# Patient Record
Sex: MALE | Race: WHITE | NOT HISPANIC OR LATINO | Employment: UNEMPLOYED | URBAN - METROPOLITAN AREA
[De-identification: names, ages, dates, MRNs, and addresses within clinical notes are randomized per-mention and may not be internally consistent; named-entity substitution may affect disease eponyms.]

---

## 2021-05-28 ENCOUNTER — RECORDS - HEALTHEAST (OUTPATIENT)
Dept: ADMINISTRATIVE | Facility: CLINIC | Age: 26
End: 2021-05-28

## 2021-05-30 ENCOUNTER — RECORDS - HEALTHEAST (OUTPATIENT)
Dept: ADMINISTRATIVE | Facility: CLINIC | Age: 26
End: 2021-05-30

## 2021-06-03 ENCOUNTER — RECORDS - HEALTHEAST (OUTPATIENT)
Dept: ADMINISTRATIVE | Facility: CLINIC | Age: 26
End: 2021-06-03

## 2022-04-06 ENCOUNTER — APPOINTMENT (OUTPATIENT)
Dept: ULTRASOUND IMAGING | Facility: CLINIC | Age: 27
DRG: 871 | End: 2022-04-06
Attending: STUDENT IN AN ORGANIZED HEALTH CARE EDUCATION/TRAINING PROGRAM
Payer: COMMERCIAL

## 2022-04-06 ENCOUNTER — HOSPITAL ENCOUNTER (INPATIENT)
Facility: CLINIC | Age: 27
LOS: 6 days | Discharge: HOME | DRG: 871 | End: 2022-04-12
Attending: STUDENT IN AN ORGANIZED HEALTH CARE EDUCATION/TRAINING PROGRAM | Admitting: INTERNAL MEDICINE
Payer: COMMERCIAL

## 2022-04-06 DIAGNOSIS — F10.930 ALCOHOL WITHDRAWAL SYNDROME WITHOUT COMPLICATION (H): ICD-10-CM

## 2022-04-06 DIAGNOSIS — J18.9 PNEUMONIA DUE TO INFECTIOUS ORGANISM, UNSPECIFIED LATERALITY, UNSPECIFIED PART OF LUNG: ICD-10-CM

## 2022-04-06 DIAGNOSIS — K06.8 BLEEDING GUMS: ICD-10-CM

## 2022-04-06 DIAGNOSIS — E11.69 DIABETES MELLITUS TYPE 2 IN OBESE: Chronic | ICD-10-CM

## 2022-04-06 DIAGNOSIS — A41.9 SEPSIS, DUE TO UNSPECIFIED ORGANISM, UNSPECIFIED WHETHER ACUTE ORGAN DYSFUNCTION PRESENT (H): ICD-10-CM

## 2022-04-06 DIAGNOSIS — F41.9 ANXIETY: Primary | ICD-10-CM

## 2022-04-06 DIAGNOSIS — D64.9 ANEMIA, UNSPECIFIED TYPE: ICD-10-CM

## 2022-04-06 DIAGNOSIS — F10.921 ALCOHOL INTOXICATION WITH DELIRIUM (H): Chronic | ICD-10-CM

## 2022-04-06 DIAGNOSIS — D68.9 COAGULOPATHY (H): ICD-10-CM

## 2022-04-06 DIAGNOSIS — F10.930 ALCOHOL WITHDRAWAL, UNCOMPLICATED (H): Chronic | ICD-10-CM

## 2022-04-06 DIAGNOSIS — D69.6 THROMBOCYTOPENIA (H): ICD-10-CM

## 2022-04-06 DIAGNOSIS — E66.9 DIABETES MELLITUS TYPE 2 IN OBESE: Chronic | ICD-10-CM

## 2022-04-06 DIAGNOSIS — K70.31 ALCOHOLIC CIRRHOSIS OF LIVER WITH ASCITES (H): ICD-10-CM

## 2022-04-06 LAB
ALBUMIN SERPL-MCNC: 3.7 G/DL (ref 3.5–5)
ALP SERPL-CCNC: 158 U/L (ref 45–120)
ALT SERPL W P-5'-P-CCNC: 131 U/L (ref 0–45)
ANION GAP SERPL CALCULATED.3IONS-SCNC: 21 MMOL/L (ref 5–18)
APTT PPP: 49 SECONDS (ref 22–38)
AST SERPL W P-5'-P-CCNC: 237 U/L (ref 0–40)
BILIRUB DIRECT SERPL-MCNC: 4.1 MG/DL
BILIRUB SERPL-MCNC: 11.7 MG/DL (ref 0–1)
BUN SERPL-MCNC: 5 MG/DL (ref 8–22)
CALCIUM SERPL-MCNC: 9.8 MG/DL (ref 8.5–10.5)
CHLORIDE BLD-SCNC: 86 MMOL/L (ref 98–107)
CO2 SERPL-SCNC: 23 MMOL/L (ref 22–31)
CREAT SERPL-MCNC: 0.67 MG/DL (ref 0.7–1.3)
ETHANOL SERPL-MCNC: 225 MG/DL
GFR SERPL CREATININE-BSD FRML MDRD: >90 ML/MIN/1.73M2
GLUCOSE BLD-MCNC: 374 MG/DL (ref 70–125)
INR PPP: 1.72 (ref 0.85–1.15)
LACTATE SERPL-SCNC: 4.3 MMOL/L (ref 0.7–2)
LIPASE SERPL-CCNC: 45 U/L (ref 0–52)
MAGNESIUM SERPL-MCNC: 1.8 MG/DL (ref 1.8–2.6)
POTASSIUM BLD-SCNC: 4 MMOL/L (ref 3.5–5)
PROT SERPL-MCNC: 10.3 G/DL (ref 6–8)
SARS-COV-2 RNA RESP QL NAA+PROBE: NEGATIVE
SODIUM SERPL-SCNC: 130 MMOL/L (ref 136–145)

## 2022-04-06 PROCEDURE — 96367 TX/PROPH/DG ADDL SEQ IV INF: CPT

## 2022-04-06 PROCEDURE — 250N000011 HC RX IP 250 OP 636: Performed by: STUDENT IN AN ORGANIZED HEALTH CARE EDUCATION/TRAINING PROGRAM

## 2022-04-06 PROCEDURE — 82248 BILIRUBIN DIRECT: CPT | Performed by: STUDENT IN AN ORGANIZED HEALTH CARE EDUCATION/TRAINING PROGRAM

## 2022-04-06 PROCEDURE — 87040 BLOOD CULTURE FOR BACTERIA: CPT | Performed by: STUDENT IN AN ORGANIZED HEALTH CARE EDUCATION/TRAINING PROGRAM

## 2022-04-06 PROCEDURE — 76705 ECHO EXAM OF ABDOMEN: CPT

## 2022-04-06 PROCEDURE — 83880 ASSAY OF NATRIURETIC PEPTIDE: CPT | Performed by: INTERNAL MEDICINE

## 2022-04-06 PROCEDURE — 96365 THER/PROPH/DIAG IV INF INIT: CPT

## 2022-04-06 PROCEDURE — 258N000003 HC RX IP 258 OP 636: Performed by: STUDENT IN AN ORGANIZED HEALTH CARE EDUCATION/TRAINING PROGRAM

## 2022-04-06 PROCEDURE — 99285 EMERGENCY DEPT VISIT HI MDM: CPT | Mod: 25

## 2022-04-06 PROCEDURE — 82077 ASSAY SPEC XCP UR&BREATH IA: CPT | Performed by: STUDENT IN AN ORGANIZED HEALTH CARE EDUCATION/TRAINING PROGRAM

## 2022-04-06 PROCEDURE — 96376 TX/PRO/DX INJ SAME DRUG ADON: CPT

## 2022-04-06 PROCEDURE — C9803 HOPD COVID-19 SPEC COLLECT: HCPCS

## 2022-04-06 PROCEDURE — 36415 COLL VENOUS BLD VENIPUNCTURE: CPT | Performed by: STUDENT IN AN ORGANIZED HEALTH CARE EDUCATION/TRAINING PROGRAM

## 2022-04-06 PROCEDURE — HZ2ZZZZ DETOXIFICATION SERVICES FOR SUBSTANCE ABUSE TREATMENT: ICD-10-PCS | Performed by: INTERNAL MEDICINE

## 2022-04-06 PROCEDURE — 85610 PROTHROMBIN TIME: CPT | Performed by: STUDENT IN AN ORGANIZED HEALTH CARE EDUCATION/TRAINING PROGRAM

## 2022-04-06 PROCEDURE — 85730 THROMBOPLASTIN TIME PARTIAL: CPT | Performed by: STUDENT IN AN ORGANIZED HEALTH CARE EDUCATION/TRAINING PROGRAM

## 2022-04-06 PROCEDURE — 83605 ASSAY OF LACTIC ACID: CPT | Performed by: STUDENT IN AN ORGANIZED HEALTH CARE EDUCATION/TRAINING PROGRAM

## 2022-04-06 PROCEDURE — 96375 TX/PRO/DX INJ NEW DRUG ADDON: CPT

## 2022-04-06 PROCEDURE — 99223 1ST HOSP IP/OBS HIGH 75: CPT | Mod: AI | Performed by: INTERNAL MEDICINE

## 2022-04-06 PROCEDURE — 87635 SARS-COV-2 COVID-19 AMP PRB: CPT | Performed by: STUDENT IN AN ORGANIZED HEALTH CARE EDUCATION/TRAINING PROGRAM

## 2022-04-06 PROCEDURE — 83690 ASSAY OF LIPASE: CPT | Performed by: STUDENT IN AN ORGANIZED HEALTH CARE EDUCATION/TRAINING PROGRAM

## 2022-04-06 PROCEDURE — 96361 HYDRATE IV INFUSION ADD-ON: CPT

## 2022-04-06 PROCEDURE — 85027 COMPLETE CBC AUTOMATED: CPT | Performed by: STUDENT IN AN ORGANIZED HEALTH CARE EDUCATION/TRAINING PROGRAM

## 2022-04-06 PROCEDURE — 210N000002 HC R&B HEART CARE

## 2022-04-06 PROCEDURE — 83735 ASSAY OF MAGNESIUM: CPT | Performed by: STUDENT IN AN ORGANIZED HEALTH CARE EDUCATION/TRAINING PROGRAM

## 2022-04-06 PROCEDURE — 84100 ASSAY OF PHOSPHORUS: CPT | Performed by: INTERNAL MEDICINE

## 2022-04-06 PROCEDURE — 80053 COMPREHEN METABOLIC PANEL: CPT | Performed by: STUDENT IN AN ORGANIZED HEALTH CARE EDUCATION/TRAINING PROGRAM

## 2022-04-06 RX ORDER — DIAZEPAM 10 MG/2ML
5 INJECTION, SOLUTION INTRAMUSCULAR; INTRAVENOUS ONCE
Status: COMPLETED | OUTPATIENT
Start: 2022-04-06 | End: 2022-04-06

## 2022-04-06 RX ORDER — CEFEPIME HYDROCHLORIDE 1 G/1
1 INJECTION, POWDER, FOR SOLUTION INTRAMUSCULAR; INTRAVENOUS ONCE
Status: COMPLETED | OUTPATIENT
Start: 2022-04-06 | End: 2022-04-07

## 2022-04-06 RX ORDER — DIAZEPAM 10 MG/2ML
5 INJECTION, SOLUTION INTRAMUSCULAR; INTRAVENOUS ONCE
Status: COMPLETED | OUTPATIENT
Start: 2022-04-06 | End: 2022-04-07

## 2022-04-06 RX ADMIN — PHYTONADIONE 2 MG: 10 INJECTION, EMULSION INTRAMUSCULAR; INTRAVENOUS; SUBCUTANEOUS at 23:16

## 2022-04-06 RX ADMIN — DIAZEPAM 5 MG: 5 INJECTION, SOLUTION INTRAMUSCULAR; INTRAVENOUS at 23:13

## 2022-04-06 RX ADMIN — SODIUM CHLORIDE 1000 ML: 9 INJECTION, SOLUTION INTRAVENOUS at 23:38

## 2022-04-07 ENCOUNTER — APPOINTMENT (OUTPATIENT)
Dept: CARDIOLOGY | Facility: CLINIC | Age: 27
DRG: 871 | End: 2022-04-07
Attending: INTERNAL MEDICINE
Payer: COMMERCIAL

## 2022-04-07 ENCOUNTER — APPOINTMENT (OUTPATIENT)
Dept: CT IMAGING | Facility: CLINIC | Age: 27
DRG: 871 | End: 2022-04-07
Attending: STUDENT IN AN ORGANIZED HEALTH CARE EDUCATION/TRAINING PROGRAM
Payer: COMMERCIAL

## 2022-04-07 ENCOUNTER — APPOINTMENT (OUTPATIENT)
Dept: ULTRASOUND IMAGING | Facility: CLINIC | Age: 27
DRG: 871 | End: 2022-04-07
Attending: INTERNAL MEDICINE
Payer: COMMERCIAL

## 2022-04-07 ENCOUNTER — APPOINTMENT (OUTPATIENT)
Dept: RADIOLOGY | Facility: CLINIC | Age: 27
DRG: 871 | End: 2022-04-07
Attending: INTERNAL MEDICINE
Payer: COMMERCIAL

## 2022-04-07 LAB
ABO/RH(D): NORMAL
ALBUMIN SERPL-MCNC: 3.1 G/DL (ref 3.5–5)
ALP SERPL-CCNC: 128 U/L (ref 45–120)
ALT SERPL W P-5'-P-CCNC: 110 U/L (ref 0–45)
ANION GAP SERPL CALCULATED.3IONS-SCNC: 14 MMOL/L (ref 5–18)
ANTIBODY SCREEN: NEGATIVE
AST SERPL W P-5'-P-CCNC: 199 U/L (ref 0–40)
BASE EXCESS BLDV CALC-SCNC: 3.8 MMOL/L
BASOPHILS # BLD MANUAL: 0.1 10E3/UL (ref 0–0.2)
BASOPHILS # BLD MANUAL: 0.3 10E3/UL (ref 0–0.2)
BASOPHILS NFR BLD MANUAL: 1 %
BASOPHILS NFR BLD MANUAL: 3 %
BILIRUB SERPL-MCNC: 10.3 MG/DL (ref 0–1)
BNP SERPL-MCNC: 138 PG/ML (ref 0–35)
BNP SERPL-MCNC: 205 PG/ML (ref 0–35)
BUN SERPL-MCNC: 6 MG/DL (ref 8–22)
CALCIUM SERPL-MCNC: 8.9 MG/DL (ref 8.5–10.5)
CHLORIDE BLD-SCNC: 93 MMOL/L (ref 98–107)
CO2 SERPL-SCNC: 25 MMOL/L (ref 22–31)
CREAT SERPL-MCNC: 0.62 MG/DL (ref 0.7–1.3)
EOSINOPHIL # BLD MANUAL: 0 10E3/UL (ref 0–0.7)
EOSINOPHIL # BLD MANUAL: 0.2 10E3/UL (ref 0–0.7)
EOSINOPHIL NFR BLD MANUAL: 0 %
EOSINOPHIL NFR BLD MANUAL: 2 %
ERYTHROCYTE [DISTWIDTH] IN BLOOD BY AUTOMATED COUNT: 16.2 % (ref 10–15)
ERYTHROCYTE [DISTWIDTH] IN BLOOD BY AUTOMATED COUNT: 16.4 % (ref 10–15)
FERRITIN SERPL-MCNC: 423 NG/ML (ref 27–300)
FOLATE SERPL-MCNC: >20 NG/ML
GFR SERPL CREATININE-BSD FRML MDRD: >90 ML/MIN/1.73M2
GLUCOSE BLD-MCNC: 279 MG/DL (ref 70–125)
GLUCOSE BLDC GLUCOMTR-MCNC: 253 MG/DL (ref 70–99)
GLUCOSE BLDC GLUCOMTR-MCNC: 269 MG/DL (ref 70–99)
GLUCOSE BLDC GLUCOMTR-MCNC: 297 MG/DL (ref 70–99)
GLUCOSE BLDC GLUCOMTR-MCNC: 311 MG/DL (ref 70–99)
GLUCOSE BLDC GLUCOMTR-MCNC: 346 MG/DL (ref 70–99)
HAV IGM SERPL QL IA: NEGATIVE
HBA1C MFR BLD: 7.4 %
HBV CORE IGM SERPL QL IA: NEGATIVE
HBV SURFACE AG SERPL QL IA: NONREACTIVE
HCO3 BLDV-SCNC: 27 MMOL/L (ref 24–30)
HCT VFR BLD AUTO: 21.9 % (ref 40–53)
HCT VFR BLD AUTO: 27 % (ref 40–53)
HCV AB SERPL QL IA: NEGATIVE
HGB BLD-MCNC: 7.1 G/DL (ref 13.3–17.7)
HGB BLD-MCNC: 7.4 G/DL (ref 13.3–17.7)
HGB BLD-MCNC: 7.6 G/DL (ref 13.3–17.7)
HGB BLD-MCNC: 8.6 G/DL (ref 13.3–17.7)
INR PPP: 1.83 (ref 0.85–1.15)
INR PPP: 1.86 (ref 0.85–1.15)
IRON SATN MFR SERPL: 46 % (ref 20–50)
IRON SERPL-MCNC: 85 UG/DL (ref 42–175)
LACTATE SERPL-SCNC: 2.1 MMOL/L (ref 0.7–2)
LACTATE SERPL-SCNC: 3.3 MMOL/L (ref 0.7–2)
LVEF ECHO: NORMAL
LYMPHOCYTES # BLD MANUAL: 1.2 10E3/UL (ref 0.8–5.3)
LYMPHOCYTES # BLD MANUAL: 1.3 10E3/UL (ref 0.8–5.3)
LYMPHOCYTES NFR BLD MANUAL: 12 %
LYMPHOCYTES NFR BLD MANUAL: 16 %
MAGNESIUM SERPL-MCNC: 1.7 MG/DL (ref 1.8–2.6)
MCH RBC QN AUTO: 33.7 PG (ref 26.5–33)
MCH RBC QN AUTO: 33.8 PG (ref 26.5–33)
MCHC RBC AUTO-ENTMCNC: 31.9 G/DL (ref 31.5–36.5)
MCHC RBC AUTO-ENTMCNC: 32.4 G/DL (ref 31.5–36.5)
MCV RBC AUTO: 104 FL (ref 78–100)
MCV RBC AUTO: 106 FL (ref 78–100)
MONOCYTES # BLD MANUAL: 0.3 10E3/UL (ref 0–1.3)
MONOCYTES # BLD MANUAL: 0.4 10E3/UL (ref 0–1.3)
MONOCYTES NFR BLD MANUAL: 4 %
MONOCYTES NFR BLD MANUAL: 4 %
NEUTROPHILS # BLD MANUAL: 6.3 10E3/UL (ref 1.6–8.3)
NEUTROPHILS # BLD MANUAL: 7.9 10E3/UL (ref 1.6–8.3)
NEUTROPHILS NFR BLD MANUAL: 77 %
NEUTROPHILS NFR BLD MANUAL: 81 %
OXYHGB MFR BLDV: 89.4 % (ref 70–75)
PCO2 BLDV: 46 MM HG (ref 35–50)
PH BLDV: 7.4 [PH] (ref 7.35–7.45)
PHOSPHATE SERPL-MCNC: 3.7 MG/DL (ref 2.5–4.5)
PHOSPHATE SERPL-MCNC: 3.9 MG/DL (ref 2.5–4.5)
PLAT MORPH BLD: ABNORMAL
PLAT MORPH BLD: NORMAL
PLATELET # BLD AUTO: 111 10E3/UL (ref 150–450)
PLATELET # BLD AUTO: 97 10E3/UL (ref 150–450)
PO2 BLDV: 69 MM HG (ref 25–47)
POTASSIUM BLD-SCNC: 4.1 MMOL/L (ref 3.5–5)
PROCALCITONIN SERPL-MCNC: 0.41 NG/ML (ref 0–0.49)
PROT SERPL-MCNC: 8.5 G/DL (ref 6–8)
RBC # BLD AUTO: 2.1 10E6/UL (ref 4.4–5.9)
RBC # BLD AUTO: 2.55 10E6/UL (ref 4.4–5.9)
RBC MORPH BLD: ABNORMAL
RBC MORPH BLD: NORMAL
SAO2 % BLDV: 93.2 % (ref 70–75)
SODIUM SERPL-SCNC: 132 MMOL/L (ref 136–145)
SPECIMEN EXPIRATION DATE: NORMAL
TIBC SERPL-MCNC: 185 UG/DL (ref 313–563)
TRANSFERRIN SERPL-MCNC: 148 MG/DL (ref 212–360)
TROPONIN I SERPL-MCNC: 0.02 NG/ML (ref 0–0.29)
VIT B12 SERPL-MCNC: >2000 PG/ML (ref 213–816)
WBC # BLD AUTO: 8.2 10E3/UL (ref 4–11)
WBC # BLD AUTO: 9.7 10E3/UL (ref 4–11)

## 2022-04-07 PROCEDURE — 84100 ASSAY OF PHOSPHORUS: CPT | Performed by: INTERNAL MEDICINE

## 2022-04-07 PROCEDURE — 83605 ASSAY OF LACTIC ACID: CPT | Performed by: INTERNAL MEDICINE

## 2022-04-07 PROCEDURE — 210N000002 HC R&B HEART CARE

## 2022-04-07 PROCEDURE — 80074 ACUTE HEPATITIS PANEL: CPT | Performed by: INTERNAL MEDICINE

## 2022-04-07 PROCEDURE — 99233 SBSQ HOSP IP/OBS HIGH 50: CPT | Performed by: INTERNAL MEDICINE

## 2022-04-07 PROCEDURE — 93306 TTE W/DOPPLER COMPLETE: CPT

## 2022-04-07 PROCEDURE — 84145 PROCALCITONIN (PCT): CPT | Performed by: INTERNAL MEDICINE

## 2022-04-07 PROCEDURE — 99223 1ST HOSP IP/OBS HIGH 75: CPT | Performed by: INTERNAL MEDICINE

## 2022-04-07 PROCEDURE — 83880 ASSAY OF NATRIURETIC PEPTIDE: CPT | Performed by: INTERNAL MEDICINE

## 2022-04-07 PROCEDURE — 36415 COLL VENOUS BLD VENIPUNCTURE: CPT | Performed by: INTERNAL MEDICINE

## 2022-04-07 PROCEDURE — 84484 ASSAY OF TROPONIN QUANT: CPT | Performed by: INTERNAL MEDICINE

## 2022-04-07 PROCEDURE — 250N000011 HC RX IP 250 OP 636: Performed by: INTERNAL MEDICINE

## 2022-04-07 PROCEDURE — 82728 ASSAY OF FERRITIN: CPT | Performed by: INTERNAL MEDICINE

## 2022-04-07 PROCEDURE — 99223 1ST HOSP IP/OBS HIGH 75: CPT | Mod: GT | Performed by: INTERNAL MEDICINE

## 2022-04-07 PROCEDURE — 71045 X-RAY EXAM CHEST 1 VIEW: CPT

## 2022-04-07 PROCEDURE — 93306 TTE W/DOPPLER COMPLETE: CPT | Mod: 26 | Performed by: INTERNAL MEDICINE

## 2022-04-07 PROCEDURE — 85018 HEMOGLOBIN: CPT | Performed by: INTERNAL MEDICINE

## 2022-04-07 PROCEDURE — 250N000011 HC RX IP 250 OP 636: Performed by: STUDENT IN AN ORGANIZED HEALTH CARE EDUCATION/TRAINING PROGRAM

## 2022-04-07 PROCEDURE — 36415 COLL VENOUS BLD VENIPUNCTURE: CPT | Performed by: STUDENT IN AN ORGANIZED HEALTH CARE EDUCATION/TRAINING PROGRAM

## 2022-04-07 PROCEDURE — 250N000013 HC RX MED GY IP 250 OP 250 PS 637: Performed by: INTERNAL MEDICINE

## 2022-04-07 PROCEDURE — 83735 ASSAY OF MAGNESIUM: CPT | Performed by: INTERNAL MEDICINE

## 2022-04-07 PROCEDURE — 74177 CT ABD & PELVIS W/CONTRAST: CPT

## 2022-04-07 PROCEDURE — C9113 INJ PANTOPRAZOLE SODIUM, VIA: HCPCS | Performed by: INTERNAL MEDICINE

## 2022-04-07 PROCEDURE — 82607 VITAMIN B-12: CPT | Performed by: INTERNAL MEDICINE

## 2022-04-07 PROCEDURE — 250N000013 HC RX MED GY IP 250 OP 250 PS 637: Performed by: PHYSICIAN ASSISTANT

## 2022-04-07 PROCEDURE — 250N000009 HC RX 250: Performed by: INTERNAL MEDICINE

## 2022-04-07 PROCEDURE — 85610 PROTHROMBIN TIME: CPT | Performed by: STUDENT IN AN ORGANIZED HEALTH CARE EDUCATION/TRAINING PROGRAM

## 2022-04-07 PROCEDURE — 86850 RBC ANTIBODY SCREEN: CPT | Performed by: STUDENT IN AN ORGANIZED HEALTH CARE EDUCATION/TRAINING PROGRAM

## 2022-04-07 PROCEDURE — 83605 ASSAY OF LACTIC ACID: CPT | Performed by: STUDENT IN AN ORGANIZED HEALTH CARE EDUCATION/TRAINING PROGRAM

## 2022-04-07 PROCEDURE — 258N000003 HC RX IP 258 OP 636: Performed by: STUDENT IN AN ORGANIZED HEALTH CARE EDUCATION/TRAINING PROGRAM

## 2022-04-07 PROCEDURE — 250N000012 HC RX MED GY IP 250 OP 636 PS 637: Performed by: INTERNAL MEDICINE

## 2022-04-07 PROCEDURE — 83036 HEMOGLOBIN GLYCOSYLATED A1C: CPT | Performed by: INTERNAL MEDICINE

## 2022-04-07 PROCEDURE — 258N000003 HC RX IP 258 OP 636: Performed by: INTERNAL MEDICINE

## 2022-04-07 PROCEDURE — 83540 ASSAY OF IRON: CPT | Performed by: INTERNAL MEDICINE

## 2022-04-07 PROCEDURE — 82805 BLOOD GASES W/O2 SATURATION: CPT | Performed by: INTERNAL MEDICINE

## 2022-04-07 PROCEDURE — 93970 EXTREMITY STUDY: CPT

## 2022-04-07 PROCEDURE — 80053 COMPREHEN METABOLIC PANEL: CPT | Performed by: INTERNAL MEDICINE

## 2022-04-07 PROCEDURE — 272N000004 HC RX 272: Performed by: EMERGENCY MEDICINE

## 2022-04-07 PROCEDURE — 82746 ASSAY OF FOLIC ACID SERUM: CPT | Performed by: INTERNAL MEDICINE

## 2022-04-07 PROCEDURE — 85027 COMPLETE CBC AUTOMATED: CPT | Performed by: INTERNAL MEDICINE

## 2022-04-07 RX ORDER — LORAZEPAM 2 MG/ML
1-2 INJECTION INTRAMUSCULAR EVERY 30 MIN PRN
Status: DISCONTINUED | OUTPATIENT
Start: 2022-04-07 | End: 2022-04-08

## 2022-04-07 RX ORDER — GABAPENTIN 100 MG/1
100 CAPSULE ORAL EVERY 8 HOURS
Status: DISCONTINUED | OUTPATIENT
Start: 2022-04-09 | End: 2022-04-08

## 2022-04-07 RX ORDER — MEROPENEM 1 G/1
1 INJECTION, POWDER, FOR SOLUTION INTRAVENOUS EVERY 8 HOURS
Status: DISCONTINUED | OUTPATIENT
Start: 2022-04-07 | End: 2022-04-07

## 2022-04-07 RX ORDER — HYDROXYZINE HYDROCHLORIDE 25 MG/1
25 TABLET, FILM COATED ORAL EVERY 8 HOURS PRN
Status: ON HOLD | COMMUNITY
End: 2022-06-20

## 2022-04-07 RX ORDER — GABAPENTIN 100 MG/1
100 CAPSULE ORAL ONCE
Status: COMPLETED | OUTPATIENT
Start: 2022-04-07 | End: 2022-04-07

## 2022-04-07 RX ORDER — LORAZEPAM 2 MG/ML
1 INJECTION INTRAMUSCULAR EVERY 6 HOURS PRN
Status: COMPLETED | OUTPATIENT
Start: 2022-04-07 | End: 2022-04-07

## 2022-04-07 RX ORDER — FLUMAZENIL 0.1 MG/ML
0.2 INJECTION, SOLUTION INTRAVENOUS
Status: DISCONTINUED | OUTPATIENT
Start: 2022-04-07 | End: 2022-04-12 | Stop reason: HOSPADM

## 2022-04-07 RX ORDER — HALOPERIDOL 5 MG/ML
2.5-5 INJECTION INTRAMUSCULAR EVERY 6 HOURS PRN
Status: DISCONTINUED | OUTPATIENT
Start: 2022-04-07 | End: 2022-04-12 | Stop reason: HOSPADM

## 2022-04-07 RX ORDER — HYDROMORPHONE HCL IN WATER/PF 6 MG/30 ML
0.2 PATIENT CONTROLLED ANALGESIA SYRINGE INTRAVENOUS EVERY 4 HOURS PRN
Status: DISCONTINUED | OUTPATIENT
Start: 2022-04-07 | End: 2022-04-08

## 2022-04-07 RX ORDER — ALBUTEROL SULFATE 0.83 MG/ML
2.5 SOLUTION RESPIRATORY (INHALATION) EVERY 6 HOURS PRN
Status: DISCONTINUED | OUTPATIENT
Start: 2022-04-07 | End: 2022-04-12 | Stop reason: HOSPADM

## 2022-04-07 RX ORDER — LORAZEPAM 1 MG/1
1-2 TABLET ORAL EVERY 30 MIN PRN
Status: DISCONTINUED | OUTPATIENT
Start: 2022-04-07 | End: 2022-04-08

## 2022-04-07 RX ORDER — OLANZAPINE 5 MG/1
5-10 TABLET, ORALLY DISINTEGRATING ORAL EVERY 6 HOURS PRN
Status: DISCONTINUED | OUTPATIENT
Start: 2022-04-07 | End: 2022-04-12 | Stop reason: HOSPADM

## 2022-04-07 RX ORDER — LACTULOSE 10 G/15ML
10 SOLUTION ORAL 2 TIMES DAILY
Status: DISCONTINUED | OUTPATIENT
Start: 2022-04-07 | End: 2022-04-08

## 2022-04-07 RX ORDER — CEFTAZIDIME 2 G/1
2 INJECTION, POWDER, FOR SOLUTION INTRAVENOUS EVERY 8 HOURS
Status: DISCONTINUED | OUTPATIENT
Start: 2022-04-07 | End: 2022-04-11

## 2022-04-07 RX ORDER — HYDROXYZINE HYDROCHLORIDE 25 MG/1
25 TABLET, FILM COATED ORAL EVERY 8 HOURS PRN
Status: DISCONTINUED | OUTPATIENT
Start: 2022-04-07 | End: 2022-04-12 | Stop reason: HOSPADM

## 2022-04-07 RX ORDER — NICOTINE POLACRILEX 4 MG
15-30 LOZENGE BUCCAL
Status: DISCONTINUED | OUTPATIENT
Start: 2022-04-07 | End: 2022-04-08

## 2022-04-07 RX ORDER — DEXTROSE MONOHYDRATE 25 G/50ML
25-50 INJECTION, SOLUTION INTRAVENOUS
Status: DISCONTINUED | OUTPATIENT
Start: 2022-04-07 | End: 2022-04-12 | Stop reason: HOSPADM

## 2022-04-07 RX ORDER — FUROSEMIDE 10 MG/ML
20 INJECTION INTRAMUSCULAR; INTRAVENOUS EVERY 8 HOURS
Status: DISCONTINUED | OUTPATIENT
Start: 2022-04-07 | End: 2022-04-10

## 2022-04-07 RX ORDER — LIDOCAINE 40 MG/G
CREAM TOPICAL
Status: DISCONTINUED | OUTPATIENT
Start: 2022-04-07 | End: 2022-04-12 | Stop reason: HOSPADM

## 2022-04-07 RX ORDER — DEXTROSE MONOHYDRATE 25 G/50ML
25-50 INJECTION, SOLUTION INTRAVENOUS
Status: DISCONTINUED | OUTPATIENT
Start: 2022-04-07 | End: 2022-04-08

## 2022-04-07 RX ORDER — FOLIC ACID 1 MG/1
1 TABLET ORAL DAILY
Status: DISCONTINUED | OUTPATIENT
Start: 2022-04-07 | End: 2022-04-12 | Stop reason: HOSPADM

## 2022-04-07 RX ORDER — IOPAMIDOL 755 MG/ML
100 INJECTION, SOLUTION INTRAVASCULAR ONCE
Status: COMPLETED | OUTPATIENT
Start: 2022-04-07 | End: 2022-04-07

## 2022-04-07 RX ORDER — FUROSEMIDE 10 MG/ML
10 INJECTION INTRAMUSCULAR; INTRAVENOUS ONCE
Status: COMPLETED | OUTPATIENT
Start: 2022-04-07 | End: 2022-04-07

## 2022-04-07 RX ORDER — MULTIVITAMIN,THERAPEUTIC
1 TABLET ORAL DAILY
Status: DISCONTINUED | OUTPATIENT
Start: 2022-04-07 | End: 2022-04-12 | Stop reason: HOSPADM

## 2022-04-07 RX ORDER — LORAZEPAM 2 MG/ML
1 INJECTION INTRAMUSCULAR ONCE
Status: COMPLETED | OUTPATIENT
Start: 2022-04-07 | End: 2022-04-07

## 2022-04-07 RX ORDER — GABAPENTIN 300 MG/1
300 CAPSULE ORAL EVERY 8 HOURS
Status: DISCONTINUED | OUTPATIENT
Start: 2022-04-07 | End: 2022-04-08

## 2022-04-07 RX ORDER — CEFEPIME HYDROCHLORIDE 1 G/1
1 INJECTION, POWDER, FOR SOLUTION INTRAMUSCULAR; INTRAVENOUS EVERY 8 HOURS
Status: DISCONTINUED | OUTPATIENT
Start: 2022-04-07 | End: 2022-04-07

## 2022-04-07 RX ORDER — GABAPENTIN 300 MG/1
300 CAPSULE ORAL EVERY 8 HOURS
Status: DISCONTINUED | OUTPATIENT
Start: 2022-04-07 | End: 2022-04-07

## 2022-04-07 RX ORDER — NICOTINE POLACRILEX 4 MG
15-30 LOZENGE BUCCAL
Status: DISCONTINUED | OUTPATIENT
Start: 2022-04-07 | End: 2022-04-12 | Stop reason: HOSPADM

## 2022-04-07 RX ORDER — FLUOXETINE 10 MG/1
10 CAPSULE ORAL AT BEDTIME
Status: ON HOLD | COMMUNITY
End: 2022-04-11

## 2022-04-07 RX ORDER — FLUOXETINE 10 MG/1
10 CAPSULE ORAL AT BEDTIME
Status: DISCONTINUED | OUTPATIENT
Start: 2022-04-07 | End: 2022-04-11

## 2022-04-07 RX ORDER — CLONIDINE HYDROCHLORIDE 0.1 MG/1
0.1 TABLET ORAL EVERY 8 HOURS
Status: DISCONTINUED | OUTPATIENT
Start: 2022-04-07 | End: 2022-04-08

## 2022-04-07 RX ORDER — CLONIDINE HYDROCHLORIDE 0.1 MG/1
0.1 TABLET ORAL EVERY 8 HOURS PRN
Status: DISCONTINUED | OUTPATIENT
Start: 2022-04-07 | End: 2022-04-07

## 2022-04-07 RX ADMIN — Medication 5 MG: at 12:24

## 2022-04-07 RX ADMIN — LORAZEPAM 1 MG: 2 INJECTION INTRAMUSCULAR; INTRAVENOUS at 04:55

## 2022-04-07 RX ADMIN — CEFTAZIDIME 2 G: 2 INJECTION, POWDER, FOR SOLUTION INTRAVENOUS at 15:34

## 2022-04-07 RX ADMIN — LORAZEPAM 2 MG: 1 TABLET ORAL at 22:41

## 2022-04-07 RX ADMIN — FOLIC ACID 1 MG: 1 TABLET ORAL at 08:25

## 2022-04-07 RX ADMIN — Medication 100 MG: at 01:57

## 2022-04-07 RX ADMIN — GABAPENTIN 300 MG: 300 CAPSULE ORAL at 06:50

## 2022-04-07 RX ADMIN — SODIUM CHLORIDE 1000 ML: 9 INJECTION, SOLUTION INTRAVENOUS at 00:25

## 2022-04-07 RX ADMIN — LORAZEPAM 1 MG: 2 INJECTION INTRAMUSCULAR; INTRAVENOUS at 01:58

## 2022-04-07 RX ADMIN — LORAZEPAM 1 MG: 1 TABLET ORAL at 13:52

## 2022-04-07 RX ADMIN — GABAPENTIN 300 MG: 300 CAPSULE ORAL at 22:41

## 2022-04-07 RX ADMIN — FOLIC ACID 1 MG: 1 TABLET ORAL at 01:57

## 2022-04-07 RX ADMIN — LACTULOSE 10 G: 10 SOLUTION ORAL at 19:13

## 2022-04-07 RX ADMIN — FLUOXETINE 10 MG: 10 CAPSULE ORAL at 22:41

## 2022-04-07 RX ADMIN — IOPAMIDOL 80 ML: 755 INJECTION, SOLUTION INTRAVENOUS at 00:23

## 2022-04-07 RX ADMIN — INSULIN GLARGINE 10 UNITS: 100 INJECTION, SOLUTION SUBCUTANEOUS at 09:20

## 2022-04-07 RX ADMIN — LORAZEPAM 2 MG: 1 TABLET ORAL at 19:12

## 2022-04-07 RX ADMIN — FUROSEMIDE 20 MG: 10 INJECTION, SOLUTION INTRAVENOUS at 15:06

## 2022-04-07 RX ADMIN — FUROSEMIDE 10 MG: 10 INJECTION, SOLUTION INTRAVENOUS at 06:47

## 2022-04-07 RX ADMIN — GABAPENTIN 300 MG: 300 CAPSULE ORAL at 15:06

## 2022-04-07 RX ADMIN — LACTULOSE 10 G: 10 SOLUTION ORAL at 11:02

## 2022-04-07 RX ADMIN — THERA TABS 1 TABLET: TAB at 08:25

## 2022-04-07 RX ADMIN — CLONIDINE HYDROCHLORIDE 0.1 MG: 0.1 TABLET ORAL at 06:50

## 2022-04-07 RX ADMIN — CLONIDINE HYDROCHLORIDE 0.1 MG: 0.1 TABLET ORAL at 15:32

## 2022-04-07 RX ADMIN — CEFEPIME HYDROCHLORIDE 1 G: 1 INJECTION, POWDER, FOR SOLUTION INTRAMUSCULAR; INTRAVENOUS at 11:01

## 2022-04-07 RX ADMIN — CLONIDINE HYDROCHLORIDE 0.1 MG: 0.1 TABLET ORAL at 22:42

## 2022-04-07 RX ADMIN — THERA TABS 1 TABLET: TAB at 01:58

## 2022-04-07 RX ADMIN — Medication: at 01:46

## 2022-04-07 RX ADMIN — ALBUTEROL SULFATE 2.5 MG: 2.5 SOLUTION RESPIRATORY (INHALATION) at 04:56

## 2022-04-07 RX ADMIN — PANTOPRAZOLE SODIUM 40 MG: 40 INJECTION, POWDER, FOR SOLUTION INTRAVENOUS at 08:25

## 2022-04-07 RX ADMIN — Medication 100 MG: at 08:25

## 2022-04-07 RX ADMIN — CEFEPIME HYDROCHLORIDE 1 G: 1 INJECTION, POWDER, FOR SOLUTION INTRAMUSCULAR; INTRAVENOUS at 00:42

## 2022-04-07 RX ADMIN — MAGNESIUM SULFATE HEPTAHYDRATE 1 G: 500 INJECTION, SOLUTION INTRAMUSCULAR; INTRAVENOUS at 09:20

## 2022-04-07 RX ADMIN — DIAZEPAM 5 MG: 5 INJECTION, SOLUTION INTRAMUSCULAR; INTRAVENOUS at 00:58

## 2022-04-07 RX ADMIN — GABAPENTIN 100 MG: 100 CAPSULE ORAL at 01:57

## 2022-04-07 RX ADMIN — LORAZEPAM 1 MG: 1 TABLET ORAL at 12:47

## 2022-04-07 ASSESSMENT — ACTIVITIES OF DAILY LIVING (ADL)
ADLS_ACUITY_SCORE: 6
CONCENTRATING,_REMEMBERING_OR_MAKING_DECISIONS_DIFFICULTY: NO
ADLS_ACUITY_SCORE: 11
FALL_HISTORY_WITHIN_LAST_SIX_MONTHS: NO
DIFFICULTY_COMMUNICATING: NO
ADLS_ACUITY_SCORE: 9
ADLS_ACUITY_SCORE: 6
ADLS_ACUITY_SCORE: 9
DRESSING/BATHING_DIFFICULTY: NO
ADLS_ACUITY_SCORE: 11
ADLS_ACUITY_SCORE: 11
ADLS_ACUITY_SCORE: 9
ADLS_ACUITY_SCORE: 9
HEARING_DIFFICULTY_OR_DEAF: NO
ADLS_ACUITY_SCORE: 9
ADLS_ACUITY_SCORE: 9
ADLS_ACUITY_SCORE: 11
ADLS_ACUITY_SCORE: 9
WALKING_OR_CLIMBING_STAIRS_DIFFICULTY: NO
ADLS_ACUITY_SCORE: 9
CHANGE_IN_FUNCTIONAL_STATUS_SINCE_ONSET_OF_CURRENT_ILLNESS/INJURY: YES
DIFFICULTY_EATING/SWALLOWING: NO
ADLS_ACUITY_SCORE: 9
WEAR_GLASSES_OR_BLIND: YES
ADLS_ACUITY_SCORE: 6
ADLS_ACUITY_SCORE: 6
ADLS_ACUITY_SCORE: 9
DOING_ERRANDS_INDEPENDENTLY_DIFFICULTY: NO
TOILETING_ISSUES: NO
ADLS_ACUITY_SCORE: 9
ADLS_ACUITY_SCORE: 9
ADLS_ACUITY_SCORE: 6

## 2022-04-07 NOTE — ED NOTES
Ipad set up in room for patient and mother to speak with addiction medicine provider. Patient wakes to voice and light stimulation.

## 2022-04-07 NOTE — CONSULTS
GI CONSULT NOTE      Name: Dillon Salter    Medical Record #: [id number]    YOB: 1995    Date: 4/7/2022    CC: We were consulted by Tami Posada MD to see Dillon Salter in regards to dropping hemoglobin and concern for possible GI bleed.    HPI: This is a 26 year old male with a history of Asperger's, T2DM, HTN, depression and alcoholism.  He resides with his mother, who brought him in and helps with much of the history.  Dillon had a small scar in his abdomen and had some blood oozing from the site; this has now stopped.  He had been into his primary provider recently, was jaundiced and discussion was had about the need to stop drinking.  He drinks on a daily basis and has been doing so for years.  He had treatment in [place] in 2018.  Hemoglobin 8.6 on admission; received total of 2 L fluid boluses, hemoglobin now 7.1, , platelets 97, WBC 8.2.  He has not had any red or black stool and has not had any emesis.  Urea nitrogen normal at 6.    Abdominal ultrasound showed cirrhotic appearing liver, distended gallbladder with wall thickening, sludge, no bile duct dilation.  CT showed cirrhosis with splenomegaly, small volume ascites, multiple varices in the upper abdomen.  LFTs notable for , , alk phos 158, total bilirubin 11.7.  INR 1.72    Mother reports that patient is often up at night and napping during the day.  The other day she asked him if it was 2 AM or 2 PM.    Past medical history  As above    Family history  Alcoholism in several relatives.  Unaware of any cirrhosis in those relatives.    Social history  Social History     Tobacco Use     Smoking status: Not on file     Smokeless tobacco: Not on file   Substance Use Topics     Alcohol use: Not on file     Drug use: Not on file       Medications:   Current Outpatient Medications   Medication Sig Dispense Refill     FLUoxetine (PROZAC) 10 MG capsule Take 10 mg by mouth At Bedtime       hydrOXYzine (ATARAX) 25 MG  "tablet Take 25 mg by mouth every 8 hours as needed for anxiety           Allergies:    Allergies   Allergen Reactions     Latex Swelling        REVIEW OF SYSTEMS (ROS): Review of systems is as per HPI.  Remainder of complete review of systems is negative.      PHYSICAL EXAMINATION:      BP (!) 146/73 (BP Location: Right arm, Patient Position: Semi-Medrano's, Cuff Size: Child)   Pulse 106   Temp 98.9  F (37.2  C) (Oral)   Resp 22   Ht 1.676 m (5' 6\")   Wt 67.1 kg (148 lb)   SpO2 94%   BMI 23.89 kg/m    GEN: Well developed, well nourished 26 year old male in no acute distress.  HEENT: sclera anicteric, moist mucous membranes, neck soft and supple.  LYMPH: No cervical lymphadenopathy  PULM: lungs clear to auscultation bilaterally.  CARDIO: Regular rate and rhythm  GI: Non-distended.  Bowel sounds positive.  Soft.  Non-tender to palpation.  No guarding.  EXT: warm, no lower extremity edema  NEURO: Alert and oriented.  Speech fluid.    PSYCH: Mental status appropriate, mood and affect normal.      LABS and IMAGING  Recent Labs   Lab 04/07/22 0624 04/06/22 2307   WBC 8.2 9.7   RBC 2.10* 2.55*   HGB 7.1* 8.6*   HCT 21.9* 27.0*   * 106*   MCH 33.8* 33.7*   MCHC 32.4 31.9   RDW 16.4* 16.2*   PLT 97* 111*      Recent Labs   Lab 04/07/22 0624 04/06/22  2307   * 130*   CO2 25 23   BUN 6* 5*     Recent Labs   Lab 04/07/22 0624 04/06/22  2307   ALKPHOS 128* 158*   * 237*   * 131*     Lab Results   Component Value Date    INR 1.83 (H) 04/07/2022    INR 1.72 (H) 04/06/2022       US Lower Extremity Venous Duplex Bilateral    Result Date: 4/7/2022  EXAM: ULTRASOUND VENOUS BILATERAL LOWER EXTREMITIES WITH DOPPLER LOCATION: [facility] DATE/TIME: 4/7/2022 5:07 AM INDICATION: Tachycardia, leg swelling and shortness of breath. COMPARISON: None. TECHNIQUE: Venous Duplex ultrasound of bilateral lower extremities with and without compression, augmentation and duplex. Color flow " and spectral Doppler with waveform analysis performed. FINDINGS: Exam includes the common femoral, femoral, popliteal veins as well as segmentally visualized deep calf veins and greater saphenous vein. RIGHT: Normal compressibility of the common femoral, femoral, popliteal, posterior tibial, peroneal and great saphenous veins. Unremarkable Doppler waveform evaluation of the common femoral, femoral and popliteal veins. LEFT: Normal compressibility of the common femoral, femoral, popliteal, posterior tibial, peroneal and great saphenous veins. Unremarkable Doppler waveform evaluation of the common femoral, femoral and popliteal veins.     IMPRESSION: No evidence of thrombus in the major veins of bilateral lower extremities.         Abdomen US, limited (RUQ only)    Result Date: 4/7/2022  EXAM: US ABDOMEN LIMITED LOCATION: [facility] DATE/TIME: 4/6/2022 11:50 PM INDICATION: Elevated bili, looks like ascites, cirrhosis. COMPARISON: None. TECHNIQUE: Limited abdominal ultrasound. FINDINGS: GALLBLADDER: Gallbladder is distended with wall thickening and tumefactive sludge. BILE DUCTS: No biliary dilatation. The common duct measures 6 mm. LIVER: Hepatomegaly with coarsened hepatic echotexture. RIGHT KIDNEY: No hydronephrosis. PANCREAS: The visualized portions are normal. No significant ascites.     IMPRESSION: 1.  Hepatomegaly with coarsening of hepatic echotexture compatible with cirrhosis. 2.  Gallbladder distention with wall thickening. Wall thickening can be seen with hepatic parenchymal disease. Tumefactive sludge. No bile duct dilatation.     XR Chest Port 1 View    Result Date: 4/7/2022  EXAM: XR CHEST PORT 1 VIEW LOCATION: [facility] DATE/TIME: 4/7/2022 1:19 AM INDICATION: evaluate lung nodules, abnormal CT COMPARISON: 04/27/2013. CT abdomen pelvis from 04/07/2022.     IMPRESSION: Heart size within normal limits. Bilateral central and basilar airspace infiltrates  with mild basilar interstitial prominence. Findings likely reflect a degree of interstitial edema, likely with superimposed pneumonia or pneumonitis. Recommend follow-up chest radiographs to ensure resolution. No pneumothorax.    CT Abdomen Pelvis w Contrast    Result Date: 4/7/2022  EXAM: CT ABDOMEN PELVIS W CONTRAST LOCATION: [facility] DATE/TIME: 4/7/2022 12:37 AM INDICATION: Abdominal distension. COMPARISON: None. TECHNIQUE: CT scan of the abdomen and pelvis was performed following injection of IV contrast. Multiplanar reformats were obtained. Dose reduction techniques were used. CONTRAST: [id number] 80 ml. FINDINGS: LOWER CHEST: Multifocal rounded solid and groundglass nodular infiltrates with superimposed interstitial infiltrates in the visualized lungs. HEPATOBILIARY: Hepatomegaly. Cirrhotic appearance to the liver with trace perihepatic ascites. Gallbladder is distended with wall thickening and sludge. Upper abdominal varices compatible with portal hypertension. PANCREAS: Normal. SPLEEN: Splenomegaly. Splenorenal shunt compatible with portal hypertension. ADRENAL GLANDS: Normal. KIDNEYS/BLADDER: Normal. BOWEL: Normal. LYMPH NODES: Normal. VASCULATURE: Unremarkable. PELVIC ORGANS: Calcification vas deferens compatible with diabetes. Anasarca. MUSCULOSKELETAL: Normal.     IMPRESSION: 1.  Multifocal regions of solid, and groundglass nodularity in the visualized mid and lower lungs with superimposed background of mild interstitial infiltrates. 2.  Cirrhosis with splenomegaly and small volume ascites. Multiple varices compatible with portal hypertension. 3.  Gallbladder is distended with wall thickening which can be seen with parenchymal disease. Sludge within the gallbladder lumen. 4.  Anasarca. 5.  Calcification vas deferens compatible with diabetes.    ASSESSMENT  1.  Anemia.  No evidence of overt GI bleeding.  Suspect this is likely bone marrow suppression from alcoholism.  Drop in  hemoglobin after admit likely related to hemodilution after receiving total of 2 L fluid bolus.  Patient will need EGD, as CT scan does show varices in upper abdomen.  If he remains without evidence of upper GI bleed, anticipate this will be done as an outpatient after acute issues stabilize more.  Macrocytosis - likely related to alcoholism.  Check B12 and folic acid.  2.  Cirrhosis, alcoholic.  Newly diagnosed.  MELD-Na 25.4 on admission.  A.  Encephalopathy -has had change in sleep-wake cycle and confusion prior to admission.  Begin lactulose.  B.  Coagulopathy -INR 1.72 and admit  C.  Ascites-small volume per imaging.  Exam not concerning for spontaneous bacterial peritonitis  D.  Thrombocytopenia  E.  Hyperbilirubinemia  Will need outpatient follow-up with [place] [facility] clinic.  We will check additional serologies to assess for other underlying causes of liver disease.  3.  Alcoholic hepatitis.  , .  4.  Elevated lactic acid -improving.  4.  Asperger's.  Resides with his mother.  5.  Depression - triggers his alcohol use.  6.  Alcoholism.  EtOH level 225 on admission.  Monitoring for withdrawal.    Patient Active Problem List   Diagnosis     Bleeding gums     Alcoholic cirrhosis of liver with ascites (H)     Anemia, unspecified type     PLAN  1.  Watch LFTs, INR, MELD.  2.  Monitor hemoglobin, watch stools; remain alert for any evidence of active GI bleeding.  If so, then EGD.  If no evidence of active GI bleeding while admitted, then likely outpatient EGD for further assessment of varices and possible intervention.  3.  Check additional liver serologies for other underlying causes of liver disease.  4.  Complete alcohol cessation.  Discussed with patient and his mother.  5.  Begin lactulose.  Titrate to 3 loose stools per day.  Discussed with patient and his mother.  6.  Check folic acid and vitamin B-12.  7.  High-protein, low sodium diet.  8.  Outpatient follow-up in GI pathology  clinic.    A total of 45 minutes was spent on patient's care today.  This included chart review, discussion with patient and his mother, formulating assessment and plan, entering orders, discussion with staff, discussion with Dr. Barbosa.  Thank you for asking to consult on this patient.    Jovani Sanderson PA-C   4/7/2022 9:37 AM  [facility]  [phone]       _______________________________________________________________  [facility] Attending  Patient is seen anddiscussed with the PA/CNP, see note above.  Hx of alcoholism, came in for detox.  Last chem dep 2018.  T. Bili 10, INR 1.8, Cr 0.6, Na 132. MELD 25.  Exam: Abdomen soft, with hepatomegaly.   Labs and imaging reviewed.  Assessment/Plan:  ETOH cirrhosis with ETOH hepatitis - moderate.  MELD 25.  First admission for liver disease.  Needs ETOH abstinence and f/u in [facility] liver clinic.       Anderson Barbosa MD  [facility]  Office: [phone]  Cell:[phone]

## 2022-04-07 NOTE — CONSULTS
DIABETES CARE  Situation:  Consulted by Provider for Diabetes Education  26 year old male with type 2  Diabetes. Patient was admitted for alcohol abuse with ascites and liver cirrhosis.  .     Background:  Related Co-morbidities include: HTN, dyslipidemia,   PCP: Gary Rodrigues MD  Social: Lives with mom and dad and grandfather at home    Diabetes History:   History since 2012 or so of Type 2 with orals and insulin.       Meds for BG Management PTA:  None, had stopped taking        Current Inpatient Meds for BG Management:  Lantus 10 units once daily in am  Novolog medium correction scale 1 drops 50 mg/dl      Labs:  Hemoglobin A1C: 7.4              Hgb: 7.1    SCr: 0.62    GFR: >90   Blood Glucose POC:   Results for EYAL ROONEY (MRN [id number]) as of 4/7/2022 14:16   Ref. Range 4/6/2022 23:07 4/7/2022 01:38 4/7/2022 06:24 4/7/2022 07:49 4/7/2022 12:32   Glucose Latest Ref Range: 70 - 125 mg/dL 374 (H)  279 (H)     GLUCOSE BY METER POCT Latest Ref Range: 70 - 99 mg/dL  297 (H)  253 (H) 269 (H)       Diet Order:  *60 gram CHO with 2 gm NA diet  Intake: Not indicated   Weight: 67.1kg    BMI: 23.89    DM EDUCATION/COUNSELING:  Barriers to Learning and/or DM Self-Management: Medication but pretty alert and mom helped with confirming converstation  Previous DM Education: Yes  Current Education and/or visit with Patient and/or caregiver  Educated/reviewed diabetes basics: pathophysiology, hyperglycemia, long term complications, treatment.  Explained normal/goals of blood sugar control,A1C, when to call provider.    Specific medications were explained, including how they each acted on blood sugar, their timing and differences in dosing.   Discussed that for now, insulin is probably the best choice to spare kidneys and liver.  However he will not need as much as he was taking before the weight loss.      After he goes through treatment he can follow with provider to discuss seeing outpatient educator for  "possibly start of GLP1.  May be in addition to Lantus or replace it depending on his readings.    Discussed that insulin for now is the best plan as he goes through his program.    He would like to do better with management of diabetes and overall health.  Gave him new Accuchek meter as his meter is old and not sure if it works. Needs new supplies for it.   Recommendations:  1. Lantus and figure out dose during hospitalization if possible.   2. Meal insulin if needed at a set dose would probably be best.  Only take meal insulin if eating or after eating since appetite may not be reliable for awhile.  3. Take readings 2-4 times a day depending on discharge plan.      Refer to \"Guidelines for Insulin Initiation and Care in Hospitalized Adults\"  link in Diabetes Management Order set for dosing guidelines    Hospital BG goals for blood glucose levels are < 180 for improved health outcomes.    Discharge Needs  Lantus solostar or Levemir Flex pens ( insurance coverage) pack of 5 pens  Pen needles 32 gauge x 4mm   Novolog flex pens if meal insulin is needed, pack of 5 pens  1. Accuchek Guide strips, 2 boxes of 50  2. SoftClix lancets, 1 box  insulin requiring  Diagnosis code: E11.65  To test BG twice daily if no meal insulin and test before all meals in meal insulin.     .     Thank you,   Rosalva Mccarty RN, [facility]  Pager: [phone]             "

## 2022-04-07 NOTE — CONSULTS
Addiction Medicine Inpatient Consultation      Dillon Salter,  1995, MRN [id number]    [facility]  Bleeding gums [K06.8]    PCP: Gary Rodrigues, [phone]   Code status:  Full Code       Extended Emergency Contact Information  Primary Emergency Contact: Leticia Salter  Address: [address] [place] [id number] [facility]  Home Phone: [phone]  Relation: Mother  Secondary Emergency Contact: Wes Salter   [facility]  Home Phone: [phone]  Relation: Father       Date of Admission: 22  Date of Consult: 2022    Tele-Visit Details    Type of service:  Video Visit  Time Service Began (time 1st connected with pt): 10 05  Time Service Ended (time completely finished with pt): 1032  Originating Location (pt. Location): Patient room [facility]  Distant Location (provider location): [facility] Offices  Reason for Televisit: COVID 19  Mode of Communication:  Video Conference via [employer]  Physician has received verbal consent for a video visit from the patient? Yes      Reason for Consultation: Alcohol problems       ASSESSMENT and RECOMMENDATIONS:   1.  Liver cirrhosis, likely secondary to alcohol use -jaundice first noted about 2 months ago but patient has continued to drink.  T of liver shows heterogeneous nodular pattern in the liver with portal hypertension and splenomegaly.  NA-Meld score 26.  Unclear whether there may be some reversibility to disease but given his relatively young age and recent worsening function he may to recover some liver function with alcohol abstinence.  Has coagulopathy and hemoglobin has fallen since yesterday which may reflect fluid shifts for bleeding.    2.  Alcohol use disorder, severe -patient reports related to quit and will to CD treatment, but wants to do outpatien t due to being uncomfortable away from home and family.    Given his autism spectrum disorder, this would be reasonable.   IOP would be  consideration.   Will need Rule 25 assessment as soon as possible, so he could start a program at the time he discharges.    He is also willing to start pharmacotherapy for alcohol use disorder and I recommended acamprosate, but would wait to start until his medical status is more stable.         Recommendations:  1.  Agree with lorazepam for symptomatic alcohol withdrawal.     2.  Patient already somewhat groggy, so would not give any higher doses of gabapentin.    3.  Given concern for bleeding and need to assess BP, would not give scheduled Clonidine.  I changed order to give prn only for elevated BP.   4.  I discontinued hydromorphone - patient is not having any pain and want to avoid other addictive substances.     5.  Carefull with IV fluids as patient likely has tendency to 3rd space fluid due to portal hypertension.  Follow for worsening abdominal distension.    6.   Schedule Rule 25 assessment   7.  Will start acamprosate once medical problems have stabilized.       Lyssa Lutz MD  Addiction Medicine Service  [facility]   Page me (click here for Rosalva Lutz)                    Admission Status:  Voluntary    Code Status:  Full Code    HPI:    Dillon Salter is a 26 year old old male with hx of autism, DM2, alcohol abuse and cirrhosis of liver with ascites.   He came to the [facility] ER on 4/6/22 due to a small wound on abdomen that would not stop bleeding.   He has been admitted but is boarding in the ED.       The patient reports known liver cirrhosis In ED, alcohol level was 225.   Hemoglobin was 8.6 and INR was 1.72.  Total Bili was 11.7,  and .  Total bilirubin was 8.6 on 3/11/2022 and 1.9 on 2/2/2021.    On presentation, he was tachycardic and hypertensive.   He has received 10 mg diazepam IV and 1 mg lorazepam IV twice for withdrawal and most recent CIWA was 1.  He was started on antibiotics for possible sepsis.   Has interstitial infiltrates and groundglass nodularity in  mid and low lung fields.        ETOH:  Type and method of use: mostly beer but occasional hard liquor  First use:  Age 19  Last use: am 4/6/22  Amount/frequency:  10-15 beers per day  Previous pharmacotherapy: No  Hx of complicated withdrawal: No    Denies the use of other addictive substances or misuse of prescription medications    Tobacco: Occasional cigar    Treatment Hx:    Inpatient treatment in 2018, with only a few months of sobriety    Past Medical History:    Autism spectrum disorder  Type 2 diabetes  History of obesity      Psychiatric History:  Depression    Family History:   Substance Use History: Markedly positive family history for alcohol    Social History:  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  Lives with mother, father and grandfather in home in [facility] Meds:    FLUoxetine (PROZAC) 10 MG capsule Take 10 mg by mouth At Bedtime 4/5/2022     hydrOXYzine (ATARAX) 25 MG tablet Take 25 mg by mouth every 8 hours as needed for anxiety  Past Month at Unknown time     Mother reports that patient is inconsistent in taking his medications.    Allergies:  Allergies   Allergen Reactions     Latex Swelling       [place] Prescription Monitoring Program:  No concerning prescriptions for controlled substances in [place] within the last year.      Review of Systems:    Constitutional               100 pound weight loss in the last year due to poor appetite  Vision and Hearing:     Within normal limits   Respiratory:              No cough or shortness or breath   Cardiovascular   No chest pain.   Gastrointestinal    No nausea, vomiting, diarrhea, or melena  Urologic   Denies dysuria or change in frequency.  Neurologic   No hx of seizure or focal weakness.   Having tremors  Psychiatric   Endorses symptoms of depression but denies suicidal ideation, plan or intent.  Denies hallucinations  Rheumatologic   No arthralgias or joint swelling  Hematologic   easy bruising x  "past year  Dermatalogic   Mother has noticed jaundice for about 2 months.  Currently some diaphoresis          Physical Exam:  BP (!) 146/73 (BP Location: Right arm, Patient Position: Semi-Medrano's, Cuff Size: Child)   Pulse 106   Temp 98.9  F (37.2  C) (Oral)   Resp 22   Ht 1.676 m (5' 6\")   Wt 67.1 kg (148 lb)   SpO2 94%   BMI 23.89 kg/m      Latest CIWA was 1    Physical Exam by hospitalist reviewed.   Significant findings: Basilar crackles.  Abdomen soft and nontender.  Bilateral lower extremity edema    General appearance   Appears ill  Dermatologic             jaundiced   HEENT   Scleral icterus.     Neurologic   Oriented to person, place, time and situation   2+ tremor   Psychiatric Mental Status Examination     Cooperative     Mood: Depressed                Affect:   Blunted                Thought content:  No SI, HI or hallucinations reported or observed                Thought processes:  Linear,                 Speech:   Monotone                Motor:  Normal                Insight/judgement:  fair/ fair    Pertinent Labs, Radiology, and EK22 labs  WNC 8.2, Hgb 7.1, Plat 97K  Na 132, K 4.1, Cr 0.62  T. Bili 10.3, , , INR 1.83    Viral hepatitis panel negative    Abdomenal US   22  IMPRESSION:  1. Hepatomegaly with coarsening of hepatic echotexture compatible with cirrhosis.    2. Gallbladder distention with wall thickening. Wall thickening can be seen with hepatic parenchymal disease. Tumefactive sludge. No bile duct dilatation.    Abdomenal CT 22  IMPRESSION:   1. Multifocal regions of solid, and groundglass nodularity in the visualized mid and lower lungs with superimposed background of mild interstitial infiltrates.    2. Cirrhosis with splenomegaly and small volume ascites. Multiple varices compatible with portal hypertension.    3. Gallbladder is distended with wall thickening which can be seen with parenchymal disease. Sludge within the gallbladder lumen.    4. " Anasarca.    5. Calcification vas deferens compatible with diabetes.

## 2022-04-07 NOTE — DISCHARGE INSTRUCTIONS
DIABETES REMINDERS:  1) Check your blood sugar 2x times per day.  If on meal insulin before all meals. Goals in #2  Always bring your blood sugar log and meter to your diabetes-related appointments.  2) Your blood sugar goals:  80-130mg/dL before eating  and  mg/dL 2 hours after eating (or per your doctor).  3) Always be prepared to treat a low blood sugar should it happen. Keep a sugar-containing beverage or food nearby.  4)Follow insulin regimen on discharge orders, if ordered,  until able to see provider where doses may be adjusted based on blood sugar patterns.  5) When to call your clinic:     Blood sugar over 400 mg/dL.     If you have 2 to 3 low blood sugars (under 70mg/dL) in a row,     Low reading the same time of day several days in a row,    Blood sugars elevated and you can not get them down with your usual diabetes regimen,    You are ill and can't keep blood sugars controlled.   6) When to call 911:  If your blood glucose does not get better with treatment, or if you/someone else is unable to give you treatment.  7) Talk to your primary care doctor about an appointment with a Certified Diabetes Educator to assist you with your BG management

## 2022-04-07 NOTE — PLAN OF CARE
Goal Outcome Evaluation:    Pt is oriented x 4.  Sedated at times but did have an alert period this afternoon.  Tolerating PO intake.  No swallowing issues noted.  IV saline locked.  Mom at the bedside, involved and supportive.  Call light and all belongings within reach.  On CIWA, medicated x 2 with results noted.  Scheduled Gabapentin and Clonidine given.  BP stable.  HR tachy and 02 per NC at 4 L.  SOB with exertion.  Urine output close to 1000cc this shift.  Orange/oxana in color.  IV lasix given per orders.  ID following and IV ABX administered.  Afebrile.  Dressing to abdomen CDI.  Gums slightly bloody.  Poor oral hygiene.  Hgb recheck 7.6.  No active bleeding noted.  No bowel movements this shift.  Lactulose given.  Pt to be transferred up to .  Report given.

## 2022-04-07 NOTE — ED TRIAGE NOTES
"Patient presents to ED for evaluation of alcohol abuse. Reports drinking \"a lot\" of drinks everyday x years. Reports usually only drinks beers. Patient has been having lower extremity swelling for the past few days, gums bleeding a couple of months and jaundice for a few months. Patient has a wound on his abdomen that wont stop bleeding x today. Last drink today pta   "

## 2022-04-07 NOTE — CONSULTS
[facility]    Infectious Disease Consultation     Date of Admission:  4/6/2022  Date of Consult (When I saw the patient): 04/07/22    Assessment & Plan   Dillon Salter is a 26 year old male who was admitted on 4/6/2022.     Impression:  1. Alcohol liver disease, intoxication,   2. Alcoholism. ALCOHOL level 225 mg/dl  3. Type 2 diabetes  4. Hypertension  5. Cirrhosis: Jaundice, portal hypertension, anasarca, splenomegaly, coagulopathy, thrombocytopenia  6. Bleeding from abdominal wall, pressure dressing in place  7. Gall bladder distention, thickening  8. ID consulted due to concern for infection/sepsis in patient with alcohol intoxication.  Patient has abdominal wall bleeding, lactic acid elevation, oral bleeding, cirrhosis, alcohol intoxication.     Recommendation:    1. Follow blood cultures  2. Empiric ceftazidime  3. De-escalate/discontinue antibiotics in 72 hours depending on culture results  4. GI following  5. Patient seen in ED.  Discussed with patient and patient's nurse  6. Updated patient's mother at bedside  7. Needs chemical dependency treatment  8. Thank you for consulting infectious disease.    Roxy Estrella MD  [facility]  [phone]      Reason for Consult   Reason for consult: I was asked to evaluate this patient for possible infection    Primary Care Physician   Gary Rodrigues    Chief Complaint     Wound check, bleeding from abdominal wall, alcohol intoxication  History is obtained from the patient and medical records    History of Present Illness   Dillon Salter is a 26 year old male who presents with bleeding from abdominal wall, he thought he popped a pimple however Bleeding and would not stop.  Patient has been drinking large quantities of alcohol, he felt he nicked something on his abdomen and sustained a wound.  As the bleeding would not stop he presented to the ED.  Patient had alcohol on 4/6/2022 prior to arrival.  Patient  has had jaundice for months, and currently tremulous.  Denies fever cough shortness of breath chest pain.  Denies abdominal pain  No known sick contacts  Patient is willing to go for treatment for alcohol abuse    Past Medical History   I have reviewed this patient's medical history and updated it with pertinent information if needed.     Patient Active Problem List   Diagnosis     Bleeding gums     Alcoholic cirrhosis of liver with ascites (H)     Anemia, unspecified type     Past Medical History      Alcohol abuse, in remission 04/16/2018   Encounter for long-term (current) use of insulin 04/19/2013   Morbid obesity 12/17/2012   Diabetes mellitus type 2 in obese 02/07/2012   Disorder of forearm joint        Autism (C)       Diabetes mellitus ([facility]) 2/2012     Ear infection       Tonsillitis       Alcohol abuse, in remission              Past Surgical History   I have reviewed this patient's surgical history and updated it with pertinent information if needed.    Prior to Admission Medications   Prior to Admission Medications   Prescriptions Last Dose Informant Patient Reported? Taking?   FLUoxetine (PROZAC) 10 MG capsule 4/5/2022  Yes Yes   Sig: Take 10 mg by mouth At Bedtime   hydrOXYzine (ATARAX) 25 MG tablet Past Month at Unknown time  Yes Yes   Sig: Take 25 mg by mouth every 8 hours as needed for anxiety       Facility-Administered Medications: None     Allergies   Allergies   Allergen Reactions     Latex Swelling       Immunization History     There is no immunization history on file for this patient.    Social History   I have reviewed this patient's social history and updated it with pertinent information if needed. Dillon MANISH Salter     Currently between jobs  Lives with parents  Drinks daily, per patient large quantity and wanting treatment    Family History   I have reviewed this patient's family history and updated it with pertinent information if needed.   No history of infections or any other chronic  conditions mentioned by patient or patient's mother in the family  Family History       Hypertension Birth Mother        Alcohol/Drug Abuse Maternal Grandfather        Depression Maternal Grandmother        Hypertension Maternal Grandmother               Review of Systems   The 10 point Review of Systems is negative other than noted in the HPI or here.     Physical Exam   Temp: 98.9  F (37.2  C) Temp src: Oral BP: (!) 146/73 Pulse: 106   Resp: 22 SpO2: 94 % O2 Device: Nasal cannula Oxygen Delivery: 4 LPM  Vital Signs with Ranges  Temp:  [97.7  F (36.5  C)-98.9  F (37.2  C)] 98.9  F (37.2  C)  Pulse:  [103-122] 106  Resp:  [20-22] 22  BP: (118-178)/() 146/73  SpO2:  [91 %-100 %] 94 %  148 lbs 0 oz  Body mass index is 23.89 kg/m .    GENERAL APPEARANCE: Appears ill, jaundiced  EYES: Icteric  HENT: Oral cavity, bleeding/dried blood on gums  NECK: Supple  RESP: distant  CV: S1 S2 tachycardia  LYMPHATICS: edema  ABDOMEN: Firm, ascites, dressing on lateral abdominal wall  MS: tremors  SKIN: pressure dressing on left lateral abdominal wall  Neuro: Tremors      Data   Lab Results   Component Value Date    WBC 8.2 04/07/2022    WBC 9.7 04/06/2022    HGB 7.6 (L) 04/07/2022    HGB 7.1 (L) 04/07/2022    HGB 8.6 (L) 04/06/2022    HCT 21.9 (L) 04/07/2022    HCT 27.0 (L) 04/06/2022    PLT 97 (L) 04/07/2022     (L) 04/06/2022     (L) 04/07/2022     (L) 04/06/2022    POTASSIUM 4.1 04/07/2022    POTASSIUM 4.0 04/06/2022    CHLORIDE 93 (L) 04/07/2022    CHLORIDE 86 (L) 04/06/2022    CO2 25 04/07/2022    CO2 23 04/06/2022    BUN 6 (L) 04/07/2022    BUN 5 (L) 04/06/2022    CR 0.62 (L) 04/07/2022    CR 0.67 (L) 04/06/2022     (H) 04/07/2022     (H) 04/07/2022     (H) 04/07/2022     (H) 04/07/2022     (H) 04/06/2022     (H) 04/07/2022     (H) 04/06/2022     (H) 04/07/2022     (H) 04/06/2022    ALKPHOS 128 (H) 04/07/2022    ALKPHOS 158 (H) 04/06/2022     BILITOTAL 10.3 (H) 2022    BILITOTAL 11.7 (H) 2022    INR 1.86 (H) 2022    INR 1.83 (H) 2022    INR 1.72 (H) 2022     MICRO:  Blood cultures in process    RADIOLOGY:    Echocardiogram Complete    Result Date: 2022  [id number]^[place] [address]  Name: EYAL ROONEY MRN: [id number] : 1995 Study Date: 2022 08:27 AM Age: 26 yrs Gender: Male Patient Location: [facility] Reason For Study: SOB Ordering Physician: LAUREL ENRIQUEZ Performed By: DANII  BSA: 1.8 m2 Height: 66 in Weight: 148 lb HR: 110 BP: 136/65 mmHg ______________________________________________________________________________ Procedure Complete Portable Echo Adult. ______________________________________________________________________________ Interpretation Summary  Left ventricular size, wall motion and function are normal. The ejection fraction is 60-65%. Normal right ventricle size and systolic function. No hemodynamically significant valvular abnormalities on 2D or color flow imaging. ______________________________________________________________________________ Left Ventricle Left ventricular size, wall motion and function are normal. The ejection fraction is 60-65%. There is normal left ventricular wall thickness. Left ventricular diastolic function is normal. No regional wall motion abnormalities noted.  Right Ventricle Normal right ventricle size and systolic function.  Atria Normal left atrial size. Right atrial size is normal. There is no color Doppler evidence of an atrial shunt.  Mitral Valve Mitral valve leaflets appear normal. There is no evidence of mitral stenosis or clinically significant mitral regurgitation. There is trace mitral regurgitation.  Tricuspid Valve Tricuspid valve leaflets appear normal. Right ventricle systolic pressure estimate normal. There is trace tricuspid regurgitation.  Aortic Valve Aortic  valve leaflets appear normal. There is no evidence of aortic stenosis or clinically significant aortic regurgitation.  Pulmonic Valve The pulmonic valve is not well seen, but is grossly normal. This degree of valvular regurgitation is within normal limits.  Vessels The aorta root is normal. Normal size ascending aorta. IVC diameter >2.1 cm collapsing <50% with sniff suggests a high RA pressure estimated at 15 mmHg or greater.  Pericardium There is no pericardial effusion.  ______________________________________________________________________________ MMode/2D Measurements & Calculations IVSd: 0.95 cm LVIDd: 4.8 cm LVIDs: 2.7 cm LVPWd: 1.1 cm FS: 44.7 %  LV mass(C)d: 176.4 grams LV mass(C)dI: 100.2 grams/m2 Ao root diam: 2.1 cm asc Aorta Diam: 2.3 cm LVOT diam: 1.8 cm LVOT area: 2.5 cm2 LA Volume (BP): 65.4 ml RWT: 0.45  Doppler Measurements & Calculations MV E max luca: 178.0 cm/sec MV A max luca: 145.9 cm/sec MV E/A: 1.2 MV dec slope: 1103 cm/sec2 MV dec time: 0.16 sec Ao V2 max: 207.2 cm/sec Ao max P.0 mmHg Ao V2 mean: 145.5 cm/sec Ao mean P.8 mmHg Ao V2 VTI: 35.0 cm THOR(I,D): 2.4 cm2 THOR(V,D): 2.2 cm2 LV V1 max P.8 mmHg LV V1 max: 185.5 cm/sec LV V1 VTI: 33.7 cm SV(LVOT): 82.7 ml SI(LVOT): 47.0 ml/m2 PA V2 max: 167.0 cm/sec PA max P.1 mmHg PA acc time: 0.17 sec AV Luca Ratio (DI): 0.90 THOR Index (cm2/m2): 1.3  E/E' av.0 Lateral E/e': 11.6 Medial E/e': 14.3  ______________________________________________________________________________ Report approved by: Sofya Saucedo 2022 11:35 AM       US Lower Extremity Venous Duplex Bilateral    Result Date: 2022  EXAM: ULTRASOUND VENOUS BILATERAL LOWER EXTREMITIES WITH DOPPLER LOCATION: [facility] DATE/TIME: 2022 5:07 AM INDICATION: Tachycardia, leg swelling and shortness of breath. COMPARISON: None. TECHNIQUE: Venous Duplex ultrasound of bilateral lower extremities with and without compression, augmentation  and duplex. Color flow and spectral Doppler with waveform analysis performed. FINDINGS: Exam includes the common femoral, femoral, popliteal veins as well as segmentally visualized deep calf veins and greater saphenous vein. RIGHT: Normal compressibility of the common femoral, femoral, popliteal, posterior tibial, peroneal and great saphenous veins. Unremarkable Doppler waveform evaluation of the common femoral, femoral and popliteal veins. LEFT: Normal compressibility of the common femoral, femoral, popliteal, posterior tibial, peroneal and great saphenous veins. Unremarkable Doppler waveform evaluation of the common femoral, femoral and popliteal veins.     IMPRESSION: No evidence of thrombus in the major veins of bilateral lower extremities.         Abdomen US, limited (RUQ only)    Result Date: 4/7/2022  EXAM: US ABDOMEN LIMITED LOCATION: [facility] DATE/TIME: 4/6/2022 11:50 PM INDICATION: Elevated bili, looks like ascites, cirrhosis. COMPARISON: None. TECHNIQUE: Limited abdominal ultrasound. FINDINGS: GALLBLADDER: Gallbladder is distended with wall thickening and tumefactive sludge. BILE DUCTS: No biliary dilatation. The common duct measures 6 mm. LIVER: Hepatomegaly with coarsened hepatic echotexture. RIGHT KIDNEY: No hydronephrosis. PANCREAS: The visualized portions are normal. No significant ascites.     IMPRESSION: 1.  Hepatomegaly with coarsening of hepatic echotexture compatible with cirrhosis. 2.  Gallbladder distention with wall thickening. Wall thickening can be seen with hepatic parenchymal disease. Tumefactive sludge. No bile duct dilatation.     XR Chest Port 1 View    Result Date: 4/7/2022  EXAM: XR CHEST PORT 1 VIEW LOCATION: [facility] DATE/TIME: 4/7/2022 1:19 AM INDICATION: evaluate lung nodules, abnormal CT COMPARISON: 04/27/2013. CT abdomen pelvis from 04/07/2022.     IMPRESSION: Heart size within normal limits. Bilateral central and basilar  airspace infiltrates with mild basilar interstitial prominence. Findings likely reflect a degree of interstitial edema, likely with superimposed pneumonia or pneumonitis. Recommend follow-up chest radiographs to ensure resolution. No pneumothorax.    CT Abdomen Pelvis w Contrast    Result Date: 4/7/2022  EXAM: CT ABDOMEN PELVIS W CONTRAST LOCATION: [facility] DATE/TIME: 4/7/2022 12:37 AM INDICATION: Abdominal distension. COMPARISON: None. TECHNIQUE: CT scan of the abdomen and pelvis was performed following injection of IV contrast. Multiplanar reformats were obtained. Dose reduction techniques were used. CONTRAST: [id number] 80 ml. FINDINGS: LOWER CHEST: Multifocal rounded solid and groundglass nodular infiltrates with superimposed interstitial infiltrates in the visualized lungs. HEPATOBILIARY: Hepatomegaly. Cirrhotic appearance to the liver with trace perihepatic ascites. Gallbladder is distended with wall thickening and sludge. Upper abdominal varices compatible with portal hypertension. PANCREAS: Normal. SPLEEN: Splenomegaly. Splenorenal shunt compatible with portal hypertension. ADRENAL GLANDS: Normal. KIDNEYS/BLADDER: Normal. BOWEL: Normal. LYMPH NODES: Normal. VASCULATURE: Unremarkable. PELVIC ORGANS: Calcification vas deferens compatible with diabetes. Anasarca. MUSCULOSKELETAL: Normal.     IMPRESSION: 1.  Multifocal regions of solid, and groundglass nodularity in the visualized mid and lower lungs with superimposed background of mild interstitial infiltrates. 2.  Cirrhosis with splenomegaly and small volume ascites. Multiple varices compatible with portal hypertension. 3.  Gallbladder is distended with wall thickening which can be seen with parenchymal disease. Sludge within the gallbladder lumen. 4.  Anasarca. 5.  Calcification vas deferens compatible with diabetes.

## 2022-04-07 NOTE — ED PROVIDER NOTES
"eMERGENCY dEPARTMENT PROGRESS NOTE      Patient was signed out to me by Dr. Cui at 12:06 AM.  Plan to follow up on CT Abdomen Pelvis and Abdominal US.  1:19 AM I rechecked and updated on results. I also applied lidocaine with epinephrine to oozing wound on left-side of abdomen.    In brief, Dillon Salter is a 26 year old male with a history of DM2, alcoholic cirrhosis of liver with ascites, and alcohol abuse in remission, who presents for evaluation of wound check and alcohol problem. The patient reports that he has been talking to his primary care provider for \"a while\" about coming to the ED for detoxification for his alcohol dependence. He notes that he has been drinking a large quantity of alcohol (only beer) for \"too long.\" Approximately 4 hours ago, the patient \"knicked something\" onto his abdomen, sustaining a wound. The wound has not stopped bleeding, prompting him to be present in the ED. The last time the patient had alcohol was today prior to arrival. Per nurse, the patient has had jaundice for months. He has also had lower leg swelling for the past 3-4 days. The patient reportedly has an inflamed liver.     I interviewed and examined the patient.  He had a tiny spot that was oozing on his left mid abdominal wall.  Dr. Cui and injected it with a solution of lidocaine with epinephrine.  It was still oozing when I reexamined him.  I had the nurse put a Surgicel on it and dressed it with foam tape and gauze to get some pressure on it as well.    Pertinent lab and radiology results reviewed.    FINAL IMPRESSION    1. Alcoholic cirrhosis of liver with ascites (H)    2. Anemia, unspecified type    3. Bleeding gums    4. Sepsis, due to unspecified organism, unspecified whether acute organ dysfunction present (H)    5. Coagulopathy (H)           Jarred Reyes MD  04/07/22 [id number]    "

## 2022-04-07 NOTE — H&P
[facility] MEDICINE ADMISSION HISTORY AND PHYSICAL       Assessment & Plan      1. Alcohol abuse with the following complications     - Low Hgb (8+ from 10 recently) and platelet with coagulopathy - could be BM suppression from toxic effects of chronic ETOH use - he was given Vitamin K in ED. Denies black or red stools. If increase drop in Hgb, eval for GI bleed. Will send stool OB. Will give protonix. If Hgb 7, consider PRBC transfusion.  Consider GI consult if Hgb further dropping - r/o GIB    - Ascites and liver cirrhosis - small amount of ascites in CT scan, and abdomen is not peritoneal on exams. Low suspicion for SBP. CT scan showed -  Cirrhosis with splenomegaly and small volume ascites. Multiple varices compatible with portal hypertension  - Hyponatremia from 3rd spacing and from poor oral intake -- caution with fluid loading  - Weight loss unclear cause   - lactic acidosis could be from liver clearance issues, did not appear septic. Abdomen is benign.   - abnormal AST/ALT with suprisingly normal albumin level, no prior transfusion     2. Has ETOH level of 225, and did not seem to have strong symptoms/signs for ETOH withdrawal. No tremors, did not appear grossly anxious, no vomiting. He never had ETOH withdrawals in the past  - monitor symptoms  for now, he was given valium in ED   - if need CIWA order set, may need to adjust dosing some of meds given his small body frame and abnormal LFTs  - committed to stop drinking   - Check for viral hepatitis   - for now one dose of ativan and neurontin -- of note, he has baseline anxiety that unrelated to alcohol.   - FA,Vitamins and Thiamine    2. Elevated lactic acid - could be liver clearance issues, cant r/o infection - intra-abdominal source. However, abdomen is benign. No fever and WBC is normal. Not a whole lot of ascites as noted on CT.   - continue antibiotic for now, and stop if no evidence of infection   - repeat labs     3.  Poorly controlled T2DM, with recent a1C of 10  - sliding scale insulin/FS  - he stopped taking DM meds     4. Has HTN and dyslipidemia  -  May resume BP mes     5. Has autism/aspergers - seems high functioning    6. COVID negative and have no cough or breathing issues but CT scan showed ---  Multifocal regions of solid, and groundglass nodularity in the visualized mid and lower lungs with superimposed background of mild interstitial infiltrates.    - unclear etiology - malignancy or infectious like fungal infection. No recent trips, maybe had been in their cabin, he said, a pack of cigarette would last 5 months   - will get XR chest for now  - procalcitonin check   - ID consult    7. Blood tinge ascites, leaking from left abdominal wall - he was rubbing his belly and noticed blood on th left side of abdomen - ED was informed      455A -- Slept for at least an hour, but woke up feeling SOB, rales on exams, Chest XR showed - edema. Could be fluid overload from earlier fluid boluses. BNP mildly elevated. No wheezing. He was also complaining of feeling anxious again. . Consider ECHO - eval for Alcohol related cardmyopathy. PRN lasix 10 mgs x1    CIWA protocol --- Ativan ordered per order set. For Gabapentin, will start at 300 mgs q8. May modify dosing depends on his symptoms and severity. CIWA score now 11-12. He seemed responsive to earlier dose of ativan and 100 mgs of neurontn    650A - Hgb dropping, HOLD lovenox, NPO for now, GI consult        Med reconciliation -- Done   VTE prophylaxis: SCDs/walking - if staying more than 24 hrs, consider lovenox and if no evidence of bleeding   IV fluids: Per order set   Diet:  NPO for now -   Code Status: Full   COVID test result:  negative   COVID vaccination: completed   Barriers to discharge: admitting clinical condition  Discharge Disposition and goals:  Unable to determine at this point, pending clinical progress and response to treatment. Patient may need transfer to  "SNF or ACR if unsafe to go home and needed treatment inappropriate at home setting OR may need home health care evaluation if care can be delivered at home settings. Consider referral to care manager/    PPE - I was wearing PPE when I met the patient - N95 mask, Surgical mask, Isolation gown, Gloves, Safety glasses.      Care plan was created based on available information provided, including patient's condition at the time of encounter.   This plan was discussed with patient and/or family members using layman's terms and have agreed to proceed.   At the end of night shift (9PM - 730A), this case will be presented to the AM Hospitalist.    It is recommended to revise care plan and review history if there is change in condition and/or new clinical information is not available during my encounter.     All or some of home medication/s were not resumed on admission due to safety reasons or contraindications. Dosing and frequency may also have been modified. Please resume/review them during your visit.     70 minutes of total visit duration and greater than 50% was spent in direct evaluation of patient and coordination of care including discussion of diagnostic test results and recommended treatment. .      Freeman Posada MD, MPH, FACP, [facility]  Internal Medicine - Hospitalist        Chief Complaint      HISTORY     - Met him in the ED and his mother. He said, he has aspergers and mother takes care of him. They live together.     - He came in because he wanted detox. \"I drink pretty much everyday\". Started drinking since he was early 20s. No proibited drugs. Had treatment in [place] in 2018 for 24 months. No other treatment. No prior ETOH withdrawal.     - He also came in because he was rubbing his belly today and found blood oozing from left side of the abdomen, No trauma  - Mother was concern of bleeding \"gums\" but lips looked like had an old crusted blood.    - He also was concerned with leg swelling. No " pain.     - He reported he has depression/anxiety and has been prescribed fluoxetine and he has been taking them for 2 weeks now.     - He has been seeing a PCP from [facility] for issues related to ETOH use and abuse, T2DM, HTN, depression and anxiety.   - It was reported struggling through the pandemic to control above medical issues.   - Reports of drinking a case of beer a week. Losing a lot of weight. Recent clinic labs Hgb of 10.5, a1C of 10, and also has abnormal LFTs    - In the ED, lactic acid was 4+. His ETOH was 225. His Na was 130, and has elevated AST/ALT. His Mag was 1.8. His CBC with hgb of 8+, low platelets.    - He was tachycardic at 115. He was given IVF and antibiotics. COVID negative.     IMPRESSION:   1.  Multifocal regions of solid, and groundglass nodularity in the visualized mid and lower lungs with superimposed background of mild interstitial infiltrates.     2.  Cirrhosis with splenomegaly and small volume ascites. Multiple varices compatible with portal hypertension.     3.  Gallbladder is distended with wall thickening which can be seen with parenchymal disease. Sludge within the gallbladder lumen.     - ROS --- No headache. No dizziness. No weakness. No CP or SOB. No palpitations. No abdominal pain. No nausea or vomiting. No urinary symptoms. . Rest of 12 point ROS was reviewed and negative.       Past Medical History     Alcohol abuse, in remission 04/16/2018   Encounter for long-term (current) use of insulin 04/19/2013   Morbid obesity 12/17/2012   Diabetes mellitus type 2 in obese 02/07/2012   Disorder of forearm joint      Autism ([facility])       Diabetes mellitus ([facility]) 2/2012     Ear infection       Tonsillitis       Alcohol abuse, in remission         Surgical History           Family History      Hypertension Birth Mother        Alcohol/Drug Abuse Maternal Grandfather        Depression Maternal Grandmother        Hypertension Maternal Grandmother               Social History      .  Social  "History     Socioeconomic History     Marital status: Single     Spouse name: Not on file     Number of children: Not on file     Years of education: Not on file     Highest education level: Not on file   Occupational History     Not on file   Tobacco Use     Smoking status: Not on file     Smokeless tobacco: Not on file   Substance and Sexual Activity     Alcohol use: Not on file     Drug use: Not on file     Sexual activity: Not on file   Other Topics Concern     Not on file   Social History Narrative     Not on file     Social Determinants of Health     Financial Resource Strain: Not on file   Food Insecurity: Not on file   Transportation Needs: Not on file   Physical Activity: Not on file   Stress: Not on file   Social Connections: Not on file   Intimate Partner Violence: Not on file   Housing Stability: Not on file          Allergies        Allergies   Allergen Reactions     Latex Swelling         Prior to Admission Medications      No current facility-administered medications on file prior to encounter.  No current outpatient medications on file prior to encounter.          Review of Systems     A 12 point comprehensive review of systems was negative except as noted above in HPI.    PHYSICAL EXAMINATION       Vitals      Vitals: BP (!) 178/101   Pulse 118   Temp 97.7  F (36.5  C) (Oral)   Resp 20   Ht 1.676 m (5' 6\")   Wt 67.1 kg (148 lb)   SpO2 97%   BMI 23.89 kg/m    BMI= Body mass index is 23.89 kg/m .      Examination     General Appearance:  Alert, cooperative, no distress (+) jaundice   Head:    Normocephalic, without obvious abnormality, atraumatic  EENT:  PERRL, conjunctiva/corneas clear, EOM's intact.   Neck:   Supple, symmetrical, trachea midline, no adenopathy; no NVE  Back:  Symmetric, no curvature, no CVA tenderness  Chest/Lungs: Clear to auscultation bilaterally, respirations unlabored, No tenderness or deformity. No abdominal breathing or use of accessory muscles.   Heart:    Regular rate " and rhythm, S1 and S2 normal, no murmur, rub   or gallop  Abdomen: Soft, non-tender, bowel sounds active all four quadrants, not peritoneal on palpation. Not distended  Extremities:  Bilateral leg swelling   Skin:  Skin color, texture, turgor normal, no rashes or lesion. Has blood tinged ascites leaking from left abdominal wall.    Neurologic:  Awake and alert,  No lateralizing or localizing signs           Pertinent Lab     Results for orders placed or performed during the hospital encounter of 04/06/22   Basic metabolic panel   Result Value Ref Range    Sodium 130 (L) 136 - 145 mmol/L    Potassium 4.0 3.5 - 5.0 mmol/L    Chloride 86 (L) 98 - 107 mmol/L    Carbon Dioxide (CO2) 23 22 - 31 mmol/L    Anion Gap 21 (H) 5 - 18 mmol/L    Urea Nitrogen 5 (L) 8 - 22 mg/dL    Creatinine 0.67 (L) 0.70 - 1.30 mg/dL    Calcium 9.8 8.5 - 10.5 mg/dL    Glucose 374 (H) 70 - 125 mg/dL    GFR Estimate >90 >60 mL/min/1.73m2   Hepatic function panel   Result Value Ref Range    Bilirubin Total 11.7 (H) 0.0 - 1.0 mg/dL    Bilirubin Direct 4.1 (H) <=0.5 mg/dL    Protein Total 10.3 (H) 6.0 - 8.0 g/dL    Albumin 3.7 3.5 - 5.0 g/dL    Alkaline Phosphatase 158 (H) 45 - 120 U/L     (H) 0 - 40 U/L     (H) 0 - 45 U/L   Result Value Ref Range    Lipase 45 0 - 52 U/L   Result Value Ref Range    aPTT 49 (H) 22 - 38 Seconds   Result Value Ref Range    INR 1.72 (H) 0.85 - 1.15   Lactic acid whole blood   Result Value Ref Range    Lactic Acid 4.3 (HH) 0.7 - 2.0 mmol/L   Alcohol level blood   Result Value Ref Range    Alcohol, Blood 225 (H) None detected mg/dL   CBC with platelets and differential   Result Value Ref Range    WBC Count      RBC Count 2.55 (L) 4.40 - 5.90 10e6/uL    Hemoglobin 8.6 (L) 13.3 - 17.7 g/dL    Hematocrit 27.0 (L) 40.0 - 53.0 %     (H) 78 - 100 fL    MCH 33.7 (H) 26.5 - 33.0 pg    MCHC 31.9 31.5 - 36.5 g/dL    RDW 16.2 (H) 10.0 - 15.0 %    Platelet Count 111 (L) 150 - 450 10e3/uL   Result Value Ref Range     Magnesium 1.8 1.8 - 2.6 mg/dL           Pertinent Radiology

## 2022-04-07 NOTE — PHARMACY-ADMISSION MEDICATION HISTORY
Pharmacy Note - Admission Medication History    Pertinent Provider Information: patient has not taken insulin for ~ 1 year per patient's mother      ______________________________________________________________________    Prior To Admission (PTA) med list completed and updated in EMR.       PTA Med List   Medication Sig Last Dose     FLUoxetine (PROZAC) 10 MG capsule Take 10 mg by mouth At Bedtime 4/5/2022     hydrOXYzine (ATARAX) 25 MG tablet Take 25 mg by mouth every 8 hours as needed for anxiety  Past Month at Unknown time       Information source(s): Family member  Method of interview communication: in-person    Summary of Changes to PTA Med List  New: fluoxetine, atarax  Discontinued: none  Changed: none    Patient was asked about OTC/herbal products specifically.  PTA med list reflects this.    In the past week, patient estimated taking medication this percent of the time:  greater than 90%.    Allergies were reviewed, assessed, and updated with the patient.      Patient does not use any multi-dose medications prior to admission.    The information provided in this note is only as accurate as the sources available at the time of the update(s).    Thank you for the opportunity to participate in the care of this patient.    Mile Sanderson [facility]  4/7/2022 7:27 AM

## 2022-04-07 NOTE — ED PROVIDER NOTES
EMERGENCY DEPARTMENT ENCOUNTER       ED Course & Medical Decision Making     10:31 PM I met with the patient, obtained history, performed an initial exam, and discussed options and plan for diagnostics and treatment here in the ED. PPE worn: gloves, N95 mask  11:58 PM Discussed with Dr Posada (hospitalist) regarding admission for alcohol withdrawal, possible SBP, new anemia.  Still awaiting ultrasound and CT scan, will discuss these with Dr. Reyes the oncoming physician in the ED as the still need to be performed and followed up    Final Impression  26 year old male brought in by mother for evaluation of spontaneous bleeding on his left lower abdomen.  Patient states he just noticed some spontaneous bleeding on his left lower abdomen earlier this evening, prompting his visit today.  On evaluation patient does appear fairly jaundiced, does admit to a fairly significant alcohol use history.  Patient reports a history of autism, has been drinking heavily for years.  Patient does appear to have somewhat limited intellect, mother at bedside also appears inappropriately blindsided by his apparent cirrhosis and alcohol withdrawal.  Did inject a small amount of lidocaine with epinephrine near the spontaneous bleeding site to help slow the bleeding from epinephrine effect.  Hemoglobin returned at 8.6, platelets low at 111.  INR 1.72, given IV vitamin K 2 mg.  Total bilirubin 11.7, AST 200s, ALT 100s.  Lactic acid elevated 4.3, suspect this is related to dehydration and alcohol withdrawal, no fever, no peritoneal tenderness to be suggestive of SBP, though will cover with IV cefepime and obtain blood cultures.  Will admit for alcohol withdrawal and new onset cirrhosis.    Prior to making a final disposition on this patient the results of patient's tests and other diagnostic studies were discussed with the patient. All questions were answered. Patient expressed understanding of the plan and was amenable to  it.    Medications   ceFEPIme (MAXIPIME) 1g vial to attach to  ml bag for ADULTS or NS 50 ml bag for PEDS (has no administration in time range)   diazepam (VALIUM) injection 5 mg (has no administration in time range)   iopamidol (ISOVUE-370) solution 100 mL (has no administration in time range)   diazepam (VALIUM) injection 5 mg (5 mg Intravenous Given 4/6/22 2313)   phytonadione 2 mg in sodium chloride 0.9 % 50 mL intermittent infusion (0 mg Intravenous Stopped 4/6/22 2356)   0.9% sodium chloride BOLUS (1,000 mLs Intravenous New Bag 4/6/22 2338)       Final Impression     1. Alcoholic cirrhosis of liver with ascites (H)    2. Anemia, unspecified type    3. Bleeding gums    4. Sepsis, due to unspecified organism, unspecified whether acute organ dysfunction present (H)    5. Coagulopathy (H)    6. Alcohol withdrawal syndrome without complication (H)        Critical Care     Performed by: Dillon Cui MD  Authorized by: Dillon Cui MD                   Total critical care time: 30 minutes  Critical care was necessary to treat or prevent imminent or life-threatening deterioration of the following conditions:  Alcohol withdrawal requiring multiple doses of benzodiazepines, sepsis, possible SBP, intrinsic coagulopathy from alcohol abuse requiring vitamin K  Critical care was time spent personally by me on the following activities: development of treatment plan with patient or surrogate, discussions with consultants, examination of patient, evaluation of patient's response to treatment, obtaining history from patient or surrogate, ordering and performing treatments and interventions, ordering and review of laboratory studies, ordering and review of radiographic studies, re-evaluation of patient's condition and monitoring for potential decompensation.  Critical care time was exclusive of separately billable procedures and treating other patients.    Chief Complaint     Chief Complaint   Patient  "presents with     Wound Check     Alcohol Problem     No notes on file    HPI     Dillon Salter is a 26 year old male who presents for evaluation of wound check and alcohol problem.    Per nurse, the patient has had jaundice for months. He has also had lower leg swelling for the past 3-4 days. The patient reportedly has an inflamed liver.     The patient reports that he has been talking to his primary care provider for \"a while\" about coming to the ED for detoxification for his alcohol dependence. He notes that he has been drinking a large quantity of alcohol (only beer) for \"too long.\" Approximately 4 hours ago, the patient \"knicked something\" onto his abdomen, sustaining a wound. The wound has not stopped bleeding, prompting him to be present in the ED.     Per mother, his alcohol dependence has been going on for years. He does report a familial history of alcohol addiction. The last time the patient had alcohol was today prior to arrival. No abdominal pain, vomiting, or dark stools. The mother does add that the patient has been dealing with constipation for the past couple of months, but this has since resolved.     I, Justus Hackett am serving as a scribe to document services personally performed by Dr. Dillon Cui MD, based on my observation and the provider's statements to me. I, Dr. Dillon Cui MD attest that Justus Hackett is acting in a scribe capacity, has observed my performance of the services and has documented them in accordance with my direction.    Past Medical History     No past medical history on file.  No past surgical history on file.  No family history on file.   Social History     Tobacco Use     Smoking status: Not on file     Smokeless tobacco: Not on file   Substance Use Topics     Alcohol use: Not on file     Drug use: Not on file     Allergies   Allergen Reactions     Latex Swelling       Relevant past medical, surgical, family and social history as documented above, has been " "reviewed and discussed with patient. No changes or additions, unless otherwise noted in the HPI.    Current Medications     No current outpatient medications on file.      Review of Systems     Constitutional: Denies fever, chills  Eyes: Denies visual changes or discharge  HENT: Denies sore throat, ear pain  Respiratory: Denies cough or shortness of breath    Cardiovascular: Denies chest pain, palpitations or leg swelling  GI: Denies abdominal pain, nausea, vomiting  Musculoskeletal: Endorses lower leg swelling. Denies any new back pain   Skin: Endorses jaundice and abdominal wound. Denies rashes   Neurologic: Denies current headache, new weakness, focal weakness, or sensory changes    Psychiatric: Endorses alcohol addiction. Denies depression, suicidal ideation or homicidal ideation    Remainder of systems reviewed, unless noted in HPI all others negative.    Physical Exam     BP (!) 178/101   Pulse 118   Temp 97.7  F (36.5  C) (Oral)   Resp 20   Ht 1.676 m (5' 6\")   Wt 67.1 kg (148 lb)   SpO2 97%   BMI 23.89 kg/m    Constitutional: Awake, alert, in no acute distress  Head: Normocephalic, atraumatic.  ENT: Mucous membranes moist.   Eyes: PERRL, EOMI, Conjunctiva normal  Respiratory: Respirations even, unlabored. Lungs clear to ascultation bilaterally, in no acute respiratory distress.  Cardiovascular: Sinus tachycardia. +2 radial pulses, equal bilaterally.  +1 pitting edema of bilateral lower extremity  GI: Abdomen soft, distended with ascites. No guarding or rebound. Bowel sounds intact on all 4 quadrants.   : No CVA tenderness.   Musculoskeletal: Moves all 4 extremities equally, strength symmetrical on bilateral uppers and lowers.  Integument: Fairly jaundiced throughout.  Spontaneous punctate bleeding from left lower abdomen  Neurologic: Alert & oriented x 3, though does appear to have some cognitive limitations. Normal speech. Grossly normal motor and sensory function. No focal deficits noted.  Mildly " tremulous, signs of ongoing alcohol withdrawal  Psychiatric: Moderately anxious appearing    Labs & Imaging     Results for orders placed or performed during the hospital encounter of 04/06/22   Basic metabolic panel   Result Value Ref Range    Sodium 130 (L) 136 - 145 mmol/L    Potassium 4.0 3.5 - 5.0 mmol/L    Chloride 86 (L) 98 - 107 mmol/L    Carbon Dioxide (CO2) 23 22 - 31 mmol/L    Anion Gap 21 (H) 5 - 18 mmol/L    Urea Nitrogen 5 (L) 8 - 22 mg/dL    Creatinine 0.67 (L) 0.70 - 1.30 mg/dL    Calcium 9.8 8.5 - 10.5 mg/dL    Glucose 374 (H) 70 - 125 mg/dL    GFR Estimate >90 >60 mL/min/1.73m2   Hepatic function panel   Result Value Ref Range    Bilirubin Total 11.7 (H) 0.0 - 1.0 mg/dL    Bilirubin Direct 4.1 (H) <=0.5 mg/dL    Protein Total 10.3 (H) 6.0 - 8.0 g/dL    Albumin 3.7 3.5 - 5.0 g/dL    Alkaline Phosphatase 158 (H) 45 - 120 U/L     (H) 0 - 40 U/L     (H) 0 - 45 U/L   Result Value Ref Range    Lipase 45 0 - 52 U/L   Result Value Ref Range    aPTT 49 (H) 22 - 38 Seconds   Result Value Ref Range    INR 1.72 (H) 0.85 - 1.15   Asymptomatic COVID-19 Virus (Coronavirus) by PCR Nasopharyngeal    Specimen: Nasopharyngeal; Swab   Result Value Ref Range    SARS CoV2 PCR Negative Negative   Lactic acid whole blood   Result Value Ref Range    Lactic Acid 4.3 (HH) 0.7 - 2.0 mmol/L   Alcohol level blood   Result Value Ref Range    Alcohol, Blood 225 (H) None detected mg/dL   CBC with platelets and differential   Result Value Ref Range    WBC Count 9.7 4.0 - 11.0 10e3/uL    RBC Count 2.55 (L) 4.40 - 5.90 10e6/uL    Hemoglobin 8.6 (L) 13.3 - 17.7 g/dL    Hematocrit 27.0 (L) 40.0 - 53.0 %     (H) 78 - 100 fL    MCH 33.7 (H) 26.5 - 33.0 pg    MCHC 31.9 31.5 - 36.5 g/dL    RDW 16.2 (H) 10.0 - 15.0 %    Platelet Count 111 (L) 150 - 450 10e3/uL   Result Value Ref Range    Magnesium 1.8 1.8 - 2.6 mg/dL   Manual Differential   Result Value Ref Range    % Neutrophils 81 %    % Lymphocytes 12 %    %  Monocytes 4 %    % Eosinophils 0 %    % Basophils 3 %    Absolute Neutrophils 7.9 1.6 - 8.3 10e3/uL    Absolute Lymphocytes 1.2 0.8 - 5.3 10e3/uL    Absolute Monocytes 0.4 0.0 - 1.3 10e3/uL    Absolute Eosinophils 0.0 0.0 - 0.7 10e3/uL    Absolute Basophils 0.3 (H) 0.0 - 0.2 10e3/uL    RBC Morphology Confirmed RBC Indices     Platelet Assessment  Automated Count Confirmed. Platelet morphology is normal.     Automated Count Confirmed. Platelet morphology is normal.        Dillon Cui MD  04/07/22 0016

## 2022-04-07 NOTE — PROGRESS NOTES
[facility] Medicine PROGRESS NOTE      Identification/Summary:      Dillon Salter is a 26 year old male with a past medical history of ASD/Aspargar, DM2, anxiety, alcohol abuse x 8.5 years  who was admitted on 4/6/2022 for bleeding from abdominal wall, alcohol detoxification. He is severely ill with alcoholic hepatitis, coagulopathy, elevated lactic acid, anasarca etc.    Started on lorazepam per CIWA, pressure dressing on abdominal wall bleeding site, IV abx empirically, need CT chest 4/8 for eval of opacities.    Echo pending, GI consult, addiction medicine consult, infectious disease consult pending     Assessment & Plan      Alcohol Use Disorder- Severe  ( 8.5 years of heavy use)   Alcohol Liver disease ( hepatitis) with portal hypertensive ascites, varices,anasarca ,Splenomegaly,  coagulopathy, hyponatremia, thrombocytopenia   - alcohol cessation counseling, addiction medicine consult, will need rehab post discharge   - MVI, thiamine  - lorazepam per CIWA  - gabapentin   - GI consult      Steroids?  EGD?     Anemia and thrombocytopenia  - likely from chronic alcohol use   - acute drop in Hb may be related to abdominal wall bleeding, no s/s of GI bleed   - b12/folate/iron studies   - transfuse for Hb < 7   - Hb q 6   - repreat CBC in AM     Lactic Acidosis   - likely liver clearanace issue  - improved   - empiric abx for another 24 hours and if no fever or other s/s of infection then stop abx     Uncontrolled DM2  Significant hyperglycemia, has been off his medication for at least 1 year, was previously on insulin, has lost significant weight  Lantus 10 units every morning  Sliding scale every 4 hours while n.p.o. then changed to 4 times daily AC at bedtime    Basilar lung opacities  Unclear etiology, will need dedicated CT of the chest  Given hyperkalemia will diurese today, order CT scan of the chest for tomorrow morning  Infectious disease consult is pending    Blood leakage from the abdominal  wall  Scratch the abdominal wall, may have ruptured the dilated vessel, bleeding stopped with a pressure dressing, continue pressure dressing    History of hypertension and dyslipidemia  Currently not on any medications, continue same      Clinically Significant Risk Factors Present on Admission         # Hyponatremia: Na = 132 mmol/L (Ref range: 136 - 145 mmol/L) on admission, will monitor as appropriate     # Hypoalbuminemia: Albumin = 3.1 g/dL (Ref range: 3.5 - 5.0 g/dL) on admission, will monitor as appropriate   # Coagulation Defect: INR = 1.83 (Ref range: 0.85 - 1.15) and/or PTT = 49 Seconds (Ref range: 22 - 38 Seconds) on admission, will monitor for bleeding  # Thrombocytopenia: Plts = 97 10e3/uL (Ref range: 150 - 450 10e3/uL) on admission, will monitor for bleeding          Diet: NPO for Medical/Clinical Reasons Except for: Meds, Ice Chips  DVT Prophylaxis: elevated INR and increased risk of bleeding so no chemical prophylaxis   Negron Catheter: Not present  Central Lines: None    Code Status: Full Code    Discharge Planning   Anticipated Discharge in :3+ days   Expected Discharge Destination: Home   Milestones/Criteria For Discharge:- resolution of alcohol withdrawal and stable hb, liver disease   Disposition Plan   Expected Discharge: 04/11/2022   Anticipated discharge location:  Awaiting care coordination huddle       The patient's care was discussed with the Bedside Nurse, Patient and Patient's Family.      Interval History/Subjective:     Had breathing issue earlier and thinks it was related to anxiety. Denies any chest pain or sob now  No fever or chills   No n/v/d  No black stools   No abdominal pain  Scratched abdominal wall with subsequent bleeding   Alcohol  Beer, rum and wine for 8.5 years   Never had withdrawals as never had break   Saw PCP recently and was advised detox     Physical Exam/Objective:     Vitals I/O   Vital signs:  Temp: (P) 98.9  F (37.2  C) Temp src: (P) Oral BP: 128/60 Pulse: (P)  "106   Resp: 20 SpO2: 94 % O2 Device: (P) Nasal cannula Oxygen Delivery: 4 LPM Height: 167.6 cm (5' 6\") Weight: 67.1 kg (148 lb)  Estimated body mass index is 23.89 kg/m  as calculated from the following:    Height as of this encounter: 1.676 m (5' 6\").    Weight as of this encounter: 67.1 kg (148 lb).       No intake/output data recorded.     Vitals:    04/06/22 2237   Weight: 67.1 kg (148 lb)      Weight change:    Body mass index is 23.89 kg/m .    General: Awake alert and comfortable, + icterus   Lungs: basal crackles   Heart: S1, S2 without murmurs  Abdomen: Soft nontender, bowel sounds positive, pressure dressing over site of bleeding, intact with no saturation  CNS: Nonfocal  Extremities: + b/l LE edema       Medications:     Medications       ceFEPIme (MAXIPIME) IV  1 g Intravenous Q8H     cloNIDine  0.1 mg Oral Q8H     [Held by provider] enoxaparin ANTICOAGULANT  40 mg Subcutaneous Q24H     FLUoxetine  10 mg Oral At Bedtime     folic acid  1 mg Oral Daily     [START ON 4/9/2022] gabapentin  100 mg Oral Q8H     gabapentin  300 mg Oral Q8H     insulin aspart  1-6 Units Subcutaneous Q4H     insulin glargine  10 Units Subcutaneous QAM AC     magnesium sulfate  1 g Intravenous Once     multivitamin, therapeutic  1 tablet Oral Daily     pantoprazole (PROTONIX) IV  40 mg Intravenous Daily with breakfast     sodium chloride (PF)  3 mL Intracatheter Q8H     thiamine  100 mg Oral Daily       Data Reviewed   I personally reviewed all new medications, labs, imaging/diagnostics reports over the past 24 hours.     Pertinent findings includesee below .     Labs    Recent Labs   Lab 04/07/22  0749 04/07/22  0624 04/07/22  0138 04/06/22  2307   WBC  --  8.2  --  9.7   HGB  --  7.1*  --  8.6*   MCV  --  104*  --  106*   PLT  --  97*  --  111*   INR  --  1.83*  --  1.72*   NA  --  132*  --  130*   POTASSIUM  --  4.1  --  4.0   CHLORIDE  --  93*  --  86*   CO2  --  25  --  23   BUN  --  6*  --  5*   CR  --  0.62*  --  0.67* "   ANIONGAP  --  14  --  21*   SARTHAK  --  8.9  --  9.8   * 279* 297* 374*   ALBUMIN  --  3.1*  --  3.7   PROTTOTAL  --  8.5*  --  10.3*   BILITOTAL  --  10.3*  --  11.7*   ALKPHOS  --  128*  --  158*   ALT  --  110*  --  131*   AST  --  199*  --  237*   LIPASE  --   --   --  45       Imaging   Recent Results (from the past 24 hour(s))   Abdomen US, limited (RUQ only)    Narrative    EXAM: US ABDOMEN LIMITED  LOCATION: [facility]  DATE/TIME: 4/6/2022 11:50 PM    INDICATION: Elevated bili, looks like ascites, cirrhosis.  COMPARISON: None.  TECHNIQUE: Limited abdominal ultrasound.    FINDINGS:    GALLBLADDER: Gallbladder is distended with wall thickening and tumefactive sludge.    BILE DUCTS: No biliary dilatation. The common duct measures 6 mm.    LIVER: Hepatomegaly with coarsened hepatic echotexture.    RIGHT KIDNEY: No hydronephrosis.    PANCREAS: The visualized portions are normal.    No significant ascites.      Impression    IMPRESSION:  1.  Hepatomegaly with coarsening of hepatic echotexture compatible with cirrhosis.    2.  Gallbladder distention with wall thickening. Wall thickening can be seen with hepatic parenchymal disease. Tumefactive sludge. No bile duct dilatation.       CT Abdomen Pelvis w Contrast    Narrative    EXAM: CT ABDOMEN PELVIS W CONTRAST  LOCATION: [facility]  DATE/TIME: 4/7/2022 12:37 AM    INDICATION: Abdominal distension.  COMPARISON: None.  TECHNIQUE: CT scan of the abdomen and pelvis was performed following injection of IV contrast. Multiplanar reformats were obtained. Dose reduction techniques were used.  CONTRAST: [id number] 80 ml.    FINDINGS:   LOWER CHEST: Multifocal rounded solid and groundglass nodular infiltrates with superimposed interstitial infiltrates in the visualized lungs.    HEPATOBILIARY: Hepatomegaly. Cirrhotic appearance to the liver with trace perihepatic ascites. Gallbladder is distended with wall thickening  and sludge. Upper abdominal varices compatible with portal hypertension.    PANCREAS: Normal.    SPLEEN: Splenomegaly. Splenorenal shunt compatible with portal hypertension.    ADRENAL GLANDS: Normal.    KIDNEYS/BLADDER: Normal.    BOWEL: Normal.    LYMPH NODES: Normal.    VASCULATURE: Unremarkable.    PELVIC ORGANS: Calcification vas deferens compatible with diabetes. Anasarca.    MUSCULOSKELETAL: Normal.      Impression    IMPRESSION:   1.  Multifocal regions of solid, and groundglass nodularity in the visualized mid and lower lungs with superimposed background of mild interstitial infiltrates.    2.  Cirrhosis with splenomegaly and small volume ascites. Multiple varices compatible with portal hypertension.    3.  Gallbladder is distended with wall thickening which can be seen with parenchymal disease. Sludge within the gallbladder lumen.    4.  Anasarca.    5.  Calcification vas deferens compatible with diabetes.   XR Chest Port 1 View    Narrative    EXAM: XR CHEST PORT 1 VIEW  LOCATION: [facility]  DATE/TIME: 4/7/2022 1:19 AM    INDICATION: evaluate lung nodules, abnormal CT  COMPARISON: 04/27/2013. CT abdomen pelvis from 04/07/2022.      Impression    IMPRESSION: Heart size within normal limits. Bilateral central and basilar airspace infiltrates with mild basilar interstitial prominence. Findings likely reflect a degree of interstitial edema, likely with superimposed pneumonia or pneumonitis.   Recommend follow-up chest radiographs to ensure resolution. No pneumothorax.   US Lower Extremity Venous Duplex Bilateral    Narrative    EXAM: ULTRASOUND VENOUS BILATERAL LOWER EXTREMITIES WITH DOPPLER  LOCATION: [facility]  DATE/TIME: 4/7/2022 5:07 AM    INDICATION: Tachycardia, leg swelling and shortness of breath.  COMPARISON: None.    TECHNIQUE: Venous Duplex ultrasound of bilateral lower extremities with and without compression, augmentation and duplex. Color  flow and spectral Doppler with waveform analysis performed.    FINDINGS: Exam includes the common femoral, femoral, popliteal veins as well as segmentally visualized deep calf veins and greater saphenous vein.    RIGHT: Normal compressibility of the common femoral, femoral, popliteal, posterior tibial, peroneal and great saphenous veins. Unremarkable Doppler waveform evaluation of the common femoral, femoral and popliteal veins.  LEFT: Normal compressibility of the common femoral, femoral, popliteal, posterior tibial, peroneal and great saphenous veins. Unremarkable Doppler waveform evaluation of the common femoral, femoral and popliteal veins.      Impression    IMPRESSION: No evidence of thrombus in the major veins of bilateral lower extremities.                    EKG:      Nidhi Willett MD  Internal Medicine / Hospital medicine   [facility]  Securely message with the Vocera Web Console (learn more here)  Text page via [employer] ([email or web address])

## 2022-04-08 ENCOUNTER — APPOINTMENT (OUTPATIENT)
Dept: CT IMAGING | Facility: CLINIC | Age: 27
DRG: 871 | End: 2022-04-08
Attending: INTERNAL MEDICINE
Payer: COMMERCIAL

## 2022-04-08 PROBLEM — D63.8 ANEMIA OF CHRONIC ILLNESS: Status: ACTIVE | Noted: 2022-04-08

## 2022-04-08 PROBLEM — F84.5 ASPERGER'S DISORDER: Status: ACTIVE | Noted: 2022-04-08

## 2022-04-08 PROBLEM — S31.109A OPEN ABDOMINAL WALL WOUND: Status: ACTIVE | Noted: 2022-04-08

## 2022-04-08 PROBLEM — F84.5 ASPERGER'S DISORDER: Chronic | Status: ACTIVE | Noted: 2022-04-08

## 2022-04-08 PROBLEM — D62 ANEMIA DUE TO BLOOD LOSS, ACUTE: Status: ACTIVE | Noted: 2022-04-06

## 2022-04-08 PROBLEM — F10.930 ALCOHOL WITHDRAWAL, UNCOMPLICATED (H): Chronic | Status: ACTIVE | Noted: 2022-04-08

## 2022-04-08 PROBLEM — F10.929 ALCOHOL INTOXICATION (H): Chronic | Status: ACTIVE | Noted: 2022-04-08

## 2022-04-08 PROBLEM — F10.930 ALCOHOL WITHDRAWAL, UNCOMPLICATED (H): Status: ACTIVE | Noted: 2022-04-08

## 2022-04-08 PROBLEM — F10.11 ALCOHOL ABUSE, IN REMISSION: Status: ACTIVE | Noted: 2018-04-16

## 2022-04-08 PROBLEM — F10.929 ALCOHOL INTOXICATION (H): Status: ACTIVE | Noted: 2022-04-08

## 2022-04-08 PROBLEM — S31.109A OPEN ABDOMINAL WALL WOUND: Chronic | Status: ACTIVE | Noted: 2022-04-08

## 2022-04-08 LAB
ALBUMIN SERPL-MCNC: 3 G/DL (ref 3.5–5)
ALP SERPL-CCNC: 116 U/L (ref 45–120)
ALT SERPL W P-5'-P-CCNC: 96 U/L (ref 0–45)
ANION GAP SERPL CALCULATED.3IONS-SCNC: 13 MMOL/L (ref 5–18)
AST SERPL W P-5'-P-CCNC: 164 U/L (ref 0–40)
BILIRUB SERPL-MCNC: 12.5 MG/DL (ref 0–1)
BUN SERPL-MCNC: 11 MG/DL (ref 8–22)
CALCIUM SERPL-MCNC: 9.3 MG/DL (ref 8.5–10.5)
CHLORIDE BLD-SCNC: 90 MMOL/L (ref 98–107)
CO2 SERPL-SCNC: 28 MMOL/L (ref 22–31)
CREAT SERPL-MCNC: 0.71 MG/DL (ref 0.7–1.3)
ERYTHROCYTE [DISTWIDTH] IN BLOOD BY AUTOMATED COUNT: 16.4 % (ref 10–15)
GFR SERPL CREATININE-BSD FRML MDRD: >90 ML/MIN/1.73M2
GLUCOSE BLD-MCNC: 335 MG/DL (ref 70–125)
GLUCOSE BLDC GLUCOMTR-MCNC: 292 MG/DL (ref 70–99)
GLUCOSE BLDC GLUCOMTR-MCNC: 301 MG/DL (ref 70–99)
GLUCOSE BLDC GLUCOMTR-MCNC: 344 MG/DL (ref 70–99)
GLUCOSE BLDC GLUCOMTR-MCNC: 357 MG/DL (ref 70–99)
GLUCOSE BLDC GLUCOMTR-MCNC: 369 MG/DL (ref 70–99)
HCT VFR BLD AUTO: 22.5 % (ref 40–53)
HGB BLD-MCNC: 7.3 G/DL (ref 13.3–17.7)
HGB BLD-MCNC: 7.5 G/DL (ref 13.3–17.7)
HGB BLD-MCNC: 7.9 G/DL (ref 13.3–17.7)
INR PPP: 1.85 (ref 0.85–1.15)
LACTATE SERPL-SCNC: 0.8 MMOL/L (ref 0.7–2)
MAGNESIUM SERPL-MCNC: 1.8 MG/DL (ref 1.8–2.6)
MCH RBC QN AUTO: 33.8 PG (ref 26.5–33)
MCHC RBC AUTO-ENTMCNC: 32.4 G/DL (ref 31.5–36.5)
MCV RBC AUTO: 104 FL (ref 78–100)
PHOSPHATE SERPL-MCNC: 3.7 MG/DL (ref 2.5–4.5)
PLATELET # BLD AUTO: 91 10E3/UL (ref 150–450)
POTASSIUM BLD-SCNC: 3.7 MMOL/L (ref 3.5–5)
PROT SERPL-MCNC: 8.3 G/DL (ref 6–8)
RBC # BLD AUTO: 2.16 10E6/UL (ref 4.4–5.9)
SODIUM SERPL-SCNC: 131 MMOL/L (ref 136–145)
WBC # BLD AUTO: 8.1 10E3/UL (ref 4–11)

## 2022-04-08 PROCEDURE — 86038 ANTINUCLEAR ANTIBODIES: CPT | Performed by: PHYSICIAN ASSISTANT

## 2022-04-08 PROCEDURE — C9113 INJ PANTOPRAZOLE SODIUM, VIA: HCPCS | Performed by: INTERNAL MEDICINE

## 2022-04-08 PROCEDURE — 86015 ACTIN ANTIBODY EACH: CPT | Performed by: PHYSICIAN ASSISTANT

## 2022-04-08 PROCEDURE — 85018 HEMOGLOBIN: CPT | Performed by: INTERNAL MEDICINE

## 2022-04-08 PROCEDURE — 250N000011 HC RX IP 250 OP 636: Performed by: INTERNAL MEDICINE

## 2022-04-08 PROCEDURE — 85027 COMPLETE CBC AUTOMATED: CPT | Performed by: INTERNAL MEDICINE

## 2022-04-08 PROCEDURE — 83735 ASSAY OF MAGNESIUM: CPT | Performed by: INTERNAL MEDICINE

## 2022-04-08 PROCEDURE — 99232 SBSQ HOSP IP/OBS MODERATE 35: CPT | Mod: GT | Performed by: INTERNAL MEDICINE

## 2022-04-08 PROCEDURE — 86256 FLUORESCENT ANTIBODY TITER: CPT | Performed by: PHYSICIAN ASSISTANT

## 2022-04-08 PROCEDURE — 250N000013 HC RX MED GY IP 250 OP 250 PS 637: Performed by: INTERNAL MEDICINE

## 2022-04-08 PROCEDURE — 99233 SBSQ HOSP IP/OBS HIGH 50: CPT | Performed by: INTERNAL MEDICINE

## 2022-04-08 PROCEDURE — 36415 COLL VENOUS BLD VENIPUNCTURE: CPT | Performed by: INTERNAL MEDICINE

## 2022-04-08 PROCEDURE — 80053 COMPREHEN METABOLIC PANEL: CPT | Performed by: INTERNAL MEDICINE

## 2022-04-08 PROCEDURE — 84100 ASSAY OF PHOSPHORUS: CPT | Performed by: INTERNAL MEDICINE

## 2022-04-08 PROCEDURE — 99232 SBSQ HOSP IP/OBS MODERATE 35: CPT | Performed by: INTERNAL MEDICINE

## 2022-04-08 PROCEDURE — 120N000004 HC R&B MS OVERFLOW

## 2022-04-08 PROCEDURE — 85610 PROTHROMBIN TIME: CPT | Performed by: PHYSICIAN ASSISTANT

## 2022-04-08 PROCEDURE — 250N000013 HC RX MED GY IP 250 OP 250 PS 637: Performed by: PHYSICIAN ASSISTANT

## 2022-04-08 PROCEDURE — 83605 ASSAY OF LACTIC ACID: CPT | Performed by: INTERNAL MEDICINE

## 2022-04-08 PROCEDURE — 36415 COLL VENOUS BLD VENIPUNCTURE: CPT | Performed by: PHYSICIAN ASSISTANT

## 2022-04-08 PROCEDURE — 71250 CT THORAX DX C-: CPT

## 2022-04-08 PROCEDURE — 87486 CHLMYD PNEUM DNA AMP PROBE: CPT | Performed by: INTERNAL MEDICINE

## 2022-04-08 PROCEDURE — 82390 ASSAY OF CERULOPLASMIN: CPT | Performed by: PHYSICIAN ASSISTANT

## 2022-04-08 RX ORDER — DEXTROSE MONOHYDRATE 25 G/50ML
25-50 INJECTION, SOLUTION INTRAVENOUS
Status: DISCONTINUED | OUTPATIENT
Start: 2022-04-08 | End: 2022-04-08

## 2022-04-08 RX ORDER — LORAZEPAM 1 MG/1
1 TABLET ORAL EVERY 30 MIN PRN
Status: DISCONTINUED | OUTPATIENT
Start: 2022-04-08 | End: 2022-04-12 | Stop reason: HOSPADM

## 2022-04-08 RX ORDER — CLONIDINE HYDROCHLORIDE 0.1 MG/1
0.1 TABLET ORAL EVERY 8 HOURS PRN
Status: DISCONTINUED | OUTPATIENT
Start: 2022-04-08 | End: 2022-04-11

## 2022-04-08 RX ORDER — LACTULOSE 10 G/15ML
10 SOLUTION ORAL 3 TIMES DAILY
Status: DISCONTINUED | OUTPATIENT
Start: 2022-04-08 | End: 2022-04-09

## 2022-04-08 RX ORDER — NICOTINE POLACRILEX 4 MG
15-30 LOZENGE BUCCAL
Status: DISCONTINUED | OUTPATIENT
Start: 2022-04-08 | End: 2022-04-08

## 2022-04-08 RX ORDER — DOXYCYCLINE 100 MG/10ML
100 INJECTION, POWDER, LYOPHILIZED, FOR SOLUTION INTRAVENOUS EVERY 12 HOURS
Status: DISCONTINUED | OUTPATIENT
Start: 2022-04-08 | End: 2022-04-11

## 2022-04-08 RX ORDER — GABAPENTIN 100 MG/1
100 CAPSULE ORAL 3 TIMES DAILY
Status: DISCONTINUED | OUTPATIENT
Start: 2022-04-08 | End: 2022-04-12 | Stop reason: HOSPADM

## 2022-04-08 RX ORDER — LORAZEPAM 2 MG/ML
1 INJECTION INTRAMUSCULAR EVERY 30 MIN PRN
Status: DISCONTINUED | OUTPATIENT
Start: 2022-04-08 | End: 2022-04-12 | Stop reason: HOSPADM

## 2022-04-08 RX ADMIN — FUROSEMIDE 20 MG: 10 INJECTION, SOLUTION INTRAVENOUS at 23:15

## 2022-04-08 RX ADMIN — LORAZEPAM 1 MG: 1 TABLET ORAL at 03:12

## 2022-04-08 RX ADMIN — CEFTAZIDIME 2 G: 2 INJECTION, POWDER, FOR SOLUTION INTRAVENOUS at 15:20

## 2022-04-08 RX ADMIN — DOXYCYCLINE 100 MG: 100 INJECTION, POWDER, LYOPHILIZED, FOR SOLUTION INTRAVENOUS at 16:50

## 2022-04-08 RX ADMIN — GABAPENTIN 100 MG: 100 CAPSULE ORAL at 21:43

## 2022-04-08 RX ADMIN — LORAZEPAM 1 MG: 1 TABLET ORAL at 17:08

## 2022-04-08 RX ADMIN — LORAZEPAM 1 MG: 1 TABLET ORAL at 18:20

## 2022-04-08 RX ADMIN — PANTOPRAZOLE SODIUM 40 MG: 40 INJECTION, POWDER, FOR SOLUTION INTRAVENOUS at 09:02

## 2022-04-08 RX ADMIN — INSULIN GLARGINE 10 UNITS: 100 INJECTION, SOLUTION SUBCUTANEOUS at 09:12

## 2022-04-08 RX ADMIN — LORAZEPAM 1 MG: 1 TABLET ORAL at 00:24

## 2022-04-08 RX ADMIN — LACTULOSE 10 G: 10 SOLUTION ORAL at 09:02

## 2022-04-08 RX ADMIN — GABAPENTIN 300 MG: 300 CAPSULE ORAL at 06:05

## 2022-04-08 RX ADMIN — FOLIC ACID 1 MG: 1 TABLET ORAL at 09:02

## 2022-04-08 RX ADMIN — THERA TABS 1 TABLET: TAB at 09:02

## 2022-04-08 RX ADMIN — HYDROXYZINE HYDROCHLORIDE 25 MG: 25 TABLET ORAL at 22:05

## 2022-04-08 RX ADMIN — LORAZEPAM 1 MG: 1 TABLET ORAL at 06:39

## 2022-04-08 RX ADMIN — Medication 100 MG: at 09:02

## 2022-04-08 RX ADMIN — CEFTAZIDIME 2 G: 2 INJECTION, POWDER, FOR SOLUTION INTRAVENOUS at 23:22

## 2022-04-08 RX ADMIN — FUROSEMIDE 20 MG: 10 INJECTION, SOLUTION INTRAVENOUS at 15:20

## 2022-04-08 RX ADMIN — FLUOXETINE 10 MG: 10 CAPSULE ORAL at 21:43

## 2022-04-08 RX ADMIN — CEFTAZIDIME 2 G: 2 INJECTION, POWDER, FOR SOLUTION INTRAVENOUS at 06:06

## 2022-04-08 RX ADMIN — FUROSEMIDE 20 MG: 10 INJECTION, SOLUTION INTRAVENOUS at 06:06

## 2022-04-08 RX ADMIN — LACTULOSE 10 G: 10 SOLUTION ORAL at 21:46

## 2022-04-08 RX ADMIN — CLONIDINE HYDROCHLORIDE 0.1 MG: 0.1 TABLET ORAL at 06:05

## 2022-04-08 RX ADMIN — Medication 5 MG: at 09:03

## 2022-04-08 RX ADMIN — LACTULOSE 10 G: 10 SOLUTION ORAL at 15:20

## 2022-04-08 ASSESSMENT — ACTIVITIES OF DAILY LIVING (ADL)
ADLS_ACUITY_SCORE: 10
ADLS_ACUITY_SCORE: 6
ADLS_ACUITY_SCORE: 10
ADLS_ACUITY_SCORE: 6
ADLS_ACUITY_SCORE: 6
ADLS_ACUITY_SCORE: 10
ADLS_ACUITY_SCORE: 6
ADLS_ACUITY_SCORE: 10
ADLS_ACUITY_SCORE: 6
ADLS_ACUITY_SCORE: 6
ADLS_ACUITY_SCORE: 10
DEPENDENT_IADLS:: INDEPENDENT
ADLS_ACUITY_SCORE: 10
ADLS_ACUITY_SCORE: 6
ADLS_ACUITY_SCORE: 10
ADLS_ACUITY_SCORE: 6
ADLS_ACUITY_SCORE: 10

## 2022-04-08 NOTE — PROGRESS NOTES
"GI Progress Note  Dillon Rooney  -02     Subjective:   Tired and fatigued.  Needs oxygen to keep sats up.  Had one BM since admission.  On lactulose BID.  Had ativan per [facility] protocol.     Objective:   /73 (BP Location: Left arm, Cuff Size: Adult Small)   Pulse 104   Temp 97.8  F (36.6  C) (Axillary)   Resp 20   Ht 1.676 m (5' 6\")   Wt 71 kg (156 lb 9.6 oz)   SpO2 97%   BMI 25.28 kg/m    Body mass index is 25.28 kg/m .   Gen: No acute distress  Cardio: RRR  GI: Distended, BS positive, soft, non-tender. No guarding.  Ext: bilateral LE pitting edema.  Neuro: A & O. Minimal asterixis.  Nods off easily during visit today.     Laboratory  Recent Labs   Lab 22  1758 22  1233 22   WBC 8.1  --   --  8.2 9.7   RBC 2.16*  --   --  2.10* 2.55*   HGB 7.3* 7.4* 7.6* 7.1* 8.6*   HCT 22.5*  --   --  21.9* 27.0*   *  --   --  104* 106*   MCH 33.8*  --   --  33.8* 33.7*   MCHC 32.4  --   --  32.4 31.9   RDW 16.4*  --   --  16.4* 16.2*   PLT 91*  --   --  97* 111*      Recent Labs   Lab 22   * 132* 130*   CO2 28 25 23   BUN 11 6* 5*     Recent Labs   Lab 22   ALKPHOS 116 128* 158*   * 199* 237*   ALT 96* 110* 131*     Lab Results   Component Value Date    INR 1.85 (H) 2022    INR 1.86 (H) 2022    INR 1.83 (H) 2022       Echocardiogram Complete    Result Date: 2022  [id number]^MINA^[place] [address]  Name: DILLON ROONEY MRN: [id number] : 1995 Study Date: 2022 08:27 AM Age: 26 yrs Gender: Male Patient Location: [facility] Reason For Study: SOB Ordering Physician: LAUREL ENRIQUEZ Performed By: DANII  BSA: 1.8 m2 Height: 66 in Weight: 148 lb HR: 110 BP: 136/65 mmHg ______________________________________________________________________________ Procedure " Complete Portable Echo Adult. ______________________________________________________________________________ Interpretation Summary  Left ventricular size, wall motion and function are normal. The ejection fraction is 60-65%. Normal right ventricle size and systolic function. No hemodynamically significant valvular abnormalities on 2D or color flow imaging. ______________________________________________________________________________ Left Ventricle Left ventricular size, wall motion and function are normal. The ejection fraction is 60-65%. There is normal left ventricular wall thickness. Left ventricular diastolic function is normal. No regional wall motion abnormalities noted.  Right Ventricle Normal right ventricle size and systolic function.  Atria Normal left atrial size. Right atrial size is normal. There is no color Doppler evidence of an atrial shunt.  Mitral Valve Mitral valve leaflets appear normal. There is no evidence of mitral stenosis or clinically significant mitral regurgitation. There is trace mitral regurgitation.  Tricuspid Valve Tricuspid valve leaflets appear normal. Right ventricle systolic pressure estimate normal. There is trace tricuspid regurgitation.  Aortic Valve Aortic valve leaflets appear normal. There is no evidence of aortic stenosis or clinically significant aortic regurgitation.  Pulmonic Valve The pulmonic valve is not well seen, but is grossly normal. This degree of valvular regurgitation is within normal limits.  Vessels The aorta root is normal. Normal size ascending aorta. IVC diameter >2.1 cm collapsing <50% with sniff suggests a high RA pressure estimated at 15 mmHg or greater.  Pericardium There is no pericardial effusion.  ______________________________________________________________________________ MMode/2D Measurements & Calculations IVSd: 0.95 cm LVIDd: 4.8 cm LVIDs: 2.7 cm LVPWd: 1.1 cm FS: 44.7 %  LV mass(C)d: 176.4 grams LV mass(C)dI: 100.2 grams/m2 Ao root diam:  2.1 cm asc Aorta Diam: 2.3 cm LVOT diam: 1.8 cm LVOT area: 2.5 cm2 LA Volume (BP): 65.4 ml RWT: 0.45  Doppler Measurements & Calculations MV E max luca: 178.0 cm/sec MV A max luca: 145.9 cm/sec MV E/A: 1.2 MV dec slope: 1103 cm/sec2 MV dec time: 0.16 sec Ao V2 max: 207.2 cm/sec Ao max P.0 mmHg Ao V2 mean: 145.5 cm/sec Ao mean P.8 mmHg Ao V2 VTI: 35.0 cm THOR(I,D): 2.4 cm2 THOR(V,D): 2.2 cm2 LV V1 max P.8 mmHg LV V1 max: 185.5 cm/sec LV V1 VTI: 33.7 cm SV(LVOT): 82.7 ml SI(LVOT): 47.0 ml/m2 PA V2 max: 167.0 cm/sec PA max P.1 mmHg PA acc time: 0.17 sec AV Luca Ratio (DI): 0.90 THOR Index (cm2/m2): 1.3  E/E' av.0 Lateral E/e': 11.6 Medial E/e': 14.3  ______________________________________________________________________________ Report approved by: Sofya Saucedo 2022 11:35 AM       US Lower Extremity Venous Duplex Bilateral    Result Date: 2022  EXAM: ULTRASOUND VENOUS BILATERAL LOWER EXTREMITIES WITH DOPPLER LOCATION: [facility] DATE/TIME: 2022 5:07 AM INDICATION: Tachycardia, leg swelling and shortness of breath. COMPARISON: None. TECHNIQUE: Venous Duplex ultrasound of bilateral lower extremities with and without compression, augmentation and duplex. Color flow and spectral Doppler with waveform analysis performed. FINDINGS: Exam includes the common femoral, femoral, popliteal veins as well as segmentally visualized deep calf veins and greater saphenous vein. RIGHT: Normal compressibility of the common femoral, femoral, popliteal, posterior tibial, peroneal and great saphenous veins. Unremarkable Doppler waveform evaluation of the common femoral, femoral and popliteal veins. LEFT: Normal compressibility of the common femoral, femoral, popliteal, posterior tibial, peroneal and great saphenous veins. Unremarkable Doppler waveform evaluation of the common femoral, femoral and popliteal veins.     IMPRESSION: No evidence of thrombus in the major veins of  bilateral lower extremities.         Abdomen US, limited (RUQ only)    Result Date: 4/7/2022  EXAM: US ABDOMEN LIMITED LOCATION: [facility] DATE/TIME: 4/6/2022 11:50 PM INDICATION: Elevated bili, looks like ascites, cirrhosis. COMPARISON: None. TECHNIQUE: Limited abdominal ultrasound. FINDINGS: GALLBLADDER: Gallbladder is distended with wall thickening and tumefactive sludge. BILE DUCTS: No biliary dilatation. The common duct measures 6 mm. LIVER: Hepatomegaly with coarsened hepatic echotexture. RIGHT KIDNEY: No hydronephrosis. PANCREAS: The visualized portions are normal. No significant ascites.     IMPRESSION: 1.  Hepatomegaly with coarsening of hepatic echotexture compatible with cirrhosis. 2.  Gallbladder distention with wall thickening. Wall thickening can be seen with hepatic parenchymal disease. Tumefactive sludge. No bile duct dilatation.     XR Chest Port 1 View    Result Date: 4/7/2022  EXAM: XR CHEST PORT 1 VIEW LOCATION: [facility] DATE/TIME: 4/7/2022 1:19 AM INDICATION: evaluate lung nodules, abnormal CT COMPARISON: 04/27/2013. CT abdomen pelvis from 04/07/2022.     IMPRESSION: Heart size within normal limits. Bilateral central and basilar airspace infiltrates with mild basilar interstitial prominence. Findings likely reflect a degree of interstitial edema, likely with superimposed pneumonia or pneumonitis. Recommend follow-up chest radiographs to ensure resolution. No pneumothorax.    CT Abdomen Pelvis w Contrast    Result Date: 4/7/2022  EXAM: CT ABDOMEN PELVIS W CONTRAST LOCATION: [facility] DATE/TIME: 4/7/2022 12:37 AM INDICATION: Abdominal distension. COMPARISON: None. TECHNIQUE: CT scan of the abdomen and pelvis was performed following injection of IV contrast. Multiplanar reformats were obtained. Dose reduction techniques were used. CONTRAST: [id number] 80 ml. FINDINGS: LOWER CHEST: Multifocal rounded solid and  groundglass nodular infiltrates with superimposed interstitial infiltrates in the visualized lungs. HEPATOBILIARY: Hepatomegaly. Cirrhotic appearance to the liver with trace perihepatic ascites. Gallbladder is distended with wall thickening and sludge. Upper abdominal varices compatible with portal hypertension. PANCREAS: Normal. SPLEEN: Splenomegaly. Splenorenal shunt compatible with portal hypertension. ADRENAL GLANDS: Normal. KIDNEYS/BLADDER: Normal. BOWEL: Normal. LYMPH NODES: Normal. VASCULATURE: Unremarkable. PELVIC ORGANS: Calcification vas deferens compatible with diabetes. Anasarca. MUSCULOSKELETAL: Normal.     IMPRESSION: 1.  Multifocal regions of solid, and groundglass nodularity in the visualized mid and lower lungs with superimposed background of mild interstitial infiltrates. 2.  Cirrhosis with splenomegaly and small volume ascites. Multiple varices compatible with portal hypertension. 3.  Gallbladder is distended with wall thickening which can be seen with parenchymal disease. Sludge within the gallbladder lumen. 4.  Anasarca. 5.  Calcification vas deferens compatible with diabetes.     Assessment:   1.  Cirrhosis, alcoholic.  Newly diagnosed.  MELD-Na 25 on admission.  A.  Encephalopathy -has had change in sleep-wake cycle and confusion prior to admission.  Started lactulose yestrday - 10g BID.  B.  Coagulopathy -INR 1.72 and admit; 1.86 today.  C.  Ascites-small volume per imaging.  Looks to be accumulating more now. Exam not concerning for spontaneous bacterial peritonitis.  D.  Thrombocytopenia  E.  Hyperbilirubinemia  Will need outpatient follow-up with [facility].  Check additional serologies to assess for other underlying causes of liver disease -- Hep B and C neg.  Serum iron normal; ferritin mildly up but likely due to acute phase reactant and EtOH.  Ceruloplasmin, DIXIE, F-Actin still pending.   2.  Anemia.  No evidence of overt GI bleeding.  Suspect this is likely bone marrow  suppression from alcoholism.  Drop in hemoglobin after admit likely related to hemodilution after receiving total of 2 L fluid bolus. Hgb stable since.  Patient will need EGD, as CT scan does show varices in upper abdomen.  If he remains without evidence of upper GI bleed, anticipate this will be done as an outpatient after acute issues stabilize and he has been given time to recover.  Macrocytosis - likely related to alcoholism.  B12 and folic acid checked -- not depleted.  3.  Alcoholic hepatitis.  ,  on admit.  Enzymes trending down. T bili lagging behind -- typical course with EtOH hepatitis.   4.  Hypoxemia - on O2 to keep sats up.  Bilateral lung opacities. On antibiotics.  Noted plan for chest CT per primary service.  4.  Elevated lactic acid -improving.  4.  Asperger's.  Resides with his mother.  5.  Depression - triggers his alcohol use.  6.  Alcoholism.  EtOH level 225 on admission.  Monitoring for withdrawal.    Patient Active Problem List   Diagnosis     Bleeding gums     Alcoholic cirrhosis of liver with ascites (H)     Anemia due to blood loss, acute     Diabetes mellitus type 2 in obese (H)     Alcohol intoxication (H)     Open abdominal wall wound     Autism     Benign essential hypertension     Anemia of chronic illness      Plan:   1.  Watch LFTs, INR, MELD.  2.  Monitor hemoglobin, watch stools; remain alert for any evidence of active GI bleeding.  If so, then EGD.  If no evidence of active GI bleeding while admitted, then likely outpatient EGD for further assessment of varices and possible intervention.  3.  Additional liver serologies pending - as above.   4.  Alcohol cessation.  Discussed with patient and his mother.  5.  Increase lactulose to TID dosing.  Titrate to 3 loose stools per day.  Discussed with patient and his mother.  6.  High-protein, low sodium diet.  7.  Outpatient follow-up in GI pathology clinic. [facility] will contact pt to arrange.  8.  Chest CT per Primary  service.    Thank you.  Jovani Sanderson PA-C  [facility]  [phone]

## 2022-04-08 NOTE — PROGRESS NOTES
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  Presents with end-stage liver failure hepatic cirrhosis; abdominal wound which is minor; alcohol withdrawal which is currently #1 problem  After things stabilize he will be discharged home in the care of his parents we had a good talk today  Eventually if he remains sober he will need to see hepatology at the [facility] GI consultation appreciated here is being treated for hepatic encephalopathy and withdrawal on CIWA protocol    Plan continue same orders with CIWA protocol;  treatment; broad-spectrum IV antibiotic for now; CT scan today to check on infiltrates    Consultant help greatly appreciated from gastroenterology, infectious disease, addiction medicine          Social History     Social History Narrative    26-year-old history of autism/Asperger's on the spectrum graduated high school at [employer] worked at  and then another  place until the Covid epidemic in 2020 lives with parents (Leticia and Wes) socially isolated drinks alcohol beer daily        End-stage cirrhosis noted on admission to  April 2022         Assessment/Plan/Narrative    Active Hospital Problems    Alcohol withdrawal, uncomplicated (H)      Alcohol intoxication (H)      Open abdominal wall wound      Asperger's disorder      Benign essential hypertension      Anemia of chronic illness      Bleeding gums      Alcoholic cirrhosis of liver with ascites (H)      Anemia due to blood loss, acute      Diabetes mellitus type 2 in obese (H)          Code Status full code    Discharge Planning Home with parents    Subjective:  He is sedated he is being treated on the CIWA protocol    Objective:  Less than 10 mL of urine   Vital signs in last 24 hours  Temp:  [97.8  F (36.6  C)-100.5  F (38.1  C)] 98.7  F (37.1  " C)  Pulse:  [101-112] 101  Resp:  [20-26] 22  BP: (126-148)/() 126/69  SpO2:  [75 %-98 %] 95 %  Weight:       Intake/Output last 3 shifts  I/O last 3 completed shifts:  In: 600 [P.O.:600]  Out: 1300 [Urine:1300]  Intake/Output this shift:  I/O this shift:  In: -   Out: 600 [Urine:600]      Physical Exam  General: Deeply jaundiced sedated Mom is close at bedside in a recliner chair  VS-see above  HEENT:  Lungs:  Cor:  ABD:  Ext :  Neuro: He is able to answer some questions he voices appreciation of his care here (\"I guess I'm some done drinking\")    Pertinent Labs   Lab Results   Component Value Date    WBC 8.1 04/08/2022    HGB 7.3 (L) 04/08/2022    HCT 22.5 (L) 04/08/2022     (H) 04/08/2022    PLT 91 (L) 04/08/2022     Lab Results   Component Value Date    BUN 11 04/08/2022     (L) 04/08/2022    CO2 28 04/08/2022         Sergio Chávez MD.             "

## 2022-04-08 NOTE — PROGRESS NOTES
[facility]    Infectious Disease Progress Note    Date of Service (when I saw the patient): 04/08/2022     Assessment & Plan   Dillon Salter is a 26 year old male who was admitted on 4/6/2022.   1. Alcoholic liver disease, intoxication  2. Type 2 diabetes, hypertension  3. Cirrhosis jaundice portal hypertension anasarca splenomegaly coagulopathy thrombocytopenia  4. Bleeding from abdominal wound, pressure dressing in place  5. Gallbladder distention thickening  6. On alcohol withdrawal protocol  7. Pulmonary infiltrates   1.  Multifocal groundglass consolidative nodular opacities suggestive of multifocal infection. Other considerations could include septic emboli in the appropriate clinical context given the nodular and somewhat peripheral distribution. 2.  Manifestations of third spacing including pulmonary edema, bilateral effusions, and anasarca. 3.  Partially visualized cirrhosis and portal hypertension.        Recommendations    1. Follow blood cultures  2. Empiric ceftazidime and doxycycline  3. Consider Pulmonary input- primary team to coordinate  4. Resp viral panel, sputum culture  5. De-escalate/focus antibiotics in 48 hours depending on culture results and clinical response  6. GI following  7. Discussed with patient's parents at bedside    Roxy Estrella MD  [facility]  [phone]        Interval History     Alcohol withdrawal protocol, somnolent  Wakes up during exam  Patient's parents at bedside had questions, answered    Physical Exam   Temp: 99.4  F (37.4  C) Temp src: Oral BP: 133/73 Pulse: 101   Resp: 20 SpO2: 93 % O2 Device: Nasal cannula Oxygen Delivery: 4 LPM  Vitals:    04/06/22 2237 04/07/22 1624   Weight: 67.1 kg (148 lb) 71 kg (156 lb 9.6 oz)     Vital Signs with Ranges  Temp:  [97.8  F (36.6  C)-100.5  F (38.1  C)] 99.4  F (37.4  C)  Pulse:  [101-107] 101  Resp:  [20-22] 20  BP: (126-144)/() 133/73  SpO2:  [75 %-98 %] 93  %    Constitutional: Somnolent  Lungs: Supplemental oxygen, distant  Cardiovascular: S1 S2  Abdomen: firm, ascites  Skin: dressing on Left lateral abdominal wall  Neuro: somnolent    Medications       cefTAZidime  2 g Intravenous Q8H     FLUoxetine  10 mg Oral At Bedtime     folic acid  1 mg Oral Daily     furosemide  20 mg Intravenous Q8H     [START ON 4/9/2022] gabapentin  100 mg Oral Q8H     gabapentin  300 mg Oral Q8H     insulin aspart  1-7 Units Subcutaneous TID AC     insulin aspart  1-5 Units Subcutaneous At Bedtime     [START ON 4/9/2022] insulin glargine  14 Units Subcutaneous QAM AC     lactulose  10 g Oral TID     multivitamin, therapeutic  1 tablet Oral Daily     pantoprazole (PROTONIX) IV  40 mg Intravenous Daily with breakfast     phytonadione  5 mg Oral Daily     sodium chloride (PF)  3 mL Intracatheter Q8H     thiamine  100 mg Oral Daily       Data   All microbiology laboratory data reviewed.  Recent Labs   Lab Test 04/08/22  1139 04/08/22  0254 04/07/22  1758 04/07/22  1233 04/07/22  0624 04/06/22  2307   WBC  --  8.1  --   --  8.2 9.7   HGB 7.5* 7.3* 7.4*   < > 7.1* 8.6*   HCT  --  22.5*  --   --  21.9* 27.0*   MCV  --  104*  --   --  104* 106*   PLT  --  91*  --   --  97* 111*    < > = values in this interval not displayed.     Recent Labs   Lab Test 04/08/22  0254 04/07/22  0624 04/06/22  2307   CR 0.71 0.62* 0.67*     RADIOLOGY:  04/06/2022 2334 04/08/2022 0713 Blood Culture Peripheral Blood [[id number]]    Peripheral Blood    Preliminary result Component Value   No component results              04/06/2022 2334 04/08/2022 0932 Blood Culture Peripheral Blood [[id number]]   Peripheral Blood    Preliminary result Component Value   Culture No growth after 1 day P              04/06/2022 2259 04/06/2022 2354 Asymptomatic COVID-19 Virus (Coronavirus) by PCR Nasopharyngeal [[id number]]    Swab from Nasopharyngeal    Final result Component Value   SARS CoV2 PCR Negative   NEGATIVE: SARS-CoV-2  (COVID-19) RNA not detected, presumed negative.               Echocardiogram Complete    Result Date: 2022  [id number]^[email or web address]^[place] [address]  Name: EYAL ROONEY MRN: [id number] : 1995 Study Date: 2022 08:27 AM Age: 26 yrs Gender: Male Patient [facility] Reason For Study: SOB Ordering Physician: LAUREL ENRIQUEZ Performed By: DANII  BSA: 1.8 m2 Height: 66 in Weight: 148 lb HR: 110 BP: 136/65 mmHg ______________________________________________________________________________ Procedure Complete Portable Echo Adult. ______________________________________________________________________________ Interpretation Summary  Left ventricular size, wall motion and function are normal. The ejection fraction is 60-65%. Normal right ventricle size and systolic function. No hemodynamically significant valvular abnormalities on 2D or color flow imaging. ______________________________________________________________________________ Left Ventricle Left ventricular size, wall motion and function are normal. The ejection fraction is 60-65%. There is normal left ventricular wall thickness. Left ventricular diastolic function is normal. No regional wall motion abnormalities noted.  Right Ventricle Normal right ventricle size and systolic function.  Atria Normal left atrial size. Right atrial size is normal. There is no color Doppler evidence of an atrial shunt.  Mitral Valve Mitral valve leaflets appear normal. There is no evidence of mitral stenosis or clinically significant mitral regurgitation. There is trace mitral regurgitation.  Tricuspid Valve Tricuspid valve leaflets appear normal. Right ventricle systolic pressure estimate normal. There is trace tricuspid regurgitation.  Aortic Valve Aortic valve leaflets appear normal. There is no evidence of aortic stenosis or clinically significant aortic regurgitation.  Pulmonic Valve The  pulmonic valve is not well seen, but is grossly normal. This degree of valvular regurgitation is within normal limits.  Vessels The aorta root is normal. Normal size ascending aorta. IVC diameter >2.1 cm collapsing <50% with sniff suggests a high RA pressure estimated at 15 mmHg or greater.  Pericardium There is no pericardial effusion.  ______________________________________________________________________________ MMode/2D Measurements & Calculations IVSd: 0.95 cm LVIDd: 4.8 cm LVIDs: 2.7 cm LVPWd: 1.1 cm FS: 44.7 %  LV mass(C)d: 176.4 grams LV mass(C)dI: 100.2 grams/m2 Ao root diam: 2.1 cm asc Aorta Diam: 2.3 cm LVOT diam: 1.8 cm LVOT area: 2.5 cm2 LA Volume (BP): 65.4 ml RWT: 0.45  Doppler Measurements & Calculations MV E max luca: 178.0 cm/sec MV A max luca: 145.9 cm/sec MV E/A: 1.2 MV dec slope: 1103 cm/sec2 MV dec time: 0.16 sec Ao V2 max: 207.2 cm/sec Ao max P.0 mmHg Ao V2 mean: 145.5 cm/sec Ao mean P.8 mmHg Ao V2 VTI: 35.0 cm THOR(I,D): 2.4 cm2 THOR(V,D): 2.2 cm2 LV V1 max P.8 mmHg LV V1 max: 185.5 cm/sec LV V1 VTI: 33.7 cm SV(LVOT): 82.7 ml SI(LVOT): 47.0 ml/m2 PA V2 max: 167.0 cm/sec PA max P.1 mmHg PA acc time: 0.17 sec AV Luca Ratio (DI): 0.90 THOR Index (cm2/m2): 1.3  E/E' av.0 Lateral E/e': 11.6 Medial E/e': 14.3  ______________________________________________________________________________ Report approved by: Sofya Saucedo 2022 11:35 AM       US Lower Extremity Venous Duplex Bilateral    Result Date: 2022  EXAM: ULTRASOUND VENOUS BILATERAL LOWER EXTREMITIES WITH DOPPLER LOCATION: [facility] DATE/TIME: 2022 5:07 AM INDICATION: Tachycardia, leg swelling and shortness of breath. COMPARISON: None. TECHNIQUE: Venous Duplex ultrasound of bilateral lower extremities with and without compression, augmentation and duplex. Color flow and spectral Doppler with waveform analysis performed. FINDINGS: Exam includes the common femoral, femoral,  popliteal veins as well as segmentally visualized deep calf veins and greater saphenous vein. RIGHT: Normal compressibility of the common femoral, femoral, popliteal, posterior tibial, peroneal and great saphenous veins. Unremarkable Doppler waveform evaluation of the common femoral, femoral and popliteal veins. LEFT: Normal compressibility of the common femoral, femoral, popliteal, posterior tibial, peroneal and great saphenous veins. Unremarkable Doppler waveform evaluation of the common femoral, femoral and popliteal veins.     IMPRESSION: No evidence of thrombus in the major veins of bilateral lower extremities.         Abdomen US, limited (RUQ only)    Result Date: 4/7/2022  EXAM: US ABDOMEN LIMITED LOCATION: [facility] DATE/TIME: 4/6/2022 11:50 PM INDICATION: Elevated bili, looks like ascites, cirrhosis. COMPARISON: None. TECHNIQUE: Limited abdominal ultrasound. FINDINGS: GALLBLADDER: Gallbladder is distended with wall thickening and tumefactive sludge. BILE DUCTS: No biliary dilatation. The common duct measures 6 mm. LIVER: Hepatomegaly with coarsened hepatic echotexture. RIGHT KIDNEY: No hydronephrosis. PANCREAS: The visualized portions are normal. No significant ascites.     IMPRESSION: 1.  Hepatomegaly with coarsening of hepatic echotexture compatible with cirrhosis. 2.  Gallbladder distention with wall thickening. Wall thickening can be seen with hepatic parenchymal disease. Tumefactive sludge. No bile duct dilatation.     XR Chest Port 1 View    Result Date: 4/7/2022  EXAM: XR CHEST PORT 1 VIEW LOCATION: [facility] DATE/TIME: 4/7/2022 1:19 AM INDICATION: evaluate lung nodules, abnormal CT COMPARISON: 04/27/2013. CT abdomen pelvis from 04/07/2022.     IMPRESSION: Heart size within normal limits. Bilateral central and basilar airspace infiltrates with mild basilar interstitial prominence. Findings likely reflect a degree of interstitial edema, likely with  superimposed pneumonia or pneumonitis. Recommend follow-up chest radiographs to ensure resolution. No pneumothorax.    CT Abdomen Pelvis w Contrast    Result Date: 4/7/2022  EXAM: CT ABDOMEN PELVIS W CONTRAST LOCATION: [facility] DATE/TIME: 4/7/2022 12:37 AM INDICATION: Abdominal distension. COMPARISON: None. TECHNIQUE: CT scan of the abdomen and pelvis was performed following injection of IV contrast. Multiplanar reformats were obtained. Dose reduction techniques were used. CONTRAST: [id number] 80 ml. FINDINGS: LOWER CHEST: Multifocal rounded solid and groundglass nodular infiltrates with superimposed interstitial infiltrates in the visualized lungs. HEPATOBILIARY: Hepatomegaly. Cirrhotic appearance to the liver with trace perihepatic ascites. Gallbladder is distended with wall thickening and sludge. Upper abdominal varices compatible with portal hypertension. PANCREAS: Normal. SPLEEN: Splenomegaly. Splenorenal shunt compatible with portal hypertension. ADRENAL GLANDS: Normal. KIDNEYS/BLADDER: Normal. BOWEL: Normal. LYMPH NODES: Normal. VASCULATURE: Unremarkable. PELVIC ORGANS: Calcification vas deferens compatible with diabetes. Anasarca. MUSCULOSKELETAL: Normal.     IMPRESSION: 1.  Multifocal regions of solid, and groundglass nodularity in the visualized mid and lower lungs with superimposed background of mild interstitial infiltrates. 2.  Cirrhosis with splenomegaly and small volume ascites. Multiple varices compatible with portal hypertension. 3.  Gallbladder is distended with wall thickening which can be seen with parenchymal disease. Sludge within the gallbladder lumen. 4.  Anasarca. 5.  Calcification vas deferens compatible with diabetes.

## 2022-04-08 NOTE — PROGRESS NOTES
"CLINICAL NUTRITION SERVICES - ASSESSMENT NOTE     Nutrition Prescription    RECOMMENDATIONS FOR MDs/PROVIDERS TO ORDER:  None at this time    Malnutrition Status:    % Weight Loss:  Weight loss does not meet criteria for malnutrition   % Intake: Unable to accurately assess  Subcutaneous Fat Loss:  Unable to accurately assess  Muscle Loss:  Unable to accurately assess  Fluid Retention:  Moderate     Malnutrition Diagnosis: Unable to determine due to unable to patient unavailable on multiple attempts today    Recommendations already ordered by Registered Dietitian (RD):  Trial Glucerna 2x/day with meals    Future/Additional Recommendations:  Would benefit from diet education on liver health, diabetes at follow-up     REASON FOR ASSESSMENT  Dillon Salter is a/an 26 year old male assessed by the dietitian for Admission Nutrition Risk Screen for positive wt loss and poor PO intake.    Patient admitted for bleeding and alcohol detox with history of autism spectrum, anxiety, alcoholic cirrhosis of liver, T2DM, anemia, and HTN.    NUTRITION HISTORY  Patient was sleeping or with other providers on multiple visit attempts. Per chart review, patient lives with parents who are currently at bedside. Considering outpatient treatment for alcohol cessation.      CURRENT NUTRITION ORDERS  Diet: 2 g Sodium and Moderate Consistent Carbohydrate  Intake/Tolerance: 25%    LABS  Labs reviewed: Na 131, BUN 11, Cr 1.71, glucose 335, 346, 311, 269    MEDICATIONS  Medications reviewed: folic acid, furosemide, Novolog, lantus, MVI, thiamine    ANTHROPOMETRICS  Height: 167.6 cm (5' 6\")  Most Recent Weight: 71 kg (156 lb 9.6 oz)    IBW: 65 kg  BMI: Overweight BMI 25-29.9  Weight History:   Wt Readings from Last 15 Encounters:   04/07/22 71 kg (156 lb 9.6 oz)   03/21/22 66.2 kg (146 lb)   03/11/22 66.2 kg (146 lb)  02/02/21 86 kg (189 lb 9.6 oz)  Weight change: -33 lb (17%) in past year with limited data in chart    Dosing Weight: 65 " kg    ASSESSED NUTRITION NEEDS  Estimated Energy Needs: 7491-7981 kcals/day (28 - 30 kcals/kg )  Justification: Maintenance  Estimated Protein Needs: 78-91 grams protein/day (1.2 - 1.4 grams of pro/kg)  Justification: Increased needs due to cirrhosis  Estimated Fluid Needs: 5589-6462 mL/day (1 mL/kcal)   Justification: Maintenance and Per provider pending fluid status    PHYSICAL FINDINGS  See malnutrition section below.  Per flowsheet:  Edema: +1 generalized, +2-3 legs and feet  Skin: no breakdown noted  GI: last BM 4/6    MALNUTRITION:  % Weight Loss:  Weight loss does not meet criteria for malnutrition   % Intake: Unable to accurately assess  Subcutaneous Fat Loss:  Unable to accurately assess  Muscle Loss:  Unable to accurately assess  Fluid Retention:  Moderate     Malnutrition Diagnosis: Unable to determine due to unable to patient unavailable on multiple attempts today    NUTRITION DIAGNOSIS  Inadequate protein-energy intake related to reduced appetite and substitution of food with alcohol as evidenced by pt reports of reduced PO, wt loss of 17% in past 1 year, and reports of excessive alcohol intake.    INTERVENTIONS  Implementation  Nutrition Education: Unable to complete due to patient unavailable. Would benefit from education on nutrition for liver health and diabetes.  Medical food supplement therapy: trial Glucerna BID with meals  Continue daily MVI  Encourage good PO intake    Goals  Patient to consume % of nutritionally adequate meals three times per day, or the equivalent with supplements/snacks.     Monitoring/Evaluation  Progress toward goals will be monitored and evaluated per protocol.

## 2022-04-08 NOTE — PLAN OF CARE
Goal: Optimal Comfort and Wellbeing  Outcome: Ongoing, Progressing    Problem: Physiologic Impairment (Excessive Substance Use)  Goal: Improved Physiologic Symptoms (Excessive Substance Use)  Outcome: Ongoing, Progressing       Pt cooperative and denies any pain. Pt has flat affect but verbalizes needs. Pt still scoring CIWA. Scored x3 overnight. Pt resting will continue to monitor.

## 2022-04-08 NOTE — CONSULTS
Care Management Initial Consult    General Information  Assessment completed with: Patient,    Type of CM/SW Visit: Initial Assessment    Primary Care Provider verified and updated as needed: Yes   Readmission within the last 30 days: no previous admission in last 30 days      Reason for Consult: discharge planning  Advance Care Planning:            Communication Assessment  Patient's communication style: spoken language (English or Bilingual)    Hearing Difficulty or Deaf: no   Wear Glasses or Blind: yes    Cognitive  Cognitive/Neuro/Behavioral: .WDL except  Level of Consciousness: sedated  Arousal Level: arouses to voice, arouses to touch/gentle shaking  Orientation: oriented x 4  Mood/Behavior: flat affect  Best Language: 0 - No aphasia  Speech: slow    Living Environment:   People in home: parent(s)     Current living Arrangements: house      Able to return to prior arrangements: yes       Family/Social Support:  Care provided by: self  Provides care for: no one  Marital Status: Single  Parent(s)          Description of Support System: Supportive, Involved         Current Resources:   Patient receiving home care services: No     Community Resources: None  Equipment currently used at home: none  Supplies currently used at home: None    Employment/Financial:  Employment Status: employment seeking        Financial Concerns:     Referral to Financial Worker: No       Lifestyle & Psychosocial Needs:  Social Determinants of Health     Tobacco Use: Medium Risk     Smoking Tobacco Use: Former Smoker     Smokeless Tobacco Use: Unknown   Alcohol Use: Not on file   Financial Resource Strain: Not on file   Food Insecurity: Not on file   Transportation Needs: Not on file   Physical Activity: Not on file   Stress: Not on file   Social Connections: Not on file   Intimate Partner Violence: Not on file   Depression: Not on file   Housing Stability: Not on file       Functional Status:  Prior to admission patient needed  "assistance:   Dependent ADLs:: Independent  Dependent IADLs:: Independent       Mental Health Status:  Mental Health Status: Current Concern  Mental Health Management: Medication    Chemical Dependency Status:  Chemical Dependency Status: Current Concern  Chemical Dependency Management:  (setting up Rule 25)          Values/Beliefs:  Spiritual, Cultural Beliefs, Moravian Practices, Values that affect care: no  Description of Beliefs that Will Affect Care: Pt very anxious, wants help keeping calm            Additional Information:  SW introduced self and CM services to Pt.  Pt reports living in a private residence with both of his parents.  At baseline, Pt is independent at baseline with ALDs and IADLs.  Pt reports he is \"in between jobs\".  Mother transports Pt as needed.  Plan is for Pt to discharge home with family support upon discharge.  Addiction medicine met with Pt to address alcohol use and recommend Rule 25.  MOLLY discussed with Pt which Pt is agreeable to.  MOLLY called and left a voice mail for [email or web address] ([phone]) requesting call back to scheduled.   Family to transport at discharge.     ALEXANDER Hawkins        "

## 2022-04-08 NOTE — PLAN OF CARE
Problem: Plan of Care - These are the overarching goals to be used throughout the patient stay.    Goal: Absence of Hospital-Acquired Illness or Injury  Outcome: Ongoing, Progressing    Goal: Optimal Comfort and Wellbeing  Outcome: Ongoing, Progressing    Problem: Behavior Regulation Impairment (Excessive Substance Use)  Goal: Improved Behavioral Control (Excessive Substance Use)  Outcome: Ongoing, Progressing      Pt up to floor this afternoon with mom at bedside. VSS, remains on 2L nasal cannula. Pt A & O x 4, calling appropriately. Ativan given per CIWA protocol x 2. Bed alarm on and frequent rounding for pt safety; visitor exception obtained for mom to stay overnight. Per pt, last time pt drank was yesterday (4/6) around 10pm. HgB Q6, was 7.4 this evening. Consent for blood transfusion already obtained if needed. Will continue to monitor closely.

## 2022-04-08 NOTE — PROGRESS NOTES
Parents present at bedside. Family is Sikhism and acknowledge that prayer is a source of support. Pt was sleeping and did not wake. Parents have friends and family nearby. Father relays that they have not reached out much to them because they are waiting until they have more information to share.     provided pastoral care through conversation, validation of their stress and love in caring for their son, encouragement to pray and assurance that 'tho God sometimes seems invisible, he is present always. We closed with prayer.    Spiritual Care is available as needed or requested.    KARMA Leger.

## 2022-04-08 NOTE — PLAN OF CARE
"Goal Outcome Evaluation: Progressing, ongoing.   Problem: Plan of Care - These are the overarching goals to be used throughout the patient stay.    Goal: Plan of Care Review/Shift Note  Description: The Plan of Care Review/Shift note should be completed every shift.  The Outcome Evaluation is a brief statement about your assessment that the patient is improving, declining, or no change.  This information will be displayed automatically on your shift note.  Outcome: Ongoing, Progressing  Goal: Patient-Specific Goal (Individualized)  Description: You can add care plan individualizations to a care plan. Examples of Individualization might be:  \"Parent requests to be called daily at 9am for status\", \"I have a hard time hearing out of my right ear\", or \"Do not touch me to wake me up as it startles me\".  Outcome: Ongoing, Progressing  Goal: Absence of Hospital-Acquired Illness or Injury  Outcome: Ongoing, Progressing  Intervention: Identify and Manage Fall Risk  Recent Flowsheet Documentation  Taken 4/8/2022 1653 by Leni Dejesus RN  Safety Promotion/Fall Prevention:   bed alarm on   safety round/check completed   room door open  Taken 4/8/2022 1218 by Leni Dejesus, RN  Safety Promotion/Fall Prevention:   activity supervised   assistive device/personal items within reach   clutter free environment maintained   fall prevention program maintained   increased rounding and observation   increase visualization of patient   nonskid shoes/slippers when out of bed   patient and family education   room door open   room near nurse's station   room organization consistent   treat underlying cause   treat reversible contributory factors  Intervention: Prevent Skin Injury  Recent Flowsheet Documentation  Taken 4/8/2022 1653 by Leni Dejesus RN  Body Position: position changed independently  Taken 4/8/2022 1218 by Leni Dejesus RN  Body Position: position changed independently  Intervention: Prevent and Manage " VTE (Venous Thromboembolism) Risk  Recent Flowsheet Documentation  Taken 4/8/2022 1653 by Leni Dejesus, RN  Activity Management: up in chair  Taken 4/8/2022 1500 by Leni Dejesus RN  Activity Management: activity adjusted per tolerance  Taken 4/8/2022 1218 by Leni Dejesus RN  Activity Management: activity adjusted per tolerance  Goal: Optimal Comfort and Wellbeing  Outcome: Ongoing, Progressing  Intervention: Monitor Pain and Promote Comfort  Recent Flowsheet Documentation  Taken 4/8/2022 1251 by Leni Dejesus RN  Pain Management Interventions: emotional support  Goal: Readiness for Transition of Care  Outcome: Ongoing, Progressing         Pt has been intermittently drowsy throughout shift. Ativan given x2 for CIWA. CIWA recheck around 1920. Went down for a chest CT this shift. Has been up in the chair for a few hours this shift. Had small bowel movement and is voiding adequately. Pt has been sinus tach/sinus rhythm throughout this shift. MD aware of elevated BS.

## 2022-04-09 ENCOUNTER — APPOINTMENT (OUTPATIENT)
Dept: PHYSICAL THERAPY | Facility: CLINIC | Age: 27
DRG: 871 | End: 2022-04-09
Attending: INTERNAL MEDICINE
Payer: COMMERCIAL

## 2022-04-09 ENCOUNTER — APPOINTMENT (OUTPATIENT)
Dept: OCCUPATIONAL THERAPY | Facility: CLINIC | Age: 27
DRG: 871 | End: 2022-04-09
Attending: INTERNAL MEDICINE
Payer: COMMERCIAL

## 2022-04-09 LAB
ALBUMIN SERPL-MCNC: 3 G/DL (ref 3.5–5)
ALP SERPL-CCNC: 131 U/L (ref 45–120)
ALT SERPL W P-5'-P-CCNC: 89 U/L (ref 0–45)
ANION GAP SERPL CALCULATED.3IONS-SCNC: 12 MMOL/L (ref 5–18)
AST SERPL W P-5'-P-CCNC: 139 U/L (ref 0–40)
BILIRUB SERPL-MCNC: 13.9 MG/DL (ref 0–1)
BUN SERPL-MCNC: 12 MG/DL (ref 8–22)
C PNEUM DNA SPEC QL NAA+PROBE: NOT DETECTED
CALCIUM SERPL-MCNC: 9.3 MG/DL (ref 8.5–10.5)
CHLORIDE BLD-SCNC: 91 MMOL/L (ref 98–107)
CO2 SERPL-SCNC: 29 MMOL/L (ref 22–31)
CREAT SERPL-MCNC: 0.66 MG/DL (ref 0.7–1.3)
ERYTHROCYTE [DISTWIDTH] IN BLOOD BY AUTOMATED COUNT: 16.4 % (ref 10–15)
FLUAV H1 2009 PAND RNA SPEC QL NAA+PROBE: NOT DETECTED
FLUAV H1 RNA SPEC QL NAA+PROBE: NOT DETECTED
FLUAV H3 RNA SPEC QL NAA+PROBE: NOT DETECTED
FLUAV RNA SPEC QL NAA+PROBE: NOT DETECTED
FLUBV RNA SPEC QL NAA+PROBE: NOT DETECTED
GFR SERPL CREATININE-BSD FRML MDRD: >90 ML/MIN/1.73M2
GLUCOSE BLD-MCNC: 315 MG/DL (ref 70–125)
GLUCOSE BLDC GLUCOMTR-MCNC: 272 MG/DL (ref 70–99)
GLUCOSE BLDC GLUCOMTR-MCNC: 344 MG/DL (ref 70–99)
GLUCOSE BLDC GLUCOMTR-MCNC: 382 MG/DL (ref 70–99)
GLUCOSE BLDC GLUCOMTR-MCNC: 418 MG/DL (ref 70–99)
HADV DNA SPEC QL NAA+PROBE: NOT DETECTED
HCOV PNL SPEC NAA+PROBE: NOT DETECTED
HCT VFR BLD AUTO: 25 % (ref 40–53)
HGB BLD-MCNC: 8.1 G/DL (ref 13.3–17.7)
HMPV RNA SPEC QL NAA+PROBE: NOT DETECTED
HOLD SPECIMEN: NORMAL
HPIV1 RNA SPEC QL NAA+PROBE: NOT DETECTED
HPIV2 RNA SPEC QL NAA+PROBE: NOT DETECTED
HPIV3 RNA SPEC QL NAA+PROBE: NOT DETECTED
HPIV4 RNA SPEC QL NAA+PROBE: NOT DETECTED
INR PPP: 1.82 (ref 0.85–1.15)
M PNEUMO DNA SPEC QL NAA+PROBE: NOT DETECTED
MAGNESIUM SERPL-MCNC: 1.6 MG/DL (ref 1.8–2.6)
MCH RBC QN AUTO: 33.6 PG (ref 26.5–33)
MCHC RBC AUTO-ENTMCNC: 32.4 G/DL (ref 31.5–36.5)
MCV RBC AUTO: 104 FL (ref 78–100)
PHOSPHATE SERPL-MCNC: 3.1 MG/DL (ref 2.5–4.5)
PLATELET # BLD AUTO: 105 10E3/UL (ref 150–450)
POTASSIUM BLD-SCNC: 3.5 MMOL/L (ref 3.5–5)
PROT SERPL-MCNC: 9 G/DL (ref 6–8)
RBC # BLD AUTO: 2.41 10E6/UL (ref 4.4–5.9)
RSV RNA SPEC QL NAA+PROBE: NOT DETECTED
RSV RNA SPEC QL NAA+PROBE: NOT DETECTED
RV+EV RNA SPEC QL NAA+PROBE: NOT DETECTED
SODIUM SERPL-SCNC: 132 MMOL/L (ref 136–145)
WBC # BLD AUTO: 8.8 10E3/UL (ref 4–11)

## 2022-04-09 PROCEDURE — 250N000011 HC RX IP 250 OP 636: Performed by: INTERNAL MEDICINE

## 2022-04-09 PROCEDURE — 85610 PROTHROMBIN TIME: CPT | Performed by: PHYSICIAN ASSISTANT

## 2022-04-09 PROCEDURE — 97166 OT EVAL MOD COMPLEX 45 MIN: CPT | Mod: GO

## 2022-04-09 PROCEDURE — 250N000013 HC RX MED GY IP 250 OP 250 PS 637: Performed by: INTERNAL MEDICINE

## 2022-04-09 PROCEDURE — 86698 HISTOPLASMA ANTIBODY: CPT | Performed by: INTERNAL MEDICINE

## 2022-04-09 PROCEDURE — 97162 PT EVAL MOD COMPLEX 30 MIN: CPT | Mod: GP

## 2022-04-09 PROCEDURE — 83735 ASSAY OF MAGNESIUM: CPT | Performed by: INTERNAL MEDICINE

## 2022-04-09 PROCEDURE — 87449 NOS EACH ORGANISM AG IA: CPT | Performed by: INTERNAL MEDICINE

## 2022-04-09 PROCEDURE — 999N000157 HC STATISTIC RCP TIME EA 10 MIN

## 2022-04-09 PROCEDURE — 250N000013 HC RX MED GY IP 250 OP 250 PS 637: Performed by: NURSE PRACTITIONER

## 2022-04-09 PROCEDURE — 85027 COMPLETE CBC AUTOMATED: CPT | Performed by: INTERNAL MEDICINE

## 2022-04-09 PROCEDURE — 99232 SBSQ HOSP IP/OBS MODERATE 35: CPT | Performed by: STUDENT IN AN ORGANIZED HEALTH CARE EDUCATION/TRAINING PROGRAM

## 2022-04-09 PROCEDURE — 250N000009 HC RX 250: Performed by: INTERNAL MEDICINE

## 2022-04-09 PROCEDURE — 99233 SBSQ HOSP IP/OBS HIGH 50: CPT | Performed by: INTERNAL MEDICINE

## 2022-04-09 PROCEDURE — 87385 HISTOPLASMA CAPSUL AG IA: CPT | Performed by: INTERNAL MEDICINE

## 2022-04-09 PROCEDURE — 94799 UNLISTED PULMONARY SVC/PX: CPT

## 2022-04-09 PROCEDURE — 86612 BLASTOMYCES ANTIBODY: CPT | Performed by: INTERNAL MEDICINE

## 2022-04-09 PROCEDURE — 36415 COLL VENOUS BLD VENIPUNCTURE: CPT | Performed by: INTERNAL MEDICINE

## 2022-04-09 PROCEDURE — 87040 BLOOD CULTURE FOR BACTERIA: CPT | Performed by: INTERNAL MEDICINE

## 2022-04-09 PROCEDURE — 97535 SELF CARE MNGMENT TRAINING: CPT | Mod: GO

## 2022-04-09 PROCEDURE — 97116 GAIT TRAINING THERAPY: CPT | Mod: GP

## 2022-04-09 PROCEDURE — 84100 ASSAY OF PHOSPHORUS: CPT | Performed by: INTERNAL MEDICINE

## 2022-04-09 PROCEDURE — 120N000004 HC R&B MS OVERFLOW

## 2022-04-09 PROCEDURE — 80053 COMPREHEN METABOLIC PANEL: CPT | Performed by: PHYSICIAN ASSISTANT

## 2022-04-09 RX ORDER — PANTOPRAZOLE SODIUM 20 MG/1
40 TABLET, DELAYED RELEASE ORAL
Status: DISCONTINUED | OUTPATIENT
Start: 2022-04-09 | End: 2022-04-12 | Stop reason: HOSPADM

## 2022-04-09 RX ORDER — LACTULOSE 10 G/15ML
20 SOLUTION ORAL 3 TIMES DAILY
Status: DISCONTINUED | OUTPATIENT
Start: 2022-04-09 | End: 2022-04-12 | Stop reason: HOSPADM

## 2022-04-09 RX ADMIN — LACTULOSE 20 G: 10 SOLUTION ORAL at 22:22

## 2022-04-09 RX ADMIN — HYDROXYZINE HYDROCHLORIDE 25 MG: 25 TABLET ORAL at 06:19

## 2022-04-09 RX ADMIN — DOXYCYCLINE 100 MG: 100 INJECTION, POWDER, LYOPHILIZED, FOR SOLUTION INTRAVENOUS at 04:15

## 2022-04-09 RX ADMIN — CEFTAZIDIME 2 G: 2 INJECTION, POWDER, FOR SOLUTION INTRAVENOUS at 16:22

## 2022-04-09 RX ADMIN — GABAPENTIN 100 MG: 100 CAPSULE ORAL at 09:41

## 2022-04-09 RX ADMIN — GABAPENTIN 100 MG: 100 CAPSULE ORAL at 14:31

## 2022-04-09 RX ADMIN — GABAPENTIN 100 MG: 100 CAPSULE ORAL at 22:22

## 2022-04-09 RX ADMIN — CEFTAZIDIME 2 G: 2 INJECTION, POWDER, FOR SOLUTION INTRAVENOUS at 09:25

## 2022-04-09 RX ADMIN — FUROSEMIDE 20 MG: 10 INJECTION, SOLUTION INTRAVENOUS at 06:20

## 2022-04-09 RX ADMIN — LORAZEPAM 1 MG: 1 TABLET ORAL at 01:36

## 2022-04-09 RX ADMIN — LACTULOSE 20 G: 10 SOLUTION ORAL at 09:49

## 2022-04-09 RX ADMIN — LACTULOSE 20 G: 10 SOLUTION ORAL at 14:31

## 2022-04-09 RX ADMIN — LORAZEPAM 1 MG: 1 TABLET ORAL at 12:23

## 2022-04-09 RX ADMIN — DOXYCYCLINE 100 MG: 100 INJECTION, POWDER, LYOPHILIZED, FOR SOLUTION INTRAVENOUS at 17:09

## 2022-04-09 RX ADMIN — FOLIC ACID 1 MG: 1 TABLET ORAL at 09:40

## 2022-04-09 RX ADMIN — FUROSEMIDE 20 MG: 10 INJECTION, SOLUTION INTRAVENOUS at 22:48

## 2022-04-09 RX ADMIN — Medication 5 MG: at 09:52

## 2022-04-09 RX ADMIN — THERA TABS 1 TABLET: TAB at 09:41

## 2022-04-09 RX ADMIN — CEFTAZIDIME 2 G: 2 INJECTION, POWDER, FOR SOLUTION INTRAVENOUS at 22:52

## 2022-04-09 RX ADMIN — FUROSEMIDE 20 MG: 10 INJECTION, SOLUTION INTRAVENOUS at 14:31

## 2022-04-09 RX ADMIN — HYDROXYZINE HYDROCHLORIDE 25 MG: 25 TABLET ORAL at 22:22

## 2022-04-09 RX ADMIN — Medication 100 MG: at 09:41

## 2022-04-09 RX ADMIN — FLUOXETINE 10 MG: 10 CAPSULE ORAL at 22:47

## 2022-04-09 ASSESSMENT — ACTIVITIES OF DAILY LIVING (ADL)
ADLS_ACUITY_SCORE: 8
ADLS_ACUITY_SCORE: 8
ADLS_ACUITY_SCORE: 10
ADLS_ACUITY_SCORE: 8
ADLS_ACUITY_SCORE: 8
ADLS_ACUITY_SCORE: 10
ADLS_ACUITY_SCORE: 8
ADLS_ACUITY_SCORE: 10
ADLS_ACUITY_SCORE: 8
ADLS_ACUITY_SCORE: 10
ADLS_ACUITY_SCORE: 8

## 2022-04-09 NOTE — PROGRESS NOTES
[facility] Medicine PROGRESS NOTE      Identification/Summary:   Dillon Salter is a 26 year old male with past medical history of Autism/Asperger, DM2, anxiety, alcohol abuse x 8.5 years  who was admitted on 4/6/2022 for bleeding from abdominal wall, alcohol detoxification. He is severely ill with alcoholic hepatitis, coagulopathy, elevated lactic acid, anasarca etc. Started on lorazepam per CIWA, pressure dressing on abdominal wall bleeding site.  CT abdomen showed groundglass nodular opacity in the lower lobes.  He was seen by GI, addiction medicine, infectious diseases.  CT chest 4/8 revealed nodular opacities,  multifocal pneumonia vs other etiology for nodular opacities, bilateral small effusions.  He is on ceftazidime, Doxy. On 4 lit oxygen. Discharge in few days.     Assessment & Plan       Alcohol Use Disorder- Severe  ( 8.5 years of heavy use)   Alcohol Liver disease ( hepatitis) with portal hypertensive ascites, varices,anasarca ,Splenomegaly,  coagulopathy, hyponatremia, thrombocytopenia   - alcohol cessation counseling, addiction medicine consult, will need rehab post discharge   - MVI, thiamine  - lorazepam per CIWA  - gabapentin   - GI following.  Appreciate input.  On lactulose  S/p vitamin K for coagulopathy  Monitor CBC, LFTs     Basilar nodular lung opacities/multifocal pneumonia  Acute respiratory failure  Bilateral small pleural effusions/anasarca/volume overload   afebrile.  No leukocytosis  Infectious disease consult appreciated  On ceftazidime, doxycycline  Discussed with pulmonary curbside, no pulmonary consults available over the weekend, short staffed, blood cultures x2  If not improving clinically consult pulmonary on Monday  Echo with normal EF 60 to 65%  On IV Lasix for anasarca, effusions  Monitor BMP    Anemia and thrombocytopenia  - likely from chronic alcohol use   - acute drop in Hb may be related to abdominal wall bleeding, no s/s of GI bleed   - b12/folate/iron studies  "  - transfuse for Hb < 7      Lactic Acidosis   - likely liver clearanace issue  - improved      DM2  Hyperglycemia  Most recent A1c 7.4  Blood sugars are high  Significant hyperglycemia, has been off his medication for at least 1 year, was previously on insulin, has lost significant weight  Lantus 10 units every morning, increase to 14 units today  Add mealtime insulin  Sliding scale every 4 hours while n.p.o. then changed to 4 times daily AC at bedtime     Blood leakage from the abdominal wall  Scratch the abdominal wall, may have ruptured the dilated vessel, bleeding stopped with a pressure dressing, continue pressure dressing     History of hypertension and dyslipidemia  Currently not on any medications, continue same    Diet: Combination Diet Regular Diet; Moderate Consistent Carb (60 g CHO per Meal) Diet; 2 gm NA Diet  Snacks/Supplements Adult: Glucerna; With Meals  DVT Prophylaxis: SCD secondary thrombocytopenia, coagulopathy  Code Status: Full Code    Anticipated possible discharge in few days once clinical improvement milestones are met.    Interval History/Subjective:  Denies any shortness of breath, occasional cough, no chest pain.  No fevers chills.  No other nausea vomiting abdominal pain.     Physical Exam/Objective:  Vitals I/O   Vital signs:  Temp: 97.9  F (36.6  C) Temp src: Axillary BP: 127/67 Pulse: 104   Resp: 16 SpO2: 97 % O2 Device: Nasal cannula Oxygen Delivery: 4 LPM Height: 167.6 cm (5' 6\") Weight: 70.1 kg (154 lb 9.6 oz)  Estimated body mass index is 24.95 kg/m  as calculated from the following:    Height as of this encounter: 1.676 m (5' 6\").    Weight as of this encounter: 70.1 kg (154 lb 9.6 oz).       I/O last 3 completed shifts:  In: 1200 [P.O.:1200]  Out: 2200 [Urine:2200]     Body mass index is 24.95 kg/m .    General Appearance:  Alert, cooperative, no distress   Head:  Normocephalic, atraumatic   Eyes:  PERRL    Throat:  mucosa; moist   Neck: No JVD, thyromegaly   Lungs:    " Decreased breath sounds bilaterally, no rhonchi or wheezing respirations unlabored   Chest Wall:  No tenderness or deformity   Heart:  Regular rate and rhythm, S1, S2 normal,no murmur   Abdomen:   Soft, non tender,distended, hepatomegaly present bowel sounds present, no guarding or rigidity   Extremities: No edema, no joint swelling   Skin:  Skin is icteric generalized anasarca   Neurologic: Alert and oriented X 3, Moves all 4 extremities       Medications:   Personally Reviewed.    cefTAZidime  2 g Intravenous Q8H     doxycycline (VIBRAMYCIN) IV  100 mg Intravenous Q12H     FLUoxetine  10 mg Oral At Bedtime     folic acid  1 mg Oral Daily     furosemide  20 mg Intravenous Q8H     gabapentin  100 mg Oral TID     insulin aspart  1-7 Units Subcutaneous TID AC     insulin aspart  1-5 Units Subcutaneous At Bedtime     insulin glargine  14 Units Subcutaneous QAM AC     lactulose  20 g Oral TID     multivitamin, therapeutic  1 tablet Oral Daily     pantoprazole  40 mg Oral QAM AC     sodium chloride (PF)  3 mL Intracatheter Q8H     thiamine  100 mg Oral Daily       Data reviewed today: I personally reviewed all new medications, labs, imaging/diagnostics reports over the past 24 hours. Pertinent findings include    Labs:  Most Recent 3 CBC's:Recent Labs   Lab Test 04/09/22  0525 04/08/22  1746 04/08/22  1139 04/08/22  0254 04/07/22  1233 04/07/22  0624   WBC 8.8  --   --  8.1  --  8.2   HGB 8.1* 7.9* 7.5* 7.3*   < > 7.1*   *  --   --  104*  --  104*   *  --   --  91*  --  97*    < > = values in this interval not displayed.     Most Recent 3 BMP's:Recent Labs   Lab Test 04/09/22  0927 04/09/22  0525 04/08/22  2147 04/08/22  0906 04/08/22  0254 04/07/22  0749 04/07/22  0624   NA  --  132*  --   --  131*  --  132*   POTASSIUM  --  3.5  --   --  3.7  --  4.1   CHLORIDE  --  91*  --   --  90*  --  93*   CO2  --  29  --   --  28  --  25   BUN  --  12  --   --  11  --  6*   CR  --  0.66*  --   --  0.71  --  0.62*    ANIONGAP  --  12  --   --  13  --  14   SARTHAK  --  9.3  --   --  9.3  --  8.9   * 315* 369*   < > 335*   < > 279*    < > = values in this interval not displayed.     Most Recent 2 LFT's:Recent Labs   Lab Test 04/09/22  0525 04/08/22  0254   * 164*   ALT 89* 96*   ALKPHOS 131* 116   BILITOTAL 13.9* 12.5*     Most Recent 3 INR's:Recent Labs   Lab Test 04/09/22  0525 04/08/22  0254 04/07/22  1421   INR 1.82* 1.85* 1.86*     Most Recent 3 BNP's:No lab results found.  Most Recent Hemoglobin A1c:Recent Labs   Lab Test 04/07/22  0624   A1C 7.4*     Most Recent 6 glucoses:Recent Labs   Lab Test 04/09/22  0927 04/09/22  0525 04/08/22  2147 04/08/22  1836 04/08/22  1646 04/08/22  1212   * 315* 369* 357* 344* 301*       Imaging:   Recent Results (from the past 24 hour(s))   CT Chest w/o Contrast    Narrative    EXAM: CT CHEST W/O CONTRAST  LOCATION: [facility]  DATE/TIME: 4/8/2022 2:58 PM    INDICATION: Abnormal xray - interstitial infiltrates, follow-up exam  COMPARISON: Chest radiograph from 04/07/2022, CT chest, abdomen, and pelvis from 04/27/2013, CT abdomen and pelvis from 04/07/2022  TECHNIQUE: CT chest without IV contrast. Multiplanar reformats were obtained. Dose reduction techniques were used.  CONTRAST: None.    FINDINGS:   LUNGS AND PLEURA: Small bilateral effusions and associated atelectasis. Numerous scattered multifocal groundglass and consolidative nodular opacities. There are also areas of interlobular septal thickening. No pneumothorax.    MEDIASTINUM/AXILLAE: Heart size is normal. No lymphadenopathy. No thoracic aortic aneurysm.    CORONARY ARTERY CALCIFICATION: None.    UPPER ABDOMEN: Hepatic steatosis. Hepatomegaly and cirrhosis. Splenomegaly. Numerous upper abdominal varices. Ascites seen in the partially visualized upper abdomen.    MUSCULOSKELETAL: Diffuse anasarca.      Impression    IMPRESSION:   1.  Multifocal groundglass consolidative nodular opacities  suggestive of multifocal infection. Other considerations could include septic emboli in the appropriate clinical context given the nodular and somewhat peripheral distribution.  2.  Manifestations of third spacing including pulmonary edema, bilateral effusions, and anasarca.  3.  Partially visualized cirrhosis and portal hypertension.         Alvina Amaro MD  Hospitalist  [facility]

## 2022-04-09 NOTE — PLAN OF CARE
Goal Outcome Evaluation:    Plan of Care Reviewed With: patient   Patient's mother, Leticia has been here the entire shift. Patient is alert nd oriented, but very anxious. CIWA Score ave been 2 and 9 and patient received Ativan 1 mg IV. Patient ambulated 25 feet with Physical Therapy. Patient is receiving Lactulose and has had some mucus  ype stools. Blood sugars were 272 and 382. Skin is still dry and jaundice in color. Will continue to monitor.

## 2022-04-09 NOTE — PROGRESS NOTES
"   04/09/22 1400   Quick Adds   Type of Visit Initial Occupational Therapy Evaluation   Living Environment   People in Home parent(s)   Current Living Arrangements house   Living Environment Comments Split level home, standard toilet w/vanity nearby, walk in shower   Self-Care   Usual Activity Tolerance good   Current Activity Tolerance fair   Instrumental Activities of Daily Living (IADL)   IADL Comments Pt reports I w/IADLs at baseline, his mom expresses that he has Aspergers and is quite independent    General Information   Onset of Illness/Injury or Date of Surgery 04/06/22   Referring Physician Sondra   Patient/Family Therapy Goal Statement (OT) \"I want to walk\"   Cognitive Status Examination   Follows Commands WFL  (needed cues for walker safety)   Cognitive Status Comments Pt advocating for himself and expressing his needs, at times through his mom but responding appropriately to OT too.    Visual Perception   Visual Impairment/Limitations   (wears glasses all time)   Pain Assessment   Patient Currently in Pain No   Strength Comprehensive (MMT)   Comment, General Manual Muscle Testing (MMT) Assessment Generalized weakness   Coordination   Upper Extremity Coordination No deficits were identified   Transfers   Transfer Comments CGA for bed, chair and toilet transfer   Balance   Balance Comments some unsteadiness w/mobility   Activities of Daily Living   BADL Assessment/Intervention lower body dressing   Lower Body Dressing Assessment/Training   Comment, (Lower Body Dressing) Mod I w/socks   Clinical Impression   Criteria for Skilled Therapeutic Interventions Met (OT) Yes, treatment indicated   OT Diagnosis Decreased ADL independence   OT Problem List-Impairments impacting ADL activity tolerance impaired;balance;strength   Assessment of Occupational Performance 1-3 Performance Deficits   Planned Therapy Interventions (OT) ADL retraining   Clinical Decision Making Complexity (OT) moderate complexity   Risk & " Benefits of therapy have been explained evaluation/treatment results reviewed   OT Discharge Planning   OT Discharge Recommendation (DC Rec) home with assist   OT Rationale for DC Rec Pending progress, pt below baseline w/ADLs and activity tolerance, needing supervision and set up for ADLs. Will further assess cognition   Total Evaluation Time (Minutes)   Total Evaluation Time (Minutes) 15   OT Goals   Therapy Frequency (OT) Daily   OT Predicated Duration/Target Date for Goal Attainment 04/15/22   OT Goals Bed Mobility;Toilet Transfer/Toileting;Cognition   OT: Bed Mobility Modified independent   OT: Toilet Transfer/Toileting Modified independent   OT: Cognitive Patient/caregiver will verbalize understanding of cognitive assessment results/recommendations as needed for safe discharge planning

## 2022-04-09 NOTE — PROGRESS NOTES
[facility]    Infectious Disease Progress Note    Date of Service (when I saw the patient): 04/09/2022     Assessment & Plan   Dillon Salter is a 26 year old male who was admitted on 4/6/2022.   1. Alcoholic liver disease, intoxication  2. Type 2 diabetes, hypertension  3. Cirrhosis jaundice portal hypertension anasarca splenomegaly coagulopathy thrombocytopenia  4. Bleeding from abdominal wound, pressure dressing in place  5. Gallbladder distention thickening  6. On alcohol withdrawal protocol  7. Pulmonary infiltrates   1.  Multifocal groundglass consolidative nodular opacities suggestive of multifocal infection. Other considerations could include septic emboli in the appropriate clinical context given the nodular and somewhat peripheral distribution. 2.  Manifestations of third spacing including pulmonary edema, bilateral effusions, and anasarca. 3.  Partially visualized cirrhosis and portal hypertension.    Possible pneumonia in a patient with alcoholic liver disease on IV ceftazidime and doxycycline.  Blood cultures from 4/6/2022 so far no growth.  Cough is nonproductive.    Recommendations    1. Follow blood cultures  2. Empiric ceftazidime and doxycycline  3. Consider Pulmonary input- primary team to coordinate  4. Resp viral panel, sputum culture  5. De-escalate/focus antibiotics in 48 hours depending on culture results and clinical response  6. GI following  7. Discussed with patient's parents at bedside    Continue current antibiotics.  Sputum cultures if cough becomes productive.      Urmila Tyler MD  [facility]  [phone]        Interval History   New to me today.  Overall feels a little better.  Cough is nonproductive.    Physical Exam   Temp: 99.5  F (37.5  C) Temp src: Oral BP: 131/67 Pulse: 101   Resp: 22 SpO2: 97 % O2 Device: Nasal cannula Oxygen Delivery: 4 LPM  Vitals:    04/06/22 2237 04/07/22 1624 04/09/22 0520   Weight: 67.1  kg (148 lb) 71 kg (156 lb 9.6 oz) 70.1 kg (154 lb 9.6 oz)     Vital Signs with Ranges  Temp:  [97.9  F (36.6  C)-99.5  F (37.5  C)] 99.5  F (37.5  C)  Pulse:  [] 101  Resp:  [16-22] 22  BP: (109-151)/(60-89) 131/67  SpO2:  [93 %-99 %] 97 %    Constitutional: Somnolent  Lungs: Supplemental oxygen, distant  Cardiovascular: S1 S2  Abdomen: firm, ascites  Skin: dressing on Left lateral abdominal wall  Neuro: somnolent    Medications       cefTAZidime  2 g Intravenous Q8H     doxycycline (VIBRAMYCIN) IV  100 mg Intravenous Q12H     FLUoxetine  10 mg Oral At Bedtime     folic acid  1 mg Oral Daily     furosemide  20 mg Intravenous Q8H     gabapentin  100 mg Oral TID     [START ON 4/10/2022] insulin aspart   Subcutaneous Daily with breakfast     insulin aspart   Subcutaneous Daily with lunch     insulin aspart   Subcutaneous Daily with supper     insulin aspart  1-7 Units Subcutaneous TID AC     insulin aspart  1-5 Units Subcutaneous At Bedtime     insulin glargine  14 Units Subcutaneous QAM AC     lactulose  20 g Oral TID     multivitamin, therapeutic  1 tablet Oral Daily     pantoprazole  40 mg Oral QAM AC     sodium chloride (PF)  3 mL Intracatheter Q8H     thiamine  100 mg Oral Daily       Data   All microbiology laboratory data reviewed.  Recent Labs   Lab Test 04/09/22  0525 04/08/22  1746 04/08/22  1139 04/08/22  0254 04/07/22  1233 04/07/22  0624   WBC 8.8  --   --  8.1  --  8.2   HGB 8.1* 7.9* 7.5* 7.3*   < > 7.1*   HCT 25.0*  --   --  22.5*  --  21.9*   *  --   --  104*  --  104*   *  --   --  91*  --  97*    < > = values in this interval not displayed.     Recent Labs   Lab Test 04/09/22  0525 04/08/22  0254 04/07/22  0624   CR 0.66* 0.71 0.62*     RADIOLOGY:  04/06/2022 2334 04/08/2022 0713 Blood Culture Peripheral Blood [[id number]]    Peripheral Blood    Preliminary result Component Value   No component results              04/06/2022 2334 04/08/2022 0932 Blood Culture Peripheral Blood  [[id number]]   Peripheral Blood    Preliminary result Component Value   Culture No growth after 1 day P              2022 2259 2022 2354 Asymptomatic COVID-19 Virus (Coronavirus) by PCR Nasopharyngeal [[id number]]    Swab from Nasopharyngeal    Final result Component Value   SARS CoV2 PCR Negative   NEGATIVE: SARS-CoV-2 (COVID-19) RNA not detected, presumed negative.               Echocardiogram Complete    Result Date: 2022  [id number] [address]  Name: EYAL ROONEY MRN: [id number] : 1995 Study Date: 2022 08:27 AM Age: 26 yrs Gender: Male Patient Location: [facility] Reason For Study: SOB Ordering Physician: LAUREL ENRIQUEZ Performed By: [facility]  BSA: 1.8 m2 Height: 66 in Weight: 148 lb HR: 110 BP: 136/65 mmHg ______________________________________________________________________________ Procedure Complete Portable Echo Adult. ______________________________________________________________________________ Interpretation Summary  Left ventricular size, wall motion and function are normal. The ejection fraction is 60-65%. Normal right ventricle size and systolic function. No hemodynamically significant valvular abnormalities on 2D or color flow imaging. ______________________________________________________________________________ Left Ventricle Left ventricular size, wall motion and function are normal. The ejection fraction is 60-65%. There is normal left ventricular wall thickness. Left ventricular diastolic function is normal. No regional wall motion abnormalities noted.  Right Ventricle Normal right ventricle size and systolic function.  Atria Normal left atrial size. Right atrial size is normal. There is no color Doppler evidence of an atrial shunt.  Mitral Valve Mitral valve leaflets appear normal. There is no evidence of mitral stenosis or clinically significant mitral regurgitation. There  is trace mitral regurgitation.  Tricuspid Valve Tricuspid valve leaflets appear normal. Right ventricle systolic pressure estimate normal. There is trace tricuspid regurgitation.  Aortic Valve Aortic valve leaflets appear normal. There is no evidence of aortic stenosis or clinically significant aortic regurgitation.  Pulmonic Valve The pulmonic valve is not well seen, but is grossly normal. This degree of valvular regurgitation is within normal limits.  Vessels The aorta root is normal. Normal size ascending aorta. IVC diameter >2.1 cm collapsing <50% with sniff suggests a high RA pressure estimated at 15 mmHg or greater.  Pericardium There is no pericardial effusion.  ______________________________________________________________________________ MMode/2D Measurements & Calculations IVSd: 0.95 cm LVIDd: 4.8 cm LVIDs: 2.7 cm LVPWd: 1.1 cm FS: 44.7 %  LV mass(C)d: 176.4 grams LV mass(C)dI: 100.2 grams/m2 Ao root diam: 2.1 cm asc Aorta Diam: 2.3 cm LVOT diam: 1.8 cm LVOT area: 2.5 cm2 LA Volume (BP): 65.4 ml RWT: 0.45  Doppler Measurements & Calculations MV E max luca: 178.0 cm/sec MV A max luca: 145.9 cm/sec MV E/A: 1.2 MV dec slope: 1103 cm/sec2 MV dec time: 0.16 sec Ao V2 max: 207.2 cm/sec Ao max P.0 mmHg Ao V2 mean: 145.5 cm/sec Ao mean P.8 mmHg Ao V2 VTI: 35.0 cm THOR(I,D): 2.4 cm2 THOR(V,D): 2.2 cm2 LV V1 max P.8 mmHg LV V1 max: 185.5 cm/sec LV V1 VTI: 33.7 cm SV(LVOT): 82.7 ml SI(LVOT): 47.0 ml/m2 PA V2 max: 167.0 cm/sec PA max P.1 mmHg PA acc time: 0.17 sec AV Luca Ratio (DI): 0.90 THOR Index (cm2/m2): 1.3  E/E' av.0 Lateral E/e': 11.6 Medial E/e': 14.3  ______________________________________________________________________________ Report approved by: Sofya Saucedo 2022 11:35 AM       US Lower Extremity Venous Duplex Bilateral    Result Date: 2022  EXAM: ULTRASOUND VENOUS BILATERAL LOWER EXTREMITIES WITH DOPPLER LOCATION: [facility] DATE/TIME:  4/7/2022 5:07 AM INDICATION: Tachycardia, leg swelling and shortness of breath. COMPARISON: None. TECHNIQUE: Venous Duplex ultrasound of bilateral lower extremities with and without compression, augmentation and duplex. Color flow and spectral Doppler with waveform analysis performed. FINDINGS: Exam includes the common femoral, femoral, popliteal veins as well as segmentally visualized deep calf veins and greater saphenous vein. RIGHT: Normal compressibility of the common femoral, femoral, popliteal, posterior tibial, peroneal and great saphenous veins. Unremarkable Doppler waveform evaluation of the common femoral, femoral and popliteal veins. LEFT: Normal compressibility of the common femoral, femoral, popliteal, posterior tibial, peroneal and great saphenous veins. Unremarkable Doppler waveform evaluation of the common femoral, femoral and popliteal veins.     IMPRESSION: No evidence of thrombus in the major veins of bilateral lower extremities.         Abdomen US, limited (RUQ only)    Result Date: 4/7/2022  EXAM: US ABDOMEN LIMITED LOCATION: [facility] DATE/TIME: 4/6/2022 11:50 PM INDICATION: Elevated bili, looks like ascites, cirrhosis. COMPARISON: None. TECHNIQUE: Limited abdominal ultrasound. FINDINGS: GALLBLADDER: Gallbladder is distended with wall thickening and tumefactive sludge. BILE DUCTS: No biliary dilatation. The common duct measures 6 mm. LIVER: Hepatomegaly with coarsened hepatic echotexture. RIGHT KIDNEY: No hydronephrosis. PANCREAS: The visualized portions are normal. No significant ascites.     IMPRESSION: 1.  Hepatomegaly with coarsening of hepatic echotexture compatible with cirrhosis. 2.  Gallbladder distention with wall thickening. Wall thickening can be seen with hepatic parenchymal disease. Tumefactive sludge. No bile duct dilatation.     XR Chest Port 1 View    Result Date: 4/7/2022  EXAM: XR CHEST PORT 1 VIEW LOCATION: [facility]  DATE/TIME: 4/7/2022 1:19 AM INDICATION: evaluate lung nodules, abnormal CT COMPARISON: 04/27/2013. CT abdomen pelvis from 04/07/2022.     IMPRESSION: Heart size within normal limits. Bilateral central and basilar airspace infiltrates with mild basilar interstitial prominence. Findings likely reflect a degree of interstitial edema, likely with superimposed pneumonia or pneumonitis. Recommend follow-up chest radiographs to ensure resolution. No pneumothorax.    CT Abdomen Pelvis w Contrast    Result Date: 4/7/2022  EXAM: CT ABDOMEN PELVIS W CONTRAST LOCATION: [facility] DATE/TIME: 4/7/2022 12:37 AM INDICATION: Abdominal distension. COMPARISON: None. TECHNIQUE: CT scan of the abdomen and pelvis was performed following injection of IV contrast. Multiplanar reformats were obtained. Dose reduction techniques were used. CONTRAST: [id number] 80 ml. FINDINGS: LOWER CHEST: Multifocal rounded solid and groundglass nodular infiltrates with superimposed interstitial infiltrates in the visualized lungs. HEPATOBILIARY: Hepatomegaly. Cirrhotic appearance to the liver with trace perihepatic ascites. Gallbladder is distended with wall thickening and sludge. Upper abdominal varices compatible with portal hypertension. PANCREAS: Normal. SPLEEN: Splenomegaly. Splenorenal shunt compatible with portal hypertension. ADRENAL GLANDS: Normal. KIDNEYS/BLADDER: Normal. BOWEL: Normal. LYMPH NODES: Normal. VASCULATURE: Unremarkable. PELVIC ORGANS: Calcification vas deferens compatible with diabetes. Anasarca. MUSCULOSKELETAL: Normal.     IMPRESSION: 1.  Multifocal regions of solid, and groundglass nodularity in the visualized mid and lower lungs with superimposed background of mild interstitial infiltrates. 2.  Cirrhosis with splenomegaly and small volume ascites. Multiple varices compatible with portal hypertension. 3.  Gallbladder is distended with wall thickening which can be seen with parenchymal disease. Sludge  within the gallbladder lumen. 4.  Anasarca. 5.  Calcification vas deferens compatible with diabetes.

## 2022-04-09 NOTE — PLAN OF CARE
Problem: Plan of Care - These are the overarching goals to be used throughout the patient stay.    Goal: Absence of Hospital-Acquired Illness or Injury  Intervention: Prevent Infection  Recent Flowsheet Documentation  Taken 4/9/2022 0422 by Urmila Lock RN  Infection Prevention:    environmental surveillance performed    equipment surfaces disinfected    hand hygiene promoted    personal protective equipment utilized    rest/sleep promoted    single patient room provided    visitors restricted/screened  Taken 4/9/2022 0111 by Urmila Lock RN  Infection Prevention:    environmental surveillance performed    equipment surfaces disinfected    hand hygiene promoted    personal protective equipment utilized    rest/sleep promoted    single patient room provided    visitors restricted/screened  Taken 4/8/2022 2145 by Urmila Lock RN  Infection Prevention:    environmental surveillance performed    equipment surfaces disinfected    hand hygiene promoted    personal protective equipment utilized    rest/sleep promoted    single patient room provided    visitors restricted/screened  Pt A&Ox3-4, disoriented to time intermittently. CIWA scores between 2 and 9 this shift, PRN ativan given x1 this shift, PRN Atarax given x2 for anxiety. Pt reported mild headache this shift, but this was resolved with PRN ativan. Tele NSR-ST in the high 90's to low 100's this shift. IV abx administered per MAR. O2 increased to 4LNC, LSdim. Call light within reach, no other acute events this shift.    Urmila Lock RN  [phone]

## 2022-04-09 NOTE — PROGRESS NOTES
04/09/22 1526   Quick Adds   Type of Visit Initial PT Evaluation   Living Environment   People in Home parent(s)   Current Living Arrangements house   Home Accessibility stairs to enter home;stairs within home   Number of Stairs, Main Entrance 4   Stair Railings, Main Entrance none   Number of Stairs, Within Home, Primary eight   Stair Railings, Within Home, Primary railings safe and in good condition   Transportation Anticipated family or friend will provide   Self-Care   Usual Activity Tolerance good   Current Activity Tolerance moderate   Equipment Currently Used at Home none   Fall history within last six months no   General Information   Onset of Illness/Injury or Date of Surgery 04/06/22   Patient/Family Therapy Goals Statement (PT) none stated   Pertinent History of Current Problem (include personal factors and/or comorbidities that impact the POC) ETOH withdrawal   Existing Precautions/Restrictions no known precautions/restrictions   Cognition   Affect/Mental Status (Cognition) WFL   Posture    Posture Not impaired   Range of Motion (ROM)   Range of Motion ROM is WFL   Strength Comprehensive (MMT)   Comment, General Manual Muscle Testing (MMT) Assessment generalized weakness   Transfers   Transfers sit-stand transfer   Sit-Stand Transfer   Sit-Stand [place] (Transfers) supervision   Assistive Device (Sit-Stand Transfers) walker, front-wheeled   Comment, (Sit-Stand Transfer) slight instability 1x with initail standing   Gait/Stairs (Locomotion)   [place] Level (Gait) supervision   Assistive Device (Gait) walker, front-wheeled   Distance in Feet (Required for LE Total Joints) 150 x3, 200   Pattern (Gait) step-through   Deviations/Abnormal Patterns (Gait) gait speed decreased;jazmyn decreased   Balance   Balance Comments decreased balance due to strength   Coordination   Coordination no deficits were identified   Muscle Tone   Muscle Tone no deficits were identified   Clinical Impression    Preventive Health Recommendations  Female Ages 65 +    Yearly exam:     See your health care provider every year in order to  o Review health changes.   o Discuss preventive care.    o Review your medicines if your doctor has prescribed any.      You no longer need a yearly Pap test unless you've had an abnormal Pap test in the past 10 years. If you have vaginal symptoms, such as bleeding or discharge, be sure to talk with your provider about a Pap test.      Every 1 to 2 years, have a mammogram.  If you are over 69, talk with your health care provider about whether or not you want to continue having screening mammograms.      Every 10 years, have a colonoscopy. Or, have a yearly FIT test (stool test). These exams will check for colon cancer.       Have a cholesterol test every 5 years, or more often if your doctor advises it.       Have a diabetes test (fasting glucose) every three years. If you are at risk for diabetes, you should have this test more often.       At age 65, have a bone density scan (DEXA) to check for osteoporosis (brittle bone disease).    Shots:    Get a flu shot each year.    Get a tetanus shot every 10 years.    Talk to your doctor about your pneumonia vaccines. There are now two you should receive - Pneumovax (PPSV 23) and Prevnar (PCV 13).    Talk to your doctor about the shingles vaccine.    Talk to your doctor about the hepatitis B vaccine.    Nutrition:     Eat at least 5 servings of fruits and vegetables each day.      Eat whole-grain bread, whole-wheat pasta and brown rice instead of white grains and rice.      Talk to your provider about Calcium and Vitamin D.     Lifestyle    Exercise at least 150 minutes a week (30 minutes a day, 5 days a week). This will help you control your weight and prevent disease.      Limit alcohol to one drink per day.      No smoking.       Wear sunscreen to prevent skin cancer.       See your dentist twice a year for an exam and cleaning.      See your    Criteria for Skilled Therapeutic Intervention Yes, treatment indicated   PT Diagnosis (PT) impaired functional mobility    Influenced by the following impairments weakness, pain   Functional limitations due to impairments transfers, gait   Clinical Presentation (PT Evaluation Complexity) Evolving/Changing   Clinical Presentation Rationale Pt. presents as medically diagnosed   Clinical Decision Making (Complexity) low complexity   Planned Therapy Interventions (PT) gait training;stair training;strengthening;transfer training   Anticipated Equipment Needs at Discharge (PT) walker, rolling   Risk & Benefits of therapy have been explained care plan/treatment goals reviewed;patient   PT Discharge Planning   PT Discharge Recommendation (DC Rec) home with assist   PT Rationale for DC Rec Paient mobilizing well and has good family support at home.   Plan of Care Review   Plan of Care Reviewed With patient   Total Evaluation Time   Total Evaluation Time (Minutes) 15   Physical Therapy Goals   PT Frequency Daily   PT Predicated Duration/Target Date for Goal Attainment 04/14/22   PT Goals Transfers;Gait;Stairs;PT Goal 1   PT: Transfers Modified independent;Sit to/from stand   PT: Gait Rolling walker;150 feet;Modified independent   PT: Stairs 8 stairs;Supervision/stand-by assist;Rail on right   PT: Goal 1 I with HEP      eye doctor every 1 to 2 years to screen for conditions such as glaucoma, macular degeneration, cataracts, etc     Medications refilled     Set up allergy and nutrition     See me back in 1 month

## 2022-04-09 NOTE — PROGRESS NOTES
"GASTROENTEROLOGY PROGRESS NOTE     SUBJECTIVE: continues to be drowsy. Mother is in the room. On oxygen 4 L for sats of 97%. Chest imaging consistent with pneumonia and pulmonary edema. ID following and testing for histoplasmosis, blastomyces. He is on Fortaz and doxycycline. Abdominal imaging consistent with a small amount of ascites. He is receiving lasix 20 mg IV every 8 hours.     Liver function testing, CBC, INR stable. Liver serological work up with pending autoimmune markers and copper level.     He is on lactulose 10 g three times daily ( increased yesterday afternoon from 2 times daily)     OBJECTIVE:   /67 (BP Location: Left arm)   Pulse 104   Temp 97.9  F (36.6  C) (Axillary)   Resp 16   Ht 1.676 m (5' 6\")   Wt 70.1 kg (154 lb 9.6 oz)   SpO2 97%   BMI 24.95 kg/m     Temp (24hrs), Av.6  F (37  C), Min:97.9  F (36.6  C), Max:99.4  F (37.4  C)     Patient Vitals for the past 72 hrs:   Weight   22 0520 70.1 kg (154 lb 9.6 oz)   22 1624 71 kg (156 lb 9.6 oz)   22 2237 67.1 kg (148 lb)        Intake/Output Summary (Last 24 hours) at 2022 0928  Last data filed at 2022 2145  Gross per 24 hour   Intake 1200 ml   Output 1600 ml   Net -400 ml      PHYSICAL EXAM   Constitutional: Healthy, in bed, no acute distress  Cardiovascular: Regular rate and rhythm.   Respiratory: Clear to auscultation bilaterally, respirations non labored.   Abdomen: Soft, non-tender, non-distended, normally active bowel sounds. No masses or hepatosplenomegaly appreciated. No guarding or rebound tenderness.    I have reviewed the patient's new clinical lab results:   Recent Labs   Lab Test 22  0525 22  1746 22  1139 22  0254 22  1758 22  1421 22  1233 22  0624   WBC 8.8  --   --  8.1  --   --   --  8.2   HGB 8.1* 7.9* 7.5* 7.3*   < >  --    < > 7.1*   *  --   --  104*  --   --   --  104*   *  --   --  91*  --   --   --  97*   INR 1.82*  --  "  --  1.85*  --  1.86*  --  1.83*    < > = values in this interval not displayed.      Recent Labs   Lab Test 04/09/22  0525 04/08/22  0254 04/07/22  0624   * 131* 132*   POTASSIUM 3.5 3.7 4.1   CHLORIDE 91* 90* 93*   CO2 29 28 25   BUN 12 11 6*   CR 0.66* 0.71 0.62*   ANIONGAP 12 13 14   SARTHAK 9.3 9.3 8.9      Recent Labs   Lab Test 04/09/22  0525 04/08/22  0254 04/07/22  0624 04/06/22  2307   ALBUMIN 3.0* 3.0* 3.1* 3.7   BILITOTAL 13.9* 12.5* 10.3* 11.7*   ALT 89* 96* 110* 131*   * 164* 199* 237*   ALKPHOS 131* 116 128* 158*   LIPASE  --   --   --  45    acute hepatitis panel negative, iron panel normal, ferritin elevated.   CT of chest without conrast on 4/8/2022  IMPRESSION:   1.  Multifocal groundglass consolidative nodular opacities suggestive of multifocal infection. Other considerations could include septic emboli in the appropriate clinical context given the nodular and somewhat peripheral distribution.  2.  Manifestations of third spacing including pulmonary edema, bilateral effusions, and anasarca.  3.  Partially visualized cirrhosis and portal hypertension.       Echocardiogram Complete   Result Date: 4/7/2022  ______________________________________ Procedure Complete Portable Echo Adult. ______________________________________________________________________________ Interpretation Summary  Left ventricular size, wall motion and function are normal. The ejection fraction is 60-65%. Normal right ventricle size and systolic function. No hemodynamically significant valvular abnormalities on 2D or color flow imaging.      US Lower Extremity Venous Duplex Bilateral   Result Date: 4/7/2022  EXAM: ULTRASOUND VENOUS BILATERAL LOWER EXTREMITIES WITH DOPPLER    IMPRESSION: No evidence of thrombus in the major veins of bilateral lower extremities.            Result Date: 4/7/2022  EXAM: US ABDOMEN LIMITED LOCATION: [facility] DATE/TIME: 4/6/2022 11:50 PM   IMPRESSION: 1.   Hepatomegaly with coarsening of hepatic echotexture compatible with cirrhosis. 2.  Gallbladder distention with wall thickening. Wall thickening can be seen with hepatic parenchymal disease. Tumefactive sludge. No bile duct dilatation.      XR Chest Port 1 View     Result Date: 4/7/2022  EXAM: XR CHEST PORT 1 VIEW LOCATION: [facility] DATE/TIME: 4/7/2022 1:19 AM INDICATION: evaluate lung nodules, abnormal CT COMPARISON: 04/27/2013. CT abdomen pelvis from 04/07/2022.      IMPRESSION: Heart size within normal limits. Bilateral central and basilar airspace infiltrates with mild basilar interstitial prominence. Findings likely reflect a degree of interstitial edema, likely with superimposed pneumonia or pneumonitis. Recommend follow-up chest radiographs to ensure resolution. No pneumothorax.     CT Abdomen Pelvis w Contrast   Result Date: 4/7/2022  EXAM: CT ABDOMEN PELVIS W CONTRAST    IMPRESSION: 1.  Multifocal regions of solid, and groundglass nodularity in the visualized mid and lower lungs with superimposed background of mild interstitial infiltrates. 2.  Cirrhosis with splenomegaly and small volume ascites. Multiple varices compatible with portal hypertension. 3.  Gallbladder is distended with wall thickening which can be seen with parenchymal disease. Sludge within the gallbladder lumen. 4.  Anasarca. 5.  Calcification vas deferens compatible with diabetes.     Assessment:   1.  Cirrhosis, alcoholic.  Newly diagnosed.  MELD-Na 25 on admission.  A.  Encephalopathy -has had change in sleep-wake cycle and confusion prior to admission.  started lactulose on admission, mentation has cleared no asterixis.  B.  Coagulopathy -INR 1.72 and admit; 1.86 now  C.  Ascites-small volume per imaging.  Looks to be accumulating more now. Exam not concerning for spontaneous bacterial peritonitis. Abdomen is soft.   D.  Thrombocytopenia  E.  Hyperbilirubinemia  Will need outpatient follow-up with [facility]pat[facility].  Check additional serologies to assess for other underlying causes of liver disease -- Hep B and C neg.  Serum iron normal; ferritin mildly up but likely due to acute phase reactant and EtOH.  Ceruloplasmin, DIXIE, F-Actin still pending.   2.  Anemia.  No evidence of overt GI bleeding.  Suspect this is likely bone marrow suppression from alcoholism.  Drop in hemoglobin after admit likely related to hemodilution after receiving total of 2 L fluid bolus. Hgb stable since.  Patient will need EGD, as CT scan does show varices in upper abdomen.  If he remains without evidence of upper GI bleed, anticipate this will be done as an outpatient after acute issues stabilize and he has been given time to recover.  Macrocytosis - likely related to alcoholism.  B12 and folic acid checked -- not depleted.  3.  Alcoholic hepatitis.  ,  on admit. Numbers remain stable.   4.  Hypoxemia - on O2 to keep sats up.  Bilateral lung opacities. On antibiotics.  Followed by ID with further workup pendin   5.  Asperger's.  Resides with his mother.                                                          Plan:   1.  Watch LFTs, INR, MELD.  2.  Monitor hemoglobin, watch stools; remain alert for any evidence of active GI bleeding.  If so, then EGD.  If no evidence of active GI bleeding while admitted, then likely outpatient EGD for further assessment of varices and possible intervention.  3.  Additional liver serologies pending - as above.   4.  Alcohol cessation.  has been discussed with the patient and his mother   5.  Increase lactulose 20 mg TID now only having one BM per day and drowsy.   6.  High-protein, low sodium diet.  7.  Outpatient follow-up in GI pathology clinic. [facility] will contact pt to arrange.      Approximately 32 minutes of total time was spent providing patient care, including patient evaluation, reviewing documentation/test result, and .  Noa Carpenter CNP  [facility]    Office

## 2022-04-10 ENCOUNTER — APPOINTMENT (OUTPATIENT)
Dept: PHYSICAL THERAPY | Facility: CLINIC | Age: 27
DRG: 871 | End: 2022-04-10
Payer: COMMERCIAL

## 2022-04-10 ENCOUNTER — APPOINTMENT (OUTPATIENT)
Dept: OCCUPATIONAL THERAPY | Facility: CLINIC | Age: 27
DRG: 871 | End: 2022-04-10
Payer: COMMERCIAL

## 2022-04-10 LAB
ALBUMIN SERPL-MCNC: 3.1 G/DL (ref 3.5–5)
ALP SERPL-CCNC: 145 U/L (ref 45–120)
ALT SERPL W P-5'-P-CCNC: 84 U/L (ref 0–45)
ANION GAP SERPL CALCULATED.3IONS-SCNC: 14 MMOL/L (ref 5–18)
AST SERPL W P-5'-P-CCNC: 114 U/L (ref 0–40)
BILIRUB SERPL-MCNC: 15.2 MG/DL (ref 0–1)
BUN SERPL-MCNC: 10 MG/DL (ref 8–22)
CALCIUM SERPL-MCNC: 9.5 MG/DL (ref 8.5–10.5)
CHLORIDE BLD-SCNC: 90 MMOL/L (ref 98–107)
CO2 SERPL-SCNC: 29 MMOL/L (ref 22–31)
CREAT SERPL-MCNC: 0.73 MG/DL (ref 0.7–1.3)
ERYTHROCYTE [DISTWIDTH] IN BLOOD BY AUTOMATED COUNT: 16.1 % (ref 10–15)
GFR SERPL CREATININE-BSD FRML MDRD: >90 ML/MIN/1.73M2
GLUCOSE BLD-MCNC: 326 MG/DL (ref 70–125)
GLUCOSE BLDC GLUCOMTR-MCNC: 300 MG/DL (ref 70–99)
GLUCOSE BLDC GLUCOMTR-MCNC: 323 MG/DL (ref 70–99)
GLUCOSE BLDC GLUCOMTR-MCNC: 331 MG/DL (ref 70–99)
GLUCOSE BLDC GLUCOMTR-MCNC: 359 MG/DL (ref 70–99)
HCT VFR BLD AUTO: 25.6 % (ref 40–53)
HGB BLD-MCNC: 8.3 G/DL (ref 13.3–17.7)
MAGNESIUM SERPL-MCNC: 1.7 MG/DL (ref 1.8–2.6)
MCH RBC QN AUTO: 34.3 PG (ref 26.5–33)
MCHC RBC AUTO-ENTMCNC: 32.4 G/DL (ref 31.5–36.5)
MCV RBC AUTO: 106 FL (ref 78–100)
PHOSPHATE SERPL-MCNC: 2.5 MG/DL (ref 2.5–4.5)
PLATELET # BLD AUTO: 109 10E3/UL (ref 150–450)
POTASSIUM BLD-SCNC: 3.1 MMOL/L (ref 3.5–5)
POTASSIUM BLD-SCNC: 3.7 MMOL/L (ref 3.5–5)
PROT SERPL-MCNC: 9.1 G/DL (ref 6–8)
RBC # BLD AUTO: 2.42 10E6/UL (ref 4.4–5.9)
SMA IGG SER-ACNC: 28 UNITS
SMOOTH MUSCLE IGG TITR SER: ABNORMAL {TITER}
SODIUM SERPL-SCNC: 133 MMOL/L (ref 136–145)
WBC # BLD AUTO: 9.1 10E3/UL (ref 4–11)

## 2022-04-10 PROCEDURE — 250N000013 HC RX MED GY IP 250 OP 250 PS 637: Performed by: INTERNAL MEDICINE

## 2022-04-10 PROCEDURE — 36415 COLL VENOUS BLD VENIPUNCTURE: CPT | Performed by: FAMILY MEDICINE

## 2022-04-10 PROCEDURE — 250N000013 HC RX MED GY IP 250 OP 250 PS 637: Performed by: NURSE PRACTITIONER

## 2022-04-10 PROCEDURE — 97535 SELF CARE MNGMENT TRAINING: CPT | Mod: GO

## 2022-04-10 PROCEDURE — 36415 COLL VENOUS BLD VENIPUNCTURE: CPT | Performed by: INTERNAL MEDICINE

## 2022-04-10 PROCEDURE — 97116 GAIT TRAINING THERAPY: CPT | Mod: GP

## 2022-04-10 PROCEDURE — 250N000011 HC RX IP 250 OP 636: Performed by: INTERNAL MEDICINE

## 2022-04-10 PROCEDURE — 120N000004 HC R&B MS OVERFLOW

## 2022-04-10 PROCEDURE — 99233 SBSQ HOSP IP/OBS HIGH 50: CPT | Performed by: FAMILY MEDICINE

## 2022-04-10 PROCEDURE — 85027 COMPLETE CBC AUTOMATED: CPT | Performed by: INTERNAL MEDICINE

## 2022-04-10 PROCEDURE — 97110 THERAPEUTIC EXERCISES: CPT | Mod: GO

## 2022-04-10 PROCEDURE — 84132 ASSAY OF SERUM POTASSIUM: CPT | Performed by: FAMILY MEDICINE

## 2022-04-10 PROCEDURE — 84100 ASSAY OF PHOSPHORUS: CPT | Performed by: INTERNAL MEDICINE

## 2022-04-10 PROCEDURE — 80053 COMPREHEN METABOLIC PANEL: CPT | Performed by: INTERNAL MEDICINE

## 2022-04-10 PROCEDURE — 83735 ASSAY OF MAGNESIUM: CPT | Performed by: INTERNAL MEDICINE

## 2022-04-10 RX ORDER — SPIRONOLACTONE 25 MG/1
50 TABLET ORAL DAILY
Status: DISCONTINUED | OUTPATIENT
Start: 2022-04-10 | End: 2022-04-11

## 2022-04-10 RX ORDER — FUROSEMIDE 40 MG
40 TABLET ORAL DAILY
Status: DISCONTINUED | OUTPATIENT
Start: 2022-04-10 | End: 2022-04-12 | Stop reason: HOSPADM

## 2022-04-10 RX ORDER — POTASSIUM CHLORIDE 1500 MG/1
40 TABLET, EXTENDED RELEASE ORAL ONCE
Status: COMPLETED | OUTPATIENT
Start: 2022-04-10 | End: 2022-04-10

## 2022-04-10 RX ADMIN — LACTULOSE 20 G: 10 SOLUTION ORAL at 08:22

## 2022-04-10 RX ADMIN — CEFTAZIDIME 2 G: 2 INJECTION, POWDER, FOR SOLUTION INTRAVENOUS at 22:20

## 2022-04-10 RX ADMIN — FLUOXETINE 10 MG: 10 CAPSULE ORAL at 22:09

## 2022-04-10 RX ADMIN — GABAPENTIN 100 MG: 100 CAPSULE ORAL at 08:22

## 2022-04-10 RX ADMIN — SPIRONOLACTONE 50 MG: 25 TABLET, FILM COATED ORAL at 10:35

## 2022-04-10 RX ADMIN — PANTOPRAZOLE SODIUM 40 MG: 20 TABLET, DELAYED RELEASE ORAL at 08:22

## 2022-04-10 RX ADMIN — CEFTAZIDIME 2 G: 2 INJECTION, POWDER, FOR SOLUTION INTRAVENOUS at 14:23

## 2022-04-10 RX ADMIN — CEFTAZIDIME 2 G: 2 INJECTION, POWDER, FOR SOLUTION INTRAVENOUS at 06:58

## 2022-04-10 RX ADMIN — DOXYCYCLINE 100 MG: 100 INJECTION, POWDER, LYOPHILIZED, FOR SOLUTION INTRAVENOUS at 04:27

## 2022-04-10 RX ADMIN — FUROSEMIDE 20 MG: 10 INJECTION, SOLUTION INTRAVENOUS at 07:01

## 2022-04-10 RX ADMIN — HYDROXYZINE HYDROCHLORIDE 25 MG: 25 TABLET ORAL at 05:12

## 2022-04-10 RX ADMIN — LORAZEPAM 1 MG: 1 TABLET ORAL at 16:35

## 2022-04-10 RX ADMIN — THERA TABS 1 TABLET: TAB at 08:22

## 2022-04-10 RX ADMIN — FUROSEMIDE 40 MG: 40 TABLET ORAL at 14:23

## 2022-04-10 RX ADMIN — GABAPENTIN 100 MG: 100 CAPSULE ORAL at 21:18

## 2022-04-10 RX ADMIN — POTASSIUM CHLORIDE 40 MEQ: 20 TABLET, EXTENDED RELEASE ORAL at 06:55

## 2022-04-10 RX ADMIN — Medication 100 MG: at 08:22

## 2022-04-10 RX ADMIN — LACTULOSE 20 G: 10 SOLUTION ORAL at 14:23

## 2022-04-10 RX ADMIN — LACTULOSE 20 G: 10 SOLUTION ORAL at 21:18

## 2022-04-10 RX ADMIN — HYDROXYZINE HYDROCHLORIDE 25 MG: 25 TABLET ORAL at 14:23

## 2022-04-10 RX ADMIN — GABAPENTIN 100 MG: 100 CAPSULE ORAL at 14:23

## 2022-04-10 RX ADMIN — FOLIC ACID 1 MG: 1 TABLET ORAL at 08:22

## 2022-04-10 RX ADMIN — Medication 1 MG: at 00:55

## 2022-04-10 RX ADMIN — DOXYCYCLINE 100 MG: 100 INJECTION, POWDER, LYOPHILIZED, FOR SOLUTION INTRAVENOUS at 16:35

## 2022-04-10 ASSESSMENT — ACTIVITIES OF DAILY LIVING (ADL)
ADLS_ACUITY_SCORE: 10
ADLS_ACUITY_SCORE: 8
ADLS_ACUITY_SCORE: 10
ADLS_ACUITY_SCORE: 8
ADLS_ACUITY_SCORE: 8
ADLS_ACUITY_SCORE: 10
ADLS_ACUITY_SCORE: 10
ADLS_ACUITY_SCORE: 8
ADLS_ACUITY_SCORE: 10
ADLS_ACUITY_SCORE: 8
ADLS_ACUITY_SCORE: 10

## 2022-04-10 NOTE — PLAN OF CARE
Problem: Behavior Regulation Impairment (Excessive Substance Use)  Goal: Improved Behavioral Control (Excessive Substance Use)  Outcome: Ongoing, Progressing     Problem: Physiologic Impairment (Excessive Substance Use)  Goal: Improved Physiologic Symptoms (Excessive Substance Use)  Outcome: Ongoing, Progressing     Problem: Plan of Care - These are the overarching goals to be used throughout the patient stay.    Goal: Absence of Hospital-Acquired Illness or Injury  Intervention: Identify and Manage Fall Risk  Recent Flowsheet Documentation  Taken 4/10/2022 1200 by Luz Elena Davis RN  Safety Promotion/Fall Prevention:   room near nurse's station   lighting adjusted  Taken 4/10/2022 0800 by Luz Elena Davis RN  Safety Promotion/Fall Prevention:   room near nurse's station   lighting adjusted  Intervention: Prevent Infection  Recent Flowsheet Documentation  Taken 4/10/2022 1200 by Luz Elena Davis, RN  Infection Prevention: hand hygiene promoted  Taken 4/10/2022 0800 by Luz Elena Davis, RN  Infection Prevention: hand hygiene promoted   Goal Outcome Evaluation:     CIWA scores 6, no ativan given. Pt given PRN atarax x1. Pt very motivated to make changes in his life. Appropriate affect, pleasant.

## 2022-04-10 NOTE — PROGRESS NOTES
[facility] MEDICINE PROGRESS NOTE      Identification/Summary: Dillon Salter is a 26 year old male with a past medical history of Autism/Asperger, DM2, anxiety, alcohol abuse x 8.5 years  who was admitted on 4/6/2022 for alcohol detoxification.  Found to have alcohol intoxication with alcohol level 225.  Coagulopathy from chronic liver disease.  Noticed abdominal skin wall bleeding from rubbing, hemoglobin was 8.  He is severely ill with alcoholic hepatitis, coagulopathy, elevated lactic acid, anasarca, pneumonia. CT scan revealed nodular opacities suggestive of multifocal pneumonia.  Started on IV ceftazidime and doxycycline.  Getting hypoxic with minimal exertion.  Requiring 2 L of oxygen today. Alcohol withdrawal symptoms are improved.  He has underlying anxiety.  Evaluated by chemical dependency, infectious disease, gastroenterology.      Assessment and Plan:  Alcohol abuse, dependence ( 8.5 years of heavy use)   Alcohol intoxication  Alcohol withdrawal symptoms are improved    Alcoholic hepatitis  Portal hypertension  Anasarca  Splenomegaly  Coagulopathy  Hyponatremia  Thrombocytopenia  Absolute alcohol cessation is advised  Evaluated by chemical dependency  Patient is very positive to quit drinking alcohol  Appreciate GI consult    Bilateral nodular lung opacities/multifocal pneumonia  Acute hypoxic respiratory failure  Sepsis, resolved  Bilateral small pleural effusion secondary to volume overload  Appreciate ID consult  On IV ceftazidime and doxycycline  Pulmonology consult if no improvement in next day  Resume p.o. Lasix 40 mg daily  Lactulose 20 mg 3 times a day    Thrombocytopenia secondary to chronic alcohol use, platelet 109  Anemia, hgb 8.3  Folic acid, thiamine supplement  Total iron 85    DM2  Hemoglobin A1c 7.4  Lantus 14 units daily and insulin sliding scale, 1 unit per 15 g of carbohydrate    Abdominal wall bleeding in the setting of coagulopathy  Pressure bandages in  place     Anxiety disorder  Fluoxetine 10 mg daily, dose will be gradually increased    History of hypertension, not on antihypertensive  History of dyslipidemia, not on lipid-lowering medications  Code full  DVT prophylaxis  No subcu heparin due to thrombocytopenia and coagulopathy  Barrier to discharge  Awaiting for more clinical improvement.  Anticipated discharge in 1 to 2-day    Elizabeth Guaman MD  [facility] Medicine  [facility]  Phone: #[phone]    Interval History/Subjective:  Resting comfortably.  Getting hypoxic with minimal exertion.  Has underlying anxiety.  Minimal alcohol withdrawal symptoms.  Mother is present.  Patient is very positive to quit drinking alcohol.  Rest of the review of system are negative except HPI.  Denies headache, chest pain, palpitation, nausea, vomiting, abdominal pain or urinary symptoms.    Physical Exam/Objective:  Temp:  [97.3  F (36.3  C)-99.5  F (37.5  C)] 98.8  F (37.1  C)  Pulse:  [101-108] 108  Resp:  [16-22] 20  BP: (120-140)/(67-81) 140/81  SpO2:  [92 %-100 %] 100 %  Body mass index is 25.37 kg/m .    GENERAL:  Alert, appears comfortable, in no acute distress, appears stated age, anxious   HEAD:  Normocephalic, without obvious abnormality, atraumatic   EYES:  PERRL, conjunctiva  clear,  EOM's intact, scleral icterus   NOSE: Nares normal,  mucosa normal, no drainage   THROAT: Lips, mucosa,   gums normal, mouth moist   NECK: Supple, symmetrical, trachea midline   BACK:   Symmetric, no curvature, ROM normal   LUNGS:   Decreased bibasilar breath sounds, no rales, rhonchi, or wheezing, symmetric chest rise on inhalation, respirations unlabored   CHEST WALL:  No tenderness or deformity   HEART:  Regular rate and rhythm, S1 and S2 normal, no murmurs   ABDOMEN:   Soft, non-tender, ascites, bowel sounds active all four quadrants, no masses, no organomegaly, no rebound or guarding   EXTREMITIES: Trace BLE edema    SKIN: No rashes   NEURO:  Alert, oriented x3, moves all four extremities freely   PSYCH: Cooperative, behavior is appropriate      Data reviewed today: I personally reviewed all new medications, labs, imaging/diagnostics reports over the past 24 hours. Pertinent findings include:    Imaging:   Chest  CT  1.  Multifocal groundglass consolidative nodular opacities suggestive of multifocal infection. Other considerations could include septic emboli in the appropriate clinical context given the nodular and somewhat peripheral distribution.  2.  Manifestations of third spacing including pulmonary edema, bilateral effusions, and anasarca.  3.  Partially visualized cirrhosis and portal hypertension.    Echocardiogram  Left ventricular size, wall motion and function are normal. The ejection  fraction is 60-65%.  Normal right ventricle size and systolic function.  No hemodynamically significant valvular abnormalities     Labs:  Most Recent 3 CBC's:Recent Labs   Lab Test 04/10/22  0553 04/09/22  0525 04/08/22  1746 04/08/22  1139 04/08/22  0254   WBC 9.1 8.8  --   --  8.1   HGB 8.3* 8.1* 7.9*   < > 7.3*   * 104*  --   --  104*   * 105*  --   --  91*    < > = values in this interval not displayed.     Most Recent 3 BMP's:Recent Labs   Lab Test 04/10/22  0803 04/10/22  0553 04/09/22  2126 04/09/22  0927 04/09/22  0525 04/08/22  0906 04/08/22  0254   NA  --  133*  --   --  132*  --  131*   POTASSIUM  --  3.1*  --   --  3.5  --  3.7   CHLORIDE  --  90*  --   --  91*  --  90*   CO2  --  29  --   --  29  --  28   BUN  --  10  --   --  12  --  11   CR  --  0.73  --   --  0.66*  --  0.71   ANIONGAP  --  14  --   --  12  --  13   SARTHAK  --  9.5  --   --  9.3  --  9.3   * 326* 344*   < > 315*   < > 335*    < > = values in this interval not displayed.       Medications:   Personally Reviewed.  Medications       cefTAZidime  2 g Intravenous Q8H     doxycycline (VIBRAMYCIN) IV  100 mg Intravenous Q12H     FLUoxetine  10 mg Oral At Bedtime      folic acid  1 mg Oral Daily     furosemide  40 mg Oral Daily     gabapentin  100 mg Oral TID     insulin aspart   Subcutaneous Daily with breakfast     insulin aspart   Subcutaneous Daily with lunch     insulin aspart   Subcutaneous Daily with supper     insulin aspart  1-7 Units Subcutaneous TID AC     insulin aspart  1-5 Units Subcutaneous At Bedtime     insulin glargine  14 Units Subcutaneous QAM AC     lactulose  20 g Oral TID     multivitamin, therapeutic  1 tablet Oral Daily     pantoprazole  40 mg Oral QAM AC     sodium chloride (PF)  3 mL Intracatheter Q8H     spironolactone  50 mg Oral Daily     thiamine  100 mg Oral Daily

## 2022-04-10 NOTE — PROGRESS NOTES
"GASTROENTEROLOGY PROGRESS NOTE     SUBJECTIVE: More alert today, lactulose was increased to 20 g three times daily from 10 g three times daily. Reporting one to two   BM per day.  less O2 demands down to 2 liter of oxygen. Has been up walking with physical therapy. Denies abdominal pain. Bilirubin total with gradual increase with alk phos, ast and alt remain unchanged. This can be seen in alcoholic hepatitis. No abdominal pain. No signs of GI bleeding. HGB remains low but stable. He is concerned of current health status and wants confirmation he will get better. ID following for lung infection.   F actin smooth muscle elevated at 28.      Mother in the room but sleeping. I had discussed plan of care with her yesterday and did not have new information to share today so we did not wake her.   OBJECTIVE:   BP (!) 140/81   Pulse 108   Temp 98.8  F (37.1  C) (Oral)   Resp 20   Ht 1.676 m (5' 6\")   Wt 71.3 kg (157 lb 3.2 oz)   SpO2 100%   BMI 25.37 kg/m     Temp (24hrs), Av.6  F (37  C), Min:97.3  F (36.3  C), Max:99.5  F (37.5  C)     Patient Vitals for the past 72 hrs:   Weight   04/10/22 0418 71.3 kg (157 lb 3.2 oz)   22 0520 70.1 kg (154 lb 9.6 oz)   22 1624 71 kg (156 lb 9.6 oz)        Intake/Output Summary (Last 24 hours) at 4/10/2022 0911  Last data filed at 4/10/2022 0757  Gross per 24 hour   Intake 640 ml   Output 2100 ml   Net -1460 ml      PHYSICAL EXAM   Constitutional: ill appearing male, cachetic, with jaundice and icterus.   Cardiovascular: Regular rate and rhythm.   Respiratory: respirations non labored. On oxygen  Abdomen: rounded nontender, hepatomegally.   Lower extremity edema.     I have reviewed the patient's new clinical lab results:   Recent Labs   Lab Test 04/10/22  0553 22  0525 22  1746 22  1139 22  0254 22  1758 22  1421   WBC 9.1 8.8  --   --  8.1  --   --    HGB 8.3* 8.1* 7.9*   < > 7.3*   < >  --    * 104*  --   --  104*  --   " --    * 105*  --   --  91*  --   --    INR  --  1.82*  --   --  1.85*  --  1.86*    < > = values in this interval not displayed.      Recent Labs   Lab Test 04/10/22  0553 04/09/22  0525 04/08/22  0254   * 132* 131*   POTASSIUM 3.1* 3.5 3.7   CHLORIDE 90* 91* 90*   CO2 29 29 28   BUN 10 12 11   CR 0.73 0.66* 0.71   ANIONGAP 14 12 13   SARTHAK 9.5 9.3 9.3      Recent Labs   Lab Test 04/10/22  0553 04/09/22  0525 04/08/22  0254 04/07/22  0624 04/06/22  2307   ALBUMIN 3.1* 3.0* 3.0*   < > 3.7   BILITOTAL 15.2* 13.9* 12.5*   < > 11.7*   ALT 84* 89* 96*   < > 131*   * 139* 164*   < > 237*   ALKPHOS 145* 131* 116   < > 158*   LIPASE  --   --   --   --  45    < > = values in this interval not displayed.     Hep B and C neg.  Serum iron normal; ferritin mildly up but likely due to acute phase reactant and EtOH.  Ceruloplasmin, DIXIE, F-Actin elevated   Assessment:   1.  Cirrhosis, alcoholic.  Newly diagnosed.  MELD-Na 25 on admission. Outpatient follow up in the hepatology clinic.     2.   Encephalopathy -has had change in sleep-wake cycle and confusion prior to admission.  started lactulose on admission and increased yesterday, mentation has cleared no asterixis.    3.  Ascites-small volume per imaging. likely due to portal hypertension. Exam not concerning for spontaneous bacterial peritonitis. Abdomen is soft non tender. Potassium level has now dropped on furosemide 20 mg IV every 8 hours. Will change to lasix to oral 40 mg daily and aldactone 50 mg daily - recheck CMP in AM if able will then increase accordingly     4. Alcoholic hepatitis- ongoing rise in bilirubin. INR also worsening as of yesterday but no checked today. No signs of worsening encephalopathy. Will continue to monitor.   Will need outpatient follow-up with [facility]     5.  Anemia.  No evidence of overt GI bleeding.  Suspect this is likely bone marrow suppression from alcoholism.  Drop in hemoglobin after admit likely related  to hemodilution after receiving total of 2 L fluid bolus. Hgb stable since.  Patient will need EGD, as CT scan does show varices in upper abdomen.  If he remains without evidence of upper GI bleed, anticipate this will be done as an outpatient after acute issues stabilize and he has been given time to recover.  Macrocytosis - likely related to alcoholism.  B12 and folic acid checked -- not depleted.    6.  Hypoxemia - on O2 to keep sats up.  Bilateral lung opacities. On antibiotics.  Followed by ID with further workup pendin     5.  Asperger's.  Resides with his mother.    6. Diabetes poorly controlled    7 malnutrition     Plan:   1.  Watch LFTs, INR, MELD.  2.  Monitor hemoglobin, watch stools; remain alert for any evidence of active GI bleeding.  If so, then EGD.  If no evidence of active GI bleeding while admitted, then likely outpatient EGD for further assessment of varices and possible intervention.  3.  Additional liver serologies pending - as above.   4.  Alcohol cessation.  has been discussed with the patient and his mother - plans for inpatient treatment   5.  continue lactulose 20 mg TID now only having one BM per day  6.  High-protein, low sodium diet.  7.  Outpatient follow-up in GI hepatology clinic.     Discussed with Dr. Sylvain Bob    Approximately 32 minutes of total time was spent providing patient care, including patient evaluation, reviewing documentation/test result, and .  Noa Carpenter [facility]   Office

## 2022-04-10 NOTE — PLAN OF CARE
Problem: Plan of Care - These are the overarching goals to be used throughout the patient stay.    Goal: Optimal Comfort and Wellbeing  Outcome: Ongoing, Progressing     Problem: Physiologic Impairment (Excessive Substance Use)  Goal: Improved Physiologic Symptoms (Excessive Substance Use)  Outcome: Ongoing, Progressing   Goal Outcome Evaluation:    Patient communicated anxiety and head ache. No relief with prn atarax. Gave Prn ativan. Patient stated relief. Vitally stable. Is on room air. Denies shortness of breath. Tolerating ambulation well. States having 3 stools today. IV abx. Possible discharge in 2-3 days.

## 2022-04-10 NOTE — PROGRESS NOTES
Care Management Follow Up    Length of Stay (days): 4    Expected Discharge Date: 04/11/2022     Concerns to be Addressed:       Patient plan of care discussed at interdisciplinary rounds: Yes    Anticipated Discharge Disposition:  Home with family       Additional Information:  CM called [facility] about Rule 25 CM reached voicemail of Noa and requested call back.       ALEXANDER Tineo

## 2022-04-10 NOTE — PLAN OF CARE
Problem: Physiologic Impairment (Excessive Substance Use)  Goal: Improved Physiologic Symptoms (Excessive Substance Use)  Outcome: Ongoing, Progressing     Problem: Social, Occupational or Functional Impairment (Excessive Substance Use)  Goal: Enhanced Social, Occupational or Functional Skills (Excessive Substance Use)  Outcome: Ongoing, Progressing    Pt is A&Ox4, denied pain this shift. CIWA scores 3, 4, and 6 this shift, mostly driven by anxiety, PRN atarax given with relief. IV abx administered per MAR. Pt is SBA to commode. 2LNC, LSC in upper lobes, dim in lower. Potassium (3.1) replaced this morning. Bilirubin critical lab value of 15.2, MD notified. Pt is resting comfortably in bed, slept mostly through the night. Call light within reach, able to make needs known.

## 2022-04-11 ENCOUNTER — APPOINTMENT (OUTPATIENT)
Dept: PHYSICAL THERAPY | Facility: CLINIC | Age: 27
DRG: 871 | End: 2022-04-11
Payer: COMMERCIAL

## 2022-04-11 ENCOUNTER — APPOINTMENT (OUTPATIENT)
Dept: OCCUPATIONAL THERAPY | Facility: CLINIC | Age: 27
DRG: 871 | End: 2022-04-11
Payer: COMMERCIAL

## 2022-04-11 LAB
1,3 BETA GLUCAN SER-MCNC: 47 PG/ML
ALBUMIN SERPL-MCNC: 3.1 G/DL (ref 3.5–5)
ALP SERPL-CCNC: 139 U/L (ref 45–120)
ALT SERPL W P-5'-P-CCNC: 75 U/L (ref 0–45)
ANION GAP SERPL CALCULATED.3IONS-SCNC: 13 MMOL/L (ref 5–18)
AST SERPL W P-5'-P-CCNC: 102 U/L (ref 0–40)
BILIRUB SERPL-MCNC: 14.9 MG/DL (ref 0–1)
BUN SERPL-MCNC: 11 MG/DL (ref 8–22)
CALCIUM SERPL-MCNC: 9.3 MG/DL (ref 8.5–10.5)
CERULOPLASMIN SERPL-MCNC: 21 MG/DL (ref 20–60)
CHLORIDE BLD-SCNC: 88 MMOL/L (ref 98–107)
CO2 SERPL-SCNC: 29 MMOL/L (ref 22–31)
CREAT SERPL-MCNC: 0.78 MG/DL (ref 0.7–1.3)
GFR SERPL CREATININE-BSD FRML MDRD: >90 ML/MIN/1.73M2
GLUCOSE BLD-MCNC: 294 MG/DL (ref 70–125)
GLUCOSE BLDC GLUCOMTR-MCNC: 268 MG/DL (ref 70–99)
GLUCOSE BLDC GLUCOMTR-MCNC: 300 MG/DL (ref 70–99)
GLUCOSE BLDC GLUCOMTR-MCNC: 330 MG/DL (ref 70–99)
GLUCOSE BLDC GLUCOMTR-MCNC: 349 MG/DL (ref 70–99)
GLUCOSE BLDC GLUCOMTR-MCNC: 379 MG/DL (ref 70–99)
INR PPP: 1.97 (ref 0.85–1.15)
MAGNESIUM SERPL-MCNC: 1.5 MG/DL (ref 1.8–2.6)
OBSERVATION IMP: NEGATIVE
PHOSPHATE SERPL-MCNC: 2.6 MG/DL (ref 2.5–4.5)
POTASSIUM BLD-SCNC: 3.8 MMOL/L (ref 3.5–5)
PROT SERPL-MCNC: 9 G/DL (ref 6–8)
SODIUM SERPL-SCNC: 130 MMOL/L (ref 136–145)

## 2022-04-11 PROCEDURE — 85610 PROTHROMBIN TIME: CPT | Performed by: NURSE PRACTITIONER

## 2022-04-11 PROCEDURE — 120N000004 HC R&B MS OVERFLOW

## 2022-04-11 PROCEDURE — 97116 GAIT TRAINING THERAPY: CPT | Mod: GP

## 2022-04-11 PROCEDURE — 250N000013 HC RX MED GY IP 250 OP 250 PS 637: Performed by: INTERNAL MEDICINE

## 2022-04-11 PROCEDURE — 99233 SBSQ HOSP IP/OBS HIGH 50: CPT | Performed by: FAMILY MEDICINE

## 2022-04-11 PROCEDURE — 99233 SBSQ HOSP IP/OBS HIGH 50: CPT | Performed by: INTERNAL MEDICINE

## 2022-04-11 PROCEDURE — 250N000013 HC RX MED GY IP 250 OP 250 PS 637: Performed by: PHYSICIAN ASSISTANT

## 2022-04-11 PROCEDURE — 80053 COMPREHEN METABOLIC PANEL: CPT | Performed by: NURSE PRACTITIONER

## 2022-04-11 PROCEDURE — 83735 ASSAY OF MAGNESIUM: CPT | Performed by: INTERNAL MEDICINE

## 2022-04-11 PROCEDURE — 250N000011 HC RX IP 250 OP 636: Performed by: FAMILY MEDICINE

## 2022-04-11 PROCEDURE — 250N000013 HC RX MED GY IP 250 OP 250 PS 637: Performed by: NURSE PRACTITIONER

## 2022-04-11 PROCEDURE — 250N000013 HC RX MED GY IP 250 OP 250 PS 637: Performed by: FAMILY MEDICINE

## 2022-04-11 PROCEDURE — 250N000011 HC RX IP 250 OP 636: Performed by: INTERNAL MEDICINE

## 2022-04-11 PROCEDURE — 84100 ASSAY OF PHOSPHORUS: CPT | Performed by: INTERNAL MEDICINE

## 2022-04-11 PROCEDURE — 97110 THERAPEUTIC EXERCISES: CPT | Mod: GO

## 2022-04-11 PROCEDURE — 36415 COLL VENOUS BLD VENIPUNCTURE: CPT | Performed by: NURSE PRACTITIONER

## 2022-04-11 PROCEDURE — 250N000009 HC RX 250: Performed by: FAMILY MEDICINE

## 2022-04-11 RX ORDER — LANOLIN ALCOHOL/MO/W.PET/CERES
100 CREAM (GRAM) TOPICAL DAILY
Qty: 30 TABLET | Refills: 0 | Status: SHIPPED | OUTPATIENT
Start: 2022-04-12 | End: 2023-03-28

## 2022-04-11 RX ORDER — METRONIDAZOLE 500 MG/1
500 TABLET ORAL 2 TIMES DAILY
Status: DISCONTINUED | OUTPATIENT
Start: 2022-04-11 | End: 2022-04-12 | Stop reason: HOSPADM

## 2022-04-11 RX ORDER — MAGNESIUM OXIDE 400 MG/1
400 TABLET ORAL 3 TIMES DAILY
Status: DISCONTINUED | OUTPATIENT
Start: 2022-04-11 | End: 2022-04-12 | Stop reason: HOSPADM

## 2022-04-11 RX ORDER — LACTULOSE 10 G/15ML
20 SOLUTION ORAL 3 TIMES DAILY
Qty: 1892 ML | Refills: 1 | Status: ON HOLD | OUTPATIENT
Start: 2022-04-11 | End: 2022-06-07

## 2022-04-11 RX ORDER — MAGNESIUM OXIDE 400 MG/1
400 TABLET ORAL 2 TIMES DAILY
Qty: 60 TABLET | Refills: 0 | Status: SHIPPED | OUTPATIENT
Start: 2022-04-11 | End: 2022-07-07

## 2022-04-11 RX ORDER — FOLIC ACID 1 MG/1
1 TABLET ORAL DAILY
Qty: 30 TABLET | Refills: 0 | Status: SHIPPED | OUTPATIENT
Start: 2022-04-12 | End: 2022-06-27

## 2022-04-11 RX ORDER — SPIRONOLACTONE 100 MG/1
100 TABLET, FILM COATED ORAL DAILY
Qty: 30 TABLET | Refills: 3 | Status: ON HOLD | OUTPATIENT
Start: 2022-04-12 | End: 2022-06-07

## 2022-04-11 RX ORDER — MULTIVITAMIN,THERAPEUTIC
1 TABLET ORAL DAILY
Status: ON HOLD | COMMUNITY
Start: 2022-04-12 | End: 2023-02-13

## 2022-04-11 RX ORDER — SPIRONOLACTONE 25 MG/1
100 TABLET ORAL DAILY
Status: DISCONTINUED | OUTPATIENT
Start: 2022-04-12 | End: 2022-04-12 | Stop reason: HOSPADM

## 2022-04-11 RX ORDER — MAGNESIUM SULFATE 4 G/50ML
4 INJECTION INTRAVENOUS ONCE
Status: COMPLETED | OUTPATIENT
Start: 2022-04-11 | End: 2022-04-11

## 2022-04-11 RX ORDER — FUROSEMIDE 40 MG
40 TABLET ORAL DAILY
Qty: 30 TABLET | Refills: 3 | Status: ON HOLD | OUTPATIENT
Start: 2022-04-12 | End: 2022-06-07

## 2022-04-11 RX ORDER — CALCIUM CARBONATE 500 MG/1
500 TABLET, CHEWABLE ORAL 2 TIMES DAILY PRN
Status: DISCONTINUED | OUTPATIENT
Start: 2022-04-11 | End: 2022-04-12 | Stop reason: HOSPADM

## 2022-04-11 RX ORDER — CEFDINIR 300 MG/1
300 CAPSULE ORAL EVERY 12 HOURS SCHEDULED
Status: DISCONTINUED | OUTPATIENT
Start: 2022-04-11 | End: 2022-04-12 | Stop reason: HOSPADM

## 2022-04-11 RX ORDER — SPIRONOLACTONE 25 MG/1
50 TABLET ORAL ONCE
Status: COMPLETED | OUTPATIENT
Start: 2022-04-11 | End: 2022-04-11

## 2022-04-11 RX ADMIN — LACTULOSE 20 G: 10 SOLUTION ORAL at 08:01

## 2022-04-11 RX ADMIN — Medication 400 MG: at 20:42

## 2022-04-11 RX ADMIN — HYDROXYZINE HYDROCHLORIDE 25 MG: 25 TABLET ORAL at 01:14

## 2022-04-11 RX ADMIN — PANTOPRAZOLE SODIUM 40 MG: 20 TABLET, DELAYED RELEASE ORAL at 08:01

## 2022-04-11 RX ADMIN — LORAZEPAM 1 MG: 1 TABLET ORAL at 04:33

## 2022-04-11 RX ADMIN — Medication 400 MG: at 13:29

## 2022-04-11 RX ADMIN — Medication 100 MG: at 08:01

## 2022-04-11 RX ADMIN — PHYTONADIONE 5 MG: 10 INJECTION, EMULSION INTRAMUSCULAR; INTRAVENOUS; SUBCUTANEOUS at 16:28

## 2022-04-11 RX ADMIN — DOXYCYCLINE 100 MG: 100 INJECTION, POWDER, LYOPHILIZED, FOR SOLUTION INTRAVENOUS at 17:25

## 2022-04-11 RX ADMIN — CEFTAZIDIME 2 G: 2 INJECTION, POWDER, FOR SOLUTION INTRAVENOUS at 08:00

## 2022-04-11 RX ADMIN — FLUOXETINE HYDROCHLORIDE 20 MG: 20 CAPSULE ORAL at 21:04

## 2022-04-11 RX ADMIN — GABAPENTIN 100 MG: 100 CAPSULE ORAL at 08:01

## 2022-04-11 RX ADMIN — FUROSEMIDE 40 MG: 40 TABLET ORAL at 08:01

## 2022-04-11 RX ADMIN — SPIRONOLACTONE 50 MG: 25 TABLET, FILM COATED ORAL at 13:29

## 2022-04-11 RX ADMIN — METRONIDAZOLE 500 MG: 500 TABLET ORAL at 20:51

## 2022-04-11 RX ADMIN — FOLIC ACID 1 MG: 1 TABLET ORAL at 08:01

## 2022-04-11 RX ADMIN — CEFTAZIDIME 2 G: 2 INJECTION, POWDER, FOR SOLUTION INTRAVENOUS at 16:28

## 2022-04-11 RX ADMIN — MAGNESIUM SULFATE HEPTAHYDRATE 4 G: 80 INJECTION, SOLUTION INTRAVENOUS at 10:36

## 2022-04-11 RX ADMIN — SPIRONOLACTONE 50 MG: 25 TABLET, FILM COATED ORAL at 08:01

## 2022-04-11 RX ADMIN — LACTULOSE 20 G: 10 SOLUTION ORAL at 20:41

## 2022-04-11 RX ADMIN — THERA TABS 1 TABLET: TAB at 08:01

## 2022-04-11 RX ADMIN — LACTULOSE 20 G: 10 SOLUTION ORAL at 13:29

## 2022-04-11 RX ADMIN — Medication 1 MG: at 01:14

## 2022-04-11 RX ADMIN — Medication 400 MG: at 08:01

## 2022-04-11 RX ADMIN — GABAPENTIN 100 MG: 100 CAPSULE ORAL at 13:29

## 2022-04-11 RX ADMIN — CEFDINIR 300 MG: 300 CAPSULE ORAL at 20:42

## 2022-04-11 RX ADMIN — DOXYCYCLINE 100 MG: 100 INJECTION, POWDER, LYOPHILIZED, FOR SOLUTION INTRAVENOUS at 04:25

## 2022-04-11 ASSESSMENT — ACTIVITIES OF DAILY LIVING (ADL)
ADLS_ACUITY_SCORE: 8

## 2022-04-11 NOTE — PLAN OF CARE
Problem: Plan of Care - These are the overarching goals to be used throughout the patient stay.    Goal: Optimal Comfort and Wellbeing  Outcome: Ongoing, Progressing     Problem: Decreased Participation and Engagement (Excessive Substance Use)  Goal: Increased Participation and Engagement (Excessive Substance Use)  Outcome: Ongoing, Progressing     Pt A&Ox4, reports some mild pain/discomfort in shoulders, relieved by more comfortable positioning. Pt continues to report anxiety this shift, PRN atarax given with some relief. CIWA scores this shift 4, 5, 8, and 3. PRN ativan given x1 this shift. Pt up independently to toilet, x2 loose BM's this shift. IV abx administered per MAR. Pt is up in chair resting comfortably, call light within reach, able to make needs known.      Urmila Lock RN  [phone]

## 2022-04-11 NOTE — PROGRESS NOTES
[facility] MEDICINE PROGRESS NOTE      Identification/Summary: Dillon Salter is a 26 year old male with a past medical history of Autism/Asperger, DM2, anxiety, alcohol abuse x 8.5 years  who was admitted on 4/6/2022 for alcohol detoxification.  Found to have alcohol intoxication with alcohol level 225.    Was on CIWA protocol for alcohol withdrawal symptoms.  Alcohol withdrawal symptoms are stable.  He has underlying anxiety.  Fluoxetine dose increased to 20 mg daily.  Coagulopathy from chronic liver disease.  Had recent gum bleeding and abdominal skin wall bleeding.  Will get vitamin K.  Has alcoholic hepatitis, coagulopathy, thrombocytopenia, anasarca.  Meld score 28.  Absolute alcohol cessation is advised.  Will get outpatient CD treatment.  Patient is very positive to quit drinking alcohol.  On Lasix 40 mg daily, spironolactone 100 mg daily for volume overload.  Chest CT scan revealed nodular opacities suggestive of multifocal pneumonia.  Started on IV ceftazidime and doxycycline.  Oxygen is able to wean off today.  He has uncontrolled DM2.  Hemoglobin A1c 7.4.  Was not taking antidiabetic medications.  Started on Lantus and insulin sliding scale.  Diabetic education prior to discharge.  Evaluated by chemical dependency, infectious disease, gastroenterology.       Assessment and Plan:  Alcohol abuse, dependence ( 8.5 years of heavy use)   Alcohol intoxication  Alcohol withdrawal symptoms are improved  Absolute alcohol cessation advised  Outpatient CD treatment     Alcoholic hepatitis  Portal hypertension  Anasarca  Splenomegaly  Coagulopathy  Hyponatremia  Thrombocytopenia  Meld score 28  Lasix 40 mg daily  Spironolactone 100 mg daily, dose increased  Low sodium diet  Lactulose 20 g 3 times a day  Follow-up with hepatology as outpatient  EGD as outpatient  Appreciate GI consult     Bilateral nodular lung opacities/multifocal pneumonia  Acute hypoxic respiratory failure,  resolved  Sepsis, resolved  Bilateral small pleural effusion secondary to volume overload  Appreciate ID consult  On IV ceftazidime and doxycycline    Thrombocytopenia secondary to chronic alcohol use, platelet 109  Anemia, hgb 8.3  Folic acid, thiamine supplement  Total iron 85  Recheck labs tomorrow.      DM2  Hemoglobin A1c 7.4  Lantus 20 units daily and insulin sliding scale, 1 unit per 15 g of carbohydrate  Diabetic education    Abdominal wall bleeding in the setting of coagulopathy, no active bleeding  Pressure bandages in place   Recent gum bleeding  Vitamin K 5 mg daily for 2 to 3 days    Anxiety disorder  Fluoxetine dose increased to 20 mg daily     History of hypertension, not on antihypertensive  History of dyslipidemia, not on lipid-lowering medications    Diet: Snacks/Supplements Adult: Glucerna; With Meals  Combination Diet Regular Diet; Moderate Consistent Carb (60 g CHO per Meal) Diet; 2 gm NA Diet  DVT Prophylaxis:  Ambulate every shift. No subcutaneous heparin due to thrombocytopenia and coagulopathy.   Code Status: Full Code    Anticipated possible discharge in tomorrow to home once more clinical improvement.   Disposition Plan   Expected Discharge: 04/12/2022     Anticipated discharge location: home with family      I reviewed and agreed with Kendra Whitney's note and plan. I examined the Patient by myself.    Elizabeth Guaman MD  [facility]     The patient's care was discussed with the Attending Physician, Dr. Guaman, Patient and Patient's Family.    Kendra Whitney PA-S  [facility] Medicine  [facility]  Phone: #[phone]    Interval History/Subjective:  Increased anxiety during hospital stay. Admits to increased anxiety when at the hospital versus home. Feels his symptoms have improved and would like to go home. Had loose bowel movements today. Not requiring oxygen. Denies chest pain, SOB. Denies abdominal pain, nausea, vomiting. Denies headache.      Physical Exam/Objective:  Temp:  [98.5  F (36.9  C)-99.9  F (37.7  C)] 99.9  F (37.7  C)  Pulse:  [] 100  Resp:  [16-20] 16  BP: (114-139)/(62-77) 114/62  SpO2:  [88 %-98 %] 96 %  Body mass index is 25.1 kg/m .    GENERAL:  Alert, appears comfortable, in no acute distress, appears stated age, anxious, anasarca   HEAD:  Normocephalic, without obvious abnormality, atraumatic   EYES:   conjunctiva/corneas clear, scleral icterus   NOSE: Nares normal, no drainage   THROAT: Lips, mucosa, and gums normal, mouth moist   NECK: Supple, symmetrical, trachea midline   BACK:   Symmetric, no curvature, ROM normal   LUNGS:   CTA - lung sounds improved, no rales, rhonchi, or wheezing, symmetric chest rise on inhalation, respirations unlabored   CHEST WALL:  No tenderness or deformity   HEART:  Regular rate and rhythm, S1 and S2 normal, no murmur, rub, or gallop    ABDOMEN:   Soft, non-tender, ascites, bowel sounds active all four quadrants, no masses, no organomegaly, no rebound or guarding   EXTREMITIES: 1+ BLE edema   SKIN: Jaundiced   NEURO: Alert, oriented x3, moves all four extremities freely   PSYCH: Cooperative, behavior is appropriate      Data reviewed today: I personally reviewed all new medications, labs, imaging/diagnostics reports over the past 24 hours. Pertinent findings include:    Imaging:   No results found for this or any previous visit (from the past 24 hour(s)).    Labs:  Most Recent 3 CBC's:Recent Labs   Lab Test 04/10/22  0553 04/09/22  0525 04/08/22  1746 04/08/22  1139 04/08/22  0254   WBC 9.1 8.8  --   --  8.1   HGB 8.3* 8.1* 7.9*   < > 7.3*   * 104*  --   --  104*   * 105*  --   --  91*    < > = values in this interval not displayed.     Most Recent 3 BMP's:Recent Labs   Lab Test 04/11/22  0800 04/11/22  0426 04/10/22  2137 04/10/22  1851 04/10/22  0803 04/10/22  0553 04/09/22  0927 04/09/22  0525   NA  --  130*  --   --   --  133*  --  132*   POTASSIUM  --  3.8  --  3.7  --  3.1*   --  3.5   CHLORIDE  --  88*  --   --   --  90*  --  91*   CO2  --  29  --   --   --  29  --  29   BUN  --  11  --   --   --  10  --  12   CR  --  0.78  --   --   --  0.73  --  0.66*   ANIONGAP  --  13  --   --   --  14  --  12   SARTHAK  --  9.3  --   --   --  9.5  --  9.3   * 294* 300*  --    < > 326*   < > 315*    < > = values in this interval not displayed.       Medications:   Personally Reviewed.  Medications       cefTAZidime  2 g Intravenous Q8H     doxycycline (VIBRAMYCIN) IV  100 mg Intravenous Q12H     FLUoxetine  20 mg Oral At Bedtime     folic acid  1 mg Oral Daily     furosemide  40 mg Oral Daily     gabapentin  100 mg Oral TID     insulin aspart   Subcutaneous Daily with breakfast     insulin aspart   Subcutaneous Daily with lunch     insulin aspart   Subcutaneous Daily with supper     insulin aspart  1-7 Units Subcutaneous TID AC     insulin aspart  1-5 Units Subcutaneous At Bedtime     [START ON 4/12/2022] insulin glargine  18 Units Subcutaneous QAM AC     lactulose  20 g Oral TID     magnesium oxide  400 mg Oral TID     magnesium sulfate  4 g Intravenous Once     multivitamin, therapeutic  1 tablet Oral Daily     pantoprazole  40 mg Oral QAM AC     sodium chloride (PF)  3 mL Intracatheter Q8H     spironolactone  50 mg Oral Daily     thiamine  100 mg Oral Daily

## 2022-04-11 NOTE — PROGRESS NOTES
"[facility] Digestive Health Progress Note       SUBJECTIVE:  Patient states he feels good physically but not emotionally, feeling sad today. He gets worked up due to autism and anxiety and sometimes his breathing gets fast because of it, not because of his pneumonia. He's not coughing much. Having at least 3-4 loose stools per day. Appetite is okay.        OBJECTIVE:  /62   Pulse 100   Temp 99.9  F (37.7  C) (Oral)   Resp 16   Ht 1.676 m (5' 6\")   Wt 70.5 kg (155 lb 8 oz)   SpO2 96%   BMI 25.10 kg/m    Temp (24hrs), Av.2  F (37.3  C), Min:98.5  F (36.9  C), Max:99.9  F (37.7  C)    Patient Vitals for the past 72 hrs:   Weight   22 0443 70.5 kg (155 lb 8 oz)   04/10/22 0418 71.3 kg (157 lb 3.2 oz)   22 0520 70.1 kg (154 lb 9.6 oz)       Intake/Output Summary (Last 24 hours) at 2022 1057  Last data filed at 2022 1000  Gross per 24 hour   Intake 1080 ml   Output --   Net 1080 ml        PHYSICAL EXAM  GEN: NAD, young male chronically ill-appearing lying in bed  HRT: b/l LE edema  RESP: unlabored  ABD: mildly distended, soft, nontender  SKIN: + jaundice, no rash      Additional Data:  I have reviewed the patient's new clinical lab results:     Recent Labs   Lab Test 04/11/22  0426 04/10/22  0553 22  0525 22  1746 22  1139 22  0254   WBC  --  9.1 8.8  --   --  8.1   HGB  --  8.3* 8.1* 7.9*   < > 7.3*   MCV  --  106* 104*  --   --  104*   PLT  --  109* 105*  --   --  91*   INR 1.97*  --  1.82*  --   --  1.85*    < > = values in this interval not displayed.     Recent Labs   Lab Test 04/11/22  0426 04/10/22  1851 04/10/22  0553 22  0525   POTASSIUM 3.8 3.7 3.1* 3.5   CHLORIDE 88*  --  90* 91*   CO2 29  --  29 29   BUN 11  --  10 12   ANIONGAP 13  --  14 12     Recent Labs   Lab Test 22  0426 04/10/22  0553 22  0525 22  0624 22  2307   ALBUMIN 3.1* 3.1* 3.0*   < > 3.7   BILITOTAL 14.9* 15.2* 13.9*   < > 11.7*   ALT 75* 84* 89*   < > 131* "   * 114* 139*   < > 237*   LIPASE  --   --   --   --  45    < > = values in this interval not displayed.     Acute hepatitis panel 4/7/22: negative  Hemoglobin A1c 4/7/22: 7.4%  MELD Na 4/11/22: 28    Imaging results:  CT chest w/o contrast 4/8/22:  IMPRESSION:   1.  Multifocal groundglass consolidative nodular opacities suggestive of multifocal infection. Other considerations could include septic emboli in the appropriate clinical context given the nodular and somewhat peripheral distribution.  2.  Manifestations of third spacing including pulmonary edema, bilateral effusions, and anasarca.  3.  Partially visualized cirrhosis and portal hypertension.    US LE b/l 4/7/22:  IMPRESSION: No evidence of thrombus in the major veins of bilateral lower extremities    CXR 4/7/22:  IMPRESSION: Heart size within normal limits. Bilateral central and basilar airspace infiltrates with mild basilar interstitial prominence. Findings likely reflect a degree of interstitial edema, likely with superimposed pneumonia or pneumonitis.   Recommend follow-up chest radiographs to ensure resolution. No pneumothorax.    CT abdomen/pelvis 4/7/22:  IMPRESSION:   1.  Multifocal regions of solid, and groundglass nodularity in the visualized mid and lower lungs with superimposed background of mild interstitial infiltrates.  2.  Cirrhosis with splenomegaly and small volume ascites. Multiple varices compatible with portal hypertension.  3.  Gallbladder is distended with wall thickening which can be seen with parenchymal disease. Sludge within the gallbladder lumen.  4.  Anasarca.  5.  Calcification vas deferens compatible with diabetes.    RUQ ultrasound 4/6/22:  FINDINGS:  GALLBLADDER: Gallbladder is distended with wall thickening and tumefactive sludge.  BILE DUCTS: No biliary dilatation. The common duct measures 6 mm.  LIVER: Hepatomegaly with coarsened hepatic echotexture.  RIGHT KIDNEY: No hydronephrosis.  PANCREAS: The visualized  portions are normal.  No significant ascites.                                                                   IMPRESSION:  1.  Hepatomegaly with coarsening of hepatic echotexture compatible with cirrhosis.  2.  Gallbladder distention with wall thickening. Wall thickening can be seen with hepatic parenchymal disease. Tumefactive sludge. No bile duct dilatation.       IMPRESSION:  Alcoholic cirrhosis with ascites  Alcoholic hepatitis  Hepatic Encephalopathy  Pneumonia  27 y/o male with PMH Asperger's, alcohol abuse, anxiety/depression admitted 4/6 for new diagnosis of cirrhosis seen on imaging with alcoholic hepatitis and signs of portal hypertension, coagulopathy, and hepatic encephalopathy and pneumonia. MELD Na 25 on admission and is 28 today. Liver tests including hemoglobin trending down today. No steroids for alc hep given pneumonia. He is on low dose diuretics, potassium low initially but WNL now. He is not on low sodium diet so this will be started. LE edema continues, not sufficient abdominal ascites for tap so unlikely he has SBP. He is having adequate bowel movements on increased dose of lactulose without signs of HE. No overt GI bleeding. Hemoglobin is low but has been stable. He is on abx for PNA. On CIWA protocol. He states he is open to outpatient treatment but not inpatient.    PLAN:  - Low sodium, diabetics diet- recommended good amount of protein to hopefully help with muscle wasting  - Continue lactulose 20 g TID  - Continue furosemide 40 mg daily and increase spironolactone to 100 mg daily, monitor electrolytes and creatinine per medicine  - Alcohol treatment program encouraged, complete alcohol cessation needed  - Outpatient Hepatology follow up ([facility] will call pt to arrange) with eventual EGD        (Dr. Jones)  Cece Heaton PA-C  [facility]  4/11/2022 10:57 AM  [phone] (office)  ________________________________________________________________________

## 2022-04-11 NOTE — PROGRESS NOTES
"CLINICAL NUTRITION SERVICES - REASSESSMENT NOTE     Nutrition Prescription    RECOMMENDATIONS FOR MDs/PROVIDERS TO ORDER:  None at this time    Malnutrition Status:    % Weight Loss:  Weight loss does not meet criteria for malnutrition   % Intake:  <75% for >/= 1 months (moderate malnutrition)  Subcutaneous Fat Loss:  Upper arm region severe depletion  Muscle Loss:  Clavicle bone region moderate depletion, Acromion bone region severe depletion and Scapular bone region moderate depletion  Fluid Retention:  Mild     Malnutrition Diagnosis: Severe malnutrition  In Context of:  Chronic illness    Recommendations already ordered by Registered Dietitian (RD):  Continue Glucerna 2x/day    Future/Additional Recommendations:  Would benefit from outpatient RD follow-up     EVALUATION OF THE PROGRESS TOWARD GOALS   Diet: Moderate Consistent Carbohydrate  Nutrition Supplement/Support: Glucerna 2x/day with meals  Intake: %      NEW FINDINGS   Patient reports appetite is improving. Not drinking Glucerna because he did not know what it was. Intake PTA was poor. \"I pretty much drank my dinner.\" Primary focus now is alcohol cessation but interested in making nutrition changes to support liver health. Was independent with cooking but lives with parents who can help with meal prep if needed.  FA    ANTHROPOMETRICS  Admission wt: 71 kg  Most Recent Weight: 70.5 kg      PHYSICAL FINDINGS  Per flowsheet:  Edema: +2 legs and feet  Skin: no breakdown noted  GI: last BM 4/10    LABS  Reviewed: Na 130, Mg 1.5, glucose 349, 294, 300, 331    MEDICATIONS  Reviewed: folic acid, furosemide, Novolog, lantus, lactulose, Mg oxide, MVI, thiamine    MALNUTRITION:  % Weight Loss:  Weight loss does not meet criteria for malnutrition   % Intake:  <75% for >/= 1 months (moderate malnutrition)  Subcutaneous Fat Loss:  Upper arm region severe depletion  Muscle Loss:  Clavicle bone region moderate depletion, Acromion bone region severe depletion and " Scapular bone region moderate depletion  Fluid Retention:  Mild     Malnutrition Diagnosis: Severe malnutrition  In Context of:  Chronic illness      NUTRITION DIAGNOSIS  Inadequate protein-energy intake related to reduced appetite and substitution of food with alcohol as evidenced by pt reports of reduced PO, wt loss of 17% in past 1 year, and reports of excessive alcohol intake.  --Evaluation: discontinue    NEW NUTRITION DIAGNOSIS:  Malnutrition related to Inadequate protein-energy intake related to reduced appetite and substitution of food with alcohol as evidenced by pt reports of reduced PO, wt loss of 17% in past 1 year with fluid accumulation that may be masking more significant wt loss, evidence of muscle and fat wasting, and reports of excessive alcohol intake.    Goals:  Patient to consume % of nutritionally adequate meals three times per day, or the equivalent with supplements/snacks. --Nearly met, ongoing    INTERVENTIONS  Implementation  -Provided Nutrition Education on high-protein, low-sodium diet for liver health. Discussed protein foods to choose that will not increase blood sugar for diabetes. Answered questions on protein supplements and recommended high-protein, low-carb supplement if needed. Provided handout: Cirrhosis Nutrition Therapy with handwritten notes.  -Recommended outpatient RD follow-up for liver health and diabetes management  -Affirmed patient's desire to focus on alcohol cessation and discussed that even small steps towards better nutrition have a large impact.  -Continue Glucerna 2x/day    Monitoring/Evaluation  Progress toward goals will be monitored and evaluated per protocol.

## 2022-04-11 NOTE — CONSULTS
Consult done on 4 7 with follow up phone call with Dillon ricardo 4/11.  Thank you.    Rosalva Mccarty RN, [facility]

## 2022-04-11 NOTE — CONSULTS
Consult done on 4/7. See note.  Did call Dillon and discuss any questions or concerns he may have today.  Discussed diet in some detail to help him with foods he does like to it and the carbohydrate count.   Also discussed that he will be discharged with injection insulin for better diabetes control when discharged. He is ready for that. He was given Accu chek guide meter on 4/7.    Discharge needs remain:   Lantus solostar or Levemir Flex pens ( insurance coverage) pack of 5 pens  Pen needles 32 gauge x 4mm   Novolog flex pens if meal insulin is needed, pack of 5 pens  1. Accuchek Guide strips, 2 boxes of 50  2. SoftClix lancets, 1 box  insulin requiring  Diagnosis code: E11.65  To test BG twice daily if no meal insulin and test before all meals in meal insulin.        Thank you.  Rosalva Mccarty RN, [facility]  VM: [phone]  Pager: [phone]

## 2022-04-11 NOTE — PROGRESS NOTES
Care Management Follow Up    Length of Stay (days): 5    Expected Discharge Date: 04/12/2022     Concerns to be Addressed:       Patient plan of care discussed at interdisciplinary rounds: Yes    Anticipated Discharge Disposition:  Home with family and OP CD trx services      Additional Information:  ERIC spoke with pt/mom in room. Pt signed [place] information for [employer] to complete Rule 25/CD assessment for ongoing CD trx recommendations. Pt/mom aware that  will call on pt cell or room phone within the hour to complete assessment.       ERIC spoke with [employer] about phone/inperson Rule 25. Provided pt insurance information and birthday. Agency will screen insurance and return call with time available for CD assessment / Rule 25.       MILLER Parish

## 2022-04-12 VITALS
HEIGHT: 66 IN | WEIGHT: 158.7 LBS | HEART RATE: 97 BPM | DIASTOLIC BLOOD PRESSURE: 75 MMHG | TEMPERATURE: 99.1 F | BODY MASS INDEX: 25.51 KG/M2 | OXYGEN SATURATION: 98 % | RESPIRATION RATE: 20 BRPM | SYSTOLIC BLOOD PRESSURE: 138 MMHG

## 2022-04-12 LAB
ANA SER QL IF: NEGATIVE
BACTERIA BLD CULT: NO GROWTH
CRYPTOC AG SPEC QL: NEGATIVE
GLUCOSE BLDC GLUCOMTR-MCNC: 257 MG/DL (ref 70–99)
H CAPSUL AG SER IA-ACNC: NOT DETECTED
H CAPSUL AG SER QL IA: NOT DETECTED
PHOSPHATE SERPL-MCNC: 2.5 MG/DL (ref 2.5–4.5)
POTASSIUM BLD-SCNC: 3.8 MMOL/L (ref 3.5–5)

## 2022-04-12 PROCEDURE — 250N000013 HC RX MED GY IP 250 OP 250 PS 637: Performed by: INTERNAL MEDICINE

## 2022-04-12 PROCEDURE — 36415 COLL VENOUS BLD VENIPUNCTURE: CPT | Performed by: FAMILY MEDICINE

## 2022-04-12 PROCEDURE — 84100 ASSAY OF PHOSPHORUS: CPT | Performed by: FAMILY MEDICINE

## 2022-04-12 PROCEDURE — 250N000009 HC RX 250: Performed by: FAMILY MEDICINE

## 2022-04-12 PROCEDURE — 250N000011 HC RX IP 250 OP 636: Performed by: FAMILY MEDICINE

## 2022-04-12 PROCEDURE — 99239 HOSP IP/OBS DSCHRG MGMT >30: CPT | Performed by: FAMILY MEDICINE

## 2022-04-12 PROCEDURE — 250N000013 HC RX MED GY IP 250 OP 250 PS 637: Performed by: FAMILY MEDICINE

## 2022-04-12 PROCEDURE — 250N000013 HC RX MED GY IP 250 OP 250 PS 637: Performed by: NURSE PRACTITIONER

## 2022-04-12 PROCEDURE — 87899 AGENT NOS ASSAY W/OPTIC: CPT | Performed by: INTERNAL MEDICINE

## 2022-04-12 PROCEDURE — 84132 ASSAY OF SERUM POTASSIUM: CPT | Performed by: FAMILY MEDICINE

## 2022-04-12 PROCEDURE — 250N000013 HC RX MED GY IP 250 OP 250 PS 637: Performed by: PHYSICIAN ASSISTANT

## 2022-04-12 RX ORDER — PEN NEEDLE, DIABETIC 32GX 5/32"
NEEDLE, DISPOSABLE MISCELLANEOUS
Qty: 100 EACH | Refills: 3 | Status: SHIPPED | OUTPATIENT
Start: 2022-04-12 | End: 2023-01-21

## 2022-04-12 RX ORDER — GLUCOSAMINE HCL/CHONDROITIN SU 500-400 MG
CAPSULE ORAL
Qty: 100 EACH | Refills: 3 | Status: SHIPPED | OUTPATIENT
Start: 2022-04-12 | End: 2022-04-12

## 2022-04-12 RX ORDER — METRONIDAZOLE 500 MG/1
500 TABLET ORAL 2 TIMES DAILY
Qty: 14 TABLET | Refills: 0 | Status: SHIPPED | OUTPATIENT
Start: 2022-04-12 | End: 2022-04-19

## 2022-04-12 RX ORDER — INSULIN ASPART 100 [IU]/ML
INJECTION, SOLUTION INTRAVENOUS; SUBCUTANEOUS
Qty: 15 ML | Refills: 3 | Status: ON HOLD | OUTPATIENT
Start: 2022-04-12 | End: 2022-08-02

## 2022-04-12 RX ORDER — GABAPENTIN 100 MG/1
100 CAPSULE ORAL AT BEDTIME
Qty: 7 CAPSULE | Refills: 0 | Status: ON HOLD | OUTPATIENT
Start: 2022-04-12 | End: 2022-06-07

## 2022-04-12 RX ORDER — LANCETS
EACH MISCELLANEOUS
Qty: 100 EACH | Refills: 6 | Status: SHIPPED | OUTPATIENT
Start: 2022-04-12 | End: 2023-01-21

## 2022-04-12 RX ORDER — CEFDINIR 300 MG/1
300 CAPSULE ORAL EVERY 12 HOURS
Qty: 14 CAPSULE | Refills: 0 | Status: SHIPPED | OUTPATIENT
Start: 2022-04-12 | End: 2022-04-19

## 2022-04-12 RX ORDER — PANTOPRAZOLE SODIUM 40 MG/1
40 TABLET, DELAYED RELEASE ORAL
Qty: 30 TABLET | Refills: 0 | Status: ON HOLD | OUTPATIENT
Start: 2022-04-13 | End: 2022-06-07

## 2022-04-12 RX ORDER — INSULIN GLARGINE 100 [IU]/ML
25 INJECTION, SOLUTION SUBCUTANEOUS EVERY MORNING
Qty: 15 ML | Refills: 3 | Status: ON HOLD | OUTPATIENT
Start: 2022-04-12 | End: 2022-06-07

## 2022-04-12 RX ORDER — GLUCOSAMINE HCL/CHONDROITIN SU 500-400 MG
CAPSULE ORAL
Qty: 100 EACH | Refills: 3 | Status: SHIPPED | OUTPATIENT
Start: 2022-04-12 | End: 2023-01-21

## 2022-04-12 RX ORDER — PEN NEEDLE, DIABETIC 32GX 5/32"
NEEDLE, DISPOSABLE MISCELLANEOUS
Qty: 100 EACH | Refills: 3 | Status: SHIPPED | OUTPATIENT
Start: 2022-04-12 | End: 2022-04-12

## 2022-04-12 RX ADMIN — LACTULOSE 20 G: 10 SOLUTION ORAL at 08:28

## 2022-04-12 RX ADMIN — Medication 400 MG: at 08:28

## 2022-04-12 RX ADMIN — FUROSEMIDE 40 MG: 40 TABLET ORAL at 08:29

## 2022-04-12 RX ADMIN — SPIRONOLACTONE 100 MG: 25 TABLET, FILM COATED ORAL at 08:28

## 2022-04-12 RX ADMIN — HYDROXYZINE HYDROCHLORIDE 25 MG: 25 TABLET ORAL at 00:02

## 2022-04-12 RX ADMIN — FOLIC ACID 1 MG: 1 TABLET ORAL at 08:28

## 2022-04-12 RX ADMIN — PHYTONADIONE 5 MG: 10 INJECTION, EMULSION INTRAMUSCULAR; INTRAVENOUS; SUBCUTANEOUS at 08:28

## 2022-04-12 RX ADMIN — METRONIDAZOLE 500 MG: 500 TABLET ORAL at 08:28

## 2022-04-12 RX ADMIN — CEFDINIR 300 MG: 300 CAPSULE ORAL at 08:28

## 2022-04-12 RX ADMIN — GABAPENTIN 100 MG: 100 CAPSULE ORAL at 08:28

## 2022-04-12 RX ADMIN — PANTOPRAZOLE SODIUM 40 MG: 20 TABLET, DELAYED RELEASE ORAL at 08:28

## 2022-04-12 RX ADMIN — GABAPENTIN 100 MG: 100 CAPSULE ORAL at 00:02

## 2022-04-12 RX ADMIN — CALCIUM CARBONATE (ANTACID) CHEW TAB 500 MG 500 MG: 500 CHEW TAB at 00:02

## 2022-04-12 RX ADMIN — THERA TABS 1 TABLET: TAB at 08:29

## 2022-04-12 RX ADMIN — Medication 100 MG: at 08:28

## 2022-04-12 ASSESSMENT — ACTIVITIES OF DAILY LIVING (ADL)
ADLS_ACUITY_SCORE: 8

## 2022-04-12 NOTE — PROGRESS NOTES
[facility]    Infectious Disease Progress Note    Date of Service (when I saw the patient): 04/11/2022     Assessment & Plan   Dillon Salter is a 26 year old male who was admitted on 4/6/2022.,  With likley aspiration pneumonia, resulting from waxing waning consciousness, and:    1. Alcoholic liver disease, intoxication  2. Type 2 diabetes, hypertension  3. Cirrhosis jaundice portal hypertension anasarca splenomegaly coagulopathy thrombocytopenia  4. Bleeding from abdominal wound, pressure dressing in place  5. Gallbladder distention thickening    6. Pulmonary infiltrates   1.  Multifocal groundglass consolidative nodular opacities suggestive of multifocal infection. Other considerations could include septic emboli in the appropriate clinical context given the nodular and somewhat peripheral distribution.     NP PCR for resp pathogens negative  DGlucan neg    Recommendations    Pt feels much better, not on oxygen, ambulating  discontinue ceftaz and doxy  Po omnicef and metro x 7 days  Needs followyp CXR in 4 weeks by primary doc  Crypto ag for completion    OK to discharge  tomorrow    Consuelo Wallace M.D.  Visit time 35 min >50% counseling and coordination of care                Interval History   New to me today.  Overall feels a little better.  Cough is nonproductive.    Physical Exam   Temp: 98  F (36.7  C) Temp src: Oral BP: 128/76 Pulse: 97   Resp: 18 SpO2: 91 % O2 Device: None (Room air)    Vitals:    04/09/22 0520 04/10/22 0418 04/11/22 0443   Weight: 70.1 kg (154 lb 9.6 oz) 71.3 kg (157 lb 3.2 oz) 70.5 kg (155 lb 8 oz)     Vital Signs with Ranges  Temp:  [98  F (36.7  C)-99.9  F (37.7  C)] 98  F (36.7  C)  Pulse:  [] 97  Resp:  [16-20] 18  BP: (114-135)/(56-76) 128/76  SpO2:  [91 %-98 %] 91 %  Gen. appearance nontoxic, speaks in full sentences  Eyes no conjunctivitis or icterus  Neck no stiffness   Heart  No edema  Lungs breathing comfortably  Abdomen soft not  tender  Extremities no synovitis, trace edema  Skin  no rash or emboli  Neurologic alert oriented no focal deficits      Medications       cefdinir  300 mg Oral Q12H GARETH     FLUoxetine  20 mg Oral At Bedtime     folic acid  1 mg Oral Daily     furosemide  40 mg Oral Daily     gabapentin  100 mg Oral TID     insulin aspart   Subcutaneous Daily with breakfast     insulin aspart   Subcutaneous Daily with lunch     insulin aspart   Subcutaneous Daily with supper     insulin aspart  1-7 Units Subcutaneous TID AC     insulin aspart  1-5 Units Subcutaneous At Bedtime     [START ON 4/12/2022] insulin glargine  18 Units Subcutaneous QAM AC     lactulose  20 g Oral TID     magnesium oxide  400 mg Oral TID     metroNIDAZOLE  500 mg Oral BID     multivitamin, therapeutic  1 tablet Oral Daily     pantoprazole  40 mg Oral QAM AC     phytonadione  5 mg Oral Daily     sodium chloride (PF)  3 mL Intracatheter Q8H     [START ON 4/12/2022] spironolactone  100 mg Oral Daily     thiamine  100 mg Oral Daily       Data   All microbiology laboratory data reviewed.  Recent Labs   Lab Test 04/10/22  0553 04/09/22  0525 04/08/22  1746 04/08/22  1139 04/08/22  0254   WBC 9.1 8.8  --   --  8.1   HGB 8.3* 8.1* 7.9*   < > 7.3*   HCT 25.6* 25.0*  --   --  22.5*   * 104*  --   --  104*   * 105*  --   --  91*    < > = values in this interval not displayed.     Recent Labs   Lab Test 04/11/22  0426 04/10/22  0553 04/09/22  0525   CR 0.78 0.73 0.66*     RADIOLOGY:  04/06/2022 2334 04/08/2022 0713 Blood Culture Peripheral Blood [[id number]]    Peripheral Blood    Preliminary result Component Value   No component results              04/06/2022 2334 04/08/2022 0932 Blood Culture Peripheral Blood [[id number]]   Peripheral Blood    Preliminary result Component Value   Culture No growth after 1 day P              04/06/2022 2259 04/06/2022 2354 Asymptomatic COVID-19 Virus (Coronavirus) by PCR Nasopharyngeal [[id number]]    Swab from  Nasopharyngeal    Final result Component Value   SARS CoV2 PCR Negative   NEGATIVE: SARS-CoV-2 (COVID-19) RNA not detected, presumed negative.               Echocardiogram Complete    Result Date: 2022  [id number]^MINA^[place] [address]  Name: EYAL ROONEY MRN: [id number] : 1995 Study Date: 2022 08:27 AM Age: 26 yrs Gender: Male Patient Location: [facility] Reason For Study: SOB Ordering Physician: LAUREL ENRIQUEZ Performed By: DANII  BSA: 1.8 m2 Height: 66 in Weight: 148 lb HR: 110 BP: 136/65 mmHg ______________________________________________________________________________ Procedure Complete Portable Echo Adult. ______________________________________________________________________________ Interpretation Summary  Left ventricular size, wall motion and function are normal. The ejection fraction is 60-65%. Normal right ventricle size and systolic function. No hemodynamically significant valvular abnormalities on 2D or color flow imaging. ______________________________________________________________________________ Left Ventricle Left ventricular size, wall motion and function are normal. The ejection fraction is 60-65%. There is normal left ventricular wall thickness. Left ventricular diastolic function is normal. No regional wall motion abnormalities noted.  Right Ventricle Normal right ventricle size and systolic function.  Atria Normal left atrial size. Right atrial size is normal. There is no color Doppler evidence of an atrial shunt.  Mitral Valve Mitral valve leaflets appear normal. There is no evidence of mitral stenosis or clinically significant mitral regurgitation. There is trace mitral regurgitation.  Tricuspid Valve Tricuspid valve leaflets appear normal. Right ventricle systolic pressure estimate normal. There is trace tricuspid regurgitation.  Aortic Valve Aortic valve leaflets appear normal. There is no  evidence of aortic stenosis or clinically significant aortic regurgitation.  Pulmonic Valve The pulmonic valve is not well seen, but is grossly normal. This degree of valvular regurgitation is within normal limits.  Vessels The aorta root is normal. Normal size ascending aorta. IVC diameter >2.1 cm collapsing <50% with sniff suggests a high RA pressure estimated at 15 mmHg or greater.  Pericardium There is no pericardial effusion.  ______________________________________________________________________________ MMode/2D Measurements & Calculations IVSd: 0.95 cm LVIDd: 4.8 cm LVIDs: 2.7 cm LVPWd: 1.1 cm FS: 44.7 %  LV mass(C)d: 176.4 grams LV mass(C)dI: 100.2 grams/m2 Ao root diam: 2.1 cm asc Aorta Diam: 2.3 cm LVOT diam: 1.8 cm LVOT area: 2.5 cm2 LA Volume (BP): 65.4 ml RWT: 0.45  Doppler Measurements & Calculations MV E max luca: 178.0 cm/sec MV A max luca: 145.9 cm/sec MV E/A: 1.2 MV dec slope: 1103 cm/sec2 MV dec time: 0.16 sec Ao V2 max: 207.2 cm/sec Ao max P.0 mmHg Ao V2 mean: 145.5 cm/sec Ao mean P.8 mmHg Ao V2 VTI: 35.0 cm THOR(I,D): 2.4 cm2 THOR(V,D): 2.2 cm2 LV V1 max P.8 mmHg LV V1 max: 185.5 cm/sec LV V1 VTI: 33.7 cm SV(LVOT): 82.7 ml SI(LVOT): 47.0 ml/m2 PA V2 max: 167.0 cm/sec PA max P.1 mmHg PA acc time: 0.17 sec AV Luca Ratio (DI): 0.90 THOR Index (cm2/m2): 1.3  E/E' av.0 Lateral E/e': 11.6 Medial E/e': 14.3  ______________________________________________________________________________ Report approved by: Sofya Saucedo 2022 11:35 AM       US Lower Extremity Venous Duplex Bilateral    Result Date: 2022  EXAM: ULTRASOUND VENOUS BILATERAL LOWER EXTREMITIES WITH DOPPLER LOCATION: [facility] DATE/TIME: 2022 5:07 AM INDICATION: Tachycardia, leg swelling and shortness of breath. COMPARISON: None. TECHNIQUE: Venous Duplex ultrasound of bilateral lower extremities with and without compression, augmentation and duplex. Color flow and spectral  Doppler with waveform analysis performed. FINDINGS: Exam includes the common femoral, femoral, popliteal veins as well as segmentally visualized deep calf veins and greater saphenous vein. RIGHT: Normal compressibility of the common femoral, femoral, popliteal, posterior tibial, peroneal and great saphenous veins. Unremarkable Doppler waveform evaluation of the common femoral, femoral and popliteal veins. LEFT: Normal compressibility of the common femoral, femoral, popliteal, posterior tibial, peroneal and great saphenous veins. Unremarkable Doppler waveform evaluation of the common femoral, femoral and popliteal veins.     IMPRESSION: No evidence of thrombus in the major veins of bilateral lower extremities.         Abdomen US, limited (RUQ only)    Result Date: 4/7/2022  EXAM: US ABDOMEN LIMITED LOCATION: [facility] DATE/TIME: 4/6/2022 11:50 PM INDICATION: Elevated bili, looks like ascites, cirrhosis. COMPARISON: None. TECHNIQUE: Limited abdominal ultrasound. FINDINGS: GALLBLADDER: Gallbladder is distended with wall thickening and tumefactive sludge. BILE DUCTS: No biliary dilatation. The common duct measures 6 mm. LIVER: Hepatomegaly with coarsened hepatic echotexture. RIGHT KIDNEY: No hydronephrosis. PANCREAS: The visualized portions are normal. No significant ascites.     IMPRESSION: 1.  Hepatomegaly with coarsening of hepatic echotexture compatible with cirrhosis. 2.  Gallbladder distention with wall thickening. Wall thickening can be seen with hepatic parenchymal disease. Tumefactive sludge. No bile duct dilatation.     XR Chest Port 1 View    Result Date: 4/7/2022  EXAM: XR CHEST PORT 1 VIEW LOCATION: [facility] DATE/TIME: 4/7/2022 1:19 AM INDICATION: evaluate lung nodules, abnormal CT COMPARISON: 04/27/2013. CT abdomen pelvis from 04/07/2022.     IMPRESSION: Heart size within normal limits. Bilateral central and basilar airspace infiltrates with mild  basilar interstitial prominence. Findings likely reflect a degree of interstitial edema, likely with superimposed pneumonia or pneumonitis. Recommend follow-up chest radiographs to ensure resolution. No pneumothorax.    CT Abdomen Pelvis w Contrast    Result Date: 4/7/2022  EXAM: CT ABDOMEN PELVIS W CONTRAST LOCATION: [facility] DATE/TIME: 4/7/2022 12:37 AM INDICATION: Abdominal distension. COMPARISON: None. TECHNIQUE: CT scan of the abdomen and pelvis was performed following injection of IV contrast. Multiplanar reformats were obtained. Dose reduction techniques were used. CONTRAST: [id number] 80 ml. FINDINGS: LOWER CHEST: Multifocal rounded solid and groundglass nodular infiltrates with superimposed interstitial infiltrates in the visualized lungs. HEPATOBILIARY: Hepatomegaly. Cirrhotic appearance to the liver with trace perihepatic ascites. Gallbladder is distended with wall thickening and sludge. Upper abdominal varices compatible with portal hypertension. PANCREAS: Normal. SPLEEN: Splenomegaly. Splenorenal shunt compatible with portal hypertension. ADRENAL GLANDS: Normal. KIDNEYS/BLADDER: Normal. BOWEL: Normal. LYMPH NODES: Normal. VASCULATURE: Unremarkable. PELVIC ORGANS: Calcification vas deferens compatible with diabetes. Anasarca. MUSCULOSKELETAL: Normal.     IMPRESSION: 1.  Multifocal regions of solid, and groundglass nodularity in the visualized mid and lower lungs with superimposed background of mild interstitial infiltrates. 2.  Cirrhosis with splenomegaly and small volume ascites. Multiple varices compatible with portal hypertension. 3.  Gallbladder is distended with wall thickening which can be seen with parenchymal disease. Sludge within the gallbladder lumen. 4.  Anasarca. 5.  Calcification vas deferens compatible with diabetes.

## 2022-04-12 NOTE — PROGRESS NOTES
[facility] MEDICINE PROGRESS NOTE      Identification/Summary: Dillon Salter is a 26 year old male with a past medical history of Autism/Asperger, DM2, anxiety, alcohol abuse x 8.5 years  who was admitted on 4/6/2022 for alcohol detoxification.  Found to have alcohol intoxication with alcohol level 225.  Was on CIWA protocol for alcohol withdrawal symptoms.  Alcohol withdrawal symptoms are stable.  He has underlying anxiety.  Fluoxetine dose increased to 20 mg daily.  Coagulopathy from chronic liver disease.  Had recent gum bleeding and abdominal skin wall bleeding.  Will get few doses of vitamin K.  Has alcoholic hepatitis, coagulopathy, thrombocytopenia, anasarca.  Meld score 28.  Absolute alcohol cessation is advised.  Will get outpatient CD treatment.  Patient is very positive to quit drinking alcohol.  On Lasix 40 mg daily, spironolactone 100 mg daily for volume overload.  Chest CT scan revealed nodular opacities suggestive of multifocal pneumonia.  Started on IV ceftazidime and doxycycline, and switched to omnicef and metronidazole per ID consult. Oxygen was weaned off and has been stable off oxygen.  He has uncontrolled DM2.  Hemoglobin A1c 7.4.  Was not taking antidiabetic medications.  Started on Lantus and insulin sliding scale.  Diabetic education saw patient and recommended registered Dietician follow up outpatient. Patient will continue daily isulin at home.  Evaluated by chemical dependency, infectious disease, gastroenterology.      Assessment and Plan:  Alcohol abuse, dependence (8.5 years of heavy use)   Alcohol intoxication  Alcohol withdrawal symptoms are improved/resolved   Absolute alcohol cessation advised  Outpatient CD treatment    Alcoholic hepatitis  Portal hypertension  Anasarca  Splenomegaly  Coagulopathy  Hyponatremia  Thrombocytopenia  Meld score 28  Lasix 40 mg daily  Spironolactone 100 mg daily, dose increased  Low sodium diet  Lactulose 20 g 3 times a  day  Follow-up with hepatology as outpatient  EGD as outpatient  Appreciate GI consult     Bilateral nodular lung opacities/multifocal pneumonia  Acute hypoxic respiratory failure, resolved  Sepsis, resolved  Bilateral small pleural effusion secondary to volume overload  Appreciate ID consult  Discontinued IV ceftazidime and doxycycline  Start Omnicef and metronidazole for 7 days      Thrombocytopenia secondary to chronic alcohol use, platelet 109  Anemia, hgb 8.3  Folic acid, thiamine supplement  Total iron 85     DM2  Hemoglobin A1c 7.4  Lantus 20 units daily and insulin sliding scale, 1 unit per 15 g of carbohydrate  Diabetic education  Registered Dietician follow up recommended outpatient  Continue insulin, sliding scale, and carb counting as outpatient. Advised consistency with diabetic management.      Abdominal wall bleeding in the setting of coagulopathy, no active bleeding  Pressure bandages in place   Recent gum bleeding  Vitamin K 5 mg daily for 2 to 3 days     Anxiety disorder  Fluoxetine dose increased to 20 mg daily  Gabapentin 100 mg PO TID - patient would like this ordered on discharge. He will follow up with his PCP for anxiety management.      History of hypertension, not on antihypertensive.  Blood pressure is stable  History of dyslipidemia, not on lipid-lowering medications.  Follow-up with primary MD as outpatient      Diet: Snacks/Supplements Adult: Glucerna; With Meals  Combination Diet Regular Diet; Moderate Consistent Carb (60 g CHO per Meal) Diet; 2 gm NA Diet  Diet  DVT Prophylaxis:  Ambulate every shift  Code Status: Full Code    Anticipated possible discharge today to home with mom.  Disposition Plan   Expected Discharge: 04/12/2022     Anticipated discharge location: home with family    Delays:     *Early Discharge Anticipated         I reviewed and agreed with Kendra Whitney's note and plan. I examined the patient by myself    Elizabeth Henderson MD  [facility]     The patient's care was  discussed with the Attending Physician, Dr. Guaman, Patient and patient's mother.    HERBERT Zuñiga  [facility]  Phone: #[phone]    Interval History/Subjective:  Patient feels better today. States he is motivated to make health improvements and is ready to discharge home. He wants to stop drinking alcohol and improve his diet/diabetes control. States his anxiety will decrease once he is home and out of the hospital. Denies chest pain, or shortness of breath. Denies abdominal pain, nausea, vomiting.     Physical Exam/Objective:  Temp:  [98  F (36.7  C)-99.6  F (37.6  C)] 99.1  F (37.3  C)  Pulse:  [93-98] 97  Resp:  [16-20] 20  BP: (116-138)/(56-76) 138/75  SpO2:  [91 %-99 %] 98 %  Body mass index is 25.61 kg/m .    GENERAL:  Alert, appears comfortable, in no acute distress, appears stated age, anasarca    HEAD:  Normocephalic, without obvious abnormality, atraumatic   EYES:  conjunctiva/corneas clear, slight scleral icterus   NOSE: Nares normal, no drainage   THROAT: Lips, mucosa, and tongue and gums normal   NECK: Supple, symmetrical, trachea midline   BACK:   Symmetric, no curvature, ROM normal   LUNGS:   Clear to auscultation bilaterally, no rales, rhonchi, or wheezing, symmetric chest rise on inhalation, respirations unlabored   CHEST WALL:  No tenderness or deformity   HEART:  Regular rate and rhythm, S1 and S2 normal, no murmur, rub, or gallop    ABDOMEN:   Soft, non-tender, ascites, bowel sounds active all four quadrants, no masses, no organomegaly, no rebound or guarding   EXTREMITIES: 1+ BLE edema   SKIN: Jaundiced    NEURO: Alert, oriented x3, moves all four extremities freely   PSYCH: Cooperative, behavior is appropriate      Data reviewed today: I personally reviewed all new medications, labs, imaging/diagnostics reports over the past 24 hours. Pertinent findings include:    Imaging:   No results found for this or any previous  visit (from the past 24 hour(s)).    Labs:  Most Recent 3 CBC's:Recent Labs   Lab Test 04/10/22  0553 04/09/22  0525 04/08/22  1746 04/08/22  1139 04/08/22  0254   WBC 9.1 8.8  --   --  8.1   HGB 8.3* 8.1* 7.9*   < > 7.3*   * 104*  --   --  104*   * 105*  --   --  91*    < > = values in this interval not displayed.     Most Recent 3 BMP's:Recent Labs   Lab Test 04/12/22  0815 04/12/22  0451 04/11/22 2139 04/11/22 1921 04/11/22  0800 04/11/22  0426 04/10/22  2137 04/10/22  1851 04/10/22  0803 04/10/22  0553 04/09/22  0927 04/09/22  0525   NA  --   --   --   --   --  130*  --   --   --  133*  --  132*   POTASSIUM  --  3.8  --   --   --  3.8  --  3.7  --  3.1*  --  3.5   CHLORIDE  --   --   --   --   --  88*  --   --   --  90*  --  91*   CO2  --   --   --   --   --  29  --   --   --  29  --  29   BUN  --   --   --   --   --  11  --   --   --  10  --  12   CR  --   --   --   --   --  0.78  --   --   --  0.73  --  0.66*   ANIONGAP  --   --   --   --   --  13  --   --   --  14  --  12   SARTHAK  --   --   --   --   --  9.3  --   --   --  9.5  --  9.3   *  --  330* 268*   < > 294*   < >  --    < > 326*   < > 315*    < > = values in this interval not displayed.     Most Recent 6 glucoses:Recent Labs   Lab Test 04/12/22 0815 04/11/22 2139 04/11/22 1921 04/11/22  1718 04/11/22  1306 04/11/22  0800   * 330* 268* 300* 379* 349*       Medications:   Personally Reviewed.  Medications       cefdinir  300 mg Oral Q12H GARETH     FLUoxetine  20 mg Oral At Bedtime     folic acid  1 mg Oral Daily     furosemide  40 mg Oral Daily     gabapentin  100 mg Oral TID     insulin aspart   Subcutaneous Daily with breakfast     insulin aspart   Subcutaneous Daily with lunch     insulin aspart   Subcutaneous Daily with supper     insulin aspart  1-7 Units Subcutaneous TID AC     insulin aspart  1-5 Units Subcutaneous At Bedtime     insulin glargine  25 Units Subcutaneous QAM AC     lactulose  20 g Oral TID     magnesium  oxide  400 mg Oral TID     metroNIDAZOLE  500 mg Oral BID     multivitamin, therapeutic  1 tablet Oral Daily     pantoprazole  40 mg Oral QAM AC     phytonadione  5 mg Oral Daily     sodium chloride (PF)  3 mL Intracatheter Q8H     spironolactone  100 mg Oral Daily     thiamine  100 mg Oral Daily

## 2022-04-12 NOTE — PROGRESS NOTES
"[facility] Dig[facility] Progress Note       SUBJECTIVE:  Patient states he is ready for this journey and is eager to make changes and take care of himself. His LE edema is better than it was this morning. He denies abdominal pain. He is having about 3-4 stools per day. He states he was given the choice to go home today or tomorrow and thinks he is ready to go home today.        OBJECTIVE:  /75 (BP Location: Left arm)   Pulse 97   Temp 99.1  F (37.3  C) (Oral)   Resp 20   Ht 1.676 m (5' 6\")   Wt 72 kg (158 lb 11.2 oz)   SpO2 98%   BMI 25.61 kg/m    Temp (24hrs), Av.2  F (37.3  C), Min:98.5  F (36.9  C), Max:99.9  F (37.7  C)    Patient Vitals for the past 72 hrs:   Weight   22 0629 72 kg (158 lb 11.2 oz)   22 0443 70.5 kg (155 lb 8 oz)   04/10/22 0418 71.3 kg (157 lb 3.2 oz)       Intake/Output Summary (Last 24 hours) at 2022 1057  Last data filed at 2022 1000  Gross per 24 hour   Intake 1080 ml   Output --   Net 1080 ml        PHYSICAL EXAM  GEN: NAD, young male chronically ill-appearing lying in bed  HRT: b/l LE edema  RESP: unlabored  ABD: mildly distended, soft, nontender  SKIN: + jaundice, no rash      Additional Data:  I have reviewed the patient's new clinical lab results:     Recent Labs   Lab Test 22  0426 04/10/22  0553 22  0525 22  1746 22  1139 22  0254   WBC  --  9.1 8.8  --   --  8.1   HGB  --  8.3* 8.1* 7.9*   < > 7.3*   MCV  --  106* 104*  --   --  104*   PLT  --  109* 105*  --   --  91*   INR 1.97*  --  1.82*  --   --  1.85*    < > = values in this interval not displayed.     Recent Labs   Lab Test 22  0451 22  0426 04/10/22  1851 04/10/22  0553 22  0525   POTASSIUM 3.8 3.8 3.7 3.1* 3.5   CHLORIDE  --  88*  --  90* 91*   CO2  --  29  --  29 29   BUN  --  11  --  10 12   ANIONGAP  --  13  --  14 12     Recent Labs   Lab Test 22  0426 04/10/22  0553 22  0525 22  0624 22  2307   ALBUMIN 3.1* 3.1* " 3.0*   < > 3.7   BILITOTAL 14.9* 15.2* 13.9*   < > 11.7*   ALT 75* 84* 89*   < > 131*   * 114* 139*   < > 237*   LIPASE  --   --   --   --  45    < > = values in this interval not displayed.     Acute hepatitis panel 4/7/22: negative  Hemoglobin A1c 4/7/22: 7.4%  MELD Na 4/11/22: 28    Imaging results:  CT chest w/o contrast 4/8/22:  IMPRESSION:   1.  Multifocal groundglass consolidative nodular opacities suggestive of multifocal infection. Other considerations could include septic emboli in the appropriate clinical context given the nodular and somewhat peripheral distribution.  2.  Manifestations of third spacing including pulmonary edema, bilateral effusions, and anasarca.  3.  Partially visualized cirrhosis and portal hypertension.    US LE b/l 4/7/22:  IMPRESSION: No evidence of thrombus in the major veins of bilateral lower extremities    CXR 4/7/22:  IMPRESSION: Heart size within normal limits. Bilateral central and basilar airspace infiltrates with mild basilar interstitial prominence. Findings likely reflect a degree of interstitial edema, likely with superimposed pneumonia or pneumonitis.   Recommend follow-up chest radiographs to ensure resolution. No pneumothorax.    CT abdomen/pelvis 4/7/22:  IMPRESSION:   1.  Multifocal regions of solid, and groundglass nodularity in the visualized mid and lower lungs with superimposed background of mild interstitial infiltrates.  2.  Cirrhosis with splenomegaly and small volume ascites. Multiple varices compatible with portal hypertension.  3.  Gallbladder is distended with wall thickening which can be seen with parenchymal disease. Sludge within the gallbladder lumen.  4.  Anasarca.  5.  Calcification vas deferens compatible with diabetes.    RUQ ultrasound 4/6/22:  FINDINGS:  GALLBLADDER: Gallbladder is distended with wall thickening and tumefactive sludge.  BILE DUCTS: No biliary dilatation. The common duct measures 6 mm.  LIVER: Hepatomegaly with coarsened  hepatic echotexture.  RIGHT KIDNEY: No hydronephrosis.  PANCREAS: The visualized portions are normal.  No significant ascites.                                                                   IMPRESSION:  1.  Hepatomegaly with coarsening of hepatic echotexture compatible with cirrhosis.  2.  Gallbladder distention with wall thickening. Wall thickening can be seen with hepatic parenchymal disease. Tumefactive sludge. No bile duct dilatation.       IMPRESSION:  Alcoholic cirrhosis with ascites  Alcoholic hepatitis  Hepatic Encephalopathy  Pneumonia  27 y/o male with PMH Asperger's, alcohol abuse, anxiety/depression admitted 4/6 for new diagnosis of cirrhosis seen on imaging with alcoholic hepatitis and signs of portal hypertension, coagulopathy, and hepatic encephalopathy and pneumonia. MELD Na 25 on admission and 28 4/11. Liver tests improving. No steroids for alc hep given pneumonia. He is on low dose diuretics, potassium low initially but WNL now. He is tolerating low sodium, diabetic diet. LE edema continues, not sufficient abdominal ascites for tap so unlikely he has SBP. He is having adequate bowel movements on increased dose of lactulose without signs of HE. No overt GI bleeding. Hemoglobin is low but has been stable. He is on abx for PNA. On CIWA protocol. He states he is open to outpatient treatment but not inpatient.    PLAN:  - Low sodium, diabetic diet- recommended good amount of protein to hopefully help with muscle wasting  - Continue lactulose 20 g TID with increased dose as needed for confusion in the future  - Continue furosemide 40 mg daily and spironolactone to 100 mg daily, monitor electrolytes and creatinine per medicine  - Alcohol treatment program encouraged, complete alcohol cessation needed  - Outpatient Hepatology follow up ([facility] will call pt to arrange) with eventual EGD  - Okay to discharge today per GI      (Dr. Jones)  Cece Heaton PA-C  [facility]  4/12/2022 12:49  PM  [phone] (office)  ________________________________________________________________________

## 2022-04-12 NOTE — DISCHARGE SUMMARY
[facility] MEDICINE  DISCHARGE SUMMARY     Primary Care Physician: Gary Rodrigues  Admission Date: 4/6/2022   Discharge Provider: Elizabeth Guaman MD Discharge Date: 4/12/2022   Diet:   Diabetic, 2 g sodium diet   Code Status: Full Code   Activity: DCACTIVITY: Activity as tolerated        Condition at Discharge: Stable     REASON FOR PRESENTATION(See Admission Note for Details)   Alcohol intoxication    PRINCIPAL & ACTIVE DISCHARGE DIAGNOSES   Alcohol abuse, dependence ( 8.5 years of heavy use)   Alcohol intoxication  Alcoholic hepatitis  Portal hypertension  Anasarca  Splenomegaly  Coagulopathy  Hyponatremia  Thrombocytopenia, platelet 109  Bilateral nodular lung opacities/multifocal pneumonia  Acute hypoxic respiratory failure, resolved  Sepsis, resolved  Bilateral small pleural effusion secondary to volume overload  Anemia with hemoglobin 8.3  DM2, hemoglobin A1c 7.4   Abdominal wall bleeding in the setting of coagulopathy, resolved  Gum bleeding, resolved  Severe Protein-Calorie Malnutrition due to chronic illness      PENDING LABS     Unresulted Labs Ordered in the Past 30 Days of this Admission     Date and Time Order Name Status Description    4/12/2022 12:01 AM Cryptococcus antigen In process     4/9/2022 10:04 AM Blood Culture Peripheral Blood Preliminary     4/9/2022 10:04 AM Blood Culture Peripheral Blood Preliminary     4/9/2022 12:01 AM Histoplasma Karen by Immunodiffusion In process     4/9/2022 12:01 AM Histoplasma Capsulatum Antigen In process     4/9/2022 12:01 AM Histoplasma Antigen Quantitative by EIA In process     4/9/2022 12:01 AM Blastomyces antibody ID In process     4/9/2022 12:01 AM Fungal Antibodies In process     4/8/2022 12:01 AM Anti Nuclear Karen IgG by IFA with Reflex In process     4/6/2022 11:21 PM Blood Culture Peripheral Blood Preliminary     4/6/2022 11:21 PM Blood Culture Peripheral Blood Preliminary             PROCEDURES ( this  hospitalization only)      None    RECOMMENDATIONS TO OUTPATIENT PROVIDER FOR F/U VISIT     Follow-up with primary MD with blood glucose reading in 3 to 5-day  Follow-up with endocrinology in 2-week  Continue diabetic education  Outpatient CD treatment,  provided resources  Follow-up with [place] [facility] clinic in 2 weeks  Chest x-ray in 1 month for follow-up of pneumonia      DISPOSITION     Home    SUMMARY OF HOSPITAL COURSE:      Mr.Michael MANISH Salter is a 26 year old male with a past medical history of Autism/Asperger, DM2, anxiety, alcohol abuse x 8.5 years  who was admitted on 4/6/2022 for alcohol detoxification.  He was severely ill with alcoholic hepatitis, coagulopathy, elevated lactic acid, anasarca, pneumonia. CT scan revealed nodular opacities suggestive of multifocal pneumonia.  Likely aspiration pneumonia.  Symptoms are markedly improved with IV ceftazidime and doxycycline.  Discharging home with Cefdinir and metronidazole for 1 week.  Oxygen is able to wean off.  Follow-up chest x-ray in a week.    Found to have alcohol intoxication with alcohol level 225.  Was on CIWA protocol for alcohol withdrawal symptoms.  Alcohol withdrawal symptoms are resolved.  He has underlying anxiety.  Fluoxetine dose increased to 20 mg daily.  Will take gabapentin 100 mg daily for next 1 week. Had recent gum bleeding and abdominal skin wall bleeding secondary to coagulopathy from chronic liver disease. Got 2 doses of  vitamin K.  Develops alcoholic hepatitis, coagulopathy, thrombocytopenia, anasarca.  Meld score 28.  Absolute alcohol cessation is advised. Will get outpatient CD treatment.  Patient is very positive to quit drinking alcohol.  On Lasix 40 mg daily, spironolactone 100 mg daily for volume overload.  Continue 2 g sodium diet.  Started on lactulose 20 g 3 times a day.  Evaluated by gastroenterology.  Will have outpatient EGD.    He has uncontrolled DM 2.  Was not taking Lantus for a while.   Lantus restarted 25 unit daily, NovoLog 5 units 3 times a day with meal and 1 unit per 15 g carbohydrate counting.  Patient was on 70 unit Lantus in the past. Patient and his mother are well educated for insulin administration. Follow-up with primary care physician with blood glucose reading in a week. Will continue diabetic education as outpatient.  Follow-up with endocrinology as outpatient.      Discharge Medications with Med changes:     Current Discharge Medication List      START taking these medications    Details   alcohol swab prep pads Use to swab area of injection/marsha as directed.  Qty: 100 each, Refills: 3    Associated Diagnoses: Diabetes mellitus type 2 in obese (H)      blood glucose (NO BRAND SPECIFIED) test strip Use to test blood sugar 4 times daily or as directed. To accompany: Blood Glucose Monitor Brands: per insurance.  Qty: 100 strip, Refills: 6    Associated Diagnoses: Diabetes mellitus type 2 in obese (H)      blood glucose monitoring (NO BRAND SPECIFIED) meter device kit Use to test blood sugar 4 times daily or as directed. Preferred blood glucose meter OR supplies to accompany: Blood Glucose Monitor Brands: per insurance.  Qty: 1 kit, Refills: 0    Associated Diagnoses: Diabetes mellitus type 2 in obese (H)      cefdinir (OMNICEF) 300 MG capsule Take 1 capsule (300 mg) by mouth in the morning and 1 capsule (300 mg) in the evening. Do all this for 7 days.  Qty: 14 capsule, Refills: 0    Associated Diagnoses: Pneumonia due to infectious organism, unspecified laterality, unspecified part of lung      folic acid (FOLVITE) 1 MG tablet Take 1 tablet (1 mg) by mouth in the morning.  Qty: 30 tablet, Refills: 0    Associated Diagnoses: Alcoholic cirrhosis of liver with ascites (H)      furosemide (LASIX) 40 MG tablet Take 1 tablet (40 mg) by mouth daily  Qty: 30 tablet, Refills: 3    Associated Diagnoses: Alcoholic cirrhosis of liver with ascites (H)      gabapentin (NEURONTIN) 100 MG capsule  Take 1 capsule (100 mg) by mouth At Bedtime  Qty: 7 capsule, Refills: 0    Associated Diagnoses: Alcohol withdrawal, uncomplicated (H)      insulin aspart (NOVOLOG FLEXPEN) 100 UNIT/ML pen 5 units tid with meals and 1 unit per 15 unit of carbohydrate counting  Qty: 15 mL, Refills: 3    Associated Diagnoses: Diabetes mellitus type 2 in obese (H)      insulin glargine (LANTUS SOLOSTAR) 100 UNIT/ML pen Inject 25 Units Subcutaneous every morning  Qty: 15 mL, Refills: 3    Comments: If Lantus is not covered by insurance, may substitute Basaglar or Semglee or other insulin glargine product per insurance preference at same dose and frequency.    Associated Diagnoses: Diabetes mellitus type 2 in obese (H)      insulin pen needle (ULTICARE MICRO) 32G X 4 MM miscellaneous Use pen needles daily or as directed.  Qty: 100 each, Refills: 3    Associated Diagnoses: Diabetes mellitus type 2 in obese (H)      lactulose (CHRONULAC) 10 GM/15ML solution Take 30 mLs (20 g) by mouth 3 times daily  Qty: 1892 mL, Refills: 1    Associated Diagnoses: Alcoholic cirrhosis of liver with ascites (H)      magnesium oxide (MAG-OX) 400 MG tablet Take 1 tablet (400 mg) by mouth in the morning and 1 tablet (400 mg) in the evening.  Qty: 60 tablet, Refills: 0    Associated Diagnoses: Alcoholic cirrhosis of liver with ascites (H)      metroNIDAZOLE (FLAGYL) 500 MG tablet Take 1 tablet (500 mg) by mouth 2 times daily for 7 days  Qty: 14 tablet, Refills: 0    Associated Diagnoses: Pneumonia due to infectious organism, unspecified laterality, unspecified part of lung      multivitamin, therapeutic (THERA-VIT) TABS tablet Take 1 tablet by mouth in the morning.    Associated Diagnoses: Alcoholic cirrhosis of liver with ascites (H)      pantoprazole (PROTONIX) 40 MG EC tablet Take 1 tablet (40 mg) by mouth every morning (before breakfast)  Qty: 30 tablet, Refills: 0    Associated Diagnoses: Alcohol intoxication with delirium (H)      spironolactone  (ALDACTONE) 100 MG tablet Take 1 tablet (100 mg) by mouth daily  Qty: 30 tablet, Refills: 3    Associated Diagnoses: Alcoholic cirrhosis of liver with ascites (H)      thiamine (B-1) 100 MG tablet Take 1 tablet (100 mg) by mouth in the morning.  Qty: 30 tablet, Refills: 0    Associated Diagnoses: Alcoholic cirrhosis of liver with ascites (H)      thin (NO BRAND SPECIFIED) lancets Use with lanceting device. To accompany: Blood Glucose Monitor Brands: per insurance.  Qty: 100 each, Refills: 6    Associated Diagnoses: Diabetes mellitus type 2 in obese (H)         CONTINUE these medications which have CHANGED    Details   FLUoxetine (PROZAC) 20 MG capsule Take 1 capsule (20 mg) by mouth At Bedtime  Qty: 30 capsule, Refills: 0    Associated Diagnoses: Anxiety         CONTINUE these medications which have NOT CHANGED    Details   hydrOXYzine (ATARAX) 25 MG tablet Take 25 mg by mouth every 8 hours as needed for anxiety                  Rationale for medication changes:      Fluoxetine was increased  Lasix, spironolactone, lactulose are started for chronic liver disease and anasarca  Cefdinir and metronidazole for aspiration pneumonia  Lantus and NovoLog insulin for DM2        Consults   Pulmonology  ID    Immunizations given this encounter       There is no immunization history on file for this patient.        Anticoagulation Information      None    SIGNIFICANT IMAGING FINDINGS     Results for orders placed or performed during the hospital encounter of 04/06/22   CT Abdomen Pelvis w Contrast    Impression    IMPRESSION:   1.  Multifocal regions of solid, and groundglass nodularity in the visualized mid and lower lungs with superimposed background of mild interstitial infiltrates.    2.  Cirrhosis with splenomegaly and small volume ascites. Multiple varices compatible with portal hypertension.    3.  Gallbladder is distended with wall thickening which can be seen with parenchymal disease. Sludge within the gallbladder  lumen.    4.  Anasarca.    5.  Calcification vas deferens compatible with diabetes.   Abdomen US, limited (RUQ only)    Impression    IMPRESSION:  1.  Hepatomegaly with coarsening of hepatic echotexture compatible with cirrhosis.    2.  Gallbladder distention with wall thickening. Wall thickening can be seen with hepatic parenchymal disease. Tumefactive sludge. No bile duct dilatation.       XR Chest Port 1 View    Impression    IMPRESSION: Heart size within normal limits. Bilateral central and basilar airspace infiltrates with mild basilar interstitial prominence. Findings likely reflect a degree of interstitial edema, likely with superimposed pneumonia or pneumonitis.   Recommend follow-up chest radiographs to ensure resolution. No pneumothorax.   US Lower Extremity Venous Duplex Bilateral    Impression    IMPRESSION: No evidence of thrombus in the major veins of bilateral lower extremities.              CT Chest w/o Contrast    Impression    IMPRESSION:   1.  Multifocal groundglass consolidative nodular opacities suggestive of multifocal infection. Other considerations could include septic emboli in the appropriate clinical context given the nodular and somewhat peripheral distribution.  2.  Manifestations of third spacing including pulmonary edema, bilateral effusions, and anasarca.  3.  Partially visualized cirrhosis and portal hypertension.     Echocardiogram Complete   Result Value Ref Range    LVEF  60-65%    Left ventricular size, wall motion and function are normal. The ejection  fraction is 60-65%.  Normal right ventricle size and systolic function.  No hemodynamically significant valvular abnormalities on 2D or color flow  imaging.    SIGNIFICANT LABORATORY FINDINGS     INR 1.97  Phosphorus 2.5  Hemoglobin A1c 7.4  , ALT 75, albumin 3.1  Hemoglobin 8.3, platelet 109  Discharge Orders        Adult Endocrinology  Referral      Adult Gastro Ref - Consult Only      Reason for your hospital  stay    Alcohol intoxication found to have hyperglycemia     Follow-up and recommended labs and tests     Follow-up with primary MD with blood glucose reading in 3 to 5-day  Follow-up with endocrinology in 2-week  Continue diabetic education  Outpatient CD treatment,  provided resources  Follow-up with [place] [facility] clinic in 2 weeks  Chest x-ray in 1 month for follow-up of pneumonia     Activity    Your activity upon discharge: activity as tolerated     Diet    Follow this diet upon discharge: 2 g salt diet       Examination   Physical Exam   Temp:  [98  F (36.7  C)-99.6  F (37.6  C)] 99.1  F (37.3  C)  Pulse:  [93-98] 97  Resp:  [16-20] 20  BP: (116-138)/(56-76) 138/75  SpO2:  [91 %-99 %] 98 %  Wt Readings from Last 1 Encounters:   04/12/22 72 kg (158 lb 11.2 oz)     GENERAL:  Alert, appears comfortable, in no acute distress, appears stated age, anxious, anasarca   HEAD:  Normocephalic, without obvious abnormality, atraumatic   EYES:   conjunctiva/corneas clear, scleral icterus   NOSE: Nares normal, no drainage   THROAT: Lips, mucosa, and gums normal, mouth moist   NECK: Supple, symmetrical, trachea midline   BACK:   Symmetric, no curvature, ROM normal   LUNGS:   CTA - lung sounds improved, no rales, rhonchi, or wheezing, symmetric chest rise on inhalation, respirations unlabored   CHEST WALL:  No tenderness or deformity   HEART:  Regular rate and rhythm, S1 and S2 normal, no murmur, rub, or gallop    ABDOMEN:   Soft, non-tender, ascites, bowel sounds active all four quadrants, no masses, no organomegaly, no rebound or guarding   EXTREMITIES: 1+ BLE edema   SKIN: Jaundiced   NEURO: Alert, oriented x3, moves all four extremities freely   PSYCH: Cooperative, behavior is appropriate            Please see EMR for more detailed significant labs, imaging, consultant notes etc.    Elizabeth WOOD MD, personally saw the patient today and spent greater than 30 minutes discharging this  patient.    Elizabeth Guaman MD  [facility]    CC:Gary Rodrigues

## 2022-04-12 NOTE — PLAN OF CARE
Goal Outcome Evaluation:      Problem: Physiologic Impairment (Excessive Substance Use)  Goal: Improved Physiologic Symptoms (Excessive Substance Use)  Outcome: Ongoing, Progressing     Problem: Social, Occupational or Functional Impairment (Excessive Substance Use)    Patient denies pain.  CIWA scores 3, 3, and 5.  No treatment indicated.  Patient ambulating in room independently.  Vitals stable.  Patient continues to be anxious.  Tele: NSR.

## 2022-04-12 NOTE — PROGRESS NOTES
Care Management Discharge Note    Discharge Date: 04/12/2022       Discharge Disposition: Home, Outpatient Chemical Dependency    Discharge Services: None    Discharge DME: None    Discharge Transportation: family or friend will provide    Private pay costs discussed: Not applicable    PAS Confirmation Code:  (NA)  Patient/family educated on Medicare website which has current facility and service quality ratings:  (NA)    Education Provided on the Discharge Plan:  yes  Persons Notified of Discharge Plans: patient and parents  Patient/Family in Agreement with the Plan: yes    Handoff Referral Completed: not applicable at this time     Additional Information:  Pt completed Rule 25 assessment yesterday, 4/11 and is utilizing [facility] outpatient services.  Family to transport.       ALEXANDER Hawkins

## 2022-04-12 NOTE — PROGRESS NOTES
Patient discharge instructions reviewed with patient and mother.  All questions answered.  Given phone number to the unit if any other questions arose. IV removed and patient was discharged to home with his mother.

## 2022-04-12 NOTE — PLAN OF CARE
Occupational Therapy Discharge Summary    Reason for therapy discharge:    Discharged to home.    Progress towards therapy goal(s). See goals on Care Plan in [facility] electronic health record for goal details.  Goals partially met.  Barriers to achieving goals:   discharge from facility.    Therapy recommendation(s):    No further therapy is recommended.

## 2022-04-12 NOTE — PLAN OF CARE
"  Problem: Plan of Care - These are the overarching goals to be used throughout the patient stay.    Goal: Plan of Care Review/Shift Note  Description: The Plan of Care Review/Shift note should be completed every shift.  The Outcome Evaluation is a brief statement about your assessment that the patient is improving, declining, or no change.  This information will be displayed automatically on your shift note.  4/12/2022 1241 by Vicky Romero RN  Outcome: Met  4/12/2022 1240 by Vicky Romero RN  Outcome: Met  Flowsheets (Taken 4/12/2022 1240)  Plan of Care Reviewed With:   patient   mother  Outcome Evaluation: discharging  Overall Patient Progress: improving  Goal: Patient-Specific Goal (Individualized)  Description: You can add care plan individualizations to a care plan. Examples of Individualization might be:  \"Parent requests to be called daily at 9am for status\", \"I have a hard time hearing out of my right ear\", or \"Do not touch me to wake me up as it startles me\".  4/12/2022 1241 by Vicky Romero RN  Outcome: Met  4/12/2022 1240 by Vicky Romero RN  Outcome: Met  Goal: Absence of Hospital-Acquired Illness or Injury  4/12/2022 1241 by Vicky Romero RN  Outcome: Met  4/12/2022 1240 by Vicky Romero RN  Outcome: Met  Intervention: Identify and Manage Fall Risk  Recent Flowsheet Documentation  Taken 4/12/2022 0800 by Vicky Romero RN  Safety Promotion/Fall Prevention:   assistive device/personal items within reach   nonskid shoes/slippers when out of bed  Intervention: Prevent Skin Injury  Recent Flowsheet Documentation  Taken 4/12/2022 0800 by Vicky Romero RN  Body Position: position changed independently  Intervention: Prevent and Manage VTE (Venous Thromboembolism) Risk  Recent Flowsheet Documentation  Taken 4/12/2022 0800 by Vicky Romero RN  Activity Management: activity adjusted per tolerance  Intervention: Prevent Infection  Recent Flowsheet Documentation  Taken 4/12/2022 " 0800 by Vicky Romero RN  Infection Prevention: rest/sleep promoted  Goal: Optimal Comfort and Wellbeing  4/12/2022 1241 by Vicky Romero RN  Outcome: Met  4/12/2022 1240 by Vicky Romero RN  Outcome: Met  Goal: Readiness for Transition of Care  4/12/2022 1241 by Vicky Romero RN  Outcome: Met  4/12/2022 1240 by Vicky Romero RN  Outcome: Met     Problem: Activity and Energy Impairment (Excessive Substance Use)  Goal: Optimized Energy Level (Excessive Substance Use)  4/12/2022 1241 by Vicky Romero RN  Outcome: Met  4/12/2022 1240 by Vicky Romero RN  Outcome: Met     Problem: Behavior Regulation Impairment (Excessive Substance Use)  Goal: Improved Behavioral Control (Excessive Substance Use)  4/12/2022 1241 by Vicky Romero RN  Outcome: Met  4/12/2022 1240 by Vicky Romero RN  Outcome: Met     Problem: Decreased Participation and Engagement (Excessive Substance Use)  Goal: Increased Participation and Engagement (Excessive Substance Use)  4/12/2022 1241 by Vicky Romero RN  Outcome: Met  4/12/2022 1240 by Vicky Romero RN  Outcome: Met     Problem: Physiologic Impairment (Excessive Substance Use)  Goal: Improved Physiologic Symptoms (Excessive Substance Use)  4/12/2022 1241 by Vicky Romero RN  Outcome: Met  4/12/2022 1240 by Vicky Romero RN  Outcome: Met     Problem: Social, Occupational or Functional Impairment (Excessive Substance Use)  Goal: Enhanced Social, Occupational or Functional Skills (Excessive Substance Use)  4/12/2022 1241 by Vicky Romero RN  Outcome: Met  4/12/2022 1240 by Vicky Romero RN  Outcome: Met   Goal Outcome Evaluation:    Plan of Care Reviewed With: patient, mother     Overall Patient Progress: improving    Outcome Evaluation: discharging

## 2022-04-13 ENCOUNTER — PATIENT OUTREACH (OUTPATIENT)
Dept: CARE COORDINATION | Facility: CLINIC | Age: 27
End: 2022-04-13
Payer: COMMERCIAL

## 2022-04-13 DIAGNOSIS — Z71.89 OTHER SPECIFIED COUNSELING: ICD-10-CM

## 2022-04-13 NOTE — PROGRESS NOTES
Clinic Care Coordination Contact  [facility]/Voicemail       Clinical Data: Care Coordinator Outreach  Outreach attempted x 1.  Left message on patient's voicemail with call back information and requested return call.  Plan: Care Coordinator will try to reach patient again in 1-2 business days.    ALEXANDER Pinon  [phone]  [facility]

## 2022-04-14 LAB
ASPERGILLUS AB TITR SER CF: ABNORMAL {TITER}
B DERMAT AB SER-ACNC: 2.4 IV
BACTERIA BLD CULT: NO GROWTH
BACTERIA BLD CULT: NO GROWTH
COCCIDIOIDES AB TITR SER CF: ABNORMAL {TITER}
H CAPSUL MYC AB TITR SER CF: ABNORMAL {TITER}
H CAPSUL YST AB TITR SER CF: ABNORMAL {TITER}
SCANNED LAB RESULT: NORMAL

## 2022-04-14 NOTE — PROGRESS NOTES
Clinic Care Coordination Contact  [facility]/Voicemail       Clinical Data: Care Coordinator Outreach  Outreach attempted x 2.  Left message on patient's voicemail with call back information and requested return call.    Plan: Care Coordinator will do no further outreaches at this time.    ALEXANDER Pinon  [phone]  [facility]

## 2022-04-16 LAB — B DERMAT AB SER QL ID: NORMAL

## 2022-04-19 LAB — SCANNED LAB RESULT: NORMAL

## 2022-04-22 LAB — BACTERIA BLD CULT: NO GROWTH

## 2022-04-25 NOTE — TELEPHONE ENCOUNTER
RECORDS RECEIVED FROM: Internal   Appt Date: 05.05.2022   NOTES STATUS DETAILS   OFFICE NOTE from referring provider Internal 04.06.2022 Elizabeth Guaman MD   OFFICE NOTES from other specialists     DISCHARGE SUMMARY from hospital Internal 04.06.2022 Elizabeth Guaman MD   MEDICATION LIST Internal    LIVER BIOSPY (IF APPLICABLE)      PATHOLOGY REPORTS      IMAGING     ENDOSCOPY (IF AVAILABLE)     COLONOSCOPY (IF AVAILABLE)     ULTRASOUND LIVER Internal 04.06.2022 US ABDOMEN LIMITED   CT OF ABDOMEN Internal 04.07.2022 CT ABDOMEN PELVIS W CONTRAST   MRI OF LIVER     FIBROSCAN, US ELASTOGRAPHY, FIBROSIS SCAN, MR ELASTOGRAPHY     LABS     HEPATIC PANEL (LIVER PANEL) Internal 04.06.2022   BASIC METABOLIC PANEL Internal 04.06.2022   COMPLETE METABOLIC PANEL Internal 04.10.2022   COMPLETE BLOOD COUNT (CBC) Internal 04.10.2022   INTERNATIONAL NORMALIZED RATIO (INR) Internal 04.11.2022   HEPATITIS C ANTIBODY     HEPATITIS C VIRAL LOAD/PCR     HEPATITIS C GENOTYPE     HEPATITIS B SURFACE ANTIGEN     HEPATITIS B SURFACE ANTIBODY     HEPATITIS B DNA QUANT LEVEL     HEPATITIS B CORE ANTIBODY

## 2022-04-26 DIAGNOSIS — R79.89 ELEVATED LFTS: Primary | ICD-10-CM

## 2022-05-05 ENCOUNTER — OFFICE VISIT (OUTPATIENT)
Dept: GASTROENTEROLOGY | Facility: CLINIC | Age: 27
End: 2022-05-05
Attending: FAMILY MEDICINE
Payer: COMMERCIAL

## 2022-05-05 ENCOUNTER — PRE VISIT (OUTPATIENT)
Dept: GASTROENTEROLOGY | Facility: CLINIC | Age: 27
End: 2022-05-05

## 2022-05-05 ENCOUNTER — LAB (OUTPATIENT)
Dept: LAB | Facility: CLINIC | Age: 27
End: 2022-05-05
Payer: COMMERCIAL

## 2022-05-05 ENCOUNTER — LAB (OUTPATIENT)
Dept: LAB | Facility: CLINIC | Age: 27
End: 2022-05-05
Attending: FAMILY MEDICINE
Payer: COMMERCIAL

## 2022-05-05 VITALS
BODY MASS INDEX: 19.37 KG/M2 | TEMPERATURE: 97.8 F | RESPIRATION RATE: 16 BRPM | SYSTOLIC BLOOD PRESSURE: 128 MMHG | WEIGHT: 120 LBS | OXYGEN SATURATION: 98 % | HEART RATE: 110 BPM | DIASTOLIC BLOOD PRESSURE: 80 MMHG

## 2022-05-05 DIAGNOSIS — R79.89 ELEVATED LFTS: ICD-10-CM

## 2022-05-05 DIAGNOSIS — K70.31 ALCOHOLIC CIRRHOSIS OF LIVER WITH ASCITES (H): ICD-10-CM

## 2022-05-05 LAB
AFP SERPL-MCNC: 5.6 UG/L (ref 0–8)
ALBUMIN SERPL-MCNC: 3.1 G/DL (ref 3.4–5)
ALP SERPL-CCNC: 346 U/L (ref 40–150)
ALT SERPL W P-5'-P-CCNC: 113 U/L (ref 0–70)
ANION GAP SERPL CALCULATED.3IONS-SCNC: 9 MMOL/L (ref 3–14)
AST SERPL W P-5'-P-CCNC: 120 U/L (ref 0–45)
BILIRUB DIRECT SERPL-MCNC: 6.9 MG/DL (ref 0–0.2)
BILIRUB SERPL-MCNC: 14.5 MG/DL (ref 0.2–1.3)
BUN SERPL-MCNC: 31 MG/DL (ref 7–30)
CALCIUM SERPL-MCNC: 10.2 MG/DL (ref 8.5–10.1)
CHLORIDE BLD-SCNC: 82 MMOL/L (ref 94–109)
CO2 SERPL-SCNC: 32 MMOL/L (ref 20–32)
CREAT SERPL-MCNC: 0.64 MG/DL (ref 0.66–1.25)
ERYTHROCYTE [DISTWIDTH] IN BLOOD BY AUTOMATED COUNT: 17.2 % (ref 10–15)
GFR SERPL CREATININE-BSD FRML MDRD: >90 ML/MIN/1.73M2
GLUCOSE BLD-MCNC: 545 MG/DL (ref 70–99)
HCT VFR BLD AUTO: 24.6 % (ref 40–53)
HGB BLD-MCNC: 8.4 G/DL (ref 13.3–17.7)
INR PPP: 1.93 (ref 0.85–1.15)
MCH RBC QN AUTO: 36.5 PG (ref 26.5–33)
MCHC RBC AUTO-ENTMCNC: 34.1 G/DL (ref 31.5–36.5)
MCV RBC AUTO: 107 FL (ref 78–100)
PLATELET # BLD AUTO: 78 10E3/UL (ref 150–450)
POTASSIUM BLD-SCNC: 5 MMOL/L (ref 3.4–5.3)
PROT SERPL-MCNC: 10.3 G/DL (ref 6.8–8.8)
RBC # BLD AUTO: 2.3 10E6/UL (ref 4.4–5.9)
SODIUM SERPL-SCNC: 123 MMOL/L (ref 133–144)
WBC # BLD AUTO: 18.2 10E3/UL (ref 4–11)

## 2022-05-05 PROCEDURE — 87350 HEPATITIS BE AG IA: CPT | Mod: 90 | Performed by: PATHOLOGY

## 2022-05-05 PROCEDURE — 85027 COMPLETE CBC AUTOMATED: CPT | Performed by: PATHOLOGY

## 2022-05-05 PROCEDURE — 86803 HEPATITIS C AB TEST: CPT | Mod: 90 | Performed by: PATHOLOGY

## 2022-05-05 PROCEDURE — 80307 DRUG TEST PRSMV CHEM ANLYZR: CPT | Mod: 90 | Performed by: PATHOLOGY

## 2022-05-05 PROCEDURE — 82248 BILIRUBIN DIRECT: CPT | Performed by: PATHOLOGY

## 2022-05-05 PROCEDURE — 86706 HEP B SURFACE ANTIBODY: CPT | Mod: 90 | Performed by: PATHOLOGY

## 2022-05-05 PROCEDURE — 86707 HEPATITIS BE ANTIBODY: CPT | Mod: 90 | Performed by: PATHOLOGY

## 2022-05-05 PROCEDURE — 80053 COMPREHEN METABOLIC PANEL: CPT | Performed by: PATHOLOGY

## 2022-05-05 PROCEDURE — 99000 SPECIMEN HANDLING OFFICE-LAB: CPT | Performed by: PATHOLOGY

## 2022-05-05 PROCEDURE — 99205 OFFICE O/P NEW HI 60 MIN: CPT | Mod: GC | Performed by: INTERNAL MEDICINE

## 2022-05-05 PROCEDURE — 86704 HEP B CORE ANTIBODY TOTAL: CPT | Mod: 90 | Performed by: PATHOLOGY

## 2022-05-05 PROCEDURE — 36415 COLL VENOUS BLD VENIPUNCTURE: CPT | Performed by: PATHOLOGY

## 2022-05-05 PROCEDURE — 85610 PROTHROMBIN TIME: CPT | Performed by: PATHOLOGY

## 2022-05-05 PROCEDURE — 87340 HEPATITIS B SURFACE AG IA: CPT | Mod: 90 | Performed by: PATHOLOGY

## 2022-05-05 PROCEDURE — 82105 ALPHA-FETOPROTEIN SERUM: CPT | Mod: 90 | Performed by: PATHOLOGY

## 2022-05-05 ASSESSMENT — PAIN SCALES - GENERAL: PAINLEVEL: NO PAIN (0)

## 2022-05-05 ASSESSMENT — PATIENT HEALTH QUESTIONNAIRE - PHQ9: SUM OF ALL RESPONSES TO PHQ QUESTIONS 1-9: 14

## 2022-05-05 NOTE — LETTER
5/5/2022         RE: Dillon Salter  [address]  [place] [address]        Dear Colleague,    Thank you for referring your patient, Dillon Salter, to the [facility] [place]. Please see a copy of my visit note below.    Date of Service: 5/5/2022     Referring Provider: Elizabeth Guaman MD    Subjective:            Dillon Salter is a 26 year old male presenting for evaluation of liver disease    - Etiology: ETOH hepatitis  - HCC screening Last Ct abd April 2022    History of Present Illness   Dillon Salter is a 26 year old male with past medical history of autism/Asperger's, diabetes type 2, anxiety, alcohol use disorder who is being consulted.    Patient was recently admitted to the hospital on 4/6/2022 for alcoholic hepatitis, also had aspiration pneumonia during the hospital stay.    Since the hospital discharge he stated once in a while he gets bright red blood per rectum especially upon wiping with some dripping into the toilet bowl.  No melena, no ascites or abdominal pain.  He has been becoming very weak with disturbance of sleep pattern at night.      Patient has diabetes and is on insulin but stopped using it 1-1/2 years back and is now back since the hospital discharge.  His blood glucose control is very poor and he has been feeling thirsty all the time.He has lost weight since the last 1-1/2-year.      He started drinking at the age of 19 which initially was occasional over the weekend then started increasing till 2018 and from 2018 till now it has been a case of beer plus a bottle of wine every day to every other day he goes to the liquor store every other day.  Smokes cigarettes occasionally, no marijuana.  No DUI in the past. Patient stated that he had undergone alcohol-related treatment in 2018 at [place] for 24 days followed by 18 days in [place] for a total of 42 days of sobriety.  That was his longest period of sobriety.    Family history strongly  positive for alcohol addiction.        Past Medical History:  No past medical history on file.    Surgical History:  No past surgical history on file.    Social History:  Social History     Tobacco Use     Smoking status: Former Smoker       Family History:  No family history on file.    Medications:  Current Outpatient Medications   Medication     alcohol swab prep pads     blood glucose (NO BRAND SPECIFIED) test strip     blood glucose monitoring (NO BRAND SPECIFIED) meter device kit     FLUoxetine (PROZAC) 20 MG capsule     folic acid (FOLVITE) 1 MG tablet     furosemide (LASIX) 40 MG tablet     gabapentin (NEURONTIN) 100 MG capsule     hydrOXYzine (ATARAX) 25 MG tablet     insulin aspart (NOVOLOG FLEXPEN) 100 UNIT/ML pen     insulin glargine (LANTUS SOLOSTAR) 100 UNIT/ML pen     insulin pen needle (ULTICARE MICRO) 32G X 4 MM miscellaneous     lactulose (CHRONULAC) 10 GM/15ML solution     magnesium oxide (MAG-OX) 400 MG tablet     multivitamin, therapeutic (THERA-VIT) TABS tablet     pantoprazole (PROTONIX) 40 MG EC tablet     spironolactone (ALDACTONE) 100 MG tablet     thiamine (B-1) 100 MG tablet     thin (NO BRAND SPECIFIED) lancets     No current facility-administered medications for this visit.       Review of Systems  A complete 10 point review of systems was asked and answered in the negative unless specifically commented upon in the HPI    Objective:         Vitals:    05/05/22 1019 05/05/22 1020   BP: 129/81 128/80   Pulse: 110    Resp: 16    Temp: 97.8  F (36.6  C)    SpO2: 98%    Weight: 54.4 kg (120 lb)      Body mass index is 19.37 kg/m .     Physical Exam  Constitutional: anxious  HEENT:  + scleral icterus. Oral mucosa dry with crusted blood  Cardiac:  Regular rate and rhythm.  No overt murmurs  Respiratory: Clear to auscultation bilaterally.  No wheezes or rales  GI:  Abdomen soft, non-distended, non-tender. BS present. No shifting dullness.   Skin:  Skin is warm and dry. + jaundice.   Peripheral  Vascular: No  lower extremity edema.   Psychiatric: very anxious and getting panic  Neuro:  No asterixis, + tremor    Labs and Diagnostic tests:  Lab Results   Component Value Date     04/11/2022    POTASSIUM 3.8 04/12/2022    CHLORIDE 88 04/11/2022    CO2 29 04/11/2022    BUN 11 04/11/2022    CR 0.78 04/11/2022     Lab Results   Component Value Date    BILITOTAL 14.9 04/11/2022    ALT 75 04/11/2022     04/11/2022    ALKPHOS 139 04/11/2022     Lab Results   Component Value Date    ALBUMIN 3.1 04/11/2022    PROTTOTAL 9.0 04/11/2022      Lab Results   Component Value Date    WBC 9.1 04/10/2022    HGB 8.3 04/10/2022     04/10/2022     04/10/2022     Lab Results   Component Value Date    INR 1.97 04/11/2022       MELD-Na score: 28 at 4/11/2022  4:26 AM  MELD score: 24 at 4/11/2022  4:26 AM  Calculated from:  Serum Creatinine: 0.78 mg/dL (Using min of 1 mg/dL) at 4/11/2022  4:26 AM  Serum Sodium: 130 mmol/L at 4/11/2022  4:26 AM  Total Bilirubin: 14.9 mg/dL at 4/11/2022  4:26 AM  INR(ratio): 1.97 at 4/11/2022  4:26 AM  Age: 26 years        Imaging:  CT ABD 4/7/22:  IMPRESSION:   1.  Multifocal regions of solid, and groundglass nodularity in the visualized mid and lower lungs with superimposed background of mild interstitial infiltrates.     2.  Cirrhosis with splenomegaly and small volume ascites. Multiple varices compatible with portal hypertension.     3.  Gallbladder is distended with wall thickening which can be seen with parenchymal disease. Sludge within the gallbladder lumen.     4.  Anasarca.     5.  Calcification vas deferens compatible with diabetes.      Assessment and Plan:  26 year old male with past medical history of autism/Asperger's, diabetes type 2, anxiety, alcohol use disorder who is being consulted.    Alcoholic hepatitis  Patient has alcoholic hepatitis but did not receive steroids in the hospital due to concern for aspiration pneumonia.  He has leukocytosis with  persistently elevated bilirubin.  We are planning to do an ultrasound abdomen with Doppler to rule out portal vein thrombosis, no concern for infection and we will check Peth/urine ETG.  Likely he has alcoholic hepatitis and not full-blown cirrhosis but will be better evaluated in about 6 months of sobriety.    Patient was strongly encouraged to follow-up with chemical dependency and we will involve our addiction counselor for guidance regarding him.    --Checking urine ETG and PETH  --Ultrasound abdomen with Doppler  -- Continue diuretics Lasix and spironolactone at the current dose  --Continue lactulose for goal of 3-4 soft bowel movements  -- Upper endoscopy to rule out esophageal varices upon the next available  -- If continues to have bright red blood per rectum, may need colonoscopy  -- Limit 2 g sodium per day  -- Limit fluids up to 2000 mL/day  -- Complete alcohol abstinence and follow-up with addiction counselor  -- Continue gabapentin for cravings      Nutrition:  As with most patients with chronic liver disease, there is a significant degree of protein malnutrition.  Dicussed need to change dietary habits to improve overall protein balance.  Discussed the importance of eating between 1.2-1.5g/kg/day lean protein like eggs, fish, chicken, nuts, and legumes, in addition to a diet rich in fresh fruits and vegetables.  Continue to follow a sodium restricted (<2g sodium diet) and discussed the need to minimize the intake of carbohydrates and sugars, to avoid obesity.   - Strongly encourage protein supplements 2-3 times daily (Boost, Ensure, [place] Instant Milk, etc.) to meet protein and caloric intake.  - Recommend a bedtime snack with protein and complex carbohydrate to minimize risk of muscle catabolism overnight    Routine Health Care in Patient with Chronic Liver Disease:  - All patients with liver disease should avoid the use of Non-steroidal Anti-Inflammatory (NSAID) medications as they can cause  significant injury to the kidneys in this population    Follow Up:  -- in 3 months    Pt was seen and discussed with Dr. Wood.  Thank you very much for the opportunity to participate in the care of this patient.  If you have any further questions, please don't hesitate to contact our office.    Suha Gerardo M.D.  Advanced & Transplant Hepatology Fellow  [facility]      Attestation signed by Sergio Wood MD at 5/8/2022 10:18 PM:  The patient was seen and examined.  The above assessment and plan was developed jointly with the fellow.      Thank you very much for allowing me to participate in the care of this patient.  If you have any questions regarding my recommendations, please do not hesitate to contact me.         Sergio Wood MD      Professor of Medicine  [employer] [place] [employer]      Executive Medical Director, Solid Organ Transplant Program  [facility]

## 2022-05-05 NOTE — PROGRESS NOTES
Date of Service: 5/5/2022     Referring Provider: Elizabeth Guaman MD    Subjective:            Dillon Salter is a 26 year old male presenting for evaluation of liver disease    - Etiology: ETOH hepatitis  - HCC screening Last Ct abd April 2022    History of Present Illness   Dillon Salter is a 26 year old male with past medical history of autism/Asperger's, diabetes type 2, anxiety, alcohol use disorder who is being consulted.    Patient was recently admitted to the hospital on 4/6/2022 for alcoholic hepatitis, also had aspiration pneumonia during the hospital stay.    Since the hospital discharge he stated once in a while he gets bright red blood per rectum especially upon wiping with some dripping into the toilet bowl.  No melena, no ascites or abdominal pain.  He has been becoming very weak with disturbance of sleep pattern at night.      Patient has diabetes and is on insulin but stopped using it 1-1/2 years back and is now back since the hospital discharge.  His blood glucose control is very poor and he has been feeling thirsty all the time.He has lost weight since the last 1-1/2-year.      He started drinking at the age of 19 which initially was occasional over the weekend then started increasing till 2018 and from 2018 till now it has been a case of beer plus a bottle of wine every day to every other day he goes to the liquor store every other day.  Smokes cigarettes occasionally, no marijuana.  No DUI in the past. Patient stated that he had undergone alcohol-related treatment in 2018 at [place] for 24 days followed by 18 days in [place] for a total of 42 days of sobriety.  That was his longest period of sobriety.    Family history strongly positive for alcohol addiction.        Past Medical History:  No past medical history on file.    Surgical History:  No past surgical history on file.    Social History:  Social History     Tobacco Use     Smoking status: Former Smoker       Family History:  No  family history on file.    Medications:  Current Outpatient Medications   Medication     alcohol swab prep pads     blood glucose (NO BRAND SPECIFIED) test strip     blood glucose monitoring (NO BRAND SPECIFIED) meter device kit     FLUoxetine (PROZAC) 20 MG capsule     folic acid (FOLVITE) 1 MG tablet     furosemide (LASIX) 40 MG tablet     gabapentin (NEURONTIN) 100 MG capsule     hydrOXYzine (ATARAX) 25 MG tablet     insulin aspart (NOVOLOG FLEXPEN) 100 UNIT/ML pen     insulin glargine (LANTUS SOLOSTAR) 100 UNIT/ML pen     insulin pen needle (ULTICARE MICRO) 32G X 4 MM miscellaneous     lactulose (CHRONULAC) 10 GM/15ML solution     magnesium oxide (MAG-OX) 400 MG tablet     multivitamin, therapeutic (THERA-VIT) TABS tablet     pantoprazole (PROTONIX) 40 MG EC tablet     spironolactone (ALDACTONE) 100 MG tablet     thiamine (B-1) 100 MG tablet     thin (NO BRAND SPECIFIED) lancets     No current facility-administered medications for this visit.       Review of Systems  A complete 10 point review of systems was asked and answered in the negative unless specifically commented upon in the HPI    Objective:         Vitals:    05/05/22 1019 05/05/22 1020   BP: 129/81 128/80   Pulse: 110    Resp: 16    Temp: 97.8  F (36.6  C)    SpO2: 98%    Weight: 54.4 kg (120 lb)      Body mass index is 19.37 kg/m .     Physical Exam  Constitutional: anxious  HEENT:  + scleral icterus. Oral mucosa dry with crusted blood  Cardiac:  Regular rate and rhythm.  No overt murmurs  Respiratory: Clear to auscultation bilaterally.  No wheezes or rales  GI:  Abdomen soft, non-distended, non-tender. BS present. No shifting dullness.   Skin:  Skin is warm and dry. + jaundice.   Peripheral Vascular: No  lower extremity edema.   Psychiatric: very anxious and getting panic  Neuro:  No asterixis, + tremor    Labs and Diagnostic tests:  Lab Results   Component Value Date     04/11/2022    POTASSIUM 3.8 04/12/2022    CHLORIDE 88 04/11/2022    CO2  29 04/11/2022    BUN 11 04/11/2022    CR 0.78 04/11/2022     Lab Results   Component Value Date    BILITOTAL 14.9 04/11/2022    ALT 75 04/11/2022     04/11/2022    ALKPHOS 139 04/11/2022     Lab Results   Component Value Date    ALBUMIN 3.1 04/11/2022    PROTTOTAL 9.0 04/11/2022      Lab Results   Component Value Date    WBC 9.1 04/10/2022    HGB 8.3 04/10/2022     04/10/2022     04/10/2022     Lab Results   Component Value Date    INR 1.97 04/11/2022       MELD-Na score: 28 at 4/11/2022  4:26 AM  MELD score: 24 at 4/11/2022  4:26 AM  Calculated from:  Serum Creatinine: 0.78 mg/dL (Using min of 1 mg/dL) at 4/11/2022  4:26 AM  Serum Sodium: 130 mmol/L at 4/11/2022  4:26 AM  Total Bilirubin: 14.9 mg/dL at 4/11/2022  4:26 AM  INR(ratio): 1.97 at 4/11/2022  4:26 AM  Age: 26 years        Imaging:  CT ABD 4/7/22:  IMPRESSION:   1.  Multifocal regions of solid, and groundglass nodularity in the visualized mid and lower lungs with superimposed background of mild interstitial infiltrates.     2.  Cirrhosis with splenomegaly and small volume ascites. Multiple varices compatible with portal hypertension.     3.  Gallbladder is distended with wall thickening which can be seen with parenchymal disease. Sludge within the gallbladder lumen.     4.  Anasarca.     5.  Calcification vas deferens compatible with diabetes.      Assessment and Plan:  26 year old male with past medical history of autism/Asperger's, diabetes type 2, anxiety, alcohol use disorder who is being consulted.    Alcoholic hepatitis  Patient has alcoholic hepatitis but did not receive steroids in the hospital due to concern for aspiration pneumonia.  He has leukocytosis with persistently elevated bilirubin.  We are planning to do an ultrasound abdomen with Doppler to rule out portal vein thrombosis, no concern for infection and we will check Peth/urine ETG.  Likely he has alcoholic hepatitis and not full-blown cirrhosis but will be better  evaluated in about 6 months of sobriety.    Patient was strongly encouraged to follow-up with chemical dependency and we will involve our addiction counselor for guidance regarding him.    --Checking urine ETG and PETH  --Ultrasound abdomen with Doppler  -- Continue diuretics Lasix and spironolactone at the current dose  --Continue lactulose for goal of 3-4 soft bowel movements  -- Upper endoscopy to rule out esophageal varices upon the next available  -- If continues to have bright red blood per rectum, may need colonoscopy  -- Limit 2 g sodium per day  -- Limit fluids up to 2000 mL/day  -- Complete alcohol abstinence and follow-up with addiction counselor  -- Continue gabapentin for cravings      Nutrition:  As with most patients with chronic liver disease, there is a significant degree of protein malnutrition.  Dicussed need to change dietary habits to improve overall protein balance.  Discussed the importance of eating between 1.2-1.5g/kg/day lean protein like eggs, fish, chicken, nuts, and legumes, in addition to a diet rich in fresh fruits and vegetables.  Continue to follow a sodium restricted (<2g sodium diet) and discussed the need to minimize the intake of carbohydrates and sugars, to avoid obesity.   - Strongly encourage protein supplements 2-3 times daily (Boost, Ensure, [place] Instant Milk, etc.) to meet protein and caloric intake.  - Recommend a bedtime snack with protein and complex carbohydrate to minimize risk of muscle catabolism overnight    Routine Health Care in Patient with Chronic Liver Disease:  - All patients with liver disease should avoid the use of Non-steroidal Anti-Inflammatory (NSAID) medications as they can cause significant injury to the kidneys in this population    Follow Up:  -- in 3 months    Pt was seen and discussed with Dr. Wood.  Thank you very much for the opportunity to participate in the care of this patient.  If you have any further questions, please don't hesitate to  contact our office.    Suha Gerardo M.D.  Advanced & Transplant Hepatology Fellow  [facility] [place]

## 2022-05-06 LAB
ETHYL GLUCURONIDE UR QL SCN: NEGATIVE NG/ML
HBV CORE AB SERPL QL IA: NONREACTIVE
HBV E AB SERPL QL IA: NEGATIVE
HBV E AG SERPL QL IA: NEGATIVE
HBV SURFACE AB SERPL IA-ACNC: >1000 M[IU]/ML
HBV SURFACE AG SERPL QL IA: NONREACTIVE
HCV AB SERPL QL IA: NONREACTIVE

## 2022-05-10 ENCOUNTER — TELEPHONE (OUTPATIENT)
Dept: TRANSPLANT | Facility: CLINIC | Age: 27
End: 2022-05-10
Payer: COMMERCIAL

## 2022-05-10 DIAGNOSIS — K70.31 ALCOHOLIC CIRRHOSIS OF LIVER WITH ASCITES (H): Primary | ICD-10-CM

## 2022-05-10 NOTE — TELEPHONE ENCOUNTER
Left VM for mother (Leticia) regarding ROBERT resources for patient at request of MD. (See EMR). Introduced self and my function within LT support/consultation. Left call back number if they are interested in connection to next steps for evaluation and ROBERT recommendations.     Ashkan Singleton, MARC

## 2022-05-16 NOTE — TELEPHONE ENCOUNTER
From Dr. Leventhal message:  Lets get him set up for urine MJ, urine ethyl gluc, and PeTH every 2 months for the next 6 months

## 2022-05-17 ENCOUNTER — HOSPITAL ENCOUNTER (OUTPATIENT)
Facility: CLINIC | Age: 27
End: 2022-05-17
Attending: INTERNAL MEDICINE | Admitting: INTERNAL MEDICINE
Payer: COMMERCIAL

## 2022-05-17 ENCOUNTER — TELEPHONE (OUTPATIENT)
Dept: GASTROENTEROLOGY | Facility: CLINIC | Age: 27
End: 2022-05-17
Payer: COMMERCIAL

## 2022-05-17 DIAGNOSIS — K70.31 ALCOHOLIC CIRRHOSIS OF LIVER WITH ASCITES (H): Primary | ICD-10-CM

## 2022-05-17 DIAGNOSIS — R79.89 ELEVATED LFTS: ICD-10-CM

## 2022-05-17 NOTE — TELEPHONE ENCOUNTER
Lab orders faxed to [facility] [phone].    Tejal DOOLEY LPN  Hepatology Clinic     [facility] Call Center    Phone Message    May a detailed message be left on voicemail: yes     Reason for Call: Other: Please fax the lab draw [facility], [facility] Mom[facility] Walters[phone]. Patient will have labs drawn around 8/1/22 for the 8/8/22 appt with Momo Walters.     Action Taken: Message routed to:  [facility]    Travel Screening: Not Applicable

## 2022-05-17 NOTE — TELEPHONE ENCOUNTER
Screening Questions  BlueKIND OF PREP RedLOCATION [review exclusion criteria] GreenSEDATION TYPE  1. Have you had a positive covid test in the last 90 days? N     2. Do you have a legal guardian or medical Power of ? N Are you able to give consent for your medical care?Y (Sedation review/consideration needed)    3. Are you active on mychart? N    4. What insurance is in the chart? [employer]     3.   Ordering/Referring Provider: [facility]    4. BMI 20 [BMI OVER 40-EXTENDED PREP]  If greater than 40 review exclusion criteria [PAC APPT IF @ [facility]]        5.  Respiratory Screening :  [If yes to any of the following HOSPITAL setting only]     Do you use daily home oxygen? N    Do you have mod to severe Obstructive Sleep Apnea? N  [OKAY @ [facility] SH PH RI]   Do you have Pulmonary Hypertension? N     Do you have UNCONTROLLED asthma? N        6.   Have you had a heart or lung transplant? N      7.   Are you currently on dialysis? N [ If yes, G-PREP & HOSPITAL setting only]     8.   Do you have chronic kidney disease? N [ If yes, G-PREP ]    9.   Have you had a stroke or Transient ischemic attack (TIA - aka  mini stroke ) within 6 months?  N (If yes, please review exclusion criteria)    10.   In the past 6 months, have you had any heart related issues including cardiomyopathy or heart attack? N           If yes, did it require cardiac stenting or other implantable device?       11.   Do you have any implantable devices in your body (pacemaker, defib, LVAD)? N (If yes, please review exclusion criteria)    12.   Do you take nitroglycerin? N           If yes, how often?   (if yes, HOSPITAL setting ONLY)    13.   Are you currently taking any blood thinners? N           [IF YES, INFORM PATIENT TO FOLLOW UP W/ ORDERING PROVIDER FOR BRIDGING INSTRUCTIONS]     14.   Do you have a diagnosis of diabetes? Y- TYPE 2   [ If yes, G-PREP ]    15.   [FEMALES] Are you currently pregnant?     If yes, how many weeks?     16.   Are you  taking any prescription pain medications on a routine schedule?  N  [ If yes, EXTENDED PREP.] [If yes, MAC]    17.   Do you have any chemical dependencies such as alcohol, street drugs, or methadone?  SOBER [If yes, MAC]    18.   Do you have any history of post-traumatic stress syndrome, severe anxiety or history of psychosis?  N  [If yes, MAC]    19.   Do you transfer independently?  Y    20.  On a regular basis do you go 3-5 days between bowel movements?    [ If yes, EXTENDED PREP.]    21.   Preferred LOCAL Pharmacy for Pre Prescription   [id number] [address]  [place] [facility]      Scheduling Details      Caller : Leticia Salter (mother)  (Please ask for phone number if not scheduled by patient)    Type of Procedure Scheduled: EGD  Which Colonoscopy Prep was Sent?:   VIRY CF PATIENTS & GROEN'S PATIENTS NEEDS EXTENDED PREP  Surgeon: CAMPOS  Date of Procedure: 6/9/22  Location: [facility]      Sedation Type: MAC  Conscious Sedation- Needs  for 6 hours after the procedure  MAC/General-Needs  for 24 hours after procedure    Pre-op Required at [facility] [place], [place] and OR for MAC sedation: N  (advise patient they will need a pre-op prior to procedure -)      Informed patient they will need an adult  Y  Cannot take any type of public or medical transportation alone    Pre-Procedure Covid test to be completed at [facility] or Externally: EXTERNALLY    Confirmed Nurse will call to complete assessment Y    Additional comments:

## 2022-05-23 ENCOUNTER — APPOINTMENT (OUTPATIENT)
Dept: RADIOLOGY | Facility: CLINIC | Age: 27
DRG: 432 | End: 2022-05-23
Attending: EMERGENCY MEDICINE
Payer: COMMERCIAL

## 2022-05-23 ENCOUNTER — HOSPITAL ENCOUNTER (INPATIENT)
Facility: CLINIC | Age: 27
LOS: 14 days | Discharge: HOME | DRG: 432 | End: 2022-06-07
Attending: EMERGENCY MEDICINE | Admitting: HOSPITALIST
Payer: COMMERCIAL

## 2022-05-23 DIAGNOSIS — F10.930 ALCOHOL WITHDRAWAL, UNCOMPLICATED (H): Chronic | ICD-10-CM

## 2022-05-23 DIAGNOSIS — E11.69 DIABETES MELLITUS TYPE 2 IN OBESE: Primary | Chronic | ICD-10-CM

## 2022-05-23 DIAGNOSIS — E66.9 DIABETES MELLITUS TYPE 2 IN OBESE: Primary | Chronic | ICD-10-CM

## 2022-05-23 DIAGNOSIS — F10.921 ALCOHOL INTOXICATION WITH DELIRIUM (H): Chronic | ICD-10-CM

## 2022-05-23 DIAGNOSIS — D64.9 ANEMIA, UNSPECIFIED TYPE: ICD-10-CM

## 2022-05-23 DIAGNOSIS — K92.0 HEMATEMESIS, PRESENCE OF NAUSEA NOT SPECIFIED: ICD-10-CM

## 2022-05-23 DIAGNOSIS — K70.30 ALCOHOLIC CIRRHOSIS OF LIVER WITHOUT ASCITES (H): ICD-10-CM

## 2022-05-23 DIAGNOSIS — K70.31 ALCOHOLIC CIRRHOSIS OF LIVER WITH ASCITES (H): ICD-10-CM

## 2022-05-23 DIAGNOSIS — G93.40 ENCEPHALOPATHY: ICD-10-CM

## 2022-05-23 PROBLEM — F41.9 ANXIETY AND DEPRESSION: Status: ACTIVE | Noted: 2022-05-09

## 2022-05-23 PROBLEM — F32.A ANXIETY AND DEPRESSION: Status: ACTIVE | Noted: 2022-05-09

## 2022-05-23 PROBLEM — N48.1 BALANITIS: Status: ACTIVE | Noted: 2022-05-09

## 2022-05-23 PROBLEM — K70.10 ALCOHOLIC HEPATITIS (H): Status: ACTIVE | Noted: 2022-05-09

## 2022-05-23 LAB
ABO/RH(D): NORMAL
ALBUMIN SERPL-MCNC: 2.7 G/DL (ref 3.5–5)
ALBUMIN UR-MCNC: NEGATIVE MG/DL
ALP SERPL-CCNC: 190 U/L (ref 45–120)
ALT SERPL W P-5'-P-CCNC: 63 U/L (ref 0–45)
AMMONIA PLAS-SCNC: 40 UMOL/L (ref 11–35)
ANION GAP SERPL CALCULATED.3IONS-SCNC: 11 MMOL/L (ref 5–18)
ANTIBODY SCREEN: NEGATIVE
APPEARANCE UR: CLEAR
AST SERPL W P-5'-P-CCNC: 133 U/L (ref 0–40)
BACTERIA #/AREA URNS HPF: ABNORMAL /HPF
BILIRUB DIRECT SERPL-MCNC: 3.7 MG/DL
BILIRUB SERPL-MCNC: 14.2 MG/DL (ref 0–1)
BILIRUB UR QL STRIP: NEGATIVE
BLD PROD TYP BPU: NORMAL
BLD PROD TYP BPU: NORMAL
BLOOD COMPONENT TYPE: NORMAL
BLOOD COMPONENT TYPE: NORMAL
BUN SERPL-MCNC: 46 MG/DL (ref 8–22)
CALCIUM SERPL-MCNC: 9.4 MG/DL (ref 8.5–10.5)
CHLORIDE BLD-SCNC: 91 MMOL/L (ref 98–107)
CO2 SERPL-SCNC: 26 MMOL/L (ref 22–31)
CODING SYSTEM: NORMAL
CODING SYSTEM: NORMAL
COLOR UR AUTO: YELLOW
CREAT SERPL-MCNC: 0.78 MG/DL (ref 0.7–1.3)
CROSSMATCH: NORMAL
CROSSMATCH: NORMAL
ERYTHROCYTE [DISTWIDTH] IN BLOOD BY AUTOMATED COUNT: 22.3 % (ref 10–15)
ETHANOL SERPL-MCNC: <10 MG/DL
GFR SERPL CREATININE-BSD FRML MDRD: >90 ML/MIN/1.73M2
GLUCOSE BLD-MCNC: 98 MG/DL (ref 70–125)
GLUCOSE UR STRIP-MCNC: NEGATIVE MG/DL
HCT VFR BLD AUTO: 15.5 % (ref 40–53)
HGB BLD-MCNC: 5.3 G/DL (ref 13.3–17.7)
HGB UR QL STRIP: NEGATIVE
INR PPP: 2.13 (ref 0.85–1.15)
ISSUE DATE AND TIME: NORMAL
ISSUE DATE AND TIME: NORMAL
KETONES UR STRIP-MCNC: NEGATIVE MG/DL
LEUKOCYTE ESTERASE UR QL STRIP: NEGATIVE
MCH RBC QN AUTO: 37.9 PG (ref 26.5–33)
MCHC RBC AUTO-ENTMCNC: 34.2 G/DL (ref 31.5–36.5)
MCV RBC AUTO: 111 FL (ref 78–100)
NITRATE UR QL: POSITIVE
PH UR STRIP: 6 [PH] (ref 5–7)
PLATELET # BLD AUTO: 63 10E3/UL (ref 150–450)
POTASSIUM BLD-SCNC: 5.4 MMOL/L (ref 3.5–5)
PROT SERPL-MCNC: 8.8 G/DL (ref 6–8)
RBC # BLD AUTO: 1.4 10E6/UL (ref 4.4–5.9)
RBC URINE: 1 /HPF
SODIUM SERPL-SCNC: 128 MMOL/L (ref 136–145)
SP GR UR STRIP: 1.01 (ref 1–1.03)
SPECIMEN EXPIRATION DATE: NORMAL
SQUAMOUS EPITHELIAL: 2 /HPF
UNIT ABO/RH: NORMAL
UNIT ABO/RH: NORMAL
UNIT NUMBER: NORMAL
UNIT NUMBER: NORMAL
UNIT STATUS: NORMAL
UNIT STATUS: NORMAL
UNIT TYPE ISBT: 600
UNIT TYPE ISBT: 600
UROBILINOGEN UR STRIP-MCNC: 3 MG/DL
WBC # BLD AUTO: 22.8 10E3/UL (ref 4–11)
WBC URINE: 2 /HPF

## 2022-05-23 PROCEDURE — 80053 COMPREHEN METABOLIC PANEL: CPT | Performed by: EMERGENCY MEDICINE

## 2022-05-23 PROCEDURE — C9113 INJ PANTOPRAZOLE SODIUM, VIA: HCPCS | Performed by: EMERGENCY MEDICINE

## 2022-05-23 PROCEDURE — 86901 BLOOD TYPING SEROLOGIC RH(D): CPT | Performed by: EMERGENCY MEDICINE

## 2022-05-23 PROCEDURE — 93005 ELECTROCARDIOGRAM TRACING: CPT

## 2022-05-23 PROCEDURE — 86923 COMPATIBILITY TEST ELECTRIC: CPT | Performed by: HOSPITALIST

## 2022-05-23 PROCEDURE — 82248 BILIRUBIN DIRECT: CPT | Performed by: EMERGENCY MEDICINE

## 2022-05-23 PROCEDURE — 51702 INSERT TEMP BLADDER CATH: CPT

## 2022-05-23 PROCEDURE — 87088 URINE BACTERIA CULTURE: CPT | Performed by: EMERGENCY MEDICINE

## 2022-05-23 PROCEDURE — 96361 HYDRATE IV INFUSION ADD-ON: CPT

## 2022-05-23 PROCEDURE — 82140 ASSAY OF AMMONIA: CPT | Performed by: EMERGENCY MEDICINE

## 2022-05-23 PROCEDURE — 250N000011 HC RX IP 250 OP 636: Performed by: EMERGENCY MEDICINE

## 2022-05-23 PROCEDURE — 85027 COMPLETE CBC AUTOMATED: CPT | Performed by: EMERGENCY MEDICINE

## 2022-05-23 PROCEDURE — C9803 HOPD COVID-19 SPEC COLLECT: HCPCS

## 2022-05-23 PROCEDURE — 71045 X-RAY EXAM CHEST 1 VIEW: CPT

## 2022-05-23 PROCEDURE — 99285 EMERGENCY DEPT VISIT HI MDM: CPT | Mod: 25

## 2022-05-23 PROCEDURE — 96365 THER/PROPH/DIAG IV INF INIT: CPT

## 2022-05-23 PROCEDURE — 82077 ASSAY SPEC XCP UR&BREATH IA: CPT | Performed by: EMERGENCY MEDICINE

## 2022-05-23 PROCEDURE — 87077 CULTURE AEROBIC IDENTIFY: CPT | Performed by: EMERGENCY MEDICINE

## 2022-05-23 PROCEDURE — 250N000009 HC RX 250: Performed by: EMERGENCY MEDICINE

## 2022-05-23 PROCEDURE — 36415 COLL VENOUS BLD VENIPUNCTURE: CPT | Performed by: EMERGENCY MEDICINE

## 2022-05-23 PROCEDURE — 86850 RBC ANTIBODY SCREEN: CPT | Performed by: EMERGENCY MEDICINE

## 2022-05-23 PROCEDURE — 96375 TX/PRO/DX INJ NEW DRUG ADDON: CPT

## 2022-05-23 PROCEDURE — 85610 PROTHROMBIN TIME: CPT | Performed by: EMERGENCY MEDICINE

## 2022-05-23 PROCEDURE — 87149 DNA/RNA DIRECT PROBE: CPT | Performed by: EMERGENCY MEDICINE

## 2022-05-23 PROCEDURE — 81001 URINALYSIS AUTO W/SCOPE: CPT | Performed by: EMERGENCY MEDICINE

## 2022-05-23 PROCEDURE — 86923 COMPATIBILITY TEST ELECTRIC: CPT | Performed by: EMERGENCY MEDICINE

## 2022-05-23 PROCEDURE — 36430 TRANSFUSION BLD/BLD COMPNT: CPT

## 2022-05-23 PROCEDURE — 258N000003 HC RX IP 258 OP 636: Performed by: EMERGENCY MEDICINE

## 2022-05-23 RX ORDER — SODIUM CHLORIDE 9 MG/ML
INJECTION, SOLUTION INTRAVENOUS ONCE
Status: COMPLETED | OUTPATIENT
Start: 2022-05-23 | End: 2022-05-24

## 2022-05-23 RX ORDER — OCTREOTIDE ACETATE 50 UG/ML
50 INJECTION, SOLUTION INTRAVENOUS; SUBCUTANEOUS ONCE
Status: COMPLETED | OUTPATIENT
Start: 2022-05-23 | End: 2022-05-24

## 2022-05-23 RX ORDER — ONDANSETRON 2 MG/ML
4 INJECTION INTRAMUSCULAR; INTRAVENOUS ONCE
Status: COMPLETED | OUTPATIENT
Start: 2022-05-23 | End: 2022-05-23

## 2022-05-23 RX ORDER — CEFTRIAXONE 2 G/1
2 INJECTION, POWDER, FOR SOLUTION INTRAMUSCULAR; INTRAVENOUS ONCE
Status: COMPLETED | OUTPATIENT
Start: 2022-05-23 | End: 2022-05-24

## 2022-05-23 RX ORDER — LIDOCAINE HYDROCHLORIDE 20 MG/ML
10 JELLY TOPICAL ONCE
Status: COMPLETED | OUTPATIENT
Start: 2022-05-23 | End: 2022-05-23

## 2022-05-23 RX ADMIN — CEFTRIAXONE SODIUM 2 G: 2 INJECTION, POWDER, FOR SOLUTION INTRAMUSCULAR; INTRAVENOUS at 23:52

## 2022-05-23 RX ADMIN — PANTOPRAZOLE SODIUM 40 MG: 40 INJECTION, POWDER, FOR SOLUTION INTRAVENOUS at 23:14

## 2022-05-23 RX ADMIN — LIDOCAINE HYDROCHLORIDE 10 ML: 20 JELLY TOPICAL at 23:00

## 2022-05-23 RX ADMIN — SODIUM CHLORIDE: 9 INJECTION, SOLUTION INTRAVENOUS at 23:14

## 2022-05-23 RX ADMIN — ONDANSETRON 4 MG: 2 INJECTION INTRAMUSCULAR; INTRAVENOUS at 23:14

## 2022-05-23 ASSESSMENT — ENCOUNTER SYMPTOMS
ABDOMINAL PAIN: 0
CONFUSION: 1
APPETITE CHANGE: 1
WEAKNESS: 1
NAUSEA: 0
CONSTIPATION: 1
VOMITING: 1

## 2022-05-24 ENCOUNTER — ANESTHESIA EVENT (OUTPATIENT)
Dept: SURGERY | Facility: CLINIC | Age: 27
DRG: 432 | End: 2022-05-24
Payer: COMMERCIAL

## 2022-05-24 ENCOUNTER — ANESTHESIA (OUTPATIENT)
Dept: SURGERY | Facility: CLINIC | Age: 27
DRG: 432 | End: 2022-05-24
Payer: COMMERCIAL

## 2022-05-24 ENCOUNTER — APPOINTMENT (OUTPATIENT)
Dept: ULTRASOUND IMAGING | Facility: CLINIC | Age: 27
DRG: 432 | End: 2022-05-24
Attending: PHYSICIAN ASSISTANT
Payer: COMMERCIAL

## 2022-05-24 PROBLEM — G93.40 ENCEPHALOPATHY: Status: ACTIVE | Noted: 2022-05-24

## 2022-05-24 PROBLEM — K70.30 ALCOHOLIC CIRRHOSIS OF LIVER WITHOUT ASCITES (H): Status: ACTIVE | Noted: 2022-05-24

## 2022-05-24 PROBLEM — K92.0 HEMATEMESIS, PRESENCE OF NAUSEA NOT SPECIFIED: Status: ACTIVE | Noted: 2022-05-24

## 2022-05-24 PROBLEM — D64.9 ANEMIA, UNSPECIFIED TYPE: Status: ACTIVE | Noted: 2022-05-24

## 2022-05-24 LAB
ALBUMIN SERPL-MCNC: 2.5 G/DL (ref 3.5–5)
ALBUMIN SERPL-MCNC: 2.5 G/DL (ref 3.5–5)
ALP SERPL-CCNC: 160 U/L (ref 45–120)
ALT SERPL W P-5'-P-CCNC: 56 U/L (ref 0–45)
ANION GAP SERPL CALCULATED.3IONS-SCNC: 9 MMOL/L (ref 5–18)
AST SERPL W P-5'-P-CCNC: 125 U/L (ref 0–40)
BILIRUB SERPL-MCNC: 13.2 MG/DL (ref 0–1)
BLD PROD TYP BPU: NORMAL
BLOOD COMPONENT TYPE: NORMAL
BUN SERPL-MCNC: 42 MG/DL (ref 8–22)
CALCIUM SERPL-MCNC: 8.8 MG/DL (ref 8.5–10.5)
CHLORIDE BLD-SCNC: 96 MMOL/L (ref 98–107)
CO2 SERPL-SCNC: 25 MMOL/L (ref 22–31)
CODING SYSTEM: NORMAL
CREAT SERPL-MCNC: 0.72 MG/DL (ref 0.7–1.3)
ENTEROCOCCUS FAECALIS: NOT DETECTED
ENTEROCOCCUS FAECIUM: NOT DETECTED
ERYTHROCYTE [DISTWIDTH] IN BLOOD BY AUTOMATED COUNT: 18.6 % (ref 10–15)
FIBRINOGEN PPP-MCNC: 223 MG/DL (ref 170–490)
GFR SERPL CREATININE-BSD FRML MDRD: >90 ML/MIN/1.73M2
GLUCOSE BLD-MCNC: 42 MG/DL (ref 70–125)
GLUCOSE BLDC GLUCOMTR-MCNC: 113 MG/DL (ref 70–99)
GLUCOSE BLDC GLUCOMTR-MCNC: 119 MG/DL (ref 70–99)
GLUCOSE BLDC GLUCOMTR-MCNC: 129 MG/DL (ref 70–99)
GLUCOSE BLDC GLUCOMTR-MCNC: 194 MG/DL (ref 70–99)
GLUCOSE BLDC GLUCOMTR-MCNC: 251 MG/DL (ref 70–99)
GLUCOSE BLDC GLUCOMTR-MCNC: 274 MG/DL (ref 70–99)
GLUCOSE BLDC GLUCOMTR-MCNC: 87 MG/DL (ref 70–99)
GLUCOSE BLDC GLUCOMTR-MCNC: 98 MG/DL (ref 70–99)
HCT VFR BLD AUTO: 22.2 % (ref 40–53)
HGB BLD-MCNC: 7.7 G/DL (ref 13.3–17.7)
HOLD SPECIMEN: NORMAL
ISSUE DATE AND TIME: NORMAL
LISTERIA SPECIES (DETECTED/NOT DETECTED): NOT DETECTED
MCH RBC QN AUTO: 34.7 PG (ref 26.5–33)
MCHC RBC AUTO-ENTMCNC: 34.7 G/DL (ref 31.5–36.5)
MCV RBC AUTO: 100 FL (ref 78–100)
PLATELET # BLD AUTO: 39 10E3/UL (ref 150–450)
POTASSIUM BLD-SCNC: 4.9 MMOL/L (ref 3.5–5)
PROT SERPL-MCNC: 8.1 G/DL (ref 6–8)
RBC # BLD AUTO: 2.22 10E6/UL (ref 4.4–5.9)
SARS-COV-2 RNA RESP QL NAA+PROBE: NEGATIVE
SODIUM SERPL-SCNC: 130 MMOL/L (ref 136–145)
STAPHYLOCOCCUS AUREUS: DETECTED
STAPHYLOCOCCUS EPIDERMIDIS: NOT DETECTED
STAPHYLOCOCCUS LUGDUNENSIS: NOT DETECTED
STREPTOCOCCUS AGALACTIAE: NOT DETECTED
STREPTOCOCCUS ANGINOSUS GROUP: NOT DETECTED
STREPTOCOCCUS PNEUMONIAE: NOT DETECTED
STREPTOCOCCUS PYOGENES: NOT DETECTED
STREPTOCOCCUS SPECIES: NOT DETECTED
UNIT ABO/RH: NORMAL
UNIT NUMBER: NORMAL
UNIT STATUS: NORMAL
UNIT TYPE ISBT: 9500
UPPER GI ENDOSCOPY: NORMAL
WBC # BLD AUTO: 17.1 10E3/UL (ref 4–11)

## 2022-05-24 PROCEDURE — 250N000011 HC RX IP 250 OP 636: Performed by: STUDENT IN AN ORGANIZED HEALTH CARE EDUCATION/TRAINING PROGRAM

## 2022-05-24 PROCEDURE — 370N000017 HC ANESTHESIA TECHNICAL FEE, PER MIN: Performed by: INTERNAL MEDICINE

## 2022-05-24 PROCEDURE — 06L38CZ OCCLUSION OF ESOPHAGEAL VEIN WITH EXTRALUMINAL DEVICE, VIA NATURAL OR ARTIFICIAL OPENING ENDOSCOPIC: ICD-10-PCS | Performed by: FAMILY MEDICINE

## 2022-05-24 PROCEDURE — 96376 TX/PRO/DX INJ SAME DRUG ADON: CPT

## 2022-05-24 PROCEDURE — 96366 THER/PROPH/DIAG IV INF ADDON: CPT

## 2022-05-24 PROCEDURE — 258N000003 HC RX IP 258 OP 636: Performed by: NURSE ANESTHETIST, CERTIFIED REGISTERED

## 2022-05-24 PROCEDURE — 85384 FIBRINOGEN ACTIVITY: CPT | Performed by: STUDENT IN AN ORGANIZED HEALTH CARE EDUCATION/TRAINING PROGRAM

## 2022-05-24 PROCEDURE — 250N000025 HC SEVOFLURANE, PER MIN: Performed by: INTERNAL MEDICINE

## 2022-05-24 PROCEDURE — 250N000011 HC RX IP 250 OP 636: Performed by: INTERNAL MEDICINE

## 2022-05-24 PROCEDURE — U0005 INFEC AGEN DETEC AMPLI PROBE: HCPCS | Performed by: EMERGENCY MEDICINE

## 2022-05-24 PROCEDURE — 258N000001 HC RX 258: Performed by: ANESTHESIOLOGY

## 2022-05-24 PROCEDURE — 210N000002 HC R&B HEART CARE

## 2022-05-24 PROCEDURE — 272N000001 HC OR GENERAL SUPPLY STERILE: Performed by: INTERNAL MEDICINE

## 2022-05-24 PROCEDURE — 999N000141 HC STATISTIC PRE-PROCEDURE NURSING ASSESSMENT: Performed by: INTERNAL MEDICINE

## 2022-05-24 PROCEDURE — 250N000013 HC RX MED GY IP 250 OP 250 PS 637: Performed by: STUDENT IN AN ORGANIZED HEALTH CARE EDUCATION/TRAINING PROGRAM

## 2022-05-24 PROCEDURE — 85014 HEMATOCRIT: CPT | Performed by: HOSPITALIST

## 2022-05-24 PROCEDURE — 93975 VASCULAR STUDY: CPT

## 2022-05-24 PROCEDURE — 96365 THER/PROPH/DIAG IV INF INIT: CPT | Mod: 59

## 2022-05-24 PROCEDURE — 96367 TX/PROPH/DG ADDL SEQ IV INF: CPT

## 2022-05-24 PROCEDURE — 80053 COMPREHEN METABOLIC PANEL: CPT | Performed by: HOSPITALIST

## 2022-05-24 PROCEDURE — C9113 INJ PANTOPRAZOLE SODIUM, VIA: HCPCS | Performed by: HOSPITALIST

## 2022-05-24 PROCEDURE — 36415 COLL VENOUS BLD VENIPUNCTURE: CPT | Performed by: STUDENT IN AN ORGANIZED HEALTH CARE EDUCATION/TRAINING PROGRAM

## 2022-05-24 PROCEDURE — 250N000009 HC RX 250: Performed by: NURSE ANESTHETIST, CERTIFIED REGISTERED

## 2022-05-24 PROCEDURE — 96374 THER/PROPH/DIAG INJ IV PUSH: CPT | Mod: 59

## 2022-05-24 PROCEDURE — 250N000011 HC RX IP 250 OP 636: Mod: JA | Performed by: EMERGENCY MEDICINE

## 2022-05-24 PROCEDURE — P9035 PLATELET PHERES LEUKOREDUCED: HCPCS | Performed by: STUDENT IN AN ORGANIZED HEALTH CARE EDUCATION/TRAINING PROGRAM

## 2022-05-24 PROCEDURE — 258N000003 HC RX IP 258 OP 636: Performed by: EMERGENCY MEDICINE

## 2022-05-24 PROCEDURE — P9016 RBC LEUKOCYTES REDUCED: HCPCS | Performed by: EMERGENCY MEDICINE

## 2022-05-24 PROCEDURE — 250N000013 HC RX MED GY IP 250 OP 250 PS 637: Performed by: INTERNAL MEDICINE

## 2022-05-24 PROCEDURE — 99223 1ST HOSP IP/OBS HIGH 75: CPT | Performed by: HOSPITALIST

## 2022-05-24 PROCEDURE — 360N000075 HC SURGERY LEVEL 2, PER MIN: Performed by: INTERNAL MEDICINE

## 2022-05-24 PROCEDURE — 250N000011 HC RX IP 250 OP 636: Performed by: HOSPITALIST

## 2022-05-24 PROCEDURE — 258N000003 HC RX IP 258 OP 636: Performed by: STUDENT IN AN ORGANIZED HEALTH CARE EDUCATION/TRAINING PROGRAM

## 2022-05-24 PROCEDURE — 99233 SBSQ HOSP IP/OBS HIGH 50: CPT | Performed by: STUDENT IN AN ORGANIZED HEALTH CARE EDUCATION/TRAINING PROGRAM

## 2022-05-24 PROCEDURE — 36415 COLL VENOUS BLD VENIPUNCTURE: CPT | Performed by: HOSPITALIST

## 2022-05-24 PROCEDURE — 96375 TX/PRO/DX INJ NEW DRUG ADDON: CPT

## 2022-05-24 PROCEDURE — C9113 INJ PANTOPRAZOLE SODIUM, VIA: HCPCS | Performed by: INTERNAL MEDICINE

## 2022-05-24 PROCEDURE — 258N000001 HC RX 258: Performed by: HOSPITALIST

## 2022-05-24 PROCEDURE — 258N000003 HC RX IP 258 OP 636: Performed by: HOSPITALIST

## 2022-05-24 PROCEDURE — 710N000010 HC RECOVERY PHASE 1, LEVEL 2, PER MIN: Performed by: INTERNAL MEDICINE

## 2022-05-24 PROCEDURE — 250N000011 HC RX IP 250 OP 636: Performed by: NURSE ANESTHETIST, CERTIFIED REGISTERED

## 2022-05-24 PROCEDURE — 258N000003 HC RX IP 258 OP 636: Performed by: INTERNAL MEDICINE

## 2022-05-24 RX ORDER — DEXTROSE MONOHYDRATE 25 G/50ML
25 INJECTION, SOLUTION INTRAVENOUS ONCE
Status: COMPLETED | OUTPATIENT
Start: 2022-05-24 | End: 2022-05-24

## 2022-05-24 RX ORDER — PROCHLORPERAZINE MALEATE 10 MG
10 TABLET ORAL EVERY 6 HOURS PRN
Status: DISCONTINUED | OUTPATIENT
Start: 2022-05-24 | End: 2022-06-07 | Stop reason: HOSPADM

## 2022-05-24 RX ORDER — LIDOCAINE 40 MG/G
CREAM TOPICAL
Status: DISCONTINUED | OUTPATIENT
Start: 2022-05-24 | End: 2022-05-24 | Stop reason: HOSPADM

## 2022-05-24 RX ORDER — NALOXONE HYDROCHLORIDE 0.4 MG/ML
0.4 INJECTION, SOLUTION INTRAMUSCULAR; INTRAVENOUS; SUBCUTANEOUS
Status: DISCONTINUED | OUTPATIENT
Start: 2022-05-24 | End: 2022-06-07 | Stop reason: HOSPADM

## 2022-05-24 RX ORDER — ONDANSETRON 4 MG/1
4 TABLET, ORALLY DISINTEGRATING ORAL EVERY 30 MIN PRN
Status: CANCELLED | OUTPATIENT
Start: 2022-05-24

## 2022-05-24 RX ORDER — ONDANSETRON 4 MG/1
4 TABLET, ORALLY DISINTEGRATING ORAL EVERY 6 HOURS PRN
Status: DISCONTINUED | OUTPATIENT
Start: 2022-05-24 | End: 2022-06-07 | Stop reason: HOSPADM

## 2022-05-24 RX ORDER — ONDANSETRON 4 MG/1
4 TABLET, ORALLY DISINTEGRATING ORAL EVERY 6 HOURS PRN
Status: DISCONTINUED | OUTPATIENT
Start: 2022-05-24 | End: 2022-05-24

## 2022-05-24 RX ORDER — ONDANSETRON 2 MG/ML
4 INJECTION INTRAMUSCULAR; INTRAVENOUS EVERY 30 MIN PRN
Status: CANCELLED | OUTPATIENT
Start: 2022-05-24

## 2022-05-24 RX ORDER — LACTULOSE 10 G/15ML
20 SOLUTION ORAL DAILY
Status: DISCONTINUED | OUTPATIENT
Start: 2022-05-25 | End: 2022-05-25

## 2022-05-24 RX ORDER — LIDOCAINE 40 MG/G
CREAM TOPICAL
Status: DISCONTINUED | OUTPATIENT
Start: 2022-05-24 | End: 2022-05-26

## 2022-05-24 RX ORDER — FLUOXETINE 40 MG/1
60 CAPSULE ORAL AT BEDTIME
Status: ON HOLD | COMMUNITY
End: 2022-08-16

## 2022-05-24 RX ORDER — FLUMAZENIL 0.1 MG/ML
0.2 INJECTION, SOLUTION INTRAVENOUS
Status: ACTIVE | OUTPATIENT
Start: 2022-05-24 | End: 2022-05-25

## 2022-05-24 RX ORDER — OXYCODONE HYDROCHLORIDE 5 MG/1
5 TABLET ORAL EVERY 4 HOURS PRN
Status: DISCONTINUED | OUTPATIENT
Start: 2022-05-24 | End: 2022-05-24 | Stop reason: HOSPADM

## 2022-05-24 RX ORDER — FENTANYL CITRATE 50 UG/ML
INJECTION, SOLUTION INTRAMUSCULAR; INTRAVENOUS PRN
Status: DISCONTINUED | OUTPATIENT
Start: 2022-05-24 | End: 2022-05-24

## 2022-05-24 RX ORDER — PROPOFOL 10 MG/ML
INJECTION, EMULSION INTRAVENOUS PRN
Status: DISCONTINUED | OUTPATIENT
Start: 2022-05-24 | End: 2022-05-24

## 2022-05-24 RX ORDER — LIDOCAINE HYDROCHLORIDE 10 MG/ML
INJECTION, SOLUTION INFILTRATION; PERINEURAL PRN
Status: DISCONTINUED | OUTPATIENT
Start: 2022-05-24 | End: 2022-05-24

## 2022-05-24 RX ORDER — DEXTROSE MONOHYDRATE 25 G/50ML
25-50 INJECTION, SOLUTION INTRAVENOUS
Status: DISCONTINUED | OUTPATIENT
Start: 2022-05-24 | End: 2022-06-01

## 2022-05-24 RX ORDER — SODIUM CHLORIDE 9 MG/ML
INJECTION, SOLUTION INTRAVENOUS CONTINUOUS
Status: DISCONTINUED | OUTPATIENT
Start: 2022-05-24 | End: 2022-05-27

## 2022-05-24 RX ORDER — FOLIC ACID 1 MG/1
1 TABLET ORAL DAILY
Status: DISCONTINUED | OUTPATIENT
Start: 2022-05-25 | End: 2022-05-24

## 2022-05-24 RX ORDER — NALOXONE HYDROCHLORIDE 0.4 MG/ML
0.2 INJECTION, SOLUTION INTRAMUSCULAR; INTRAVENOUS; SUBCUTANEOUS
Status: DISCONTINUED | OUTPATIENT
Start: 2022-05-24 | End: 2022-06-07 | Stop reason: HOSPADM

## 2022-05-24 RX ORDER — PROCHLORPERAZINE 25 MG
25 SUPPOSITORY, RECTAL RECTAL EVERY 12 HOURS PRN
Status: DISCONTINUED | OUTPATIENT
Start: 2022-05-24 | End: 2022-06-07 | Stop reason: HOSPADM

## 2022-05-24 RX ORDER — SODIUM CHLORIDE, SODIUM LACTATE, POTASSIUM CHLORIDE, CALCIUM CHLORIDE 600; 310; 30; 20 MG/100ML; MG/100ML; MG/100ML; MG/100ML
INJECTION, SOLUTION INTRAVENOUS CONTINUOUS
Status: CANCELLED | OUTPATIENT
Start: 2022-05-24

## 2022-05-24 RX ORDER — HYDROMORPHONE HCL IN WATER/PF 6 MG/30 ML
0.2 PATIENT CONTROLLED ANALGESIA SYRINGE INTRAVENOUS EVERY 5 MIN PRN
Status: CANCELLED | OUTPATIENT
Start: 2022-05-24

## 2022-05-24 RX ORDER — ONDANSETRON 2 MG/ML
4 INJECTION INTRAMUSCULAR; INTRAVENOUS EVERY 6 HOURS PRN
Status: DISCONTINUED | OUTPATIENT
Start: 2022-05-24 | End: 2022-05-24

## 2022-05-24 RX ORDER — SODIUM CHLORIDE, SODIUM LACTATE, POTASSIUM CHLORIDE, CALCIUM CHLORIDE 600; 310; 30; 20 MG/100ML; MG/100ML; MG/100ML; MG/100ML
INJECTION, SOLUTION INTRAVENOUS CONTINUOUS
Status: DISCONTINUED | OUTPATIENT
Start: 2022-05-24 | End: 2022-05-24 | Stop reason: HOSPADM

## 2022-05-24 RX ORDER — NICOTINE POLACRILEX 4 MG
15-30 LOZENGE BUCCAL
Status: DISCONTINUED | OUTPATIENT
Start: 2022-05-24 | End: 2022-06-01

## 2022-05-24 RX ORDER — HYDROXYZINE HYDROCHLORIDE 25 MG/1
25 TABLET, FILM COATED ORAL EVERY 8 HOURS PRN
Status: DISCONTINUED | OUTPATIENT
Start: 2022-05-24 | End: 2022-06-07 | Stop reason: HOSPADM

## 2022-05-24 RX ORDER — CEFTRIAXONE 1 G/1
1 INJECTION, POWDER, FOR SOLUTION INTRAMUSCULAR; INTRAVENOUS EVERY 24 HOURS
Status: DISCONTINUED | OUTPATIENT
Start: 2022-05-24 | End: 2022-05-26

## 2022-05-24 RX ORDER — ONDANSETRON 2 MG/ML
4 INJECTION INTRAMUSCULAR; INTRAVENOUS EVERY 6 HOURS PRN
Status: DISCONTINUED | OUTPATIENT
Start: 2022-05-24 | End: 2022-06-07 | Stop reason: HOSPADM

## 2022-05-24 RX ORDER — MULTIVITAMIN,THERAPEUTIC
1 TABLET ORAL DAILY
Status: DISCONTINUED | OUTPATIENT
Start: 2022-05-25 | End: 2022-06-07 | Stop reason: HOSPADM

## 2022-05-24 RX ORDER — SODIUM CHLORIDE 9 MG/ML
INJECTION, SOLUTION INTRAVENOUS CONTINUOUS PRN
Status: DISCONTINUED | OUTPATIENT
Start: 2022-05-24 | End: 2022-05-24

## 2022-05-24 RX ORDER — FOLIC ACID 1 MG/1
1 TABLET ORAL DAILY
Status: DISCONTINUED | OUTPATIENT
Start: 2022-05-24 | End: 2022-06-07 | Stop reason: HOSPADM

## 2022-05-24 RX ORDER — FENTANYL CITRATE 50 UG/ML
25 INJECTION, SOLUTION INTRAMUSCULAR; INTRAVENOUS EVERY 5 MIN PRN
Status: CANCELLED | OUTPATIENT
Start: 2022-05-24

## 2022-05-24 RX ORDER — PROCHLORPERAZINE MALEATE 10 MG
10 TABLET ORAL EVERY 6 HOURS PRN
Status: DISCONTINUED | OUTPATIENT
Start: 2022-05-24 | End: 2022-05-24

## 2022-05-24 RX ADMIN — SUCCINYLCHOLINE CHLORIDE 100 MG: 20 INJECTION, SOLUTION INTRAMUSCULAR; INTRAVENOUS; PARENTERAL at 12:01

## 2022-05-24 RX ADMIN — PHENYLEPHRINE HYDROCHLORIDE 200 MG: 10 INJECTION INTRAVENOUS at 12:24

## 2022-05-24 RX ADMIN — SODIUM CHLORIDE: 9 INJECTION, SOLUTION INTRAVENOUS at 22:16

## 2022-05-24 RX ADMIN — PANTOPRAZOLE SODIUM 40 MG: 40 INJECTION, POWDER, FOR SOLUTION INTRAVENOUS at 09:33

## 2022-05-24 RX ADMIN — FOLIC ACID 1 MG: 1 TABLET ORAL at 22:03

## 2022-05-24 RX ADMIN — LIDOCAINE HYDROCHLORIDE 20 MG: 10 INJECTION, SOLUTION INFILTRATION; PERINEURAL at 12:01

## 2022-05-24 RX ADMIN — SODIUM CHLORIDE: 9 INJECTION, SOLUTION INTRAVENOUS at 11:57

## 2022-05-24 RX ADMIN — PANTOPRAZOLE SODIUM 40 MG: 40 INJECTION, POWDER, FOR SOLUTION INTRAVENOUS at 22:04

## 2022-05-24 RX ADMIN — DEXTROSE MONOHYDRATE 25 ML: 25 INJECTION, SOLUTION INTRAVENOUS at 11:20

## 2022-05-24 RX ADMIN — OCTREOTIDE ACETATE 50 MCG/HR: 500 INJECTION, SOLUTION INTRAVENOUS; SUBCUTANEOUS at 03:59

## 2022-05-24 RX ADMIN — SODIUM CHLORIDE: 9 INJECTION, SOLUTION INTRAVENOUS at 12:25

## 2022-05-24 RX ADMIN — OCTREOTIDE ACETATE 50 MCG: 50 INJECTION, SOLUTION INTRAVENOUS; SUBCUTANEOUS at 00:10

## 2022-05-24 RX ADMIN — PROPOFOL 175 MG: 10 INJECTION, EMULSION INTRAVENOUS at 12:01

## 2022-05-24 RX ADMIN — SODIUM CHLORIDE: 9 INJECTION, SOLUTION INTRAVENOUS at 03:58

## 2022-05-24 RX ADMIN — FLUOXETINE HYDROCHLORIDE 40 MG: 20 CAPSULE ORAL at 22:03

## 2022-05-24 RX ADMIN — OCTREOTIDE ACETATE 50 MCG/HR: 500 INJECTION, SOLUTION INTRAVENOUS; SUBCUTANEOUS at 00:17

## 2022-05-24 RX ADMIN — OCTREOTIDE ACETATE 50 MCG/HR: 500 INJECTION, SOLUTION INTRAVENOUS; SUBCUTANEOUS at 04:00

## 2022-05-24 RX ADMIN — Medication 50 MG: at 22:03

## 2022-05-24 RX ADMIN — VANCOMYCIN HYDROCHLORIDE 1250 MG: 5 INJECTION, POWDER, LYOPHILIZED, FOR SOLUTION INTRAVENOUS at 22:16

## 2022-05-24 RX ADMIN — FENTANYL CITRATE 50 MCG: 50 INJECTION, SOLUTION INTRAMUSCULAR; INTRAVENOUS at 12:01

## 2022-05-24 RX ADMIN — PHENYLEPHRINE HYDROCHLORIDE 200 MG: 10 INJECTION INTRAVENOUS at 12:07

## 2022-05-24 RX ADMIN — DEXTROSE MONOHYDRATE 25 ML: 25 INJECTION, SOLUTION INTRAVENOUS at 06:57

## 2022-05-24 ASSESSMENT — ACTIVITIES OF DAILY LIVING (ADL)
ADLS_ACUITY_SCORE: 37
ADLS_ACUITY_SCORE: 37
ADLS_ACUITY_SCORE: 35
ADLS_ACUITY_SCORE: 33
ADLS_ACUITY_SCORE: 37
ADLS_ACUITY_SCORE: 33

## 2022-05-24 NOTE — ANESTHESIA PROCEDURE NOTES
Airway       Patient location during procedure: OR       Procedure Start/Stop Times: 5/24/2022 12:02 PM  Staff -        Anesthesiologist:  Arie Cohn MD       CRNA: Clotilde Guthrie APRN CRNA       Performed By: CRNA  Consent for Airway        Urgency: elective  Indications and Patient Condition       Indications for airway management: jalen-procedural and airway protection       Induction type:intravenous       Mask difficulty assessment: 0 - not attempted    Final Airway Details       Final airway type: endotracheal airway       Successful airway: ETT - single  Endotracheal Airway Details        ETT size (mm): 7.5       Cuffed: yes       Successful intubation technique: direct laryngoscopy       DL Blade Type: Rico 2       Grade View of Cords: 1       Adjucts: stylet and tooth guard       Position: Left       Measured from: gums/teeth       Secured at (cm): 22    Post intubation assessment        Placement verified by: capnometry, equal breath sounds and chest rise        Number of attempts at approach: 1       Number of other approaches attempted: 0       Secured with: silk tape       Ease of procedure: easy       Dentition: Intact and Unchanged    Medication(s) Administered   Medication Administration Time: 5/24/2022 12:02 PM

## 2022-05-24 NOTE — ED NOTES
Patient's mother informed that patient is to take nothing by mouth; she was able to verbalize back no food or drink by mouth at this time.

## 2022-05-24 NOTE — PHARMACY-ADMISSION MEDICATION HISTORY
Pharmacy Note - Admission Medication History    Pertinent Provider Information: Has not been using Novolog insulin at home.       ______________________________________________________________________    Prior To Admission (PTA) med list completed and updated in EMR.       PTA Med List   Medication Sig Last Dose     FLUoxetine (PROZAC) 40 MG capsule Take 40 mg by mouth At Bedtime 5/23/2022 at Unknown time     folic acid (FOLVITE) 1 MG tablet Take 1 tablet (1 mg) by mouth in the morning. 5/23/2022 at Unknown time     furosemide (LASIX) 40 MG tablet Take 1 tablet (40 mg) by mouth daily 5/23/2022 at Unknown time     gabapentin (NEURONTIN) 100 MG capsule Take 1 capsule (100 mg) by mouth At Bedtime (Patient taking differently: Take 100 mg by mouth nightly as needed (Anxiety)) Past Week at Unknown time     hydrOXYzine (ATARAX) 25 MG tablet Take 25 mg by mouth every 8 hours as needed for anxiety  Past Month at Unknown time     insulin glargine (LANTUS SOLOSTAR) 100 UNIT/ML pen Inject 25 Units Subcutaneous every morning (Patient taking differently: Inject 50 Units Subcutaneous At Bedtime) 5/23/2022 at pm     lactulose (CHRONULAC) 10 GM/15ML solution Take 30 mLs (20 g) by mouth 3 times daily 5/23/2022 at Unknown time     magnesium oxide (MAG-OX) 400 MG tablet Take 1 tablet (400 mg) by mouth in the morning and 1 tablet (400 mg) in the evening. 5/23/2022 at Unknown time     multivitamin, therapeutic (THERA-VIT) TABS tablet Take 1 tablet by mouth in the morning. 5/23/2022 at Unknown time     pantoprazole (PROTONIX) 40 MG EC tablet Take 1 tablet (40 mg) by mouth every morning (before breakfast) 5/23/2022 at Unknown time     spironolactone (ALDACTONE) 100 MG tablet Take 1 tablet (100 mg) by mouth daily 5/23/2022 at Unknown time     thiamine (B-1) 100 MG tablet Take 1 tablet (100 mg) by mouth in the morning. 5/23/2022 at Unknown time       Information source(s): Family member and CareEverywhere/SureScripts  Method of interview  communication: phone    Summary of Changes to PTA Med List  New: None  Discontinued: Not taking - Has not been using Novolog insulin at home  Changed: Lantus increased from 25 to 50U, gabapentin changed to prn, Fluoxetine increased from 20 mg.    Patient was asked about OTC/herbal products specifically.  PTA med list reflects this.    In the past week, patient estimated taking medication this percent of the time:  greater than 90%.    Allergies were reviewed, assessed, and updated with the patient.      Patient does not use any multi-dose medications prior to admission.    The information provided in this note is only as accurate as the sources available at the time of the update(s).    Thank you for the opportunity to participate in the care of this patient.    Octavio Drake XOCHITL  5/24/2022 4:15 PM

## 2022-05-24 NOTE — H&P
"Hospital Medicine Service History and Physical  [facility]: [facility]    Dillon Salter is a 26 year old male who  has no past medical history on file. alcohol  Chief Complaint: coffee-ground emesis    Assessment and Plan  Hematemesis  Severe anemia  Thrombocytopenia  Alcoholic cirrhosis  Portal hypertension  Hepatic encephalopathy  Elevated INR  Receiving 2 units transfusion in the emergency  2 episodes hematemesis in the past 24 hours  At this point he is hemodynamically stable  If he deteriorates will place him in the ICU  Gastroenterology consult, for what I received and report plan for EGD today  Recheck hemoglobin after transfusion  NPO, MIVF w NS  Continue PPI and octreotide given history  Encephalopathy appears mild, anticipate resumption of lactulose following EGD  Prn zofran    Leukocytosis  No clinically apparent infection  Started on ceftriaxone in the ED  Exam not consistent with ascending cholangitis and his LFTs are fairly baseline  Exam not consistent with SBP  BCx pending  Ongoing  Abx deferred to GI    Moderate hyponatremia  IVF as above    Hyperkalemia  Am recheck, K free IVF as above    DVTP: held  Code Status: Prior Full  Disposition: Inpatient   Estimated body mass index is 18.72 kg/m  as calculated from the following:    Height as of 4/7/22: 1.676 m (5' 6\").    Weight as of this encounter: 52.6 kg (116 lb).    History of Present Illness  Dillon Salter presents after 2 episodes of coffee-ground emesis earlier today (5/23).  He felt nauseous and went to the sink in the midmorning sounds like he partially induced himself to vomit to improve his nausea.  About 2 hours later he had a second episode also coffee-ground emesis.  Since then he has not had any ongoing nausea, and no further hematemesis.  He chronically has blood crusted lips and issues with bleeding gums.  Provided education to patient in the mother who was present regarding his impaired platelet function and " coagulation.  He states that he has been sober since April 7.  Mother corroborates his state of sobriety.  Denies other drug use.  Patient is displeased with his n.p.o. order.  Of note they also mention he has had decreased stool output taking lactulose at home    ROS + hematemesis, change in bowel habits, jaundice, confusion, bleeding gums  ROS -chest pain, shortness of breath, abdominal pain    All other systems reviewed and are negative  In the ED, patient is afebrile, hemodynamically stable, stable on room air.  Sodium 128, potassium 5.4, significant bilirubin elevation 14.2 with mild transaminitis.  Leukocytosis 20 3K, hemoglobin 5.3, platelets 63.  Chest x-ray is negative    Appears fatigued  Icteric conjunctiva, PERRL  Dried blood covered membranes, clot covered dentition  Trachea midline, no JVD  CTA, Respiratory effort normal on room air  Regular rate and rhythm, no murmur  Abdomen soft, nondistended, nontender  No edema, clubbing of nails minimal  Skin normal temperature, completely jaundiced  Mild asterixis  Fairly normal affect, alert  Vital signs reviewed by me    Wt Readings from Last 4 Encounters:   05/23/22 52.6 kg (116 lb)   05/05/22 54.4 kg (120 lb)   04/12/22 72 kg (158 lb 11.2 oz)        reports that he has quit smoking. He has quit using smokeless tobacco. He reports previous alcohol use. He reports previous drug use. Drug: Marijuana.  family history is not on file. no family history of liver disease   has no past surgical history on file. no prior  Allergies   Allergen Reactions     Latex Swelling       Braulio Negron MD, MPH  [facility]   Phone: #[phone]

## 2022-05-24 NOTE — PROGRESS NOTES
[facility]    Medicine Progress Note - Hospitalist Service    Date of Admission:  5/23/2022    Assessment & Plan        Dillon Salter is a 26-year-old male with history of Autism/Asperger, DM2, anxiety, alcohol abuse x 8.5 years complicated by cirrhosis with portal hypertension and recent hospitalization 04/06-04/12  for aspiration pneumonia, in the setting of alcohol intoxication and alcoholic hepatitis. Admitted to  [facility] on May 23 due to concerns of severe anemia secondary to hematemesis, encephalopathy and general body weakness    Acute on chronic anemia  Macrocytic anemia  -Baseline hemoglobin about 8.2.  Presented with hemoglobin of 5.3, in the setting of upper GI bleeding/hematemesis  -Got 2 units of packed red blood cells in ED. hemoglobin this morning 7.7  -Anemia likely due to chronic liver disease, possibly chronic GI losses  -Add vitamin B12, folate and iron panel to labs  -Daily CBC, will transfuse for hemoglobin less than 7    Upper GI bleeding, secondary to esophageal varices  Bleeding gums, chronic  Alcoholic liver disease, MELD 28  History of alcohol abuse with alcoholic hepatitis  Portal hypertension on imaging  Coagulopathy  Thrombocytopenia  Encephalopathy  Elevated ammonia  -GI consulted, EGD today  -Will advance diet per GI after EGD  -Continue IV PPI twice daily  -Continue IV octreotide infusion  -Continue IV ceftriaxone  -For coagulopathy, obtain fibrinogen activity levels-223 within normal limits  -Will transfuse with platelets, as below 50,000 with bleeding  -We will resume p.o. and rectal lactulose after EGD for encephalopathy, with goal of 3 BMs per day  -When able to take orally, resume home furosemide, spironolactone  -Abdominal ultrasound with duplex, if ascites we will plan for paracentesis to exclude SBP  -Trend CBC LFTs and INR daily    Leukocytosis, unclear source  -On admission WBC count 17,000.  Afebrile.  -On exam, not consistent with  ascending cholangitis or SBP  -Chest x-ray negative  -Urine cultures pending  -Blood cultures pending  -For now continue IV ceftriaxone as above    Moderate hyponatremia, chronic  -Likely in the setting of chronic liver disease   -IVF as above.  Monitor    Hyperkalemia  -Potassium on admission 5.4.  Downtrending to 4.9.  Elevated potassium likely in setting of GI bleeding  -Continue to monitor    Diabetes mellitus.   -On Lantus 50 units at night at home, and aspart 5 units 3 times daily  -Accu-Cheks 3 times daily and nightly  -Sliding scale insulin.  Will resume Lantus once taking a regular diet    History of hypertension and dyslipidemia  -We will monitor here and resume blood pressure meds as able    Autism/Asperger's  Seems well functioning    Hyponatremia: Na = 130 mmol/L (Ref range: 136 - 145 mmol/L) on admission, will monitor as appropriate       Hypoalbuminemia: Albumin = 2.5 g/dL (Ref range: 3.5 - 5.0 g/dL); 2.5 g/dL (Ref range: 3.5 - 5.0 g/dL) on admission, will monitor as appropriate     Coagulation Defect: INR = 2.13 (Ref range: 0.85 - 1.15) and/or PTT = N/A on admission, will monitor for bleeding    Thrombocytopenia: Plts = 39 10e3/uL (Ref range: 150 - 450 10e3/uL) on admission, will monitor for bleeding       Diet: Clear Liquid Diet    DVT Prophylaxis: Pneumatic Compression Devices  Negron Catheter: PRESENT, indication:    Central Lines: None  Cardiac Monitoring: None  Code Status: Full Code      Disposition Plan   Expected Discharge: 3 to 4 days  Anticipated discharge location:  Awaiting care coordination huddle  Delays:  Resolution of encephalopathy, stability of anemia, resolution of potential infection     The patient's care was discussed with the Patient's Family.    Viviana Simpson MD  Hospitalist Service  [facility]  Securely message with the Vocera Web Console (learn more here)  Text page via [employer] Paging/Directory      __________________________________________________________________    Interval History   Patient new to me, care team notes reviewed.  Admitted overnight due to encephalopathy with confusion sleepiness and general lethargy as well as reports of hematemesis.  Patient is very sleepy on my exam but awakens up to respond to questions appropriately.  His mom Leticia is at the bedside and provides most of the history.  Mom endorses a couple of days of increased lethargy and confusion and patient sustained a fall few days ago hurting his knees.  Patient has also been having episodes of retching and dry heaves and had an episode of vomiting yesterday with coffee-ground emesis.  Has not had a recent bowel movement.  Mother states that he has been taking his lactulose.  Mother states patient has not had any recent alcohol use, last drank mid April  Denies cough, shortness of breath, sputum chest pain.   Denies abdominal pain or swelling  Denies dysuria or frequency of micturition  Denies fevers or chills    Data reviewed today: I reviewed all medications, new labs and imaging results over the last 24 hours.     Physical Exam   Vital Signs: Temp: 98  F (36.7  C) Temp src: Oral BP: 130/71 Pulse: 81   Resp: 14 SpO2: 100 % O2 Device: None (Room air) Oxygen Delivery: 10 LPM  Weight: 116 lbs 0 oz  Physical Exam:  General: Very very drowsy, but awakens to answer questions appropriately slowed  HEENT: Dried blood over his lips and some mild blood seen from his upper gums  Skin; jaundiced  CV: RRR, normal S1S2, no murmur, clicks, rubs  Resp: Clear to auscultation bilaterally, no wheezes, rhonchi  Abd: Soft, non-tender, BS+, no masses appreciated  Extremities: Radial and pedal pulses intact and symmetric, no pedal edema  Neuro: Drowsy, has mild asterixis. No lateralizing symptoms or focal neurologic deficits      Data   Recent Labs   Lab 05/24/22  1651 05/24/22  1345 05/24/22  1239 05/24/22  0724 05/24/22  0619 05/23/22  2305  05/23/22 2232   WBC  --   --   --   --  17.1*  --  22.8*   HGB  --   --   --   --  7.7*  --  5.3*   MCV  --   --   --   --  100  --  111*   PLT  --   --   --   --  39*  --  63*   INR  --   --   --   --   --  2.13*  --    NA  --   --   --   --  130*  --  128*   POTASSIUM  --   --   --   --  4.9  --  5.4*   CHLORIDE  --   --   --   --  96*  --  91*   CO2  --   --   --   --  25  --  26   BUN  --   --   --   --  42*  --  46*   CR  --   --   --   --  0.72  --  0.78   ANIONGAP  --   --   --   --  9  --  11   SARTHAK  --   --   --   --  8.8  --  9.4   * 98 113*   < > 42*  --  98   ALBUMIN  --   --   --   --  2.5*  2.5*  --  2.7*   PROTTOTAL  --   --   --   --  8.1*  --  8.8*   BILITOTAL  --   --   --   --  13.2*  --  14.2*   ALKPHOS  --   --   --   --  160*  --  190*   ALT  --   --   --   --  56*  --  63*   AST  --   --   --   --  125*  --  133*    < > = values in this interval not displayed.     Recent Results (from the past 24 hour(s))   XR Chest Port 1 View    Narrative    EXAM: XR CHEST PORT 1 VIEW  LOCATION: [facility]  DATE/TIME: 5/23/2022 11:10 PM    INDICATION: Sepsis.  COMPARISON: CT chest 04/08/2022      Impression    IMPRESSION: Negative chest.   US Abd Hepatic & Portal Vasculature Cmpl    Narrative    [facility]    5/24/2022 10:50 AM    EXAM: US ABD HEPATIC and PORTAL VASCULATURE CMPL     INDICATION: abnormal liver tests, cirrhosis.    COMPARISON: Ultrasound 4/7/2022     TECHNIQUE: Gray-scale and color imaging of the right upper quadrant was performed.    FINDINGS:   PANCREAS: Normal where visualized, without focal mass identified. No pancreatic ductal dilatation is identified.    LIVER: The liver is coarsened in echogenicity with a peripheral nodular contour. Gallbladder sludge is noted biliary ductal dilatation.    OTHER: Small volume free fluid adjacent to the gallbladder.    Velocity (centimeters per second):  Main portal vein: 38  Left portal vein:  20  Right portal vein: 53  Left hepatic vein: 30  Middle hepatic vein: 19  Right hepatic vein: 29  Hepatic artery: 244  Splenic vein at pancreas: 21    Portal vein diameter: 1.2 cm.      Impression    IMPRESSION:   1. Cirrhotic liver morphology.  2. Patent hepatic vessels with appropriately directed flow.  3. Gallbladder sludge without sonographic evidence of cholecystitis.

## 2022-05-24 NOTE — PROGRESS NOTES
"Dillon Salter gives full permission for his mother, Leticia Salter and aunt Onelia Salter to know \"anything about me\" and to pass on information to them.  "

## 2022-05-24 NOTE — ED TRIAGE NOTES
Brought in via EMS; episode of loss of consciousness at home, episode of bladder incontinence. Generalized weakness. EMS and patient's parent state history of cirrhosis.      Triage Assessment     Row Name 05/23/22 9575       Triage Assessment (Adult)    Airway WDL WDL       Respiratory WDL    Respiratory WDL WDL       Skin Circulation/Temperature WDL    Skin Circulation/Temperature WDL X  very jaundiced       Cardiac WDL    Cardiac WDL WDL       Peripheral/Neurovascular WDL    Peripheral Neurovascular WDL WDL       Cognitive/Neuro/Behavioral WDL    Cognitive/Neuro/Behavioral WDL mood/behavior;orientation;speech    Orientation --  confused; unable to give his birth year    Speech slow    Mood/Behavior flat affect;cooperative

## 2022-05-24 NOTE — CONSULTS
"GI CONSULT NOTE      Name: Dillon Salter  Medical Record #: [id number]  YOB: 1995  Date of Admission: 5/23/2022  Date/Time: 5/24/2022/8:54 AM     CHIEF COMPLAINT: Coffee ground emesis and weakness    HISTORY OF PRESENT ILLNESS: We were asked to see Dillon Salter by Dr Negron for evaluation of abnormal liver tests and possible gi bleed.   Dillon Salter is a 26 year old year old male with history of alcohol abuse and findings of cirrhosis with portal hypertension on imaging, anxiety, type 2 diabetes, autism, and recent hospitalization 4/6/22 for aspiration pneumonia, etoh intoxication, and etoh hepatitis. He was discharged from the hospital 4/12/22 and had follow up at the [facility] hepatology clinic with plans for upper endoscopy 6/9/22. The patient has been able to abstain from alcohol (last drank 4/6/22 per patient's mother, Leticia). The patient presented back to [facility] 5/23/22 with encephalopathy, generalized weakness, and acute anemia with Hgb down to 5.3 (Hgb 8.3 4/10/22). Creatinine was not elevated at 0.78 with BUN elevated to 46. Ammonia 40. There were reports of \"coffee ground emesis\" X2 5/23/22 prior to admission. The patient is transfused with 2 units RBCs with hemoglobin up to 7.7 on repeat.  He is afebrile and not tachycardic.  Systolic blood pressures 110-120s.  He was started on Octreotide, IV ppi, and given Ceftriaxone with no further signs of gi bleeding.     The patient is sleepy but responds appropriately.  His mother Leticia is at the bedside to assist with the history of present illness.  Leticia reports her son has been more confused over the last 2 to 3 days.  He has also had generalized weakness and sustained a fall.  He has been eating well. The patient had two episodes of vomiting 5/23/22 with coffee ground emesis.  His last bowel movement was 3 days ago and described as \"pretty solid.  The patient's stool has been dark in color but not black or bloody.  There is no " report of abdominal pain or distention.  No fever or cough.     The patient last drank alcohol 4/6/22. He did participate in a formal chemical dependency treatment program in 2018.   No prior EGD or colonoscopy.   He has been taking a ppi at home. He is also taking Lactulose 2-3X per day along with Furosemide and Aldactone.     The patient was seen by [facility] during hospitalization April 7, 2022.  At that time, meld-na 25.4. He was started on Lactulose for encephalopathy and discharged on Lasix 40mg daily and Spironolactone 100mg daily. DIXIE <1:40, F Actin Ab 28, SMA  IgG 1:20. Ceruoplasmin 21. Abdominal ultrasound 4/6/22 showed hepatomegaly and findings of cirrhosis, but no ascites. CT abdomen and pelvis 4/7/22 showed findings of cirrhosis with splenomegaly and small volume ascites with multiple varices compatible with portal htn.     REVIEW OF SYSTEMS (ROS): Complete review of systems negative other than listed in HPI.    PMH: Type 2 diabetes, etoh use disorder, autism  FAMILY HISTORY:  History reviewed. No pertinent family history.    SOCIAL HISTORY: Lives with mom and dad. Does not drive a motor vehicle.     MEDICATIONS PRIOR TO ADMISSION: (Not in a hospital admission)         ALLERGIES: Latex    PHYSICAL EXAM:    /73   Pulse 58   Temp (!) 96.2  F (35.7  C) (Temporal)   Resp 10   Wt 52.6 kg (116 lb)   SpO2 100%   BMI 18.72 kg/m      GENERAL: Cooperative, sleepy but awakens and responds appropriately,  Mild asterixis.  Dried blood on lips and dentition  NECK: Supple without adenopathy  EYES: scleral icterus  LUNGS: Clear to auscultation bilaterally  HEART: S1S2, no lower extremity edema  ABDOMEN: Non-distended. Positive bowel sounds. Soft, non-tender, no guarding/rebound/mass, no obvious organomegaly  MUSKULOSKELETAL:  Warm and well perfused, moves all extremities well  SKIN:  jaundice  PSYCHIATRIC: Normal affect    LAB DATA:  CMP Results:   Recent Labs   Lab Test 05/24/22  0724  05/24/22  0619 05/23/22 2232 05/05/22  0954   NA  --  130* 128* 123*   POTASSIUM  --  4.9 5.4* 5.0   CHLORIDE  --  96* 91* 82*   CO2  --  25 26 32   ANIONGAP  --  9 11 9   * 42* 98 545*   BUN  --  42* 46* 31*   CR  --  0.72 0.78 0.64*   BILITOTAL  --  13.2* 14.2* 14.5*   ALKPHOS  --  160* 190* 346*   ALT  --  56* 63* 113*   AST  --  125* 133* 120*      CBC  Recent Labs   Lab 05/24/22 0619 05/23/22 2232   WBC 17.1* 22.8*   RBC 2.22* 1.40*   HGB 7.7* 5.3*   HCT 22.2* 15.5*    111*   MCH 34.7* 37.9*   MCHC 34.7 34.2   RDW 18.6* 22.3*   PLT 39* 63*     INR  Recent Labs   Lab 05/23/22 2305   INR 2.13*      Lipase   Date Value Ref Range Status   04/06/2022 45 0 - 52 U/L Final       IMAGING:  EXAM: XR CHEST PORT 1 VIEW  LOCATION: [facility]  DATE/TIME: 5/23/2022 11:10 PM     INDICATION: Sepsis.  COMPARISON: CT chest 04/08/2022                                                                      IMPRESSION: Negative chest.    ASSESSMENT:  Acute on chronic anemia, concern for upper gi bleed in setting of alcohol liver disease  Alcohol abuse history with alcoholic hepatitis.  Cirrhosis and portal htn on imaging (thrombocytopenia and coagulopathy noted)  Leukocytosis- unclear source  Encephalopathy  Type 2 diabetes  Autism/Asperger's    This is a 26 year old year old male with history of alcohol abuse and findings of cirrhosis with portal hypertension on imaging, anxiety, type 2 diabetes, autism, and recent hospitalization 4/6/22 for aspiration pneumonia, etoh intoxication, and etoh hepatitis who presented 5/23/22 with generalized weakness, acute on chronic anemia, abnormal liver tests, and mild encephalopathy. Meld-Na 28.  There were reports of hematemesis/coffee ground emesis X2 5/23/22 raising concern for upper gi bleed. Bleeding from varices in setting of portal htn is certainly possible, but esophagitis/gastritis/ PUD also possible. No further signs of gi bleeding at this time  with appropriate response to 2 units RBCs with Hgb up to 7.7 on repeat. Thrombocytopenia noted-- agree with platelet transfusion. For now, recommend continuation of ppi, Octreotide, and abx prophylaxis with Ceftriaxone. Will arrange for upper endoscopy in the OR later today if timing allows.     Suspect ongoing LFT abnormalities related to etoh hepatitis. Will plan to follow MELD labs. Abdominal ultrasound with duplex. If ascites present, send fluid for analysis.     PLAN:  Continue twice daily ppi, Octreotide, and Ceftriaxone. NPO. Upper endoscopy in OR.  Follow Hgb and transfuse RBCs as needed. Agree with transfusion of platelets.     Continue Lactulose 20g three times daily (once taking po)-- goal 3 BMs per day.  Continue Furosemide, Aldactone, low sodium/high protein diet (once taking po).     Source of leukocytosis not clear-- urine culture pending. Blood culture in process.   Trend LFTs. Abdominal ultrasound with duplex. Paracentesis with fluid analysis if ascites present (no ascites on US 4/2022).   Encourage sobriety. The patient will need additional follow up at [facility] hepatology.    Total time spent on this encounter= 1 hour.   Will discuss with Dr. Hurst who will also visit with the patient.                                                Karlee Chow PA-C  Thank you for the opportunity to participate in the care of this patient.   Please feel free to call me with any questions or concerns.  Phone number [phone].              [facility] Staff Note    The patient was seen and examined and chart reviewed.  I agree with the note as dictated by Karlee Chow PA-C  which outlines the history, physical exam, objective data, and the impression/recommendations.    Patient came to ED for evaluation of syncope.  He has underlying severe alcohol use disorder with cirrhosis and portal hypertension on imaging.  He feels well now.  He has had weakness and confusion. He does have some  encephalopathy.  He has never had endoscopic procedures.  He has underlying autism/Asperger's.  He is usually followed at the [facility].  He was hospitalized in April.  His MELD is 28.    PE poorly kept; alert; jaundiced with loss of muscle mass.  VS noted  Abdomen soft.    Cr normal Bili 13.2  Alb 2.5, Na 130 WBC 17, Hgb 7.7, Platelets 39K    End stage alcoholic cirrhosis with GI bleeding, encephalopathy and poor nutritional status.  We will look for infection, treat encephalopathy, EGD with possible variceal banding and encourage ongoing Chem dep treatment.    I spent 20 minutes reviewing the Epic chart, [facility] records, talking with and examining the patient, synthesizing the data and formulating and impression/recommendations.              Gary Hurst MD  Thank you for the opportunity to participate in the care of this patient.   Please feel free to call me with any questions or concerns.  Phone number [phone].

## 2022-05-24 NOTE — PROCEDURES
See linked EGD report for details of the procedure and recommendations.  Discussed with the patient.    Discussed over the telephone with the patient's mother, Leticia.    Impression:  Large esophageal varix from 30-39 cm.  Blood tinged material in esophagus and proximal stomach.  3 bands placed on the varix in this distribution.    Recommendations:    Continue Octreotide, Ceftriaxone, IV Protonix  Clear liquids only tonight  Lactulose 20 g PO qday beginning tomorrow  Repeat EGD and possible banding in 3-4 weeks.  Our office will call and schedule with the patient.  Reassess treatment    Prognosis is guarded.  Mother will communicate issues with Dr Wood's office.    Gary Hurst MD  [facility]

## 2022-05-24 NOTE — PLAN OF CARE
Patient arrived from PACU following EGD with banding at 1330. Patient awake and alert and oriented x4. Mom present and attentive to patient in the room. Patient reports no pain. Clear liquids tray ordered for patient.     Upon initial skin assessment non-blanchable redness found on the sacrum. Mepilex placed and wound LDA added. Skin and sclera yellow.    Patient with sethi present. Urine dark oxana colored.   Hgb 7.7. Platelets 39. Lips and gums bleeding.     BG check 98.   Leni Arias RN on 5/24/2022 at 2:13 PM        Problem: Adjustment to Illness (Gastrointestinal Bleeding)  Goal: Optimal Coping with Acute Illness  Outcome: Ongoing, Progressing     Problem: Bleeding (Gastrointestinal Bleeding)  Goal: Hemostasis  Outcome: Ongoing, Progressing   Goal Outcome Evaluation:

## 2022-05-24 NOTE — ANESTHESIA POSTPROCEDURE EVALUATION
Patient: Dillon Salter    Procedure: Procedure(s):  ESOPHAGOGASTRODUODENOSCOPY (EGD) with esophageal banding       Anesthesia Type:  General    Note:     Postop Pain Control: Uneventful            Sign Out: Well controlled pain   PONV: No   Neuro/Psych: Uneventful            Sign Out: Acceptable/Baseline neuro status   Airway/Respiratory: Uneventful            Sign Out: Acceptable/Baseline resp. status   CV/Hemodynamics: Uneventful            Sign Out: Acceptable CV status; No obvious hypovolemia; No obvious fluid overload   Other NRE: NONE   DID A NON-ROUTINE EVENT OCCUR? No           Last vitals:  Vitals Value Taken Time   /68 05/24/22 1300   Temp 36.6  C (97.9  F) 05/24/22 1235   Pulse 85 05/24/22 1306   Resp 8 05/24/22 1305   SpO2 100 % 05/24/22 1306   Vitals shown include unvalidated device data.    Electronically Signed By: Arie Cohn MD  May 24, 2022  1:14 PM

## 2022-05-24 NOTE — ANESTHESIA PREPROCEDURE EVALUATION
Anesthesia Pre-Procedure Evaluation    Patient: Dillon Salter   MRN: [id number] : 1995        Procedure : Procedure(s):  ESOPHAGOGASTRODUODENOSCOPY (EGD)          History reviewed. No pertinent past medical history.   History reviewed. No pertinent surgical history.   Allergies   Allergen Reactions     Latex Swelling      Social History     Tobacco Use     Smoking status: Former Smoker     Smokeless tobacco: Former User   Substance Use Topics     Alcohol use: Not Currently      Wt Readings from Last 1 Encounters:   22 52.6 kg (116 lb)        Anesthesia Evaluation   Pt has had prior anesthetic.     No history of anesthetic complications       ROS/MED HX  ENT/Pulmonary:  - neg pulmonary ROS     Neurologic: Comment: Encephalopathy, mild      Cardiovascular:     (+) hypertension-----    METS/Exercise Tolerance:     Hematologic:     (+) anemia (Got 2 units PRBC, geting 1 unit Plt),     Musculoskeletal:       GI/Hepatic:     (+) liver disease (alcoholic cirrhosis, with jaundice & thrombocytopenia, presenting with coffee-ground emesis. No apparent ascites on exam.),     Renal/Genitourinary:  - neg Renal ROS     Endo:     (+) type II DM,     Psychiatric/Substance Use:       Infectious Disease:       Malignancy:       Other:            Physical Exam    Airway        Mallampati: II   TM distance: > 3 FB   Neck ROM: full     Respiratory Devices and Support         Dental     Comment: Fair dentition        Cardiovascular          Rhythm and rate: regular and normal     Pulmonary           breath sounds clear to auscultation           OUTSIDE LABS:  CBC:   Lab Results   Component Value Date    WBC 17.1 (H) 2022    WBC 22.8 (H) 2022    HGB 7.7 (L) 2022    HGB 5.3 (LL) 2022    HCT 22.2 (L) 2022    HCT 15.5 (L) 2022    PLT 39 (LL) 2022    PLT 63 (L) 2022     BMP:   Lab Results   Component Value Date     (L) 2022     (L) 2022    POTASSIUM 4.9  05/24/2022    POTASSIUM 5.4 (H) 05/23/2022    CHLORIDE 96 (L) 05/24/2022    CHLORIDE 91 (L) 05/23/2022    CO2 25 05/24/2022    CO2 26 05/23/2022    BUN 42 (H) 05/24/2022    BUN 46 (H) 05/23/2022    CR 0.72 05/24/2022    CR 0.78 05/23/2022    GLC 87 05/24/2022     (H) 05/24/2022     COAGS:   Lab Results   Component Value Date    PTT 49 (H) 04/06/2022    INR 2.13 (H) 05/23/2022    FIBR 223 05/24/2022     POC: No results found for: BGM, HCG, HCGS  HEPATIC:   Lab Results   Component Value Date    ALBUMIN 2.5 (L) 05/24/2022    ALBUMIN 2.5 (L) 05/24/2022    PROTTOTAL 8.1 (H) 05/24/2022    ALT 56 (H) 05/24/2022     (H) 05/24/2022    ALKPHOS 160 (H) 05/24/2022    BILITOTAL 13.2 (H) 05/24/2022    DESI 40 (H) 05/23/2022     OTHER:   Lab Results   Component Value Date    LACT 0.8 04/08/2022    A1C 7.4 (H) 04/07/2022    SARTHAK 8.8 05/24/2022    PHOS 2.5 04/12/2022    MAG 1.5 (L) 04/11/2022    LIPASE 45 04/06/2022       Anesthesia Plan    ASA Status:  4, emergent    NPO Status:  NPO Appropriate    Anesthesia Type: General.     - Airway: ETT   Induction: RSI.           Consents    Anesthesia Plan(s) and associated risks, benefits, and realistic alternatives discussed. Questions answered and patient/representative(s) expressed understanding.     - Discussed: Risks, Benefits and Alternatives for the PROCEDURE were discussed     - Discussed with:  Patient         Postoperative Care    Pain management: IV analgesics, Oral pain medications.   PONV prophylaxis: Ondansetron (or other 5HT-3), Dexamethasone or Solumedrol     Comments:                Arie Cohn MD

## 2022-05-24 NOTE — CONSULTS
[facility] Pre-Procedure Note    Reason for procedure: Coffee ground emesis and cirrhosis with portal hypertension secondary to alcohol.    History and Physical Reviewed: Reviewed linked H&P--no changes since that exam.    Pre-sedation assessment:    General: alert, appears stated age, and cooperative; blood tinged mucosa  Airway: normal  Heart: regular rate and rhythm  Lungs: clear to auscultation bilaterally  Abdomen:  NT/ND; normal bowel sounds    Sedation Plan based on assessment: Moderate    Mallampati score: Class II (visualization of the soft palate, fauces, and uvula)          ASA Classification: ASA 3 - Patient with moderate systemic disease with functional limitations    Impression: Patient deemed adequate candidate for conscious sedation.    Risks, benefits and alternatives were discussed with the patient and informed consent was obtained.    Plan: esophagogastroduodenoscopy with possible banding              Gary Hurst MD  Thank you for the opportunity to participate in the care of this patient.   Please feel free to call me with any questions or concerns.  Phone number [phone].

## 2022-05-24 NOTE — ANESTHESIA CARE TRANSFER NOTE
Patient: Dillon Salter    Procedure: Procedure(s):  ESOPHAGOGASTRODUODENOSCOPY (EGD) with esophageal banding       Diagnosis: Hematemesis, presence of nausea not specified [K92.0]  Diagnosis Additional Information: No value filed.    Anesthesia Type:   General     Note:    Oropharynx: oropharynx clear of all foreign objects  Level of Consciousness: awake  Oxygen Supplementation: face mask  Level of Supplemental Oxygen (L/min / FiO2): 8  Independent Airway: airway patency satisfactory and stable  Dentition: dentition unchanged  Vital Signs Stable: post-procedure vital signs reviewed and stable  Report to RN Given: handoff report given  Patient transferred to: PACU    Handoff Report: Identifed the Patient, Identified the Reponsible Provider, Reviewed the pertinent medical history, Discussed the surgical course, Reviewed Intra-OP anesthesia mangement and issues during anesthesia, Set expectations for post-procedure period and Allowed opportunity for questions and acknowledgement of understanding      Vitals:  Vitals Value Taken Time   /74 05/24/22 1235   Temp 36.6  C (97.9  F) 05/24/22 1235   Pulse 82 05/24/22 1235   Resp 11 05/24/22 1235   SpO2 100 % 05/24/22 1235       Electronically Signed By: VAMSHI KERR CRNA  May 24, 2022  12:36 PM

## 2022-05-24 NOTE — ED PROVIDER NOTES
EMERGENCY DEPARTMENT ENCOUNTER      NAME: Dillon Salter  AGE: 26 year old male  YOB: 1995  MRN: [id number]  EVALUATION DATE & TIME: 5/23/2022 10:17 PM    PCP: Gary Rodrigues    ED PROVIDER: Andi Walls M.D.      Chief Complaint   Patient presents with     Generalized Weakness     Loss of Consciousness     Jaundice         FINAL IMPRESSION:  1. Hematemesis, presence of nausea not specified    2. Encephalopathy    3. Alcoholic cirrhosis of liver without ascites (H)    4. Anemia, unspecified type          ED COURSE & MEDICAL DECISION MAKING:    Pertinent Labs & Imaging studies reviewed. (See chart for details)  ED Course as of 05/24/22 0211   Mon May 23, 2022   2247 Patient is a 18-year-old gentleman with history of alcoholic cirrhosis, here with confusion, decrease in his normal bowel movements and falls recently.  He had an episode of coffee-ground emesis earlier today.  He is jaundice, confused.  His mother helps provide history.   2248 Hemoglobin came back at 5.3, I ordered 2 units of RBCs.   2256 Ammonia(!): 40     Patient has been given 2 units PRBCs the emergency department.  He has remained stable, no further emesis here, only 1 episode at home and it was coffee-ground earlier today.  Has blood pressure has remained stable, no tachycardia, no fever.  Leukocytosis could be from vomiting however blood cultures were also taken.    Patient's been on octreotide, given Protonix per recommendations from gastroenterology.  They also note that they would come in as needed overnight if he became unstable or had persistent hematemesis.    Confusion, weakness could be anemia, hepatic encephalopathy although ammonia is lower than expected at 40.  SBP is very unlikely, his abdomen is not distended.     Additional ED Course Timestamps:  10:19 PM I met with the patient and his mother (Leticia), obtained history, performed an initial exam, and discussed options and plan for diagnostics and treatment  here in the ED.   10:45 PM Lab updated me on critical lab result of Hgb 5.3. 2 units of pRBCs will be transfused.   11:07 PM I paged for YANNI.   11:21 PM I spoke with YANNI Lee.   11:39 PM I consented the patient for blood transfusion.   12:47 I paged for the hospitalist.  12:51 AM I spoke with Dr. Negron hospitalist, here in the ED and he accepts the patient for admission.     At the conclusion of the encounter I discussed the results of all of the tests and the disposition. The questions were answered. The patient or family acknowledged understanding and was agreeable with the care plan.       60 minutes of critical care time for blood loss anemia requiring 2 units PRBCs, hematemesis in setting of liver failure requiring IV octreotide, Rocephin, consultation to gastroenterology.    MEDICATIONS GIVEN IN THE EMERGENCY:  Medications   octreotide (sandoSTATIN) 1,250 mcg in D5W 250 mL (50 mcg/hr Intravenous New Bag 5/24/22 0017)   pantoprazole (PROTONIX) IV push injection 40 mg (40 mg Intravenous Given 5/23/22 2314)   sodium chloride 0.9% infusion ( Intravenous New Bag 5/23/22 2314)   ondansetron (ZOFRAN) injection 4 mg (4 mg Intravenous Given 5/23/22 2314)   lidocaine (XYLOCAINE) 2 % external gel 10 mL (10 mLs Urethral Given 5/23/22 2300)   cefTRIAXone (ROCEPHIN) 2 g vial to attach to  ml bag for ADULTS or NS 50 ml bag for PEDS (0 g Intravenous Stopped 5/24/22 0009)   octreotide (sandoSTATIN) injection 50 mcg (50 mcg Intravenous Given 5/24/22 0010)         NEW PRESCRIPTIONS STARTED AT TODAY'S ER VISIT  New Prescriptions    No medications on file          =================================================================    HPI    Patient information was obtained from: patient, mother, and EMS    Use of : N/A         Dillon Salter is a 26 year old male with a pertinent history of severe alcohol use disorder (8.5 years of heavy use) with alcoholic cirrhosis and hepatitis, uncontrolled DM II, anemia,  and autism/Asperger's, who presents to this ED via EMS for evaluation of syncope.     Per chart review, patient was recently admitted to [facility] from 4/6-4/12/2022 with alcoholic hepatitis, anasarca, coagulopathy, and thrombocytopenia in the setting of severe alcohol abuse. Alcohol level 225. He was placed on CIWA protocol with gradual improvement. Noted to have gum bleeding and abdominal skin wall bleeding secondary to coagulopathy from chronic liver disease and received two doses of Vitamin K. Meld score 28 and patient was advised complete alcohol cessation with outpatient CD treatment. He was started on Lasix 40 mg daily and Spironolactone 100 mg daily for volume overload and placed on a 2 g Sodium diet. Additionally started on Lactulose 20 g TID. Also noted to have aspiration pneumonia and was treated with IV Ceftazidime and Doxycycline. He was discharged on a one week course of Cefdinir and Metronidazole. He was seen in consult by GI on 5/5 at which time he endorsed BRBPR and increasing weakness. Recommended obtaining abdominal US and upper endoscopy to rule out esophageal varices, but neither have been completed at this time.     Per EMS, patient had a brief syncopal episode at home with loss of bladder control.  en route. No medication given en route.     Per mother, patient has had increasing weakness, confusion, and decreased po intake over the last few days. She notes the patient is requiring assistance with ambulation and fell in the bathroom two days ago. He has also been constipated despite taking Lactulose TID, with his last bowel movement occurring two days ago. Mother notes that the patient has bleeding gums at baseline, however this has become constant over the last three days. Patient had two episodes of vomiting today with coffee ground emesis, which prompted their presentation to the ED via EMS.     Per patient, he currently denies nausea or abdominal pain. He reports he has been  sober since 4/7.              REVIEW OF SYSTEMS   Review of Systems   Constitutional: Positive for appetite change (decreased).   HENT:        Positive for bleeding gums   Gastrointestinal: Positive for constipation and vomiting (coffee ground emesis x2). Negative for abdominal pain and nausea.   Neurological: Positive for weakness (generalized).   Psychiatric/Behavioral: Positive for confusion.   All other systems reviewed and are negative.       PAST MEDICAL HISTORY:  History reviewed. No pertinent past medical history.    PAST SURGICAL HISTORY:  History reviewed. No pertinent surgical history.        CURRENT MEDICATIONS:    Current Facility-Administered Medications   Medication     octreotide (sandoSTATIN) 1,250 mcg in D5W 250 mL     Current Outpatient Medications   Medication     alcohol swab prep pads     blood glucose (NO BRAND SPECIFIED) test strip     blood glucose monitoring (NO BRAND SPECIFIED) meter device kit     FLUoxetine (PROZAC) 20 MG capsule     folic acid (FOLVITE) 1 MG tablet     furosemide (LASIX) 40 MG tablet     gabapentin (NEURONTIN) 100 MG capsule     hydrOXYzine (ATARAX) 25 MG tablet     insulin aspart (NOVOLOG FLEXPEN) 100 UNIT/ML pen     insulin glargine (LANTUS SOLOSTAR) 100 UNIT/ML pen     insulin pen needle (ULTICARE MICRO) 32G X 4 MM miscellaneous     lactulose (CHRONULAC) 10 GM/15ML solution     magnesium oxide (MAG-OX) 400 MG tablet     multivitamin, therapeutic (THERA-VIT) TABS tablet     pantoprazole (PROTONIX) 40 MG EC tablet     spironolactone (ALDACTONE) 100 MG tablet     thiamine (B-1) 100 MG tablet     thin (NO BRAND SPECIFIED) lancets       ALLERGIES:  Allergies   Allergen Reactions     Latex Swelling       FAMILY HISTORY:  History reviewed. No pertinent family history.    SOCIAL HISTORY:   Social History     Socioeconomic History     Marital status: Single   Tobacco Use     Smoking status: Former Smoker     Smokeless tobacco: Former User   Substance and Sexual Activity      Alcohol use: Not Currently     Drug use: Not Currently     Types: Marijuana   Social History Narrative    26-year-old history of autism/Asperger's on the spectrum graduated high school at [employer] worked at [employer] and then another  place until the Covid epidemic in 2020 lives with parents (Leticia and Wes) socially isolated drinks alcohol beer daily        End-stage cirrhosis noted on admission to  April 2022       VITALS:  /58   Pulse 80   Temp 97.4  F (36.3  C)   Resp 20   Wt 52.6 kg (116 lb)   SpO2 100%   BMI 18.72 kg/m      PHYSICAL EXAM    Constitutional: Well developed, well nourished. Jaundiced, comfortable appearing.  HENT: Normocephalic, atraumatic, mucous membranes moist, dried blood on lips and gums, nose normal. Neck- Supple, gross ROM intact.   Eyes: Pupils mid-range, scleral icterus, no discharge.   Respiratory: Clear to auscultation bilaterally, no respiratory distress, no wheezing, speaks full sentences easily. No cough.  Cardiovascular: Tachycardic, regular rhythm, systolic murmur. No pedal edema, 2+ DP pulses.   GI: Soft, no tenderness to deep palpation in all quadrants, no significant abdominal distention, no masses.  Musculoskeletal: Moving all 4 extremities intentionally and without pain. No obvious deformity.  Skin: Warm, dry, no rash, scattered bruising on torso and extremities.  Neurologic: Poor historian, not able to raise his hands and follow commands accurately enough to evaluate for asterixis.   Psychiatric: Affect normal, cooperative.       LAB:  All pertinent labs reviewed and interpreted.  Labs Ordered and Resulted from Time of ED Arrival to Time of ED Departure   CBC WITH PLATELETS - Abnormal       Result Value    WBC Count 22.8 (*)     RBC Count 1.40 (*)     Hemoglobin 5.3 (*)     Hematocrit 15.5 (*)      (*)     MCH 37.9 (*)     MCHC 34.2      RDW 22.3 (*)     Platelet Count 63 (*)    BASIC METABOLIC PANEL - Abnormal    Sodium 128 (*)     Potassium 5.4 (*)      Chloride 91 (*)     Carbon Dioxide (CO2) 26      Anion Gap 11      Urea Nitrogen 46 (*)     Creatinine 0.78      Calcium 9.4      Glucose 98      GFR Estimate >90     HEPATIC FUNCTION PANEL - Abnormal    Bilirubin Total 14.2 (*)     Bilirubin Direct 3.7 (*)     Protein Total 8.8 (*)     Albumin 2.7 (*)     Alkaline Phosphatase 190 (*)      (*)     ALT 63 (*)    AMMONIA - Abnormal    Ammonia 40 (*)    ROUTINE UA WITH MICROSCOPIC REFLEX TO CULTURE - Abnormal    Color Urine Yellow      Appearance Urine Clear      Glucose Urine Negative      Bilirubin Urine Negative      Ketones Urine Negative      Specific Gravity Urine 1.015      Blood Urine Negative      pH Urine 6.0      Protein Albumin Urine Negative      Urobilinogen Urine 3.0 (*)     Nitrite Urine Positive (*)     Leukocyte Esterase Urine Negative      Bacteria Urine Few (*)     RBC Urine 1      WBC Urine 2      Squamous Epithelials Urine 2 (*)    INR - Abnormal    INR 2.13 (*)    ETHYL ALCOHOL LEVEL - Normal    Alcohol, Blood <10     COVID-19 VIRUS (CORONAVIRUS) BY PCR - Normal    SARS CoV2 PCR Negative     TYPE AND SCREEN, ADULT    ABO/RH(D) A NEG      Antibody Screen Negative      SPECIMEN EXPIRATION DATE [id number]     PREPARE RED BLOOD CELLS (UNIT)    CROSSMATCH Compatible      UNIT ABO/RH A Neg      Unit Number [id number]      Unit Status Ready      Blood Component Type Red Blood Cells      Product Code U6967S69      CODING SYSTEM [id number]      UNIT TYPE ISBT 0600     PREPARE RED BLOOD CELLS (UNIT)    CROSSMATCH Compatible      UNIT ABO/RH A Neg      Unit Number [id number]      Unit Status Issued      Blood Component Type Red Blood Cells      Product Code G0490F95      CODING SYSTEM [id number]      UNIT TYPE ISBT 0600      ISSUE DATE AND TIME [id number]     PREPARE RED BLOOD CELLS (UNIT)   BLOOD CULTURE   BLOOD CULTURE   URINE CULTURE   TRANSFUSE RED BLOOD CELLS (UNIT)   ABO/RH TYPE AND SCREEN       RADIOLOGY:  Reviewed all pertinent  imaging. Please see official radiology report.  XR Chest Port 1 View   Final Result   IMPRESSION: Negative chest.          EKG:    All EKG interpretations will be found in ED course above.        I, Luz Elena Bonilla, am serving as a scribe to document services personally performed by Dr. Andi Walls based on my observation and the provider's statements to me. I, Andi Walls MD attest that Luz Elena Bonilla is acting in a scribe capacity, has observed my performance of the services and has documented them in accordance with my direction.    Andi Walls M.D.  Emergency Medicine  [facility] EMERGENCY ROOM  [address] [id number] [phone]  Dept: [phone]      Andi Walls MD  05/24/22 0213

## 2022-05-25 ENCOUNTER — APPOINTMENT (OUTPATIENT)
Dept: RADIOLOGY | Facility: CLINIC | Age: 27
DRG: 432 | End: 2022-05-25
Attending: INTERNAL MEDICINE
Payer: COMMERCIAL

## 2022-05-25 LAB
ALBUMIN SERPL-MCNC: 2.9 G/DL (ref 3.5–5)
ALP SERPL-CCNC: 144 U/L (ref 45–120)
ALT SERPL W P-5'-P-CCNC: 60 U/L (ref 0–45)
ANION GAP SERPL CALCULATED.3IONS-SCNC: 13 MMOL/L (ref 5–18)
AST SERPL W P-5'-P-CCNC: 119 U/L (ref 0–40)
ATRIAL RATE - MUSE: 69 BPM
BILIRUB DIRECT SERPL-MCNC: 4.5 MG/DL
BILIRUB SERPL-MCNC: 18.6 MG/DL (ref 0–1)
BUN SERPL-MCNC: 39 MG/DL (ref 8–22)
CALCIUM SERPL-MCNC: 8.8 MG/DL (ref 8.5–10.5)
CHLORIDE BLD-SCNC: 92 MMOL/L (ref 98–107)
CO2 SERPL-SCNC: 18 MMOL/L (ref 22–31)
CREAT SERPL-MCNC: 0.77 MG/DL (ref 0.7–1.3)
DIASTOLIC BLOOD PRESSURE - MUSE: NORMAL MMHG
ERYTHROCYTE [DISTWIDTH] IN BLOOD BY AUTOMATED COUNT: 21.5 % (ref 10–15)
GFR SERPL CREATININE-BSD FRML MDRD: >90 ML/MIN/1.73M2
GLUCOSE BLD-MCNC: 374 MG/DL (ref 70–125)
GLUCOSE BLDC GLUCOMTR-MCNC: 278 MG/DL (ref 70–99)
GLUCOSE BLDC GLUCOMTR-MCNC: 285 MG/DL (ref 70–99)
GLUCOSE BLDC GLUCOMTR-MCNC: 288 MG/DL (ref 70–99)
GLUCOSE BLDC GLUCOMTR-MCNC: 294 MG/DL (ref 70–99)
GLUCOSE BLDC GLUCOMTR-MCNC: 307 MG/DL (ref 70–99)
GLUCOSE BLDC GLUCOMTR-MCNC: 310 MG/DL (ref 70–99)
GLUCOSE BLDC GLUCOMTR-MCNC: 314 MG/DL (ref 70–99)
GLUCOSE BLDC GLUCOMTR-MCNC: 316 MG/DL (ref 70–99)
GLUCOSE BLDC GLUCOMTR-MCNC: 331 MG/DL (ref 70–99)
GLUCOSE BLDC GLUCOMTR-MCNC: 356 MG/DL (ref 70–99)
HCT VFR BLD AUTO: 21.5 % (ref 40–53)
HGB BLD-MCNC: 7.5 G/DL (ref 13.3–17.7)
INR PPP: 2.32 (ref 0.85–1.15)
INTERPRETATION ECG - MUSE: NORMAL
MAGNESIUM SERPL-MCNC: 2.1 MG/DL (ref 1.8–2.6)
MCH RBC QN AUTO: 34.7 PG (ref 26.5–33)
MCHC RBC AUTO-ENTMCNC: 34.9 G/DL (ref 31.5–36.5)
MCV RBC AUTO: 100 FL (ref 78–100)
P AXIS - MUSE: 54 DEGREES
PHOSPHATE SERPL-MCNC: 4.4 MG/DL (ref 2.5–4.5)
PLATELET # BLD AUTO: 73 10E3/UL (ref 150–450)
POTASSIUM BLD-SCNC: 5.2 MMOL/L (ref 3.5–5)
POTASSIUM BLD-SCNC: 5.5 MMOL/L (ref 3.5–5)
POTASSIUM BLD-SCNC: 6.3 MMOL/L (ref 3.5–5)
POTASSIUM BLD-SCNC: >10 MMOL/L (ref 3.5–5)
PR INTERVAL - MUSE: 206 MS
PROT SERPL-MCNC: 9.2 G/DL (ref 6–8)
QRS DURATION - MUSE: 116 MS
QT - MUSE: 442 MS
QTC - MUSE: 473 MS
R AXIS - MUSE: 23 DEGREES
RBC # BLD AUTO: 2.16 10E6/UL (ref 4.4–5.9)
SODIUM SERPL-SCNC: 123 MMOL/L (ref 136–145)
SODIUM SERPL-SCNC: 124 MMOL/L (ref 136–145)
SODIUM SERPL-SCNC: 124 MMOL/L (ref 136–145)
SYSTOLIC BLOOD PRESSURE - MUSE: NORMAL MMHG
T AXIS - MUSE: 17 DEGREES
VENTRICULAR RATE- MUSE: 69 BPM
WBC # BLD AUTO: 11 10E3/UL (ref 4–11)

## 2022-05-25 PROCEDURE — 250N000013 HC RX MED GY IP 250 OP 250 PS 637: Performed by: INTERNAL MEDICINE

## 2022-05-25 PROCEDURE — 36415 COLL VENOUS BLD VENIPUNCTURE: CPT | Performed by: INTERNAL MEDICINE

## 2022-05-25 PROCEDURE — 258N000003 HC RX IP 258 OP 636: Performed by: INTERNAL MEDICINE

## 2022-05-25 PROCEDURE — 36415 COLL VENOUS BLD VENIPUNCTURE: CPT | Performed by: HOSPITALIST

## 2022-05-25 PROCEDURE — 250N000011 HC RX IP 250 OP 636: Performed by: INTERNAL MEDICINE

## 2022-05-25 PROCEDURE — 250N000013 HC RX MED GY IP 250 OP 250 PS 637: Performed by: STUDENT IN AN ORGANIZED HEALTH CARE EDUCATION/TRAINING PROGRAM

## 2022-05-25 PROCEDURE — C9113 INJ PANTOPRAZOLE SODIUM, VIA: HCPCS | Performed by: INTERNAL MEDICINE

## 2022-05-25 PROCEDURE — 84295 ASSAY OF SERUM SODIUM: CPT | Performed by: HOSPITALIST

## 2022-05-25 PROCEDURE — 83735 ASSAY OF MAGNESIUM: CPT | Performed by: PHYSICIAN ASSISTANT

## 2022-05-25 PROCEDURE — 250N000012 HC RX MED GY IP 250 OP 636 PS 637: Performed by: HOSPITALIST

## 2022-05-25 PROCEDURE — 84132 ASSAY OF SERUM POTASSIUM: CPT | Performed by: HOSPITALIST

## 2022-05-25 PROCEDURE — 82248 BILIRUBIN DIRECT: CPT | Performed by: INTERNAL MEDICINE

## 2022-05-25 PROCEDURE — 84100 ASSAY OF PHOSPHORUS: CPT | Performed by: PHYSICIAN ASSISTANT

## 2022-05-25 PROCEDURE — 73565 X-RAY EXAM OF KNEES: CPT

## 2022-05-25 PROCEDURE — 250N000011 HC RX IP 250 OP 636: Mod: JA | Performed by: INTERNAL MEDICINE

## 2022-05-25 PROCEDURE — 99233 SBSQ HOSP IP/OBS HIGH 50: CPT | Performed by: STUDENT IN AN ORGANIZED HEALTH CARE EDUCATION/TRAINING PROGRAM

## 2022-05-25 PROCEDURE — 93005 ELECTROCARDIOGRAM TRACING: CPT

## 2022-05-25 PROCEDURE — 210N000002 HC R&B HEART CARE

## 2022-05-25 PROCEDURE — 99223 1ST HOSP IP/OBS HIGH 75: CPT | Performed by: INTERNAL MEDICINE

## 2022-05-25 PROCEDURE — 258N000001 HC RX 258: Performed by: HOSPITALIST

## 2022-05-25 PROCEDURE — 85610 PROTHROMBIN TIME: CPT | Performed by: INTERNAL MEDICINE

## 2022-05-25 PROCEDURE — 250N000011 HC RX IP 250 OP 636: Performed by: HOSPITALIST

## 2022-05-25 PROCEDURE — 85027 COMPLETE CBC AUTOMATED: CPT | Performed by: INTERNAL MEDICINE

## 2022-05-25 PROCEDURE — 258N000003 HC RX IP 258 OP 636: Performed by: STUDENT IN AN ORGANIZED HEALTH CARE EDUCATION/TRAINING PROGRAM

## 2022-05-25 PROCEDURE — 36415 COLL VENOUS BLD VENIPUNCTURE: CPT | Performed by: STUDENT IN AN ORGANIZED HEALTH CARE EDUCATION/TRAINING PROGRAM

## 2022-05-25 PROCEDURE — 84132 ASSAY OF SERUM POTASSIUM: CPT | Performed by: STUDENT IN AN ORGANIZED HEALTH CARE EDUCATION/TRAINING PROGRAM

## 2022-05-25 PROCEDURE — 250N000012 HC RX MED GY IP 250 OP 636 PS 637: Performed by: INTERNAL MEDICINE

## 2022-05-25 PROCEDURE — 93010 ELECTROCARDIOGRAM REPORT: CPT | Performed by: INTERNAL MEDICINE

## 2022-05-25 PROCEDURE — 250N000011 HC RX IP 250 OP 636: Performed by: STUDENT IN AN ORGANIZED HEALTH CARE EDUCATION/TRAINING PROGRAM

## 2022-05-25 PROCEDURE — 87040 BLOOD CULTURE FOR BACTERIA: CPT | Performed by: STUDENT IN AN ORGANIZED HEALTH CARE EDUCATION/TRAINING PROGRAM

## 2022-05-25 PROCEDURE — 250N000013 HC RX MED GY IP 250 OP 250 PS 637: Performed by: PHYSICIAN ASSISTANT

## 2022-05-25 PROCEDURE — 87389 HIV-1 AG W/HIV-1&-2 AB AG IA: CPT | Performed by: INTERNAL MEDICINE

## 2022-05-25 PROCEDURE — 258N000003 HC RX IP 258 OP 636: Performed by: HOSPITALIST

## 2022-05-25 RX ORDER — CALCIUM GLUCONATE 94 MG/ML
1 INJECTION, SOLUTION INTRAVENOUS ONCE
Status: DISCONTINUED | OUTPATIENT
Start: 2022-05-25 | End: 2022-05-25

## 2022-05-25 RX ORDER — FUROSEMIDE 10 MG/ML
20 INJECTION INTRAMUSCULAR; INTRAVENOUS ONCE
Status: COMPLETED | OUTPATIENT
Start: 2022-05-25 | End: 2022-05-25

## 2022-05-25 RX ORDER — LIDOCAINE 40 MG/G
CREAM TOPICAL
Status: DISCONTINUED | OUTPATIENT
Start: 2022-05-25 | End: 2022-05-26

## 2022-05-25 RX ORDER — MULTIPLE VITAMINS W/ MINERALS TAB 9MG-400MCG
1 TAB ORAL DAILY
Status: DISCONTINUED | OUTPATIENT
Start: 2022-05-25 | End: 2022-05-26

## 2022-05-25 RX ORDER — LACTULOSE 10 G/15ML
20 SOLUTION ORAL 2 TIMES DAILY
Status: DISCONTINUED | OUTPATIENT
Start: 2022-05-25 | End: 2022-05-28

## 2022-05-25 RX ORDER — DEXTROSE MONOHYDRATE 25 G/50ML
25-50 INJECTION, SOLUTION INTRAVENOUS
Status: DISCONTINUED | OUTPATIENT
Start: 2022-05-25 | End: 2022-06-07 | Stop reason: HOSPADM

## 2022-05-25 RX ORDER — NICOTINE POLACRILEX 4 MG
15-30 LOZENGE BUCCAL
Status: DISCONTINUED | OUTPATIENT
Start: 2022-05-25 | End: 2022-06-07 | Stop reason: HOSPADM

## 2022-05-25 RX ORDER — HEPARIN SODIUM,PORCINE 10 UNIT/ML
2-4 VIAL (ML) INTRAVENOUS
Status: DISCONTINUED | OUTPATIENT
Start: 2022-05-25 | End: 2022-06-07 | Stop reason: HOSPADM

## 2022-05-25 RX ADMIN — LACTULOSE 20 G: 10 SOLUTION ORAL at 20:17

## 2022-05-25 RX ADMIN — OCTREOTIDE ACETATE 50 MCG/HR: 500 INJECTION, SOLUTION INTRAVENOUS; SUBCUTANEOUS at 00:21

## 2022-05-25 RX ADMIN — SODIUM CHLORIDE: 9 INJECTION, SOLUTION INTRAVENOUS at 20:33

## 2022-05-25 RX ADMIN — INSULIN ASPART 3 UNITS: 100 INJECTION, SOLUTION INTRAVENOUS; SUBCUTANEOUS at 12:57

## 2022-05-25 RX ADMIN — FOLIC ACID 1 MG: 1 TABLET ORAL at 07:58

## 2022-05-25 RX ADMIN — THERA TABS 1 TABLET: TAB at 07:58

## 2022-05-25 RX ADMIN — LACTULOSE 20 G: 10 SOLUTION ORAL at 07:58

## 2022-05-25 RX ADMIN — PANTOPRAZOLE SODIUM 40 MG: 40 INJECTION, POWDER, FOR SOLUTION INTRAVENOUS at 20:18

## 2022-05-25 RX ADMIN — INSULIN ASPART 3 UNITS: 100 INJECTION, SOLUTION INTRAVENOUS; SUBCUTANEOUS at 09:05

## 2022-05-25 RX ADMIN — FLUOXETINE HYDROCHLORIDE 40 MG: 20 CAPSULE ORAL at 20:17

## 2022-05-25 RX ADMIN — VANCOMYCIN HYDROCHLORIDE 1250 MG: 5 INJECTION, POWDER, LYOPHILIZED, FOR SOLUTION INTRAVENOUS at 07:58

## 2022-05-25 RX ADMIN — INSULIN ASPART 4 UNITS: 100 INJECTION, SOLUTION INTRAVENOUS; SUBCUTANEOUS at 17:55

## 2022-05-25 RX ADMIN — VANCOMYCIN HYDROCHLORIDE 1250 MG: 5 INJECTION, POWDER, LYOPHILIZED, FOR SOLUTION INTRAVENOUS at 20:18

## 2022-05-25 RX ADMIN — INSULIN GLARGINE 25 UNITS: 100 INJECTION, SOLUTION SUBCUTANEOUS at 07:59

## 2022-05-25 RX ADMIN — CEFTRIAXONE 1 G: 1 INJECTION, POWDER, FOR SOLUTION INTRAMUSCULAR; INTRAVENOUS at 00:08

## 2022-05-25 RX ADMIN — PANTOPRAZOLE SODIUM 40 MG: 40 INJECTION, POWDER, FOR SOLUTION INTRAVENOUS at 07:59

## 2022-05-25 RX ADMIN — Medication 50 MG: at 07:58

## 2022-05-25 RX ADMIN — DEXTROSE MONOHYDRATE 200 ML: 100 INJECTION, SOLUTION INTRAVENOUS at 06:57

## 2022-05-25 RX ADMIN — FUROSEMIDE 20 MG: 10 INJECTION, SOLUTION INTRAMUSCULAR; INTRAVENOUS at 06:58

## 2022-05-25 RX ADMIN — SODIUM CHLORIDE 10 UNITS: 9 INJECTION, SOLUTION INTRAVENOUS at 06:58

## 2022-05-25 ASSESSMENT — ACTIVITIES OF DAILY LIVING (ADL)
TOILETING_ISSUES: NO
DIFFICULTY_EATING/SWALLOWING: NO
ADLS_ACUITY_SCORE: 33
ADLS_ACUITY_SCORE: 22
DIFFICULTY_COMMUNICATING: NO
ADLS_ACUITY_SCORE: 22
ADLS_ACUITY_SCORE: 33
DRESSING/BATHING_DIFFICULTY: NO
DEPENDENT_IADLS:: COOKING;CLEANING;TRANSPORTATION
CONCENTRATING,_REMEMBERING_OR_MAKING_DECISIONS_DIFFICULTY: NO
HEARING_DIFFICULTY_OR_DEAF: NO
WEAR_GLASSES_OR_BLIND: YES
WALKING_OR_CLIMBING_STAIRS_DIFFICULTY: NO
ADLS_ACUITY_SCORE: 33
ADLS_ACUITY_SCORE: 33
CHANGE_IN_FUNCTIONAL_STATUS_SINCE_ONSET_OF_CURRENT_ILLNESS/INJURY: NO
ADLS_ACUITY_SCORE: 33
FALL_HISTORY_WITHIN_LAST_SIX_MONTHS: NO
DOING_ERRANDS_INDEPENDENTLY_DIFFICULTY: NO

## 2022-05-25 NOTE — PROGRESS NOTES
Page regarding elevated potassium  Have ordered hyperkalemia order set  I have addended the dosages of insulin and dextrose given his current hyperglycemia  Expect octreotide with dextrose containing fluid is partially responsible for hyperglycemia  Will give a higher dose of insulin at a lower dose of D10 over 4 hours  Patient can still receive his Lantus when it is due  Giving Lasix as well  Avoiding Kayexalate given GI circumstances currently  We will check an EKG to see if calcium administration is needed  In the circumstance calcium chloride would probably be more efficacious than the poorly metabolized calcium gluconate and a cirrhosis patient  Does not currently have a central line

## 2022-05-25 NOTE — PLAN OF CARE
Goal Outcome Evaluation:    Plan of Care Reviewed With: patient     Overall Patient Progress: improving     Denies pain. NSR with 1st degree AVB. Vitals stable. Family at bedside. Skin and sclera jaundiced. Mepilex to coccyx. Tolerating clear liquid diet. Negron with dark oxana urine.    0600 K 6.3, Total Bilirubin 18.6. Dr. Negron paged and notified. Orders received.

## 2022-05-25 NOTE — PHARMACY-VANCOMYCIN DOSING SERVICE
Pharmacy Vancomycin Initial Note  Date of Service May 24, 2022  Patient's  1995  26 year old, male    Indication: Bacteremia    Current estimated CrCl = Estimated Creatinine Clearance: 113.6 mL/min (based on SCr of 0.72 mg/dL).    Creatinine for last 3 days  2022: 10:32 PM Creatinine 0.78 mg/dL  2022:  6:19 AM Creatinine 0.72 mg/dL    Recent Vancomycin Level(s) for last 3 days  No results found for requested labs within last 72 hours.      Vancomycin IV Administrations (past 72 hours)      No vancomycin orders with administrations in past 72 hours.                Nephrotoxins and other renal medications (From now, onward)    Start     Dose/Rate Route Frequency Ordered Stop    22  vancomycin 1250 mg in 0.9% NaCl 250 mL intermittent infusion 1,250 mg         1,250 mg  over 90 Minutes Intravenous EVERY 12 HOURS 22            Contrast Orders - past 72 hours (72h ago, onward)    None          [employer] Prediction of Planned Initial Vancomycin Regimen  Loading dose: N/A  Regimen: 1250 mg IV every 12 hours.  Start time: 19:27 on 2022  Exposure target: AUC24 (range)400-600 mg/L.hr   AUC24,ss: 576 mg/L.hr  Probability of AUC24 > 400: 84 %  Ctrough,ss: 16.0 mg/L  Probability of Ctrough,ss > 20: 34 %  Probability of nephrotoxicity (Lodise MANUEL ): 11 %          Plan:  1. Start vancomycin  1250 mg IV q12h.   2. Vancomycin monitoring method: AUC  3. Vancomycin therapeutic monitoring goal: 400-600 mg*h/L  4. Pharmacy will check vancomycin levels as appropriate in 1-3 Days.    5. Serum creatinine levels will be ordered daily for the first week of therapy and at least twice weekly for subsequent weeks.      Verito Ramso, RamilaD

## 2022-05-25 NOTE — CONSULTS
Care Management Initial Consult    General Information  Assessment completed with: Patient,    Type of CM/SW Visit: Initial Assessment    Primary Care Provider verified and updated as needed: Yes   Readmission within the last 30 days: no previous admission in last 30 days      Reason for Consult: discharge planning  Advance Care Planning:            Communication Assessment  Patient's communication style: spoken language (English or Bilingual)    Hearing Difficulty or Deaf: no        Cognitive  Cognitive/Neuro/Behavioral: WDL  Level of Consciousness: alert  Arousal Level: opens eyes spontaneously  Orientation: oriented x 4  Mood/Behavior: cooperative     Speech: slow    Living Environment:   People in home: parent(s)     Current living Arrangements: house      Able to return to prior arrangements: yes       Family/Social Support:  Care provided by: self  Provides care for: no one  Marital Status: Single  Parent(s)          Description of Support System: Supportive, Involved         Current Resources:   Patient receiving home care services: No     Community Resources: None  Equipment currently used at home: none  Supplies currently used at home: None    Employment/Financial:  Employment Status: unemployed        Financial Concerns:     Referral to Financial Worker: No       Lifestyle & Psychosocial Needs:  Social Determinants of Health     Tobacco Use: Medium Risk     Smoking Tobacco Use: Former Smoker     Smokeless Tobacco Use: Former User   Alcohol Use: Not on file   Financial Resource Strain: Not on file   Food Insecurity: Not on file   Transportation Needs: Not on file   Physical Activity: Not on file   Stress: Not on file   Social Connections: Not on file   Intimate Partner Violence: Not on file   Depression: At risk     PHQ-2 Score: 6   Housing Stability: Not on file       Functional Status:  Prior to admission patient needed assistance:   Dependent ADLs:: Independent  Dependent IADLs:: Cooking, Cleaning,  Transportation     Values/Beliefs:  Spiritual, Cultural Beliefs, Presybeterian Practices, Values that affect care: no               Additional Information:  SW introduced self and CM services to Pt.  Pt reports living in a private residence with his parents, Wes and Leticia.  Pt is independent at baseline, Pt does not drive and is currently not working.  Pt reports that his mother serves as an advocate for him.  Pt self reported feeling safe both physically and mentally in his living environment and hopes to return home upon discharge.     Per chart review, when Pt was hospitalized at the beginning of April 2022, Pt had out patient treatment set up with [facility].  Pt reports that he is not currently working with anyone in the community and has been sober since April 7th, 2022.     SW informed Pt that CM team will follow care progression to assist as needed with discharge planning.  Pt was appreciative of visit. Family will transport at time of discharge.     ALEXANDER Hawkins

## 2022-05-25 NOTE — PROGRESS NOTES
"  GASTROENTEROLOGY PROGRESS NOTE     SUBJECTIVE   The patient reports feeling well. Tolerating clear liquids. Last BM was a few days ago. Lactulose started this AM.   No signs of gi bleeding. Electrolyte disturbances noted with elevated K, low Na, elevated glucose.      OBJECTIVE     Vitals Blood pressure 132/83, pulse 74, temperature 97.9  F (36.6  C), temperature source Oral, resp. rate 18, height 1.676 m (5' 6\"), weight 57.9 kg (127 lb 11.2 oz), SpO2 100 %.          Physical Exam   General: awake, alert, responds appropriately, +jaundice    Cardiovascular: S1S2, no edema    Chest: lungs are clear     Abdomen: +bs, soft, not tender    Neurologic: grossly intact        LABORATORY    ELECTROLYTE PANEL   Recent Labs   Lab 05/25/22  1002 05/25/22  0904 05/25/22  0832 05/25/22  0819 05/25/22  0731 05/25/22 0439 05/24/22  0724 05/24/22 0619 05/23/22 2232   NA  --   --   --  124*  --  123*  --  130* 128*   POTASSIUM  --   --   --  5.5*  --  6.3*  --  4.9 5.4*   CHLORIDE  --   --   --   --   --  92*  --  96* 91*   CO2  --   --   --   --   --  18*  --  25 26   * 288* 294*  --    < > 374*   < > 42* 98   CR  --   --   --   --   --  0.77  --  0.72 0.78   BUN  --   --   --   --   --  39*  --  42* 46*    < > = values in this interval not displayed.      HEMATOLOGY PANEL   Recent Labs   Lab 05/25/22 0439 05/24/22 0619 05/23/22 2305 05/23/22 2232   HGB 7.5* 7.7*  --  5.3*    100  --  111*   WBC 11.0 17.1*  --  22.8*   PLT 73* 39*  --  63*   INR 2.32*  --  2.13*  --       LIVER AND PANCREAS PANEL   Recent Labs   Lab 05/25/22 0439 05/24/22 0619 05/23/22 2232   * 125* 133*   ALT 60* 56* 63*   ALKPHOS 144* 160* 190*   BILITOTAL 18.6* 13.2* 14.2*       Blood culture 1 of 2 positive for G +Cocci Clusters 5/23/22   Blood culture 5/25/22 pending  Urine culture +50-100K Ecoli  IMAGING STUDIES      EXAM: US ABD HEPATIC and PORTAL VASCULATURE CMPL 5/24/22     INDICATION: abnormal liver tests, " cirrhosis.     COMPARISON: Ultrasound 4/7/2022      TECHNIQUE: Gray-scale and color imaging of the right upper quadrant was performed.     FINDINGS:   PANCREAS: Normal where visualized, without focal mass identified. No pancreatic ductal dilatation is identified.     LIVER: The liver is coarsened in echogenicity with a peripheral nodular contour. Gallbladder sludge is noted biliary ductal dilatation.     OTHER: Small volume free fluid adjacent to the gallbladder.     Velocity (centimeters per second):  Main portal vein: 38  Left portal vein: 20  Right portal vein: 53  Left hepatic vein: 30  Middle hepatic vein: 19  Right hepatic vein: 29  Hepatic artery: 244  Splenic vein at pancreas: 21     Portal vein diameter: 1.2 cm.                                                                      IMPRESSION:   1. Cirrhotic liver morphology.  2. Patent hepatic vessels with appropriately directed flow.  3. Gallbladder sludge without sonographic evidence of cholecystitis.  I have reviewed the current diagnostic and laboratory tests.              EGD 5/23/22  Findings:        Grade II varices were found in the lower third of the esophagus from        30-39 cm. They were 9 mm in largest diameter. There was old blood in the        esophagus. Three bands were successfully placed with complete        eradication, resulting in deflation of varices. The bands were placed at        38 cm, 35 cm and 33 cm along one prominent varix. There was no bleeding        during and at the end of the procedure.        The Z-line was regular and was found 39 cm from the incisors.        Hematin (altered blood/coffee-ground-like material) and old red blood        were found in the gastric body and fundus. No active bleeding was        observed..        The duodenal bulb, first portion of the duodenum and second portion of        the duodenum were normal. There was no blood in the duodenum.   Impression:            - Grade II esophageal varices.  Completely eradicated.                          Banded.                          - Z-line regular, 39 cm from the incisors.                          - Hematin (altered blood/coffee-ground-like material)                          in the gastric body.                          - Normal duodenal bulb, first portion of the duodenum                          and second portion of the duodenum.                          - No specimens collected.   Recommendation:        - [facility]                          - Clear liquid diet today.                          - Continue present medications-Ceftriaxone, Octreotide                          and IV Protonix                          - Consider Lactulose 30 cc PO qday to aim for 3-4                          BM's/day                          - Repeat EGD with General in 3-4 weeks-ordered thru                          [facility]                                                                                       Gary Mary MD   ____________________   GARY MARY,   5/24/2022 12:31:33 PM   IMPRESSION   Upper gi bleed with acute blood loss anemia-- Grade II esophageal varices s/p banding 5/24/22 (blood/coffee-ground material in gastric body). Hgb is stable with no further signs of gi bleeding  Etoh hepatitis w/ cirrhosis complicated by portal htn and encephalopathy-- Total bilirubin is up slightly today. Abd Us with duplex shows cirrhosis, but patent hepatic vessels and no significant ascites. Diuretics currently on hold. MELD-Na 32 (28 5/24/22)  Reports last drank alcohol 4/6/22.     Urinary tract infection with cx + 50-100K E coli. WBCs are down. On Ceftriaxone given gi bleed in setting of cirrhosis.   1 of 2 Blood cultures positive 5/23/22 G+ cocci in clusters, ?contaminent. Blood culture X2 5/25/22 pending  Autism/Aspergers  Type 2 diabetes         PLAN   Continue Octreotide X3-5 days (started 5/23/22).  Continue IV ppi.  Continue  Ceftriaxone X 7days (started 5/23/22).  Ok to advance to full liquids.  Tentatively plan for repeat egd in 3-4 weeks.   Monitor Hgb and transfuse RBCs/platelets as needed.       Monitor renal function closely-- diuretics are currently on hold.  Electrolyte correction per primary team. Will add on phos and Mag.  Follow blood and urine cultures-- defer to primary team (currently on Ceftriaxone and empiric Vanco).   Continue Lactulose-- goal 3-4 BMs per day.   The patient will benefit from physical therapy and consultation with dietician re: low sodium/high protein diet (once diet is advanced).   Encourage ongoing sobriety.   Daily MELD labs.  The patient has f/up at [facility] hepatology arranged for August.         Karlee Chow PA-C  Thank you for the opportunity to participate in the care of this patient.   Please feel free to call me with any questions or concerns.  Phone number [phone].

## 2022-05-25 NOTE — PROGRESS NOTES
[facility]    Medicine Progress Note - Hospitalist Service    Date of Admission:  5/23/2022    Assessment & Plan        Dillon Salter is a 26-year-old male with history of Autism/Asperger, DM2, anxiety, alcohol abuse x 8.5 years complicated by cirrhosis with portal hypertension and recent hospitalization 04/06-04/12  for aspiration pneumonia, in the setting of alcohol intoxication and alcoholic hepatitis. Admitted to  [facility] on May 23 due to concerns of severe anemia secondary to hematemesis, encephalopathy and general body weakness    Acute on chronic anemia  Macrocytic anemia  -Baseline hemoglobin about 8.2.  Presented with hemoglobin of 5.3, in the setting of upper GI bleeding/hematemesis  -Got 2 units of packed red blood cells in ED. hemoglobin this morning 7.7  -Anemia likely due to chronic liver disease, possibly chronic GI losses  -Add vitamin B12, folate and iron panel to labs  -Daily CBC, will transfuse for hemoglobin less than 7    Upper GI bleeding, secondary to esophageal varices  Grade 2 esophageal varices  Bleeding gums, chronic  Alcoholic liver disease, MELD 28  History of alcohol abuse with alcoholic hepatitis  Portal hypertension on imaging  Coagulopathy  Thrombocytopenia  Encephalopathy  Elevated ammonia  -GI consulted, following  -EGD completed May 24 with large esophageal varices, banded  -Advancing diet per GI  -Continue IV PPI twice daily  -Continue IV octreotide infusion  -Continue IV antibiotics as below  -For coagulopathy, checked fibrinogen activity levels-223 within normal limits  -Got platelets transfusion 05/24; will continue to monitor and consider if ongoing bleeding and plts< 50,000   -Resumed p.o. and rectal lactulose, with goal of 3 BMs per day.  If no BM tonight, will do rectal lactulose enema.  -Resumed home furosemide, spironolactone  -Abdominal ultrasound with duplex, cirrhotic liver morphology, no PVT, no ascites, no cholecystitis  -Trend  CBC LFTs and INR daily    Positive blood culture ( 1/2 positive for staph aureus 05/23)  UTI with staph aureus and E. coli  Leukocytosis, improving  -On admission WBC count 17,000.  Afebrile.On exam, not consistent with ascending cholangitis or SBP.   -Chest x-ray negative  -Urine cultures obtained 5/23 now with staph aureus and E. coli, susceptibilities pending  -Blood cultures obtained 5/23, 1/2 with GPC's in clusters, staph aureus, susceptibility pending  -Repeat blood cultures ordered 05/25  -Started IV vancomycin 05/25-  -Continue IV ceftriaxone 05/23-  -Will place ID consult    Moderate hyponatremia, chronic  -Likely in the setting of chronic liver disease   - IVF as above.  Regular diet    Hyperkalemia  -Potassium on admission 5.4. This morning was up to 6.5   -Hyperkalemia protocol, close monitoring  -Insulin and lactulose will likely help tp bring it down  -Keep on telemetry  -Continue to monitor    Hyperglycemia  Diabetes mellitus.   -On Lantus 50 units at night at home, and aspart 5 units 3 times daily  -Accu-Cheks 3 times daily and nightly  -Sliding scale insulin with medium sliding scale  -Increased Lantus to 35 for tomorrow a.m.    History of hypertension and dyslipidemia  -We will monitor here and resume blood pressure meds as able    Autism/Asperger's  Seems well functioning    Hyponatremia: Na = 130 mmol/L (Ref range: 136 - 145 mmol/L) on admission, will monitor as appropriate       Hypoalbuminemia: Albumin = 2.5 g/dL (Ref range: 3.5 - 5.0 g/dL); 2.5 g/dL (Ref range: 3.5 - 5.0 g/dL) on admission, will monitor as appropriate     Coagulation Defect: INR = 2.13 (Ref range: 0.85 - 1.15) and/or PTT = N/A on admission, will monitor for bleeding    Thrombocytopenia: Plts = 79 10e3/uL (Ref range: 150 - 450 10e3/uL) on admission, will monitor for bleeding       Diet: Full Liquid Diet    DVT Prophylaxis: Pneumatic Compression Devices  Negron Catheter: PRESENT, indication:    Central Lines: None  Cardiac  Monitoring: ACTIVE order. Indication: GI bleeding  Code Status: Full Code      Disposition Plan   Expected Discharge: 3 to 4 days  Anticipated discharge location:  Awaiting care coordination huddle  Delays:  Resolution of encephalopathy, stability of anemia, resolution of potential infection     The patient's care was discussed with the Patient's Family.    Viviana Simpson MD  Hospitalist Service  [facility]  Securely message with the Vocera Web Console (learn more here)  Text page via [employer] Paging/Directory     __________________________________________________________________    Interval History   No acute events overnight.  Patient stable after EGD, with no overt bleeding noticed.  Vitals remained stable.  Seen at bedside, with mom present  He appears more alert and conversant today  Mom states that patient has barely had any sleep today but more alert and conversant and less confused.  Still with mild gum bleeding but less than prior  Has not had any bowel movement.  Started lactulose this morning.   Answered multiple questions at bedside.  Plan of care discussed with mom and patient  Patient and mother agreeable to insertion of PICC line.    Data reviewed today: I reviewed all medications, new labs and imaging results over the last 24 hours.     Physical Exam   Vital Signs: Temp: 97.9  F (36.6  C) Temp src: Oral BP: 132/83 Pulse: 74   Resp: 18 SpO2: 100 % O2 Device: None (Room air) Oxygen Delivery: 10 LPM  Weight: 127 lbs 11.2 oz  Physical Exam:  General: Appears more alert today  HEENT: Dried blood over his lips and some mild blood seen from his upper gums  Skin; jaundiced  CV: RRR, normal S1S2, no murmur, clicks, rubs  Resp: Clear to auscultation bilaterally, no wheezes, rhonchi  Abd: Soft, non-tender, BS+, no masses appreciated  Extremities: Radial and pedal pulses intact and symmetric, no pedal edema  Neuro: Drowsy, has mild asterixis. No lateralizing symptoms or focal  neurologic deficits      Data   Recent Labs   Lab 05/25/22  1300 05/25/22  1201 05/25/22  1107 05/25/22  0832 05/25/22  0819 05/25/22  0731 05/25/22  0439 05/24/22  0724 05/24/22  0619 05/23/22  2305 05/23/22  2232   WBC  --   --   --   --   --   --  11.0  --  17.1*  --  22.8*   HGB  --   --   --   --   --   --  7.5*  --  7.7*  --  5.3*   MCV  --   --   --   --   --   --  100  --  100  --  111*   PLT  --   --   --   --   --   --  73*  --  39*  --  63*   INR  --   --   --   --   --   --  2.32*  --   --  2.13*  --    NA  --   --   --   --  124*  --  123*  --  130*  --  128*   POTASSIUM  --   --   --   --  5.5*  --  6.3*  --  4.9  --  5.4*   CHLORIDE  --   --   --   --   --   --  92*  --  96*  --  91*   CO2  --   --   --   --   --   --  18*  --  25  --  26   BUN  --   --   --   --   --   --  39*  --  42*  --  46*   CR  --   --   --   --   --   --  0.77  --  0.72  --  0.78   ANIONGAP  --   --   --   --   --   --  13  --  9  --  11   SARTHAK  --   --   --   --   --   --  8.8  --  8.8  --  9.4   * 331* 356*   < >  --    < > 374*   < > 42*  --  98   ALBUMIN  --   --   --   --   --   --  2.9*  --  2.5*  2.5*  --  2.7*   PROTTOTAL  --   --   --   --   --   --  9.2*  --  8.1*  --  8.8*   BILITOTAL  --   --   --   --   --   --  18.6*  --  13.2*  --  14.2*   ALKPHOS  --   --   --   --   --   --  144*  --  160*  --  190*   ALT  --   --   --   --   --   --  60*  --  56*  --  63*   AST  --   --   --   --   --   --  119*  --  125*  --  133*    < > = values in this interval not displayed.     No results found for this or any previous visit (from the past 24 hour(s)).

## 2022-05-25 NOTE — PROGRESS NOTES
Order for PICC placed. On hold at this time due to pending blood cultures and has a consult in for Infectious disease.

## 2022-05-25 NOTE — PLAN OF CARE
Problem: Adjustment to Illness (Gastrointestinal Bleeding)  Goal: Optimal Coping with Acute Illness  Outcome: Ongoing, Progressing   Pt is A&Ox4. Per patient's mom, pt was up all night and going on 30 hours continuously. Appears very jaundiced and bloody lips and gums. Potassium was 6.3 this morning and post treatment now 5.2. Transitioned to full liquid diet and is tolerating well. Abdomen distended. +BS. BM X1. Continues on lactulose, IVF and octreotide. VSS. Denies pain. Discussed pt's hyperglycemia with Dr. Simpson and updated her that only half of the patient's home dose of lantus was ordered.

## 2022-05-25 NOTE — CONSULTS
Consultation - Infectious Disease  [facility]  Dillon Salter,  1995, MRN [id number]    Admitting Dx: Encephalopathy [G93.40]  Alcoholic cirrhosis of liver without ascites (H) [K70.30]  Anemia, unspecified type [D64.9]  Hematemesis, presence of nausea not specified [K92.0]    PCP: Gary Rodrigues, [phone]       ASSESSMENT   26-year-old man with a history of alcoholic cirrhosis admitted with encephalopathy, jaundice, and upper GI bleeding.    1. Staff aureus bacteremia.  2 of 2 blood cultures on admission growing staph aureus (MSSA on verigene).  Repeat cultures pending.  No obvious source of infection, however immunocompromise due to liver disease.  Possible trigger of encephalopathy and bleeding.  Recent hospitalization, but no indication that the patient had a central line or other invasive procedure.  Mental status is improved since admission.  Denies history of IV drug use.  2. Upper GI bleed.  Patient presenting with coffee-ground emesis.  EGD on  with esophageal varix  3. Liver disease.  Cirrhosis secondary to alcohol use.  Hepatitis B and C work-up negative on previous admission.  Currently sober since last admission.  Elevated INR and low platelets.  Does not have gross ascites.  Encephalopathy on admission, improving.  Takes lactulose at home.  4. Hyponatremia and hyperkalemia  5. UTI.  Urine culture on admission with staph aureus and lower colony counts of E. coli.  No urinary symptoms.  Negron catheter currently in place.    Active Problems:    Encephalopathy    Alcoholic cirrhosis of liver without ascites (H)    Anemia, unspecified type    Hematemesis, presence of nausea not specified       PLAN   -Continue vancomycin, pending susceptibilities from blood and urine  -Continue ceftriaxone  -Daily blood cultures  -TTE  -HIV screen  -Bilateral knee x-rays  -Would not place PICC line until bacteremia clears      Thank you for this consult. Will follow.    Travon Ortiz,  MD [facility]  Direct messaging: "[employer] Paging  On-Call ID provider: [phone], option: [phone]      ===========================================      Chief Complaint   <principal problem not specified>       HPI     We have been requested by Viviana Simpson* to evaluate Dillon Salter for the above.    History obtained by patient and patient's mother    Dillon Salter is a 26 year old man with a history of alcoholism, cirrhosis, autism spectrum disorder who is admitted with encephalopathy, jaundice and coffee-ground emesis.  The patient was hospitalized in April with aspiration pneumonia and alcoholic hepatitis.  He was seen by ID and treated with antibiotics.  He has been living with his parents.  His mother states that he has been taking lactulose regularly and has not been drinking since his last admission.  Overall he has been doing well until about a week ago when his mental status seemed to worsen.  Prior to admission he was more lethargic and weak and vomiting coffee-ground emesis.  Chest x-ray on admission was normal with resolution of his previous pneumonia.  He was not febrile at home or here.  ID was consulted today due to 2 of 2 blood cultures from admission growing Staph aureus.  Urine culture from admission is also growing Staph aureus.  No recent procedures or instrumentation.  He denies IV drug use.  He did have a fall in the bathroom a few days prior to admission where he fell on his knees and has bruises.  He denies any recent dental procedures.              Review of Systems   Ten systems reviewed and negative except for what is noted in the HPI         Medical History  Past Medical History:   Diagnosis Date     Diabetes (H)     Surgical History  He  has a past surgical history that includes Esophagoscopy, gastroscopy, duodenoscopy (EGD), combined (N/A, 5/24/2022).     Social History  Reviewed, and he  reports that he has quit smoking. He has quit using  "smokeless tobacco. He reports previous alcohol use. He reports previous drug use. Drug: Marijuana.  Social History     Social History Narrative    26-year-old history of autism/Asperger's on the spectrum graduated high school at [employer] worked at [employer] and then another  place until the Covid epidemic in 2020 lives with parents (Leticia and Wes) socially isolated drinks alcohol beer daily        End-stage cirrhosis noted on admission to  April 2022     Family History    No recent family infections           Allergies     Allergies   Allergen Reactions     Latex Swelling         Antibiotics   Ceftriaxone 5/23, 5/25-  Vancomycin 5/24-    Previous:  None      Physical Exam     Temp:  [97.5  F (36.4  C)-98.4  F (36.9  C)] 98.3  F (36.8  C)  Pulse:  [70-81] 81  Resp:  [14-20] 18  BP: (121-135)/(60-83) 135/74  SpO2:  [99 %-100 %] 100 %    /74 (BP Location: Left arm)   Pulse 81   Temp 98.3  F (36.8  C) (Oral)   Resp 18   Ht 1.676 m (5' 6\")   Wt 57.9 kg (127 lb 11.2 oz)   SpO2 100%   BMI 20.61 kg/m      GENERAL: Thin man, lying in bed in no acute distress.   HENT:  Head is normocephalic, atraumatic.  Oropharynx is clear, with dried blood  EYES:  Eyes are icteric. No conjunctival injection or stigmata of endocarditis.   NECK:  Supple.  LUNGS:  Clear to auscultation.  CARDIOVASCULAR:  Regular rate and rhythm with no murmurs, gallops or rubs.  ABDOMEN:  Normal bowel sounds, soft, nontender.   EXT: Extremities warm and without edema.  Bilateral bruising on the knees.  Focal tender area on the right over a bruise.  No obvious effusions.  No other joint swelling.  SKIN: Jaundice.  No stigmata of endocarditis.  NEUROLOGIC:  Grossly nonfocal.      Cultures   5/23 urine culture: ,000 colonies E. coli, greater than 100,000 colonies staph aureus  5/23 blood cultures 2 of 2: Staff aureus  5/25 blood cultures x2: Pending      Laboratory results     Recent Labs   Lab 05/25/22  0439 05/24/22  0619 05/23/22 2232 "   WBC 11.0 17.1* 22.8*   HGB 7.5* 7.7* 5.3*   PLT 73* 39* 63*       Recent Labs   Lab 05/25/22  1322 05/25/22  0819 05/25/22  0439 05/24/22  0619 05/23/22  2232   * 124* 123* 130* 128*   CO2  --   --  18* 25 26   BUN  --   --  39* 42* 46*   ALBUMIN  --   --  2.9* 2.5*  2.5* 2.7*   ALKPHOS  --   --  144* 160* 190*   ALT  --   --  60* 56* 63*   AST  --   --  119* 125* 133*       No results for input(s): CRP in the last 168 hours.    Invalid input(s): SEDRATE        Imaging   Radiology results reviewed    XR Chest Port 1 View    Result Date: 5/23/2022  EXAM: XR CHEST PORT 1 VIEW LOCATION: [facility] DATE/TIME: 5/23/2022 11:10 PM INDICATION: Sepsis. COMPARISON: CT chest 04/08/2022     IMPRESSION: Negative chest.    US Abd Hepatic & Portal Vasculature Cmpl    Result Date: 5/24/2022  [facility] 5/24/2022 10:50 AM EXAM: US ABD HEPATIC and PORTAL VASCULATURE CMPL INDICATION: abnormal liver tests, cirrhosis. COMPARISON: Ultrasound 4/7/2022 TECHNIQUE: Gray-scale and color imaging of the right upper quadrant was performed. FINDINGS: PANCREAS: Normal where visualized, without focal mass identified. No pancreatic ductal dilatation is identified. LIVER: The liver is coarsened in echogenicity with a peripheral nodular contour. Gallbladder sludge is noted biliary ductal dilatation. OTHER: Small volume free fluid adjacent to the gallbladder. Velocity (centimeters per second): Main portal vein: 38 Left portal vein: 20 Right portal vein: 53 Left hepatic vein: 30 Middle hepatic vein: 19 Right hepatic vein: 29 Hepatic artery: 244 Splenic vein at pancreas: 21 Portal vein diameter: 1.2 cm.     IMPRESSION: 1. Cirrhotic liver morphology. 2. Patent hepatic vessels with appropriately directed flow. 3. Gallbladder sludge without sonographic evidence of cholecystitis.      Data reviewed today: I reviewed all medications, new labs and imaging results over the last 24 hours. I personally  reviewed the chest x-ray image(s) showing without infiltrate.  The patient's care was discussed with the Patient and Patient's Family.

## 2022-05-25 NOTE — PROGRESS NOTES
"CLINICAL NUTRITION SERVICES - ASSESSMENT NOTE     Nutrition Prescription    RECOMMENDATIONS FOR MDs/PROVIDERS TO ORDER:  rec a low sodium,consistent carbohydrate diet when advanced     Malnutrition Status:    % Weight Loss:  > 20% in 1 year (severe malnutrition)  % Intake:  </= 50% for >/= 1 month (severe malnutrition)  Subcutaneous Fat Loss:  Upper arm region severe depletion, Thoracic region severe depletion and Lumbar region severe depletion  Muscle Loss:  Clavicle bone region severe depletion, Acromion bone region severe depletion and Scapular bone region severe depletion  Fluid Retention:  None noted    Malnutrition Diagnosis: Severe malnutrition  In Context of:  Chronic illness or disease    Recommendations already ordered by Registered Dietitian (RD):  Glucerna chocolate tid   Daily MVI       Future/Additional Recommendations:  None noted      REASON FOR ASSESSMENT  Dillon Salter is a/an 26 year old male assessed by the dietitian for Admission Nutrition Risk Screen for wt loss, reduced po     Pt admitted with anemia, GI bleed, esophageal varices, alcohol abuse, liver disease, autism/aspergers, DM2    NUTRITION HISTORY  NKFA  Pt takes premier protein at home tid, he was on glucerna here prior. He likes chocolate and vanilla   His intake has not been meeting est needs at home    CURRENT NUTRITION ORDERS  Diet: Full Liquid  Intake/Tolerance: 50%    LABS  Labs reviewed, Gluc-356, 331, Na-124, K-5.5    MEDICATIONS  Medications reviewed, folvite, was getting lactulose (now discontinued), thiamine    ANTHROPOMETRICS  Height: 167.6 cm (5' 6\")  Most Recent Weight: 57.9 kg (127 lb 11.2 oz)    IBW: 65 kg  BMI: Normal BMI  Weight History:   Wt Readings from Last 5 Encounters:   05/25/22 57.9 kg (127 lb 11.2 oz)   05/05/22 54.4 kg (120 lb)   04/12/22 72 kg (158 lb 11.2 oz)   2/2/21: 189#     Pt down 62# in the past year (33%)     Dosing Weight: 65 kg    ASSESSED NUTRITION NEEDS  Estimated Energy Needs: 9012-0542 " kcals/day (25 - 30 kcals/kg)  Justification: Maintenance  Estimated Protein Needs: 65-78 grams protein/day (1 - 1.2 grams of pro/kg)  Justification: Maintenance  Estimated Fluid Needs: 6456-1360 mL/day (25 - 30 mL/kg)   Justification: Maintenance    PHYSICAL FINDINGS  See malnutrition section below.  Edema-none noted  GI-distention  Skin-jaundiced, stage 1 pressure sore on sacrum         MALNUTRITION:  % Weight Loss:  > 20% in 1 year (severe malnutrition)  % Intake:  </= 50% for >/= 1 month (severe malnutrition)  Subcutaneous Fat Loss:  Upper arm region severe depletion, Thoracic region severe depletion and Lumbar region severe depletion  Muscle Loss:  Clavicle bone region severe depletion, Acromion bone region severe depletion and Scapular bone region severe depletion  Fluid Retention:  None noted    Malnutrition Diagnosis: Severe malnutrition  In Context of:  Chronic illness or disease    NUTRITION DIAGNOSIS  Malnutrition related to chronic ETOH abuse as evidenced by 33% wt loss in a year, ongoing reduced po <50% of est needs, severe fat and muscle loss      INTERVENTIONS  Implementation  Glucerna chocolate tid   Daily MVI   rec a low sodium,consistent carbohydrate diet when advanced     Goals  Patient to consume % of nutritionally adequate meals three times per day, or the equivalent with supplements/snacks.  Promote appropriate wt gain      Monitoring/Evaluation  Progress toward goals will be monitored and evaluated per protocol.

## 2022-05-26 ENCOUNTER — APPOINTMENT (OUTPATIENT)
Dept: CARDIOLOGY | Facility: CLINIC | Age: 27
DRG: 432 | End: 2022-05-26
Attending: INTERNAL MEDICINE
Payer: COMMERCIAL

## 2022-05-26 LAB
ALBUMIN SERPL-MCNC: 2.8 G/DL (ref 3.5–5)
ALP SERPL-CCNC: 128 U/L (ref 45–120)
ALT SERPL W P-5'-P-CCNC: 55 U/L (ref 0–45)
ANION GAP SERPL CALCULATED.3IONS-SCNC: 9 MMOL/L (ref 5–18)
AST SERPL W P-5'-P-CCNC: 107 U/L (ref 0–40)
BACTERIA BLD CULT: ABNORMAL
BACTERIA BLD CULT: ABNORMAL
BASOPHILS # BLD AUTO: 0 10E3/UL (ref 0–0.2)
BASOPHILS NFR BLD AUTO: 0 %
BILIRUB DIRECT SERPL-MCNC: 5.9 MG/DL
BILIRUB SERPL-MCNC: 21.9 MG/DL (ref 0–1)
BLD PROD TYP BPU: NORMAL
BLOOD COMPONENT TYPE: NORMAL
BUN SERPL-MCNC: 34 MG/DL (ref 8–22)
CALCIUM SERPL-MCNC: 8.8 MG/DL (ref 8.5–10.5)
CHLORIDE BLD-SCNC: 94 MMOL/L (ref 98–107)
CO2 SERPL-SCNC: 24 MMOL/L (ref 22–31)
CODING SYSTEM: NORMAL
CREAT SERPL-MCNC: 0.69 MG/DL (ref 0.7–1.3)
CROSSMATCH: NORMAL
CROSSMATCH: NORMAL
EOSINOPHIL # BLD AUTO: 0 10E3/UL (ref 0–0.7)
EOSINOPHIL NFR BLD AUTO: 0 %
ERYTHROCYTE [DISTWIDTH] IN BLOOD BY AUTOMATED COUNT: 20.1 % (ref 10–15)
ERYTHROCYTE [DISTWIDTH] IN BLOOD BY AUTOMATED COUNT: 20.8 % (ref 10–15)
GFR SERPL CREATININE-BSD FRML MDRD: >90 ML/MIN/1.73M2
GLUCOSE BLD-MCNC: 155 MG/DL (ref 70–125)
GLUCOSE BLDC GLUCOMTR-MCNC: 104 MG/DL (ref 70–99)
GLUCOSE BLDC GLUCOMTR-MCNC: 140 MG/DL (ref 70–99)
GLUCOSE BLDC GLUCOMTR-MCNC: 239 MG/DL (ref 70–99)
GLUCOSE BLDC GLUCOMTR-MCNC: 74 MG/DL (ref 70–99)
HAPTOGLOB SERPL-MCNC: <8 MG/DL (ref 33–171)
HCT VFR BLD AUTO: 16.2 % (ref 40–53)
HCT VFR BLD AUTO: 20.5 % (ref 40–53)
HGB BLD-MCNC: 5.8 G/DL (ref 13.3–17.7)
HGB BLD-MCNC: 7.2 G/DL (ref 13.3–17.7)
HIV 1+2 AB+HIV1 P24 AG SERPL QL IA: NEGATIVE
IMM GRANULOCYTES # BLD: 0.2 10E3/UL
IMM GRANULOCYTES NFR BLD: 1 %
INR PPP: 2.54 (ref 0.85–1.15)
ISSUE DATE AND TIME: NORMAL
LDH SERPL L TO P-CCNC: 823 U/L (ref 125–220)
LVEF ECHO: NORMAL
LYMPHOCYTES # BLD AUTO: 1.3 10E3/UL (ref 0.8–5.3)
LYMPHOCYTES NFR BLD AUTO: 11 %
MCH RBC QN AUTO: 34.3 PG (ref 26.5–33)
MCH RBC QN AUTO: 35.2 PG (ref 26.5–33)
MCHC RBC AUTO-ENTMCNC: 35.1 G/DL (ref 31.5–36.5)
MCHC RBC AUTO-ENTMCNC: 35.8 G/DL (ref 31.5–36.5)
MCV RBC AUTO: 98 FL (ref 78–100)
MCV RBC AUTO: 98 FL (ref 78–100)
MONOCYTES # BLD AUTO: 0.6 10E3/UL (ref 0–1.3)
MONOCYTES NFR BLD AUTO: 5 %
NEUTROPHILS # BLD AUTO: 10 10E3/UL (ref 1.6–8.3)
NEUTROPHILS NFR BLD AUTO: 83 %
NRBC # BLD AUTO: 0 10E3/UL
NRBC BLD AUTO-RTO: 0 /100
PLATELET # BLD AUTO: 34 10E3/UL (ref 150–450)
PLATELET # BLD AUTO: 44 10E3/UL (ref 150–450)
POTASSIUM BLD-SCNC: 5 MMOL/L (ref 3.5–5)
PROT SERPL-MCNC: 8.6 G/DL (ref 6–8)
RBC # BLD AUTO: 1.65 10E6/UL (ref 4.4–5.9)
RBC # BLD AUTO: 2.1 10E6/UL (ref 4.4–5.9)
RETICS # AUTO: 0.3 10E6/UL (ref 0.01–0.11)
RETICS/RBC NFR AUTO: 18.4 % (ref 0.8–2.7)
SODIUM SERPL-SCNC: 127 MMOL/L (ref 136–145)
TOTAL PROTEIN SERUM FOR ELP: 7.2 G/DL (ref 6–8)
UNIT ABO/RH: NORMAL
UNIT NUMBER: NORMAL
UNIT STATUS: NORMAL
UNIT TYPE ISBT: 600
WBC # BLD AUTO: 11.5 10E3/UL (ref 4–11)
WBC # BLD AUTO: 12.1 10E3/UL (ref 4–11)

## 2022-05-26 PROCEDURE — 82248 BILIRUBIN DIRECT: CPT | Performed by: PHYSICIAN ASSISTANT

## 2022-05-26 PROCEDURE — 36569 INSJ PICC 5 YR+ W/O IMAGING: CPT

## 2022-05-26 PROCEDURE — 84166 PROTEIN E-PHORESIS/URINE/CSF: CPT | Performed by: HOSPITALIST

## 2022-05-26 PROCEDURE — 85027 COMPLETE CBC AUTOMATED: CPT | Performed by: PHYSICIAN ASSISTANT

## 2022-05-26 PROCEDURE — C9113 INJ PANTOPRAZOLE SODIUM, VIA: HCPCS | Performed by: INTERNAL MEDICINE

## 2022-05-26 PROCEDURE — 93306 TTE W/DOPPLER COMPLETE: CPT

## 2022-05-26 PROCEDURE — 258N000003 HC RX IP 258 OP 636: Performed by: HOSPITALIST

## 2022-05-26 PROCEDURE — 36415 COLL VENOUS BLD VENIPUNCTURE: CPT | Performed by: INTERNAL MEDICINE

## 2022-05-26 PROCEDURE — P9016 RBC LEUKOCYTES REDUCED: HCPCS | Performed by: HOSPITALIST

## 2022-05-26 PROCEDURE — 250N000011 HC RX IP 250 OP 636: Performed by: INTERNAL MEDICINE

## 2022-05-26 PROCEDURE — 99233 SBSQ HOSP IP/OBS HIGH 50: CPT | Performed by: HOSPITALIST

## 2022-05-26 PROCEDURE — 93306 TTE W/DOPPLER COMPLETE: CPT | Mod: 26 | Performed by: INTERNAL MEDICINE

## 2022-05-26 PROCEDURE — 99232 SBSQ HOSP IP/OBS MODERATE 35: CPT | Performed by: INTERNAL MEDICINE

## 2022-05-26 PROCEDURE — 250N000011 HC RX IP 250 OP 636: Performed by: STUDENT IN AN ORGANIZED HEALTH CARE EDUCATION/TRAINING PROGRAM

## 2022-05-26 PROCEDURE — 83010 ASSAY OF HAPTOGLOBIN QUANT: CPT | Performed by: HOSPITALIST

## 2022-05-26 PROCEDURE — 250N000013 HC RX MED GY IP 250 OP 250 PS 637: Performed by: PHYSICIAN ASSISTANT

## 2022-05-26 PROCEDURE — 83521 IG LIGHT CHAINS FREE EACH: CPT | Performed by: HOSPITALIST

## 2022-05-26 PROCEDURE — 83615 LACTATE (LD) (LDH) ENZYME: CPT | Performed by: HOSPITALIST

## 2022-05-26 PROCEDURE — P9035 PLATELET PHERES LEUKOREDUCED: HCPCS | Performed by: HOSPITALIST

## 2022-05-26 PROCEDURE — 85610 PROTHROMBIN TIME: CPT | Performed by: PHYSICIAN ASSISTANT

## 2022-05-26 PROCEDURE — 250N000011 HC RX IP 250 OP 636: Performed by: HOSPITALIST

## 2022-05-26 PROCEDURE — 84166 PROTEIN E-PHORESIS/URINE/CSF: CPT | Performed by: PATHOLOGY

## 2022-05-26 PROCEDURE — 84165 PROTEIN E-PHORESIS SERUM: CPT | Mod: TC | Performed by: HOSPITALIST

## 2022-05-26 PROCEDURE — 999N000205 HC STATISTICAL VASC ACCESS NURSE TIME, 46-60 MINUTES

## 2022-05-26 PROCEDURE — 87040 BLOOD CULTURE FOR BACTERIA: CPT | Performed by: INTERNAL MEDICINE

## 2022-05-26 PROCEDURE — 210N000002 HC R&B HEART CARE

## 2022-05-26 PROCEDURE — 82310 ASSAY OF CALCIUM: CPT | Performed by: PHYSICIAN ASSISTANT

## 2022-05-26 PROCEDURE — 250N000013 HC RX MED GY IP 250 OP 250 PS 637: Performed by: HOSPITALIST

## 2022-05-26 PROCEDURE — 84155 ASSAY OF PROTEIN SERUM: CPT | Performed by: HOSPITALIST

## 2022-05-26 PROCEDURE — 99222 1ST HOSP IP/OBS MODERATE 55: CPT | Performed by: INTERNAL MEDICINE

## 2022-05-26 PROCEDURE — 258N000003 HC RX IP 258 OP 636: Performed by: INTERNAL MEDICINE

## 2022-05-26 PROCEDURE — 250N000013 HC RX MED GY IP 250 OP 250 PS 637: Performed by: STUDENT IN AN ORGANIZED HEALTH CARE EDUCATION/TRAINING PROGRAM

## 2022-05-26 PROCEDURE — 85045 AUTOMATED RETICULOCYTE COUNT: CPT | Performed by: HOSPITALIST

## 2022-05-26 PROCEDURE — 84165 PROTEIN E-PHORESIS SERUM: CPT | Mod: 26 | Performed by: PATHOLOGY

## 2022-05-26 PROCEDURE — 258N000003 HC RX IP 258 OP 636: Performed by: STUDENT IN AN ORGANIZED HEALTH CARE EDUCATION/TRAINING PROGRAM

## 2022-05-26 PROCEDURE — 85014 HEMATOCRIT: CPT | Performed by: HOSPITALIST

## 2022-05-26 PROCEDURE — 86334 IMMUNOFIX E-PHORESIS SERUM: CPT | Performed by: PATHOLOGY

## 2022-05-26 PROCEDURE — 250N000013 HC RX MED GY IP 250 OP 250 PS 637: Performed by: INTERNAL MEDICINE

## 2022-05-26 PROCEDURE — 86334 IMMUNOFIX E-PHORESIS SERUM: CPT | Performed by: HOSPITALIST

## 2022-05-26 PROCEDURE — 272N000019 HC KIT OPEN ENDED DOUBLE LUMEN

## 2022-05-26 RX ORDER — CEFAZOLIN SODIUM 2 G/100ML
2 INJECTION, SOLUTION INTRAVENOUS EVERY 8 HOURS
Status: DISCONTINUED | OUTPATIENT
Start: 2022-05-26 | End: 2022-06-07 | Stop reason: HOSPADM

## 2022-05-26 RX ORDER — TRAZODONE HYDROCHLORIDE 50 MG/1
50 TABLET, FILM COATED ORAL
Status: DISCONTINUED | OUTPATIENT
Start: 2022-05-26 | End: 2022-06-07 | Stop reason: HOSPADM

## 2022-05-26 RX ORDER — LIDOCAINE 40 MG/G
CREAM TOPICAL
Status: DISCONTINUED | OUTPATIENT
Start: 2022-05-26 | End: 2022-06-07 | Stop reason: HOSPADM

## 2022-05-26 RX ADMIN — PHYTONADIONE 5 MG: 10 INJECTION, EMULSION INTRAMUSCULAR; INTRAVENOUS; SUBCUTANEOUS at 15:47

## 2022-05-26 RX ADMIN — CEFAZOLIN SODIUM 2 G: 2 INJECTION, SOLUTION INTRAVENOUS at 23:53

## 2022-05-26 RX ADMIN — VANCOMYCIN HYDROCHLORIDE 1250 MG: 5 INJECTION, POWDER, LYOPHILIZED, FOR SOLUTION INTRAVENOUS at 09:04

## 2022-05-26 RX ADMIN — OCTREOTIDE ACETATE 50 MCG/HR: 500 INJECTION, SOLUTION INTRAVENOUS; SUBCUTANEOUS at 00:35

## 2022-05-26 RX ADMIN — LACTULOSE 20 G: 10 SOLUTION ORAL at 21:21

## 2022-05-26 RX ADMIN — INSULIN ASPART 2 UNITS: 100 INJECTION, SOLUTION INTRAVENOUS; SUBCUTANEOUS at 17:07

## 2022-05-26 RX ADMIN — CEFTRIAXONE 1 G: 1 INJECTION, POWDER, FOR SOLUTION INTRAMUSCULAR; INTRAVENOUS at 00:35

## 2022-05-26 RX ADMIN — LACTULOSE 20 G: 10 SOLUTION ORAL at 09:03

## 2022-05-26 RX ADMIN — CEFAZOLIN SODIUM 2 G: 2 INJECTION, SOLUTION INTRAVENOUS at 15:38

## 2022-05-26 RX ADMIN — PANTOPRAZOLE SODIUM 40 MG: 40 INJECTION, POWDER, FOR SOLUTION INTRAVENOUS at 19:36

## 2022-05-26 RX ADMIN — THERA TABS 1 TABLET: TAB at 09:03

## 2022-05-26 RX ADMIN — INSULIN ASPART 1 UNITS: 100 INJECTION, SOLUTION INTRAVENOUS; SUBCUTANEOUS at 13:20

## 2022-05-26 RX ADMIN — TRAZODONE HYDROCHLORIDE 50 MG: 50 TABLET ORAL at 23:53

## 2022-05-26 RX ADMIN — Medication 50 MG: at 09:03

## 2022-05-26 RX ADMIN — FOLIC ACID 1 MG: 1 TABLET ORAL at 09:03

## 2022-05-26 RX ADMIN — PANTOPRAZOLE SODIUM 40 MG: 40 INJECTION, POWDER, FOR SOLUTION INTRAVENOUS at 09:02

## 2022-05-26 RX ADMIN — SODIUM CHLORIDE: 9 INJECTION, SOLUTION INTRAVENOUS at 10:56

## 2022-05-26 RX ADMIN — FLUOXETINE HYDROCHLORIDE 40 MG: 20 CAPSULE ORAL at 21:21

## 2022-05-26 ASSESSMENT — ACTIVITIES OF DAILY LIVING (ADL)
ADLS_ACUITY_SCORE: 22
ADLS_ACUITY_SCORE: 23
ADLS_ACUITY_SCORE: 22

## 2022-05-26 NOTE — PROGRESS NOTES
Care Management Follow Up    Length of Stay (days): 2    Expected Discharge Date: 05/29/2022     Concerns to be Addressed:    Medical Progression-monitor labs. ID following-IV antibiotics, daily blood cultures. GI following-IV PPI, Octreotide 3-5 days, monitor hemoglobin.   Patient plan of care discussed at interdisciplinary rounds: Yes    Anticipated Discharge Disposition:  Home     Anticipated Discharge Services:  To be determined  Anticipated Discharge DME:  To be determined     Patient/family educated on Medicare website which has current facility and service quality ratings:  Not at this time  Education Provided on the Discharge Plan:  Yes, per team  Patient/Family in Agreement with the Plan:  Yes    Referrals Placed by CM/SW:  None at this time  Private pay costs discussed: Not applicable    Additional Information:  Chart Reviewed. From home with parents Wes and Leticia; independent at baseline, does not drive. Per previous note patient stated that his mother serves as an advocate for him. Previous note reports patient has been sober since April 7, 2022; had outpatient treatment set up with [facility]. Discharge plan pending progression and recommendations. Family to transport. Care manager to follow.   3:18 PM Referral sent to [facility] for benefits in case needed at discharge.       Amparo Fong, RN

## 2022-05-26 NOTE — PROCEDURES
"MIDLINE Line Insertion Procedure Note    Pt. Name: Dillon Salter  MRN: [id number]          Procedure: Insertion of a  dual Lumen  5 fr  MIDLINE Lot number [id number]    Indications: Access    Contraindications : n/a    Procedure Details   Patient identified with 2 identifiers and \"Time Out\" conducted.  .     MIDLINE insertion bundle followed: hand hygeine performed prior to procedure, site cleansed with cholraprep, hat, mask, sterile gloves,sterile gown worn, patient draped with maximum barrier head to toe drape, sterile field maintained.    The vein was assessed and found to be compressible and of adequate size. 4 ml 1% Lidocaine administered sq to the insertion site. A 5 Fr MIDLINE was inserted into the cephalic vein of the left arm with ultrasound guidance. 1 attempt(s) required to access vein.   Catheter threaded without difficulty. Good blood return noted.    Modified Seldinger Technique used for insertion.    The 8 sharps that are included in the MIDLINE insertion kit were accounted for and disposed of in the sharps container prior to breakdown of the sterile field.    Catheter secured with Statlock, biopatch and Tegaderm dressing applied.    Findings:  Total catheter length  20 cm, with 0 cm exposed. Mid upper arm circumference is 23 cm. Catheter was flushed with 30 cc NS. Patient  tolerated procedure well.    Tip placement in the axilla.    CLABSI prevention brochure left at bedside.    Patient's primary RN notified MIDLINE is ready for use.    Comments:        Orlin Valdez, YAMILKAN, RN  [facility]  "

## 2022-05-26 NOTE — PLAN OF CARE
Pt A&Ox4, VSS. Pt had critical labs this shift, MD aware. Hbg 5.8, pt received 1 of 2 PRBC this shift. Plts 33, 1 of 2 plt transfusion started at end of shift. Pt tolerating infusion well, asymptomatic.   Mother continues to be at bedside.    Problem: Bleeding (Gastrointestinal Bleeding)  Goal: Hemostasis  Outcome: Ongoing, Progressing  Intervention: Manage Gastrointestinal Bleeding  Recent Flowsheet Documentation  Taken 5/26/2022 1618 by Josiah Martinez RN  Bleeding Management:   blood products administered   dressing monitored  Taken 5/26/2022 1216 by Josiah Martinez, CONI  Bleeding Management:   blood products administered   dressing monitored  Taken 5/26/2022 0955 by Josiah Martinez, CONI  Bleeding Management:   blood products administered   dressing monitored   Josiah Martinez RN on 5/26/2022 at 6:54 PM

## 2022-05-26 NOTE — PROGRESS NOTES
Infectious Diseases Progress Note  [facility]    Date of visit: 05/26/2022       ASSESSMENT   26-year-old man with a history of alcoholic cirrhosis admitted with encephalopathy, jaundice, and upper GI bleeding.    1. Staff aureus bacteremia.  2 of 2 blood cultures on admission growing staph aureus (MSSA on verigene).  Repeat cultures negative to date.  No obvious source of infection, however immunocompromise due to liver disease.  Possible trigger of encephalopathy and bleeding.  Recent hospitalization, but no indication that the patient had a central line or other invasive procedure.  Mental status is improved since admission.  Denies history of IV drug use. TTE normal  2. Upper GI bleed.  Patient presenting with coffee-ground emesis.  EGD on 5/24 with esophageal varix  3. Liver disease.  Cirrhosis secondary to alcohol use.  Hepatitis B and C work-up negative on previous admission.  Currently sober since last admission.  Elevated INR and low platelets.  Does not have gross ascites.  Encephalopathy on admission, improving.  Takes lactulose at home.  4. Hyponatremia and hyperkalemia  5. UTI.  Urine culture on admission with staph aureus and lower colony counts of E. coli.  No urinary symptoms.  Negron catheter currently in place.    Active Problems:    Encephalopathy    Alcoholic cirrhosis of liver without ascites (H)    Anemia, unspecified type    Hematemesis, presence of nausea not specified       PLAN   -Discontinue vancomycin  -start cefazolin for MSSA bacteremia. Will also cover E.coli in urine  -discontinue ceftriaxone. Cefazolin is adequate for sbp prophy following UGIB  -continue daily blood cultures to ensure clearance      Travon Ortiz MD  [facility]  Direct messaging: [employer] Paging  On-Call ID provider: [phone], option: 9      ===========================================      SUBJECTIVE / INTERVAL HISTORY:     No events. Feels great. Difficult iv/blood access  "today. Midline placed for access. Tolerating antibiotics. No bleeding      Review of Systems     No fevers, no rashes     Antibiotics   Ceftriaxone 5/23, 5/25-  Vancomycin 5/24-    Previous:  None      Physical Exam     Temp:  [96.7  F (35.9  C)-99.1  F (37.3  C)] 96.7  F (35.9  C)  Pulse:  [78-92] 82  Resp:  [16-20] 18  BP: (128-174)/(60-95) 128/60  SpO2:  [98 %-100 %] 98 %    /60 (BP Location: Right arm)   Pulse 82   Temp (!) 96.7  F (35.9  C) (Axillary)   Resp 18   Ht 1.676 m (5' 6\")   Wt 59 kg (130 lb 1.6 oz)   SpO2 98%   BMI 21.00 kg/m      GENERAL: Thin man, lying in bed in no acute distress.   HENT:  Head is normocephalic, atraumatic.  Oropharynx is clear, with dried blood  EYES:  Eyes are icteric. No conjunctival injection   LUNGS:  Clear to auscultation.  CARDIOVASCULAR:  Regular rate and rhythm with no murmurs, gallops or rubs.  ABDOMEN:  Normal bowel sounds, soft, nontender.   EXT: Extremities warm and without edema.  Bilateral bruising on the knees.  SKIN: Jaundice.  No stigmata of endocarditis.  NEUROLOGIC:  Grossly nonfocal.      Cultures   5/23 urine culture: ,000 colonies E. coli, greater than 100,000 colonies staph aureus  5/23 blood cultures 2 of 2: MSSA  5/25 blood cultures x2: no growth to date  5/26 blood culture: pending      Pertinent Labs:     Recent Labs   Lab 05/26/22  0928 05/25/22  0439 05/24/22  0619   WBC 11.5* 11.0 17.1*   HGB 7.2* 7.5* 7.7*   PLT 44* 73* 39*       Recent Labs   Lab 05/26/22  0928 05/25/22  1322 05/25/22  0819 05/25/22  0439 05/24/22  0619   * 124* 124* 123* 130*   CO2 24  --   --  18* 25   BUN 34*  --   --  39* 42*   ALBUMIN 2.8*  --   --  2.9* 2.5*  2.5*   ALKPHOS 128*  --   --  144* 160*   ALT 55*  --   --  60* 56*   *  --   --  119* 125*       No results for input(s): CRP in the last 168 hours.    Invalid input(s): SEDRATE        Imaging:     Echocardiogram Complete    Result Date: 5/26/2022  [id number] " [id number]^SYLVIE^ARIAN^[place] [address]  Name: EYAL ROONEY MRN: [id number] : 1995 Study Date: 2022 08:16 AM Age: 26 yrs Gender: Male Patient Location: [facility] Reason For Study: Endocarditis Ordering Physician: ARIAN MERCER Performed By: CASTRO  BSA: 1.7 m2 Height: 66 in Weight: 130 lb ______________________________________________________________________________ ______________________________________________________________________________ Interpretation Summary  Left ventricular size, wall motion and function are normal. The ejection fraction is 60-65%. Normal right ventricle size and systolic function. No vegetations seen. No hemodynamically significant valvular abnormalities on 2D or color flow imaging. ______________________________________________________________________________ Left Ventricle Left ventricular size, wall motion and function are normal. The ejection fraction is 60-65%. There is normal left ventricular wall thickness. Left ventricular diastolic function is normal. No regional wall motion abnormalities noted.  Right Ventricle Normal right ventricle size and systolic function.  Atria Normal left atrial size. Right atrial size is normal. There is no color Doppler evidence of an atrial shunt.  Mitral Valve Mitral valve leaflets appear normal. There is no evidence of mitral stenosis or clinically significant mitral regurgitation.  Tricuspid Valve Tricuspid valve leaflets appear normal. There is no evidence of tricuspid stenosis or clinically significant tricuspid regurgitation. Right ventricle systolic pressure estimate normal. There is trace tricuspid regurgitation.  Aortic Valve Aortic valve leaflets appear normal. There is no evidence of aortic stenosis or clinically significant aortic regurgitation.  Pulmonic Valve The pulmonic valve is not well seen, but is grossly normal. This degree of valvular regurgitation is within normal limits.   Vessels The aorta root is normal. Normal size ascending aorta. IVC diameter <2.1 cm collapsing >50% with sniff suggests a normal RA pressure of 3 mmHg.  Pericardium There is no pericardial effusion. ______________________________________________________________________________ MMode/2D Measurements & Calculations IVSd: 0.88 cm  LVIDd: 4.7 cm LVIDs: 3.4 cm LVPWd: 1.3 cm FS: 26.5 % LV mass(C)d: 181.0 grams LV mass(C)dI: 108.7 grams/m2 Ao root diam: 2.6 cm LA dimension: 3.7 cm LA/Ao: 1.4 LVOT diam: 1.8 cm LVOT area: 2.6 cm2 LA Volume Indexed (AL/bp): 27.6 ml/m2 RWT: 0.55  Time Measurements MM HR: 81.0 BPM  Doppler Measurements & Calculations MV E max luca: 127.0 cm/sec MV A max luca: 113.5 cm/sec MV E/A: 1.1 MV max P.2 mmHg MV mean PG: 3.8 mmHg MV V2 VTI: 34.3 cm MVA(VTI): 1.6 cm2 MV dec time: 0.17 sec Ao V2 max: 171.0 cm/sec Ao max P.0 mmHg Ao V2 mean: 120.3 cm/sec Ao mean P.8 mmHg Ao V2 VTI: 37.7 cm THOR(I,D): 1.4 cm2 THOR(V,D): 1.4 cm2 LV V1 max PG: 3.2 mmHg LV V1 max: 89.7 cm/sec LV V1 VTI: 20.9 cm SV(LVOT): 54.5 ml SI(LVOT): 32.7 ml/m2 PA acc time: 0.08 sec TR max luca: 224.0 cm/sec TR max P.1 mmHg AV Luca Ratio (DI): 0.52 THOR Index (cm2/m2): 0.87 E/E' av.7 Lateral E/e': 8.0 Medial E/e': 9.3  ______________________________________________________________________________ Report approved by: Sofya Saucedo 2022 09:55 AM       XR Chest Port 1 View    Result Date: 2022  EXAM: XR CHEST PORT 1 VIEW LOCATION: [facility] DATE/TIME: 2022 11:10 PM INDICATION: Sepsis. COMPARISON: CT chest 2022     IMPRESSION: Negative chest.    XR Knee AP Standing Bilateral    Result Date: 2022  EXAM: XR KNEE AP STANDING BILATERAL LOCATION: [facility] DATE/TIME: 2022 6:03 PM INDICATION: recent trauma, bacteremia COMPARISON: None.     IMPRESSION: RIGHT KNEE: Normal joint spaces and alignment. No fracture. LEFT KNEE: Normal joint spaces  and alignment. No fracture.    US Abd Hepatic & Portal Vasculature Cmpl    Result Date: 5/24/2022  [facility]  5/24/2022 10:50 AM EXAM: US ABD HEPATIC and PORTAL VASCULATURE CMPL INDICATION: abnormal liver tests, cirrhosis. COMPARISON: Ultrasound 4/7/2022 TECHNIQUE: Gray-scale and color imaging of the right upper quadrant was performed. FINDINGS: PANCREAS: Normal where visualized, without focal mass identified. No pancreatic ductal dilatation is identified. LIVER: The liver is coarsened in echogenicity with a peripheral nodular contour. Gallbladder sludge is noted biliary ductal dilatation. OTHER: Small volume free fluid adjacent to the gallbladder. Velocity (centimeters per second): Main portal vein: 38 Left portal vein: 20 Right portal vein: 53 Left hepatic vein: 30 Middle hepatic vein: 19 Right hepatic vein: 29 Hepatic artery: 244 Splenic vein at pancreas: 21 Portal vein diameter: 1.2 cm.     IMPRESSION: 1. Cirrhotic liver morphology. 2. Patent hepatic vessels with appropriately directed flow. 3. Gallbladder sludge without sonographic evidence of cholecystitis.        Data reviewed today: I reviewed all medications, new labs and imaging results over the last 24 hours. I personally reviewed the knee xr image(s) showing no fractures.  The patient's care was discussed with the Bedside Nurse and Patient.

## 2022-05-26 NOTE — PROGRESS NOTES
Hospitalist follow up note:    Called with critical lab results. hgb of 5.8, plts of 34. Will order 2 units of blood, 2 units of platelets. Will order 5mg vitamin K. Reviewed with RN, vitals remain normal. No melena or hematochezia. Will check haptoglobin, LDH. Sent SPEP/UPEP, serum free light chains, immunofixation, peripheral smear given his unusually elevated total protein:albumin ratio. Will ask for hematology review.     Serjio Patel DO   Pager #: [phone]

## 2022-05-26 NOTE — PROGRESS NOTES
[facility] Medicine PROGRESS NOTE      Identification/Summary:   Dillon Salter is a 26-year-old male with history of Autism/Asperger, DM2, anxiety, alcohol abuse x 8.5 years complicated by cirrhosis with portal hypertension and recent hospitalization 04/06-04/12  for aspiration pneumonia, in the setting of alcohol intoxication and alcoholic hepatitis. Admitted to  [facility] on May 23 due to concerns of severe anemia secondary to hematemesis, encephalopathy and general body weakness.    As an outpatient he was prescribed fluoxetine, folic acid, Lasix 40 mg daily, gabapentin 100 at bedtime, hydroxyzine as needed, Lantus 50 units at bedtime, NovoLog 5 units 3 times daily with meals +1 unit per 15 g carbohydrate, lactulose 20 g 3 times daily, Protonix daily, spironolactone 100 daily, thiamine.    Unable to get labs this morning, very difficult vascular access. Vitals with nl HR, elevated BP. Sodium yest afternoon was 124, potassium was 5.2, hgb yest morning was 7.5, plts were 73.     Assessment and Plan:  Acute on chronic anemia  Macrocytic anemia  -Baseline hemoglobin about 8.2.  Presented with hemoglobin of 5.3, in the setting of upper GI bleeding/hematemesis  -Got 2 units of packed red blood cells in ED. hemoglobin pending this morning  -Anemia likely due to chronic liver disease, possibly chronic GI losses  -Add vitamin B12, folate and iron panel to labs  -transfuse for hgb below 7     Upper GI bleeding, secondary to esophageal varices  Bleeding gums, chronic  Alcoholic liver disease, MELD 28  History of alcohol abuse with alcoholic hepatitis  Portal hypertension on imaging  Coagulopathy  Thrombocytopenia  Encephalopathy  Elevated ammonia  Severe malnutrition  -appreciate GI help  -EGD completed May 24 with large esophageal varices, banded  -Advancing diet per GI  -Continue IV PPI twice daily  -Continue IV octreotide infusion  -Continue IV antibiotics as below  -Got platelets transfusion 05/24;  transfuse for bleeding and plts< 50,000   -Resumed p.o. and rectal lactulose, with goal of 3 BMs per day.    -Holding home furosemide, spironolactone  -Abdominal ultrasound with duplex, cirrhotic liver morphology, no PVT, no ascites, no cholecystitis  -Trend CBC LFTs and INR daily  -Possibly start nadolol, appreciate GI thoughts     MSSA bacteremia  UTI with staph aureus and E. coli  -On admission WBC count 17,000.  Afebrile.On exam, not consistent with ascending cholangitis or SBP.   -Chest x-ray negative  -Urine cultures obtained 5/23 now with staph aureus and E. coli, MSSA, pansensitive E. coli  -Blood cultures obtained 5/23 both positive for gram-positive cocci in clusters  -Repeat blood cultures ordered 05/25, 5/20  -Started IV vancomycin 05/25-  -Continue IV ceftriaxone 05/23-  -appreciate infectious disease consult  -Would like to place PICC or midline but not sure if we can given the positive blood cultures 2 days ago     Moderate hyponatremia, acute on chronic, baseline sodium likely around 130  -Likely in the setting of chronic liver disease   -Sodium pending this morning  -Currently getting normal saline at 100/h, will decrease to 50/h  -Clinically not particularly hypervolemic     Hyperkalemia  -Potassium on admission 5.4.   Subsequently up to 6.5   -Hyperkalemia protocol, close monitoring  -Discontinue spironolactone for now  -Keep on telemetry  -Continue to monitor     Hyperglycemia  Diabetes mellitus.   -On Lantus 50 units at night at home, and aspart 5 units 3 times daily  -Accu-Cheks 3 times daily and nightly  -Sliding scale insulin with medium sliding scale, add carb counts as he does at home  -Increased Lantus to 35 this morning     History of hypertension and dyslipidemia  -We will monitor here and resume blood pressure meds as able  -Possibly start nadolol     Autism/Asperger's  -Difficult to assess how severe his disease is in this setting, suspect it is pretty severe      Interval  History/Subjective:  Denies any pain, nausea, vomiting.  Has had some loose stools, no melena or hematochezia.  No vomiting.  Denies chest pain or shortness of breath.  Denies any difficulty swallowing.    Physical Exam/Objective:  Gen: Very malnourished appearing jaundiced young man though in no acute distress  ENT: Remarkable scleral icterus  Pulm: lungs are clear at apices, diminished at bases, breathing comfortably at rest  CV: regular rate and rhythm, trace lower extremity edema  GI: abdomen is soft, no tenderness to palpation  Derm: Remarkably jaundiced  Psych: Flat affect    Medications:     cefTRIAXone  1 g Intravenous Q24H     FLUoxetine  40 mg Oral At Bedtime     folic acid  1 mg Oral Daily     insulin aspart  1-7 Units Subcutaneous TID AC     insulin aspart  1-5 Units Subcutaneous At Bedtime     insulin glargine  35 Units Subcutaneous QAM     lactulose  20 g Oral BID     multivitamin w/minerals  1 tablet Oral Daily     multivitamin, therapeutic  1 tablet Oral Daily     pantoprazole (PROTONIX) IV  40 mg Intravenous BID     sodium chloride (PF)  3 mL Intracatheter Q8H     thiamine  50 mg Oral Daily     vancomycin  1,250 mg Intravenous Q12H       Serjio Patel DO   Hospitalist  [facility]

## 2022-05-26 NOTE — PLAN OF CARE
"Pt A&Ox4, VSS, RA HR NSR 60-80. Denies pain. Pt awake until 2 am. Moderate UOP. Lg loose brown stools x2 lactalose given. Pt doing well cognitively. Re-evaluate need for parent in room at all times. Awaiting labs. Will continue to monitor and carry out plan of care. Noa Hoskins RN                   Problem: Plan of Care - These are the overarching goals to be used throughout the patient stay.    Goal: Plan of Care Review/Shift Note  Description: The Plan of Care Review/Shift note should be completed every shift.  The Outcome Evaluation is a brief statement about your assessment that the patient is improving, declining, or no change.  This information will be displayed automatically on your shift note.  Outcome: Ongoing, Progressing  Goal: Patient-Specific Goal (Individualized)  Description: You can add care plan individualizations to a care plan. Examples of Individualization might be:  \"Parent requests to be called daily at 9am for status\", \"I have a hard time hearing out of my right ear\", or \"Do not touch me to wake me up as it startles me\".  Outcome: Ongoing, Progressing  Goal: Absence of Hospital-Acquired Illness or Injury  Outcome: Ongoing, Progressing  Intervention: Identify and Manage Fall Risk  Recent Flowsheet Documentation  Taken 5/26/2022 0400 by Noa Hoskins RN  Safety Promotion/Fall Prevention: activity supervised  Taken 5/26/2022 0000 by Noa Hoskins RN  Safety Promotion/Fall Prevention: activity supervised  Taken 5/25/2022 2000 by Noa Hoskins RN  Safety Promotion/Fall Prevention: activity supervised  Intervention: Prevent Skin Injury  Recent Flowsheet Documentation  Taken 5/26/2022 0400 by Noa Hoskins RN  Body Position: position changed independently  Taken 5/26/2022 0000 by Noa Hoskins RN  Body Position: position changed independently  Taken 5/25/2022 2000 by Noa Hoskins RN  Body Position: position changed independently  Intervention: Prevent and " Manage VTE (Venous Thromboembolism) Risk  Recent Flowsheet Documentation  Taken 5/26/2022 0400 by Noa Hoskins, RN  Activity Management: activity adjusted per tolerance  Taken 5/26/2022 0000 by Noa Hoskins, RN  Activity Management: activity adjusted per tolerance  Taken 5/25/2022 2000 by Noa Hoskins, RN  Activity Management: activity adjusted per tolerance  Goal: Optimal Comfort and Wellbeing  Outcome: Ongoing, Progressing  Goal: Readiness for Transition of Care  Outcome: Ongoing, Progressing  Intervention: Mutually Develop Transition Plan  Recent Flowsheet Documentation  Taken 5/25/2022 1900 by Noa Hoskins, RN  Equipment Currently Used at Home: none   Goal Outcome Evaluation:

## 2022-05-26 NOTE — PROVIDER NOTIFICATION
Contacted provider regarding increasing level of care of patient to intermediate care status.  Dr Patel agreed to change and patient accomodation code was changed.

## 2022-05-26 NOTE — PROGRESS NOTES
"  GASTROENTEROLOGY PROGRESS NOTE     SUBJECTIVE   The patient is sleepy this AM-- patient's mom (Leticia) reports the patient has not slept for a few days.   No sign of gi bleeding. Was restarted on Lactulose 5/25/22 and is passing brown stool.   No abdominal pain, fever, nausea, or vomiting.      OBJECTIVE     Vitals Blood pressure 128/60, pulse 82, temperature (!) 96.7  F (35.9  C), temperature source Axillary, resp. rate 18, height 1.676 m (5' 6\"), weight 59 kg (130 lb 1.6 oz), SpO2 98 %.          Physical Exam   General: awake, alert, responds appropriately    Cardiovascular: S1S2, no edema    Chest: lungs are clear     Abdomen: +bs, soft, not tender    Neurologic: grossly intact        LABORATORY    ELECTROLYTE PANEL   Recent Labs   Lab 05/26/22  1211 05/26/22  0928 05/26/22  0741 05/25/22  1754 05/25/22  1405 05/25/22  1322 05/25/22  0832 05/25/22  0819 05/25/22  0731 05/25/22  0439 05/24/22  0724 05/24/22  0619   NA  --  127*  --   --   --  124*  --  124*  --  123*  --  130*   POTASSIUM  --  5.0  --   --  5.2* >10.0*  --  5.5*  --  6.3*  --  4.9   CHLORIDE  --  94*  --   --   --   --   --   --   --  92*  --  96*   CO2  --  24  --   --   --   --   --   --   --  18*  --  25   * 155* 104*   < >  --   --    < >  --    < > 374*   < > 42*   CR  --  0.69*  --   --   --   --   --   --   --  0.77  --  0.72   BUN  --  34*  --   --   --   --   --   --   --  39*  --  42*    < > = values in this interval not displayed.      HEMATOLOGY PANEL   Recent Labs   Lab 05/26/22  0928 05/25/22  0439 05/24/22  0619 05/23/22  2305   HGB 7.2* 7.5* 7.7*  --    MCV 98 100 100  --    WBC 11.5* 11.0 17.1*  --    PLT 44* 73* 39*  --    INR 2.54* 2.32*  --  2.13*      LIVER AND PANCREAS PANEL   Recent Labs   Lab 05/26/22  0928 05/25/22  0439 05/24/22  0619   * 119* 125*   ALT 55* 60* 56*   ALKPHOS 128* 144* 160*   BILITOTAL 21.9* 18.6* 13.2*     Blood culture 5/25 and 5/26/22 +Staph aureus  Urine culture with E coli and Staph " aureus    IMAGING STUDIES    EXAM: US ABD HEPATIC and PORTAL VASCULATURE [facility] 5/24/22     INDICATION: abnormal liver tests, cirrhosis.     COMPARISON: Ultrasound 4/7/2022      TECHNIQUE: Gray-scale and color imaging of the right upper quadrant was performed.     FINDINGS:   PANCREAS: Normal where visualized, without focal mass identified. No pancreatic ductal dilatation is identified.     LIVER: The liver is coarsened in echogenicity with a peripheral nodular contour. Gallbladder sludge is noted biliary ductal dilatation.     OTHER: Small volume free fluid adjacent to the gallbladder.     Velocity (centimeters per second):  Main portal vein: 38  Left portal vein: 20  Right portal vein: 53  Left hepatic vein: 30  Middle hepatic vein: 19  Right hepatic vein: 29  Hepatic artery: 244  Splenic vein at pancreas: 21     Portal vein diameter: 1.2 cm.                                                                      IMPRESSION:   1. Cirrhotic liver morphology.  2. Patent hepatic vessels with appropriately directed flow.  3. Gallbladder sludge without sonographic evidence of cholecystitis    I have reviewed the current diagnostic and laboratory tests.            EGD 5/23/22  Findings:        Grade II varices were found in the lower third of the esophagus from        30-39 cm. They were 9 mm in largest diameter. There was old blood in the        esophagus. Three bands were successfully placed with complete        eradication, resulting in deflation of varices. The bands were placed at        38 cm, 35 cm and 33 cm along one prominent varix. There was no bleeding        during and at the end of the procedure.        The Z-line was regular and was found 39 cm from the incisors.        Hematin (altered blood/coffee-ground-like material) and old red blood        were found in the gastric body and fundus. No active bleeding was        observed..        The duodenal bulb, first portion of the duodenum and second portion of         the duodenum were normal. There was no blood in the duodenum.   Impression:            - Grade II esophageal varices. Completely eradicated.                          Banded.                          - Z-line regular, 39 cm from the incisors.                          - Hematin (altered blood/coffee-ground-like material)                          in the gastric body.                          - Normal duodenal bulb, first portion of the duodenum                          and second portion of the duodenum.                          - No specimens collected.   Recommendation:        - [facility]                          - Clear liquid diet today.                          - Continue present medications-Ceftriaxone, Octreotide                          and IV Protonix                          - Consider Lactulose 30 cc PO qday to aim for 3-4                          BM's/day                          - Repeat EGD with General in 3-4 weeks-ordered thru                          [facility]                                                                                       Gary Mary MD   ____________________   GARY MARY,       IMPRESSION   Upper gi bleed with acute blood loss anemia-- Grade II esophageal varices s/p banding 5/24/22 (blood/coffee-ground material in gastric body). Hgb is stable with no further signs of gi bleeding  Etoh hepatitis w/ cirrhosis complicated by portal htn and encephalopathy-- Total bilirubin is up slightly today. Abd Us with duplex shows cirrhosis, but patent hepatic vessels and no significant ascites. Diuretics currently on hold. MELD-Na 32 ( 32 5/25/22, 28 5/24/22)  Reports last drank alcohol 4/6/22.      Staph aureus bacteremia and urine cx + staph aureus and e coli-- on Ceftriaxone and Vanco. Appreciate input from infectious disease.   Autism/Aspergers  Type 2 diabetes         PLAN   Stop Octreotide.  Continue IV ppi.  Continue Ceftriaxone X 7days  (started 5/23/22).  Ok to advance to soft Less than 2g Na diet.  Tentatively plan for repeat egd in 3-4 weeks.   Monitor Hgb and transfuse RBCs/platelets as needed.         Monitor renal function closely-- diuretics are currently on hold.  Electrolyte correction per primary team.   Currently on Ceftriaxone and Vanco-- appreciate input from infectious disease.    Continue Lactulose-- goal 3-4 BMs per day.     The patient will benefit from physical therapy and consultation with dietician re: low sodium/high protein diet (once diet is advanced).   Encourage ongoing sobriety.   Daily MELD labs.  The patient has f/up at [facility] hepatology arranged for August.         Karlee Chow PA-C  Thank you for the opportunity to participate in the care of this patient.   Please feel free to call me with any questions or concerns.  Phone number [phone].

## 2022-05-27 ENCOUNTER — HOME INFUSION (PRE-WILLOW HOME INFUSION) (OUTPATIENT)
Dept: PHARMACY | Facility: CLINIC | Age: 27
End: 2022-05-27
Payer: COMMERCIAL

## 2022-05-27 LAB
ABO/RH(D): NORMAL
ALBUMIN SERPL-MCNC: 2.5 G/DL (ref 3.5–5)
ALP SERPL-CCNC: 100 U/L (ref 45–120)
ALT SERPL W P-5'-P-CCNC: 42 U/L (ref 0–45)
ANION GAP SERPL CALCULATED.3IONS-SCNC: 5 MMOL/L (ref 5–18)
ANTIBODY SCREEN: NEGATIVE
AST SERPL W P-5'-P-CCNC: 98 U/L (ref 0–40)
BACTERIA BLD CULT: ABNORMAL
BACTERIA BLD CULT: ABNORMAL
BACTERIA UR CULT: ABNORMAL
BACTERIA UR CULT: ABNORMAL
BILIRUB DIRECT SERPL-MCNC: 4.7 MG/DL
BILIRUB SERPL-MCNC: 17.4 MG/DL (ref 0–1)
BLD PROD TYP BPU: NORMAL
BLD PROD TYP BPU: NORMAL
BLOOD COMPONENT TYPE: NORMAL
BLOOD COMPONENT TYPE: NORMAL
BUN SERPL-MCNC: 28 MG/DL (ref 8–22)
CALCIUM SERPL-MCNC: 8.6 MG/DL (ref 8.5–10.5)
CHLORIDE BLD-SCNC: 100 MMOL/L (ref 98–107)
CO2 SERPL-SCNC: 30 MMOL/L (ref 22–31)
CODING SYSTEM: NORMAL
CODING SYSTEM: NORMAL
CREAT SERPL-MCNC: 0.66 MG/DL (ref 0.7–1.3)
CROSSMATCH: NORMAL
ERYTHROCYTE [DISTWIDTH] IN BLOOD BY AUTOMATED COUNT: 20.7 % (ref 10–15)
ERYTHROCYTE [DISTWIDTH] IN BLOOD BY AUTOMATED COUNT: 21.1 % (ref 10–15)
ERYTHROCYTE [DISTWIDTH] IN BLOOD BY AUTOMATED COUNT: 21.4 % (ref 10–15)
GFR SERPL CREATININE-BSD FRML MDRD: >90 ML/MIN/1.73M2
GLUCOSE BLD-MCNC: 36 MG/DL (ref 70–125)
GLUCOSE BLDC GLUCOMTR-MCNC: 116 MG/DL (ref 70–99)
GLUCOSE BLDC GLUCOMTR-MCNC: 127 MG/DL (ref 70–99)
GLUCOSE BLDC GLUCOMTR-MCNC: 162 MG/DL (ref 70–99)
GLUCOSE BLDC GLUCOMTR-MCNC: 169 MG/DL (ref 70–99)
GLUCOSE BLDC GLUCOMTR-MCNC: 182 MG/DL (ref 70–99)
GLUCOSE BLDC GLUCOMTR-MCNC: 30 MG/DL (ref 70–99)
GLUCOSE BLDC GLUCOMTR-MCNC: 31 MG/DL (ref 70–99)
GLUCOSE BLDC GLUCOMTR-MCNC: 315 MG/DL (ref 70–99)
GLUCOSE BLDC GLUCOMTR-MCNC: 34 MG/DL (ref 70–99)
GLUCOSE BLDC GLUCOMTR-MCNC: 84 MG/DL (ref 70–99)
HCT VFR BLD AUTO: 19.7 % (ref 40–53)
HCT VFR BLD AUTO: 22.3 % (ref 40–53)
HCT VFR BLD AUTO: 22.8 % (ref 40–53)
HGB BLD-MCNC: 6.9 G/DL (ref 13.3–17.7)
HGB BLD-MCNC: 7.7 G/DL (ref 13.3–17.7)
HGB BLD-MCNC: 7.8 G/DL (ref 13.3–17.7)
INR PPP: 2.14 (ref 0.85–1.15)
ISSUE DATE AND TIME: NORMAL
ISSUE DATE AND TIME: NORMAL
KAPPA LC FREE SER-MCNC: 2.42 MG/DL (ref 0.33–1.94)
KAPPA LC FREE/LAMBDA FREE SER NEPH: 1.33 {RATIO} (ref 0.26–1.65)
LACTATE SERPL-SCNC: 1.7 MMOL/L (ref 0.7–2)
LAMBDA LC FREE SERPL-MCNC: 1.82 MG/DL (ref 0.57–2.63)
MCH RBC QN AUTO: 30.8 PG (ref 26.5–33)
MCH RBC QN AUTO: 31.1 PG (ref 26.5–33)
MCH RBC QN AUTO: 32.5 PG (ref 26.5–33)
MCHC RBC AUTO-ENTMCNC: 34.2 G/DL (ref 31.5–36.5)
MCHC RBC AUTO-ENTMCNC: 34.5 G/DL (ref 31.5–36.5)
MCHC RBC AUTO-ENTMCNC: 35 G/DL (ref 31.5–36.5)
MCV RBC AUTO: 89 FL (ref 78–100)
MCV RBC AUTO: 91 FL (ref 78–100)
MCV RBC AUTO: 93 FL (ref 78–100)
PATH REPORT.COMMENTS IMP SPEC: NORMAL
PATH REPORT.COMMENTS IMP SPEC: NORMAL
PATH REPORT.FINAL DX SPEC: NORMAL
PATH REPORT.RELEVANT HX SPEC: NORMAL
PLATELET # BLD AUTO: 60 10E3/UL (ref 150–450)
PLATELET # BLD AUTO: 66 10E3/UL (ref 150–450)
PLATELET # BLD AUTO: 73 10E3/UL (ref 150–450)
POTASSIUM BLD-SCNC: 4.2 MMOL/L (ref 3.5–5)
PROT SERPL-MCNC: 7 G/DL (ref 6–8)
RBC # BLD AUTO: 2.12 10E6/UL (ref 4.4–5.9)
RBC # BLD AUTO: 2.5 10E6/UL (ref 4.4–5.9)
RBC # BLD AUTO: 2.51 10E6/UL (ref 4.4–5.9)
SODIUM SERPL-SCNC: 135 MMOL/L (ref 136–145)
SPECIMEN EXPIRATION DATE: NORMAL
UNIT ABO/RH: NORMAL
UNIT ABO/RH: NORMAL
UNIT NUMBER: NORMAL
UNIT NUMBER: NORMAL
UNIT STATUS: NORMAL
UNIT STATUS: NORMAL
UNIT TYPE ISBT: 600
UNIT TYPE ISBT: 600
WBC # BLD AUTO: 11.5 10E3/UL (ref 4–11)
WBC # BLD AUTO: 12.1 10E3/UL (ref 4–11)
WBC # BLD AUTO: 12.5 10E3/UL (ref 4–11)

## 2022-05-27 PROCEDURE — 99233 SBSQ HOSP IP/OBS HIGH 50: CPT | Performed by: INTERNAL MEDICINE

## 2022-05-27 PROCEDURE — 86850 RBC ANTIBODY SCREEN: CPT | Performed by: HOSPITALIST

## 2022-05-27 PROCEDURE — 85027 COMPLETE CBC AUTOMATED: CPT | Performed by: HOSPITALIST

## 2022-05-27 PROCEDURE — 85060 BLOOD SMEAR INTERPRETATION: CPT | Performed by: PATHOLOGY

## 2022-05-27 PROCEDURE — 258N000003 HC RX IP 258 OP 636: Performed by: HOSPITALIST

## 2022-05-27 PROCEDURE — 250N000011 HC RX IP 250 OP 636: Performed by: INTERNAL MEDICINE

## 2022-05-27 PROCEDURE — C9113 INJ PANTOPRAZOLE SODIUM, VIA: HCPCS | Performed by: INTERNAL MEDICINE

## 2022-05-27 PROCEDURE — 86923 COMPATIBILITY TEST ELECTRIC: CPT | Performed by: INTERNAL MEDICINE

## 2022-05-27 PROCEDURE — 85610 PROTHROMBIN TIME: CPT | Performed by: PHYSICIAN ASSISTANT

## 2022-05-27 PROCEDURE — P9016 RBC LEUKOCYTES REDUCED: HCPCS | Performed by: HOSPITALIST

## 2022-05-27 PROCEDURE — P9035 PLATELET PHERES LEUKOREDUCED: HCPCS

## 2022-05-27 PROCEDURE — 258N000001 HC RX 258: Performed by: HOSPITALIST

## 2022-05-27 PROCEDURE — 36415 COLL VENOUS BLD VENIPUNCTURE: CPT | Performed by: HOSPITALIST

## 2022-05-27 PROCEDURE — 83605 ASSAY OF LACTIC ACID: CPT | Performed by: HOSPITALIST

## 2022-05-27 PROCEDURE — 250N000013 HC RX MED GY IP 250 OP 250 PS 637: Performed by: PHYSICIAN ASSISTANT

## 2022-05-27 PROCEDURE — 258N000001 HC RX 258: Performed by: INTERNAL MEDICINE

## 2022-05-27 PROCEDURE — 86923 COMPATIBILITY TEST ELECTRIC: CPT | Performed by: HOSPITALIST

## 2022-05-27 PROCEDURE — 250N000013 HC RX MED GY IP 250 OP 250 PS 637: Performed by: INTERNAL MEDICINE

## 2022-05-27 PROCEDURE — 250N000013 HC RX MED GY IP 250 OP 250 PS 637: Performed by: STUDENT IN AN ORGANIZED HEALTH CARE EDUCATION/TRAINING PROGRAM

## 2022-05-27 PROCEDURE — 82248 BILIRUBIN DIRECT: CPT | Performed by: PHYSICIAN ASSISTANT

## 2022-05-27 PROCEDURE — 82310 ASSAY OF CALCIUM: CPT | Performed by: PHYSICIAN ASSISTANT

## 2022-05-27 PROCEDURE — 87040 BLOOD CULTURE FOR BACTERIA: CPT | Performed by: INTERNAL MEDICINE

## 2022-05-27 PROCEDURE — 120N000004 HC R&B MS OVERFLOW

## 2022-05-27 PROCEDURE — 99233 SBSQ HOSP IP/OBS HIGH 50: CPT | Performed by: HOSPITALIST

## 2022-05-27 RX ADMIN — DEXTROSE MONOHYDRATE 50 ML: 25 INJECTION, SOLUTION INTRAVENOUS at 06:47

## 2022-05-27 RX ADMIN — DEXTROSE MONOHYDRATE 50 ML: 25 INJECTION, SOLUTION INTRAVENOUS at 02:06

## 2022-05-27 RX ADMIN — LACTULOSE 20 G: 10 SOLUTION ORAL at 09:01

## 2022-05-27 RX ADMIN — LACTULOSE 20 G: 10 SOLUTION ORAL at 20:23

## 2022-05-27 RX ADMIN — PANTOPRAZOLE SODIUM 40 MG: 40 INJECTION, POWDER, FOR SOLUTION INTRAVENOUS at 20:21

## 2022-05-27 RX ADMIN — CEFAZOLIN SODIUM 2 G: 2 INJECTION, SOLUTION INTRAVENOUS at 08:58

## 2022-05-27 RX ADMIN — SODIUM CHLORIDE: 9 INJECTION, SOLUTION INTRAVENOUS at 07:07

## 2022-05-27 RX ADMIN — FOLIC ACID 1 MG: 1 TABLET ORAL at 09:01

## 2022-05-27 RX ADMIN — INSULIN ASPART 1 UNITS: 100 INJECTION, SOLUTION INTRAVENOUS; SUBCUTANEOUS at 11:53

## 2022-05-27 RX ADMIN — CEFAZOLIN SODIUM 2 G: 2 INJECTION, SOLUTION INTRAVENOUS at 15:51

## 2022-05-27 RX ADMIN — INSULIN ASPART 1 UNITS: 100 INJECTION, SOLUTION INTRAVENOUS; SUBCUTANEOUS at 16:26

## 2022-05-27 RX ADMIN — THERA TABS 1 TABLET: TAB at 09:01

## 2022-05-27 RX ADMIN — DEXTROSE AND SODIUM CHLORIDE: 5; 900 INJECTION, SOLUTION INTRAVENOUS at 08:14

## 2022-05-27 RX ADMIN — DEXTROSE MONOHYDRATE 50 ML: 25 INJECTION, SOLUTION INTRAVENOUS at 03:22

## 2022-05-27 RX ADMIN — Medication 50 MG: at 09:01

## 2022-05-27 RX ADMIN — PANTOPRAZOLE SODIUM 40 MG: 40 INJECTION, POWDER, FOR SOLUTION INTRAVENOUS at 09:01

## 2022-05-27 RX ADMIN — FLUOXETINE HYDROCHLORIDE 40 MG: 20 CAPSULE ORAL at 20:21

## 2022-05-27 ASSESSMENT — ACTIVITIES OF DAILY LIVING (ADL)
ADLS_ACUITY_SCORE: 24
ADLS_ACUITY_SCORE: 25
ADLS_ACUITY_SCORE: 25
ADLS_ACUITY_SCORE: 24
ADLS_ACUITY_SCORE: 25
ADLS_ACUITY_SCORE: 23
ADLS_ACUITY_SCORE: 23
ADLS_ACUITY_SCORE: 24
ADLS_ACUITY_SCORE: 23
ADLS_ACUITY_SCORE: 25
ADLS_ACUITY_SCORE: 23
ADLS_ACUITY_SCORE: 24

## 2022-05-27 NOTE — CONSULTS
[facility] Inpatient Consult Note    Patient: Dillon Salter  MRN: [id number]  Date of Service: 5/26/2022      Reason for Visit    I was consulted by Dr. Patel regarding known liver disease, unusually elevated total prot:albumin ratio, acute on chronic anemia, thrombocytopenia    Assessment/Plan    #.  Acute blood loss anemia on chronic anemia, secondary to variceal bleeding  #.  Cirrhosis of liver with portal hypertension, secondary to alcohol use  #.  Elevated INR secondary to liver disease  #.  Neutrophilia, likely from infection  #.  Mild encephalopathy  #.  Chronic hyponatremia likely from chronic liver disease.  #.  Autism     I reviewed his labs.  He had leukocytosis which is now improved to near normal with absolute neutrophilia.  He has severe thrombocytopenia.  He has elevated INR secondary to underlying liver disease.  Fibrinogen was normal 2 days ago.  His INR today was 2.5.  He was given vitamin K.  No evidence of B12 or folate deficiency and in fact he is on supplement.  LDH was elevated at 823.  His bilirubin is markedly elevated and mainly direct bilirubin.  Haptoglobin is undetectable. He has evidence of hemolysis by lab parameters in the setting of alcoholic liver disease.   He has reticulocytosis suggesting reactive marrow and likely active bleeding.  Risk of bleeding is fairly high given his abnormal coagulopathy, thrombocytopenia.  I will send out morphology review.  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    Steroids is generally not useful in this situation.    Recommended continue with transfusion support.   Advised to carefully watch for evidence of bleeding or blood loss.   Agree with correcting INR as possible.   Agree with platelet transfusion with evidence of bleeding to target platelet above 50 K.   Complete abstinence of alcohol.    Will follow.  ______________________________________________________________________________    History    Dillon Salter is a very pleasant 26 year old male with history of alcoholic cirrhosis, portal hypertension who is currently in the hospital with acute on chronic anemia secondary to upper GI bleed.  He is awake and alert at the time of my encounter and provided history.  He reported that his last drink of alcohol was on 4/6/2022.  He admitted to the hospital 3 days ago with hemoglobin of 5.3 from 8.3 about a month prior.  He had coffee-ground emesis a couple times prior to admission.  No report of bright red blood loss.  He received 2 units of packed RBC transfusion.  He had EGD yesterday and found bleeding esophageal varices which were banded.  His hemoglobin was stabilized to low 7 g/dL this morning however repeat hemoglobin this afternoon was 5.8.  He is receiving unit of blood and going to have a unit of platelets for platelet count of 34.  Since admission, he does not have any emesis.  No bowel movement yet.  He has dried blood on his lips and some gum bleeding.  No epistaxis.    He lives with his parents.  He is mother is the primary caregiver for him.  He used to work in restaurants in various positions.  Currently unemployed.  No family history of blood disease.    Review of systems.  Apart from describing in history, the remainder of comprehensive ROS was negative.      Past History  Past Medical History:   Diagnosis Date     Diabetes (H)      Past Surgical History:   Procedure Laterality Date     ESOPHAGOSCOPY, GASTROSCOPY, DUODENOSCOPY (EGD), COMBINED N/A 5/24/2022    Procedure: ESOPHAGOGASTRODUODENOSCOPY (EGD) with esophageal banding;  Surgeon: Gary Hurst MD;  Location: [facility] Main OR     MIDLINE DOUBLE LUMEN PLACEMENT  5/26/2022          History reviewed. No pertinent family history.  Social History     Socioeconomic History     Marital status: Single     Spouse name: None     Number of children: None     Years of education: None     Highest education level: None   Tobacco Use     Smoking  "status: Former Smoker     Smokeless tobacco: Former User   Vaping Use     Vaping Use: Former   Substance and Sexual Activity     Alcohol use: Not Currently     Drug use: Not Currently     Types: Marijuana   Social History Narrative    26-year-old history of autism/Asperger's on the spectrum graduated high school at [employer] worked at [employer] and then another  place until the Covid epidemic in 2020 lives with parents (Leticia and Wes) socially isolated drinks alcohol beer daily        End-stage cirrhosis noted on admission to  April 2022       Allergies    Allergies   Allergen Reactions     Latex Swelling          Physical Exam    /66   Pulse 86   Temp 98  F (36.7  C) (Oral)   Resp 16   Ht 1.676 m (5' 6\")   Wt 59 kg (130 lb 1.6 oz)   SpO2 100%   BMI 21.00 kg/m        General: alert, awake, not in acute distress  HEENT: Head: Normal, normocephalic, atraumatic.  Eye: Icterus sclera.  Nose: Normal external nose, mucus membranes and septum.  Pharynx: Normal buccal mucosa. Normal pharynx.  Neck / Thyroid: Supple, no masses, nodes, nodules or enlargement.  Lymphatics: No abnormally enlarged lymph nodes.  Chest: Normal chest wall and respirations. Clear to auscultation.  Heart: S1 S2 RRR, no murmur.   Abdomen: abdomen is soft without significant tenderness, masses, organomegaly or guarding  Extremities: normal strength, tone, and muscle mass  Skin: normal. no rash or abnormalities  CNS: non focal.  Slow in response.      Lab Results  Recent Results (from the past 24 hour(s))   Glucose by meter    Collection Time: 05/26/22  7:41 AM   Result Value Ref Range    GLUCOSE BY METER POCT 104 (H) 70 - 99 mg/dL   Echocardiogram Complete    Collection Time: 05/26/22  8:45 AM   Result Value Ref Range    LVEF  60-65%    Hepatic panel    Collection Time: 05/26/22  9:28 AM   Result Value Ref Range    Bilirubin Total 21.9 (HH) 0.0 - 1.0 mg/dL    Bilirubin Direct 5.9 (H) <=0.5 mg/dL    Protein Total 8.6 (H) 6.0 - 8.0 g/dL "    Albumin 2.8 (L) 3.5 - 5.0 g/dL    Alkaline Phosphatase 128 (H) 45 - 120 U/L     (H) 0 - 40 U/L    ALT 55 (H) 0 - 45 U/L   Basic metabolic panel    Collection Time: 05/26/22  9:28 AM   Result Value Ref Range    Sodium 127 (L) 136 - 145 mmol/L    Potassium 5.0 3.5 - 5.0 mmol/L    Chloride 94 (L) 98 - 107 mmol/L    Carbon Dioxide (CO2) 24 22 - 31 mmol/L    Anion Gap 9 5 - 18 mmol/L    Urea Nitrogen 34 (H) 8 - 22 mg/dL    Creatinine 0.69 (L) 0.70 - 1.30 mg/dL    Calcium 8.8 8.5 - 10.5 mg/dL    Glucose 155 (H) 70 - 125 mg/dL    GFR Estimate >90 >60 mL/min/1.73m2   CBC with platelets    Collection Time: 05/26/22  9:28 AM   Result Value Ref Range    WBC Count 11.5 (H) 4.0 - 11.0 10e3/uL    RBC Count 2.10 (L) 4.40 - 5.90 10e6/uL    Hemoglobin 7.2 (L) 13.3 - 17.7 g/dL    Hematocrit 20.5 (L) 40.0 - 53.0 %    MCV 98 78 - 100 fL    MCH 34.3 (H) 26.5 - 33.0 pg    MCHC 35.1 31.5 - 36.5 g/dL    RDW 20.1 (H) 10.0 - 15.0 %    Platelet Count 44 (LL) 150 - 450 10e3/uL   INR    Collection Time: 05/26/22  9:28 AM   Result Value Ref Range    INR 2.54 (H) 0.85 - 1.15   Glucose by meter    Collection Time: 05/26/22 12:11 PM   Result Value Ref Range    GLUCOSE BY METER POCT 140 (H) 70 - 99 mg/dL   Total Protein, Serum for ELP    Collection Time: 05/26/22  2:45 PM   Result Value Ref Range    Total Protein Serum for ELP 7.2 6.0 - 8.0 g/dL   CBC with platelets and differential    Collection Time: 05/26/22  2:45 PM   Result Value Ref Range    WBC Count 12.1 (H) 4.0 - 11.0 10e3/uL    RBC Count 1.65 (L) 4.40 - 5.90 10e6/uL    Hemoglobin 5.8 (LL) 13.3 - 17.7 g/dL    Hematocrit 16.2 (L) 40.0 - 53.0 %    MCV 98 78 - 100 fL    MCH 35.2 (H) 26.5 - 33.0 pg    MCHC 35.8 31.5 - 36.5 g/dL    RDW 20.8 (H) 10.0 - 15.0 %    Platelet Count 34 (LL) 150 - 450 10e3/uL    % Neutrophils 83 %    % Lymphocytes 11 %    % Monocytes 5 %    % Eosinophils 0 %    % Basophils 0 %    % Immature Granulocytes 1 %    NRBCs per 100 WBC 0 <1 /100    Absolute  Neutrophils 10.0 (H) 1.6 - 8.3 10e3/uL    Absolute Lymphocytes 1.3 0.8 - 5.3 10e3/uL    Absolute Monocytes 0.6 0.0 - 1.3 10e3/uL    Absolute Eosinophils 0.0 0.0 - 0.7 10e3/uL    Absolute Basophils 0.0 0.0 - 0.2 10e3/uL    Absolute Immature Granulocytes 0.2 <=0.4 10e3/uL    Absolute NRBCs 0.0 10e3/uL   Reticulocyte count    Collection Time: 05/26/22  2:45 PM   Result Value Ref Range    % Reticulocyte 18.4 (H) 0.8 - 2.7 %    Absolute Reticulocyte 0.304 (H) 0.010 - 0.110 10e6/uL   Prepare red blood cells (unit)    Collection Time: 05/26/22  3:30 PM   Result Value Ref Range    CROSSMATCH Compatible     UNIT ABO/RH A Neg     Unit Number [id number]     Unit Status Issued     Blood Component Type Red Blood Cells     Product Code N8147H59     CODING SYSTEM [id number]     UNIT TYPE ISBT 0600     ISSUE DATE AND TIME [id number]    Prepare red blood cells (unit)    Collection Time: 05/26/22  3:30 PM   Result Value Ref Range    CROSSMATCH Compatible     UNIT ABO/RH A Neg     Unit Number [id number]     Unit Status Issued     Blood Component Type Red Blood Cells     Product Code X5115T82     CODING SYSTEM [id number]     UNIT TYPE ISBT 0600     ISSUE DATE AND TIME [id number]    Prepare pheresed platelets (unit)    Collection Time: 05/26/22  3:30 PM   Result Value Ref Range    UNIT ABO/RH A Neg     Unit Number [id number]     Unit Status Issued     Blood Component Type Platelets     Product Code T7635V05     CODING SYSTEM [id number]     UNIT TYPE ISBT 0600     ISSUE DATE AND TIME [id number]    Prepare pheresed platelets (unit)    Collection Time: 05/26/22  3:30 PM   Result Value Ref Range    UNIT ABO/RH A Neg     Unit Number [id number]     Unit Status Ready     Blood Component Type Platelets     Product Code T7390E15     CODING SYSTEM [id number]     UNIT TYPE ISBT 0600    Haptoglobin    Collection Time: 05/26/22  3:49 PM   Result Value Ref Range    Haptoglobin <8 (L) 33 - 171 mg/dL   Lactate Dehydrogenase    Collection  Time: 22  4:30 PM   Result Value Ref Range    Lactate Dehydrogenase 823 (H) 125 - 220 U/L   Glucose by meter    Collection Time: 22  4:56 PM   Result Value Ref Range    GLUCOSE BY METER POCT 239 (H) 70 - 99 mg/dL   Glucose by meter    Collection Time: 22  9:18 PM   Result Value Ref Range    GLUCOSE BY METER POCT 74 70 - 99 mg/dL        Imaging Results    Echocardiogram Complete    Result Date: 2022  [id number]^SYLVIE^ARIAN^[place] [address]  Name: EYAL ROONEY MRN: [id number] : 1995 Study Date: 2022 08:16 AM Age: 26 yrs Gender: Male Patient Location: [facility] Reason For Study: Endocarditis Ordering Physician: ARIAN MERCER Performed By: CASTRO  BSA: 1.7 m2 Height: 66 in Weight: 130 lb ______________________________________________________________________________ ______________________________________________________________________________ Interpretation Summary  Left ventricular size, wall motion and function are normal. The ejection fraction is 60-65%. Normal right ventricle size and systolic function. No vegetations seen. No hemodynamically significant valvular abnormalities on 2D or color flow imaging. ______________________________________________________________________________ Left Ventricle Left ventricular size, wall motion and function are normal. The ejection fraction is 60-65%. There is normal left ventricular wall thickness. Left ventricular diastolic function is normal. No regional wall motion abnormalities noted.  Right Ventricle Normal right ventricle size and systolic function.  Atria Normal left atrial size. Right atrial size is normal. There is no color Doppler evidence of an atrial shunt.  Mitral Valve Mitral valve leaflets appear normal. There is no evidence of mitral stenosis or clinically significant mitral regurgitation.  Tricuspid Valve Tricuspid valve leaflets appear normal. There is no  evidence of tricuspid stenosis or clinically significant tricuspid regurgitation. Right ventricle systolic pressure estimate normal. There is trace tricuspid regurgitation.  Aortic Valve Aortic valve leaflets appear normal. There is no evidence of aortic stenosis or clinically significant aortic regurgitation.  Pulmonic Valve The pulmonic valve is not well seen, but is grossly normal. This degree of valvular regurgitation is within normal limits.  Vessels The aorta root is normal. Normal size ascending aorta. IVC diameter <2.1 cm collapsing >50% with sniff suggests a normal RA pressure of 3 mmHg.  Pericardium There is no pericardial effusion. ______________________________________________________________________________ MMode/2D Measurements & Calculations IVSd: 0.88 cm  LVIDd: 4.7 cm LVIDs: 3.4 cm LVPWd: 1.3 cm FS: 26.5 % LV mass(C)d: 181.0 grams LV mass(C)dI: 108.7 grams/m2 Ao root diam: 2.6 cm LA dimension: 3.7 cm LA/Ao: 1.4 LVOT diam: 1.8 cm LVOT area: 2.6 cm2 LA Volume Indexed (AL/bp): 27.6 ml/m2 RWT: 0.55  Time Measurements MM HR: 81.0 BPM  Doppler Measurements & Calculations MV E max luca: 127.0 cm/sec MV A max luca: 113.5 cm/sec MV E/A: 1.1 MV max P.2 mmHg MV mean PG: 3.8 mmHg MV V2 VTI: 34.3 cm MVA(VTI): 1.6 cm2 MV dec time: 0.17 sec Ao V2 max: 171.0 cm/sec Ao max P.0 mmHg Ao V2 mean: 120.3 cm/sec Ao mean P.8 mmHg Ao V2 VTI: 37.7 cm THOR(I,D): 1.4 cm2 THOR(V,D): 1.4 cm2 LV V1 max PG: 3.2 mmHg LV V1 max: 89.7 cm/sec LV V1 VTI: 20.9 cm SV(LVOT): 54.5 ml SI(LVOT): 32.7 ml/m2 PA acc time: 0.08 sec TR max luca: 224.0 cm/sec TR max P.1 mmHg AV Luca Ratio (DI): 0.52 THOR Index (cm2/m2): 0.87 E/E' av.7 Lateral E/e': 8.0 Medial E/e': 9.3  ______________________________________________________________________________ Report approved by: Sofya Saucedo 2022 09:55 AM       XR Chest Port 1 View    Result Date: 2022  EXAM: XR CHEST PORT 1 VIEW LOCATION: [facility]  DATE/TIME: 5/23/2022 11:10 PM INDICATION: Sepsis. COMPARISON: CT chest 04/08/2022     IMPRESSION: Negative chest.    XR Knee AP Standing Bilateral    Result Date: 5/25/2022  EXAM: XR KNEE AP STANDING BILATERAL LOCATION: [facility] DATE/TIME: 5/25/2022 6:03 PM INDICATION: recent trauma, bacteremia COMPARISON: None.     IMPRESSION: RIGHT KNEE: Normal joint spaces and alignment. No fracture. LEFT KNEE: Normal joint spaces and alignment. No fracture.    US Abd Hepatic & Portal Vasculature Cmpl    Result Date: 5/24/2022  [facility] 5/24/2022 10:50 AM EXAM: US ABD HEPATIC and PORTAL VASCULATURE CMPL INDICATION: abnormal liver tests, cirrhosis. COMPARISON: Ultrasound 4/7/2022 TECHNIQUE: Gray-scale and color imaging of the right upper quadrant was performed. FINDINGS: PANCREAS: Normal where visualized, without focal mass identified. No pancreatic ductal dilatation is identified. LIVER: The liver is coarsened in echogenicity with a peripheral nodular contour. Gallbladder sludge is noted biliary ductal dilatation. OTHER: Small volume free fluid adjacent to the gallbladder. Velocity (centimeters per second): Main portal vein: 38 Left portal vein: 20 Right portal vein: 53 Left hepatic vein: 30 Middle hepatic vein: 19 Right hepatic vein: 29 Hepatic artery: 244 Splenic vein at pancreas: 21 Portal vein diameter: 1.2 cm.     IMPRESSION: 1. Cirrhotic liver morphology. 2. Patent hepatic vessels with appropriately directed flow. 3. Gallbladder sludge without sonographic evidence of cholecystitis.       Signed by: Tez Riojas MD

## 2022-05-27 NOTE — PROGRESS NOTES
Pt blood sugar is 34. Administering 50ml  Dextrose 50% IV.    Informed Dr Negron, no additional orders.    Blood sugar recheck 15 minutes after Dextrose administration is 127

## 2022-05-27 NOTE — PROGRESS NOTES
[facility]    Referral received  from Amparo Fong CM,  for IV antibiotics.    Benefits verified.  Pt has coverage for IV antibiotics under his  PMAP plan.    Writer will speak with pt to review benefits, home infusion and to offer choice of agency/home infusion provider.    Thank you for the referral.    Kaleigh Ellington RN, BSN  [facility] Liaison  [phone] (Mon through Fri, 8:00 am-5:00 pm)  [phone] (Office)

## 2022-05-27 NOTE — PROGRESS NOTES
[facility] Medicine PROGRESS NOTE      Identification/Summary:   Dillon Salter is a 26-year-old male with history of Autism/Asperger, DM2, anxiety, alcohol abuse x 8.5 years complicated by cirrhosis with portal hypertension and recent hospitalization 04/06-04/12  for aspiration pneumonia, in the setting of alcohol intoxication and alcoholic hepatitis. Admitted to  [facility] on May 23 due to concerns of severe anemia secondary to hematemesis, encephalopathy and general body weakness.    His evaluation here has been quite complicated with acute on chronic anemia due to blood loss and hemolytic anemia, staff aureus bacteremia, staph and E. coli in urine.  Being treated with frequent blood transfusions, platelet transfusions.  Followed by gastroenterology, infectious disease, hematology.    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  Presented with hemoglobin of 5.3, in the setting of upper GI bleeding/hematemesis  -Got 2 units of packed red blood cells in ED. despite this hemoglobin dropped again  -Currently no evidence of GI blood loss, acute drop due to hemolysis  -Transfused another 2 units on the evening of 5/26 as well as 2 units of platelets  -Appreciate hematology consult  -Hemoglobin below 7 again this morning, transfusing 1 more unit of packed red cells  -Platelets stable this morning     Alcoholic liver disease, severe  History of alcohol abuse with alcoholic hepatitis, sober for greater than 1 month  Portal hypertension on imaging  Coagulopathy  Thrombocytopenia  Encephalopathy  Elevated ammonia  Severe malnutrition  -EGD completed May 24 with large esophageal varices, banded  -Advance to soft diet  -Continue IV PPI twice daily  -Continue lactulose  -Got platelets transfusion 05/24; transfuse for bleeding and plts< 50,000   -Resumed  p.o. and rectal lactulose, with goal of 3 BMs per day.    -Holding home furosemide, spironolactone  -Abdominal ultrasound with duplex, cirrhotic liver morphology, no PVT, no ascites, no cholecystitis  -Trend CBC LFTs and INR daily     MSSA bacteremia  UTI with staph aureus and E. coli  -appreciate infectious disease consult, on cefazolin  -Now has midline    Moderate hyponatremia, acute on chronic, baseline sodium likely around 130  -Likely in the setting of chronic liver disease   -Clinically not hypervolemic  -Improved with normal saline infusion, likely hypovolemic hyponatremia  -Not due to excessive free water intake     Hyperkalemia  -Potassium on admission 5.4.   Subsequently up to 6.5   -Hyperkalemia protocol, close monitoring  -Discontinued spironolactone  -Keep on telemetry  -Continue to monitor     Hyperglycemia  Diabetes mellitus.   -At home on Lantus 50 units at night, and aspart 5 units 3 times daily  -Quite hypoglycemic this morning, started fluids with dextrose  -Decrease Lantus to 20 units in the morning  -Continue carb counts and sliding scale with meals  -Discontinue IV fluids with dextrose if he becomes hyperglycemic     History of hypertension and dyslipidemia  -We will monitor here and resume blood pressure meds as indicated  -Possibly start nadolol     Autism/Asperger's  -Very pleasant and interactive, high functioning      Interval History/Subjective:  Denies any pain, nausea, vomiting.  No melena or hematochezia.  No chest pain or shortness of breath.  Has Negron catheter in place.    Physical Exam/Objective:  Gen: Thin, no acute distress but appears ill  ENT: Notable scleral icterus  Pulm: lungs are clear without significant bibasilar crackles, no wheezing, breathing comfortably at rest  CV: regular rate and rhythm, trace lower extremity edema  GI: abdomen is soft, no tenderness to palpation  Derm: Jaundiced, scattered bruises  Psych: Appropriate affect    Medications:     ceFAZolin  2 g  Intravenous Q8H     FLUoxetine  40 mg Oral At Bedtime     folic acid  1 mg Oral Daily     insulin aspart   Subcutaneous Daily with breakfast     insulin aspart   Subcutaneous Daily with lunch     insulin aspart   Subcutaneous Daily with supper     insulin aspart  1-7 Units Subcutaneous TID AC     insulin aspart  1-5 Units Subcutaneous At Bedtime     [START ON 5/28/2022] insulin glargine  20 Units Subcutaneous QAM     lactulose  20 g Oral BID     multivitamin, therapeutic  1 tablet Oral Daily     pantoprazole (PROTONIX) IV  40 mg Intravenous BID     sodium chloride (PF)  10 mL Intracatheter Q8H     sodium chloride (PF)  3 mL Intracatheter Q8H     thiamine  50 mg Oral Daily       Serjio Patel DO   [facility] Hospitalist  [facility]

## 2022-05-27 NOTE — PROGRESS NOTES
Pt has 100% coverage for iv abx through their [employer] PMAP plan.             Please contact Intake with any questions, [phone] or In Basket pool, [facility] ([phone]).

## 2022-05-27 NOTE — PLAN OF CARE
Goal Outcome Evaluation:    Problem: Plan of Care - These are the overarching goals to be used throughout the patient stay.    Goal: Plan of Care Review/Shift Note      Problem: Diabetes Comorbidity  Goal: Blood Glucose Level Within Targeted Range  Outcome: Ongoing, Progressing    Patient denies pain.  Blood sugar at 0200 is 30.  Patient is given 50ml of Dextrose 50%.   Blood sugar 15 minutes after administration is 116.      Pt received 2 units of PRBC and @ units of platelets.  Labs due at 0645.    Tele: NSR with first degree AV block and PVCs.      Patient is incontinent of stool x 1.  Negron in place, draining jaundiced colored urine.       Pt mother remains at bedside

## 2022-05-27 NOTE — PROGRESS NOTES
Infectious Diseases Progress Note  [facility]    Date of visit: 05/27/2022       ASSESSMENT   26-year-old man with a history of alcoholic cirrhosis admitted with encephalopathy, jaundice, and upper GI bleeding.    1. Staff aureus bacteremia.  2 of 2 blood cultures on admission growing staph aureus (MSSA on verigene).  Repeat cultures negative to date.  No obvious source of infection, however immunocompromise due to liver disease.  Possible trigger of encephalopathy and bleeding.  Recent hospitalization, but no indication that the patient had a central line or other invasive procedure.  Mental status is improved since admission.  Denies history of IV drug use. TTE normal  2. Upper GI bleed.  Patient presenting with coffee-ground emesis.  EGD on 5/24 with esophageal varix  3. Liver disease.  Cirrhosis secondary to alcohol use.  Hepatitis B and C work-up negative on previous admission.  Currently sober since last admission.  Elevated INR and low platelets.  Does not have gross ascites.  Encephalopathy on admission, improving.  Takes lactulose at home.  4. Hyponatremia and hyperkalemia, improving  5. Anemia, thrombocytopenia, and coagulopathy. Getting PRBC and platelets. Signs of hemolysis on labs. Hematology consulted.  6. UTI.  Urine culture on admission with staph aureus and lower colony counts of E. coli.  No urinary symptoms.  Negron catheter currently in place. Both treated with cefazolin.    Active Problems:    Encephalopathy    Alcoholic cirrhosis of liver without ascites (H)    Anemia, unspecified type    Hematemesis, presence of nausea not specified       PLAN   -continue cefazolin for MSSA bacteremia. Will also cover E.coli in urine  -discontinue ceftriaxone. Cefazolin is adequate for sbp prophy following UGIB  -continue daily blood cultures to ensure clearance. Midline placed for access on 5/26. Repeat cultures negative since 5/25  -consider SHELL given MSSA bacteremia, however GI recommends waiting at  "least one week following banding of varices. Also high risk due to coagulopathy    ID will chart review over the weekend. Call with questions over the holiday weekend.      Travon Ortiz MD  [facility]  Direct messaging: [employer]  On-Call ID provider: [phone], option: [phone]      ===========================================      SUBJECTIVE / INTERVAL HISTORY:     Mother at bedside. hgb down overnight, needing PRBC and platelets. No signs of bleeding this morning. Also with hypoglycemia, on D5. Feeling fine this morning. Tolerating antibiotics.       Review of Systems     No fevers, no rashes     Antibiotics   Cefazolin 5/26-    Previous:  Ceftriaxone 5/23, 5/25-5/26  Vancomycin 5/24-5/26      Physical Exam     Temp:  [96.7  F (35.9  C)-99.4  F (37.4  C)] 97.6  F (36.4  C)  Pulse:  [] 102  Resp:  [16-20] 18  BP: (108-156)/(53-96) 138/85  SpO2:  [98 %-100 %] 98 %    /85   Pulse 102   Temp 97.6  F (36.4  C) (Oral)   Resp 18   Ht 1.676 m (5' 6\")   Wt 59 kg (130 lb 1.6 oz)   SpO2 98%   BMI 21.00 kg/m      GENERAL: Thin man, lying in bed in no acute distress.   HENT:  Head is normocephalic, atraumatic.  Oropharynx is clear, with dried blood  EYES:  Eyes are icteric. No conjunctival injection   LUNGS:  Clear to auscultation.  CARDIOVASCULAR:  Regular rate and rhythm with no murmurs, gallops or rubs.  ABDOMEN:  Normal bowel sounds, soft, nontender.   EXT: Extremities warm and without edema.  Bilateral bruising on the knees.  SKIN: Jaundice.    NEUROLOGIC:  Grossly nonfocal.      Cultures   5/23 urine culture: ,000 colonies E. coli, greater than 100,000 colonies staph aureus  5/23 blood cultures 2 of 2: MSSA  5/25 blood cultures x2: no growth to date  5/26 blood culture: no growth to date  5/27 blood culture: pending       Pertinent Labs:     Recent Labs   Lab 05/27/22  0638 05/26/22  1445 05/26/22  0928   WBC 12.1* 12.1* 11.5*   HGB 6.9* 5.8* 7.2*   PLT 73* 34* 44* "       Recent Labs   Lab 05/27/22  0638 05/26/22  0928 05/25/22  1322 05/25/22  0819 05/25/22  0439   * 127* 124*   < > 123*   CO2 30 24  --   --  18*   BUN 28* 34*  --   --  39*   ALBUMIN 2.5* 2.8*  --   --  2.9*   ALKPHOS 100 128*  --   --  144*   ALT 42 55*  --   --  60*   AST 98* 107*  --   --  119*    < > = values in this interval not displayed.       No results for input(s): CRP in the last 168 hours.    Invalid input(s): SEDRATE        Imaging:     Results for orders placed or performed during the hospital encounter of 05/23/22   XR Chest Port 1 View    Impression    IMPRESSION: Negative chest.   US Abd Hepatic & Portal Vasculature Cmpl    Impression    IMPRESSION:   1. Cirrhotic liver morphology.  2. Patent hepatic vessels with appropriately directed flow.  3. Gallbladder sludge without sonographic evidence of cholecystitis.   XR Knee AP Standing Bilateral    Impression    IMPRESSION:   RIGHT KNEE: Normal joint spaces and alignment. No fracture.    LEFT KNEE: Normal joint spaces and alignment. No fracture.   Echocardiogram Complete   Result Value Ref Range    LVEF  60-65%          Data reviewed today: I reviewed all medications, new labs and imaging results over the last 24 hours. I personally reviewed no images or EKG's today.  The patient's care was discussed with the Bedside Nurse, Patient and Patient's Family.

## 2022-05-27 NOTE — PLAN OF CARE
Patient with no signs of active bleeding. Had one small liquid bowel movement follow lactulose. Tan in color. 1 unit PRBCs given this AM. Hgb recheck 7.8.     Patient initiated on D5NS at 50 ml/hr due to low blood glucose. All AM insulin held, due to dropping to 30's x2 overnight. Patient allowed to eat clear liquids around 1000.  at 1200. Patient ate 1 jello at this time. Diet can be advanced as tolerated, but patient not eating very much yet.    Leni Arias RN on 5/27/2022 at 2:06 PM        Problem: Bleeding (Gastrointestinal Bleeding)  Goal: Hemostasis  Outcome: Ongoing, Progressing     Problem: Diabetes Comorbidity  Goal: Blood Glucose Level Within Targeted Range  Outcome: Ongoing, Progressing   Goal Outcome Evaluation:

## 2022-05-27 NOTE — PROGRESS NOTES
"  GASTROENTEROLOGY PROGRESS NOTE     SUBJECTIVE   Hypoglycemia with blood sugars into the 30s overnight. No fever. No signs of gi bleeding, but did receive 2units RBCs and platelets overnight (suspect hemolysis).   Passed brown stool overnight. Currently npo- no objection to advancing diet.      OBJECTIVE     Vitals Blood pressure 138/85, pulse 102, temperature 97.6  F (36.4  C), temperature source Oral, resp. rate 18, height 1.676 m (5' 6\"), weight 59 kg (130 lb 1.6 oz), SpO2 98 %.          Physical Exam   General: awake, alert, responds appropriately, +jaundice    Cardiovascular: S1S2, no edema    Chest: lungs are clear     Abdomen: +bs, soft, not tender    Neurologic: grossly intact        LABORATORY    ELECTROLYTE PANEL   Recent Labs   Lab 05/27/22  0808 05/27/22  0704 05/27/22  0644 05/27/22  0638 05/26/22  1211 05/26/22  0928 05/25/22  1754 05/25/22  1405 05/25/22  1322 05/25/22  0731 05/25/22  0439   NA  --   --   --  135*  --  127*  --   --  124*   < > 123*   POTASSIUM  --   --   --  4.2  --  5.0  --  5.2* >10.0*   < > 6.3*   CHLORIDE  --   --   --  100  --  94*  --   --   --   --  92*   CO2  --   --   --  30  --  24  --   --   --   --  18*   GLC 84 127* 31* 36*   < > 155*   < >  --   --    < > 374*   CR  --   --   --  0.66*  --  0.69*  --   --   --   --  0.77   BUN  --   --   --  28*  --  34*  --   --   --   --  39*    < > = values in this interval not displayed.      HEMATOLOGY PANEL   Recent Labs   Lab 05/27/22  0638 05/26/22  1445 05/26/22  0928 05/25/22  0439   HGB 6.9* 5.8* 7.2* 7.5*   MCV 93 98 98 100   WBC 12.1* 12.1* 11.5* 11.0   PLT 73* 34* 44* 73*   INR 2.14*  --  2.54* 2.32*      LIVER AND PANCREAS PANEL   Recent Labs   Lab 05/27/22  0638 05/26/22  0928 05/25/22  0439   AST 98* 107* 119*   ALT 42 55* 60*   ALKPHOS 100 128* 144*   BILITOTAL 17.4* 21.9* 18.6*       Blood culture 5/25 and 5/26/22 +Staph aureus  Urine culture with E coli and Staph aureus    IMAGING STUDIES    EXAM: US ABD HEPATIC and " PORTAL VASCULATURE [facility] 5/24/22     INDICATION: abnormal liver tests, cirrhosis.     COMPARISON: Ultrasound 4/7/2022      TECHNIQUE: Gray-scale and color imaging of the right upper quadrant was performed.     FINDINGS:   PANCREAS: Normal where visualized, without focal mass identified. No pancreatic ductal dilatation is identified.     LIVER: The liver is coarsened in echogenicity with a peripheral nodular contour. Gallbladder sludge is noted biliary ductal dilatation.     OTHER: Small volume free fluid adjacent to the gallbladder.     Velocity (centimeters per second):  Main portal vein: 38  Left portal vein: 20  Right portal vein: 53  Left hepatic vein: 30  Middle hepatic vein: 19  Right hepatic vein: 29  Hepatic artery: 244  Splenic vein at pancreas: 21     Portal vein diameter: 1.2 cm.                                                                      IMPRESSION:   1. Cirrhotic liver morphology.  2. Patent hepatic vessels with appropriately directed flow.  3. Gallbladder sludge without sonographic evidence of cholecystitis       I have reviewed the current diagnostic and laboratory tests.            EGD 5/23/22  Findings:        Grade II varices were found in the lower third of the esophagus from        30-39 cm. They were 9 mm in largest diameter. There was old blood in the        esophagus. Three bands were successfully placed with complete        eradication, resulting in deflation of varices. The bands were placed at        38 cm, 35 cm and 33 cm along one prominent varix. There was no bleeding        during and at the end of the procedure.        The Z-line was regular and was found 39 cm from the incisors.        Hematin (altered blood/coffee-ground-like material) and old red blood        were found in the gastric body and fundus. No active bleeding was        observed..        The duodenal bulb, first portion of the duodenum and second portion of        the duodenum were normal. There was no blood in  the duodenum.   Impression:            - Grade II esophageal varices. Completely eradicated.                          Banded.                          - Z-line regular, 39 cm from the incisors.                          - Hematin (altered blood/coffee-ground-like material)                          in the gastric body.                          - Normal duodenal bulb, first portion of the duodenum                          and second portion of the duodenum.                          - No specimens collected.   Recommendation:        - [facility]                          - Clear liquid diet today.                          - Continue present medications-Ceftriaxone, Octreotide                          and IV Protonix                          - Consider Lactulose 30 cc PO qday to aim for 3-4                          BM's/day                          - Repeat EGD with General in 3-4 weeks-ordered thru                          [facility]                                                                                       Gary Mary MD   ____________________   GARY MARY,   IMPRESSION   Upper gi bleed with acute blood loss anemia-- Grade II esophageal varices s/p banding 5/24/22 (blood/coffee-ground material in gastric body). No further signs of gi bleeding. Suspect thrombocytopenia and drop in Hgb (received 2 units RBCs and platelets 5/26/22) is more related to hemolysis with elevated direct bilirubin, elevated LDH, and low haptoglobin. Continue RBC and platelet transfusion as needed. Appreciate input from oncology.     Etoh hepatitis w/ cirrhosis complicated by portal htn and encephalopathy-- Total bilirubin is down slightly today. Abd Us with duplex shows cirrhosis, but patent hepatic vessels and no significant ascites. Diuretics currently on hold. MELD-Na 28 ( 32 5/25 and 5/26, 28 5/24/22)  Reports last drank alcohol 4/6/22.      Staph aureus bacteremia and urine cx + staph  aureus and e coli-- on Cefazolin per infectious disease. Discussed with infectious disease--SHELL could be considered given staph bacteremia/? Of endocarditis. However, recommend holding off on SHELL at this time given esophageal varices and banding 5/24/22. No absolute contraindication to SHELL, but would be ideal to postpone SHELL at least one week after banding. Thrombocytopenia is also a complicating factor.   Autism/Aspergers  Type 2 diabetes- hypoglycemia, blood sugar control per primary team. Suspect hypoglycemia was related to npo status overnight.          PLAN   No objection to soft low sodium carb-controlled diet.   Continue IV ppi.   Follow Hgb and platelets- transfuse as needed. Appreciate input from heme/onc.     Continue Cefazolin per infectious disease. SHELL might be considered in setting of staph bacteremia, but would recommend holding off on SHELL for at least one week given esophageal varices and banding 5/24/22. (Will further discuss with infectious disease).  Will tentatively plan for repeat egd 3-4 weeks for varices.    Continue Lactulose-- goal 3-4 BMs per day.   Monitor renal function closely-- diuretics are currently on hold.  Electrolyte correction per primary team.     The patient will benefit from physical therapy and consultation with dietician re: low sodium/high protein diet  Encourage ongoing sobriety.   Daily MELD labs.  The patient has f/up at [facility] hepatology arranged for August.         Karlee Chow PA-C  Thank you for the opportunity to participate in the care of this patient.   Please feel free to call me with any questions or concerns.  Phone number [phone].

## 2022-05-28 ENCOUNTER — APPOINTMENT (OUTPATIENT)
Dept: PHYSICAL THERAPY | Facility: CLINIC | Age: 27
DRG: 432 | End: 2022-05-28
Attending: HOSPITALIST
Payer: COMMERCIAL

## 2022-05-28 LAB
ALBUMIN SERPL-MCNC: 2.4 G/DL (ref 3.5–5)
ALP SERPL-CCNC: 99 U/L (ref 45–120)
ALT SERPL W P-5'-P-CCNC: 36 U/L (ref 0–45)
ANION GAP SERPL CALCULATED.3IONS-SCNC: 6 MMOL/L (ref 5–18)
AST SERPL W P-5'-P-CCNC: 91 U/L (ref 0–40)
BILIRUB DIRECT SERPL-MCNC: 4.7 MG/DL
BILIRUB SERPL-MCNC: 17.7 MG/DL (ref 0–1)
BUN SERPL-MCNC: 21 MG/DL (ref 8–22)
CALCIUM SERPL-MCNC: 8.4 MG/DL (ref 8.5–10.5)
CHLORIDE BLD-SCNC: 97 MMOL/L (ref 98–107)
CO2 SERPL-SCNC: 29 MMOL/L (ref 22–31)
CREAT SERPL-MCNC: 0.64 MG/DL (ref 0.7–1.3)
ERYTHROCYTE [DISTWIDTH] IN BLOOD BY AUTOMATED COUNT: 19.7 % (ref 10–15)
ERYTHROCYTE [DISTWIDTH] IN BLOOD BY AUTOMATED COUNT: 20.3 % (ref 10–15)
GFR SERPL CREATININE-BSD FRML MDRD: >90 ML/MIN/1.73M2
GLUCOSE BLD-MCNC: 227 MG/DL (ref 70–125)
GLUCOSE BLDC GLUCOMTR-MCNC: 138 MG/DL (ref 70–99)
GLUCOSE BLDC GLUCOMTR-MCNC: 184 MG/DL (ref 70–99)
GLUCOSE BLDC GLUCOMTR-MCNC: 201 MG/DL (ref 70–99)
GLUCOSE BLDC GLUCOMTR-MCNC: 250 MG/DL (ref 70–99)
HCT VFR BLD AUTO: 20.6 % (ref 40–53)
HCT VFR BLD AUTO: 22.7 % (ref 40–53)
HGB BLD-MCNC: 7.1 G/DL (ref 13.3–17.7)
HGB BLD-MCNC: 7.9 G/DL (ref 13.3–17.7)
INR PPP: 2.23 (ref 0.85–1.15)
MCH RBC QN AUTO: 31.6 PG (ref 26.5–33)
MCH RBC QN AUTO: 31.6 PG (ref 26.5–33)
MCHC RBC AUTO-ENTMCNC: 34.5 G/DL (ref 31.5–36.5)
MCHC RBC AUTO-ENTMCNC: 34.8 G/DL (ref 31.5–36.5)
MCV RBC AUTO: 91 FL (ref 78–100)
MCV RBC AUTO: 92 FL (ref 78–100)
PLATELET # BLD AUTO: 47 10E3/UL (ref 150–450)
PLATELET # BLD AUTO: 47 10E3/UL (ref 150–450)
POTASSIUM BLD-SCNC: 4.4 MMOL/L (ref 3.5–5)
PROT SERPL-MCNC: 6.8 G/DL (ref 6–8)
RBC # BLD AUTO: 2.25 10E6/UL (ref 4.4–5.9)
RBC # BLD AUTO: 2.5 10E6/UL (ref 4.4–5.9)
SODIUM SERPL-SCNC: 132 MMOL/L (ref 136–145)
WBC # BLD AUTO: 11.4 10E3/UL (ref 4–11)
WBC # BLD AUTO: 15.3 10E3/UL (ref 4–11)

## 2022-05-28 PROCEDURE — 250N000013 HC RX MED GY IP 250 OP 250 PS 637: Performed by: INTERNAL MEDICINE

## 2022-05-28 PROCEDURE — 250N000011 HC RX IP 250 OP 636: Performed by: INTERNAL MEDICINE

## 2022-05-28 PROCEDURE — 87040 BLOOD CULTURE FOR BACTERIA: CPT | Performed by: INTERNAL MEDICINE

## 2022-05-28 PROCEDURE — 36415 COLL VENOUS BLD VENIPUNCTURE: CPT | Performed by: INTERNAL MEDICINE

## 2022-05-28 PROCEDURE — 97530 THERAPEUTIC ACTIVITIES: CPT | Mod: GP | Performed by: PHYSICAL THERAPIST

## 2022-05-28 PROCEDURE — C9113 INJ PANTOPRAZOLE SODIUM, VIA: HCPCS | Performed by: INTERNAL MEDICINE

## 2022-05-28 PROCEDURE — 97116 GAIT TRAINING THERAPY: CPT | Mod: GP | Performed by: PHYSICAL THERAPIST

## 2022-05-28 PROCEDURE — 82248 BILIRUBIN DIRECT: CPT | Performed by: PHYSICIAN ASSISTANT

## 2022-05-28 PROCEDURE — 99207 PR NO CHARGE LOS: CPT | Performed by: INTERNAL MEDICINE

## 2022-05-28 PROCEDURE — 250N000013 HC RX MED GY IP 250 OP 250 PS 637: Performed by: HOSPITALIST

## 2022-05-28 PROCEDURE — 250N000013 HC RX MED GY IP 250 OP 250 PS 637: Performed by: STUDENT IN AN ORGANIZED HEALTH CARE EDUCATION/TRAINING PROGRAM

## 2022-05-28 PROCEDURE — 120N000004 HC R&B MS OVERFLOW

## 2022-05-28 PROCEDURE — 85027 COMPLETE CBC AUTOMATED: CPT | Performed by: HOSPITALIST

## 2022-05-28 PROCEDURE — 36415 COLL VENOUS BLD VENIPUNCTURE: CPT | Performed by: HOSPITALIST

## 2022-05-28 PROCEDURE — 85610 PROTHROMBIN TIME: CPT | Performed by: PHYSICIAN ASSISTANT

## 2022-05-28 PROCEDURE — 99233 SBSQ HOSP IP/OBS HIGH 50: CPT | Performed by: HOSPITALIST

## 2022-05-28 PROCEDURE — 97161 PT EVAL LOW COMPLEX 20 MIN: CPT | Mod: GP | Performed by: PHYSICAL THERAPIST

## 2022-05-28 PROCEDURE — 250N000013 HC RX MED GY IP 250 OP 250 PS 637: Performed by: PHYSICIAN ASSISTANT

## 2022-05-28 RX ORDER — LACTULOSE 10 G/15ML
20 SOLUTION ORAL 4 TIMES DAILY
Status: DISCONTINUED | OUTPATIENT
Start: 2022-05-28 | End: 2022-06-07 | Stop reason: HOSPADM

## 2022-05-28 RX ORDER — LACTULOSE 10 G/15ML
20 SOLUTION ORAL 3 TIMES DAILY
Status: DISCONTINUED | OUTPATIENT
Start: 2022-05-28 | End: 2022-05-28

## 2022-05-28 RX ADMIN — PANTOPRAZOLE SODIUM 40 MG: 40 INJECTION, POWDER, FOR SOLUTION INTRAVENOUS at 21:20

## 2022-05-28 RX ADMIN — FOLIC ACID 1 MG: 1 TABLET ORAL at 09:55

## 2022-05-28 RX ADMIN — PANTOPRAZOLE SODIUM 40 MG: 40 INJECTION, POWDER, FOR SOLUTION INTRAVENOUS at 09:55

## 2022-05-28 RX ADMIN — CEFAZOLIN SODIUM 2 G: 2 INJECTION, SOLUTION INTRAVENOUS at 17:56

## 2022-05-28 RX ADMIN — CEFAZOLIN SODIUM 2 G: 2 INJECTION, SOLUTION INTRAVENOUS at 09:54

## 2022-05-28 RX ADMIN — THERA TABS 1 TABLET: TAB at 09:55

## 2022-05-28 RX ADMIN — FLUOXETINE HYDROCHLORIDE 40 MG: 20 CAPSULE ORAL at 21:21

## 2022-05-28 RX ADMIN — Medication 50 MG: at 09:55

## 2022-05-28 RX ADMIN — INSULIN ASPART 3 UNITS: 100 INJECTION, SOLUTION INTRAVENOUS; SUBCUTANEOUS at 14:19

## 2022-05-28 RX ADMIN — LACTULOSE 20 G: 10 SOLUTION ORAL at 14:21

## 2022-05-28 RX ADMIN — LACTULOSE 20 G: 10 SOLUTION ORAL at 09:55

## 2022-05-28 RX ADMIN — LACTULOSE 20 G: 10 SOLUTION ORAL at 17:57

## 2022-05-28 RX ADMIN — CEFAZOLIN SODIUM 2 G: 2 INJECTION, SOLUTION INTRAVENOUS at 00:05

## 2022-05-28 RX ADMIN — INSULIN ASPART 2 UNITS: 100 INJECTION, SOLUTION INTRAVENOUS; SUBCUTANEOUS at 10:11

## 2022-05-28 ASSESSMENT — ACTIVITIES OF DAILY LIVING (ADL)
ADLS_ACUITY_SCORE: 24

## 2022-05-28 NOTE — PROGRESS NOTES
05/28/22 1349   Quick Adds   Type of Visit Initial PT Evaluation   Living Environment   People in Home parent(s)   Current Living Arrangements house   Home Accessibility stairs to enter home;stairs within home   Number of Stairs, Main Entrance 2;10   Stair Railings, Main Entrance none;railing on left side (ascending)   Number of Stairs, Within Home, Primary seven   Stair Railings, Within Home, Primary railing on left side (ascending)   Transportation Anticipated family or friend will provide   Living Environment Comments split level flight up an ddown L rail both; indepdnent mobility at baseline   General Information   Onset of Illness/Injury or Date of Surgery 05/28/22   Referring Physician Saeid Gonzalez MD   Patient/Family Therapy Goals Statement (PT) return to home with parental help   Pertinent History of Current Problem (include personal factors and/or comorbidities that impact the POC) alcohoilc cirrhosis, encephalopathy, anemia   Cognition   Affect/Mental Status (Cognition) WFL   Orientation Status (Cognition) oriented x 4   Posture    Posture Forward head position;Protracted shoulders;Kyphosis;Lordosis   Strength (Manual Muscle Testing)   Strength Comments generalized weakness   Transfers   Transfers sit-stand transfer   Sit-Stand Transfer   Sit-Stand [place] (Transfers) modified independence   Assistive Device (Sit-Stand Transfers) walker, front-wheeled   Comment, (Sit-Stand Transfer) slight instability with initial standing   Gait/Stairs (Locomotion)   [place] Level (Gait) supervision   Assistive Device (Gait) walker, front-wheeled   Distance in Feet (Required for LE Total Joints) 600   Pattern (Gait) step-through   Deviations/Abnormal Patterns (Gait) jazmyn decreased  (slight M-L instability)   Comment, (Gait/Stairs) Did not attempt stairs at evaluation   Balance   Balance Comments fair/good balance   Clinical Impression   Criteria for Skilled Therapeutic Intervention Yes, treatment  indicated   PT Diagnosis (PT) impired functional mobility   Influenced by the following impairments impaired balance, strength   Functional limitations due to impairments impaired gait, stair climbing   Clinical Presentation (PT Evaluation Complexity) Evolving/Changing   Clinical Presentation Rationale presents as diagnosed   Clinical Decision Making (Complexity) moderate complexity   Planned Therapy Interventions (PT) gait training;home exercise program;stair training;transfer training   Anticipated Equipment Needs at Discharge (PT) walker, rolling   Risk & Benefits of therapy have been explained evaluation/treatment results reviewed;care plan/treatment goals reviewed;risks/benefits reviewed;current/potential barriers reviewed;participants voiced agreement with care plan;participants included;patient   PT Discharge Planning   PT Discharge Recommendation (DC Rec) home with assist   PT Rationale for DC Rec Safe discharge to home with family assistance feasible given achievement of therapy goals.   Plan of Care Review   Plan of Care Reviewed With patient;mother   Total Evaluation Time   Total Evaluation Time (Minutes) 20   Physical Therapy Goals   PT Frequency One time eval and treatment only   PT Predicted Duration/Target Date for Goal Attainment 05/28/22   PT Goals Transfers;Gait;Stairs;PT Goal 1   PT: Transfers Independent   PT: Gait Independent;Greater than 200 feet   PT: Stairs 7 stairs;Rail on left;Independent   PT: Goal 1 I with LE HEP

## 2022-05-28 NOTE — PROGRESS NOTES
"[facility] Progress Note       SUBJECTIVE:  Patient is confused during my visit. Per mom he has not had a BM since yesterday morning. Last BM was brown. Patient denies any abdominal pain. Mom would like him to have more to eat as he has only been eating jell-o.        OBJECTIVE:  /67 (BP Location: Right arm)   Pulse 86   Temp 97.6  F (36.4  C) (Oral)   Resp 20   Ht 1.676 m (5' 6\")   Wt 60.2 kg (132 lb 12.8 oz)   SpO2 94%   BMI 21.43 kg/m    Temp (24hrs), Av  F (36.7  C), Min:97.6  F (36.4  C), Max:98.5  F (36.9  C)    Patient Vitals for the past 72 hrs:   Weight   22 0508 60.2 kg (132 lb 12.8 oz)   22 1225 59.6 kg (131 lb 4.8 oz)   22 0438 59 kg (130 lb 1.6 oz)       Intake/Output Summary (Last 24 hours) at 2022 1231  Last data filed at 2022 0500  Gross per 24 hour   Intake 720 ml   Output 1080 ml   Net -360 ml        PHYSICAL EXAM  GEN: NAD, chronically ill male appears stated age lying in bed  HRT: no LE edema  RESP: unlabored  ABD: distended, +BS, semi-soft, nontender  SKIN: + jaundice, no rash  NEURO: Confused for most of my visit      Additional Data:  I have reviewed the patient's new clinical lab results:     Recent Labs   Lab Test 22  0506 22  1927 22  1206 22  0638 22  1445 22  0928   WBC 11.4* 12.5* 11.5* 12.1*   < > 11.5*   HGB 7.1* 7.7* 7.8* 6.9*   < > 7.2*   MCV 92 89 91 93   < > 98   PLT 47* 60* 66* 73*   < > 44*   INR 2.23*  --   --  2.14*  --  2.54*    < > = values in this interval not displayed.     Recent Labs   Lab Test 22  0506 22  0638 22  0928   POTASSIUM 4.4 4.2 5.0   CHLORIDE 97* 100 94*   CO2 29 30 24   BUN 21 28* 34*   ANIONGAP 6 5 9     Recent Labs   Lab Test 22  0506 22  0638 22  0928 22  0619 22  2300 22  0622  2307   ALBUMIN 2.4* 2.5* 2.8*   < >  --    < > 3.7   BILITOTAL 17.7* 17.4* 21.9*   < >  --    < > 11.7*   ALT 36 42 55*   < >  " --    < > 131*   AST 91* 98* 107*   < >  --    < > 237*   PROTEIN  --   --   --   --  Negative  --   --    LIPASE  --   --   --   --   --   --  45    < > = values in this interval not displayed.         Imaging results:  US abd hepatic/portal vasculature 5/24/22:  FINDINGS:   PANCREAS: Normal where visualized, without focal mass identified. No pancreatic ductal dilatation is identified.     LIVER: The liver is coarsened in echogenicity with a peripheral nodular contour. Gallbladder sludge is noted biliary ductal dilatation.     OTHER: Small volume free fluid adjacent to the gallbladder.     Velocity (centimeters per second):  Main portal vein: 38  Left portal vein: 20  Right portal vein: 53  Left hepatic vein: 30  Middle hepatic vein: 19  Right hepatic vein: 29  Hepatic artery: 244  Splenic vein at pancreas: 21     Portal vein diameter: 1.2 cm.                                                                      IMPRESSION:   1. Cirrhotic liver morphology.  2. Patent hepatic vessels with appropriately directed flow.  3. Gallbladder sludge without sonographic evidence of cholecystitis.    Procedure results:  EGD 5/24/22 ([facility]):  Findings:        Grade II varices were found in the lower third of the esophagus from        30-39 cm. They were 9 mm in largest diameter. There was old blood in the        esophagus. Three bands were successfully placed with complete        eradication, resulting in deflation of varices. The bands were placed at        38 cm, 35 cm and 33 cm along one prominent varix. There was no bleeding        during and at the end of the procedure.        The Z-line was regular and was found 39 cm from the incisors.        Hematin (altered blood/coffee-ground-like material) and old red blood        were found in the gastric body and fundus. No active bleeding was        observed..        The duodenal bulb, first portion of the duodenum and second portion of        the duodenum were normal. There was no  blood in the duodenum.     Impression:  - Grade II esophageal varices. Completely eradicated.                          Banded.                          - Z-line regular, 39 cm from the incisors.                          - Hematin (altered blood/coffee-ground-like material)                          in the gastric body.                          - Normal duodenal bulb, first portion of the duodenum                          and second portion of the duodenum.                          - No specimens collected.   Recommendation:        - [facility].                          - Clear liquid diet today.                          - Continue present medications-Ceftriaxone, Octreotide                          and IV Protonix                          - Consider Lactulose 30 cc PO qday to aim for 3-4                          BM's/day                          - Repeat EGD with General in 3-4 weeks-ordered thru                          [facility]      IMPRESSION:  Alcoholic cirrhosis with ascites  Esophageal varices with bleed  Hepatic encephalopathy  27 y/o male with PMH Asperger's, anxiety/depression, and alcoholic cirrhosis diagnosed 4/2022 sober since 4/6/22 admitted 5/24 for hematemesis after presenting with coffee-ground emesis and   Hemoglobin 5.3. EGD 5/24 showed grade II varices and blood/coffee-ground material in the stomach, varices banded. He is now having brown stools and hemoglobin stable. He is confused despite lactulose but has reportedly not had a BM since yesterday morning so this dose will be increased. He is not eating more than jell-o due to food preferences and I am concerned about sarcopenia and frailty given his liver disease, admission, etc so will start mechanical soft diet. I encouraged him to eat. He has staph aureus bacteremia and urine cx + staph aureus and e coli, ID following and managing abx. SHELL being considered but deferred until at least one week after 5/24  variceal banding.     PLAN:  - Mechanical soft diet  - Continued abx per ID  - Hemoglobin monitoring per medicine  - Increase lactulose from 20 g BID to TID  - Outpatient follow up at [facility] in August as scheduled      (Dr. Barbosa)  [facility]  5/28/2022 12:31 PM  [phone] (office)  ________________________________________________________________________

## 2022-05-28 NOTE — PROVIDER NOTIFICATION
Provider Notified: During a routine check pt seems pretty confused. Please advise.     05/28/22 1200   Cognitive   Cognitive/Neuro/Behavioral WDL X;orientation   Level of Consciousness alert   Arousal Level opens eyes spontaneously   Orientation disoriented to;place;time;situation   Follows Commands yes   Speech slow       New Order: Lactulose order modified with a goal of 3-4 BMS per day. Will administer and continue plan of care.

## 2022-05-28 NOTE — PROGRESS NOTES
[facility]  Afebrile  BC remain negative  Can stop ordeing BC  Continue current plan    Consuelo Wallace M.D.

## 2022-05-28 NOTE — PROGRESS NOTES
[facility] Medicine PROGRESS NOTE      Identification/Summary:   Dillon Salter is a 26-year-old male with history of Autism/Asperger, DM2, anxiety, alcohol abuse x 8.5 years complicated by cirrhosis with portal hypertension and recent hospitalization 04/06-04/12  for aspiration pneumonia, in the setting of alcohol intoxication and alcoholic hepatitis. Admitted to [facility] on May 23 due to concerns of severe anemia secondary to hematemesis, encephalopathy and general body weakness.    His evaluation here has been quite complicated with acute on chronic anemia due to blood loss and hemolytic anemia, staph aureus bacteremia, staph and E. coli in urine.  Being treated with frequent blood transfusions, platelet transfusions.  Followed by gastroenterology, infectious disease, and hematology. Ordered lactulose to be more frequent with hold parameters after 3-4 moderate/large BMs, as he has had 0 BMs by early afternoon and per RN report became more encephalopathic and confused, saying nonsensical statements.     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  Presented with hemoglobin of 5.3, in the setting of upper GI bleeding/hematemesis  -Got 2 units of packed red blood cells in ED. despite this hemoglobin dropped again  -Currently no evidence of GI blood loss, acute drop due to hemolysis  -Transfused another 2 units on the evening of 5/26 as well as 2 units of platelets  -Appreciate hematology consult  -Hgb 7.1 today  -T bilirubin is 17.7     Alcoholic liver disease, severe  History of alcohol abuse with alcoholic hepatitis, sober for greater than 1 month  Portal hypertension on imaging  Coagulopathy  Thrombocytopenia  Encephalopathy  Elevated ammonia  Severe malnutrition  -EGD completed May 24 with large esophageal varices, banded  -Advance to soft  diet  -Continue IV PPI twice daily  -Got platelets transfusion 05/24; transfuse for bleeding and plts< 50,000   -Resumed p.o. and rectal lactulose, with goal of 3 BMs per day.    -Holding home furosemide, spironolactone  -Abdominal ultrasound with duplex, cirrhotic liver morphology, no PVT, no ascites, no cholecystitis  -Trend CBC LFTs and INR daily  -Outpatient follow-up with hepatology  -Ordered INCREASED lactulose to be more frequent with hold parameters after 3-4 moderate/large BMs, as he has had 0 BMs by early afternoon and per RN report became more encephalopathic and confused, saying nonsensical statements.      MSSA bacteremia  UTI with staph aureus and E. coli  -appreciate infectious disease consult, on cefazolin  -Now has midline    Moderate hyponatremia, acute on chronic, baseline sodium likely around 130  -Likely in the setting of chronic liver disease   -Clinically not hypervolemic  -Improved with normal saline infusion, likely hypovolemic hyponatremia  -Not due to excessive free water intake     Hyperkalemia  -Potassium on admission 5.4.   Subsequently up to 6.5   -Hyperkalemia protocol, close monitoring  -Discontinued spironolactone  -Keep on telemetry  -Continue to monitor     Hyperglycemia  Diabetes mellitus.   -At home on Lantus 50 units at night, and aspart 5 units 3 times daily  -Quite hypoglycemic this morning, started fluids with dextrose  -Decrease Lantus to 20 units in the morning  -Continue carb counts and sliding scale with meals  -Discontinue IV fluids with dextrose if he becomes hyperglycemic     History of hypertension and dyslipidemia  -We will monitor here and resume blood pressure meds as indicated  -Possibly start nadolol     Autism/Asperger's  -Very pleasant and interactive, high functioning      Interval History/Subjective:  No acute events overnight.    No chest pain, no shortness of breath. No other new complaints.  He is mildly confused; his mother at bedside corroborates this.  Discussed supplementations nutritionally, advancing diet if OK with GI, and changing from Glucerna to Boost. Discussed plan of care with patient. Answered all questions to patient's and his mother's verbalized and stated understanding and satisfaction.    Update: he has had 0 BMs by early afternoon and per RN report, he became more encephalopathic and confused, saying nonsensical statements.       Physical Exam/Objective:  Gen: Thin, no acute distress but appears ill  ENT: Notable scleral icterus b/l  Pulm: lungs are clear without significant bibasilar crackles, no wheezing, breathing comfortably at rest  CV: regular rate and rhythm, trace lower extremity edema  GI: abdomen is soft, no tenderness to palpation, distended, no rigidity   Derm: Jaundiced, scattered bruises  Psych: Appropriate affect  Skin: jaundiced    Medications:     ceFAZolin  2 g Intravenous Q8H     FLUoxetine  40 mg Oral At Bedtime     folic acid  1 mg Oral Daily     insulin aspart   Subcutaneous Daily with breakfast     insulin aspart   Subcutaneous Daily with lunch     insulin aspart   Subcutaneous Daily with supper     insulin aspart  1-7 Units Subcutaneous TID AC     insulin aspart  1-5 Units Subcutaneous At Bedtime     insulin glargine  20 Units Subcutaneous QAM     lactulose  20 g Oral BID     multivitamin, therapeutic  1 tablet Oral Daily     pantoprazole (PROTONIX) IV  40 mg Intravenous BID     sodium chloride (PF)  10 mL Intracatheter Q8H     sodium chloride (PF)  3 mL Intracatheter Q8H     thiamine  50 mg Oral Daily       Saeid Gonzalez MD  Internal Medicine  Hospitalist  [facility]  Phone: #[phone]

## 2022-05-28 NOTE — PROVIDER NOTIFICATION
Three critical labs this morning: Hgb 7.1, platelets 47, and total bilirubin 17.7. Dr. Posada paged; he advised no transfusions at this time.

## 2022-05-28 NOTE — PROGRESS NOTES
"[facility] Hematology and Oncology Inpatient Progress Note    Patient: Dillon Salter  MRN: [id number]  Date of Service: May 27, 2022         Reason for Visit    #.     Assessment and Plan      #.  Acute blood loss anemia on chronic anemia, secondary to variceal bleeding.  #.  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  Moderate to severe thrombocytopenia, secondary to liver disease  #.  Cirrhosis of the liver with portal hypertension, secondary to alcohol use  #.  MSSA bacteremia     He appears to be responding to blood transfusion.  His hemoglobin today is 7.8.  His platelets also responded to platelet transfusion.  No signs of obvious or active bleeding.   Recommended to stay on folic acid, and start vitamin B12 and iron to help anemia.   Discussed about signs and symptoms of bleeding to watch for and stool color is likely not reliable after starting oral iron. He will continue follow-up with hepatology and GI for long-term management of liver disease.   Follow-up with hematology as needed.    ______________________________________________________________________________    History of Present Illness    Mr. Dillon Salter reports that he had loose bowel last night but no bright red blood.  He is on full liquid diet.  No pain or discomfort.  No coughing up blood or vomiting blood.    Review of Systems    Apart from describing in HPI, the remainder of comprehensive ROS was negative.    Physical Exam    BP (!) 140/84 (BP Location: Right arm)   Pulse 78   Temp 98  F (36.7  C) (Oral)   Resp 18   Ht 1.676 m (5' 6\")   Wt 59.6 kg (131 lb 4.8 oz)   SpO2 100%   BMI 21.19 kg/m        General: alert, awake, not in acute distress.  Sitting in a chair.  HEENT: Head: Normal, normocephalic, atraumatic.  Eye: Jaundice  Nose: Normal external nose, mucus membranes and septum.  Pharynx: Normal buccal mucosa. Normal pharynx.  Neck / Thyroid: Supple, no masses, nodes, nodules or " enlargement.  Lymphatics: No abnormally enlarged lymph nodes.  Abdomen: abdomen is soft without significant tenderness, masses, organomegaly or guarding  Extremities: normal strength, tone, and muscle mass  Skin: normal. no rash or abnormalities  CNS: non focal.     Lab Results    Recent Results (from the past 24 hour(s))   Glucose by meter    Collection Time: 05/26/22  9:18 PM   Result Value Ref Range    GLUCOSE BY METER POCT 74 70 - 99 mg/dL   Adult Type and Screen    Collection Time: 05/27/22 12:57 AM   Result Value Ref Range    ABO/RH(D) A NEG     Antibody Screen Negative Negative    SPECIMEN EXPIRATION DATE [id number]    Prepare pheresed platelets (unit)    Collection Time: 05/27/22  1:56 AM   Result Value Ref Range    UNIT ABO/RH A Neg     Unit Number [id number]     Unit Status Issued     Blood Component Type Platelets     Product Code [id number]     CODING SYSTEM [id number]     UNIT TYPE ISBT 0600     ISSUE DATE AND TIME [id number]    Glucose by meter    Collection Time: 05/27/22  2:03 AM   Result Value Ref Range    GLUCOSE BY METER POCT 30 (LL) 70 - 99 mg/dL   Glucose by meter    Collection Time: 05/27/22  2:29 AM   Result Value Ref Range    GLUCOSE BY METER POCT 116 (H) 70 - 99 mg/dL   Glucose by meter    Collection Time: 05/27/22  6:37 AM   Result Value Ref Range    GLUCOSE BY METER POCT 34 (LL) 70 - 99 mg/dL   CBC with platelets    Collection Time: 05/27/22  6:38 AM   Result Value Ref Range    WBC Count 12.1 (H) 4.0 - 11.0 10e3/uL    RBC Count 2.12 (L) 4.40 - 5.90 10e6/uL    Hemoglobin 6.9 (LL) 13.3 - 17.7 g/dL    Hematocrit 19.7 (L) 40.0 - 53.0 %    MCV 93 78 - 100 fL    MCH 32.5 26.5 - 33.0 pg    MCHC 35.0 31.5 - 36.5 g/dL    RDW 21.4 (H) 10.0 - 15.0 %    Platelet Count 73 (L) 150 - 450 10e3/uL   Basic metabolic panel    Collection Time: 05/27/22  6:38 AM   Result Value Ref Range    Sodium 135 (L) 136 - 145 mmol/L    Potassium 4.2 3.5 - 5.0 mmol/L    Chloride 100 98 - 107 mmol/L    Carbon Dioxide  (CO2) 30 22 - 31 mmol/L    Anion Gap 5 5 - 18 mmol/L    Urea Nitrogen 28 (H) 8 - 22 mg/dL    Creatinine 0.66 (L) 0.70 - 1.30 mg/dL    Calcium 8.6 8.5 - 10.5 mg/dL    Glucose 36 (LL) 70 - 125 mg/dL    GFR Estimate >90 >60 mL/min/1.73m2   INR    Collection Time: 05/27/22  6:38 AM   Result Value Ref Range    INR 2.14 (H) 0.85 - 1.15   Hepatic panel    Collection Time: 05/27/22  6:38 AM   Result Value Ref Range    Bilirubin Total 17.4 (HH) 0.0 - 1.0 mg/dL    Bilirubin Direct 4.7 (H) <=0.5 mg/dL    Protein Total 7.0 6.0 - 8.0 g/dL    Albumin 2.5 (L) 3.5 - 5.0 g/dL    Alkaline Phosphatase 100 45 - 120 U/L    AST 98 (H) 0 - 40 U/L    ALT 42 0 - 45 U/L   Glucose by meter    Collection Time: 05/27/22  6:44 AM   Result Value Ref Range    GLUCOSE BY METER POCT 31 (LL) 70 - 99 mg/dL   Glucose by meter    Collection Time: 05/27/22  7:04 AM   Result Value Ref Range    GLUCOSE BY METER POCT 127 (H) 70 - 99 mg/dL   Prepare red blood cells (unit)    Collection Time: 05/27/22  7:15 AM   Result Value Ref Range    CROSSMATCH Compatible     UNIT ABO/RH A Neg     Unit Number [id number]     Unit Status Issued     Blood Component Type Red Blood Cells     Product Code [id number]     CODING SYSTEM [id number]     UNIT TYPE ISBT 0600     ISSUE DATE AND TIME [id number]    Glucose by meter    Collection Time: 05/27/22  8:08 AM   Result Value Ref Range    GLUCOSE BY METER POCT 84 70 - 99 mg/dL   Glucose by meter    Collection Time: 05/27/22 11:34 AM   Result Value Ref Range    GLUCOSE BY METER POCT 169 (H) 70 - 99 mg/dL   CBC with platelets    Collection Time: 05/27/22 12:06 PM   Result Value Ref Range    WBC Count 11.5 (H) 4.0 - 11.0 10e3/uL    RBC Count 2.51 (L) 4.40 - 5.90 10e6/uL    Hemoglobin 7.8 (L) 13.3 - 17.7 g/dL    Hematocrit 22.8 (L) 40.0 - 53.0 %    MCV 91 78 - 100 fL    MCH 31.1 26.5 - 33.0 pg    MCHC 34.2 31.5 - 36.5 g/dL    RDW 20.7 (H) 10.0 - 15.0 %    Platelet Count 66 (L) 150 - 450 10e3/uL   Lactic Acid STAT    Collection  Time: 22 12:06 PM   Result Value Ref Range    Lactic Acid 1.7 0.7 - 2.0 mmol/L   Glucose by meter    Collection Time: 22  4:10 PM   Result Value Ref Range    GLUCOSE BY METER POCT 162 (H) 70 - 99 mg/dL   Glucose by meter    Collection Time: 22  5:23 PM   Result Value Ref Range    GLUCOSE BY METER POCT 182 (H) 70 - 99 mg/dL        Imaging    Echocardiogram Complete    Result Date: 2022  [id number]^SYLVIE^ARIAN^[place] [address]  Name: YEAL ROONEY MRN: [id number] : 1995 Study Date: 2022 08:16 AM Age: 26 yrs Gender: Male Patient Location: [facility] Reason For Study: Endocarditis Ordering Physician: ARIAN MERCER Performed By: CASTRO  BSA: 1.7 m2 Height: 66 in Weight: 130 lb ______________________________________________________________________________ ______________________________________________________________________________ Interpretation Summary  Left ventricular size, wall motion and function are normal. The ejection fraction is 60-65%. Normal right ventricle size and systolic function. No vegetations seen. No hemodynamically significant valvular abnormalities on 2D or color flow imaging. ______________________________________________________________________________ Left Ventricle Left ventricular size, wall motion and function are normal. The ejection fraction is 60-65%. There is normal left ventricular wall thickness. Left ventricular diastolic function is normal. No regional wall motion abnormalities noted.  Right Ventricle Normal right ventricle size and systolic function.  Atria Normal left atrial size. Right atrial size is normal. There is no color Doppler evidence of an atrial shunt.  Mitral Valve Mitral valve leaflets appear normal. There is no evidence of mitral stenosis or clinically significant mitral regurgitation.  Tricuspid Valve Tricuspid valve leaflets appear normal. There is no evidence of  tricuspid stenosis or clinically significant tricuspid regurgitation. Right ventricle systolic pressure estimate normal. There is trace tricuspid regurgitation.  Aortic Valve Aortic valve leaflets appear normal. There is no evidence of aortic stenosis or clinically significant aortic regurgitation.  Pulmonic Valve The pulmonic valve is not well seen, but is grossly normal. This degree of valvular regurgitation is within normal limits.  Vessels The aorta root is normal. Normal size ascending aorta. IVC diameter <2.1 cm collapsing >50% with sniff suggests a normal RA pressure of 3 mmHg.  Pericardium There is no pericardial effusion. ______________________________________________________________________________ MMode/2D Measurements & Calculations IVSd: 0.88 cm  LVIDd: 4.7 cm LVIDs: 3.4 cm LVPWd: 1.3 cm FS: 26.5 % LV mass(C)d: 181.0 grams LV mass(C)dI: 108.7 grams/m2 Ao root diam: 2.6 cm LA dimension: 3.7 cm LA/Ao: 1.4 LVOT diam: 1.8 cm LVOT area: 2.6 cm2 LA Volume Indexed (AL/bp): 27.6 ml/m2 RWT: 0.55  Time Measurements MM HR: 81.0 BPM  Doppler Measurements & Calculations MV E max luca: 127.0 cm/sec MV A max luca: 113.5 cm/sec MV E/A: 1.1 MV max P.2 mmHg MV mean PG: 3.8 mmHg MV V2 VTI: 34.3 cm MVA(VTI): 1.6 cm2 MV dec time: 0.17 sec Ao V2 max: 171.0 cm/sec Ao max P.0 mmHg Ao V2 mean: 120.3 cm/sec Ao mean P.8 mmHg Ao V2 VTI: 37.7 cm THOR(I,D): 1.4 cm2 THOR(V,D): 1.4 cm2 LV V1 max PG: 3.2 mmHg LV V1 max: 89.7 cm/sec LV V1 VTI: 20.9 cm SV(LVOT): 54.5 ml SI(LVOT): 32.7 ml/m2 PA acc time: 0.08 sec TR max luca: 224.0 cm/sec TR max P.1 mmHg AV Luca Ratio (DI): 0.52 THOR Index (cm2/m2): 0.87 E/E' av.7 Lateral E/e': 8.0 Medial E/e': 9.3  ______________________________________________________________________________ Report approved by: Sofya Saucedo 2022 09:55 AM       XR Knee AP Standing Bilateral    Result Date: 2022  EXAM: XR KNEE AP STANDING BILATERAL LOCATION: [facility]  HOSPITAL DATE/TIME: 5/25/2022 6:03 PM INDICATION: recent trauma, bacteremia COMPARISON: None.     IMPRESSION: RIGHT KNEE: Normal joint spaces and alignment. No fracture. LEFT KNEE: Normal joint spaces and alignment. No fracture.       Signed by: Tez Riojas MD

## 2022-05-28 NOTE — PLAN OF CARE
Problem: Plan of Care - These are the overarching goals to be used throughout the patient stay.    Goal: Absence of Hospital-Acquired Illness or Injury  Intervention: Identify and Manage Fall Risk  Recent Flowsheet Documentation  Taken 5/28/2022 1000 by Elizabeth Jensen RN  Safety Promotion/Fall Prevention:   patient and family education   nonskid shoes/slippers when out of bed   mobility aid in reach   lighting adjusted   increase visualization of patient   increased rounding and observation   fall prevention program maintained   clutter free environment maintained   activity supervised     Problem: Diabetes Comorbidity  Goal: Blood Glucose Level Within Targeted Range  Outcome: Ongoing, Progressing   Patient initially alert and oriented, had episode of confusion in the afternoon pt didn't know where he was and what is going on. Redirected as needed. Additional lactulose given per order. Pt now up in chair. Worked with PT. Alert and oriented x3 forgetful. VSS. No s/s of bleeding. Denies light headedness, chest pain or shortness of breath. LS clear diminished. Abdomen round and distended. Had BM x1 this shift. Diet advanced per GI. On IV antibiotics Ancef. Will monitor and continue plan of care.

## 2022-05-28 NOTE — PLAN OF CARE
Occupational Therapy    Pt declines OT services; states he is independent with ADLs at this time. Defer to PT for endurance, strengthening. Please re-consult if new needs arise.    Laurita Eduardo, OTR/L 5/28/2022

## 2022-05-29 ENCOUNTER — APPOINTMENT (OUTPATIENT)
Dept: PHYSICAL THERAPY | Facility: CLINIC | Age: 27
DRG: 432 | End: 2022-05-29
Payer: COMMERCIAL

## 2022-05-29 LAB
BLD PROD TYP BPU: NORMAL
BLD PROD TYP BPU: NORMAL
BLOOD COMPONENT TYPE: NORMAL
BLOOD COMPONENT TYPE: NORMAL
CODING SYSTEM: NORMAL
CODING SYSTEM: NORMAL
CREAT SERPL-MCNC: 0.64 MG/DL (ref 0.7–1.3)
CROSSMATCH: NORMAL
CROSSMATCH: NORMAL
ERYTHROCYTE [DISTWIDTH] IN BLOOD BY AUTOMATED COUNT: 19.3 % (ref 10–15)
ERYTHROCYTE [DISTWIDTH] IN BLOOD BY AUTOMATED COUNT: 19.4 % (ref 10–15)
GFR SERPL CREATININE-BSD FRML MDRD: >90 ML/MIN/1.73M2
GLUCOSE BLDC GLUCOMTR-MCNC: 158 MG/DL (ref 70–99)
GLUCOSE BLDC GLUCOMTR-MCNC: 267 MG/DL (ref 70–99)
GLUCOSE BLDC GLUCOMTR-MCNC: 330 MG/DL (ref 70–99)
GLUCOSE BLDC GLUCOMTR-MCNC: 99 MG/DL (ref 70–99)
HCT VFR BLD AUTO: 18.2 % (ref 40–53)
HCT VFR BLD AUTO: 23.2 % (ref 40–53)
HGB BLD-MCNC: 6.3 G/DL (ref 13.3–17.7)
HGB BLD-MCNC: 6.7 G/DL (ref 13.3–17.7)
HGB BLD-MCNC: 8.1 G/DL (ref 13.3–17.7)
ISSUE DATE AND TIME: NORMAL
ISSUE DATE AND TIME: NORMAL
MCH RBC QN AUTO: 30.7 PG (ref 26.5–33)
MCH RBC QN AUTO: 31.3 PG (ref 26.5–33)
MCHC RBC AUTO-ENTMCNC: 34.6 G/DL (ref 31.5–36.5)
MCHC RBC AUTO-ENTMCNC: 34.9 G/DL (ref 31.5–36.5)
MCV RBC AUTO: 88 FL (ref 78–100)
MCV RBC AUTO: 91 FL (ref 78–100)
PLATELET # BLD AUTO: 39 10E3/UL (ref 150–450)
PLATELET # BLD AUTO: 49 10E3/UL (ref 150–450)
RBC # BLD AUTO: 2.01 10E6/UL (ref 4.4–5.9)
RBC # BLD AUTO: 2.64 10E6/UL (ref 4.4–5.9)
UNIT ABO/RH: NORMAL
UNIT ABO/RH: NORMAL
UNIT NUMBER: NORMAL
UNIT NUMBER: NORMAL
UNIT STATUS: NORMAL
UNIT STATUS: NORMAL
UNIT TYPE ISBT: 600
UNIT TYPE ISBT: 600
WBC # BLD AUTO: 13.1 10E3/UL (ref 4–11)
WBC # BLD AUTO: 14.8 10E3/UL (ref 4–11)

## 2022-05-29 PROCEDURE — P9016 RBC LEUKOCYTES REDUCED: HCPCS | Performed by: INTERNAL MEDICINE

## 2022-05-29 PROCEDURE — 250N000013 HC RX MED GY IP 250 OP 250 PS 637: Performed by: HOSPITALIST

## 2022-05-29 PROCEDURE — 82565 ASSAY OF CREATININE: CPT | Performed by: INTERNAL MEDICINE

## 2022-05-29 PROCEDURE — 250N000013 HC RX MED GY IP 250 OP 250 PS 637: Performed by: STUDENT IN AN ORGANIZED HEALTH CARE EDUCATION/TRAINING PROGRAM

## 2022-05-29 PROCEDURE — 85018 HEMOGLOBIN: CPT | Performed by: HOSPITALIST

## 2022-05-29 PROCEDURE — 97116 GAIT TRAINING THERAPY: CPT | Mod: GP | Performed by: PHYSICAL THERAPIST

## 2022-05-29 PROCEDURE — 250N000013 HC RX MED GY IP 250 OP 250 PS 637: Performed by: INTERNAL MEDICINE

## 2022-05-29 PROCEDURE — P9016 RBC LEUKOCYTES REDUCED: HCPCS | Performed by: HOSPITALIST

## 2022-05-29 PROCEDURE — 36415 COLL VENOUS BLD VENIPUNCTURE: CPT | Performed by: HOSPITALIST

## 2022-05-29 PROCEDURE — 99233 SBSQ HOSP IP/OBS HIGH 50: CPT | Performed by: HOSPITALIST

## 2022-05-29 PROCEDURE — 250N000011 HC RX IP 250 OP 636: Performed by: INTERNAL MEDICINE

## 2022-05-29 PROCEDURE — 85027 COMPLETE CBC AUTOMATED: CPT | Performed by: HOSPITALIST

## 2022-05-29 PROCEDURE — 99207 PR NO CHARGE LOS: CPT | Performed by: INTERNAL MEDICINE

## 2022-05-29 PROCEDURE — 120N000004 HC R&B MS OVERFLOW

## 2022-05-29 PROCEDURE — C9113 INJ PANTOPRAZOLE SODIUM, VIA: HCPCS | Performed by: INTERNAL MEDICINE

## 2022-05-29 RX ADMIN — CEFAZOLIN SODIUM 2 G: 2 INJECTION, SOLUTION INTRAVENOUS at 00:42

## 2022-05-29 RX ADMIN — FLUOXETINE HYDROCHLORIDE 40 MG: 20 CAPSULE ORAL at 21:38

## 2022-05-29 RX ADMIN — INSULIN ASPART 1 UNITS: 100 INJECTION, SOLUTION INTRAVENOUS; SUBCUTANEOUS at 16:23

## 2022-05-29 RX ADMIN — CEFAZOLIN SODIUM 2 G: 2 INJECTION, SOLUTION INTRAVENOUS at 09:04

## 2022-05-29 RX ADMIN — LACTULOSE 20 G: 10 SOLUTION ORAL at 09:04

## 2022-05-29 RX ADMIN — PANTOPRAZOLE SODIUM 40 MG: 40 INJECTION, POWDER, FOR SOLUTION INTRAVENOUS at 09:03

## 2022-05-29 RX ADMIN — THERA TABS 1 TABLET: TAB at 09:02

## 2022-05-29 RX ADMIN — FOLIC ACID 1 MG: 1 TABLET ORAL at 09:02

## 2022-05-29 RX ADMIN — INSULIN ASPART 3 UNITS: 100 INJECTION, SOLUTION INTRAVENOUS; SUBCUTANEOUS at 09:03

## 2022-05-29 RX ADMIN — PANTOPRAZOLE SODIUM 40 MG: 40 INJECTION, POWDER, FOR SOLUTION INTRAVENOUS at 21:38

## 2022-05-29 RX ADMIN — Medication 50 MG: at 09:02

## 2022-05-29 RX ADMIN — CEFAZOLIN SODIUM 2 G: 2 INJECTION, SOLUTION INTRAVENOUS at 16:23

## 2022-05-29 ASSESSMENT — ACTIVITIES OF DAILY LIVING (ADL)
ADLS_ACUITY_SCORE: 24

## 2022-05-29 NOTE — PROGRESS NOTES
[facility] Medicine PROGRESS NOTE      Identification/Summary:   Dillon Salter is a 26-year-old male with history of Autism/Asperger, DM2, anxiety, alcohol abuse x 8.5 years complicated by cirrhosis with portal hypertension and recent hospitalization 04/06-04/12  for aspiration pneumonia, in the setting of alcohol intoxication and alcoholic hepatitis. Admitted to [facility] on May 23 due to concerns of severe anemia secondary to hematemesis, encephalopathy and general body weakness.    His evaluation here has been quite complicated with acute on chronic anemia due to blood loss and hemolytic anemia, staph aureus bacteremia, staph and E. coli in urine.  Being treated with frequent blood transfusions, platelet transfusions as per hematology.  Followed by gastroenterology, infectious disease, and hematology. Titrating lactulose to goal of 4 BMs a day. Hgb 6.3 this AM; 1 U pRBC transfused with repeat Hgb still only 6.7; a second U pRBC ordered.     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  Presented with hemoglobin of 5.3, in the setting of upper GI bleeding/hematemesis  -Got 2 units of packed red blood cells in ED. despite this hemoglobin dropped again  -Currently no evidence of GI blood loss, acute drop due to hemolysis  -Transfused another 2 units on the evening of 5/26 as well as 2 units of platelets  -Appreciate hematology consult  -Transfusing 2nd U pRBC now 5/29 given Hgb of 6.7 after 1st U pRBC. Repeat Hgb ordered.      Alcoholic liver disease, severe  History of alcohol abuse with alcoholic hepatitis, sober for greater than 1 month  Portal hypertension on imaging  Coagulopathy  Thrombocytopenia  Encephalopathy  Elevated ammonia  Severe malnutrition  -EGD completed May 24 with large esophageal varices, banded  -Advance to soft  diet  -Continue IV PPI twice daily  -Got platelets transfusion 05/24; transfuse for bleeding and plts< 50,000   -Holding home furosemide, spironolactone  -Abdominal ultrasound with duplex, cirrhotic liver morphology, no PVT, no ascites, no cholecystitis  -Trend CBC LFTs and INR daily  -Outpatient follow-up with hepatology  -Continued increased lactulose to be more frequent with hold parameters after 4 moderate/large BMs; responding better with improved mental status with this  -Outpatient follow up at [facility] in August as scheduled  -Repeat EGD with possible banding at [facility] in 3-4 weeks     MSSA bacteremia  UTI with staph aureus and E. coli  -appreciate infectious disease consult, on cefazolin  -Now has midline    Moderate hyponatremia, acute on chronic, baseline sodium likely around 130  -Likely in the setting of chronic liver disease   -Follow clinically  -Check Na if clinically indicated    Hyperkalemia  -Potassium on admission 5.4.   Subsequently up to 6.5   -Hyperkalemia protocol, close monitoring  -Discontinued spironolactone  -Keep on telemetry  -Continue to monitor     Hyperglycemia  Diabetes mellitus.   -At home on Lantus 50 units at night, and aspart 5 units 3 times daily  -Quite hypoglycemic this morning, started fluids with dextrose  -Decrease Lantus to 20 units in the morning  -Continue carb counts and sliding scale with meals  -Discontinue IV fluids with dextrose if he becomes hyperglycemic     History of hypertension and dyslipidemia  -We will monitor here and resume blood pressure meds as indicated  -Possibly start nadolol     Autism/Asperger's  -Very pleasant and interactive, high functioning      Saeid Gonzalez MD  Internal Medicine  Hospitalist  [facility]  Phone: #[phone]      Interval History/Subjective:  No acute events overnight.    After lactulose dose increase, with BMs his mental status improved and cleared up. Today is completely different than yesterday; he  is alert, oriented, engages in appropriate conversation and answers questions appropriately. His mother at bedside confirms this marked mental status change improvement. However, Hgb is only 6.3 this morning; 1 U ordered, and repeat Hgb 6.7 He and his mother were informed of a 2nd U pRBC to be ordered and Hgb to be checked. He remains very jaundiced and with scleral icterus. No chest pain, no shortness of breath. No other new complaints.  Discussed plan of care with patient. Answered all questions to patient's verbalized and stated understanding and satisfaction.      Physical Exam/Objective:  Gen: Thin, no acute distress but appears thin, frail, and ill  ENT: Notable prominent scleral icterus b/l  Pulm: lungs are clear without significant bibasilar crackles, no wheezing, breathing comfortably at rest  CV: regular rate and rhythm, trace lower extremity edema  GI: abdomen is soft, no tenderness to palpation, distended, no rigidity   Derm: Jaundiced, scattered bruises  Psych: Appropriate affect  Skin: jaundiced throughout     Medications:     ceFAZolin  2 g Intravenous Q8H     FLUoxetine  40 mg Oral At Bedtime     folic acid  1 mg Oral Daily     insulin aspart   Subcutaneous Daily with breakfast     insulin aspart   Subcutaneous Daily with lunch     insulin aspart   Subcutaneous Daily with supper     insulin aspart  1-7 Units Subcutaneous TID AC     insulin aspart  1-5 Units Subcutaneous At Bedtime     insulin glargine  20 Units Subcutaneous QAM     lactulose  20 g Oral 4x Daily     multivitamin, therapeutic  1 tablet Oral Daily     pantoprazole (PROTONIX) IV  40 mg Intravenous BID     sodium chloride (PF)  10 mL Intracatheter Q8H     sodium chloride (PF)  3 mL Intracatheter Q8H     thiamine  50 mg Oral Daily

## 2022-05-29 NOTE — PROGRESS NOTES
"[facility] Digestive Health Progress Note       SUBJECTIVE:  Patient no longer confused. He had at least 4 BMs yesterday and one so far today. No melena/hematochezia. No abdominal pain. He is grateful for diet advancement yesterday.        OBJECTIVE:  /68 (BP Location: Right arm, Patient Position: Chair)   Pulse 100   Temp 98.6  F (37  C) (Oral)   Resp 16   Ht 1.676 m (5' 6\")   Wt 62.1 kg (137 lb)   SpO2 95%   BMI 22.11 kg/m    Temp (24hrs), Av  F (36.7  C), Min:97.6  F (36.4  C), Max:98.5  F (36.9  C)    Patient Vitals for the past 72 hrs:   Weight   22 0310 62.1 kg (137 lb)   22 0508 60.2 kg (132 lb 12.8 oz)   22 1225 59.6 kg (131 lb 4.8 oz)       Intake/Output Summary (Last 24 hours) at 2022 1231  Last data filed at 2022 0500  Gross per 24 hour   Intake 720 ml   Output 1080 ml   Net -360 ml        PHYSICAL EXAM  GEN: NAD, chronically ill male appears stated age sitting up in chair  HRT: no LE edema  RESP: unlabored  ABD: distended, +BS, soft, nontender  SKIN: + jaundice, no rash  NEURO: Alert and oriented, appropriate mood and affect      Additional Data:  I have reviewed the patient's new clinical lab results:     Recent Labs   Lab Test 22  0438 22  1808 22  0506 22  1206 22  0638 22  1445 22  0928   WBC 13.1* 15.3* 11.4*   < > 12.1*   < > 11.5*   HGB 6.3* 7.9* 7.1*   < > 6.9*   < > 7.2*   MCV 91 91 92   < > 93   < > 98   PLT 39* 47* 47*   < > 73*   < > 44*   INR  --   --  2.23*  --  2.14*  --  2.54*    < > = values in this interval not displayed.     Recent Labs   Lab Test 22  0506 22  0638 22  0928   POTASSIUM 4.4 4.2 5.0   CHLORIDE 97* 100 94*   CO2 29 30 24   BUN 21 28* 34*   ANIONGAP 6 5 9     Recent Labs   Lab Test 22  0506 22  0638 22  0928 22  0619 22  2300 22  0624 22  2307   ALBUMIN 2.4* 2.5* 2.8*   < >  --    < > 3.7   BILITOTAL 17.7* 17.4* 21.9*   < >  --    < > " 11.7*   ALT 36 42 55*   < >  --    < > 131*   AST 91* 98* 107*   < >  --    < > 237*   PROTEIN  --   --   --   --  Negative  --   --    LIPASE  --   --   --   --   --   --  45    < > = values in this interval not displayed.       Imaging results:  US abd hepatic/portal vasculature 5/24/22:  FINDINGS:   PANCREAS: Normal where visualized, without focal mass identified. No pancreatic ductal dilatation is identified.     LIVER: The liver is coarsened in echogenicity with a peripheral nodular contour. Gallbladder sludge is noted biliary ductal dilatation.     OTHER: Small volume free fluid adjacent to the gallbladder.     Velocity (centimeters per second):  Main portal vein: 38  Left portal vein: 20  Right portal vein: 53  Left hepatic vein: 30  Middle hepatic vein: 19  Right hepatic vein: 29  Hepatic artery: 244  Splenic vein at pancreas: 21     Portal vein diameter: 1.2 cm.                                                                      IMPRESSION:   1. Cirrhotic liver morphology.  2. Patent hepatic vessels with appropriately directed flow.  3. Gallbladder sludge without sonographic evidence of cholecystitis.    Procedure results:  EGD 5/24/22 ([facility]):  Findings:        Grade II varices were found in the lower third of the esophagus from        30-39 cm. They were 9 mm in largest diameter. There was old blood in the        esophagus. Three bands were successfully placed with complete        eradication, resulting in deflation of varices. The bands were placed at        38 cm, 35 cm and 33 cm along one prominent varix. There was no bleeding        during and at the end of the procedure.        The Z-line was regular and was found 39 cm from the incisors.        Hematin (altered blood/coffee-ground-like material) and old red blood        were found in the gastric body and fundus. No active bleeding was        observed..        The duodenal bulb, first portion of the duodenum and second portion of        the  duodenum were normal. There was no blood in the duodenum.     Impression:  - Grade II esophageal varices. Completely eradicated.                          Banded.                          - Z-line regular, 39 cm from the incisors.                          - Hematin (altered blood/coffee-ground-like material)                          in the gastric body.                          - Normal duodenal bulb, first portion of the duodenum                          and second portion of the duodenum.                          - No specimens collected.   Recommendation:        - [facility]                          - Clear liquid diet today.                          - Continue present medications-Ceftriaxone, Octreotide                          and IV Protonix                          - Consider Lactulose 30 cc PO qday to aim for 3-4                          BM's/day                          - Repeat EGD with General in 3-4 weeks-ordered thru                          [place]      IMPRESSION:  Alcoholic cirrhosis with ascites  Esophageal varices with bleed  Hepatic encephalopathy  25 y/o male with PMH Asperger's, anxiety/depression, and alcoholic cirrhosis diagnosed 4/2022 sober since 4/6/22 admitted 5/24 for hematemesis after presenting with coffee-ground emesis and   Hemoglobin 5.3. EGD 5/24 showed grade II varices and blood/coffee-ground material in the stomach, varices banded. Hemoglobin dropped this AM but pt having brown stools. He was confused yesterday so lactulose dose increased (he received 20 g three times yesterday). No further confusion and he had at least 4 BMs yesterday. He has staph aureus bacteremia and urine cx + staph aureus and e coli, ID following and managing abx. SHELL being considered but deferred until at least one week after 5/24 variceal banding.     PLAN:  - Low sodium diet  - Continued abx per ID  - Hemoglobin monitoring and transfusion per medicine  - Lactulose 20 gm  QID, can hold last dose if patient has already had 4 BMs   - Outpatient follow up at [facility] in August as scheduled  - Repeat EGD with possible banding at [facility] in 3-4 weeks      (Dr. Barbosa)  Cece Heaton PA-C  [facility] Digestive Health  5/29/2022 9:24 AM  [phone] (office)  ________________________________________________________________________

## 2022-05-29 NOTE — PLAN OF CARE
Problem: Bleeding (Gastrointestinal Bleeding)  Goal: Hemostasis  Outcome: Ongoing, Progressing     Problem: Diabetes Comorbidity  Goal: Blood Glucose Level Within Targeted Range  Outcome: Ongoing, Progressing     Problem: Plan of Care - These are the overarching goals to be used throughout the patient stay.    Goal: Absence of Hospital-Acquired Illness or Injury  Intervention: Identify and Manage Fall Risk  Recent Flowsheet Documentation  Taken 5/28/2022 1945 by Valerie Hare, RN  Safety Promotion/Fall Prevention:   activity supervised   lighting adjusted   nonskid shoes/slippers when out of bed   safety round/check completed   patient and family education  Taken 5/28/2022 1545 by Valerie Hare, RN  Safety Promotion/Fall Prevention:   activity supervised   lighting adjusted   nonskid shoes/slippers when out of bed   safety round/check completed   patient and family education   Goal Outcome Evaluation:

## 2022-05-29 NOTE — PLAN OF CARE
Problem: Diabetes Comorbidity  Goal: Blood Glucose Level Within Targeted Range  Outcome: Ongoing, Progressing     Problem: Bleeding (Gastrointestinal Bleeding)  Goal: Hemostasis  Outcome: Ongoing, Progressing     Resting comfortably overnight. No c/o pain. AOx4, NSR on telemetry. Lung sounds clear on room air with SpO2 = 95%. Blood return noted on both ports from midline.     5AM- Critical labs; Hgb = 6.3 down from 7.9, Platelets = 39 down from 47. Discussed with Dr. Posada. One unit of PRBC initiated.

## 2022-05-29 NOTE — PROGRESS NOTES
[facility] Infectious Disease  Afebrile  BC remain negative  Can stop ordeing BC  Continue current plan    Consuelo Wallace M.D.

## 2022-05-29 NOTE — PLAN OF CARE
Problem: Plan of Care - These are the overarching goals to be used throughout the patient stay.    Goal: Absence of Hospital-Acquired Illness or Injury  Intervention: Identify and Manage Fall Risk  Recent Flowsheet Documentation  Taken 5/29/2022 0750 by Elizabeth Jensen RN  Safety Promotion/Fall Prevention:    patient and family education    nonskid shoes/slippers when out of bed    mobility aid in reach    lighting adjusted    increase visualization of patient    increased rounding and observation    fall prevention program maintained    clutter free environment maintained    activity supervised     Problem: Diabetes Comorbidity  Goal: Blood Glucose Level Within Targeted Range  Outcome: Ongoing, Progressing     Problem: Bleeding (Gastrointestinal Bleeding)  Goal: Hemostasis  Outcome: Ongoing, Progressing   Pt up sitting in chair most of this shift. Ambulated in hallway with therapy. Alert and oriented. Had multiple stool this shift. Diet advanced per provider. 1unit of PRBC started prior to this shift completed. Hgb recheck was 6.7. Provider notified, per Dr. Gonzalez will plan to transfuse. New order to transfuse 1unit of PRBC initiated. No c/o chest pain, shortness of breath or no s/s of bleeding. On IV antibiotics Ancef. Plan discharge tomorrow pending medical progression. Will monitor and continue plan of care.

## 2022-05-30 LAB
BACTERIA BLD CULT: NO GROWTH
BACTERIA BLD CULT: NO GROWTH
BASOPHILS # BLD AUTO: 0.2 10E3/UL (ref 0–0.2)
BASOPHILS NFR BLD AUTO: 1 %
EOSINOPHIL # BLD AUTO: 0.2 10E3/UL (ref 0–0.7)
EOSINOPHIL NFR BLD AUTO: 2 %
ERYTHROCYTE [DISTWIDTH] IN BLOOD BY AUTOMATED COUNT: 20.4 % (ref 10–15)
ERYTHROCYTE [DISTWIDTH] IN BLOOD BY AUTOMATED COUNT: 20.9 % (ref 10–15)
GLUCOSE BLDC GLUCOMTR-MCNC: 185 MG/DL (ref 70–99)
GLUCOSE BLDC GLUCOMTR-MCNC: 234 MG/DL (ref 70–99)
GLUCOSE BLDC GLUCOMTR-MCNC: 262 MG/DL (ref 70–99)
GLUCOSE BLDC GLUCOMTR-MCNC: 263 MG/DL (ref 70–99)
HCT VFR BLD AUTO: 20.2 % (ref 40–53)
HCT VFR BLD AUTO: 22.2 % (ref 40–53)
HGB BLD-MCNC: 7.1 G/DL (ref 13.3–17.7)
HGB BLD-MCNC: 7.8 G/DL (ref 13.3–17.7)
IMM GRANULOCYTES # BLD: 0.4 10E3/UL
IMM GRANULOCYTES NFR BLD: 3 %
LYMPHOCYTES # BLD AUTO: 3 10E3/UL (ref 0.8–5.3)
LYMPHOCYTES NFR BLD AUTO: 21 %
MCH RBC QN AUTO: 30.9 PG (ref 26.5–33)
MCH RBC QN AUTO: 31.1 PG (ref 26.5–33)
MCHC RBC AUTO-ENTMCNC: 35.1 G/DL (ref 31.5–36.5)
MCHC RBC AUTO-ENTMCNC: 35.1 G/DL (ref 31.5–36.5)
MCV RBC AUTO: 88 FL (ref 78–100)
MCV RBC AUTO: 88 FL (ref 78–100)
MONOCYTES # BLD AUTO: 1 10E3/UL (ref 0–1.3)
MONOCYTES NFR BLD AUTO: 7 %
NEUTROPHILS # BLD AUTO: 10 10E3/UL (ref 1.6–8.3)
NEUTROPHILS NFR BLD AUTO: 66 %
NRBC # BLD AUTO: 0 10E3/UL
NRBC BLD AUTO-RTO: 0 /100
PLATELET # BLD AUTO: 52 10E3/UL (ref 150–450)
PLATELET # BLD AUTO: 62 10E3/UL (ref 150–450)
RBC # BLD AUTO: 2.3 10E6/UL (ref 4.4–5.9)
RBC # BLD AUTO: 2.51 10E6/UL (ref 4.4–5.9)
WBC # BLD AUTO: 14 10E3/UL (ref 4–11)
WBC # BLD AUTO: 14.8 10E3/UL (ref 4–11)

## 2022-05-30 PROCEDURE — 36415 COLL VENOUS BLD VENIPUNCTURE: CPT | Performed by: HOSPITALIST

## 2022-05-30 PROCEDURE — C9113 INJ PANTOPRAZOLE SODIUM, VIA: HCPCS | Performed by: INTERNAL MEDICINE

## 2022-05-30 PROCEDURE — 120N000004 HC R&B MS OVERFLOW

## 2022-05-30 PROCEDURE — 85027 COMPLETE CBC AUTOMATED: CPT | Performed by: INTERNAL MEDICINE

## 2022-05-30 PROCEDURE — 250N000011 HC RX IP 250 OP 636: Performed by: INTERNAL MEDICINE

## 2022-05-30 PROCEDURE — 250N000013 HC RX MED GY IP 250 OP 250 PS 637: Performed by: STUDENT IN AN ORGANIZED HEALTH CARE EDUCATION/TRAINING PROGRAM

## 2022-05-30 PROCEDURE — 85027 COMPLETE CBC AUTOMATED: CPT | Performed by: HOSPITALIST

## 2022-05-30 PROCEDURE — 250N000013 HC RX MED GY IP 250 OP 250 PS 637: Performed by: INTERNAL MEDICINE

## 2022-05-30 PROCEDURE — 250N000013 HC RX MED GY IP 250 OP 250 PS 637: Performed by: HOSPITALIST

## 2022-05-30 PROCEDURE — 99233 SBSQ HOSP IP/OBS HIGH 50: CPT | Performed by: INTERNAL MEDICINE

## 2022-05-30 RX ORDER — CEFAZOLIN SODIUM 2 G/100ML
2 INJECTION, SOLUTION INTRAVENOUS EVERY 8 HOURS
Qty: 2400 ML | Refills: 0 | Status: SHIPPED | OUTPATIENT
Start: 2022-05-30 | End: 2022-06-07

## 2022-05-30 RX ORDER — FUROSEMIDE 40 MG
40 TABLET ORAL DAILY
Status: DISCONTINUED | OUTPATIENT
Start: 2022-05-30 | End: 2022-06-07 | Stop reason: HOSPADM

## 2022-05-30 RX ADMIN — FUROSEMIDE 40 MG: 40 TABLET ORAL at 18:52

## 2022-05-30 RX ADMIN — INSULIN ASPART 1 UNITS: 100 INJECTION, SOLUTION INTRAVENOUS; SUBCUTANEOUS at 17:52

## 2022-05-30 RX ADMIN — CEFAZOLIN SODIUM 2 G: 2 INJECTION, SOLUTION INTRAVENOUS at 08:29

## 2022-05-30 RX ADMIN — PANTOPRAZOLE SODIUM 40 MG: 40 INJECTION, POWDER, FOR SOLUTION INTRAVENOUS at 08:30

## 2022-05-30 RX ADMIN — Medication 50 MG: at 08:30

## 2022-05-30 RX ADMIN — FLUOXETINE HYDROCHLORIDE 40 MG: 20 CAPSULE ORAL at 21:21

## 2022-05-30 RX ADMIN — THERA TABS 1 TABLET: TAB at 08:30

## 2022-05-30 RX ADMIN — INSULIN ASPART 3 UNITS: 100 INJECTION, SOLUTION INTRAVENOUS; SUBCUTANEOUS at 13:07

## 2022-05-30 RX ADMIN — FOLIC ACID 1 MG: 1 TABLET ORAL at 08:30

## 2022-05-30 RX ADMIN — INSULIN ASPART 2 UNITS: 100 INJECTION, SOLUTION INTRAVENOUS; SUBCUTANEOUS at 08:32

## 2022-05-30 RX ADMIN — LACTULOSE 20 G: 10 SOLUTION ORAL at 08:29

## 2022-05-30 RX ADMIN — CEFAZOLIN SODIUM 2 G: 2 INJECTION, SOLUTION INTRAVENOUS at 17:51

## 2022-05-30 RX ADMIN — CEFAZOLIN SODIUM 2 G: 2 INJECTION, SOLUTION INTRAVENOUS at 01:09

## 2022-05-30 RX ADMIN — PANTOPRAZOLE SODIUM 40 MG: 40 INJECTION, POWDER, FOR SOLUTION INTRAVENOUS at 21:21

## 2022-05-30 ASSESSMENT — ACTIVITIES OF DAILY LIVING (ADL)
ADLS_ACUITY_SCORE: 24

## 2022-05-30 NOTE — PROGRESS NOTES
CLINICAL NUTRITION SERVICES - REASSESSMENT NOTE/CHART REVIEW     Nutrition Prescription    RECOMMENDATIONS FOR MDs/PROVIDERS TO ORDER:  May need further insulin coverage due to high sugars, may also benefit from a DM education consult     Malnutrition Status:    Severe malnutrition noted     Recommendations already ordered by Registered Dietitian (RD):  Modified diet to consistent carbohydrate/2g Na    Future/Additional Recommendations:  none     EVALUATION OF THE PROGRESS TOWARD GOALS   Diet: 2 g Sodium  Nutrition Supplement/Support  Glucerna chocolate tid   Intake: 50-75%       ANTHROPOMETRICS  Admission wt: 52kg  Most Recent Weight: 64kg        PHYSICAL FINDINGS  Edema-+2 bilateral LE edema  GI-rounded, distended; last BM on 5/29  Skin-jaundiced, stage 1 sacrum wound    LABS  Reviewed, glucose-330, 234    MEDICATIONS  Reviewed, folic acid, novolog, lactulose, MVI, thiamine      NUTRITION DIAGNOSIS  Malnutrition related to chronic ETOH abuse as evidenced by 33% wt loss in a year, ongoing reduced po <50% of est needs, severe fat and muscle loss    --evaluation: continued    Goals:  Patient to consume % of nutritionally adequate meals three times per day, or the equivalent with supplements/snacks.-progressing  Promote appropriate wt gain -met, but also having edema  Glycemic control wnl -new     INTERVENTIONS  Implementation  Modified diet to consistent carbohydrate/2g Na  May benefit from a DM education consult       Monitoring/Evaluation  Progress toward goals will be monitored and evaluated per protocol.

## 2022-05-30 NOTE — PLAN OF CARE
Problem: Bleeding (Gastrointestinal Bleeding)  Goal: Hemostasis  Outcome: Ongoing, Progressing   Vital signs stable. Hbg increased to 8.1 from 6.3 after blood and platelets 49 from 39.  Problem: Diabetes Comorbidity  Goal: Blood Glucose Level Within Targeted Range  Outcome: Ongoing, Progressing     and 330 this shift. Insulin coverage given.

## 2022-05-30 NOTE — PROGRESS NOTES
Infectious Diseases Progress Note  [facility]    Date of visit: 05/30/2022       ASSESSMENT   26-year-old man with a history of alcoholic cirrhosis admitted with encephalopathy, jaundice, and upper GI bleeding.    1. MSSA bacteremia.    Repeat cultures negative to date.  No obvious source of infection, however immunocompromise due to liver disease.  Possible trigger of encephalopathy and bleeding.  Recent hospitalization, but no indication that the patient had a central line or other invasive procedure.  Mental status is improved since admission.  Denies history of IV drug use. TTE normal  2. Upper GI bleed.  Patient presenting with coffee-ground emesis.  EGD on 5/24 with esophageal varix  3. Liver disease.  Cirrhosis secondary to alcohol use.  Hepatitis B and C work-up negative on previous admission.  Currently sober since last admission.  Elevated INR and low platelets.  Does not have gross ascites.  Encephalopathy on admission, improving.  Takes lactulose at home.  4. Hyponatremia and hyperkalemia, improving  5. Anemia, thrombocytopenia, and coagulopathy. Getting PRBC and platelets.  6. UTI.  Urine culture on admission with staph aureus and lower colony counts of E. coli.  No urinary symptoms.  Negron catheter currently in place. Both treated with cefazolin. Staph can seed kidneys.         PLAN   Continue cefazolin until 6/7  Has picc  Await other plans per GI etc  Orders written  Discussed w mother    Will sign off, please call with questions    Consuelo Wallace M.D.      ===========================================      SUBJECTIVE / INTERVAL HISTORY:     Had endoscopy  No pain  No fever or chills        Review of Systems     No fevers, no rashes     Antibiotics   Cefazolin 5/26-    Previous:  Ceftriaxone 5/23, 5/25-5/26  Vancomycin 5/24-5/26      Physical Exam     Temp:  [97.9  F (36.6  C)-99.4  F (37.4  C)] 98.3  F (36.8  C)  Pulse:  [] 99  Resp:  [16-19] 18  BP: (120-145)/(58-95) 145/95  SpO2:   "[99 %-100 %] 100 %    BP (!) 145/95 (BP Location: Right arm)   Pulse 99   Temp 98.3  F (36.8  C) (Oral)   Resp 18   Ht 1.676 m (5' 6\")   Wt 64 kg (141 lb 1.6 oz)   SpO2 100%   BMI 22.77 kg/m      Gen. appearance nontoxic  Eyes no conjunctivitis or icterus  Neck no stiffness or neck vein distention, no LN  Heart  No edema  Lungs breathing comfortably  Abdomen soft not tender  Extremities no synovitis, trace edema  Skin  no rash or emboli  Neurologic alert oriented no focal deficits        Cultures   5/23 urine culture: ,000 colonies E. coli, greater than 100,000 colonies staph aureus  5/23 blood cultures 2 of 2: MSSA  5/25 blood cultures x2: no growth to date  5/26 blood culture: no growth to date  5/27 blood culture: pending       Pertinent Labs:     Recent Labs   Lab 05/30/22  0501 05/29/22  1758 05/29/22  1119 05/29/22  0438   WBC 14.0* 14.8*  --  13.1*   HGB 7.1* 8.1* 6.7* 6.3*   PLT 52* 49*  --  39*       Recent Labs   Lab 05/28/22  0506 05/27/22  0638 05/26/22  0928   * 135* 127*   CO2 29 30 24   BUN 21 28* 34*   ALBUMIN 2.4* 2.5* 2.8*   ALKPHOS 99 100 128*   ALT 36 42 55*   AST 91* 98* 107*       No results for input(s): CRP in the last 168 hours.    Invalid input(s): SEDRATE        Imaging:     Results for orders placed or performed during the hospital encounter of 05/23/22   XR Chest Port 1 View    Impression    IMPRESSION: Negative chest.   US Abd Hepatic & Portal Vasculature Cmpl    Impression    IMPRESSION:   1. Cirrhotic liver morphology.  2. Patent hepatic vessels with appropriately directed flow.  3. Gallbladder sludge without sonographic evidence of cholecystitis.   XR Knee AP Standing Bilateral    Impression    IMPRESSION:   RIGHT KNEE: Normal joint spaces and alignment. No fracture.    LEFT KNEE: Normal joint spaces and alignment. No fracture.   Echocardiogram Complete   Result Value Ref Range    LVEF  60-65%          Data reviewed today: I reviewed all medications, new labs and " imaging results over the last 24 hours. I personally reviewed no images or EKG's today.  The patient's care was discussed with the Bedside Nurse, Patient and Patient's Family.

## 2022-05-30 NOTE — PLAN OF CARE
Problem: Plan of Care - These are the overarching goals to be used throughout the patient stay.    Goal: Optimal Comfort and Wellbeing  Outcome: Ongoing, Progressing   Goal Outcome Evaluation:      Pt. Will rest tonight with minimal rest and sleep interruptions.

## 2022-05-30 NOTE — PROGRESS NOTES
Care Management Follow Up    Length of Stay (days): 6    Expected Discharge Date: 05/31/2022     Concerns to be Addressed:       Patient plan of care discussed at interdisciplinary rounds: Yes    Anticipated Discharge Disposition:       Anticipated Discharge Services:    Anticipated Discharge DME:      Patient/family educated on Medicare website which has current facility and service quality ratings:    Education Provided on the Discharge Plan:    Patient/Family in Agreement with the Plan:      Referrals Placed by CM/SW:    Private pay costs discussed: Not applicable    Additional Information:  1:51 PM  SW met with patient to discuss OP PT recommendation.  Pt in agreement and accepted list of OP PT clinics.  Pt will follow up on his own to schedule.        MILLER Diaz

## 2022-05-30 NOTE — PROGRESS NOTES
[facility] Medicine PROGRESS NOTE      Identification/Summary:   Dillon Salter is a 26-year-old male with history of Autism/Asperger, DM2, anxiety, alcohol abuse x 8.5 years complicated by cirrhosis with portal hypertension and recent hospitalization 04/06-04/12  for aspiration pneumonia, in the setting of alcohol intoxication and alcoholic hepatitis. Admitted to [facility] on May 23 due to concerns of severe anemia secondary to hematemesis, encephalopathy and general body weakness.    His evaluation here has been quite complicated with acute on chronic anemia due to blood loss and hemolytic anemia, staph aureus bacteremia, staph and E. coli in urine.  Being treated with frequent blood transfusions, platelet transfusions as per hematology.  Followed by gastroenterology, infectious disease, and hematology. Titrating lactulose to goal of 4 BMs a day. Hgb 6.3 this AM; 1 U pRBC transfused with repeat Hgb still only 6.7; a second U pRBC ordered.       5/30 :     Hb at 7.1 again today from 8.1  No overt s/s of active bleeding  INR 2.23  Monitor hb    Plan for iv cefazolin till 6/7, has PICC      Not medically ready for discharge at this time.  Needs stable hb  Plan for home with home health ?      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  Presented with hemoglobin of 5.3, in the setting of upper GI bleeding/hematemesis  -Got 2 units of packed red blood cells in ED. despite this hemoglobin dropped again  -Currently no evidence of GI blood loss, acute drop due to hemolysis  -Transfused another 2 units on the evening of 5/26 as well as 2 units of platelets  -Appreciate hematology consult  -Transfusing 2nd U pRBC now 5/29 given Hgb of 6.7 after 1st U pRBC. Repeat Hgb ordered.      Alcoholic liver disease, severe  History of alcohol abuse with  alcoholic hepatitis, sober for greater than 1 month  Portal hypertension on imaging  Coagulopathy  Thrombocytopenia  Encephalopathy  Elevated ammonia  Severe malnutrition  -EGD completed May 24 with large esophageal varices, banded  -Advance to soft diet  -Continue IV PPI twice daily  -Got platelets transfusion 05/24; transfuse for bleeding and plts< 50,000   -Holding home furosemide, spironolactone  -Abdominal ultrasound with duplex, cirrhotic liver morphology, no PVT, no ascites, no cholecystitis  -Trend CBC LFTs and INR daily  -Outpatient follow-up with hepatology  -Continued increased lactulose to be more frequent with hold parameters after 4 moderate/large BMs; responding better with improved mental status with this  -Outpatient follow up at [facility] in August as scheduled  -Repeat EGD with possible banding at [facility] in 3-4 weeks     MSSA bacteremia  UTI with staph aureus and E. coli  -appreciate infectious disease consult, on cefazolin  -Now has midline    Moderate hyponatremia, acute on chronic, baseline sodium likely around 130  -Likely in the setting of chronic liver disease   -Follow clinically  -Check Na if clinically indicated    Hyperkalemia  -Potassium on admission 5.4.   Subsequently up to 6.5   -Hyperkalemia protocol, close monitoring  -Discontinued spironolactone  -Keep on telemetry  -Continue to monitor     Hyperglycemia  Diabetes mellitus.   -At home on Lantus 50 units at night, and aspart 5 units 3 times daily  -Quite hypoglycemic this morning, started fluids with dextrose  -Decrease Lantus to 20 units in the morning  -Continue carb counts and sliding scale with meals  -Discontinue IV fluids with dextrose if he becomes hyperglycemic     History of hypertension and dyslipidemia  -We will monitor here and resume blood pressure meds as indicated  -Possibly start nadolol     Autism/Asperger's  -Very pleasant and interactive, high functioning            Physical Exam/Objective:  Gen: Thin, no acute  distress but appears thin, frail, and ill  ENT: Notable prominent scleral icterus b/l  Pulm: lungs are clear without significant bibasilar crackles, no wheezing, breathing comfortably at rest  CV: regular rate and rhythm, trace lower extremity edema  GI: abdomen is soft, no tenderness to palpation, distended, no rigidity   Derm: Jaundiced, scattered bruises  Psych: Appropriate affect  Skin: jaundiced throughout     Medications:     ceFAZolin  2 g Intravenous Q8H     FLUoxetine  40 mg Oral At Bedtime     folic acid  1 mg Oral Daily     insulin aspart   Subcutaneous Daily with breakfast     insulin aspart   Subcutaneous Daily with lunch     insulin aspart   Subcutaneous Daily with supper     insulin aspart  1-7 Units Subcutaneous TID AC     insulin aspart  1-5 Units Subcutaneous At Bedtime     insulin glargine  20 Units Subcutaneous QAM     lactulose  20 g Oral 4x Daily     multivitamin, therapeutic  1 tablet Oral Daily     pantoprazole (PROTONIX) IV  40 mg Intravenous BID     sodium chloride (PF)  10 mL Intracatheter Q8H     sodium chloride (PF)  3 mL Intracatheter Q8H     thiamine  50 mg Oral Daily

## 2022-05-30 NOTE — PLAN OF CARE
Goal Outcome Evaluation:    Problem: Plan of Care - These are the overarching goals to be used throughout the patient stay.    Goal: Plan of Care Review/Shift Note  Outcome: Ongoing, Progressing     Patient up in chair most of shift. Up ad melissa independently. Encouraged elevation of lower extremities as increased LE edema noted. Patient reported 4 loose stools this shift. Lactulose given x1 but held this afternoon. Blood sugars > 200, covered with Novolog sliding scale and carb count, Lantus this AM.

## 2022-05-30 NOTE — PROGRESS NOTES
"[facility] Progress Note       SUBJECTIVE:  Patient is tired today and resting. He is eating well and otherwise feels fine.        OBJECTIVE:  BP (!) 145/95 (BP Location: Right arm)   Pulse 99   Temp 98.3  F (36.8  C) (Oral)   Resp 18   Ht 1.676 m (5' 6\")   Wt 64 kg (141 lb 1.6 oz)   SpO2 100%   BMI 22.77 kg/m    Temp (24hrs), Av  F (36.7  C), Min:97.6  F (36.4  C), Max:98.5  F (36.9  C)    Patient Vitals for the past 72 hrs:   Weight   22 0342 64 kg (141 lb 1.6 oz)   22 0310 62.1 kg (137 lb)   22 0508 60.2 kg (132 lb 12.8 oz)   22 1225 59.6 kg (131 lb 4.8 oz)       Intake/Output Summary (Last 24 hours) at 2022 1231  Last data filed at 2022 0500  Gross per 24 hour   Intake 720 ml   Output 1080 ml   Net -360 ml        PHYSICAL EXAM  GEN: NAD, chronically ill male appears stated age lying in bed under the covers/warmer  HRT: no LE edema  RESP: unlabored  ABD: distended, soft, nontender  SKIN: + jaundice, no rash  NEURO: Alert and oriented, appropriate mood and affect      Additional Data:  I have reviewed the patient's new clinical lab results:     Recent Labs   Lab Test 22  0501 22  1758 22  1119 22  0438 22  1808 22  0506 22  1206 22  0638 22  1445 22  0928   WBC 14.0* 14.8*  --  13.1*   < > 11.4*   < > 12.1*   < > 11.5*   HGB 7.1* 8.1* 6.7* 6.3*   < > 7.1*   < > 6.9*   < > 7.2*   MCV 88 88  --  91   < > 92   < > 93   < > 98   PLT 52* 49*  --  39*   < > 47*   < > 73*   < > 44*   INR  --   --   --   --   --  2.23*  --  2.14*  --  2.54*    < > = values in this interval not displayed.     Recent Labs   Lab Test 22  0506 22  0638 22  09   POTASSIUM 4.4 4.2 5.0   CHLORIDE 97* 100 94*   CO2 29 30 24   BUN 21 28* 34*   ANIONGAP 6 5 9     Recent Labs   Lab Test 22  0506 22  0638 22  0928 22  0619 22  2300 22  0624 22  2307   ALBUMIN 2.4* 2.5* 2.8*   " < >  --    < > 3.7   BILITOTAL 17.7* 17.4* 21.9*   < >  --    < > 11.7*   ALT 36 42 55*   < >  --    < > 131*   AST 91* 98* 107*   < >  --    < > 237*   PROTEIN  --   --   --   --  Negative  --   --    LIPASE  --   --   --   --   --   --  45    < > = values in this interval not displayed.       Imaging results:  US abd hepatic/portal vasculature 5/24/22:  FINDINGS:   PANCREAS: Normal where visualized, without focal mass identified. No pancreatic ductal dilatation is identified.     LIVER: The liver is coarsened in echogenicity with a peripheral nodular contour. Gallbladder sludge is noted biliary ductal dilatation.     OTHER: Small volume free fluid adjacent to the gallbladder.     Velocity (centimeters per second):  Main portal vein: 38  Left portal vein: 20  Right portal vein: 53  Left hepatic vein: 30  Middle hepatic vein: 19  Right hepatic vein: 29  Hepatic artery: 244  Splenic vein at pancreas: 21     Portal vein diameter: 1.2 cm.                                                                  IMPRESSION:   1. Cirrhotic liver morphology.  2. Patent hepatic vessels with appropriately directed flow.  3. Gallbladder sludge without sonographic evidence of cholecystitis.    Procedure results:  EGD 5/24/22 ([facility]):  Findings:        Grade II varices were found in the lower third of the esophagus from        30-39 cm. They were 9 mm in largest diameter. There was old blood in the        esophagus. Three bands were successfully placed with complete        eradication, resulting in deflation of varices. The bands were placed at        38 cm, 35 cm and 33 cm along one prominent varix. There was no bleeding        during and at the end of the procedure.        The Z-line was regular and was found 39 cm from the incisors.        Hematin (altered blood/coffee-ground-like material) and old red blood        were found in the gastric body and fundus. No active bleeding was        observed.       The duodenal bulb, first  portion of the duodenum and second portion of        the duodenum were normal. There was no blood in the duodenum.     Impression:  - Grade II esophageal varices. Completely eradicated.                          Banded.                          - Z-line regular, 39 cm from the incisors.                          - Hematin (altered blood/coffee-ground-like material)                          in the gastric body.                          - Normal duodenal bulb, first portion of the duodenum                          and second portion of the duodenum.                          - No specimens collected.   Recommendation:        - [facility]                          - Clear liquid diet today.                          - Continue present medications-Ceftriaxone, Octreotide                          and IV Protonix                          - Consider Lactulose 30 cc PO qday to aim for 3-4                          BM's/day                          - Repeat EGD with General in 3-4 weeks-ordered thru                          [facility]      IMPRESSION:  Alcoholic cirrhosis with ascites  Esophageal varices with bleed  Hepatic encephalopathy  27 y/o male with PMH Asperger's, anxiety/depression, and alcoholic cirrhosis diagnosed 4/2022 sober since 4/6/22 admitted 5/24 for hematemesis after presenting with coffee-ground emesis and hemoglobin 5.3. EGD 5/24 showed grade II varices and blood/coffee-ground material in the stomach, varices banded. Hemoglobin has been dropping but pt having brown stools. He was confused 5/28 so lactulose dose increased with improvement and adequate BMs. He has staph aureus bacteremia and urine cx + staph aureus and e coli, ID following and managing abx. SHELL being considered but deferred until at least one week after 5/24 variceal banding.     PLAN:  - Low sodium and high protein diet  - Encouraged ambulation   - Continued abx per ID  - Hemoglobin monitoring and transfusion  per medicine  - Lactulose 20 gm QID, can hold last dose if patient has already had 4 BMs   - Outpatient follow up at [facility] in August as scheduled  - Repeat EGD with possible banding at [facility] in 3-4 weeks      (Dr. Elias)  Cece Heaton PA-C  [facility]  5/30/2022 10:12AM  [phone] (office)  ________________________________________________________________________

## 2022-05-31 ENCOUNTER — APPOINTMENT (OUTPATIENT)
Dept: ULTRASOUND IMAGING | Facility: CLINIC | Age: 27
DRG: 432 | End: 2022-05-31
Attending: INTERNAL MEDICINE
Payer: COMMERCIAL

## 2022-05-31 ENCOUNTER — APPOINTMENT (OUTPATIENT)
Dept: PHYSICAL THERAPY | Facility: CLINIC | Age: 27
DRG: 432 | End: 2022-05-31
Payer: COMMERCIAL

## 2022-05-31 ENCOUNTER — TELEPHONE (OUTPATIENT)
Dept: TRANSPLANT | Facility: CLINIC | Age: 27
End: 2022-05-31
Payer: COMMERCIAL

## 2022-05-31 LAB
ALBUMIN SERPL-MCNC: 2.8 G/DL (ref 3.5–5)
ALP SERPL-CCNC: 281 U/L (ref 45–120)
ALT SERPL W P-5'-P-CCNC: 32 U/L (ref 0–45)
ANION GAP SERPL CALCULATED.3IONS-SCNC: 8 MMOL/L (ref 5–18)
AST SERPL W P-5'-P-CCNC: 97 U/L (ref 0–40)
BACTERIA BLD CULT: NO GROWTH
BILIRUB DIRECT SERPL-MCNC: 3.8 MG/DL
BILIRUB SERPL-MCNC: 13.7 MG/DL (ref 0–1)
BUN SERPL-MCNC: 18 MG/DL (ref 8–22)
CALCIUM SERPL-MCNC: 8.2 MG/DL (ref 8.5–10.5)
CHLORIDE BLD-SCNC: 94 MMOL/L (ref 98–107)
CO2 SERPL-SCNC: 29 MMOL/L (ref 22–31)
CREAT SERPL-MCNC: 0.6 MG/DL (ref 0.7–1.3)
ERYTHROCYTE [DISTWIDTH] IN BLOOD BY AUTOMATED COUNT: 21.2 % (ref 10–15)
ERYTHROCYTE [DISTWIDTH] IN BLOOD BY AUTOMATED COUNT: 21.7 % (ref 10–15)
GFR SERPL CREATININE-BSD FRML MDRD: >90 ML/MIN/1.73M2
GLUCOSE BLD-MCNC: 259 MG/DL (ref 70–125)
GLUCOSE BLDC GLUCOMTR-MCNC: 243 MG/DL (ref 70–99)
GLUCOSE BLDC GLUCOMTR-MCNC: 265 MG/DL (ref 70–99)
GLUCOSE BLDC GLUCOMTR-MCNC: 291 MG/DL (ref 70–99)
GLUCOSE BLDC GLUCOMTR-MCNC: 292 MG/DL (ref 70–99)
GLUCOSE BLDC GLUCOMTR-MCNC: 317 MG/DL (ref 70–99)
HCT VFR BLD AUTO: 21.1 % (ref 40–53)
HCT VFR BLD AUTO: 23.2 % (ref 40–53)
HGB BLD-MCNC: 7.2 G/DL (ref 13.3–17.7)
HGB BLD-MCNC: 8.1 G/DL (ref 13.3–17.7)
HOLD SPECIMEN: NORMAL
INR PPP: 1.77 (ref 0.85–1.15)
LACTATE SERPL-SCNC: 0.8 MMOL/L (ref 0.7–2)
MAGNESIUM SERPL-MCNC: 1.4 MG/DL (ref 1.8–2.6)
MCH RBC QN AUTO: 31.4 PG (ref 26.5–33)
MCH RBC QN AUTO: 31.6 PG (ref 26.5–33)
MCHC RBC AUTO-ENTMCNC: 34.1 G/DL (ref 31.5–36.5)
MCHC RBC AUTO-ENTMCNC: 34.9 G/DL (ref 31.5–36.5)
MCV RBC AUTO: 90 FL (ref 78–100)
MCV RBC AUTO: 93 FL (ref 78–100)
PATH ICD:: NORMAL
PHOSPHATE SERPL-MCNC: 1 MG/DL (ref 2.5–4.5)
PHOSPHATE SERPL-MCNC: 2.2 MG/DL (ref 2.5–4.5)
PLATELET # BLD AUTO: 71 10E3/UL (ref 150–450)
PLATELET # BLD AUTO: 76 10E3/UL (ref 150–450)
POTASSIUM BLD-SCNC: 3.7 MMOL/L (ref 3.5–5)
PROT PATTERN UR ELPH-IMP: NORMAL
PROT SERPL-MCNC: 7.4 G/DL (ref 6–8)
RBC # BLD AUTO: 2.28 10E6/UL (ref 4.4–5.9)
RBC # BLD AUTO: 2.58 10E6/UL (ref 4.4–5.9)
REVIEWING PATHOLOGIST: NORMAL
SODIUM SERPL-SCNC: 131 MMOL/L (ref 136–145)
TOTAL PROTEIN RANDOM URINE MG/DL: <7 MG/DL
WBC # BLD AUTO: 13.8 10E3/UL (ref 4–11)
WBC # BLD AUTO: 15.3 10E3/UL (ref 4–11)

## 2022-05-31 PROCEDURE — 85610 PROTHROMBIN TIME: CPT | Performed by: INTERNAL MEDICINE

## 2022-05-31 PROCEDURE — 76705 ECHO EXAM OF ABDOMEN: CPT

## 2022-05-31 PROCEDURE — 82310 ASSAY OF CALCIUM: CPT | Performed by: INTERNAL MEDICINE

## 2022-05-31 PROCEDURE — 97116 GAIT TRAINING THERAPY: CPT | Mod: GP

## 2022-05-31 PROCEDURE — 36415 COLL VENOUS BLD VENIPUNCTURE: CPT | Performed by: INTERNAL MEDICINE

## 2022-05-31 PROCEDURE — 250N000013 HC RX MED GY IP 250 OP 250 PS 637: Performed by: HOSPITALIST

## 2022-05-31 PROCEDURE — 84100 ASSAY OF PHOSPHORUS: CPT | Performed by: INTERNAL MEDICINE

## 2022-05-31 PROCEDURE — 250N000013 HC RX MED GY IP 250 OP 250 PS 637: Performed by: STUDENT IN AN ORGANIZED HEALTH CARE EDUCATION/TRAINING PROGRAM

## 2022-05-31 PROCEDURE — C9113 INJ PANTOPRAZOLE SODIUM, VIA: HCPCS | Performed by: INTERNAL MEDICINE

## 2022-05-31 PROCEDURE — 250N000011 HC RX IP 250 OP 636: Performed by: INTERNAL MEDICINE

## 2022-05-31 PROCEDURE — 83605 ASSAY OF LACTIC ACID: CPT | Performed by: INTERNAL MEDICINE

## 2022-05-31 PROCEDURE — 97110 THERAPEUTIC EXERCISES: CPT | Mod: GP

## 2022-05-31 PROCEDURE — 83735 ASSAY OF MAGNESIUM: CPT | Performed by: INTERNAL MEDICINE

## 2022-05-31 PROCEDURE — 250N000009 HC RX 250: Performed by: INTERNAL MEDICINE

## 2022-05-31 PROCEDURE — 258N000003 HC RX IP 258 OP 636: Performed by: INTERNAL MEDICINE

## 2022-05-31 PROCEDURE — 250N000013 HC RX MED GY IP 250 OP 250 PS 637: Performed by: INTERNAL MEDICINE

## 2022-05-31 PROCEDURE — 85027 COMPLETE CBC AUTOMATED: CPT | Performed by: HOSPITALIST

## 2022-05-31 PROCEDURE — 120N000001 HC R&B MED SURG/OB

## 2022-05-31 PROCEDURE — 82248 BILIRUBIN DIRECT: CPT | Performed by: INTERNAL MEDICINE

## 2022-05-31 PROCEDURE — 99233 SBSQ HOSP IP/OBS HIGH 50: CPT | Performed by: INTERNAL MEDICINE

## 2022-05-31 PROCEDURE — 85014 HEMATOCRIT: CPT | Performed by: HOSPITALIST

## 2022-05-31 RX ORDER — FUROSEMIDE 10 MG/ML
20 INJECTION INTRAMUSCULAR; INTRAVENOUS EVERY 8 HOURS
Status: DISCONTINUED | OUTPATIENT
Start: 2022-05-31 | End: 2022-06-02

## 2022-05-31 RX ORDER — GABAPENTIN 100 MG/1
200 CAPSULE ORAL EVERY 4 HOURS PRN
Status: DISCONTINUED | OUTPATIENT
Start: 2022-05-31 | End: 2022-06-07 | Stop reason: HOSPADM

## 2022-05-31 RX ORDER — MAGNESIUM SULFATE 4 G/50ML
4 INJECTION INTRAVENOUS ONCE
Status: COMPLETED | OUTPATIENT
Start: 2022-05-31 | End: 2022-05-31

## 2022-05-31 RX ADMIN — INSULIN ASPART 2 UNITS: 100 INJECTION, SOLUTION INTRAVENOUS; SUBCUTANEOUS at 09:21

## 2022-05-31 RX ADMIN — FLUOXETINE HYDROCHLORIDE 40 MG: 20 CAPSULE ORAL at 21:56

## 2022-05-31 RX ADMIN — PANTOPRAZOLE SODIUM 40 MG: 40 INJECTION, POWDER, FOR SOLUTION INTRAVENOUS at 20:18

## 2022-05-31 RX ADMIN — INSULIN ASPART 4 UNITS: 100 INJECTION, SOLUTION INTRAVENOUS; SUBCUTANEOUS at 12:04

## 2022-05-31 RX ADMIN — MAGNESIUM SULFATE HEPTAHYDRATE 4 G: 80 INJECTION, SOLUTION INTRAVENOUS at 11:40

## 2022-05-31 RX ADMIN — FOLIC ACID 1 MG: 1 TABLET ORAL at 09:18

## 2022-05-31 RX ADMIN — Medication 50 MG: at 09:18

## 2022-05-31 RX ADMIN — LACTULOSE 20 G: 10 SOLUTION ORAL at 16:34

## 2022-05-31 RX ADMIN — FUROSEMIDE 20 MG: 10 INJECTION, SOLUTION INTRAMUSCULAR; INTRAVENOUS at 16:34

## 2022-05-31 RX ADMIN — TRAZODONE HYDROCHLORIDE 50 MG: 50 TABLET ORAL at 00:19

## 2022-05-31 RX ADMIN — POTASSIUM & SODIUM PHOSPHATES POWDER PACK 280-160-250 MG 1 PACKET: 280-160-250 PACK at 22:22

## 2022-05-31 RX ADMIN — CEFAZOLIN SODIUM 2 G: 2 INJECTION, SOLUTION INTRAVENOUS at 09:18

## 2022-05-31 RX ADMIN — PANTOPRAZOLE SODIUM 40 MG: 40 INJECTION, POWDER, FOR SOLUTION INTRAVENOUS at 09:18

## 2022-05-31 RX ADMIN — SODIUM PHOSPHATE, MONOBASIC, MONOHYDRATE 15 MMOL: 276; 142 INJECTION, SOLUTION INTRAVENOUS at 14:10

## 2022-05-31 RX ADMIN — LACTULOSE 20 G: 10 SOLUTION ORAL at 09:17

## 2022-05-31 RX ADMIN — CEFAZOLIN SODIUM 2 G: 2 INJECTION, SOLUTION INTRAVENOUS at 00:19

## 2022-05-31 RX ADMIN — SODIUM PHOSPHATE, MONOBASIC, MONOHYDRATE 15 MMOL: 276; 142 INJECTION, SOLUTION INTRAVENOUS at 12:04

## 2022-05-31 RX ADMIN — FUROSEMIDE 20 MG: 10 INJECTION, SOLUTION INTRAMUSCULAR; INTRAVENOUS at 09:18

## 2022-05-31 RX ADMIN — INSULIN ASPART 4 UNITS: 100 INJECTION, SOLUTION INTRAVENOUS; SUBCUTANEOUS at 17:38

## 2022-05-31 RX ADMIN — CEFAZOLIN SODIUM 2 G: 2 INJECTION, SOLUTION INTRAVENOUS at 16:34

## 2022-05-31 RX ADMIN — THERA TABS 1 TABLET: TAB at 09:18

## 2022-05-31 ASSESSMENT — ACTIVITIES OF DAILY LIVING (ADL)
ADLS_ACUITY_SCORE: 24

## 2022-05-31 NOTE — PROGRESS NOTES
GI PROGRESS NOTE  5/31/2022  Dillon Salter  1995  /-90    Subjective:  He feels well without complaints, and he ate breakfast today.  He's having loose brown stools.  He denies having confusion and mother agrees.     Objective:    Patient Vitals for the past 24 hrs:   BP Temp Temp src Pulse Resp SpO2 Weight   05/31/22 0748 130/78 98.6  F (37  C) Oral 102 20 99 % --   05/31/22 0629 -- -- -- -- -- -- 66.2 kg (146 lb)   05/31/22 0450 132/70 98.3  F (36.8  C) Oral 116 20 94 % --   05/31/22 0008 133/68 98.4  F (36.9  C) Oral 105 20 97 % --   05/30/22 2000 133/66 98.1  F (36.7  C) Oral 101 16 100 % --   05/30/22 1630 124/58 97.4  F (36.3  C) Oral 91 16 97 % --   05/30/22 1200 139/67 98.2  F (36.8  C) Oral 103 16 100 % --     Body mass index is 23.57 kg/m .  Gen: NAD  Skin: jaundice  GI: Mildly distended, BS positive, soft, non-tender    Laboratory  Recent Labs   Lab Test 05/31/22  0625 05/30/22  1930 05/30/22  0501 05/28/22  1808 05/28/22  0506 05/27/22  1206 05/27/22  0638   WBC 13.8* 14.8* 14.0*   < > 11.4*   < > 12.1*   HGB 8.1* 7.8* 7.1*   < > 7.1*   < > 6.9*   MCV 90 88 88   < > 92   < > 93   PLT 71* 62* 52*   < > 47*   < > 73*   INR 1.77*  --   --   --  2.23*  --  2.14*    < > = values in this interval not displayed.     Recent Labs   Lab Test 05/31/22  0752 05/31/22  0625 05/31/22  0217 05/29/22  0817 05/29/22  0438 05/28/22  0953 05/28/22  0506 05/27/22  0644 05/27/22  0638   NA  --  131*  --   --   --   --  132*  --  135*   POTASSIUM  --  3.7  --   --   --   --  4.4  --  4.2   CHLORIDE  --  94*  --   --   --   --  97*  --  100   CO2  --  29  --   --   --   --  29  --  30   BUN  --  18  --   --   --   --  21  --  28*   CR  --  0.60*  --   --  0.64*  --  0.64*  --  0.66*   ANIONGAP  --  8  --   --   --   --  6  --  5   SARTHAK  --  8.2*  --   --   --   --  8.4*  --  8.6   * 259* 317*   < >  --    < > 227*   < > 36*    < > = values in this interval not displayed.     Recent Labs   Lab Test  05/31/22  0625 05/28/22  0506 05/27/22  0638 05/24/22  0619 05/23/22  2300 04/07/22  0624 04/06/22  2307   ALBUMIN 2.8* 2.4* 2.5*   < >  --    < > 3.7   BILITOTAL 13.7* 17.7* 17.4*   < >  --    < > 11.7*   ALT 32 36 42   < >  --    < > 131*   AST 97* 91* 98*   < >  --    < > 237*   ALKPHOS 281* 99 100   < >  --    < > 158*   PROTEIN  --   --   --   --  Negative  --   --    LIPASE  --   --   --   --   --   --  45    < > = values in this interval not displayed.       US abd hepatic/portal vasculature 5/24/22:  FINDINGS:   PANCREAS: Normal where visualized, without focal mass identified. No pancreatic ductal dilatation is identified.     LIVER: The liver is coarsened in echogenicity with a peripheral nodular contour. Gallbladder sludge is noted biliary ductal dilatation.     OTHER: Small volume free fluid adjacent to the gallbladder.     Velocity (centimeters per second):  Main portal vein: 38  Left portal vein: 20  Right portal vein: 53  Left hepatic vein: 30  Middle hepatic vein: 19  Right hepatic vein: 29  Hepatic artery: 244  Splenic vein at pancreas: 21     Portal vein diameter: 1.2 cm.                                                                  IMPRESSION:   1. Cirrhotic liver morphology.  2. Patent hepatic vessels with appropriately directed flow.  3. Gallbladder sludge without sonographic evidence of cholecystitis.     Procedure results:  EGD 5/24/22 ([facility]):  Findings:        Grade II varices were found in the lower third of the esophagus from        30-39 cm. They were 9 mm in largest diameter. There was old blood in the        esophagus. Three bands were successfully placed with complete        eradication, resulting in deflation of varices. The bands were placed at        38 cm, 35 cm and 33 cm along one prominent varix. There was no bleeding        during and at the end of the procedure.        The Z-line was regular and was found 39 cm from the incisors.        Hematin (altered  blood/coffee-ground-like material) and old red blood        were found in the gastric body and fundus. No active bleeding was        observed.       The duodenal bulb, first portion of the duodenum and second portion of        the duodenum were normal. There was no blood in the duodenum.     Impression:  - Grade II esophageal varices. Completely eradicated.                          Banded.                          - Z-line regular, 39 cm from the incisors.                          - Hematin (altered blood/coffee-ground-like material)                          in the gastric body.                          - Normal duodenal bulb, first portion of the duodenum                          and second portion of the duodenum.                          - No specimens collected.   Recommendation:        - [facility].                          - Clear liquid diet today.                          - Continue present medications-Ceftriaxone, Octreotide                          and IV Protonix                          - Consider Lactulose 30 cc PO qday to aim for 3-4                          BM's/day                          - Repeat EGD with General in 3-4 weeks-ordered thru                          [facility]     Assessment:  1. Alcoholic cirrhosis with ascites, admitted with upper GI bleed related to varices S/P EGD with banding 5/24.  Hemoglobin has now been stable without further bleeding.  2.  Mild encephalopathy, on lactulose with dose increased 5/28.  Having brown stools without clinical confusion today.  3.  Staph aureus bacteremia and urine cx positive for staph aureus and e coli, on antibiotics.    Patient Active Problem List   Diagnosis     Bleeding gums     Alcoholic cirrhosis of liver with ascites (H)     Anemia due to blood loss, acute     Diabetes mellitus type 2 in obese (H)     Alcohol intoxication (H)     Open abdominal wall wound     Asperger's disorder     Benign essential  hypertension     Anemia of chronic illness     Alcohol withdrawal, uncomplicated (H)     Alcoholic hepatitis     Anxiety and depression     Balanitis     Encephalopathy     Alcoholic cirrhosis of liver without ascites (H)     Anemia, unspecified type     Hematemesis, presence of nausea not specified        Plan:    1. Continue low sodium and high protein diet.  2.  Antibiotics per primary/ID recs.  3.  Continue lactulose- titrate to a goal of 3-4 BMs daily.  4.  Outpatient EGD planned with [facility] on 6/9/22.  5.  Outpatient Hepatology f/u apparently planned with [facility] in August, but mother worries this is a long time.  I'll also order f/u with [facility] sooner if available.  6.  GI following, but he could maybe discharge soon from a GI standpoint.    Deysi Jauregui PA-C  [facility]  [phone]

## 2022-05-31 NOTE — PLAN OF CARE
Problem: Electrolyte Imbalance  Goal: Electrolyte Balance  Outcome: Ongoing, Progressing  Morning Magnesium 1.4 and Phos 1.0 .. Magnesium replaced via protocol with recheck (6/1) morning.  Phosphorus currently being replaced IV with lab redraw TBD.      Problem: Plan of Care - These are the overarching goals to be used throughout the patient stay.    Goal: Plan of Care Review/Shift Note  Description: The Plan of Care Review/Shift note should be completed every shift.  The Outcome Evaluation is a brief statement about your assessment that the patient is improving, declining, or no change.  This information will be displayed automatically on your shift note.  Outcome: Ongoing, Progressing  Reported severe BLE soreness at rest. BLE edematous and CMS intact. Instructed patient to elevate BLE but refused.  Noted to be sleeping upon reassessment.

## 2022-05-31 NOTE — TELEPHONE ENCOUNTER
Mom (Leticia) called me back as result of message I left regarding getting process of ROBERT Assessment and referral going forward for Dillon if he is interested in moving forward with LT screen process. [facility] was [facility] (Dillon is currently admitted) and wanted to eat her lunch, so I said she could call me back at my desk anytime. Based on early April ER visit and high blood alcohol test (see EMR) as well as his past history of ROBERT diagnosis and ROBERT treatment.     MARC [place]

## 2022-05-31 NOTE — PROVIDER NOTIFICATION
Txt paged Dr. Sterling 10:56 AM 05/31/22     Vitaly RN #[id number]  Room 336: M.F.   Morning magnesium 1.4 and phosphorus 1.0 .. Please order replacement protocols if appropriate. Now transferring to [address] [place] [address]. Thanks    Pending response     **ADDENDUM 11:00 AM 05/31/22 - Protocols ordered.

## 2022-05-31 NOTE — PROGRESS NOTES
[facility] Medicine PROGRESS NOTE      Identification/Summary:   Dillon Salter is a 26-year-old male with history of Autism/Asperger, DM2, anxiety, alcohol abuse x 8.5 years complicated by cirrhosis with portal hypertension and recent hospitalization 04/06-04/12  for aspiration pneumonia, in the setting of alcohol intoxication and alcoholic hepatitis. Admitted to [facility] on May 23 due to concerns of severe anemia secondary to hematemesis, encephalopathy and general body weakness.    His evaluation here has been quite complicated with acute on chronic anemia due to blood loss and hemolytic anemia, staph aureus bacteremia, staph and E. coli in urine.  Being treated with frequent blood transfusions, platelet transfusions as per hematology.  Followed by gastroenterology, infectious disease, and hematology. Titrating lactulose to goal of 4 BMs a day. Hgb 6.3 this AM; 1 U pRBC transfused with repeat Hgb still only 6.7; a second U pRBC ordered.       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  Presented with hemoglobin of 5.3, in the setting of upper GI bleeding/hematemesis  -Got 2 units of packed red blood cells in ED. despite this hemoglobin dropped again  -Currently no evidence of GI blood loss, acute drop due to hemolysis  -Transfused another 2 units on the evening of 5/26 as well as 2 units of platelets  -Appreciate hematology consult  -Transfusing 2nd U pRBC now 5/29 given Hgb of 6.7 after 1st U pRBC.  Repeat Hgb ordered.      Alcoholic liver disease, severe  History of alcohol abuse with alcoholic hepatitis, sober for greater than 1 month  Portal hypertension on imaging  Coagulopathy  Thrombocytopenia  Encephalopathy  Elevated ammonia  Severe malnutrition  -EGD completed May 24 with large esophageal varices, banded  -Advance to soft diet  -Continue IV PPI twice daily  -Got platelets transfusion 05/24; transfuse for bleeding and plts< 50,000   -Holding home furosemide, spironolactone  -Abdominal ultrasound with duplex, cirrhotic liver morphology, no PVT, no ascites, no cholecystitis  -Trend CBC LFTs and INR daily  -Outpatient follow-up with hepatology  -Continued increased lactulose to be more frequent with hold parameters after 4 moderate/large BMs; responding better with improved mental status with this  -Outpatient follow up at [facility] in August as scheduled  -Repeat EGD with possible banding at [facility] in 3-4 weeks     MSSA bacteremia  UTI with staph aureus and E. coli  -appreciate infectious disease consult, on cefazolin  -Now has midline    Moderate hyponatremia, acute on chronic, baseline sodium likely around 130  -Likely in the setting of chronic liver disease   -Follow clinically  -Check Na if clinically indicated    Hyperkalemia  -Potassium on admission 5.4.   Subsequently up to 6.5   -Hyperkalemia protocol, close monitoring  -Discontinued spironolactone  -Keep on telemetry  -Continue to monitor     Hyperglycemia  Diabetes mellitus.   -At home on Lantus 50 units at night, and aspart 5 units 3 times daily  -Quite hypoglycemic this morning, started fluids with dextrose  -Decrease Lantus to 20 units in the morning  -Continue carb counts and sliding scale with meals  -Discontinue IV fluids with dextrose if he becomes hyperglycemic     History of hypertension and dyslipidemia  -We will monitor here and resume blood pressure meds as indicated  -Possibly start nadolol     Autism/Asperger's  -Very pleasant and  interactive, high functioning            Physical Exam/Objective:  Gen: Thin, no acute distress but appears thin, frail, and ill  ENT: Notable prominent scleral icterus b/l  Pulm: lungs are clear without significant bibasilar crackles, no wheezing, breathing comfortably at rest  CV: regular rate and rhythm, trace lower extremity edema  GI: abdomen is soft, no tenderness to palpation, distended, no rigidity   Derm: Jaundiced, scattered bruises  Psych: Appropriate affect  Skin: jaundiced throughout     Medications:     ceFAZolin  2 g Intravenous Q8H     FLUoxetine  40 mg Oral At Bedtime     folic acid  1 mg Oral Daily     furosemide  20 mg Intravenous Q8H     [Held by provider] furosemide  40 mg Oral Daily     insulin aspart   Subcutaneous Daily with breakfast     insulin aspart   Subcutaneous Daily with lunch     insulin aspart   Subcutaneous Daily with supper     insulin aspart  1-7 Units Subcutaneous TID AC     insulin aspart  1-5 Units Subcutaneous At Bedtime     insulin glargine  20 Units Subcutaneous QAM     lactulose  20 g Oral 4x Daily     multivitamin, therapeutic  1 tablet Oral Daily     pantoprazole (PROTONIX) IV  40 mg Intravenous BID     sodium chloride (PF)  10 mL Intracatheter Q8H     sodium chloride (PF)  3 mL Intracatheter Q8H     thiamine  50 mg Oral Daily

## 2022-05-31 NOTE — PLAN OF CARE
Problem: Plan of Care - These are the overarching goals to be used throughout the patient stay.    Goal: Plan of Care Review/Shift Note  Description: The Plan of Care Review/Shift note should be completed every shift.  The Outcome Evaluation is a brief statement about your assessment that the patient is improving, declining, or no change.  This information will be displayed automatically on your shift note.  Outcome: Ongoing, Progressing  Goal: Optimal Comfort and Wellbeing  Outcome: Ongoing, Progressing   Goal Outcome Evaluation:    Pt slept well during the night. VSS with pt on RA. Independent in the room. Pt able to make needs known. IV antibiotic given last night.

## 2022-05-31 NOTE — PROGRESS NOTES
Care Management Follow Up    Length of Stay (days): 7    Expected Discharge Date: 1-2 days   Concerns to be Addressed: IV antibiotics, GI following,  IV lasix     Patient plan of care discussed at interdisciplinary rounds: Yes    Anticipated Discharge Disposition: Home, Home Care, Home Infusion (home with mom and [facility] for IV abx and RN until 6/7, OP PT)  Disposition Comments: Anticipate D/C home with Mom and [facility] for IV abx and RN (teaching to be done with Mom) and OP PT. Mom to transport.  Anticipated Discharge Services:  ([facility] for IV abx and RN until 6/7, OP PT)  Anticipated Discharge DME:  (PICC line)    Patient/family educated on Medicare website which has current facility and service quality ratings:  (CM reviewed HC agency for IV abx, given referral for OP PT)  Education Provided on the Discharge Plan: Will discharge home with mom, [facility] for IV abx and RN, OP PT. AVS will be per bedside RN.    Patient/Family in Agreement with the Plan: yes    Referrals Placed by CM/SW: Homecare  Private pay costs discussed: None indicated.    Additional Information:  Chart reviewed. Per final ID plan noted on 5/30- patient will need IV antibiotics until 6/7. CM sent referral to [facility]- he has coverage for home infusion services. CM also spoke to [facility] at [facility] and he could be managed by [facility] with pump on M/W/F scheduled. CM spoke with Mother, she prefers home infusion option and also that teaching can be done with her. She plans to provide the transportation home from hospital.     [facility]ia[facility] is following and teaching can be arranged with mother prior to hospital discharge.   Rachel Motta, RN

## 2022-06-01 ENCOUNTER — TELEPHONE (OUTPATIENT)
Dept: TRANSPLANT | Facility: CLINIC | Age: 27
End: 2022-06-01
Payer: COMMERCIAL

## 2022-06-01 ENCOUNTER — TELEPHONE (OUTPATIENT)
Dept: GASTROENTEROLOGY | Facility: CLINIC | Age: 27
End: 2022-06-01
Payer: COMMERCIAL

## 2022-06-01 ENCOUNTER — TELEPHONE (OUTPATIENT)
Dept: GASTROENTEROLOGY | Facility: CLINIC | Age: 27
End: 2022-06-01

## 2022-06-01 ENCOUNTER — HOSPITAL ENCOUNTER (OUTPATIENT)
Facility: CLINIC | Age: 27
End: 2022-06-01
Attending: INTERNAL MEDICINE | Admitting: INTERNAL MEDICINE
Payer: COMMERCIAL

## 2022-06-01 LAB
ABO/RH(D): NORMAL
ANION GAP SERPL CALCULATED.3IONS-SCNC: 6 MMOL/L (ref 5–18)
ANION GAP SERPL CALCULATED.3IONS-SCNC: 7 MMOL/L (ref 5–18)
ANTIBODY SCREEN: NEGATIVE
BACTERIA BLD CULT: NO GROWTH
BLD PROD TYP BPU: NORMAL
BLOOD COMPONENT TYPE: NORMAL
BUN SERPL-MCNC: 19 MG/DL (ref 8–22)
BUN SERPL-MCNC: 19 MG/DL (ref 8–22)
CALCIUM SERPL-MCNC: 7.6 MG/DL (ref 8.5–10.5)
CALCIUM SERPL-MCNC: 8.2 MG/DL (ref 8.5–10.5)
CHLORIDE BLD-SCNC: 92 MMOL/L (ref 98–107)
CHLORIDE BLD-SCNC: 93 MMOL/L (ref 98–107)
CO2 SERPL-SCNC: 31 MMOL/L (ref 22–31)
CO2 SERPL-SCNC: 33 MMOL/L (ref 22–31)
CODING SYSTEM: NORMAL
CREAT SERPL-MCNC: 0.57 MG/DL (ref 0.7–1.3)
CREAT SERPL-MCNC: 0.58 MG/DL (ref 0.7–1.3)
CROSSMATCH: NORMAL
ERYTHROCYTE [DISTWIDTH] IN BLOOD BY AUTOMATED COUNT: 21.2 % (ref 10–15)
ERYTHROCYTE [DISTWIDTH] IN BLOOD BY AUTOMATED COUNT: 22.1 % (ref 10–15)
GFR SERPL CREATININE-BSD FRML MDRD: >90 ML/MIN/1.73M2
GFR SERPL CREATININE-BSD FRML MDRD: >90 ML/MIN/1.73M2
GLUCOSE BLD-MCNC: 287 MG/DL (ref 70–125)
GLUCOSE BLD-MCNC: 315 MG/DL (ref 70–125)
GLUCOSE BLDC GLUCOMTR-MCNC: 156 MG/DL (ref 70–99)
GLUCOSE BLDC GLUCOMTR-MCNC: 247 MG/DL (ref 70–99)
GLUCOSE BLDC GLUCOMTR-MCNC: 259 MG/DL (ref 70–99)
GLUCOSE BLDC GLUCOMTR-MCNC: 262 MG/DL (ref 70–99)
GLUCOSE BLDC GLUCOMTR-MCNC: 374 MG/DL (ref 70–99)
HCT VFR BLD AUTO: 18.6 % (ref 40–53)
HCT VFR BLD AUTO: 24.3 % (ref 40–53)
HGB BLD-MCNC: 6.4 G/DL (ref 13.3–17.7)
HGB BLD-MCNC: 8.3 G/DL (ref 13.3–17.7)
HGB BLD-MCNC: 8.5 G/DL (ref 13.3–17.7)
ISSUE DATE AND TIME: NORMAL
LACTATE SERPL-SCNC: 0.8 MMOL/L (ref 0.7–2)
MAGNESIUM SERPL-MCNC: 1.6 MG/DL (ref 1.8–2.6)
MAGNESIUM SERPL-MCNC: 1.7 MG/DL (ref 1.8–2.6)
MCH RBC QN AUTO: 30.9 PG (ref 26.5–33)
MCH RBC QN AUTO: 30.9 PG (ref 26.5–33)
MCHC RBC AUTO-ENTMCNC: 34.2 G/DL (ref 31.5–36.5)
MCHC RBC AUTO-ENTMCNC: 34.4 G/DL (ref 31.5–36.5)
MCV RBC AUTO: 90 FL (ref 78–100)
MCV RBC AUTO: 90 FL (ref 78–100)
PLATELET # BLD AUTO: 75 10E3/UL (ref 150–450)
PLATELET # BLD AUTO: 89 10E3/UL (ref 150–450)
POTASSIUM BLD-SCNC: 3.6 MMOL/L (ref 3.5–5)
POTASSIUM BLD-SCNC: 3.9 MMOL/L (ref 3.5–5)
RBC # BLD AUTO: 2.07 10E6/UL (ref 4.4–5.9)
RBC # BLD AUTO: 2.69 10E6/UL (ref 4.4–5.9)
SODIUM SERPL-SCNC: 130 MMOL/L (ref 136–145)
SODIUM SERPL-SCNC: 132 MMOL/L (ref 136–145)
SPECIMEN EXPIRATION DATE: NORMAL
UNIT ABO/RH: NORMAL
UNIT NUMBER: NORMAL
UNIT STATUS: NORMAL
UNIT TYPE ISBT: 600
WBC # BLD AUTO: 14.2 10E3/UL (ref 4–11)
WBC # BLD AUTO: 15.6 10E3/UL (ref 4–11)

## 2022-06-01 PROCEDURE — C9113 INJ PANTOPRAZOLE SODIUM, VIA: HCPCS | Performed by: INTERNAL MEDICINE

## 2022-06-01 PROCEDURE — 83605 ASSAY OF LACTIC ACID: CPT | Performed by: INTERNAL MEDICINE

## 2022-06-01 PROCEDURE — 250N000013 HC RX MED GY IP 250 OP 250 PS 637: Performed by: HOSPITALIST

## 2022-06-01 PROCEDURE — 80048 BASIC METABOLIC PNL TOTAL CA: CPT | Performed by: HOSPITALIST

## 2022-06-01 PROCEDURE — 36415 COLL VENOUS BLD VENIPUNCTURE: CPT | Performed by: HOSPITALIST

## 2022-06-01 PROCEDURE — 83735 ASSAY OF MAGNESIUM: CPT | Performed by: INTERNAL MEDICINE

## 2022-06-01 PROCEDURE — 250N000013 HC RX MED GY IP 250 OP 250 PS 637: Performed by: INTERNAL MEDICINE

## 2022-06-01 PROCEDURE — 120N000001 HC R&B MED SURG/OB

## 2022-06-01 PROCEDURE — 99233 SBSQ HOSP IP/OBS HIGH 50: CPT | Performed by: INTERNAL MEDICINE

## 2022-06-01 PROCEDURE — 250N000011 HC RX IP 250 OP 636: Performed by: INTERNAL MEDICINE

## 2022-06-01 PROCEDURE — 85018 HEMOGLOBIN: CPT | Performed by: INTERNAL MEDICINE

## 2022-06-01 PROCEDURE — 85027 COMPLETE CBC AUTOMATED: CPT | Performed by: HOSPITALIST

## 2022-06-01 PROCEDURE — 36415 COLL VENOUS BLD VENIPUNCTURE: CPT | Performed by: INTERNAL MEDICINE

## 2022-06-01 PROCEDURE — 86923 COMPATIBILITY TEST ELECTRIC: CPT | Performed by: INTERNAL MEDICINE

## 2022-06-01 PROCEDURE — 83735 ASSAY OF MAGNESIUM: CPT | Performed by: HOSPITALIST

## 2022-06-01 PROCEDURE — P9016 RBC LEUKOCYTES REDUCED: HCPCS | Performed by: INTERNAL MEDICINE

## 2022-06-01 PROCEDURE — 86850 RBC ANTIBODY SCREEN: CPT | Performed by: INTERNAL MEDICINE

## 2022-06-01 PROCEDURE — 80048 BASIC METABOLIC PNL TOTAL CA: CPT | Performed by: INTERNAL MEDICINE

## 2022-06-01 PROCEDURE — 250N000013 HC RX MED GY IP 250 OP 250 PS 637: Performed by: STUDENT IN AN ORGANIZED HEALTH CARE EDUCATION/TRAINING PROGRAM

## 2022-06-01 RX ORDER — PANTOPRAZOLE SODIUM 20 MG/1
40 TABLET, DELAYED RELEASE ORAL
Status: DISCONTINUED | OUTPATIENT
Start: 2022-06-01 | End: 2022-06-07 | Stop reason: HOSPADM

## 2022-06-01 RX ADMIN — INSULIN ASPART 1 UNITS: 100 INJECTION, SOLUTION INTRAVENOUS; SUBCUTANEOUS at 17:16

## 2022-06-01 RX ADMIN — CEFAZOLIN SODIUM 2 G: 2 INJECTION, SOLUTION INTRAVENOUS at 00:53

## 2022-06-01 RX ADMIN — CEFAZOLIN SODIUM 2 G: 2 INJECTION, SOLUTION INTRAVENOUS at 15:22

## 2022-06-01 RX ADMIN — POTASSIUM & SODIUM PHOSPHATES POWDER PACK 280-160-250 MG 1 PACKET: 280-160-250 PACK at 13:43

## 2022-06-01 RX ADMIN — THERA TABS 1 TABLET: TAB at 08:05

## 2022-06-01 RX ADMIN — INSULIN ASPART 3 UNITS: 100 INJECTION, SOLUTION INTRAVENOUS; SUBCUTANEOUS at 12:14

## 2022-06-01 RX ADMIN — TRAZODONE HYDROCHLORIDE 50 MG: 50 TABLET ORAL at 00:53

## 2022-06-01 RX ADMIN — PANTOPRAZOLE SODIUM 40 MG: 20 TABLET, DELAYED RELEASE ORAL at 20:59

## 2022-06-01 RX ADMIN — CEFAZOLIN SODIUM 2 G: 2 INJECTION, SOLUTION INTRAVENOUS at 08:04

## 2022-06-01 RX ADMIN — CEFAZOLIN SODIUM 2 G: 2 INJECTION, SOLUTION INTRAVENOUS at 23:59

## 2022-06-01 RX ADMIN — FOLIC ACID 1 MG: 1 TABLET ORAL at 08:05

## 2022-06-01 RX ADMIN — GABAPENTIN 200 MG: 100 CAPSULE ORAL at 00:53

## 2022-06-01 RX ADMIN — PANTOPRAZOLE SODIUM 40 MG: 40 INJECTION, POWDER, FOR SOLUTION INTRAVENOUS at 08:05

## 2022-06-01 RX ADMIN — FUROSEMIDE 20 MG: 10 INJECTION, SOLUTION INTRAMUSCULAR; INTRAVENOUS at 15:22

## 2022-06-01 RX ADMIN — FUROSEMIDE 20 MG: 10 INJECTION, SOLUTION INTRAMUSCULAR; INTRAVENOUS at 08:05

## 2022-06-01 RX ADMIN — POTASSIUM & SODIUM PHOSPHATES POWDER PACK 280-160-250 MG 1 PACKET: 280-160-250 PACK at 08:07

## 2022-06-01 RX ADMIN — INSULIN ASPART 3 UNITS: 100 INJECTION, SOLUTION INTRAVENOUS; SUBCUTANEOUS at 08:04

## 2022-06-01 RX ADMIN — FUROSEMIDE 20 MG: 10 INJECTION, SOLUTION INTRAMUSCULAR; INTRAVENOUS at 00:53

## 2022-06-01 RX ADMIN — Medication 50 MG: at 08:36

## 2022-06-01 RX ADMIN — FLUOXETINE HYDROCHLORIDE 40 MG: 20 CAPSULE ORAL at 20:59

## 2022-06-01 RX ADMIN — FUROSEMIDE 20 MG: 10 INJECTION, SOLUTION INTRAMUSCULAR; INTRAVENOUS at 23:59

## 2022-06-01 RX ADMIN — LACTULOSE 20 G: 10 SOLUTION ORAL at 13:43

## 2022-06-01 RX ADMIN — LACTULOSE 20 G: 10 SOLUTION ORAL at 08:08

## 2022-06-01 ASSESSMENT — ACTIVITIES OF DAILY LIVING (ADL)
ADLS_ACUITY_SCORE: 24

## 2022-06-01 NOTE — PROVIDER NOTIFICATION
Txt paged Dr. Sterling 11:53 AM 06/01/22     Vitaly RN #[id number]  354: M.F.  1 unit PRBCs completed for morning Hgb of 6.4 .. When would you like to recheck? Thanks!     **ADDENDUM 1157 AM 06/01/22 - Discussed in-person; VORB for Hgb recheck @ 7065

## 2022-06-01 NOTE — PROGRESS NOTES
Care Management Follow Up    Length of Stay (days): 8    Expected Discharge Date: 06/02/2022     Concerns to be Addressed:         Patient plan of care discussed at interdisciplinary rounds: Yes    Anticipated Discharge Disposition: Home, Home Care, Home Infusion (home with mom and [facility] for IV abx and RN until 6/7, OP PT)  Disposition Comments: Anticipate D/C home with Mom and [facility] for IV abx and RN (teaching to be done with Mom) and OP PT. Mom to transport.    Anticipated Discharge Services:  ([facility] for IV abx and RN until 6/7, OP PT)    Anticipated Discharge DME:  (PICC line)    Patient/family educated on Medicare website which has current facility and service quality ratings:  (CM   reviewed HC agency for IV abx, given referral for OP PT)    Education Provided on the Discharge Plan:  AVS will be per bedside RN.    Patient/Family in Agreement with the Plan: yes    Referrals Placed by CM/SW: Homecare      Additional Information:  Chart reviewed. CM met with patient. Patient states he lives with his parents. Patient plans to do outpatient IV infusion at time of discharge. [facility] home infusion following and plans to do a teach at the hospital with patient and Mom. CM following.        Eileen Kim, RN

## 2022-06-01 NOTE — PLAN OF CARE
Goal Outcome Evaluation:    Plan of Care Reviewed With: patient, mother     Overall Patient Progress: improving    Alert and oriented x 4, able to make needs known, VSS except tachycardic, Unit draw changed to lab draw, PICC flushable, but minimal blood return, On phosphorus and magnesium protocol, phosphorus 2.2, phosphorus replaced per protocol, magnesium 1.7, to be rechecked on 6/2, no pressure sore noted on sacrum, refused scheduled lactulose, reported 4 BM today, PRN gabapentin given x 1 for feet pain with good relief, PRN trazodone x1 given, resting comfortably, BLE edematous, elevated with pillows, AM Hgb 6.4, Notified provider, received orders, AM nurse will transfuse blood.

## 2022-06-01 NOTE — TELEPHONE ENCOUNTER
Staff messages to scheduling to reschedule procedure.         Blank Noel, RN  P Endoscopy Scheduling Pool; P Endoscopy Nurse Pool  Endo scheduling,     Please see Dr. Jackson's message below             Previous Messages       ----- Message -----   From: Gavino Myers MD   Sent: 6/1/2022  10:43 AM CDT   To: Ludivina Shirley RN, Endoscopy Nurse Pool   Subject: RE: EGD too soon                                 Even 4 weeks is too soon.  On review of the case, he should be scheduled 6 weeks after the previous procedure.       ----- Message -----   From: Ludivina Shirley RN   Sent: 6/1/2022  10:35 AM CDT   To: Gavino Peoples MD, *   Subject: EGD too soon                                     Hello,     This Pt is scheduled with you on 6/9/22 at the [facility] for a repeat EGD. Per last EGD on 5/23/22 while admitted for coffee ground emesis he was advised to repeat EGD in 3-4 weeks. He is about 4 days shy of 3 weeks.     Ok to keep procedure date of 6/9/22 or would you like us to push this procedure out to recommended time frame?     Thank you.     Ludivina Shirley RN   -[facility] Endoscopy

## 2022-06-01 NOTE — PROGRESS NOTES
[facility] Medicine PROGRESS NOTE      Identification/Summary:   Dillon Salter is a 26-year-old male with history of Autism/Asperger, DM2, anxiety, alcohol abuse x 8.5 years complicated by cirrhosis with portal hypertension and recent hospitalization 04/06-04/12  for aspiration pneumonia, in the setting of alcohol intoxication and alcoholic hepatitis. Admitted to [facility] on May 23 due to concerns of severe anemia secondary to hematemesis, encephalopathy and general body weakness.    His evaluation here has been quite complicated with acute on chronic anemia due to blood loss and hemolytic anemia, staph aureus bacteremia, staph and E. coli in urine.  Being treated with frequent blood transfusions, platelet transfusions as per hematology.  Followed by gastroenterology, infectious disease, and hematology. Titrating lactulose to goal of 4 BMs a day. Hgb 6.3 this AM; 1 U pRBC transfused with repeat Hgb still only 6.7; a second U pRBC ordered.       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  Presented with hemoglobin of 5.3, in the setting of upper GI bleeding/hematemesis  -Got 2 units of packed red blood cells in ED. despite this hemoglobin dropped again  -Currently no evidence of GI blood loss, acute drop due to hemolysis  -Transfused another 2 units on the evening of 5/26 as well as 2 units of  platelets  -Appreciate hematology consult  -Transfusing 2nd U pRBC now 5/29 given Hgb of 6.7 after 1st U pRBC. Repeat Hgb ordered.      Alcoholic liver disease, severe  History of alcohol abuse with alcoholic hepatitis, sober for greater than 1 month  Portal hypertension on imaging  Coagulopathy  Thrombocytopenia  Encephalopathy  Elevated ammonia  Severe malnutrition  -EGD completed May 24 with large esophageal varices, banded  -Advance to soft diet  -Continue IV PPI twice daily  -Got platelets transfusion 05/24; transfuse for bleeding and plts< 50,000   -Holding home furosemide, spironolactone  -Abdominal ultrasound with duplex, cirrhotic liver morphology, no PVT, no ascites, no cholecystitis  -Trend CBC LFTs and INR daily  -Outpatient follow-up with hepatology  -Continued increased lactulose to be more frequent with hold parameters after 4 moderate/large BMs; responding better with improved mental status with this  -Outpatient follow up at [facility] in August as scheduled  -Repeat EGD with possible banding at [facility] in 3-4 weeks     MSSA bacteremia  UTI with staph aureus and E. coli  -appreciate infectious disease consult, on cefazolin  -Now has midline    Moderate hyponatremia, acute on chronic, baseline sodium likely around 130  -Likely in the setting of chronic liver disease   -Follow clinically  -Check Na if clinically indicated    Hyperkalemia  -Potassium on admission 5.4.   Subsequently up to 6.5   -Hyperkalemia protocol, close monitoring  -Discontinued spironolactone  -Keep on telemetry  -Continue to monitor     Hyperglycemia  Diabetes mellitus.   -At home on Lantus 50 units at night, and aspart 5 units 3 times daily  -Quite hypoglycemic this morning, started fluids with dextrose  -Decrease Lantus to 20 units in the morning  -Continue carb counts and sliding scale with meals  -Discontinue IV fluids with dextrose if he becomes hyperglycemic     History of hypertension and dyslipidemia  -We will monitor here and  resume blood pressure meds as indicated  -Possibly start nadolol     Autism/Asperger's  -Very pleasant and interactive, high functioning            Physical Exam/Objective:  Gen: Thin, no acute distress but appears thin, frail, and ill  ENT: Notable prominent scleral icterus b/l  Pulm: lungs are clear without significant bibasilar crackles, no wheezing, breathing comfortably at rest  CV: regular rate and rhythm, trace lower extremity edema  GI: abdomen is soft, no tenderness to palpation, distended, no rigidity   Derm: Jaundiced, scattered bruises  Psych: Appropriate affect  Skin: jaundiced throughout     Medications:     ceFAZolin  2 g Intravenous Q8H     FLUoxetine  40 mg Oral At Bedtime     folic acid  1 mg Oral Daily     furosemide  20 mg Intravenous Q8H     [Held by provider] furosemide  40 mg Oral Daily     insulin aspart   Subcutaneous Daily with breakfast     insulin aspart   Subcutaneous Daily with lunch     insulin aspart   Subcutaneous Daily with supper     insulin aspart  1-7 Units Subcutaneous TID AC     insulin aspart  1-5 Units Subcutaneous At Bedtime     insulin glargine  20 Units Subcutaneous QAM     lactulose  20 g Oral 4x Daily     multivitamin, therapeutic  1 tablet Oral Daily     pantoprazole (PROTONIX) IV  40 mg Intravenous BID     sodium chloride (PF)  10 mL Intracatheter Q8H     sodium chloride (PF)  3 mL Intracatheter Q8H     thiamine  50 mg Oral Daily

## 2022-06-01 NOTE — PROGRESS NOTES
GI PROGRESS NOTE  6/1/2022  Dillon Salter  1995  /-13    Subjective:  He feels well without complaints.  He's having brown stools, no vomiting.  He did get another unit of blood today for hgb 6.4.     Objective:    Patient Vitals for the past 24 hrs:   BP Temp Temp src Pulse Resp SpO2 Weight   06/01/22 1050 135/67 98  F (36.7  C) Oral 100 16 99 % --   06/01/22 0950 139/72 98.3  F (36.8  C) Oral 106 16 97 % --   06/01/22 0854 (!) 144/74 97.7  F (36.5  C) Oral 109 14 99 % --   06/01/22 0830 136/77 97.9  F (36.6  C) Oral 106 16 99 % --   06/01/22 0800 130/74 98.2  F (36.8  C) Oral 110 16 97 % --   06/01/22 0420 124/60 98.3  F (36.8  C) Oral 113 18 94 % 67.3 kg (148 lb 6.4 oz)   05/31/22 2329 130/61 97.1  F (36.2  C) Oral 107 20 98 % --   05/31/22 2030 120/57 97  F (36.1  C) Oral 102 20 98 % --   05/31/22 1645 120/62 98.4  F (36.9  C) Oral 109 16 98 % --     Body mass index is 23.95 kg/m .  Gen: NAD  GI: Non-distended, BS positive, soft, non-tender    Laboratory  Recent Labs   Lab Test 06/01/22  0602 05/31/22  1750 05/31/22  0625 05/28/22  1808 05/28/22  0506 05/27/22  1206 05/27/22  0638   WBC 14.2* 15.3* 13.8*   < > 11.4*   < > 12.1*   HGB 6.4* 7.2* 8.1*   < > 7.1*   < > 6.9*   MCV 90 93 90   < > 92   < > 93   PLT 75* 76* 71*   < > 47*   < > 73*   INR  --   --  1.77*  --  2.23*  --  2.14*    < > = values in this interval not displayed.     Recent Labs   Lab Test 06/01/22  0759 06/01/22  0602 06/01/22  0145 05/31/22  0752 05/31/22  0625 05/29/22  0817 05/29/22  0438 05/28/22  0953 05/28/22  0506   NA  --  130*  --   --  131*  --   --   --  132*   POTASSIUM  --  3.6  --   --  3.7  --   --   --  4.4   CHLORIDE  --  93*  --   --  94*  --   --   --  97*   CO2  --  31  --   --  29  --   --   --  29   BUN  --  19  --   --  18  --   --   --  21   CR  --  0.57*  --   --  0.60*  --  0.64*  --  0.64*   ANIONGAP  --  6  --   --  8  --   --   --  6   SARTHAK  --  7.6*  --   --  8.2*  --   --   --  8.4*   * 315*  374*   < > 259*   < >  --    < > 227*    < > = values in this interval not displayed.     Recent Labs   Lab Test 05/31/22  0625 05/28/22  0506 05/27/22  0638 05/24/22  0619 05/23/22  2300 04/07/22  0624 04/06/22  2307   ALBUMIN 2.8* 2.4* 2.5*   < >  --    < > 3.7   BILITOTAL 13.7* 17.7* 17.4*   < >  --    < > 11.7*   ALT 32 36 42   < >  --    < > 131*   AST 97* 91* 98*   < >  --    < > 237*   ALKPHOS 281* 99 100   < >  --    < > 158*   PROTEIN  --   --   --   --  Negative  --   --    LIPASE  --   --   --   --   --   --  45    < > = values in this interval not displayed.       US abd hepatic/portal vasculature 5/24/22:  FINDINGS:   PANCREAS: Normal where visualized, without focal mass identified. No pancreatic ductal dilatation is identified.     LIVER: The liver is coarsened in echogenicity with a peripheral nodular contour. Gallbladder sludge is noted biliary ductal dilatation.     OTHER: Small volume free fluid adjacent to the gallbladder.     Velocity (centimeters per second):  Main portal vein: 38  Left portal vein: 20  Right portal vein: 53  Left hepatic vein: 30  Middle hepatic vein: 19  Right hepatic vein: 29  Hepatic artery: 244  Splenic vein at pancreas: 21     Portal vein diameter: 1.2 cm.                                                                  IMPRESSION:   1. Cirrhotic liver morphology.  2. Patent hepatic vessels with appropriately directed flow.  3. Gallbladder sludge without sonographic evidence of cholecystitis.     Procedure results:  EGD 5/24/22 ([facility]):  Findings:        Grade II varices were found in the lower third of the esophagus from        30-39 cm. They were 9 mm in largest diameter. There was old blood in the        esophagus. Three bands were successfully placed with complete        eradication, resulting in deflation of varices. The bands were placed at        38 cm, 35 cm and 33 cm along one prominent varix. There was no bleeding        during and at the end of the procedure.         The Z-line was regular and was found 39 cm from the incisors.        Hematin (altered blood/coffee-ground-like material) and old red blood        were found in the gastric body and fundus. No active bleeding was        observed.       The duodenal bulb, first portion of the duodenum and second portion of        the duodenum were normal. There was no blood in the duodenum.     Impression:  - Grade II esophageal varices. Completely eradicated.                          Banded.                          - Z-line regular, 39 cm from the incisors.                          - Hematin (altered blood/coffee-ground-like material)                          in the gastric body.                          - Normal duodenal bulb, first portion of the duodenum                          and second portion of the duodenum.                          - No specimens collected.   Recommendation:        - [facility]                          - Clear liquid diet today.                          - Continue present medications-Ceftriaxone, Octreotide                          and IV Protonix                          - Consider Lactulose 30 cc PO qday to aim for 3-4                          BM's/day                          - Repeat EGD with General in 3-4 weeks-ordered thru                          [facility]      Assessment:  1. Alcoholic cirrhosis with ascites, admitted with upper GI bleed related to varices S/P EGD with banding 5/24.    2.  Anemia- There is no overt GI bleeding now but his hgb dropped.  Hematology consulted last week re: low plts, hemolysis etc and they recommend support with transfusions and B12/iron/folate.  He'll need labs followed as an outpatient.  3.  Mild encephalopathy, on lactulose with dose increased 5/28.  Having brown stools without clinical confusion today.  4.  Staph aureus bacteremia and urine cx positive for staph aureus and e coli, on antibiotics.    Patient Active Problem  List   Diagnosis     Bleeding gums     Alcoholic cirrhosis of liver with ascites (H)     Anemia due to blood loss, acute     Diabetes mellitus type 2 in obese (H)     Alcohol intoxication (H)     Open abdominal wall wound     Asperger's disorder     Benign essential hypertension     Anemia of chronic illness     Alcohol withdrawal, uncomplicated (H)     Alcoholic hepatitis     Anxiety and depression     Balanitis     Encephalopathy     Alcoholic cirrhosis of liver without ascites (H)     Anemia, unspecified type     Hematemesis, presence of nausea not specified        Plan:    1. Continue current cares.  2.  He needs f/u EGD and Hepatology f/u.  Both are currently scheduled with the [facility].  EGD was rescheduled for 7/7 and f/u is early August.  3.  Continue antibiotics.  4.  Nothing further now per GI and we'll sign off.    Deysi Jauregui PA-C  [facility]  [phone]

## 2022-06-01 NOTE — TELEPHONE ENCOUNTER
Outbound Call made to: Dillon Salter      Procedure: egd     Date, Location, and Surgeon of Procedure Cancelled:   06/09/2022 - [facility]       Reason for call (please be detailed, any staff messages or encounters to note?):     From: Blank Noel, RN     To: Endoscopy Nurse Pool, *   Subject: 6.9.2022 r/s                                     Endo scheudling,     Please r/s pt.  Pt has a MELD score of 28 which is too high for ASC.     Thank you,   Blank Noel RN   Pre-Assessment Endoscopy           Rescheduled:     NO -- LVMTCB        (TENTIVLY  -- PLUGGED PT IN ON SAME DAY 06/09 @ [facility] w/[facility] @ 945AM)      **PT WILL NEED TO CONFIRM IF LOCATION AND TIME CHANGE WILL WORK FOR HIM THAT DAY.  **PT WILL NEED COVID TEST   **PT WILL NEED PAC EVAL     [facility]

## 2022-06-01 NOTE — TELEPHONE ENCOUNTER
Per chart review the patient is currently admitted due to having coffee ground emesis on 5/23/22.  EGD was performed on 5/23/22:    Impression:  Large esophageal varix from 30-39 cm.  Blood tinged material in esophagus and proximal stomach.  3 bands placed on the varix in this distribution. Repeat EGD and possible banding in 3-4 weeks.       The Pt is scheduled too early, 3 weeks would be 5/13/22. Will send staff message to the provider the Pt is scheduled with to see if this procedure date of 6/9/22 is ok. (Dr. Jackson)    Patient scheduled for EGD on 6/9/22.     Covid test scheduled: None scheduled.     Arrival time: 0845    Facility location: [facility]    Sedation type: CS -- Per scheduling intake call, the Pt is not currently drinking.     Indication for procedure: ETOH hepatitis r/o varices    Anticoagulations? None     Prep instructions sent via: Will hold off until we speak with provider about EGD being early.     Pre visit planning completed.    Ludivina Shirley RN

## 2022-06-01 NOTE — TELEPHONE ENCOUNTER
Caller: Dillon Salter    Procedure: Colon    Date, Location, and Surgeon of Procedure Cancelled: 67/7, [facility], [facility]    Ordering Provider: Sergio Wood MD     Reason for cancel (please be detailed, any staff messages or encounters to note?):   ----- Message -----  From: Ludivina Shirley RN  Sent: 6/1/2022  11:17 AM CDT  To: Ludivina Shirley RN, *  Subject: FW: EGD too soon                                 Hello,     Please see below. The Pt should have his EGD rescheduled for 6 weeks out from 5/23/22. Earliest he can have this done is 7/4/22. Thank you.     Ludivina Shirley RN   [facility] Endoscopy       ----- Message -----  From: Gavino Myers MD  Sent: 6/1/2022  10:43 AM CDT  To: Ludivina Shirley RN, Endoscopy Nurse Pool  Subject: RE: EGD too soon                                 Even 4 weeks is too soon.  On review of the case, he should be scheduled 6 weeks after the previous procedure.      ----- Message -----  From: Ludivina Shirley RN  Sent: 6/1/2022  10:35 AM CDT  To: Gavino Peoples MD, *  Subject: EGD too soon                                     Hello,    This Pt is scheduled with you on 6/9/22 at the [facility] for a repeat EGD. Per last EGD on 5/23/22 while admitted for coffee ground emesis he was advised to repeat EGD in 3-4 weeks. He is about 4 days shy of 3 weeks.     Ok to keep procedure date of 6/9/22 or would you like us to push this procedure out to recommended time frame?    Thank you.     Ludivina Shirley RN   [facility] Endoscopy     Rescheduled: Y     If rescheduled:    Date: 7/21   Location: [facility]    Prep Resent: Yes (changes to prep?)   Covid Test Rescheduled: No, done externally   Note any change or update to original order/sedation: Scheduling Pre-Op at PCP.

## 2022-06-01 NOTE — PLAN OF CARE
Problem: Anemia  Goal: Anemia Symptom Improvement  Outcome: Ongoing, Progressing  Hemoglobin 8.5 (previously 6.4) after 1 unit PRBC transfusion.     Problem: Electrolyte Imbalance  Goal: Electrolyte Balance  Outcome: Ongoing, Progressing  Phosphorus replaced with oral supplement - lab redraw (6/2) morning. Magnesium redraw (6/2) morning

## 2022-06-01 NOTE — TELEPHONE ENCOUNTER
"Talked to mom (Leticia) as she returned my initial call from 3 weeks ago introducing self and asking for call back to discuss \"the plan\" for addressing use of alcohol by Dillon and [facility] policy regarding obtaining an ROBERT Assessment and recommendation if applicable as part of Liver Transplant screening. Mom had questions about how to take into account Dillon's needs specifically regarding Asberger Syndrome. I indicated that if she were to contact [facility] (thought she did not have to use [facility]) for and ROBERT Assessment Appointment, she could make sure his needs were met to take that into account, for both the Assessment and any subsequent recommendations based on his physical, psycological and social reqiuirements.     I acknowledged that as of the date of this note, Dillon is in [facility] (see EMR), so setting the ROBERT Assessment appointment for after his discharge would be OK, but sooner rather than later. I also asked that she let me know when that appointment was set and mention my name to the ROBERT Assessment schedulers/assessors so if necessary they could reach out for my input (if needed) as well. Number given to mom to set up ROBERT Assessment appointment; [phone].     Mom indicated she would ask for schedulers to make note that it would helpful to Dillon if she was allowed to be part of the Assessment appointment. I said I did not believe that would be a problem. Again - confirmed she would contact me if there were any questions or issues getting the appointment set.    Ashkan Singleton, MARC  "

## 2022-06-02 ENCOUNTER — APPOINTMENT (OUTPATIENT)
Dept: PHYSICAL THERAPY | Facility: CLINIC | Age: 27
DRG: 432 | End: 2022-06-02
Payer: COMMERCIAL

## 2022-06-02 LAB
ACANTHOCYTES BLD QL SMEAR: SLIGHT
ALBUMIN PERCENT: 40.3 % (ref 51–67)
ALBUMIN SERPL ELPH-MCNC: 2.9 G/DL (ref 3.2–4.7)
ALBUMIN SERPL-MCNC: 2.7 G/DL (ref 3.5–5)
ALP SERPL-CCNC: 247 U/L (ref 45–120)
ALPHA 1 PERCENT: 2.5 % (ref 2–4)
ALPHA 2 PERCENT: 8.6 % (ref 5–13)
ALPHA1 GLOB SERPL ELPH-MCNC: 0.2 G/DL (ref 0.1–0.3)
ALPHA2 GLOB SERPL ELPH-MCNC: 0.6 G/DL (ref 0.4–0.9)
ALT SERPL W P-5'-P-CCNC: 29 U/L (ref 0–45)
AMMONIA PLAS-SCNC: 41 UMOL/L (ref 11–35)
ANION GAP SERPL CALCULATED.3IONS-SCNC: 6 MMOL/L (ref 5–18)
AST SERPL W P-5'-P-CCNC: 96 U/L (ref 0–40)
B-GLOBULIN SERPL ELPH-MCNC: 1.1 G/DL (ref 0.7–1.2)
BACTERIA BLD CULT: NO GROWTH
BASOPHILS # BLD MANUAL: 0 10E3/UL (ref 0–0.2)
BASOPHILS NFR BLD MANUAL: 0 %
BETA PERCENT: 15.2 % (ref 10–17)
BILIRUB DIRECT SERPL-MCNC: 3.9 MG/DL
BILIRUB SERPL-MCNC: 13.9 MG/DL (ref 0–1)
BUN SERPL-MCNC: 19 MG/DL (ref 8–22)
CALCIUM SERPL-MCNC: 8.2 MG/DL (ref 8.5–10.5)
CHLORIDE BLD-SCNC: 91 MMOL/L (ref 98–107)
CO2 SERPL-SCNC: 34 MMOL/L (ref 22–31)
CREAT SERPL-MCNC: 0.56 MG/DL (ref 0.7–1.3)
EOSINOPHIL # BLD MANUAL: 0.2 10E3/UL (ref 0–0.7)
EOSINOPHIL NFR BLD MANUAL: 1 %
ERYTHROCYTE [DISTWIDTH] IN BLOOD BY AUTOMATED COUNT: 21.9 % (ref 10–15)
GAMMA GLOB SERPL ELPH-MCNC: 2.4 G/DL (ref 0.6–1.4)
GAMMA GLOBULIN PERCENT: 33.4 % (ref 9–20)
GFR SERPL CREATININE-BSD FRML MDRD: >90 ML/MIN/1.73M2
GLUCOSE BLD-MCNC: 307 MG/DL (ref 70–125)
GLUCOSE BLDC GLUCOMTR-MCNC: 124 MG/DL (ref 70–99)
GLUCOSE BLDC GLUCOMTR-MCNC: 224 MG/DL (ref 70–99)
GLUCOSE BLDC GLUCOMTR-MCNC: 229 MG/DL (ref 70–99)
GLUCOSE BLDC GLUCOMTR-MCNC: 369 MG/DL (ref 70–99)
GLUCOSE BLDC GLUCOMTR-MCNC: 74 MG/DL (ref 70–99)
HCT VFR BLD AUTO: 23.4 % (ref 40–53)
HGB BLD-MCNC: 8.1 G/DL (ref 13.3–17.7)
INR PPP: 1.79 (ref 0.85–1.15)
LYMPHOCYTES # BLD MANUAL: 2.9 10E3/UL (ref 0.8–5.3)
LYMPHOCYTES NFR BLD MANUAL: 19 %
MAGNESIUM SERPL-MCNC: 1.7 MG/DL (ref 1.8–2.6)
MCH RBC QN AUTO: 30.6 PG (ref 26.5–33)
MCHC RBC AUTO-ENTMCNC: 34.6 G/DL (ref 31.5–36.5)
MCV RBC AUTO: 88 FL (ref 78–100)
METAMYELOCYTES # BLD MANUAL: 0.3 10E3/UL
METAMYELOCYTES NFR BLD MANUAL: 2 %
MONOCYTES # BLD MANUAL: 1.1 10E3/UL (ref 0–1.3)
MONOCYTES NFR BLD MANUAL: 7 %
MYELOCYTES # BLD MANUAL: 0.2 10E3/UL
MYELOCYTES NFR BLD MANUAL: 1 %
NEUTROPHILS # BLD MANUAL: 10.5 10E3/UL (ref 1.6–8.3)
NEUTROPHILS NFR BLD MANUAL: 70 %
PATH ICD:: ABNORMAL
PATH ICD:: NORMAL
PHOSPHATE SERPL-MCNC: 2.1 MG/DL (ref 2.5–4.5)
PLAT MORPH BLD: ABNORMAL
PLATELET # BLD AUTO: 94 10E3/UL (ref 150–450)
POTASSIUM BLD-SCNC: 3.7 MMOL/L (ref 3.5–5)
PROT PATTERN SERPL ELPH-IMP: ABNORMAL
PROT PATTERN SERPL IFE-IMP: NORMAL
PROT SERPL-MCNC: 7.3 G/DL (ref 6–8)
RBC # BLD AUTO: 2.65 10E6/UL (ref 4.4–5.9)
RBC MORPH BLD: ABNORMAL
REVIEWING PATHOLOGIST: ABNORMAL
REVIEWING PATHOLOGIST: NORMAL
SARS-COV-2 RNA RESP QL NAA+PROBE: NEGATIVE
SODIUM SERPL-SCNC: 131 MMOL/L (ref 136–145)
TOTAL PROTEIN SERUM FOR ELP (SYNCED VALUE): 7.2 G/DL
WBC # BLD AUTO: 15 10E3/UL (ref 4–11)

## 2022-06-02 PROCEDURE — 36415 COLL VENOUS BLD VENIPUNCTURE: CPT | Performed by: HOSPITALIST

## 2022-06-02 PROCEDURE — 97116 GAIT TRAINING THERAPY: CPT | Mod: GP

## 2022-06-02 PROCEDURE — 83735 ASSAY OF MAGNESIUM: CPT | Performed by: INTERNAL MEDICINE

## 2022-06-02 PROCEDURE — 250N000013 HC RX MED GY IP 250 OP 250 PS 637: Performed by: INTERNAL MEDICINE

## 2022-06-02 PROCEDURE — 85027 COMPLETE CBC AUTOMATED: CPT | Performed by: INTERNAL MEDICINE

## 2022-06-02 PROCEDURE — 99233 SBSQ HOSP IP/OBS HIGH 50: CPT | Performed by: INTERNAL MEDICINE

## 2022-06-02 PROCEDURE — U0003 INFECTIOUS AGENT DETECTION BY NUCLEIC ACID (DNA OR RNA); SEVERE ACUTE RESPIRATORY SYNDROME CORONAVIRUS 2 (SARS-COV-2) (CORONAVIRUS DISEASE [COVID-19]), AMPLIFIED PROBE TECHNIQUE, MAKING USE OF HIGH THROUGHPUT TECHNOLOGIES AS DESCRIBED BY CMS-2020-01-R: HCPCS | Performed by: FAMILY MEDICINE

## 2022-06-02 PROCEDURE — 120N000001 HC R&B MED SURG/OB

## 2022-06-02 PROCEDURE — 82310 ASSAY OF CALCIUM: CPT | Performed by: INTERNAL MEDICINE

## 2022-06-02 PROCEDURE — 82248 BILIRUBIN DIRECT: CPT | Performed by: INTERNAL MEDICINE

## 2022-06-02 PROCEDURE — 85610 PROTHROMBIN TIME: CPT | Performed by: HOSPITALIST

## 2022-06-02 PROCEDURE — 82140 ASSAY OF AMMONIA: CPT | Performed by: HOSPITALIST

## 2022-06-02 PROCEDURE — 80069 RENAL FUNCTION PANEL: CPT | Performed by: INTERNAL MEDICINE

## 2022-06-02 PROCEDURE — 250N000011 HC RX IP 250 OP 636: Performed by: INTERNAL MEDICINE

## 2022-06-02 PROCEDURE — 85007 BL SMEAR W/DIFF WBC COUNT: CPT | Performed by: INTERNAL MEDICINE

## 2022-06-02 PROCEDURE — 250N000013 HC RX MED GY IP 250 OP 250 PS 637: Performed by: HOSPITALIST

## 2022-06-02 PROCEDURE — 36415 COLL VENOUS BLD VENIPUNCTURE: CPT | Performed by: INTERNAL MEDICINE

## 2022-06-02 PROCEDURE — 250N000013 HC RX MED GY IP 250 OP 250 PS 637: Performed by: STUDENT IN AN ORGANIZED HEALTH CARE EDUCATION/TRAINING PROGRAM

## 2022-06-02 RX ORDER — FUROSEMIDE 10 MG/ML
40 INJECTION INTRAMUSCULAR; INTRAVENOUS EVERY 8 HOURS
Status: DISCONTINUED | OUTPATIENT
Start: 2022-06-03 | End: 2022-06-05 | Stop reason: DRUGHIGH

## 2022-06-02 RX ORDER — SPIRONOLACTONE 25 MG/1
25 TABLET ORAL
Status: DISCONTINUED | OUTPATIENT
Start: 2022-06-02 | End: 2022-06-07 | Stop reason: HOSPADM

## 2022-06-02 RX ADMIN — SPIRONOLACTONE 25 MG: 25 TABLET, FILM COATED ORAL at 19:10

## 2022-06-02 RX ADMIN — POTASSIUM & SODIUM PHOSPHATES POWDER PACK 280-160-250 MG 1 PACKET: 280-160-250 PACK at 09:12

## 2022-06-02 RX ADMIN — CEFAZOLIN SODIUM 2 G: 2 INJECTION, SOLUTION INTRAVENOUS at 17:14

## 2022-06-02 RX ADMIN — LACTULOSE 20 G: 10 SOLUTION ORAL at 17:12

## 2022-06-02 RX ADMIN — POTASSIUM & SODIUM PHOSPHATES POWDER PACK 280-160-250 MG 1 PACKET: 280-160-250 PACK at 21:13

## 2022-06-02 RX ADMIN — Medication 50 MG: at 09:07

## 2022-06-02 RX ADMIN — PANTOPRAZOLE SODIUM 40 MG: 20 TABLET, DELAYED RELEASE ORAL at 09:07

## 2022-06-02 RX ADMIN — THERA TABS 1 TABLET: TAB at 09:07

## 2022-06-02 RX ADMIN — INSULIN ASPART 2 UNITS: 100 INJECTION, SOLUTION INTRAVENOUS; SUBCUTANEOUS at 09:08

## 2022-06-02 RX ADMIN — FOLIC ACID 1 MG: 1 TABLET ORAL at 09:07

## 2022-06-02 RX ADMIN — LACTULOSE 20 G: 10 SOLUTION ORAL at 13:08

## 2022-06-02 RX ADMIN — FUROSEMIDE 20 MG: 10 INJECTION, SOLUTION INTRAMUSCULAR; INTRAVENOUS at 17:14

## 2022-06-02 RX ADMIN — FUROSEMIDE 40 MG: 10 INJECTION, SOLUTION INTRAMUSCULAR; INTRAVENOUS at 23:54

## 2022-06-02 RX ADMIN — CEFAZOLIN SODIUM 2 G: 2 INJECTION, SOLUTION INTRAVENOUS at 23:54

## 2022-06-02 RX ADMIN — FLUOXETINE HYDROCHLORIDE 40 MG: 20 CAPSULE ORAL at 21:13

## 2022-06-02 RX ADMIN — CEFAZOLIN SODIUM 2 G: 2 INJECTION, SOLUTION INTRAVENOUS at 09:13

## 2022-06-02 RX ADMIN — LACTULOSE 20 G: 10 SOLUTION ORAL at 21:14

## 2022-06-02 RX ADMIN — POTASSIUM & SODIUM PHOSPHATES POWDER PACK 280-160-250 MG 1 PACKET: 280-160-250 PACK at 13:08

## 2022-06-02 RX ADMIN — FUROSEMIDE 20 MG: 10 INJECTION, SOLUTION INTRAMUSCULAR; INTRAVENOUS at 09:07

## 2022-06-02 RX ADMIN — LACTULOSE 20 G: 10 SOLUTION ORAL at 09:06

## 2022-06-02 RX ADMIN — PANTOPRAZOLE SODIUM 40 MG: 20 TABLET, DELAYED RELEASE ORAL at 17:14

## 2022-06-02 ASSESSMENT — ACTIVITIES OF DAILY LIVING (ADL)
ADLS_ACUITY_SCORE: 24

## 2022-06-02 NOTE — PLAN OF CARE
Alert and oriented x4. Able to make needs known via call light. Independent in room. PICC line patent, double lumen. No complaints of pain. VSS. No complaints of chest pain, sob, dizziness, lightheaded.     Tele: Sinus rhythmn to Sinus Tach - HR from low 90s to low 100s. Last blood sugar 364.   Problem: Plan of Care - These are the overarching goals to be used throughout the patient stay.    Goal: Optimal Comfort and Wellbeing  6/2/2022 0752 by Harish Lucas RN  Outcome: Ongoing, Progressing  6/2/2022 0235 by Harish Lucas RN  Outcome: Ongoing, Progressing  Intervention: Monitor Pain and Promote Comfort  Recent Flowsheet Documentation  Taken 6/2/2022 0400 by Harish Lucas RN  Pain Management Interventions: declines  Taken 6/2/2022 0000 by Harish Lucas RN  Pain Management Interventions: declines     Problem: Diabetes Comorbidity  Goal: Blood Glucose Level Within Targeted Range  Outcome: Ongoing, Not Progressing

## 2022-06-02 NOTE — PLAN OF CARE
Physical Therapy Discharge Summary    Reason for therapy discharge:    All goals and outcomes met, no further needs identified.  Patient/family request discontinuation of services. Pt is independent with ambulation and transfers. Pt has no PT concerns at this time. Pt reports to discharge PT at this time. Pt informed that if he feels like he needs more PT to inform nursing to have a new order placed.    Progress towards therapy goal(s). See goals on Care Plan in [facility] electronic health record for goal details.  Goals met    Therapy recommendation(s):    Continued therapy is recommended.  Rationale/Recommendations:  continued PT after discharge with outpatient PT.  Continue home exercise program.

## 2022-06-02 NOTE — PROGRESS NOTES
[facility] MEDICINE PROGRESS NOTE      Identification/Summary: Dillon Salter is a 26 year old male with a past medical history of of Autism/Asperger, DM2, anxiety, alcohol abuse x 8.5 years complicated by cirrhosis with portal hypertension and recent hospitalization 04/06-04/12  for aspiration pneumonia, in the setting of alcohol intoxication and alcoholic hepatitis who was admitted on 5/23/2022 for severe anemia in the setting of hematemesis encephalopathy and weakness. Hospital course is notable for finding of multiple electrolyte abnormalities, staff aureus bacteremia staph and E. coli UTI pancytopenia requiring platelet and blood transfusions.     Assessment and Plan:   1/.  Acute blood loss anemia: Status posttransfusion x3 units.  Bleeding due to upper GI bleed  2/.  Acute upper GI bleed secondary to varices status post EGD and banding on May 24.  Continue PPI twice daily indefinitely  3/.  Staph bacteremia and urine culture growth appreciate ID input, antibiotics to be given IV till 7 June.  4/.  Hypokalemia, hypomagnesemia and hypophosphatemia secondary to liver disease replace as per protocol.  5/.  Alcoholic hepatitis bilirubin seems to have peaked at this point in time steroids are not indicated at this time because of GI bleed issues  6/.  Pancytopenia predominantly thrombocytopenia and anemia: Former related to liver disease and second 1 related to GI bleed  7/.  Leukocytosis persistent at the situation no change despite being on antibiotics, no recurrence of fever etc.  8/.  Lower extremity edema secondary to alcoholic hepatitis and alcoholic liver disease.  Increase furosemide IV to 40 mg every 8 hours add Aldactone to the regime    Diet: Snacks/Supplements Adult: Glucerna; With Meals  Combination Diet Moderate Consistent Carb (60 g CHO per Meal) Diet; 2 gm NA Diet  DVT Prophylaxis: Avoided due to bleeding issues  Code Status: Full Code    Anticipated possible discharge  in 1 days to home once GI related and electrolytes milestones are met.  Disposition Plan   Expected Discharge: 06/03/2022     Anticipated discharge location: home with family;home with help/services (home with [facility] for IV abx and RN until 6/7, OP PT)    Delays:    Electrolyte replacement       The patient's care was discussed with the Bedside Nurse, Care Coordinator/ and Patient.    Mahi Condon MD  [facility]  Phone: #[phone]    Interval History/Subjective:  No acute or new issues overnight apparently there was some cardiac rhythm abnormality for a brief period of time however I could not find the rhythm strip to evaluate that.  Patient was apparently asleep when this incident happened.  Electrolytes continue to be off and are being replaced.  Bilirubin is elevated but seems to have peaked    Physical Exam/Objective:  Temp:  [97.2  F (36.2  C)-98.3  F (36.8  C)] 98.1  F (36.7  C)  Pulse:  [] 99  Resp:  [14-18] 16  BP: (121-147)/(60-76) 147/73  SpO2:  [97 %-100 %] 99 %  Body mass index is 23.95 kg/m .    GENERAL:  Alert, appears comfortable, in no acute distress, appears older than stated age   HEAD:  Normocephalic, without obvious abnormality, atraumatic   EYES:  PERRL, conjunctiva/corneas clear, scleral icterus, EOM's intact   BACK:   Symmetric, no curvature, ROM normal   LUNGS:   Clear to auscultation bilaterally, no rales, rhonchi, or wheezing, symmetric chest rise on inhalation, respirations unlabored   HEART:  Regular rate and rhythm, S1 and S2 normal, no murmur, rub, or gallop    ABDOMEN:    Vague tenderness without guarding rebound or rigidity bowel sounds are present   EXTREMITIES: Extremities normal, atraumatic, no cyanosis 1+ edema    SKIN: Dry to touch, no exanthems in the visualized areas   NEURO: Alert, oriented x3, moves all four extremities freely   PSYCH: Cooperative, behavior is appropriate      Data reviewed today:  I personally reviewed all new medications, labs, imaging/diagnostics reports over the past 24 hours. Pertinent findings include:    Imaging:   No results found for this or any previous visit (from the past 24 hour(s)).    Labs:  Most Recent 3 CBC's:Recent Labs   Lab Test 06/02/22  0449 06/01/22  1751 06/01/22  1226 06/01/22  0602   WBC 15.0* 15.6*  --  14.2*   HGB 8.1* 8.3* 8.5* 6.4*   MCV 88 90  --  90   PLT 94* 89*  --  75*     Most Recent 3 BMP's:Recent Labs   Lab Test 06/02/22  1202 06/02/22  0837 06/02/22  0449 06/02/22  0220 06/01/22  2222 06/01/22  0759 06/01/22  0602   NA  --   --  131*  --  132*  --  130*   POTASSIUM  --   --  3.7  --  3.9  --  3.6   CHLORIDE  --   --  91*  --  92*  --  93*   CO2  --   --  34*  --  33*  --  31   BUN  --   --  19  --  19  --  19   CR  --   --  0.56*  --  0.58*  --  0.57*   ANIONGAP  --   --  6  --  7  --  6   SARTHAK  --   --  8.2*  --  8.2*  --  7.6*   * 229* 307*   < > 287*   < > 315*    < > = values in this interval not displayed.     Most Recent 2 LFT's:Recent Labs   Lab Test 06/02/22 0449 05/31/22  0625   AST 96* 97*   ALT 29 32   ALKPHOS 247* 281*   BILITOTAL 13.9* 13.7*       Medications:   Personally Reviewed.  Medications       ceFAZolin  2 g Intravenous Q8H     FLUoxetine  40 mg Oral At Bedtime     folic acid  1 mg Oral Daily     furosemide  20 mg Intravenous Q8H     [Held by provider] furosemide  40 mg Oral Daily     insulin aspart   Subcutaneous Daily with breakfast     insulin aspart   Subcutaneous Daily with lunch     insulin aspart   Subcutaneous Daily with supper     insulin aspart  1-7 Units Subcutaneous TID AC     insulin aspart  1-5 Units Subcutaneous At Bedtime     insulin glargine  20 Units Subcutaneous QAM     lactulose  20 g Oral 4x Daily     multivitamin, therapeutic  1 tablet Oral Daily     pantoprazole  40 mg Oral BID AC     potassium & sodium phosphates  1 packet Oral TID     sodium chloride (PF)  10 mL Intracatheter Q8H     sodium chloride  (PF)  3 mL Intracatheter Q8H     thiamine  50 mg Oral Daily

## 2022-06-02 NOTE — PROVIDER NOTIFICATION
Rm 354. Pt's mom is really concern about pt hmgb. Would like blood draw tonight, Edema in leg, and no assessment by doctor.      Spoke to doctor. Relayed doctor's messages to family. Family verbally acknowledge understanding of treatment plan and education provided.

## 2022-06-02 NOTE — PLAN OF CARE
Alert and oriented x4. Able to make needs known via call light. No complaints of pain. PICC line on Left midline patent. Compliant with medications. Independent in room. Steady when ambulating. VSS.   Problem: Plan of Care - These are the overarching goals to be used throughout the patient stay.    Goal: Optimal Comfort and Wellbeing  Outcome: Ongoing, Progressing     Problem: Anemia  Goal: Anemia Symptom Improvement  Outcome: Ongoing, Progressing  Intervention: Monitor and Manage Anemia  Recent Flowsheet Documentation  Taken 6/1/2022 2330 by Harish Lucas RN  Safety Promotion/Fall Prevention:   activity supervised   assistive device/personal items within reach   clutter free environment maintained   fall prevention program maintained   increased rounding and observation   increase visualization of patient   safety round/check completed  Taken 6/1/2022 1915 by Harish Lucas, RN  Safety Promotion/Fall Prevention:   activity supervised   assistive device/personal items within reach   clutter free environment maintained   fall prevention program maintained   increased rounding and observation   increase visualization of patient   safety round/check completed

## 2022-06-02 NOTE — PLAN OF CARE
Problem: Electrolyte Imbalance  Goal: Electrolyte Balance  Outcome: Ongoing, Progressing   Goal Outcome Evaluation:    Tele discontinued today. Electrolyte replacement, recheck tomorrow. Midline dressing to LUE dressing changed today. Patient continues on Lactulose reported 2 brown bowel movements today. BLE remain with 3+ edema. Encouraged elevation. Blood glucose elevated this morning, patient did not want to eat lunch today. Home at bedside.

## 2022-06-03 LAB
ALBUMIN SERPL-MCNC: 2.3 G/DL (ref 3.5–5)
ALP SERPL-CCNC: 195 U/L (ref 45–120)
ALT SERPL W P-5'-P-CCNC: 19 U/L (ref 0–45)
ANION GAP SERPL CALCULATED.3IONS-SCNC: 8 MMOL/L (ref 5–18)
AST SERPL W P-5'-P-CCNC: 83 U/L (ref 0–40)
BILIRUB SERPL-MCNC: 11.7 MG/DL (ref 0–1)
BUN SERPL-MCNC: 18 MG/DL (ref 8–22)
CALCIUM SERPL-MCNC: 8 MG/DL (ref 8.5–10.5)
CHLORIDE BLD-SCNC: 92 MMOL/L (ref 98–107)
CO2 SERPL-SCNC: 34 MMOL/L (ref 22–31)
CREAT SERPL-MCNC: 0.58 MG/DL (ref 0.7–1.3)
ERYTHROCYTE [DISTWIDTH] IN BLOOD BY AUTOMATED COUNT: 22.2 % (ref 10–15)
GFR SERPL CREATININE-BSD FRML MDRD: >90 ML/MIN/1.73M2
GLUCOSE BLD-MCNC: 254 MG/DL (ref 70–125)
GLUCOSE BLDC GLUCOMTR-MCNC: 111 MG/DL (ref 70–99)
GLUCOSE BLDC GLUCOMTR-MCNC: 164 MG/DL (ref 70–99)
GLUCOSE BLDC GLUCOMTR-MCNC: 185 MG/DL (ref 70–99)
GLUCOSE BLDC GLUCOMTR-MCNC: 60 MG/DL (ref 70–99)
GLUCOSE BLDC GLUCOMTR-MCNC: 82 MG/DL (ref 70–99)
HCT VFR BLD AUTO: 21 % (ref 40–53)
HGB BLD-MCNC: 7.2 G/DL (ref 13.3–17.7)
MAGNESIUM SERPL-MCNC: 1.4 MG/DL (ref 1.8–2.6)
MAGNESIUM SERPL-MCNC: 2 MG/DL (ref 1.8–2.6)
MCH RBC QN AUTO: 31.6 PG (ref 26.5–33)
MCHC RBC AUTO-ENTMCNC: 34.3 G/DL (ref 31.5–36.5)
MCV RBC AUTO: 92 FL (ref 78–100)
PHOSPHATE SERPL-MCNC: 3.1 MG/DL (ref 2.5–4.5)
PLATELET # BLD AUTO: 81 10E3/UL (ref 150–450)
POTASSIUM BLD-SCNC: 3.7 MMOL/L (ref 3.5–5)
PROT SERPL-MCNC: 6.3 G/DL (ref 6–8)
RBC # BLD AUTO: 2.28 10E6/UL (ref 4.4–5.9)
SODIUM SERPL-SCNC: 134 MMOL/L (ref 136–145)
WBC # BLD AUTO: 12.8 10E3/UL (ref 4–11)

## 2022-06-03 PROCEDURE — 250N000013 HC RX MED GY IP 250 OP 250 PS 637: Performed by: INTERNAL MEDICINE

## 2022-06-03 PROCEDURE — 84100 ASSAY OF PHOSPHORUS: CPT | Performed by: INTERNAL MEDICINE

## 2022-06-03 PROCEDURE — 83605 ASSAY OF LACTIC ACID: CPT | Performed by: INTERNAL MEDICINE

## 2022-06-03 PROCEDURE — 99233 SBSQ HOSP IP/OBS HIGH 50: CPT | Performed by: EMERGENCY MEDICINE

## 2022-06-03 PROCEDURE — 83735 ASSAY OF MAGNESIUM: CPT | Performed by: EMERGENCY MEDICINE

## 2022-06-03 PROCEDURE — 80051 ELECTROLYTE PANEL: CPT | Performed by: INTERNAL MEDICINE

## 2022-06-03 PROCEDURE — 250N000013 HC RX MED GY IP 250 OP 250 PS 637: Performed by: STUDENT IN AN ORGANIZED HEALTH CARE EDUCATION/TRAINING PROGRAM

## 2022-06-03 PROCEDURE — 250N000013 HC RX MED GY IP 250 OP 250 PS 637: Performed by: HOSPITALIST

## 2022-06-03 PROCEDURE — 120N000001 HC R&B MED SURG/OB

## 2022-06-03 PROCEDURE — 36415 COLL VENOUS BLD VENIPUNCTURE: CPT | Performed by: INTERNAL MEDICINE

## 2022-06-03 PROCEDURE — 85027 COMPLETE CBC AUTOMATED: CPT | Performed by: INTERNAL MEDICINE

## 2022-06-03 PROCEDURE — 250N000011 HC RX IP 250 OP 636: Performed by: INTERNAL MEDICINE

## 2022-06-03 PROCEDURE — 83735 ASSAY OF MAGNESIUM: CPT | Performed by: INTERNAL MEDICINE

## 2022-06-03 PROCEDURE — 250N000011 HC RX IP 250 OP 636: Performed by: EMERGENCY MEDICINE

## 2022-06-03 PROCEDURE — 36415 COLL VENOUS BLD VENIPUNCTURE: CPT | Performed by: EMERGENCY MEDICINE

## 2022-06-03 PROCEDURE — 84155 ASSAY OF PROTEIN SERUM: CPT | Performed by: INTERNAL MEDICINE

## 2022-06-03 RX ORDER — OXYCODONE HYDROCHLORIDE 5 MG/1
5 TABLET ORAL ONCE
Status: COMPLETED | OUTPATIENT
Start: 2022-06-04 | End: 2022-06-03

## 2022-06-03 RX ORDER — MAGNESIUM SULFATE 4 G/50ML
4 INJECTION INTRAVENOUS ONCE
Status: COMPLETED | OUTPATIENT
Start: 2022-06-03 | End: 2022-06-03

## 2022-06-03 RX ADMIN — GABAPENTIN 200 MG: 100 CAPSULE ORAL at 00:38

## 2022-06-03 RX ADMIN — FOLIC ACID 1 MG: 1 TABLET ORAL at 08:28

## 2022-06-03 RX ADMIN — CEFAZOLIN SODIUM 2 G: 2 INJECTION, SOLUTION INTRAVENOUS at 08:29

## 2022-06-03 RX ADMIN — INSULIN ASPART 1 UNITS: 100 INJECTION, SOLUTION INTRAVENOUS; SUBCUTANEOUS at 08:30

## 2022-06-03 RX ADMIN — SPIRONOLACTONE 25 MG: 25 TABLET, FILM COATED ORAL at 17:30

## 2022-06-03 RX ADMIN — SPIRONOLACTONE 25 MG: 25 TABLET, FILM COATED ORAL at 13:29

## 2022-06-03 RX ADMIN — CEFAZOLIN SODIUM 2 G: 2 INJECTION, SOLUTION INTRAVENOUS at 23:21

## 2022-06-03 RX ADMIN — FUROSEMIDE 40 MG: 10 INJECTION, SOLUTION INTRAMUSCULAR; INTRAVENOUS at 08:28

## 2022-06-03 RX ADMIN — OXYCODONE HYDROCHLORIDE 5 MG: 5 TABLET ORAL at 23:53

## 2022-06-03 RX ADMIN — LACTULOSE 20 G: 10 SOLUTION ORAL at 17:30

## 2022-06-03 RX ADMIN — THERA TABS 1 TABLET: TAB at 08:28

## 2022-06-03 RX ADMIN — Medication 1 MG: at 00:38

## 2022-06-03 RX ADMIN — CEFAZOLIN SODIUM 2 G: 2 INJECTION, SOLUTION INTRAVENOUS at 17:30

## 2022-06-03 RX ADMIN — SPIRONOLACTONE 25 MG: 25 TABLET, FILM COATED ORAL at 08:28

## 2022-06-03 RX ADMIN — PANTOPRAZOLE SODIUM 40 MG: 20 TABLET, DELAYED RELEASE ORAL at 08:28

## 2022-06-03 RX ADMIN — MAGNESIUM SULFATE IN WATER 4 G: 80 INJECTION, SOLUTION INTRAVENOUS at 09:23

## 2022-06-03 RX ADMIN — FUROSEMIDE 40 MG: 10 INJECTION, SOLUTION INTRAMUSCULAR; INTRAVENOUS at 23:21

## 2022-06-03 RX ADMIN — LACTULOSE 20 G: 10 SOLUTION ORAL at 08:28

## 2022-06-03 RX ADMIN — PANTOPRAZOLE SODIUM 40 MG: 20 TABLET, DELAYED RELEASE ORAL at 17:30

## 2022-06-03 RX ADMIN — FLUOXETINE HYDROCHLORIDE 40 MG: 20 CAPSULE ORAL at 21:15

## 2022-06-03 RX ADMIN — HYDROXYZINE HYDROCHLORIDE 25 MG: 25 TABLET, FILM COATED ORAL at 00:38

## 2022-06-03 RX ADMIN — Medication 50 MG: at 08:27

## 2022-06-03 RX ADMIN — FUROSEMIDE 40 MG: 10 INJECTION, SOLUTION INTRAMUSCULAR; INTRAVENOUS at 17:30

## 2022-06-03 RX ADMIN — TRAZODONE HYDROCHLORIDE 50 MG: 50 TABLET ORAL at 00:38

## 2022-06-03 RX ADMIN — LACTULOSE 20 G: 10 SOLUTION ORAL at 13:29

## 2022-06-03 ASSESSMENT — ACTIVITIES OF DAILY LIVING (ADL)
ADLS_ACUITY_SCORE: 24

## 2022-06-03 NOTE — PROGRESS NOTES
[facility]    Writer spoke with pt's mom to review benefits, home infusion and to offer choice of agency/home infusion provider.  Pt would like to go with [facility] for home infusion needs.  Writer is planning on meeting with mom around 1230 today for a teach, in case pt discharges today or over the weekend.  Pt will need IV abx until 6/7.  Updated Rachel Motta CM.    Thank you for the referral.    Kaleigh Ellington, RN, BSN  [facility] Liaison  [phone] (Mon through Fri, 8:00 am-5:00 pm)  [phone] (Office)

## 2022-06-03 NOTE — PLAN OF CARE
Goal Outcome Evaluation:    Problem: Electrolyte Imbalance  Goal: Electrolyte Balance  Outcome: Ongoing, Progressing   Magneisum replaced with IV today, instructed patient to monitor output to assess better fluid balance.     Dr. Stephenson sat with family and patient today to discuss care and plan with patient and parents. Mother is very involved. Patient is pleasant, denies pain. Does continue to have weakness and swelling. Diarrhea with lactulose. Good appetite but did have a low blood sugar prior to dinner.

## 2022-06-03 NOTE — PROGRESS NOTES
[facility] MEDICINE PROGRESS NOTE      SUMMARY:    26 year old male on hospital day number 11 for severe alcohol induced cirrhosis, upper GI bleed, electrolyte imbalance and staph bacteremia.  Pt has required transfusions, EGD with banding, monitoring of the pancytopenia and management of the lower extremity edema.  He has an underlying diagnosis of Aspergers/Autism. (high functioning)    Problem list:    1.  Alcoholic hepatitis/alcoholic liver disease/severe cirrhosis: numbers have peaked; continue    Regimen of lactulose, rifixamin; mother in communication with [facility] transplant  2.  UGI bleed; s/p EGD with banding 5/24; 3 bands placed; EGD scheduled 7/7 outpt  3.  Anemia: Hgb 7.2; relatively stable  4.  Pancytopenia: stable  5. Staph bacteremia: per ID needs iv abx until 6/7; kefzol 2 grams iv 3 times daily  6.  Electrolyte issues:  Today the Mg was 1.4; will replace and recheck  7.  Bilateral leg edema: A complication of his liver disease; continue aggressive diuretics        Disposition Plan   Expected Discharge: 06/05/2022     Anticipated discharge location: home with family;home with help/services (home with [facility] for IV abx and RN until 6/7, OP PT)    Delays: recurrent bleeding        The patient's care was discussed with the Patient.    Delmi Stephenson MD  [facility]  [facility]  [facility]  Phone: #[phone]    Interval History/Subjective:  Above; dosing of diuretics changed yesterday; Mg needs replacing, afebrile,     Physical Exam/Objective:  Temp:  [98.2  F (36.8  C)-98.3  F (36.8  C)] 98.2  F (36.8  C)  Pulse:  [] 95  Resp:  [16-17] 16  BP: (122-145)/(59-75) 122/75  SpO2:  [95 %-99 %] 95 %  Body mass index is 23.95 kg/m .    GENERAL:  Sleeping, wakes easily; no big complaints;    HEAD:  Normocephalic, without obvious abnormality, atraumatic   EYES:  icteric   NOSE: Nares normal, septum midline, mucosa normal, no drainage   THROAT:  Lips, mucosa, and tongue normal; teeth and gums normal, mouth moist   NECK: Supple, symmetrical, trachea midline   BACK:   Symmetric, no curvature, ROM normal   LUNGS:   Clear to auscultation bilaterally, no rales, rhonchi, or wheezing, symmetric chest rise on inhalation, respirations unlabored   CHEST WALL:  No tenderness or deformity   HEART:  Regular rate and rhythm, S1 and S2 normal, no murmur, rub, or gallop    ABDOMEN:   Soft, nonfocal   EXTREMITIES: Extremities normal, atraumatic, no cyanosis or edema    SKIN: Dry to touch, no exanthems in the visualized areas   NEURO: No asterexis, faint tremor on hands out   PSYCH: Cooperative, behavior is appropriate      Data reviewed today: I personally reviewed all new medications, labs, imaging/diagnostics reports over the past 24 hours. Pertinent findings include:    Imaging:   No results found for this or any previous visit (from the past 24 hour(s)).    Labs:  Most Recent 3 CBC's:Recent Labs   Lab Test 06/03/22  0500 06/02/22  0449 06/01/22  1751   WBC 12.8* 15.0* 15.6*   HGB 7.2* 8.1* 8.3*   MCV 92 88 90   PLT 81* 94* 89*       Medications:   Personally Reviewed.  Medications       ceFAZolin  2 g Intravenous Q8H     FLUoxetine  40 mg Oral At Bedtime     folic acid  1 mg Oral Daily     furosemide  40 mg Intravenous Q8H     [Held by provider] furosemide  40 mg Oral Daily     insulin aspart   Subcutaneous Daily with breakfast     insulin aspart   Subcutaneous Daily with lunch     insulin aspart   Subcutaneous Daily with supper     insulin aspart  1-7 Units Subcutaneous TID AC     insulin aspart  1-5 Units Subcutaneous At Bedtime     insulin glargine  20 Units Subcutaneous QAM     lactulose  20 g Oral 4x Daily     multivitamin, therapeutic  1 tablet Oral Daily     pantoprazole  40 mg Oral BID AC     sodium chloride (PF)  10 mL Intracatheter Q8H     sodium chloride (PF)  3 mL Intracatheter Q8H     spironolactone  25 mg Oral TID     thiamine  50 mg Oral Daily

## 2022-06-03 NOTE — PROGRESS NOTES
Care Management Follow Up    Length of Stay (days): 10    Expected Discharge Date: 06/05/2022     Concerns to be Addressed:       Patient plan of care discussed at interdisciplinary rounds: Yes    Anticipated Discharge Disposition: Home, Home Care, Home Infusion (home with mom and [facility] for IV abx and RN until 6/7, patient/family decline home or OP PT).   Disposition Comments: Anticipate D/C home with Mom and [facility] for IV abx and RN (teaching was done with Mother at hospital on 6/3)- decline Home or OP PT. Mom to transport.  Anticipated Discharge Services:  ([facility] for IV abx and RN until 6/7, OP PT)  Anticipated Discharge DME:  (PICC line)    Patient/family educated on Medicare website which has current facility and service quality ratings:  (CM reviewed HC agency for IV abx, given referral for OP PT)  Education Provided on the Discharge Plan:  Patient and mother confirm plan is to discharge home with [facility] for IV abx and RN. Decline home or OP PT. AVS will be per bedside RN.   Patient/Family in Agreement with the Plan: yes    Referrals Placed by CM/MOLLY: Homecare  Private pay costs discussed: None indicated.   Additional Information:  Chart reviewed. [facility] Nurse Liaison completed teaching with mother at hospital today. Will need HC order for Infusion services and RN if patient discharges prior to completing IV antibiotics (6/7). Patient and mother decline home or OP PT. Mother will transport home.       Rachel Motta RN

## 2022-06-03 NOTE — PROGRESS NOTES
[facility]    Met with patient's mother, Leticia, at bedside for teach of IV antibiotic via Open-ended midline, per her request, in case of discharge over the weekend   Should pt discharge, pt's mother has been taught and is independent with dosing pt's Cefazolin at home.  Pt will complete the IV abx course on 6/7.    Provided hands-on teaching using teaching mats and demo equipment.  Patient taught SAS method and was able to provide return demonstration using aseptic technique.  Delivery of med and supplies will be delivered to patient's home on the day of discharge.  A nurse  will contact patient to set up home nursing visit once discharged.  Patient may need only one SNV, if he is discharged over the weekend, which would be done to pull the midline after antibiotic course is completed on 6/7.    Provided 24/7 phone number and answered all questions.  Patient's mother, Leticia, verbalized understanding of potential IV antibiotic at discharge.    Thank you for the opportunity to provide infusion services to this patient.    Kaleigh Ellington RN, BSN  [facility]  [phone] (Monday through Friday, 8am-5pm)  [phone] (office)

## 2022-06-03 NOTE — PLAN OF CARE
"Goal Outcome Evaluation:             Problem: Plan of Care - These are the overarching goals to be used throughout the patient stay.    Goal: Optimal Comfort and Wellbeing  Outcome: Ongoing, Progressing     BP (!) 145/66 (BP Location: Right arm)   Pulse 100   Temp 98.3  F (36.8  C) (Oral)   Resp 17   Ht 1.676 m (5' 6\")   Wt 67.3 kg (148 lb 6.4 oz)   SpO2 99%   BMI 23.95 kg/m  '    Jarred Soriano RN           "

## 2022-06-03 NOTE — PROGRESS NOTES
CLINICAL NUTRITION SERVICES - REASSESSMENT NOTE     Nutrition Prescription    RECOMMENDATIONS FOR MDs/PROVIDERS TO ORDER:  May need further insulin coverage due to high sugars, may also benefit from a DM education consult      Malnutrition Status:    Severe malnutrition noted      Recommendations already ordered by Registered Dietitian (RD):  Combination diet of Moderate Consistent Carbohydrate and 3 gm NA  Nutrition Supplement/Support: Glucerna 3x/d with meals    Future/Additional Recommendations:  None.      EVALUATION OF THE PROGRESS TOWARD GOALS   Diet: Combination diet of Moderate Consistent Carbohydrate and 3 gm NA  Nutrition Supplement/Support: Glucerna 3x/d with meals  Intake: 75% (6/2 at breakfast), 100% Glucerna 3x/d; ordered adequate meal     NEW FINDINGS   Change in diet order.  Pt reports significant increase in appetite. Tolerating food and Glucerna well.    ANTHROPOMETRICS  Admission wt: 52 kg  Most Recent Weight: 67 kg    Significant wt gain, likely water wt     PHYSICAL FINDINGS  Edema:+2 in R/L arm and hand; +3 in R/L leg, knee, ankle, and feet  GI: distended abdomen, abdominal discomfort  Skin: jaundiced skin color, abdomen bruised    LABS  Reviewed; Na-134, Cl--92, Creatinine-0.58, Ca-8.0, Mg-1.4, Albumin-2.3, Glucose+254    MEDICATIONS  Reviewed; folic acid, novolog, lactulose, MVI, NEURTRA-PHOS, thiamine    NUTRITION DIAGNOSIS  Malnutrition related to chronic ETOH abuse as evidenced by 33% wt loss in a year, ongoing reduced po <50% of est needs, severe fat and muscle loss    Evaluation: progressing     Goals:  1. Patient to consume % of nutritionally adequate meals three times per day, or the equivalent with supplements/snacks.  Evaluation: Goal met; Pt consuming 75% of meals 2-3x/d  2. Promote appropriate wt gain   Evaluation: questionably met; significant gain in wt but likely due to edema  3. Glycemic control wnl   Evaluation: Not met; BG unstable  (+254)    INTERVENTIONS  Implementation  Modified diet to consistent carbohydrate/2g Na  Update: changed to Combination diet of Moderate Consistent Carbohydrate and 3 gm NA with Nutrition Supplement/Support: Glucerna 3x/d with meals  May benefit from a DM education consult   Update: Continue recommendation      Monitoring/Evaluation  Progress toward goals will be monitored and evaluated per protocol.

## 2022-06-03 NOTE — PROVIDER NOTIFICATION
Rm 354. Pt has increased visible tremors. Denies headache, pain, or AMS, or vision changes. Please advise.     Orders placed. Stat INR, Ammonia and COVID PCR lab.

## 2022-06-03 NOTE — PROGRESS NOTES
Home CC note  Paged re tremors w/out HA, pain, AMS, or visual changes, non-focal  RN did check for asterixis. Tremor is at rest and with activity  Will check ammonia/INR, will consider Benzo to treat possible anxiety. Withdrawal at this point in hospitalization would be extremely unlikely, though Benzo withdrawal could be this delayed.  Brought the swing MD up to speed on the situation.  May need psych consult?

## 2022-06-04 LAB
ALBUMIN SERPL-MCNC: 2.5 G/DL (ref 3.5–5)
ALP SERPL-CCNC: 173 U/L (ref 45–120)
ALT SERPL W P-5'-P-CCNC: 20 U/L (ref 0–45)
ANION GAP SERPL CALCULATED.3IONS-SCNC: 6 MMOL/L (ref 5–18)
AST SERPL W P-5'-P-CCNC: 100 U/L (ref 0–40)
BILIRUB SERPL-MCNC: 14.1 MG/DL (ref 0–1)
BUN SERPL-MCNC: 20 MG/DL (ref 8–22)
CALCIUM SERPL-MCNC: 8.3 MG/DL (ref 8.5–10.5)
CHLORIDE BLD-SCNC: 87 MMOL/L (ref 98–107)
CO2 SERPL-SCNC: 37 MMOL/L (ref 22–31)
CREAT SERPL-MCNC: 0.6 MG/DL (ref 0.7–1.3)
ERYTHROCYTE [DISTWIDTH] IN BLOOD BY AUTOMATED COUNT: 22 % (ref 10–15)
ERYTHROCYTE [DISTWIDTH] IN BLOOD BY AUTOMATED COUNT: 22.5 % (ref 10–15)
GFR SERPL CREATININE-BSD FRML MDRD: >90 ML/MIN/1.73M2
GLUCOSE BLD-MCNC: 257 MG/DL (ref 70–125)
GLUCOSE BLDC GLUCOMTR-MCNC: 155 MG/DL (ref 70–99)
GLUCOSE BLDC GLUCOMTR-MCNC: 225 MG/DL (ref 70–99)
GLUCOSE BLDC GLUCOMTR-MCNC: 246 MG/DL (ref 70–99)
GLUCOSE BLDC GLUCOMTR-MCNC: 259 MG/DL (ref 70–99)
HCT VFR BLD AUTO: 20.1 % (ref 40–53)
HCT VFR BLD AUTO: 21.1 % (ref 40–53)
HGB BLD-MCNC: 7 G/DL (ref 13.3–17.7)
HGB BLD-MCNC: 7.4 G/DL (ref 13.3–17.7)
INR PPP: 1.82 (ref 0.85–1.15)
LACTATE SERPL-SCNC: 2.2 MMOL/L (ref 0.7–2)
MAGNESIUM SERPL-MCNC: 1.7 MG/DL (ref 1.8–2.6)
MCH RBC QN AUTO: 31.4 PG (ref 26.5–33)
MCH RBC QN AUTO: 31.9 PG (ref 26.5–33)
MCHC RBC AUTO-ENTMCNC: 34.8 G/DL (ref 31.5–36.5)
MCHC RBC AUTO-ENTMCNC: 35.1 G/DL (ref 31.5–36.5)
MCV RBC AUTO: 90 FL (ref 78–100)
MCV RBC AUTO: 91 FL (ref 78–100)
PHOSPHATE SERPL-MCNC: 3.2 MG/DL (ref 2.5–4.5)
PLATELET # BLD AUTO: 75 10E3/UL (ref 150–450)
PLATELET # BLD AUTO: 84 10E3/UL (ref 150–450)
POTASSIUM BLD-SCNC: 4.2 MMOL/L (ref 3.5–5)
PROT SERPL-MCNC: 6.6 G/DL (ref 6–8)
RBC # BLD AUTO: 2.23 10E6/UL (ref 4.4–5.9)
RBC # BLD AUTO: 2.32 10E6/UL (ref 4.4–5.9)
SODIUM SERPL-SCNC: 130 MMOL/L (ref 136–145)
WBC # BLD AUTO: 11.9 10E3/UL (ref 4–11)
WBC # BLD AUTO: 13.6 10E3/UL (ref 4–11)

## 2022-06-04 PROCEDURE — 85610 PROTHROMBIN TIME: CPT | Performed by: EMERGENCY MEDICINE

## 2022-06-04 PROCEDURE — 250N000013 HC RX MED GY IP 250 OP 250 PS 637: Performed by: HOSPITALIST

## 2022-06-04 PROCEDURE — 250N000013 HC RX MED GY IP 250 OP 250 PS 637: Performed by: STUDENT IN AN ORGANIZED HEALTH CARE EDUCATION/TRAINING PROGRAM

## 2022-06-04 PROCEDURE — 250N000013 HC RX MED GY IP 250 OP 250 PS 637: Performed by: INTERNAL MEDICINE

## 2022-06-04 PROCEDURE — 250N000011 HC RX IP 250 OP 636: Performed by: INTERNAL MEDICINE

## 2022-06-04 PROCEDURE — 84100 ASSAY OF PHOSPHORUS: CPT | Performed by: INTERNAL MEDICINE

## 2022-06-04 PROCEDURE — 83735 ASSAY OF MAGNESIUM: CPT | Performed by: EMERGENCY MEDICINE

## 2022-06-04 PROCEDURE — 36415 COLL VENOUS BLD VENIPUNCTURE: CPT | Performed by: INTERNAL MEDICINE

## 2022-06-04 PROCEDURE — 85014 HEMATOCRIT: CPT | Performed by: INTERNAL MEDICINE

## 2022-06-04 PROCEDURE — 120N000001 HC R&B MED SURG/OB

## 2022-06-04 PROCEDURE — 36415 COLL VENOUS BLD VENIPUNCTURE: CPT | Performed by: EMERGENCY MEDICINE

## 2022-06-04 PROCEDURE — 250N000013 HC RX MED GY IP 250 OP 250 PS 637: Performed by: EMERGENCY MEDICINE

## 2022-06-04 PROCEDURE — 99232 SBSQ HOSP IP/OBS MODERATE 35: CPT | Performed by: EMERGENCY MEDICINE

## 2022-06-04 PROCEDURE — 80053 COMPREHEN METABOLIC PANEL: CPT | Performed by: INTERNAL MEDICINE

## 2022-06-04 RX ADMIN — CEFAZOLIN SODIUM 2 G: 2 INJECTION, SOLUTION INTRAVENOUS at 17:42

## 2022-06-04 RX ADMIN — INSULIN ASPART 2 UNITS: 100 INJECTION, SOLUTION INTRAVENOUS; SUBCUTANEOUS at 08:47

## 2022-06-04 RX ADMIN — SPIRONOLACTONE 25 MG: 25 TABLET, FILM COATED ORAL at 17:42

## 2022-06-04 RX ADMIN — FUROSEMIDE 40 MG: 10 INJECTION, SOLUTION INTRAMUSCULAR; INTRAVENOUS at 17:41

## 2022-06-04 RX ADMIN — PANTOPRAZOLE SODIUM 40 MG: 20 TABLET, DELAYED RELEASE ORAL at 08:46

## 2022-06-04 RX ADMIN — FUROSEMIDE 40 MG: 10 INJECTION, SOLUTION INTRAMUSCULAR; INTRAVENOUS at 08:47

## 2022-06-04 RX ADMIN — INSULIN ASPART 1 UNITS: 100 INJECTION, SOLUTION INTRAVENOUS; SUBCUTANEOUS at 17:39

## 2022-06-04 RX ADMIN — SPIRONOLACTONE 25 MG: 25 TABLET, FILM COATED ORAL at 08:47

## 2022-06-04 RX ADMIN — SPIRONOLACTONE 25 MG: 25 TABLET, FILM COATED ORAL at 12:37

## 2022-06-04 RX ADMIN — FLUOXETINE HYDROCHLORIDE 40 MG: 20 CAPSULE ORAL at 20:19

## 2022-06-04 RX ADMIN — MAGNESIUM 64 MG (MAGNESIUM CHLORIDE) TABLET,DELAYED RELEASE 535 MG: at 12:43

## 2022-06-04 RX ADMIN — CEFAZOLIN SODIUM 2 G: 2 INJECTION, SOLUTION INTRAVENOUS at 08:46

## 2022-06-04 RX ADMIN — MAGNESIUM 64 MG (MAGNESIUM CHLORIDE) TABLET,DELAYED RELEASE 535 MG: at 08:47

## 2022-06-04 RX ADMIN — FOLIC ACID 1 MG: 1 TABLET ORAL at 08:47

## 2022-06-04 RX ADMIN — LACTULOSE 20 G: 10 SOLUTION ORAL at 08:46

## 2022-06-04 RX ADMIN — Medication 50 MG: at 08:47

## 2022-06-04 RX ADMIN — INSULIN ASPART 3 UNITS: 100 INJECTION, SOLUTION INTRAVENOUS; SUBCUTANEOUS at 12:38

## 2022-06-04 RX ADMIN — LACTULOSE 20 G: 10 SOLUTION ORAL at 17:41

## 2022-06-04 RX ADMIN — LACTULOSE 20 G: 10 SOLUTION ORAL at 12:37

## 2022-06-04 RX ADMIN — THERA TABS 1 TABLET: TAB at 08:47

## 2022-06-04 RX ADMIN — PANTOPRAZOLE SODIUM 40 MG: 20 TABLET, DELAYED RELEASE ORAL at 17:42

## 2022-06-04 ASSESSMENT — ACTIVITIES OF DAILY LIVING (ADL)
ADLS_ACUITY_SCORE: 24
ADLS_ACUITY_SCORE: 28
ADLS_ACUITY_SCORE: 24
ADLS_ACUITY_SCORE: 28
ADLS_ACUITY_SCORE: 24

## 2022-06-04 NOTE — PROGRESS NOTES
[facility] MEDICINE PROGRESS NOTE      SUMMARY:    26 year old male on hospital day number 11 for severe alcohol induced cirrhosis, upper GI bleed, electrolyte imbalance and staph bacteremia.  Pt has required transfusions, EGD with banding, monitoring of the pancytopenia and management of the lower extremity edema.  He has an underlying diagnosis of Aspergers/Autism. (high functioning)    Problem list:    1.  Alcoholic hepatitis/alcoholic liver disease/severe cirrhosis: numbers have peaked; continue    Regimen of lactulose, rifixamin; mother in communication with [facility] transplant  2.  UGI bleed; s/p EGD with banding 5/24; 3 bands placed; EGD scheduled 7/7 outpt  3.  Anemia: Hgb 7.0; no melena; recommend trend 2 more days as he is high risk  4.  Pancytopenia: stable  5. Staph bacteremia: per ID needs iv abx until 6/7; kefzol 2 grams iv 3 times daily  6.  Electrolyte issues:  Today the Mg was 1.4; will replace and recheck  7.  Bilateral leg edema: A complication of his liver disease; continue aggressive diuretics    Feels better today though still swollen (not his baseline)        Disposition Plan   Expected Discharge: 06/05/2022     Anticipated discharge location: home with family;home with help/services (home with [facility] for IV abx and RN until 6/7, OP PT)    Delays: recurrent bleeding        The patient's care was discussed with the Patient.    Delmi Stephenson MD  [facility]  [facility]  [facility]  Phone: #[phone]    Interval History/Subjective:  Above; dosing of diuretics changed yesterday; Mg needs replacing, afebrile,     Physical Exam/Objective:  Temp:  [97.6  F (36.4  C)-98.4  F (36.9  C)] 97.6  F (36.4  C)  Pulse:  [] 89  Resp:  [16-18] 16  BP: (119-135)/(63-73) 119/63  SpO2:  [94 %-98 %] 95 %  Body mass index is 23.95 kg/m .    GENERAL:  Sleeping, wakes easily; no big complaints;    HEAD:  Normocephalic, without obvious abnormality,  atraumatic   EYES:  icteric   NOSE: Nares normal, septum midline, mucosa normal, no drainage   THROAT: Lips, mucosa, and tongue normal; teeth and gums normal, mouth moist   NECK: Supple, symmetrical, trachea midline   BACK:   Symmetric, no curvature, ROM normal   LUNGS:   Clear to auscultation bilaterally, no rales, rhonchi, or wheezing, symmetric chest rise on inhalation, respirations unlabored   CHEST WALL:  No tenderness or deformity   HEART:  Regular rate and rhythm, S1 and S2 normal, no murmur, rub, or gallop    ABDOMEN:   Soft, nonfocal   EXTREMITIES: Bilateral swollen pitting edema; stable; no signs of infection   SKIN: Dry to touch, no exanthems in the visualized areas   NEURO: No asterexis, faint tremor on hands out   PSYCH: Cooperative, behavior is appropriate      Data reviewed today: I personally reviewed all new medications, labs, imaging/diagnostics reports over the past 24 hours. Pertinent findings include:    Imaging:   No results found for this or any previous visit (from the past 24 hour(s)).    Labs:  Most Recent 3 CBC's:  Recent Labs   Lab Test 06/04/22  0659 06/03/22  2358 06/03/22  0500   WBC 11.9* 13.6* 12.8*   HGB 7.0* 7.4* 7.2*   MCV 90 91 92   PLT 75* 84* 81*       Medications:   Personally Reviewed.  Medications       ceFAZolin  2 g Intravenous Q8H     FLUoxetine  40 mg Oral At Bedtime     folic acid  1 mg Oral Daily     furosemide  40 mg Intravenous Q8H     [Held by provider] furosemide  40 mg Oral Daily     insulin aspart   Subcutaneous Daily with breakfast     insulin aspart   Subcutaneous Daily with lunch     insulin aspart   Subcutaneous Daily with supper     insulin aspart  1-7 Units Subcutaneous TID AC     insulin aspart  1-5 Units Subcutaneous At Bedtime     insulin glargine  20 Units Subcutaneous QAM     lactulose  20 g Oral 4x Daily     magnesium chloride  535 mg Oral BID 09 12     multivitamin, therapeutic  1 tablet Oral Daily     pantoprazole  40 mg Oral BID AC     sodium  chloride (PF)  10 mL Intracatheter Q8H     sodium chloride (PF)  3 mL Intracatheter Q8H     spironolactone  25 mg Oral TID     thiamine  50 mg Oral Daily

## 2022-06-04 NOTE — PLAN OF CARE
Goal Outcome Evaluation:    Patient has been on and off sleeping today.   No complaints of pain.   Lower extremity swelling has no changes. His left hand however has become swollen. Midline is still patent and looks good, no bleeding today. Patient instructed to elevate and move hand around. MD was notified.

## 2022-06-04 NOTE — PLAN OF CARE
Problem: Plan of Care - These are the overarching goals to be used throughout the patient stay.    Goal: Plan of Care Review/Shift Note  Outcome: Ongoing, Progressing   Goal Outcome Evaluation:      Patient is alert and oriented, patient is independent in the room, patient has been somewhat painful in the left arm at the midline insertion site and patient has been having bleeding from site dressing changed. Legs are edematous. Patient and parent have been anxious.

## 2022-06-04 NOTE — PLAN OF CARE
Problem: Plan of Care - These are the overarching goals to be used throughout the patient stay.    Goal: Plan of Care Review/Shift Note    Outcome: Ongoing, Progressing  6/3/2022 2237 by Tali Ariza RN  Outcome: Ongoing, Progressing   Goal Outcome Evaluation:      Patient is alert and oriented, patient is independent in the room, patient has been somewhat painful in legs,feet and midline insertion site, refused gabapentin and asked that provider be paged for a narcotic. dressing had increased bleeding and was changed. Lower extremities are edematous. Patient has been anxious and mom seems to increase anxiety.

## 2022-06-05 LAB
ABO/RH(D): NORMAL
ALBUMIN SERPL-MCNC: 2.6 G/DL (ref 3.5–5)
ALP SERPL-CCNC: 190 U/L (ref 45–120)
ALT SERPL W P-5'-P-CCNC: 20 U/L (ref 0–45)
ANION GAP SERPL CALCULATED.3IONS-SCNC: 7 MMOL/L (ref 5–18)
ANTIBODY SCREEN: NEGATIVE
AST SERPL W P-5'-P-CCNC: 103 U/L (ref 0–40)
BILIRUB SERPL-MCNC: 14.7 MG/DL (ref 0–1)
BLD PROD TYP BPU: NORMAL
BLOOD COMPONENT TYPE: NORMAL
BUN SERPL-MCNC: 23 MG/DL (ref 8–22)
CALCIUM SERPL-MCNC: 8.6 MG/DL (ref 8.5–10.5)
CHLORIDE BLD-SCNC: 85 MMOL/L (ref 98–107)
CO2 SERPL-SCNC: 37 MMOL/L (ref 22–31)
CODING SYSTEM: NORMAL
CREAT SERPL-MCNC: 0.63 MG/DL (ref 0.7–1.3)
CROSSMATCH: NORMAL
ERYTHROCYTE [DISTWIDTH] IN BLOOD BY AUTOMATED COUNT: 21.4 % (ref 10–15)
ERYTHROCYTE [DISTWIDTH] IN BLOOD BY AUTOMATED COUNT: 21.8 % (ref 10–15)
GFR SERPL CREATININE-BSD FRML MDRD: >90 ML/MIN/1.73M2
GLUCOSE BLD-MCNC: 357 MG/DL (ref 70–125)
GLUCOSE BLDC GLUCOMTR-MCNC: 128 MG/DL (ref 70–99)
GLUCOSE BLDC GLUCOMTR-MCNC: 171 MG/DL (ref 70–99)
GLUCOSE BLDC GLUCOMTR-MCNC: 305 MG/DL (ref 70–99)
GLUCOSE BLDC GLUCOMTR-MCNC: 87 MG/DL (ref 70–99)
HCT VFR BLD AUTO: 19.7 % (ref 40–53)
HCT VFR BLD AUTO: 22 % (ref 40–53)
HGB BLD-MCNC: 6.9 G/DL (ref 13.3–17.7)
HGB BLD-MCNC: 7.6 G/DL (ref 13.3–17.7)
ISSUE DATE AND TIME: NORMAL
MAGNESIUM SERPL-MCNC: 1.6 MG/DL (ref 1.8–2.6)
MCH RBC QN AUTO: 31.4 PG (ref 26.5–33)
MCH RBC QN AUTO: 31.8 PG (ref 26.5–33)
MCHC RBC AUTO-ENTMCNC: 34.5 G/DL (ref 31.5–36.5)
MCHC RBC AUTO-ENTMCNC: 35 G/DL (ref 31.5–36.5)
MCV RBC AUTO: 91 FL (ref 78–100)
MCV RBC AUTO: 91 FL (ref 78–100)
PHOSPHATE SERPL-MCNC: 3.1 MG/DL (ref 2.5–4.5)
PLATELET # BLD AUTO: 77 10E3/UL (ref 150–450)
PLATELET # BLD AUTO: 90 10E3/UL (ref 150–450)
POTASSIUM BLD-SCNC: 4.6 MMOL/L (ref 3.5–5)
PROT SERPL-MCNC: 7 G/DL (ref 6–8)
RBC # BLD AUTO: 2.17 10E6/UL (ref 4.4–5.9)
RBC # BLD AUTO: 2.42 10E6/UL (ref 4.4–5.9)
SODIUM SERPL-SCNC: 129 MMOL/L (ref 136–145)
SPECIMEN EXPIRATION DATE: NORMAL
UNIT ABO/RH: NORMAL
UNIT NUMBER: NORMAL
UNIT STATUS: NORMAL
UNIT TYPE ISBT: 600
WBC # BLD AUTO: 12 10E3/UL (ref 4–11)
WBC # BLD AUTO: 15.3 10E3/UL (ref 4–11)

## 2022-06-05 PROCEDURE — 250N000013 HC RX MED GY IP 250 OP 250 PS 637: Performed by: EMERGENCY MEDICINE

## 2022-06-05 PROCEDURE — 250N000011 HC RX IP 250 OP 636: Performed by: INTERNAL MEDICINE

## 2022-06-05 PROCEDURE — 86901 BLOOD TYPING SEROLOGIC RH(D): CPT | Performed by: EMERGENCY MEDICINE

## 2022-06-05 PROCEDURE — 83735 ASSAY OF MAGNESIUM: CPT | Performed by: EMERGENCY MEDICINE

## 2022-06-05 PROCEDURE — 250N000011 HC RX IP 250 OP 636: Performed by: EMERGENCY MEDICINE

## 2022-06-05 PROCEDURE — 85027 COMPLETE CBC AUTOMATED: CPT | Performed by: INTERNAL MEDICINE

## 2022-06-05 PROCEDURE — 84100 ASSAY OF PHOSPHORUS: CPT | Performed by: INTERNAL MEDICINE

## 2022-06-05 PROCEDURE — 99232 SBSQ HOSP IP/OBS MODERATE 35: CPT | Performed by: EMERGENCY MEDICINE

## 2022-06-05 PROCEDURE — 86923 COMPATIBILITY TEST ELECTRIC: CPT | Performed by: HOSPITALIST

## 2022-06-05 PROCEDURE — 250N000013 HC RX MED GY IP 250 OP 250 PS 637: Performed by: HOSPITALIST

## 2022-06-05 PROCEDURE — 86923 COMPATIBILITY TEST ELECTRIC: CPT | Performed by: EMERGENCY MEDICINE

## 2022-06-05 PROCEDURE — 250N000013 HC RX MED GY IP 250 OP 250 PS 637: Performed by: STUDENT IN AN ORGANIZED HEALTH CARE EDUCATION/TRAINING PROGRAM

## 2022-06-05 PROCEDURE — 120N000001 HC R&B MED SURG/OB

## 2022-06-05 PROCEDURE — 250N000013 HC RX MED GY IP 250 OP 250 PS 637: Performed by: INTERNAL MEDICINE

## 2022-06-05 PROCEDURE — 36415 COLL VENOUS BLD VENIPUNCTURE: CPT | Performed by: INTERNAL MEDICINE

## 2022-06-05 PROCEDURE — P9016 RBC LEUKOCYTES REDUCED: HCPCS | Performed by: EMERGENCY MEDICINE

## 2022-06-05 PROCEDURE — 80053 COMPREHEN METABOLIC PANEL: CPT | Performed by: INTERNAL MEDICINE

## 2022-06-05 RX ORDER — FUROSEMIDE 10 MG/ML
60 INJECTION INTRAMUSCULAR; INTRAVENOUS EVERY 8 HOURS
Status: DISCONTINUED | OUTPATIENT
Start: 2022-06-05 | End: 2022-06-06

## 2022-06-05 RX ORDER — MAGNESIUM SULFATE HEPTAHYDRATE 40 MG/ML
2 INJECTION, SOLUTION INTRAVENOUS ONCE
Status: COMPLETED | OUTPATIENT
Start: 2022-06-05 | End: 2022-06-05

## 2022-06-05 RX ADMIN — FUROSEMIDE 60 MG: 10 INJECTION, SOLUTION INTRAMUSCULAR; INTRAVENOUS at 15:13

## 2022-06-05 RX ADMIN — THERA TABS 1 TABLET: TAB at 09:41

## 2022-06-05 RX ADMIN — MAGNESIUM SULFATE HEPTAHYDRATE 2 G: 40 INJECTION, SOLUTION INTRAVENOUS at 15:13

## 2022-06-05 RX ADMIN — FUROSEMIDE 40 MG: 10 INJECTION, SOLUTION INTRAMUSCULAR; INTRAVENOUS at 00:48

## 2022-06-05 RX ADMIN — MAGNESIUM 64 MG (MAGNESIUM CHLORIDE) TABLET,DELAYED RELEASE 535 MG: at 11:55

## 2022-06-05 RX ADMIN — OXYCODONE HYDROCHLORIDE 2.5 MG: 5 TABLET ORAL at 02:03

## 2022-06-05 RX ADMIN — SPIRONOLACTONE 25 MG: 25 TABLET, FILM COATED ORAL at 11:55

## 2022-06-05 RX ADMIN — PANTOPRAZOLE SODIUM 40 MG: 20 TABLET, DELAYED RELEASE ORAL at 09:41

## 2022-06-05 RX ADMIN — SPIRONOLACTONE 25 MG: 25 TABLET, FILM COATED ORAL at 09:41

## 2022-06-05 RX ADMIN — Medication 50 MG: at 09:41

## 2022-06-05 RX ADMIN — GABAPENTIN 200 MG: 100 CAPSULE ORAL at 20:20

## 2022-06-05 RX ADMIN — LACTULOSE 20 G: 10 SOLUTION ORAL at 09:41

## 2022-06-05 RX ADMIN — CEFAZOLIN SODIUM 2 G: 2 INJECTION, SOLUTION INTRAVENOUS at 00:48

## 2022-06-05 RX ADMIN — MAGNESIUM 64 MG (MAGNESIUM CHLORIDE) TABLET,DELAYED RELEASE 535 MG: at 09:41

## 2022-06-05 RX ADMIN — FLUOXETINE HYDROCHLORIDE 40 MG: 20 CAPSULE ORAL at 20:20

## 2022-06-05 RX ADMIN — INSULIN ASPART 1 UNITS: 100 INJECTION, SOLUTION INTRAVENOUS; SUBCUTANEOUS at 11:51

## 2022-06-05 RX ADMIN — CEFAZOLIN SODIUM 2 G: 2 INJECTION, SOLUTION INTRAVENOUS at 15:13

## 2022-06-05 RX ADMIN — PROCHLORPERAZINE EDISYLATE 10 MG: 5 INJECTION INTRAMUSCULAR; INTRAVENOUS at 20:20

## 2022-06-05 RX ADMIN — SPIRONOLACTONE 25 MG: 25 TABLET, FILM COATED ORAL at 16:03

## 2022-06-05 RX ADMIN — FUROSEMIDE 60 MG: 10 INJECTION, SOLUTION INTRAMUSCULAR; INTRAVENOUS at 20:29

## 2022-06-05 RX ADMIN — INSULIN ASPART 4 UNITS: 100 INJECTION, SOLUTION INTRAVENOUS; SUBCUTANEOUS at 08:09

## 2022-06-05 RX ADMIN — LACTULOSE 20 G: 10 SOLUTION ORAL at 12:00

## 2022-06-05 RX ADMIN — CEFAZOLIN SODIUM 2 G: 2 INJECTION, SOLUTION INTRAVENOUS at 08:08

## 2022-06-05 RX ADMIN — PANTOPRAZOLE SODIUM 40 MG: 20 TABLET, DELAYED RELEASE ORAL at 16:03

## 2022-06-05 RX ADMIN — FOLIC ACID 1 MG: 1 TABLET ORAL at 09:41

## 2022-06-05 RX ADMIN — FUROSEMIDE 40 MG: 10 INJECTION, SOLUTION INTRAMUSCULAR; INTRAVENOUS at 08:08

## 2022-06-05 RX ADMIN — LACTULOSE 20 G: 10 SOLUTION ORAL at 16:04

## 2022-06-05 ASSESSMENT — ACTIVITIES OF DAILY LIVING (ADL)
ADLS_ACUITY_SCORE: 24
ADLS_ACUITY_SCORE: 24
ADLS_ACUITY_SCORE: 26
ADLS_ACUITY_SCORE: 28
ADLS_ACUITY_SCORE: 26
ADLS_ACUITY_SCORE: 26
ADLS_ACUITY_SCORE: 24
ADLS_ACUITY_SCORE: 26
ADLS_ACUITY_SCORE: 24

## 2022-06-05 NOTE — PROGRESS NOTES
[facility] MEDICINE PROGRESS NOTE      SUMMARY:    26 year old male on hospital day number 11 for severe alcohol induced cirrhosis, upper GI bleed, electrolyte imbalance and staph bacteremia.  Pt has required transfusions, EGD with banding, monitoring of the pancytopenia and management of the lower extremity edema.  He has an underlying diagnosis of Aspergers/Autism. (high functioning)    Problem list:    1.  Alcoholic hepatitis/alcoholic liver disease/severe cirrhosis: numbers have peaked; continue    Regimen of lactulose, rifixamin; mother in communication with [facility] transplant  2.  UGI bleed; s/p EGD with banding 5/24; 3 bands placed; EGD scheduled 7/7 outpt  3.  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  Thrombocytopenia: stable  5. Staph bacteremia: per ID needs iv abx until 6/7; discharge on tuesday  6.  Electrolyte issues:  Today the Mg was 1.4; will replace and recheck  7.  Bilateral leg edema: from liver disease; will try to increase the lasix to 60 mg 3 times                                        Daily oral today and reassess  8.  Malnutrition: ongoing supplements        Disposition Plan   Expected Discharge: 06/08/2022     Anticipated discharge location: home with family;home with help/services (home with [facility] for IV abx and RN until 6/7, OP PT)    Delays: recurrent bleeding        The patient's care was discussed with the Patient.    Delmi Stephenson MD  [facility]  [facility]  Phone: #[phone]    Interval History/Subjective:  Above; dosing of diuretics changed yesterday; Mg needs replacing, afebrile,     Physical Exam/Objective:  Temp:  [96.9  F (36.1  C)-98.1  F  (36.7  C)] 98.1  F (36.7  C)  Pulse:  [] 95  Resp:  [14-18] 16  BP: (116-144)/(60-78) 128/64  SpO2:  [93 %-97 %] 93 %  Body mass index is 23.95 kg/m .    GENERAL:  Sleeping, wakes easily; no big complaints;    HEAD:  Normocephalic, without obvious abnormality, atraumatic   EYES:  icteric   NOSE: Nares normal, septum midline, mucosa normal, no drainage   THROAT: Lips, mucosa, and tongue normal; teeth and gums normal, mouth moist   NECK: Supple, symmetrical, trachea midline   BACK:   Symmetric, no curvature, ROM normal   LUNGS:   Clear to auscultation bilaterally, no rales, rhonchi, or wheezing, symmetric chest rise on inhalation, respirations unlabored   CHEST WALL:  No tenderness or deformity   HEART:  Regular rate and rhythm, S1 and S2 normal, no murmur, rub, or gallop    ABDOMEN:   Soft, nonfocal   EXTREMITIES: Bilateral swollen pitting edema; stable; no signs of infection   SKIN: Dry to touch, no exanthems in the visualized areas   NEURO: No asterexis, faint tremor on hands out   PSYCH: Cooperative, behavior is appropriate      Data reviewed today: I personally reviewed all new medications, labs, imaging/diagnostics reports over the past 24 hours. Pertinent findings include:    Imaging:   No results found for this or any previous visit (from the past 24 hour(s)).    Labs:  Most Recent 3 CBC's:  Recent Labs   Lab Test 06/05/22  0720 06/05/22  0006 06/04/22  0659   WBC 12.0* 15.3* 11.9*   HGB 6.9* 7.6* 7.0*   MCV 91 91 90   PLT 77* 90* 75*       Medications:   Personally Reviewed.  Medications       ceFAZolin  2 g Intravenous Q8H     FLUoxetine  40 mg Oral At Bedtime     folic acid  1 mg Oral Daily     furosemide  60 mg Intravenous Q8H     [Held by provider] furosemide  40 mg Oral Daily     insulin aspart   Subcutaneous Daily with breakfast     insulin aspart   Subcutaneous Daily with lunch     insulin aspart   Subcutaneous Daily with supper     insulin aspart  1-7 Units Subcutaneous TID AC     insulin aspart   1-5 Units Subcutaneous At Bedtime     insulin glargine  20 Units Subcutaneous QAM     lactulose  20 g Oral 4x Daily     magnesium chloride  535 mg Oral BID 09 12     magnesium sulfate  2 g Intravenous Once     multivitamin, therapeutic  1 tablet Oral Daily     pantoprazole  40 mg Oral BID AC     sodium chloride (PF)  10 mL Intracatheter Q8H     sodium chloride (PF)  3 mL Intracatheter Q8H     spironolactone  25 mg Oral TID     thiamine  50 mg Oral Daily

## 2022-06-05 NOTE — PROGRESS NOTES
Care Management Follow Up    Length of Stay (days): 12    Expected Discharge Date: 06/08/2022     Concerns to be Addressed:  home with family;home with help/services (home with [facility] for IV abx and RN until 6/7, OP PT)         Patient plan of care discussed at interdisciplinary rounds: Yes    Anticipated Discharge Disposition:  (Anticipate D/C home with Mom and [facility] for IV abx and RN until 6/7; decline Home or OP PT.)  Disposition Comments:  (Anticipate D/C home with Mom and [facility] for IV abx and RN (teaching was done with Mother at hospital on 6/3)- decline Home or OP PT.)  Anticipated Discharge Services:  ([facility] for IV abx and RN until 6/7; declines home or OP PT)    Anticipated Discharge DME:  (PICC line)    Education Provided on the Discharge Plan: AVS per bedside RN.      Patient/Family in Agreement with the Plan: yes (patient and mother)    Referrals Placed by CM/SW: Home care        Additional Information:  Chart reviewed. CM following for possible IV antibiotics at discharge. Patient plans to discharge home with Mom. Patient states family will transport home at time of hospital discharge.       Eileen Kim, CONI

## 2022-06-05 NOTE — PLAN OF CARE
Problem: Plan of Care - These are the overarching goals to be used throughout the patient stay.    Goal: Plan of Care Review/Shift Note  6/4/2022 2057 by Tali Ariza RN  Outcome: Ongoing, Progressing   Goal Outcome Evaluation:      Patient is alert and oriented, patient is independent in the room, patient has been complaining of generalized back pack. Lower extremities and left arm are edematous.

## 2022-06-05 NOTE — PLAN OF CARE
Problem: Plan of Care - These are the overarching goals to be used throughout the patient stay.    Goal: Plan of Care Review/Shift Note  Outcome: Ongoing, Progressing   Goal Outcome Evaluation:      Patient is alert but has been somewhat drowsy, patient has been having slight abdominal discomfort and abdomen is distended.  Lower Extremities are edematous, and left hand is edematous. Patient is jaundiced.

## 2022-06-06 LAB
ALBUMIN SERPL-MCNC: 2.7 G/DL (ref 3.5–5)
ALP SERPL-CCNC: 176 U/L (ref 45–120)
ALT SERPL W P-5'-P-CCNC: 21 U/L (ref 0–45)
ANION GAP SERPL CALCULATED.3IONS-SCNC: 9 MMOL/L (ref 5–18)
AST SERPL W P-5'-P-CCNC: 114 U/L (ref 0–40)
BILIRUB SERPL-MCNC: 15.9 MG/DL (ref 0–1)
BLD PROD TYP BPU: NORMAL
BLOOD COMPONENT TYPE: NORMAL
BUN SERPL-MCNC: 23 MG/DL (ref 8–22)
CALCIUM SERPL-MCNC: 8.8 MG/DL (ref 8.5–10.5)
CHLORIDE BLD-SCNC: 85 MMOL/L (ref 98–107)
CO2 SERPL-SCNC: 36 MMOL/L (ref 22–31)
CODING SYSTEM: NORMAL
CREAT SERPL-MCNC: 0.67 MG/DL (ref 0.7–1.3)
CROSSMATCH: NORMAL
ERYTHROCYTE [DISTWIDTH] IN BLOOD BY AUTOMATED COUNT: 21.3 % (ref 10–15)
GFR SERPL CREATININE-BSD FRML MDRD: >90 ML/MIN/1.73M2
GLUCOSE BLD-MCNC: 230 MG/DL (ref 70–125)
GLUCOSE BLDC GLUCOMTR-MCNC: 150 MG/DL (ref 70–99)
GLUCOSE BLDC GLUCOMTR-MCNC: 164 MG/DL (ref 70–99)
GLUCOSE BLDC GLUCOMTR-MCNC: 189 MG/DL (ref 70–99)
GLUCOSE BLDC GLUCOMTR-MCNC: 189 MG/DL (ref 70–99)
GLUCOSE BLDC GLUCOMTR-MCNC: 201 MG/DL (ref 70–99)
HCT VFR BLD AUTO: 20 % (ref 40–53)
HGB BLD-MCNC: 6.9 G/DL (ref 13.3–17.7)
HGB BLD-MCNC: 7.9 G/DL (ref 13.3–17.7)
HGB BLD-MCNC: 8.8 G/DL (ref 13.3–17.7)
HOLD SPECIMEN: NORMAL
ISSUE DATE AND TIME: NORMAL
MAGNESIUM SERPL-MCNC: 1.7 MG/DL (ref 1.8–2.6)
MCH RBC QN AUTO: 30.9 PG (ref 26.5–33)
MCHC RBC AUTO-ENTMCNC: 34.5 G/DL (ref 31.5–36.5)
MCV RBC AUTO: 90 FL (ref 78–100)
PHOSPHATE SERPL-MCNC: 3.1 MG/DL (ref 2.5–4.5)
PLATELET # BLD AUTO: 80 10E3/UL (ref 150–450)
POTASSIUM BLD-SCNC: 4.5 MMOL/L (ref 3.5–5)
PROT SERPL-MCNC: 7.4 G/DL (ref 6–8)
RBC # BLD AUTO: 2.23 10E6/UL (ref 4.4–5.9)
SODIUM SERPL-SCNC: 130 MMOL/L (ref 136–145)
UNIT ABO/RH: NORMAL
UNIT NUMBER: NORMAL
UNIT STATUS: NORMAL
UNIT TYPE ISBT: 600
WBC # BLD AUTO: 11 10E3/UL (ref 4–11)

## 2022-06-06 PROCEDURE — 250N000013 HC RX MED GY IP 250 OP 250 PS 637: Performed by: INTERNAL MEDICINE

## 2022-06-06 PROCEDURE — 250N000011 HC RX IP 250 OP 636: Performed by: EMERGENCY MEDICINE

## 2022-06-06 PROCEDURE — 250N000013 HC RX MED GY IP 250 OP 250 PS 637: Performed by: EMERGENCY MEDICINE

## 2022-06-06 PROCEDURE — 85018 HEMOGLOBIN: CPT | Performed by: HOSPITALIST

## 2022-06-06 PROCEDURE — P9016 RBC LEUKOCYTES REDUCED: HCPCS | Performed by: HOSPITALIST

## 2022-06-06 PROCEDURE — 250N000013 HC RX MED GY IP 250 OP 250 PS 637: Performed by: STUDENT IN AN ORGANIZED HEALTH CARE EDUCATION/TRAINING PROGRAM

## 2022-06-06 PROCEDURE — 250N000011 HC RX IP 250 OP 636: Performed by: INTERNAL MEDICINE

## 2022-06-06 PROCEDURE — 99233 SBSQ HOSP IP/OBS HIGH 50: CPT | Performed by: HOSPITALIST

## 2022-06-06 PROCEDURE — 250N000013 HC RX MED GY IP 250 OP 250 PS 637: Performed by: HOSPITALIST

## 2022-06-06 PROCEDURE — 120N000001 HC R&B MED SURG/OB

## 2022-06-06 PROCEDURE — 36415 COLL VENOUS BLD VENIPUNCTURE: CPT | Performed by: HOSPITALIST

## 2022-06-06 PROCEDURE — 83735 ASSAY OF MAGNESIUM: CPT | Performed by: EMERGENCY MEDICINE

## 2022-06-06 PROCEDURE — 36415 COLL VENOUS BLD VENIPUNCTURE: CPT | Performed by: EMERGENCY MEDICINE

## 2022-06-06 PROCEDURE — 80053 COMPREHEN METABOLIC PANEL: CPT | Performed by: HOSPITALIST

## 2022-06-06 PROCEDURE — 85027 COMPLETE CBC AUTOMATED: CPT | Performed by: HOSPITALIST

## 2022-06-06 PROCEDURE — 84100 ASSAY OF PHOSPHORUS: CPT | Performed by: EMERGENCY MEDICINE

## 2022-06-06 RX ADMIN — FUROSEMIDE 60 MG: 10 INJECTION, SOLUTION INTRAMUSCULAR; INTRAVENOUS at 05:08

## 2022-06-06 RX ADMIN — FUROSEMIDE 60 MG: 10 INJECTION, SOLUTION INTRAMUSCULAR; INTRAVENOUS at 13:18

## 2022-06-06 RX ADMIN — TRAZODONE HYDROCHLORIDE 50 MG: 50 TABLET ORAL at 03:03

## 2022-06-06 RX ADMIN — CEFAZOLIN SODIUM 2 G: 2 INJECTION, SOLUTION INTRAVENOUS at 08:42

## 2022-06-06 RX ADMIN — MAGNESIUM 64 MG (MAGNESIUM CHLORIDE) TABLET,DELAYED RELEASE 535 MG: at 08:42

## 2022-06-06 RX ADMIN — PANTOPRAZOLE SODIUM 40 MG: 20 TABLET, DELAYED RELEASE ORAL at 17:10

## 2022-06-06 RX ADMIN — FLUOXETINE HYDROCHLORIDE 40 MG: 20 CAPSULE ORAL at 21:39

## 2022-06-06 RX ADMIN — SPIRONOLACTONE 25 MG: 25 TABLET, FILM COATED ORAL at 13:18

## 2022-06-06 RX ADMIN — Medication 50 MG: at 08:42

## 2022-06-06 RX ADMIN — LACTULOSE 20 G: 10 SOLUTION ORAL at 13:18

## 2022-06-06 RX ADMIN — SPIRONOLACTONE 25 MG: 25 TABLET, FILM COATED ORAL at 08:42

## 2022-06-06 RX ADMIN — THERA TABS 1 TABLET: TAB at 08:42

## 2022-06-06 RX ADMIN — PANTOPRAZOLE SODIUM 40 MG: 20 TABLET, DELAYED RELEASE ORAL at 08:46

## 2022-06-06 RX ADMIN — FOLIC ACID 1 MG: 1 TABLET ORAL at 08:42

## 2022-06-06 RX ADMIN — INSULIN ASPART 2 UNITS: 100 INJECTION, SOLUTION INTRAVENOUS; SUBCUTANEOUS at 09:35

## 2022-06-06 RX ADMIN — SPIRONOLACTONE 25 MG: 25 TABLET, FILM COATED ORAL at 17:11

## 2022-06-06 RX ADMIN — INSULIN ASPART 1 UNITS: 100 INJECTION, SOLUTION INTRAVENOUS; SUBCUTANEOUS at 18:50

## 2022-06-06 RX ADMIN — CEFAZOLIN SODIUM 2 G: 2 INJECTION, SOLUTION INTRAVENOUS at 23:30

## 2022-06-06 RX ADMIN — CEFAZOLIN SODIUM 2 G: 2 INJECTION, SOLUTION INTRAVENOUS at 01:01

## 2022-06-06 RX ADMIN — LACTULOSE 20 G: 10 SOLUTION ORAL at 08:42

## 2022-06-06 RX ADMIN — CEFAZOLIN SODIUM 2 G: 2 INJECTION, SOLUTION INTRAVENOUS at 17:11

## 2022-06-06 RX ADMIN — INSULIN ASPART 1 UNITS: 100 INJECTION, SOLUTION INTRAVENOUS; SUBCUTANEOUS at 13:18

## 2022-06-06 RX ADMIN — MAGNESIUM 64 MG (MAGNESIUM CHLORIDE) TABLET,DELAYED RELEASE 535 MG: at 13:30

## 2022-06-06 ASSESSMENT — ACTIVITIES OF DAILY LIVING (ADL)
ADLS_ACUITY_SCORE: 26

## 2022-06-06 NOTE — PLAN OF CARE
Problem: Anemia  Goal: Anemia Symptom Improvement  Outcome: Ongoing, Progressing  Intervention: Monitor and Manage Anemia  Recent Flowsheet Documentation  Taken 6/6/2022 0830 by Evy Fox RN  Safety Promotion/Fall Prevention:    clutter free environment maintained    fall prevention program maintained    increased rounding and observation    nonskid shoes/slippers when out of bed    patient and family education     Hgb was critical at 6.9 this morning. 1 unit of PRBC's started at 11:07 am.     Patient is alert and oriented x 4. Reports having 4 stools so far today. Denies having any bloody or black stools. Ambulates independently in room.     Patient hoping to discharge to home tomorrow with family support pending clinical progress.

## 2022-06-06 NOTE — PLAN OF CARE
Problem: Plan of Care - These are the overarching goals to be used throughout the patient stay.    Goal: Plan of Care Review/Shift Note  6/5/2022 3079 by Tali Ariza RN  Outcome: Ongoing, Progressing   Goal Outcome Evaluation:      Patient is alert and oriented, patient is independent in the room, patient denies pain and has been resting comfortably. Patient is jaundiced, lower extremities are edematous.

## 2022-06-06 NOTE — PROVIDER NOTIFICATION
Dr. Cardoso sent a text page about a critical lab result.     354: MANA   Critical Bilirubin Total - 15.9. Was 14.7 yesterday.     Evy Fox RN

## 2022-06-06 NOTE — PROGRESS NOTES
Hospitalist Progress Note    Assessment/Plan    Active Problems:    Encephalopathy    Alcoholic cirrhosis of liver without ascites (H)    Anemia, unspecified type    Hematemesis, presence of nausea not specified  26-year-old patient with history of Asperger, alcohol induced cirrhosis, upper GI bleeding, status post banding, staph bacteremia admitted to [facility] on May 23 due to concern of severe anemia secondary to hematemesis, encephalopathy and neuropathy weakness, his anemia was thought to be due to blood loss plus hemolytic anemia, see blood transfusion platelet transfusion, GI infectious disease and hematology evaluated the patient,        Acute on chronic anemia  Due to blood loss and hemolysis,  Elevated serum protein to albumin ratio with 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  Hemoglobin was 5.3 on presentation  Hematology saw the patient earlier during the admission, the blood transfusion and hemoglobin above 7, he got 1 unit of blood transfusion yesterday and 1 unit today for hemoglobin below 7, will monitor hemoglobin, no hematemesis or melena,  Total bilirubin elevated discussed with hemolysis      Alcohol liver disease  , Portal hypertension history of alcohol abuse,  GI evaluated the patient,did EGD on May 24 with large esophageal varices, banded,      Alcoholic toxic encephalopathy  With elevated ammonia level, resolved appears to be at baseline,      MSSA bacteremia  UTI with staph aureus and E. Coli transthoracic echo normal,  Infectious disease recommended to continue IV cefazolin through tomorrow      Hyponatremia  Secondary to volume overload improved, on IV Lasix we will switch to p.o.    Diabetes mellitus.   -Diabetic diet  -On Lantus and sliding scale insulin      Hyperkalemia  Discontinue spironolactone his potassium is improved      Severe malnutrition  Secondary to chronic liver disease and cirrhosis, currently on IV furosemide      Barriers to Discharge: IV antibiotics,  monitor hemoglobin    Anticipated discharge date/Disposition: Likely home with home care and 24-hour    Subjective  Patient was seen today, denies any discomfort, mother at bedside discussed the plan of care and they are expecting to go home tomorrow    Objective    Vital signs in last 24 hours  Temp:  [97  F (36.1  C)-99  F (37.2  C)] 97.3  F (36.3  C)  Pulse:  [82-99] 86  Resp:  [16-17] 16  BP: (118-142)/(58-80) 123/69  SpO2:  [96 %-99 %] 98 % [unfilled] O2 Device: None (Room air)    Weight:   [unfilled] Weight change:     Intake/Output last 3 shifts  I/O last 3 completed shifts:  In: 1310 [P.O.:840; I.V.:120]  Out: 675 [Urine:675]  Body mass index is 23.95 kg/m .    Physical Exam:  General Appearance: Alert, oriented x3, does not appear in distress.  Jaundiced  HEENT: Normocephalic. No scleral icterus, . Mucous membranes moist.  Heart: :Regular rate and rhythm, normal S1 ,S2, No murmurs, no JVD, 1+ pedal edema   Lungs: Clear to auscultation bilaterally. No wheezing or crackles  Abdomen: Soft, non tender, no rebound or rigidity, non distended, bowel sounds present.  Extremity: No deformity 1+ pedal   Pertinent Labs   Lab Results: personally reviewed.   Recent Labs   Lab 06/06/22  0654 06/05/22  0720 06/04/22  0659   * 129* 130*   CO2 36* 37* 37*   BUN 23* 23* 20   ALBUMIN 2.7* 2.6* 2.5*   ALKPHOS 176* 190* 173*   ALT 21 20 20   * 103* 100*     Recent Labs   Lab 06/06/22  0935 06/05/22  0720 06/05/22  0006   WBC 11.0 12.0* 15.3*   HGB 6.9* 6.9* 7.6*   HCT 20.0* 19.7* 22.0*   PLT 80* 77* 90*     No results for input(s): CKTOTAL, TROPONINI in the last 168 hours.    Invalid input(s): TROPONINT, CKMBINDEX  Invalid input(s): POCGLUFGR          Advanced Care Planning:  Discharge Planning discussed with patient and his mother at bedside  Total time with this patient is 35 min with 50% of time spent in examining the patient, reviewing records, discussing plan of care and counseling, 50% of time spent in  coordination of care.  Care discussed and coordinated with RN, care manager.      Lisa Cardoso MD  Internal Medicine Hospitalist  6/6/2022

## 2022-06-06 NOTE — PLAN OF CARE
Problem: Plan of Care - These are the overarching goals to be used throughout the patient stay.    Goal: Plan of Care Review/Shift Note  Outcome: Ongoing, Progressing   Goal Outcome Evaluation:        Patient is alert and oriented, patient is independent in the room, patient denies pain and has been resting. Patient complained of tremors in the hands, patient also had bleeding gums, and some nausea. Legs are edematous, and skin is jaundiced.

## 2022-06-07 VITALS
SYSTOLIC BLOOD PRESSURE: 138 MMHG | HEIGHT: 66 IN | RESPIRATION RATE: 16 BRPM | WEIGHT: 148.4 LBS | OXYGEN SATURATION: 94 % | BODY MASS INDEX: 23.85 KG/M2 | HEART RATE: 93 BPM | TEMPERATURE: 98.1 F | DIASTOLIC BLOOD PRESSURE: 77 MMHG

## 2022-06-07 LAB
ALBUMIN SERPL-MCNC: 2.4 G/DL (ref 3.5–5)
ALP SERPL-CCNC: 157 U/L (ref 45–120)
ALT SERPL W P-5'-P-CCNC: 16 U/L (ref 0–45)
ANION GAP SERPL CALCULATED.3IONS-SCNC: 7 MMOL/L (ref 5–18)
AST SERPL W P-5'-P-CCNC: 102 U/L (ref 0–40)
BILIRUB SERPL-MCNC: 12.4 MG/DL (ref 0–1)
BUN SERPL-MCNC: 22 MG/DL (ref 8–22)
CALCIUM SERPL-MCNC: 8.6 MG/DL (ref 8.5–10.5)
CHLORIDE BLD-SCNC: 89 MMOL/L (ref 98–107)
CO2 SERPL-SCNC: 34 MMOL/L (ref 22–31)
CREAT SERPL-MCNC: 0.65 MG/DL (ref 0.7–1.3)
DAT, ANTI-IGG, C3: NORMAL
GFR SERPL CREATININE-BSD FRML MDRD: >90 ML/MIN/1.73M2
GLUCOSE BLD-MCNC: 170 MG/DL (ref 70–125)
GLUCOSE BLDC GLUCOMTR-MCNC: 195 MG/DL (ref 70–99)
GLUCOSE BLDC GLUCOMTR-MCNC: 202 MG/DL (ref 70–99)
GLUCOSE BLDC GLUCOMTR-MCNC: 249 MG/DL (ref 70–99)
HAPTOGLOB SERPL-MCNC: <8 MG/DL (ref 33–171)
HGB BLD-MCNC: 7.2 G/DL (ref 13.3–17.7)
HGB BLD-MCNC: 7.4 G/DL (ref 13.3–17.7)
LDH SERPL L TO P-CCNC: 942 U/L (ref 125–220)
MAGNESIUM SERPL-MCNC: 1.5 MG/DL (ref 1.8–2.6)
PHOSPHATE SERPL-MCNC: 3.2 MG/DL (ref 2.5–4.5)
POTASSIUM BLD-SCNC: 4.4 MMOL/L (ref 3.5–5)
PROT SERPL-MCNC: 6.7 G/DL (ref 6–8)
RETICS # AUTO: 0.19 10E6/UL (ref 0.01–0.11)
RETICS/RBC NFR AUTO: 7.4 % (ref 0.8–2.7)
SODIUM SERPL-SCNC: 130 MMOL/L (ref 136–145)
SPECIMEN EXPIRATION DATE: NORMAL

## 2022-06-07 PROCEDURE — 99239 HOSP IP/OBS DSCHRG MGMT >30: CPT | Performed by: HOSPITALIST

## 2022-06-07 PROCEDURE — 85045 AUTOMATED RETICULOCYTE COUNT: CPT | Performed by: INTERNAL MEDICINE

## 2022-06-07 PROCEDURE — 83615 LACTATE (LD) (LDH) ENZYME: CPT | Performed by: INTERNAL MEDICINE

## 2022-06-07 PROCEDURE — 250N000013 HC RX MED GY IP 250 OP 250 PS 637: Performed by: INTERNAL MEDICINE

## 2022-06-07 PROCEDURE — 85018 HEMOGLOBIN: CPT | Performed by: HOSPITALIST

## 2022-06-07 PROCEDURE — 80053 COMPREHEN METABOLIC PANEL: CPT | Performed by: HOSPITALIST

## 2022-06-07 PROCEDURE — 83735 ASSAY OF MAGNESIUM: CPT | Performed by: HOSPITALIST

## 2022-06-07 PROCEDURE — 84100 ASSAY OF PHOSPHORUS: CPT | Performed by: HOSPITALIST

## 2022-06-07 PROCEDURE — 250N000013 HC RX MED GY IP 250 OP 250 PS 637: Performed by: HOSPITALIST

## 2022-06-07 PROCEDURE — 36415 COLL VENOUS BLD VENIPUNCTURE: CPT | Performed by: INTERNAL MEDICINE

## 2022-06-07 PROCEDURE — 250N000013 HC RX MED GY IP 250 OP 250 PS 637: Performed by: STUDENT IN AN ORGANIZED HEALTH CARE EDUCATION/TRAINING PROGRAM

## 2022-06-07 PROCEDURE — 36415 COLL VENOUS BLD VENIPUNCTURE: CPT | Performed by: HOSPITALIST

## 2022-06-07 PROCEDURE — 83010 ASSAY OF HAPTOGLOBIN QUANT: CPT | Performed by: INTERNAL MEDICINE

## 2022-06-07 PROCEDURE — 86880 COOMBS TEST DIRECT: CPT | Performed by: INTERNAL MEDICINE

## 2022-06-07 PROCEDURE — 250N000011 HC RX IP 250 OP 636: Performed by: INTERNAL MEDICINE

## 2022-06-07 RX ORDER — FUROSEMIDE 40 MG
40 TABLET ORAL 2 TIMES DAILY
Qty: 30 TABLET | Refills: 3 | Status: SHIPPED | OUTPATIENT
Start: 2022-06-07 | End: 2022-08-22

## 2022-06-07 RX ORDER — INSULIN GLARGINE 100 [IU]/ML
25 INJECTION, SOLUTION SUBCUTANEOUS AT BEDTIME
Qty: 15 ML | Refills: 3
Start: 2022-06-07 | End: 2022-07-07

## 2022-06-07 RX ORDER — PANTOPRAZOLE SODIUM 40 MG/1
40 TABLET, DELAYED RELEASE ORAL 2 TIMES DAILY
Qty: 30 TABLET | Refills: 0 | Status: SHIPPED | OUTPATIENT
Start: 2022-06-07 | End: 2022-08-22

## 2022-06-07 RX ORDER — SPIRONOLACTONE 25 MG/1
25 TABLET ORAL
Qty: 180 TABLET | Refills: 1 | Status: ON HOLD | OUTPATIENT
Start: 2022-06-07 | End: 2022-08-02

## 2022-06-07 RX ORDER — GABAPENTIN 100 MG/1
200 CAPSULE ORAL EVERY 4 HOURS PRN
Qty: 20 CAPSULE | Refills: 1 | Status: ON HOLD | OUTPATIENT
Start: 2022-06-07 | End: 2022-06-20

## 2022-06-07 RX ORDER — LACTULOSE 10 G/15ML
20 SOLUTION ORAL 4 TIMES DAILY
Qty: 1892 ML | Refills: 1 | Status: ON HOLD | OUTPATIENT
Start: 2022-06-07 | End: 2022-08-16

## 2022-06-07 RX ORDER — MAGNESIUM OXIDE 400 MG/1
400 TABLET ORAL 3 TIMES DAILY
Status: DISCONTINUED | OUTPATIENT
Start: 2022-06-07 | End: 2022-06-07 | Stop reason: HOSPADM

## 2022-06-07 RX ADMIN — SPIRONOLACTONE 25 MG: 25 TABLET, FILM COATED ORAL at 13:05

## 2022-06-07 RX ADMIN — SPIRONOLACTONE 25 MG: 25 TABLET, FILM COATED ORAL at 09:25

## 2022-06-07 RX ADMIN — Medication 400 MG: at 09:24

## 2022-06-07 RX ADMIN — Medication 50 MG: at 09:24

## 2022-06-07 RX ADMIN — CEFAZOLIN SODIUM 2 G: 2 INJECTION, SOLUTION INTRAVENOUS at 09:35

## 2022-06-07 RX ADMIN — THERA TABS 1 TABLET: TAB at 09:24

## 2022-06-07 RX ADMIN — LACTULOSE 20 G: 10 SOLUTION ORAL at 13:05

## 2022-06-07 RX ADMIN — LACTULOSE 20 G: 10 SOLUTION ORAL at 09:24

## 2022-06-07 RX ADMIN — INSULIN ASPART 2 UNITS: 100 INJECTION, SOLUTION INTRAVENOUS; SUBCUTANEOUS at 12:19

## 2022-06-07 RX ADMIN — TRAZODONE HYDROCHLORIDE 50 MG: 50 TABLET ORAL at 01:54

## 2022-06-07 RX ADMIN — Medication 400 MG: at 13:05

## 2022-06-07 RX ADMIN — INSULIN ASPART 2 UNITS: 100 INJECTION, SOLUTION INTRAVENOUS; SUBCUTANEOUS at 09:21

## 2022-06-07 RX ADMIN — FUROSEMIDE 40 MG: 40 TABLET ORAL at 09:24

## 2022-06-07 RX ADMIN — PANTOPRAZOLE SODIUM 40 MG: 20 TABLET, DELAYED RELEASE ORAL at 09:25

## 2022-06-07 RX ADMIN — FOLIC ACID 1 MG: 1 TABLET ORAL at 09:24

## 2022-06-07 RX ADMIN — Medication 1 MG: at 01:54

## 2022-06-07 ASSESSMENT — ACTIVITIES OF DAILY LIVING (ADL)
ADLS_ACUITY_SCORE: 26

## 2022-06-07 NOTE — PROVIDER NOTIFICATION
Dr. Cardoso sent a text page at this time.     354: M.F.  Nurse managed Mg protocol calls for oral replacement but patient already has scheduled oral Mg. Do you still want replacement per the protocol?    MD held scheduled Mg and noted in order to replace per nurse managed protocol.    Evy Fox RN

## 2022-06-07 NOTE — PLAN OF CARE
Problem: Diabetes Comorbidity  Goal: Blood Glucose Level Within Targeted Range  Outcome: Ongoing, Progressing     Problem: Anemia  Goal: Anemia Symptom Improvement  Outcome: Ongoing, Progressing  Intervention: Monitor and Manage Anemia     Goal Outcome Evaluation:    Patient is pleasant, A&O. Independent in room with mother at bedside. Reported 7 stools throughout yesterday & evening, no blood noted. No emesis or nausea this shift. BLE edema. Midline intact and flushes well. Denies pain. PRN Trazodone given at bedtime. AM hemoglobin 7.4.

## 2022-06-07 NOTE — PROGRESS NOTES
Care Management Discharge Note    Discharge Date: 06/07/2022       Discharge Disposition:   home with Mom      Discharge Services:   declines home or OP PT    Discharge Transportation: family or friend will provide    Education Provided on the Discharge Plan:  AVS per bedside RN.    Persons Notified of Discharge Plans: yes    Patient/Family in Agreement with the Plan: yes     Handoff Referral Completed: Yes    Additional Information:  Chart reviewed. MD placed discharge orders. Patient will discharge home with his Mom. Declined additional assistance from CM. Mom will transport home at time of hospital discharge.         Eileen Kim RN

## 2022-06-07 NOTE — PLAN OF CARE
Goal Outcome Evaluation:        All discharge instructions, follow ups and medications discussed with patient and pt's mom at bedside prior to discharge, whom expressed understanding of discharge information.  Adequate for discharge at this time.

## 2022-06-07 NOTE — PLAN OF CARE
Problem: Anemia  Goal: Anemia Symptom Improvement  Outcome: Adequate for Care Transition  Intervention: Monitor and Manage Anemia  Recent Flowsheet Documentation  Taken 6/7/2022 0900 by Evy Fox, RN  Safety Promotion/Fall Prevention:   clutter free environment maintained   fall prevention program maintained   increased rounding and observation   increase visualization of patient   nonskid shoes/slippers when out of bed   patient and family education   safety round/check completed   No bloody or black stools per patient report. RN able to visualize one BM and no blood noted. VSS on room air and voiding adequately.     Hgb this AM was 7.4 and 7.2 when rechecked at noon. Patient anticipating discharging home today later this afternoon with family support.

## 2022-06-07 NOTE — DISCHARGE SUMMARY
[facility] MEDICINE  DISCHARGE SUMMARY     Primary Care Physician: Gary Rodrigues  Admission Date: 5/23/2022   Discharge Provider: Lisa Cardoso MD Discharge Date: 6/7/2022   Diet:   Active Diet and Nourishment Order   Procedures     Snacks/Supplements Adult: Glucerna; With Meals     Combination Diet Moderate Consistent Carb (60 g CHO per Meal) Diet; 3 gm NA Diet     Diet       Code Status: Full Code   Activity: DCACTIVITY: Activity as tolerated        Condition at Discharge: Stable     REASON FOR PRESENTATION(See Admission Note for Details)   Generalized weakness, severe anemia, hematemesis    PRINCIPAL & ACTIVE DISCHARGE DIAGNOSES     Active Problems:    Encephalopathy    Alcoholic cirrhosis of liver without ascites (H)    Anemia, unspecified type    Hematemesis, presence of nausea not specified      PENDING LABS     Unresulted Labs Ordered in the Past 30 Days of this Admission     Date and Time Order Name Status Description    6/7/2022  1:31 PM Haptoglobin In process     5/26/2022  3:30 PM Prepare pheresed platelets (unit) Preliminary             PROCEDURES ( this hospitalization only)      Procedure(s):  ESOPHAGOGASTRODUODENOSCOPY (EGD) with esophageal banding    RECOMMENDATIONS TO OUTPATIENT PROVIDER FOR F/U VISIT     Follow-up Appointments     Follow-up and recommended labs and tests       Follow up with Dr. Tez Riojas  , at ([facility] clinic   either at [place] or [place] location) , within 3 days  for hospital   follow- up, regarding new diagnosis, and to follow up on results. The   following labs/tests are recommended: CBC.  Phone [phone]  Follow up with hepatology at [facility]  Follow up with [facility] for appointment scheduled next month                 DISPOSITION     Home    SUMMARY OF HOSPITAL COURSE:      26-year-old patient with history of as per car, type 2 diabetes, anxiety, alcohol abuse with cirrhosis portal hypertension, presented  to the hospital on May 23 due to severe anemia hematemesis encephalopathy and generalized body weakness      Metabolic, toxic encephalopathy  With elevated ammonia level,  Was resumed on lactulose and his mentation had improved,      Alcoholic liver disease,  Esophageal paresis due to portal hypertension and alcoholic cirrhosis  History of alcoholic abuse with hepatitis, portal hypertension, coagulopathy thrombocytopenia  Underwent an EGD on May 24 had esophageal varices banded,  GI recommended repeat EGD  Continue PPI, spironolactone and Lasix, patient needs to follow-up with hepatology at [facility]    MSSA bacteremia  UTI with staph aureus and E. Coli  Patient was initiated on cefazolin,  Finished treatment with cefazolin per ID recommendation    Hyponatremia  Chronic, sodium level 130    Hyperkalemia  Was treated, spironolactone was reinitiated, Lasix dose was increased due to swelling,    Severe protein calorie malnutrition  Secondary to chronic liver disease   dietitian was following initiated on Glucerna    Type 2 diabetes  -Lantus dose was adjusted during hospitalization, was requiring 28th of Lantus at bedtime along with sliding scale upon discharge Lantus was resumed at 25 units at bedtime along with carb coverage,    Acute on chronic anemia  Combination of blood loss due to GI bleeding, and hemolysis  Hematology evaluated the patient, during the hospitalization he required multiple transfusions, total was elevated,  Hematology ordered work-up prior to discharge and recommended to follow-up with appointment end of the week,   discussed with Dr. Riojas, hemoglobin stable 7.2 today, will recheck in few days and will arrange for a transfusion and as an outpatient if necessary per hematology      Discharge Medications with Med changes:     Discharge Medication List as of 6/7/2022  3:43 PM      CONTINUE these medications which have CHANGED    Details   furosemide (LASIX) 40 MG tablet Take 1 tablet  (40 mg) by mouth 2 times daily, Disp-30 tablet, R-3, E-Prescribe      gabapentin (NEURONTIN) 100 MG capsule Take 2 capsules (200 mg) by mouth every 4 hours as needed for neuropathic pain (foot pain), Disp-20 capsule, R-1, E-Prescribe      insulin glargine (LANTUS SOLOSTAR) 100 UNIT/ML pen Inject 25 Units Subcutaneous At Bedtime, Disp-15 mL, R-3, No Print OutIf Lantus is not covered by insurance, may substitute Basaglar or Semglee or other insulin glargine product per insurance preference at same dose and frequency.        lactulose (CHRONULAC) 10 GM/15ML solution Take 30 mLs (20 g) by mouth 4 times daily, Disp-1892 mL, R-1, E-Prescribe      pantoprazole (PROTONIX) 40 MG EC tablet Take 1 tablet (40 mg) by mouth 2 times daily, Disp-30 tablet, R-0, E-Prescribe      spironolactone (ALDACTONE) 25 MG tablet Take 1 tablet (25 mg) by mouth 3 times daily, Disp-180 tablet, R-1, E-Prescribe         CONTINUE these medications which have NOT CHANGED    Details   FLUoxetine (PROZAC) 40 MG capsule Take 40 mg by mouth At Bedtime, Historical      folic acid (FOLVITE) 1 MG tablet Take 1 tablet (1 mg) by mouth in the morning., Disp-30 tablet, R-0, E-Prescribe      hydrOXYzine (ATARAX) 25 MG tablet Take 25 mg by mouth every 8 hours as needed for anxiety , Historical      magnesium oxide (MAG-OX) 400 MG tablet Take 1 tablet (400 mg) by mouth in the morning and 1 tablet (400 mg) in the evening., Disp-60 tablet, R-0, E-Prescribe      multivitamin, therapeutic (THERA-VIT) TABS tablet Take 1 tablet by mouth in the morning., OTC      thiamine (B-1) 100 MG tablet Take 1 tablet (100 mg) by mouth in the morning., Disp-30 tablet, R-0, E-Prescribe      alcohol swab prep pads Use to swab area of injection/marsha as directed.Disp-100 each, [id number]      blood glucose (NO BRAND SPECIFIED) test strip Use to test blood sugar 4 times daily or as directed. To accompany: Blood Glucose Monitor Brands: per insurance., Disp-100 strip, R-6, E-Prescribe       blood glucose monitoring (NO BRAND SPECIFIED) meter device kit Use to test blood sugar 4 times daily or as directed. Preferred blood glucose meter OR supplies to accompany: Blood Glucose Monitor Brands: per insurance.Disp-1 kit, [id number]      insulin aspart (NOVOLOG FLEXPEN) 100 UNIT/ML pen 5 units tid with meals and 1 unit per 15 unit of carbohydrate counting, Disp-15 mL, R-3, E-Prescribe      insulin pen needle (ULTICARE MICRO) 32G X 4 MM miscellaneous Use pen needles daily or as directed.Disp-100 each, [id number]      thin (NO BRAND SPECIFIED) lancets Use with lanceting device. To accompany: Blood Glucose Monitor Brands: per insurance., Disp-100 each, R-6, E-Prescribe         STOP taking these medications       ceFAZolin (ANCEF) intermittent infusion 2 g in 100 mL dextrose PRE-MIX Comments:   Reason for Stopping:                     Rationale for medication changes:      Increase Lasix frequency due to worsening swelling,  Require less Lantus dose while inpatient so lower the Lantus dose upon discharge        Consults     GASTROENTEROLOGY IP CONSULT  PHARMACY TO DOSE VANCO  VASCULAR ACCESS PEDS IP CONSULT  VASCULAR ACCESS ADULT IP CONSULT  VASCULAR ACCESS ADULT IP CONSULT  CARE MANAGEMENT / SOCIAL WORK IP CONSULT  INFECTIOUS DISEASES IP CONSULT  INTEGRATIVE THERAPIES CONSULT  VASCULAR ACCESS ADULT IP CONSULT  HEMATOLOGY & ONCOLOGY IP CONSULT  HEMATOLOGY & ONCOLOGY IP CONSULT  PHYSICAL THERAPY ADULT IP CONSULT  OCCUPATIONAL THERAPY ADULT IP CONSULT    Immunizations given this encounter       There is no immunization history on file for this patient.        \      SIGNIFICANT IMAGING FINDINGS     Results for orders placed or performed during the hospital encounter of 05/23/22   XR Chest Port 1 View    Impression    IMPRESSION: Negative chest.   US Abd Hepatic & Portal Vasculature Cmpl    Impression    IMPRESSION:   1. Cirrhotic liver morphology.  2. Patent hepatic vessels with appropriately directed  flow.  3. Gallbladder sludge without sonographic evidence of cholecystitis.   XR Knee AP Standing Bilateral    Impression    IMPRESSION:   RIGHT KNEE: Normal joint spaces and alignment. No fracture.    LEFT KNEE: Normal joint spaces and alignment. No fracture.   US Abdomen Limited    Impression    IMPRESSION: Trace ascites is noted near the liver.   Echocardiogram Complete   Result Value Ref Range    LVEF  60-65%        SIGNIFICANT LABORATORY FINDINGS     Most Recent 3 CBC's:Recent Labs   Lab Test 06/07/22  1249 06/07/22  0542 06/06/22  2202 06/06/22  1611 06/06/22  0935 06/05/22  0720 06/05/22  0006   WBC  --   --   --   --  11.0 12.0* 15.3*   HGB 7.2* 7.4* 7.9*   < > 6.9* 6.9* 7.6*   MCV  --   --   --   --  90 91 91   PLT  --   --   --   --  80* 77* 90*    < > = values in this interval not displayed.     Most Recent 3 BMP's:Recent Labs   Lab Test 06/07/22  1218 06/07/22  0920 06/07/22  0542 06/06/22  0815 06/06/22  0654 06/05/22  0749 06/05/22  0720   NA  --   --  130*  --  130*  --  129*   POTASSIUM  --   --  4.4  --  4.5  --  4.6   CHLORIDE  --   --  89*  --  85*  --  85*   CO2  --   --  34*  --  36*  --  37*   BUN  --   --  22  --  23*  --  23*   CR  --   --  0.65*  --  0.67*  --  0.63*   ANIONGAP  --   --  7  --  9  --  7   SARTHAK  --   --  8.6  --  8.8  --  8.6   * 202* 170*   < > 230*   < > 357*    < > = values in this interval not displayed.     Most Recent 2 LFT's:Recent Labs   Lab Test 06/07/22  0542 06/06/22  0654   * 114*   ALT 16 21   ALKPHOS 157* 176*   BILITOTAL 12.4* 15.9*           Discharge Orders        **Basic metabolic panel FUTURE 14d     Reason for your hospital stay    Encephalopathy, anemia requiring blood transfusion, esophageal varices with banding     Activity    Your activity upon discharge: activity as tolerated     Follow-up and recommended labs and tests     Follow up with Dr. Tez Riojas  , at ([facility] either at [place] or [facility]) ,  within 3 days  for hospital follow- up, regarding new diagnosis, and to follow up on results. The following labs/tests are recommended: CBC.  Phone [phone]  Follow up with hepatology at [facility]  Follow up with [facility] for appointment scheduled next month     Diet    Follow this diet upon discharge: Orders Placed This Encounter      Snacks/Supplements Adult: Glucerna; With Meals      Combination Diet Moderate Consistent Carb (60 g CHO per Meal) Diet; 3 gm NA Diet     **CBC with platelets differential FUTURE 2mo       Examination   Physical Exam   Temp:  [98.1  F (36.7  C)-98.7  F (37.1  C)] 98.1  F (36.7  C)  Pulse:  [93-97] 93  Resp:  [16] 16  BP: (134-138)/(63-77) 138/77  SpO2:  [94 %-97 %] 94 %  Wt Readings from Last 1 Encounters:   06/01/22 67.3 kg (148 lb 6.4 oz)         General Appearance: Alert, oriented x3, does not appear in distress.  Jaundiced  HEENT: Normocephalic. No scleral icterus, . Mucous membranes moist.  Heart: :Regular rate and rhythm, normal S1 ,S2, No murmurs, no JVD, 1+ pedal edema   Lungs: Clear to auscultation bilaterally. No wheezing or crackles  Abdomen: Soft, non tender, no rebound or rigidity, non distended, bowel sounds present.  Extremity: No deformity 1+ pedal  edema    Please see EMR for more detailed significant labs, imaging, consultant notes etc.    I, Lisa Cardoso MD, personally saw the patient today and spent greater than 30 minutes discharging this patient.    Lisa Cardoso MD  [facility]    CC:Gary Rodrigues

## 2022-06-08 ENCOUNTER — PATIENT OUTREACH (OUTPATIENT)
Dept: CARE COORDINATION | Facility: CLINIC | Age: 27
End: 2022-06-08
Payer: COMMERCIAL

## 2022-06-08 DIAGNOSIS — Z71.89 OTHER SPECIFIED COUNSELING: ICD-10-CM

## 2022-06-08 NOTE — PROGRESS NOTES
Clinic Care Coordination Contact  [facility]/Voicemail     Clinical Data: Care Coordinator Outreach - TCM      Outreach attempted x 1.  Left message on patient's voicemail, providing [facility]'s 24/7 scheduling and nurse triage phone number [phone] ([phone]) for questions/concerns and/or to schedule an appt with an [facility] provider, if they do not have a PCP.      Plan:  Care Coordinator will try to reach patient again in 1-2 business days.       Noa Valencia RN  [facility], [facility]

## 2022-06-09 ENCOUNTER — LAB (OUTPATIENT)
Dept: INFUSION THERAPY | Facility: CLINIC | Age: 27
End: 2022-06-09
Attending: INTERNAL MEDICINE
Payer: COMMERCIAL

## 2022-06-09 ENCOUNTER — ONCOLOGY VISIT (OUTPATIENT)
Dept: ONCOLOGY | Facility: CLINIC | Age: 27
End: 2022-06-09
Attending: INTERNAL MEDICINE
Payer: COMMERCIAL

## 2022-06-09 VITALS
BODY MASS INDEX: 21.79 KG/M2 | OXYGEN SATURATION: 99 % | HEIGHT: 66 IN | SYSTOLIC BLOOD PRESSURE: 128 MMHG | WEIGHT: 135.6 LBS | DIASTOLIC BLOOD PRESSURE: 68 MMHG | HEART RATE: 89 BPM | TEMPERATURE: 98.2 F

## 2022-06-09 DIAGNOSIS — S31.109S OPEN WOUND OF ABDOMINAL WALL, SEQUELA: ICD-10-CM

## 2022-06-09 DIAGNOSIS — D64.9 ANEMIA, UNSPECIFIED TYPE: ICD-10-CM

## 2022-06-09 DIAGNOSIS — K70.10 ALCOHOLIC HEPATITIS WITHOUT ASCITES (H): ICD-10-CM

## 2022-06-09 DIAGNOSIS — D64.9 ANEMIA, UNSPECIFIED TYPE: Primary | ICD-10-CM

## 2022-06-09 LAB
BASOPHILS # BLD MANUAL: 0.2 10E3/UL (ref 0–0.2)
BASOPHILS NFR BLD MANUAL: 2 %
BURR CELLS BLD QL SMEAR: SLIGHT
EOSINOPHIL # BLD MANUAL: 0 10E3/UL (ref 0–0.7)
EOSINOPHIL NFR BLD MANUAL: 0 %
ERYTHROCYTE [DISTWIDTH] IN BLOOD BY AUTOMATED COUNT: 20.2 % (ref 10–15)
HCT VFR BLD AUTO: 23 % (ref 40–53)
HGB BLD-MCNC: 7.8 G/DL (ref 13.3–17.7)
HOLD SPECIMEN: NORMAL
LYMPHOCYTES # BLD MANUAL: 2.3 10E3/UL (ref 0.8–5.3)
LYMPHOCYTES NFR BLD MANUAL: 20 %
MCH RBC QN AUTO: 32 PG (ref 26.5–33)
MCHC RBC AUTO-ENTMCNC: 33.9 G/DL (ref 31.5–36.5)
MCV RBC AUTO: 94 FL (ref 78–100)
MONOCYTES # BLD MANUAL: 0.8 10E3/UL (ref 0–1.3)
MONOCYTES NFR BLD MANUAL: 7 %
MYELOCYTES # BLD MANUAL: 0.1 10E3/UL
MYELOCYTES NFR BLD MANUAL: 1 %
NEUTROPHILS # BLD MANUAL: 8.1 10E3/UL (ref 1.6–8.3)
NEUTROPHILS NFR BLD MANUAL: 70 %
PLAT MORPH BLD: ABNORMAL
PLATELET # BLD AUTO: 87 10E3/UL (ref 150–450)
RBC # BLD AUTO: 2.44 10E6/UL (ref 4.4–5.9)
RBC MORPH BLD: ABNORMAL
SPHEROCYTES BLD QL SMEAR: SLIGHT
TARGETS BLD QL SMEAR: SLIGHT
WBC # BLD AUTO: 11.5 10E3/UL (ref 4–11)

## 2022-06-09 PROCEDURE — 36415 COLL VENOUS BLD VENIPUNCTURE: CPT

## 2022-06-09 PROCEDURE — 85007 BL SMEAR W/DIFF WBC COUNT: CPT

## 2022-06-09 PROCEDURE — 85027 COMPLETE CBC AUTOMATED: CPT

## 2022-06-09 PROCEDURE — 99203 OFFICE O/P NEW LOW 30 MIN: CPT | Performed by: NURSE PRACTITIONER

## 2022-06-09 PROCEDURE — G0463 HOSPITAL OUTPT CLINIC VISIT: HCPCS

## 2022-06-09 ASSESSMENT — PAIN SCALES - GENERAL: PAINLEVEL: NO PAIN (0)

## 2022-06-09 NOTE — PROGRESS NOTES
[facility] Hematology and Oncology Progress Note    Patient: Dillon Salter  MRN: [id number]  Date of Service: Jun 9, 2022         Reason for Visit:    Patient requested early f/up concerned about his HgB.    Assessment and Plan:    1. Acute on chronic anemia, secondary to variceal bleeding    26 year old.  Asperger's autism.  Alcoholic cirrhosis and portal hypertension.  Lives with his parents.  He is mother is his primary caregiver.  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  No family history of blood disease.    Dillon presents accompanied by his mother.  He was hospitalized 05/23 - 06/07/2022 with generalized weakness, severe anemia, and hematemesis.  His hemoglobin was 5.3, down from 8.3 the month prior.  He had coffee-ground emesis a couple times prior to admission.  No report of bright red blood loss.  He received several RBC transfusions and a unit of platelets for platelet count of 34,000.  EGD found bleeding esophageal varices which were banded.  His hemoglobin was stabilized and he was discharged home with GI and HEME f/up.  Mc states he's been feeling better.  States he quit alcohol on April 6th after ~5 years heavy alcohol use with no plans of resuming.  Appetite good - eating what he pleases.  No epistaxis.  No hematemesis.  On lactulose.  He denies cough, fever, chills, unusual headaches, visual or mentation disturbance, bowel or bladder issues, rash.    CBC - HgB 7.8 (7.2 on 06/07/22).  Plts 87K (80K on 06/07/22).  WBC 11.5    Hematologies improved since hospital discharge. Not concernedly symptomatic.    Continue with transfusion support as needed.     Target platelet count > 50,000.    Target HgB > 7.0 g/dL.    Advised to monitor for s/s of bleeding or blood loss.    Counseled on need for continued alcohol abstinence.    Continue folic acid and B12 supplementation.    Follow up GI.  Updated EGD scheduled in early July.    1-week f/up with CBC to assess for transfusion  needs.    Hematologic History:    1. Acute on chronic anemia, secondary to variceal bleeding    Hospitalized 05/23 - 06/07/2022 with generalized weakness, severe anemia, and hematemesis.       Cirrhosis of liver with portal hypertension, secondary to alcohol use:    HgB 5.3, down from 8.3 the month prior.      Coffee-ground emesis prior to admission.  No report of bright red blood loss.     Elevated INR secondary to liver disease    No evidence of B12 or folate deficiency on supplement.      LDH elevated at 823.      Bilirubin markedly elevated - mainly direct bilirubin.  Haptoglobin is undetectable.     Evidence of hemolysis by lab parameters in the setting of alcoholic liver disease.       Reticulocytosis suggesting reactive marrow and likely active bleeding.      Risk of bleeding fairly high given his abnormal coagulopathy and thrombocytopenia.      I will send out morphology review.  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     Mild encephalopathy    He received several RBC transfusions and a unit of platelets for platelet count of 34,000.      EGD found bleeding esophageal varices which were banded.      HgB stabilized and he was discharged home with GI and HEME f/up.      ECOG Performance:    2 - Ambulatory and independent in all ADLs; cannot work; up > 50% of the time    Pain:    Pain Score: No Pain (0)    Encounter Diagnoses:    Problem List Items Addressed This Visit        Digestive    Alcoholic hepatitis       Musculoskeletal and Integumentary    Open abdominal wall wound       Hematologic    Anemia, unspecified type - Primary             28 minutes spent on the date of the encounter doing chart review, history and exam, documentation and further activities as noted above.    eWs Peña, CNP     CC: Gary Rodrigues MD   ______________________________________________________________________________    Review of Systems:    No fever or night sweats.  No loss of weight.  No lumps or  "bumps anywhere.  No unusual headaches or eyesight issues.  No dizziness.  No bleeding from the nose.  No sores in the mouth. No problems with swallowing.  No chest pain. No shortness of breath. No cough.  No abdominal pain. No nausea or vomiting.  No diarrhea or constipation.  No blood in stool or black colored stools.  No problems passing urine.  No numbness or tingling in hands or feet.  No skin rashes.    A 14 point review of systems is otherwise negative.    Past History:    Past Medical History:   Diagnosis Date     Diabetes (H)        Past Surgical History:   Procedure Laterality Date     ESOPHAGOSCOPY, GASTROSCOPY, DUODENOSCOPY (EGD), COMBINED N/A 5/24/2022    Procedure: ESOPHAGOGASTRODUODENOSCOPY (EGD) with esophageal banding;  Surgeon: Gary Hurst MD;  Location: [facility] Main OR     MIDLINE DOUBLE LUMEN PLACEMENT  5/26/2022          Physical Exam:    /68 (BP Location: Left arm, Patient Position: Sitting, Cuff Size: Adult Small)   Pulse 89   Temp 98.2  F (36.8  C)   Ht 1.676 m (5' 6\")   Wt 61.5 kg (135 lb 9.6 oz)   SpO2 99%   BMI 21.89 kg/m      GENERAL:   Alert and oriented. Seated comfortably. In no acute distress.    HEENT:   Atraumatic and normocephalic.  JUSTYNA.  EOMI.  Actinic sclera.  No mucosal lesions.    LYMPH NODES:  No palpable cervical, axillary or inguinal lymphadenopathy.    CHEST:   Lungs clear to auscultation bilaterally.    CVS:    S1 and S2 heard. Regular rate and rhythm.  No murmur, gallop or rub heard.    EXTREMITIES:  Warm.    SKIN:    Jaundiced. No rash, bruising or purpura noted.  Full head of hair.    Lab Results:    Reviewed with patient.    Recent Results (from the past 24 hour(s))   CBC with platelets and differential    Collection Time: 06/09/22 11:31 AM   Result Value Ref Range    WBC Count 11.5 (H) 4.0 - 11.0 10e3/uL    RBC Count 2.44 (L) 4.40 - 5.90 10e6/uL    Hemoglobin 7.8 (L) 13.3 - 17.7 g/dL    Hematocrit 23.0 (L) 40.0 - 53.0 %    MCV 94 78 - 100 fL    " MCH 32.0 26.5 - 33.0 pg    MCHC 33.9 31.5 - 36.5 g/dL    RDW 20.2 (H) 10.0 - 15.0 %    Platelet Count 87 (L) 150 - 450 10e3/uL   Extra Red Top Tube    Collection Time: 06/09/22 11:31 AM   Result Value Ref Range    Hold Specimen JI    Manual Differential    Collection Time: 06/09/22 11:31 AM   Result Value Ref Range    % Neutrophils 70 %    % Lymphocytes 20 %    % Monocytes 7 %    % Eosinophils 0 %    % Basophils 2 %    % Myelocytes 1 %    Absolute Neutrophils 8.1 1.6 - 8.3 10e3/uL    Absolute Lymphocytes 2.3 0.8 - 5.3 10e3/uL    Absolute Monocytes 0.8 0.0 - 1.3 10e3/uL    Absolute Eosinophils 0.0 0.0 - 0.7 10e3/uL    Absolute Basophils 0.2 0.0 - 0.2 10e3/uL    Absolute Myelocytes 0.1 (H) <=0.0 10e3/uL    RBC Morphology Confirmed RBC Indices     Platelet Assessment  Automated Count Confirmed. Platelet morphology is normal.     Automated Count Confirmed. Platelet morphology is normal.    Syd Cells Slight (A) None Seen    Spherocytes Slight (A) None Seen    Target Cells Slight (A) None Seen     Imaging:    No new imaging.

## 2022-06-09 NOTE — PROGRESS NOTES
"Oncology Rooming Note    June 9, 2022 11:41 AM   Dillon Salter is a 26 year old male who presents for:    Chief Complaint   Patient presents with     Hematology     Initial Vitals: /68 (BP Location: Left arm, Patient Position: Sitting, Cuff Size: Adult Small)   Pulse 89   Temp 98.2  F (36.8  C)   Ht 1.676 m (5' 6\")   Wt 61.5 kg (135 lb 9.6 oz)   SpO2 99%   BMI 21.89 kg/m   Estimated body mass index is 21.89 kg/m  as calculated from the following:    Height as of this encounter: 1.676 m (5' 6\").    Weight as of this encounter: 61.5 kg (135 lb 9.6 oz). Body surface area is 1.69 meters squared.  No Pain (0) Comment: Data Unavailable   No LMP for male patient.  Allergies reviewed: Yes  Medications reviewed: Yes    Medications: Medication refills not needed today.  Pharmacy name entered into [facility]: [id number] [place], [place] - [address] [facility] [address]  [facility]    Clinical concerns: follow up      Kelsea Yang MA            "

## 2022-06-09 NOTE — LETTER
6/9/2022         RE: Dillon Salter  [address]  [place] [address]        Dear Colleague,    Thank you for referring your patient, Dillon Salter, to the [facility] [place]. Please see a copy of my visit note below.    [facility] Hematology and Oncology Progress Note    Patient: Dillon Salter  MRN: [id number]  Date of Service: Jun 9, 2022         Reason for Visit:    Patient requested early f/up concerned about his HgB.    Assessment and Plan:    1. Acute on chronic anemia, secondary to variceal bleeding    26 year old.  Asperger's autism.  Alcoholic cirrhosis and portal hypertension.  Lives with his parents.  He is mother is his primary caregiver.  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  No family history of blood disease.    Dillon presents accompanied by his mother.  He was hospitalized 05/23 - 06/07/2022 with generalized weakness, severe anemia, and hematemesis.  His hemoglobin was 5.3, down from 8.3 the month prior.  He had coffee-ground emesis a couple times prior to admission.  No report of bright red blood loss.  He received several RBC transfusions and a unit of platelets for platelet count of 34,000.  EGD found bleeding esophageal varices which were banded.  His hemoglobin was stabilized and he was discharged home with GI and HEME f/up.  Mc states he's been feeling better.  States he quit alcohol on April 6th after ~5 years heavy alcohol use with no plans of resuming.  Appetite good - eating what he pleases.  No epistaxis.  No hematemesis.  On lactulose.  He denies cough, fever, chills, unusual headaches, visual or mentation disturbance, bowel or bladder issues, rash.    CBC - HgB 7.8 (7.2 on 06/07/22).  Plts 87K (80K on 06/07/22).  WBC 11.5    Hematologies improved since hospital discharge. Not concernedly symptomatic.    Continue with transfusion support as needed.     Target platelet count > 50,000.    Target HgB > 7.0 g/dL.    Advised to monitor for  s/s of bleeding or blood loss.    Counseled on need for continued alcohol abstinence.    Continue folic acid and B12 supplementation.    Follow up GI.  Updated EGD scheduled in early July.    1-week f/up with CBC to assess for transfusion needs.    Hematologic History:    1. Acute on chronic anemia, secondary to variceal bleeding    Hospitalized 05/23 - 06/07/2022 with generalized weakness, severe anemia, and hematemesis.       Cirrhosis of liver with portal hypertension, secondary to alcohol use:    HgB 5.3, down from 8.3 the month prior.      Coffee-ground emesis prior to admission.  No report of bright red blood loss.     Elevated INR secondary to liver disease    No evidence of B12 or folate deficiency on supplement.      LDH elevated at 823.      Bilirubin markedly elevated - mainly direct bilirubin.  Haptoglobin is undetectable.     Evidence of hemolysis by lab parameters in the setting of alcoholic liver disease.       Reticulocytosis suggesting reactive marrow and likely active bleeding.      Risk of bleeding fairly high given his abnormal coagulopathy and thrombocytopenia.      I will send out morphology review.  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     Mild encephalopathy    He received several RBC transfusions and a unit of platelets for platelet count of 34,000.      EGD found bleeding esophageal varices which were banded.      HgB stabilized and he was discharged home with GI and HEME f/up.      ECOG Performance:    2 - Ambulatory and independent in all ADLs; cannot work; up > 50% of the time    Pain:    Pain Score: No Pain (0)    Encounter Diagnoses:    Problem List Items Addressed This Visit        Digestive    Alcoholic hepatitis       Musculoskeletal and Integumentary    Open abdominal wall wound       Hematologic    Anemia, unspecified type - Primary             28 minutes spent on the date of the encounter doing chart review, history and exam, documentation and further  "activities as noted above.    Wes Peña, CNP     CC: Gary Rodrigues MD   ______________________________________________________________________________    Review of Systems:    No fever or night sweats.  No loss of weight.  No lumps or bumps anywhere.  No unusual headaches or eyesight issues.  No dizziness.  No bleeding from the nose.  No sores in the mouth. No problems with swallowing.  No chest pain. No shortness of breath. No cough.  No abdominal pain. No nausea or vomiting.  No diarrhea or constipation.  No blood in stool or black colored stools.  No problems passing urine.  No numbness or tingling in hands or feet.  No skin rashes.    A 14 point review of systems is otherwise negative.    Past History:    Past Medical History:   Diagnosis Date     Diabetes (H)        Past Surgical History:   Procedure Laterality Date     ESOPHAGOSCOPY, GASTROSCOPY, DUODENOSCOPY (EGD), COMBINED N/A 5/24/2022    Procedure: ESOPHAGOGASTRODUODENOSCOPY (EGD) with esophageal banding;  Surgeon: Gary Hurst MD;  Location: [facility] Main OR     MIDLINE DOUBLE LUMEN PLACEMENT  5/26/2022          Physical Exam:    /68 (BP Location: Left arm, Patient Position: Sitting, Cuff Size: Adult Small)   Pulse 89   Temp 98.2  F (36.8  C)   Ht 1.676 m (5' 6\")   Wt 61.5 kg (135 lb 9.6 oz)   SpO2 99%   BMI 21.89 kg/m      GENERAL:   Alert and oriented. Seated comfortably. In no acute distress.    HEENT:   Atraumatic and normocephalic.  JUSTYNA.  EOMI.  Actinic sclera.  No mucosal lesions.    LYMPH NODES:  No palpable cervical, axillary or inguinal lymphadenopathy.    CHEST:   Lungs clear to auscultation bilaterally.    CVS:    S1 and S2 heard. Regular rate and rhythm.  No murmur, gallop or rub heard.    EXTREMITIES:  Warm.    SKIN:    Jaundiced. No rash, bruising or purpura noted.  Full head of hair.    Lab Results:    Reviewed with patient.    Recent Results (from the past 24 hour(s))   CBC with platelets and differential " "   Collection Time: 06/09/22 11:31 AM   Result Value Ref Range    WBC Count 11.5 (H) 4.0 - 11.0 10e3/uL    RBC Count 2.44 (L) 4.40 - 5.90 10e6/uL    Hemoglobin 7.8 (L) 13.3 - 17.7 g/dL    Hematocrit 23.0 (L) 40.0 - 53.0 %    MCV 94 78 - 100 fL    MCH 32.0 26.5 - 33.0 pg    MCHC 33.9 31.5 - 36.5 g/dL    RDW 20.2 (H) 10.0 - 15.0 %    Platelet Count 87 (L) 150 - 450 10e3/uL   Extra Red Top Tube    Collection Time: 06/09/22 11:31 AM   Result Value Ref Range    Hold Specimen JIC    Manual Differential    Collection Time: 06/09/22 11:31 AM   Result Value Ref Range    % Neutrophils 70 %    % Lymphocytes 20 %    % Monocytes 7 %    % Eosinophils 0 %    % Basophils 2 %    % Myelocytes 1 %    Absolute Neutrophils 8.1 1.6 - 8.3 10e3/uL    Absolute Lymphocytes 2.3 0.8 - 5.3 10e3/uL    Absolute Monocytes 0.8 0.0 - 1.3 10e3/uL    Absolute Eosinophils 0.0 0.0 - 0.7 10e3/uL    Absolute Basophils 0.2 0.0 - 0.2 10e3/uL    Absolute Myelocytes 0.1 (H) <=0.0 10e3/uL    RBC Morphology Confirmed RBC Indices     Platelet Assessment  Automated Count Confirmed. Platelet morphology is normal.     Automated Count Confirmed. Platelet morphology is normal.    Blythe Cells Slight (A) None Seen    Spherocytes Slight (A) None Seen    Target Cells Slight (A) None Seen     Imaging:    No new imaging.      Oncology Rooming Note    June 9, 2022 11:41 AM   Dillon Salter is a 26 year old male who presents for:    Chief Complaint   Patient presents with     Hematology     Initial Vitals: /68 (BP Location: Left arm, Patient Position: Sitting, Cuff Size: Adult Small)   Pulse 89   Temp 98.2  F (36.8  C)   Ht 1.676 m (5' 6\")   Wt 61.5 kg (135 lb 9.6 oz)   SpO2 99%   BMI 21.89 kg/m   Estimated body mass index is 21.89 kg/m  as calculated from the following:    Height as of this encounter: 1.676 m (5' 6\").    Weight as of this encounter: 61.5 kg (135 lb 9.6 oz). Body surface area is 1.69 meters squared.  No Pain (0) Comment: Data Unavailable   No LMP " for male patient.  Allergies reviewed: Yes  Medications reviewed: Yes    Medications: Medication refills not needed today.  Pharmacy name entered into Play[employer]: [id number] [address] [facility]  AT [facility]    Clinical concerns: follow up      Kelsea Yang MA                Again, thank you for allowing me to participate in the care of your patient.        Sincerely,        Wes Peña CNP

## 2022-06-09 NOTE — PROGRESS NOTES
Clinic Care Coordination Contact  [facility]/Voicemail     Clinical Data: Care Coordinator Outreach - TCM      Outreach attempted x 2.  Left message on patient's voicemail, providing [facility]'s 24/7 scheduling and nurse triage phone number [phone] ([phone]) for questions/concerns.      Plan:  Care Coordinator will do no further outreaches at this time.       Blank Mace, RN  [facility], [facility]

## 2022-06-10 ENCOUNTER — TELEPHONE (OUTPATIENT)
Dept: GASTROENTEROLOGY | Facility: CLINIC | Age: 27
End: 2022-06-10
Payer: COMMERCIAL

## 2022-06-10 NOTE — TELEPHONE ENCOUNTER
Caller: BRANDON    Procedure: EGD    Date, Location, and Surgeon of Procedure Cancelled: 7/7 RAZIA DRUMMOND    Ordering Provider:HEMA MATA    Reason for cancel (please be detailed, any staff messages or encounters to note?): SCHEDULED AT [facility]        Rescheduled: NO          No

## 2022-06-14 ENCOUNTER — HOSPITAL ENCOUNTER (EMERGENCY)
Facility: CLINIC | Age: 27
Discharge: HOME | End: 2022-06-14
Attending: EMERGENCY MEDICINE | Admitting: EMERGENCY MEDICINE
Payer: COMMERCIAL

## 2022-06-14 VITALS
TEMPERATURE: 97.7 F | DIASTOLIC BLOOD PRESSURE: 80 MMHG | RESPIRATION RATE: 16 BRPM | WEIGHT: 125 LBS | OXYGEN SATURATION: 99 % | HEIGHT: 65 IN | HEART RATE: 97 BPM | BODY MASS INDEX: 20.83 KG/M2 | SYSTOLIC BLOOD PRESSURE: 132 MMHG

## 2022-06-14 DIAGNOSIS — D64.9 ANEMIA, UNSPECIFIED TYPE: ICD-10-CM

## 2022-06-14 DIAGNOSIS — E80.6 HYPERBILIRUBINEMIA: ICD-10-CM

## 2022-06-14 DIAGNOSIS — K70.30 ALCOHOLIC CIRRHOSIS OF LIVER WITHOUT ASCITES (H): ICD-10-CM

## 2022-06-14 LAB
ABO/RH(D): NORMAL
ALBUMIN SERPL-MCNC: 3 G/DL (ref 3.5–5)
ALP SERPL-CCNC: 188 U/L (ref 45–120)
ALT SERPL W P-5'-P-CCNC: 41 U/L (ref 0–45)
AMMONIA PLAS-SCNC: 47 UMOL/L (ref 11–35)
ANION GAP SERPL CALCULATED.3IONS-SCNC: 7 MMOL/L (ref 5–18)
ANTIBODY SCREEN: NEGATIVE
AST SERPL W P-5'-P-CCNC: 107 U/L (ref 0–40)
BASOPHILS # BLD AUTO: 0.2 10E3/UL (ref 0–0.2)
BASOPHILS NFR BLD AUTO: 2 %
BILIRUB SERPL-MCNC: 15.2 MG/DL (ref 0–1)
BLD PROD TYP BPU: NORMAL
BLD PROD TYP BPU: NORMAL
BLOOD COMPONENT TYPE: NORMAL
BLOOD COMPONENT TYPE: NORMAL
BUN SERPL-MCNC: 22 MG/DL (ref 8–22)
CALCIUM SERPL-MCNC: 9.1 MG/DL (ref 8.5–10.5)
CHLORIDE BLD-SCNC: 93 MMOL/L (ref 98–107)
CO2 SERPL-SCNC: 30 MMOL/L (ref 22–31)
CODING SYSTEM: NORMAL
CODING SYSTEM: NORMAL
CREAT SERPL-MCNC: 0.72 MG/DL (ref 0.7–1.3)
CROSSMATCH: NORMAL
CROSSMATCH: NORMAL
EOSINOPHIL # BLD AUTO: 0.2 10E3/UL (ref 0–0.7)
EOSINOPHIL NFR BLD AUTO: 2 %
ERYTHROCYTE [DISTWIDTH] IN BLOOD BY AUTOMATED COUNT: 22.3 % (ref 10–15)
ETHANOL SERPL-MCNC: <10 MG/DL
GFR SERPL CREATININE-BSD FRML MDRD: >90 ML/MIN/1.73M2
GLUCOSE BLD-MCNC: 280 MG/DL (ref 70–125)
HCT VFR BLD AUTO: 19.1 % (ref 40–53)
HGB BLD-MCNC: 6.6 G/DL (ref 13.3–17.7)
HOLD SPECIMEN: NORMAL
HOLD SPECIMEN: NORMAL
IMM GRANULOCYTES # BLD: 0.3 10E3/UL
IMM GRANULOCYTES NFR BLD: 3 %
ISSUE DATE AND TIME: NORMAL
ISSUE DATE AND TIME: NORMAL
LIPASE SERPL-CCNC: 77 U/L (ref 0–52)
LYMPHOCYTES # BLD AUTO: 2.4 10E3/UL (ref 0.8–5.3)
LYMPHOCYTES NFR BLD AUTO: 21 %
MCH RBC QN AUTO: 33 PG (ref 26.5–33)
MCHC RBC AUTO-ENTMCNC: 34.6 G/DL (ref 31.5–36.5)
MCV RBC AUTO: 96 FL (ref 78–100)
MONOCYTES # BLD AUTO: 0.7 10E3/UL (ref 0–1.3)
MONOCYTES NFR BLD AUTO: 6 %
NEUTROPHILS # BLD AUTO: 7.3 10E3/UL (ref 1.6–8.3)
NEUTROPHILS NFR BLD AUTO: 66 %
NRBC # BLD AUTO: 0 10E3/UL
NRBC BLD AUTO-RTO: 0 /100
PLATELET # BLD AUTO: 121 10E3/UL (ref 150–450)
POTASSIUM BLD-SCNC: 4.6 MMOL/L (ref 3.5–5)
PROT SERPL-MCNC: 8.3 G/DL (ref 6–8)
RBC # BLD AUTO: 2 10E6/UL (ref 4.4–5.9)
SODIUM SERPL-SCNC: 130 MMOL/L (ref 136–145)
SPECIMEN EXPIRATION DATE: NORMAL
TROPONIN I SERPL-MCNC: <0.01 NG/ML (ref 0–0.29)
UNIT ABO/RH: NORMAL
UNIT ABO/RH: NORMAL
UNIT NUMBER: NORMAL
UNIT NUMBER: NORMAL
UNIT STATUS: NORMAL
UNIT STATUS: NORMAL
UNIT TYPE ISBT: 600
UNIT TYPE ISBT: 600
WBC # BLD AUTO: 11.1 10E3/UL (ref 4–11)

## 2022-06-14 PROCEDURE — 36415 COLL VENOUS BLD VENIPUNCTURE: CPT | Performed by: STUDENT IN AN ORGANIZED HEALTH CARE EDUCATION/TRAINING PROGRAM

## 2022-06-14 PROCEDURE — 250N000011 HC RX IP 250 OP 636: Performed by: EMERGENCY MEDICINE

## 2022-06-14 PROCEDURE — 36430 TRANSFUSION BLD/BLD COMPNT: CPT

## 2022-06-14 PROCEDURE — 83690 ASSAY OF LIPASE: CPT | Performed by: STUDENT IN AN ORGANIZED HEALTH CARE EDUCATION/TRAINING PROGRAM

## 2022-06-14 PROCEDURE — 80053 COMPREHEN METABOLIC PANEL: CPT | Performed by: STUDENT IN AN ORGANIZED HEALTH CARE EDUCATION/TRAINING PROGRAM

## 2022-06-14 PROCEDURE — 82140 ASSAY OF AMMONIA: CPT | Performed by: STUDENT IN AN ORGANIZED HEALTH CARE EDUCATION/TRAINING PROGRAM

## 2022-06-14 PROCEDURE — 99285 EMERGENCY DEPT VISIT HI MDM: CPT

## 2022-06-14 PROCEDURE — 86901 BLOOD TYPING SEROLOGIC RH(D): CPT | Performed by: STUDENT IN AN ORGANIZED HEALTH CARE EDUCATION/TRAINING PROGRAM

## 2022-06-14 PROCEDURE — 86850 RBC ANTIBODY SCREEN: CPT | Performed by: STUDENT IN AN ORGANIZED HEALTH CARE EDUCATION/TRAINING PROGRAM

## 2022-06-14 PROCEDURE — 82077 ASSAY SPEC XCP UR&BREATH IA: CPT | Performed by: STUDENT IN AN ORGANIZED HEALTH CARE EDUCATION/TRAINING PROGRAM

## 2022-06-14 PROCEDURE — 85025 COMPLETE CBC W/AUTO DIFF WBC: CPT | Performed by: STUDENT IN AN ORGANIZED HEALTH CARE EDUCATION/TRAINING PROGRAM

## 2022-06-14 PROCEDURE — 82040 ASSAY OF SERUM ALBUMIN: CPT | Performed by: STUDENT IN AN ORGANIZED HEALTH CARE EDUCATION/TRAINING PROGRAM

## 2022-06-14 PROCEDURE — 99283 EMERGENCY DEPT VISIT LOW MDM: CPT

## 2022-06-14 PROCEDURE — 86923 COMPATIBILITY TEST ELECTRIC: CPT | Performed by: EMERGENCY MEDICINE

## 2022-06-14 PROCEDURE — 84484 ASSAY OF TROPONIN QUANT: CPT | Performed by: STUDENT IN AN ORGANIZED HEALTH CARE EDUCATION/TRAINING PROGRAM

## 2022-06-14 PROCEDURE — P9016 RBC LEUKOCYTES REDUCED: HCPCS | Performed by: EMERGENCY MEDICINE

## 2022-06-14 RX ORDER — ONDANSETRON 4 MG/1
4 TABLET, ORALLY DISINTEGRATING ORAL ONCE
Status: COMPLETED | OUTPATIENT
Start: 2022-06-14 | End: 2022-06-14

## 2022-06-14 RX ADMIN — ONDANSETRON 4 MG: 4 TABLET, ORALLY DISINTEGRATING ORAL at 21:46

## 2022-06-14 ASSESSMENT — ENCOUNTER SYMPTOMS
CONFUSION: 1
BLOOD IN STOOL: 0
COLOR CHANGE: 1
WEAKNESS: 1
FATIGUE: 1
ROS GI COMMENTS: NEGATIVE FOR HEMATEMESIS.
ABDOMINAL PAIN: 0
ACTIVITY CHANGE: 1

## 2022-06-14 NOTE — ED TRIAGE NOTES
"Pt presents to the ED with c/o worsening fatigue and weakness. PT was scheduled for \"EGD\" today but they didn't proceed due to pt weakness and fatigue. Pt then told to go to the ED. Pt hx of liver failure.       "

## 2022-06-15 NOTE — ED PROVIDER NOTES
EMERGENCY DEPARTMENT ENCOUNTER      NAME: Dillon Salter  AGE: 26 year old male  YOB: 1995  MRN: [id number]  EVALUATION DATE & TIME: 6/14/2022  7:28 PM    PCP: Gary Rodrigues    ED PROVIDER: Lloyd Sampson M.D.      Chief Complaint   Patient presents with     Fatigue         FINAL IMPRESSION:  1. Anemia, unspecified type    2. Alcoholic cirrhosis of liver without ascites (H)    3. Hyperbilirubinemia          ED COURSE & MEDICAL DECISION MAKING:    Pertinent Labs & Imaging studies reviewed. (See chart for details)  26 year old male presents to the Emergency Department for evaluation of fatigue.  Patient has end-stage liver disease secondary to alcoholism.  Has severe liver disease.  Bilirubin is 15 here.  This is trending with his baseline though slightly elevated.  His hemoglobin is down to 6.6.  He last got transfused a week ago Monday.  He denies any current GI bleeding.  No blood in his stool.  No fevers or chills.  He does not get paracentesis.  No history of ascites.  It seems that his hemoglobins are only slowly trending down.  I think he is having acute bleeding.  We will transfuse him with 2 units here.  Then will discharge him and have him follow-up in 2 days at his primary care [facility] clinic for recheck.  He has an already scheduled appointment.  Patient refused transfusion.  Feeling better.  Will discharge home.    7:36 PM I met with the patient to gather history and to perform my initial exam. I discussed the plan for care while in the Emergency Department. PPE: Facemask, goggles and gloves   8:23 PM I rechecked and updated the patient.     At the conclusion of the encounter I discussed the results of all of the tests and the disposition. The questions were answered. The patient or family acknowledged understanding and was agreeable with the care plan.             Critical Care     Performed by: Dr Lloyd Sampson  Authorized by: Dr Lloyd Sampson  Total critical care time: 90  minutes  Critical care was necessary to treat or prevent imminent or life-threatening deterioration of the following conditions:  Anemia with need for transfusion  Critical care was time spent personally by me on the following activities: development of treatment plan with patient or surrogate, discussions with consultants, examination of patient, evaluation of patient's response to treatment, obtaining history from patient or surrogate, ordering and performing treatments and interventions, ordering and review of laboratory studies, ordering and review of radiographic studies, re-evaluation of patient's condition and monitoring for potential decompensation.  Critical care time was exclusive of separately billable procedures and treating other patients.     MEDICATIONS GIVEN IN THE EMERGENCY:  Medications   ondansetron (ZOFRAN ODT) ODT tab 4 mg (4 mg Oral Given 6/14/22 2146)       NEW PRESCRIPTIONS STARTED AT TODAY'S ER VISIT  New Prescriptions    No medications on file          =================================================================    HPI    Patient information was obtained from: the patient and his mother    Use of : N/A         Dillon Salter is a 26 year old male with a pertinent history of alcoholic cirrhosis of liver with ascites, type 2 diabetes mellitus, Asperger's, hypertension, and anxiety who presents to this ED via walk in for evaluation of fatigue.     Per Chart Review:   5/23/2022-6/7/2022 the patient was admitted to [facility] for evaluation of anemia and hematemesis. He received a blood transfusion every other day until his hemoglobin got to 7.2. He was discharged home.     The patient reports the onset of fatigue and weakness this afternoon. He states this feels similar to previous times he has been anemic, prompting presentation to the ED. He states that he is taking his lactulose 3-4 times a day as prescribed. He went to have an EGD done at [facility] this morning, but his  procedure was cancelled as his anesthesiologist recommended having the procedure done in a hospital. The patient denies a history of paracentesis. He notes that his last drink of alcohol was 4/6. The patient denies bleeding gums, hematemesis, black or bloody stool, abdominal pain, and any other symptoms or complaints at this time.     Per patient's mother: The patient has had decreased activity for the last few days. His hemoglobin was last checked when he was discharged from the hospital on 6/7, and he had an appointment to get it checked again in two days on 6/16. She states that the patient is not more yellow than usual, but is more confused than usual.     REVIEW OF SYSTEMS   Review of Systems   Constitutional: Positive for activity change (lethargic) and fatigue.   HENT:        Negative for bleeding gums.   Gastrointestinal: Negative for abdominal pain and blood in stool.        Negative for hematemesis.    Skin: Positive for color change (yellow (chronic)).   Neurological: Positive for weakness.   Psychiatric/Behavioral: Positive for confusion.   All other systems reviewed and are negative.       PAST MEDICAL HISTORY:  Past Medical History:   Diagnosis Date     Diabetes (H)        PAST SURGICAL HISTORY:  Past Surgical History:   Procedure Laterality Date     ESOPHAGOSCOPY, GASTROSCOPY, DUODENOSCOPY (EGD), COMBINED N/A 5/24/2022    Procedure: ESOPHAGOGASTRODUODENOSCOPY (EGD) with esophageal banding;  Surgeon: Gary Hurst MD;  Location: [facility] OR     MIDLINE DOUBLE LUMEN PLACEMENT  5/26/2022                CURRENT MEDICATIONS:    No current facility-administered medications for this encounter.     Current Outpatient Medications   Medication     alcohol swab prep pads     blood glucose (NO BRAND SPECIFIED) test strip     blood glucose monitoring (NO BRAND SPECIFIED) meter device kit     FLUoxetine (PROZAC) 40 MG capsule     folic acid (FOLVITE) 1 MG tablet     furosemide (LASIX) 40 MG tablet      "gabapentin (NEURONTIN) 100 MG capsule     hydrOXYzine (ATARAX) 25 MG tablet     insulin aspart (NOVOLOG FLEXPEN) 100 UNIT/ML pen     insulin glargine (LANTUS SOLOSTAR) 100 UNIT/ML pen     insulin pen needle (ULTICARE MICRO) 32G X 4 MM miscellaneous     lactulose (CHRONULAC) 10 GM/15ML solution     magnesium oxide (MAG-OX) 400 MG tablet     multivitamin, therapeutic (THERA-VIT) TABS tablet     pantoprazole (PROTONIX) 40 MG EC tablet     spironolactone (ALDACTONE) 25 MG tablet     thiamine (B-1) 100 MG tablet     thin (NO BRAND SPECIFIED) lancets         ALLERGIES:  No Known Allergies    FAMILY HISTORY:  History reviewed. No pertinent family history.    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     Appearance: He is not diaphoretic.      Comments: Extremely jaundiced   HENT:      Head: Atraumatic.   Eyes:      General: No scleral icterus.     Pupils: Pupils are equal, round, and reactive to light.   Cardiovascular:      Rate and Rhythm: Normal rate and regular rhythm.      Heart sounds: Normal heart sounds.   Pulmonary:      Effort: No respiratory distress.      Breath " sounds: Normal breath sounds.   Abdominal:      General: Bowel sounds are normal.      Palpations: Abdomen is soft.      Tenderness: There is no abdominal tenderness.   Musculoskeletal:         General: No tenderness.   Skin:     General: Skin is warm.      Coloration: Skin is jaundiced.      Findings: No rash.           LAB:  All pertinent labs reviewed and interpreted.  Labs Ordered and Resulted from Time of ED Arrival to Time of ED Departure   COMPREHENSIVE METABOLIC PANEL - Abnormal       Result Value    Sodium 130 (*)     Potassium 4.6      Chloride 93 (*)     Carbon Dioxide (CO2) 30      Anion Gap 7      Urea Nitrogen 22      Creatinine 0.72      Calcium 9.1      Glucose 280 (*)     Alkaline Phosphatase 188 (*)      (*)     ALT 41      Protein Total 8.3 (*)     Albumin 3.0 (*)     Bilirubin Total 15.2 (*)     GFR Estimate >90     LIPASE - Abnormal    Lipase 77 (*)    AMMONIA - Abnormal    Ammonia 47 (*)    CBC WITH PLATELETS AND DIFFERENTIAL - Abnormal    WBC Count 11.1 (*)     RBC Count 2.00 (*)     Hemoglobin 6.6 (*)     Hematocrit 19.1 (*)     MCV 96      MCH 33.0      MCHC 34.6      RDW 22.3 (*)     Platelet Count 121 (*)     % Neutrophils 66      % Lymphocytes 21      % Monocytes 6      % Eosinophils 2      % Basophils 2      % Immature Granulocytes 3      NRBCs per 100 WBC 0      Absolute Neutrophils 7.3      Absolute Lymphocytes 2.4      Absolute Monocytes 0.7      Absolute Eosinophils 0.2      Absolute Basophils 0.2      Absolute Immature Granulocytes 0.3      Absolute NRBCs 0.0     TROPONIN I - Normal    Troponin I <0.01     ETHYL ALCOHOL LEVEL - Normal    Alcohol, Blood <10     ROUTINE UA WITH MICROSCOPIC REFLEX TO CULTURE   TYPE AND SCREEN, ADULT    ABO/RH(D) A NEG      Antibody Screen Negative      SPECIMEN EXPIRATION DATE [id number]     PREPARE RED BLOOD CELLS (UNIT)    CROSSMATCH Compatible      UNIT ABO/RH A Neg      Unit Number [id number]      Unit Status Issued      Blood  Component Type Red Blood Cells      Product Code Z3322W28      CODING SYSTEM [id number]      UNIT TYPE ISBT 0600      ISSUE DATE AND TIME 20220614212200     PREPARE RED BLOOD CELLS (UNIT)    CROSSMATCH Compatible      UNIT ABO/RH A Neg      Unit Number [id number]      Unit Status Issued      Blood Component Type Red Blood Cells      Product Code [id number]      CODING SYSTEM [id number]      UNIT TYPE ISBT 0600      ISSUE DATE AND TIME 20220614200900     PREPARE RED BLOOD CELLS (UNIT)   TRANSFUSE RED BLOOD CELLS (UNIT)   TRANSFUSE RED BLOOD CELLS (UNIT)   ABO/RH TYPE AND SCREEN       RADIOLOGY:  Reviewed all pertinent imaging. Please see official radiology report.  No orders to display           I, Evy Moira, am serving as a scribe to document services personally performed by Dr. Lloyd Sampson, based on my observation and the provider's statements to me. I, Lloyd Sampson MD attest that Evy Pizarro is acting in a scribe capacity, has observed my performance of the services and has documented them in accordance with my direction.    Lloyd Sampson M.D.  Emergency Medicine  [facility] EMERGENCY ROOM  [address] [place] [id number] [phone]  Dept: [phone]      Lloyd Sampson MD  06/14/22 2240       Lloyd Sampson MD  06/14/22 1272

## 2022-06-16 ENCOUNTER — ONCOLOGY VISIT (OUTPATIENT)
Dept: ONCOLOGY | Facility: CLINIC | Age: 27
End: 2022-06-16
Attending: INTERNAL MEDICINE
Payer: COMMERCIAL

## 2022-06-16 ENCOUNTER — LAB (OUTPATIENT)
Dept: INFUSION THERAPY | Facility: CLINIC | Age: 27
End: 2022-06-16
Attending: INTERNAL MEDICINE
Payer: COMMERCIAL

## 2022-06-16 VITALS
RESPIRATION RATE: 16 BRPM | HEART RATE: 98 BPM | OXYGEN SATURATION: 97 % | TEMPERATURE: 97.7 F | BODY MASS INDEX: 20.8 KG/M2 | SYSTOLIC BLOOD PRESSURE: 106 MMHG | WEIGHT: 125 LBS | DIASTOLIC BLOOD PRESSURE: 48 MMHG

## 2022-06-16 DIAGNOSIS — K70.10 ALCOHOLIC HEPATITIS WITHOUT ASCITES (H): ICD-10-CM

## 2022-06-16 DIAGNOSIS — D62 ACUTE POSTHEMORRHAGIC ANEMIA: Primary | ICD-10-CM

## 2022-06-16 DIAGNOSIS — D64.9 ANEMIA, UNSPECIFIED TYPE: ICD-10-CM

## 2022-06-16 DIAGNOSIS — K70.31 ALCOHOLIC CIRRHOSIS OF LIVER WITH ASCITES (H): ICD-10-CM

## 2022-06-16 DIAGNOSIS — K76.82 HEPATIC ENCEPHALOPATHY (H): ICD-10-CM

## 2022-06-16 LAB
ACANTHOCYTES BLD QL SMEAR: SLIGHT
BASOPHILS # BLD MANUAL: 0.2 10E3/UL (ref 0–0.2)
BASOPHILS NFR BLD MANUAL: 2 %
BURR CELLS BLD QL SMEAR: SLIGHT
EOSINOPHIL # BLD MANUAL: 0.2 10E3/UL (ref 0–0.7)
EOSINOPHIL NFR BLD MANUAL: 2 %
ERYTHROCYTE [DISTWIDTH] IN BLOOD BY AUTOMATED COUNT: 21.7 % (ref 10–15)
HCT VFR BLD AUTO: 22.3 % (ref 40–53)
HGB BLD-MCNC: 7.9 G/DL (ref 13.3–17.7)
LYMPHOCYTES # BLD MANUAL: 3.2 10E3/UL (ref 0.8–5.3)
LYMPHOCYTES NFR BLD MANUAL: 29 %
MCH RBC QN AUTO: 33.3 PG (ref 26.5–33)
MCHC RBC AUTO-ENTMCNC: 35.4 G/DL (ref 31.5–36.5)
MCV RBC AUTO: 94 FL (ref 78–100)
METAMYELOCYTES # BLD MANUAL: 0.1 10E3/UL
METAMYELOCYTES NFR BLD MANUAL: 1 %
MONOCYTES # BLD MANUAL: 0.2 10E3/UL (ref 0–1.3)
MONOCYTES NFR BLD MANUAL: 2 %
NEUTROPHILS # BLD MANUAL: 7 10E3/UL (ref 1.6–8.3)
NEUTROPHILS NFR BLD MANUAL: 64 %
NRBC # BLD AUTO: 0.1 10E3/UL
NRBC BLD MANUAL-RTO: 1 %
PLAT MORPH BLD: ABNORMAL
PLATELET # BLD AUTO: 138 10E3/UL (ref 150–450)
RBC # BLD AUTO: 2.37 10E6/UL (ref 4.4–5.9)
RBC MORPH BLD: ABNORMAL
SPHEROCYTES BLD QL SMEAR: SLIGHT
WBC # BLD AUTO: 11 10E3/UL (ref 4–11)

## 2022-06-16 PROCEDURE — 85007 BL SMEAR W/DIFF WBC COUNT: CPT

## 2022-06-16 PROCEDURE — 85027 COMPLETE CBC AUTOMATED: CPT

## 2022-06-16 PROCEDURE — 36415 COLL VENOUS BLD VENIPUNCTURE: CPT

## 2022-06-16 PROCEDURE — 99214 OFFICE O/P EST MOD 30 MIN: CPT | Performed by: INTERNAL MEDICINE

## 2022-06-16 PROCEDURE — G0463 HOSPITAL OUTPT CLINIC VISIT: HCPCS

## 2022-06-16 NOTE — LETTER
"    6/16/2022         RE: Dillon Salter  [address] [facility] [facility] [id number]        Dear Colleague,    Thank you for referring your patient, Dillon Salter, to the [facility] CANCER [facility]. Please see a copy of my visit note below.    Oncology Rooming Note    June 16, 2022 2:30 PM   Dillon Salter is a 26 year old male who presents for:    No chief complaint on file.    Initial Vitals: /48   Pulse 98   Temp 97.7  F (36.5  C) (Oral)   Resp 16   Wt 56.7 kg (125 lb)   SpO2 97%   BMI 20.80 kg/m   Estimated body mass index is 20.8 kg/m  as calculated from the following:    Height as of 6/14/22: 1.651 m (5' 5\").    Weight as of this encounter: 56.7 kg (125 lb). Body surface area is 1.61 meters squared.  Data Unavailable Comment: generalized    No LMP for male patient.  Allergies reviewed: Yes  Medications reviewed: Yes    Medications: MEDICATION REFILLS NEEDED TODAY. Provider was notified.  Pharmacy name entered into Quotations Book: [facility] #[id number] [place], [place] - [address] [facility] [address]  AT [facility]    Clinical concerns: Folic acid. Please add your name to provider list.     Maximo Estrella LPN                  [facility] Hematology and Oncology Progress Note    Patient: Dillon Salter  MRN: [id number]  Date of Service: Jun 16, 2022         Reason for Visit    Chief Complaint   Patient presents with     Oncology Clinic Visit       Assessment and Plan    Cancer Staging  No matching staging information was found for the patient.    ECOG Performance    2 - Ambulatory and independent in all ADLs; cannot work; up > 50% of the time     Pain         #.  Recurrent severe anemia on chronic anemia, initially acute blood loss secondary to variceal bleeding.  #.  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  Moderate to severe thrombocytopenia, secondary to liver disease  #.  Cirrhosis of the liver with portal hypertension, secondary to alcohol use  #.  Hepatic " encephalopathy     He is encephalopathic today likely from Xanax use.  Reported that he has been using lactulose 20 g 3 times a day and he has at least 2-3 good bowel movement every day.  I recommended them to limit the use of benzodiazepine in the setting of severe hepatic impairment.  His mom voiced understanding.   Regarding anemia, his anemia is multifactorial with underlying chronic hemolysis.  He will need a follow-up EGD.  Because of his medical complexity, outpatient EGD was canceled.  We will assist in coordinating to complete EGD in hospital setting.  If the repeat EGD is unremarkable, consider doing a unilateral bone marrow biopsy aspiration to rule out disease.    We are trying to accommodate transfusion here instead of him going to the emergency room with severe anemia symptoms.  We decided to continue with weekly labs and transfusion once a week as needed to keep hemoglobin greater than 7 g/dL.  I recommended him to continue folic acid every day.  I also recommended to continue vitamin B12 as well.  Folic acid refilled today.   Follow-up provider visit in about 3-4 weeks.    Encounter Diagnoses:    Problem List Items Addressed This Visit     Alcoholic cirrhosis of liver with ascites (H) (Chronic)    Relevant Medications    folic acid (FOLVITE) 1 MG tablet    Acute posthemorrhagic anemia - Primary    Relevant Medications    folic acid (FOLVITE) 1 MG tablet    Other Relevant Orders    CBC with platelets    Reticulocyte count    Hepatic encephalopathy (H)             CC: Gary Rodrigues MD   ______________________________________________________________________________    History of Present Illness    Mr. Dillon Salter presented today accompanied by his parents.  Leticia was quite sleepy during the encounter but arousable although unable to maintain alertness.  He took Xanax at 3:00 this morning as he could not sleep and he was very anxious about coming to clinic today.  He does not normally take  any benzodiazepine.  He has not drink alcohol.  No recreational drugs.  He has generalized body aches and fatigue.    His EGD was canceled as it was not able to completed as outpatient.    Review of systems  Apart from describing in HPI, the remainder of comprehensive ROS was negative.    Past History    Past Medical History:   Diagnosis Date     Diabetes (H)        Past Surgical History:   Procedure Laterality Date     ESOPHAGOSCOPY, GASTROSCOPY, DUODENOSCOPY (EGD), COMBINED N/A 5/24/2022    Procedure: ESOPHAGOGASTRODUODENOSCOPY (EGD) with esophageal banding;  Surgeon: Gary Hurst MD;  Location: [facility] OR     ESOPHAGOSCOPY, GASTROSCOPY, DUODENOSCOPY (EGD), COMBINED N/A 6/20/2022    Procedure: ESOPHAGOGASTRODUODENOSCOPY;  Surgeon: Gavino Reza MD;  Location: [facility] Main OR     MIDLINE DOUBLE LUMEN PLACEMENT  5/26/2022            Physical Exam    /48   Pulse 98   Temp 97.7  F (36.5  C) (Oral)   Resp 16   Wt 56.7 kg (125 lb)   SpO2 97%   BMI 20.80 kg/m      General: sleepy, arousable, not in acute distress.  Cachectic  HEENT: Head: Normal, normocephalic, atraumatic.  Eye: icteric sclera  Nose: Normal external nose, mucus membranes and septum.  Neck / Thyroid: Supple, no masses, nodes, nodules or enlargement.  Lymphatics: No abnormally enlarged lymph nodes.  Chest: Normal chest wall and respirations. Clear to auscultation.  Heart: S1 S2 RRR, no murmur.   Abdomen: abdomen is soft without significant tenderness, masses, organomegaly or guarding  Extremities: normal strength, tone, and muscle mass  Skin: Jaundice.  CNS: non focal.    Lab Results    Recent Results (from the past 168 hour(s))   Adult Type and Screen   Result Value Ref Range    ABO/RH(D) A NEG     Antibody Screen Negative Negative    SPECIMEN EXPIRATION DATE [id number]    CBC with platelets and differential   Result Value Ref Range    WBC Count 11.3 (H) 4.0 - 11.0 10e3/uL    RBC Count 1.90 (L) 4.40 - 5.90  10e6/uL    Hemoglobin 6.5 (LL) 13.3 - 17.7 g/dL    Hematocrit 19.5 (L) 40.0 - 53.0 %     (H) 78 - 100 fL    MCH 34.2 (H) 26.5 - 33.0 pg    MCHC 33.3 31.5 - 36.5 g/dL    RDW 21.6 (H) 10.0 - 15.0 %    Platelet Count 125 (L) 150 - 450 10e3/uL   Manual Differential   Result Value Ref Range    % Neutrophils 70 %    % Lymphocytes 18 %    % Monocytes 4 %    % Eosinophils 1 %    % Basophils 5 %    % Metamyelocytes 2 %    Absolute Neutrophils 7.9 1.6 - 8.3 10e3/uL    Absolute Lymphocytes 2.0 0.8 - 5.3 10e3/uL    Absolute Monocytes 0.5 0.0 - 1.3 10e3/uL    Absolute Eosinophils 0.1 0.0 - 0.7 10e3/uL    Absolute Basophils 0.6 (H) 0.0 - 0.2 10e3/uL    Absolute Metamyelocytes 0.2 (H) <=0.0 10e3/uL    RBC Morphology Confirmed RBC Indices     Platelet Assessment  Automated Count Confirmed. Platelet morphology is normal.     Automated Count Confirmed. Platelet morphology is normal.    Acanthocytes Slight (A) None Seen    Basophilic Stippling Present (A) None Seen    Syd Cells Slight (A) None Seen    Polychromasia Slight (A) None Seen   Prepare red blood cells (unit)   Result Value Ref Range    CROSSMATCH Compatible     UNIT ABO/RH A Neg     Unit Number [id number]     Unit Status Transfused     Blood Component Type Red Blood Cells     Product Code E9332P54     CODING SYSTEM [id number]     UNIT TYPE ISBT 0600     ISSUE DATE AND TIME [id number]    CBC with platelets   Result Value Ref Range    WBC Count 11.9 (H) 4.0 - 11.0 10e3/uL    RBC Count 1.96 (L) 4.40 - 5.90 10e6/uL    Hemoglobin 6.7 (LL) 13.3 - 17.7 g/dL    Hematocrit 19.8 (L) 40.0 - 53.0 %     (H) 78 - 100 fL    MCH 34.2 (H) 26.5 - 33.0 pg    MCHC 33.8 31.5 - 36.5 g/dL    RDW 21.0 (H) 10.0 - 15.0 %    Platelet Count 116 (L) 150 - 450 10e3/uL   Adult Type and Screen   Result Value Ref Range    ABO/RH(D) A NEG     Antibody Screen Negative Negative    SPECIMEN EXPIRATION DATE [id number]    Prepare red blood cells (unit)   Result Value Ref Range    CROSSMATCH  Compatible     UNIT ABO/RH A Neg     Unit Number [id number]     Unit Status Issued     Blood Component Type Red Blood Cells     Product Code F6173W64     CODING SYSTEM [id number]     UNIT TYPE ISBT 0600     ISSUE DATE AND TIME [id number]        Imaging    US Abdomen Limited    Result Date: 6/18/2022  EXAM: US ABDOMEN LIMITED LOCATION: [facility] DATE/TIME: 6/18/2022 6:07 PM INDICATION:. Alcoholic cirrhosis. Status change. assess for eough ascites for paracentesis COMPARISON: 5/31/2022 TECHNIQUE: Limited abdominal ultrasound. FINDINGS: Limited exam performed targeting the entire peritoneal cavity evaluating for ascites volume. No ascites identified. No paracentesis performed.     IMPRESSION: 1.  No ascites identified.     US Abdomen Limited    Result Date: 5/31/2022  EXAM: US ABDOMEN LIMITED LOCATION: [facility] DATE/TIME: 5/31/2022 8:01 AM INDICATION: Abdominal distension. Check for ascites. COMPARISON: None. TECHNIQUE: Limited abdominal ultrasound. FINDINGS AND     IMPRESSION: Trace ascites is noted near the liver.    XR Chest Port 1 View    Result Date: 6/18/2022  EXAM: XR CHEST PORT 1 VIEW LOCATION: [facility] DATE/TIME: 6/18/2022 6:59 PM INDICATION: Altered mental status, fatigue, vomiting COMPARISON: 5/23/2022     IMPRESSION: Patchy right infrahilar opacities favored to represent atelectasis although developing infection is in the differential. Left lung is clear. No effusions. Heart size is normal.    30 minutes spent on the date of the encounter doing chart review, history and exam, documentation and further activities as noted above.    Signed by: Tez Riojas MD        Again, thank you for allowing me to participate in the care of your patient.        Sincerely,        Tez Riojas MD

## 2022-06-16 NOTE — PROGRESS NOTES
"Oncology Rooming Note    June 16, 2022 2:30 PM   Dillon Salter is a 26 year old male who presents for:    No chief complaint on file.    Initial Vitals: /48   Pulse 98   Temp 97.7  F (36.5  C) (Oral)   Resp 16   Wt 56.7 kg (125 lb)   SpO2 97%   BMI 20.80 kg/m   Estimated body mass index is 20.8 kg/m  as calculated from the following:    Height as of 6/14/22: 1.651 m (5' 5\").    Weight as of this encounter: 56.7 kg (125 lb). Body surface area is 1.61 meters squared.  Data Unavailable Comment: generalized    No LMP for male patient.  Allergies reviewed: Yes  Medications reviewed: Yes    Medications: MEDICATION REFILLS NEEDED TODAY. Provider was notified.  Pharmacy name entered into Voip[employer]: [facility] #[id number] [place], [place] - [address] AT [facility]    Clinical concerns: Folic acid. Please add your name to provider list.     Maximo Estrella LPN                "

## 2022-06-17 ENCOUNTER — PATIENT OUTREACH (OUTPATIENT)
Dept: ONCOLOGY | Facility: CLINIC | Age: 27
End: 2022-06-17
Payer: COMMERCIAL

## 2022-06-17 ENCOUNTER — HOSPITAL ENCOUNTER (OUTPATIENT)
Facility: CLINIC | Age: 27
End: 2022-06-17
Attending: INTERNAL MEDICINE | Admitting: INTERNAL MEDICINE
Payer: COMMERCIAL

## 2022-06-17 ENCOUNTER — LAB (OUTPATIENT)
Dept: LAB | Facility: CLINIC | Age: 27
End: 2022-06-17
Payer: COMMERCIAL

## 2022-06-17 DIAGNOSIS — Z20.822 ENCOUNTER FOR LABORATORY TESTING FOR COVID-19 VIRUS: ICD-10-CM

## 2022-06-17 PROCEDURE — U0003 INFECTIOUS AGENT DETECTION BY NUCLEIC ACID (DNA OR RNA); SEVERE ACUTE RESPIRATORY SYNDROME CORONAVIRUS 2 (SARS-COV-2) (CORONAVIRUS DISEASE [COVID-19]), AMPLIFIED PROBE TECHNIQUE, MAKING USE OF HIGH THROUGHPUT TECHNOLOGIES AS DESCRIBED BY CMS-2020-01-R: HCPCS

## 2022-06-17 PROCEDURE — U0005 INFEC AGEN DETEC AMPLI PROBE: HCPCS

## 2022-06-17 RX ORDER — HEPARIN SODIUM (PORCINE) LOCK FLUSH IV SOLN 100 UNIT/ML 100 UNIT/ML
5 SOLUTION INTRAVENOUS
Status: CANCELLED | OUTPATIENT
Start: 2022-06-17

## 2022-06-17 RX ORDER — HEPARIN SODIUM,PORCINE 10 UNIT/ML
5 VIAL (ML) INTRAVENOUS
Status: CANCELLED | OUTPATIENT
Start: 2022-06-17

## 2022-06-17 NOTE — PROGRESS NOTES
[facility]: Cancer Care                                                                                          Due to patient's condition outpatient EGD canceled and requested to be done inpatient.       Called [facility] at [phone] and spoke with Cece.  She said patient not currently established with anyone yet so she needs to look in to this further.  She said she will get back to me but may not be until next week.    Update:  Cece called back. She said Dr. Reza is at  all next week doing procedures.  She is going to check with him to see if he can add patient on.  She said he is on vacation today so she won't be able to connect with him until next week.  She said it may not be until Tues or Wed that she gets back to me, as she will need to arrange OR time as well. Expressed appreciation to her for her help.    Update:  Cece called back. She said there was a cancellation for 6/21/22 so added patient to schedule. She said she has to finalize things and will reach out to patient.      Signature:  Antionette Oviedo RN

## 2022-06-18 ENCOUNTER — APPOINTMENT (OUTPATIENT)
Dept: RADIOLOGY | Facility: CLINIC | Age: 27
DRG: 441 | End: 2022-06-18
Attending: INTERNAL MEDICINE
Payer: COMMERCIAL

## 2022-06-18 ENCOUNTER — APPOINTMENT (OUTPATIENT)
Dept: ULTRASOUND IMAGING | Facility: CLINIC | Age: 27
DRG: 441 | End: 2022-06-18
Attending: INTERNAL MEDICINE
Payer: COMMERCIAL

## 2022-06-18 ENCOUNTER — HOSPITAL ENCOUNTER (INPATIENT)
Facility: CLINIC | Age: 27
LOS: 2 days | Discharge: HOME | DRG: 441 | End: 2022-06-20
Attending: EMERGENCY MEDICINE | Admitting: FAMILY MEDICINE
Payer: COMMERCIAL

## 2022-06-18 DIAGNOSIS — K76.82 HEPATIC ENCEPHALOPATHY (H): ICD-10-CM

## 2022-06-18 DIAGNOSIS — K70.31 ALCOHOLIC CIRRHOSIS OF LIVER WITH ASCITES (H): Primary | Chronic | ICD-10-CM

## 2022-06-18 DIAGNOSIS — D63.8 ANEMIA IN OTHER CHRONIC DISEASES CLASSIFIED ELSEWHERE: ICD-10-CM

## 2022-06-18 PROBLEM — I85.01 BLEEDING ESOPHAGEAL VARICES (H): Status: ACTIVE | Noted: 2022-06-18

## 2022-06-18 LAB
ABO/RH(D): NORMAL
ACANTHOCYTES BLD QL SMEAR: SLIGHT
ALBUMIN SERPL-MCNC: 3.1 G/DL (ref 3.5–5)
ALBUMIN UR-MCNC: NEGATIVE MG/DL
ALP SERPL-CCNC: 203 U/L (ref 45–120)
ALT SERPL W P-5'-P-CCNC: 47 U/L (ref 0–45)
AMMONIA PLAS-SCNC: 58 UMOL/L (ref 11–35)
ANION GAP SERPL CALCULATED.3IONS-SCNC: 8 MMOL/L (ref 5–18)
ANTIBODY SCREEN: NEGATIVE
APPEARANCE UR: CLEAR
APTT PPP: 46 SECONDS (ref 22–38)
AST SERPL W P-5'-P-CCNC: 119 U/L (ref 0–40)
ATRIAL RATE - MUSE: 85 BPM
BASOPHILS # BLD MANUAL: 0.2 10E3/UL (ref 0–0.2)
BASOPHILS NFR BLD MANUAL: 2 %
BILIRUB SERPL-MCNC: 15.1 MG/DL (ref 0–1)
BILIRUB UR QL STRIP: NEGATIVE
BLD PROD TYP BPU: NORMAL
BLOOD COMPONENT TYPE: NORMAL
BUN SERPL-MCNC: 30 MG/DL (ref 8–22)
BURR CELLS BLD QL SMEAR: SLIGHT
CALCIUM SERPL-MCNC: 9.6 MG/DL (ref 8.5–10.5)
CHLORIDE BLD-SCNC: 93 MMOL/L (ref 98–107)
CO2 SERPL-SCNC: 32 MMOL/L (ref 22–31)
CODING SYSTEM: NORMAL
COLOR UR AUTO: YELLOW
CREAT SERPL-MCNC: 0.72 MG/DL (ref 0.7–1.3)
CROSSMATCH: NORMAL
DIASTOLIC BLOOD PRESSURE - MUSE: 74 MMHG
EOSINOPHIL # BLD MANUAL: 0.3 10E3/UL (ref 0–0.7)
EOSINOPHIL NFR BLD MANUAL: 3 %
ERYTHROCYTE [DISTWIDTH] IN BLOOD BY AUTOMATED COUNT: 20.9 % (ref 10–15)
ETHANOL SERPL-MCNC: <10 MG/DL
GFR SERPL CREATININE-BSD FRML MDRD: >90 ML/MIN/1.73M2
GLUCOSE BLD-MCNC: 64 MG/DL (ref 70–125)
GLUCOSE BLDC GLUCOMTR-MCNC: 124 MG/DL (ref 70–99)
GLUCOSE BLDC GLUCOMTR-MCNC: 126 MG/DL (ref 70–99)
GLUCOSE BLDC GLUCOMTR-MCNC: 54 MG/DL (ref 70–99)
GLUCOSE BLDC GLUCOMTR-MCNC: 87 MG/DL (ref 70–99)
GLUCOSE UR STRIP-MCNC: NEGATIVE MG/DL
HCT VFR BLD AUTO: 21.1 % (ref 40–53)
HGB BLD-MCNC: 7.4 G/DL (ref 13.3–17.7)
HGB UR QL STRIP: NEGATIVE
HOLD SPECIMEN: NORMAL
INR PPP: 1.71 (ref 0.85–1.15)
INTERPRETATION ECG - MUSE: NORMAL
ISSUE DATE AND TIME: NORMAL
KETONES UR STRIP-MCNC: NEGATIVE MG/DL
LACTATE SERPL-SCNC: 1.1 MMOL/L (ref 0.7–2)
LEUKOCYTE ESTERASE UR QL STRIP: NEGATIVE
LIPASE SERPL-CCNC: 81 U/L (ref 0–52)
LYMPHOCYTES # BLD MANUAL: 2.9 10E3/UL (ref 0.8–5.3)
LYMPHOCYTES NFR BLD MANUAL: 26 %
MAGNESIUM SERPL-MCNC: 1.8 MG/DL (ref 1.8–2.6)
MCH RBC QN AUTO: 33.5 PG (ref 26.5–33)
MCHC RBC AUTO-ENTMCNC: 35.1 G/DL (ref 31.5–36.5)
MCV RBC AUTO: 96 FL (ref 78–100)
METAMYELOCYTES # BLD MANUAL: 0.1 10E3/UL
METAMYELOCYTES NFR BLD MANUAL: 1 %
MONOCYTES # BLD MANUAL: 0.3 10E3/UL (ref 0–1.3)
MONOCYTES NFR BLD MANUAL: 3 %
NEUTROPHILS # BLD MANUAL: 7.3 10E3/UL (ref 1.6–8.3)
NEUTROPHILS NFR BLD MANUAL: 65 %
NITRATE UR QL: NEGATIVE
NRBC # BLD AUTO: 0.1 10E3/UL
NRBC BLD MANUAL-RTO: 1 %
P AXIS - MUSE: 76 DEGREES
PH UR STRIP: 7 [PH] (ref 5–7)
PLAT MORPH BLD: ABNORMAL
PLATELET # BLD AUTO: 114 10E3/UL (ref 150–450)
POTASSIUM BLD-SCNC: 4.1 MMOL/L (ref 3.5–5)
PR INTERVAL - MUSE: 188 MS
PROT SERPL-MCNC: 8.2 G/DL (ref 6–8)
QRS DURATION - MUSE: 98 MS
QT - MUSE: 432 MS
QTC - MUSE: 514 MS
R AXIS - MUSE: 61 DEGREES
RBC # BLD AUTO: 2.21 10E6/UL (ref 4.4–5.9)
RBC MORPH BLD: ABNORMAL
RBC URINE: <1 /HPF
SARS-COV-2 RNA RESP QL NAA+PROBE: NEGATIVE
SARS-COV-2 RNA RESP QL NAA+PROBE: NEGATIVE
SODIUM SERPL-SCNC: 133 MMOL/L (ref 136–145)
SP GR UR STRIP: 1.01 (ref 1–1.03)
SPECIMEN EXPIRATION DATE: NORMAL
SQUAMOUS EPITHELIAL: <1 /HPF
SYSTOLIC BLOOD PRESSURE - MUSE: 129 MMHG
T AXIS - MUSE: 49 DEGREES
UNIT ABO/RH: NORMAL
UNIT NUMBER: NORMAL
UNIT STATUS: NORMAL
UNIT TYPE ISBT: 600
UROBILINOGEN UR STRIP-MCNC: >12 MG/DL
VENTRICULAR RATE- MUSE: 85 BPM
WBC # BLD AUTO: 11.3 10E3/UL (ref 4–11)
WBC URINE: 1 /HPF

## 2022-06-18 PROCEDURE — 85610 PROTHROMBIN TIME: CPT | Performed by: EMERGENCY MEDICINE

## 2022-06-18 PROCEDURE — 258N000003 HC RX IP 258 OP 636: Performed by: EMERGENCY MEDICINE

## 2022-06-18 PROCEDURE — 36415 COLL VENOUS BLD VENIPUNCTURE: CPT | Performed by: EMERGENCY MEDICINE

## 2022-06-18 PROCEDURE — 85007 BL SMEAR W/DIFF WBC COUNT: CPT | Performed by: EMERGENCY MEDICINE

## 2022-06-18 PROCEDURE — 250N000011 HC RX IP 250 OP 636: Performed by: INTERNAL MEDICINE

## 2022-06-18 PROCEDURE — 80053 COMPREHEN METABOLIC PANEL: CPT | Performed by: EMERGENCY MEDICINE

## 2022-06-18 PROCEDURE — 250N000009 HC RX 250: Performed by: INTERNAL MEDICINE

## 2022-06-18 PROCEDURE — 83605 ASSAY OF LACTIC ACID: CPT | Performed by: EMERGENCY MEDICINE

## 2022-06-18 PROCEDURE — 93005 ELECTROCARDIOGRAM TRACING: CPT | Performed by: EMERGENCY MEDICINE

## 2022-06-18 PROCEDURE — 250N000013 HC RX MED GY IP 250 OP 250 PS 637: Performed by: INTERNAL MEDICINE

## 2022-06-18 PROCEDURE — U0005 INFEC AGEN DETEC AMPLI PROBE: HCPCS | Performed by: EMERGENCY MEDICINE

## 2022-06-18 PROCEDURE — 250N000013 HC RX MED GY IP 250 OP 250 PS 637: Performed by: FAMILY MEDICINE

## 2022-06-18 PROCEDURE — C9803 HOPD COVID-19 SPEC COLLECT: HCPCS

## 2022-06-18 PROCEDURE — 96360 HYDRATION IV INFUSION INIT: CPT

## 2022-06-18 PROCEDURE — 86901 BLOOD TYPING SEROLOGIC RH(D): CPT | Performed by: EMERGENCY MEDICINE

## 2022-06-18 PROCEDURE — 99223 1ST HOSP IP/OBS HIGH 75: CPT | Performed by: FAMILY MEDICINE

## 2022-06-18 PROCEDURE — 76705 ECHO EXAM OF ABDOMEN: CPT

## 2022-06-18 PROCEDURE — P9016 RBC LEUKOCYTES REDUCED: HCPCS | Performed by: EMERGENCY MEDICINE

## 2022-06-18 PROCEDURE — 85027 COMPLETE CBC AUTOMATED: CPT | Performed by: EMERGENCY MEDICINE

## 2022-06-18 PROCEDURE — 36415 COLL VENOUS BLD VENIPUNCTURE: CPT | Performed by: INTERNAL MEDICINE

## 2022-06-18 PROCEDURE — 71045 X-RAY EXAM CHEST 1 VIEW: CPT

## 2022-06-18 PROCEDURE — 86923 COMPATIBILITY TEST ELECTRIC: CPT | Performed by: EMERGENCY MEDICINE

## 2022-06-18 PROCEDURE — 81001 URINALYSIS AUTO W/SCOPE: CPT | Performed by: EMERGENCY MEDICINE

## 2022-06-18 PROCEDURE — 83690 ASSAY OF LIPASE: CPT | Performed by: EMERGENCY MEDICINE

## 2022-06-18 PROCEDURE — 85730 THROMBOPLASTIN TIME PARTIAL: CPT | Performed by: EMERGENCY MEDICINE

## 2022-06-18 PROCEDURE — 83735 ASSAY OF MAGNESIUM: CPT | Performed by: EMERGENCY MEDICINE

## 2022-06-18 PROCEDURE — 87040 BLOOD CULTURE FOR BACTERIA: CPT | Performed by: INTERNAL MEDICINE

## 2022-06-18 PROCEDURE — 99285 EMERGENCY DEPT VISIT HI MDM: CPT | Mod: 25

## 2022-06-18 PROCEDURE — 82140 ASSAY OF AMMONIA: CPT | Performed by: EMERGENCY MEDICINE

## 2022-06-18 PROCEDURE — 120N000001 HC R&B MED SURG/OB

## 2022-06-18 PROCEDURE — 82077 ASSAY SPEC XCP UR&BREATH IA: CPT | Performed by: EMERGENCY MEDICINE

## 2022-06-18 PROCEDURE — 86850 RBC ANTIBODY SCREEN: CPT | Performed by: EMERGENCY MEDICINE

## 2022-06-18 PROCEDURE — 96361 HYDRATE IV INFUSION ADD-ON: CPT

## 2022-06-18 RX ORDER — DEXTROSE MONOHYDRATE 25 G/50ML
25-50 INJECTION, SOLUTION INTRAVENOUS
Status: DISCONTINUED | OUTPATIENT
Start: 2022-06-18 | End: 2022-06-20 | Stop reason: HOSPADM

## 2022-06-18 RX ORDER — ONDANSETRON 4 MG/1
4 TABLET, ORALLY DISINTEGRATING ORAL EVERY 6 HOURS PRN
Status: DISCONTINUED | OUTPATIENT
Start: 2022-06-18 | End: 2022-06-20 | Stop reason: HOSPADM

## 2022-06-18 RX ORDER — LIDOCAINE 40 MG/G
CREAM TOPICAL
Status: CANCELLED | OUTPATIENT
Start: 2022-06-18

## 2022-06-18 RX ORDER — ONDANSETRON 2 MG/ML
4 INJECTION INTRAMUSCULAR; INTRAVENOUS EVERY 6 HOURS PRN
Status: DISCONTINUED | OUTPATIENT
Start: 2022-06-18 | End: 2022-06-20 | Stop reason: HOSPADM

## 2022-06-18 RX ORDER — MAGNESIUM OXIDE 400 MG/1
400 TABLET ORAL 2 TIMES DAILY
Status: DISCONTINUED | OUTPATIENT
Start: 2022-06-18 | End: 2022-06-20 | Stop reason: HOSPADM

## 2022-06-18 RX ORDER — LIDOCAINE 40 MG/G
CREAM TOPICAL
Status: DISCONTINUED | OUTPATIENT
Start: 2022-06-18 | End: 2022-06-20 | Stop reason: HOSPADM

## 2022-06-18 RX ORDER — PANTOPRAZOLE SODIUM 20 MG/1
40 TABLET, DELAYED RELEASE ORAL 2 TIMES DAILY
Status: DISCONTINUED | OUTPATIENT
Start: 2022-06-18 | End: 2022-06-20 | Stop reason: HOSPADM

## 2022-06-18 RX ORDER — MULTIVITAMIN,THERAPEUTIC
1 TABLET ORAL DAILY
Status: DISCONTINUED | OUTPATIENT
Start: 2022-06-18 | End: 2022-06-20 | Stop reason: HOSPADM

## 2022-06-18 RX ORDER — FOLIC ACID 1 MG/1
1 TABLET ORAL DAILY
Status: DISCONTINUED | OUTPATIENT
Start: 2022-06-18 | End: 2022-06-20 | Stop reason: HOSPADM

## 2022-06-18 RX ORDER — PANTOPRAZOLE SODIUM 20 MG/1
40 TABLET, DELAYED RELEASE ORAL
Status: DISCONTINUED | OUTPATIENT
Start: 2022-06-18 | End: 2022-06-18 | Stop reason: DRUGHIGH

## 2022-06-18 RX ORDER — LACTULOSE 10 G/15ML
20 SOLUTION ORAL 3 TIMES DAILY
Status: DISCONTINUED | OUTPATIENT
Start: 2022-06-18 | End: 2022-06-20 | Stop reason: HOSPADM

## 2022-06-18 RX ORDER — SPIRONOLACTONE 25 MG/1
25 TABLET ORAL
Status: DISCONTINUED | OUTPATIENT
Start: 2022-06-19 | End: 2022-06-20 | Stop reason: HOSPADM

## 2022-06-18 RX ORDER — FUROSEMIDE 40 MG
40 TABLET ORAL 2 TIMES DAILY
Status: DISCONTINUED | OUTPATIENT
Start: 2022-06-19 | End: 2022-06-20 | Stop reason: HOSPADM

## 2022-06-18 RX ORDER — NICOTINE POLACRILEX 4 MG
15-30 LOZENGE BUCCAL
Status: DISCONTINUED | OUTPATIENT
Start: 2022-06-18 | End: 2022-06-20 | Stop reason: HOSPADM

## 2022-06-18 RX ORDER — SODIUM CHLORIDE 9 MG/ML
INJECTION, SOLUTION INTRAVENOUS CONTINUOUS
Status: DISCONTINUED | OUTPATIENT
Start: 2022-06-18 | End: 2022-06-18

## 2022-06-18 RX ORDER — TRAMADOL HYDROCHLORIDE 50 MG/1
50 TABLET ORAL EVERY 6 HOURS PRN
Status: DISCONTINUED | OUTPATIENT
Start: 2022-06-18 | End: 2022-06-20 | Stop reason: HOSPADM

## 2022-06-18 RX ORDER — ONDANSETRON 2 MG/ML
4 INJECTION INTRAMUSCULAR; INTRAVENOUS
Status: CANCELLED | OUTPATIENT
Start: 2022-06-18

## 2022-06-18 RX ADMIN — SODIUM CHLORIDE: 9 INJECTION, SOLUTION INTRAVENOUS at 12:33

## 2022-06-18 RX ADMIN — SODIUM CHLORIDE 1000 ML: 9 INJECTION, SOLUTION INTRAVENOUS at 11:03

## 2022-06-18 RX ADMIN — PANTOPRAZOLE SODIUM 40 MG: 20 TABLET, DELAYED RELEASE ORAL at 18:08

## 2022-06-18 RX ADMIN — LACTULOSE 20 G: 10 SOLUTION ORAL at 20:44

## 2022-06-18 RX ADMIN — Medication 400 MG: at 20:44

## 2022-06-18 RX ADMIN — FLUOXETINE 40 MG: 20 CAPSULE ORAL at 22:02

## 2022-06-18 RX ADMIN — PHYTONADIONE 10 MG: 10 INJECTION, EMULSION INTRAMUSCULAR; INTRAVENOUS; SUBCUTANEOUS at 18:03

## 2022-06-18 RX ADMIN — RIFAXIMIN 550 MG: 550 TABLET ORAL at 20:44

## 2022-06-18 ASSESSMENT — ACTIVITIES OF DAILY LIVING (ADL)
ADLS_ACUITY_SCORE: 35
ADLS_ACUITY_SCORE: 45
ADLS_ACUITY_SCORE: 47
ADLS_ACUITY_SCORE: 35
ADLS_ACUITY_SCORE: 37
ADLS_ACUITY_SCORE: 35

## 2022-06-18 NOTE — PLAN OF CARE
Problem: Bleeding (Gastrointestinal Bleeding)  Goal: Hemostasis  Outcome: Ongoing, Progressing     Problem: Anemia  Goal: Anemia Symptom Improvement  Outcome: Ongoing, Progressing  Intervention: Monitor and Manage Anemia  Recent Flowsheet Documentation  Taken 6/18/2022 1600 by Zuleika Angela, RN  Safety Promotion/Fall Prevention:    clutter free environment maintained    fall prevention program maintained    increased rounding and observation    increase visualization of patient    nonskid shoes/slippers when out of bed    patient and family education    safety round/check completed   Goal Outcome Evaluation:    Pt arrived to the floor about 1500. A&Ox3/4, fluctuates on time, drowsy. 1 unit of blood given. No adverse reaction. Tolerating oral intake. Denies nausea, denies pain. Dry blood noted on mouth, writer offered to clean but patient refused. Astx1 to commode, very weak to transfers. Bed alarm on. GI consulted, plan to do abdominal ultrasound, chest xray and blood cultures. Mother present, updated on plan. OK per supervisor that patient stays overnight due to pts learning disability (autism/aspergers). Call light within reach.

## 2022-06-18 NOTE — H&P
[facility] MEDICINE ADMISSION HISTORY AND PHYSICAL     Assessment & Plan       Dillon Salter is a 26 year old old male with history of chronic anemia secondary to variceal bleeding, alcohol abuse and dependence, drinking alcohol since 4/2022,  alcoholic liver cirrhosis, portal hypertension, DM 2, anxiety, recent hospitalization on 5/23-6/7 with encephalopathy, MSSA bacteremia, anemia, underwent EGD on May/24 required esophageal varices banding came with confusion, weakness and found to have hemoglobin 7.4 today.  No overt GI bleeding.  Started on 1 unit of packed RBC transfusion and admitted to the hospital for further evaluation and treatment    Acute metabolic and toxic encephalopathy  Hepatic encephalopathy  Compliant with lactulose 20 g 3 times a day  Having 5-6 bowel movements per day  No focal deficit    Alcoholic liver cirrhosis  Portal hypertension  Esophageal varices  Ascites  Coagulopathy from liver disease  Hyponatremia  Bilirubinemia at 15  Splenomegaly  History of heavy alcohol use for 9 years  Quit drinking alcohol since 4/2022  Status post esophageal varices banding on 5/24  Resume low-salt diet  Lasix 40 mg twice a day, spironolactone 25 mg 3 times a day   Lactulose 20 g 4 times a day  Resume PPI    Acute on chronic anemia secondary to variceal bleeding  Hemoglobin is 7.4  1 unit of packed RBC transfusion  Dried blood in both lips and had gum bleeding  Vitamin K 5 mg daily while in the hospital  No active GI bleeding at present  Evaluated by hematology and gastroenterology in last hospitalization    Generalized weakness and deconditioning  Supportive care  Physical and Occupational Therapy    DM2  Hypoglycemia  Diabetic diet and insulin sliding scale  Lantus 25 unit at bedtime, on hold     Severe protein calorie malnutrition  Increase p.o. protein intake    Depression  Fluoxetine 40 mg daily    Code full  DVT prophylaxis  Early ambulation and SCDs  Low risk as  coagulopathy and thrombocytopenia    Inpatient admission  Needs to stay in the hospital at least for 2 days for further evaluation and treatment      Chief Complaint  generalized weakness, fatigue, excessive somnolence     HISTORY     Dillon Salter is a 26 year old old male with history of chronic anemia secondary to variceal bleeding, alcohol abuse and dependence, drinking alcohol since 4/2022,  alcoholic liver cirrhosis, portal hypertension, DM 2, anxiety, recent hospitalization on 5/23-6/7 with encephalopathy, MSSA bacteremia, anemia, underwent EGD on May/24 required esophageal varices banding came with confusion, weakness and somnolence.  He has been weak since the discharge on 6/7.  He has worsening confusion, weakness and somnolence since yesterday.  Appetite has been stable.  He is compliant with lactulose and having 5-6 bowel movement per day.  He lives with his mother.  He is afebrile.  Denies headache, chest pain, breathing difficulty, palpitation, nausea, vomiting, abdominal pain or urinary symptoms.  No blood in the stool.  He was evaluated by gastroenterology and hematology last hospitalization secondary to anemia.  Had EGD and esophageal varices banding. Found to have hemoglobin 7.4 today.  No overt GI bleeding.  Started on 1 unit of packed RBC transfusion and admitted to the hospital for further evaluation.  History is provided by the patient, mother.  Spoke with ED physician.    Past Medical History     Past Medical History:  No date: Diabetes (H)     Surgical History     Past Surgical History:   Procedure Laterality Date     ESOPHAGOSCOPY, GASTROSCOPY, DUODENOSCOPY (EGD), COMBINED N/A 5/24/2022    Procedure: ESOPHAGOGASTRODUODENOSCOPY (EGD) with esophageal banding;  Surgeon: Gary Hurst MD;  Location: [facility] OR     MIDLINE DOUBLE LUMEN PLACEMENT  5/26/2022          Family History    Reviewed, and History reviewed. No pertinent family history.     Social History      Social History      Tobacco Use     Smoking status: Former Smoker     Smokeless tobacco: Former User   Vaping Use     Vaping Use: Former   Substance Use Topics     Alcohol use: Not Currently     Drug use: Not Currently     Types: Marijuana        Allergies   No Known Allergies  Prior to Admission Medications      Prior to Admission Medications   Prescriptions Last Dose Informant Patient Reported? Taking?   FLUoxetine (PROZAC) 40 MG capsule 2022  Yes Yes   Sig: Take 40 mg by mouth At Bedtime   alcohol swab prep pads   No Yes   Sig: Use to swab area of injection/marsha as directed.   blood glucose (NO BRAND SPECIFIED) test strip   No Yes   Sig: Use to test blood sugar 4 times daily or as directed. To accompany: Blood Glucose Monitor Brands: per insurance.   blood glucose monitoring (NO BRAND SPECIFIED) meter device kit   No Yes   Sig: Use to test blood sugar 4 times daily or as directed. Preferred blood glucose meter OR supplies to accompany: Blood Glucose Monitor Brands: per insurance.   folic acid (FOLVITE) 1 MG tablet 6/15/2022  No Yes   Sig: Take 1 tablet (1 mg) by mouth in the morning.   furosemide (LASIX) 40 MG tablet 2022  No Yes   Sig: Take 1 tablet (40 mg) by mouth 2 times daily   gabapentin (NEURONTIN) 100 MG capsule 6/15/2022  No Yes   Sig: Take 2 capsules (200 mg) by mouth every 4 hours as needed for neuropathic pain (foot pain)   hydrOXYzine (ATARAX) 25 MG tablet Unknown at Unknown time  Yes Yes   Sig: Take 25 mg by mouth every 8 hours as needed for anxiety    insulin aspart (NOVOLOG FLEXPEN) 100 UNIT/ML pen   No No   Si units tid with meals and 1 unit per 15 unit of carbohydrate counting   Patient not taking: No sig reported   insulin glargine (LANTUS SOLOSTAR) 100 UNIT/ML pen 2022  No Yes   Sig: Inject 25 Units Subcutaneous At Bedtime   Patient taking differently: Inject 45 Units Subcutaneous At Bedtime   insulin pen needle (ULTICARE MICRO) 32G X 4 MM miscellaneous   No Yes   Sig: Use pen needles  daily or as directed.   lactulose (CHRONULAC) 10 GM/15ML solution 6/17/2022  No Yes   Sig: Take 30 mLs (20 g) by mouth 4 times daily   Patient taking differently: Take 20 g by mouth 3 times daily   magnesium oxide (MAG-OX) 400 MG tablet 6/17/2022  No Yes   Sig: Take 1 tablet (400 mg) by mouth in the morning and 1 tablet (400 mg) in the evening.   multivitamin, therapeutic (THERA-VIT) TABS tablet 6/17/2022  No Yes   Sig: Take 1 tablet by mouth in the morning.   pantoprazole (PROTONIX) 40 MG EC tablet 6/17/2022  No Yes   Sig: Take 1 tablet (40 mg) by mouth 2 times daily   spironolactone (ALDACTONE) 25 MG tablet 6/17/2022  No Yes   Sig: Take 1 tablet (25 mg) by mouth 3 times daily   thiamine (B-1) 100 MG tablet 6/17/2022  No Yes   Sig: Take 1 tablet (100 mg) by mouth in the morning.   thin (NO BRAND SPECIFIED) lancets   No Yes   Sig: Use with lanceting device. To accompany: Blood Glucose Monitor Brands: per insurance.      Facility-Administered Medications: None      Review of Systems     A 12 point comprehensive review of systems was negative except as noted above in HPI.    PHYSICAL EXAMINATION       Vitals      Pulse:  [79-87] 80  Resp:  [6-14] 10  BP: (114-130)/(59-74) 116/63  SpO2:  [97 %-100 %] 100 %    Examination     GENERAL:  Alert, appears comfortable, in no acute distress, appears stated age, tired and sick looking, cachectic   HEAD:  Normocephalic, without obvious abnormality, atraumatic   EYES:  PERRL, scleral icterus, EOM's intact   NOSE: Nares normal, septum midline, mucosa normal, no drainage   THROAT: Lips, mucosa,   dried blood in both lips, mouth moist   NECK: Supple, symmetrical, trachea midline   BACK:   Symmetric, no curvature, ROM normal   LUNGS:   Clear to auscultation bilaterally, no rales, rhonchi, or wheezing, symmetric chest rise on inhalation, respirations unlabored   CHEST WALL:  No tenderness or deformity   HEART:  Regular rate and rhythm, S1 and S2 normal, no murmur     ABDOMEN:   Soft,  non-tender, bowel sounds active, moderate ascites,no masses, no organomegaly, no rebound or guarding   EXTREMITIES: No  edema    SKIN: Deeply jaundiced   NEURO: Alert, oriented x 4, moves all four extremities freely, non-focal   PSYCH: Cooperative, behavior is appropriate      Pertinent Lab     Most Recent 3 CBC's:Recent Labs   Lab Test 06/18/22  1035 06/16/22  1410 06/14/22  1903   WBC 11.3* 11.0 11.1*   HGB 7.4* 7.9* 6.6*   MCV 96 94 96   * 138* 121*     Most Recent 3 BMP's:Recent Labs   Lab Test 06/18/22  1134 06/18/22  1110 06/18/22  1035 06/14/22  1903 06/07/22  0920 06/07/22  0542   NA  --   --  133* 130*  --  130*   POTASSIUM  --   --  4.1 4.6  --  4.4   CHLORIDE  --   --  93* 93*  --  89*   CO2  --   --  32* 30  --  34*   BUN  --   --  30* 22  --  22   CR  --   --  0.72 0.72  --  0.65*   ANIONGAP  --   --  8 7  --  7   SARTHAK  --   --  9.6 9.1  --  8.6   GLC 87 54* 64* 280*   < > 170*    < > = values in this interval not displayed.     Most Recent 2 LFT's:Recent Labs   Lab Test 06/18/22  1035 06/14/22  1903   * 107*   ALT 47* 41   ALKPHOS 203* 188*   BILITOTAL 15.1* 15.2*     Most Recent 3 INR's:Recent Labs   Lab Test 06/18/22  1038 06/04/22  0842 06/02/22 2005   INR 1.71* 1.82* 1.79*         Elizabeth Guaman MD  [facility]   Phone: #[phone]

## 2022-06-18 NOTE — ED TRIAGE NOTES
Pt arrives to ED with c/o fatigue and weakness since yesterday. Pt has hx of low hemoglobin and needs blood transfusions often. Hx of alcoholic cirrhosis. Pt somnolent but aroused to voice. Cooperative. Pt oriented to person and place not time or situation. Pt jaundice and has blood around his lips. Denies any pain. Pt was here on Tuesday. Was recently hospitalized here as well and got transfusions every other day during his stay. Got a recheck hemoglobin on Thursday of 7.9. Pt unable to get out of car on arrival and in and out on consciousness. Mom at bedside. Denies any shortness of breath. Denies blood in urine or stool.      Triage Assessment     Row Name 06/18/22 1024       Triage Assessment (Adult)    Airway WDL WDL       Respiratory WDL    Respiratory WDL WDL       Skin Circulation/Temperature WDL    Skin Circulation/Temperature WDL WDL       Cardiac WDL    Cardiac WDL WDL       Peripheral/Neurovascular WDL    Peripheral Neurovascular WDL WDL       Cognitive/Neuro/Behavioral WDL    Cognitive/Neuro/Behavioral WDL arousability;level of consciousness;mood/behavior    Level of Consciousness alert;somnolent  very sleepy     Arousal Level opens eyes spontaneously  very sleepy     Mood/Behavior cooperative;calm

## 2022-06-18 NOTE — ED PROVIDER NOTES
"EMERGENCY DEPARTMENT ENCOUNTER      NAME: Dillon Salter  AGE: 26 year old male  YOB: 1995  MRN: [id number]  EVALUATION DATE & TIME: 6/18/2022 10:19 AM    PCP: Gary Rodrigues    ED PROVIDER: Dru Escamilla M.D.      Chief Complaint   Patient presents with     Fatigue     Jaundice         FINAL IMPRESSION:  1. Hepatic encephalopathy (H)    2. Anemia in other chronic diseases classified elsewhere          ED COURSE & MEDICAL DECISION MAKING:    Pertinent Labs & Imaging studies reviewed. (See chart for details)  26 year old male presents to the Emergency Department for evaluation of fatigue and jaundice. Patient appears non toxic with stable vitals signs, no hypoxia or increased work of breathing.  Exam is significant for confusion, jaundice.  Patient has dried blood at the lips but denies any hematemesis or bloody stools, mother is present at the bedside and states the patient has history of \"bleeding gums\" secondary to liver disease and will often have dried blood at his lips.  Patient denies any abdominal or chest pain.  Review the medical record shows history of alcoholic cirrhosis and anemia.  Concern for elevated ammonia level and hepatic encephalopathy, anemia, considered electrolyte abnormalities, low suspicion for active upper or lower GI bleed as no reports of bloody stools or hematemesis.  No reports of any falls or trauma, nothing to suggest intracranial bleed or mass.  No abdominal pain or chest pain, abdomen is benign with nothing to suggest obstruction, perforation, mesenteric ischemia, pancreatitis, cholecystitis, nothing to suggest atypical ACS, PE or dissection.  We will obtain screening labs, urine studies, ECG.  Patient was given IV fluids.    10:31 AM I met with the patient to gather history and to perform my initial exam. I discussed the plan for care while in the Emergency Department.   12:13 PM Checked in on and updated patient. Talked about admittance.   12:32 PM Spoke " with hospitalist, Dr. Guaman.   1:01 PM Consulted for blood, and discussed admission.     Reassessment: Please see critical care note below.    At the conclusion of the encounter I discussed the results of all of the tests and the disposition. The questions were answered and return precautions provided. The patient or family acknowledged understanding and was agreeable with the care plan.         MEDICATIONS GIVEN IN THE EMERGENCY:  Medications   glucose gel 15-30 g (has no administration in time range)     Or   dextrose 50 % injection 25-50 mL (has no administration in time range)     Or   glucagon injection 1 mg (has no administration in time range)   insulin aspart (NovoLOG) injection (RAPID ACTING) (has no administration in time range)   insulin aspart (NovoLOG) injection (RAPID ACTING) (has no administration in time range)   FLUoxetine (PROzac) capsule 40 mg (has no administration in time range)   folic acid (FOLVITE) tablet 1 mg (has no administration in time range)   furosemide (LASIX) tablet 40 mg (has no administration in time range)   lactulose (CHRONULAC) solution 20 g (has no administration in time range)   magnesium oxide (MAG-OX) tablet 400 mg (has no administration in time range)   multivitamin, therapeutic (THERA-VIT) tablet 1 tablet (has no administration in time range)   spironolactone (ALDACTONE) tablet 25 mg (has no administration in time range)   thiamine (B-1) tablet 100 mg (has no administration in time range)   sodium chloride (PF) 0.9% PF flush 3 mL (has no administration in time range)   sodium chloride (PF) 0.9% PF flush 3 mL (has no administration in time range)   melatonin tablet 1 mg (has no administration in time range)   ondansetron (ZOFRAN ODT) ODT tab 4 mg (has no administration in time range)     Or   ondansetron (ZOFRAN) injection 4 mg (has no administration in time range)   traMADol (ULTRAM) tablet 50 mg (has no administration in time range)   phytonadione (MEPHYTON/VITAMIN K) 1  MG/ML oral solution 5 mg (has no administration in time range)   0.9% sodium chloride BOLUS (0 mLs Intravenous Stopped 6/18/22 2494)       NEW PRESCRIPTIONS STARTED AT TODAY'S ER VISIT  New Prescriptions    No medications on file            =================================================================    HPI    Patient information was obtained from: Patient and patient's mother.     Use of Intrepreter: N/A         Dillon Salter is a 26 year old male who presents with fatigue and jaundice.     Per chart review: On 6/14/2022 at [facility] Emergency Room, patient presented for fatigue. Bilirubin is 15 here, which is trending with his baseline. His hemoglobin is down to 6.6. He got transfused a week ago on Monday. Plan to transfuse with 2 units here, patient refused transfusion. Patient is feeling better, discharged to follow-up in two days at his primary care [facility] clinic for recheck.     Since 6/17/2022 (yesterday), patient has been fatigued. Patient's mother states he threw up yesterday, but there was no blood in his vomit. She notes on 6/16/2022, at a recheck from the ED, patient's hemoglobin was 7.9. She states his current level of confusion is not his baseline.     Of note, patient is scheduled for an EGD on 6/21/2022. Patient's mother denies him missing any medications. She denies tobacco use, drug use, or alcohol consumption. Patient denies, hematuria, black or bloody stools, change in bowel or bladder habits, fever, chest pain, or any other complaints at this time.     REVIEW OF SYSTEMS   Constitutional:  Denies fever, chills. Positive for fatigue.   Respiratory:  Denies productive cough or increased work of breathing  Cardiovascular:  Denies chest pain, palpitations  GI:  Denies abdominal pain, nausea, hematuria, black or bloody stools, or change in bowel or bladder habits. Positive for vomiting (one episode - no blood)  Musculoskeletal:  Denies any new muscle/joint swelling  Skin:  Denies rash    Neurologic:  Denies focal weakness  All systems negative except as marked.     PAST MEDICAL HISTORY:  Past Medical History:   Diagnosis Date     Diabetes (H)        PAST SURGICAL HISTORY:  Past Surgical History:   Procedure Laterality Date     ESOPHAGOSCOPY, GASTROSCOPY, DUODENOSCOPY (EGD), COMBINED N/A 5/24/2022    Procedure: ESOPHAGOGASTRODUODENOSCOPY (EGD) with esophageal banding;  Surgeon: Gary Hurst MD;  Location: [facility] Main OR     MIDLINE DOUBLE LUMEN PLACEMENT  5/26/2022              CURRENT MEDICATIONS:    Prior to Admission medications    Medication Sig Start Date End Date Taking? Authorizing Provider   alcohol swab prep pads Use to swab area of injection/marsha as directed.  Patient not taking: Reported on 6/16/2022 4/12/22   Elizabeth Guaman MD   blood glucose (NO BRAND SPECIFIED) test strip Use to test blood sugar 4 times daily or as directed. To accompany: Blood Glucose Monitor Brands: per insurance.  Patient not taking: Reported on 6/16/2022 4/12/22   Elizabeth Guaman MD   blood glucose monitoring (NO BRAND SPECIFIED) meter device kit Use to test blood sugar 4 times daily or as directed. Preferred blood glucose meter OR supplies to accompany: Blood Glucose Monitor Brands: per insurance.  Patient not taking: Reported on 6/16/2022 4/12/22   Elizabeth Guaman MD   FLUoxetine (PROZAC) 40 MG capsule Take 40 mg by mouth At Bedtime    Unknown, Entered By History   folic acid (FOLVITE) 1 MG tablet Take 1 tablet (1 mg) by mouth in the morning. 4/12/22   Elizabeth Guaman MD   furosemide (LASIX) 40 MG tablet Take 1 tablet (40 mg) by mouth 2 times daily 6/7/22   Lisa Cardoso MD   gabapentin (NEURONTIN) 100 MG capsule Take 2 capsules (200 mg) by mouth every 4 hours as needed for neuropathic pain (foot pain) 6/7/22   Lisa Cardoso MD   hydrOXYzine (ATARAX) 25 MG tablet Take 25 mg by mouth every 8 hours as needed for anxiety     Unknown, Entered By History   insulin aspart (NOVOLOG FLEXPEN)  100 UNIT/ML pen 5 units tid with meals and 1 unit per 15 unit of carbohydrate counting  Patient not taking: No sig reported 4/12/22   Elizabeth Guaman MD   insulin glargine (LANTUS SOLOSTAR) 100 UNIT/ML pen Inject 25 Units Subcutaneous At Bedtime 6/7/22   Lisa Cardoso MD   insulin pen needle (ULTICARE MICRO) 32G X 4 MM miscellaneous Use pen needles daily or as directed. 4/12/22   Elizabeth Guaman MD   lactulose (CHRONULAC) 10 GM/15ML solution Take 30 mLs (20 g) by mouth 4 times daily 6/7/22   Lisa Cardoso MD   magnesium oxide (MAG-OX) 400 MG tablet Take 1 tablet (400 mg) by mouth in the morning and 1 tablet (400 mg) in the evening. 4/11/22   Elizabeth Guaman MD   multivitamin, therapeutic (THERA-VIT) TABS tablet Take 1 tablet by mouth in the morning. 4/12/22   Elizabeth Guaman MD   pantoprazole (PROTONIX) 40 MG EC tablet Take 1 tablet (40 mg) by mouth 2 times daily 6/7/22   Lisa Cardoso MD   spironolactone (ALDACTONE) 25 MG tablet Take 1 tablet (25 mg) by mouth 3 times daily 6/7/22   Lisa Cardoso MD   thiamine (B-1) 100 MG tablet Take 1 tablet (100 mg) by mouth in the morning. 4/12/22   Elizabeth Guaman MD   thin (NO BRAND SPECIFIED) lancets Use with lanceting device. To accompany: Blood Glucose Monitor Brands: per insurance. 4/12/22   Elizabeth Guaman MD        ALLERGIES:  No Known Allergies    FAMILY HISTORY:  History reviewed. No pertinent family history.    SOCIAL HISTORY:   Social History     Socioeconomic History     Marital status: Single   Tobacco Use     Smoking status: Former Smoker     Smokeless tobacco: Former User   Vaping Use     Vaping Use: Former   Substance and Sexual Activity     Alcohol use: Not Currently     Drug use: Not Currently     Types: Marijuana   Social History Narrative    26-year-old history of autism/Asperger's on the spectrum graduated high school at [employer] worked at [employer] and then another  place until the Covid epidemic in 2020 lives with parents  "(Leticia and Wes) socially isolated drinks alcohol beer daily        End-stage cirrhosis noted on admission to  April 2022       VITALS:  Patient Vitals for the past 24 hrs:   BP Pulse Resp SpO2 Height Weight   06/18/22 1430 105/55 83 16 100 % -- --   06/18/22 1415 104/59 83 10 100 % -- --   06/18/22 1400 111/56 83 9 100 % -- --   06/18/22 1345 119/60 84 10 100 % -- --   06/18/22 1330 120/59 82 11 100 % -- --   06/18/22 1315 107/58 81 9 100 % -- --   06/18/22 1300 111/56 80 10 100 % -- --   06/18/22 1230 118/62 80 10 100 % -- --   06/18/22 1215 116/63 80 10 100 % -- --   06/18/22 1200 114/59 79 12 100 % -- --   06/18/22 1145 119/61 79 (!) 6 100 % -- --   06/18/22 1130 128/73 84 12 100 % -- --   06/18/22 1115 130/71 87 14 97 % -- --   06/18/22 1100 122/69 83 10 100 % -- --   06/18/22 1045 124/67 83 11 100 % -- --   06/18/22 1030 129/74 85 8 100 % -- --   06/18/22 1026 129/74 84 12 100 % 1.651 m (5' 5\") 56.7 kg (125 lb)        PHYSICAL EXAM    Constitutional:  Awake, interactive, in no apparent distress  HENT:  Normocephalic, Atraumatic. Bilateral external ears normal. Oropharynx moist, dried blood noted at the lips, dried blood along gums. Nose normal. Neck- Normal range of motion with no guarding, No midline cervical tenderness, Supple, No stridor.   Eyes:  PERRL, EOMI with no signs of entrapment, bilateral scleral icterus present, No discharge.   Respiratory:  Normal breath sounds, No respiratory distress, No wheezing.    Cardiovascular:  Normal heart rate, Normal rhythm, No appreciable rubs or gallops.   GI:  Soft, No tenderness, No distension, No palpable masses  Musculoskeletal:  Intact distal pulses, No edema. Good range of motion in all major joints. No tenderness to palpation or major deformities noted.  Integument:  Warm, Dry, jaundice  Neurologic: Not alert to place or time, follows commands and moves extremities spontaneously  Psychiatric: Flat affect    LAB:  All pertinent labs reviewed and " interpreted.  Results for orders placed or performed during the hospital encounter of 06/18/22   Extra Red Top Tube   Result Value Ref Range    Hold Specimen JI    Extra Green Top (Lithium Heparin) Tube   Result Value Ref Range    Hold Specimen     Extra Purple Top Tube   Result Value Ref Range    Hold Specimen JI    Extra Blood Bank Purple Top Tube   Result Value Ref Range    Hold Specimen JIC    UA with Microscopic reflex to Culture    Specimen: Urine, Catheter   Result Value Ref Range    Color Urine Yellow Colorless, Straw, Light Yellow, Yellow    Appearance Urine Clear Clear    Glucose Urine Negative Negative mg/dL    Bilirubin Urine Negative Negative    Ketones Urine Negative Negative mg/dL    Specific Gravity Urine 1.015 1.001 - 1.030    Blood Urine Negative Negative    pH Urine 7.0 5.0 - 7.0    Protein Albumin Urine Negative Negative mg/dL    Urobilinogen Urine >12.0 (A) <2.0 mg/dL    Nitrite Urine Negative Negative    Leukocyte Esterase Urine Negative Negative    RBC Urine <1 <=2 /HPF    WBC Urine 1 <=5 /HPF    Squamous Epithelials Urine <1 <=1 /HPF   Extra Blue Top Tube   Result Value Ref Range    Hold Specimen JIC    Result Value Ref Range    INR 1.71 (H) 0.85 - 1.15   Partial thromboplastin time   Result Value Ref Range    aPTT 46 (H) 22 - 38 Seconds   Comprehensive metabolic panel   Result Value Ref Range    Sodium 133 (L) 136 - 145 mmol/L    Potassium 4.1 3.5 - 5.0 mmol/L    Chloride 93 (L) 98 - 107 mmol/L    Carbon Dioxide (CO2) 32 (H) 22 - 31 mmol/L    Anion Gap 8 5 - 18 mmol/L    Urea Nitrogen 30 (H) 8 - 22 mg/dL    Creatinine 0.72 0.70 - 1.30 mg/dL    Calcium 9.6 8.5 - 10.5 mg/dL    Glucose 64 (L) 70 - 125 mg/dL    Alkaline Phosphatase 203 (H) 45 - 120 U/L     (H) 0 - 40 U/L    ALT 47 (H) 0 - 45 U/L    Protein Total 8.2 (H) 6.0 - 8.0 g/dL    Albumin 3.1 (L) 3.5 - 5.0 g/dL    Bilirubin Total 15.1 (HH) 0.0 - 1.0 mg/dL    GFR Estimate >90 >60 mL/min/1.73m2   Result Value Ref Range    Lipase 81  (H) 0 - 52 U/L   Lactic acid whole blood   Result Value Ref Range    Lactic Acid 1.1 0.7 - 2.0 mmol/L   Result Value Ref Range    Ammonia 58 (H) 11 - 35 umol/L   Result Value Ref Range    Magnesium 1.8 1.8 - 2.6 mg/dL   Asymptomatic COVID-19 Virus (Coronavirus) by PCR Nasopharyngeal    Specimen: Nasopharyngeal; Swab   Result Value Ref Range    SARS CoV2 PCR Negative Negative   Ethyl Alcohol Level   Result Value Ref Range    Alcohol, Blood <10 None detected mg/dL   CBC with platelets and differential   Result Value Ref Range    WBC Count 11.3 (H) 4.0 - 11.0 10e3/uL    RBC Count 2.21 (L) 4.40 - 5.90 10e6/uL    Hemoglobin 7.4 (L) 13.3 - 17.7 g/dL    Hematocrit 21.1 (L) 40.0 - 53.0 %    MCV 96 78 - 100 fL    MCH 33.5 (H) 26.5 - 33.0 pg    MCHC 35.1 31.5 - 36.5 g/dL    RDW 20.9 (H) 10.0 - 15.0 %    Platelet Count 114 (L) 150 - 450 10e3/uL   Glucose by meter   Result Value Ref Range    GLUCOSE BY METER POCT 54 (L) 70 - 99 mg/dL   Glucose by meter   Result Value Ref Range    GLUCOSE BY METER POCT 87 70 - 99 mg/dL   Manual Differential   Result Value Ref Range    % Neutrophils 65 %    % Lymphocytes 26 %    % Monocytes 3 %    % Eosinophils 3 %    % Basophils 2 %    % Metamyelocytes 1 %    NRBCs per 100 WBC 1 (H) <=0 %    Absolute Neutrophils 7.3 1.6 - 8.3 10e3/uL    Absolute Lymphocytes 2.9 0.8 - 5.3 10e3/uL    Absolute Monocytes 0.3 0.0 - 1.3 10e3/uL    Absolute Eosinophils 0.3 0.0 - 0.7 10e3/uL    Absolute Basophils 0.2 0.0 - 0.2 10e3/uL    Absolute Metamyelocytes 0.1 (H) <=0.0 10e3/uL    Absolute NRBCs 0.1 (H) <=0.0 10e3/uL    RBC Morphology Confirmed RBC Indices     Platelet Assessment  Automated Count Confirmed. Platelet morphology is normal.     Automated Count Confirmed. Platelet morphology is normal.    Acanthocytes Slight (A) None Seen    [place] Cells Slight (A) None Seen   ECG 12-LEAD WITH MUSE (LHE)   Result Value Ref Range    Systolic Blood Pressure 129 mmHg    Diastolic Blood Pressure 74 mmHg    Ventricular Rate  85 BPM    Atrial Rate 85 BPM    SD Interval 188 ms    QRS Duration 98 ms     ms    QTc 514 ms    P Axis 76 degrees    R AXIS 61 degrees    T Axis 49 degrees    Interpretation ECG       Sinus rhythm  Possible Left atrial enlargement  Prolonged QT  Abnormal ECG  When compared with ECG of 18-JUN-2022 10:27,  Premature ventricular complexes are no longer Present  Confirmed by SEE ED PROVIDER NOTE FOR, ECG INTERPRETATION ([id number]),  KEMAR HORTA ([id number]) on 6/18/2022 10:39:26 AM     Adult Type and Screen   Result Value Ref Range    ABO/RH(D) A NEG     Antibody Screen Negative Negative    SPECIMEN EXPIRATION DATE [id number]    Prepare red blood cells (unit)   Result Value Ref Range    CROSSMATCH Compatible     UNIT ABO/RH A Neg     Unit Number [id number]     Unit Status Ready     Blood Component Type Red Blood Cells     Product Code [id number]     CODING SYSTEM [id number]     UNIT TYPE ISBT 0600        RADIOLOGY:  No orders to display          EKG:    Sinus rhythm, no specific ST acute ischemic changes, did note prolonged QTC but no other concerning dysrhythmias or interval prolongation, compared to ECG of May 25, 2022, no specific ST acute ischemic changes appreciated  I have independently reviewed and interpreted the EKG(s) documented above.    PROCEDURES    CRITICAL CARE NOTE:  Indication: Anemia  Interventions: Patient placed on continuous pulse oximetry and cardiac telemetry.  Large-bore IV access was obtained.  Labs significant for hemoglobin of 7.4, given the patient's progressive symptoms and chronic illness, concern for endorgan dysfunction or acute deterioration therefore plan is for PRBC transfusion, did obtain written consent, verbal consent from the patient.  Otherwise labs reflect chronic disease with chronically elevated bilirubin, alk phos, ALT and AST.  Did note elevated ammonia consistent with hepatic encephalopathy, the patient is confused here.  Again no falls or trauma, no outward signs  of trauma with nothing to suggest intracranial bleed or mass.  Urine showed no blood or signs of infection.  Patient was given IV fluids, we did initiate PRBC transfusion and the patient will be admitted for treatment of anemia and hepatic encephalopathy.  Discussed these findings recommendations with patient and family.  Discussed care plan with admitting service.  Treatments: IV fluid resuscitation, PRBC transfusion  Critical Care time excluding procedures: 60 minutes        I, Verito Madrigal, am serving as a scribe to document services personally performed by Dru Escamilla MD, based on my observation and the provider's statements to me. I, Dru Escamilla MD attest that Verito Madrigal is acting in a scribe capacity, has observed my performance of the services and has documented them in accordance with my direction.    Dru Escamilla M.D.  Emergency Medicine  [facility] EMERGENCY ROOM  [address] [address]  [phone]  Dept: [phone]     Dru Escamilla MD  06/18/22 0493

## 2022-06-18 NOTE — ED NOTES
Low glucose of 54; MD and primary RN informed, gave 2 small apple juice cups and small soda. Seems to be keeping fluids down okay at this time

## 2022-06-18 NOTE — PHARMACY-ADMISSION MEDICATION HISTORY
Pharmacy Note - Admission Medication History    Pertinent Provider Information: Patient's mom manages all meds and went through them with me - stated they ran out of folic acid a few days ago, don't use insulin aspart at home, and use lantus (45 units) daily at bedtime     ______________________________________________________________________    Prior To Admission (PTA) med list completed and updated in EMR.       PTA Med List   Medication Sig Last Dose     alcohol swab prep pads Use to swab area of injection/marsha as directed.      blood glucose (NO BRAND SPECIFIED) test strip Use to test blood sugar 4 times daily or as directed. To accompany: Blood Glucose Monitor Brands: per insurance.      blood glucose monitoring (NO BRAND SPECIFIED) meter device kit Use to test blood sugar 4 times daily or as directed. Preferred blood glucose meter OR supplies to accompany: Blood Glucose Monitor Brands: per insurance.      FLUoxetine (PROZAC) 40 MG capsule Take 40 mg by mouth At Bedtime 6/17/2022     folic acid (FOLVITE) 1 MG tablet Take 1 tablet (1 mg) by mouth in the morning. 6/15/2022     furosemide (LASIX) 40 MG tablet Take 1 tablet (40 mg) by mouth 2 times daily 6/17/2022     gabapentin (NEURONTIN) 100 MG capsule Take 2 capsules (200 mg) by mouth every 4 hours as needed for neuropathic pain (foot pain) 6/15/2022     hydrOXYzine (ATARAX) 25 MG tablet Take 25 mg by mouth every 8 hours as needed for anxiety  Unknown at Unknown time     insulin glargine (LANTUS SOLOSTAR) 100 UNIT/ML pen Inject 25 Units Subcutaneous At Bedtime (Patient taking differently: Inject 45 Units Subcutaneous At Bedtime) 6/17/2022     insulin pen needle (ULTICARE MICRO) 32G X 4 MM miscellaneous Use pen needles daily or as directed.      lactulose (CHRONULAC) 10 GM/15ML solution Take 30 mLs (20 g) by mouth 4 times daily (Patient taking differently: Take 20 g by mouth 3 times daily) 6/17/2022     magnesium oxide (MAG-OX) 400 MG tablet Take 1 tablet (400  mg) by mouth in the morning and 1 tablet (400 mg) in the evening. 6/17/2022     multivitamin, therapeutic (THERA-VIT) TABS tablet Take 1 tablet by mouth in the morning. 6/17/2022     pantoprazole (PROTONIX) 40 MG EC tablet Take 1 tablet (40 mg) by mouth 2 times daily 6/17/2022     spironolactone (ALDACTONE) 25 MG tablet Take 1 tablet (25 mg) by mouth 3 times daily 6/17/2022     thiamine (B-1) 100 MG tablet Take 1 tablet (100 mg) by mouth in the morning. 6/17/2022     thin (NO BRAND SPECIFIED) lancets Use with lanceting device. To accompany: Blood Glucose Monitor Brands: per insurance.        Information source(s): Family member and CareEverywhere/SureScripts  Method of interview communication: in-person    Summary of Changes to PTA Med List  New: none  Discontinued: insulin aspart  Changed: lantus, lactulose    Patient was asked about OTC/herbal products specifically.  PTA med list reflects this.    In the past week, patient estimated taking medication this percent of the time:  greater than 90%.    Allergies were reviewed, assessed, and updated with the patient.      Patient does not use any multi-dose medications prior to admission.    The information provided in this note is only as accurate as the sources available at the time of the update(s).    Thank you for the opportunity to participate in the care of this patient.    Ludivina Whitley  6/18/2022 11:17 AM

## 2022-06-18 NOTE — CONSULTS
GI CONSULT NOTE      Name: Dillon Salter  Medical Record #: [id number]  YOB: 1995  Date of Admission: 6/18/2022  Date/Time: 6/18/2022/3:20 PM     CHIEF COMPLAINT: confusion     HISTORY OF PRESENT ILLNESS: This is a 26 year old year old White patient seen at the request of Rowena with a history of Asperger's, anxiety/depression, and alcoholic cirrhosis diagnosed 4/2022 sober since 4/6/22 when he presented with alcoholic hepatitis.  He is now admitted with mental status changes and weakness.    He did have a liver clinic follow up at the [facility] in early May, but presented to [facility] later in May with a variceal bleed and underwent EGD with banding 5/24/2022.  Repeat EGD was arranged at the [facility] 7/7.  During the May hospitalization Hematology was consulted due hemolysis.  He was on antibiotics for MSSA bacteremia and UTI. He was on lactulose for encephalopathy and was to take this four times daily.  Notes suggest he was compliant taking lactulose but maybe only 3 times daily.    He was brought to the ER today because of fatigue.  According to ER notes he vomited yesterday but there was no blood.  He has had bleeding from his gums.  No black or bloody stools reported.  No alcohol since April.  EGD is currently scheduled for 6/21/22.  He was seen in the ED 6/14 for fatigue and hemoglobin was 6.6, and 2 units of blood were recommended but patient refused.  He did follow up with Hematology.    PAST MEDICAL HISTORY:  Past Medical History:   Diagnosis Date     Diabetes (H)        FAMILY HISTORY:  History reviewed. No pertinent family history.    SOCIAL HISTORY:  Social History     Socioeconomic History     Marital status: Single     Spouse name: Not on file     Number of children: Not on file     Years of education: Not on file     Highest education level: Not on file   Occupational History     Not on file   Tobacco Use     Smoking status: Former Smoker     Smokeless tobacco: Former User    Vaping Use     Vaping Use: Former   Substance and Sexual Activity     Alcohol use: Not Currently     Drug use: Not Currently     Types: Marijuana     Sexual activity: Not on file   Other Topics Concern     Not on file   Social History Narrative    26-year-old history of autism/Asperger's on the spectrum graduated high school at [employer] ([facility] [employer]) worked at  and then another  place until the Covid epidemic in 2020 lives with parents (Leticia and Wes) socially isolated drinks alcohol beer daily        End-stage cirrhosis noted on admission to  April 2022     Social Determinants of Health     Financial Resource Strain: Not on file   Food Insecurity: Not on file   Transportation Needs: Not on file   Physical Activity: Not on file   Stress: Not on file   Social Connections: Not on file   Intimate Partner Violence: Not on file   Housing Stability: Not on file       MEDICATIONS PRIOR TO ADMISSION:   Medications Prior to Admission   Medication Sig Dispense Refill Last Dose     alcohol swab prep pads Use to swab area of injection/marsha as directed. 100 each 3      blood glucose (NO BRAND SPECIFIED) test strip Use to test blood sugar 4 times daily or as directed. To accompany: Blood Glucose Monitor Brands: per insurance. 100 strip 6      blood glucose monitoring (NO BRAND SPECIFIED) meter device kit Use to test blood sugar 4 times daily or as directed. Preferred blood glucose meter OR supplies to accompany: Blood Glucose Monitor Brands: per insurance. 1 kit 0      FLUoxetine (PROZAC) 40 MG capsule Take 40 mg by mouth At Bedtime   6/17/2022     folic acid (FOLVITE) 1 MG tablet Take 1 tablet (1 mg) by mouth in the morning. 30 tablet 0 6/15/2022     furosemide (LASIX) 40 MG tablet Take 1 tablet (40 mg) by mouth 2 times daily 30 tablet 3 6/17/2022     gabapentin (NEURONTIN) 100 MG capsule Take 2 capsules (200 mg) by mouth every 4 hours as needed for neuropathic pain (foot pain) 20 capsule 1 6/15/2022     hydrOXYzine (ATARAX)  "25 MG tablet Take 25 mg by mouth every 8 hours as needed for anxiety    Unknown at Unknown time     insulin glargine (LANTUS SOLOSTAR) 100 UNIT/ML pen Inject 25 Units Subcutaneous At Bedtime (Patient taking differently: Inject 45 Units Subcutaneous At Bedtime) 15 mL 3 6/17/2022     insulin pen needle (ULTICARE MICRO) 32G X 4 MM miscellaneous Use pen needles daily or as directed. 100 each 3      lactulose (CHRONULAC) 10 GM/15ML solution Take 30 mLs (20 g) by mouth 4 times daily (Patient taking differently: Take 20 g by mouth 3 times daily) 1892 mL 1 6/17/2022     magnesium oxide (MAG-OX) 400 MG tablet Take 1 tablet (400 mg) by mouth in the morning and 1 tablet (400 mg) in the evening. 60 tablet 0 6/17/2022     multivitamin, therapeutic (THERA-VIT) TABS tablet Take 1 tablet by mouth in the morning.   6/17/2022     pantoprazole (PROTONIX) 40 MG EC tablet Take 1 tablet (40 mg) by mouth 2 times daily 30 tablet 0 6/17/2022     spironolactone (ALDACTONE) 25 MG tablet Take 1 tablet (25 mg) by mouth 3 times daily 180 tablet 1 6/17/2022     thiamine (B-1) 100 MG tablet Take 1 tablet (100 mg) by mouth in the morning. 30 tablet 0 6/17/2022     thin (NO BRAND SPECIFIED) lancets Use with lanceting device. To accompany: Blood Glucose Monitor Brands: per insurance. 100 each 6      insulin aspart (NOVOLOG FLEXPEN) 100 UNIT/ML pen 5 units tid with meals and 1 unit per 15 unit of carbohydrate counting (Patient not taking: No sig reported) 15 mL 3           ALLERGIES: Patient has no known allergies.    REVIEW OF SYSTEMS (ROS): Complete review of systems negative other than listed in HPI.    PHYSICAL EXAM:    /82 (BP Location: Right arm)   Pulse 87   Temp 97.3  F (36.3  C) (Oral)   Resp 14   Ht 1.651 m (5' 5\")   Wt 56.7 kg (125 lb)   SpO2 99%   BMI 20.80 kg/m      GENERAL: No obvious distress    SKIN:  Jaundice    NECK: Supple without adenopathy    EYES: No scleral icterus    LUNGS: Clear to auscultation " bilaterally    HEART: Regular rate and rhythm, S1 and S2 present, no lower extremity edema    ABDOMEN: Soft, non-distended, non-tender, no guarding/rebound/mass, bowel sounds normal, no obvious organomegaly    MUSKULOSKELETAL:  Warm and well perfused, moves all extremities well    NEUROLOGIC: Alert and oriented    PSYCHIATRIC: Normal affect    LAB DATA:  Recent Labs   Lab Test 06/18/22  1038 06/18/22  1035 06/16/22  1410 06/14/22  1903 06/05/22  0006 06/04/22  0842 06/03/22  0500 06/02/22 2005   WBC  --  11.3* 11.0 11.1*   < >  --    < >  --    HGB  --  7.4* 7.9* 6.6*   < >  --    < >  --    MCV  --  96 94 96   < >  --    < >  --    PLT  --  114* 138* 121*   < >  --    < >  --    INR 1.71*  --   --   --   --  1.82*  --  1.79*    < > = values in this interval not displayed.     Recent Labs   Lab Test 06/18/22  1134 06/18/22  1110 06/18/22  1035 06/14/22 1903 06/07/22  0920 06/07/22  0542   NA  --   --  133* 130*  --  130*   POTASSIUM  --   --  4.1 4.6  --  4.4   CHLORIDE  --   --  93* 93*  --  89*   CO2  --   --  32* 30  --  34*   BUN  --   --  30* 22  --  22   CR  --   --  0.72 0.72  --  0.65*   ANIONGAP  --   --  8 7  --  7   SARTHAK  --   --  9.6 9.1  --  8.6   GLC 87 54* 64* 280*   < > 170*    < > = values in this interval not displayed.     Recent Labs   Lab Test 06/18/22  1036 06/18/22  1035 06/14/22  1903 06/07/22  0542 05/24/22  0619 05/23/22  2300 04/07/22  0624 04/06/22  2307   ALBUMIN  --  3.1* 3.0* 2.4*   < >  --    < > 3.7   BILITOTAL  --  15.1* 15.2* 12.4*   < >  --    < > 11.7*   ALT  --  47* 41 16   < >  --    < > 131*   AST  --  119* 107* 102*   < >  --    < > 237*   ALKPHOS  --  203* 188* 157*   < >  --    < > 158*   PROTEIN Negative  --   --   --   --  Negative  --   --    LIPASE  --  81* 77*  --   --   --   --  45    < > = values in this interval not displayed.     Haptoglobin low 6/7    EGD 5/24/22 ([facility]):  Findings:        Grade II varices were found in the lower third of the esophagus from         30-39 cm. They were 9 mm in largest diameter. There was old blood in the        esophagus. Three bands were successfully placed with complete        eradication, resulting in deflation of varices. The bands were placed at        38 cm, 35 cm and 33 cm along one prominent varix. There was no bleeding        during and at the end of the procedure.        The Z-line was regular and was found 39 cm from the incisors.        Hematin (altered blood/coffee-ground-like material) and old red blood        were found in the gastric body and fundus. No active bleeding was        observed.       The duodenal bulb, first portion of the duodenum and second portion of        the duodenum were normal. There was no blood in the duodenum.     Impression:  - Grade II esophageal varices. Completely eradicated.                          Banded.                          - Z-line regular, 39 cm from the incisors.                          - Hematin (altered blood/coffee-ground-like material)                          in the gastric body.                          - Normal duodenal bulb, first portion of the duodenum                          and second portion of the duodenum.                          - No specimens collected.   Recommendation:        - [facility].                          - Clear liquid diet today.                          - Continue present medications-Ceftriaxone, Octreotide                          and IV Protonix                          - Consider Lactulose 30 cc PO qday to aim for 3-4                          BM's/day                          - Repeat EGD with General in 3-4 weeks-ordered thru                          [facility] (later rescheduled with [facility] 7/7)    IMAGING:  US abd hepatic/portal vasculature 5/24/22:  FINDINGS:   PANCREAS: Normal where visualized, without focal mass identified. No pancreatic ductal dilatation is identified.     LIVER: The liver is coarsened in  echogenicity with a peripheral nodular contour. Gallbladder sludge is noted biliary ductal dilatation.     OTHER: Small volume free fluid adjacent to the gallbladder.     Velocity (centimeters per second):  Main portal vein: 38  Left portal vein: 20  Right portal vein: 53  Left hepatic vein: 30  Middle hepatic vein: 19  Right hepatic vein: 29  Hepatic artery: 244  Splenic vein at pancreas: 21     Portal vein diameter: 1.2 cm.                                                                  IMPRESSION:   1. Cirrhotic liver morphology.  2. Patent hepatic vessels with appropriately directed flow.  3. Gallbladder sludge without sonographic evidence of cholecystitis.    ASSESSMENT:  1. Alcoholic liver cirrhosis, sober since early April 2022, admitted with mental status changes due to hepatic encephalopathy.  Infection should be ruled out.  His MELD-NA score is currently 26 which is fairly stable compared to 27 on 6/4 during recently hospital stay.  2.  Anemia- likely multifactorial.  He does have a history of acute GI bleeding related to varices S/P banding on 5/24 with repeat EGD planned early next week here.  His gums have been oozing blood reportedly, and old blood is seen around his mouth.  Hematology has evaluated with evidence for hemolysis, and likely bone marrow suppression related to cirrhosis.  3.  Hepatic encephalopathy- on lactulose at home.  Will restart at QID dosing, and also add Rifaximin.  Studies will be ordered to rule out infection.    Active Problems:    Hepatic encephalopathy (H)    Anemia in other chronic diseases classified elsewhere    PLAN:  Discussed with Dr. Fady Eugene.  Approximately 50 minutes of total time was spent providing patient care, including patient evaluation, reviewing documentation/test results, and .    1.  Lactulose TID.  2.  Rifaximin 550 mg BID.  3.  PPI.  4.  Low sodium diet when able.  5.  CXR, UA, blood cultures.  6.  Repeat ultrasound to look for ascites, and  paracentesis with fluid studies checking for SBP if ascites is present.  7.  Monitor hemoglobin and stool output.  8.  Will keep EGD as planned 6/21 unless he has overt GI bleeding, and then EGD could be done sooner.  9.  Recommend ongoing alcohol sobriety.  10.  Trend MELD labs.      Deysi Jauregui PA-C  [facility]  [phone]    CC: [facility], [facility]    GI ATTENDING BRIEF ADDENDUM:  The patient was seen and examined.  Discussed with Deysi Jauregui PA-C.  Agree with findings and plan as above with the following addendum.     25 yo M with ETOH cirrhosis c/b portal HTN, EV with bleeding requiring banding 5/24/2022, hepatic encephalopathy who is admitted to the hospital with worsening encephalopathy. MELD-Na 26.    On exam he is jaundiced, chronically ill appearing, drowsy and falling asleep mid sentence, oriented x 2, + asterixis.    No overt GI bleeding at this time although has noted bleeding gums. Hgb stable since last discharge. Reportedly has been compliant with medications. Need to r/o infection as trigger for precipitating encephalopathy.    Recommend full infectious workup including UA, Blood cultures, chest Xray, Abdominal ultrasound to see if enough ascites to perform paracentesis. Continue lactulose 20g TID and titrate to 3-4 soft/loose BMs/day. Add rifixamin 500 mg BID.    Monitor hemoglobin and transfuse as necessary to keep Hgb >7. Watch for signs of overt GI bleeding. He has endoscopy scheduled for 6/21/2022 for follow up of varices. Once infection is ruled out consider starting NSBB for secondary prophylaxis of EV bleeding.    Give PO Vit K 10 mg daily x 3 days. Monitor daily LFTs, INR.    Strict abstinence from alcohol.    JAMISON Kumar  [facility]    Total visit time = 36 minutes; more than 50% spent counseling/coordinating care.

## 2022-06-19 ENCOUNTER — APPOINTMENT (OUTPATIENT)
Dept: PHYSICAL THERAPY | Facility: CLINIC | Age: 27
DRG: 441 | End: 2022-06-19
Attending: FAMILY MEDICINE
Payer: COMMERCIAL

## 2022-06-19 ENCOUNTER — APPOINTMENT (OUTPATIENT)
Dept: OCCUPATIONAL THERAPY | Facility: CLINIC | Age: 27
DRG: 441 | End: 2022-06-19
Attending: FAMILY MEDICINE
Payer: COMMERCIAL

## 2022-06-19 LAB
ALBUMIN SERPL-MCNC: 2.6 G/DL (ref 3.5–5)
ALP SERPL-CCNC: 172 U/L (ref 45–120)
ALT SERPL W P-5'-P-CCNC: 40 U/L (ref 0–45)
ANION GAP SERPL CALCULATED.3IONS-SCNC: 7 MMOL/L (ref 5–18)
AST SERPL W P-5'-P-CCNC: 101 U/L (ref 0–40)
BILIRUB SERPL-MCNC: 13.5 MG/DL (ref 0–1)
BUN SERPL-MCNC: 22 MG/DL (ref 8–22)
CALCIUM SERPL-MCNC: 9.1 MG/DL (ref 8.5–10.5)
CHLORIDE BLD-SCNC: 98 MMOL/L (ref 98–107)
CO2 SERPL-SCNC: 27 MMOL/L (ref 22–31)
CREAT SERPL-MCNC: 0.61 MG/DL (ref 0.7–1.3)
ERYTHROCYTE [DISTWIDTH] IN BLOOD BY AUTOMATED COUNT: 22 % (ref 10–15)
GFR SERPL CREATININE-BSD FRML MDRD: >90 ML/MIN/1.73M2
GLUCOSE BLD-MCNC: 189 MG/DL (ref 70–125)
GLUCOSE BLDC GLUCOMTR-MCNC: 208 MG/DL (ref 70–99)
GLUCOSE BLDC GLUCOMTR-MCNC: 209 MG/DL (ref 70–99)
GLUCOSE BLDC GLUCOMTR-MCNC: 251 MG/DL (ref 70–99)
GLUCOSE BLDC GLUCOMTR-MCNC: 263 MG/DL (ref 70–99)
HCT VFR BLD AUTO: 20.8 % (ref 40–53)
HGB BLD-MCNC: 7.2 G/DL (ref 13.3–17.7)
INR PPP: 1.66 (ref 0.85–1.15)
MAGNESIUM SERPL-MCNC: 1.8 MG/DL (ref 1.8–2.6)
MCH RBC QN AUTO: 32.6 PG (ref 26.5–33)
MCHC RBC AUTO-ENTMCNC: 34.6 G/DL (ref 31.5–36.5)
MCV RBC AUTO: 94 FL (ref 78–100)
PHOSPHATE SERPL-MCNC: 3.6 MG/DL (ref 2.5–4.5)
PLATELET # BLD AUTO: 102 10E3/UL (ref 150–450)
POTASSIUM BLD-SCNC: 4.9 MMOL/L (ref 3.5–5)
PROT SERPL-MCNC: 7 G/DL (ref 6–8)
RBC # BLD AUTO: 2.21 10E6/UL (ref 4.4–5.9)
SODIUM SERPL-SCNC: 132 MMOL/L (ref 136–145)
WBC # BLD AUTO: 9.9 10E3/UL (ref 4–11)

## 2022-06-19 PROCEDURE — 85027 COMPLETE CBC AUTOMATED: CPT | Performed by: FAMILY MEDICINE

## 2022-06-19 PROCEDURE — 80053 COMPREHEN METABOLIC PANEL: CPT | Performed by: FAMILY MEDICINE

## 2022-06-19 PROCEDURE — 97166 OT EVAL MOD COMPLEX 45 MIN: CPT | Mod: GO

## 2022-06-19 PROCEDURE — 250N000011 HC RX IP 250 OP 636: Performed by: INTERNAL MEDICINE

## 2022-06-19 PROCEDURE — 85610 PROTHROMBIN TIME: CPT | Performed by: FAMILY MEDICINE

## 2022-06-19 PROCEDURE — 97116 GAIT TRAINING THERAPY: CPT | Mod: GP

## 2022-06-19 PROCEDURE — 250N000009 HC RX 250: Performed by: INTERNAL MEDICINE

## 2022-06-19 PROCEDURE — 97535 SELF CARE MNGMENT TRAINING: CPT | Mod: GO

## 2022-06-19 PROCEDURE — 250N000011 HC RX IP 250 OP 636: Performed by: FAMILY MEDICINE

## 2022-06-19 PROCEDURE — 36415 COLL VENOUS BLD VENIPUNCTURE: CPT | Performed by: FAMILY MEDICINE

## 2022-06-19 PROCEDURE — 250N000013 HC RX MED GY IP 250 OP 250 PS 637: Performed by: FAMILY MEDICINE

## 2022-06-19 PROCEDURE — 97162 PT EVAL MOD COMPLEX 30 MIN: CPT | Mod: GP

## 2022-06-19 PROCEDURE — 120N000001 HC R&B MED SURG/OB

## 2022-06-19 PROCEDURE — 250N000013 HC RX MED GY IP 250 OP 250 PS 637: Performed by: INTERNAL MEDICINE

## 2022-06-19 PROCEDURE — 83735 ASSAY OF MAGNESIUM: CPT | Performed by: FAMILY MEDICINE

## 2022-06-19 PROCEDURE — 99233 SBSQ HOSP IP/OBS HIGH 50: CPT | Performed by: FAMILY MEDICINE

## 2022-06-19 PROCEDURE — 84100 ASSAY OF PHOSPHORUS: CPT | Performed by: FAMILY MEDICINE

## 2022-06-19 PROCEDURE — 250N000012 HC RX MED GY IP 250 OP 636 PS 637: Performed by: FAMILY MEDICINE

## 2022-06-19 RX ADMIN — LACTULOSE 20 G: 10 SOLUTION ORAL at 08:01

## 2022-06-19 RX ADMIN — Medication 400 MG: at 20:04

## 2022-06-19 RX ADMIN — INSULIN ASPART 3 UNITS: 100 INJECTION, SOLUTION INTRAVENOUS; SUBCUTANEOUS at 08:11

## 2022-06-19 RX ADMIN — INSULIN ASPART 2 UNITS: 100 INJECTION, SOLUTION INTRAVENOUS; SUBCUTANEOUS at 12:12

## 2022-06-19 RX ADMIN — THERA TABS 1 TABLET: TAB at 07:58

## 2022-06-19 RX ADMIN — RIFAXIMIN 550 MG: 550 TABLET ORAL at 07:58

## 2022-06-19 RX ADMIN — INSULIN ASPART 2 UNITS: 100 INJECTION, SOLUTION INTRAVENOUS; SUBCUTANEOUS at 17:38

## 2022-06-19 RX ADMIN — Medication 100 MG: at 08:01

## 2022-06-19 RX ADMIN — PANTOPRAZOLE SODIUM 40 MG: 20 TABLET, DELAYED RELEASE ORAL at 22:11

## 2022-06-19 RX ADMIN — INSULIN GLARGINE 15 UNITS: 100 INJECTION, SOLUTION SUBCUTANEOUS at 09:01

## 2022-06-19 RX ADMIN — LACTULOSE 20 G: 10 SOLUTION ORAL at 13:52

## 2022-06-19 RX ADMIN — Medication 400 MG: at 07:58

## 2022-06-19 RX ADMIN — Medication 1 MG: at 01:20

## 2022-06-19 RX ADMIN — ONDANSETRON 4 MG: 2 INJECTION INTRAMUSCULAR; INTRAVENOUS at 08:24

## 2022-06-19 RX ADMIN — SPIRONOLACTONE 25 MG: 25 TABLET, FILM COATED ORAL at 12:13

## 2022-06-19 RX ADMIN — FUROSEMIDE 40 MG: 40 TABLET ORAL at 08:01

## 2022-06-19 RX ADMIN — FLUOXETINE 40 MG: 20 CAPSULE ORAL at 22:13

## 2022-06-19 RX ADMIN — SPIRONOLACTONE 25 MG: 25 TABLET, FILM COATED ORAL at 08:01

## 2022-06-19 RX ADMIN — PHYTONADIONE 10 MG: 10 INJECTION, EMULSION INTRAMUSCULAR; INTRAVENOUS; SUBCUTANEOUS at 08:01

## 2022-06-19 RX ADMIN — RIFAXIMIN 550 MG: 550 TABLET ORAL at 22:11

## 2022-06-19 RX ADMIN — FUROSEMIDE 40 MG: 40 TABLET ORAL at 20:04

## 2022-06-19 RX ADMIN — FOLIC ACID 1 MG: 1 TABLET ORAL at 07:58

## 2022-06-19 RX ADMIN — PANTOPRAZOLE SODIUM 40 MG: 20 TABLET, DELAYED RELEASE ORAL at 07:58

## 2022-06-19 ASSESSMENT — ACTIVITIES OF DAILY LIVING (ADL)
ADLS_ACUITY_SCORE: 43
ADLS_ACUITY_SCORE: 47
ADLS_ACUITY_SCORE: 43
ADLS_ACUITY_SCORE: 43
ADLS_ACUITY_SCORE: 37
ADLS_ACUITY_SCORE: 43
ADLS_ACUITY_SCORE: 47
ADLS_ACUITY_SCORE: 43
ADLS_ACUITY_SCORE: 47
ADLS_ACUITY_SCORE: 43
ADLS_ACUITY_SCORE: 47
ADLS_ACUITY_SCORE: 37

## 2022-06-19 NOTE — PLAN OF CARE
Goal Outcome Evaluation:    Plan of Care Reviewed With: patient, mother     Overall Patient Progress: no change    Alert and oriented x 4, VSS on RA, slightly confused at the beginning of the shift but cleared up, denies pain, dizziness, lightheadedness, nausea, vomiting, or abd discomfort, mother stayed overnight, up with one, had one med formed brown BM, bleeding in gums, oral cares were performed, patient accidentally pulled out left PIV while sleeping, right PIV SL, recent Hgb 7.2

## 2022-06-19 NOTE — PROGRESS NOTES
"GI PROGRESS NOTE  6/19/2022  Dillon Salter  1995  /-28    Subjective:  He feels much better today and was up walking in the [place] with physical therapy.  He denies having abdominal pain.  Confusion is much improved.  He currently denies having a cough or shortness of breath.  One brown stool was charted so far today.     Objective:    Patient Vitals for the past 24 hrs:   BP Temp Temp src Pulse Resp SpO2 Height Weight   06/19/22 0830 (!) 141/85 97.7  F (36.5  C) Oral 101 18 99 % -- --   06/18/22 2338 131/62 97.9  F (36.6  C) Oral 90 18 99 % -- --   06/18/22 1700 120/70 97.9  F (36.6  C) Oral 81 18 99 % -- --   06/18/22 1550 123/69 97.3  F (36.3  C) Oral 84 16 98 % -- --   06/18/22 1511 135/82 97.3  F (36.3  C) Oral 87 14 99 % -- --   06/18/22 1430 105/55 -- -- 83 16 100 % -- --   06/18/22 1415 104/59 -- -- 83 10 100 % -- --   06/18/22 1400 111/56 -- -- 83 9 100 % -- --   06/18/22 1345 119/60 -- -- 84 10 100 % -- --   06/18/22 1330 120/59 -- -- 82 11 100 % -- --   06/18/22 1315 107/58 -- -- 81 9 100 % -- --   06/18/22 1300 111/56 -- -- 80 10 100 % -- --   06/18/22 1230 118/62 -- -- 80 10 100 % -- --   06/18/22 1215 116/63 -- -- 80 10 100 % -- --   06/18/22 1200 114/59 -- -- 79 12 100 % -- --   06/18/22 1145 119/61 -- -- 79 (!) 6 100 % -- --   06/18/22 1130 128/73 -- -- 84 12 100 % -- --   06/18/22 1115 130/71 -- -- 87 14 97 % -- --   06/18/22 1100 122/69 -- -- 83 10 100 % -- --   06/18/22 1045 124/67 -- -- 83 11 100 % -- --   06/18/22 1030 129/74 -- -- 85 8 100 % -- --   06/18/22 1026 129/74 -- -- 84 12 100 % 1.651 m (5' 5\") 56.7 kg (125 lb)     Body mass index is 20.8 kg/m .  Gen: NAD, jaundice  Lungs: clear  GI: Non-distended, BS positive, soft, non-tender    Laboratory  Recent Labs   Lab Test 06/19/22  0644 06/18/22  1038 06/18/22  1035 06/16/22  1410 06/05/22  0006 06/04/22  0842   WBC 9.9  --  11.3* 11.0   < >  --    HGB 7.2*  --  7.4* 7.9*   < >  --    MCV 94  --  96 94   < >  --    *  -- "  114* 138*   < >  --    INR 1.66* 1.71*  --   --   --  1.82*    < > = values in this interval not displayed.     Recent Labs   Lab Test 06/19/22  0740 06/19/22  0644 06/18/22 2202 06/18/22  1110 06/18/22  1035 06/14/22  1903   NA  --  132*  --   --  133* 130*   POTASSIUM  --  4.9  --   --  4.1 4.6   CHLORIDE  --  98  --   --  93* 93*   CO2  --  27  --   --  32* 30   BUN  --  22  --   --  30* 22   CR  --  0.61*  --   --  0.72 0.72   ANIONGAP  --  7  --   --  8 7   SARTHAK  --  9.1  --   --  9.6 9.1   * 189* 124*   < > 64* 280*    < > = values in this interval not displayed.     Recent Labs   Lab Test 06/19/22  0644 06/18/22  1036 06/18/22  1035 06/14/22  1903 05/24/22  0619 05/23/22  2300 04/07/22  0624 04/06/22  2307   ALBUMIN 2.6*  --  3.1* 3.0*   < >  --    < > 3.7   BILITOTAL 13.5*  --  15.1* 15.2*   < >  --    < > 11.7*   ALT 40  --  47* 41   < >  --    < > 131*   *  --  119* 107*   < >  --    < > 237*   ALKPHOS 172*  --  203* 188*   < >  --    < > 158*   PROTEIN  --  Negative  --   --   --  Negative  --   --    LIPASE  --   --  81* 77*  --   --   --  45    < > = values in this interval not displayed.     UA unremarkable  Blood cultures pending    US Abdomen Limited    Result Date: 6/18/2022  EXAM: US ABDOMEN LIMITED LOCATION: [facility] DATE/TIME: 6/18/2022 6:07 PM INDICATION:. Alcoholic cirrhosis. Status change. assess for eough ascites for paracentesis COMPARISON: 5/31/2022 TECHNIQUE: Limited abdominal ultrasound. FINDINGS: Limited exam performed targeting the entire peritoneal cavity evaluating for ascites volume. No ascites identified. No paracentesis performed.     IMPRESSION: 1.  No ascites identified.     XR Chest Port 1 View    Result Date: 6/18/2022  EXAM: XR CHEST PORT 1 VIEW LOCATION: [facility] DATE/TIME: 6/18/2022 6:59 PM INDICATION: Altered mental status, fatigue, vomiting COMPARISON: 5/23/2022     IMPRESSION: Patchy right infrahilar  opacities favored to represent atelectasis although developing infection is in the differential. Left lung is clear. No effusions. Heart size is normal.    Assessment:  1.  Alcoholic cirrhosis, sober since early April 2022 and admitted with acute mental status change related to hepatic encephalopathy.  Meld score is stable at 26 currently.  No obvious sign of infection at this time though question of patchy opacity on chest x-ray.  No clinical GI bleeding but he has had oozing from his gums.  On previous admission he had fairly significant anasarca and lower extremity edema which is now improved and he remains on diuretics.  No sign of ascites on ultrasound, and he has never required paracentesis.  2.  Hepatic encephalopathy, restarted on home lactulose 3 times daily.  Here we added rifaximin 550 mg twice daily.  Clinically much improved today.  3.  History of esophageal varices with variceal bleed and banding May 24, 2022.  Repeat EGD planned to reassess varices, currently scheduled as an outpatient June 21 but might consider doing tomorrow as an add-on procedure if the GI team can schedule.  Consider starting nonselective beta-blocker if infection is ruled out.  4.  Anemia, likely multifactorial.  He does have significant coagulopathy and thrombocytopenia related to liver disease but also history of hemolysis.  He has had problems with persistent drop in hemoglobin despite lack of overt GI bleeding.  Hematology saw him recently as an outpatient.    Patient Active Problem List   Diagnosis     Bleeding gums     Alcoholic cirrhosis of liver with ascites (H)     Anemia due to blood loss, acute     Diabetes mellitus type 2 in obese (H)     Alcohol intoxication (H)     Open abdominal wall wound     Asperger's disorder     Benign essential hypertension     Anemia of chronic illness     Alcohol withdrawal, uncomplicated (H)     Alcoholic hepatitis     Anxiety and depression     Balanitis     Encephalopathy     Alcoholic  cirrhosis of liver without ascites (H)     Anemia, unspecified type     Hematemesis, presence of nausea not specified     Acute posthemorrhagic anemia     Bleeding esophageal varices (H)     Hepatic encephalopathy (H)     Anemia in other chronic diseases classified elsewhere        Plan:    1.  Low-sodium diet today, n.p.o. at midnight for possible EGD tomorrow.  Or it can be 6/21 as scheduled (cannot be done in outpatient endo center due to comorbidities).  2.  Trend MELD labs.  3.  Encourage continued sobriety.  4.  Outpatient hepatology follow-up is currently scheduled with [facility].    Deysi Jauregui PA-C  [facility]  [phone]

## 2022-06-19 NOTE — PROGRESS NOTES
[facility] MEDICINE PROGRESS NOTE      Identification/Summary: Dillon Salter is a 26 year old male with a past medical history of  chronic anemia secondary to variceal bleeding, alcohol abuse and dependence, sober since 4/2022, alcoholic liver cirrhosis, portal hypertension, DM 2, anxiety, recent hospitalization on 5/23-6/7 with hepatic encephalopathy, MSSA bacteremia, anemia, underwent EGD on May/24 required esophageal varices banding with complete eradication, came with worsening confusion, weakness. Found to have hemoglobin 7.4.  Received 1 unit of packed RBC transfusion.  Hemoglobin remained stable at 7.2.  No overt GI bleeding.  Vitamin K 5 mg daily for 3-day for coagulopathy from liver disease. Scheduled for outpatient EGD on 6/21.  Abdominal ultrasound revealed no ascites.  Will resume diuretic and low-salt diet.  He is on lactulose 4 times a day.  His encephalopathy is much improved.  Getting PT and OT.  Lives with his mother.  Anticipated discharge in 1 day.    Assessment and Plan:  Acute metabolic and toxic encephalopathy  Hepatic encephalopathy  Resume lactulose 20 g 4 times a day and  Confusion cleared  No focal deficit    Acute on chronic anemia, no overt GI bleeding  Status post 1 unit of packed RBC transfusion    Esophageal varices, status post esophageal varices banding on 5/24 with complete eradication  Repeat EGD on 6/21  Resume PPI    Coagulopathy from liver disease, vitamin K 5 mg daily for 3-day  No active GI bleeding at present  Evaluated by hematology and gastroenterology in last hospitalization    Alcoholic liver cirrhosis  Portal hypertension  No ascites on ultrasound, Resume low-salt diet, Lasix 40 mg twice a day, spironolactone 25 mg 3 times a day   Coagulopathy from liver disease, vitamin K 5 mg daily for 3-day  Hyponatremia  Bilirubinemia at 10  Splenomegaly  History of heavy alcohol use for 9 years  Quit drinking alcohol since 4/2022  Encouraged to maintain  sobriety  Rifixamin 500 mg BID    Generalized weakness and deconditioning  Supportive care  Physical and Occupational Therapy     DM2  Hypoglycemia on admission  Diabetic diet and insulin sliding scale  Lantus 15 unit daily, lower dose     Severe protein calorie malnutrition  Increase p.o. protein intake     Depression  Fluoxetine 40 mg daily     Code full  DVT prophylaxis  Early ambulation and SCDs  Low risk as coagulopathy and thrombocytopenia    Barrier to discharge  Awaiting for more clinical improvement.  Anticipated discharge in 1 day      Elizabeth Guaman MD  [facility]  Phone: #[phone]    Interval History/Subjective:  Resting comfortably.  Feeling much better today.  No further confusion.  Very positive to maintain sobriety from alcohol use.  Weakness is slowly improving.  Denies fever, abdominal pain.  Appetite is improving.  Mother is present.    Physical Exam/Objective:  Temp:  [97.3  F (36.3  C)-97.9  F (36.6  C)] 97.7  F (36.5  C)  Pulse:  [] 101  Resp:  [6-18] 18  BP: (104-141)/(55-85) 141/85  SpO2:  [97 %-100 %] 99 %  Body mass index is 20.8 kg/m .     GENERAL:  Alert, appears comfortable, in no acute distress, appears stated age   HEAD:  Normocephalic, without obvious abnormality, atraumatic   EYES:  PERRL, scleral icterus, EOM's intact   NOSE: Nares normal,  mucosa normal, no drainage   THROAT: Lips, mucosa, gums normal, mouth moist   NECK: Supple, symmetrical, trachea midline   BACK:   Symmetric, no curvature, ROM normal   LUNGS:   Clear to auscultation bilaterally, no rales, rhonchi, or wheezing, symmetric chest rise on inhalation, respirations unlabored   CHEST WALL:  No tenderness or deformity   HEART:  Regular rate and rhythm, S1 and S2 normal, no murmur    ABDOMEN:   Soft, non-tender, bowel sounds active , no masses, no organomegaly, no rebound or guarding, no ascites on ultrasound   EXTREMITIES: No  edema    SKIN: Jaundiced    NEURO: Alert, oriented x3, moves all four extremities freely   PSYCH: Cooperative, behavior is appropriate      Data reviewed today: I personally reviewed all new medications, labs, imaging/diagnostics reports over the past 24 hours. Pertinent findings include:    Imaging:   CXR  Patchy right infrahilar opacities favored to represent atelectasis although developing infection is in the differential. Left lung is clear. No effusions. Heart size is normal.    ABDOMEN US  No ascites identified.    Labs:  Most Recent 3 CBC's:Recent Labs   Lab Test 06/19/22  0644 06/18/22  1035 06/16/22  1410   WBC 9.9 11.3* 11.0   HGB 7.2* 7.4* 7.9*   MCV 94 96 94   * 114* 138*     Most Recent 3 BMP's:Recent Labs   Lab Test 06/19/22  0740 06/19/22  0644 06/18/22  2202 06/18/22  1110 06/18/22  1035 06/14/22  1903   NA  --  132*  --   --  133* 130*   POTASSIUM  --  4.9  --   --  4.1 4.6   CHLORIDE  --  98  --   --  93* 93*   CO2  --  27  --   --  32* 30   BUN  --  22  --   --  30* 22   CR  --  0.61*  --   --  0.72 0.72   ANIONGAP  --  7  --   --  8 7   SARTHAK  --  9.1  --   --  9.6 9.1   * 189* 124*   < > 64* 280*    < > = values in this interval not displayed.     Most Recent 2 LFT's:Recent Labs   Lab Test 06/19/22  0644 06/18/22  1035   * 119*   ALT 40 47*   ALKPHOS 172* 203*   BILITOTAL 13.5* 15.1*     Most Recent 3 INR's:Recent Labs   Lab Test 06/19/22  0644 06/18/22  1038 06/04/22  0842   INR 1.66* 1.71* 1.82*       Medications:   Personally Reviewed.  Medications       FLUoxetine  40 mg Oral At Bedtime     folic acid  1 mg Oral Daily     furosemide  40 mg Oral BID     insulin aspart  1-7 Units Subcutaneous TID AC     insulin aspart  1-5 Units Subcutaneous At Bedtime     insulin glargine  15 Units Subcutaneous QAM AC     lactulose  20 g Oral TID     magnesium oxide  400 mg Oral BID     multivitamin, therapeutic  1 tablet Oral Daily     pantoprazole  40 mg Oral BID     phytonadione  10 mg Oral Daily     rifaximin   550 mg Oral BID     sodium chloride (PF)  3 mL Intracatheter Q8H     spironolactone  25 mg Oral TID     thiamine  100 mg Oral Daily

## 2022-06-19 NOTE — PROGRESS NOTES
06/19/22 0930   Quick Adds   Type of Visit Initial PT Evaluation   Living Environment   People in Home parent(s)   Current Living Arrangements house   Home Accessibility stairs to enter home;stairs within home   Number of Stairs, Main Entrance 2;10   Stair Railings, Main Entrance none;railing on left side (ascending)   Number of Stairs, Within Home, Primary seven   Stair Railings, Within Home, Primary railing on left side (ascending)   Self-Care   Usual Activity Tolerance moderate   Current Activity Tolerance moderate   Equipment Currently Used at Home none   Fall history within last six months no   General Information   Onset of Illness/Injury or Date of Surgery 06/18/22   Patient/Family Therapy Goals Statement (PT) return to home with parental help   Strength (Manual Muscle Testing)   Strength (Manual Muscle Testing) Deficits observed during functional mobility   Strength Comments general deconditioning   Bed Mobility   Bed Mobility supine-sit;rolling right   Rolling Right Power (Bed Mobility) contact guard  (on couch bed)   Supine-Sit Power (Bed Mobility) contact guard;verbal cues   Impairments Contributing to Impaired Bed Mobility decreased strength   Transfers   Transfers sit-stand transfer   Impairments Contributing to Impaired Transfers decreased strength;impaired balance   Sit-Stand Transfer   Sit-Stand Power (Transfers) supervision   Comment, (Sit-Stand Transfer) wide YUMI, increased effort   Gait/Stairs (Locomotion)   Power Level (Gait) supervision   Assistive Device (Gait)   (none)   Distance in Feet (Required for LE Total Joints) 500   Pattern (Gait) swing-to   Deviations/Abnormal Patterns (Gait) base of support, wide;jazmyn decreased;gait speed decreased;stride length decreased;weight shifting decreased   Comment, (Gait/Stairs) stairs limited by fatigue/weakness, pt declined at eval   Balance   Balance Comments fair dynamic stand balance   Clinical Impression   Criteria  for Skilled Therapeutic Intervention Yes, treatment indicated   PT Diagnosis (PT) impaired functional mobility   Influenced by the following impairments transfers, gait, stairs   Functional limitations due to impairments weakness, balance impairment   Clinical Presentation (PT Evaluation Complexity) Evolving/Changing   Clinical Presentation Rationale presents as diagnosed   Clinical Decision Making (Complexity) moderate complexity   Planned Therapy Interventions (PT) balance training;gait training;home exercise program;patient/family education;stair training;strengthening;transfer training;home program guidelines   Anticipated Equipment Needs at Discharge (PT)   (TBD)   Risk & Benefits of therapy have been explained evaluation/treatment results reviewed;care plan/treatment goals reviewed;patient;mother   PT Discharge Planning   PT Discharge Recommendation (DC Rec) home with assist;home with outpatient physical therapy   PT Rationale for DC Rec strengthening/conditioning to dec fall risk   Plan of Care Review   Plan of Care Reviewed With patient;mother   Total Evaluation Time   Total Evaluation Time (Minutes) 15   Physical Therapy Goals   PT Frequency Daily   PT Predicted Duration/Target Date for Goal Attainment 06/23/22   PT Goals Transfers;Gait;Stairs   PT: Transfers Independent;Sit to/from stand   PT: Gait Independent;Greater than 200 feet   PT: Stairs Modified independent;8 stairs;Rail on both sides

## 2022-06-19 NOTE — PROGRESS NOTES
06/19/22 1315   Living Environment   People in Home parent(s)   Current Living Arrangements house   Living Environment Comments WIS with hand held sprayer with GB, Std toilet.   Self-Care   Usual Activity Tolerance moderate   Current Activity Tolerance moderate   General Information   Onset of Illness/Injury or Date of Surgery 06/18/22   Referring Physician Dr. Elizabeth Guaman   Patient/Family Therapy Goal Statement (OT) To return home  with help from parents   Cognitive Status Examination   Orientation Status orientation to person, place and time   Visual Perception   Visual Impairment/Limitations corrective lenses full-time   Sensory   Sensory Comments No c/o numbness or tingling in either hand   Range of Motion Comprehensive   General Range of Motion upper extremity range of motion deficits identified   General Upper Extremity Assessment (Range of Motion)   Comment: Upper Extremity ROM shoulder flex 160 flex, elbow  flex 140, supination to 10-15 degrees.   Upper Extremity: Range of Motion shoulder, left: UE ROM;shoulder, right: UE ROM;elbow/forearm, left: UE ROM;elbow/forearm, right: UE ROM   Strength Comprehensive (MMT)   General Manual Muscle Testing (MMT) Assessment upper extremity strength deficits identified   Comment, General Manual Muscle Testing (MMT) Assessment R shoulder flex 3+/5, general weakness throughout.  R U/E weaker than the L U/E   Bed Mobility   Bed Mobility supine-sit   Supine-Sit [place] (Bed Mobility) contact guard   Assistive Device (Bed Mobility) bed rails   Comment (Bed Mobility) With HOB elevated.   Transfers   Transfers sit-stand transfer;bed-chair transfer;toilet transfer   Transfer Skill: Bed to Chair/Chair to Bed   Bed-Chair [place] (Transfers) contact guard   Sit-Stand Transfer   Sit-Stand [place] (Transfers) contact guard   Toilet Transfer   Type (Toilet Transfer) sit-stand;stand-sit   [place] Level (Toilet Transfer) contact guard   Assistive Device (Toilet  Transfer) grab bars/safety frame   Balance   Balance Assessment standing balance: dynamic   Balance Comments good   Activities of Daily Living   BADL Assessment/Intervention toileting;grooming;lower body dressing   Lower Body Dressing Assessment/Training   Position (Lower Body Dressing) supported sitting   [place] Level (Lower Body Dressing) contact guard assist   Grooming Assessment/Training   Position (Grooming) sink side   [place] Level (Grooming) contact guard assist   Toileting   Position (Toileting) unsupported sitting;unsupported standing   Assistive Devices (Toileting) grab bar, toilet   Comment, (Toileting) Able to manage clothing after toileting with CGA for safety due to weakness.  No output so Pt did not try jalen care   [place] Level (Toileting) contact guard assist   Clinical Impression   Criteria for Skilled Therapeutic Interventions Met (OT) Yes, treatment indicated   OT Diagnosis decreased indep with ADLs  due to anemia, hepatic encephalopathy   OT Problem List-Impairments impacting ADL activity tolerance impaired;mobility;strength   Assessment of Occupational Performance 3-5 Performance Deficits   Identified Performance Deficits dressing, toileting, G/H, trsfs, bathing.   Planned Therapy Interventions (OT) ADL retraining   Clinical Decision Making Complexity (OT) moderate complexity   Risk & Benefits of therapy have been explained patient;mother   OT Discharge Planning   OT Discharge Recommendation (DC Rec) (S)  home with assist   OT Rationale for DC Rec Pt expressed serveral times that his parents are his main support at home.   Total Evaluation Time (Minutes)   Total Evaluation Time (Minutes) 15   OT Goals   Therapy Frequency (OT) Daily   OT Predicted Duration/Target Date for Goal Attainment 06/24/22   OT Goals Lower Body Dressing;Transfers;Toilet Transfer/Toileting;Hygiene/Grooming;OT Goal 1   OT: Hygiene/Grooming supervision/stand-by assist   OT: Lower Body Dressing Independent    OT: Transfer Independent   OT: Toilet Transfer/Toileting Independent   OT: Goal 1 Pt will be indep with his home exercise program to strengthen his bilat U/E's to assist with ADLs and trsfs.

## 2022-06-19 NOTE — PROVIDER NOTIFICATION
MF   patient asking for trazodone for sleep. please advise.     called and said with elevated liver values, it is unsafe to use.

## 2022-06-20 ENCOUNTER — APPOINTMENT (OUTPATIENT)
Dept: PHYSICAL THERAPY | Facility: CLINIC | Age: 27
DRG: 441 | End: 2022-06-20
Payer: COMMERCIAL

## 2022-06-20 ENCOUNTER — ANESTHESIA EVENT (OUTPATIENT)
Dept: SURGERY | Facility: CLINIC | Age: 27
DRG: 441 | End: 2022-06-20
Payer: COMMERCIAL

## 2022-06-20 ENCOUNTER — APPOINTMENT (OUTPATIENT)
Dept: OCCUPATIONAL THERAPY | Facility: CLINIC | Age: 27
DRG: 441 | End: 2022-06-20
Payer: COMMERCIAL

## 2022-06-20 ENCOUNTER — ANESTHESIA (OUTPATIENT)
Dept: SURGERY | Facility: CLINIC | Age: 27
DRG: 441 | End: 2022-06-20
Payer: COMMERCIAL

## 2022-06-20 VITALS
RESPIRATION RATE: 16 BRPM | OXYGEN SATURATION: 100 % | DIASTOLIC BLOOD PRESSURE: 65 MMHG | HEIGHT: 65 IN | WEIGHT: 125 LBS | HEART RATE: 88 BPM | BODY MASS INDEX: 20.83 KG/M2 | TEMPERATURE: 98.1 F | SYSTOLIC BLOOD PRESSURE: 134 MMHG

## 2022-06-20 LAB
ALBUMIN SERPL-MCNC: 2.8 G/DL (ref 3.5–5)
ALP SERPL-CCNC: 212 U/L (ref 45–120)
ALT SERPL W P-5'-P-CCNC: 42 U/L (ref 0–45)
ANION GAP SERPL CALCULATED.3IONS-SCNC: 9 MMOL/L (ref 5–18)
AST SERPL W P-5'-P-CCNC: 109 U/L (ref 0–40)
BASOPHILS # BLD MANUAL: 0.2 10E3/UL (ref 0–0.2)
BASOPHILS NFR BLD MANUAL: 2 %
BILIRUB SERPL-MCNC: 13.6 MG/DL (ref 0–1)
BUN SERPL-MCNC: 21 MG/DL (ref 8–22)
CALCIUM SERPL-MCNC: 8.8 MG/DL (ref 8.5–10.5)
CHLORIDE BLD-SCNC: 96 MMOL/L (ref 98–107)
CO2 SERPL-SCNC: 28 MMOL/L (ref 22–31)
CREAT SERPL-MCNC: 0.62 MG/DL (ref 0.7–1.3)
EOSINOPHIL # BLD MANUAL: 0.4 10E3/UL (ref 0–0.7)
EOSINOPHIL NFR BLD MANUAL: 4 %
ERYTHROCYTE [DISTWIDTH] IN BLOOD BY AUTOMATED COUNT: 21.8 % (ref 10–15)
GFR SERPL CREATININE-BSD FRML MDRD: >90 ML/MIN/1.73M2
GLUCOSE BLD-MCNC: 180 MG/DL (ref 70–125)
GLUCOSE BLDC GLUCOMTR-MCNC: 125 MG/DL (ref 70–99)
GLUCOSE BLDC GLUCOMTR-MCNC: 169 MG/DL (ref 70–99)
GLUCOSE BLDC GLUCOMTR-MCNC: 309 MG/DL (ref 70–99)
HCT VFR BLD AUTO: 21 % (ref 40–53)
HGB BLD-MCNC: 7.1 G/DL (ref 13.3–17.7)
INR PPP: 1.51 (ref 0.85–1.15)
LYMPHOCYTES # BLD MANUAL: 3.4 10E3/UL (ref 0.8–5.3)
LYMPHOCYTES NFR BLD MANUAL: 33 %
MCH RBC QN AUTO: 33 PG (ref 26.5–33)
MCHC RBC AUTO-ENTMCNC: 33.8 G/DL (ref 31.5–36.5)
MCV RBC AUTO: 98 FL (ref 78–100)
METAMYELOCYTES # BLD MANUAL: 0.1 10E3/UL
METAMYELOCYTES NFR BLD MANUAL: 1 %
MONOCYTES # BLD MANUAL: 0.6 10E3/UL (ref 0–1.3)
MONOCYTES NFR BLD MANUAL: 6 %
NEUTROPHILS # BLD MANUAL: 5.5 10E3/UL (ref 1.6–8.3)
NEUTROPHILS NFR BLD MANUAL: 54 %
PLAT MORPH BLD: ABNORMAL
PLATELET # BLD AUTO: 111 10E3/UL (ref 150–450)
POTASSIUM BLD-SCNC: 4.2 MMOL/L (ref 3.5–5)
PROT SERPL-MCNC: 7.3 G/DL (ref 6–8)
RBC # BLD AUTO: 2.15 10E6/UL (ref 4.4–5.9)
RBC MORPH BLD: ABNORMAL
SODIUM SERPL-SCNC: 133 MMOL/L (ref 136–145)
UPPER GI ENDOSCOPY: NORMAL
WBC # BLD AUTO: 10.2 10E3/UL (ref 4–11)

## 2022-06-20 PROCEDURE — 85014 HEMATOCRIT: CPT | Performed by: PHYSICIAN ASSISTANT

## 2022-06-20 PROCEDURE — 36415 COLL VENOUS BLD VENIPUNCTURE: CPT | Performed by: PHYSICIAN ASSISTANT

## 2022-06-20 PROCEDURE — 370N000017 HC ANESTHESIA TECHNICAL FEE, PER MIN: Performed by: INTERNAL MEDICINE

## 2022-06-20 PROCEDURE — 250N000013 HC RX MED GY IP 250 OP 250 PS 637: Performed by: FAMILY MEDICINE

## 2022-06-20 PROCEDURE — 250N000011 HC RX IP 250 OP 636: Performed by: ANESTHESIOLOGY

## 2022-06-20 PROCEDURE — 97535 SELF CARE MNGMENT TRAINING: CPT | Mod: GO

## 2022-06-20 PROCEDURE — 99239 HOSP IP/OBS DSCHRG MGMT >30: CPT | Performed by: FAMILY MEDICINE

## 2022-06-20 PROCEDURE — 272N000001 HC OR GENERAL SUPPLY STERILE: Performed by: INTERNAL MEDICINE

## 2022-06-20 PROCEDURE — 250N000009 HC RX 250: Performed by: INTERNAL MEDICINE

## 2022-06-20 PROCEDURE — 85610 PROTHROMBIN TIME: CPT | Performed by: PHYSICIAN ASSISTANT

## 2022-06-20 PROCEDURE — 80053 COMPREHEN METABOLIC PANEL: CPT | Performed by: PHYSICIAN ASSISTANT

## 2022-06-20 PROCEDURE — 97110 THERAPEUTIC EXERCISES: CPT | Mod: GO

## 2022-06-20 PROCEDURE — 999N000141 HC STATISTIC PRE-PROCEDURE NURSING ASSESSMENT: Performed by: INTERNAL MEDICINE

## 2022-06-20 PROCEDURE — 85007 BL SMEAR W/DIFF WBC COUNT: CPT | Performed by: PHYSICIAN ASSISTANT

## 2022-06-20 PROCEDURE — 97116 GAIT TRAINING THERAPY: CPT | Mod: GP | Performed by: PHYSICAL THERAPIST

## 2022-06-20 PROCEDURE — 250N000011 HC RX IP 250 OP 636: Performed by: INTERNAL MEDICINE

## 2022-06-20 PROCEDURE — 250N000009 HC RX 250: Performed by: ANESTHESIOLOGY

## 2022-06-20 PROCEDURE — 250N000013 HC RX MED GY IP 250 OP 250 PS 637: Performed by: INTERNAL MEDICINE

## 2022-06-20 PROCEDURE — 258N000003 HC RX IP 258 OP 636: Performed by: ANESTHESIOLOGY

## 2022-06-20 PROCEDURE — 360N000075 HC SURGERY LEVEL 2, PER MIN: Performed by: INTERNAL MEDICINE

## 2022-06-20 PROCEDURE — 0DJ08ZZ INSPECTION OF UPPER INTESTINAL TRACT, VIA NATURAL OR ARTIFICIAL OPENING ENDOSCOPIC: ICD-10-PCS | Performed by: INTERNAL MEDICINE

## 2022-06-20 PROCEDURE — 250N000011 HC RX IP 250 OP 636: Performed by: FAMILY MEDICINE

## 2022-06-20 RX ORDER — ONDANSETRON 4 MG/1
4 TABLET, ORALLY DISINTEGRATING ORAL EVERY 30 MIN PRN
Status: DISCONTINUED | OUTPATIENT
Start: 2022-06-20 | End: 2022-06-20 | Stop reason: HOSPADM

## 2022-06-20 RX ORDER — LIDOCAINE 40 MG/G
CREAM TOPICAL
Status: DISCONTINUED | OUTPATIENT
Start: 2022-06-20 | End: 2022-06-20 | Stop reason: HOSPADM

## 2022-06-20 RX ORDER — HYDROMORPHONE HCL IN WATER/PF 6 MG/30 ML
0.2 PATIENT CONTROLLED ANALGESIA SYRINGE INTRAVENOUS EVERY 5 MIN PRN
Status: DISCONTINUED | OUTPATIENT
Start: 2022-06-20 | End: 2022-06-20 | Stop reason: HOSPADM

## 2022-06-20 RX ORDER — FENTANYL CITRATE 50 UG/ML
25 INJECTION, SOLUTION INTRAMUSCULAR; INTRAVENOUS EVERY 5 MIN PRN
Status: DISCONTINUED | OUTPATIENT
Start: 2022-06-20 | End: 2022-06-20 | Stop reason: HOSPADM

## 2022-06-20 RX ORDER — SODIUM CHLORIDE, SODIUM LACTATE, POTASSIUM CHLORIDE, CALCIUM CHLORIDE 600; 310; 30; 20 MG/100ML; MG/100ML; MG/100ML; MG/100ML
INJECTION, SOLUTION INTRAVENOUS CONTINUOUS
Status: DISCONTINUED | OUTPATIENT
Start: 2022-06-20 | End: 2022-06-20 | Stop reason: HOSPADM

## 2022-06-20 RX ORDER — PROPOFOL 10 MG/ML
INJECTION, EMULSION INTRAVENOUS CONTINUOUS PRN
Status: DISCONTINUED | OUTPATIENT
Start: 2022-06-20 | End: 2022-06-20

## 2022-06-20 RX ORDER — OXYCODONE HYDROCHLORIDE 5 MG/1
5 TABLET ORAL EVERY 4 HOURS PRN
Status: DISCONTINUED | OUTPATIENT
Start: 2022-06-20 | End: 2022-06-20 | Stop reason: HOSPADM

## 2022-06-20 RX ORDER — LIDOCAINE HYDROCHLORIDE 10 MG/ML
INJECTION, SOLUTION INFILTRATION; PERINEURAL PRN
Status: DISCONTINUED | OUTPATIENT
Start: 2022-06-20 | End: 2022-06-20

## 2022-06-20 RX ORDER — ONDANSETRON 2 MG/ML
4 INJECTION INTRAMUSCULAR; INTRAVENOUS EVERY 30 MIN PRN
Status: DISCONTINUED | OUTPATIENT
Start: 2022-06-20 | End: 2022-06-20 | Stop reason: HOSPADM

## 2022-06-20 RX ADMIN — SPIRONOLACTONE 25 MG: 25 TABLET, FILM COATED ORAL at 09:00

## 2022-06-20 RX ADMIN — LACTULOSE 20 G: 10 SOLUTION ORAL at 09:00

## 2022-06-20 RX ADMIN — SODIUM CHLORIDE, POTASSIUM CHLORIDE, SODIUM LACTATE AND CALCIUM CHLORIDE: 600; 310; 30; 20 INJECTION, SOLUTION INTRAVENOUS at 12:03

## 2022-06-20 RX ADMIN — ONDANSETRON 4 MG: 4 TABLET, ORALLY DISINTEGRATING ORAL at 08:59

## 2022-06-20 RX ADMIN — RIFAXIMIN 550 MG: 550 TABLET ORAL at 09:01

## 2022-06-20 RX ADMIN — FOLIC ACID 1 MG: 1 TABLET ORAL at 09:01

## 2022-06-20 RX ADMIN — THERA TABS 1 TABLET: TAB at 09:00

## 2022-06-20 RX ADMIN — PROPOFOL 275 MCG/KG/MIN: 10 INJECTION, EMULSION INTRAVENOUS at 12:21

## 2022-06-20 RX ADMIN — PHYTONADIONE 10 MG: 10 INJECTION, EMULSION INTRAMUSCULAR; INTRAVENOUS; SUBCUTANEOUS at 09:01

## 2022-06-20 RX ADMIN — LACTULOSE 20 G: 10 SOLUTION ORAL at 13:40

## 2022-06-20 RX ADMIN — LIDOCAINE HYDROCHLORIDE 3 ML: 10 INJECTION, SOLUTION INFILTRATION; PERINEURAL at 12:19

## 2022-06-20 RX ADMIN — Medication 400 MG: at 09:01

## 2022-06-20 RX ADMIN — Medication 100 MG: at 09:00

## 2022-06-20 RX ADMIN — PANTOPRAZOLE SODIUM 40 MG: 20 TABLET, DELAYED RELEASE ORAL at 09:01

## 2022-06-20 RX ADMIN — Medication 1 MG: at 00:42

## 2022-06-20 RX ADMIN — FUROSEMIDE 40 MG: 40 TABLET ORAL at 09:01

## 2022-06-20 ASSESSMENT — ACTIVITIES OF DAILY LIVING (ADL)
ADLS_ACUITY_SCORE: 37
TOILETING_ISSUES: NO
DRESSING/BATHING_DIFFICULTY: NO
WEAR_GLASSES_OR_BLIND: NO
ADLS_ACUITY_SCORE: 32
ADLS_ACUITY_SCORE: 37
WALKING_OR_CLIMBING_STAIRS_DIFFICULTY: NO
CHANGE_IN_FUNCTIONAL_STATUS_SINCE_ONSET_OF_CURRENT_ILLNESS/INJURY: NO
DIFFICULTY_EATING/SWALLOWING: NO
ADLS_ACUITY_SCORE: 37
DOING_ERRANDS_INDEPENDENTLY_DIFFICULTY: NO
CONCENTRATING,_REMEMBERING_OR_MAKING_DECISIONS_DIFFICULTY: NO
ADLS_ACUITY_SCORE: 49
FALL_HISTORY_WITHIN_LAST_SIX_MONTHS: NO
ADLS_ACUITY_SCORE: 37

## 2022-06-20 ASSESSMENT — LIFESTYLE VARIABLES: TOBACCO_USE: 1

## 2022-06-20 NOTE — PLAN OF CARE
Physical Therapy Discharge Summary    Reason for therapy discharge:    All goals and outcomes met, no further needs identified.    Progress towards therapy goal(s). See goals on Care Plan in [facility] electronic health record for goal details.  Goals met    Therapy recommendation(s):    Continued therapy is recommended.  Rationale/Recommendations:  Home physical therapy recommended as patient's functional mobility, though adequate for return to home, remains below norms for his age group. .

## 2022-06-20 NOTE — PROGRESS NOTES
[facility] MEDICINE PROGRESS NOTE      Identification/Summary: Dillon Salter is a 26 year old male with a past medical history of chronic anemia secondary to variceal bleeding, alcohol abuse and dependence, sober since 4/2022, alcoholic liver cirrhosis, portal hypertension, DM 2, anxiety, recent hospitalization on 5/23-6/7 with hepatic encephalopathy, MSSA bacteremia, anemia, underwent EGD on May/24 required esophageal varices banding with complete eradication.     Patient admitted on 6/18 for worsening confusion, weakness. Found to have hemoglobin 7.4.  Received 1 unit of packed RBC transfusion.  Hemoglobin remained stable at 7.1.  No overt GI bleeding.  Vitamin K 5 mg daily for 3-day for coagulopathy from liver disease. EGD scheduled for 6/20 to reassess varices.     Abdominal US revealed no ascites. Resumed diuretic and low-salt diet.  He is on lactulose 3 times a day.Encephalopathy much improved. PT/OT working with patient. Lives with his mother.  Anticipated discharge in 1 day.       Assessment and Plan:  Metabolic and toxic encephalopathy  Hepatic encephalopathy, improved  Confusion cleared  No focal deficit  Continue lactulose 20 g 4 times a day  Rifaximin 550 mg twice    Acute on chronic anemia, no overt GI bleeding  Anemia likely multifactorial   Has coagulopathy and thrombocytopenia related to liver disease and history of hemolysis   Status post 1 unit of packed RBC transfusion  Hematology saw him recently as an outpatient   Hgb 7.1 on 6/20      Esophageal varices,   Status post esophageal varices banding on 5/24 with complete eradication  EGD scheduled for today 6/20 at noon   Continue Pantoprazole 40 mg BID      Coagulopathy from liver disease,   Vitamin K 5 mg daily for 3-day  No active GI bleeding at present  Evaluated by hematology and gastroenterology in last hospitalization     Alcoholic liver cirrhosis  Portal hypertension  No ascites on ultrasound 6/18,   Low-salt  diet  Lasix 40 mg twice a day  Spironolactone 25 mg TID  NPO - EGD scheduled for today 6/20 at noon to reassess varices     Coagulopathy from liver disease, vitamin K 5 mg daily for 3-day  Hyponatremia, 133 on 6/20  Bilirubinemia ,13.6 on 6/20  Splenomegaly  History of heavy alcohol use for 9 years  Sober since 4/2022  Encouraged to maintain sobriety    Generalized weakness and deconditioning  Improving . Continue supportive care  Per OT - home PT is recommended. Pts functional mobility adequate for return to home      DM2  Hypoglycemia on admission  Diabetic diet and insulin sliding scale  Lantus 25 unit daily at bedtime      Severe protein calorie malnutrition  Increase p.o. protein intake  Multivitamin PO daily  Thiamine 100 mg daily  Folic acid 1 mg daily     Depression  Fluoxetine 40 mg daily     Code full  DVT prophylaxis  Early ambulation and SCDs  Low risk as coagulopathy and thrombocytopenia     Barrier to discharge  Awaiting for more clinical improvement.  Anticipated discharge in 1 day        Diet: NPO per Anesthesia Guidelines for Procedure/Surgery Except for: Meds  Snacks/Supplements Adult: Ensure Enlive; Between Meals  DVT Prophylaxis:  Early ambulation and SCDs, Low risk as coagulopathy and thrombocytopenia  Code Status: Full Code    Anticipated possible discharge in 1 day. Awaiting for clinical improvement.   Disposition Plan   Expected Discharge: 06/21/2022     Anticipated discharge location:  Awaiting care coordination huddle  Delays:    I reviewed and agreed with Seema Martinez's plan and note. I examined the patient by myself.    Elizabeth Guaman MD  [facility]     The patient's care was discussed with the Attending Physician, Dr. Guaman, Patient and Patient's Family.    KELSEA Fleming-S      [facility]  Phone: #[phone]    Interval History/Subjective:  Patient is pleasant and cooperative with the assessment. He is accompanied by his mother. Patient  feels fine today but states that he is concerned that about low blood glucose since he is NPO for EGD this afternoon. Patient was seen up walking in his room with PT. Weakness is improving. Denies chest pain, shortness of breath, abdominal pain, dizziness, nausea or vomiting.     Physical Exam/Objective:  Temp:  [97.4  F (36.3  C)-97.9  F (36.6  C)] 97.4  F (36.3  C)  Pulse:  [94-99] 94  Resp:  [16-18] 16  BP: (123-142)/(59-73) 123/60  SpO2:  [96 %-99 %] 97 %  Body mass index is 20.8 kg/m .    GENERAL:  Alert, appears comfortable, in no acute distress, appears stated age   HEAD:  Normocephalic, atraumatic   EYES:  Scleral icterus, EOM's intact   NOSE: Nares normal, no drainage   THROAT: Dry oral mucosa noted, dried blood on bilateral lipS   NECK: Supple    LUNGS:   Clear to auscultation bilaterally, no rales, rhonchi, or wheezing, symmetric chest rise on inhalation, respirations unlabored   CHEST WALL:  No tenderness or deformity   HEART:  Regular rate and rhythm, S1 and S2 normal, no murmur    ABDOMEN:   Soft, non-tender, mildly distended, no ascites on US, bowel sounds present    EXTREMITIES: no cyanosis or edema    SKIN: Jaundice    NEURO: Alert, oriented x3, moves all four extremities freely   PSYCH: Cooperative, behavior is appropriate      Data reviewed today: I personally reviewed all new medications, labs, imaging/diagnostics reports over the past 24 hours. Pertinent findings include:    Imaging:   Abdomen US--No ascites identified.    CXR--Patchy right infrahilar opacities favored to represent atelectasis although developing infection is in the differential. Left lung is clear. No effusions. Heart size is normal.    Labs:  Most Recent 3 CBC's:Recent Labs   Lab Test 06/20/22  0457 06/19/22  0644 06/18/22  1035   WBC 10.2 9.9 11.3*   HGB 7.1* 7.2* 7.4*   MCV 98 94 96   * 102* 114*     Most Recent 3 BMP's:Recent Labs   Lab Test 06/20/22  0749 06/20/22  0457 06/20/22  0150 06/19/22  0740 06/19/22  0644  06/18/22  1110 06/18/22  1035   NA  --  133*  --   --  132*  --  133*   POTASSIUM  --  4.2  --   --  4.9  --  4.1   CHLORIDE  --  96*  --   --  98  --  93*   CO2  --  28  --   --  27  --  32*   BUN  --  21  --   --  22  --  30*   CR  --  0.62*  --   --  0.61*  --  0.72   ANIONGAP  --  9  --   --  7  --  8   SARTHAK  --  8.8  --   --  9.1  --  9.6   * 180* 309*   < > 189*   < > 64*    < > = values in this interval not displayed.     Most Recent 2 LFT's:Recent Labs   Lab Test 06/20/22 0457 06/19/22  0644   * 101*   ALT 42 40   ALKPHOS 212* 172*   BILITOTAL 13.6* 13.5*     Most Recent 3 INR's:Recent Labs   Lab Test 06/20/22 0457 06/19/22  0644 06/18/22  1038   INR 1.51* 1.66* 1.71*       Medications:   Personally Reviewed.  Medications       FLUoxetine  40 mg Oral At Bedtime     folic acid  1 mg Oral Daily     furosemide  40 mg Oral BID     insulin aspart  1-7 Units Subcutaneous TID AC     insulin glargine  25 Units Subcutaneous At Bedtime     lactulose  20 g Oral TID     magnesium oxide  400 mg Oral BID     multivitamin, therapeutic  1 tablet Oral Daily     pantoprazole  40 mg Oral BID     phytonadione  10 mg Oral Daily     rifaximin  550 mg Oral BID     sodium chloride (PF)  3 mL Intracatheter Q8H     spironolactone  25 mg Oral TID     thiamine  100 mg Oral Daily

## 2022-06-20 NOTE — PLAN OF CARE
Problem: Plan of Care - These are the overarching goals to be used throughout the patient stay.    Goal: Plan of Care Review/Shift Note  Description: The Plan of Care Review/Shift note should be completed every shift.  The Outcome Evaluation is a brief statement about your assessment that the patient is improving, declining, or no change.  This information will be displayed automatically on your shift note.  Outcome: Ongoing, Progressing   Goal Outcome Evaluation:    A/Ox4. Denies pain, nausea. Refused lactulose and spironolactone this shift. Had 3 BMs. Up with SBA and gait belt. NPO at midnight for possible EGD tomorrow.

## 2022-06-20 NOTE — PROGRESS NOTES
"Care Management Discharge Note    Discharge Date: 06/20/2022       Discharge Disposition: Home    Discharge Services: home with parents and patient states he will be going to AA meetings    Discharge Transportation: family or friend will provide    Education Provided on the Discharge Plan:  CM met with patient. AVS per bedside RN.    Persons Notified of Discharge Plans: patient and Mom    Patient/Family in Agreement with the Plan: yes    Handoff Referral Completed: Yes    Additional Information:  Chart reviewed. Patient assessed 06/20/22. See CM assessment note. Patient states he is discharging home today. His Mom will provide transportation home. Patient denied needs from CM. Patient states he does not need CD resources. Patient states he \"feels strong\" and will be attending AA meetings.         Eileen Kim RN        "

## 2022-06-20 NOTE — PROGRESS NOTES
Occupational Therapy Discharge Summary    Reason for therapy discharge:    Discharged to home.    Progress towards therapy goal(s). See goals on Care Plan in [facility] electronic health record for goal details.  Goals partially met.  Barriers to achieving goals:   discharge from facility.    Therapy recommendation(s):    No further therapy is recommended.  Pt was given home exercise program to strengthen his arms using a resistive band.  Pt's mother present and show exercises program.  Rep band provided to Pt.

## 2022-06-20 NOTE — DISCHARGE SUMMARY
[facility] MEDICINE  DISCHARGE SUMMARY     Primary Care Physician: Gary Rodrigues  Admission Date: 6/18/2022   Discharge Provider: Elizabeth Guaman MD Discharge Date: 6/20/2022   Diet:     Snacks/Supplements Adult: Ensure Enlive; Between Meals     2 Gram Sodium Diet      Code Status: Full Code   Activity: DCACTIVITY: Activity as tolerated        Condition at Discharge: Stable     REASON FOR PRESENTATION(See Admission Note for Details)   Confusion, generalized weakness, decreased appetite for 3 days    PRINCIPAL & ACTIVE DISCHARGE DIAGNOSES   Metabolic and toxic encephalopathy  Hepatic encephalopathy, resolved  Acute on chronic anemia, no GI bleeding  History of esophageal varices status post banding on 5/24  Coagulopathy from liver disease  Alcoholic liver cirrhosis  Portal hypertension  No ascites on ultrasound on 6/18 slight hyponatremia  Bilirubinemia  History of heavy alcohol use for 9 years, sober since 4/2022  DM2  Episode of hypoglycemia on admission  Severe protein calorie malnutrition  Depression  Generalized weakness and deconditioning    PENDING LABS     Unresulted Labs Ordered in the Past 30 Days of this Admission     Date and Time Order Name Status Description    6/18/2022  4:11 PM Blood Culture Peripheral Blood Preliminary     6/18/2022  4:11 PM Blood Culture Peripheral Blood Preliminary     5/26/2022  3:30 PM Prepare pheresed platelets (unit) Preliminary           PROCEDURES ( this hospitalization only)      Procedure(s):  ESOPHAGOGASTRODUODENOSCOPY  Portal hypertensive gastropathy. Grade I varices with healing banding ulcers.   RECOMMENDATIONS TO OUTPATIENT PROVIDER FOR F/U VISIT     Follow-up Appointments     Follow-up and recommended labs and tests       Follow-up with primary MD in 3 days, already scheduled  CBC, CMP, INR in 1 week  Follow-up with hepatology as scheduled             DISPOSITION     Home    SUMMARY OF HOSPITAL COURSE:      Mr. Dillon HAMMOND  Joie is a 26 year old male with a past medical history of chronic anemia secondary to variceal bleeding, alcohol abuse and dependence, sober since 4/2022, alcoholic liver cirrhosis, portal hypertension, DM 2,  anxiety, recent hospitalization on 5/23-6/7 with hepatic encephalopathy, MSSA bacteremia, anemia, underwent EGD on May/24 required esophageal varices banding with complete eradication. Patient admitted on 6/18 for worsening confusion, weakness. Found to have hemoglobin 7.4.  Received 1 unit of packed RBC transfusion.  Hemoglobin remained stable at 7.1.  No overt GI bleeding. Received Vitamin K 5 mg daily for 3-day for coagulopathy from liver disease. Follow up EGD healing duodenal ulcer, no active bleeding. Abdominal US revealed no ascites. Resumed diuretic and low-salt diet.  He is on lactulose 4 times a day. Started on rifampin 550 mg twice a day. Encephalopathy cleared.  Discharging home in stable condition.  He is very positive to maintain sobriety from alcohol use.  Lives with family.  Will follow-up with hepatology as outpatient.  CBC will be rechecked in a week.    Discharge Medications with Med changes:     Current Discharge Medication List      START taking these medications    Details   rifaximin (XIFAXAN) 550 MG TABS tablet Take 1 tablet (550 mg) by mouth 2 times daily  Qty: 60 tablet, Refills: 0    Associated Diagnoses: Alcoholic cirrhosis of liver with ascites (H)         CONTINUE these medications which have NOT CHANGED    Details   alcohol swab prep pads Use to swab area of injection/marsha as directed.  Qty: 100 each, Refills: 3    Associated Diagnoses: Diabetes mellitus type 2 in obese (H)      blood glucose (NO BRAND SPECIFIED) test strip Use to test blood sugar 4 times daily or as directed. To accompany: Blood Glucose Monitor Brands: per insurance.  Qty: 100 strip, Refills: 6    Associated Diagnoses: Diabetes mellitus type 2 in obese (H)      blood glucose monitoring (NO BRAND SPECIFIED)  meter device kit Use to test blood sugar 4 times daily or as directed. Preferred blood glucose meter OR supplies to accompany: Blood Glucose Monitor Brands: per insurance.  Qty: 1 kit, Refills: 0    Associated Diagnoses: Diabetes mellitus type 2 in obese (H)      FLUoxetine (PROZAC) 40 MG capsule Take 40 mg by mouth At Bedtime      folic acid (FOLVITE) 1 MG tablet Take 1 tablet (1 mg) by mouth in the morning.  Qty: 30 tablet, Refills: 0    Associated Diagnoses: Alcoholic cirrhosis of liver with ascites (H)      furosemide (LASIX) 40 MG tablet Take 1 tablet (40 mg) by mouth 2 times daily  Qty: 30 tablet, Refills: 3    Associated Diagnoses: Alcoholic cirrhosis of liver with ascites (H)      insulin glargine (LANTUS SOLOSTAR) 100 UNIT/ML pen Inject 25 Units Subcutaneous At Bedtime  Qty: 15 mL, Refills: 3    Comments: If Lantus is not covered by insurance, may substitute Basaglar or Semglee or other insulin glargine product per insurance preference at same dose and frequency.    Associated Diagnoses: Diabetes mellitus type 2 in obese (H)      insulin pen needle (ULTICARE MICRO) 32G X 4 MM miscellaneous Use pen needles daily or as directed.  Qty: 100 each, Refills: 3    Associated Diagnoses: Diabetes mellitus type 2 in obese (H)      lactulose (CHRONULAC) 10 GM/15ML solution Take 30 mLs (20 g) by mouth 4 times daily  Qty: 1892 mL, Refills: 1    Associated Diagnoses: Alcoholic cirrhosis of liver with ascites (H)      magnesium oxide (MAG-OX) 400 MG tablet Take 1 tablet (400 mg) by mouth in the morning and 1 tablet (400 mg) in the evening.  Qty: 60 tablet, Refills: 0    Associated Diagnoses: Alcoholic cirrhosis of liver with ascites (H)      multivitamin, therapeutic (THERA-VIT) TABS tablet Take 1 tablet by mouth in the morning.    Associated Diagnoses: Alcoholic cirrhosis of liver with ascites (H)      pantoprazole (PROTONIX) 40 MG EC tablet Take 1 tablet (40 mg) by mouth 2 times daily  Qty: 30 tablet, Refills: 0     Associated Diagnoses: Alcohol intoxication with delirium (H)      spironolactone (ALDACTONE) 25 MG tablet Take 1 tablet (25 mg) by mouth 3 times daily  Qty: 180 tablet, Refills: 1    Associated Diagnoses: Alcoholic cirrhosis of liver with ascites (H)      thiamine (B-1) 100 MG tablet Take 1 tablet (100 mg) by mouth in the morning.  Qty: 30 tablet, Refills: 0    Associated Diagnoses: Alcoholic cirrhosis of liver with ascites (H)      thin (NO BRAND SPECIFIED) lancets Use with lanceting device. To accompany: Blood Glucose Monitor Brands: per insurance.  Qty: 100 each, Refills: 6    Associated Diagnoses: Diabetes mellitus type 2 in obese (H)      insulin aspart (NOVOLOG FLEXPEN) 100 UNIT/ML pen 5 units tid with meals and 1 unit per 15 unit of carbohydrate counting  Qty: 15 mL, Refills: 3    Associated Diagnoses: Diabetes mellitus type 2 in obese (H)         STOP taking these medications       gabapentin (NEURONTIN) 100 MG capsule Comments:   Reason for Stopping:         hydrOXYzine (ATARAX) 25 MG tablet Comments:   Reason for Stopping:                     Rationale for medication changes:      Gabapentin and hydroxyzine are discontinued          Consults   Gastroenterology    Immunizations given this encounter     Most Recent Immunizations   Administered Date(s) Administered     COVID-19,PF,Pfizer (12+ Yrs) 01/10/2022     DTAP (<7y) 08/24/1999     DTP-Hib 03/03/1995     HPV Quadrivalent 12/03/2014     Hep B, Peds or Adolescent 06/29/2007     Influenza (H1N1) 01/19/2010     Influenza (IIV3) PF 10/22/2012     Influenza Vaccine IM > 6 months Valent IIV4 (Alfuria,Fluzone) 01/10/2022     MMR 08/24/1999     Meningococcal (Menactra ) 01/19/2010     OPV, trivalent, live 03/03/1995     Pneumococcal 23 valent 10/22/2012     Tdap (Adacel,Boostrix) 01/03/2017     Varicella 06/29/2007           Anticoagulation Information    None      SIGNIFICANT IMAGING FINDINGS     Results for orders placed or performed during the hospital  encounter of 06/18/22   XR Chest Port 1 View    Impression    IMPRESSION: Patchy right infrahilar opacities favored to represent atelectasis although developing infection is in the differential. Left lung is clear. No effusions. Heart size is normal.   US Abdomen Limited    Impression    IMPRESSION:  1.  No ascites identified.           SIGNIFICANT LABORATORY FINDINGS     INR 1.51 slightly , ALT 42, albumin 2.8, bilirubin 13.6  Sodium 133, creatinine 0.62, potassium 4.2    Discharge Orders        Reason for your hospital stay    CONFUSION     Follow-up and recommended labs and tests     Follow-up with primary MD in 3 days, already scheduled  CBC, CMP, INR in 1 week  Follow-up with hepatology as scheduled     Activity    Your activity upon discharge: activity as tolerated     Diet    Follow this diet upon discharge: LOW SALT DIET       Examination   Physical Exam   Temp:  [97.4  F (36.3  C)-98.1  F (36.7  C)] 98.1  F (36.7  C)  Pulse:  [88-99] 96  Resp:  [16-18] 16  BP: (118-142)/(55-83) 135/83  SpO2:  [96 %-99 %] 98 %  Wt Readings from Last 1 Encounters:   06/18/22 56.7 kg (125 lb)     GENERAL:  Alert, appears comfortable, in no acute distress, appears stated age   HEAD:  Normocephalic, atraumatic   EYES:  Scleral icterus, EOM's intact   NOSE: Nares normal, no drainage   THROAT: Dried blood on bilateral lipS   NECK: Supple    LUNGS:   Clear to auscultation bilaterally, no rales, rhonchi, or wheezing, symmetric chest rise on inhalation, respirations unlabored   CHEST WALL:  No tenderness or deformity   HEART:  Regular rate and rhythm, S1 and S2 normal, no murmur    ABDOMEN:   Soft, non-tender, mildly distended, no ascites on US, bowel sounds present    EXTREMITIES: no cyanosis or edema    SKIN: Jaundice    NEURO: Alert, oriented x3, moves all four extremities freely   PSYCH: Cooperative, behavior is appropriate           Please see EMR for more detailed significant labs, imaging, consultant notes etc.    I,  Elizabeth Guaman MD, personally saw the patient today and spent greater than 30 minutes discharging this patient.    Elizabeth Guaman MD  [facility]    CC:Gary Rodrigues

## 2022-06-20 NOTE — PLAN OF CARE
Problem: Plan of Care - These are the overarching goals to be used throughout the patient stay.    Goal: Plan of Care Review/Shift Note  Description: The Plan of Care Review/Shift note should be completed every shift.  The Outcome Evaluation is a brief statement about your assessment that the patient is improving, declining, or no change.  This information will be displayed automatically on your shift note.  6/20/2022 0333 by Kirit Cantor RN  Outcome: Ongoing, Progressing  Flowsheets (Taken 6/20/2022 0329)  Plan of Care Reviewed With: patient  Outcome Evaluation:  Pt alert and oriented. Denies abdominal pain. C/o generalized stiffness.   2 am blood sugar 309; MD aware and is ok with that level considering his liver condition. Pt has been NPO from solids from midnight. 2am had a clear liquid jello per request, allowed per NPO order. EDG scheduled for 6/21 but GI may be able get him in today. Discharge pending.   Overall Patient Progress: improving

## 2022-06-20 NOTE — ANESTHESIA POSTPROCEDURE EVALUATION
Patient: Dillon Salter    Procedure: Procedure(s):  ESOPHAGOGASTRODUODENOSCOPY       Anesthesia Type:  MAC    Note:  Disposition: Inpatient   Postop Pain Control: Uneventful            Sign Out: Well controlled pain   PONV: No   Neuro/Psych: Uneventful            Sign Out: Acceptable/Baseline neuro status   Airway/Respiratory: Uneventful            Sign Out: Acceptable/Baseline resp. status   CV/Hemodynamics: Uneventful            Sign Out: Acceptable CV status; No obvious hypovolemia; No obvious fluid overload   Other NRE: NONE   DID A NON-ROUTINE EVENT OCCUR? No           Last vitals:  Vitals:    06/20/22 1330 06/20/22 1400 06/20/22 1500   BP: 134/83 135/83 134/65   Pulse: 94 96 88   Resp: 16 16 16   Temp: 36.4  C (97.6  F) 36.7  C (98.1  F) 36.7  C (98.1  F)   SpO2: 99% 98% 100%       Electronically Signed By: Deedee Mojica MD  June 20, 2022  4:22 PM

## 2022-06-20 NOTE — ANESTHESIA PREPROCEDURE EVALUATION
Anesthesia Pre-Procedure Evaluation    Patient: Dillon Salter   MRN: [id number] : 1995        Procedure : Procedure(s):  ESOPHAGOGASTRODUODENOSCOPY (EGD)          Past Medical History:   Diagnosis Date     Diabetes (H)       Past Surgical History:   Procedure Laterality Date     ESOPHAGOSCOPY, GASTROSCOPY, DUODENOSCOPY (EGD), COMBINED N/A 2022    Procedure: ESOPHAGOGASTRODUODENOSCOPY (EGD) with esophageal banding;  Surgeon: Gary Hurst MD;  Location: [facility] Main OR     MIDLINE DOUBLE LUMEN PLACEMENT  2022           No Known Allergies   Social History     Tobacco Use     Smoking status: Former Smoker     Smokeless tobacco: Former User   Substance Use Topics     Alcohol use: Not Currently      Wt Readings from Last 1 Encounters:   22 56.7 kg (125 lb)        Anesthesia Evaluation   Pt has had prior anesthetic.     No history of anesthetic complications       ROS/MED HX  ENT/Pulmonary:  - neg pulmonary ROS   (+) tobacco use,     Neurologic: Comment: Encephalopathy, mild      Cardiovascular:     (+) hypertension-----    METS/Exercise Tolerance:     Hematologic:     (+) anemia (Got 2 units PRBC, geting 1 unit Plt),     Musculoskeletal:       GI/Hepatic:     (+) hepatitis type Alcoholic, liver disease (alcoholic cirrhosis, with jaundice & thrombocytopenia, presenting with coffee-ground emesis. No apparent ascites on exam.),     Renal/Genitourinary:  - neg Renal ROS     Endo:     (+) type II DM,     Psychiatric/Substance Use:     (+) psychiatric history anxiety and depression     Infectious Disease:       Malignancy:       Other:            Physical Exam    Airway        Mallampati: II   TM distance: > 3 FB   Neck ROM: full     Respiratory Devices and Support         Dental     Comment: Fair dentition        Cardiovascular          Rhythm and rate: regular and normal     Pulmonary           breath sounds clear to auscultation           OUTSIDE LABS:  CBC:   Lab Results   Component Value  Date    WBC 10.2 06/20/2022    WBC 9.9 06/19/2022    HGB 7.1 (L) 06/20/2022    HGB 7.2 (L) 06/19/2022    HCT 21.0 (L) 06/20/2022    HCT 20.8 (L) 06/19/2022     (L) 06/20/2022     (L) 06/19/2022     BMP:   Lab Results   Component Value Date     (L) 06/20/2022     (L) 06/19/2022    POTASSIUM 4.2 06/20/2022    POTASSIUM 4.9 06/19/2022    CHLORIDE 96 (L) 06/20/2022    CHLORIDE 98 06/19/2022    CO2 28 06/20/2022    CO2 27 06/19/2022    BUN 21 06/20/2022    BUN 22 06/19/2022    CR 0.62 (L) 06/20/2022    CR 0.61 (L) 06/19/2022     (H) 06/20/2022     (H) 06/20/2022     COAGS:   Lab Results   Component Value Date    PTT 46 (H) 06/18/2022    INR 1.51 (H) 06/20/2022    FIBR 223 05/24/2022     POC: No results found for: BGM, HCG, HCGS  HEPATIC:   Lab Results   Component Value Date    ALBUMIN 2.8 (L) 06/20/2022    PROTTOTAL 7.3 06/20/2022    ALT 42 06/20/2022     (H) 06/20/2022    ALKPHOS 212 (H) 06/20/2022    BILITOTAL 13.6 (H) 06/20/2022    DESI 58 (H) 06/18/2022     OTHER:   Lab Results   Component Value Date    LACT 1.1 06/18/2022    A1C 7.4 (H) 04/07/2022    SARTHAK 8.8 06/20/2022    PHOS 3.6 06/19/2022    MAG 1.8 06/19/2022    LIPASE 81 (H) 06/18/2022       Anesthesia Plan    ASA Status:  4   NPO Status:  NPO Appropriate    Anesthesia Type: MAC.              Consents    Anesthesia Plan(s) and associated risks, benefits, and realistic alternatives discussed. Questions answered and patient/representative(s) expressed understanding.     - Discussed: Risks, Benefits and Alternatives for the PROCEDURE were discussed     - Discussed with:  Patient         Postoperative Care    Pain management: Oral pain medications.   PONV prophylaxis: Ondansetron (or other 5HT-3)     Comments:                    Arie Quintanilla MD

## 2022-06-20 NOTE — PLAN OF CARE
Problem: Bleeding (Gastrointestinal Bleeding)  Goal: Hemostasis  Outcome: Ongoing, Progressing   EGD completed today. No major bleeding identified. Patient complains of weakness. VSS on room air. Patient reports having 5 total BMs so far today.     Patient planning to discharge home today with family support.

## 2022-06-20 NOTE — ANESTHESIA CARE TRANSFER NOTE
Patient: Dillon Salter    Procedure: Procedure(s):  ESOPHAGOGASTRODUODENOSCOPY       Diagnosis: Secondary esophageal varices with bleeding (H) [I85.11]  Anemia [D64.9]  Diagnosis Additional Information: No value filed.    Anesthesia Type:   MAC     Note:    Oropharynx: oropharynx clear of all foreign objects and spontaneously breathing  Level of Consciousness: drowsy  Oxygen Supplementation: room air    Independent Airway: airway patency satisfactory and stable  Dentition: dentition unchanged  Vital Signs Stable: post-procedure vital signs reviewed and stable  Report to RN Given: handoff report given  Patient transferred to: Medical/Surgical Unit          Vitals:  Vitals Value Taken Time   /65 06/20/22 1247   Temp 36.5  C (97.7  F) 06/20/22 1247   Pulse 88 06/20/22 1247   Resp 18 06/20/22 1247   SpO2 97 % 06/20/22 1247       Electronically Signed By: VAMSHI LIZ CRNA  June 20, 2022  12:49 PM

## 2022-06-20 NOTE — H&P
The History and Physical has been reviewed, the patient has been examined and no changes have occurred in the patient's condition since the H & P was completed.       Gavino Reza MD  [facility]  [phone]

## 2022-06-20 NOTE — PROGRESS NOTES
"Care Management Initial Consult    General Information  Assessment completed with: Patient    Type of CM/SW Visit: Initial Assessment        Advance Care Planning:  Does not have but states he would want his Mom to make his decisions       Communication Assessment  Patient's communication style: spoken language (English or Bilingual)    Hearing Difficulty or Deaf: no   Wear Glasses or Blind: no    Cognitive  Cognitive/Neuro/Behavioral: WDL  Level of Consciousness: alert  Arousal Level: opens eyes spontaneously  Orientation: oriented x 4  Mood/Behavior: flat affect, cooperative     Speech: clear    Living Environment:   People in home: parent(s)     Current living Arrangements: house      Able to return to prior arrangements: yes       Family/Social Support:  Care provided by: self  Provides care for: no one  Marital Status: Single  Parent(s)          Description of Support System: Supportive, Involved         Current Resources:   Patient receiving home care services:       Community Resources:    Equipment currently used at home: none  Supplies currently used at home:      Employment/Financial:  Employment Status: unemployed        Financial Concerns: No concerns identified     Functional Status:  Prior to admission patient needed assistance: independent but does not drive     Mental Health Status:  Mental Health Status: No Current Concerns       Chemical Dependency Status:  Chemical Dependency Status: states he has resources, \"I feel strong\" (will be going to outpatient AA meetings)                        Additional Information:  Chart reviewed. CM met with patient and assessed. Patient lives with his parents. He reports being independent at baseline. Patient denied mental health concerns. He states he has plenty of chemical dependency resources and plans to attend AA meetings. Denied needs from CM. Mom will transport home at time of hospital discharge.     Eileen Kim RN        "

## 2022-06-21 ENCOUNTER — PATIENT OUTREACH (OUTPATIENT)
Dept: CARE COORDINATION | Facility: CLINIC | Age: 27
End: 2022-06-21

## 2022-06-21 ENCOUNTER — CARE COORDINATION (OUTPATIENT)
Dept: TRANSPLANT | Facility: CLINIC | Age: 27
End: 2022-06-21
Payer: COMMERCIAL

## 2022-06-21 DIAGNOSIS — Z71.89 OTHER SPECIFIED COUNSELING: ICD-10-CM

## 2022-06-21 NOTE — PROGRESS NOTES
"Mother (Leticia) reports Dillon in [facility] ER (again) over last weekend due to crash in his blood sugar. See EMR for details.   reports she cancelled pending ROBERT Assessment (with Vicky Lomeli, 6/17) as a result and would be resetting that appointment with [facility]. I will be watching chart for that appointment and ultimate recommendation from ROBERT  regarding professional ROBERT services.       Addendum (6/22)    Per discharge from [facility] note: Client refused referral for ROBERT services, intending to \"go to AA and stay sober\". While this is an excellent plan - I will be contacting Leticia to assure her that a current ROBERT assessment will be required in [place] chart before he proceeds further in his Liver Transplant Screening process as part of [facility] policy regarding use of alcohol within past six months. See EMR for positive PETH test from April, 2022. It will also be required that Dillon follow all recommendations from that Assessment. I have faxed signed [place] (see scanned documents) to [facility] in [place] for ROBERT documentation for patient.     Addendum (6/23)     Keily ([phone]) from [facility] called and said they need an GE[place]IA from [facility] to [facility] for release of records before they will send them to us. I will leave it to the ROBERT  to arrange for that to happen (as part of collateral information) should Dillon be scheduled again for an ROBERT assessment through [facility] in the near future. At this point in time it appears as if patient is scheduled for 3 month \"return liver\" appointment with [facility] in August, 2022.      MARC Park    "

## 2022-06-21 NOTE — PLAN OF CARE
Pt ready for discharge. All belongings sent with patient. Discharge instructions reviewed with pt and all questions answered. IV removed. Parent to transport.

## 2022-06-21 NOTE — PROGRESS NOTES
Clinic Care Coordination Contact  [facility]/Voicemail       Clinical Data: Care Coordinator Outreach  Outreach attempted x 1.  Left message on patient's voicemail with call back information and requested return call.  Care Coordinator will try to reach patient again in 1-2 business days.      Kimi Suero  [phone]  Care

## 2022-06-23 ENCOUNTER — INFUSION THERAPY VISIT (OUTPATIENT)
Dept: INFUSION THERAPY | Facility: CLINIC | Age: 27
End: 2022-06-23
Attending: INTERNAL MEDICINE
Payer: COMMERCIAL

## 2022-06-23 VITALS
SYSTOLIC BLOOD PRESSURE: 138 MMHG | HEART RATE: 100 BPM | DIASTOLIC BLOOD PRESSURE: 75 MMHG | RESPIRATION RATE: 18 BRPM | OXYGEN SATURATION: 98 % | TEMPERATURE: 98.5 F

## 2022-06-23 DIAGNOSIS — D62 ACUTE POSTHEMORRHAGIC ANEMIA: Primary | ICD-10-CM

## 2022-06-23 LAB
ABO/RH(D): NORMAL
ACANTHOCYTES BLD QL SMEAR: SLIGHT
ANTIBODY SCREEN: NEGATIVE
BACTERIA BLD CULT: NO GROWTH
BACTERIA BLD CULT: NO GROWTH
BASO STIPL BLD QL SMEAR: PRESENT
BASOPHILS # BLD MANUAL: 0.6 10E3/UL (ref 0–0.2)
BASOPHILS NFR BLD MANUAL: 5 %
BLD PROD TYP BPU: NORMAL
BLOOD COMPONENT TYPE: NORMAL
BURR CELLS BLD QL SMEAR: SLIGHT
CODING SYSTEM: NORMAL
CROSSMATCH: NORMAL
EOSINOPHIL # BLD MANUAL: 0.1 10E3/UL (ref 0–0.7)
EOSINOPHIL NFR BLD MANUAL: 1 %
ERYTHROCYTE [DISTWIDTH] IN BLOOD BY AUTOMATED COUNT: 21.6 % (ref 10–15)
HCT VFR BLD AUTO: 19.5 % (ref 40–53)
HGB BLD-MCNC: 6.5 G/DL (ref 13.3–17.7)
ISSUE DATE AND TIME: NORMAL
LYMPHOCYTES # BLD MANUAL: 2 10E3/UL (ref 0.8–5.3)
LYMPHOCYTES NFR BLD MANUAL: 18 %
MCH RBC QN AUTO: 34.2 PG (ref 26.5–33)
MCHC RBC AUTO-ENTMCNC: 33.3 G/DL (ref 31.5–36.5)
MCV RBC AUTO: 103 FL (ref 78–100)
METAMYELOCYTES # BLD MANUAL: 0.2 10E3/UL
METAMYELOCYTES NFR BLD MANUAL: 2 %
MONOCYTES # BLD MANUAL: 0.5 10E3/UL (ref 0–1.3)
MONOCYTES NFR BLD MANUAL: 4 %
NEUTROPHILS # BLD MANUAL: 7.9 10E3/UL (ref 1.6–8.3)
NEUTROPHILS NFR BLD MANUAL: 70 %
PLAT MORPH BLD: ABNORMAL
PLATELET # BLD AUTO: 125 10E3/UL (ref 150–450)
POLYCHROMASIA BLD QL SMEAR: SLIGHT
RBC # BLD AUTO: 1.9 10E6/UL (ref 4.4–5.9)
RBC MORPH BLD: ABNORMAL
SPECIMEN EXPIRATION DATE: NORMAL
UNIT ABO/RH: NORMAL
UNIT NUMBER: NORMAL
UNIT STATUS: NORMAL
UNIT TYPE ISBT: 600
WBC # BLD AUTO: 11.3 10E3/UL (ref 4–11)

## 2022-06-23 PROCEDURE — 86901 BLOOD TYPING SEROLOGIC RH(D): CPT | Performed by: INTERNAL MEDICINE

## 2022-06-23 PROCEDURE — 86923 COMPATIBILITY TEST ELECTRIC: CPT | Performed by: INTERNAL MEDICINE

## 2022-06-23 PROCEDURE — 86850 RBC ANTIBODY SCREEN: CPT | Performed by: INTERNAL MEDICINE

## 2022-06-23 PROCEDURE — 85007 BL SMEAR W/DIFF WBC COUNT: CPT | Performed by: INTERNAL MEDICINE

## 2022-06-23 PROCEDURE — 36415 COLL VENOUS BLD VENIPUNCTURE: CPT | Performed by: INTERNAL MEDICINE

## 2022-06-23 PROCEDURE — 85027 COMPLETE CBC AUTOMATED: CPT | Performed by: INTERNAL MEDICINE

## 2022-06-23 PROCEDURE — P9016 RBC LEUKOCYTES REDUCED: HCPCS | Performed by: INTERNAL MEDICINE

## 2022-06-23 PROCEDURE — 36430 TRANSFUSION BLD/BLD COMPNT: CPT

## 2022-06-23 RX ORDER — HEPARIN SODIUM (PORCINE) LOCK FLUSH IV SOLN 100 UNIT/ML 100 UNIT/ML
5 SOLUTION INTRAVENOUS
Status: DISCONTINUED | OUTPATIENT
Start: 2022-06-23 | End: 2022-06-23 | Stop reason: HOSPADM

## 2022-06-23 RX ORDER — HEPARIN SODIUM,PORCINE 10 UNIT/ML
5 VIAL (ML) INTRAVENOUS
Status: CANCELLED | OUTPATIENT
Start: 2022-06-23

## 2022-06-23 RX ORDER — HEPARIN SODIUM (PORCINE) LOCK FLUSH IV SOLN 100 UNIT/ML 100 UNIT/ML
5 SOLUTION INTRAVENOUS
Status: CANCELLED | OUTPATIENT
Start: 2022-06-23

## 2022-06-23 RX ORDER — HEPARIN SODIUM,PORCINE 10 UNIT/ML
5 VIAL (ML) INTRAVENOUS
Status: DISCONTINUED | OUTPATIENT
Start: 2022-06-23 | End: 2022-06-23 | Stop reason: HOSPADM

## 2022-06-23 NOTE — PROGRESS NOTES
Infusion Nursing Note:  Dillon Salter presents today for labs and possible blood.    Patient seen by provider today: No   present during visit today: Not Applicable.    Note: N/A.    Intravenous Access:  Peripheral IV placed in right AC.    Treatment Conditions:  Lab Results   Component Value Date    HGB 6.5 (LL) 06/23/2022    WBC 11.3 (H) 06/23/2022    ANEU 7.9 06/23/2022    ANEUTAUTO 7.3 06/14/2022     (L) 06/23/2022      Results reviewed, labs MET treatment parameters, ok to proceed with treatment.  Per blood product therapy plan, patient is to receive 1 unit PRBCs.  Blood transfusion consent signed 5/26/22.    Post Infusion Assessment:  Patient tolerated blood transfusion without incident.  Blood return noted pre and post infusion.  Site patent and intact, free from redness, edema or discomfort.  Access discontinued per protocol.     Discharge Plan:   Patient will return June 27th for next appointment.   Patient discharged in stable condition accompanied by: Leticia Parr.      Zoe Garcia RN

## 2022-06-23 NOTE — PROGRESS NOTES
Infusion Nursing Note:  Dillon Salter presents today for labs and possible blood.    Patient seen by provider today: No   present during visit today: Not Applicable.    Note: N/A.    Intravenous Access:  Lab draw site Right AC, Needle type BD vacutainer collection set, Gauge 23.        Zoe Garcia RN

## 2022-06-27 ENCOUNTER — LAB (OUTPATIENT)
Dept: INFUSION THERAPY | Facility: CLINIC | Age: 27
End: 2022-06-27
Attending: INTERNAL MEDICINE
Payer: COMMERCIAL

## 2022-06-27 VITALS
TEMPERATURE: 98.6 F | OXYGEN SATURATION: 95 % | HEART RATE: 108 BPM | DIASTOLIC BLOOD PRESSURE: 87 MMHG | RESPIRATION RATE: 18 BRPM | SYSTOLIC BLOOD PRESSURE: 145 MMHG

## 2022-06-27 VITALS
TEMPERATURE: 98.7 F | RESPIRATION RATE: 18 BRPM | HEART RATE: 102 BPM | SYSTOLIC BLOOD PRESSURE: 147 MMHG | OXYGEN SATURATION: 97 % | DIASTOLIC BLOOD PRESSURE: 85 MMHG

## 2022-06-27 DIAGNOSIS — D62 ACUTE POSTHEMORRHAGIC ANEMIA: Primary | ICD-10-CM

## 2022-06-27 DIAGNOSIS — D64.9 ANEMIA, UNSPECIFIED TYPE: Primary | ICD-10-CM

## 2022-06-27 LAB
ABO/RH(D): NORMAL
ANTIBODY SCREEN: NEGATIVE
BLD PROD TYP BPU: NORMAL
BLOOD COMPONENT TYPE: NORMAL
CODING SYSTEM: NORMAL
CROSSMATCH: NORMAL
ERYTHROCYTE [DISTWIDTH] IN BLOOD BY AUTOMATED COUNT: 21 % (ref 10–15)
HCT VFR BLD AUTO: 19.8 % (ref 40–53)
HGB BLD-MCNC: 6.7 G/DL (ref 13.3–17.7)
ISSUE DATE AND TIME: NORMAL
MCH RBC QN AUTO: 34.2 PG (ref 26.5–33)
MCHC RBC AUTO-ENTMCNC: 33.8 G/DL (ref 31.5–36.5)
MCV RBC AUTO: 101 FL (ref 78–100)
PLATELET # BLD AUTO: 116 10E3/UL (ref 150–450)
RBC # BLD AUTO: 1.96 10E6/UL (ref 4.4–5.9)
SPECIMEN EXPIRATION DATE: NORMAL
UNIT ABO/RH: NORMAL
UNIT NUMBER: NORMAL
UNIT STATUS: NORMAL
UNIT TYPE ISBT: 600
WBC # BLD AUTO: 11.9 10E3/UL (ref 4–11)

## 2022-06-27 PROCEDURE — 36415 COLL VENOUS BLD VENIPUNCTURE: CPT

## 2022-06-27 PROCEDURE — 86923 COMPATIBILITY TEST ELECTRIC: CPT | Performed by: INTERNAL MEDICINE

## 2022-06-27 PROCEDURE — 85027 COMPLETE CBC AUTOMATED: CPT

## 2022-06-27 PROCEDURE — 86901 BLOOD TYPING SEROLOGIC RH(D): CPT | Performed by: INTERNAL MEDICINE

## 2022-06-27 PROCEDURE — P9016 RBC LEUKOCYTES REDUCED: HCPCS | Performed by: INTERNAL MEDICINE

## 2022-06-27 PROCEDURE — 86850 RBC ANTIBODY SCREEN: CPT | Performed by: INTERNAL MEDICINE

## 2022-06-27 PROCEDURE — 36430 TRANSFUSION BLD/BLD COMPNT: CPT

## 2022-06-27 RX ORDER — HEPARIN SODIUM (PORCINE) LOCK FLUSH IV SOLN 100 UNIT/ML 100 UNIT/ML
5 SOLUTION INTRAVENOUS
Status: CANCELLED | OUTPATIENT
Start: 2022-06-27

## 2022-06-27 RX ORDER — FOLIC ACID 1 MG/1
1 TABLET ORAL DAILY
Qty: 90 TABLET | Refills: 1 | Status: SHIPPED | OUTPATIENT
Start: 2022-06-27 | End: 2022-07-07

## 2022-06-27 RX ORDER — HEPARIN SODIUM,PORCINE 10 UNIT/ML
5 VIAL (ML) INTRAVENOUS
Status: CANCELLED | OUTPATIENT
Start: 2022-06-27

## 2022-06-27 NOTE — PROGRESS NOTES
[facility] Hematology and Oncology Progress Note    Patient: Dillon Salter  MRN: [id number]  Date of Service: Jun 16, 2022         Reason for Visit    Chief Complaint   Patient presents with     Oncology Clinic Visit       Assessment and Plan    Cancer Staging  No matching staging information was found for the patient.    ECOG Performance    2 - Ambulatory and independent in all ADLs; cannot work; up > 50% of the time     Pain         #.  Recurrent severe anemia on chronic anemia, initially acute blood loss secondary to variceal bleeding.  #.  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  Moderate to severe thrombocytopenia, secondary to liver disease  #.  Cirrhosis of the liver with portal hypertension, secondary to alcohol use  #.  Hepatic encephalopathy     He is encephalopathic today likely from Xanax use.  Reported that he has been using lactulose 20 g 3 times a day and he has at least 2-3 good bowel movement every day.  I recommended them to limit the use of benzodiazepine in the setting of severe hepatic impairment.  His mom voiced understanding.   Regarding anemia, his anemia is multifactorial with underlying chronic hemolysis.  He will need a follow-up EGD.  Because of his medical complexity, outpatient EGD was canceled.  We will assist in coordinating to complete EGD in hospital setting.  If the repeat EGD is unremarkable, consider doing a unilateral bone marrow biopsy aspiration to rule out disease.    We are trying to accommodate transfusion here instead of him going to the emergency room with severe anemia symptoms.  We decided to continue with weekly labs and transfusion once a week as needed to keep hemoglobin greater than 7 g/dL.  I recommended him to continue folic acid every day.  I also recommended to continue vitamin B12 as well.  Folic acid refilled today.   Follow-up provider visit in about 3-4 weeks.    Encounter Diagnoses:    Problem List Items Addressed This Visit      Alcoholic cirrhosis of liver with ascites (H) (Chronic)    Relevant Medications    folic acid (FOLVITE) 1 MG tablet    Acute posthemorrhagic anemia - Primary    Relevant Medications    folic acid (FOLVITE) 1 MG tablet    Other Relevant Orders    CBC with platelets    Reticulocyte count    Hepatic encephalopathy (H)             CC: Gary Rodrigues MD   ______________________________________________________________________________    History of Present Illness    Mr. Dillon Salter presented today accompanied by his parents.  Leticia was quite sleepy during the encounter but arousable although unable to maintain alertness.  He took Xanax at 3:00 this morning as he could not sleep and he was very anxious about coming to clinic today.  He does not normally take any benzodiazepine.  He has not drink alcohol.  No recreational drugs.  He has generalized body aches and fatigue.    His EGD was canceled as it was not able to completed as outpatient.    Review of systems  Apart from describing in HPI, the remainder of comprehensive ROS was negative.    Past History    Past Medical History:   Diagnosis Date     Diabetes (H)        Past Surgical History:   Procedure Laterality Date     ESOPHAGOSCOPY, GASTROSCOPY, DUODENOSCOPY (EGD), COMBINED N/A 5/24/2022    Procedure: ESOPHAGOGASTRODUODENOSCOPY (EGD) with esophageal banding;  Surgeon: Gary Hurst MD;  Location: [facility] OR     ESOPHAGOSCOPY, GASTROSCOPY, DUODENOSCOPY (EGD), COMBINED N/A 6/20/2022    Procedure: ESOPHAGOGASTRODUODENOSCOPY;  Surgeon: Gavino Reza MD;  Location: [facility] OR     MIDLINE DOUBLE LUMEN PLACEMENT  5/26/2022            Physical Exam    /48   Pulse 98   Temp 97.7  F (36.5  C) (Oral)   Resp 16   Wt 56.7 kg (125 lb)   SpO2 97%   BMI 20.80 kg/m      General: sleepy, arousable, not in acute distress.  Cachectic  HEENT: Head: Normal, normocephalic, atraumatic.  Eye: icteric sclera  Nose: Normal external  nose, mucus membranes and septum.  Neck / Thyroid: Supple, no masses, nodes, nodules or enlargement.  Lymphatics: No abnormally enlarged lymph nodes.  Chest: Normal chest wall and respirations. Clear to auscultation.  Heart: S1 S2 RRR, no murmur.   Abdomen: abdomen is soft without significant tenderness, masses, organomegaly or guarding  Extremities: normal strength, tone, and muscle mass  Skin: Jaundice.  CNS: non focal.    Lab Results    Recent Results (from the past 168 hour(s))   Adult Type and Screen   Result Value Ref Range    ABO/RH(D) A NEG     Antibody Screen Negative Negative    SPECIMEN EXPIRATION DATE [id number]    CBC with platelets and differential   Result Value Ref Range    WBC Count 11.3 (H) 4.0 - 11.0 10e3/uL    RBC Count 1.90 (L) 4.40 - 5.90 10e6/uL    Hemoglobin 6.5 (LL) 13.3 - 17.7 g/dL    Hematocrit 19.5 (L) 40.0 - 53.0 %     (H) 78 - 100 fL    MCH 34.2 (H) 26.5 - 33.0 pg    MCHC 33.3 31.5 - 36.5 g/dL    RDW 21.6 (H) 10.0 - 15.0 %    Platelet Count 125 (L) 150 - 450 10e3/uL   Manual Differential   Result Value Ref Range    % Neutrophils 70 %    % Lymphocytes 18 %    % Monocytes 4 %    % Eosinophils 1 %    % Basophils 5 %    % Metamyelocytes 2 %    Absolute Neutrophils 7.9 1.6 - 8.3 10e3/uL    Absolute Lymphocytes 2.0 0.8 - 5.3 10e3/uL    Absolute Monocytes 0.5 0.0 - 1.3 10e3/uL    Absolute Eosinophils 0.1 0.0 - 0.7 10e3/uL    Absolute Basophils 0.6 (H) 0.0 - 0.2 10e3/uL    Absolute Metamyelocytes 0.2 (H) <=0.0 10e3/uL    RBC Morphology Confirmed RBC Indices     Platelet Assessment  Automated Count Confirmed. Platelet morphology is normal.     Automated Count Confirmed. Platelet morphology is normal.    Acanthocytes Slight (A) None Seen    Basophilic Stippling Present (A) None Seen    Syd Cells Slight (A) None Seen    Polychromasia Slight (A) None Seen   Prepare red blood cells (unit)   Result Value Ref Range    CROSSMATCH Compatible     UNIT ABO/RH A Neg     Unit Number  [id number]     Unit Status Transfused     Blood Component Type Red Blood Cells     Product Code G3045Z07     CODING SYSTEM [id number]     UNIT TYPE ISBT 0600     ISSUE DATE AND TIME [id number]    CBC with platelets   Result Value Ref Range    WBC Count 11.9 (H) 4.0 - 11.0 10e3/uL    RBC Count 1.96 (L) 4.40 - 5.90 10e6/uL    Hemoglobin 6.7 (LL) 13.3 - 17.7 g/dL    Hematocrit 19.8 (L) 40.0 - 53.0 %     (H) 78 - 100 fL    MCH 34.2 (H) 26.5 - 33.0 pg    MCHC 33.8 31.5 - 36.5 g/dL    RDW 21.0 (H) 10.0 - 15.0 %    Platelet Count 116 (L) 150 - 450 10e3/uL   Adult Type and Screen   Result Value Ref Range    ABO/RH(D) A NEG     Antibody Screen Negative Negative    SPECIMEN EXPIRATION DATE [id number]    Prepare red blood cells (unit)   Result Value Ref Range    CROSSMATCH Compatible     UNIT ABO/RH A Neg     Unit Number [id number]     Unit Status Issued     Blood Component Type Red Blood Cells     Product Code S1133C29     CODING SYSTEM [id number]     UNIT TYPE ISBT 0600     ISSUE DATE AND TIME [id number]        Imaging    US Abdomen Limited    Result Date: 6/18/2022  EXAM: US ABDOMEN LIMITED LOCATION: [facility] DATE/TIME: 6/18/2022 6:07 PM INDICATION:. Alcoholic cirrhosis. Status change. assess for eough ascites for paracentesis COMPARISON: 5/31/2022 TECHNIQUE: Limited abdominal ultrasound. FINDINGS: Limited exam performed targeting the entire peritoneal cavity evaluating for ascites volume. No ascites identified. No paracentesis performed.     IMPRESSION: 1.  No ascites identified.     US Abdomen Limited    Result Date: 5/31/2022  EXAM: US ABDOMEN LIMITED LOCATION: [facility] DATE/TIME: 5/31/2022 8:01 AM INDICATION: Abdominal distension. Check for ascites. COMPARISON: None. TECHNIQUE: Limited abdominal ultrasound. FINDINGS AND     IMPRESSION: Trace ascites is noted near the liver.    XR Chest Port 1 View    Result Date: 6/18/2022  EXAM: XR CHEST PORT 1  VIEW LOCATION: [facility] DATE/TIME: 6/18/2022 6:59 PM INDICATION: Altered mental status, fatigue, vomiting COMPARISON: 5/23/2022     IMPRESSION: Patchy right infrahilar opacities favored to represent atelectasis although developing infection is in the differential. Left lung is clear. No effusions. Heart size is normal.    30 minutes spent on the date of the encounter doing chart review, history and exam, documentation and further activities as noted above.    Signed by: Tze Riojas MD

## 2022-06-27 NOTE — PROGRESS NOTES
Infusion Nursing Note:  Dillon Salter presents today for labs and possible blood transfusion.    Patient seen by provider today: No   present during visit today: Not Applicable.    Note: BP (!) 147/85   Pulse 102   Temp 98.7  F (37.1  C) (Oral)   Resp 18   SpO2 97% .    Intravenous Access:  Labs drawn without difficulty.  Peripheral IV placed.    Treatment Conditions:  Lab Results   Component Value Date    HGB 6.7 (LL) 06/27/2022    WBC 11.9 (H) 06/27/2022    ANEU 7.9 06/23/2022    ANEUTAUTO 7.3 06/14/2022     (L) 06/27/2022      Results reviewed, labs MET treatment parameters, ok to proceed with transfusion. Hemoglobin 6.7 today.  Blood transfusion consent signed 6/27/22.    Post Infusion Assessment:  1 unit of blood transfused. Patient tolerated infusion without incident.  Site patent and intact, free from redness, edema or discomfort.  No evidence of extravasations.  Access discontinued per protocol.     Discharge Plan:   Patient and/or family verbalized understanding of discharge instructions and all questions answered.  Patient discharged in stable condition accompanied by: mother.  Departure Mode: Ambulatory.      Sony Mckeon RN

## 2022-06-30 ENCOUNTER — LAB (OUTPATIENT)
Dept: INFUSION THERAPY | Facility: CLINIC | Age: 27
End: 2022-06-30
Attending: INTERNAL MEDICINE
Payer: COMMERCIAL

## 2022-06-30 VITALS
RESPIRATION RATE: 16 BRPM | TEMPERATURE: 97.5 F | DIASTOLIC BLOOD PRESSURE: 85 MMHG | OXYGEN SATURATION: 96 % | HEART RATE: 105 BPM | SYSTOLIC BLOOD PRESSURE: 136 MMHG

## 2022-06-30 VITALS
TEMPERATURE: 97.5 F | HEART RATE: 109 BPM | SYSTOLIC BLOOD PRESSURE: 143 MMHG | DIASTOLIC BLOOD PRESSURE: 74 MMHG | OXYGEN SATURATION: 95 % | RESPIRATION RATE: 18 BRPM

## 2022-06-30 DIAGNOSIS — D62 ACUTE POSTHEMORRHAGIC ANEMIA: Primary | ICD-10-CM

## 2022-06-30 LAB
ACANTHOCYTES BLD QL SMEAR: SLIGHT
BASOPHILS # BLD MANUAL: 0.6 10E3/UL (ref 0–0.2)
BASOPHILS NFR BLD MANUAL: 5 %
BLD PROD TYP BPU: NORMAL
BLOOD COMPONENT TYPE: NORMAL
BURR CELLS BLD QL SMEAR: SLIGHT
CODING SYSTEM: NORMAL
CROSSMATCH: NORMAL
EOSINOPHIL # BLD MANUAL: 0.1 10E3/UL (ref 0–0.7)
EOSINOPHIL NFR BLD MANUAL: 1 %
ERYTHROCYTE [DISTWIDTH] IN BLOOD BY AUTOMATED COUNT: 20.2 % (ref 10–15)
HCT VFR BLD AUTO: 18.8 % (ref 40–53)
HGB BLD-MCNC: 6.7 G/DL (ref 13.3–17.7)
ISSUE DATE AND TIME: NORMAL
LYMPHOCYTES # BLD MANUAL: 2.5 10E3/UL (ref 0.8–5.3)
LYMPHOCYTES NFR BLD MANUAL: 21 %
MCH RBC QN AUTO: 35.3 PG (ref 26.5–33)
MCHC RBC AUTO-ENTMCNC: 35.6 G/DL (ref 31.5–36.5)
MCV RBC AUTO: 99 FL (ref 78–100)
METAMYELOCYTES # BLD MANUAL: 0.2 10E3/UL
METAMYELOCYTES NFR BLD MANUAL: 2 %
MONOCYTES # BLD MANUAL: 0.4 10E3/UL (ref 0–1.3)
MONOCYTES NFR BLD MANUAL: 3 %
NEUTROPHILS # BLD MANUAL: 8.1 10E3/UL (ref 1.6–8.3)
NEUTROPHILS NFR BLD MANUAL: 68 %
PLAT MORPH BLD: ABNORMAL
PLATELET # BLD AUTO: 103 10E3/UL (ref 150–450)
POLYCHROMASIA BLD QL SMEAR: SLIGHT
RBC # BLD AUTO: 1.9 10E6/UL (ref 4.4–5.9)
RBC MORPH BLD: ABNORMAL
UNIT ABO/RH: NORMAL
UNIT NUMBER: NORMAL
UNIT STATUS: NORMAL
UNIT TYPE ISBT: 600
WBC # BLD AUTO: 11.9 10E3/UL (ref 4–11)

## 2022-06-30 PROCEDURE — 36430 TRANSFUSION BLD/BLD COMPNT: CPT

## 2022-06-30 PROCEDURE — 36415 COLL VENOUS BLD VENIPUNCTURE: CPT | Performed by: INTERNAL MEDICINE

## 2022-06-30 PROCEDURE — 85007 BL SMEAR W/DIFF WBC COUNT: CPT | Performed by: INTERNAL MEDICINE

## 2022-06-30 PROCEDURE — 85027 COMPLETE CBC AUTOMATED: CPT | Performed by: INTERNAL MEDICINE

## 2022-06-30 PROCEDURE — P9016 RBC LEUKOCYTES REDUCED: HCPCS | Performed by: INTERNAL MEDICINE

## 2022-06-30 RX ORDER — HEPARIN SODIUM (PORCINE) LOCK FLUSH IV SOLN 100 UNIT/ML 100 UNIT/ML
5 SOLUTION INTRAVENOUS
Status: CANCELLED | OUTPATIENT
Start: 2022-06-30

## 2022-06-30 RX ORDER — HEPARIN SODIUM,PORCINE 10 UNIT/ML
5 VIAL (ML) INTRAVENOUS
Status: CANCELLED | OUTPATIENT
Start: 2022-06-30

## 2022-06-30 NOTE — PROGRESS NOTES
Infusion Nursing Note:  Dillon Salter presents today for labs and possible blood.    Patient seen by provider today: No   present during visit today: Not Applicable.    Note: BP (!) 143/74   Pulse 109   Temp 97.5  F (36.4  C) (Oral)   Resp 18   SpO2 95%     Intravenous Access:  Peripheral IV placed.    Treatment Conditions:  Lab Results   Component Value Date    HGB 6.7 (LL) 06/30/2022    WBC 11.9 (H) 06/30/2022    ANEU 8.1 06/30/2022    ANEUTAUTO 7.3 06/14/2022     (L) 06/30/2022      Results reviewed, labs MET treatment parameters, ok to proceed with treatment.  Hemoglobin 6.7 therefore 1 unit ordered. Blood consent signed 6/27/22.    Post Infusion Assessment:  1 unit of blood transfused. Patient tolerated infusion without incident.  Blood return noted pre and post infusion.  Site patent and intact, free from redness, edema or discomfort.  No evidence of extravasations.  Access discontinued per protocol.     Discharge Plan:   Patient and/or family verbalized understanding of discharge instructions and all questions answered.  Patient discharged in stable condition accompanied by: self.  Departure Mode: Ambulatory.      Sony Mckeon RN

## 2022-06-30 NOTE — PROGRESS NOTES
Infusion Nursing Note:  Dillon HAMMOND Salter presents today for labs and possible blood.    Patient seen by provider today: No   present during visit today: Not Applicable.    Note: ***.    Intravenous Access:  Labs drawn without difficulty.  Peripheral IV placed in right AC.    Treatment Conditions:  {UMHTXCONDITIONS:284322}    Post Infusion Assessment:  {UMHPOSTINFUSION:341321}     Discharge Plan:   {UMHDISCHARGE:709804}      Zoe Garcia RN

## 2022-07-05 ENCOUNTER — LAB (OUTPATIENT)
Dept: INFUSION THERAPY | Facility: CLINIC | Age: 27
End: 2022-07-05
Attending: INTERNAL MEDICINE
Payer: COMMERCIAL

## 2022-07-05 VITALS
DIASTOLIC BLOOD PRESSURE: 69 MMHG | HEART RATE: 98 BPM | TEMPERATURE: 98 F | OXYGEN SATURATION: 98 % | SYSTOLIC BLOOD PRESSURE: 133 MMHG

## 2022-07-05 VITALS
OXYGEN SATURATION: 96 % | HEART RATE: 95 BPM | DIASTOLIC BLOOD PRESSURE: 64 MMHG | SYSTOLIC BLOOD PRESSURE: 121 MMHG | TEMPERATURE: 98 F | RESPIRATION RATE: 18 BRPM

## 2022-07-05 DIAGNOSIS — D62 ACUTE POSTHEMORRHAGIC ANEMIA: Primary | ICD-10-CM

## 2022-07-05 LAB
ABO/RH(D): NORMAL
ANTIBODY SCREEN: NEGATIVE
BASOPHILS # BLD AUTO: 0.2 10E3/UL (ref 0–0.2)
BASOPHILS NFR BLD AUTO: 2 %
BLD PROD TYP BPU: NORMAL
BLOOD COMPONENT TYPE: NORMAL
CODING SYSTEM: NORMAL
CROSSMATCH: NORMAL
EOSINOPHIL # BLD AUTO: 0.3 10E3/UL (ref 0–0.7)
EOSINOPHIL NFR BLD AUTO: 3 %
ERYTHROCYTE [DISTWIDTH] IN BLOOD BY AUTOMATED COUNT: 19.5 % (ref 10–15)
HCT VFR BLD AUTO: 20 % (ref 40–53)
HGB BLD-MCNC: 6.8 G/DL (ref 13.3–17.7)
IMM GRANULOCYTES # BLD: 0.1 10E3/UL
IMM GRANULOCYTES NFR BLD: 1 %
ISSUE DATE AND TIME: NORMAL
LYMPHOCYTES # BLD AUTO: 2.2 10E3/UL (ref 0.8–5.3)
LYMPHOCYTES NFR BLD AUTO: 25 %
MCH RBC QN AUTO: 33.2 PG (ref 26.5–33)
MCHC RBC AUTO-ENTMCNC: 34 G/DL (ref 31.5–36.5)
MCV RBC AUTO: 98 FL (ref 78–100)
MONOCYTES # BLD AUTO: 0.6 10E3/UL (ref 0–1.3)
MONOCYTES NFR BLD AUTO: 6 %
NEUTROPHILS # BLD AUTO: 5.6 10E3/UL (ref 1.6–8.3)
NEUTROPHILS NFR BLD AUTO: 63 %
NRBC # BLD AUTO: 0 10E3/UL
NRBC BLD AUTO-RTO: 0 /100
PLATELET # BLD AUTO: 87 10E3/UL (ref 150–450)
RBC # BLD AUTO: 2.05 10E6/UL (ref 4.4–5.9)
SPECIMEN EXPIRATION DATE: NORMAL
UNIT ABO/RH: NORMAL
UNIT NUMBER: NORMAL
UNIT STATUS: NORMAL
UNIT TYPE ISBT: 600
WBC # BLD AUTO: 8.9 10E3/UL (ref 4–11)

## 2022-07-05 PROCEDURE — 36430 TRANSFUSION BLD/BLD COMPNT: CPT

## 2022-07-05 PROCEDURE — 86923 COMPATIBILITY TEST ELECTRIC: CPT | Performed by: INTERNAL MEDICINE

## 2022-07-05 PROCEDURE — 36415 COLL VENOUS BLD VENIPUNCTURE: CPT | Performed by: INTERNAL MEDICINE

## 2022-07-05 PROCEDURE — 85025 COMPLETE CBC W/AUTO DIFF WBC: CPT | Performed by: INTERNAL MEDICINE

## 2022-07-05 PROCEDURE — P9016 RBC LEUKOCYTES REDUCED: HCPCS | Performed by: INTERNAL MEDICINE

## 2022-07-05 PROCEDURE — 86850 RBC ANTIBODY SCREEN: CPT | Performed by: INTERNAL MEDICINE

## 2022-07-05 RX ORDER — HEPARIN SODIUM,PORCINE 10 UNIT/ML
5 VIAL (ML) INTRAVENOUS
Status: CANCELLED | OUTPATIENT
Start: 2022-07-05

## 2022-07-05 RX ORDER — HEPARIN SODIUM (PORCINE) LOCK FLUSH IV SOLN 100 UNIT/ML 100 UNIT/ML
5 SOLUTION INTRAVENOUS
Status: CANCELLED | OUTPATIENT
Start: 2022-07-05

## 2022-07-05 NOTE — PROGRESS NOTES
Infusion Nursing Note:  Dillon Salter presents today for labs and possible blood.    Patient seen by provider today: No   present during visit today: Not Applicable.    Note: /64   Pulse 95   Temp 98  F (36.7  C)   Resp 18   SpO2 96% .    Intravenous Access:  Labs drawn without difficulty.  Peripheral IV placed.    Treatment Conditions:  Lab Results   Component Value Date    HGB 6.8 (LL) 07/05/2022    WBC 8.9 07/05/2022    ANEU 8.1 06/30/2022    ANEUTAUTO 5.6 07/05/2022    PLT 87 (L) 07/05/2022      Results reviewed, labs MET treatment parameters, ok to proceed with treatment.  Hemoglobin 6.8 today, therefore 1 unit of blood ordered.     Post Infusion Assessment:  1 unit of blood transfused. Patient tolerated infusion without incident.  Blood return noted pre and post infusion.  Site patent and intact, free from redness, edema or discomfort.  No evidence of extravasations.  Access discontinued per protocol.     Discharge Plan:   Patient and/or family verbalized understanding of discharge instructions and all questions answered.  Patient discharged in stable condition accompanied by: self.  Departure Mode: Ambulatory.      Sony Mckeon RN

## 2022-07-07 ENCOUNTER — ONCOLOGY VISIT (OUTPATIENT)
Dept: ONCOLOGY | Facility: CLINIC | Age: 27
End: 2022-07-07
Attending: NURSE PRACTITIONER
Payer: COMMERCIAL

## 2022-07-07 ENCOUNTER — LAB (OUTPATIENT)
Dept: INFUSION THERAPY | Facility: CLINIC | Age: 27
End: 2022-07-07
Attending: INTERNAL MEDICINE
Payer: COMMERCIAL

## 2022-07-07 ENCOUNTER — TELEPHONE (OUTPATIENT)
Dept: BEHAVIORAL HEALTH | Facility: CLINIC | Age: 27
End: 2022-07-07

## 2022-07-07 VITALS
WEIGHT: 148.9 LBS | DIASTOLIC BLOOD PRESSURE: 64 MMHG | SYSTOLIC BLOOD PRESSURE: 141 MMHG | HEIGHT: 65 IN | BODY MASS INDEX: 24.81 KG/M2 | HEART RATE: 100 BPM | OXYGEN SATURATION: 99 %

## 2022-07-07 DIAGNOSIS — D62 ACUTE POSTHEMORRHAGIC ANEMIA: ICD-10-CM

## 2022-07-07 DIAGNOSIS — E66.9 DIABETES MELLITUS TYPE 2 IN OBESE: Chronic | ICD-10-CM

## 2022-07-07 DIAGNOSIS — K70.31 ALCOHOLIC CIRRHOSIS OF LIVER WITH ASCITES (H): Primary | ICD-10-CM

## 2022-07-07 DIAGNOSIS — E83.42 HYPOMAGNESEMIA: ICD-10-CM

## 2022-07-07 DIAGNOSIS — E11.69 DIABETES MELLITUS TYPE 2 IN OBESE: Chronic | ICD-10-CM

## 2022-07-07 LAB
ALBUMIN SERPL-MCNC: 2.9 G/DL (ref 3.5–5)
ALP SERPL-CCNC: 195 U/L (ref 45–120)
ALT SERPL W P-5'-P-CCNC: 38 U/L (ref 0–45)
ANION GAP SERPL CALCULATED.3IONS-SCNC: 9 MMOL/L (ref 5–18)
AST SERPL W P-5'-P-CCNC: 103 U/L (ref 0–40)
BILIRUB SERPL-MCNC: 13.9 MG/DL (ref 0–1)
BUN SERPL-MCNC: 37 MG/DL (ref 8–22)
CALCIUM SERPL-MCNC: 9.1 MG/DL (ref 8.5–10.5)
CHLORIDE BLD-SCNC: 97 MMOL/L (ref 98–107)
CO2 SERPL-SCNC: 30 MMOL/L (ref 22–31)
CREAT SERPL-MCNC: 0.86 MG/DL (ref 0.7–1.3)
ERYTHROCYTE [DISTWIDTH] IN BLOOD BY AUTOMATED COUNT: 18.5 % (ref 10–15)
GFR SERPL CREATININE-BSD FRML MDRD: >90 ML/MIN/1.73M2
GLUCOSE BLD-MCNC: 219 MG/DL (ref 70–125)
HCT VFR BLD AUTO: 21.3 % (ref 40–53)
HGB BLD-MCNC: 7.3 G/DL (ref 13.3–17.7)
HOLD SPECIMEN: NORMAL
HOLD SPECIMEN: NORMAL
MAGNESIUM SERPL-MCNC: 1.8 MG/DL (ref 1.8–2.6)
MCH RBC QN AUTO: 33.5 PG (ref 26.5–33)
MCHC RBC AUTO-ENTMCNC: 34.3 G/DL (ref 31.5–36.5)
MCV RBC AUTO: 98 FL (ref 78–100)
PLATELET # BLD AUTO: 98 10E3/UL (ref 150–450)
POTASSIUM BLD-SCNC: 4.6 MMOL/L (ref 3.5–5)
PROT SERPL-MCNC: 7.3 G/DL (ref 6–8)
RBC # BLD AUTO: 2.18 10E6/UL (ref 4.4–5.9)
RETICS # AUTO: 0.17 10E6/UL (ref 0.01–0.11)
RETICS/RBC NFR AUTO: 7.8 % (ref 0.8–2.7)
SODIUM SERPL-SCNC: 136 MMOL/L (ref 136–145)
WBC # BLD AUTO: 7.9 10E3/UL (ref 4–11)

## 2022-07-07 PROCEDURE — 36415 COLL VENOUS BLD VENIPUNCTURE: CPT

## 2022-07-07 PROCEDURE — 85045 AUTOMATED RETICULOCYTE COUNT: CPT | Performed by: INTERNAL MEDICINE

## 2022-07-07 PROCEDURE — 83735 ASSAY OF MAGNESIUM: CPT | Performed by: INTERNAL MEDICINE

## 2022-07-07 PROCEDURE — 80053 COMPREHEN METABOLIC PANEL: CPT | Performed by: INTERNAL MEDICINE

## 2022-07-07 PROCEDURE — 85027 COMPLETE CBC AUTOMATED: CPT

## 2022-07-07 PROCEDURE — G0463 HOSPITAL OUTPT CLINIC VISIT: HCPCS

## 2022-07-07 PROCEDURE — 99215 OFFICE O/P EST HI 40 MIN: CPT | Performed by: INTERNAL MEDICINE

## 2022-07-07 RX ORDER — FOLIC ACID 1 MG/1
1 TABLET ORAL DAILY
Qty: 90 TABLET | Refills: 1 | Status: SHIPPED | OUTPATIENT
Start: 2022-07-07 | End: 2022-08-04

## 2022-07-07 RX ORDER — INSULIN GLARGINE 100 [IU]/ML
45 INJECTION, SOLUTION SUBCUTANEOUS AT BEDTIME
Qty: 3 ML | Refills: 0 | Status: ON HOLD
Start: 2022-07-07 | End: 2022-08-02

## 2022-07-07 RX ORDER — MAGNESIUM OXIDE 400 MG/1
400 TABLET ORAL DAILY
Qty: 60 TABLET | Refills: 0 | Status: SHIPPED | OUTPATIENT
Start: 2022-07-07 | End: 2022-08-14

## 2022-07-07 RX ORDER — GABAPENTIN 100 MG/1
100 CAPSULE ORAL DAILY PRN
COMMUNITY
Start: 2022-06-27 | End: 2022-08-16

## 2022-07-07 RX ORDER — PREDNISONE 10 MG/1
10 TABLET ORAL DAILY
Qty: 30 TABLET | Refills: 0 | Status: ON HOLD | OUTPATIENT
Start: 2022-07-07 | End: 2022-08-16

## 2022-07-07 ASSESSMENT — PAIN SCALES - GENERAL: PAINLEVEL: EXTREME PAIN (8)

## 2022-07-07 NOTE — PROGRESS NOTES
"Oncology Rooming Note    July 7, 2022 2:51 PM   Dillon Salter is a 26 year old male who presents for:    Chief Complaint   Patient presents with     Hematology     Acute posthemorrhagic anemia,  Hepatic encephalopathy,  Alcoholic cirrhosis of liver without ascites     Initial Vitals: BP (!) 141/64 (BP Location: Left arm, Patient Position: Sitting, Cuff Size: Adult Regular)   Pulse 100   Ht 1.651 m (5' 5\")   Wt 67.5 kg (148 lb 14.4 oz)   SpO2 99%   BMI 24.78 kg/m   Estimated body mass index is 24.78 kg/m  as calculated from the following:    Height as of this encounter: 1.651 m (5' 5\").    Weight as of this encounter: 67.5 kg (148 lb 14.4 oz). Body surface area is 1.76 meters squared.  Extreme Pain (8) Comment: Data Unavailable   No LMP for male patient.  Allergies reviewed: Yes  Medications reviewed: Yes    Medications: MEDICATION REFILLS NEEDED TODAY. Provider was notified.  Pharmacy name entered into [facility]: [id number] - [place], [place] - [address] AT [facility]    Clinical concerns: follow up, with labs      Kelsea Yang MA            "

## 2022-07-07 NOTE — LETTER
"    7/7/2022         RE: Dillon Salter  [address] [facility] [address]        Dear Colleague,    Thank you for referring your patient, Dillon Salter, to the [facility]. Please see a copy of my visit note below.    Oncology Rooming Note    July 7, 2022 2:51 PM   Dillon Salter is a 26 year old male who presents for:    Chief Complaint   Patient presents with     Hematology     Acute posthemorrhagic anemia,  Hepatic encephalopathy,  Alcoholic cirrhosis of liver without ascites     Initial Vitals: BP (!) 141/64 (BP Location: Left arm, Patient Position: Sitting, Cuff Size: Adult Regular)   Pulse 100   Ht 1.651 m (5' 5\")   Wt 67.5 kg (148 lb 14.4 oz)   SpO2 99%   BMI 24.78 kg/m   Estimated body mass index is 24.78 kg/m  as calculated from the following:    Height as of this encounter: 1.651 m (5' 5\").    Weight as of this encounter: 67.5 kg (148 lb 14.4 oz). Body surface area is 1.76 meters squared.  Extreme Pain (8) Comment: Data Unavailable   No LMP for male patient.  Allergies reviewed: Yes  Medications reviewed: Yes    Medications: MEDICATION REFILLS NEEDED TODAY. Provider was notified.  Pharmacy name entered into [facility]: [id number] [address] [facility] [address] [facility]  AT [facility]    Clinical concerns: follow up, with labs      Kelsea Yang MA              [facility] Hematology and Oncology Progress Note    Patient: Dillon Salter  MRN: [id number]  Date of Service: Jul 7, 2022         Reason for Visit    Chief Complaint   Patient presents with     Hematology     Acute posthemorrhagic anemia,  Hepatic encephalopathy,  Alcoholic cirrhosis of liver without ascites       Assessment and Plan    Cancer Staging  No matching staging information was found for the patient.    ECOG Performance    2 - Ambulatory and independent in all ADLs; cannot work; up > 50% of the time     Pain  Pain Score: Extreme Pain (8)  Pain Loc: Other - see " comment (Hip, calfs, knee, knee cap)      #.  Recurrent severe anemia on chronic anemia, initially acute blood loss secondary to variceal bleeding followed by mucosal oozing with friable gastric mucosa.  #.  New onset ascites  #.  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  Moderate to severe thrombocytopenia, secondary to liver disease  #.  Cirrhosis of the liver with portal hypertension, secondary to alcohol use  #.  Hepatic encephalopathy, resolved     I reviewed the EGD results from 6/18/2022.  He has ongoing anemia from mucosa losing.  Patient, he appears to have ongoing hemolysis.  Does not appear to have underlying malignant blood disease.  He is reminded to take Protonix 40 mg twice daily as current.  I discussed about a trial of very low-dose prednisone to see whether it provides therapeutic benefits from it.  I will try for a couple weeks and will stop if it does not work. In the mean time, he will continue to need transfusion support.  We will continue with twice a week labs and blood transfusion as needed.   Low-dose steroid will help his arthritis and arthralgia.  I advised him to avoid use of over-the-counter NSAIDs absolutely.  He also advised to Tylenol use not more than 2000 mg daily.   I advised him to continue folic acid and refill today.   Because of the ascites on examination and some discomfort, requested ultrasound paracentesis and diagnostic as well as therapeutic.  He will be follow-up with his primary care provider next week.  I deferred the recommendation of diuretic adjustment per PCP or hepatology team.    I congratulated him on his alcohol abstinence for 92 days today.  I advised him to follow-up with hepatology clinic at [facility].     Encounter Diagnoses:    Problem List Items Addressed This Visit     Alcoholic cirrhosis of liver with ascites (H) - Primary (Chronic)    Relevant Medications    predniSONE (DELTASONE) 10 MG tablet    folic acid (FOLVITE) 1 MG tablet     magnesium oxide (MAG-OX) 400 MG tablet    Other Relevant Orders    Comprehensive metabolic panel (Completed)    Magnesium (Completed)    Cell count with differential fluid    Protein fluid    Albumin fluid    US Paracentesis without Albumin    Diabetes mellitus type 2 in obese (H) (Chronic)    Relevant Medications    predniSONE (DELTASONE) 10 MG tablet    insulin glargine (LANTUS SOLOSTAR) 100 UNIT/ML pen      Other Visit Diagnoses     Hypomagnesemia        Relevant Orders    Magnesium (Completed)             CC: Gary Rodrigues MD   ______________________________________________________________________________    History of Present Illness    Mr. Dillon Salter presented today accompanied by his mother.     Dillon required blood transfusion about 2 units a week.  He has intermittent gum bleeding and he thinks the last time was last night and he might have bitten something when he was dreaming.  He had a follow-up EGD on 6/18/2022 and it showed portal hypertensive gastropathy, grade 1 varices with healing banding ulcers.  It was described that the mucosa was friable and oozing with scope contact.  He has been taking all the medication as directed.  He reported he has significant pain in the joints and his legs and wondering whether he can go to chiropractor.   His last visit, he was admitted to the hospital with hepatic encephalopathy secondary to benzodiazepine.   Infection symptoms.  He reported he vomited about 3 days ago.    His weight is up about 19% and he feels like his abdomen is swollen.    Review of systems  Apart from describing in HPI, the remainder of comprehensive ROS was negative.    Past History    Past Medical History:   Diagnosis Date     Diabetes (H)        Past Surgical History:   Procedure Laterality Date     ESOPHAGOSCOPY, GASTROSCOPY, DUODENOSCOPY (EGD), COMBINED N/A 5/24/2022    Procedure: ESOPHAGOGASTRODUODENOSCOPY (EGD) with esophageal banding;  Surgeon: Gary Hurst MD;   "Location: [facility] Main OR     ESOPHAGOSCOPY, GASTROSCOPY, DUODENOSCOPY (EGD), COMBINED N/A 6/20/2022    Procedure: ESOPHAGOGASTRODUODENOSCOPY;  Surgeon: Gavino Reza MD;  Location: [facility] Main OR     MIDLINE DOUBLE LUMEN PLACEMENT  5/26/2022            Physical Exam    BP (!) 141/64 (BP Location: Left arm, Patient Position: Sitting, Cuff Size: Adult Regular)   Pulse 100   Ht 1.651 m (5' 5\")   Wt 67.5 kg (148 lb 14.4 oz)   SpO2 99%   BMI 24.78 kg/m      General: alert, awake, not in acute distress  HEENT: Head: Normal, normocephalic, atraumatic.  Eye: Normal external eye, conjunctiva, lids cornea, JUSTYNA.  Pharynx: Normal buccal mucosa. Normal pharynx.  Neck / Thyroid: Supple, no masses, nodes, nodules or enlargement.  Lymphatics: No abnormally enlarged lymph nodes.  Chest: Normal chest wall and respirations. Clear to auscultation.  Heart: S1 S2 RRR.   Abdomen: abdomen is soft without significant tenderness, masses, organomegaly or guarding  Extremities: normal strength, tone, and muscle mass  Skin: normal. no rash or abnormalities  CNS: non focal.      Lab Results    Recent Results (from the past 168 hour(s))   Adult Type and Screen   Result Value Ref Range    ABO/RH(D) A NEG     Antibody Screen Negative Negative    SPECIMEN EXPIRATION DATE [id number]    CBC with platelets and differential   Result Value Ref Range    WBC Count 8.9 4.0 - 11.0 10e3/uL    RBC Count 2.05 (L) 4.40 - 5.90 10e6/uL    Hemoglobin 6.8 (LL) 13.3 - 17.7 g/dL    Hematocrit 20.0 (L) 40.0 - 53.0 %    MCV 98 78 - 100 fL    MCH 33.2 (H) 26.5 - 33.0 pg    MCHC 34.0 31.5 - 36.5 g/dL    RDW 19.5 (H) 10.0 - 15.0 %    Platelet Count 87 (L) 150 - 450 10e3/uL    % Neutrophils 63 %    % Lymphocytes 25 %    % Monocytes 6 %    % Eosinophils 3 %    % Basophils 2 %    % Immature Granulocytes 1 %    NRBCs per 100 WBC 0 <1 /100    Absolute Neutrophils 5.6 1.6 - 8.3 10e3/uL    Absolute Lymphocytes 2.2 0.8 - 5.3 10e3/uL    Absolute " Monocytes 0.6 0.0 - 1.3 10e3/uL    Absolute Eosinophils 0.3 0.0 - 0.7 10e3/uL    Absolute Basophils 0.2 0.0 - 0.2 10e3/uL    Absolute Immature Granulocytes 0.1 <=0.4 10e3/uL    Absolute NRBCs 0.0 10e3/uL   Prepare red blood cells (unit)   Result Value Ref Range    CROSSMATCH Compatible     UNIT ABO/RH A Neg     Unit Number [id number]     Unit Status Transfused     Blood Component Type Red Blood Cells     Product Code [id number]     CODING SYSTEM [id number]     UNIT TYPE ISBT 0600     ISSUE DATE AND TIME [id number]    Reticulocyte count   Result Value Ref Range    % Reticulocyte 7.8 (H) 0.8 - 2.7 %    Absolute Reticulocyte 0.171 (H) 0.010 - 0.110 10e6/uL   CBC with platelets   Result Value Ref Range    WBC Count 7.9 4.0 - 11.0 10e3/uL    RBC Count 2.18 (L) 4.40 - 5.90 10e6/uL    Hemoglobin 7.3 (L) 13.3 - 17.7 g/dL    Hematocrit 21.3 (L) 40.0 - 53.0 %    MCV 98 78 - 100 fL    MCH 33.5 (H) 26.5 - 33.0 pg    MCHC 34.3 31.5 - 36.5 g/dL    RDW 18.5 (H) 10.0 - 15.0 %    Platelet Count 98 (L) 150 - 450 10e3/uL   Extra Red Top Tube   Result Value Ref Range    Hold Specimen [facility]    Extra Blood Bank Purple Top Tube   Result Value Ref Range    Hold Specimen [facility]    Comprehensive metabolic panel   Result Value Ref Range    Sodium 136 136 - 145 mmol/L    Potassium 4.6 3.5 - 5.0 mmol/L    Chloride 97 (L) 98 - 107 mmol/L    Carbon Dioxide (CO2) 30 22 - 31 mmol/L    Anion Gap 9 5 - 18 mmol/L    Urea Nitrogen 37 (H) 8 - 22 mg/dL    Creatinine 0.86 0.70 - 1.30 mg/dL    Calcium 9.1 8.5 - 10.5 mg/dL    Glucose 219 (H) 70 - 125 mg/dL    Alkaline Phosphatase 195 (H) 45 - 120 U/L     (H) 0 - 40 U/L    ALT 38 0 - 45 U/L    Protein Total 7.3 6.0 - 8.0 g/dL    Albumin 2.9 (L) 3.5 - 5.0 g/dL    Bilirubin Total 13.9 (H) 0.0 - 1.0 mg/dL    GFR Estimate >90 >60 mL/min/1.73m2   Magnesium   Result Value Ref Range    Magnesium 1.8 1.8 - 2.6 mg/dL   Extra Green Top (Lithium Heparin) Tube   Result Value Ref Range    Hold Specimen [facility]    Extra  Purple Top Tube   Result Value Ref Range    Hold Specimen     CBC with platelets and differential   Result Value Ref Range    WBC Count 9.3 4.0 - 11.0 10e3/uL    RBC Count 1.82 (L) 4.40 - 5.90 10e6/uL    Hemoglobin 6.3 (LL) 13.3 - 17.7 g/dL    Hematocrit 17.9 (L) 40.0 - 53.0 %    MCV 98 78 - 100 fL    MCH 34.6 (H) 26.5 - 33.0 pg    MCHC 35.2 31.5 - 36.5 g/dL    RDW 18.5 (H) 10.0 - 15.0 %    Platelet Count 125 (L) 150 - 450 10e3/uL   Adult Type and Screen   Result Value Ref Range    ABO/RH(D) A NEG     Antibody Screen Negative Negative    SPECIMEN EXPIRATION DATE [id number]    Manual Differential   Result Value Ref Range    % Neutrophils 70 %    % Lymphocytes 19 %    % Monocytes 5 %    % Eosinophils 4 %    % Basophils 2 %    Absolute Neutrophils 6.5 1.6 - 8.3 10e3/uL    Absolute Lymphocytes 1.8 0.8 - 5.3 10e3/uL    Absolute Monocytes 0.5 0.0 - 1.3 10e3/uL    Absolute Eosinophils 0.4 0.0 - 0.7 10e3/uL    Absolute Basophils 0.2 0.0 - 0.2 10e3/uL    RBC Morphology Confirmed RBC Indices     Platelet Assessment  Automated Count Confirmed. Platelet morphology is normal.     Automated Count Confirmed. Platelet morphology is normal.    [place] Cells Slight (A) None Seen    Polychromasia Slight (A) None Seen   INR   Result Value Ref Range    INR 1.63 (H) 0.85 - 1.15   Prepare red blood cells (unit)   Result Value Ref Range    CROSSMATCH Compatible     UNIT ABO/RH A Neg     Unit Number [id number]     Unit Status Transfused     Blood Component Type Red Blood Cells     Product Code [id number]     CODING SYSTEM [id number]     UNIT TYPE ISBT 0600     ISSUE DATE AND TIME [id number]    Basic metabolic panel   Result Value Ref Range    Sodium 134 (L) 136 - 145 mmol/L    Potassium 4.8 3.5 - 5.0 mmol/L    Chloride 96 (L) 98 - 107 mmol/L    Carbon Dioxide (CO2) 28 22 - 31 mmol/L    Anion Gap 10 5 - 18 mmol/L    Urea Nitrogen 34 (H) 8 - 22 mg/dL    Creatinine 0.73 0.70 - 1.30 mg/dL    Calcium 9.2 8.5 - 10.5 mg/dL    Glucose 185 (H) 70  - 125 mg/dL    GFR Estimate >90 >60 mL/min/1.73m2       Imaging    US Abdomen Limited    Result Date: 6/18/2022  EXAM: US ABDOMEN LIMITED LOCATION: [facility] DATE/TIME: 6/18/2022 6:07 PM INDICATION:. Alcoholic cirrhosis. Status change. assess for eough ascites for paracentesis COMPARISON: 5/31/2022 TECHNIQUE: Limited abdominal ultrasound. FINDINGS: Limited exam performed targeting the entire peritoneal cavity evaluating for ascites volume. No ascites identified. No paracentesis performed.     IMPRESSION: 1.  No ascites identified.     XR Chest Port 1 View    Result Date: 6/18/2022  EXAM: XR CHEST PORT 1 VIEW LOCATION: [facility] DATE/TIME: 6/18/2022 6:59 PM INDICATION: Altered mental status, fatigue, vomiting COMPARISON: 5/23/2022     IMPRESSION: Patchy right infrahilar opacities favored to represent atelectasis although developing infection is in the differential. Left lung is clear. No effusions. Heart size is normal.    40 minutes spent on the date of the encounter doing chart review, history and exam, documentation and further activities as noted above.    Signed by: Tez Riojas MD      Again, thank you for allowing me to participate in the care of your patient.        Sincerely,        Tez Riojas MD

## 2022-07-07 NOTE — PROGRESS NOTES
[facility] Hematology and Oncology Progress Note    Patient: Dillon Salter  MRN: [id number]  Date of Service: Jul 7, 2022         Reason for Visit    Chief Complaint   Patient presents with     Hematology     Acute posthemorrhagic anemia,  Hepatic encephalopathy,  Alcoholic cirrhosis of liver without ascites       Assessment and Plan    Cancer Staging  No matching staging information was found for the patient.    ECOG Performance    2 - Ambulatory and independent in all ADLs; cannot work; up > 50% of the time     Pain  Pain Score: Extreme Pain (8)  Pain Loc: Other - see comment (Hip, calfs, knee, knee cap)      #.  Recurrent severe anemia on chronic anemia, initially acute blood loss secondary to variceal bleeding followed by mucosal oozing with friable gastric mucosa.  #.  New onset ascites  #.  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  Moderate to severe thrombocytopenia, secondary to liver disease  #.  Cirrhosis of the liver with portal hypertension, secondary to alcohol use  #.  Hepatic encephalopathy, resolved     I reviewed the EGD results from 6/18/2022.  He has ongoing anemia from mucosa losing.  Patient, he appears to have ongoing hemolysis.  Does not appear to have underlying malignant blood disease.  He is reminded to take Protonix 40 mg twice daily as current.  I discussed about a trial of very low-dose prednisone to see whether it provides therapeutic benefits from it.  I will try for a couple weeks and will stop if it does not work. In the mean time, he will continue to need transfusion support.  We will continue with twice a week labs and blood transfusion as needed.   Low-dose steroid will help his arthritis and arthralgia.  I advised him to avoid use of over-the-counter NSAIDs absolutely.  He also advised to Tylenol use not more than 2000 mg daily.   I advised him to continue folic acid and refill today.   Because of the ascites on examination and some discomfort,  requested ultrasound paracentesis and diagnostic as well as therapeutic.  He will be follow-up with his primary care provider next week.  I deferred the recommendation of diuretic adjustment per PCP or hepatology team.    I congratulated him on his alcohol abstinence for 92 days today.  I advised him to follow-up with hepatology clinic at [facility].     Encounter Diagnoses:    Problem List Items Addressed This Visit     Alcoholic cirrhosis of liver with ascites (H) - Primary (Chronic)    Relevant Medications    predniSONE (DELTASONE) 10 MG tablet    folic acid (FOLVITE) 1 MG tablet    magnesium oxide (MAG-OX) 400 MG tablet    Other Relevant Orders    Comprehensive metabolic panel (Completed)    Magnesium (Completed)    Cell count with differential fluid    Protein fluid    Albumin fluid    US Paracentesis without Albumin    Diabetes mellitus type 2 in obese (H) (Chronic)    Relevant Medications    predniSONE (DELTASONE) 10 MG tablet    insulin glargine (LANTUS SOLOSTAR) 100 UNIT/ML pen      Other Visit Diagnoses     Hypomagnesemia        Relevant Orders    Magnesium (Completed)             CC: Gary Rodrigues MD   ______________________________________________________________________________    History of Present Illness    Mr. Dillon Salter presented today accompanied by his mother.     Dillon required blood transfusion about 2 units a week.  He has intermittent gum bleeding and he thinks the last time was last night and he might have bitten something when he was dreaming.  He had a follow-up EGD on 6/18/2022 and it showed portal hypertensive gastropathy, grade 1 varices with healing banding ulcers.  It was described that the mucosa was friable and oozing with scope contact.  He has been taking all the medication as directed.  He reported he has significant pain in the joints and his legs and wondering whether he can go to chiropractor.   His last visit, he was admitted to the hospital with hepatic  "encephalopathy secondary to benzodiazepine.   Infection symptoms.  He reported he vomited about 3 days ago.    His weight is up about 19% and he feels like his abdomen is swollen.    Review of systems  Apart from describing in HPI, the remainder of comprehensive ROS was negative.    Past History    Past Medical History:   Diagnosis Date     Diabetes (H)        Past Surgical History:   Procedure Laterality Date     ESOPHAGOSCOPY, GASTROSCOPY, DUODENOSCOPY (EGD), COMBINED N/A 5/24/2022    Procedure: ESOPHAGOGASTRODUODENOSCOPY (EGD) with esophageal banding;  Surgeon: Gary Hurst MD;  Location: [facility] Main OR     ESOPHAGOSCOPY, GASTROSCOPY, DUODENOSCOPY (EGD), COMBINED N/A 6/20/2022    Procedure: ESOPHAGOGASTRODUODENOSCOPY;  Surgeon: Gavino Reza MD;  Location: [facility] Main OR     MIDLINE DOUBLE LUMEN PLACEMENT  5/26/2022            Physical Exam    BP (!) 141/64 (BP Location: Left arm, Patient Position: Sitting, Cuff Size: Adult Regular)   Pulse 100   Ht 1.651 m (5' 5\")   Wt 67.5 kg (148 lb 14.4 oz)   SpO2 99%   BMI 24.78 kg/m      General: alert, awake, not in acute distress  HEENT: Head: Normal, normocephalic, atraumatic.  Eye: Normal external eye, conjunctiva, lids cornea, JUSTYNA.  Pharynx: Normal buccal mucosa. Normal pharynx.  Neck / Thyroid: Supple, no masses, nodes, nodules or enlargement.  Lymphatics: No abnormally enlarged lymph nodes.  Chest: Normal chest wall and respirations. Clear to auscultation.  Heart: S1 S2 RRR.   Abdomen: abdomen is soft without significant tenderness, masses, organomegaly or guarding  Extremities: normal strength, tone, and muscle mass  Skin: normal. no rash or abnormalities  CNS: non focal.      Lab Results    Recent Results (from the past 168 hour(s))   Adult Type and Screen   Result Value Ref Range    ABO/RH(D) A NEG     Antibody Screen Negative Negative    SPECIMEN EXPIRATION DATE [id number]    CBC with platelets and differential   Result Value " Ref Range    WBC Count 8.9 4.0 - 11.0 10e3/uL    RBC Count 2.05 (L) 4.40 - 5.90 10e6/uL    Hemoglobin 6.8 (LL) 13.3 - 17.7 g/dL    Hematocrit 20.0 (L) 40.0 - 53.0 %    MCV 98 78 - 100 fL    MCH 33.2 (H) 26.5 - 33.0 pg    MCHC 34.0 31.5 - 36.5 g/dL    RDW 19.5 (H) 10.0 - 15.0 %    Platelet Count 87 (L) 150 - 450 10e3/uL    % Neutrophils 63 %    % Lymphocytes 25 %    % Monocytes 6 %    % Eosinophils 3 %    % Basophils 2 %    % Immature Granulocytes 1 %    NRBCs per 100 WBC 0 <1 /100    Absolute Neutrophils 5.6 1.6 - 8.3 10e3/uL    Absolute Lymphocytes 2.2 0.8 - 5.3 10e3/uL    Absolute Monocytes 0.6 0.0 - 1.3 10e3/uL    Absolute Eosinophils 0.3 0.0 - 0.7 10e3/uL    Absolute Basophils 0.2 0.0 - 0.2 10e3/uL    Absolute Immature Granulocytes 0.1 <=0.4 10e3/uL    Absolute NRBCs 0.0 10e3/uL   Prepare red blood cells (unit)   Result Value Ref Range    CROSSMATCH Compatible     UNIT ABO/RH A Neg     Unit Number [id number]     Unit Status Transfused     Blood Component Type Red Blood Cells     Product Code G9076I62     CODING SYSTEM [id number]     UNIT TYPE ISBT 0600     ISSUE DATE AND TIME [id number]    Reticulocyte count   Result Value Ref Range    % Reticulocyte 7.8 (H) 0.8 - 2.7 %    Absolute Reticulocyte 0.171 (H) 0.010 - 0.110 10e6/uL   CBC with platelets   Result Value Ref Range    WBC Count 7.9 4.0 - 11.0 10e3/uL    RBC Count 2.18 (L) 4.40 - 5.90 10e6/uL    Hemoglobin 7.3 (L) 13.3 - 17.7 g/dL    Hematocrit 21.3 (L) 40.0 - 53.0 %    MCV 98 78 - 100 fL    MCH 33.5 (H) 26.5 - 33.0 pg    MCHC 34.3 31.5 - 36.5 g/dL    RDW 18.5 (H) 10.0 - 15.0 %    Platelet Count 98 (L) 150 - 450 10e3/uL   Extra Red Top Tube   Result Value Ref Range    Hold Specimen [facility]    Extra Blood Bank Purple Top Tube   Result Value Ref Range    Hold Specimen [facility]    Comprehensive metabolic panel   Result Value Ref Range    Sodium 136 136 - 145 mmol/L    Potassium 4.6 3.5 - 5.0 mmol/L    Chloride 97 (L) 98 - 107 mmol/L    Carbon Dioxide (CO2) 30 22 -  31 mmol/L    Anion Gap 9 5 - 18 mmol/L    Urea Nitrogen 37 (H) 8 - 22 mg/dL    Creatinine 0.86 0.70 - 1.30 mg/dL    Calcium 9.1 8.5 - 10.5 mg/dL    Glucose 219 (H) 70 - 125 mg/dL    Alkaline Phosphatase 195 (H) 45 - 120 U/L     (H) 0 - 40 U/L    ALT 38 0 - 45 U/L    Protein Total 7.3 6.0 - 8.0 g/dL    Albumin 2.9 (L) 3.5 - 5.0 g/dL    Bilirubin Total 13.9 (H) 0.0 - 1.0 mg/dL    GFR Estimate >90 >60 mL/min/1.73m2   Magnesium   Result Value Ref Range    Magnesium 1.8 1.8 - 2.6 mg/dL   Extra Green Top (Lithium Heparin) Tube   Result Value Ref Range    Hold Specimen [facility]    Extra Purple Top Tube   Result Value Ref Range    Hold Specimen     CBC with platelets and differential   Result Value Ref Range    WBC Count 9.3 4.0 - 11.0 10e3/uL    RBC Count 1.82 (L) 4.40 - 5.90 10e6/uL    Hemoglobin 6.3 (LL) 13.3 - 17.7 g/dL    Hematocrit 17.9 (L) 40.0 - 53.0 %    MCV 98 78 - 100 fL    MCH 34.6 (H) 26.5 - 33.0 pg    MCHC 35.2 31.5 - 36.5 g/dL    RDW 18.5 (H) 10.0 - 15.0 %    Platelet Count 125 (L) 150 - 450 10e3/uL   Adult Type and Screen   Result Value Ref Range    ABO/RH(D) A NEG     Antibody Screen Negative Negative    SPECIMEN EXPIRATION DATE [id number]    Manual Differential   Result Value Ref Range    % Neutrophils 70 %    % Lymphocytes 19 %    % Monocytes 5 %    % Eosinophils 4 %    % Basophils 2 %    Absolute Neutrophils 6.5 1.6 - 8.3 10e3/uL    Absolute Lymphocytes 1.8 0.8 - 5.3 10e3/uL    Absolute Monocytes 0.5 0.0 - 1.3 10e3/uL    Absolute Eosinophils 0.4 0.0 - 0.7 10e3/uL    Absolute Basophils 0.2 0.0 - 0.2 10e3/uL    RBC Morphology Confirmed RBC Indices     Platelet Assessment  Automated Count Confirmed. Platelet morphology is normal.     Automated Count Confirmed. Platelet morphology is normal.    [place] Cells Slight (A) None Seen    Polychromasia Slight (A) None Seen   INR   Result Value Ref Range    INR 1.63 (H) 0.85 - 1.15   Prepare red blood cells (unit)   Result Value Ref Range    CROSSMATCH Compatible      UNIT ABO/RH A Neg     Unit Number [id number]     Unit Status Transfused     Blood Component Type Red Blood Cells     Product Code [id number]     CODING SYSTEM [id number]     UNIT TYPE ISBT 0600     ISSUE DATE AND TIME [id number]    Basic metabolic panel   Result Value Ref Range    Sodium 134 (L) 136 - 145 mmol/L    Potassium 4.8 3.5 - 5.0 mmol/L    Chloride 96 (L) 98 - 107 mmol/L    Carbon Dioxide (CO2) 28 22 - 31 mmol/L    Anion Gap 10 5 - 18 mmol/L    Urea Nitrogen 34 (H) 8 - 22 mg/dL    Creatinine 0.73 0.70 - 1.30 mg/dL    Calcium 9.2 8.5 - 10.5 mg/dL    Glucose 185 (H) 70 - 125 mg/dL    GFR Estimate >90 >60 mL/min/1.73m2       Imaging    US Abdomen Limited    Result Date: 6/18/2022  EXAM: US ABDOMEN LIMITED LOCATION: [facility] DATE/TIME: 6/18/2022 6:07 PM INDICATION:. Alcoholic cirrhosis. Status change. assess for eough ascites for paracentesis COMPARISON: 5/31/2022 TECHNIQUE: Limited abdominal ultrasound. FINDINGS: Limited exam performed targeting the entire peritoneal cavity evaluating for ascites volume. No ascites identified. No paracentesis performed.     IMPRESSION: 1.  No ascites identified.     XR Chest Port 1 View    Result Date: 6/18/2022  EXAM: XR CHEST PORT 1 VIEW LOCATION: [facility] DATE/TIME: 6/18/2022 6:59 PM INDICATION: Altered mental status, fatigue, vomiting COMPARISON: 5/23/2022     IMPRESSION: Patchy right infrahilar opacities favored to represent atelectasis although developing infection is in the differential. Left lung is clear. No effusions. Heart size is normal.    40 minutes spent on the date of the encounter doing chart review, history and exam, documentation and further activities as noted above.    Signed by: Tez Riojas MD

## 2022-07-09 ENCOUNTER — HOSPITAL ENCOUNTER (EMERGENCY)
Facility: CLINIC | Age: 27
Discharge: HOME | End: 2022-07-09
Attending: EMERGENCY MEDICINE | Admitting: EMERGENCY MEDICINE
Payer: COMMERCIAL

## 2022-07-09 VITALS
TEMPERATURE: 98 F | OXYGEN SATURATION: 100 % | WEIGHT: 145 LBS | HEART RATE: 101 BPM | RESPIRATION RATE: 20 BRPM | DIASTOLIC BLOOD PRESSURE: 78 MMHG | SYSTOLIC BLOOD PRESSURE: 137 MMHG | BODY MASS INDEX: 24.13 KG/M2

## 2022-07-09 DIAGNOSIS — D64.9 ANEMIA, UNSPECIFIED TYPE: ICD-10-CM

## 2022-07-09 LAB
ABO/RH(D): NORMAL
ANION GAP SERPL CALCULATED.3IONS-SCNC: 10 MMOL/L (ref 5–18)
ANTIBODY SCREEN: NEGATIVE
BASOPHILS # BLD MANUAL: 0.2 10E3/UL (ref 0–0.2)
BASOPHILS NFR BLD MANUAL: 2 %
BLD PROD TYP BPU: NORMAL
BLOOD COMPONENT TYPE: NORMAL
BUN SERPL-MCNC: 34 MG/DL (ref 8–22)
BURR CELLS BLD QL SMEAR: SLIGHT
CALCIUM SERPL-MCNC: 9.2 MG/DL (ref 8.5–10.5)
CHLORIDE BLD-SCNC: 96 MMOL/L (ref 98–107)
CO2 SERPL-SCNC: 28 MMOL/L (ref 22–31)
CODING SYSTEM: NORMAL
CREAT SERPL-MCNC: 0.73 MG/DL (ref 0.7–1.3)
CROSSMATCH: NORMAL
EOSINOPHIL # BLD MANUAL: 0.4 10E3/UL (ref 0–0.7)
EOSINOPHIL NFR BLD MANUAL: 4 %
ERYTHROCYTE [DISTWIDTH] IN BLOOD BY AUTOMATED COUNT: 18.5 % (ref 10–15)
GFR SERPL CREATININE-BSD FRML MDRD: >90 ML/MIN/1.73M2
GLUCOSE BLD-MCNC: 185 MG/DL (ref 70–125)
HCT VFR BLD AUTO: 17.9 % (ref 40–53)
HGB BLD-MCNC: 6.3 G/DL (ref 13.3–17.7)
HOLD SPECIMEN: NORMAL
HOLD SPECIMEN: NORMAL
INR PPP: 1.63 (ref 0.85–1.15)
ISSUE DATE AND TIME: NORMAL
LYMPHOCYTES # BLD MANUAL: 1.8 10E3/UL (ref 0.8–5.3)
LYMPHOCYTES NFR BLD MANUAL: 19 %
MCH RBC QN AUTO: 34.6 PG (ref 26.5–33)
MCHC RBC AUTO-ENTMCNC: 35.2 G/DL (ref 31.5–36.5)
MCV RBC AUTO: 98 FL (ref 78–100)
MONOCYTES # BLD MANUAL: 0.5 10E3/UL (ref 0–1.3)
MONOCYTES NFR BLD MANUAL: 5 %
NEUTROPHILS # BLD MANUAL: 6.5 10E3/UL (ref 1.6–8.3)
NEUTROPHILS NFR BLD MANUAL: 70 %
PLAT MORPH BLD: ABNORMAL
PLATELET # BLD AUTO: 125 10E3/UL (ref 150–450)
POLYCHROMASIA BLD QL SMEAR: SLIGHT
POTASSIUM BLD-SCNC: 4.8 MMOL/L (ref 3.5–5)
RBC # BLD AUTO: 1.82 10E6/UL (ref 4.4–5.9)
RBC MORPH BLD: ABNORMAL
SODIUM SERPL-SCNC: 134 MMOL/L (ref 136–145)
SPECIMEN EXPIRATION DATE: NORMAL
UNIT ABO/RH: NORMAL
UNIT NUMBER: NORMAL
UNIT STATUS: NORMAL
UNIT TYPE ISBT: 600
WBC # BLD AUTO: 9.3 10E3/UL (ref 4–11)

## 2022-07-09 PROCEDURE — 99285 EMERGENCY DEPT VISIT HI MDM: CPT | Mod: 25

## 2022-07-09 PROCEDURE — 86901 BLOOD TYPING SEROLOGIC RH(D): CPT | Performed by: EMERGENCY MEDICINE

## 2022-07-09 PROCEDURE — 85007 BL SMEAR W/DIFF WBC COUNT: CPT | Performed by: EMERGENCY MEDICINE

## 2022-07-09 PROCEDURE — 36415 COLL VENOUS BLD VENIPUNCTURE: CPT | Mod: 91 | Performed by: EMERGENCY MEDICINE

## 2022-07-09 PROCEDURE — 36430 TRANSFUSION BLD/BLD COMPNT: CPT

## 2022-07-09 PROCEDURE — P9016 RBC LEUKOCYTES REDUCED: HCPCS | Performed by: EMERGENCY MEDICINE

## 2022-07-09 PROCEDURE — 85027 COMPLETE CBC AUTOMATED: CPT | Performed by: EMERGENCY MEDICINE

## 2022-07-09 PROCEDURE — 86923 COMPATIBILITY TEST ELECTRIC: CPT | Performed by: EMERGENCY MEDICINE

## 2022-07-09 PROCEDURE — 80048 BASIC METABOLIC PNL TOTAL CA: CPT | Performed by: EMERGENCY MEDICINE

## 2022-07-09 PROCEDURE — 85610 PROTHROMBIN TIME: CPT | Performed by: EMERGENCY MEDICINE

## 2022-07-09 ASSESSMENT — ENCOUNTER SYMPTOMS
BLOOD IN STOOL: 0
SHORTNESS OF BREATH: 0

## 2022-07-09 NOTE — ED NOTES
Blood transfusion completed. States feels fine and would like to go home. Clear bilateral breath sounds

## 2022-07-09 NOTE — ED PROVIDER NOTES
Emergency Department Encounter      NAME: Dillon Salter  AGE: 26 year old male  YOB: 1995  MRN: [id number]  EVALUATION DATE & TIME: 7/9/2022  2:57 PM    PCP: Gary Rodrigues    ED PROVIDER: Jarred Reyes M.D.      Chief Complaint   Patient presents with     Abnormal Labs         FINAL IMPRESSION:  1. Anemia, unspecified type        MEDICAL DECISION MAKING:    3:07 PM I met with the patient, obtained history, performed an initial exam, and discussed options and plan for diagnostics and treatment here in the ED.     This patient is a 26-year-old male with a history of alcoholic cirrhosis with ascites who presents with anemia.  The patient has recently been receiving 2 transfusions a week for hemoglobins of less than 7.  He saw his hematologist Dr. Riojas on Thursday and his hemoglobin was 7.3 but it dropped to below 7 yesterday.  Today in the ER was 6.3.  The only bleeding that he has is occasional bleeding from his gums but he denies melena or bright red blood per rectum.  He does not have any other particular symptoms from his anemia.  I have ordered a unit of packed red blood cells for him and he should be able to be discharged afterward.  I have signed the patient out to Dr. Benton pending the completion of his transfusion.    Pertinent Labs & Imaging studies reviewed. (See chart for details)    The importance of close follow up was discussed. We reviewed warning signs and symptoms, and I instructed Mr. Salter to return to the emergency department immediately if he develops any new or worsening symptoms. I provided additional verbal discharge instructions. Mr. Salter expressed understanding and agreement with this plan of care, his questions were answered, and he was discharged in stable condition.       MEDICATIONS GIVEN IN THE EMERGENCY:  Medications - No data to display    NEW PRESCRIPTIONS STARTED AT TODAY'S ER VISIT:  New Prescriptions    No medications on file           =================================================================    HPI    Patient information was obtained from: Patient     Use of : N/A        Dillon Salter is a 26 year old male with a pertinent medical history of anemia, type II diabetes, and alcoholic cirrhosis of liver with ascites who presents to this ED by walk in accompanied by mother for evaluation of abnormal labs.    Patient reports low hemoglobin levels from intermittent episodes of bleeding gums, specifically 6.6 on Friday found during a physical with primary provider. He usually sees Dr. Riojas and has two infusions of PRBC's weekly, however hemoglobin on Thursday was found to be 7.3, so blood transfusion was deferred. Patient is currently 93 days sober. Patient denies black or blood stools, nosebleeds, shortness of breath, or additional symptoms or complaints at this time.           REVIEW OF SYSTEMS   Review of Systems   HENT: Positive for dental problem (intermittent episodes of bleeding gums). Negative for nosebleeds.    Respiratory: Negative for shortness of breath.    Gastrointestinal: Negative for blood in stool.   All other systems reviewed and are negative.       PAST MEDICAL HISTORY:  Past Medical History:   Diagnosis Date     Diabetes (H)        PAST SURGICAL HISTORY:  Past Surgical History:   Procedure Laterality Date     ESOPHAGOSCOPY, GASTROSCOPY, DUODENOSCOPY (EGD), COMBINED N/A 5/24/2022    Procedure: ESOPHAGOGASTRODUODENOSCOPY (EGD) with esophageal banding;  Surgeon: Gary Hurst MD;  Location: [facility] OR     ESOPHAGOSCOPY, GASTROSCOPY, DUODENOSCOPY (EGD), COMBINED N/A 6/20/2022    Procedure: ESOPHAGOGASTRODUODENOSCOPY;  Surgeon: Gavino Reza MD;  Location: [facility] OR     MIDLINE DOUBLE LUMEN PLACEMENT  5/26/2022            CURRENT MEDICATIONS:    No current facility-administered medications for this encounter.    Current Outpatient Medications:      alcohol swab prep pads, Use to  swab area of injection/marsha as directed., Disp: 100 each, Rfl: 3     blood glucose (NO BRAND SPECIFIED) test strip, Use to test blood sugar 4 times daily or as directed. To accompany: Blood Glucose Monitor Brands: per insurance., Disp: 100 strip, Rfl: 6     blood glucose monitoring (NO BRAND SPECIFIED) meter device kit, Use to test blood sugar 4 times daily or as directed. Preferred blood glucose meter OR supplies to accompany: Blood Glucose Monitor Brands: per insurance., Disp: 1 kit, Rfl: 0     FLUoxetine (PROZAC) 40 MG capsule, Take 40 mg by mouth At Bedtime, Disp: , Rfl:      folic acid (FOLVITE) 1 MG tablet, Take 1 tablet (1 mg) by mouth daily, Disp: 90 tablet, Rfl: 1     furosemide (LASIX) 40 MG tablet, Take 1 tablet (40 mg) by mouth 2 times daily, Disp: 30 tablet, Rfl: 3     gabapentin (NEURONTIN) 100 MG capsule, , Disp: , Rfl:      insulin aspart (NOVOLOG FLEXPEN) 100 UNIT/ML pen, 5 units tid with meals and 1 unit per 15 unit of carbohydrate counting (Patient not taking: No sig reported), Disp: 15 mL, Rfl: 3     insulin glargine (LANTUS SOLOSTAR) 100 UNIT/ML pen, Inject 45 Units Subcutaneous At Bedtime, Disp: 3 mL, Rfl: 0     insulin pen needle (ULTICARE MICRO) 32G X 4 MM miscellaneous, Use pen needles daily or as directed., Disp: 100 each, Rfl: 3     lactulose (CHRONULAC) 10 GM/15ML solution, Take 30 mLs (20 g) by mouth 4 times daily (Patient taking differently: Take 20 g by mouth 3 times daily), Disp: 1892 mL, Rfl: 1     magnesium oxide (MAG-OX) 400 MG tablet, Take 1 tablet (400 mg) by mouth daily, Disp: 60 tablet, Rfl: 0     multivitamin, therapeutic (THERA-VIT) TABS tablet, Take 1 tablet by mouth in the morning., Disp: , Rfl:      pantoprazole (PROTONIX) 40 MG EC tablet, Take 1 tablet (40 mg) by mouth 2 times daily, Disp: 30 tablet, Rfl: 0     predniSONE (DELTASONE) 10 MG tablet, Take 1 tablet (10 mg) by mouth daily, Disp: 30 tablet, Rfl: 0     rifaximin (XIFAXAN) 550 MG TABS tablet, Take 1 tablet (550  mg) by mouth 2 times daily (Patient not taking: Reported on 7/7/2022), Disp: 60 tablet, Rfl: 0     spironolactone (ALDACTONE) 25 MG tablet, Take 1 tablet (25 mg) by mouth 3 times daily, Disp: 180 tablet, Rfl: 1     thiamine (B-1) 100 MG tablet, Take 1 tablet (100 mg) by mouth in the morning., Disp: 30 tablet, Rfl: 0     thin (NO BRAND SPECIFIED) lancets, Use with lanceting device. To accompany: Blood Glucose Monitor Brands: per insurance., Disp: 100 each, Rfl: 6    ALLERGIES:  No Known Allergies    FAMILY HISTORY:  History reviewed. No pertinent family history.    SOCIAL HISTORY:   Social History     Socioeconomic History     Marital status: Single   Tobacco Use     Smoking status: Former Smoker     Smokeless tobacco: Former User   Vaping Use     Vaping Use: Former   Substance and Sexual Activity     Alcohol use: Not Currently     Drug use: Not Currently     Types: Marijuana   Social History Narrative    26-year-old history of autism/Asperger's on the spectrum graduated high school at [employer] worked at [employer] and then another  place until the Covid epidemic in 2020 lives with parents (Leticia and Wes) socially isolated drinks alcohol beer daily        End-stage cirrhosis noted on admission to  April 2022       PHYSICAL EXAM:    Vitals: /60   Pulse 104   Temp 98.3  F (36.8  C)   Resp 16   Wt 65.8 kg (145 lb)   SpO2 99%   BMI 24.13 kg/m     Constitutional: Well developed, well nourished. Comfortable appearing. Jaundice   HEAD:Normocephalic, atraumatic,   Eyes: PERRLA,  no discharge. Scalaris ictus present  ENT: mucous membranes moist, nose normal.   Neck- Supple, gross ROM intact.  No JVD.  No palpable nodes.  Pulmonary: Clear to auscultation bilaterally, no respiratory distress, no wheezing, speaks full sentences easily.  Chest: No chest wall tenderness  Cardiovascular: Normal heart rate, regular rhythm, no murmurs. No lower extremity edema  GI: Soft, no tenderness to deep palpation in all  quadrants, no masses.  No hepatosplenomegaly. Slightly distended but nontender  Musculoskeletal: Moving all 4 extremities intentionally and without pain. No obvious deformity.  Skin: Warm, dry, no rash. Jaundice   Neurologic: Alert & oriented x 3, speech clear, moving all extremities spontaneously   Psychiatric: Affect normal, cooperative.     LAB:  All pertinent labs reviewed and interpreted.  Labs Ordered and Resulted from Time of ED Arrival to Time of ED Departure   INR - Abnormal       Result Value    INR 1.63 (*)    BASIC METABOLIC PANEL - Abnormal    Sodium 134 (*)     Potassium 4.8      Chloride 96 (*)     Carbon Dioxide (CO2) 28      Anion Gap 10      Urea Nitrogen 34 (*)     Creatinine 0.73      Calcium 9.2      Glucose 185 (*)     GFR Estimate >90     CBC WITH PLATELETS AND DIFFERENTIAL - Abnormal    WBC Count 9.3      RBC Count 1.82 (*)     Hemoglobin 6.3 (*)     Hematocrit 17.9 (*)     MCV 98      MCH 34.6 (*)     MCHC 35.2      RDW 18.5 (*)     Platelet Count 125 (*)    DIFFERENTIAL - Abnormal    % Neutrophils 70      % Lymphocytes 19      % Monocytes 5      % Eosinophils 4      % Basophils 2      Absolute Neutrophils 6.5      Absolute Lymphocytes 1.8      Absolute Monocytes 0.5      Absolute Eosinophils 0.4      Absolute Basophils 0.2      RBC Morphology Confirmed RBC Indices      Platelet Assessment        Value: Automated Count Confirmed. Platelet morphology is normal.    [place] Cells Slight (*)     Polychromasia Slight (*)    TYPE AND SCREEN, ADULT    ABO/RH(D) A NEG      Antibody Screen Negative      SPECIMEN EXPIRATION DATE 20220712235900     PREPARE RED BLOOD CELLS (UNIT)    CROSSMATCH Compatible      UNIT ABO/RH A Neg      Unit Number [id number]      Unit Status Ready      Blood Component Type Red Blood Cells      Product Code U8816G92      CODING SYSTEM [id number]      UNIT TYPE ISBT 0600     PREPARE RED BLOOD CELLS (UNIT)   ABO/RH TYPE AND SCREEN         Antonio WOOD, am serving as a scribe to  document services personally performed by Dr. Jarred Reyes based on my observation and the provider's statements to me. I, Jarred Reyes M.D. attest that Antonio Barbosa is acting in a scribe capacity, has observed my performance of the services and has documented them in accordance with my direction.      Jarred Reyes M.D.  Emergency Medicine  [facility] EMERGENCY ROOM  [address] [id number]  [phone]  Dept: [phone]     Jarred Reyes MD  07/09/22 [id number]

## 2022-07-09 NOTE — ED TRIAGE NOTES
Pt reports he is here due to low hemoglobin.  Pt sees Dr. Riojas and has twice weekly infusions of PRBC's.  On Thursday hemoglobin was 7.3 so he was told he did not need a blood transfusion.  Had a physical with his primary on Friday and was told his hemoglobin was 6.6 and to come to the ER.

## 2022-07-11 ENCOUNTER — LAB (OUTPATIENT)
Dept: INFUSION THERAPY | Facility: CLINIC | Age: 27
End: 2022-07-11
Attending: INTERNAL MEDICINE
Payer: COMMERCIAL

## 2022-07-11 ENCOUNTER — HOSPITAL ENCOUNTER (OUTPATIENT)
Dept: BEHAVIORAL HEALTH | Facility: CLINIC | Age: 27
Discharge: HOME | End: 2022-07-11
Attending: FAMILY MEDICINE | Admitting: FAMILY MEDICINE
Payer: COMMERCIAL

## 2022-07-11 VITALS — BODY MASS INDEX: 21.66 KG/M2 | WEIGHT: 130 LBS | HEIGHT: 65 IN

## 2022-07-11 DIAGNOSIS — D62 ACUTE POSTHEMORRHAGIC ANEMIA: ICD-10-CM

## 2022-07-11 DIAGNOSIS — F10.20 ALCOHOL USE DISORDER, SEVERE, DEPENDENCE (H): ICD-10-CM

## 2022-07-11 LAB
ERYTHROCYTE [DISTWIDTH] IN BLOOD BY AUTOMATED COUNT: 19.5 % (ref 10–15)
HCT VFR BLD AUTO: 22 % (ref 40–53)
HGB BLD-MCNC: 7.6 G/DL (ref 13.3–17.7)
HOLD SPECIMEN: NORMAL
MCH RBC QN AUTO: 33.5 PG (ref 26.5–33)
MCHC RBC AUTO-ENTMCNC: 34.5 G/DL (ref 31.5–36.5)
MCV RBC AUTO: 97 FL (ref 78–100)
PLATELET # BLD AUTO: 127 10E3/UL (ref 150–450)
RBC # BLD AUTO: 2.27 10E6/UL (ref 4.4–5.9)
WBC # BLD AUTO: 11.2 10E3/UL (ref 4–11)

## 2022-07-11 PROCEDURE — 36415 COLL VENOUS BLD VENIPUNCTURE: CPT

## 2022-07-11 PROCEDURE — 85027 COMPLETE CBC AUTOMATED: CPT

## 2022-07-11 PROCEDURE — H0001 ALCOHOL AND/OR DRUG ASSESS: HCPCS | Mod: TEL,95

## 2022-07-11 ASSESSMENT — ANXIETY QUESTIONNAIRES
7. FEELING AFRAID AS IF SOMETHING AWFUL MIGHT HAPPEN: NOT AT ALL
2. NOT BEING ABLE TO STOP OR CONTROL WORRYING: SEVERAL DAYS
4. TROUBLE RELAXING: NOT AT ALL
GAD7 TOTAL SCORE: 5
IF YOU CHECKED OFF ANY PROBLEMS ON THIS QUESTIONNAIRE, HOW DIFFICULT HAVE THESE PROBLEMS MADE IT FOR YOU TO DO YOUR WORK, TAKE CARE OF THINGS AT HOME, OR GET ALONG WITH OTHER PEOPLE: NOT DIFFICULT AT ALL
6. BECOMING EASILY ANNOYED OR IRRITABLE: NOT AT ALL
GAD7 TOTAL SCORE: 5
3. WORRYING TOO MUCH ABOUT DIFFERENT THINGS: NEARLY EVERY DAY
5. BEING SO RESTLESS THAT IT IS HARD TO SIT STILL: NOT AT ALL
1. FEELING NERVOUS, ANXIOUS, OR ON EDGE: SEVERAL DAYS

## 2022-07-11 ASSESSMENT — COLUMBIA-SUICIDE SEVERITY RATING SCALE - C-SSRS
6. HAVE YOU EVER DONE ANYTHING, STARTED TO DO ANYTHING, OR PREPARED TO DO ANYTHING TO END YOUR LIFE?: NO
2. HAVE YOU ACTUALLY HAD ANY THOUGHTS OF KILLING YOURSELF IN THE PAST MONTH?: NO
4. HAVE YOU HAD THESE THOUGHTS AND HAD SOME INTENTION OF ACTING ON THEM?: NO
3. HAVE YOU BEEN THINKING ABOUT HOW YOU MIGHT KILL YOURSELF?: NO
5. HAVE YOU STARTED TO WORK OUT OR WORKED OUT THE DETAILS OF HOW TO KILL YOURSELF? DO YOU INTEND TO CARRY OUT THIS PLAN?: NO
1. IN THE PAST MONTH, HAVE YOU WISHED YOU WERE DEAD OR WISHED YOU COULD GO TO SLEEP AND NOT WAKE UP?: NO

## 2022-07-11 ASSESSMENT — PATIENT HEALTH QUESTIONNAIRE - PHQ9: SUM OF ALL RESPONSES TO PHQ QUESTIONS 1-9: 9

## 2022-07-11 ASSESSMENT — PAIN SCALES - GENERAL: PAINLEVEL: NO PAIN (0)

## 2022-07-11 NOTE — PROGRESS NOTES
"    [facility]  Provider Name: Kianna Bermudezenter  Credentials:  [facility]    PATIENT'S NAME: Dillon Salter  PREFERRED NAME: Dillon  PRONOUNS:he/him/his  MRN: [id number]  : 1995  ADDRESS: [address] [place], [place] [id number]  ACCT. NUMBER:  [id number]  DATE OF SERVICE: 22  START TIME: 1:00 pm  END TIME: 2:40 pm  PREFERRED PHONE: [phone]  May we leave a program related message: Yes  SERVICE MODALITY:  Phone Visit:      Provider verified identity through the following two step process.  Patient provided:  Patient  and Patient's last 4 digits of SSN    The patient has been notified of the following:      \"We have found that certain health care needs can be provided without the need for a face to face visit.  This service lets us provide the care you need with a phone conversation.       I will have full access to your [facility] medical record during this entire phone call.   I will be taking notes for your medical record.      Since this is like an office visit, we will bill your insurance company for this service.       There are potential benefits and risks of telephone visits (e.g. limits to patient confidentiality) that differ from in-person visits.?Confidentiality still applies for telephone services, and nobody will record the visit.  It is important to be in a quiet, private space that is free of distractions (including cell phone or other devices) during the visit.??      If during the course of the call I believe a telephone visit is not appropriate, you will not be charged for this service\"     Consent has been obtained for this service by care team member: Yes       Himself, Tel: [phone], Email:     His Emergency and Collateral Contact, Leticia Salter (Mother), Tel: [phone]    UNIVERSAL ADULT Substance Use Disorder DIAGNOSTIC ASSESSMENT    Identifying Information:  Patient is a 26 year old,  individual.    Patient " "was referred for an assessment by Ashkan Singleton at [facility]. Patient attended the session alone and partially with his mother.    Chief Complaint:    The reason for seeking services at this time is: \" I am 90 days sober all by myself.  I was an alcoholic from 2014 until 2018 and  I went to a rehab in [place] and had 42 days sober.  From July/August 2018-2022, it was on and off hell and only became worse.  April 7th of 2022 until now I have had no alcohol.\"   The problem(s) began at the age of 19. Patient has attempted to resolve these concerns in the past through \"inpatient tx in [place] in 2018 and I completed.\"  Because I have Asperger's/Autism I have severe separation anxiety from family and in particular with my mother.  I will never do inpatient again because of that.\"Patient does not appear to be in severe withdrawal, an imminent safety risk to self or others, or requiring immediate medical attention and may proceed with the assessment interview.    Social/Family History:  Patient reported they grew up in [place].  They were raised by his mother and father.  \"I am an only child\".  Patient reported that their childhood was \"very privileged\"   Patient describes current relationships with family of origin as \"very good\".      The patient describes their cultural background as Serbian/Mongolian.  Cultural influences and impact on patient's life structure, values, norms, and healthcare: NA.  Contextual influences on patient's health include: Individual Factors The pt reports DX of Aspergers/Autism and Depression.  He reports extremely heavy alcohol consumption since the age of 19 and he reprots he has Cirrhosis of the Liver. and Family Factors The pt reports he is an only child and is very close to his parents whom he lives with..  Patient identified their preferred language to be English. Patient reported they do not need the assistance of an  or other support involved in therapy.  Patient " "reports they are not involved in community of livier activities.  They reports spirituality impacts recovery in the following ways:  \"It makes an impact and has gotten stronger and deeper.\".     Patient reported experienced significant delays in developmental tasks, such as \"had an IEP as a child.\".   Patient's highest education level was high school graduate. Patient identified the following learning problems: attention and concentration.  Patient reports they are  able to understand written materials.    Patient reported the following relationship history single.  Patient's current relationship status is single for most of his his adult life.  Patient identified their sexual orientation as heterosexual.  Patient reported having no child(rosa).     Patient's current living/housing situation involves staying with his parents..  They live with his parents and one dog and they report that housing is stable. Patient identified parents and friends as part of their support system.  Patient identified the quality of these relationships as stable and meaningful.      Patient reports engaging in the following recreational/leisure activities:\" listen to music, read a book, do some archiving, playing video games.\" Patient reports engaging in the following recreation/leisure activities while using: \"the same as when not drinking.\". Patient reports the following people are supportive of recovery: his parents and friends.  Patient is currently unemployed, but was employed up until age 25.  Patient reports their income is obtained through parents and SSDI disability.  Patient does not identify finances as a current stressor.  Cultural and socioeconomic factors do not affect the patient's access to services.      Patient denies substance related arrests or legal issues.  Patient denies being on probation / parole / under the jurisdiction of the court.    Patient's Strengths and Limitations:  Patient identified the following " strengths or resources that will help them succeed in treatment: livier / spirituality, friends / good social support, family support, intelligence, motivation, sense of humor, sober support group / recovery support , sponsor and strong social skills. Things that may interfere with the patient's success in treatment include: physical health concerns.     Assessments:  The following assessments were completed by patient for this visit:  PHQ2:   PHQ-2 ( 1999 [employer]) 5/5/2022   Q1: Little interest or pleasure in doing things 3   Q2: Feeling down, depressed or hopeless 3   PHQ-2 Score 6     PHQ9:   PHQ-9 SCORE 5/5/2022 7/11/2022   PHQ-9 Total Score 14 9     GAD2: No flowsheet data found.  GAD7:   WILDA-7 SCORE 7/11/2022   Total Score 5     PROMIS 10-Global Health (all questions and answers displayed):   PROMIS 10 7/11/2022   In general, would you say your health is: 3   In general, would you say your quality of life is: 3   In general, how would you rate your physical health? 1   In general, how would you rate your mental health, including your mood and your ability to think? 4   In general, how would you rate your satisfaction with your social activities and relationships? 4   In general, please rate how well you carry out your usual social activities and roles. (This includes activities at home, at work and in your community, and responsibilities as a parent, child, spouse, employee, friend, etc.) 2   To what extent are you able to carry out your everyday physical activities such as walking, climbing stairs, carrying groceries, or moving a chair? 3   In the past 7 days, how often have you been bothered by emotional problems such as feeling anxious, depressed, or irritable? 2   In the past 7 days, how would you rate your fatigue on average? 4   In the past 7 days, how would you rate your pain on average, where 0 means no pain, and 10 means worst imaginable pain? 8   Global Mental Health Score 15   Global Physical Health  Score 8   PROMIS TOTAL - SUBSCORES 23   Some recent data might be hidden     [place] Suicide Severity Rating Scale (Lifetime/Recent)  [place] Suicide Severity Rating (Lifetime/Recent) 7/11/2022   Q1 Wished to be Dead (Past Month) no   Q2 Suicidal Thoughts (Past Month) no   Q3 Suicidal Thought Method no   Q4 Suicidal Intent without Specific Plan no   Q5 Suicide Intent with Specific Plan no   Q6 Suicide Behavior (Lifetime) no   Level of Risk per Screen low risk     [place] Suicide Severity Rating Scale (Short Version)  [place] Suicide Severity Rating (Short Version) 4/6/2022 5/23/2022 5/24/2022 6/14/2022 6/18/2022 7/9/2022 7/11/2022   Over the past 2 weeks have you felt down, depressed, or hopeless? yes no no no no no -   Over the past 2 weeks have you had thoughts of killing yourself? no no no no no no -   Have you ever attempted to kill yourself? no no no no no no -   Q1 Wished to be Dead (Past Month) - - - - - - no   Q2 Suicidal Thoughts (Past Month) - - - - - - no   Q3 Suicidal Thought Method - - - - - - no   Q4 Suicidal Intent without Specific Plan - - - - - - no   Q5 Suicide Intent with Specific Plan - - - - - - no   Q6 Suicide Behavior (Lifetime) - - - - - - no   Level of Risk per Screen - - - - - - low risk     GAIN-sliding scale:  When was the last time that you had significant problems... 7/11/2022   with feeling very trapped, lonely, sad, blue, depressed or hopeless about the future? Past month   with sleep trouble, such as bad dreams, sleeping restlessly, or falling asleep during the day? Past Month   with feeling very anxious, nervous, tense, scared, panicked or like something bad was going to happen? Past month   with becoming very distressed & upset when something reminded you of the past? 2 to 12 months ago   with thinking about ending your life or committing suicide? Never      When was the last time that you did the following things 2 or more times? 7/11/2022   Lied or conned to get things you  "wanted or to avoid having to do something? Never   Had a hard time paying attention at school, work or home? Past month   Had a hard time listening to instructions at school, work or home? 1+ years ago   Were a bully or threatened other people? Never   Started physical fights with other people? Never       Personal and Family Medical History:  Patient did not report a family history of mental health concerns.  Patient reports family history includes Substance Abuse in his father, maternal grandfather, and mother..      Patient reported the following previous mental health diagnoses: \"Autism, Aspergers, Depression\".  Patient reports their primary mental health symptoms include:  \"none\" and these do not impact his ability to function.   Patient has received mental health services in the past: \"medication and therapy as an adolescent.\"   Psychiatric Hospitalizations: None.  Patient denies a history of civil commitment.  Current mental health services/providers include:  \"medication\".    Patient has had a physical exam to rule out medical causes for current symptoms.  Date of last physical exam was within the past year. Client was encouraged to follow up with PCP if symptoms were to develop. The patient has a [place] Primary Care Provider, who is named Gary Rodrigues..  Patient reports the following current medical concerns: April 6-12, \"in the hospital\".  Patient reports pain concerns including muscle aches from liver damage..  Patient does want help addressing pain concerns.   There are not significant appetite / nutritional concerns / weight changes, but [id number] I lost a lot of weight because of drinking and \"obese as an adolescent and as a young adult.\" Patient does not report a history of an eating disorder.  Patient does not report a history of head injury / trauma / cognitive impairment.    Past Medical History:   Diagnosis Date     Diabetes (H)    Cirrhosis of the liver.    Patient reports current meds " as:   Outpatient Medications Marked as Taking for the 7/11/22 encounter (Hospital Encounter) with Kianna Gusman LADC   Medication Sig     alcohol swab prep pads Use to swab area of injection/marsha as directed.     blood glucose (NO BRAND SPECIFIED) test strip Use to test blood sugar 4 times daily or as directed. To accompany: Blood Glucose Monitor Brands: per insurance.     blood glucose monitoring (NO BRAND SPECIFIED) meter device kit Use to test blood sugar 4 times daily or as directed. Preferred blood glucose meter OR supplies to accompany: Blood Glucose Monitor Brands: per insurance.     FLUoxetine (PROZAC) 40 MG capsule Take 40 mg by mouth At Bedtime     folic acid (FOLVITE) 1 MG tablet Take 1 tablet (1 mg) by mouth daily     furosemide (LASIX) 40 MG tablet Take 1 tablet (40 mg) by mouth 2 times daily     gabapentin (NEURONTIN) 100 MG capsule      insulin aspart (NOVOLOG PEN) 100 UNIT/ML pen      insulin glargine (LANTUS SOLOSTAR) 100 UNIT/ML pen Inject 45 Units Subcutaneous At Bedtime     insulin pen needle (ULTICARE MICRO) 32G X 4 MM miscellaneous Use pen needles daily or as directed.     lactulose (CHRONULAC) 10 GM/15ML solution Take 30 mLs (20 g) by mouth 4 times daily (Patient taking differently: Take 20 g by mouth 3 times daily)     magnesium oxide (MAG-OX) 400 MG tablet Take 1 tablet (400 mg) by mouth daily     multivitamin, therapeutic (THERA-VIT) TABS tablet Take 1 tablet by mouth in the morning.     pantoprazole (PROTONIX) 40 MG EC tablet Take 1 tablet (40 mg) by mouth 2 times daily     predniSONE (DELTASONE) 10 MG tablet Take 1 tablet (10 mg) by mouth daily     spironolactone (ALDACTONE) 25 MG tablet Take 1 tablet (25 mg) by mouth 3 times daily     thiamine (B-1) 100 MG tablet Take 1 tablet (100 mg) by mouth in the morning.     thin (NO BRAND SPECIFIED) lancets Use with lanceting device. To accompany: Blood Glucose Monitor Brands: per insurance.       Medication Adherence:  Patient reports  "taking prescribed medications as prescribed.      Patient Allergies:  No Known Allergies    Medical History:    Past Medical History:   Diagnosis Date     Diabetes (H)        Substance Use:  Patient reported the following biological family members or relatives with chemical health issues:  mother has abused RX medication and alcohol in the past and his father has abused alcohol in the past...  Patient has received substance use disorder and/or gambling treatment in the past.  Patient reports the following dates and locations of treatment services:  \"inpatient in [place] in 2018.\"  Patient has not ever been to detox, but has experienced withdrawal when hospitalized.  Patient is not currently receiving any chemical dependency treatment. Patient reports they have attended the following support groups: AA in the past.        Substance Age of first use Pattern and duration of use (include amounts and frequency) Date of last use     Withdrawal potential Route of administration   has used Alcohol Age 18 Age 21-24- \"daily drinking,  not as bad as the last 4 years, but drinking enough to end up in Rehab.  \" When I fell off the wagon in August of 2018 after 42 days of sobriety up until April 6, I was drinking at least a 12-pack of beer a day or more and sometimes wine or mixed drinks.\"     Prior to the ER visit April 7th, I consumed a lot of alcohol, a variety of beverages such as beer, wine, Rum and I had a little blood pimple I could not stop bleeding.  I probably had a couple of mixed drinks a few bottles of wine and a lot of bottles of beer.\" April 6, 2022 No oral   has used Marijuana   Age [age] \"never seeked it out and never bought it myself.\" \"sometime in the last 4 years, but I couldn't tell you.\" No smoked     has not used Amphetamines          has not used Cocaine/crack           has not used Hallucinogens        has not used Inhalants        has not used Heroin        has not used Other Opiates        has not used " "Benzodiazepine          has not used Barbiturates        has not used Over the counter meds.        has use Caffeine  Age 2 Coffee-\"I like it once in awhile, 2 cups a week.\"  Soda pop and Energy drinks -\"sometimes\" 7/11/2022   No oral   has used Nicotine  Age [age] Smoked in the past, a pack over 3-5 months.  \"but would not call myself a former smoker even\". 2020 No smoked   has not used other substances not listed above:  Identify:             Patient reported the following problems as a result of their substance use: Liver Cirrhosis.  Patient had been concerned about substance use. Patient reports his parents have been concerned about their substance use.  Patient reports their recovery goals are\" to heal and improve my health.\"    Patient reports experiencing the following withdrawal symptoms within the past 12 months: sweating, shaky/jittery/tremors, fatigue, vivid/unpleasant dreams, high blood pressure, diarrhea, hallucinations and fever and the following within the past 30 days: none.   Patients reports urges to use Alcohol in the past. Patient reports he has used more Alcohol than intended and over a longer period of time than intended. Patient reports he has had unsuccessful attempts to cut down or control use of Alcohol.  Patient reports longest period of abstinence \"42 days\" and return to use was due to \"my mom having a bad reaction to medication and I was horrified and scared and I fell off the wagon to cope.\"  Patient reports he has needed to use more Alcohol to achieve the same effect.  Patient does  report diminished effect with use of same amount of Alcohol.     Patient does  report a great deal of time is spent in activities necessary to obtain, use, or recover from Alcohol effects.  Patient does  report important social, occupational, or recreational activities are given up or reduced because of Alcohol use.  Alcohol use is continued despite knowledge of having a persistent or recurrent physical or " "psychological problem that is likely to have caused or exacerbated by use.  Patient reports the following problem behaviors while under the influence of substances \"falling in the earlier days and vomiting back then.  More recently, isolating and listening to music.\"    Patient reports substance use has ever impacted their ability to function in a school setting. Patient reports substance use has ever impacted their ability to function in a work setting.  Patients demographics and history impact their recovery in the following ways:  Some family HX of ROBERT.    Patient does not have a history of gambling concerns and/or treatment.  Patient does not have other addictive behaviors he is concerned about.        Dimension Scale Ratings:    Dimension 1 -  Acute Intoxication/Withdrawal: 0 - No Problem The pt reports over 90 days of abstinence beginning April 7th, 2022.  Dimension 2 - Biomedical: 3 - Severe Problem The pt reprots he has Type two Diabetes and Liver Disease.  Dimension 3 - Emotional/Behavioral/Cognitive Conditions: 2 - Moderate Problem The pt reports DX of Aspergers/Autism and Depression.  He reports no SI/SIB or SA in his lifetime.  Dimension 4 - Readiness to Change:  1 - Minor Problem The pt is expressing a desire to continue abstinence from alcohol.  He has had some unsuccessful attempts to quit or control his drinking in the past.  Dimension 5 - Relapse/Continued Use/ Continued Problem Potential: 2 - Moderate Problem The pt meets criteria for a severe alcohol use disorder.  He is at a high risk for relapse having co-occurring MH DX and ROBERT.  He is however in early remission at this time and is motivated by liver damage.  He has had one inpatient tx experience and has not been able to maintain long term sobriety in the past.  Dimension 6 - Recovery Environment:  1 - Minor Problem The pt lives with his parents.  he is currently experiencing significant health challenges due to liver damage and has many " "scheduled medical appointments.  He has supportive family and reports having some supportive friends as well.    Significant Losses / Trauma / Abuse / Neglect Issues:   Patient has not served in the .  There are indications or report of significant loss, trauma, abuse or neglect issues related to: \"I watched both my Grandpa and grandma take their last breaths.  Concerns for possible neglect are not present.     Safety Assessment:   Patient denies current homicidal ideation and behaviors.  Patient denies current self-injurious ideation and behaviors.    Patient denied risk behaviors associated with substance use.  Patient denies any high risk behaviors reported substance use associated with mental health symptoms.  Patient reports the following current concerns for their personal safety: None.  Patient reports there are no firearms in the house.       History of Safety Concerns:  Patient denied a history of homicidal ideation.     Patient denied a history of personal safety concerns.    Patient denied a history of assaultive behaviors.    Patient denied a history of sexual assault behaviors.     Patient denied a history of risk behaviors associated with substance use.  Patient reported a history of substance use associated with mental health symptoms.  Patient reports the following protective factors: spirituality, positive relationships positive social network and positive family connections, forward/future oriented thinking, dedication to family/friends, safe and stable environment, abstinence from substances, adherence with prescribed medication, living with other people, sense of meaning, positive social skills, sense of personal control or determination, access to a variety of clinical interventions and pets       Risk Plan:  See Recommendations for Safety and Risk Management Plan      Collateral Contact Summary:   Collateral contacts contributing to this assessment:    His Emergency and Collateral " "Contact, Leticia Salter (Mother), Tel: [phone]     \"He has blood transfusions twice a week and appointments on the other days.  For him to go to tx too many days a week  would be impossible.  He has had hospital stays in the hospital because of health issues.  Due to his Autism, he was  hoping for smaller groups. \"    If court related records were reviewed, summarize here: NA    Information from collateral contacts supported/largely agreed with information from the client and associated risk ratings.    Information in this assessment was obtained from the medical record and provided by patient who is a good historian.    Patient will have open access to their mental health medical record.    Diagnostic Criteria: 1.) Alcohol/drug is often taken in larger amounts or over a longer period than was intended.  Met for Alcohol.  2.) There is a persistent desire or unsuccessful efforts to cut down or control alcohol/drug use.  Met for Alcohol.  3.) A great deal of time is spent in activities necessary to obtain alcohol, use alcohol, or recover from its effects.  Met for Alcohol.  4.) Craving, or a strong desire or urge to use alcohol/drug.  Met for Alcohol.  7.) Important social, occupational, or recreational activities are given up or reduced because of alcohol/drug use.  Met for Alcohol.  9.) Alcohol/drug use is continued despite knowledge of having a persistent or recurrent physical or psychological problem that is likely to have been caused or exacerbated by alcohol.  Met for Alcohol.  10.) Tolerance, as defined by either of the following: A need for markedly increased amounts of alcohol/drug to achieve intoxication or desired effect..  Met for Alcohol.  11.) Withdrawal, as manifested by either of the following: The characteristic withdrawal syndrome for alcohol/drug (refer to Criteria A and B of the criteria set for alcohol/drug withdrawal).. Met for Alcohol.       As evidenced by self report and criteria, client " meets the following DSM5 Diagnoses:   (Sustained by DSM5 Criteria Listed Above)  Alcohol Use Disorder   303.90 (F10.20) Severe in early remission.    Recommendations:     1. Plan for Safety and Risk Management:  Recommended that patient call 911 or go to the local ED should there be a change in any of these risk factors.      Report to child / adult protection services was NA.     2. ROBERT Referrals:     Recommendations:        Due to physical and mental health challenges the pt would be best able to attend outpatient or Intensive outpatient ROBERT programming.   Liver Transplant Team resources such as [facility] and the virtual outpatient program will be provided to the pt.      He could benefit from individual therapy as well.      Pt to follow all recommendations of his Medical and Liver Transplant Team.    Patient reports they are willing to follow these recommendations.  Patient would like the following family or other support people involved in their treatment:  TBD. Patient does not have a history of opiate use.    3. Mental Health Referrals:  The pt may benefit from psychiatric care and individual therapy.     4. Patient's identified that he prefers smaller Tx groups.      5. Recommendations for treatment focus:     Depressed Mood - DX per pt.  Aspergers/Autism-DX per pt..   Health issues including Liver Damage.  Expanding his support network.  Mental health.  Relapse Prevention and Coping Skills.    RESOURCES:    Day Outpatient Treatment with [facility], Virtual M TH 1-3 (Ashkan Singleton to call pt)    Intensive Outpatient with [facility]( discuss with Ashkan Singleton).    L[facility] SUPPORT GROUP(flyer)   Every Thursday Noon-1:30 p.m. (except holidays), registration is not required.    Virtual Support Group    Visit: [email or web address].us/    Click:  Join Meeting     Meeting ID: [id number]    Passcode: [id number]    Dial option: [phone]    Supportive Services:    [facility]   [address] [place] [address] [address]   [place] [address]   Phone: [phone] [email or web address]      [email or web address]  [phone]    Clinical Substantiation: The pt meets criteria for a severe alcohol use disorder.  He is at a high risk for relapse having co-occurring MH DX and ROBERT.  He is however in early remission at this time and is motivated by liver damage.  He has had one inpatient tx experience and has not been able to maintain long term sobriety in the past.    Per Ashkan Singleton with The Liver transplant Team at [facility], he will be following up with the patient.    DAANES record has been saved.  Assessment ID: [id number]      Provider Name/ Credentials:  MARC Woody  July 11, 2022

## 2022-07-12 NOTE — ADDENDUM NOTE
Encounter addended by: Kianna Gusman [facility] on: 7/12/2022 1:24 PM   Actions taken: Clinical Note Signed

## 2022-07-13 ENCOUNTER — CARE COORDINATION (OUTPATIENT)
Dept: BEHAVIORAL HEALTH | Facility: CLINIC | Age: 27
End: 2022-07-13

## 2022-07-13 NOTE — PROGRESS NOTES
Acknowledged in message to Leticia 7/11 ROBERT Assessment and recommendation for IOP treatment for Dillon. Asked for her to call me back to discuss next steps in getting him connected with intake and admission process.     Ashkan Singleton, MARC

## 2022-07-14 ENCOUNTER — LAB (OUTPATIENT)
Dept: INFUSION THERAPY | Facility: CLINIC | Age: 27
End: 2022-07-14
Attending: INTERNAL MEDICINE
Payer: COMMERCIAL

## 2022-07-14 NOTE — PROGRESS NOTES
Infusion Nursing Note:  Dillon Salter presents today for labs and possible blood.    Patient seen by provider today: No   present during visit today: Not Applicable.    Note: NA.    Intravenous Access:  Labs drawn without difficulty.  Peripheral IV placed.    Treatment Conditions:  Lab Results   Component Value Date    HGB 7.2 (L) 07/14/2022    WBC 13.9 (H) 07/14/2022    ANEU 6.5 07/09/2022    ANEUTAUTO 9.8 (H) 07/14/2022     (L) 07/14/2022      Results reviewed, labs did NOT meet treatment parameters: hemoglobin 7.2 today.  Therefore no blood needed as parameters are to transfuse for less than 7.     Post Infusion Assessment:  Not Applicable.     Discharge Plan:   Patient and/or family verbalized understanding of discharge instructions and all questions answered.  Patient discharged in stable condition accompanied by: mother.  Departure Mode: Ambulatory.      Sony Mckeon RN

## 2022-07-18 ENCOUNTER — HOSPITAL ENCOUNTER (OUTPATIENT)
Dept: ULTRASOUND IMAGING | Facility: CLINIC | Age: 27
Discharge: HOME | End: 2022-07-18
Attending: INTERNAL MEDICINE | Admitting: RADIOLOGY
Payer: COMMERCIAL

## 2022-07-18 DIAGNOSIS — K70.31 ALCOHOLIC CIRRHOSIS OF LIVER WITH ASCITES (H): ICD-10-CM

## 2022-07-18 PROCEDURE — 272N000706 US PARACENTESIS WITHOUT ALBUMIN

## 2022-07-18 PROCEDURE — 76705 ECHO EXAM OF ABDOMEN: CPT

## 2022-07-19 ENCOUNTER — PATIENT OUTREACH (OUTPATIENT)
Dept: ONCOLOGY | Facility: CLINIC | Age: 27
End: 2022-07-19

## 2022-07-19 NOTE — PROGRESS NOTES
[facility]: Cancer Care                                                                                           Hematology       Acute posthemorrhagic anemia,  Hepatic encephalopathy,  Alcoholic cirrhosis of liver without ascites        Mother called in and left message, states patient is feeling well and wants to cancel appointments for today.  Reviewed with Dr. Riojas, she said that is ok.  Called patient (no consent to communicate on file for mom), he says he has learned his body and feels comfortable not checking hemoglobin today.   He will sign consent at next visit on 7/21/22.  Signature:  Lorin Thayer RN

## 2022-07-21 ENCOUNTER — LAB (OUTPATIENT)
Dept: INFUSION THERAPY | Facility: CLINIC | Age: 27
End: 2022-07-21
Attending: INTERNAL MEDICINE
Payer: COMMERCIAL

## 2022-07-21 DIAGNOSIS — D62 ACUTE POSTHEMORRHAGIC ANEMIA: ICD-10-CM

## 2022-07-21 LAB
ERYTHROCYTE [DISTWIDTH] IN BLOOD BY AUTOMATED COUNT: 19.2 % (ref 10–15)
HCT VFR BLD AUTO: 21.7 % (ref 40–53)
HGB BLD-MCNC: 7.4 G/DL (ref 13.3–17.7)
MCH RBC QN AUTO: 36.1 PG (ref 26.5–33)
MCHC RBC AUTO-ENTMCNC: 34.1 G/DL (ref 31.5–36.5)
MCV RBC AUTO: 106 FL (ref 78–100)
PLATELET # BLD AUTO: 102 10E3/UL (ref 150–450)
RBC # BLD AUTO: 2.05 10E6/UL (ref 4.4–5.9)
WBC # BLD AUTO: 19 10E3/UL (ref 4–11)

## 2022-07-21 PROCEDURE — 85027 COMPLETE CBC AUTOMATED: CPT

## 2022-07-21 PROCEDURE — 36415 COLL VENOUS BLD VENIPUNCTURE: CPT

## 2022-07-21 NOTE — PROGRESS NOTES
"CBC RESULTS: Recent Labs   Lab Test 07/21/22  1037   WBC 19.0*   RBC 2.05*   HGB 7.4*   HCT 21.7*   *   MCH 36.1*   MCHC 34.1   RDW 19.2*   *     Pt did not require blood transfusion today and feeling \"pretty good\". Maddie Ocampo RN  "

## 2022-07-25 ENCOUNTER — LAB (OUTPATIENT)
Dept: INFUSION THERAPY | Facility: CLINIC | Age: 27
End: 2022-07-25
Attending: INTERNAL MEDICINE
Payer: COMMERCIAL

## 2022-07-25 VITALS
HEART RATE: 94 BPM | TEMPERATURE: 97.9 F | RESPIRATION RATE: 16 BRPM | OXYGEN SATURATION: 94 % | SYSTOLIC BLOOD PRESSURE: 146 MMHG | DIASTOLIC BLOOD PRESSURE: 73 MMHG

## 2022-07-25 DIAGNOSIS — K70.31 ALCOHOLIC CIRRHOSIS OF LIVER WITH ASCITES (H): ICD-10-CM

## 2022-07-25 DIAGNOSIS — D62 ACUTE POSTHEMORRHAGIC ANEMIA: Primary | ICD-10-CM

## 2022-07-25 LAB
ABO/RH(D): NORMAL
ANTIBODY SCREEN: NEGATIVE
BASOPHILS # BLD MANUAL: 0.2 10E3/UL (ref 0–0.2)
BASOPHILS NFR BLD MANUAL: 1 %
BLD PROD TYP BPU: NORMAL
BLOOD COMPONENT TYPE: NORMAL
BURR CELLS BLD QL SMEAR: SLIGHT
CODING SYSTEM: NORMAL
CROSSMATCH: NORMAL
EOSINOPHIL # BLD MANUAL: 0.2 10E3/UL (ref 0–0.7)
EOSINOPHIL NFR BLD MANUAL: 1 %
ERYTHROCYTE [DISTWIDTH] IN BLOOD BY AUTOMATED COUNT: 18.2 % (ref 10–15)
HCT VFR BLD AUTO: 19.5 % (ref 40–53)
HGB BLD-MCNC: 6.5 G/DL (ref 13.3–17.7)
ISSUE DATE AND TIME: NORMAL
LYMPHOCYTES # BLD MANUAL: 3.4 10E3/UL (ref 0.8–5.3)
LYMPHOCYTES NFR BLD MANUAL: 17 %
MCH RBC QN AUTO: 37.4 PG (ref 26.5–33)
MCHC RBC AUTO-ENTMCNC: 33.3 G/DL (ref 31.5–36.5)
MCV RBC AUTO: 112 FL (ref 78–100)
MONOCYTES # BLD MANUAL: 0.8 10E3/UL (ref 0–1.3)
MONOCYTES NFR BLD MANUAL: 4 %
MYELOCYTES # BLD MANUAL: 0.4 10E3/UL
MYELOCYTES NFR BLD MANUAL: 2 %
NEUTROPHILS # BLD MANUAL: 14.9 10E3/UL (ref 1.6–8.3)
NEUTROPHILS NFR BLD MANUAL: 75 %
NRBC # BLD AUTO: 0.2 10E3/UL
NRBC BLD MANUAL-RTO: 1 %
PLAT MORPH BLD: ABNORMAL
PLATELET # BLD AUTO: 82 10E3/UL (ref 150–450)
POLYCHROMASIA BLD QL SMEAR: SLIGHT
RBC # BLD AUTO: 1.74 10E6/UL (ref 4.4–5.9)
RBC MORPH BLD: ABNORMAL
SPECIMEN EXPIRATION DATE: NORMAL
UNIT ABO/RH: NORMAL
UNIT NUMBER: NORMAL
UNIT STATUS: NORMAL
UNIT TYPE ISBT: 600
WBC # BLD AUTO: 19.9 10E3/UL (ref 4–11)

## 2022-07-25 PROCEDURE — 36415 COLL VENOUS BLD VENIPUNCTURE: CPT | Performed by: INTERNAL MEDICINE

## 2022-07-25 PROCEDURE — 36430 TRANSFUSION BLD/BLD COMPNT: CPT

## 2022-07-25 PROCEDURE — P9040 RBC LEUKOREDUCED IRRADIATED: HCPCS | Performed by: INTERNAL MEDICINE

## 2022-07-25 PROCEDURE — 86901 BLOOD TYPING SEROLOGIC RH(D): CPT | Performed by: INTERNAL MEDICINE

## 2022-07-25 PROCEDURE — 80321 ALCOHOLS BIOMARKERS 1OR 2: CPT

## 2022-07-25 PROCEDURE — 86923 COMPATIBILITY TEST ELECTRIC: CPT | Performed by: INTERNAL MEDICINE

## 2022-07-25 PROCEDURE — 85027 COMPLETE CBC AUTOMATED: CPT | Performed by: INTERNAL MEDICINE

## 2022-07-25 PROCEDURE — 85007 BL SMEAR W/DIFF WBC COUNT: CPT | Performed by: INTERNAL MEDICINE

## 2022-07-25 RX ORDER — HEPARIN SODIUM (PORCINE) LOCK FLUSH IV SOLN 100 UNIT/ML 100 UNIT/ML
5 SOLUTION INTRAVENOUS
Status: DISCONTINUED | OUTPATIENT
Start: 2022-07-25 | End: 2022-07-25 | Stop reason: HOSPADM

## 2022-07-25 RX ORDER — HEPARIN SODIUM,PORCINE 10 UNIT/ML
5 VIAL (ML) INTRAVENOUS
Status: CANCELLED | OUTPATIENT
Start: 2022-07-25

## 2022-07-25 RX ORDER — HEPARIN SODIUM,PORCINE 10 UNIT/ML
5 VIAL (ML) INTRAVENOUS
Status: DISCONTINUED | OUTPATIENT
Start: 2022-07-25 | End: 2022-07-25 | Stop reason: HOSPADM

## 2022-07-25 RX ORDER — HEPARIN SODIUM (PORCINE) LOCK FLUSH IV SOLN 100 UNIT/ML 100 UNIT/ML
5 SOLUTION INTRAVENOUS
Status: CANCELLED | OUTPATIENT
Start: 2022-07-25

## 2022-07-25 NOTE — PROGRESS NOTES
Infusion Nursing Note:  Dillon Salter presents today for Peripheral Lab Draw and transfusion of 1 unit PRBC's.    Patient seen by provider today: No   present during visit today: Not Applicable.    Note: Patient ambulated into Infusion Care accompanied by his mother,Leticia. Patient is alert and oriented and VSS, except for BP of 155/88. A peripheral IV was inserted into his right forearm and labs drawn. Hemoglobin was 6.5, WBC was 19.9 and Platelets were 82. Dr. Riojas was informed of lab results. Patient received 1 unit of PRBC's without any problems. Peripheral IV was flushed with Normal Saline and discontinued. Dry 2 x 2 gauze wrapped with Coban over IV insertion site. All questions answered and patient was discharged home with his mother.    Intravenous Access:  Peripheral IV placed.    Treatment Conditions:  Lab Results   Component Value Date    HGB 6.5 (LL) 07/25/2022    WBC 19.9 (H) 07/25/2022    ANEU 14.9 (H) 07/25/2022    ANEUTAUTO 9.8 (H) 07/14/2022    PLT 82 (L) 07/25/2022        Post Infusion Assessment:  Patient tolerated infusion without incident.  Site patent and intact, free from redness, edema or discomfort.  No evidence of extravasations.     Discharge Plan:   Patient and/or family verbalized understanding of discharge instructions and all questions answered.      Holly Barber RN,OCN.

## 2022-07-27 DIAGNOSIS — K70.31 ALCOHOLIC CIRRHOSIS OF LIVER WITH ASCITES (H): Primary | ICD-10-CM

## 2022-07-27 LAB
PLPETH BLD-MCNC: <10 NG/ML
POPETH BLD-MCNC: <10 NG/ML

## 2022-07-28 ENCOUNTER — LAB (OUTPATIENT)
Dept: INFUSION THERAPY | Facility: CLINIC | Age: 27
DRG: 638 | End: 2022-07-28
Attending: INTERNAL MEDICINE
Payer: COMMERCIAL

## 2022-07-28 ENCOUNTER — HOSPITAL ENCOUNTER (INPATIENT)
Facility: CLINIC | Age: 27
LOS: 5 days | Discharge: HOME | DRG: 638 | End: 2022-08-02
Attending: EMERGENCY MEDICINE | Admitting: INTERNAL MEDICINE
Payer: COMMERCIAL

## 2022-07-28 VITALS
TEMPERATURE: 98 F | RESPIRATION RATE: 16 BRPM | DIASTOLIC BLOOD PRESSURE: 78 MMHG | SYSTOLIC BLOOD PRESSURE: 161 MMHG | OXYGEN SATURATION: 97 % | HEART RATE: 108 BPM

## 2022-07-28 DIAGNOSIS — E11.69 DIABETES MELLITUS TYPE 2 IN OBESE: Chronic | ICD-10-CM

## 2022-07-28 DIAGNOSIS — K70.31 ALCOHOLIC CIRRHOSIS OF LIVER WITH ASCITES (H): ICD-10-CM

## 2022-07-28 DIAGNOSIS — E87.5 HYPERKALEMIA: ICD-10-CM

## 2022-07-28 DIAGNOSIS — F10.20 ALCOHOL USE DISORDER, SEVERE, DEPENDENCE (H): ICD-10-CM

## 2022-07-28 DIAGNOSIS — D62 ACUTE POSTHEMORRHAGIC ANEMIA: Primary | ICD-10-CM

## 2022-07-28 DIAGNOSIS — E87.1 HYPONATREMIA: ICD-10-CM

## 2022-07-28 DIAGNOSIS — D63.8 ANEMIA IN OTHER CHRONIC DISEASES CLASSIFIED ELSEWHERE: Primary | ICD-10-CM

## 2022-07-28 DIAGNOSIS — R65.10 SIRS (SYSTEMIC INFLAMMATORY RESPONSE SYNDROME) (H): ICD-10-CM

## 2022-07-28 DIAGNOSIS — E66.9 DIABETES MELLITUS TYPE 2 IN OBESE: Chronic | ICD-10-CM

## 2022-07-28 DIAGNOSIS — R73.9 HYPERGLYCEMIA: ICD-10-CM

## 2022-07-28 LAB
ACANTHOCYTES BLD QL SMEAR: ABNORMAL
ALBUMIN SERPL-MCNC: 3.2 G/DL (ref 3.5–5)
ALBUMIN SERPL-MCNC: 3.4 G/DL (ref 3.5–5)
ALBUMIN SERPL-MCNC: 3.4 G/DL (ref 3.5–5)
ALBUMIN UR-MCNC: NEGATIVE MG/DL
ALP SERPL-CCNC: 300 U/L (ref 45–120)
ALP SERPL-CCNC: 303 U/L (ref 45–120)
ALP SERPL-CCNC: 304 U/L (ref 45–120)
ALT SERPL W P-5'-P-CCNC: 57 U/L (ref 0–45)
ALT SERPL W P-5'-P-CCNC: 59 U/L (ref 0–45)
ALT SERPL W P-5'-P-CCNC: 59 U/L (ref 0–45)
AMMONIA PLAS-SCNC: 72 UMOL/L (ref 11–35)
AMPHETAMINES UR QL SCN: NORMAL
ANION GAP SERPL CALCULATED.3IONS-SCNC: 10 MMOL/L (ref 5–18)
ANION GAP SERPL CALCULATED.3IONS-SCNC: 10 MMOL/L (ref 5–18)
ANION GAP SERPL CALCULATED.3IONS-SCNC: 7 MMOL/L (ref 5–18)
ANION GAP SERPL CALCULATED.3IONS-SCNC: 8 MMOL/L (ref 5–18)
APPEARANCE UR: CLEAR
AST SERPL W P-5'-P-CCNC: 80 U/L (ref 0–40)
AST SERPL W P-5'-P-CCNC: 86 U/L (ref 0–40)
AST SERPL W P-5'-P-CCNC: 87 U/L (ref 0–40)
ATRIAL RATE - MUSE: 110 BPM
BARBITURATES UR QL: NORMAL
BASE EXCESS BLDV CALC-SCNC: 13.3 MMOL/L
BASOPHILS # BLD MANUAL: 0 10E3/UL (ref 0–0.2)
BASOPHILS # BLD MANUAL: 0.2 10E3/UL (ref 0–0.2)
BASOPHILS NFR BLD MANUAL: 0 %
BASOPHILS NFR BLD MANUAL: 1 %
BENZODIAZ UR QL: NORMAL
BILIRUB DIRECT SERPL-MCNC: 6.2 MG/DL
BILIRUB DIRECT SERPL-MCNC: 6.5 MG/DL
BILIRUB SERPL-MCNC: 21.7 MG/DL (ref 0–1)
BILIRUB SERPL-MCNC: 22.9 MG/DL (ref 0–1)
BILIRUB SERPL-MCNC: 24.5 MG/DL (ref 0–1)
BILIRUB UR QL STRIP: NEGATIVE
BLD PROD TYP BPU: NORMAL
BLOOD COMPONENT TYPE: NORMAL
BUN SERPL-MCNC: 43 MG/DL (ref 8–22)
BUN SERPL-MCNC: 47 MG/DL (ref 8–22)
BUN SERPL-MCNC: 47 MG/DL (ref 8–22)
BUN SERPL-MCNC: 49 MG/DL (ref 8–22)
BURR CELLS BLD QL SMEAR: SLIGHT
CALCIUM SERPL-MCNC: 10 MG/DL (ref 8.5–10.5)
CALCIUM SERPL-MCNC: 9.8 MG/DL (ref 8.5–10.5)
CANNABINOIDS UR QL SCN: NORMAL
CHLORIDE BLD-SCNC: 82 MMOL/L (ref 98–107)
CHLORIDE BLD-SCNC: 82 MMOL/L (ref 98–107)
CHLORIDE BLD-SCNC: 83 MMOL/L (ref 98–107)
CHLORIDE BLD-SCNC: 91 MMOL/L (ref 98–107)
CO2 SERPL-SCNC: 31 MMOL/L (ref 22–31)
CO2 SERPL-SCNC: 31 MMOL/L (ref 22–31)
CO2 SERPL-SCNC: 33 MMOL/L (ref 22–31)
CO2 SERPL-SCNC: 33 MMOL/L (ref 22–31)
COCAINE UR QL: NORMAL
CODING SYSTEM: NORMAL
COLOR UR AUTO: YELLOW
CREAT SERPL-MCNC: 0.74 MG/DL (ref 0.7–1.3)
CREAT SERPL-MCNC: 0.85 MG/DL (ref 0.7–1.3)
CREAT SERPL-MCNC: 0.88 MG/DL (ref 0.7–1.3)
CREAT SERPL-MCNC: 0.92 MG/DL (ref 0.7–1.3)
CREAT UR-MCNC: 19 MG/DL
CROSSMATCH: NORMAL
DIASTOLIC BLOOD PRESSURE - MUSE: NORMAL MMHG
EOSINOPHIL # BLD MANUAL: 0 10E3/UL (ref 0–0.7)
EOSINOPHIL # BLD MANUAL: 0.6 10E3/UL (ref 0–0.7)
EOSINOPHIL NFR BLD MANUAL: 0 %
EOSINOPHIL NFR BLD MANUAL: 3 %
ERYTHROCYTE [DISTWIDTH] IN BLOOD BY AUTOMATED COUNT: 21 % (ref 10–15)
ERYTHROCYTE [DISTWIDTH] IN BLOOD BY AUTOMATED COUNT: 21.4 % (ref 10–15)
ERYTHROCYTE [DISTWIDTH] IN BLOOD BY AUTOMATED COUNT: 22.5 % (ref 10–15)
ETHANOL SERPL-MCNC: <10 MG/DL
GFR SERPL CREATININE-BSD FRML MDRD: >90 ML/MIN/1.73M2
GLUCOSE BLD-MCNC: 36 MG/DL (ref 70–125)
GLUCOSE BLD-MCNC: 630 MG/DL (ref 70–125)
GLUCOSE BLD-MCNC: 647 MG/DL (ref 70–125)
GLUCOSE BLD-MCNC: 694 MG/DL (ref 70–125)
GLUCOSE BLDC GLUCOMTR-MCNC: 157 MG/DL (ref 70–99)
GLUCOSE BLDC GLUCOMTR-MCNC: 246 MG/DL (ref 70–99)
GLUCOSE BLDC GLUCOMTR-MCNC: 259 MG/DL (ref 70–99)
GLUCOSE BLDC GLUCOMTR-MCNC: 262 MG/DL (ref 70–99)
GLUCOSE BLDC GLUCOMTR-MCNC: 285 MG/DL (ref 70–99)
GLUCOSE BLDC GLUCOMTR-MCNC: 341 MG/DL (ref 70–99)
GLUCOSE BLDC GLUCOMTR-MCNC: 37 MG/DL (ref 70–99)
GLUCOSE BLDC GLUCOMTR-MCNC: 486 MG/DL (ref 70–99)
GLUCOSE BLDC GLUCOMTR-MCNC: 571 MG/DL (ref 70–99)
GLUCOSE BLDC GLUCOMTR-MCNC: 66 MG/DL (ref 70–99)
GLUCOSE UR STRIP-MCNC: >1000 MG/DL
HBA1C MFR BLD: 5.4 %
HCO3 BLDV-SCNC: 35 MMOL/L (ref 24–30)
HCT VFR BLD AUTO: 19.8 % (ref 40–53)
HCT VFR BLD AUTO: 20.8 % (ref 40–53)
HCT VFR BLD AUTO: 24.2 % (ref 40–53)
HGB BLD-MCNC: 6.9 G/DL (ref 13.3–17.7)
HGB BLD-MCNC: 7.2 G/DL (ref 13.3–17.7)
HGB BLD-MCNC: 8.3 G/DL (ref 13.3–17.7)
HGB UR QL STRIP: NEGATIVE
INR PPP: 1.87 (ref 0.85–1.15)
INR PPP: 1.9 (ref 0.85–1.15)
INTERPRETATION ECG - MUSE: NORMAL
ISSUE DATE AND TIME: NORMAL
KETONES UR STRIP-MCNC: NEGATIVE MG/DL
LACTATE SERPL-SCNC: 1.1 MMOL/L (ref 0.7–2)
LEUKOCYTE ESTERASE UR QL STRIP: NEGATIVE
LIPASE SERPL-CCNC: 35 U/L (ref 0–52)
LYMPHOCYTES # BLD MANUAL: 2 10E3/UL (ref 0.8–5.3)
LYMPHOCYTES # BLD MANUAL: 4.6 10E3/UL (ref 0.8–5.3)
LYMPHOCYTES NFR BLD MANUAL: 10 %
LYMPHOCYTES NFR BLD MANUAL: 23 %
MAGNESIUM SERPL-MCNC: 1.9 MG/DL (ref 1.8–2.6)
MCH RBC QN AUTO: 34.6 PG (ref 26.5–33)
MCH RBC QN AUTO: 35.3 PG (ref 26.5–33)
MCH RBC QN AUTO: 36.5 PG (ref 26.5–33)
MCHC RBC AUTO-ENTMCNC: 34.3 G/DL (ref 31.5–36.5)
MCHC RBC AUTO-ENTMCNC: 34.6 G/DL (ref 31.5–36.5)
MCHC RBC AUTO-ENTMCNC: 34.8 G/DL (ref 31.5–36.5)
MCV RBC AUTO: 101 FL (ref 78–100)
MCV RBC AUTO: 102 FL (ref 78–100)
MCV RBC AUTO: 105 FL (ref 78–100)
METAMYELOCYTES # BLD MANUAL: 0.2 10E3/UL
METAMYELOCYTES NFR BLD MANUAL: 1 %
METHADONE UR QL SCN: NORMAL
MONOCYTES # BLD MANUAL: 0.6 10E3/UL (ref 0–1.3)
MONOCYTES # BLD MANUAL: 1.8 10E3/UL (ref 0–1.3)
MONOCYTES NFR BLD MANUAL: 3 %
MONOCYTES NFR BLD MANUAL: 9 %
NEUTROPHILS # BLD MANUAL: 14.5 10E3/UL (ref 1.6–8.3)
NEUTROPHILS # BLD MANUAL: 15.1 10E3/UL (ref 1.6–8.3)
NEUTROPHILS NFR BLD MANUAL: 73 %
NEUTROPHILS NFR BLD MANUAL: 77 %
NITRATE UR QL: NEGATIVE
OPIATES UR QL SCN: NORMAL
OXYCODONE UR QL: NORMAL
OXYHGB MFR BLDV: 64.6 % (ref 70–75)
P AXIS - MUSE: 67 DEGREES
PCO2 BLDV: 57 MM HG (ref 35–50)
PCP UR QL SCN: NORMAL
PH BLDV: 7.43 [PH] (ref 7.35–7.45)
PH UR STRIP: 6.5 [PH] (ref 5–7)
PLAT MORPH BLD: ABNORMAL
PLAT MORPH BLD: ABNORMAL
PLATELET # BLD AUTO: 58 10E3/UL (ref 150–450)
PLATELET # BLD AUTO: 66 10E3/UL (ref 150–450)
PLATELET # BLD AUTO: 70 10E3/UL (ref 150–450)
PO2 BLDV: 36 MM HG (ref 25–47)
POTASSIUM BLD-SCNC: 4.7 MMOL/L (ref 3.5–5)
POTASSIUM BLD-SCNC: 6 MMOL/L (ref 3.5–5)
POTASSIUM BLD-SCNC: 6.2 MMOL/L (ref 3.5–5)
POTASSIUM BLD-SCNC: 6.4 MMOL/L (ref 3.5–5)
PR INTERVAL - MUSE: 196 MS
PROCALCITONIN SERPL-MCNC: 1.53 NG/ML (ref 0–0.49)
PROT SERPL-MCNC: 7.8 G/DL (ref 6–8)
PROT SERPL-MCNC: 8.1 G/DL (ref 6–8)
PROT SERPL-MCNC: 8.3 G/DL (ref 6–8)
QRS DURATION - MUSE: 96 MS
QT - MUSE: 326 MS
QTC - MUSE: 441 MS
R AXIS - MUSE: 45 DEGREES
RBC # BLD AUTO: 1.89 10E6/UL (ref 4.4–5.9)
RBC # BLD AUTO: 2.04 10E6/UL (ref 4.4–5.9)
RBC # BLD AUTO: 2.4 10E6/UL (ref 4.4–5.9)
RBC MORPH BLD: ABNORMAL
RBC MORPH BLD: ABNORMAL
RBC URINE: 1 /HPF
SAO2 % BLDV: 68.6 % (ref 70–75)
SARS-COV-2 RNA RESP QL NAA+PROBE: NEGATIVE
SODIUM SERPL-SCNC: 123 MMOL/L (ref 136–145)
SODIUM SERPL-SCNC: 132 MMOL/L (ref 136–145)
SP GR UR STRIP: 1.02 (ref 1–1.03)
SQUAMOUS EPITHELIAL: <1 /HPF
SYSTOLIC BLOOD PRESSURE - MUSE: NORMAL MMHG
T AXIS - MUSE: 42 DEGREES
UNIT ABO/RH: NORMAL
UNIT NUMBER: NORMAL
UNIT STATUS: NORMAL
UNIT TYPE ISBT: 600
UROBILINOGEN UR STRIP-MCNC: 12 MG/DL
VENTRICULAR RATE- MUSE: 110 BPM
WBC # BLD AUTO: 18.7 10E3/UL (ref 4–11)
WBC # BLD AUTO: 19.6 10E3/UL (ref 4–11)
WBC # BLD AUTO: 19.9 10E3/UL (ref 4–11)
WBC URINE: <1 /HPF

## 2022-07-28 PROCEDURE — 82310 ASSAY OF CALCIUM: CPT | Performed by: INTERNAL MEDICINE

## 2022-07-28 PROCEDURE — 83690 ASSAY OF LIPASE: CPT | Performed by: EMERGENCY MEDICINE

## 2022-07-28 PROCEDURE — 85027 COMPLETE CBC AUTOMATED: CPT | Performed by: EMERGENCY MEDICINE

## 2022-07-28 PROCEDURE — 84450 TRANSFERASE (AST) (SGOT): CPT

## 2022-07-28 PROCEDURE — 96372 THER/PROPH/DIAG INJ SC/IM: CPT | Performed by: EMERGENCY MEDICINE

## 2022-07-28 PROCEDURE — 85007 BL SMEAR W/DIFF WBC COUNT: CPT | Performed by: EMERGENCY MEDICINE

## 2022-07-28 PROCEDURE — 250N000011 HC RX IP 250 OP 636: Performed by: STUDENT IN AN ORGANIZED HEALTH CARE EDUCATION/TRAINING PROGRAM

## 2022-07-28 PROCEDURE — 96365 THER/PROPH/DIAG IV INF INIT: CPT

## 2022-07-28 PROCEDURE — 272N000452 HC KIT SHRLOCK 5FR POWER PICC TRIPLE LUMEN

## 2022-07-28 PROCEDURE — 83036 HEMOGLOBIN GLYCOSYLATED A1C: CPT | Performed by: EMERGENCY MEDICINE

## 2022-07-28 PROCEDURE — 80307 DRUG TEST PRSMV CHEM ANLYZR: CPT | Performed by: EMERGENCY MEDICINE

## 2022-07-28 PROCEDURE — 120N000004 HC R&B MS OVERFLOW

## 2022-07-28 PROCEDURE — P9016 RBC LEUKOCYTES REDUCED: HCPCS | Performed by: INTERNAL MEDICINE

## 2022-07-28 PROCEDURE — 87040 BLOOD CULTURE FOR BACTERIA: CPT | Performed by: EMERGENCY MEDICINE

## 2022-07-28 PROCEDURE — 36569 INSJ PICC 5 YR+ W/O IMAGING: CPT

## 2022-07-28 PROCEDURE — 36430 TRANSFUSION BLD/BLD COMPNT: CPT

## 2022-07-28 PROCEDURE — 258N000003 HC RX IP 258 OP 636: Performed by: INTERNAL MEDICINE

## 2022-07-28 PROCEDURE — 36415 COLL VENOUS BLD VENIPUNCTURE: CPT | Performed by: EMERGENCY MEDICINE

## 2022-07-28 PROCEDURE — 85027 COMPLETE CBC AUTOMATED: CPT

## 2022-07-28 PROCEDURE — 85610 PROTHROMBIN TIME: CPT

## 2022-07-28 PROCEDURE — 250N000009 HC RX 250: Performed by: EMERGENCY MEDICINE

## 2022-07-28 PROCEDURE — U0005 INFEC AGEN DETEC AMPLI PROBE: HCPCS | Performed by: EMERGENCY MEDICINE

## 2022-07-28 PROCEDURE — 85007 BL SMEAR W/DIFF WBC COUNT: CPT

## 2022-07-28 PROCEDURE — 84145 PROCALCITONIN (PCT): CPT | Performed by: EMERGENCY MEDICINE

## 2022-07-28 PROCEDURE — 85014 HEMATOCRIT: CPT

## 2022-07-28 PROCEDURE — 82248 BILIRUBIN DIRECT: CPT

## 2022-07-28 PROCEDURE — 84155 ASSAY OF PROTEIN SERUM: CPT

## 2022-07-28 PROCEDURE — 250N000012 HC RX MED GY IP 250 OP 636 PS 637: Performed by: EMERGENCY MEDICINE

## 2022-07-28 PROCEDURE — 83605 ASSAY OF LACTIC ACID: CPT | Performed by: INTERNAL MEDICINE

## 2022-07-28 PROCEDURE — 82310 ASSAY OF CALCIUM: CPT

## 2022-07-28 PROCEDURE — 250N000013 HC RX MED GY IP 250 OP 250 PS 637: Performed by: INTERNAL MEDICINE

## 2022-07-28 PROCEDURE — 99291 CRITICAL CARE FIRST HOUR: CPT

## 2022-07-28 PROCEDURE — 250N000011 HC RX IP 250 OP 636: Performed by: INTERNAL MEDICINE

## 2022-07-28 PROCEDURE — 93005 ELECTROCARDIOGRAM TRACING: CPT | Performed by: STUDENT IN AN ORGANIZED HEALTH CARE EDUCATION/TRAINING PROGRAM

## 2022-07-28 PROCEDURE — 83735 ASSAY OF MAGNESIUM: CPT | Performed by: EMERGENCY MEDICINE

## 2022-07-28 PROCEDURE — 85610 PROTHROMBIN TIME: CPT | Performed by: EMERGENCY MEDICINE

## 2022-07-28 PROCEDURE — 82140 ASSAY OF AMMONIA: CPT | Performed by: EMERGENCY MEDICINE

## 2022-07-28 PROCEDURE — 82077 ASSAY SPEC XCP UR&BREATH IA: CPT | Performed by: EMERGENCY MEDICINE

## 2022-07-28 PROCEDURE — 250N000009 HC RX 250: Performed by: STUDENT IN AN ORGANIZED HEALTH CARE EDUCATION/TRAINING PROGRAM

## 2022-07-28 PROCEDURE — 99223 1ST HOSP IP/OBS HIGH 75: CPT | Performed by: INTERNAL MEDICINE

## 2022-07-28 PROCEDURE — 96361 HYDRATE IV INFUSION ADD-ON: CPT

## 2022-07-28 PROCEDURE — 84450 TRANSFERASE (AST) (SGOT): CPT | Performed by: EMERGENCY MEDICINE

## 2022-07-28 PROCEDURE — 258N000001 HC RX 258: Performed by: STUDENT IN AN ORGANIZED HEALTH CARE EDUCATION/TRAINING PROGRAM

## 2022-07-28 PROCEDURE — 96375 TX/PRO/DX INJ NEW DRUG ADDON: CPT

## 2022-07-28 PROCEDURE — 81001 URINALYSIS AUTO W/SCOPE: CPT | Performed by: EMERGENCY MEDICINE

## 2022-07-28 PROCEDURE — 82805 BLOOD GASES W/O2 SATURATION: CPT | Performed by: EMERGENCY MEDICINE

## 2022-07-28 PROCEDURE — 36415 COLL VENOUS BLD VENIPUNCTURE: CPT

## 2022-07-28 PROCEDURE — 258N000003 HC RX IP 258 OP 636: Performed by: EMERGENCY MEDICINE

## 2022-07-28 PROCEDURE — 250N000012 HC RX MED GY IP 250 OP 636 PS 637: Performed by: INTERNAL MEDICINE

## 2022-07-28 RX ORDER — MAGNESIUM OXIDE 400 MG/1
400 TABLET ORAL DAILY
Status: DISCONTINUED | OUTPATIENT
Start: 2022-07-29 | End: 2022-08-02 | Stop reason: HOSPADM

## 2022-07-28 RX ORDER — PREDNISONE 5 MG/1
10 TABLET ORAL EVERY EVENING
Status: DISCONTINUED | OUTPATIENT
Start: 2022-07-28 | End: 2022-07-29

## 2022-07-28 RX ORDER — ONDANSETRON 2 MG/ML
4 INJECTION INTRAMUSCULAR; INTRAVENOUS EVERY 6 HOURS PRN
Status: DISCONTINUED | OUTPATIENT
Start: 2022-07-28 | End: 2022-08-02 | Stop reason: HOSPADM

## 2022-07-28 RX ORDER — FOLIC ACID 1 MG/1
1 TABLET ORAL DAILY
Status: DISCONTINUED | OUTPATIENT
Start: 2022-07-29 | End: 2022-08-02 | Stop reason: HOSPADM

## 2022-07-28 RX ORDER — LACTULOSE 10 G/15ML
20 SOLUTION ORAL 3 TIMES DAILY
Status: DISCONTINUED | OUTPATIENT
Start: 2022-07-28 | End: 2022-08-02 | Stop reason: HOSPADM

## 2022-07-28 RX ORDER — DEXTROSE MONOHYDRATE 25 G/50ML
25-50 INJECTION, SOLUTION INTRAVENOUS
Status: DISCONTINUED | OUTPATIENT
Start: 2022-07-28 | End: 2022-08-02 | Stop reason: HOSPADM

## 2022-07-28 RX ORDER — MULTIVITAMIN,THERAPEUTIC
1 TABLET ORAL DAILY
Status: DISCONTINUED | OUTPATIENT
Start: 2022-07-29 | End: 2022-08-02 | Stop reason: HOSPADM

## 2022-07-28 RX ORDER — PANTOPRAZOLE SODIUM 20 MG/1
40 TABLET, DELAYED RELEASE ORAL 2 TIMES DAILY
Status: DISCONTINUED | OUTPATIENT
Start: 2022-07-28 | End: 2022-08-02 | Stop reason: HOSPADM

## 2022-07-28 RX ORDER — FUROSEMIDE 40 MG
40 TABLET ORAL 2 TIMES DAILY
Status: DISCONTINUED | OUTPATIENT
Start: 2022-07-29 | End: 2022-08-02 | Stop reason: HOSPADM

## 2022-07-28 RX ORDER — DEXTROSE MONOHYDRATE 25 G/50ML
50 INJECTION, SOLUTION INTRAVENOUS ONCE
Status: DISCONTINUED | OUTPATIENT
Start: 2022-07-28 | End: 2022-08-02 | Stop reason: HOSPADM

## 2022-07-28 RX ORDER — FUROSEMIDE 10 MG/ML
40 INJECTION INTRAMUSCULAR; INTRAVENOUS ONCE
Status: COMPLETED | OUTPATIENT
Start: 2022-07-28 | End: 2022-07-28

## 2022-07-28 RX ORDER — LIDOCAINE 40 MG/G
CREAM TOPICAL
Status: DISCONTINUED | OUTPATIENT
Start: 2022-07-28 | End: 2022-07-28 | Stop reason: CLARIF

## 2022-07-28 RX ORDER — HEPARIN SODIUM (PORCINE) LOCK FLUSH IV SOLN 100 UNIT/ML 100 UNIT/ML
5 SOLUTION INTRAVENOUS
Status: CANCELLED | OUTPATIENT
Start: 2022-07-28

## 2022-07-28 RX ORDER — FLUCONAZOLE 150 MG/1
150 TABLET ORAL DAILY PRN
COMMUNITY
End: 2023-01-21

## 2022-07-28 RX ORDER — NICOTINE POLACRILEX 4 MG
15-30 LOZENGE BUCCAL
Status: DISCONTINUED | OUTPATIENT
Start: 2022-07-28 | End: 2022-08-02 | Stop reason: HOSPADM

## 2022-07-28 RX ORDER — GABAPENTIN 100 MG/1
100 CAPSULE ORAL DAILY PRN
Status: DISCONTINUED | OUTPATIENT
Start: 2022-07-28 | End: 2022-08-02 | Stop reason: HOSPADM

## 2022-07-28 RX ORDER — SPIRONOLACTONE 100 MG/1
100 TABLET, FILM COATED ORAL DAILY
Status: ON HOLD | COMMUNITY
End: 2022-08-02

## 2022-07-28 RX ORDER — CALCIUM GLUCONATE 94 MG/ML
1 INJECTION, SOLUTION INTRAVENOUS ONCE
Status: COMPLETED | OUTPATIENT
Start: 2022-07-28 | End: 2022-07-28

## 2022-07-28 RX ORDER — HEPARIN SODIUM,PORCINE 10 UNIT/ML
5 VIAL (ML) INTRAVENOUS
Status: CANCELLED | OUTPATIENT
Start: 2022-07-28

## 2022-07-28 RX ORDER — LIDOCAINE 40 MG/G
CREAM TOPICAL
Status: DISCONTINUED | OUTPATIENT
Start: 2022-07-28 | End: 2022-08-02 | Stop reason: HOSPADM

## 2022-07-28 RX ORDER — ONDANSETRON 4 MG/1
4 TABLET, ORALLY DISINTEGRATING ORAL EVERY 6 HOURS PRN
Status: DISCONTINUED | OUTPATIENT
Start: 2022-07-28 | End: 2022-08-02 | Stop reason: HOSPADM

## 2022-07-28 RX ADMIN — SODIUM BICARBONATE 50 MEQ: 84 INJECTION INTRAVENOUS at 15:30

## 2022-07-28 RX ADMIN — PREDNISONE 10 MG: 5 TABLET ORAL at 21:17

## 2022-07-28 RX ADMIN — LACTULOSE 20 G: 10 SOLUTION ORAL at 21:17

## 2022-07-28 RX ADMIN — VANCOMYCIN HYDROCHLORIDE 1250 MG: 5 INJECTION, POWDER, LYOPHILIZED, FOR SOLUTION INTRAVENOUS at 22:14

## 2022-07-28 RX ADMIN — SODIUM CHLORIDE 5.67 UNITS: 9 INJECTION, SOLUTION INTRAVENOUS at 17:39

## 2022-07-28 RX ADMIN — LIDOCAINE HYDROCHLORIDE 2 ML: 10 INJECTION, SOLUTION EPIDURAL; INFILTRATION; INTRACAUDAL; PERINEURAL at 15:55

## 2022-07-28 RX ADMIN — INSULIN ASPART 15 UNITS: 100 INJECTION, SOLUTION INTRAVENOUS; SUBCUTANEOUS at 15:26

## 2022-07-28 RX ADMIN — SODIUM CHLORIDE 1000 ML: 9 INJECTION, SOLUTION INTRAVENOUS at 15:27

## 2022-07-28 RX ADMIN — DEXTROSE MONOHYDRATE 50 ML: 25 INJECTION, SOLUTION INTRAVENOUS at 20:01

## 2022-07-28 RX ADMIN — CALCIUM GLUCONATE 1 G: 98 INJECTION, SOLUTION INTRAVENOUS at 16:07

## 2022-07-28 RX ADMIN — RIFAXIMIN 550 MG: 550 TABLET ORAL at 22:11

## 2022-07-28 RX ADMIN — FUROSEMIDE 40 MG: 10 INJECTION, SOLUTION INTRAMUSCULAR; INTRAVENOUS at 21:17

## 2022-07-28 RX ADMIN — INSULIN GLARGINE 45 UNITS: 100 INJECTION, SOLUTION SUBCUTANEOUS at 22:26

## 2022-07-28 RX ADMIN — PANTOPRAZOLE SODIUM 40 MG: 20 TABLET, DELAYED RELEASE ORAL at 21:17

## 2022-07-28 RX ADMIN — FLUOXETINE 40 MG: 20 CAPSULE ORAL at 21:17

## 2022-07-28 ASSESSMENT — ACTIVITIES OF DAILY LIVING (ADL)
DRESSING/BATHING_DIFFICULTY: NO
ADLS_ACUITY_SCORE: 20
FALL_HISTORY_WITHIN_LAST_SIX_MONTHS: NO
DOING_ERRANDS_INDEPENDENTLY_DIFFICULTY: NO
ADLS_ACUITY_SCORE: 20
WALKING_OR_CLIMBING_STAIRS_DIFFICULTY: NO
CHANGE_IN_FUNCTIONAL_STATUS_SINCE_ONSET_OF_CURRENT_ILLNESS/INJURY: NO
TOILETING_ISSUES: NO
WEAR_GLASSES_OR_BLIND: NO
CONCENTRATING,_REMEMBERING_OR_MAKING_DECISIONS_DIFFICULTY: NO
ADLS_ACUITY_SCORE: 20
DIFFICULTY_EATING/SWALLOWING: NO

## 2022-07-28 NOTE — PHARMACY-ADMISSION MEDICATION HISTORY
Pharmacy Note - Admission Medication History    Pertinent Provider Information: None.     ______________________________________________________________________    Prior To Admission (PTA) med list completed and updated in EMR.       PTA Med List   Medication Sig Last Dose     alcohol swab prep pads Use to swab area of injection/marsha as directed.      blood glucose (NO BRAND SPECIFIED) test strip Use to test blood sugar 4 times daily or as directed. To accompany: Blood Glucose Monitor Brands: per insurance.      blood glucose monitoring (NO BRAND SPECIFIED) meter device kit Use to test blood sugar 4 times daily or as directed. Preferred blood glucose meter OR supplies to accompany: Blood Glucose Monitor Brands: per insurance.      fluconazole (DIFLUCAN) 150 MG tablet Take 150 mg by mouth daily as needed Patient to use for 7 days if yeast infection flare up. Not Started     FLUoxetine (PROZAC) 40 MG capsule Take 40 mg by mouth At Bedtime 7/27/2022 at HS     folic acid (FOLVITE) 1 MG tablet Take 1 tablet (1 mg) by mouth daily 7/27/2022 at Unknown time     furosemide (LASIX) 40 MG tablet Take 1 tablet (40 mg) by mouth 2 times daily 7/27/2022 at HS     gabapentin (NEURONTIN) 100 MG capsule Take 100 mg by mouth daily as needed Past Week at Unknown time     insulin glargine (LANTUS SOLOSTAR) 100 UNIT/ML pen Inject 45 Units Subcutaneous At Bedtime (Patient taking differently: Inject 42 Units Subcutaneous At Bedtime) 7/27/2022 at HS     insulin pen needle (ULTICARE MICRO) 32G X 4 MM miscellaneous Use pen needles daily or as directed.      lactulose (CHRONULAC) 10 GM/15ML solution Take 30 mLs (20 g) by mouth 4 times daily (Patient taking differently: Take 20 g by mouth 2 times daily) 7/27/2022 at PM     multivitamin, therapeutic (THERA-VIT) TABS tablet Take 1 tablet by mouth in the morning. 7/27/2022 at PM     pantoprazole (PROTONIX) 40 MG EC tablet Take 1 tablet (40 mg) by mouth 2 times daily 7/27/2022 at HS     predniSONE  (DELTASONE) 10 MG tablet Take 1 tablet (10 mg) by mouth daily 7/27/2022 at PM     spironolactone (ALDACTONE) 100 MG tablet Take 100 mg by mouth daily 7/27/2022 at PM     thiamine (B-1) 100 MG tablet Take 1 tablet (100 mg) by mouth in the morning. 7/27/2022 at AM     thin (NO BRAND SPECIFIED) lancets Use with lanceting device. To accompany: Blood Glucose Monitor Brands: per insurance.        Information source(s): Family member and Mother   Method of interview communication: phone    Summary of Changes to PTA Med List  New: Fluconazole  Discontinued: Novolog, magnesium oxide, rifaximin  Changed: Spironolactone 25 mg changed to 100 mg    Patient was asked about OTC/herbal products specifically.  PTA med list reflects this.    In the past week, patient estimated taking medication this percent of the time:  greater than 90%.    Allergies were reviewed, assessed, and updated with the patient.      Patient does not use any multi-dose medications prior to admission.    The information provided in this note is only as accurate as the sources available at the time of the update(s).    Thank you for the opportunity to participate in the care of this patient.    Guerline Bañuelos, Albaro.  7/28/2022 3:57 PM

## 2022-07-28 NOTE — ED TRIAGE NOTES
Sugar is elevated was 630mg/dL.  Bili and K are also elevated.     Triage Assessment     Row Name 07/28/22 7497       Triage Assessment (Adult)    Airway WDL WDL       Respiratory WDL    Respiratory WDL WDL       Skin Circulation/Temperature WDL    Skin Circulation/Temperature WDL WDL       Cardiac WDL    Cardiac WDL WDL       Peripheral/Neurovascular WDL    Peripheral Neurovascular WDL WDL       Cognitive/Neuro/Behavioral WDL    Cognitive/Neuro/Behavioral WDL WDL

## 2022-07-28 NOTE — H&P
[facility] MEDICINE ADMISSION HISTORY AND PHYSICAL     Assessment & Plan       Dillon Salter is a 26 year old old male with history of alcohol use disorder sober for 3 months, alcoholic cirrhosis, esophageal varices s/p banding, ASD, insulin dependent T2DM who presented to the ED for abnormal labs after receiving his regularly scheduled PRBC infusion. Initial evaluation in the ED notable for BG of 694 and potassium of 6.0. Hyperkalemia protocol initiated. EKG showing sinus tach. WBC of 19 and procalcitonin elevated to 1.53. Blood cultures drawn and currently pending. INR elevated to 1.90. UA indicating elevated urine glucose and urobilinogen of 12.0. UDS negative. WILMAR <10.      Hyperglycemia d/t T2DM  Noncompliance with insulin regimen  -Trending down (694 -> 157)  -Continue on PTA Lantus 42 units nightly  -Start mealtime Aspart sliding scale  -Monitor diet   -Accuchek BG monitoring   -Diabetes educator consult, appreciate recs     Hyperkalemia   -Calcium gluconate, sodium bicarb, insulin given   -Continue hyperkalemia protocol      Alcoholic cirrhosis  -Patient regularly receives PRBC infusions Mondays, Thursdays   -Continue to monitor with daily CBC, transfuse if Hgb <7.0  -Hold PTA spironolactone  -Continue PTA furosemide   -Continue PTA folic acid, thiamine   -Continue PTA prednisone  -Continue PTA  Lactulose  -Continue PTA pantoprazole   -Resume rifaximin   -MELD score: 29    Leukocytosis, elevated procal, new 4/6 holosystolic murmur in the setting of previous S. Aureus bacteremia  -S. Aureus bacteremia noted on blood cultures 5/23/22, echo performed with no evidence of vegetations or significant stenosis/regurg of any valves (5/26/22)  -Blood cultures drawn today   -Will empirically start IV vanco   -Echo order placed to be performed tomorrow (7/29)    Thrombocytopenia   -Continue to monitor with daily CBC     Elevated INR  -Will give PO vitamin K    Depression  -Continue PTA  fluoxetine   -Trazodone ordered for sleep     COVID-19 status  -Infection: denies prior infection  -Vaccination: x3  -Negative upon admission     DVTP: Mechanical Prophylaxis/ Sequential Compression Devices  Code Status: Prior  Disposition: Inpatient   Expected LOS: 2 days   Goals for the hospitalization: Correction of electrolytes, ruling out infection  Disposition Plan      Expected Discharge Date: 07/30/2022              The patient's care was discussed with the Attending Physician, Dr. Willett.  Attending addendum  Patient with a chronic liver disease, esophageal varices status post banding in May, has a chronic anemia with a intermittent transfusion requirements, was sent in after his transfusion for hyperkalemia and hyperglycemia.  He has not been on his usual dosages of insulin, does not feel good with blood sugars drop below 150.  Denies fever chills dizziness lightheadedness chest pain abdominal pain nausea vomiting  Lungs are clear  Heart: S1, S2 with a systolic murmur  Abdomen distended nontender  Labs reviewed  Assessment and plan as above  Continue Lantus with the mealtime insulin added back  Iatrogenic hypoglycemia secondary to large doses of insulin given in ED, corrected with a D50, option not start dextrose infusion  Glucose checks hourly x3 if further monitoring hypoglycemia, subsequently can be AC at bedtime  Blood cultures pending, empiric treatment with the vancomycin given leukocytosis, elevated procalcitonin, new murmur  Nidhi Willett MD      Chief Complaint Abnormal labs      HISTORY     Dillon Salter is a 26 year old old male with history of alcohol use disorder sober for 3 months, alcoholic cirrhosis, esophageal varices s/p banding, ASD, insulin dependent T2DM who presented to the ED for abnormal labs after receiving his regularly scheduled PRBC infusion. Dillon is accompanied to the visit by his mother who contributes to the history. Patient states he was at his regularly scheduled  infusion this morning when his blood sugar was noted to be in the 600s. His mother states that for the past week and a half, his BG has been consistently in the 300s. Patient states he has not drank ETOH since April 7, 2022. He feels he traded ETOH for sugar and endorses consuming a lot more sugar recently. His mother states he recently has been consuming a lot of  fruit, jello, waffles with syrup, milk, Arnold Ocampo teas, and Dairy Berry. Patient's mother states patient is not on mealtime insulin as he does not consume regularly scheduled meals but rather prefers to eat small meals about 5-6 times daily. She states patient becomes irritable and lethargic if his BG is less than 150. Patient's mother monitors and administers his insulin of 42 units of Lantus daily.  She notes patient has been having auditory and visual hallucinations for about the past week. She states this is not his baseline. Patient denies recent ETOH or drug usage. Patient and mother agree his jaundice color today is his baseline. Patient denies chest pain, SOB, abdominal pain. Patient endorses frequent urination, denies pain, burning or blood tinged urine.      Of note, patient's mother states patient recently had an optometry appointment and was noted to have retinopathy for which he will be following up with opthalmology in the future.     Past Medical History     Past Medical History:  No date: Diabetes (H)   Alcohol dependence   Alcoholic cirrhosis  ASD  Insulin dependent T2DM  Esophageal varices s/p banding     Surgical History     Past Surgical History:   Procedure Laterality Date     ESOPHAGOSCOPY, GASTROSCOPY, DUODENOSCOPY (EGD), COMBINED N/A 5/24/2022    Procedure: ESOPHAGOGASTRODUODENOSCOPY (EGD) with esophageal banding;  Surgeon: Gary Hurst MD;  Location: [facility] OR     ESOPHAGOSCOPY, GASTROSCOPY, DUODENOSCOPY (EGD), COMBINED N/A 6/20/2022    Procedure: ESOPHAGOGASTRODUODENOSCOPY;  Surgeon: Gavino Reza,  MD;  Location: [facility] Main OR     MIDLINE DOUBLE LUMEN PLACEMENT  5/26/2022          PICC TRIPLE LUMEN PLACEMENT  7/28/2022          Family History    Reviewed, and   Family History   Problem Relation Age of Onset     Substance Abuse Mother      Substance Abuse Father      Substance Abuse Maternal Grandfather       Social History      Social History     Tobacco Use     Smoking status: Former Smoker     Smokeless tobacco: Never Used   Vaping Use     Vaping Use: Former   Substance Use Topics     Alcohol use: Not Currently     Drug use: Not Currently     Types: Marijuana      Alcohol: sober since April 7, 2022  Allergies   No Known Allergies  Prior to Admission Medications      Prior to Admission Medications   Prescriptions Last Dose Informant Patient Reported? Taking?   FLUoxetine (PROZAC) 40 MG capsule 7/27/2022 at HS  Yes Yes   Sig: Take 40 mg by mouth At Bedtime   alcohol swab prep pads   No Yes   Sig: Use to swab area of injection/marsha as directed.   blood glucose (NO BRAND SPECIFIED) test strip   No Yes   Sig: Use to test blood sugar 4 times daily or as directed. To accompany: Blood Glucose Monitor Brands: per insurance.   blood glucose monitoring (NO BRAND SPECIFIED) meter device kit   No Yes   Sig: Use to test blood sugar 4 times daily or as directed. Preferred blood glucose meter OR supplies to accompany: Blood Glucose Monitor Brands: per insurance.   fluconazole (DIFLUCAN) 150 MG tablet Not Started  Yes Yes   Sig: Take 150 mg by mouth daily as needed Patient to use for 7 days if yeast infection flare up.   folic acid (FOLVITE) 1 MG tablet 7/27/2022 at Unknown time  No Yes   Sig: Take 1 tablet (1 mg) by mouth daily   furosemide (LASIX) 40 MG tablet 7/27/2022 at HS  No Yes   Sig: Take 1 tablet (40 mg) by mouth 2 times daily   gabapentin (NEURONTIN) 100 MG capsule Past Week at Unknown time  Yes Yes   Sig: Take 100 mg by mouth daily as needed   insulin aspart (NOVOLOG FLEXPEN) 100 UNIT/ML pen Not Taking at  Unknown time  No No   Si units tid with meals and 1 unit per 15 unit of carbohydrate counting   Patient not taking: Reported on 2022   insulin glargine (LANTUS SOLOSTAR) 100 UNIT/ML pen 2022 at HS  No Yes   Sig: Inject 45 Units Subcutaneous At Bedtime   Patient taking differently: Inject 42 Units Subcutaneous At Bedtime   insulin pen needle (ULTICARE MICRO) 32G X 4 MM miscellaneous   No Yes   Sig: Use pen needles daily or as directed.   lactulose (CHRONULAC) 10 GM/15ML solution 2022 at PM  No Yes   Sig: Take 30 mLs (20 g) by mouth 4 times daily   Patient taking differently: Take 20 g by mouth 2 times daily   magnesium oxide (MAG-OX) 400 MG tablet Not Taking at Unknown time  No No   Sig: Take 1 tablet (400 mg) by mouth daily   Patient not taking: Reported on 2022   multivitamin, therapeutic (THERA-VIT) TABS tablet 2022 at PM  No Yes   Sig: Take 1 tablet by mouth in the morning.   pantoprazole (PROTONIX) 40 MG EC tablet 2022 at HS  No Yes   Sig: Take 1 tablet (40 mg) by mouth 2 times daily   predniSONE (DELTASONE) 10 MG tablet 2022 at PM  No Yes   Sig: Take 1 tablet (10 mg) by mouth daily   rifaximin (XIFAXAN) 550 MG TABS tablet   No No   Sig: Take 1 tablet (550 mg) by mouth 2 times daily   Patient not taking: No sig reported   spironolactone (ALDACTONE) 100 MG tablet 2022 at PM  Yes Yes   Sig: Take 100 mg by mouth daily   spironolactone (ALDACTONE) 25 MG tablet Not Taking at Unknown time  No No   Sig: Take 1 tablet (25 mg) by mouth 3 times daily   Patient not taking: Reported on 2022   thiamine (B-1) 100 MG tablet 2022 at AM  No Yes   Sig: Take 1 tablet (100 mg) by mouth in the morning.   thin (NO BRAND SPECIFIED) lancets   No Yes   Sig: Use with lanceting device. To accompany: Blood Glucose Monitor Brands: per insurance.      Facility-Administered Medications: None      Review of Systems     A 12 point comprehensive review of systems was negative except as noted  above in HPI.    PHYSICAL EXAMINATION       Vitals      Temp:  [98  F (36.7  C)-99  F (37.2  C)] 99  F (37.2  C)  Pulse:  [103-111] 111  Resp:  [10-19] 18  BP: (139-161)/(58-90) 151/70  SpO2:  [93 %-99 %] 97 %    Examination     GENERAL:  Alert, appears tired, in no acute distress   HEAD:  Normocephalic, without obvious abnormality, atraumatic   EYES:  PERRL, conjunctiva/corneas clear, bilateral scleral icterus, EOM's intact   THROAT: Mucosal bleeding noted, dried blood on outer lips    LUNGS:   Clear to auscultation bilaterally, no rales, rhonchi, or wheezing, symmetric chest rise on inhalation, respirations unlabored   CHEST WALL:  No tenderness or deformity   HEART:  Regular rate and rhythm, 4/6 holosystolic murmur radiating throughout the precordium    ABDOMEN:   Soft, non-tender, distended, no masses, no organomegaly, no rebound or guarding   EXTREMITIES: Extremities normal, atraumatic, no cyanosis or edema    SKIN: Dry to touch, no exanthems in the visualized areas, significant jaundice     NEURO: Alert, oriented x 4, moves all four extremities freely, non-focal   PSYCH: Cooperative, behavior is appropriate      Pertinent Lab     Most Recent 3 CBC's:Recent Labs   Lab Test 07/28/22  1502 07/28/22  1221 07/28/22  1025   WBC 19.9* 19.6* 18.7*   HGB 8.3* 7.2* 6.9*   * 102* 105*   PLT 70* 58* 66*     Most Recent 3 BMP's:Recent Labs   Lab Test 07/28/22  1838 07/28/22  1610 07/28/22  1510 07/28/22  1502 07/28/22  1221 07/28/22  1025   NA  --   --   --  123* 123* 123*   POTASSIUM  --   --   --  6.0* 6.4* 6.2*   CHLORIDE  --   --   --  83* 82* 82*   CO2  --   --   --  33* 31 31   BUN  --   --   --  47* 47* 49*   CR  --   --   --  0.92 0.85 0.88   ANIONGAP  --   --   --  7 10 10   SARTHAK  --   --   --  10.0 9.8 10.0   * 486* 571* 647* 694* 630*     Most Recent 2 LFT's:Recent Labs   Lab Test 07/28/22  1502 07/28/22  1221   AST 86* 80*   ALT 59* 57*   ALKPHOS 303* 300*   BILITOTAL 24.5* 21.7*     Most Recent 3  INR's:Recent Labs   Lab Test 07/28/22  1502 07/28/22  1025 07/09/22  1523   INR 1.90* 1.87* 1.63*         Pertinent Radiology     Radiology Results: No results found for this or any previous visit (from the past 24 hour(s)).  EKG Results: personally reviewed. Sinus tach    Advance Care Planning        HERBERT Hernandez  [facility]   Phone: #[phone]

## 2022-07-28 NOTE — ED PROVIDER NOTES
"EMERGENCY DEPARTMENT ENCOUNTER      NAME: Dillon Salter  AGE: 26 year old male  YOB: 1995  MRN: [id number]  EVALUATION DATE & TIME: No admission date for patient encounter.    PCP: Gary Rodrigues    ED PROVIDER: Karey West M.D.      Chief Complaint   Patient presents with     Hyperglycemia         FINAL IMPRESSION:  1. Hyperkalemia    2. Hyperglycemia    3. Hyponatremia    4. SIRS (systemic inflammatory response syndrome) (H)          ED COURSE & MEDICAL DECISION MAKING:    ED Course as of 07/28/22 1733   Thu Jul 28, 2022   1432 Pt with slower speech but ASD history, but with tremor and baseline jaundice, hard to know if this is typical affecr or hepatic encephalopathy but mother at bedside notes this degree of jaundice is his baseline and no AMS thus more likely aSD. Patient reassuringly sober since April 2022, with h/o multiple episodes of banding of esophageal varices, not on transplant list, no h/o SBP or esophageal varices and doesn't undergo paracentesis. Awaiting screening labs here with calcium/bicarb and IVF ordered in setting of known hyperkalemia overall worsening and IVF, LFTs and he does follow with [facility] GI /hepatology with planned upcoming appointment next month also. Mother concerned as he eats very little and mostly \"sweets\" and thus is not taking mealtime short acting insulin and with glucose in upper 300s for over a week, much higher today. Review of today's labs form infusion center reveals no signs of DKA reassuringly and with CO2 elevated and anion gap normal with hyperglycemia, and no signs of HHS as no AMS per family, insulin/calcium begun pending EKG and SQ insulin begun, will recheck labs here with COVID19 PCR and patient and mother amenable to plan to admit for hyperglycemia management, start of short acting insulin, discussion re: diet, ammonia pending to trend, and blood cultures / procalcitonin/UA to assess for signs of infection with worsening function. He was " transfused at [facility] infusion center for chronic anemia attributed to his cirrhosis today prior to ED presentation   1628 CO2 33 and anion gap 7 without DKA reassuringly, ammonia 72 and can be trended, hemoglobin 8.3 after transfusion (improved from 6 range days ago per chart review), WBC 19 and with procalcitonin elevated and WBC elevated, infection possible, no source seen with SIRS, blood cultures pending. Wiht K 6.0, hyperkalemia protocol begun and will recheck FSG and check trend after insulin administration.   1631 Repeat , will give further insulin. Dex/insulin IV ordered for hyperkalemia specifically and SQ insulin for hyperglycemia, I spoke with RN Zuleika re: orders   1633 MELD score for patient 32 with estimated 52.6% estimated 3 month mortality   1703 Pt admitted to hospitalist Tamie to cardiac tele, aware SIRS but no abx begun, he will f/u blood cultures, will observe and begin abx if he spikes fever, will continue surveillance for source.   1730 I spoke with Dr Acosta from [facility] GI who will discuss with his hepatologist and have hepatology reach out to paitent and family tommorrow re situation       2:21 PM I met with the patient, obtained history, performed an initial exam, and discussed options and plan for diagnostics and treatment here in the ED. PPE worn including surgical mask, surgical gloves.  4:58 PM I spoke with the hospitalist Dr. Willett who accepts the patient for admission.    Pertinent Labs & Imaging studies reviewed. (See chart for details)    Critical Care time was 45 minutes for this patient excluding procedures.      At the conclusion of the encounter I discussed the results of all of the tests and the disposition. The questions were answered. The patient or family acknowledged understanding and was agreeable with the care plan.     MEDICATIONS GIVEN IN THE EMERGENCY:  Medications   glucose gel 15-30 g (has no administration in time range)     Or   dextrose 50 % injection 25-50 mL  "(has no administration in time range)     Or   glucagon injection 1 mg (has no administration in time range)   lidocaine 1 % 0.1-5 mL (2 mLs Other Given 7/28/22 1555)   sodium chloride (PF) 0.9% PF flush 10-40 mL (has no administration in time range)   sodium chloride (PF) 0.9% PF flush 10-20 mL (has no administration in time range)   sodium chloride (PF) 0.9% PF flush 10-40 mL (has no administration in time range)   sodium chloride (PF) 0.9% PF flush 10-40 mL (has no administration in time range)   insulin regular 1 unit/mL injection 5.67 Units (has no administration in time range)   calcium gluconate 10 % injection 1 g (0 g Intravenous Stopped 7/28/22 1628)   sodium bicarbonate 8.4 % injection 50 mEq (50 mEq Intravenous Given 7/28/22 1530)   insulin aspart (NovoLOG) injection (RAPID ACTING) (15 Units Subcutaneous Given 7/28/22 1526)   0.9% sodium chloride BOLUS (0 mLs Intravenous Stopped 7/28/22 1628)   insulin aspart (NovoLOG) injection (RAPID ACTING) (15 Units Subcutaneous Given 7/28/22 1718)       NEW PRESCRIPTIONS STARTED AT TODAY'S ER VISIT  New Prescriptions    No medications on file          =================================================================    HPI      Dillon Salter is a 26 year old male with PMHx of alcoholic cirrhosis, autism spectrum disorder, insulin dependent diabetes mellitus type 2 who presents to the ED today via walk in with hs mother with abnormal labs. Patient reports from the cancer center for primarily for a glucose in the 600's. Patient endorses blood sugars in the upper 300's for the past week. Medically, patient reports he is feeling fine, but is fatigued. Patient's mother states the patient is on daily 42 units of Lantus insulin in the evening, but does not take insulin with his meals. Patient's mother notes the patient has a \"sporradic\" appetite and tends to eat 5-6 small meals per day.     Patient endorses recently bleeding gums and states he had an episode of epistaxis " earlier today.     Patient's mother states the patient's current jaundice is at baseline. Patient reports he has been sober since April 7. Patient has reportedly not been told he needs a liver transplant, but has seen liver specialists with the [facility]. Patient states he has an upcoming appointment with his liver doctor.    Patient states he has a history of esophageal varices with s/p banding. Denies a prior history of SBP or hepatic encephalopathy. Denies history of prior paracentesis. Denies fevers, abdominal pain, black or bloody stools, hematuria.    Per chart review, the patient was having an infusion therapy visit earlier today. BMP with a glucose of 694, a sodium of 123, a potassium of 6.4. Hepatic function panel with a Tbili of 21.7 up from prior of 13.9 3 weeks ago. INR 1.87. Hgb of 6.9. patient was transfused 1 unit of blood then sent to the ED.    REVIEW OF SYSTEMS   All other systems reviewed and are negative except as noted above in HPI.    PAST MEDICAL HISTORY:  Past Medical History:   Diagnosis Date     Diabetes (H)        PAST SURGICAL HISTORY:  Past Surgical History:   Procedure Laterality Date     ESOPHAGOSCOPY, GASTROSCOPY, DUODENOSCOPY (EGD), COMBINED N/A 5/24/2022    Procedure: ESOPHAGOGASTRODUODENOSCOPY (EGD) with esophageal banding;  Surgeon: Gary Hurst MD;  Location: [facility] OR     ESOPHAGOSCOPY, GASTROSCOPY, DUODENOSCOPY (EGD), COMBINED N/A 6/20/2022    Procedure: ESOPHAGOGASTRODUODENOSCOPY;  Surgeon: Gavino Reza MD;  Location: [facility] OR     MIDLINE DOUBLE LUMEN PLACEMENT  5/26/2022          PICC TRIPLE LUMEN PLACEMENT  7/28/2022            CURRENT MEDICATIONS:    alcohol swab prep pads  blood glucose (NO BRAND SPECIFIED) test strip  blood glucose monitoring (NO BRAND SPECIFIED) meter device kit  fluconazole (DIFLUCAN) 150 MG tablet  FLUoxetine (PROZAC) 40 MG capsule  folic acid (FOLVITE) 1 MG tablet  furosemide (LASIX) 40 MG tablet  gabapentin  (NEURONTIN) 100 MG capsule  insulin glargine (LANTUS SOLOSTAR) 100 UNIT/ML pen  insulin pen needle (ULTICARE MICRO) 32G X 4 MM miscellaneous  lactulose (CHRONULAC) 10 GM/15ML solution  multivitamin, therapeutic (THERA-VIT) TABS tablet  pantoprazole (PROTONIX) 40 MG EC tablet  predniSONE (DELTASONE) 10 MG tablet  spironolactone (ALDACTONE) 100 MG tablet  thiamine (B-1) 100 MG tablet  thin (NO BRAND SPECIFIED) lancets  insulin aspart (NOVOLOG FLEXPEN) 100 UNIT/ML pen  magnesium oxide (MAG-OX) 400 MG tablet  rifaximin (XIFAXAN) 550 MG TABS tablet  spironolactone (ALDACTONE) 25 MG tablet        ALLERGIES:  No Known Allergies    FAMILY HISTORY:  Family History   Problem Relation Age of Onset     Substance Abuse Mother      Substance Abuse Father      Substance Abuse Maternal Grandfather        SOCIAL HISTORY:   Social History     Socioeconomic History     Marital status: Single   Tobacco Use     Smoking status: Former Smoker     Smokeless tobacco: Never Used   Vaping Use     Vaping Use: Former   Substance and Sexual Activity     Alcohol use: Not Currently     Drug use: Not Currently     Types: Marijuana   Social History Narrative    26-year-old history of autism/Asperger's on the spectrum graduated high school at [employer] worked at [employer] and then another  place until the Covid epidemic in 2020 lives with parents (Leticia and Wes) socially isolated drinks alcohol beer daily        End-stage cirrhosis noted on admission to  April 2022       VITALS:  Patient Vitals for the past 24 hrs:   BP Temp Temp src Pulse Resp SpO2 Weight   07/28/22 1500 (!) 147/80 -- -- 109 10 95 % --   07/28/22 1318 (!) 146/58 99  F (37.2  C) Oral 107 16 99 % 56.7 kg (125 lb)       PHYSICAL EXAM    GENERAL: Awake, alert.  In no acute distress.   HEENT: Normocephalic, atraumatic.  Pupils equal, round and reactive.  Conjunctiva jaundiced.  EOMI.  NECK: No stridor or apparent deformity.  PULMONARY: Symmetrical breath sounds without distress.   Lungs clear to auscultation bilaterally without wheezes, rhonchi or rales.  CARDIO: Regular rate and rhythm.  No significant murmur, rub or gallop.  Radial pulses strong and symmetrical.  ABDOMINAL: Abdomen soft, distended, but non-tender to palpation.  No CVAT, no palpable hepatosplenomegaly.  EXTREMITIES: No lower extremity swelling or edema.    NEURO: Alert and oriented to person, place and time.  Cranial nerves grossly intact.  No focal motor deficit. Moderately fine tremor to upper extremities.  PSYCH: Normal mood and affect. Slow speech  SKIN: Jaundiced. No rashes      LAB:  All pertinent labs reviewed and interpreted.  Results for orders placed or performed during the hospital encounter of 07/28/22   Comprehensive metabolic panel   Result Value Ref Range    Sodium 123 (L) 136 - 145 mmol/L    Potassium 6.0 (H) 3.5 - 5.0 mmol/L    Chloride 83 (L) 98 - 107 mmol/L    Carbon Dioxide (CO2) 33 (H) 22 - 31 mmol/L    Anion Gap 7 5 - 18 mmol/L    Urea Nitrogen 47 (H) 8 - 22 mg/dL    Creatinine 0.92 0.70 - 1.30 mg/dL    Calcium 10.0 8.5 - 10.5 mg/dL    Glucose 647 (HH) 70 - 125 mg/dL    Alkaline Phosphatase 303 (H) 45 - 120 U/L    AST 86 (H) 0 - 40 U/L    ALT 59 (H) 0 - 45 U/L    Protein Total 8.1 (H) 6.0 - 8.0 g/dL    Albumin 3.4 (L) 3.5 - 5.0 g/dL    Bilirubin Total 24.5 (HH) 0.0 - 1.0 mg/dL    GFR Estimate >90 >60 mL/min/1.73m2   Result Value Ref Range    INR 1.90 (H) 0.85 - 1.15   Result Value Ref Range    Lipase 35 0 - 52 U/L   Result Value Ref Range    Magnesium 1.9 1.8 - 2.6 mg/dL   Ethyl Alcohol Level   Result Value Ref Range    Alcohol, Blood <10 None detected mg/dL   Result Value Ref Range    Ammonia 72 (H) 11 - 35 umol/L   Blood gas venous   Result Value Ref Range    pH Venous 7.43 7.35 - 7.45    pCO2 Venous 57 (H) 35 - 50 mm Hg    pO2 Venous 36 25 - 47 mm Hg    Bicarbonate Venous 35 (H) 24 - 30 mmol/L    Base Excess/Deficit (+/-) 13.3   mmol/L    Oxyhemoglobin Venous 64.6 (L) 70.0 - 75.0 %    O2 Sat, Venous  68.6 (L) 70.0 - 75.0 %   Asymptomatic COVID-19 Virus (Coronavirus) by PCR Nasopharyngeal    Specimen: Nasopharyngeal; Swab   Result Value Ref Range    SARS CoV2 PCR Negative Negative   UA with Microscopic reflex to Culture    Specimen: Urine, Midstream   Result Value Ref Range    Color Urine Yellow Colorless, Straw, Light Yellow, Yellow    Appearance Urine Clear Clear    Glucose Urine >1000 (A) Negative mg/dL    Bilirubin Urine Negative Negative    Ketones Urine Negative Negative mg/dL    Specific Gravity Urine 1.025 1.001 - 1.030    Blood Urine Negative Negative    pH Urine 6.5 5.0 - 7.0    Protein Albumin Urine Negative Negative mg/dL    Urobilinogen Urine 12.0 (A) <2.0 mg/dL    Nitrite Urine Negative Negative    Leukocyte Esterase Urine Negative Negative    RBC Urine 1 <=2 /HPF    WBC Urine <1 <=5 /HPF    Squamous Epithelials Urine <1 <=1 /HPF   Result Value Ref Range    Procalcitonin 1.53 (H) 0.00 - 0.49 ng/mL   Result Value Ref Range    Hemoglobin A1C 5.4 <=5.6 %   CBC with platelets and differential   Result Value Ref Range    WBC Count 19.9 (H) 4.0 - 11.0 10e3/uL    RBC Count 2.40 (L) 4.40 - 5.90 10e6/uL    Hemoglobin 8.3 (L) 13.3 - 17.7 g/dL    Hematocrit 24.2 (L) 40.0 - 53.0 %     (H) 78 - 100 fL    MCH 34.6 (H) 26.5 - 33.0 pg    MCHC 34.3 31.5 - 36.5 g/dL    RDW 22.5 (H) 10.0 - 15.0 %    Platelet Count 70 (L) 150 - 450 10e3/uL   Drugs of Abuse 1+ Panel, Urine ([facility])   Result Value Ref Range    Amphetamines Urine Screen Negative Screen Negative    Benzodiazepines Urine Screen Negative Screen Negative    Opiates Urine Screen Negative Screen Negative    PCP Urine Screen Negative Screen Negative    Cannabinoids Urine Screen Negative Screen Negative    Barbiturates Urine Screen Negative Screen Negative    Cocaine Urine Screen Negative Screen Negative    Methadone Urine Screen Negative Screen Negative    Oxycodone Urine Screen Negative Screen Negative    Creatinine Urine mg/dL 19 mg/dL   Glucose  by meter   Result Value Ref Range    GLUCOSE BY METER POCT 571 (HH) 70 - 99 mg/dL   Manual Differential   Result Value Ref Range    % Neutrophils 73 %    % Lymphocytes 23 %    % Monocytes 3 %    % Eosinophils 0 %    % Basophils 1 %    Absolute Neutrophils 14.5 (H) 1.6 - 8.3 10e3/uL    Absolute Lymphocytes 4.6 0.8 - 5.3 10e3/uL    Absolute Monocytes 0.6 0.0 - 1.3 10e3/uL    Absolute Eosinophils 0.0 0.0 - 0.7 10e3/uL    Absolute Basophils 0.2 0.0 - 0.2 10e3/uL    RBC Morphology Confirmed RBC Indices     Platelet Assessment  Automated Count Confirmed. Platelet morphology is normal.     Automated Count Confirmed. Platelet morphology is normal.    Acanthocytes Moderate (A) None Seen   Glucose by meter   Result Value Ref Range    GLUCOSE BY METER POCT 486 (H) 70 - 99 mg/dL       RADIOLOGY:  Reviewed all pertinent imaging. Please see official radiology report.  No orders to display         EKG:    Reviewed and interpreted as: 1339 sinus tachycardia  without ST changes other than peaked T waves V2/V3, morphologically similar to prior EKG from June 18, 2022      I have independently reviewed and interpreted the EKG(s) documented above.        I, Bk Velasquez, am serving as a scribe to document services personally performed by Dr. Karey West based on my observation and the provider's statements to me. I, Karey West MD attest that Bk Velasquez is acting in a scribe capacity, has observed my performance of the services and has documented them in accordance with my direction.     Karey West MD  07/28/22 2158

## 2022-07-28 NOTE — ED NOTES
Patient here from infusion, was receiving one unit of PRBC for hempglobin of 6.5 PTA, patient skin and eyes are jaundiced, anorexic and distended abdomen, denies pain and denies SOB.  Mother at the bedside.

## 2022-07-28 NOTE — PROCEDURES
"PICC Line Insertion Procedure Note  Pt. Name: Dillon Salter  MRN:        [id number]    Procedure: Insertion of a  triple Lumen  5 fr  Bard SOLO (valved) Power PICC, Lot number [id number]    Indications: access/medications/labs    Contraindications : none    Procedure Details   Patient identified with 2 identifiers and \"Time Out\" conducted.  .     Central line insertion bundle followed: hand hygeine performed prior to procedure, site cleansed with cholraprep, hat, mask, sterile gloves,sterile gown worn, patient draped with maximum barrier head to toe drape, sterile field maintained.    The vein was assessed and found to be compressible and of adequate size. 2 ml 1% Lidocaine administered sq to the insertion site. A 5 Fr PICC was inserted into the BASILIC vein of the left arm with ultrasound guidance. 1 attempt(s) required to access vein.   Catheter threaded without difficulty. Good blood return noted.    Modified Seldinger Technique used for insertion.    The 8 sharps that are included in the PICC insertion kit were accounted for and disposed of in the sharps container prior to breakdown of the sterile field.    Catheter secured with Statlock, biopatch and Tegaderm dressing applied.    Findings:  Total catheter length  39 cm, with 4 cm exposed. Mid upper arm circumference is 19.5 cm. Catheter was flushed with 30 cc NS. Patient  tolerated procedure well.    Tip placement verified by [employer] . Tip placement in the SVC.      CLABSI prevention brochure left at bedside.    Patient's primary RN notified PICC is ready for use.    Comments:          Caren Marcum RN  [facility] Access        "

## 2022-07-28 NOTE — ED NOTES
Expected Patient Referral to ED  11:54 AM    Referring Clinic/Provider:  Hematology at     Reason for referral/Clinical facts:  History of alcohol cirhosiis and gets blood transfusions sometimes, is getting transfusion at [facility] infusion center now. Labs came back and he has significantlyabnormal electrolytes.  Is going to need to be evaluated.      Recommendations provided:  Send to ED for further evaluation    Caller was informed that this institution does possess the capabilities and/or resources to provide for patient and should be transferred to our facility.    Discussed that if direct admit is sought and any hurdles are encountered, this ED would be happy to see the patient and evaluate.    Informed caller that recommendations provided are recommendations based only on the facts provided and that they responsible to accept or reject the advice, or to seek a formal in person consultation as needed and that this ED will see/treat patient should they arrive.      Toney Ewing MD  Emergency Medicine  [facility] EMERGENCY ROOM  [address] [place] [id number]  [phone]     Toney Ewing MD  07/28/22 0378

## 2022-07-28 NOTE — PROGRESS NOTES
Infusion Nursing Note:  Dillon Salter presents today for labs and poss blood.    Patient seen by provider today: No   present during visit today: Not Applicable.    Note: pt labs obtained and several criticals came back including blood sugar of 630, bili of 22.9,hgb 6.9,  potassium 6.2. I paged Dr Walters who ordered the labs, she wants pt to go to the ED after his unit of blood and she called the ED to alert them to his arrival. Pt mother here with him. Dr Walters wants the ED to manage the blood sugar. Pt has only lantus insulin. Dr Walters called me back to have labs redrawn so Harriet from clinic giancarlo from pt left arm , blood running in right arm.  Pt taken to the ED at 1315. critial labs given to the ED nurse.  Intravenous Access:  Peripheral IV placed.    Treatment Conditions:  See labs.  New labs  Potassium 6.4, glucose 694 and bili 21.7 given to ED nurse Erick  Post Infusion Assessment:  Patient tolerated transfusion without incident.     Discharge Plan:   Departure Mode: Wheelchair to ED.      Maddie Ocampo, RN

## 2022-07-29 ENCOUNTER — APPOINTMENT (OUTPATIENT)
Dept: CARDIOLOGY | Facility: CLINIC | Age: 27
DRG: 638 | End: 2022-07-29
Attending: INTERNAL MEDICINE
Payer: COMMERCIAL

## 2022-07-29 LAB
ANION GAP SERPL CALCULATED.3IONS-SCNC: 11 MMOL/L (ref 5–18)
ANION GAP SERPL CALCULATED.3IONS-SCNC: 9 MMOL/L (ref 5–18)
BUN SERPL-MCNC: 40 MG/DL (ref 8–22)
BUN SERPL-MCNC: 43 MG/DL (ref 8–22)
BUN SERPL-MCNC: 44 MG/DL (ref 8–22)
BUN SERPL-MCNC: 45 MG/DL (ref 8–22)
CALCIUM SERPL-MCNC: 9.1 MG/DL (ref 8.5–10.5)
CALCIUM SERPL-MCNC: 9.1 MG/DL (ref 8.5–10.5)
CALCIUM SERPL-MCNC: 9.2 MG/DL (ref 8.5–10.5)
CALCIUM SERPL-MCNC: 9.3 MG/DL (ref 8.5–10.5)
CHLORIDE BLD-SCNC: 87 MMOL/L (ref 98–107)
CHLORIDE BLD-SCNC: 89 MMOL/L (ref 98–107)
CHLORIDE BLD-SCNC: 90 MMOL/L (ref 98–107)
CHLORIDE BLD-SCNC: 91 MMOL/L (ref 98–107)
CO2 SERPL-SCNC: 26 MMOL/L (ref 22–31)
CO2 SERPL-SCNC: 28 MMOL/L (ref 22–31)
CO2 SERPL-SCNC: 28 MMOL/L (ref 22–31)
CO2 SERPL-SCNC: 31 MMOL/L (ref 22–31)
CREAT SERPL-MCNC: 0.71 MG/DL (ref 0.7–1.3)
CREAT SERPL-MCNC: 0.74 MG/DL (ref 0.7–1.3)
CREAT SERPL-MCNC: 0.78 MG/DL (ref 0.7–1.3)
CREAT SERPL-MCNC: 0.8 MG/DL (ref 0.7–1.3)
ERYTHROCYTE [DISTWIDTH] IN BLOOD BY AUTOMATED COUNT: ABNORMAL %
ERYTHROCYTE [DISTWIDTH] IN BLOOD BY AUTOMATED COUNT: ABNORMAL %
GFR SERPL CREATININE-BSD FRML MDRD: >90 ML/MIN/1.73M2
GLUCOSE BLD-MCNC: 367 MG/DL (ref 70–125)
GLUCOSE BLD-MCNC: 396 MG/DL (ref 70–125)
GLUCOSE BLD-MCNC: 512 MG/DL (ref 70–125)
GLUCOSE BLD-MCNC: 555 MG/DL (ref 70–125)
GLUCOSE BLDC GLUCOMTR-MCNC: 202 MG/DL (ref 70–99)
GLUCOSE BLDC GLUCOMTR-MCNC: 229 MG/DL (ref 70–99)
GLUCOSE BLDC GLUCOMTR-MCNC: 267 MG/DL (ref 70–99)
GLUCOSE BLDC GLUCOMTR-MCNC: 321 MG/DL (ref 70–99)
GLUCOSE BLDC GLUCOMTR-MCNC: 407 MG/DL (ref 70–99)
GLUCOSE BLDC GLUCOMTR-MCNC: 446 MG/DL (ref 70–99)
GLUCOSE BLDC GLUCOMTR-MCNC: 454 MG/DL (ref 70–99)
GLUCOSE BLDC GLUCOMTR-MCNC: 458 MG/DL (ref 70–99)
GLUCOSE BLDC GLUCOMTR-MCNC: 515 MG/DL (ref 70–99)
GLUCOSE BLDC GLUCOMTR-MCNC: 518 MG/DL (ref 70–99)
GLUCOSE BLDC GLUCOMTR-MCNC: 564 MG/DL (ref 70–99)
HCT VFR BLD AUTO: 20.3 % (ref 40–53)
HCT VFR BLD AUTO: 20.3 % (ref 40–53)
HGB BLD-MCNC: 7 G/DL (ref 13.3–17.7)
HGB BLD-MCNC: 7 G/DL (ref 13.3–17.7)
LACTATE SERPL-SCNC: 1.2 MMOL/L (ref 0.7–2)
LVEF ECHO: NORMAL
MCH RBC QN AUTO: 34.7 PG (ref 26.5–33)
MCH RBC QN AUTO: 34.8 PG (ref 26.5–33)
MCHC RBC AUTO-ENTMCNC: 34.5 G/DL (ref 31.5–36.5)
MCHC RBC AUTO-ENTMCNC: 34.5 G/DL (ref 31.5–36.5)
MCV RBC AUTO: 101 FL (ref 78–100)
MCV RBC AUTO: 101 FL (ref 78–100)
PLATELET # BLD AUTO: 54 10E3/UL (ref 150–450)
PLATELET # BLD AUTO: 54 10E3/UL (ref 150–450)
POTASSIUM BLD-SCNC: 4.9 MMOL/L (ref 3.5–5)
POTASSIUM BLD-SCNC: 5.5 MMOL/L (ref 3.5–5)
POTASSIUM BLD-SCNC: 5.8 MMOL/L (ref 3.5–5)
POTASSIUM BLD-SCNC: 6.2 MMOL/L (ref 3.5–5)
RBC # BLD AUTO: 2.01 10E6/UL (ref 4.4–5.9)
RBC # BLD AUTO: 2.02 10E6/UL (ref 4.4–5.9)
SODIUM SERPL-SCNC: 127 MMOL/L (ref 136–145)
SODIUM SERPL-SCNC: 128 MMOL/L (ref 136–145)
SODIUM SERPL-SCNC: 128 MMOL/L (ref 136–145)
SODIUM SERPL-SCNC: 129 MMOL/L (ref 136–145)
WBC # BLD AUTO: 16.9 10E3/UL (ref 4–11)
WBC # BLD AUTO: 17 10E3/UL (ref 4–11)

## 2022-07-29 PROCEDURE — 258N000003 HC RX IP 258 OP 636: Performed by: INTERNAL MEDICINE

## 2022-07-29 PROCEDURE — 80048 BASIC METABOLIC PNL TOTAL CA: CPT | Performed by: INTERNAL MEDICINE

## 2022-07-29 PROCEDURE — 99232 SBSQ HOSP IP/OBS MODERATE 35: CPT | Performed by: FAMILY MEDICINE

## 2022-07-29 PROCEDURE — 99222 1ST HOSP IP/OBS MODERATE 55: CPT | Performed by: PHYSICIAN ASSISTANT

## 2022-07-29 PROCEDURE — 210N000002 HC R&B HEART CARE

## 2022-07-29 PROCEDURE — 255N000002 HC RX 255 OP 636: Performed by: INTERNAL MEDICINE

## 2022-07-29 PROCEDURE — 80048 BASIC METABOLIC PNL TOTAL CA: CPT | Performed by: FAMILY MEDICINE

## 2022-07-29 PROCEDURE — 250N000012 HC RX MED GY IP 250 OP 636 PS 637: Performed by: INTERNAL MEDICINE

## 2022-07-29 PROCEDURE — 85027 COMPLETE CBC AUTOMATED: CPT | Performed by: INTERNAL MEDICINE

## 2022-07-29 PROCEDURE — 93306 TTE W/DOPPLER COMPLETE: CPT | Mod: 26 | Performed by: INTERNAL MEDICINE

## 2022-07-29 PROCEDURE — 85014 HEMATOCRIT: CPT | Performed by: FAMILY MEDICINE

## 2022-07-29 PROCEDURE — 250N000013 HC RX MED GY IP 250 OP 250 PS 637: Performed by: INTERNAL MEDICINE

## 2022-07-29 PROCEDURE — 258N000003 HC RX IP 258 OP 636: Performed by: FAMILY MEDICINE

## 2022-07-29 PROCEDURE — 999N000208 ECHOCARDIOGRAM COMPLETE

## 2022-07-29 PROCEDURE — 250N000012 HC RX MED GY IP 250 OP 636 PS 637: Performed by: FAMILY MEDICINE

## 2022-07-29 PROCEDURE — 83605 ASSAY OF LACTIC ACID: CPT | Performed by: FAMILY MEDICINE

## 2022-07-29 PROCEDURE — 250N000011 HC RX IP 250 OP 636: Performed by: INTERNAL MEDICINE

## 2022-07-29 RX ORDER — PREDNISONE 5 MG/1
10 TABLET ORAL
Status: DISCONTINUED | OUTPATIENT
Start: 2022-07-29 | End: 2022-08-01

## 2022-07-29 RX ADMIN — PANTOPRAZOLE SODIUM 40 MG: 20 TABLET, DELAYED RELEASE ORAL at 08:17

## 2022-07-29 RX ADMIN — VANCOMYCIN HYDROCHLORIDE 1000 MG: 5 INJECTION, POWDER, LYOPHILIZED, FOR SOLUTION INTRAVENOUS at 11:52

## 2022-07-29 RX ADMIN — THERA TABS 1 TABLET: TAB at 08:17

## 2022-07-29 RX ADMIN — SODIUM CHLORIDE 300 ML: 9 INJECTION, SOLUTION INTRAVENOUS at 05:35

## 2022-07-29 RX ADMIN — LACTULOSE 20 G: 10 SOLUTION ORAL at 08:17

## 2022-07-29 RX ADMIN — VANCOMYCIN HYDROCHLORIDE 1000 MG: 5 INJECTION, POWDER, LYOPHILIZED, FOR SOLUTION INTRAVENOUS at 21:55

## 2022-07-29 RX ADMIN — Medication 100 MG: at 08:17

## 2022-07-29 RX ADMIN — FOLIC ACID 1 MG: 1 TABLET ORAL at 08:17

## 2022-07-29 RX ADMIN — RIFAXIMIN 550 MG: 550 TABLET ORAL at 21:54

## 2022-07-29 RX ADMIN — PANTOPRAZOLE SODIUM 40 MG: 20 TABLET, DELAYED RELEASE ORAL at 21:54

## 2022-07-29 RX ADMIN — PERFLUTREN 3 ML: 6.52 INJECTION, SUSPENSION INTRAVENOUS at 09:05

## 2022-07-29 RX ADMIN — SODIUM CHLORIDE 1000 ML: 9 INJECTION, SOLUTION INTRAVENOUS at 10:11

## 2022-07-29 RX ADMIN — FLUOXETINE 40 MG: 20 CAPSULE ORAL at 21:53

## 2022-07-29 RX ADMIN — FUROSEMIDE 40 MG: 40 TABLET ORAL at 21:54

## 2022-07-29 RX ADMIN — INSULIN ASPART 7 UNITS: 100 INJECTION, SOLUTION INTRAVENOUS; SUBCUTANEOUS at 08:17

## 2022-07-29 RX ADMIN — PREDNISONE 10 MG: 5 TABLET ORAL at 17:56

## 2022-07-29 RX ADMIN — INSULIN HUMAN 5 UNITS: 100 INJECTION, SUSPENSION SUBCUTANEOUS at 20:26

## 2022-07-29 RX ADMIN — Medication 400 MG: at 08:17

## 2022-07-29 RX ADMIN — FUROSEMIDE 40 MG: 40 TABLET ORAL at 06:50

## 2022-07-29 ASSESSMENT — ACTIVITIES OF DAILY LIVING (ADL)
ADLS_ACUITY_SCORE: 20
ADLS_ACUITY_SCORE: 24
ADLS_ACUITY_SCORE: 20

## 2022-07-29 NOTE — UTILIZATION REVIEW
Admission Status; Secondary Review Determination   Under the authority of the Utilization Management Committee, the utilization review process indicated a secondary review on Dillon Salter. The review outcome is based on review of the medical records, discussions with staff, and applying clinical experience noted on the date of the review.   (x) Inpatient Status Appropriate - This patient's medical care is consistent with medical management for inpatient care and reasonable inpatient medical practice.     RATIONALE FOR DETERMINATION   26 yr old male with alcohol use disorder with cirrhosis, esophageal varices s/p banding, ASD, DM presented for abnormal labs on 7/28/22.  Labs noted abnormal after routine scheduled pRBC infusion.  Tachycardia, WBC 19K.  Potassium 6.0.  Glucose 694.  Noncompliance with insulin.  Treating glucoses and did decrease to [id number] with improvement in potassium but this AM Glucoses have been 400-500 overnight and potassium back up at 5.8.  Renal consultation.  IVF.  Needs work on glucose control as likely etiology of hyperkalemia.  Still tachycardic.  HGB dropped to 7.0 after IVF.  New 4/6 holosystolic murmur with prior S Aureus bacteremia with concerns for repeat infection and valvular vegetation.  IV vanco and ECHO.    At the time of admission with the information available to the attending physician more than 2 nights Hospital complex care was anticipated, based on patient risk of adverse outcome if treated as outpatient and complex care required. Inpatient admission is appropriate based on the Medicare guidelines.   The information on this document is developed by the utilization review team in order for the business office to ensure compliance. This only denotes the appropriateness of proper admission status and does not reflect the quality of care rendered.   The definitions of Inpatient Status and Observation Status used in making the determination above are those provided in the CMS  Coverage Manual, Chapter 1 and Chapter 6, section 70.4.   Sincerely,   Dahiana Lu MD  Utilization Review  Physician Advisor  [facility]

## 2022-07-29 NOTE — PLAN OF CARE
Goal Outcome Evaluation:      Problem: Plan of Care - These are the overarching goals to be used throughout the patient stay.    Goal: Plan of Care Review/Shift Note  Description: The Plan of Care Review/Shift note should be completed every shift.  The Outcome Evaluation is a brief statement about your assessment that the patient is improving, declining, or no change.  This information will be displayed automatically on your shift note.  Outcome: Ongoing, Progressing     Problem: Hyperglycemia  Goal: Blood Glucose Level Within Targeted Range  Outcome: Ongoing, Progressing     Problem: Electrolyte Imbalance  Goal: Electrolyte Balance  Outcome: Ongoing, Progressing         Patient denies pain.   Blood sugars have been elevated all night; 446, 515 and 518.   A total of 6 units of Novolog given and 5 units of Lantus.  500 ml NS bolus infusing now.   Patient potassium is 6.2 this am as well.   Plan is to recheck BMP at 0800.  Patient alert and oriented.  Vitals stable.  Will continue to monitor blood sugars every 2 hours and treat per MD order.   Patient's mother at bedside.

## 2022-07-29 NOTE — PLAN OF CARE
Patient admitted at 1830 for hyperglycemia and hyperkalemia. BG upon recheck at 1830 was 157. NA notified to check BG at 1930 to ensure BG doesn't continue to drop rapidly.     Sepsis risk alert flagged, Lactic acid ordered.     Patient jaundiced and belly distended. Keshav Kelly present in the room.  Leni Arias RN on 7/28/2022 at 7:38 PM      Problem: Hyperglycemia  Goal: Blood Glucose Level Within Targeted Range  Outcome: Ongoing, Progressing     Problem: Electrolyte Imbalance  Goal: Electrolyte Balance  Outcome: Ongoing, Progressing   Goal Outcome Evaluation:

## 2022-07-29 NOTE — PROGRESS NOTES
Patient called out at 1940 stating that he wasn't feeling well.  Blood glucose 37. Patient oriented, but lethargic.   Eight ounces of apple juice is given to patient.   Recheck is 66.   Additional 8 ounces of juice is given as well at D50, 50 ml.   Recheck is 246.   Blood sugars are checked every hour X 4, 262, 259, 285 and 341.    2 units of Novolog given per HS sliding scale order and Lantus 45 units given.   Will continue to monitor blood glucose every 4 hours.

## 2022-07-29 NOTE — PROVIDER NOTIFICATION
Message sent to evening x-cover at 23:53    306 MF   BG (37) at 1942, corrected w juice and d50, checked BG hourly, continued to rise up to mid 200s so RN gave insulins, but BG most recently 341. Perhaps place order for BG monitoring freq? Please advise  [place] [phone]

## 2022-07-29 NOTE — PHARMACY-VANCOMYCIN DOSING SERVICE
Pharmacy Vancomycin Initial Note  Date of Service 2022  Patient's  1995  26 year old, male    Indication: Bacteremia    Current estimated CrCl = Estimated Creatinine Clearance: 103.3 mL/min (based on SCr of 0.92 mg/dL).    Creatinine for last 3 days  2022: 10:25 AM Creatinine 0.88 mg/dL; 12:21 PM Creatinine 0.85 mg/dL;  3:02 PM Creatinine 0.92 mg/dL    Recent Vancomycin Level(s) for last 3 days  No results found for requested labs within last 72 hours.      Vancomycin IV Administrations (past 72 hours)      No vancomycin orders with administrations in past 72 hours.                Nephrotoxins and other renal medications (From now, onward)    Start     Dose/Rate Route Frequency Ordered Stop    22 0900  furosemide (LASIX) tablet 40 mg        Note to Pharmacy: PTA Sig:Take 1 tablet (40 mg) by mouth 2 times daily      40 mg Oral 2 TIMES DAILY 22 09  vancomycin 1000 mg in 0.9% NaCl 250 mL intermittent infusion 1,000 mg         1,000 mg  over 60 Minutes Intravenous EVERY 12 HOURS 22  vancomycin 1250 mg in 0.9% NaCl 250 mL intermittent infusion 1,250 mg         1,250 mg  over 90 Minutes Intravenous ONCE 22 2000  furosemide (LASIX) injection 40 mg         40 mg  over 1-3 Minutes Intravenous ONCE 22            Contrast Orders - past 72 hours (72h ago, onward)    None          [employer] Prediction of Planned Initial Vancomycin Regimen  Loading dose: 1250 mg at 20:00 2022.  Regimen: 1000 mg IV every 12 hours.  Start time: 20:03 on 2022  Exposure target: AUC24 (range)400-600 mg/L.hr   AUC24,ss: 499 mg/L.hr  Probability of AUC24 > 400: 72 %  Ctrough,ss: 14.6 mg/L  Probability of Ctrough,ss > 20: 27 %  Probability of nephrotoxicity (Lodise MANUEL ): 10 %          Plan:  1. Start vancomycin  1250 mg IV x 1 then 1000mg IV q12h.   2. Vancomycin monitoring method: AUC  3. Vancomycin therapeutic  monitoring goal: 400-600 mg*h/L  4. Pharmacy will check vancomycin levels as appropriate in 1-3 Days.    5. Serum creatinine levels will be ordered daily for the first week of therapy and at least twice weekly for subsequent weeks.      Claudia Cohn RPH

## 2022-07-29 NOTE — PROVIDER NOTIFICATION
9:01 AM Text Paged Dr. Vergara: Critical Blood Glucose of 512. See 8AM BMP, K 5.8. Do you want pt on telemetry with the elevated K? NATE PELLETIER, CONI Callback #[phone]     Several overs placed including telemetry. Dr. Vergara requested RN talk with nephrology about blood sugar levels. RN talked with Jerman Beckwith with nephrology. He states the potassium is due to blood sugar levels and would like hospatalist to address this. Updated Dr. Vergara who placed several new insulin orders. NS IV bolus to be given now with 8 units insulin, recheck BMP and CBC at 11.

## 2022-07-29 NOTE — PROGRESS NOTES
"SPIRITUAL HEALTH SERVICES NOTE  [facility]    SPIRITUAL ASSESSMENT  Feeling overwhelmed with DM management/liver disease  Proud of his sobriety of 3 months  Reports good support around him  Optimistic about a liver transplant  Enjoys music and live concerts    CARE PROVIDED  Empathic listening and presence   Normalized/validated his/her feelings of frustration, concern, guilt, desire and gratitude  Explored/identified sources of support  Prayer shared     SPIRITUAL CARE NOTE  Saw Dillon due to admission screening response. He was eating breakfast and was accompanied by his mother. They shared that although Dillon was diagnosed with DM II 10 years ago, he has been struggling with his blood sugars recently. They believe this is due to his liver disease. Dillon shares that he has been sober for 3 months and that he is really happy about that, but that he feels his new addiction is sugar. We talked about the guilt and weight that he carries with regard to this. Dillon acknowledges that he enjoys living to the fullest and enjoying whatever he is doing - listening to music, watching movies, or eating and often doesn't want to restrict himself. He also notes that in some ways he is very mature, and in other ways, more like a teenager with regards to decisions and choices. He shares that his parents and those closest to him are very supportive and understanding. He states that he wants accountability from others. He is hopeful that someday he might have a liver transplant. For now, Dillon is hoping to get a better handle on his BG levels. Dillon is an avid music/rock fan enjoys going to concerts. He continues to find vinod in his \"vinyls\" and his books. He welcomes my offer of prayer.     Visit Length: 30 minutes    Plan of Care: Will remain available for further support as patient/family needs/desires.    Kelsea Rico M.Div.      Office: [phone] (for non-urgent requests)  Please Vocera or page " through [facility] for time-sensitive requests

## 2022-07-29 NOTE — CONSULTS
RENAL CONSULT NOTE    REQUESTING PHYSICIAN: Dr Posada    REASON FOR CONSULT: Hyperkalemia    ASSESSMENT/PLAN:  Hyperkalemia: Secondary to hyperglycemia.  Serum potassium 5.8 this morning, and blood glucose 512.  Last night his serum potassium was 4.7 when he was not hyperglycemic.  He was given calcium gluconate, sodium bicarbonate, and Lasix for treatment of his hyperkalemia.  PTA he was on Aldactone for his liver disease, but has not had this for couple days.  I would advise no shifting measures in regards to his hyperkalemia, treatment needs to be focused on insulin and getting his blood sugars under better control and this should correct his hyperkalemia.  No indication for dialysis.    Recs:  Insulin per primary team, treatment of hyperglycemia with result in resolution of hyperkalemia  No temporizing measures needed  Thank you for the consult, we will sign off.    Pseudohyponatremia: Secondary to hyperglycemia.  Corrects to be normal.    DM2: Insulin dependence.  Insulin per primary service.     Alcoholic cirrhosis: Reportedly has been sober 3 months.  PTA Aldactone, Lasix, folic acid, thiamine, lactulose, rifaximin.  Aldactone on hold with hyperkalemia.      Depression: Fluoxetine PTA.  Trazodone at bedtime    Thrombocytopenia: Secondary to liver disease          HPI:   Dillon is a 26-year-old male past medical history alcohol use disorder with alcoholic cirrhosis and esophageal varices s/p banding.  He is 3 months sober.  Also with a history of insulin-dependent type 2 diabetes, chronic anemia requiring intermittent transfusions.  Sent to the emergency department after his transfusion due to hyperglycemia and hyperkalemia.  Has not been sleeping well lately, very tired this morning  His mom is with him bedside, she spent the night last night.  We discussed his renal function appears to be normal, we discussed he is at risk for chronic kidney disease down the road given his type 2 diabetes and  suboptimal control  Nephrology consulted for hyperkalemia  His hyperkalemia is secondary to his hyperglycemia, and his hyponatremia is normal when corrected for hyperglycemia  We discussed his glucose needs to be corrected and we should see improvement in his serum potassium        REVIEW OF SYSTEMS:  Complete 12 point review of systems was negative other than those noted in the HPI      Past Medical History:   Diagnosis Date     Diabetes (H)        No current facility-administered medications on file prior to encounter.  alcohol swab prep pads, Use to swab area of injection/marsha as directed.  blood glucose (NO BRAND SPECIFIED) test strip, Use to test blood sugar 4 times daily or as directed. To accompany: Blood Glucose Monitor Brands: per insurance.  blood glucose monitoring (NO BRAND SPECIFIED) meter device kit, Use to test blood sugar 4 times daily or as directed. Preferred blood glucose meter OR supplies to accompany: Blood Glucose Monitor Brands: per insurance.  fluconazole (DIFLUCAN) 150 MG tablet, Take 150 mg by mouth daily as needed Patient to use for 7 days if yeast infection flare up.  FLUoxetine (PROZAC) 40 MG capsule, Take 40 mg by mouth At Bedtime  folic acid (FOLVITE) 1 MG tablet, Take 1 tablet (1 mg) by mouth daily  furosemide (LASIX) 40 MG tablet, Take 1 tablet (40 mg) by mouth 2 times daily  gabapentin (NEURONTIN) 100 MG capsule, Take 100 mg by mouth daily as needed  insulin glargine (LANTUS SOLOSTAR) 100 UNIT/ML pen, Inject 45 Units Subcutaneous At Bedtime (Patient taking differently: Inject 42 Units Subcutaneous At Bedtime)  insulin pen needle (ULTICARE MICRO) 32G X 4 MM miscellaneous, Use pen needles daily or as directed.  lactulose (CHRONULAC) 10 GM/15ML solution, Take 30 mLs (20 g) by mouth 4 times daily (Patient taking differently: Take 20 g by mouth 2 times daily)  multivitamin, therapeutic (THERA-VIT) TABS tablet, Take 1 tablet by mouth in the morning.  pantoprazole (PROTONIX) 40 MG EC  tablet, Take 1 tablet (40 mg) by mouth 2 times daily  predniSONE (DELTASONE) 10 MG tablet, Take 1 tablet (10 mg) by mouth daily  spironolactone (ALDACTONE) 100 MG tablet, Take 100 mg by mouth daily  thiamine (B-1) 100 MG tablet, Take 1 tablet (100 mg) by mouth in the morning.  thin (NO BRAND SPECIFIED) lancets, Use with lanceting device. To accompany: Blood Glucose Monitor Brands: per insurance.  insulin aspart (NOVOLOG FLEXPEN) 100 UNIT/ML pen, 5 units tid with meals and 1 unit per 15 unit of carbohydrate counting (Patient not taking: Reported on 7/28/2022)  magnesium oxide (MAG-OX) 400 MG tablet, Take 1 tablet (400 mg) by mouth daily (Patient not taking: Reported on 7/28/2022)  rifaximin (XIFAXAN) 550 MG TABS tablet, Take 1 tablet (550 mg) by mouth 2 times daily (Patient not taking: No sig reported)  spironolactone (ALDACTONE) 25 MG tablet, Take 1 tablet (25 mg) by mouth 3 times daily (Patient not taking: Reported on 7/28/2022)        No current outpatient medications on file.      ALLERGIES/SENSITIVITIES:  No Known Allergies  Social History     Tobacco Use     Smoking status: Former Smoker     Smokeless tobacco: Never Used   Vaping Use     Vaping Use: Former   Substance Use Topics     Alcohol use: Not Currently     Drug use: Not Currently     Types: Marijuana     I have reviewed this patient's family history and updated it with pertinent information if needed.  Family History   Problem Relation Age of Onset     Substance Abuse Mother      Substance Abuse Father      Substance Abuse Maternal Grandfather          PHYSICAL EXAM:  Physical Exam   Temp: 98.1  F (36.7  C) Temp src: Oral BP: 128/63 Pulse: 101   Resp: 16 SpO2: 92 % O2 Device: None (Room air)    Vitals:    07/28/22 1318 07/28/22 1832 07/29/22 0431   Weight: 56.7 kg (125 lb) 61.1 kg (134 lb 9.6 oz) 60.4 kg (133 lb 3.2 oz)     Vital Signs with Ranges  Temp:  [97.8  F (36.6  C)-99  F (37.2  C)] 98.1  F (36.7  C)  Pulse:  [101-111] 101  Resp:  [10-20] 16  BP:  (128-161)/(58-90) 128/63  SpO2:  [92 %-99 %] 92 %  I/O last 3 completed shifts:  In: 1240 [P.O.:240; IV Piggyback:1000]  Out: -       Patient Vitals for the past 72 hrs:   Weight   07/29/22 0431 60.4 kg (133 lb 3.2 oz)   07/28/22 1832 61.1 kg (134 lb 9.6 oz)   07/28/22 1318 56.7 kg (125 lb)       General: Alert, NAD  HENT: Supple, Non-tender, no obvious JVD  Eyes: + scleral icterus  Cardiovascular: RRR, no rub, gallop, or murmur. No edema.  Respiratory: CTAB, non-labored  Gastrointestinal: Soft, non-tender, non-distended  Musculoskeletal: Grossly normal   Integumentary: Warm, dry, no rash.  Very jaundiced  Neurologic: Non focal   Psychiatric: Cooperative  Laboratory:     Recent Labs   Lab 07/29/22  0452 07/28/22  1502 07/28/22  1221 07/28/22  1025 07/25/22  1035   WBC 17.0* 19.9* 19.6* 18.7* 19.9*   RBC 2.01* 2.40* 2.04* 1.89* 1.74*   HGB 7.0* 8.3* 7.2* 6.9* 6.5*   HCT 20.3* 24.2* 20.8* 19.8* 19.5*   PLT 54* 70* 58* 66* 82*       Basic Metabolic Panel:  Recent Labs   Lab 07/29/22  0954 07/29/22  0810 07/29/22  0755 07/29/22  0623 07/29/22  0452 07/29/22  0425 07/28/22  1956 07/28/22  1946 07/28/22  1510 07/28/22  1502 07/28/22  1221 07/28/22  1025   NA  --  128*  --   --  127*  --   --  132*  --  123* 123* 123*   POTASSIUM  --  5.8*  --   --  6.2*  --   --  4.7  --  6.0* 6.4* 6.2*   CHLORIDE  --  89*  --   --  87*  --   --  91*  --  83* 82* 82*   CO2  --  28  --   --  31  --   --  33*  --  33* 31 31   BUN  --  43*  --   --  44*  --   --  43*  --  47* 47* 49*   CR  --  0.71  --   --  0.80  --   --  0.74  --  0.92 0.85 0.88   * 512* 564* 518* 555* 515*   < > 36*   < > 647* 694* 630*   SARTHAK  --  9.2  --   --  9.3  --   --  10.0  --  10.0 9.8 10.0    < > = values in this interval not displayed.       INR  Recent Labs   Lab 07/28/22  1502 07/28/22  1025   INR 1.90* 1.87*       Recent Labs   Lab Test 07/29/22  0810 07/29/22  0452   POTASSIUM 5.8* 6.2*   CHLORIDE 89* 87*   BUN 43* 44*      Recent Labs   Lab Test  07/28/22  1612 07/28/22  1502 07/28/22  1221 06/19/22  0644 06/18/22  1036   ALBUMIN  --  3.4* 3.2*   < >  --    BILITOTAL  --  24.5* 21.7*   < >  --    ALT  --  59* 57*   < >  --    AST  --  86* 80*   < >  --    PROTEIN Negative  --   --   --  Negative    < > = values in this interval not displayed.       Personally reviewed today's laboratory studies    This note was dictated using voice recognition       Thank you for involving us in the care of this patient. We will continue to follow along with you.      Jerman Beckwith PA-C  [facility]  [phone]

## 2022-07-29 NOTE — SIGNIFICANT EVENT
Significant Event Note    Hyperglycemia now at 555 - despite lantus 45 units and sliding scale insulin given last night and this morning     Hyperkalemia at 6.2 despite treatment yesterday - last dose of aldactone was July 27 for 100 mgs    Plans - 300 ml NSS and  lasix dose for AM can be given now to help. For glucose, continue LEXA and give Lantus 5 units x1. BMP at 8AM. Consider Renal consult - likely difficult to control hyperK

## 2022-07-29 NOTE — CONSULTS
"DIABETES CARE    Situation:  Consulted by Provider for Diabetes Education: severe hyperglycemia.  26 year old male with type 2 Diabetes. Patient was admitted for hyperglycemia and hyperkalemia.     Background:  Related Co-morbidities include: alocholic cirrhosis of the liver with ascites, anemia, HTN, alcoholic hepatitis, retinopathy   PCP: Gary Rodrigues  Social: lives at home with parents    Diabetes History:   Dx ~10 years ago. Has been on insulin for quite some time. Hx of using 2000 mg/d Metformin (didn't like taking such large pills). Has not used mealtime insulin. Mother wasn't excited about carbohydrate counting. Checks BG 2-3x/d. BG has been running in the ~300's. Patient \"feels best\" in the ~200's lately. Had felt better in ~100's years ago, though seems he has adapted to higher BG levels. Note 10 mg Prednisone started ~2-3 weeks ago.    Meds for BG Management PTA:  42 units Lantus  Rx for Novolog 5 units + 1:15 g CHO ratio -- not taking    Current Inpatient Meds for BG Management:  45 units basal  1:10 g CHO rapid  1 drops 25 High Resistance correction dose    Other Meds:   10 mg/d Prednisone    Labs:  Hemoglobin A1C: 5.4% - not reliable with low Hgb    Hgb: 7.0 g/dL s/p PRBC infusion    SCr: 0.71 mg/dL    GFR: >90 mL/min     Blood Glucose POC:    07/29/22 07:55 07/29/22 08:10 07/29/22 09:54 07/29/22 11:06 07/29/22 11:50 07/29/22 13:15   Glucose  512 (HH)   367 (H)    GLUCOSE BY METER POCT 564 (HH)  458 (H) 407 (H)  321 (H)     Diet Order: 60 grams CHO  Intake: Good   Weight: 61.4 kg    BMI: 22.2 kg/m^2    DM EDUCATION/COUNSELING:  Barriers to Learning and/or DM Self-Management: ASD - mother assists with BG checks and insulin administration  Previous DM Education: Does not appear to be much  Current education and/or visit with patient and/or caregiver:  Educated/reviewed diabetes basics: pathophysiology, hyperglycemia, long term complications, treatment.  Educated/reviewed hypoglycemia: sx, causes, " treatment.  Explained normal/goals of blood sugar control, A1C.  Educated/reviewed use of home glucose meter - when to check. Reviewed possibility of CGMS and patient+mother very interested.  Educated on normal pancreas function, how oral medications work, GLP-1 medications, and possible need for mealtime insulin.  Specific medications were explained, including how they each acted on blood sugar, their timing and differences in dosing.   Also discussed site rotation.   Carbohydrates were briefly discussed and their effect on BG. Reinforced healthy eating. Declined further RD visit.    Written handouts given on education provided.     Created goals with patient:  Check BG fasting each morning and prior to going to sleep. Check BG twice a day 2 hours after meal to watch BG trends. Explore use of CGMS.   Check with PCP regarding GLP-1 medication (Ozempic).     Assessment:  Patient and mother work together as a team to manage patient's medical conditions. Mother isn't excited to count CHO, though after some education, both patient and mother expressed understanding on how to count CHO. Given patient's inconsistent intake throughout the day, set meal insulin doses don't seem to make sense. Though since patient eats ~6-7 small meals throughout the day, often sweets taking a larger role in patient's diet since becoming sober, that would require many injections when doing an I:C ratio.   They are interested in exploring oral medication options as well as once weekly GLP-1 injection.   Reviewed steroid effect on BG and possible need for NPH insulin in addition to Lantus. They were open to this.  They also plan to look into getting a CGMS to assist with less finger pokes and getting a better handle on what BG is doing throughout the day (24/7 data).     Recommendations:  1. Consider NPH dosed with steroid (0.1 unit/kg) to assist with BG response to steroid effect based on the 10 mg Prednisone currently ordered.   2. Consider  "increasing basal insulin dose to 50 units - anticipate increased insulin resistance. Monitor fasting BG level (goal ~130 mg/dL) and continue to adjust as needed.    -- One option since patient is eating so frequently throughout the day, could have Lantus \"act\" as a meal insulin with ongoing titration of dose on outpatient basis.    -- Another option would be for patient to dose meal insulin based on 3 small meals per day (~45 grams CHO) and keep 3 snacks limited to ~15-20 grams CHO each. This would help limit the number of injections required.  3. Continue with I:C ratio with meals while hospitalized. Consider 1:5 grams CHO should BG remain difficult to control based on rule of 500. Patient and mother would prefer not doing mealtime insulin at discharge (for now).  4. Consider use of oral medication to assist with meal BG management upon discharge.   5. Patient to see PCP regarding use of GLP-1 medication (ie: Ozempic). Patient and mother were very interested in this possibility.     Refer to \"Guidelines for Insulin Initiation and Care in Hospitalized Adults\"  link in Diabetes Management Order set for dosing guidelines.  Hospital BG goals for blood glucose levels are < 180 for improved health outcomes.    F/U Plans:  PCP appointment in ~2 weeks. Plan to get referral to Endocrinologist and OP Diabetes Educator for further monitoring q 3 months until stabilized.     DISCHARGE NEEDS:  RX needed at [facility] (preferred by patient's insurance):  1. 1 FreeStyle Jerad 2 reader   2. 28-day supply of FreeStyle Jerad 2 sensors (2 sensors) Refills: PRN  3. Humulin N KwikPen, box of 5  4. Pen needles 32G x 4MM, box of 100  Insulin requiring E11.65; To test 4+x daily    Thank you,   Zuleika Garcia, SYLVIA, CSPCC, LD, Diabetes Educator    [facility]  [address] [place] [address]  [email or web address]  [email or web address]   Office: [phone]  Pager: [phone]  "

## 2022-07-29 NOTE — PROGRESS NOTES
[facility] MEDICINE  PROGRESS NOTE     Code Status: Full Code       Identification/Summary:   26 year old male with history of alcohol use disorder sober since April 2022, alcoholic cirrhosis, esophageal varices s/p banding, recurrent anemia, ASD, insulin dependent T2DM and high functioning autism.  7/28 presented to the ED for abnormal labs after receiving his regularly scheduled PRBC infusion. Initial evaluation in the ED notable for BG of 694 and potassium of 6.0.  Normal anion gap.  Hyperkalemia protocol initiated. EKG showing sinus tach. WBC of 19 and procalcitonin elevated to 1.53. Blood cultures NGTD x2. INR elevated to 1.90.  Given intravenous insulin and glucose improved to 37 and potassium 4.7.  7/20 8 PM received Lantus 42 units but following morning sugars up over 500 and potassium of 6.2.  Unclear etiology to persistent hyperglycemia.  Nephrology and diabetic nurse educator consulted.    Assessment and Plan:  Hyperosmotic hyperglycemic nonketotic state  Insulin-dependent type 2 diabetes.    Noncompliance with diabetic diet  Patient denies noncompliance with insulin but states he has not been following a diabetic diet and having many sweet drinks.  Does not feel well when his blood sugars are below 150.  Admission sugars over 600.  7/27 in the ED patient received NovoLog 30 units subcu and 5.67 units IV.  Subsequent glucose readings as low as 37.  7/27 PM received Lantus 45 units subcutaneous and mealtime NovoLog 2 units  7/28 early a.m. sugars up to 555.  Given Lantus 5 units x 1 and NovoLog 15 units.  Sliding scale changed to high resistant and mealtime NovoLog 1 unit per 10 g of carbs ordered.  1 L normal saline bolus ordered.  Diabetic educator consulted.  This patient need a different injection site?  Continue to follow readings closely.   Hyperkalemia   Admission potassium 6.4.  Given calcium gluconate, sodium bicarb, and insulin as above.  Potassium improved  temporarily to 4.7.  7/29 recheck potassium up to 6.2.  Nephrology consulted.  Recommend correction of his hyperglycemia and should then lead to potassium improvement.  Follow BMP closely.  Hyponatremia  Admission sodium 123 but this would correct to 133 due to his hyperglycemia.  Anticipate improvement with glucose improvement.  Follow BMP closely.  Alcoholic cirrhosis  Hold PTA spironolactone due to hyperkalemia.  Continue PTA furosemide, rifaximin and lactulose.  We will continue prednisone 10 mg nightly to avoid hypotension.  Continue PTA folic acid, thiamine and Protonix.  Close follow-up with [facility] gastroenterology will be key.  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.  Regularly receives PRBC infusions Mondays, Thursdays-most recent 7/28 hemoglobin 6.9--> 7.2--> 8.3.  7/29 hemoglobin down to 7 after IV fluids.  Continue to monitor with daily CBC, transfuse if Hgb <7.0  Leukocytosis  Elevated procal  New 4/6 holosystolic murmur  History of S. Aureus bacteremia May 2022  S. Aureus bacteremia noted on blood cultures 5/23/22, echo performed with no evidence of vegetations or significant stenosis/regurg of any valves (5/26/22)  Blood cultures drawn NGTD x2.  Given empiric vancomycin.  Echocardiogram EF 60 to 65%.  Normal RV SF and no hemodynamically significant valvular abnormalities.  Admission white count 18.7--> 19.9--> 16.9.  Chart review shows white count elevated on 7/21 but was normal on 7/7.  Thrombocytopenia   -Continue to monitor with daily CBC   High functional autism.  Depression  -Continue PTA fluoxetine   -Trazodone ordered for sleep   Mother is present and supportive.    COVID-19 status  -Infection: denies prior infection  -Vaccination: x3  -Negative upon admission   Elevated INR  Anticoagulation  Admission INR 1.87.  Slightly above his usual baseline around 1.7.  Follow daily INR.    Fluids: Normal saline bolus 1 L given x1.  Further fluids  TBD  Pain meds: Gabapentin as needed  Therapy: na  Negron:Not present  Current Diet  Orders Placed This Encounter      Moderate Consistent Carb (60 g CHO per Meal) Diet    Supplements  None    Barriers to Discharge: Hyperglycemia and hyperkalemia    Disposition: Hopeful discharge 1 to 2 days.  Would like to see 24 to 36 hours of reasonable glucose and electrolytes prior to discharge.    Interval History/Subjective:  Patient feeling well.  No chest pain.  No shortness of breath.  No nausea or vomiting.  Patient and mother deny insulin compliance but admits that he has not been following a diabetic diet.  Drinking many sugary fluids.  Very open to any recommendations to help with his situation.  Works with [facility] gastroenterology for his liver disease.  Has been sober from alcohol since April 2022.  Questions answered to verbalized satisfaction.    Physical Exam/Objective:  Temp:  [97.8  F (36.6  C)-99  F (37.2  C)] 98.1  F (36.7  C)  Pulse:  [101-111] 101  Resp:  [10-20] 16  BP: (128-161)/(58-90) 128/63  SpO2:  [92 %-99 %] 92 %  Wt Readings from Last 4 Encounters:   07/29/22 60.4 kg (133 lb 3.2 oz)   07/11/22 59 kg (130 lb)   07/09/22 65.8 kg (145 lb)   07/07/22 67.5 kg (148 lb 14.4 oz)     Body mass index is 22.17 kg/m .    Constitutional: awake, alert, cooperative, no apparent distress, appears older than stated age and poorly abdomen noted.  Very jaundiced.  ENT: Significant jaundice to sclera bilaterally, Normocephalic, without obvious abnormality, atraumatic, external ears without lesions, oral pharynx with moist mucous membranes, tonsils without erythema or exudates, gums normal and good dentition.  Respiratory: No increased work of breathing, good air exchange, clear to auscultation bilaterally, no crackles or wheezing  Cardiovascular: Normal apical impulse, regular rate and rhythm, normal S1 and S2, no S3 or S4, and systolic murmur noted  GI: Positive bowel sounds soft nontender.  Mild  distention.  Abdomen feels normal per patient.  Skin: jaundice noted  Musculoskeletal: There is no redness, warmth, or swelling of the joints.  Motor strength is 5 out of 5 all extremities bilaterally.  Tone is normal. no lower extremity pitting edema present  Neurologic: Cranial nerves II-XII are grossly intact. Sensory:  Sensory intact  Neuropsychiatric: General: normal, calm and normal eye contact Level of consciousness: alert / normal Affect: normal Orientation: oriented to self, place, time and situation Memory and insight: normal, memory for past and recent events intact and thought process normal      Medications:   Personally Reviewed.  Medications       dextrose  50 mL Intravenous Once     FLUoxetine  40 mg Oral At Bedtime     folic acid  1 mg Oral Daily     furosemide  40 mg Oral BID     insulin aspart  1-10 Units Subcutaneous TID AC     insulin aspart  1-7 Units Subcutaneous At Bedtime     insulin aspart   Subcutaneous Daily with breakfast     insulin aspart   Subcutaneous Daily with lunch     insulin aspart   Subcutaneous Daily with supper     insulin glargine  45 Units Subcutaneous At Bedtime     lactulose  20 g Oral TID     magnesium oxide  400 mg Oral Daily     multivitamin, therapeutic  1 tablet Oral Daily     pantoprazole  40 mg Oral BID     predniSONE  10 mg Oral QPM     rifaximin  550 mg Oral BID     sodium chloride (PF)  10-40 mL Intracatheter Q7 Days     sodium chloride (PF)  3 mL Intracatheter Q8H     thiamine  100 mg Oral Daily     vancomycin  1,000 mg Intravenous Q12H       Data reviewed today: I personally reviewed all new medications, labs, imaging/diagnostics reports over the past 24 hours. Pertinent findings include:    Imaging:   Recent Results (from the past 24 hour(s))   Echocardiogram Complete   Result Value    LVEF  60-65%    Narrative    [id number]  [id number]  [id number]  [id number] [address]     Name: EYAL ROONEY  MRN:  [id number]  : 1995  Study Date: 2022 08:46 AM  Age: 26 yrs  Gender: Male  Patient Location: [facility]  Reason For Study: Murmur, Endocarditis  Ordering Physician: DAR QUIROZ  Performed By: CM     BSA: 1.7 m2  Height: 65 in  Weight: 133 lb  HR: 99  ______________________________________________________________________________  Procedure  Complete Echo Adult. Definity (NDC #50647-038) given intravenously.  ______________________________________________________________________________  Interpretation Summary     Left ventricular size, wall motion and function are normal. The ejection  fraction is 60-65%.  Normal right ventricle size and systolic function.  No hemodynamically significant valvular abnormalities on 2D or color flow  imaging.  ______________________________________________________________________________  Left Ventricle  Left ventricular size, wall motion and function are normal. The ejection  fraction is 60-65%. There is borderline concentric left ventricular  hypertrophy. Left ventricular diastolic function is normal. No regional wall  motion abnormalities noted.     Right Ventricle  Normal right ventricle size and systolic function.     Atria  Normal left atrial size. Right atrial size is normal. There is no color  Doppler evidence of an atrial shunt.     Mitral Valve  Mitral valve leaflets appear normal. There is no evidence of mitral stenosis  or clinically significant mitral regurgitation.     Tricuspid Valve  Right ventricle systolic pressure estimate normal.     Aortic Valve  Aortic valve leaflets appear normal. There is no evidence of aortic stenosis  or clinically significant aortic regurgitation.     Pulmonic Valve  The pulmonic valve is not well seen, but is grossly normal. This degree of  valvular regurgitation is within normal limits. Mildly increased PV velocity.     Vessels  The aorta root is normal. Normal size ascending aorta. IVC diameter <2.1 cm  collapsing >50% with sniff  suggests a normal RA pressure of 3 mmHg.     Pericardium  There is no pericardial effusion.     ______________________________________________________________________________  MMode/2D Measurements & Calculations  IVSd: 1.2 cm  LVIDd: 4.6 cm  LVIDs: 3.4 cm  LVPWd: 1.1 cm     FS: 27.0 %  LV mass(C)d: 191.7 grams  LV mass(C)dI: 115.3 grams/m2  Ao root diam: 2.5 cm  LA dimension: 4.2 cm  asc Aorta Diam: 2.7 cm  LA/Ao: 1.7  LVOT diam: 2.1 cm  LVOT area: 3.3 cm2  LA Volume Indexed (AL/bp): 45.8 ml/m2  RWT: 0.46     Doppler Measurements & Calculations  MV E max luca: 106.0 cm/sec  MV A max luca: 88.4 cm/sec  MV E/A: 1.2     MV dec slope: 1039 cm/sec2  MV dec time: 0.10 sec  Ao V2 max: 238.9 cm/sec  Ao max P.0 mmHg  Ao V2 mean: 134.3 cm/sec  Ao mean P.0 mmHg  Ao V2 VTI: 40.2 cm  THOR(I,D): 2.2 cm2  THOR(V,D): 2.2 cm2  LV V1 max PG: 10.4 mmHg  LV V1 max: 161.1 cm/sec  LV V1 VTI: 26.2 cm  SV(LVOT): 87.2 ml  SI(LVOT): 52.4 ml/m2  PA V2 max: 192.7 cm/sec  PA max P.9 mmHg  PA acc time: 0.08 sec  AV Luca Ratio (DI): 0.67  THOR Index (cm2/m2): 1.3  E/E' av.7  Lateral E/e': 7.5  Medial E/e': 9.9     ______________________________________________________________________________  Report approved by: Sofya Saucedo 2022 09:28 AM             Labs:  Echocardiogram Complete   Final Result        Recent Results (from the past 24 hour(s))   Basic metabolic panel  (Ca, Cl, CO2, Creat, Gluc, K, Na, BUN)    Collection Time: 22 12:21 PM   Result Value Ref Range    Sodium 123 (L) 136 - 145 mmol/L    Potassium 6.4 (HH) 3.5 - 5.0 mmol/L    Chloride 82 (L) 98 - 107 mmol/L    Carbon Dioxide (CO2) 31 22 - 31 mmol/L    Anion Gap 10 5 - 18 mmol/L    Urea Nitrogen 47 (H) 8 - 22 mg/dL    Creatinine 0.85 0.70 - 1.30 mg/dL    Calcium 9.8 8.5 - 10.5 mg/dL    Glucose 694 (HH) 70 - 125 mg/dL    GFR Estimate >90 >60 mL/min/1.73m2   Hepatic panel (Albumin, ALT, AST, Bili, Alk Phos, TP)    Collection Time: 22 12:21 PM    Result Value Ref Range    Bilirubin Total 21.7 (HH) 0.0 - 1.0 mg/dL    Bilirubin Direct 6.2 (H) <=0.5 mg/dL    Protein Total 7.8 6.0 - 8.0 g/dL    Albumin 3.2 (L) 3.5 - 5.0 g/dL    Alkaline Phosphatase 300 (H) 45 - 120 U/L    AST 80 (H) 0 - 40 U/L    ALT 57 (H) 0 - 45 U/L   CBC with platelets and differential    Collection Time: 07/28/22 12:21 PM   Result Value Ref Range    WBC Count 19.6 (H) 4.0 - 11.0 10e3/uL    RBC Count 2.04 (L) 4.40 - 5.90 10e6/uL    Hemoglobin 7.2 (L) 13.3 - 17.7 g/dL    Hematocrit 20.8 (L) 40.0 - 53.0 %     (H) 78 - 100 fL    MCH 35.3 (H) 26.5 - 33.0 pg    MCHC 34.6 31.5 - 36.5 g/dL    RDW 21.0 (H) 10.0 - 15.0 %    Platelet Count 58 (L) 150 - 450 10e3/uL   Manual Differential    Collection Time: 07/28/22 12:21 PM   Result Value Ref Range    % Neutrophils 77 %    % Lymphocytes 10 %    % Monocytes 9 %    % Eosinophils 3 %    % Basophils 0 %    % Metamyelocytes 1 %    Absolute Neutrophils 15.1 (H) 1.6 - 8.3 10e3/uL    Absolute Lymphocytes 2.0 0.8 - 5.3 10e3/uL    Absolute Monocytes 1.8 (H) 0.0 - 1.3 10e3/uL    Absolute Eosinophils 0.6 0.0 - 0.7 10e3/uL    Absolute Basophils 0.0 0.0 - 0.2 10e3/uL    Absolute Metamyelocytes 0.2 (H) <=0.0 10e3/uL    RBC Morphology Confirmed RBC Indices     Platelet Assessment  Automated Count Confirmed. Platelet morphology is normal.     Automated Count Confirmed. Platelet morphology is normal.    Cheney Cells Slight (A) None Seen   Comprehensive metabolic panel    Collection Time: 07/28/22  3:02 PM   Result Value Ref Range    Sodium 123 (L) 136 - 145 mmol/L    Potassium 6.0 (H) 3.5 - 5.0 mmol/L    Chloride 83 (L) 98 - 107 mmol/L    Carbon Dioxide (CO2) 33 (H) 22 - 31 mmol/L    Anion Gap 7 5 - 18 mmol/L    Urea Nitrogen 47 (H) 8 - 22 mg/dL    Creatinine 0.92 0.70 - 1.30 mg/dL    Calcium 10.0 8.5 - 10.5 mg/dL    Glucose 647 (HH) 70 - 125 mg/dL    Alkaline Phosphatase 303 (H) 45 - 120 U/L    AST 86 (H) 0 - 40 U/L    ALT 59 (H) 0 - 45 U/L    Protein Total 8.1  (H) 6.0 - 8.0 g/dL    Albumin 3.4 (L) 3.5 - 5.0 g/dL    Bilirubin Total 24.5 (HH) 0.0 - 1.0 mg/dL    GFR Estimate >90 >60 mL/min/1.73m2   INR    Collection Time: 07/28/22  3:02 PM   Result Value Ref Range    INR 1.90 (H) 0.85 - 1.15   Lipase    Collection Time: 07/28/22  3:02 PM   Result Value Ref Range    Lipase 35 0 - 52 U/L   Magnesium    Collection Time: 07/28/22  3:02 PM   Result Value Ref Range    Magnesium 1.9 1.8 - 2.6 mg/dL   Ethyl Alcohol Level    Collection Time: 07/28/22  3:02 PM   Result Value Ref Range    Alcohol, Blood <10 None detected mg/dL   Ammonia    Collection Time: 07/28/22  3:02 PM   Result Value Ref Range    Ammonia 72 (H) 11 - 35 umol/L   Blood gas venous    Collection Time: 07/28/22  3:02 PM   Result Value Ref Range    pH Venous 7.43 7.35 - 7.45    pCO2 Venous 57 (H) 35 - 50 mm Hg    pO2 Venous 36 25 - 47 mm Hg    Bicarbonate Venous 35 (H) 24 - 30 mmol/L    Base Excess/Deficit (+/-) 13.3   mmol/L    Oxyhemoglobin Venous 64.6 (L) 70.0 - 75.0 %    O2 Sat, Venous 68.6 (L) 70.0 - 75.0 %   Blood Culture Line, venous    Collection Time: 07/28/22  3:02 PM    Specimen: Line, venous; Blood   Result Value Ref Range    Culture No growth after 12 hours    Procalcitonin    Collection Time: 07/28/22  3:02 PM   Result Value Ref Range    Procalcitonin 1.53 (H) 0.00 - 0.49 ng/mL   Hemoglobin A1c    Collection Time: 07/28/22  3:02 PM   Result Value Ref Range    Hemoglobin A1C 5.4 <=5.6 %   CBC with platelets and differential    Collection Time: 07/28/22  3:02 PM   Result Value Ref Range    WBC Count 19.9 (H) 4.0 - 11.0 10e3/uL    RBC Count 2.40 (L) 4.40 - 5.90 10e6/uL    Hemoglobin 8.3 (L) 13.3 - 17.7 g/dL    Hematocrit 24.2 (L) 40.0 - 53.0 %     (H) 78 - 100 fL    MCH 34.6 (H) 26.5 - 33.0 pg    MCHC 34.3 31.5 - 36.5 g/dL    RDW 22.5 (H) 10.0 - 15.0 %    Platelet Count 70 (L) 150 - 450 10e3/uL   Manual Differential    Collection Time: 07/28/22  3:02 PM   Result Value Ref Range    % Neutrophils 73 %     % Lymphocytes 23 %    % Monocytes 3 %    % Eosinophils 0 %    % Basophils 1 %    Absolute Neutrophils 14.5 (H) 1.6 - 8.3 10e3/uL    Absolute Lymphocytes 4.6 0.8 - 5.3 10e3/uL    Absolute Monocytes 0.6 0.0 - 1.3 10e3/uL    Absolute Eosinophils 0.0 0.0 - 0.7 10e3/uL    Absolute Basophils 0.2 0.0 - 0.2 10e3/uL    RBC Morphology Confirmed RBC Indices     Platelet Assessment  Automated Count Confirmed. Platelet morphology is normal.     Automated Count Confirmed. Platelet morphology is normal.    Acanthocytes Moderate (A) None Seen   Blood Culture Line, venous    Collection Time: 07/28/22  3:07 PM    Specimen: Line, venous; Blood   Result Value Ref Range    Culture No growth after 12 hours    Glucose by meter    Collection Time: 07/28/22  3:10 PM   Result Value Ref Range    GLUCOSE BY METER POCT 571 (HH) 70 - 99 mg/dL   Glucose by meter    Collection Time: 07/28/22  4:10 PM   Result Value Ref Range    GLUCOSE BY METER POCT 486 (H) 70 - 99 mg/dL   Asymptomatic COVID-19 Virus (Coronavirus) by PCR Nasopharyngeal    Collection Time: 07/28/22  4:11 PM    Specimen: Nasopharyngeal; Swab   Result Value Ref Range    SARS CoV2 PCR Negative Negative   UA with Microscopic reflex to Culture    Collection Time: 07/28/22  4:12 PM    Specimen: Urine, Midstream   Result Value Ref Range    Color Urine Yellow Colorless, Straw, Light Yellow, Yellow    Appearance Urine Clear Clear    Glucose Urine >1000 (A) Negative mg/dL    Bilirubin Urine Negative Negative    Ketones Urine Negative Negative mg/dL    Specific Gravity Urine 1.025 1.001 - 1.030    Blood Urine Negative Negative    pH Urine 6.5 5.0 - 7.0    Protein Albumin Urine Negative Negative mg/dL    Urobilinogen Urine 12.0 (A) <2.0 mg/dL    Nitrite Urine Negative Negative    Leukocyte Esterase Urine Negative Negative    RBC Urine 1 <=2 /HPF    WBC Urine <1 <=5 /HPF    Squamous Epithelials Urine <1 <=1 /HPF   Drugs of Abuse 1+ Panel, Urine ([facility] Only)    Collection Time: 07/28/22  4:12  PM   Result Value Ref Range    Amphetamines Urine Screen Negative Screen Negative    Benzodiazepines Urine Screen Negative Screen Negative    Opiates Urine Screen Negative Screen Negative    PCP Urine Screen Negative Screen Negative    Cannabinoids Urine Screen Negative Screen Negative    Barbiturates Urine Screen Negative Screen Negative    Cocaine Urine Screen Negative Screen Negative    Methadone Urine Screen Negative Screen Negative    Oxycodone Urine Screen Negative Screen Negative    Creatinine Urine mg/dL 19 mg/dL   Glucose by meter    Collection Time: 07/28/22  6:38 PM   Result Value Ref Range    GLUCOSE BY METER POCT 157 (H) 70 - 99 mg/dL   Glucose by meter    Collection Time: 07/28/22  7:42 PM   Result Value Ref Range    GLUCOSE BY METER POCT 37 (LL) 70 - 99 mg/dL   Lactic Acid STAT    Collection Time: 07/28/22  7:46 PM   Result Value Ref Range    Lactic Acid 1.1 0.7 - 2.0 mmol/L   Basic metabolic panel    Collection Time: 07/28/22  7:46 PM   Result Value Ref Range    Sodium 132 (L) 136 - 145 mmol/L    Potassium 4.7 3.5 - 5.0 mmol/L    Chloride 91 (L) 98 - 107 mmol/L    Carbon Dioxide (CO2) 33 (H) 22 - 31 mmol/L    Anion Gap 8 5 - 18 mmol/L    Urea Nitrogen 43 (H) 8 - 22 mg/dL    Creatinine 0.74 0.70 - 1.30 mg/dL    Calcium 10.0 8.5 - 10.5 mg/dL    Glucose 36 (LL) 70 - 125 mg/dL    GFR Estimate >90 >60 mL/min/1.73m2   Glucose by meter    Collection Time: 07/28/22  7:56 PM   Result Value Ref Range    GLUCOSE BY METER POCT 66 (L) 70 - 99 mg/dL   Glucose by meter    Collection Time: 07/28/22  8:15 PM   Result Value Ref Range    GLUCOSE BY METER POCT 246 (H) 70 - 99 mg/dL   Glucose by meter    Collection Time: 07/28/22  8:29 PM   Result Value Ref Range    GLUCOSE BY METER POCT 262 (H) 70 - 99 mg/dL   Glucose by meter    Collection Time: 07/28/22  9:16 PM   Result Value Ref Range    GLUCOSE BY METER POCT 259 (H) 70 - 99 mg/dL   Glucose by meter    Collection Time: 07/28/22 10:12 PM   Result Value Ref Range     GLUCOSE BY METER POCT 285 (H) 70 - 99 mg/dL   Glucose by meter    Collection Time: 07/28/22 11:39 PM   Result Value Ref Range    GLUCOSE BY METER POCT 341 (H) 70 - 99 mg/dL   Glucose by meter    Collection Time: 07/29/22  2:07 AM   Result Value Ref Range    GLUCOSE BY METER POCT 446 (H) 70 - 99 mg/dL   Glucose by meter    Collection Time: 07/29/22  4:25 AM   Result Value Ref Range    GLUCOSE BY METER POCT 515 (HH) 70 - 99 mg/dL   Basic metabolic panel    Collection Time: 07/29/22  4:52 AM   Result Value Ref Range    Sodium 127 (L) 136 - 145 mmol/L    Potassium 6.2 (HH) 3.5 - 5.0 mmol/L    Chloride 87 (L) 98 - 107 mmol/L    Carbon Dioxide (CO2) 31 22 - 31 mmol/L    Anion Gap 9 5 - 18 mmol/L    Urea Nitrogen 44 (H) 8 - 22 mg/dL    Creatinine 0.80 0.70 - 1.30 mg/dL    Calcium 9.3 8.5 - 10.5 mg/dL    Glucose 555 (HH) 70 - 125 mg/dL    GFR Estimate >90 >60 mL/min/1.73m2   CBC with platelets    Collection Time: 07/29/22  4:52 AM   Result Value Ref Range    WBC Count 17.0 (H) 4.0 - 11.0 10e3/uL    RBC Count 2.01 (L) 4.40 - 5.90 10e6/uL    Hemoglobin 7.0 (L) 13.3 - 17.7 g/dL    Hematocrit 20.3 (L) 40.0 - 53.0 %     (H) 78 - 100 fL    MCH 34.8 (H) 26.5 - 33.0 pg    MCHC 34.5 31.5 - 36.5 g/dL    RDW      Platelet Count 54 (L) 150 - 450 10e3/uL   Glucose by meter    Collection Time: 07/29/22  6:23 AM   Result Value Ref Range    GLUCOSE BY METER POCT 518 (HH) 70 - 99 mg/dL   Glucose by meter    Collection Time: 07/29/22  7:55 AM   Result Value Ref Range    GLUCOSE BY METER POCT 564 (HH) 70 - 99 mg/dL   Basic metabolic panel    Collection Time: 07/29/22  8:10 AM   Result Value Ref Range    Sodium 128 (L) 136 - 145 mmol/L    Potassium 5.8 (H) 3.5 - 5.0 mmol/L    Chloride 89 (L) 98 - 107 mmol/L    Carbon Dioxide (CO2) 28 22 - 31 mmol/L    Anion Gap 11 5 - 18 mmol/L    Urea Nitrogen 43 (H) 8 - 22 mg/dL    Creatinine 0.71 0.70 - 1.30 mg/dL    Calcium 9.2 8.5 - 10.5 mg/dL    Glucose 512 (HH) 70 - 125 mg/dL    GFR Estimate >90  >60 mL/min/1.73m2   Echocardiogram Complete    Collection Time: 07/29/22  9:20 AM   Result Value Ref Range    LVEF  60-65%    Glucose by meter    Collection Time: 07/29/22  9:54 AM   Result Value Ref Range    GLUCOSE BY METER POCT 458 (H) 70 - 99 mg/dL   Glucose by meter    Collection Time: 07/29/22 11:06 AM   Result Value Ref Range    GLUCOSE BY METER POCT 407 (H) 70 - 99 mg/dL       Pending Labs:  Unresulted Labs Ordered in the Past 30 Days of this Admission     Date and Time Order Name Status Description    7/28/2022  2:33 PM Blood Culture Line, venous Preliminary     7/28/2022  2:33 PM Blood Culture Line, venous Preliminary           Deni Vergara MD  [facility]  Phone: #[phone]

## 2022-07-30 LAB
ABO/RH(D): NORMAL
ANION GAP SERPL CALCULATED.3IONS-SCNC: 10 MMOL/L (ref 5–18)
ANION GAP SERPL CALCULATED.3IONS-SCNC: 9 MMOL/L (ref 5–18)
ANION GAP SERPL CALCULATED.3IONS-SCNC: 9 MMOL/L (ref 5–18)
ANTIBODY SCREEN: NEGATIVE
BLD PROD TYP BPU: NORMAL
BLOOD COMPONENT TYPE: NORMAL
BUN SERPL-MCNC: 37 MG/DL (ref 8–22)
BUN SERPL-MCNC: 40 MG/DL (ref 8–22)
BUN SERPL-MCNC: 42 MG/DL (ref 8–22)
CALCIUM SERPL-MCNC: 8.6 MG/DL (ref 8.5–10.5)
CALCIUM SERPL-MCNC: 8.7 MG/DL (ref 8.5–10.5)
CALCIUM SERPL-MCNC: 8.8 MG/DL (ref 8.5–10.5)
CHLORIDE BLD-SCNC: 90 MMOL/L (ref 98–107)
CHLORIDE BLD-SCNC: 92 MMOL/L (ref 98–107)
CHLORIDE BLD-SCNC: 92 MMOL/L (ref 98–107)
CO2 SERPL-SCNC: 27 MMOL/L (ref 22–31)
CO2 SERPL-SCNC: 28 MMOL/L (ref 22–31)
CO2 SERPL-SCNC: 28 MMOL/L (ref 22–31)
CODING SYSTEM: NORMAL
CREAT SERPL-MCNC: 0.68 MG/DL (ref 0.7–1.3)
CREAT SERPL-MCNC: 0.76 MG/DL (ref 0.7–1.3)
CREAT SERPL-MCNC: 0.79 MG/DL (ref 0.7–1.3)
CREAT UR-MCNC: 45 MG/DL
CROSSMATCH: NORMAL
ERYTHROCYTE [DISTWIDTH] IN BLOOD BY AUTOMATED COUNT: 22.9 % (ref 10–15)
FRACT EXCRET NA UR+SERPL-RTO: NORMAL %
GFR SERPL CREATININE-BSD FRML MDRD: >90 ML/MIN/1.73M2
GLUCOSE BLD-MCNC: 191 MG/DL (ref 70–125)
GLUCOSE BLD-MCNC: 310 MG/DL (ref 70–125)
GLUCOSE BLD-MCNC: 333 MG/DL (ref 70–125)
GLUCOSE BLDC GLUCOMTR-MCNC: 168 MG/DL (ref 70–99)
GLUCOSE BLDC GLUCOMTR-MCNC: 233 MG/DL (ref 70–99)
GLUCOSE BLDC GLUCOMTR-MCNC: 235 MG/DL (ref 70–99)
GLUCOSE BLDC GLUCOMTR-MCNC: 235 MG/DL (ref 70–99)
GLUCOSE BLDC GLUCOMTR-MCNC: 262 MG/DL (ref 70–99)
GLUCOSE BLDC GLUCOMTR-MCNC: 334 MG/DL (ref 70–99)
GLUCOSE BLDC GLUCOMTR-MCNC: 336 MG/DL (ref 70–99)
GLUCOSE BLDC GLUCOMTR-MCNC: 357 MG/DL (ref 70–99)
HCT VFR BLD AUTO: 18.6 % (ref 40–53)
HCT VFR BLD AUTO: 20.3 % (ref 40–53)
HGB BLD-MCNC: 6.5 G/DL (ref 13.3–17.7)
HGB BLD-MCNC: 7.3 G/DL (ref 13.3–17.7)
ISSUE DATE AND TIME: NORMAL
MCH RBC QN AUTO: 35.3 PG (ref 26.5–33)
MCHC RBC AUTO-ENTMCNC: 34.9 G/DL (ref 31.5–36.5)
MCV RBC AUTO: 101 FL (ref 78–100)
PLATELET # BLD AUTO: 51 10E3/UL (ref 150–450)
POTASSIUM BLD-SCNC: 4.9 MMOL/L (ref 3.5–5)
POTASSIUM BLD-SCNC: 5 MMOL/L (ref 3.5–5)
POTASSIUM BLD-SCNC: 5.6 MMOL/L (ref 3.5–5)
RBC # BLD AUTO: 1.84 10E6/UL (ref 4.4–5.9)
SODIUM SERPL-SCNC: 127 MMOL/L (ref 136–145)
SODIUM SERPL-SCNC: 129 MMOL/L (ref 136–145)
SODIUM SERPL-SCNC: 129 MMOL/L (ref 136–145)
SODIUM UR-SCNC: <20 MMOL/L
SPECIMEN EXPIRATION DATE: NORMAL
TSH SERPL DL<=0.005 MIU/L-ACNC: 1.09 UIU/ML (ref 0.3–5)
UNIT ABO/RH: NORMAL
UNIT NUMBER: NORMAL
UNIT STATUS: NORMAL
UNIT TYPE ISBT: 600
WBC # BLD AUTO: 16.9 10E3/UL (ref 4–11)

## 2022-07-30 PROCEDURE — 80048 BASIC METABOLIC PNL TOTAL CA: CPT | Performed by: FAMILY MEDICINE

## 2022-07-30 PROCEDURE — 84443 ASSAY THYROID STIM HORMONE: CPT | Performed by: FAMILY MEDICINE

## 2022-07-30 PROCEDURE — 99232 SBSQ HOSP IP/OBS MODERATE 35: CPT | Performed by: FAMILY MEDICINE

## 2022-07-30 PROCEDURE — 86901 BLOOD TYPING SEROLOGIC RH(D): CPT | Performed by: INTERNAL MEDICINE

## 2022-07-30 PROCEDURE — 86923 COMPATIBILITY TEST ELECTRIC: CPT | Performed by: FAMILY MEDICINE

## 2022-07-30 PROCEDURE — 84300 ASSAY OF URINE SODIUM: CPT | Performed by: FAMILY MEDICINE

## 2022-07-30 PROCEDURE — 258N000003 HC RX IP 258 OP 636: Performed by: INTERNAL MEDICINE

## 2022-07-30 PROCEDURE — 250N000013 HC RX MED GY IP 250 OP 250 PS 637: Performed by: INTERNAL MEDICINE

## 2022-07-30 PROCEDURE — 86850 RBC ANTIBODY SCREEN: CPT | Performed by: INTERNAL MEDICINE

## 2022-07-30 PROCEDURE — 85027 COMPLETE CBC AUTOMATED: CPT | Performed by: FAMILY MEDICINE

## 2022-07-30 PROCEDURE — P9016 RBC LEUKOCYTES REDUCED: HCPCS | Performed by: INTERNAL MEDICINE

## 2022-07-30 PROCEDURE — 86923 COMPATIBILITY TEST ELECTRIC: CPT | Performed by: INTERNAL MEDICINE

## 2022-07-30 PROCEDURE — 250N000012 HC RX MED GY IP 250 OP 636 PS 637: Performed by: FAMILY MEDICINE

## 2022-07-30 PROCEDURE — 210N000002 HC R&B HEART CARE

## 2022-07-30 PROCEDURE — 85018 HEMOGLOBIN: CPT | Performed by: HOSPITALIST

## 2022-07-30 PROCEDURE — 250N000011 HC RX IP 250 OP 636: Performed by: INTERNAL MEDICINE

## 2022-07-30 RX ADMIN — INSULIN HUMAN 5 UNITS: 100 INJECTION, SUSPENSION SUBCUTANEOUS at 09:44

## 2022-07-30 RX ADMIN — INSULIN HUMAN 10 UNITS: 100 INJECTION, SUSPENSION SUBCUTANEOUS at 16:36

## 2022-07-30 RX ADMIN — RIFAXIMIN 550 MG: 550 TABLET ORAL at 20:41

## 2022-07-30 RX ADMIN — RIFAXIMIN 550 MG: 550 TABLET ORAL at 10:33

## 2022-07-30 RX ADMIN — PANTOPRAZOLE SODIUM 40 MG: 20 TABLET, DELAYED RELEASE ORAL at 20:41

## 2022-07-30 RX ADMIN — VANCOMYCIN HYDROCHLORIDE 1000 MG: 5 INJECTION, POWDER, LYOPHILIZED, FOR SOLUTION INTRAVENOUS at 10:36

## 2022-07-30 RX ADMIN — FOLIC ACID 1 MG: 1 TABLET ORAL at 10:34

## 2022-07-30 RX ADMIN — THERA TABS 1 TABLET: TAB at 10:32

## 2022-07-30 RX ADMIN — PANTOPRAZOLE SODIUM 40 MG: 20 TABLET, DELAYED RELEASE ORAL at 10:33

## 2022-07-30 RX ADMIN — LACTULOSE 20 G: 10 SOLUTION ORAL at 10:35

## 2022-07-30 RX ADMIN — FLUOXETINE 40 MG: 20 CAPSULE ORAL at 20:41

## 2022-07-30 RX ADMIN — ONDANSETRON 4 MG: 4 TABLET, ORALLY DISINTEGRATING ORAL at 18:49

## 2022-07-30 RX ADMIN — Medication 400 MG: at 10:34

## 2022-07-30 RX ADMIN — VANCOMYCIN HYDROCHLORIDE 1000 MG: 5 INJECTION, POWDER, LYOPHILIZED, FOR SOLUTION INTRAVENOUS at 21:19

## 2022-07-30 RX ADMIN — FUROSEMIDE 40 MG: 40 TABLET ORAL at 10:35

## 2022-07-30 RX ADMIN — PREDNISONE 10 MG: 5 TABLET ORAL at 16:37

## 2022-07-30 RX ADMIN — FUROSEMIDE 40 MG: 40 TABLET ORAL at 20:41

## 2022-07-30 RX ADMIN — Medication 100 MG: at 10:33

## 2022-07-30 ASSESSMENT — ACTIVITIES OF DAILY LIVING (ADL)
ADLS_ACUITY_SCORE: 20

## 2022-07-30 NOTE — PROGRESS NOTES
Hgb of 6.5 (from 7)--- Discussed B/R/C/A including iron -- and he prefer PRBC. He had prior transfusion. No reported GI bleeding symptoms/signs     Plans -- 1 unit PRBC ordered

## 2022-07-30 NOTE — PROGRESS NOTES
[facility] MEDICINE  PROGRESS NOTE     Code Status: Full Code       Identification/Summary:   26 year old male with history of alcohol use disorder sober since April 2022, alcoholic cirrhosis, esophageal varices s/p banding, recurrent anemia, ASD, insulin dependent T2DM and high functioning autism.  7/28 presented to the ED for abnormal labs after receiving his regularly scheduled PRBC infusion. Initial evaluation in the ED notable for BG of 694 and potassium of 6.0.  Normal anion gap.  Hyperkalemia protocol initiated. EKG showing sinus tach. WBC of 19 and procalcitonin elevated to 1.53. Blood cultures NGTD x2. INR elevated to 1.90.  Given intravenous insulin and glucose improved to 37 and potassium 4.7.  7/28 PM received Lantus 42 units but following morning sugars up over 500 and potassium of 6.2.  Nephrology and diabetic nurse educator consults appreciated.  Initiated on NPH insulin and increase Lantus with improvement in sugars.     Assessment and Plan:  Hyperosmotic hyperglycemic nonketotic state  Insulin-dependent type 2 diabetes.    Noncompliance with diabetic diet  Patient denies noncompliance with insulin but states he has not been following a diabetic diet and having many sweet drinks.  Does not feel well when his blood sugars are below 150.  Admission sugars over 600.  7/27 in the ED patient received NovoLog 30 units subcu and 5.67 units IV.  Subsequent glucose readings as low as 37.  7/27 PM received Lantus 45 units subcutaneous and mealtime NovoLog 2 units  7/28 early a.m. sugars up to 555.  Given Lantus 5 units x 1 and NovoLog 15 units.  Sliding scale changed to high resistant and mealtime NovoLog 1 unit per 10 g of carbs ordered, Lantus increased to 50 units nightly and given NPH 5 units with evening prednisone.  Greatly appreciate diabetic educator consultation.  7/29 sugars improved to 262.  Given NPH 5 units with breakfast and increase evening to 10 units with  dinner prednisone.  7/30 increase Lantus to 55 units nightly.  Hyperkalemia   Admission potassium 6.4.  Given calcium gluconate, sodium bicarb, and insulin as above.  Potassium improved temporarily to 4.7.  7/29 recheck potassium up to 6.2.  Nephrology consulted.  Recommend correction of his hyperglycemia and should then lead to potassium improvement.  Subsequent potassium 5.5--> 5--> 5.6--> 4.9.  Follow BMP daily.  Hyponatremia  Admission sodium 123 but this would correct to 133 due to his hyperglycemia.  Anticipate improvement with glucose improvement.  7/30 sodium 127 but corrected up to 131.  Trial fluid restriction 1800 mL per 24 hours.  Check FENa  Follow BMP closely and thyroid level normal TSH 1.09.  Alcoholic cirrhosis  Hold PTA spironolactone due to hyperkalemia.  Continue PTA furosemide, rifaximin and lactulose.  We will continue prednisone 10 mg nightly to avoid hypotension.  Continue PTA folic acid, thiamine and Protonix.  Close follow-up with [facility] gastroenterology will be key.  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.  Regularly receives PRBC infusions Mondays, Thursdays-most recent 7/28 hemoglobin 6.9--> 7.2--> 8.3--> 7--> 6.5.  Transfuse PRBC x1.  Continue to monitor with daily CBC, transfuse if Hgb <7.0.  Elevated procal  Chronic leukocytosis  New 4/6 holosystolic murmur  History of S. Aureus bacteremia May 2022  S. Aureus bacteremia noted on blood cultures 5/23/22, echo performed with no evidence of vegetations or significant stenosis/regurg of any valves (5/26/22)  Blood cultures drawn NGTD x2.  Given empiric vancomycin.  Echocardiogram EF 60 to 65%.  Normal RV SF and no hemodynamically significant valvular abnormalities.  Admission white count 18.7--> 19.9--> 16.9.  Is on chronic prednisone.  No further checks indicated.  Chronic thrombocytopenia   Holding stable in the mid 50 range.  Continue to monitor with daily platelet level   High  functional autism.  Depression  -Continue PTA fluoxetine   -Trazodone ordered for sleep   Mother is present and supportive.     COVID-19 status negative from 7/28/2022  Vaccination: x3  Standard precautions.  Elevated INR  Anticoagulation  Admission INR 1.87.  Slightly above his usual baseline around 1.7.  Follow daily INR.     Fluids:  Saline lock  Pain meds: Gabapentin as needed  Therapy: na  Negron:Not present  Current Diet  Orders Placed This Encounter      Moderate Consistent Carb (60 g CHO per Meal) Diet    Supplements  Active Nourishment Order   Procedures     Fluid restriction 1800 ML FLUID     Barriers to Discharge: Hyperglycemia, hyponatremia, hyperkalemia    Disposition: Hopeful discharge 1 to 2 days    Interval History/Subjective:  Patient feeling better.  Mother present and supportive.  No chest pain.  No shortness of breath.  No nausea or vomiting.  Pleased to see some initial blood sugar improvements.  Questions answered to verbalized satisfaction.    Physical Exam/Objective:  Temp:  [97.4  F (36.3  C)-98.4  F (36.9  C)] 98.4  F (36.9  C)  Pulse:  [] 97  Resp:  [16-20] 16  BP: (126-154)/(59-73) 135/73  SpO2:  [96 %-100 %] 98 %  Wt Readings from Last 4 Encounters:   07/30/22 64 kg (141 lb 1.6 oz)   07/11/22 59 kg (130 lb)   07/09/22 65.8 kg (145 lb)   07/07/22 67.5 kg (148 lb 14.4 oz)     Body mass index is 23.48 kg/m .    Constitutional: awake, alert, cooperative, no apparent distress, appears older than stated age and protuberant abdomen.  Very jaundiced.  ENT: Normocephalic, without obvious abnormality, atraumatic, external ears without lesions, oral pharynx with moist mucous membranes, tonsils without erythema or exudates, gums normal and good dentition.  Respiratory: No increased work of breathing, good air exchange, clear to auscultation bilaterally, no crackles or wheezing  Cardiovascular: Normal apical impulse, regular rate and rhythm, normal S1 and S2, no S3 or S4, and no murmur  noted  GI: No scars, normal bowel sounds, soft, non-distended, non-tender, no masses palpated, no hepatosplenomegally  Skin: jaundice noted  Musculoskeletal: There is no redness, warmth, or swelling of the joints.  Motor strength is 5 out of 5 all extremities bilaterally.  Tone is normal. no lower extremity pitting edema present  Neurologic: Cranial nerves II-XII are grossly intact. Sensory:  Sensory intact  Neuropsychiatric: General: normal, calm and normal eye contact Level of consciousness: alert / normal Affect: normal Orientation: oriented to self, place, time and situation Memory and insight: normal, memory for past and recent events intact and thought process normal      Medications:   Personally Reviewed.  Medications       dextrose  50 mL Intravenous Once     FLUoxetine  40 mg Oral At Bedtime     folic acid  1 mg Oral Daily     furosemide  40 mg Oral BID     insulin aspart  1-10 Units Subcutaneous TID AC     insulin aspart  1-7 Units Subcutaneous At Bedtime     insulin aspart   Subcutaneous Daily with breakfast     insulin aspart   Subcutaneous Daily with lunch     insulin aspart   Subcutaneous Daily with supper     insulin glargine  50 Units Subcutaneous At Bedtime     insulin NPH  10 Units Subcutaneous Daily with supper     insulin NPH  5 Units Subcutaneous QAM AC     lactulose  20 g Oral TID     magnesium oxide  400 mg Oral Daily     multivitamin, therapeutic  1 tablet Oral Daily     pantoprazole  40 mg Oral BID     predniSONE  10 mg Oral Daily with supper     rifaximin  550 mg Oral BID     sodium chloride (PF)  10-40 mL Intracatheter Q7 Days     sodium chloride (PF)  3 mL Intracatheter Q8H     thiamine  100 mg Oral Daily     vancomycin  1,000 mg Intravenous Q12H       Data reviewed today: I personally reviewed all new medications, labs, imaging/diagnostics reports over the past 24 hours. Pertinent findings include:    Imaging:   No results found for this or any previous visit (from the past 24  hour(s)).    Labs:  Echocardiogram Complete   Final Result        Recent Results (from the past 24 hour(s))   Glucose by meter    Collection Time: 07/29/22 11:06 AM   Result Value Ref Range    GLUCOSE BY METER POCT 407 (H) 70 - 99 mg/dL   Basic metabolic panel    Collection Time: 07/29/22 11:50 AM   Result Value Ref Range    Sodium 129 (L) 136 - 145 mmol/L    Potassium 4.9 3.5 - 5.0 mmol/L    Chloride 90 (L) 98 - 107 mmol/L    Carbon Dioxide (CO2) 28 22 - 31 mmol/L    Anion Gap 11 5 - 18 mmol/L    Urea Nitrogen 40 (H) 8 - 22 mg/dL    Creatinine 0.74 0.70 - 1.30 mg/dL    Calcium 9.1 8.5 - 10.5 mg/dL    Glucose 367 (H) 70 - 125 mg/dL    GFR Estimate >90 >60 mL/min/1.73m2   CBC with platelets    Collection Time: 07/29/22 11:50 AM   Result Value Ref Range    WBC Count 16.9 (H) 4.0 - 11.0 10e3/uL    RBC Count 2.02 (L) 4.40 - 5.90 10e6/uL    Hemoglobin 7.0 (L) 13.3 - 17.7 g/dL    Hematocrit 20.3 (L) 40.0 - 53.0 %     (H) 78 - 100 fL    MCH 34.7 (H) 26.5 - 33.0 pg    MCHC 34.5 31.5 - 36.5 g/dL    RDW      Platelet Count 54 (L) 150 - 450 10e3/uL   Glucose by meter    Collection Time: 07/29/22  1:15 PM   Result Value Ref Range    GLUCOSE BY METER POCT 321 (H) 70 - 99 mg/dL   Glucose by meter    Collection Time: 07/29/22  4:03 PM   Result Value Ref Range    GLUCOSE BY METER POCT 202 (H) 70 - 99 mg/dL   Glucose by meter    Collection Time: 07/29/22  5:18 PM   Result Value Ref Range    GLUCOSE BY METER POCT 267 (H) 70 - 99 mg/dL   Basic metabolic panel    Collection Time: 07/29/22  6:25 PM   Result Value Ref Range    Sodium 128 (L) 136 - 145 mmol/L    Potassium 5.5 (H) 3.5 - 5.0 mmol/L    Chloride 91 (L) 98 - 107 mmol/L    Carbon Dioxide (CO2) 26 22 - 31 mmol/L    Anion Gap 11 5 - 18 mmol/L    Urea Nitrogen 45 (H) 8 - 22 mg/dL    Creatinine 0.78 0.70 - 1.30 mg/dL    Calcium 9.1 8.5 - 10.5 mg/dL    Glucose 396 (H) 70 - 125 mg/dL    GFR Estimate >90 >60 mL/min/1.73m2   Glucose by meter    Collection Time: 07/29/22  7:37 PM    Result Value Ref Range    GLUCOSE BY METER POCT 454 (H) 70 - 99 mg/dL   Lactic Acid STAT    Collection Time: 07/29/22  8:14 PM   Result Value Ref Range    Lactic Acid 1.2 0.7 - 2.0 mmol/L   Glucose by meter    Collection Time: 07/29/22  9:52 PM   Result Value Ref Range    GLUCOSE BY METER POCT 229 (H) 70 - 99 mg/dL   Basic metabolic panel    Collection Time: 07/30/22 12:06 AM   Result Value Ref Range    Sodium 129 (L) 136 - 145 mmol/L    Potassium 5.0 3.5 - 5.0 mmol/L    Chloride 92 (L) 98 - 107 mmol/L    Carbon Dioxide (CO2) 28 22 - 31 mmol/L    Anion Gap 9 5 - 18 mmol/L    Urea Nitrogen 42 (H) 8 - 22 mg/dL    Creatinine 0.76 0.70 - 1.30 mg/dL    Calcium 8.8 8.5 - 10.5 mg/dL    Glucose 191 (H) 70 - 125 mg/dL    GFR Estimate >90 >60 mL/min/1.73m2   Glucose by meter    Collection Time: 07/30/22  2:02 AM   Result Value Ref Range    GLUCOSE BY METER POCT 235 (H) 70 - 99 mg/dL   CBC with platelets    Collection Time: 07/30/22  4:00 AM   Result Value Ref Range    WBC Count 16.9 (H) 4.0 - 11.0 10e3/uL    RBC Count 1.84 (L) 4.40 - 5.90 10e6/uL    Hemoglobin 6.5 (LL) 13.3 - 17.7 g/dL    Hematocrit 18.6 (L) 40.0 - 53.0 %     (H) 78 - 100 fL    MCH 35.3 (H) 26.5 - 33.0 pg    MCHC 34.9 31.5 - 36.5 g/dL    RDW 22.9 (H) 10.0 - 15.0 %    Platelet Count 51 (L) 150 - 450 10e3/uL   Basic metabolic panel    Collection Time: 07/30/22  4:00 AM   Result Value Ref Range    Sodium 127 (L) 136 - 145 mmol/L    Potassium 5.6 (H) 3.5 - 5.0 mmol/L    Chloride 90 (L) 98 - 107 mmol/L    Carbon Dioxide (CO2) 28 22 - 31 mmol/L    Anion Gap 9 5 - 18 mmol/L    Urea Nitrogen 40 (H) 8 - 22 mg/dL    Creatinine 0.79 0.70 - 1.30 mg/dL    Calcium 8.6 8.5 - 10.5 mg/dL    Glucose 333 (H) 70 - 125 mg/dL    GFR Estimate >90 >60 mL/min/1.73m2   Prepare red blood cells (unit)    Collection Time: 07/30/22  5:15 AM   Result Value Ref Range    CROSSMATCH Compatible     UNIT ABO/RH A Neg     Unit Number [id number]     Unit Status Issued     Blood  Component Type Red Blood Cells     Product Code [id number]     CODING SYSTEM [id number]     UNIT TYPE ISBT 0600     ISSUE DATE AND TIME [id number]    Adult Type and Screen    Collection Time: 07/30/22  5:29 AM   Result Value Ref Range    ABO/RH(D) A NEG     Antibody Screen Negative Negative    SPECIMEN EXPIRATION DATE [id number]    Glucose by meter    Collection Time: 07/30/22  5:54 AM   Result Value Ref Range    GLUCOSE BY METER POCT 334 (H) 70 - 99 mg/dL   Glucose by meter    Collection Time: 07/30/22  8:13 AM   Result Value Ref Range    GLUCOSE BY METER POCT 262 (H) 70 - 99 mg/dL   Glucose by meter    Collection Time: 07/30/22 10:12 AM   Result Value Ref Range    GLUCOSE BY METER POCT 168 (H) 70 - 99 mg/dL       Pending Labs:  Unresulted Labs Ordered in the Past 30 Days of this Admission     Date and Time Order Name Status Description    7/30/2022  8:08 AM TSH with free T4 reflex In process     7/30/2022  5:15 AM Prepare red blood cells (unit) Preliminary     7/28/2022  2:33 PM Blood Culture Line, venous Preliminary     7/28/2022  2:33 PM Blood Culture Line, venous Preliminary           Deni Vergara MD  [facility]  Phone: #[phone]

## 2022-07-30 NOTE — PLAN OF CARE
Goal Outcome Evaluation:    Plan of Care Reviewed With: patient, mother     Patient has been up ambulating in the room.Patient's mother,Leticia has been here all day. He has ambulated to the bathroom and has had 2 stools today. Patient denies any c/o pain at this time. Blood Sugars have been checked every 2 hours and were 262,168, and 235. Patient received 5 units of NPH Insulin before breakfast. along with Sliding Scale and Carb Coverage.Patient is also on a 1800 ml fluid restriction. Patient had a transfusion of 1 unit PRBC last night and will have a hemoglobin recheck in the morning. Skin and scleras continue to be jaundice in color. Will continue to moniotr.

## 2022-07-30 NOTE — PHARMACY-CONSULT NOTE
Pharmacy Consult Note - Potential Causes of Hyponatremia    1. Fluoxetine - Most likely potential medication cause. Selective serotonin reuptake inhibitors (SSRIs) are associated with syndrome of inappropriate antidiuretic hormone secretion (SIADH) and/or hyponatremia, including severe cases. Hyponatremia is reversible with discontinuation of therapy.   2. Pantoprazole - only case reports in post-marketing. Not a likely cause but possible.     Thank you for consulting Pharmacy. Please reach out for any further questions.     Guy Altamirano RPH, PharmD, BCPS 7/30/2022 9:15 AM

## 2022-07-30 NOTE — PLAN OF CARE
Goal: Optimal Comfort and Wellbeing  Outcome: Ongoing, Progressing     Problem: Hyperglycemia  Goal: Blood Glucose Level Within Targeted Range  Outcome: Ongoing, Progressing     Problem: Electrolyte Imbalance  Goal: Electrolyte Balance  Outcome: Ongoing, Progressing     Pt denies pain. BG is improving, educated the family and pt, were more complainant with looking at carb intake. Gave pt a one time dose of 10 units for BG of 454 at start of shift. Pt also triggered sepsis protocol, no significant changes. Pt ST on tele. Critical Hgb of 6.5 this AM, consent was obtained and type and screen currently in process. Pt resting, will continue to monitor.

## 2022-07-31 LAB
ANION GAP SERPL CALCULATED.3IONS-SCNC: 7 MMOL/L (ref 5–18)
BLD PROD TYP BPU: NORMAL
BLD PROD TYP BPU: NORMAL
BLOOD COMPONENT TYPE: NORMAL
BLOOD COMPONENT TYPE: NORMAL
BUN SERPL-MCNC: 31 MG/DL (ref 8–22)
CALCIUM SERPL-MCNC: 8.4 MG/DL (ref 8.5–10.5)
CHLORIDE BLD-SCNC: 93 MMOL/L (ref 98–107)
CO2 SERPL-SCNC: 30 MMOL/L (ref 22–31)
CODING SYSTEM: NORMAL
CODING SYSTEM: NORMAL
CREAT SERPL-MCNC: 0.64 MG/DL (ref 0.7–1.3)
CROSSMATCH: NORMAL
CROSSMATCH: NORMAL
GFR SERPL CREATININE-BSD FRML MDRD: >90 ML/MIN/1.73M2
GLUCOSE BLD-MCNC: 259 MG/DL (ref 70–125)
GLUCOSE BLDC GLUCOMTR-MCNC: 119 MG/DL (ref 70–99)
GLUCOSE BLDC GLUCOMTR-MCNC: 153 MG/DL (ref 70–99)
GLUCOSE BLDC GLUCOMTR-MCNC: 163 MG/DL (ref 70–99)
GLUCOSE BLDC GLUCOMTR-MCNC: 187 MG/DL (ref 70–99)
GLUCOSE BLDC GLUCOMTR-MCNC: 312 MG/DL (ref 70–99)
GLUCOSE BLDC GLUCOMTR-MCNC: 81 MG/DL (ref 70–99)
HGB BLD-MCNC: 6.8 G/DL (ref 13.3–17.7)
ISSUE DATE AND TIME: NORMAL
ISSUE DATE AND TIME: NORMAL
PLATELET # BLD AUTO: 48 10E3/UL (ref 150–450)
POTASSIUM BLD-SCNC: 4.1 MMOL/L (ref 3.5–5)
SODIUM SERPL-SCNC: 130 MMOL/L (ref 136–145)
UNIT ABO/RH: NORMAL
UNIT ABO/RH: NORMAL
UNIT NUMBER: NORMAL
UNIT NUMBER: NORMAL
UNIT STATUS: NORMAL
UNIT STATUS: NORMAL
UNIT TYPE ISBT: 600
UNIT TYPE ISBT: 600
VANCOMYCIN SERPL-MCNC: 17.6 MG/L

## 2022-07-31 PROCEDURE — 250N000012 HC RX MED GY IP 250 OP 636 PS 637: Performed by: FAMILY MEDICINE

## 2022-07-31 PROCEDURE — 120N000004 HC R&B MS OVERFLOW

## 2022-07-31 PROCEDURE — 250N000013 HC RX MED GY IP 250 OP 250 PS 637: Performed by: INTERNAL MEDICINE

## 2022-07-31 PROCEDURE — 80202 ASSAY OF VANCOMYCIN: CPT | Performed by: INTERNAL MEDICINE

## 2022-07-31 PROCEDURE — 250N000011 HC RX IP 250 OP 636: Performed by: FAMILY MEDICINE

## 2022-07-31 PROCEDURE — 80048 BASIC METABOLIC PNL TOTAL CA: CPT | Performed by: FAMILY MEDICINE

## 2022-07-31 PROCEDURE — P9016 RBC LEUKOCYTES REDUCED: HCPCS | Performed by: FAMILY MEDICINE

## 2022-07-31 PROCEDURE — 258N000003 HC RX IP 258 OP 636: Performed by: INTERNAL MEDICINE

## 2022-07-31 PROCEDURE — 85049 AUTOMATED PLATELET COUNT: CPT | Performed by: FAMILY MEDICINE

## 2022-07-31 PROCEDURE — 250N000011 HC RX IP 250 OP 636: Performed by: INTERNAL MEDICINE

## 2022-07-31 PROCEDURE — 85018 HEMOGLOBIN: CPT | Performed by: FAMILY MEDICINE

## 2022-07-31 PROCEDURE — 250N000013 HC RX MED GY IP 250 OP 250 PS 637: Performed by: FAMILY MEDICINE

## 2022-07-31 PROCEDURE — 99232 SBSQ HOSP IP/OBS MODERATE 35: CPT | Performed by: FAMILY MEDICINE

## 2022-07-31 RX ORDER — PROCHLORPERAZINE MALEATE 5 MG
5 TABLET ORAL EVERY 6 HOURS PRN
Status: DISCONTINUED | OUTPATIENT
Start: 2022-07-31 | End: 2022-08-02 | Stop reason: HOSPADM

## 2022-07-31 RX ORDER — HYDROXYZINE HYDROCHLORIDE 25 MG/1
25 TABLET, FILM COATED ORAL EVERY 6 HOURS PRN
Status: DISCONTINUED | OUTPATIENT
Start: 2022-07-31 | End: 2022-08-02 | Stop reason: HOSPADM

## 2022-07-31 RX ORDER — FUROSEMIDE 10 MG/ML
20 INJECTION INTRAMUSCULAR; INTRAVENOUS ONCE
Status: COMPLETED | OUTPATIENT
Start: 2022-07-31 | End: 2022-07-31

## 2022-07-31 RX ORDER — SPIRONOLACTONE 25 MG/1
100 TABLET ORAL DAILY
Status: DISCONTINUED | OUTPATIENT
Start: 2022-07-31 | End: 2022-08-01

## 2022-07-31 RX ADMIN — ONDANSETRON 4 MG: 4 TABLET, ORALLY DISINTEGRATING ORAL at 08:39

## 2022-07-31 RX ADMIN — VANCOMYCIN HYDROCHLORIDE 1000 MG: 5 INJECTION, POWDER, LYOPHILIZED, FOR SOLUTION INTRAVENOUS at 21:20

## 2022-07-31 RX ADMIN — PREDNISONE 10 MG: 5 TABLET ORAL at 17:10

## 2022-07-31 RX ADMIN — PANTOPRAZOLE SODIUM 40 MG: 20 TABLET, DELAYED RELEASE ORAL at 08:46

## 2022-07-31 RX ADMIN — SPIRONOLACTONE 100 MG: 25 TABLET, FILM COATED ORAL at 08:46

## 2022-07-31 RX ADMIN — LACTULOSE 20 G: 10 SOLUTION ORAL at 14:27

## 2022-07-31 RX ADMIN — FLUOXETINE 40 MG: 20 CAPSULE ORAL at 20:21

## 2022-07-31 RX ADMIN — FUROSEMIDE 40 MG: 40 TABLET ORAL at 20:21

## 2022-07-31 RX ADMIN — PANTOPRAZOLE SODIUM 40 MG: 20 TABLET, DELAYED RELEASE ORAL at 20:21

## 2022-07-31 RX ADMIN — FUROSEMIDE 20 MG: 10 INJECTION, SOLUTION INTRAVENOUS at 10:28

## 2022-07-31 RX ADMIN — ONDANSETRON 4 MG: 4 TABLET, ORALLY DISINTEGRATING ORAL at 15:40

## 2022-07-31 RX ADMIN — FUROSEMIDE 40 MG: 40 TABLET ORAL at 08:45

## 2022-07-31 RX ADMIN — RIFAXIMIN 550 MG: 550 TABLET ORAL at 08:45

## 2022-07-31 RX ADMIN — Medication 100 MG: at 10:31

## 2022-07-31 RX ADMIN — Medication 400 MG: at 10:30

## 2022-07-31 RX ADMIN — RIFAXIMIN 550 MG: 550 TABLET ORAL at 20:21

## 2022-07-31 RX ADMIN — FOLIC ACID 1 MG: 1 TABLET ORAL at 10:31

## 2022-07-31 RX ADMIN — VANCOMYCIN HYDROCHLORIDE 1000 MG: 5 INJECTION, POWDER, LYOPHILIZED, FOR SOLUTION INTRAVENOUS at 08:21

## 2022-07-31 RX ADMIN — THERA TABS 1 TABLET: TAB at 10:30

## 2022-07-31 ASSESSMENT — ACTIVITIES OF DAILY LIVING (ADL)
ADLS_ACUITY_SCORE: 20

## 2022-07-31 NOTE — PHARMACY-VANCOMYCIN DOSING SERVICE
Pharmacy Vancomycin Note  Date of Service 2022  Patient's  1995   26 year old, male    Indication: Bacteremia  Day of Therapy: 4  Current vancomycin regimen:  1000 mg IV q12h  Current vancomycin monitoring method: AUC  Current vancomycin therapeutic monitoring goal: 400-600 mg*h/L    InsightRX Prediction of Current Vancomycin Regimen  Regimen: 1000 mg IV every 12 hours.  Start time: 08:34 on 2022  Exposure target: AUC24 (range)400-600 mg/L.hr   AUC24,ss: 455 mg/L.hr  Probability of AUC24 > 400: 78 %  Ctrough,ss: 13.5 mg/L  Probability of Ctrough,ss > 20: 3 %  Probability of nephrotoxicity (Lodise MANUEL ): 9 %    Current estimated CrCl = Estimated Creatinine Clearance: 166.5 mL/min (A) (based on SCr of 0.64 mg/dL (L)).    Creatinine for last 3 days  2022: 10:25 AM Creatinine 0.88 mg/dL; 12:21 PM Creatinine 0.85 mg/dL;  3:02 PM Creatinine 0.92 mg/dL;  7:46 PM Creatinine 0.74 mg/dL  2022:  4:52 AM Creatinine 0.80 mg/dL;  8:10 AM Creatinine 0.71 mg/dL; 11:50 AM Creatinine 0.74 mg/dL;  6:25 PM Creatinine 0.78 mg/dL  2022: 12:06 AM Creatinine 0.76 mg/dL;  4:00 AM Creatinine 0.79 mg/dL;  1:25 PM Creatinine 0.68 mg/dL  2022:  5:59 AM Creatinine 0.64 mg/dL    Recent Vancomycin Levels (past 3 days)  2022:  5:59 AM Vancomycin 17.6 mg/L    Vancomycin IV Administrations (past 72 hours)                   vancomycin 1000 mg in 0.9% NaCl 250 mL intermittent infusion 1,000 mg (mg) 1,000 mg New Bag 229     1,000 mg New Bag  1036     1,000 mg New Bag 22 215     1,000 mg New Bag  1152    vancomycin 1250 mg in 0.9% NaCl 250 mL intermittent infusion 1,250 mg (mg) 1,250 mg New Bag 22 2214                Nephrotoxins and other renal medications (From now, onward)    Start     Dose/Rate Route Frequency Ordered Stop    22 0730  furosemide (LASIX) injection 20 mg         20 mg  over 1-3 Minutes Intravenous ONCE 22 0724      22 0900  furosemide (LASIX)  tablet 40 mg        Note to Pharmacy: PTA Sig:Take 1 tablet (40 mg) by mouth 2 times daily      40 mg Oral 2 TIMES DAILY 07/28/22 1937 07/29/22 0900  vancomycin 1000 mg in 0.9% NaCl 250 mL intermittent infusion 1,000 mg         1,000 mg  over 60 Minutes Intravenous EVERY 12 HOURS 07/28/22 2002               Contrast Orders - past 72 hours (72h ago, onward)    Start     Dose/Rate Route Frequency Stop    07/29/22 0905  perflutren lipid microsphere (DEFINITY) injection SUSP 3 mL         3 mL Intravenous ONCE 07/29/22 0905          Interpretation of levels and current regimen:  Vancomycin level is reflective of -600    Has serum creatinine changed greater than 50% in last 72 hours: No    Urine output:  unable to determine    Renal Function: Stable      Plan:  1. Continue Current Dose  2. Vancomycin monitoring method: AUC  3. Vancomycin therapeutic monitoring goal: 400-600 mg*h/L  4. Pharmacy will check vancomycin levels as appropriate in 3-5 Days.  5. Serum creatinine levels will be ordered daily for the first week of therapy and at least twice weekly for subsequent weeks.    Guy Altamirano [facility]

## 2022-07-31 NOTE — PLAN OF CARE
Problem: Hyperglycemia  Goal: Blood Glucose Level Within Targeted Range  Outcome: Ongoing, Progressing     Problem: Electrolyte Imbalance  Goal: Electrolyte Balance  Outcome: Ongoing, Progressing     Problem: Anemia  Goal: Anemia Symptom Improvement  Outcome: Ongoing, Progressing    Pt denies pain. BG has been in the 200s-300s, most recent being 259. Hgb was never redrawn after getting unit of blood on 7/30, redrew Hgb and it was 7.3. Hgb down to 6.8 and this AM, physician notified. Platelets also down to 48. Pt resting, will continue to monitor.

## 2022-07-31 NOTE — PROGRESS NOTES
Care Management Initial Consult    General Information  Assessment completed with: Patient, Parents, pt  Type of CM/SW Visit: Initial Assessment    Primary Care Provider verified and updated as needed: yes    Communication Assessment  Patient's communication style: spoken language (English or Bilingual)    Hearing Difficulty or Deaf: no   Wear Glasses or Blind: no    Cognitive  Cognitive/Neuro/Behavioral: WDL  Level of Consciousness: alert  Arousal Level: opens eyes spontaneously  Orientation: oriented x 4  Mood/Behavior: flat affect     Speech: slow, clear    Living Environment:   People in home: parent(s)  Mom, Dad  Current living Arrangements: house      Able to return to prior arrangements: yes       Family/Social Support:  Care provided by:    Provides care for:    Marital Status: Single  Parent(s)          Description of Support System:           Current Resources:   Patient receiving home care services: No     Community Resources: None  Equipment currently used at home: none  Supplies currently used at home: None    Employment/Financial:  Employment Status: unemployed             Lifestyle & Psychosocial Needs:  Social Determinants of Health     Tobacco Use: Medium Risk     Smoking Tobacco Use: Former Smoker     Smokeless Tobacco Use: Never Used   Alcohol Use: Not on file   Financial Resource Strain: Not on file   Food Insecurity: Not on file   Transportation Needs: Not on file   Physical Activity: Not on file   Stress: Not on file   Social Connections: Not on file   Intimate Partner Violence: Not on file   Depression: Not at risk     PHQ-2 Score: 1   Housing Stability: Not on file         Chemical Dependency Status:      ETOH          Additional Information:  CM met with the Pt and with mother in room. Pt is currently working on getting SSDI and has the forms submitted as needed. Pt states that he is currently 'in talks' about getting a counlser provider. CM offered to help the Pt with getting an initial  appointment with the Provider, however pt declines this support at this time and is only interested in information. CM provided information as requested. CM to monitor for other discharge needs    Pt and Mother have question about a medication that needed a prior Auth and was not able to be filled. CM speak with Pharmacy liaison as needed.     ALEXANDER Tineo

## 2022-07-31 NOTE — PROVIDER NOTIFICATION
"Provider notified. \"Pt triggering sepsis protocol d/t WBC 16.9 and pulse 106. Did you want to run it?\"      Sepsis protocol does not need to be run. Continue to monitor.   "

## 2022-07-31 NOTE — PLAN OF CARE
Problem: Hyperglycemia  Goal: Blood Glucose Level Within Targeted Range  Outcome: Met     FSBG in targeted range. Given 4 units of sliding scale insulin aspart for meal coverage along with 15 units of insulin NPH. Tolerating consistent carb diet. Alert and oriented x 4. Reports intermittent nausea, PRN Zofran given; effective relief. Up independently in room. Sleeping in between cares.

## 2022-07-31 NOTE — PROGRESS NOTES
[facility] MEDICINE  PROGRESS NOTE     Code Status: Full Code       Identification/Summary:   26 year old male with history of alcohol use disorder sober since April 2022, alcoholic cirrhosis, esophageal varices s/p banding, recurrent anemia, ASD, insulin dependent T2DM and high functioning autism.  7/28 presented to the ED for abnormal labs after receiving his regularly scheduled PRBC infusion. Initial evaluation in the ED notable for BG of 694 and potassium of 6.0.  Normal anion gap.  Hyperkalemia protocol initiated. EKG showing sinus tach. WBC of 19 and procalcitonin elevated to 1.53. Blood cultures NGTD x2. INR elevated to 1.90.  Given intravenous insulin and glucose improved to 37 and potassium 4.7.  7/28 PM received Lantus 42 units but following morning sugars up over 500 and potassium of 6.2.  Nephrology and diabetic nurse educator consults appreciated.  Initiated on NPH insulin and increase Lantus with improvement in sugars.  Patient doing a lot of snacking.  Hemoglobin 7.3 after PRBC but not back down to 6.8.  Transfused 2 more units.     Assessment and Plan:  Hyperosmotic hyperglycemic nonketotic state  Insulin-dependent type 2 diabetes.    Noncompliance with diabetic diet  Patient denies noncompliance with insulin but states he has not been following a diabetic diet and having many sweet drinks.  Does not feel well when his blood sugars are below 150.  Admission sugars over 600.  7/27 in the ED patient received NovoLog 30 units subcu and 5.67 units IV.  Subsequent glucose readings as low as 37.  7/27 PM received Lantus 45 units subcutaneous and mealtime NovoLog 2 units  7/28 early a.m. sugars up to 555.  Sliding scale changed to high resistant and mealtime NovoLog 1 unit per 10 g of carbs ordered, Lantus increased to 50 units nightly and given NPH 5 units with evening prednisone.  Greatly appreciate diabetic educator consultation.  7/29 sugars improved to 262.  Given  NPH 5 units with breakfast and increase evening to 10 units with dinner prednisone.  7/30 increase Lantus to 55 units nightly.  However sugars increased associated with patient's continued snacking.  7/31 increase Lantus to 58 units nightly and NPH 8 units with breakfast and 15 units with dinner prednisone dose.  Monitor response.  Lengthy discussion held regarding watching carbohydrates with snacking.  Hyperkalemia   Admission potassium 6.4.  Given calcium gluconate, sodium bicarb, and insulin as above.  Potassium improved temporarily to 4.7.  7/29 recheck potassium up to 6.2.  Nephrology consulted.  Recommend correction of his hyperglycemia and should then lead to potassium improvement.  Subsequent potassium 5.5--> 5--> 5.6--> 4.9.  Follow BMP daily.  Hyponatremia  Admission sodium 123 but this would correct to 133 due to his hyperglycemia.  Anticipate improvement with glucose improvement.  7/30 sodium 127 but corrected up to 131.  Trial fluid restriction 1800 mL per 24 hours.    Follow BMP closely and thyroid level normal TSH 1.09.  Alcoholic cirrhosis  Hold PTA spironolactone due to hyperkalemia.  Continue PTA furosemide, rifaximin and lactulose.  We will continue prednisone 10 mg nightly to avoid hypotension.  Continue PTA folic acid, thiamine and Protonix.  Close follow-up with [facility] gastroenterology will be key.  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  Regularly receives PRBC infusions Mondays, Thursdays 7/28 hemoglobin 6.9--> 7.2--> 8.3--> 7--> 6.5.--PRBC x1--> 7.3--> 6.8  7/31 transfuse PRBC x2 this time with Lasix in between.  Continue to monitor with daily CBC, transfuse if Hgb <7.0.  Elevated procal  Chronic leukocytosis  New 4/6 holosystolic murmur  History of S. Aureus bacteremia May 2022  S. Aureus bacteremia noted on blood cultures 5/23/22, echo performed with no evidence of vegetations or significant stenosis/regurg of any  valves (5/26/22)  Blood cultures drawn NGTD x2.  Given empiric vancomycin.  Echocardiogram EF 60 to 65%.  Normal RV SF and no hemodynamically significant valvular abnormalities.  Admission white count 18.7--> 19.9--> 16.9.  Is on chronic prednisone.  No further checks indicated.  Chronic thrombocytopenia   Holding stable in the mid 50 range.  Continue to monitor with daily platelet level   High functional autism.  Depression  -Continue PTA fluoxetine   -Trazodone ordered for sleep   Mother is present and supportive.     COVID-19 status negative from 7/28/2022  Vaccination: x3  Standard precautions.  Elevated INR  Anticoagulation  Admission INR 1.87.  Slightly above his usual baseline around 1.7.  Follow daily INR.     Fluids:  Saline lock  Pain meds: Gabapentin as needed  Therapy: na  Negron:Not present  Current Diet  Orders Placed This Encounter      Moderate Consistent Carb (60 g CHO per Meal) Diet    Supplements  Active Nourishment Order   Procedures     Fluid restriction 1800 ML FLUID     Barriers to Discharge: Hyperglycemia, anemia requiring transfusion    Disposition: Hopeful discharge 8/1    Interval History/Subjective:  Patient's blood sugars overnight came up to the 300s.  This morning it was 259.  Patient has been doing lots of snacking of chips etc. rather than eating regular meals.  No chest pain.  No shortness of breath.  No nausea or vomiting.  Mother is present and supportive.  Open to education regarding low carbohydrate diet.  Questions answered to verbalized satisfaction.    Physical Exam/Objective:  Temp:  [97.6  F (36.4  C)-98.5  F (36.9  C)] 98.4  F (36.9  C)  Pulse:  [] 90  Resp:  [18] 18  BP: (135-156)/(63-90) 154/81  SpO2:  [96 %-99 %] 96 %  Wt Readings from Last 4 Encounters:   07/31/22 68.5 kg (151 lb 0.2 oz)   07/11/22 59 kg (130 lb)   07/09/22 65.8 kg (145 lb)   07/07/22 67.5 kg (148 lb 14.4 oz)     Body mass index is 25.13 kg/m .    Constitutional: awake, alert, cooperative, no  apparent distress, appears older than stated age and protuberant abdomen with significant extremity atrophy.  Profoundly jaundiced.  ENT: Jaundiced sclera bilaterally stable, Normocephalic, without obvious abnormality, atraumatic, external ears without lesions, oral pharynx with moist mucous membranes, tonsils without erythema or exudates, gums normal and good dentition.  Respiratory: No increased work of breathing, good air exchange, clear to auscultation bilaterally, no crackles or wheezing  Cardiovascular: Normal apical impulse, regular rate and rhythm, normal S1 and S2, no S3 or S4, and no murmur noted  GI: No scars, normal bowel sounds, soft, non-tender, no masses palpated, no hepatosplenomegally and mildly distended abdomen  Skin: jaundice noted  Musculoskeletal: There is no redness, warmth, or swelling of the joints.  Motor strength is 5 out of 5 all extremities bilaterally.  Tone is normal. no lower extremity pitting edema present  Neurologic: Cranial nerves II-XII are grossly intact. Sensory:  Sensory intact  Neuropsychiatric: General: normal, calm and normal eye contact Level of consciousness: alert / normal Affect: normal Orientation: oriented to self, place, time and situation Memory and insight: normal, memory for past and recent events intact and thought process normal      Medications:   Personally Reviewed.  Medications       dextrose  50 mL Intravenous Once     FLUoxetine  40 mg Oral At Bedtime     folic acid  1 mg Oral Daily     furosemide  40 mg Oral BID     insulin aspart  1-10 Units Subcutaneous TID AC     insulin aspart  1-7 Units Subcutaneous At Bedtime     insulin aspart   Subcutaneous Daily with breakfast     insulin aspart   Subcutaneous Daily with lunch     insulin aspart   Subcutaneous Daily with supper     insulin glargine  58 Units Subcutaneous At Bedtime     insulin NPH  15 Units Subcutaneous Daily with supper     insulin NPH  8 Units Subcutaneous QAM AC     lactulose  20 g Oral TID      magnesium oxide  400 mg Oral Daily     multivitamin, therapeutic  1 tablet Oral Daily     pantoprazole  40 mg Oral BID     predniSONE  10 mg Oral Daily with supper     rifaximin  550 mg Oral BID     sodium chloride (PF)  10-40 mL Intracatheter Q7 Days     sodium chloride (PF)  3 mL Intracatheter Q8H     spironolactone  100 mg Oral Daily     thiamine  100 mg Oral Daily     vancomycin  1,000 mg Intravenous Q12H       Data reviewed today: I personally reviewed all new medications, labs, imaging/diagnostics reports over the past 24 hours. Pertinent findings include:    Imaging:   No results found for this or any previous visit (from the past 24 hour(s)).    Labs:  Echocardiogram Complete   Final Result        Recent Results (from the past 24 hour(s))   Glucose by meter    Collection Time: 07/30/22 12:19 PM   Result Value Ref Range    GLUCOSE BY METER POCT 235 (H) 70 - 99 mg/dL   Basic metabolic panel    Collection Time: 07/30/22  1:25 PM   Result Value Ref Range    Sodium 129 (L) 136 - 145 mmol/L    Potassium 4.9 3.5 - 5.0 mmol/L    Chloride 92 (L) 98 - 107 mmol/L    Carbon Dioxide (CO2) 27 22 - 31 mmol/L    Anion Gap 10 5 - 18 mmol/L    Urea Nitrogen 37 (H) 8 - 22 mg/dL    Creatinine 0.68 (L) 0.70 - 1.30 mg/dL    Calcium 8.7 8.5 - 10.5 mg/dL    Glucose 310 (H) 70 - 125 mg/dL    GFR Estimate >90 >60 mL/min/1.73m2   Glucose by meter    Collection Time: 07/30/22  4:04 PM   Result Value Ref Range    GLUCOSE BY METER POCT 357 (H) 70 - 99 mg/dL   Glucose by meter    Collection Time: 07/30/22  6:01 PM   Result Value Ref Range    GLUCOSE BY METER POCT 233 (H) 70 - 99 mg/dL   Hemoglobin and hematocrit    Collection Time: 07/30/22  8:29 PM   Result Value Ref Range    Hemoglobin 7.3 (L) 13.3 - 17.7 g/dL    Hematocrit 20.3 (L) 40.0 - 53.0 %   Glucose by meter    Collection Time: 07/30/22  9:30 PM   Result Value Ref Range    GLUCOSE BY METER POCT 336 (H) 70 - 99 mg/dL   Glucose by meter    Collection Time: 07/31/22  2:45 AM    Result Value Ref Range    GLUCOSE BY METER POCT 312 (H) 70 - 99 mg/dL   Hemoglobin    Collection Time: 07/31/22  5:59 AM   Result Value Ref Range    Hemoglobin 6.8 (LL) 13.3 - 17.7 g/dL   Platelet count    Collection Time: 07/31/22  5:59 AM   Result Value Ref Range    Platelet Count 48 (LL) 150 - 450 10e3/uL   Vancomycin level    Collection Time: 07/31/22  5:59 AM   Result Value Ref Range    Vancomycin 17.6   mg/L   Basic metabolic panel    Collection Time: 07/31/22  5:59 AM   Result Value Ref Range    Sodium 130 (L) 136 - 145 mmol/L    Potassium 4.1 3.5 - 5.0 mmol/L    Chloride 93 (L) 98 - 107 mmol/L    Carbon Dioxide (CO2) 30 22 - 31 mmol/L    Anion Gap 7 5 - 18 mmol/L    Urea Nitrogen 31 (H) 8 - 22 mg/dL    Creatinine 0.64 (L) 0.70 - 1.30 mg/dL    Calcium 8.4 (L) 8.5 - 10.5 mg/dL    Glucose 259 (H) 70 - 125 mg/dL    GFR Estimate >90 >60 mL/min/1.73m2   Prepare red blood cells (unit)    Collection Time: 07/31/22  7:30 AM   Result Value Ref Range    CROSSMATCH Compatible     UNIT ABO/RH A Neg     Unit Number [id number]     Unit Status Issued     Blood Component Type Red Blood Cells     Product Code [id number]     CODING SYSTEM [id number]     UNIT TYPE ISBT 0600     ISSUE DATE AND TIME [id number]    Prepare red blood cells (unit)    Collection Time: 07/31/22  7:30 AM   Result Value Ref Range    CROSSMATCH Compatible     UNIT ABO/RH A Neg     Unit Number [id number]     Unit Status Issued     Blood Component Type Red Blood Cells     Product Code [id number]     CODING SYSTEM [id number]     UNIT TYPE ISBT 0600     ISSUE DATE AND TIME [id number]    Glucose by meter    Collection Time: 07/31/22  8:14 AM   Result Value Ref Range    GLUCOSE BY METER POCT 163 (H) 70 - 99 mg/dL       Pending Labs:  Unresulted Labs Ordered in the Past 30 Days of this Admission     Date and Time Order Name Status Description    7/31/2022  7:30 AM Prepare red blood cells (unit) Preliminary     7/31/2022  7:30 AM Prepare red blood cells  (unit) Preliminary     7/28/2022  2:33 PM Blood Culture Line, venous Preliminary     7/28/2022  2:33 PM Blood Culture Line, venous Preliminary             Deni Vergara MD  [facility]  Phone: #[phone]

## 2022-07-31 NOTE — PLAN OF CARE
Denies complaints.  2 units prbc given this AM.  PICC dressing changed.  Peripheral Ivs removed.  Possible discharge tomorrow      Problem: Plan of Care - These are the overarching goals to be used throughout the patient stay.    Goal: Plan of Care Review/Shift Note  Description: The Plan of Care Review/Shift note should be completed every shift.  The Outcome Evaluation is a brief statement about your assessment that the patient is improving, declining, or no change.  This information will be displayed automatically on your shift note.  Outcome: Ongoing, Progressing  Flowsheets (Taken 7/31/2022 1501)  Plan of Care Reviewed With:   patient   mother  Outcome Evaluation: 2units prbc infused this am, glucose values improved.  Possible DC tomorrow  Overall Patient Progress: improving  Goal: Absence of Hospital-Acquired Illness or Injury  Intervention: Identify and Manage Fall Risk  Recent Flowsheet Documentation  Taken 7/31/2022 0801 by Vicky Romero RN  Safety Promotion/Fall Prevention: clutter free environment maintained  Intervention: Prevent Skin Injury  Recent Flowsheet Documentation  Taken 7/31/2022 0801 by Vicky Romero RN  Body Position: position changed independently  Intervention: Prevent and Manage VTE (Venous Thromboembolism) Risk  Recent Flowsheet Documentation  Taken 7/31/2022 0801 by Vicky Romero RN  Activity Management: activity adjusted per tolerance     Problem: Hyperglycemia  Goal: Blood Glucose Level Within Targeted Range  Intervention: Optimize Glycemic Control  Recent Flowsheet Documentation  Taken 7/31/2022 0801 by Vicky Romero, RN  Glycemic Management: blood glucose monitored     Problem: Electrolyte Imbalance  Goal: Electrolyte Balance  Outcome: Ongoing, Progressing     Problem: Anemia  Goal: Anemia Symptom Improvement  Outcome: Ongoing, Progressing  Intervention: Monitor and Manage Anemia  Recent Flowsheet Documentation  Taken 7/31/2022 0801 by Vicky Romero RN  Safety  Promotion/Fall Prevention: clutter free environment maintained   Goal Outcome Evaluation:    Plan of Care Reviewed With: patient, mother     Overall Patient Progress: improving    Outcome Evaluation: 2units prbc infused this am, glucose values improved.  Possible DC tomorrow

## 2022-08-01 ENCOUNTER — CARE COORDINATION (OUTPATIENT)
Dept: BEHAVIORAL HEALTH | Facility: CLINIC | Age: 27
End: 2022-08-01

## 2022-08-01 LAB
ANION GAP SERPL CALCULATED.3IONS-SCNC: 9 MMOL/L (ref 5–18)
BUN SERPL-MCNC: 29 MG/DL (ref 8–22)
CALCIUM SERPL-MCNC: 8.4 MG/DL (ref 8.5–10.5)
CHLORIDE BLD-SCNC: 92 MMOL/L (ref 98–107)
CO2 SERPL-SCNC: 28 MMOL/L (ref 22–31)
CREAT SERPL-MCNC: 0.67 MG/DL (ref 0.7–1.3)
GFR SERPL CREATININE-BSD FRML MDRD: >90 ML/MIN/1.73M2
GLUCOSE BLD-MCNC: 156 MG/DL (ref 70–125)
GLUCOSE BLDC GLUCOMTR-MCNC: 115 MG/DL (ref 70–99)
GLUCOSE BLDC GLUCOMTR-MCNC: 169 MG/DL (ref 70–99)
GLUCOSE BLDC GLUCOMTR-MCNC: 262 MG/DL (ref 70–99)
GLUCOSE BLDC GLUCOMTR-MCNC: 333 MG/DL (ref 70–99)
GLUCOSE BLDC GLUCOMTR-MCNC: 37 MG/DL (ref 70–99)
GLUCOSE BLDC GLUCOMTR-MCNC: 66 MG/DL (ref 70–99)
GLUCOSE BLDC GLUCOMTR-MCNC: 77 MG/DL (ref 70–99)
HGB BLD-MCNC: 8 G/DL (ref 13.3–17.7)
PLATELET # BLD AUTO: 53 10E3/UL (ref 150–450)
POTASSIUM BLD-SCNC: 5.1 MMOL/L (ref 3.5–5)
SODIUM SERPL-SCNC: 129 MMOL/L (ref 136–145)

## 2022-08-01 PROCEDURE — 250N000012 HC RX MED GY IP 250 OP 636 PS 637: Performed by: INTERNAL MEDICINE

## 2022-08-01 PROCEDURE — 250N000011 HC RX IP 250 OP 636: Performed by: INTERNAL MEDICINE

## 2022-08-01 PROCEDURE — 120N000004 HC R&B MS OVERFLOW

## 2022-08-01 PROCEDURE — 250N000013 HC RX MED GY IP 250 OP 250 PS 637: Performed by: FAMILY MEDICINE

## 2022-08-01 PROCEDURE — 85049 AUTOMATED PLATELET COUNT: CPT | Performed by: FAMILY MEDICINE

## 2022-08-01 PROCEDURE — 258N000003 HC RX IP 258 OP 636: Performed by: INTERNAL MEDICINE

## 2022-08-01 PROCEDURE — 80048 BASIC METABOLIC PNL TOTAL CA: CPT | Performed by: FAMILY MEDICINE

## 2022-08-01 PROCEDURE — 99233 SBSQ HOSP IP/OBS HIGH 50: CPT | Performed by: INTERNAL MEDICINE

## 2022-08-01 PROCEDURE — 250N000013 HC RX MED GY IP 250 OP 250 PS 637: Performed by: INTERNAL MEDICINE

## 2022-08-01 PROCEDURE — 85018 HEMOGLOBIN: CPT | Performed by: FAMILY MEDICINE

## 2022-08-01 RX ORDER — PREDNISONE 5 MG/1
10 TABLET ORAL DAILY
Status: DISCONTINUED | OUTPATIENT
Start: 2022-08-01 | End: 2022-08-02 | Stop reason: HOSPADM

## 2022-08-01 RX ORDER — SPIRONOLACTONE 25 MG/1
50 TABLET ORAL DAILY
Status: DISCONTINUED | OUTPATIENT
Start: 2022-08-02 | End: 2022-08-02 | Stop reason: HOSPADM

## 2022-08-01 RX ADMIN — SPIRONOLACTONE 100 MG: 25 TABLET, FILM COATED ORAL at 08:17

## 2022-08-01 RX ADMIN — RIFAXIMIN 550 MG: 550 TABLET ORAL at 21:27

## 2022-08-01 RX ADMIN — FUROSEMIDE 40 MG: 40 TABLET ORAL at 08:17

## 2022-08-01 RX ADMIN — FLUOXETINE 40 MG: 20 CAPSULE ORAL at 21:26

## 2022-08-01 RX ADMIN — FOLIC ACID 1 MG: 1 TABLET ORAL at 08:17

## 2022-08-01 RX ADMIN — Medication 400 MG: at 08:17

## 2022-08-01 RX ADMIN — PANTOPRAZOLE SODIUM 40 MG: 20 TABLET, DELAYED RELEASE ORAL at 21:26

## 2022-08-01 RX ADMIN — Medication 100 MG: at 08:17

## 2022-08-01 RX ADMIN — PREDNISONE 10 MG: 5 TABLET ORAL at 12:42

## 2022-08-01 RX ADMIN — THERA TABS 1 TABLET: TAB at 08:17

## 2022-08-01 RX ADMIN — VANCOMYCIN HYDROCHLORIDE 1000 MG: 5 INJECTION, POWDER, LYOPHILIZED, FOR SOLUTION INTRAVENOUS at 08:34

## 2022-08-01 RX ADMIN — FUROSEMIDE 40 MG: 40 TABLET ORAL at 21:26

## 2022-08-01 RX ADMIN — RIFAXIMIN 550 MG: 550 TABLET ORAL at 08:17

## 2022-08-01 RX ADMIN — PANTOPRAZOLE SODIUM 40 MG: 20 TABLET, DELAYED RELEASE ORAL at 08:17

## 2022-08-01 ASSESSMENT — ACTIVITIES OF DAILY LIVING (ADL)
ADLS_ACUITY_SCORE: 20

## 2022-08-01 NOTE — SIGNIFICANT EVENT
@1050 Blood glucose 37 pt alert and oriented up ambulating in room. Juice and snacks provided.   @1110 BG recheck 66.  Provider at bedside discussing with patient. Changes made to available insulin.   Will monitor and continue plan of care.

## 2022-08-01 NOTE — PROGRESS NOTES
8/1 Diabetes Care: Note most recent BG trends. Consider dosing steroid and NPH in the AM compared to evening to avoid low BG in AM. One dose of NPH should suffice. Continue Lantus in PM - would not increase dose again until BG stabilized in AM. Thank you, Zuleika Garcia RDN, LD, diabetes educator

## 2022-08-01 NOTE — PLAN OF CARE
Problem: Plan of Care - These are the overarching goals to be used throughout the patient stay.    Goal: Absence of Hospital-Acquired Illness or Injury  Intervention: Identify and Manage Fall Risk  Recent Flowsheet Documentation  Taken 8/1/2022 0800 by Elizabeth Jensen RN  Safety Promotion/Fall Prevention:    patient and family education    nonskid shoes/slippers when out of bed    mobility aid in reach    lighting adjusted    increased rounding and observation    increase visualization of patient    fall prevention program maintained    activity supervised     Problem: Hyperglycemia  Goal: Blood Glucose Level Within Targeted Range  Outcome: Ongoing, Progressing   Patient alert and oriented. Up independently in room. BG has been improving and now hypoglycemic. VSS. LS clear. Abdomen round distended had multiple BM. Refused Lactulose. Anticipate discharge tomorrow pending stable blood glucose. Will monitor and continue plan of care.

## 2022-08-01 NOTE — PROGRESS NOTES
Care Management Follow Up    Length of Stay (days): 4    Expected Discharge Date: 08/02/2022     Concerns to be Addressed:       Patient plan of care discussed at interdisciplinary rounds: Yes    Anticipated Discharge Disposition: Home     Anticipated Discharge Services:    Anticipated Discharge DME:      Patient/family educated on Medicare website which has current facility and service quality ratings:    Education Provided on the Discharge Plan:    Patient/Family in Agreement with the Plan: yes    Referrals Placed by CM/SW:    Private pay costs discussed: Not applicable    Additional Information:  This writer called Pharmacy at [phone] to follow up on prior auth on medication prior auth.  spoke to looked at sticky notes on 7/29 by Noa Sanderson CPHT that states that meds Lanuts costs $3.00, Novolog / humolog ate $3.00 and Free Sstyle Jerad is covered 100%. Pharmacy was consulted and a pharmacy note responded on 7/30/20.      Tomas Gomez

## 2022-08-01 NOTE — PROGRESS NOTES
[facility] Medicine PROGRESS NOTE      Identification/Summary:      Dillon Salter is a 26 year old male with a past medical history of alcohol use disorder sober since April 2022, alcoholic liver cirrhosis, esophageal varices status post banding, recurrent anemia requiring transfusion, autism spectrum disorder, IDDM 2 who was admitted on 7/28/2022 for hyperkalemia, severe hyperglycemia.  No evidence of DKA.  Hyperkalemia corrected for protocol, hyperglycemia treated as well with adjustment in insulin.  Leukocytosis with elevated procalcitonin, new murmur, recent history of MSSA bacteremia also empirically treated with vancomycin, blood cultures negative, vancomycin discontinued.  Lantus has been adjusted, NPH started due to steroid intake, blood sugars have improved but now hypoglycemic.  Patient received transfusion for drop in hemoglobin to 6.8..     Assessment & Plan      DM2, insulin-dependent with a hyperosmolar hyper glycemia on presentation  Nonadherence to diabetic diet  Wide variations in blood glucose  Lantus is increased to 58 units, NPH was started at 15 units in p.m. and 8 units in a.m. now blood sugars have trended down and his hypoglycemic in the morning of 8/1/2022  Change prednisone 10 mg in a.m., previously was getting it in PMs  Decrease Lantus to 50 units nightly  Discontinue p.m. NPH  Increase a.m. 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: Decreasing Aldactone, continue furosemide, rifaximin, lactulose  Recurrent anemia: Status post 2 unit PRBC transfusion, chronically transfusion  "dependent  History of staff aureus bacteremia May 2022, new 4/6 murmur, chronic leukocytosis, elevated procalcitonin  Empirically treated with vancomycin, blood cultures negative, echocardiogram unremarkable, now off antibiotics    High functioning autism  Depression: Continue fluoxetine, trazodone    Clinically Significant Risk Factors Present on Admission                        Diet: Moderate Consistent Carb (60 g CHO per Meal) Diet  Fluid restriction 1800 ML FLUID  DVT Prophylaxis: High risk for bleeding, recurrent drop in hemoglobin, ambulatory  Negron Catheter: Not present  Central Lines: PRESENT  PICC Triple Lumen 07/28/22 Right Basilic medications-Site Assessment: WDL    Code Status: Full Code    Discharge Planning   Anticipated Discharge in :1 day  Expected Discharge Destination: Home   Milestones/Criteria For Discharge: Stable blood glucose    Disposition Plan      Expected Discharge Date: 08/02/2022,  9:00 AM      Discharge Comments: Needs stable BGs and Hgbs          The patient's care was discussed with the Bedside Nurse, Patient and Patient's Family.      Interval History/Subjective:     Was feeling sweaty/tremulous, blood sugar low at 37, got some juice, feels better, denies any chest pain or shortness of breath abdominal pain or nausea or vomiting  Reports that he is feeling much better compared to when he came in    Physical Exam/Objective:     Vitals I/O   Vital signs:  Temp: 97.8  F (36.6  C) Temp src: Oral BP: (!) 141/73 Pulse: 92   Resp: 16 SpO2: 100 % O2 Device: None (Room air)     Weight: 65.6 kg (144 lb 11.2 oz)  Estimated body mass index is 24.08 kg/m  as calculated from the following:    Height as of 7/11/22: 1.651 m (5' 5\").    Weight as of this encounter: 65.6 kg (144 lb 11.2 oz).       I/O last 3 completed shifts:  In: 2495 [P.O.:1620; I.V.:250]  Out: 0      Vitals:    07/30/22 0400 07/31/22 0631 08/01/22 0353   Weight: 64 kg (141 lb 1.6 oz) 68.5 kg (151 lb 0.2 oz) 65.6 kg (144 lb 11.2 oz) "      Weight change: -2.864 kg (-6 lb 5 oz)   Body mass index is 24.08 kg/m .    General: Awake alert and comfortable  Lungs: CTA without rales or rhonchi  Heart: S1, S2, systolic murmur,   Abdomen: Soft, distended, nontender, bowel sounds positive  CNS: Nonfocal  Extremities: No edema      Medications:     Medications       dextrose  50 mL Intravenous Once     FLUoxetine  40 mg Oral At Bedtime     folic acid  1 mg Oral Daily     furosemide  40 mg Oral BID     insulin aspart  1-10 Units Subcutaneous TID AC     insulin aspart  1-7 Units Subcutaneous At Bedtime     insulin aspart   Subcutaneous Daily with breakfast     insulin aspart   Subcutaneous Daily with lunch     insulin aspart   Subcutaneous Daily with supper     insulin glargine  50 Units Subcutaneous At Bedtime     [START ON 8/2/2022] insulin NPH  10 Units Subcutaneous QAM AC     lactulose  20 g Oral TID     magnesium oxide  400 mg Oral Daily     multivitamin, therapeutic  1 tablet Oral Daily     pantoprazole  40 mg Oral BID     predniSONE  10 mg Oral Daily     rifaximin  550 mg Oral BID     sodium chloride (PF)  10-40 mL Intracatheter Q7 Days     sodium chloride (PF)  3 mL Intracatheter Q8H     spironolactone  100 mg Oral Daily     thiamine  100 mg Oral Daily     vancomycin  1,000 mg Intravenous Q12H       Data Reviewed   I personally reviewed all new medications, labs, imaging/diagnostics reports over the past 24 hours.     Pertinent findings include potassium 5.1, sodium 129.     Labs    Recent Labs   Lab 08/01/22  0711 08/01/22  0455 08/01/22  0014 07/31/22  0814 07/31/22  0559 07/30/22  2130 07/30/22  2029 07/30/22  1604 07/30/22  1325 07/30/22  0554 07/30/22  0400 07/29/22  1315 07/29/22  1150 07/29/22  0623 07/29/22  0452 07/28/22  1510 07/28/22  1502 07/28/22  1221 07/28/22  1025   WBC  --   --   --   --   --   --   --   --   --   --  16.9*  --  16.9*  --  17.0*  --  19.9* 19.6* 18.7*   HGB  --  8.0*  --   --  6.8*  --  7.3*  --   --   --  6.5*  --   7.0*  --  7.0*  --  8.3* 7.2* 6.9*   MCV  --   --   --   --   --   --   --   --   --   --  101*  --  101*  --  101*  --  101* 102* 105*   PLT  --  53*  --   --  48*  --   --   --   --   --  51*  --  54*  --  54*  --  70* 58* 66*   INR  --   --   --   --   --   --   --   --   --   --   --   --   --   --   --   --  1.90*  --  1.87*   NA  --  129*  --   --  130*  --   --   --  129*  --  127*   < > 129*   < > 127*   < > 123* 123* 123*   POTASSIUM  --  5.1*  --   --  4.1  --   --   --  4.9  --  5.6*   < > 4.9   < > 6.2*   < > 6.0* 6.4* 6.2*   CHLORIDE  --  92*  --   --  93*  --   --   --  92*  --  90*   < > 90*   < > 87*   < > 83* 82* 82*   CO2  --  28  --   --  30  --   --   --  27  --  28   < > 28   < > 31   < > 33* 31 31   BUN  --  29*  --   --  31*  --   --   --  37*  --  40*   < > 40*   < > 44*   < > 47* 47* 49*   CR  --  0.67*  --   --  0.64*  --   --   --  0.68*  --  0.79   < > 0.74   < > 0.80   < > 0.92 0.85 0.88   ANIONGAP  --  9  --   --  7  --   --   --  10  --  9   < > 11   < > 9   < > 7 10 10   SARTHAK  --  8.4*  --   --  8.4*  --   --   --  8.7  --  8.6   < > 9.1   < > 9.3   < > 10.0 9.8 10.0   GLC 77 156* 115*   < > 259*   < >  --    < > 310*   < > 333*   < > 367*   < > 555*   < > 647* 694* 630*   ALBUMIN  --   --   --   --   --   --   --   --   --   --   --   --   --   --   --   --  3.4* 3.2* 3.4*   PROTTOTAL  --   --   --   --   --   --   --   --   --   --   --   --   --   --   --   --  8.1* 7.8 8.3*   BILITOTAL  --   --   --   --   --   --   --   --   --   --   --   --   --   --   --   --  24.5* 21.7* 22.9*   ALKPHOS  --   --   --   --   --   --   --   --   --   --   --   --   --   --   --   --  303* 300* 304*   ALT  --   --   --   --   --   --   --   --   --   --   --   --   --   --   --   --  59* 57* 59*   AST  --   --   --   --   --   --   --   --   --   --   --   --   --   --   --   --  86* 80* 87*   LIPASE  --   --   --   --   --   --   --   --   --   --   --   --   --   --   --   --  35  --   --     <  > = values in this interval not displayed.       Imaging   No results found for this or any previous visit (from the past 24 hour(s)).      EKG:      Nidhi Willett MD  Internal Medicine / [facility] medicine   [facility]  Securely message with the Vocera Web Console (learn more here)  Text page via [employer] ([employer])

## 2022-08-01 NOTE — PLAN OF CARE
Problem: Anemia  Goal: Anemia Symptom Improvement  Outcome: Ongoing, Progressing     Problem: Electrolyte Imbalance  Goal: Electrolyte Balance  Outcome: Ongoing, Progressing     Problem: Hyperglycemia  Goal: Blood Glucose Level Within Targeted Range  Outcome: Ongoing, Progressing     Pt denies pain. BG has been in the 100s. Pt did not complain of any nausea. K was 5.1 and Hgb was 8.0 this AM.

## 2022-08-01 NOTE — PROGRESS NOTES
Received VM from Leticia updating me that Dillon is back at [facility] for uncontrolled blood sugar (see EMR and labs) over last weekend. Indicated she would touch base with me regarding recommendation from MARC Woody for appropriate ROBERT OP treatment group attendance for him.     This has been an ongoing challenge (getting an ROBERT assessment and recommendation) for several months now. Essentially the challenge will be for Dillon to now commit to attend an OP ROBERT program and successfully complete it. His RITO alcohol is reported as 4/6/22. He will need to demonstrate commitment to sobriety and willingness to follow recommendations of tx. program.     MARC Park

## 2022-08-02 VITALS
RESPIRATION RATE: 16 BRPM | OXYGEN SATURATION: 99 % | WEIGHT: 149 LBS | TEMPERATURE: 97.8 F | DIASTOLIC BLOOD PRESSURE: 70 MMHG | SYSTOLIC BLOOD PRESSURE: 134 MMHG | BODY MASS INDEX: 24.79 KG/M2 | HEART RATE: 93 BPM

## 2022-08-02 LAB
ANION GAP SERPL CALCULATED.3IONS-SCNC: 8 MMOL/L (ref 5–18)
BACTERIA BLD CULT: NO GROWTH
BACTERIA BLD CULT: NO GROWTH
BUN SERPL-MCNC: 33 MG/DL (ref 8–22)
CALCIUM SERPL-MCNC: 8.8 MG/DL (ref 8.5–10.5)
CHLORIDE BLD-SCNC: 92 MMOL/L (ref 98–107)
CO2 SERPL-SCNC: 30 MMOL/L (ref 22–31)
CREAT SERPL-MCNC: 0.63 MG/DL (ref 0.7–1.3)
ERYTHROCYTE [DISTWIDTH] IN BLOOD BY AUTOMATED COUNT: 21.8 % (ref 10–15)
GFR SERPL CREATININE-BSD FRML MDRD: >90 ML/MIN/1.73M2
GLUCOSE BLD-MCNC: 155 MG/DL (ref 70–125)
GLUCOSE BLDC GLUCOMTR-MCNC: 133 MG/DL (ref 70–99)
GLUCOSE BLDC GLUCOMTR-MCNC: 267 MG/DL (ref 70–99)
GLUCOSE BLDC GLUCOMTR-MCNC: 360 MG/DL (ref 70–99)
GLUCOSE BLDC GLUCOMTR-MCNC: 54 MG/DL (ref 70–99)
HCT VFR BLD AUTO: 22.3 % (ref 40–53)
HGB BLD-MCNC: 7.9 G/DL (ref 13.3–17.7)
INR PPP: 1.85 (ref 0.85–1.15)
MCH RBC QN AUTO: 32.9 PG (ref 26.5–33)
MCHC RBC AUTO-ENTMCNC: 35.4 G/DL (ref 31.5–36.5)
MCV RBC AUTO: 93 FL (ref 78–100)
PLATELET # BLD AUTO: 60 10E3/UL (ref 150–450)
POTASSIUM BLD-SCNC: 4.7 MMOL/L (ref 3.5–5)
RBC # BLD AUTO: 2.4 10E6/UL (ref 4.4–5.9)
SODIUM SERPL-SCNC: 130 MMOL/L (ref 136–145)
WBC # BLD AUTO: 16.2 10E3/UL (ref 4–11)

## 2022-08-02 PROCEDURE — 250N000013 HC RX MED GY IP 250 OP 250 PS 637: Performed by: INTERNAL MEDICINE

## 2022-08-02 PROCEDURE — 85027 COMPLETE CBC AUTOMATED: CPT | Performed by: INTERNAL MEDICINE

## 2022-08-02 PROCEDURE — 85610 PROTHROMBIN TIME: CPT | Performed by: INTERNAL MEDICINE

## 2022-08-02 PROCEDURE — 83735 ASSAY OF MAGNESIUM: CPT | Performed by: INTERNAL MEDICINE

## 2022-08-02 PROCEDURE — 99238 HOSP IP/OBS DSCHRG MGMT 30/<: CPT | Performed by: INTERNAL MEDICINE

## 2022-08-02 PROCEDURE — 250N000012 HC RX MED GY IP 250 OP 636 PS 637: Performed by: INTERNAL MEDICINE

## 2022-08-02 PROCEDURE — 36592 COLLECT BLOOD FROM PICC: CPT | Performed by: FAMILY MEDICINE

## 2022-08-02 PROCEDURE — 80048 BASIC METABOLIC PNL TOTAL CA: CPT | Performed by: FAMILY MEDICINE

## 2022-08-02 RX ORDER — INSULIN GLARGINE 100 [IU]/ML
50 INJECTION, SOLUTION SUBCUTANEOUS AT BEDTIME
Qty: 3 ML | Refills: 0 | Status: ON HOLD | OUTPATIENT
Start: 2022-08-02 | End: 2022-08-16

## 2022-08-02 RX ORDER — SPIRONOLACTONE 100 MG/1
50 TABLET, FILM COATED ORAL DAILY
Start: 2022-08-02 | End: 2022-08-16

## 2022-08-02 RX ORDER — INSULIN ASPART 100 [IU]/ML
INJECTION, SOLUTION INTRAVENOUS; SUBCUTANEOUS
Qty: 15 ML | Refills: 3
Start: 2022-08-02

## 2022-08-02 RX ADMIN — Medication 100 MG: at 09:12

## 2022-08-02 RX ADMIN — LACTULOSE 20 G: 10 SOLUTION ORAL at 09:12

## 2022-08-02 RX ADMIN — FUROSEMIDE 40 MG: 40 TABLET ORAL at 09:12

## 2022-08-02 RX ADMIN — FOLIC ACID 1 MG: 1 TABLET ORAL at 09:12

## 2022-08-02 RX ADMIN — PANTOPRAZOLE SODIUM 40 MG: 20 TABLET, DELAYED RELEASE ORAL at 09:12

## 2022-08-02 RX ADMIN — PREDNISONE 10 MG: 5 TABLET ORAL at 09:12

## 2022-08-02 RX ADMIN — RIFAXIMIN 550 MG: 550 TABLET ORAL at 09:11

## 2022-08-02 RX ADMIN — SPIRONOLACTONE 50 MG: 25 TABLET, FILM COATED ORAL at 09:12

## 2022-08-02 RX ADMIN — Medication 400 MG: at 09:12

## 2022-08-02 RX ADMIN — THERA TABS 1 TABLET: TAB at 09:12

## 2022-08-02 ASSESSMENT — ACTIVITIES OF DAILY LIVING (ADL)
ADLS_ACUITY_SCORE: 20

## 2022-08-02 NOTE — PLAN OF CARE
Problem: Plan of Care - These are the overarching goals to be used throughout the patient stay.    Goal: Readiness for Transition of Care  Outcome: Adequate for Care Transition   Discharge plan reviewed with patient. Copy of AVS given. Questions answered. Mom at bedside. All belonging with patient. Discharged home with mom provide transportation.

## 2022-08-02 NOTE — PLAN OF CARE
Pt A&Ox4. Up independently in the room. BG improving. Refused lactulose. Potassium stable range. Anticipating discharge today if BG stays stable. Will monitor and continue plan of care.    Problem: Electrolyte Imbalance  Goal: Electrolyte Balance  Outcome: Ongoing, Progressing   Goal Outcome Evaluation:

## 2022-08-02 NOTE — DISCHARGE SUMMARY
[facility] MEDICINE  DISCHARGE SUMMARY     Primary Care Physician: Gary Rodrigues  Admission Date: 7/28/2022   Discharge Provider: Nidhi Willett MD Discharge Date: 8/2/2022   Diet:   Active Diet and Nourishment Order   Procedures     Fluid restriction 1800 ML FLUID     Moderate Consistent Carb (60 g CHO per Meal) Diet     Diet       Code Status: Full Code   Activity: DCACTIVITY: Activity as tolerated        Condition at Discharge: Good     REASON FOR PRESENTATION(See Admission Note for Details)   High potassium and blood sugar    PRINCIPAL & ACTIVE DISCHARGE DIAGNOSES   DM2, insulin-dependent with a severe hyperglycemia  Hyperkalemia  Active Problems:    Alcoholic cirrhosis of liver with ascites (H)    Diabetes mellitus type 2 in obese (H)    Asperger's disorder    Anemia, unspecified type    Hyperkalemia    Hyponatremia    Hyperglycemia    SIRS (systemic inflammatory response syndrome) (H)  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  The following labs/tests are recommended:   Hemoglobin and BMP .           May consider titrating Aldactone dose higher based on serum potassium level, avoid hyperkalemia      DISPOSITION     Home    SUMMARY OF HOSPITAL COURSE:         Dillon Salter is a 26 year old male with a past medical history of alcohol use disorder sober since April 2022, alcoholic liver cirrhosis,  esophageal varices status post banding, recurrent anemia requiring transfusion, autism spectrum disorder, IDDM 2 who was admitted on 7/28/2022 for hyperkalemia, severe hyperglycemia.  No evidence of DKA.  Hyperkalemia corrected for protocol, hyperglycemia treated as well with adjustment in insulin.  Leukocytosis with elevated procalcitonin, new murmur, recent history of MSSA bacteremia also empirically treated with vancomycin, blood cultures negative, vancomycin discontinued.  Lantus changed to 50 units daily, aspart 1 unit per 10 g of carb plus sliding scale insulin.  NPH was started in the hospital, discontinue to simplify the treatment regimen.  Patient is on a short course of prednisone, at follow-up with the oncology they will decide whether to continue prednisone 10 mg daily.  He will continue his twice weekly hemoglobin checks.  She did have BMP check at least once a week as well.   Spironolactone decreased to 50 mg daily given hyperkalemia    Discharge Medications with Med changes:     Current Discharge Medication List      CONTINUE these medications which have CHANGED    Details   insulin aspart (NOVOLOG FLEXPEN) 100 UNIT/ML pen 1 unit per 10 grams of carb, plus additional units based on following scale   For Pre-Meal  - 164 give 1 unit. For Pre-Meal  - 189 give 2 units. For Pre-Meal  - 214 give 3 units. For Pre-Meal  - 239 give 4 units. For Pre-Meal  - 264 give 5 units. For Pre-Meal  - 289 give 6 units. For Pre-Meal  - 314 give 7 units. For Pre-Meal  - 339 give 8 units. For Pre-Meal  - 364 give 9 units.  For Pre-Meal BG greater than or equal to 365 give 10 units  Qty: 15 mL, Refills: 3    Associated Diagnoses: Diabetes mellitus type 2 in obese (H)      insulin glargine (LANTUS SOLOSTAR) 100 UNIT/ML pen Inject 50 Units Subcutaneous At Bedtime  Qty: 3 mL, Refills: 0    Comments: If Lantus is not covered by insurance, may substitute Basaglar or Semglee or  other insulin glargine product per insurance preference at same dose and frequency.    Associated Diagnoses: Diabetes mellitus type 2 in obese (H)      spironolactone (ALDACTONE) 100 MG tablet Take 0.5 tablets (50 mg) by mouth daily    Associated Diagnoses: Alcoholic cirrhosis of liver with ascites (H)         CONTINUE these medications which have NOT CHANGED    Details   alcohol swab prep pads Use to swab area of injection/marsha as directed.  Qty: 100 each, Refills: 3    Associated Diagnoses: Diabetes mellitus type 2 in obese (H)      blood glucose (NO BRAND SPECIFIED) test strip Use to test blood sugar 4 times daily or as directed. To accompany: Blood Glucose Monitor Brands: per insurance.  Qty: 100 strip, Refills: 6    Associated Diagnoses: Diabetes mellitus type 2 in obese (H)      blood glucose monitoring (NO BRAND SPECIFIED) meter device kit Use to test blood sugar 4 times daily or as directed. Preferred blood glucose meter OR supplies to accompany: Blood Glucose Monitor Brands: per insurance.  Qty: 1 kit, Refills: 0    Associated Diagnoses: Diabetes mellitus type 2 in obese (H)      fluconazole (DIFLUCAN) 150 MG tablet Take 150 mg by mouth daily as needed Patient to use for 7 days if yeast infection flare up.      FLUoxetine (PROZAC) 40 MG capsule Take 40 mg by mouth At Bedtime      folic acid (FOLVITE) 1 MG tablet Take 1 tablet (1 mg) by mouth daily  Qty: 90 tablet, Refills: 1    Associated Diagnoses: Alcoholic cirrhosis of liver with ascites (H)      furosemide (LASIX) 40 MG tablet Take 1 tablet (40 mg) by mouth 2 times daily  Qty: 30 tablet, Refills: 3    Associated Diagnoses: Alcoholic cirrhosis of liver with ascites (H)      gabapentin (NEURONTIN) 100 MG capsule Take 100 mg by mouth daily as needed      insulin pen needle (ULTICARE MICRO) 32G X 4 MM miscellaneous Use pen needles daily or as directed.  Qty: 100 each, Refills: 3    Associated Diagnoses: Diabetes mellitus type 2 in obese (H)      lactulose  (CHRONULAC) 10 GM/15ML solution Take 30 mLs (20 g) by mouth 4 times daily  Qty: 1892 mL, Refills: 1    Associated Diagnoses: Alcoholic cirrhosis of liver with ascites (H)      multivitamin, therapeutic (THERA-VIT) TABS tablet Take 1 tablet by mouth in the morning.    Associated Diagnoses: Alcoholic cirrhosis of liver with ascites (H)      pantoprazole (PROTONIX) 40 MG EC tablet Take 1 tablet (40 mg) by mouth 2 times daily  Qty: 30 tablet, Refills: 0    Associated Diagnoses: Alcohol intoxication with delirium (H)      predniSONE (DELTASONE) 10 MG tablet Take 1 tablet (10 mg) by mouth daily  Qty: 30 tablet, Refills: 0    Associated Diagnoses: Alcoholic cirrhosis of liver with ascites (H)      thiamine (B-1) 100 MG tablet Take 1 tablet (100 mg) by mouth in the morning.  Qty: 30 tablet, Refills: 0    Associated Diagnoses: Alcoholic cirrhosis of liver with ascites (H)      thin (NO BRAND SPECIFIED) lancets Use with lanceting device. To accompany: Blood Glucose Monitor Brands: per insurance.  Qty: 100 each, Refills: 6    Associated Diagnoses: Diabetes mellitus type 2 in obese (H)      magnesium oxide (MAG-OX) 400 MG tablet Take 1 tablet (400 mg) by mouth daily  Qty: 60 tablet, Refills: 0    Associated Diagnoses: Alcoholic cirrhosis of liver with ascites (H)      rifaximin (XIFAXAN) 550 MG TABS tablet Take 1 tablet (550 mg) by mouth 2 times daily  Qty: 60 tablet, Refills: 0    Associated Diagnoses: Alcoholic cirrhosis of liver with ascites (H)                   Rationale for medication changes:      Spironolactone decreased due to hyperkalemia  Insulin dosages adjusted to achieve optimal blood glucose levels          Consults       VASCULAR ACCESS ADULT IP CONSULT  PHARMACY TO DOSE VANCO  NEPHROLOGY IP CONSULT  DIABETES EDUCATION IP CONSULT  PHARMACY IP CONSULT  CARE MANAGEMENT / SOCIAL WORK IP CONSULT    Immunizations given this encounter     Most Recent Immunizations   Administered Date(s) Administered      COVID-19,PF,Pfizer (12+ Yrs) 01/10/2022     DTAP (<7y) 08/24/1999     DTP-Hib 03/03/1995     HPV Quadrivalent 12/03/2014     Hep B, Peds or Adolescent 06/29/2007     Influenza (H1N1) 01/19/2010     Influenza (IIV3) PF 10/22/2012     Influenza Vaccine IM > 6 months Valent IIV4 (Alfuria,Fluzone) 01/10/2022     MMR 08/24/1999     Meningococcal (Menactra ) 01/19/2010     OPV, trivalent, live 03/03/1995     Pneumococcal 23 valent 10/22/2012     Tdap (Adacel,Boostrix) 01/03/2017     Varicella 06/29/2007           Anticoagulation Information      Recent INR results:   Recent Labs   Lab 08/02/22  0524 07/28/22  1502 07/28/22  1025   INR 1.85* 1.90* 1.87*     Warfarin doses (if applicable) or name of other anticoagulant:       SIGNIFICANT IMAGING FINDINGS     Results for orders placed or performed during the hospital encounter of 07/28/22   Echocardiogram Complete   Result Value Ref Range    LVEF  60-65%        SIGNIFICANT LABORATORY FINDINGS     Most Recent 3 CBC's:Recent Labs   Lab Test 08/02/22  0524 08/01/22  0455 07/31/22  0559 07/30/22 2029 07/30/22  0400 07/29/22  1150   WBC 16.2*  --   --   --  16.9* 16.9*   HGB 7.9* 8.0* 6.8*   < > 6.5* 7.0*   MCV 93  --   --   --  101* 101*   PLT 60* 53* 48*  --  51* 54*    < > = values in this interval not displayed.     Most Recent 3 BMP's:Recent Labs   Lab Test 08/02/22  0602 08/02/22  0524 08/02/22  0159 08/01/22  0711 08/01/22  0455 07/31/22  0814 07/31/22  0559   NA  --  130*  --   --  129*  --  130*   POTASSIUM  --  4.7  --   --  5.1*  --  4.1   CHLORIDE  --  92*  --   --  92*  --  93*   CO2  --  30  --   --  28  --  30   BUN  --  33*  --   --  29*  --  31*   CR  --  0.63*  --   --  0.67*  --  0.64*   ANIONGAP  --  8  --   --  9  --  7   SARTHAK  --  8.8  --   --  8.4*  --  8.4*   * 155* 267*   < > 156*   < > 259*    < > = values in this interval not displayed.     Most Recent 2 LFT's:Recent Labs   Lab Test 07/28/22  1502 07/28/22  1221   AST 86* 80*   ALT 59* 57*    ALKPHOS 303* 300*   BILITOTAL 24.5* 21.7*     Most Recent 3 INR's:Recent Labs   Lab Test 08/02/22  0524 07/28/22  1502 07/28/22  1025   INR 1.85* 1.90* 1.87*           Discharge Orders        Medication Therapy Management Referral      Reason for your hospital stay    High blood sugar and potassium     Follow-up and recommended labs and tests     Follow up with primary care provider, Gary Rodrigues, within 7 days for hospital follow- up.  The following labs/tests are recommended: Hemoglobin and BMP .     Activity    Your activity upon discharge: activity as tolerated     Diet    Follow this diet upon discharge: Orders Placed This Encounter      Fluid restriction 1800 ML FLUID      Moderate Consistent Carb (60 g CHO per Meal) Diet       Examination   Physical Exam   Temp:  [97.8  F (36.6  C)-98.2  F (36.8  C)] 98.2  F (36.8  C)  Pulse:  [89-92] 92  Resp:  [16] 16  BP: (141-147)/(67-73) 147/67  SpO2:  [97 %-100 %] 97 %  Wt Readings from Last 1 Encounters:   08/02/22 67.6 kg (149 lb)       General: Awake alert and comfortable  Lungs: CTA without rales or rhonchi  Heart: S1, S2, systolic murmur  Abdomen: Soft nontender, bowel sounds positive, protuberant abdomen  CNS: Nonfocal  Extremities: No edema        Please see EMR for more detailed significant labs, imaging, consultant notes etc.    INidhi MD, personally saw the patient today and spent less than or equal to 30 minutes discharging this patient.    Nidhi Willett MD  [facility]    CC:Gary Rodrigues

## 2022-08-03 ENCOUNTER — PATIENT OUTREACH (OUTPATIENT)
Dept: CARE COORDINATION | Facility: CLINIC | Age: 27
End: 2022-08-03

## 2022-08-03 DIAGNOSIS — Z71.89 OTHER SPECIFIED COUNSELING: ICD-10-CM

## 2022-08-03 NOTE — PROGRESS NOTES
Clinic Care Coordination Contact  [facility]: Post-Discharge Note  SITUATION                                                      Admission:    Admission Date: 07/28/22   Reason for Admission: DM2, insulin-dependent with a severe hyperglycemia  Discharge:   Discharge Date: 08/02/22  Discharge Diagnosis: DM2, insulin-dependent with a severe hyperglycemia    BACKGROUND                                                      Per hospital discharge summary and inpatient provider notes:    Dillon Salter is a 26 year old male with a past medical history of alcohol use disorder sober since April 2022, alcoholic liver cirrhosis, esophageal varices status post banding, recurrent anemia requiring transfusion, autism spectrum disorder, IDDM 2 who was admitted on 7/28/2022 for hyperkalemia, severe hyperglycemia.  No evidence of DKA.  Hyperkalemia corrected for protocol, hyperglycemia treated as well with adjustment in insulin.  Leukocytosis with elevated procalcitonin, new murmur, recent history of MSSA bacteremia also empirically treated with vancomycin, blood cultures negative, vancomycin discontinued.  Lantus changed to 50 units daily, aspart 1 unit per 10 g of carb plus sliding scale insulin.  NPH was started in the hospital, discontinue to simplify the treatment regimen.  Patient is on a short course of prednisone, at follow-up with the oncology they will decide whether to continue prednisone 10 mg daily.  He will continue his twice weekly hemoglobin checks.  She did have BMP check at least once a week as well.   Spironolactone decreased to 50 mg daily given hyperkalemia    ASSESSMENT      Enrollment  Primary Care Care Coordination Status: Not a Candidate    Discharge Assessment  How are you doing now that you are home?: Patient reports he is doing well, everything went smooth and does not have any questions  How are your symptoms? (Red Flag symptoms escalate to triage hotline per guidelines): Improved  Do you feel  your condition is stable enough to be safe at home until your provider visit?: Yes  Does the patient have their discharge instructions? : Yes  Does the patient have questions regarding their discharge instructions? : No  Were you started on any new medications or were there changes to any of your previous medications? : Yes  Does the patient have all of their medications?: Yes  Do you have questions regarding any of your medications? : No  Discharge follow-up appointment scheduled within 14 calendar days? : Yes  Discharge Follow Up Appointment Date: 08/04/22  Discharge Follow Up Appointment Scheduled with?: Specialty Care Provider                  PLAN                                                      Outpatient Plan:      Follow-up Appointments     Follow-up and recommended labs and tests       Follow up with primary care provider, Gary Rodrigues, within 7 days   for hospital follow- up.  The following labs/tests are recommended:   Hemoglobin and BMP     Future Appointments   Date Time Provider Department Center   8/4/2022  8:15 AM [facility]   8/4/2022  8:45 AM Tez Riojas MD [facility]   8/4/2022  9:15 AM [facility] INFUSION CHAIR 3 INFT [facility]   8/8/2022  2:45 PM Momo Walters PA-C [facility]   9/26/2022 10:00 AM [facility]         For any urgent concerns, please contact our 24 hour nurse triage line: [phone])         Jesika Willett  [facility]

## 2022-08-04 ENCOUNTER — INFUSION THERAPY VISIT (OUTPATIENT)
Dept: INFUSION THERAPY | Facility: CLINIC | Age: 27
End: 2022-08-04
Attending: INTERNAL MEDICINE
Payer: COMMERCIAL

## 2022-08-04 ENCOUNTER — ONCOLOGY VISIT (OUTPATIENT)
Dept: ONCOLOGY | Facility: CLINIC | Age: 27
End: 2022-08-04
Attending: INTERNAL MEDICINE
Payer: COMMERCIAL

## 2022-08-04 ENCOUNTER — TELEPHONE (OUTPATIENT)
Dept: FAMILY MEDICINE | Facility: CLINIC | Age: 27
End: 2022-08-04

## 2022-08-04 VITALS
SYSTOLIC BLOOD PRESSURE: 138 MMHG | BODY MASS INDEX: 25.39 KG/M2 | DIASTOLIC BLOOD PRESSURE: 68 MMHG | WEIGHT: 152.4 LBS | OXYGEN SATURATION: 94 % | HEIGHT: 65 IN | TEMPERATURE: 98.3 F | HEART RATE: 105 BPM

## 2022-08-04 VITALS
DIASTOLIC BLOOD PRESSURE: 81 MMHG | OXYGEN SATURATION: 96 % | SYSTOLIC BLOOD PRESSURE: 144 MMHG | HEART RATE: 107 BPM | TEMPERATURE: 97.8 F | RESPIRATION RATE: 16 BRPM

## 2022-08-04 DIAGNOSIS — D63.8 ANEMIA IN OTHER CHRONIC DISEASES CLASSIFIED ELSEWHERE: Primary | ICD-10-CM

## 2022-08-04 DIAGNOSIS — K70.31 ALCOHOLIC CIRRHOSIS OF LIVER WITH ASCITES (H): ICD-10-CM

## 2022-08-04 DIAGNOSIS — D62 ACUTE POSTHEMORRHAGIC ANEMIA: Primary | ICD-10-CM

## 2022-08-04 DIAGNOSIS — K70.30 ALCOHOLIC CIRRHOSIS OF LIVER WITHOUT ASCITES (H): ICD-10-CM

## 2022-08-04 DIAGNOSIS — D62 ANEMIA DUE TO BLOOD LOSS, ACUTE: ICD-10-CM

## 2022-08-04 DIAGNOSIS — E83.42 HYPOMAGNESEMIA: ICD-10-CM

## 2022-08-04 LAB
ABO/RH(D): NORMAL
ANTIBODY SCREEN: NEGATIVE
BASOPHILS # BLD AUTO: 0.1 10E3/UL (ref 0–0.2)
BASOPHILS NFR BLD AUTO: 1 %
BLD PROD TYP BPU: NORMAL
BLOOD COMPONENT TYPE: NORMAL
CODING SYSTEM: NORMAL
CROSSMATCH: NORMAL
EOSINOPHIL # BLD AUTO: 0.5 10E3/UL (ref 0–0.7)
EOSINOPHIL NFR BLD AUTO: 3 %
ERYTHROCYTE [DISTWIDTH] IN BLOOD BY AUTOMATED COUNT: 20.4 % (ref 10–15)
HCT VFR BLD AUTO: 20.4 % (ref 40–53)
HGB BLD-MCNC: 7 G/DL (ref 13.3–17.7)
IMM GRANULOCYTES # BLD: 0.5 10E3/UL
IMM GRANULOCYTES NFR BLD: 3 %
ISSUE DATE AND TIME: NORMAL
LYMPHOCYTES # BLD AUTO: 2.8 10E3/UL (ref 0.8–5.3)
LYMPHOCYTES NFR BLD AUTO: 18 %
MAGNESIUM SERPL-MCNC: 1.6 MG/DL (ref 1.8–2.6)
MCH RBC QN AUTO: 32.6 PG (ref 26.5–33)
MCHC RBC AUTO-ENTMCNC: 34.3 G/DL (ref 31.5–36.5)
MCV RBC AUTO: 95 FL (ref 78–100)
MONOCYTES # BLD AUTO: 1.2 10E3/UL (ref 0–1.3)
MONOCYTES NFR BLD AUTO: 7 %
NEUTROPHILS # BLD AUTO: 10.8 10E3/UL (ref 1.6–8.3)
NEUTROPHILS NFR BLD AUTO: 68 %
NRBC # BLD AUTO: 0 10E3/UL
NRBC BLD AUTO-RTO: 0 /100
PLATELET # BLD AUTO: 79 10E3/UL (ref 150–450)
RBC # BLD AUTO: 2.15 10E6/UL (ref 4.4–5.9)
SPECIMEN EXPIRATION DATE: NORMAL
UNIT ABO/RH: NORMAL
UNIT NUMBER: NORMAL
UNIT STATUS: NORMAL
UNIT TYPE ISBT: 9500
WBC # BLD AUTO: 15.9 10E3/UL (ref 4–11)

## 2022-08-04 PROCEDURE — 86850 RBC ANTIBODY SCREEN: CPT | Performed by: INTERNAL MEDICINE

## 2022-08-04 PROCEDURE — 85025 COMPLETE CBC W/AUTO DIFF WBC: CPT | Performed by: INTERNAL MEDICINE

## 2022-08-04 PROCEDURE — 99214 OFFICE O/P EST MOD 30 MIN: CPT | Performed by: INTERNAL MEDICINE

## 2022-08-04 PROCEDURE — 86923 COMPATIBILITY TEST ELECTRIC: CPT | Performed by: INTERNAL MEDICINE

## 2022-08-04 PROCEDURE — G0463 HOSPITAL OUTPT CLINIC VISIT: HCPCS | Mod: 25

## 2022-08-04 PROCEDURE — P9040 RBC LEUKOREDUCED IRRADIATED: HCPCS | Performed by: INTERNAL MEDICINE

## 2022-08-04 PROCEDURE — 36415 COLL VENOUS BLD VENIPUNCTURE: CPT | Performed by: INTERNAL MEDICINE

## 2022-08-04 PROCEDURE — 36430 TRANSFUSION BLD/BLD COMPNT: CPT

## 2022-08-04 RX ORDER — HEPARIN SODIUM (PORCINE) LOCK FLUSH IV SOLN 100 UNIT/ML 100 UNIT/ML
5 SOLUTION INTRAVENOUS
Status: CANCELLED | OUTPATIENT
Start: 2022-08-04

## 2022-08-04 RX ORDER — HEPARIN SODIUM,PORCINE 10 UNIT/ML
5 VIAL (ML) INTRAVENOUS
Status: CANCELLED | OUTPATIENT
Start: 2022-08-04

## 2022-08-04 RX ORDER — FOLIC ACID 1 MG/1
1 TABLET ORAL DAILY
Qty: 90 TABLET | Refills: 1 | Status: ON HOLD | OUTPATIENT
Start: 2022-08-04 | End: 2023-03-17

## 2022-08-04 ASSESSMENT — PAIN SCALES - GENERAL: PAINLEVEL: NO PAIN (0)

## 2022-08-04 NOTE — PROGRESS NOTES
[facility] Hematology and Oncology Progress Note    Patient: Dillon Salter  MRN: [id number]  Date of Service: Aug 4, 2022         Reason for Visit    Chief Complaint   Patient presents with     Hematology     Acute posthemorrhagic anemia,  Hepatic encephalopathy,  Alcoholic cirrhosis of liver without ascites         Assessment and Plan    Cancer Staging  No matching staging information was found for the patient.    ECOG Performance    2 - Ambulatory and independent in all ADLs; cannot work; up > 50% of the time     Pain  Pain Score: No Pain (0)      #.  Recurrent severe anemia on chronic anemia, initially acute blood loss secondary to variceal bleeding followed by mucosal oozing with friable gastric mucosa.  #.  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  Moderate to severe thrombocytopenia, secondary to liver disease  #.  Cirrhosis of the liver with portal hypertension, secondary to alcohol use  #.  Hepatic encephalopathy, resolved     Reviewed labs.  Hemoglobin is 7 today and 7.9 from 2 days ago.  He has persistent mild leukocytosis..  Count is stable around 80.  No reported bleeding.  He has been requiring blood transfusion about 1 unit of week at this point.  There is no meaningful hemoglobin response from steroids.  I recommended him to taper prednisone to 5 mg for 10 days, then stop.     Since his hemoglobin is 7 today, he will receive 1 unit of packed RBC.   I recommended him to continue folic acid daily.    Magnesium level looks okay.  Okay to stop magnesium supplements.   He told me that he does not feel any different between hemoglobin of 6 or 7 g/dL.  We decided to check hemoglobin once a week and transfusion once a week to limit fluid overload and iron overload.  However he is advised to request for additional lab and blood transfusion appointment if clinically symptomatic from anemia.    Advised him to follow-up with the hepatology/GI at [facility] as scheduled.    I defer the  diuretics and rifaximin management for hepatology/GI.    No indication for bone marrow biopsy at this point unless there is concern from other care teams. Follow-up provider visit 4 weeks.    Encounter Diagnoses:    Problem List Items Addressed This Visit     Alcoholic cirrhosis of liver with ascites (H) (Chronic)    Relevant Medications    folic acid (FOLVITE) 1 MG tablet    Anemia due to blood loss, acute    Relevant Medications    folic acid (FOLVITE) 1 MG tablet    Alcoholic cirrhosis of liver without ascites (H)    Anemia in other chronic diseases classified elsewhere - Primary    Relevant Medications    folic acid (FOLVITE) 1 MG tablet      Other Visit Diagnoses     Hypomagnesemia        Relevant Orders    Magnesium (Completed)             CC: Gary Rodrigues MD   ______________________________________________________________________________    History of Present Illness    Mr. Dillon Salter presented today accompanied by his mother.    No bleeding.  He is tired of coming to the clinic twice a week (transfusion.  He did not feel any difference from hemoglobin of mid 6 to 7 g/dL.  He requires about 1 unit of week.  He has not get approval from rifaximin which was started from recent hospitalization.  He is also wondering whether he should continue magnesium at this point.    Review of systems  Apart from describing in HPI, the remainder of comprehensive ROS was negative.    Past History    Past Medical History:   Diagnosis Date     Diabetes (H)        Past Surgical History:   Procedure Laterality Date     ESOPHAGOSCOPY, GASTROSCOPY, DUODENOSCOPY (EGD), COMBINED N/A 5/24/2022    Procedure: ESOPHAGOGASTRODUODENOSCOPY (EGD) with esophageal banding;  Surgeon: Gary Hurst MD;  Location: [facility] OR     ESOPHAGOSCOPY, GASTROSCOPY, DUODENOSCOPY (EGD), COMBINED N/A 6/20/2022    Procedure: ESOPHAGOGASTRODUODENOSCOPY;  Surgeon: Gavino Reza MD;  Location: [facility] OR     MIDLINE  "DOUBLE LUMEN PLACEMENT  5/26/2022          PICC TRIPLE LUMEN PLACEMENT  7/28/2022            Physical Exam    /68 (BP Location: Left arm, Patient Position: Supine, Cuff Size: Adult Small)   Pulse 105   Temp 98.3  F (36.8  C)   Ht 1.651 m (5' 5\")   Wt 69.1 kg (152 lb 6.4 oz)   SpO2 94%   BMI 25.36 kg/m      General: alert, awake, not in acute distress  HEENT: Head: Normal, normocephalic, atraumatic.  Eye: jaundice.  Pharynx: Normal buccal mucosa. Normal pharynx.  Neck / Thyroid: Supple, no masses, nodes, nodules or enlargement.  Lymphatics: No abnormally enlarged lymph nodes.  Chest: Normal chest wall and respirations. Clear to auscultation.  Heart: S1 S2 RRR.   Abdomen: abdomen is soft without significant tenderness, masses, organomegaly or guarding  Extremities: normal strength, tone, and muscle mass  Skin: Jaundiced and ecchymoses.  CNS: non focal.    Lab Results    Recent Results (from the past 168 hour(s))   CBC with platelets   Result Value Ref Range    WBC Count 18.5 (H) 4.0 - 11.0 10e3/uL    RBC Count 1.73 (L) 4.40 - 5.90 10e6/uL    Hemoglobin 6.2 (LL) 13.3 - 17.7 g/dL    Hematocrit 18.0 (L) 40.0 - 53.0 %     (H) 78 - 100 fL    MCH 35.8 (H) 26.5 - 33.0 pg    MCHC 34.4 31.5 - 36.5 g/dL    RDW 23.2 (H) 10.0 - 15.0 %    Platelet Count 98 (L) 150 - 450 10e3/uL   Adult Type and Screen   Result Value Ref Range    ABO/RH(D) A NEG     Antibody Screen Negative Negative    SPECIMEN EXPIRATION DATE [id number]    Prepare red blood cells (unit)   Result Value Ref Range    CROSSMATCH Compatible     UNIT ABO/RH A Neg     Unit Number [id number]     Unit Status Transfused     Blood Component Type Red Blood Cells     Product Code D2099E37     CODING SYSTEM [id number]     UNIT TYPE ISBT 0600     ISSUE DATE AND TIME [id number]        Imaging    Echocardiogram Complete    Result Date: 7/29/2022  [id number]^DAR  [address] [address]  " Name: EYAL ROONEY MRN: [id number] : 1995 Study Date: 2022 08:46 AM Age: 26 yrs Gender: Male Patient Location: [facility] Reason For Study: Murmur, Endocarditis Ordering Physician: DAR QUIROZ Performed By: CM  BSA: 1.7 m2 Height: 65 in Weight: 133 lb HR: 99 ______________________________________________________________________________ Procedure Complete Echo Adult. Definity (NDC #09769-189) given intravenously. ______________________________________________________________________________ Interpretation Summary  Left ventricular size, wall motion and function are normal. The ejection fraction is 60-65%. Normal right ventricle size and systolic function. No hemodynamically significant valvular abnormalities on 2D or color flow imaging. ______________________________________________________________________________ Left Ventricle Left ventricular size, wall motion and function are normal. The ejection fraction is 60-65%. There is borderline concentric left ventricular hypertrophy. Left ventricular diastolic function is normal. No regional wall motion abnormalities noted.  Right Ventricle Normal right ventricle size and systolic function.  Atria Normal left atrial size. Right atrial size is normal. There is no color Doppler evidence of an atrial shunt.  Mitral Valve Mitral valve leaflets appear normal. There is no evidence of mitral stenosis or clinically significant mitral regurgitation.  Tricuspid Valve Right ventricle systolic pressure estimate normal.  Aortic Valve Aortic valve leaflets appear normal. There is no evidence of aortic stenosis or clinically significant aortic regurgitation.  Pulmonic Valve The pulmonic valve is not well seen, but is grossly normal. This degree of valvular regurgitation is within normal limits. Mildly increased PV velocity.  Vessels The aorta root is normal. Normal size ascending aorta. IVC diameter <2.1 cm collapsing >50% with sniff suggests a normal RA pressure of 3  mmHg.  Pericardium There is no pericardial effusion.  ______________________________________________________________________________ MMode/2D Measurements & Calculations IVSd: 1.2 cm LVIDd: 4.6 cm LVIDs: 3.4 cm LVPWd: 1.1 cm  FS: 27.0 % LV mass(C)d: 191.7 grams LV mass(C)dI: 115.3 grams/m2 Ao root diam: 2.5 cm LA dimension: 4.2 cm asc Aorta Diam: 2.7 cm LA/Ao: 1.7 LVOT diam: 2.1 cm LVOT area: 3.3 cm2 LA Volume Indexed (AL/bp): 45.8 ml/m2 RWT: 0.46  Doppler Measurements & Calculations MV E max luca: 106.0 cm/sec MV A max luca: 88.4 cm/sec MV E/A: 1.2  MV dec slope: 1039 cm/sec2 MV dec time: 0.10 sec Ao V2 max: 238.9 cm/sec Ao max P.0 mmHg Ao V2 mean: 134.3 cm/sec Ao mean P.0 mmHg Ao V2 VTI: 40.2 cm THOR(I,D): 2.2 cm2 THOR(V,D): 2.2 cm2 LV V1 max PG: 10.4 mmHg LV V1 max: 161.1 cm/sec LV V1 VTI: 26.2 cm SV(LVOT): 87.2 ml SI(LVOT): 52.4 ml/m2 PA V2 max: 192.7 cm/sec PA max P.9 mmHg PA acc time: 0.08 sec AV Luca Ratio (DI): 0.67 THOR Index (cm2/m2): 1.3 E/E' av.7 Lateral E/e': 7.5 Medial E/e': 9.9  ______________________________________________________________________________ Report approved by: Sofya Saucedo 2022 09:28 AM       US Abdomen Limited    Result Date: 2022  EXAM: US ABDOMEN LIMITED LOCATION: [facility] DATE/TIME: 2022 2:50 PM INDICATION:  Alcoholic cirrhosis of liver with ascites (H) COMPARISON: None. TECHNIQUE: Limited abdominal ultrasound. FINDINGS: No abdominal ascites. Paracentesis not performed.     IMPRESSION: 1.  No ascites.     30 minutes spent on the date of the encounter doing chart review, history and exam, documentation and further activities as noted above.    Signed by: Tez Riojas MD

## 2022-08-04 NOTE — PROGRESS NOTES
"Oncology Rooming Note    August 4, 2022 8:44 AM   Dillon Salter is a 26 year old male who presents for:    Chief Complaint   Patient presents with     Hematology     Acute posthemorrhagic anemia,  Hepatic encephalopathy,  Alcoholic cirrhosis of liver without ascites       Initial Vitals: /68 (BP Location: Left arm, Patient Position: Supine, Cuff Size: Adult Small)   Pulse 105   Temp 98.3  F (36.8  C)   Ht 1.651 m (5' 5\")   Wt 69.1 kg (152 lb 6.4 oz)   SpO2 94%   BMI 25.36 kg/m   Estimated body mass index is 25.36 kg/m  as calculated from the following:    Height as of this encounter: 1.651 m (5' 5\").    Weight as of this encounter: 69.1 kg (152 lb 6.4 oz). Body surface area is 1.78 meters squared.  No Pain (0) Comment: Data Unavailable   No LMP for male patient.  Allergies reviewed: Yes  Medications reviewed: Yes    Medications: MEDICATION REFILLS NEEDED TODAY. Provider was notified.  Pharmacy name entered into [employer]: [id number] [address] [facility]  AT [facility]    Clinical concerns: 1 month follow up with labs and poss blood      Kelsea Yang MA            "

## 2022-08-04 NOTE — PROGRESS NOTES
Infusion Nursing Note:  Dillon Salter presents today for labs and possible blood transfusion.    Patient seen by provider today: Yes: Dr. Riojas   present during visit today: Not Applicable.    Note: Add on Magnesium from 8/2/22 is 1.6. Per Dr. Riojas, Dillon should continue taking his oral magnesium as prescribed. Dillon and Mom, Lteicia, were informed of lab result and Dr. Riojas's instructions. They verbalized understanding.    Intravenous Access:  Peripheral IV placed in right AC.    Treatment Conditions:  Lab Results   Component Value Date    HGB 7.0 (L) 08/04/2022    WBC 15.9 (H) 08/04/2022    ANEU 14.5 (H) 07/28/2022    ANEUTAUTO 10.8 (H) 08/04/2022    PLT 79 (L) 08/04/2022      Results reviewed, labs MET treatment parameters, ok to proceed with treatment.  Per blood product therapy plan, Dillon will receive 1 unit PRBCs for a hgb of 7.  Blood transfusion consent signed 6/27/2022.    Post Infusion Assessment:  Patient tolerated infusion without incident.  Blood return noted pre and post infusion.  Site patent and intact, free from redness, edema or discomfort.  Access discontinued per protocol.     Discharge Plan:   Patient will return August 11th for next appointment.   Patient discharged in stable condition accompanied by: mother.  Departure Mode: Ambulatory.      Zoe Garcia RN

## 2022-08-04 NOTE — TELEPHONE ENCOUNTER
MTM referral from: Transitions of Care (recent hospital discharge or ED visit)    MTM referral outreach attempt #2 on August 4, 2022 at 1:34 PM      Outcome: Patient not reachable after several attempts, will route to [facility] Pharmacist/Provider as an FYI.  [facility] scheduling number is [phone].  Thank you for the referral.    Tali Maldonado [facility]

## 2022-08-04 NOTE — LETTER
"    8/4/2022         RE: Dillon Salter  [address] [facility] [address]        Dear Colleague,    Thank you for referring your patient, Dillon Salter, to the [facility]. Please see a copy of my visit note below.    Oncology Rooming Note    August 4, 2022 8:44 AM   Dillon Salter is a 26 year old male who presents for:    Chief Complaint   Patient presents with     Hematology     Acute posthemorrhagic anemia,  Hepatic encephalopathy,  Alcoholic cirrhosis of liver without ascites       Initial Vitals: /68 (BP Location: Left arm, Patient Position: Supine, Cuff Size: Adult Small)   Pulse 105   Temp 98.3  F (36.8  C)   Ht 1.651 m (5' 5\")   Wt 69.1 kg (152 lb 6.4 oz)   SpO2 94%   BMI 25.36 kg/m   Estimated body mass index is 25.36 kg/m  as calculated from the following:    Height as of this encounter: 1.651 m (5' 5\").    Weight as of this encounter: 69.1 kg (152 lb 6.4 oz). Body surface area is 1.78 meters squared.  No Pain (0) Comment: Data Unavailable   No LMP for male patient.  Allergies reviewed: Yes  Medications reviewed: Yes    Medications: MEDICATION REFILLS NEEDED TODAY. Provider was notified.  Pharmacy name entered into [employer]â„¢ Network: [facility] [employer] [facility] #[id number] [place] [id number] [facility]  [place] [facility]    Clinical concerns: 1 month follow up with labs and poss blood      Kelsea Yang MA              [facility]emat[facility] Oncology Progress Note    Patient: Dillon Salter  MRN: [id number]  Date of Service: Aug 4, 2022         Reason for Visit    Chief Complaint   Patient presents with     Hematology     Acute posthemorrhagic anemia,  Hepatic encephalopathy,  Alcoholic cirrhosis of liver without ascites         Assessment and Plan    Cancer Staging  No matching staging information was found for the patient.    ECOG Performance    2 - Ambulatory and independent in all ADLs; cannot work; up > 50% of the time     Pain  Pain Score: No " Pain (0)      #.  Recurrent severe anemia on chronic anemia, initially acute blood loss secondary to variceal bleeding followed by mucosal oozing with friable gastric mucosa.  #.  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  Moderate to severe thrombocytopenia, secondary to liver disease  #.  Cirrhosis of the liver with portal hypertension, secondary to alcohol use  #.  Hepatic encephalopathy, resolved     Reviewed labs.  Hemoglobin is 7 today and 7.9 from 2 days ago.  He has persistent mild leukocytosis..  Count is stable around 80.  No reported bleeding.  He has been requiring blood transfusion about 1 unit of week at this point.  There is no meaningful hemoglobin response from steroids.  I recommended him to taper prednisone to 5 mg for 10 days, then stop.     Since his hemoglobin is 7 today, he will receive 1 unit of packed RBC.   I recommended him to continue folic acid daily.    Magnesium level looks okay.  Okay to stop magnesium supplements.   He told me that he does not feel any different between hemoglobin of 6 or 7 g/dL.  We decided to check hemoglobin once a week and transfusion once a week to limit fluid overload and iron overload.  However he is advised to request for additional lab and blood transfusion appointment if clinically symptomatic from anemia.    Advised him to follow-up with the hepatology/GI at [facility] as scheduled.    I defer the diuretics and rifaximin management for hepatology/GI.    No indication for bone marrow biopsy at this point unless there is concern from other care teams. Follow-up provider visit 4 weeks.    Encounter Diagnoses:    Problem List Items Addressed This Visit     Alcoholic cirrhosis of liver with ascites (H) (Chronic)    Relevant Medications    folic acid (FOLVITE) 1 MG tablet    Anemia due to blood loss, acute    Relevant Medications    folic acid (FOLVITE) 1 MG tablet    Alcoholic cirrhosis of liver without ascites (H)    Anemia in other chronic  "diseases classified elsewhere - Primary    Relevant Medications    folic acid (FOLVITE) 1 MG tablet      Other Visit Diagnoses     Hypomagnesemia        Relevant Orders    Magnesium (Completed)             CC: Gary Rodrigues MD   ______________________________________________________________________________    History of Present Illness    Mr. Dillon Salter presented today accompanied by his mother.    No bleeding.  He is tired of coming to the clinic twice a week (transfusion.  He did not feel any difference from hemoglobin of mid 6 to 7 g/dL.  He requires about 1 unit of week.  He has not get approval from rifaximin which was started from recent hospitalization.  He is also wondering whether he should continue magnesium at this point.    Review of systems  Apart from describing in HPI, the remainder of comprehensive ROS was negative.    Past History    Past Medical History:   Diagnosis Date     Diabetes (H)        Past Surgical History:   Procedure Laterality Date     ESOPHAGOSCOPY, GASTROSCOPY, DUODENOSCOPY (EGD), COMBINED N/A 5/24/2022    Procedure: ESOPHAGOGASTRODUODENOSCOPY (EGD) with esophageal banding;  Surgeon: Gary Hurst MD;  Location: [facility] OR     ESOPHAGOSCOPY, GASTROSCOPY, DUODENOSCOPY (EGD), COMBINED N/A 6/20/2022    Procedure: ESOPHAGOGASTRODUODENOSCOPY;  Surgeon: Gavino Reza MD;  Location: [facility] OR     MIDLINE DOUBLE LUMEN PLACEMENT  5/26/2022          PICC TRIPLE LUMEN PLACEMENT  7/28/2022            Physical Exam    /68 (BP Location: Left arm, Patient Position: Supine, Cuff Size: Adult Small)   Pulse 105   Temp 98.3  F (36.8  C)   Ht 1.651 m (5' 5\")   Wt 69.1 kg (152 lb 6.4 oz)   SpO2 94%   BMI 25.36 kg/m      General: alert, awake, not in acute distress  HEENT: Head: Normal, normocephalic, atraumatic.  Eye: jaundice.  Pharynx: Normal buccal mucosa. Normal pharynx.  Neck / Thyroid: Supple, no masses, nodes, nodules or " enlargement.  Lymphatics: No abnormally enlarged lymph nodes.  Chest: Normal chest wall and respirations. Clear to auscultation.  Heart: S1 S2 RRR.   Abdomen: abdomen is soft without significant tenderness, masses, organomegaly or guarding  Extremities: normal strength, tone, and muscle mass  Skin: Jaundiced and ecchymoses.  CNS: non focal.    Lab Results    Recent Results (from the past 168 hour(s))   CBC with platelets   Result Value Ref Range    WBC Count 18.5 (H) 4.0 - 11.0 10e3/uL    RBC Count 1.73 (L) 4.40 - 5.90 10e6/uL    Hemoglobin 6.2 (LL) 13.3 - 17.7 g/dL    Hematocrit 18.0 (L) 40.0 - 53.0 %     (H) 78 - 100 fL    MCH 35.8 (H) 26.5 - 33.0 pg    MCHC 34.4 31.5 - 36.5 g/dL    RDW 23.2 (H) 10.0 - 15.0 %    Platelet Count 98 (L) 150 - 450 10e3/uL   Adult Type and Screen   Result Value Ref Range    ABO/RH(D) A NEG     Antibody Screen Negative Negative    SPECIMEN EXPIRATION DATE [id number]    Prepare red blood cells (unit)   Result Value Ref Range    CROSSMATCH Compatible     UNIT ABO/RH A Neg     Unit Number [id number]     Unit Status Transfused     Blood Component Type Red Blood Cells     Product Code P2530G55     CODING SYSTEM [id number]     UNIT TYPE ISBT 0600     ISSUE DATE AND TIME [id number]        Imaging    Echocardiogram Complete    Result Date: 2022  [id number] [facility] [id number]^RICHIE[place] [address]  Name: EYAL ROONEY MRN: [id number] : 1995 Study Date: 2022 08:46 AM Age: 26 yrs Gender: Male Patient Location: [facility] Reason For Study: Murmur, Endocarditis Ordering Physician: DAR QUIROZ Performed By: CM  BSA: 1.7 m2 Height: 65 in Weight: 133 lb HR: 99 ______________________________________________________________________________ Procedure Complete Echo Adult. Definity (NDC #80948-706) given intravenously. ______________________________________________________________________________ Interpretation Summary   Left ventricular size, wall motion and function are normal. The ejection fraction is 60-65%. Normal right ventricle size and systolic function. No hemodynamically significant valvular abnormalities on 2D or color flow imaging. ______________________________________________________________________________ Left Ventricle Left ventricular size, wall motion and function are normal. The ejection fraction is 60-65%. There is borderline concentric left ventricular hypertrophy. Left ventricular diastolic function is normal. No regional wall motion abnormalities noted.  Right Ventricle Normal right ventricle size and systolic function.  Atria Normal left atrial size. Right atrial size is normal. There is no color Doppler evidence of an atrial shunt.  Mitral Valve Mitral valve leaflets appear normal. There is no evidence of mitral stenosis or clinically significant mitral regurgitation.  Tricuspid Valve Right ventricle systolic pressure estimate normal.  Aortic Valve Aortic valve leaflets appear normal. There is no evidence of aortic stenosis or clinically significant aortic regurgitation.  Pulmonic Valve The pulmonic valve is not well seen, but is grossly normal. This degree of valvular regurgitation is within normal limits. Mildly increased PV velocity.  Vessels The aorta root is normal. Normal size ascending aorta. IVC diameter <2.1 cm collapsing >50% with sniff suggests a normal RA pressure of 3 mmHg.  Pericardium There is no pericardial effusion.  ______________________________________________________________________________ MMode/2D Measurements & Calculations IVSd: 1.2 cm LVIDd: 4.6 cm LVIDs: 3.4 cm LVPWd: 1.1 cm  FS: 27.0 % LV mass(C)d: 191.7 grams LV mass(C)dI: 115.3 grams/m2 Ao root diam: 2.5 cm LA dimension: 4.2 cm asc Aorta Diam: 2.7 cm LA/Ao: 1.7 LVOT diam: 2.1 cm LVOT area: 3.3 cm2 LA Volume Indexed (AL/bp): 45.8 ml/m2 RWT: 0.46  Doppler Measurements & Calculations MV E max rebeca: 106.0 cm/sec MV A max rebeca: 88.4  cm/sec MV E/A: 1.2  MV dec slope: 1039 cm/sec2 MV dec time: 0.10 sec Ao V2 max: 238.9 cm/sec Ao max P.0 mmHg Ao V2 mean: 134.3 cm/sec Ao mean P.0 mmHg Ao V2 VTI: 40.2 cm THOR(I,D): 2.2 cm2 THOR(V,D): 2.2 cm2 LV V1 max PG: 10.4 mmHg LV V1 max: 161.1 cm/sec LV V1 VTI: 26.2 cm SV(LVOT): 87.2 ml SI(LVOT): 52.4 ml/m2 PA V2 max: 192.7 cm/sec PA max P.9 mmHg PA acc time: 0.08 sec AV Luca Ratio (DI): 0.67 THOR Index (cm2/m2): 1.3 E/E' av.7 Lateral E/e': 7.5 Medial E/e': 9.9  ______________________________________________________________________________ Report approved by: Sofya Saucedo 2022 09:28 AM       US Abdomen Limited    Result Date: 2022  EXAM: US ABDOMEN LIMITED LOCATION: [facility] DATE/TIME: 2022 2:50 PM INDICATION:  Alcoholic cirrhosis of liver with ascites (H) COMPARISON: None. TECHNIQUE: Limited abdominal ultrasound. FINDINGS: No abdominal ascites. Paracentesis not performed.     IMPRESSION: 1.  No ascites.     30 minutes spent on the date of the encounter doing chart review, history and exam, documentation and further activities as noted above.    Signed by: Tez Riojas MD      Again, thank you for allowing me to participate in the care of your patient.        Sincerely,        Tez Riojas MD

## 2022-08-08 ENCOUNTER — TELEPHONE (OUTPATIENT)
Dept: GASTROENTEROLOGY | Facility: CLINIC | Age: 27
End: 2022-08-08

## 2022-08-11 ENCOUNTER — INFUSION THERAPY VISIT (OUTPATIENT)
Dept: INFUSION THERAPY | Facility: CLINIC | Age: 27
End: 2022-08-11
Attending: INTERNAL MEDICINE
Payer: COMMERCIAL

## 2022-08-11 VITALS
RESPIRATION RATE: 16 BRPM | OXYGEN SATURATION: 99 % | DIASTOLIC BLOOD PRESSURE: 77 MMHG | SYSTOLIC BLOOD PRESSURE: 140 MMHG | HEART RATE: 104 BPM | TEMPERATURE: 98 F

## 2022-08-11 DIAGNOSIS — D62 ACUTE POSTHEMORRHAGIC ANEMIA: Primary | ICD-10-CM

## 2022-08-11 LAB
ABO/RH(D): NORMAL
ANTIBODY SCREEN: NEGATIVE
BLD PROD TYP BPU: NORMAL
BLOOD COMPONENT TYPE: NORMAL
CODING SYSTEM: NORMAL
CROSSMATCH: NORMAL
ERYTHROCYTE [DISTWIDTH] IN BLOOD BY AUTOMATED COUNT: 23.2 % (ref 10–15)
HCT VFR BLD AUTO: 18 % (ref 40–53)
HGB BLD-MCNC: 6.2 G/DL (ref 13.3–17.7)
ISSUE DATE AND TIME: NORMAL
MCH RBC QN AUTO: 35.8 PG (ref 26.5–33)
MCHC RBC AUTO-ENTMCNC: 34.4 G/DL (ref 31.5–36.5)
MCV RBC AUTO: 104 FL (ref 78–100)
PLATELET # BLD AUTO: 98 10E3/UL (ref 150–450)
RBC # BLD AUTO: 1.73 10E6/UL (ref 4.4–5.9)
SPECIMEN EXPIRATION DATE: NORMAL
UNIT ABO/RH: NORMAL
UNIT NUMBER: NORMAL
UNIT STATUS: NORMAL
UNIT TYPE ISBT: 600
WBC # BLD AUTO: 18.5 10E3/UL (ref 4–11)

## 2022-08-11 PROCEDURE — 36430 TRANSFUSION BLD/BLD COMPNT: CPT

## 2022-08-11 PROCEDURE — 86850 RBC ANTIBODY SCREEN: CPT | Performed by: INTERNAL MEDICINE

## 2022-08-11 PROCEDURE — 85027 COMPLETE CBC AUTOMATED: CPT

## 2022-08-11 PROCEDURE — P9016 RBC LEUKOCYTES REDUCED: HCPCS | Performed by: INTERNAL MEDICINE

## 2022-08-11 PROCEDURE — 86923 COMPATIBILITY TEST ELECTRIC: CPT | Performed by: INTERNAL MEDICINE

## 2022-08-11 PROCEDURE — 36415 COLL VENOUS BLD VENIPUNCTURE: CPT

## 2022-08-11 RX ORDER — HEPARIN SODIUM (PORCINE) LOCK FLUSH IV SOLN 100 UNIT/ML 100 UNIT/ML
5 SOLUTION INTRAVENOUS
Status: CANCELLED | OUTPATIENT
Start: 2022-08-11

## 2022-08-11 RX ORDER — HEPARIN SODIUM,PORCINE 10 UNIT/ML
5 VIAL (ML) INTRAVENOUS
Status: CANCELLED | OUTPATIENT
Start: 2022-08-11

## 2022-08-14 ENCOUNTER — APPOINTMENT (OUTPATIENT)
Dept: CT IMAGING | Facility: CLINIC | Age: 27
DRG: 809 | End: 2022-08-14
Attending: EMERGENCY MEDICINE
Payer: COMMERCIAL

## 2022-08-14 ENCOUNTER — APPOINTMENT (OUTPATIENT)
Dept: RADIOLOGY | Facility: CLINIC | Age: 27
DRG: 809 | End: 2022-08-14
Attending: EMERGENCY MEDICINE
Payer: COMMERCIAL

## 2022-08-14 ENCOUNTER — HOSPITAL ENCOUNTER (INPATIENT)
Facility: CLINIC | Age: 27
LOS: 2 days | Discharge: HOME | DRG: 809 | End: 2022-08-16
Attending: EMERGENCY MEDICINE | Admitting: HOSPITALIST
Payer: COMMERCIAL

## 2022-08-14 DIAGNOSIS — E66.9 DIABETES MELLITUS TYPE 2 IN OBESE: Chronic | ICD-10-CM

## 2022-08-14 DIAGNOSIS — K76.82 HEPATIC ENCEPHALOPATHY (H): Primary | ICD-10-CM

## 2022-08-14 DIAGNOSIS — E11.69 DIABETES MELLITUS TYPE 2 IN OBESE: Chronic | ICD-10-CM

## 2022-08-14 DIAGNOSIS — K72.10 END-STAGE LIVER DISEASE (H): ICD-10-CM

## 2022-08-14 DIAGNOSIS — K72.10 END STAGE LIVER DISEASE (H): ICD-10-CM

## 2022-08-14 LAB
ABO/RH(D): NORMAL
ACANTHOCYTES BLD QL SMEAR: ABNORMAL
ALBUMIN SERPL-MCNC: 3.2 G/DL (ref 3.5–5)
ALBUMIN UR-MCNC: NEGATIVE MG/DL
ALP SERPL-CCNC: 202 U/L (ref 45–120)
ALT SERPL W P-5'-P-CCNC: 51 U/L (ref 0–45)
AMMONIA PLAS-SCNC: 58 UMOL/L (ref 11–35)
AMPHETAMINES UR QL SCN: NORMAL
ANION GAP SERPL CALCULATED.3IONS-SCNC: 10 MMOL/L (ref 5–18)
ANTIBODY SCREEN: NEGATIVE
APPEARANCE UR: CLEAR
APTT PPP: 42 SECONDS (ref 22–38)
AST SERPL W P-5'-P-CCNC: 120 U/L (ref 0–40)
ATRIAL RATE - MUSE: 81 BPM
BACTERIA #/AREA URNS HPF: ABNORMAL /HPF
BARBITURATES UR QL: NORMAL
BASOPHILS # BLD MANUAL: 0.3 10E3/UL (ref 0–0.2)
BASOPHILS NFR BLD MANUAL: 2 %
BENZODIAZ UR QL: NORMAL
BILIRUB SERPL-MCNC: 21.4 MG/DL (ref 0–1)
BILIRUB UR QL STRIP: NEGATIVE
BLD PROD TYP BPU: NORMAL
BLOOD COMPONENT TYPE: NORMAL
BNP SERPL-MCNC: 181 PG/ML (ref 0–35)
BUN SERPL-MCNC: 66 MG/DL (ref 8–22)
CALCIUM SERPL-MCNC: 9.5 MG/DL (ref 8.5–10.5)
CANNABINOIDS UR QL SCN: NORMAL
CHLORIDE BLD-SCNC: 94 MMOL/L (ref 98–107)
CO2 SERPL-SCNC: 29 MMOL/L (ref 22–31)
COCAINE UR QL: NORMAL
CODING SYSTEM: NORMAL
COLOR UR AUTO: YELLOW
CREAT SERPL-MCNC: 0.98 MG/DL (ref 0.7–1.3)
CREAT UR-MCNC: 42 MG/DL
CROSSMATCH: NORMAL
D DIMER PPP FEU-MCNC: 1.74 UG/ML FEU (ref 0–0.5)
DIASTOLIC BLOOD PRESSURE - MUSE: 82 MMHG
EOSINOPHIL # BLD MANUAL: 0.2 10E3/UL (ref 0–0.7)
EOSINOPHIL NFR BLD MANUAL: 1 %
ERYTHROCYTE [DISTWIDTH] IN BLOOD BY AUTOMATED COUNT: 25.7 % (ref 10–15)
ETHANOL SERPL-MCNC: <10 MG/DL
FIBRINOGEN PPP-MCNC: 228 MG/DL (ref 170–490)
FRAGMENTS BLD QL SMEAR: ABNORMAL
GFR SERPL CREATININE-BSD FRML MDRD: >90 ML/MIN/1.73M2
GLUCOSE BLD-MCNC: 96 MG/DL (ref 70–125)
GLUCOSE BLDC GLUCOMTR-MCNC: 105 MG/DL (ref 70–99)
GLUCOSE BLDC GLUCOMTR-MCNC: 118 MG/DL (ref 70–99)
GLUCOSE BLDC GLUCOMTR-MCNC: 55 MG/DL (ref 70–99)
GLUCOSE BLDC GLUCOMTR-MCNC: 83 MG/DL (ref 70–99)
GLUCOSE BLDC GLUCOMTR-MCNC: 84 MG/DL (ref 70–99)
GLUCOSE UR STRIP-MCNC: NEGATIVE MG/DL
HCT VFR BLD AUTO: 20 % (ref 40–53)
HGB BLD-MCNC: 6.7 G/DL (ref 13.3–17.7)
HGB BLD-MCNC: 6.9 G/DL (ref 13.3–17.7)
HGB UR QL STRIP: ABNORMAL
INR PPP: 1.68 (ref 0.85–1.15)
INTERPRETATION ECG - MUSE: NORMAL
ISSUE DATE AND TIME: NORMAL
KETONES UR STRIP-MCNC: NEGATIVE MG/DL
LACTATE SERPL-SCNC: 1.5 MMOL/L (ref 0.7–2)
LEUKOCYTE ESTERASE UR QL STRIP: NEGATIVE
LIPASE SERPL-CCNC: 50 U/L (ref 0–52)
LYMPHOCYTES # BLD MANUAL: 1.6 10E3/UL (ref 0.8–5.3)
LYMPHOCYTES NFR BLD MANUAL: 10 %
MAGNESIUM SERPL-MCNC: 2.2 MG/DL (ref 1.8–2.6)
MCH RBC QN AUTO: 34.5 PG (ref 26.5–33)
MCHC RBC AUTO-ENTMCNC: 33.5 G/DL (ref 31.5–36.5)
MCV RBC AUTO: 103 FL (ref 78–100)
METHADONE UR QL SCN: NORMAL
MONOCYTES # BLD MANUAL: 1.1 10E3/UL (ref 0–1.3)
MONOCYTES NFR BLD MANUAL: 7 %
MUCOUS THREADS #/AREA URNS LPF: PRESENT /LPF
NEUTROPHILS # BLD MANUAL: 13.1 10E3/UL (ref 1.6–8.3)
NEUTROPHILS NFR BLD MANUAL: 80 %
NITRATE UR QL: NEGATIVE
OPIATES UR QL SCN: NORMAL
OXYCODONE UR QL: NORMAL
P AXIS - MUSE: 67 DEGREES
PCP UR QL SCN: NORMAL
PH UR STRIP: 6.5 [PH] (ref 5–7)
PHOSPHATE SERPL-MCNC: 5.3 MG/DL (ref 2.5–4.5)
PLAT MORPH BLD: ABNORMAL
PLATELET # BLD AUTO: 91 10E3/UL (ref 150–450)
POTASSIUM BLD-SCNC: 5.1 MMOL/L (ref 3.5–5)
PR INTERVAL - MUSE: 190 MS
PROT SERPL-MCNC: 7.8 G/DL (ref 6–8)
QRS DURATION - MUSE: 94 MS
QT - MUSE: 410 MS
QTC - MUSE: 476 MS
R AXIS - MUSE: 64 DEGREES
RBC # BLD AUTO: 1.94 10E6/UL (ref 4.4–5.9)
RBC MORPH BLD: ABNORMAL
RBC URINE: <1 /HPF
SARS-COV-2 RNA RESP QL NAA+PROBE: NEGATIVE
SODIUM SERPL-SCNC: 133 MMOL/L (ref 136–145)
SP GR UR STRIP: 1.02 (ref 1–1.03)
SPECIMEN EXPIRATION DATE: NORMAL
SQUAMOUS EPITHELIAL: <1 /HPF
SYSTOLIC BLOOD PRESSURE - MUSE: 136 MMHG
T AXIS - MUSE: 40 DEGREES
TROPONIN I SERPL-MCNC: 0.03 NG/ML (ref 0–0.29)
UNIT ABO/RH: NORMAL
UNIT NUMBER: NORMAL
UNIT STATUS: NORMAL
UNIT TYPE ISBT: 600
UROBILINOGEN UR STRIP-MCNC: 8 MG/DL
VENTRICULAR RATE- MUSE: 81 BPM
WBC # BLD AUTO: 16.4 10E3/UL (ref 4–11)
WBC URINE: 1 /HPF

## 2022-08-14 PROCEDURE — 96375 TX/PRO/DX INJ NEW DRUG ADDON: CPT

## 2022-08-14 PROCEDURE — 81001 URINALYSIS AUTO W/SCOPE: CPT | Performed by: EMERGENCY MEDICINE

## 2022-08-14 PROCEDURE — 85610 PROTHROMBIN TIME: CPT | Performed by: EMERGENCY MEDICINE

## 2022-08-14 PROCEDURE — 84145 PROCALCITONIN (PCT): CPT | Performed by: HOSPITALIST

## 2022-08-14 PROCEDURE — 85379 FIBRIN DEGRADATION QUANT: CPT | Performed by: HOSPITALIST

## 2022-08-14 PROCEDURE — 86850 RBC ANTIBODY SCREEN: CPT | Performed by: EMERGENCY MEDICINE

## 2022-08-14 PROCEDURE — 83735 ASSAY OF MAGNESIUM: CPT | Performed by: EMERGENCY MEDICINE

## 2022-08-14 PROCEDURE — 96368 THER/DIAG CONCURRENT INF: CPT

## 2022-08-14 PROCEDURE — C9803 HOPD COVID-19 SPEC COLLECT: HCPCS

## 2022-08-14 PROCEDURE — 258N000003 HC RX IP 258 OP 636: Performed by: HOSPITALIST

## 2022-08-14 PROCEDURE — 85027 COMPLETE CBC AUTOMATED: CPT | Performed by: EMERGENCY MEDICINE

## 2022-08-14 PROCEDURE — 250N000013 HC RX MED GY IP 250 OP 250 PS 637: Performed by: EMERGENCY MEDICINE

## 2022-08-14 PROCEDURE — 93005 ELECTROCARDIOGRAM TRACING: CPT | Performed by: EMERGENCY MEDICINE

## 2022-08-14 PROCEDURE — 83735 ASSAY OF MAGNESIUM: CPT | Performed by: HOSPITALIST

## 2022-08-14 PROCEDURE — U0005 INFEC AGEN DETEC AMPLI PROBE: HCPCS | Performed by: EMERGENCY MEDICINE

## 2022-08-14 PROCEDURE — 36415 COLL VENOUS BLD VENIPUNCTURE: CPT | Performed by: EMERGENCY MEDICINE

## 2022-08-14 PROCEDURE — 83690 ASSAY OF LIPASE: CPT | Performed by: EMERGENCY MEDICINE

## 2022-08-14 PROCEDURE — 96366 THER/PROPH/DIAG IV INF ADDON: CPT

## 2022-08-14 PROCEDURE — 84484 ASSAY OF TROPONIN QUANT: CPT | Performed by: EMERGENCY MEDICINE

## 2022-08-14 PROCEDURE — 86923 COMPATIBILITY TEST ELECTRIC: CPT | Performed by: INTERNAL MEDICINE

## 2022-08-14 PROCEDURE — 83605 ASSAY OF LACTIC ACID: CPT | Performed by: EMERGENCY MEDICINE

## 2022-08-14 PROCEDURE — 250N000011 HC RX IP 250 OP 636: Performed by: HOSPITALIST

## 2022-08-14 PROCEDURE — 80051 ELECTROLYTE PANEL: CPT | Performed by: EMERGENCY MEDICINE

## 2022-08-14 PROCEDURE — 99223 1ST HOSP IP/OBS HIGH 75: CPT | Performed by: HOSPITALIST

## 2022-08-14 PROCEDURE — 36430 TRANSFUSION BLD/BLD COMPNT: CPT

## 2022-08-14 PROCEDURE — 99285 EMERGENCY DEPT VISIT HI MDM: CPT | Mod: 25

## 2022-08-14 PROCEDURE — 82140 ASSAY OF AMMONIA: CPT | Performed by: EMERGENCY MEDICINE

## 2022-08-14 PROCEDURE — 87641 MR-STAPH DNA AMP PROBE: CPT | Performed by: HOSPITALIST

## 2022-08-14 PROCEDURE — C9113 INJ PANTOPRAZOLE SODIUM, VIA: HCPCS | Performed by: HOSPITALIST

## 2022-08-14 PROCEDURE — 87040 BLOOD CULTURE FOR BACTERIA: CPT | Performed by: EMERGENCY MEDICINE

## 2022-08-14 PROCEDURE — 210N000002 HC R&B HEART CARE

## 2022-08-14 PROCEDURE — 85018 HEMOGLOBIN: CPT | Performed by: HOSPITALIST

## 2022-08-14 PROCEDURE — 96365 THER/PROPH/DIAG IV INF INIT: CPT

## 2022-08-14 PROCEDURE — 84100 ASSAY OF PHOSPHORUS: CPT | Performed by: HOSPITALIST

## 2022-08-14 PROCEDURE — 250N000013 HC RX MED GY IP 250 OP 250 PS 637

## 2022-08-14 PROCEDURE — 85007 BL SMEAR W/DIFF WBC COUNT: CPT | Performed by: EMERGENCY MEDICINE

## 2022-08-14 PROCEDURE — 71045 X-RAY EXAM CHEST 1 VIEW: CPT

## 2022-08-14 PROCEDURE — 250N000011 HC RX IP 250 OP 636: Performed by: EMERGENCY MEDICINE

## 2022-08-14 PROCEDURE — 84155 ASSAY OF PROTEIN SERUM: CPT | Performed by: EMERGENCY MEDICINE

## 2022-08-14 PROCEDURE — P9016 RBC LEUKOCYTES REDUCED: HCPCS | Performed by: EMERGENCY MEDICINE

## 2022-08-14 PROCEDURE — 82077 ASSAY SPEC XCP UR&BREATH IA: CPT | Performed by: EMERGENCY MEDICINE

## 2022-08-14 PROCEDURE — 85384 FIBRINOGEN ACTIVITY: CPT | Performed by: HOSPITALIST

## 2022-08-14 PROCEDURE — 70450 CT HEAD/BRAIN W/O DYE: CPT

## 2022-08-14 PROCEDURE — 258N000003 HC RX IP 258 OP 636: Performed by: EMERGENCY MEDICINE

## 2022-08-14 PROCEDURE — 36415 COLL VENOUS BLD VENIPUNCTURE: CPT | Performed by: HOSPITALIST

## 2022-08-14 PROCEDURE — 85730 THROMBOPLASTIN TIME PARTIAL: CPT | Performed by: EMERGENCY MEDICINE

## 2022-08-14 PROCEDURE — 83880 ASSAY OF NATRIURETIC PEPTIDE: CPT | Performed by: EMERGENCY MEDICINE

## 2022-08-14 PROCEDURE — 80307 DRUG TEST PRSMV CHEM ANLYZR: CPT | Performed by: EMERGENCY MEDICINE

## 2022-08-14 PROCEDURE — 86923 COMPATIBILITY TEST ELECTRIC: CPT | Performed by: EMERGENCY MEDICINE

## 2022-08-14 RX ORDER — FUROSEMIDE 10 MG/ML
40 INJECTION INTRAMUSCULAR; INTRAVENOUS ONCE
Status: COMPLETED | OUTPATIENT
Start: 2022-08-14 | End: 2022-08-14

## 2022-08-14 RX ORDER — OXYCODONE HYDROCHLORIDE 5 MG/1
5 TABLET ORAL EVERY 4 HOURS PRN
COMMUNITY
End: 2023-01-21

## 2022-08-14 RX ORDER — SODIUM CHLORIDE 9 MG/ML
INJECTION, SOLUTION INTRAVENOUS CONTINUOUS
Status: DISCONTINUED | OUTPATIENT
Start: 2022-08-14 | End: 2022-08-15

## 2022-08-14 RX ORDER — DEXTROSE MONOHYDRATE 25 G/50ML
25-50 INJECTION, SOLUTION INTRAVENOUS
Status: DISCONTINUED | OUTPATIENT
Start: 2022-08-14 | End: 2022-08-16 | Stop reason: HOSPADM

## 2022-08-14 RX ORDER — NICOTINE POLACRILEX 4 MG
LOZENGE BUCCAL
Status: COMPLETED
Start: 2022-08-14 | End: 2022-08-14

## 2022-08-14 RX ORDER — NICOTINE POLACRILEX 4 MG
15-30 LOZENGE BUCCAL
Status: DISCONTINUED | OUTPATIENT
Start: 2022-08-14 | End: 2022-08-16 | Stop reason: HOSPADM

## 2022-08-14 RX ORDER — CEFTRIAXONE 1 G/1
1 INJECTION, POWDER, FOR SOLUTION INTRAMUSCULAR; INTRAVENOUS ONCE
Status: COMPLETED | OUTPATIENT
Start: 2022-08-14 | End: 2022-08-14

## 2022-08-14 RX ORDER — LACTULOSE 10 G/15ML
30 SOLUTION ORAL ONCE
Status: COMPLETED | OUTPATIENT
Start: 2022-08-14 | End: 2022-08-14

## 2022-08-14 RX ORDER — HYDROXYZINE HYDROCHLORIDE 50 MG/1
50 TABLET, FILM COATED ORAL 3 TIMES DAILY PRN
COMMUNITY
End: 2022-08-14

## 2022-08-14 RX ORDER — METHYLPREDNISOLONE SODIUM SUCCINATE 40 MG/ML
40 INJECTION, POWDER, LYOPHILIZED, FOR SOLUTION INTRAMUSCULAR; INTRAVENOUS ONCE
Status: COMPLETED | OUTPATIENT
Start: 2022-08-14 | End: 2022-08-14

## 2022-08-14 RX ORDER — LIDOCAINE 40 MG/G
CREAM TOPICAL
Status: DISCONTINUED | OUTPATIENT
Start: 2022-08-14 | End: 2022-08-16 | Stop reason: HOSPADM

## 2022-08-14 RX ORDER — CEFTRIAXONE 1 G/1
1 INJECTION, POWDER, FOR SOLUTION INTRAMUSCULAR; INTRAVENOUS EVERY 24 HOURS
Status: DISCONTINUED | OUTPATIENT
Start: 2022-08-15 | End: 2022-08-16 | Stop reason: HOSPADM

## 2022-08-14 RX ORDER — HYDROXYZINE HYDROCHLORIDE 25 MG/1
25 TABLET, FILM COATED ORAL 3 TIMES DAILY PRN
Status: ON HOLD | COMMUNITY
End: 2022-08-16

## 2022-08-14 RX ORDER — LACTULOSE 10 G/15ML
20 SOLUTION ORAL 4 TIMES DAILY
Status: DISCONTINUED | OUTPATIENT
Start: 2022-08-15 | End: 2022-08-15

## 2022-08-14 RX ADMIN — DEXTROSE AND SODIUM CHLORIDE: 5; 900 INJECTION, SOLUTION INTRAVENOUS at 20:07

## 2022-08-14 RX ADMIN — PANTOPRAZOLE SODIUM 40 MG: 40 INJECTION, POWDER, FOR SOLUTION INTRAVENOUS at 22:38

## 2022-08-14 RX ADMIN — METHYLPREDNISOLONE SODIUM SUCCINATE 40 MG: 40 INJECTION, POWDER, FOR SOLUTION INTRAMUSCULAR; INTRAVENOUS at 22:30

## 2022-08-14 RX ADMIN — FUROSEMIDE 40 MG: 10 INJECTION, SOLUTION INTRAVENOUS at 20:01

## 2022-08-14 RX ADMIN — DEXTROSE AND SODIUM CHLORIDE: 5; 900 INJECTION, SOLUTION INTRAVENOUS at 18:20

## 2022-08-14 RX ADMIN — Medication 1000 MG: at 22:31

## 2022-08-14 RX ADMIN — DEXTROSE: 15 GEL ORAL at 20:45

## 2022-08-14 RX ADMIN — CEFTRIAXONE 1 G: 1 INJECTION, POWDER, FOR SOLUTION INTRAMUSCULAR; INTRAVENOUS at 19:24

## 2022-08-14 RX ADMIN — LACTULOSE 30 G: 10 SOLUTION ORAL at 20:52

## 2022-08-14 ASSESSMENT — ACTIVITIES OF DAILY LIVING (ADL)
DRESSING/BATHING_DIFFICULTY: NO
TRANSFERRING: 0-->ASSISTANCE NEEDED (DEVELOPMENTALLY APPROPRIATE)
TOILETING_ISSUES: YES
CHANGE_IN_FUNCTIONAL_STATUS_SINCE_ONSET_OF_CURRENT_ILLNESS/INJURY: YES
DIFFICULTY_COMMUNICATING: NO
TOILETING_ASSISTANCE: TOILETING DIFFICULTY, REQUIRES EQUIPMENT
TOILETING: 0-->NOT TOILET TRAINED OR ASSISTANCE NEEDED (DEVELOPMENTALLY APPROPRIATE)
DRESS: 1-->ASSISTANCE (EQUIPMENT/PERSON) NEEDED
HEARING_DIFFICULTY_OR_DEAF: NO
CONCENTRATING,_REMEMBERING_OR_MAKING_DECISIONS_DIFFICULTY: NO
TOILETING: 1-->ASSISTANCE (EQUIPMENT/PERSON) NEEDED
DIFFICULTY_COMMUNICATING: NO
DRESSING/BATHING_DIFFICULTY: YES
TOILETING_ISSUES: NO
DRESS: 0-->ASSISTANCE NEEDED (DEVELOPMENTALLY APPROPRIATE)
CONCENTRATING,_REMEMBERING_OR_MAKING_DECISIONS_DIFFICULTY: YES
NUMBER_OF_TIMES_PATIENT_HAS_FALLEN_WITHIN_LAST_SIX_MONTHS: 1
DEPENDENT_IADLS:: CLEANING;COOKING;SHOPPING;TRANSPORTATION
TRANSFERRING: 1-->ASSISTANCE (EQUIPMENT/PERSON) NEEDED
ADLS_ACUITY_SCORE: 35
WEAR_GLASSES_OR_BLIND: YES
TOILETING: 0-->NOT TOILET TRAINED OR ASSISTANCE NEEDED (DEVELOPMENTALLY APPROPRIATE)
DRESS: 1-->ASSISTANCE (EQUIPMENT/PERSON) NEEDED
ADLS_ACUITY_SCORE: 22
BATHING: 1-->ASSISTANCE NEEDED
CHANGE_IN_FUNCTIONAL_STATUS_SINCE_ONSET_OF_CURRENT_ILLNESS/INJURY: YES
DRESS: 0-->ASSISTANCE NEEDED (DEVELOPMENTALLY APPROPRIATE)
ADLS_ACUITY_SCORE: 36
BATHING: 1-->ASSISTANCE NEEDED
FALL_HISTORY_WITHIN_LAST_SIX_MONTHS: YES
DOING_ERRANDS_INDEPENDENTLY_DIFFICULTY: YES
TRANSFERRING: 0-->ASSISTANCE NEEDED (DEVELOPMENTALLY APPROPRIATE)
HEARING_DIFFICULTY_OR_DEAF: NO
TRANSFERRING: 1-->ASSISTANCE (EQUIPMENT/PERSON) NEEDED
WEAR_GLASSES_OR_BLIND: YES
TOILETING: 1-->ASSISTANCE (EQUIPMENT/PERSON) NEEDED
ADLS_ACUITY_SCORE: 22
WALKING_OR_CLIMBING_STAIRS_DIFFICULTY: YES
WALKING_OR_CLIMBING_STAIRS_DIFFICULTY: NO
DIFFICULTY_EATING/SWALLOWING: NO

## 2022-08-14 NOTE — ED PROVIDER NOTES
EMERGENCY DEPARTMENT ENCOUNTER            IMPRESSION:  Encephalopathy  History of end-stage liver disease  Chronic anemia  Hypoglycemia  Hypothermia      MEDICAL DECISION MAKING:  Patient brought in by ambulance from home for altered mental status.  He has a history of end-stage liver disease.  Paramedics found his blood sugar to be low and he was given dextrose.  His mother reports he missed his dose of lactulose last evening.  No evidence of trauma.  No recent infectious symptoms or other complaints per the mother    On exam patient is slightly hypothermic.  Warming blanket ordered.He is awake and responsive but not communicative.  He is not following commands.  Heart rate is slightly tachycardic.  He is hypothermic.  He is jaundiced.  He moves all extremities.  Abdomen is distended.    2 IVs were placed and he was started on D5.  Repeat blood sugar was within normal range    EKG showed sinus rhythm without evidence of ischemia or arrhythmia    Drug screen is negative    Urinalysis without evidence of infection    White count is elevated at 16,000.  This appears to be his baseline.  He is on steroids.    Hemoglobin is low at 6.7.  He has chronic anemia of illness.  1 unit RBC transfusion was ordered    Ammonia is elevated at 58.  I ordered lactulose enema    Liver function tests are slightly elevated.  Bilirubin is 21    INR 1.68    CT of the head unremarkable    I spoke with on-call GI.  GI agreed with starting lactulose.  Also agreed with starting antibiotics in the event his altered mental status could be secondary to SBP.    I suspect patient is encephalopathic from the elevated ammonia.  He missed his lactulose last evening.    He will be admitted to hospitalist        =================================================================  CHIEF COMPLAINT:  Chief Complaint   Patient presents with     Hypoglycemia         HPI  Dillon Salter is a 26 year old male with a history of alcohol use disorder, alcoholic  liver cirrhosis, esophageal varices status post banding, recurrent anemia requiring transfusion, Asperger's disorder, IDDM 2, hyperkalemia, and hyponatremia, who presents to the ED by EMS for evaluation of hypoglycemia.     Per chart review, the patient presented and was admitted to the [facility] ED on 07/28/2022 for hyperkalemia and severe hyperglycemia. No evidence of DKA. Hyperkalemia corrected for protocol, hyperglycemia treated as well with adjustment in insulin.  Leukocytosis with elevated procalcitonin, new murmur, recent history of MSSA bacteremia also empirically treated with vancomycin, blood cultures negative, vancomycin discontinued.  Lantus changed to 50 units daily, aspart 1 unit per 10 g of carb plus sliding scale insulin. NPH was started in the hospital, discontinue to simplify the treatment regimen. Patient is on a short course of prednisone, at follow-up with the oncology they will decide whether to continue prednisone 10 mg daily. He will continue his twice weekly hemoglobin checks. Spironolactone decreased to 50 mg daily given hyperkalemia    At present, the patient arrived via EMS when his mother found him unresponsive. His mother states that he was more lethargic today. His blood glucose was noted to be 35. EMS gave 25g of D10 enroute, bringing his blood glucose to 191.     I, Marilin Drake am serving as a scribe to document services personally performed by Dr. Vern Day MD, based on my observation and the provider's statements to me. I, Dr. Vern Day MD attest that Marilin Drake is acting in a scribe capacity, has observed my performance of the services and has documented them in accordance with my direction.      REVIEW OF SYSTEMS   Limited      PAST MEDICAL HISTORY:  Past Medical History:   Diagnosis Date     Diabetes (H)        PAST SURGICAL HISTORY:  Past Surgical History:   Procedure Laterality Date     ESOPHAGOSCOPY, GASTROSCOPY, DUODENOSCOPY (EGD), COMBINED N/A 5/24/2022    Procedure:  ESOPHAGOGASTRODUODENOSCOPY (EGD) with esophageal banding;  Surgeon: Gary Hurst MD;  Location: [facility] Main OR     ESOPHAGOSCOPY, GASTROSCOPY, DUODENOSCOPY (EGD), COMBINED N/A 6/20/2022    Procedure: ESOPHAGOGASTRODUODENOSCOPY;  Surgeon: Gavino Reza MD;  Location: [facility] Main OR     MIDLINE DOUBLE LUMEN PLACEMENT  5/26/2022          PICC TRIPLE LUMEN PLACEMENT  7/28/2022              CURRENT MEDICATIONS:    fluconazole (DIFLUCAN) 150 MG tablet  FLUoxetine (PROZAC) 40 MG capsule  folic acid (FOLVITE) 1 MG tablet  furosemide (LASIX) 40 MG tablet  hydrOXYzine (ATARAX) 25 MG tablet  insulin aspart (NOVOLOG FLEXPEN) 100 UNIT/ML pen  insulin glargine (LANTUS SOLOSTAR) 100 UNIT/ML pen  lactulose (CHRONULAC) 10 GM/15ML solution  multivitamin, therapeutic (THERA-VIT) TABS tablet  oxyCODONE (ROXICODONE) 5 MG tablet  predniSONE (DELTASONE) 10 MG tablet  spironolactone (ALDACTONE) 100 MG tablet  thiamine (B-1) 100 MG tablet  alcohol swab prep pads  blood glucose (NO BRAND SPECIFIED) test strip  blood glucose monitoring (NO BRAND SPECIFIED) meter device kit  gabapentin (NEURONTIN) 100 MG capsule  insulin pen needle (ULTICARE MICRO) 32G X 4 MM miscellaneous  pantoprazole (PROTONIX) 40 MG EC tablet  rifaximin (XIFAXAN) 550 MG TABS tablet  thin (NO BRAND SPECIFIED) lancets        ALLERGIES:  No Known Allergies    FAMILY HISTORY:  Family History   Problem Relation Age of Onset     Substance Abuse Mother      Substance Abuse Father      Substance Abuse Maternal Grandfather        SOCIAL HISTORY:   Social History     Socioeconomic History     Marital status: Single   Tobacco Use     Smoking status: Former Smoker     Smokeless tobacco: Never Used   Vaping Use     Vaping Use: Former   Substance and Sexual Activity     Alcohol use: Not Currently     Drug use: Not Currently     Types: Marijuana   Social History Narrative    26-year-old history of autism/Asperger's on the spectrum graduated high school at  [employer] worked at  and then another  place until the Covid epidemic in 2020 lives with parents (Leticia and Wes) socially isolated drinks alcohol beer daily        End-stage cirrhosis noted on admission to  April 2022       PHYSICAL EXAM:    /70   Pulse 80   Temp (!) 96.1  F (35.6  C) (Temporal)   Resp 14   Wt 72.6 kg (160 lb)   SpO2 98%   BMI 26.63 kg/m      Constitutional: His eyes are open he is awake responsive to verbal stimuli somewhat cooperative  Head: Normocephalic, atraumatic.  ENT: Icterus  Eyes: Jaundice  Neck: No lymphadenopathy, no stridor, supple, no soft tissue swelling  Chest: No tenderness   Respiratory: Respirations even, unlabored. Lungs clear to ascultation bilaterally, in no acute respiratory distress.  Cardiovascular: Regular rate and rhythm.+2 radial pulses, equal bilaterally.  No murmurs.   GI: Distended.   Back: No CVA tenderness.    Musculoskeletal: Moves all 4 extremities equally, strength symmetrical on bilateral uppers and lowers.  No peripheral edema  Integument: Jaundice  Neurologic: Awake and responsive noxious stimuli, does not follow command, was all extremities      ED COURSE:  4:58 PM I met with the patient, obtained history, performed an initial exam, and discussed options and plan for diagnostics and treatment here in the ED.  6:13 PM Spoke with Dr. Chelsey CONN.   6:55 PM Obtained blood consent from patient.   7:12 PM Spoke with Hospitalist, Dr. Negron, who will meet the patient.   7:24 PM Hospitalist, Dr. Negron preformed initial exam.       LAB:  All pertinent labs reviewed and interpreted.  Results for orders placed or performed during the hospital encounter of 08/14/22   CT Head w/o Contrast    Impression    IMPRESSION:  1.  No acute intracranial abnormality.   XR Chest 1 View    Impression    IMPRESSION: Low lung volumes with crowding the pulmonary vasculature. Patchy perihilar opacity and central peribronchial cuffing please correlate clinically for  CHF. No pneumothorax. No pleural effusion. Mild cardiomegaly.   Result Value Ref Range    INR 1.68 (H) 0.85 - 1.15   Partial thromboplastin time   Result Value Ref Range    aPTT 42 (H) 22 - 38 Seconds   Comprehensive metabolic panel   Result Value Ref Range    Sodium 133 (L) 136 - 145 mmol/L    Potassium 5.1 (H) 3.5 - 5.0 mmol/L    Chloride 94 (L) 98 - 107 mmol/L    Carbon Dioxide (CO2) 29 22 - 31 mmol/L    Anion Gap 10 5 - 18 mmol/L    Urea Nitrogen 66 (H) 8 - 22 mg/dL    Creatinine 0.98 0.70 - 1.30 mg/dL    Calcium 9.5 8.5 - 10.5 mg/dL    Glucose 96 70 - 125 mg/dL    Alkaline Phosphatase 202 (H) 45 - 120 U/L     (H) 0 - 40 U/L    ALT 51 (H) 0 - 45 U/L    Protein Total 7.8 6.0 - 8.0 g/dL    Albumin 3.2 (L) 3.5 - 5.0 g/dL    Bilirubin Total 21.4 (HH) 0.0 - 1.0 mg/dL    GFR Estimate >90 >60 mL/min/1.73m2   Result Value Ref Range    Lipase 50 0 - 52 U/L   Lactic acid whole blood   Result Value Ref Range    Lactic Acid 1.5 0.7 - 2.0 mmol/L   Result Value Ref Range    Troponin I 0.03 0.00 - 0.29 ng/mL   Result Value Ref Range    Ammonia 58 (H) 11 - 35 umol/L   Result Value Ref Range    Magnesium 2.2 1.8 - 2.6 mg/dL   B-Type Natriuretic Peptide ([facility] Only)   Result Value Ref Range     (H) 0 - 35 pg/mL   Ethyl Alcohol Level   Result Value Ref Range    Alcohol, Blood <10 None detected mg/dL   UA with Microscopic reflex to Culture    Specimen: Urine, Clean Catch   Result Value Ref Range    Color Urine Yellow Colorless, Straw, Light Yellow, Yellow    Appearance Urine Clear Clear    Glucose Urine Negative Negative mg/dL    Bilirubin Urine Negative Negative    Ketones Urine Negative Negative mg/dL    Specific Gravity Urine 1.016 1.001 - 1.030    Blood Urine 0.03 mg/dL (A) Negative    pH Urine 6.5 5.0 - 7.0    Protein Albumin Urine Negative Negative mg/dL    Urobilinogen Urine 8.0 (A) <2.0 mg/dL    Nitrite Urine Negative Negative    Leukocyte Esterase Urine Negative Negative    Bacteria Urine Few (A) None Seen  /HPF    Mucus Urine Present (A) None Seen /LPF    RBC Urine <1 <=2 /HPF    WBC Urine 1 <=5 /HPF    Squamous Epithelials Urine <1 <=1 /HPF   Asymptomatic COVID-19 Virus (Coronavirus) by PCR Nasopharyngeal    Specimen: Nasopharyngeal; Swab   Result Value Ref Range    SARS CoV2 PCR Negative Negative   Glucose by meter   Result Value Ref Range    GLUCOSE BY METER POCT 83 70 - 99 mg/dL   CBC with platelets and differential   Result Value Ref Range    WBC Count 16.4 (H) 4.0 - 11.0 10e3/uL    RBC Count 1.94 (L) 4.40 - 5.90 10e6/uL    Hemoglobin 6.7 (LL) 13.3 - 17.7 g/dL    Hematocrit 20.0 (L) 40.0 - 53.0 %     (H) 78 - 100 fL    MCH 34.5 (H) 26.5 - 33.0 pg    MCHC 33.5 31.5 - 36.5 g/dL    RDW 25.7 (H) 10.0 - 15.0 %    Platelet Count 91 (L) 150 - 450 10e3/uL   Manual Differential   Result Value Ref Range    % Neutrophils 80 %    % Lymphocytes 10 %    % Monocytes 7 %    % Eosinophils 1 %    % Basophils 2 %    Absolute Neutrophils 13.1 (H) 1.6 - 8.3 10e3/uL    Absolute Lymphocytes 1.6 0.8 - 5.3 10e3/uL    Absolute Monocytes 1.1 0.0 - 1.3 10e3/uL    Absolute Eosinophils 0.2 0.0 - 0.7 10e3/uL    Absolute Basophils 0.3 (H) 0.0 - 0.2 10e3/uL    RBC Morphology Confirmed RBC Indices     Platelet Assessment  Automated Count Confirmed. Platelet morphology is normal.     Automated Count Confirmed. Platelet morphology is normal.    Acanthocytes Moderate (A) None Seen    RBC Fragments Moderate (A) None Seen   Drugs of Abuse 1+ Panel, Urine ([facility])   Result Value Ref Range    Amphetamines Urine Screen Negative Screen Negative    Benzodiazepines Urine Screen Negative Screen Negative    Opiates Urine Screen Negative Screen Negative    PCP Urine Screen Negative Screen Negative    Cannabinoids Urine Screen Negative Screen Negative    Barbiturates Urine Screen Negative Screen Negative    Cocaine Urine Screen Negative Screen Negative    Methadone Urine Screen Negative Screen Negative    Oxycodone Urine Screen Negative Screen  Negative    Creatinine Urine mg/dL 42 mg/dL   ECG 12-LEAD WITH MUSE ([facility])   Result Value Ref Range    Systolic Blood Pressure 136 mmHg    Diastolic Blood Pressure 82 mmHg    Ventricular Rate 81 BPM    Atrial Rate 81 BPM    NH Interval 190 ms    QRS Duration 94 ms     ms    QTc 476 ms    P Axis 67 degrees    R AXIS 64 degrees    T Axis 40 degrees    Interpretation ECG       Sinus rhythm  Normal ECG  When compared with ECG of 14-AUG-2022 16:56,  No significant change was found  Confirmed by SEE ED PROVIDER NOTE FOR, ECG INTERPRETATION ([id number]),  TSACY BOLIVAR ([id number]) on 8/14/2022 5:20:20 PM     Adult Type and Screen   Result Value Ref Range    ABO/RH(D) A NEG     Antibody Screen Negative Negative    SPECIMEN EXPIRATION DATE [id number]    Prepare red blood cells (unit)   Result Value Ref Range    CROSSMATCH Compatible     UNIT ABO/RH A Neg     Unit Number [id number]     Unit Status Issued     Blood Component Type Red Blood Cells     Product Code [id number]     CODING SYSTEM [id number]     UNIT TYPE ISBT 0600     ISSUE DATE AND TIME [id number]        RADIOLOGY:  Reviewed all pertinent imaging. Please see official radiology report.  CT Head w/o Contrast   Final Result   IMPRESSION:   1.  No acute intracranial abnormality.      XR Chest 1 View   Final Result   IMPRESSION: Low lung volumes with crowding the pulmonary vasculature. Patchy perihilar opacity and central peribronchial cuffing please correlate clinically for CHF. No pneumothorax. No pleural effusion. Mild cardiomegaly.           EKG:    Vent. rate: 81 BPM  NH interval: 190 ms   QRS duration: 94 ms   QT/QTc: 410/476 ms   P-R-T axes: 67   64    40   BP: 136/82 mmHg  Impression: Sinus rhythm, normal ECG  When compared with ECG of 14-AUG-2022, no significant change was found.     I have independently reviewed and interpreted the EKG(s) documented above.    PROCEDURES:   Critical Care Time 120 minutes excluding procedures      MEDICATIONS GIVEN IN THE  EMERGENCY:  Medications   0.9% sodium chloride BOLUS (has no administration in time range)     Followed by   sodium chloride 0.9% infusion (has no administration in time range)   lactulose (CHRONULAC) rectal enema 200 g (has no administration in time range)   cefTRIAXone (ROCEPHIN) 1 g vial to attach to  mL bag for ADULTS or NS 50 mL bag for PEDS (1 g Intravenous New Bag 8/14/22 1924)   dextrose 5% and 0.9% NaCl infusion ( Intravenous New Bag 8/14/22 1820)           NEW PRESCRIPTIONS STARTED AT TODAY'S ER VISIT:  New Prescriptions    No medications on file          FINAL DIAGNOSIS:  No diagnosis found.         At the conclusion of the encounter I discussed the results of all of the tests and the disposition. The questions were answered. The patient or family acknowledged understanding and was agreeable with the care plan.     NAME: Dillon Salter  AGE: 26 year old male  YOB: 1995  MRN: [id number]  EVALUATION DATE & TIME: 8/14/2022  4:55 PM    PCP: Gary Rodrigues    ED PROVIDER: LULA Werner Elizabeth Vu, am serving as a scribe to document services personally performed by Dr. Vern Day based on my observation and the provider's statements to me. I, Vern Day MD attest that Marilin Drake is acting in a scribe capacity, has observed my performance of the services and has documented them in accordance with my direction.    Vern Day M.D.  Emergency Medicine  [facility] EMERGENCY ROOM  [address] [facility] [address]  [phone]  Dept: [phone]  8/14/2022       Vern Day MD  08/14/22 1954

## 2022-08-14 NOTE — CONSULTS
.  Care Management Initial Consult    General Information  Assessment completed with: Parents, Patient's mother Leticia  Type of CM/SW Visit: Initial Assessment    Primary Care Provider verified and updated as needed: Yes   Readmission within the last 30 days: unable to assess      Reason for Consult: discharge planning  Advance Care Planning: Advance Care Planning Reviewed: other (see comments) (Declined Honoring Choices)     General Information Comments: Lives with parents    Communication Assessment  Patient's communication style: spoken language (English or Bilingual)    Hearing Difficulty or Deaf: no   Wear Glasses or Blind: yes    Cognitive  Cognitive/Neuro/Behavioral: level of consciousness  Level of Consciousness: lethargic                   Living Environment:   People in home: parent(s)     Current living Arrangements: house      Able to return to prior arrangements: yes  Living Arrangement Comments: Lives with parents    Family/Social Support:  Care provided by: self  Provides care for: no one  Marital Status: Single  Parent(s)          Description of Support System: Supportive, Involved    Support Assessment: Adequate family and caregiver support    Current Resources:   Patient receiving home care services: No     Community Resources: Financial/Insurance  Equipment currently used at home: glucometer  Supplies currently used at home: Diabetic Supplies    Employment/Financial:  Employment Status: unemployed        Financial Concerns: No concerns identified           Lifestyle & Psychosocial Needs:  Social Determinants of Health     Tobacco Use: Medium Risk     Smoking Tobacco Use: Former Smoker     Smokeless Tobacco Use: Never Used   Alcohol Use: Not on file   Financial Resource Strain: Not on file   Food Insecurity: Not on file   Transportation Needs: Not on file   Physical Activity: Not on file   Stress: Not on file   Social Connections: Not on file   Intimate Partner Violence: Not on file   Depression:  Not at risk     PHQ-2 Score: 1   Housing Stability: Not on file       Functional Status:  Prior to admission patient needed assistance:   Dependent ADLs:: Independent  Dependent IADLs:: Cleaning, Cooking, Shopping, Transportation  Assesssment of Functional Status: Not at  functional baseline    Mental Health Status:          Chemical Dependency Status:  Chemical Dependency Status: Current Concern             Values/Beliefs:  Spiritual, Cultural Beliefs, Muslim Practices, Values that affect care:                 Additional Information:  Information gathered from patient s mother Leticia, at bedside due to patient s clinical status. Patient arrived via EMS to  ED when his mother found him unresponsive. His mother states that he was more lethargic today. Per Leticia patient is alert and independent at baseline. Per Leticia there has been no change in patient s status related to getting counselor since last admission (7/28/22--8/02/22). Plan is for patient to discharge to home with family. Informed patient s mother that MOLLY/CM will follow patient on the unit.  Other needs pending clinical progress.    RUKHSANA ALANIZ, RN/CM

## 2022-08-14 NOTE — ED TRIAGE NOTES
Pt arrives via EMS after mother found him to unresponsive with a low blood glucose of 35. EMS gave 25g D10 enroute. BS after that was 191. Blood sugar on arrival was 83. Pt long hx of liver issues and anemia with weekly transfusion.      Triage Assessment     Row Name 08/14/22 [id number]       Triage Assessment (Adult)    Airway WDL WDL       Respiratory WDL    Respiratory WDL WDL       Skin Circulation/Temperature WDL    Skin Circulation/Temperature WDL WDL       Cardiac WDL    Cardiac WDL WDL       Peripheral/Neurovascular WDL    Peripheral Neurovascular WDL WDL       Cognitive/Neuro/Behavioral WDL    Cognitive/Neuro/Behavioral WDL level of consciousness    Level of Consciousness lethargic

## 2022-08-14 NOTE — PHARMACY-ADMISSION MEDICATION HISTORY
Pharmacy Note - Admission Medication History    Pertinent Provider Information:  During recent visit with primary care provider Dr Rodrigues on 8/9/22, fluoxetine dose was increased to 60mg daily and as needed hydroxyzine and oxycodone were prescribed.  Per patient's mother, his hematologist discontinued his magnesium and decreased his prednisone dose from 10mg daily to 5mg daily and told patient to stop taking prednisone on 8/20/22.  He has not started taking rifampin yet due to needing insurance prior approval.   ______________________________________________________________________    Prior To Admission (PTA) med list completed and updated in EMR.       PTA Med List   Medication Sig Last Dose     fluconazole (DIFLUCAN) 150 MG tablet Take 150 mg by mouth daily as needed Patient to use for 7 days if yeast infection flare up.      FLUoxetine (PROZAC) 40 MG capsule Take 60 mg by mouth At Bedtime 8/13/2022     folic acid (FOLVITE) 1 MG tablet Take 1 tablet (1 mg) by mouth daily 8/13/2022     furosemide (LASIX) 40 MG tablet Take 1 tablet (40 mg) by mouth 2 times daily 8/13/2022     hydrOXYzine (ATARAX) 25 MG tablet Take 25 mg by mouth 3 times daily as needed for itching      insulin aspart (NOVOLOG FLEXPEN) 100 UNIT/ML pen 1 unit per 10 grams of carb, plus additional units based on following scale   For Pre-Meal  - 164 give 1 unit. For Pre-Meal  - 189 give 2 units. For Pre-Meal  - 214 give 3 units. For Pre-Meal  - 239 give 4 units. For Pre-Meal  - 264 give 5 units. For Pre-Meal  - 289 give 6 units. For Pre-Meal  - 314 give 7 units. For Pre-Meal  - 339 give 8 units. For Pre-Meal  - 364 give 9 units.  For Pre-Meal BG greater than or equal to 365 give 10 units 8/13/2022 at pm     insulin glargine (LANTUS SOLOSTAR) 100 UNIT/ML pen Inject 50 Units Subcutaneous At Bedtime 8/13/2022 at pm     lactulose (CHRONULAC) 10 GM/15ML solution Take 30 mLs (20 g) by mouth 4 times daily  (Patient taking differently: Take 20 g by mouth 2 times daily) 8/13/2022     multivitamin, therapeutic (THERA-VIT) TABS tablet Take 1 tablet by mouth in the morning. 8/13/2022     oxyCODONE (ROXICODONE) 5 MG tablet Take 5 mg by mouth every 4 hours as needed for severe pain      predniSONE (DELTASONE) 10 MG tablet Take 1 tablet (10 mg) by mouth daily (Patient taking differently: Take 5 mg by mouth daily Take through 8/20) 8/13/2022     spironolactone (ALDACTONE) 100 MG tablet Take 0.5 tablets (50 mg) by mouth daily 8/13/2022     thiamine (B-1) 100 MG tablet Take 1 tablet (100 mg) by mouth in the morning. 8/13/2022       Information source(s): Family member, Clinic records, Hospital records and [facility]Everywhere/[facility]  Method of interview communication: in-person    Summary of Changes to PTA Med List  New: hydroxyzine, oxycodone  Discontinued: magnesium  Changed: fluoxetine dose increased, spironolactone dose increased, prednisone dose decreased    Patient was asked about OTC/herbal products specifically.  PTA med list reflects this.    In the past week, patient estimated taking medication this percent of the time:  greater than 90%.    Allergies were reviewed, assessed, and updated with the patient.      Patient does not use any multi-dose medications prior to admission.    The information provided in this note is only as accurate as the sources available at the time of the update(s).    Thank you for the opportunity to participate in the care of this patient.    Sarah Herrera [facility]  8/14/2022 5:54 PM

## 2022-08-15 LAB
ACANTHOCYTES BLD QL SMEAR: ABNORMAL
ALBUMIN SERPL-MCNC: 2.7 G/DL (ref 3.5–5)
ALBUMIN SERPL-MCNC: 2.7 G/DL (ref 3.5–5)
ALP SERPL-CCNC: 163 U/L (ref 45–120)
ALT SERPL W P-5'-P-CCNC: 42 U/L (ref 0–45)
AMMONIA PLAS-SCNC: 52 UMOL/L (ref 11–35)
ANION GAP SERPL CALCULATED.3IONS-SCNC: 11 MMOL/L (ref 5–18)
AST SERPL W P-5'-P-CCNC: 98 U/L (ref 0–40)
BASO STIPL BLD QL SMEAR: PRESENT
BASOPHILS # BLD MANUAL: 0 10E3/UL (ref 0–0.2)
BASOPHILS NFR BLD MANUAL: 0 %
BILIRUB SERPL-MCNC: 19.5 MG/DL (ref 0–1)
BLD PROD TYP BPU: NORMAL
BLOOD COMPONENT TYPE: NORMAL
BUN SERPL-MCNC: 55 MG/DL (ref 8–22)
BURR CELLS BLD QL SMEAR: SLIGHT
CALCIUM SERPL-MCNC: 8.8 MG/DL (ref 8.5–10.5)
CHLORIDE BLD-SCNC: 96 MMOL/L (ref 98–107)
CO2 SERPL-SCNC: 26 MMOL/L (ref 22–31)
CODING SYSTEM: NORMAL
CREAT SERPL-MCNC: 0.81 MG/DL (ref 0.7–1.3)
CROSSMATCH: NORMAL
EOSINOPHIL # BLD MANUAL: 0 10E3/UL (ref 0–0.7)
EOSINOPHIL NFR BLD MANUAL: 0 %
ERYTHROCYTE [DISTWIDTH] IN BLOOD BY AUTOMATED COUNT: 24.8 % (ref 10–15)
ERYTHROCYTE [DISTWIDTH] IN BLOOD BY AUTOMATED COUNT: 25.2 % (ref 10–15)
GFR SERPL CREATININE-BSD FRML MDRD: >90 ML/MIN/1.73M2
GLUCOSE BLD-MCNC: 258 MG/DL (ref 70–125)
GLUCOSE BLDC GLUCOMTR-MCNC: 228 MG/DL (ref 70–99)
GLUCOSE BLDC GLUCOMTR-MCNC: 239 MG/DL (ref 70–99)
GLUCOSE BLDC GLUCOMTR-MCNC: 267 MG/DL (ref 70–99)
GLUCOSE BLDC GLUCOMTR-MCNC: 331 MG/DL (ref 70–99)
GLUCOSE BLDC GLUCOMTR-MCNC: 383 MG/DL (ref 70–99)
GLUCOSE BLDC GLUCOMTR-MCNC: 404 MG/DL (ref 70–99)
GLUCOSE BLDC GLUCOMTR-MCNC: 456 MG/DL (ref 70–99)
HCT VFR BLD AUTO: 19 % (ref 40–53)
HCT VFR BLD AUTO: 22.4 % (ref 40–53)
HGB BLD-MCNC: 6.8 G/DL (ref 13.3–17.7)
HGB BLD-MCNC: 7.3 G/DL (ref 13.3–17.7)
HGB BLD-MCNC: 7.8 G/DL (ref 13.3–17.7)
ISSUE DATE AND TIME: NORMAL
LACTATE SERPL-SCNC: 1.8 MMOL/L (ref 0.7–2)
LYMPHOCYTES # BLD MANUAL: 0.1 10E3/UL (ref 0.8–5.3)
LYMPHOCYTES NFR BLD MANUAL: 1 %
MAGNESIUM SERPL-MCNC: 2.1 MG/DL (ref 1.8–2.6)
MCH RBC QN AUTO: 33.9 PG (ref 26.5–33)
MCH RBC QN AUTO: 35.2 PG (ref 26.5–33)
MCHC RBC AUTO-ENTMCNC: 34.8 G/DL (ref 31.5–36.5)
MCHC RBC AUTO-ENTMCNC: 35.8 G/DL (ref 31.5–36.5)
MCV RBC AUTO: 97 FL (ref 78–100)
MCV RBC AUTO: 98 FL (ref 78–100)
MONOCYTES # BLD MANUAL: 1.4 10E3/UL (ref 0–1.3)
MONOCYTES NFR BLD MANUAL: 10 %
MRSA DNA SPEC QL NAA+PROBE: NEGATIVE
NEUTROPHILS # BLD MANUAL: 12.6 10E3/UL (ref 1.6–8.3)
NEUTROPHILS NFR BLD MANUAL: 89 %
PLAT MORPH BLD: ABNORMAL
PLATELET # BLD AUTO: 82 10E3/UL (ref 150–450)
PLATELET # BLD AUTO: 86 10E3/UL (ref 150–450)
POLYCHROMASIA BLD QL SMEAR: SLIGHT
POTASSIUM BLD-SCNC: 4.9 MMOL/L (ref 3.5–5)
PROCALCITONIN SERPL-MCNC: 1.08 NG/ML (ref 0–0.49)
PROT SERPL-MCNC: 6.8 G/DL (ref 6–8)
RBC # BLD AUTO: 1.93 10E6/UL (ref 4.4–5.9)
RBC # BLD AUTO: 2.3 10E6/UL (ref 4.4–5.9)
RBC MORPH BLD: ABNORMAL
RETICS # AUTO: 0.33 10E6/UL (ref 0.01–0.11)
RETICS/RBC NFR AUTO: 14.3 % (ref 0.8–2.7)
SA TARGET DNA: POSITIVE
SODIUM SERPL-SCNC: 133 MMOL/L (ref 136–145)
TARGETS BLD QL SMEAR: SLIGHT
UNIT ABO/RH: NORMAL
UNIT NUMBER: NORMAL
UNIT STATUS: NORMAL
UNIT TYPE ISBT: 600
WBC # BLD AUTO: 12.6 10E3/UL (ref 4–11)
WBC # BLD AUTO: 14.2 10E3/UL (ref 4–11)

## 2022-08-15 PROCEDURE — 250N000012 HC RX MED GY IP 250 OP 636 PS 637: Performed by: HOSPITALIST

## 2022-08-15 PROCEDURE — 258N000003 HC RX IP 258 OP 636: Performed by: HOSPITALIST

## 2022-08-15 PROCEDURE — 258N000003 HC RX IP 258 OP 636: Performed by: EMERGENCY MEDICINE

## 2022-08-15 PROCEDURE — 85018 HEMOGLOBIN: CPT | Performed by: HOSPITALIST

## 2022-08-15 PROCEDURE — 99233 SBSQ HOSP IP/OBS HIGH 50: CPT | Performed by: INTERNAL MEDICINE

## 2022-08-15 PROCEDURE — 99233 SBSQ HOSP IP/OBS HIGH 50: CPT | Performed by: HOSPITALIST

## 2022-08-15 PROCEDURE — 36415 COLL VENOUS BLD VENIPUNCTURE: CPT | Performed by: HOSPITALIST

## 2022-08-15 PROCEDURE — 82140 ASSAY OF AMMONIA: CPT | Performed by: HOSPITALIST

## 2022-08-15 PROCEDURE — 85045 AUTOMATED RETICULOCYTE COUNT: CPT | Performed by: INTERNAL MEDICINE

## 2022-08-15 PROCEDURE — 80053 COMPREHEN METABOLIC PANEL: CPT | Performed by: HOSPITALIST

## 2022-08-15 PROCEDURE — 36415 COLL VENOUS BLD VENIPUNCTURE: CPT | Performed by: INTERNAL MEDICINE

## 2022-08-15 PROCEDURE — 250N000013 HC RX MED GY IP 250 OP 250 PS 637: Performed by: HOSPITALIST

## 2022-08-15 PROCEDURE — 85060 BLOOD SMEAR INTERPRETATION: CPT | Performed by: PATHOLOGY

## 2022-08-15 PROCEDURE — 85014 HEMATOCRIT: CPT | Performed by: INTERNAL MEDICINE

## 2022-08-15 PROCEDURE — 85027 COMPLETE CBC AUTOMATED: CPT | Performed by: HOSPITALIST

## 2022-08-15 PROCEDURE — C9113 INJ PANTOPRAZOLE SODIUM, VIA: HCPCS | Performed by: HOSPITALIST

## 2022-08-15 PROCEDURE — 210N000002 HC R&B HEART CARE

## 2022-08-15 PROCEDURE — 83605 ASSAY OF LACTIC ACID: CPT | Performed by: HOSPITALIST

## 2022-08-15 PROCEDURE — 85007 BL SMEAR W/DIFF WBC COUNT: CPT | Performed by: INTERNAL MEDICINE

## 2022-08-15 PROCEDURE — P9016 RBC LEUKOCYTES REDUCED: HCPCS | Performed by: INTERNAL MEDICINE

## 2022-08-15 PROCEDURE — 250N000011 HC RX IP 250 OP 636: Performed by: HOSPITALIST

## 2022-08-15 RX ORDER — LACTULOSE 10 G/15ML
20 SOLUTION ORAL 2 TIMES DAILY
Status: DISCONTINUED | OUTPATIENT
Start: 2022-08-15 | End: 2022-08-16 | Stop reason: HOSPADM

## 2022-08-15 RX ORDER — ONDANSETRON 2 MG/ML
4 INJECTION INTRAMUSCULAR; INTRAVENOUS EVERY 6 HOURS PRN
Status: DISCONTINUED | OUTPATIENT
Start: 2022-08-15 | End: 2022-08-16 | Stop reason: HOSPADM

## 2022-08-15 RX ORDER — ONDANSETRON 4 MG/1
4 TABLET, ORALLY DISINTEGRATING ORAL EVERY 6 HOURS PRN
Status: DISCONTINUED | OUTPATIENT
Start: 2022-08-15 | End: 2022-08-16 | Stop reason: HOSPADM

## 2022-08-15 RX ADMIN — INSULIN ASPART 6 UNITS: 100 INJECTION, SOLUTION INTRAVENOUS; SUBCUTANEOUS at 16:23

## 2022-08-15 RX ADMIN — CEFTRIAXONE 1 G: 1 INJECTION, POWDER, FOR SOLUTION INTRAMUSCULAR; INTRAVENOUS at 19:15

## 2022-08-15 RX ADMIN — ONDANSETRON 4 MG: 2 INJECTION INTRAMUSCULAR; INTRAVENOUS at 19:12

## 2022-08-15 RX ADMIN — INSULIN ASPART 4 UNITS: 100 INJECTION, SOLUTION INTRAVENOUS; SUBCUTANEOUS at 08:35

## 2022-08-15 RX ADMIN — INSULIN GLARGINE 20 UNITS: 100 INJECTION, SOLUTION SUBCUTANEOUS at 11:28

## 2022-08-15 RX ADMIN — INSULIN GLARGINE 20 UNITS: 100 INJECTION, SOLUTION SUBCUTANEOUS at 21:54

## 2022-08-15 RX ADMIN — RIFAXIMIN 550 MG: 550 TABLET ORAL at 22:07

## 2022-08-15 RX ADMIN — RIFAXIMIN 550 MG: 550 TABLET ORAL at 08:29

## 2022-08-15 RX ADMIN — INSULIN ASPART 3 UNITS: 100 INJECTION, SOLUTION INTRAVENOUS; SUBCUTANEOUS at 04:08

## 2022-08-15 RX ADMIN — LACTULOSE 20 G: 10 SOLUTION ORAL at 08:29

## 2022-08-15 RX ADMIN — INSULIN ASPART 5 UNITS: 100 INJECTION, SOLUTION INTRAVENOUS; SUBCUTANEOUS at 12:15

## 2022-08-15 RX ADMIN — PANTOPRAZOLE SODIUM 40 MG: 40 INJECTION, POWDER, FOR SOLUTION INTRAVENOUS at 08:29

## 2022-08-15 RX ADMIN — Medication 1000 MG: at 11:21

## 2022-08-15 RX ADMIN — SODIUM CHLORIDE: 9 INJECTION, SOLUTION INTRAVENOUS at 02:32

## 2022-08-15 RX ADMIN — Medication 1000 MG: at 22:07

## 2022-08-15 RX ADMIN — PANTOPRAZOLE SODIUM 40 MG: 40 INJECTION, POWDER, FOR SOLUTION INTRAVENOUS at 22:07

## 2022-08-15 ASSESSMENT — ACTIVITIES OF DAILY LIVING (ADL)
ADLS_ACUITY_SCORE: 29
ADLS_ACUITY_SCORE: 31
ADLS_ACUITY_SCORE: 29

## 2022-08-15 NOTE — H&P
Hospital Medicine Service History and Physical  [facility]: [facility]    Dillon Salter is a 26 year old male who  has a past medical history of Diabetes (H).   Chief Complaint: Hypoglycemia    Assessment and Plan  Severe macrocytic anemia  Thrombocytopenia  Has required regular transfusions recently and is established with heme-onc  Initial hemoglobin 6.7 in the ED, slightly down from past few week  B12, TSH, folate all recently assessed and reassuring  RBC fragments on CBC  TTP, DIC on differential although lower suspicion  No purpura on exam, no history of heart valve although does have a murmur  Will check fibrinogen, D-Dimer  BUN:Cr 6:1, Switch home p.o. to IV Protonix  Transfusing now, Hb recheck at midnight    Unspecified encephalopathy  May be due to hypoglycemia, infection, liver dz, medication  Continue lactulose for potential hepatic encephalopathy  Given recent steroids, try stress dose steroid IV  Check procalcitonin  CT head re-assuring   Hold gabapentin, oxycodone  Tox screen was negative    Sepsis  Leukocytosis may be infection or recent steroid  Unclear source  UA, BCx pending  Continue ceftriaxone  Check procalcitonin  Given recent hospitalizations, add vancomycin check nasal MRSA    ID-T2DM  Significantly dose reduce lantus  sliding scale insulin overnight    Transaminitis  Alcoholic cirrhosis  Bilirubinemia  Elevated INR  Gi consult as above  Lactulose given, scheduled rectally until awake  GI to see    Hyperkalemia  Hyponatremia  Hypoalbuminemia  Giving IV Lasix now  Hold Prozac, spironolactone, can restart per GI when appropriate    Pulmonary edema  Likely secondary to anemia  Last echo < 1 mo ago with 65% EF  Intake, output  Daily weights  Weight appears to be up 15 pounds in the past month    Anticipated Discharge: 8/19    DVTP: held  Code Status: Prior Full Code  Disposition: Inpatient   Estimated body mass index is 26.63 kg/m  as calculated from the following:    Height as  "of 8/4/22: 1.651 m (5' 5\").    Weight as of this encounter: 72.6 kg (160 lb).  Wt Readings from Last 5 Encounters:   08/14/22 72.6 kg (160 lb)   08/04/22 69.1 kg (152 lb 6.4 oz)   08/02/22 67.6 kg (149 lb)   07/11/22 59 kg (130 lb)   07/09/22 65.8 kg (145 lb)       History of Present Illness  Dillon Salter presents because his mother found him unresponsive aside from a few eye movements, clammy and hypoglycemic on home equipment. Mom called EMS. Last night he missed lactulose. Reportedly patient has been more lethargic today.  On scene EMS noted blood glucose 35 and gave 25 g D10 with glucose response to 191, but poor clinical response. He has been more confused more the past 2 days. Has still been sober since April. Has just been increased to weekly RBC transfusions. Has reported chills to mom. Was on prednisone for 30 days, today was the first day not taking    Discharge from 8/2/22  Dillon Salter is a 26 year old male with a past medical history of alcohol use disorder sober since April 2022, alcoholic liver cirrhosis, esophageal varices status post banding, recurrent anemia requiring transfusion, autism spectrum disorder, IDDM 2 who was admitted on 7/28/2022 for hyperkalemia, severe hyperglycemia.  No evidence of DKA.  Hyperkalemia corrected for protocol, hyperglycemia treated as well with adjustment in insulin.  Leukocytosis with elevated procalcitonin, new murmur, recent history of MSSA bacteremia also empirically treated with vancomycin, blood cultures negative, vancomycin discontinued.  Lantus changed to 50 units daily, aspart 1 unit per 10 g of carb plus sliding scale insulin.  NPH was started in the hospital, discontinue to simplify the treatment regimen.  Patient is on a short course of prednisone, at follow-up with the oncology they will decide whether to continue prednisone 10 mg daily.  He will continue his twice weekly hemoglobin checks.  She did have BMP check at least once a week as well.   " Spironolactone decreased to 50 mg daily given hyperkalemia    ROS + Unable to obtain ROS due to AMS    In the ED, patient patient temp 96, vital signs stable, stable on room air.  Patient with numerous metabolic panel disturbances, mild transaminitis, ammonia 58, bilirubin 21.  Troponin 0.03.  Blood glucose initially normal and normal upon recheck.  Leukocytosis 16.4.  Was anemic 6.7 with RBC fragments, blood alcohol negative.  Noncon CT head negative.  Chest x-ray patchy perihilar opacities correlate with CHF. ED discussed with GI.  QTC on EKG reassuring    Physical exam:  Appears minimally responsive  Icteric conjunctiva, PERRL  moist mucous membranes, intact dentition  Trachea midline, no JVD  Crackles b/l, Respiratory effort normal on RA  rrr 80, systolic murmur  Abdomen soft, nondistended, nontender  edema edema, clubbing of nails absent  Skin normal temperature, dry  Unable to assess affect, somnolent  GCS 12  Urine in the room is very dark  Does not reliably follow commands  Vital signs reviewed by me    Wt Readings from Last 4 Encounters:   08/14/22 72.6 kg (160 lb)   08/04/22 69.1 kg (152 lb 6.4 oz)   08/02/22 67.6 kg (149 lb)   07/11/22 59 kg (130 lb)        reports that he has quit smoking. He has never used smokeless tobacco. He reports previous alcohol use. He reports previous drug use. Drug: Marijuana.  family history includes Substance Abuse in his father, maternal grandfather, and mother.   has a past surgical history that includes Esophagoscopy, gastroscopy, duodenoscopy (EGD), combined (N/A, 5/24/2022); PICC/Midline Placement (5/26/2022); Esophagoscopy, gastroscopy, duodenoscopy (EGD), combined (N/A, 6/20/2022); and PICC/Midline Placement (7/28/2022).   No Known Allergies    Braulio Negron MD, MPH  [facility]  [facility]  [facility]   Phone: #[phone]

## 2022-08-15 NOTE — PHARMACY-VANCOMYCIN DOSING SERVICE
Pharmacy Vancomycin Initial Note  Date of Service 2022  Patient's  1995  26 year old, male    Indication: Sepsis    Current estimated CrCl = Estimated Creatinine Clearance: 110.6 mL/min (based on SCr of 0.98 mg/dL).    Creatinine for last 3 days  2022:  5:09 PM Creatinine 0.98 mg/dL    Recent Vancomycin Level(s) for last 3 days  No results found for requested labs within last 72 hours.      Vancomycin IV Administrations (past 72 hours)      No vancomycin orders with administrations in past 72 hours.                Nephrotoxins and other renal medications (From now, onward)    Start     Dose/Rate Route Frequency Ordered Stop    22 2300  vancomycin 1000 mg in 0.9% NaCl 250 mL intermittent infusion 1,000 mg         1,000 mg  over 60 Minutes Intravenous EVERY 12 HOURS 22 215            Contrast Orders - past 72 hours (72h ago, onward)    None          [employer] Prediction of Planned Initial Vancomycin Regimen  Loading dose: N/A  Regimen: 1000 mg IV every 12 hours.  Start time: 2300 on 2022  Exposure target: AUC24 (range)400-600 mg/L.hr   AUC24,ss: 510 mg/L.hr  Probability of AUC24 > 400: 94 %  Ctrough,ss: 16.4 mg/L  Probability of Ctrough,ss > 20: 12 %  Probability of nephrotoxicity (Lodise MANUEL ): 12 %          Plan:  1. Start vancomycin  1000 mg IV q12h.   2. Vancomycin monitoring method: AUC  3. Vancomycin therapeutic monitoring goal: 400-600 mg*h/L  4. Pharmacy will check vancomycin levels as appropriate in 1-3 Days.    5. Serum creatinine levels will be ordered daily for the first week of therapy and at least twice weekly for subsequent weeks.      Sylvain Bah [facility]

## 2022-08-15 NOTE — PROGRESS NOTES
Hgb of 6.8 this AM (usual low 7s)- will prep 1 unit PRBC,  if (+) consent, will proceed. He already had 1 unit from ED.     555A - RN indicates (+) consent, and patient OKd to get another PRBC ---- ordered

## 2022-08-15 NOTE — PROGRESS NOTES
[facility] MEDICINE PROGRESS NOTE      Identification/Summary: Dillon Salter is a 26 year old male with a past medical history of autism spectrum disorder, anxiety/depression, alcoholic cirrhosis diagnosed April 2022, esophageal varices status post banding, hepatic encephalopathy, thrombocytopenia who was admitted on 8/14/2022 for unresponsiveness in the setting of hypoglycemia and having missed a lactulose dose.  He was found hypoglycemic by his mother, and transported via EMS.    Hospital course is notable for presenting with unspecified encephalopathy and severe anemia requiring 2 units transfusion.  He was also treated for sepsis with ceftriaxone and vancomycin.  Vancomycin was discontinued.  Given recent corticosteroids, he was given single dose stress dose steroids. gastroenterology was consulted and performed paracentesis.  Hematology oncology was consulted    Additionally d-dimer noted to be elevated    Assessment and Plan:  Severe macrocytic anemia  Thrombocytopenia  Elevated D-dimer  Heme consulted, appreciate help interpreting d-dimer  Dried blood noted nares, denies epistaxis, may have trickle bleed posterior  TTP, DIC on differential although lower suspicion  Continue IV protonix  Hb more stable today, recheck this am     Unspecified encephalopathy  May be due to hypoglycemia, infection, liver dz, medication  Continue lactulose + rifaximin for potential hepatic encephalopathy  Monitor off steroids for worsening mental status  Holding gabapentin, oxycodone     Sepsis  Unclear source, paracentesis today, procalcitonin elevated  BCx pending  Continue ceftriaxone  MRSA swab negative, clinically improved today, stop vanc monitor closely    Insulin-dependent type 2 diabetes  Resume Lantus today, will use altered schedule to return to his baseline dose  Continue sliding-scale    Transaminitis  Alcoholic cirrhosis with ascites  Bilirubinemia  Elevated INR  Continue daily  "weights    Hyponatremia  Stable, low Na diet per GI recs  Prozac, spironolactone held currently    Clinically Significant Risk Factors Present on Admission             # Hypoalbuminemia: Albumin = 2.7 g/dL (Ref range: 3.5 - 5.0 g/dL); 2.7 g/dL (Ref range: 3.5 - 5.0 g/dL) on admission, will monitor as appropriate   # Coagulation Defect: INR = 1.68 (Ref range: 0.85 - 1.15) and/or PTT = 42 Seconds (Ref range: 22 - 38 Seconds) on admission, will monitor for bleeding  # Thrombocytopenia: Plts = 86 10e3/uL (Ref range: 150 - 450 10e3/uL) on admission, will monitor for bleeding     # Overweight: Estimated body mass index is 27.92 kg/m  as calculated from the following:    Height as of this encounter: 1.676 m (5' 6\").    Weight as of this encounter: 78.5 kg (173 lb).        Diet: Advance Diet as Tolerated: Regular Diet Adult; Moderate Consistent Carb (60 g CHO per Meal) Diet; 2 gm NA Diet  DVT Prophylaxis:  VTE Prophylaxis contraindicated due to anemia  Code Status: Full Code    Anticipated possible discharge in 1 days to 2 once paracentesis and stable hemoglobin milestones are met.  Disposition Plan      Expected Discharge Date: 08/17/2022      Destination: home with family  Discharge Comments: Paracentesis today? Repeat Hgb        The patient's care was discussed with the Patient, Patient's Family and GI Consultant.    Braulio Negron MD, MPH  Hospitalist,[facility]: [facility]  Phone: #[phone]    Interval History/Subjective:  Patient much more alert and awake today which is good news from mom's perspective.  He did require an additional unit of blood overnight.  Patient says he is feeling much better, closer to his baseline.  Overnight Lantus was held, unclear on chart review who made that decision.  Further back story: Patient has had dried blood in his nares intermittently, more so in the past week.  Patient recently had an increase of furosemide and noted increased diuresis " outpatient prior to presentation.    Physical Exam/Objective:  I personally reviewed the vital signs for the past 24 hours  Wt Readings from Last 5 Encounters:   08/15/22 78.5 kg (173 lb)   08/04/22 69.1 kg (152 lb 6.4 oz)   08/02/22 67.6 kg (149 lb)   07/11/22 59 kg (130 lb)   07/09/22 65.8 kg (145 lb)     Body mass index is 27.92 kg/m .    Constitutional: Appears much more alert today, interactive, nad  Eyes: icteric, pallorous conjunctiva  HENT: dentition intact, mucous membranes moist, dried blood in nares  Neck: No masses, no lymphadenopathy, trachea midline  Lungs: CTA B/L, normal WOB  Cardiovascular: rrr, no murmurs/rubs  Gastrointestinal: Soft, nontender, bowel sounds present,   Extremities: no deformity, no edema  Skin: Normal temperature and texture, no rashes or ulcers of visible skin, jaundiced  Psych: Normal mood and affect, alert and oriented ×3    Data reviewed today: I personally reviewed all new medications, labs, imaging/diagnostics reports over the past 24 hours.    Medications:   Personally Reviewed.  Medications       sodium chloride 0.9%  500 mL Intravenous Once     cefTRIAXone  1 g Intravenous Q24H     insulin aspart  1-6 Units Subcutaneous Q4H     insulin glargine  20 Units Subcutaneous BID     lactulose  20 g Oral BID     pantoprazole (PROTONIX) IV  40 mg Intravenous BID     rifaximin  550 mg Oral BID     sodium chloride (PF)  3 mL Intracatheter Q8H     vancomycin  1,000 mg Intravenous Q12H

## 2022-08-15 NOTE — PLAN OF CARE
Problem: Hyperglycemia  Goal: Blood Glucose Level Within Targeted Range  Outcome: Ongoing, Progressing    Problem: Anemia  Goal: Anemia Symptom Improvement  Outcome: Ongoing, Progressing  Intervention: Monitor and Manage Anemia    Pt's Hgb low on admission and this AM.  Pt received one unit PRBC's in ED and one this AM.  Pt's glucose low on admission and upon arrival to 3[facility].  2200 dose Lantus held.  Continue to monitor.

## 2022-08-15 NOTE — CONSULTS
"GI CONSULT NOTE      Name: Dillon Salter  Medical Record #: [id number]  YOB: 1995  Date of Admission: 8/14/2022  Date/Time: 8/15/2022/8:00 AM     CHIEF COMPLAINT: Unresponsive    HISTORY OF PRESENT ILLNESS: We were asked to see Dillon Salter by Dr. Negron for \"encephalopathy, alcoholic cirrhosis, anemia.\" History obtained from mom, patient and chart review.      Dillon Salter is a 26 year old year old male with history of Autism spectrum disorder, anxiety/depression, and alcoholic cirrhosis diagnosed 4/2022, complicated by esophageal varices s/p banding, encephalopathy on lactulose and rifaximin, thrombocytopenia. He is now admitted after he was found unresponsive by his mother, EMS noted blood glucose of 35 on arrival.      He did have a liver clinic follow up at the [facility] in early May, but presented to [facility] later in May with a variceal bleed and underwent EGD with banding 5/24/2022.  Repeat EGD during admission 6/20/22 showed small esophageal varices, ulcers from previous banding, friable mucosa and portal hypertensive gastropathy. He has been treated with lactulose and rifaximin at home, per ED note, he missed one lactulose dose the evening prior to arrival. He has followed with hematology for chronic anemia, hemolysis and on average is receiving PRBC infusions once a week. A trial of prednisone did not improve his hemoglobin, so this was recently weaned. He has not had recent hepatology follow-up with the .     He was brought to the ER yesterday by EMS after he was found unresponsive. EMS found blood glucose of 35 and he was given dextrose. Hemoglobin was 6.7, he received 1 unit PRBCs. Recheck 6.8 this AM, another unit planned. Oral lactulose was started yesterday, as well as empiric ceftriaxone for SBP. He has not had ascites on most recent abdominal imaging (7/18/22). Head CT unremarkable. He is now alert and orientated. He takes lactulose 2x a day at home, not on rifaximin " due to prior auth issues. He has at least 5 stools a day at home. Had several loose nonbloody stools after lactulose yesterday. Abdomen feels intermittently bloating, but currently soft and nontender. No signs GI bleeding.     REVIEW OF SYSTEMS (ROS): Complete review of systems negative other than listed in HPI.    PAST MEDICAL HISTORY:  Past Medical History:   Diagnosis Date     Diabetes (H)       FAMILY HISTORY:  Family History   Problem Relation Age of Onset     Substance Abuse Mother      Substance Abuse Father      Substance Abuse Maternal Grandfather      SOCIAL HISTORY:  Social History     Socioeconomic History     Marital status: Single     Spouse name: Not on file     Number of children: Not on file     Years of education: Not on file     Highest education level: Not on file   Occupational History     Not on file   Tobacco Use     Smoking status: Former Smoker     Smokeless tobacco: Never Used   Vaping Use     Vaping Use: Former   Substance and Sexual Activity     Alcohol use: Not Currently     Drug use: Not Currently     Types: Marijuana     Sexual activity: Not on file   Other Topics Concern     Not on file   Social History Narrative    26-year-old history of autism/Asperger's on the spectrum graduated high school at [employer] worked at  and then another  place until the Covid epidemic in 2020 lives with parents (Leticia and Wes) socially isolated drinks alcohol beer daily        End-stage cirrhosis noted on admission to  April 2022     Social Determinants of Health     Financial Resource Strain: Not on file   Food Insecurity: Not on file   Transportation Needs: Not on file   Physical Activity: Not on file   Stress: Not on file   Social Connections: Not on file   Intimate Partner Violence: Not on file   Housing Stability: Not on file     MEDICATIONS PRIOR TO ADMISSION:   Medications Prior to Admission   Medication Sig Dispense Refill Last Dose     fluconazole (DIFLUCAN) 150 MG tablet Take 150  mg by mouth daily as needed Patient to use for 7 days if yeast infection flare up.        FLUoxetine (PROZAC) 40 MG capsule Take 60 mg by mouth At Bedtime   8/13/2022     folic acid (FOLVITE) 1 MG tablet Take 1 tablet (1 mg) by mouth daily 90 tablet 1 8/13/2022     furosemide (LASIX) 40 MG tablet Take 1 tablet (40 mg) by mouth 2 times daily 30 tablet 3 8/13/2022     hydrOXYzine (ATARAX) 25 MG tablet Take 25 mg by mouth 3 times daily as needed for itching        insulin aspart (NOVOLOG FLEXPEN) 100 UNIT/ML pen 1 unit per 10 grams of carb, plus additional units based on following scale   For Pre-Meal  - 164 give 1 unit. For Pre-Meal  - 189 give 2 units. For Pre-Meal  - 214 give 3 units. For Pre-Meal  - 239 give 4 units. For Pre-Meal  - 264 give 5 units. For Pre-Meal  - 289 give 6 units. For Pre-Meal  - 314 give 7 units. For Pre-Meal  - 339 give 8 units. For Pre-Meal  - 364 give 9 units.  For Pre-Meal BG greater than or equal to 365 give 10 units 15 mL 3 8/13/2022 at pm     insulin glargine (LANTUS SOLOSTAR) 100 UNIT/ML pen Inject 50 Units Subcutaneous At Bedtime 3 mL 0 8/13/2022 at pm     lactulose (CHRONULAC) 10 GM/15ML solution Take 30 mLs (20 g) by mouth 4 times daily (Patient taking differently: Take 20 g by mouth 2 times daily) 1892 mL 1 8/13/2022     multivitamin, therapeutic (THERA-VIT) TABS tablet Take 1 tablet by mouth in the morning.   8/13/2022     oxyCODONE (ROXICODONE) 5 MG tablet Take 5 mg by mouth every 4 hours as needed for severe pain        predniSONE (DELTASONE) 10 MG tablet Take 1 tablet (10 mg) by mouth daily (Patient taking differently: Take 5 mg by mouth daily Take through 8/20) 30 tablet 0 8/13/2022     spironolactone (ALDACTONE) 100 MG tablet Take 0.5 tablets (50 mg) by mouth daily   8/13/2022     thiamine (B-1) 100 MG tablet Take 1 tablet (100 mg) by mouth in the morning. 30 tablet 0 8/13/2022     alcohol swab prep pads Use to swab area of  "injection/marsha as directed. 100 each 3      blood glucose (NO BRAND SPECIFIED) test strip Use to test blood sugar 4 times daily or as directed. To accompany: Blood Glucose Monitor Brands: per insurance. 100 strip 6      blood glucose monitoring (NO BRAND SPECIFIED) meter device kit Use to test blood sugar 4 times daily or as directed. Preferred blood glucose meter OR supplies to accompany: Blood Glucose Monitor Brands: per insurance. 1 kit 0      gabapentin (NEURONTIN) 100 MG capsule Take 100 mg by mouth daily as needed        insulin pen needle (ULTICARE MICRO) 32G X 4 MM miscellaneous Use pen needles daily or as directed. 100 each 3      pantoprazole (PROTONIX) 40 MG EC tablet Take 1 tablet (40 mg) by mouth 2 times daily 30 tablet 0      rifaximin (XIFAXAN) 550 MG TABS tablet Take 1 tablet (550 mg) by mouth 2 times daily 60 tablet 0 Not started yet     thin (NO BRAND SPECIFIED) lancets Use with lanceting device. To accompany: Blood Glucose Monitor Brands: per insurance. 100 each 6         ALLERGIES: Patient has no known allergies.    PHYSICAL EXAM:    BP (!) 168/78 (BP Location: Left arm)   Pulse 103   Temp 98.7  F (37.1  C) (Oral)   Resp 18   Ht 1.676 m (5' 6\")   Wt 78.5 kg (173 lb)   SpO2 95%   BMI 27.92 kg/m      GENERAL: Pleasant, no obvious distress. Mother at bedside.   NECK: Supple without adenopathy  EYES: Bilateral scleral icterus  LUNGS: Clear to auscultation bilaterally  HEART: Regular rate and rhythm, S1 and S2 present, 1+ lower extremity edema  ABDOMEN: Non-distended. Positive bowel sounds. Soft, non-tender, no guarding/rebound/mass, no obvious organomegaly  MUSKULOSKELETAL:  Warm and well perfused, moves all extremities well  SKIN: Positive jaundice  NEUROLOGIC: Alert and oriented  PSYCHIATRIC: Normal affect    LAB DATA:  CMP Results:   Recent Labs   Lab Test 08/15/22  0708 08/15/22  0448 08/15/22  0223 08/14/22  2042 08/14/22  1709 08/02/22  0602 08/02/22  0524 07/28/22  1510 07/28/22  1502 "   NA  --  133*  --   --  133*  --  130*   < > 123*   POTASSIUM  --  4.9  --   --  5.1*  --  4.7   < > 6.0*   CHLORIDE  --  96*  --   --  94*  --  92*   < > 83*   CO2  --  26  --   --  29  --  30   < > 33*   ANIONGAP  --  11  --   --  10  --  8   < > 7   * 258* 239*   < > 96   < > 155*   < > 647*   BUN  --  55*  --   --  66*  --  33*   < > 47*   CR  --  0.81  --   --  0.98  --  0.63*   < > 0.92   BILITOTAL  --  19.5*  --   --  21.4*  --   --   --  24.5*   ALKPHOS  --  163*  --   --  202*  --   --   --  303*   ALT  --  42  --   --  51*  --   --   --  59*   AST  --  98*  --   --  120*  --   --   --  86*    < > = values in this interval not displayed.      CBC  Recent Labs   Lab 08/15/22  0448 08/14/22  2346 08/14/22  1709 08/11/22  1124   WBC 12.6*  --  16.4* 18.5*   RBC 1.93*  --  1.94* 1.73*   HGB 6.8* 6.9* 6.7* 6.2*   HCT 19.0*  --  20.0* 18.0*   MCV 98  --  103* 104*   MCH 35.2*  --  34.5* 35.8*   MCHC 35.8  --  33.5 34.4   RDW 25.2*  --  25.7* 23.2*   PLT 86*  --  91* 98*     INR  Recent Labs   Lab 08/14/22  1709   INR 1.68*      Lipase   Date Value Ref Range Status   08/14/2022 50 0 - 52 U/L Final   07/28/2022 35 0 - 52 U/L Final   06/18/2022 81 (H) 0 - 52 U/L Final   04/06/2022 45 0 - 52 U/L Final       IMAGING:  EXAM: CT HEAD W/O CONTRAST  LOCATION: [facility]  DATE/TIME: 8/14/2022 6:04 PM     INDICATION: Altered mental status, found unresponsive  COMPARISON: None.  TECHNIQUE: Routine CT Head without IV contrast. Multiplanar reformats. Dose reduction techniques were used.     FINDINGS:  INTRACRANIAL CONTENTS: No intracranial hemorrhage, extraaxial collection, or mass effect.  No CT evidence of acute infarct. Normal parenchymal attenuation. Normal ventricles and sulci.      VISUALIZED ORBITS/SINUSES/MASTOIDS: No intraorbital abnormality. No paranasal sinus mucosal disease. No middle ear or mastoid effusion.     BONES/SOFT TISSUES: No acute abnormality.                                                                       IMPRESSION:  1.  No acute intracranial abnormality.    ASSESSMENT:  1. Change in mental status  26 year old male with history of ETOH cirrhosis, anemia, diabetes who was found to be unresponsive by his mother yesterday. Improved now with treatment of hypoglycemia and addition of lactulose Unclear cause for symptoms, may be HE or hypoglycemia. SBP also considered, although no history. We will attempt paracentesis. Doubt due only to anemia since he has tolerated hemoglobin 6-7 for some time now.   2. Alcoholic cirrhosis  26 year old male with history of alcohol abuse now sober 130 days with alcoholic cirrhosis complicated by esophageal varices, thrombocytopenia, anemia. MELD 25.   A) Hepatic encephalopathy  Continue lactulose 2x daily and add rifaximin 550mg BID (our office can try to help with PA so we can get this at home).   B) Anemia  Due to liver disease and hemolysis. Receiving 2nd unit PRBCs. This is chronic. No signs of overt GI bleeding. Recommend he continue to follow-up with hematology.   C) Esophageal varices  Recent EGD showed small varices. Will leave timing of repeat EGD up to liver team (has appt this week).   D) ? Ascites  No significant or tapable ascites seen on prior imaging. Will attempt diagnostic paracentesis.     PLAN:  1. Attempt diagnostic paracentesis today  2. Low NA diet  3. Lactulose 20g 2x daily, titrate to 3-4 stools daily   4. Rifaximin 550mg BID  5. Keep follow-up with [facility] liver clinic (scheduled Wednesday). Will reschedule in the event he is still admitted.   6. Continue twice daily PPI    Will discuss with Dr. Goetz, GI attending.     TIME SPENT: 50 min including chart review, patient interview and care coordination.                                                NATHALY RestrepoC  Thank you for the opportunity to participate in the care of this patient.   Please feel free to call me with any questions or concerns.  Phone number [phone]  [phone].          GI ATTENDING BRIEF ADDENDUM:  The patient was seen and examined.  Discussed with Batool Nunez PA-C.  Agree with findings and plan as above. Patient is 25 yo M with etoh cirrhosis c/b HE, ascites, small varices, with history of anemia due to liver disease and hemolysis followed by hematology, sober since April, admitted with change in MS in setting of hypoglycemia and missing lactulose dose. Now back to baseline. No overt gi blood loss. Agree with diagnostic para to exclude sbp. Continue lactulose, rifaximin. Follow up with [facility] clinic as scheduled. Will sign off. Call with questions.     Approximately 20 minutes of total time was spent providing patient care, including patient evaluation, reviewing documentation/tests, and .    Marianne Goetz MD  [facility]

## 2022-08-15 NOTE — PROGRESS NOTES
Patient's blood sugar was 404. MD notified and was given 6 units of sliding scale novolog given. MD ordered 1x dose of 10 units novolog as well. Future insulin orders changed - see orders. Will continue to monitor. Yudith Moreira RN

## 2022-08-15 NOTE — PLAN OF CARE
Patient alert and oriented. Blood pressures high, otherwise VSS. Pt on tele reading sinus tach. Denies pain. Multiple, incontinent loose stools. Blood sugars in the 300's. 1 unit of blood finished; hgb recheck was 7.3. Pt on 2 g NA and 60 g carb diet. Q4 blood sugar checks.

## 2022-08-15 NOTE — PROGRESS NOTES
[facility] Inpatient Progress Note    Patient: Dillon Salter  MRN: [id number]  Date of Service:  08/15/2022            Assessment and Plan:    1.  Anemia: Chronic anemia.  Multifactorial etiology including blood loss, decreased production secondary to cirrhosis, splenic sequestration, possible hemolysis secondary to liver disease.  Recent fibrinogen, B12, folate, TSH, and ferritin were at appropriate levels.  KARLY was negative on June 7, 2022.  D-dimer is commonly elevated in cirrhosis due to increased fibrinolytic activity in the absence of thrombus. Recheck peripheral smear for spur cells. Transfuse to keep hemoglobin above 7. Check reticulocyte count.  His baseline hemoglobin appears to be in the mid 7 range.    History of Present Illness:    Mr. Dillon Salter is a 26-year-old gentleman with history of cirrhosis, portal hypertension and esophageal varices who is admitted with mental status changes.  He was found to have hypoglycemia.  Also has had acute on chronic anemia requiring 2 blood transfusions.  Feeling better today.    Review of Systems    As above in the history.     Review of Systems otherwise Negative for:  General: chills, fever or night sweats  Psychological: anxiety or depression  Ophthalmic: blurry vision, double vision or loss of vision, vision change  ENT: epistaxis, oral lesions, hearing changes  Hematological and Lymphatic: bleeding, bruising, jaundice, swollen lymph nodes  Endocrine: hot flashes, unexpected weight changes  Respiratory: cough, hemoptysis, orthopnea or shortness of breath/GONZALEZ  Cardiovascular: chest pain, edema, palpitations or PND  Gastrointestinal: abdominal pain, blood in stools, change in bowel habits, constipation, diarrhea or nausea/vomiting  Genito-Urinary: change in urinary stream, incontinence, frequency/urgency  Musculoskeletal: joint pain, stiffness, swelling, muscle pain  Neurological: dizziness, headaches,  "numbness/tingling  Dermatological: lumps and rash    Physical Exam    BP (!) 151/70 (BP Location: Left arm)   Pulse 110   Temp 98.3  F (36.8  C) (Oral)   Resp 18   Ht 1.676 m (5' 6\")   Wt 78.5 kg (173 lb)   SpO2 95%   BMI 27.92 kg/m      General: patient appears stated age of 26 year old. Nontoxic and in no distress.   HEENT: Head: atraumatic, normocephalic.  icteric sclera.  Chest:  Normal respiratory effort  Cardiac:  No edema.   Abdomen: abdomen is non-distended  Extremities: normal tone and muscle bulk.  Skin: no lesions or rash. Warm and dry.   CNS: alert and oriented. Grossly non-focal.   Psychiatric: normal mood and affect.     Lab Results    Recent Results (from the past 24 hour(s))   Blood Culture Artery, Carotid, Right    Collection Time: 08/14/22  5:17 PM    Specimen: Artery, Carotid, Right; Blood   Result Value Ref Range    Culture No growth after 12 hours    Asymptomatic COVID-19 Virus (Coronavirus) by PCR Nasopharyngeal    Collection Time: 08/14/22  5:21 PM    Specimen: Nasopharyngeal; Swab   Result Value Ref Range    SARS CoV2 PCR Negative Negative   Adult Type and Screen    Collection Time: 08/14/22  5:50 PM   Result Value Ref Range    ABO/RH(D) A NEG     Antibody Screen Negative Negative    SPECIMEN EXPIRATION DATE [id number]    Prepare red blood cells (unit)    Collection Time: 08/14/22  6:45 PM   Result Value Ref Range    CROSSMATCH Compatible     UNIT ABO/RH A Neg     Unit Number [id number]     Unit Status Issued     Blood Component Type Red Blood Cells     Product Code [id number]     CODING SYSTEM [id number]     UNIT TYPE ISBT 0600     ISSUE DATE AND TIME [id number]    UA with Microscopic reflex to Culture    Collection Time: 08/14/22  7:05 PM    Specimen: Urine, Clean Catch   Result Value Ref Range    Color Urine Yellow Colorless, Straw, Light Yellow, Yellow    Appearance Urine Clear Clear    Glucose Urine Negative Negative mg/dL    Bilirubin Urine Negative Negative    Ketones " Urine Negative Negative mg/dL    Specific Gravity Urine 1.016 1.001 - 1.030    Blood Urine 0.03 mg/dL (A) Negative    pH Urine 6.5 5.0 - 7.0    Protein Albumin Urine Negative Negative mg/dL    Urobilinogen Urine 8.0 (A) <2.0 mg/dL    Nitrite Urine Negative Negative    Leukocyte Esterase Urine Negative Negative    Bacteria Urine Few (A) None Seen /HPF    Mucus Urine Present (A) None Seen /LPF    RBC Urine <1 <=2 /HPF    WBC Urine 1 <=5 /HPF    Squamous Epithelials Urine <1 <=1 /HPF   Drugs of Abuse 1+ Panel, Urine ([facility] Only)    Collection Time: 08/14/22  7:05 PM   Result Value Ref Range    Amphetamines Urine Screen Negative Screen Negative    Benzodiazepines Urine Screen Negative Screen Negative    Opiates Urine Screen Negative Screen Negative    PCP Urine Screen Negative Screen Negative    Cannabinoids Urine Screen Negative Screen Negative    Barbiturates Urine Screen Negative Screen Negative    Cocaine Urine Screen Negative Screen Negative    Methadone Urine Screen Negative Screen Negative    Oxycodone Urine Screen Negative Screen Negative    Creatinine Urine mg/dL 42 mg/dL   Glucose by meter    Collection Time: 08/14/22  8:42 PM   Result Value Ref Range    GLUCOSE BY METER POCT 55 (L) 70 - 99 mg/dL   Glucose by meter    Collection Time: 08/14/22  9:35 PM   Result Value Ref Range    GLUCOSE BY METER POCT 84 70 - 99 mg/dL   Glucose by meter    Collection Time: 08/14/22 10:42 PM   Result Value Ref Range    GLUCOSE BY METER POCT 105 (H) 70 - 99 mg/dL   MRSA MSSA PCR, Nasal Swab    Collection Time: 08/14/22 11:22 PM    Specimen: Nares, Bilateral; Swab   Result Value Ref Range    MRSA Target DNA Negative Negative    SA Target DNA Positive    Procalcitonin    Collection Time: 08/14/22 11:46 PM   Result Value Ref Range    Procalcitonin 1.08 (H) 0.00 - 0.49 ng/mL   Magnesium    Collection Time: 08/14/22 11:46 PM   Result Value Ref Range    Magnesium 2.1 1.8 - 2.6 mg/dL   Hemoglobin    Collection Time: 08/14/22 11:46 PM    Result Value Ref Range    Hemoglobin 6.9 (LL) 13.3 - 17.7 g/dL   Glucose by meter    Collection Time: 08/14/22 11:51 PM   Result Value Ref Range    GLUCOSE BY METER POCT 118 (H) 70 - 99 mg/dL   Glucose by meter    Collection Time: 08/15/22  2:23 AM   Result Value Ref Range    GLUCOSE BY METER POCT 239 (H) 70 - 99 mg/dL   Glucose by meter    Collection Time: 08/15/22  4:08 AM   Result Value Ref Range    GLUCOSE BY METER POCT 267 (H) 70 - 99 mg/dL   Comprehensive metabolic panel    Collection Time: 08/15/22  4:48 AM   Result Value Ref Range    Sodium 133 (L) 136 - 145 mmol/L    Potassium 4.9 3.5 - 5.0 mmol/L    Chloride 96 (L) 98 - 107 mmol/L    Carbon Dioxide (CO2) 26 22 - 31 mmol/L    Anion Gap 11 5 - 18 mmol/L    Urea Nitrogen 55 (H) 8 - 22 mg/dL    Creatinine 0.81 0.70 - 1.30 mg/dL    Calcium 8.8 8.5 - 10.5 mg/dL    Glucose 258 (H) 70 - 125 mg/dL    Alkaline Phosphatase 163 (H) 45 - 120 U/L    AST 98 (H) 0 - 40 U/L    ALT 42 0 - 45 U/L    Protein Total 6.8 6.0 - 8.0 g/dL    Albumin 2.7 (L) 3.5 - 5.0 g/dL    Bilirubin Total 19.5 (HH) 0.0 - 1.0 mg/dL    GFR Estimate >90 >60 mL/min/1.73m2   Ammonia    Collection Time: 08/15/22  4:48 AM   Result Value Ref Range    Ammonia 52 (H) 11 - 35 umol/L   CBC with platelets    Collection Time: 08/15/22  4:48 AM   Result Value Ref Range    WBC Count 12.6 (H) 4.0 - 11.0 10e3/uL    RBC Count 1.93 (L) 4.40 - 5.90 10e6/uL    Hemoglobin 6.8 (LL) 13.3 - 17.7 g/dL    Hematocrit 19.0 (L) 40.0 - 53.0 %    MCV 98 78 - 100 fL    MCH 35.2 (H) 26.5 - 33.0 pg    MCHC 35.8 31.5 - 36.5 g/dL    RDW 25.2 (H) 10.0 - 15.0 %    Platelet Count 86 (L) 150 - 450 10e3/uL   Albumin level    Collection Time: 08/15/22  4:48 AM   Result Value Ref Range    Albumin 2.7 (L) 3.5 - 5.0 g/dL   Prepare red blood cells (unit)    Collection Time: 08/15/22  5:30 AM   Result Value Ref Range    CROSSMATCH Compatible     UNIT ABO/RH A Neg     Unit Number [id number]     Unit Status Issued     Blood Component Type  Red Blood Cells     Product Code [id number]     CODING SYSTEM [id number]     UNIT TYPE ISBT 0600     ISSUE DATE AND TIME [id number]    Glucose by meter    Collection Time: 08/15/22  7:08 AM   Result Value Ref Range    GLUCOSE BY METER POCT 228 (H) 70 - 99 mg/dL   Glucose by meter    Collection Time: 08/15/22  8:24 AM   Result Value Ref Range    GLUCOSE BY METER POCT 331 (H) 70 - 99 mg/dL   Glucose by meter    Collection Time: 08/15/22 11:32 AM   Result Value Ref Range    GLUCOSE BY METER POCT 383 (H) 70 - 99 mg/dL   Hemoglobin    Collection Time: 08/15/22 11:44 AM   Result Value Ref Range    Hemoglobin 7.3 (L) 13.3 - 17.7 g/dL   Glucose by meter    Collection Time: 08/15/22  4:20 PM   Result Value Ref Range    GLUCOSE BY METER POCT 404 (H) 70 - 99 mg/dL     Imaging    XR Chest 1 View    Result Date: 2022  EXAM: XR CHEST 1 VIEW LOCATION: [facility] DATE/TIME: 2022 5:57 PM INDICATION: sob COMPARISON: 2022     IMPRESSION: Low lung volumes with crowding the pulmonary vasculature. Patchy perihilar opacity and central peribronchial cuffing please correlate clinically for CHF. No pneumothorax. No pleural effusion. Mild cardiomegaly.    Echocardiogram Complete    Result Date: 2022  [id number]^[place] [address]  Name: EYAL ROONEY MRN: [id number] : 1995 Study Date: 2022 08:46 AM Age: 26 yrs Gender: Male Patient Location: [facility] Reason For Study: Murmur, Endocarditis Ordering Physician: DAR QUIROZ Performed By: CM  BSA: 1.7 m2 Height: 65 in Weight: 133 lb HR: 99 ______________________________________________________________________________ Procedure Complete Echo Adult. Definity (NDC #45736-189) given intravenously. ______________________________________________________________________________ Interpretation Summary  Left ventricular size, wall motion and function are normal. The  ejection fraction is 60-65%. Normal right ventricle size and systolic function. No hemodynamically significant valvular abnormalities on 2D or color flow imaging. ______________________________________________________________________________ Left Ventricle Left ventricular size, wall motion and function are normal. The ejection fraction is 60-65%. There is borderline concentric left ventricular hypertrophy. Left ventricular diastolic function is normal. No regional wall motion abnormalities noted.  Right Ventricle Normal right ventricle size and systolic function.  Atria Normal left atrial size. Right atrial size is normal. There is no color Doppler evidence of an atrial shunt.  Mitral Valve Mitral valve leaflets appear normal. There is no evidence of mitral stenosis or clinically significant mitral regurgitation.  Tricuspid Valve Right ventricle systolic pressure estimate normal.  Aortic Valve Aortic valve leaflets appear normal. There is no evidence of aortic stenosis or clinically significant aortic regurgitation.  Pulmonic Valve The pulmonic valve is not well seen, but is grossly normal. This degree of valvular regurgitation is within normal limits. Mildly increased PV velocity.  Vessels The aorta root is normal. Normal size ascending aorta. IVC diameter <2.1 cm collapsing >50% with sniff suggests a normal RA pressure of 3 mmHg.  Pericardium There is no pericardial effusion.  ______________________________________________________________________________ MMode/2D Measurements & Calculations IVSd: 1.2 cm LVIDd: 4.6 cm LVIDs: 3.4 cm LVPWd: 1.1 cm  FS: 27.0 % LV mass(C)d: 191.7 grams LV mass(C)dI: 115.3 grams/m2 Ao root diam: 2.5 cm LA dimension: 4.2 cm asc Aorta Diam: 2.7 cm LA/Ao: 1.7 LVOT diam: 2.1 cm LVOT area: 3.3 cm2 LA Volume Indexed (AL/bp): 45.8 ml/m2 RWT: 0.46  Doppler Measurements & Calculations MV E max rebeca: 106.0 cm/sec MV A max rebeca: 88.4 cm/sec MV E/A: 1.2  MV dec slope: 1039 cm/sec2 MV dec time: 0.10  sec Ao V2 max: 238.9 cm/sec Ao max P.0 mmHg Ao V2 mean: 134.3 cm/sec Ao mean P.0 mmHg Ao V2 VTI: 40.2 cm THOR(I,D): 2.2 cm2 THOR(V,D): 2.2 cm2 LV V1 max PG: 10.4 mmHg LV V1 max: 161.1 cm/sec LV V1 VTI: 26.2 cm SV(LVOT): 87.2 ml SI(LVOT): 52.4 ml/m2 PA V2 max: 192.7 cm/sec PA max P.9 mmHg PA acc time: 0.08 sec AV Luca Ratio (DI): 0.67 THOR Index (cm2/m2): 1.3 E/E' av.7 Lateral E/e': 7.5 Medial E/e': 9.9  ______________________________________________________________________________ Report approved by: Sofya Saucedo 2022 09:28 AM       US Abdomen Limited    Result Date: 2022  EXAM: US ABDOMEN LIMITED LOCATION: [facility] DATE/TIME: 2022 2:50 PM INDICATION:  Alcoholic cirrhosis of liver with ascites (H) COMPARISON: None. TECHNIQUE: Limited abdominal ultrasound. FINDINGS: No abdominal ascites. Paracentesis not performed.     IMPRESSION: 1.  No ascites.     CT Head w/o Contrast    Result Date: 2022  EXAM: CT HEAD W/O CONTRAST LOCATION: [facility] DATE/TIME: 2022 6:04 PM INDICATION: Altered mental status, found unresponsive COMPARISON: None. TECHNIQUE: Routine CT Head without IV contrast. Multiplanar reformats. Dose reduction techniques were used. FINDINGS: INTRACRANIAL CONTENTS: No intracranial hemorrhage, extraaxial collection, or mass effect.  No CT evidence of acute infarct. Normal parenchymal attenuation. Normal ventricles and sulci. VISUALIZED ORBITS/SINUSES/MASTOIDS: No intraorbital abnormality. No paranasal sinus mucosal disease. No middle ear or mastoid effusion. BONES/SOFT TISSUES: No acute abnormality.     IMPRESSION: 1.  No acute intracranial abnormality.      Signed by: Serjio Henderson MD

## 2022-08-15 NOTE — PROGRESS NOTES
Care Management Follow Up    Length of Stay (days): 1    Expected Discharge Date: 08/16/2022     Concerns to be Addressed: low hemoglobin, discharge planning        Patient plan of care discussed at interdisciplinary rounds: Yes    Anticipated Discharge Disposition: Home with family     Anticipated Discharge Services: Pending clinical progress    Anticipated Discharge DME: Pending clinical progress    Education Provided on the Discharge Plan:  AVS per bedside RN.    Patient/Family in Agreement with the Plan:  yes      Additional Information:  Chart reviewed. CM following for discharge needs. Patient lives with family. Anticipate he will discharge home with family. Anticipate family will transport home at time of hospital discharge. CM will follow for discharge needs.       Eileen Kim RN

## 2022-08-16 ENCOUNTER — APPOINTMENT (OUTPATIENT)
Dept: ULTRASOUND IMAGING | Facility: CLINIC | Age: 27
DRG: 809 | End: 2022-08-16
Attending: PHYSICIAN ASSISTANT
Payer: COMMERCIAL

## 2022-08-16 VITALS
TEMPERATURE: 98 F | DIASTOLIC BLOOD PRESSURE: 75 MMHG | HEART RATE: 104 BPM | BODY MASS INDEX: 26.71 KG/M2 | OXYGEN SATURATION: 100 % | RESPIRATION RATE: 18 BRPM | WEIGHT: 166.2 LBS | HEIGHT: 66 IN | SYSTOLIC BLOOD PRESSURE: 147 MMHG

## 2022-08-16 LAB
ALBUMIN SERPL-MCNC: 3 G/DL (ref 3.5–5)
ALP SERPL-CCNC: 197 U/L (ref 45–120)
ALT SERPL W P-5'-P-CCNC: 44 U/L (ref 0–45)
ANION GAP SERPL CALCULATED.3IONS-SCNC: 8 MMOL/L (ref 5–18)
AST SERPL W P-5'-P-CCNC: 89 U/L (ref 0–40)
BILIRUB SERPL-MCNC: 20.7 MG/DL (ref 0–1)
BUN SERPL-MCNC: 50 MG/DL (ref 8–22)
CALCIUM SERPL-MCNC: 9.4 MG/DL (ref 8.5–10.5)
CHLORIDE BLD-SCNC: 99 MMOL/L (ref 98–107)
CO2 SERPL-SCNC: 28 MMOL/L (ref 22–31)
CREAT SERPL-MCNC: 0.78 MG/DL (ref 0.7–1.3)
ERYTHROCYTE [DISTWIDTH] IN BLOOD BY AUTOMATED COUNT: 24.5 % (ref 10–15)
GFR SERPL CREATININE-BSD FRML MDRD: >90 ML/MIN/1.73M2
GLUCOSE BLD-MCNC: 230 MG/DL (ref 70–125)
GLUCOSE BLDC GLUCOMTR-MCNC: 108 MG/DL (ref 70–99)
GLUCOSE BLDC GLUCOMTR-MCNC: 378 MG/DL (ref 70–99)
GLUCOSE BLDC GLUCOMTR-MCNC: 380 MG/DL (ref 70–99)
GLUCOSE BLDC GLUCOMTR-MCNC: 72 MG/DL (ref 70–99)
HCT VFR BLD AUTO: 22.2 % (ref 40–53)
HGB BLD-MCNC: 7.7 G/DL (ref 13.3–17.7)
MCH RBC QN AUTO: 33.8 PG (ref 26.5–33)
MCHC RBC AUTO-ENTMCNC: 34.7 G/DL (ref 31.5–36.5)
MCV RBC AUTO: 97 FL (ref 78–100)
PATH REPORT.COMMENTS IMP SPEC: NORMAL
PATH REPORT.COMMENTS IMP SPEC: NORMAL
PATH REPORT.FINAL DX SPEC: NORMAL
PATH REPORT.RELEVANT HX SPEC: NORMAL
PLATELET # BLD AUTO: 100 10E3/UL (ref 150–450)
POTASSIUM BLD-SCNC: 4.8 MMOL/L (ref 3.5–5)
PROT SERPL-MCNC: 7.4 G/DL (ref 6–8)
RBC # BLD AUTO: 2.28 10E6/UL (ref 4.4–5.9)
SODIUM SERPL-SCNC: 135 MMOL/L (ref 136–145)
WBC # BLD AUTO: 16 10E3/UL (ref 4–11)

## 2022-08-16 PROCEDURE — 80053 COMPREHEN METABOLIC PANEL: CPT | Performed by: HOSPITALIST

## 2022-08-16 PROCEDURE — 36415 COLL VENOUS BLD VENIPUNCTURE: CPT | Performed by: HOSPITALIST

## 2022-08-16 PROCEDURE — C9113 INJ PANTOPRAZOLE SODIUM, VIA: HCPCS | Performed by: HOSPITALIST

## 2022-08-16 PROCEDURE — 250N000011 HC RX IP 250 OP 636: Performed by: HOSPITALIST

## 2022-08-16 PROCEDURE — 250N000013 HC RX MED GY IP 250 OP 250 PS 637: Performed by: HOSPITALIST

## 2022-08-16 PROCEDURE — 250N000013 HC RX MED GY IP 250 OP 250 PS 637: Performed by: PHYSICIAN ASSISTANT

## 2022-08-16 PROCEDURE — 76705 ECHO EXAM OF ABDOMEN: CPT

## 2022-08-16 PROCEDURE — 99239 HOSP IP/OBS DSCHRG MGMT >30: CPT | Performed by: HOSPITALIST

## 2022-08-16 PROCEDURE — 85027 COMPLETE CBC AUTOMATED: CPT | Performed by: HOSPITALIST

## 2022-08-16 RX ORDER — LACTULOSE 10 G/15ML
20 SOLUTION ORAL 2 TIMES DAILY
Qty: 237 ML | Refills: 0 | Status: SHIPPED | OUTPATIENT
Start: 2022-08-16 | End: 2022-08-22

## 2022-08-16 RX ADMIN — LACTULOSE 20 G: 10 SOLUTION ORAL at 08:31

## 2022-08-16 RX ADMIN — INSULIN GLARGINE 20 UNITS: 100 INJECTION, SOLUTION SUBCUTANEOUS at 08:42

## 2022-08-16 RX ADMIN — PANTOPRAZOLE SODIUM 40 MG: 40 INJECTION, POWDER, FOR SOLUTION INTRAVENOUS at 08:31

## 2022-08-16 RX ADMIN — Medication 1000 MG: at 11:09

## 2022-08-16 RX ADMIN — RIFAXIMIN 550 MG: 550 TABLET ORAL at 08:31

## 2022-08-16 ASSESSMENT — ACTIVITIES OF DAILY LIVING (ADL)
ADLS_ACUITY_SCORE: 31

## 2022-08-16 NOTE — PROVIDER NOTIFICATION
Notified MD at 0040 AM regarding lab results.      Spoke with: Serjio Adamson    Orders were not obtained. Continue to monitor. Check BS at 2am.     Comments: BS recheck at midnight- 380. Do you want anything additional?

## 2022-08-16 NOTE — PLAN OF CARE
"Goal Outcome Evaluation:    Problem: Plan of Care - These are the overarching goals to be used throughout the patient stay.    Goal: Readiness for Transition of Care  Outcome: Met  Patient reported feeling \"much better today\". Blood sugars appear to be more controlled. Patient is discharging home with mother. Discharge teaching performed with patient and his mother. Discussed the importance of follow-up care and medication changes. Discharge information given and no further questions at this time. Yudith Moreira RN        "

## 2022-08-16 NOTE — PROGRESS NOTES
"GI PROGRESS NOTE  8/16/2022  Dillon Salter  1995  /-23    Subjective:   Feeling well. No new complaints. No ascites on US to tap.      Objective:     Blood pressure (!) 154/73, pulse 104, temperature 97.7  F (36.5  C), temperature source Oral, resp. rate 20, height 1.676 m (5' 6\"), weight 75.4 kg (166 lb 3.2 oz), SpO2 99 %.    Body mass index is 26.83 kg/m .  Gen: NAD  Eyes: Bilateral scleral icterus  Cardio: RRR  GI: Distended, BS positive, soft, non-tender  Skin: Jaundiced    Laboratory:  BMP  Recent Labs   Lab 08/16/22  1133 08/16/22  0825 08/16/22  0455 08/15/22  0708 08/15/22  0448 08/14/22  2042 08/14/22  1709   NA  --   --  135*  --  133*  --  133*   POTASSIUM  --   --  4.8  --  4.9  --  5.1*   CHLORIDE  --   --  99  --  96*  --  94*   SARTHAK  --   --  9.4  --  8.8  --  9.5   CO2  --   --  28  --  26  --  29   BUN  --   --  50*  --  55*  --  66*   CR  --   --  0.78  --  0.81  --  0.98   * 72 230*   < > 258*   < > 96    < > = values in this interval not displayed.     CBC  Recent Labs   Lab 08/16/22  0455 08/15/22  1748 08/15/22  1144 08/15/22  0448   WBC 16.0* 14.2*  --  12.6*   RBC 2.28* 2.30*  --  1.93*   HGB 7.7* 7.8* 7.3* 6.8*   HCT 22.2* 22.4*  --  19.0*   MCV 97 97  --  98   MCH 33.8* 33.9*  --  35.2*   MCHC 34.7 34.8  --  35.8   RDW 24.5* 24.8*  --  25.2*   * 82*  --  86*     INR  Recent Labs   Lab 08/14/22  1709   INR 1.68*      LFTs  Recent Labs   Lab Test 08/16/22  0455 08/15/22  0448 08/14/22  1905 08/14/22  1709 07/28/22  1612 07/28/22  1502 06/19/22  0644 06/18/22  1036 06/18/22  1035   ALBUMIN 3.0* 2.7*  2.7*  --  3.2*  --  3.4*   < >  --  3.1*   BILITOTAL 20.7* 19.5*  --  21.4*  --  24.5*   < >  --  15.1*   ALT 44 42  --  51*  --  59*   < >  --  47*   AST 89* 98*  --  120*  --  86*   < >  --  119*   PROTEIN  --   --  Negative  --  Negative  --   --  Negative  --    LIPASE  --   --   --  50  --  35  --   --  81*    < > = values in this interval not displayed. "       Imaging:  EXAM: CT HEAD W/O CONTRAST  LOCATION: [facility]  DATE/TIME: 8/14/2022 6:04 PM     INDICATION: Altered mental status, found unresponsive  COMPARISON: None.  TECHNIQUE: Routine CT Head without IV contrast. Multiplanar reformats. Dose reduction techniques were used.     FINDINGS:  INTRACRANIAL CONTENTS: No intracranial hemorrhage, extraaxial collection, or mass effect.  No CT evidence of acute infarct. Normal parenchymal attenuation. Normal ventricles and sulci.      VISUALIZED ORBITS/SINUSES/MASTOIDS: No intraorbital abnormality. No paranasal sinus mucosal disease. No middle ear or mastoid effusion.     BONES/SOFT TISSUES: No acute abnormality.                                                                      IMPRESSION:  1.  No acute intracranial abnormality.     Assessment:   1. Change in mental status  26 year old male with history of ETOH cirrhosis, anemia, diabetes who was found to be unresponsive by his mother 8/14/22. Improved now with treatment of hypoglycemia and addition of lactulose. Unclear cause for symptoms, may be HE or hypoglycemia. SBP also considered, although no history and no ascites on US. Doubt due only to anemia since he has tolerated hemoglobin 6-7 for some time now.   2. Alcoholic cirrhosis  History of alcohol abuse now sober 131 days with alcoholic cirrhosis complicated by esophageal varices, thrombocytopenia, anemia. MELD 25.   A) Hepatic encephalopathy  Continue lactulose 2x daily and add rifaximin 550mg BID (our office can try to help with PA so we can get this at home).   B) Anemia  Chronic. Likely due to liver disease and hemolysis. Hemoglobin 7.7.  No signs of overt GI bleeding. Hematology following.   C) Esophageal varices  Recent EGD showed small varices. Will leave timing of repeat EGD up to liver team (has appt this week).     Plan:   1. Lactulose 20g BID  2. Rifaximin 550mg BID (will look into Prior auth)  3. Continue alcohol  abstinence  4. Keep [facility] appt [facility] arrival time 2:50pm at home.     Ok from GI standpoint for discharge home. Discussed with nursing and hospitalist.                                                                          Batool Nunez PA-C  Thank you for the opportunity to participate in the care of this patient.   Please feel free to call me with any questions or concerns.  Phone number [phone].

## 2022-08-16 NOTE — PLAN OF CARE
Pt a/ox4. VSS on RA. Denied pain overnight. Tele: ST, HR low 100s. LS clear. No nausea overnight. BS high overnight- MD notified- gave one time additional 10 units novolog. Up independently in room- mother present at bedside overnight.     Problem: Plan of Care - These are the overarching goals to be used throughout the patient stay.    Goal: Optimal Comfort and Wellbeing  Outcome: Ongoing, Progressing     Problem: Hyperglycemia  Goal: Blood Glucose Level Within Targeted Range  Outcome: Ongoing, Progressing

## 2022-08-16 NOTE — PROVIDER NOTIFICATION
Notified MD at 050 AM regarding lab results.      Spoke with: Serjio Adamson    Orders were obtained.    Comments:  2am blood sugar 378- any new orders?

## 2022-08-16 NOTE — PROGRESS NOTES
Care Management Discharge Note    Discharge Date: 08/17/2022       Discharge Disposition: Home    Discharge Services: [facility], Other (see comment) (Pending clinical progress)    Discharge DME: Other (see comment) (Pending clinical progress)    Discharge Transportation: family or friend will provide    Private pay costs discussed: Not applicable    PAS Confirmation Code:    Patient/family educated on Medicare website which has current facility and service quality ratings: no    Education Provided on the Discharge Plan:    Persons Notified of Discharge Plans: yes  Patient/Family in Agreement with the Plan:  yes    Handoff Referral Completed: Yes    Additional Information:  Chart reviewed. Plan to discharge home today with mother. No discharge needs from .         Karen Hewitt RN

## 2022-08-16 NOTE — DISCHARGE SUMMARY
[facility] MEDICINE  DISCHARGE SUMMARY     Primary Care Physician: Gary Rodrigues  Admission Date: 8/14/2022   Discharge Provider: Braulio Negron MD Discharge Date: 8/16/2022   Diet:   Active Diet and Nourishment Order   Procedures     Advance Diet as Tolerated: Regular Diet Adult; Moderate Consistent Carb (60 g CHO per Meal) Diet; 2 gm NA Diet       Code Status: Full Code   Activity: DCACTIVITY: Activity as tolerated        Condition at Discharge: Stable     REASON FOR PRESENTATION(See Admission Note for Details)   Hypoglycemia    PRINCIPAL & ACTIVE DISCHARGE DIAGNOSES     Active Problems:    End stage liver disease (H)    End-stage liver disease (H)      PENDING LABS     Unresulted Labs Ordered in the Past 30 Days of this Admission     Date and Time Order Name Status Description    8/14/2022  5:04 PM Blood Culture Artery, Carotid, Right Preliminary             PROCEDURES ( this hospitalization only)          RECOMMENDATIONS TO OUTPATIENT PROVIDER FOR F/U VISIT     Recheck BMP, Hb  Titrate insulins  Monitor for blood loss  Restart prozac, spironolactone when appropriate  Monitor weights    DISPOSITION     Home    SUMMARY OF HOSPITAL COURSE:      Identification/Summary: Dillon Salter is a 26 year old male with a past medical history of autism spectrum disorder, anxiety/depression, alcoholic cirrhosis diagnosed April 2022, esophageal varices status post banding, hepatic encephalopathy, thrombocytopenia who was admitted on 8/14/2022 for unresponsiveness in the setting of hypoglycemia and having missed a lactulose dose.  He was found hypoglycemic by his mother, and transported via EMS.     Hospital course is notable for presenting with unspecified encephalopathy and severe anemia requiring 2 units transfusion.  He was also treated for sepsis with ceftriaxone and vancomycin.  As workup progressed, he was determined to not be septic and Abx were stopped.  Given recent  corticosteroids, he was given single dose stress dose steroids. He became fairly hyperglycemic thereafter and steroids were held. Gastroenterology was consulted. Paracentesis was unable to tap due to no ascites. Hematology oncology was consulted and felt his anemia was multifactorial (blood loss, decreased production, splenic sequestartion and possible hemolysis) and recommended transfusion goal of 7.    His lantus was reduced to 20u BID with his prior sliding scale insulin to prevent further hypoglycemia    Discharge Medications with Med changes:     Current Discharge Medication List      CONTINUE these medications which have NOT CHANGED    Details   fluconazole (DIFLUCAN) 150 MG tablet Take 150 mg by mouth daily as needed Patient to use for 7 days if yeast infection flare up.      FLUoxetine (PROZAC) 40 MG capsule Take 60 mg by mouth At Bedtime      folic acid (FOLVITE) 1 MG tablet Take 1 tablet (1 mg) by mouth daily  Qty: 90 tablet, Refills: 1    Associated Diagnoses: Alcoholic cirrhosis of liver with ascites (H)      furosemide (LASIX) 40 MG tablet Take 1 tablet (40 mg) by mouth 2 times daily  Qty: 30 tablet, Refills: 3    Associated Diagnoses: Alcoholic cirrhosis of liver with ascites (H)      hydrOXYzine (ATARAX) 25 MG tablet Take 25 mg by mouth 3 times daily as needed for itching      insulin aspart (NOVOLOG FLEXPEN) 100 UNIT/ML pen 1 unit per 10 grams of carb, plus additional units based on following scale   For Pre-Meal  - 164 give 1 unit. For Pre-Meal  - 189 give 2 units. For Pre-Meal  - 214 give 3 units. For Pre-Meal  - 239 give 4 units. For Pre-Meal  - 264 give 5 units. For Pre-Meal  - 289 give 6 units. For Pre-Meal  - 314 give 7 units. For Pre-Meal  - 339 give 8 units. For Pre-Meal  - 364 give 9 units.  For Pre-Meal BG greater than or equal to 365 give 10 units  Qty: 15 mL, Refills: 3    Associated Diagnoses: Diabetes mellitus type 2 in obese (H)       insulin glargine (LANTUS SOLOSTAR) 100 UNIT/ML pen Inject 50 Units Subcutaneous At Bedtime  Qty: 3 mL, Refills: 0    Comments: If Lantus is not covered by insurance, may substitute Basaglar or Semglee or other insulin glargine product per insurance preference at same dose and frequency.    Associated Diagnoses: Diabetes mellitus type 2 in obese (H)      lactulose (CHRONULAC) 10 GM/15ML solution Take 30 mLs (20 g) by mouth 4 times daily  Qty: 1892 mL, Refills: 1    Associated Diagnoses: Alcoholic cirrhosis of liver with ascites (H)      multivitamin, therapeutic (THERA-VIT) TABS tablet Take 1 tablet by mouth in the morning.    Associated Diagnoses: Alcoholic cirrhosis of liver with ascites (H)      oxyCODONE (ROXICODONE) 5 MG tablet Take 5 mg by mouth every 4 hours as needed for severe pain      predniSONE (DELTASONE) 10 MG tablet Take 1 tablet (10 mg) by mouth daily  Qty: 30 tablet, Refills: 0    Associated Diagnoses: Alcoholic cirrhosis of liver with ascites (H)      spironolactone (ALDACTONE) 100 MG tablet Take 0.5 tablets (50 mg) by mouth daily    Associated Diagnoses: Alcoholic cirrhosis of liver with ascites (H)      thiamine (B-1) 100 MG tablet Take 1 tablet (100 mg) by mouth in the morning.  Qty: 30 tablet, Refills: 0    Associated Diagnoses: Alcoholic cirrhosis of liver with ascites (H)      alcohol swab prep pads Use to swab area of injection/marsha as directed.  Qty: 100 each, Refills: 3    Associated Diagnoses: Diabetes mellitus type 2 in obese (H)      blood glucose (NO BRAND SPECIFIED) test strip Use to test blood sugar 4 times daily or as directed. To accompany: Blood Glucose Monitor Brands: per insurance.  Qty: 100 strip, Refills: 6    Associated Diagnoses: Diabetes mellitus type 2 in obese (H)      blood glucose monitoring (NO BRAND SPECIFIED) meter device kit Use to test blood sugar 4 times daily or as directed. Preferred blood glucose meter OR supplies to accompany: Blood Glucose Monitor Brands:  per insurance.  Qty: 1 kit, Refills: 0    Associated Diagnoses: Diabetes mellitus type 2 in obese (H)      gabapentin (NEURONTIN) 100 MG capsule Take 100 mg by mouth daily as needed      insulin pen needle (ULTICARE MICRO) 32G X 4 MM miscellaneous Use pen needles daily or as directed.  Qty: 100 each, Refills: 3    Associated Diagnoses: Diabetes mellitus type 2 in obese (H)      pantoprazole (PROTONIX) 40 MG EC tablet Take 1 tablet (40 mg) by mouth 2 times daily  Qty: 30 tablet, Refills: 0    Associated Diagnoses: Alcohol intoxication with delirium (H)      rifaximin (XIFAXAN) 550 MG TABS tablet Take 1 tablet (550 mg) by mouth 2 times daily  Qty: 60 tablet, Refills: 0    Associated Diagnoses: Alcoholic cirrhosis of liver with ascites (H)      thin (NO BRAND SPECIFIED) lancets Use with lanceting device. To accompany: Blood Glucose Monitor Brands: per insurance.  Qty: 100 each, Refills: 6    Associated Diagnoses: Diabetes mellitus type 2 in obese (H)                   Rationale for medication changes:      Hypoglycemia - lantus reduction  Rifaximin - hepatic encephalopathy  Prozac, spironolactone - hyponatremia, hyperkalemia        Consults       CARE MANAGEMENT / SOCIAL WORK IP CONSULT  GASTROENTEROLOGY IP CONSULT  HEMATOLOGY & ONCOLOGY IP CONSULT  PHARMACY TO DOSE VANCO    Immunizations given this encounter     Most Recent Immunizations   Administered Date(s) Administered     COVID-19,PF,Pfizer (12+ Yrs) 01/10/2022     DTAP (<7y) 08/24/1999     DTP-Hib 03/03/1995     HPV Quadrivalent 12/03/2014     Hep B, Peds or Adolescent 06/29/2007     Influenza (H1N1) 01/19/2010     Influenza (IIV3) PF 10/22/2012     Influenza Vaccine IM > 6 months Valent IIV4 (Alfuria,Fluzone) 01/10/2022     MMR 08/24/1999     Meningococcal (Menactra ) 01/19/2010     OPV, trivalent, live 03/03/1995     Pneumococcal 23 valent 10/22/2012     Tdap (Adacel,Boostrix) 01/03/2017     Varicella 06/29/2007           Anticoagulation Information       Recent INR results:   Recent Labs   Lab 08/14/22  1709   INR 1.68*           SIGNIFICANT IMAGING FINDINGS     Results for orders placed or performed during the hospital encounter of 08/14/22   CT Head w/o Contrast    Impression    IMPRESSION:  1.  No acute intracranial abnormality.   XR Chest 1 View    Impression    IMPRESSION: Low lung volumes with crowding the pulmonary vasculature. Patchy perihilar opacity and central peribronchial cuffing please correlate clinically for CHF. No pneumothorax. No pleural effusion. Mild cardiomegaly.   US Abdomen Limited    Impression    IMPRESSION: No significant fluid. Safe paracentesis could not be performed and was deferred.       SIGNIFICANT LABORATORY FINDINGS     Discharge today labs  Blood glucose 108  Sodium 135  Bilirubin 20  WBC 16 (has received steroid)  Hemoglobin 7  Platelets 100    Discharge Orders         Examination   Physical Exam   Temp:  [97.7  F (36.5  C)-98.5  F (36.9  C)] 97.7  F (36.5  C)  Pulse:  [102-110] 104  Resp:  [18-20] 20  BP: (135-156)/(66-77) 154/73  SpO2:  [95 %-99 %] 99 %  Wt Readings from Last 1 Encounters:   08/16/22 75.4 kg (166 lb 3.2 oz)   Constitutional: alert today, interactive, nad in the bed  Eyes: icteric, pallorous conjunctiva  HENT: dentition intact, mucous membranes moist  Neck: No masses, no lymphadenopathy, trachea midline  Lungs: CTA B/L, normal WOB  Cardiovascular: rrr, no murmurs/rubs  Gastrointestinal: Soft, nontender, bowel sounds present,   Extremities: no deformity, no edema  Skin: Normal temperature and texture, no rashes or ulcers of visible skin, jaundiced  Psych: Normal mood and affect, alert and oriented ×3    Please see EMR for more detailed significant labs, imaging, consultant notes etc.    IBraulio MD, personally saw the patient today and spent greater than 30 minutes discharging this patient.    Braulio Negron MD  [facility]    CC:Gary Rodrigues

## 2022-08-16 NOTE — PLAN OF CARE
Goal Outcome Evaluation:      Problem: Hyperglycemia  Goal: Blood Glucose Level Within Targeted Range  Outcome: Ongoing, Not Progressing      Problem: Plan of Care - These are the overarching goals to be used throughout the patient stay.    Goal: Optimal Comfort and Wellbeing  Outcome: Ongoing, Progressing     Problem: Anemia  Goal: Anemia Symptom Improvement  Outcome: Ongoing, Progressing   Patient is up independently in his room. Tolerating activity fairly well at this time. Does report some dyspnea with exertion. Continues to be on IV abx. Prn zofran utilized for patient's nausea. Reports loose stools throughout day - did not want to take lactulose at bedtime. GI ordered paracentesis - awaiting to hear plan/when this will take place. Patient continued to have elevated blood sugars. Hospitalist notified of bedtime blood sugar of 456. Instructed to give sliding scale and scheduled lantus - then recheck blood sugar at midnight. Patient's mother was present throughout day - supportive. Call light placed within reach and purposeful rounding performed. Yudith Moreira RN

## 2022-08-16 NOTE — PLAN OF CARE
Patient alert & oriented. VSS. Denies pain. Paracentesis attempted with no fluids to remove today. Q4 blood sugar checks; no sliding scale needed. Possible discharge this evening.

## 2022-08-18 ENCOUNTER — HOSPITAL ENCOUNTER (EMERGENCY)
Facility: CLINIC | Age: 27
Discharge: HOME | End: 2022-08-18
Attending: EMERGENCY MEDICINE | Admitting: EMERGENCY MEDICINE
Payer: COMMERCIAL

## 2022-08-18 ENCOUNTER — INFUSION THERAPY VISIT (OUTPATIENT)
Dept: INFUSION THERAPY | Facility: CLINIC | Age: 27
End: 2022-08-18
Attending: INTERNAL MEDICINE
Payer: COMMERCIAL

## 2022-08-18 ENCOUNTER — APPOINTMENT (OUTPATIENT)
Dept: CT IMAGING | Facility: CLINIC | Age: 27
End: 2022-08-18
Attending: EMERGENCY MEDICINE
Payer: COMMERCIAL

## 2022-08-18 VITALS
TEMPERATURE: 99 F | HEART RATE: 99 BPM | RESPIRATION RATE: 16 BRPM | SYSTOLIC BLOOD PRESSURE: 124 MMHG | DIASTOLIC BLOOD PRESSURE: 63 MMHG | OXYGEN SATURATION: 92 %

## 2022-08-18 VITALS
SYSTOLIC BLOOD PRESSURE: 146 MMHG | RESPIRATION RATE: 18 BRPM | HEART RATE: 102 BPM | OXYGEN SATURATION: 99 % | TEMPERATURE: 98.1 F | DIASTOLIC BLOOD PRESSURE: 80 MMHG

## 2022-08-18 DIAGNOSIS — S01.81XA LACERATION OF FOREHEAD, INITIAL ENCOUNTER: ICD-10-CM

## 2022-08-18 DIAGNOSIS — D62 ACUTE POSTHEMORRHAGIC ANEMIA: Primary | ICD-10-CM

## 2022-08-18 DIAGNOSIS — S09.90XA INJURY OF HEAD, INITIAL ENCOUNTER: ICD-10-CM
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
## 2022-08-18 LAB
ABO/RH(D): NORMAL
ACANTHOCYTES BLD QL SMEAR: ABNORMAL
ANTIBODY SCREEN: NEGATIVE
BASO STIPL BLD QL SMEAR: PRESENT
BASOPHILS # BLD MANUAL: 0.2 10E3/UL (ref 0–0.2)
BASOPHILS NFR BLD MANUAL: 1 %
BLD PROD TYP BPU: NORMAL
BLOOD COMPONENT TYPE: NORMAL
BURR CELLS BLD QL SMEAR: SLIGHT
CODING SYSTEM: NORMAL
CROSSMATCH: NORMAL
EOSINOPHIL # BLD MANUAL: 0.2 10E3/UL (ref 0–0.7)
EOSINOPHIL NFR BLD MANUAL: 1 %
ERYTHROCYTE [DISTWIDTH] IN BLOOD BY AUTOMATED COUNT: 23.3 % (ref 10–15)
HCT VFR BLD AUTO: 19.2 % (ref 40–53)
HGB BLD-MCNC: 6.6 G/DL (ref 13.3–17.7)
ISSUE DATE AND TIME: NORMAL
LYMPHOCYTES # BLD MANUAL: 2.5 10E3/UL (ref 0.8–5.3)
LYMPHOCYTES NFR BLD MANUAL: 16 %
MCH RBC QN AUTO: 34.4 PG (ref 26.5–33)
MCHC RBC AUTO-ENTMCNC: 34.4 G/DL (ref 31.5–36.5)
MCV RBC AUTO: 100 FL (ref 78–100)
METAMYELOCYTES # BLD MANUAL: 0.2 10E3/UL
METAMYELOCYTES NFR BLD MANUAL: 1 %
MONOCYTES # BLD MANUAL: 1.2 10E3/UL (ref 0–1.3)
MONOCYTES NFR BLD MANUAL: 8 %
NEUTROPHILS # BLD MANUAL: 11.3 10E3/UL (ref 1.6–8.3)
NEUTROPHILS NFR BLD MANUAL: 73 %
PLAT MORPH BLD: ABNORMAL
PLATELET # BLD AUTO: 96 10E3/UL (ref 150–450)
POLYCHROMASIA BLD QL SMEAR: SLIGHT
RBC # BLD AUTO: 1.92 10E6/UL (ref 4.4–5.9)
RBC MORPH BLD: ABNORMAL
SPECIMEN EXPIRATION DATE: NORMAL
UNIT ABO/RH: NORMAL
UNIT NUMBER: NORMAL
UNIT STATUS: NORMAL
UNIT TYPE ISBT: 600
WBC # BLD AUTO: 15.5 10E3/UL (ref 4–11)

## 2022-08-18 PROCEDURE — 85027 COMPLETE CBC AUTOMATED: CPT | Performed by: INTERNAL MEDICINE

## 2022-08-18 PROCEDURE — 99284 EMERGENCY DEPT VISIT MOD MDM: CPT | Mod: 25

## 2022-08-18 PROCEDURE — 85007 BL SMEAR W/DIFF WBC COUNT: CPT | Performed by: INTERNAL MEDICINE

## 2022-08-18 PROCEDURE — 86923 COMPATIBILITY TEST ELECTRIC: CPT | Performed by: INTERNAL MEDICINE

## 2022-08-18 PROCEDURE — 86901 BLOOD TYPING SEROLOGIC RH(D): CPT | Performed by: INTERNAL MEDICINE

## 2022-08-18 PROCEDURE — P9016 RBC LEUKOCYTES REDUCED: HCPCS | Performed by: INTERNAL MEDICINE

## 2022-08-18 PROCEDURE — 36415 COLL VENOUS BLD VENIPUNCTURE: CPT | Performed by: INTERNAL MEDICINE

## 2022-08-18 PROCEDURE — 12011 RPR F/E/E/N/L/M 2.5 CM/<: CPT

## 2022-08-18 PROCEDURE — 86850 RBC ANTIBODY SCREEN: CPT | Performed by: INTERNAL MEDICINE

## 2022-08-18 PROCEDURE — 36430 TRANSFUSION BLD/BLD COMPNT: CPT

## 2022-08-18 PROCEDURE — 250N000009 HC RX 250: Performed by: EMERGENCY MEDICINE

## 2022-08-18 PROCEDURE — 70450 CT HEAD/BRAIN W/O DYE: CPT

## 2022-08-18 RX ORDER — HEPARIN SODIUM,PORCINE 10 UNIT/ML
5 VIAL (ML) INTRAVENOUS
Status: CANCELLED | OUTPATIENT
Start: 2022-08-18

## 2022-08-18 RX ORDER — HEPARIN SODIUM (PORCINE) LOCK FLUSH IV SOLN 100 UNIT/ML 100 UNIT/ML
5 SOLUTION INTRAVENOUS
Status: CANCELLED | OUTPATIENT
Start: 2022-08-18

## 2022-08-18 RX ADMIN — Medication 3 ML: at 15:10

## 2022-08-18 ASSESSMENT — ACTIVITIES OF DAILY LIVING (ADL): ADLS_ACUITY_SCORE: 35

## 2022-08-18 NOTE — ED TRIAGE NOTES
Fell after being seen at infusion center.  No loss of consciousness. C/O head pain, lac on forehead covered by EMS. Left elbow wound dressed by EMS.  Pt A/Ox4. CMS intact distally. Says slight trip caused fall.

## 2022-08-18 NOTE — DISCHARGE INSTRUCTIONS
Fortunately your head CT today was normal.  Your laceration was repaired with 3 stitches.  These should be removed in approximately 5 days.  Follow-up with your primary care doctor or urgent care for suture removal.  Return to the ER for any worsening symptoms or other concerns.

## 2022-08-18 NOTE — ED PROVIDER NOTES
EMERGENCY DEPARTMENT ENCOUnter      NAME: Dillon Salter  AGE: 26 year old male  YOB: 1995  MRN: [id number]  EVALUATION DATE & TIME: 2022  2:09 PM    PCP: Gary Rodrigues    ED PROVIDER: Joshua Cheema DO      Chief Complaint   Patient presents with     Fall         FINAL IMPRESSION:  1. Fall, initial encounter    2. Injury of head, initial encounter    3. Laceration of forehead, initial encounter          ED COURSE & MEDICAL DECISION MAKIN:48 PM I met with the patient for the initial interview and physical examination in Room 2. Discussed plan for treatment and workup in the ED.      The patient presented to the emergency department today after falling and striking his forehead.  He suffered a small horizontal laceration to the left forehead and a left elbow abrasion.  His forehead laceration was repaired with three 6-0 Prolene sutures.  CT of the head was unremarkable.  Plan will be for discharge home with outpatient follow-up.  He is comfortable with this plan.    At the conclusion of the encounter I discussed the results of all of the tests and the disposition. The questions were answered. The patient or family acknowledged understanding and was agreeable with the care plan.          =================================================================    HPI        Dillon Salter is a 26 year old male with a pertinent history of hypertension, diabetes mellitus type 2, alcoholic cirrhosis of liver with ascites, and anemia who presents to this ED via amublance for evaluation of fall.    Per patient's mother, patient has had leg swelling that they are trying to get under control with medications at the moment.    Patient reports that he had tripped and fallen after having been seen at the infusion center and fell resulting in a small laceration to the forehead and had scraped up his left elbow. He notes that he also has a mild headache caused by the head injury. Patient states that  the bleeding had completely stopped before the injuries were even bandaged up by EMS. Patient gets weekly blood transfusions and is currently 133 days sober from alcohol. Patient denies loss of consciousness or additional symptoms or complaints at this time.       REVIEW OF SYSTEMS     Constitutional:  Denies fever or chills  HENT:  Denies sore throat   Respiratory:  Denies cough or shortness of breath   Cardiovascular:  Denies chest pain or palpitations  GI:  Denies abdominal pain, nausea, or vomiting  Musculoskeletal:  Denies any new extremity pain   Skin:  Denies rash. Positive for abrasion to left elbow and laceration to left side of forehead   Neurologic:  Denies focal weakness or sensory changes. Positive for mild headache  All other systems reviewed and are negative      PAST MEDICAL HISTORY:  Past Medical History:   Diagnosis Date     Diabetes (H)        PAST SURGICAL HISTORY:  Past Surgical History:   Procedure Laterality Date     ESOPHAGOSCOPY, GASTROSCOPY, DUODENOSCOPY (EGD), COMBINED N/A 5/24/2022    Procedure: ESOPHAGOGASTRODUODENOSCOPY (EGD) with esophageal banding;  Surgeon: Gary Hurst MD;  Location: [facility] OR     ESOPHAGOSCOPY, GASTROSCOPY, DUODENOSCOPY (EGD), COMBINED N/A 6/20/2022    Procedure: ESOPHAGOGASTRODUODENOSCOPY;  Surgeon: Gavino Reza MD;  Location: [facility] Main OR     MIDLINE DOUBLE LUMEN PLACEMENT  5/26/2022          PICC TRIPLE LUMEN PLACEMENT  7/28/2022                CURRENT MEDICATIONS:    alcohol swab prep pads  blood glucose (NO BRAND SPECIFIED) test strip  blood glucose monitoring (NO BRAND SPECIFIED) meter device kit  fluconazole (DIFLUCAN) 150 MG tablet  folic acid (FOLVITE) 1 MG tablet  furosemide (LASIX) 40 MG tablet  insulin aspart (NOVOLOG FLEXPEN) 100 UNIT/ML pen  insulin glargine (LANTUS PEN) 100 UNIT/ML pen  insulin pen needle (ULTICARE MICRO) 32G X 4 MM miscellaneous  lactulose (CHRONULAC) 10 GM/15ML solution  multivitamin, therapeutic  (THERA-VIT) TABS tablet  oxyCODONE (ROXICODONE) 5 MG tablet  pantoprazole (PROTONIX) 40 MG EC tablet  rifaximin (XIFAXAN) 550 MG TABS tablet  thiamine (B-1) 100 MG tablet  thin (NO BRAND SPECIFIED) lancets        ALLERGIES:  No Known Allergies    FAMILY HISTORY:  Family History   Problem Relation Age of Onset     Substance Abuse Mother      Substance Abuse Father      Substance Abuse Maternal Grandfather        SOCIAL HISTORY:   Social History     Socioeconomic History     Marital status: Single     Spouse name: None     Number of children: None     Years of education: None     Highest education level: None   Tobacco Use     Smoking status: Former Smoker     Smokeless tobacco: Never Used   Vaping Use     Vaping Use: Former   Substance and Sexual Activity     Alcohol use: Not Currently     Drug use: Not Currently     Types: Marijuana   Social History Narrative    26-year-old history of autism/Asperger's on the spectrum graduated high school at [employer] worked at [employer] and then another  place until the Covid epidemic in 2020 lives with parents (Leticia and Wes) socially isolated drinks alcohol beer daily        End-stage cirrhosis noted on admission to  April 2022       VITALS:  Patient Vitals for the past 24 hrs:   BP Temp Pulse Resp SpO2   08/18/22 1606 124/63 -- 99 16 92 %   08/18/22 1500 112/58 -- 99 -- 91 %   08/18/22 1445 124/60 -- 101 -- 92 %   08/18/22 1430 124/58 -- 104 -- 92 %   08/18/22 1418 118/55 99  F (37.2  C) 105 16 94 %       PHYSICAL EXAM    Constitutional:  Well developed, Well nourished,  HENT:  Normocephalic, Bilateral external ears normal, Oropharynx moist, Nose normal. 1 cm horizontal laceration to left forehead with no active bleeding  Neck:  Normal range of motion, No meningismus, No stridor.   Eyes:  EOMI, Conjunctiva normal, No discharge.   Respiratory:  Normal breath sounds, No respiratory distress, No wheezing, No chest tenderness.   Cardiovascular:  Normal heart rate, Normal  rhythm, No murmurs  GI:  Soft, No tenderness, No guarding, No CVA tenderness. Moderate abdominal distension   Musculoskeletal:   No tenderness to palpation or major deformities noted. Moderate lower extremity edema  Integument:  Warm, Dry. Diffusely jaundiced, Superficial abrasion to left elbow  Neurologic:  Alert & oriented x 3, Normal motor function, Normal sensory function, No focal deficits noted.   Psychiatric:  Affect normal, Judgment normal, Mood normal.       RADIOLOGY:  I have independently reviewed and interpreted the above imaging, pending the final radiology read.  CT Head w/o Contrast   Final Result   IMPRESSION:   1.  No acute intracranial process.   2.  Left frontal scalp laceration.            PROCEDURES:    PROCEDURE: Laceration Repair   INDICATIONS: Laceration   PROCEDURE PROVIDER: Dr Joshua Cheema   SITE: Forehead   TYPE/SIZE: simple, superficial, clean and no foreign body visualized  1 cm (total length)   FUNCTIONAL ASSESSMENT: Distal sensation, circulation and motor intact   MEDICATION: 0 mLs    PREPARATION: scrubbing with Betadine   DEBRIDEMENT: no debridement   CLOSURE:  Superficial layer closed with 3 stitches of 6-0 Prolene simple interrupted    Total number of sutures/staples placed: 3            I, Antonio Barbosa, am serving as a scribe to document services personally performed by Dr. Cheema based on my observation and the provider's statements to me. I, Joshua Cheema, DO attest that Antonio Barbosa is acting in a scribe capacity, has observed my performance of the services and has documented them in accordance with my direction.    Joshua Cheema, DO  Emergency Medicine  [facility] EMERGENCY ROOM  [address] [id number] [phone]  Dept: [phone]     Joshua Cheema MD  08/18/22 [id number]

## 2022-08-18 NOTE — PROGRESS NOTES
Infusion Nursing Note:  Dillon Salter presents today for blood transfusion.    Patient seen by provider today: No   present during visit today: Not Applicable.    Note: BP (!) 146/80   Pulse 102   Temp 98.1  F (36.7  C) (Oral)   Resp 18   SpO2 99% .    Intravenous Access:  Labs drawn without difficulty.  Peripheral IV placed.    Treatment Conditions:  Lab Results   Component Value Date    HGB 6.6 (LL) 08/18/2022    WBC 15.5 (H) 08/18/2022    ANEU 11.3 (H) 08/18/2022    ANEUTAUTO 10.8 (H) 08/04/2022    PLT 96 (L) 08/18/2022      Blood transfusion consent signed 06/27/22.  Hemoglobin 6.6 today, therefore 1 unit ordered.    Post Infusion Assessment:  1 unit of blood transfused. Patient tolerated infusion without incident.  Blood return noted pre and post infusion.  Site patent and intact, free from redness, edema or discomfort.  No evidence of extravasations.  Access discontinued per protocol.     Discharge Plan:   Patient and/or family verbalized understanding of discharge instructions and all questions answered.  Patient discharged in stable condition accompanied by: self.  Departure Mode: Ambulatory.      Sony Mckeon RN

## 2022-08-19 LAB — BACTERIA BLD CULT: NO GROWTH

## 2022-08-22 ENCOUNTER — OFFICE VISIT (OUTPATIENT)
Dept: GASTROENTEROLOGY | Facility: CLINIC | Age: 27
End: 2022-08-22
Attending: PHYSICIAN ASSISTANT
Payer: COMMERCIAL

## 2022-08-22 VITALS
SYSTOLIC BLOOD PRESSURE: 113 MMHG | HEART RATE: 103 BPM | TEMPERATURE: 97.6 F | OXYGEN SATURATION: 97 % | WEIGHT: 187.2 LBS | DIASTOLIC BLOOD PRESSURE: 53 MMHG | BODY MASS INDEX: 30.21 KG/M2

## 2022-08-22 DIAGNOSIS — E44.0 MODERATE PROTEIN-CALORIE MALNUTRITION (H): ICD-10-CM

## 2022-08-22 DIAGNOSIS — K72.10 END STAGE LIVER DISEASE (H): ICD-10-CM

## 2022-08-22 DIAGNOSIS — D64.9 ANEMIA, UNSPECIFIED TYPE: ICD-10-CM

## 2022-08-22 DIAGNOSIS — K76.82 HEPATIC ENCEPHALOPATHY (H): Primary | ICD-10-CM

## 2022-08-22 DIAGNOSIS — K70.31 ALCOHOLIC CIRRHOSIS OF LIVER WITH ASCITES (H): ICD-10-CM

## 2022-08-22 PROCEDURE — G0463 HOSPITAL OUTPT CLINIC VISIT: HCPCS

## 2022-08-22 PROCEDURE — 99215 OFFICE O/P EST HI 40 MIN: CPT | Performed by: PHYSICIAN ASSISTANT

## 2022-08-22 RX ORDER — SPIRONOLACTONE 100 MG/1
200 TABLET, FILM COATED ORAL DAILY
Qty: 60 TABLET | Refills: 5 | Status: SHIPPED | OUTPATIENT
Start: 2022-08-22 | End: 2022-09-06

## 2022-08-22 RX ORDER — PANTOPRAZOLE SODIUM 40 MG/1
40 TABLET, DELAYED RELEASE ORAL DAILY
Qty: 30 TABLET | Refills: 3 | Status: ON HOLD | OUTPATIENT
Start: 2022-08-22 | End: 2023-03-17

## 2022-08-22 RX ORDER — CALCIUM CARBONATE/VITAMIN D3 500-10/5ML
LIQUID (ML) ORAL
COMMUNITY
End: 2023-01-21

## 2022-08-22 RX ORDER — LACTULOSE 10 G/15ML
10-20 SOLUTION ORAL 2 TIMES DAILY
Qty: 946 ML | Refills: 4 | Status: SHIPPED | OUTPATIENT
Start: 2022-08-22 | End: 2022-11-18

## 2022-08-22 RX ORDER — BUMETANIDE 1 MG/1
2 TABLET ORAL DAILY
Qty: 60 TABLET | Refills: 5 | Status: SHIPPED | OUTPATIENT
Start: 2022-08-22 | End: 2022-09-06

## 2022-08-22 ASSESSMENT — PAIN SCALES - GENERAL: PAINLEVEL: WORST PAIN (10)

## 2022-08-22 NOTE — NURSING NOTE
Chief Complaint   Patient presents with     RECHECK     Return visit - 3 mo. Follow up.     Blood pressure 113/53, pulse 103, temperature 97.6  F (36.4  C), weight 84.9 kg (187 lb 3.2 oz), SpO2 97 %.    KAYLYN MENJIVAR

## 2022-08-22 NOTE — LETTER
8/22/2022         RE: Dillon Salter  [address] [facility] [address]        Dear Colleague,    Thank you for referring your patient, Dillon Salter, to the [facility] [place]. Please see a copy of my visit note below.    Hepatology Follow-up Clinic note  Dillon Salter   Date of Birth 1995  Date of Service 8/22/2022    Reason for follow-up: Cirrhosis          Assessment/plan:   Dillon Salter is a 26 year old male with history of EtOH cirrhosis, complicated by variceal bleed s/p banding, ascites controlled with diuretics and generalized anasarca,  hepatic encephalopathy. Significantly fluid overloaded today. He continues to have moderate liver dysfunction, total bilirubin of 20.7 which is primarily indirect suggesting likely hemolysis.  Last direct bilirubin month ago was 6.5. MELD-Na 25.  Last drank alcohol April 6th.      He has continued to require PRBC transfusions 1-2 times a week. We discussed importance of good nutrition as his liver recovers.     # Anasarca:   -Discontinue furosemide  -Start bumetanide 2 mg once daily.  -Increase sprionolactone 100 twice daily   -BMP in on week to see about increase in dose.     # Anemia:   -We will discuss decreasing threshold of transfusion to hemoglobin less than 6.5 with his hematologist    # Continue optimization of blood glucose     #Hepatic encephalopathy, controlled:   - Continue lactulose and dose to 3-5 bowel movements a day  - Continue rifaximin 550 mg twice daily    # Continue absolute sobriety from alcohol     # Follow-up with me in one month     Momo Walters PA-C   [facility] Hepatology clinic    Total time for E/M services performed on the date of the encounter 60 minutes.  This included review of previous: clinic visits, hospital records, lab results, imaging studies, and procedural documentation. Time also includes patient visit, documentation and discussion with other providers.  The findings  from this review are summarized in the above note.   -----------------------------------------------------       HPI:   Dillon Salter is a 26 year old male presenting for follow-up. He is joined by his aunt and mother today.     Cirrhosis  Complicated by:  - Ascites  - HE  - Hx of variceal bleeding  EGD: 6/18/2922, Mild PHG, Grade I EV  HCC: 5/24/2022    Patient was last seen by Dr. Gerardo/Dr. Wood on 5/5/2022.   Hospitalized in July for hyperkalemia and severe hyperglycemia. Hospitalized a few weeks ago for hypoglycemia and unresponsiveness after missing a lactulose dose. Was in the ER within the past week after a fall leaving infusion.     Lantus dose has been adjusted so blood glucose levels have been much better controlled. Having a lot of pain on the left side of body from recent fall.  Wonders what he can do for pain.     Getting PRBC transfuse 1-2x a week since April    Appetite is pretty good, eating fruits and vegetables.   Limit carbohydrates to 60 gm a meal and also states good compliance with 2000 mg sodium diet.     Currently weight 180 lbs. Having a lot of swelling in legs. Currently taking Furosemide 40 mg twice daily. Spironlactone 100 mg.     Taking lactulose 30 mL twice a day and having 3-5 bowel movements a day. Also Taking 550 mg Xifaximin . No recent problems with confusion.      Patient also denies melena, hematochezia or hematemesis. Patient denies  fevers, sweats or chills.    Patient stated that he had undergone alcohol-related treatment in 2018 at [place] for 24 days followed by 18 days in [place] for a total of 42 days of sobriety.  That was his longest period of sobriety.     Last drank alcohol April 6th. Has had ROBERT assessment, but has not been able to establish outpatient treatment due to frequent hospitalizations.          Medications:     Current Outpatient Medications   Medication     alcohol swab prep pads     blood glucose (NO BRAND SPECIFIED) test strip     blood glucose  monitoring (NO BRAND SPECIFIED) meter device kit     fluconazole (DIFLUCAN) 150 MG tablet     folic acid (FOLVITE) 1 MG tablet     furosemide (LASIX) 40 MG tablet     insulin aspart (NOVOLOG FLEXPEN) 100 UNIT/ML pen     insulin glargine (LANTUS PEN) 100 UNIT/ML pen     insulin pen needle (ULTICARE MICRO) 32G X 4 MM miscellaneous     lactulose (CHRONULAC) 10 GM/15ML solution     magnesium oxide 400 MG CAPS     multivitamin, therapeutic (THERA-VIT) TABS tablet     pantoprazole (PROTONIX) 40 MG EC tablet     rifaximin (XIFAXAN) 550 MG TABS tablet     thiamine (B-1) 100 MG tablet     thin (NO BRAND SPECIFIED) lancets     oxyCODONE (ROXICODONE) 5 MG tablet     No current facility-administered medications for this visit.            Review of Systems:   10 points ROS was obtained and highlighted in the HPI, otherwise negative.          Physical Exam:   VS:  /53   Pulse 103   Temp 97.6  F (36.4  C)   Wt 84.9 kg (187 lb 3.2 oz)   SpO2 97%   BMI 30.21 kg/m        Gen: A&Ox3, drowsy, significant peripheral muscle wasting  HEENT: icteric sclera  CV: RRR, no overt murmurs  Lung: CTA Bilatererally, no wheezing or crackles.   Lym- no palpable lymphadenopathy  Abd: soft, NT, ND, pitting abdominal wall   Ext: intact pulses, moderate pitting pitting to upper legs  Skin: No rash, jaundiced  Neuro: grossly intact, mild asterixis (no lactulose today)  Psych: appropriate mood and affects         Data:   Reviewed in person and significant for:    Lab Results   Component Value Date     08/16/2022      Lab Results   Component Value Date    POTASSIUM 4.8 08/16/2022     Lab Results   Component Value Date    CHLORIDE 99 08/16/2022     Lab Results   Component Value Date    CO2 28 08/16/2022     Lab Results   Component Value Date    BUN 50 08/16/2022     Lab Results   Component Value Date    CR 0.78 08/16/2022       Lab Results   Component Value Date    WBC 15.5 08/18/2022     Lab Results   Component Value Date    HGB 6.6  08/18/2022     Lab Results   Component Value Date    HCT 19.2 08/18/2022     Lab Results   Component Value Date     08/18/2022     Lab Results   Component Value Date    PLT 96 08/18/2022       Lab Results   Component Value Date    AST 89 08/16/2022     Lab Results   Component Value Date    ALT 44 08/16/2022     No results found for: BILICONJ   Lab Results   Component Value Date    BILITOTAL 20.7 08/16/2022       Lab Results   Component Value Date    ALBUMIN 3.0 08/16/2022     Lab Results   Component Value Date    PROTTOTAL 7.4 08/16/2022      Lab Results   Component Value Date    ALKPHOS 197 08/16/2022       Lab Results   Component Value Date    INR 1.68 08/14/2022     EXAM: CT ABDOMEN PELVIS W CONTRAST  LOCATION: [facility]  DATE/TIME: 4/7/2022 12:37 AM     INDICATION: Abdominal distension.  COMPARISON: None.  TECHNIQUE: CT scan of the abdomen and pelvis was performed following injection of IV contrast. Multiplanar reformats were obtained. Dose reduction techniques were used.  CONTRAST: [id number] 80 ml.     FINDINGS:   LOWER CHEST: Multifocal rounded solid and groundglass nodular infiltrates with superimposed interstitial infiltrates in the visualized lungs.     HEPATOBILIARY: Hepatomegaly. Cirrhotic appearance to the liver with trace perihepatic ascites. Gallbladder is distended with wall thickening and sludge. Upper abdominal varices compatible with portal hypertension.     PANCREAS: Normal.     SPLEEN: Splenomegaly. Splenorenal shunt compatible with portal hypertension.     ADRENAL GLANDS: Normal.     KIDNEYS/BLADDER: Normal.     BOWEL: Normal.     LYMPH NODES: Normal.     VASCULATURE: Unremarkable.     PELVIC ORGANS: Calcification vas deferens compatible with diabetes. Anasarca.     MUSCULOSKELETAL: Normal.                                                                      IMPRESSION:   1.  Multifocal regions of solid, and groundglass nodularity in the visualized mid and lower  lungs with superimposed background of mild interstitial infiltrates.     2.  Cirrhosis with splenomegaly and small volume ascites. Multiple varices compatible with portal hypertension.     3.  Gallbladder is distended with wall thickening which can be seen with parenchymal disease. Sludge within the gallbladder lumen.     4.  Anasarca.      5/24/2022 10:50 AM     EXAM: US ABD HEPATIC and PORTAL VASCULATURE CMPL      INDICATION: abnormal liver tests, cirrhosis.     COMPARISON: Ultrasound 4/7/2022      TECHNIQUE: Gray-scale and color imaging of the right upper quadrant was performed.     FINDINGS:   PANCREAS: Normal where visualized, without focal mass identified. No pancreatic ductal dilatation is identified.     LIVER: The liver is coarsened in echogenicity with a peripheral nodular contour. Gallbladder sludge is noted biliary ductal dilatation.     OTHER: Small volume free fluid adjacent to the gallbladder.     Velocity (centimeters per second):  Main portal vein: 38  Left portal vein: 20  Right portal vein: 53  Left hepatic vein: 30  Middle hepatic vein: 19  Right hepatic vein: 29  Hepatic artery: 244  Splenic vein at pancreas: 21     Portal vein diameter: 1.2 cm.                                                                      IMPRESSION:   1. Cirrhotic liver morphology.  2. Patent hepatic vessels with appropriately directed flow.  3. Gallbladder sludge without sonographic evidence of cholecystitis.     5.  Calcification vas deferens compatible with diabetes.      Again, thank you for allowing me to participate in the care of your patient.        Sincerely,        Momo Walters PA-C

## 2022-08-22 NOTE — PATIENT INSTRUCTIONS
- Stop furosemide   - Start Bumex 2 mg twice daily  - Increase spironolactone 100 mg twice daily     - Continue lactulose (dose to 3-5 bowel movements)  - Continue Rifaximin twice daily     - Increase protein to  grams a day     Tonic water for cramps if needed    It was a pleasure to see you at your recent visit. Please let me know if you have any questions or concerns.     Clinic Staff - [phone] option 3     Sincerely,     KELSEA Lr  [address] [place] [address], Mail Code [id number]  [place]  [address]

## 2022-08-22 NOTE — PROGRESS NOTES
Hepatology Follow-up Clinic note  Dillon Salter   Date of Birth 1995  Date of Service 8/22/2022    Reason for follow-up: Cirrhosis          Assessment/plan:   Dillon Salter is a 26 year old male with history of EtOH cirrhosis, complicated by variceal bleed s/p banding, ascites controlled with diuretics and generalized anasarca,  hepatic encephalopathy. Significantly fluid overloaded today. He continues to have moderate liver dysfunction, total bilirubin of 20.7 which is primarily indirect suggesting likely hemolysis.  Last direct bilirubin month ago was 6.5. MELD-Na 25.  Last drank alcohol April 6th.      He has continued to require PRBC transfusions 1-2 times a week. We discussed importance of good nutrition as his liver recovers.     # Anasarca:   -Discontinue furosemide  -Start bumetanide 2 mg once daily.  -Increase sprionolactone 100 twice daily   -BMP in on week to see about increase in dose.     # Anemia:   -We will discuss decreasing threshold of transfusion to hemoglobin less than 6.5 with his hematologist    # Continue optimization of blood glucose     #Hepatic encephalopathy, controlled:   - Continue lactulose and dose to 3-5 bowel movements a day  - Continue rifaximin 550 mg twice daily    # Continue absolute sobriety from alcohol     # Follow-up with me in one month     Momo Walters PA-C   [facility] Hepatology clinic    Total time for E/M services performed on the date of the encounter 60 minutes.  This included review of previous: clinic visits, hospital records, lab results, imaging studies, and procedural documentation. Time also includes patient visit, documentation and discussion with other providers.  The findings from this review are summarized in the above note.   -----------------------------------------------------       HPI:   Dillon Salter is a 26 year old male presenting for follow-up. He is joined by his aunt and mother today.     Cirrhosis  Complicated by:  -  Ascites  - HE  - Hx of variceal bleeding  EGD: 6/18/2922, Mild PHG, Grade I EV  HCC: 5/24/2022    Patient was last seen by Dr. Gerardo/Dr. Wood on 5/5/2022.   Hospitalized in July for hyperkalemia and severe hyperglycemia. Hospitalized a few weeks ago for hypoglycemia and unresponsiveness after missing a lactulose dose. Was in the ER within the past week after a fall leaving infusion.     Lantus dose has been adjusted so blood glucose levels have been much better controlled. Having a lot of pain on the left side of body from recent fall.  Wonders what he can do for pain.     Getting PRBC transfuse 1-2x a week since April    Appetite is pretty good, eating fruits and vegetables.   Limit carbohydrates to 60 gm a meal and also states good compliance with 2000 mg sodium diet.     Currently weight 180 lbs. Having a lot of swelling in legs. Currently taking Furosemide 40 mg twice daily. Spironlactone 100 mg.     Taking lactulose 30 mL twice a day and having 3-5 bowel movements a day. Also Taking 550 mg Xifaximin . No recent problems with confusion.      Patient also denies melena, hematochezia or hematemesis. Patient denies  fevers, sweats or chills.    Patient stated that he had undergone alcohol-related treatment in 2018 at [place] for 24 days followed by 18 days in [place] for a total of 42 days of sobriety.  That was his longest period of sobriety.     Last drank alcohol April 6th. Has had ROBERT assessment, but has not been able to establish outpatient treatment due to frequent hospitalizations.          Medications:     Current Outpatient Medications   Medication     alcohol swab prep pads     blood glucose (NO BRAND SPECIFIED) test strip     blood glucose monitoring (NO BRAND SPECIFIED) meter device kit     fluconazole (DIFLUCAN) 150 MG tablet     folic acid (FOLVITE) 1 MG tablet     furosemide (LASIX) 40 MG tablet     insulin aspart (NOVOLOG FLEXPEN) 100 UNIT/ML pen     insulin glargine (LANTUS PEN) 100 UNIT/ML pen      insulin pen needle (ULTICARE MICRO) 32G X 4 MM miscellaneous     lactulose (CHRONULAC) 10 GM/15ML solution     magnesium oxide 400 MG CAPS     multivitamin, therapeutic (THERA-VIT) TABS tablet     pantoprazole (PROTONIX) 40 MG EC tablet     rifaximin (XIFAXAN) 550 MG TABS tablet     thiamine (B-1) 100 MG tablet     thin (NO BRAND SPECIFIED) lancets     oxyCODONE (ROXICODONE) 5 MG tablet     No current facility-administered medications for this visit.            Review of Systems:   10 points ROS was obtained and highlighted in the HPI, otherwise negative.          Physical Exam:   VS:  /53   Pulse 103   Temp 97.6  F (36.4  C)   Wt 84.9 kg (187 lb 3.2 oz)   SpO2 97%   BMI 30.21 kg/m        Gen: A&Ox3, drowsy, significant peripheral muscle wasting  HEENT: icteric sclera  CV: RRR, no overt murmurs  Lung: CTA Bilatererally, no wheezing or crackles.   Lym- no palpable lymphadenopathy  Abd: soft, NT, ND, pitting abdominal wall   Ext: intact pulses, moderate pitting pitting to upper legs  Skin: No rash, jaundiced  Neuro: grossly intact, mild asterixis (no lactulose today)  Psych: appropriate mood and affects         Data:   Reviewed in person and significant for:    Lab Results   Component Value Date     08/16/2022      Lab Results   Component Value Date    POTASSIUM 4.8 08/16/2022     Lab Results   Component Value Date    CHLORIDE 99 08/16/2022     Lab Results   Component Value Date    CO2 28 08/16/2022     Lab Results   Component Value Date    BUN 50 08/16/2022     Lab Results   Component Value Date    CR 0.78 08/16/2022       Lab Results   Component Value Date    WBC 15.5 08/18/2022     Lab Results   Component Value Date    HGB 6.6 08/18/2022     Lab Results   Component Value Date    HCT 19.2 08/18/2022     Lab Results   Component Value Date     08/18/2022     Lab Results   Component Value Date    PLT 96 08/18/2022       Lab Results   Component Value Date    AST 89 08/16/2022     Lab Results    Component Value Date    ALT 44 08/16/2022     No results found for: BILICONJ   Lab Results   Component Value Date    BILITOTAL 20.7 08/16/2022       Lab Results   Component Value Date    ALBUMIN 3.0 08/16/2022     Lab Results   Component Value Date    PROTTOTAL 7.4 08/16/2022      Lab Results   Component Value Date    ALKPHOS 197 08/16/2022       Lab Results   Component Value Date    INR 1.68 08/14/2022     EXAM: CT ABDOMEN PELVIS W CONTRAST  LOCATION: [facility]  DATE/TIME: 4/7/2022 12:37 AM     INDICATION: Abdominal distension.  COMPARISON: None.  TECHNIQUE: CT scan of the abdomen and pelvis was performed following injection of IV contrast. Multiplanar reformats were obtained. Dose reduction techniques were used.  CONTRAST: [id number] 80 ml.     FINDINGS:   LOWER CHEST: Multifocal rounded solid and groundglass nodular infiltrates with superimposed interstitial infiltrates in the visualized lungs.     HEPATOBILIARY: Hepatomegaly. Cirrhotic appearance to the liver with trace perihepatic ascites. Gallbladder is distended with wall thickening and sludge. Upper abdominal varices compatible with portal hypertension.     PANCREAS: Normal.     SPLEEN: Splenomegaly. Splenorenal shunt compatible with portal hypertension.     ADRENAL GLANDS: Normal.     KIDNEYS/BLADDER: Normal.     BOWEL: Normal.     LYMPH NODES: Normal.     VASCULATURE: Unremarkable.     PELVIC ORGANS: Calcification vas deferens compatible with diabetes. Anasarca.     MUSCULOSKELETAL: Normal.                                                                      IMPRESSION:   1.  Multifocal regions of solid, and groundglass nodularity in the visualized mid and lower lungs with superimposed background of mild interstitial infiltrates.     2.  Cirrhosis with splenomegaly and small volume ascites. Multiple varices compatible with portal hypertension.     3.  Gallbladder is distended with wall thickening which can be seen with  parenchymal disease. Sludge within the gallbladder lumen.     4.  Anasarca.      5/24/2022 10:50 AM     EXAM: US ABD HEPATIC and PORTAL VASCULATURE CMPL      INDICATION: abnormal liver tests, cirrhosis.     COMPARISON: Ultrasound 4/7/2022      TECHNIQUE: Gray-scale and color imaging of the right upper quadrant was performed.     FINDINGS:   PANCREAS: Normal where visualized, without focal mass identified. No pancreatic ductal dilatation is identified.     LIVER: The liver is coarsened in echogenicity with a peripheral nodular contour. Gallbladder sludge is noted biliary ductal dilatation.     OTHER: Small volume free fluid adjacent to the gallbladder.     Velocity (centimeters per second):  Main portal vein: 38  Left portal vein: 20  Right portal vein: 53  Left hepatic vein: 30  Middle hepatic vein: 19  Right hepatic vein: 29  Hepatic artery: 244  Splenic vein at pancreas: 21     Portal vein diameter: 1.2 cm.                                                                      IMPRESSION:   1. Cirrhotic liver morphology.  2. Patent hepatic vessels with appropriately directed flow.  3. Gallbladder sludge without sonographic evidence of cholecystitis.     5.  Calcification vas deferens compatible with diabetes.

## 2022-08-25 ENCOUNTER — INFUSION THERAPY VISIT (OUTPATIENT)
Dept: INFUSION THERAPY | Facility: CLINIC | Age: 27
End: 2022-08-25
Attending: INTERNAL MEDICINE
Payer: COMMERCIAL

## 2022-08-25 VITALS — HEART RATE: 97 BPM | SYSTOLIC BLOOD PRESSURE: 126 MMHG | DIASTOLIC BLOOD PRESSURE: 70 MMHG | TEMPERATURE: 97.7 F

## 2022-08-25 VITALS — TEMPERATURE: 98.3 F | DIASTOLIC BLOOD PRESSURE: 62 MMHG | HEART RATE: 98 BPM | SYSTOLIC BLOOD PRESSURE: 129 MMHG

## 2022-08-25 DIAGNOSIS — D62 ACUTE POSTHEMORRHAGIC ANEMIA: Primary | ICD-10-CM

## 2022-08-25 LAB
ABO/RH(D): NORMAL
ANTIBODY SCREEN: NEGATIVE
BASOPHILS # BLD AUTO: 0.1 10E3/UL (ref 0–0.2)
BASOPHILS NFR BLD AUTO: 1 %
BLD PROD TYP BPU: NORMAL
BLOOD COMPONENT TYPE: NORMAL
CODING SYSTEM: NORMAL
CROSSMATCH: NORMAL
EOSINOPHIL # BLD AUTO: 0.2 10E3/UL (ref 0–0.7)
EOSINOPHIL NFR BLD AUTO: 1 %
ERYTHROCYTE [DISTWIDTH] IN BLOOD BY AUTOMATED COUNT: 22.7 % (ref 10–15)
HCT VFR BLD AUTO: 16.2 % (ref 40–53)
HGB BLD-MCNC: 5.5 G/DL (ref 13.3–17.7)
IMM GRANULOCYTES # BLD: 0.5 10E3/UL
IMM GRANULOCYTES NFR BLD: 4 %
ISSUE DATE AND TIME: NORMAL
LYMPHOCYTES # BLD AUTO: 2.3 10E3/UL (ref 0.8–5.3)
LYMPHOCYTES NFR BLD AUTO: 17 %
MCH RBC QN AUTO: 35.3 PG (ref 26.5–33)
MCHC RBC AUTO-ENTMCNC: 34 G/DL (ref 31.5–36.5)
MCV RBC AUTO: 104 FL (ref 78–100)
MONOCYTES # BLD AUTO: 1 10E3/UL (ref 0–1.3)
MONOCYTES NFR BLD AUTO: 7 %
NEUTROPHILS # BLD AUTO: 9.3 10E3/UL (ref 1.6–8.3)
NEUTROPHILS NFR BLD AUTO: 70 %
NRBC # BLD AUTO: 0 10E3/UL
NRBC BLD AUTO-RTO: 0 /100
PLATELET # BLD AUTO: 109 10E3/UL (ref 150–450)
RBC # BLD AUTO: 1.56 10E6/UL (ref 4.4–5.9)
SPECIMEN EXPIRATION DATE: NORMAL
UNIT ABO/RH: NORMAL
UNIT NUMBER: NORMAL
UNIT STATUS: NORMAL
UNIT TYPE ISBT: 600
WBC # BLD AUTO: 13.4 10E3/UL (ref 4–11)

## 2022-08-25 PROCEDURE — 999N000127 HC STATISTIC PERIPHERAL IV START W US GUIDANCE

## 2022-08-25 PROCEDURE — 86923 COMPATIBILITY TEST ELECTRIC: CPT | Performed by: INTERNAL MEDICINE

## 2022-08-25 PROCEDURE — 86901 BLOOD TYPING SEROLOGIC RH(D): CPT | Performed by: INTERNAL MEDICINE

## 2022-08-25 PROCEDURE — 36415 COLL VENOUS BLD VENIPUNCTURE: CPT | Performed by: INTERNAL MEDICINE

## 2022-08-25 PROCEDURE — 36430 TRANSFUSION BLD/BLD COMPNT: CPT

## 2022-08-25 PROCEDURE — 85004 AUTOMATED DIFF WBC COUNT: CPT | Performed by: INTERNAL MEDICINE

## 2022-08-25 PROCEDURE — P9016 RBC LEUKOCYTES REDUCED: HCPCS | Performed by: INTERNAL MEDICINE

## 2022-08-25 RX ORDER — HEPARIN SODIUM (PORCINE) LOCK FLUSH IV SOLN 100 UNIT/ML 100 UNIT/ML
5 SOLUTION INTRAVENOUS
Status: CANCELLED | OUTPATIENT
Start: 2022-08-25

## 2022-08-25 RX ORDER — HEPARIN SODIUM,PORCINE 10 UNIT/ML
5 VIAL (ML) INTRAVENOUS
Status: CANCELLED | OUTPATIENT
Start: 2022-08-25

## 2022-08-25 NOTE — PROGRESS NOTES
Infusion Nursing Note:  Dillon Salter presents today for blood transfusoin.    Patient seen by provider today: No   present during visit today: Not Applicable.    Note: N/A.    Intravenous Access:  Peripheral IV placed by PICC    Treatment Conditions:  Lab Results   Component Value Date    HGB 5.5 (LL) 08/25/2022    WBC 13.4 (H) 08/25/2022    ANEU 11.3 (H) 08/18/2022    ANEUTAUTO 9.3 (H) 08/25/2022     (L) 08/25/2022      Results reviewed, labs MET treatment parameters, ok to proceed with treatment. He will RTC Monday for another recheck and possible blood      Post Infusion Assessment:  Patient tolerated infusion without incident.  Blood return noted pre and post infusion.  Access discontinued per protocol.     Discharge Plan:   Patient discharged in stable condition accompanied by: mother.      WALE LOPEZ RN

## 2022-08-29 ENCOUNTER — INFUSION THERAPY VISIT (OUTPATIENT)
Dept: INFUSION THERAPY | Facility: CLINIC | Age: 27
End: 2022-08-29
Attending: INTERNAL MEDICINE
Payer: COMMERCIAL

## 2022-08-29 VITALS
TEMPERATURE: 98.3 F | SYSTOLIC BLOOD PRESSURE: 137 MMHG | OXYGEN SATURATION: 95 % | HEART RATE: 93 BPM | DIASTOLIC BLOOD PRESSURE: 85 MMHG | RESPIRATION RATE: 16 BRPM

## 2022-08-29 DIAGNOSIS — D62 ACUTE POSTHEMORRHAGIC ANEMIA: Primary | ICD-10-CM

## 2022-08-29 LAB
ABO/RH(D): NORMAL
ALBUMIN SERPL-MCNC: 2.9 G/DL (ref 3.5–5)
ALP SERPL-CCNC: 158 U/L (ref 45–120)
ALT SERPL W P-5'-P-CCNC: 44 U/L (ref 0–45)
ANION GAP SERPL CALCULATED.3IONS-SCNC: 7 MMOL/L (ref 5–18)
ANTIBODY SCREEN: NEGATIVE
AST SERPL W P-5'-P-CCNC: 92 U/L (ref 0–40)
BASOPHILS # BLD MANUAL: 0.1 10E3/UL (ref 0–0.2)
BASOPHILS NFR BLD MANUAL: 1 %
BILIRUB SERPL-MCNC: 16.9 MG/DL (ref 0–1)
BLD PROD TYP BPU: NORMAL
BLOOD COMPONENT TYPE: NORMAL
BUN SERPL-MCNC: 38 MG/DL (ref 8–22)
BURR CELLS BLD QL SMEAR: SLIGHT
CALCIUM SERPL-MCNC: 9.5 MG/DL (ref 8.5–10.5)
CHLORIDE BLD-SCNC: 92 MMOL/L (ref 98–107)
CO2 SERPL-SCNC: 33 MMOL/L (ref 22–31)
CODING SYSTEM: NORMAL
CREAT SERPL-MCNC: 0.88 MG/DL (ref 0.7–1.3)
CROSSMATCH: NORMAL
EOSINOPHIL # BLD MANUAL: 0.1 10E3/UL (ref 0–0.7)
EOSINOPHIL NFR BLD MANUAL: 1 %
ERYTHROCYTE [DISTWIDTH] IN BLOOD BY AUTOMATED COUNT: 23.2 % (ref 10–15)
FRAGMENTS BLD QL SMEAR: SLIGHT
GFR SERPL CREATININE-BSD FRML MDRD: >90 ML/MIN/1.73M2
GLUCOSE BLD-MCNC: 152 MG/DL (ref 70–125)
HCT VFR BLD AUTO: 17.7 % (ref 40–53)
HGB BLD-MCNC: 5.9 G/DL (ref 13.3–17.7)
ISSUE DATE AND TIME: NORMAL
LYMPHOCYTES # BLD MANUAL: 3 10E3/UL (ref 0.8–5.3)
LYMPHOCYTES NFR BLD MANUAL: 24 %
MCH RBC QN AUTO: 34.9 PG (ref 26.5–33)
MCHC RBC AUTO-ENTMCNC: 33.3 G/DL (ref 31.5–36.5)
MCV RBC AUTO: 105 FL (ref 78–100)
MONOCYTES # BLD MANUAL: 0.9 10E3/UL (ref 0–1.3)
MONOCYTES NFR BLD MANUAL: 7 %
MYELOCYTES # BLD MANUAL: 0.1 10E3/UL
MYELOCYTES NFR BLD MANUAL: 1 %
NEUTROPHILS # BLD MANUAL: 8.3 10E3/UL (ref 1.6–8.3)
NEUTROPHILS NFR BLD MANUAL: 66 %
PLAT MORPH BLD: ABNORMAL
PLATELET # BLD AUTO: 106 10E3/UL (ref 150–450)
POTASSIUM BLD-SCNC: 5.7 MMOL/L (ref 3.5–5)
PROT SERPL-MCNC: 7.6 G/DL (ref 6–8)
RBC # BLD AUTO: 1.69 10E6/UL (ref 4.4–5.9)
RBC MORPH BLD: ABNORMAL
SODIUM SERPL-SCNC: 132 MMOL/L (ref 136–145)
SPECIMEN EXPIRATION DATE: NORMAL
UNIT ABO/RH: NORMAL
UNIT NUMBER: NORMAL
UNIT STATUS: NORMAL
UNIT TYPE ISBT: 600
WBC # BLD AUTO: 12.5 10E3/UL (ref 4–11)

## 2022-08-29 PROCEDURE — 36415 COLL VENOUS BLD VENIPUNCTURE: CPT | Performed by: PHYSICIAN ASSISTANT

## 2022-08-29 PROCEDURE — 86850 RBC ANTIBODY SCREEN: CPT | Performed by: INTERNAL MEDICINE

## 2022-08-29 PROCEDURE — 80053 COMPREHEN METABOLIC PANEL: CPT | Performed by: PHYSICIAN ASSISTANT

## 2022-08-29 PROCEDURE — 86923 COMPATIBILITY TEST ELECTRIC: CPT | Performed by: INTERNAL MEDICINE

## 2022-08-29 PROCEDURE — 999N000127 HC STATISTIC PERIPHERAL IV START W US GUIDANCE

## 2022-08-29 PROCEDURE — 85027 COMPLETE CBC AUTOMATED: CPT | Performed by: INTERNAL MEDICINE

## 2022-08-29 PROCEDURE — P9016 RBC LEUKOCYTES REDUCED: HCPCS | Performed by: INTERNAL MEDICINE

## 2022-08-29 PROCEDURE — 36430 TRANSFUSION BLD/BLD COMPNT: CPT

## 2022-08-29 PROCEDURE — 999N000285 HC STATISTIC VASC ACCESS LAB DRAW WITH PIV START

## 2022-08-29 PROCEDURE — 85007 BL SMEAR W/DIFF WBC COUNT: CPT | Performed by: INTERNAL MEDICINE

## 2022-08-29 RX ORDER — HEPARIN SODIUM (PORCINE) LOCK FLUSH IV SOLN 100 UNIT/ML 100 UNIT/ML
5 SOLUTION INTRAVENOUS
Status: CANCELLED | OUTPATIENT
Start: 2022-08-29

## 2022-08-29 RX ORDER — HEPARIN SODIUM,PORCINE 10 UNIT/ML
5 VIAL (ML) INTRAVENOUS
Status: CANCELLED | OUTPATIENT
Start: 2022-08-29

## 2022-08-29 NOTE — PROGRESS NOTES
Infusion Nursing Note:  Dillon Salter presents today for 1 unit PRBC.    Patient seen by provider today: No   present during visit today: Not Applicable.    Note: Pt arrives via wheelchair and with Mom present. Denies any fever, chills, cough, sob, dizziness, bleeding or any other new concerns or complaints. Mom does report blisters on top of pt's bilateral feet that are very difficult to heal. Encouraged Mom to call pt's PCP to set up an appointment for asap and if she couldn't get in soon that she needs to go to Urgent care if they look infected. Mom verbalized an understanding.     Intravenous Access:  Peripheral IV placed by PICC.     Treatment Conditions:  Lab Results   Component Value Date    HGB 5.9 (LL) 08/29/2022    WBC 12.5 (H) 08/29/2022    ANEU 8.3 08/29/2022    ANEUTAUTO 9.3 (H) 08/25/2022     (L) 08/29/2022      Results reviewed, labs MET treatment parameters, ok to proceed with treatment.  Blood transfusion consent signed 6/27/22.    Post Infusion Assessment:  Patient tolerated infusion without incident.  Blood return noted pre and post infusion.  Site patent and intact, free from redness, edema or discomfort.  No evidence of extravasations.  Access discontinued per protocol.     Discharge Plan:   Patient will return 9/1 for next appointment.   Patient discharged in stable condition accompanied by: self and mother.  Departure Mode: Wheelchair.      Zayda Walker RN

## 2022-08-31 ENCOUNTER — TELEPHONE (OUTPATIENT)
Dept: FAMILY MEDICINE | Facility: CLINIC | Age: 27
End: 2022-08-31

## 2022-08-31 NOTE — TELEPHONE ENCOUNTER
MTM referral from: [facility] visit (referral by provider)    MTM referral outreach attempt #2 on August 31, 2022 at 9:57 AM      Outcome: Patient not reachable after several attempts, will route to [place] Pharmacist/Provider as an FYI.  [facility] scheduling number is [phone].  Thank you for the referral.    Tali Maldonado [facility]

## 2022-09-01 ENCOUNTER — PATIENT OUTREACH (OUTPATIENT)
Dept: ONCOLOGY | Facility: CLINIC | Age: 27
End: 2022-09-01

## 2022-09-01 ENCOUNTER — INFUSION THERAPY VISIT (OUTPATIENT)
Dept: INFUSION THERAPY | Facility: CLINIC | Age: 27
End: 2022-09-01
Attending: INTERNAL MEDICINE
Payer: COMMERCIAL

## 2022-09-01 ENCOUNTER — ONCOLOGY VISIT (OUTPATIENT)
Dept: ONCOLOGY | Facility: CLINIC | Age: 27
End: 2022-09-01
Attending: INTERNAL MEDICINE
Payer: COMMERCIAL

## 2022-09-01 VITALS
SYSTOLIC BLOOD PRESSURE: 148 MMHG | OXYGEN SATURATION: 99 % | HEIGHT: 66 IN | BODY MASS INDEX: 21.45 KG/M2 | WEIGHT: 133.5 LBS | DIASTOLIC BLOOD PRESSURE: 79 MMHG | TEMPERATURE: 98.7 F | HEART RATE: 97 BPM

## 2022-09-01 DIAGNOSIS — D62 ACUTE POSTHEMORRHAGIC ANEMIA: ICD-10-CM

## 2022-09-01 DIAGNOSIS — L08.9 LOCAL INFECTION OF SKIN AND SUBCUTANEOUS TISSUE: ICD-10-CM

## 2022-09-01 DIAGNOSIS — D64.9 ANEMIA: Primary | ICD-10-CM

## 2022-09-01 DIAGNOSIS — D62 ACUTE POSTHEMORRHAGIC ANEMIA: Primary | ICD-10-CM

## 2022-09-01 DIAGNOSIS — E87.5 HYPERKALEMIA: Primary | ICD-10-CM

## 2022-09-01 DIAGNOSIS — D63.8 ANEMIA OF CHRONIC ILLNESS: ICD-10-CM

## 2022-09-01 LAB
ABO/RH(D): NORMAL
ALBUMIN SERPL-MCNC: 3.1 G/DL (ref 3.5–5)
ALP SERPL-CCNC: 166 U/L (ref 45–120)
ALT SERPL W P-5'-P-CCNC: 43 U/L (ref 0–45)
ANION GAP SERPL CALCULATED.3IONS-SCNC: 7 MMOL/L (ref 5–18)
ANTIBODY SCREEN: NEGATIVE
AST SERPL W P-5'-P-CCNC: 90 U/L (ref 0–40)
BASOPHILS # BLD AUTO: 0.2 10E3/UL (ref 0–0.2)
BASOPHILS NFR BLD AUTO: 1 %
BILIRUB SERPL-MCNC: 18.4 MG/DL (ref 0–1)
BUN SERPL-MCNC: 40 MG/DL (ref 8–22)
CALCIUM SERPL-MCNC: 9.1 MG/DL (ref 8.5–10.5)
CHLORIDE BLD-SCNC: 88 MMOL/L (ref 98–107)
CO2 SERPL-SCNC: 30 MMOL/L (ref 22–31)
CREAT SERPL-MCNC: 0.97 MG/DL (ref 0.7–1.3)
EOSINOPHIL # BLD AUTO: 0.2 10E3/UL (ref 0–0.7)
EOSINOPHIL NFR BLD AUTO: 2 %
ERYTHROCYTE [DISTWIDTH] IN BLOOD BY AUTOMATED COUNT: 21.2 % (ref 10–15)
GFR SERPL CREATININE-BSD FRML MDRD: >90 ML/MIN/1.73M2
GLUCOSE BLD-MCNC: 276 MG/DL (ref 70–125)
HCT VFR BLD AUTO: 19.2 % (ref 40–53)
HGB BLD-MCNC: 6.8 G/DL (ref 13.3–17.7)
IMM GRANULOCYTES # BLD: 0.2 10E3/UL
IMM GRANULOCYTES NFR BLD: 1 %
LYMPHOCYTES # BLD AUTO: 2 10E3/UL (ref 0.8–5.3)
LYMPHOCYTES NFR BLD AUTO: 16 %
MCH RBC QN AUTO: 35.6 PG (ref 26.5–33)
MCHC RBC AUTO-ENTMCNC: 35.4 G/DL (ref 31.5–36.5)
MCV RBC AUTO: 101 FL (ref 78–100)
MONOCYTES # BLD AUTO: 0.6 10E3/UL (ref 0–1.3)
MONOCYTES NFR BLD AUTO: 5 %
NEUTROPHILS # BLD AUTO: 9.4 10E3/UL (ref 1.6–8.3)
NEUTROPHILS NFR BLD AUTO: 75 %
NRBC # BLD AUTO: 0 10E3/UL
NRBC BLD AUTO-RTO: 0 /100
PLATELET # BLD AUTO: 100 10E3/UL (ref 150–450)
POTASSIUM BLD-SCNC: 4.8 MMOL/L (ref 3.5–5)
PROT SERPL-MCNC: 8.5 G/DL (ref 6–8)
RBC # BLD AUTO: 1.91 10E6/UL (ref 4.4–5.9)
SODIUM SERPL-SCNC: 125 MMOL/L (ref 136–145)
SPECIMEN EXPIRATION DATE: NORMAL
WBC # BLD AUTO: 12.5 10E3/UL (ref 4–11)

## 2022-09-01 PROCEDURE — 99215 OFFICE O/P EST HI 40 MIN: CPT | Performed by: INTERNAL MEDICINE

## 2022-09-01 PROCEDURE — 86901 BLOOD TYPING SEROLOGIC RH(D): CPT | Performed by: INTERNAL MEDICINE

## 2022-09-01 PROCEDURE — 36415 COLL VENOUS BLD VENIPUNCTURE: CPT | Performed by: INTERNAL MEDICINE

## 2022-09-01 PROCEDURE — 85041 AUTOMATED RBC COUNT: CPT | Performed by: INTERNAL MEDICINE

## 2022-09-01 PROCEDURE — G0463 HOSPITAL OUTPT CLINIC VISIT: HCPCS

## 2022-09-01 PROCEDURE — G0463 HOSPITAL OUTPT CLINIC VISIT: HCPCS | Mod: 25

## 2022-09-01 PROCEDURE — 80053 COMPREHEN METABOLIC PANEL: CPT | Performed by: INTERNAL MEDICINE

## 2022-09-01 PROCEDURE — 82040 ASSAY OF SERUM ALBUMIN: CPT | Performed by: INTERNAL MEDICINE

## 2022-09-01 RX ORDER — HEPARIN SODIUM,PORCINE 10 UNIT/ML
5 VIAL (ML) INTRAVENOUS
Status: CANCELLED | OUTPATIENT
Start: 2022-09-01

## 2022-09-01 RX ORDER — HEPARIN SODIUM (PORCINE) LOCK FLUSH IV SOLN 100 UNIT/ML 100 UNIT/ML
5 SOLUTION INTRAVENOUS
Status: CANCELLED | OUTPATIENT
Start: 2022-09-01

## 2022-09-01 RX ORDER — CEPHALEXIN 500 MG/1
500 CAPSULE ORAL 2 TIMES DAILY
Qty: 20 CAPSULE | Refills: 0 | Status: SHIPPED | OUTPATIENT
Start: 2022-09-01 | End: 2022-10-06

## 2022-09-01 RX ORDER — GABAPENTIN 100 MG/1
100 CAPSULE ORAL DAILY PRN
Status: ON HOLD | COMMUNITY
Start: 2022-08-30 | End: 2023-02-17

## 2022-09-01 RX ORDER — HYDROXYZINE HYDROCHLORIDE 25 MG/1
25 TABLET, FILM COATED ORAL
COMMUNITY
Start: 2022-08-09 | End: 2023-01-21

## 2022-09-01 ASSESSMENT — PAIN SCALES - GENERAL: PAINLEVEL: NO PAIN (0)

## 2022-09-01 NOTE — PROGRESS NOTES
"Oncology Rooming Note    September 1, 2022 9:06 AM   Dillon Salter is a 26 year old male who presents for:    Chief Complaint   Patient presents with     Hematology     Anemia, unspecified type,  Anemia in other chronic diseases classified elsewhere,  Benign essential hypertension,  Alcoholic cirrhosis of liver with ascites     Initial Vitals: BP (!) 148/79 (BP Location: Right arm)   Pulse 97   Temp 98.7  F (37.1  C)   Ht 1.676 m (5' 6\")   Wt 60.6 kg (133 lb 8 oz)   SpO2 99%   BMI 21.55 kg/m   Estimated body mass index is 21.55 kg/m  as calculated from the following:    Height as of this encounter: 1.676 m (5' 6\").    Weight as of this encounter: 60.6 kg (133 lb 8 oz). Body surface area is 1.68 meters squared.  No Pain (0) Comment: Data Unavailable   No LMP for male patient.  Allergies reviewed: Yes  Medications reviewed: Yes    Medications: Medication refills not needed today.  Pharmacy name entered into [facility]: [id number] [id number] AT [facility]    Clinical concerns: 1 month follow up with labs and possible blood    Kelsea Yang MA            "

## 2022-09-01 NOTE — LETTER
9/1/2022         RE: Dillon Salter  [address] [facility] [address]        Dear Colleague,    Thank you for referring your patient, Dillon Salter, to the [facility] [place]. Please see a copy of my visit note below.    [facility] Hematology and Oncology Progress Note    Patient: Dillon Salter  MRN: [id number]  Date of Service: Sep 1, 2022         Reason for Visit    Chief Complaint   Patient presents with     Hematology     Anemia, unspecified type,  Anemia in other chronic diseases classified elsewhere,  Benign essential hypertension,  Alcoholic cirrhosis of liver with ascites       Assessment and Plan    Cancer Staging  No matching staging information was found for the patient.    ECOG Performance    2 - Ambulatory and independent in all ADLs; cannot work; up > 50% of the time     Pain  Pain Score: No Pain (0)      #.  Recurrent severe anemia on chronic anemia, initially acute blood loss secondary to variceal bleeding followed by mucosal oozing with friable gastric mucosa.  #.  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  Mild to moderate thrombocytopenia, secondary to liver disease  #.  Cirrhosis of the liver with portal hypertension, secondary to alcohol use  #.  Hepatic encephalopathy  #.  Autism     Reviewed labs.  His hemoglobin was 6.8 g/dl.  He has a mild leukocytosis.  Platelet count has slightly improved to 100 and stable around that range.    Gastroenterology team suggesting blood transfusion for hemoglobin less than 6.5 g/dL.  They agree with that.    Hold off on bone marrow biopsy at this point.    Continue transfusion support for hemoglobin less than 6.5 g/dL.  We will continue with conservative transfusion support and hopefully to limit blood transfusion no more than 1 time a week.  Recommended labs once a week and transfusion once a week as needed and additional lab based on symptoms.    Follow-up provider visit 4 weeks.    #.   Superficial skin infection of the right foot secondary to blister formation   Continue wound care with topical antibiotic.  I prescribed Keflex for 10 days.    #.  Hyperkalemia   Recheck potassium level today and it is normal.  Continue diuretics as guided per gastroenterology and follow-up for further direction.    Encounter Diagnoses:    Problem List Items Addressed This Visit     Anemia of chronic illness    Acute posthemorrhagic anemia    Hyperkalemia - Primary    Relevant Orders    Comprehensive metabolic panel (Completed)      Other Visit Diagnoses     Local infection of skin and subcutaneous tissue        Relevant Medications    cephALEXin (KEFLEX) 500 MG capsule             CC: Gary Rodrigues MD   ______________________________________________________________________________    History of Present Illness    Mr. Dillon Salter presented today accompanied by his mother.    He was hospitalized twice since last visit with me a month ago secondary to hepatic encephalopathy and falls.  He had an appointment with GI at [facility] and diuretics were adjusted and now he is on Bumex.  He noted significant fluid removal by Bumex.  Does not have bleeding.  He is so far out that he is nearly 5 months out from his last alcohol use.    He reported skin blister on both feet, worse on the right side. He is doing wound dressing with hydrogen peroxide and topical antibiotic.  Mom is worried about his potassium which was quite elevated a couple days ago.    Review of systems  Apart from describing in HPI, the remainder of comprehensive ROS was negative.    Past History    Past Medical History:   Diagnosis Date     Diabetes (H)        Past Surgical History:   Procedure Laterality Date     ESOPHAGOSCOPY, GASTROSCOPY, DUODENOSCOPY (EGD), COMBINED N/A 5/24/2022    Procedure: ESOPHAGOGASTRODUODENOSCOPY (EGD) with esophageal banding;  Surgeon: Gary Hurst MD;  Location: [facility] OR     ESOPHAGOSCOPY, GASTROSCOPY,  "DUODENOSCOPY (EGD), COMBINED N/A 6/20/2022    Procedure: ESOPHAGOGASTRODUODENOSCOPY;  Surgeon: Gavino Reza MD;  Location: [facility] Main OR     MIDLINE DOUBLE LUMEN PLACEMENT  5/26/2022          PICC TRIPLE LUMEN PLACEMENT  7/28/2022            Physical Exam    BP (!) 148/79 (BP Location: Right arm)   Pulse 97   Temp 98.7  F (37.1  C)   Ht 1.676 m (5' 6\")   Wt 60.6 kg (133 lb 8 oz)   SpO2 99%   BMI 21.55 kg/m      General: alert, awake, not in acute distress, but looks tired.  HEENT: Head: Normal, normocephalic, atraumatic.  Eye: Icterus sclera.  Pharynx: Normal buccal mucosa. Normal pharynx.  Neck / Thyroid: Supple, no masses, nodes, nodules or enlargement.  Lymphatics: No abnormally enlarged lymph nodes.  Heart: S1 S2 RRR.   Abdomen: abdomen is soft without significant tenderness, masses, organomegaly or guarding  Extremities: normal strength, tone, and muscle mass  Skin:   CNS: non focal.    Lab Results    Recent Results (from the past 168 hour(s))   Prepare red blood cells (unit)   Result Value Ref Range    Blood Component Type Red Blood Cells     Product Code Q7562U06     Unit Status Transfused     Unit Number [id number]     CROSSMATCH Compatible     CODING SYSTEM [id number]     ISSUE DATE AND TIME [id number]     UNIT ABO/RH A-     UNIT TYPE ISBT 0600    Comprehensive metabolic panel   Result Value Ref Range    Sodium 132 (L) 136 - 145 mmol/L    Potassium 5.7 (H) 3.5 - 5.0 mmol/L    Chloride 92 (L) 98 - 107 mmol/L    Carbon Dioxide (CO2) 33 (H) 22 - 31 mmol/L    Anion Gap 7 5 - 18 mmol/L    Urea Nitrogen 38 (H) 8 - 22 mg/dL    Creatinine 0.88 0.70 - 1.30 mg/dL    Calcium 9.5 8.5 - 10.5 mg/dL    Glucose 152 (H) 70 - 125 mg/dL    Alkaline Phosphatase 158 (H) 45 - 120 U/L    AST 92 (H) 0 - 40 U/L    ALT 44 0 - 45 U/L    Protein Total 7.6 6.0 - 8.0 g/dL    Albumin 2.9 (L) 3.5 - 5.0 g/dL    Bilirubin Total 16.9 (HH) 0.0 - 1.0 mg/dL    GFR Estimate >90 >60 mL/min/1.73m2   Adult Type and " Screen   Result Value Ref Range    ABO/RH(D) A NEG     Antibody Screen Negative Negative    SPECIMEN EXPIRATION DATE [id number]    CBC with platelets and differential   Result Value Ref Range    WBC Count 12.5 (H) 4.0 - 11.0 10e3/uL    RBC Count 1.69 (L) 4.40 - 5.90 10e6/uL    Hemoglobin 5.9 (LL) 13.3 - 17.7 g/dL    Hematocrit 17.7 (L) 40.0 - 53.0 %     (H) 78 - 100 fL    MCH 34.9 (H) 26.5 - 33.0 pg    MCHC 33.3 31.5 - 36.5 g/dL    RDW 23.2 (H) 10.0 - 15.0 %    Platelet Count 106 (L) 150 - 450 10e3/uL   Manual Differential   Result Value Ref Range    % Neutrophils 66 %    % Lymphocytes 24 %    % Monocytes 7 %    % Eosinophils 1 %    % Basophils 1 %    % Myelocytes 1 %    Absolute Neutrophils 8.3 1.6 - 8.3 10e3/uL    Absolute Lymphocytes 3.0 0.8 - 5.3 10e3/uL    Absolute Monocytes 0.9 0.0 - 1.3 10e3/uL    Absolute Eosinophils 0.1 0.0 - 0.7 10e3/uL    Absolute Basophils 0.1 0.0 - 0.2 10e3/uL    Absolute Myelocytes 0.1 (H) <=0.0 10e3/uL    RBC Morphology Confirmed RBC Indices     Platelet Assessment  Automated Count Confirmed. Platelet morphology is normal.     Automated Count Confirmed. Platelet morphology is normal.    Morenci Cells Slight (A) None Seen    RBC Fragments Slight (A) None Seen   CBC with platelets and differential   Result Value Ref Range    WBC Count 12.5 (H) 4.0 - 11.0 10e3/uL    RBC Count 1.91 (L) 4.40 - 5.90 10e6/uL    Hemoglobin 6.8 (LL) 13.3 - 17.7 g/dL    Hematocrit 19.2 (L) 40.0 - 53.0 %     (H) 78 - 100 fL    MCH 35.6 (H) 26.5 - 33.0 pg    MCHC 35.4 31.5 - 36.5 g/dL    RDW 21.2 (H) 10.0 - 15.0 %    Platelet Count 100 (L) 150 - 450 10e3/uL    % Neutrophils 75 %    % Lymphocytes 16 %    % Monocytes 5 %    % Eosinophils 2 %    % Basophils 1 %    % Immature Granulocytes 1 %    NRBCs per 100 WBC 0 <1 /100    Absolute Neutrophils 9.4 (H) 1.6 - 8.3 10e3/uL    Absolute Lymphocytes 2.0 0.8 - 5.3 10e3/uL    Absolute Monocytes 0.6 0.0 - 1.3 10e3/uL    Absolute Eosinophils 0.2 0.0 - 0.7  10e3/uL    Absolute Basophils 0.2 0.0 - 0.2 10e3/uL    Absolute Immature Granulocytes 0.2 <=0.4 10e3/uL    Absolute NRBCs 0.0 10e3/uL   Adult Type and Screen   Result Value Ref Range    ABO/RH(D) A NEG     Antibody Screen Negative Negative    SPECIMEN EXPIRATION DATE [id number]    Comprehensive metabolic panel   Result Value Ref Range    Sodium 125 (L) 136 - 145 mmol/L    Potassium 4.8 3.5 - 5.0 mmol/L    Chloride 88 (L) 98 - 107 mmol/L    Carbon Dioxide (CO2) 30 22 - 31 mmol/L    Anion Gap 7 5 - 18 mmol/L    Urea Nitrogen 40 (H) 8 - 22 mg/dL    Creatinine 0.97 0.70 - 1.30 mg/dL    Calcium 9.1 8.5 - 10.5 mg/dL    Glucose 276 (H) 70 - 125 mg/dL    Alkaline Phosphatase 166 (H) 45 - 120 U/L    AST 90 (H) 0 - 40 U/L    ALT 43 0 - 45 U/L    Protein Total 8.5 (H) 6.0 - 8.0 g/dL    Albumin 3.1 (L) 3.5 - 5.0 g/dL    Bilirubin Total 18.4 (HH) 0.0 - 1.0 mg/dL    GFR Estimate >90 >60 mL/min/1.73m2       Imaging    XR Chest 1 View    Result Date: 8/14/2022  EXAM: XR CHEST 1 VIEW LOCATION: [facility] DATE/TIME: 8/14/2022 5:57 PM INDICATION: sob COMPARISON: 06/18/2022     IMPRESSION: Low lung volumes with crowding the pulmonary vasculature. Patchy perihilar opacity and central peribronchial cuffing please correlate clinically for CHF. No pneumothorax. No pleural effusion. Mild cardiomegaly.    US Abdomen Limited    Result Date: 8/16/2022  EXAM: US ABDOMEN LIMITED LOCATION: [facility] DATE/TIME: 8/16/2022 11:52 AM INDICATION: HIGH VOLUME paracentesis with or without diagnostic fluid analysis with labs to be drawn if ordered. Total paracentesis volume as much as possible. COMPARISON: CT 04/07/2022 TECHNIQUE: Limited abdominal ultrasound. FINDINGS AND     IMPRESSION: No significant fluid. Safe paracentesis could not be performed and was deferred.    CT Head w/o Contrast    Result Date: 8/18/2022  EXAM: CT HEAD W/O CONTRAST LOCATION: [facility]  DATE/TIME: 8/18/2022 3:17 PM INDICATION: Head injury. COMPARISON: None. TECHNIQUE: Routine CT Head without IV contrast. Multiplanar reformats. Dose reduction techniques were used. FINDINGS: INTRACRANIAL CONTENTS: No intracranial hemorrhage, extraaxial collection, or mass effect.  No CT evidence of acute infarct. Normal parenchymal attenuation. Normal ventricles and sulci. VISUALIZED ORBITS/SINUSES/MASTOIDS: No intraorbital abnormality. No paranasal sinus mucosal disease. No middle ear or mastoid effusion. BONES/SOFT TISSUES: Left frontal scalp laceration. No acute calvarial abnormality.     IMPRESSION: 1.  No acute intracranial process. 2.  Left frontal scalp laceration.    CT Head w/o Contrast    Result Date: 8/14/2022  EXAM: CT HEAD W/O CONTRAST LOCATION: [facility] DATE/TIME: 8/14/2022 6:04 PM INDICATION: Altered mental status, found unresponsive COMPARISON: None. TECHNIQUE: Routine CT Head without IV contrast. Multiplanar reformats. Dose reduction techniques were used. FINDINGS: INTRACRANIAL CONTENTS: No intracranial hemorrhage, extraaxial collection, or mass effect.  No CT evidence of acute infarct. Normal parenchymal attenuation. Normal ventricles and sulci. VISUALIZED ORBITS/SINUSES/MASTOIDS: No intraorbital abnormality. No paranasal sinus mucosal disease. No middle ear or mastoid effusion. BONES/SOFT TISSUES: No acute abnormality.     IMPRESSION: 1.  No acute intracranial abnormality.    40 minutes spent on the date of the encounter doing chart review, history and exam, documentation and further activities as noted above.    Signed by: Tez Riojas MD    Oncology Rooming Note    September 1, 2022 9:06 AM   Dillon Salter is a 26 year old male who presents for:    Chief Complaint   Patient presents with     Hematology     Anemia, unspecified type,  Anemia in other chronic diseases classified elsewhere,  Benign essential hypertension,  Alcoholic cirrhosis of liver with ascites  "    Initial Vitals: BP (!) 148/79 (BP Location: Right arm)   Pulse 97   Temp 98.7  F (37.1  C)   Ht 1.676 m (5' 6\")   Wt 60.6 kg (133 lb 8 oz)   SpO2 99%   BMI 21.55 kg/m   Estimated body mass index is 21.55 kg/m  as calculated from the following:    Height as of this encounter: 1.676 m (5' 6\").    Weight as of this encounter: 60.6 kg (133 lb 8 oz). Body surface area is 1.68 meters squared.  No Pain (0) Comment: Data Unavailable   No LMP for male patient.  Allergies reviewed: Yes  Medications reviewed: Yes    Medications: Medication refills not needed today.  Pharmacy name entered into Phase III Development: [id number] [address] [facility]  [facility]    Clinical concerns: 1 month follow up with labs and possible blood    Kelsea Yang MA                Again, thank you for allowing me to participate in the care of your patient.        Sincerely,        Tez Riojas MD    "

## 2022-09-01 NOTE — PROGRESS NOTES
DATE:  9/1/2022   TIME OF RECEIPT FROM LAB: 1049  LAB TEST:  Bilirubin  LAB VALUE:  18.4  RESULTS GIVEN WITH READ-BACK TO (PROVIDER):  Dr. Riojas notified of lab results and also potassium came down from 5.7 to 4.8  TIME LAB VALUE REPORTED TO PROVIDER:   6069

## 2022-09-01 NOTE — PROGRESS NOTES
[facility] Progress Note    Patient: Dillon Salter  MRN: [id number]  Date of Service: Sep 1, 2022         Reason for Visit    Chief Complaint   Patient presents with     Hematology     Anemia, unspecified type,  Anemia in other chronic diseases classified elsewhere,  Benign essential hypertension,  Alcoholic cirrhosis of liver with ascites       Assessment and Plan    Cancer Staging  No matching staging information was found for the patient.    ECOG Performance    2 - Ambulatory and independent in all ADLs; cannot work; up > 50% of the time     Pain  Pain Score: No Pain (0)      #.  Recurrent severe anemia on chronic anemia, initially acute blood loss secondary to variceal bleeding followed by mucosal oozing with friable gastric mucosa.  #.  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  Mild to moderate thrombocytopenia, secondary to liver disease  #.  Cirrhosis of the liver with portal hypertension, secondary to alcohol use  #.  Hepatic encephalopathy  #.  Autism     Reviewed labs.  His hemoglobin was 6.8 g/dl.  He has a mild leukocytosis.  Platelet count has slightly improved to 100 and stable around that range.    Gastroenterology team suggesting blood transfusion for hemoglobin less than 6.5 g/dL.  They agree with that.    Hold off on bone marrow biopsy at this point.    Continue transfusion support for hemoglobin less than 6.5 g/dL.  We will continue with conservative transfusion support and hopefully to limit blood transfusion no more than 1 time a week.  Recommended labs once a week and transfusion once a week as needed and additional lab based on symptoms.    Follow-up provider visit 4 weeks.    #.  Superficial skin infection of the right foot secondary to blister formation   Continue wound care with topical antibiotic.  I prescribed Keflex for 10 days.    #.  Hyperkalemia   Recheck potassium level today and it is normal.  Continue diuretics as guided per  gastroenterology and follow-up for further direction.    Encounter Diagnoses:    Problem List Items Addressed This Visit     Anemia of chronic illness    Acute posthemorrhagic anemia    Hyperkalemia - Primary    Relevant Orders    Comprehensive metabolic panel (Completed)      Other Visit Diagnoses     Local infection of skin and subcutaneous tissue        Relevant Medications    cephALEXin (KEFLEX) 500 MG capsule             CC: Gary Rodrigues MD   ______________________________________________________________________________    History of Present Illness    Mr. Dillon Salter presented today accompanied by his mother.    He was hospitalized twice since last visit with me a month ago secondary to hepatic encephalopathy and falls.  He had an appointment with GI at [facility] and diuretics were adjusted and now he is on Bumex.  He noted significant fluid removal by Bumex.  Does not have bleeding.  He is so far out that he is nearly 5 months out from his last alcohol use.    He reported skin blister on both feet, worse on the right side. He is doing wound dressing with hydrogen peroxide and topical antibiotic.  Mom is worried about his potassium which was quite elevated a couple days ago.    Review of systems  Apart from describing in HPI, the remainder of comprehensive ROS was negative.    Past History    Past Medical History:   Diagnosis Date     Diabetes (H)        Past Surgical History:   Procedure Laterality Date     ESOPHAGOSCOPY, GASTROSCOPY, DUODENOSCOPY (EGD), COMBINED N/A 5/24/2022    Procedure: ESOPHAGOGASTRODUODENOSCOPY (EGD) with esophageal banding;  Surgeon: Gary Hurst MD;  Location: [facility] OR     ESOPHAGOSCOPY, GASTROSCOPY, DUODENOSCOPY (EGD), COMBINED N/A 6/20/2022    Procedure: ESOPHAGOGASTRODUODENOSCOPY;  Surgeon: Gavino Reza MD;  Location: [facility] OR     MIDLINE DOUBLE LUMEN PLACEMENT  5/26/2022          PICC TRIPLE LUMEN PLACEMENT  7/28/2022       "      Physical Exam    BP (!) 148/79 (BP Location: Right arm)   Pulse 97   Temp 98.7  F (37.1  C)   Ht 1.676 m (5' 6\")   Wt 60.6 kg (133 lb 8 oz)   SpO2 99%   BMI 21.55 kg/m      General: alert, awake, not in acute distress, but looks tired.  HEENT: Head: Normal, normocephalic, atraumatic.  Eye: Icterus sclera.  Pharynx: Normal buccal mucosa. Normal pharynx.  Neck / Thyroid: Supple, no masses, nodes, nodules or enlargement.  Lymphatics: No abnormally enlarged lymph nodes.  Heart: S1 S2 RRR.   Abdomen: abdomen is soft without significant tenderness, masses, organomegaly or guarding  Extremities: normal strength, tone, and muscle mass  Skin:   CNS: non focal.    Lab Results    Recent Results (from the past 168 hour(s))   Prepare red blood cells (unit)   Result Value Ref Range    Blood Component Type Red Blood Cells     Product Code A3657T53     Unit Status Transfused     Unit Number [id number]     CROSSMATCH Compatible     CODING SYSTEM [id number]     ISSUE DATE AND TIME [id number]     UNIT ABO/RH A-     UNIT TYPE ISBT 0600    Comprehensive metabolic panel   Result Value Ref Range    Sodium 132 (L) 136 - 145 mmol/L    Potassium 5.7 (H) 3.5 - 5.0 mmol/L    Chloride 92 (L) 98 - 107 mmol/L    Carbon Dioxide (CO2) 33 (H) 22 - 31 mmol/L    Anion Gap 7 5 - 18 mmol/L    Urea Nitrogen 38 (H) 8 - 22 mg/dL    Creatinine 0.88 0.70 - 1.30 mg/dL    Calcium 9.5 8.5 - 10.5 mg/dL    Glucose 152 (H) 70 - 125 mg/dL    Alkaline Phosphatase 158 (H) 45 - 120 U/L    AST 92 (H) 0 - 40 U/L    ALT 44 0 - 45 U/L    Protein Total 7.6 6.0 - 8.0 g/dL    Albumin 2.9 (L) 3.5 - 5.0 g/dL    Bilirubin Total 16.9 (HH) 0.0 - 1.0 mg/dL    GFR Estimate >90 >60 mL/min/1.73m2   Adult Type and Screen   Result Value Ref Range    ABO/RH(D) A NEG     Antibody Screen Negative Negative    SPECIMEN EXPIRATION DATE [id number]    CBC with platelets and differential   Result Value Ref Range    WBC Count 12.5 (H) 4.0 - 11.0 10e3/uL    RBC Count 1.69 (L) " 4.40 - 5.90 10e6/uL    Hemoglobin 5.9 (LL) 13.3 - 17.7 g/dL    Hematocrit 17.7 (L) 40.0 - 53.0 %     (H) 78 - 100 fL    MCH 34.9 (H) 26.5 - 33.0 pg    MCHC 33.3 31.5 - 36.5 g/dL    RDW 23.2 (H) 10.0 - 15.0 %    Platelet Count 106 (L) 150 - 450 10e3/uL   Manual Differential   Result Value Ref Range    % Neutrophils 66 %    % Lymphocytes 24 %    % Monocytes 7 %    % Eosinophils 1 %    % Basophils 1 %    % Myelocytes 1 %    Absolute Neutrophils 8.3 1.6 - 8.3 10e3/uL    Absolute Lymphocytes 3.0 0.8 - 5.3 10e3/uL    Absolute Monocytes 0.9 0.0 - 1.3 10e3/uL    Absolute Eosinophils 0.1 0.0 - 0.7 10e3/uL    Absolute Basophils 0.1 0.0 - 0.2 10e3/uL    Absolute Myelocytes 0.1 (H) <=0.0 10e3/uL    RBC Morphology Confirmed RBC Indices     Platelet Assessment  Automated Count Confirmed. Platelet morphology is normal.     Automated Count Confirmed. Platelet morphology is normal.    Syd Cells Slight (A) None Seen    RBC Fragments Slight (A) None Seen   CBC with platelets and differential   Result Value Ref Range    WBC Count 12.5 (H) 4.0 - 11.0 10e3/uL    RBC Count 1.91 (L) 4.40 - 5.90 10e6/uL    Hemoglobin 6.8 (LL) 13.3 - 17.7 g/dL    Hematocrit 19.2 (L) 40.0 - 53.0 %     (H) 78 - 100 fL    MCH 35.6 (H) 26.5 - 33.0 pg    MCHC 35.4 31.5 - 36.5 g/dL    RDW 21.2 (H) 10.0 - 15.0 %    Platelet Count 100 (L) 150 - 450 10e3/uL    % Neutrophils 75 %    % Lymphocytes 16 %    % Monocytes 5 %    % Eosinophils 2 %    % Basophils 1 %    % Immature Granulocytes 1 %    NRBCs per 100 WBC 0 <1 /100    Absolute Neutrophils 9.4 (H) 1.6 - 8.3 10e3/uL    Absolute Lymphocytes 2.0 0.8 - 5.3 10e3/uL    Absolute Monocytes 0.6 0.0 - 1.3 10e3/uL    Absolute Eosinophils 0.2 0.0 - 0.7 10e3/uL    Absolute Basophils 0.2 0.0 - 0.2 10e3/uL    Absolute Immature Granulocytes 0.2 <=0.4 10e3/uL    Absolute NRBCs 0.0 10e3/uL   Adult Type and Screen   Result Value Ref Range    ABO/RH(D) A NEG     Antibody Screen Negative Negative    SPECIMEN EXPIRATION  DATE [id number]    Comprehensive metabolic panel   Result Value Ref Range    Sodium 125 (L) 136 - 145 mmol/L    Potassium 4.8 3.5 - 5.0 mmol/L    Chloride 88 (L) 98 - 107 mmol/L    Carbon Dioxide (CO2) 30 22 - 31 mmol/L    Anion Gap 7 5 - 18 mmol/L    Urea Nitrogen 40 (H) 8 - 22 mg/dL    Creatinine 0.97 0.70 - 1.30 mg/dL    Calcium 9.1 8.5 - 10.5 mg/dL    Glucose 276 (H) 70 - 125 mg/dL    Alkaline Phosphatase 166 (H) 45 - 120 U/L    AST 90 (H) 0 - 40 U/L    ALT 43 0 - 45 U/L    Protein Total 8.5 (H) 6.0 - 8.0 g/dL    Albumin 3.1 (L) 3.5 - 5.0 g/dL    Bilirubin Total 18.4 (HH) 0.0 - 1.0 mg/dL    GFR Estimate >90 >60 mL/min/1.73m2       Imaging    XR Chest 1 View    Result Date: 8/14/2022  EXAM: XR CHEST 1 VIEW LOCATION: [facility] DATE/TIME: 8/14/2022 5:57 PM INDICATION: sob COMPARISON: 06/18/2022     IMPRESSION: Low lung volumes with crowding the pulmonary vasculature. Patchy perihilar opacity and central peribronchial cuffing please correlate clinically for CHF. No pneumothorax. No pleural effusion. Mild cardiomegaly.    US Abdomen Limited    Result Date: 8/16/2022  EXAM: US ABDOMEN LIMITED LOCATION: [facility] DATE/TIME: 8/16/2022 11:52 AM INDICATION: HIGH VOLUME paracentesis with or without diagnostic fluid analysis with labs to be drawn if ordered. Total paracentesis volume as much as possible. COMPARISON: CT 04/07/2022 TECHNIQUE: Limited abdominal ultrasound. FINDINGS AND     IMPRESSION: No significant fluid. Safe paracentesis could not be performed and was deferred.    CT Head w/o Contrast    Result Date: 8/18/2022  EXAM: CT HEAD W/O CONTRAST LOCATION: [facility] DATE/TIME: 8/18/2022 3:17 PM INDICATION: Head injury. COMPARISON: None. TECHNIQUE: Routine CT Head without IV contrast. Multiplanar reformats. Dose reduction techniques were used. FINDINGS: INTRACRANIAL CONTENTS: No intracranial hemorrhage, extraaxial collection, or mass  effect.  No CT evidence of acute infarct. Normal parenchymal attenuation. Normal ventricles and sulci. VISUALIZED ORBITS/SINUSES/MASTOIDS: No intraorbital abnormality. No paranasal sinus mucosal disease. No middle ear or mastoid effusion. BONES/SOFT TISSUES: Left frontal scalp laceration. No acute calvarial abnormality.     IMPRESSION: 1.  No acute intracranial process. 2.  Left frontal scalp laceration.    CT Head w/o Contrast    Result Date: 8/14/2022  EXAM: CT HEAD W/O CONTRAST LOCATION: [facility] DATE/TIME: 8/14/2022 6:04 PM INDICATION: Altered mental status, found unresponsive COMPARISON: None. TECHNIQUE: Routine CT Head without IV contrast. Multiplanar reformats. Dose reduction techniques were used. FINDINGS: INTRACRANIAL CONTENTS: No intracranial hemorrhage, extraaxial collection, or mass effect.  No CT evidence of acute infarct. Normal parenchymal attenuation. Normal ventricles and sulci. VISUALIZED ORBITS/SINUSES/MASTOIDS: No intraorbital abnormality. No paranasal sinus mucosal disease. No middle ear or mastoid effusion. BONES/SOFT TISSUES: No acute abnormality.     IMPRESSION: 1.  No acute intracranial abnormality.    40 minutes spent on the date of the encounter doing chart review, history and exam, documentation and further activities as noted above.    Signed by: Tez Riojas MD

## 2022-09-06 ENCOUNTER — TELEPHONE (OUTPATIENT)
Dept: GASTROENTEROLOGY | Facility: CLINIC | Age: 27
End: 2022-09-06

## 2022-09-06 ENCOUNTER — LAB (OUTPATIENT)
Dept: INFUSION THERAPY | Facility: CLINIC | Age: 27
End: 2022-09-06
Attending: INTERNAL MEDICINE
Payer: COMMERCIAL

## 2022-09-06 VITALS
RESPIRATION RATE: 16 BRPM | TEMPERATURE: 98.2 F | DIASTOLIC BLOOD PRESSURE: 70 MMHG | OXYGEN SATURATION: 96 % | HEART RATE: 98 BPM | SYSTOLIC BLOOD PRESSURE: 128 MMHG

## 2022-09-06 DIAGNOSIS — K70.31 ALCOHOLIC CIRRHOSIS OF LIVER WITH ASCITES (H): ICD-10-CM

## 2022-09-06 DIAGNOSIS — D62 ACUTE POSTHEMORRHAGIC ANEMIA: Primary | ICD-10-CM

## 2022-09-06 LAB
ABO/RH(D): NORMAL
ANTIBODY SCREEN: NEGATIVE
BASO+EOS+MONOS NFR BLD AUTO: 7 %
BASOPHILS # BLD AUTO: 0.2 10E3/UL (ref 0–0.2)
BASOPHILS NFR BLD AUTO: 1 %
BLD PROD TYP BPU: NORMAL
BLOOD COMPONENT TYPE: NORMAL
CODING SYSTEM: NORMAL
CROSSMATCH: NORMAL
EOSINOPHIL # BLD AUTO: 0.3 10E3/UL (ref 0–0.7)
EOSINOPHIL NFR BLD AUTO: 2 %
ERYTHROCYTE [DISTWIDTH] IN BLOOD BY AUTOMATED COUNT: 20 % (ref 10–15)
HCT VFR BLD AUTO: 18.3 % (ref 40–53)
HGB BLD-MCNC: 6.5 G/DL (ref 13.3–17.7)
IMM GRANULOCYTES # BLD: 0.5 10E3/UL
IMM GRANULOCYTES NFR BLD: 4 %
ISSUE DATE AND TIME: NORMAL
LYMPHOCYTES # BLD AUTO: 2.5 10E3/UL (ref 0.8–5.3)
LYMPHOCYTES NFR BLD AUTO: 19 %
MCH RBC QN AUTO: 36.9 PG (ref 26.5–33)
MCHC RBC AUTO-ENTMCNC: 35.5 G/DL (ref 31.5–36.5)
MCV RBC AUTO: 104 FL (ref 78–100)
MONOCYTES # BLD AUTO: 0.9 10E3/UL (ref 0–1.3)
MONOCYTES NFR BLD AUTO: 7 %
NEUTROPHILS # BLD AUTO: 9 10E3/UL (ref 1.6–8.3)
NEUTROPHILS NFR BLD AUTO: 67 %
NRBC # BLD AUTO: 0 10E3/UL
NRBC BLD AUTO-RTO: 0 /100
PLATELET # BLD AUTO: 153 10E3/UL (ref 150–450)
RBC # BLD AUTO: 1.76 10E6/UL (ref 4.4–5.9)
SPECIMEN EXPIRATION DATE: NORMAL
UNIT ABO/RH: NORMAL
UNIT NUMBER: NORMAL
UNIT STATUS: NORMAL
UNIT TYPE ISBT: 600
WBC # BLD AUTO: 13.2 10E3/UL (ref 4–11)

## 2022-09-06 PROCEDURE — 86923 COMPATIBILITY TEST ELECTRIC: CPT | Performed by: INTERNAL MEDICINE

## 2022-09-06 PROCEDURE — 36415 COLL VENOUS BLD VENIPUNCTURE: CPT | Performed by: INTERNAL MEDICINE

## 2022-09-06 PROCEDURE — P9016 RBC LEUKOCYTES REDUCED: HCPCS | Performed by: INTERNAL MEDICINE

## 2022-09-06 PROCEDURE — 36430 TRANSFUSION BLD/BLD COMPNT: CPT

## 2022-09-06 PROCEDURE — 85004 AUTOMATED DIFF WBC COUNT: CPT | Performed by: INTERNAL MEDICINE

## 2022-09-06 PROCEDURE — 86901 BLOOD TYPING SEROLOGIC RH(D): CPT | Performed by: INTERNAL MEDICINE

## 2022-09-06 RX ORDER — HEPARIN SODIUM,PORCINE 10 UNIT/ML
5 VIAL (ML) INTRAVENOUS
Status: CANCELLED | OUTPATIENT
Start: 2022-09-06

## 2022-09-06 RX ORDER — HEPARIN SODIUM (PORCINE) LOCK FLUSH IV SOLN 100 UNIT/ML 100 UNIT/ML
5 SOLUTION INTRAVENOUS
Status: CANCELLED | OUTPATIENT
Start: 2022-09-06

## 2022-09-06 RX ORDER — BUMETANIDE 1 MG/1
1 TABLET ORAL DAILY
Qty: 60 TABLET | Refills: 5 | COMMUNITY
Start: 2022-09-06 | End: 2022-10-17

## 2022-09-06 RX ORDER — SPIRONOLACTONE 100 MG/1
100 TABLET, FILM COATED ORAL DAILY
Qty: 60 TABLET | Refills: 5 | Status: ON HOLD | COMMUNITY
Start: 2022-09-06 | End: 2023-03-17

## 2022-09-06 NOTE — PROGRESS NOTES
DATE:  9/6/2022   TIME OF RECEIPT FROM LAB:  0949  LAB TEST:  Hgb  LAB VALUE:  6.5  RESULTS GIVEN WITH READ-BACK TO (PROVIDER):  Dr. Riojas  TIME LAB VALUE REPORTED TO PROVIDER:   0955    Gwen Cohn RN

## 2022-09-06 NOTE — PROGRESS NOTES
Infusion Nursing Note:  Dillon Salter presents today for Labs, possible transfusion.    Patient seen by provider today: No   present during visit today: Not Applicable.    Note: /70 (BP Location: Left arm, Patient Position: Sitting, Cuff Size: Adult Regular)   Pulse 98   Temp 98.2  F (36.8  C) (Oral)   Resp 16   SpO2 96%     Spoke with Dr. Riojas regarding Hgb 6.5. Transfuse today and parameters for blood therapy plan updated.    Intravenous Access:  Peripheral IV placed.    Treatment Conditions:  Lab Results   Component Value Date    HGB 6.5 (LL) 09/06/2022    WBC 13.2 (H) 09/06/2022    ANEU 8.3 08/29/2022    ANEUTAUTO 9.0 (H) 09/06/2022     09/06/2022      Results reviewed, labs MET treatment parameters, ok to proceed with treatment.    Post Infusion Assessment:  Patient tolerated infusion without incident.  Site patent and intact, free from redness, edema or discomfort.  No evidence of extravasations.  Access discontinued per protocol.     Discharge Plan:   Discharge instructions reviewed with: Patient.  Patient and/or family verbalized understanding of discharge instructions and all questions answered.  Patient discharged in stable condition accompanied by: self.  Departure Mode: Ambulatory.      Gwen Cohn RN

## 2022-09-13 DIAGNOSIS — K70.31 ALCOHOLIC CIRRHOSIS OF LIVER WITH ASCITES (H): Primary | ICD-10-CM

## 2022-09-15 ENCOUNTER — INFUSION THERAPY VISIT (OUTPATIENT)
Dept: INFUSION THERAPY | Facility: CLINIC | Age: 27
End: 2022-09-15
Attending: INTERNAL MEDICINE
Payer: COMMERCIAL

## 2022-09-15 VITALS
OXYGEN SATURATION: 100 % | HEART RATE: 103 BPM | DIASTOLIC BLOOD PRESSURE: 69 MMHG | SYSTOLIC BLOOD PRESSURE: 129 MMHG | TEMPERATURE: 98.4 F | RESPIRATION RATE: 16 BRPM

## 2022-09-15 DIAGNOSIS — D62 ACUTE POSTHEMORRHAGIC ANEMIA: Primary | ICD-10-CM

## 2022-09-15 DIAGNOSIS — K70.31 ALCOHOLIC CIRRHOSIS OF LIVER WITH ASCITES (H): ICD-10-CM

## 2022-09-15 LAB
ABO/RH(D): NORMAL
ACANTHOCYTES BLD QL SMEAR: SLIGHT
ALBUMIN SERPL-MCNC: 3.1 G/DL (ref 3.5–5)
ALP SERPL-CCNC: 184 U/L (ref 45–120)
ALT SERPL W P-5'-P-CCNC: 54 U/L (ref 0–45)
ANION GAP SERPL CALCULATED.3IONS-SCNC: 13 MMOL/L (ref 5–18)
ANTIBODY SCREEN: NEGATIVE
AST SERPL W P-5'-P-CCNC: 95 U/L (ref 0–40)
BASOPHILS # BLD MANUAL: 1.1 10E3/UL (ref 0–0.2)
BASOPHILS NFR BLD MANUAL: 7 %
BILIRUB DIRECT SERPL-MCNC: 3 MG/DL
BILIRUB SERPL-MCNC: 13.4 MG/DL (ref 0–1)
BLD PROD TYP BPU: NORMAL
BLOOD COMPONENT TYPE: NORMAL
BUN SERPL-MCNC: 43 MG/DL (ref 8–22)
BURR CELLS BLD QL SMEAR: SLIGHT
CALCIUM SERPL-MCNC: 9.3 MG/DL (ref 8.5–10.5)
CHLORIDE BLD-SCNC: 94 MMOL/L (ref 98–107)
CO2 SERPL-SCNC: 24 MMOL/L (ref 22–31)
CODING SYSTEM: NORMAL
CREAT SERPL-MCNC: 0.93 MG/DL (ref 0.7–1.3)
CROSSMATCH: NORMAL
EOSINOPHIL # BLD MANUAL: 0.5 10E3/UL (ref 0–0.7)
EOSINOPHIL NFR BLD MANUAL: 3 %
ERYTHROCYTE [DISTWIDTH] IN BLOOD BY AUTOMATED COUNT: 16.5 % (ref 10–15)
GFR SERPL CREATININE-BSD FRML MDRD: >90 ML/MIN/1.73M2
GLUCOSE BLD-MCNC: 74 MG/DL (ref 70–125)
HCT VFR BLD AUTO: 16.1 % (ref 40–53)
HGB BLD-MCNC: 5.7 G/DL (ref 13.3–17.7)
INR PPP: 1.73 (ref 0.85–1.15)
ISSUE DATE AND TIME: NORMAL
LYMPHOCYTES # BLD MANUAL: 3.6 10E3/UL (ref 0.8–5.3)
LYMPHOCYTES NFR BLD MANUAL: 23 %
MCH RBC QN AUTO: 37.5 PG (ref 26.5–33)
MCHC RBC AUTO-ENTMCNC: 35.4 G/DL (ref 31.5–36.5)
MCV RBC AUTO: 106 FL (ref 78–100)
METAMYELOCYTES # BLD MANUAL: 0.2 10E3/UL
METAMYELOCYTES NFR BLD MANUAL: 1 %
MONOCYTES # BLD MANUAL: 0.6 10E3/UL (ref 0–1.3)
MONOCYTES NFR BLD MANUAL: 4 %
NEUTROPHILS # BLD MANUAL: 9.7 10E3/UL (ref 1.6–8.3)
NEUTROPHILS NFR BLD MANUAL: 62 %
PLAT MORPH BLD: ABNORMAL
PLATELET # BLD AUTO: 119 10E3/UL (ref 150–450)
POTASSIUM BLD-SCNC: 5.2 MMOL/L (ref 3.5–5)
PROT SERPL-MCNC: 8.2 G/DL (ref 6–8)
RBC # BLD AUTO: 1.52 10E6/UL (ref 4.4–5.9)
RBC MORPH BLD: ABNORMAL
SODIUM SERPL-SCNC: 131 MMOL/L (ref 136–145)
SPECIMEN EXPIRATION DATE: NORMAL
UNIT ABO/RH: NORMAL
UNIT NUMBER: NORMAL
UNIT STATUS: NORMAL
UNIT TYPE ISBT: 600
WBC # BLD AUTO: 15.7 10E3/UL (ref 4–11)

## 2022-09-15 PROCEDURE — P9016 RBC LEUKOCYTES REDUCED: HCPCS | Performed by: INTERNAL MEDICINE

## 2022-09-15 PROCEDURE — 36430 TRANSFUSION BLD/BLD COMPNT: CPT

## 2022-09-15 PROCEDURE — 86901 BLOOD TYPING SEROLOGIC RH(D): CPT | Performed by: INTERNAL MEDICINE

## 2022-09-15 PROCEDURE — 80053 COMPREHEN METABOLIC PANEL: CPT

## 2022-09-15 PROCEDURE — 85007 BL SMEAR W/DIFF WBC COUNT: CPT | Performed by: INTERNAL MEDICINE

## 2022-09-15 PROCEDURE — 85048 AUTOMATED LEUKOCYTE COUNT: CPT | Performed by: INTERNAL MEDICINE

## 2022-09-15 PROCEDURE — 82248 BILIRUBIN DIRECT: CPT

## 2022-09-15 PROCEDURE — 36415 COLL VENOUS BLD VENIPUNCTURE: CPT

## 2022-09-15 PROCEDURE — 86923 COMPATIBILITY TEST ELECTRIC: CPT | Performed by: INTERNAL MEDICINE

## 2022-09-15 PROCEDURE — 85610 PROTHROMBIN TIME: CPT

## 2022-09-15 RX ORDER — HEPARIN SODIUM,PORCINE 10 UNIT/ML
5 VIAL (ML) INTRAVENOUS
Status: CANCELLED | OUTPATIENT
Start: 2022-09-15

## 2022-09-15 RX ORDER — HEPARIN SODIUM (PORCINE) LOCK FLUSH IV SOLN 100 UNIT/ML 100 UNIT/ML
5 SOLUTION INTRAVENOUS
Status: CANCELLED | OUTPATIENT
Start: 2022-09-15

## 2022-09-15 NOTE — PROGRESS NOTES
DATE:  9/15/2022   TIME OF RECEIPT FROM LAB:  1003  LAB TEST:  hemaglobin  LAB VALUE:  5.7  RESULTS GIVEN WITH READ-BACK TO dr riojas  TIME LAB VALUE REPORTED TO PROVIDER:   Dr Riojas 1014

## 2022-09-15 NOTE — PROGRESS NOTES
Infusion Nursing Note:  Dillon Salter presents today for Labs and blood transfusion..    Patient seen by provider today: No   present during visit today: Not Applicable.    Note: Patient arrived ambulatory with Aunt. Patient preferred to lay in a bed for treatment today. Upon arrival patient had concerns of a small cut on his right upper ear. Small amount of blood was noted in the area. States a small lamp fell onto his right upper ear on Monday afternoon. Denies LOC or any other trauma. After labs were drawn and IV started, the ear area was cleaned with water and a pressure dressing was applied. Writer notes a small laceration in the upper right ear. Patient and Aunt were advised to monitor the area for signs and symptoms of infection. Ear wound was discussed with Dr Riojas and it was determined to send the patient home with a small amount of supplies to keep the area clean and dry. Patient agrees to be seen by his PCP or Urgent Care if the ear injury does not improve or becomes worse. Patient agrees. Writer also spoke with patient's mother on the phone and discussed the treatment plan.     Intravenous Access:  Peripheral IV placed.    Treatment Conditions:  Lab Results   Component Value Date    HGB 5.7 (LL) 09/15/2022    WBC 15.7 (H) 09/15/2022    ANEU 9.7 (H) 09/15/2022    ANEUTAUTO 9.0 (H) 09/06/2022     (L) 09/15/2022        Post Infusion Assessment:  Patient tolerated blood transfusion without incident. Patient received one unit of PRBC's today. Patient states he only wants one unit of blood today. Follow up appointment next week with Dr Walters.     Discharge Plan:   Patient discharged in stable condition accompanied by: self.      Lor Patel RN

## 2022-09-19 ENCOUNTER — OFFICE VISIT (OUTPATIENT)
Dept: GASTROENTEROLOGY | Facility: CLINIC | Age: 27
End: 2022-09-19
Attending: PHYSICIAN ASSISTANT
Payer: COMMERCIAL

## 2022-09-19 VITALS
DIASTOLIC BLOOD PRESSURE: 71 MMHG | BODY MASS INDEX: 21.79 KG/M2 | OXYGEN SATURATION: 100 % | SYSTOLIC BLOOD PRESSURE: 120 MMHG | TEMPERATURE: 98.3 F | WEIGHT: 135 LBS | HEART RATE: 96 BPM

## 2022-09-19 DIAGNOSIS — K76.82 HEPATIC ENCEPHALOPATHY (H): ICD-10-CM

## 2022-09-19 DIAGNOSIS — E44.0 MODERATE PROTEIN-CALORIE MALNUTRITION (H): ICD-10-CM

## 2022-09-19 DIAGNOSIS — K70.31 ALCOHOLIC CIRRHOSIS OF LIVER WITH ASCITES (H): Primary | ICD-10-CM

## 2022-09-19 DIAGNOSIS — E87.5 HYPERKALEMIA: ICD-10-CM

## 2022-09-19 DIAGNOSIS — D64.9 ANEMIA, UNSPECIFIED TYPE: ICD-10-CM

## 2022-09-19 PROCEDURE — 99215 OFFICE O/P EST HI 40 MIN: CPT | Performed by: PHYSICIAN ASSISTANT

## 2022-09-19 PROCEDURE — G0463 HOSPITAL OUTPT CLINIC VISIT: HCPCS

## 2022-09-19 ASSESSMENT — PAIN SCALES - GENERAL: PAINLEVEL: NO PAIN (0)

## 2022-09-19 NOTE — LETTER
9/19/2022         RE: Dillon Salter  [address] [address]        Dear Colleague,    Thank you for referring your patient, Dillon Salter, to the [facility] [place]. Please see a copy of my visit note below.    Hepatology Follow-up Clinic note  Dillon Salter   Date of Birth 1995  Date of Service 9/19/2022    Reason for follow-up: Cirrhosis          Assessment/plan:   Dillon Salter is a 26 year old male with history of ETOH cirrhosis, complicated by ascites/significant fluid overload improved with diuretics and hepatic encephalopathy, controlled with current regimen and history of variceal bleeding. Tbili has improved from 18.4 to 13.4 in the past month, remains primarily indirect, with Dbili of 3.0 and INR 1.73. MELD-Na 26. He is up to date with HCC Screening and variceal screening. He was congratulated on sobriety and we reviewed importance of moving forward with CD treatment should liver transplantation evaluation be considered.      # Ascites/anasarca, improved:    - Continue 1 mg bumex daily   - Continue 100 mg spironolactone daily     # History of alcohol abuse:   - Continue absolute sobriety   - Has completed ROBERT assessment. Discussed importance of starting treatment as recommended.     # Hepatic encephalopathy, improved:   - Continue lactulose, dose to 3-5 BM daily   - Continue rifaximin 550 mg twice daily     # HCC screening:   - US abdomen due in November 2022    # Anemia, hemolytic:   - Continue PRBC transfusion PRN with Hemoglobin < 6.5 with his   hematologist  - Frequent blood transfusion/early DM/ rule out HFE mutation     # Hyperkalemia, stable:   - labs drawn prior to diuretics   - Continue drinking 2 L fluid  - Continue optimization of of blood glucose     # Protein calorie malnutrition:   - Continue high protein diet ( gm daily)     # Deconditioning:   - Continue increasing physical activity as able     # Follow-up in clinic with  Dr. Wood in 6-8 weeks to discuss     Momo Walters PA-C   [facility]    Total time for E/M services performed on the date of the encounter 60 minutes.  This included review of previous: clinic visits, hospital records, lab results, imaging studies, and procedural documentation. Time also includes patient visit, documentation and discussion with other providers.  The findings from this review are summarized in the above note.     -----------------------------------------------------       HPI:   Dillon Salter is a 26 year old male presenting for follow-up. He is joined by his mother and aunt today.     Cirrhosis  Complicated by:  - Ascites  - HE  - Hx of variceal bleeding  EGD: 6/18/2022, Mild PHG, Grade I EV  HCC: 5/24/2022    Patient was last seen by me on 8/22/2022. No recent hospitalizations or ER visits.     He has continued to get PRBC about once a week, which is a decrease from twice weekly.     Diuretics were adjusted last time to Bumex 1 mg twice daily and 200 mg spironolactone. After significant diuresis the following week, dose was adjust to Bumex 1 mg daily and then 100 mg spironolactone.  No problems with fluid in legs or abdomen since diuretic change.    Weight is down about 20 lbs in the past month, mostly fluid.    He continues to take lactulose 30 mL twice daily. He is typically having 3 bowel movements a day.  No recent problems with confusion.    Appetite is okay.  Eats a fair amount of fruits and vegetables and has to be reminded protein intake.     Patient also denies melena, hematochezia or hematemesis.  Patient denies fevers, sweats or chills.    Last drank alcohol April 6th.  Has had ROBERT assessment, but has not been able to establish outpatient treatment due to frequent hospitalizations.          Medications:     Current Outpatient Medications   Medication     alcohol swab prep pads     blood glucose (NO BRAND SPECIFIED) test strip     blood glucose monitoring  (NO BRAND SPECIFIED) meter device kit     bumetanide (BUMEX) 1 MG tablet     cephALEXin (KEFLEX) 500 MG capsule     fluconazole (DIFLUCAN) 150 MG tablet     FLUoxetine (PROZAC) 20 MG capsule     folic acid (FOLVITE) 1 MG tablet     gabapentin (NEURONTIN) 100 MG capsule     hydrOXYzine (ATARAX) 25 MG tablet     insulin aspart (NOVOLOG FLEXPEN) 100 UNIT/ML pen     insulin glargine (LANTUS PEN) 100 UNIT/ML pen     insulin pen needle (ULTICARE MICRO) 32G X 4 MM miscellaneous     lactulose (CHRONULAC) 10 GM/15ML solution     magnesium oxide 400 MG CAPS     multivitamin, therapeutic (THERA-VIT) TABS tablet     oxyCODONE (ROXICODONE) 5 MG tablet     pantoprazole (PROTONIX) 40 MG EC tablet     rifaximin (XIFAXAN) 550 MG TABS tablet     spironolactone (ALDACTONE) 100 MG tablet     thiamine (B-1) 100 MG tablet     thin (NO BRAND SPECIFIED) lancets     No current facility-administered medications for this visit.            Review of Systems:   10 points ROS was obtained and highlighted in the HPI, otherwise negative.          Physical Exam:   /71   Pulse 96   Temp 98.3  F (36.8  C)   Wt 61.2 kg (135 lb)   SpO2 100%   BMI 21.79 kg/m      Gen: A&Ox3, NAD, well developed  HEENT: non-icteric  CV: RRR, no overt murmurs  Lung: CTA Bilatererally, no wheezing or crackles.   Lym- no palpable lymphadenopathy  Abd: soft, NT, ND,  no organomegaly.   Ext: no edema, intact pulses.   Skin: No rash,  no palmar erythema, telangiectasias or jaundice  Neuro: grossly intact, no asterixis   Psych: appropriate mood and affects         Data:   Reviewed in person and significant for:    Lab Results   Component Value Date     09/15/2022      Lab Results   Component Value Date    POTASSIUM 5.2 09/15/2022     Lab Results   Component Value Date    CHLORIDE 94 09/15/2022     Lab Results   Component Value Date    CO2 24 09/15/2022     Lab Results   Component Value Date    BUN 43 09/15/2022     Lab Results   Component Value Date    CR 0.93  09/15/2022       Lab Results   Component Value Date    WBC 15.7 09/15/2022     Lab Results   Component Value Date    HGB 5.7 09/15/2022     Lab Results   Component Value Date    HCT 16.1 09/15/2022     Lab Results   Component Value Date     09/15/2022     Lab Results   Component Value Date     09/15/2022       Lab Results   Component Value Date    AST 95 09/15/2022     Lab Results   Component Value Date    ALT 54 09/15/2022     No results found for: BILICONJ   Lab Results   Component Value Date    BILITOTAL 13.4 09/15/2022       Lab Results   Component Value Date    ALBUMIN 3.1 09/15/2022     Lab Results   Component Value Date    PROTTOTAL 8.2 09/15/2022      Lab Results   Component Value Date    ALKPHOS 184 09/15/2022       Lab Results   Component Value Date    INR 1.73 09/15/2022     EXAM: CT ABDOMEN PELVIS W CONTRAST  LOCATION: [facility]  DATE/TIME: 4/7/2022 12:37 AM     INDICATION: Abdominal distension.  COMPARISON: None.  TECHNIQUE: CT scan of the abdomen and pelvis was performed following injection of IV contrast. Multiplanar reformats were obtained. Dose reduction techniques were used.  CONTRAST: [id number] 80 ml.     FINDINGS:   LOWER CHEST: Multifocal rounded solid and groundglass nodular infiltrates with superimposed interstitial infiltrates in the visualized lungs.     HEPATOBILIARY: Hepatomegaly. Cirrhotic appearance to the liver with trace perihepatic ascites. Gallbladder is distended with wall thickening and sludge. Upper abdominal varices compatible with portal hypertension.     PANCREAS: Normal.     SPLEEN: Splenomegaly. Splenorenal shunt compatible with portal hypertension.     ADRENAL GLANDS: Normal.     KIDNEYS/BLADDER: Normal.     BOWEL: Normal.     LYMPH NODES: Normal.     VASCULATURE: Unremarkable.     PELVIC ORGANS: Calcification vas deferens compatible with diabetes. Anasarca.     MUSCULOSKELETAL: Normal.                                                                       IMPRESSION:   1.  Multifocal regions of solid, and groundglass nodularity in the visualized mid and lower lungs with superimposed background of mild interstitial infiltrates.     2.  Cirrhosis with splenomegaly and small volume ascites. Multiple varices compatible with portal hypertension.     3.  Gallbladder is distended with wall thickening which can be seen with parenchymal disease. Sludge within the gallbladder lumen.     4.  Anasarca.     5.  Calcification vas deferens compatible with diabetes.      Again, thank you for allowing me to participate in the care of your patient.        Sincerely,        Momo Walters PA-C

## 2022-09-19 NOTE — NURSING NOTE
Chief Complaint   Patient presents with     RECHECK     Return visit. No new Concerns.     Blood pressure 120/71, pulse 96, temperature 98.3  F (36.8  C), weight 61.2 kg (135 lb), SpO2 100 %.    KAYLYN MENJIVAR

## 2022-09-19 NOTE — PROGRESS NOTES
Hepatology Follow-up Clinic note  Dillon Salter   Date of Birth 1995  Date of Service 9/19/2022    Reason for follow-up: Cirrhosis          Assessment/plan:   Dillon Salter is a 26 year old male with history of ETOH cirrhosis, complicated by ascites/significant fluid overload improved with diuretics and hepatic encephalopathy, controlled with current regimen and history of variceal bleeding. Tbili has improved from 18.4 to 13.4 in the past month, remains primarily indirect, with Dbili of 3.0 and INR 1.73. MELD-Na 26. He is up to date with HCC Screening and variceal screening. He was congratulated on sobriety and we reviewed importance of moving forward with CD treatment should liver transplantation evaluation be considered.      # Ascites/anasarca, improved:    - Continue 1 mg bumex daily   - Continue 100 mg spironolactone daily     # History of alcohol abuse:   - Continue absolute sobriety   - Has completed ROBERT assessment. Discussed importance of starting treatment as recommended.     # Hepatic encephalopathy, improved:   - Continue lactulose, dose to 3-5 BM daily   - Continue rifaximin 550 mg twice daily     # HCC screening:   - US abdomen due in November 2022    # Anemia, hemolytic:   - Continue PRBC transfusion PRN with Hemoglobin < 6.5 with his   hematologist  - Frequent blood transfusion/early DM/ rule out HFE mutation     # Hyperkalemia, stable:   - labs drawn prior to diuretics   - Continue drinking 2 L fluid  - Continue optimization of of blood glucose     # Protein calorie malnutrition:   - Continue high protein diet ( gm daily)     # Deconditioning:   - Continue increasing physical activity as able     # Follow-up in clinic with Dr. Wood in 6-8 weeks to discuss     Momo Walters PA-C   [facility]    Total time for E/M services performed on the date of the encounter 60 minutes.  This included review of previous: clinic visits, hospital records, lab results,  imaging studies, and procedural documentation. Time also includes patient visit, documentation and discussion with other providers.  The findings from this review are summarized in the above note.     -----------------------------------------------------       HPI:   Dillon Salter is a 26 year old male presenting for follow-up. He is joined by his mother and aunt today.     Cirrhosis  Complicated by:  - Ascites  - HE  - Hx of variceal bleeding  EGD: 6/18/2022, Mild PHG, Grade I EV  HCC: 5/24/2022    Patient was last seen by me on 8/22/2022. No recent hospitalizations or ER visits.     He has continued to get PRBC about once a week, which is a decrease from twice weekly.     Diuretics were adjusted last time to Bumex 1 mg twice daily and 200 mg spironolactone. After significant diuresis the following week, dose was adjust to Bumex 1 mg daily and then 100 mg spironolactone.  No problems with fluid in legs or abdomen since diuretic change.    Weight is down about 20 lbs in the past month, mostly fluid.    He continues to take lactulose 30 mL twice daily. He is typically having 3 bowel movements a day.  No recent problems with confusion.    Appetite is okay.  Eats a fair amount of fruits and vegetables and has to be reminded protein intake.     Patient also denies melena, hematochezia or hematemesis.  Patient denies fevers, sweats or chills.    Last drank alcohol April 6th.  Has had ROBERT assessment, but has not been able to establish outpatient treatment due to frequent hospitalizations.          Medications:     Current Outpatient Medications   Medication     alcohol swab prep pads     blood glucose (NO BRAND SPECIFIED) test strip     blood glucose monitoring (NO BRAND SPECIFIED) meter device kit     bumetanide (BUMEX) 1 MG tablet     cephALEXin (KEFLEX) 500 MG capsule     fluconazole (DIFLUCAN) 150 MG tablet     FLUoxetine (PROZAC) 20 MG capsule     folic acid (FOLVITE) 1 MG tablet     gabapentin (NEURONTIN) 100 MG  capsule     hydrOXYzine (ATARAX) 25 MG tablet     insulin aspart (NOVOLOG FLEXPEN) 100 UNIT/ML pen     insulin glargine (LANTUS PEN) 100 UNIT/ML pen     insulin pen needle (ULTICARE MICRO) 32G X 4 MM miscellaneous     lactulose (CHRONULAC) 10 GM/15ML solution     magnesium oxide 400 MG CAPS     multivitamin, therapeutic (THERA-VIT) TABS tablet     oxyCODONE (ROXICODONE) 5 MG tablet     pantoprazole (PROTONIX) 40 MG EC tablet     rifaximin (XIFAXAN) 550 MG TABS tablet     spironolactone (ALDACTONE) 100 MG tablet     thiamine (B-1) 100 MG tablet     thin (NO BRAND SPECIFIED) lancets     No current facility-administered medications for this visit.            Review of Systems:   10 points ROS was obtained and highlighted in the HPI, otherwise negative.          Physical Exam:   /71   Pulse 96   Temp 98.3  F (36.8  C)   Wt 61.2 kg (135 lb)   SpO2 100%   BMI 21.79 kg/m      Gen: A&Ox3, NAD, well developed  HEENT: non-icteric  CV: RRR, no overt murmurs  Lung: CTA Bilatererally, no wheezing or crackles.   Lym- no palpable lymphadenopathy  Abd: soft, NT, ND,  no organomegaly.   Ext: no edema, intact pulses.   Skin: No rash,  no palmar erythema, telangiectasias or jaundice  Neuro: grossly intact, no asterixis   Psych: appropriate mood and affects         Data:   Reviewed in person and significant for:    Lab Results   Component Value Date     09/15/2022      Lab Results   Component Value Date    POTASSIUM 5.2 09/15/2022     Lab Results   Component Value Date    CHLORIDE 94 09/15/2022     Lab Results   Component Value Date    CO2 24 09/15/2022     Lab Results   Component Value Date    BUN 43 09/15/2022     Lab Results   Component Value Date    CR 0.93 09/15/2022       Lab Results   Component Value Date    WBC 15.7 09/15/2022     Lab Results   Component Value Date    HGB 5.7 09/15/2022     Lab Results   Component Value Date    HCT 16.1 09/15/2022     Lab Results   Component Value Date     09/15/2022      Lab Results   Component Value Date     09/15/2022       Lab Results   Component Value Date    AST 95 09/15/2022     Lab Results   Component Value Date    ALT 54 09/15/2022     No results found for: BILICONJ   Lab Results   Component Value Date    BILITOTAL 13.4 09/15/2022       Lab Results   Component Value Date    ALBUMIN 3.1 09/15/2022     Lab Results   Component Value Date    PROTTOTAL 8.2 09/15/2022      Lab Results   Component Value Date    ALKPHOS 184 09/15/2022       Lab Results   Component Value Date    INR 1.73 09/15/2022     EXAM: CT ABDOMEN PELVIS W CONTRAST  LOCATION: [facility]  DATE/TIME: 4/7/2022 12:37 AM     INDICATION: Abdominal distension.  COMPARISON: None.  TECHNIQUE: CT scan of the abdomen and pelvis was performed following injection of IV contrast. Multiplanar reformats were obtained. Dose reduction techniques were used.  CONTRAST: [id number] 80 ml.     FINDINGS:   LOWER CHEST: Multifocal rounded solid and groundglass nodular infiltrates with superimposed interstitial infiltrates in the visualized lungs.     HEPATOBILIARY: Hepatomegaly. Cirrhotic appearance to the liver with trace perihepatic ascites. Gallbladder is distended with wall thickening and sludge. Upper abdominal varices compatible with portal hypertension.     PANCREAS: Normal.     SPLEEN: Splenomegaly. Splenorenal shunt compatible with portal hypertension.     ADRENAL GLANDS: Normal.     KIDNEYS/BLADDER: Normal.     BOWEL: Normal.     LYMPH NODES: Normal.     VASCULATURE: Unremarkable.     PELVIC ORGANS: Calcification vas deferens compatible with diabetes. Anasarca.     MUSCULOSKELETAL: Normal.                                                                      IMPRESSION:   1.  Multifocal regions of solid, and groundglass nodularity in the visualized mid and lower lungs with superimposed background of mild interstitial infiltrates.     2.  Cirrhosis with splenomegaly and small volume ascites.  Multiple varices compatible with portal hypertension.     3.  Gallbladder is distended with wall thickening which can be seen with parenchymal disease. Sludge within the gallbladder lumen.     4.  Anasarca.     5.  Calcification vas deferens compatible with diabetes.

## 2022-09-22 ENCOUNTER — INFUSION THERAPY VISIT (OUTPATIENT)
Dept: INFUSION THERAPY | Facility: CLINIC | Age: 27
End: 2022-09-22
Attending: INTERNAL MEDICINE
Payer: COMMERCIAL

## 2022-09-22 VITALS
RESPIRATION RATE: 16 BRPM | HEART RATE: 97 BPM | OXYGEN SATURATION: 100 % | SYSTOLIC BLOOD PRESSURE: 137 MMHG | DIASTOLIC BLOOD PRESSURE: 82 MMHG | TEMPERATURE: 98.8 F

## 2022-09-22 DIAGNOSIS — K70.31 ALCOHOLIC CIRRHOSIS OF LIVER WITH ASCITES (H): ICD-10-CM

## 2022-09-22 DIAGNOSIS — D62 ACUTE POSTHEMORRHAGIC ANEMIA: Primary | ICD-10-CM

## 2022-09-22 DIAGNOSIS — K70.31 ALCOHOLIC CIRRHOSIS OF LIVER WITH ASCITES (H): Primary | ICD-10-CM

## 2022-09-22 LAB
ABO/RH(D): NORMAL
ANION GAP SERPL CALCULATED.3IONS-SCNC: 11 MMOL/L (ref 5–18)
ANTIBODY SCREEN: NEGATIVE
BASOPHILS # BLD AUTO: 0.2 10E3/UL (ref 0–0.2)
BASOPHILS NFR BLD AUTO: 1 %
BLD PROD TYP BPU: NORMAL
BLOOD COMPONENT TYPE: NORMAL
BUN SERPL-MCNC: 29 MG/DL (ref 8–22)
CALCIUM SERPL-MCNC: 9.2 MG/DL (ref 8.5–10.5)
CHLORIDE BLD-SCNC: 95 MMOL/L (ref 98–107)
CO2 SERPL-SCNC: 26 MMOL/L (ref 22–31)
CODING SYSTEM: NORMAL
CREAT SERPL-MCNC: 0.91 MG/DL (ref 0.7–1.3)
CROSSMATCH: NORMAL
EOSINOPHIL # BLD AUTO: 0.2 10E3/UL (ref 0–0.7)
EOSINOPHIL NFR BLD AUTO: 2 %
ERYTHROCYTE [DISTWIDTH] IN BLOOD BY AUTOMATED COUNT: 15.6 % (ref 10–15)
GFR SERPL CREATININE-BSD FRML MDRD: >90 ML/MIN/1.73M2
GLUCOSE BLD-MCNC: 230 MG/DL (ref 70–125)
HCT VFR BLD AUTO: 17.4 % (ref 40–53)
HGB BLD-MCNC: 6 G/DL (ref 13.3–17.7)
HOLD SPECIMEN: NORMAL
HOLD SPECIMEN: NORMAL
IMM GRANULOCYTES # BLD: 0.2 10E3/UL
IMM GRANULOCYTES NFR BLD: 2 %
ISSUE DATE AND TIME: NORMAL
LYMPHOCYTES # BLD AUTO: 2.1 10E3/UL (ref 0.8–5.3)
LYMPHOCYTES NFR BLD AUTO: 18 %
MCH RBC QN AUTO: 37.3 PG (ref 26.5–33)
MCHC RBC AUTO-ENTMCNC: 34.5 G/DL (ref 31.5–36.5)
MCV RBC AUTO: 108 FL (ref 78–100)
MONOCYTES # BLD AUTO: 0.6 10E3/UL (ref 0–1.3)
MONOCYTES NFR BLD AUTO: 5 %
NEUTROPHILS # BLD AUTO: 8.2 10E3/UL (ref 1.6–8.3)
NEUTROPHILS NFR BLD AUTO: 72 %
NRBC # BLD AUTO: 0 10E3/UL
NRBC BLD AUTO-RTO: 0 /100
PLATELET # BLD AUTO: 105 10E3/UL (ref 150–450)
POTASSIUM BLD-SCNC: 5.3 MMOL/L (ref 3.5–5)
RBC # BLD AUTO: 1.61 10E6/UL (ref 4.4–5.9)
SODIUM SERPL-SCNC: 132 MMOL/L (ref 136–145)
SPECIMEN EXPIRATION DATE: NORMAL
UNIT ABO/RH: NORMAL
UNIT NUMBER: NORMAL
UNIT STATUS: NORMAL
UNIT TYPE ISBT: 600
WBC # BLD AUTO: 11.5 10E3/UL (ref 4–11)

## 2022-09-22 PROCEDURE — 86923 COMPATIBILITY TEST ELECTRIC: CPT | Performed by: INTERNAL MEDICINE

## 2022-09-22 PROCEDURE — 80048 BASIC METABOLIC PNL TOTAL CA: CPT

## 2022-09-22 PROCEDURE — P9016 RBC LEUKOCYTES REDUCED: HCPCS | Performed by: INTERNAL MEDICINE

## 2022-09-22 PROCEDURE — 86901 BLOOD TYPING SEROLOGIC RH(D): CPT | Performed by: INTERNAL MEDICINE

## 2022-09-22 PROCEDURE — 85025 COMPLETE CBC W/AUTO DIFF WBC: CPT | Performed by: INTERNAL MEDICINE

## 2022-09-22 PROCEDURE — 36430 TRANSFUSION BLD/BLD COMPNT: CPT

## 2022-09-22 PROCEDURE — 36415 COLL VENOUS BLD VENIPUNCTURE: CPT | Performed by: INTERNAL MEDICINE

## 2022-09-22 RX ORDER — HEPARIN SODIUM,PORCINE 10 UNIT/ML
5 VIAL (ML) INTRAVENOUS
Status: CANCELLED | OUTPATIENT
Start: 2022-09-22

## 2022-09-22 RX ORDER — HEPARIN SODIUM (PORCINE) LOCK FLUSH IV SOLN 100 UNIT/ML 100 UNIT/ML
5 SOLUTION INTRAVENOUS
Status: CANCELLED | OUTPATIENT
Start: 2022-09-22

## 2022-09-22 NOTE — PROGRESS NOTES
DATE:  9/22/2022   TIME OF RECEIPT FROM LAB:  0958  LAB TEST:  Hgb  LAB VALUE:  6.0  RESULTS GIVEN WITH READ-BACK TO (PROVIDER):  NA. Blood product therapy plan in place. 1 unit PRBCs ordered.  TIME LAB VALUE REPORTED TO PROVIDER:   NA

## 2022-09-22 NOTE — PROGRESS NOTES
Infusion Nursing Note:  Dillon Salter presents today for labs and possible blood transfusion.    Patient seen by provider today: No   present during visit today: Not Applicable.    Note: Dillon arrived ambulatory accompanied by his Mom for labs and possible blood transfusion. Dillon stated he is feeling good today and his energy has improved. Plan of care reviewed. Dillon's Mom, Leticia, stated Dillon had a provider visit earlier this week and she believes they wanted to check his potassium again. KELSEA Lr and her nurse, Tejal, were paged. BMP was ordered and drawn today. Noted results were reviewed by Momo and message was sent to Dillon via [employer].    Intravenous Access:  Labs drawn without difficulty.  Peripheral IV placed.    Treatment Conditions:  Lab Results   Component Value Date    HGB 6.0 (LL) 09/22/2022    WBC 11.5 (H) 09/22/2022    ANEU 9.7 (H) 09/15/2022    ANEUTAUTO 8.2 09/22/2022     (L) 09/22/2022      Results reviewed, labs MET treatment parameters, ok to proceed with treatment.   Per blood product therapy plan, Dillon will receive 1 unit PRBCs.  Blood transfusion consent signed 6/27/2022.    Post Infusion Assessment:  Patient tolerated infusion without incident.  Blood return noted pre and post infusion.  Site patent and intact, free from redness, edema or discomfort.  Access discontinued per protocol.     Discharge Plan:   Patient will return Sept 29th for next appointment.   Patient discharged in stable condition accompanied by: Mom.  Departure Mode: Ambulatory.      Zoe Garcia RN

## 2022-09-25 ENCOUNTER — HEALTH MAINTENANCE LETTER (OUTPATIENT)
Age: 27
End: 2022-09-25

## 2022-09-29 ENCOUNTER — REFERRAL (OUTPATIENT)
Dept: TRANSPLANT | Facility: CLINIC | Age: 27
End: 2022-09-29

## 2022-09-29 ENCOUNTER — LAB (OUTPATIENT)
Dept: INFUSION THERAPY | Facility: CLINIC | Age: 27
End: 2022-09-29
Attending: INTERNAL MEDICINE
Payer: COMMERCIAL

## 2022-09-29 VITALS
TEMPERATURE: 97.4 F | OXYGEN SATURATION: 100 % | HEART RATE: 99 BPM | RESPIRATION RATE: 18 BRPM | SYSTOLIC BLOOD PRESSURE: 133 MMHG | DIASTOLIC BLOOD PRESSURE: 66 MMHG

## 2022-09-29 DIAGNOSIS — K70.30 ALCOHOLIC CIRRHOSIS (H): ICD-10-CM

## 2022-09-29 DIAGNOSIS — K76.6 PORTAL HYPERTENSION (H): ICD-10-CM

## 2022-09-29 DIAGNOSIS — D62 ACUTE POSTHEMORRHAGIC ANEMIA: ICD-10-CM

## 2022-09-29 DIAGNOSIS — K70.31 ALCOHOLIC CIRRHOSIS OF LIVER WITH ASCITES (H): Primary | ICD-10-CM

## 2022-09-29 PROBLEM — F10.930 ALCOHOL WITHDRAWAL, UNCOMPLICATED (H): Chronic | Status: RESOLVED | Noted: 2022-04-08 | Resolved: 2022-09-29

## 2022-09-29 PROBLEM — S31.109A OPEN ABDOMINAL WALL WOUND: Status: RESOLVED | Noted: 2022-04-08 | Resolved: 2022-09-29

## 2022-09-29 PROBLEM — K72.10 END-STAGE LIVER DISEASE (H): Status: RESOLVED | Noted: 2022-08-14 | Resolved: 2022-09-29

## 2022-09-29 PROBLEM — K92.0 HEMATEMESIS, PRESENCE OF NAUSEA NOT SPECIFIED: Status: RESOLVED | Noted: 2022-05-24 | Resolved: 2022-09-29

## 2022-09-29 PROBLEM — F10.929 ALCOHOL INTOXICATION (H): Chronic | Status: RESOLVED | Noted: 2022-04-08 | Resolved: 2022-09-29

## 2022-09-29 PROBLEM — K72.10 END STAGE LIVER DISEASE (H): Status: RESOLVED | Noted: 2022-08-14 | Resolved: 2022-09-29

## 2022-09-29 PROBLEM — D63.8 ANEMIA OF CHRONIC ILLNESS: Status: RESOLVED | Noted: 2022-04-08 | Resolved: 2022-09-29

## 2022-09-29 PROBLEM — G93.40 ENCEPHALOPATHY: Status: RESOLVED | Noted: 2022-05-24 | Resolved: 2022-09-29

## 2022-09-29 PROBLEM — K06.8 BLEEDING GUMS: Status: RESOLVED | Noted: 2022-04-06 | Resolved: 2022-09-29

## 2022-09-29 PROBLEM — D64.9 ANEMIA, UNSPECIFIED TYPE: Status: RESOLVED | Noted: 2022-05-24 | Resolved: 2022-09-29

## 2022-09-29 LAB
ERYTHROCYTE [DISTWIDTH] IN BLOOD BY AUTOMATED COUNT: 14.6 % (ref 10–15)
HCT VFR BLD AUTO: 19.9 % (ref 40–53)
HGB BLD-MCNC: 7.1 G/DL (ref 13.3–17.7)
HOLD SPECIMEN: NORMAL
HOLD SPECIMEN: NORMAL
MCH RBC QN AUTO: 37.4 PG (ref 26.5–33)
MCHC RBC AUTO-ENTMCNC: 35.7 G/DL (ref 31.5–36.5)
MCV RBC AUTO: 105 FL (ref 78–100)
PLATELET # BLD AUTO: 116 10E3/UL (ref 150–450)
RBC # BLD AUTO: 1.9 10E6/UL (ref 4.4–5.9)
WBC # BLD AUTO: 9.7 10E3/UL (ref 4–11)

## 2022-09-29 PROCEDURE — 36415 COLL VENOUS BLD VENIPUNCTURE: CPT

## 2022-09-29 PROCEDURE — 85018 HEMOGLOBIN: CPT

## 2022-09-29 NOTE — PROGRESS NOTES
"Infusion Nursing Note:  Dillon Salter presents today for Labs and possible blood transfusion.    Patient seen by provider today: No    Note: Pt arrives ambulatory with Aunt to [facility]. Pt reports that he has had \"a pretty good 7-10 days\", with more energy.     /66   Pulse 99   Temp 97.4  F (36.3  C) (Oral)   Resp 18   SpO2 100%     Intravenous Access:  Peripheral IV placed in right AC.    Treatment Conditions:  Lab Results   Component Value Date    HGB 7.1 (L) 09/29/2022    WBC 9.7 09/29/2022    ANEU 9.7 (H) 09/15/2022    ANEUTAUTO 8.2 09/22/2022     (L) 09/29/2022      Lab Results   Component Value Date     (L) 09/29/2022    POTASSIUM 5.0 09/29/2022    MAG 2.1 08/14/2022    CR 0.99 09/29/2022    SARTHAK 9.1 09/29/2022    BILITOTAL 13.4 (H) 09/15/2022    ALBUMIN 3.1 (L) 09/15/2022    ALT 54 (H) 09/15/2022    AST 95 (H) 09/15/2022     Per blood therapy parameters, pt will NOT receive blood transfusion today.    Pt given copy of CBC results and updated on today's plan; pt very happy with results.    Post Infusion Assessment:  Site patent and intact, free from redness, edema or discomfort.  No evidence of extravasations.  Access discontinued per protocol.     Discharge Plan:   Patient discharged in stable condition accompanied by: Aunt.  Departure Mode: Ambulatory.  Pt has return appt scheduled for next Thursday; including visit with .      Lilibeth Reveles RN                      "

## 2022-09-29 NOTE — LETTER
PHYSICIAN ORDERS    DATE & TIME ISSUED: 2022 8:43 AM  PATIENT NAME: Dillon Salter   : 1995     [facility] MR# : [id number]     DIAGNOSIS:  cirrhosis  ICD-10 CODE: K70.31   Orders  1 year after issue.  Please disregard previous orders sent just prior.   Please have these drawn ASAP  These labs must be drawn all together on the same day when done:                   INR    PLEASE FAX RESULTS AS SOON AS POSSIBLE TO [phone], ATTN:LabDE    .

## 2022-09-29 NOTE — LETTER
PHYSICIAN ORDERS    DATE & TIME ISSUED: 2022 8:39 AM  PATIENT NAME: Dillon Salter   : 1995     [facility] MR# : [id number]     DIAGNOSIS:  cirrhosis  ICD-10 CODE: K70.31   Orders  1 year after issue.  Please have drawn as soon as possible to update care plan  These labs must be drawn all together on the same day when done:                 INR                 Hepatic panel                 BMP                 CBC w/ platelets                 Phosphatidylethanol (PEth)     PLEASE FAX RESULTS AS SOON AS POSSIBLE TO [phone], ATTN:M[place]geta    .

## 2022-09-29 NOTE — LETTER
10/4/2022    Dillon Salter  [address] [place] [place] [address]          Dear Dillon,    Thank you for your interest in the Transplant Center at [facility]. We look forward to being a part of your care team and assisting you through the transplant process.    As we discussed, your transplant coordinator is Deni Hicks Jr. (Liver).  You may call your coordinator at any time with questions or concerns at [phone].    Please complete the following.    1. Fill out and return the enclosed forms    Authorization for Electronic Communication    Authorization to Discuss Protected Health Information    Authorization for Release of Protected Health Information    Authorization for Care Everywhere Release of Information    2. Sign up for:    Zaya, access to your electronic medical record (see enclosed pamphlet)    [email or web address], a transplant education website    You can use these tools to learn more about your transplant, communicate with your care team, and track your medical details      Sincerely,      Solid Organ Transplant  [facility]    cc: Referring Physician and PCP

## 2022-09-30 ENCOUNTER — TELEPHONE (OUTPATIENT)
Dept: GASTROENTEROLOGY | Facility: CLINIC | Age: 27
End: 2022-09-30

## 2022-09-30 VITALS — WEIGHT: 135 LBS | BODY MASS INDEX: 22.49 KG/M2 | HEIGHT: 65 IN

## 2022-09-30 NOTE — TELEPHONE ENCOUNTER
Patient was asked the following questions during liver intake call.     Referring Provider: Self  Referring Diagnosis: Alcoholic Cirrhosis   PCP: Dr. Luiz Rodrigues     1)Do you know why you have liver disease: Yes       If Alcoholic Cirrhosis is present when was your last drink: 4/2022       Have you ever been through treatment for alcohol: Yes  2) Presence of Ascites: Yes Paracentesis: No  3) Presence of Hepatic Encephalopathy: Yes  Medications: Yes  4) History of GI Bleeding: Yes, esoph varices with banding  5) Oxygen Use: No  6) EGD: Yes  7) Colonoscopy: No   8) MELD Score: 26   9) Labs available for review from PCP/GI: Yes  10)HCC Diagnosis: No                11)Insurance information: [employer]                Policy Bah: Self              Subscriber/Policy/ID Number: [id number]             Group Number: [id number]    Referral intake process completed.  Patient is aware that after financial approval is received, medical records will be requested.   Patient confirmed for a callback from transplant coordinator on 10/6/22.  Tentative evaluation date TBD.    Confirmed coordinator will discuss evaluation process in more detail at the time of their call.   Patient is aware of the need to arrange age appropriate cancer screening, vaccinations, and dental care.  Reminded patient to complete questionnaire, complete medical records release, and review packet prior to evaluation visit .  Assessed patient for special needs (ie--wheelchair, assistance, guardian, and ):  None  Patient instructed to call [phone] with questions.     Patient gave verbal consent during intake call to obtain medical records and documents outside of [facility]:  Yes     YAMILKA Mejia, LPN       Solid Organ Transplant

## 2022-09-30 NOTE — TELEPHONE ENCOUNTER
Received a message from Momo Walters PA-C requesting writer reach out to pt and review the following message:    Labs look stable. Continue current plan.     I spoke to Dr. Wood and we are going to get you set up for evaluation for liver transplant in the next 4-6 weeks for your next follow-up.     You will meet with Dr. Wood/Dr. Jones as well as one of the social workers, dietitians and surgeons and the transplant coordinators.     This is to evaluate if you should be listed for liver transplant IN CASE your liver function does not continue to improve. This does not automatically mean you will be on the transplant list or need a transplant, but we want to start the process.     Someone from the transplant team will be reaching out to you about setting up those appointments.      Connected with pt and pt's mother, Leticia, to relay above message and address any additional questions or concerns. Pt and Leticia verbalized understanding and are agreeable to plan of care.

## 2022-10-06 ENCOUNTER — LAB (OUTPATIENT)
Dept: INFUSION THERAPY | Facility: CLINIC | Age: 27
End: 2022-10-06
Attending: INTERNAL MEDICINE
Payer: COMMERCIAL

## 2022-10-06 ENCOUNTER — ONCOLOGY VISIT (OUTPATIENT)
Dept: ONCOLOGY | Facility: CLINIC | Age: 27
End: 2022-10-06
Attending: INTERNAL MEDICINE
Payer: COMMERCIAL

## 2022-10-06 VITALS
WEIGHT: 134.2 LBS | RESPIRATION RATE: 18 BRPM | HEART RATE: 96 BPM | TEMPERATURE: 98.2 F | OXYGEN SATURATION: 99 % | SYSTOLIC BLOOD PRESSURE: 146 MMHG | DIASTOLIC BLOOD PRESSURE: 69 MMHG | BODY MASS INDEX: 22.33 KG/M2

## 2022-10-06 VITALS
DIASTOLIC BLOOD PRESSURE: 72 MMHG | HEART RATE: 103 BPM | RESPIRATION RATE: 18 BRPM | OXYGEN SATURATION: 98 % | SYSTOLIC BLOOD PRESSURE: 144 MMHG | TEMPERATURE: 97.8 F

## 2022-10-06 DIAGNOSIS — E83.111 HEMOCHROMATOSIS DUE TO REPEATED RED BLOOD CELL TRANSFUSIONS: Primary | ICD-10-CM

## 2022-10-06 DIAGNOSIS — D62 ACUTE POSTHEMORRHAGIC ANEMIA: Primary | ICD-10-CM

## 2022-10-06 DIAGNOSIS — K70.31 ALCOHOLIC CIRRHOSIS OF LIVER WITH ASCITES (H): Chronic | ICD-10-CM

## 2022-10-06 LAB
ABO/RH(D): NORMAL
ANTIBODY SCREEN: NEGATIVE
BASOPHILS # BLD AUTO: 0.1 10E3/UL (ref 0–0.2)
BASOPHILS NFR BLD AUTO: 1 %
BLD PROD TYP BPU: NORMAL
BLD PROD TYP BPU: NORMAL
BLOOD COMPONENT TYPE: NORMAL
BLOOD COMPONENT TYPE: NORMAL
CODING SYSTEM: NORMAL
CODING SYSTEM: NORMAL
CROSSMATCH: NORMAL
CROSSMATCH: NORMAL
EOSINOPHIL # BLD AUTO: 0.2 10E3/UL (ref 0–0.7)
EOSINOPHIL NFR BLD AUTO: 2 %
ERYTHROCYTE [DISTWIDTH] IN BLOOD BY AUTOMATED COUNT: 14 % (ref 10–15)
FERRITIN SERPL-MCNC: 2042 NG/ML (ref 31–409)
HCT VFR BLD AUTO: 19.1 % (ref 40–53)
HGB BLD-MCNC: 6.6 G/DL (ref 13.3–17.7)
IMM GRANULOCYTES # BLD: 0.1 10E3/UL
IMM GRANULOCYTES NFR BLD: 1 %
ISSUE DATE AND TIME: NORMAL
LYMPHOCYTES # BLD AUTO: 1.6 10E3/UL (ref 0.8–5.3)
LYMPHOCYTES NFR BLD AUTO: 17 %
MCH RBC QN AUTO: 36.9 PG (ref 26.5–33)
MCHC RBC AUTO-ENTMCNC: 34.6 G/DL (ref 31.5–36.5)
MCV RBC AUTO: 107 FL (ref 78–100)
MONOCYTES # BLD AUTO: 0.5 10E3/UL (ref 0–1.3)
MONOCYTES NFR BLD AUTO: 5 %
NEUTROPHILS # BLD AUTO: 7.3 10E3/UL (ref 1.6–8.3)
NEUTROPHILS NFR BLD AUTO: 74 %
NRBC # BLD AUTO: 0 10E3/UL
NRBC BLD AUTO-RTO: 0 /100
PLATELET # BLD AUTO: 126 10E3/UL (ref 150–450)
RBC # BLD AUTO: 1.79 10E6/UL (ref 4.4–5.9)
SPECIMEN EXPIRATION DATE: NORMAL
UNIT ABO/RH: NORMAL
UNIT ABO/RH: NORMAL
UNIT NUMBER: NORMAL
UNIT NUMBER: NORMAL
UNIT STATUS: NORMAL
UNIT STATUS: NORMAL
UNIT TYPE ISBT: 600
UNIT TYPE ISBT: 600
WBC # BLD AUTO: 9.9 10E3/UL (ref 4–11)

## 2022-10-06 PROCEDURE — 86901 BLOOD TYPING SEROLOGIC RH(D): CPT | Performed by: INTERNAL MEDICINE

## 2022-10-06 PROCEDURE — 99214 OFFICE O/P EST MOD 30 MIN: CPT | Performed by: INTERNAL MEDICINE

## 2022-10-06 PROCEDURE — 85025 COMPLETE CBC W/AUTO DIFF WBC: CPT | Performed by: INTERNAL MEDICINE

## 2022-10-06 PROCEDURE — 86923 COMPATIBILITY TEST ELECTRIC: CPT | Performed by: INTERNAL MEDICINE

## 2022-10-06 PROCEDURE — P9016 RBC LEUKOCYTES REDUCED: HCPCS | Performed by: INTERNAL MEDICINE

## 2022-10-06 PROCEDURE — 36415 COLL VENOUS BLD VENIPUNCTURE: CPT | Performed by: INTERNAL MEDICINE

## 2022-10-06 PROCEDURE — 82728 ASSAY OF FERRITIN: CPT | Performed by: INTERNAL MEDICINE

## 2022-10-06 PROCEDURE — 36430 TRANSFUSION BLD/BLD COMPNT: CPT

## 2022-10-06 PROCEDURE — G0463 HOSPITAL OUTPT CLINIC VISIT: HCPCS | Mod: 25

## 2022-10-06 RX ORDER — HEPARIN SODIUM (PORCINE) LOCK FLUSH IV SOLN 100 UNIT/ML 100 UNIT/ML
5 SOLUTION INTRAVENOUS
Status: CANCELLED | OUTPATIENT
Start: 2022-10-06

## 2022-10-06 RX ORDER — HEPARIN SODIUM,PORCINE 10 UNIT/ML
5 VIAL (ML) INTRAVENOUS
Status: CANCELLED | OUTPATIENT
Start: 2022-10-06

## 2022-10-06 RX ORDER — FUROSEMIDE 40 MG
TABLET ORAL
COMMUNITY
Start: 2022-09-04 | End: 2023-01-21

## 2022-10-06 ASSESSMENT — PAIN SCALES - GENERAL: PAINLEVEL: NO PAIN (0)

## 2022-10-06 NOTE — PROGRESS NOTES
Infusion Nursing Note:  Dillon Salter presents today for labs and one unit of rbcs.    Patient seen by provider today: Yes   present during visit today: Not Applicable.    Note: Pt. Slept most of his appointment.Denies any new changes or concerns.    Intravenous Access:  Labs drawn without difficulty.  Peripheral IV placed.    Treatment Conditions:  Results reviewed, labs MET treatment parameters, ok to proceed with treatment.    Post Infusion Assessment:  Patient tolerated infusion without incident.  Site patent and intact, free from redness, edema or discomfort.  Access discontinued per protocol.     Discharge Plan:   Patient and/or family verbalized understanding of discharge instructions and all questions answered.      Marely Hodgson RN

## 2022-10-06 NOTE — PROGRESS NOTES
DATE:  10/6/2022   TIME OF RECEIPT FROM LAB:  0934  LAB TEST:  hemoglobin  LAB VALUE:  6.6  RESULTS GIVEN WITH READ-BACK TO (PROVIDER): Dr. CELESTINO Riojas  TIME LAB VALUE REPORTED TO PROVIDER: 6268  Dr. Riojas requested that one unit of RBC's be transfused.

## 2022-10-06 NOTE — PROGRESS NOTES
"Oncology Rooming Note    October 6, 2022 9:18 AM   Dillon Salter is a 26 year old male who presents for: Follow up with Dr. Riojas, labs and possible blood transfusion.     Chief Complaint   Patient presents with     Hematology     Acute posthemorrhagic anemia     Initial Vitals: BP (!) 146/69   Pulse 96   Temp 98.2  F (36.8  C)   Resp 18   Wt 60.9 kg (134 lb 3.2 oz)   SpO2 99%   BMI 22.33 kg/m   Estimated body mass index is 22.33 kg/m  as calculated from the following:    Height as of 9/30/22: 1.651 m (5' 5\").    Weight as of this encounter: 60.9 kg (134 lb 3.2 oz). Body surface area is 1.67 meters squared.  No Pain (0) Comment: Data Unavailable   No LMP for male patient.  Allergies reviewed: Yes  Medications reviewed: Yes    Medications: Medication refills not needed today.  Pharmacy name entered into [facility]: [id number] [address]  AT [address]    Clinical concerns: No new c/o today.       Gwen Cohn RN            "

## 2022-10-06 NOTE — PROGRESS NOTES
[facility] Hematology and Oncology Progress Note    Patient: Dillon Salter  MRN: [id number]  Date of Service: Oct 6, 2022         Reason for Visit    Chief Complaint   Patient presents with     Hematology     Acute posthemorrhagic anemia       Assessment and Plan     Cancer Staging   No matching staging information was found for the patient.    ECOG Performance    2 - Ambulatory and independent in all ADLs; cannot work; up > 50% of the time     Pain  Pain Score: No Pain (0)      #.  Recurrent severe anemia on chronic anemia, initially acute blood loss secondary to variceal bleeding followed by mucosal oozing with friable gastric mucosa.  It is also compounded by anemia of chronic inflammation.  #.  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  Mild to moderate thrombocytopenia, secondary to liver disease  #.  Cirrhosis of the liver with portal hypertension, secondary to alcohol use  #.  Hepatic encephalopathy  #.  Autism  #.  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  Reviewed labs.  His hemoglobin was 6.6 g/dl.  His WBC has normalized.  Platelet count continues to improve.  He has persistent macrocytosis likely from liver disease.  He is asymptomatic from anemia.  He will receive 1 unit of packed RBC today, then he will skip next week for follow-up hemoglobin check, then he will resume weekly labs and possible transfusion in the following week.     Will continue with blood transfusion as needed for hemoglobin less than 6.5 g/dL. We will continue with conservative transfusion support and hopefully to limit blood transfusion no more than 1 time a week.   I recommended to check ferritin level today since he has received more than 20 units of packed RBC at this point.   Since his platelet count continues to improve and hemoglobin seems to be stabilizing, bone marrow biopsy is not clearly indicated at this point.  We will  continue to monitor.     Follow-up provider visit 2 months.    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 Dillon Salter presented today accompanied by his mother.  He reported his right foot is healed.  His last blood transfusion was 2 weeks ago on 9/22/2022.  He is overall feeling better.  He has been sober for 182 days.  He had a good visit with gastroenterology and thought about possible transplant if his liver function is not improved.  No bleeding.    Review of systems  Apart from describing in HPI, the remainder of comprehensive ROS was negative.    Past History    Past Medical History:   Diagnosis Date     Alcohol intoxication (H) 04/08/2022    .225 gm% 4/6/2022     Alcohol withdrawal, uncomplicated (H) 04/08/2022     Anemia due to blood loss, acute 04/06/2022     Anemia of chronic illness 04/08/2022     Anemia, unspecified type 05/24/2022     Benign essential hypertension      Bleeding gums 04/06/2022     Diabetes (H)      History of blood transfusion      Open abdominal wall wound 04/08/2022    Abdominal wall bleeding/coagulopathy       Past Surgical History:   Procedure Laterality Date     ESOPHAGOSCOPY, GASTROSCOPY, DUODENOSCOPY (EGD), COMBINED N/A 5/24/2022    Procedure: ESOPHAGOGASTRODUODENOSCOPY (EGD) with esophageal banding;  Surgeon: Gary Hurst MD;  Location: [facility] OR     ESOPHAGOSCOPY, GASTROSCOPY, DUODENOSCOPY (EGD), COMBINED N/A 6/20/2022    Procedure: ESOPHAGOGASTRODUODENOSCOPY;  Surgeon: Gavino Reza MD;  Location: [facility] Main OR     MIDLINE DOUBLE LUMEN PLACEMENT  5/26/2022           PICC TRIPLE LUMEN PLACEMENT  7/28/2022            Physical Exam    BP (!) 146/69   Pulse 96   Temp 98.2  F (36.8  C)   Resp 18   Wt 60.9 kg (134 lb 3.2 oz)   SpO2 99%   BMI 22.33 kg/m      General: alert, awake, not in acute distress, but looks tired.  HEENT: Head: Normal, normocephalic, atraumatic.  Eye: Icterus sclera.  Pharynx: Normal buccal mucosa. Normal pharynx.  Neck / Thyroid: Supple, no masses, nodes, nodules or enlargement.  Lymphatics: No abnormally enlarged lymph nodes.  Heart: S1 S2 RRR.   Abdomen: abdomen is soft without significant tenderness, masses, organomegaly or guarding  Extremities: normal strength, tone, and muscle mass  Skin:   CNS: non focal.    Lab Results    Recent Results (from the past 168 hour(s))   CBC with platelets and differential   Result Value Ref Range    WBC Count 9.9 4.0 - 11.0 10e3/uL    RBC Count 1.79 (L) 4.40 - 5.90 10e6/uL    Hemoglobin 6.6 (LL) 13.3 - 17.7 g/dL    Hematocrit 19.1 (L) 40.0 - 53.0 %     (H) 78 - 100 fL    MCH 36.9 (H) 26.5 - 33.0 pg    MCHC 34.6 31.5 - 36.5 g/dL    RDW 14.0 10.0 - 15.0 %    Platelet Count 126 (L) 150 - 450 10e3/uL    % Neutrophils 74 %    % Lymphocytes 17 %    % Monocytes 5 %    % Eosinophils 2 %    % Basophils 1 %    % Immature Granulocytes 1 %    NRBCs per 100 WBC 0 <1 /100    Absolute Neutrophils 7.3 1.6 - 8.3 10e3/uL    Absolute Lymphocytes 1.6 0.8 - 5.3 10e3/uL    Absolute Monocytes 0.5 0.0 - 1.3 10e3/uL    Absolute Eosinophils 0.2 0.0 - 0.7 10e3/uL    Absolute Basophils 0.1 0.0 - 0.2 10e3/uL    Absolute Immature Granulocytes 0.1 <=0.4 10e3/uL    Absolute NRBCs 0.0 10e3/uL   Ferritin   Result Value Ref Range    Ferritin 2,042 (H) 31 - 409 ng/mL   Adult Type and Screen   Result Value Ref Range    ABO/RH(D) A NEG     Antibody Screen Negative Negative    SPECIMEN EXPIRATION DATE [id number]    Prepare red blood cells (unit)   Result Value Ref Range    Blood Component Type Red Blood Cells     Product Code D7357M24      Unit Status Released     Unit Number [id number]     UNIT ABO/RH A-     CROSSMATCH Compatible     CODING SYSTEM [id number]     UNIT TYPE ISBT 0600    Prepare red blood cells (unit)   Result Value Ref Range    Blood Component Type Red Blood Cells     Product Code I5465W07     Unit Status Transfused     Unit Number [id number]     CROSSMATCH Compatible     CODING SYSTEM [id number]     ISSUE DATE AND TIME [id number]     UNIT ABO/RH A-     UNIT TYPE ISBT 0600        Imaging    No results found.      Signed by: Tez Riojas MD

## 2022-10-14 NOTE — TELEPHONE ENCOUNTER
October 14, 2022 11:43 AM - Deni Hicks Jr., RN:   Called patient (mother is only number we have) and LVM for her/him to call me back and discuss referral.    At this point will set up patient for Tuesday only in order to establish ROBERT requirements and needs, before moving forward with full evaluation.    Will set upfor 11/22/22    Below from Momo's last visit on 9/19/22:    Dillon Salter is a 26 year old male with history of ETOH cirrhosis, complicated by ascites/significant fluid overload improved with diuretics and hepatic encephalopathy, controlled with current regimen and history of variceal bleeding. Tbili has improved from 18.4 to 13.4 in the past month, remains primarily indirect, with Dbili of 3.0 and INR 1.73. MELD-Na 26. He is up to date with HCC Screening and variceal screening. He was congratulated on sobriety and we reviewed importance of moving forward with CD treatment should liver transplantation evaluation be considered.       # Ascites/anasarca, improved:    - Continue 1 mg bumex daily   - Continue 100 mg spironolactone daily      # History of alcohol abuse:   - Continue absolute sobriety   - Has completed ROBERT assessment. Discussed importance of starting treatment as recommended.      # Hepatic encephalopathy, improved:   - Continue lactulose, dose to 3-5 BM daily   - Continue rifaximin 550 mg twice daily      # HCC screening:   - US abdomen due in November 2022     # Anemia, hemolytic:   - Continue PRBC transfusion PRN with Hemoglobin < 6.5 with his   hematologist  - Frequent blood transfusion/early DM/ rule out HFE mutation      # Hyperkalemia, stable:   - labs drawn prior to diuretics   - Continue drinking 2 L fluid  - Continue optimization of of blood glucose      # Protein calorie malnutrition:   - Continue high protein diet ( gm daily)      # Deconditioning:   - Continue increasing physical activity as able      # Follow-up in clinic with Dr. Wood in 6-8 weeks to discuss      Momo  KATHERINE Walters   [facility]

## 2022-10-17 ENCOUNTER — LAB (OUTPATIENT)
Dept: INFUSION THERAPY | Facility: CLINIC | Age: 27
End: 2022-10-17
Attending: INTERNAL MEDICINE
Payer: COMMERCIAL

## 2022-10-17 DIAGNOSIS — K70.31 ALCOHOLIC CIRRHOSIS OF LIVER WITH ASCITES (H): ICD-10-CM

## 2022-10-17 RX ORDER — BUMETANIDE 1 MG/1
1 TABLET ORAL DAILY
Qty: 60 TABLET | Refills: 5 | Status: ON HOLD | OUTPATIENT
Start: 2022-10-17 | End: 2023-03-17

## 2022-10-27 ENCOUNTER — LAB (OUTPATIENT)
Dept: INFUSION THERAPY | Facility: CLINIC | Age: 27
End: 2022-10-27
Attending: INTERNAL MEDICINE
Payer: COMMERCIAL

## 2022-10-31 ENCOUNTER — CARE COORDINATION (OUTPATIENT)
Dept: BEHAVIORAL HEALTH | Facility: CLINIC | Age: 27
End: 2022-10-31

## 2022-10-31 ENCOUNTER — HOSPITAL ENCOUNTER (OUTPATIENT)
Dept: BEHAVIORAL HEALTH | Facility: CLINIC | Age: 27
Discharge: HOME | End: 2022-10-31
Attending: INTERNAL MEDICINE
Payer: COMMERCIAL

## 2022-10-31 DIAGNOSIS — K70.30 ALCOHOLIC CIRRHOSIS (H): ICD-10-CM

## 2022-10-31 DIAGNOSIS — K76.6 PORTAL HYPERTENSION (H): ICD-10-CM

## 2022-10-31 DIAGNOSIS — K70.31 ALCOHOLIC CIRRHOSIS OF LIVER WITH ASCITES (H): ICD-10-CM

## 2022-10-31 NOTE — PROGRESS NOTES
"At the request of Leticia (mom), Leticia Carranza and I met via phone 10/31 to review the recommendation/requirement he connect and enroll in an outpatient ROBERT treatment program virtually. This has been a challenge over the last number of months as he has had a number of ER visits and other conflicts which have prevented follow through.     According to Dillon and mom, he has been sober since April of 2022 and is willing to commit to following up with ROBERT treatment now. I affirmed his sobriety and suggested they contact their insurance provider for recommendations to first get an updated ROBERT Assessment scheduled with a covered provider and coordinate entry into an ROBERT treatment program with that same provider. The updated assessment is necessary since his last one is well over 45 days old (7/11/22 see EMR) and doing the assessment and setting up virtual treatment with the same provider is \"easier\" than using multiple ROBERT providers. They agreed.     Leticia said she would be making that call and setting up the assessment beginning 11/1 and would keep me posted. I said I would update Dillon's chart with any information they share with me.     Ashkan Singleton, ARMONDC   "

## 2022-11-07 ENCOUNTER — INFUSION THERAPY VISIT (OUTPATIENT)
Dept: INFUSION THERAPY | Facility: CLINIC | Age: 27
End: 2022-11-07
Attending: INTERNAL MEDICINE
Payer: COMMERCIAL

## 2022-11-07 VITALS
TEMPERATURE: 98.1 F | RESPIRATION RATE: 16 BRPM | SYSTOLIC BLOOD PRESSURE: 139 MMHG | HEART RATE: 92 BPM | DIASTOLIC BLOOD PRESSURE: 76 MMHG | OXYGEN SATURATION: 97 %

## 2022-11-07 DIAGNOSIS — D62 ACUTE POSTHEMORRHAGIC ANEMIA: Primary | ICD-10-CM

## 2022-11-07 DIAGNOSIS — D62 ACUTE POSTHEMORRHAGIC ANEMIA: ICD-10-CM

## 2022-11-07 LAB
ABO/RH(D): NORMAL
ANTIBODY SCREEN: NEGATIVE
BLD PROD TYP BPU: NORMAL
BLOOD COMPONENT TYPE: NORMAL
CODING SYSTEM: NORMAL
CROSSMATCH: NORMAL
ERYTHROCYTE [DISTWIDTH] IN BLOOD BY AUTOMATED COUNT: 12.5 % (ref 10–15)
HCT VFR BLD AUTO: 18.9 % (ref 40–53)
HGB BLD-MCNC: 6.5 G/DL (ref 13.3–17.7)
ISSUE DATE AND TIME: NORMAL
MCH RBC QN AUTO: 37.6 PG (ref 26.5–33)
MCHC RBC AUTO-ENTMCNC: 34.4 G/DL (ref 31.5–36.5)
MCV RBC AUTO: 109 FL (ref 78–100)
PLATELET # BLD AUTO: 125 10E3/UL (ref 150–450)
RBC # BLD AUTO: 1.73 10E6/UL (ref 4.4–5.9)
SPECIMEN EXPIRATION DATE: NORMAL
UNIT ABO/RH: NORMAL
UNIT NUMBER: NORMAL
UNIT STATUS: NORMAL
UNIT TYPE ISBT: 600
WBC # BLD AUTO: 12 10E3/UL (ref 4–11)

## 2022-11-07 PROCEDURE — P9016 RBC LEUKOCYTES REDUCED: HCPCS | Performed by: INTERNAL MEDICINE

## 2022-11-07 PROCEDURE — 36430 TRANSFUSION BLD/BLD COMPNT: CPT

## 2022-11-07 PROCEDURE — 999N000285 HC STATISTIC VASC ACCESS LAB DRAW WITH PIV START

## 2022-11-07 PROCEDURE — 36415 COLL VENOUS BLD VENIPUNCTURE: CPT

## 2022-11-07 PROCEDURE — 36415 COLL VENOUS BLD VENIPUNCTURE: CPT | Performed by: INTERNAL MEDICINE

## 2022-11-07 PROCEDURE — 86901 BLOOD TYPING SEROLOGIC RH(D): CPT | Performed by: INTERNAL MEDICINE

## 2022-11-07 PROCEDURE — 86850 RBC ANTIBODY SCREEN: CPT | Performed by: INTERNAL MEDICINE

## 2022-11-07 PROCEDURE — 86923 COMPATIBILITY TEST ELECTRIC: CPT | Performed by: INTERNAL MEDICINE

## 2022-11-07 PROCEDURE — 999N000127 HC STATISTIC PERIPHERAL IV START W US GUIDANCE

## 2022-11-07 PROCEDURE — 85027 COMPLETE CBC AUTOMATED: CPT

## 2022-11-07 RX ORDER — HEPARIN SODIUM,PORCINE 10 UNIT/ML
5 VIAL (ML) INTRAVENOUS
Status: CANCELLED | OUTPATIENT
Start: 2022-11-07

## 2022-11-07 RX ORDER — HEPARIN SODIUM (PORCINE) LOCK FLUSH IV SOLN 100 UNIT/ML 100 UNIT/ML
5 SOLUTION INTRAVENOUS
Status: CANCELLED | OUTPATIENT
Start: 2022-11-07

## 2022-11-07 NOTE — PROGRESS NOTES
Infusion Nursing Note:  Dillon Salter presents today for 1 unit PRBC.    Patient seen by provider today: No   present during visit today: Not Applicable.    Note: Pt arrives to infusion today feeling well. Denies fever, chills, cough, sob, dizziness or any other new complaints or concerns.     Intravenous Access:  Peripheral IV placed by PICC team.    Treatment Conditions:  Lab Results   Component Value Date    HGB 6.5 (LL) 11/07/2022    WBC 12.0 (H) 11/07/2022    ANEU 9.7 (H) 09/15/2022    ANEUTAUTO 6.1 10/27/2022     (L) 11/07/2022      Results reviewed, labs MET treatment parameters, ok to proceed with treatment.  Blood transfusion consent signed 6/27/22.    Post Infusion Assessment:  Patient tolerated infusion without incident.  Blood return noted pre and post infusion.  Site patent and intact, free from redness, edema or discomfort.  No evidence of extravasations.  Access discontinued per protocol.     Discharge Plan:   Patient will return 11/14 for next appointment.   Patient discharged in stable condition accompanied by: self and mother.  Departure Mode: Ambulatory.      Zayda Walker RN

## 2022-11-18 DIAGNOSIS — K76.82 HEPATIC ENCEPHALOPATHY (H): Primary | ICD-10-CM

## 2022-11-18 DIAGNOSIS — K72.10 END STAGE LIVER DISEASE (H): ICD-10-CM

## 2022-11-18 RX ORDER — LACTULOSE 10 G/15ML
10-20 SOLUTION ORAL 2 TIMES DAILY
Qty: 1800 ML | Refills: 11 | Status: ON HOLD | OUTPATIENT
Start: 2022-11-18 | End: 2023-01-29

## 2022-11-21 ENCOUNTER — LAB (OUTPATIENT)
Dept: INFUSION THERAPY | Facility: CLINIC | Age: 27
End: 2022-11-21
Attending: INTERNAL MEDICINE
Payer: COMMERCIAL

## 2022-11-21 ENCOUNTER — TELEPHONE (OUTPATIENT)
Dept: TRANSPLANT | Facility: CLINIC | Age: 27
End: 2022-11-21

## 2022-11-21 VITALS
SYSTOLIC BLOOD PRESSURE: 147 MMHG | DIASTOLIC BLOOD PRESSURE: 87 MMHG | HEART RATE: 96 BPM | OXYGEN SATURATION: 100 % | TEMPERATURE: 98.8 F

## 2022-11-21 DIAGNOSIS — D64.9 ANEMIA, UNSPECIFIED TYPE: Primary | ICD-10-CM

## 2022-11-21 DIAGNOSIS — K70.31 ALCOHOLIC CIRRHOSIS OF LIVER WITH ASCITES (H): ICD-10-CM

## 2022-11-21 DIAGNOSIS — D62 ACUTE POSTHEMORRHAGIC ANEMIA: Primary | ICD-10-CM

## 2022-11-21 DIAGNOSIS — K76.6 PORTAL HYPERTENSION (H): ICD-10-CM

## 2022-11-21 LAB
BASOPHILS # BLD MANUAL: 0 10E3/UL (ref 0–0.2)
BASOPHILS NFR BLD MANUAL: 0 %
EOSINOPHIL # BLD MANUAL: 0.2 10E3/UL (ref 0–0.7)
EOSINOPHIL NFR BLD MANUAL: 2 %
ERYTHROCYTE [DISTWIDTH] IN BLOOD BY AUTOMATED COUNT: 14.5 % (ref 10–15)
HCT VFR BLD AUTO: 22.3 % (ref 40–53)
HGB BLD-MCNC: 7.8 G/DL (ref 13.3–17.7)
LYMPHOCYTES # BLD MANUAL: 1.5 10E3/UL (ref 0.8–5.3)
LYMPHOCYTES NFR BLD MANUAL: 18 %
MCH RBC QN AUTO: 37.5 PG (ref 26.5–33)
MCHC RBC AUTO-ENTMCNC: 35 G/DL (ref 31.5–36.5)
MCV RBC AUTO: 107 FL (ref 78–100)
METAMYELOCYTES # BLD MANUAL: 0.1 10E3/UL
METAMYELOCYTES NFR BLD MANUAL: 1 %
MONOCYTES # BLD MANUAL: 0.3 10E3/UL (ref 0–1.3)
MONOCYTES NFR BLD MANUAL: 4 %
NEUTROPHILS # BLD MANUAL: 6.4 10E3/UL (ref 1.6–8.3)
NEUTROPHILS NFR BLD MANUAL: 75 %
PLAT MORPH BLD: ABNORMAL
PLATELET # BLD AUTO: 127 10E3/UL (ref 150–450)
POLYCHROMASIA BLD QL SMEAR: SLIGHT
RBC # BLD AUTO: 2.08 10E6/UL (ref 4.4–5.9)
RBC MORPH BLD: ABNORMAL
WBC # BLD AUTO: 8.5 10E3/UL (ref 4–11)

## 2022-11-21 PROCEDURE — 85007 BL SMEAR W/DIFF WBC COUNT: CPT | Performed by: INTERNAL MEDICINE

## 2022-11-21 PROCEDURE — 85027 COMPLETE CBC AUTOMATED: CPT | Performed by: INTERNAL MEDICINE

## 2022-11-21 PROCEDURE — 36415 COLL VENOUS BLD VENIPUNCTURE: CPT | Performed by: INTERNAL MEDICINE

## 2022-11-21 RX ORDER — HEPARIN SODIUM (PORCINE) LOCK FLUSH IV SOLN 100 UNIT/ML 100 UNIT/ML
5 SOLUTION INTRAVENOUS
Status: CANCELLED | OUTPATIENT
Start: 2022-11-21

## 2022-11-21 RX ORDER — HEPARIN SODIUM,PORCINE 10 UNIT/ML
5 VIAL (ML) INTRAVENOUS
Status: CANCELLED | OUTPATIENT
Start: 2022-11-21

## 2022-11-21 ASSESSMENT — PAIN SCALES - GENERAL: PAINLEVEL: NO PAIN (0)

## 2022-11-21 NOTE — TELEPHONE ENCOUNTER
Mother Leticia calling asking to cancel Dillon's Pre Liver eval for Tues Nov 22, as she didn't know what date coord was going to schedule this for, as her and Shelton have never talked.  Please call back.

## 2022-11-21 NOTE — PROGRESS NOTES
Pt arrives for labs and possible blood transfusion. Hgb 7.8 today. Does not meet parameters for blood transfusion. Pt states he is feeling well and has no new concerns or complaints. Pt was discharged ambulatory.

## 2022-11-28 ENCOUNTER — LAB (OUTPATIENT)
Dept: INFUSION THERAPY | Facility: CLINIC | Age: 27
End: 2022-11-28
Attending: INTERNAL MEDICINE
Payer: COMMERCIAL

## 2022-11-29 NOTE — TELEPHONE ENCOUNTER
LVM for patient/mom about coming here to start on 12/20. [employer] message sent as well.    [facility] get [facility] set up.

## 2022-11-29 NOTE — TELEPHONE ENCOUNTER
LVM for patient/mom about coming here to start on 12/20. [facility] message sent as well.    [facility] sent to [facility] to get this set up.

## 2022-11-30 NOTE — TELEPHONE ENCOUNTER
Playing phone tag with patient/mom.    She LVM for me last night stating she spoke with ann marie and has 12/20 on the calendar to come start evaluation    Also has phone call with Kim on 12/7 for SW assessment and help patient get CD treatment set up. Mom reports trying, but not able to get patient in for this/that reason.    I did put order in for ROBERT assessment as it appears from chart review that he had one done in July, but given elapsed time of greater than 45 days, patient will need a new one if he has not already.    I return call just now and LVM as well

## 2022-12-13 NOTE — PROGRESS NOTES
[facility] Hepatology    Referring provider:  Momo Walters  Chief complaint:  LT Eval, A negative    Assessment  27 year old male with PMHx of decompensated alcohol related cirrhosis complicated by ascites, hepatic encephalopathy and history of variceal bleeding (5/2022). PMHx also includes autism spectrum disorder, type II diabetes, depression and alcohol use disorder.    In terms of the patient's decompensated alcohol-related cirrhosis his ascites is well managed with low-dose diuretics and his hepatic encephalopathy is well managed with maximal medical therapy.  He has a history of variceal bleeding but has not had any issues since May 2022 and is up-to-date on his variceal surveillance. His MELD-Na is 20.  His MELD-Na is partially driven by an element of spur cell anemia.    With regards to his alcohol use disorder his last use was April 6, 2022.  He has not undergone alcohol-related treatment programming following his last use and would benefit from substance use assessment and programming.    Furthermore the patient is frail and would benefit from physical therapy, occupational therapy and nutrition optimization.  He also has poor fasting blood sugar management on his current doses of long-acting insulin and would benefit from endocrinology referral.     Plan  - Follow up iron studies + ferritin  - Chemical dependency assessment and programming  - PT and OT given weakness  - CT angiogram  - PFTs given history of tobacco use  - Endocrine referral given poor diabetes control based on reported AM blood sugars  - Nutrition optimization; information provided from [email or web address] and printed  - Mental health referral    RTC: 3 months    Discussed with attending hepatologist, Dr. Israel Jones MD  Transplant Hepatology Fellow  [phone]    HPI:  EtOH related Cirrhosis  - hx HE  - hx ascites  - hx variceal bleed (5/2022)  - last EGD 6/2022: grade I varices, healing banding ulcers  - HCC screening:  US 5/2022 - no hepatic lesions    The patient is accompanied by his mother Stephanie.    He reports being diagnosed with liver disease in April 2022 in the setting of variceal bleeding.  He reports that since April he has not had any issues with melena, hematochezia or hematemesis.  He has had intermittent issues with epistaxis as well as gum bleeding.  He denies any significant bleeding since September 2022; however he does have intermittent small amounts of epistaxis and gum bleeding.  Of note he has undergone over 20 units of packed red blood cell transfusions for hemolytic anemia.  His ferritin was noted to be in the 2000's in October 2022.  He underwent hemochromatosis testing which was negative - C282Y normal, H63D normal, S65D normal.    In terms of his fluid management he has never undergone a paracentesis.  His fluid and ascites is managed with 1 mg of Bumex daily and spironolactone 100 mg daily.  He denies any lower extremity edema or abdominal swelling at this time.    In terms of his hepatic encephalopathy he was noted to have confusion as early as May 2022.  He has been adherent to his lactulose and rifaximin however the family has down titrated his lactulose to as needed given that he has been having 3-4 bowel movements per day.  The family reports that his last episode of confusion that they can remember was back in September 2022.    In terms of his alcohol use, he started using alcohol at the age of 19.  He would drink beer, wine and Bacardi.  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  Prior to quitting in April 2022 he was drinking 1 case of beer per day.  He previously went to treatment in 2018 in [place] for 24 days.  Following completion of that program he was sober for 18 days and then return to use. Last alcohol use: 4/6/2022    In terms of his diabetes management he is currently on 20 units of glargine twice daily. Last A1c  5.4% 7/2022, however his mom reports that his fasting blood sugars have been consistently over 200.    In terms of his anxiety and depression he is currently on Prozac.  He has never engaged in one-on-one psychotherapy.    He presented in a wheelchair today.  He denies any assistive devices at home for mobility.  He has a very slow gait.  He has difficulty holding up a fork and knife due to weakness.    COVID Vaccination: s/p 3 doses  Medical hx Surgical hx   Past Medical History:   Diagnosis Date     Acute on chronic anemia 04/08/2022     Alcohol use disorder      Alcoholic cirrhosis of liver with ascites (H)      Autism spectrum      Benign essential hypertension      Hepatic encephalopathy      Major depressive disorder      Type II Diabetes     Past Surgical History:   Procedure Laterality Date     ESOPHAGOSCOPY, GASTROSCOPY, DUODENOSCOPY (EGD), COMBINED N/A 5/24/2022    Procedure: ESOPHAGOGASTRODUODENOSCOPY (EGD) with esophageal banding;  Surgeon: Gary Hurst MD;  Location: [facility] OR     ESOPHAGOSCOPY, GASTROSCOPY, DUODENOSCOPY (EGD), COMBINED N/A 6/20/2022    Procedure: ESOPHAGOGASTRODUODENOSCOPY;  Surgeon: Gavino Reza MD;  Location: [facility] Main OR     MIDLINE DOUBLE LUMEN PLACEMENT  5/26/2022          PICC TRIPLE LUMEN PLACEMENT  7/28/2022        Abdominal Surgeries: Denies       Medications  Current Outpatient Medications   Medication Sig Dispense Refill     alcohol swab prep pads Use to swab area of injection/marsha as directed. 100 each 3     blood glucose (NO BRAND SPECIFIED) test strip Use to test blood sugar 4 times daily or as directed. To accompany: Blood Glucose Monitor Brands: per insurance. 100 strip 6     blood glucose monitoring (NO BRAND SPECIFIED) meter device kit Use to test blood sugar 4 times daily or as directed. Preferred blood glucose meter OR supplies to accompany: Blood Glucose Monitor Brands: per insurance. 1 kit 0     bumetanide (BUMEX) 1 MG tablet  Take 1 tablet (1 mg) by mouth daily 60 tablet 5     fluconazole (DIFLUCAN) 150 MG tablet Take 150 mg by mouth daily as needed Patient to use for 7 days if yeast infection flare up.       FLUoxetine (PROZAC) 20 MG capsule        folic acid (FOLVITE) 1 MG tablet Take 1 tablet (1 mg) by mouth daily 90 tablet 1     gabapentin (NEURONTIN) 100 MG capsule        hydrOXYzine (ATARAX) 25 MG tablet Take 25 mg by mouth       insulin aspart (NOVOLOG FLEXPEN) 100 UNIT/ML pen 1 unit per 10 grams of carb, plus additional units based on following scale   For Pre-Meal  - 164 give 1 unit. For Pre-Meal  - 189 give 2 units. For Pre-Meal  - 214 give 3 units. For Pre-Meal  - 239 give 4 units. For Pre-Meal  - 264 give 5 units. For Pre-Meal  - 289 give 6 units. For Pre-Meal  - 314 give 7 units. For Pre-Meal  - 339 give 8 units. For Pre-Meal  - 364 give 9 units.  For Pre-Meal BG greater than or equal to 365 give 10 units 15 mL 3     insulin glargine (LANTUS PEN) 100 UNIT/ML pen Inject 20 Units Subcutaneous 2 times daily 15 mL 0     insulin pen needle (ULTICARE MICRO) 32G X 4 MM miscellaneous Use pen needles daily or as directed. 100 each 3     lactulose (CHRONULAC) 10 GM/15ML solution Take 15-30 mLs (10-20 g) by mouth 2 times daily 1800 mL 11     magnesium oxide 400 MG CAPS        multivitamin, therapeutic (THERA-VIT) TABS tablet Take 1 tablet by mouth in the morning.       oxyCODONE (ROXICODONE) 5 MG tablet Take 5 mg by mouth every 4 hours as needed for severe pain       pantoprazole (PROTONIX) 40 MG EC tablet Take 1 tablet (40 mg) by mouth daily 30 tablet 3     rifaximin (XIFAXAN) 550 MG TABS tablet Take 1 tablet (550 mg) by mouth 2 times daily 60 tablet 0     spironolactone (ALDACTONE) 100 MG tablet Take 1 tablet (100 mg) by mouth daily 60 tablet 5     thiamine (B-1) 100 MG tablet Take 1 tablet (100 mg) by mouth in the morning. 30 tablet 0     thin (NO BRAND SPECIFIED) lancets Use  "with lanceting device. To accompany: Blood Glucose Monitor Brands: per insurance. 100 each 6     furosemide (LASIX) 40 MG tablet  (Patient not taking: Reported on 12/20/2022)       Allergies  No Known Allergies    Family hx Social hx   Family History   Problem Relation Age of Onset     Hypertension Mother      Substance Abuse Mother      Thyroid Disease Mother      Heart Failure Father      Substance Abuse Father      Chronic Obstructive Pulmonary Disease Father      Substance Abuse Maternal Grandfather    No known family history of liver disease.   - Employment: Not currently working.  Previously worked in the [employer] industry  - Graduated high school  - Lives with mother and father, Tracy  - Alcohol: as above  - Tobacco: Former, quit ?5 years ago  - Drugs: Denies     Review of systems  A 10-point review of systems was negative.    Examination  BP (!) 140/87   Pulse 91   Ht 1.651 m (5' 5\")   Wt 60.8 kg (134 lb)   SpO2 100%   BMI 22.30 kg/m    Body mass index is 22.3 kg/m .    Gen-NAD  Eye- EOMI, conjunctival icterus, temporal wasting  ENT- MMM, normal oropharynx  CVS- RRR, no murmurs  RS- CTA bilaterally  Abd-soft, nontender, nondistended  Extr- 2+ radial pulses bilaterally, no lower extremity edema bilaterally  MS- hands without clubbing  Neuro- A+Ox3, no asterixis but tremulous against gravity  Skin- no rash. + jaundice  Psych- normal mood    Laboratory  BMPRecent Labs   Lab Test 12/20/22  0703 11/28/22  1119 09/29/22  0936 09/22/22  0933   * 133* 128* 132*   POTASSIUM 4.6 4.4 5.0 5.3*   CHLORIDE 97* 95* 91* 95*   SARTHAK 10.1* 9.5 9.1 9.2   CO2 29 31 26 26   BUN 30.7* 20 37* 29*   CR 0.86 0.82 0.99 0.91   * 209* 239* 230*     CBC  Recent Labs   Lab Test 12/20/22  0703 11/28/22  1119 11/21/22  1133 11/07/22  1126   WBC 9.8 10.2 8.5 12.0*   RBC 2.53* 2.17* 2.08* 1.73*   HGB 9.3* 8.1* 7.8* 6.5*   HCT 26.6* 23.3* 22.3* 18.9*   * 107* 107* 109*   MCH 36.8* 37.3* 37.5* 37.6*   MCHC 35.0 " 34.8 35.0 34.4   RDW 13.1 14.5 14.5 12.5   * 138* 127* 125*     Liver Enzymes   Recent Labs   Lab Test 12/20/22  0703   PROTTOTAL 8.5*   ALBUMIN 3.9   BILITOTAL 9.7*   ALKPHOS 165*   AST 80*   ALT 39      INR   INR   Date Value Ref Range Status   12/20/2022 1.44 (H) 0.85 - 1.15 Final      Hep B s Ag negative  Hep B c Ab negative  Hep B s Ab > 1000  Hep C Ab negative  HFE testing: C282Y normal, H63D normal, S65D normal.    Radiology  CXR 12/2022  No acute air space disease.    Abdominal US 12/2022  1. Hepatomegaly with cirrhotic morphology. No focal lesion demonstrated.  2. Sequela of portal hypertension including recanalized paraumbilical vein and splenomegaly.  3. Patent Doppler evaluation of the hepatic vasculature.    TTE 7/2022  Left ventricular size, wall motion and function are normal. The ejection fraction is 60-65%.  Normal right ventricle size and systolic function.  No hemodynamically significant valvular abnormalities on 2D or color flow imaging.    Endoscopy  EGD 6/2022: Portal hypertensive gastropathy. Grade I varices with healing banding ulcers.

## 2022-12-19 NOTE — PROGRESS NOTES
"[facility] Solid Organ Transplant  Outpatient MNT: Liver Transplant Evaluation    Current BMI: 22.3 (HT 65 in,  lbs/61 kg)  BMI is within recommendation of <45 for liver transplant    Fried Frailty-- Frail (5/5 points)    FraiLT-- Frail  [email or web address]    Recommended Interventions to Address Frailty:  - Needs PT referral (pt prefers in-home)  - Needs to drink protein shakes      Time Spent: 30 minutes  Visit Type: Initial   Referring Physician: Alcon   Pt accompanied by: his parents, Wes & Leticia     History of previous txp: none     Nutrition Assessment  H/o DM II, etoh liver dz     - Appetite: up/down; sleeps a lot- impacts eating    - Food allergies/intolerances: no   - Living situation: w/ parents   - Meal prep & grocery shopping: parents do  - Previous RD education: no   - Issues chewing or swallowing: no   - N/V/D/C: no   - Food access concerns: not asked     Vitamins, Supplements, Pertinent Meds: MVI, vit B1  Herbal Medicines/Supplements: none   Protein supplement: they are available at home, but he doesn't drink them     Edema: none     Weight hx: lost 100 lbs when he got sick/stopped drinking (115 lbs-->lowest); highest wt 240 lbs (1-2 years ago)  Wt Readings from Last 10 Encounters:   12/20/22 60.8 kg (134 lb)   10/06/22 60.9 kg (134 lb 3.2 oz)   09/30/22 61.2 kg (135 lb)   09/19/22 61.2 kg (135 lb)   09/01/22 60.6 kg (133 lb 8 oz)   08/22/22 84.9 kg (187 lb 3.2 oz)   08/16/22 75.4 kg (166 lb 3.2 oz)   08/04/22 69.1 kg (152 lb 6.4 oz)   08/02/22 67.6 kg (149 lb)   07/11/22 59 kg (130 lb)     Diet Recall- eats 2x/day \"not enough\"  Chicken, eggs, waffles, pork chops, chili, less f/v now in the winter   Yuly- apple juice (30 oz/day), skim milk (8 cups/day), less water in the winter, sporadic hot tea, Gatorade (8 oz/day)  Eats peanut butter, some hamburger, not much starch (rice), PB toast  Snacks- mini muffins, junk food   Dining out- 2x/week- [employer]- chicken & cheese " "seble Gordon     Physical Activity  Stretches, light weights-->on his own/not via PT (2-3x/week, started this 1 month ago-- weights are \"collecting dust\") so question how frequent he is actually doing these activities.     Does some things around the house, such as taking out trash, getting mail  Lives w/ parents in split level; does stairs very slowly     Malnutrition  % Intake: difficult to assess   % Weight Loss: difficult to assess w/ fluid fluctuations   Subcutaneous Fat Loss: Severe   Muscle Loss: Severe   Fluid Accumulation/Edema: None noted  Malnutrition Diagnosis: Severe malnutrition in the context of chronic illness    Anthropometrics  Dosing wt: 134 lbs/61 kg    Estimated Nutrition Needs   Protein: 61-73-92 grams/day of protein (1-1.2-1.5 g/kg) for increased needs with liver disease, frail status    Nutrition Diagnosis  Inadequate protein-energy intake r/t increased needs with liver disease, inadequate PO intake (in part d/t sleeping regimen) AEB frail status x 2, severe muscle loss.     Nutrition Intervention  Nutrition education provided:  Discussed sodium intake (low sodium foods and drinks, seasoning food without salt and tips for low sodium diet).    Reviewed adequate protein intake. Encouraged receiving protein from both animal and plant based sources. Focused majority of visit on getting adequate protein on a consistent basis combined with PT to help improve mobility, functional status, etc.   Encouraged at least 1 protein supplement/day (or more). Reviewed high protein foods, including protein-rich snacks, protein between sleeping, etc. Consider mixing unflavored protein powder into apple juice.     Reviewed post txp diet guidelines in brief (will review in further detail post txp):  (1) Review of proper food safety measures d/t immunosuppressant therapy post-op and increased risk for food-borne illness    (2) Avoid the following post txp d/t risk for rejection, unknown effects on the " organs, and/or potential interactions with immunosuppressants:  - Herbal, Chinese, holistic, chiropractic, natural, alternative medicines and supplements  - Detoxes and cleanses  - Weight loss pills  - Protein powders or other products with extracts or herbs (ie green tea extract)    (3) Med regimen and possible side effects    Patient Understanding: Pt verbalized understanding of education provided.  Expected Engagement: Good  Follow-Up Plans: my chart check in 1-2 months     Nutrition Goals  1. Ideal minimum protein intake 61 g/day  2. Start working with in-home PT to improve functional status     La Nena Whitley, RD, LD, CCTD

## 2022-12-20 ENCOUNTER — ANCILLARY PROCEDURE (OUTPATIENT)
Dept: ULTRASOUND IMAGING | Facility: CLINIC | Age: 27
End: 2022-12-20
Attending: INTERNAL MEDICINE
Payer: COMMERCIAL

## 2022-12-20 ENCOUNTER — OFFICE VISIT (OUTPATIENT)
Dept: GASTROENTEROLOGY | Facility: CLINIC | Age: 27
End: 2022-12-20
Attending: INTERNAL MEDICINE
Payer: COMMERCIAL

## 2022-12-20 ENCOUNTER — ALLIED HEALTH/NURSE VISIT (OUTPATIENT)
Dept: TRANSPLANT | Facility: CLINIC | Age: 27
End: 2022-12-20
Attending: INTERNAL MEDICINE
Payer: COMMERCIAL

## 2022-12-20 ENCOUNTER — COMMITTEE REVIEW (OUTPATIENT)
Dept: TRANSPLANT | Facility: CLINIC | Age: 27
End: 2022-12-20

## 2022-12-20 ENCOUNTER — LAB (OUTPATIENT)
Dept: LAB | Facility: CLINIC | Age: 27
End: 2022-12-20
Attending: INTERNAL MEDICINE
Payer: COMMERCIAL

## 2022-12-20 ENCOUNTER — ANCILLARY PROCEDURE (OUTPATIENT)
Dept: GENERAL RADIOLOGY | Facility: CLINIC | Age: 27
End: 2022-12-20
Attending: INTERNAL MEDICINE
Payer: COMMERCIAL

## 2022-12-20 VITALS
OXYGEN SATURATION: 100 % | SYSTOLIC BLOOD PRESSURE: 140 MMHG | HEIGHT: 65 IN | WEIGHT: 134 LBS | HEART RATE: 91 BPM | DIASTOLIC BLOOD PRESSURE: 87 MMHG | BODY MASS INDEX: 22.33 KG/M2

## 2022-12-20 DIAGNOSIS — K76.6 PORTAL HYPERTENSION (H): ICD-10-CM

## 2022-12-20 DIAGNOSIS — K70.31 ALCOHOLIC CIRRHOSIS OF LIVER WITH ASCITES (H): Primary | ICD-10-CM

## 2022-12-20 DIAGNOSIS — K70.31 ALCOHOLIC CIRRHOSIS OF LIVER WITH ASCITES (H): ICD-10-CM

## 2022-12-20 DIAGNOSIS — E11.69 DIABETES MELLITUS TYPE 2 IN OBESE: Chronic | ICD-10-CM

## 2022-12-20 DIAGNOSIS — D64.9 ANEMIA, UNSPECIFIED TYPE: ICD-10-CM

## 2022-12-20 DIAGNOSIS — E66.9 DIABETES MELLITUS TYPE 2 IN OBESE: Chronic | ICD-10-CM

## 2022-12-20 DIAGNOSIS — K70.30 ALCOHOLIC CIRRHOSIS (H): ICD-10-CM

## 2022-12-20 DIAGNOSIS — F41.9 ANXIETY: ICD-10-CM

## 2022-12-20 DIAGNOSIS — K76.82 HEPATIC ENCEPHALOPATHY (H): ICD-10-CM

## 2022-12-20 DIAGNOSIS — E55.9 VITAMIN D DEFICIENCY: ICD-10-CM

## 2022-12-20 DIAGNOSIS — E11.69 TYPE 2 DIABETES MELLITUS WITH OTHER SPECIFIED COMPLICATION, WITHOUT LONG-TERM CURRENT USE OF INSULIN (H): ICD-10-CM

## 2022-12-20 DIAGNOSIS — I85.01 IDIOPATHIC ESOPHAGEAL VARICES WITH BLEEDING (H): ICD-10-CM

## 2022-12-20 PROBLEM — E87.5 HYPERKALEMIA: Status: RESOLVED | Noted: 2022-07-28 | Resolved: 2022-12-20

## 2022-12-20 PROBLEM — K70.10 ALCOHOLIC HEPATITIS (H): Status: RESOLVED | Noted: 2022-05-09 | Resolved: 2022-12-20

## 2022-12-20 PROBLEM — E87.1 HYPONATREMIA: Status: RESOLVED | Noted: 2022-07-28 | Resolved: 2022-12-20

## 2022-12-20 LAB
ALBUMIN MFR UR ELPH: <6 MG/DL
ALBUMIN SERPL BCG-MCNC: 3.9 G/DL (ref 3.5–5.2)
ALBUMIN UR-MCNC: NEGATIVE MG/DL
ALP SERPL-CCNC: 165 U/L (ref 40–129)
ALT SERPL W P-5'-P-CCNC: 39 U/L (ref 10–50)
ANION GAP SERPL CALCULATED.3IONS-SCNC: 9 MMOL/L (ref 7–15)
APPEARANCE UR: CLEAR
AST SERPL W P-5'-P-CCNC: 80 U/L (ref 10–50)
ATRIAL RATE - MUSE: 90 BPM
BILIRUB DIRECT SERPL-MCNC: 2.47 MG/DL (ref 0–0.3)
BILIRUB SERPL-MCNC: 9.7 MG/DL
BILIRUB UR QL STRIP: NEGATIVE
BUN SERPL-MCNC: 30.7 MG/DL (ref 6–20)
CALCIUM SERPL-MCNC: 10.1 MG/DL (ref 8.6–10)
CHLORIDE SERPL-SCNC: 97 MMOL/L (ref 98–107)
COLOR UR AUTO: YELLOW
CREAT SERPL-MCNC: 0.86 MG/DL (ref 0.67–1.17)
CREAT UR-MCNC: 29.2 MG/DL
DEPRECATED CALCIDIOL+CALCIFEROL SERPL-MC: 17 UG/L (ref 20–75)
DEPRECATED HCO3 PLAS-SCNC: 29 MMOL/L (ref 22–29)
DIASTOLIC BLOOD PRESSURE - MUSE: NORMAL MMHG
ERYTHROCYTE [DISTWIDTH] IN BLOOD BY AUTOMATED COUNT: 13.1 % (ref 10–15)
GFR SERPL CREATININE-BSD FRML MDRD: >90 ML/MIN/1.73M2
GLUCOSE SERPL-MCNC: 231 MG/DL (ref 70–99)
GLUCOSE UR STRIP-MCNC: 50 MG/DL
HCT VFR BLD AUTO: 26.6 % (ref 40–53)
HGB BLD-MCNC: 9.3 G/DL (ref 13.3–17.7)
HGB UR QL STRIP: NEGATIVE
INR PPP: 1.44 (ref 0.85–1.15)
INTERPRETATION ECG - MUSE: NORMAL
IRON BINDING CAPACITY (ROCHE): ABNORMAL
IRON SATN MFR SERPL: ABNORMAL %
IRON SERPL-MCNC: 249 UG/DL (ref 61–157)
KETONES UR STRIP-MCNC: NEGATIVE MG/DL
LEUKOCYTE ESTERASE UR QL STRIP: NEGATIVE
MCH RBC QN AUTO: 36.8 PG (ref 26.5–33)
MCHC RBC AUTO-ENTMCNC: 35 G/DL (ref 31.5–36.5)
MCV RBC AUTO: 105 FL (ref 78–100)
NITRATE UR QL: NEGATIVE
P AXIS - MUSE: 51 DEGREES
PH UR STRIP: 5 [PH] (ref 5–7)
PLATELET # BLD AUTO: 123 10E3/UL (ref 150–450)
POTASSIUM SERPL-SCNC: 4.6 MMOL/L (ref 3.4–5.3)
PR INTERVAL - MUSE: 168 MS
PROT SERPL-MCNC: 8.5 G/DL (ref 6.4–8.3)
PROT/CREAT 24H UR: NORMAL MG/G{CREAT}
QRS DURATION - MUSE: 86 MS
QT - MUSE: 364 MS
QTC - MUSE: 445 MS
R AXIS - MUSE: 40 DEGREES
RBC # BLD AUTO: 2.53 10E6/UL (ref 4.4–5.9)
SODIUM SERPL-SCNC: 135 MMOL/L (ref 136–145)
SP GR UR STRIP: 1.01 (ref 1–1.03)
SYSTOLIC BLOOD PRESSURE - MUSE: NORMAL MMHG
T AXIS - MUSE: 31 DEGREES
UROBILINOGEN UR STRIP-MCNC: NORMAL MG/DL
VENTRICULAR RATE- MUSE: 90 BPM
WBC # BLD AUTO: 9.8 10E3/UL (ref 4–11)

## 2022-12-20 PROCEDURE — 82728 ASSAY OF FERRITIN: CPT | Mod: 90 | Performed by: PATHOLOGY

## 2022-12-20 PROCEDURE — 99215 OFFICE O/P EST HI 40 MIN: CPT | Mod: GC | Performed by: INTERNAL MEDICINE

## 2022-12-20 PROCEDURE — 82306 VITAMIN D 25 HYDROXY: CPT | Mod: 90 | Performed by: PATHOLOGY

## 2022-12-20 PROCEDURE — 80053 COMPREHEN METABOLIC PANEL: CPT | Performed by: PATHOLOGY

## 2022-12-20 PROCEDURE — 99207 PR NO CHARGE COORDINATED CARE PS: CPT

## 2022-12-20 PROCEDURE — 99205 OFFICE O/P NEW HI 60 MIN: CPT | Performed by: TRANSPLANT SURGERY

## 2022-12-20 PROCEDURE — 85610 PROTHROMBIN TIME: CPT | Performed by: PATHOLOGY

## 2022-12-20 PROCEDURE — 81003 URINALYSIS AUTO W/O SCOPE: CPT | Performed by: PATHOLOGY

## 2022-12-20 PROCEDURE — 83540 ASSAY OF IRON: CPT | Performed by: PATHOLOGY

## 2022-12-20 PROCEDURE — G0463 HOSPITAL OUTPT CLINIC VISIT: HCPCS | Performed by: INTERNAL MEDICINE

## 2022-12-20 PROCEDURE — 36415 COLL VENOUS BLD VENIPUNCTURE: CPT | Performed by: PATHOLOGY

## 2022-12-20 PROCEDURE — 83550 IRON BINDING TEST: CPT | Performed by: PATHOLOGY

## 2022-12-20 PROCEDURE — 93975 VASCULAR STUDY: CPT | Mod: GC | Performed by: RADIOLOGY

## 2022-12-20 PROCEDURE — 82248 BILIRUBIN DIRECT: CPT | Performed by: PATHOLOGY

## 2022-12-20 PROCEDURE — 86780 TREPONEMA PALLIDUM: CPT | Mod: 90 | Performed by: PATHOLOGY

## 2022-12-20 PROCEDURE — 86665 EPSTEIN-BARR CAPSID VCA: CPT | Mod: 90 | Performed by: PATHOLOGY

## 2022-12-20 PROCEDURE — 99000 SPECIMEN HANDLING OFFICE-LAB: CPT | Performed by: PATHOLOGY

## 2022-12-20 PROCEDURE — 85027 COMPLETE CBC AUTOMATED: CPT | Performed by: PATHOLOGY

## 2022-12-20 PROCEDURE — 71046 X-RAY EXAM CHEST 2 VIEWS: CPT | Mod: GC | Performed by: RADIOLOGY

## 2022-12-20 PROCEDURE — 80307 DRUG TEST PRSMV CHEM ANLYZR: CPT | Mod: 90 | Performed by: PATHOLOGY

## 2022-12-20 PROCEDURE — 80321 ALCOHOLS BIOMARKERS 1OR 2: CPT | Mod: 90 | Performed by: PATHOLOGY

## 2022-12-20 PROCEDURE — 86644 CMV ANTIBODY: CPT | Mod: 90 | Performed by: PATHOLOGY

## 2022-12-20 RX ORDER — CHOLECALCIFEROL (VITAMIN D3) 50 MCG
1 TABLET ORAL DAILY
Qty: 90 TABLET | Refills: 3 | Status: SHIPPED | OUTPATIENT
Start: 2023-02-08 | End: 2023-01-21

## 2022-12-20 RX ORDER — ERGOCALCIFEROL 1.25 MG/1
50000 CAPSULE, LIQUID FILLED ORAL WEEKLY
Qty: 8 CAPSULE | Refills: 0 | Status: SHIPPED | OUTPATIENT
Start: 2022-12-20 | End: 2023-01-21

## 2022-12-20 NOTE — LETTER
12/20/2022         RE: Dillon Salter  [address] [place] [address]        Dear Colleague,    Thank you for referring your patient, Dillon Salter, to the [facility] [place]. Please see a copy of my visit note below.    [facility] Hepatology    Referring provider:  Momo Walters  Chief complaint:  LT Eval, A negative    Assessment  27 year old male with PMHx of decompensated alcohol related cirrhosis complicated by ascites, hepatic encephalopathy and history of variceal bleeding (5/2022). PMHx also includes autism spectrum disorder, type II diabetes, depression and alcohol use disorder.    In terms of the patient's decompensated alcohol-related cirrhosis his ascites is well managed with low-dose diuretics and his hepatic encephalopathy is well managed with maximal medical therapy.  He has a history of variceal bleeding but has not had any issues since May 2022 and is up-to-date on his variceal surveillance. His MELD-Na is 20.  His MELD-Na is partially driven by an element of spur cell anemia.    With regards to his alcohol use disorder his last use was April 6, 2022.  He has not undergone alcohol-related treatment programming following his last use and would benefit from substance use assessment and programming.    Furthermore the patient is frail and would benefit from physical therapy, occupational therapy and nutrition optimization.  He also has poor fasting blood sugar management on his current doses of long-acting insulin and would benefit from endocrinology referral.     Plan  - Follow up iron studies + ferritin  - Chemical dependency assessment and programming  - PT and OT given weakness  - CT angiogram  - PFTs given history of tobacco use  - Endocrine referral given poor diabetes control based on reported AM blood sugars  - Nutrition optimization; information provided from [email or web address] and printed  - Mental health referral    RTC: 3 months    Discussed  Return to the emergency department with worsening symptoms, uncontrolled pain, inability to tolerate oral liquids, fever greater than 101°F not controlled by Tylenol or as needed with emergent concerns.     with attending hepatologist, Dr. Israel Jones MD  Transplant Hepatology Fellow  [phone]    HPI:  EtOH related Cirrhosis  - hx HE  - hx ascites  - hx variceal bleed (5/2022)  - last EGD 6/2022: grade I varices, healing banding ulcers  - HCC screening: US 5/2022 - no hepatic lesions    The patient is accompanied by his mother Stephanie.    He reports being diagnosed with liver disease in April 2022 in the setting of variceal bleeding.  He reports that since April he has not had any issues with melena, hematochezia or hematemesis.  He has had intermittent issues with epistaxis as well as gum bleeding.  He denies any significant bleeding since September 2022; however he does have intermittent small amounts of epistaxis and gum bleeding.  Of note he has undergone over 20 units of packed red blood cell transfusions for hemolytic anemia.  His ferritin was noted to be in the 2000's in October 2022.  He underwent hemochromatosis testing which was negative - C282Y normal, H63D normal, S65D normal.    In terms of his fluid management he has never undergone a paracentesis.  His fluid and ascites is managed with 1 mg of Bumex daily and spironolactone 100 mg daily.  He denies any lower extremity edema or abdominal swelling at this time.    In terms of his hepatic encephalopathy he was noted to have confusion as early as May 2022.  He has been adherent to his lactulose and rifaximin however the family has down titrated his lactulose to as needed given that he has been having 3-4 bowel movements per day.  The family reports that his last episode of confusion that they can remember was back in September 2022.    In terms of his alcohol use, he started using alcohol at the age of 19.  He would drink beer, wine and Bacardi.  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  Prior to quitting in April 2022 he was drinking 1 case of beer per day.  He previously  went to treatment in 2018 in [place] for 24 days.  Following completion of that program he was sober for 18 days and then return to use. Last alcohol use: 4/6/2022    In terms of his diabetes management he is currently on 20 units of glargine twice daily. Last A1c 5.4% 7/2022, however his mom reports that his fasting blood sugars have been consistently over 200.    In terms of his anxiety and depression he is currently on Prozac.  He has never engaged in one-on-one psychotherapy.    He presented in a wheelchair today.  He denies any assistive devices at home for mobility.  He has a very slow gait.  He has difficulty holding up a fork and knife due to weakness.    COVID Vaccination: s/p 3 doses  Medical hx Surgical hx   Past Medical History:   Diagnosis Date     Acute on chronic anemia 04/08/2022     Alcohol use disorder      Alcoholic cirrhosis of liver with ascites (H)      Autism spectrum      Benign essential hypertension      Hepatic encephalopathy      Major depressive disorder      Type II Diabetes     Past Surgical History:   Procedure Laterality Date     ESOPHAGOSCOPY, GASTROSCOPY, DUODENOSCOPY (EGD), COMBINED N/A 5/24/2022    Procedure: ESOPHAGOGASTRODUODENOSCOPY (EGD) with esophageal banding;  Surgeon: Gary Hurst MD;  Location: [facility] OR     ESOPHAGOSCOPY, GASTROSCOPY, DUODENOSCOPY (EGD), COMBINED N/A 6/20/2022    Procedure: ESOPHAGOGASTRODUODENOSCOPY;  Surgeon: Gavino Reza MD;  Location: [facility] Main OR     MIDLINE DOUBLE LUMEN PLACEMENT  5/26/2022          PICC TRIPLE LUMEN PLACEMENT  7/28/2022        Abdominal Surgeries: Denies       Medications  Current Outpatient Medications   Medication Sig Dispense Refill     alcohol swab prep pads Use to swab area of injection/marsha as directed. 100 each 3     blood glucose (NO BRAND SPECIFIED) test strip Use to test blood sugar 4 times daily or as directed. To accompany: Blood Glucose Monitor Brands: per insurance. 100 strip 6      blood glucose monitoring (NO BRAND SPECIFIED) meter device kit Use to test blood sugar 4 times daily or as directed. Preferred blood glucose meter OR supplies to accompany: Blood Glucose Monitor Brands: per insurance. 1 kit 0     bumetanide (BUMEX) 1 MG tablet Take 1 tablet (1 mg) by mouth daily 60 tablet 5     fluconazole (DIFLUCAN) 150 MG tablet Take 150 mg by mouth daily as needed Patient to use for 7 days if yeast infection flare up.       FLUoxetine (PROZAC) 20 MG capsule        folic acid (FOLVITE) 1 MG tablet Take 1 tablet (1 mg) by mouth daily 90 tablet 1     gabapentin (NEURONTIN) 100 MG capsule        hydrOXYzine (ATARAX) 25 MG tablet Take 25 mg by mouth       insulin aspart (NOVOLOG FLEXPEN) 100 UNIT/ML pen 1 unit per 10 grams of carb, plus additional units based on following scale   For Pre-Meal  - 164 give 1 unit. For Pre-Meal  - 189 give 2 units. For Pre-Meal  - 214 give 3 units. For Pre-Meal  - 239 give 4 units. For Pre-Meal  - 264 give 5 units. For Pre-Meal  - 289 give 6 units. For Pre-Meal  - 314 give 7 units. For Pre-Meal  - 339 give 8 units. For Pre-Meal  - 364 give 9 units.  For Pre-Meal BG greater than or equal to 365 give 10 units 15 mL 3     insulin glargine (LANTUS PEN) 100 UNIT/ML pen Inject 20 Units Subcutaneous 2 times daily 15 mL 0     insulin pen needle (ULTICARE MICRO) 32G X 4 MM miscellaneous Use pen needles daily or as directed. 100 each 3     lactulose (CHRONULAC) 10 GM/15ML solution Take 15-30 mLs (10-20 g) by mouth 2 times daily 1800 mL 11     magnesium oxide 400 MG CAPS        multivitamin, therapeutic (THERA-VIT) TABS tablet Take 1 tablet by mouth in the morning.       oxyCODONE (ROXICODONE) 5 MG tablet Take 5 mg by mouth every 4 hours as needed for severe pain       pantoprazole (PROTONIX) 40 MG EC tablet Take 1 tablet (40 mg) by mouth daily 30 tablet 3     rifaximin (XIFAXAN) 550 MG TABS tablet Take 1 tablet (550 mg) by  "mouth 2 times daily 60 tablet 0     spironolactone (ALDACTONE) 100 MG tablet Take 1 tablet (100 mg) by mouth daily 60 tablet 5     thiamine (B-1) 100 MG tablet Take 1 tablet (100 mg) by mouth in the morning. 30 tablet 0     thin (NO BRAND SPECIFIED) lancets Use with lanceting device. To accompany: Blood Glucose Monitor Brands: per insurance. 100 each 6     furosemide (LASIX) 40 MG tablet  (Patient not taking: Reported on 12/20/2022)       Allergies  No Known Allergies    Family hx Social hx   Family History   Problem Relation Age of Onset     Hypertension Mother      Substance Abuse Mother      Thyroid Disease Mother      Heart Failure Father      Substance Abuse Father      Chronic Obstructive Pulmonary Disease Father      Substance Abuse Maternal Grandfather    No known family history of liver disease.   - Employment: Not currently working.  Previously worked in the [employer] industry  - Graduated high school  - Lives with mother and father, Tracy  - Alcohol: as above  - Tobacco: Former, quit ?5 years ago  - Drugs: Denies     Review of systems  A 10-point review of systems was negative.    Examination  BP (!) 140/87   Pulse 91   Ht 1.651 m (5' 5\")   Wt 60.8 kg (134 lb)   SpO2 100%   BMI 22.30 kg/m    Body mass index is 22.3 kg/m .    Gen-NAD  Eye- EOMI, conjunctival icterus, temporal wasting  ENT- MMM, normal oropharynx  CVS- RRR, no murmurs  RS- CTA bilaterally  Abd-soft, nontender, nondistended  Extr- 2+ radial pulses bilaterally, no lower extremity edema bilaterally  MS- hands without clubbing  Neuro- A+Ox3, no asterixis but tremulous against gravity  Skin- no rash. + jaundice  Psych- normal mood    Laboratory  BMPRecent Labs   Lab Test 12/20/22  0703 11/28/22  1119 09/29/22  0936 09/22/22  0933   * 133* 128* 132*   POTASSIUM 4.6 4.4 5.0 5.3*   CHLORIDE 97* 95* 91* 95*   SARTHAK 10.1* 9.5 9.1 9.2   CO2 29 31 26 26   BUN 30.7* 20 37* 29*   CR 0.86 0.82 0.99 0.91   * 209* 239* 230* "     CBC  Recent Labs   Lab Test 12/20/22  0703 11/28/22  1119 11/21/22  1133 11/07/22  1126   WBC 9.8 10.2 8.5 12.0*   RBC 2.53* 2.17* 2.08* 1.73*   HGB 9.3* 8.1* 7.8* 6.5*   HCT 26.6* 23.3* 22.3* 18.9*   * 107* 107* 109*   MCH 36.8* 37.3* 37.5* 37.6*   MCHC 35.0 34.8 35.0 34.4   RDW 13.1 14.5 14.5 12.5   * 138* 127* 125*     Liver Enzymes   Recent Labs   Lab Test 12/20/22  0703   PROTTOTAL 8.5*   ALBUMIN 3.9   BILITOTAL 9.7*   ALKPHOS 165*   AST 80*   ALT 39      INR   INR   Date Value Ref Range Status   12/20/2022 1.44 (H) 0.85 - 1.15 Final      Hep B s Ag negative  Hep B c Ab negative  Hep B s Ab > 1000  Hep C Ab negative  HFE testing: C282Y normal, H63D normal, S65D normal.    Radiology  CXR 12/2022  No acute air space disease.    Abdominal US 12/2022  1. Hepatomegaly with cirrhotic morphology. No focal lesion demonstrated.  2. Sequela of portal hypertension including recanalized paraumbilical vein and splenomegaly.  3. Patent Doppler evaluation of the hepatic vasculature.    TTE 7/2022  Left ventricular size, wall motion and function are normal. The ejection fraction is 60-65%.  Normal right ventricle size and systolic function.  No hemodynamically significant valvular abnormalities on 2D or color flow imaging.    Endoscopy  EGD 6/2022: Portal hypertensive gastropathy. Grade I varices with healing banding ulcers.     Attestation signed by Sergio Wood MD at 12/22/2022 10:06 AM:  The patient was seen and examined.  The above assessment and plan was developed jointly with the fellow.       Thank you very much for allowing me to participate in the care of this patient.  If you have any questions regarding recommendations, please do not hesitate to contact me.         Sergio Wood MD      Professor of Medicine  [facility] [employer]      Executive Medical Director, Solid Organ Transplant Program  [facility]

## 2022-12-20 NOTE — PROGRESS NOTES
"Psychosocial Assessment for Liver Transplant  Dillon Salter was seen in the Transplant Center as part of his evaluation as a potential liver transplant recipient.  His mother Leticia and father Wes also attended this interview.  Living Situation: Dillon lives with his parents Wes and Leticia in a house in [place], [place].  His parents own the home, and Dillon pays rent.  Dillon requires assistance with his medication management, and Leticia provides this. Dillon does not drive, ad his parents provide transportation to his medical appointments.   Dillon is independent with his personal cares and ambulation.  Education/Employment:  Dillon graduated high school.  He is literate in English.  Dillon describes himself as having \"high functioning\" autism.  Dillon reports his last employment was for [employer] as a  and .  He last worked a couple of years ago when laid off due to the COVID-19 pandemic.   Financial /Income: Dillon receives [employer] Security Income.  Health [facility].  This writer talked with Dillon and his parents about the financial risks of transplant, particularly about the high cost of transplant related medications and the importance of maintaining adequate health insurance coverage.  Family/Social Support: Dillon has never , and he is not currently in a relationship.  He does not have a child or children. He is an only child.   Dillon's primary support comes from his parents, Wes and Leticia, who he lives with and are involved and supportive.  This writer stressed the importance of having a stable and involved support network before and after transplant.  Provided Dillon and his parents with education about the relationship between a stable support system and better surgical and post-transplant outcomes compared to patients with a limited support system.    Chemical Dependency:  Dillon denies any history of using " tobacco products, illicit drugs or pain medication abuse.  Dillon reports a history of heavy alcohol use, and he has a history of one inpatient treatment in [place], [place] back in 2018.  He relapsed approximately eighteen days after completing treatment.  Dillon acknowledges the contribution alcohol has had on his health.  He reports motivation to remain sober.  Dillon reports he has abstained from alcohol since April 7, 2022.   Dillon reports his consumption was daily, approximately twelve beers per day plus bicardi cokes and wine.  Dillon reports COVID, job loss, loneliness and gaining confidence were the reasons he drank heavily.  Mental Health: Dillon has a history of depression and anxiety and he is prescribed prozac.  He denies any history of suicidal ideation or hospitalization for mental health treatment.   I recommended a referral to Dr. Randall for individual therapy, and Dillon is accepting of this recommendation.  PHQ-9 score today: 7  WILDA-7 score today: 12  Adjustment to Illness:  Dillon reports a significantly amount of worry prior to today's appointments.  He reports a high degree of relief and reduced worry after this morning's appointments.  Dillon is motivated to abstain from alcohol, and he is hopeful to have a liver transplant.  This writer provided Dillon  with supportive counseling throughout this interview.  This writer also encouraged Dillon to attend the liver transplant support group for additional support and encouragement.   Impression/Recommendations:   Dillon and his parents Wes and eLticia verbalize understanding the psychosocial risks of transplant and teaching provided during this evaluation.  The Caregiver Agreement for Liver Transplant was discussed.  Dillon's parents are his identified care givers.  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  He meets sobriety criteria recommended for listing, however this writer recommends Dillon complete a chemical  dependency evaluation and follow any treatment recommendations before being listed for transplant.  Dillon is agreeable to this plan.  He is open to entering and completing a virtual treatment program.  Dillon has depression and anxiety.  I have recommended Dillon establish care with a mental health therapist.  Mental health provider options were discussed, and a referral will be made to Dr. Randall.  Dillon has adequate finances and health insurance for transplant.   This writer will remain available to assist patient throughout the evaluation process and will follow patient through transplant if he is listed.  It was a pleasure to evaluate this patient for liver transplant.   Teaching completed during assessment:  1.     Housing and relocation needs post transplant.  2.     Caregiver needs post transplant.  3.     Financial issues related to transplant.  4.     Risks of alcohol use post transplant.  5.     Common psychosocial stressors pre/post transplant.          6.     Liver Transplant support group availability.          7.     [facility] Directive-form and education provided             Psychosocial Risks of Transplant Reviewed:  1.     Increased stress related to your emotional, family, social, employment, or   financial situation.  2.     Affect on work and/or disability benefits.  3.     Affect on future health and life insurance.  4.     Transplant outcome expectations may not be met.  5.     Mental Health risks: anxiety, depression, PTSD, guilt, grief and chronic fatigue.     MILLER Robledo

## 2022-12-20 NOTE — NURSING NOTE
"Chief Complaint   Patient presents with     Transplant Evaluation     Liver      Blood pressure (!) 140/87, pulse 91, height 1.651 m (5' 5\"), weight 60.8 kg (134 lb), SpO2 100 %.    Ketty Paredes, ARMANDO    "

## 2022-12-20 NOTE — PROGRESS NOTES
"HPI      ROS      Physical Exam    \"Much or all of the text in this note was generated through the use of Dragon Dictate voice to text software. Errors in spelling or words which appear to be out of context are unintentional, may be present due having escaped editing\"    Assessment and Plan:  1. liver transplant evaluation - patient is a good candidate overall. Benefits and surgical risks of a liver transplantation were discussed.  2.  End stage liver disease due to Laennec's    Surgical evaluation:  1. Portal Vein:Patent  2. Hepatic Artery: Open  3. TIPS: absent  4. Previous Abdominal Surgery: No  5. Hepatocellular Carcinoma: None  6. Ascites: Present - large amount  7. Costal Angle: wide  8. Portopulmonary Hypertension: absent  9. Hepatopulmonary Syndrome: absent  10. Cardiac Evaluation: needs stress echocardiogram  11. Nutritional Status: Poor  12. Diabetes: Type 2 diabetes mellitus since 2012    15. Meets guidelines to receive Living Donor  Yes -  ....  16. Potential Living donors No    Recommendations:   Complete evaluation, he also needs consultation with our alcohol counselor.      Patients overall evaluation will be discussed at the Liver Transplant selection committee meeting with a final recommendation on the patients suitability for transplant to be made at that time.    Consult Full  Details:  Dillon Salter was seen in consultation at the request of Dr. Sergio Wood for evaluation as a potential liver transplant recipient.    Reason for Visit:  Dillon Salter is a 27 year old year old male with Laennec's, who presents for liver transplant evaluation.    HPI:  Presenting complaint: Jaundice    April 2022, he noticed some nonhealing bleeder on his abdomen.  He went to the emergency room and was diagnosed to have Laënnec cirrhosis.  He was noted to have ascites as well as esophageal varices.  He underwent endoscopy and banding.  He gives history of encephalopathy and was put on lactulose.  No history of " spontaneous bacterial peritonitis.  He does give history of easy fatigability and weakness.  He is unable to walk long distances and primarily uses a scooter.    He does have autism spectrum disorder.    Present with him what his parents.      Past Medical History:   Diagnosis Date     Acute on chronic anemia 04/08/2022     Alcohol use disorder      Alcoholic cirrhosis of liver with ascites (H)      Benign essential hypertension      Hepatic encephalopathy      Major depressive disorder      Type II Diabetes      Past Surgical History:   Procedure Laterality Date     ESOPHAGOSCOPY, GASTROSCOPY, DUODENOSCOPY (EGD), COMBINED N/A 5/24/2022    Procedure: ESOPHAGOGASTRODUODENOSCOPY (EGD) with esophageal banding;  Surgeon: Gary Hurst MD;  Location: [facility]     ESOPHAGOSCOPY, GASTROSCOPY, DUODENOSCOPY (EGD), COMBINED N/A 6/20/2022    Procedure: ESOPHAGOGASTRODUODENOSCOPY;  Surgeon: Gavino Reza MD;  Location: [facility]     MIDLINE DOUBLE LUMEN PLACEMENT  5/26/2022          PICC TRIPLE LUMEN PLACEMENT  7/28/2022          Past Surgical History:   Procedure Laterality Date     ESOPHAGOSCOPY, GASTROSCOPY, DUODENOSCOPY (EGD), COMBINED N/A 5/24/2022    Procedure: ESOPHAGOGASTRODUODENOSCOPY (EGD) with esophageal banding;  Surgeon: Gary Hurst MD;  Location: [facility]     ESOPHAGOSCOPY, GASTROSCOPY, DUODENOSCOPY (EGD), COMBINED N/A 6/20/2022    Procedure: ESOPHAGOGASTRODUODENOSCOPY;  Surgeon: Gavino Reza MD;  Location: [facility]     MIDLINE DOUBLE LUMEN PLACEMENT  5/26/2022          PICC TRIPLE LUMEN PLACEMENT  7/28/2022          Family History   Problem Relation Age of Onset     Substance Abuse Mother      Substance Abuse Father      Substance Abuse Maternal Grandfather      No Known Allergies  Prior to Admission medications    Medication Sig Start Date End Date Taking? Authorizing Provider   alcohol swab prep pads Use to swab area of  injection/marsha as directed. 4/12/22   Elizabeth Guaman MD   blood glucose (NO BRAND SPECIFIED) test strip Use to test blood sugar 4 times daily or as directed. To accompany: Blood Glucose Monitor Brands: per insurance. 4/12/22   Elizabeth Guaman MD   blood glucose monitoring (NO BRAND SPECIFIED) meter device kit Use to test blood sugar 4 times daily or as directed. Preferred blood glucose meter OR supplies to accompany: Blood Glucose Monitor Brands: per insurance. 4/12/22   Elizabeth Guaman MD   bumetanide (BUMEX) 1 MG tablet Take 1 tablet (1 mg) by mouth daily 10/17/22   Momo Walters PA-C   fluconazole (DIFLUCAN) 150 MG tablet Take 150 mg by mouth daily as needed Patient to use for 7 days if yeast infection flare up.    Unknown, Entered By History   FLUoxetine (PROZAC) 20 MG capsule  8/9/22   Reported, Patient   folic acid (FOLVITE) 1 MG tablet Take 1 tablet (1 mg) by mouth daily 8/4/22   Tez Riojas MD   furosemide (LASIX) 40 MG tablet  9/4/22   Reported, Patient   gabapentin (NEURONTIN) 100 MG capsule  8/30/22   Reported, Patient   hydrOXYzine (ATARAX) 25 MG tablet Take 25 mg by mouth 8/9/22   Reported, Patient   insulin aspart (NOVOLOG FLEXPEN) 100 UNIT/ML pen 1 unit per 10 grams of carb, plus additional units based on following scale   For Pre-Meal  - 164 give 1 unit. For Pre-Meal  - 189 give 2 units. For Pre-Meal  - 214 give 3 units. For Pre-Meal  - 239 give 4 units. For Pre-Meal  - 264 give 5 units. For Pre-Meal  - 289 give 6 units. For Pre-Meal  - 314 give 7 units. For Pre-Meal  - 339 give 8 units. For Pre-Meal  - 364 give 9 units.  For Pre-Meal BG greater than or equal to 365 give 10 units 8/2/22   Nidhi Willett MD   insulin glargine (LANTUS PEN) 100 UNIT/ML pen Inject 20 Units Subcutaneous 2 times daily 8/16/22   Braulio Negron MD   insulin pen needle (ULTICARE MICRO) 32G X 4 MM miscellaneous Use pen needles daily or as directed.  4/12/22   Elizabeth Guaman MD   lactulose (CHRONULAC) 10 GM/15ML solution Take 15-30 mLs (10-20 g) by mouth 2 times daily 11/18/22   Momo Walters PA-C   magnesium oxide 400 MG CAPS     Reported, Patient   multivitamin, therapeutic (THERA-VIT) TABS tablet Take 1 tablet by mouth in the morning. 4/12/22   Elizabeth Guaman MD   oxyCODONE (ROXICODONE) 5 MG tablet Take 5 mg by mouth every 4 hours as needed for severe pain    Unknown, Entered By History   pantoprazole (PROTONIX) 40 MG EC tablet Take 1 tablet (40 mg) by mouth daily 8/22/22   Momo Walters PA-C   rifaximin (XIFAXAN) 550 MG TABS tablet Take 1 tablet (550 mg) by mouth 2 times daily 8/22/22   Momo Walters PA-C   spironolactone (ALDACTONE) 100 MG tablet Take 1 tablet (100 mg) by mouth daily 9/6/22   Momo Walters PA-C   thiamine (B-1) 100 MG tablet Take 1 tablet (100 mg) by mouth in the morning. 4/12/22   Elizabeth Guaman MD   thin (NO BRAND SPECIFIED) lancets Use with lanceting device. To accompany: Blood Glucose Monitor Brands: per insurance. 4/12/22   Elizabeth Guaman MD       Previous Transplant Hx: No    Cardiovascular Hx:       h/o Cardiac Issues: No       Exercise Tolerance: no chest pain or shortness of breath with exertion.    Potential Donor(s): No    ROS:    REVIEW OF SYSTEMS (check box if normal)  [x]                GENERAL  [x]                  PULMONARY [x]                 GENITOURINARY  [x]                 CNS                 [x]                  CARDIAC  [x]                  ENDOCRINE  [x]                 EARS,NOSE,THROAT [x]                  GASTROINTESTINAL [x]                  NEUROLOGIC    [x]                 MUSCLOSKELTAL  [x]                   HEMATOLOGY    Examination:     Vitals:  There were no vitals taken for this visit.    GENERAL APPEARANCE: alert and no distress  EYES: PERRL  HENT: mouth without ulcers or lesions  NECK: supple, no adenopathy  RESP: lungs clear to auscultation - no rales, rhonchi  or wheezes  CV: regular rhythm, normal rate, no rub   ABDOMEN:  soft, nontender, abdomen is distended with significant amount of ascites.   MS: extremities normal- no gross deformities noted, no evidence of inflammation in joints, no muscle tenderness  SKIN: no rash  NEURO: Normal strength and tone, sensory exam grossly normal, mentation intact and speech normal  PSYCH: mentation appears normal. and affect normal/bright      Results:   Recent Results (from the past 168 hour(s))   UA reflex to Microscopic and Culture    Collection Time: 12/20/22  6:58 AM    Specimen: Urine, Midstream   Result Value Ref Range    Color Urine Yellow Colorless, Straw, Light Yellow, Yellow    Appearance Urine Clear Clear    Glucose Urine 50 (A) Negative mg/dL    Bilirubin Urine Negative Negative    Ketones Urine Negative Negative mg/dL    Specific Gravity Urine 1.010 1.003 - 1.035    Blood Urine Negative Negative    pH Urine 5.0 5.0 - 7.0    Protein Albumin Urine Negative Negative mg/dL    Urobilinogen Urine Normal Normal, 2.0 mg/dL    Nitrite Urine Negative Negative    Leukocyte Esterase Urine Negative Negative   Protein  random urine    Collection Time: 12/20/22  6:58 AM   Result Value Ref Range    Total Protein Urine mg/dL <6.0 mg/dL    Total Protein UR MG/MG CR      Creatinine Urine mg/dL 29.2 mg/dL   Basic metabolic panel    Collection Time: 12/20/22  7:03 AM   Result Value Ref Range    Sodium 135 (L) 136 - 145 mmol/L    Potassium 4.6 3.4 - 5.3 mmol/L    Chloride 97 (L) 98 - 107 mmol/L    Carbon Dioxide (CO2) 29 22 - 29 mmol/L    Anion Gap 9 7 - 15 mmol/L    Urea Nitrogen 30.7 (H) 6.0 - 20.0 mg/dL    Creatinine 0.86 0.67 - 1.17 mg/dL    Calcium 10.1 (H) 8.6 - 10.0 mg/dL    Glucose 231 (H) 70 - 99 mg/dL    GFR Estimate >90 >60 mL/min/1.73m2   Hepatic panel    Collection Time: 12/20/22  7:03 AM   Result Value Ref Range    Protein Total 8.5 (H) 6.4 - 8.3 g/dL    Albumin 3.9 3.5 - 5.2 g/dL    Bilirubin Total 9.7 (H) <=1.2 mg/dL     Alkaline Phosphatase 165 (H) 40 - 129 U/L    AST 80 (H) 10 - 50 U/L    ALT 39 10 - 50 U/L    Bilirubin Direct 2.47 (H) 0.00 - 0.30 mg/dL   INR    Collection Time: 12/20/22  7:03 AM   Result Value Ref Range    INR 1.44 (H) 0.85 - 1.15   CBC with platelets    Collection Time: 12/20/22  7:03 AM   Result Value Ref Range    WBC Count 9.8 4.0 - 11.0 10e3/uL    RBC Count 2.53 (L) 4.40 - 5.90 10e6/uL    Hemoglobin 9.3 (L) 13.3 - 17.7 g/dL    Hematocrit 26.6 (L) 40.0 - 53.0 %     (H) 78 - 100 fL    MCH 36.8 (H) 26.5 - 33.0 pg    MCHC 35.0 31.5 - 36.5 g/dL    RDW 13.1 10.0 - 15.0 %    Platelet Count 123 (L) 150 - 450 10e3/uL   EKG 12-lead, tracing only [EKG1]    Collection Time: 12/20/22  7:23 AM   Result Value Ref Range    Systolic Blood Pressure  mmHg    Diastolic Blood Pressure  mmHg    Ventricular Rate 90 BPM    Atrial Rate 90 BPM    NM Interval 168 ms    QRS Duration 86 ms     ms    QTc 445 ms    P Axis 51 degrees    R AXIS 40 degrees    T Axis 31 degrees    Interpretation ECG       Sinus rhythm  Normal ECG  When compared with ECG of 14-AUG-2022 16:59,  No significant change was found       I had a long discussion with the patient regarding liver transplantation which included but was not limited to  the following points:    1. Liver transplant selection committee process.  2. The federal rules for cadaveric waiting list, the size and blood type matching of the organ. The availability of living-related donor transplantation.  3. The types of donors: brain death donors, non-heart beating donors, partial liver grafts: splits and living donor grafts  4. Extended criteria  Donors (older age, steasosis) and the increased  risk of primary non-function using the extended criteria donors  5. The CDC high risk donors,  Risk of donor transmitted infections and donor transmitted malignancy  6. The liver transplant operation and the associated risks and technical complications which can include intraoperative death,  post operative death,  Primary non-function, bleeding requiring re-operations, arterial and biliary complications, bowel perforations, and intra abdominal abscess. Some of these complicaitons may require a second operation.  7. The postoperative course, the ICU stay and risk of postoperative complications which can include sepsis, MI, stroke, brain injury, pneumonia, pleural effusions, and renal dysfunction.  8. The current 1 year and 5 year graft and patient survivals.  9. The need for life long immunosuppressive therapy and the side effects of these medications, including the possibility of toxicity, opportunistic infections, risk of cancer including lymphoma, and the possibility of rejection even if the patient is taking the medication exactly as prescribed.  10. The need for compliance with medications and follow-up visits in the clinic and thereafter.  11. The patient and family understand these risks and wish to proceed to transplantation       Review of prior external note(s) from - epic  60 minutes spent on the date of the encounter doing chart review, history and exam, documentation and further activities per the note     HEMA CANELA    Copy to patient  BRANDON ROONEY MARK  [id number] [facility] [id number]

## 2022-12-20 NOTE — LETTER
"    12/20/2022         RE: Dillon Salter  [address] [facility] [address]        Dear Colleague,    Thank you for referring your patient, Dillon Salter, to the [facility]. Please see a copy of my visit note below.    HPI      ROS      Physical Exam    \"Much or all of the text in this note was generated through the use of Dragon Dictate voice to text software. Errors in spelling or words which appear to be out of context are unintentional, may be present due having escaped editing\"    Assessment and Plan:  1. liver transplant evaluation - patient is a good candidate overall. Benefits and surgical risks of a liver transplantation were discussed.  2.  End stage liver disease due to Laennec's    Surgical evaluation:  1. Portal Vein:Patent  2. Hepatic Artery: Open  3. TIPS: absent  4. Previous Abdominal Surgery: No  5. Hepatocellular Carcinoma: None  6. Ascites: Present - large amount  7. Costal Angle: wide  8. Portopulmonary Hypertension: absent  9. Hepatopulmonary Syndrome: absent  10. Cardiac Evaluation: needs stress echocardiogram  11. Nutritional Status: Poor  12. Diabetes: Type 2 diabetes mellitus since 2012    15. Meets guidelines to receive Living Donor  Yes -  ....  16. Potential Living donors No    Recommendations:   Complete evaluation, he also needs consultation with our alcohol counselor.      Patients overall evaluation will be discussed at the Liver Transplant selection committee meeting with a final recommendation on the patients suitability for transplant to be made at that time.    Consult Full  Details:  Dillon Salter was seen in consultation at the request of Dr. Sergio Wood for evaluation as a potential liver transplant recipient.    Reason for Visit:  Dillon Salter is a 27 year old year old male with Laennec's, who presents for liver transplant evaluation.    HPI:  Presenting complaint: Jaundice    April 2022, he noticed some nonhealing bleeder on his " abdomen.  He went to the emergency room and was diagnosed to have Laënnec cirrhosis.  He was noted to have ascites as well as esophageal varices.  He underwent endoscopy and banding.  He gives history of encephalopathy and was put on lactulose.  No history of spontaneous bacterial peritonitis.  He does give history of easy fatigability and weakness.  He is unable to walk long distances and primarily uses a scooter.    He does have autism spectrum disorder.    Present with him what his parents.      Past Medical History:   Diagnosis Date     Acute on chronic anemia 04/08/2022     Alcohol use disorder      Alcoholic cirrhosis of liver with ascites (H)      Benign essential hypertension      Hepatic encephalopathy      Major depressive disorder      Type II Diabetes      Past Surgical History:   Procedure Laterality Date     ESOPHAGOSCOPY, GASTROSCOPY, DUODENOSCOPY (EGD), COMBINED N/A 5/24/2022    Procedure: ESOPHAGOGASTRODUODENOSCOPY (EGD) with esophageal banding;  Surgeon: Gary Hurst MD;  Location: [facility]     ESOPHAGOSCOPY, GASTROSCOPY, DUODENOSCOPY (EGD), COMBINED N/A 6/20/2022    Procedure: ESOPHAGOGASTRODUODENOSCOPY;  Surgeon: Gavino Reza MD;  Location: [facility]     MIDLINE DOUBLE LUMEN PLACEMENT  5/26/2022          PICC TRIPLE LUMEN PLACEMENT  7/28/2022          Past Surgical History:   Procedure Laterality Date     ESOPHAGOSCOPY, GASTROSCOPY, DUODENOSCOPY (EGD), COMBINED N/A 5/24/2022    Procedure: ESOPHAGOGASTRODUODENOSCOPY (EGD) with esophageal banding;  Surgeon: Gary Hurst MD;  Location: [facility]     ESOPHAGOSCOPY, GASTROSCOPY, DUODENOSCOPY (EGD), COMBINED N/A 6/20/2022    Procedure: ESOPHAGOGASTRODUODENOSCOPY;  Surgeon: Gavino Reza MD;  Location: [facility]     MIDLINE DOUBLE LUMEN PLACEMENT  5/26/2022          PICC TRIPLE LUMEN PLACEMENT  7/28/2022          Family History   Problem Relation Age of Onset     Substance Abuse  Mother      Substance Abuse Father      Substance Abuse Maternal Grandfather      No Known Allergies  Prior to Admission medications    Medication Sig Start Date End Date Taking? Authorizing Provider   alcohol swab prep pads Use to swab area of injection/marsha as directed. 4/12/22   Elizabeth Guaman MD   blood glucose (NO BRAND SPECIFIED) test strip Use to test blood sugar 4 times daily or as directed. To accompany: Blood Glucose Monitor Brands: per insurance. 4/12/22   Elizabeth Guaman MD   blood glucose monitoring (NO BRAND SPECIFIED) meter device kit Use to test blood sugar 4 times daily or as directed. Preferred blood glucose meter OR supplies to accompany: Blood Glucose Monitor Brands: per insurance. 4/12/22   Elizabeth Guaman MD   bumetanide (BUMEX) 1 MG tablet Take 1 tablet (1 mg) by mouth daily 10/17/22   Momo Walters PA-C   fluconazole (DIFLUCAN) 150 MG tablet Take 150 mg by mouth daily as needed Patient to use for 7 days if yeast infection flare up.    Unknown, Entered By History   FLUoxetine (PROZAC) 20 MG capsule  8/9/22   Reported, Patient   folic acid (FOLVITE) 1 MG tablet Take 1 tablet (1 mg) by mouth daily 8/4/22   Tez Riojas MD   furosemide (LASIX) 40 MG tablet  9/4/22   Reported, Patient   gabapentin (NEURONTIN) 100 MG capsule  8/30/22   Reported, Patient   hydrOXYzine (ATARAX) 25 MG tablet Take 25 mg by mouth 8/9/22   Reported, Patient   insulin aspart (NOVOLOG FLEXPEN) 100 UNIT/ML pen 1 unit per 10 grams of carb, plus additional units based on following scale   For Pre-Meal  - 164 give 1 unit. For Pre-Meal  - 189 give 2 units. For Pre-Meal  - 214 give 3 units. For Pre-Meal  - 239 give 4 units. For Pre-Meal  - 264 give 5 units. For Pre-Meal  - 289 give 6 units. For Pre-Meal  - 314 give 7 units. For Pre-Meal  - 339 give 8 units. For Pre-Meal  - 364 give 9 units.  For Pre-Meal BG greater than or equal to 365 give 10 units 8/2/22    Nidhi Willett MD   insulin glargine (LANTUS PEN) 100 UNIT/ML pen Inject 20 Units Subcutaneous 2 times daily 8/16/22   Braulio Negron MD   insulin pen needle (ULTICARE MICRO) 32G X 4 MM miscellaneous Use pen needles daily or as directed. 4/12/22   Elizabeth Guaman MD   lactulose (CHRONULAC) 10 GM/15ML solution Take 15-30 mLs (10-20 g) by mouth 2 times daily 11/18/22   Momo Walters PA-C   magnesium oxide 400 MG CAPS     Reported, Patient   multivitamin, therapeutic (THERA-VIT) TABS tablet Take 1 tablet by mouth in the morning. 4/12/22   Elizabeth Guaman MD   oxyCODONE (ROXICODONE) 5 MG tablet Take 5 mg by mouth every 4 hours as needed for severe pain    Unknown, Entered By History   pantoprazole (PROTONIX) 40 MG EC tablet Take 1 tablet (40 mg) by mouth daily 8/22/22   Momo Walters PA-C   rifaximin (XIFAXAN) 550 MG TABS tablet Take 1 tablet (550 mg) by mouth 2 times daily 8/22/22   Momo Walters PA-C   spironolactone (ALDACTONE) 100 MG tablet Take 1 tablet (100 mg) by mouth daily 9/6/22   Momo Walters PA-C   thiamine (B-1) 100 MG tablet Take 1 tablet (100 mg) by mouth in the morning. 4/12/22   Elizabeth Guaman MD   thin (NO BRAND SPECIFIED) lancets Use with lanceting device. To accompany: Blood Glucose Monitor Brands: per insurance. 4/12/22   Elizabeth Guaman MD       Previous Transplant Hx: No    Cardiovascular Hx:       h/o Cardiac Issues: No       Exercise Tolerance: no chest pain or shortness of breath with exertion.    Potential Donor(s): No    ROS:    REVIEW OF SYSTEMS (check box if normal)  [x]                GENERAL  [x]                  PULMONARY [x]                 GENITOURINARY  [x]                 CNS                 [x]                  CARDIAC  [x]                  ENDOCRINE  [x]                 EARS,NOSE,THROAT [x]                  GASTROINTESTINAL [x]                  NEUROLOGIC    [x]                 MUSCLOSKELTAL  [x]                    HEMATOLOGY    Examination:     Vitals:  There were no vitals taken for this visit.    GENERAL APPEARANCE: alert and no distress  EYES: PERRL  HENT: mouth without ulcers or lesions  NECK: supple, no adenopathy  RESP: lungs clear to auscultation - no rales, rhonchi or wheezes  CV: regular rhythm, normal rate, no rub   ABDOMEN:  soft, nontender, abdomen is distended with significant amount of ascites.   MS: extremities normal- no gross deformities noted, no evidence of inflammation in joints, no muscle tenderness  SKIN: no rash  NEURO: Normal strength and tone, sensory exam grossly normal, mentation intact and speech normal  PSYCH: mentation appears normal. and affect normal/bright      Results:   Recent Results (from the past 168 hour(s))   UA reflex to Microscopic and Culture    Collection Time: 12/20/22  6:58 AM    Specimen: Urine, Midstream   Result Value Ref Range    Color Urine Yellow Colorless, Straw, Light Yellow, Yellow    Appearance Urine Clear Clear    Glucose Urine 50 (A) Negative mg/dL    Bilirubin Urine Negative Negative    Ketones Urine Negative Negative mg/dL    Specific Gravity Urine 1.010 1.003 - 1.035    Blood Urine Negative Negative    pH Urine 5.0 5.0 - 7.0    Protein Albumin Urine Negative Negative mg/dL    Urobilinogen Urine Normal Normal, 2.0 mg/dL    Nitrite Urine Negative Negative    Leukocyte Esterase Urine Negative Negative   Protein  random urine    Collection Time: 12/20/22  6:58 AM   Result Value Ref Range    Total Protein Urine mg/dL <6.0 mg/dL    Total Protein UR MG/MG CR      Creatinine Urine mg/dL 29.2 mg/dL   Basic metabolic panel    Collection Time: 12/20/22  7:03 AM   Result Value Ref Range    Sodium 135 (L) 136 - 145 mmol/L    Potassium 4.6 3.4 - 5.3 mmol/L    Chloride 97 (L) 98 - 107 mmol/L    Carbon Dioxide (CO2) 29 22 - 29 mmol/L    Anion Gap 9 7 - 15 mmol/L    Urea Nitrogen 30.7 (H) 6.0 - 20.0 mg/dL    Creatinine 0.86 0.67 - 1.17 mg/dL    Calcium 10.1 (H) 8.6 - 10.0 mg/dL     Glucose 231 (H) 70 - 99 mg/dL    GFR Estimate >90 >60 mL/min/1.73m2   Hepatic panel    Collection Time: 12/20/22  7:03 AM   Result Value Ref Range    Protein Total 8.5 (H) 6.4 - 8.3 g/dL    Albumin 3.9 3.5 - 5.2 g/dL    Bilirubin Total 9.7 (H) <=1.2 mg/dL    Alkaline Phosphatase 165 (H) 40 - 129 U/L    AST 80 (H) 10 - 50 U/L    ALT 39 10 - 50 U/L    Bilirubin Direct 2.47 (H) 0.00 - 0.30 mg/dL   INR    Collection Time: 12/20/22  7:03 AM   Result Value Ref Range    INR 1.44 (H) 0.85 - 1.15   CBC with platelets    Collection Time: 12/20/22  7:03 AM   Result Value Ref Range    WBC Count 9.8 4.0 - 11.0 10e3/uL    RBC Count 2.53 (L) 4.40 - 5.90 10e6/uL    Hemoglobin 9.3 (L) 13.3 - 17.7 g/dL    Hematocrit 26.6 (L) 40.0 - 53.0 %     (H) 78 - 100 fL    MCH 36.8 (H) 26.5 - 33.0 pg    MCHC 35.0 31.5 - 36.5 g/dL    RDW 13.1 10.0 - 15.0 %    Platelet Count 123 (L) 150 - 450 10e3/uL   EKG 12-lead, tracing only [EKG1]    Collection Time: 12/20/22  7:23 AM   Result Value Ref Range    Systolic Blood Pressure  mmHg    Diastolic Blood Pressure  mmHg    Ventricular Rate 90 BPM    Atrial Rate 90 BPM    MI Interval 168 ms    QRS Duration 86 ms     ms    QTc 445 ms    P Axis 51 degrees    R AXIS 40 degrees    T Axis 31 degrees    Interpretation ECG       Sinus rhythm  Normal ECG  When compared with ECG of 14-AUG-2022 16:59,  No significant change was found       I had a long discussion with the patient regarding liver transplantation which included but was not limited to  the following points:    1. Liver transplant selection committee process.  2. The federal rules for cadaveric waiting list, the size and blood type matching of the organ. The availability of living-related donor transplantation.  3. The types of donors: brain death donors, non-heart beating donors, partial liver grafts: splits and living donor grafts  4. Extended criteria  Donors (older age, steasosis) and the increased  risk of primary non-function using  the extended criteria donors  5. The CDC high risk donors,  Risk of donor transmitted infections and donor transmitted malignancy  6. The liver transplant operation and the associated risks and technical complications which can include intraoperative death, post operative death,  Primary non-function, bleeding requiring re-operations, arterial and biliary complications, bowel perforations, and intra abdominal abscess. Some of these complicaitons may require a second operation.  7. The postoperative course, the ICU stay and risk of postoperative complications which can include sepsis, MI, stroke, brain injury, pneumonia, pleural effusions, and renal dysfunction.  8. The current 1 year and 5 year graft and patient survivals.  9. The need for life long immunosuppressive therapy and the side effects of these medications, including the possibility of toxicity, opportunistic infections, risk of cancer including lymphoma, and the possibility of rejection even if the patient is taking the medication exactly as prescribed.  10. The need for compliance with medications and follow-up visits in the clinic and thereafter.  11. The patient and family understand these risks and wish to proceed to transplantation       Review of prior external note(s) from - epic  60 minutes spent on the date of the encounter doing chart review, history and exam, documentation and further activities per the note     HEMA CANELA    Copy to patient  BRANDON ROONEY TORIBIO,DEBRA  [address] [facility] [address]      Again, thank you for allowing me to participate in the care of your patient.        Sincerely,        John Rondon MD

## 2022-12-20 NOTE — COMMITTEE REVIEW
Abdominal Committee Review Note     Evaluation Date: 12/20/2022  Committee Review Date: 12/20/2022    Organ being evaluated for: Liver    Transplant Phase: Evaluation  Transplant Status: Active    Transplant Coordinator: Deni Hicks Jr.  Transplant Surgeon:  Dr. Rondon    Referring Physician: Elizabeth Guaman    Primary Diagnosis: Alcoholic Cirrhosis  Secondary Diagnosis:     Committee Review Members:  Nutrition La Nena Whitley, RD   Pharmacist Cristopher Brooks, [facility]    - Clinical Brandon Cummins, CHRISTIAN, Criselda Brewer, [facility]   Transplant Snehal Harper, RN, Jr Deni Hicks, RN, Kyra Mcnulty, APRN CNP, Juan Mendez, RN, Ehsan Cedillo MD   Transplant Hepatology  Claudia Bal, RN, Lelo Dias, CONI, Urmila Hill, RN, Marilin Jones MD, Sergio Wood MD, Kimi Mayorga MD, Vicky Castro, RN, Lillie Flynn MD, Viola Nelson MD, Gavino Jackson MD, Thomas M. Leventhal, MD   Transplant Surgery Ryder Shaikh MD, Joshua Cox MD, John Rondon MD       Transplant Eligibility: Cirrhosis with MELD, ETOH    Committee Review Decision: Needs Re-presentation    Relative Contraindications: Other, pending evaluation    Absolute Contraindications: None    Committee Chair Sergio Wood MD verbally attested to the committee's decision.    Committee Discussion Details:     Re-discuss pending evaluation; mainly frailty assessment/improvement    PT/OT - ordered  Needs CD eval - jennifer'd  Endocrine - jennifer'd  PFTs - ordered  Psycho-therapy - ordered    Plan  - Chemical dependency assessment and programming  - PT and OT given weakness  - CT angiogram  - PFTs given history of tobacco use  - Endocrine referral given poor diabetes control based on reported AM blood sugars  - Nutrition optimization; information provided from [email or web address] and printed  - Mental health referral

## 2022-12-21 ENCOUNTER — TELEPHONE (OUTPATIENT)
Dept: ONCOLOGY | Facility: CLINIC | Age: 27
End: 2022-12-21

## 2022-12-21 LAB
ETHYL GLUCURONIDE UR QL SCN: NEGATIVE NG/ML
FERRITIN SERPL-MCNC: 2207 NG/ML (ref 31–409)
T PALLIDUM AB SER QL: NONREACTIVE

## 2022-12-22 LAB
CMV IGG SERPL IA-ACNC: <0.2 U/ML
CMV IGG SERPL IA-ACNC: NORMAL
EBV VCA IGG SER IA-ACNC: 19.2 U/ML
EBV VCA IGG SER IA-ACNC: ABNORMAL
PLPETH BLD-MCNC: <10 NG/ML
POPETH BLD-MCNC: <10 NG/ML

## 2022-12-23 ENCOUNTER — TELEPHONE (OUTPATIENT)
Dept: BEHAVIORAL HEALTH | Facility: CLINIC | Age: 27
End: 2022-12-23

## 2022-12-28 ENCOUNTER — TELEPHONE (OUTPATIENT)
Dept: BEHAVIORAL HEALTH | Facility: CLINIC | Age: 27
End: 2022-12-28

## 2022-12-28 ENCOUNTER — HOSPITAL ENCOUNTER (OUTPATIENT)
Dept: BEHAVIORAL HEALTH | Facility: CLINIC | Age: 27
Discharge: HOME | End: 2022-12-28
Attending: FAMILY MEDICINE | Admitting: FAMILY MEDICINE
Payer: COMMERCIAL

## 2022-12-28 VITALS — HEIGHT: 65 IN | BODY MASS INDEX: 22.49 KG/M2 | WEIGHT: 135 LBS

## 2022-12-28 DIAGNOSIS — K70.31 ALCOHOLIC CIRRHOSIS OF LIVER WITH ASCITES (H): ICD-10-CM

## 2022-12-28 DIAGNOSIS — K76.6 PORTAL HYPERTENSION (H): ICD-10-CM

## 2022-12-28 PROCEDURE — H0001 ALCOHOL AND/OR DRUG ASSESS: HCPCS | Mod: GT,95 | Performed by: COUNSELOR

## 2022-12-28 ASSESSMENT — COLUMBIA-SUICIDE SEVERITY RATING SCALE - C-SSRS
5. HAVE YOU STARTED TO WORK OUT OR WORKED OUT THE DETAILS OF HOW TO KILL YOURSELF? DO YOU INTEND TO CARRY OUT THIS PLAN?: NO
2. HAVE YOU ACTUALLY HAD ANY THOUGHTS OF KILLING YOURSELF IN THE PAST MONTH?: NO
4. HAVE YOU HAD THESE THOUGHTS AND HAD SOME INTENTION OF ACTING ON THEM?: NO
1. IN THE PAST MONTH, HAVE YOU WISHED YOU WERE DEAD OR WISHED YOU COULD GO TO SLEEP AND NOT WAKE UP?: NO
3. HAVE YOU BEEN THINKING ABOUT HOW YOU MIGHT KILL YOURSELF?: NO
6. HAVE YOU EVER DONE ANYTHING, STARTED TO DO ANYTHING, OR PREPARED TO DO ANYTHING TO END YOUR LIFE?: NO

## 2022-12-28 ASSESSMENT — ANXIETY QUESTIONNAIRES
4. TROUBLE RELAXING: NOT AT ALL
GAD7 TOTAL SCORE: 2
7. FEELING AFRAID AS IF SOMETHING AWFUL MIGHT HAPPEN: NOT AT ALL
5. BEING SO RESTLESS THAT IT IS HARD TO SIT STILL: SEVERAL DAYS
3. WORRYING TOO MUCH ABOUT DIFFERENT THINGS: NOT AT ALL
1. FEELING NERVOUS, ANXIOUS, OR ON EDGE: SEVERAL DAYS
GAD7 TOTAL SCORE: 2
2. NOT BEING ABLE TO STOP OR CONTROL WORRYING: NOT AT ALL
6. BECOMING EASILY ANNOYED OR IRRITABLE: NOT AT ALL

## 2022-12-28 ASSESSMENT — PAIN SCALES - GENERAL: PAINLEVEL: NO PAIN (0)

## 2022-12-28 ASSESSMENT — PATIENT HEALTH QUESTIONNAIRE - PHQ9: SUM OF ALL RESPONSES TO PHQ QUESTIONS 1-9: 4

## 2022-12-28 NOTE — PROGRESS NOTES
Type Of Assessment: Comprehensive Assessment Update    Referral Source:  [facility] Pre-Liver Transplant Team  MRN: [id number]    DATE OF SERVICE: 2022  Date of previous ROBERT Assessment: 2022 by Kianna Gusman  Patient confirmed identity through two factor verification:  and SSN    PATIENT'S NAME: Dillon Salter  Age: 27 year old  Last 4 SSN: [id number]  Sex: male   Gender Identity: male  Sexual Orientation: Heterosexual  Cultural Background: No, Denies any cultural influences or concerns that need to be considered for treatment  YOB: 1995  Current Address:   [address] [place] [id number]  Patient Phone Number:  [phone]   Patient's E-Mail Contact:  [email or web address]  Funding: [employer]  PMI: [id number]  Emergency Contact: Leticia Salter (mom) [phone]  DAANES information was provided to patient and patient does not want a copy.     Telemedicine Visit: The patient's condition can be safely assessed and treated via synchronous audio and visual telemedicine encounter.    Reason for Telemedicine Visit: Patient has requested telehealth visit  Originating Site (Patient Location): Patient's home   Distant Site (Provider Location): [facility]: [facility]  Consent:  The patient/guardian has verbally consented to: the potential risks and benefits of telemedicine (video visit) versus in person care; bill my insurance or make self-payment for services provided; and responsibility for payment of non-covered services.   Mode of Communication:  Video Conference via [employer]    START TIME: 1:07pm  END TIME: 1:43pm    As the provider I attest to compliance with applicable laws and regulations related to telemedicine.   Dillon Salter was seen for a substance use disorder consult on 2022 by MARC Awad.    Reason for Substance Use Disorder Consult:  Pt was asked to complete an updated ROBERT CA by the [facility] Pre-Liver  "Transplant Team.    Per 7/11/2022 ROBERT CA:  Chief Complaint:    The reason for seeking services at this time is: \" I am 90 days sober all by myself.  I was an alcoholic from 2014 until 2018 and  I went to a rehab in [place] and had 42 days sober.  From July/August 2018-2022, it was on and off hell and only became worse.  April 7th of 2022 until now I have had no alcohol.\"  The problem(s) began at the age of 19. Patient has attempted to resolve these concerns in the past through \"inpatient tx in [place] in 2018 and I completed.\"  Because I have Asperger's/Autism I have severe separation anxiety from family and in particular with my mother.  I will never do inpatient again because of that.\"Patient does not appear to be in severe withdrawal, an imminent safety risk to self or others, or requiring immediate medical attention and may proceed with the assessment interview.    Are you currently having severe withdrawal symptoms that are putting yourself or others in danger? No  Are you currently having severe medical problems that require immediate attention? No  Are you currently having severe emotional or behavioral problems that are putting yourself or others at risk of harm? No    Have you participated in prior substance use disorder evaluations? Yes. When, Where, and What circumstances: 7/11/2022   Have you ever been to detox, inpatient or outpatient treatment for substance related use? List previous treatment: Yes. When, Where, and What circumstances: 24 days in [place].   Have you ever had a gambling problem or had treatment for compulsive gambling? No  Patient does not appear to be in severe withdrawal, an imminent safety risk to self or others, or requiring immediate medical attention and may proceed with the assessment interview.        Substance Age of first use Pattern and duration of use (include amounts and frequency) Date of last use       Withdrawal potential Route of administration   has used Alcohol Age " "18 Per 12/28/22 Update:  No use of alcohol since 4/6/22.    Per 7/11/22 ROBERT CA:  Age [age]- \"daily drinking,  not as bad as the last 4 years, but drinking enough to end up in Rehab.  \" When I fell off the wagon in August of 2018 after 42 days of sobriety up until April 6, I was drinking at least a 12-pack of beer a day or more and sometimes wine or mixed drinks.\"     Prior to the ER visit April 7th, I consumed a lot of alcohol, a variety of beverages such as beer, wine, Rum and I had a little blood pimple I could not stop bleeding.  I probably had a couple of mixed drinks a few bottles of wine and a lot of bottles of beer.\"  Last use: 4/6/22 April 6, 2022 No oral   has used Marijuana    Age [age] Per 12/28/22 Update:  No use since last eval.    Per 7/11/22 ROBERT CA:  \"never seeked it out and never bought it myself.\"  Last use: \"sometime in the last 4 years, but I couldn't tell you.\"   05/2022  1 puff No smoked      has not used Amphetamines             has not used Cocaine/crack              has not used Hallucinogens             has not used Inhalants             has not used Heroin             has not used Other Opiates             has not used Benzodiazepine             has not used Barbiturates             has not used Over the counter meds.             has use Caffeine  Age 2 Per 12/28/22 Update:  Coffee: 1 cup per day  Tea: 1 cup per day    Per 7/11/22 ROBERT CA:  Coffee-\"I like it once in awhile, 2 cups a week.\"  Soda pop and Energy drinks -\"sometimes\"  Last use: 7/11/2022 12/28/2022    No oral   has used Nicotine  Age [age] Per 12/28/22 Update:  No use since last eval.    Per 7/11/22 ROBERT CA:  Smoked in the past, a pack over 3-5 months.  \"but would not call myself a former smoker even\".  Last use: 2020 2020 No smoked   has not used other substances not listed above:  Identify:                 Withdrawal symptoms: Have you had any of the following withdrawal symptoms?  None in the past 30 days.    In the past " "30 days, on a scale of 0-10 (0 least severe to 10 being most severe), how would you rate your cravings?  Alcohol: 0/10    Have you had periods of abstinence?  Yes   What was your longest period? Currently 8 months.  How: \"my baseball cards, my books, my records, my family, my music. I have a lady  in my life.\"    What activities have you engaged in when using alcohol/other drugs that could be hazardous to you or others?  The patient denied engaging in any of the above dangerous activities when using alcohol and/or drugs.    A description of any risk-taking behavior, including behavior that puts the client at risk of exposure to blood-borne or sexually transmitted diseases: none reported    Arrests and legal interventions related to substance use: none reported    A description of how the patient's use affected their ability to function appropriately in a work setting: \"I used to show up drunk.\"    A description of how the patient's use affected their ability to function appropriately in an educational setting: it did not.    Leisure time activities that are associated with substance use: \"any time. Towards the end, 2018-4/6/2022, for the last 4 years, every time you thought it couldn't get worse, it did.\"    Do you think your substance use has become a problem for you? He agrees he has a substance abuse problem.    MEDICAL HISTORY  Physical or medical concerns or diagnoses:   Patient Active Problem List   Diagnosis     Alcoholic cirrhosis of liver with ascites (H)     Diabetes mellitus type 2 in obese (H)     Autism spectrum disorder     Benign essential hypertension     Anxiety and depression     Balanitis     Acute posthemorrhagic anemia     Hepatic encephalopathy     Anemia in other chronic diseases classified elsewhere     Alcohol use disorder, severe, dependence (H)     Hyperglycemia     SIRS (systemic inflammatory response syndrome) (H)     Do you have any current medical treatment needs not being " addressed by inpatient treatment?  NA    Do you need a referral for a medical provider? no    Current medications:   Patient reports current meds as:   Outpatient Medications Marked as Taking for the 12/28/22 encounter (Hospital Encounter) with Marilin Cramer LADC   Medication Sig     bumetanide (BUMEX) 1 MG tablet Take 1 tablet (1 mg) by mouth daily     calcium carbonate-vitamin D (OSCAL) 500-5 MG-MCG tablet Take 1 tablet by mouth 2 times daily (with meals) Take one tab once in AM and once in PM with meals     fluconazole (DIFLUCAN) 150 MG tablet Take 150 mg by mouth daily as needed Patient to use for 7 days if yeast infection flare up.     FLUoxetine (PROZAC) 20 MG capsule      folic acid (FOLVITE) 1 MG tablet Take 1 tablet (1 mg) by mouth daily     gabapentin (NEURONTIN) 100 MG capsule      hydrOXYzine (ATARAX) 25 MG tablet Take 25 mg by mouth     insulin aspart (NOVOLOG FLEXPEN) 100 UNIT/ML pen 1 unit per 10 grams of carb, plus additional units based on following scale   For Pre-Meal  - 164 give 1 unit. For Pre-Meal  - 189 give 2 units. For Pre-Meal  - 214 give 3 units. For Pre-Meal  - 239 give 4 units. For Pre-Meal  - 264 give 5 units. For Pre-Meal  - 289 give 6 units. For Pre-Meal  - 314 give 7 units. For Pre-Meal  - 339 give 8 units. For Pre-Meal  - 364 give 9 units.  For Pre-Meal BG greater than or equal to 365 give 10 units     insulin glargine (LANTUS PEN) 100 UNIT/ML pen Inject 20 Units Subcutaneous 2 times daily     lactulose (CHRONULAC) 10 GM/15ML solution Take 15-30 mLs (10-20 g) by mouth 2 times daily     magnesium oxide 400 MG CAPS      multivitamin, therapeutic (THERA-VIT) TABS tablet Take 1 tablet by mouth in the morning.     oxyCODONE (ROXICODONE) 5 MG tablet Take 5 mg by mouth every 4 hours as needed for severe pain     pantoprazole (PROTONIX) 40 MG EC tablet Take 1 tablet (40 mg) by mouth daily     rifaximin (XIFAXAN) 550 MG TABS  "tablet Take 1 tablet (550 mg) by mouth 2 times daily     spironolactone (ALDACTONE) 100 MG tablet Take 1 tablet (100 mg) by mouth daily     thiamine (B-1) 100 MG tablet Take 1 tablet (100 mg) by mouth in the morning.     vitamin D2 (ERGOCALCIFEROL) 85906 units (1250 mcg) capsule Take 1 capsule (50,000 Units) by mouth once a week for 8 doses After 8 weeks, move to daily dose of 2,000 international unit(s) daily     [START ON 2/8/2023] vitamin D3 (CHOLECALCIFEROL) 50 mcg (2000 units) tablet Take 1 tablet (50 mcg) by mouth daily Take one tablet daily     Are you pregnant? NA, Male    Do you have any specific physical needs/accommodations? No    MENTAL HEALTH HISTORY:  Have you ever had  hospitalizations or treatment for mental health illness: No    Mental health history, including diagnosis and symptoms, and the effect on the client's ability to function: Pt describes himself as having \"high functioning\" autism. In the 4th grade, he was dx with Asperger. Pt has a history of depression and anxiety and he is prescribed prozac.     Current mental health treatment including psychotropic medication needed to maintain stability: (Note: The assessment must utilize screening tools approved by the commissioner pursuant to section 245.4863 to identify whether the client screens positive for co-occurring disorders): none reported.    GAIN-SS Tool:  When was the last time that you had significant problems... 7/11/2022 12/28/2022   with feeling very trapped, lonely, sad, blue, depressed or hopeless about the future? Past month 2 to 12 months ago   with sleep trouble, such as bad dreams, sleeping restlessly, or falling asleep during the day? Past Month Past Month   with feeling very anxious, nervous, tense, scared, panicked or like something bad was going to happen? Past month Past month   with becoming very distressed & upset when something reminded you of the past? 2 to 12 months ago Never   with thinking about ending your life " "or committing suicide? Never Never     When was the last time that you did the following things 2 or more times? 7/11/2022 12/28/2022   Lied or conned to get things you wanted or to avoid having to do something? Never Never   Had a hard time paying attention at school, work or home? Past month 2 to 12 months ago   Had a hard time listening to instructions at school, work or home? 1+ years ago 1+ years ago   Were a bully or threatened other people? Never Never   Started physical fights with other people? Never Never     Have you ever been verbally, emotionally, physically or sexually abused?   No    Family history of substance use and misuse: mother has abused RX medication and alcohol in the past and his father has abused alcohol in the past.    The patient's desire for family involvement in the treatment program: \"no.\"  Level of family support: \"amazing. Outstanding.\"    Social network in relation to expected support for recovery: no history of AA meetings.    Are you currently in a significant relationship? Yes, he has a lady friend.    Do you have any children (include living arrangements/custody/contact)?:  no    What is your current living situation? He lives with his parents.    Are you employed/attending school? no    SUMMARY:  Ability to understand written treatment materials: No  Ability to understand patient rules and patient rights: No  Does the patient recognize needs related to substance use and is willing to follow treatment recommendations: Yes  Does the patient have an opioid use disorder:  does not have a history of opiate use.    ASAM Dimension Scale Ratings:  Dimension 1: 0 Client displays full functioning with good ability to tolerate and cope with withdrawal discomfort. No signs or symptoms of intoxication or withdrawal or resolving signs or symptoms.  Dimension 2: 1 Client tolerates and joni with physical discomfort and is able to get the services that the client needs.  Dimension 3: 1 Client " has impulse control and coping skills. Client presents a mild to moderate risk of harm to self or others or displays symptoms of emotional, behavioral or cognitive problems. Client has a mental health diagnosis and is stable. Client functions adequately in significant life areas.  Dimension 4: 0 Client is cooperative, motivated, ready to change, admits problems, committed to change, and engaged in treatment as a responsible participant.  Dimension 5: 3 Client has poor recognition and understanding of relapse and recidivism issues and displays moderately high vulnerability for further substance use or mental health problems. Client has few coping skills and rarely applies coping skills.  Dimension 6: 3 Client is not engaged in structured, meaningful activity and the client's peers, family, significant other, and living environment are unsupportive, or there is significant criminal justice system involvement.    Category of Substance Severity (ICD-10 Code / DSM 5 Code)     Alcohol Use Disorder Severe  (10.20) (303.90)   Cannabis Use Disorder The patient does not meet the criteria for a Cannabis use disorder.   Hallucinogen Use Disorder The patient does not meet the criteria for a Hallucinogen use disorder.   Inhalant Use Disorder The patient does not meet the criteria for an Inhalant use disorder.   Opioid Use Disorder The patient does not meet the criteria for an Opioid use disorder.   Sedative, Hypnotic, or Anxiolytic Use Disorder The patient does not meet the criteria for a Sedative/Hypnotic use disorder.   Stimulant Related Disorder The patient does not meet the criteria for a Stimulant use disorder.   Tobacco Use Disorder The patient does not meet the criteria for a Tobacco use disorder.   Other (or unknown) Substance Use Disorder The patient does not meet the criteria for a Other (or unknown) Substance use disorder.     A problematic pattern of alcohol/drug use leading to clinically significant impairment or  distress, as manifested by at least two of the following, occurring within a 12-month period:    1.) Alcohol/drug is often taken in larger amounts or over a longer period than was intended.  Met for Alcohol.  2.) There is a persistent desire or unsuccessful efforts to cut down or control alcohol/drug use.  Met for Alcohol.  3.) A great deal of time is spent in activities necessary to obtain alcohol, use alcohol, or recover from its effects.  Met for Alcohol.  4.) Craving, or a strong desire or urge to use alcohol/drug.  Met for Alcohol.  7.) Important social, occupational, or recreational activities are given up or reduced because of alcohol/drug use.  Met for Alcohol.  9.) Alcohol/drug use is continued despite knowledge of having a persistent or recurrent physical or psychological problem that is likely to have been caused or exacerbated by alcohol.  Met for Alcohol.  10.) Tolerance, as defined by either of the following: A need for markedly increased amounts of alcohol/drug to achieve intoxication or desired effect..  Met for Alcohol.  11.) Withdrawal, as manifested by either of the following: The characteristic withdrawal syndrome for alcohol/drug (refer to Criteria A and B of the criteria set for alcohol/drug withdrawal). Met for Alcohol.     Specify if: In early remission:  After full criteria for alcohol/drug use disorder were previously met, none of the criteria for alcohol/drug use disorder have been met for at least 3 months but for less than 12 months (with the exception that Criterion A4,  Craving or a strong desire or urge to use alcohol/drug  may be met).     In sustained remission:   After full criteria for alcohol use disorder were previously met, non of the criteria for alcohol/drug use disorder have been met at any time during a period of 12 months or longer (with the exception that Criterion A4,  Craving or strong desire or urge to use alcohol/drug  may be met).     Specify if:   This additional  specifier is used if the individual is in an environment where access to alcohol is restricted.    Mild: Presence of 2-3 symptoms  Moderate: Presence of 4-5 symptoms  Severe: Presence of 6 or more symptoms    Collateral information:   [facility] EMR:  The patient's medical record at [facility] was reviewed and the information contained in the medical record supported the patient's account of his chemical use history and chemical use consequences.    Recommendations:   1)  Complete an Intensive Outpatient CD Treatment Program.  2)  Abstain from all mood-altering chemicals unless prescribed by a licensed provider.   3)  You are strongly encouraged to attend one weekly sober support group meeting, such as 12 step based (AA/NA), SMART Recovery, Health Realizations, Refuge Recovery, [facility], [place] Recovery Connection meetings.     4)  Follow all the recommendations of your treatment/medical providers.  5)  It is recommended to continue to provide ongoing PEth/ETG testing per transplant team guidelines.    Clinical Substantiation:    Pt reports a long history of alcohol use until he quit on 4/7/22. He attended one inpatient ROBERT treatment in 2018, and remained sober for 18 days afterwards. Pt would benefit from learning additional sober coping skills.    Referrals/ Alternatives:  [facility] IOP: [phone]     ROBERT consult completed by: MARC Awad.  Phone Number: [phone]  E-mail Address: [email or web address] Department  [address] [place] [address]     *Due to regulation of Title 42 of the Code of Federal Regulations (CFR) Part 2: Confidentiality laws apply to this note and the information wherein.  Thus, this note cannot be copy and pasted into any other health care staff's note nor can it be included in general medical records sent to ANY outside agency without the  patient's written consent.

## 2022-12-28 NOTE — TELEPHONE ENCOUNTER
12/28/2022  Pt completed his ROBERT CA today. He is being referred to Virtual IOP at [facility]. Pt was informed that Leilani Norman (IOP Coordinator) would contact him. He requested that Leilani contact his mom, Leticia, to set up a start time.    [facility] Assessment ID: [id number]    Recommendations:   1)  Complete an Intensive Outpatient CD Treatment Program.  2)  Abstain from all mood-altering chemicals unless prescribed by a licensed provider.   3)  You are strongly encouraged to attend one weekly sober support group meeting, such as 12 step based (AA/NA), SMART Recovery, Health Realizations, Refuge Recovery, [facility], [place] Recovery Connection meetings. 4)  Follow all the recommendations of your treatment/medical providers.  5)  It is recommended to continue to provide ongoing PEth/ETG testing per transplant team guidelines.     Clinical Substantiation:    Pt reports a long history of alcohol use until he quit on 4/7/22. He attended one inpatient ROBERT treatment in 2018, and remained sober for 18 days afterwards. Pt would benefit from learning additional sober coping skills.     Referrals/ Alternatives:  [facility] IOP: [phone]

## 2022-12-30 ENCOUNTER — TELEPHONE (OUTPATIENT)
Dept: TRANSPLANT | Facility: CLINIC | Age: 27
End: 2022-12-30

## 2022-12-30 NOTE — TELEPHONE ENCOUNTER
Transplant Social Work Services Phone Call      Data: Dillon is in evaluation to be a potential live transplant recipient.   Intervention: Sw attempted to call patient to review ROBERT assessment recommendations and to offer ongoing support, as needed. Pt not available at this time.   Assessment: Deferred.   Education provided by SW: Deferred.   Plan: Primary Sw will continue to provide ongoing support and assist with resources, as necessary.     Zuleika Weems, SW  SOT/BMT/CF Float      Covering for:   CHRISTIAN Robledo, [facility]  Liver Transplant   Phone [phone]  Pager [phone]

## 2023-01-02 ENCOUNTER — TELEPHONE (OUTPATIENT)
Dept: BEHAVIORAL HEALTH | Facility: CLINIC | Age: 28
End: 2023-01-02

## 2023-01-02 NOTE — TELEPHONE ENCOUNTER
Per client request, attempted to contact Leticia to schedule CD IOP. LM x1 to have her return call and schedule appointment

## 2023-01-11 ENCOUNTER — DOCUMENTATION ONLY (OUTPATIENT)
Dept: OTHER | Facility: CLINIC | Age: 28
End: 2023-01-11

## 2023-01-11 ENCOUNTER — DOCUMENTATION ONLY (OUTPATIENT)
Dept: TRANSPLANT | Facility: CLINIC | Age: 28
End: 2023-01-11
Payer: COMMERCIAL

## 2023-01-11 DIAGNOSIS — K70.31 ALCOHOLIC CIRRHOSIS OF LIVER WITH ASCITES (H): Primary | ICD-10-CM

## 2023-01-11 NOTE — PROGRESS NOTES
I received Dillon's health care directive (original).  A copy was made and sent to Scarlet Pulido for their review.  The original was mailed back to patient.  Patient notified via [employer].        CHRISTIAN Robledo, [facility]  Liver Transplant   Phone [phone]

## 2023-01-16 ENCOUNTER — HOSPITAL ENCOUNTER (OUTPATIENT)
Dept: BEHAVIORAL HEALTH | Facility: CLINIC | Age: 28
Discharge: HOME | End: 2023-01-16
Payer: COMMERCIAL

## 2023-01-16 ENCOUNTER — TELEPHONE (OUTPATIENT)
Dept: BEHAVIORAL HEALTH | Facility: CLINIC | Age: 28
End: 2023-01-16

## 2023-01-16 PROCEDURE — H2035 A/D TX PROGRAM, PER HOUR: HCPCS | Mod: GT,95

## 2023-01-16 PROCEDURE — H2035 A/D TX PROGRAM, PER HOUR: HCPCS | Mod: HQ,GT,95

## 2023-01-16 NOTE — TELEPHONE ENCOUNTER
----- Message from MARC Chan sent at 1/16/2023  1:59 PM CST -----  Regarding: Scheduling  Please Schedule    EYAL ROONEY [[id number]]  Location of program: [facility]  Date: 1/18/23  Time: 12:00PM  Provider: ROBERT ROTHMAN MIXED  Length of appt: 60  Visit type:  Zoom   Billing Type: part of program     Thank You!  Noa Bermudez

## 2023-01-16 NOTE — PROGRESS NOTES
Individual Session Summary   START TIME: 10:30AM   END TIME: 12:22PM   Duration: 2 Hours    Video Visit:      Provider verified identity through the following two step process.  Patient provided:  Patient was verified at admission/transfer    Telemedicine Visit: The patient's condition can be safely assessed and treated via synchronous audio and visual telemedicine encounter.      Reason for Telemedicine Visit: Patient has requested telehealth visit    Originating Site (Patient Location): Patient's home    Distant Site (Provider Location): Provider Remote Setting- Home Office    Consent:  The patient/guardian has verbally consented to: the potential risks and benefits of telemedicine (video visit) versus in person care; bill my insurance or make self-payment for services provided; and responsibility for payment of non-covered services.     Patient would like the video invitation sent by:  Send to e-mail at: [email or web address]    Mode of Communication:  Video Conference via Medical Zoom    Distant Location (Provider):  Off-site    As the provider I attest to compliance with applicable laws and regulations related to telemedicine.    Data:  Met with client on this date for a Service Initiation appointment. The first 50min were spent on speaker phone with client and his mother as they were unable to navigate required online applications needed to participate virtually. Counselor assisted them in downloading zoom, establishing an email address, downloading Google Chrome, and signing in to the virtual session. We then proceeded with S.I questions. Client asked questions relating to the group process and group sessions/expectations. He completed WILDA-2 scoring 0, PHQ-2 scoring 1, and PROMIS 10, scoring 24. We discussed triggers including explaining to client definition of trigger vs a craving. Client says he would like some time to think more about triggers as he struggled to identify any people/places/things that tend  "to lead to a \"thought\" relating to drinking. Counselor did share several examples to help him better understand the topic. He was asked if there are any type of behaviors or emotions he tends to struggle coping with and he was unable to list any. Counselor briefly explained the idea of mindfulness and informed client he will be learning and practicing a lot of mindfulness skills while in the program. Client was informed that he is to complete the goals list and safety plan that he received via email. Which he agreed to. Client denied having any further questions at this time.     Intervention: Q and A, assessments/questionair, intake paperwork, MI, DBT, client centered, and building report through taking time to help client and mother figure out how to access zoom sessions.     Assessment: Client listened well and appeared motivated to participate in the treatment process. Clients mother was in the background for the session. I made sure to verbalize the importance of privacy and that he needs to find a private spot where no one can see or hear what is going on during the session.      Plan. Client will complete goals list, safety plan, and intake documents. He will start group at 3pm tonight and meet with ROBERT counselor at 12pm on 1/18 to develop his treatment plan.     Noa Bermudez, BS, LADC  "

## 2023-01-16 NOTE — PROGRESS NOTES
Client:  Dillon Salter  MRN: [id number]    Comprehensive Assessment UPDATE/IS/[facility] Re-Assess   Comprehensive Assessment Update: 2023    Comprehensive assessment dated 22 was reviewed and updates are as follows: No changes   Reason for admission today:  I want to be here because I would like professional help to get a new liver so I can continue to live the healthy live I have envisioned for myself. 2022 found out liver failure. Inpatient treatement for 24 days in 2018 able to stay clean for 14 days after.     Dates of last use and substance(s) used:  2022 - alcohol. All [facility] tests have shown negative for alcohol.   Patient does not have a history of opiate use.    Safety concerns: Denies        Other:  Substantial  Biomedical issues: Cirrhosis of liver with ascites, type II diabetes, hypertension, anemia, haptic encephalopathy, hyperglycemia, SIRS Taking multiple biomedical medications.     Health Screening:  Given patient's past history, a medication, and physical condition, is there a fall risk?  No   Does the patient have any pain? Yes -  Pain ratin/10      Describe pain:  Word Description: Muscle sores in calves and butt due muscle loss  When did it first begin? 2022  How long does each episode last?: Varies  What causes or worsens it?:  Not using walking aid when going to the store  What relieves or lessens it?:  I take tylenol,ibuprophren, resting.   Would like this pain addressed during your stay: No  Staff have requested client inform staff of any new or different pain issue(s) that arise during their treatment stay: NA     Is the patient on a special diet? If yes, please explain: Yes (diabetic. High protien, low sodium)  Does the patient have any concerns regarding your nutritional status? If yes, please explain: no  Has the patient had any appetite changes in the last 3 months?  Yes, eating more healthy.  Has the patient had any weight loss or weight gain in the last 3  months? No  Has the patient have a history of an eating disorder or been over-eating, avoiding meals, or inducing vomiting?  Yes overeating, obese says he lost 100 lbs in the past year.  Does the patient have any dental concerns? (Problems with teeth, pain, cavities, braces)?  YES Because of illness, gums bleed easily. Last dentist a couple years.  Are immunizations up to date?  Yes  Any recent exposure to TB, Hepatitis, Measles, or Strep?  No  Client's BMI is NA.  Client informed of BMI?  no Unkown   Above,  General nutrition education    Dimension Scale Ratings:    Dimension 1: 0 Client displays full functioning with good ability to tolerate and cope with withdrawal discomfort. No signs or symptoms of intoxication or withdrawal or resolving signs or symptoms.    Dimension 2: 2 Client has difficulty tolerating and coping with physical problems or has other biomedical problems that interfere with recovery and treatment. Client neglects or does not seek care for serious biomedical problems.    Dimension 3: 1 Client has impulse control and coping skills. Client presents a mild to moderate risk of harm to self or others or displays symptoms of emotional, behavioral or cognitive problems. Client has a mental health diagnosis and is stable. Client functions adequately in significant life areas.    Dimension 4: 0 Client is cooperative, motivated, ready to change, admits problems, committed to change, and engaged in treatment as a responsible participant.    Dimension 5: 3 Client has poor recognition and understanding of relapse and recidivism issues and displays moderately high vulnerability for further substance use or mental health problems. Client has few coping skills and rarely applies coping skills.    Dimension 6: 3 Client is not engaged in structured, meaningful activity and the client's peers, family, significant other, and living environment are unsupportive, or there is significant criminal justice system  involvement.    Initial Service Plan (ISP)    Immediate health, safety, and preliminary service needs identified and plan includes the following based on available information from clients, referral sources, and collateral information.    Safety (SI, SIB, suicide attempts, aggressive behaviors):  Denies  Recommended that patient call 911 or go to the local ED should there be a change in any of these risk factors.     Health:  Client does NOT have health issues that would impede participation in treatment. He denies that he would have any issues attending groups via zoom    Transportation: Client needs transportation arranged. His mother normally transports him     Other:  Denies    Patient has the following barriers to participating in services:  Has routine [facility] appointments .  These will be addressed by His mother will make sure to schedule appointments around group time. .    Vulnerable Adult Assessment    Does the patient possess a physical or mental infirmity or other physical, mental, or emotional dysfunction?  No. Patient is not a vulnerable adult.    This patient is not a functional Vulnerable Adult according to [place] Statute 626.5572 subdivision 21.      Treatment suggestions for client for the time period until the    initial treatment planning session:  Complete goals list and safety plan    [place]Donald Re-Assessment:     Have you ever wished you were dead or that you could go to sleep and not wake up? Lifetime?  No   Past Month?  No     Have you actually had any thoughts of killing yourself?  Lifetime?  No   Past Month?  No     Have you been thinking about how you might do this? Lifetime?  No   Past Month?  No     Have you had these thoughts and had some intention of acting on them?  Lifetime?  No   Past Month?  No     Have you started to work out the details of how to kill yourself?  Lifetime?  No   Past Month?  No     Do you intend to carry out this plan?   No     When you have the thoughts  how long do they last?   N/A    Are there things - anyone or anything (ie Family, Hinduism, pain of death) that stopped you from wanting to die or acting on thoughts of suicide?   Does not apply        2008  [employer]  Used with permission by Rdaha Church, PhD.      Noa Bermudez, [facility]       1/16/2023     12:24 PM

## 2023-01-16 NOTE — TELEPHONE ENCOUNTER
----- Message from MARC Chan sent at 1/16/2023  1:58 PM CST -----  Regarding: Scheduling  Please Schedule    EYAL ROONEY [[id number]]  Location of program: [place]  Start Date: 1/16/23    Time: 3:00 PM - 5:00 PM M, W, TH and            3:00 PM - 6:00 PM Tues   Provider: ROBERT ROTHMAN PHASE1 MIXED ([id number])   Number of visits: 32 sessions  Length of appt: 120 minutes on M, W, TH and                180 minutes on Tues.    Visit type:  Zoom   Billing Type: part of program     Thank You!  Noa Bermudez

## 2023-01-17 ENCOUNTER — HOSPITAL ENCOUNTER (OUTPATIENT)
Dept: BEHAVIORAL HEALTH | Facility: CLINIC | Age: 28
Discharge: HOME | End: 2023-01-17
Payer: COMMERCIAL

## 2023-01-17 PROCEDURE — H2035 A/D TX PROGRAM, PER HOUR: HCPCS | Mod: HQ,GT,95 | Performed by: SOCIAL WORKER

## 2023-01-17 NOTE — PROGRESS NOTES
Comprehensive Assessment Summary     Based on client interview, review of previous assessments and   comprehensive assessment interview the following diagnosis and recommendations are:     Patient: Dillon Salter  MRN; [id number]   : 1995  Age: 27 year old Sex: male       Client meets criteria for:  303.90 Alcohol Dependence In early remission    Dimension One: Acute Intoxication/Withdrawal Potential     Ratin  Client reports history of problematic alcohol use starting at age 18 up till 20, with having one 42 day period of sobriety in 2018.  He reports LDU being 22. He denies any current withdrawal symptoms nor were any signs or symptoms of intoxication reported or observed.     Dimension Two: Biomedical Condition and Complications    Ratin   Client reports the following biomedical issues/concerns: Cirrhosis of liver with ascites, type II diabetes, hypertension, anemia, haptic encephalopathy, hyperglycemia, SIRS Taking multiple biomedical medications. He is working with liver transplant team towards the goal of obtaining a kidney transfer. Client has multiple doctors and specialists to address his biomedical concerns. Client may struggle to access medical services as needed, however his mother takes care of him and is able to access services as needed.     Dimension Three: Emotional/Behavioral/Cognitive Conditions & Complications  Ratin    Client reports the following mental health diagnosis: Autism spectrum disorder , anxiety, and depression. He also verbalizes diagnosis of Aspergers. Client submitted to WILDA-2 scoring 1, PHQ-2 scoring 0, and PROMIS 10 scoring 24. He denies any mental monse treatments or hospitalizations. He also denies any history or current thoughts of SI or harm to self or others. Client appears to lack insight into identifying mental monse symptoms as well as lacks healthy coping strategies.   .   Dimension Four: Treatment Acceptance/Resistance     Rating:   "0  Client reports motivation for treatment being: I want to be here because I would like professional help to get a new liver so I can continue to live the healthy live I have envisioned for myself. He reports ability to abstain from alcohol since being informed of Liver problems. He verbalizes willingness to complete any/all recommendations of and expectations of the program. His behaviors will be monitored to see if they corollate with his words.     Dimension Five: Continued Use/Relaspe Prevention     Rating:  3  Client reports one previous inpatient treatment stating he was able to abstain from alcohol for 14 days upon discharge. He explains circumstances around his return to use relating to his mothers physical health and being worried. He stated once he found out she was going to be ok, he drank. Client acknowledges longest period of sobriety being 42 days, of which more than half of that time was spent in structured setting. Client struggles to identify any triggers or high risk situations as he states, \"I have no desire to drink\". Client remains at high risk for relapse for reasons including but not limiting to his history of continued use despite negative consequences, lack of healthy strategies to cope with mental and chemical health symptoms, unable to identify triggers or symptoms that could lead to increased risk of relapse, and lacks any type of structured routine activities.     Dimension Six: Recovery Environment     Rating: 3     Client resides with both parents acknowledging they are very supportive and helpful to his recovery. Client is on disability and he is mother aids him with completing day to day responsibilities. He reports having a \"Lady friend\" that is supportive. He is unemployed and not involved in any time of education, spiritual, recreational, or community centered activities. He lacks structure and peer support network. Client does not have a license and he denies any criminal " justice history.     I have reviewed the information on the assessment, psychosocial and medical history and checklist:        the following changes have been made  Dimension 2 rating was changed to a 2 as he is able to seek medical services as needed and is following thru with doctor appointments and recommendations.    Noa Bermudez, YAMILKA, LADC

## 2023-01-17 NOTE — ADDENDUM NOTE
Encounter addended by: Noa Bermudez LADC on: 1/17/2023 1:54 PM   Actions taken: Clinical Note Signed

## 2023-01-17 NOTE — ADDENDUM NOTE
Encounter addended by: Noa Bermudez LADC on: 1/17/2023 9:57 AM   Actions taken: Charge Capture section accepted

## 2023-01-17 NOTE — GROUP NOTE
Group Therapy Documentation    PATIENT'S NAME: Dillon Salter  MRN:   [id number]  [facility]   1995  [id number]  DATE OF SERVICE: 23  START TIME:  3:00 PM  END TIME:  5:00 PM  FACILITATOR(S): Noa Bermudez LADC  TOPIC: BEH Group Therapy  Video Visit:      Provider verified identity through the following two step process.  Patient provided:  Patient is known previously to provider    Telemedicine Visit: The patient's condition can be safely assessed and treated via synchronous audio and visual telemedicine encounter.      Reason for Telemedicine Visit: Patient has requested telehealth visit    Originating Site (Patient Location): Patient's home    Distant Site (Provider Location): [facility] Outpatient Setting: [place]    Consent:  The patient/guardian has verbally consented to: the potential risks and benefits of telemedicine (video visit) versus in person care; bill my insurance or make self-payment for services provided; and responsibility for payment of non-covered services.     Patient would like the video invitation sent by:  Send to e-mail at: No e-mail address on record    Mode of Communication:  Video Conference via Medical Zoom    Distant Location (Provider):  On-site    As the provider I attest to compliance with applicable laws and regulations related to telemedicine.    Number of patients attending the group:  7  Group Length:  2 Hours    Group Therapy Type: Addiction and Life skill(s)    Summary of Group / Topics Discussed:    Communication, Coping Skills/Lifestyle Managemet, Choices in Recovery, Leisure Exploration/Use of Leisure Time, Mindfulness, Proper Nutrition & Exercise, Self-Care Activities, and Sleep Hygiene    Clients participated in introduction questions to help introduce themself to new client and vice versa. Clients participated in 10 minute mindfulness activity relating to challenging clients observation skills by seeing multiple sets of popular  "franchise logos, with one logo displaying slight changes, to see if they can identify which one is correct Afterwards, clients reflected on their experience. Clients then took turns answering check in questions relating to updates, progress, and concerns relating to each of the 6 dimensions.       Group Attendance:  Attended group session    Patient's response to the group topic/interactions:  cooperative with task, discussed personal experience with topic and expressed understanding of topic    Patient appeared to be Actively participating, Attentive and Engaged.        Client specific details:  Client listened while peers introduced themself to client and client shared information about himself. Client stated \"I wasn't thinking about anything else\", when participating in the activity. He shared the following check in answers: No change is LDU. Endorses the following biomedical issues/concerns: Aches and pains in legs. He rates the following on a scale of 0-10 (10 being highest), Depression 1-2,  Anxiety 2-3 . Client was unable to identify any mental health symptoms despite counselor sharing multiple examples. He expresses coping by: Staying with family, remind myself that \"if I drink, I'll be dead\". He rated his Motivation at an 11 out of 10 (10 being highest). Client rates cravings at 1 out of 10 (10 being highest). He reported triggers being: None stating \"It has been awhile, I have developed a routine\". He coped by: \"Knowing accountability\". Client did not attend a self help meeting this past week.  He participated in the following recreational activities: Organized his room and played guitar. No Concerns reported at this time. Client listened respectfully to others peers while they discussed their check in answers. This group focused on all 6 dimensions as clients discussed progress, updates, and concerns relating to all dimensions. Also dimension 3 for mindfulness activity.    Plan: Client will work on " treatment assignment to present in group on Wed.  .

## 2023-01-18 ENCOUNTER — HOSPITAL ENCOUNTER (OUTPATIENT)
Dept: BEHAVIORAL HEALTH | Facility: CLINIC | Age: 28
Discharge: HOME | End: 2023-01-18
Attending: FAMILY MEDICINE
Payer: COMMERCIAL

## 2023-01-18 ENCOUNTER — HOSPITAL ENCOUNTER (OUTPATIENT)
Dept: BEHAVIORAL HEALTH | Facility: CLINIC | Age: 28
Discharge: HOME | End: 2023-01-18
Payer: COMMERCIAL

## 2023-01-18 PROCEDURE — H2035 A/D TX PROGRAM, PER HOUR: HCPCS | Mod: GT,95

## 2023-01-18 PROCEDURE — H2035 A/D TX PROGRAM, PER HOUR: HCPCS | Mod: HQ,GT,95

## 2023-01-18 NOTE — GROUP NOTE
Group Therapy Documentation    PATIENT'S NAME: Dillon Salter  MRN:   [id number]  [facility]   1995  [id number]  DATE OF SERVICE: 23  START TIME:  3:00 PM  END TIME:  5:00 PM  FACILITATOR(S): Noa Bermudez LADC  TOPIC: BEH Group Therapy  Video Visit:      Provider verified identity through the following two step process.  Patient provided:  Patient is known previously to provider    Telemedicine Visit: The patient's condition can be safely assessed and treated via synchronous audio and visual telemedicine encounter.      Reason for Telemedicine Visit: Patient has requested telehealth visit    Originating Site (Patient Location): Patient's home    Distant Site (Provider Location): [facility] Outpatient Setting: [place]    Consent:  The patient/guardian has verbally consented to: the potential risks and benefits of telemedicine (video visit) versus in person care; bill my insurance or make self-payment for services provided; and responsibility for payment of non-covered services.     Patient would like the video invitation sent by:  Send to e-mail at: No e-mail address on record    Mode of Communication:  Video Conference via Medical Zoom    Distant Location (Provider):  On-site    As the provider I attest to compliance with applicable laws and regulations related to telemedicine. Number of patients attending the group:  7  Group Length:  2 Hours    Group Therapy Type: Addiction and Skills/Education    Summary of Group / Topics Discussed:    Forgiveness, Leisure Exploration/Use of Leisure Time, Meditation/Breathing Exercises, and Mindfulness    Clients participated in a 10 minute guided meditation and reflected on their experience, they answered daily check in questions and discussed any concerns they may have and then shared any completed treatment assignments as well well as shared feedback.         Group Attendance:  Attended group session    Patient's response to the group  "topic/interactions:  cooperative with task, discussed personal experience with topic, expressed readiness to alter behaviors, expressed understanding of topic, gave appropriate feedback to peers and listened actively    Patient appeared to be Actively participating, Attentive and Engaged.        Client specific details:  Client shared the following feedback relating to meditation activity: \"Tami never tried that before and I really enjoyed it\" adding he needs to be open minded when trying new things as he assumed meditations were a waste of time prior to giving it a chance. He shared the following answers to check-in questions: No use episodes, two feelings:Comfortable due to getting more use to the group schedule/routine and motivated as he was able to be more active today due to experiencing less physical pain. Two affirmations: \"I deserve to be at peace, and I am doing what I need to do\". One goal for the next 24 hours: Take it easy but still remain motivated. Any concerns: No. Client listened and shared feedback to two peers that endorsed having concerns. He also listened while peers shared completed treatment assignments and verbalized supportive and encouraging feedback. This group focused on dimension 3 (guided meditation) and 4 (sharing completed treatment assignments).     Plan: Client will take time to relax this evening and remember how good it felt to get things done today, in order to stay motivated tomorrow. He will also complete a treatment assignment to share with group next week.   .      "

## 2023-01-18 NOTE — PROGRESS NOTES
Individual Session Summary   START TIME: 12:08PM  END TIME: 1:15PM   Duration: 67 min    Video Visit:      Provider verified identity through the following two step process.  Patient provided:  Patient is known previously to provider    Telemedicine Visit: The patient's condition can be safely assessed and treated via synchronous audio and visual telemedicine encounter.      Reason for Telemedicine Visit: Patient has requested telehealth visit    Originating Site (Patient Location): Patient's home    Distant Site (Provider Location): [facility] Outpatient Setting: [place]    Consent:  The patient/guardian has verbally consented to: the potential risks and benefits of telemedicine (video visit) versus in person care; bill my insurance or make self-payment for services provided; and responsibility for payment of non-covered services.     Patient would like the video invitation sent by:  Send to e-mail at: [email or web address]    Mode of Communication:  Video Conference via Medical Zoom    Distant Location (Provider):  On-site    As the provider I attest to compliance with applicable laws and regulations related to telemedicine.    Data:  Met with client on this date for an individual session to develop clients treatment plan. He was able to complete his goals list prior to the session, which were added to his treatment plan. We were able identify strategies to promote each goal listed however client expressed feeling overwhelmed and exhausted towards the end of the session and was less eager about adding strategies at that time. Counselor expressed empathy and understanding sharing that we don't have to add anything else at this time and reassured him that we can always add things at a later date. Counselor challenged client to identify at least one mental health symptom or behavior he would like to address while in treatment. He expressed getting angry quickly when others are close minded and judgmental,  especially about something that is important to him. We discussed options for coping with that type of situation and he expressed willingness to practice walking away and taking a couple of deep breathes.     Intervention: Q and A, treatment plan, using examples, MI, DBT, empathy, active listening, and client centered     Assessment: Client seemed to struggle with identifying goals that were relative to the dimension despite being given multiple examples and directions. Client endorses grandiose thoughts of being able to handle high risk situations/triggers. He repeated, multiple times, how motivated he is to stay sober however was not open to individual sessions with a mental health therapist, not open to attending self help groups, and was closed off to accepting any mental health symptoms or triggers.     Plan. Client will begin to practice strategies outlined in his treatment plan. Work on treatment assignments by date discussed, and practice walking away and taking deep breathes, when he starts to feel frustrated or angry.     Noa Bermudez, BS, LADC

## 2023-01-18 NOTE — GROUP NOTE
Group Therapy Documentation    PATIENT'S NAME: Dillon Salter  MRN:   [id number]  [facility]   1995  [id number]  DATE OF SERVICE: 23  START TIME:  3:00 PM  END TIME:  6:00 PM  FACILITATOR(S): Ella Keating LICSW  TOPIC: BEH Group Therapy  Number of patients attending the group:  6  Group Length:  3 Hours    Group Therapy Type: Addiction    Summary of Group / Topics Discussed:    Psychoeducation/Skills Cognitive Defusion and Acceptance (ACT & CBT) IOP  Clients learn key concepts of traditional CBT and build on these by learning three core concepts of third wave therapies (Acceptance, Being Present, Cognitive Defusion) and how to apply these in recovery.     Objective(s):    Increase psychological flexibility  by changing clients  relationships with negative thoughts and emotions and build resilience to cravings and negative moods    Practice 2 skills to detach from negative thoughts and emotions, observe them in action and then choose behaviors that serve clients  personal value system.    Structure (modalities, homework, worksheets, etc):    Psychoeducation on evolution to CBT    Demonstrate and practice in Liquid Mood meditation    Explore Self-Acceptance with positive statements to use each day     Handout on practicing Acceptance    Handout on Cognitive Defusion with thoughts and emotions     Visualizations for cognitive defusion and sitting with emotions    Expected therapeutic outcome(s):     Reduction of ruminating thoughts and enhanced emotional stability    Detachment from negative emotions in order to use these constructively (messengers)     Therapeutic outcome(s) measured by:   Teachback and Group Review and Evaluation    Video Visit:      Provider verified identity through the following two step process.  Patient provided:  Patient was verified at admission/transfer by date of birth and photo in Electronic Record    Telemedicine Visit: The patient's condition can be safely assessed  "and treated via synchronous audio and visual telemedicine encounter.      Reason for Telemedicine Visit: Services only offered telehealth    Originating Site (Patient Location): Patient's home    Distant Site (Provider Location): Provider Remote Setting - Home Office     Consent:  The patient/guardian has verbally consented to: the potential risks and benefits of telemedicine (video visit) versus in person care; bill my insurance or make self-payment for services provided; and responsibility for payment of non-covered services.     Patient would like the video invitation sent by:  Other e-mail: [email or web address]    Mode of Communication:  Video Conference via Medical Zoom    Distant Location (Provider):  Off-site    As the provider I attest to compliance with applicable laws and regulations related to telemedicine.    Group Attendance:  Attended group session    Patient's response to the group topic/interactions:  discussed personal experience with topic and expressed readiness to alter behaviors    Patient appeared to be Attentive and Engaged.        Client specific details:  This was Dillon's first group with this therapist and he shared what brought him to treatment and that his substance(s) of concern is alcohol. His last use date for alcohol is 4/06/22. Dillon is on the autism spectrum but participated very well volunteered to read some material aloud to the group. He told the group a little about his health issues and how he is coping with this. He said he wanted to apologize to the group for yesterday as he was very tired in group. He said, \"we need to accept that we are all imperfect human beings and take it easy and relax.\" He does mindfulness \"on my time and write in my notebook and pray to be humble and patient.\" He also is learning more mindfulness in groups that he enjoys. His stress is at 4.5 due to \"some pain and achiness in my legs\" and he has had no cravings this week and that when he first was " "told he was an alcoholic \"I thought I was a victim\" but now he is more accepting of this and wants to do all her can to recover. He said his favorite activities involve music and writing. WORKSHEET: Dillon enjoyed learning about \"negataive self-talk\" and \"This was a complete confirmation for me that's it's OK to make mistakes, that makes a huge difference for me.\"  Dillon described one way of using cognitive defusion as \"to not care what people think and do the best you can.\" This week he will practice feeling his emotions and added, \"I am happy with the people who are in my [place] right now, that's important.\" This topic completes one of the six MH Skills Groups required under D3 of his Treatment Plan.           "

## 2023-01-18 NOTE — PROGRESS NOTES
Patient Safety Plan Template    Name:   Dillon Salter YOB: 1995 Age:  27 year old MR Number:  [id number]   Step 1: Warning signs (Thoughts, images, mood, situation, behavior) that a crisis may be developin.   Images   2.  Thoughts   3.   Mood   Step 2: Internal coping strategies - Things I can do to take my mind off of my problems without contacting another person (relaxation technique, physical activity):     1.  Listen to my records   2.   Read my books   3.   Call someone I love   Step 3: People and social settings that provide distraction:     1. Name:  Darcy Babcock Phone: [phone]   2. Name:   Ciaran Sheehanbridgergopal Phone:[phone]   3. Place:   Anywhere 4. Place: Anywhere     The one thing that is most important to me and worth living for is: My mother     Step 4: People whom I can ask for help:     1. Name:   Darcy babcock Phone:  [phone]   2. Name:   Ciaran Sheehancindy Phone:   [phone]   3. Name:   Belgica Ellen Phone:   [phone]   Step 5: Professionals or agencies I can contact during a crisis:     1. Clinician Name: Dr. Dilshad Rodrigues   Phone:[phone]   Clinician Pager or Emergency Contact #: [phone]     2. Clinician Name: [facility] Phone:   [phone]   Clinician Pager or Emergency Contact #:   [phone]   3. [facility]: [facility]    Urgent Care Services Address: [address] [place] [address]    Urgent Care Services Phone: [phone]     4. Suicide Prevention Lifeline Phone: [phone]-GHTM ([phone])     Step 6: Making the environment safe:     1.   Alcohol Free   2.   Open Mindedness   Safety Plan Template 2008 Tatiana Paez and Shun Parkinson is reprinted with the express permission of the authors.  No portion of the Safety Plan Template may be reproduced without the express, written permission.  You can contact the authors at [email or web address] or [email or web address].

## 2023-01-19 ENCOUNTER — HOSPITAL ENCOUNTER (OUTPATIENT)
Dept: BEHAVIORAL HEALTH | Facility: CLINIC | Age: 28
Discharge: HOME | End: 2023-01-19
Attending: FAMILY MEDICINE
Payer: COMMERCIAL

## 2023-01-19 PROCEDURE — H2035 A/D TX PROGRAM, PER HOUR: HCPCS | Mod: HQ,GT,95

## 2023-01-19 NOTE — ADDENDUM NOTE
Encounter addended by: Noa Bermudez LADC on: 1/19/2023 11:03 AM   Actions taken: Charge Capture section accepted

## 2023-01-19 NOTE — PROGRESS NOTES
[facility] Weekly Treatment Plan Review    ATTENDANCE for the following date span:  23--- 23    Date     Group Therapy 2.0 hours 3.0 hours    2.0 hours 2.0 hours         Individual Therapy  2.0 hour   1 hour           Family Therapy                 Psychoeducation                 Other (Specify)                    Client did not have any absences this week.     Total # of Phase 1 Group Sessions: 4 (4 total)                              Total # of Phase 2 Group Sessions: 0 (0 total)  Total # of Phase 3 Group Sessions: 0 (0 total)  Total # of 1:1 Sessions:  2  (2 total)    Projected discharge date:  23      Weekly Treatment Plan Review     Treatment Plan initiated on: 23    Dimension1: Acute Intoxication/Withdrawal Potential -   Previous Dimension Ratin  Current Dimension Ratin  Date of Last Use 22  Any reports of withdrawal symptoms - No    23-23: Client denies any use episodes.    Dimension 2: Biomedical Conditions & Complications -   Previous Dimension Ratin  Current Dimension Ratin  Medical Concerns:    Current Medications & Medication Changes:  Current Outpatient Medications   Medication     alcohol swab prep pads     blood glucose (NO BRAND SPECIFIED) test strip     blood glucose monitoring (NO BRAND SPECIFIED) meter device kit     bumetanide (BUMEX) 1 MG tablet     calcium carbonate-vitamin D (OSCAL) 500-5 MG-MCG tablet     fluconazole (DIFLUCAN) 150 MG tablet     FLUoxetine (PROZAC) 20 MG capsule     folic acid (FOLVITE) 1 MG tablet     furosemide (LASIX) 40 MG tablet     gabapentin (NEURONTIN) 100 MG capsule     hydrOXYzine (ATARAX) 25 MG tablet     insulin aspart (NOVOLOG FLEXPEN) 100 UNIT/ML pen     insulin glargine (LANTUS PEN) 100 UNIT/ML pen     insulin pen needle (ULTICARE MICRO) 32G X 4 MM miscellaneous     lactulose (CHRONULAC) 10 GM/15ML solution     magnesium oxide 400 MG CAPS  "    multivitamin, therapeutic (THERA-VIT) TABS tablet     oxyCODONE (ROXICODONE) 5 MG tablet     pantoprazole (PROTONIX) 40 MG EC tablet     rifaximin (XIFAXAN) 550 MG TABS tablet     spironolactone (ALDACTONE) 100 MG tablet     thiamine (B-1) 100 MG tablet     thin (NO BRAND SPECIFIED) lancets     vitamin D2 (ERGOCALCIFEROL) 51545 units (1250 mcg) capsule     [START ON 2023] vitamin D3 (CHOLECALCIFEROL) 50 mcg (2000 units) tablet     No current facility-administered medications for this encounter.     Medication Prescriber:  Gary Rodrigues MD  Taking meds as prescribed? Yes  Medication side effects or concerns:  Denies  Outside medical appointments this week (list provider and reason for visit):  None scheduled    23-23: Client endorses the following biomedical issues/concerns: Aches and pains in legs. He also expressed \"Not feeling like himself during group on Thursday. Clients records indicate that he went to ER on 23 for symptoms of lethargy and stomach pains.       Dimension 3: Emotional/Behavioral Conditions & Complications -   Previous Dimension Ratin  Current Dimension Ratin  PHQ2:   PHQ-2 (  [employer]) 2023   Q1: Little interest or pleasure in doing things 0 3   Q2: Feeling down, depressed or hopeless 0 3   PHQ-2 Score 0 6      GAD2:   WILDA-2 1/15/2023 2023   Feeling nervous, anxious, or on edge 0 1   Not being able to stop or control worrying 0 0   WILDA-2 Total Score 0 1     PROMIS 10-Global Health (all questions and answers displayed):   PROMIS 10 1/15/2023 2023   In general, would you say your health is: Poor -   In general, would you say your quality of life is: Fair -   In general, how would you rate your physical health? Poor -   In general, how would you rate your mental health, including your mood and your ability to think? Fair -   In general, how would you rate your satisfaction with your social activities and relationships? Good -   In general, " please rate how well you carry out your usual social activities and roles Poor -   To what extent are you able to carry out your everyday physical activities such as walking, climbing stairs, carrying groceries, or moving a chair? A little -   How often have you been bothered by emotional problems such as feeling anxious, depressed or irritable? Sometimes -   How would you rate your fatigue on average? Severe -   How would you rate your pain on average?   0 = No Pain  to  10 = Worst Imaginable Pain 4 -   In general, would you say your health is: 1 4   In general, would you say your quality of life is: 2 3   In general, how would you rate your physical health? 1 2   In general, how would you rate your mental health, including your mood and your ability to think? 2 5   In general, how would you rate your satisfaction with your social activities and relationships? 3 2   In general, please rate how well you carry out your usual social activities and roles. (This includes activities at home, at work and in your community, and responsibilities as a parent, child, spouse, employee, friend, etc.) 1 3   To what extent are you able to carry out your everyday physical activities such as walking, climbing stairs, carrying groceries, or moving a chair? 2 3   In the past 7 days, how often have you been bothered by emotional problems such as feeling anxious, depressed, or irritable? 3 2   In the past 7 days, how would you rate your fatigue on average? 4 4   In the past 7 days, how would you rate your pain on average, where 0 means no pain, and 10 means worst imaginable pain? 4 6   Global Mental Health Score 10 14   Global Physical Health Score 8 10   PROMIS TOTAL - SUBSCORES 18 24   Some recent data might be hidden     Mental health diagnosis Autisim spectrum disorder and depression.   Date of last SIB:  Denies  Date of  last SI:  Denies  Date of last HI: Denies  Behavioral Targets:    Risk factors:  Lacks insight into  "addiction/mental health, lacks daily structure  Protective factors:  positive relationships positive family connections  Current MH Assignments:  None at this time    Narrative:  Current Mental Health symptoms include: Client reports Autisim spectrum disorder and depression. Active interventions to stabilize mental health symptoms this week : Walking away and practicing deep breathing when he is feeling frustrated or angry.     23-23: He rates the following on a scale of 0-10 (10 being highest), Depression 1-2,  Anxiety 2-3 . Client was unable to identify any mental health symptoms despite counselor sharing multiple examples. He expresses coping by: Staying with family, remind myself that \"if I drink, I'll be dead\". Client attended mental health group and participated well.     Dimension 4: Treatment Acceptance / Resistance -   Previous Dimension Ratin  Current Dimension Ratin  ROBERT Diagnosis:  Alcohol Use Disorder   303.90 (F10.20) Severe .  in early remission  Commitment to tx process/Stage of change- Contemplation   ROBERT assignments - 10 harmful consequences and Identifying triggers and cues.     Narrative :Client reports motivation for treatment being: I want to be here because I would like professional help to get a new liver so I can continue to live the healthy live I have envisioned for myself. He reports ability to abstain from alcohol since being informed of Liver problems. He verbalizes willingness to complete any/all recommendations of and expectations of the program. His behaviors will be monitored to see if they corollate with his words.     23-23: He rated his Motivation at an 11 out of 10 (10 being highest). Client met with ROBERT counselor to develop his treatment plan. He attended all treatment sessions this week and actively participated.    Dimension 5: Relapse / Continued Problem Potential -   Previous Dimension Rating:  3  Current Dimension Rating:  3  Relapses this " "week -  Denies  Urges to use -  Denies  UA results - No results found for this or any previous visit (from the past 168 hour(s)).  Using ReSet Gini: No: Not at this time    Narrative- Client reports one previous inpatient treatment stating he was able to abstain from alcohol for 14 days upon discharge. He explains circumstances around his return to use relating to his mothers physical health and being worried. He stated once he found out she was going to be ok, he drank. Client acknowledges longest period of sobriety being 42 days, of which more than half of that time was spent in structured setting. Client struggles to identify any triggers or high risk situations as he states, \"I have no desire to drink\". Client remains at high risk for relapse for reasons including but not limiting to his history of continued use despite negative consequences, lack of healthy strategies to cope with mental and chemical health symptoms, unable to identify triggers or symptoms that could lead to increased risk of relapse, and lacks any type of structured routine activities.     1/16/23-1/22/23:Client rates cravings at 1 out of 10 (10 being highest). He reported triggers being: None stating \"It has been awhile, I have developed a routine\". He coped by: \"Knowing accountability\". Client attended all ROBERT groups gaining insight into stages of relapse, sharing treatment assignments and introduction of new peers into the group.     Dimension 6: Recovery Environment -   Previous Dimension Rating:  3  Current Dimension Rating:  3  Family Involvement - Yes  Summarize attendance at family groups and family sessions - NA  Family supportive of treatment?  Yes    Community support group attendance - See below  Recreational activities - See below    Narrative - Client resides with both parents acknowledging they are very supportive and helpful to his recovery. Client is on disability and he is mother aids him with completing day to day " "responsibilities. He reports having a \"Lady friend\" that is supportive. He is unemployed and not involved in any time of education, spiritual, recreational, or community centered activities. He lacks structure and peer support network. Client does not have a license and he denies any criminal justice history.    1/16/23-1/22/23: Client did not attend a self help meeting this past week.  He participated in the following recreational activities: Organized his room and played guitar.    Progress made on transition planning goals: Developed treatment plan and attended all group/individual sessions this week.     Justification for Continued Treatment at this Level of Care:  Lacks insight into addiction and mental health, lacks daily structure or routine, is not involved in any sober activities/support groups, lacks peer support, and healthy coping strategies    Treatment coordination activities this week:  Family (Mother)  Need for peer recovery support referral? No    Discharge Planning:  Target Discharge Date/Timeframe:  6/20/23  Target Phase 2 Start:  3/6/23  Target Phase 3 Start: 4/24/23   Med Mgmt Provider/Appt:  DARBY   Ind therapy Provider/Appt:  JINAD   Family therapy Provider/Appt:  TBD   Other referrals: TBD      Has vulnerable adult status change? No    Interdisciplinary Clinical Supervision including: Mental health professional    Are Treatment Plan goals/objectives effective? Yes  *If no, list changes to treatment plan:    Are the current goals meeting client's needs? Yes  *If no, list the changes to treatment plan.    Client Input / Response: Very verbal during group sessions, states he is getting use to the routine of group hours, and appears to be pushing himself to make sure he is consistent with participating in treatment activities.       *Client agrees with any changes to the treatment plan: N/A  *Client received copy of changes: N/A  *Client is aware of right to access a treatment plan review: Yes "     Noa Bermudez, YAMILKA, [facility][note 3]

## 2023-01-20 NOTE — GROUP NOTE
Group Therapy Documentation    PATIENT'S NAME: Dillon Salter  MRN:   [id number]  :   1995  [id number]  DATE OF SERVICE: 23  START TIME:  3:00 PM  END TIME:  5:00 PM  FACILITATOR(S): Noa Bermudez LADC  TOPIC: BEH Group Therapy  Video Visit:      Provider verified identity through the following two step process.  Patient provided:  Patient is known previously to provider    Telemedicine Visit: The patient's condition can be safely assessed and treated via synchronous audio and visual telemedicine encounter.      Reason for Telemedicine Visit: Patient has requested telehealth visit    Originating Site (Patient Location): Patient's home    Distant Site (Provider Location): Provider Remote Setting- Home Office    Consent:  The patient/guardian has verbally consented to: the potential risks and benefits of telemedicine (video visit) versus in person care; bill my insurance or make self-payment for services provided; and responsibility for payment of non-covered services.     Patient would like the video invitation sent by:  Send to e-mail at: No e-mail address on record    Mode of Communication:  Video Conference via Medical Zoom    Distant Location (Provider):  Off-site    As the provider I attest to compliance with applicable laws and regulations related to telemedicine.     Number of patients attending the group:  8  Group Length:  2 Hours    Group Therapy Type: Addiction, Life skill(s), and Skills/Education    Summary of Group / Topics Discussed:    Coping Skills/Lifestyle Managemet, Choices in Recovery, Meditation/Breathing Exercises, Proper Nutrition & Exercise, and Sleep Hygiene    Clients answered questions to introduce themself to a fellow peer as well as listened to new peer introduce himself to the group. Clients participated in box breathing activity and shared feedback. Clients participated in group discussion on Stages of Relapse, shared feedback, personal early warning  "signs, and coping skills they could use to help cope with these warning signs.      STAGES OF RELAPSE     This topic will give a general overview of stages of Relapse and how they apply to the personal experience of each patient.     Objective(s):       Patient will identify all three stages of relapse        Patient will identify at least one example of early warning signs relating to each stage.        Patient will identify heathier strategies to cope with each stage of relapse.      Structure:       Provide psychoeducation on Stages of Relapse       Utilize white board for visual aid learning       Use teach-back techniques to ensure patients  understanding.       Provide patients with handouts to enhance learning.     Expected therapeutic outcomes:        Understand relapse as an essential and non-linear process.       Be aware of the stages of change so they can help prevent relapse before it occurs.        Learn the importance of self-care and other healthy strategies to help reduce the risk of relapse.      Therapeutic outcome(s) measured by:     Patient s ability to teach-back information learned in group.     Patient s demonstration of learning by identification of personal triggers in each stage of relapse.      Identify coping strategies to reduce risk of relapse.           Group Attendance:  Attended group session    Patient's response to the group topic/interactions:  cooperative with task, discussed personal experience with topic, expressed understanding of topic, listened actively, offered helpful suggestions to peers and verbalizations were off topic    Patient appeared to be Actively participating, Attentive and Engaged.        Client specific details:  Client introduced himself to new group peer as well as listened while new peer introduced himself. He then participated in mindfulness activity stating: \"I liked the one from yesterday better, was able to pay more attention than today's\"  Client " watched a short video introducing the stages of change. We then went through a handout together and discussed each stage in detail as well as discussed early warning signs relating to each stage. Client reported early warning signs for emotional relapse being:Defensiveness, grandiosity, and not asking for help. He stated one thing he can do to cope with early warning signs being: Talking to others and practicing mindfulness. Client was emailed a copy of the handout so he can review it on his own time, if he would like. This group relates to goals in dimension 3 (breathing meditation and emotional relapse warning signs) and dimension 5 (gaining insight into personal triggers and early warning signs of relapse).      Plan: Client will be mindful of signs relating to the stages of relapse, practice being mindful when it comes to grandiose thoughts and asking for help.

## 2023-01-20 NOTE — ADDENDUM NOTE
Encounter addended by: Noa Bermudez LADC on: 1/20/2023 1:03 PM   Actions taken: Charge Capture section accepted

## 2023-01-21 ENCOUNTER — HOSPITAL ENCOUNTER (INPATIENT)
Facility: CLINIC | Age: 28
LOS: 5 days | Discharge: ANOTHER HOSPITAL | DRG: 441 | End: 2023-01-26
Attending: EMERGENCY MEDICINE | Admitting: HOSPITALIST
Payer: COMMERCIAL

## 2023-01-21 ENCOUNTER — APPOINTMENT (OUTPATIENT)
Dept: CT IMAGING | Facility: CLINIC | Age: 28
DRG: 441 | End: 2023-01-21
Attending: EMERGENCY MEDICINE
Payer: COMMERCIAL

## 2023-01-21 DIAGNOSIS — I85.11 SECONDARY ESOPHAGEAL VARICES WITH BLEEDING (H): ICD-10-CM

## 2023-01-21 DIAGNOSIS — K92.1 MELENA: ICD-10-CM

## 2023-01-21 DIAGNOSIS — D64.9 ANEMIA, UNSPECIFIED TYPE: ICD-10-CM

## 2023-01-21 DIAGNOSIS — K76.82 HEPATIC ENCEPHALOPATHY (H): ICD-10-CM

## 2023-01-21 DIAGNOSIS — D69.6 THROMBOCYTOPENIA (H): ICD-10-CM

## 2023-01-21 DIAGNOSIS — D62 ACUTE POSTHEMORRHAGIC ANEMIA: ICD-10-CM

## 2023-01-21 DIAGNOSIS — K92.0 HEMATEMESIS, UNSPECIFIED WHETHER NAUSEA PRESENT: Primary | ICD-10-CM

## 2023-01-21 DIAGNOSIS — K70.31 ALCOHOLIC CIRRHOSIS OF LIVER WITH ASCITES (H): ICD-10-CM

## 2023-01-21 PROBLEM — I10 BENIGN ESSENTIAL HYPERTENSION: Status: ACTIVE | Noted: 2022-04-08

## 2023-01-21 LAB
ABO/RH(D): NORMAL
ALBUMIN SERPL-MCNC: 3.3 G/DL (ref 3.5–5)
ALP SERPL-CCNC: 132 U/L (ref 45–120)
ALT SERPL W P-5'-P-CCNC: 39 U/L (ref 0–45)
AMMONIA PLAS-SCNC: 77 UMOL/L (ref 11–35)
ANION GAP SERPL CALCULATED.3IONS-SCNC: 8 MMOL/L (ref 5–18)
ANTIBODY SCREEN: NEGATIVE
AST SERPL W P-5'-P-CCNC: 74 U/L (ref 0–40)
BASOPHILS # BLD AUTO: 0.1 10E3/UL (ref 0–0.2)
BASOPHILS # BLD AUTO: 0.1 10E3/UL (ref 0–0.2)
BASOPHILS NFR BLD AUTO: 1 %
BASOPHILS NFR BLD AUTO: 1 %
BILIRUB DIRECT SERPL-MCNC: 2.5 MG/DL
BILIRUB SERPL-MCNC: 11.9 MG/DL (ref 0–1)
BUN SERPL-MCNC: 19 MG/DL (ref 8–22)
CALCIUM SERPL-MCNC: 9.8 MG/DL (ref 8.5–10.5)
CHLORIDE BLD-SCNC: 97 MMOL/L (ref 98–107)
CO2 SERPL-SCNC: 27 MMOL/L (ref 22–31)
CREAT SERPL-MCNC: 0.77 MG/DL (ref 0.7–1.3)
EOSINOPHIL # BLD AUTO: 0.1 10E3/UL (ref 0–0.7)
EOSINOPHIL # BLD AUTO: 0.1 10E3/UL (ref 0–0.7)
EOSINOPHIL NFR BLD AUTO: 2 %
EOSINOPHIL NFR BLD AUTO: 2 %
ERYTHROCYTE [DISTWIDTH] IN BLOOD BY AUTOMATED COUNT: 13.2 % (ref 10–15)
ERYTHROCYTE [DISTWIDTH] IN BLOOD BY AUTOMATED COUNT: 13.4 % (ref 10–15)
ETHANOL SERPL-MCNC: <10 MG/DL
GFR SERPL CREATININE-BSD FRML MDRD: >90 ML/MIN/1.73M2
GLUCOSE BLD-MCNC: 179 MG/DL (ref 70–125)
GLUCOSE BLDC GLUCOMTR-MCNC: 125 MG/DL (ref 70–99)
GLUCOSE BLDC GLUCOMTR-MCNC: 97 MG/DL (ref 70–99)
HCT VFR BLD AUTO: 23.7 % (ref 40–53)
HCT VFR BLD AUTO: 24.7 % (ref 40–53)
HGB BLD-MCNC: 8.6 G/DL (ref 13.3–17.7)
HGB BLD-MCNC: 8.7 G/DL (ref 13.3–17.7)
IMM GRANULOCYTES # BLD: 0.1 10E3/UL
IMM GRANULOCYTES # BLD: 0.2 10E3/UL
IMM GRANULOCYTES NFR BLD: 1 %
IMM GRANULOCYTES NFR BLD: 2 %
INR PPP: 1.54 (ref 0.85–1.15)
LYMPHOCYTES # BLD AUTO: 1.6 10E3/UL (ref 0.8–5.3)
LYMPHOCYTES # BLD AUTO: 1.7 10E3/UL (ref 0.8–5.3)
LYMPHOCYTES NFR BLD AUTO: 18 %
LYMPHOCYTES NFR BLD AUTO: 20 %
MAGNESIUM SERPL-MCNC: 1.8 MG/DL (ref 1.8–2.6)
MCH RBC QN AUTO: 37 PG (ref 26.5–33)
MCH RBC QN AUTO: 37.1 PG (ref 26.5–33)
MCHC RBC AUTO-ENTMCNC: 35.2 G/DL (ref 31.5–36.5)
MCHC RBC AUTO-ENTMCNC: 36.3 G/DL (ref 31.5–36.5)
MCV RBC AUTO: 102 FL (ref 78–100)
MCV RBC AUTO: 105 FL (ref 78–100)
MONOCYTES # BLD AUTO: 0.4 10E3/UL (ref 0–1.3)
MONOCYTES # BLD AUTO: 0.4 10E3/UL (ref 0–1.3)
MONOCYTES NFR BLD AUTO: 4 %
MONOCYTES NFR BLD AUTO: 4 %
NEUTROPHILS # BLD AUTO: 6.2 10E3/UL (ref 1.6–8.3)
NEUTROPHILS # BLD AUTO: 6.4 10E3/UL (ref 1.6–8.3)
NEUTROPHILS NFR BLD AUTO: 72 %
NEUTROPHILS NFR BLD AUTO: 73 %
NRBC # BLD AUTO: 0 10E3/UL
NRBC # BLD AUTO: 0 10E3/UL
NRBC BLD AUTO-RTO: 0 /100
NRBC BLD AUTO-RTO: 0 /100
PLATELET # BLD AUTO: 127 10E3/UL (ref 150–450)
PLATELET # BLD AUTO: 129 10E3/UL (ref 150–450)
POTASSIUM BLD-SCNC: 5 MMOL/L (ref 3.5–5)
PROT SERPL-MCNC: 7.8 G/DL (ref 6–8)
RBC # BLD AUTO: 2.32 10E6/UL (ref 4.4–5.9)
RBC # BLD AUTO: 2.35 10E6/UL (ref 4.4–5.9)
SARS-COV-2 RNA RESP QL NAA+PROBE: NEGATIVE
SODIUM SERPL-SCNC: 132 MMOL/L (ref 136–145)
SPECIMEN EXPIRATION DATE: NORMAL
WBC # BLD AUTO: 8.5 10E3/UL (ref 4–11)
WBC # BLD AUTO: 8.8 10E3/UL (ref 4–11)

## 2023-01-21 PROCEDURE — 85025 COMPLETE CBC W/AUTO DIFF WBC: CPT | Performed by: INTERNAL MEDICINE

## 2023-01-21 PROCEDURE — 80053 COMPREHEN METABOLIC PANEL: CPT | Performed by: EMERGENCY MEDICINE

## 2023-01-21 PROCEDURE — 99285 EMERGENCY DEPT VISIT HI MDM: CPT | Mod: 25

## 2023-01-21 PROCEDURE — C9113 INJ PANTOPRAZOLE SODIUM, VIA: HCPCS | Performed by: EMERGENCY MEDICINE

## 2023-01-21 PROCEDURE — 36415 COLL VENOUS BLD VENIPUNCTURE: CPT | Performed by: EMERGENCY MEDICINE

## 2023-01-21 PROCEDURE — 250N000011 HC RX IP 250 OP 636: Performed by: EMERGENCY MEDICINE

## 2023-01-21 PROCEDURE — 82962 GLUCOSE BLOOD TEST: CPT

## 2023-01-21 PROCEDURE — 96365 THER/PROPH/DIAG IV INF INIT: CPT

## 2023-01-21 PROCEDURE — 85610 PROTHROMBIN TIME: CPT | Performed by: EMERGENCY MEDICINE

## 2023-01-21 PROCEDURE — 250N000013 HC RX MED GY IP 250 OP 250 PS 637: Performed by: HOSPITALIST

## 2023-01-21 PROCEDURE — 250N000013 HC RX MED GY IP 250 OP 250 PS 637: Performed by: EMERGENCY MEDICINE

## 2023-01-21 PROCEDURE — 120N000001 HC R&B MED SURG/OB

## 2023-01-21 PROCEDURE — 99223 1ST HOSP IP/OBS HIGH 75: CPT | Performed by: HOSPITALIST

## 2023-01-21 PROCEDURE — 85025 COMPLETE CBC W/AUTO DIFF WBC: CPT | Performed by: EMERGENCY MEDICINE

## 2023-01-21 PROCEDURE — 87040 BLOOD CULTURE FOR BACTERIA: CPT | Performed by: EMERGENCY MEDICINE

## 2023-01-21 PROCEDURE — 86923 COMPATIBILITY TEST ELECTRIC: CPT | Performed by: EMERGENCY MEDICINE

## 2023-01-21 PROCEDURE — 86901 BLOOD TYPING SEROLOGIC RH(D): CPT | Performed by: EMERGENCY MEDICINE

## 2023-01-21 PROCEDURE — 82248 BILIRUBIN DIRECT: CPT | Performed by: EMERGENCY MEDICINE

## 2023-01-21 PROCEDURE — 82077 ASSAY SPEC XCP UR&BREATH IA: CPT | Performed by: EMERGENCY MEDICINE

## 2023-01-21 PROCEDURE — 70450 CT HEAD/BRAIN W/O DYE: CPT

## 2023-01-21 PROCEDURE — C9803 HOPD COVID-19 SPEC COLLECT: HCPCS

## 2023-01-21 PROCEDURE — 82140 ASSAY OF AMMONIA: CPT | Performed by: EMERGENCY MEDICINE

## 2023-01-21 PROCEDURE — 96375 TX/PRO/DX INJ NEW DRUG ADDON: CPT

## 2023-01-21 PROCEDURE — U0005 INFEC AGEN DETEC AMPLI PROBE: HCPCS | Performed by: EMERGENCY MEDICINE

## 2023-01-21 PROCEDURE — 83735 ASSAY OF MAGNESIUM: CPT | Performed by: EMERGENCY MEDICINE

## 2023-01-21 PROCEDURE — 36415 COLL VENOUS BLD VENIPUNCTURE: CPT | Performed by: INTERNAL MEDICINE

## 2023-01-21 PROCEDURE — 250N000012 HC RX MED GY IP 250 OP 636 PS 637: Performed by: HOSPITALIST

## 2023-01-21 RX ORDER — ACETAMINOPHEN 500 MG
500 TABLET ORAL DAILY PRN
Status: DISCONTINUED | OUTPATIENT
Start: 2023-01-21 | End: 2023-01-26 | Stop reason: HOSPADM

## 2023-01-21 RX ORDER — SPIRONOLACTONE 25 MG/1
100 TABLET ORAL AT BEDTIME
Status: DISCONTINUED | OUTPATIENT
Start: 2023-01-21 | End: 2023-01-26 | Stop reason: HOSPADM

## 2023-01-21 RX ORDER — ACETAMINOPHEN 500 MG
500 TABLET ORAL DAILY PRN
Status: ON HOLD | COMMUNITY
End: 2023-03-17

## 2023-01-21 RX ORDER — BUMETANIDE 1 MG/1
1 TABLET ORAL DAILY
Status: DISCONTINUED | OUTPATIENT
Start: 2023-01-21 | End: 2023-01-26 | Stop reason: HOSPADM

## 2023-01-21 RX ORDER — ACETAMINOPHEN 650 MG/1
650 SUPPOSITORY RECTAL EVERY 6 HOURS PRN
Status: DISCONTINUED | OUTPATIENT
Start: 2023-01-21 | End: 2023-01-26 | Stop reason: HOSPADM

## 2023-01-21 RX ORDER — IBUPROFEN 400 MG/1
400 TABLET, FILM COATED ORAL DAILY PRN
Status: DISCONTINUED | OUTPATIENT
Start: 2023-01-21 | End: 2023-01-26

## 2023-01-21 RX ORDER — MULTIVITAMIN,THERAPEUTIC
1 TABLET ORAL DAILY
Status: DISCONTINUED | OUTPATIENT
Start: 2023-01-21 | End: 2023-01-26 | Stop reason: HOSPADM

## 2023-01-21 RX ORDER — FOLIC ACID 1 MG/1
1 TABLET ORAL DAILY
Status: DISCONTINUED | OUTPATIENT
Start: 2023-01-21 | End: 2023-01-26 | Stop reason: HOSPADM

## 2023-01-21 RX ORDER — GABAPENTIN 100 MG/1
100 CAPSULE ORAL DAILY PRN
Status: DISCONTINUED | OUTPATIENT
Start: 2023-01-21 | End: 2023-01-26 | Stop reason: HOSPADM

## 2023-01-21 RX ORDER — POLYETHYLENE GLYCOL 3350 17 G/17G
17 POWDER, FOR SOLUTION ORAL DAILY PRN
Status: DISCONTINUED | OUTPATIENT
Start: 2023-01-21 | End: 2023-01-26 | Stop reason: HOSPADM

## 2023-01-21 RX ORDER — FLUOXETINE 40 MG/1
40 CAPSULE ORAL AT BEDTIME
COMMUNITY
End: 2023-01-21

## 2023-01-21 RX ORDER — ONDANSETRON 4 MG/1
4 TABLET, ORALLY DISINTEGRATING ORAL EVERY 6 HOURS PRN
Status: DISCONTINUED | OUTPATIENT
Start: 2023-01-21 | End: 2023-01-26 | Stop reason: HOSPADM

## 2023-01-21 RX ORDER — BISACODYL 10 MG
10 SUPPOSITORY, RECTAL RECTAL DAILY PRN
Status: DISCONTINUED | OUTPATIENT
Start: 2023-01-21 | End: 2023-01-26 | Stop reason: HOSPADM

## 2023-01-21 RX ORDER — PANTOPRAZOLE SODIUM 20 MG/1
40 TABLET, DELAYED RELEASE ORAL AT BEDTIME
Status: DISCONTINUED | OUTPATIENT
Start: 2023-01-21 | End: 2023-01-23

## 2023-01-21 RX ORDER — IBUPROFEN 200 MG
400 TABLET ORAL DAILY PRN
Status: ON HOLD | COMMUNITY
End: 2023-02-13

## 2023-01-21 RX ORDER — LACTULOSE 10 G/15ML
15 SOLUTION ORAL 3 TIMES DAILY
Status: DISCONTINUED | OUTPATIENT
Start: 2023-01-21 | End: 2023-01-23

## 2023-01-21 RX ORDER — CEFTRIAXONE 1 G/1
1 INJECTION, POWDER, FOR SOLUTION INTRAMUSCULAR; INTRAVENOUS ONCE
Status: COMPLETED | OUTPATIENT
Start: 2023-01-21 | End: 2023-01-21

## 2023-01-21 RX ORDER — CEFTRIAXONE 1 G/1
1 INJECTION, POWDER, FOR SOLUTION INTRAMUSCULAR; INTRAVENOUS EVERY 24 HOURS
Status: DISCONTINUED | OUTPATIENT
Start: 2023-01-22 | End: 2023-01-25

## 2023-01-21 RX ORDER — LACTULOSE 10 G/15ML
30 SOLUTION ORAL ONCE
Status: COMPLETED | OUTPATIENT
Start: 2023-01-21 | End: 2023-01-21

## 2023-01-21 RX ORDER — LIDOCAINE 40 MG/G
CREAM TOPICAL
Status: DISCONTINUED | OUTPATIENT
Start: 2023-01-21 | End: 2023-01-26 | Stop reason: HOSPADM

## 2023-01-21 RX ORDER — ONDANSETRON 2 MG/ML
4 INJECTION INTRAMUSCULAR; INTRAVENOUS EVERY 6 HOURS PRN
Status: DISCONTINUED | OUTPATIENT
Start: 2023-01-21 | End: 2023-01-26 | Stop reason: HOSPADM

## 2023-01-21 RX ORDER — AMOXICILLIN 250 MG
2 CAPSULE ORAL 2 TIMES DAILY PRN
Status: DISCONTINUED | OUTPATIENT
Start: 2023-01-21 | End: 2023-01-26 | Stop reason: HOSPADM

## 2023-01-21 RX ORDER — ACETAMINOPHEN 325 MG/1
650 TABLET ORAL EVERY 6 HOURS PRN
Status: DISCONTINUED | OUTPATIENT
Start: 2023-01-21 | End: 2023-01-26 | Stop reason: HOSPADM

## 2023-01-21 RX ORDER — AMOXICILLIN 250 MG
1 CAPSULE ORAL 2 TIMES DAILY PRN
Status: DISCONTINUED | OUTPATIENT
Start: 2023-01-21 | End: 2023-01-26 | Stop reason: HOSPADM

## 2023-01-21 RX ADMIN — SPIRONOLACTONE 100 MG: 25 TABLET, FILM COATED ORAL at 20:09

## 2023-01-21 RX ADMIN — CEFTRIAXONE 1 G: 1 INJECTION, POWDER, FOR SOLUTION INTRAMUSCULAR; INTRAVENOUS at 08:44

## 2023-01-21 RX ADMIN — LACTULOSE 30 G: 10 SOLUTION ORAL at 08:49

## 2023-01-21 RX ADMIN — RIFAXIMIN 550 MG: 550 TABLET ORAL at 20:10

## 2023-01-21 RX ADMIN — INSULIN GLARGINE 16 UNITS: 100 INJECTION, SOLUTION SUBCUTANEOUS at 20:08

## 2023-01-21 RX ADMIN — THERA TABS 1 TABLET: TAB at 17:57

## 2023-01-21 RX ADMIN — FOLIC ACID 1 MG: 1 TABLET ORAL at 17:58

## 2023-01-21 RX ADMIN — PANTOPRAZOLE SODIUM 40 MG: 20 TABLET, DELAYED RELEASE ORAL at 20:10

## 2023-01-21 RX ADMIN — BUMETANIDE 1 MG: 1 TABLET ORAL at 17:58

## 2023-01-21 RX ADMIN — RIFAXIMIN 550 MG: 550 TABLET ORAL at 08:52

## 2023-01-21 RX ADMIN — PANTOPRAZOLE SODIUM 80 MG: 40 INJECTION, POWDER, FOR SOLUTION INTRAVENOUS at 08:18

## 2023-01-21 RX ADMIN — Medication 100 MG: at 17:58

## 2023-01-21 RX ADMIN — FLUOXETINE 60 MG: 20 CAPSULE ORAL at 20:08

## 2023-01-21 ASSESSMENT — ENCOUNTER SYMPTOMS
ABDOMINAL PAIN: 0
BLOOD IN STOOL: 0
ABDOMINAL DISTENTION: 0
CHILLS: 1
COUGH: 0
APPETITE CHANGE: 1
SLEEP DISTURBANCE: 1
ACTIVITY CHANGE: 1
DECREASED CONCENTRATION: 1
SHORTNESS OF BREATH: 0
FEVER: 0
CONFUSION: 1

## 2023-01-21 ASSESSMENT — ACTIVITIES OF DAILY LIVING (ADL)
ADLS_ACUITY_SCORE: 35
DIFFICULTY_EATING/SWALLOWING: NO
DRESSING/BATHING_DIFFICULTY: NO
TOILETING_ISSUES: NO
WEAR_GLASSES_OR_BLIND: YES
ADLS_ACUITY_SCORE: 37
VISION_MANAGEMENT: GLASSES
WALKING_OR_CLIMBING_STAIRS_DIFFICULTY: NO
CONCENTRATING,_REMEMBERING_OR_MAKING_DECISIONS_DIFFICULTY: YES
DOING_ERRANDS_INDEPENDENTLY_DIFFICULTY: YES
ADLS_ACUITY_SCORE: 32
CHANGE_IN_FUNCTIONAL_STATUS_SINCE_ONSET_OF_CURRENT_ILLNESS/INJURY: NO
HEARING_DIFFICULTY_OR_DEAF: NO
FALL_HISTORY_WITHIN_LAST_SIX_MONTHS: NO
ADLS_ACUITY_SCORE: 35
DIFFICULTY_COMMUNICATING: YES
ADLS_ACUITY_SCORE: 43

## 2023-01-21 NOTE — ED TRIAGE NOTES
Patient has been reportedly lethargic for 35-48hours. He has history of liver cirrhosis from alcohol abuse, DM2, autism.  for EMS. Last reported alcohol use April 2022. Poor eating, able to ambulate. Jaundice appearance and distended abdomen noted.      Triage Assessment     Row Name 01/21/23 0706       Triage Assessment (Adult)    Airway WDL WDL       Respiratory WDL    Respiratory WDL WDL       Skin Circulation/Temperature WDL    Skin Circulation/Temperature WDL X  jaundice       Cardiac WDL    Cardiac WDL X  HTN       Peripheral/Neurovascular WDL    Peripheral Neurovascular WDL WDL       Cognitive/Neuro/Behavioral WDL    Cognitive/Neuro/Behavioral WDL X    Level of Consciousness confused    Arousal Level opens eyes spontaneously    Orientation other (see comments)  will not answer orientation questions

## 2023-01-21 NOTE — ADDENDUM NOTE
Encounter addended by: Noa Bermudez LADC on: 1/21/2023 11:43 AM   Actions taken: Clinical Note Signed

## 2023-01-21 NOTE — ED PROVIDER NOTES
EMERGENCY DEPARTMENT ENCOUNTER      NAME: Dillon Salter  AGE: 27 year old male  YOB: 1995  MRN: [id number]  EVALUATION DATE & TIME: 2023  7:02 AM    PCP: Gary Rodrigues    ED PROVIDER: Memo Lizama MD        Chief Complaint   Patient presents with     Altered Mental Status         FINAL IMPRESSION:  1. Hepatic encephalopathy    2. Thrombocytopenia (H)    3. Anemia, unspecified type          ED COURSE & MEDICAL DECISION MAKIN I met with the patient to gather history and perform an initial exam. PPE: gloves, N95 mask.  8:37 AM Per RN, mother strongly prefers to keep patient admitted at [facility].  9:15 AM NP student reevaluated patient, who is feeling improved. He completed drinking his lactulose medication.  10:13 AM Discussed patient with hospitalist. Dr. Gonzalez.    Pertinent Labs & Imaging studies reviewed. (See chart for details)  27 year old male presents to the Emergency Department for evaluation of altered mental status.    ED Course as of 23 1024   Sat 2023   0717 27-year-old male with history of cirrhosis, end-stage liver disease, diabetes, autism being evaluated by transplant team at [facility] who presents with altered mental status.   0718 Reviewed triage nurse note from today and record and it is noted patient has been very somnolent the last 48 hours.  Per chart review of hospitalization from August he was hospitalized for hepatic encephalopathy secondary to missed lactulose.  Per chart review he has had a history of esophageal varices and MSSA bacteremia   0719 Differential for altered mental status includes hypoglycemia, sepsis, intracranial hemorrhage, hepatic encephalopathy, alcohol use, drug use, stroke, meningitis, seizures, uremia, psychosis, overdose, oxygen deficiency   0747 On exam patient is GCS 14.  Protecting airway.  Not actively vomiting.  There is no abdominal tenderness.  Bilateral breath sounds which are clear.  He is awake  and following commands.  Is drowsy and confused and has asterixis   0748 Has not taken his lactulose in about a month because he was having loose stools ever now has not had a bowel movement since Thursday 0748 No fever.  No falls.  Blood sugar for EMS was normal   0748 Was remote recent history of throwing up some blood   0803 With reports of recently vomiting of blood to give ceftriaxone based on his history of esophageal varices   0803 No active vomiting therefore we will hold off on octreotide infusions   0803 Given IV Protonix   0803 Given lactulose and rifaximin   0803 Work-up includes magnesium, BMP, ammonia, blood cultures, type and screen, INR, CBC, alcohol level, hepatic function   0804 Obtain COVID-19 for need for hospitalization   0804 For CT head based on confusion   0804 Based on history and examination bacterial meningitis, encephalitis highly unlikely therefore lumbar puncture not performed since would likely be more harmful than beneficial   0804 No abdominal tenderness or guarding or rigidity intra-abdominal source of infection unlikely.  CT imaging not ordered of abdomen   0811 INR(!): 1.54  Consistent with known cirrhosis   0825 Hemoglobin(!): 8.7  No significant change from most recent hemoglobin   0826 Platelet Count(!): 129  Chronic likely secondary to cirrhosis   0826 WBC: 8.8  Sepsis unlikely   0826 Sodium(!): 132  Unlikely the cause of his confusion   0835 Glucose(!): 179   0835 Calcium: 9.8   0835 Carbon Dioxide: 27   0835 Anion Gap: 8   0836 Meld score 24   0836 Ammonia(!): 77   0836 Alcohol, Blood: <10   0837 Ammonia(!): 77  Lactulose and rifaximin have been ordered   0837 Bilirubin Total(!): 11.9   0837 Bilirubin Direct(!): 2.5   0837 Alkaline Phosphatase(!): 132   0837 AST(!): 74   0837 ALT: 39  Increased bilirubin from last check.   0838 Nursing has updated me that mother is refusing transfer, which was being explored secondary to no current hospital beds and currently boarding  patients in ER   1009 Head CT is negative     Symptoms likely secondary to not taking lactulose and hepatic encephalopathy.  Will give ceftriaxone and IV Protonix in case patient does have a mild variceal bleed since he did have reports of some hematemesis earlier in the week    History is provided by patient's mother at bedside as well as review of his previous hospitalizations and discharge summary, colonoscopies, GI consult notes, and transplant team notes    Patient is GCS 14.  Protecting airway.  Was able to take in oral lactulose.  Recommend admission to the hospital since patient is encephalopathic and will need close monitoring.     Medical Decision Making    History:    Supplemental history from: Documented in chart, if applicable and Family Member/Significant Other    External Record(s) reviewed: Documented in chart, if applicable.    Work Up:    Chart documentation includes differential considered and any EKGs or imaging independently interpreted by provider, where specified.    In additional to work up documented, I considered the following work up: Documented in chart, if applicable.    External consultation:    Discussion of management with another provider: Documented in chart, if applicable    Complicating factors:    Care impacted by chronic illness: Other: End-stage liver disease    Care affected by social determinants of health: N/A    Disposition considerations: Admit.            At the conclusion of the encounter I discussed the results of all of the tests and the disposition. The questions were answered. The patient or family acknowledged understanding and was agreeable with the care plan.           MEDICATIONS GIVEN IN THE EMERGENCY:  Medications   lactulose (CHRONULAC) solution 30 g (30 g Oral Given 1/21/23 0849)   rifaximin (XIFAXAN) tablet 550 mg (550 mg Oral Given 1/21/23 0852)   pantoprazole (PROTONIX) IV push injection 80 mg (80 mg Intravenous Given 1/21/23 0818)   cefTRIAXone (ROCEPHIN)  "1 g vial to attach to  mL bag for ADULTS or NS 50 mL bag for PEDS (1 g Intravenous New Bag 1/21/23 0844)       NEW PRESCRIPTIONS STARTED AT TODAY'S ER VISIT  New Prescriptions    No medications on file          =================================================================    HPI    Triage note  \"  Patient has been reportedly lethargic for 35-48hours. He has history of liver cirrhosis from alcohol abuse, DM2, autism.  for EMS. Last reported alcohol use April 2022. Poor eating, able to ambulate. Jaundice appearance and distended abdomen noted.      Triage Assessment     Row Name 01/21/23 0706       Triage Assessment (Adult)    Airway WDL WDL       Respiratory WDL    Respiratory WDL WDL       Skin Circulation/Temperature WDL    Skin Circulation/Temperature WDL X  jaundice       Cardiac WDL    Cardiac WDL X  HTN       Peripheral/Neurovascular WDL    Peripheral Neurovascular WDL WDL       Cognitive/Neuro/Behavioral WDL    Cognitive/Neuro/Behavioral WDL X    Level of Consciousness confused    Arousal Level opens eyes spontaneously    Orientation other (see comments)  will not answer orientation questions              \"      Patient information was obtained from: mother and EMS    Use of : N/A         Dillon Salter is a 27 year old male with a pertinent history of autism spectrum disorder, end stage liver disease, EtOH cirrhosis complicated by ascites/fluid overload, hepatic encephalopathy, and variceal bleeding, chronic anemia, hypertension, MSSA bacteremia, and diabetes mellitus II who presents to this ED via EMS for evaluation of altered mental status.     Per chart review, Patient seen by VIPUL Jones most recently on 12/20/2022 for liver transplant consultation. He reported intermittent issues with epistaxis and gum bleeding at that time, but no other significant bleeding since September 2022. Mother reported they decreased his lactulose to as needed given that he has been having 3-4 bowel " movements per day. His prior decompensated alcohol-related cirrhosis and ascites was well managed with low-dose diuretics and his hepatic encephalopathy is well managed with maximal medical therapy.  His fluid and ascites has been managed with 1 mg of Bumex daily and spironolactone 100 mg daily. He has never undergone a paracentesis. He has not had had any issues with his variceal bleeding since May 2022 and is up-to-date on his variceal surveillance. His MELD-Na is 20. His MELD-Na is partially driven by an element of spur cell anemia. Reportedly sober since April 2022, and he has been in treatment for EtOH use in the past. GI gave mental health referral for substance use assessment and programming, referral to endocrinology given poor diabetes control based on reported AM blood sugars, and recommended PT, OT, and nutrition optimization. Follow-up CT angiogram and iron studies + ferritin also ordered.     Per mother's report, he takes many naps at baselines since his liver problems over the last year. He is often tired and has weak muscles. He has baseline jaundice and scleral icterus. Mother reports his jaundice is not acutely changed today. He uses a scooter when he goes to the grocery store.    This week however, he had been going through different meetings regarding liver transplant from Monday to Thursday and had been getting very tired during these meetings. He has been napping frequently especially since Thursday 1/19. His appetite has also been decreased. Over the last 35-48 hrs he has had increased lethargy. After his last meeting on Thursday, he slept for 24 hrs straight and was still very lethargic yesterday after waking, kept closing his eyes. At 11pm last night, his confusion worsened and his parents decided he needed to be seen in ED this morning.    He has not had any recent falls or trauma. He feels cold at baseline, but no fevers or acute chills. No shortness of breath or cough. He has not had any  pain or increased abdominal distension.     Mother notes, patient has previously appeared lethargic like this with anemia issues during which his Hgb had dropped to 5.0. He had been receiving weekly transfusions, but recently stopped receiving. His last transfusion was on 11/5/22, and his last Hgb check was on 12/22 and it was 8. He has not had any melena.  Over this week, he has also had gum bleeding and vomiting, with one episode of vomit that contained dark blood. No other complaints reported at this time.      REVIEW OF SYSTEMS   Review of Systems   Constitutional: Positive for activity change, appetite change and chills (baseline). Negative for fever.   Respiratory: Negative for cough and shortness of breath.    Gastrointestinal: Negative for abdominal distention, abdominal pain and blood in stool.   Skin: Negative for rash.   Neurological:        Positive for somnolence   Psychiatric/Behavioral: Positive for confusion, decreased concentration and sleep disturbance.      ROS obtained from mother, otherwise limited due to AMS    PAST MEDICAL HISTORY:  Past Medical History:   Diagnosis Date     Acute on chronic anemia 04/08/2022     Alcohol use disorder      Alcoholic cirrhosis of liver with ascites (H)      Alcoholic hepatitis      Autism spectrum      Autism spectrum disorder 4/8/2022    High functioning autistic.  26-year-old history of autism/Asperger's on the spectrum graduated high school at [employer] worked at [employer] and then another  place until the Covid epidemic in 2020 lives with parents (Leticia and Wes) socially isolated drinks alcohol beer daily      Benign essential hypertension      Bleeding esophageal varices (H) 6/18/2022     Hepatic encephalopathy      Hyponatremia 7/28/2022     Major depressive disorder      Type II Diabetes        PAST SURGICAL HISTORY:  Past Surgical History:   Procedure Laterality Date     ESOPHAGOSCOPY, GASTROSCOPY, DUODENOSCOPY (EGD), COMBINED N/A 5/24/2022     Procedure: ESOPHAGOGASTRODUODENOSCOPY (EGD) with esophageal banding;  Surgeon: Gary Hurst MD;  Location: [facility] Main OR     ESOPHAGOSCOPY, GASTROSCOPY, DUODENOSCOPY (EGD), COMBINED N/A 6/20/2022    Procedure: ESOPHAGOGASTRODUODENOSCOPY;  Surgeon: Gavino Reza MD;  Location: [facility] Main OR     MIDLINE DOUBLE LUMEN PLACEMENT  5/26/2022          PICC TRIPLE LUMEN PLACEMENT  7/28/2022                CURRENT MEDICATIONS:    acetaminophen (TYLENOL) 500 MG tablet  bumetanide (BUMEX) 1 MG tablet  FLUoxetine (PROZAC) 20 MG capsule  FLUoxetine (PROZAC) 40 MG capsule  folic acid (FOLVITE) 1 MG tablet  gabapentin (NEURONTIN) 100 MG capsule  ibuprofen (ADVIL/MOTRIN) 200 MG tablet  insulin aspart (NOVOLOG FLEXPEN) 100 UNIT/ML pen  insulin glargine (LANTUS PEN) 100 UNIT/ML pen  lactulose (CHRONULAC) 10 GM/15ML solution  multivitamin, therapeutic (THERA-VIT) TABS tablet  pantoprazole (PROTONIX) 40 MG EC tablet  rifaximin (XIFAXAN) 550 MG TABS tablet  spironolactone (ALDACTONE) 100 MG tablet  thiamine (B-1) 100 MG tablet        ALLERGIES:  No Known Allergies    FAMILY HISTORY:  Family History   Problem Relation Age of Onset     Hypertension Mother      Substance Abuse Mother      Thyroid Disease Mother      Heart Failure Father      Substance Abuse Father      Chronic Obstructive Pulmonary Disease Father      Substance Abuse Maternal Grandfather        SOCIAL HISTORY:   Social History     Socioeconomic History     Marital status: Single     Spouse name: None     Number of children: None     Years of education: None     Highest education level: None   Tobacco Use     Smoking status: Former     Smokeless tobacco: Never     Tobacco comments:     A pack lasted a year, hardly smoked   Vaping Use     Vaping Use: Former   Substance and Sexual Activity     Alcohol use: Not Currently     Comment: No ETOH since 4/6/22     Drug use: Not Currently     Types: Marijuana   Social History Narrative    26-year-old  history of autism/Asperger's on the spectrum graduated high school at [employer] worked at [employer] and then another  place until the Covid epidemic in 2020 lives with parents (Leticia and Wes) socially isolated drinks alcohol beer daily        End-stage cirrhosis noted on admission to  April 2022       VITALS:  BP (!) 156/73   Pulse 82   Temp 98.3  F (36.8  C) (Oral)   Resp 20   Ht 1.524 m (5')   Wt 68.9 kg (152 lb)   SpO2 100%   BMI 29.69 kg/m      PHYSICAL EXAM      Vitals: BP (!) 156/73   Pulse 82   Temp 98.3  F (36.8  C) (Oral)   Resp 20   Ht 1.524 m (5')   Wt 68.9 kg (152 lb)   SpO2 100%   BMI 29.69 kg/m    General: Appears in no acute distress, drowsy, interactive.  Jaundiced  Eyes: Conjunctivae non-injected. Scleral icterus.  HENT: Atraumatic.  Neck: Supple.  Respiratory/Chest: Respiration unlabored.  No wheezing or crackles  Heart: RRR. Normal S1 and S2.  Abdomen: non distended, no abdominal tenderness or guarding or rigidity  Musculoskeletal: Normal extremities. No edema or erythema.  Skin: Jaundice. No rash or diaphoresis.  Neurologic: Face symmetric, moves all extremities spontaneously. Speech clear.  Psychiatric: Lethargic.       LAB:  All pertinent labs reviewed and interpreted.  Results for orders placed or performed during the hospital encounter of 01/21/23   Head CT w/o contrast    Impression    IMPRESSION:  1.  No finding for intracranial hemorrhage, mass, or acute infarct.       Ammonia (on ice)   Result Value Ref Range    Ammonia 77 (H) 11 - 35 umol/L   Hepatic function panel   Result Value Ref Range    Bilirubin Total 11.9 (H) 0.0 - 1.0 mg/dL    Bilirubin Direct 2.5 (H) <=0.5 mg/dL    Protein Total 7.8 6.0 - 8.0 g/dL    Albumin 3.3 (L) 3.5 - 5.0 g/dL    Alkaline Phosphatase 132 (H) 45 - 120 U/L    AST 74 (H) 0 - 40 U/L    ALT 39 0 - 45 U/L   Basic metabolic panel   Result Value Ref Range    Sodium 132 (L) 136 - 145 mmol/L    Potassium 5.0 3.5 - 5.0 mmol/L    Chloride 97 (L) 98 -  107 mmol/L    Carbon Dioxide (CO2) 27 22 - 31 mmol/L    Anion Gap 8 5 - 18 mmol/L    Urea Nitrogen 19 8 - 22 mg/dL    Creatinine 0.77 0.70 - 1.30 mg/dL    Calcium 9.8 8.5 - 10.5 mg/dL    Glucose 179 (H) 70 - 125 mg/dL    GFR Estimate >90 >60 mL/min/1.73m2   Alcohol level blood   Result Value Ref Range    Alcohol, Blood <10 None detected mg/dL   Result Value Ref Range    INR 1.54 (H) 0.85 - 1.15   Result Value Ref Range    Magnesium 1.8 1.8 - 2.6 mg/dL   Asymptomatic COVID-19 Virus (Coronavirus) by PCR Nasopharyngeal    Specimen: Nasopharyngeal; Swab   Result Value Ref Range    SARS CoV2 PCR Negative Negative   CBC with platelets and differential   Result Value Ref Range    WBC Count 8.8 4.0 - 11.0 10e3/uL    RBC Count 2.35 (L) 4.40 - 5.90 10e6/uL    Hemoglobin 8.7 (L) 13.3 - 17.7 g/dL    Hematocrit 24.7 (L) 40.0 - 53.0 %     (H) 78 - 100 fL    MCH 37.0 (H) 26.5 - 33.0 pg    MCHC 35.2 31.5 - 36.5 g/dL    RDW 13.4 10.0 - 15.0 %    Platelet Count 129 (L) 150 - 450 10e3/uL    % Neutrophils 73 %    % Lymphocytes 18 %    % Monocytes 4 %    % Eosinophils 2 %    % Basophils 1 %    % Immature Granulocytes 2 %    NRBCs per 100 WBC 0 <1 /100    Absolute Neutrophils 6.4 1.6 - 8.3 10e3/uL    Absolute Lymphocytes 1.6 0.8 - 5.3 10e3/uL    Absolute Monocytes 0.4 0.0 - 1.3 10e3/uL    Absolute Eosinophils 0.1 0.0 - 0.7 10e3/uL    Absolute Basophils 0.1 0.0 - 0.2 10e3/uL    Absolute Immature Granulocytes 0.2 <=0.4 10e3/uL    Absolute NRBCs 0.0 10e3/uL   Adult Type and Screen   Result Value Ref Range    ABO/RH(D) A NEG     Antibody Screen Negative Negative    SPECIMEN EXPIRATION DATE [id number]        RADIOLOGY:  Reviewed all pertinent imaging. Please see official radiology report.  Head CT w/o contrast   Final Result   IMPRESSION:   1.  No finding for intracranial hemorrhage, mass, or acute infarct.                    Terry WOOD, am serving as a scribe to document services personally performed by Ciaran Lizama MD  based on my observation and the provider's statements to me. I, Dr. Ciaran Lizama, attest that Terry Jackson is acting in a scribe capacity, has observed my performance of the services and has documented them in accordance with my direction.    Ciaran Lizama MD  Emergency Medicine  [facility] EMERGENCY ROOM  [address] [address]  [phone]     Ciaran Lizama MD  01/21/23 1024

## 2023-01-21 NOTE — PHARMACY-ADMISSION MEDICATION HISTORY
Pharmacy Note - Admission Medication History    Pertinent Provider Information:      ______________________________________________________________________    Prior To Admission (PTA) med list completed and updated in EMR.       PTA Med List   Medication Sig Note Last Dose     acetaminophen (TYLENOL) 500 MG tablet Take 500 mg by mouth daily as needed for pain  1/16/2023 at PRN     bumetanide (BUMEX) 1 MG tablet Take 1 tablet (1 mg) by mouth daily  1/18/2023     FLUoxetine (PROZAC) 20 MG capsule Take 60 mg by mouth At Bedtime  1/18/2023     folic acid (FOLVITE) 1 MG tablet Take 1 tablet (1 mg) by mouth daily  1/18/2023     gabapentin (NEURONTIN) 100 MG capsule Take 100 mg by mouth daily as needed  More than a month at PRN     ibuprofen (ADVIL/MOTRIN) 200 MG tablet Take 400 mg by mouth daily as needed for pain  1/16/2023 at PRN     insulin aspart (NOVOLOG FLEXPEN) 100 UNIT/ML pen 1 unit per 10 grams of carb, plus additional units based on following scale   For Pre-Meal  - 164 give 1 unit. For Pre-Meal  - 189 give 2 units. For Pre-Meal  - 214 give 3 units. For Pre-Meal  - 239 give 4 units. For Pre-Meal  - 264 give 5 units. For Pre-Meal  - 289 give 6 units. For Pre-Meal  - 314 give 7 units. For Pre-Meal  - 339 give 8 units. For Pre-Meal  - 364 give 9 units.  For Pre-Meal BG greater than or equal to 365 give 10 units (Patient taking differently: 1-10 Units 1 unit per 10 grams of carb, plus additional units based on following scale   For Pre-Meal  - 164 give 1 unit. For Pre-Meal  - 189 give 2 units. For Pre-Meal  - 214 give 3 units. For Pre-Meal  - 239 give 4 units. For Pre-Meal  - 264 give 5 units. For Pre-Meal  - 289 give 6 units. For Pre-Meal  - 314 give 7 units. For Pre-Meal  - 339 give 8 units. For Pre-Meal  - 364 give 9 units.  For Pre-Meal BG greater than or equal to 365 give 10 units) 1/21/2023: Patient  typically uses 6-7 units when needed  1/7/2023 at PRN     insulin glargine (LANTUS PEN) 100 UNIT/ML pen Inject 20 Units Subcutaneous 2 times daily  1/19/2023     lactulose (CHRONULAC) 10 GM/15ML solution Take 15-30 mLs (10-20 g) by mouth 2 times daily (Patient taking differently: Take 10-20 g by mouth 2 times daily as needed)  More than a month at PRN     multivitamin, therapeutic (THERA-VIT) TABS tablet Take 1 tablet by mouth in the morning.  More than a month     pantoprazole (PROTONIX) 40 MG EC tablet Take 1 tablet (40 mg) by mouth daily  1/18/2023 at hs     rifaximin (XIFAXAN) 550 MG TABS tablet Take 1 tablet (550 mg) by mouth 2 times daily  1/18/2023     spironolactone (ALDACTONE) 100 MG tablet Take 100 mg by mouth At Bedtime  1/18/2023 at hs     thiamine (B-1) 100 MG tablet Take 1 tablet (100 mg) by mouth in the morning.  1/18/2023 at am       Information source(s): Family member and CareEverywhere/SureScripts  Method of interview communication: in-person    Summary of Changes to PTA Med List  New: Tylenol & ibuprofen   Discontinued: oxycodone, fluconazole, calcium carb + d, furosemide, magnesium, hydroxyzine, vitamin D2, & vitamin D3  Changed: None     Patient was asked about OTC/herbal products specifically.  PTA med list reflects this.    In the past week, patient estimated taking medication this percent of the time:  greater than 90%.    Medication Affordability: Medications are affordable        Allergies were reviewed, assessed, and updated with the patient.      Patient does not use any multi-dose medications prior to admission.    The information provided in this note is only as accurate as the sources available at the time of the update(s).    Thank you for the opportunity to participate in the care of this patient.    Ryan Garcia  1/21/2023 1:38 PM

## 2023-01-21 NOTE — H&P
[facility]    History and Physical - Hospitalist Service       Date of Admission:  1/21/2023    Assessment & Plan      Dillon Salter is a 27 year old male admitted on 1/21/2023. He has a history of alcoholic cirrhosis diagnosed April 2022, esophageal varices s/p banding, hepatic encephalopathy, thrombocytopenia, Autism spectrum disorder, anxiety/depression, HTN, and multifactorial chronic anemia presents with acute metabolic encephalopathy likely from non-adherence to home medications including lactulose. Lactulose was stopped completely several weeks ago and all medications were not administered at home for 48-hours. Start lactulose and titrate to clinical improvement, IV ceftriaxone given variceal bleeding history and decompensated cirrhosis, and diagnostic paracentesis if able to draw fluid.     Metabolic Encephalopathy - likely Hepatic Encephalopathy  Non-Adherence / Medication Non-Compliance  Alcoholic Cirrhosis - sober since 4/2022, on hepatology care team at [facility] with Dr. Wood  -Current presentation most likely from medication non-adherence. Lactulose was stopped several weeks ago (estimated 4-6 weeks) and all home prescribed medications not given over the past 2-days.   -His mother is his caretaker and education provided on reason/rationale for his prescribed medications, of particularly import the lactulose and rifaximin for HE.   -Order lactulose 15 g TID with hold parameters - may titrate (up or down if loose stools) to goal of at least 3-4 moderate/large BMs.   -Order rifaximin.   -Follow-up set of blood cultures  -Diagnostic paracentesis with labs and cell count    MDD/Anxiety  -Order home medications.     HTN  -Order home medications and as needed apply specified hold parameters.     ASD  -Order home medications  -Mother is his caretaker  -Education provided to both patient and his mother  -CM/SW       Diet:    Regular   DVT Prophylaxis: Pneumatic Compression Devices,  Anti-embolisim stockings (TEDs) and Ambulate every shift  Negron Catheter: Not present  Lines: None     Cardiac Monitoring: None  Code Status:   FULL CODE    Clinically Significant Risk Factors Present on Admission           # Hypercalcemia: corrected calcium is >10.1, will monitor as appropriate    # Hypoalbuminemia: Lowest albumin = 3.3 g/dL at 1/21/2023  7:54 AM, will monitor as appropriate  # Coagulation Defect: INR = 1.54 (Ref range: 0.85 - 1.15) and/or PTT = N/A, will monitor for bleeding  # Thrombocytopenia: Lowest platelets = 129 in last 2 days, will monitor for bleeding        # Overweight: Estimated body mass index is 29.69 kg/m  as calculated from the following:    Height as of this encounter: 1.524 m (5').    Weight as of this encounter: 68.9 kg (152 lb).           Disposition Plan      Expected Discharge Date: 01/23/2023                  Saeid Gonzalez MD  Hospitalist Service  [facility]  Securely message with [employer] (more info)  Text page via [email or web address] Paging/Directory     ______________________________________________________________________    Chief Complaint   Confusion    History is obtained from the patient and patient's parent    History of Present Illness   Dillon Salter is a 27 year old male admitted on 1/21/2023. He has a history of alcoholic cirrhosis diagnosed April 2022, esophageal varices s/p banding, hepatic encephalopathy, thrombocytopenia, Autism spectrum disorder, anxiety/depression, HTN, and multifactorial chronic anemia presents with acute metabolic encephalopathy likely from non-adherence to home medications including lactulose.     Lactulose was stopped completely several weeks ago and all medications were not administered at home for 48-hours. Altered mental status progressed; no fevers measured; somnolence and lethargy. No shortness of breath or cough. No diarrhea. No other associated symptoms endorsed. Otherwise, there is no endorsement of any fevers,  rigors, chest pain or shortness of breath, nausea or vomiting or abdominal pain, urinary symptoms, focal weakness, or any other new and associated symptoms at this time. 14 point review of systems performed without any other pertinent positives at this time.  His mother is at bedside throughout.     The social history, family history, and past medical history were directly reviewed. All questions the patient had at this time were answered to verbalized and stated satisfaction and understanding. Code status was discussed and the patient confirmed wishes for full code for this hospitalization.    Past Medical History    Past Medical History:   Diagnosis Date     Acute on chronic anemia 04/08/2022     Alcohol use disorder      Alcoholic cirrhosis of liver with ascites (H)      Alcoholic hepatitis      Autism spectrum      Autism spectrum disorder 4/8/2022    High functioning autistic.  26-year-old history of autism/Asperger's on the spectrum graduated high school at [employer] worked at [employer] and then another  place until the Covid epidemic in 2020 lives with parents (Leticia and Wes) socially isolated drinks alcohol beer daily      Benign essential hypertension      Bleeding esophageal varices (H) 6/18/2022     Hepatic encephalopathy      Hyponatremia 7/28/2022     Major depressive disorder      Type II Diabetes        Past Surgical History   Past Surgical History:   Procedure Laterality Date     ESOPHAGOSCOPY, GASTROSCOPY, DUODENOSCOPY (EGD), COMBINED N/A 5/24/2022    Procedure: ESOPHAGOGASTRODUODENOSCOPY (EGD) with esophageal banding;  Surgeon: Gary Hurst MD;  Location: [facility] OR     ESOPHAGOSCOPY, GASTROSCOPY, DUODENOSCOPY (EGD), COMBINED N/A 6/20/2022    Procedure: ESOPHAGOGASTRODUODENOSCOPY;  Surgeon: Gavino Reza MD;  Location: [facility] OR     MIDLINE DOUBLE LUMEN PLACEMENT  5/26/2022          PICC TRIPLE LUMEN PLACEMENT  7/28/2022            Prior to Admission  Medications   Prior to Admission Medications   Prescriptions Last Dose Informant Patient Reported? Taking?   FLUoxetine (PROZAC) 20 MG capsule 2023  Yes Yes   Sig: Take 60 mg by mouth At Bedtime   acetaminophen (TYLENOL) 500 MG tablet 2023 at PRN  Yes Yes   Sig: Take 500 mg by mouth daily as needed for pain   bumetanide (BUMEX) 1 MG tablet 2023  No Yes   Sig: Take 1 tablet (1 mg) by mouth daily   folic acid (FOLVITE) 1 MG tablet 2023  No Yes   Sig: Take 1 tablet (1 mg) by mouth daily   gabapentin (NEURONTIN) 100 MG capsule More than a month at PRN  Yes Yes   Sig: Take 100 mg by mouth daily as needed   ibuprofen (ADVIL/MOTRIN) 200 MG tablet 2023 at PRN  Yes Yes   Sig: Take 400 mg by mouth daily as needed for pain   insulin aspart (NOVOLOG FLEXPEN) 100 UNIT/ML pen 2023 at PRN  No Yes   Si unit per 10 grams of carb, plus additional units based on following scale   For Pre-Meal  - 164 give 1 unit. For Pre-Meal  - 189 give 2 units. For Pre-Meal  - 214 give 3 units. For Pre-Meal  - 239 give 4 units. For Pre-Meal  - 264 give 5 units. For Pre-Meal  - 289 give 6 units. For Pre-Meal  - 314 give 7 units. For Pre-Meal  - 339 give 8 units. For Pre-Meal  - 364 give 9 units.  For Pre-Meal BG greater than or equal to 365 give 10 units   Patient taking differently: 1-10 Units 1 unit per 10 grams of carb, plus additional units based on following scale   For Pre-Meal  - 164 give 1 unit. For Pre-Meal  - 189 give 2 units. For Pre-Meal  - 214 give 3 units. For Pre-Meal  - 239 give 4 units. For Pre-Meal  - 264 give 5 units. For Pre-Meal  - 289 give 6 units. For Pre-Meal  - 314 give 7 units. For Pre-Meal  - 339 give 8 units. For Pre-Meal  - 364 give 9 units.  For Pre-Meal BG greater than or equal to 365 give 10 units   insulin glargine (LANTUS PEN) 100 UNIT/ML pen 2023  No Yes   Sig: Inject 20  Units Subcutaneous 2 times daily   lactulose (CHRONULAC) 10 GM/15ML solution More than a month at PRN  No Yes   Sig: Take 15-30 mLs (10-20 g) by mouth 2 times daily   Patient taking differently: Take 10-20 g by mouth 2 times daily as needed   multivitamin, therapeutic (THERA-VIT) TABS tablet More than a month  No Yes   Sig: Take 1 tablet by mouth in the morning.   pantoprazole (PROTONIX) 40 MG EC tablet 1/18/2023 at hs  No Yes   Sig: Take 1 tablet (40 mg) by mouth daily   rifaximin (XIFAXAN) 550 MG TABS tablet 1/18/2023  No Yes   Sig: Take 1 tablet (550 mg) by mouth 2 times daily   spironolactone (ALDACTONE) 100 MG tablet 1/18/2023 at hs  Yes Yes   Sig: Take 100 mg by mouth At Bedtime   thiamine (B-1) 100 MG tablet 1/18/2023 at am  No Yes   Sig: Take 1 tablet (100 mg) by mouth in the morning.      Facility-Administered Medications: None        Physical Exam   Vital Signs: Temp: 98.3  F (36.8  C) Temp src: Oral BP: 135/71 Pulse: 87   Resp: 16 SpO2: 100 % O2 Device: None (Room air)    Weight: 152 lbs 0 oz    GENERAL:  Sleepy but easily awoken, appears comfortable, in no acute distress, appears stated age   HEAD:  Normocephalic, without obvious abnormality, atraumatic   EYES:  PERRL, conjunctiva/corneas clear, no scleral icterus, EOM's intact   NOSE: Nares normal, septum midline, mucosa normal, no drainage   THROAT: Lips, mucosa, and tongue normal; teeth and gums normal, mouth moist   NECK: Supple, symmetrical, trachea midline   BACK:   Symmetric, no curvature, ROM normal   LUNGS:   Clear to auscultation bilaterally, no rales, rhonchi, or wheezing, symmetric chest rise on inhalation, respirations unlabored   CHEST WALL:  No tenderness or deformity   HEART:  Regular rate and rhythm, S1 and S2 normal, no murmur, rub, or gallop    ABDOMEN:   Soft, non-tender, bowel sounds active all four quadrants, no masses, no organomegaly, no rebound or guarding   EXTREMITIES: Extremities normal, atraumatic, no cyanosis or edema     SKIN: Jaundiced throughout; dry to touch, no other exanthems in the visualized areas   NEURO: Easily awoken and alert once awoken, oriented to self, moves all four extremities freely/spontaneously   PSYCH: Cooperative, behavior is appropriate        Medical Decision Making     82 MINUTES SPENT BY ME on the date of service doing chart review, history, exam, documentation & further activities per the note.      Data     I have personally reviewed the following data over the past 24 hrs:    8.8  \   8.7 (L)   / 129 (L)     132 (L) 97 (L) 19 /  179 (H)   5.0 27 0.77 \       ALT: 39 AST: 74 (H) AP: 132 (H) TBILI: 11.9 (H)   ALB: 3.3 (L) TOT PROTEIN: 7.8 LIPASE: N/A       INR:  1.54 (H) PTT:  N/A   D-dimer:  N/A Fibrinogen:  N/A       Imaging results reviewed over the past 24 hrs:   Recent Results (from the past 24 hour(s))   Head CT w/o contrast    Narrative    EXAM: CT HEAD WITHOUT CONTRAST  LOCATION: [facility]  DATE/TIME: 01/21/2023, 8:33 AM    INDICATION: Confusion.  COMPARISON: CT brain 08/18/2022.  TECHNIQUE: Routine CT Head without IV contrast. Multiplanar reformats. Dose reduction techniques were used.    FINDINGS:  INTRACRANIAL CONTENTS: Exam is degraded by motion artifact. No finding for intracranial hemorrhage, mass, or acute infarct. The ventricles are normal in size. Normal gray-white matter differentiation. No mass effect or midline shift.    Cerebellar tonsils are normally positioned. Sella is unremarkable for technique. Corpus callosum is normally formed.    VISUALIZED ORBITS/SINUSES/MASTOIDS: Orbits are unremarkable. Visualized paranasal sinuses, middle ear cavities, and mastoid air cells are clear.    BONES/SOFT TISSUES: Calvarium is intact, without fracture or suspicious lytic or blastic foci.      Impression    IMPRESSION:  1.  No finding for intracranial hemorrhage, mass, or acute infarct.

## 2023-01-22 LAB
ANION GAP SERPL CALCULATED.3IONS-SCNC: 13 MMOL/L (ref 5–18)
BASOPHILS # BLD AUTO: 0.2 10E3/UL (ref 0–0.2)
BASOPHILS NFR BLD AUTO: 1 %
BUN SERPL-MCNC: 28 MG/DL (ref 8–22)
CALCIUM SERPL-MCNC: 9.4 MG/DL (ref 8.5–10.5)
CHLORIDE BLD-SCNC: 95 MMOL/L (ref 98–107)
CO2 SERPL-SCNC: 24 MMOL/L (ref 22–31)
CREAT SERPL-MCNC: 0.97 MG/DL (ref 0.7–1.3)
EOSINOPHIL # BLD AUTO: 0.1 10E3/UL (ref 0–0.7)
EOSINOPHIL NFR BLD AUTO: 1 %
ERYTHROCYTE [DISTWIDTH] IN BLOOD BY AUTOMATED COUNT: 13.2 % (ref 10–15)
GFR SERPL CREATININE-BSD FRML MDRD: >90 ML/MIN/1.73M2
GLUCOSE BLD-MCNC: 175 MG/DL (ref 70–125)
GLUCOSE BLDC GLUCOMTR-MCNC: 161 MG/DL (ref 70–99)
GLUCOSE BLDC GLUCOMTR-MCNC: 167 MG/DL (ref 70–99)
GLUCOSE BLDC GLUCOMTR-MCNC: 211 MG/DL (ref 70–99)
GLUCOSE BLDC GLUCOMTR-MCNC: 282 MG/DL (ref 70–99)
HCT VFR BLD AUTO: 24.6 % (ref 40–53)
HGB BLD-MCNC: 8.7 G/DL (ref 13.3–17.7)
IMM GRANULOCYTES # BLD: 0.2 10E3/UL
IMM GRANULOCYTES NFR BLD: 2 %
LYMPHOCYTES # BLD AUTO: 2.2 10E3/UL (ref 0.8–5.3)
LYMPHOCYTES NFR BLD AUTO: 18 %
MCH RBC QN AUTO: 36.9 PG (ref 26.5–33)
MCHC RBC AUTO-ENTMCNC: 35.4 G/DL (ref 31.5–36.5)
MCV RBC AUTO: 104 FL (ref 78–100)
MONOCYTES # BLD AUTO: 0.6 10E3/UL (ref 0–1.3)
MONOCYTES NFR BLD AUTO: 5 %
NEUTROPHILS # BLD AUTO: 8.6 10E3/UL (ref 1.6–8.3)
NEUTROPHILS NFR BLD AUTO: 73 %
NRBC # BLD AUTO: 0 10E3/UL
NRBC BLD AUTO-RTO: 0 /100
PLATELET # BLD AUTO: 142 10E3/UL (ref 150–450)
POTASSIUM BLD-SCNC: 4.1 MMOL/L (ref 3.5–5)
RBC # BLD AUTO: 2.36 10E6/UL (ref 4.4–5.9)
SODIUM SERPL-SCNC: 132 MMOL/L (ref 136–145)
WBC # BLD AUTO: 11.8 10E3/UL (ref 4–11)

## 2023-01-22 PROCEDURE — 85025 COMPLETE CBC W/AUTO DIFF WBC: CPT | Performed by: HOSPITALIST

## 2023-01-22 PROCEDURE — 250N000013 HC RX MED GY IP 250 OP 250 PS 637: Performed by: HOSPITALIST

## 2023-01-22 PROCEDURE — 250N000012 HC RX MED GY IP 250 OP 636 PS 637: Performed by: HOSPITALIST

## 2023-01-22 PROCEDURE — 99233 SBSQ HOSP IP/OBS HIGH 50: CPT | Performed by: HOSPITALIST

## 2023-01-22 PROCEDURE — 36415 COLL VENOUS BLD VENIPUNCTURE: CPT | Performed by: HOSPITALIST

## 2023-01-22 PROCEDURE — 120N000001 HC R&B MED SURG/OB

## 2023-01-22 PROCEDURE — 80048 BASIC METABOLIC PNL TOTAL CA: CPT | Performed by: HOSPITALIST

## 2023-01-22 PROCEDURE — 250N000011 HC RX IP 250 OP 636: Performed by: HOSPITALIST

## 2023-01-22 RX ORDER — DEXTROSE MONOHYDRATE 25 G/50ML
25-50 INJECTION, SOLUTION INTRAVENOUS
Status: DISCONTINUED | OUTPATIENT
Start: 2023-01-22 | End: 2023-01-26 | Stop reason: HOSPADM

## 2023-01-22 RX ORDER — NICOTINE POLACRILEX 4 MG
15-30 LOZENGE BUCCAL
Status: DISCONTINUED | OUTPATIENT
Start: 2023-01-22 | End: 2023-01-26 | Stop reason: HOSPADM

## 2023-01-22 RX ADMIN — INSULIN GLARGINE 16 UNITS: 100 INJECTION, SOLUTION SUBCUTANEOUS at 19:48

## 2023-01-22 RX ADMIN — INSULIN ASPART 1 UNITS: 100 INJECTION, SOLUTION INTRAVENOUS; SUBCUTANEOUS at 12:41

## 2023-01-22 RX ADMIN — BUMETANIDE 1 MG: 1 TABLET ORAL at 08:22

## 2023-01-22 RX ADMIN — Medication 100 MG: at 08:22

## 2023-01-22 RX ADMIN — INSULIN ASPART 2 UNITS: 100 INJECTION, SOLUTION INTRAVENOUS; SUBCUTANEOUS at 18:01

## 2023-01-22 RX ADMIN — PANTOPRAZOLE SODIUM 40 MG: 20 TABLET, DELAYED RELEASE ORAL at 21:42

## 2023-01-22 RX ADMIN — LACTULOSE 15 G: 10 SOLUTION ORAL at 17:04

## 2023-01-22 RX ADMIN — INSULIN ASPART 6 UNITS: 100 INJECTION, SOLUTION INTRAVENOUS; SUBCUTANEOUS at 12:41

## 2023-01-22 RX ADMIN — ONDANSETRON 4 MG: 4 TABLET, ORALLY DISINTEGRATING ORAL at 19:46

## 2023-01-22 RX ADMIN — INSULIN GLARGINE 16 UNITS: 100 INJECTION, SOLUTION SUBCUTANEOUS at 08:24

## 2023-01-22 RX ADMIN — THERA TABS 1 TABLET: TAB at 08:22

## 2023-01-22 RX ADMIN — RIFAXIMIN 550 MG: 550 TABLET ORAL at 19:46

## 2023-01-22 RX ADMIN — FOLIC ACID 1 MG: 1 TABLET ORAL at 08:22

## 2023-01-22 RX ADMIN — CEFTRIAXONE 1 G: 1 INJECTION, POWDER, FOR SOLUTION INTRAMUSCULAR; INTRAVENOUS at 08:11

## 2023-01-22 RX ADMIN — LACTULOSE 15 G: 10 SOLUTION ORAL at 08:20

## 2023-01-22 RX ADMIN — INSULIN ASPART 2 UNITS: 100 INJECTION, SOLUTION INTRAVENOUS; SUBCUTANEOUS at 18:00

## 2023-01-22 RX ADMIN — INSULIN ASPART 6 UNITS: 100 INJECTION, SOLUTION INTRAVENOUS; SUBCUTANEOUS at 08:16

## 2023-01-22 RX ADMIN — FLUOXETINE 60 MG: 20 CAPSULE ORAL at 21:42

## 2023-01-22 RX ADMIN — INSULIN ASPART 6 UNITS: 100 INJECTION, SOLUTION INTRAVENOUS; SUBCUTANEOUS at 08:17

## 2023-01-22 RX ADMIN — RIFAXIMIN 550 MG: 550 TABLET ORAL at 08:22

## 2023-01-22 ASSESSMENT — ACTIVITIES OF DAILY LIVING (ADL)
ADLS_ACUITY_SCORE: 32
DEPENDENT_IADLS:: CLEANING;COOKING;LAUNDRY;SHOPPING;MEAL PREPARATION;MEDICATION MANAGEMENT;MONEY MANAGEMENT;TRANSPORTATION
ADLS_ACUITY_SCORE: 32

## 2023-01-22 NOTE — PROGRESS NOTES
[facility] Weekly Treatment Plan Review    ATTENDANCE for the following date span:  23--- 23    Date     Group Therapy 2.0 hours 3.0 hours    2.0 hours 2.0 hours         Individual Therapy  2.0 hour   1 hour           Family Therapy                 Psychoeducation                 Other (Specify)                    Client did not have any absences this week.     Total # of Phase 1 Group Sessions: 4 (4 total)                              Total # of Phase 2 Group Sessions: 0 (0 total)  Total # of Phase 3 Group Sessions: 0 (0 total)  Total # of 1:1 Sessions:  2  (2 total)    Projected discharge date:  23      Weekly Treatment Plan Review     Treatment Plan initiated on: 23    Dimension1: Acute Intoxication/Withdrawal Potential -   Previous Dimension Ratin  Current Dimension Ratin  Date of Last Use 22  Any reports of withdrawal symptoms - No    23-23: Client denies any use episodes. Client submitted to UA while at the hospital. Results were negative for alcohol.    Dimension 2: Biomedical Conditions & Complications -   Previous Dimension Ratin  Current Dimension Ratin  Medical Concerns:    Current Medications & Medication Changes:  No current facility-administered medications for this encounter.     No current outpatient medications on file.     Facility-Administered Medications Ordered in Other Encounters   Medication     acetaminophen (TYLENOL) tablet 650 mg    Or     acetaminophen (TYLENOL) Suppository 650 mg     acetaminophen (TYLENOL) tablet 500 mg     bisacodyl (DULCOLAX) suppository 10 mg     bumetanide (BUMEX) tablet 1 mg     cefTRIAXone (ROCEPHIN) 1 g vial to attach to  mL bag for ADULTS or NS 50 mL bag for PEDS     glucose gel 15-30 g    Or     dextrose 50 % injection 25-50 mL    Or     glucagon injection 1 mg     FLUoxetine (PROzac) capsule 60 mg     folic acid (FOLVITE) tablet 1 mg  "    gabapentin (NEURONTIN) capsule 100 mg     ibuprofen (ADVIL/MOTRIN) tablet 400 mg     insulin aspart (NovoLOG) injection (RAPID ACTING)     insulin aspart (NovoLOG) injection (RAPID ACTING)     insulin glargine (LANTUS PEN) injection 16 Units     lactulose (CHRONULAC) solution 15 g     lidocaine (LMX4) cream     lidocaine 1 % 0.1-1 mL     melatonin tablet 1 mg     multivitamin, therapeutic (THERA-VIT) tablet 1 tablet     ondansetron (ZOFRAN ODT) ODT tab 4 mg    Or     ondansetron (ZOFRAN) injection 4 mg     pantoprazole (PROTONIX) EC tablet 40 mg     polyethylene glycol (MIRALAX) Packet 17 g     rifaximin (XIFAXAN) tablet 550 mg     senna-docusate (SENOKOT-S/PERICOLACE) 8.6-50 MG per tablet 1 tablet    Or     senna-docusate (SENOKOT-S/PERICOLACE) 8.6-50 MG per tablet 2 tablet     sodium chloride (PF) 0.9% PF flush 3 mL     sodium chloride (PF) 0.9% PF flush 3 mL     [Held by provider] spironolactone (ALDACTONE) tablet 100 mg     thiamine (B-1) tablet 100 mg     Medication Prescriber:  Gary Rodrigues MD  Taking meds as prescribed? Yes  Medication side effects or concerns:  Denies  Outside medical appointments this week (list provider and reason for visit):  None scheduled    23-23: Client endorses the following biomedical issues/concerns: Aches and pains in legs. He also expressed \"Not feeling like himself during group on Thursday. Clients records indicate that he went to ER on 23 for symptoms of lethargy and stomach pains and was admitted to the hospital.    Dimension 3: Emotional/Behavioral Conditions & Complications -   Previous Dimension Ratin  Current Dimension Ratin  PHQ2:   PHQ-2 (  [employer]) 2023   Q1: Little interest or pleasure in doing things 0 3   Q2: Feeling down, depressed or hopeless 0 3   PHQ-2 Score 0 6      GAD2:   WILDA-2 1/15/2023 2023   Feeling nervous, anxious, or on edge 0 1   Not being able to stop or control worrying 0 0   WILDA-2 Total Score 0 1 "     PROMIS 10-Global Health (all questions and answers displayed):   PROMIS 10 1/15/2023 1/16/2023   In general, would you say your health is: Poor -   In general, would you say your quality of life is: Fair -   In general, how would you rate your physical health? Poor -   In general, how would you rate your mental health, including your mood and your ability to think? Fair -   In general, how would you rate your satisfaction with your social activities and relationships? Good -   In general, please rate how well you carry out your usual social activities and roles Poor -   To what extent are you able to carry out your everyday physical activities such as walking, climbing stairs, carrying groceries, or moving a chair? A little -   How often have you been bothered by emotional problems such as feeling anxious, depressed or irritable? Sometimes -   How would you rate your fatigue on average? Severe -   How would you rate your pain on average?   0 = No Pain  to  10 = Worst Imaginable Pain 4 -   In general, would you say your health is: 1 4   In general, would you say your quality of life is: 2 3   In general, how would you rate your physical health? 1 2   In general, how would you rate your mental health, including your mood and your ability to think? 2 5   In general, how would you rate your satisfaction with your social activities and relationships? 3 2   In general, please rate how well you carry out your usual social activities and roles. (This includes activities at home, at work and in your community, and responsibilities as a parent, child, spouse, employee, friend, etc.) 1 3   To what extent are you able to carry out your everyday physical activities such as walking, climbing stairs, carrying groceries, or moving a chair? 2 3   In the past 7 days, how often have you been bothered by emotional problems such as feeling anxious, depressed, or irritable? 3 2   In the past 7 days, how would you rate your fatigue on  "average? 4 4   In the past 7 days, how would you rate your pain on average, where 0 means no pain, and 10 means worst imaginable pain? 4 6   Global Mental Health Score 10 14   Global Physical Health Score 8 10   PROMIS TOTAL - SUBSCORES 18 24   Some recent data might be hidden     Mental health diagnosis Autisim spectrum disorder and depression.   Date of last SIB:  Denies  Date of  last SI:  Denies  Date of last HI: Denies  Behavioral Targets:    Risk factors:  Lacks insight into addiction/mental health, lacks daily structure  Protective factors:  positive relationships positive family connections  Current MH Assignments:  None at this time    Narrative:  Current Mental Health symptoms include: Client reports Autisim spectrum disorder and depression. Active interventions to stabilize mental health symptoms this week : Walking away and practicing deep breathing when he is feeling frustrated or angry.     23-23: He rates the following on a scale of 0-10 (10 being highest), Depression 1-2,  Anxiety 2-3 . Client was unable to identify any mental health symptoms despite counselor sharing multiple examples. He expresses coping by: Staying with family, remind myself that \"if I drink, I'll be dead\". Client attended mental health group and participated well.     Dimension 4: Treatment Acceptance / Resistance -   Previous Dimension Ratin  Current Dimension Ratin  ROBERT Diagnosis:  Alcohol Use Disorder   303.90 (F10.20) Severe .  in early remission  Commitment to tx process/Stage of change- Contemplation   ROBERT assignments - 10 harmful consequences and Identifying triggers and cues.     Narrative :Client reports motivation for treatment being: I want to be here because I would like professional help to get a new liver so I can continue to live the healthy live I have envisioned for myself. He reports ability to abstain from alcohol since being informed of Liver problems. He verbalizes willingness to complete " any/all recommendations of and expectations of the program. His behaviors will be monitored to see if they corollate with his words.     1/16/23-1/22/23: He rated his Motivation at an 11 out of 10 (10 being highest). Client met with ROBERT counselor to develop his treatment plan. He attended all treatment sessions this week and actively participated.    Dimension 5: Relapse / Continued Problem Potential -   Previous Dimension Rating:  3  Current Dimension Rating:  3  Relapses this week -  Denies  Urges to use -  Denies  UA results -   Recent Results (from the past 168 hour(s))   Ammonia (on ice)    Collection Time: 01/21/23  7:54 AM   Result Value Ref Range    Ammonia 77 (H) 11 - 35 umol/L   Hepatic function panel    Collection Time: 01/21/23  7:54 AM   Result Value Ref Range    Bilirubin Total 11.9 (H) 0.0 - 1.0 mg/dL    Bilirubin Direct 2.5 (H) <=0.5 mg/dL    Protein Total 7.8 6.0 - 8.0 g/dL    Albumin 3.3 (L) 3.5 - 5.0 g/dL    Alkaline Phosphatase 132 (H) 45 - 120 U/L    AST 74 (H) 0 - 40 U/L    ALT 39 0 - 45 U/L   Basic metabolic panel    Collection Time: 01/21/23  7:54 AM   Result Value Ref Range    Sodium 132 (L) 136 - 145 mmol/L    Potassium 5.0 3.5 - 5.0 mmol/L    Chloride 97 (L) 98 - 107 mmol/L    Carbon Dioxide (CO2) 27 22 - 31 mmol/L    Anion Gap 8 5 - 18 mmol/L    Urea Nitrogen 19 8 - 22 mg/dL    Creatinine 0.77 0.70 - 1.30 mg/dL    Calcium 9.8 8.5 - 10.5 mg/dL    Glucose 179 (H) 70 - 125 mg/dL    GFR Estimate >90 >60 mL/min/1.73m2   Alcohol level blood    Collection Time: 01/21/23  7:54 AM   Result Value Ref Range    Alcohol, Blood <10 None detected mg/dL   INR    Collection Time: 01/21/23  7:54 AM   Result Value Ref Range    INR 1.54 (H) 0.85 - 1.15   Blood Culture Peripheral Blood    Collection Time: 01/21/23  7:54 AM    Specimen: Peripheral Blood   Result Value Ref Range    Culture No growth after 1 day    Magnesium    Collection Time: 01/21/23  7:54 AM   Result Value Ref Range    Magnesium 1.8 1.8 -  2.6 mg/dL   CBC with platelets and differential    Collection Time: 01/21/23  7:54 AM   Result Value Ref Range    WBC Count 8.8 4.0 - 11.0 10e3/uL    RBC Count 2.35 (L) 4.40 - 5.90 10e6/uL    Hemoglobin 8.7 (L) 13.3 - 17.7 g/dL    Hematocrit 24.7 (L) 40.0 - 53.0 %     (H) 78 - 100 fL    MCH 37.0 (H) 26.5 - 33.0 pg    MCHC 35.2 31.5 - 36.5 g/dL    RDW 13.4 10.0 - 15.0 %    Platelet Count 129 (L) 150 - 450 10e3/uL    % Neutrophils 73 %    % Lymphocytes 18 %    % Monocytes 4 %    % Eosinophils 2 %    % Basophils 1 %    % Immature Granulocytes 2 %    NRBCs per 100 WBC 0 <1 /100    Absolute Neutrophils 6.4 1.6 - 8.3 10e3/uL    Absolute Lymphocytes 1.6 0.8 - 5.3 10e3/uL    Absolute Monocytes 0.4 0.0 - 1.3 10e3/uL    Absolute Eosinophils 0.1 0.0 - 0.7 10e3/uL    Absolute Basophils 0.1 0.0 - 0.2 10e3/uL    Absolute Immature Granulocytes 0.2 <=0.4 10e3/uL    Absolute NRBCs 0.0 10e3/uL   Adult Type and Screen    Collection Time: 01/21/23  7:54 AM   Result Value Ref Range    ABO/RH(D) A NEG     Antibody Screen Negative Negative    SPECIMEN EXPIRATION DATE [id number]    Blood Culture Peripheral Blood    Collection Time: 01/21/23  8:16 AM    Specimen: Peripheral Blood   Result Value Ref Range    Culture No growth after 1 day    Asymptomatic COVID-19 Virus (Coronavirus) by PCR Nasopharyngeal    Collection Time: 01/21/23  8:56 AM    Specimen: Nasopharyngeal; Swab   Result Value Ref Range    SARS CoV2 PCR Negative Negative   Glucose by meter    Collection Time: 01/21/23  5:20 PM   Result Value Ref Range    GLUCOSE BY METER POCT 97 70 - 99 mg/dL   Glucose by meter    Collection Time: 01/21/23  8:06 PM   Result Value Ref Range    GLUCOSE BY METER POCT 125 (H) 70 - 99 mg/dL   CBC with platelets and differential    Collection Time: 01/21/23  8:50 PM   Result Value Ref Range    WBC Count 8.5 4.0 - 11.0 10e3/uL    RBC Count 2.32 (L) 4.40 - 5.90 10e6/uL    Hemoglobin 8.6 (L) 13.3 - 17.7 g/dL    Hematocrit 23.7 (L) 40.0 - 53.0 %      (H) 78 - 100 fL    MCH 37.1 (H) 26.5 - 33.0 pg    MCHC 36.3 31.5 - 36.5 g/dL    RDW 13.2 10.0 - 15.0 %    Platelet Count 127 (L) 150 - 450 10e3/uL    % Neutrophils 72 %    % Lymphocytes 20 %    % Monocytes 4 %    % Eosinophils 2 %    % Basophils 1 %    % Immature Granulocytes 1 %    NRBCs per 100 WBC 0 <1 /100    Absolute Neutrophils 6.2 1.6 - 8.3 10e3/uL    Absolute Lymphocytes 1.7 0.8 - 5.3 10e3/uL    Absolute Monocytes 0.4 0.0 - 1.3 10e3/uL    Absolute Eosinophils 0.1 0.0 - 0.7 10e3/uL    Absolute Basophils 0.1 0.0 - 0.2 10e3/uL    Absolute Immature Granulocytes 0.1 <=0.4 10e3/uL    Absolute NRBCs 0.0 10e3/uL   Glucose by meter    Collection Time: 01/22/23  6:57 AM   Result Value Ref Range    GLUCOSE BY METER POCT 282 (H) 70 - 99 mg/dL   Basic metabolic panel    Collection Time: 01/22/23 10:02 AM   Result Value Ref Range    Sodium 132 (L) 136 - 145 mmol/L    Potassium 4.1 3.5 - 5.0 mmol/L    Chloride 95 (L) 98 - 107 mmol/L    Carbon Dioxide (CO2) 24 22 - 31 mmol/L    Anion Gap 13 5 - 18 mmol/L    Urea Nitrogen 28 (H) 8 - 22 mg/dL    Creatinine 0.97 0.70 - 1.30 mg/dL    Calcium 9.4 8.5 - 10.5 mg/dL    Glucose 175 (H) 70 - 125 mg/dL    GFR Estimate >90 >60 mL/min/1.73m2   CBC with platelets and differential    Collection Time: 01/22/23 10:02 AM   Result Value Ref Range    WBC Count 11.8 (H) 4.0 - 11.0 10e3/uL    RBC Count 2.36 (L) 4.40 - 5.90 10e6/uL    Hemoglobin 8.7 (L) 13.3 - 17.7 g/dL    Hematocrit 24.6 (L) 40.0 - 53.0 %     (H) 78 - 100 fL    MCH 36.9 (H) 26.5 - 33.0 pg    MCHC 35.4 31.5 - 36.5 g/dL    RDW 13.2 10.0 - 15.0 %    Platelet Count 142 (L) 150 - 450 10e3/uL    % Neutrophils 73 %    % Lymphocytes 18 %    % Monocytes 5 %    % Eosinophils 1 %    % Basophils 1 %    % Immature Granulocytes 2 %    NRBCs per 100 WBC 0 <1 /100    Absolute Neutrophils 8.6 (H) 1.6 - 8.3 10e3/uL    Absolute Lymphocytes 2.2 0.8 - 5.3 10e3/uL    Absolute Monocytes 0.6 0.0 - 1.3 10e3/uL    Absolute Eosinophils  "0.1 0.0 - 0.7 10e3/uL    Absolute Basophils 0.2 0.0 - 0.2 10e3/uL    Absolute Immature Granulocytes 0.2 <=0.4 10e3/uL    Absolute NRBCs 0.0 10e3/uL   Glucose by meter    Collection Time: 01/22/23 11:46 AM   Result Value Ref Range    GLUCOSE BY METER POCT 161 (H) 70 - 99 mg/dL     Using ReSet Gini: No: Not at this time    Narrative- Client reports one previous inpatient treatment stating he was able to abstain from alcohol for 14 days upon discharge. He explains circumstances around his return to use relating to his mothers physical health and being worried. He stated once he found out she was going to be ok, he drank. Client acknowledges longest period of sobriety being 42 days, of which more than half of that time was spent in structured setting. Client struggles to identify any triggers or high risk situations as he states, \"I have no desire to drink\". Client remains at high risk for relapse for reasons including but not limiting to his history of continued use despite negative consequences, lack of healthy strategies to cope with mental and chemical health symptoms, unable to identify triggers or symptoms that could lead to increased risk of relapse, and lacks any type of structured routine activities.     1/16/23-1/22/23:Client rates cravings at 1 out of 10 (10 being highest). He reported triggers being: None stating \"It has been awhile, I have developed a routine\". He coped by: \"Knowing accountability\". Client attended all ROBERT groups gaining insight into stages of relapse, sharing treatment assignments and introduction of new peers into the group.     Dimension 6: Recovery Environment -   Previous Dimension Rating:  3  Current Dimension Rating:  3  Family Involvement - Yes  Summarize attendance at family groups and family sessions - NA  Family supportive of treatment?  Yes    Community support group attendance - See below  Recreational activities - See below    Narrative - Client resides with both parents " "acknowledging they are very supportive and helpful to his recovery. Client is on disability and he is mother aids him with completing day to day responsibilities. He reports having a \"Lady friend\" that is supportive. He is unemployed and not involved in any time of education, spiritual, recreational, or community centered activities. He lacks structure and peer support network. Client does not have a license and he denies any criminal justice history.    1/16/23-1/22/23: Client did not attend a self help meeting this past week.  He participated in the following recreational activities: Organized his room and played guitar.    Progress made on transition planning goals: Developed treatment plan and attended all group/individual sessions this week.     Justification for Continued Treatment at this Level of Care:  Lacks insight into addiction and mental health, lacks daily structure or routine, is not involved in any sober activities/support groups, lacks peer support, and healthy coping strategies    Treatment coordination activities this week:  Family (Mother)  Need for peer recovery support referral? No    Discharge Planning:  Target Discharge Date/Timeframe:  6/20/23  Target Phase 2 Start:  3/6/23  Target Phase 3 Start: 4/24/23   Med Mgmt Provider/Appt:  DARBY   Ind therapy Provider/Appt:  DARBY   Family therapy Provider/Appt:  DARBY   Other referrals: TBD      Has vulnerable adult status change? No    Interdisciplinary Clinical Supervision including: Mental health professional    Are Treatment Plan goals/objectives effective? Yes  *If no, list changes to treatment plan:    Are the current goals meeting client's needs? Yes  *If no, list the changes to treatment plan.    Client Input / Response: Very verbal during group sessions, states he is getting use to the routine of group hours, and appears to be pushing himself to make sure he is consistent with participating in treatment activities.       *Client agrees with any " changes to the treatment plan: N/A  *Client received copy of changes: N/A  *Client is aware of right to access a treatment plan review: Yes     YAMILKA Chan, [facility]

## 2023-01-22 NOTE — PLAN OF CARE
" Goal Outcome Evaluation:  Pt AO x4. Confused and forgetful at times. Mom in room.  Pt pulled IV. Placed a new IV on right forearm. Pt's gums/mouth is bleeding; mom/pt reported as baseline. Pt denied pain.     Problem: Plan of Care - These are the overarching goals to be used throughout the patient stay.    Goal: Plan of Care Review  Description: The Plan of Care Review/Shift note should be completed every shift.  The Outcome Evaluation is a brief statement about your assessment that the patient is improving, declining, or no change.  This information will be displayed automatically on your shift note.  Outcome: Progressing  Goal: Patient-Specific Goal (Individualized)  Description: You can add care plan individualizations to a care plan. Examples of Individualization might be:  \"Parent requests to be called daily at 9am for status\", \"I have a hard time hearing out of my right ear\", or \"Do not touch me to wake me up as it startles me\".  Outcome: Progressing  Goal: Absence of Hospital-Acquired Illness or Injury  Outcome: Progressing  Goal: Optimal Comfort and Wellbeing  Outcome: Progressing  Goal: Readiness for Transition of Care  Outcome: Progressing     Problem: Fall Injury Risk  Goal: Absence of Fall and Fall-Related Injury  Outcome: Progressing     Problem: Thought Process Alteration  Goal: Optimal Thought Clarity  Outcome: Progressing     Problem: Bowel Elimination Management  Goal: Effective Bowel Elimination/Continence  Outcome: Progressing                          "

## 2023-01-22 NOTE — CONSULTS
Care Management Initial Consult    General Information  Assessment completed with: Patient, Parents (mother Leticia ([phone])),    Type of CM/SW Visit: Initial Assessment    Primary Care Provider verified and updated as needed:     Readmission within the last 30 days: no previous admission in last 30 days      Reason for Consult: discharge planning  Advance Care Planning: Advance Care Planning Reviewed: other (see comments) (requested a copy from mother Leticia)          Communication Assessment  Patient's communication style: spoken language (English or Bilingual)    Hearing Difficulty or Deaf: no   Wear Glasses or Blind: yes    Cognitive  Cognitive/Neuro/Behavioral: .WDL except, level of consciousness  Level of Consciousness: alert, confused  Arousal Level: opens eyes spontaneously  Orientation: disoriented to, situation  Mood/Behavior: anxious  Best Language: 0 - No aphasia  Speech: forced/pressured    Living Environment:   People in home: parent(s)     Current living Arrangements: house (split level, 6 steps up, 6 steps down)      Able to return to prior arrangements: yes       Family/Social Support:  Care provided by: self, parent(s)  Provides care for: no one, unable/limited ability to care for self                Description of Support System:           Current Resources:   Patient receiving home care services: No     Community Resources:  (Alcoholics Anonymous)  Equipment currently used at home: none  Supplies currently used at home: Diabetic Supplies    Employment/Financial:  Employment Status: unemployed        Financial Concerns: No concerns identified           Lifestyle & Psychosocial Needs:  Social Determinants of Health     Tobacco Use: Medium Risk     Smoking Tobacco Use: Former     Smokeless Tobacco Use: Never     Passive Exposure: Not on file   Alcohol Use: Not on file   Financial Resource Strain: Not on file   Food Insecurity: Not on file   Transportation Needs: Not on file   Physical Activity:  "Not on file   Stress: Not on file   Social Connections: Not on file   Intimate Partner Violence: Not on file   Depression: Not at risk     PHQ-2 Score: 0   Housing Stability: Not on file       Functional Status:  Prior to admission patient needed assistance:   Dependent ADLs:: Independent  Dependent IADLs:: Cleaning, Cooking, Laundry, Shopping, Meal Preparation, Medication Management, Money Management, Transportation       Mental Health Status:  Mental Health Status: No Current Concerns  Mental Health Management: Medication    Chemical Dependency Status:  Chemical Dependency Status: Past Concern (sober from alcohol since April 2022)             Values/Beliefs:  Spiritual, Cultural Beliefs, Yazidi Practices, Values that affect care: no               Additional Information:  RNCM met with patient and introduced CM role, offered discharge planning.  Pt has autism, high functioning.  Pt alert, oriented to person and place.  Pt's mother Leticia Salter present at bedside and answered questions ([phone]).  Pt lives at home in a split-level house with his mother and his dad Wes.  Split level home with 6 steps to upstairs, 6 steps to downstairs.  Pt has a history of alcohol use but has been sober since he was April 2022.  Pt is followed by Hepatology team at [facility].  Pt is participating in Alcoholics Anonymous in attempt to get on the transplant list; declined any chemical dependency resources.  Pt is independent with ADL's but performs everything \"very slowly\" per pt's mother.  All IADL's managed by pt's parents.  Leticia requested PT/OT eval, hopeful to get home PT/OT if appropriate; writer notified doctor.  Anticipate discharge to home /home with homecare.  Family to transport.  CM will continue to follow.    Elida Blackwell RN/CM      "

## 2023-01-22 NOTE — ADDENDUM NOTE
Encounter addended by: Noa Bermudez LADC on: 1/22/2023 5:37 PM   Actions taken: Clinical Note Signed

## 2023-01-22 NOTE — UTILIZATION REVIEW
Admission Status; Secondary Review Determination       Under the authority of the Utilization Management Committee, the utilization review process indicated a secondary review on the above patient. The review outcome is based on review of the medical records, discussions with staff, and applying clinical experience noted on the date of the review.     (x) Inpatient Status Appropriate - This patient's medical care is consistent with medical management for inpatient care and reasonable inpatient medical practice.     RATIONALE FOR DETERMINATION     Mr. Salter is a 26 yo male pt with a PMH of alcoholic liver cirrhosis and esophagel varices (s/p banding) who presents to the ED with acute metabolic encephalopathy.  CT of the head negative for acute path; noted to have elevated ammonia level. Suspected to be, in part, d/t hepatic encephalopathy as his mentation is starting to improve with lactulose given, but not yet at his baseline.      Today, he has a new leukocytosis and is tachycardic with clinical concern for SIRS.  He has been started on IV abx and US guided paracentesis requested.      He is requiring ongoing inpatient evaluation and treatment today.     At the time of admission with the information available to the attending physician more than 2 nights Hospital complex care was anticipated, based on patient risk of adverse outcome if treated as outpatient and complex care required. Inpatient admission is appropriate based on the Medicare guidelines.       The information on this document is developed by the utilization review team in order for the business office to ensure compliance. This only denotes the appropriateness of proper admission status and does not reflect the quality of care rendered.   The definitions of Inpatient Status and Observation Status used in making the determination above are those provided in the CMS Coverage Manual, Chapter 1 and Chapter 6, section 70.4.         Sincerely,     Nadeen  Dewey, DO  Utilization Review  Physician Advisor  [facility].

## 2023-01-22 NOTE — PLAN OF CARE
Goal Outcome Evaluation:      Plan of Care Reviewed With: patient, parent    Overall Patient Progress: improving    Outcome Evaluation: Patient is alert and awake while eating dinner, picked at food. Oriented to person and place. Had 4 BMs so far today. Had red blood in his stool. Lactulose held. Ambulating to bathroom with 1 assist; continent but had urgency. Report given to CONI Albrecht.      Problem: Plan of Care - These are the overarching goals to be used throughout the patient stay.    Goal: Patient-Specific Goal (Individualized)  Outcome: Progressing  Flowsheets (Taken 1/21/2023 2041)  Individualized Care Needs: Mother at bedside  Anxieties, Fears or Concerns: Concerned about bleeding  Patient/Family-Specific Goals (Include Timeframe): Settle in to a room on the inpatient unit tonight.     Problem: Plan of Care - These are the overarching goals to be used throughout the patient stay.    Goal: Absence of Hospital-Acquired Illness or Injury  Outcome: Progressing  Intervention: Identify and Manage Fall Risk  Recent Flowsheet Documentation  Taken 1/21/2023 2030 by Kimi Mijares RN  Safety Promotion/Fall Prevention:    activity supervised    nonskid shoes/slippers when out of bed    patient and family education    fall prevention program maintained  Intervention: Prevent Skin Injury  Recent Flowsheet Documentation  Taken 1/21/2023 2030 by Kimi Mijares RN  Body Position: position changed independently    Problem: Plan of Care - These are the overarching goals to be used throughout the patient stay.    Goal: Optimal Comfort and Wellbeing  Outcome: Progressing    Problem: Thought Process Alteration  Goal: Optimal Thought Clarity  Outcome: Progressing  Intervention: Minimize Safety Risk and Altered Thought  Recent Flowsheet Documentation  Taken 1/21/2023 2030 by Kimi Mijares RN  Enhanced Safety Measures: room near unit station     Problem: Bowel Elimination Management  Goal: Effective Bowel  Elimination/Continence  Outcome: Progressing

## 2023-01-22 NOTE — PROGRESS NOTES
Reported some blood in stool --- on going lactulose treatment ---- No Belly pain. Vitals are normal    Plans  - check Hgb, if less than 7 - consider PRBC  - if bleeding recurs, consider further evaluation

## 2023-01-22 NOTE — PROGRESS NOTES
[facility]    Medicine Progress Note - Hospitalist Service    Date of Admission:  1/21/2023    Assessment & Plan      Dillon Salter is a 27 year old male admitted on 1/21/2023. He has a history of alcoholic cirrhosis diagnosed April 2022, esophageal varices s/p banding, hepatic encephalopathy, thrombocytopenia, Autism spectrum disorder, anxiety/depression, HTN, and multifactorial chronic anemia presents with acute metabolic encephalopathy likely from non-adherence to home medications including lactulose. Lactulose was stopped completely several weeks ago and all medications were not administered at home for 48-hours. Start lactulose and titrate to clinical improvement, IV ceftriaxone given variceal bleeding history and decompensated cirrhosis, and diagnostic paracentesis if able to draw fluid.     Clinically mentation clearing. BMP pending. Holding spironolactone for mild hyperkalemia. Continue course.     Metabolic Encephalopathy - likely Hepatic Encephalopathy  Non-Adherence / Medication Non-Compliance  Alcoholic Cirrhosis - sober since 4/2022, on hepatology care team at [facility] with Dr. Wood  -Current presentation most likely from medication non-adherence. Lactulose was stopped several weeks ago (estimated 4-6 weeks) and all home prescribed medications not given over the past 2-days.   -His mother is his caretaker and education provided on reason/rationale for his prescribed medications, of particularly import the lactulose and rifaximin for HE.   -Order lactulose 15 g TID with hold parameters - may titrate (up or down if loose stools) to goal of at least 3-4 moderate/large BMs.   -Order rifaximin.   -Diagnostic paracentesis with labs and cell count    Hyperkalemia  -Hold home spironolactone  -Follow K, repeat BMP ordered/pending    MDD/Anxiety  -Order home medications.     HTN  -Order home medications and as needed apply specified hold parameters.     ASD  -Order home medications  -Mother  is his caretaker  -Education provided to both patient and his mother  -CM/SW         Diet: Combination Diet Regular Diet Adult  Room Service    DVT Prophylaxis: Pneumatic Compression Devices, Anti-embolisim stockings (TEDs), Ambulate every shift and VTE Prophylaxis contraindicated due to upcoming paracentesis today/tomorrow  Negron Catheter: Not present  Lines: None     Cardiac Monitoring: None  Code Status: Full Code      Clinically Significant Risk Factors Present on Admission           # Hypercalcemia: corrected calcium is >10.1, will monitor as appropriate    # Hypoalbuminemia: Lowest albumin = 3.3 g/dL at 1/21/2023  7:54 AM, will monitor as appropriate  # Coagulation Defect: INR = 1.54 (Ref range: 0.85 - 1.15) and/or PTT = N/A, will monitor for bleeding  # Thrombocytopenia: Lowest platelets = 127 in last 2 days, will monitor for bleeding        # Overweight: Estimated body mass index is 28.55 kg/m  as calculated from the following:    Height as of this encounter: 1.524 m (5').    Weight as of this encounter: 66.3 kg (146 lb 3.2 oz).           Disposition Plan             Saeid Gonzalez MD  Hospitalist Service  [facility]  Securely message with [employer] (more info)  Text page via [facility] Paging/Directory   ______________________________________________________________________    Interval History   No acute events overnight.    He is much more awake, alert, and up and working on his laptop. On speech and conversation, he is very forgetful, tangential, and repetitive. He is trying hard and just being able to stay awake and hold some form of conversation, though not near baseline yet, is a large improvement from previous. K is mildly elevated. Did discussed with RN at bedside. No report of chest pain, shortness of breath, or other new complaints. Discussed plan of care with patient. Answered all questions to patient's verbalized understanding and satisfaction.    Physical Exam   Vital Signs:  Temp: 98.6  F (37  C) Temp src: Oral BP: (!) 155/65 Pulse: 103   Resp: 18 SpO2: 100 % O2 Device: None (Room air)    Weight: 146 lbs 3.2 oz  GENERAL:  Alert, appears comfortable, in no acute distress, appears stated age   HEAD:  Normocephalic, without obvious abnormality, atraumatic   EYES:  PERRL, conjunctiva/corneas clear, + bilateral scleral icterus, EOM's intact   NOSE: Nares normal, septum midline, mucosa normal, no drainage   THROAT: Lips, mucosa, and tongue normal; teeth and gums normal, mouth moist   NECK: Supple, symmetrical, trachea midline   BACK:   Symmetric, no curvature, ROM normal   LUNGS:   Clear to auscultation bilaterally, no rales, rhonchi, or wheezing, symmetric chest rise on inhalation, respirations unlabored   CHEST WALL:  No tenderness or deformity   HEART:  Regular rate and rhythm, S1 and S2 normal, no murmur, rub, or gallop    ABDOMEN:   Soft, non-tender, bowel sounds active all four quadrants, no masses, no organomegaly, no rebound or guarding   EXTREMITIES: Extremities normal, atraumatic, no cyanosis or edema    SKIN: Jaundiced throughout; Dry to touch, no other exanthems in the visualized areas   NEURO: Alert, oriented x 3 with some confusion and forgetfulness, moves all four extremities freely/spontaneously   PSYCH: Cooperative, behavior is appropriate given context (hepatic encephalopathy), forgetful, repetitive, tangential          Medical Decision Making     52 MINUTES SPENT BY ME on the date of service doing chart review, history, exam, documentation & further activities per the note.      Data     I have personally reviewed the following data over the past 24 hrs:    8.5  \   8.6 (L)   / 127 (L)     132 (L) 97 (L) 19 /  125 (H)   5.0 27 0.77 \       ALT: 39 AST: 74 (H) AP: 132 (H) TBILI: 11.9 (H)   ALB: 3.3 (L) TOT PROTEIN: 7.8 LIPASE: N/A       INR:  1.54 (H) PTT:  N/A   D-dimer:  N/A Fibrinogen:  N/A       Imaging results reviewed over the past 24 hrs:   Recent Results (from the  past 24 hour(s))   Head CT w/o contrast    Narrative    EXAM: CT HEAD WITHOUT CONTRAST  LOCATION: [facility]  DATE/TIME: 01/21/2023, 8:33 AM    INDICATION: Confusion.  COMPARISON: CT brain 08/18/2022.  TECHNIQUE: Routine CT Head without IV contrast. Multiplanar reformats. Dose reduction techniques were used.    FINDINGS:  INTRACRANIAL CONTENTS: Exam is degraded by motion artifact. No finding for intracranial hemorrhage, mass, or acute infarct. The ventricles are normal in size. Normal gray-white matter differentiation. No mass effect or midline shift.    Cerebellar tonsils are normally positioned. Sella is unremarkable for technique. Corpus callosum is normally formed.    VISUALIZED ORBITS/SINUSES/MASTOIDS: Orbits are unremarkable. Visualized paranasal sinuses, middle ear cavities, and mastoid air cells are clear.    BONES/SOFT TISSUES: Calvarium is intact, without fracture or suspicious lytic or blastic foci.      Impression    IMPRESSION:  1.  No finding for intracranial hemorrhage, mass, or acute infarct.

## 2023-01-22 NOTE — PLAN OF CARE
Goal Outcome Evaluation:         Patient denies pain. With 3 Bms today since 0000, last was not loose so will given the 1400 lactulose. Patient with mother in room and independent with transfers. Cognition improving, just forgetful at times. Patient with bleeding gums noted-mother states he has had this, MD is aware.

## 2023-01-23 ENCOUNTER — ANESTHESIA (OUTPATIENT)
Dept: SURGERY | Facility: CLINIC | Age: 28
DRG: 441 | End: 2023-01-23
Payer: COMMERCIAL

## 2023-01-23 ENCOUNTER — ANESTHESIA EVENT (OUTPATIENT)
Dept: SURGERY | Facility: CLINIC | Age: 28
DRG: 441 | End: 2023-01-23
Payer: COMMERCIAL

## 2023-01-23 ENCOUNTER — APPOINTMENT (OUTPATIENT)
Dept: PHYSICAL THERAPY | Facility: CLINIC | Age: 28
DRG: 441 | End: 2023-01-23
Attending: HOSPITALIST
Payer: COMMERCIAL

## 2023-01-23 LAB
BASOPHILS # BLD AUTO: 0.1 10E3/UL (ref 0–0.2)
BASOPHILS NFR BLD AUTO: 1 %
EOSINOPHIL # BLD AUTO: 0.1 10E3/UL (ref 0–0.7)
EOSINOPHIL NFR BLD AUTO: 1 %
ERYTHROCYTE [DISTWIDTH] IN BLOOD BY AUTOMATED COUNT: 13.5 % (ref 10–15)
GLUCOSE BLDC GLUCOMTR-MCNC: 105 MG/DL (ref 70–99)
GLUCOSE BLDC GLUCOMTR-MCNC: 145 MG/DL (ref 70–99)
GLUCOSE BLDC GLUCOMTR-MCNC: 150 MG/DL (ref 70–99)
GLUCOSE BLDC GLUCOMTR-MCNC: 206 MG/DL (ref 70–99)
GLUCOSE BLDC GLUCOMTR-MCNC: 342 MG/DL (ref 70–99)
HCT VFR BLD AUTO: 22.8 % (ref 40–53)
HGB BLD-MCNC: 8.2 G/DL (ref 13.3–17.7)
HOLD SPECIMEN: NORMAL
IMM GRANULOCYTES # BLD: 0.2 10E3/UL
IMM GRANULOCYTES NFR BLD: 1 %
LYMPHOCYTES # BLD AUTO: 1.8 10E3/UL (ref 0.8–5.3)
LYMPHOCYTES NFR BLD AUTO: 14 %
MCH RBC QN AUTO: 37.6 PG (ref 26.5–33)
MCHC RBC AUTO-ENTMCNC: 36 G/DL (ref 31.5–36.5)
MCV RBC AUTO: 105 FL (ref 78–100)
MONOCYTES # BLD AUTO: 0.5 10E3/UL (ref 0–1.3)
MONOCYTES NFR BLD AUTO: 4 %
NEUTROPHILS # BLD AUTO: 10 10E3/UL (ref 1.6–8.3)
NEUTROPHILS NFR BLD AUTO: 79 %
NRBC # BLD AUTO: 0 10E3/UL
NRBC BLD AUTO-RTO: 0 /100
PLATELET # BLD AUTO: 147 10E3/UL (ref 150–450)
RBC # BLD AUTO: 2.18 10E6/UL (ref 4.4–5.9)
UPPER GI ENDOSCOPY: NORMAL
WBC # BLD AUTO: 12.8 10E3/UL (ref 4–11)

## 2023-01-23 PROCEDURE — 99418 PROLNG IP/OBS E/M EA 15 MIN: CPT | Performed by: HOSPITALIST

## 2023-01-23 PROCEDURE — 250N000011 HC RX IP 250 OP 636: Performed by: NURSE ANESTHETIST, CERTIFIED REGISTERED

## 2023-01-23 PROCEDURE — 258N000003 HC RX IP 258 OP 636: Performed by: INTERNAL MEDICINE

## 2023-01-23 PROCEDURE — 258N000003 HC RX IP 258 OP 636: Performed by: ANESTHESIOLOGY

## 2023-01-23 PROCEDURE — 250N000011 HC RX IP 250 OP 636: Mod: JA | Performed by: PHYSICIAN ASSISTANT

## 2023-01-23 PROCEDURE — 258N000003 HC RX IP 258 OP 636: Performed by: PHYSICIAN ASSISTANT

## 2023-01-23 PROCEDURE — 36415 COLL VENOUS BLD VENIPUNCTURE: CPT | Performed by: HOSPITALIST

## 2023-01-23 PROCEDURE — 250N000011 HC RX IP 250 OP 636: Performed by: INTERNAL MEDICINE

## 2023-01-23 PROCEDURE — 710N000010 HC RECOVERY PHASE 1, LEVEL 2, PER MIN: Performed by: INTERNAL MEDICINE

## 2023-01-23 PROCEDURE — 999N000141 HC STATISTIC PRE-PROCEDURE NURSING ASSESSMENT: Performed by: INTERNAL MEDICINE

## 2023-01-23 PROCEDURE — 99233 SBSQ HOSP IP/OBS HIGH 50: CPT | Performed by: HOSPITALIST

## 2023-01-23 PROCEDURE — 120N000001 HC R&B MED SURG/OB

## 2023-01-23 PROCEDURE — 80321 ALCOHOLS BIOMARKERS 1OR 2: CPT | Performed by: INTERNAL MEDICINE

## 2023-01-23 PROCEDURE — 370N000017 HC ANESTHESIA TECHNICAL FEE, PER MIN: Performed by: INTERNAL MEDICINE

## 2023-01-23 PROCEDURE — 250N000011 HC RX IP 250 OP 636: Performed by: HOSPITALIST

## 2023-01-23 PROCEDURE — 258N000003 HC RX IP 258 OP 636: Performed by: NURSE ANESTHETIST, CERTIFIED REGISTERED

## 2023-01-23 PROCEDURE — 272N000001 HC OR GENERAL SUPPLY STERILE: Performed by: INTERNAL MEDICINE

## 2023-01-23 PROCEDURE — 250N000013 HC RX MED GY IP 250 OP 250 PS 637: Performed by: HOSPITALIST

## 2023-01-23 PROCEDURE — 360N000075 HC SURGERY LEVEL 2, PER MIN: Performed by: INTERNAL MEDICINE

## 2023-01-23 PROCEDURE — 06L38CZ OCCLUSION OF ESOPHAGEAL VEIN WITH EXTRALUMINAL DEVICE, VIA NATURAL OR ARTIFICIAL OPENING ENDOSCOPIC: ICD-10-PCS | Performed by: INTERNAL MEDICINE

## 2023-01-23 PROCEDURE — 85025 COMPLETE CBC W/AUTO DIFF WBC: CPT | Performed by: HOSPITALIST

## 2023-01-23 PROCEDURE — 97162 PT EVAL MOD COMPLEX 30 MIN: CPT | Mod: GP

## 2023-01-23 PROCEDURE — 97116 GAIT TRAINING THERAPY: CPT | Mod: GP

## 2023-01-23 PROCEDURE — 250N000009 HC RX 250: Performed by: NURSE ANESTHETIST, CERTIFIED REGISTERED

## 2023-01-23 RX ORDER — NALOXONE HYDROCHLORIDE 0.4 MG/ML
0.2 INJECTION, SOLUTION INTRAMUSCULAR; INTRAVENOUS; SUBCUTANEOUS
Status: CANCELLED | OUTPATIENT
Start: 2023-01-23

## 2023-01-23 RX ORDER — LIDOCAINE 40 MG/G
CREAM TOPICAL
Status: DISCONTINUED | OUTPATIENT
Start: 2023-01-23 | End: 2023-01-23 | Stop reason: HOSPADM

## 2023-01-23 RX ORDER — FLUMAZENIL 0.1 MG/ML
0.2 INJECTION, SOLUTION INTRAVENOUS
Status: CANCELLED | OUTPATIENT
Start: 2023-01-23 | End: 2023-01-24

## 2023-01-23 RX ORDER — SODIUM CHLORIDE, SODIUM LACTATE, POTASSIUM CHLORIDE, CALCIUM CHLORIDE 600; 310; 30; 20 MG/100ML; MG/100ML; MG/100ML; MG/100ML
INJECTION, SOLUTION INTRAVENOUS CONTINUOUS
Status: DISCONTINUED | OUTPATIENT
Start: 2023-01-23 | End: 2023-01-23 | Stop reason: HOSPADM

## 2023-01-23 RX ORDER — HYDROMORPHONE HCL IN WATER/PF 6 MG/30 ML
0.2 PATIENT CONTROLLED ANALGESIA SYRINGE INTRAVENOUS EVERY 5 MIN PRN
Status: DISCONTINUED | OUTPATIENT
Start: 2023-01-23 | End: 2023-01-23 | Stop reason: HOSPADM

## 2023-01-23 RX ORDER — PROPOFOL 10 MG/ML
INJECTION, EMULSION INTRAVENOUS PRN
Status: DISCONTINUED | OUTPATIENT
Start: 2023-01-23 | End: 2023-01-23

## 2023-01-23 RX ORDER — PROPOFOL 10 MG/ML
INJECTION, EMULSION INTRAVENOUS CONTINUOUS PRN
Status: DISCONTINUED | OUTPATIENT
Start: 2023-01-23 | End: 2023-01-23

## 2023-01-23 RX ORDER — LACTULOSE 10 G/15ML
20 SOLUTION ORAL 3 TIMES DAILY
Status: DISCONTINUED | OUTPATIENT
Start: 2023-01-23 | End: 2023-01-25

## 2023-01-23 RX ORDER — NALOXONE HYDROCHLORIDE 0.4 MG/ML
0.4 INJECTION, SOLUTION INTRAMUSCULAR; INTRAVENOUS; SUBCUTANEOUS
Status: CANCELLED | OUTPATIENT
Start: 2023-01-23

## 2023-01-23 RX ORDER — HYDROMORPHONE HCL IN WATER/PF 6 MG/30 ML
0.4 PATIENT CONTROLLED ANALGESIA SYRINGE INTRAVENOUS EVERY 5 MIN PRN
Status: DISCONTINUED | OUTPATIENT
Start: 2023-01-23 | End: 2023-01-23 | Stop reason: HOSPADM

## 2023-01-23 RX ORDER — CHLORHEXIDINE GLUCONATE ORAL RINSE 1.2 MG/ML
15 SOLUTION DENTAL 3 TIMES DAILY PRN
Status: DISCONTINUED | OUTPATIENT
Start: 2023-01-23 | End: 2023-01-26 | Stop reason: HOSPADM

## 2023-01-23 RX ORDER — ONDANSETRON 2 MG/ML
4 INJECTION INTRAMUSCULAR; INTRAVENOUS EVERY 30 MIN PRN
Status: DISCONTINUED | OUTPATIENT
Start: 2023-01-23 | End: 2023-01-23 | Stop reason: HOSPADM

## 2023-01-23 RX ORDER — ONDANSETRON 4 MG/1
4 TABLET, ORALLY DISINTEGRATING ORAL EVERY 30 MIN PRN
Status: DISCONTINUED | OUTPATIENT
Start: 2023-01-23 | End: 2023-01-23 | Stop reason: HOSPADM

## 2023-01-23 RX ORDER — FENTANYL CITRATE 50 UG/ML
50 INJECTION, SOLUTION INTRAMUSCULAR; INTRAVENOUS EVERY 5 MIN PRN
Status: DISCONTINUED | OUTPATIENT
Start: 2023-01-23 | End: 2023-01-23 | Stop reason: HOSPADM

## 2023-01-23 RX ORDER — LIDOCAINE HYDROCHLORIDE 10 MG/ML
INJECTION, SOLUTION INFILTRATION; PERINEURAL PRN
Status: DISCONTINUED | OUTPATIENT
Start: 2023-01-23 | End: 2023-01-23

## 2023-01-23 RX ORDER — FENTANYL CITRATE 50 UG/ML
25 INJECTION, SOLUTION INTRAMUSCULAR; INTRAVENOUS EVERY 5 MIN PRN
Status: DISCONTINUED | OUTPATIENT
Start: 2023-01-23 | End: 2023-01-23 | Stop reason: HOSPADM

## 2023-01-23 RX ORDER — ONDANSETRON 2 MG/ML
INJECTION INTRAMUSCULAR; INTRAVENOUS PRN
Status: DISCONTINUED | OUTPATIENT
Start: 2023-01-23 | End: 2023-01-23

## 2023-01-23 RX ORDER — CHLORHEXIDINE GLUCONATE ORAL RINSE 1.2 MG/ML
15 SOLUTION DENTAL ONCE
Status: COMPLETED | OUTPATIENT
Start: 2023-01-23 | End: 2023-01-23

## 2023-01-23 RX ADMIN — PHENYLEPHRINE HYDROCHLORIDE 100 MCG: 10 INJECTION INTRAVENOUS at 13:59

## 2023-01-23 RX ADMIN — LACTULOSE 15 G: 10 SOLUTION ORAL at 07:57

## 2023-01-23 RX ADMIN — INSULIN GLARGINE 16 UNITS: 100 INJECTION, SOLUTION SUBCUTANEOUS at 09:14

## 2023-01-23 RX ADMIN — PROPOFOL 200 MCG/KG/MIN: 10 INJECTION, EMULSION INTRAVENOUS at 13:43

## 2023-01-23 RX ADMIN — PHENYLEPHRINE HYDROCHLORIDE 100 MCG: 10 INJECTION INTRAVENOUS at 13:57

## 2023-01-23 RX ADMIN — PROPOFOL 140 MG: 10 INJECTION, EMULSION INTRAVENOUS at 13:44

## 2023-01-23 RX ADMIN — ONDANSETRON 4 MG: 2 INJECTION INTRAMUSCULAR; INTRAVENOUS at 14:12

## 2023-01-23 RX ADMIN — PHENYLEPHRINE HYDROCHLORIDE 100 MCG: 10 INJECTION INTRAVENOUS at 14:01

## 2023-01-23 RX ADMIN — CEFTRIAXONE 1 G: 1 INJECTION, POWDER, FOR SOLUTION INTRAMUSCULAR; INTRAVENOUS at 10:06

## 2023-01-23 RX ADMIN — PHYTONADIONE 10 MG: 10 INJECTION, EMULSION INTRAMUSCULAR; INTRAVENOUS; SUBCUTANEOUS at 16:07

## 2023-01-23 RX ADMIN — RIFAXIMIN 550 MG: 550 TABLET ORAL at 07:56

## 2023-01-23 RX ADMIN — THERA TABS 1 TABLET: TAB at 07:57

## 2023-01-23 RX ADMIN — PHENYLEPHRINE HYDROCHLORIDE 100 MCG: 10 INJECTION INTRAVENOUS at 13:54

## 2023-01-23 RX ADMIN — SODIUM CHLORIDE, POTASSIUM CHLORIDE, SODIUM LACTATE AND CALCIUM CHLORIDE: 600; 310; 30; 20 INJECTION, SOLUTION INTRAVENOUS at 12:38

## 2023-01-23 RX ADMIN — BUMETANIDE 1 MG: 1 TABLET ORAL at 07:57

## 2023-01-23 RX ADMIN — FOLIC ACID 1 MG: 1 TABLET ORAL at 07:57

## 2023-01-23 RX ADMIN — Medication 100 MG: at 13:44

## 2023-01-23 RX ADMIN — Medication 50 MCG/HR: at 16:45

## 2023-01-23 RX ADMIN — CHLORHEXIDINE GLUCONATE 15 ML: 1.2 SOLUTION ORAL at 09:14

## 2023-01-23 RX ADMIN — PHENYLEPHRINE HYDROCHLORIDE 100 MCG: 10 INJECTION INTRAVENOUS at 13:56

## 2023-01-23 RX ADMIN — LIDOCAINE HYDROCHLORIDE 2 ML: 10 INJECTION, SOLUTION INFILTRATION; PERINEURAL at 13:44

## 2023-01-23 RX ADMIN — PHENYLEPHRINE HYDROCHLORIDE 100 MCG: 10 INJECTION INTRAVENOUS at 13:53

## 2023-01-23 RX ADMIN — Medication 100 MG: at 07:56

## 2023-01-23 RX ADMIN — PHENYLEPHRINE HYDROCHLORIDE 0.3 MCG/KG/MIN: 10 INJECTION INTRAVENOUS at 14:02

## 2023-01-23 RX ADMIN — INSULIN ASPART 9 UNITS: 100 INJECTION, SOLUTION INTRAVENOUS; SUBCUTANEOUS at 09:14

## 2023-01-23 ASSESSMENT — ACTIVITIES OF DAILY LIVING (ADL)
ADLS_ACUITY_SCORE: 34
ADLS_ACUITY_SCORE: 36
ADLS_ACUITY_SCORE: 34
ADLS_ACUITY_SCORE: 41
ADLS_ACUITY_SCORE: 41
ADLS_ACUITY_SCORE: 34
ADLS_ACUITY_SCORE: 36
ADLS_ACUITY_SCORE: 34
ADLS_ACUITY_SCORE: 32
ADLS_ACUITY_SCORE: 36
ADLS_ACUITY_SCORE: 34
ADLS_ACUITY_SCORE: 34

## 2023-01-23 NOTE — ANESTHESIA POSTPROCEDURE EVALUATION
Patient: Dillon Salter    Procedure: Procedure(s):  ESOPHAGOGASTRODUODENOSCOPY (EGD) with esophageal variceal banding       Anesthesia Type:  General    Note:  Disposition: Outpatient   Postop Pain Control: Uneventful            Sign Out: Well controlled pain   PONV: No   Neuro/Psych: Uneventful            Sign Out: Acceptable/Baseline neuro status   Airway/Respiratory: Uneventful            Sign Out: Acceptable/Baseline resp. status   CV/Hemodynamics: Uneventful            Sign Out: Acceptable CV status; No obvious hypovolemia; No obvious fluid overload   Other NRE: NONE   DID A NON-ROUTINE EVENT OCCUR? No           Last vitals:  Vitals Value Taken Time   /68 01/23/23 1440   Temp 36.7  C (98  F) 01/23/23 1440   Pulse 100 01/23/23 1440   Resp 14 01/23/23 1440   SpO2 100 % 01/23/23 1443       Electronically Signed By: Osman Bautista MD  January 23, 2023  3:49 PM

## 2023-01-23 NOTE — ANESTHESIA PROCEDURE NOTES
Airway       Patient location during procedure: OR       Procedure Start/Stop Times: 1/23/2023 1:46 PM  Staff -        CRNA: Deysi Schwartz APRN CRNA       Performed By: CRNAIndications and Patient Condition       Indications for airway management: jalen-procedural       Induction type:intravenous       Mask difficulty assessment: 1 - vent by mask    Final Airway Details       Final airway type: endotracheal airway       Successful airway: ETT - single  Endotracheal Airway Details        ETT size (mm): 8.0       Cuffed: yes       Successful intubation technique: video laryngoscopy       VL Blade Size: Glidescope 3       Grade View of Cords: 1       Adjucts: stylet       Position: Right       Measured from: lips       Secured at (cm): 23       Bite block used: None    Post intubation assessment        Placement verified by: capnometry and chest rise        Number of attempts at approach: 1       Number of other approaches attempted: 0       Secured with: silk tape       Ease of procedure: easy       Dentition: Intact    Medication(s) Administered   Medication Administration Time: 1/23/2023 1:46 PM

## 2023-01-23 NOTE — PROGRESS NOTES
01/23/23 0825   Appointment Info   Signing Clinician's Name / Credentials (PT) Ludmila Mejia PT   Living Environment   People in Home parent(s)   Current Living Arrangements house   Home Accessibility stairs within home   Number of Stairs, Within Home, Primary greater than 10 stairs  (split entry 6+6)   Stair Railings, Within Home, Primary railing on right side (ascending)   Self-Care   Usual Activity Tolerance good   Current Activity Tolerance moderate   Equipment Currently Used at Home none   Activity/Exercise/Self-Care Comment Pt and family report pt independent with all mobility without AD at baseline.   General Information   Onset of Illness/Injury or Date of Surgery 01/21/23   Referring Physician Dr Saeid Gonzalez   Patient/Family Therapy Goals Statement (PT) Get stronger   Pertinent History of Current Problem (include personal factors and/or comorbidities that impact the POC) Admit for hepatic encephalopathy. PMH includes Autism, anxiety/depression, ETOH cirrhosis   Existing Precautions/Restrictions no known precautions/restrictions   Pain Assessment   Patient Currently in Pain No   Bed Mobility   Comment, (Bed Mobility) Supine>sit independent.   Transfers   Impairments Contributing to Impaired Transfers decreased strength   Comment, (Transfers) Sit<>stand CGA.   Gait/Stairs (Locomotion)   [place] Level (Gait) contact guard   Assistive Device (Gait) other (see comments)  (none)   Distance in Feet 50'x1   Pattern (Gait) step-through   Deviations/Abnormal Patterns (Gait) jazmyn decreased;gait speed decreased;stride length decreased   Clinical Impression   Criteria for Skilled Therapeutic Intervention Yes, treatment indicated   PT Diagnosis (PT) Impaired functional mobility   Influenced by the following impairments weakness, fatigue, balance   Functional limitations due to impairments transfers, gait, stairs.   Clinical Presentation (PT Evaluation Complexity) Evolving/Changing   Clinical Presentation  Rationale Presents as medically diagnosed.   Clinical Decision Making (Complexity) moderate complexity   Planned Therapy Interventions (PT) gait training;patient/family education;home exercise program;stair training;strengthening;transfer training;balance training   Anticipated Equipment Needs at Discharge (PT) other (see comments)  (none)   Risk & Benefits of therapy have been explained evaluation/treatment results reviewed;care plan/treatment goals reviewed;risks/benefits reviewed;participants voiced agreement with care plan;participants included;patient;spouse/significant other   PT Discharge Planning   PT Plan transfers, gait, stairs, balance   PT Discharge Recommendation (DC Rec) (S)  home with assist;home with home care physical therapy   PT Rationale for DC Rec Assist from parents as needed, home PT for increased strength, activity tolerance and functional mobility   PT Brief overview of current status Gait without ' CGA, up/down 3 steps w/B rails CGA, sit/stand CGA.

## 2023-01-23 NOTE — ANESTHESIA CARE TRANSFER NOTE
Patient: Dillon Salter    Procedure: Procedure(s):  ESOPHAGOGASTRODUODENOSCOPY (EGD) with esophageal variceal banding       Diagnosis: Hematemesis, unspecified whether nausea present [K92.0]  Acute posthemorrhagic anemia [D62]  Alcoholic cirrhosis of liver with ascites (H) [K70.31]  Diagnosis Additional Information: No value filed.    Anesthesia Type:   General     Note:    Oropharynx: oropharynx clear of all foreign objects  Level of Consciousness: drowsy  Oxygen Supplementation: room air  Level of Supplemental Oxygen (L/min / FiO2): 8  Independent Airway: airway patency satisfactory and stable  Dentition: dentition unchanged  Vital Signs Stable: post-procedure vital signs reviewed and stable  Report to RN Given: handoff report given  Patient transferred to: PACU    Handoff Report: Identifed the Patient, Identified the Reponsible Provider, Reviewed the pertinent medical history, Discussed the surgical course, Reviewed Intra-OP anesthesia mangement and issues during anesthesia, Set expectations for post-procedure period and Allowed opportunity for questions and acknowledgement of understanding      Vitals:  Vitals Value Taken Time   /69 01/23/23 1430   Temp     Pulse 99 01/23/23 1430   Resp 16 01/23/23 1430   SpO2 100 % 01/23/23 1430   Vitals shown include unvalidated device data.    Electronically Signed By: VAMSHI Blake CRNA  January 23, 2023  2:32 PM

## 2023-01-23 NOTE — PROGRESS NOTES
MD paged regarding 2x hematemsis. 35ml with first emesis and 10ml with second emesis.  Bright red blood with clots.      Per MD, consult GI.

## 2023-01-23 NOTE — PROGRESS NOTES
Pre-procedure Note    Reason for procedure: Hematemesis, altered mental state, cirrhosis    History and Physical Reviewed: Reviewed, no changes.    Pre-sedation assessment:    General: Lethargic, eyes open to stimuli  Airway: normal  Heart: Tachy  Lungs: clear to auscultation bilaterally    Sedation Plan based on assessment: General    Mallampati score: Unable to assess          ASA Classification: ASA 4 - Patient with severe systemic disease that is a constant threat to life    Impression: Patient deemed adequate candidate for general anesthesia     Risks, benefits and alternatives were discussed with the patient's mother (emergency contact) and informed consent was obtained over the phone.    Plan: esophagogastroduodenoscopy      Juan Boo MD 1/23/2023 1:29 PM                                               Juan Boo MD  Thank you for the opportunity to participate in the care of this patient.   Please feel free to call me with any questions or concerns.  Phone number [phone].

## 2023-01-23 NOTE — PLAN OF CARE
Problem: Fall Injury Risk  Goal: Absence of Fall and Fall-Related Injury  Outcome: Progressing  Intervention: Identify and Manage Contributors  Recent Flowsheet Documentation  Taken 1/22/2023 1645 by Swati Mullins RN  Medication Review/Management: medications reviewed  Intervention: Promote Injury-Free Environment  Recent Flowsheet Documentation  Taken 1/22/2023 1645 by Swati Mullins, RN  Safety Promotion/Fall Prevention:   activity supervised   bed alarm on   lighting adjusted   room door open   room near nurse's station   room organization consistent   safety round/check completed   toileting scheduled   supervised activity     Problem: Thought Process Alteration  Goal: Optimal Thought Clarity  Outcome: Progressing  Intervention: Minimize Safety Risk and Altered Thought  Recent Flowsheet Documentation  Taken 1/22/2023 1645 by Swati Mullins RN  Enhanced Safety Measures: room near unit station     Problem: Bowel Elimination Management  Goal: Effective Bowel Elimination/Continence  Outcome: Progressing   Goal Outcome Evaluation: Patient very pleasant. Denies pain or discomfort. Noted to have bleeding gums and bloody mucosa. Patient's mother states this is normal for him, given his pre-existing condition. Received 4 unit of insulin (2-sliding scale, 2 carb count correction). Supportive family at bedside assisting patient with personal cares. Informed family to use the call light for assistance at any time.

## 2023-01-23 NOTE — ANESTHESIA PREPROCEDURE EVALUATION
Anesthesia Pre-Procedure Evaluation    Patient: Dillon Salter   MRN: [id number] : 1995        Procedure : Procedure(s):  ESOPHAGOGASTRODUODENOSCOPY (EGD)          Past Medical History:   Diagnosis Date     Acute on chronic anemia 2022     Alcohol use disorder      Alcoholic cirrhosis of liver with ascites (H)      Alcoholic hepatitis      Autism spectrum      Autism spectrum disorder 2022    High functioning autistic.  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 Main OR     ESOPHAGOSCOPY, GASTROSCOPY, DUODENOSCOPY (EGD), COMBINED N/A 2022    Procedure: ESOPHAGOGASTRODUODENOSCOPY;  Surgeon: Gavino Reza MD;  Location: [facility] Main OR     MIDLINE DOUBLE LUMEN PLACEMENT  2022          PICC TRIPLE LUMEN PLACEMENT  2022           No Known Allergies   Social History     Tobacco Use     Smoking status: Former     Smokeless tobacco: Never     Tobacco comments:     A pack lasted a year, hardly smoked   Substance Use Topics     Alcohol use: Not Currently     Comment: No ETOH since 22      Wt Readings from Last 1 Encounters:   23 66.3 kg (146 lb 3.2 oz)        Anesthesia Evaluation   Pt has had prior anesthetic.     No history of anesthetic complications       ROS/MED HX  ENT/Pulmonary:  - neg pulmonary ROS     Neurologic:  Comment: Hepatic encephalopathy  Autism spectrum disorder      Cardiovascular:     (+) hypertension-----    METS/Exercise Tolerance:     Hematologic:     (+) anemia,     Musculoskeletal:       GI/Hepatic: Comment: hematemesis    (+) liver disease (alcoholic cirrhosis),     Renal/Genitourinary:       Endo:     (+) type II DM,     Psychiatric/Substance Use:       Infectious Disease:       Malignancy:       Other:            Physical Exam    Airway   unable to assess   Comment: Pt not cooperating with exam         Respiratory Devices and Support         Dental     Comment: Poor teeth per pt's mom        Cardiovascular   cardiovascular exam normal          Pulmonary   pulmonary exam normal                OUTSIDE LABS:  CBC:   Lab Results   Component Value Date    WBC 12.8 (H) 01/23/2023    WBC 11.8 (H) 01/22/2023    HGB 8.2 (L) 01/23/2023    HGB 8.7 (L) 01/22/2023    HCT 22.8 (L) 01/23/2023    HCT 24.6 (L) 01/22/2023     (L) 01/23/2023     (L) 01/22/2023     BMP:   Lab Results   Component Value Date     (L) 01/22/2023     (L) 01/21/2023    POTASSIUM 4.1 01/22/2023    POTASSIUM 5.0 01/21/2023    CHLORIDE 95 (L) 01/22/2023    CHLORIDE 97 (L) 01/21/2023    CO2 24 01/22/2023    CO2 27 01/21/2023    BUN 28 (H) 01/22/2023    BUN 19 01/21/2023    CR 0.97 01/22/2023    CR 0.77 01/21/2023     (H) 01/23/2023     (H) 01/23/2023     COAGS:   Lab Results   Component Value Date    PTT 42 (H) 08/14/2022    INR 1.54 (H) 01/21/2023    FIBR 228 08/14/2022     POC: No results found for: BGM, HCG, HCGS  HEPATIC:   Lab Results   Component Value Date    ALBUMIN 3.3 (L) 01/21/2023    PROTTOTAL 7.8 01/21/2023    ALT 39 01/21/2023    AST 74 (H) 01/21/2023    ALKPHOS 132 (H) 01/21/2023    BILITOTAL 11.9 (H) 01/21/2023    DESI 77 (H) 01/21/2023     OTHER:   Lab Results   Component Value Date    LACT 1.8 08/15/2022    A1C 5.4 07/28/2022    SARTHAK 9.4 01/22/2023    PHOS 5.3 (H) 08/14/2022    MAG 1.8 01/21/2023     LIPASE 50 08/14/2022    TSH 1.09 07/30/2022       Anesthesia Plan    ASA Status:  4   NPO Status:  NPO Appropriate    Anesthesia Type: General.     - Airway: ETT   Induction: Intravenous, RSI.      Techniques and Equipment:     - Airway: Video-Laryngoscope         Consents    Anesthesia Plan(s) and associated risks, benefits, and realistic alternatives discussed. Questions answered and patient/representative(s) expressed understanding.     - Discussed: Risks, Benefits and Alternatives for the PROCEDURE were discussed     - Discussed with:  Parent (Mother and/or Father) (spoke with patient's mom via phone)         Postoperative Care    Pain management: IV analgesics, Oral pain medications.   PONV prophylaxis: Ondansetron (or other 5HT-3), Dexamethasone or Solumedrol     Comments:                Arie Cohn MD

## 2023-01-23 NOTE — PLAN OF CARE
Problem: Plan of Care - These are the overarching goals to be used throughout the patient stay.    Goal: Optimal Comfort and Wellbeing  Outcome: Progressing    A&Ox4. Denies pain. Pt has bleeding gums and mucosa, mother states this is chronic but has increased lately. Bedtime lactulose dose held due to 3x stools on 1/22. Independent in room. Pt showered at bedtime. Mother at bedside overnight.

## 2023-01-23 NOTE — CONSULTS
"[facility] Consultation     Dillon Salter  [address] [address]  27 year old male     Admission Date/Time: 1/21/2023  Primary Care Provider: Gary Rodrigues  Referring / Attending Physician:  Lisa     We were asked to see the patient in consultation by Dr. Gonzalez for evaluation of \"New hematemesis on 1/23/23. Alcoholic cirrhotic patient followed by [facility] hepatology admitted for hepatic encephalopathy after his home lactulose was completed discontinued for weeks and all meds stopped for several days.\"       CC: hematemesis     HPI:  Dillon Salter is a 27 year old male with PMH Asperger's, anxiety/depression, and alcoholic cirrhosis diagnosed 4/2022 with history of esophageal varices banded 5/2022, sober since 4/6/22, hepatic encephalopathy, thrombocytopenia undergoing liver transplant evaluation at the [facility] admitted 1/21 for hepatic encephalopathy weeks after he stopped lactulose who we are consulted on given hematemesis this AM. Patient's mother reports he was doing very well with adequate bowel movements so she stopped lactulose weeks ago without any subsequent confusion. Last week he started intensive alcohol treatment program recommended by the transplant team on 1/16 and by 1/19 he was so exhausted he slept for 30 hours. The next day she brought him in versus trying to give lactulose at home. His mother states he was much more alert and confusion improved yesterday but today he is worse again. He started to have bleeding from his gums, which he had months ago and then had melenic stool late last night and again this morning and has had 5 episodes of hematemesis with red blood and clots coming up and out of his nose today. He denies abdominal pain or distension.     ROS: A comprehensive ten point review of systems was negative aside from those in mentioned in the HPI.      PAST MEDICAL HISTORY:  Patient Active Problem List    Diagnosis Date Noted     Thrombocytopenia (H) " 01/21/2023     Priority: Medium     Anemia, unspecified type 01/21/2023     Priority: Medium     Hyperglycemia 07/28/2022     Priority: Medium     SIRS (systemic inflammatory response syndrome) (H) 07/28/2022     Priority: Medium     Alcohol use disorder, severe, dependence (H) 07/11/2022     Priority: Medium     Hepatic encephalopathy 06/18/2022     Priority: Medium     Anemia in other chronic diseases classified elsewhere 06/18/2022     Priority: Medium     Acute posthemorrhagic anemia 06/17/2022     Priority: Medium     Anxiety and depression 05/09/2022     Priority: Medium     Balanitis 05/09/2022     Priority: Medium     Autism spectrum disorder 04/08/2022     Priority: Medium     High functioning autistic.   26-year-old history of autism/Asperger's on the spectrum graduated high school at [employer] worked at [employer] and then another  place until the Covid epidemic in 2020 lives with parents (Leticia and Wes) socially isolated drinks alcohol beer daily         Benign essential hypertension 04/08/2022     Priority: Medium     Alcoholic cirrhosis of liver with ascites (H) 04/06/2022     Priority: Medium     Diabetes mellitus type 2 in obese (H) 02/07/2012     Priority: Medium     SOCIAL HISTORY:  Social History     Tobacco Use     Smoking status: Former     Smokeless tobacco: Never     Tobacco comments:     A pack lasted a year, hardly smoked   Vaping Use     Vaping Use: Former   Substance Use Topics     Alcohol use: Not Currently     Comment: No ETOH since 4/6/22     Drug use: Not Currently     Types: Marijuana   No etoh since 4/2022, no tobacco    FAMILY HISTORY:  Family History   Problem Relation Age of Onset     Hypertension Mother      Substance Abuse Mother      Thyroid Disease Mother      Heart Failure Father      Substance Abuse Father      Chronic Obstructive Pulmonary Disease Father      Substance Abuse Maternal Grandfather    No pertinent family history.     ALLERGIES: No Known  Allergies  MEDICATIONS:   Current Facility-Administered Medications   Medication     acetaminophen (TYLENOL) tablet 650 mg    Or     acetaminophen (TYLENOL) Suppository 650 mg     acetaminophen (TYLENOL) tablet 500 mg     bisacodyl (DULCOLAX) suppository 10 mg     bumetanide (BUMEX) tablet 1 mg     cefTRIAXone (ROCEPHIN) 1 g vial to attach to  mL bag for ADULTS or NS 50 mL bag for PEDS     chlorhexidine (PERIDEX) 0.12 % solution 15 mL     glucose gel 15-30 g    Or     dextrose 50 % injection 25-50 mL    Or     glucagon injection 1 mg     FLUoxetine (PROzac) capsule 60 mg     folic acid (FOLVITE) tablet 1 mg     gabapentin (NEURONTIN) capsule 100 mg     ibuprofen (ADVIL/MOTRIN) tablet 400 mg     insulin aspart (NovoLOG) injection (RAPID ACTING)     insulin aspart (NovoLOG) injection (RAPID ACTING)     insulin glargine (LANTUS PEN) injection 16 Units     lactulose (CHRONULAC) solution 20 g     lidocaine (LMX4) cream     lidocaine 1 % 0.1-1 mL     melatonin tablet 1 mg     multivitamin, therapeutic (THERA-VIT) tablet 1 tablet     ondansetron (ZOFRAN ODT) ODT tab 4 mg    Or     ondansetron (ZOFRAN) injection 4 mg     pantoprazole (PROTONIX) EC tablet 40 mg     polyethylene glycol (MIRALAX) Packet 17 g     rifaximin (XIFAXAN) tablet 550 mg     senna-docusate (SENOKOT-S/PERICOLACE) 8.6-50 MG per tablet 1 tablet    Or     senna-docusate (SENOKOT-S/PERICOLACE) 8.6-50 MG per tablet 2 tablet     sodium chloride (PF) 0.9% PF flush 3 mL     sodium chloride (PF) 0.9% PF flush 3 mL     [Held by provider] spironolactone (ALDACTONE) tablet 100 mg     thiamine (B-1) tablet 100 mg     PHYSICAL EXAM:   /69 (BP Location: Left arm)   Pulse 102   Temp 98.1  F (36.7  C) (Oral)   Resp 16   Ht 1.524 m (5')   Wt 66.3 kg (146 lb 3.2 oz)   SpO2 98%   BMI 28.55 kg/m     GEN: NAD, chronically ill-appearing male lying in bed  HEENT: + icterus, no lymphadenopathy, dried blood around mouth and in nose  HRT: RRR  LUNGS: CTA  ABD:  +BS, soft, nontender  SKIN:+ jaundice, no rash  MSKL: no LE edema, strength 5/5 all 4 extrems  NEURO: sleepy, does not respond to questions     ADDITIONAL DATA:   I reviewed the patient's new clinical lab test results.   Recent Labs   Lab Test 01/23/23  0856 01/22/23  1002 01/21/23 2050 01/21/23  0754 12/20/22  0703 09/22/22  0933 09/15/22  0924   WBC 12.8* 11.8* 8.5 8.8 9.8   < > 15.7*   HGB 8.2* 8.7* 8.6* 8.7* 9.3*   < > 5.7*   * 104* 102* 105* 105*   < > 106*   * 142* 127* 129* 123*   < > 119*   INR  --   --   --  1.54* 1.44*  --  1.73*    < > = values in this interval not displayed.     Recent Labs   Lab Test 01/22/23 1002 01/21/23  0754 12/20/22  0703   POTASSIUM 4.1 5.0 4.6   CHLORIDE 95* 97* 97*   CO2 24 27 29   BUN 28* 19 30.7*   ANIONGAP 13 8 9     Recent Labs   Lab Test 01/21/23  0754 12/20/22  0703 12/20/22  0658 11/28/22  1119 08/15/22  0448 08/14/22  1905 08/14/22  1709 07/28/22  1612 07/28/22  1502 06/18/22  1036 06/18/22  1035   ALBUMIN 3.3* 3.9  --  3.2*   < >  --  3.2*  --  3.4*   < > 3.1*   BILITOTAL 11.9* 9.7*  --  9.4*   < >  --  21.4*  --  24.5*   < > 15.1*   ALT 39 39  --  45   < >  --  51*  --  59*   < > 47*   AST 74* 80*  --  78*   < >  --  120*  --  86*   < > 119*   PROTEIN  --   --  Negative  --   --  Negative  --  Negative  --    < >  --    LIPASE  --   --   --   --   --   --  50  --  35  --  81*    < > = values in this interval not displayed.        Imaging results:  CT head 1/21/23:  IMPRESSION:  1.  No finding for intracranial hemorrhage, mass, or acute infarct.    Procedure results:  EGD 6/20/22 ([facility]):  Findings:        The esophagus showed small varices that flatten with insufflation, and        three healing ulcerations from previous banding. No masses or        obstruction.        Mild portal hypertensive gastropathy was found in the entire examined        stomach. The mucosa was friable, oozing with scope contact.        The examined duodenum was normal.                                                                                     Impression:            - Portal hypertensive gastropathy.                          - Grade I varices with healing banding ulcers.   Recommendation:        - [facility]                          discharge same day.                          - Low sodium diet.                          - Continue current medications.                          - Will discuss discharge with primary team, and g/u                          with Hepatology as outpatient.          ASSESSMENT:    Alcoholic cirrhosis  Secondary esophageal varices  Hepatic encephalopathy  Hematemesis  This is a 26 y/o male with PMH Asperger's, anxiety/depression, and alcoholic cirrhosis diagnosed 4/2022 with history of esophageal varices banded 5/2022, sober since 4/6/22, hepatic encephalopathy, thrombocytopenia undergoing liver transplant evaluation at the [facility] admitted 1/21 for hepatic encephalopathy weeks after he stopped lactulose who we are consulted on given hematemesis this AM. He was doing well without lactulose for several weeks but then developed fatigue/lethargy and was brought in for presumed HE. He improved yesterday but is worse again today. He has had two melenic stools and multiple episodes of hematemesis. Hemoglobin this AM stable but suspect this will drop. He will have EGD today to evaluate for variceal bleed with possible banding or other cause such as Roz-Cano tear, PUD, Dieulafoy lesion, or AVM. He     PLAN:  - NPO  - EGD today  - Continue IV ceftriaxone  - IV PPI and octreotide started  - Hemoglobin monitoring per medicine  - Continue lactulose and Xifaxan 550 mg twice daily         Cece Heaton PA-C  [facility]  1/23/2023 11:33 AM  [phone] (office)    This case was discussed with Dr. Boo who agrees to the above assessment and plan.    Approximately 35 minutes of total time was spent  providing patient care, including patient evaluation, reviewing documentation/test result, and .   ________________________________________________________________________          GI staff addendum  DOS 1/23/2023    28 yo EtOH cirrhosis, complicated by HE and esophageal varices. Following [facility] liver transplant clinic. Autism. Undergoing chem dep counseling. No EtOH for ~9 M. Worsening encephalopathy. Passing 4 BM daily. Held lactulose for period of time. On rifaximin. Significant gingival bleeding. Nausea with hematemesis, large amount of red blood and clots. Previous EGD with EBL 5/2022, f/u with improved varices 6/2022. Hx obtained from mother.    BP (!) 143/75   Pulse 102   Temp 98  F (36.7  C) (Temporal)   Resp 16   Ht 1.524 m (5')   Wt 66.3 kg (146 lb 3.2 oz)   SpO2 100%   BMI 28.55 kg/m    Gen: Eyes open to command. Unable to answer questions appropriately. ENT: Blood in mouth. Heart, tachy. Lungs: CTA anteriorly. Abdom, soft, NTTP, non-distended. Extrem, no edema.    A/P  -Hematemesis - gingival bleeding, vs UGIB (varices, MW tear, esophagitis, portal HTN gastropathy)  -Hepatic encephalopathy - Lactulose held 2/2 excessive BM (4x daily.) On rifaximin. ?other cause, active bleeding, infection possible  -Cirrhosis  -Autism    -NPO  -IV PPI, octreotide, antibx  -EGD today.  -Check PEth level.  -IVF  -Blood cx. UCx  -Stool Cdiff  -Diagnostic paracentesis  -Continue lactulose and rifaximin.  Discussed with pts mother, Leticia. Agreeable to proceed.    20 minutes of total time was spent providing patient care, including patient evaluation, reviewing documentation/test result, and .    Juan Boo MD on 1/23/2023 at 1:41 PM

## 2023-01-23 NOTE — PROGRESS NOTES
[facility]    Medicine Progress Note - Hospitalist Service    Date of Admission:  1/21/2023    Assessment & Plan      Dillon Salter is a 27 year old male admitted on 1/21/2023. He has a history of alcoholic cirrhosis diagnosed April 2022, esophageal varices s/p banding, hepatic encephalopathy, thrombocytopenia, Autism spectrum disorder, anxiety/depression, HTN, and multifactorial chronic anemia presents with acute metabolic encephalopathy likely from non-adherence to home medications including lactulose. Lactulose was stopped completely several weeks ago and all medications were not administered at home for 48-hours PTA. At admission, restarted lactulose and titrating o clinical improvement, IV ceftriaxone given variceal bleeding history and decompensated cirrhosis, and diagnostic paracentesis if able to draw fluid.     Clinically mentation clearing. BMP on 1/22/23 shows resolved mild hyperkalemia, so will unhold and restart home spironolactone. IR to perform US-guided diagnostic paracentesis; if sample is sterile, may still consider a 5-day treatment course as he has improved on treatment and has been admitted on IV ceftriaxone (as he was meeting SIRS criteria), benefits likely outweigh risks of antibiotic treatment. PT and OT evals requested. His mother updated and corroborates that his mentation has improved but not yet baseline. Additionally, he only had 3 BMs in past 24-hr as opposed to 4 BMs and is slightly more confused today - thus, lactulose dose increased. Moreover, he has new several episodes of hematemesis for which GI has been consulted.      Metabolic Encephalopathy - likely Hepatic Encephalopathy  Non-Adherence / Medication Non-Compliance  Alcoholic Cirrhosis - sober since 4/2022, on hepatology care team at [facility] with Dr. Wood  -Current presentation most likely from medication non-adherence. Lactulose was stopped several weeks ago (estimated 4-6 weeks) and all home prescribed  medications not given over the past 2-days.   -His mother is his caretaker and education provided on reason/rationale for his prescribed medications, of particularly import the lactulose and rifaximin for HE.   -Order increased lactulose to 20 g TID with hold parameters - may titrate (up or down if loose stools) to goal of at least 3-4 moderate/large BMs.   -Order rifaximin.   -Diagnostic paracentesis with labs and cell count    New Hematemesis - several episodes on 1/23/23  -GI consultation appreciated.   -Holding blood thinning medications  -Bloody gums and mouth - chlorhexidine rinses ordered once and then PRN  -Plan to start IV PPI and octreotide    Hyperkalemia  -Hold home spironolactone  -Follow K, repeat BMP ordered/pending    MDD/Anxiety  -Order home medications.     HTN  -Order home medications and as needed apply specified hold parameters.     ASD  -Order home medications  -Mother is his caretaker  -Education provided to both patient and his mother  -CM/SW       Diet: Combination Diet Regular Diet Adult  Room Service    DVT Prophylaxis: Pneumatic Compression Devices, Anti-embolisim stockings (TEDs), Ambulate every shift and VTE Prophylaxis contraindicated due to upcoming paracentesis today 1/23/23  Negron Catheter: Not present  Lines: None     Cardiac Monitoring: None  Code Status: Full Code      Clinically Significant Risk Factors           # Hypercalcemia: corrected calcium is >10.1, will monitor as appropriate    # Hypoalbuminemia: Lowest albumin = 3.3 g/dL at 1/21/2023  7:54 AM, will monitor as appropriate   # Thrombocytopenia: Lowest platelets = 127 in last 2 days, will monitor for bleeding          # Overweight: Estimated body mass index is 28.55 kg/m  as calculated from the following:    Height as of this encounter: 1.524 m (5').    Weight as of this encounter: 66.3 kg (146 lb 3.2 oz)., PRESENT ON ADMISSION         Disposition Plan             Saeid Gonzalez MD  Hospitalist Service  [facility]  [facility]  Securely message with [employer] (more info)  Text page via [facility] Paging/Directory   ______________________________________________________________________    Interval History   No acute events overnight.    Mentally clearing but still confusing. His mother confirms mental status it not yet to baseline. Bloody gum and mouth. No hemoptysis or hematemesis reported. No report of chest pain, shortness of breath, or other new complaints. Discussed plan of care with patient. Answered all questions to patient and his mother's verbalized understanding and satisfaction.    Update: RN paged that patient has new hematemsis, and several episodes. GI consultation placed for new acute hematemesis (not just bloody gums but actual vomiting up blood and blood clots).     Physical Exam   Vital Signs: Temp: 97.6  F (36.4  C) Temp src: Oral BP: 125/65 Pulse: 105   Resp: 20 SpO2: 98 % O2 Device: None (Room air)    Weight: 146 lbs 3.2 oz  GENERAL:  Alert, appears comfortable, in no acute distress, appears stated age   HEAD:  Normocephalic, without obvious abnormality, atraumatic   EYES:  PERRL, conjunctiva/corneas clear, + bilateral scleral icterus, EOM's intact   NOSE: Nares normal, septum midline, mucosa normal, no drainage   THROAT: Lips, teeth and gums now bloody with dried blood on gums and teeth, mouth moist   NECK: Supple, symmetrical, trachea midline   BACK:   Symmetric, no curvature, ROM normal   LUNGS:   Clear to auscultation bilaterally, no rales, rhonchi, or wheezing, symmetric chest rise on inhalation, respirations unlabored   CHEST WALL:  No tenderness or deformity   HEART:  Regular rate and rhythm, S1 and S2 normal, no murmur, rub, or gallop    ABDOMEN:   Soft, non-tender, bowel sounds active all four quadrants, no masses, no organomegaly, no rebound or guarding   EXTREMITIES: Extremities normal, atraumatic, no cyanosis or edema    SKIN: Jaundiced throughout; Dry to touch, no other exanthems in the  visualized areas   NEURO: Alert, oriented x 3 with some persistent confusion, moves all four extremities freely/spontaneously   PSYCH: Cooperative, behavior is appropriate given context (hepatic encephalopathy), forgetful, repetitive, tangential          Medical Decision Making     70 MINUTES SPENT BY ME on the date of service doing chart review, history, exam, documentation & further activities per the note.      Data     I have personally reviewed the following data over the past 24 hrs:    11.8 (H)  \   8.7 (L)   / 142 (L)     132 (L) 95 (L) 28 (H) /  211 (H)   4.1 24 0.97 \       Imaging results reviewed over the past 24 hrs:   No results found for this or any previous visit (from the past 24 hour(s)).

## 2023-01-24 ENCOUNTER — APPOINTMENT (OUTPATIENT)
Dept: ULTRASOUND IMAGING | Facility: CLINIC | Age: 28
DRG: 441 | End: 2023-01-24
Attending: HOSPITALIST
Payer: COMMERCIAL

## 2023-01-24 ENCOUNTER — APPOINTMENT (OUTPATIENT)
Dept: OCCUPATIONAL THERAPY | Facility: CLINIC | Age: 28
DRG: 441 | End: 2023-01-24
Attending: HOSPITALIST
Payer: COMMERCIAL

## 2023-01-24 LAB
ALBUMIN SERPL-MCNC: 2.9 G/DL (ref 3.5–5)
ALP SERPL-CCNC: 102 U/L (ref 45–120)
ALT SERPL W P-5'-P-CCNC: 30 U/L (ref 0–45)
ANION GAP SERPL CALCULATED.3IONS-SCNC: 12 MMOL/L (ref 5–18)
APTT PPP: 46 SECONDS (ref 22–38)
AST SERPL W P-5'-P-CCNC: 60 U/L (ref 0–40)
BILIRUB DIRECT SERPL-MCNC: 2.4 MG/DL
BILIRUB SERPL-MCNC: 11 MG/DL (ref 0–1)
BLD PROD TYP BPU: NORMAL
BLD PROD TYP BPU: NORMAL
BLOOD COMPONENT TYPE: NORMAL
BLOOD COMPONENT TYPE: NORMAL
BUN SERPL-MCNC: 32 MG/DL (ref 8–22)
CALCIUM SERPL-MCNC: 8.8 MG/DL (ref 8.5–10.5)
CHLORIDE BLD-SCNC: 96 MMOL/L (ref 98–107)
CO2 SERPL-SCNC: 24 MMOL/L (ref 22–31)
CODING SYSTEM: NORMAL
CODING SYSTEM: NORMAL
CREAT SERPL-MCNC: 0.89 MG/DL (ref 0.7–1.3)
CROSSMATCH: NORMAL
CROSSMATCH: NORMAL
ERYTHROCYTE [DISTWIDTH] IN BLOOD BY AUTOMATED COUNT: 13.7 % (ref 10–15)
GFR SERPL CREATININE-BSD FRML MDRD: >90 ML/MIN/1.73M2
GLUCOSE BLD-MCNC: 150 MG/DL (ref 70–125)
GLUCOSE BLDC GLUCOMTR-MCNC: 241 MG/DL (ref 70–99)
GLUCOSE BLDC GLUCOMTR-MCNC: 383 MG/DL (ref 70–99)
GLUCOSE BLDC GLUCOMTR-MCNC: 468 MG/DL (ref 70–99)
GLUCOSE BLDC GLUCOMTR-MCNC: 82 MG/DL (ref 70–99)
HCT VFR BLD AUTO: 17.9 % (ref 40–53)
HGB BLD-MCNC: 6.2 G/DL (ref 13.3–17.7)
HGB BLD-MCNC: 8.3 G/DL (ref 13.3–17.7)
HOLD SPECIMEN: NORMAL
HOLD SPECIMEN: NORMAL
INR PPP: 2 (ref 0.85–1.15)
ISSUE DATE AND TIME: NORMAL
ISSUE DATE AND TIME: NORMAL
LACTATE SERPL-SCNC: 1.2 MMOL/L (ref 0.7–2)
MAGNESIUM SERPL-MCNC: 1.6 MG/DL (ref 1.8–2.6)
MCH RBC QN AUTO: 36.9 PG (ref 26.5–33)
MCHC RBC AUTO-ENTMCNC: 34.6 G/DL (ref 31.5–36.5)
MCV RBC AUTO: 107 FL (ref 78–100)
PLATELET # BLD AUTO: 142 10E3/UL (ref 150–450)
POTASSIUM BLD-SCNC: 4.9 MMOL/L (ref 3.5–5)
PROT SERPL-MCNC: 6.4 G/DL (ref 6–8)
RBC # BLD AUTO: 1.68 10E6/UL (ref 4.4–5.9)
SODIUM SERPL-SCNC: 132 MMOL/L (ref 136–145)
UNIT ABO/RH: NORMAL
UNIT ABO/RH: NORMAL
UNIT NUMBER: NORMAL
UNIT NUMBER: NORMAL
UNIT STATUS: NORMAL
UNIT STATUS: NORMAL
UNIT TYPE ISBT: 600
UNIT TYPE ISBT: 600
WBC # BLD AUTO: 15 10E3/UL (ref 4–11)

## 2023-01-24 PROCEDURE — 80053 COMPREHEN METABOLIC PANEL: CPT | Performed by: EMERGENCY MEDICINE

## 2023-01-24 PROCEDURE — 250N000011 HC RX IP 250 OP 636: Performed by: PHYSICIAN ASSISTANT

## 2023-01-24 PROCEDURE — 258N000003 HC RX IP 258 OP 636: Performed by: PHYSICIAN ASSISTANT

## 2023-01-24 PROCEDURE — 36415 COLL VENOUS BLD VENIPUNCTURE: CPT | Performed by: EMERGENCY MEDICINE

## 2023-01-24 PROCEDURE — 120N000001 HC R&B MED SURG/OB

## 2023-01-24 PROCEDURE — 82248 BILIRUBIN DIRECT: CPT | Performed by: PHYSICIAN ASSISTANT

## 2023-01-24 PROCEDURE — 85027 COMPLETE CBC AUTOMATED: CPT | Performed by: EMERGENCY MEDICINE

## 2023-01-24 PROCEDURE — 97110 THERAPEUTIC EXERCISES: CPT | Mod: GO

## 2023-01-24 PROCEDURE — 36415 COLL VENOUS BLD VENIPUNCTURE: CPT | Performed by: HOSPITALIST

## 2023-01-24 PROCEDURE — 250N000011 HC RX IP 250 OP 636: Performed by: INTERNAL MEDICINE

## 2023-01-24 PROCEDURE — 97166 OT EVAL MOD COMPLEX 45 MIN: CPT | Mod: GO

## 2023-01-24 PROCEDURE — 85730 THROMBOPLASTIN TIME PARTIAL: CPT | Performed by: EMERGENCY MEDICINE

## 2023-01-24 PROCEDURE — 250N000011 HC RX IP 250 OP 636: Performed by: HOSPITALIST

## 2023-01-24 PROCEDURE — 258N000003 HC RX IP 258 OP 636: Performed by: INTERNAL MEDICINE

## 2023-01-24 PROCEDURE — C9113 INJ PANTOPRAZOLE SODIUM, VIA: HCPCS | Performed by: PHYSICIAN ASSISTANT

## 2023-01-24 PROCEDURE — 99232 SBSQ HOSP IP/OBS MODERATE 35: CPT | Performed by: EMERGENCY MEDICINE

## 2023-01-24 PROCEDURE — P9016 RBC LEUKOCYTES REDUCED: HCPCS | Performed by: EMERGENCY MEDICINE

## 2023-01-24 PROCEDURE — 85610 PROTHROMBIN TIME: CPT | Performed by: PHYSICIAN ASSISTANT

## 2023-01-24 PROCEDURE — 85018 HEMOGLOBIN: CPT | Performed by: EMERGENCY MEDICINE

## 2023-01-24 PROCEDURE — 83735 ASSAY OF MAGNESIUM: CPT | Performed by: EMERGENCY MEDICINE

## 2023-01-24 PROCEDURE — 97535 SELF CARE MNGMENT TRAINING: CPT | Mod: GO

## 2023-01-24 PROCEDURE — 83605 ASSAY OF LACTIC ACID: CPT | Performed by: HOSPITALIST

## 2023-01-24 PROCEDURE — 250N000013 HC RX MED GY IP 250 OP 250 PS 637: Performed by: HOSPITALIST

## 2023-01-24 PROCEDURE — 76705 ECHO EXAM OF ABDOMEN: CPT

## 2023-01-24 RX ADMIN — RIFAXIMIN 550 MG: 550 TABLET ORAL at 09:30

## 2023-01-24 RX ADMIN — RIFAXIMIN 550 MG: 550 TABLET ORAL at 19:50

## 2023-01-24 RX ADMIN — INSULIN GLARGINE 16 UNITS: 100 INJECTION, SOLUTION SUBCUTANEOUS at 19:50

## 2023-01-24 RX ADMIN — FLUOXETINE 60 MG: 20 CAPSULE ORAL at 22:16

## 2023-01-24 RX ADMIN — PANTOPRAZOLE SODIUM 40 MG: 40 INJECTION, POWDER, FOR SOLUTION INTRAVENOUS at 23:59

## 2023-01-24 RX ADMIN — FOLIC ACID 1 MG: 1 TABLET ORAL at 09:30

## 2023-01-24 RX ADMIN — LACTULOSE 20 G: 10 SOLUTION ORAL at 15:19

## 2023-01-24 RX ADMIN — BUMETANIDE 1 MG: 1 TABLET ORAL at 09:30

## 2023-01-24 RX ADMIN — PANTOPRAZOLE SODIUM 40 MG: 40 INJECTION, POWDER, FOR SOLUTION INTRAVENOUS at 13:21

## 2023-01-24 RX ADMIN — PHYTONADIONE 10 MG: 10 INJECTION, EMULSION INTRAMUSCULAR; INTRAVENOUS; SUBCUTANEOUS at 18:56

## 2023-01-24 RX ADMIN — CEFTRIAXONE 1 G: 1 INJECTION, POWDER, FOR SOLUTION INTRAMUSCULAR; INTRAVENOUS at 09:30

## 2023-01-24 RX ADMIN — PANTOPRAZOLE SODIUM 40 MG: 40 INJECTION, POWDER, FOR SOLUTION INTRAVENOUS at 00:20

## 2023-01-24 RX ADMIN — LACTULOSE 20 G: 10 SOLUTION ORAL at 09:30

## 2023-01-24 RX ADMIN — Medication 100 MG: at 09:30

## 2023-01-24 RX ADMIN — INSULIN ASPART 10 UNITS: 100 INJECTION, SOLUTION INTRAVENOUS; SUBCUTANEOUS at 17:18

## 2023-01-24 RX ADMIN — Medication 50 MCG/HR: at 18:56

## 2023-01-24 ASSESSMENT — ACTIVITIES OF DAILY LIVING (ADL)
ADLS_ACUITY_SCORE: 43
ADLS_ACUITY_SCORE: 41
ADLS_ACUITY_SCORE: 43
ADLS_ACUITY_SCORE: 41
ADLS_ACUITY_SCORE: 41

## 2023-01-24 NOTE — PROGRESS NOTES
[facility] MEDICINE PROGRESS NOTE      Summary:  27 year old male on hospital day 4 after being admitted for acute on chronic liver disease caused by alcoholic cirrhosis.  He was diagnosed in 4/2022.  He has recurring sequela  Including esophageal varices, encephalopathy, thrombocytopenia with acute hematemesis  Yesterday.  EGD on 1/21 per the GI team showed esophageal varices requiring banding in addition to portal gastropathy.      Problem list:    1.  Hematemesis: s/p EGD #2 yesterday; continue PPI BID  2.  Metabolic encephalopathy: lactulose 20 grams 3 times daily; rifaximin; need to   Continue with daily dosing; Mom educated again regarding clinical course   Of advanced cirrhosis  3.  Anemia: acute on chronic due to acute blood loss: hgb today is 6.2 (down from   8.2); transfuse 2 units packed red cells today;   4.  Esophageal varices/portal gastropathy: PPI BID, octreotide; clear liquids today;   4.  Ascites: pending paracentesis today  5.  Acute on chronic liver disease: MELD today is 26; case discussed with GI who    Discussed with [facility]; no indication for transfer yet; continue with   6.  Hyperkalemia: improved  7.  ASD: (mother is caretaker)      Delmi Stephenson MD  [facility] Medicine  [facility]  Phone: #[phone]      Interval History/Subjective: restless last night; missed 1 dose lactulose; pt not  Taking clear liquids; does not seem to want it; still with dark stools; hemodynamically  stable    Physical Exam/Objective:  Temp:  [97  F (36.1  C)-98.3  F (36.8  C)] 98.2  F (36.8  C)  Pulse:  [] 110  Resp:  [14-18] 16  BP: (125-149)/(63-81) 132/63  SpO2:  [95 %-100 %] 100 %  Body mass index is 28.55 kg/m .    GENERAL:  Alert, chronically ill; jaundiced; oriented to person and Mom   HEAD:  Normocephalic, without obvious abnormality, atraumatic   EYES:  PERRL, conjunctiva/corneas clear, no scleral icterus, EOM's intact   NOSE: Nares  normal, septum midline, mucosa normal, no drainage   THROAT: Mouth with +++ dried blood; gums are oozing   NECK: Supple, symmetrical, trachea midline   BACK:   Symmetric, no curvature, ROM normal   LUNGS:   Clear to auscultation bilaterally, no rales, rhonchi, or wheezing, symmetric chest rise on inhalation, respirations unlabored   CHEST WALL:  No tenderness or deformity   HEART:  Regular rate and rhythm, S1 and S2 normal, no murmur, rub, or gallop    ABDOMEN:   Soft, mild distention; likely due to ascites; no spider angiomata   EXTREMITIES: Extremities normal, atraumatic, no cyanosis or edema    SKIN: Dry to touch, no exanthems in the visualized areas   NEURO: Alert, oriented x3, moves all four extremities freely   PSYCH: Cooperative, behavior is appropriate      Data reviewed today: I personally reviewed all new medications, labs, imaging/diagnostics reports over the past 24 hours. Pertinent findings include:    Imaging:   No results found for this or any previous visit (from the past 24 hour(s)).    Labs:  Most Recent 3 CBC's:Recent Labs   Lab Test 01/24/23  0809 01/23/23  0856 01/22/23  1002   WBC 15.0* 12.8* 11.8*   HGB 6.2* 8.2* 8.7*   * 105* 104*   * 147* 142*     Most Recent 3 BMP's:Recent Labs   Lab Test 01/24/23  0809 01/24/23  0206 01/23/23 2007 01/22/23  1146 01/22/23  1002 01/21/23  1720 01/21/23  0754   *  --   --   --  132*  --  132*   POTASSIUM 4.9  --   --   --  4.1  --  5.0   CHLORIDE 96*  --   --   --  95*  --  97*   CO2 24  --   --   --  24  --  27   BUN 32*  --   --   --  28*  --  19   CR 0.89  --   --   --  0.97  --  0.77   ANIONGAP 12  --   --   --  13  --  8   SARTHAK 8.8  --   --   --  9.4  --  9.8   * 82 105*   < > 175*   < > 179*    < > = values in this interval not displayed.       Medications:   Personally Reviewed.  Medications     octreotide (sandoSTATIN) infusion ADULT 50 mcg/hr (01/24/23 0023)       bumetanide  1 mg Oral Daily     cefTRIAXone  1 g Intravenous  Q24H     FLUoxetine  60 mg Oral At Bedtime     folic acid  1 mg Oral Daily     insulin aspart  1-10 Units Subcutaneous TID w/meals     insulin aspart   Subcutaneous TID AC     insulin glargine  16 Units Subcutaneous BID     lactulose  20 g Oral TID     multivitamin, therapeutic  1 tablet Oral Daily     pantoprazole  40 mg Intravenous Q12H     rifaximin  550 mg Oral BID     sodium chloride (PF)  3 mL Intracatheter Q8H     [Held by provider] spironolactone  100 mg Oral At Bedtime     thiamine  100 mg Oral Daily

## 2023-01-24 NOTE — PROGRESS NOTES
01/24/23 1406   Appointment Info   Signing Clinician's Name / Credentials (OT) LUIS A Wilson   Living Environment   People in Home parent(s)   Current Living Arrangements house   Transportation Anticipated family or friend will provide   Living Environment Comments Pt has walkin shower, standard toilets. No DME or ADs   Self-Care   Usual Activity Tolerance good   Current Activity Tolerance moderate   Equipment Currently Used at Home none   Fall history within last six months no   Activity/Exercise/Self-Care Comment Pt reports being IND w/ ADLs and gets assistance w/ IADLs from parents   General Information   Onset of Illness/Injury or Date of Surgery 01/21/23   Referring Physician Delmi Stephenson MD   Patient/Family Therapy Goal Statement (OT) get stronger   Additional Occupational Profile Info/Pertinent History of Current Problem hepatic encephalopathy, weakness   Existing Precautions/Restrictions no known precautions/restrictions   Cognitive Status Examination   Orientation Status orientation to person, place and time   Visual Perception   Visual Impairment/Limitations WNL   Sensory   Sensory Quick Adds sensation intact   Pain Assessment   Patient Currently in Pain No   Posture   Posture not impaired   Range of Motion Comprehensive   General Range of Motion bilateral upper extremity ROM WFL   Strength Comprehensive (MMT)   Comment, General Manual Muscle Testing (MMT) Assessment generalized weakness (4-/5)   Muscle Tone Assessment   Muscle Tone Quick Adds No deficits were identified   Coordination   Upper Extremity Coordination No deficits were identified   Bed Mobility   Bed Mobility No deficits identified   Transfers   Transfers shower transfer   Transfer Comments SBA-CGA for transfers   Activities of Daily Living   BADL Assessment/Intervention lower body dressing;bathing   Bathing Assessment/Intervention   [place] Level (Bathing) supervision   Lower Body Dressing Assessment/Training   [place]  Level (Lower Body Dressing) minimum assist (75% patient effort)   Clinical Impression   Criteria for Skilled Therapeutic Interventions Met (OT) Yes, treatment indicated   OT Diagnosis decreased ADLs   Influenced by the following impairments weakness, hepatic encephalopathy   OT Problem List-Impairments impacting ADL activity tolerance impaired;mobility;range of motion (ROM)   Assessment of Occupational Performance 1-3 Performance Deficits   Identified Performance Deficits LE dressing, transfers   Planned Therapy Interventions (OT) ADL retraining;transfer training;strengthening   Clinical Decision Making Complexity (OT) moderate complexity   Risk & Benefits of therapy have been explained evaluation/treatment results reviewed;patient  (aunt)   OT Total Evaluation Time   OT Eval, Moderate Complexity Minutes (51057) 10   OT Goals   Therapy Frequency (OT) Daily   OT Predicted Duration/Target Date for Goal Attainment 01/30/23   OT Goals Lower Body Dressing;Aerobic Activity   OT: Lower Body Dressing Modified independent   OT: Perform aerobic activity with stable cardiovascular response continuous activity;15 minutes  (IND w/ HEP)   Interventions   Interventions Quick Adds Self-Care/Home Management;Therapeutic Procedures/Exercise   Self-Care/Home Management   Self-Care/Home Mgmt/ADL, Compensatory, Meal Prep Minutes (45966) 15   Symptoms Noted During/After Treatment (Meal Preparation/Planning Training) fatigue   Treatment Detail/Skilled Intervention Pt IND w/ bed mobility - STS w/ SBA and amb 15 ft in room w/ SBA and slow/small steps. Pt wanting to change to pullup brief - Doffed old brief and donned new brief w/ Min A while seated EOB. Pt amb. 50 ftx2 in hallway w/ SBA, no LOB. Discussed DMe for home and pt not needing any at this time.   Therapeutic Procedures/Exercise   Therapeutic Procedure: strength, endurance, ROM, flexibillity minutes (95403) 15   Symptoms Noted During/After Treatment fatigue   Treatment  Detail/Skilled Intervention Pt instructed on BUE ex w/ 1lb dowel while seated EOB. Pt completed 1x10 reps of shoulder flex/ext/abd, circumduction, horizontal abd, elbow flex/ext., forward reaches, and various core exercises. Pt needing cueing for breathing, pace, and positioning. Pt fatigued at times but tolerated well overall.   OT Discharge Planning   OT Plan 1/30: UE ex w/ dowel, standing activity tolerance, shower transfer   OT Discharge Recommendation (DC Rec) (S)  home with home care occupational therapy   OT Rationale for DC Rec Pt presents w/ weakness. Pt would benefit from home health OT to increase strength and activity tolerance for ADLs at home. Pt not needing DME at this time.   OT Brief overview of current status SBA for transfers   Total Session Time   Timed Code Treatment Minutes 30   Total Session Time (sum of timed and untimed services) 40

## 2023-01-24 NOTE — PLAN OF CARE
Problem: Plan of Care - These are the overarching goals to be used throughout the patient stay.    Goal: Optimal Comfort and Wellbeing  Outcome: Adequate for Care Transition     Problem: Gastrointestinal Bleeding  Goal: Hemostasis  Outcome: Adequate for Care Transition   Goal Outcome Evaluation:  Pt pleasant and cooperative throughout this shift.  He has difficulty remember his train of thought at times, but able to make needs known.  Incontinent of B&B today.  Multiple episodes of hematemesis and hemoptysis throughout the day, MD aware.  Pt went for EGD this afternoon.  He was unable to get paracentesis today due to EGD.  Since coming back up from his EGD pt has had 1x hemoptysis episode.

## 2023-01-24 NOTE — PLAN OF CARE
Problem: Thought Process Alteration  Goal: Optimal Thought Clarity  Outcome: Progressing  Intervention: Minimize Safety Risk and Altered Though     Problem: Bowel Elimination Management  Goal: Effective Bowel Elimination/Continence  Outcome: Progressing   Pt resting comfortably during shift. Mother Leticia at bedside. Pt very lethargic after procedure on 1/23, and mother declined pt's bedtime medications. Pt has had three loose bowel movements on shift, all dark red/black. Pt had two small episode of hemoptysis. Pt denying pain.

## 2023-01-24 NOTE — PROGRESS NOTES
Care Management Follow Up    Length of Stay (days): 3    Expected Discharge Date: 01/25/2023     Concerns to be Addressed: discharge planning, substance/tobacco abuse/use       Additional Information:  CM following along. Patient is not medically ready to discharge. Patient may transfer to  due to GI bleed, needing further care. Will continue to be avail as needed.      Amparo Vinson RN

## 2023-01-24 NOTE — PROGRESS NOTES
[facility] Progress Note       SUBJECTIVE:  Patient with continued confusion but reportedly more alert today after multiple BMs last night. Still with melena. No abdominal pain.        OBJECTIVE:  /62 (BP Location: Right arm, Patient Position: Supine)   Pulse 110   Temp 98.5  F (36.9  C) (Axillary)   Resp 12   Ht 1.524 m (5')   Wt 66.3 kg (146 lb 3.2 oz)   SpO2 99%   BMI 28.55 kg/m    Temp (24hrs), Av.8  F (36.6  C), Min:97  F (36.1  C), Max:98.5  F (36.9  C)    Patient Vitals for the past 72 hrs:   Weight   23 0039 66.3 kg (146 lb 3.2 oz)       Intake/Output Summary (Last 24 hours) at 2023 1046  Last data filed at 2023 0400  Gross per 24 hour   Intake 820 ml   Output --   Net 820 ml        PHYSICAL EXAM  GEN: NAD, chronically ill-appearing male sitting up in bed   HRT: no LE edema  RESP: unlabored  ABD: mildly distended, soft, nontender  SKIN: + jaundice  NEURO: alert, confused      Additional Data:  I have reviewed the patient's new clinical lab results:     Recent Labs   Lab Test 23  0809 23  0856 23  1002 23  0754 22  0703 22  0933 09/15/22  0924   WBC 15.0* 12.8* 11.8*   < > 8.8 9.8   < > 15.7*   HGB 6.2* 8.2* 8.7*   < > 8.7* 9.3*   < > 5.7*   * 105* 104*   < > 105* 105*   < > 106*   * 147* 142*   < > 129* 123*   < > 119*   INR  --   --   --   --  1.54* 1.44*  --  1.73*    < > = values in this interval not displayed.     Recent Labs   Lab Test 23  0809 23  1002 23  075   POTASSIUM 4.9 4.1 5.0   CHLORIDE 96* 95* 97*   CO2 24 24 27   BUN 32* 28* 19   ANIONGAP 12 13 8     Recent Labs   Lab Test 23  0754 22  0703 22  0658 22  1119 08/15/22  0448 22  1905 22  1709 22  1612 22  1502 22  1036 22  1035   ALBUMIN 3.3* 3.9  --  3.2*   < >  --  3.2*  --  3.4*   < > 3.1*   BILITOTAL 11.9* 9.7*  --  9.4*   < >  --  21.4*  --  24.5*   < >  15.1*   ALT 39 39  --  45   < >  --  51*  --  59*   < > 47*   AST 74* 80*  --  78*   < >  --  120*  --  86*   < > 119*   PROTEIN  --   --  Negative  --   --  Negative  --  Negative  --    < >  --    LIPASE  --   --   --   --   --   --  50  --  35  --  81*    < > = values in this interval not displayed.     MELD Na 1/24/23: 26    Imaging results:  US limited 1/24/23:  FINDINGS: Imaging performed over all 4 quadrants for possible paracentesis. No ascites seen. No paracentesis performed.    CT head 1/21/23:  IMPRESSION:  1.  No finding for intracranial hemorrhage, mass, or acute infarct.    Procedure results:  EGD 1/23/23 (Ema):  Findings:        The nasopharynx and oropharynx are abnormal with red blood. No        definitive active bleeding from the nasopharynx was noted. Gingival        bleeding.        Three columns of grade II varices with stigmata of recent bleeding were        found in the distal esophagus. Red adiel signs were present. Scarring        from prior treatment was visible. Three bands were successfully placed        with complete eradication, resulting in deflation of varices. There was        no bleeding at the end of the procedure.        Moderate-severe, diffuse portal hypertensive gastropathy was found in        the entire examined stomach. Red blood within the stomach. Good        visualization with irrigation. Oozing blood loss from the portal        hypertensive gastropathy. NO ulcer or gastric varices were identified.        The examined duodenum was normal.                                                                                     Impression:     - The nasopharynx and oropharynx are abnormal with red                          blood.                          - Grade II esophageal varices with stigmata of recent                          bleeding. Completely eradicated. Banded.                          - Portal hypertensive gastropathy. Oozing blood loss.                          -  Normal examined duodenum.                          - No specimens collected.   Recommendation:        - Continue IV PPI, octreotide and antibiotics.                          - Serial Hgb. Transfuse as needed.                          - Administer 10 mg IV vitamin K.                          - Clear liquid diet once encephalopathy improves. E       IMPRESSION:  Alcoholic cirrhosis  Secondary esophageal varices  Hepatic encephalopathy  Hematemesis  This is a 26 y/o male with PMH Asperger's, anxiety/depression, and alcoholic cirrhosis diagnosed 4/2022 with history of esophageal varices banded 5/2022, sober since 4/6/22, hepatic encephalopathy, thrombocytopenia undergoing liver transplant evaluation at the [facility] admitted 1/21 for hepatic encephalopathy weeks after he stopped lactulose who we were consulted on 1/23 given hematemesis and melena. EGD 1/23 with esophageal varices with stigmata of recent bleeding, banded but also oozing of portal hypertensive gastropathy and gingival bleeding all contributing. Hemoglobin 6.2 today with continued melena. MELD Na is 26 today. Diagnostic paracentesis ordered but not enough fluid present. Discussed with Hepatologist On-Call at [facility], Dr. Wood, who patient last saw one month ago. He recommended considering RFA of portal hypertensive gastropathy. From outpatient standpoint he has been working on alcohol treatment as recommended. Other recommendations were Endocrine follow up for possible diabetes, improving nutritional status, and PT/OT follow ups. At this time no indication for transfer.     PLAN:  - Clear liquid diet with nutritional supplements between meals, okay for magic cup  - Continue lactulose and rifaximin  - Hemoglobin monitoring and transfusion per medicine  - Continue octreotide, PPI and abx  - Continued outpatient follow up at [facility], alcohol treatment, PT/OT, etc      (Dr. Boo)  Cece Heaton PA-C  [facility]  1/24/2023 10:46    [phone] (office)  ________________________________________________________________________        GI staff addendum  DOS 1/24/2023    Pt much more alert. Conversational. Alert and oriented to time/place. Reports passing 2 BM today, melenic stool. No emesis. Oral bleeding from gums persist. Denies abdominal pain, nausea, emesis. Denies EtOH prior to admission. Intermittent NSAIDs/APAP.    Continue current treatment; IV PPI,octreotide, antibx. Administer another dose IV vit K. Repeat MELD labs in AM. Continue lactulose and rifaximin. Serial Hgb. Transfuse as needed.  If ongoing bleeding despite recent banding and medical rx, will repeat endoscopy.    Juan Boo MD on 1/24/2023 at 5:23 PM

## 2023-01-25 ENCOUNTER — APPOINTMENT (OUTPATIENT)
Dept: CT IMAGING | Facility: CLINIC | Age: 28
DRG: 441 | End: 2023-01-25
Attending: INTERNAL MEDICINE
Payer: COMMERCIAL

## 2023-01-25 ENCOUNTER — APPOINTMENT (OUTPATIENT)
Dept: RADIOLOGY | Facility: CLINIC | Age: 28
DRG: 441 | End: 2023-01-25
Attending: EMERGENCY MEDICINE
Payer: COMMERCIAL

## 2023-01-25 ENCOUNTER — ANESTHESIA EVENT (OUTPATIENT)
Dept: SURGERY | Facility: CLINIC | Age: 28
DRG: 441 | End: 2023-01-25
Payer: COMMERCIAL

## 2023-01-25 ENCOUNTER — ANESTHESIA (OUTPATIENT)
Dept: SURGERY | Facility: CLINIC | Age: 28
DRG: 441 | End: 2023-01-25
Payer: COMMERCIAL

## 2023-01-25 LAB
ABO/RH(D): NORMAL
ALBUMIN SERPL-MCNC: 3 G/DL (ref 3.5–5)
ALBUMIN UR-MCNC: NEGATIVE MG/DL
ALP SERPL-CCNC: 98 U/L (ref 45–120)
ALT SERPL W P-5'-P-CCNC: 35 U/L (ref 0–45)
ANION GAP SERPL CALCULATED.3IONS-SCNC: 8 MMOL/L (ref 5–18)
ANTIBODY SCREEN: NEGATIVE
APPEARANCE UR: CLEAR
AST SERPL W P-5'-P-CCNC: 53 U/L (ref 0–40)
BILIRUB DIRECT SERPL-MCNC: 2.9 MG/DL
BILIRUB SERPL-MCNC: 14 MG/DL (ref 0–1)
BILIRUB UR QL STRIP: NEGATIVE
BLD PROD TYP BPU: NORMAL
BLOOD COMPONENT TYPE: NORMAL
BUN SERPL-MCNC: 29 MG/DL (ref 8–22)
CALCIUM SERPL-MCNC: 8.7 MG/DL (ref 8.5–10.5)
CHLORIDE BLD-SCNC: 92 MMOL/L (ref 98–107)
CO2 SERPL-SCNC: 26 MMOL/L (ref 22–31)
CODING SYSTEM: NORMAL
COLOR UR AUTO: YELLOW
CREAT SERPL-MCNC: 1.08 MG/DL (ref 0.7–1.3)
CROSSMATCH: NORMAL
CROSSMATCH: NORMAL
ERYTHROCYTE [DISTWIDTH] IN BLOOD BY AUTOMATED COUNT: ABNORMAL %
GFR SERPL CREATININE-BSD FRML MDRD: >90 ML/MIN/1.73M2
GLUCOSE BLD-MCNC: 259 MG/DL (ref 70–125)
GLUCOSE BLDC GLUCOMTR-MCNC: 107 MG/DL (ref 70–99)
GLUCOSE BLDC GLUCOMTR-MCNC: 179 MG/DL (ref 70–99)
GLUCOSE BLDC GLUCOMTR-MCNC: 212 MG/DL (ref 70–99)
GLUCOSE BLDC GLUCOMTR-MCNC: 361 MG/DL (ref 70–99)
GLUCOSE UR STRIP-MCNC: NEGATIVE MG/DL
HCT VFR BLD AUTO: 22.7 % (ref 40–53)
HGB BLD-MCNC: 6.7 G/DL (ref 13.3–17.7)
HGB BLD-MCNC: 7.6 G/DL (ref 13.3–17.7)
HGB BLD-MCNC: 8.1 G/DL (ref 13.3–17.7)
HGB UR QL STRIP: NEGATIVE
INR PPP: 1.83 (ref 0.85–1.15)
ISSUE DATE AND TIME: NORMAL
KETONES UR STRIP-MCNC: NEGATIVE MG/DL
LACTATE SERPL-SCNC: 1.9 MMOL/L (ref 0.7–2)
LACTATE SERPL-SCNC: 2.3 MMOL/L (ref 0.7–2)
LEUKOCYTE ESTERASE UR QL STRIP: NEGATIVE
MCH RBC QN AUTO: 32 PG (ref 26.5–33)
MCHC RBC AUTO-ENTMCNC: 35.7 G/DL (ref 31.5–36.5)
MCV RBC AUTO: 90 FL (ref 78–100)
NITRATE UR QL: NEGATIVE
PH UR STRIP: 5 [PH] (ref 5–7)
PLATELET # BLD AUTO: 142 10E3/UL (ref 150–450)
PLPETH BLD-MCNC: <10 NG/ML
POPETH BLD-MCNC: <10 NG/ML
POTASSIUM BLD-SCNC: 4.6 MMOL/L (ref 3.5–5)
PROT SERPL-MCNC: 6.3 G/DL (ref 6–8)
RBC # BLD AUTO: 2.53 10E6/UL (ref 4.4–5.9)
SODIUM SERPL-SCNC: 126 MMOL/L (ref 136–145)
SP GR UR STRIP: 1.01 (ref 1–1.03)
SPECIMEN EXPIRATION DATE: NORMAL
UNIT ABO/RH: NORMAL
UNIT NUMBER: NORMAL
UNIT STATUS: NORMAL
UNIT TYPE ISBT: 600
UNIT TYPE ISBT: 600
UNIT TYPE ISBT: 6200
UPPER GI ENDOSCOPY: NORMAL
UROBILINOGEN UR STRIP-MCNC: <2 MG/DL
WBC # BLD AUTO: 18 10E3/UL (ref 4–11)

## 2023-01-25 PROCEDURE — 85610 PROTHROMBIN TIME: CPT | Performed by: EMERGENCY MEDICINE

## 2023-01-25 PROCEDURE — 250N000013 HC RX MED GY IP 250 OP 250 PS 637: Performed by: HOSPITALIST

## 2023-01-25 PROCEDURE — 86850 RBC ANTIBODY SCREEN: CPT | Performed by: INTERNAL MEDICINE

## 2023-01-25 PROCEDURE — 36415 COLL VENOUS BLD VENIPUNCTURE: CPT | Performed by: EMERGENCY MEDICINE

## 2023-01-25 PROCEDURE — 85018 HEMOGLOBIN: CPT | Performed by: EMERGENCY MEDICINE

## 2023-01-25 PROCEDURE — 250N000009 HC RX 250: Performed by: ANESTHESIOLOGY

## 2023-01-25 PROCEDURE — 82248 BILIRUBIN DIRECT: CPT | Performed by: INTERNAL MEDICINE

## 2023-01-25 PROCEDURE — 250N000011 HC RX IP 250 OP 636: Performed by: ANESTHESIOLOGY

## 2023-01-25 PROCEDURE — 258N000003 HC RX IP 258 OP 636: Performed by: EMERGENCY MEDICINE

## 2023-01-25 PROCEDURE — 83605 ASSAY OF LACTIC ACID: CPT | Performed by: EMERGENCY MEDICINE

## 2023-01-25 PROCEDURE — 86901 BLOOD TYPING SEROLOGIC RH(D): CPT | Performed by: INTERNAL MEDICINE

## 2023-01-25 PROCEDURE — 360N000075 HC SURGERY LEVEL 2, PER MIN: Performed by: INTERNAL MEDICINE

## 2023-01-25 PROCEDURE — 81003 URINALYSIS AUTO W/O SCOPE: CPT | Performed by: EMERGENCY MEDICINE

## 2023-01-25 PROCEDURE — 999N000141 HC STATISTIC PRE-PROCEDURE NURSING ASSESSMENT: Performed by: INTERNAL MEDICINE

## 2023-01-25 PROCEDURE — 85027 COMPLETE CBC AUTOMATED: CPT | Performed by: EMERGENCY MEDICINE

## 2023-01-25 PROCEDURE — 74174 CTA ABD&PLVS W/CONTRAST: CPT

## 2023-01-25 PROCEDURE — 0DJ08ZZ INSPECTION OF UPPER INTESTINAL TRACT, VIA NATURAL OR ARTIFICIAL OPENING ENDOSCOPIC: ICD-10-PCS | Performed by: INTERNAL MEDICINE

## 2023-01-25 PROCEDURE — 250N000013 HC RX MED GY IP 250 OP 250 PS 637: Performed by: EMERGENCY MEDICINE

## 2023-01-25 PROCEDURE — 250N000011 HC RX IP 250 OP 636: Performed by: EMERGENCY MEDICINE

## 2023-01-25 PROCEDURE — 258N000003 HC RX IP 258 OP 636: Performed by: ANESTHESIOLOGY

## 2023-01-25 PROCEDURE — P9035 PLATELET PHERES LEUKOREDUCED: HCPCS | Performed by: INTERNAL MEDICINE

## 2023-01-25 PROCEDURE — 71045 X-RAY EXAM CHEST 1 VIEW: CPT

## 2023-01-25 PROCEDURE — 250N000011 HC RX IP 250 OP 636: Performed by: HOSPITALIST

## 2023-01-25 PROCEDURE — 710N000010 HC RECOVERY PHASE 1, LEVEL 2, PER MIN: Performed by: INTERNAL MEDICINE

## 2023-01-25 PROCEDURE — 99233 SBSQ HOSP IP/OBS HIGH 50: CPT | Performed by: EMERGENCY MEDICINE

## 2023-01-25 PROCEDURE — 120N000001 HC R&B MED SURG/OB

## 2023-01-25 PROCEDURE — 370N000017 HC ANESTHESIA TECHNICAL FEE, PER MIN: Performed by: INTERNAL MEDICINE

## 2023-01-25 PROCEDURE — 87040 BLOOD CULTURE FOR BACTERIA: CPT | Performed by: EMERGENCY MEDICINE

## 2023-01-25 PROCEDURE — 272N000001 HC OR GENERAL SUPPLY STERILE: Performed by: INTERNAL MEDICINE

## 2023-01-25 PROCEDURE — 86923 COMPATIBILITY TEST ELECTRIC: CPT | Performed by: INTERNAL MEDICINE

## 2023-01-25 RX ORDER — NALOXONE HYDROCHLORIDE 0.4 MG/ML
0.2 INJECTION, SOLUTION INTRAMUSCULAR; INTRAVENOUS; SUBCUTANEOUS
Status: CANCELLED | OUTPATIENT
Start: 2023-01-25

## 2023-01-25 RX ORDER — SODIUM CHLORIDE, SODIUM LACTATE, POTASSIUM CHLORIDE, CALCIUM CHLORIDE 600; 310; 30; 20 MG/100ML; MG/100ML; MG/100ML; MG/100ML
INJECTION, SOLUTION INTRAVENOUS CONTINUOUS PRN
Status: DISCONTINUED | OUTPATIENT
Start: 2023-01-25 | End: 2023-01-25

## 2023-01-25 RX ORDER — PROPOFOL 10 MG/ML
INJECTION, EMULSION INTRAVENOUS CONTINUOUS PRN
Status: DISCONTINUED | OUTPATIENT
Start: 2023-01-25 | End: 2023-01-25

## 2023-01-25 RX ORDER — SODIUM CHLORIDE 9 MG/ML
INJECTION, SOLUTION INTRAVENOUS CONTINUOUS
Status: DISCONTINUED | OUTPATIENT
Start: 2023-01-25 | End: 2023-01-26 | Stop reason: HOSPADM

## 2023-01-25 RX ORDER — FLUMAZENIL 0.1 MG/ML
0.2 INJECTION, SOLUTION INTRAVENOUS
Status: CANCELLED | OUTPATIENT
Start: 2023-01-25 | End: 2023-01-26

## 2023-01-25 RX ORDER — DEXTROSE MONOHYDRATE 25 G/50ML
25-50 INJECTION, SOLUTION INTRAVENOUS
Status: DISCONTINUED | OUTPATIENT
Start: 2023-01-25 | End: 2023-01-26 | Stop reason: HOSPADM

## 2023-01-25 RX ORDER — NICOTINE POLACRILEX 4 MG
15-30 LOZENGE BUCCAL
Status: DISCONTINUED | OUTPATIENT
Start: 2023-01-25 | End: 2023-01-26 | Stop reason: HOSPADM

## 2023-01-25 RX ORDER — PIPERACILLIN SODIUM, TAZOBACTAM SODIUM 3; .375 G/15ML; G/15ML
3.38 INJECTION, POWDER, LYOPHILIZED, FOR SOLUTION INTRAVENOUS ONCE
Status: COMPLETED | OUTPATIENT
Start: 2023-01-25 | End: 2023-01-25

## 2023-01-25 RX ORDER — PIPERACILLIN SODIUM, TAZOBACTAM SODIUM 3; .375 G/15ML; G/15ML
3.38 INJECTION, POWDER, LYOPHILIZED, FOR SOLUTION INTRAVENOUS EVERY 8 HOURS
Status: DISCONTINUED | OUTPATIENT
Start: 2023-01-25 | End: 2023-01-26 | Stop reason: HOSPADM

## 2023-01-25 RX ORDER — LACTULOSE 10 G/15ML
10 SOLUTION ORAL
Status: DISCONTINUED | OUTPATIENT
Start: 2023-01-25 | End: 2023-01-26 | Stop reason: HOSPADM

## 2023-01-25 RX ORDER — PROPOFOL 10 MG/ML
INJECTION, EMULSION INTRAVENOUS PRN
Status: DISCONTINUED | OUTPATIENT
Start: 2023-01-25 | End: 2023-01-25

## 2023-01-25 RX ORDER — LIDOCAINE 40 MG/G
CREAM TOPICAL
Status: DISCONTINUED | OUTPATIENT
Start: 2023-01-25 | End: 2023-01-25 | Stop reason: HOSPADM

## 2023-01-25 RX ORDER — NALOXONE HYDROCHLORIDE 0.4 MG/ML
0.4 INJECTION, SOLUTION INTRAMUSCULAR; INTRAVENOUS; SUBCUTANEOUS
Status: CANCELLED | OUTPATIENT
Start: 2023-01-25

## 2023-01-25 RX ORDER — IOPAMIDOL 755 MG/ML
100 INJECTION, SOLUTION INTRAVASCULAR ONCE
Status: COMPLETED | OUTPATIENT
Start: 2023-01-25 | End: 2023-01-25

## 2023-01-25 RX ORDER — PIPERACILLIN SODIUM, TAZOBACTAM SODIUM 3; .375 G/15ML; G/15ML
3.38 INJECTION, POWDER, LYOPHILIZED, FOR SOLUTION INTRAVENOUS EVERY 8 HOURS
Status: DISCONTINUED | OUTPATIENT
Start: 2023-01-25 | End: 2023-01-25

## 2023-01-25 RX ORDER — LIDOCAINE HYDROCHLORIDE 10 MG/ML
INJECTION, SOLUTION INFILTRATION; PERINEURAL PRN
Status: DISCONTINUED | OUTPATIENT
Start: 2023-01-25 | End: 2023-01-25

## 2023-01-25 RX ADMIN — THERA TABS 1 TABLET: TAB at 08:19

## 2023-01-25 RX ADMIN — PHENYLEPHRINE HYDROCHLORIDE 50 MCG: 10 INJECTION INTRAVENOUS at 15:24

## 2023-01-25 RX ADMIN — RIFAXIMIN 550 MG: 550 TABLET ORAL at 19:52

## 2023-01-25 RX ADMIN — PROPOFOL 50 MG: 10 INJECTION, EMULSION INTRAVENOUS at 15:15

## 2023-01-25 RX ADMIN — LACTULOSE 10 G: 10 SOLUTION ORAL at 10:51

## 2023-01-25 RX ADMIN — FLUOXETINE 60 MG: 20 CAPSULE ORAL at 22:15

## 2023-01-25 RX ADMIN — LIDOCAINE HYDROCHLORIDE 20 MG: 10 INJECTION, SOLUTION INFILTRATION; PERINEURAL at 15:15

## 2023-01-25 RX ADMIN — FOLIC ACID 1 MG: 1 TABLET ORAL at 08:19

## 2023-01-25 RX ADMIN — INSULIN GLARGINE 16 UNITS: 100 INJECTION, SOLUTION SUBCUTANEOUS at 19:53

## 2023-01-25 RX ADMIN — PROPOFOL 50 MG: 10 INJECTION, EMULSION INTRAVENOUS at 15:23

## 2023-01-25 RX ADMIN — SODIUM CHLORIDE: 9 INJECTION, SOLUTION INTRAVENOUS at 18:47

## 2023-01-25 RX ADMIN — PHENYLEPHRINE HYDROCHLORIDE 50 MCG: 10 INJECTION INTRAVENOUS at 15:18

## 2023-01-25 RX ADMIN — SODIUM CHLORIDE 1000 ML: 9 INJECTION, SOLUTION INTRAVENOUS at 11:35

## 2023-01-25 RX ADMIN — LACTULOSE 10 G: 10 SOLUTION ORAL at 19:52

## 2023-01-25 RX ADMIN — BUMETANIDE 1 MG: 1 TABLET ORAL at 08:19

## 2023-01-25 RX ADMIN — SODIUM CHLORIDE, SODIUM LACTATE, POTASSIUM CHLORIDE, CALCIUM CHLORIDE: 600; 310; 30; 20 INJECTION, SOLUTION INTRAVENOUS at 15:12

## 2023-01-25 RX ADMIN — Medication 100 MG: at 08:19

## 2023-01-25 RX ADMIN — LACTULOSE 10 G: 10 SOLUTION ORAL at 22:15

## 2023-01-25 RX ADMIN — PROPOFOL 250 MCG/KG/MIN: 10 INJECTION, EMULSION INTRAVENOUS at 15:15

## 2023-01-25 RX ADMIN — LACTULOSE 20 G: 10 SOLUTION ORAL at 08:19

## 2023-01-25 RX ADMIN — INSULIN GLARGINE 16 UNITS: 100 INJECTION, SOLUTION SUBCUTANEOUS at 08:25

## 2023-01-25 RX ADMIN — IOPAMIDOL 90 ML: 755 INJECTION, SOLUTION INTRAVENOUS at 20:18

## 2023-01-25 RX ADMIN — RIFAXIMIN 550 MG: 550 TABLET ORAL at 08:25

## 2023-01-25 RX ADMIN — PIPERACILLIN AND TAZOBACTAM 3.38 G: 3; .375 INJECTION, POWDER, FOR SOLUTION INTRAVENOUS at 10:51

## 2023-01-25 RX ADMIN — CEFTRIAXONE 1 G: 1 INJECTION, POWDER, FOR SOLUTION INTRAMUSCULAR; INTRAVENOUS at 08:19

## 2023-01-25 RX ADMIN — PHENYLEPHRINE HYDROCHLORIDE 100 MCG: 10 INJECTION INTRAVENOUS at 15:27

## 2023-01-25 ASSESSMENT — ACTIVITIES OF DAILY LIVING (ADL)
ADLS_ACUITY_SCORE: 36
ADLS_ACUITY_SCORE: 43
ADLS_ACUITY_SCORE: 36
ADLS_ACUITY_SCORE: 43
ADLS_ACUITY_SCORE: 43
ADLS_ACUITY_SCORE: 36
ADLS_ACUITY_SCORE: 43
ADLS_ACUITY_SCORE: 43
ADLS_ACUITY_SCORE: 36
ADLS_ACUITY_SCORE: 43
ADLS_ACUITY_SCORE: 36
ADLS_ACUITY_SCORE: 43

## 2023-01-25 NOTE — PLAN OF CARE
"  Problem: Plan of Care - These are the overarching goals to be used throughout the patient stay.    Goal: Plan of Care Review  Description: The Plan of Care Review/Shift note should be completed every shift.  The Outcome Evaluation is a brief statement about your assessment that the patient is improving, declining, or no change.  This information will be displayed automatically on your shift note.  Outcome: Not Progressing     Problem: Plan of Care - These are the overarching goals to be used throughout the patient stay.    Goal: Patient-Specific Goal (Individualized)  Description: You can add care plan individualizations to a care plan. Examples of Individualization might be:  \"Parent requests to be called daily at 9am for status\", \"I have a hard time hearing out of my right ear\", or \"Do not touch me to wake me up as it startles me\".  Outcome: Not Progressing   Goal Outcome Evaluation:       VSS on RA. Tachy. Two large blood stools, EGD done see notes. Hgb 7.6 at 1400. CT scan scheduled. Hemodynamically stable, will revisit plan tomorrow. Hgb Q8. Discharge pending                  "

## 2023-01-25 NOTE — PLAN OF CARE
Problem: Gastrointestinal Bleeding  Goal: Optimal Coping with Acute Illness  Outcome: Adequate for Care Transition   Goal Outcome Evaluation:  Pt had 2units PRBC today.  Hgb recheck is 8.3.  Pt's mentation has cleared more throughout the day.  He continues to have bowel movements that are black with bright red blood.  He continues to cough up blood, unsure where the source of the bleeding is (gums?).  Pt has been much more talkative, joking, singing, etc.  He is eager to visit his old nurses upstairs in the ICU. Pt's mother remains at bedside.     Admitted

## 2023-01-25 NOTE — PLAN OF CARE
Problem: Confusion Acute  Goal: Optimal Cognitive Function  Outcome: Progressing     Problem: Electrolyte Imbalance  Goal: Electrolyte Imbalance: Plan of Care  Outcome: Progressing   Pt oriented x4. SBA. Feeling much better today than yesterday. Tolerating clear liquids well, denying nausea. Still having numerous bowel movements, 1-2 per hour medium to large loose bloody stools. Gums still bleeding but less clots than yesterday. Some episodes of incontinence. Denying pain. Mother Leticia at bedside. Pt would love to visit 3N nurses during day shift if possible.

## 2023-01-25 NOTE — PROGRESS NOTES
Gastroenterology Brief Note:    Patient seen this morning. Hemoglobin 8.1 this AM from 8.3 at 1854 last night but pt reports continued melena with three black/blood stools since midnight. No abdominal pain. Gums still bleeding some. Discussed with Dr. Boo and will plan repeat EGD today.       NPO  EGD with MAC today  If EGD negative consider CTA      (Dr. Boo)  Cece Heaton PA-C  [facility]  1/25/2023 10:58 AM

## 2023-01-25 NOTE — ANESTHESIA CARE TRANSFER NOTE
Patient: Dillon Salter    Procedure: Procedure(s):  ESOPHAGOGASTRODUODENOSCOPY (EGD)       Diagnosis: Melena [K92.1]  Secondary esophageal varices with bleeding (H) [I85.11]  Diagnosis Additional Information: No value filed.    Anesthesia Type:   General     Note:    Oropharynx: oropharynx clear of all foreign objects  Level of Consciousness: drowsy  Oxygen Supplementation: face mask  Level of Supplemental Oxygen (L/min / FiO2): 8  Independent Airway: airway patency satisfactory and stable  Dentition: dentition unchanged  Vital Signs Stable: post-procedure vital signs reviewed and stable  Report to RN Given: handoff report given  Patient transferred to: Phase II    Handoff Report: Identifed the Patient, Identified the Reponsible Provider, Reviewed the pertinent medical history, Discussed the surgical course, Reviewed Intra-OP anesthesia mangement and issues during anesthesia, Set expectations for post-procedure period and Allowed opportunity for questions and acknowledgement of understanding      Vitals:  Vitals Value Taken Time   /53 01/25/23 1541   Temp 36.4  C (97.5  F) 01/25/23 1541   Pulse 91 01/25/23 1542   Resp 16    SpO2 100 % 01/25/23 1542   Vitals shown include unvalidated device data.    Electronically Signed By: VAMSHI Barron CRNA  January 25, 2023  3:44 PM

## 2023-01-25 NOTE — ANESTHESIA PREPROCEDURE EVALUATION
Anesthesia Pre-Procedure Evaluation    Patient: Dillon Salter   MRN: [id number] : 1995        Procedure : Procedure(s):  ESOPHAGOGASTRODUODENOSCOPY (EGD)          Past Medical History:   Diagnosis Date     Acute on chronic anemia 2022     Alcohol use disorder      Alcoholic cirrhosis of liver with ascites (H)      Alcoholic hepatitis      Autism spectrum      Autism spectrum disorder 2022    High functioning autistic.  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  lives with parents (Leticia and Wes) socially isolated drinks alcohol beer daily      Benign essential hypertension      Bleeding esophageal varices (H) 2022     Hepatic encephalopathy      Hyponatremia 2022     Major depressive disorder      Type II Diabetes       Past Surgical History:   Procedure Laterality Date     ESOPHAGOSCOPY, GASTROSCOPY, DUODENOSCOPY (EGD), COMBINED N/A 2022    Procedure: ESOPHAGOGASTRODUODENOSCOPY (EGD) with esophageal banding;  Surgeon: Gary Hurst MD;  Location: [facility] Main OR     ESOPHAGOSCOPY, GASTROSCOPY, DUODENOSCOPY (EGD), COMBINED N/A 2022    Procedure: ESOPHAGOGASTRODUODENOSCOPY;  Surgeon: Gavino Reza MD;  Location: [facility] Main OR     ESOPHAGOSCOPY, GASTROSCOPY, DUODENOSCOPY (EGD), COMBINED N/A 2023    Procedure: ESOPHAGOGASTRODUODENOSCOPY (EGD) with esophageal variceal banding;  Surgeon: Juan Boo MD;  Location: [facility] Main OR     MIDLINE DOUBLE LUMEN PLACEMENT  2022          PICC TRIPLE LUMEN PLACEMENT  2022           No Known Allergies   Social History     Tobacco Use     Smoking status: Former     Smokeless tobacco: Never     Tobacco comments:     A pack lasted a year, hardly smoked   Substance Use Topics     Alcohol use: Not Currently     Comment: No ETOH since 22      Wt Readings from Last 1  Encounters:   01/22/23 66.3 kg (146 lb 3.2 oz)        Anesthesia Evaluation   Pt has had prior anesthetic.     No history of anesthetic complications       ROS/MED HX  ENT/Pulmonary:  - neg pulmonary ROS     Neurologic: Comment: Hepatic encephalopathy  Autism spectrum disorder      Cardiovascular:     (+) hypertension-----    METS/Exercise Tolerance:     Hematologic:     (+) anemia,     Musculoskeletal:       GI/Hepatic: Comment: hematemesis    (+) liver disease (alcoholic cirrhosis),     Renal/Genitourinary:       Endo:     (+) type II DM,     Psychiatric/Substance Use:       Infectious Disease:       Malignancy:       Other:            Physical Exam    Airway   unable to assess   Comment: Pt not cooperating with exam         Respiratory Devices and Support         Dental     Comment: Poor teeth per pt's mom        Cardiovascular   cardiovascular exam normal          Pulmonary   pulmonary exam normal                OUTSIDE LABS:  CBC:   Lab Results   Component Value Date    WBC 18.0 (H) 01/25/2023    WBC 15.0 (H) 01/24/2023    HGB 8.1 (L) 01/25/2023    HGB 8.3 (L) 01/24/2023    HCT 22.7 (L) 01/25/2023    HCT 17.9 (L) 01/24/2023     (L) 01/25/2023     (L) 01/24/2023     BMP:   Lab Results   Component Value Date     (L) 01/25/2023     (L) 01/24/2023    POTASSIUM 4.6 01/25/2023    POTASSIUM 4.9 01/24/2023    CHLORIDE 92 (L) 01/25/2023    CHLORIDE 96 (L) 01/24/2023    CO2 26 01/25/2023    CO2 24 01/24/2023    BUN 29 (H) 01/25/2023    BUN 32 (H) 01/24/2023    CR 1.08 01/25/2023    CR 0.89 01/24/2023     (H) 01/25/2023     (H) 01/25/2023     COAGS:   Lab Results   Component Value Date    PTT 46 (H) 01/24/2023    INR 1.83 (H) 01/25/2023    FIBR 228 08/14/2022     POC: No results found for: BGM, HCG, HCGS  HEPATIC:   Lab Results   Component Value Date    ALBUMIN 3.0 (L) 01/25/2023    PROTTOTAL 6.3 01/25/2023    ALT 35 01/25/2023    AST 53 (H) 01/25/2023    ALKPHOS 98 01/25/2023     BILITOTAL 14.0 (H) 01/25/2023    DESI 77 (H) 01/21/2023     OTHER:   Lab Results   Component Value Date    LACT 1.9 01/25/2023    A1C 5.4 07/28/2022    SARTHAK 8.7 01/25/2023    PHOS 5.3 (H) 08/14/2022    MAG 1.6 (L) 01/24/2023    LIPASE 50 08/14/2022    TSH 1.09 07/30/2022       Anesthesia Plan    ASA Status:  4   NPO Status:  NPO Appropriate    Anesthesia Type: MAC.     - Reason for MAC: chronic cardiopulmonary disease              Consents    Anesthesia Plan(s) and associated risks, benefits, and realistic alternatives discussed. Questions answered and patient/representative(s) expressed understanding.    - Discussed: Risks, Benefits and Alternatives for the PROCEDURE were discussed     - Discussed with: Plan/risks discussed with: spoke with patient's mom via phone.         Postoperative Care    Pain management: IV analgesics, Oral pain medications.   PONV prophylaxis: Ondansetron (or other 5HT-3), Dexamethasone or Solumedrol     Comments:                    Jos Haywood MD

## 2023-01-25 NOTE — PROGRESS NOTES
Pre-procedure Note    Reason for procedure: Melena, anemia, cirrhosis, esophageal varices, portal HTN gastropathy    History and Physical Reviewed: Reviewed, no changes.    Pre-sedation assessment:    General: alert and cooperative, chronically ill appearing, cachectic  Airway: normal  Heart: Tachy  Lungs: clear to auscultation bilaterally    Sedation Plan based on assessment: MAC    Mallampati score: Class III (visualization of the soft palate and base of uvula)          ASA Classification: ASA 4 - Patient with severe systemic disease that is a constant threat to life    Impression: Patient deemed adequate candidate for MAC sedation    Risks, benefits and alternatives were discussed with the patient and informed consent was obtained.    Plan: esophagogastroduodenoscopy      Juan Boo MD 1/25/2023 3:02 PM                                               Juan Boo MD  Thank you for the opportunity to participate in the care of this patient.   Please feel free to call me with any questions or concerns.  Phone number [phone].

## 2023-01-25 NOTE — PROGRESS NOTES
[facility] MEDICINE PROGRESS NOTE      Summary:  27 year old male on hospital day 5 after being admitted for acute on chronic liver disease caused by alcoholic cirrhosis.  He was diagnosed in 4/2022.  He has recurring sequela  Including esophageal varices, encephalopathy, thrombocytopenia with acute hematemesis  Yesterday.  EGD on 1/21 per the GI team showed esophageal varices requiring banding in addition to portal gastropathy.      Overnight the mother and patient state he has recurrent blood stools.  Denies  Abdominal pain.  U/S yesterday was negative for ascites.    The white count today is 18 with a borderline lactic acid.   Will pursue work up  For infection source.       Problem list:    1.  Hematemesis: s/p EGD #2 yesterday; continue PPI BID; pt had large bloody stool   This morning; EGD #3 pending  2.  Metabolic encephalopathy: lactulose 20 grams 3 times daily; rifaximin; improved   Today; he is polite and clear minded  3.  Anemia: acute on chronic due to acute blood loss: hgb today is 6.2 (down from   8.2); transfuse 2 units packed red cells today;   4.  Esophageal varices/portal gastropathy: PPI BID, octreotide; clear liquids today;   5.  Leukocytosis/borderline lactic acid: increase antibiotic coverage, urinalysis, xr chest  6.  Acute on chronic liver disease: MELD today is 26; case discussed with GI who    Discussed with [facility]; no indication for transfer yet; continue with   7.  ASD: (mother is caretaker)      Delmi Stephenson MD  [facility] Medicine  [facility]  Phone: #[phone]      Interval History/Subjective: slept better last night; still with frequent red stools  Though no abdominal pain; no food for 3 days    Physical Exam/Objective:  Temp:  [97.5  F (36.4  C)-100  F (37.8  C)] 97.9  F (36.6  C)  Pulse:  [] 96  Resp:  [14-18] 16  BP: (131-147)/(69-82) 147/78  SpO2:  [98 %-100 %] 98 %  Body mass index is 28.55  kg/m .    GENERAL:  Alert, chronically ill; jaundiced, polite and oriented   HEAD:  Normocephalic, without obvious abnormality, atraumatic   EYES:  PERRL, conjunctiva/corneas clear, no scleral icterus, EOM's intact   NOSE: Nares normal, septum midline, mucosa normal, no drainage   THROAT: Mouth with +++ dried blood; gums are oozing   NECK: Supple, symmetrical, trachea midline   BACK:   Symmetric, no curvature, ROM normal   LUNGS:   Clear to auscultation bilaterally, no rales, rhonchi, or wheezing, symmetric chest rise on inhalation, respirations unlabored   CHEST WALL:  No tenderness or deformity   HEART:  Regular rate and rhythm, S1 and S2 normal, no murmur, rub, or gallop    ABDOMEN:   Soft, mild distention; likely due to ascites; no spider angiomata   EXTREMITIES: Extremities normal, atraumatic, no cyanosis or edema    SKIN: Dry to touch, no exanthems in the visualized areas   NEURO: Alert, oriented x3, moves all four extremities freely   PSYCH: Cooperative, behavior is appropriate      Data reviewed today: I personally reviewed all new medications, labs, imaging/diagnostics reports over the past 24 hours. Pertinent findings include:    Imaging:   Recent Results (from the past 24 hour(s))   XR Chest Port 1 View    Narrative    EXAM: XR CHEST PORT 1 VIEW  LOCATION: [facility]  DATE/TIME: 1/25/2023 10:33 AM    INDICATION: white count, fever  COMPARISON: 12/22/2022      Impression    IMPRESSION: Lungs are clear. No pleural effusion. Heart size and pulmonary vascularity within normal limits.       Labs:  Most Recent 3 CBC's:  Recent Labs   Lab Test 01/25/23  0831 01/24/23  1854 01/24/23  0809 01/23/23  0856   WBC 18.0*  --  15.0* 12.8*   HGB 8.1* 8.3* 6.2* 8.2*   MCV 90  --  107* 105*   *  --  142* 147*     Most Recent 3 BMP's:  Recent Labs   Lab Test 01/25/23  1218 01/25/23  0831 01/25/23  0203 01/24/23  1330 01/24/23  0809 01/22/23  1146 01/22/23  1002   NA  --  126*  --   --  132*   --  132*   POTASSIUM  --  4.6  --   --  4.9  --  4.1   CHLORIDE  --  92*  --   --  96*  --  95*   CO2  --  26  --   --  24  --  24   BUN  --  29*  --   --  32*  --  28*   CR  --  1.08  --   --  0.89  --  0.97   ANIONGAP  --  8  --   --  12  --  13   SARTHAK  --  8.7  --   --  8.8  --  9.4   * 259* 361*   < > 150*   < > 175*    < > = values in this interval not displayed.       Medications:   Personally Reviewed.  Medications     octreotide (sandoSTATIN) infusion ADULT 50 mcg/hr (01/24/23 4166)     sodium chloride         [Auto Hold] bumetanide  1 mg Oral Daily     [Auto Hold] FLUoxetine  60 mg Oral At Bedtime     [Auto Hold] folic acid  1 mg Oral Daily     [Auto Hold] insulin aspart  1-7 Units Subcutaneous TID AC     [Auto Hold] insulin aspart  1-5 Units Subcutaneous At Bedtime     [Auto Hold] insulin glargine  16 Units Subcutaneous BID     [Auto Hold] lactulose  10 g Oral 6x Daily     [Auto Hold] multivitamin, therapeutic  1 tablet Oral Daily     [Auto Hold] pantoprazole  40 mg Intravenous Q12H     [Auto Hold] piperacillin-tazobactam  3.375 g Intravenous Q8H     [Auto Hold] rifaximin  550 mg Oral BID     [Auto Hold] sodium chloride (PF)  3 mL Intracatheter Q8H     [Held by provider] spironolactone  100 mg Oral At Bedtime     [Auto Hold] thiamine  100 mg Oral Daily

## 2023-01-26 ENCOUNTER — ANESTHESIA EVENT (OUTPATIENT)
Dept: SURGERY | Facility: CLINIC | Age: 28
DRG: 377 | End: 2023-01-26
Payer: COMMERCIAL

## 2023-01-26 ENCOUNTER — HOSPITAL ENCOUNTER (INPATIENT)
Facility: CLINIC | Age: 28
LOS: 3 days | Discharge: HOME | DRG: 377 | End: 2023-01-29
Attending: INTERNAL MEDICINE | Admitting: INTERNAL MEDICINE
Payer: COMMERCIAL

## 2023-01-26 VITALS
OXYGEN SATURATION: 98 % | RESPIRATION RATE: 18 BRPM | HEIGHT: 60 IN | BODY MASS INDEX: 28.7 KG/M2 | WEIGHT: 146.2 LBS | SYSTOLIC BLOOD PRESSURE: 136 MMHG | DIASTOLIC BLOOD PRESSURE: 80 MMHG | HEART RATE: 105 BPM | TEMPERATURE: 97.6 F

## 2023-01-26 DIAGNOSIS — K62.5 GASTROINTESTINAL HEMORRHAGE ASSOCIATED WITH ANORECTAL SOURCE: Primary | ICD-10-CM

## 2023-01-26 DIAGNOSIS — E11.69 DIABETES MELLITUS TYPE 2 IN OBESE: Chronic | ICD-10-CM

## 2023-01-26 DIAGNOSIS — K76.82 HEPATIC ENCEPHALOPATHY (H): ICD-10-CM

## 2023-01-26 DIAGNOSIS — E66.9 DIABETES MELLITUS TYPE 2 IN OBESE: Chronic | ICD-10-CM

## 2023-01-26 DIAGNOSIS — K72.10 END STAGE LIVER DISEASE (H): ICD-10-CM

## 2023-01-26 PROBLEM — K92.2 GI BLEED: Status: ACTIVE | Noted: 2023-01-26

## 2023-01-26 LAB
ABO/RH(D): NORMAL
ALBUMIN SERPL BCG-MCNC: 2.8 G/DL (ref 3.5–5.2)
ALBUMIN SERPL BCG-MCNC: 3 G/DL (ref 3.5–5.2)
ALP SERPL-CCNC: 70 U/L (ref 40–129)
ALP SERPL-CCNC: 84 U/L (ref 40–129)
ALT SERPL W P-5'-P-CCNC: 26 U/L (ref 10–50)
ALT SERPL W P-5'-P-CCNC: 26 U/L (ref 10–50)
ANION GAP SERPL CALCULATED.3IONS-SCNC: 10 MMOL/L (ref 7–15)
ANION GAP SERPL CALCULATED.3IONS-SCNC: 9 MMOL/L (ref 7–15)
ANTIBODY SCREEN: NEGATIVE
APTT PPP: 41 SECONDS (ref 22–38)
AST SERPL W P-5'-P-CCNC: 63 U/L (ref 10–50)
AST SERPL W P-5'-P-CCNC: 66 U/L (ref 10–50)
BACTERIA BLD CULT: NO GROWTH
BACTERIA BLD CULT: NO GROWTH
BILIRUB SERPL-MCNC: 10.3 MG/DL
BILIRUB SERPL-MCNC: 8.9 MG/DL
BUN SERPL-MCNC: 19.8 MG/DL (ref 6–20)
BUN SERPL-MCNC: 22.2 MG/DL (ref 6–20)
CALCIUM SERPL-MCNC: 8.1 MG/DL (ref 8.6–10)
CALCIUM SERPL-MCNC: 8.4 MG/DL (ref 8.6–10)
CHLORIDE SERPL-SCNC: 95 MMOL/L (ref 98–107)
CHLORIDE SERPL-SCNC: 98 MMOL/L (ref 98–107)
CREAT SERPL-MCNC: 0.87 MG/DL (ref 0.67–1.17)
CREAT SERPL-MCNC: 0.95 MG/DL (ref 0.67–1.17)
DEPRECATED HCO3 PLAS-SCNC: 24 MMOL/L (ref 22–29)
DEPRECATED HCO3 PLAS-SCNC: 25 MMOL/L (ref 22–29)
ERYTHROCYTE [DISTWIDTH] IN BLOOD BY AUTOMATED COUNT: 21.5 % (ref 10–15)
ERYTHROCYTE [DISTWIDTH] IN BLOOD BY AUTOMATED COUNT: 21.5 % (ref 10–15)
ERYTHROCYTE [DISTWIDTH] IN BLOOD BY AUTOMATED COUNT: 21.6 % (ref 10–15)
ERYTHROCYTE [DISTWIDTH] IN BLOOD BY AUTOMATED COUNT: 21.9 % (ref 10–15)
FIBRINOGEN PPP-MCNC: 132 MG/DL (ref 170–490)
FIBRINOGEN PPP-MCNC: 147 MG/DL (ref 170–490)
FIBRINOGEN PPP-MCNC: 148 MG/DL (ref 170–490)
GFR SERPL CREATININE-BSD FRML MDRD: >90 ML/MIN/1.73M2
GFR SERPL CREATININE-BSD FRML MDRD: >90 ML/MIN/1.73M2
GLUCOSE BLDC GLUCOMTR-MCNC: 122 MG/DL (ref 70–99)
GLUCOSE BLDC GLUCOMTR-MCNC: 123 MG/DL (ref 70–99)
GLUCOSE BLDC GLUCOMTR-MCNC: 132 MG/DL (ref 70–99)
GLUCOSE BLDC GLUCOMTR-MCNC: 148 MG/DL (ref 70–99)
GLUCOSE BLDC GLUCOMTR-MCNC: 168 MG/DL (ref 70–99)
GLUCOSE BLDC GLUCOMTR-MCNC: 61 MG/DL (ref 70–99)
GLUCOSE BLDC GLUCOMTR-MCNC: 93 MG/DL (ref 70–99)
GLUCOSE SERPL-MCNC: 115 MG/DL (ref 70–99)
GLUCOSE SERPL-MCNC: 183 MG/DL (ref 70–99)
HCT VFR BLD AUTO: 23.5 % (ref 40–53)
HCT VFR BLD AUTO: 24.7 % (ref 40–53)
HCT VFR BLD AUTO: 25.3 % (ref 40–53)
HCT VFR BLD AUTO: 27.4 % (ref 40–53)
HGB BLD-MCNC: 7.9 G/DL (ref 13.3–17.7)
HGB BLD-MCNC: 8.5 G/DL (ref 13.3–17.7)
HGB BLD-MCNC: 8.7 G/DL (ref 13.3–17.7)
HGB BLD-MCNC: 9.5 G/DL (ref 13.3–17.7)
INR PPP: 1.87 (ref 0.85–1.15)
LACTATE SERPL-SCNC: 1 MMOL/L (ref 0.7–2)
MAGNESIUM SERPL-MCNC: 1.3 MG/DL (ref 1.7–2.3)
MAGNESIUM SERPL-MCNC: 2.1 MG/DL (ref 1.7–2.3)
MCH RBC QN AUTO: 31.5 PG (ref 26.5–33)
MCH RBC QN AUTO: 31.7 PG (ref 26.5–33)
MCH RBC QN AUTO: 32 PG (ref 26.5–33)
MCH RBC QN AUTO: 32.1 PG (ref 26.5–33)
MCHC RBC AUTO-ENTMCNC: 33.6 G/DL (ref 31.5–36.5)
MCHC RBC AUTO-ENTMCNC: 34.4 G/DL (ref 31.5–36.5)
MCHC RBC AUTO-ENTMCNC: 34.4 G/DL (ref 31.5–36.5)
MCHC RBC AUTO-ENTMCNC: 34.7 G/DL (ref 31.5–36.5)
MCV RBC AUTO: 92 FL (ref 78–100)
MCV RBC AUTO: 93 FL (ref 78–100)
MCV RBC AUTO: 93 FL (ref 78–100)
MCV RBC AUTO: 94 FL (ref 78–100)
PHOSPHATE SERPL-MCNC: 3.3 MG/DL (ref 2.5–4.5)
PHOSPHATE SERPL-MCNC: 3.7 MG/DL (ref 2.5–4.5)
PLATELET # BLD AUTO: 111 10E3/UL (ref 150–450)
PLATELET # BLD AUTO: 118 10E3/UL (ref 150–450)
PLATELET # BLD AUTO: 120 10E3/UL (ref 150–450)
PLATELET # BLD AUTO: 136 10E3/UL (ref 150–450)
POTASSIUM SERPL-SCNC: 3.7 MMOL/L (ref 3.4–5.3)
POTASSIUM SERPL-SCNC: 4 MMOL/L (ref 3.4–5.3)
PROT SERPL-MCNC: 5.1 G/DL (ref 6.4–8.3)
PROT SERPL-MCNC: 5.7 G/DL (ref 6.4–8.3)
RADIOLOGIST FLAGS: ABNORMAL
RBC # BLD AUTO: 2.51 10E6/UL (ref 4.4–5.9)
RBC # BLD AUTO: 2.68 10E6/UL (ref 4.4–5.9)
RBC # BLD AUTO: 2.72 10E6/UL (ref 4.4–5.9)
RBC # BLD AUTO: 2.96 10E6/UL (ref 4.4–5.9)
SODIUM SERPL-SCNC: 129 MMOL/L (ref 136–145)
SODIUM SERPL-SCNC: 132 MMOL/L (ref 136–145)
SPECIMEN EXPIRATION DATE: NORMAL
WBC # BLD AUTO: 14.9 10E3/UL (ref 4–11)
WBC # BLD AUTO: 15.1 10E3/UL (ref 4–11)
WBC # BLD AUTO: 15.8 10E3/UL (ref 4–11)
WBC # BLD AUTO: 16.1 10E3/UL (ref 4–11)

## 2023-01-26 PROCEDURE — 83605 ASSAY OF LACTIC ACID: CPT

## 2023-01-26 PROCEDURE — P9016 RBC LEUKOCYTES REDUCED: HCPCS | Performed by: INTERNAL MEDICINE

## 2023-01-26 PROCEDURE — 84100 ASSAY OF PHOSPHORUS: CPT

## 2023-01-26 PROCEDURE — C9113 INJ PANTOPRAZOLE SODIUM, VIA: HCPCS

## 2023-01-26 PROCEDURE — 99223 1ST HOSP IP/OBS HIGH 75: CPT | Mod: AI | Performed by: INTERNAL MEDICINE

## 2023-01-26 PROCEDURE — 85027 COMPLETE CBC AUTOMATED: CPT

## 2023-01-26 PROCEDURE — 83735 ASSAY OF MAGNESIUM: CPT

## 2023-01-26 PROCEDURE — 250N000011 HC RX IP 250 OP 636: Performed by: EMERGENCY MEDICINE

## 2023-01-26 PROCEDURE — 36415 COLL VENOUS BLD VENIPUNCTURE: CPT

## 2023-01-26 PROCEDURE — 84155 ASSAY OF PROTEIN SERUM: CPT

## 2023-01-26 PROCEDURE — 258N000003 HC RX IP 258 OP 636

## 2023-01-26 PROCEDURE — 85384 FIBRINOGEN ACTIVITY: CPT

## 2023-01-26 PROCEDURE — 80321 ALCOHOLS BIOMARKERS 1OR 2: CPT

## 2023-01-26 PROCEDURE — 200N000002 HC R&B ICU UMMC

## 2023-01-26 PROCEDURE — 250N000011 HC RX IP 250 OP 636

## 2023-01-26 PROCEDURE — 250N000013 HC RX MED GY IP 250 OP 250 PS 637

## 2023-01-26 PROCEDURE — 258N000001 HC RX 258

## 2023-01-26 PROCEDURE — 93005 ELECTROCARDIOGRAM TRACING: CPT

## 2023-01-26 PROCEDURE — 86901 BLOOD TYPING SEROLOGIC RH(D): CPT

## 2023-01-26 PROCEDURE — 99238 HOSP IP/OBS DSCHRG MGMT 30/<: CPT | Performed by: INTERNAL MEDICINE

## 2023-01-26 PROCEDURE — 85730 THROMBOPLASTIN TIME PARTIAL: CPT

## 2023-01-26 PROCEDURE — 99222 1ST HOSP IP/OBS MODERATE 55: CPT | Performed by: INTERNAL MEDICINE

## 2023-01-26 PROCEDURE — C9113 INJ PANTOPRAZOLE SODIUM, VIA: HCPCS | Performed by: PHYSICIAN ASSISTANT

## 2023-01-26 PROCEDURE — 84450 TRANSFERASE (AST) (SGOT): CPT

## 2023-01-26 PROCEDURE — 250N000011 HC RX IP 250 OP 636: Performed by: PHYSICIAN ASSISTANT

## 2023-01-26 PROCEDURE — 86850 RBC ANTIBODY SCREEN: CPT

## 2023-01-26 PROCEDURE — 93010 ELECTROCARDIOGRAM REPORT: CPT | Performed by: INTERNAL MEDICINE

## 2023-01-26 PROCEDURE — 85610 PROTHROMBIN TIME: CPT

## 2023-01-26 RX ORDER — GABAPENTIN 100 MG/1
100 CAPSULE ORAL DAILY PRN
Status: CANCELLED | OUTPATIENT
Start: 2023-01-26

## 2023-01-26 RX ORDER — NICOTINE POLACRILEX 4 MG
15-30 LOZENGE BUCCAL
Status: DISCONTINUED | OUTPATIENT
Start: 2023-01-26 | End: 2023-01-29

## 2023-01-26 RX ORDER — MAGNESIUM SULFATE HEPTAHYDRATE 40 MG/ML
2 INJECTION, SOLUTION INTRAVENOUS ONCE
Status: COMPLETED | OUTPATIENT
Start: 2023-01-26 | End: 2023-01-26

## 2023-01-26 RX ORDER — ONDANSETRON 4 MG/1
4 TABLET, ORALLY DISINTEGRATING ORAL EVERY 6 HOURS PRN
Status: CANCELLED | OUTPATIENT
Start: 2023-01-26

## 2023-01-26 RX ORDER — GABAPENTIN 100 MG/1
100 CAPSULE ORAL DAILY PRN
Status: DISCONTINUED | OUTPATIENT
Start: 2023-01-26 | End: 2023-01-29 | Stop reason: HOSPADM

## 2023-01-26 RX ORDER — LACTULOSE 10 G/15ML
10 SOLUTION ORAL
Status: CANCELLED | OUTPATIENT
Start: 2023-01-26

## 2023-01-26 RX ORDER — BUMETANIDE 1 MG/1
1 TABLET ORAL DAILY
Status: CANCELLED | OUTPATIENT
Start: 2023-01-26

## 2023-01-26 RX ORDER — DEXTROSE MONOHYDRATE 25 G/50ML
25-50 INJECTION, SOLUTION INTRAVENOUS
Status: CANCELLED | OUTPATIENT
Start: 2023-01-26

## 2023-01-26 RX ORDER — CHLORHEXIDINE GLUCONATE ORAL RINSE 1.2 MG/ML
15 SOLUTION DENTAL 3 TIMES DAILY PRN
Status: CANCELLED | OUTPATIENT
Start: 2023-01-26

## 2023-01-26 RX ORDER — LACTULOSE 10 G/10G
20 SOLUTION ORAL 3 TIMES DAILY
Status: DISCONTINUED | OUTPATIENT
Start: 2023-01-26 | End: 2023-01-29 | Stop reason: HOSPADM

## 2023-01-26 RX ORDER — CEFTRIAXONE 1 G/1
1 INJECTION, POWDER, FOR SOLUTION INTRAMUSCULAR; INTRAVENOUS EVERY 24 HOURS
Status: DISCONTINUED | OUTPATIENT
Start: 2023-01-26 | End: 2023-01-29 | Stop reason: HOSPADM

## 2023-01-26 RX ORDER — PANTOPRAZOLE SODIUM 40 MG/1
40 TABLET, DELAYED RELEASE ORAL DAILY
Status: DISCONTINUED | OUTPATIENT
Start: 2023-01-26 | End: 2023-01-28

## 2023-01-26 RX ORDER — MULTIVITAMIN,THERAPEUTIC
1 TABLET ORAL DAILY
Status: CANCELLED | OUTPATIENT
Start: 2023-01-26

## 2023-01-26 RX ORDER — FOLIC ACID 1 MG/1
1 TABLET ORAL DAILY
Status: CANCELLED | OUTPATIENT
Start: 2023-01-26

## 2023-01-26 RX ORDER — NICOTINE POLACRILEX 4 MG
15-30 LOZENGE BUCCAL
Status: CANCELLED | OUTPATIENT
Start: 2023-01-26

## 2023-01-26 RX ORDER — PIPERACILLIN SODIUM, TAZOBACTAM SODIUM 3; .375 G/15ML; G/15ML
3.38 INJECTION, POWDER, LYOPHILIZED, FOR SOLUTION INTRAVENOUS EVERY 8 HOURS
Status: CANCELLED | OUTPATIENT
Start: 2023-01-26

## 2023-01-26 RX ORDER — SPIRONOLACTONE 25 MG/1
100 TABLET ORAL AT BEDTIME
Status: DISCONTINUED | OUTPATIENT
Start: 2023-01-26 | End: 2023-01-28

## 2023-01-26 RX ORDER — DEXTROSE MONOHYDRATE 25 G/50ML
INJECTION, SOLUTION INTRAVENOUS
Status: DISCONTINUED
Start: 2023-01-26 | End: 2023-01-26 | Stop reason: HOSPADM

## 2023-01-26 RX ORDER — PIPERACILLIN SODIUM, TAZOBACTAM SODIUM 3; .375 G/15ML; G/15ML
3.38 INJECTION, POWDER, LYOPHILIZED, FOR SOLUTION INTRAVENOUS EVERY 6 HOURS
Status: DISCONTINUED | OUTPATIENT
Start: 2023-01-26 | End: 2023-01-26

## 2023-01-26 RX ORDER — SODIUM CHLORIDE 9 MG/ML
INJECTION, SOLUTION INTRAVENOUS CONTINUOUS
Status: CANCELLED | OUTPATIENT
Start: 2023-01-26

## 2023-01-26 RX ORDER — DEXTROSE MONOHYDRATE 25 G/50ML
25-50 INJECTION, SOLUTION INTRAVENOUS
Status: DISCONTINUED | OUTPATIENT
Start: 2023-01-26 | End: 2023-01-29

## 2023-01-26 RX ORDER — ONDANSETRON 2 MG/ML
4 INJECTION INTRAMUSCULAR; INTRAVENOUS EVERY 6 HOURS PRN
Status: CANCELLED | OUTPATIENT
Start: 2023-01-26

## 2023-01-26 RX ADMIN — PANTOPRAZOLE SODIUM 40 MG: 40 INJECTION, POWDER, FOR SOLUTION INTRAVENOUS at 20:11

## 2023-01-26 RX ADMIN — DEXTROSE MONOHYDRATE 25 ML: 25 INJECTION, SOLUTION INTRAVENOUS at 03:55

## 2023-01-26 RX ADMIN — MAGNESIUM SULFATE HEPTAHYDRATE 2 G: 40 INJECTION, SOLUTION INTRAVENOUS at 05:39

## 2023-01-26 RX ADMIN — RIFAXIMIN 550 MG: 550 TABLET ORAL at 08:57

## 2023-01-26 RX ADMIN — PANTOPRAZOLE SODIUM 40 MG: 40 INJECTION, POWDER, FOR SOLUTION INTRAVENOUS at 00:39

## 2023-01-26 RX ADMIN — SODIUM CHLORIDE 8 MG/HR: 9 INJECTION, SOLUTION INTRAVENOUS at 05:47

## 2023-01-26 RX ADMIN — LACTULOSE 20 G: 10 POWDER, FOR SOLUTION ORAL at 20:11

## 2023-01-26 RX ADMIN — LACTULOSE 20 G: 10 POWDER, FOR SOLUTION ORAL at 08:57

## 2023-01-26 RX ADMIN — DEXTROSE AND SODIUM CHLORIDE: 5; 450 INJECTION, SOLUTION INTRAVENOUS at 05:31

## 2023-01-26 RX ADMIN — PIPERACILLIN AND TAZOBACTAM 3.38 G: 3; .375 INJECTION, POWDER, LYOPHILIZED, FOR SOLUTION INTRAVENOUS at 08:28

## 2023-01-26 RX ADMIN — CEFTRIAXONE SODIUM 1 G: 1 INJECTION, POWDER, FOR SOLUTION INTRAMUSCULAR; INTRAVENOUS at 14:05

## 2023-01-26 RX ADMIN — DEXTROSE MONOHYDRATE 25 ML: 25 INJECTION, SOLUTION INTRAVENOUS at 04:15

## 2023-01-26 RX ADMIN — FLUOXETINE HYDROCHLORIDE 60 MG: 40 CAPSULE ORAL at 21:53

## 2023-01-26 RX ADMIN — OCTREOTIDE ACETATE 50 MCG/HR: 200 INJECTION INTRAVENOUS at 05:43

## 2023-01-26 RX ADMIN — PIPERACILLIN AND TAZOBACTAM 3.38 G: 3; .375 INJECTION, POWDER, FOR SOLUTION INTRAVENOUS at 00:40

## 2023-01-26 RX ADMIN — POLYETHYLENE GLYCOL 3350, SODIUM SULFATE ANHYDROUS, SODIUM BICARBONATE, SODIUM CHLORIDE, POTASSIUM CHLORIDE 4000 ML: 236; 22.74; 6.74; 5.86; 2.97 POWDER, FOR SOLUTION ORAL at 16:35

## 2023-01-26 RX ADMIN — RIFAXIMIN 550 MG: 550 TABLET ORAL at 20:11

## 2023-01-26 RX ADMIN — LACTULOSE 20 G: 10 POWDER, FOR SOLUTION ORAL at 14:05

## 2023-01-26 ASSESSMENT — ACTIVITIES OF DAILY LIVING (ADL)
ADLS_ACUITY_SCORE: 49
NUMBER_OF_TIMES_PATIENT_HAS_FALLEN_WITHIN_LAST_SIX_MONTHS: 2
ADLS_ACUITY_SCORE: 45
ADLS_ACUITY_SCORE: 49
DRESSING/BATHING_DIFFICULTY: NO
CONCENTRATING,_REMEMBERING_OR_MAKING_DECISIONS_DIFFICULTY: YES
ADLS_ACUITY_SCORE: 38
ADLS_ACUITY_SCORE: 35
FALL_HISTORY_WITHIN_LAST_SIX_MONTHS: YES
VISION_MANAGEMENT: GLASSES
ADLS_ACUITY_SCORE: 45
DIFFICULTY_EATING/SWALLOWING: OTHER (SEE COMMENTS)
WALKING_OR_CLIMBING_STAIRS_DIFFICULTY: NO
CHANGE_IN_FUNCTIONAL_STATUS_SINCE_ONSET_OF_CURRENT_ILLNESS/INJURY: YES
ADLS_ACUITY_SCORE: 45
ADLS_ACUITY_SCORE: 43
DEPENDENT_IADLS:: CLEANING;COOKING;LAUNDRY;MEAL PREPARATION;MEDICATION MANAGEMENT;MONEY MANAGEMENT;TRANSPORTATION
ADLS_ACUITY_SCORE: 45
TRANSFERRING: 1-->ASSISTANCE (EQUIPMENT/PERSON) NEEDED
TRANSFERRING: 1-->ASSISTANCE (EQUIPMENT/PERSON) NEEDED (NOT DEVELOPMENTALLY APPROPRIATE)
WEAR_GLASSES_OR_BLIND: YES
ADLS_ACUITY_SCORE: 36
ADLS_ACUITY_SCORE: 43
TOILETING_ISSUES: NO
DOING_ERRANDS_INDEPENDENTLY_DIFFICULTY: YES
ADLS_ACUITY_SCORE: 45

## 2023-01-26 NOTE — ANESTHESIA POSTPROCEDURE EVALUATION
Patient: Dillon Salter    Procedure: Procedure(s):  ESOPHAGOGASTRODUODENOSCOPY (EGD)       Anesthesia Type:  MAC    Note:  Disposition: Inpatient   Postop Pain Control: Uneventful            Sign Out: Well controlled pain   PONV: No   Neuro/Psych: Uneventful            Sign Out: Acceptable/Baseline neuro status   Airway/Respiratory: Uneventful            Sign Out: Acceptable/Baseline resp. status   CV/Hemodynamics: Uneventful            Sign Out: Acceptable CV status; No obvious hypovolemia; No obvious fluid overload   Other NRE: NONE   DID A NON-ROUTINE EVENT OCCUR? No           Last vitals:  Vitals Value Taken Time   /67 01/25/23 1620   Temp 36.4  C (97.5  F) 01/25/23 1615   Pulse 97 01/25/23 1627   Resp     SpO2 97 % 01/25/23 1627   Vitals shown include unvalidated device data.    Electronically Signed By: Jos Haywood MD  January 26, 2023  9:50 AM

## 2023-01-26 NOTE — CONSULTS
"  Interventional Radiology - Consultation Note:  Inpatient - [facility]: Interventional Radiology   [phone]  1/26/2023    Reason for Consult:  Lower gastrointestinal hemorrhage, presumed rectal hemorrhoid.   Requesting Provider:  Dr. Tami Posada MD    HPI:  Dillon Salter is a 27 year old male with a complex past medical history including Asperger's, anxiety/depression, and alcoholic cirrhosis diagnosed 4/2022 with history of esophageal varices banded 5/2022, sober since 4/6/22, hepatic encephalopathy, thrombocytopenia undergoing liver transplant evaluation at the [facility].    The patient was admitted to [facility] on 1/21 with worsening hepatic encephalopathy (had stopped lactulose).  During this hospitalization he has had intermittent bleeding (gums, melanotic stools, hematemesis).  He has been seen by GI and underwent upper endoscopy on 1/21 revealing grade 1 esophageal varices and evidence of portal hypertensive gastropathy.  No identified source of active bleeding.    Given continued melena, a CTA was performed earlier tonight notable for dilated perirectal vessels on the left, \"concerning for bleeding hemorrhoids\".  No other suspicious site of active gastrointestinal hemorrhage.      Unfortunately overnight the patient developed significant bright red blood per rectum and Dr. Posada was alerted.  He is starting blood product resuscitation and paged to discuss if potential IR intervention was feasible.      IMAGING:  CTA abdomen and pelvis 1/25/2023:  1.  Dilated perirectal vessels with adjacent mural thickening of the rectum and a focal area of increased enhancement of the lumen of the rectum concerning for bleeding hemorrhoids, given the mural thickening which is asymmetric a rectal mass and/or   polyp may also be present in this region.   2.  Mild thickening of the distal esophagus, may reflect changes of reflux/GERD, improved when compared with 4/7/2022.  3.  Focal groundglass " opacity seen in the left lower lobe, which may reflect multifocal infectious process  4.  Hepatosplenomegaly, slightly improved from prior exam with interval decrease in size of the liver. Cirrhotic changes of liver again noted, similar to prior study.  5.  Layering biliary sludge within the gallbladder    NPO Status:  Not applicable  Anticoagulation/Antiplatelets/Bleeding tendencies:  Thrombocytopenic (liver disease)      PAST MEDICAL HISTORY:  Past Medical History:   Diagnosis Date     Acute on chronic anemia 04/08/2022     Alcohol use disorder      Alcoholic cirrhosis of liver with ascites (H)      Alcoholic hepatitis      Autism spectrum      Autism spectrum disorder 4/8/2022    High functioning autistic.  26-year-old history of autism/Asperger's on the spectrum graduated high school at [employer] worked at [employer] and then another  place until the Covid epidemic in 2020 lives with parents (Leticia and Wes) socially isolated drinks alcohol beer daily      Benign essential hypertension      Bleeding esophageal varices (H) 6/18/2022     Hepatic encephalopathy      Hyponatremia 7/28/2022     Major depressive disorder      Type II Diabetes        PAST SURGICAL HISTORY:  Past Surgical History:   Procedure Laterality Date     ESOPHAGOSCOPY, GASTROSCOPY, DUODENOSCOPY (EGD), COMBINED N/A 5/24/2022    Procedure: ESOPHAGOGASTRODUODENOSCOPY (EGD) with esophageal banding;  Surgeon: Gary Hurst MD;  Location: [facility] OR     ESOPHAGOSCOPY, GASTROSCOPY, DUODENOSCOPY (EGD), COMBINED N/A 6/20/2022    Procedure: ESOPHAGOGASTRODUODENOSCOPY;  Surgeon: Gavino Reza MD;  Location: [facility] OR     ESOPHAGOSCOPY, GASTROSCOPY, DUODENOSCOPY (EGD), COMBINED N/A 1/23/2023    Procedure: ESOPHAGOGASTRODUODENOSCOPY (EGD) with esophageal variceal banding;  Surgeon: Juan Boo MD;  Location: [facility] Main OR     MIDLINE DOUBLE LUMEN PLACEMENT  5/26/2022          PICC TRIPLE LUMEN PLACEMENT   7/28/2022            ALLERGIES:  No Known Allergies     MEDICATIONS:  Current Facility-Administered Medications   Medication     acetaminophen (TYLENOL) tablet 650 mg    Or     acetaminophen (TYLENOL) Suppository 650 mg     acetaminophen (TYLENOL) tablet 500 mg     bisacodyl (DULCOLAX) suppository 10 mg     bumetanide (BUMEX) tablet 1 mg     chlorhexidine (PERIDEX) 0.12 % solution 15 mL     glucose gel 15-30 g    Or     dextrose 50 % injection 25-50 mL    Or     glucagon injection 1 mg     glucose gel 15-30 g    Or     dextrose 50 % injection 25-50 mL    Or     glucagon injection 1 mg     FLUoxetine (PROzac) capsule 60 mg     folic acid (FOLVITE) tablet 1 mg     gabapentin (NEURONTIN) capsule 100 mg     ibuprofen (ADVIL/MOTRIN) tablet 400 mg     insulin aspart (NovoLOG) injection (RAPID ACTING)     insulin aspart (NovoLOG) injection (RAPID ACTING)     insulin glargine (LANTUS PEN) injection 16 Units     lactulose (CHRONULAC) solution 10 g     lidocaine (LMX4) cream     lidocaine 1 % 0.1-1 mL     melatonin tablet 1 mg     multivitamin, therapeutic (THERA-VIT) tablet 1 tablet     octreotide (sandoSTATIN) 1,250 mcg in D5W 250 mL     ondansetron (ZOFRAN ODT) ODT tab 4 mg    Or     ondansetron (ZOFRAN) injection 4 mg     pantoprazole (PROTONIX) IV push injection 40 mg     piperacillin-tazobactam (ZOSYN) 3.375 g vial to attach to  mL bag     polyethylene glycol (MIRALAX) Packet 17 g     rifaximin (XIFAXAN) tablet 550 mg     senna-docusate (SENOKOT-S/PERICOLACE) 8.6-50 MG per tablet 1 tablet    Or     senna-docusate (SENOKOT-S/PERICOLACE) 8.6-50 MG per tablet 2 tablet     sodium chloride (PF) 0.9% PF flush 3 mL     sodium chloride (PF) 0.9% PF flush 3 mL     sodium chloride 0.9% infusion     [Held by provider] spironolactone (ALDACTONE) tablet 100 mg     thiamine (B-1) tablet 100 mg        LABS:  INR   Date Value Ref Range Status   01/25/2023 1.83 (H) 0.85 - 1.15 Final      Hemoglobin   Date Value Ref Range Status    01/25/2023 6.7 (LL) 13.3 - 17.7 g/dL Final   ]  Platelet Count   Date Value Ref Range Status   01/25/2023 142 (L) 150 - 450 10e3/uL Final     Creatinine   Date Value Ref Range Status   01/25/2023 1.08 0.70 - 1.30 mg/dL Final     Potassium   Date Value Ref Range Status   01/25/2023 4.6 3.5 - 5.0 mmol/L Final     ASSESSMENT/PLAN:  27 year old male with alcoholic cirrhosis, encephalopathy and acute lower gastrointestinal hemorrhage, favored hemorrhoidal    Initial discussion 00:49.  Dr. Posada and I discussed that if lower hemorrhoids are the bleeding source this is typically not a region in which angiography/embolization is the first line treatment, although if there are no other options an angiogram certainly could be performed.  If we saw bleeding we could embolize. This would require transfer to either [facility] or the [facility].  We discussed repeating a CTA to search for a different source, although the clinical picture seems highly suggestive of hemorrhoidal bleeding.  Recommended reaching out to GI to see if sigmoidoscopy may be of benefit (possible banding?).      Repeat discussion 1:24:  Dr. Posada has spoken with GI and they recommended consideration of a potential TIPS.  Currently I do not believe this would be of benefit.  With his encephalopathy a TIPS would be essentially contraindicated and likely would have little impact on the rectal bleeding. MELD score is 30 points.  Given the complexity of the clinical picture we agreed transfer to a site with more robust IR services would be beneficial.  We also discussed reaching out to the [facility] for a 3rd opinion, as that is where Dillon typically receives his care. I told Dr. Posada I would review the chart/CT more closely to be sure we were not overlooking something, and call back once complete.    Repeat discussion 1:50: No new information or change in plan after reviewing the CTA and chart.  I did discuss with my partner,   Gregorysylvie who is covering [facility] and we agree TIPS is not the appropriate therapy here.  In the interim Dr. Posada has spoken to GI at the [facility] and the patient will be transferring.  I agree this is in the best interest of the patient, with further care per medicine, GI and IR at the [facility].      Dhiraj Mustafa MD  Interventional Radiology  [phone]

## 2023-01-26 NOTE — PROGRESS NOTES
Pt will likely require transfer to the [facility] or another facility for procedure. Phoned bed control x2 to put in requests for transfer to [facility] at this time.

## 2023-01-26 NOTE — PLAN OF CARE
Major Shift Events:   Neuro: A&Ox4, Neuros & CMS appear at baseline  Cardiac: NSR, pulses palpable, afebrile  Resp: lung sounds clear, sating > 92% RA   GI: persistent BRBPR (saturated brief x 2), incontinent, bowel sounds faint  : voiding spontaneously, incontinent   Pain/Nausea: denies  LDA: PIV x3 infusing IVMF, abx/lytes, octreotide, & protonix gtt   Diet: NPO  Labs: reviewed, Hgb 7.9 on admission, BG 61 - amp D50 given   Plan: IR consult, SW consult ordered to establish decision making, continue plan of care  For vital signs and complete assessments, please see documentation flowsheets.     Admitted/transferred from:  Hospital   Reason for admission/transfer: IR for BRBPR   2 RN skin assessment: completed by Ankita WEINBERG RN & Ena PARADA RN   Result of skin assessment and interventions/actions: moisture irritation beneath pannus & groin folds (barrier cream applied), gen bruising, jaundiced, mepi on coccyx   Height, weight, drug calc weight: Done  Patient belongings (see Flowsheet)  MDRO education added to care plan N/A  ?

## 2023-01-26 NOTE — PLAN OF CARE
Occupational Therapy Discharge Summary    Reason for therapy discharge:    Discharged to [facility].    Progress towards therapy goal(s). See goals on Care Plan in [facility] electronic health record for goal details.  Goals partially met.  Barriers to achieving goals:   discharge from facility.    Therapy recommendation(s):    Continued therapy is recommended.  Rationale/Recommendations:  home care to increase strength and activity tolerance for ADLs.

## 2023-01-26 NOTE — PLAN OF CARE
Goal Outcome Evaluation:         Problem: Plan of Care - These are the overarching goals to be used throughout the patient stay.    Goal: Plan of Care Review  Description: The Plan of Care Review/Shift note should be completed every shift.  The Outcome Evaluation is a brief statement about your assessment that the patient is improving, declining, or no change.  This information will be displayed automatically on your shift note.  Outcome: Not Progressing         Pt A/Ox4, able to make needs known. Pt had 4 large bloody stools. CTA showed varices of rectum. Platelets transfused. Hgb of 6.7 @ 2200, 1 unit of RBC transfused, 1 unit started. Hemodynamically stable, on room air and slightly tachy. Transfer to [facility] at approx 0300 for TIPS procedure.

## 2023-01-26 NOTE — PHARMACY-ADMISSION MEDICATION HISTORY
Please see Admission Medication History note completed by pharmacy intern, Ryan Garcia, on 1/21/2023 under previous encounter at [facility] for information regarding prior to admission medications.     Mile Ochoa RPH on 1/26/2023 at 1:25 PM

## 2023-01-26 NOTE — PROGRESS NOTES
[facility]    Transfer Acceptance Note - Medicine Service, DANIELLA TEAM 1       Date of Admission:  1/26/2023    Assessment & Plan   Dillon Satler is a 27 year old male admitted on 1/26/2023. He is a 27 year old male with PMH of Asperger's, alcoholic cirrhosis (diagnosed 4/2022), esophageal varices (banded 5/2022), and hepatic encephalopathy currently undergoing liver transplant evaluation at [facility] who was transferred from [facility] on 1/26/23 for lower GI bleed 2/2 rectal varices vs hemorrhoids requiring advanced GI intervention.      Changes Today:  -Transferred to Medicine today   -Flex Sigmoid tomorrow - NPO at midnight       # Decompensated alcoholic cirrhosis  # Esophageal varices s/p banding  # Bleeding rectal hemorrhoids vs. Varices  # Hepatic Encephalopathy  Patient transferred to from [facility] to [facility] for lower GI bleed likely 2/2 rectal hemorrhoids requiring advanced GI intervention. CTA 1/25 showed dilated perirectal vessels concerning for bleeding hemorrhoids. Hgb decreased from 8.2 to 6.7 requiring 2 units pRBCs --> improved to 7.9 on admission. Patient is undergoing liver transplant eval at [facility] (Dr. Wood). Per chart review, has not had alcohol since 4/6/22. Patient was treated for hepatic encephalopathy at [facility] from 1/21-1/26 with improving mentation per chart review. On admission, patient is alert and oriented x 3. Mild asterixis on exam.   - GI Consulted: rectal bleeding, clears okay for now, golytely this evening, NPO aftermidnight  - Hgb, platelet and fibrinogen q 8 hours  - Cont Octreotide   - Cont PPI BID  - HOLD PTA spironolactone given NPO, monitor BP  - Rifaximin 550 mg BID  - Continue Lactulose 20g TID to 3 BMs daily       MELD-Na score: 25 at 1/26/2023  9:13 AM  MELD score: 22 at 1/26/2023  9:13 AM  Calculated from:  Serum Creatinine: 0.87 mg/dL (Using min of 1 mg/dL) at 1/26/2023  9:13 AM  Serum Sodium: 132 mmol/L at  1/26/2023  9:13 AM  Total Bilirubin: 10.3 mg/dL at 1/26/2023  9:13 AM  INR(ratio): 1.87 at 1/26/2023  4:04 AM  Age: 27 years     #Leukocytosis   Patient had upward trending WBC since 1/21 to 18.0, now down to 15.1 on admission. Outside hospital started Zosyn on 1/25 given leukocytosis. NGTD on blood cultures. CTA 1/25 showed focal ground glass opacity in left lower lobe which could possibly reflect infectious process. However, lower suspicion given Spo2 in mid-high 90s on RA, no other evidence of respiratory infection.   - Cont Zosyn (1/25-1/26)  - Stop zosyn, start ceftriaxone    # Acute on Chronic Anemia  # Thrombocytopenia  Hgb down to 6.2 1/25 due to bleeding from rectum likely 2/2 hemorrhoids vs. rectal varices. Hgb improved to 7.9 s/p 2 units pRBCs. Continue to monitor for signs of bleeding.   - Q8h Hgb checks     #Systolic Murmur  Found on Exam today. Echo on 7/2022 w/o valvular findings. HDS.  -Echo ordered     # Hyponatremia, improved  Hyponatremia to 129, likely 2/2 liver cirrhosis.   -CTM     # Hypomagnesemia  1.3 on admission  -2 g Mag ordered, monitor  - Electrolyte protocol     # T2DM  BG decreased to 65 on arrival. Last A1c 5.4 in 7/2022.   - Started D5 0.45% NS given NPO  - hypoglycemia protocol  - LDSSI       #Anxiety/depression   -Cont PTA Fluoxetine            Diet: NPO per Anesthesia Guidelines for Procedure/Surgery Except for: Meds, Ice Chips  Clear Liquid Diet  NPO for Medical/Clinical Reasons Except for: Meds    DVT Prophylaxis: Pneumatic Compression Devices  Negron Catheter: Not present  Fluids: PO  Lines: None     Cardiac Monitoring: ACTIVE order. Indication: ICU  Code Status: Full Code      Clinically Significant Risk Factors Present on Admission         # Hyponatremia: Lowest Na = 126 mmol/L in last 2 days, will monitor as appropriate    # Hypomagnesemia: Lowest Mg = 1.3 mg/dL in last 2 days, will replace as needed   # Hypoalbuminemia: Lowest albumin = 2.8 g/dL at 1/26/2023  4:04 AM, will  monitor as appropriate  # Coagulation Defect: INR = 1.87 (Ref range: 0.85 - 1.15) and/or PTT = 41 Seconds (Ref range: 22 - 38 Seconds), will monitor for bleeding  # Thrombocytopenia: Lowest platelets = 111 in last 2 days, will monitor for bleeding     # Acute Respiratory Failure: Documented O2 saturation < 91%.  Continue supplemental oxygen as needed     # Obesity: Estimated body mass index is 30.14 kg/m  as calculated from the following:    Height as of this encounter: 1.524 m (5').    Weight as of this encounter: 70 kg (154 lb 5.2 oz).           Disposition Plan      Expected Discharge Date: 01/28/2023      Destination: home with family;home with help/services          The patient's care was discussed with the Attending Physician, Dr. Carrasco.    German Velez Reyes, MD, PhD  Medicine Service, [address]  Securely message with [employer] (more info)  Text page via [facility] Paging/Directory   See signed in provider for up to date coverage information  ______________________________________________________________________    Interval History   Transferred from ICU to Medicine.     Mr Salter says that he is feeling well this morning. He does not have any major complaints. Denies chest pain, lightheadedness, nausea.     As per  note: 'Pt lives with his mother and father in a house in [place], [place]. Pt is independent with personal cares/ADLs but does receive assistance w/ several IADLs including medication management. Pt has not worked for a few years. Pt's primary support are his parents, but he also has some friends as well. Pt has abstained from alcohol since April 2022'.    Physical Exam   Vital Signs: Temp: 98.1  F (36.7  C) Temp src: Oral BP: 113/73 Pulse: 78   Resp: 18 SpO2: 100 % O2 Device: Nasal cannula Oxygen Delivery: 2 LPM  Weight: 154 lbs 5.15 oz    GEN: Lethargic but awakens easily, not in distress, icteric appearing   EYES: PERRL, Anicteric sclera.    HEENT:  Normocephalic, atraumatic, trachea midline, Pupils PERRLA  CV: RRR, no gallops, rubs, or murmurs  PULM/CHEST: Clear breath sounds bilaterally without rhonchi, crackles or wheeze, symmetric chest rise  GI: Large abdomen, normal bowel sounds, soft, non-tender, no rebound tenderness or guarding, no masses, denies any pain.  EXTREMITIES: No peripheral edema, warm and dry, moving all extremities, peripheral pulses intact  NEURO: No motor-sensory deficits noted, alert and orientated x 4.   SKIN: No rashes, sores or ulcerations  PSYCH:  Affect: appropriate      Medical Decision Making       Please see A&P for additional details of medical decision making.      Data     I have personally reviewed the following data over the past 24 hrs:    14.9 (H)  \   8.5 (L)   / 111 (L)     132 (L) 98 19.8 /  123 (H)   3.7 25 0.87 \       ALT: 26 AST: 63 (H) AP: 84 TBILI: 10.3 (H)   ALB: 3.0 (L) TOT PROTEIN: 5.7 (L) LIPASE: N/A       Procal: N/A CRP: N/A Lactic Acid: 1.0       INR:  1.87 (H) PTT:  41 (H)   D-dimer:  N/A Fibrinogen:  148 (L)       Imaging results reviewed over the past 24 hrs:   No results found for this or any previous visit (from the past 24 hour(s)).

## 2023-01-26 NOTE — PROGRESS NOTES
"SPIRITUAL HEALTH SERVICES Progress Note  [facility] 4A    Saw pt Dillon Salter per admission request.      Patient/Family Understanding of Illness and Goals of Care - Dillon expressed that \"I know I'm not dying, and they know I'm not dying\" when asked how he is doing.      Distress and Loss - Dillon shared that he has lost family members \"who are now in heaven.\"      Strengths, Coping, and Resources - Dillon identified his family as his primary source of support and encouragement. He is close with particularly his parents and his uncle. Dillon also feels that \"I have many people pulling for me\" both living and in heaven.       Meaning, Beliefs, and Spirituality - Dillon named \"having come to a new relationship with God\" through this experience. He articulated a desire to begin attending mass and have a connection to a [employer]. Dillon believes in the love and presence of God and a communion of holy witnesses that give him strength and meaning.      Plan of Care - I offered Dillon supportive presence, attentive listening, he and each prayed with and for each other per his request. He was open to follow up [facility] visits which I will provide as available.     Canelo Bear  Chaplain Resident  Pager [phone]    * [facility] remains available 24/7 for emergent requests/referrals, either by having the switchboard page the on-call  or by entering an ASAP/STAT consult in Epic (this will also page the on-call ). Routine Epic consults receive an initial response within 24 hours.*    "

## 2023-01-26 NOTE — CONSULTS
GASTROENTEROLOGY CONSULTATION    Date of Admission:  1/26/2023       ASSESSMENT AND RECOMMENDATIONS:   27 year old male with Asperger's, DM2, depression, alcohol related cirrhosis c/b hepatic encephalopathy, history of esophageal varices bleeding (5/2022), ascites, who was admitted at [facility] 1/21/23 with hepatic encephalopathy, then developed hematochezia underwent EGD with non-bleeding EV s/p banded, then continue to have hematochezia with CTA showing bleeding from rectum, transferred to [facility] 1/26 for further management of rectal bleeding.    # Hematochezia with CTA positive for rectal bleeding  # Post hemorrhagic anemia  Hgb 8-9 at baseline and down to ~7 with requiring 4 PRC and 1 plt this admission. No hypotension. Patient had  2 EGDs this admission, with no active bleeding on most recent EGD 1/25/23. CTA with bleeding from rectum area, this could be from hemorrhoid which could be severe in cirrhosis with portal hypertension, vs rectal varices. Patient never has colonoscopy or flexible sigmoidoscopy which is needed for the next step. If this is rectal varices, could consider glue injection vs TIPS (though, high MELD and encephalopathy would be very high risk for decompensation post TIPS).     # Decompensated cirrhosis with hepatic encephalopathy  MELD-Na 27  Etiology: Alcohol  EV: esophageal varices s/p banded 1/23/22  Ascites: home meds on bumex 1 mg daily and spironolactone 100 mg daily, not adequate fluid to tap, no prior SBP  Liver transplant candidacy: Last drink 4/2022, follows with DR. Wood.  Ongoing liver transplant eval outpatient :  - Chemical dependency assessment and programming, ongoing  - CT angiogram, scheduled 4/2023  - PFTs given history of tobacco use  - Endocrine referral given poor diabetes control, scheduled 4/2023  - Mental health referral, ongoing    Hepatic encephalopathy: Admitted with encephalopathy with nonadherent to medication and bleeding. No infection. No longer has  encephalopathy at this time.     RECOMMENDATIONS  - Plan for EGD/flexible sigmoidoscopy with rectal EUS with Dr. Montoya tomorrow 1/27/23   - Will do full colonoscopy given need for rectal EUS with water insufflation (colon has to be clear from bowel movement)   - Clear liquid diet now, NPO after midnight, Golytely 4 L po at 5 pm 1/26/23  - Continue ceftriaxone IV 5 days after upper GI bleeding to prevent infection  - Continue IV PPI BID and IV octreotide for now until EGD tomorrow  - transfuse as needed  - Continue lactulose TID, titrate to 3 BM and rifaximin    Gastroenterology follow up recommendations: follows with Dr. Wood    Thank you for involving us in this patient's care. Please do not hesitate to contact the GI service with any questions or concerns.     Patient care plan discussed with Dr. Nelson, GI staff physician.    Lita Vences MD  GI fellow  Page [phone]          Chief Complaint:   We were asked to evaluate this patient with alcohol cirrhosis, rectal varices bleeding  History is obtained from the patient and the medical record.          History of Present Illness:   Dillon Salter is a 27 year old male with Asperger's, DM2, depression, alcohol related cirrhosis c/b hepatic encephalopathy, history of esophageal varices bleeding (5/2022), ascites, who was admitted at [facility] 1/21/23 with hepatic encephalopathy, then developed hematochezia underwent EGD with non-bleeding EV s/p banded, then continue to have hematochezia with CTA showing bleeding from rectum, transferred to [facility] 1/26 for further management of rectal bleeding.    Patient was admitted at [facility] 1/21/23 with hepatic encephalopathy after noted stopped lactulose for several weeks, and stopped all medications for 48 hours. Diagnostic paracentesis ordered but not enough fluid present. In the morning of 1/23, patient had bleeding gums, and 5 episodes of hematemesis. Underwent EGD 1/23/23 showed esophageal varices grade 2 with  red adiel sign s/p banded x3, portal hypertensive gastropathy, oozing blood, normal duodenum. Continue to have melena afterward, then 1/24 overnight had hematochezia. Underwent repeat EGD 1/25/23 showed active bleeding from gum, a band, not actively bleeding EV grade 1, PHG. He later developed large amount of hematochezia overnight and with CTA positive for rectal bleeding, he is transferred for [facility]. Overnight had 3 BM with last bowel movement with blood and blood clot this morning 6.30 am. No vomiting blood. No abdominal pain, not confused.    Hgb 8-9 at baseline and down to ~7 with requiring 4 PRC and 1 plt this admission. No hypotension.     He is seen by Dr. Wood, last seen 12/20/22 for alcohol cirrhosis and start liver transplant evaluation. He is enrolled in chem dep program.   Prior endoscopy:   - EGD 5/2022 with bleeding, Large esophageal varix from 30-39 cm.  Blood tinged material in esophagus and proximal stomach.  3 bands placed on the varix in this distribution.  - EGD 6/20/22 follow-up Portal hypertensive gastropathy.                          - Grade I varices with healing banding ulcers.          Past Medical History:   Reviewed and edited as appropriate  Past Medical History:   Diagnosis Date    Acute on chronic anemia 04/08/2022    Alcohol use disorder     Alcoholic cirrhosis of liver with ascites (H)     Alcoholic hepatitis     Autism spectrum     Autism spectrum disorder 4/8/2022    High functioning autistic.  26-year-old history of autism/Asperger's on the spectrum graduated high school at [employer] worked at [employer] and then another  place until the Covid epidemic in 2020 lives with parents (Leticia and Wes) socially isolated drinks alcohol beer daily     Benign essential hypertension     Bleeding esophageal varices (H) 6/18/2022    Hepatic encephalopathy     Hyponatremia 7/28/2022    Major depressive disorder     Type II Diabetes             Past Surgical History:   Reviewed and edited as  appropriate   Past Surgical History:   Procedure Laterality Date    ESOPHAGOSCOPY, GASTROSCOPY, DUODENOSCOPY (EGD), COMBINED N/A 5/24/2022    Procedure: ESOPHAGOGASTRODUODENOSCOPY (EGD) with esophageal banding;  Surgeon: Gary Hurst MD;  Location: [facility] Main OR    ESOPHAGOSCOPY, GASTROSCOPY, DUODENOSCOPY (EGD), COMBINED N/A 6/20/2022    Procedure: ESOPHAGOGASTRODUODENOSCOPY;  Surgeon: Gavino Reza MD;  Location: [facility] Main OR    ESOPHAGOSCOPY, GASTROSCOPY, DUODENOSCOPY (EGD), COMBINED N/A 1/23/2023    Procedure: ESOPHAGOGASTRODUODENOSCOPY (EGD) with esophageal variceal banding;  Surgeon: Juan Boo MD;  Location: [facility] Main OR    MIDLINE DOUBLE LUMEN PLACEMENT  5/26/2022         PICC TRIPLE LUMEN PLACEMENT  7/28/2022                 Social History:   Reviewed and edited as appropriate  Social History     Socioeconomic History    Marital status: Single     Spouse name: Not on file    Number of children: Not on file    Years of education: Not on file    Highest education level: Not on file   Occupational History    Not on file   Tobacco Use    Smoking status: Former    Smokeless tobacco: Never    Tobacco comments:     A pack lasted a year, hardly smoked   Vaping Use    Vaping Use: Former   Substance and Sexual Activity    Alcohol use: Not Currently     Comment: No ETOH since 4/6/22    Drug use: Not Currently     Types: Marijuana    Sexual activity: Not on file   Other Topics Concern    Not on file   Social History Narrative    26-year-old history of autism/Asperger's on the spectrum graduated high school at [employer] worked at  and then another  place until the Covid epidemic in 2020 lives with parents (Leticia and Wes) socially isolated drinks alcohol beer daily        End-stage cirrhosis noted on admission to  April 2022     Social Determinants of Health     Financial Resource Strain: Not on file   Food Insecurity: Not on file   Transportation Needs: Not on  file   Physical Activity: Not on file   Stress: Not on file   Social Connections: Not on file   Intimate Partner Violence: Not on file   Housing Stability: Not on file            Family History:   Reviewed and edited as appropriate  No known history of gastrointestinal/liver disease or  gastrointestinal malignancies       Allergies:   Reviewed and edited as appropriate   No Known Allergies         Medications:     Medications Prior to Admission   Medication Sig Dispense Refill Last Dose    acetaminophen (TYLENOL) 500 MG tablet Take 500 mg by mouth daily as needed for pain       bumetanide (BUMEX) 1 MG tablet Take 1 tablet (1 mg) by mouth daily 60 tablet 5     FLUoxetine (PROZAC) 20 MG capsule Take 60 mg by mouth At Bedtime       folic acid (FOLVITE) 1 MG tablet Take 1 tablet (1 mg) by mouth daily 90 tablet 1     gabapentin (NEURONTIN) 100 MG capsule Take 100 mg by mouth daily as needed       ibuprofen (ADVIL/MOTRIN) 200 MG tablet Take 400 mg by mouth daily as needed for pain       insulin aspart (NOVOLOG FLEXPEN) 100 UNIT/ML pen 1 unit per 10 grams of carb, plus additional units based on following scale   For Pre-Meal  - 164 give 1 unit. For Pre-Meal  - 189 give 2 units. For Pre-Meal  - 214 give 3 units. For Pre-Meal  - 239 give 4 units. For Pre-Meal  - 264 give 5 units. For Pre-Meal  - 289 give 6 units. For Pre-Meal  - 314 give 7 units. For Pre-Meal  - 339 give 8 units. For Pre-Meal  - 364 give 9 units.  For Pre-Meal BG greater than or equal to 365 give 10 units (Patient taking differently: 1-10 Units 1 unit per 10 grams of carb, plus additional units based on following scale   For Pre-Meal  - 164 give 1 unit. For Pre-Meal  - 189 give 2 units. For Pre-Meal  - 214 give 3 units. For Pre-Meal  - 239 give 4 units. For Pre-Meal  - 264 give 5 units. For Pre-Meal  - 289 give 6 units. For Pre-Meal  - 314 give 7 units. For  Pre-Meal  - 339 give 8 units. For Pre-Meal  - 364 give 9 units.  For Pre-Meal BG greater than or equal to 365 give 10 units) 15 mL 3     insulin glargine (LANTUS PEN) 100 UNIT/ML pen Inject 20 Units Subcutaneous 2 times daily 15 mL 0     lactulose (CHRONULAC) 10 GM/15ML solution Take 15-30 mLs (10-20 g) by mouth 2 times daily (Patient taking differently: Take 10-20 g by mouth 2 times daily as needed) 1800 mL 11     multivitamin, therapeutic (THERA-VIT) TABS tablet Take 1 tablet by mouth in the morning.       pantoprazole (PROTONIX) 40 MG EC tablet Take 1 tablet (40 mg) by mouth daily 30 tablet 3     rifaximin (XIFAXAN) 550 MG TABS tablet Take 1 tablet (550 mg) by mouth 2 times daily 60 tablet 0     spironolactone (ALDACTONE) 100 MG tablet Take 100 mg by mouth At Bedtime 60 tablet 5     thiamine (B-1) 100 MG tablet Take 1 tablet (100 mg) by mouth in the morning. 30 tablet 0              Review of Systems:     A complete review of systems was performed and is negative except as noted in the HPI           Physical Exam:   BP (!) 134/104   Pulse 89   Temp 97.9  F (36.6  C) (Oral)   Resp 20   Ht 1.524 m (5')   Wt 70 kg (154 lb 5.2 oz)   SpO2 99%   BMI 30.14 kg/m    Wt:   Wt Readings from Last 2 Encounters:   01/26/23 70 kg (154 lb 5.2 oz)   01/22/23 66.3 kg (146 lb 3.2 oz)      Constitutional: no acute distress  HEENT: Sclera icteric  CV: No edema  Respiratory: Unlabored breathing  Abdomen: Non-distended, nontender, no peritoneal signs  Skin: warm, perfused  Neuro: AAO x 4, No asterixis  Bed sheet, blood with blood clot, no melena         Data:   Labs and imaging below were independently reviewed and interpreted    Imaging: Reviewed.  CTA 1/26/23  1.  Dilated perirectal vessels with adjacent mural thickening of the rectum and a focal area of increased enhancement of the lumen of the rectum concerning for bleeding hemorrhoids, given the mural thickening which is asymmetric a rectal mass and/or   polyp  may also be present in this region.   2.  Mild thickening of the distal esophagus, may reflect changes of reflux/GERD, improved when compared with 4/7/2022.  3.  Focal groundglass opacity seen in the left lower lobe, which may reflect multifocal infectious process  4.  Hepatosplenomegaly, slightly improved from prior exam with interval decrease in size of the liver. Cirrhotic changes of liver again noted, similar to prior study.  5.  Layering biliary sludge within the gallbladder    Attestation:  This patient has been seen and evaluated by me, Viola Nelson.  Discussed with the house staff team or resident(s) and agree with the findings and plan in this note.

## 2023-01-26 NOTE — SIGNIFICANT EVENT
Significant Event Note - rectal bleeding     Time of event: 12:45 AM January 26, 2023    Multiple episodes of rectal bleeding - currently soaked with blood  No bleeding     CTA -- 1.  Dilated perirectal vessels with adjacent mural thickening of the rectum and a focal area of increased enhancement of the lumen of the rectum concerning for bleeding hemorrhoids, given the mural thickening which is asymmetric a rectal mass and/or polyp may also be present in this region.        S/p platelets  PRBC on going --- Hgb of 6.7      A - LGIB, rectal bleeding, hemorrhoids or bleeding from rectal varices    Plans  GI cons - case d/w at 1240A  IR cons    1AM - .  I did talk to Dr Mustafa from IR and said, that GI treats bleeding from rectal varices. Per Dr Lagunas from GI -- Patient needs TIPS and need to be transferred    2A - Did talk to ICU and GI on call (Dr Wood) from [facility] - they accepted the patient for further eval. Did not feel TIPS is appropriate. GI on call -- knows the patient as patient has been in the transplant clinic.

## 2023-01-26 NOTE — PROGRESS NOTES
[facility]    ICU Progress Note       Date of Admission:  1/26/2023    Assessment: Critical Care   Dillon Salter is a 27 year old male admitted on 1/26/2023. He is a 27 year old male with PMH of Asperger's, alcoholic cirrhosis (diagnosed 4/2022), esophageal varices (banded 5/2022), and hepatic encephalopathy currently undergoing liver transplant evaluation at [facility] who was transferred from [facility] on 1/26/23 for lower GI bleed 2/2 rectal varices vs hemorrhoids requiring advanced GI intervention. Patient is stable with Hgb improved to 7.9 s/p 2 units pRBCs.       Major events and changes today:    - GI to see, re: rectal bleeding, clears okay for now, golytely this evening, NPO aftermidnight  - Peth  - Stop zosyn, start ceftriaxone  - q8 hour hgb, platelet, fibrinogen  - Start electrolyte replacement protocol        Plan: Critical Care         Neuro:  # Pain  Patient denying pain.      Pulmonary:  No acute concerns     Cardiovascular:  No acute concerns     GI/Nutrition:  # Decompensated alcoholic cirrhosis  # Esophageal varices s/p banding  # Bleeding rectal hemorrhoids vs. Varices  Patient transferred to from [facility] to [facility] for lower GI bleed likely 2/2 rectal hemorrhoids requiring advanced GI intervention. CTA 1/25 showed dilated perirectal vessels concerning for bleeding hemorrhoids. Hgb decreased from 8.2 to 6.7 requiring 2 units pRBCs --> improved to 7.9 on admission. Patient is undergoing liver transplant eval at [facility] (Dr. Wood). Per chart review, has not had alcohol since 4/6/22.     - GI Consulted: rectal bleeding, clears okay for now, golytely this evening, NPO aftermidnight  - Hgb, platelet and fibrinogen q 8 hours  - Cont Octreotide   - Cont PPI BID  - HOLD PTA spironolactone given NPO, monitor BP  - Rifaximin 550 mg BID  - Continue Lactulose 20g TID     MELD-Na score: 30 at 1/25/2023  8:31 AM  MELD score: 24 at 1/25/2023  8:31  AM  Calculated from:  Serum Creatinine: 1.08 mg/dL at 1/25/2023  8:31 AM  Serum Sodium: 126 mmol/L at 1/25/2023  8:31 AM  Total Bilirubin: 14.0 mg/dL at 1/25/2023  8:31 AM  INR(ratio): 1.83 at 1/25/2023  8:31 AM  Age: 27 years     # Hepatic Encephalopathy  Patient was treated for hepatic encephalopathy at [facility] from 1/21-1/26 with improving mentation per chart review. On admission, patient is alert and oriented x 3. Mild asterixis on exam.   - Cont lactulose 20 grams TID   - Cont Rifaximin     Renal/Fluids/Electrolytes:  # Hyponatremia  Hyponatremia to 129, likely 2/2 liver cirrhosis.   -D5 .45NS     # Hypomagnesemia  1.3 on admission  -2 g Mag ordered, monitor  - Electrolyte protocol     Endocrine:  # T2DM  BG decreased to 65 on arrival. Last A1c 5.4 in 7/2022.   - Started D5 0.45% NS given NPO  - hypoglycemia protocol  - LDSSI        ID:  #Leukocytosis   Patient had upward trending WBC since 1/21 to 18.0, now down to 15.1 on admission. Outside hospital started Zosyn on 1/25 given leukocytosis. NGTD on blood cultures. CTA 1/25 showed focal ground glass opacity in left lower lobe which could possibly reflect infectious process. However, lower suspicion given Spo2 in mid-high 90s on RA, no other evidence of respiratory infection.   - Cont Zosyn (1/25-1/26)  - Stop zosyn, start ceftriaxone        Hematology:    # Acute on Chronic Anemia  # Thrombocytopenia  Hgb down to 6.2 1/25 due to bleeding from rectum likely 2/2 hemorrhoids vs. rectal varices. Hgb improved to 7.9 s/p 2 units pRBCs. Continue to monitor for signs of bleeding.   - Daily CBC     Musculoskeletal:  No acute concerns.      Skin:  No acute concerns.      Psych:   #Anxiety/depression   -Cont PTA Fluoxetine         General Cares/Prophylaxis:    DVT Prophylaxis: Pneumatic Compression Devices  GI Prophylaxis: PPI  Restraints: None  Family Communication: Mother on phone  Code Status: Full Code     Lines/tubes/drains:  - PIV     Disposition:  - Medical ICU    Lines/ tubes/ drains:  Negron Catheter: Not present  Lines: None       Prophylaxis:  - DVT Prophylaxis: VTE Prophylaxis contraindicated due to bleeding  - PUD Prophylaxis: protonix    Code Status: Full Code      Disposition:  - ICU, tx to medicine    The patient's care was discussed with the Attending Physician, Dr. Arias.  Critical care time exclusive of procedures: 43 minutes    VAMSHI Abebe [facility]  Securely message with [employer] (more info)  Text page via [facility] Paging/Directory     Clinically Significant Risk Factors Present on Admission         # Hyponatremia: Lowest Na = 126 mmol/L in last 2 days, will monitor as appropriate    # Hypomagnesemia: Lowest Mg = 1.3 mg/dL in last 2 days, will replace as needed   # Hypoalbuminemia: Lowest albumin = 2.8 g/dL at 1/26/2023  4:04 AM, will monitor as appropriate  # Coagulation Defect: INR = 1.87 (Ref range: 0.85 - 1.15) and/or PTT = 41 Seconds (Ref range: 22 - 38 Seconds), will monitor for bleeding         # Obesity: Estimated body mass index is 30.14 kg/m  as calculated from the following:    Height as of this encounter: 1.524 m (5').    Weight as of this encounter: 70 kg (154 lb 5.2 oz).                 ______________________________________________________________________    Interval History   Pt admitted overnight, continues to have bright red blood from rectum per nurse large amount in brief x 2. Pt alert and orientated on exam but lethargic. VSS.    Physical Exam   Vital Signs: Temp: 97.9  F (36.6  C) Temp src: Oral BP: (!) 134/104 Pulse: 89   Resp: 20 SpO2: 99 % O2 Device: None (Room air) Oxygen Delivery: 2 LPM  Weight: 154 lbs 5.15 oz    GEN: Lethargic but awakens easily, not in distress  EYES: PERRL, Anicteric sclera.   HEENT:  Normocephalic, atraumatic, trachea midline, Pupils PERRLA  CV: RRR, no gallops, rubs, or murmurs  PULM/CHEST: Clear breath sounds bilaterally without rhonchi, crackles or  wheeze, symmetric chest rise  GI: Large abdomen, normal bowel sounds, soft, non-tender, no rebound tenderness or guarding, no masses, denies any pain.  : exam deferred  EXTREMITIES: No peripheral edema, warm and dry, moving all extremities, peripheral pulses intact  NEURO: No motor-sensory deficits noted, alert and orientated x 4.   SKIN: No rashes, sores or ulcerations  PSYCH:  Affect: appropriate        Data   I reviewed all medications, new labs and imaging results over the last 24 hours.  Arterial Blood Gases   No lab results found in last 7 days.    Complete Blood Count   Recent Labs   Lab 01/26/23  0404 01/25/23  2208 01/25/23  1407 01/25/23  0831 01/24/23  1854 01/24/23  0809 01/23/23  0856   WBC 15.1*  --   --  18.0*  --  15.0* 12.8*   HGB 7.9* 6.7* 7.6* 8.1*   < > 6.2* 8.2*   *  --   --  142*  --  142* 147*    < > = values in this interval not displayed.       Basic Metabolic Panel  Recent Labs   Lab 01/26/23  0530 01/26/23  0411 01/26/23  0404 01/26/23  0349 01/25/23  1218 01/25/23  0831 01/24/23  1330 01/24/23  0809 01/22/23  1146 01/22/23  1002   NA  --   --  129*  --   --  126*  --  132*  --  132*   POTASSIUM  --   --  4.0  --   --  4.6  --  4.9  --  4.1   CHLORIDE  --   --  95*  --   --  92*  --  96*  --  95*   CO2  --   --  24  --   --  26  --  24  --  24   BUN  --   --  22.2*  --   --  29*  --  32*  --  28*   CR  --   --  0.95  --   --  1.08  --  0.89  --  0.97   * 93 183* 61*   < > 259*   < > 150*   < > 175*    < > = values in this interval not displayed.       Liver Function Tests  Recent Labs   Lab 01/26/23  0404 01/25/23  0831 01/24/23  1047 01/24/23  0809 01/21/23  0754   AST 66* 53*  --  60* 74*   ALT 26 35  --  30 39   ALKPHOS 70 98  --  102 132*   BILITOTAL 8.9* 14.0*  --  11.0* 11.9*   ALBUMIN 2.8* 3.0*  --  2.9* 3.3*   INR 1.87* 1.83* 2.00*  --  1.54*       Pancreatic Enzymes  No lab results found in last 7 days.    Coagulation Profile  Recent Labs   Lab 01/26/23  0408  01/25/23  0831 01/24/23  1047 01/21/23  0754   INR 1.87* 1.83* 2.00* 1.54*   PTT 41*  --  46*  --        IMAGING:  Recent Results (from the past 24 hour(s))   XR Chest Port 1 View    Narrative    EXAM: XR CHEST PORT 1 VIEW  LOCATION: [facility]  DATE/TIME: 1/25/2023 10:33 AM    INDICATION: white count, fever  COMPARISON: 12/22/2022      Impression    IMPRESSION: Lungs are clear. No pleural effusion. Heart size and pulmonary vascularity within normal limits.   CTA Abdomen Pelvis with Contrast   Result Value    Radiologist flags (Urgent)     Dilated perirectal vessels with adjacent mural thickening of the rectum and a focal area of increased enhancement of the lumen of the rectum concerning for bleeding hemorrhoids, given the mural thickening which is asymmetric a rectal mass and/or polyp   may also be present in this region.      Narrative    EXAM: CTA ABDOMEN PELVIS WITH CONTRAST  LOCATION: [facility]  DATE/TIME: 1/25/2023 8:25 PM    INDICATION: Melena, cirrhosis, active oral bleeding with no other source on EGD. CTA abdom to evaluate other cause source  COMPARISON: Prior study dated 4/7/2022  TECHNIQUE: CT angiogram abdomen pelvis during arterial phase of injection of IV contrast. 2D and 3D MIP reconstructions were performed by the CT technologist. Dose reduction techniques were used.  CONTRAST: ISOVUE 370 90mL    FINDINGS:  ANGIOGRAM ABDOMEN/PELVIS: The abdominal aorta is normal in size and caliber. The mesenteric arteries and celiac artery are well-opacified and normal in size and caliber. The RAY arises normally and is normal in size and caliber. Dilated perirectal   vessels are seen along the leftward aspect of the rectum (image 192, series 5, adjacent to the focal area of mural thickening, additionally there is an area of increased enhancement within the lumen of the rectum. (Image 202, series 5).     LOWER CHEST: Multifocal groundglass opacities are seen in  the left lower lobe    HEPATOBILIARY: Layering sludge is seen within the gallbladder. The liver is cirrhotic in appearance, similar to prior exam. There is mildly enlarged measuring 19 cm in craniocaudal dimension, this is decreased in size from prior exam.    PANCREAS: Normal.    SPLEEN: Spleen is enlarged. Similar prior exam.    ADRENAL GLANDS: No significant nodules.    KIDNEYS/BLADDER: No significant mass, stone, or hydronephrosis.    BOWEL: Mild thickening of the distal esophagus is noted, improved from 4/7/2022. The stomach and duodenum are unremarkable. There are fluid-filled loops of small bowel are normal in size and caliber. There is mural thickening of the rectum, (image 205,   series 5) is somewhat asymmetric, more pronounced along its leftward aspect.    LYMPH NODES: No lymphadenopathy.    PELVIC ORGANS: No pelvic masses.    MUSCULOSKELETAL: Unremarkable.      Impression    IMPRESSION:  1.  Dilated perirectal vessels with adjacent mural thickening of the rectum and a focal area of increased enhancement of the lumen of the rectum concerning for bleeding hemorrhoids, given the mural thickening which is asymmetric a rectal mass and/or   polyp may also be present in this region.   2.  Mild thickening of the distal esophagus, may reflect changes of reflux/GERD, improved when compared with 4/7/2022.  3.  Focal groundglass opacity seen in the left lower lobe, which may reflect multifocal infectious process  4.  Hepatosplenomegaly, slightly improved from prior exam with interval decrease in size of the liver. Cirrhotic changes of liver again noted, similar to prior study.  5.  Layering biliary sludge within the gallbladder      [Urgent Result: Dilated perirectal vessels with adjacent mural thickening of the rectum and a focal area of increased enhancement of the lumen of the rectum concerning for bleeding hemorrhoids, given the mural thickening which is asymmetric a rectal mass   and/or polyp may also be  present in this region. ]    Finding was identified on 1/25/2023 10:00 PM.     DR. Posada  was contacted by me on 1/26/2023 12:26 AM and verbalized understanding of the critical result.

## 2023-01-26 NOTE — DISCHARGE SUMMARY
[facility] MEDICINE DISCHARGE/TRANSFER SUMMARY        Assessment & Plan      1. Bleeding rectal varices vs hemorrhoids in the setting of alcoholic liver cirrhosis s/p EGD x2  2. Anemia sec to blood loss with Hgb of 6.7  3. T1DM      Plans --  Patient will be transferred to [facility] for higher level of care - to treat bleeding rectal varices. Could have TIPS procedure, however, his MELD score is 26. Discussed with GI and ICU attendings from the [facility], and patient was accepted for further evaluation/definitive treatment    His mother at the bedside, and they are both made aware of transfer    Currently, hemodynamically stable.       Freeman Posada MD, MPH, FACP, [facility]  Internal Medicine - Hospitalist        Hospital course      Events leading to transfer --- on going rectal bleeding, requiring multiple changes, significant amount of blood and CTA showed rectal bleeding could be from hemorrhoids or bleeding rectal varices. Procedure required to treat the bleeding is not available at [facility]    On the night of Jan 25, 2022 -- he had multiple rectal bleeding in just an hour and required multiple changes. When I saw him, his bottom was soaked with blood. He was given platelets, and PRBC transfusions. He was also on PPI and octreotide.     Calls were made to on call GI - Dr Lagunas - and recommend transfer to the [facility] for TIPS. MD indicates TIPS procedure is done by IR. I did consult IR as well and there are some concerns with doing TIPS    We are able to call the [facility] and discussed the case with GI on call and ICU -- they recommended transfer to address patients condition.     Hospital course   Patient was admitted Jan 21, 2022 for liver failure and hematemesis    During his admission, he was seen by GI and he required 2 EGDs - last one was Jan 25 - CTA abdomen was ordered on the night before he was transferred     Per GI note --- This is a 26 y/o male with PMH Asperger's, anxiety/depression,  and alcoholic cirrhosis diagnosed 4/2022 with history of esophageal varices banded 5/2022, sober since 4/6/22, hepatic encephalopathy, thrombocytopenia undergoing liver transplant evaluation at the [facility] admitted 1/21 for hepatic encephalopathy weeks after he stopped lactulose who we were consulted on 1/23 given hematemesis and melena. EGD 1/23 with esophageal varices with stigmata of recent bleeding, banded but also oozing of portal hypertensive gastropathy and gingival bleeding all contributing. Hemoglobin 6.2 today with continued melena. MELD Na is 26 today. Diagnostic paracentesis ordered but not enough fluid present. Discussed with Hepatologist On-Call at [facility], Dr. Wood, who patient last saw one month ago. He recommended considering RFA of portal hypertensive gastropathy.    Patient was also treated for hepatic encephalopathy, T1DM.           Past Medical History     Past Medical History:   Diagnosis Date     Acute on chronic anemia 04/08/2022     Alcohol use disorder      Alcoholic cirrhosis of liver with ascites (H)      Alcoholic hepatitis      Autism spectrum      Autism spectrum disorder 4/8/2022    High functioning autistic.  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 lives with parents (Leticia and Wes) socially isolated drinks alcohol beer daily      Benign essential hypertension      Bleeding esophageal varices (H) 6/18/2022     Hepatic encephalopathy      Hyponatremia 7/28/2022     Major depressive disorder      Type II Diabetes          Surgical History     Past Surgical History:   Procedure Laterality Date     ESOPHAGOSCOPY, GASTROSCOPY, DUODENOSCOPY (EGD), COMBINED N/A 5/24/2022    Procedure: ESOPHAGOGASTRODUODENOSCOPY (EGD) with esophageal banding;  Surgeon: Gary Hurst MD;  Location: [facility] OR     ESOPHAGOSCOPY, GASTROSCOPY, DUODENOSCOPY (EGD), COMBINED  N/A 6/20/2022    Procedure: ESOPHAGOGASTRODUODENOSCOPY;  Surgeon: Gavino Reza MD;  Location: [facility] OR     ESOPHAGOSCOPY, GASTROSCOPY, DUODENOSCOPY (EGD), COMBINED N/A 1/23/2023    Procedure: ESOPHAGOGASTRODUODENOSCOPY (EGD) with esophageal variceal banding;  Surgeon: Juan Boo MD;  Location: [facility] OR     MIDLINE DOUBLE LUMEN PLACEMENT  5/26/2022          PICC TRIPLE LUMEN PLACEMENT  7/28/2022             Family History      Family History   Problem Relation Age of Onset     Hypertension Mother      Substance Abuse Mother      Thyroid Disease Mother      Heart Failure Father      Substance Abuse Father      Chronic Obstructive Pulmonary Disease Father      Substance Abuse Maternal Grandfather          Social History      .  Social History     Socioeconomic History     Marital status: Single     Spouse name: Not on file     Number of children: Not on file     Years of education: Not on file     Highest education level: Not on file   Occupational History     Not on file   Tobacco Use     Smoking status: Former     Smokeless tobacco: Never     Tobacco comments:     A pack lasted a year, hardly smoked   Vaping Use     Vaping Use: Former   Substance and Sexual Activity     Alcohol use: Not Currently     Comment: No ETOH since 4/6/22     Drug use: Not Currently     Types: Marijuana     Sexual activity: Not on file   Other Topics Concern     Not on file   Social History Narrative    26-year-old history of autism/Asperger's on the spectrum graduated high school at [employer] worked at  and then another  place until the Covid epidemic in 2020 lives with parents (Leticia and Wes) socially isolated drinks alcohol beer daily        End-stage cirrhosis noted on admission to  April 2022     Social Determinants of Health     Financial Resource Strain: Not on file   Food Insecurity: Not on file   Transportation Needs: Not on file   Physical Activity: Not on file   Stress: Not on  file   Social Connections: Not on file   Intimate Partner Violence: Not on file   Housing Stability: Not on file          Allergies      No Known Allergies      Prior to Admission Medications      No current facility-administered medications on file prior to encounter.  acetaminophen (TYLENOL) 500 MG tablet, Take 500 mg by mouth daily as needed for pain  bumetanide (BUMEX) 1 MG tablet, Take 1 tablet (1 mg) by mouth daily  FLUoxetine (PROZAC) 20 MG capsule, Take 60 mg by mouth At Bedtime  folic acid (FOLVITE) 1 MG tablet, Take 1 tablet (1 mg) by mouth daily  gabapentin (NEURONTIN) 100 MG capsule, Take 100 mg by mouth daily as needed  ibuprofen (ADVIL/MOTRIN) 200 MG tablet, Take 400 mg by mouth daily as needed for pain  insulin aspart (NOVOLOG FLEXPEN) 100 UNIT/ML pen, 1 unit per 10 grams of carb, plus additional units based on following scale   For Pre-Meal  - 164 give 1 unit. For Pre-Meal  - 189 give 2 units. For Pre-Meal  - 214 give 3 units. For Pre-Meal  - 239 give 4 units. For Pre-Meal  - 264 give 5 units. For Pre-Meal  - 289 give 6 units. For Pre-Meal  - 314 give 7 units. For Pre-Meal  - 339 give 8 units. For Pre-Meal  - 364 give 9 units.  For Pre-Meal BG greater than or equal to 365 give 10 units (Patient taking differently: 1-10 Units 1 unit per 10 grams of carb, plus additional units based on following scale   For Pre-Meal  - 164 give 1 unit. For Pre-Meal  - 189 give 2 units. For Pre-Meal  - 214 give 3 units. For Pre-Meal  - 239 give 4 units. For Pre-Meal  - 264 give 5 units. For Pre-Meal  - 289 give 6 units. For Pre-Meal  - 314 give 7 units. For Pre-Meal  - 339 give 8 units. For Pre-Meal  - 364 give 9 units.  For Pre-Meal BG greater than or equal to 365 give 10 units)  insulin glargine (LANTUS PEN) 100 UNIT/ML pen, Inject 20 Units Subcutaneous 2 times daily  lactulose (CHRONULAC) 10 GM/15ML solution,  Take 15-30 mLs (10-20 g) by mouth 2 times daily (Patient taking differently: Take 10-20 g by mouth 2 times daily as needed)  multivitamin, therapeutic (THERA-VIT) TABS tablet, Take 1 tablet by mouth in the morning.  pantoprazole (PROTONIX) 40 MG EC tablet, Take 1 tablet (40 mg) by mouth daily  rifaximin (XIFAXAN) 550 MG TABS tablet, Take 1 tablet (550 mg) by mouth 2 times daily  spironolactone (ALDACTONE) 100 MG tablet, Take 100 mg by mouth At Bedtime  thiamine (B-1) 100 MG tablet, Take 1 tablet (100 mg) by mouth in the morning.            Review of Systems     A 12 point comprehensive review of systems was negative except as noted above in HPI.    PHYSICAL EXAMINATION       Vitals      Vitals: /69   Pulse 99   Temp 97.5  F (36.4  C) (Axillary)   Resp 16   Ht 1.524 m (5')   Wt 66.3 kg (146 lb 3.2 oz)   SpO2 98%   BMI 28.55 kg/m    BMI= Body mass index is 28.55 kg/m .      Examination     General Appearance:   cooperative, no distress  Head:    Normocephalic, without obvious abnormality, atraumatic  EENT:  PERRL, conjunctiva/corneas clear, EOM's intact.   Neck:   Supple, symmetrical, trachea midline, no adenopathy; no NVE  Back:  Symmetric, no curvature, no CVA tenderness  Chest/Lungs: Clear to auscultation bilaterally, respirations unlabored, No tenderness or deformity. No abdominal breathing or use of accessory muscles.   Heart:    Regular rate and rhythm, S1 and S2 normal, no murmur, rub   or gallop  Abdomen: Soft, non-tender, bowel sounds active all four quadrants, not peritoneal on palpation.   Extremities:  (+) leg swelling  Skin:  Skin color, texture, turgor normal, no rashes or lesion  Neurologic:  Awake and alert,  No focal deficits

## 2023-01-26 NOTE — PROGRESS NOTES
"CLINICAL NUTRITION SERVICES - ASSESSMENT NOTE     Nutrition Prescription    RECOMMENDATIONS FOR MDs/PROVIDERS TO ORDER:  Diet advancement to Regular diet once medically appropriate.    Malnutrition Status:    Severe malnutrition in the context of acute on chronic illness    Recommendations already ordered by Registered Dietitian (RD):  Supplements: PRN    Future/Additional Recommendations:  Monitor diet advancement and PO adequacy.    If EN support becomes nutrition POC, rec:  TwoCal HN @ 40 mL/hr (960 mL/day) + 1 pkt ProSource daily to provide 2000 kcals (33 kcal/kg/day), 101 g PRO (1.7 g/kg/day), 672 mL H2O, 210 g CHO and 5 g Fiber daily.  - Initiate @ 10 mL/hr and advance by 10 mL q8hr as tolerated to goal rate.   - Do not start or advance unless K+ >/= 3, Mg++ >/=1.5, and phos >/=1.9  - 30 mL q4hr fluid flushes for tube patency. Additional fluids and/or adjustments per MD.   - Multivitamin/mineral (15 mL/day via FT) to help ensure micronutrient needs being met with suspected hypermetabolic demands and potential interruptions to TF infusions.  - If gastric access: HOB >30 degrees.        REASON FOR ASSESSMENT  Dillon Salter is a/an 27 year old male assessed by the dietitian for Admission Nutrition Risk Screen for positive (unsure wt loss)    NUTRITION HISTORY  Pt known to Clinical Nutrition - last assessed by outpatient RD for Liver Transplant evaluation on 12/20/2022. Per that note:  Diet Recall- eats 2x/day \"not enough\"  Chicken, eggs, waffles, pork chops, chili, less f/v now in the winter   Yuly- apple juice (30 oz/day), skim milk (8 cups/day), less water in the winter, sporadic hot tea, Gatorade (8 oz/day)  Eats peanut butter, some hamburger, not much starch (rice), PB toast  Snacks- mini muffins, junk food   Dining out- 2x/week- [employer]- chicken & cheese quesadilla    Pizza     Per visit with pt at bedside today, pt reports he's had minimal PO since Sunday. Pt shares that he \"never goes hungry\" when asked " "how eating and appetite were at home. He denies any early satiety or appetite issues. Pt is vague historian. Denies any vitamin/mineral supplementation. No food allergies.    CURRENT NUTRITION ORDERS  Diet: CLD  Intake/Tolerance: Pt willing to have apple juice and hot black tea for lunch (ordered by writer)    LABS  Labs reviewed  Electrolytes  Potassium (mmol/L)   Date Value   01/26/2023 3.7   01/26/2023 4.0   01/25/2023 4.6   01/24/2023 4.9   01/22/2023 4.1     Phosphorus (mg/dL)   Date Value   01/26/2023 3.3   01/26/2023 3.7   08/14/2022 5.3 (H)   06/19/2022 3.6   06/07/2022 3.2    Blood Glucose  Glucose (mg/dL)   Date Value   01/26/2023 115 (H)   01/26/2023 183 (H)   01/25/2023 259 (H)   01/24/2023 150 (H)   01/22/2023 175 (H)   01/21/2023 179 (H)   11/28/2022 209 (H)     GLUCOSE BY METER POCT (mg/dL)   Date Value   01/26/2023 132 (H)   01/26/2023 122 (H)   01/26/2023 148 (H)   01/26/2023 93   01/26/2023 61 (L)     Hemoglobin A1C (%)   Date Value   07/28/2022 5.4   04/07/2022 7.4 (H)    Inflammatory Markers  WBC Count (10e3/uL)   Date Value   01/26/2023 14.9 (H)   01/26/2023 15.8 (H)   01/26/2023 15.1 (H)     Albumin (g/dL)   Date Value   01/26/2023 3.0 (L)   01/26/2023 2.8 (L)   01/25/2023 3.0 (L)   01/24/2023 2.9 (L)   01/21/2023 3.3 (L)      Magnesium (mg/dL)   Date Value   01/26/2023 2.1   01/26/2023 1.3 (L)   01/24/2023 1.6 (L)     Sodium (mmol/L)   Date Value   01/26/2023 132 (L)   01/26/2023 129 (L)   01/25/2023 126 (L)    Renal  Urea Nitrogen (mg/dL)   Date Value   01/26/2023 19.8   01/26/2023 22.2 (H)   01/25/2023 29 (H)   01/24/2023 32 (H)   01/22/2023 28 (H)     Creatinine (mg/dL)   Date Value   01/26/2023 0.87   01/26/2023 0.95   01/25/2023 1.08     Additional  Ketones Urine (mg/dL)   Date Value   01/25/2023 Negative        MEDICATIONS  Medications reviewed  -Insulin  -Lactulose    ANTHROPOMETRICS  Height: 152.4 cm (5' 0\")  Ht Readings from Last 2 Encounters:   01/26/23 1.524 m (5')   01/21/23 1.524 " m (5')   Most Recent Weight: 70 kg (154 lb 5.2 oz)    IBW: 48.2 kg   BMI: 30.14 kg/m2; Obesity Grade I BMI 30-34.9  Weight History: Wt uptrended compared to December, suspect r/t fluid  Wt Readings from Last 20 Encounters:   01/26/23 70 kg (154 lb 5.2 oz)   01/22/23 66.3 kg (146 lb 3.2 oz)   12/20/22 60.8 kg (134 lb)   10/06/22 60.9 kg (134 lb 3.2 oz)   09/30/22 61.2 kg (135 lb)   09/19/22 61.2 kg (135 lb)   09/01/22 60.6 kg (133 lb 8 oz)   08/22/22 84.9 kg (187 lb 3.2 oz)   08/16/22 75.4 kg (166 lb 3.2 oz)   08/04/22 69.1 kg (152 lb 6.4 oz)   08/02/22 67.6 kg (149 lb)   07/09/22 65.8 kg (145 lb)   07/07/22 67.5 kg (148 lb 14.4 oz)   06/18/22 56.7 kg (125 lb)   06/16/22 56.7 kg (125 lb)   06/14/22 56.7 kg (125 lb)   06/09/22 61.5 kg (135 lb 9.6 oz)   06/01/22 67.3 kg (148 lb 6.4 oz)   05/05/22 54.4 kg (120 lb)   04/12/22 72 kg (158 lb 11.2 oz)   Dosing Weight: 61 kg (actual, based on suspected PTA drier wt of 60.8 kg on 12/22/2022)    ASSESSED NUTRITION NEEDS  Estimated Energy Needs: 8586-5882 kcals/day (30 - 35 kcals/kg)  Justification: Maintenance to increased needs  Estimated Protein Needs: 73-92 grams protein/day (1.2 - 1.5+ grams of pro/kg)  Justification: Hypercatabolism with critical illness  Estimated Fluid Needs: 1 mL/kcal   Justification: Maintenance or Per provider pending fluid status    PHYSICAL FINDINGS  See malnutrition section below.  -Hair thin, nails exceptionally pale, skin jaundiced  -Ascites    MALNUTRITION  % Intake: </= 50% for >/= 5 days (severe)  % Weight Loss: Difficult to assess d/t likely fluid gain - pt appears markedly malnourished  Subcutaneous Fat Loss: Facial region:  Moderate, Upper arm:  Severe and Lower arm:  Severe  Muscle Loss: Temporal:  Mild, Thoracic region (clavicle, acromium bone, deltoid, trapezius, pectoral):  Moderate, Upper arm (bicep, tricep):  Severe, Lower arm  (forearm):  Severe, Upper leg (quadricep, hamstring):  Moderate, Patellar region:  Moderate and Posterior  calf:  Moderate  Fluid Accumulation/Edema: None noted (ascites)  Malnutrition Diagnosis: Severe malnutrition in the context of acute on chronic illness    NUTRITION DIAGNOSIS  Inadequate oral intake related to suspected decreased PO in setting of EtOH cirrhosis as evidenced by moderate-severe subcutaneous adipose and muscle wasting on physical exam and recently very poor/minimal PO with hospitalization.      INTERVENTIONS  Implementation  -Nutrition Education: Provided education on RD role, role of NFPE.    -Enteral Nutrition - Recs if needed     Goals  Diet adv > CLD v nutrition support within 2-3 days.     Monitoring/Evaluation  Progress toward goals will be monitored and evaluated per protocol.    Karlee Sim RD, LD  Pager: [phone]

## 2023-01-26 NOTE — PLAN OF CARE
Major Shift Events:  Patient lethargic this am, much more awake and alert this afternoon. Oriented x4. MCKEON though weak, PERRLA. On room air when awake, requires 2L when sleeping to keep sats from dipping to mid 80s, clear/dim lung sounds. SR with HR 70s-80s, BP stable, afebrile. Rectal pouch placed to attempt to measure amount of bleeding from rectum- did not work well. Continues to have red blood coming from rectum- appearing slightly darker in color this afternoon than this morning. Incontinent of urine/stool, though able to use urinal a few times. Currently clear liquid diet, NPO @ midnight for scope tomorrow. Working on golytelty prep. Octreotide running @ 50 mcg/hr. 3 PIVs in place.     Plan: Q8h CBC checks, possible transfer to floor    For vital signs and complete assessments, please see documentation flowsheets.

## 2023-01-26 NOTE — PROGRESS NOTES
Physical Therapy Discharge Summary    Reason for therapy discharge:    Discharged to [facility]    Progress towards therapy goal(s). See goals on Care Plan in [facility] electronic health record for goal details.  Goals partially met.  Barriers to achieving goals:   limited tolerance for therapy and discharge from facility.    Therapy recommendation(s):    Continued therapy is recommended.  Rationale/Recommendations:  Continue as indicated.

## 2023-01-26 NOTE — CONSULTS
Pt is recent admit from [facility] to receive higher level of care here.    See Care Management Consult from 1/22 below. Pt will continue with this plan of care upon discharge, plan to discharge to home and receive home care services from [facility]- [phone].     Nato Ann RNCC  Orienting with Azmera Teshone 4A      Care Management Consult from 1/22-    Care Management Initial Consult     General Information  Assessment completed with: Patient, Parents (mother Leticia ([phone])),    Type of CM/SW Visit: Initial Assessment     Primary Care Provider verified and updated as needed:     Readmission within the last 30 days: no previous admission in last 30 days      Reason for Consult: discharge planning  Advance Care Planning: Advance Care Planning Reviewed: other (see comments) (requested a copy from mother Leticia)           Communication Assessment  Patient's communication style: spoken language (English or Bilingual)    Hearing Difficulty or Deaf: no   Wear Glasses or Blind: yes     Cognitive  Cognitive/Neuro/Behavioral: .WDL except, level of consciousness  Level of Consciousness: alert, confused  Arousal Level: opens eyes spontaneously  Orientation: disoriented to, situation  Mood/Behavior: anxious  Best Language: 0 - No aphasia  Speech: forced/pressured     Living Environment:   People in home: parent(s)     Current living Arrangements: house (split level, 6 steps up, 6 steps down)      Able to return to prior arrangements: yes        Family/Social Support:  Care provided by: self, parent(s)  Provides care for: no one, unable/limited ability to care for self                Description of Support System:            Current Resources:   Patient receiving home care services: No     Community Resources:  (Alcoholics Anonymous)  Equipment currently used at home: none  Supplies currently used at home: Diabetic Supplies     Employment/Financial:  Employment Status: unemployed        Financial  "Concerns: No concerns identified            Lifestyle & Psychosocial Needs:  Social Determinants of Health          Tobacco Use: Medium Risk     Smoking Tobacco Use: Former     Smokeless Tobacco Use: Never     Passive Exposure: Not on file   Alcohol Use: Not on file   Financial Resource Strain: Not on file   Food Insecurity: Not on file   Transportation Needs: Not on file   Physical Activity: Not on file   Stress: Not on file   Social Connections: Not on file   Intimate Partner Violence: Not on file   Depression: Not at risk     PHQ-2 Score: 0   Housing Stability: Not on file         Functional Status:  Prior to admission patient needed assistance:   Dependent ADLs:: Independent  Dependent IADLs:: Cleaning, Cooking, Laundry, Shopping, Meal Preparation, Medication Management, Money Management, Transportation        Mental Health Status:  Mental Health Status: No Current Concerns  Mental Health Management: Medication     Chemical Dependency Status:  Chemical Dependency Status: Past Concern (sober from alcohol since April 2022)              Values/Beliefs:  Spiritual, Cultural Beliefs, Quaker Practices, Values that affect care: no                Additional Information:  RNCM met with patient and introduced CM role, offered discharge planning.  Pt has autism, high functioning.  Pt alert, oriented to person and place.  Pt's mother Leticia Salter present at bedside and answered questions ([phone]).  Pt lives at home in a split-level house with his mother and his dad Wes.  Split level home with 6 steps to upstairs, 6 steps to downstairs.  Pt has a history of alcohol use but has been sober since he was April 2022.  Pt is followed by Hepatology [facility] at [facility].  Pt is participating in Alcoholics Anonymous in attempt to get on the transplant list; declined any chemical dependency resources.  Pt is independent with ADL's but performs everything \"very slowly\" per pt's mother.  All IADL's managed by pt's parents.  Leticia" requested PT/OT eval, hopeful to get home PT/OT if appropriate; writer notified doctor.  Anticipate discharge to home /home with homecare.  Family to transport.  CM will continue to follow.     Elida Blackwell RN/CM

## 2023-01-26 NOTE — ANESTHESIA PREPROCEDURE EVALUATION
Anesthesia Pre-Procedure Evaluation    Patient: Dillon Salter   MRN: [id number] : 1995        Procedure : Procedure(s):  ULTRASOUND, LOWER GI TRACT, ENDOSCOPIC          Past Medical History:   Diagnosis Date     Acute on chronic anemia 2022     Alcohol use disorder      Alcoholic cirrhosis of liver with ascites (H)      Alcoholic hepatitis      Autism spectrum      Autism spectrum disorder 2022    High functioning autistic.  26-year-old history of autism/Asperger's on the spectrum graduated high school at [employer] worked at [employer] and then another  place until the Covid epidemic in  lives with parents (Leticia and Wes) socially isolated drinks alcohol beer daily      Benign essential hypertension      Bleeding esophageal varices (H) 2022     Hepatic encephalopathy      Hyponatremia 2022     Major depressive disorder      Type II Diabetes       Past Surgical History:   Procedure Laterality Date     ESOPHAGOSCOPY, GASTROSCOPY, DUODENOSCOPY (EGD), COMBINED N/A 2022    Procedure: ESOPHAGOGASTRODUODENOSCOPY (EGD) with esophageal banding;  Surgeon: Gary Hurst MD;  Location: [facility] Main OR     ESOPHAGOSCOPY, GASTROSCOPY, DUODENOSCOPY (EGD), COMBINED N/A 2022    Procedure: ESOPHAGOGASTRODUODENOSCOPY;  Surgeon: Gavino Reza MD;  Location: [facility] Main OR     ESOPHAGOSCOPY, GASTROSCOPY, DUODENOSCOPY (EGD), COMBINED N/A 2023    Procedure: ESOPHAGOGASTRODUODENOSCOPY (EGD) with esophageal variceal banding;  Surgeon: Juan Boo MD;  Location: [facility] Main OR     ESOPHAGOSCOPY, GASTROSCOPY, DUODENOSCOPY (EGD), COMBINED N/A 2023    Procedure: ESOPHAGOGASTRODUODENOSCOPY (EGD);  Surgeon: Juan Boo MD;  Location: [facility] Main OR     MIDLINE DOUBLE LUMEN PLACEMENT  2022          PICC TRIPLE LUMEN PLACEMENT  2022           No Known Allergies   Social History     Tobacco Use     Smoking status: Former     Smokeless  tobacco: Never     Tobacco comments:     A pack lasted a year, hardly smoked   Substance Use Topics     Alcohol use: Not Currently     Comment: No ETOH since 4/6/22      Wt Readings from Last 1 Encounters:   01/26/23 70 kg (154 lb 5.2 oz)        Anesthesia Evaluation   Pt has had prior anesthetic. Type: General.    No history of anesthetic complications       ROS/MED HX  ENT/Pulmonary:       Neurologic:       Cardiovascular:     (+) hypertension-----    METS/Exercise Tolerance: >4 METS    Hematologic:       Musculoskeletal:       GI/Hepatic:     (+) hepatitis type Alcoholic, liver disease,  (-) GERD   Renal/Genitourinary:       Endo:     (+) type II DM, Obesity,     Psychiatric/Substance Use:       Infectious Disease:       Malignancy:       Other:            Physical Exam    Airway        Mallampati: II   TM distance: > 3 FB   Neck ROM: full   Mouth opening: > 3 cm    Respiratory Devices and Support         Dental       (+) Multiple visibly decayed, broken teeth      Cardiovascular             Pulmonary                   OUTSIDE LABS:  CBC:   Lab Results   Component Value Date    WBC 14.9 (H) 01/26/2023    WBC 15.8 (H) 01/26/2023    HGB 8.5 (L) 01/26/2023    HGB 9.5 (L) 01/26/2023    HCT 24.7 (L) 01/26/2023    HCT 27.4 (L) 01/26/2023     (L) 01/26/2023     (L) 01/26/2023     BMP:   Lab Results   Component Value Date     (L) 01/26/2023     (L) 01/26/2023    POTASSIUM 3.7 01/26/2023    POTASSIUM 4.0 01/26/2023    CHLORIDE 98 01/26/2023    CHLORIDE 95 (L) 01/26/2023    CO2 25 01/26/2023    CO2 24 01/26/2023    BUN 19.8 01/26/2023    BUN 22.2 (H) 01/26/2023    CR 0.87 01/26/2023    CR 0.95 01/26/2023     (H) 01/26/2023     (H) 01/26/2023     COAGS:   Lab Results   Component Value Date    PTT 41 (H) 01/26/2023    INR 1.87 (H) 01/26/2023    FIBR 132 (L) 01/26/2023     POC: No results found for: BGM, HCG, HCGS  HEPATIC:   Lab Results   Component Value Date    ALBUMIN 3.0 (L)  01/26/2023    PROTTOTAL 5.7 (L) 01/26/2023    ALT 26 01/26/2023    AST 63 (H) 01/26/2023    ALKPHOS 84 01/26/2023    BILITOTAL 10.3 (H) 01/26/2023    DESI 77 (H) 01/21/2023     OTHER:   Lab Results   Component Value Date    LACT 1.0 01/26/2023    A1C 5.4 07/28/2022    SARTHAK 8.4 (L) 01/26/2023    PHOS 3.3 01/26/2023    MAG 2.1 01/26/2023    LIPASE 50 08/14/2022    TSH 1.09 07/30/2022       Anesthesia Plan    ASA Status:  4   NPO Status:  NPO Appropriate    Anesthesia Type: General.     - Airway: ETT   Induction: Intravenous.   Maintenance: Balanced.   Techniques and Equipment:       - Blood: T&S     Consents    Anesthesia Plan(s) and associated risks, benefits, and realistic alternatives discussed. Questions answered and patient/representative(s) expressed understanding.    - Discussed:     - Discussed with:  Patient      - Extended Intubation/Ventilatory Support Discussed: No.      - Patient is DNR/DNI Status: No    Use of blood products discussed: No .     Postoperative Care    Pain management: Multi-modal analgesia.   PONV prophylaxis: Ondansetron (or other 5HT-3), Dexamethasone or Solumedrol     Comments:                Apolonia Drake MD

## 2023-01-26 NOTE — CONSULTS
Care Management Initial Consult    General Information  Assessment completed with: Patient, Parents,    Type of CM/SW Visit: Initial Assessment  Primary Care Provider verified and updated as needed: Yes   Readmission within the last 30 days: no previous admission in last 30 days   Reason for Consult: care coordination/care conference, decision-making, discharge planning, substance use concerns  Advance Care Planning: Advance Care Planning Reviewed: present on chart        Communication Assessment  Patient's communication style: spoken language (English or Bilingual)    Hearing Difficulty or Deaf: no   Wear Glasses or Blind: yes    Cognitive  Cognitive/Neuro/Behavioral: .WDL except  Level of Consciousness: other (see comments) (drowsy, easily awakened)  Arousal Level: arouses to voice, arouses to touch/gentle shaking  Orientation: oriented x 4  Mood/Behavior: calm, cooperative  Best Language: 0 - No aphasia  Speech: word-finding difficulty, logical, slow    Living Environment:   People in home: parent(s)     Current living Arrangements: house      Able to return to prior arrangements: yes       Family/Social Support:  Care provided by: self, parent(s)  Provides care for: no one, unable/limited ability to care for self  Marital Status: Single  Support System: Parent(s), Neighbor          Description of Support System: Supportive, Involved    Support Assessment: Adequate family and caregiver support, Adequate social supports    Current Resources:   Patient receiving home care services: No  Community Resources: Chemical Dependency Services, County Programs, County Worker, Financial/Insurance, OP Mental Health  Equipment currently used at home: none  Supplies currently used at home: None    Employment/Financial:  Employment Status: unemployed, disabled     Financial Concerns: No concerns identified   Referral to Financial Worker: No       Lifestyle & Psychosocial Needs:  Social Determinants of Health     Tobacco Use:  "Medium Risk     Smoking Tobacco Use: Former     Smokeless Tobacco Use: Never     Passive Exposure: Not on file   Alcohol Use: Not on file   Financial Resource Strain: Not on file   Food Insecurity: Not on file   Transportation Needs: Not on file   Physical Activity: Not on file   Stress: Not on file   Social Connections: Not on file   Intimate Partner Violence: Not on file   Depression: Not at risk     PHQ-2 Score: 0   Housing Stability: Not on file       Functional Status:  Prior to admission patient needed assistance:   Dependent ADLs: Independent  Dependent IADLs: Cleaning, Cooking, Laundry, Meal Preparation, Medication Management, Money Management, Transportation  Assesssment of Functional Status: Not at baseline with ADL Functioning, Not at baseline with mobility, Not at  functional baseline    Mental Health Status:  Mental Health Status: Current Concern    Mental Health Management: Medication, Individual Therapy, Psychiatrist    Chemical Dependency Status:  Chemical Dependency Status: Current Concern    Chemical Dependency Management: Previous treatment          Values/Beliefs:  Spiritual, Cultural Beliefs, Uatsdin Practices, Values that affect care: no               Additional Information:  Per H&P, \"Dillon Salter is a 27 year old male with PMH of Asperger's, alcoholic cirrhosis (diagnosed 4/2022), esophageal varices (banded 5/2022), and hepatic encephalopathy currently undergoing liver transplant evaluation at [facility] who was transferred from [facility] on 1/26/23 for lower GI bleed 2/2 rectal varices vs hemorrhoids requiring advanced GI intervention. Patient is stable with Hgb improved to 7.9 s/p 2 units pRBCs.\"    MOLLY talked w/ bedside RN re: SW consult. She would just like to confirm that pt's mother is OK to be involved in care, get updates, and help pt make medical decisions. Pt has active HCD on file naming mother Leticia as Health Care Agent. MOLLY met w/ pt in room to address this and confirm " that he is OK w/ Leticia getting updates and helping to make decisions. Pt confirmed that he is OK w/ Leticia being involved and expressed understanding that Leticia is his health care agent. Pt was very sleepy so SW did not bother him further at this time.    SW completed assessment via chart review. Pt lives with his mother and father in a house in [place], [place]. Pt is independent with personal cares/ADLs but does receive assistance w/ several IADLs including medication management. Pt has not worked for a few years. Pt's primary support are his parents, but he also has some friends as well. Pt has abstained from alcohol since April 2022 but prior to that he drank heavily. Pt also has mental health concerns including depression and anxiety. He takes Prozac and was referred to a mental health therapist (Dr. Randall). Pt and family are eager for pt to be worked up for liver transplant.     MOLLY/RNCC will continue to follow for support and discharge planning    CHRISTIAN Grant, NADIASW  4A/4C   Ph: [phone]  Pager: [phone]

## 2023-01-26 NOTE — H&P
MEDICAL ICU H&P  01/26/2023    Date of Hospital Admission: 1/26/23  Date of ICU Admission: 1/26/23  Reason for Critical Care Admission: GI Bleed  Date of Service (when I saw the patient): 01/26/2023    ASSESSMENT: Dillon Salter is a 27 year old male with PMH of Asperger's, alcoholic cirrhosis (diagnosed 4/2022), esophageal varices (banded 5/2022), and hepatic encephalopathy currently undergoing liver transplant evaluation at [facility] who was transferred from [facility] on 1/26/23 for lower GI bleed 2/2 rectal varices vs hemorrhoids requiring advanced GI intervention. Patient is stable with Hgb improved to 7.9 s/p 2 units pRBCs.       CHANGES and MAJOR THINGS TODAY:   - GI will see patient in AM  - AM CBC and CMP at 9am   - Octreotide and Pantoprazole gtt  - Zosyn      PLAN:    Neuro:  # Pain  Patient denying pain.     Pulmonary:  No acute concerns    Cardiovascular:  No acute concerns     GI/Nutrition:  # Decompensated alcoholic cirrhosis  # Esophageal varices s/p banding  # Bleeding rectal hemorrhoids vs. Varices  Patient transferred to from [facility] to [facility] for lower GI bleed likely 2/2 rectal hemorrhoids requiring advanced GI intervention. CTA 1/25 showed dilated perirectal vessels concerning for bleeding hemorrhoids. Hgb decreased from 8.2 to 6.7 requiring 2 units pRBCs --> improved to 7.9 on admission. Patient is undergoing liver transplant eval at [facility] (Dr. Wood). Per chart review, has not had alcohol since 4/6/22.   - GI Consulted   - Repeat CBC 9am  - Cont Octreotide   - Cont PPI BID  - HOLD PTA spironolactone given NPO, monitor BP  - Rifaximin 550 mg BID  - Continue Lactulose 20g TID    MELD-Na score: 30 at 1/25/2023  8:31 AM  MELD score: 24 at 1/25/2023  8:31 AM  Calculated from:  Serum Creatinine: 1.08 mg/dL at 1/25/2023  8:31 AM  Serum Sodium: 126 mmol/L at 1/25/2023  8:31 AM  Total Bilirubin: 14.0 mg/dL at 1/25/2023  8:31 AM  INR(ratio): 1.83 at 1/25/2023  8:31 AM  Age: 27 years    #  Hepatic Encephalopathy  Patient was treated for hepatic encephalopathy at [facility] from 1/21-1/26 with improving mentation per chart review. On admission, patient is alert and oriented x 3. Mild asterixis on exam.   - Cont lactulose 20 grams TID   - Cont Rifaximin    Renal/Fluids/Electrolytes:  # Hyponatremia  Hyponatremia to 129, likely 2/2 liver cirrhosis.   -D5 .45NS    # Hypomagnesemia  1.3 on admission  -2 g Mag ordered, monitor      Endocrine:  # T2DM  BG decreased to 65 on arrival. Last A1c 5.4 in 7/2022.   - Started D5 0.45% NS given NPO  - hypoglycemia protocol  - LDSSI      ID:  #Leukocytosis   Patient had upward trending WBC since 1/21 to 18.0, now down to 15.1 on admission. Outside hospital started Zosyn on 1/25 given leukocytosis. NGTD on blood cultures. CTA 1/25 showed focal ground glass opacity in left lower lobe which could possibly reflect infectious process. However, lower suspicion given Spo2 in mid-high 90s on RA, no other evidence of respiratory infection.   - Cont Zosyn (1/25-      Hematology:    # Acute on Chronic Anemia  # Thrombocytopenia  Hgb down to 6.2 1/25 due to bleeding from rectum likely 2/2 hemorrhoids vs. rectal varices. Hgb improved to 7.9 s/p 2 units pRBCs. Continue to monitor for signs of bleeding.   - Daily CBC    Musculoskeletal:  No acute concerns.     Skin:  No acute concerns.     Psych:   #Anxiety/depression   -Cont PTA Fluoxetine       General Cares/Prophylaxis:    DVT Prophylaxis: Pneumatic Compression Devices  GI Prophylaxis: PPI  Restraints: None  Family Communication: Mother on phone  Code Status: Full Code    Lines/tubes/drains:  - PIV    Disposition:  - Medical ICU     Patient seen and findings/plan discussed with medical ICU staff, Dr. Serrano.    Noa Llanes, MS-4  [employer]    Resident/Fellow Attestation   I, Criselda Posada MD, was present with the medical/CHRISTA student who participated in the service and in the documentation of  "the note.  I have verified the history and personally performed the physical exam and medical decision making.  I agree with the assessment and plan of care as documented in the note.      Criselda Posada MD  Internal Medicine-PGY2  [facility]    -----------------------------------------------------------------------    HISTORY PRESENTING ILLNESS:     Dillon Salter is a 27 year old male with PMH of Asperger's, alcoholic cirrhosis (diagnosed 4/2022), esophageal varices (banded 5/2022), and hepatic encephalopathy currently undergoing liver transplant evaluation at [facility]. He was transferred from [facility] on 1/26/23 for lower GI bleed 2/2 rectal varices vs hemorrhoids requiring advanced GI intervention.     Per chart review, \"He was admitted to [facility] on 1/21 with worsening hepatic encephalopathy (had stopped lactulose).  During this hospitalization he has had intermittent bleeding (gums, melanotic stools, hematemesis).  He was seen by GI and underwent upper endoscopy on 1/21 revealing grade 1 esophageal varices and evidence of portal hypertensive gastropathy. Second EGD also done. No identified source of active bleeding. Given continued melena, a CTA was performed 1/25 notable for dilated perirectal vessels on the left, \"concerning for bleeding hemorrhoids\".  No other suspicious site of active gastrointestinal hemorrhage.\"      On admission to MICU, patient denies abdominal pain, nausea, vomiting, chest pain, SOB. Endorses mild chills. He says he is feeling okay and has no other concerns.       REVIEW OF SYSTEMS: See HPI, ROS otherwise negative.     PAST MEDICAL HISTORY:   Past Medical History:   Diagnosis Date     Acute on chronic anemia 04/08/2022     Alcohol use disorder      Alcoholic cirrhosis of liver with ascites (H)      Alcoholic hepatitis      Autism spectrum      Autism spectrum disorder 4/8/2022    High functioning autistic.  26-year-old history of autism/Asperger's on the " spectrum graduated high school at [employer] worked at  and then another  place until the Covid epidemic in 2020 lives with parents (Leticia and Wes) socially isolated drinks alcohol beer daily      Benign essential hypertension      Bleeding esophageal varices (H) 6/18/2022     Hepatic encephalopathy      Hyponatremia 7/28/2022     Major depressive disorder      Type II Diabetes      SURGICAL HISTORY:  Past Surgical History:   Procedure Laterality Date     ESOPHAGOSCOPY, GASTROSCOPY, DUODENOSCOPY (EGD), COMBINED N/A 5/24/2022    Procedure: ESOPHAGOGASTRODUODENOSCOPY (EGD) with esophageal banding;  Surgeon: Gary Hurst MD;  Location: [facility] Main OR     ESOPHAGOSCOPY, GASTROSCOPY, DUODENOSCOPY (EGD), COMBINED N/A 6/20/2022    Procedure: ESOPHAGOGASTRODUODENOSCOPY;  Surgeon: Gavino Reza MD;  Location: [facility] Main OR     ESOPHAGOSCOPY, GASTROSCOPY, DUODENOSCOPY (EGD), COMBINED N/A 1/23/2023    Procedure: ESOPHAGOGASTRODUODENOSCOPY (EGD) with esophageal variceal banding;  Surgeon: Juan Boo MD;  Location: [facility] Main OR     MIDLINE DOUBLE LUMEN PLACEMENT  5/26/2022          PICC TRIPLE LUMEN PLACEMENT  7/28/2022          SOCIAL HISTORY:  Social History     Socioeconomic History     Marital status: Single   Tobacco Use     Smoking status: Former     Smokeless tobacco: Never     Tobacco comments:     A pack lasted a year, hardly smoked   Vaping Use     Vaping Use: Former   Substance and Sexual Activity     Alcohol use: Not Currently     Comment: No ETOH since 4/6/22     Drug use: Not Currently     Types: Marijuana   Social History Narrative    26-year-old history of autism/Asperger's on the spectrum graduated high school at [employer] worked at  and then another  place until the Covid epidemic in 2020 lives with parents (Leticia and Wes) socially isolated drinks alcohol beer daily        End-stage cirrhosis noted on admission to  April 2022     FAMILY HISTORY:    Family History   Problem Relation Age of Onset     Hypertension Mother      Substance Abuse Mother      Thyroid Disease Mother      Heart Failure Father      Substance Abuse Father      Chronic Obstructive Pulmonary Disease Father      Substance Abuse Maternal Grandfather      ALLERGIES:   No Known Allergies  MEDICATIONS:  [COMPLETED] 0.9% sodium chloride BOLUS  acetaminophen (TYLENOL) tablet 650 mg   Or  acetaminophen (TYLENOL) Suppository 650 mg  acetaminophen (TYLENOL) tablet 500 mg  bisacodyl (DULCOLAX) suppository 10 mg  bumetanide (BUMEX) tablet 1 mg  chlorhexidine (PERIDEX) 0.12 % solution 15 mL  glucose gel 15-30 g   Or  dextrose 50 % injection 25-50 mL   Or  glucagon injection 1 mg  glucose gel 15-30 g   Or  dextrose 50 % injection 25-50 mL   Or  glucagon injection 1 mg  FLUoxetine (PROzac) capsule 60 mg  folic acid (FOLVITE) tablet 1 mg  gabapentin (NEURONTIN) capsule 100 mg  insulin aspart (NovoLOG) injection (RAPID ACTING)  insulin aspart (NovoLOG) injection (RAPID ACTING)  insulin glargine (LANTUS PEN) injection 16 Units  [COMPLETED] iopamidol (ISOVUE-370) solution 100 mL  lactulose (CHRONULAC) solution 10 g  lidocaine (LMX4) cream  lidocaine 1 % 0.1-1 mL  melatonin tablet 1 mg  multivitamin, therapeutic (THERA-VIT) tablet 1 tablet  octreotide (sandoSTATIN) 1,250 mcg in D5W 250 mL  ondansetron (ZOFRAN ODT) ODT tab 4 mg   Or  ondansetron (ZOFRAN) injection 4 mg  pantoprazole (PROTONIX) IV push injection 40 mg  [COMPLETED] piperacillin-tazobactam (ZOSYN) 3.375 g vial to attach to  mL bag   Followed by  piperacillin-tazobactam (ZOSYN) 3.375 g vial to attach to  mL bag  polyethylene glycol (MIRALAX) Packet 17 g  rifaximin (XIFAXAN) tablet 550 mg  senna-docusate (SENOKOT-S/PERICOLACE) 8.6-50 MG per tablet 1 tablet   Or  senna-docusate (SENOKOT-S/PERICOLACE) 8.6-50 MG per tablet 2 tablet  sodium chloride (PF) 0.9% PF flush 3 mL  sodium chloride (PF) 0.9% PF flush 3 mL  sodium chloride 0.9%  infusion  [Held by provider] spironolactone (ALDACTONE) tablet 100 mg  thiamine (B-1) tablet 100 mg    acetaminophen (TYLENOL) 500 MG tablet, Take 500 mg by mouth daily as needed for pain  bumetanide (BUMEX) 1 MG tablet, Take 1 tablet (1 mg) by mouth daily  FLUoxetine (PROZAC) 20 MG capsule, Take 60 mg by mouth At Bedtime  folic acid (FOLVITE) 1 MG tablet, Take 1 tablet (1 mg) by mouth daily  gabapentin (NEURONTIN) 100 MG capsule, Take 100 mg by mouth daily as needed  ibuprofen (ADVIL/MOTRIN) 200 MG tablet, Take 400 mg by mouth daily as needed for pain  insulin aspart (NOVOLOG FLEXPEN) 100 UNIT/ML pen, 1 unit per 10 grams of carb, plus additional units based on following scale   For Pre-Meal  - 164 give 1 unit. For Pre-Meal  - 189 give 2 units. For Pre-Meal  - 214 give 3 units. For Pre-Meal  - 239 give 4 units. For Pre-Meal  - 264 give 5 units. For Pre-Meal  - 289 give 6 units. For Pre-Meal  - 314 give 7 units. For Pre-Meal  - 339 give 8 units. For Pre-Meal  - 364 give 9 units.  For Pre-Meal BG greater than or equal to 365 give 10 units (Patient taking differently: 1-10 Units 1 unit per 10 grams of carb, plus additional units based on following scale   For Pre-Meal  - 164 give 1 unit. For Pre-Meal  - 189 give 2 units. For Pre-Meal  - 214 give 3 units. For Pre-Meal  - 239 give 4 units. For Pre-Meal  - 264 give 5 units. For Pre-Meal  - 289 give 6 units. For Pre-Meal  - 314 give 7 units. For Pre-Meal  - 339 give 8 units. For Pre-Meal  - 364 give 9 units.  For Pre-Meal BG greater than or equal to 365 give 10 units)  insulin glargine (LANTUS PEN) 100 UNIT/ML pen, Inject 20 Units Subcutaneous 2 times daily  lactulose (CHRONULAC) 10 GM/15ML solution, Take 15-30 mLs (10-20 g) by mouth 2 times daily (Patient taking differently: Take 10-20 g by mouth 2 times daily as needed)  multivitamin, therapeutic (THERA-VIT) TABS  tablet, Take 1 tablet by mouth in the morning.  pantoprazole (PROTONIX) 40 MG EC tablet, Take 1 tablet (40 mg) by mouth daily  rifaximin (XIFAXAN) 550 MG TABS tablet, Take 1 tablet (550 mg) by mouth 2 times daily  spironolactone (ALDACTONE) 100 MG tablet, Take 100 mg by mouth At Bedtime  thiamine (B-1) 100 MG tablet, Take 1 tablet (100 mg) by mouth in the morning.        PHYSICAL EXAMINATION:  Temp:  [96.7  F (35.9  C)-100  F (37.8  C)] 97.6  F (36.4  C)  Pulse:  [] 105  Resp:  [16-20] 18  BP: (104-147)/(53-84) 136/80  SpO2:  [95 %-100 %] 98 %  General: Appears comfortable  HEENT: Scleral icterus, EOMI  Neuro: A&Ox3, NAD. Mild asterixis.   Pulm/Resp: Clear breath sounds bilaterally without rhonchi, crackles or wheeze, breathing non-labored  CV: RRR, systolic ejection murmur  Abdomen: Soft, distended, non-tender  Incisions/Skin: Jaundice of face. No rashes.     LABS: Reviewed.   Arterial Blood Gases   No lab results found in last 7 days.  Complete Blood Count   Recent Labs   Lab 01/25/23  2208 01/25/23  1407 01/25/23  0831 01/24/23  1854 01/24/23  0809 01/23/23  0856 01/22/23  1002   WBC  --   --  18.0*  --  15.0* 12.8* 11.8*   HGB 6.7* 7.6* 8.1* 8.3* 6.2* 8.2* 8.7*   PLT  --   --  142*  --  142* 147* 142*     Basic Metabolic Panel  Recent Labs   Lab 01/25/23  2216 01/25/23  1556 01/25/23  1218 01/25/23  0831 01/24/23  1330 01/24/23  0809 01/22/23  1146 01/22/23  1002 01/21/23  1720 01/21/23  0754   NA  --   --   --  126*  --  132*  --  132*  --  132*   POTASSIUM  --   --   --  4.6  --  4.9  --  4.1  --  5.0   CHLORIDE  --   --   --  92*  --  96*  --  95*  --  97*   CO2  --   --   --  26  --  24  --  24  --  27   BUN  --   --   --  29*  --  32*  --  28*  --  19   CR  --   --   --  1.08  --  0.89  --  0.97  --  0.77   * 179* 212* 259*   < > 150*   < > 175*   < > 179*    < > = values in this interval not displayed.     Liver Function Tests  Recent Labs   Lab 01/25/23  0831 01/24/23  1047 01/24/23  0809  01/21/23  0754   AST 53*  --  60* 74*   ALT 35  --  30 39   ALKPHOS 98  --  102 132*   BILITOTAL 14.0*  --  11.0* 11.9*   ALBUMIN 3.0*  --  2.9* 3.3*   INR 1.83* 2.00*  --  1.54*     Coagulation Profile  Recent Labs   Lab 01/25/23  0831 01/24/23  1047 01/21/23  0754   INR 1.83* 2.00* 1.54*   PTT  --  46*  --        IMAGING:  Recent Results (from the past 24 hour(s))   XR Chest Port 1 View    Narrative    EXAM: XR CHEST PORT 1 VIEW  LOCATION: [facility]  DATE/TIME: 1/25/2023 10:33 AM    INDICATION: white count, fever  COMPARISON: 12/22/2022      Impression    IMPRESSION: Lungs are clear. No pleural effusion. Heart size and pulmonary vascularity within normal limits.   CTA Abdomen Pelvis with Contrast   Result Value    Radiologist flags (Urgent)     Dilated perirectal vessels with adjacent mural thickening of the rectum and a focal area of increased enhancement of the lumen of the rectum concerning for bleeding hemorrhoids, given the mural thickening which is asymmetric a rectal mass and/or polyp   may also be present in this region.      Narrative    EXAM: CTA ABDOMEN PELVIS WITH CONTRAST  LOCATION: [facility]  DATE/TIME: 1/25/2023 8:25 PM    INDICATION: Melena, cirrhosis, active oral bleeding with no other source on EGD. CTA abdom to evaluate other cause source  COMPARISON: Prior study dated 4/7/2022  TECHNIQUE: CT angiogram abdomen pelvis during arterial phase of injection of IV contrast. 2D and 3D MIP reconstructions were performed by the CT technologist. Dose reduction techniques were used.  CONTRAST: ISOVUE 370 90mL    FINDINGS:  ANGIOGRAM ABDOMEN/PELVIS: The abdominal aorta is normal in size and caliber. The mesenteric arteries and celiac artery are well-opacified and normal in size and caliber. The RAY arises normally and is normal in size and caliber. Dilated perirectal   vessels are seen along the leftward aspect of the rectum (image 192, series 5, adjacent  to the focal area of mural thickening, additionally there is an area of increased enhancement within the lumen of the rectum. (Image 202, series 5).     LOWER CHEST: Multifocal groundglass opacities are seen in the left lower lobe    HEPATOBILIARY: Layering sludge is seen within the gallbladder. The liver is cirrhotic in appearance, similar to prior exam. There is mildly enlarged measuring 19 cm in craniocaudal dimension, this is decreased in size from prior exam.    PANCREAS: Normal.    SPLEEN: Spleen is enlarged. Similar prior exam.    ADRENAL GLANDS: No significant nodules.    KIDNEYS/BLADDER: No significant mass, stone, or hydronephrosis.    BOWEL: Mild thickening of the distal esophagus is noted, improved from 4/7/2022. The stomach and duodenum are unremarkable. There are fluid-filled loops of small bowel are normal in size and caliber. There is mural thickening of the rectum, (image 205,   series 5) is somewhat asymmetric, more pronounced along its leftward aspect.    LYMPH NODES: No lymphadenopathy.    PELVIC ORGANS: No pelvic masses.    MUSCULOSKELETAL: Unremarkable.      Impression    IMPRESSION:  1.  Dilated perirectal vessels with adjacent mural thickening of the rectum and a focal area of increased enhancement of the lumen of the rectum concerning for bleeding hemorrhoids, given the mural thickening which is asymmetric a rectal mass and/or   polyp may also be present in this region.   2.  Mild thickening of the distal esophagus, may reflect changes of reflux/GERD, improved when compared with 4/7/2022.  3.  Focal groundglass opacity seen in the left lower lobe, which may reflect multifocal infectious process  4.  Hepatosplenomegaly, slightly improved from prior exam with interval decrease in size of the liver. Cirrhotic changes of liver again noted, similar to prior study.  5.  Layering biliary sludge within the gallbladder      [Urgent Result: Dilated perirectal vessels with adjacent mural thickening of  the rectum and a focal area of increased enhancement of the lumen of the rectum concerning for bleeding hemorrhoids, given the mural thickening which is asymmetric a rectal mass   and/or polyp may also be present in this region. ]    Finding was identified on 1/25/2023 10:00 PM.     DR. Posada  was contacted by me on 1/26/2023 12:26 AM and verbalized understanding of the critical result.

## 2023-01-27 ENCOUNTER — APPOINTMENT (OUTPATIENT)
Dept: GENERAL RADIOLOGY | Facility: CLINIC | Age: 28
DRG: 377 | End: 2023-01-27
Attending: INTERNAL MEDICINE
Payer: COMMERCIAL

## 2023-01-27 ENCOUNTER — APPOINTMENT (OUTPATIENT)
Dept: CARDIOLOGY | Facility: CLINIC | Age: 28
DRG: 377 | End: 2023-01-27
Attending: INTERNAL MEDICINE
Payer: COMMERCIAL

## 2023-01-27 ENCOUNTER — ANESTHESIA (OUTPATIENT)
Dept: SURGERY | Facility: CLINIC | Age: 28
DRG: 377 | End: 2023-01-27
Payer: COMMERCIAL

## 2023-01-27 LAB
ALBUMIN SERPL BCG-MCNC: 2.8 G/DL (ref 3.5–5.2)
ALP SERPL-CCNC: 82 U/L (ref 40–129)
ALT SERPL W P-5'-P-CCNC: 26 U/L (ref 10–50)
ANION GAP SERPL CALCULATED.3IONS-SCNC: 8 MMOL/L (ref 7–15)
AST SERPL W P-5'-P-CCNC: 63 U/L (ref 10–50)
ATRIAL RATE - MUSE: 89 BPM
BILIRUB SERPL-MCNC: 10.1 MG/DL
BUN SERPL-MCNC: 11.7 MG/DL (ref 6–20)
CALCIUM SERPL-MCNC: 8.1 MG/DL (ref 8.6–10)
CHLORIDE SERPL-SCNC: 98 MMOL/L (ref 98–107)
CREAT SERPL-MCNC: 0.65 MG/DL (ref 0.67–1.17)
DEPRECATED HCO3 PLAS-SCNC: 25 MMOL/L (ref 22–29)
DIASTOLIC BLOOD PRESSURE - MUSE: NORMAL MMHG
ERYTHROCYTE [DISTWIDTH] IN BLOOD BY AUTOMATED COUNT: 21.2 % (ref 10–15)
ERYTHROCYTE [DISTWIDTH] IN BLOOD BY AUTOMATED COUNT: 21.4 % (ref 10–15)
ERYTHROCYTE [DISTWIDTH] IN BLOOD BY AUTOMATED COUNT: 21.6 % (ref 10–15)
FIBRINOGEN PPP-MCNC: 162 MG/DL (ref 170–490)
FIBRINOGEN PPP-MCNC: 167 MG/DL (ref 170–490)
FIBRINOGEN PPP-MCNC: 185 MG/DL (ref 170–490)
GFR SERPL CREATININE-BSD FRML MDRD: >90 ML/MIN/1.73M2
GLUCOSE BLDC GLUCOMTR-MCNC: 129 MG/DL (ref 70–99)
GLUCOSE BLDC GLUCOMTR-MCNC: 132 MG/DL (ref 70–99)
GLUCOSE BLDC GLUCOMTR-MCNC: 143 MG/DL (ref 70–99)
GLUCOSE BLDC GLUCOMTR-MCNC: 162 MG/DL (ref 70–99)
GLUCOSE BLDC GLUCOMTR-MCNC: 169 MG/DL (ref 70–99)
GLUCOSE BLDC GLUCOMTR-MCNC: 197 MG/DL (ref 70–99)
GLUCOSE BLDC GLUCOMTR-MCNC: 366 MG/DL (ref 70–99)
GLUCOSE SERPL-MCNC: 141 MG/DL (ref 70–99)
HCT VFR BLD AUTO: 22.3 % (ref 40–53)
HCT VFR BLD AUTO: 23.8 % (ref 40–53)
HCT VFR BLD AUTO: 25.1 % (ref 40–53)
HGB BLD-MCNC: 7.9 G/DL (ref 13.3–17.7)
HGB BLD-MCNC: 8.2 G/DL (ref 13.3–17.7)
HGB BLD-MCNC: 8.5 G/DL (ref 13.3–17.7)
INTERPRETATION ECG - MUSE: NORMAL
LACTATE SERPL-SCNC: 1.4 MMOL/L (ref 0.7–2)
LVEF ECHO: NORMAL
MAGNESIUM SERPL-MCNC: 1.6 MG/DL (ref 1.7–2.3)
MCH RBC QN AUTO: 32 PG (ref 26.5–33)
MCH RBC QN AUTO: 32.1 PG (ref 26.5–33)
MCH RBC QN AUTO: 32.4 PG (ref 26.5–33)
MCHC RBC AUTO-ENTMCNC: 33.9 G/DL (ref 31.5–36.5)
MCHC RBC AUTO-ENTMCNC: 34.5 G/DL (ref 31.5–36.5)
MCHC RBC AUTO-ENTMCNC: 35.4 G/DL (ref 31.5–36.5)
MCV RBC AUTO: 91 FL (ref 78–100)
MCV RBC AUTO: 93 FL (ref 78–100)
MCV RBC AUTO: 95 FL (ref 78–100)
P AXIS - MUSE: 50 DEGREES
PHOSPHATE SERPL-MCNC: 2.3 MG/DL (ref 2.5–4.5)
PLATELET # BLD AUTO: 104 10E3/UL (ref 150–450)
PLATELET # BLD AUTO: 106 10E3/UL (ref 150–450)
PLATELET # BLD AUTO: 117 10E3/UL (ref 150–450)
POTASSIUM SERPL-SCNC: 3.9 MMOL/L (ref 3.4–5.3)
PR INTERVAL - MUSE: 182 MS
PROT SERPL-MCNC: 5.1 G/DL (ref 6.4–8.3)
QRS DURATION - MUSE: 90 MS
QT - MUSE: 356 MS
QTC - MUSE: 433 MS
R AXIS - MUSE: 31 DEGREES
RBC # BLD AUTO: 2.44 10E6/UL (ref 4.4–5.9)
RBC # BLD AUTO: 2.56 10E6/UL (ref 4.4–5.9)
RBC # BLD AUTO: 2.65 10E6/UL (ref 4.4–5.9)
SODIUM SERPL-SCNC: 131 MMOL/L (ref 136–145)
SYSTOLIC BLOOD PRESSURE - MUSE: NORMAL MMHG
T AXIS - MUSE: 12 DEGREES
VENTRICULAR RATE- MUSE: 89 BPM
WBC # BLD AUTO: 11.1 10E3/UL (ref 4–11)
WBC # BLD AUTO: 13.8 10E3/UL (ref 4–11)
WBC # BLD AUTO: 15.3 10E3/UL (ref 4–11)

## 2023-01-27 PROCEDURE — 85027 COMPLETE CBC AUTOMATED: CPT

## 2023-01-27 PROCEDURE — 85027 COMPLETE CBC AUTOMATED: CPT | Performed by: INTERNAL MEDICINE

## 2023-01-27 PROCEDURE — 250N000009 HC RX 250: Performed by: INTERNAL MEDICINE

## 2023-01-27 PROCEDURE — 250N000013 HC RX MED GY IP 250 OP 250 PS 637

## 2023-01-27 PROCEDURE — 255N000002 HC RX 255 OP 636

## 2023-01-27 PROCEDURE — 85384 FIBRINOGEN ACTIVITY: CPT

## 2023-01-27 PROCEDURE — 250N000013 HC RX MED GY IP 250 OP 250 PS 637: Performed by: INTERNAL MEDICINE

## 2023-01-27 PROCEDURE — BD47ZZZ ULTRASONOGRAPHY OF GASTROINTESTINAL TRACT: ICD-10-PCS | Performed by: INTERNAL MEDICINE

## 2023-01-27 PROCEDURE — 710N000010 HC RECOVERY PHASE 1, LEVEL 2, PER MIN: Performed by: INTERNAL MEDICINE

## 2023-01-27 PROCEDURE — 250N000009 HC RX 250: Performed by: NURSE ANESTHETIST, CERTIFIED REGISTERED

## 2023-01-27 PROCEDURE — 999N000141 HC STATISTIC PRE-PROCEDURE NURSING ASSESSMENT: Performed by: INTERNAL MEDICINE

## 2023-01-27 PROCEDURE — 85384 FIBRINOGEN ACTIVITY: CPT | Performed by: INTERNAL MEDICINE

## 2023-01-27 PROCEDURE — 250N000011 HC RX IP 250 OP 636: Mod: JA

## 2023-01-27 PROCEDURE — 3E0H8TZ INTRODUCTION OF DESTRUCTIVE AGENT INTO LOWER GI, VIA NATURAL OR ARTIFICIAL OPENING ENDOSCOPIC: ICD-10-PCS | Performed by: INTERNAL MEDICINE

## 2023-01-27 PROCEDURE — 36415 COLL VENOUS BLD VENIPUNCTURE: CPT | Performed by: INTERNAL MEDICINE

## 2023-01-27 PROCEDURE — 258N000003 HC RX IP 258 OP 636: Performed by: NURSE ANESTHETIST, CERTIFIED REGISTERED

## 2023-01-27 PROCEDURE — 36415 COLL VENOUS BLD VENIPUNCTURE: CPT

## 2023-01-27 PROCEDURE — C9113 INJ PANTOPRAZOLE SODIUM, VIA: HCPCS

## 2023-01-27 PROCEDURE — 272N000001 HC OR GENERAL SUPPLY STERILE: Performed by: INTERNAL MEDICINE

## 2023-01-27 PROCEDURE — 360N000078 HC SURGERY LEVEL 5, PER MIN: Performed by: INTERNAL MEDICINE

## 2023-01-27 PROCEDURE — 258N000003 HC RX IP 258 OP 636: Performed by: ANESTHESIOLOGY

## 2023-01-27 PROCEDURE — 999N000179 XR SURGERY CARM FLUORO LESS THAN 5 MIN W STILLS: Mod: TC

## 2023-01-27 PROCEDURE — 83735 ASSAY OF MAGNESIUM: CPT | Performed by: INTERNAL MEDICINE

## 2023-01-27 PROCEDURE — 120N000003 HC R&B IMCU UMMC

## 2023-01-27 PROCEDURE — 93306 TTE W/DOPPLER COMPLETE: CPT | Mod: 26 | Performed by: INTERNAL MEDICINE

## 2023-01-27 PROCEDURE — 0DJ08ZZ INSPECTION OF UPPER INTESTINAL TRACT, VIA NATURAL OR ARTIFICIAL OPENING ENDOSCOPIC: ICD-10-PCS | Performed by: INTERNAL MEDICINE

## 2023-01-27 PROCEDURE — 250N000011 HC RX IP 250 OP 636: Performed by: NURSE ANESTHETIST, CERTIFIED REGISTERED

## 2023-01-27 PROCEDURE — 250N000025 HC SEVOFLURANE, PER MIN: Performed by: INTERNAL MEDICINE

## 2023-01-27 PROCEDURE — 258N000003 HC RX IP 258 OP 636: Performed by: INTERNAL MEDICINE

## 2023-01-27 PROCEDURE — 84100 ASSAY OF PHOSPHORUS: CPT | Performed by: INTERNAL MEDICINE

## 2023-01-27 PROCEDURE — 250N000011 HC RX IP 250 OP 636

## 2023-01-27 PROCEDURE — 83605 ASSAY OF LACTIC ACID: CPT

## 2023-01-27 PROCEDURE — 258N000003 HC RX IP 258 OP 636

## 2023-01-27 PROCEDURE — 999N000208 ECHOCARDIOGRAM COMPLETE

## 2023-01-27 PROCEDURE — 250N000012 HC RX MED GY IP 250 OP 636 PS 637: Performed by: INTERNAL MEDICINE

## 2023-01-27 PROCEDURE — 255N000002 HC RX 255 OP 636: Performed by: INTERNAL MEDICINE

## 2023-01-27 PROCEDURE — 80053 COMPREHEN METABOLIC PANEL: CPT

## 2023-01-27 PROCEDURE — 99233 SBSQ HOSP IP/OBS HIGH 50: CPT | Mod: GC | Performed by: INTERNAL MEDICINE

## 2023-01-27 PROCEDURE — 370N000017 HC ANESTHESIA TECHNICAL FEE, PER MIN: Performed by: INTERNAL MEDICINE

## 2023-01-27 RX ORDER — LEVOFLOXACIN 5 MG/ML
INJECTION, SOLUTION INTRAVENOUS PRN
Status: DISCONTINUED | OUTPATIENT
Start: 2023-01-27 | End: 2023-01-27

## 2023-01-27 RX ORDER — NALOXONE HYDROCHLORIDE 0.4 MG/ML
0.2 INJECTION, SOLUTION INTRAMUSCULAR; INTRAVENOUS; SUBCUTANEOUS
Status: DISCONTINUED | OUTPATIENT
Start: 2023-01-27 | End: 2023-01-27

## 2023-01-27 RX ORDER — NALOXONE HYDROCHLORIDE 0.4 MG/ML
0.4 INJECTION, SOLUTION INTRAMUSCULAR; INTRAVENOUS; SUBCUTANEOUS
Status: DISCONTINUED | OUTPATIENT
Start: 2023-01-27 | End: 2023-01-29 | Stop reason: HOSPADM

## 2023-01-27 RX ORDER — DEXAMETHASONE SODIUM PHOSPHATE 4 MG/ML
INJECTION, SOLUTION INTRA-ARTICULAR; INTRALESIONAL; INTRAMUSCULAR; INTRAVENOUS; SOFT TISSUE PRN
Status: DISCONTINUED | OUTPATIENT
Start: 2023-01-27 | End: 2023-01-27

## 2023-01-27 RX ORDER — NALOXONE HYDROCHLORIDE 0.4 MG/ML
0.2 INJECTION, SOLUTION INTRAMUSCULAR; INTRAVENOUS; SUBCUTANEOUS
Status: DISCONTINUED | OUTPATIENT
Start: 2023-01-27 | End: 2023-01-29 | Stop reason: HOSPADM

## 2023-01-27 RX ORDER — ONDANSETRON 2 MG/ML
4 INJECTION INTRAMUSCULAR; INTRAVENOUS EVERY 6 HOURS PRN
Status: DISCONTINUED | OUTPATIENT
Start: 2023-01-27 | End: 2023-01-29 | Stop reason: HOSPADM

## 2023-01-27 RX ORDER — ONDANSETRON 4 MG/1
4 TABLET, ORALLY DISINTEGRATING ORAL EVERY 6 HOURS PRN
Status: DISCONTINUED | OUTPATIENT
Start: 2023-01-27 | End: 2023-01-29 | Stop reason: HOSPADM

## 2023-01-27 RX ORDER — NALOXONE HYDROCHLORIDE 0.4 MG/ML
0.4 INJECTION, SOLUTION INTRAMUSCULAR; INTRAVENOUS; SUBCUTANEOUS
Status: DISCONTINUED | OUTPATIENT
Start: 2023-01-27 | End: 2023-01-27

## 2023-01-27 RX ORDER — PROPOFOL 10 MG/ML
INJECTION, EMULSION INTRAVENOUS PRN
Status: DISCONTINUED | OUTPATIENT
Start: 2023-01-27 | End: 2023-01-27

## 2023-01-27 RX ORDER — FENTANYL CITRATE 50 UG/ML
INJECTION, SOLUTION INTRAMUSCULAR; INTRAVENOUS PRN
Status: DISCONTINUED | OUTPATIENT
Start: 2023-01-27 | End: 2023-01-27

## 2023-01-27 RX ORDER — FLUMAZENIL 0.1 MG/ML
0.2 INJECTION, SOLUTION INTRAVENOUS
Status: ACTIVE | OUTPATIENT
Start: 2023-01-27 | End: 2023-01-28

## 2023-01-27 RX ORDER — SODIUM CHLORIDE, SODIUM LACTATE, POTASSIUM CHLORIDE, CALCIUM CHLORIDE 600; 310; 30; 20 MG/100ML; MG/100ML; MG/100ML; MG/100ML
INJECTION, SOLUTION INTRAVENOUS CONTINUOUS PRN
Status: DISCONTINUED | OUTPATIENT
Start: 2023-01-27 | End: 2023-01-27

## 2023-01-27 RX ORDER — HYDROMORPHONE HYDROCHLORIDE 1 MG/ML
0.4 INJECTION, SOLUTION INTRAMUSCULAR; INTRAVENOUS; SUBCUTANEOUS EVERY 5 MIN PRN
Status: DISCONTINUED | OUTPATIENT
Start: 2023-01-27 | End: 2023-01-27 | Stop reason: HOSPADM

## 2023-01-27 RX ORDER — LIDOCAINE HYDROCHLORIDE 20 MG/ML
INJECTION, SOLUTION INFILTRATION; PERINEURAL PRN
Status: DISCONTINUED | OUTPATIENT
Start: 2023-01-27 | End: 2023-01-27

## 2023-01-27 RX ORDER — ONDANSETRON 4 MG/1
4 TABLET, ORALLY DISINTEGRATING ORAL EVERY 30 MIN PRN
Status: DISCONTINUED | OUTPATIENT
Start: 2023-01-27 | End: 2023-01-27 | Stop reason: HOSPADM

## 2023-01-27 RX ORDER — FLUMAZENIL 0.1 MG/ML
0.2 INJECTION, SOLUTION INTRAVENOUS
Status: DISCONTINUED | OUTPATIENT
Start: 2023-01-27 | End: 2023-01-27

## 2023-01-27 RX ORDER — SODIUM CHLORIDE, SODIUM LACTATE, POTASSIUM CHLORIDE, CALCIUM CHLORIDE 600; 310; 30; 20 MG/100ML; MG/100ML; MG/100ML; MG/100ML
INJECTION, SOLUTION INTRAVENOUS CONTINUOUS
Status: DISCONTINUED | OUTPATIENT
Start: 2023-01-27 | End: 2023-01-27 | Stop reason: HOSPADM

## 2023-01-27 RX ORDER — FENTANYL CITRATE 50 UG/ML
50 INJECTION, SOLUTION INTRAMUSCULAR; INTRAVENOUS EVERY 5 MIN PRN
Status: DISCONTINUED | OUTPATIENT
Start: 2023-01-27 | End: 2023-01-27 | Stop reason: HOSPADM

## 2023-01-27 RX ORDER — ONDANSETRON 2 MG/ML
INJECTION INTRAMUSCULAR; INTRAVENOUS PRN
Status: DISCONTINUED | OUTPATIENT
Start: 2023-01-27 | End: 2023-01-27

## 2023-01-27 RX ORDER — FENTANYL CITRATE 50 UG/ML
25 INJECTION, SOLUTION INTRAMUSCULAR; INTRAVENOUS EVERY 5 MIN PRN
Status: DISCONTINUED | OUTPATIENT
Start: 2023-01-27 | End: 2023-01-27 | Stop reason: HOSPADM

## 2023-01-27 RX ORDER — MAGNESIUM SULFATE HEPTAHYDRATE 40 MG/ML
2 INJECTION, SOLUTION INTRAVENOUS ONCE
Status: COMPLETED | OUTPATIENT
Start: 2023-01-27 | End: 2023-01-27

## 2023-01-27 RX ORDER — HYDROMORPHONE HYDROCHLORIDE 1 MG/ML
0.2 INJECTION, SOLUTION INTRAMUSCULAR; INTRAVENOUS; SUBCUTANEOUS EVERY 5 MIN PRN
Status: DISCONTINUED | OUTPATIENT
Start: 2023-01-27 | End: 2023-01-27 | Stop reason: HOSPADM

## 2023-01-27 RX ORDER — ONDANSETRON 2 MG/ML
4 INJECTION INTRAMUSCULAR; INTRAVENOUS EVERY 30 MIN PRN
Status: DISCONTINUED | OUTPATIENT
Start: 2023-01-27 | End: 2023-01-27 | Stop reason: HOSPADM

## 2023-01-27 RX ADMIN — INSULIN ASPART 3 UNITS: 100 INJECTION, SOLUTION INTRAVENOUS; SUBCUTANEOUS at 23:40

## 2023-01-27 RX ADMIN — SODIUM CHLORIDE, POTASSIUM CHLORIDE, SODIUM LACTATE AND CALCIUM CHLORIDE: 600; 310; 30; 20 INJECTION, SOLUTION INTRAVENOUS at 15:25

## 2023-01-27 RX ADMIN — OCTREOTIDE ACETATE 50 MCG/HR: 200 INJECTION INTRAVENOUS at 01:27

## 2023-01-27 RX ADMIN — FENTANYL CITRATE 100 MCG: 50 INJECTION, SOLUTION INTRAMUSCULAR; INTRAVENOUS at 15:31

## 2023-01-27 RX ADMIN — POTASSIUM PHOSPHATE, MONOBASIC POTASSIUM PHOSPHATE, DIBASIC 9 MMOL: 224; 236 INJECTION, SOLUTION, CONCENTRATE INTRAVENOUS at 06:31

## 2023-01-27 RX ADMIN — DEXAMETHASONE SODIUM PHOSPHATE 4 MG: 4 INJECTION, SOLUTION INTRA-ARTICULAR; INTRALESIONAL; INTRAMUSCULAR; INTRAVENOUS; SOFT TISSUE at 15:40

## 2023-01-27 RX ADMIN — SUGAMMADEX 200 MG: 100 INJECTION, SOLUTION INTRAVENOUS at 17:11

## 2023-01-27 RX ADMIN — LIDOCAINE HYDROCHLORIDE 100 MG: 20 INJECTION, SOLUTION INFILTRATION; PERINEURAL at 15:33

## 2023-01-27 RX ADMIN — HUMAN ALBUMIN MICROSPHERES AND PERFLUTREN 6 ML: 10; .22 INJECTION, SOLUTION INTRAVENOUS at 07:21

## 2023-01-27 RX ADMIN — CEFTRIAXONE SODIUM 1 G: 1 INJECTION, POWDER, FOR SOLUTION INTRAMUSCULAR; INTRAVENOUS at 13:31

## 2023-01-27 RX ADMIN — LACTULOSE 20 G: 10 POWDER, FOR SOLUTION ORAL at 08:22

## 2023-01-27 RX ADMIN — Medication 50 MG: at 15:35

## 2023-01-27 RX ADMIN — PANTOPRAZOLE SODIUM 40 MG: 40 INJECTION, POWDER, FOR SOLUTION INTRAVENOUS at 08:11

## 2023-01-27 RX ADMIN — PROPOFOL 50 MG: 10 INJECTION, EMULSION INTRAVENOUS at 15:34

## 2023-01-27 RX ADMIN — SODIUM CHLORIDE, POTASSIUM CHLORIDE, SODIUM LACTATE AND CALCIUM CHLORIDE: 600; 310; 30; 20 INJECTION, SOLUTION INTRAVENOUS at 18:11

## 2023-01-27 RX ADMIN — ONDANSETRON 4 MG: 2 INJECTION INTRAMUSCULAR; INTRAVENOUS at 15:40

## 2023-01-27 RX ADMIN — MIDAZOLAM 2 MG: 1 INJECTION INTRAMUSCULAR; INTRAVENOUS at 15:27

## 2023-01-27 RX ADMIN — FLUOXETINE HYDROCHLORIDE 60 MG: 40 CAPSULE ORAL at 22:24

## 2023-01-27 RX ADMIN — RIFAXIMIN 550 MG: 550 TABLET ORAL at 08:22

## 2023-01-27 RX ADMIN — LEVOFLOXACIN 500 MG: 5 INJECTION, SOLUTION INTRAVENOUS at 15:31

## 2023-01-27 RX ADMIN — POLYETHYLENE GLYCOL 3350, SODIUM SULFATE ANHYDROUS, SODIUM BICARBONATE, SODIUM CHLORIDE, POTASSIUM CHLORIDE 2000 ML: 236; 22.74; 6.74; 5.86; 2.97 POWDER, FOR SOLUTION ORAL at 08:22

## 2023-01-27 RX ADMIN — MAGNESIUM SULFATE IN WATER 2 G: 40 INJECTION, SOLUTION INTRAVENOUS at 14:10

## 2023-01-27 ASSESSMENT — ACTIVITIES OF DAILY LIVING (ADL)
ADLS_ACUITY_SCORE: 45
ADLS_ACUITY_SCORE: 45
ADLS_ACUITY_SCORE: 49
ADLS_ACUITY_SCORE: 45
ADLS_ACUITY_SCORE: 41
ADLS_ACUITY_SCORE: 49
ADLS_ACUITY_SCORE: 45
ADLS_ACUITY_SCORE: 41
ADLS_ACUITY_SCORE: 45
ADLS_ACUITY_SCORE: 37
ADLS_ACUITY_SCORE: 45
ADLS_ACUITY_SCORE: 45

## 2023-01-27 NOTE — PROGRESS NOTES
[facility]    Transfer Acceptance Note - Medicine Service, DANIELLA TEAM 1       Date of Admission:  1/26/2023    Assessment & Plan   Dillon Salter is a 27 year old male admitted on 1/26/2023. He is a 27 year old male with PMH of Asperger's, alcoholic cirrhosis (diagnosed 4/2022), esophageal varices (banded 5/2022), and hepatic encephalopathy currently undergoing liver transplant evaluation at [facility] who was transferred from [facility] on 1/26/23 for lower GI bleed 2/2 rectal varices vs hemorrhoids requiring advanced GI intervention.      Changes Today:  -EGD + Colonoscopy today as per GI  -Hgb and bleeding stable.   -Will resume insulin once PO.  -Continuing cares as below       # Decompensated alcoholic cirrhosis  # Esophageal varices s/p banding  # Bleeding rectal hemorrhoids vs. Varices  # Hepatic Encephalopathy  Patient transferred to from [facility] to [facility] for lower GI bleed likely 2/2 rectal hemorrhoids requiring advanced GI intervention. CTA 1/25 showed dilated perirectal vessels concerning for bleeding hemorrhoids. Hgb decreased from 8.2 to 6.7 requiring 2 units pRBCs --> improved to 7.9 on admission. Patient is undergoing liver transplant eval at [facility] (Dr. Wood). Per chart review, has not had alcohol since 4/6/22. Patient was treated for hepatic encephalopathy at [facility] from 1/21-1/26 with improving mentation per chart review. On admission, patient is alert and oriented x 3. Mild asterixis on exam.   - GI Consulted: rectal bleeding, clears okay for now, golytely this evening, NPO aftermidnight  - Hgb, platelet and fibrinogen q 8 hours  - Cont Octreotide   - Cont PPI BID  - HOLD PTA spironolactone given NPO, monitor BP  - Rifaximin 550 mg BID  - Continue Lactulose 20g TID to 3 BMs daily     MELD-Na score: 26 at 1/27/2023  3:47 AM  MELD score: 22 at 1/27/2023  3:47 AM  Calculated from:  Serum Creatinine: 0.65 mg/dL (Using min of 1 mg/dL) at  1/27/2023  3:47 AM  Serum Sodium: 131 mmol/L at 1/27/2023  3:47 AM  Total Bilirubin: 10.1 mg/dL at 1/27/2023  3:47 AM  INR(ratio): 1.87 at 1/26/2023  4:04 AM  Age: 27 years     #Leukocytosis   Patient had upward trending WBC since 1/21 to 18.0, now down to 15.1 on admission. Outside hospital started Zosyn on 1/25 given leukocytosis. NGTD on blood cultures. CTA 1/25 showed focal ground glass opacity in left lower lobe which could possibly reflect infectious process. However, lower suspicion given Spo2 in mid-high 90s on RA, no other evidence of respiratory infection.   - Cont Zosyn (1/25-1/26)  - Stop zosyn, start ceftriaxone    # Acute on Chronic Anemia  # Thrombocytopenia  Hgb down to 6.2 1/25 due to bleeding from rectum likely 2/2 hemorrhoids vs. rectal varices. Hgb improved to 7.9 s/p 2 units pRBCs. Continue to monitor for signs of bleeding.   - Q8h Hgb checks     #Systolic Murmur  Found on Exam today. Echo on 7/2022 w/o valvular findings. HDS.  -Echo ordered     # Hyponatremia, improved  Hyponatremia to 129, likely 2/2 liver cirrhosis.   -CTM     # Hypomagnesemia  1.3 on admission  -Replace as needed      # T2DM  BG decreased to 65 on arrival. Last A1c 5.4 in 7/2022.   - Started D5 0.45% NS given NPO  - hypoglycemia protocol  - LDSSI    #Anxiety/depression   -Cont PTA Fluoxetine         Diet: Snacks/Supplements Adult: Other; PRN; Between Meals  NPO for Medical/Clinical Reasons Except for: Meds, Ice Chips    DVT Prophylaxis: Pneumatic Compression Devices  Negron Catheter: Not present  Fluids: PO  Lines: None     Cardiac Monitoring: ACTIVE order. Indication: ICU  Code Status: Full Code      Clinically Significant Risk Factors         # Hyponatremia: Lowest Na = 129 mmol/L in last 2 days, will monitor as appropriate    # Hypomagnesemia: Lowest Mg = 1.3 mg/dL in last 2 days, will replace as needed   # Hypoalbuminemia: Lowest albumin = 2.8 g/dL at 1/27/2023  3:47 AM, will monitor as appropriate   # Thrombocytopenia:  Lowest platelets = 106 in last 2 days, will monitor for bleeding          # Obesity: Estimated body mass index is 30.14 kg/m  as calculated from the following:    Height as of this encounter: 1.524 m (5').    Weight as of this encounter: 70 kg (154 lb 5.2 oz)., PRESENT ON ADMISSION  # Severe Malnutrition: based on nutrition assessment, PRESENT ON ADMISSION       Disposition Plan     Expected Discharge Date: 01/28/2023      Destination: home with family;home with help/services          The patient's care was discussed with the Attending Physician, Dr. Carrasco.    German Velez Reyes, MD, PhD  Medicine Service, [facility] TEAM [address] [place] [address]  Securely message with [employer] (more info)  Text page via [facility] Paging/Directory   See signed in provider for up to date coverage information  ______________________________________________________________________    Interval History   Nursing notes reviewed. NAEO. Taken to EGD + Colonoscopy today. No major complaints. Having a hard time with drinking the entire GoLytely prep.     Physical Exam   Vital Signs: Temp: 99  F (37.2  C) Temp src: Oral BP: 121/62 Pulse: 88   Resp: 16 SpO2: 99 % O2 Device: None (Room air) Oxygen Delivery: 2 LPM  Weight: 154 lbs 5.15 oz    GEN: Lethargic but awakens easily, not in distress, icteric appearing   EYES: PERRL, Anicteric sclera.   HEENT:  Normocephalic, atraumatic, trachea midline, Pupils PERRLA  CV: RRR, no gallops, rubs, or murmurs  PULM/CHEST: Clear breath sounds bilaterally without rhonchi, crackles or wheeze, symmetric chest rise  GI: Large abdomen, normal bowel sounds, soft, non-tender, no rebound tenderness or guarding, no masses, denies any pain.  EXTREMITIES: No peripheral edema, warm and dry, moving all extremities, peripheral pulses intact  NEURO: No motor-sensory deficits noted, alert and orientated x 4.   SKIN: No rashes, sores or ulcerations  PSYCH:  Affect: appropriate      Medical Decision  Making       Please see A&P for additional details of medical decision making.      Data     I have personally reviewed the following data over the past 24 hrs:    13.8 (H)  \   8.5 (L)   / 117 (L)     131 (L) 98 11.7 /  143 (H)   3.9 25 0.65 (L) \       ALT: 26 AST: 63 (H) AP: 82 TBILI: 10.1 (H)   ALB: 2.8 (L) TOT PROTEIN: 5.1 (L) LIPASE: N/A       INR:  N/A PTT:  N/A   D-dimer:  N/A Fibrinogen:  185       Imaging results reviewed over the past 24 hrs:   Recent Results (from the past 24 hour(s))   Echocardiogram Complete   Result Value    LVEF  60-65%    Narrative    [id number]  [id number]  [id number]  [id number]^VELEZ REYES^[facility],[place]  Echocardiography Laboratory  [address] [place] [id number]     Name: EYAL ROONEY  MRN: [id number]  : 1995  Study Date: 2023 06:56 AM  Age: 27 yrs  Gender: Male  Patient [facility]  Reason For Study: Cardiac Murmur  Ordering Physician: VELEZ REYES, GERMAN  Referring Physician: LAUREL ENRIQUEZ  Performed By: Marquise Guadalupe     BSA: 1.7 m2  Height: 60 in  Weight: 154 lb  HR: 91  BP: 139/75 mmHg  ______________________________________________________________________________  Procedure  Complete Portable Echo Adult. Contrast Optison. Echocardiogram with two-  dimensional, color and spectral Doppler performed. Adequate quality two-  dimensional was performed and interpreted. Patient was given 6 ml mixture of 3  ml Optison and 6 ml saline. 3 ml wasted.  ______________________________________________________________________________  Interpretation Summary  No pathologic basis for murmur identified.  Global and regional left ventricular function is normal with an EF of 60-65%.  Right ventricular function, chamber size, wall motion, and thickness are  normal.  No significant valvular abnormalities were noted.  This study was compared with the study from 22: There has been no  change.  ______________________________________________________________________________  Left Ventricle  Global and regional left ventricular function is normal with an EF of 60-65%.  Left ventricular size is normal. Left ventricular wall thickness is normal.  Left ventricular diastolic function is normal. Diastolic Doppler findings  (E/E' ratio and/or other parameters) suggest left ventricular filling  pressures are normal. Abnormal non-specific septal motion is present.     Right Ventricle  Right ventricular function, chamber size, wall motion, and thickness are  normal.     Atria  Both atria appear normal.     Mitral Valve  The mitral valve is normal. Trace mitral insufficiency is present.     Aortic Valve  Aortic valve is normal in structure and function. The aortic valve is  tricuspid.     Tricuspid Valve  The tricuspid valve is normal. Trace tricuspid insufficiency is present.  Pulmonary artery systolic pressure is normal. The right ventricular systolic  pressure is approximated at 23.3 mmHg plus the right atrial pressure.     Pulmonic Valve  The pulmonic valve is normal. Trace pulmonic insufficiency is present.     Vessels  The aorta root is normal. The thoracic aorta is normal. The pulmonary artery  cannot be assessed. The inferior vena cava was normal in size with preserved  respiratory variability. IVC diameter <2.1 cm collapsing >50% with sniff  suggests a normal RA pressure of 3 mmHg.     Pericardium  No pericardial effusion is present.     Compared to Previous Study  This study was compared with the study from 7/29/22 . There has been no  change.  ______________________________________________________________________________  MMode/2D Measurements & Calculations  IVSd: 1.1 cm     LVIDd: 5.0 cm  LVIDs: 2.9 cm  LVPWd: 1.0 cm  FS: 41.1 %  LV mass(C)d: 199.1 grams  LV mass(C)dI: 119.2 grams/m2  LVOT diam: 1.9 cm  LVOT area: 2.8 cm2  LA Volume Index (BP): 37.5 ml/m2  RWT: 0.42  TAPSE: 3.0 cm     Doppler  Measurements & Calculations  MV E max rebeca: 93.6 cm/sec  MV A max rebeca: 85.3 cm/sec  MV E/A: 1.1     MV dec slope: 589.7 cm/sec2  MV dec time: 0.16 sec  PA V2 max: 208.7 cm/sec  PA max P.4 mmHg  PA acc time: 0.15 sec  TR max rebeca: 241.4 cm/sec  TR max P.3 mmHg  E/E' av.4  Lateral E/e': 5.0  Medial E/e': 5.7     ______________________________________________________________________________  Report approved by: Sofya Morrison 2023 08:43 AM

## 2023-01-27 NOTE — ANESTHESIA PROCEDURE NOTES
Airway       Patient location during procedure: OR       Procedure Start/Stop Times: 1/27/2023 3:38 PM  Staff -        CRNA: Schlatter, Charles Patrick, APRN CRNA       Performed By: CRNA  Consent for Airway        Urgency: elective  Indications and Patient Condition       Indications for airway management: jalen-procedural       Induction type:intravenous       Mask difficulty assessment: 1 - vent by mask    Final Airway Details       Final airway type: endotracheal airway       Successful airway: ETT - single  Endotracheal Airway Details        ETT size (mm): 7.5       Cuffed: yes       Successful intubation technique: direct laryngoscopy       DL Blade Type: MAC 3       Grade View of Cords: 1       Adjucts: stylet       Position: Right       Measured from: gums/teeth       Secured at (cm): 22       Bite Block used: bite block.    Post intubation assessment        Placement verified by: capnometry, equal breath sounds and chest rise        Number of other approaches attempted: 0       Secured with: pink tape       Ease of procedure: easy       Dentition: Intact    Medication(s) Administered   Medication Administration Time: 1/27/2023 3:38 PM

## 2023-01-27 NOTE — PLAN OF CARE
Major Shift Events:  Oriented x4, PERRLA, moves all extremities but quite weak. On room air when awake, 2L when sleeping.  Incontinent of urine/stool, stool has been yellow/brown and now liquid and clear. Patient has had difficulty finishing Golytely. RN encouraged patient consistently throughout night but pt gets overwhelmed and dry heaves with too much of the liquid. Paged team 2x regarding inability to finish whole batch of Golytely. NPO of anything else since midnight. Octreotide running @ 50  Plan: scope with GI today and tx to floor

## 2023-01-27 NOTE — ANESTHESIA CARE TRANSFER NOTE
Patient: Dillon Salter    Procedure: Procedure(s):  ULTRASOUND, LOWER GI TRACT, ENDOSCOPIC with glueing  Colonoscopy  Esophagoscopy, gastroscopy, duodenoscopy (EGD), combined       Diagnosis: Gastrointestinal hemorrhage associated with anorectal source [K62.5]  Diagnosis Additional Information: No value filed.    Anesthesia Type:   General     Note:    Oropharynx: oropharynx clear of all foreign objects and spontaneously breathing  Level of Consciousness: awake  Oxygen Supplementation: nasal cannula    Independent Airway: airway patency satisfactory and stable  Dentition: dentition unchanged  Vital Signs Stable: post-procedure vital signs reviewed and stable  Report to RN Given: handoff report given  Patient transferred to: PACU    Handoff Report: Identifed the Patient, Identified the Reponsible Provider, Reviewed the pertinent medical history, Discussed the surgical course, Reviewed Intra-OP anesthesia mangement and issues during anesthesia, Set expectations for post-procedure period and Allowed opportunity for questions and acknowledgement of understanding      Vitals:  Vitals Value Taken Time   /66 01/27/23 1751   Temp 36.5    Pulse 95 01/27/23 1756   Resp 19 01/27/23 1756   SpO2 97 % 01/27/23 1756   Vitals shown include unvalidated device data.    Electronically Signed By: Charles Patrick Schlatter, APRN CRNA  January 27, 2023  5:56 PM

## 2023-01-27 NOTE — PLAN OF CARE
Major Shift Events:  Patient alert and oriented, follows commands, MCKEON though weak. Satting well on room air. SR with HR 70s-90s, BP stable, afebrile. Multiple BMs throughout shift, but no blood or bleeding noted from rectum. Incontinent of urine. Patient sent with flyer/transport to OR for procedure, will be going to 6B following procedure. All belongings brought to 6B room 6-219 and mother notified of happenings.     For vital signs and complete assessments, please see documentation flowsheets.

## 2023-01-27 NOTE — BRIEF OP NOTE
[facility]    Brief Operative Note    Pre-operative diagnosis: Gastrointestinal hemorrhage associated with anorectal source [K62.5]  Post-operative diagnosis Same as pre-operative diagnosis    Procedure: Procedure(s):  ULTRASOUND, LOWER GI TRACT, ENDOSCOPIC with glueing  Colonoscopy  Esophagoscopy, gastroscopy, duodenoscopy (EGD), combined  Surgeon: Surgeon(s) and Role:     * Osman Montoya MD - Primary  Anesthesia: General   Estimated Blood Loss: Less than 10 ml    Drains: None  Specimens: * No specimens in log *  Findings:       EGD- Recently placed bands in the distal esophagus with associated decompressed varices and post-banding ulceration. No old blood or active bleeding. Normal stomach and duodenum.    Colonoscopy/EUS- No old blood or active bleeding seen throughout. Normal ileum. Diffusely edematous colon consistent with portal colopathy. Rectal varices noted. No stigmata seen. Varices measured 4 mm in maximal diameter. No hemorrhoids. Inflow tract of rectal varix targeted and punctured with a 19g Echotip needle. 1 ml of histoacryl:cyanoacrylate mixture used to embolize. Second site targeted measuring only 3 mm and punctured followed by embolization with 1 ml of histoacryl:cyanoacrylate mixture although most ended up within the lumen of the rectum but nevertheless adequately embolized varix. Subsequent endoscopic exam showed no active bleeding.  Trace pelvic ascites noted on EUS.    Unclear if rectal varices were truly the source of recent hematochezia given no high risk stigmata and really quite small size/burden of rectal varices. Alternative explanation would be a post-banding ulcer bleed that self-resolved. CTA showed enhancement (filling) of rectal varices but not extravasation.     Complications: None.  Implants: * No implants in log *    Recommendations:  - Return to the floor for ongoing care  - Can resume diet  - I would not recommend committing to  repeated subsequent rectal variceal surveillance/embolization/eradiation given current clinical picture unless more compelling evidence for recurrent rectal variceal bleeding.  - Hepatology to continue following

## 2023-01-28 LAB
ALBUMIN SERPL BCG-MCNC: 2.7 G/DL (ref 3.5–5.2)
ALP SERPL-CCNC: 87 U/L (ref 40–129)
ALT SERPL W P-5'-P-CCNC: 25 U/L (ref 10–50)
ANION GAP SERPL CALCULATED.3IONS-SCNC: 8 MMOL/L (ref 7–15)
AST SERPL W P-5'-P-CCNC: 57 U/L (ref 10–50)
BILIRUB SERPL-MCNC: 8.3 MG/DL
BUN SERPL-MCNC: 19.7 MG/DL (ref 6–20)
CALCIUM SERPL-MCNC: 8 MG/DL (ref 8.6–10)
CHLORIDE SERPL-SCNC: 97 MMOL/L (ref 98–107)
CREAT SERPL-MCNC: 0.89 MG/DL (ref 0.67–1.17)
DEPRECATED HCO3 PLAS-SCNC: 24 MMOL/L (ref 22–29)
ERYTHROCYTE [DISTWIDTH] IN BLOOD BY AUTOMATED COUNT: 20.6 % (ref 10–15)
FIBRINOGEN PPP-MCNC: 164 MG/DL (ref 170–490)
GFR SERPL CREATININE-BSD FRML MDRD: >90 ML/MIN/1.73M2
GLUCOSE BLDC GLUCOMTR-MCNC: 185 MG/DL (ref 70–99)
GLUCOSE BLDC GLUCOMTR-MCNC: 201 MG/DL (ref 70–99)
GLUCOSE BLDC GLUCOMTR-MCNC: 209 MG/DL (ref 70–99)
GLUCOSE BLDC GLUCOMTR-MCNC: 223 MG/DL (ref 70–99)
GLUCOSE BLDC GLUCOMTR-MCNC: 271 MG/DL (ref 70–99)
GLUCOSE BLDC GLUCOMTR-MCNC: 323 MG/DL (ref 70–99)
GLUCOSE BLDC GLUCOMTR-MCNC: 328 MG/DL (ref 70–99)
GLUCOSE BLDC GLUCOMTR-MCNC: 338 MG/DL (ref 70–99)
GLUCOSE BLDC GLUCOMTR-MCNC: 355 MG/DL (ref 70–99)
GLUCOSE BLDC GLUCOMTR-MCNC: 381 MG/DL (ref 70–99)
GLUCOSE BLDC GLUCOMTR-MCNC: 384 MG/DL (ref 70–99)
GLUCOSE BLDC GLUCOMTR-MCNC: 423 MG/DL (ref 70–99)
GLUCOSE BLDC GLUCOMTR-MCNC: 426 MG/DL (ref 70–99)
GLUCOSE BLDC GLUCOMTR-MCNC: 435 MG/DL (ref 70–99)
GLUCOSE BLDC GLUCOMTR-MCNC: 446 MG/DL (ref 70–99)
GLUCOSE BLDC GLUCOMTR-MCNC: 463 MG/DL (ref 70–99)
GLUCOSE BLDC GLUCOMTR-MCNC: 469 MG/DL (ref 70–99)
GLUCOSE SERPL-MCNC: 266 MG/DL (ref 70–99)
HBA1C MFR BLD: 5.6 %
HCT VFR BLD AUTO: 22.7 % (ref 40–53)
HGB BLD-MCNC: 7.5 G/DL (ref 13.3–17.7)
MAGNESIUM SERPL-MCNC: 2 MG/DL (ref 1.7–2.3)
MCH RBC QN AUTO: 31.8 PG (ref 26.5–33)
MCHC RBC AUTO-ENTMCNC: 33 G/DL (ref 31.5–36.5)
MCV RBC AUTO: 96 FL (ref 78–100)
PHOSPHATE SERPL-MCNC: 2.5 MG/DL (ref 2.5–4.5)
PLATELET # BLD AUTO: 114 10E3/UL (ref 150–450)
PLPETH BLD-MCNC: 35 NG/ML
POPETH BLD-MCNC: 45 NG/ML
POTASSIUM SERPL-SCNC: 4.9 MMOL/L (ref 3.4–5.3)
PROT SERPL-MCNC: 5.2 G/DL (ref 6.4–8.3)
RBC # BLD AUTO: 2.36 10E6/UL (ref 4.4–5.9)
SODIUM SERPL-SCNC: 129 MMOL/L (ref 136–145)
UPPER GI ENDOSCOPY: NORMAL
WBC # BLD AUTO: 9.9 10E3/UL (ref 4–11)

## 2023-01-28 PROCEDURE — 85384 FIBRINOGEN ACTIVITY: CPT | Performed by: INTERNAL MEDICINE

## 2023-01-28 PROCEDURE — 120N000003 HC R&B IMCU UMMC

## 2023-01-28 PROCEDURE — 36415 COLL VENOUS BLD VENIPUNCTURE: CPT | Performed by: INTERNAL MEDICINE

## 2023-01-28 PROCEDURE — 250N000012 HC RX MED GY IP 250 OP 636 PS 637

## 2023-01-28 PROCEDURE — C9113 INJ PANTOPRAZOLE SODIUM, VIA: HCPCS | Performed by: INTERNAL MEDICINE

## 2023-01-28 PROCEDURE — 83036 HEMOGLOBIN GLYCOSYLATED A1C: CPT

## 2023-01-28 PROCEDURE — 258N000003 HC RX IP 258 OP 636: Performed by: INTERNAL MEDICINE

## 2023-01-28 PROCEDURE — 99233 SBSQ HOSP IP/OBS HIGH 50: CPT | Mod: GC | Performed by: INTERNAL MEDICINE

## 2023-01-28 PROCEDURE — 250N000013 HC RX MED GY IP 250 OP 250 PS 637: Performed by: INTERNAL MEDICINE

## 2023-01-28 PROCEDURE — 250N000009 HC RX 250

## 2023-01-28 PROCEDURE — 250N000011 HC RX IP 250 OP 636: Performed by: INTERNAL MEDICINE

## 2023-01-28 PROCEDURE — 85027 COMPLETE CBC AUTOMATED: CPT | Performed by: INTERNAL MEDICINE

## 2023-01-28 PROCEDURE — 250N000013 HC RX MED GY IP 250 OP 250 PS 637

## 2023-01-28 PROCEDURE — 80053 COMPREHEN METABOLIC PANEL: CPT | Performed by: INTERNAL MEDICINE

## 2023-01-28 PROCEDURE — 83735 ASSAY OF MAGNESIUM: CPT | Performed by: INTERNAL MEDICINE

## 2023-01-28 PROCEDURE — 250N000011 HC RX IP 250 OP 636: Mod: JA | Performed by: INTERNAL MEDICINE

## 2023-01-28 PROCEDURE — 84100 ASSAY OF PHOSPHORUS: CPT | Performed by: INTERNAL MEDICINE

## 2023-01-28 PROCEDURE — 36415 COLL VENOUS BLD VENIPUNCTURE: CPT

## 2023-01-28 RX ORDER — SODIUM CHLORIDE 9 MG/ML
INJECTION, SOLUTION INTRAVENOUS CONTINUOUS
Status: DISCONTINUED | OUTPATIENT
Start: 2023-01-28 | End: 2023-01-28

## 2023-01-28 RX ORDER — PANTOPRAZOLE SODIUM 40 MG/1
40 TABLET, DELAYED RELEASE ORAL DAILY
Status: DISCONTINUED | OUTPATIENT
Start: 2023-01-29 | End: 2023-01-29 | Stop reason: HOSPADM

## 2023-01-28 RX ORDER — BUMETANIDE 0.5 MG/1
0.5 TABLET ORAL DAILY
Status: DISCONTINUED | OUTPATIENT
Start: 2023-01-28 | End: 2023-01-29

## 2023-01-28 RX ORDER — FOLIC ACID 1 MG/1
1 TABLET ORAL DAILY
Status: DISCONTINUED | OUTPATIENT
Start: 2023-01-28 | End: 2023-01-29 | Stop reason: HOSPADM

## 2023-01-28 RX ORDER — GABAPENTIN 100 MG/1
100 CAPSULE ORAL DAILY PRN
Status: DISCONTINUED | OUTPATIENT
Start: 2023-01-28 | End: 2023-01-28

## 2023-01-28 RX ORDER — SPIRONOLACTONE 25 MG/1
50 TABLET ORAL AT BEDTIME
Status: DISCONTINUED | OUTPATIENT
Start: 2023-01-28 | End: 2023-01-29 | Stop reason: HOSPADM

## 2023-01-28 RX ORDER — DEXTROSE MONOHYDRATE 25 G/50ML
25-50 INJECTION, SOLUTION INTRAVENOUS
Status: DISCONTINUED | OUTPATIENT
Start: 2023-01-28 | End: 2023-01-29 | Stop reason: HOSPADM

## 2023-01-28 RX ORDER — NICOTINE POLACRILEX 4 MG
15-30 LOZENGE BUCCAL
Status: DISCONTINUED | OUTPATIENT
Start: 2023-01-28 | End: 2023-01-29 | Stop reason: HOSPADM

## 2023-01-28 RX ORDER — ONDANSETRON 4 MG/1
4 TABLET, ORALLY DISINTEGRATING ORAL EVERY 6 HOURS PRN
Status: DISCONTINUED | OUTPATIENT
Start: 2023-01-28 | End: 2023-01-28

## 2023-01-28 RX ORDER — DEXTROSE MONOHYDRATE 25 G/50ML
25-50 INJECTION, SOLUTION INTRAVENOUS
Status: DISCONTINUED | OUTPATIENT
Start: 2023-01-28 | End: 2023-01-28

## 2023-01-28 RX ORDER — LACTULOSE 10 G/15ML
10 SOLUTION ORAL
Status: DISCONTINUED | OUTPATIENT
Start: 2023-01-28 | End: 2023-01-28

## 2023-01-28 RX ORDER — PIPERACILLIN SODIUM, TAZOBACTAM SODIUM 3; .375 G/15ML; G/15ML
3.38 INJECTION, POWDER, LYOPHILIZED, FOR SOLUTION INTRAVENOUS EVERY 8 HOURS
Status: DISCONTINUED | OUTPATIENT
Start: 2023-01-28 | End: 2023-01-28

## 2023-01-28 RX ORDER — ONDANSETRON 2 MG/ML
4 INJECTION INTRAMUSCULAR; INTRAVENOUS EVERY 6 HOURS PRN
Status: DISCONTINUED | OUTPATIENT
Start: 2023-01-28 | End: 2023-01-28

## 2023-01-28 RX ORDER — CHLORHEXIDINE GLUCONATE ORAL RINSE 1.2 MG/ML
15 SOLUTION DENTAL 3 TIMES DAILY PRN
Status: DISCONTINUED | OUTPATIENT
Start: 2023-01-28 | End: 2023-01-29 | Stop reason: HOSPADM

## 2023-01-28 RX ORDER — DEXTROSE MONOHYDRATE 100 MG/ML
INJECTION, SOLUTION INTRAVENOUS CONTINUOUS PRN
Status: DISCONTINUED | OUTPATIENT
Start: 2023-01-28 | End: 2023-01-28

## 2023-01-28 RX ORDER — BUMETANIDE 1 MG/1
1 TABLET ORAL DAILY
Status: DISCONTINUED | OUTPATIENT
Start: 2023-01-28 | End: 2023-01-28

## 2023-01-28 RX ORDER — NICOTINE POLACRILEX 4 MG
15-30 LOZENGE BUCCAL
Status: DISCONTINUED | OUTPATIENT
Start: 2023-01-28 | End: 2023-01-28

## 2023-01-28 RX ORDER — MULTIVITAMIN,THERAPEUTIC
1 TABLET ORAL DAILY
Status: DISCONTINUED | OUTPATIENT
Start: 2023-01-28 | End: 2023-01-29 | Stop reason: HOSPADM

## 2023-01-28 RX ADMIN — FLUOXETINE HYDROCHLORIDE 60 MG: 40 CAPSULE ORAL at 21:57

## 2023-01-28 RX ADMIN — OCTREOTIDE ACETATE 50 MCG/HR: 200 INJECTION INTRAVENOUS at 04:38

## 2023-01-28 RX ADMIN — CEFTRIAXONE SODIUM 1 G: 1 INJECTION, POWDER, FOR SOLUTION INTRAMUSCULAR; INTRAVENOUS at 14:46

## 2023-01-28 RX ADMIN — PANTOPRAZOLE SODIUM 40 MG: 40 INJECTION, POWDER, FOR SOLUTION INTRAVENOUS at 08:10

## 2023-01-28 RX ADMIN — RIFAXIMIN 550 MG: 550 TABLET ORAL at 08:10

## 2023-01-28 RX ADMIN — RIFAXIMIN 550 MG: 550 TABLET ORAL at 20:03

## 2023-01-28 RX ADMIN — THERA TABS 1 TABLET: TAB at 10:19

## 2023-01-28 RX ADMIN — SPIRONOLACTONE 50 MG: 25 TABLET, FILM COATED ORAL at 21:57

## 2023-01-28 RX ADMIN — HUMAN INSULIN 5.5 UNITS/HR: 100 INJECTION, SOLUTION SUBCUTANEOUS at 02:43

## 2023-01-28 RX ADMIN — INSULIN GLARGINE 10 UNITS: 100 INJECTION, SOLUTION SUBCUTANEOUS at 09:55

## 2023-01-28 RX ADMIN — LACTULOSE 20 G: 10 POWDER, FOR SOLUTION ORAL at 08:09

## 2023-01-28 RX ADMIN — BUMETANIDE 0.5 MG: 0.5 TABLET ORAL at 14:53

## 2023-01-28 RX ADMIN — FOLIC ACID 1 MG: 1 TABLET ORAL at 10:19

## 2023-01-28 ASSESSMENT — ACTIVITIES OF DAILY LIVING (ADL)
ADLS_ACUITY_SCORE: 37
ADLS_ACUITY_SCORE: 37
ADLS_ACUITY_SCORE: 33
ADLS_ACUITY_SCORE: 37
ADLS_ACUITY_SCORE: 33
ADLS_ACUITY_SCORE: 37
ADLS_ACUITY_SCORE: 33

## 2023-01-28 NOTE — PROGRESS NOTES
Transfer  Transferred from: PACU  Via: bed  Reason for transfer: Pt appropriate for 6B- postop  Family: Aware of transfer - mother at bedside. OK to spend the night  Belongings: With mother  Chart: Received with pt  Medications: None recieved  Code Status verified on armband: yes  2 RN Skin Assessment Completed By: Mark MONTAGUE RN & Geetha NEWBERRY  Med rec completed: yes  Bed surface reassessed with algorithm and charted: yes  New bed surface ordered: no  Suction/Ambu bag/Flowmeter at bedside: yes    Report received from: PACU RN  Pt status: Stable    Mark Moctezuma, RN on 1/27/2023 at 8:35 PM

## 2023-01-28 NOTE — ANESTHESIA POSTPROCEDURE EVALUATION
Patient: Dillon Salter    Procedure: Procedure(s):  ULTRASOUND, LOWER GI TRACT, ENDOSCOPIC with glueing  Colonoscopy  Esophagoscopy, gastroscopy, duodenoscopy (EGD), combined       Anesthesia Type:  General    Note:  Disposition: Admission   Postop Pain Control: Uneventful            Sign Out: Well controlled pain   PONV:    Neuro/Psych: Uneventful            Sign Out: Acceptable/Baseline neuro status   Airway/Respiratory: Uneventful            Sign Out: Acceptable/Baseline resp. status   CV/Hemodynamics: Uneventful            Sign Out: Acceptable CV status; No obvious hypovolemia; No obvious fluid overload   Other NRE: NONE   DID A NON-ROUTINE EVENT OCCUR?            Last vitals:  Vitals Value Taken Time   /60 01/27/23 1845   Temp 36.8  C (98.2  F) 01/27/23 1845   Pulse 84 01/27/23 1858   Resp 12 01/27/23 1858   SpO2 97 % 01/27/23 1858   Vitals shown include unvalidated device data.    Electronically Signed By: Laya Matthews MD  January 27, 2023  6:59 PM

## 2023-01-28 NOTE — PLAN OF CARE
Neuro: A&Ox4. Can be forgetful at times but easily re-orientated.    Cardiac: Tele orders. SR. Afebrile overnight. Soft BP's overnight (team notified and aware) - SBP: 90's-120's, DBP: 40's-50's. MAP's sustained >65.   Respiratory: Sating >93% on 1L NC.  GI/: Adequate urine output - incontinent at times. No BM this shift.  Diet/appetite: Tolerating regular diet with carb coverage insulin. Eating well.  Activity: Stand by assist.  Pain: At acceptable level on current regimen. Denied pain throughout shift.  Skin: No new deficits noted. Blanchable redness on sacrum.  LDA's: 2 R PIV - both infusing. 1 L PIV - SL.    See provider notifications for significant events overnight.    Plan: Continue Q1H BG checks. Continue to encourage oral intake as tolerated. Continue with POC. Notify primary team with changes.     Mark Moctezuma RN on 1/28/2023 at 6:38 AM

## 2023-01-28 NOTE — PROGRESS NOTES
[facility]    Transfer Acceptance Note - Medicine Service, DANIELLA TEAM 1       Date of Admission:  1/26/2023    Assessment & Plan   Dillon Salter is a 27 year old male admitted on 1/26/2023. He has a PMH of Asperger's, alcoholic cirrhosis (diagnosed 4/2022), esophageal varices (banded 5/2022), and hepatic encephalopathy currently undergoing liver transplant evaluation at [facility] who was transferred from [facility] on 1/26/23 for lower GI bleed 2/2 rectal varices vs hemorrhoids requiring advanced GI intervention. He is now s/p another EGD and colonoscopy on 01/27 with findings of healing esophageal varices from EGD and banding at [facility], and very small rectal varices without high risk stigmata. He is well appearing and has not had further hematochezia. Adm c/b hyperglycemia after he started eating. He requires ongoing admission for management of his hyperglycemia, cirrhosis, and re-introducing is cirrhotic meds.       Changes Today:  - Discontinue insulin gtt started overnight and restart pta glargine at half the dose.   - Restart pta spirinolactone and bumex at half the dose  - continue CTX, end 01/30    T2DM c/b hyperglycemia   Last A1c 5.4 in 7/2022. He was hypoglycemic on admission. He had not eaten for several days. His pta glargine was held. He resumed diet on 01/27 is now hyperglycemic.   - Discontinue insulin gtt started overnight   - Start pta Glargine at half his dose (pta dose 20units BID), current plan: Glargine 10units BID. If his GB continues to be elevated, will increase to 15 units tonight   - hypoglycemia protocol  [facility]      Decompensated alcoholic cirrhosis c/b anemia, thrombocytopenia, Esophageal varices (s/p banding), and hepatic encephalopathy (resolved).  Hyponatremia 2/2 above   Hematochezia, improved   Patient transferred to from [facility] to [facility] for lower GI bleed requiring advanced GI intervention. CTA 1/25 showed dilated  perirectal vessels concerning for bleeding hemorrhoids vs. Rectal varices. Hgb decreased from 8.2 to 6.7 requiring 2 units pRBCs --> improved to 7.9 on admission. Patient is undergoing liver transplant eval at [facility] (Dr. Wood). Per chart review, has not had alcohol since 4/6/22. Patient was treated for hepatic encephalopathy at [facility] from 1/21-1/26 with improving mentation per chart review. On admission, patient is alert and oriented x 3. He is s/p EGD and colonoscopy on 01/27. Notable for recently placed bands in the distal esophagus with associated decompressed varices and post-banding ulceration. No old blood or active bleeding. Colonoscopy noted rectal varices without high risk stigmata, no old blood or active bleeding.   - GI following s/p EGD and colonoscopy 1/27. No need for surveillance unless more compelling evidence of recurrent rectal bleeding.   - discontinue octreotide gtt   - IV PPI -> PO   - restart PTA spironolactone and bumex at half dose. pta doses were 100 and 1mg respectivetly.   - Rifaximin 550 mg BID  - Continue Lactulose 20g TID to 3 BMs daily   - CTX for 5 days for SBP ppx (end 01/30)    MELD-Na score: 27 at 1/28/2023  5:41 AM  MELD score: 22 at 1/28/2023  5:41 AM  Calculated from:  Serum Creatinine: 0.89 mg/dL (Using min of 1 mg/dL) at 1/28/2023  5:41 AM  Serum Sodium: 129 mmol/L at 1/28/2023  5:41 AM  Total Bilirubin: 8.3 mg/dL at 1/28/2023  5:41 AM  INR(ratio): 1.87 at 1/26/2023  4:04 AM  Age: 27 years     Systolic Murmur  Noted on exam. Likely 2/2 acute bleed. Echo on 7/2022 w/o valvular findings. HDS. Echo 01/26 without valvular abnormalities.       ---------------------------------------chronic medical problems------------------------  Anxiety/depression   -Cont PTA Fluoxetine     -----------------------------------------------Resolved------------------------------------------------------------------------  Leukocytosis   Patient had upward trending WBC since 1/21 to 18.0 on 01/25,  now resovled on 01/28. Outside hospital started Zosyn on 1/25 given leukocytosis. NGTD on blood cultures. CTA 1/25 showed focal ground glass opacity in left lower lobe which could possibly reflect infectious process. However, lower suspicion given Spo2 in mid-high 90s on RA, no other evidence of respiratory infection.   - s/p Zosyn (1/25-1/26). On CTX for SBP prophylaxis         Diet: Snacks/Supplements Adult: Other; PRN; Between Meals  Advance Diet as Tolerated: Regular Diet Adult  Fluid restriction 1800 ML FLUID  Room Service    DVT Prophylaxis: Pneumatic Compression Devices  Negron Catheter: Not present  Fluids: PO  Lines: None     Cardiac Monitoring: None  Code Status: Full Code      Clinically Significant Risk Factors         # Hyponatremia: Lowest Na = 129 mmol/L in last 2 days, will monitor as appropriate    # Hypomagnesemia: Lowest Mg = 1.6 mg/dL in last 2 days, will replace as needed   # Hypoalbuminemia: Lowest albumin = 2.7 g/dL at 1/28/2023  5:41 AM, will monitor as appropriate   # Thrombocytopenia: Lowest platelets = 104 in last 2 days, will monitor for bleeding          # Obesity: Estimated body mass index is 31.9 kg/m  as calculated from the following:    Height as of this encounter: 1.524 m (5').    Weight as of this encounter: 74.1 kg (163 lb 5.8 oz)., PRESENT ON ADMISSION  # Severe Malnutrition: based on nutrition assessment, PRESENT ON ADMISSION       Disposition Plan      Expected Discharge Date: 01/31/2023      Destination: home with family;home with help/services  Discharge Comments: insulin gtt        The patient's care was discussed with the Attending Physician, Dr. Carrasco.    Perlita Corcoran MD, PhD  Medicine Service, [facility] TEAM [address] [place] [address]  Securely message with [employer] (more info)  Text page via [facility] Paging/Directory   See signed in provider for up to date coverage  information  ______________________________________________________________________    Interval History   Nursing notes reviewed. No overnight events. Doing well this AM. Denies any concerns. He does not have CP/SOB/Abd pain/nausea/vomiting. He has not had a bowel movement since the procedure. He reports eating a very large sandwich yesterday since he had not eaten in several days.     Physical Exam   Vital Signs: Temp: 99.1  F (37.3  C) Temp src: Oral BP: 123/65 Pulse: 84   Resp: 15 SpO2: 99 % O2 Device: None (Room air) Oxygen Delivery: 1 LPM  Weight: 163 lbs 5.77 oz    GEN: Awake, alert, no acute distress   HEENT: Sclera icteric   CV: RRR, systolic murmur, extremities perfused, no lower extremity edema noted   PULM: breathing comfortably on room air, CTAB   GI: +BS, distended, normal bowel sounds, soft, non-tender, no rebound tenderness  SKIN: spider angiomata on right chest   Neuro: AOX3, moving all extremities appropriately    Data     I have personally reviewed the following data over the past 24 hrs:    9.9  \   7.5 (L)   / 114 (L)     129 (L) 97 (L) 19.7 /  338 (H)   4.9 24 0.89 \       ALT: 25 AST: 57 (H) AP: 87 TBILI: 8.3 (H)   ALB: 2.7 (L) TOT PROTEIN: 5.2 (L) LIPASE: N/A       TSH: N/A T4: N/A A1C: 5.6       Procal: N/A CRP: N/A Lactic Acid: 1.4       INR:  N/A PTT:  N/A   D-dimer:  N/A Fibrinogen:  164 (L)       Imaging results reviewed over the past 24 hrs:   Recent Results (from the past 24 hour(s))   XR Surgery SIDDHARTHA L/T 5 Min Fluoro w Stills    Narrative    This exam was marked as non-reportable because it will not be read by a   radiologist or a [facility] non-radiologist provider.

## 2023-01-28 NOTE — PROVIDER NOTIFICATION
"0034: BG @ midnight was 366, correction dose of Novolog given (3 units). BG rechecked was 384.     New order for one time dose 5 units Novolog.    0207: \"2am . Insulin gtt or bolus Novolog?, also pt having softer BP's with DBP <60\"    New orders to initiate insulin gtt w/ q1h BG checks.    Mark Moctezuma RN on 1/28/2023 at 6:27 AM        "

## 2023-01-29 VITALS
DIASTOLIC BLOOD PRESSURE: 74 MMHG | WEIGHT: 164.24 LBS | RESPIRATION RATE: 18 BRPM | HEART RATE: 81 BPM | BODY MASS INDEX: 32.25 KG/M2 | HEIGHT: 60 IN | TEMPERATURE: 98.5 F | SYSTOLIC BLOOD PRESSURE: 136 MMHG | OXYGEN SATURATION: 93 %

## 2023-01-29 LAB
ALBUMIN SERPL BCG-MCNC: 2.9 G/DL (ref 3.5–5.2)
ALP SERPL-CCNC: 137 U/L (ref 40–129)
ALT SERPL W P-5'-P-CCNC: 28 U/L (ref 10–50)
ANION GAP SERPL CALCULATED.3IONS-SCNC: 8 MMOL/L (ref 7–15)
AST SERPL W P-5'-P-CCNC: 54 U/L (ref 10–50)
BILIRUB DIRECT SERPL-MCNC: 2.65 MG/DL (ref 0–0.3)
BILIRUB SERPL-MCNC: 7 MG/DL
BUN SERPL-MCNC: 26.2 MG/DL (ref 6–20)
CALCIUM SERPL-MCNC: 8.5 MG/DL (ref 8.6–10)
CHLORIDE SERPL-SCNC: 97 MMOL/L (ref 98–107)
CREAT SERPL-MCNC: 0.74 MG/DL (ref 0.67–1.17)
DEPRECATED HCO3 PLAS-SCNC: 23 MMOL/L (ref 22–29)
ERYTHROCYTE [DISTWIDTH] IN BLOOD BY AUTOMATED COUNT: 20.4 % (ref 10–15)
FIBRINOGEN PPP-MCNC: 174 MG/DL (ref 170–490)
GFR SERPL CREATININE-BSD FRML MDRD: >90 ML/MIN/1.73M2
GLUCOSE BLDC GLUCOMTR-MCNC: 225 MG/DL (ref 70–99)
GLUCOSE BLDC GLUCOMTR-MCNC: 273 MG/DL (ref 70–99)
GLUCOSE BLDC GLUCOMTR-MCNC: 305 MG/DL (ref 70–99)
GLUCOSE BLDC GLUCOMTR-MCNC: 316 MG/DL (ref 70–99)
GLUCOSE BLDC GLUCOMTR-MCNC: 363 MG/DL (ref 70–99)
GLUCOSE SERPL-MCNC: 255 MG/DL (ref 70–99)
HCT VFR BLD AUTO: 23.8 % (ref 40–53)
HGB BLD-MCNC: 8.1 G/DL (ref 13.3–17.7)
INR PPP: 2.07 (ref 0.85–1.15)
MCH RBC QN AUTO: 32.7 PG (ref 26.5–33)
MCHC RBC AUTO-ENTMCNC: 34 G/DL (ref 31.5–36.5)
MCV RBC AUTO: 96 FL (ref 78–100)
PLATELET # BLD AUTO: 154 10E3/UL (ref 150–450)
POTASSIUM SERPL-SCNC: 4.7 MMOL/L (ref 3.4–5.3)
PROT SERPL-MCNC: 5.7 G/DL (ref 6.4–8.3)
RBC # BLD AUTO: 2.48 10E6/UL (ref 4.4–5.9)
SODIUM SERPL-SCNC: 128 MMOL/L (ref 136–145)
WBC # BLD AUTO: 10.4 10E3/UL (ref 4–11)

## 2023-01-29 PROCEDURE — 85610 PROTHROMBIN TIME: CPT

## 2023-01-29 PROCEDURE — 82248 BILIRUBIN DIRECT: CPT

## 2023-01-29 PROCEDURE — 250N000013 HC RX MED GY IP 250 OP 250 PS 637

## 2023-01-29 PROCEDURE — 85384 FIBRINOGEN ACTIVITY: CPT

## 2023-01-29 PROCEDURE — 250N000011 HC RX IP 250 OP 636: Performed by: INTERNAL MEDICINE

## 2023-01-29 PROCEDURE — 85027 COMPLETE CBC AUTOMATED: CPT

## 2023-01-29 PROCEDURE — 36415 COLL VENOUS BLD VENIPUNCTURE: CPT

## 2023-01-29 PROCEDURE — 80053 COMPREHEN METABOLIC PANEL: CPT

## 2023-01-29 PROCEDURE — 250N000013 HC RX MED GY IP 250 OP 250 PS 637: Performed by: INTERNAL MEDICINE

## 2023-01-29 RX ORDER — BUMETANIDE 0.5 MG/1
0.5 TABLET ORAL ONCE
Status: COMPLETED | OUTPATIENT
Start: 2023-01-29 | End: 2023-01-29

## 2023-01-29 RX ORDER — LACTULOSE 10 G/10G
20 SOLUTION ORAL 3 TIMES DAILY
Qty: 90 PACKET | Refills: 3 | Status: ON HOLD | OUTPATIENT
Start: 2023-01-29 | End: 2023-03-17

## 2023-01-29 RX ORDER — CIPROFLOXACIN 500 MG/1
500 TABLET, FILM COATED ORAL 2 TIMES DAILY
Qty: 6 TABLET | Refills: 0 | Status: SHIPPED | OUTPATIENT
Start: 2023-01-29 | End: 2023-02-01

## 2023-01-29 RX ORDER — BUMETANIDE 1 MG/1
1 TABLET ORAL DAILY
Status: DISCONTINUED | OUTPATIENT
Start: 2023-01-30 | End: 2023-01-29 | Stop reason: HOSPADM

## 2023-01-29 RX ADMIN — LACTULOSE 20 G: 10 POWDER, FOR SOLUTION ORAL at 08:18

## 2023-01-29 RX ADMIN — FOLIC ACID 1 MG: 1 TABLET ORAL at 08:17

## 2023-01-29 RX ADMIN — BUMETANIDE 0.5 MG: 0.5 TABLET ORAL at 08:18

## 2023-01-29 RX ADMIN — BUMETANIDE 0.5 MG: 0.5 TABLET ORAL at 12:01

## 2023-01-29 RX ADMIN — LACTULOSE 20 G: 10 POWDER, FOR SOLUTION ORAL at 13:34

## 2023-01-29 RX ADMIN — THERA TABS 1 TABLET: TAB at 08:17

## 2023-01-29 RX ADMIN — PANTOPRAZOLE SODIUM 40 MG: 40 TABLET, DELAYED RELEASE ORAL at 08:17

## 2023-01-29 RX ADMIN — CEFTRIAXONE SODIUM 1 G: 1 INJECTION, POWDER, FOR SOLUTION INTRAMUSCULAR; INTRAVENOUS at 13:34

## 2023-01-29 RX ADMIN — RIFAXIMIN 550 MG: 550 TABLET ORAL at 08:17

## 2023-01-29 ASSESSMENT — ACTIVITIES OF DAILY LIVING (ADL)
ADLS_ACUITY_SCORE: 35
ADLS_ACUITY_SCORE: 37
ADLS_ACUITY_SCORE: 35
ADLS_ACUITY_SCORE: 35
ADLS_ACUITY_SCORE: 37
ADLS_ACUITY_SCORE: 35
ADLS_ACUITY_SCORE: 37
ADLS_ACUITY_SCORE: 35

## 2023-01-29 NOTE — PLAN OF CARE
"    Neuro: Alert and oriented x 4, pleasant and cooperative with cares, able to make needs known, and uses call light appropriately. Mom is at bedside, very involved with patient's cares. No evidence of bleeding this shift.  Cardiac: SR, Afebrile, and vitally stable.   Respiratory: Lungs sound clear, denies dyspnea, sat in mid 90s on RA  GI/: Active bowel sounds in all quads, incontinent of bowel x 2, uses urinal at bed side, and voiding spontaneously.   Diet/appetite: Tolerating regular diet. Denies nausea.  Activity: Up with assist x 1, ambulates in the room to bathroom x 2,appears steady   Pain: Denies   Skin:  Jaundice, otherwise no new deficits noted.  Lines: R Piv x 2, L Piv x 1, SL'D  Drains: No Drains   Plan: Continue with current POC, notify Team of any change in status    Problem: Plan of Care - These are the overarching goals to be used throughout the patient stay.    Goal: Patient-Specific Goal (Individualized)  Description: You can add care plan individualizations to a care plan. Examples of Individualization might be:  \"Parent requests to be called daily at 9am for status\", \"I have a hard time hearing out of my right ear\", or \"Do not touch me to wake me up as it startles me\".  Outcome: Progressing  Flowsheets (Taken 1/28/2023 1808)  Anxieties, Fears or Concerns: none  Goal: Absence of Hospital-Acquired Illness or Injury  Outcome: Progressing  Intervention: Identify and Manage Fall Risk  Recent Flowsheet Documentation  Taken 1/28/2023 1600 by Kacey Neal RN  Safety Promotion/Fall Prevention:    activity supervised    assistive device/personal items within reach    clutter free environment maintained    fall prevention program maintained    increased rounding and observation  Taken 1/28/2023 1200 by Kacey Neal RN  Safety Promotion/Fall Prevention:    activity supervised    assistive device/personal items within reach    clutter free environment maintained    fall prevention program " maintained    increased rounding and observation  Taken 1/28/2023 0800 by Kacey Neal RN  Safety Promotion/Fall Prevention:    activity supervised    assistive device/personal items within reach    clutter free environment maintained    fall prevention program maintained    increased rounding and observation  Intervention: Prevent Skin Injury  Recent Flowsheet Documentation  Taken 1/28/2023 1600 by Kacey Neal RN  Body Position: position changed independently  Taken 1/28/2023 1200 by Kacey Neal RN  Body Position: position changed independently  Taken 1/28/2023 0800 by Kacey Neal RN  Body Position: position changed independently  Intervention: Prevent and Manage VTE (Venous Thromboembolism) Risk  Recent Flowsheet Documentation  Taken 1/28/2023 1600 by Kacey Neal RN  VTE Prevention/Management: SCDs (sequential compression devices) off  Taken 1/28/2023 1200 by Kacey Neal RN  VTE Prevention/Management: SCDs (sequential compression devices) off  Taken 1/28/2023 0800 by Kacey Neal RN  VTE Prevention/Management: SCDs (sequential compression devices) off  Goal: Readiness for Transition of Care  Outcome: Progressing  Flowsheets (Taken 1/28/2023 1808)  Anticipated Changes Related to Illness: none  Intervention: Mutually Develop Transition Plan  Recent Flowsheet Documentation  Taken 1/28/2023 1808 by Kacey Neal RN  Anticipated Changes Related to Illness: none     Problem: Risk for Delirium  Goal: Optimal Coping  Outcome: Progressing  Goal: Improved Behavioral Control  Outcome: Progressing  Intervention: Minimize Safety Risk  Recent Flowsheet Documentation  Taken 1/28/2023 1600 by Kacey Neal RN  Enhanced Safety Measures: family to remain at bedside  Trust Relationship/Rapport:    care explained    questions encouraged  Taken 1/28/2023 1200 by Kacey Neal RN  Enhanced Safety Measures: family to remain at bedside  Trust  Relationship/Rapport:    care explained    questions encouraged  Taken 1/28/2023 0800 by Kacey Neal RN  Enhanced Safety Measures: family to remain at bedside  Trust Relationship/Rapport:    care explained    questions encouraged  Goal: Improved Attention and Thought Clarity  Outcome: Progressing  Goal: Improved Sleep  Outcome: Progressing     Problem: Diabetes Comorbidity  Goal: Blood Glucose Level Within Targeted Range  Outcome: Progressing   Goal Outcome Evaluation:

## 2023-01-29 NOTE — PROVIDER NOTIFICATION
"2142: \"pt . 8pm Lantus 15u was given. No carb coverage insulin ordered. Pt eating a lot. Insulin gtt d/c'd today. Restart insulin gtt or bolus novolog?\"    New order for one time dose 5u Novolog    2307: \"11pm BG was 423. Giving scheduled bedtime sliding scale Novolog 5u now. Restart insulin gtt or another bolus novolog?\"    No new orders or callback at this time    2350: \"BG recheck was 305\"    No new orders at this time. Provider called writer back.      "

## 2023-01-29 NOTE — PLAN OF CARE
Neuro: A&Ox4. Can be forgetful at times but easily re-orientated. Able to make needs known.  Cardiac: No tele orders. Afebrile overnight. MAP's sustained >65.      Respiratory: Sating >93% on RA.  GI/: Adequate urine output - incontinent of stool & urine at times. BM x3 this shift.  Diet/appetite: Tolerating consistent carb/diabetic diet. No carb coverage insulin ordered at this time. Eating well.  Activity: Stand by assist.  Pain: At acceptable level on current regimen. Denied pain throughout shift.  Skin: No new deficits noted. Blanchable redness on sacrum.  LDA's: 2 R PIV - upper infusing, lower SL. 1 L PIV - SL.     See provider notifications for significant events overnight.     Plan: Continue Q4H BG checks. Continue to encourage oral intake as tolerated. Continue with POC. Notify primary team with changes.    Mark Moctezuma RN on 1/29/2023 at 6:56 AM

## 2023-01-29 NOTE — PROGRESS NOTES
DISCHARGE               Discharged to: Home  Via: private transportation  Accompanied by: Family  Discharge Instructions: diet, activity, medications, follow up appointments, when to call the MD, aftercare instructions.  Prescriptions: To be filled by discharge pharmacy and [facility] in [place]; medication list reviewed & sent with pt  Follow Up Appointments: arranged; information given  Belongings: All sent with pt  IV: d/c'd  Telemetry: d/c'd  Pt exhibits understanding of above discharge instructions; all questions answered.    Discharge Paperwork: Signed, copied, and sent home with patient.

## 2023-01-29 NOTE — PROGRESS NOTES
Gastroenterology Follow up Note         Assessment and Plan:   Dillon Salter is a 27 year old male Asperger's, DM2, depression, alcohol related cirrhosis c/b hepatic encephalopathy, history of esophageal varices bleeding (5/2022), ascites, who was admitted at [facility] 1/21/23 with hepatic encephalopathy, then developed hematochezia underwent EGD with non-bleeding EV s/p banded, then continue to have hematochezia with CTA showing bleeding from rectum, transferred to [facility] 1/26 for further management of rectal bleeding.    #Hematochezia   #Post-hemorrhagic anemia    Patient now s/p EGD/Colonoscopy/rectal EUS that revealed rectal varices that were successfully treated with glue embolization. EGD with post-banding varices, no other abnormalities.    Patient currently doing well, no signs of GI bleeding    # Decompensated cirrhosis with hepatic encephalopathy    MELD-Na 27  Etiology: Alcohol  EV: esophageal varices s/p banded 1/23/22  Ascites: home meds on bumex 1 mg daily and spironolactone 100 mg daily, not adequate fluid to tap, no prior SBP  Liver transplant candidacy: Last drink 4/2022, follows with DR. Wood.  Ongoing liver transplant eval outpatient :  - Chemical dependency assessment and programming, ongoing  - CT angiogram, scheduled 4/2023  - PFTs given history of tobacco use  - Endocrine referral given poor diabetes control, scheduled 4/2023  - Mental health referral, ongoing         Recommendations:     - No further GI procedures at this time.  - Continue IV Ceftriaxone for total 5 days for SBP prophylaxis.  - Okay to stop Octreotide.  - Can transition to PO PPIs once daily.  - Continue lactulose to titrate for 2-3 BMs daily  - Can resume PTA diuretics at half dose -> Bumex 0.5 mg (or Lasix 20mg) and Spironolactone 50mg once daily.     It has been a pleasure to participate in the care of this patient.  Patient discussed with GI staff, Dr. Nelson.  Please do not hesitate to contact the GI service with any  questions or concerns.     Sarthak Carlisle MD  Gastroenterology Fellow  Pager [phone]         Subjective/Objective:   - No acute events overnight         Medications:     Current Facility-Administered Medications   Medication     bumetanide (BUMEX) tablet 0.5 mg     cefTRIAXone (ROCEPHIN) 1 g vial to attach to  mL bag for ADULTS or NS 50 mL bag for PEDS     chlorhexidine (PERIDEX) 0.12 % solution 15 mL     glucose gel 15-30 g    Or     dextrose 50 % injection 25-50 mL    Or     glucagon injection 1 mg     glucose gel 15-30 g    Or     dextrose 50 % injection 25-50 mL    Or     glucagon injection 1 mg     FLUoxetine (PROzac) capsule 60 mg     folic acid (FOLVITE) tablet 1 mg     gabapentin (NEURONTIN) capsule 100 mg     insulin aspart (NovoLOG) injection (RAPID ACTING)     insulin aspart (NovoLOG) injection (RAPID ACTING)     [START ON 1/29/2023] insulin glargine (LANTUS PEN) injection 10 Units     insulin glargine (LANTUS PEN) injection 15 Units     lactulose (CEPHULAC) Packet 20 g     multivitamin, therapeutic (THERA-VIT) tablet 1 tablet     naloxone (NARCAN) injection 0.2 mg     naloxone (NARCAN) injection 0.2 mg     naloxone (NARCAN) injection 0.4 mg     naloxone (NARCAN) injection 0.4 mg     NONFORMULARY     ondansetron (ZOFRAN ODT) ODT tab 4 mg    Or     ondansetron (ZOFRAN) injection 4 mg     [START ON 1/29/2023] pantoprazole (PROTONIX) EC tablet 40 mg     rifaximin (XIFAXAN) tablet 550 mg     spironolactone (ALDACTONE) tablet 50 mg            Physical Exam:   VS:  /67 (BP Location: Left arm, Cuff Size: Adult Small)   Pulse 86   Temp 97.8  F (36.6  C) (Oral)   Resp 16   Ht 1.524 m (5')   Wt 74.1 kg (163 lb 5.8 oz)   SpO2 96%   BMI 31.90 kg/m      Wt:   Wt Readings from Last 2 Encounters:   01/28/23 74.1 kg (163 lb 5.8 oz)   01/22/23 66.3 kg (146 lb 3.2 oz)      Constitutional: cooperative, pleasant, no acute distress  Eyes: + scleral icterus  HEENT: Normal oropharynx without ulcers or  exudate, mucus membranes moist  CV: RRR, no m/r/g  Respiratory: CTAB  Abd:  Nondistended, +bs, no hepatosplenomegaly, nontender, no rebound or guarding   Skin: warm, perfused, + jaundice  Neuro: AAO x 3, No asterixis         Laboratory:   BMP  Recent Labs   Lab 01/28/23 1957 01/28/23  1724 01/28/23  1302 01/28/23  1204 01/28/23  0602 01/28/23  0541 01/27/23  0802 01/27/23  0347 01/26/23  1155 01/26/23  0913 01/26/23  0411 01/26/23  0404   NA  --   --   --   --   --  129*  --  131*  --  132*  --  129*   POTASSIUM  --   --   --   --   --  4.9  --  3.9  --  3.7  --  4.0   CHLORIDE  --   --   --   --   --  97*  --  98  --  98  --  95*   SARTHAK  --   --   --   --   --  8.0*  --  8.1*  --  8.4*  --  8.1*   CO2  --   --   --   --   --  24  --  25  --  25  --  24   BUN  --   --   --   --   --  19.7  --  11.7  --  19.8  --  22.2*   CR  --   --   --   --   --  0.89  --  0.65*  --  0.87  --  0.95   * 469* 355* 338*   < > 266*   < > 141*   < > 115*   < > 183*    < > = values in this interval not displayed.     CBC  Recent Labs   Lab 01/28/23  0541 01/27/23 2027 01/27/23  1208 01/27/23  0347   WBC 9.9 11.1* 13.8* 15.3*   RBC 2.36* 2.56* 2.65* 2.44*   HGB 7.5* 8.2* 8.5* 7.9*   HCT 22.7* 23.8* 25.1* 22.3*   MCV 96 93 95 91   MCH 31.8 32.0 32.1 32.4   MCHC 33.0 34.5 33.9 35.4   RDW 20.6* 21.2* 21.6* 21.4*   * 104* 117* 106*     INR  Recent Labs   Lab 01/26/23  0404 01/25/23  0831 01/24/23  1047   INR 1.87* 1.83* 2.00*     LFTs  Recent Labs   Lab 01/28/23  0541 01/27/23  0347 01/26/23  0913 01/26/23  0404   ALKPHOS 87 82 84 70   AST 57* 63* 63* 66*   ALT 25 26 26 26   BILITOTAL 8.3* 10.1* 10.3* 8.9*   PROTTOTAL 5.2* 5.1* 5.7* 5.1*   ALBUMIN 2.7* 2.8* 3.0* 2.8*      PANCNo lab results found in last 7 days.         Imaging/Procedures/Studies:   EGD 01/27/2023      Impression:            - Images captured under Lower EUS/colonoscopy report                          - Very recently banded esophageal varices without                           stigmata of acitve or recent bleeding. Band still in                          place and one ulcerated area.                          - Normal stomach.                          - Normal examined duodenum.                          - No specimens collected.     EUS 01/27/2023    Impression:            - No old blood or active bleeding seen throughout.                          Normal ileum. Diffusely edematous colon consistent                          with portal colopathy. Rectal varices noted. No                          stigmata seen.                          - Varices measured 4 mm in maximal diameter. No                          hemorrhoids. Inflow tract of rectal varix targeted and                          punctured with a 19g Echotip needle. 1 ml of                          histoacryl:lipiodol mixture used to embolize. Second                          site targeted measuring only 3 mm and punctured                          followed by embolization with 1 ml of                          histoacryl:lipiodol mixture although most ended up                          within the lumen of the rectum but nevertheless                          adequately embolized varix. Subsequent endoscopic exam                          showed no active bleeding.                          - Trace pelvic ascites noted on EUS.                          - Unclear if rectal varices were truly the source of                          recent hematochezia given no high risk stigmata and                          really quite small size/burden of rectal varices.                          Alternative explanation would be a post-banding ulcer                          bleed that self-resolved. CTA showed enhancement                          (filling) of rectal varices but not extravasation.                          - MODIFER 22 given complexity of procedure and                          advanced techniques used     Attestation:  This patient  has been seen and evaluated by me, Viola Nelson.  Discussed with the house staff team or resident(s) and agree with the findings and plan in this note.

## 2023-01-29 NOTE — PROVIDER NOTIFICATION
Time of notification: 5:40 PM  Provider notified: Uri Callejas MD   Patient status: Blood sugar 469   Orders received: 5 extra units of Novalog given. Lantus increased to 15 units. Will continue with plan of care.

## 2023-01-30 ENCOUNTER — PATIENT OUTREACH (OUTPATIENT)
Dept: CARE COORDINATION | Facility: CLINIC | Age: 28
End: 2023-01-30

## 2023-01-30 LAB
BACTERIA BLD CULT: NO GROWTH
BACTERIA BLD CULT: NO GROWTH

## 2023-01-30 NOTE — DISCHARGE SUMMARY
[facility]  Discharge Summary - Medicine & Pediatrics       Date of Admission:  1/21/2023  Date of Discharge:  1/29/2023  Discharging Provider: Parveen Carrasco MD, MS  Discharge Service: Hospitalist Service    Discharge Diagnoses   Decompensated alcoholic cirrhosis c/b anemia, thrombocytopenia, Esophageal varices (s/p banding), and hepatic encephalopathy   Hyponatremia   Hematochezia  Leukocytosis    T2DM c/b hyperglycemia   Systolic Murmur  Anxiety/depression     Follow-ups Needed After Discharge   PCP  GI-Hepatology  GI-Luminal     Discharge Disposition   Discharged to home  Condition at discharge: Stable    Hospital Course   Dillon Salter is a 27 year old male admitted on 1/26/2023. He has a PMH of Asperger's, alcoholic cirrhosis (diagnosed 4/2022, MELD 27), esophageal varices (banded 5/2022), and hepatic encephalopathy currently undergoing liver transplant evaluation at [facility] who was transferred from [facility] on 1/26/23 for lower GI bleed 2/2 rectal varices vs hemorrhoids requiring advanced GI intervention, that resulted in variceal banding. He later underwent another EGD and colonoscopy on 01/27 with findings of healing esophageal varices from EGD and banding at [facility], and very small rectal varices without high risk stigmata. Hospitalization was extended due to hyperglycemia. Otherwise he was discharged stable and with follow up referrals as per above.      ----Other problems addressed while hospitalized----  T2DM c/b hyperglycemia   Last A1c 5.4 in 7/2022. He was hypoglycemic on admission. He had not eaten for several days d/t abdominal discomfort and later NPO for procedures. He resumed diet on 01/27 with hyperglycemia back. Discharge on home insulin regimen, which has been successful.     Systolic Murmur  Noted on exam. Likely 2/2 acute bleed. Echo on 7/2022 w/o valvular findings. HDS. Echo 01/26 without valvular abnormalities.      Leukocytosis   Patient had upward  trending WBC since 1/21 to 18.0 on 01/25, now resovled on 01/28. Outside hospital started Zosyn on 1/25 given leukocytosis. NGTD on blood cultures. CTA 1/25 showed focal ground glass opacity in left lower lobe which could possibly reflect infectious process. However, lower suspicion given Spo2 in mid-high 90s on RA, no other evidence of respiratory infection. Received Zosyn (1/25-1/26), that was later switched to CTX for SBP prophylaxis and sent home on a 2 day course of PO cipro.     Consultations This Hospital Stay   CARE MANAGEMENT / SOCIAL WORK IP CONSULT  OCCUPATIONAL THERAPY ADULT IP CONSULT  PHYSICAL THERAPY ADULT IP CONSULT  GASTROENTEROLOGY IP CONSULT  INTERVENTIONAL RADIOLOGY ADULT/PEDS IP CONSULT  GASTROENTEROLOGY IP CONSULT    Code Status   Prior       The patient was discussed with Dr. Duggy German Velez Reyes, MD, PhD  [address] [address]  Phone: [phone]  Fax: [phone]  ______________________________________________________________________    Physical Exam   Vital Signs: Temp: 99.1  F (37.3  C) Temp src: Oral BP: 123/65 Pulse: 84   Resp: 15 SpO2: 99 % O2 Device: None (Room air) Oxygen Delivery: 1 LPM  Weight: 163 lbs 5.77 oz     GEN: Awake, alert, no acute distress   HEENT: Sclera icteric   CV: RRR, systolic murmur, extremities perfused, no lower extremity edema noted   PULM: breathing comfortably on room air, CTAB   GI: +BS, distended, normal bowel sounds, soft, non-tender, no rebound tenderness  SKIN: spider angiomata on right chest   Neuro: AOX3, moving all extremities appropriately      Primary Care Physician   Gary Rodrigues    Discharge Orders   No discharge procedures on file.    Significant Results and Procedures   Results for orders placed or performed during the hospital encounter of 01/21/23   Head CT w/o contrast    Narrative    EXAM: CT HEAD WITHOUT CONTRAST  LOCATION: [facility]  [facility]  DATE/TIME: 01/21/2023, 8:33 AM    INDICATION: Confusion.  COMPARISON: CT brain 08/18/2022.  TECHNIQUE: Routine CT Head without IV contrast. Multiplanar reformats. Dose reduction techniques were used.    FINDINGS:  INTRACRANIAL CONTENTS: Exam is degraded by motion artifact. No finding for intracranial hemorrhage, mass, or acute infarct. The ventricles are normal in size. Normal gray-white matter differentiation. No mass effect or midline shift.    Cerebellar tonsils are normally positioned. Sella is unremarkable for technique. Corpus callosum is normally formed.    VISUALIZED ORBITS/SINUSES/MASTOIDS: Orbits are unremarkable. Visualized paranasal sinuses, middle ear cavities, and mastoid air cells are clear.    BONES/SOFT TISSUES: Calvarium is intact, without fracture or suspicious lytic or blastic foci.      Impression    IMPRESSION:  1.  No finding for intracranial hemorrhage, mass, or acute infarct.       US Abdomen Limited    Narrative    EXAM: US ABDOMEN LIMITED  LOCATION: [facility]  DATE/TIME: 1/24/2023 12:37 PM    INDICATION: History of cirrhosis and hepatic encephalopathy. Presents with altered mental status. Evaluating ruling out SBP, diagnostic tap.  COMPARISON: 12/20/2022  TECHNIQUE: Limited abdominal ultrasound.    FINDINGS: Imaging performed over all 4 quadrants for possible paracentesis. No ascites seen. No paracentesis performed.   XR Chest Port 1 View    Narrative    EXAM: XR CHEST PORT 1 VIEW  LOCATION: [facility]  DATE/TIME: 1/25/2023 10:33 AM    INDICATION: white count, fever  COMPARISON: 12/22/2022      Impression    IMPRESSION: Lungs are clear. No pleural effusion. Heart size and pulmonary vascularity within normal limits.   CTA Abdomen Pelvis with Contrast     Value    Radiologist flags (Urgent)     Dilated perirectal vessels with adjacent mural thickening of the rectum and a focal area of increased enhancement of  the lumen of the rectum concerning for bleeding hemorrhoids, given the mural thickening which is asymmetric a rectal mass and/or polyp   may also be present in this region.      Narrative    EXAM: CTA ABDOMEN PELVIS WITH CONTRAST  LOCATION: [facility]  DATE/TIME: 1/25/2023 8:25 PM    INDICATION: Melena, cirrhosis, active oral bleeding with no other source on EGD. CTA abdom to evaluate other cause source  COMPARISON: Prior study dated 4/7/2022  TECHNIQUE: CT angiogram abdomen pelvis during arterial phase of injection of IV contrast. 2D and 3D MIP reconstructions were performed by the CT technologist. Dose reduction techniques were used.  CONTRAST: ISOVUE 370 90mL    FINDINGS:  ANGIOGRAM ABDOMEN/PELVIS: The abdominal aorta is normal in size and caliber. The mesenteric arteries and celiac artery are well-opacified and normal in size and caliber. The RAY arises normally and is normal in size and caliber. Dilated perirectal   vessels are seen along the leftward aspect of the rectum (image 192, series 5, adjacent to the focal area of mural thickening, additionally there is an area of increased enhancement within the lumen of the rectum. (Image 202, series 5).     LOWER CHEST: Multifocal groundglass opacities are seen in the left lower lobe    HEPATOBILIARY: Layering sludge is seen within the gallbladder. The liver is cirrhotic in appearance, similar to prior exam. There is mildly enlarged measuring 19 cm in craniocaudal dimension, this is decreased in size from prior exam.    PANCREAS: Normal.    SPLEEN: Spleen is enlarged. Similar prior exam.    ADRENAL GLANDS: No significant nodules.    KIDNEYS/BLADDER: No significant mass, stone, or hydronephrosis.    BOWEL: Mild thickening of the distal esophagus is noted, improved from 4/7/2022. The stomach and duodenum are unremarkable. There are fluid-filled loops of small bowel are normal in size and caliber. There is mural thickening of the rectum,  (image 205,   series 5) is somewhat asymmetric, more pronounced along its leftward aspect.    LYMPH NODES: No lymphadenopathy.    PELVIC ORGANS: No pelvic masses.    MUSCULOSKELETAL: Unremarkable.      Impression    IMPRESSION:  1.  Dilated perirectal vessels with adjacent mural thickening of the rectum and a focal area of increased enhancement of the lumen of the rectum concerning for bleeding hemorrhoids, given the mural thickening which is asymmetric a rectal mass and/or   polyp may also be present in this region.   2.  Mild thickening of the distal esophagus, may reflect changes of reflux/GERD, improved when compared with 4/7/2022.  3.  Focal groundglass opacity seen in the left lower lobe, which may reflect multifocal infectious process  4.  Hepatosplenomegaly, slightly improved from prior exam with interval decrease in size of the liver. Cirrhotic changes of liver again noted, similar to prior study.  5.  Layering biliary sludge within the gallbladder      [Urgent Result: Dilated perirectal vessels with adjacent mural thickening of the rectum and a focal area of increased enhancement of the lumen of the rectum concerning for bleeding hemorrhoids, given the mural thickening which is asymmetric a rectal mass   and/or polyp may also be present in this region. ]    Finding was identified on 1/25/2023 10:00 PM.     DR. Posada  was contacted by me on 1/26/2023 12:26 AM and verbalized understanding of the critical result.           Discharge Medications   Discharge Medication List as of 1/26/2023  3:11 AM      CONTINUE these medications which have NOT CHANGED    Details   acetaminophen (TYLENOL) 500 MG tablet Take 500 mg by mouth daily as needed for pain, Historical      bumetanide (BUMEX) 1 MG tablet Take 1 tablet (1 mg) by mouth daily, Disp-60 tablet, R-5, E-Prescribe      FLUoxetine (PROZAC) 20 MG capsule Take 60 mg by mouth At Bedtime, Historical      folic acid (FOLVITE) 1 MG tablet Take 1 tablet (1 mg) by  mouth daily, Disp-90 tablet, R-1, E-Prescribe      gabapentin (NEURONTIN) 100 MG capsule Take 100 mg by mouth daily as needed, Historical      ibuprofen (ADVIL/MOTRIN) 200 MG tablet Take 400 mg by mouth daily as needed for pain, Historical      insulin aspart (NOVOLOG FLEXPEN) 100 UNIT/ML pen 1 unit per 10 grams of carb, plus additional units based on following scale   For Pre-Meal  - 164 give 1 unit. For Pre-Meal  - 189 give 2 units. For Pre-Meal  - 214 give 3 units. For Pre-Meal  - 239 give 4 units. For Pre-Meal BG 2 40 - 264 give 5 units. For Pre-Meal  - 289 give 6 units. For Pre-Meal  - 314 give 7 units. For Pre-Meal  - 339 give 8 units. For Pre-Meal  - 364 give 9 units.  For Pre-Meal BG greater than or equal to 365 give 10 units, Disp-15 mL , R-3, No Print Out      insulin glargine (LANTUS PEN) 100 UNIT/ML pen Inject 20 Units Subcutaneous 2 times daily, Disp-15 mL, R-0, E-PrescribeIf Lantus is not covered by insurance, may substitute Basaglar or Semglee or other insulin glargine product per insurance preference at same dose and frequency.        multivitamin, therapeutic (THERA-VIT) TABS tablet Take 1 tablet by mouth in the morning., OTC      pantoprazole (PROTONIX) 40 MG EC tablet Take 1 tablet (40 mg) by mouth daily, Disp-30 tablet, R-3, E-Prescribe      rifaximin (XIFAXAN) 550 MG TABS tablet Take 1 tablet (550 mg) by mouth 2 times daily, Disp-60 tablet, R-0, E-Prescribe      spironolactone (ALDACTONE) 100 MG tablet Take 100 mg by mouth At Bedtime, Disp-60 tablet, R-5, Historical      thiamine (B-1) 100 MG tablet Take 1 tablet (100 mg) by mouth in the morning., Disp-30 tablet, R-0, E-Prescribe      lactulose (CHRONULAC) 10 GM/15ML solution Take 15-30 mLs (10-20 g) by mouth 2 times daily, Disp-1800 mL, R-11, E-Prescribe           Allergies   No Known Allergies

## 2023-01-30 NOTE — PROGRESS NOTES
[facility]    Background: Transitional Care Management program auto-identified and prompting a chart review by [facility] team.    Assessment: Upon chart review, [facility] Team member will Enroll this episode of Transitional Care Management program due to reason below:    Upon chart review, patient is followed by Solid Organ Transplant team who follow their patients closely. [facility] will not conduct outreach to avoid duplication of outreach to patient.     Plan: Transitional Care Management episode enrolled per reason above.

## 2023-02-07 DIAGNOSIS — K70.31 ALCOHOLIC CIRRHOSIS OF LIVER WITH ASCITES (H): Primary | ICD-10-CM

## 2023-02-09 ENCOUNTER — HOSPITAL ENCOUNTER (OUTPATIENT)
Dept: BEHAVIORAL HEALTH | Facility: CLINIC | Age: 28
Discharge: HOME | End: 2023-02-09
Attending: FAMILY MEDICINE
Payer: COMMERCIAL

## 2023-02-09 PROCEDURE — H2035 A/D TX PROGRAM, PER HOUR: HCPCS | Mod: GT,95

## 2023-02-11 ENCOUNTER — HOSPITAL ENCOUNTER (INPATIENT)
Facility: CLINIC | Age: 28
LOS: 36 days | Discharge: HOME | DRG: 005 | End: 2023-03-19
Attending: STUDENT IN AN ORGANIZED HEALTH CARE EDUCATION/TRAINING PROGRAM | Admitting: HOSPITALIST
Payer: COMMERCIAL

## 2023-02-11 ENCOUNTER — HOSPITAL ENCOUNTER (EMERGENCY)
Facility: CLINIC | Age: 28
Discharge: ANOTHER HOSPITAL | End: 2023-02-11
Attending: EMERGENCY MEDICINE | Admitting: EMERGENCY MEDICINE
Payer: COMMERCIAL

## 2023-02-11 VITALS
HEIGHT: 65 IN | HEART RATE: 98 BPM | RESPIRATION RATE: 23 BRPM | TEMPERATURE: 97.9 F | SYSTOLIC BLOOD PRESSURE: 148 MMHG | OXYGEN SATURATION: 100 % | DIASTOLIC BLOOD PRESSURE: 87 MMHG | BODY MASS INDEX: 25.83 KG/M2 | WEIGHT: 155 LBS

## 2023-02-11 DIAGNOSIS — E11.69 DIABETES MELLITUS TYPE 2 IN OBESE: Chronic | ICD-10-CM

## 2023-02-11 DIAGNOSIS — E80.6 HYPERBILIRUBINEMIA: ICD-10-CM

## 2023-02-11 DIAGNOSIS — D84.9 IMMUNOSUPPRESSED STATUS (H): ICD-10-CM

## 2023-02-11 DIAGNOSIS — K92.0 HEMATEMESIS, UNSPECIFIED WHETHER NAUSEA PRESENT: ICD-10-CM

## 2023-02-11 DIAGNOSIS — E66.9 DIABETES MELLITUS TYPE 2 IN OBESE: Chronic | ICD-10-CM

## 2023-02-11 DIAGNOSIS — E87.5 HYPERKALEMIA: ICD-10-CM

## 2023-02-11 DIAGNOSIS — N17.9 ACUTE RENAL FAILURE, UNSPECIFIED ACUTE RENAL FAILURE TYPE (H): ICD-10-CM

## 2023-02-11 DIAGNOSIS — E72.20 HYPERAMMONEMIA (H): ICD-10-CM

## 2023-02-11 DIAGNOSIS — G89.18 ACUTE POST-OPERATIVE PAIN: ICD-10-CM

## 2023-02-11 DIAGNOSIS — I85.00 ESOPHAGEAL VARICES WITHOUT BLEEDING, UNSPECIFIED ESOPHAGEAL VARICES TYPE (H): ICD-10-CM

## 2023-02-11 DIAGNOSIS — K70.31 ALCOHOLIC CIRRHOSIS OF LIVER WITH ASCITES (H): Chronic | ICD-10-CM

## 2023-02-11 DIAGNOSIS — F32.A ANXIETY AND DEPRESSION: ICD-10-CM

## 2023-02-11 DIAGNOSIS — Z94.4 LIVER REPLACED BY TRANSPLANT (H): Primary | ICD-10-CM

## 2023-02-11 DIAGNOSIS — K92.2 UGIB (UPPER GASTROINTESTINAL BLEED): ICD-10-CM

## 2023-02-11 DIAGNOSIS — D68.9 COAGULOPATHY (H): ICD-10-CM

## 2023-02-11 DIAGNOSIS — D64.9 ANEMIA, UNSPECIFIED TYPE: ICD-10-CM

## 2023-02-11 DIAGNOSIS — K70.31 ALCOHOLIC CIRRHOSIS OF LIVER WITH ASCITES (H): ICD-10-CM

## 2023-02-11 DIAGNOSIS — K76.82 HEPATIC ENCEPHALOPATHY (H): ICD-10-CM

## 2023-02-11 DIAGNOSIS — F41.9 ANXIETY AND DEPRESSION: ICD-10-CM

## 2023-02-11 DIAGNOSIS — D69.6 THROMBOCYTOPENIA (H): ICD-10-CM

## 2023-02-11 DIAGNOSIS — I15.0 RENOVASCULAR HYPERTENSION: ICD-10-CM

## 2023-02-11 LAB
ABO/RH(D): NORMAL
ALBUMIN SERPL-MCNC: 3.6 G/DL (ref 3.5–5)
ALP SERPL-CCNC: 157 U/L (ref 45–120)
ALT SERPL W P-5'-P-CCNC: 54 U/L (ref 0–45)
AMMONIA PLAS-SCNC: 112 UMOL/L (ref 11–35)
ANION GAP SERPL CALCULATED.3IONS-SCNC: 10 MMOL/L (ref 5–18)
ANTIBODY SCREEN: NEGATIVE
APTT PPP: 39 SECONDS (ref 22–38)
AST SERPL W P-5'-P-CCNC: 71 U/L (ref 0–40)
BASOPHILS # BLD AUTO: 0.1 10E3/UL (ref 0–0.2)
BASOPHILS NFR BLD AUTO: 1 %
BILIRUB SERPL-MCNC: 10.7 MG/DL (ref 0–1)
BUN SERPL-MCNC: 33 MG/DL (ref 8–22)
CALCIUM SERPL-MCNC: 9.9 MG/DL (ref 8.5–10.5)
CHLORIDE BLD-SCNC: 96 MMOL/L (ref 98–107)
CO2 SERPL-SCNC: 27 MMOL/L (ref 22–31)
CREAT SERPL-MCNC: 1.18 MG/DL (ref 0.7–1.3)
EOSINOPHIL # BLD AUTO: 0.1 10E3/UL (ref 0–0.7)
EOSINOPHIL NFR BLD AUTO: 1 %
ERYTHROCYTE [DISTWIDTH] IN BLOOD BY AUTOMATED COUNT: 17 % (ref 10–15)
ETHANOL SERPL-MCNC: <10 MG/DL
GFR SERPL CREATININE-BSD FRML MDRD: 86 ML/MIN/1.73M2
GLUCOSE BLD-MCNC: 216 MG/DL (ref 70–125)
GLUCOSE BLDC GLUCOMTR-MCNC: 138 MG/DL (ref 70–99)
HCT VFR BLD AUTO: 27.4 % (ref 40–53)
HGB BLD-MCNC: 8 G/DL (ref 13.3–17.7)
HGB BLD-MCNC: 9.1 G/DL (ref 13.3–17.7)
IMM GRANULOCYTES # BLD: 0.1 10E3/UL
IMM GRANULOCYTES NFR BLD: 1 %
INR PPP: 1.48 (ref 0.85–1.15)
LACTATE SERPL-SCNC: 1.7 MMOL/L (ref 0.7–2)
LIPASE SERPL-CCNC: <9 U/L (ref 0–52)
LYMPHOCYTES # BLD AUTO: 1.3 10E3/UL (ref 0.8–5.3)
LYMPHOCYTES NFR BLD AUTO: 11 %
MCH RBC QN AUTO: 34 PG (ref 26.5–33)
MCHC RBC AUTO-ENTMCNC: 33.2 G/DL (ref 31.5–36.5)
MCV RBC AUTO: 102 FL (ref 78–100)
MONOCYTES # BLD AUTO: 0.3 10E3/UL (ref 0–1.3)
MONOCYTES NFR BLD AUTO: 2 %
NEUTROPHILS # BLD AUTO: 9.3 10E3/UL (ref 1.6–8.3)
NEUTROPHILS NFR BLD AUTO: 84 %
NRBC # BLD AUTO: 0 10E3/UL
NRBC BLD AUTO-RTO: 0 /100
PLATELET # BLD AUTO: 171 10E3/UL (ref 150–450)
POTASSIUM BLD-SCNC: 5.5 MMOL/L (ref 3.5–5)
POTASSIUM SERPL-SCNC: 5.7 MMOL/L (ref 3.4–5.3)
PROT SERPL-MCNC: 8.4 G/DL (ref 6–8)
RBC # BLD AUTO: 2.68 10E6/UL (ref 4.4–5.9)
SODIUM SERPL-SCNC: 133 MMOL/L (ref 136–145)
SPECIMEN EXPIRATION DATE: NORMAL
WBC # BLD AUTO: 11.1 10E3/UL (ref 4–11)

## 2023-02-11 PROCEDURE — 258N000003 HC RX IP 258 OP 636: Performed by: EMERGENCY MEDICINE

## 2023-02-11 PROCEDURE — 85025 COMPLETE CBC W/AUTO DIFF WBC: CPT | Performed by: EMERGENCY MEDICINE

## 2023-02-11 PROCEDURE — 83690 ASSAY OF LIPASE: CPT | Performed by: EMERGENCY MEDICINE

## 2023-02-11 PROCEDURE — 96366 THER/PROPH/DIAG IV INF ADDON: CPT

## 2023-02-11 PROCEDURE — 86923 COMPATIBILITY TEST ELECTRIC: CPT

## 2023-02-11 PROCEDURE — 250N000013 HC RX MED GY IP 250 OP 250 PS 637: Performed by: EMERGENCY MEDICINE

## 2023-02-11 PROCEDURE — 82140 ASSAY OF AMMONIA: CPT | Performed by: EMERGENCY MEDICINE

## 2023-02-11 PROCEDURE — C9113 INJ PANTOPRAZOLE SODIUM, VIA: HCPCS | Performed by: EMERGENCY MEDICINE

## 2023-02-11 PROCEDURE — 85730 THROMBOPLASTIN TIME PARTIAL: CPT | Performed by: EMERGENCY MEDICINE

## 2023-02-11 PROCEDURE — 80321 ALCOHOLS BIOMARKERS 1OR 2: CPT | Performed by: INTERNAL MEDICINE

## 2023-02-11 PROCEDURE — 250N000011 HC RX IP 250 OP 636: Performed by: EMERGENCY MEDICINE

## 2023-02-11 PROCEDURE — 250N000011 HC RX IP 250 OP 636: Performed by: STUDENT IN AN ORGANIZED HEALTH CARE EDUCATION/TRAINING PROGRAM

## 2023-02-11 PROCEDURE — 96376 TX/PRO/DX INJ SAME DRUG ADON: CPT

## 2023-02-11 PROCEDURE — 96368 THER/DIAG CONCURRENT INF: CPT

## 2023-02-11 PROCEDURE — 86923 COMPATIBILITY TEST ELECTRIC: CPT | Performed by: STUDENT IN AN ORGANIZED HEALTH CARE EDUCATION/TRAINING PROGRAM

## 2023-02-11 PROCEDURE — 86901 BLOOD TYPING SEROLOGIC RH(D): CPT | Performed by: EMERGENCY MEDICINE

## 2023-02-11 PROCEDURE — 86850 RBC ANTIBODY SCREEN: CPT | Performed by: EMERGENCY MEDICINE

## 2023-02-11 PROCEDURE — 85610 PROTHROMBIN TIME: CPT | Performed by: EMERGENCY MEDICINE

## 2023-02-11 PROCEDURE — 99291 CRITICAL CARE FIRST HOUR: CPT | Mod: 25

## 2023-02-11 PROCEDURE — 96365 THER/PROPH/DIAG IV INF INIT: CPT

## 2023-02-11 PROCEDURE — 120N000003 HC R&B IMCU UMMC

## 2023-02-11 PROCEDURE — 36415 COLL VENOUS BLD VENIPUNCTURE: CPT | Performed by: EMERGENCY MEDICINE

## 2023-02-11 PROCEDURE — 83605 ASSAY OF LACTIC ACID: CPT | Performed by: EMERGENCY MEDICINE

## 2023-02-11 PROCEDURE — 85018 HEMOGLOBIN: CPT | Performed by: STUDENT IN AN ORGANIZED HEALTH CARE EDUCATION/TRAINING PROGRAM

## 2023-02-11 PROCEDURE — 84132 ASSAY OF SERUM POTASSIUM: CPT | Performed by: STUDENT IN AN ORGANIZED HEALTH CARE EDUCATION/TRAINING PROGRAM

## 2023-02-11 PROCEDURE — 87040 BLOOD CULTURE FOR BACTERIA: CPT | Performed by: EMERGENCY MEDICINE

## 2023-02-11 PROCEDURE — 99223 1ST HOSP IP/OBS HIGH 75: CPT | Mod: GC | Performed by: INTERNAL MEDICINE

## 2023-02-11 PROCEDURE — 36415 COLL VENOUS BLD VENIPUNCTURE: CPT | Performed by: STUDENT IN AN ORGANIZED HEALTH CARE EDUCATION/TRAINING PROGRAM

## 2023-02-11 PROCEDURE — 99223 1ST HOSP IP/OBS HIGH 75: CPT | Mod: AI | Performed by: HOSPITALIST

## 2023-02-11 PROCEDURE — 80053 COMPREHEN METABOLIC PANEL: CPT | Performed by: EMERGENCY MEDICINE

## 2023-02-11 PROCEDURE — 82077 ASSAY SPEC XCP UR&BREATH IA: CPT | Performed by: EMERGENCY MEDICINE

## 2023-02-11 RX ORDER — DEXTROSE MONOHYDRATE 25 G/50ML
25-50 INJECTION, SOLUTION INTRAVENOUS
Status: DISCONTINUED | OUTPATIENT
Start: 2023-02-11 | End: 2023-02-12

## 2023-02-11 RX ORDER — ONDANSETRON 4 MG/1
4 TABLET, ORALLY DISINTEGRATING ORAL EVERY 6 HOURS PRN
Status: DISCONTINUED | OUTPATIENT
Start: 2023-02-11 | End: 2023-02-28

## 2023-02-11 RX ORDER — LACTULOSE 10 G/15ML
20 SOLUTION ORAL
Status: DISCONTINUED | OUTPATIENT
Start: 2023-02-11 | End: 2023-02-12

## 2023-02-11 RX ORDER — LACTULOSE 10 G/15ML
30 SOLUTION ORAL ONCE
Status: COMPLETED | OUTPATIENT
Start: 2023-02-11 | End: 2023-02-11

## 2023-02-11 RX ORDER — LACTULOSE 10 G/10G
20 SOLUTION ORAL 3 TIMES DAILY
Status: DISCONTINUED | OUTPATIENT
Start: 2023-02-12 | End: 2023-02-11

## 2023-02-11 RX ORDER — CEFTRIAXONE 1 G/1
1 INJECTION, POWDER, FOR SOLUTION INTRAMUSCULAR; INTRAVENOUS ONCE
Status: COMPLETED | OUTPATIENT
Start: 2023-02-11 | End: 2023-02-11

## 2023-02-11 RX ORDER — NICOTINE POLACRILEX 4 MG
15-30 LOZENGE BUCCAL
Status: DISCONTINUED | OUTPATIENT
Start: 2023-02-11 | End: 2023-02-14

## 2023-02-11 RX ORDER — OCTREOTIDE ACETATE 50 UG/ML
50 INJECTION, SOLUTION INTRAVENOUS; SUBCUTANEOUS ONCE
Status: COMPLETED | OUTPATIENT
Start: 2023-02-11 | End: 2023-02-11

## 2023-02-11 RX ORDER — FOLIC ACID 1 MG/1
1 TABLET ORAL DAILY
Status: DISCONTINUED | OUTPATIENT
Start: 2023-02-12 | End: 2023-02-28

## 2023-02-11 RX ORDER — ONDANSETRON 2 MG/ML
4 INJECTION INTRAMUSCULAR; INTRAVENOUS EVERY 6 HOURS PRN
Status: DISCONTINUED | OUTPATIENT
Start: 2023-02-11 | End: 2023-02-28

## 2023-02-11 RX ORDER — LACTULOSE 10 G/15ML
100 SOLUTION ORAL
Status: DISCONTINUED | OUTPATIENT
Start: 2023-02-11 | End: 2023-02-12

## 2023-02-11 RX ORDER — LIDOCAINE 40 MG/G
CREAM TOPICAL
Status: DISCONTINUED | OUTPATIENT
Start: 2023-02-11 | End: 2023-02-28

## 2023-02-11 RX ORDER — DEXTROSE MONOHYDRATE 25 G/50ML
25-50 INJECTION, SOLUTION INTRAVENOUS
Status: DISCONTINUED | OUTPATIENT
Start: 2023-02-11 | End: 2023-02-14

## 2023-02-11 RX ORDER — NICOTINE POLACRILEX 4 MG
15-30 LOZENGE BUCCAL
Status: DISCONTINUED | OUTPATIENT
Start: 2023-02-11 | End: 2023-02-12

## 2023-02-11 RX ORDER — FUROSEMIDE 10 MG/ML
20 INJECTION INTRAMUSCULAR; INTRAVENOUS ONCE
Status: COMPLETED | OUTPATIENT
Start: 2023-02-12 | End: 2023-02-12

## 2023-02-11 RX ORDER — SPIRONOLACTONE 25 MG/1
100 TABLET ORAL AT BEDTIME
Status: DISCONTINUED | OUTPATIENT
Start: 2023-02-11 | End: 2023-02-28

## 2023-02-11 RX ORDER — BUMETANIDE 1 MG/1
1 TABLET ORAL DAILY
Status: DISCONTINUED | OUTPATIENT
Start: 2023-02-12 | End: 2023-02-16

## 2023-02-11 RX ORDER — AMOXICILLIN 250 MG
2 CAPSULE ORAL 2 TIMES DAILY PRN
Status: DISCONTINUED | OUTPATIENT
Start: 2023-02-11 | End: 2023-02-28

## 2023-02-11 RX ORDER — ACETAMINOPHEN 325 MG/1
650 TABLET ORAL EVERY 8 HOURS
Status: DISCONTINUED | OUTPATIENT
Start: 2023-02-12 | End: 2023-02-12

## 2023-02-11 RX ORDER — AMOXICILLIN 250 MG
1 CAPSULE ORAL 2 TIMES DAILY PRN
Status: DISCONTINUED | OUTPATIENT
Start: 2023-02-11 | End: 2023-02-12

## 2023-02-11 RX ADMIN — PANTOPRAZOLE SODIUM 80 MG: 40 INJECTION, POWDER, FOR SOLUTION INTRAVENOUS at 19:09

## 2023-02-11 RX ADMIN — Medication 50 MCG/HR: at 19:05

## 2023-02-11 RX ADMIN — LACTULOSE 30 G: 10 SOLUTION ORAL at 20:11

## 2023-02-11 RX ADMIN — CEFTRIAXONE 1 G: 1 INJECTION, POWDER, FOR SOLUTION INTRAMUSCULAR; INTRAVENOUS at 19:08

## 2023-02-11 RX ADMIN — ONDANSETRON 4 MG: 4 TABLET, ORALLY DISINTEGRATING ORAL at 23:29

## 2023-02-11 RX ADMIN — OCTREOTIDE ACETATE 50 MCG: 50 INJECTION, SOLUTION INTRAVENOUS; SUBCUTANEOUS at 18:58

## 2023-02-11 RX ADMIN — SODIUM CHLORIDE 8 MG/HR: 9 INJECTION, SOLUTION INTRAVENOUS at 19:45

## 2023-02-11 ASSESSMENT — ACTIVITIES OF DAILY LIVING (ADL)
ADLS_ACUITY_SCORE: 35
ADLS_ACUITY_SCORE: 41
ADLS_ACUITY_SCORE: 35

## 2023-02-12 ENCOUNTER — ANESTHESIA (OUTPATIENT)
Dept: SURGERY | Facility: CLINIC | Age: 28
DRG: 005 | End: 2023-02-12
Payer: COMMERCIAL

## 2023-02-12 ENCOUNTER — APPOINTMENT (OUTPATIENT)
Dept: GENERAL RADIOLOGY | Facility: CLINIC | Age: 28
DRG: 005 | End: 2023-02-12
Payer: COMMERCIAL

## 2023-02-12 ENCOUNTER — APPOINTMENT (OUTPATIENT)
Dept: ULTRASOUND IMAGING | Facility: CLINIC | Age: 28
DRG: 005 | End: 2023-02-12
Attending: STUDENT IN AN ORGANIZED HEALTH CARE EDUCATION/TRAINING PROGRAM
Payer: COMMERCIAL

## 2023-02-12 ENCOUNTER — ANESTHESIA EVENT (OUTPATIENT)
Dept: SURGERY | Facility: CLINIC | Age: 28
DRG: 005 | End: 2023-02-12
Payer: COMMERCIAL

## 2023-02-12 PROBLEM — K92.2 GI BLEED: Status: RESOLVED | Noted: 2023-01-26 | Resolved: 2023-02-12

## 2023-02-12 PROBLEM — R65.10 SIRS (SYSTEMIC INFLAMMATORY RESPONSE SYNDROME) (H): Status: RESOLVED | Noted: 2022-07-28 | Resolved: 2023-02-12

## 2023-02-12 PROBLEM — F84.0 AUTISM SPECTRUM DISORDER: Status: ACTIVE | Noted: 2022-04-08

## 2023-02-12 PROBLEM — F10.20 ALCOHOL USE DISORDER, SEVERE, DEPENDENCE (H): Status: RESOLVED | Noted: 2022-07-11 | Resolved: 2023-02-12

## 2023-02-12 PROBLEM — D62 ACUTE POSTHEMORRHAGIC ANEMIA: Status: ACTIVE | Noted: 2022-06-17

## 2023-02-12 PROBLEM — D64.9 ANEMIA, UNSPECIFIED TYPE: Status: RESOLVED | Noted: 2023-01-21 | Resolved: 2023-02-12

## 2023-02-12 LAB
ALBUMIN SERPL BCG-MCNC: 3.4 G/DL (ref 3.5–5.2)
ALP SERPL-CCNC: 113 U/L (ref 40–129)
ALT SERPL W P-5'-P-CCNC: 42 U/L (ref 10–50)
ANION GAP SERPL CALCULATED.3IONS-SCNC: 12 MMOL/L (ref 7–15)
APTT PPP: 41 SECONDS (ref 22–38)
AST SERPL W P-5'-P-CCNC: 74 U/L (ref 10–50)
BASE EXCESS BLDV CALC-SCNC: 1 MMOL/L (ref -7.7–1.9)
BASE EXCESS BLDV CALC-SCNC: 2 MMOL/L (ref -8.1–1.9)
BILIRUB SERPL-MCNC: 8.5 MG/DL
BLD PROD TYP BPU: NORMAL
BLD PROD TYP BPU: NORMAL
BLOOD COMPONENT TYPE: NORMAL
BLOOD COMPONENT TYPE: NORMAL
BUN SERPL-MCNC: 37.3 MG/DL (ref 6–20)
CA-I BLD-MCNC: 4.7 MG/DL (ref 4.4–5.2)
CALCIUM SERPL-MCNC: 9.3 MG/DL (ref 8.6–10)
CHLORIDE SERPL-SCNC: 97 MMOL/L (ref 98–107)
CODING SYSTEM: NORMAL
CODING SYSTEM: NORMAL
CREAT SERPL-MCNC: 1.22 MG/DL (ref 0.67–1.17)
CROSSMATCH: NORMAL
CROSSMATCH: NORMAL
DEPRECATED HCO3 PLAS-SCNC: 22 MMOL/L (ref 22–29)
ERYTHROCYTE [DISTWIDTH] IN BLOOD BY AUTOMATED COUNT: 16.9 % (ref 10–15)
FIBRINOGEN PPP-MCNC: 244 MG/DL (ref 170–490)
GFR SERPL CREATININE-BSD FRML MDRD: 82 ML/MIN/1.73M2
GLUCOSE BLD-MCNC: 186 MG/DL (ref 70–99)
GLUCOSE BLDC GLUCOMTR-MCNC: 216 MG/DL (ref 70–99)
GLUCOSE BLDC GLUCOMTR-MCNC: 243 MG/DL (ref 70–99)
GLUCOSE BLDC GLUCOMTR-MCNC: 248 MG/DL (ref 70–99)
GLUCOSE BLDC GLUCOMTR-MCNC: 263 MG/DL (ref 70–99)
GLUCOSE BLDC GLUCOMTR-MCNC: 282 MG/DL (ref 70–99)
GLUCOSE BLDC GLUCOMTR-MCNC: 292 MG/DL (ref 70–99)
GLUCOSE BLDC GLUCOMTR-MCNC: 300 MG/DL (ref 70–99)
GLUCOSE BLDC GLUCOMTR-MCNC: 312 MG/DL (ref 70–99)
GLUCOSE BLDC GLUCOMTR-MCNC: 350 MG/DL (ref 70–99)
GLUCOSE BLDC GLUCOMTR-MCNC: 351 MG/DL (ref 70–99)
GLUCOSE BLDC GLUCOMTR-MCNC: 392 MG/DL (ref 70–99)
GLUCOSE SERPL-MCNC: 261 MG/DL (ref 70–99)
HCO3 BLDV-SCNC: 26 MMOL/L (ref 21–28)
HCO3 BLDV-SCNC: 26 MMOL/L (ref 21–28)
HCT VFR BLD AUTO: 23.2 % (ref 40–53)
HGB BLD-MCNC: 6.9 G/DL (ref 13.3–17.7)
HGB BLD-MCNC: 7.6 G/DL (ref 13.3–17.7)
HGB BLD-MCNC: 7.7 G/DL (ref 13.3–17.7)
HGB BLD-MCNC: 7.7 G/DL (ref 13.3–17.7)
HOLD SPECIMEN: NORMAL
HOLD SPECIMEN: NORMAL
INR PPP: 1.55 (ref 0.85–1.15)
ISSUE DATE AND TIME: NORMAL
ISSUE DATE AND TIME: NORMAL
LACTATE BLD-SCNC: 2.8 MMOL/L
LACTATE SERPL-SCNC: 2.7 MMOL/L (ref 0.7–2)
LACTATE SERPL-SCNC: 3.5 MMOL/L (ref 0.7–2)
MAGNESIUM SERPL-MCNC: 1.6 MG/DL (ref 1.7–2.3)
MCH RBC QN AUTO: 33.6 PG (ref 26.5–33)
MCHC RBC AUTO-ENTMCNC: 32.8 G/DL (ref 31.5–36.5)
MCV RBC AUTO: 103 FL (ref 78–100)
O2/TOTAL GAS SETTING VFR VENT: 100 %
O2/TOTAL GAS SETTING VFR VENT: 21 %
PCO2 BLDV: 37 MM HG (ref 40–50)
PCO2 BLDV: 43 MM HG (ref 40–50)
PH BLDV: 7.39 [PH] (ref 7.32–7.43)
PH BLDV: 7.45 [PH] (ref 7.32–7.43)
PHOSPHATE SERPL-MCNC: 4.6 MG/DL (ref 2.5–4.5)
PLATELET # BLD AUTO: 132 10E3/UL (ref 150–450)
PO2 BLDV: 239 MM HG (ref 25–47)
PO2 BLDV: 59 MM HG (ref 25–47)
POTASSIUM BLD-SCNC: 4.3 MMOL/L (ref 3.5–5)
POTASSIUM SERPL-SCNC: 5.4 MMOL/L (ref 3.4–5.3)
POTASSIUM SERPL-SCNC: 5.4 MMOL/L (ref 3.4–5.3)
POTASSIUM SERPL-SCNC: 5.5 MMOL/L (ref 3.4–5.3)
POTASSIUM SERPL-SCNC: 5.9 MMOL/L (ref 3.4–5.3)
POTASSIUM SERPL-SCNC: 6.2 MMOL/L (ref 3.4–5.3)
POTASSIUM SERPL-SCNC: 6.3 MMOL/L (ref 3.4–5.3)
PROT SERPL-MCNC: 6.9 G/DL (ref 6.4–8.3)
RBC # BLD AUTO: 2.26 10E6/UL (ref 4.4–5.9)
SODIUM BLD-SCNC: 135 MMOL/L (ref 133–144)
SODIUM SERPL-SCNC: 131 MMOL/L (ref 136–145)
UNIT ABO/RH: NORMAL
UNIT ABO/RH: NORMAL
UNIT NUMBER: NORMAL
UNIT NUMBER: NORMAL
UNIT STATUS: NORMAL
UNIT STATUS: NORMAL
UNIT TYPE ISBT: 600
UNIT TYPE ISBT: 600
UPPER GI ENDOSCOPY: NORMAL
WBC # BLD AUTO: 7.9 10E3/UL (ref 4–11)

## 2023-02-12 PROCEDURE — 250N000012 HC RX MED GY IP 250 OP 636 PS 637: Performed by: INTERNAL MEDICINE

## 2023-02-12 PROCEDURE — 84132 ASSAY OF SERUM POTASSIUM: CPT

## 2023-02-12 PROCEDURE — 250N000009 HC RX 250

## 2023-02-12 PROCEDURE — 93975 VASCULAR STUDY: CPT

## 2023-02-12 PROCEDURE — 85730 THROMBOPLASTIN TIME PARTIAL: CPT

## 2023-02-12 PROCEDURE — 250N000011 HC RX IP 250 OP 636: Performed by: STUDENT IN AN ORGANIZED HEALTH CARE EDUCATION/TRAINING PROGRAM

## 2023-02-12 PROCEDURE — 250N000013 HC RX MED GY IP 250 OP 250 PS 637

## 2023-02-12 PROCEDURE — 360N000075 HC SURGERY LEVEL 2, PER MIN: Performed by: INTERNAL MEDICINE

## 2023-02-12 PROCEDURE — 999N000065 XR ABDOMEN PORT 1 VIEW

## 2023-02-12 PROCEDURE — 272N000001 HC OR GENERAL SUPPLY STERILE: Performed by: INTERNAL MEDICINE

## 2023-02-12 PROCEDURE — 370N000017 HC ANESTHESIA TECHNICAL FEE, PER MIN: Performed by: INTERNAL MEDICINE

## 2023-02-12 PROCEDURE — 83605 ASSAY OF LACTIC ACID: CPT | Performed by: STUDENT IN AN ORGANIZED HEALTH CARE EDUCATION/TRAINING PROGRAM

## 2023-02-12 PROCEDURE — C9113 INJ PANTOPRAZOLE SODIUM, VIA: HCPCS | Performed by: STUDENT IN AN ORGANIZED HEALTH CARE EDUCATION/TRAINING PROGRAM

## 2023-02-12 PROCEDURE — 99233 SBSQ HOSP IP/OBS HIGH 50: CPT | Mod: GC | Performed by: INTERNAL MEDICINE

## 2023-02-12 PROCEDURE — 120N000003 HC R&B IMCU UMMC

## 2023-02-12 PROCEDURE — 258N000003 HC RX IP 258 OP 636

## 2023-02-12 PROCEDURE — 250N000011 HC RX IP 250 OP 636: Performed by: NURSE ANESTHETIST, CERTIFIED REGISTERED

## 2023-02-12 PROCEDURE — 93975 VASCULAR STUDY: CPT | Mod: 26 | Performed by: RADIOLOGY

## 2023-02-12 PROCEDURE — 250N000009 HC RX 250: Performed by: INTERNAL MEDICINE

## 2023-02-12 PROCEDURE — 93010 ELECTROCARDIOGRAM REPORT: CPT | Performed by: INTERNAL MEDICINE

## 2023-02-12 PROCEDURE — 36415 COLL VENOUS BLD VENIPUNCTURE: CPT

## 2023-02-12 PROCEDURE — 99232 SBSQ HOSP IP/OBS MODERATE 35: CPT | Mod: 25 | Performed by: INTERNAL MEDICINE

## 2023-02-12 PROCEDURE — 83605 ASSAY OF LACTIC ACID: CPT

## 2023-02-12 PROCEDURE — 93005 ELECTROCARDIOGRAM TRACING: CPT

## 2023-02-12 PROCEDURE — 82330 ASSAY OF CALCIUM: CPT

## 2023-02-12 PROCEDURE — 83735 ASSAY OF MAGNESIUM: CPT

## 2023-02-12 PROCEDURE — 74018 RADEX ABDOMEN 1 VIEW: CPT | Mod: 26 | Performed by: RADIOLOGY

## 2023-02-12 PROCEDURE — 258N000001 HC RX 258

## 2023-02-12 PROCEDURE — 258N000003 HC RX IP 258 OP 636: Performed by: STUDENT IN AN ORGANIZED HEALTH CARE EDUCATION/TRAINING PROGRAM

## 2023-02-12 PROCEDURE — 85610 PROTHROMBIN TIME: CPT | Performed by: STUDENT IN AN ORGANIZED HEALTH CARE EDUCATION/TRAINING PROGRAM

## 2023-02-12 PROCEDURE — 85027 COMPLETE CBC AUTOMATED: CPT | Performed by: STUDENT IN AN ORGANIZED HEALTH CARE EDUCATION/TRAINING PROGRAM

## 2023-02-12 PROCEDURE — 82803 BLOOD GASES ANY COMBINATION: CPT

## 2023-02-12 PROCEDURE — 0DJ08ZZ INSPECTION OF UPPER INTESTINAL TRACT, VIA NATURAL OR ARTIFICIAL OPENING ENDOSCOPIC: ICD-10-PCS | Performed by: INTERNAL MEDICINE

## 2023-02-12 PROCEDURE — 250N000025 HC SEVOFLURANE, PER MIN: Performed by: INTERNAL MEDICINE

## 2023-02-12 PROCEDURE — 250N000011 HC RX IP 250 OP 636

## 2023-02-12 PROCEDURE — 36415 COLL VENOUS BLD VENIPUNCTURE: CPT | Performed by: STUDENT IN AN ORGANIZED HEALTH CARE EDUCATION/TRAINING PROGRAM

## 2023-02-12 PROCEDURE — 258N000001 HC RX 258: Performed by: STUDENT IN AN ORGANIZED HEALTH CARE EDUCATION/TRAINING PROGRAM

## 2023-02-12 PROCEDURE — 710N000010 HC RECOVERY PHASE 1, LEVEL 2, PER MIN: Performed by: INTERNAL MEDICINE

## 2023-02-12 PROCEDURE — 250N000012 HC RX MED GY IP 250 OP 636 PS 637: Performed by: STUDENT IN AN ORGANIZED HEALTH CARE EDUCATION/TRAINING PROGRAM

## 2023-02-12 PROCEDURE — 250N000013 HC RX MED GY IP 250 OP 250 PS 637: Performed by: STUDENT IN AN ORGANIZED HEALTH CARE EDUCATION/TRAINING PROGRAM

## 2023-02-12 PROCEDURE — 85018 HEMOGLOBIN: CPT | Performed by: STUDENT IN AN ORGANIZED HEALTH CARE EDUCATION/TRAINING PROGRAM

## 2023-02-12 PROCEDURE — 84100 ASSAY OF PHOSPHORUS: CPT

## 2023-02-12 PROCEDURE — 250N000009 HC RX 250: Performed by: NURSE ANESTHETIST, CERTIFIED REGISTERED

## 2023-02-12 PROCEDURE — 258N000003 HC RX IP 258 OP 636: Performed by: NURSE ANESTHETIST, CERTIFIED REGISTERED

## 2023-02-12 PROCEDURE — 84132 ASSAY OF SERUM POTASSIUM: CPT | Performed by: STUDENT IN AN ORGANIZED HEALTH CARE EDUCATION/TRAINING PROGRAM

## 2023-02-12 PROCEDURE — P9016 RBC LEUKOCYTES REDUCED: HCPCS

## 2023-02-12 PROCEDURE — 258N000003 HC RX IP 258 OP 636: Performed by: ANESTHESIOLOGY

## 2023-02-12 PROCEDURE — 85384 FIBRINOGEN ACTIVITY: CPT | Performed by: STUDENT IN AN ORGANIZED HEALTH CARE EDUCATION/TRAINING PROGRAM

## 2023-02-12 RX ORDER — NALOXONE HYDROCHLORIDE 0.4 MG/ML
0.2 INJECTION, SOLUTION INTRAMUSCULAR; INTRAVENOUS; SUBCUTANEOUS
Status: DISCONTINUED | OUTPATIENT
Start: 2023-02-12 | End: 2023-02-28

## 2023-02-12 RX ORDER — ONDANSETRON 4 MG/1
4 TABLET, ORALLY DISINTEGRATING ORAL EVERY 30 MIN PRN
Status: DISCONTINUED | OUTPATIENT
Start: 2023-02-12 | End: 2023-02-12 | Stop reason: HOSPADM

## 2023-02-12 RX ORDER — OXYCODONE HYDROCHLORIDE 5 MG/1
5 TABLET ORAL ONCE
Status: COMPLETED | OUTPATIENT
Start: 2023-02-12 | End: 2023-02-12

## 2023-02-12 RX ORDER — OXYCODONE HYDROCHLORIDE 10 MG/1
10 TABLET ORAL EVERY 4 HOURS PRN
Status: DISCONTINUED | OUTPATIENT
Start: 2023-02-12 | End: 2023-02-12 | Stop reason: HOSPADM

## 2023-02-12 RX ORDER — FLUOXETINE 20 MG/5ML
60 SOLUTION ORAL AT BEDTIME
Status: DISCONTINUED | OUTPATIENT
Start: 2023-02-12 | End: 2023-02-13 | Stop reason: DRUGHIGH

## 2023-02-12 RX ORDER — FUROSEMIDE 10 MG/ML
40 INJECTION INTRAMUSCULAR; INTRAVENOUS ONCE
Status: COMPLETED | OUTPATIENT
Start: 2023-02-12 | End: 2023-02-12

## 2023-02-12 RX ORDER — ONDANSETRON 2 MG/ML
4 INJECTION INTRAMUSCULAR; INTRAVENOUS EVERY 30 MIN PRN
Status: DISCONTINUED | OUTPATIENT
Start: 2023-02-12 | End: 2023-02-12 | Stop reason: HOSPADM

## 2023-02-12 RX ORDER — FENTANYL CITRATE 50 UG/ML
25 INJECTION, SOLUTION INTRAMUSCULAR; INTRAVENOUS EVERY 5 MIN PRN
Status: DISCONTINUED | OUTPATIENT
Start: 2023-02-12 | End: 2023-02-12 | Stop reason: HOSPADM

## 2023-02-12 RX ORDER — DEXTROSE MONOHYDRATE 100 MG/ML
INJECTION, SOLUTION INTRAVENOUS CONTINUOUS PRN
Status: DISCONTINUED | OUTPATIENT
Start: 2023-02-12 | End: 2023-02-22

## 2023-02-12 RX ORDER — DEXTROSE MONOHYDRATE 25 G/50ML
25 INJECTION, SOLUTION INTRAVENOUS ONCE
Status: COMPLETED | OUTPATIENT
Start: 2023-02-12 | End: 2023-02-12

## 2023-02-12 RX ORDER — LACTULOSE 10 G/15ML
20 SOLUTION ORAL
Status: DISCONTINUED | OUTPATIENT
Start: 2023-02-12 | End: 2023-02-12

## 2023-02-12 RX ORDER — MAGNESIUM SULFATE HEPTAHYDRATE 40 MG/ML
2 INJECTION, SOLUTION INTRAVENOUS ONCE
Status: COMPLETED | OUTPATIENT
Start: 2023-02-12 | End: 2023-02-12

## 2023-02-12 RX ORDER — CEFTRIAXONE 1 G/1
1 INJECTION, POWDER, FOR SOLUTION INTRAMUSCULAR; INTRAVENOUS EVERY 24 HOURS
Status: COMPLETED | OUTPATIENT
Start: 2023-02-12 | End: 2023-02-17

## 2023-02-12 RX ORDER — SODIUM CHLORIDE, SODIUM GLUCONATE, SODIUM ACETATE, POTASSIUM CHLORIDE AND MAGNESIUM CHLORIDE 526; 502; 368; 37; 30 MG/100ML; MG/100ML; MG/100ML; MG/100ML; MG/100ML
INJECTION, SOLUTION INTRAVENOUS CONTINUOUS PRN
Status: DISCONTINUED | OUTPATIENT
Start: 2023-02-12 | End: 2023-02-12

## 2023-02-12 RX ORDER — ONDANSETRON 2 MG/ML
INJECTION INTRAMUSCULAR; INTRAVENOUS PRN
Status: DISCONTINUED | OUTPATIENT
Start: 2023-02-12 | End: 2023-02-12

## 2023-02-12 RX ORDER — LACTULOSE 10 G/15ML
100 SOLUTION ORAL
Status: DISCONTINUED | OUTPATIENT
Start: 2023-02-12 | End: 2023-02-28

## 2023-02-12 RX ORDER — FENTANYL CITRATE 50 UG/ML
50 INJECTION, SOLUTION INTRAMUSCULAR; INTRAVENOUS EVERY 5 MIN PRN
Status: DISCONTINUED | OUTPATIENT
Start: 2023-02-12 | End: 2023-02-12 | Stop reason: HOSPADM

## 2023-02-12 RX ORDER — LACTULOSE 10 G/15ML
100 SOLUTION ORAL
Status: DISCONTINUED | OUTPATIENT
Start: 2023-02-12 | End: 2023-02-12

## 2023-02-12 RX ORDER — METOCLOPRAMIDE HYDROCHLORIDE 5 MG/ML
10 INJECTION INTRAMUSCULAR; INTRAVENOUS ONCE
Status: COMPLETED | OUTPATIENT
Start: 2023-02-12 | End: 2023-02-12

## 2023-02-12 RX ORDER — SODIUM CHLORIDE, SODIUM LACTATE, POTASSIUM CHLORIDE, CALCIUM CHLORIDE 600; 310; 30; 20 MG/100ML; MG/100ML; MG/100ML; MG/100ML
INJECTION, SOLUTION INTRAVENOUS CONTINUOUS
Status: DISCONTINUED | OUTPATIENT
Start: 2023-02-12 | End: 2023-02-12 | Stop reason: HOSPADM

## 2023-02-12 RX ORDER — LACTULOSE 10 G/10G
20 SOLUTION ORAL
Status: DISCONTINUED | OUTPATIENT
Start: 2023-02-12 | End: 2023-02-28

## 2023-02-12 RX ORDER — HYDROMORPHONE HCL IN WATER/PF 6 MG/30 ML
0.2 PATIENT CONTROLLED ANALGESIA SYRINGE INTRAVENOUS EVERY 5 MIN PRN
Status: DISCONTINUED | OUTPATIENT
Start: 2023-02-12 | End: 2023-02-12 | Stop reason: HOSPADM

## 2023-02-12 RX ORDER — HYDROMORPHONE HCL IN WATER/PF 6 MG/30 ML
0.4 PATIENT CONTROLLED ANALGESIA SYRINGE INTRAVENOUS EVERY 5 MIN PRN
Status: DISCONTINUED | OUTPATIENT
Start: 2023-02-12 | End: 2023-02-12 | Stop reason: HOSPADM

## 2023-02-12 RX ORDER — LIDOCAINE HYDROCHLORIDE 20 MG/ML
5 SOLUTION OROPHARYNGEAL ONCE
Status: COMPLETED | OUTPATIENT
Start: 2023-02-12 | End: 2023-02-12

## 2023-02-12 RX ORDER — DEXAMETHASONE SODIUM PHOSPHATE 4 MG/ML
INJECTION, SOLUTION INTRA-ARTICULAR; INTRALESIONAL; INTRAMUSCULAR; INTRAVENOUS; SOFT TISSUE PRN
Status: DISCONTINUED | OUTPATIENT
Start: 2023-02-12 | End: 2023-02-12

## 2023-02-12 RX ORDER — MULTIVITAMIN,THERAPEUTIC
1 TABLET ORAL DAILY
Status: DISCONTINUED | OUTPATIENT
Start: 2023-02-13 | End: 2023-02-28

## 2023-02-12 RX ORDER — ACETAMINOPHEN 325 MG/10.15ML
650 LIQUID ORAL EVERY 8 HOURS
Status: DISCONTINUED | OUTPATIENT
Start: 2023-02-12 | End: 2023-02-17

## 2023-02-12 RX ORDER — OXYCODONE HYDROCHLORIDE 5 MG/1
5 TABLET ORAL EVERY 4 HOURS PRN
Status: DISCONTINUED | OUTPATIENT
Start: 2023-02-12 | End: 2023-02-12 | Stop reason: HOSPADM

## 2023-02-12 RX ORDER — HYDRALAZINE HYDROCHLORIDE 20 MG/ML
2.5-5 INJECTION INTRAMUSCULAR; INTRAVENOUS EVERY 10 MIN PRN
Status: DISCONTINUED | OUTPATIENT
Start: 2023-02-12 | End: 2023-02-12 | Stop reason: HOSPADM

## 2023-02-12 RX ORDER — NICOTINE POLACRILEX 4 MG
15-30 LOZENGE BUCCAL
Status: DISCONTINUED | OUTPATIENT
Start: 2023-02-12 | End: 2023-02-12

## 2023-02-12 RX ORDER — FENTANYL CITRATE 50 UG/ML
INJECTION, SOLUTION INTRAMUSCULAR; INTRAVENOUS PRN
Status: DISCONTINUED | OUTPATIENT
Start: 2023-02-12 | End: 2023-02-12

## 2023-02-12 RX ORDER — PROPOFOL 10 MG/ML
INJECTION, EMULSION INTRAVENOUS PRN
Status: DISCONTINUED | OUTPATIENT
Start: 2023-02-12 | End: 2023-02-12

## 2023-02-12 RX ORDER — NALOXONE HYDROCHLORIDE 0.4 MG/ML
0.4 INJECTION, SOLUTION INTRAMUSCULAR; INTRAVENOUS; SUBCUTANEOUS
Status: DISCONTINUED | OUTPATIENT
Start: 2023-02-12 | End: 2023-02-28

## 2023-02-12 RX ORDER — LABETALOL HYDROCHLORIDE 5 MG/ML
10 INJECTION, SOLUTION INTRAVENOUS
Status: DISCONTINUED | OUTPATIENT
Start: 2023-02-12 | End: 2023-02-12 | Stop reason: HOSPADM

## 2023-02-12 RX ORDER — DEXTROSE MONOHYDRATE 25 G/50ML
25-50 INJECTION, SOLUTION INTRAVENOUS
Status: DISCONTINUED | OUTPATIENT
Start: 2023-02-12 | End: 2023-02-12

## 2023-02-12 RX ORDER — FLUMAZENIL 0.1 MG/ML
0.2 INJECTION, SOLUTION INTRAVENOUS
Status: ACTIVE | OUTPATIENT
Start: 2023-02-12 | End: 2023-02-12

## 2023-02-12 RX ORDER — LACTULOSE 10 G/10G
20 SOLUTION ORAL 3 TIMES DAILY
Status: DISCONTINUED | OUTPATIENT
Start: 2023-02-12 | End: 2023-02-14

## 2023-02-12 RX ORDER — NALOXONE HYDROCHLORIDE 0.4 MG/ML
0.4 INJECTION, SOLUTION INTRAMUSCULAR; INTRAVENOUS; SUBCUTANEOUS
Status: DISCONTINUED | OUTPATIENT
Start: 2023-02-12 | End: 2023-02-12

## 2023-02-12 RX ORDER — ALBUTEROL SULFATE 5 MG/ML
10 SOLUTION RESPIRATORY (INHALATION) ONCE
Status: COMPLETED | OUTPATIENT
Start: 2023-02-12 | End: 2023-02-12

## 2023-02-12 RX ADMIN — FUROSEMIDE 20 MG: 10 INJECTION, SOLUTION INTRAVENOUS at 00:18

## 2023-02-12 RX ADMIN — PANTOPRAZOLE SODIUM 40 MG: 40 INJECTION, POWDER, FOR SOLUTION INTRAVENOUS at 08:59

## 2023-02-12 RX ADMIN — FUROSEMIDE 40 MG: 10 INJECTION, SOLUTION INTRAVENOUS at 08:59

## 2023-02-12 RX ADMIN — HUMAN INSULIN 7.03 UNITS: 100 INJECTION, SOLUTION SUBCUTANEOUS at 01:06

## 2023-02-12 RX ADMIN — ACETAMINOPHEN 650 MG: 160 SOLUTION ORAL at 18:32

## 2023-02-12 RX ADMIN — SODIUM BICARBONATE 50 MEQ: 84 INJECTION INTRAVENOUS at 09:22

## 2023-02-12 RX ADMIN — PHENYLEPHRINE HYDROCHLORIDE 100 MCG: 10 INJECTION INTRAVENOUS at 02:07

## 2023-02-12 RX ADMIN — INSULIN GLARGINE 10 UNITS: 100 INJECTION, SOLUTION SUBCUTANEOUS at 20:04

## 2023-02-12 RX ADMIN — RIFAXIMIN 550 MG: 550 TABLET ORAL at 20:05

## 2023-02-12 RX ADMIN — SODIUM CHLORIDE 500 ML: 9 INJECTION, SOLUTION INTRAVENOUS at 08:01

## 2023-02-12 RX ADMIN — MIDAZOLAM 2 MG: 1 INJECTION INTRAMUSCULAR; INTRAVENOUS at 02:00

## 2023-02-12 RX ADMIN — LACTULOSE 20 G: 10 POWDER, FOR SOLUTION ORAL at 17:18

## 2023-02-12 RX ADMIN — DEXTROSE MONOHYDRATE 25 G: 25 INJECTION, SOLUTION INTRAVENOUS at 08:46

## 2023-02-12 RX ADMIN — FLUOXETINE HYDROCHLORIDE 60 MG: 20 SOLUTION ORAL at 22:19

## 2023-02-12 RX ADMIN — INSULIN GLARGINE 10 UNITS: 100 INJECTION, SOLUTION SUBCUTANEOUS at 10:01

## 2023-02-12 RX ADMIN — PANTOPRAZOLE SODIUM 40 MG: 40 INJECTION, POWDER, FOR SOLUTION INTRAVENOUS at 20:05

## 2023-02-12 RX ADMIN — SODIUM CHLORIDE, POTASSIUM CHLORIDE, SODIUM LACTATE AND CALCIUM CHLORIDE: 600; 310; 30; 20 INJECTION, SOLUTION INTRAVENOUS at 05:05

## 2023-02-12 RX ADMIN — DEXAMETHASONE SODIUM PHOSPHATE 10 MG: 4 INJECTION, SOLUTION INTRA-ARTICULAR; INTRALESIONAL; INTRAMUSCULAR; INTRAVENOUS; SOFT TISSUE at 02:00

## 2023-02-12 RX ADMIN — PROPOFOL 50 MG: 10 INJECTION, EMULSION INTRAVENOUS at 02:00

## 2023-02-12 RX ADMIN — FENTANYL CITRATE 50 MCG: 50 INJECTION, SOLUTION INTRAMUSCULAR; INTRAVENOUS at 02:00

## 2023-02-12 RX ADMIN — ONDANSETRON 4 MG: 2 INJECTION INTRAMUSCULAR; INTRAVENOUS at 02:25

## 2023-02-12 RX ADMIN — DEXTROSE MONOHYDRATE 25 G: 25 INJECTION, SOLUTION INTRAVENOUS at 01:00

## 2023-02-12 RX ADMIN — PHENYLEPHRINE HYDROCHLORIDE 50 MCG: 10 INJECTION INTRAVENOUS at 02:13

## 2023-02-12 RX ADMIN — FUROSEMIDE 40 MG: 10 INJECTION, SOLUTION INTRAVENOUS at 21:21

## 2023-02-12 RX ADMIN — INSULIN ASPART 4 UNITS: 100 INJECTION, SOLUTION INTRAVENOUS; SUBCUTANEOUS at 20:04

## 2023-02-12 RX ADMIN — SODIUM CHLORIDE, SODIUM GLUCONATE, SODIUM ACETATE, POTASSIUM CHLORIDE AND MAGNESIUM CHLORIDE: 526; 502; 368; 37; 30 INJECTION, SOLUTION INTRAVENOUS at 01:52

## 2023-02-12 RX ADMIN — PHENYLEPHRINE HYDROCHLORIDE 100 MCG: 10 INJECTION INTRAVENOUS at 02:00

## 2023-02-12 RX ADMIN — PHENYLEPHRINE HYDROCHLORIDE 100 MCG: 10 INJECTION INTRAVENOUS at 02:25

## 2023-02-12 RX ADMIN — DEXTROSE MONOHYDRATE 300 ML: 100 INJECTION, SOLUTION INTRAVENOUS at 01:05

## 2023-02-12 RX ADMIN — LACTULOSE 20 G: 10 POWDER, FOR SOLUTION ORAL at 20:03

## 2023-02-12 RX ADMIN — HUMAN INSULIN 6.59 UNITS: 100 INJECTION, SOLUTION SUBCUTANEOUS at 21:09

## 2023-02-12 RX ADMIN — PANTOPRAZOLE SODIUM 40 MG: 40 INJECTION, POWDER, FOR SOLUTION INTRAVENOUS at 00:18

## 2023-02-12 RX ADMIN — DEXTROSE MONOHYDRATE 300 ML: 100 INJECTION, SOLUTION INTRAVENOUS at 08:45

## 2023-02-12 RX ADMIN — INSULIN ASPART 5 UNITS: 100 INJECTION, SOLUTION INTRAVENOUS; SUBCUTANEOUS at 12:20

## 2023-02-12 RX ADMIN — LACTULOSE 20 G: 10 POWDER, FOR SOLUTION ORAL at 15:33

## 2023-02-12 RX ADMIN — PHENYLEPHRINE HYDROCHLORIDE 100 MCG: 10 INJECTION INTRAVENOUS at 02:33

## 2023-02-12 RX ADMIN — CEFTRIAXONE SODIUM 1 G: 1 INJECTION, POWDER, FOR SOLUTION INTRAMUSCULAR; INTRAVENOUS at 18:32

## 2023-02-12 RX ADMIN — INSULIN ASPART 4 UNITS: 100 INJECTION, SOLUTION INTRAVENOUS; SUBCUTANEOUS at 17:11

## 2023-02-12 RX ADMIN — HUMAN INSULIN 6.59 UNITS: 100 INJECTION, SOLUTION SUBCUTANEOUS at 08:47

## 2023-02-12 RX ADMIN — OXYCODONE HYDROCHLORIDE 5 MG: 5 TABLET ORAL at 20:53

## 2023-02-12 RX ADMIN — METOCLOPRAMIDE 10 MG: 5 INJECTION, SOLUTION INTRAMUSCULAR; INTRAVENOUS at 05:07

## 2023-02-12 RX ADMIN — PHENYLEPHRINE HYDROCHLORIDE 100 MCG: 10 INJECTION INTRAVENOUS at 02:19

## 2023-02-12 RX ADMIN — OCTREOTIDE ACETATE 50 MCG/HR: 200 INJECTION, SOLUTION INTRAVENOUS; SUBCUTANEOUS at 00:37

## 2023-02-12 RX ADMIN — SUGAMMADEX 200 MG: 100 INJECTION, SOLUTION INTRAVENOUS at 02:34

## 2023-02-12 RX ADMIN — Medication 50 MG: at 02:00

## 2023-02-12 RX ADMIN — MAGNESIUM SULFATE IN WATER 2 G: 40 INJECTION, SOLUTION INTRAVENOUS at 10:00

## 2023-02-12 RX ADMIN — SODIUM CHLORIDE, POTASSIUM CHLORIDE, SODIUM LACTATE AND CALCIUM CHLORIDE 500 ML: 600; 310; 30; 20 INJECTION, SOLUTION INTRAVENOUS at 20:54

## 2023-02-12 RX ADMIN — SODIUM CHLORIDE 500 ML: 9 INJECTION, SOLUTION INTRAVENOUS at 00:17

## 2023-02-12 RX ADMIN — PHENYLEPHRINE HYDROCHLORIDE 100 MCG: 10 INJECTION INTRAVENOUS at 02:12

## 2023-02-12 ASSESSMENT — ACTIVITIES OF DAILY LIVING (ADL)
ADLS_ACUITY_SCORE: 49
ADLS_ACUITY_SCORE: 41
ADLS_ACUITY_SCORE: 49
ADLS_ACUITY_SCORE: 41
DEPENDENT_IADLS:: CLEANING;COOKING;LAUNDRY;SHOPPING;MEAL PREPARATION;MEDICATION MANAGEMENT;TRANSPORTATION
ADLS_ACUITY_SCORE: 41
ADLS_ACUITY_SCORE: 49
ADLS_ACUITY_SCORE: 53
ADLS_ACUITY_SCORE: 41
ADLS_ACUITY_SCORE: 41
ADLS_ACUITY_SCORE: 45
ADLS_ACUITY_SCORE: 53
ADLS_ACUITY_SCORE: 49

## 2023-02-12 NOTE — DISCHARGE INSTRUCTIONS
Diet recommendations post-transplant: High protein diet x 8 weeks.  Heart healthy dietary habits long term (low saturated/trans fat, low sodium). Practice food safety precautions. See nutrition handout and food safety booklet for more information.     [facility] - home RN/PT/OT  Ph: [phone]   Fax: [phone]

## 2023-02-12 NOTE — ED NOTES
Report given to Joshua NEWBERRY on floor 6 [address]ravo bed 22. Pt is going to [facility]. Mother expressed concern of going with patient, informed the receiving nurse that she is requesting to be there since she reports her son is autistic. Informed the receiving nurse and she is following up with house supervisor. Receiving nurse number [phone]

## 2023-02-12 NOTE — H&P
[facility]    History and Physical - Medicine Service, MAROON TEAM        Date of Admission:  2/11/2023    Assessment & Plan      Dillon Salter is a 27 year old male with history of Asperger's, alcoholic cirrhosis c/b esophageal varices and hepatic encephalopathy currently undergoing liver transplant evaluation at [facility], DM II who was directly admitted from [facility] for hematemesis, acute on chronic blood loss anemia, decompensated cirrhosis and hepatic encephalopathy. Hemodynamically stable but with ongoing anu hematemesis.     Acute on chronic blood loss anemia  Hematemesis  Hx of esophageal varices   Portal hypertensive gastropathy  Recurrent rectal varices  2-3 episodes hematemesis prior to ED of ~400 mL, ~325 mL at ER, 250 mL anu hematemesis since arrival.  Hgb 9.1>8.0. HDS. INR 1.48. No reports of melena/hematochezia. Suspect Upper GIB currently, esophageal varices recently banded 1/23/23. 2 20 gauge IVs in place.   - telemetry   - consented for blood transfusion with designated HCA (mother, Leticia Salter) via telephone  - 1u pRBC now for ongoing bleeding  - NPO   - trend hgb q8h and transfuse for <7  - check fibrinogen   - IV PPI BID  - ceftriaxone 1g qday  - continue octreotide gtt  - GI consult placed and discussed with overnight fellow, Dr. Javier, plan for urgent endoscopy tonight    Decompensated cirrhosis c/b esophageal varices   Hepatic encephalopathy  Follows with Dr. Wood. MELD-Na 24 today  Etiology: Alcohol, diagnosed 4/2022  HE: lactulose/rifaximin  EV/GV: esophageal varices s/p banding 1/23/23, last EGD 1/27/23  Coagulopathy: INR 1.48,   Ascites: hold PTA bumex 1 mg and spironolactone given ASHISH  SBP:  no h/o   HCC: last CT 1/2023, no evidence of  Liver transplant candidacy: Last drink 4/2022, follows with Dr. Wood. Ongoing liver transplant eval outpatient including Chemical dependency assessment and programming/Mental health  referral, ongoing. CT angiogram, scheduled 4/2023. PFTs given history of tobacco use  - suspect decompensation secondary to GIB  - GI consulted, appreciate recs  - daily MELD labs  - continue PTA lactulose/rifaximin (can consider [place] lactulose enemas, however does have recent history of GIB secondary to rectal varices so will hold for now)  - RUQ US + doppler pending  - no evidence of ascites on my exam, pending RUQ US   - <2g Na, <2L water, >1.5 g/kg/day protein diet when diet resumes    ASHISH, mild  Hyperkalemia, mild  Suspect pre-renal - hypovolemia secondary to blood loss and poor PO intake for past days and continued diuretic use. Cr 1.18 (baseline 0.8).   - strict I/Os  - trend BMPs  - replete fluid losses with IVF and blood products   - shift K with dextrose/insulin and lasix/IVF   - hold PTA diuretics     Leukocytosis, mild  Suspect reactive in setting of UGIB. No focal exam or history otherwise. HDS.   - on ceftriaxone for UGIB for SBP ppx as above  - trend     CHRONIC & STABLE PROBLEMS    DM II - hgb A1c 5.6%  - decrease glargine to 10 units BID (20 units BID at home)  - MD sliding scale insulin, FSBG q4h  - hypoglycemia protocol    MDD/anxiety   Autism spectrum   Mother is his caretaker, lives with parents.   - continue PTA fluoxetine  - previously his mother, Leticia, has been able to stay with patient overnight which helps with mood/anxiety. As advocate for patient, I will discuss with charge RN      Diet: NPO for Medical/Clinical Reasons Except for: Meds, Ice Chips    DVT Prophylaxis: Pneumatic Compression Devices  Negron Catheter: Not present  Fluids: blood transfusion + PRN boluss  Lines: None     Cardiac Monitoring: ACTIVE order. Indication: acute blood loss anemia  Code Status: Full Code      Clinically Significant Risk Factors Present on Admission        # Hyperkalemia: Highest K = 5.5 mmol/L in last 2 days, will monitor as appropriate        # Coagulation Defect: INR = 1.48 (Ref range: 0.85 -  "1.15) and/or PTT = 39 Seconds (Ref range: 22 - 38 Seconds), will monitor for bleeding         # Overweight: Estimated body mass index is 25.79 kg/m  as calculated from the following:    Height as of an earlier encounter on 2/11/23: 1.651 m (5' 5\").    Weight as of an earlier encounter on 2/11/23: 70.3 kg (155 lb).           Disposition Plan      Expected Discharge Date: 02/13/2023                The patient's care was discussed with the Attending Physician, Dr. Colunga.    Meghan Vazquez MD  Medicine Service, [facility]  Securely message with [employer] (more info)  Text page via [facility] Paging/Directory   See signed in provider for up to date coverage information  ______________________________________________________________________    Chief Complaint   Hematemesis     History is obtained from the patient and parent. Called mother, Leticia, to get further history. Very good historian. She monitors all output (BMs included)     History of Present Illness   Dillon Salter is a 27 year old male who presents as a direct admit from [facility] ER.     -Patient has been weak for the past 3 days but taking medications regularly up until 2/11/23 (no meds except insulin on day of presentation 2/2 hematemesis and nausea, hasn't had any lactulose)  -2/10/23 PM emesis (small amount of bright red blood)  -2/11/23 emesis x2 in the afternoon ~400 mL or so after which sought EMS care    Have not noticed any blood in his stool since discharge. Hasn't been confused. Had been eating OK up until day prior. No fevers. Hasn't been complaining of anything.     Patient's only two complaints are that his mother wasn't allowed to come be with him tonight and that he prefers \"room 26\".    Does have regular gum bleeding since 4/2022 (diagnosis). Have not noticed epistaxis.     At [facility]  - S  - s/p IV ceftriaxone, PPI (80 mg IV + gtt), octreotide     Past Medical History    Past Medical " History:   Diagnosis Date     Acute on chronic anemia 04/08/2022     Alcohol use disorder      Alcoholic cirrhosis of liver with ascites (H)      Alcoholic hepatitis      Autism spectrum      Autism spectrum disorder 4/8/2022    High functioning autistic.  26-year-old history of autism/Asperger's on the spectrum graduated high school at [employer] worked at [employer] and then another  place until the Covid epidemic in 2020 lives with parents (Leticia and Wes) socially isolated drinks alcohol beer daily      Benign essential hypertension      Bleeding esophageal varices (H) 6/18/2022     Hepatic encephalopathy      Hyponatremia 7/28/2022     Major depressive disorder      Type II Diabetes        Past Surgical History   Past Surgical History:   Procedure Laterality Date     COLONOSCOPY N/A 1/27/2023    Procedure: Colonoscopy;  Surgeon: Osman Montoya MD;  Location: [facility] OR     ENDOSCOPIC ULTRASOUND LOWER GASTROINTESTIONAL TRACT (GI) N/A 1/27/2023    Procedure: ULTRASOUND, LOWER GI TRACT, ENDOSCOPIC with glueing;  Surgeon: Osman Montoya MD;  Location: [facility] OR     ESOPHAGOSCOPY, GASTROSCOPY, DUODENOSCOPY (EGD), COMBINED N/A 5/24/2022    Procedure: ESOPHAGOGASTRODUODENOSCOPY (EGD) with esophageal banding;  Surgeon: Gary Hurst MD;  Location: [facility] Main OR     ESOPHAGOSCOPY, GASTROSCOPY, DUODENOSCOPY (EGD), COMBINED N/A 6/20/2022    Procedure: ESOPHAGOGASTRODUODENOSCOPY;  Surgeon: Gavino Reza MD;  [facility]: [facility] Main OR     ESOPHAGOSCOPY, GASTROSCOPY, DUODENOSCOPY (EGD), COMBINED N/A 1/23/2023    Procedure: ESOPHAGOGASTRODUODENOSCOPY (EGD) with esophageal variceal banding;  Surgeon: Juan Boo MD;  Location: [facility] Main OR     ESOPHAGOSCOPY, GASTROSCOPY, DUODENOSCOPY (EGD), COMBINED N/A 1/25/2023    Procedure: ESOPHAGOGASTRODUODENOSCOPY (EGD);  Surgeon: Juan Boo MD;  Location: [facility] Main OR     ESOPHAGOSCOPY, GASTROSCOPY, DUODENOSCOPY (EGD), COMBINED N/A 1/27/2023     Procedure: Esophagoscopy, gastroscopy, duodenoscopy (EGD), combined;  Surgeon: Osman Montoya MD;  Location: [facility] OR     MIDLINE DOUBLE LUMEN PLACEMENT  2022          PICC TRIPLE LUMEN PLACEMENT  2022            Prior to Admission Medications   Prior to Admission Medications   Prescriptions Last Dose Informant Patient Reported? Taking?   FLUoxetine (PROZAC) 20 MG capsule   Yes No   Sig: Take 60 mg by mouth At Bedtime   acetaminophen (TYLENOL) 500 MG tablet   Yes No   Sig: Take 500 mg by mouth daily as needed for pain   bumetanide (BUMEX) 1 MG tablet   No No   Sig: Take 1 tablet (1 mg) by mouth daily   folic acid (FOLVITE) 1 MG tablet   No No   Sig: Take 1 tablet (1 mg) by mouth daily   gabapentin (NEURONTIN) 100 MG capsule   Yes No   Sig: Take 100 mg by mouth daily as needed   ibuprofen (ADVIL/MOTRIN) 200 MG tablet   Yes No   Sig: Take 400 mg by mouth daily as needed for pain   insulin aspart (NOVOLOG FLEXPEN) 100 UNIT/ML pen   No No   Si unit per 10 grams of carb, plus additional units based on following scale   For Pre-Meal  - 164 give 1 unit. For Pre-Meal  - 189 give 2 units. For Pre-Meal  - 214 give 3 units. For Pre-Meal  - 239 give 4 units. For Pre-Meal  - 264 give 5 units. For Pre-Meal  - 289 give 6 units. For Pre-Meal  - 314 give 7 units. For Pre-Meal  - 339 give 8 units. For Pre-Meal  - 364 give 9 units.  For Pre-Meal BG greater than or equal to 365 give 10 units   Patient taking differently: 1-10 Units 1 unit per 10 grams of carb, plus additional units based on following scale   For Pre-Meal  - 164 give 1 unit. For Pre-Meal  - 189 give 2 units. For Pre-Meal  - 214 give 3 units. For Pre-Meal  - 239 give 4 units. For Pre-Meal  - 264 give 5 units. For Pre-Meal  - 289 give 6 units. For Pre-Meal  - 314 give 7 units. For Pre-Meal  - 339 give 8 units. For Pre-Meal  - 364 give 9 units.  For  Pre-Meal BG greater than or equal to 365 give 10 units   insulin glargine (LANTUS PEN) 100 UNIT/ML pen   No No   Sig: Inject 20 Units Subcutaneous 2 times daily   lactulose (CEPHULAC) 10 GM packet   No No   Sig: Take 2 packets (20 g) by mouth 3 times daily   multivitamin, therapeutic (THERA-VIT) TABS tablet   No No   Sig: Take 1 tablet by mouth in the morning.   pantoprazole (PROTONIX) 40 MG EC tablet   No No   Sig: Take 1 tablet (40 mg) by mouth daily   rifaximin (XIFAXAN) 550 MG TABS tablet   No No   Sig: Take 1 tablet (550 mg) by mouth 2 times daily   spironolactone (ALDACTONE) 100 MG tablet   Yes No   Sig: Take 100 mg by mouth At Bedtime   thiamine (B-1) 100 MG tablet   No No   Sig: Take 1 tablet (100 mg) by mouth in the morning.      Facility-Administered Medications: None        Review of Systems    The 10 point Review of Systems is negative other than noted in the HPI or here.     Social History   I have reviewed this patient's social history and updated it with pertinent information if needed.  Social History     Tobacco Use     Smoking status: Former     Smokeless tobacco: Never     Tobacco comments:     A pack lasted a year, hardly smoked   Vaping Use     Vaping Use: Former   Substance Use Topics     Alcohol use: Not Currently     Comment: No ETOH since 4/6/22     Drug use: Not Currently     Types: Marijuana       Family History   I have reviewed this patient's family history and updated it with pertinent information if needed.  Family History   Problem Relation Age of Onset     Hypertension Mother      Substance Abuse Mother      Thyroid Disease Mother      Heart Failure Father      Substance Abuse Father      Chronic Obstructive Pulmonary Disease Father      Substance Abuse Maternal Grandfather        Allergies   No Known Allergies     Physical Exam   Vital Signs: Temp: 97.3  F (36.3  C) Temp src: Oral BP: 132/69 Pulse: 89     SpO2: 100 % O2 Device: None (Room air)    Weight: 0 lbs 0  oz    Constitutional:  Very polite, mildly jaundice male, alert and no distress, appears stated age  HEENT: Normocephalic. PERRLA. Active bleeding from gums and small laceration in lip with blood pooling in the mouth and oropharynx. Red dried blood around both nostrils and mouth   Cardiovascular: RRR. Systolic murmur, no clicks gallops or rubs  Respiratory: Good diaphragmatic excursion. Lungs clear bilaterally, non-labored conversational breating  Gastrointestinal: Abdomen obese, soft, non-tender. BS normal. 250 mL of bright red blood in suction cannister from patient's nose/mouth  Musculoskeletal: decreased muscle tone  Skin: jaundice  Neurologic: AxO x2 (self and place, thought it was 1/29/2022). Following some commands but forgetful and repetitive conversation. Moving all extremities bilaterally. Asterixis bilaterally    Data     I have personally reviewed the following data over the past 24 hrs:    11.1 (H)  \   8.0 (L)   / 171     133 (L) 96 (L) 33 (H) /  263 (H)   5.7 (H) 27 1.18 \       ALT: 54 (H) AST: 71 (H) AP: 157 (H) TBILI: 10.7 (H)   ALB: 3.6 TOT PROTEIN: 8.4 (H) LIPASE: <9       Procal: N/A CRP: N/A Lactic Acid: 1.7       INR:  1.48 (H) PTT:  39 (H)   D-dimer:  N/A Fibrinogen:  N/A       Imaging results reviewed over the past 24 hrs:   No results found for this or any previous visit (from the past 24 hour(s)).

## 2023-02-12 NOTE — CONSULTS
HEPATOLOGY CONSULTATION      Date of Admission:  2/11/2023          ASSESSMENT AND RECOMMENDATIONS:   Dillon Salter is a 27 year old male with a medical history of Asperger's, T2DM, alcohol related cirrhosis c/b hepatic encephalopathy, history of esophageal varices bleeding (5/2022, 1/2023) and rectal varices s/p sclerotherapy 1/2023, ascites presenting with multiple episodes of hematemesis concerning for upper GI bleed.     Acute upper GI bleed  Presented with multiple episodes of hematemesis. DDx includes esophageal variceal bleeding, post banding ulcer, PUD, portal hypertensive gastropathy. EGD 1/23/23 with grade 2 esophageal varices banded x3; repeat EGD 1/28/23 showed nonbleeding ulceration at banding site. Currently hemodynamically stable but concern given multiple episodes of blood loss.     Decompensated cirrhosis with hepatic encephalopathy, h/o esophageal variceal bleed, HE  MELD-Na: 24  Etiology: Alcohol  EV: esophageal varices s/p banded 1/23/22  Ascites: PTA bumex 1 mg daily and spironolactone 100 mg daily   - no prior SBP  HE: mild HE and notable asterixis on admission. PTA lactulose and rifaxamin    HCC screen: 1/2023 without concerning masses   Liver transplant candidacy: Last drink 4/2022, follows with Dr. Wood.  Ongoing liver transplant eval outpatient    RECOMMENDATIONS  - Plan for upper endoscopy in OR for airway protection in setting of hematemesis.   - NPO  - Please shift potassium to help improve hyperkalemia prior to procedure   - shifting with insulin and glucose & given lasix   - IV pantoprazole 40mg BID  - Octreotide infusion 50 mcg/hr  - Ceftriaxone IV 1g daily for infectious prophylaxis given cirrhotic with GI bleed.   - Ordered metoclopramide 10mg IV x1 to promote gastric emptying and facilitate visualization   - Maintain 2 large bore IVs at all times (18-20g)  - Primary team to trend Hgb. Transfuse for Hgb <7 or active bleeding. Keep type and screen up to date.   - Hold  anticoagulation in setting of acute GI bleed  - RUQ ultrasound with dopplers in AM  - Continue PTA rifaximin  - titrate lactulose to 3-4 loose bowel movements daily   - if ascites present, please obtain diagnostic paracentesis       Thank you for involving us in this patient's care. Please do not hesitate to contact the GI service with any questions or concerns.     Patient care plan discussed with Dr. Leventhal, GI staff physician.    Pablo Javier MD  Gastroenterology Fellow - PGY4  [facility]                 Chief Complaint:   We were asked by  Dr. Vazquez to evaluate this patient with 27M with EtOH Cirrhois c/b esophageal varices presenting with decompensated cirrhosis due to hematemesis/GIB    History is obtained from the patient and the medical record.          History of Present Illness:   Dillon Salter is a 27 year old male with a medical history of Asperger's, T2DM, alcohol related cirrhosis c/b hepatic encephalopathy, history of esophageal varices bleeding (5/2022, 1/2023), ascites presenting with multiple episodes of hematemesis.     Patient last admitted 1/21-1/29/23 for lower GI bleed due to rectal varices. He has lower EUS done with showed nonbleeding rectal varices without stigmata of recent hemorrhage. These were treated with sclerotherapy. EGD 1/28 showed recently banded esophageal varices which were not bleeding. Normal stomach and duodenum.   EGD 1/25/23 showed active bleeding from gums with blood pooling in mouth. nonbleeding grade 1 EV.   EGD 1/23/23 with grade II EV with stigmata of recent bleeding which were banded.     Now presenting to OSH with multiple episodes of hematemesis. While at outside ED he had two episodes of hematemesis (150cc and 175cc). Episodes have been bright red blood. Also having weakness and lightheadedness. Shortly after arrival [facility] at [facility] he had an additional 200cc of anu bloody emesis. He denies feeling nausea, no abdominal pain. He has been feeling  well recently. No abdominal distension. Mother notes she keeps close track of stool and has not see any BRBPR or melena.     On arrival here he is afebrile, SBP in 130-140s, HR in 90s. Hgb 8.0 from 9.1 on check earlier today. Recent baseline has been ~8.0. INR 1.48. Na 133, K 5.5 --> 5.7, Cr 1.18 with BUN 33, ALT 54, AST 71, total bilirubin 10.7 .           Past Medical History:   Reviewed and edited as appropriate  Past Medical History:   Diagnosis Date     Acute on chronic anemia 04/08/2022     Alcohol use disorder      Alcoholic cirrhosis of liver with ascites (H)      Alcoholic hepatitis      Autism spectrum      Autism spectrum disorder 4/8/2022    High functioning autistic.  26-year-old history of autism/Asperger's on the spectrum graduated high school at [employer] worked at [employer] and then another  place until the Covid epidemic in 2020 lives with parents (Leticia and Wes) socially isolated drinks alcohol beer daily      Benign essential hypertension      Bleeding esophageal varices (H) 6/18/2022     Hepatic encephalopathy      Hyponatremia 7/28/2022     Major depressive disorder      Type II Diabetes             Past Surgical History:   Reviewed and edited as appropriate   Past Surgical History:   Procedure Laterality Date     COLONOSCOPY N/A 1/27/2023    Procedure: Colonoscopy;  Surgeon: Osman Montoya MD;  Location: [facility] OR     ENDOSCOPIC ULTRASOUND LOWER GASTROINTESTIONAL TRACT (GI) N/A 1/27/2023    Procedure: ULTRASOUND, LOWER GI TRACT, ENDOSCOPIC with glueing;  Surgeon: Osman Montoya MD;  Location: [facility] OR     ESOPHAGOSCOPY, GASTROSCOPY, DUODENOSCOPY (EGD), COMBINED N/A 5/24/2022    Procedure: ESOPHAGOGASTRODUODENOSCOPY (EGD) with esophageal banding;  Surgeon: Gary Hurst MD;  Location: [facility] OR     ESOPHAGOSCOPY, GASTROSCOPY, DUODENOSCOPY (EGD), COMBINED N/A 6/20/2022    Procedure: ESOPHAGOGASTRODUODENOSCOPY;  Surgeon: Gavino Reza MD;  Location: [facility] OR      ESOPHAGOSCOPY, GASTROSCOPY, DUODENOSCOPY (EGD), COMBINED N/A 1/23/2023    Procedure: ESOPHAGOGASTRODUODENOSCOPY (EGD) with esophageal variceal banding;  Surgeon: Juan Boo MD;  Location: [facility] Main OR     ESOPHAGOSCOPY, GASTROSCOPY, DUODENOSCOPY (EGD), COMBINED N/A 1/25/2023    Procedure: ESOPHAGOGASTRODUODENOSCOPY (EGD);  Surgeon: Juan Boo MD;  Location: [facility] Main OR     ESOPHAGOSCOPY, GASTROSCOPY, DUODENOSCOPY (EGD), COMBINED N/A 1/27/2023    Procedure: Esophagoscopy, gastroscopy, duodenoscopy (EGD), combined;  Surgeon: Osman Montoya MD;  Location: [facility] OR     MIDLINE DOUBLE LUMEN PLACEMENT  5/26/2022          PICC TRIPLE LUMEN PLACEMENT  7/28/2022                 Social History:   Lives with mother and father. Previous smoker Previous heavy EtOH use, none since 4/2022         Family History:   Noncontributory          Allergies:   Reviewed and edited as appropriate   No Known Allergies         Medications:   Bumetanide, fluoxetine, folic acid, gabapentin, insulin. Lactulose, pantoprazole, rifaximin, spironolactone, thiamine          Review of Systems:     A complete review of systems was performed and is negative except as noted in the HPI           Physical Exam:   /69 (BP Location: Right arm)   Pulse 89   Temp 97.3  F (36.3  C) (Oral)   SpO2 100%   Wt:   Wt Readings from Last 2 Encounters:   02/11/23 70.3 kg (155 lb)   01/29/23 74.5 kg (164 lb 3.9 oz)      Constitutional: no acute distress, pleasant and conversational, tangential but redirectable. Emotional at times.   Eyes: Scleral icterus  Ears/nose/mouth/throat: dried blood around mouth and nares. Some fresh blood in gums.   CV: RRR, no murmur  Respiratory: CTAB, no wheezes or crackles.   Abdomen:  Mildly distended, +bs, nontender, no peritoneal signs  Skin: warm, perfused, + jaundice. Scattered bruising on forearms.   Neuro: alert and oriented to person and place. Moving all extremities equally. Fluent speech. Asterixis  present   Psych: tearful at times during visit talking about his family  MSK: In bed         Data:   Labs and imaging below were independently reviewed and interpreted    BMP  Recent Labs   Lab 02/11/23 2335 02/11/23 2259 02/11/23 1852   NA  --   --  133*   POTASSIUM  --  5.7* 5.5*   CHLORIDE  --   --  96*   SARTHAK  --   --  9.9   CO2  --   --  27   BUN  --   --  33*   CR  --   --  1.18   *  --  216*     CBC  Recent Labs   Lab 02/11/23 2259 02/11/23 1852   WBC  --  11.1*   RBC  --  2.68*   HGB 8.0* 9.1*   HCT  --  27.4*   MCV  --  102*   MCH  --  34.0*   MCHC  --  33.2   RDW  --  17.0*   PLT  --  171     INR  Recent Labs   Lab 02/11/23 1852   INR 1.48*     LFTs  Recent Labs   Lab 02/11/23 1852   ALKPHOS 157*   AST 71*   ALT 54*   BILITOTAL 10.7*   PROTTOTAL 8.4*   ALBUMIN 3.6      PANC  Recent Labs   Lab 02/11/23 1852   LIPASE <9       Imaging: no new imaging

## 2023-02-12 NOTE — PROGRESS NOTES
HEPATOLOGY PROGRESS NOTE    Date of Admission: 2/11/2023  Reason for Admission: hematemesis       ASSESSMENT:  Dillon Salter is a 27 year old male with a medical history of Asperger's, T2DM, alcohol related cirrhosis c/b hepatic encephalopathy, history of esophageal varices bleeding (5/2022, 1/2023) and rectal varices s/p sclerotherapy 1/2023, ascites presenting with multiple episodes of hematemesis concerning for upper GI bleed.      Acute upper GI bleed  Presented with multiple episodes of hematemesis. DDx includes esophageal variceal bleeding, post banding ulcer, PUD, portal hypertensive gastropathy. EGD 1/23/23 with grade 2 esophageal varices banded x3; repeat EGD 1/28/23 showed nonbleeding ulceration at banding site. Underwent EGD this admission 2/12/23 with two superficial, nonbleeding esophageal ulcers in distal esophagus, small nonbleeding varices, and severe diffuse portal HTN gastropathy most prominent with oozing in the fundus. No overt targets for hemostasis during EGD. Last hematemesis episode was prior to EGD.     Encephalopathy   Mild HE on admission but became more lethargic after anesthesia. Suspect multiple exacerbating factors to HE including GIB, anesthesia, electrolyte abnormalities (hyperK), and lactulose deficiency. Team working on enteral access for lactulose administration.       Decompensated cirrhosis with hepatic encephalopathy, h/o esophageal variceal bleed, HE  MELD-Na: 26, 2/12/23  Etiology: Alcohol  EV: esophageal varices s/p banded 1/23/22; Repeat EGD 2/12/23 with small (<5mm) varices  Ascites: PTA bumex 1 mg daily and spironolactone 100 mg daily                - no prior SBP      - no ascites on US  HE: mild HE and notable asterixis on admission. PTA lactulose and rifaxamin    HCC screen: 1/2023 without concerning masses   Liver transplant candidacy: Last drink 4/2022, follows with Dr. Wood.  Ongoing liver transplant eval outpatient     RECOMMENDATIONS  - Aggressive treatment  of hepatic encephalopathy with lactulose via NG given inability to safely swallow and rifaximin  - Correct electrolytes   - hold spironolactone in setting of hyperkalemia   - hold PTA bumex in setting of ASHISH  - Continue octreotide infusion and ceftriaxone   - continue IV PPI BID   - Maintain 2 large bore IVs at all times (18-20g)  - Primary team to trend Hgb. Transfuse for Hgb <7 or active bleeding. Keep type and screen up to date.   - Hold anticoagulation in setting of acute GI bleed  - Repeat PEth lab in process        The patient was discussed and plan agreed upon with GI staff, Dr. Leventhal Morgan Freeman, MD  Gastroenterology Fellow - PGY4  [facility]     _______________________________________________________________      Subjective:   Patient underwent EGD overnight without active bleeding or need for intervention. This AM he was more encephalopathic and found to have potassium of 6.3 Unable to safely swallow pills. No fever or chills. No respiratory changes.     ROS:   4 pt ROS negative unless noted in subjective.     Objective:  Blood pressure 110/58, pulse 78, temperature 98.3  F (36.8  C), resp. rate 16, weight 65.9 kg (145 lb 4.5 oz), SpO2 98 %.  Gen: lethargic, lying in bed.   HEENT: +scleral icterus   CV: RRR   Resp: CTAB  Abd: Soft, NT, ND, no guarding or rebound, +BS   Skin:  + jaundice  Ext: warm, well perfused, no edema.   Neuro: lethargic, will open eyes to stimulation but does not answer questions and closes eyes quickly. +asteristix     LABS:  BMP  Recent Labs   Lab 02/12/23  1357 02/12/23  1204 02/12/23  1105 02/12/23  1045 02/12/23  0937 02/12/23  0839 02/12/23  0811 02/12/23  0541 02/12/23  0503 02/12/23  0219 02/11/23  2259 02/11/23  1852   NA  --   --   --   --   --   --   --  131*  --  135  --  133*   POTASSIUM 5.4*  --  5.4*  --   --  6.2* 6.3* 5.9*  --  4.3   < > 5.5*   CHLORIDE  --   --   --   --   --   --   --  97*  --   --   --  96*   SARTHAK  --   --   --   --   --   --    --  9.3  --   --   --  9.9   CO2  --   --   --   --   --   --   --  22  --   --   --  27   BUN  --   --   --   --   --   --   --  37.3*  --   --   --  33*   CR  --   --   --   --   --   --   --  1.22*  --   --   --  1.18   GLC  --  392*  --  351* 350*  --   --  261*   < > 186*   < > 216*    < > = values in this interval not displayed.     CBC  Recent Labs   Lab 02/12/23  1103 02/12/23  0541 02/11/23 2259 02/11/23 1852   WBC  --  7.9  --  11.1*   RBC  --  2.26*  --  2.68*   HGB 6.9* 7.6*   < > 9.1*   HCT  --  23.2*  --  27.4*   MCV  --  103*  --  102*   MCH  --  33.6*  --  34.0*   MCHC  --  32.8  --  33.2   RDW  --  16.9*  --  17.0*   PLT  --  132*  --  171    < > = values in this interval not displayed.     INR  Recent Labs   Lab 02/12/23  0541 02/11/23 1852   INR 1.55* 1.48*     LFTs  Recent Labs   Lab 02/12/23  0541 02/11/23 1852   ALKPHOS 113 157*   AST 74* 71*   ALT 42 54*   BILITOTAL 8.5* 10.7*   PROTTOTAL 6.9 8.4*   ALBUMIN 3.4* 3.6      PANC  Recent Labs   Lab 02/11/23 1852   LIPASE <9

## 2023-02-12 NOTE — PROGRESS NOTES
[facility]    Progress Note - Medicine Service, [facility] TEAM 2       Date of Admission:  2/11/2023    Assessment & Plan   Dillon Salter is a 27 year old male with history of Asperger's, alcoholic cirrhosis c/b esophageal varices and hepatic encephalopathy currently undergoing liver transplant evaluation at [facility], DM II who was directly admitted from [facility] for hematemesis, acute on chronic blood loss anemia, decompensated cirrhosis and hepatic encephalopathy. Hemodynamically stable but with worsening hepatic encephalopathy.    Changes Today:  - Urgent EGD overnight: diffuse oozing, no active bleeding from banded esophageal varice seen  - Somnolent today; NG placement at bedside for lactulose administration via [facility] protocol  - GI/Hepatology following; appreciate recs  - Hgb down to 6.9, transfused 1u pRBC  - Hyperkalemia, shifted x1 overnight and again this morning  - Trend lactate     Acute on chronic blood loss anemia  Hematemesis  Hx of esophageal varices   Portal hypertensive gastropathy  Esophageal ulceration  Recurrent rectal varices  2-3 episodes hematemesis prior to ED of ~400 mL, ~325 mL at ER, 250 mL anu hematemesis since arrival.  Hgb 9.1>8.0. HDS. INR 1.48. No reports of melena/hematochezia. Suspect Upper GIB currently, esophageal varices recently banded 1/23/23. 2 20 gauge IVs in place.   - telemetry   - consented for blood transfusion with designated HCA (mother, Leticia Salter) via telephone  - NPO   - trend hgb q6h and transfuse for <7  - IV PPI BID  - ceftriaxone 1g qday  - continue octreotide gtt  - GI following  - s/p EGD 2/12 AM: diffuse oozing likely 2/2 portal hypertensive gastropathy, no bleeding seen from recent banding, nonbleeding esophageal ulcers seen    Hepatic encephalopathy, worsened  Decompensated alcohol related cirrhosis c/b esophageal varices   Portal HTN and splenomegaly  Coagulopathy due to liver  disease  Thrombocytopenia due to liver disease, present on admission  Follows with Dr. Wood. MELD-Na 24 today  Etiology: Alcohol, diagnosed 4/2022  HE: lactulose via NG per [place] protocol (holding PTA scheduled currently), continue rifaximin  EV/GV: esophageal varices s/p banding 1/23/23, stable on EGD 2/12 without evidence of bleeding; start carvedilol BID when able  Coagulopathy: INR 1.48,  (suspect 171 on admission was hemoconcentration)  Ascites: None per RUQ 2/12, hold PTA bumex 1 mg and spironolactone given ASHISH  SBP:  no h/o   HCC: last CT 1/2023, no evidence of  Liver transplant candidacy: Last drink 4/2022, follows with Dr. Wood. Ongoing liver transplant eval outpatient including Chemical dependency assessment and programming/Mental health referral, ongoing. CT angiogram, scheduled 4/2023. PFTs given history of tobacco use  - suspect decompensation secondary to GIB  - GI consulted, appreciate recs  - daily MELD labs  - GI ok with NG placement for lactulose administration, per [place] protocol  - RUQ US + doppler w/o ascites seen  - no evidence of ascites on my exam, pending RUQ US   - <2g Na, <2L water, >1.5 g/kg/day protein diet when diet resumes  - Follow-up blood cultures x2 2/11  - place NG to lactulose administration at beside    Lactic acidosis  Lactate 2.8 on admission. Likely 2/2 hypovolemia in the setting of acute GIB.  - Given 500 ml w/ K shifting this morning, then 1u pRBC  - Repeat/trend, give additional fluids/blood as needed    Severe hyperkalemia  K 5.9 -> 6.3 -> 6.2.  Shifted x1 overnight without significant improvement.  - Shift again: insulin/D50, sodium bicarb, and lasix 40 mg IV x1.  - Lactulose to aid with BM's, as noted above  - If not responsive to further shifting, then d/w renal     ASHISH  Hyponatremia  Hyperphosphatemia  Hypomagnesemia  Suspect pre-renal - hypovolemia secondary to blood loss and poor PO intake for past days and continued diuretic use. Cr 1.22 (baseline  0.8).   - strict I/Os  - trend BMPs  - replete fluid losses with IVF and blood products   - holding PTA diuretics      Leukocytosis, resolved  Suspect reactive in setting of UGIB. No focal exam or history otherwise. HDS.   - on ceftriaxone for UGIB for SBP ppx as above  - trend   - Follow-up blood cultures x2 2/11     CHRONIC & STABLE PROBLEMS     Hyperglycemia  DM II - hgb A1c 5.6%  - decreased glargine to 10 units BID (20 units BID at home)  - MD sliding scale insulin, FSBG q4h  - hypoglycemia protocol     MDD/anxiety   Autism spectrum   Mother is his caretaker, lives with parents.   - continue PTA fluoxetine  - previously his mother, Leticia, has been able to stay with patient overnight which helps with mood/anxiety. This was discussed with charge RN    Malnutrition:    - Level of malnutrition: Severe   - nutrition plan to be clarified once able to tolerate PO          Diet: NPO for Medical/Clinical Reasons Except for: Meds, Ice Chips    DVT Prophylaxis: Pneumatic Compression Devices  Negron Catheter: Not present  Fluids: blood transfusion + PRN boluss  Lines: None     Cardiac Monitoring: ACTIVE order. Indication: acute blood loss anemia  Code Status: Full Code       Diet: NPO for Medical/Clinical Reasons Except for: Meds, Ice Chips    DVT Prophylaxis: Contraindicated 2/2 active GIB  Negron Catheter: Not present  Fluids: s/p 500 ml bolus 2/12 AM  Lines: None     Cardiac Monitoring: ACTIVE order. Indication: Electrolyte Imbalance (24 hours)- Magnesium <1.3 mg/ml; Potassium < =2.8 or > 5.5 mg/ml  Code Status: Full Code      Clinically Significant Risk Factors Present on Admission        # Hyperkalemia: Highest K = 6.3 mmol/L in last 2 days, will monitor as appropriate     # Hypomagnesemia: Lowest Mg = 1.6 mg/dL in last 2 days, will replace as needed   # Hypoalbuminemia: Lowest albumin = 3.4 g/dL at 2/12/2023  5:41 AM, will monitor as appropriate  # Coagulation Defect: INR = 1.55 (Ref range: 0.85 - 1.15) and/or PTT = 39  Seconds (Ref range: 22 - 38 Seconds), will monitor for bleeding   # Acute Kidney Injury, unspecified: based on a >150% or 0.3 mg/dL increase in last creatinine compared to past 90 day average, will monitor renal function               Disposition Plan      Expected Discharge Date: 02/13/2023                The patient's care was discussed with the Attending Physician, Dr. Zapien.    Radha Granado MD  Medicine Service, [address]  Securely message with Vocera (more info)  Text page via [facility] Paging/[employer]   See signed in provider for up to date coverage information         Physician Attestation   I saw this patient with the resident and agree with the resident/fellow's findings and plan of care as documented in the note.  I have made my own additions and corrections to this note    55 MINUTES SPENT BY ME on the date of service doing chart review, history, exam, documentation & further activities per the note.    Serjio Zapien MD  Date of Service (when I saw the patient): 02/12/23  ______________________________________________________________________    Interval History   Admitted overnight. Underwent emergent EGD given hematemesis. No further hematemesis since EGD per RN. However, pt very somnolent post EGD. Not improving. Alert to painful stimuli, only intermittently following commands. Overdue for lactulose, unable to give orally given mental status per RN.    Physical Exam   Vital Signs: Temp: 98.9  F (37.2  C) Temp src: Axillary BP: 123/66 Pulse: 104   Resp: 14 SpO2: 97 % O2 Device: None (Room air)    Weight: 145 lbs 4.53 oz    General Appearance: Ill-appearing, somnolent, responsive only to painful or irritating stimuli  Respiratory: CTAB, no wheezing, no increased WOB, ETCO2 monitor in place  Cardiovascular: Tachycardic, regular rhythm, no murmur, extremities warm and well perfused  GI: Soft, nontender, no fluid wave  Skin: No rash, some  bruising on the lower extremities  Neuro: Somnolent, no opening eyes to command but squeezes hand lightly, moves/moans to irritating/painful stimuli    Medical Decision Making       Please see A&P for additional details of medical decision making.      Data     I have personally reviewed the following data over the past 24 hrs:    7.9  \   6.9 (LL)   / 132 (L)     131 (L) 97 (L) 37.3 (H) /  392 (H)   5.4 (H) 22 1.22 (H) \       ALT: 42 AST: 74 (H) AP: 113 TBILI: 8.5 (H)   ALB: 3.4 (L) TOT PROTEIN: 6.9 LIPASE: <9       Procal: N/A CRP: N/A Lactic Acid: 2.8 (H)       INR:  1.55 (H) PTT:  41 (H)   D-dimer:  N/A Fibrinogen:  244       Imaging results reviewed over the past 24 hrs:   Recent Results (from the past 24 hour(s))   US Abdomen Complete w Doppler Complete    Narrative    EXAMINATION: US ABDOMEN COMPLETE WITH DOPPLER COMPLETE 2/12/2023 8:10  AM     COMPARISON: CT abdomen and pelvis 1/25/2023. Abdominal ultrasound  1/24/2023.    HISTORY: Decompensated cirrhosis, evaluate for portal venous  thrombosis and ascites.    TECHNIQUE: The abdomen was scanned in standard fashion with  specialized ultrasound transducer(s) using both gray-scale, color  Doppler, and spectral flow techniques.    Findings:  Liver: The liver demonstrates diffusely increased echogenicity of the  liver parenchyma measuring up to 18.6 cm. No evidence of a focal  hepatic mass.     Extrahepatic portal vein flow is antegrade at 35 cm/s.  Right portal vein flow is antegrade, measuring 19 cm/s.  Left portal vein flow is antegrade, measuring 32 cm/s.    Flow in the hepatic artery is towards the liver and:  94 cm/s peak systolic  0.56 resistive index.     The splenic vein is patent and flow is towards the liver.  The left,  middle, and right hepatic veins are patent with flow towards the IVC.  The IVC is patent with flow towards the heart.   The visualized aorta  is not dilated.    Gallbladder: Trace cholelithiasis versus sludge layering within  the  gallbladder lumen. There is no wall thickening, pericholecystic fluid,  or positive sonographic Vanessa's sign.    Bile Ducts: Both the intra- and extrahepatic biliary system are of  normal caliber.  The common bile duct measures 5.    Pancreas: Visualized portions of the head and body of the pancreas are  unremarkable.     Kidneys: Normal echotexture of the right kidney, without mass or  hydronephrosis. Limited assessment of the left kidney.   Renal  lengths: right- 9.3 cm.    Spleen: The spleen measures 15.9 cm in length.    Fluid: No evidence of ascites or pleural effusions.      Impression    Impression:   1.  Patent Doppler evaluation of the hepatic vasculature.  2.   Cirrhotic morphology of the liver. Hepatomegaly.  3. Layering cholelithiasis/sludge within the gallbladder lumen. No  sonographic findings to suggest acute cholecystitis.  4. No sonographic evidence of ascites.    I have personally reviewed the examination and initial interpretation  and I agree with the findings.    DEMETRICE BLACK MD         SYSTEM ID:  [id number]     EGD 2/12/23:  Impression:            - Esophageal ulcers with no bleeding and no stigmata                          of recent bleeding.                          - Small (< 5 mm) esophageal varices.                          - Portal hypertensive gastropathy.                          - Normal examined duodenum.                          - Possible that he bled from post-banding ulcers, but                          no stigmata to make this highly likely. Diffuse oozing                          noted from portal hypertensive gastropathy was more                          likely source

## 2023-02-12 NOTE — ED TRIAGE NOTES
Arrives to ED with c/o hematemesis beginning last night. Reports cup of anu emesis last night. Hematemesis x2 today. Anu and then dark red. First episode 3 cups, second episode 1.5 cups. Pt jaundice. Plan for liver transplant. discharge from [facility] on 1/30. Pt denies pain. Endorses weakness and fatigue.      Triage Assessment     Row Name 02/11/23 [id number]       Triage Assessment (Adult)    Airway WDL WDL       Respiratory WDL    Respiratory WDL WDL       Skin Circulation/Temperature WDL    Skin Circulation/Temperature WDL X  jaundice       Cardiac WDL    Cardiac WDL WDL       Peripheral/Neurovascular WDL    Peripheral Neurovascular WDL WDL       Cognitive/Neuro/Behavioral WDL    Cognitive/Neuro/Behavioral WDL WDL

## 2023-02-12 NOTE — PROGRESS NOTES
Pt admitted to the unit at 2250. Two RN skin assessment completed with  and Ina NEWBERRY. Redness to inguinal fold, hyperpigmented shins, and scattered bruises. Pt also has a cracked lower lip that is bleeding.

## 2023-02-12 NOTE — OP NOTE
Gastroenterology Endoscopy Suite Brief Operative Note    Procedure:  Upper endoscopy   Post-operative diagnosis:  Nonbleeding esophageal ulcer  Portal hypertensive gastropathy with oozing in fundus    Staff Physician:  Dr. Leventhal   Fellow/Assistant(s):  Pablo Javier MD    Specimens:  Please see final procedure note for further details.   Findings:  Nonbleeding esophogeal ulcer in the distal esophogeal- likely site of previous banding. Diffuse portal hypertensive gastropathy with oozing in the fundus. No intervention indicated.    Complications:  None.   Condition:  Stable   Recommendations  Diet:  NPO overnight.   PPI:  PPI IV BID  Anti-coagulants/platelets:  Hold anticoagulation  Octreotide:  Continue octreotide drip  Discharge Planning:   - return patient to [facility] for ongoing care   - hepatology team to see again in the AM

## 2023-02-12 NOTE — ED PROVIDER NOTES
EMERGENCY DEPARTMENT ENCOUNTER      NAME: Dillon Salter  AGE: 27 year old male  YOB: 1995  MRN: [id number]  EVALUATION DATE & TIME: 2023  6:26 PM    PCP: Gary Rodrigues    ED PROVIDER: Eloise Forbes MD    Chief Complaint   Patient presents with     Hematemesis         FINAL IMPRESSION:  1. UGIB (upper gastrointestinal bleed)    2. Alcoholic cirrhosis of liver with ascites (H)    3. Hyperammonemia (H)    4. Anemia, unspecified type    5. Coagulopathy (H)    6. Hyperkalemia    7. Hyperbilirubinemia          ED COURSE & MEDICAL DECISION MAKIN:35 PM I met with patient for initial interview and encounter. PPE worn includes surgical mask.  6:50 PM I spoke to Dr. Virgen with GI regarding plan for care.  7:45 PM I rechecked patient.  7:59 PM I spoke to [facility] hospitalist Dr. Reardon regarding plan for transfer.    Pertinent Labs & Imaging studies reviewed. (See chart for details)  27 year old male with history of alcoholic cirrhosis with recent GI bleed related to esophageal varices, HTN, DM on liver transplant evaluation list who presents to the Emergency Department for evaluation of hematemesis.  Episode yesterday, several episodes today.  Hematemesis here at time of initial evaluation of approximately 150 mL of blood.  Symptoms are highly concerning for recurrent variceal bleeding.  Per chart review of recent hospital admission patient had variceal banding and was ultimately transferred to the  where he was seen by GI.  In the GI notes they talk about a potential TIPS procedure, but did not proceed with this and is not well documented in the notes as to why not.  Patient would likely benefit from transfer to the  again for transplant medicine consultation, evaluation for possible TIPS, etc.  He has not had any previous ulcers, and symptoms are less consistent with Roz-Cano tear given hematemesis on initial episode of emesis.  Concern for coagulopathy, symptomatic anemia,  hyperammonemia.    Placed on monitor, 2 large-bore IVs established, blood obtained.  Currently hemodynamically stable.  Given IV PPI bolus, drip, octreotide, Rocephin.  Case discussed with GI here, whom agrees with recommendation for transfer to the [facility].  CBC notable for hemoglobin of 9.1 which is actually up slightly from recent.  CMP reveals sodium of 133, potassium of 5.5, alk phos 157, AST 71, ALT 54.  Bilirubin 10.7, up from previous.  INR 1.48, PTT 39.  Ammonia 112.  Patient given dose of lactulose.  Lipase, lactate, blood alcohol level normal.  MELD score 24.  Patient accepted to [facility] in transfer.  While waiting here patient had a second episode of hematemesis, 175 mL.  Remains hemodynamically stable.        ED Course as of 02/11/23 2118   Sat Feb 11, 2023 1902 Spoke w Aury, GI   1903 Hemoglobin(!): 9.1  Up from 8.1, 13d ago   1936 Potassium(!): 5.5   1936 Bilirubin Total(!): 10.7  Up from 7.0   1936 Ammonia(!!): 112   1936 INR(!): 1.48  Down from 2.07   1944 MELD 24   2000 Accepted by Dr. Dozier, [facility]    2029 175ml hematemesis       Medical Decision Making    History:    Supplemental history from: Family Member/Significant Other    External Record(s) reviewed: Inpatient Record: Recent admission here and at [facility]    Work Up:    Chart documentation includes differential considered and any EKGs or imaging independently interpreted by provider, where specified.    In additional to work up documented, I considered the following work up: Documented in chart, if applicable.    External consultation:    Discussion of management with another provider: Documented in chart, if applicable    Complicating factors:    Care impacted by chronic illness: Other: Cirrhosis    Care affected by social determinants of health: N/A    Disposition considerations: Admit.        At the conclusion of the encounter I discussed the results of all of the tests and the disposition. The questions were  answered. The patient or family acknowledged understanding and was agreeable with the care plan.    CONSULTS:  GI    CRITICAL CARE:  Critical Care  Performed by: Eloise Forbes MD  Authorized by: Eloise Forbes MD     Total critical care time: 67 minutes  Criticalcare time was exclusive of separately billable procedures and treating other patients.  Critical care was necessary to treat or prevent imminent or life-threatening deterioration of the following conditions: Cardiopulmonary decompensation, hemorrhage, death, disability  Critical care was time spent personally by me on the following activities: development of treatment plan with patient or surrogate, discussions with consultants, examination of patient, evaluation of patient's response totreatment, obtaining history from patient or surrogate, ordering and performing treatments and interventions, ordering and review of laboratory studies, ordering and review of radiographic studies and re-evaluation ofpatient's condition, this excludes any separately billable procedures.      MEDICATIONS GIVEN IN THE EMERGENCY:  Medications   pantoprazole (PROTONIX) 80 mg in sodium chloride 0.9 % 100 mL infusion (8 mg/hr Intravenous New Bag 2/11/23 1945)   octreotide (sandoSTATIN) 1,250 mcg in D5W 250 mL (50 mcg/hr Intravenous New Bag 2/11/23 1905)   pantoprazole (PROTONIX) IV push injection 80 mg (80 mg Intravenous Given 2/11/23 1909)   octreotide (sandoSTATIN) injection 50 mcg (50 mcg Intravenous Given 2/11/23 1858)   cefTRIAXone (ROCEPHIN) 1 g vial to attach to  mL bag for ADULTS or NS 50 mL bag for PEDS (1 g Intravenous New Bag 2/11/23 1908)   lactulose (CHRONULAC) solution 30 g (30 g Oral Given 2/11/23 2011)       NEW PRESCRIPTIONS STARTED AT TODAY'S ER VISIT  New Prescriptions    No medications on file          =================================================================    HPI    Patient information was obtained from: Patient, Patient's Mother and  Father    Use of Intrepreter: N/A       Dillon Salter is a 27 year old male with pertinent medical history of alcoholic cirrhosis with ascites, GI bleeds s/p variceal banding, DM type II, HTN, who presents via walk-in for evaluation of hematemesis.    Patient reportedly developed hematemesis last night and has had 4 total episodes since. His first episode was 0.5-1 cup of bright red blood. His second episode earlier today was about 2 cups of bright red to burgundy colored blood. His third episode was thicker bright red blood. Patient had a forth episode in the ED which was about 100 ml of bright red blood. Patient reported some associated generalized weakness and lightheadedness with this. Patient has had normal nonbloody BMs since last night. Patient does have a history of esophageal varices with banding in 5/2022. He also had EGD/colonoscopy performed last month due to rectal bleeding with rectal varices noted. However no clear source of bleeding was found. Per chart review, patient has not had alcohol since 4/6/2022. Patient denies any other complaints.       REVIEW OF SYSTEMS  Constitutional:  Denies fever, chills, weight loss or weakness  Eyes: Jaundiced eyes  GI: Positive for hematemesis. Denies abdominal pain, nausea, diarrhea, blood in stool  Neurologic:  Positive for generalized weakness, lightheadedness. Denies headache, focal weakness or sensory changes      PAST MEDICAL HISTORY:  Past Medical History:   Diagnosis Date     Acute on chronic anemia 04/08/2022     Alcohol use disorder      Alcoholic cirrhosis of liver with ascites (H)      Alcoholic hepatitis      Autism spectrum      Autism spectrum disorder 4/8/2022    High functioning autistic.  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 lives with parents (Leticia and Wes) socially isolated drinks alcohol beer daily      Benign  essential hypertension      Bleeding esophageal varices (H) 6/18/2022     Hepatic encephalopathy      Hyponatremia 7/28/2022     Major depressive disorder      Type II Diabetes        PAST SURGICAL HISTORY:  Past Surgical History:   Procedure Laterality Date     COLONOSCOPY N/A 1/27/2023    Procedure: Colonoscopy;  Surgeon: Osman Montoya MD;  Location: [facility] OR     ENDOSCOPIC ULTRASOUND LOWER GASTROINTESTIONAL TRACT (GI) N/A 1/27/2023    Procedure: ULTRASOUND, LOWER GI TRACT, ENDOSCOPIC with glueing;  Surgeon: Osman Montoya MD;  Location: [facility] OR     ESOPHAGOSCOPY, GASTROSCOPY, DUODENOSCOPY (EGD), COMBINED N/A 5/24/2022    Procedure: ESOPHAGOGASTRODUODENOSCOPY (EGD) with esophageal banding;  Surgeon: Gary Hurst MD;  [facility]     ESOPHAGOSCOPY, GASTROSCOPY, DUODENOSCOPY (EGD), COMBINED N/A 6/20/2022    Procedure: ESOPHAGOGASTRODUODENOSCOPY;  Surgeon: Gavino Reza MD;  [facility]     ESOPHAGOSCOPY, GASTROSCOPY, DUODENOSCOPY (EGD), COMBINED N/A 1/23/2023    Procedure: ESOPHAGOGASTRODUODENOSCOPY (EGD) with esophageal variceal banding;  Surgeon: Juan Boo MD;  [facility]     ESOPHAGOSCOPY, GASTROSCOPY, DUODENOSCOPY (EGD), COMBINED N/A 1/25/2023    Procedure: ESOPHAGOGASTRODUODENOSCOPY (EGD);  Surgeon: Juan Boo MD;  [facility]     ESOPHAGOSCOPY, GASTROSCOPY, DUODENOSCOPY (EGD), COMBINED N/A 1/27/2023    Procedure: Esophagoscopy, gastroscopy, duodenoscopy (EGD), combined;  Surgeon: Osman Montoya MD;  Location: [facility] OR     MIDLINE DOUBLE LUMEN PLACEMENT  5/26/2022          PICC TRIPLE LUMEN PLACEMENT  7/28/2022            CURRENT MEDICATIONS:    Prior to Admission Medications   Prescriptions Last Dose Informant Patient Reported? Taking?   FLUoxetine (PROZAC) 20 MG capsule   Yes No   Sig: Take 60 mg by mouth At Bedtime   acetaminophen (TYLENOL) 500 MG tablet   Yes No   Sig: Take 500 mg by mouth daily as needed for  pain   bumetanide (BUMEX) 1 MG tablet   No No   Sig: Take 1 tablet (1 mg) by mouth daily   folic acid (FOLVITE) 1 MG tablet   No No   Sig: Take 1 tablet (1 mg) by mouth daily   gabapentin (NEURONTIN) 100 MG capsule   Yes No   Sig: Take 100 mg by mouth daily as needed   ibuprofen (ADVIL/MOTRIN) 200 MG tablet   Yes No   Sig: Take 400 mg by mouth daily as needed for pain   insulin aspart (NOVOLOG FLEXPEN) 100 UNIT/ML pen   No No   Si unit per 10 grams of carb, plus additional units based on following scale   For Pre-Meal  - 164 give 1 unit. For Pre-Meal  - 189 give 2 units. For Pre-Meal  - 214 give 3 units. For Pre-Meal  - 239 give 4 units. For Pre-Meal  - 264 give 5 units. For Pre-Meal  - 289 give 6 units. For Pre-Meal  - 314 give 7 units. For Pre-Meal  - 339 give 8 units. For Pre-Meal  - 364 give 9 units.  For Pre-Meal BG greater than or equal to 365 give 10 units   Patient taking differently: 1-10 Units 1 unit per 10 grams of carb, plus additional units based on following scale   For Pre-Meal  - 164 give 1 unit. For Pre-Meal  - 189 give 2 units. For Pre-Meal  - 214 give 3 units. For Pre-Meal  - 239 give 4 units. For Pre-Meal  - 264 give 5 units. For Pre-Meal  - 289 give 6 units. For Pre-Meal  - 314 give 7 units. For Pre-Meal  - 339 give 8 units. For Pre-Meal  - 364 give 9 units.  For Pre-Meal BG greater than or equal to 365 give 10 units   insulin glargine (LANTUS PEN) 100 UNIT/ML pen   No No   Sig: Inject 20 Units Subcutaneous 2 times daily   lactulose (CEPHULAC) 10 GM packet   No No   Sig: Take 2 packets (20 g) by mouth 3 times daily   multivitamin, therapeutic (THERA-VIT) TABS tablet   No No   Sig: Take 1 tablet by mouth in the morning.   pantoprazole (PROTONIX) 40 MG EC tablet   No No   Sig: Take 1 tablet (40 mg) by mouth daily   rifaximin (XIFAXAN) 550 MG TABS tablet   No No   Sig: Take 1 tablet (550  "mg) by mouth 2 times daily   spironolactone (ALDACTONE) 100 MG tablet   Yes No   Sig: Take 100 mg by mouth At Bedtime   thiamine (B-1) 100 MG tablet   No No   Sig: Take 1 tablet (100 mg) by mouth in the morning.      Facility-Administered Medications: None       ALLERGIES:  No Known Allergies    FAMILY HISTORY:  Family History   Problem Relation Age of Onset     Hypertension Mother      Substance Abuse Mother      Thyroid Disease Mother      Heart Failure Father      Substance Abuse Father      Chronic Obstructive Pulmonary Disease Father      Substance Abuse Maternal Grandfather        SOCIAL HISTORY:  Social History     Tobacco Use     Smoking status: Former     Smokeless tobacco: Never     Tobacco comments:     A pack lasted a year, hardly smoked   Vaping Use     Vaping Use: Former   Substance Use Topics     Alcohol use: Not Currently     Comment: No ETOH since 4/6/22     Drug use: Not Currently     Types: Marijuana        VITALS:  Patient Vitals for the past 24 hrs:   BP Temp Temp src Pulse Resp SpO2 Height Weight   02/11/23 2019 -- -- -- 98 23 100 % -- --   02/11/23 2000 -- -- -- -- (!) 34 -- -- --   02/11/23 1920 -- -- -- 93 17 100 % -- --   02/11/23 1905 (!) 148/87 -- -- 96 -- 100 % -- --   02/11/23 1850 (!) 147/80 -- -- -- -- -- -- --   02/11/23 1822 (!) 140/71 97.9  F (36.6  C) Temporal 98 18 98 % 1.651 m (5' 5\") 70.3 kg (155 lb)       PHYSICAL EXAM    General Appearance: Appears older than stated age,  actively vomiting bright red blood.  Head:  Normocephalic  Eyes: Scleral icterus bilaterally  ENT:  membranes are moist without pallor  Neck:  Supple  Cardio:  Regular rate and rhythm  Pulm:  No respiratory distress  Abdomen:  Soft, non-tender, distended, no rebound or guarding.  Extremities: Normal gait  Skin:  Frankly jaundiced. Skin warm, dry, no rashes  Neuro:  Alert and oriented ×3     RADIOLOGY/LABS:  Reviewed all pertinent imaging. Please see official radiology report. All pertinent labs reviewed and " interpreted.    Results for orders placed or performed during the hospital encounter of 02/11/23   Comprehensive metabolic panel   Result Value Ref Range    Sodium 133 (L) 136 - 145 mmol/L    Potassium 5.5 (H) 3.5 - 5.0 mmol/L    Chloride 96 (L) 98 - 107 mmol/L    Carbon Dioxide (CO2) 27 22 - 31 mmol/L    Anion Gap 10 5 - 18 mmol/L    Urea Nitrogen 33 (H) 8 - 22 mg/dL    Creatinine 1.18 0.70 - 1.30 mg/dL    Calcium 9.9 8.5 - 10.5 mg/dL    Glucose 216 (H) 70 - 125 mg/dL    Alkaline Phosphatase 157 (H) 45 - 120 U/L    AST 71 (H) 0 - 40 U/L    ALT 54 (H) 0 - 45 U/L    Protein Total 8.4 (H) 6.0 - 8.0 g/dL    Albumin 3.6 3.5 - 5.0 g/dL    Bilirubin Total 10.7 (H) 0.0 - 1.0 mg/dL    GFR Estimate 86 >60 mL/min/1.73m2   Result Value Ref Range    INR 1.48 (H) 0.85 - 1.15   Partial thromboplastin time   Result Value Ref Range    aPTT 39 (H) 22 - 38 Seconds   Result Value Ref Range    Lipase <9 0 - 52 U/L   Lactic acid whole blood   Result Value Ref Range    Lactic Acid 1.7 0.7 - 2.0 mmol/L   Alcohol ethyl   Result Value Ref Range    Alcohol, Blood <10 None detected mg/dL   Ammonia (on ice)   Result Value Ref Range    Ammonia 112 (HH) 11 - 35 umol/L   CBC with platelets and differential   Result Value Ref Range    WBC Count 11.1 (H) 4.0 - 11.0 10e3/uL    RBC Count 2.68 (L) 4.40 - 5.90 10e6/uL    Hemoglobin 9.1 (L) 13.3 - 17.7 g/dL    Hematocrit 27.4 (L) 40.0 - 53.0 %     (H) 78 - 100 fL    MCH 34.0 (H) 26.5 - 33.0 pg    MCHC 33.2 31.5 - 36.5 g/dL    RDW 17.0 (H) 10.0 - 15.0 %    Platelet Count 171 150 - 450 10e3/uL    % Neutrophils 84 %    % Lymphocytes 11 %    % Monocytes 2 %    % Eosinophils 1 %    % Basophils 1 %    % Immature Granulocytes 1 %    NRBCs per 100 WBC 0 <1 /100    Absolute Neutrophils 9.3 (H) 1.6 - 8.3 10e3/uL    Absolute Lymphocytes 1.3 0.8 - 5.3 10e3/uL    Absolute Monocytes 0.3 0.0 - 1.3 10e3/uL    Absolute Eosinophils 0.1 0.0 - 0.7 10e3/uL    Absolute Basophils 0.1 0.0 - 0.2 10e3/uL    Absolute  Immature Granulocytes 0.1 <=0.4 10e3/uL    Absolute NRBCs 0.0 10e3/uL   Adult Type and Screen   Result Value Ref Range    ABO/RH(D) A NEG     Antibody Screen Negative Negative    SPECIMEN EXPIRATION DATE [id number]        The creation of this record is based on the scribe s observations of the work being performed by Eloise Forbes MD and the provider s statements to them. It was created on her behalf by Gavino Atwood, a trained medical scribe. This document has been checked and approved by the attending provider.    Eloise Forbes MD  Emergency Medicine  [facility] EMERGENCY ROOM  [facility] [facility] [address] [phone]  Dept: [phone]     Eloise Forbes MD  02/11/23 0048

## 2023-02-12 NOTE — PROVIDER NOTIFICATION
Left message for patient's wife to call the office. Pt had episode of bloody emesis. 250mL collected and more in bed. Nuno red blood orally and nasally. MD at bedside to evaluate.

## 2023-02-12 NOTE — CONSULTS
Care Management Initial Consult    General Information  Assessment completed with: Parents, Leticia  Type of CM/SW Visit: Initial Assessment    Primary Care Provider verified and updated as needed: Yes   Readmission within the last 30 days:     Reason for Consult: discharge planning  Advance Care Planning:         Communication Assessment  Patient's communication style: spoken language (English or Bilingual)        Cognitive  Cognitive/Neuro/Behavioral: .WDL except, arousability, motor response, orientation, speech  Level of Consciousness: lethargic  Arousal Level: arouses to repeated stimulation  Orientation: other (see comments) (GRACE)        Speech: GRACE (unable to assess) (not responding verbally)    Living Environment:   People in home: parent(s)     Current living Arrangements: house      Able to return to prior arrangements: yes     Family/Social Support:  Care provided by: self, parent(s)  Provides care for: no one     Parent(s), Other (specify) (additional family nearby and able to help)          Description of Support System: Involved, Supportive       Current Resources:   Patient receiving home care services: Yes  Skilled Home Care Services: Skilled Nursing, Physicial Therapy, Occupational Therapy ([facility])  Community Resources: Chemical Dependency Services, OP Mental Health  Equipment currently used at home: glucometer  Supplies currently used at home: None    Employment/Financial:  Employment Status: unemployed       Functional Status:  Prior to admission patient needed assistance:   Dependent ADLs:: Independent  Dependent IADLs:: Cleaning, Cooking, Laundry, Shopping, Meal Preparation, Medication Management, Transportation    Additional Information:  Met with patient/patient's mother Leticia to complete initial assessment in response to elevated readmission risk score. Assessment completed with patient's mother as patient was sleeping, per nurse continues to be pretty lethargic. Patient currently lives in a  house in [place] with his parents. He is independent with ADLs at baseline, parents assist with IADLs. They have additional family nearby and able to help as needed. Patient was receiving therapy for alcohol dependency via zoom prior to admission and home health services through [facility].    Call placed to [facility]. Verified patient is open for RN/PT/OT. Will add home care resumptions orders.     CM will continue to follow for discharge needs.    [facility]  Ph: [phone]   Fax: [phone]    Karey Infante RN, BSN  6A RN Care Coordinator  Ph: [phone]   Pager: [phone]

## 2023-02-12 NOTE — PHARMACY-CONSULT NOTE
Pharmacy Tube Feeding Consult    Medication reviewed for administration by feeding tube and for potential food/drug interactions.    Recommendation: Recommend changing the following medications to a liquid dosage form:     Fluoxetine - done for you     Acetaminophen - done for you    Pharmacy will continue to follow as new medications are ordered.      Maria Esther Scales, PharmD  Pharmacy Resident

## 2023-02-12 NOTE — ANESTHESIA CARE TRANSFER NOTE
Patient: Dillon Salter    Procedure: Procedure(s):  ESOPHAGOGASTRODUODENOSCOPY (EGD)       Diagnosis: Hematemesis, unspecified whether nausea present [K92.0]  Diagnosis Additional Information: No value filed.    Anesthesia Type:   General     Note:    Oropharynx: oropharynx clear of all foreign objects and spontaneously breathing  Level of Consciousness: drowsy  Oxygen Supplementation: face mask  Level of Supplemental Oxygen (L/min / FiO2): 6  Independent Airway: airway patency satisfactory and stable  Dentition: dentition unchanged  Vital Signs Stable: post-procedure vital signs reviewed and stable  Report to RN Given: handoff report given  Patient transferred to: PACU    Handoff Report: Identifed the Patient, Identified the Reponsible Provider, Reviewed the pertinent medical history, Discussed the surgical course, Reviewed Intra-OP anesthesia mangement and issues during anesthesia, Set expectations for post-procedure period and Allowed opportunity for questions and acknowledgement of understanding      Vitals:  Vitals Value Taken Time   /71 02/12/23 0251   Temp     Pulse 111 02/12/23 0256   Resp 15    SpO2 96 % 02/12/23 0256   Vitals shown include unvalidated device data.    Electronically Signed By: VAMSHI Ramírez CRNA  February 12, 2023  2:57 AM

## 2023-02-12 NOTE — ANESTHESIA PREPROCEDURE EVALUATION
Anesthesia Pre-Procedure Evaluation    Patient: Dillon Salter   MRN: [id number] : 1995        Procedure : Procedure(s):  ESOPHAGOGASTRODUODENOSCOPY (EGD)          Past Medical History:   Diagnosis Date     Acute on chronic anemia 2022     Alcohol use disorder      Alcohol use disorder, severe, dependence (H) 2022     Alcoholic cirrhosis of liver with ascites (H)      Alcoholic hepatitis      Autism spectrum      Autism spectrum disorder 2022    High functioning autistic.  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 lives with parents (Leticia and Wes) socially isolated drinks alcohol beer daily      Benign essential hypertension      Bleeding esophageal varices (H) 2022     Hepatic encephalopathy      Hyponatremia 2022     Major depressive disorder      Type II Diabetes       Past Surgical History:   Procedure Laterality Date     COLONOSCOPY N/A 2023    Procedure: Colonoscopy;  Surgeon: Osman Montoya MD;  Location: [facility] OR     ENDOSCOPIC ULTRASOUND LOWER GASTROINTESTIONAL TRACT (GI) N/A 2023    Procedure: ULTRASOUND, LOWER GI TRACT, ENDOSCOPIC with glueing;  Surgeon: Osman Montoya MD;  Location: [facility] OR     ESOPHAGOSCOPY, GASTROSCOPY, DUODENOSCOPY (EGD), COMBINED N/A 2022    Procedure: ESOPHAGOGASTRODUODENOSCOPY (EGD) with esophageal banding;  Surgeon: Gary Hurst MD;  Location: [facility] Main OR     ESOPHAGOSCOPY, GASTROSCOPY, DUODENOSCOPY (EGD), COMBINED N/A 2022    Procedure: ESOPHAGOGASTRODUODENOSCOPY;  Surgeon: Gavino Reza MD;  Location: [facility] Main OR     ESOPHAGOSCOPY, GASTROSCOPY, DUODENOSCOPY (EGD), COMBINED N/A 2023    Procedure: ESOPHAGOGASTRODUODENOSCOPY (EGD) with esophageal variceal banding;  Surgeon: Juan Boo MD;  Location: [facility] Main OR     ESOPHAGOSCOPY, GASTROSCOPY,  DUODENOSCOPY (EGD), COMBINED N/A 1/25/2023    Procedure: ESOPHAGOGASTRODUODENOSCOPY (EGD);  Surgeon: Juan Boo MD;  Location: [facility] Main OR     ESOPHAGOSCOPY, GASTROSCOPY, DUODENOSCOPY (EGD), COMBINED N/A 1/27/2023    Procedure: Esophagoscopy, gastroscopy, duodenoscopy (EGD), combined;  Surgeon: Osman Montoya MD;  Location: [facility] OR     MIDLINE DOUBLE LUMEN PLACEMENT  5/26/2022          PICC TRIPLE LUMEN PLACEMENT  7/28/2022           No Known Allergies   Social History     Tobacco Use     Smoking status: Former     Smokeless tobacco: Never     Tobacco comments:     A pack lasted a year, hardly smoked   Substance Use Topics     Alcohol use: Not Currently     Comment: No ETOH since 4/6/22      Wt Readings from Last 1 Encounters:   02/12/23 65.9 kg (145 lb 4.5 oz)        Anesthesia Evaluation   Pt has had prior anesthetic. Type: General.    No history of anesthetic complications       ROS/MED HX  ENT/Pulmonary:       Neurologic:       Cardiovascular:     (+) hypertension-----    METS/Exercise Tolerance: >4 METS    Hematologic:       Musculoskeletal:       GI/Hepatic:     (+) hepatitis type Alcoholic, liver disease,  (-) GERD   Renal/Genitourinary:       Endo:     (+) type II DM, Obesity,     Psychiatric/Substance Use:       Infectious Disease:       Malignancy:       Other:            Physical Exam    Airway   unable to assess          Respiratory Devices and Support     Nasal Canula 2  l/min.     Dental    unable to assess        Cardiovascular          Rhythm and rate: tachycardia     Pulmonary   pulmonary exam normal            Other findings: Blood around mouth; vomit bag in his lap    OUTSIDE LABS:  CBC:   Lab Results   Component Value Date    WBC 11.1 (H) 02/11/2023    WBC 10.4 01/29/2023    HGB 7.7 (L) 02/12/2023    HGB 8.0 (L) 02/11/2023    HCT 27.4 (L) 02/11/2023    HCT 23.8 (L) 01/29/2023     02/11/2023     01/29/2023     BMP:   Lab Results   Component Value Date      02/12/2023     (L) 02/11/2023    POTASSIUM 4.3 02/12/2023    POTASSIUM 5.7 (H) 02/11/2023    CHLORIDE 96 (L) 02/11/2023    CHLORIDE 97 (L) 01/29/2023    CO2 27 02/11/2023    CO2 23 01/29/2023    BUN 33 (H) 02/11/2023    BUN 26.2 (H) 01/29/2023    CR 1.18 02/11/2023    CR 0.74 01/29/2023     (H) 02/12/2023     (H) 02/12/2023     COAGS:   Lab Results   Component Value Date    PTT 39 (H) 02/11/2023    INR 1.48 (H) 02/11/2023    FIBR 174 01/29/2023     POC: No results found for: BGM, HCG, HCGS  HEPATIC:   Lab Results   Component Value Date    ALBUMIN 3.6 02/11/2023    PROTTOTAL 8.4 (H) 02/11/2023    ALT 54 (H) 02/11/2023    AST 71 (H) 02/11/2023    ALKPHOS 157 (H) 02/11/2023    BILITOTAL 10.7 (H) 02/11/2023    DESI 112 (HH) 02/11/2023     OTHER:   Lab Results   Component Value Date    LACT 2.8 (H) 02/12/2023    A1C 5.6 01/28/2023    SARTHAK 9.9 02/11/2023    PHOS 2.5 01/28/2023    MAG 2.0 01/28/2023    LIPASE <9 02/11/2023    TSH 1.09 07/30/2022       Anesthesia Plan    ASA Status:  4, emergent    NPO Status:  ELEVATED Aspiration Risk/Unknown    Anesthesia Type: General.     - Airway: ETT   Induction: Intravenous, RSI, Etomidate.   Maintenance: Balanced.   Techniques and Equipment:     - Airway: Video-Laryngoscope     - Lines/Monitors: 2nd IV     Consents    Anesthesia Plan(s) and associated risks, benefits, and realistic alternatives discussed. Questions answered and patient/representative(s) expressed understanding.     - Discussed: Risks, Benefits and Alternatives for BOTH SEDATION and the PROCEDURE were discussed     - Discussed with:  Patient      - Extended Intubation/Ventilatory Support Discussed: No.      - Patient is DNR/DNI Status: No    Use of blood products discussed: Yes.     - Discussed with: Implied consent/Emergency.     Postoperative Care    Pain management: IV analgesics.   PONV prophylaxis: Ondansetron (or other 5HT-3), Dexamethasone or Solumedrol     Comments:           H&P reviewed:  Unable to attach H&P to encounter due to EHR limitations. H&P Update: appropriate H&P reviewed, patient examined. No interval changes since H&P (within 30 days).         Robert Casas MD

## 2023-02-12 NOTE — ANESTHESIA PROCEDURE NOTES
Airway       Patient location during procedure: OR       Procedure Start/Stop Times: 2/12/2023 2:02 AM  Staff -        Anesthesiologist:  Robert Casas MD       CRNA: Zoe Dial APRN CRNA       Performed By: CRNAIndications and Patient Condition       Indications for airway management: jalen-procedural       Induction type:intravenous       Mask difficulty assessment: 0 - not attempted    Final Airway Details       Final airway type: endotracheal airway       Successful airway: ETT - single  Endotracheal Airway Details        ETT size (mm): 7.0       Cuffed: yes       Successful intubation technique: direct laryngoscopy and video laryngoscopy       VL Blade Size: MAC D Blade       Grade View of Cords: 1       Adjucts: stylet       Position: Right       Secured at (cm): 22    Post intubation assessment        Placement verified by: capnometry, equal breath sounds and chest rise        Number of attempts at approach: 1       Secured with: pink tape       Ease of procedure: easy       Dentition: Intact and Unchanged    Medication(s) Administered   Medication Administration Time: 2/12/2023 2:02 AM

## 2023-02-12 NOTE — PROGRESS NOTES
Pt still drowsy, but pulls away from this writer with stimulation.  When assessing pupils patient pulled eyes shut.  Pt will hold his head up and moan at intervals.

## 2023-02-12 NOTE — PROGRESS NOTES
"CLINICAL NUTRITION SERVICES - ASSESSMENT NOTE     Nutrition Prescription    RECOMMENDATIONS FOR MDs/PROVIDERS TO ORDER:  1. Resume thiamine supplementation (daily), when able.    2. Adjust free water flushes via feeding tube as needed, pending fluid status. Currently, free water flushes are minimal, or for patency.    3. Monitor potential need to adjust IVFs as needed  4. Monitor potential need for zinc supplementation, pending stooling.  5. Vitamin D supplementation. Pt's vitamin D deficiency lab was 17 on 12/202022.     Malnutrition Status:    Severe malnutrition in the context of acute illness/injury on chronic illness    Recommendations already ordered by Registered Dietitian (RD):  TF, patency free water flushes - orders in place for when feeding tube is placed  Multivitamin  Prn oral supplement order    Future/Additional Recommendations:  1. Diet advancement as per provider, when appropriate. Encourage intake of oral supplements and small, frequent meals. Monitor fluid status and potential need for low-sodium diet, fluid restriction if needed. Monitor K+ and phos trends (5.4 and 4.6 respectively on 2/12).  2. Monitor BG control. Hbg A1c of 5.6 on 1/28/23. DM2.   3. Check methylmalonic acid.   4. Continue to replace Mg++.  5. Consider scheduling antiemetics/antinauseants if/when warranted.     REASON FOR ASSESSMENT  Dillon Salter is a/an 27 year old male assessed by the dietitian for Provider Order - \"Registered Dietitian to Assess and Order TF per Medical Nutrition Therapy Recommendations. Dietitian to place (and replace as necessary) a post-pyloric feeding tube at the bedside using Enteral access system (Cortrak).  If tube placement needed outside of Dietitian hours: Nursing is to CALL and DISCUSS situation with the Radiologist.  Dietitian to Assess and Order per Nutrition Policy. Provider is responsible for nutrition oversight.\"    NUTRITION/ADDITIONAL HISTORY  Per RD note 1/26/2023, \"Pt known to Clinical " "Nutrition - last assessed by outpatient RD for Liver Transplant evaluation on 12/20/2022. Per that note: Diet Recall- eats 2x/day \"not enough\"  Chicken, eggs, waffles, pork chops, chili, less f/v now in the winter   Yuly- apple juice (30 oz/day), skim milk (8 cups/day), less water in the winter, sporadic hot tea, Gatorade (8 oz/day)  Eats peanut butter, some hamburger, not much starch (rice), PB toast  Snacks- mini muffins, junk food   Dining out- 2x/week- [employer]- chicken & cheese quesadilla    Pizza   Per visit with pt at bedside today [1/26], pt reports he's had minimal PO since Sunday. Pt shares that he \"never goes hungry\" when asked how eating and appetite were at home. He denies any early satiety or appetite issues. Pt is vague historian. Denies any vitamin/mineral supplementation. No food allergies.Diet: CLD. Intake/Tolerance: Pt willing to have apple juice and hot black tea for lunch (ordered by writer).\"    Per H & P, \" Asperger's, alcoholic cirrhosis (diagnosed 4/2022) c/b esophageal varices and hepatic encephalopathy currently undergoing liver transplant evaluation at [facility], DM II who was directly admitted from [facility] for hematemesis, acute on chronic blood loss anemia, decompensated cirrhosis and hepatic encephalopathy. Hemodynamically stable but with ongoing anu hematemesis. Portal hypertensive gastropathy. Recurrent rectal varices 2-3 episodes hematemesis prior to ED of ~400 mL, ~325 mL at ER, 250 mL anu hematemesis since arrival. Patient has been weak for the past 3 days but taking medications regularly up until 2/11/23. Mother is his caretaker, lives with parents. Had been eating OK up until day prior.\" Pt was ordered to take, noting, bumex, folic acid, insulin, thera-vit, rifaximin, lactulose, Protonix, and thiamine PTA. Per GI note, \"He denies feeling nausea, no abdominal pain. He has been feeling well recently. No abdominal distension.\"    Pt was resting during nutrition visit. " "Pt's mother and father were at his bedside and helped to provide nutrition history today (2/12). Pt's mother shared that pt last ate on 2/10 and he ate well that day. Overall, pt's parents mentioned he has been eating less than normal amounts at times but no specify reason why [suspect due to lower appetite]. He occasionally likes to have vanilla Ensure. No known food allergies.     CURRENT NUTRITION ORDERS  Diet: NPO since admit (2/11)  Nutrition Support: None so far this admission. No feeding tube in place.     LABS  Labs reviewed    MEDICATIONS  Medications reviewed. Received NS bolus, on LR earlier today (2/12)    ANTHROPOMETRICS  Height: 165.1 cm (5' 5\")    Most Recent Weight: 65.9 kg (145 lb 4.5 oz) on 2/12  IBW: 61.8 kg   BMI: Normal BMI  Weight History: 72 kg (4/12/2022), 69.1 kg (8/4/2022), 75.4 kg (8/16/2022), 60.9 kg (10/6/2022), 60.8 kg (12/20/2022), 66.3 kg (1/22/23) - Pt has lost 12.6% of body wt over the last six months. However, wt varies due to changes in fluid status, ascites. Per pt's parents, pt weighed 250# (113.6 kg) years ago. He has lost wt since this time and has generally weighed 145-165# (65.9-75 kg) depending on how much fluid he has on him.   Dosing Weight: 66 kg (based on lowest wt so far this admission of 65.9 kg on 2/12)    ASSESSED NUTRITION NEEDS (for inpatient hospital stay)  Estimated Energy Needs: 6439-3461 kcals/day (30 - 35 kcals/kg)  Justification: Increased needs  Estimated Protein Needs: 79-99 grams protein/day (1.2 - 1.5 grams of pro/kg)  Justification: Increased needs, pending renal function  Estimated Fluid Needs: 3655-3371 mL/day (25 - 30 mL/kg)   Justification: Maintenance needs or Per provider pending overall fluid status    PHYSICAL FINDINGS/OTHER FINDINGS  See malnutrition section below.  HEENT: Lips are cracked and dry.   Neuro: \"AxO x2 (self and place, thought it was 1/29/2022). Following some commands but forgetful and repetitive conversation. Asterixis " "bilaterally,\" Per provider. Note, ammonia of 112 on 2/11.   Musculoskeletal: Per provider, \"decreased muscle tone.\"  Resp: No O2.   GI: Having zero to one stool daily. Last stool on 2/12. Stools were last soft and brown. Mildly distended abd. Received zofran on 2/11. Received simethicone 2/12. Nausea noted 2/12. Emesis 2/12. EGD 2/12, \"Esophageal ulcers with no bleeding and no stigmata  of recent bleeding. Small (< 5 mm) esophageal varices.  Portal hypertensive gastropathy. Normal examined duodenum. Possible that he bled from post-banding ulcers, but no stigmata to make this highly likely. Diffuse oozing noted from portal hypertensive gastropathy was more likely source.\"     MALNUTRITION  % Intake: Decreased intake does not meet criteria  % Weight Loss: Difficult to assess wt changes with changes in fluid status  Subcutaneous Fat Loss: Facial region:  Mild and Arms: Severe  Muscle Loss: Temporal:  Mild, Thoracic region (clavicle, acromium bone, deltoid, trapezius, pectoral):  Mild, Dorsal hand:  Moderate, Patella: Moderate, and Posterior calf:  Moderate  Fluid Accumulation/Edema: None noted peripherally  Malnutrition Diagnosis: Severe malnutrition in the context of acute illness/injury on chronic illness    NUTRITION DIAGNOSIS  Inadequate protein-energy intake related to GI sx including N/V and NPO status as evidenced by pt's last oral intake was about 48 hours ago.       INTERVENTIONS  Implementation  Nutrition Education: Discussed nutrition plan with pt's parents (pt sleeping).   Collaboration with other providers: Paged provider and discussed. RD unable to place feeding tubes on floor patients or on weekends (weekends due to staffing). Explained that pt does have an XR feeding tube placement order currently in place.   Enteral Nutrition: Ordered Novasource Renal, concentrated TF and lower in K+ and phos, (to start once feeding tube is in place), advancing to goal rate of 45 ml/hr (1080 ml) which provides 2160 " kcal (33 kcals/kg), 98 g pro (1.5 g protein/kg), 198 g CHO, 774 ml free water, and 0 g fiber daily.   Feeding tube flush: Patency free water flushes   Medical food supplement therapy: Placed prn snack/supplement order to offer vanilla Ensure Enlive once diet is advanced.   Multivitamin/mineral supplement therapy: Ordered thera-vit (to be crushed) to help meet micronutrient needs..   Ordered Mg++ and phos labs    Goals  Diet adv v nutrition support within 2-3 days.  Total avg nutritional intake to meet a minimum of 30 kcal/kg and 1.2 g PRO/kg daily (per dosing wt 66 kg).     Monitoring/Evaluation  Progress toward goals will be monitored and evaluated per protocol.        Nutrition will continue to follow.      Claudia Mcleod, MS, RD, LD, CNSC      Saturday/Sunday Pgr: [phone]    6B RD pager: [phone]

## 2023-02-12 NOTE — ANESTHESIA POSTPROCEDURE EVALUATION
Patient: Dillon Salter    Procedure: Procedure(s):  ESOPHAGOGASTRODUODENOSCOPY (EGD)       Anesthesia Type:  General    Note:  Disposition: Inpatient   Postop Pain Control: Uneventful            Sign Out: Well controlled pain   PONV: No   Neuro/Psych: Uneventful            Sign Out: Acceptable/Baseline neuro status   Airway/Respiratory: Uneventful            Sign Out: Acceptable/Baseline resp. status   CV/Hemodynamics: Uneventful            Sign Out: Acceptable CV status; No obvious hypovolemia; No obvious fluid overload   Other NRE: NONE   DID A NON-ROUTINE EVENT OCCUR? No           Last vitals:  Vitals:    02/11/23 2235 02/11/23 2350   BP: 132/69 138/68   Pulse: 89 97   Temp: 36.3  C (97.3  F)    SpO2: 100% 100%       Electronically Signed By: Robert Casas MD  February 12, 2023  2:58 AM

## 2023-02-12 NOTE — PLAN OF CARE
Goal Outcome Evaluation:      Plan of Care Reviewed With:  (parents (pt sleeping))    Overall Patient Progress: decliningOverall Patient Progress: declining    Outcome Evaluation: Plan is for feeding tube placement, TFs. Diet advancement as per provider, when appropriate. Encourage intake of oral supplements and small, frequent meals. Monitor K+ and phos trends (5.4 and 4.6 respectively on 2/12). Rec resume thiamine.See RD note 2/12 for all details

## 2023-02-12 NOTE — PROGRESS NOTES
Verified with Dr. Javier that blood that was ordered in OR does not need to be transfused.  Still okay to not transfuse.

## 2023-02-12 NOTE — PROGRESS NOTES
"[facility]  Transfer Triage Note    Date of call: 02/11/23  Time of call: 8:04 PM    Current Patient Location: [facility] ED  Current Level of Care: ED    Vitals: Temp: 97.9  F (36.6  C) Temp src: Temporal BP: (!) 148/87 Pulse: 93   Resp: 17 SpO2: 100 % Height: 165.1 cm (5' 5\") Weight: 70.3 kg (155 lb)  O2 Device: None (Room air) at    Diagnosis: Hematemesis in a patient with alcoholic cirrhosis c/b ascites and esophageal varices   Is COVID-19 a concern? No  Reason for requested transfer: Patient has established care here  Further diagnostic work up, management, and consultation for specialized care   Isolation Needs: None    Outside Records: Available  Additional records may be faxed to [phone].    Transfer accepted: Yes  Stability of Patient: Patient is at risk for instability, however patient requires urgent transfer and does not meet ICU criteria   Level of Care Needed: IMC  Telemetry Needed:  Med (Remote) Telemetry  Expected Time of Arrival for Transfer: 0-8 hours  Arrival Location:  [facility]    Recommendations for Management and Stabilization: Not needed    Additional Comments: 28 y/o male with history of decompensated alcoholic cirrhosis who presented to  ED with significant hematemesis. Hgb stable, labs notable for ASHISH, elevated ammonia 112. Not encephalopathic at this time. Started on PPI, octreotide, and given a dose of ceftriaxone. ED provider discussed with GI at  who thought patient better served coming to [place] where GI team here is familiar and in case intervention needed. Had a recent admission here at [facility] and was seen by GI for LGIB. Known varices, has been under consideration for TIPS. Transfer accepted for [facility] level care for close monitoring.     Delia Dozier MD      "

## 2023-02-13 LAB
ALBUMIN SERPL BCG-MCNC: 3.2 G/DL (ref 3.5–5.2)
ALP SERPL-CCNC: 98 U/L (ref 40–129)
ALT SERPL W P-5'-P-CCNC: 43 U/L (ref 10–50)
ANION GAP SERPL CALCULATED.3IONS-SCNC: 17 MMOL/L (ref 7–15)
AST SERPL W P-5'-P-CCNC: ABNORMAL U/L
BILIRUB SERPL-MCNC: 9.3 MG/DL
BUN SERPL-MCNC: 54 MG/DL (ref 6–20)
CALCIUM SERPL-MCNC: 9.2 MG/DL (ref 8.6–10)
CHLORIDE SERPL-SCNC: 97 MMOL/L (ref 98–107)
CREAT SERPL-MCNC: 1.3 MG/DL (ref 0.67–1.17)
DEPRECATED HCO3 PLAS-SCNC: 17 MMOL/L (ref 22–29)
ERYTHROCYTE [DISTWIDTH] IN BLOOD BY AUTOMATED COUNT: 18.6 % (ref 10–15)
ERYTHROCYTE [DISTWIDTH] IN BLOOD BY AUTOMATED COUNT: 18.9 % (ref 10–15)
GFR SERPL CREATININE-BSD FRML MDRD: 76 ML/MIN/1.73M2
GLUCOSE BLDC GLUCOMTR-MCNC: 137 MG/DL (ref 70–99)
GLUCOSE BLDC GLUCOMTR-MCNC: 168 MG/DL (ref 70–99)
GLUCOSE BLDC GLUCOMTR-MCNC: 257 MG/DL (ref 70–99)
GLUCOSE BLDC GLUCOMTR-MCNC: 265 MG/DL (ref 70–99)
GLUCOSE BLDC GLUCOMTR-MCNC: 322 MG/DL (ref 70–99)
GLUCOSE BLDC GLUCOMTR-MCNC: 330 MG/DL (ref 70–99)
GLUCOSE BLDC GLUCOMTR-MCNC: 346 MG/DL (ref 70–99)
GLUCOSE BLDC GLUCOMTR-MCNC: 367 MG/DL (ref 70–99)
GLUCOSE BLDC GLUCOMTR-MCNC: 386 MG/DL (ref 70–99)
GLUCOSE BLDC GLUCOMTR-MCNC: 388 MG/DL (ref 70–99)
GLUCOSE BLDC GLUCOMTR-MCNC: 404 MG/DL (ref 70–99)
GLUCOSE BLDC GLUCOMTR-MCNC: 408 MG/DL (ref 70–99)
GLUCOSE BLDC GLUCOMTR-MCNC: 410 MG/DL (ref 70–99)
GLUCOSE BLDC GLUCOMTR-MCNC: 426 MG/DL (ref 70–99)
GLUCOSE BLDC GLUCOMTR-MCNC: 427 MG/DL (ref 70–99)
GLUCOSE BLDC GLUCOMTR-MCNC: 84 MG/DL (ref 70–99)
GLUCOSE BLDC GLUCOMTR-MCNC: 88 MG/DL (ref 70–99)
GLUCOSE SERPL-MCNC: 353 MG/DL (ref 70–99)
HCT VFR BLD AUTO: 25.3 % (ref 40–53)
HCT VFR BLD AUTO: 25.8 % (ref 40–53)
HGB BLD-MCNC: 7.9 G/DL (ref 13.3–17.7)
HGB BLD-MCNC: 8.6 G/DL (ref 13.3–17.7)
HGB BLD-MCNC: 8.8 G/DL (ref 13.3–17.7)
HGB BLD-MCNC: 8.9 G/DL (ref 13.3–17.7)
INR PPP: 1.94 (ref 0.85–1.15)
LACTATE SERPL-SCNC: 1.8 MMOL/L (ref 0.7–2)
LACTATE SERPL-SCNC: 3 MMOL/L (ref 0.7–2)
LACTATE SERPL-SCNC: 4.7 MMOL/L (ref 0.7–2)
LACTATE SERPL-SCNC: 5.3 MMOL/L (ref 0.7–2)
MCH RBC QN AUTO: 33 PG (ref 26.5–33)
MCH RBC QN AUTO: 33.6 PG (ref 26.5–33)
MCHC RBC AUTO-ENTMCNC: 33.3 G/DL (ref 31.5–36.5)
MCHC RBC AUTO-ENTMCNC: 34.8 G/DL (ref 31.5–36.5)
MCV RBC AUTO: 97 FL (ref 78–100)
MCV RBC AUTO: 99 FL (ref 78–100)
PLATELET # BLD AUTO: 130 10E3/UL (ref 150–450)
PLATELET # BLD AUTO: 138 10E3/UL (ref 150–450)
POTASSIUM SERPL-SCNC: 4.7 MMOL/L (ref 3.4–5.3)
POTASSIUM SERPL-SCNC: 5.2 MMOL/L (ref 3.4–5.3)
POTASSIUM SERPL-SCNC: 5.7 MMOL/L (ref 3.4–5.3)
POTASSIUM SERPL-SCNC: 5.8 MMOL/L (ref 3.4–5.3)
PROT SERPL-MCNC: 7 G/DL (ref 6.4–8.3)
RBC # BLD AUTO: 2.61 10E6/UL (ref 4.4–5.9)
RBC # BLD AUTO: 2.62 10E6/UL (ref 4.4–5.9)
SODIUM SERPL-SCNC: 131 MMOL/L (ref 136–145)
WBC # BLD AUTO: 10.8 10E3/UL (ref 4–11)
WBC # BLD AUTO: 8.1 10E3/UL (ref 4–11)

## 2023-02-13 PROCEDURE — 120N000003 HC R&B IMCU UMMC

## 2023-02-13 PROCEDURE — 85018 HEMOGLOBIN: CPT

## 2023-02-13 PROCEDURE — 84155 ASSAY OF PROTEIN SERUM: CPT

## 2023-02-13 PROCEDURE — 83605 ASSAY OF LACTIC ACID: CPT | Performed by: NURSE PRACTITIONER

## 2023-02-13 PROCEDURE — 250N000013 HC RX MED GY IP 250 OP 250 PS 637: Performed by: STUDENT IN AN ORGANIZED HEALTH CARE EDUCATION/TRAINING PROGRAM

## 2023-02-13 PROCEDURE — 36415 COLL VENOUS BLD VENIPUNCTURE: CPT | Performed by: NURSE PRACTITIONER

## 2023-02-13 PROCEDURE — 99233 SBSQ HOSP IP/OBS HIGH 50: CPT | Mod: GC | Performed by: INTERNAL MEDICINE

## 2023-02-13 PROCEDURE — 84132 ASSAY OF SERUM POTASSIUM: CPT

## 2023-02-13 PROCEDURE — 99232 SBSQ HOSP IP/OBS MODERATE 35: CPT | Mod: GC | Performed by: INTERNAL MEDICINE

## 2023-02-13 PROCEDURE — 258N000003 HC RX IP 258 OP 636: Performed by: STUDENT IN AN ORGANIZED HEALTH CARE EDUCATION/TRAINING PROGRAM

## 2023-02-13 PROCEDURE — 250N000011 HC RX IP 250 OP 636

## 2023-02-13 PROCEDURE — C9113 INJ PANTOPRAZOLE SODIUM, VIA: HCPCS | Performed by: STUDENT IN AN ORGANIZED HEALTH CARE EDUCATION/TRAINING PROGRAM

## 2023-02-13 PROCEDURE — 36416 COLLJ CAPILLARY BLOOD SPEC: CPT

## 2023-02-13 PROCEDURE — 85610 PROTHROMBIN TIME: CPT

## 2023-02-13 PROCEDURE — 85027 COMPLETE CBC AUTOMATED: CPT

## 2023-02-13 PROCEDURE — 258N000003 HC RX IP 258 OP 636

## 2023-02-13 PROCEDURE — 250N000009 HC RX 250

## 2023-02-13 PROCEDURE — 250N000011 HC RX IP 250 OP 636: Performed by: STUDENT IN AN ORGANIZED HEALTH CARE EDUCATION/TRAINING PROGRAM

## 2023-02-13 PROCEDURE — 36415 COLL VENOUS BLD VENIPUNCTURE: CPT

## 2023-02-13 PROCEDURE — 83605 ASSAY OF LACTIC ACID: CPT

## 2023-02-13 PROCEDURE — 250N000013 HC RX MED GY IP 250 OP 250 PS 637

## 2023-02-13 PROCEDURE — 250N000011 HC RX IP 250 OP 636: Mod: JA

## 2023-02-13 PROCEDURE — P9047 ALBUMIN (HUMAN), 25%, 50ML: HCPCS

## 2023-02-13 PROCEDURE — 999N000127 HC STATISTIC PERIPHERAL IV START W US GUIDANCE

## 2023-02-13 RX ORDER — DEXTROSE MONOHYDRATE 100 MG/ML
INJECTION, SOLUTION INTRAVENOUS CONTINUOUS PRN
Status: DISCONTINUED | OUTPATIENT
Start: 2023-02-13 | End: 2023-02-13

## 2023-02-13 RX ORDER — DEXTROSE MONOHYDRATE 25 G/50ML
25-50 INJECTION, SOLUTION INTRAVENOUS
Status: DISCONTINUED | OUTPATIENT
Start: 2023-02-13 | End: 2023-02-13

## 2023-02-13 RX ORDER — OXYCODONE HYDROCHLORIDE 5 MG/1
5 TABLET ORAL
Status: COMPLETED | OUTPATIENT
Start: 2023-02-13 | End: 2023-02-13

## 2023-02-13 RX ORDER — NICOTINE POLACRILEX 4 MG
15-30 LOZENGE BUCCAL
Status: DISCONTINUED | OUTPATIENT
Start: 2023-02-13 | End: 2023-02-13

## 2023-02-13 RX ORDER — CALCIUM GLUCONATE 20 MG/ML
1 INJECTION, SOLUTION INTRAVENOUS ONCE
Status: COMPLETED | OUTPATIENT
Start: 2023-02-13 | End: 2023-02-13

## 2023-02-13 RX ORDER — DEXTROSE MONOHYDRATE 25 G/50ML
25 INJECTION, SOLUTION INTRAVENOUS ONCE
Status: DISCONTINUED | OUTPATIENT
Start: 2023-02-13 | End: 2023-02-14

## 2023-02-13 RX ORDER — OXYCODONE HYDROCHLORIDE 5 MG/1
5 TABLET ORAL EVERY 12 HOURS PRN
Status: DISCONTINUED | OUTPATIENT
Start: 2023-02-13 | End: 2023-02-17

## 2023-02-13 RX ORDER — FUROSEMIDE 10 MG/ML
40 INJECTION INTRAMUSCULAR; INTRAVENOUS ONCE
Status: COMPLETED | OUTPATIENT
Start: 2023-02-13 | End: 2023-02-13

## 2023-02-13 RX ORDER — ALBUMIN (HUMAN) 12.5 G/50ML
25 SOLUTION INTRAVENOUS ONCE
Status: COMPLETED | OUTPATIENT
Start: 2023-02-13 | End: 2023-02-13

## 2023-02-13 RX ORDER — OXYCODONE HYDROCHLORIDE 5 MG/1
5 TABLET ORAL EVERY 12 HOURS PRN
Status: ON HOLD | COMMUNITY
End: 2023-03-17

## 2023-02-13 RX ORDER — FLUOXETINE 40 MG/1
40 CAPSULE ORAL AT BEDTIME
Status: ON HOLD | COMMUNITY
End: 2023-03-17

## 2023-02-13 RX ORDER — ASPIRIN 325 MG
2 TABLET ORAL DAILY
COMMUNITY
End: 2023-04-11

## 2023-02-13 RX ADMIN — HUMAN INSULIN 5.5 UNITS/HR: 100 INJECTION, SOLUTION SUBCUTANEOUS at 15:56

## 2023-02-13 RX ADMIN — FLUOXETINE 60 MG: 20 CAPSULE ORAL at 22:05

## 2023-02-13 RX ADMIN — LACTULOSE 20 G: 10 POWDER, FOR SOLUTION ORAL at 20:25

## 2023-02-13 RX ADMIN — ACETAMINOPHEN 650 MG: 160 SOLUTION ORAL at 09:04

## 2023-02-13 RX ADMIN — LACTULOSE 20 G: 10 POWDER, FOR SOLUTION ORAL at 09:05

## 2023-02-13 RX ADMIN — OCTREOTIDE ACETATE 50 MCG/HR: 200 INJECTION, SOLUTION INTRAVENOUS; SUBCUTANEOUS at 04:12

## 2023-02-13 RX ADMIN — CALCIUM GLUCONATE 1 G: 20 INJECTION, SOLUTION INTRAVENOUS at 09:05

## 2023-02-13 RX ADMIN — SODIUM CHLORIDE 1000 ML: 9 INJECTION, SOLUTION INTRAVENOUS at 17:18

## 2023-02-13 RX ADMIN — FOLIC ACID 1 MG: 1 TABLET ORAL at 08:48

## 2023-02-13 RX ADMIN — PANTOPRAZOLE SODIUM 40 MG: 40 INJECTION, POWDER, FOR SOLUTION INTRAVENOUS at 08:47

## 2023-02-13 RX ADMIN — OCTREOTIDE ACETATE 50 MCG/HR: 200 INJECTION, SOLUTION INTRAVENOUS; SUBCUTANEOUS at 20:25

## 2023-02-13 RX ADMIN — Medication 1 MG: at 00:13

## 2023-02-13 RX ADMIN — INSULIN ASPART 6 UNITS: 100 INJECTION, SOLUTION INTRAVENOUS; SUBCUTANEOUS at 09:31

## 2023-02-13 RX ADMIN — THIAMINE HCL TAB 100 MG 100 MG: 100 TAB at 08:48

## 2023-02-13 RX ADMIN — ALBUMIN HUMAN 25 G: 0.25 SOLUTION INTRAVENOUS at 13:16

## 2023-02-13 RX ADMIN — THERA TABS 1 TABLET: TAB at 08:48

## 2023-02-13 RX ADMIN — INSULIN ASPART 5 UNITS: 100 INJECTION, SOLUTION INTRAVENOUS; SUBCUTANEOUS at 04:22

## 2023-02-13 RX ADMIN — INSULIN ASPART 2 UNITS: 100 INJECTION, SOLUTION INTRAVENOUS; SUBCUTANEOUS at 00:12

## 2023-02-13 RX ADMIN — HUMAN INSULIN 6.65 UNITS: 100 INJECTION, SOLUTION SUBCUTANEOUS at 09:52

## 2023-02-13 RX ADMIN — RIFAXIMIN 550 MG: 550 TABLET ORAL at 20:25

## 2023-02-13 RX ADMIN — OXYCODONE HYDROCHLORIDE 5 MG: 5 TABLET ORAL at 22:35

## 2023-02-13 RX ADMIN — FUROSEMIDE 40 MG: 10 INJECTION, SOLUTION INTRAVENOUS at 09:04

## 2023-02-13 RX ADMIN — RIFAXIMIN 550 MG: 550 TABLET ORAL at 08:48

## 2023-02-13 RX ADMIN — PANTOPRAZOLE SODIUM 40 MG: 40 INJECTION, POWDER, FOR SOLUTION INTRAVENOUS at 20:25

## 2023-02-13 RX ADMIN — ACETAMINOPHEN 650 MG: 160 SOLUTION ORAL at 02:00

## 2023-02-13 RX ADMIN — ACETAMINOPHEN 650 MG: 160 SOLUTION ORAL at 17:15

## 2023-02-13 RX ADMIN — CEFTRIAXONE SODIUM 1 G: 1 INJECTION, POWDER, FOR SOLUTION INTRAMUSCULAR; INTRAVENOUS at 19:44

## 2023-02-13 RX ADMIN — LACTULOSE 20 G: 10 POWDER, FOR SOLUTION ORAL at 16:23

## 2023-02-13 RX ADMIN — SODIUM CHLORIDE, POTASSIUM CHLORIDE, SODIUM LACTATE AND CALCIUM CHLORIDE 500 ML: 600; 310; 30; 20 INJECTION, SOLUTION INTRAVENOUS at 10:31

## 2023-02-13 ASSESSMENT — ACTIVITIES OF DAILY LIVING (ADL)
ADLS_ACUITY_SCORE: 37
EQUIPMENT_CURRENTLY_USED_AT_HOME: GLUCOMETER;SHOWER CHAIR
CHANGE_IN_FUNCTIONAL_STATUS_SINCE_ONSET_OF_CURRENT_ILLNESS/INJURY: YES
ADLS_ACUITY_SCORE: 45
TRANSFERRING: 1-->ASSISTANCE (EQUIPMENT/PERSON) NEEDED
DIFFICULTY_COMMUNICATING: NO
ADLS_ACUITY_SCORE: 45
CONCENTRATING,_REMEMBERING_OR_MAKING_DECISIONS_DIFFICULTY: YES
TRANSFERRING: 0-->ASSISTANCE NEEDED (DEVELOPMENTALLY APPROPRIATE)
DOING_ERRANDS_INDEPENDENTLY_DIFFICULTY: YES
ADLS_ACUITY_SCORE: 37
ADLS_ACUITY_SCORE: 37
WEAR_GLASSES_OR_BLIND: YES
TOILETING_ISSUES: NO
ADLS_ACUITY_SCORE: 45
ADLS_ACUITY_SCORE: 45
VISION_MANAGEMENT: GLASSES
ADLS_ACUITY_SCORE: 41
DIFFICULTY_EATING/SWALLOWING: NO
ADLS_ACUITY_SCORE: 45
DRESSING/BATHING_DIFFICULTY: NO
WALKING_OR_CLIMBING_STAIRS_DIFFICULTY: YES
FALL_HISTORY_WITHIN_LAST_SIX_MONTHS: NO
ADLS_ACUITY_SCORE: 41
ADLS_ACUITY_SCORE: 37
HEARING_DIFFICULTY_OR_DEAF: NO
ADLS_ACUITY_SCORE: 39
WALKING_OR_CLIMBING_STAIRS: AMBULATION DIFFICULTY, ASSISTANCE 1 PERSON

## 2023-02-13 NOTE — PROGRESS NOTES
"CLINICAL NUTRITION SERVICES - BRIEF NOTE  For last full RD assessment, see note dated 2/12/23    NEW FINDINGS   - RD consult received to evaluate for malnutrition. Initial RD assessment completed yesterday but at that time pt was planned to have a FT placed for enteral nutrition support, which never occurred. Rather, patient ended up having a NGT placed for lactulose administration rather than TFs. NGT has been removed today with improved mental status. TF consult was discontinued by primary team.    - Per GI team assessment note today - \"EGD showed oozing portal hypertensive gastropathy. No sign of active bleeding with stable hemoglobin and vitals. Hematochezia could be from old blood post upper GI bleeding. Patient also has history of rectal varicies, but more likely to have  blood from stool rather than current presentation of hematochezia. Will start on-selective b-blocker for PHG and also for  history of esophageal varices bleeding.\" and \"ensure adequate protein calories intake with protein supplement at least BID\".     - Patient remains on a clear liquid diet which is quite limiting. Will place order for clear liquid oral nutrition supplements (Ensure Clear and Gelatein); once diet advanced further will check with pt for preferences and likely modify supplement order to vanilla Ensure as noted pt enjoys taking these on occasion PTA.       INTERVENTIONS  Implementation  Medical food supplement therapy - Initiate clear liquid oral nutrition supplements; monitoring POC for diet advancement/tolerance.     Monitoring/Evaluation  Will continue to monitor and evaluate per protocol.    Charlie Hester, SYLVIA, LD, CNSC  6B RD pager: [phone]   6B work-room RD phone: *[phone]    Weekend/Holiday RD pager [phone]      "

## 2023-02-13 NOTE — PROGRESS NOTES
GASTROENTEROLOGY PROGRESS NOTE    Date: 02/13/2023     ASSESSMENT: 27 year old male with a medical history of Asperger's, T2DM, alcohol related cirrhosis c/b hepatic encephalopathy, history of esophageal varices bleeding (5/2022, 1/2023) and rectal varices s/p sclerotherapy 1/2023, ascites presenting with multiple episodes of hematemesis. Underwent EGD 2/11 showed oozing portal hypertensive gastropathy, and healed esophageal ulcer.    Decompensated cirrhosis with hepatic encephalopathy, h/o esophageal variceal bleed, HE, MELD-Na 28  Portal hypertensive gastropathy, bleeding, improved  Rectal varices, s/p sclerotherapy 1/2023  EGD showed oozing portal hypertensive gastropathy. No sign of active bleeding with stable hemoglobin and vitals. Hematochezia could be from old blood post upper GI bleeding. Patient also has history of rectal varicies, but more likely to have  blood from stool rather than current presentation of hematochezia. Will start on-selective b-blocker for PHG and also for  history of esophageal varices bleeding.    ASHISH  Likely from acute blood loss.     RECOMMENDATIONS:  - stop octreotide  - start carvedilol 6.25 mg po BID  - lactulose titrate to 3 BMs/day, and rifaximin  - ensure adequate protein calories intake with protein supplement at least BID  - PT/OT  - hold diuretics in the setting of ASHISH and starting coregs    Gastroenterology follow up recommendations: Dr. Wood scheduled for 4/18/23      Pt care plan discussed with Dr. Leventhal, GI staff physician.    Lita Vences MD  Gastroenterology Fellow  Division of Gastroenterology, Hepatology and Nutrition  [facility]  Page [id number]  _______________________________________________________________    Subjective: No hematemesis, no abdominal pain. No fever/chills. Has 10 BMs with blood mixed with stool yesterday.     Objective:  Blood pressure 137/78, pulse 99, temperature 98.3  F (36.8  C), temperature source Oral, resp.  rate 16, weight 66.5 kg (146 lb 9.7 oz), SpO2 100 %.    Gen: A&Ox3, NAD  HEENT: sclera icteric  CV: RRR  Lungs: breathing comfortably on room air  Abd: soft, nontender, nondistended  Skin: no jaundice, no stigmata of chronic liver disease  MS: decreased muscle mass for age  Neuro: non focal, no asterixis     LABS:  BMP  Recent Labs   Lab 02/13/23  1238 02/13/23  1141 02/13/23  1133 02/13/23  1055 02/13/23  1028 02/13/23  0916 02/13/23  0913 02/13/23  0625 02/13/23  0418 02/13/23  0040 02/12/23  0811 02/12/23  0541 02/12/23  0503 02/12/23  0219 02/11/23  2259 02/11/23  1852   NA  --   --   --   --   --   --   --  131*  --   --   --  131*  --  135  --  133*   POTASSIUM  --  4.7  --   --   --  5.8*  --  5.7*  --  5.2   < > 5.9*  --  4.3   < > 5.5*   CHLORIDE  --   --   --   --   --   --   --  97*  --   --   --  97*  --   --   --  96*   SARTHAK  --   --   --   --   --   --   --  9.2  --   --   --  9.3  --   --   --  9.9   CO2  --   --   --   --   --   --   --  17*  --   --   --  22  --   --   --  27   BUN  --   --   --   --   --   --   --  54.0*  --   --   --  37.3*  --   --   --  33*   CR  --   --   --   --   --   --   --  1.30*  --   --   --  1.22*  --   --   --  1.18   *  --  386* 388* 426*  --    < > 353*   < >  --    < > 261*   < > 186*   < > 216*    < > = values in this interval not displayed.     CBC  Recent Labs   Lab 02/13/23  0730 02/12/23  1103 02/12/23  0541 02/11/23  2259 02/11/23  1852   WBC 8.1  --  7.9  --  11.1*   RBC 2.61*  --  2.26*  --  2.68*   HGB 8.6*   < > 7.6*   < > 9.1*   HCT 25.8*  --  23.2*  --  27.4*   MCV 99  --  103*  --  102*   MCH 33.0  --  33.6*  --  34.0*   MCHC 33.3  --  32.8  --  33.2   RDW 18.9*  --  16.9*  --  17.0*   *  --  132*  --  171    < > = values in this interval not displayed.     INR  Recent Labs   Lab 02/13/23  0730 02/12/23  0541 02/11/23  1852   INR 1.94* 1.55* 1.48*     LFTs  Recent Labs   Lab 02/13/23  0625 02/12/23  0541 02/11/23  1852   ALKPHOS 98 113 157*    AST  --  74* 71*   ALT 43 42 54*   BILITOTAL 9.3* 8.5* 10.7*   PROTTOTAL 7.0 6.9 8.4*   ALBUMIN 3.2* 3.4* 3.6      PANC  Recent Labs   Lab 02/11/23  1852   LIPASE <9   MELD-Na score: 28 at 2/13/2023  7:30 AM  MELD score: 25 at 2/13/2023  7:30 AM  Calculated from:  Serum Creatinine: 1.30 mg/dL at 2/13/2023  6:25 AM  Serum Sodium: 131 mmol/L at 2/13/2023  6:25 AM  Total Bilirubin: 9.3 mg/dL at 2/13/2023  6:25 AM  INR(ratio): 1.94 at 2/13/2023  7:30 AM  Age: 27 years      IMAGING:  Reviewed.

## 2023-02-13 NOTE — PLAN OF CARE
Goal Outcome Evaluation:  Neuro:  A&Ox4. Noted with tremors on bilateral upper extremities.   Cardiac: SR-ST. VSS.   Respiratory: Sating > 95%  on RA  GI/: External cath, with adequate urine output. Furosemide given stat. BM X4, watery brownish maroon.   Diet/appetite: NPO, with NGT in place. Old clots of blood noted on mouth and nose. On blood sugar monitoring.   Activity:  Bedfast, turned to sides with assist.   Pain: At acceptable level on current regimen. Oxycodone x1.  Skin: Perianal redness noted.   LDA's: With 2 PIV lines. Octreotide drip at 50mcg/hr.     Plan: Continue with POC. Notify primary team with changes.

## 2023-02-13 NOTE — PLAN OF CARE
Overview: Patient vitally stable with pain managed. Improved mentation from previous days with Noble scores of 0 on primary and secondary assessments. For this reason, NG removed and clear liquid diet started. Blood sugars, lactic acid and potassium trending high. RRT following after Code Sepsis at 1300 and albumin started, LR boluses given, Calcium gluconate administered and lactulose continued. At 1430, insulin infusion orders in and pending medication from pharmacy for initiation. Mom at bedside for most of day and attentive to patient - no psychosocial concerns at this time.    Neuro:  A&Ox4. BUE tremor(s) and hypotonic tone to LUE specifically  Cardiac: SR. VSS. Afebrile. Cardiac monitoring continued  Respiratory: Saturations > 90%  on RA. Continuous pulse oximetry remains.  GI/: External cath removed and patient voiding appropriately in bathroom. BM x2 with episode of incontinence.  Diet/appetite: Advanced to clear liquid diet. Carbohydrate counts initiated at 1430 as well as Ensure supplementation. Old clots of blood noted on mouth and nose with oral cares being performed (patient refuses brushing teeth or using mouthwash). Trending blood sugars frequently r/t protocol for potassium shifting  Activity:  Up with 2 and gait belt. Consider walker use. PT/OT consulted at 1430.  Pain: At acceptable level on current regimen.  Skin: Perianal redness noted and barrier cream applied  LDA's: 1 PIV infusing     Provider notification(s):  Sent to: Med student myriam Hand at 1101  From: 6B RN [id number]  Paging to clarify double LR bolus orders; also blood sugars remaining high (300s-400s) with plan to remove NG and start PO intake. Do you want to order more insulin? Thanks.    Pharmacy secure chat at 1436  Ready on floor to initiate continuous insulin infusion. Send up as soon as you can, please. Thank you!      Problem: Electrolyte Imbalance  Goal: Electrolyte Imbalance: Plan of Care  Outcome: Not  "Progressing  Intervention: Monitor and Manage Electrolyte Imbalance  Recent Flowsheet Documentation  Taken 2/13/2023 1245 by Margret Schafer, RN  Fluid/Electrolyte Management:    electrolyte-binding therapy initiated    fluids provided    intravenous fluids adjusted  Taken 2/13/2023 0800 by Margret Schafer, RN  Fluid/Electrolyte Management:    electrolyte-binding therapy initiated    fluids provided    intravenous fluids adjusted     Problem: Plan of Care - These are the overarching goals to be used throughout the patient stay.    Goal: Plan of Care Review  Description: The Plan of Care Review/Shift note should be completed every shift.  The Outcome Evaluation is a brief statement about your assessment that the patient is improving, declining, or no change.  This information will be displayed automatically on your shift note.  Outcome: Progressing  Flowsheets (Taken 2/13/2023 1400)  Outcome Evaluation: NG tube removed and protocol(s) being followed to stabilize Potassium and Lactic acid lab values. Patient diet advanced to clear liquids and tolerating well. No hematemesis, although still coughing up blood and having bloody stools.  Plan of Care Reviewed With:    patient    parent  Overall Patient Progress: improving  Goal: Patient-Specific Goal (Individualized)  Description: You can add care plan individualizations to a care plan. Examples of Individualization might be:  \"Parent requests to be called daily at 9am for status\", \"I have a hard time hearing out of my right ear\", or \"Do not touch me to wake me up as it startles me\".  Outcome: Progressing  Flowsheets (Taken 2/13/2023 1400)  Individualized Care Needs: Lab stabilization  Anxieties, Fears or Concerns: Was anxious about NG removal, but reassurance given and procedure performed  Patient/Family-Specific Goals (Include Timeframe): Take a nap, eat food  Goal: Absence of Hospital-Acquired Illness or Injury  Outcome: Progressing  Intervention: Identify and Manage " Fall Risk  Recent Flowsheet Documentation  Taken 2/13/2023 1245 by Margret Schafer RN  Safety Promotion/Fall Prevention:    assistive device/personal items within reach    lighting adjusted    increase visualization of patient    treat underlying cause    safety round/check completed  Taken 2/13/2023 0800 by Margret Schafer RN  Safety Promotion/Fall Prevention:    assistive device/personal items within reach    lighting adjusted    increase visualization of patient    treat underlying cause    safety round/check completed  Intervention: Prevent Skin Injury  Recent Flowsheet Documentation  Taken 2/13/2023 1245 by Margret Schafer RN  Body Position:    supine, head elevated    weight shifting  Taken 2/13/2023 0800 by Margret Schafer RN  Body Position: supine, head elevated  Intervention: Prevent and Manage VTE (Venous Thromboembolism) Risk  Recent Flowsheet Documentation  Taken 2/13/2023 1245 by Margret Schafer RN  VTE Prevention/Management: SCDs (sequential compression devices) off  Taken 2/13/2023 0800 by Margret Schafer RN  VTE Prevention/Management: SCDs (sequential compression devices) off  Goal: Optimal Comfort and Wellbeing  Outcome: Progressing  Intervention: Monitor Pain and Promote Comfort  Recent Flowsheet Documentation  Taken 2/13/2023 1245 by Margret Schafer RN  Pain Management Interventions: repositioned  Taken 2/13/2023 0949 by Margret Schafer RN  Pain Management Interventions:    medication (see MAR)    pain management plan reviewed with patient/caregiver  Taken 2/13/2023 0800 by Margret Schafer RN  Pain Management Interventions:    rest    repositioned    pillow support provided  Intervention: Provide Person-Centered Care  Recent Flowsheet Documentation  Taken 2/13/2023 1245 by Margret Schafer RN  Trust Relationship/Rapport:    care explained    choices provided    thoughts/feelings acknowledged    emotional support provided    questions answered    questions encouraged  Taken 2/13/2023 0800  by Margret Schafer RN  Trust Relationship/Rapport:    care explained    choices provided    thoughts/feelings acknowledged    emotional support provided    questions answered    questions encouraged  Goal: Readiness for Transition of Care  Outcome: Progressing     Problem: Malnutrition  Goal: Improved Nutritional Intake  Outcome: Progressing  Intervention: Promote and Optimize Oral Intake  Recent Flowsheet Documentation  Taken 2/13/2023 1245 by Margret Schafer RN  Nutrition Interventions:    referred to dietitian    diet advanced  Taken 2/13/2023 0800 by Margret Schafer RN  Nutrition Interventions: referred to dietitian     Problem: Liver Failure  Goal: Optimal Coping with Liver Failure  Outcome: Progressing  Intervention: Support Response to Liver Disease  Recent Flowsheet Documentation  Taken 2/13/2023 1245 by Margret Schafer RN  Family/Support System Care: self-care encouraged  Taken 2/13/2023 0800 by Margret Schafer RN  Family/Support System Care:    self-care encouraged    caregiver stress acknowledged  Goal: Fluid and Electrolyte Balance  Outcome: Progressing  Intervention: Monitor and Manage Fluid and Electrolyte Balance  Recent Flowsheet Documentation  Taken 2/13/2023 1245 by Margret Schafer RN  Fluid/Electrolyte Management:    electrolyte-binding therapy initiated    fluids provided    intravenous fluids adjusted  Taken 2/13/2023 0800 by Margret Schafer RN  Fluid/Electrolyte Management:    electrolyte-binding therapy initiated    fluids provided    intravenous fluids adjusted  Goal: Optimal Gastrointestinal Function  Outcome: Progressing  Intervention: Monitor and Support Gastrointestinal Function  Recent Flowsheet Documentation  Taken 2/13/2023 1245 by Margret Schafer RN  Body Position:    supine, head elevated    weight shifting  Taken 2/13/2023 0800 by Margret Schafer RN  Body Position: supine, head elevated  Goal: Optimal Coagulation Function  Outcome: Progressing  Goal: Absence of Infection  Signs and Symptoms  Outcome: Progressing  Goal: Optimal Neurologic Function  Outcome: Progressing  Goal: Improved Oral Intake  Outcome: Progressing  Goal: Optimal Pain Control  Outcome: Progressing  Intervention: Prevent or Manage Pain  Recent Flowsheet Documentation  Taken 2/13/2023 1245 by Margret Schafer RN  Pain Management Interventions: repositioned  Taken 2/13/2023 0949 by Margret Schafer RN  Pain Management Interventions:    medication (see MAR)    pain management plan reviewed with patient/caregiver  Taken 2/13/2023 0800 by Margret Schafer RN  Pain Management Interventions:    rest    repositioned    pillow support provided  Goal: Optimize Renal Function  Outcome: Progressing  Goal: Effective Oxygenation and Ventilation  Outcome: Progressing  Intervention: Promote Airway Secretion Clearance  Recent Flowsheet Documentation  Taken 2/13/2023 1245 by Margret Schafer RN  Cough And Deep Breathing: done with encouragement  Activity Management: back to bed  Taken 2/13/2023 0800 by Margret Schafer RN  Cough And Deep Breathing: done with encouragement  Activity Management: activity adjusted per tolerance  Intervention: Optimize Oxygenation and Ventilation  Recent Flowsheet Documentation  Taken 2/13/2023 1245 by Margret Schafer RN  Head of Bed (HOB) Positioning: HOB at 45 degrees  Taken 2/13/2023 0800 by Margret Schafer RN  Head of Bed (HOB) Positioning: HOB at 20-30 degrees   Goal Outcome Evaluation:      Plan of Care Reviewed With: patient, parent    Overall Patient Progress: improvingOverall Patient Progress: improving    Outcome Evaluation: NG tube removed and protocol(s) being followed to stabilize Potassium and Lactic acid lab values. Patient diet advanced to clear liquids and tolerating well. No hematemesis, although still coughing up blood and having bloody stools.

## 2023-02-13 NOTE — PLAN OF CARE
Shift Highlights:    Pt lethargic, responsive only to repeated stimulation and painful stimuli at start of shift. Improved throughout the day, pt alert and oriented x3 by 1730. Now able to respond appropriately and follow commands.     No hematemesis this shift. Multiple blood clots suctioned out of oral cavity, unsure of source.     K+ 6.3, shifted --> K+ 5.4 at 1100 and 1400 recheck. 1800 recheck awaiting lab collection.     Hgb 6.9, pt received 1 unit PRBC.     NG placed bedside by resource RN - unable to place in radiology, per primary team, discussed with GI and ok to attempt bedside placement despite history of recent esophageal varices. Some bleeding to L nare with insertion.     [place] protocol initiated, mentation improving with 2 doses lactulose. At 1800, pt began having multiple large, loose dark maroon-colored stool. Cross-cover MD updated.     Pt now c/o generalized pain, states he states oxycodone at home and would like for this to be resumed.     See flowsheet for full assessment data.     Goal Outcome Evaluation:    Plan of Care Reviewed With: patient, parent    Overall Patient Progress: improving    Outcome Evaluation: Feeding tube placed. Mentation improving with PRN lactulose. Hyperkalemic, managed with K+ shifting. Received 1 unit PRBC for Hgb 6.9.

## 2023-02-13 NOTE — PROGRESS NOTES
[facility]    Progress Note - Medicine Service, MAROON TEAM 2       Date of Admission:  2/11/2023    Assessment & Plan   Dillon Salter is a 27 year old male with history of Asperger's, alcoholic cirrhosis c/b esophageal varices and hepatic encephalopathy currently undergoing liver transplant evaluation at [facility], DM II who was directly admitted from [facility] for hematemesis, acute on chronic blood loss anemia, decompensated cirrhosis and hepatic encephalopathy. Hemodynamically stable with improving encephalopathy.    Changes Today:  - mental status much improved today  - removing NGT today, clear liquid diet  - Per GI, discontinue octreotide  - continuing lactulose, rifaximin, PPI, ceftriaxone  - recheck Hgb in the evening  - Hyperkalemia, shifted again this morning  - IVF bolus with albumin and LR for LA 5.3 (Hgb stable)  - Recheck lactic acid at 1500, if still elevated, pursue infectious workup  - increasing glargine to 20 BID and high dose sliding scale    - start insulin gtt this afternoon for acute control, anticipate titration off tomorrow  - Pt requesting PTA oxycodone, will attempt to hold off for now/discuss further     Acute on chronic blood loss anemia  Hematemesis  Hx of esophageal varices   Portal hypertensive gastropathy  Esophageal ulceration  Recurrent rectal varices  2-3 episodes hematemesis prior to ED of ~400 mL, ~325 mL at ER, 250 mL anu hematemesis since arrival. HDS. Given 1 unit pRBCs on 2/12 for Hgb of 6.9. INR 1.48. Esophageal varices recently banded 1/23/23. 2 20 gauge IVs in place. EGD 2/12 AM: diffuse oozing likely 2/2 portal hypertensive gastropathy, no bleeding seen from recent banding, nonbleeding esophageal ulcers seen. No continued hematemesis, hemoglobin stable as of 2/13.  - telemetry   - trend hgb q6h and transfuse for <7  - IV PPI BID  - ceftriaxone 1g qday  - octreotide discontinued 2/13  - GI following    Hepatic  encephalopathy, improving  Decompensated alcohol related cirrhosis c/b esophageal varices   Portal HTN and splenomegaly  Coagulopathy due to liver disease  Thrombocytopenia due to liver disease, present on admission  Follows with Dr. Wood. MELD-Na 24 today  Etiology: Alcohol, diagnosed 4/2022  HE: lactulose per [facility] protocol (holding PTA scheduled currently), continue rifaximin  EV/GV: esophageal varices s/p banding 1/23/23, stable on EGD 2/12 without evidence of bleeding; start carvedilol BID when able (holding for now given elevated lactate)  Coagulopathy: INR 1.48,  (suspect 171 on admission was hemoconcentration)  Ascites: None per RUQ 2/12, hold PTA bumex 1 mg and spironolactone given ASHISH  SBP:  no h/o   HCC: last CT 1/2023, no evidence of  Liver transplant candidacy: Last drink 4/2022, follows with Dr. Wood. Ongoing liver transplant eval outpatient including Chemical dependency assessment and programming/Mental health referral, ongoing. CT angiogram, scheduled 4/2023. PFTs given history of tobacco use  - GI consulted, appreciate recs  - daily MELD labs  - NG placement for lactulose administration per [place] protocol 2/12, NG tube removed 2/13 as patient's mental status improved and able to take meds PO  - RUQ US + doppler w/o ascites seen  - <2g Na, <2L water, >1.5 g/kg/day protein diet when diet advanced  - Bcx NGTD    MELD-Na score: 28 at 2/13/2023  7:30 AM  MELD score: 25 at 2/13/2023  7:30 AM  Calculated from:  Serum Creatinine: 1.30 mg/dL at 2/13/2023  6:25 AM  Serum Sodium: 131 mmol/L at 2/13/2023  6:25 AM  Total Bilirubin: 9.3 mg/dL at 2/13/2023  6:25 AM  INR(ratio): 1.94 at 2/13/2023  7:30 AM  Age: 27 years      Lactic acidosis  Lactate 2.8 on admission. Likely 2/2 hypovolemia in the setting of acute GIB. Given 500 ml w/ K shifting on 2/12, then 1u pRBC  - trend, give additional fluids/blood as needed     Severe hyperkalemia  K 5.9 -> 6.3 -> 6.2 -> 5.8  Shifted x1 overnight and 2/12  with insulin/D50, sodium bicarb, and lasix 40 mg IV x1.  - shifted again 2/13 with insulin and lasix  - Lactulose to aid with BM's, as noted above  - If not responsive to further shifting, then d/w renal     ASHISH  Hyponatremia  Hyperphosphatemia  Hypomagnesemia  Suspect pre-renal - hypovolemia secondary to blood loss and poor PO intake for past days and continued diuretic use. Also c/f HRS vs ATN. Cr 1.22 (baseline 0.8) on admission.  - strict I/Os  - trend BMPs  - replete fluid losses with IVF and blood products   - holding PTA diuretics      Leukocytosis, resolved  Suspect reactive in setting of UGIB. No focal exam or history otherwise. HDS.   - on ceftriaxone for UGIB for SBP ppx as above  - trend   - Bcx NGTD     CHRONIC & STABLE PROBLEMS     Hyperglycemia, worsened  DM II - hgb A1c 5.6%  - increased glargine to 20 units BID (same as home dose)  - high dose sliding scale insulin  - hypoglycemia protocol   - start insulin gtt this afternoon for acute control, anticipate titration off tomorrow     MDD/anxiety   Autism spectrum   Mother is his caretaker, lives with parents.   - continue PTA fluoxetine  - previously his mother, Leticia, has been able to stay with patient overnight which helps with mood/anxiety. This was discussed with charge RN    Chronic pain, musculoskeletal  - Holding PTA oxycodone (recently started by PCP to help w/ pain and promote working w/ PT) - concern about causing oversedation  - Gabapentin not a good option - previously caused excessive drowsiness  - Continue Tylenol, max 2 Gm daily  - Will discuss other agents    Malnutrition:    - Level of malnutrition: Severe   - nutrition plan to be clarified once able to tolerate PO       Diet: Clear Liquid Diet    DVT Prophylaxis: Pneumatic compression device  Negron Catheter: Not present  Fluids: s/p 500 ml bolus 2/12 AM  Lines: None     Cardiac Monitoring: ACTIVE order. Indication: Electrolyte Imbalance (24 hours)- Magnesium <1.3 mg/ml; Potassium <  =2.8 or > 5.5 mg/ml  Code Status: Full Code      Clinically Significant Risk Factors        # Hyperkalemia: Highest K = 6.3 mmol/L in last 2 days, will monitor as appropriate    # Hypercalcemia: corrected calcium is >10.1, will monitor as appropriate  # Hypomagnesemia: Lowest Mg = 1.6 mg/dL in last 2 days, will replace as needed   # Hypoalbuminemia: Lowest albumin = 3.2 g/dL at 2/13/2023  6:25 AM, will monitor as appropriate    # Acute Kidney Injury, unspecified: based on a >150% or 0.3 mg/dL increase in last creatinine compared to past 90 day average, will monitor renal function          # Severe Malnutrition: based on nutrition assessment, PRESENT ON ADMISSION       Disposition Plan      Expected Discharge Date: 02/16/2023      Destination: home with family  Discharge Comments: Not medically ready        The patient's care was discussed with the Attending Physician, Dr. Zapien.    Narcisa Willard, MS3  Medicine Service, [address]  Securely message with Vocera (more info)  Text page via [facility] Paging/Niftiy   See signed in provider for up to date coverage information         Resident/Fellow Attestation   I, Radha Granado MD, was present with the medical/CHRISTA student who participated in the service and in the documentation of the note.  I have verified the history and personally performed the physical exam and medical decision making.  I agree with the assessment and plan of care as documented in the note.      Radha Granado MD  PGY2  Date of Service (when I saw the patient): 02/13/23    ______________________________________________________________________    Interval History   Nursing notes reviewed. No acute events overnight. Patient feels better this morning, is alert and oriented x4. No further hematemesis. Patient's mother Leticia at bedside- notes Dillon has had about 10 BMs since admission. Stools were initially dark but per patient have been  getting more brown. Patient feels hungry this AM and would like NG tube out to eat. Denies any chest or abdominal pain, or shortness of breath. Notes chronic pain in lower half of body - requesting tylenol.      Physical Exam   Vital Signs: Temp: 98.3  F (36.8  C) Temp src: Oral BP: 137/78 Pulse: 99   Resp: 16 SpO2: 100 % O2 Device: None (Room air)    Weight: 146 lbs 9.69 oz    General Appearance: Awake, alert, NAD  Respiratory: CTAB, no wheezing, no increased WOB  Cardiovascular: Tachycardic, regular rhythm, no murmur, extremities warm and well perfused  GI: Soft, nontender, no fluid wave  Skin: No rash, some bruising on the lower extremities, no edema  Neuro: Oriented x3, no focal deficits.     Medical Decision Making      Please see A&P for additional details of medical decision making.      Data     I have personally reviewed the following data over the past 24 hrs:    8.1  \   8.6 (L)   / 130 (L)     131 (L) 97 (L) 54.0 (H) /  388 (H)   5.8 (H) 17 (L) 1.30 (H) \       ALT: 43 AST: N/A AP: 98 TBILI: 9.3 (H)   ALB: 3.2 (L) TOT PROTEIN: 7.0 LIPASE: N/A       Procal: N/A CRP: N/A Lactic Acid: 3.0 (H)       INR:  1.94 (H) PTT:  N/A   D-dimer:  N/A Fibrinogen:  N/A       Imaging results reviewed over the past 24 hrs:   Recent Results (from the past 24 hour(s))   XR Abdomen Port 1 View    Narrative    EXAM: XR ABDOMEN PORT 1 VIEW  2/12/2023 1:16 PM     HISTORY:  Verify small bowel feeding tube bedside placement       TECHNIQUE: Single frontal radiograph of the abdomen    COMPARISON:  CT abdomen and pelvis 1/25/2023    FINDINGS:   Probably be supine radiograph of the abdomen. Gastric tube tip and  sidehole projected over the proximal stomach. Nonobstructive bowel gas  pattern. No pneumatosis, portal venous gas.      Impression    IMPRESSION: Gastric tube tip and sidehole projecting over the proximal  stomach. No feeding tube identified..     I have personally reviewed the examination and initial interpretation  and I  agree with the findings.    DEMETRICE BLACK MD         SYSTEM ID:  [id number]     EGD 2/12/23:  Impression:            - Esophageal ulcers with no bleeding and no stigmata                          of recent bleeding.                          - Small (< 5 mm) esophageal varices.                          - Portal hypertensive gastropathy.                          - Normal examined duodenum.                          - Possible that he bled from post-banding ulcers, but                          no stigmata to make this highly likely. Diffuse oozing                          noted from portal hypertensive gastropathy was more                          likely source

## 2023-02-13 NOTE — CODE/RAPID RESPONSE
Rapid Response Team Note    Assessment   In assessment a rapid response was called on Dillon Salter due to lactic acidosis. This presentation is likely due to alcoholic cirrhosis and recent UGIB.    Dillon is a 26 yo M with a past medical history of Aspergers, alcoholic cirrhosis c/b EV and HE currently undergoing liver transplant evaluation, DMII who is admitted for acute blood loss anemia and decompensated cirrhosis.  Taken for urgent EGD early AM  which showed a non-bleeding esophageal ulcer in distal esophagus (likely at site of prior banding), diffuse portal hypertensive gastropathy w/ oozing in fundus, no intervention was completed.  RRT called for lactic acid of 5.3.  Primary MD team has already ordered an additional 500 mL of LR (in addition to 500 mL given previously) and albumin bolus.      Plan   -  IVFB with albumin and LR   - Recheck lactic acid at 1500, if still elevated, pursue infectious workup.  -  The Internal Medicine primary team was able to be reached and they are in agreement with the above plan.  -  Disposition: The patient will remain on the current unit. We will continue to monitor this patient closely.  -  Reassessment and plan follow-up will be performed by the primary team    Rupali Vidal NP  [facility] RRT [facility] Job Code Contact #[phone]  [facility] Paging/Directory    Hospital Course   Brief Summary of events leading to rapid response:   See above.     Admission Diagnosis:   Hematemesis [K92.0]    Physical Exam   Temp: 98.3  F (36.8  C) Temp  Min: 98.3  F (36.8  C)  Max: 98.8  F (37.1  C)  Resp: 16 Resp  Min: 15  Max: 16  SpO2: 100 % SpO2  Min: 97 %  Max: 100 %  Pulse: 99 Pulse  Min: 75  Max: 104    No data recorded  BP: 137/78 Systolic (24hrs), Av , Min:106 , Max:141   Diastolic (24hrs), Av, Min:57, Max:86     I/Os: I/O last 3 completed shifts:  In: 2433.83 [I.V.:523.83; NG/GT:610; IV Piggyback:1000]  Out: 2100 [Urine:2100]     Exam:   General: chronically ill  appearing  Mental Status: baseline mental status.  CV:  +Systolic murmur, RRR.  Pulm:  NOrmal effort on RA, no wheezes, rhonchi.   GI: Abdomen soft, NTND.  Hyperactive bowel sounds throughout  Skin: Jaundice, no open wounds or rashes on visualized skin.   Neuro: A+O x 3, MCKEON    Significant Results and Procedures   Lactic Acid:   Recent Labs   Lab Test 02/13/23  1141 02/13/23  0730 02/12/23  1936   LACT 5.3* 3.0* 3.5*     CBC:   Recent Labs   Lab Test 02/13/23  0730 02/13/23  0040 02/12/23  1936 02/12/23  1103 02/12/23  0541 02/11/23  2259 02/11/23  1852   WBC 8.1  --   --   --  7.9  --  11.1*   HGB 8.6* 8.9* 7.7*   < > 7.6*   < > 9.1*   HCT 25.8*  --   --   --  23.2*  --  27.4*   *  --   --   --  132*  --  171    < > = values in this interval not displayed.        Sepsis Evaluation   The patient is not known to have an infection.  NO EVIDENCE OF SEPSIS at this time.  Vital sign, physical exam, and lab findings are due to alcoholic cirrhosis and UGIB.

## 2023-02-13 NOTE — PHARMACY-ADMISSION MEDICATION HISTORY
Admission Medication History Completed by Pharmacy    See [facility] Admission Navigator for allergy information, preferred outpatient pharmacy, prior to admission medications and immunization status.     Medication History Sources:     External med dispense report    Mother (Leticia) and patient interview    Changes made to PTA medication list (reason):    Added:   o Oxycodone - recently prescribed 2/6/23  o Fluoxetine 40 mg capsules - takes 1 of these with 20mg capsule at bedtime    Deleted:   o Ibuprofen - no longer takes    Changed:   o Spironolactone from at bedtime to daily    Additional Information:    Patient recently completed course of ciprofloxacin for SBP prophylax (prescribed 3 days on 1/29/23).    Patient reports only needing to take 2 doses of lactulose packets in a day to achieve at least 4 bowel movements per day.     Medication Sig Last Dose   acetaminophen (TYLENOL) 500 MG tablet Take 500 mg by mouth daily as needed for pain Past Month   FLUoxetine (PROZAC) 40 MG capsule Take 40 mg by mouth At Bedtime Take in addition to 20 mg capsule for a total of 60 mg at bedtime. 2/10/2023 at    gabapentin (NEURONTIN) 100 MG capsule Take 100 mg by mouth daily as needed More than a month   insulin aspart (NOVOLOG FLEXPEN) 100 UNIT/ML pen 1 unit per 10 grams of carb, plus additional units based on following scale   For Pre-Meal  - 164 give 1 unit. For Pre-Meal  - 189 give 2 units. For Pre-Meal  - 214 give 3 units. For Pre-Meal  - 239 give 4 units. For Pre-Meal  - 264 give 5 units. For Pre-Meal  - 289 give 6 units. For Pre-Meal  - 314 give 7 units. For Pre-Meal  - 339 give 8 units. For Pre-Meal  - 364 give 9 units.  For Pre-Meal BG greater than or equal to 365 give 10 units  Patient taking differently: 1-10 Units 1 unit per 10 grams of carb, plus additional units based on following scale   For Pre-Meal  - 164 give 1 unit. For Pre-Meal  - 189 give 2  units. For Pre-Meal  - 214 give 3 units. For Pre-Meal  - 239 give 4 units. For Pre-Meal  - 264 give 5 units. For Pre-Meal  - 289 give 6 units. For Pre-Meal  - 314 give 7 units. For Pre-Meal  - 339 give 8 units. For Pre-Meal  - 364 give 9 units.  For Pre-Meal BG greater than or equal to 365 give 10 units Past Month   Multiple Vitamins-Minerals (MULTIVITAMIN GUMMIES ADULT) CHEW Take 2 each by mouth daily More than a month   oxyCODONE (ROXICODONE) 5 MG tablet Take 5 mg by mouth every 12 hours as needed for severe pain (7-10) 2/10/2023 at PM   bumetanide (BUMEX) 1 MG tablet Take 1 tablet (1 mg) by mouth daily 2/10/2023 at AM   FLUoxetine (PROZAC) 20 MG capsule Take 20 mg by mouth At Bedtime Take in addition to 40 mg capsule for a total of 60 mg at bedtime. 2/10/2023 at HS   folic acid (FOLVITE) 1 MG tablet Take 1 tablet (1 mg) by mouth daily 2/10/2023 at AM   insulin glargine (LANTUS PEN) 100 UNIT/ML pen Inject 20 Units Subcutaneous 2 times daily 2/11/2023 at AM   lactulose (CEPHULAC) 10 GM packet Take 2 packets (20 g) by mouth 3 times daily 2/10/2023 at PM   pantoprazole (PROTONIX) 40 MG EC tablet Take 1 tablet (40 mg) by mouth daily 2/10/2023 at AM   rifaximin (XIFAXAN) 550 MG TABS tablet Take 1 tablet (550 mg) by mouth 2 times daily 2/10/2023 at PM   spironolactone (ALDACTONE) 100 MG tablet Take 100 mg by mouth daily 2/10/2023 at AM   thiamine (B-1) 100 MG tablet Take 1 tablet (100 mg) by mouth in the morning. 2/10/2023 at AM       Date completed: 02/13/23    Medication history completed by: Maria Esther Scales, PharmD

## 2023-02-14 ENCOUNTER — APPOINTMENT (OUTPATIENT)
Dept: OCCUPATIONAL THERAPY | Facility: CLINIC | Age: 28
DRG: 005 | End: 2023-02-14
Payer: COMMERCIAL

## 2023-02-14 LAB
ALBUMIN SERPL BCG-MCNC: 3.7 G/DL (ref 3.5–5.2)
ALP SERPL-CCNC: 99 U/L (ref 40–129)
ALT SERPL W P-5'-P-CCNC: 40 U/L (ref 10–50)
ANION GAP SERPL CALCULATED.3IONS-SCNC: 9 MMOL/L (ref 7–15)
AST SERPL W P-5'-P-CCNC: 49 U/L (ref 10–50)
ATRIAL RATE - MUSE: 103 BPM
BILIRUB SERPL-MCNC: 7.9 MG/DL
BUN SERPL-MCNC: 40.1 MG/DL (ref 6–20)
CALCIUM SERPL-MCNC: 9.5 MG/DL (ref 8.6–10)
CHLORIDE SERPL-SCNC: 96 MMOL/L (ref 98–107)
CREAT SERPL-MCNC: 0.89 MG/DL (ref 0.67–1.17)
DEPRECATED HCO3 PLAS-SCNC: 24 MMOL/L (ref 22–29)
DIASTOLIC BLOOD PRESSURE - MUSE: NORMAL MMHG
ERYTHROCYTE [DISTWIDTH] IN BLOOD BY AUTOMATED COUNT: 18 % (ref 10–15)
GFR SERPL CREATININE-BSD FRML MDRD: >90 ML/MIN/1.73M2
GLUCOSE BLDC GLUCOMTR-MCNC: 100 MG/DL (ref 70–99)
GLUCOSE BLDC GLUCOMTR-MCNC: 112 MG/DL (ref 70–99)
GLUCOSE BLDC GLUCOMTR-MCNC: 139 MG/DL (ref 70–99)
GLUCOSE BLDC GLUCOMTR-MCNC: 187 MG/DL (ref 70–99)
GLUCOSE BLDC GLUCOMTR-MCNC: 207 MG/DL (ref 70–99)
GLUCOSE BLDC GLUCOMTR-MCNC: 208 MG/DL (ref 70–99)
GLUCOSE BLDC GLUCOMTR-MCNC: 246 MG/DL (ref 70–99)
GLUCOSE BLDC GLUCOMTR-MCNC: 294 MG/DL (ref 70–99)
GLUCOSE BLDC GLUCOMTR-MCNC: 349 MG/DL (ref 70–99)
GLUCOSE BLDC GLUCOMTR-MCNC: 373 MG/DL (ref 70–99)
GLUCOSE BLDC GLUCOMTR-MCNC: 474 MG/DL (ref 70–99)
GLUCOSE BLDC GLUCOMTR-MCNC: 475 MG/DL (ref 70–99)
GLUCOSE BLDC GLUCOMTR-MCNC: 54 MG/DL (ref 70–99)
GLUCOSE BLDC GLUCOMTR-MCNC: 66 MG/DL (ref 70–99)
GLUCOSE BLDC GLUCOMTR-MCNC: 95 MG/DL (ref 70–99)
GLUCOSE SERPL-MCNC: 276 MG/DL (ref 70–99)
HCT VFR BLD AUTO: 25.1 % (ref 40–53)
HGB BLD-MCNC: 8.1 G/DL (ref 13.3–17.7)
INR PPP: 2.29 (ref 0.85–1.15)
INTERPRETATION ECG - MUSE: NORMAL
MCH RBC QN AUTO: 32.9 PG (ref 26.5–33)
MCHC RBC AUTO-ENTMCNC: 32.3 G/DL (ref 31.5–36.5)
MCV RBC AUTO: 102 FL (ref 78–100)
P AXIS - MUSE: 54 DEGREES
PLATELET # BLD AUTO: 127 10E3/UL (ref 150–450)
PLPETH BLD-MCNC: <10 NG/ML
POPETH BLD-MCNC: <10 NG/ML
POTASSIUM SERPL-SCNC: 4.8 MMOL/L (ref 3.4–5.3)
POTASSIUM SERPL-SCNC: 5 MMOL/L (ref 3.4–5.3)
POTASSIUM SERPL-SCNC: 5.6 MMOL/L (ref 3.4–5.3)
PR INTERVAL - MUSE: 208 MS
PROT SERPL-MCNC: 7 G/DL (ref 6.4–8.3)
QRS DURATION - MUSE: 102 MS
QT - MUSE: 366 MS
QTC - MUSE: 479 MS
R AXIS - MUSE: 47 DEGREES
RBC # BLD AUTO: 2.46 10E6/UL (ref 4.4–5.9)
SODIUM SERPL-SCNC: 129 MMOL/L (ref 136–145)
SYSTOLIC BLOOD PRESSURE - MUSE: NORMAL MMHG
T AXIS - MUSE: 21 DEGREES
VENTRICULAR RATE- MUSE: 103 BPM
WBC # BLD AUTO: 13 10E3/UL (ref 4–11)

## 2023-02-14 PROCEDURE — 36415 COLL VENOUS BLD VENIPUNCTURE: CPT

## 2023-02-14 PROCEDURE — 250N000013 HC RX MED GY IP 250 OP 250 PS 637: Performed by: STUDENT IN AN ORGANIZED HEALTH CARE EDUCATION/TRAINING PROGRAM

## 2023-02-14 PROCEDURE — 250N000013 HC RX MED GY IP 250 OP 250 PS 637

## 2023-02-14 PROCEDURE — 99233 SBSQ HOSP IP/OBS HIGH 50: CPT | Mod: GC | Performed by: INTERNAL MEDICINE

## 2023-02-14 PROCEDURE — 84132 ASSAY OF SERUM POTASSIUM: CPT

## 2023-02-14 PROCEDURE — 97165 OT EVAL LOW COMPLEX 30 MIN: CPT | Mod: GO

## 2023-02-14 PROCEDURE — 99232 SBSQ HOSP IP/OBS MODERATE 35: CPT | Mod: GC | Performed by: INTERNAL MEDICINE

## 2023-02-14 PROCEDURE — C9113 INJ PANTOPRAZOLE SODIUM, VIA: HCPCS | Performed by: STUDENT IN AN ORGANIZED HEALTH CARE EDUCATION/TRAINING PROGRAM

## 2023-02-14 PROCEDURE — 97535 SELF CARE MNGMENT TRAINING: CPT | Mod: GO

## 2023-02-14 PROCEDURE — 85027 COMPLETE CBC AUTOMATED: CPT

## 2023-02-14 PROCEDURE — 250N000011 HC RX IP 250 OP 636: Performed by: STUDENT IN AN ORGANIZED HEALTH CARE EDUCATION/TRAINING PROGRAM

## 2023-02-14 PROCEDURE — 250N000009 HC RX 250

## 2023-02-14 PROCEDURE — 120N000003 HC R&B IMCU UMMC

## 2023-02-14 PROCEDURE — 85610 PROTHROMBIN TIME: CPT

## 2023-02-14 RX ORDER — DEXTROSE MONOHYDRATE 25 G/50ML
25-50 INJECTION, SOLUTION INTRAVENOUS
Status: DISCONTINUED | OUTPATIENT
Start: 2023-02-14 | End: 2023-02-14

## 2023-02-14 RX ORDER — FUROSEMIDE 10 MG/ML
40 INJECTION INTRAMUSCULAR; INTRAVENOUS ONCE
Status: CANCELLED | OUTPATIENT
Start: 2023-02-14

## 2023-02-14 RX ORDER — CARVEDILOL 6.25 MG/1
6.25 TABLET ORAL DAILY
Status: DISCONTINUED | OUTPATIENT
Start: 2023-02-15 | End: 2023-02-15

## 2023-02-14 RX ORDER — NICOTINE POLACRILEX 4 MG
15-30 LOZENGE BUCCAL
Status: DISCONTINUED | OUTPATIENT
Start: 2023-02-14 | End: 2023-02-14

## 2023-02-14 RX ORDER — DEXTROSE MONOHYDRATE 25 G/50ML
25-50 INJECTION, SOLUTION INTRAVENOUS
Status: DISCONTINUED | OUTPATIENT
Start: 2023-02-14 | End: 2023-02-20

## 2023-02-14 RX ORDER — PANTOPRAZOLE SODIUM 40 MG/1
40 TABLET, DELAYED RELEASE ORAL 2 TIMES DAILY
Status: DISCONTINUED | OUTPATIENT
Start: 2023-02-14 | End: 2023-02-28

## 2023-02-14 RX ORDER — NICOTINE POLACRILEX 4 MG
15-30 LOZENGE BUCCAL
Status: DISCONTINUED | OUTPATIENT
Start: 2023-02-14 | End: 2023-02-20

## 2023-02-14 RX ORDER — PANTOPRAZOLE SODIUM 40 MG/1
40 TABLET, DELAYED RELEASE ORAL
Status: CANCELLED | OUTPATIENT
Start: 2023-02-14

## 2023-02-14 RX ORDER — DEXTROSE MONOHYDRATE 25 G/50ML
25 INJECTION, SOLUTION INTRAVENOUS ONCE
Status: DISCONTINUED | OUTPATIENT
Start: 2023-02-14 | End: 2023-02-14

## 2023-02-14 RX ORDER — LACTULOSE 10 G/10G
20 SOLUTION ORAL 2 TIMES DAILY
Status: DISCONTINUED | OUTPATIENT
Start: 2023-02-14 | End: 2023-02-21

## 2023-02-14 RX ORDER — CARVEDILOL 6.25 MG/1
6.25 TABLET ORAL 2 TIMES DAILY WITH MEALS
Status: DISCONTINUED | OUTPATIENT
Start: 2023-02-14 | End: 2023-02-14

## 2023-02-14 RX ORDER — DEXTROSE MONOHYDRATE 100 MG/ML
INJECTION, SOLUTION INTRAVENOUS CONTINUOUS PRN
Status: DISCONTINUED | OUTPATIENT
Start: 2023-02-14 | End: 2023-02-14

## 2023-02-14 RX ADMIN — CEFTRIAXONE SODIUM 1 G: 1 INJECTION, POWDER, FOR SOLUTION INTRAMUSCULAR; INTRAVENOUS at 18:02

## 2023-02-14 RX ADMIN — OXYCODONE HYDROCHLORIDE 5 MG: 5 TABLET ORAL at 09:26

## 2023-02-14 RX ADMIN — RIFAXIMIN 550 MG: 550 TABLET ORAL at 19:52

## 2023-02-14 RX ADMIN — DEXTROSE 15 G: 15 GEL ORAL at 06:36

## 2023-02-14 RX ADMIN — PANTOPRAZOLE SODIUM 40 MG: 40 TABLET, DELAYED RELEASE ORAL at 19:52

## 2023-02-14 RX ADMIN — Medication 1 MG: at 01:20

## 2023-02-14 RX ADMIN — OXYCODONE HYDROCHLORIDE 5 MG: 5 TABLET ORAL at 22:08

## 2023-02-14 RX ADMIN — FLUOXETINE 60 MG: 20 CAPSULE ORAL at 22:08

## 2023-02-14 RX ADMIN — HUMAN INSULIN 5.5 UNITS/HR: 100 INJECTION, SOLUTION SUBCUTANEOUS at 01:28

## 2023-02-14 RX ADMIN — THERA TABS 1 TABLET: TAB at 09:26

## 2023-02-14 RX ADMIN — RIFAXIMIN 550 MG: 550 TABLET ORAL at 09:26

## 2023-02-14 RX ADMIN — CARVEDILOL 6.25 MG: 6.25 TABLET, FILM COATED ORAL at 09:26

## 2023-02-14 RX ADMIN — THIAMINE HCL TAB 100 MG 100 MG: 100 TAB at 09:26

## 2023-02-14 RX ADMIN — LACTULOSE 20 G: 10 POWDER, FOR SOLUTION ORAL at 09:43

## 2023-02-14 RX ADMIN — PANTOPRAZOLE SODIUM 40 MG: 40 INJECTION, POWDER, FOR SOLUTION INTRAVENOUS at 09:26

## 2023-02-14 RX ADMIN — FOLIC ACID 1 MG: 1 TABLET ORAL at 09:26

## 2023-02-14 RX ADMIN — LACTULOSE 20 G: 10 POWDER, FOR SOLUTION ORAL at 15:38

## 2023-02-14 RX ADMIN — DEXTROSE 15 G: 15 GEL ORAL at 06:18

## 2023-02-14 ASSESSMENT — ACTIVITIES OF DAILY LIVING (ADL)
ADLS_ACUITY_SCORE: 37

## 2023-02-14 NOTE — PLAN OF CARE
Neuro: A&O x 4. Afebrile. Pleasant affect. Calls appropriately.   Cardiac: Normal sinus rhythm on tele monitoring   Respiratory: Sating well on RA.   GI/: Good UOP. LBM 2/14/2023. Denies nausea no abdominal discomfort  Endocrine: ACHS  Diet/Appetite: Patient is on a regular diet   LDA: X 2 PIVs on the left arm saline locked  Activity: Patient is an assist of 1 with a transfer belt   Pain: Patient received oxycodone x 1 PRN for pain with relief.   Plan: Continue to monitor and alert team with any changes or concerns.

## 2023-02-14 NOTE — PROGRESS NOTES
6B Shift Note 8022-1223    Labs overnight: Lactic acid improved from 4.7 to 1.8 after boluses given on evening shift. Potassium 4.8, Hgb 7.9. Blood sugars continue to be labile, on IV insulin gtt per protocol, see MAR for details.    Glycemic control: 6am BG check was hypoglycemic at 66, insulin gtt paused and 1 apple juice given per hypoglycemia protocol. Pt asymptomatic, awake and alert while hypoglycemic, Q15min recheck completed and was 54. additional 30g carbs given. Next recheck was 95.     Neuro: A&Ox4. BUE tremors. On [place] protocol, did not require PRN lactulose overnight, mentation was clear.  Cardiac: SR. VSS.   Respiratory: Sating >92% on RA.  GI/: Adequate urine output. Multiple small BM's overnight (maroon/brown color). No further hematemesis overnight.  Diet/appetite: Tolerating clear liquid diet. Advance as tolerated ordered.  Activity:  Assist of 1 with GB, walked around the whole unit x1. Using bedside commode at night  Pain: At acceptable level on current regimen. One time dose of 5mg Oxycodone given for generalized pain with relief.  Skin: No new deficits noted.   LDA's: L PIVx2, insulin gtt running per protocol. Octeotride continuously running at 50mcg/hr    Plan: Monitor blood glucose and labs closely. Continue with POC. Notify primary team with changes.

## 2023-02-14 NOTE — PROVIDER NOTIFICATION
Time Notified: 2224  Provider Notified: Sofya cuevas intern  Patient Status:  pt wondering about additional pain med for generalized body aches. takes oxycodone at home, but is currently being held. any other option or possibly 1 time dose of oxy?     Orders: 1 time dose 5mg oxycodone ordered

## 2023-02-14 NOTE — PROVIDER NOTIFICATION
Time of notification: 6:20 PM  Provider notified: Sofya Poon  Patient status: ~100ml hematemesis (bright red clots). VSS.  Temp:  [97.1  F (36.2  C)-98.8  F (37.1  C)] 98.5  F (36.9  C)  Pulse:  [] 93  Resp:  [15-18] 18  BP: (124-142)/(68-86) 142/82  SpO2:  [97 %-100 %] 98 %  Orders received: Primary team to consult GI, will notify with changes. Nursing will continue to monitor and update MD with any changes

## 2023-02-14 NOTE — PROVIDER NOTIFICATION
Provider was notified for potassium levels of 5.6   Orders to shift potassium were placed.  Ordered for potassium recheck with results of 4.8  No other interventions or actions taken.

## 2023-02-14 NOTE — PROVIDER NOTIFICATION
Provider was notified of blood glucose levels at 474.   Corrective insulin was given per sliding scale and blood glucose rechecked with results of 475. Endocrinology consult order is placed.

## 2023-02-14 NOTE — PROGRESS NOTES
CLINICAL NUTRITION SERVICES - BRIEF NOTE     Nutrition Prescription     Recommendations already ordered by Registered Dietitian (RD):  - Modify supplement order with diet advancement  --> Discontinue clear liquid supplements  --> Add Ensure Plus (vanilla) at 2 PM and Special K protein bar once stephen, also at 2 PM per request     Future/Additional Recommendations:  Continue to monitor nutrition related findings per pt protocol    For last full RD assessment, see note dated 2/12/23    NEW FINDINGS   - Patient's diet order had now advanced from clear liquids to regular. Met with pt today who is feeling well, would like to revise his supplement order w/ diet advancement. Discussed ONS/high protein snack options and modified supplement order per pt preference.    - Monitor PO intake, encourage high protein foods.     INTERVENTIONS  Implementation  Medical food supplement therapy - Modified as above    Monitoring/Evaluation  Will continue to monitor and evaluate per protocol.    Charlie Hester, FABIOLAN, LD, CNSC  6B RD pager: [phone]   6B work-room RD phone: *[phone]    Weekend/Holiday RD pager [phone]

## 2023-02-14 NOTE — PROGRESS NOTES
GASTROENTEROLOGY PROGRESS NOTE    Date: 02/14/2023     ASSESSMENT: 27 year old male with a medical history of Asperger's, T2DM, alcohol related cirrhosis c/b hepatic encephalopathy, history of esophageal varices bleeding (5/2022, 1/2023) and rectal varices s/p sclerotherapy 1/2023, ascites presenting with multiple episodes of hematemesis. Underwent EGD 2/11 showed oozing portal hypertensive gastropathy, and healed esophageal ulcer.    Portal hypertensive gastropathy, bleeding, improved  EGD showed oozing portal hypertensive gastropathy. Starting on-selective b-blocker for PHG and also for  history of esophageal varices bleeding.    Decompensated cirrhosis with hepatic encephalopathy, h/o esophageal variceal bleed, HE, MELD-Na 28  Rectal varices, s/p sclerotherapy 1/2023    ASHISH, improved  Likely from acute blood loss.     RECOMMENDATIONS:  - Diet as tolerates, 2 g low sodium diet  - stop octreotide  - continue carvedilol 6.25 mg po BID, monitor blood pressure  - lactulose titrate to 3 BMs/day, and rifaximin  - ensure adequate protein calories intake with protein supplement at least BID  - PT/OT  - Diuretics per primary team  - GI will schedule EGD with possible RFA in 3 months for further treatment of portal hypertensive gastropathy    Gastroenterology follow up recommendations: Dr. Wood scheduled for 4/18/23      Pt care plan discussed with Dr. Leventhal, GI staff physician.    Lita Vences MD  Gastroenterology Fellow  Division of Gastroenterology, Hepatology and Nutrition  [facility]  Page [phone]  _______________________________________________________________    Subjective: Overnight had 100 mL blood coming out of his mouth, per patient it was from his buccal area, no vomiting blood. Octreotide restarted overnight.    No abdominal pain, no fever. STill having extremities pain which is stable. Stable tremor.    Objective:  Blood pressure 133/78, pulse 92, temperature 97.9  F (36.6  C),  temperature source Oral, resp. rate 16, weight 67.7 kg (149 lb 4.8 oz), SpO2 97 %.    Gen: A&Ox3, NAD  HEENT: sclera icteric  CV: RRR  Lungs: breathing comfortably on room air  Abd: soft, nontender, nondistended  Skin: no jaundice, no stigmata of chronic liver disease  MS: decreased muscle mass for age  Neuro: non focal, no asterixis, +action tremor     LABS:  BMP  Recent Labs   Lab 02/14/23  0650 02/14/23  0633 02/14/23  0612 02/14/23  0507 02/14/23  0101 02/14/23  0020 02/13/23  2208 02/13/23  2111 02/13/23  1238 02/13/23  1141 02/13/23  1028 02/13/23  0916 02/13/23  0913 02/13/23  0625 02/12/23  0811 02/12/23  0541 02/12/23  0503 02/12/23  0219 02/11/23  2259 02/11/23  1852   NA  --   --   --   --   --   --   --   --   --   --   --   --   --  131*  --  131*  --  135  --  133*   POTASSIUM  --   --   --   --   --  4.8  --  4.8  --  4.7  --  5.8*  --  5.7*   < > 5.9*  --  4.3   < > 5.5*   CHLORIDE  --   --   --   --   --   --   --   --   --   --   --   --   --  97*  --  97*  --   --   --  96*   SARTHAK  --   --   --   --   --   --   --   --   --   --   --   --   --  9.2  --  9.3  --   --   --  9.9   CO2  --   --   --   --   --   --   --   --   --   --   --   --   --  17*  --  22  --   --   --  27   BUN  --   --   --   --   --   --   --   --   --   --   --   --   --  54.0*  --  37.3*  --   --   --  33*   CR  --   --   --   --   --   --   --   --   --   --   --   --   --  1.30*  --  1.22*  --   --   --  1.18   GLC 95 54* 66* 139*   < >  --    < >  --    < >  --    < >  --    < > 353*   < > 261*   < > 186*   < > 216*    < > = values in this interval not displayed.     CBC  Recent Labs   Lab 02/13/23  2111 02/13/23  1256 02/13/23  0730 02/12/23  1103 02/12/23  0541 02/11/23  2259 02/11/23  1852   WBC  --  10.8 8.1  --  7.9  --  11.1*   RBC  --  2.62* 2.61*  --  2.26*  --  2.68*   HGB 7.9* 8.8* 8.6*   < > 7.6*   < > 9.1*   HCT  --  25.3* 25.8*  --  23.2*  --  27.4*   MCV  --  97 99  --  103*  --  102*   MCH  --  33.6* 33.0   --  33.6*  --  34.0*   MCHC  --  34.8 33.3  --  32.8  --  33.2   RDW  --  18.6* 18.9*  --  16.9*  --  17.0*   PLT  --  138* 130*  --  132*  --  171    < > = values in this interval not displayed.     INR  Recent Labs   Lab 02/13/23  0730 02/12/23  0541 02/11/23  1852   INR 1.94* 1.55* 1.48*     LFTs  Recent Labs   Lab 02/13/23  0625 02/12/23  0541 02/11/23  1852   ALKPHOS 98 113 157*   AST  --  74* 71*   ALT 43 42 54*   BILITOTAL 9.3* 8.5* 10.7*   PROTTOTAL 7.0 6.9 8.4*   ALBUMIN 3.2* 3.4* 3.6      PANC  Recent Labs   Lab 02/11/23  1852   LIPASE <9   MELD-Na score: 28 at 2/13/2023  7:30 AM  MELD score: 25 at 2/13/2023  7:30 AM  Calculated from:  Serum Creatinine: 1.30 mg/dL at 2/13/2023  6:25 AM  Serum Sodium: 131 mmol/L at 2/13/2023  6:25 AM  Total Bilirubin: 9.3 mg/dL at 2/13/2023  6:25 AM  INR(ratio): 1.94 at 2/13/2023  7:30 AM  Age: 27 years    IMAGING:  Reviewed.

## 2023-02-14 NOTE — PROGRESS NOTES
[facility]    Progress Note - Medicine Service, MAROON TEAM 2       Date of Admission:  2/11/2023    Assessment & Plan   Dillon Salter is a 27 year old male with history of Asperger's, alcoholic cirrhosis c/b esophageal varices and hepatic encephalopathy currently undergoing liver transplant evaluation at [facility], DM II who was directly admitted from [facility] for hematemesis, acute on chronic blood loss anemia, decompensated cirrhosis and hepatic encephalopathy. Hemodynamically stable with improving encephalopathy.    Changes Today:  - regular diet  - missed glargine dose this AM, likely contributing to hyperglycemia today   > resume in PM, continue on HDSSI until then  - transition PPI from IV to PO  - decrease lactulose to BID with target of 3-5 BMs daily  - start coreg 6.25 mg daily per GI recs, uptitrate to BID when able  - octreotide stopped  - shifted for K 5.6 -> 4.8, trend K     Acute on chronic blood loss anemia  Hematemesis  Hx of esophageal varices   Portal hypertensive gastropathy  Esophageal ulceration  Recurrent rectal varices  2-3 episodes hematemesis prior to ED of ~400 mL, ~325 mL at ER, 250 mL anu hematemesis since arrival. HDS. Given 1 unit pRBCs on 2/12 for Hgb of 6.9. INR 1.48. Esophageal varices recently banded 1/23/23. 2 20 gauge IVs in place. EGD 2/12 AM: diffuse oozing likely 2/2 portal hypertensive gastropathy, no bleeding seen from recent banding, nonbleeding esophageal ulcers seen. No continued hematemesis, hemoglobin stable as of 2/13.  - telemetry   - trend hgb daily and transfuse for <7  - pantoprazole 40mg BID  - ceftriaxone 1g qday  - octreotide discontinued 2/13, restarted evening of 2/13 for presumed hematemesis, discontinued 2/14  - coreg 6.25mg daily  - GI following    Hepatic encephalopathy, improving  Decompensated alcohol related cirrhosis c/b esophageal varices   Portal HTN and splenomegaly  Coagulopathy due to  liver disease  Thrombocytopenia due to liver disease, present on admission  Follows with Dr. Wood. MELD-Na 24 today  Etiology: Alcohol, diagnosed 4/2022  HE: lactulose per [facility] protocol (holding PTA scheduled currently), continue rifaximin  EV/GV: esophageal varices s/p banding 1/23/23, stable on EGD 2/12 without evidence of bleeding; start carvedilol BID when able (holding for now given elevated lactate)  Coagulopathy: INR 1.48,  (suspect 171 on admission was hemoconcentration)  Ascites: None per RUQ 2/12, hold PTA bumex 1 mg and spironolactone given ASHISH  SBP:  no h/o   HCC: last CT 1/2023, no evidence of  Liver transplant candidacy: Last drink 4/2022, follows with Dr. Wood. Ongoing liver transplant eval outpatient including Chemical dependency assessment and programming/Mental health referral, ongoing. CT angiogram, scheduled 4/2023. PFTs given history of tobacco use  - GI consulted, appreciate recs  - daily MELD labs  - NG placement for lactulose administration per [facility] protocol 2/12, NG tube removed 2/13 as patient's mental status improved and able to take meds PO  - RUQ US + doppler w/o ascites seen  - <2g Na, <2L water, >1.5 g/kg/day protein diet  - Bcx NGTD    MELD-Na score: 28 at 2/14/2023 10:24 AM  MELD score: 24 at 2/14/2023 10:24 AM  Calculated from:  Serum Creatinine: 0.89 mg/dL (Using min of 1 mg/dL) at 2/14/2023  9:30 AM  Serum Sodium: 129 mmol/L at 2/14/2023  9:30 AM  Total Bilirubin: 7.9 mg/dL at 2/14/2023  9:30 AM  INR(ratio): 2.29 at 2/14/2023 10:24 AM  Age: 27 years      Lactic acidosis  Lactate 2.8 on admission. Likely 2/2 hypovolemia in the setting of acute GIB. Given 500 ml w/ K shifting on 2/12, then 1u pRBC  - trend, give additional fluids/blood as needed     Severe hyperkalemia  K 5.9 -> 6.3 -> 6.2 -> 5.8  Shifted x1 overnight and 2/12 with insulin/D50, sodium bicarb, and lasix 40 mg IV x1.  - shifted again 2/13 with insulin and lasix and 2/14 with insulin  - Lactulose to  aid with BM's, as noted above  - If not responsive to further shifting, then d/w renal     ASHISH, improved  Hyponatremia  Hyperphosphatemia  Hypomagnesemia  Suspect pre-renal - hypovolemia secondary to blood loss and poor PO intake for past days and continued diuretic use. Also c/f HRS vs ATN. Cr 1.22 (baseline 0.8) on admission.  - strict I/Os  - trend BMPs  - replete fluid losses with IVF and blood products as needed  - holding PTA diuretics      Leukocytosis, resolved  Suspect reactive in setting of UGIB. No focal exam or history otherwise. HDS.   - on ceftriaxone for UGIB for SBP ppx as above  - trend   - Bcx NGTD     CHRONIC & STABLE PROBLEMS     Hyperglycemia, worsened  DM II - hgb A1c 5.6%  - glargine 20 units BID (same as home dose)  - high dose sliding scale insulin  - hypoglycemia protocol     MDD/anxiety   Autism spectrum   Mother is his caretaker, lives with parents.   - continue PTA fluoxetine  - previously his mother, Leticia, has been able to stay with patient overnight which helps with mood/anxiety. This was discussed with charge RN    Chronic pain, musculoskeletal  - Holding PTA oxycodone (recently started by PCP to help w/ pain and promote working w/ PT) - concern about causing oversedation  - Gabapentin not a good option - previously caused excessive drowsiness  - Continue Tylenol, max 2 Gm daily  - Will discuss other agents    Malnutrition:   Level of malnutrition: Severe   - nutrition plan to be clarified once able to tolerate PO       Diet: Regular Diet Adult  Snacks/Supplements Adult: Other; See comments below; With Meals    DVT Prophylaxis: Pneumatic compression device  Negron Catheter: Not present  Fluids: s/p 500 ml bolus 2/12 AM  Lines: None     Cardiac Monitoring: ACTIVE order. Indication: Electrolyte Imbalance (24 hours)- Magnesium <1.3 mg/ml; Potassium < =2.8 or > 5.5 mg/ml  Code Status: Full Code      Clinically Significant Risk Factors        # Hyperkalemia: Highest K = 5.8 mmol/L in last  2 days, will monitor as appropriate  # Hyponatremia: Lowest Na = 129 mmol/L in last 2 days, will monitor as appropriate   # Hypercalcemia: corrected calcium is >10.1, will monitor as appropriate    # Hypoalbuminemia: Lowest albumin = 3.2 g/dL at 2/13/2023  6:25 AM, will monitor as appropriate   # Thrombocytopenia: Lowest platelets = 127 in last 2 days, will monitor for bleeding           # Severe Malnutrition: based on nutrition assessment, PRESENT ON ADMISSION       Disposition Plan      Expected Discharge Date: 02/16/2023      Destination: home with family  Discharge Comments: Not medically ready        The patient's care was discussed with the Attending Physician, Dr. Smith.    Narcisa Willard, MS3  Medicine Service, [address]  Securely message with Vocera (more info)  Text page via [facility] Paging/Directory   See signed in provider for up to date coverage information       Resident/Fellow Attestation   I, Radha Granado MD, was present with the medical/CHRISTA student who participated in the service and in the documentation of the note.  I have verified the history and personally performed the physical exam and medical decision making.  I agree with the assessment and plan of care as documented in the note.      Radha Granado MD  PGY2  Date of Service (when I saw the patient): 02/14/23      ______________________________________________________________________    Interval History   Nursing notes reviewed. No acute events overnight. There was concern for episode of hematemesis, but patient states that this was just bloody spit from his gums. He denies any emesis or coughing anything up. Was started on insulin drip yesterday evening for BGs in 400s, was transitioned off drip this AM due to lower sugars. Patient notes about 8-10 bowel movements yesterday, says they are still very loose and somewhat bloody, but he believes they are getting more brown over  time. Notes continued achy pain in lower extremities. Has found oxycodone to be very helpful for this.    Physical Exam   Vital Signs: Temp: 97.7  F (36.5  C) Temp src: Axillary BP: (!) 149/94 Pulse: 94   Resp: 18 SpO2: 97 % O2 Device: None (Room air)    Weight: 149 lbs 4.8 oz    General Appearance: Awake, alert, NAD  Respiratory: CTAB, no wheezing, no increased WOB  Cardiovascular: Tachycardic, regular rhythm, III/VI systolic murmur, extremities warm and well perfused  GI: Soft, nontender, no fluid wave  Skin: No rash, some bruising on the lower extremities, no edema  Neuro: Oriented x3, no focal deficits.     Medical Decision Making      Please see A&P for additional details of medical decision making.      Data     I have personally reviewed the following data over the past 24 hrs:    13.0 (H)  \   8.1 (L)   / 127 (L)     129 (L) 96 (L) 40.1 (H) /  474 (H)   4.8 24 0.89 \       ALT: 40 AST: 49 AP: 99 TBILI: 7.9 (H)   ALB: 3.7 TOT PROTEIN: 7.0 LIPASE: N/A       Procal: N/A CRP: N/A Lactic Acid: 1.8       INR:  2.29 (H) PTT:  N/A   D-dimer:  N/A Fibrinogen:  N/A       Imaging results reviewed over the past 24 hrs:   No results found for this or any previous visit (from the past 24 hour(s)).  EGD 2/12/23:  Impression:            - Esophageal ulcers with no bleeding and no stigmata                          of recent bleeding.                          - Small (< 5 mm) esophageal varices.                          - Portal hypertensive gastropathy.                          - Normal examined duodenum.                          - Possible that he bled from post-banding ulcers, but                          no stigmata to make this highly likely. Diffuse oozing                          noted from portal hypertensive gastropathy was more                          likely source

## 2023-02-15 ENCOUNTER — APPOINTMENT (OUTPATIENT)
Dept: OCCUPATIONAL THERAPY | Facility: CLINIC | Age: 28
DRG: 005 | End: 2023-02-15
Attending: STUDENT IN AN ORGANIZED HEALTH CARE EDUCATION/TRAINING PROGRAM
Payer: COMMERCIAL

## 2023-02-15 LAB
ABO/RH(D): NORMAL
ALBUMIN SERPL BCG-MCNC: 3.2 G/DL (ref 3.5–5.2)
ALP SERPL-CCNC: 120 U/L (ref 40–129)
ALT SERPL W P-5'-P-CCNC: 50 U/L (ref 10–50)
ANION GAP SERPL CALCULATED.3IONS-SCNC: 16 MMOL/L (ref 7–15)
ANION GAP SERPL CALCULATED.3IONS-SCNC: 6 MMOL/L (ref 7–15)
ANTIBODY SCREEN: NEGATIVE
AST SERPL W P-5'-P-CCNC: 53 U/L (ref 10–50)
BILIRUB SERPL-MCNC: 5.1 MG/DL
BLD PROD TYP BPU: NORMAL
BLOOD COMPONENT TYPE: NORMAL
BUN SERPL-MCNC: 35.2 MG/DL (ref 6–20)
BUN SERPL-MCNC: 35.9 MG/DL (ref 6–20)
CALCIUM SERPL-MCNC: 9.2 MG/DL (ref 8.6–10)
CALCIUM SERPL-MCNC: 9.4 MG/DL (ref 8.6–10)
CHLORIDE SERPL-SCNC: 96 MMOL/L (ref 98–107)
CHLORIDE SERPL-SCNC: 98 MMOL/L (ref 98–107)
CODING SYSTEM: NORMAL
CREAT SERPL-MCNC: 0.81 MG/DL (ref 0.67–1.17)
CREAT SERPL-MCNC: 0.87 MG/DL (ref 0.67–1.17)
CROSSMATCH: NORMAL
DEPRECATED HCO3 PLAS-SCNC: 19 MMOL/L (ref 22–29)
DEPRECATED HCO3 PLAS-SCNC: 26 MMOL/L (ref 22–29)
ERYTHROCYTE [DISTWIDTH] IN BLOOD BY AUTOMATED COUNT: 17.8 % (ref 10–15)
GFR SERPL CREATININE-BSD FRML MDRD: >90 ML/MIN/1.73M2
GFR SERPL CREATININE-BSD FRML MDRD: >90 ML/MIN/1.73M2
GLUCOSE BLDC GLUCOMTR-MCNC: 203 MG/DL (ref 70–99)
GLUCOSE BLDC GLUCOMTR-MCNC: 205 MG/DL (ref 70–99)
GLUCOSE BLDC GLUCOMTR-MCNC: 221 MG/DL (ref 70–99)
GLUCOSE BLDC GLUCOMTR-MCNC: 274 MG/DL (ref 70–99)
GLUCOSE BLDC GLUCOMTR-MCNC: 274 MG/DL (ref 70–99)
GLUCOSE BLDC GLUCOMTR-MCNC: 299 MG/DL (ref 70–99)
GLUCOSE BLDC GLUCOMTR-MCNC: 324 MG/DL (ref 70–99)
GLUCOSE BLDC GLUCOMTR-MCNC: 335 MG/DL (ref 70–99)
GLUCOSE BLDC GLUCOMTR-MCNC: 402 MG/DL (ref 70–99)
GLUCOSE BLDC GLUCOMTR-MCNC: 437 MG/DL (ref 70–99)
GLUCOSE SERPL-MCNC: 296 MG/DL (ref 70–99)
GLUCOSE SERPL-MCNC: 317 MG/DL (ref 70–99)
HCT VFR BLD AUTO: 20.4 % (ref 40–53)
HGB BLD-MCNC: 6.8 G/DL (ref 13.3–17.7)
INR PPP: 2.15 (ref 0.85–1.15)
ISSUE DATE AND TIME: NORMAL
MCH RBC QN AUTO: 33.5 PG (ref 26.5–33)
MCHC RBC AUTO-ENTMCNC: 33.3 G/DL (ref 31.5–36.5)
MCV RBC AUTO: 101 FL (ref 78–100)
PLATELET # BLD AUTO: 105 10E3/UL (ref 150–450)
POTASSIUM SERPL-SCNC: 5.3 MMOL/L (ref 3.4–5.3)
POTASSIUM SERPL-SCNC: 5.4 MMOL/L (ref 3.4–5.3)
POTASSIUM SERPL-SCNC: 5.7 MMOL/L (ref 3.4–5.3)
PROT SERPL-MCNC: 5.9 G/DL (ref 6.4–8.3)
RBC # BLD AUTO: 2.03 10E6/UL (ref 4.4–5.9)
SODIUM SERPL-SCNC: 130 MMOL/L (ref 136–145)
SODIUM SERPL-SCNC: 131 MMOL/L (ref 136–145)
SPECIMEN EXPIRATION DATE: NORMAL
UNIT ABO/RH: NORMAL
UNIT NUMBER: NORMAL
UNIT STATUS: NORMAL
UNIT TYPE ISBT: 600
WBC # BLD AUTO: 12.6 10E3/UL (ref 4–11)

## 2023-02-15 PROCEDURE — 36415 COLL VENOUS BLD VENIPUNCTURE: CPT

## 2023-02-15 PROCEDURE — 85027 COMPLETE CBC AUTOMATED: CPT

## 2023-02-15 PROCEDURE — 250N000013 HC RX MED GY IP 250 OP 250 PS 637

## 2023-02-15 PROCEDURE — P9016 RBC LEUKOCYTES REDUCED: HCPCS | Performed by: STUDENT IN AN ORGANIZED HEALTH CARE EDUCATION/TRAINING PROGRAM

## 2023-02-15 PROCEDURE — 120N000003 HC R&B IMCU UMMC

## 2023-02-15 PROCEDURE — 80053 COMPREHEN METABOLIC PANEL: CPT

## 2023-02-15 PROCEDURE — 999N000147 HC STATISTIC PT IP EVAL DEFER

## 2023-02-15 PROCEDURE — 97535 SELF CARE MNGMENT TRAINING: CPT | Mod: GO

## 2023-02-15 PROCEDURE — 99233 SBSQ HOSP IP/OBS HIGH 50: CPT | Mod: GC | Performed by: INTERNAL MEDICINE

## 2023-02-15 PROCEDURE — 99231 SBSQ HOSP IP/OBS SF/LOW 25: CPT | Mod: GC | Performed by: INTERNAL MEDICINE

## 2023-02-15 PROCEDURE — 97530 THERAPEUTIC ACTIVITIES: CPT | Mod: GO

## 2023-02-15 PROCEDURE — 250N000013 HC RX MED GY IP 250 OP 250 PS 637: Performed by: STUDENT IN AN ORGANIZED HEALTH CARE EDUCATION/TRAINING PROGRAM

## 2023-02-15 PROCEDURE — 86901 BLOOD TYPING SEROLOGIC RH(D): CPT | Performed by: STUDENT IN AN ORGANIZED HEALTH CARE EDUCATION/TRAINING PROGRAM

## 2023-02-15 PROCEDURE — 84132 ASSAY OF SERUM POTASSIUM: CPT

## 2023-02-15 PROCEDURE — 250N000011 HC RX IP 250 OP 636: Performed by: STUDENT IN AN ORGANIZED HEALTH CARE EDUCATION/TRAINING PROGRAM

## 2023-02-15 PROCEDURE — 86850 RBC ANTIBODY SCREEN: CPT | Performed by: STUDENT IN AN ORGANIZED HEALTH CARE EDUCATION/TRAINING PROGRAM

## 2023-02-15 PROCEDURE — 85610 PROTHROMBIN TIME: CPT

## 2023-02-15 PROCEDURE — 999N000128 HC STATISTIC PERIPHERAL IV START W/O US GUIDANCE

## 2023-02-15 PROCEDURE — 86923 COMPATIBILITY TEST ELECTRIC: CPT | Performed by: STUDENT IN AN ORGANIZED HEALTH CARE EDUCATION/TRAINING PROGRAM

## 2023-02-15 RX ORDER — DEXTROSE MONOHYDRATE 25 G/50ML
25 INJECTION, SOLUTION INTRAVENOUS ONCE
Status: DISCONTINUED | OUTPATIENT
Start: 2023-02-15 | End: 2023-02-20

## 2023-02-15 RX ORDER — SODIUM POLYSTYRENE SULFONATE 15 G/60ML
30 SUSPENSION ORAL; RECTAL ONCE
Status: COMPLETED | OUTPATIENT
Start: 2023-02-15 | End: 2023-02-15

## 2023-02-15 RX ORDER — CARVEDILOL 6.25 MG/1
6.25 TABLET ORAL 2 TIMES DAILY
Status: DISCONTINUED | OUTPATIENT
Start: 2023-02-15 | End: 2023-02-24

## 2023-02-15 RX ADMIN — CARVEDILOL 6.25 MG: 6.25 TABLET, FILM COATED ORAL at 08:30

## 2023-02-15 RX ADMIN — CARVEDILOL 6.25 MG: 6.25 TABLET, FILM COATED ORAL at 19:37

## 2023-02-15 RX ADMIN — LACTULOSE 20 G: 10 POWDER, FOR SOLUTION ORAL at 19:36

## 2023-02-15 RX ADMIN — HUMAN INSULIN 7.01 UNITS: 100 INJECTION, SOLUTION SUBCUTANEOUS at 20:38

## 2023-02-15 RX ADMIN — THIAMINE HCL TAB 100 MG 100 MG: 100 TAB at 08:29

## 2023-02-15 RX ADMIN — OXYCODONE HYDROCHLORIDE 5 MG: 5 TABLET ORAL at 10:26

## 2023-02-15 RX ADMIN — FLUOXETINE 60 MG: 20 CAPSULE ORAL at 22:29

## 2023-02-15 RX ADMIN — SODIUM POLYSTYRENE SULFONATE 30 G: 15 SUSPENSION ORAL; RECTAL at 20:37

## 2023-02-15 RX ADMIN — PANTOPRAZOLE SODIUM 40 MG: 40 TABLET, DELAYED RELEASE ORAL at 08:30

## 2023-02-15 RX ADMIN — RIFAXIMIN 550 MG: 550 TABLET ORAL at 19:37

## 2023-02-15 RX ADMIN — FOLIC ACID 1 MG: 1 TABLET ORAL at 08:30

## 2023-02-15 RX ADMIN — CEFTRIAXONE SODIUM 1 G: 1 INJECTION, POWDER, FOR SOLUTION INTRAMUSCULAR; INTRAVENOUS at 17:25

## 2023-02-15 RX ADMIN — SODIUM ZIRCONIUM CYCLOSILICATE 10 G: 10 POWDER, FOR SUSPENSION ORAL at 02:26

## 2023-02-15 RX ADMIN — LACTULOSE 20 G: 10 POWDER, FOR SOLUTION ORAL at 08:29

## 2023-02-15 RX ADMIN — RIFAXIMIN 550 MG: 550 TABLET ORAL at 08:30

## 2023-02-15 RX ADMIN — THERA TABS 1 TABLET: TAB at 08:30

## 2023-02-15 RX ADMIN — Medication 1 MG: at 00:43

## 2023-02-15 RX ADMIN — OXYCODONE HYDROCHLORIDE 5 MG: 5 TABLET ORAL at 23:27

## 2023-02-15 RX ADMIN — PANTOPRAZOLE SODIUM 40 MG: 40 TABLET, DELAYED RELEASE ORAL at 19:37

## 2023-02-15 ASSESSMENT — ACTIVITIES OF DAILY LIVING (ADL)
ADLS_ACUITY_SCORE: 36
ADLS_ACUITY_SCORE: 36
ADLS_ACUITY_SCORE: 39
ADLS_ACUITY_SCORE: 36
ADLS_ACUITY_SCORE: 36
ADLS_ACUITY_SCORE: 37
ADLS_ACUITY_SCORE: 36
ADLS_ACUITY_SCORE: 39
ADLS_ACUITY_SCORE: 36
ADLS_ACUITY_SCORE: 37

## 2023-02-15 NOTE — PROGRESS NOTES
NURSING PROGRESS NOTE  Shift Summary    Date: February 15, 2023     Neuro/Musculoskeletal:  A&Ox4. BUE tremors. BLE neuropathy.  Cardiac:  SR.  VSS.   Respiratory:  Sating in the 90s on RA.  GI/:  Adequate urine output.  LBM: multiple, watery brown stools 2/15  Diet/Appetite:  Tolerating regular diet. Per conversation with dietician and primary team, pt is going to try to limit salt and potassium in diet.  Activity:  SBA.  Pain:  6-7/10 generalized pain/neuropathy- scheduled oxycodone.  Skin:  No new deficits noted. generalized ecchymosis.    Pertinent Shift Updates: Received 1 unit RBCs for Hgb 6.8- recheck Hgb in AM. BG high this evening when checked (402), however pt ate a late lunch so RN waited and rechecked BG when food arrived (335) and treated that value with sliding scale insulin.    Plan: monitor Hgb and s/s of bleeding, monitor blood sugars, promote activity as tolerated.    Luz Elena Rush RN  .................................................... February 15, 2023  7:05 PM  [facility] ([facility]): [place]

## 2023-02-15 NOTE — PROGRESS NOTES
GASTROENTEROLOGY PROGRESS NOTE    Date: 02/15/2023     ASSESSMENT: 27 year old male with a medical history of Asperger's, T2DM, alcohol related cirrhosis c/b hepatic encephalopathy, history of esophageal varices bleeding (5/2022, 1/2023) and rectal varices s/p sclerotherapy 1/2023, ascites presenting with multiple episodes of hematemesis. Underwent EGD 2/11 showed oozing portal hypertensive gastropathy, and healed esophageal ulcer.    Portal hypertensive gastropathy, bleeding, improved  EGD showed oozing portal hypertensive gastropathy. Starting on-selective b-blocker for PHG and also for  history of esophageal varices bleeding.    Decompensated cirrhosis with hepatic encephalopathy, h/o esophageal variceal bleed, HE, MELD-Na 28  Rectal varices, s/p sclerotherapy 1/2023  Protein calories malnutrition  Follows with Dr. Wood. Has PEth positive 1/26/23 in the setting of post transfusion (which could cause false positive level). Patient reports last drink 4/2022 and recently started on alc rehab program in Jan 2023.     ASHISH, improved Likely from acute blood loss.     RECOMMENDATIONS:  - Diet as tolerates, 2 g low sodium diet; ensure high protein intake  - continue carvedilol 6.25 mg po BID  - lactulose titrate to 3 BMs/day, and rifaximin  - ensure adequate protein calories intake with protein supplement at least BID  - PT/OT  - Diuretics per primary team    GI will schedule EGD with possible RFA in 3 months for further treatment of portal hypertensive gastropathy    Gastroenterology follow up recommendations: Dr. Wood scheduled for 4/18/23      Pt care plan discussed with Dr. Leventhal, GI staff physician.    Lita Vences MD  Gastroenterology Fellow  Division of Gastroenterology, Hepatology and Nutrition  [facility]  Page [phone]  _______________________________________________________________    Subjective: No further bleeding with 4 bowel movements overnight with brown color. No hematemesis.   No  abdominal pain, no fever. Stable tremor. No dizziness or lightheadedness.     Hgb this morning 6.8 g/dL.    Objective:  Blood pressure 129/85, pulse 88, temperature 97.7  F (36.5  C), temperature source Axillary, resp. rate 16, weight 70.1 kg (154 lb 8.7 oz), SpO2 100 %.    Gen: A&Ox3, NAD  HEENT: sclera icteric, dry lips  CV: RRR  Lungs: breathing comfortably on room air  Abd: soft, nontender, nondistended  Skin: no jaundice, no stigmata of chronic liver disease  MS: decreased muscle mass for age  Neuro: non focal, no asterixis, +action tremor     LABS:  BMP  Recent Labs   Lab 02/15/23  0714 02/15/23  0554 02/15/23  0045 02/14/23  2212 02/14/23 2011 02/14/23  1431 02/14/23  1430 02/14/23  1308 02/14/23  1215 02/14/23  0930 02/13/23  0913 02/13/23  0625   NA  --  130*  --   --  131*  --   --   --   --  129*  --  131*   POTASSIUM  --  5.3  --   --  5.4*  --  5.0  --  4.8 5.6*   < > 5.7*   CHLORIDE  --  98  --   --  96*  --   --   --   --  96*  --  97*   SARTHAK  --  9.2  --   --  9.4  --   --   --   --  9.5  --  9.2   CO2  --  26  --   --  19*  --   --   --   --  24  --  17*   BUN  --  35.2*  --   --  35.9*  --   --   --   --  40.1*  --  54.0*   CR  --  0.81  --   --  0.87  --   --   --   --  0.89  --  1.30*   * 296* 437* 349* 317*   < >  --    < >  --  276*   < > 353*    < > = values in this interval not displayed.     CBC  Recent Labs   Lab 02/15/23  0554 02/14/23  0930 02/13/23  2111 02/13/23  1256 02/13/23  0730   WBC 12.6* 13.0*  --  10.8 8.1   RBC 2.03* 2.46*  --  2.62* 2.61*   HGB 6.8* 8.1*   < > 8.8* 8.6*   HCT 20.4* 25.1*  --  25.3* 25.8*   * 102*  --  97 99   MCH 33.5* 32.9  --  33.6* 33.0   MCHC 33.3 32.3  --  34.8 33.3   RDW 17.8* 18.0*  --  18.6* 18.9*   * 127*  --  138* 130*    < > = values in this interval not displayed.     INR  Recent Labs   Lab 02/15/23  0554 02/14/23  1024 02/13/23  0730 02/12/23  0541   INR 2.15* 2.29* 1.94* 1.55*     LFTs  Recent Labs   Lab 02/15/23  0554  02/14/23  0930 02/13/23  0625 02/12/23  0541 02/11/23  1852   ALKPHOS 120 99 98 113 157*   AST 53* 49  --  74* 71*   ALT 50 40 43 42 54*   BILITOTAL 5.1* 7.9* 9.3* 8.5* 10.7*   PROTTOTAL 5.9* 7.0 7.0 6.9 8.4*   ALBUMIN 3.2* 3.7 3.2* 3.4* 3.6      PANC  Recent Labs   Lab 02/11/23  1852   LIPASE <9   MELD-Na score: 25 at 2/15/2023  5:54 AM  MELD score: 21 at 2/15/2023  5:54 AM  Calculated from:  Serum Creatinine: 0.81 mg/dL (Using min of 1 mg/dL) at 2/15/2023  5:54 AM  Serum Sodium: 130 mmol/L at 2/15/2023  5:54 AM  Total Bilirubin: 5.1 mg/dL at 2/15/2023  5:54 AM  INR(ratio): 2.15 at 2/15/2023  5:54 AM  Age: 27 years    IMAGING:  Reviewed.

## 2023-02-15 NOTE — PROVIDER NOTIFICATION
Critical lab notification    Lab notified writer at 0620    Writer Notified Sofya on call intern at 0624    Hemoglobin 6.8, will need a new type and screen if going to transfuse.    Orders: Type and screen and 1 unit PRBC ordered

## 2023-02-15 NOTE — PROGRESS NOTES
[facility]    Progress Note - Medicine Service, MAROON TEAM 2       Date of Admission:  2/11/2023    Assessment & Plan   Dillon Salter is a 27 year old male with history of Asperger's, alcoholic cirrhosis c/b esophageal varices and hepatic encephalopathy currently undergoing liver transplant evaluation at [facility], DM II who was directly admitted from [facility] for hematemesis, acute on chronic blood loss anemia, decompensated cirrhosis and hepatic encephalopathy. Hemodynamically stable with improving encephalopathy.    Changes Today:  - transfused for Hgb 6.8, repeat Hgb in the AM unless s/o bleeding  - lokelma given  - lactulose BID with target of 3-5 BMs daily  - increase coreg to 6.25mg BID  - 20 units glargine BID + HDSSI sliding scale        Acute on chronic blood loss anemia  Hematemesis  Hx of esophageal varices   Portal hypertensive gastropathy  Esophageal ulceration  Recurrent rectal varices  2-3 episodes hematemesis prior to ED of ~400 mL, ~325 mL at ER, 250 mL anu hematemesis since arrival. HDS. Given 1 unit pRBCs on 2/12 for Hgb of 6.9. INR 1.48. Esophageal varices recently banded 1/23/23. 2 20 gauge IVs in place. EGD 2/12 AM: diffuse oozing likely 2/2 portal hypertensive gastropathy, no bleeding seen from recent banding, nonbleeding esophageal ulcers seen. No continued hematemesis. Transfused for Hgb 6.8 on 2/15.  - telemetry   - trend hgb daily and transfuse for <7  - pantoprazole 40mg BID  - ceftriaxone 1g qday  - octreotide discontinued 2/13, restarted evening of 2/13 for presumed hematemesis, discontinued 2/14  - coreg 6.25mg BID  - repeat hgb in the AM  - GI following    > follow up with GI for a repeat EGD with possible RFA in 3 months    Hepatic encephalopathy, improving  Decompensated alcohol related cirrhosis c/b esophageal varices   Portal HTN and splenomegaly  Coagulopathy due to liver disease  Thrombocytopenia due to liver disease,  present on admission  Follows with Dr. Wood. MELD-Na 24 today  Etiology: Alcohol, diagnosed 4/2022  HE: lactulose per [facility] protocol (holding PTA scheduled currently), continue rifaximin  EV/GV: esophageal varices s/p banding 1/23/23, stable on EGD 2/12 without evidence of bleeding; start carvedilol BID when able (holding for now given elevated lactate)  Coagulopathy: INR 1.48,  (suspect 171 on admission was hemoconcentration)  Ascites: None per RUQ 2/12, hold PTA bumex 1 mg and spironolactone given ASHISH  SBP:  no h/o   HCC: last CT 1/2023, no evidence of  Liver transplant candidacy: Last drink 4/2022, follows with Dr. Wood. Ongoing liver transplant eval outpatient including Chemical dependency assessment and programming/Mental health referral, ongoing. CT angiogram, scheduled 4/2023. PFTs given history of tobacco use  - GI consulted, appreciate recs  - daily MELD labs  - NG placement for lactulose administration per [place] protocol 2/12, NG tube removed 2/13 as patient's mental status improved and able to take meds PO  - <2g Na, <2L water, >1.5 g/kg/day protein diet  - Bcx NGTD    MELD-Na score: 25 at 2/15/2023  5:54 AM  MELD score: 21 at 2/15/2023  5:54 AM  Calculated from:  Serum Creatinine: 0.81 mg/dL (Using min of 1 mg/dL) at 2/15/2023  5:54 AM  Serum Sodium: 130 mmol/L at 2/15/2023  5:54 AM  Total Bilirubin: 5.1 mg/dL at 2/15/2023  5:54 AM  INR(ratio): 2.15 at 2/15/2023  5:54 AM  Age: 27 years    Lactic acidosis, resolved  Lactate 2.8 on admission. Likely 2/2 hypovolemia in the setting of acute GIB. Given 500 ml w/ K shifting on 2/12, then 1u pRBC  - trend, give additional fluids/blood as needed     Severe hyperkalemia  K 5.9 -> 6.3 -> 6.2 -> 5.8 -> 5.3  Shifted x1 overnight and 2/12 with insulin/D50, sodium bicarb, and lasix 40 mg IV x1.  - shifted again 2/13 with insulin and lasix and 2/14 with insulin  - Lactulose to aid with BM's, as noted above  - lokelma on 2/15  - avoid high potassium  foods  - continue to monitor     ASHISH, improved  Hyponatremia  Hyperphosphatemia  Hypomagnesemia  Suspect pre-renal - hypovolemia secondary to blood loss and poor PO intake for past days and continued diuretic use. Also c/f HRS vs ATN. Cr 1.22 (baseline 0.8) on admission.  - strict I/Os  - trend BMPs  - replete fluid losses with IVF and blood products as needed  - holding PTA diuretics      Leukocytosis  Suspect reactive in setting of UGIB. No focal exam or history otherwise. HDS.   - on ceftriaxone for UGIB for SBP ppx as above  - trend   - Bcx NGTD     CHRONIC & STABLE PROBLEMS     Hyperglycemia, worsened  DM II - hgb A1c 5.6%  - glargine 20 units BID (same as home dose)  - high dose sliding scale insulin  - hypoglycemia protocol     MDD/anxiety   Autism spectrum   Mother is his caretaker, lives with parents.   - continue PTA fluoxetine  - previously his mother, Leticia, has been able to stay with patient overnight which helps with mood/anxiety. This was discussed with charge RN    Chronic pain, musculoskeletal  - Holding PTA oxycodone (recently started by PCP to help w/ pain and promote working w/ PT) - concern about causing oversedation  - Gabapentin not a good option - previously caused excessive drowsiness  - Continue Tylenol, max 2 Gm daily  - Will discuss other agents    Malnutrition:   Level of malnutrition: Severe   - nutrition plan (see note from 2/14)  --> Discontinue clear liquid supplements  --> Add Ensure Plus (vanilla) at 2 PM and Special K protein bar once stephen, also at 2 PM per request       Diet: Regular Diet Adult  Snacks/Supplements Adult: Other; See comments below; With Meals    DVT Prophylaxis: Pneumatic compression device  Negron Catheter: Not present  Fluids: None  Lines: None     Cardiac Monitoring: ACTIVE order. Indication: Electrolyte Imbalance (24 hours)- Magnesium <1.3 mg/ml; Potassium < =2.8 or > 5.5 mg/ml  Code Status: Full Code      Clinically Significant Risk Factors        #  "Hyperkalemia: Highest K = 5.8 mmol/L in last 2 days, will monitor as appropriate  # Hyponatremia: Lowest Na = 129 mmol/L in last 2 days, will monitor as appropriate   # Hypercalcemia: corrected calcium is >10.1, will monitor as appropriate    # Hypoalbuminemia: Lowest albumin = 3.2 g/dL at 2/15/2023  5:54 AM, will monitor as appropriate   # Thrombocytopenia: Lowest platelets = 105 in last 2 days, will monitor for bleeding          # Overweight: Estimated body mass index is 25.72 kg/m  as calculated from the following:    Height as of an earlier encounter on 2/11/23: 1.651 m (5' 5\").    Weight as of this encounter: 70.1 kg (154 lb 8.7 oz)., PRESENT ON ADMISSION  # Severe Malnutrition: based on nutrition assessment, PRESENT ON ADMISSION       Disposition Plan      Expected Discharge Date: 02/16/2023      Destination: home with family  Discharge Comments: Not medically ready        The patient's care was discussed with the Attending Physician, Dr. Smiht.    Narcisa Willard MS3  Medicine Service, [address]  Securely message with Vocera (more info)  Text page via [facility] Paging/Directory   See signed in provider for up to date coverage information       ___________________________    Interval History   Nursing notes reviewed. No acute events overnight. Patient feels overall well this morning. Had about 10 BMs yesterday, still very loose but patient and mother say they have no longer been bloody. Appetite has been good. Patient notes continued achy pain in lower extremities, for which oxycodone helps. No further hematemesis. Patient denies any chest pain, shortness of breath, or abdominal pain.    Physical Exam   Vital Signs: Temp: 98  F (36.7  C) Temp src: Oral BP: 119/71 Pulse: 85   Resp: 16 SpO2: 99 % O2 Device: None (Room air)    Weight: 154 lbs 8.68 oz    General Appearance: Awake, alert, NAD  Respiratory: CTAB, no wheezing, no increased WOB  Cardiovascular: " Tachycardic, regular rhythm, III/VI systolic murmur, extremities warm and well perfused  GI: Soft, nontender, no fluid wave  Skin: No rash, some bruising on the lower extremities, no edema  Neuro: Oriented x3, no focal deficits. Tremor in bilateral upper extremities.    Medical Decision Making      Please see A&P for additional details of medical decision making.      Data     I have personally reviewed the following data over the past 24 hrs:    12.6 (H)  \   6.8 (LL)   / 105 (L)     130 (L) 98 35.2 (H) /  299 (H)   5.3 26 0.81 \       ALT: 50 AST: 53 (H) AP: 120 TBILI: 5.1 (H)   ALB: 3.2 (L) TOT PROTEIN: 5.9 (L) LIPASE: N/A       INR:  2.15 (H) PTT:  N/A   D-dimer:  N/A Fibrinogen:  N/A       Imaging results reviewed over the past 24 hrs:   No results found for this or any previous visit (from the past 24 hour(s)).  EGD 2/12/23:  Impression:            - Esophageal ulcers with no bleeding and no stigmata                          of recent bleeding.                          - Small (< 5 mm) esophageal varices.                          - Portal hypertensive gastropathy.                          - Normal examined duodenum.                          - Possible that he bled from post-banding ulcers, but                          no stigmata to make this highly likely. Diffuse oozing                          noted from portal hypertensive gastropathy was more                          likely source

## 2023-02-15 NOTE — PROGRESS NOTES
6B Shift Note [id number]    Neuro: A&Ox4. BUE tremors  Cardiac: SR. VSS  Respiratory: Sating >92% on RA.  GI/: Adequate urine output. BM X4 overnight (brown, loose, no evidence of blood). Blood sugars elevated, Sliding scale insulin given per orders.  Diet/appetite: Tolerating regular diet. Eating well.  Activity:  Assist of 1, calls appropriately  Pain: At acceptable level on current regimen. PRN Oxycodone q12H with relief of generalized pain.  Skin: No new deficits noted.  LDA's: L PIV SL    Labs: Hemoglobin 6.8 this morning, will get unit of blood. Type and screen needs to be collected first.    Plan: Continue with POC. Notify primary team with changes.

## 2023-02-15 NOTE — PROVIDER NOTIFICATION
Notified: Sofya on call Intern - Dr. Moises Grayson  Time Notified: 0050    Situation: Blood glucose check just now was 437, no insulin coverage ordered for the 2am check. can you order 2am  sliding scale?    Orders: One time dose of 10 units Novolog insulin ordered

## 2023-02-15 NOTE — PLAN OF CARE
Physical Therapy: Orders received. Chart reviewed and discussed with treating OT. Observed pt ambulating in [place] with OT & talked with pt. Pt ambulates very slowly with short steps & limited clearance, WBOS but no LOB. Per communication with pt, current ambulation is at or near baseline and pt reports he is careful ambulating inside his house and always has someone with him/ an arm to hold onto as needed when leaving the house. He reports he prefers family assist over using a cane or walker & has no interest in using an AD. Per OT, pt was able to demonstrate slow completion of stairs per home set up, slow but no physical assist needed.? Physical Therapy not indicated due pt's needs being met by OT during current admission, pt not interested in training use of AD for gait & appears close to baseline mobility.?Anticipate no mobility barriers to discharge home. Defer discharge recommendations to OT.? Will complete orders.

## 2023-02-16 LAB
ALBUMIN SERPL BCG-MCNC: 3.4 G/DL (ref 3.5–5.2)
ALP SERPL-CCNC: 168 U/L (ref 40–129)
ALT SERPL W P-5'-P-CCNC: 73 U/L (ref 10–50)
ANION GAP SERPL CALCULATED.3IONS-SCNC: 8 MMOL/L (ref 7–15)
ANION GAP SERPL CALCULATED.3IONS-SCNC: 9 MMOL/L (ref 7–15)
AST SERPL W P-5'-P-CCNC: 74 U/L (ref 10–50)
BACTERIA BLD CULT: NO GROWTH
BACTERIA BLD CULT: NO GROWTH
BASE EXCESS BLDV CALC-SCNC: 5 MMOL/L (ref -7.7–1.9)
BILIRUB SERPL-MCNC: 6.8 MG/DL
BUN SERPL-MCNC: 33.2 MG/DL (ref 6–20)
BUN SERPL-MCNC: 33.2 MG/DL (ref 6–20)
CALCIUM SERPL-MCNC: 8.6 MG/DL (ref 8.6–10)
CALCIUM SERPL-MCNC: 8.9 MG/DL (ref 8.6–10)
CHLORIDE SERPL-SCNC: 93 MMOL/L (ref 98–107)
CHLORIDE SERPL-SCNC: 94 MMOL/L (ref 98–107)
CREAT SERPL-MCNC: 0.79 MG/DL (ref 0.67–1.17)
CREAT SERPL-MCNC: 0.81 MG/DL (ref 0.67–1.17)
DEPRECATED HCO3 PLAS-SCNC: 24 MMOL/L (ref 22–29)
DEPRECATED HCO3 PLAS-SCNC: 28 MMOL/L (ref 22–29)
ERYTHROCYTE [DISTWIDTH] IN BLOOD BY AUTOMATED COUNT: 19.9 % (ref 10–15)
FERRITIN SERPL-MCNC: 1725 NG/ML (ref 31–409)
GFR SERPL CREATININE-BSD FRML MDRD: >90 ML/MIN/1.73M2
GFR SERPL CREATININE-BSD FRML MDRD: >90 ML/MIN/1.73M2
GLUCOSE BLDC GLUCOMTR-MCNC: 223 MG/DL (ref 70–99)
GLUCOSE BLDC GLUCOMTR-MCNC: 356 MG/DL (ref 70–99)
GLUCOSE BLDC GLUCOMTR-MCNC: 363 MG/DL (ref 70–99)
GLUCOSE BLDC GLUCOMTR-MCNC: 365 MG/DL (ref 70–99)
GLUCOSE BLDC GLUCOMTR-MCNC: 368 MG/DL (ref 70–99)
GLUCOSE BLDC GLUCOMTR-MCNC: 408 MG/DL (ref 70–99)
GLUCOSE BLDC GLUCOMTR-MCNC: 415 MG/DL (ref 70–99)
GLUCOSE BLDC GLUCOMTR-MCNC: 437 MG/DL (ref 70–99)
GLUCOSE BLDC GLUCOMTR-MCNC: 455 MG/DL (ref 70–99)
GLUCOSE BLDC GLUCOMTR-MCNC: 469 MG/DL (ref 70–99)
GLUCOSE BLDC GLUCOMTR-MCNC: 471 MG/DL (ref 70–99)
GLUCOSE SERPL-MCNC: 390 MG/DL (ref 70–99)
GLUCOSE SERPL-MCNC: 497 MG/DL (ref 70–99)
HCO3 BLDV-SCNC: 32 MMOL/L (ref 21–28)
HCT VFR BLD AUTO: 25.4 % (ref 40–53)
HGB BLD-MCNC: 8.3 G/DL (ref 13.3–17.7)
INR PPP: 1.92 (ref 0.85–1.15)
MCH RBC QN AUTO: 33.5 PG (ref 26.5–33)
MCHC RBC AUTO-ENTMCNC: 32.7 G/DL (ref 31.5–36.5)
MCV RBC AUTO: 102 FL (ref 78–100)
O2/TOTAL GAS SETTING VFR VENT: 21 %
PCO2 BLDV: 56 MM HG (ref 40–50)
PH BLDV: 7.37 [PH] (ref 7.32–7.43)
PLATELET # BLD AUTO: 135 10E3/UL (ref 150–450)
PO2 BLDV: 55 MM HG (ref 25–47)
POTASSIUM SERPL-SCNC: 5 MMOL/L (ref 3.4–5.3)
POTASSIUM SERPL-SCNC: 5.2 MMOL/L (ref 3.4–5.3)
POTASSIUM SERPL-SCNC: 5.4 MMOL/L (ref 3.4–5.3)
PROT SERPL-MCNC: 6.3 G/DL (ref 6.4–8.3)
RBC # BLD AUTO: 2.48 10E6/UL (ref 4.4–5.9)
SODIUM SERPL-SCNC: 127 MMOL/L (ref 136–145)
SODIUM SERPL-SCNC: 129 MMOL/L (ref 136–145)
WBC # BLD AUTO: 14.2 10E3/UL (ref 4–11)

## 2023-02-16 PROCEDURE — 82728 ASSAY OF FERRITIN: CPT | Performed by: STUDENT IN AN ORGANIZED HEALTH CARE EDUCATION/TRAINING PROGRAM

## 2023-02-16 PROCEDURE — 36415 COLL VENOUS BLD VENIPUNCTURE: CPT

## 2023-02-16 PROCEDURE — 85027 COMPLETE CBC AUTOMATED: CPT

## 2023-02-16 PROCEDURE — 120N000003 HC R&B IMCU UMMC

## 2023-02-16 PROCEDURE — 258N000003 HC RX IP 258 OP 636

## 2023-02-16 PROCEDURE — 250N000013 HC RX MED GY IP 250 OP 250 PS 637

## 2023-02-16 PROCEDURE — 80053 COMPREHEN METABOLIC PANEL: CPT

## 2023-02-16 PROCEDURE — 99233 SBSQ HOSP IP/OBS HIGH 50: CPT | Mod: GC | Performed by: INTERNAL MEDICINE

## 2023-02-16 PROCEDURE — 99232 SBSQ HOSP IP/OBS MODERATE 35: CPT | Mod: GC | Performed by: STUDENT IN AN ORGANIZED HEALTH CARE EDUCATION/TRAINING PROGRAM

## 2023-02-16 PROCEDURE — 85610 PROTHROMBIN TIME: CPT

## 2023-02-16 PROCEDURE — 250N000013 HC RX MED GY IP 250 OP 250 PS 637: Performed by: STUDENT IN AN ORGANIZED HEALTH CARE EDUCATION/TRAINING PROGRAM

## 2023-02-16 PROCEDURE — 250N000011 HC RX IP 250 OP 636

## 2023-02-16 PROCEDURE — 82803 BLOOD GASES ANY COMBINATION: CPT

## 2023-02-16 RX ORDER — BUMETANIDE 1 MG/1
1 TABLET ORAL DAILY
Status: CANCELLED | OUTPATIENT
Start: 2023-02-16

## 2023-02-16 RX ORDER — SPIRONOLACTONE 25 MG/1
100 TABLET ORAL AT BEDTIME
Status: CANCELLED | OUTPATIENT
Start: 2023-02-16

## 2023-02-16 RX ORDER — BUMETANIDE 1 MG/1
1 TABLET ORAL DAILY
Status: DISCONTINUED | OUTPATIENT
Start: 2023-02-16 | End: 2023-02-24

## 2023-02-16 RX ADMIN — OXYCODONE HYDROCHLORIDE 5 MG: 5 TABLET ORAL at 13:22

## 2023-02-16 RX ADMIN — LACTULOSE 20 G: 10 POWDER, FOR SOLUTION ORAL at 20:25

## 2023-02-16 RX ADMIN — CARVEDILOL 6.25 MG: 6.25 TABLET, FILM COATED ORAL at 07:43

## 2023-02-16 RX ADMIN — FOLIC ACID 1 MG: 1 TABLET ORAL at 07:43

## 2023-02-16 RX ADMIN — RIFAXIMIN 550 MG: 550 TABLET ORAL at 20:15

## 2023-02-16 RX ADMIN — RIFAXIMIN 550 MG: 550 TABLET ORAL at 07:43

## 2023-02-16 RX ADMIN — CARVEDILOL 6.25 MG: 6.25 TABLET, FILM COATED ORAL at 20:15

## 2023-02-16 RX ADMIN — THIAMINE HCL TAB 100 MG 100 MG: 100 TAB at 07:43

## 2023-02-16 RX ADMIN — PANTOPRAZOLE SODIUM 40 MG: 40 TABLET, DELAYED RELEASE ORAL at 20:15

## 2023-02-16 RX ADMIN — BUMETANIDE 1 MG: 1 TABLET ORAL at 11:10

## 2023-02-16 RX ADMIN — LACTULOSE 20 G: 10 POWDER, FOR SOLUTION ORAL at 07:43

## 2023-02-16 RX ADMIN — PANTOPRAZOLE SODIUM 40 MG: 40 TABLET, DELAYED RELEASE ORAL at 07:43

## 2023-02-16 RX ADMIN — THERA TABS 1 TABLET: TAB at 07:43

## 2023-02-16 RX ADMIN — SODIUM CHLORIDE 500 ML: 9 INJECTION, SOLUTION INTRAVENOUS at 18:00

## 2023-02-16 RX ADMIN — CEFTRIAXONE SODIUM 1 G: 1 INJECTION, POWDER, FOR SOLUTION INTRAMUSCULAR; INTRAVENOUS at 18:20

## 2023-02-16 RX ADMIN — FLUOXETINE 60 MG: 20 CAPSULE ORAL at 22:04

## 2023-02-16 ASSESSMENT — ACTIVITIES OF DAILY LIVING (ADL)
ADLS_ACUITY_SCORE: 36

## 2023-02-16 NOTE — PROGRESS NOTES
GASTROENTEROLOGY PROGRESS NOTE    Date: 02/16/2023     ASSESSMENT: 27 year old male with a medical history of Asperger's, T2DM, alcohol related cirrhosis c/b hepatic encephalopathy, history of esophageal varices bleeding (5/2022, 1/2023) and rectal varices s/p sclerotherapy 1/2023, ascites presenting with multiple episodes of hematemesis. Underwent EGD 2/11 showed oozing portal hypertensive gastropathy, and healed esophageal ulcer.    Portal hypertensive gastropathy, bleeding, improved  EGD showed oozing portal hypertensive gastropathy. Starting on-selective b-blocker for PHG and also for  history of esophageal varices bleeding.    Decompensated cirrhosis with hepatic encephalopathy, h/o esophageal variceal bleed, HE, MELD-Na 27  Rectal varices, s/p sclerotherapy 1/2023  Protein calories malnutrition  Follows with Dr. Wood. Has PEth positive 1/26/23 in the setting of post transfusion (which could cause false positive level). Patient reports last drink 4/2022 and recently started on alc rehab program in Jan 2023.     ASHISH, resolved. Likely from acute blood loss.   Hyperkalemia    RECOMMENDATIONS:  - 2 g low sodium diet; ensure high protein intake  - continue carvedilol 6.25 mg po BID  - lactulose titrate to 3 BMs/day, and rifaximin  - ensure adequate protein calories intake with protein supplement at least BID  - PT/OT  - Diuretics per primary team (restarted home dose of bumex and spironolactone)    Gastroenterology follow up recommendations: Dr. Wood scheduled for 4/18/23.  GI will schedule EGD with possible RFA in 3 months for further treatment of portal hypertensive gastropathy. Will also repeat CBC in 1 week (will send a msg to transplant co-ordinator).      Pt care plan discussed with Dr. Leventhal, GI staff physician.    Lita Vences MD  Gastroenterology Fellow  Division of Gastroenterology, Hepatology and Nutrition  [facility]  [place]  [id number]  _______________________________________________________________  Subjective:  No hematemesis or hematochezia or melena. No abdominal pain, no fever. Stable tremor. No dizziness or lightheadedness.   Primary team is holding off on discharge given hyperkalemia.    Objective:  Blood pressure 136/70, pulse 79, temperature 98  F (36.7  C), temperature source Oral, resp. rate 18, weight 70.3 kg (155 lb), SpO2 100 %.    Gen: A&Ox3, NAD  HEENT: sclera icteric, dry lips  CV: RRR  Lungs: breathing comfortably on room air  Abd: nondistended  Skin: no jaundice, no stigmata of chronic liver disease  MS: decreased muscle mass for age  Neuro: non focal, no asterixis, +action tremor     LABS:  [facility]  Recent Labs   Lab 02/16/23  1105 02/16/23  0717 02/16/23  0543 02/16/23  0103 02/15/23  2357 02/15/23  2036 02/15/23  1911 02/15/23  0714 02/15/23  0554 02/14/23 2212 02/14/23 2011 02/14/23  1215 02/14/23  0930   NA  --   --  127*  --   --   --   --   --  130*  --  131*  --  129*   POTASSIUM  --   --  5.4*  --  5.2  --  5.7*  --  5.3  --  5.4*   < > 5.6*   CHLORIDE  --   --  94*  --   --   --   --   --  98  --  96*  --  96*   SARTHAK  --   --  8.6  --   --   --   --   --  9.2  --  9.4  --  9.5   CO2  --   --  24  --   --   --   --   --  26  --  19*  --  24   BUN  --   --  33.2*  --   --   --   --   --  35.2*  --  35.9*  --  40.1*   CR  --   --  0.81  --   --   --   --   --  0.81  --  0.87  --  0.89   * 363* 390* 223*  --    < >  --    < > 296*   < > 317*   < > 276*    < > = values in this interval not displayed.     CBC  Recent Labs   Lab 02/16/23  0543 02/15/23  0554 02/14/23  0930 02/13/23  2111 02/13/23  1256   WBC 14.2* 12.6* 13.0*  --  10.8   RBC 2.48* 2.03* 2.46*  --  2.62*   HGB 8.3* 6.8* 8.1*   < > 8.8*   HCT 25.4* 20.4* 25.1*  --  25.3*   * 101* 102*  --  97   MCH 33.5* 33.5* 32.9  --  33.6*   MCHC 32.7 33.3 32.3  --  34.8   RDW 19.9* 17.8* 18.0*  --  18.6*   * 105* 127*  --  138*    < > = values in this  interval not displayed.     INR  Recent Labs   Lab 02/16/23  0543 02/15/23  0554 02/14/23  1024 02/13/23  0730   INR 1.92* 2.15* 2.29* 1.94*     LFTs  Recent Labs   Lab 02/16/23  0543 02/15/23  0554 02/14/23  0930 02/13/23  0625 02/12/23  0541   ALKPHOS 168* 120 99 98 113   AST 74* 53* 49  --  74*   ALT 73* 50 40 43 42   BILITOTAL 6.8* 5.1* 7.9* 9.3* 8.5*   PROTTOTAL 6.3* 5.9* 7.0 7.0 6.9   ALBUMIN 3.4* 3.2* 3.7 3.2* 3.4*      PANC  Recent Labs   Lab 02/11/23  1852   LIPASE <9   MELD-Na score: 27 at 2/16/2023  5:43 AM  MELD score: 21 at 2/16/2023  5:43 AM  Calculated from:  Serum Creatinine: 0.81 mg/dL (Using min of 1 mg/dL) at 2/16/2023  5:43 AM  Serum Sodium: 127 mmol/L at 2/16/2023  5:43 AM  Total Bilirubin: 6.8 mg/dL at 2/16/2023  5:43 AM  INR(ratio): 1.92 at 2/16/2023  5:43 AM  Age: 27 years    IMAGING:  Reviewed.

## 2023-02-16 NOTE — PROVIDER NOTIFICATION
Time of notification: 1:59 PM  Provider notified: Rosita Willard  Patient status: FYI: BG has been high all day in 360s after and pt has only had breakfast so far. Do you want to continue with current insulin regimen or increase dosage?  Orders received: Already increased Lantus this AM by 5 units and want to hold off on adjusting anymore       Time of notification: 5:40 PM  Provider notified: Rupali Lopez MD  Patient status: Pt BG is 471. Can we order a insulin gtt or something else? His subcutaneous insulin dosing is not working  Orders received: extra 10 units of insulin aspart ordered    Time of notification: 7:10 PM  Provider notified: Rupali Lopez MD  Patient status: Lab glucose resulted at 1626 and is consistent with bedside BG checks. Do you still want another lab glucose check?  Orders received: Check BG QH. Page if does not downtrend, and hold off on food.    An additional provider note at 0851 was made regarding BG by Brandi NEWBERRY

## 2023-02-16 NOTE — PROGRESS NOTES
[facility]    Progress Note - Medicine Service, MAROON TEAM 2       Date of Admission:  2/11/2023    Assessment & Plan   Dillon Salter is a 27 year old male with history of Asperger's, alcoholic cirrhosis c/b esophageal varices and hepatic encephalopathy currently undergoing liver transplant evaluation at [facility], DM II who was directly admitted from [facility] for hematemesis, acute on chronic blood loss anemia, decompensated cirrhosis and hepatic encephalopathy. Hemodynamically stable with resolved encephalopathy. Anticipate discharge home as soon as tomorrow, pending stabilization of hyperkalemia and stable CBC post transfusion 2/15.    Changes Today:  - restart PTA bumex, still holding PTA spironolactone  - start low K/ low Na diet  - glargine increased to 25 units BID  - shifted with kayexalate and insulin overnight      Acute on chronic blood loss anemia  Hematemesis  Hx of esophageal varices   Portal hypertensive gastropathy  Esophageal ulceration  Recurrent rectal varices  2-3 episodes hematemesis prior to ED of ~400 mL, ~325 mL at ER, 250 mL anu hematemesis since arrival. HDS. Given 1 unit pRBCs on 2/12 for Hgb of 6.9. INR 1.48. Esophageal varices recently banded 1/23/23. 2 20 gauge IVs in place. EGD 2/12 AM: diffuse oozing likely 2/2 portal hypertensive gastropathy, no bleeding seen from recent banding, nonbleeding esophageal ulcers seen. No continued hematemesis. Transfused for Hgb 6.8 on 2/15.  - trend hgb daily and transfuse for <7  - pantoprazole 40mg BID  - ceftriaxone 1g qday for total of 7d  - octreotide discontinued 2/13, restarted evening of 2/13 for presumed hematemesis, discontinued 2/14  - coreg 6.25mg BID  - GI following    > follow up with GI for a repeat EGD with possible RFA in 3 months    > will need CMP and CBC 1 week after discharge (GI to set up)    Hepatic encephalopathy, improving  Decompensated alcohol related cirrhosis c/b  esophageal varices   Portal HTN and splenomegaly  Coagulopathy due to liver disease  Thrombocytopenia due to liver disease, present on admission  Follows with Dr. Wood. MELD-Na 24 today  Etiology: Alcohol, diagnosed 4/2022  HE: lactulose BID, continue rifaximin  EV/GV: esophageal varices s/p banding 1/23/23, stable on EGD 2/12 without evidence of bleeding; on carvedilol 6.25mg BID  Coagulopathy: INR 1.48,  (suspect 171 on admission was hemoconcentration)  Ascites: None per RUQ 2/12, held PTA bumex 1 mg and spironolactone given ASHISH -> bumex restarted 2/16  SBP:  no h/o   HCC: last CT 1/2023, no evidence of  Liver transplant candidacy: Last drink 4/2022, follows with Dr. Wood. Ongoing liver transplant eval outpatient including Chemical dependency assessment and programming/Mental health referral, ongoing. CT angiogram, scheduled 4/2023. PFTs given history of tobacco use  - GI consulted, appreciate recs  - daily MELD labs  - NG placement for lactulose administration per [facility] protocol 2/12, NG tube removed 2/13 as patient's mental status improved and able to take meds PO  - <2g Na, <2L water, >1.5 g/kg/day protein diet  - Bcx NGTD    MELD-Na score: 27 at 2/16/2023  5:43 AM  MELD score: 21 at 2/16/2023  5:43 AM  Calculated from:  Serum Creatinine: 0.81 mg/dL (Using min of 1 mg/dL) at 2/16/2023  5:43 AM  Serum Sodium: 127 mmol/L at 2/16/2023  5:43 AM  Total Bilirubin: 6.8 mg/dL at 2/16/2023  5:43 AM  INR(ratio): 1.92 at 2/16/2023  5:43 AM  Age: 27 years    Lactic acidosis, resolved  Lactate 2.8 on admission. Likely 2/2 hypovolemia in the setting of acute GIB. Given 500 ml w/ K shifting on 2/12, then 1u pRBC  - trend, give additional fluids/blood as needed     Severe hyperkalemia  K 5.9 -> 6.3 -> 6.2 -> 5.8 -> 5.3  Potentially due to absorption of blood from gastropathy, diet, and/or insulin deficient state. S/p multiple shifts 2/11-14. Received lokelma on 2/15 and kayexalate overnight.  - Lactulose to aid  with BM's, as noted above  - avoid high potassium foods  - resuming bumex, holding spironolactone  - continue to monitor     ASHISH, improved  Hyponatremia  Hyperphosphatemia  Hypomagnesemia  Suspect pre-renal - hypovolemia secondary to blood loss and poor PO intake for past days and continued diuretic use. Also c/f HRS vs ATN. Cr 1.22 (baseline 0.8) on admission. Improved to baseline (0.81) on 2/15.  - trend BMPs  - held PTA diuretics, resuming bumex on 2/16 in setting of hypervolemia and resolved ASHISH     Leukocytosis  Suspect reactive in setting of UGIB. No focal exam or history otherwise. HDS, has been afebrile.  - on ceftriaxone for UGIB for SBP ppx as above  - trend   - Bcx NGTD    Hypervolemic hyponatremia  Na 133 on admission, has been slowly trending down. Edema in feet and ankles noted on 2/16, slightly improved with elevation of legs.   - resuming bumex  - holding spironolactone given hyperkalemia  - salt restriction     CHRONIC & STABLE PROBLEMS     Hyperglycemia, worsened  DM II - hgb A1c 5.6%  - glargine increased to 25 units BID on 2/16 (home dose is 20 units BID)  - high dose sliding scale insulin  - hypoglycemia protocol     MDD/anxiety   Autism spectrum   Mother is his caretaker, lives with parents.   - continue PTA fluoxetine  - previously his mother, Leticia, has been able to stay with patient overnight which helps with mood/anxiety. This was discussed with charge RN    Chronic pain, musculoskeletal  - Holding PTA oxycodone (recently started by PCP to help w/ pain and promote working w/ PT) - concern about causing oversedation  - Gabapentin not a good option - previously caused excessive drowsiness  - Continue Tylenol, max 2 Gm daily  - Will discuss other agents    Malnutrition:   Level of malnutrition: Severe   - nutrition plan (see note from 2/14)  --> Discontinue clear liquid supplements  --> Add Ensure Plus (vanilla) at 2 PM and Special K protein bar once stephen, also at 2 PM per request       Diet:  "Snacks/Supplements Adult: Other; See comments below; With Meals  Combination Diet 2 gm K Diet; 2 gm NA Diet    DVT Prophylaxis: Pneumatic compression device  Negron Catheter: Not present  Fluids: None  Lines: None     Cardiac Monitoring: None  Code Status: Full Code      Clinically Significant Risk Factors        # Hyperkalemia: Highest K = 5.7 mmol/L in last 2 days, will monitor as appropriate  # Hyponatremia: Lowest Na = 127 mmol/L in last 2 days, will monitor as appropriate      # Hypoalbuminemia: Lowest albumin = 3.2 g/dL at 2/15/2023  5:54 AM, will monitor as appropriate   # Thrombocytopenia: Lowest platelets = 105 in last 2 days, will monitor for bleeding          # Overweight: Estimated body mass index is 25.79 kg/m  as calculated from the following:    Height as of an earlier encounter on 2/11/23: 1.651 m (5' 5\").    Weight as of this encounter: 70.3 kg (155 lb).   # Severe Malnutrition: based on nutrition assessment        Disposition Plan      Expected Discharge Date: 02/17/2023,  3:00 PM    Destination: home with family  Discharge Comments: Not medically ready        The patient's care was discussed with the Attending Physician, Dr. Smith.    Narcisa Willard, MS3  Medicine Service, [address]  Securely message with Vocera (more info)  Text page via [facility] Paging/Directory   See signed in provider for up to date coverage information     Resident/Fellow Attestation   I, Radha Granado MD, was present with the medical/CHRISTA student who participated in the service and in the documentation of the note.  I have verified the history and personally performed the physical exam and medical decision making.  I agree with the assessment and plan of care as documented in the note.      Radha Granado MD  PGY2  Date of Service (when I saw the patient): 02/16/23  ___________________________    Interval History   Nursing notes reviewed. No acute events overnight. " Patient feels overall well this morning. Was up most of the night last night so was tired this morning. Had about 8 BMs yesterday, still very loose but patient and mother say they have no longer been bloody. Appetite has been good. Patient notes continued achy pain in lower extremities, for which oxycodone helps. No further hematemesis. Patient denies any chest pain, shortness of breath, or abdominal pain. Patient has been able to walk around the unit.     Physical Exam   Vital Signs: Temp: 97.7  F (36.5  C) Temp src: Oral BP: 132/59 Pulse: 83   Resp: 16 SpO2: 99 % O2 Device: None (Room air)    Weight: 155 lbs 0 oz    General Appearance: Awake, alert, NAD  Respiratory: CTAB, no wheezing, no increased WOB  Cardiovascular: RRR, III/VI systolic murmur, extremities warm and well perfused  GI: Soft, nontender, no fluid wave  Skin: No rash, some bruising on the lower extremities, 1+ edema in feet/ankles  Neuro: Oriented x3, no focal deficits. Tremor in bilateral upper extremities.    Medical Decision Making      Please see A&P for additional details of medical decision making.      Data     I have personally reviewed the following data over the past 24 hrs:    14.2 (H)  \   8.3 (L)   / 135 (L)     127 (L) 94 (L) 33.2 (H) /  365 (H)   5.4 (H) 24 0.81 \       ALT: 73 (H) AST: 74 (H) AP: 168 (H) TBILI: 6.8 (H)   ALB: 3.4 (L) TOT PROTEIN: 6.3 (L) LIPASE: N/A       INR:  1.92 (H) PTT:  N/A   D-dimer:  N/A Fibrinogen:  N/A       Imaging results reviewed over the past 24 hrs:   No results found for this or any previous visit (from the past 24 hour(s)).  EGD 2/12/23:  Impression:            - Esophageal ulcers with no bleeding and no stigmata                          of recent bleeding.                          - Small (< 5 mm) esophageal varices.                          - Portal hypertensive gastropathy.                          - Normal examined duodenum.                          - Possible that he bled from post-banding  ulcers, but                          no stigmata to make this highly likely. Diffuse oozing                          noted from portal hypertensive gastropathy was more                          likely source

## 2023-02-16 NOTE — PROVIDER NOTIFICATION
Notified Sofya on call intern at [phone]    Situation: Pt has moderate pitting edema in his L foot, mild in right foot. getting a little better with elevation. Want to do a diuretic or just continue to monitor?    Orders: No new orders at this time

## 2023-02-16 NOTE — PROGRESS NOTES
Care Management Follow Up    Length of Stay (days): 5    Expected Discharge Date: 02/17/2023?     Concerns to be Addressed: discharge planning, medical readiness.   Patient plan of care discussed at interdisciplinary rounds: Yes    Anticipated Discharge Disposition: Home  Anticipated Discharge Services: Home care  Anticipated Discharge DME: No new DME noted.     Patient/family educated on Medicare website which has current facility and service quality ratings: NA  Education Provided on the Discharge Plan: Yes  Patient/Family in Agreement with the Plan: Yes    Referrals Placed by CM/SW: Home care  Private pay costs discussed: Not applicable    Additional Information:  Per chart review, patient is nearing medical readiness for discharge. Patient will discharge to home w/assist from family and HH services. Patient was receiving home care through [facility], they have been updated on potential for discharge in the next 1-2 days. Resumption orders were previously placed.     [facility] (RN/PT/OT)  Ph: [phone]   Fax: [phone]    Care coordination will continue to follow for discharge planning.     Noa Aguilera, CONICC, BSN    [facility]    [address]  [address] [place] [id number]    [email or web address]    Office: [phone] Pager: [phone]

## 2023-02-16 NOTE — PROGRESS NOTES
6B Shift Note [id number]    Overnight events: Potassium was 5.7 at 7pm - shifted with IV insulin and Kayexalate down to 5.2. No bloody stools or hematemesis. Hgb this AM stable at 8.3 (received 1 unit RBC yesterday).    Neuro: A&Ox4. BUE tremors (chronic)  Cardiac: Not on tele, order was discontinued yesterday. VSS. Edema noted in bilateral feet/ankles- on call intern notified, improved with elevation of legs.  Respiratory: Sating >92% on RA.  GI/: Adequate urine output. Multiple BM's overnight.  Diet/appetite: Tolerating regular diet. Eating well.  Activity:  Assist of 1, up to chair and in halls. Okay to walk around unit with mother.  Pain: At acceptable level on current regimen. PRN oxycodone given at bedtime with relief of generalized pain.  Skin: No new deficits noted.  LDA's: L PIV x2 SL    Plan: Monitor labs. Possible discharge home today pending labs? Continue with POC. Notify primary team with changes.

## 2023-02-17 ENCOUNTER — APPOINTMENT (OUTPATIENT)
Dept: OCCUPATIONAL THERAPY | Facility: CLINIC | Age: 28
DRG: 005 | End: 2023-02-17
Attending: STUDENT IN AN ORGANIZED HEALTH CARE EDUCATION/TRAINING PROGRAM
Payer: COMMERCIAL

## 2023-02-17 LAB
ALBUMIN SERPL BCG-MCNC: 3.1 G/DL (ref 3.5–5.2)
ALP SERPL-CCNC: 161 U/L (ref 40–129)
ALT SERPL W P-5'-P-CCNC: 61 U/L (ref 10–50)
ANION GAP SERPL CALCULATED.3IONS-SCNC: 6 MMOL/L (ref 7–15)
AST SERPL W P-5'-P-CCNC: 60 U/L (ref 10–50)
BILIRUB SERPL-MCNC: 5.4 MG/DL
BUN SERPL-MCNC: 35.1 MG/DL (ref 6–20)
CALCIUM SERPL-MCNC: 8.5 MG/DL (ref 8.6–10)
CHLORIDE SERPL-SCNC: 93 MMOL/L (ref 98–107)
CREAT SERPL-MCNC: 0.86 MG/DL (ref 0.67–1.17)
DEPRECATED HCO3 PLAS-SCNC: 29 MMOL/L (ref 22–29)
ERYTHROCYTE [DISTWIDTH] IN BLOOD BY AUTOMATED COUNT: 19.9 % (ref 10–15)
GFR SERPL CREATININE-BSD FRML MDRD: >90 ML/MIN/1.73M2
GLUCOSE BLDC GLUCOMTR-MCNC: 101 MG/DL (ref 70–99)
GLUCOSE BLDC GLUCOMTR-MCNC: 189 MG/DL (ref 70–99)
GLUCOSE BLDC GLUCOMTR-MCNC: 242 MG/DL (ref 70–99)
GLUCOSE BLDC GLUCOMTR-MCNC: 278 MG/DL (ref 70–99)
GLUCOSE BLDC GLUCOMTR-MCNC: 281 MG/DL (ref 70–99)
GLUCOSE BLDC GLUCOMTR-MCNC: 289 MG/DL (ref 70–99)
GLUCOSE BLDC GLUCOMTR-MCNC: 298 MG/DL (ref 70–99)
GLUCOSE BLDC GLUCOMTR-MCNC: 334 MG/DL (ref 70–99)
GLUCOSE BLDC GLUCOMTR-MCNC: 339 MG/DL (ref 70–99)
GLUCOSE SERPL-MCNC: 277 MG/DL (ref 70–99)
HCT VFR BLD AUTO: 21.6 % (ref 40–53)
HGB BLD-MCNC: 7.2 G/DL (ref 13.3–17.7)
INR PPP: 1.84 (ref 0.85–1.15)
MCH RBC QN AUTO: 33.6 PG (ref 26.5–33)
MCHC RBC AUTO-ENTMCNC: 33.3 G/DL (ref 31.5–36.5)
MCV RBC AUTO: 101 FL (ref 78–100)
PLATELET # BLD AUTO: 115 10E3/UL (ref 150–450)
POTASSIUM SERPL-SCNC: 5 MMOL/L (ref 3.4–5.3)
PROT SERPL-MCNC: 5.7 G/DL (ref 6.4–8.3)
RBC # BLD AUTO: 2.14 10E6/UL (ref 4.4–5.9)
SODIUM SERPL-SCNC: 128 MMOL/L (ref 136–145)
WBC # BLD AUTO: 11.4 10E3/UL (ref 4–11)

## 2023-02-17 PROCEDURE — 250N000013 HC RX MED GY IP 250 OP 250 PS 637

## 2023-02-17 PROCEDURE — 97110 THERAPEUTIC EXERCISES: CPT | Mod: GO

## 2023-02-17 PROCEDURE — 250N000013 HC RX MED GY IP 250 OP 250 PS 637: Performed by: STUDENT IN AN ORGANIZED HEALTH CARE EDUCATION/TRAINING PROGRAM

## 2023-02-17 PROCEDURE — 250N000011 HC RX IP 250 OP 636

## 2023-02-17 PROCEDURE — 85027 COMPLETE CBC AUTOMATED: CPT

## 2023-02-17 PROCEDURE — 85610 PROTHROMBIN TIME: CPT

## 2023-02-17 PROCEDURE — 36415 COLL VENOUS BLD VENIPUNCTURE: CPT

## 2023-02-17 PROCEDURE — 80053 COMPREHEN METABOLIC PANEL: CPT

## 2023-02-17 PROCEDURE — 97535 SELF CARE MNGMENT TRAINING: CPT | Mod: GO

## 2023-02-17 PROCEDURE — 120N000003 HC R&B IMCU UMMC

## 2023-02-17 PROCEDURE — 99233 SBSQ HOSP IP/OBS HIGH 50: CPT | Mod: GC | Performed by: INTERNAL MEDICINE

## 2023-02-17 RX ORDER — CARVEDILOL 6.25 MG/1
6.25 TABLET ORAL 2 TIMES DAILY
Qty: 60 TABLET | Refills: 0 | Status: SHIPPED | OUTPATIENT
Start: 2023-02-17 | End: 2023-03-17

## 2023-02-17 RX ORDER — OXYCODONE HYDROCHLORIDE 5 MG/1
5 TABLET ORAL 2 TIMES DAILY PRN
Status: DISCONTINUED | OUTPATIENT
Start: 2023-02-17 | End: 2023-02-23

## 2023-02-17 RX ORDER — ACETAMINOPHEN 650 MG/1
650 SUPPOSITORY RECTAL EVERY 4 HOURS PRN
Status: DISCONTINUED | OUTPATIENT
Start: 2023-02-17 | End: 2023-02-28

## 2023-02-17 RX ORDER — ACETAMINOPHEN 325 MG/1
650 TABLET ORAL EVERY 4 HOURS PRN
Status: DISCONTINUED | OUTPATIENT
Start: 2023-02-17 | End: 2023-02-28

## 2023-02-17 RX ADMIN — OXYCODONE HYDROCHLORIDE 5 MG: 5 TABLET ORAL at 01:40

## 2023-02-17 RX ADMIN — FLUOXETINE 60 MG: 20 CAPSULE ORAL at 22:06

## 2023-02-17 RX ADMIN — LACTULOSE 20 G: 10 POWDER, FOR SOLUTION ORAL at 08:01

## 2023-02-17 RX ADMIN — OXYCODONE HYDROCHLORIDE 5 MG: 5 TABLET ORAL at 10:17

## 2023-02-17 RX ADMIN — CEFTRIAXONE SODIUM 1 G: 1 INJECTION, POWDER, FOR SOLUTION INTRAMUSCULAR; INTRAVENOUS at 18:28

## 2023-02-17 RX ADMIN — PANTOPRAZOLE SODIUM 40 MG: 40 TABLET, DELAYED RELEASE ORAL at 19:50

## 2023-02-17 RX ADMIN — RIFAXIMIN 550 MG: 550 TABLET ORAL at 19:50

## 2023-02-17 RX ADMIN — FOLIC ACID 1 MG: 1 TABLET ORAL at 07:51

## 2023-02-17 RX ADMIN — OXYCODONE HYDROCHLORIDE 5 MG: 5 TABLET ORAL at 23:38

## 2023-02-17 RX ADMIN — CARVEDILOL 6.25 MG: 6.25 TABLET, FILM COATED ORAL at 19:50

## 2023-02-17 RX ADMIN — THERA TABS 1 TABLET: TAB at 07:52

## 2023-02-17 RX ADMIN — RIFAXIMIN 550 MG: 550 TABLET ORAL at 07:53

## 2023-02-17 RX ADMIN — BUMETANIDE 1 MG: 1 TABLET ORAL at 07:51

## 2023-02-17 RX ADMIN — CARVEDILOL 6.25 MG: 6.25 TABLET, FILM COATED ORAL at 07:51

## 2023-02-17 RX ADMIN — INSULIN ASPART 5 UNITS: 100 INJECTION, SOLUTION INTRAVENOUS; SUBCUTANEOUS at 00:23

## 2023-02-17 RX ADMIN — THIAMINE HCL TAB 100 MG 100 MG: 100 TAB at 07:52

## 2023-02-17 RX ADMIN — PANTOPRAZOLE SODIUM 40 MG: 40 TABLET, DELAYED RELEASE ORAL at 07:53

## 2023-02-17 RX ADMIN — LACTULOSE 20 G: 10 POWDER, FOR SOLUTION ORAL at 19:50

## 2023-02-17 RX ADMIN — INSULIN ASPART 6 UNITS: 100 INJECTION, SOLUTION INTRAVENOUS; SUBCUTANEOUS at 19:34

## 2023-02-17 ASSESSMENT — ACTIVITIES OF DAILY LIVING (ADL)
ADLS_ACUITY_SCORE: 36
ADLS_ACUITY_SCORE: 33
ADLS_ACUITY_SCORE: 36

## 2023-02-17 NOTE — PLAN OF CARE
Neuro: A&Ox4. Baseline tremors, [place] score 0 all shift   Cardiac: SR. VSS.Sys 110-130s  Respiratory: Sating 90s on RA.  GI/: Adequate urine output. BM X1  Diet/appetite: Tolerating 2g Na, low fat diet. Low appetite has only eaten breakfast at this point  Activity:  Assist of stand-by, up to chair and in halls.  Pain: At acceptable level on current regimen. Q12 oxy  Skin: No new deficits noted. BL feet edema, gen bruising, mouth ulcer, jaundice   LDA's: L PIV X2     Plan: Continue with POC. Notify primary team with changes.   BG has consistently been in 360s all day. Recent BG have been in 460s-470s. See provider notifications for details

## 2023-02-17 NOTE — PROVIDER NOTIFICATION
Time of notification: 9:42 PM  Provider notified: Sofya covering team   Patient status:  Pt mother is concerned about pt not having anything to eat since 8am. Hourly BG been in low 400s. Last BG check was 415.   Temp:  [97.5  F (36.4  C)-98.6  F (37  C)] 97.6  F (36.4  C)  Pulse:  [77-86] 81  Resp:  [16-20] 20  BP: (114-143)/(59-76) 143/76  SpO2:  [94 %-100 %] 98 %  Orders received:       2211    M. Joie 6b 23  Pt mother is very concerned about their blood sugar and would like to speak with a doctor at the bedside.   [id number]

## 2023-02-17 NOTE — PROGRESS NOTES
CLINICAL NUTRITION SERVICES - REASSESSMENT NOTE     Nutrition Prescription    RECOMMENDATIONS FOR MDs/PROVIDERS TO ORDER:  None currently    Malnutrition Status:    Moderate malnutrition in the context of chronic illness    Recommendations already ordered by Registered Dietitian (RD):    Special K bar daily    Ensure  Enlive  contains 350 kcal, 20 grams protein, 44 grams carbohydrate, 11 grams fat, 3 grams fiber per 8 fl oz.     Continued to encourage high protein, low Na, low K diet    Offered high protein, low Na diet education - pt and mom politely declined as they have had this ed in the past    Future/Additional Recommendations:    Offer low K diet education if pt is to discharge on K diet restriction     EVALUATION OF THE PROGRESS TOWARD GOALS   Diet: 2 g Potassium and High Consistent Carbohydrate    Intake/Tolerance: Patient consuming  % of 2-3 meal(s) daily     NEW FINDINGS   Pt reports liking Ensure and Special K - will continue daily. Offered high protein low Na diet, pt and mom report they have had this information     GI:    Last BM 2/16     stooling daily 1-6x/day     Weight:    Most Recent Weight: 70.3 kg (155 lb)      Wt up 10 lb from admission     Meds:    Folic acid    Novolog w/ meals and at HS; Lantus    Lactulose BID    Multivitamin     protonix    Thiamine 100 mg daily    Labs:     02/16/23 05:43 02/16/23 18:26 02/17/23 04:38   Sodium 127 (L) 129 (L) 128 (L)   Potassium 5.4 (H) 5.0 5.0   Urea Nitrogen 33.2 (H) 33.2 (H) 35.1 (H)   Calcium 8.6 8.9 8.5 (L)   Albumin 3.4 (L)  3.1 (L)   Protein Total 6.3 (L)  5.7 (L)   Alkaline Phosphatase 168 (H)  161 (H)   ALT 73 (H)  61 (H)   AST 74 (H)  60 (H)   Bilirubin Total 6.8 (H)  5.4 (H)   Ferritin 1,725 (H)     Glucose 390 (H) 497 (H) 277 (H)       MALNUTRITION  % Intake: Decreased intake does not meet criteria  % Weight Loss: Weight loss does not meet criteria for malnutrition   Subcutaneous Fat Loss: Facial region:  moderate and Lower arm:   moderate   Muscle Loss: Temporal:  moderate, Thoracic region (clavicle, acromium bone, deltoid, trapezius, pectoral):  moderate, Dorsal hand:  moderate, Patellar region:  severe and Posterior calf:  severe  Fluid Accumulation/Edema: Does not meet criteria  Malnutrition Diagnosis: Moderate malnutrition in the context of chronic illness    Previous Goals   Diet adv v nutrition support within 2-3 days.  Evaluation: Met    Total avg nutritional intake to meet a minimum of 30 kcal/kg and 1.2 g PRO/kg daily (per dosing wt 66 kg).  Evaluation: Met    Previous Nutrition Diagnosis  Inadequate protein-energy intake related to GI sx including N/V and NPO status as evidenced by pt's last oral intake was about 48 hours ago.     Evaluation: Improving    CURRENT NUTRITION DIAGNOSIS  Increased nutrient needs  related to catabolic disease state as evidenced by liver cirrhosis, increased calorie and protein needs.       INTERVENTIONS  Implementation  Medical food supplement therapy  Modify composition of meals/snacks     Goals  Patient to consume % of nutritionally adequate meal trays TID, or the equivalent with supplements/snacks.    Monitoring/Evaluation  Progress toward goals will be monitored and evaluated per protocol.      Josey Donahue RDN, LD  6B RD pager [phone]  Weekend/Holiday RD pager [phone]

## 2023-02-17 NOTE — PLAN OF CARE
Neuro: A&Ox4. No new neuro deficit  Cardiac: No tele order. VSS.   Respiratory: Sating >95% on RA.  GI/: Adequate urine output. BM X1 this shift  Diet/appetite: Tolerating 2g K+ diet. Eating well.  Activity:  Assist of 1 up to bathroom  Pain: At acceptable level on current regimen. Oxy x 1 with relief.   Skin: No new deficits noted.  LDA's:     Plan: Continue with POC. Notify primary team with changes.   Goal Outcome Evaluation:      Plan of Care Reviewed With: patient    Overall Patient Progress: improvingOverall Patient Progress: improving

## 2023-02-17 NOTE — PLAN OF CARE
Goal Outcome Evaluation:      Plan of Care Reviewed With: patient, parent    Overall Patient Progress: improvingOverall Patient Progress: improving    Outcome Evaluation: Pt taking % of 2-3 meals daily. Pt liking Ensure Enlive and Special K bar, will continue daily. Pt and family had no questions about high protein low Na diet. Discussed can review low K diet if needed for discharge. see RD note 2/17 for details.    Josey Donahue RDN, LD  6B RD pager [phone]  Weekend/Holiday RD pager [phone]

## 2023-02-17 NOTE — PROGRESS NOTES
[facility]    Progress Note - Medicine Service, MAROON TEAM 2       Date of Admission:  2/11/2023    Assessment & Plan   Dillon Salter is a 27 year old male with history of Asperger's, alcoholic cirrhosis c/b esophageal varices and hepatic encephalopathy currently undergoing liver transplant evaluation at [facility], DM II who was directly admitted from [facility] for hematemesis, acute on chronic blood loss anemia, decompensated cirrhosis and hepatic encephalopathy. Hemodynamically stable with resolved encephalopathy. Anticipate discharge home as soon as tomorrow, pending stabilization of hyperkalemia and stable CBC post transfusion 2/15.    Changes Today:  - start high carb consistent diet  - glargine increased to 30 units BID, then reduced back to 25 mg BID given   - monitor glucose throughout afternoon, if still high (greater than mid-200s) could consider restarting insulin gtt  - last dose of ceftriaxone today  - continue PTA bumex, still holding PTA spironolactone    Hyperglycemia, worsened  DM II - hgb A1c 5.6%  - glargine increased to 30 units BID, then reduced back to 25 mg BID given   - high dose sliding scale insulin  - carb coverage with meals (high carb consistent diet)  - hypoglycemia protocol     Acute on chronic blood loss anemia  Hematemesis, resolved  Hx of esophageal varices   Portal hypertensive gastropathy  Esophageal ulceration  Recurrent rectal varices  2-3 episodes hematemesis prior to ED of ~400 mL, ~325 mL at ER, 250 mL anu hematemesis since arrival. HDS. Given 1 unit pRBCs on 2/12 for Hgb of 6.9. INR 1.48. Esophageal varices recently banded 1/23/23. 2 20 gauge IVs in place. EGD 2/12 AM: diffuse oozing likely 2/2 portal hypertensive gastropathy, no bleeding seen from recent banding, nonbleeding esophageal ulcers seen. No continued hematemesis. Transfused for Hgb 6.8 on 2/15.  - trend hgb daily and transfuse for <7  -  pantoprazole 40mg BID  - ceftriaxone 1g qday for total of 7d (2/11 - 2/17)  - octreotide discontinued 2/13, restarted evening of 2/13 for presumed hematemesis, discontinued 2/14  - coreg 6.25mg BID  - GI following    > follow up with GI for a repeat EGD with possible RFA in 3 months    > will need CMP and CBC 1 week after discharge (GI to set up)    Hepatic encephalopathy, improving  Decompensated alcohol related cirrhosis c/b esophageal varices   Portal HTN and splenomegaly  Coagulopathy due to liver disease  Thrombocytopenia due to liver disease, present on admission  Follows with Dr. Wood. MELD-Na 24 today  Etiology: Alcohol, diagnosed 4/2022  HE: lactulose BID, continue rifaximin  EV/GV: esophageal varices s/p banding 1/23/23, stable on EGD 2/12 without evidence of bleeding; on carvedilol 6.25mg BID  Coagulopathy: INR 1.48,  (suspect 171 on admission was hemoconcentration)  Ascites: None per RUQ 2/12, held PTA bumex 1 mg and spironolactone given ASHISH -> bumex restarted 2/16  SBP:  no h/o   HCC: last CT 1/2023, no evidence of  Liver transplant candidacy: Last drink 4/2022, follows with Dr. Wood. Ongoing liver transplant eval outpatient including Chemical dependency assessment and programming/Mental health referral, ongoing. CT angiogram, scheduled 4/2023. PFTs given history of tobacco use  - GI consulted, appreciate recs  - daily MELD labs  - NG placement for lactulose administration per [facility] protocol 2/12, NG tube removed 2/13 as patient's mental status improved and able to take meds PO  - <2g Na, <2L water, >1.5 g/kg/day protein diet  - Bcx NGTD    MELD-Na score: 25 at 2/17/2023  4:38 AM  MELD score: 19 at 2/17/2023  4:38 AM  Calculated from:  Serum Creatinine: 0.86 mg/dL (Using min of 1 mg/dL) at 2/17/2023  4:38 AM  Serum Sodium: 128 mmol/L at 2/17/2023  4:38 AM  Total Bilirubin: 5.4 mg/dL at 2/17/2023  4:38 AM  INR(ratio): 1.84 at 2/17/2023  4:38 AM  Age: 27 years    Lactic acidosis,  resolved  Lactate 2.8 on admission. Likely 2/2 hypovolemia in the setting of acute GIB. Given 500 ml w/ K shifting on 2/12, then 1u pRBC  - trend, give additional fluids/blood as needed     Severe hyperkalemia, resolved  K 5.9 -> 6.3 -> 6.2 -> 5.8 -> 5.3  Potentially due to absorption of blood from gastropathy, diet, and/or insulin deficient state. S/p multiple shifts 2/11-14. Received lokelma on 2/15 and kayexalate overnight.  - Lactulose to aid with BM's, as noted above  - avoid high potassium foods  - resuming bumex, holding spironolactone  - continue to monitor     ASHISH, improved  Hyponatremia  Hyperphosphatemia  Hypomagnesemia  Suspect pre-renal - hypovolemia secondary to blood loss and poor PO intake for past days and continued diuretic use. Also c/f HRS vs ATN. Cr 1.22 (baseline 0.8) on admission. Improved to baseline (0.81) on 2/15.  - trend BMPs  - held PTA diuretics, resuming bumex on 2/16 in setting of hypervolemia and resolved ASHISH     Leukocytosis  Suspect reactive in setting of UGIB. No focal exam or history otherwise. HDS, has been afebrile.  - 7 days of ceftriaxone for UGIB for SBP ppx as above  - trend   - Bcx NGTD    Hypervolemic hyponatremia  Na 133 on admission, has been slowly trending down. Edema in feet and ankles noted on 2/16, slightly improved with elevation of legs.   - PTA bumex 1mg PO daily  - holding PTA spironolactone given hyperkalemia  - salt restriction      CHRONIC & STABLE PROBLEMS    MDD/anxiety   Autism spectrum   Mother is his caretaker, lives with parents.   - continue PTA fluoxetine  - previously his mother, Leticia, has been able to stay with patient overnight which helps with mood/anxiety. This was discussed with charge RN    Chronic pain, musculoskeletal  - Holding PTA oxycodone (recently started by PCP to help w/ pain and promote working w/ PT) - concern about causing oversedation  - Gabapentin not a good option - previously caused excessive drowsiness  - Continue Tylenol,  "max 2 Gm daily  - Will discuss other agents    Malnutrition:   Level of malnutrition: Severe   - nutrition plan (see note from 2/17)       Diet: Snacks/Supplements Adult: Other; See comments below; With Meals  Combination Diet Regular Diet; 2 gm K Diet; High Consistent Carb (75 g CHO per Meal) Diet    DVT Prophylaxis: Pneumatic compression device  Negron Catheter: Not present  Fluids: None  Lines: None     Cardiac Monitoring: None  Code Status: Full Code      Clinically Significant Risk Factors        # Hyperkalemia: Highest K = 5.7 mmol/L in last 2 days, will monitor as appropriate  # Hyponatremia: Lowest Na = 127 mmol/L in last 2 days, will monitor as appropriate      # Hypoalbuminemia: Lowest albumin = 3.1 g/dL at 2/17/2023  4:38 AM, will monitor as appropriate   # Thrombocytopenia: Lowest platelets = 115 in last 2 days, will monitor for bleeding          # Overweight: Estimated body mass index is 25.79 kg/m  as calculated from the following:    Height as of an earlier encounter on 2/11/23: 1.651 m (5' 5\").    Weight as of this encounter: 70.3 kg (155 lb).   # Severe Malnutrition: based on nutrition assessment        Disposition Plan      Expected Discharge Date: 02/18/2023,  3:00 PM    Destination: home with family  Discharge Comments: Not medically ready. elevated K+ and hyperglycemic        The patient's care was discussed with the Attending Physician, Dr. Smith.    Narcisa Willard, MS3  Medicine Service, [facility] TEAM [address] [place] [address]  Securely message with Vocera (more info)  Text page via [facility] Paging/Directory   See signed in provider for up to date coverage information     Resident/Fellow Attestation   I, Radha Granado MD, was present with the medical/CHRISTA student who participated in the service and in the documentation of the note.  I have verified the history and personally performed the physical exam and medical decision making.  I agree with the assessment and " plan of care as documented in the note.      Radha Granado MD  PGY2  Date of Service (when I saw the patient): 02/17/23    ___________________________    Interval History   Nursing notes reviewed. No acute events overnight. Patient feels overall well this morning. Had blood sugars in 400s yesterday evening. Notes 6-8 nonbloody loose stools in the past 24 hours. Patient's mother thinks they are slightly more formed than they have been. Improved edema in feet/ankles. Appetite has been good. Continued achy pain in lower extremities, asking for oxycodone this morning. No further hematemesis, no chest pain, shortness of breath, or abdominal pain.    Physical Exam   Vital Signs: Temp: 98.4  F (36.9  C) Temp src: Oral BP: 112/61 Pulse: 81   Resp: 16 SpO2: 99 % O2 Device: None (Room air)    Weight: 155 lbs 0 oz    General Appearance: Awake, alert, NAD  Respiratory: CTAB, no wheezing, no increased WOB  Cardiovascular: RRR, III/VI systolic murmur, extremities warm and well perfused  GI: Soft, nontender, no fluid wave  Skin: No rash, 1+ edema in feet/ankles  Neuro: Oriented x3, no focal deficits. Tremor in bilateral upper extremities.    Medical Decision Making      Please see A&P for additional details of medical decision making.      Data     I have personally reviewed the following data over the past 24 hrs:    11.4 (H)  \   7.2 (L)   / 115 (L)     128 (L) 93 (L) 35.1 (H) /  242 (H)   5.0 29 0.86 \       ALT: 61 (H) AST: 60 (H) AP: 161 (H) TBILI: 5.4 (H)   ALB: 3.1 (L) TOT PROTEIN: 5.7 (L) LIPASE: N/A       INR:  1.84 (H) PTT:  N/A   D-dimer:  N/A Fibrinogen:  N/A       Ferritin:  N/A % Retic:  N/A LDH:  N/A       Imaging results reviewed over the past 24 hrs:   No results found for this or any previous visit (from the past 24 hour(s)).  EGD 2/12/23:  Impression:            - Esophageal ulcers with no bleeding and no stigmata                          of recent bleeding.                          - Small (< 5 mm) esophageal  varices.                          - Portal hypertensive gastropathy.                          - Normal examined duodenum.                          - Possible that he bled from post-banding ulcers, but                          no stigmata to make this highly likely. Diffuse oozing                          noted from portal hypertensive gastropathy was more                          likely source

## 2023-02-18 LAB
ALBUMIN SERPL BCG-MCNC: 3.1 G/DL (ref 3.5–5.2)
ALP SERPL-CCNC: 153 U/L (ref 40–129)
ALT SERPL W P-5'-P-CCNC: 57 U/L (ref 10–50)
ANION GAP SERPL CALCULATED.3IONS-SCNC: 11 MMOL/L (ref 7–15)
ANION GAP SERPL CALCULATED.3IONS-SCNC: 9 MMOL/L (ref 7–15)
AST SERPL W P-5'-P-CCNC: 67 U/L (ref 10–50)
BILIRUB SERPL-MCNC: 4.7 MG/DL
BUN SERPL-MCNC: 46 MG/DL (ref 6–20)
BUN SERPL-MCNC: 50.9 MG/DL (ref 6–20)
CALCIUM SERPL-MCNC: 8.7 MG/DL (ref 8.6–10)
CALCIUM SERPL-MCNC: 9.1 MG/DL (ref 8.6–10)
CHLORIDE SERPL-SCNC: 89 MMOL/L (ref 98–107)
CHLORIDE SERPL-SCNC: 91 MMOL/L (ref 98–107)
CREAT SERPL-MCNC: 1.32 MG/DL (ref 0.67–1.17)
CREAT SERPL-MCNC: 1.45 MG/DL (ref 0.67–1.17)
DEPRECATED HCO3 PLAS-SCNC: 26 MMOL/L (ref 22–29)
DEPRECATED HCO3 PLAS-SCNC: 28 MMOL/L (ref 22–29)
ERYTHROCYTE [DISTWIDTH] IN BLOOD BY AUTOMATED COUNT: 19.7 % (ref 10–15)
GFR SERPL CREATININE-BSD FRML MDRD: 67 ML/MIN/1.73M2
GFR SERPL CREATININE-BSD FRML MDRD: 75 ML/MIN/1.73M2
GLUCOSE BLDC GLUCOMTR-MCNC: 159 MG/DL (ref 70–99)
GLUCOSE BLDC GLUCOMTR-MCNC: 169 MG/DL (ref 70–99)
GLUCOSE BLDC GLUCOMTR-MCNC: 170 MG/DL (ref 70–99)
GLUCOSE BLDC GLUCOMTR-MCNC: 170 MG/DL (ref 70–99)
GLUCOSE BLDC GLUCOMTR-MCNC: 173 MG/DL (ref 70–99)
GLUCOSE BLDC GLUCOMTR-MCNC: 187 MG/DL (ref 70–99)
GLUCOSE BLDC GLUCOMTR-MCNC: 195 MG/DL (ref 70–99)
GLUCOSE BLDC GLUCOMTR-MCNC: 203 MG/DL (ref 70–99)
GLUCOSE BLDC GLUCOMTR-MCNC: 204 MG/DL (ref 70–99)
GLUCOSE BLDC GLUCOMTR-MCNC: 211 MG/DL (ref 70–99)
GLUCOSE BLDC GLUCOMTR-MCNC: 226 MG/DL (ref 70–99)
GLUCOSE BLDC GLUCOMTR-MCNC: 231 MG/DL (ref 70–99)
GLUCOSE BLDC GLUCOMTR-MCNC: 236 MG/DL (ref 70–99)
GLUCOSE BLDC GLUCOMTR-MCNC: 249 MG/DL (ref 70–99)
GLUCOSE BLDC GLUCOMTR-MCNC: 307 MG/DL (ref 70–99)
GLUCOSE BLDC GLUCOMTR-MCNC: 311 MG/DL (ref 70–99)
GLUCOSE BLDC GLUCOMTR-MCNC: 341 MG/DL (ref 70–99)
GLUCOSE SERPL-MCNC: 207 MG/DL (ref 70–99)
GLUCOSE SERPL-MCNC: 237 MG/DL (ref 70–99)
HCT VFR BLD AUTO: 22.1 % (ref 40–53)
HGB BLD-MCNC: 7.9 G/DL (ref 13.3–17.7)
INR PPP: 1.66 (ref 0.85–1.15)
MCH RBC QN AUTO: 36.4 PG (ref 26.5–33)
MCHC RBC AUTO-ENTMCNC: 35.7 G/DL (ref 31.5–36.5)
MCV RBC AUTO: 102 FL (ref 78–100)
PLATELET # BLD AUTO: 126 10E3/UL (ref 150–450)
POTASSIUM SERPL-SCNC: 5.1 MMOL/L (ref 3.4–5.3)
POTASSIUM SERPL-SCNC: 5.4 MMOL/L (ref 3.4–5.3)
POTASSIUM SERPL-SCNC: 5.5 MMOL/L (ref 3.4–5.3)
POTASSIUM SERPL-SCNC: 5.5 MMOL/L (ref 3.4–5.3)
POTASSIUM SERPL-SCNC: 5.8 MMOL/L (ref 3.4–5.3)
POTASSIUM SERPL-SCNC: 5.9 MMOL/L (ref 3.4–5.3)
PROT SERPL-MCNC: 5.8 G/DL (ref 6.4–8.3)
RBC # BLD AUTO: 2.17 10E6/UL (ref 4.4–5.9)
SODIUM SERPL-SCNC: 126 MMOL/L (ref 136–145)
SODIUM SERPL-SCNC: 128 MMOL/L (ref 136–145)
UUN UR-MCNC: 802 MG/DL (ref 801–1666)
WBC # BLD AUTO: 12.2 10E3/UL (ref 4–11)

## 2023-02-18 PROCEDURE — 250N000012 HC RX MED GY IP 250 OP 636 PS 637: Performed by: STUDENT IN AN ORGANIZED HEALTH CARE EDUCATION/TRAINING PROGRAM

## 2023-02-18 PROCEDURE — 84132 ASSAY OF SERUM POTASSIUM: CPT

## 2023-02-18 PROCEDURE — 80053 COMPREHEN METABOLIC PANEL: CPT

## 2023-02-18 PROCEDURE — 250N000013 HC RX MED GY IP 250 OP 250 PS 637

## 2023-02-18 PROCEDURE — 250N000011 HC RX IP 250 OP 636

## 2023-02-18 PROCEDURE — 84540 ASSAY OF URINE/UREA-N: CPT

## 2023-02-18 PROCEDURE — 250N000013 HC RX MED GY IP 250 OP 250 PS 637: Performed by: STUDENT IN AN ORGANIZED HEALTH CARE EDUCATION/TRAINING PROGRAM

## 2023-02-18 PROCEDURE — 85610 PROTHROMBIN TIME: CPT

## 2023-02-18 PROCEDURE — 85027 COMPLETE CBC AUTOMATED: CPT

## 2023-02-18 PROCEDURE — 99233 SBSQ HOSP IP/OBS HIGH 50: CPT | Mod: GC | Performed by: INTERNAL MEDICINE

## 2023-02-18 PROCEDURE — 36415 COLL VENOUS BLD VENIPUNCTURE: CPT

## 2023-02-18 PROCEDURE — 99232 SBSQ HOSP IP/OBS MODERATE 35: CPT | Performed by: STUDENT IN AN ORGANIZED HEALTH CARE EDUCATION/TRAINING PROGRAM

## 2023-02-18 PROCEDURE — 250N000012 HC RX MED GY IP 250 OP 636 PS 637

## 2023-02-18 PROCEDURE — 93010 ELECTROCARDIOGRAM REPORT: CPT | Performed by: INTERNAL MEDICINE

## 2023-02-18 PROCEDURE — 258N000001 HC RX 258

## 2023-02-18 PROCEDURE — P9047 ALBUMIN (HUMAN), 25%, 50ML: HCPCS

## 2023-02-18 PROCEDURE — 93005 ELECTROCARDIOGRAM TRACING: CPT

## 2023-02-18 PROCEDURE — 120N000003 HC R&B IMCU UMMC

## 2023-02-18 RX ORDER — DEXTROSE MONOHYDRATE 25 G/50ML
25 INJECTION, SOLUTION INTRAVENOUS ONCE
Status: COMPLETED | OUTPATIENT
Start: 2023-02-18 | End: 2023-02-18

## 2023-02-18 RX ORDER — ALBUMIN (HUMAN) 12.5 G/50ML
50 SOLUTION INTRAVENOUS ONCE
Status: COMPLETED | OUTPATIENT
Start: 2023-02-18 | End: 2023-02-18

## 2023-02-18 RX ORDER — CALCIUM GLUCONATE 94 MG/ML
1 INJECTION, SOLUTION INTRAVENOUS ONCE
Status: COMPLETED | OUTPATIENT
Start: 2023-02-18 | End: 2023-02-18

## 2023-02-18 RX ADMIN — OXYCODONE HYDROCHLORIDE 5 MG: 5 TABLET ORAL at 10:43

## 2023-02-18 RX ADMIN — LACTULOSE 20 G: 10 POWDER, FOR SOLUTION ORAL at 10:34

## 2023-02-18 RX ADMIN — CARVEDILOL 6.25 MG: 6.25 TABLET, FILM COATED ORAL at 19:46

## 2023-02-18 RX ADMIN — OXYCODONE HYDROCHLORIDE 5 MG: 5 TABLET ORAL at 23:32

## 2023-02-18 RX ADMIN — SODIUM ZIRCONIUM CYCLOSILICATE 10 G: 10 POWDER, FOR SUSPENSION ORAL at 08:36

## 2023-02-18 RX ADMIN — INSULIN GLARGINE 28 UNITS: 100 INJECTION, SOLUTION SUBCUTANEOUS at 19:51

## 2023-02-18 RX ADMIN — DEXTROSE MONOHYDRATE 300 ML: 100 INJECTION, SOLUTION INTRAVENOUS at 08:37

## 2023-02-18 RX ADMIN — PANTOPRAZOLE SODIUM 40 MG: 40 TABLET, DELAYED RELEASE ORAL at 19:46

## 2023-02-18 RX ADMIN — INSULIN GLARGINE 28 UNITS: 100 INJECTION, SOLUTION SUBCUTANEOUS at 08:33

## 2023-02-18 RX ADMIN — SODIUM ZIRCONIUM CYCLOSILICATE 10 G: 10 POWDER, FOR SUSPENSION ORAL at 20:57

## 2023-02-18 RX ADMIN — FLUOXETINE 60 MG: 20 CAPSULE ORAL at 22:10

## 2023-02-18 RX ADMIN — ALBUMIN HUMAN 50 G: 0.25 SOLUTION INTRAVENOUS at 12:37

## 2023-02-18 RX ADMIN — HUMAN INSULIN 8 UNITS: 100 INJECTION, SOLUTION SUBCUTANEOUS at 08:33

## 2023-02-18 RX ADMIN — THIAMINE HCL TAB 100 MG 100 MG: 100 TAB at 10:34

## 2023-02-18 RX ADMIN — PANTOPRAZOLE SODIUM 40 MG: 40 TABLET, DELAYED RELEASE ORAL at 10:34

## 2023-02-18 RX ADMIN — CARVEDILOL 6.25 MG: 6.25 TABLET, FILM COATED ORAL at 10:34

## 2023-02-18 RX ADMIN — RIFAXIMIN 550 MG: 550 TABLET ORAL at 10:34

## 2023-02-18 RX ADMIN — DEXTROSE MONOHYDRATE 25 G: 25 INJECTION, SOLUTION INTRAVENOUS at 08:32

## 2023-02-18 RX ADMIN — LACTULOSE 20 G: 10 POWDER, FOR SOLUTION ORAL at 19:46

## 2023-02-18 RX ADMIN — HUMAN INSULIN 8 UNITS: 100 INJECTION, SOLUTION SUBCUTANEOUS at 20:57

## 2023-02-18 RX ADMIN — FOLIC ACID 1 MG: 1 TABLET ORAL at 10:34

## 2023-02-18 RX ADMIN — THERA TABS 1 TABLET: TAB at 10:34

## 2023-02-18 RX ADMIN — INSULIN ASPART 2 UNITS: 100 INJECTION, SOLUTION INTRAVENOUS; SUBCUTANEOUS at 18:25

## 2023-02-18 RX ADMIN — RIFAXIMIN 550 MG: 550 TABLET ORAL at 19:46

## 2023-02-18 RX ADMIN — CALCIUM GLUCONATE 1 G: 98 INJECTION, SOLUTION INTRAVENOUS at 08:33

## 2023-02-18 ASSESSMENT — ACTIVITIES OF DAILY LIVING (ADL)
ADLS_ACUITY_SCORE: 33

## 2023-02-18 NOTE — PROGRESS NOTES
[facility]    Progress Note - Medicine Service, MARO[facility] TEAM 2       Date of Admission:  2/11/2023    Assessment & Plan   Dillon Salter is a 27 year old male with history of Asperger's, alcoholic cirrhosis c/b esophageal varices and hepatic encephalopathy currently undergoing liver transplant evaluation at [facility], DM II who was directly admitted from [facility] for hematemesis, decompensated cirrhosis and hepatic encephalopathy. Hemodynamically stable with resolved encephalopathy. Course complicated by ASHISH.     Changes Today:  - ASHISH- hold bumex  - Hyperkalemia- shifted, lokelma x1, calcium gluconate   - albumin 25% 50g trial   - Increase glargine to 28units BID      ASHISH non-oliguric   Baseline Cr <1, now 1.37 today possibly related to restarting bumex, less likely HRS, but will hold diuresis and trial albumin bolus (not full albumin challenge). Cr elevated earlier on admission 1.22, thought secondary to blood loss and poor PO intake- diuretic held with improvement.   - Strict I/O  - Hold bumex  - Trend CMP  - Avoid nephrotoxic agents     Hyperkalemia, recurrent  Mild 5.8 12/18AM in setting of ASHISH. Has been as high as 6.3 this admission thought to be related to potential absorption of blood from gastropathy, diet, and/or insulin deficient state. Has received lokelama and kayexalate on several occassions, shifted with insulin multiple times.   - Trend K   - Additional lokelma if >5.5     Acute on chronic blood loss anemia- stable  Hematemesis- resolved  Hx of esophageal varices   Portal hypertensive gastropathy  Esophageal ulceration  Recurrent rectal varices  2-3 episodes hematemesis prior to ED of ~400 mL, ~325 mL at ER, 250 mL anu hematemesis since arrival. HDS. Given 1 unit pRBCs on 2/12 for Hgb of 6.9. INR 1.48. Esophageal varices recently banded 1/23/23. 2 20 gauge IVs in place. EGD 2/12 AM: diffuse oozing likely 2/2 portal hypertensive gastropathy, no  bleeding seen from recent banding, nonbleeding esophageal ulcers seen. No continued hematemesis. Transfused for Hgb 6.8 on 2/15.  - trend hgb daily and transfuse for <7  - pantoprazole 40mg BID  - ceftriaxone 1g qday for total of 7d  - octreotide discontinued 2/13, restarted evening of 2/13 for presumed hematemesis, discontinued 2/14  - coreg 6.25mg BID  - GI following    > follow up with GI for a repeat EGD with possible RFA in 3 months    > will need CMP and CBC 1 week after discharge (GI to set up)    Hepatic encephalopathy, improving  Decompensated alcohol related cirrhosis c/b esophageal varices   Portal HTN and splenomegaly  Coagulopathy due to liver disease  Thrombocytopenia due to liver disease, present on admission  Follows with Dr. Wood. MELD-Na 24 today  Etiology: Alcohol, diagnosed 4/2022  HE: lactulose BID, continue rifaximin  EV/GV: esophageal varices s/p banding 1/23/23, stable on EGD 2/12 without evidence of bleeding; on carvedilol 6.25mg BID  Coagulopathy: INR 1.48,  (suspect 171 on admission was hemoconcentration)  Ascites: None per RUQ 2/12, held PTA bumex 1 mg and spironolactone given ASHISH -> bumex restarted 2/16, held 2/18 with ASHISH  SBP:  no h/o   HCC: last CT 1/2023, no evidence of  Liver transplant candidacy: Last drink 4/2022, follows with Dr. Wood. Ongoing liver transplant eval outpatient including Chemical dependency assessment and programming/Mental health referral, ongoing. CT angiogram, scheduled 4/2023. PFTs given history of tobacco use  - GI consulted, appreciate recs  - daily MELD labs  - NG placement for lactulose administration per [facility] protocol 2/12, NG tube removed 2/13 as patient's mental status improved and able to take meds PO  - <2g Na, <2L water, >1.5 g/kg/day protein diet  - Bcx NGTD    MELD-Na score: 27 at 2/18/2023  5:48 AM  MELD score: 21 at 2/18/2023  5:48 AM  Calculated from:  Serum Creatinine: 1.32 mg/dL at 2/18/2023  5:48 AM  Serum Sodium: 128 mmol/L at  2/18/2023  5:48 AM  Total Bilirubin: 4.7 mg/dL at 2/18/2023  5:48 AM  INR(ratio): 1.66 at 2/18/2023  5:48 AM  Age: 27 years    Hyperglycemia, worsened  DM II - hgb A1c 5.6%  - glargine increased to 28 units BID on 2/18 (home dose is 20 units BID, have been titrating here)  - high dose sliding scale insulin  - hypoglycemia protocol    Leukocytosis  Suspect reactive in setting of UGIB. No focal exam or history otherwise. HDS, has been afebrile.  - on ceftriaxone for UGIB for SBP ppx as above  - trend   - Bcx NGTD    Hypervolemic hyponatremia  Na 133 on admission, has been slowly trending down. Edema in feet and ankles noted on 2/16, slightly improved with elevation of legs.   - resuming bumex  - holding spironolactone given hyperkalemia  - salt restriction    Lactic acidosis, resolved  Lactate 2.8 on admission. Likely 2/2 hypovolemia in the setting of acute GIB. Given 500 ml w/ K shifting on 2/12, then 1u pRBC  - trend, give additional fluids/blood as needed     CHRONIC & STABLE PROBLEMS  MDD/anxiety   Autism spectrum   Mother is his caretaker, lives with parents.   - continue PTA fluoxetine  - previously his mother, Leticia, has been able to stay with patient overnight which helps with mood/anxiety. This was discussed with charge RN    Chronic pain, musculoskeletal  - Continue PTA oxycodone 5mg BID PRN  - Gabapentin not a good option - previously caused excessive drowsiness  - Continue Tylenol, max 2 Gm daily  - Will discuss other agents    Malnutrition:   Level of malnutrition: Severe   - nutrition plan (see note from 2/14)  --> Discontinue clear liquid supplements  --> Add Ensure Plus (vanilla) at 2 PM and Special K protein bar once stephen, also at 2 PM per request       Diet: Snacks/Supplements Adult: Other; See comments below; With Meals  Combination Diet Regular Diet; 2 gm K Diet; High Consistent Carb (75 g CHO per Meal) Diet  Diet    DVT Prophylaxis: Pneumatic compression device  Negron Catheter: Not  present  Fluids: None  Lines: None     Cardiac Monitoring: None  Code Status: Full Code         Disposition Plan      Expected Discharge Date: 02/20/2023      Destination: home with family  Discharge Comments: New ASHISH and worsening hyperkalemia        The patient's care was discussed with the Attending Physician, Dr. Smith.    Izabela Leon MD  Internal Medicine PGY2     ___________________________    Interval History   Nursing notes reviewed. No acute events overnight. Patient feels overall well this morning, denies any specific complaints. Denies chest pain, shortness of breath, abdominal pain, hematochezia or melena- is having at least 3BM daily. Appetite has been good. Patient notes continued achy pain in lower extremities, for which oxycodone helps. No further hematemesis..     Physical Exam   Vital Signs: Temp: 98.7  F (37.1  C) Temp src: Oral BP: 138/86 Pulse: 79   Resp: 16 SpO2: 99 % O2 Device: None (Room air)    Weight: 162 lbs 4.14 oz    General Appearance: Awake, alert, NAD  Respiratory: CTAB, no wheezing, no increased WOB  Cardiovascular: RRR, III/VI systolic murmur, extremities warm and well perfused  GI: Soft, nontender, no fluid wave  Skin: No rash, some bruising on the lower extremities, 1+ edema in feet/ankles  Neuro: Oriented x3, no focal deficits. Tremor in bilateral upper extremities.    Medical Decision Making      Please see A&P for additional details of medical decision making.      Data   All labs and imaging reviewed

## 2023-02-18 NOTE — PLAN OF CARE
Neuro: A&Ox4. Able to make needs known. Slight confusion to situation when first awoken - easily re orientated.   Cardiac: No tele orders. Afebrile. VSS.   Respiratory: Sating >95% on RA.  GI/: Adequate urine output. BM x1 this shift.  Diet/appetite: Tolerating high consistent carb/low potassium diet. Eating well. Carb coverage insulin order in place.  Activity: Up ad melissa in room, SBA in halls.  Pain: At acceptable level on current regimen. PRN oxy x1.  Skin: No new deficits noted.  LDA's: L PIV - SL    Plan: Continue with POC. Notify primary team with changes. Possible discharge today pending bg levels, AM K+, and hgb levels.     Mark Moctezuma RN on 2/18/2023 at 6:43 AM

## 2023-02-18 NOTE — PLAN OF CARE
"Neuro: A&Ox4.   Cardiac:VSS.   Respiratory: on RA.  GI/: Adequate urine output. BM X1  Diet/appetite: Tolerating cons carb diet. Eating well.  Activity:  Assist of adlib, up to chair and sba in halls.  Pain: At acceptable level on current regimen.   Skin: No new deficits noted. Shower today  LDA's:piv      Plan: Continue with POC. Notify primary team with changes.      Goal Outcome Evaluation:      Plan of Care Reviewed With: patient    Overall Patient Progress: improvingOverall Patient Progress: improving         Problem: Plan of Care - These are the overarching goals to be used throughout the patient stay.    Goal: Plan of Care Review  Description: The Plan of Care Review/Shift note should be completed every shift.  The Outcome Evaluation is a brief statement about your assessment that the patient is improving, declining, or no change.  This information will be displayed automatically on your shift note.  Outcome: Progressing  Flowsheets (Taken 2/17/2023 1818)  Plan of Care Reviewed With: patient  Overall Patient Progress: improving  Goal: Patient-Specific Goal (Individualized)  Description: You can add care plan individualizations to a care plan. Examples of Individualization might be:  \"Parent requests to be called daily at 9am for status\", \"I have a hard time hearing out of my right ear\", or \"Do not touch me to wake me up as it startles me\".  Outcome: Progressing  Goal: Absence of Hospital-Acquired Illness or Injury  Outcome: Progressing  Intervention: Identify and Manage Fall Risk  Recent Flowsheet Documentation  Taken 2/17/2023 1600 by Ina Lucas, RN  Safety Promotion/Fall Prevention:   assistive device/personal items within reach   clutter free environment maintained   fall prevention program maintained   lighting adjusted   nonskid shoes/slippers when out of bed   patient and family education   room door open   room near nurse's station  Taken 2/17/2023 0800 by Ina Lucas, RN  Safety Promotion/Fall " Prevention:   assistive device/personal items within reach   clutter free environment maintained   fall prevention program maintained   lighting adjusted   nonskid shoes/slippers when out of bed   patient and family education   room door open   room near nurse's station  Intervention: Prevent Skin Injury  Recent Flowsheet Documentation  Taken 2/17/2023 1600 by Ina Lucas RN  Body Position: position changed independently  Taken 2/17/2023 0800 by Ina Lucas RN  Body Position: position changed independently  Intervention: Prevent and Manage VTE (Venous Thromboembolism) Risk  Recent Flowsheet Documentation  Taken 2/17/2023 1600 by Ina Lucas RN  VTE Prevention/Management: SCDs (sequential compression devices) off  Taken 2/17/2023 0800 by Ina Lucas RN  VTE Prevention/Management: SCDs (sequential compression devices) off  Goal: Optimal Comfort and Wellbeing  Outcome: Progressing  Intervention: Monitor Pain and Promote Comfort  Recent Flowsheet Documentation  Taken 2/17/2023 1017 by Ina Lucas RN  Pain Management Interventions: medication (see MAR)  Intervention: Provide Person-Centered Care  Recent Flowsheet Documentation  Taken 2/17/2023 1600 by Ina Lucas RN  Trust Relationship/Rapport:   care explained   choices provided   questions answered  Taken 2/17/2023 0800 by Ina Lucas RN  Trust Relationship/Rapport:   care explained   choices provided   questions answered  Goal: Readiness for Transition of Care  Outcome: Progressing     Problem: Malnutrition  Goal: Improved Nutritional Intake  Outcome: Progressing     Problem: Electrolyte Imbalance  Goal: Electrolyte Imbalance: Plan of Care  Outcome: Progressing     Problem: Liver Failure  Goal: Optimal Coping with Liver Failure  Outcome: Progressing  Intervention: Support Response to Liver Disease  Recent Flowsheet Documentation  Taken 2/17/2023 1600 by Ina Lucas RN  Family/Support System Care: involvement promoted  Taken 2/17/2023 0800 by  Klessig, Ina, RN  Family/Support System Care: involvement promoted  Goal: Fluid and Electrolyte Balance  Outcome: Progressing  Goal: Optimal Gastrointestinal Function  Outcome: Progressing  Intervention: Monitor and Support Gastrointestinal Function  Recent Flowsheet Documentation  Taken 2/17/2023 1600 by Ina Lucas RN  Body Position: position changed independently  Taken 2/17/2023 0800 by Ina Lucas RN  Body Position: position changed independently  Goal: Optimal Coagulation Function  Outcome: Progressing  Goal: Absence of Infection Signs and Symptoms  Outcome: Progressing  Goal: Optimal Neurologic Function  Outcome: Progressing  Goal: Improved Oral Intake  Outcome: Progressing  Goal: Optimal Pain Control  Outcome: Progressing  Intervention: Prevent or Manage Pain  Recent Flowsheet Documentation  Taken 2/17/2023 1017 by Ina Lucas RN  Pain Management Interventions: medication (see MAR)  Goal: Optimize Renal Function  Outcome: Progressing  Goal: Effective Oxygenation and Ventilation  Outcome: Progressing  Intervention: Promote Airway Secretion Clearance  Recent Flowsheet Documentation  Taken 2/17/2023 1600 by Ina Lucas RN  Cough And Deep Breathing: done independently per patient  Activity Management: ambulated in room  Taken 2/17/2023 0800 by Ina Lucas RN  Cough And Deep Breathing: done independently per patient  Activity Management: ambulated in room  Intervention: Optimize Oxygenation and Ventilation  Recent Flowsheet Documentation  Taken 2/17/2023 1600 by Ina Lucas RN  Head of Bed (HOB) Positioning: HOB at 30 degrees  Taken 2/17/2023 0800 by Ina Lucas RN  Head of Bed (HOB) Positioning: HOB at 30 degrees

## 2023-02-18 NOTE — PLAN OF CARE
Goal Outcome Evaluation:    Neuro: A&Ox4. Able to make needs known.  Cardiac: No tele orders. Afebrile. VSS.           Respiratory: Sating >95% on RA.  GI/: Adequate urine output. BM x2 this shift.  Diet/appetite: Tolerating high consistent carb/low potassium diet. Eating well. Carb coverage insulin order in place.  Activity: Up ad melissa in room, SBA in halls.  Pain: At acceptable level on current regimen. PRN oxy x1.  Skin: No new deficits noted.  LDA's: L PIV - SL     Plan: Serum potassium came back at 5.8 this AM, dextrose, insulin and lokelma given and ordered. Also 2x albumin administered.

## 2023-02-18 NOTE — PROGRESS NOTES
RNCC contacted the bedside nurse to see if th ept is a potential discharge for today. The bedside RN informed me no, due to the K+ level and the providers wanting to monitor the levels for one additional day.  Jewels Forde, RN, BSN, PHN  Weekend/Holiday RNCC Pager [phone]

## 2023-02-18 NOTE — PROGRESS NOTES
GASTROENTEROLOGY PROGRESS NOTE    Date: 02/18/2023     ASSESSMENT: 27 year old male with a medical history of Asperger's, T2DM, alcohol related cirrhosis c/b hepatic encephalopathy, history of esophageal varices bleeding (5/2022, 1/2023) and rectal varices s/p sclerotherapy 1/2023, ascites presenting with multiple episodes of hematemesis. Underwent EGD 2/11 showed oozing portal hypertensive gastropathy, and healed esophageal ulcer.    Portal hypertensive gastropathy, bleeding, improved  EGD showed oozing portal hypertensive gastropathy. Starting on-selective b-blocker for PHG and also for  history of esophageal varices bleeding.    Decompensated cirrhosis with hepatic encephalopathy, h/o esophageal variceal bleed, HE, MELD-Na 28  Rectal varices, s/p sclerotherapy 1/2023  Protein calories malnutrition  Follows with Dr. Wood. Has PEth positive 1/26/23 in the setting of post transfusion (which could cause false positive level). Patient reports last drink 4/2022 and recently started on alc rehab program in Jan 2023.     ASHISH, improved Likely from acute blood loss.     RECOMMENDATIONS:  - Diet as tolerates, 2 g low sodium diet; ensure high protein intake  - continue carvedilol 6.25 mg po BID. BP has been high normal with this  - lactulose titrate to 3 BMs/day, and rifaximin  - ensure adequate protein calories intake with protein supplement at least BID  - PT/OT  - Agree with holding bumex given slightly increased creatinine, recommend compression wraps.    GI will schedule EGD with possible RFA in 3 months for further treatment of portal hypertensive gastropathy    Gastroenterology follow up recommendations: Dr. Wood scheduled for 4/18/23      Lillie Flynn MD  Transplant Hepatology  _______________________________________________________________    Subjective: No overt blood loss  Creatinine slightly up on bumex  Reports leg swelling still there but better    Objective:  Blood pressure 138/86, pulse 79, temperature  98.7  F (37.1  C), temperature source Oral, resp. rate 16, weight 73.6 kg (162 lb 4.1 oz), SpO2 99 %.    Gen: A&Ox3, NAD  HEENT: sclera icteric  CV: RRR  Lungs: breathing comfortably on room air  Abd: soft, nontender, nondistended  Skin: no jaundice, no stigmata of chronic liver disease  MS: 2+ LE edema  Neuro: non focal, no asterixis    LABS:  BMP  Recent Labs   Lab 02/18/23  0822 02/18/23  0604 02/18/23  0548 02/18/23  0407 02/17/23  0745 02/17/23  0438 02/16/23  1841 02/16/23  1826 02/16/23  0717 02/16/23  0543   NA  --   --  128*  --   --  128*  --  129*  --  127*   POTASSIUM  --   --  5.8*  --   --  5.0  --  5.0  --  5.4*   CHLORIDE  --   --  91*  --   --  93*  --  93*  --  94*   SARTHAK  --   --  8.7  --   --  8.5*  --  8.9  --  8.6   CO2  --   --  28  --   --  29  --  28  --  24   BUN  --   --  46.0*  --   --  35.1*  --  33.2*  --  33.2*   CR  --   --  1.32*  --   --  0.86  --  0.79  --  0.81   * 204* 207* 249*   < > 277*   < > 497*   < > 390*    < > = values in this interval not displayed.     CBC  Recent Labs   Lab 02/18/23  0548 02/17/23  0438 02/16/23  0543 02/15/23  0554   WBC 12.2* 11.4* 14.2* 12.6*   RBC 2.17* 2.14* 2.48* 2.03*   HGB 7.9* 7.2* 8.3* 6.8*   HCT 22.1* 21.6* 25.4* 20.4*   * 101* 102* 101*   MCH 36.4* 33.6* 33.5* 33.5*   MCHC 35.7 33.3 32.7 33.3   RDW 19.7* 19.9* 19.9* 17.8*   * 115* 135* 105*     INR  Recent Labs   Lab 02/18/23  0548 02/17/23  0438 02/16/23  0543 02/15/23  0554   INR 1.66* 1.84* 1.92* 2.15*     LFTs  Recent Labs   Lab 02/18/23  0548 02/17/23  0438 02/16/23  0543 02/15/23  0554   ALKPHOS 153* 161* 168* 120   AST 67* 60* 74* 53*   ALT 57* 61* 73* 50   BILITOTAL 4.7* 5.4* 6.8* 5.1*   PROTTOTAL 5.8* 5.7* 6.3* 5.9*   ALBUMIN 3.1* 3.1* 3.4* 3.2*      PANC  Recent Labs   Lab 02/11/23  1852   LIPASE <9   MELD-Na score: 27 at 2/18/2023  5:48 AM  MELD score: 21 at 2/18/2023  5:48 AM  Calculated from:  Serum Creatinine: 1.32 mg/dL at 2/18/2023  5:48 AM  Serum Sodium:  128 mmol/L at 2/18/2023  5:48 AM  Total Bilirubin: 4.7 mg/dL at 2/18/2023  5:48 AM  INR(ratio): 1.66 at 2/18/2023  5:48 AM  Age: 27 years    IMAGING:  Reviewed.

## 2023-02-19 ENCOUNTER — APPOINTMENT (OUTPATIENT)
Dept: OCCUPATIONAL THERAPY | Facility: CLINIC | Age: 28
DRG: 005 | End: 2023-02-19
Attending: STUDENT IN AN ORGANIZED HEALTH CARE EDUCATION/TRAINING PROGRAM
Payer: COMMERCIAL

## 2023-02-19 LAB
ALBUMIN SERPL BCG-MCNC: 3.7 G/DL (ref 3.5–5.2)
ALBUMIN UR-MCNC: 30 MG/DL
ALP SERPL-CCNC: 161 U/L (ref 40–129)
ALT SERPL W P-5'-P-CCNC: 54 U/L (ref 10–50)
ANION GAP SERPL CALCULATED.3IONS-SCNC: 9 MMOL/L (ref 7–15)
APPEARANCE UR: CLEAR
AST SERPL W P-5'-P-CCNC: 75 U/L (ref 10–50)
BACTERIA #/AREA URNS HPF: ABNORMAL /HPF
BILIRUB SERPL-MCNC: 5.1 MG/DL
BILIRUB UR QL STRIP: NEGATIVE
BUN SERPL-MCNC: 55.7 MG/DL (ref 6–20)
CALCIUM SERPL-MCNC: 9.3 MG/DL (ref 8.6–10)
CAOX CRY #/AREA URNS HPF: ABNORMAL /HPF
CHLORIDE SERPL-SCNC: 89 MMOL/L (ref 98–107)
COLOR UR AUTO: YELLOW
CREAT SERPL-MCNC: 1.5 MG/DL (ref 0.67–1.17)
CREAT SERPL-MCNC: 1.72 MG/DL (ref 0.67–1.17)
CREAT UR-MCNC: 112 MG/DL
DEPRECATED HCO3 PLAS-SCNC: 27 MMOL/L (ref 22–29)
ERYTHROCYTE [DISTWIDTH] IN BLOOD BY AUTOMATED COUNT: 20 % (ref 10–15)
FRACT EXCRET NA UR+SERPL-RTO: NORMAL %
GFR SERPL CREATININE-BSD FRML MDRD: 55 ML/MIN/1.73M2
GFR SERPL CREATININE-BSD FRML MDRD: 64 ML/MIN/1.73M2
GLUCOSE BLDC GLUCOMTR-MCNC: 125 MG/DL (ref 70–99)
GLUCOSE BLDC GLUCOMTR-MCNC: 130 MG/DL (ref 70–99)
GLUCOSE BLDC GLUCOMTR-MCNC: 138 MG/DL (ref 70–99)
GLUCOSE BLDC GLUCOMTR-MCNC: 139 MG/DL (ref 70–99)
GLUCOSE BLDC GLUCOMTR-MCNC: 149 MG/DL (ref 70–99)
GLUCOSE BLDC GLUCOMTR-MCNC: 160 MG/DL (ref 70–99)
GLUCOSE BLDC GLUCOMTR-MCNC: 160 MG/DL (ref 70–99)
GLUCOSE BLDC GLUCOMTR-MCNC: 178 MG/DL (ref 70–99)
GLUCOSE BLDC GLUCOMTR-MCNC: 193 MG/DL (ref 70–99)
GLUCOSE BLDC GLUCOMTR-MCNC: 195 MG/DL (ref 70–99)
GLUCOSE BLDC GLUCOMTR-MCNC: 196 MG/DL (ref 70–99)
GLUCOSE BLDC GLUCOMTR-MCNC: 196 MG/DL (ref 70–99)
GLUCOSE BLDC GLUCOMTR-MCNC: 205 MG/DL (ref 70–99)
GLUCOSE BLDC GLUCOMTR-MCNC: 217 MG/DL (ref 70–99)
GLUCOSE BLDC GLUCOMTR-MCNC: 56 MG/DL (ref 70–99)
GLUCOSE BLDC GLUCOMTR-MCNC: 64 MG/DL (ref 70–99)
GLUCOSE BLDC GLUCOMTR-MCNC: 81 MG/DL (ref 70–99)
GLUCOSE BLDC GLUCOMTR-MCNC: 84 MG/DL (ref 70–99)
GLUCOSE BLDC GLUCOMTR-MCNC: 93 MG/DL (ref 70–99)
GLUCOSE SERPL-MCNC: 146 MG/DL (ref 70–99)
GLUCOSE UR STRIP-MCNC: NEGATIVE MG/DL
HCT VFR BLD AUTO: 21.7 % (ref 40–53)
HGB BLD-MCNC: 7.1 G/DL (ref 13.3–17.7)
HGB UR QL STRIP: NEGATIVE
HOLD SPECIMEN: NORMAL
HYALINE CASTS: 8 /LPF
INR PPP: 1.58 (ref 0.85–1.15)
KETONES UR STRIP-MCNC: NEGATIVE MG/DL
LEUKOCYTE ESTERASE UR QL STRIP: NEGATIVE
MCH RBC QN AUTO: 33.8 PG (ref 26.5–33)
MCHC RBC AUTO-ENTMCNC: 32.7 G/DL (ref 31.5–36.5)
MCV RBC AUTO: 103 FL (ref 78–100)
MUCOUS THREADS #/AREA URNS LPF: PRESENT /LPF
NITRATE UR QL: NEGATIVE
PH UR STRIP: 5 [PH] (ref 5–7)
PLATELET # BLD AUTO: 127 10E3/UL (ref 150–450)
POTASSIUM SERPL-SCNC: 5.7 MMOL/L (ref 3.4–5.3)
POTASSIUM SERPL-SCNC: 5.8 MMOL/L (ref 3.4–5.3)
POTASSIUM SERPL-SCNC: 5.9 MMOL/L (ref 3.4–5.3)
POTASSIUM SERPL-SCNC: 6 MMOL/L (ref 3.4–5.3)
POTASSIUM SERPL-SCNC: 6.1 MMOL/L (ref 3.4–5.3)
POTASSIUM SERPL-SCNC: 6.3 MMOL/L (ref 3.4–5.3)
PROT SERPL-MCNC: 6.3 G/DL (ref 6.4–8.3)
RBC # BLD AUTO: 2.1 10E6/UL (ref 4.4–5.9)
RBC URINE: 26 /HPF
SODIUM SERPL-SCNC: 124 MMOL/L (ref 136–145)
SODIUM SERPL-SCNC: 125 MMOL/L (ref 136–145)
SODIUM UR-SCNC: <20 MMOL/L
SP GR UR STRIP: 1.01 (ref 1–1.03)
SQUAMOUS EPITHELIAL: 19 /HPF
UROBILINOGEN UR STRIP-MCNC: NORMAL MG/DL
WBC # BLD AUTO: 11.7 10E3/UL (ref 4–11)
WBC URINE: 16 /HPF
YEAST #/AREA URNS HPF: ABNORMAL /HPF

## 2023-02-19 PROCEDURE — 80053 COMPREHEN METABOLIC PANEL: CPT

## 2023-02-19 PROCEDURE — 84300 ASSAY OF URINE SODIUM: CPT

## 2023-02-19 PROCEDURE — 85027 COMPLETE CBC AUTOMATED: CPT

## 2023-02-19 PROCEDURE — 84132 ASSAY OF SERUM POTASSIUM: CPT

## 2023-02-19 PROCEDURE — 87086 URINE CULTURE/COLONY COUNT: CPT

## 2023-02-19 PROCEDURE — 36415 COLL VENOUS BLD VENIPUNCTURE: CPT

## 2023-02-19 PROCEDURE — 250N000013 HC RX MED GY IP 250 OP 250 PS 637

## 2023-02-19 PROCEDURE — 258N000001 HC RX 258

## 2023-02-19 PROCEDURE — 84295 ASSAY OF SERUM SODIUM: CPT | Performed by: INTERNAL MEDICINE

## 2023-02-19 PROCEDURE — 81001 URINALYSIS AUTO W/SCOPE: CPT

## 2023-02-19 PROCEDURE — 85610 PROTHROMBIN TIME: CPT

## 2023-02-19 PROCEDURE — 250N000011 HC RX IP 250 OP 636

## 2023-02-19 PROCEDURE — 97535 SELF CARE MNGMENT TRAINING: CPT | Mod: GO

## 2023-02-19 PROCEDURE — 80321 ALCOHOLS BIOMARKERS 1OR 2: CPT

## 2023-02-19 PROCEDURE — 82565 ASSAY OF CREATININE: CPT | Performed by: INTERNAL MEDICINE

## 2023-02-19 PROCEDURE — 99233 SBSQ HOSP IP/OBS HIGH 50: CPT | Mod: GC | Performed by: INTERNAL MEDICINE

## 2023-02-19 PROCEDURE — 250N000013 HC RX MED GY IP 250 OP 250 PS 637: Performed by: STUDENT IN AN ORGANIZED HEALTH CARE EDUCATION/TRAINING PROGRAM

## 2023-02-19 PROCEDURE — 97110 THERAPEUTIC EXERCISES: CPT | Mod: GO

## 2023-02-19 PROCEDURE — 93010 ELECTROCARDIOGRAM REPORT: CPT | Performed by: INTERNAL MEDICINE

## 2023-02-19 PROCEDURE — P9047 ALBUMIN (HUMAN), 25%, 50ML: HCPCS

## 2023-02-19 PROCEDURE — 120N000003 HC R&B IMCU UMMC

## 2023-02-19 PROCEDURE — 93005 ELECTROCARDIOGRAM TRACING: CPT

## 2023-02-19 RX ORDER — DEXTROSE MONOHYDRATE 25 G/50ML
25 INJECTION, SOLUTION INTRAVENOUS ONCE
Status: COMPLETED | OUTPATIENT
Start: 2023-02-19 | End: 2023-02-19

## 2023-02-19 RX ORDER — ALBUMIN (HUMAN) 12.5 G/50ML
75 SOLUTION INTRAVENOUS DAILY
Status: COMPLETED | OUTPATIENT
Start: 2023-02-19 | End: 2023-02-21

## 2023-02-19 RX ORDER — DEXTROSE MONOHYDRATE 25 G/50ML
25-50 INJECTION, SOLUTION INTRAVENOUS
Status: DISCONTINUED | OUTPATIENT
Start: 2023-02-19 | End: 2023-02-19

## 2023-02-19 RX ORDER — CALCIUM GLUCONATE 94 MG/ML
1 INJECTION, SOLUTION INTRAVENOUS ONCE
Status: COMPLETED | OUTPATIENT
Start: 2023-02-19 | End: 2023-02-19

## 2023-02-19 RX ORDER — SODIUM POLYSTYRENE SULFONATE 15 G/60ML
15 SUSPENSION ORAL; RECTAL ONCE
Status: COMPLETED | OUTPATIENT
Start: 2023-02-19 | End: 2023-02-19

## 2023-02-19 RX ORDER — CARVEDILOL 6.25 MG/1
6.25 TABLET ORAL ONCE
Status: COMPLETED | OUTPATIENT
Start: 2023-02-19 | End: 2023-02-19

## 2023-02-19 RX ORDER — NICOTINE POLACRILEX 4 MG
15-30 LOZENGE BUCCAL
Status: DISCONTINUED | OUTPATIENT
Start: 2023-02-19 | End: 2023-02-19

## 2023-02-19 RX ADMIN — DEXTROSE MONOHYDRATE 25 G: 25 INJECTION, SOLUTION INTRAVENOUS at 08:07

## 2023-02-19 RX ADMIN — DEXTROSE MONOHYDRATE 300 ML: 100 INJECTION, SOLUTION INTRAVENOUS at 16:36

## 2023-02-19 RX ADMIN — PANTOPRAZOLE SODIUM 40 MG: 40 TABLET, DELAYED RELEASE ORAL at 19:47

## 2023-02-19 RX ADMIN — DEXTROSE MONOHYDRATE 300 ML: 100 INJECTION, SOLUTION INTRAVENOUS at 22:02

## 2023-02-19 RX ADMIN — SODIUM ZIRCONIUM CYCLOSILICATE 10 G: 10 POWDER, FOR SUSPENSION ORAL at 19:52

## 2023-02-19 RX ADMIN — DEXTROSE MONOHYDRATE 300 ML: 100 INJECTION, SOLUTION INTRAVENOUS at 08:07

## 2023-02-19 RX ADMIN — LACTULOSE 20 G: 10 POWDER, FOR SOLUTION ORAL at 10:16

## 2023-02-19 RX ADMIN — FOLIC ACID 1 MG: 1 TABLET ORAL at 10:16

## 2023-02-19 RX ADMIN — DEXTROSE 15 G: 15 GEL ORAL at 16:32

## 2023-02-19 RX ADMIN — INSULIN GLARGINE 28 UNITS: 100 INJECTION, SOLUTION SUBCUTANEOUS at 19:52

## 2023-02-19 RX ADMIN — HUMAN INSULIN 8 UNITS: 100 INJECTION, SOLUTION SUBCUTANEOUS at 22:01

## 2023-02-19 RX ADMIN — SODIUM ZIRCONIUM CYCLOSILICATE 10 G: 10 POWDER, FOR SUSPENSION ORAL at 08:07

## 2023-02-19 RX ADMIN — DEXTROSE MONOHYDRATE 25 G: 25 INJECTION, SOLUTION INTRAVENOUS at 21:58

## 2023-02-19 RX ADMIN — CARVEDILOL 6.25 MG: 6.25 TABLET, FILM COATED ORAL at 10:16

## 2023-02-19 RX ADMIN — THERA TABS 1 TABLET: TAB at 10:16

## 2023-02-19 RX ADMIN — SODIUM POLYSTYRENE SULFONATE 15 G: 15 SUSPENSION ORAL; RECTAL at 22:06

## 2023-02-19 RX ADMIN — ALBUMIN HUMAN 75 G: 0.25 SOLUTION INTRAVENOUS at 10:15

## 2023-02-19 RX ADMIN — PANTOPRAZOLE SODIUM 40 MG: 40 TABLET, DELAYED RELEASE ORAL at 10:16

## 2023-02-19 RX ADMIN — SODIUM ZIRCONIUM CYCLOSILICATE 10 G: 10 POWDER, FOR SUSPENSION ORAL at 14:12

## 2023-02-19 RX ADMIN — FLUOXETINE 60 MG: 20 CAPSULE ORAL at 23:13

## 2023-02-19 RX ADMIN — DEXTROSE 30 G: 15 GEL ORAL at 15:27

## 2023-02-19 RX ADMIN — HUMAN INSULIN 8 UNITS: 100 INJECTION, SOLUTION SUBCUTANEOUS at 16:47

## 2023-02-19 RX ADMIN — RIFAXIMIN 550 MG: 550 TABLET ORAL at 19:47

## 2023-02-19 RX ADMIN — HUMAN INSULIN 10 UNITS: 100 INJECTION, SOLUTION SUBCUTANEOUS at 08:08

## 2023-02-19 RX ADMIN — THIAMINE HCL TAB 100 MG 100 MG: 100 TAB at 10:16

## 2023-02-19 RX ADMIN — LACTULOSE 20 G: 10 POWDER, FOR SOLUTION ORAL at 19:51

## 2023-02-19 RX ADMIN — OXYCODONE HYDROCHLORIDE 5 MG: 5 TABLET ORAL at 10:16

## 2023-02-19 RX ADMIN — DEXTROSE MONOHYDRATE 25 G: 25 INJECTION, SOLUTION INTRAVENOUS at 16:43

## 2023-02-19 RX ADMIN — RIFAXIMIN 550 MG: 550 TABLET ORAL at 10:16

## 2023-02-19 RX ADMIN — CALCIUM GLUCONATE 1 G: 98 INJECTION, SOLUTION INTRAVENOUS at 21:47

## 2023-02-19 RX ADMIN — INSULIN GLARGINE 28 UNITS: 100 INJECTION, SOLUTION SUBCUTANEOUS at 08:08

## 2023-02-19 ASSESSMENT — ACTIVITIES OF DAILY LIVING (ADL)
ADLS_ACUITY_SCORE: 33

## 2023-02-19 NOTE — PROGRESS NOTES
Brief Hepatology Note:    - Creatinine continues to slightly rise off diuretics. Recommend volume challenge ~ 75 grams albumin.  - Primary team working up hyperkalemia  - Blood pressure normal/high for cirrhosis, so do not think coreg is contributing to ASHISH but would hold coreg at this time

## 2023-02-19 NOTE — PLAN OF CARE
Neuro: A&Ox4. Able to make needs known.  Cardiac: Tele ordered & placed on patient. Afebrile. VSS.           Respiratory: Sating >95% on RA.  GI/: Adequate urine output. BM x1 this shift.  Diet/appetite: Tolerating high consistent carb/low potassium diet. Eating well. Carb coverage insulin order in place. Strict I/O.  Activity: Up ad melissa in room, SBA in halls.  Pain: At acceptable level on current regimen. PRN oxy x1.  Skin: No new deficits noted.  LDA's: L PIV - SL     Plan: Continue with POC. Notify primary team with changes. Serum potassium resulted at 5.5 at 9:00pm - new orders to shift with IV insulin & lokelma. Serum Potassium resulted at 5.7 at 11:00pm - provider aware & no new orders to shift.     Critical lab result potassium 6.1 this AM, provider notified.      Mark Moctezuma RN on 2/19/2023 at 6:53 AM

## 2023-02-19 NOTE — PROGRESS NOTES
[facility]    Progress Note - Medicine Service, MAROON TEAM 2       Date of Admission:  2/11/2023    Assessment & Plan   Dillon Salter is a 27 year old male with history of Asperger's, alcoholic cirrhosis c/b esophageal varices and hepatic encephalopathy currently undergoing liver transplant evaluation at [facility], DM II who was directly admitted from [facility] for hematemesis, decompensated cirrhosis and hepatic encephalopathy. Hemodynamically stable with resolved encephalopathy. Course complicated by ASHISH.     Changes Today:  - ASHISH- hold bumex  - Albumin challenge  - Trend K (shifted x2, lokelma TID today)   - Decrease glargine to 25BID (hypoglycemic today, but also received extra insulin)     ASHISH non-oliguric   Baseline Cr <1, acutely worsened 2/18 to 1.37 from 0.8, and now rising, possibly related to restarting bumex. With worsening, will trial albumin 1g/kg x3 days and continue to hold diuresis.   - Strict I/O  - Hold bumex, coreg (per GI, though less likely due to BP)  - Trend CMP  - Avoid nephrotoxic agents     Hyperkalemia, recurrent  Moderate 6.1 2/19AM, in setting of ASHISH. Has been as high as 6.3 this admission previously thought to be related to potential absorption of blood from gastropathy, and/or insulin deficient state, now most likely elevated in the setting of ASHISH. Has received lokelama and kayexalate on several occassions, shifted with insulin multiple times. No associated EKG changes on multiple checks.   - Trend K   - Lokelma 10g TID today     Acute on chronic blood loss anemia- stable  Hematemesis- resolved  Hx of esophageal varices   Portal hypertensive gastropathy  Esophageal ulceration  Recurrent rectal varices  2-3 episodes hematemesis prior to ED of ~400 mL, ~325 mL at ER, 250 mL anu hematemesis since arrival. HDS. Given 1 unit pRBCs on 2/12 for Hgb of 6.9. INR 1.48. Esophageal varices recently banded 1/23/23. 2 20 gauge IVs in  [facility] place. EGD 2/12 AM: diffuse oozing likely 2/2 portal hypertensive gastropathy, no bleeding seen from recent banding, nonbleeding esophageal ulcers seen. No continued hematemesis. Transfused for Hgb 6.8 on 2/15.  - trend hgb daily and transfuse for <7  - pantoprazole 40mg BID  - ceftriaxone 1g qday for total of 7d  - octreotide discontinued 2/13, restarted evening of 2/13 for presumed hematemesis, discontinued 2/14  - coreg 6.25mg BID  - GI following    > follow up with GI for a repeat EGD with possible RFA in 3 months    > will need CMP and CBC 1 week after discharge (GI to set up)    Hepatic encephalopathy, improving  Decompensated alcohol related cirrhosis c/b esophageal varices   Portal HTN and splenomegaly  Coagulopathy due to liver disease  Thrombocytopenia due to liver disease, present on admission  Follows with Dr. Wood. MELD-Na 24 today  Etiology: Alcohol, diagnosed 4/2022  HE: lactulose BID, continue rifaximin  EV/GV: esophageal varices s/p banding 1/23/23, stable on EGD 2/12 without evidence of bleeding; on carvedilol 6.25mg BID  Coagulopathy: INR 1.48,  (suspect 171 on admission was hemoconcentration)  Ascites: None per RUQ 2/12, held PTA bumex 1 mg and spironolactone given ASHISH -> bumex restarted 2/16, held 2/18 with ASHISH  SBP:  no h/o   HCC: last CT 1/2023, no evidence of  Liver transplant candidacy: Last drink 4/2022, follows with Dr. Wood. Ongoing liver transplant eval outpatient including Chemical dependency assessment and programming/Mental health referral, ongoing. CT angiogram, scheduled 4/2023. PFTs given history of tobacco use  - GI consulted, appreciate recs  - daily MELD labs  - NG placement for lactulose administration per [facility] protocol 2/12, NG tube removed 2/13 as patient's mental status improved and able to take meds PO  - <2g Na, <2L water, >1.5 g/kg/day protein diet  - Bcx NGTD    MELD-Na score: 29 at 2/19/2023  5:53 AM  MELD score: 22 at 2/19/2023  5:53 AM  Calculated  from:  Serum Creatinine: 1.50 mg/dL at 2/19/2023  5:53 AM  Serum Sodium: 125 mmol/L at 2/19/2023  5:53 AM  Total Bilirubin: 5.1 mg/dL at 2/19/2023  5:53 AM  INR(ratio): 1.58 at 2/19/2023  5:53 AM  Age: 27 years    Hyperglycemia, worsened  DM II - hgb A1c 5.6%  - glargine increased to 28 units BID on 2/18 (home dose is 20 units BID, have been titrating here)  - high dose sliding scale insulin  - hypoglycemia protocol    Leukocytosis  Suspect reactive in setting of UGIB. No focal exam or history otherwise. HDS, has been afebrile.  - on ceftriaxone for UGIB for SBP ppx as above  - trend   - Bcx NGTD    Hypervolemic hyponatremia  Na 133 on admission, has been slowly trending down. Edema in feet and ankles noted on 2/16, slightly improved with elevation of legs.   - resuming bumex  - holding spironolactone given hyperkalemia  - salt restriction    Lactic acidosis, resolved  Lactate 2.8 on admission. Likely 2/2 hypovolemia in the setting of acute GIB. Given 500 ml w/ K shifting on 2/12, then 1u pRBC  - trend, give additional fluids/blood as needed     CHRONIC & STABLE PROBLEMS  MDD/anxiety   Autism spectrum   Mother is his caretaker, lives with parents.   - continue PTA fluoxetine  - previously his mother, Leticia, has been able to stay with patient overnight which helps with mood/anxiety. This was discussed with charge RN    Chronic pain, musculoskeletal  - Continue PTA oxycodone 5mg BID PRN  - Gabapentin not a good option - previously caused excessive drowsiness  - Continue Tylenol, max 2 Gm daily  - Will discuss other agents    Malnutrition:   Level of malnutrition: Severe   - nutrition plan (see note from 2/14)  --> Discontinue clear liquid supplements  --> Add Ensure Plus (vanilla) at 2 PM and Special K protein bar once stephen, also at 2 PM per request       Diet: Snacks/Supplements Adult: Other; See comments below; With Meals  Combination Diet Regular Diet; 2 gm K Diet; High Consistent Carb (75 g CHO per Meal)  "Diet  Diet    DVT Prophylaxis: Pneumatic compression device  Negron Catheter: Not present  Fluids: None  Lines: None     Cardiac Monitoring: ACTIVE order. Indication: Electrolyte Imbalance (24 hours)- Magnesium <1.3 mg/ml; Potassium < =2.8 or > 5.5 mg/ml  Code Status: Full Code         Disposition Plan      Expected Discharge Date: 02/21/2023      Destination: home with family  Discharge Comments: New ASHISH and worsening hyperkalemia        The patient's care was discussed with the Attending Physician, Dr. Smith.    Izabela Leon MD  Internal Medicine PGY2     ___________________________    Interval History   Nursing notes reviewed. No acute events overnight. Patient feels overall well this morning, denies any specific complaints including chest pain, shortness of breath, abdominal pain or any hematochezia/melena. Having at least 3BM daily.     \"It's like it's laughing in our face now!\" re:hyperkalemia worsening today      Physical Exam   Vital Signs: Temp: 98.8  F (37.1  C) Temp src: Oral BP: 123/62 Pulse: 78   Resp: 18 SpO2: 100 % O2 Device: None (Room air)    Weight: 169 lbs 8.54 oz    General Appearance: Awake, alert, NAD  Respiratory: CTAB, no wheezing, no increased WOB  Cardiovascular: RRR, III/VI systolic murmur, extremities warm and well perfused  GI: Soft, nontender, no fluid wave  Skin: No rash, some bruising on the lower extremities, 1+ edema in feet/ankles  Neuro: Oriented x3, no focal deficits. Tremor in bilateral upper extremities.    Medical Decision Making      Please see A&P for additional details of medical decision making.      Data   All labs and imaging reviewed   "

## 2023-02-19 NOTE — PROGRESS NOTES
Weekend On Call RN Care Management Follow Up    Length of Stay (days): 8    Expected Discharge Date: 02/21/2023     Concerns to be Addressed: discharge planning     Anticipated Discharge Disposition: Home,      Anticipated Discharge Services: Home care  Anticipated Discharge DME: None    Additional Information:  Per discussion with bedside RN and chart review, pt is not medically ready for discharge today.  Pt K+ is still elevated and need to be monitored for 1-2 more days.  Weekdays RNCC will cont to follow the plan of care.    [facility] (RN/PT/OT)  Ph: [phone]   Fax: [phone]    Johann Deluca RN, PHN, BSN  4A and 4E/ ICU  Care Coordinator  Phone: [phone]  Pager: [phone]    To contact the weekend RNCC  [place] (0800 - 1630) Saturday and Sunday   Units: 4A, 4C, 4E, 5A and 5B- Pager [phone]   Units: 6A, 6B- Pager [phone]   Unit : 6C, 6D- pager [phone]   Units: 7A, 7B, 7C, 7D, and 5C- Pager [phone]

## 2023-02-20 ENCOUNTER — APPOINTMENT (OUTPATIENT)
Dept: CARDIOLOGY | Facility: CLINIC | Age: 28
DRG: 005 | End: 2023-02-20
Payer: COMMERCIAL

## 2023-02-20 PROBLEM — N17.9 ACUTE KIDNEY FAILURE, UNSPECIFIED (H): Status: ACTIVE | Noted: 2023-02-20

## 2023-02-20 LAB
ABO/RH(D): NORMAL
ALBUMIN SERPL BCG-MCNC: 4.2 G/DL (ref 3.5–5.2)
ALP SERPL-CCNC: 133 U/L (ref 40–129)
ALT SERPL W P-5'-P-CCNC: 42 U/L (ref 10–50)
ANION GAP SERPL CALCULATED.3IONS-SCNC: 12 MMOL/L (ref 7–15)
ANTIBODY SCREEN: NEGATIVE
AST SERPL W P-5'-P-CCNC: 67 U/L (ref 10–50)
ATRIAL RATE - MUSE: 83 BPM
ATRIAL RATE - MUSE: 88 BPM
BASE EXCESS BLDV CALC-SCNC: 6.2 MMOL/L (ref -7.7–1.9)
BILIRUB SERPL-MCNC: 6.3 MG/DL
BLD PROD TYP BPU: NORMAL
BLOOD COMPONENT TYPE: NORMAL
BUN SERPL-MCNC: 64.3 MG/DL (ref 6–20)
CALCIUM SERPL-MCNC: 9.1 MG/DL (ref 8.6–10)
CHLORIDE SERPL-SCNC: 87 MMOL/L (ref 98–107)
CODING SYSTEM: NORMAL
CREAT SERPL-MCNC: 1.68 MG/DL (ref 0.67–1.17)
CROSSMATCH: NORMAL
DEPRECATED HCO3 PLAS-SCNC: 25 MMOL/L (ref 22–29)
DIASTOLIC BLOOD PRESSURE - MUSE: NORMAL MMHG
DIASTOLIC BLOOD PRESSURE - MUSE: NORMAL MMHG
ERYTHROCYTE [DISTWIDTH] IN BLOOD BY AUTOMATED COUNT: 20.1 % (ref 10–15)
FERRITIN SERPL-MCNC: 1335 NG/ML (ref 31–409)
GFR SERPL CREATININE-BSD FRML MDRD: 56 ML/MIN/1.73M2
GLUCOSE BLDC GLUCOMTR-MCNC: 105 MG/DL (ref 70–99)
GLUCOSE BLDC GLUCOMTR-MCNC: 109 MG/DL (ref 70–99)
GLUCOSE BLDC GLUCOMTR-MCNC: 144 MG/DL (ref 70–99)
GLUCOSE BLDC GLUCOMTR-MCNC: 144 MG/DL (ref 70–99)
GLUCOSE BLDC GLUCOMTR-MCNC: 145 MG/DL (ref 70–99)
GLUCOSE BLDC GLUCOMTR-MCNC: 148 MG/DL (ref 70–99)
GLUCOSE BLDC GLUCOMTR-MCNC: 154 MG/DL (ref 70–99)
GLUCOSE BLDC GLUCOMTR-MCNC: 158 MG/DL (ref 70–99)
GLUCOSE BLDC GLUCOMTR-MCNC: 167 MG/DL (ref 70–99)
GLUCOSE BLDC GLUCOMTR-MCNC: 168 MG/DL (ref 70–99)
GLUCOSE BLDC GLUCOMTR-MCNC: 175 MG/DL (ref 70–99)
GLUCOSE BLDC GLUCOMTR-MCNC: 176 MG/DL (ref 70–99)
GLUCOSE BLDC GLUCOMTR-MCNC: 184 MG/DL (ref 70–99)
GLUCOSE BLDC GLUCOMTR-MCNC: 184 MG/DL (ref 70–99)
GLUCOSE BLDC GLUCOMTR-MCNC: 210 MG/DL (ref 70–99)
GLUCOSE BLDC GLUCOMTR-MCNC: 223 MG/DL (ref 70–99)
GLUCOSE BLDC GLUCOMTR-MCNC: 231 MG/DL (ref 70–99)
GLUCOSE BLDC GLUCOMTR-MCNC: 232 MG/DL (ref 70–99)
GLUCOSE BLDC GLUCOMTR-MCNC: 99 MG/DL (ref 70–99)
GLUCOSE SERPL-MCNC: 164 MG/DL (ref 70–99)
HCO3 BLDV-SCNC: 32 MMOL/L (ref 21–28)
HCT VFR BLD AUTO: 20.3 % (ref 40–53)
HGB BLD-MCNC: 6.6 G/DL (ref 13.3–17.7)
HOLD SPECIMEN: NORMAL
INR PPP: 1.68 (ref 0.85–1.15)
INTERPRETATION ECG - MUSE: NORMAL
INTERPRETATION ECG - MUSE: NORMAL
ISSUE DATE AND TIME: NORMAL
LVEF ECHO: NORMAL
MCH RBC QN AUTO: 33.8 PG (ref 26.5–33)
MCHC RBC AUTO-ENTMCNC: 32.5 G/DL (ref 31.5–36.5)
MCV RBC AUTO: 104 FL (ref 78–100)
O2/TOTAL GAS SETTING VFR VENT: 0 %
P AXIS - MUSE: 53 DEGREES
P AXIS - MUSE: 58 DEGREES
PCO2 BLDV: 54 MM HG (ref 40–50)
PH BLDV: 7.38 [PH] (ref 7.32–7.43)
PLATELET # BLD AUTO: 106 10E3/UL (ref 150–450)
PO2 BLDV: 32 MM HG (ref 25–47)
POTASSIUM SERPL-SCNC: 5.4 MMOL/L (ref 3.4–5.3)
POTASSIUM SERPL-SCNC: 5.8 MMOL/L (ref 3.4–5.3)
POTASSIUM SERPL-SCNC: 5.9 MMOL/L (ref 3.4–5.3)
POTASSIUM SERPL-SCNC: 6.3 MMOL/L (ref 3.4–5.3)
POTASSIUM SERPL-SCNC: 6.6 MMOL/L (ref 3.4–5.3)
PR INTERVAL - MUSE: 188 MS
PR INTERVAL - MUSE: 206 MS
PROT SERPL-MCNC: 6.4 G/DL (ref 6.4–8.3)
QRS DURATION - MUSE: 94 MS
QRS DURATION - MUSE: 98 MS
QT - MUSE: 366 MS
QT - MUSE: 370 MS
QTC - MUSE: 430 MS
QTC - MUSE: 447 MS
R AXIS - MUSE: 51 DEGREES
R AXIS - MUSE: 53 DEGREES
RBC # BLD AUTO: 1.95 10E6/UL (ref 4.4–5.9)
SODIUM SERPL-SCNC: 124 MMOL/L (ref 136–145)
SPECIMEN EXPIRATION DATE: NORMAL
SYSTOLIC BLOOD PRESSURE - MUSE: NORMAL MMHG
SYSTOLIC BLOOD PRESSURE - MUSE: NORMAL MMHG
T AXIS - MUSE: 30 DEGREES
T AXIS - MUSE: 30 DEGREES
UNIT ABO/RH: NORMAL
UNIT NUMBER: NORMAL
UNIT STATUS: NORMAL
UNIT TYPE ISBT: 600
VENTRICULAR RATE- MUSE: 83 BPM
VENTRICULAR RATE- MUSE: 88 BPM
WBC # BLD AUTO: 9.6 10E3/UL (ref 4–11)

## 2023-02-20 PROCEDURE — 258N000003 HC RX IP 258 OP 636

## 2023-02-20 PROCEDURE — 999N000248 HC STATISTIC IV INSERT WITH US BY RN

## 2023-02-20 PROCEDURE — 250N000011 HC RX IP 250 OP 636

## 2023-02-20 PROCEDURE — 120N000003 HC R&B IMCU UMMC

## 2023-02-20 PROCEDURE — 250N000013 HC RX MED GY IP 250 OP 250 PS 637: Performed by: STUDENT IN AN ORGANIZED HEALTH CARE EDUCATION/TRAINING PROGRAM

## 2023-02-20 PROCEDURE — 82803 BLOOD GASES ANY COMBINATION: CPT

## 2023-02-20 PROCEDURE — 85610 PROTHROMBIN TIME: CPT

## 2023-02-20 PROCEDURE — 85027 COMPLETE CBC AUTOMATED: CPT

## 2023-02-20 PROCEDURE — 99222 1ST HOSP IP/OBS MODERATE 55: CPT | Mod: 25 | Performed by: INTERNAL MEDICINE

## 2023-02-20 PROCEDURE — 93308 TTE F-UP OR LMTD: CPT | Mod: 26 | Performed by: INTERNAL MEDICINE

## 2023-02-20 PROCEDURE — 36415 COLL VENOUS BLD VENIPUNCTURE: CPT

## 2023-02-20 PROCEDURE — 86923 COMPATIBILITY TEST ELECTRIC: CPT

## 2023-02-20 PROCEDURE — 258N000001 HC RX 258

## 2023-02-20 PROCEDURE — 99233 SBSQ HOSP IP/OBS HIGH 50: CPT | Mod: GC | Performed by: ORTHOPAEDIC SURGERY

## 2023-02-20 PROCEDURE — 99222 1ST HOSP IP/OBS MODERATE 55: CPT | Performed by: INTERNAL MEDICINE

## 2023-02-20 PROCEDURE — 255N000002 HC RX 255 OP 636: Performed by: INTERNAL MEDICINE

## 2023-02-20 PROCEDURE — 93321 DOPPLER ECHO F-UP/LMTD STD: CPT | Mod: 26 | Performed by: INTERNAL MEDICINE

## 2023-02-20 PROCEDURE — 80053 COMPREHEN METABOLIC PANEL: CPT

## 2023-02-20 PROCEDURE — 250N000013 HC RX MED GY IP 250 OP 250 PS 637

## 2023-02-20 PROCEDURE — 84132 ASSAY OF SERUM POTASSIUM: CPT

## 2023-02-20 PROCEDURE — 86850 RBC ANTIBODY SCREEN: CPT | Performed by: STUDENT IN AN ORGANIZED HEALTH CARE EDUCATION/TRAINING PROGRAM

## 2023-02-20 PROCEDURE — P9016 RBC LEUKOCYTES REDUCED: HCPCS

## 2023-02-20 PROCEDURE — 86901 BLOOD TYPING SEROLOGIC RH(D): CPT | Performed by: STUDENT IN AN ORGANIZED HEALTH CARE EDUCATION/TRAINING PROGRAM

## 2023-02-20 PROCEDURE — 93325 DOPPLER ECHO COLOR FLOW MAPG: CPT | Mod: 26 | Performed by: INTERNAL MEDICINE

## 2023-02-20 PROCEDURE — C8924 2D TTE W OR W/O FOL W/CON,FU: HCPCS

## 2023-02-20 PROCEDURE — 82728 ASSAY OF FERRITIN: CPT | Performed by: STUDENT IN AN ORGANIZED HEALTH CARE EDUCATION/TRAINING PROGRAM

## 2023-02-20 PROCEDURE — P9047 ALBUMIN (HUMAN), 25%, 50ML: HCPCS

## 2023-02-20 PROCEDURE — 250N000011 HC RX IP 250 OP 636: Performed by: STUDENT IN AN ORGANIZED HEALTH CARE EDUCATION/TRAINING PROGRAM

## 2023-02-20 RX ORDER — DEXTROSE MONOHYDRATE 25 G/50ML
25 INJECTION, SOLUTION INTRAVENOUS ONCE
Status: COMPLETED | OUTPATIENT
Start: 2023-02-20 | End: 2023-02-20

## 2023-02-20 RX ORDER — LACTULOSE 10 G/15ML
100 SOLUTION ORAL
Status: DISCONTINUED | OUTPATIENT
Start: 2023-02-20 | End: 2023-02-20

## 2023-02-20 RX ORDER — LACTULOSE 10 G/15ML
20 SOLUTION ORAL
Status: DISCONTINUED | OUTPATIENT
Start: 2023-02-20 | End: 2023-02-20

## 2023-02-20 RX ORDER — NICOTINE POLACRILEX 4 MG
15-30 LOZENGE BUCCAL
Status: DISCONTINUED | OUTPATIENT
Start: 2023-02-20 | End: 2023-02-28

## 2023-02-20 RX ORDER — BUMETANIDE 0.25 MG/ML
1 INJECTION INTRAMUSCULAR; INTRAVENOUS ONCE
Status: COMPLETED | OUTPATIENT
Start: 2023-02-20 | End: 2023-02-20

## 2023-02-20 RX ORDER — DEXTROSE MONOHYDRATE 25 G/50ML
25-50 INJECTION, SOLUTION INTRAVENOUS
Status: DISCONTINUED | OUTPATIENT
Start: 2023-02-20 | End: 2023-02-20

## 2023-02-20 RX ORDER — ALBUMIN (HUMAN) 12.5 G/50ML
50 SOLUTION INTRAVENOUS ONCE
Status: CANCELLED | OUTPATIENT
Start: 2023-02-20 | End: 2023-02-20

## 2023-02-20 RX ORDER — NICOTINE POLACRILEX 4 MG
15-30 LOZENGE BUCCAL
Status: DISCONTINUED | OUTPATIENT
Start: 2023-02-20 | End: 2023-02-20

## 2023-02-20 RX ORDER — DEXTROSE MONOHYDRATE 25 G/50ML
25-50 INJECTION, SOLUTION INTRAVENOUS
Status: DISCONTINUED | OUTPATIENT
Start: 2023-02-20 | End: 2023-02-28

## 2023-02-20 RX ADMIN — PANTOPRAZOLE SODIUM 40 MG: 40 TABLET, DELAYED RELEASE ORAL at 08:49

## 2023-02-20 RX ADMIN — FLUOXETINE 60 MG: 20 CAPSULE ORAL at 21:36

## 2023-02-20 RX ADMIN — LACTULOSE 20 G: 10 POWDER, FOR SOLUTION ORAL at 21:06

## 2023-02-20 RX ADMIN — ONDANSETRON 4 MG: 2 INJECTION INTRAMUSCULAR; INTRAVENOUS at 15:26

## 2023-02-20 RX ADMIN — PANTOPRAZOLE SODIUM 40 MG: 40 TABLET, DELAYED RELEASE ORAL at 20:27

## 2023-02-20 RX ADMIN — FOLIC ACID 1 MG: 1 TABLET ORAL at 08:50

## 2023-02-20 RX ADMIN — OXYCODONE HYDROCHLORIDE 5 MG: 5 TABLET ORAL at 08:24

## 2023-02-20 RX ADMIN — DEXTROSE MONOHYDRATE 25 G: 25 INJECTION, SOLUTION INTRAVENOUS at 09:05

## 2023-02-20 RX ADMIN — ALBUMIN HUMAN 75 G: 0.25 SOLUTION INTRAVENOUS at 08:25

## 2023-02-20 RX ADMIN — RIFAXIMIN 550 MG: 550 TABLET ORAL at 08:50

## 2023-02-20 RX ADMIN — RIFAXIMIN 550 MG: 550 TABLET ORAL at 20:27

## 2023-02-20 RX ADMIN — INSULIN GLARGINE 28 UNITS: 100 INJECTION, SOLUTION SUBCUTANEOUS at 20:35

## 2023-02-20 RX ADMIN — DEXTROSE MONOHYDRATE 25 G: 25 INJECTION, SOLUTION INTRAVENOUS at 02:28

## 2023-02-20 RX ADMIN — SODIUM CHLORIDE 500 ML: 9 INJECTION, SOLUTION INTRAVENOUS at 09:07

## 2023-02-20 RX ADMIN — INSULIN GLARGINE 28 UNITS: 100 INJECTION, SOLUTION SUBCUTANEOUS at 08:51

## 2023-02-20 RX ADMIN — SODIUM ZIRCONIUM CYCLOSILICATE 10 G: 10 POWDER, FOR SUSPENSION ORAL at 20:28

## 2023-02-20 RX ADMIN — DEXTROSE MONOHYDRATE 300 ML: 100 INJECTION, SOLUTION INTRAVENOUS at 02:36

## 2023-02-20 RX ADMIN — LACTULOSE 20 G: 10 POWDER, FOR SOLUTION ORAL at 15:46

## 2023-02-20 RX ADMIN — DEXTROSE MONOHYDRATE 300 ML: 100 INJECTION, SOLUTION INTRAVENOUS at 09:06

## 2023-02-20 RX ADMIN — HUMAN ALBUMIN MICROSPHERES AND PERFLUTREN 5 ML: 10; .22 INJECTION, SOLUTION INTRAVENOUS at 15:16

## 2023-02-20 RX ADMIN — LACTULOSE 20 G: 10 POWDER, FOR SOLUTION ORAL at 08:49

## 2023-02-20 RX ADMIN — THIAMINE HCL TAB 100 MG 100 MG: 100 TAB at 08:50

## 2023-02-20 RX ADMIN — SODIUM CHLORIDE 500 ML: 9 INJECTION, SOLUTION INTRAVENOUS at 02:36

## 2023-02-20 RX ADMIN — BUMETANIDE 1 MG: 0.25 INJECTION INTRAMUSCULAR; INTRAVENOUS at 02:24

## 2023-02-20 RX ADMIN — HUMAN INSULIN 7.89 UNITS: 100 INJECTION, SOLUTION SUBCUTANEOUS at 09:05

## 2023-02-20 RX ADMIN — SODIUM ZIRCONIUM CYCLOSILICATE 10 G: 10 POWDER, FOR SUSPENSION ORAL at 08:51

## 2023-02-20 RX ADMIN — HUMAN INSULIN 7.69 UNITS: 100 INJECTION, SOLUTION SUBCUTANEOUS at 02:29

## 2023-02-20 RX ADMIN — SODIUM ZIRCONIUM CYCLOSILICATE 10 G: 10 POWDER, FOR SUSPENSION ORAL at 14:07

## 2023-02-20 RX ADMIN — ONDANSETRON 4 MG: 2 INJECTION INTRAMUSCULAR; INTRAVENOUS at 21:30

## 2023-02-20 RX ADMIN — THERA TABS 1 TABLET: TAB at 08:50

## 2023-02-20 ASSESSMENT — ACTIVITIES OF DAILY LIVING (ADL)
ADLS_ACUITY_SCORE: 33

## 2023-02-20 NOTE — PROVIDER NOTIFICATION
Provider Li Dee MD was paged for a change in patient condition. Patient appears more drowsy, sleeping in the midst of conversation and required more than a gentle touch to wake him up.   Provider set in orders for nurse managed [facility] protocol and to administer an extra dose of lactulose.

## 2023-02-20 NOTE — PROGRESS NOTES
[facility]    Progress Note - Medicine Service, MAROON TEAM 2       Date of Admission:  2/11/2023    Assessment & Plan   Dillon Salter is a 27 year old male with history of Asperger's, alcoholic cirrhosis c/b esophageal varices and hepatic encephalopathy currently undergoing liver transplant evaluation at [facility], DM II who was directly admitted from [facility] for hematemesis, decompensated cirrhosis and hepatic encephalopathy. Hemodynamically stable with resolved encephalopathy. Course complicated by ASHISH.     Changes Today:  - expedited liver transplant eval per GI    > dobutamine stress test with cardiology    > repeat peth level  - transfused for Hgb 6.6 today  - ASHISH- holding bumex and coreg  - Albumin challenge x3 days (ends tomorrow)  - 500cc NS bolus  - follow up renal recs  - recheck temp - remains afebrile  - Trend K (shifted x3 overnight and x1 this morning)   - worsening lethargy, lactulose per [facility] protocol     ASHISH non-oliguric   Baseline Cr <1, acutely worsened 2/18 to 1.37 from 0.8, and rising, possibly related to restarting bumex. With worsening, will trial albumin 1g/kg x3 days and continue to hold diuresis. Cr improved slightly to 1.68 on 2/20.   - Strict I/O  - Hold bumex, coreg (per GI, though less likely due to BP)  - Trend CMP  - Avoid nephrotoxic agents   - s/p 500cc NS bolus 2/20  - nephrology consult, appreciate recs    Hyperkalemia, recurrent  Moderate 6.1 2/19AM, in setting of ASHISH. Has been as high as 6.3 this admission previously thought to be related to potential absorption of blood from gastropathy, and/or insulin deficient state, now most likely elevated in the setting of ASHISH. Has received lokelma and kayexalate on several occassions, shifted with insulin multiple times. No associated EKG changes on multiple checks.   - Trend K   - s/p Lokelma 10g TID 2/19   - nephrology consult, as above    Acute on chronic blood loss  anemia- stable  Hematemesis- resolved  Hx of esophageal varices   Portal hypertensive gastropathy  Esophageal ulceration  Recurrent rectal varices  2-3 episodes hematemesis prior to ED of ~400 mL, ~325 mL at ER, 250 mL anu hematemesis since arrival. HDS. Given 1 unit pRBCs on 2/12 for Hgb of 6.9. INR 1.48. Esophageal varices recently banded 1/23/23. 2 20 gauge IVs in place. EGD 2/12 AM: diffuse oozing likely 2/2 portal hypertensive gastropathy, no bleeding seen from recent banding, nonbleeding esophageal ulcers seen. No continued hematemesis. Transfused for Hgb 6.8 on 2/15 and for Hgb 6.6 on 2/20.  - trend hgb daily and transfuse for <7  - pantoprazole 40mg BID  - finished course of ceftriaxone 1g qday for total of 7d  - octreotide discontinued 2/13, restarted evening of 2/13 for presumed hematemesis, discontinued 2/14  - coreg 6.25mg BID, holding now for ASHISH  - GI following    > follow up with GI for a repeat EGD with possible RFA in 3 months    > will need CMP and CBC 1 week after discharge (GI to set up)    Hepatic encephalopathy, improving  Decompensated alcohol related cirrhosis c/b esophageal varices   Portal HTN and splenomegaly  Coagulopathy due to liver disease  Thrombocytopenia due to liver disease, present on admission  Follows with Dr. Wood. MELD-Na 24 today  Etiology: Alcohol, diagnosed 4/2022  HE: lactulose BID, continue rifaximin  EV/GV: esophageal varices s/p banding 1/23/23, stable on EGD 2/12 without evidence of bleeding; on carvedilol 6.25mg BID, held 2/18 with ASHISH  Coagulopathy: INR 1.48,  (suspect 171 on admission was hemoconcentration)  Ascites: None per RUQ 2/12, held PTA bumex 1 mg and spironolactone given ASHISH -> bumex restarted 2/16, held 2/18 with ASHISH  SBP:  no h/o   HCC: last CT 1/2023, no evidence of  Liver transplant candidacy: Last drink 4/2022, follows with Dr. Wood. Ongoing liver transplant eval outpatient including Chemical dependency assessment and programming/Mental health  referral, ongoing. CT angiogram, scheduled 4/2023. PFTs given history of tobacco use  - GI consulted, appreciate recs  - daily MELD labs  - NG placement for lactulose administration per [facility] protocol 2/12, NG tube removed 2/13 as patient's mental status improved and able to take meds PO  - <2g Na, <2L water, >1.5 g/kg/day protein diet  - Bcx NGTD    MELD-Na score: 30 at 2/20/2023  4:08 AM  MELD score: 24 at 2/20/2023  4:08 AM  Calculated from:  Serum Creatinine: 1.68 mg/dL at 2/20/2023  4:08 AM  Serum Sodium: 124 mmol/L (Using min of 125 mmol/L) at 2/20/2023  4:08 AM  Total Bilirubin: 6.3 mg/dL at 2/20/2023  4:08 AM  INR(ratio): 1.68 at 2/20/2023  4:08 AM  Age: 27 years    Hyperglycemia  DM II - hgb A1c 5.6%  - glargine increased to 28 units BID on 2/18 (home dose is 20 units BID, have been titrating here)  - high dose sliding scale insulin  - hypoglycemia protocol    Leukocytosis  Suspect reactive in setting of UGIB. No focal exam or history otherwise. HDS, has been afebrile.  - received ceftriaxone for UGIB for SBP ppx as above  - trend   - Bcx NGTD    Hypervolemic hyponatremia  Na 133 on admission, has been slowly trending down. Edema in feet and ankles noted on 2/16, slightly improved with elevation of legs.   - restarted bumex, but holding now given ASHISH  - holding spironolactone given hyperkalemia  - salt restriction    Lactic acidosis, resolved  Lactate 2.8 on admission. Likely 2/2 hypovolemia in the setting of acute GIB. Given 500 ml w/ K shifting on 2/12, then 1u pRBC  - trend, give additional fluids/blood as needed     CHRONIC & STABLE PROBLEMS  MDD/anxiety   Autism spectrum   Mother is his caretaker, lives with parents.   - continue PTA fluoxetine  - previously his mother, Leticia, has been able to stay with patient overnight which helps with mood/anxiety. This was discussed with charge RN    Chronic pain, musculoskeletal  - Continue PTA oxycodone 5mg BID PRN  - Gabapentin not a good option -  previously caused excessive drowsiness  - Continue Tylenol, max 2 Gm daily  - Will discuss other agents    Malnutrition:   Level of malnutrition: Severe   - nutrition plan (see note from 2/14)  --> Discontinue clear liquid supplements  --> Add Ensure Plus (vanilla) at 2 PM and Special K protein bar once stephen, also at 2 PM per request       Diet: Snacks/Supplements Adult: Other; See comments below; With Meals  Combination Diet Regular Diet; 2 gm K Diet; High Consistent Carb (75 g CHO per Meal) Diet  Diet    DVT Prophylaxis: Pneumatic compression device  Negron Catheter: Not present  Fluids: None  Lines: None     Cardiac Monitoring: ACTIVE order. Indication: Electrolyte Imbalance (24 hours)- Magnesium <1.3 mg/ml; Potassium < =2.8 or > 5.5 mg/ml  Code Status: Full Code         Disposition Plan      The patient's care was discussed with the Attending Physician, Dr. López.    Narcisa Willard, MS3  Medical Student  [facility] Service    Resident/Fellow Attestation   I, Radha Granado MD, was present with the medical/CHRISTA student who participated in the service and in the documentation of the note.  I have verified the history and personally performed the physical exam and medical decision making.  I agree with the assessment and plan of care as documented in the note.      Radha Granado MD  PGY2  Date of Service (when I saw the patient): 02/20/23   ___________________________    Interval History   Nursing notes reviewed. No acute events overnight. Patient feels very tired this morning, appears flushed and less alert compared to previous days. Has had 2 BMs since 4AM, has been voiding as well. His mother notes a new dry cough that started last night. She also notes he seemed a little confused yesterday, but less so this morning. His appetite is still good. No chest pain, palpitations, shortness of breath, abdominal pain, no hematochezia/melena.      Physical Exam   Vital Signs: Temp: 98.2  F (36.8  C) Temp src: Oral BP:  123/59 Pulse: 91   Resp: 18 SpO2: 94 % O2 Device: None (Room air)    Weight: 173 lbs 15.09 oz    General Appearance: Awake, alert, NAD, appears tired and flushed  Respiratory: CTAB, no wheezing, no increased WOB  Cardiovascular: RRR, III/VI systolic murmur, extremities warm and well perfused  GI: Soft, nontender, no fluid wave  Skin: No rash, some bruising on the lower extremities, 1+ edema in feet/ankles  Neuro: Oriented x3, no focal deficits. Tremor in bilateral upper extremities.    Medical Decision Making      Please see A&P for additional details of medical decision making.      Data   All labs and imaging reviewed

## 2023-02-20 NOTE — PROGRESS NOTES
Dr. Dee paged asking for any other K+ rechecks tonight?  Orders placed for K+ and VBG to be drawn now.

## 2023-02-20 NOTE — PROGRESS NOTES
GASTROENTEROLOGY PROGRESS NOTE    Date: 02/20/2023     ASSESSMENT: 27 year old male with a medical history of Asperger's, T2DM, alcohol related cirrhosis c/b hepatic encephalopathy, history of esophageal varices bleeding (5/2022, 1/2023) and rectal varices s/p sclerotherapy 1/2023, ascites presenting with multiple episodes of hematemesis. Underwent EGD 2/11 showed oozing portal hypertensive gastropathy, and healed esophageal ulcer.    Decompensated cirrhosis with hepatic encephalopathy, h/o esophageal variceal bleed, HE, MELD-Na 30  Rectal varices, s/p sclerotherapy 1/2023  Protein calories malnutrition  Follows with Dr. Wood. Has PEth positive 1/26/23 in the setting of post transfusion (which could cause false positive level). Patient reports last drink 4/2022 and recently started on alc rehab program in Jan 2023. Given ongoing prolonged hospitalization and has been sober from alcohol >6 months, will restart liver transplant evaluation this admission.    Portal hypertensive gastropathy, bleeding, improved  EGD showed oozing portal hypertensive gastropathy. Starting on-selective b-blocker for PHG and also for  history of esophageal varices bleeding. No further bleeding and stable Hgb.     ASHISH, worsening. Initially likely from acute blood loss then now with diuretics use.     RECOMMENDATIONS:  - Holding carvedilol, though less likely to be the cause of ASHISH given no hypotension episode while on it.  - Hold diuretics  - lactulose titrate to 3 BMs/day, and rifaximin  - 2 g low sodium diet; ensure high protein intake. PT/OT  - Repeat PEth add on for today labs  - Please obtain renal consult given ASHISH and hyperkalemia  - Please obtain cardiology consult for dobutamine stress test as a part of cardiac eval for liver transplant    GI will schedule EGD with possible RFA in 3 months for further treatment of portal hypertensive gastropathy    Gastroenterology follow up recommendations: Dr. Wood scheduled for 4/18/23       Lillie Flynn MD  Transplant Hepatology  _______________________________________________________________    Subjective: No overt blood loss  Creatinine slightly up on bumex  Reports leg swelling still there but better    Objective:  Blood pressure 125/71, pulse 89, temperature 98.2  F (36.8  C), temperature source Oral, resp. rate 14, weight 78.9 kg (173 lb 15.1 oz), SpO2 98 %.  Limited physical exam, patient was sleeping.  Gen:NAD  HEENT: jaundice  CV: RRR  Lungs: breathing comfortably on room air  Abd: not distended  Skin: no jaundice  Neuro: non focal    LABS:  BMP  Recent Labs   Lab 02/20/23  0730 02/20/23  0638 02/20/23  0528 02/20/23  0415 02/20/23  0408 02/20/23  0222 02/20/23  0141 02/19/23  2338 02/19/23  2333 02/19/23  2146 02/19/23  2029 02/19/23  0608 02/19/23  0553 02/18/23  1822 02/18/23  1720 02/18/23  0604 02/18/23  0548   NA  --   --   --   --  124*  --   --   --   --   --  124*  --  125*  --  126*  --  128*   POTASSIUM 5.9*  --   --   --  5.8*  5.8*  --  6.6*  --  6.3*  --  6.3*   < > 6.1*   < > 5.9*   < > 5.8*   CHLORIDE  --   --   --   --  87*  --   --   --   --   --   --   --  89*  --  89*  --  91*   SARTHAK  --   --   --   --  9.1  --   --   --   --   --   --   --  9.3  --  9.1  --  8.7   CO2  --   --   --   --  25  --   --   --   --   --   --   --  27  --  26  --  28   BUN  --   --   --   --  64.3*  --   --   --   --   --   --   --  55.7*  --  50.9*  --  46.0*   CR  --   --   --   --  1.68*  --   --   --   --   --  1.72*  --  1.50*  --  1.45*  --  1.32*   GLC  --  168* 184* 184* 164*   < >  --    < >  --    < >  --    < > 146*   < > 237*   < > 207*    < > = values in this interval not displayed.     CBC  Recent Labs   Lab 02/20/23  0408 02/19/23  0553 02/18/23  0548 02/17/23  0438   WBC 9.6 11.7* 12.2* 11.4*   RBC 1.95* 2.10* 2.17* 2.14*   HGB 6.6* 7.1* 7.9* 7.2*   HCT 20.3* 21.7* 22.1* 21.6*   * 103* 102* 101*   MCH 33.8* 33.8* 36.4* 33.6*   MCHC 32.5 32.7 35.7 33.3   RDW 20.1* 20.0*  19.7* 19.9*   * 127* 126* 115*     INR  Recent Labs   Lab 02/20/23  0408 02/19/23  0553 02/18/23  0548 02/17/23  0438   INR 1.68* 1.58* 1.66* 1.84*     LFTs  Recent Labs   Lab 02/20/23  0408 02/19/23  0553 02/18/23  0548 02/17/23  0438   ALKPHOS 133* 161* 153* 161*   AST 67* 75* 67* 60*   ALT 42 54* 57* 61*   BILITOTAL 6.3* 5.1* 4.7* 5.4*   PROTTOTAL 6.4 6.3* 5.8* 5.7*   ALBUMIN 4.2 3.7 3.1* 3.1*      PANC  No lab results found in last 7 days.MELD-Na score: 30 at 2/20/2023  4:08 AM  MELD score: 24 at 2/20/2023  4:08 AM  Calculated from:  Serum Creatinine: 1.68 mg/dL at 2/20/2023  4:08 AM  Serum Sodium: 124 mmol/L (Using min of 125 mmol/L) at 2/20/2023  4:08 AM  Total Bilirubin: 6.3 mg/dL at 2/20/2023  4:08 AM  INR(ratio): 1.68 at 2/20/2023  4:08 AM  Age: 27 years    IMAGING:  Reviewed.

## 2023-02-20 NOTE — PLAN OF CARE
Neuro: A&O x 4. Afebrile. Flat affect. Calls appropriately.   Cardiac: Sinus rhythm, denies dizziness, chest pain, or palpitations. Mild to moderate edema noticeable to the face and lower extremities.  Respiratory: Sating well on RA. LS clear. Reports to have a cough.  GI/: No BM this shift, and patient has a fair urine output.  Endocrine: AC/ HS blood sugars with sliding scale insulin and lantus. Patient was potassium shifted x 1 this shift  Diet/Appetite: Patient is on a Regular diet of low potassium. Patient was educated on limiting or avoiding apple juice due to having high potassium levels. Substitutes were discussed and patient was agreeable. Family (mother and aunt were present).  Skin: Patient has scattered bruises around the arms, and abdomen  LDA: Patient has x 2 PIVs on the left and the right arm saline locked currently.   Activity: Patient is up ad melissa. Patient has upper extremity tremors and hence a stand by assist is encouraged and requires a tray set up for meals.     Plan: Continue to monitor and alert team with any changes or concerns.

## 2023-02-20 NOTE — CONSULTS
Cardiology Inpatient Consultation  February 20, 2023    Reason for Consult:  A cardiology consult was requested by Dr. Granado from the Hospitalist service to provide clinical guidance regarding an expedited liver transplant cardiac evaluation.    Assessment:  Dillon Salter is a 27 year old male with history of alcoholic cirrhosis c/b esophageal varices and hepatic encephalopathy, T2DM, and Asperger's Syndrome who was directly admitted from [facility] for hematemesis, decompensated cirrhosis, and hepatic encephalopathy. Over the course of this admission, his encephalopathy has resolved, but his clinical course has been complicated by a new ASHISH. He is currently undergoing liver transplant evaluation at [facility], and cardiology has been consulted to provide an expedited pre-op evaluation. He has already been scheduled for a CT Angiogram on 4/18/2023. Examination of this patient revealed no cardiac history and no limitation of activities outside of the hospital, METs score >7. He is considered safe for transplant surgery without further cardiac workup.    1. Alcoholic cirrhosis  2. T2DM    #Pre-op Risk Stratification  RCRI: 1 (Elevated risk surgery)  The above RCRI score is correlated with 6% risk of major cardiac event in perioperative period. Please note RCRI score does not incorporate risk of prolonged hospitalization or complications from pulmonary or other causes.     Patient leads an active life and can achieve >7 function METs. The pre-test probability of obstructive CAD in a patient this age with the current functional status is low. We do not recommend further non-invasive or invasive cardiac testing for risk stratification. Per prior documentation, patient was scheduled for a CT Angiogram oupatient. Would not recommend this study. Moreover, this would likely not change course of his management.     Plan of care discussed with Dr. Sanderson, who agrees with above plan.    Cardiology will sign off at  this time. Please reach out if you have further questions.     Shun Richards, MS4  Cardiology Service    I, Jose Hadley MD, was present with the medical/CHRISTA student who participated in the service and in the documentation of the note.  I have verified the history and personally performed the physical exam and medical decision making.  I agree with the assessment and plan of care as documented in the note.         HPI:   Dillon Salter is a 27 year old male with history of alcoholic cirrhosis c/b esophageal varices and hepatic encephalopathy, T2DM, and Asperger's Syndrome who was directly admitted from [facility] for hematemesis, decompensated cirrhosis, and hepatic encephalopathy. At the time of the interview, the patient was very tired and fell asleep intermittently. The history was gathered from the patient and from his mother.     There is no history of heart disease or sudden cardiac death in the family. The patient denies ever experiencing chest pain, dyspnea at rest or with exertion, palpitations, lightheadedness, or syncope. He is able to complete daily tasks and activities, climb stairs without difficulty, and play sports like football and baseball without needing to stop due to dyspnea.    Review of Systems:    Complete review of systems was performed and negative except per HPI.    PMH:  Past Medical History:   Diagnosis Date     Acute on chronic anemia 04/08/2022     Alcohol use disorder      Alcohol use disorder, severe, dependence (H) 7/11/2022     Alcoholic cirrhosis of liver with ascites (H)      Alcoholic hepatitis      Autism spectrum      Autism spectrum disorder 4/8/2022    High functioning autistic.  26-year-old history of autism/Asperger's on the spectrum graduated high school at [employer] worked at [employer] and then another  place until the Covid epidemic in 2020 lives with parents (Leticia and Wes) socially isolated drinks alcohol beer daily      Benign essential hypertension       Bleeding esophageal varices (H) 6/18/2022     Hepatic encephalopathy      Hyponatremia 7/28/2022     Major depressive disorder      Type II Diabetes      Active Problems:  Patient Active Problem List    Diagnosis Date Noted     Acute kidney failure, unspecified (H) 02/20/2023     Priority: Medium     Hematemesis 02/11/2023     Priority: Medium     Thrombocytopenia (H) 01/21/2023     Priority: Medium     Hyperglycemia 07/28/2022     Priority: Medium     Hepatic encephalopathy 06/18/2022     Priority: Medium     Anemia in other chronic diseases classified elsewhere 06/18/2022     Priority: Medium     Acute posthemorrhagic anemia 06/17/2022     Priority: Medium     Anxiety and depression 05/09/2022     Priority: Medium     Balanitis 05/09/2022     Priority: Medium     Autism spectrum disorder 04/08/2022     Priority: Medium     High functioning autistic.   26-year-old history of autism/Asperger's on the spectrum graduated high school at [employer] worked at [employer] and then another  place until the Covid epidemic in 2020 lives with parents (Leticia and Wes) socially isolated drinks alcohol beer daily         Benign essential hypertension 04/08/2022     Priority: Medium     Alcoholic cirrhosis of liver with ascites (H) 04/06/2022     Priority: Medium     Diabetes mellitus type 2 in obese (H) 02/07/2012     Priority: Medium     Social History:  Social History     Tobacco Use     Smoking status: Former     Smokeless tobacco: Never     Tobacco comments:     A pack lasted a year, hardly smoked   Vaping Use     Vaping Use: Former   Substance Use Topics     Alcohol use: Not Currently     Comment: No ETOH since 4/6/22     Drug use: Not Currently     Types: Marijuana     Family History:  Family History   Problem Relation Age of Onset     Hypertension Mother      Substance Abuse Mother      Thyroid Disease Mother      Heart Failure Father      Substance Abuse Father      Chronic Obstructive Pulmonary Disease Father       Substance Abuse Maternal Grandfather        Medications:    albumin human  75 g Intravenous Daily     [Held by provider] bumetanide  1 mg Oral Daily     [Held by provider] carvedilol  6.25 mg Oral BID     FLUoxetine  60 mg Oral At Bedtime     folic acid  1 mg Oral Daily     insulin aspart   Subcutaneous Daily with breakfast     insulin aspart   Subcutaneous Daily with lunch     insulin aspart   Subcutaneous Daily with supper     insulin aspart  1-10 Units Subcutaneous TID AC     insulin aspart  1-7 Units Subcutaneous At Bedtime     insulin glargine  28 Units Subcutaneous BID     lactulose  20 g Oral BID     multivitamin, therapeutic  1 tablet Per Feeding Tube Daily     pantoprazole  40 mg Oral BID     rifaximin  550 mg Oral BID     sodium chloride (PF)  3 mL Intracatheter Q8H     sodium zirconium cyclosilicate  10 g Oral TID     [Held by provider] spironolactone  100 mg Oral At Bedtime     thiamine  100 mg Oral Daily         - MEDICATION INSTRUCTIONS -       dextrose         Physical Exam:  Temp:  [97.5  F (36.4  C)-99  F (37.2  C)] 98.2  F (36.8  C)  Pulse:  [84-96] 91  Resp:  [14-18] 18  BP: (110-136)/(59-73) 123/59  SpO2:  [92 %-99 %] 94 %    Intake/Output Summary (Last 24 hours) at 2/20/2023 1317  Last data filed at 2/20/2023 1000  Gross per 24 hour   Intake 1085 ml   Output 425 ml   Net 660 ml     GEN: no acute distress, fell asleep intermittently during interview.  SKIN: mild jaundice.  CV: RRR, normal s1/s2, no murmurs/rubs/s3/s4.   CHEST: clear to ausculation bilaterally, no rales or wheezing  EXTR: pulses 3+ in upper extremities, 2+ in lower extremities. 2+ pitting edema in the lower extremities bilaterally.   NEURO: alert oriented, speech fluent/appropriate, motor grossly nonfocal  PSYCH: cooperative, affect appropriate, pleasant      Diagnostics:  All labs and imaging were reviewed, of note:    CMP  Recent Labs   Lab 02/20/23  1252 02/20/23  1149 02/20/23  1107 02/20/23  1018 02/20/23  0818 02/20/23  0730  02/20/23  0415 02/20/23  0408 02/20/23  0222 02/20/23  0141 02/19/23  2338 02/19/23  2333 02/19/23  2146 02/19/23 2029 02/19/23  0608 02/19/23  0553 02/18/23  1822 02/18/23  1720 02/18/23  0604 02/18/23  0548 02/17/23  0745 02/17/23  0438   NA  --   --   --   --   --   --   --  124*  --   --   --   --   --  124*  --  125*  --  126*  --  128*  --  128*   POTASSIUM  --   --   --   --   --  5.9*  --  5.8*  5.8*  --  6.6*  --  6.3*  --  6.3*   < > 6.1*   < > 5.9*   < > 5.8*  --  5.0   CHLORIDE  --   --   --   --   --   --   --  87*  --   --   --   --   --   --   --  89*  --  89*  --  91*  --  93*   CO2  --   --   --   --   --   --   --  25  --   --   --   --   --   --   --  27  --  26  --  28  --  29   ANIONGAP  --   --   --   --   --   --   --  12  --   --   --   --   --   --   --  9  --  11  --  9  --  6*   * 145* 144* 148*   < >  --    < > 164*   < >  --    < >  --    < >  --    < > 146*   < > 237*   < > 207*   < > 277*   BUN  --   --   --   --   --   --   --  64.3*  --   --   --   --   --   --   --  55.7*  --  50.9*  --  46.0*  --  35.1*   CR  --   --   --   --   --   --   --  1.68*  --   --   --   --   --  1.72*  --  1.50*  --  1.45*  --  1.32*  --  0.86   GFRESTIMATED  --   --   --   --   --   --   --  56*  --   --   --   --   --  55*  --  64  --  67  --  75  --  >90   SARTHAK  --   --   --   --   --   --   --  9.1  --   --   --   --   --   --   --  9.3  --  9.1  --  8.7  --  8.5*   PROTTOTAL  --   --   --   --   --   --   --  6.4  --   --   --   --   --   --   --  6.3*  --   --   --  5.8*  --  5.7*   ALBUMIN  --   --   --   --   --   --   --  4.2  --   --   --   --   --   --   --  3.7  --   --   --  3.1*  --  3.1*   BILITOTAL  --   --   --   --   --   --   --  6.3*  --   --   --   --   --   --   --  5.1*  --   --   --  4.7*  --  5.4*   ALKPHOS  --   --   --   --   --   --   --  133*  --   --   --   --   --   --   --  161*  --   --   --  153*  --  161*   AST  --   --   --   --   --   --   --  67*  --   --    --   --   --   --   --  75*  --   --   --  67*  --  60*   ALT  --   --   --   --   --   --   --  42  --   --   --   --   --   --   --  54*  --   --   --  57*  --  61*    < > = values in this interval not displayed.     CBC  Recent Labs   Lab 02/20/23  0408 02/19/23  0553 02/18/23  0548 02/17/23  0438   WBC 9.6 11.7* 12.2* 11.4*   RBC 1.95* 2.10* 2.17* 2.14*   HGB 6.6* 7.1* 7.9* 7.2*   HCT 20.3* 21.7* 22.1* 21.6*   * 103* 102* 101*   MCH 33.8* 33.8* 36.4* 33.6*   MCHC 32.5 32.7 35.7 33.3   RDW 20.1* 20.0* 19.7* 19.9*   * 127* 126* 115*     INR  Recent Labs   Lab 02/20/23  0408 02/19/23  0553 02/18/23  0548 02/17/23  0438   INR 1.68* 1.58* 1.66* 1.84*     Arterial Blood Gas  Recent Labs   Lab 02/16/23  1826   O2PER 21       EKG (2/19/2023):    Sinus rhythm, normal EKG.    Transthoracic echocardiogram (1/27/2023):   No pathologic basis for murmur identified.  Global and regional left ventricular function is normal with an EF of 60-65%.  Right ventricular function, chamber size, wall motion, and thickness are normal.  No significant valvular abnormalities were noted.  This study was compared with the study from 7/29/22: There has been no change.    Attending Attestation: I saw, examined and evaluated the patient and reviewed all relevant data with cardiology consult team. I agree with the findings, and our shared assessment and plan of care documented in the edited note and summarized the [place] findings and plan with the patient and his mother.  - Due to age very low risk of perioperative cardiovascular complications from liver transplantation  - Additional testing will not change this assessment      ADDENDUM: normal response to dobutamine stress without evidence for flow obstructive proximal CAD.

## 2023-02-20 NOTE — PLAN OF CARE
Neuro: A&Ox4. Able to make needs known. Increasing lethargy this shift - provider aware.  Cardiac: Tele orders in place. NSR with rare PAC's. Afebrile. VSS.           Respiratory: Sating >95% on RA.  GI/: Adequate urine output. BM x3 this shift.  Diet/appetite: Tolerating high consistent carb/low potassium diet. Eating well. Carb coverage insulin order in place. Strict I/O.  Activity: Up ad melissa in room, SBA in halls.  Pain: At acceptable level on current regimen. Denied pain throughout shift.  Skin: No new deficits noted.  LDA's: L PIV - SL. R PIV - SL.    See provider notification regarding potassium shift x2 this shift. Critical hgb 6.6 this AM.     Plan: Continue with POC. Notify primary team with changes. Continue to encourage oral intake.    Mark Moctezuma RN on 2/20/2023 at 7:00 AM

## 2023-02-20 NOTE — PROVIDER NOTIFICATION
"2110: \"Critical lab value potassium 6.3\"    -New orders to shift patient.    2120: \"Family is requesting a doctor come and assess patient at bedside and to discuss plan for getting potassium values under control. Also concern for increased lethargy\"    -No response from provider.    0030: \"Critical lab value 6.3. Please advise on next steps. Shifting has not worked for this patient the past 3 nights\"    New orders to shift patient.    0259: \"Critical lab result potassium 6.6 (lab drawn at 0141, prior to shifting being completed)\"    No new orders at this time    0434: \"Critical lab value: hgb 6.6\"    No new orders at this time    0534: \"2nd page regarding critical lab value: hgb 6.6\"    0620 (Paged resident) \"3rd page regarding critical lab value hgb 6.6. Have not heard back or seen any new orders. First page sent at 0434, second page sent at 0534.\"    -New order for type & screen.          "

## 2023-02-20 NOTE — PLAN OF CARE
Goal Outcome Evaluation:    Neuro: A&Ox4. Able to make needs known. Lethargic   Cardiac: Tele continued, SR to ST Afebrile. VSS.           Respiratory: Sating >95% on RA.  GI/: Adequate urine output. Continually forgot to save. BM x1 this shift.  Diet/appetite: Tolerating high consistent carb/low potassium diet. Eating well. Carb coverage insulin order in place. Strict I/O.  Activity: Up ad melissa in room, SBA in halls.  Pain: At acceptable level on current regimen. PRN oxy x1.  Skin: No new deficits noted.  LDA's: L PIV - SL     Plan: Continue with POC. Notify primary team with changes. Serum potassium 5-6 throughout the day. Potassium shifting w/ dextrose, insulin and lokelma done twice. Blood sugars dropped to 50s in evening.  made aware.      Plastic Surgery Progress Note    Subjective: Postoperative day #3.  The patient is awake and alert in her hospital room this morning, sitting in a chair.  She reports that she feels much better and that her pain is well controlled.    Objective:      Last Recorded Vitals  BP (!) 152/90   Pulse 95   Temp 98.4 °F (36.9 °C) (Oral)   Resp 14   Ht 5' 7\" (1.702 m)   Wt 50.3 kg (110 lb 14.3 oz)   BMI 17.37 kg/m²   BSA 1.57 m²      I/O's    Intake/Output Summary (Last 24 hours) at 3/14/2021 1112  Last data filed at 3/14/2021 0607  Gross per 24 hour   Intake 840 ml   Output 3300 ml   Net -2460 ml     Physical Exam:   Dressing removed.  Right breast tissue expander in good position.  All incision lines clean, dry, and intact.  No signs of infection or hematoma.  DARRYL drain patent with serosanguineous output.     Labs   Recent Labs   Lab 03/12/21  0606   HGB 11.4*   HCT 33.9*   SODIUM 137   POTASSIUM 4.4   CHLORIDE 109*   CO2 26   BUN 9   CREATININE 0.48*   GLUCOSE 114*   CALCIUM 8.4     Assessment/Plan: Status post immediate right breast reconstruction with placement of tissue expander and biologically derived matrix.    1.  Discharge patient to home today.  2.  Home with drain.  The patient should empty/record output twice daily.  3.  Sleep only on back or left side.  Keep surgical site dry.  Okay to shower from \"bellybutton\" down.  Right arm at shoulder level or below at all times except while eating/drinking/brushing teeth.  4.  No vigorous pushing/pulling/lifting with right arm greater than 5 pounds.  5.  Prescriptions have been e-prescribed.  6.  Follow-up [place] office on Wednesday, March 17, 2021 10:15 AM with Pia Og PA-C.  The appointment time has already been scheduled.        Waldemar Wyatt MD

## 2023-02-20 NOTE — CONSULTS
Nephrology Initial Consult  February 20, 2023      Dillon Salter MRN:[id number] YOB: 1995  Date of Admission:2/11/2023  Primary care provider: Gary Rodrigues  Requesting physician: Lazaro López*    ASSESSMENT AND RECOMMENDATIONS:   # ASHISH  # Hyperkalemia  # Hypochloremia  # Cirrhosis  Dillon Salter is a 27 year old male with history of Asperger's, alcoholic cirrhosis c/b esophageal varices and hepatic encephalopathy currently undergoing liver transplant evaluation at [facility], DM II who was directly admitted from [facility] for hematemesis, decompensated cirrhosis and hepatic encephalopathy.  Nephrology consulted for ASHISH and hyperkalemia.     Patient initially admitted for hematemesis and now with worsening creatinine after Bumex restarted. Creatinine elevated to 1.68 on initial consult evaluation, baseline creatinine 0.7 - 0.8. He is on day 3 of an albumin challenge. On initial exam, pt has moist mucous membranes, no ascites, clear lungs and 2-3+ BLE pitting edema. Other notable labs include chloride 87, bicarbonate 25, BUN 64.3, Hgb 6.6, and K 5.9 (max elevated to 6.3 despite multiple shifting attempts with insulin and scheduled Lokelma).     ASHISH  possibly prerenal hypovolemia as the pt endorsed decreased po intake and Urine is sodium <20. He has not been tachycardia and his orthostatic blood pressures are normal, which is unexpected if he is truly intravascularly dry. ASHISH does not appear consistent with HRS physiology. While he does have worsening BLE edema, would hold off on diuretics or giving volume for now. He did receive 1 dose of Bumex today, so we should monitor how his renal function responds to this diuretic dose. He did report worsening fatigue, unclear if this is due to worsened kidney or liver function. Suspect his hyperkalemia is due to his ASHISH. Given his decreased appetite and Urine Na<20, he may not have enough sodium delivery to the kidney to drive  potassium excretion. Would continue him on his Lokelma for now and monitor K. His decreased chloride in setting of a normal bicarbonate is also interesting finding. Would expect him to be acidotic given his GI loss from lactulose.     Recommendations:  - Order Echo to evaluate IVC and help determine if he is intravascularly dry  - Order VBG given worsened fatigue  - Hold off on fluids and diuresis for now, will re-evaluate creatinine in AM  - Continue Lokelma for now  - Monitor BMP and Potassium    Recommendations were communicated to primary team in person.     Seen and discussed with RIGO Rodarte MD   Internal Medicine PGY2      REASON FOR CONSULT: ASHISH and Hyperkalemia    HISTORY OF PRESENT ILLNESS:  Admitting provider and nursing notes reviewed    Dillon Salter is a 27 year old male with history of Asperger's, alcoholic cirrhosis c/b esophageal varices and hepatic encephalopathy currently undergoing liver transplant evaluation at [facility], DM II who was directly admitted from [facility] for hematemesis, decompensated cirrhosis and hepatic encephalopathy. Hemodynamically stable with resolved encephalopathy. Course complicated by ASHISH.     Per patient, he is in the hospital due to hematemesis.  His mother and aunt are with him at bedside.  Overall, he has been feeling well.  Denies fevers, chills, chest pain, shortness of breath, abdominal pain, N/V.   He is having diarrhea as he is on lactulose.  He is able to eat and drink okay. Patient states he has been urinating well.    PAST MEDICAL HISTORY:    Past Medical History:   Diagnosis Date     Acute on chronic anemia 04/08/2022     Alcohol use disorder      Alcohol use disorder, severe, dependence (H) 7/11/2022     Alcoholic cirrhosis of liver with ascites (H)      Alcoholic hepatitis      Autism spectrum      Autism spectrum disorder 4/8/2022    High functioning autistic.  26-year-old history of autism/Asperger's on the spectrum  graduated high school at [employer] worked at  and then another  place until the Covid epidemic in 2020 lives with parents (Leticia and Wes) socially isolated drinks alcohol beer daily      Benign essential hypertension      Bleeding esophageal varices (H) 6/18/2022     Hepatic encephalopathy      Hyponatremia 7/28/2022     Major depressive disorder      Type II Diabetes        Past Surgical History:   Procedure Laterality Date     COLONOSCOPY N/A 1/27/2023    Procedure: Colonoscopy;  Surgeon: Osman Montoya MD;  Location: [facility] OR     ENDOSCOPIC ULTRASOUND LOWER GASTROINTESTIONAL TRACT (GI) N/A 1/27/2023    Procedure: ULTRASOUND, LOWER GI TRACT, ENDOSCOPIC with glueing;  Surgeon: Osman Montoya MD;  Location: [facility] OR     ESOPHAGOSCOPY, GASTROSCOPY, DUODENOSCOPY (EGD), COMBINED N/A 5/24/2022    Procedure: ESOPHAGOGASTRODUODENOSCOPY (EGD) with esophageal banding;  Surgeon: Gary Hurst MD;  Location: [facility] Main OR     ESOPHAGOSCOPY, GASTROSCOPY, DUODENOSCOPY (EGD), COMBINED N/A 6/20/2022    Procedure: ESOPHAGOGASTRODUODENOSCOPY;  Surgeon: Gavino Reza MD;  [facility] Main [facility]     ESOPHAGOSCOPY, GASTROSCOPY, DUODENOSCOPY (EGD), COMBINED N/A 1/23/2023    Procedure: ESOPHAGOGASTRODUODENOSCOPY (EGD) with esophageal variceal banding;  Surgeon: Juan Boo MD;  [facility]     ESOPHAGOSCOPY, GASTROSCOPY, DUODENOSCOPY (EGD), COMBINED N/A 1/25/2023    Procedure: ESOPHAGOGASTRODUODENOSCOPY (EGD);  Surgeon: Juan Boo MD;  [facility] Main [facility]     ESOPHAGOSCOPY, GASTROSCOPY, DUODENOSCOPY (EGD), COMBINED N/A 1/27/2023    Procedure: Esophagoscopy, gastroscopy, duodenoscopy (EGD), combined;  Surgeon: Osman Montoya MD;  Location: [facility] OR     ESOPHAGOSCOPY, GASTROSCOPY, DUODENOSCOPY (EGD), COMBINED N/A 2/12/2023    Procedure: ESOPHAGOGASTRODUODENOSCOPY (EGD);  Surgeon: Leventhal, Thomas Michael, MD;  Location: [facility] OR     MIDLINE DOUBLE LUMEN PLACEMENT   5/26/2022          PICC TRIPLE LUMEN PLACEMENT  7/28/2022             MEDICATIONS:  PTA Meds  Prior to Admission medications    Medication Sig Last Dose Taking? Auth Provider Long Term End Date   acetaminophen (TYLENOL) 500 MG tablet Take 500 mg by mouth daily as needed for pain Past Month Yes Unknown, Entered By History     carvedilol (COREG) 6.25 MG tablet Take 1 tablet (6.25 mg) by mouth 2 times daily for 30 days  Yes Radha Granado MD Yes 3/19/23   FLUoxetine (PROZAC) 40 MG capsule Take 40 mg by mouth At Bedtime Take in addition to 20 mg capsule for a total of 60 mg at bedtime. 2/10/2023 at HS Yes Unknown, Entered By History No    insulin aspart (NOVOLOG FLEXPEN) 100 UNIT/ML pen 1 unit per 10 grams of carb, plus additional units based on following scale   For Pre-Meal  - 164 give 1 unit. For Pre-Meal  - 189 give 2 units. For Pre-Meal  - 214 give 3 units. For Pre-Meal  - 239 give 4 units. For Pre-Meal  - 264 give 5 units. For Pre-Meal  - 289 give 6 units. For Pre-Meal  - 314 give 7 units. For Pre-Meal  - 339 give 8 units. For Pre-Meal  - 364 give 9 units.  For Pre-Meal BG greater than or equal to 365 give 10 units  Patient taking differently: 1-10 Units 1 unit per 10 grams of carb, plus additional units based on following scale   For Pre-Meal  - 164 give 1 unit. For Pre-Meal  - 189 give 2 units. For Pre-Meal  - 214 give 3 units. For Pre-Meal  - 239 give 4 units. For Pre-Meal  - 264 give 5 units. For Pre-Meal  - 289 give 6 units. For Pre-Meal  - 314 give 7 units. For Pre-Meal  - 339 give 8 units. For Pre-Meal  - 364 give 9 units.  For Pre-Meal BG greater than or equal to 365 give 10 units Past Month Yes Nidhi Willett MD Yes    Multiple Vitamins-Minerals (MULTIVITAMIN GUMMIES ADULT) CHEW Take 2 each by mouth daily More than a month Yes Unknown, Entered By History     oxyCODONE (ROXICODONE) 5 MG tablet Take  5 mg by mouth every 12 hours as needed for severe pain (7-10) 2/10/2023 at PM Yes Unknown, Entered By History     bumetanide (BUMEX) 1 MG tablet Take 1 tablet (1 mg) by mouth daily 2/10/2023 at AM  Momo Walters PA-C Yes    FLUoxetine (PROZAC) 20 MG capsule Take 20 mg by mouth At Bedtime Take in addition to 40 mg capsule for a total of 60 mg at bedtime. 2/10/2023 at HS  Unknown, Entered By History No    folic acid (FOLVITE) 1 MG tablet Take 1 tablet (1 mg) by mouth daily 2/10/2023 at AM  Tez Riojas MD     insulin glargine (LANTUS PEN) 100 UNIT/ML pen Inject 20 Units Subcutaneous 2 times daily 2/11/2023 at AM  Braulio Negron MD Yes    lactulose (CEPHULAC) 10 GM packet Take 2 packets (20 g) by mouth 3 times daily 2/10/2023 at PM  Velez Reyes, German, MD No    pantoprazole (PROTONIX) 40 MG EC tablet Take 1 tablet (40 mg) by mouth daily 2/10/2023 at AM  Momo Walters PA-C     rifaximin (XIFAXAN) 550 MG TABS tablet Take 1 tablet (550 mg) by mouth 2 times daily 2/10/2023 at PM  Momo Walters PA-C     spironolactone (ALDACTONE) 100 MG tablet Take 100 mg by mouth daily 2/10/2023 at AM  Momo Walters PA-C Yes    thiamine (B-1) 100 MG tablet Take 1 tablet (100 mg) by mouth in the morning. 2/10/2023 at AM  Elizabeth Guaman MD        Current Meds    sodium chloride 0.9%  500 mL Intravenous Once     albumin human  75 g Intravenous Daily     [Held by provider] bumetanide  1 mg Oral Daily     [Held by provider] carvedilol  6.25 mg Oral BID     dextrose 10%  300 mL Intravenous Once     FLUoxetine  60 mg Oral At Bedtime     folic acid  1 mg Oral Daily     insulin aspart   Subcutaneous Daily with breakfast     insulin aspart   Subcutaneous Daily with lunch     insulin aspart   Subcutaneous Daily with supper     insulin aspart  1-10 Units Subcutaneous TID AC     insulin aspart  1-7 Units Subcutaneous At Bedtime     insulin glargine  28 Units Subcutaneous BID     lactulose  20 g Oral BID      multivitamin, therapeutic  1 tablet Per Feeding Tube Daily     pantoprazole  40 mg Oral BID     rifaximin  550 mg Oral BID     sodium chloride (PF)  3 mL Intracatheter Q8H     sodium zirconium cyclosilicate  10 g Oral TID     [Held by provider] spironolactone  100 mg Oral At Bedtime     thiamine  100 mg Oral Daily     Infusion Meds    - MEDICATION INSTRUCTIONS -       dextrose         ALLERGIES:    No Known Allergies    REVIEW OF SYSTEMS:  A comprehensive of systems was negative except as noted above.    SOCIAL HISTORY:   Social History     Socioeconomic History     Marital status: Single     Spouse name: Not on file     Number of children: Not on file     Years of education: Not on file     Highest education level: Not on file   Occupational History     Not on file   Tobacco Use     Smoking status: Former     Smokeless tobacco: Never     Tobacco comments:     A pack lasted a year, hardly smoked   Vaping Use     Vaping Use: Former   Substance and Sexual Activity     Alcohol use: Not Currently     Comment: No ETOH since 4/6/22     Drug use: Not Currently     Types: Marijuana     Sexual activity: Not on file   Other Topics Concern     Not on file   Social History Narrative    26-year-old history of autism/Asperger's on the spectrum graduated high school at [employer]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 lives with parents (Leticia and Wes) socially isolated drinks alcohol beer daily        End-stage cirrhosis noted on admission to  April 2022     Social Determinants of Health     Financial Resource Strain: Not on file   Food Insecurity: Not on file   Transportation Needs: Not on file   Physical Activity: Not on file   Stress: Not on file   Social Connections: Not on file   Intimate Partner Violence: Not on file   Housing Stability: Not on file       FAMILY MEDICAL HISTORY:   Family History   Problem Relation Age of Onset     Hypertension Mother      Substance Abuse Mother       Thyroid Disease Mother      Heart Failure Father      Substance Abuse Father      Chronic Obstructive Pulmonary Disease Father      Substance Abuse Maternal Grandfather        PHYSICAL EXAM:   Temp  Av.2  F (36.8  C)  Min: 97.1  F (36.2  C)  Max: 99.5  F (37.5  C)      Pulse  Av.7  Min: 73  Max: 111 Resp  Av.8  Min: 10  Max: 34  SpO2  Av.7 %  Min: 86 %  Max: 100 %       /60 (BP Location: Right arm)   Pulse 90   Temp 99  F (37.2  C) (Oral)   Resp 16   Wt 78.9 kg (173 lb 15.1 oz)   SpO2 99%   BMI 28.95 kg/m     Date 23 0700 - 23 0659   Shift 8225-4911 8888-6333 8332-2563 24 Hour Total   INTAKE   Colloid 100   100   Shift Total(mL/kg) 100(1.27)   100(1.27)   OUTPUT   Shift Total(mL/kg)       Weight (kg) 78.9 78.9 78.9 78.9      Admit Weight: 65.9 kg (145 lb 4.5 oz)     GENERAL APPEARANCE:  No distress,  Awake and very pleasant  EYES: no scleral icterus, pupils equal  Lymphatics: no cervical or supraclavicular LAD  Pulmonary: lungs clear to auscultation with equal breath sounds bilaterally, no clubbing  CV: regular rhythm, normal rate, no rub   - Edema +2-3 BLE   GI: soft, nontender, normal bowel sounds  MS: no evidence of inflammation in joints, no muscle tenderness  : no sethi  SKIN: no rash, warm, dry, no cyanosis  NEURO: face symmetric, no asterixis     LABS:   CMP  Recent Labs   Lab 23  0944 23  0818 23  0730 23  0638 23  0528 23  0415 23  0408 23  0222 23  0141 23  2338 23  2333 23  2146 23  2029 23  0608 23  0553 23  1822 23  1720 23  0604 23  0548 23  0745 23  0438   NA  --   --   --   --   --   --  124*  --   --   --   --   --  124*  --  125*  --  126*  --  128*  --  128*   POTASSIUM  --   --  5.9*  --   --   --  5.8*  5.8*  --  6.6*  --  6.3*  --  6.3*   < > 6.1*   < > 5.9*   < > 5.8*  --  5.0   CHLORIDE  --   --   --   --   --   --   87*  --   --   --   --   --   --   --  89*  --  89*  --  91*  --  93*   CO2  --   --   --   --   --   --  25  --   --   --   --   --   --   --  27  --  26  --  28  --  29   ANIONGAP  --   --   --   --   --   --  12  --   --   --   --   --   --   --  9  --  11  --  9  --  6*   * 154*  --  168* 184*   < > 164*   < >  --    < >  --    < >  --    < > 146*   < > 237*   < > 207*   < > 277*   BUN  --   --   --   --   --   --  64.3*  --   --   --   --   --   --   --  55.7*  --  50.9*  --  46.0*  --  35.1*   CR  --   --   --   --   --   --  1.68*  --   --   --   --   --  1.72*  --  1.50*  --  1.45*  --  1.32*  --  0.86   GFRESTIMATED  --   --   --   --   --   --  56*  --   --   --   --   --  55*  --  64  --  67  --  75  --  >90   SARTHAK  --   --   --   --   --   --  9.1  --   --   --   --   --   --   --  9.3  --  9.1  --  8.7  --  8.5*   PROTTOTAL  --   --   --   --   --   --  6.4  --   --   --   --   --   --   --  6.3*  --   --   --  5.8*  --  5.7*   ALBUMIN  --   --   --   --   --   --  4.2  --   --   --   --   --   --   --  3.7  --   --   --  3.1*  --  3.1*   BILITOTAL  --   --   --   --   --   --  6.3*  --   --   --   --   --   --   --  5.1*  --   --   --  4.7*  --  5.4*   ALKPHOS  --   --   --   --   --   --  133*  --   --   --   --   --   --   --  161*  --   --   --  153*  --  161*   AST  --   --   --   --   --   --  67*  --   --   --   --   --   --   --  75*  --   --   --  67*  --  60*   ALT  --   --   --   --   --   --  42  --   --   --   --   --   --   --  54*  --   --   --  57*  --  61*    < > = values in this interval not displayed.     CBC  Recent Labs   Lab 02/20/23 0408 02/19/23  0553 02/18/23  0548 02/17/23  0438   HGB 6.6* 7.1* 7.9* 7.2*   WBC 9.6 11.7* 12.2* 11.4*   RBC 1.95* 2.10* 2.17* 2.14*   HCT 20.3* 21.7* 22.1* 21.6*   * 103* 102* 101*   MCH 33.8* 33.8* 36.4* 33.6*   MCHC 32.5 32.7 35.7 33.3   RDW 20.1* 20.0* 19.7* 19.9*   * 127* 126* 115*     INR  Recent Labs   Lab 02/20/23 0405  02/19/23  0553 02/18/23  0548 02/17/23  0438   INR 1.68* 1.58* 1.66* 1.84*     ABG  Recent Labs   Lab 02/16/23  1826   O2PER 21      URINE STUDIES  Recent Labs   Lab Test 02/19/23  2051 01/25/23  1411 12/20/22  0658 08/14/22  1905 07/28/22  1612 06/18/22  1036   COLOR Yellow Yellow Yellow Yellow Yellow Yellow   APPEARANCE Clear Clear Clear Clear Clear Clear   URINEGLC Negative Negative 50* Negative >1000* Negative   URINEBILI Negative Negative Negative Negative Negative Negative   URINEKETONE Negative Negative Negative Negative Negative Negative   SG 1.015 1.012 1.010 1.016 1.025 1.015   UBLD Negative Negative Negative 0.03 mg/dL* Negative Negative   URINEPH 5.0 5.0 5.0 6.5 6.5 7.0   PROTEIN 30* Negative Negative Negative Negative Negative   NITRITE Negative Negative Negative Negative Negative Negative   LEUKEST Negative Negative Negative Negative Negative Negative   RBCU 26*  --   --  <1 1 <1   WBCU 16*  --   --  1 <1 1     No lab results found.  PTH  No lab results found.  IRON STUDIES  Recent Labs   Lab Test 02/16/23  0543 12/20/22  0703 10/06/22  0958 04/07/22  0624   IRON  --  249*  --  85   ASCENCION 1,725* 2,207* 2,042* 423*       IMAGING:  EXAMINATION: US ABDOMEN COMPLETE WITH DOPPLER COMPLETE 2/12/2023 8:10  AM   Impression:   1.  Patent Doppler evaluation of the hepatic vasculature.  2.   Cirrhotic morphology of the liver. Hepatomegaly.  3. Layering cholelithiasis/sludge within the gallbladder lumen. No  sonographic findings to suggest acute cholecystitis.  4. No sonographic evidence of ascites.    EXAM: XR ABDOMEN PORT 1 VIEW  2/12/2023 1:16 PM   IMPRESSION: Gastric tube tip and sidehole projecting over the proximal  stomach. No feeding tube identified..     I was present with the resident during the history and exam.  I discussed the case with the resident and agree with the findings as documented in the assessment and plan.  ASHISH, hyperkalemia, will hold spironolactone, monitor as he is evaluated for liver  transplant.  Khushbu Cabello MD   of Medicine  Department of Nephrology  [facility]

## 2023-02-21 ENCOUNTER — COMMITTEE REVIEW (OUTPATIENT)
Dept: TRANSPLANT | Facility: CLINIC | Age: 28
End: 2023-02-21
Payer: COMMERCIAL

## 2023-02-21 ENCOUNTER — APPOINTMENT (OUTPATIENT)
Dept: OCCUPATIONAL THERAPY | Facility: CLINIC | Age: 28
DRG: 005 | End: 2023-02-21
Attending: STUDENT IN AN ORGANIZED HEALTH CARE EDUCATION/TRAINING PROGRAM
Payer: COMMERCIAL

## 2023-02-21 LAB
ALBUMIN SERPL BCG-MCNC: 4.5 G/DL (ref 3.5–5.2)
ALP SERPL-CCNC: 99 U/L (ref 40–129)
ALT SERPL W P-5'-P-CCNC: 37 U/L (ref 10–50)
ANION GAP SERPL CALCULATED.3IONS-SCNC: 14 MMOL/L (ref 7–15)
ANION GAP SERPL CALCULATED.3IONS-SCNC: 15 MMOL/L (ref 7–15)
ANION GAP SERPL CALCULATED.3IONS-SCNC: 16 MMOL/L (ref 7–15)
AST SERPL W P-5'-P-CCNC: 68 U/L (ref 10–50)
BACTERIA UR CULT: NORMAL
BILIRUB DIRECT SERPL-MCNC: 2.89 MG/DL (ref 0–0.3)
BILIRUB SERPL-MCNC: 10.2 MG/DL
BUN SERPL-MCNC: 69.1 MG/DL (ref 6–20)
BUN SERPL-MCNC: 71.5 MG/DL (ref 6–20)
BUN SERPL-MCNC: 75.3 MG/DL (ref 6–20)
CALCIUM SERPL-MCNC: 9.6 MG/DL (ref 8.6–10)
CALCIUM SERPL-MCNC: 9.8 MG/DL (ref 8.6–10)
CALCIUM SERPL-MCNC: 9.9 MG/DL (ref 8.6–10)
CHLORIDE SERPL-SCNC: 87 MMOL/L (ref 98–107)
CHLORIDE SERPL-SCNC: 88 MMOL/L (ref 98–107)
CHLORIDE SERPL-SCNC: 88 MMOL/L (ref 98–107)
CREAT SERPL-MCNC: 1.79 MG/DL (ref 0.67–1.17)
CREAT SERPL-MCNC: 1.94 MG/DL (ref 0.67–1.17)
CREAT SERPL-MCNC: 2.16 MG/DL (ref 0.67–1.17)
DEPRECATED HCO3 PLAS-SCNC: 25 MMOL/L (ref 22–29)
ERYTHROCYTE [DISTWIDTH] IN BLOOD BY AUTOMATED COUNT: 22.8 % (ref 10–15)
GFR SERPL CREATININE-BSD FRML MDRD: 41 ML/MIN/1.73M2
GFR SERPL CREATININE-BSD FRML MDRD: 47 ML/MIN/1.73M2
GFR SERPL CREATININE-BSD FRML MDRD: 52 ML/MIN/1.73M2
GLUCOSE BLDC GLUCOMTR-MCNC: 104 MG/DL (ref 70–99)
GLUCOSE BLDC GLUCOMTR-MCNC: 105 MG/DL (ref 70–99)
GLUCOSE BLDC GLUCOMTR-MCNC: 105 MG/DL (ref 70–99)
GLUCOSE BLDC GLUCOMTR-MCNC: 112 MG/DL (ref 70–99)
GLUCOSE BLDC GLUCOMTR-MCNC: 127 MG/DL (ref 70–99)
GLUCOSE BLDC GLUCOMTR-MCNC: 137 MG/DL (ref 70–99)
GLUCOSE BLDC GLUCOMTR-MCNC: 75 MG/DL (ref 70–99)
GLUCOSE BLDC GLUCOMTR-MCNC: 82 MG/DL (ref 70–99)
GLUCOSE BLDC GLUCOMTR-MCNC: 97 MG/DL (ref 70–99)
GLUCOSE SERPL-MCNC: 108 MG/DL (ref 70–99)
GLUCOSE SERPL-MCNC: 81 MG/DL (ref 70–99)
GLUCOSE SERPL-MCNC: 97 MG/DL (ref 70–99)
HCT VFR BLD AUTO: 21.6 % (ref 40–53)
HGB BLD-MCNC: 7.1 G/DL (ref 13.3–17.7)
HOLD SPECIMEN: NORMAL
INR PPP: 1.87 (ref 0.85–1.15)
LACTATE SERPL-SCNC: 1.3 MMOL/L (ref 0.7–2)
MCH RBC QN AUTO: 33.6 PG (ref 26.5–33)
MCHC RBC AUTO-ENTMCNC: 32.9 G/DL (ref 31.5–36.5)
MCV RBC AUTO: 102 FL (ref 78–100)
PLATELET # BLD AUTO: 111 10E3/UL (ref 150–450)
POTASSIUM SERPL-SCNC: 4.8 MMOL/L (ref 3.4–5.3)
POTASSIUM SERPL-SCNC: 4.9 MMOL/L (ref 3.4–5.3)
POTASSIUM SERPL-SCNC: 5.4 MMOL/L (ref 3.4–5.3)
POTASSIUM SERPL-SCNC: 5.6 MMOL/L (ref 3.4–5.3)
PROT SERPL-MCNC: 6.7 G/DL (ref 6.4–8.3)
RBC # BLD AUTO: 2.11 10E6/UL (ref 4.4–5.9)
SODIUM SERPL-SCNC: 126 MMOL/L (ref 136–145)
SODIUM SERPL-SCNC: 128 MMOL/L (ref 136–145)
SODIUM SERPL-SCNC: 129 MMOL/L (ref 136–145)
WBC # BLD AUTO: 13 10E3/UL (ref 4–11)

## 2023-02-21 PROCEDURE — 250N000013 HC RX MED GY IP 250 OP 250 PS 637

## 2023-02-21 PROCEDURE — 99233 SBSQ HOSP IP/OBS HIGH 50: CPT | Performed by: INTERNAL MEDICINE

## 2023-02-21 PROCEDURE — 97535 SELF CARE MNGMENT TRAINING: CPT | Mod: GO

## 2023-02-21 PROCEDURE — 80053 COMPREHEN METABOLIC PANEL: CPT

## 2023-02-21 PROCEDURE — 250N000011 HC RX IP 250 OP 636

## 2023-02-21 PROCEDURE — 85610 PROTHROMBIN TIME: CPT

## 2023-02-21 PROCEDURE — 120N000003 HC R&B IMCU UMMC

## 2023-02-21 PROCEDURE — 99232 SBSQ HOSP IP/OBS MODERATE 35: CPT | Mod: GC | Performed by: INTERNAL MEDICINE

## 2023-02-21 PROCEDURE — 250N000011 HC RX IP 250 OP 636: Performed by: STUDENT IN AN ORGANIZED HEALTH CARE EDUCATION/TRAINING PROGRAM

## 2023-02-21 PROCEDURE — 97110 THERAPEUTIC EXERCISES: CPT | Mod: GO

## 2023-02-21 PROCEDURE — 83605 ASSAY OF LACTIC ACID: CPT | Performed by: ORTHOPAEDIC SURGERY

## 2023-02-21 PROCEDURE — 82248 BILIRUBIN DIRECT: CPT | Performed by: INTERNAL MEDICINE

## 2023-02-21 PROCEDURE — 85027 COMPLETE CBC AUTOMATED: CPT

## 2023-02-21 PROCEDURE — 36415 COLL VENOUS BLD VENIPUNCTURE: CPT

## 2023-02-21 PROCEDURE — 250N000013 HC RX MED GY IP 250 OP 250 PS 637: Performed by: STUDENT IN AN ORGANIZED HEALTH CARE EDUCATION/TRAINING PROGRAM

## 2023-02-21 PROCEDURE — P9047 ALBUMIN (HUMAN), 25%, 50ML: HCPCS

## 2023-02-21 PROCEDURE — 36415 COLL VENOUS BLD VENIPUNCTURE: CPT | Performed by: ORTHOPAEDIC SURGERY

## 2023-02-21 PROCEDURE — 80048 BASIC METABOLIC PNL TOTAL CA: CPT

## 2023-02-21 RX ORDER — LACTULOSE 10 G/10G
20 SOLUTION ORAL 3 TIMES DAILY
Status: DISCONTINUED | OUTPATIENT
Start: 2023-02-21 | End: 2023-02-22

## 2023-02-21 RX ORDER — BUMETANIDE 0.25 MG/ML
1 INJECTION INTRAMUSCULAR; INTRAVENOUS ONCE
Status: COMPLETED | OUTPATIENT
Start: 2023-02-21 | End: 2023-02-21

## 2023-02-21 RX ADMIN — PANTOPRAZOLE SODIUM 40 MG: 40 TABLET, DELAYED RELEASE ORAL at 09:59

## 2023-02-21 RX ADMIN — PANTOPRAZOLE SODIUM 40 MG: 40 TABLET, DELAYED RELEASE ORAL at 21:32

## 2023-02-21 RX ADMIN — LACTULOSE 20 G: 10 POWDER, FOR SOLUTION ORAL at 22:55

## 2023-02-21 RX ADMIN — FOLIC ACID 1 MG: 1 TABLET ORAL at 09:59

## 2023-02-21 RX ADMIN — SODIUM ZIRCONIUM CYCLOSILICATE 10 G: 10 POWDER, FOR SUSPENSION ORAL at 10:06

## 2023-02-21 RX ADMIN — ACETAMINOPHEN 650 MG: 325 TABLET ORAL at 13:01

## 2023-02-21 RX ADMIN — LACTULOSE 20 G: 10 POWDER, FOR SOLUTION ORAL at 10:00

## 2023-02-21 RX ADMIN — RIFAXIMIN 550 MG: 550 TABLET ORAL at 21:32

## 2023-02-21 RX ADMIN — BUMETANIDE 1 MG: 0.25 INJECTION INTRAMUSCULAR; INTRAVENOUS at 13:02

## 2023-02-21 RX ADMIN — THIAMINE HCL TAB 100 MG 100 MG: 100 TAB at 09:59

## 2023-02-21 RX ADMIN — RIFAXIMIN 550 MG: 550 TABLET ORAL at 09:59

## 2023-02-21 RX ADMIN — ONDANSETRON 4 MG: 2 INJECTION INTRAMUSCULAR; INTRAVENOUS at 21:24

## 2023-02-21 RX ADMIN — SODIUM ZIRCONIUM CYCLOSILICATE 10 G: 10 POWDER, FOR SUSPENSION ORAL at 22:55

## 2023-02-21 RX ADMIN — INSULIN GLARGINE 28 UNITS: 100 INJECTION, SOLUTION SUBCUTANEOUS at 10:08

## 2023-02-21 RX ADMIN — FLUOXETINE 60 MG: 20 CAPSULE ORAL at 21:32

## 2023-02-21 RX ADMIN — THERA TABS 1 TABLET: TAB at 09:59

## 2023-02-21 RX ADMIN — OXYCODONE HYDROCHLORIDE 5 MG: 5 TABLET ORAL at 22:56

## 2023-02-21 RX ADMIN — LACTULOSE 20 G: 10 POWDER, FOR SOLUTION ORAL at 15:12

## 2023-02-21 RX ADMIN — ALBUMIN HUMAN 75 G: 0.25 SOLUTION INTRAVENOUS at 10:01

## 2023-02-21 ASSESSMENT — ACTIVITIES OF DAILY LIVING (ADL)
ADLS_ACUITY_SCORE: 30
ADLS_ACUITY_SCORE: 33
ADLS_ACUITY_SCORE: 29
ADLS_ACUITY_SCORE: 29
ADLS_ACUITY_SCORE: 33
ADLS_ACUITY_SCORE: 33
ADLS_ACUITY_SCORE: 30
ADLS_ACUITY_SCORE: 33
ADLS_ACUITY_SCORE: 29
ADLS_ACUITY_SCORE: 33
ADLS_ACUITY_SCORE: 33
ADLS_ACUITY_SCORE: 30

## 2023-02-21 NOTE — PROGRESS NOTES
[facility]    Progress Note - Medicine Service, MAROON TEAM 2       Date of Admission:  2/11/2023    Assessment & Plan   Dillon Salter is a 27 year old male with history of Asperger's, alcoholic cirrhosis c/b esophageal varices and hepatic encephalopathy currently undergoing liver transplant evaluation at [facility], DM II who was directly admitted from [facility] for hematemesis, decompensated cirrhosis and hepatic encephalopathy. Hemodynamically stable with resolved encephalopathy. Course complicated by ASHISH.     Changes Today:  - expedited liver transplant eval per GI    > dobutamine stress test with cardiology, scheduled for tomorrow AM    > repeat peth level  - diurese with 1mg IV bumex today per nephrology recs, monitor BP  - decrease lokelma to 10mg BID  - repeat BMP PM      ASHISH non-oliguric   Baseline Cr <1, acutely worsened 2/18 to 1.37 from 0.8, and rising, possibly related to restarting bumex. With worsening, will trial albumin 1g/kg x3 days and continue to hold diuresis. Cr improved slightly to 1.68 on 2/20, but up to 1.79 on 2/21  - Strict I/O  - Hold coreg (per GI, though less likely due to BP)  - Trend CMP  - Avoid nephrotoxic agents   - s/p 500cc NS bolus 2/20  - s/p albumin challenge 1g/kg x3 days  - nephrology consult, appreciate recs    > IV bumex 1mg 2/21    > goal for -1L net output  - echo with 2.1cm diameter IVC    Hyperkalemia, recurrent  Moderate 6.1 2/19AM, in setting of ASHISH. Has been as high as 6.3 this admission previously thought to be related to potential absorption of blood from gastropathy, and/or insulin deficient state, now most likely elevated in the setting of ASHISH. Has received lokelma and kayexalate on several occassions, shifted with insulin multiple times. No associated EKG changes on multiple checks.   - Trend K   - reduce Lokelma 10g from TID to BID 2/21  - nephrology consult, as above    Acute on chronic blood loss  anemia- stable  Hematemesis- resolved  Hx of esophageal varices   Portal hypertensive gastropathy  Esophageal ulceration  Recurrent rectal varices  2-3 episodes hematemesis prior to ED of ~400 mL, ~325 mL at ER, 250 mL anu hematemesis since arrival. HDS. Given 1 unit pRBCs on 2/12 for Hgb of 6.9. INR 1.48. Esophageal varices recently banded 1/23/23. 2 20 gauge IVs in place. EGD 2/12 AM: diffuse oozing likely 2/2 portal hypertensive gastropathy, no bleeding seen from recent banding, nonbleeding esophageal ulcers seen. No continued hematemesis. Transfused for Hgb 6.8 on 2/15 and for Hgb 6.6 on 2/20.  - trend hgb daily and transfuse for <7  - pantoprazole 40mg BID  - finished course of ceftriaxone 1g qday for total of 7d  - octreotide discontinued 2/13, restarted evening of 2/13 for presumed hematemesis, discontinued 2/14  - coreg 6.25mg BID, holding now for ASHISH  - GI following    > follow up with GI for a repeat EGD with possible RFA in 3 months    > will need CMP and CBC 1 week after discharge (GI to set up)    Hepatic encephalopathy, improving  Decompensated alcohol related cirrhosis c/b esophageal varices   Portal HTN and splenomegaly  Coagulopathy due to liver disease  Thrombocytopenia due to liver disease, present on admission  Follows with Dr. Wood. MELD-Na 24 today  Etiology: Alcohol, diagnosed 4/2022  HE: lactulose BID, continue rifaximin  EV/GV: esophageal varices s/p banding 1/23/23, stable on EGD 2/12 without evidence of bleeding; on carvedilol 6.25mg BID, held 2/18 with ASHISH  Coagulopathy: INR 1.48,  (suspect 171 on admission was hemoconcentration)  Ascites: None per RUQ 2/12, held PTA bumex 1 mg and spironolactone given ASHISH -> bumex restarted 2/16, held 2/18 with ASHISH  SBP:  no h/o   HCC: last CT 1/2023, no evidence of  Liver transplant candidacy: Last drink 4/2022, follows with Dr. Wood. Ongoing liver transplant eval outpatient including Chemical dependency assessment and programming/Mental health  referral, ongoing. CT angiogram, scheduled 4/2023. PFTs given history of tobacco use  - GI consulted, appreciate recs  - daily MELD labs  - NG placement for lactulose administration per [facility] protocol 2/12, NG tube removed 2/13 as patient's mental status improved and able to take meds PO  - <2g Na, <2L water, >1.5 g/kg/day protein diet  - Bcx NGTD  - expedited liver transplant eval per GI    > dobutamine stress test with cardiology    > repeat peth level    MELD-Na score: 32 at 2/21/2023  4:31 AM  MELD score: 28 at 2/21/2023  4:31 AM  Calculated from:  Serum Creatinine: 1.79 mg/dL at 2/21/2023  4:31 AM  Serum Sodium: 126 mmol/L at 2/21/2023  4:31 AM  Total Bilirubin: 10.2 mg/dL at 2/21/2023  4:31 AM  INR(ratio): 1.87 at 2/21/2023  4:31 AM  Age: 27 years    Hyperglycemia  DM II - hgb A1c 5.6%  - glargine increased to 28 units BID on 2/18 (home dose is 20 units BID, have been titrating here)  - high dose sliding scale insulin  - hypoglycemia protocol    Leukocytosis  Suspect reactive in setting of UGIB. No focal exam or history otherwise. HDS, has been afebrile.  - received ceftriaxone for UGIB for SBP ppx as above  - trend   - Bcx NGTD    Hypervolemic hyponatremia  Na 133 on admission, has been slowly trending down. Edema in feet and ankles noted on 2/16, slightly improved with elevation of legs.   - restarted bumex, but holding now given ASHISH  - holding spironolactone given hyperkalemia  - salt restriction    Lactic acidosis, resolved  Lactate 2.8 on admission. Likely 2/2 hypovolemia in the setting of acute GIB. Given 500 ml w/ K shifting on 2/12, then 1u pRBC  - trend, give additional fluids/blood as needed     CHRONIC & STABLE PROBLEMS  MDD/anxiety   Autism spectrum   Mother is his caretaker, lives with parents.   - continue PTA fluoxetine  - previously his mother, Leticia, has been able to stay with patient overnight which helps with mood/anxiety. This was discussed with charge RN    Chronic pain,  musculoskeletal  - Continue PTA oxycodone 5mg BID PRN  - Gabapentin not a good option - previously caused excessive drowsiness  - Continue Tylenol, max 2 Gm daily  - Will discuss other agents    Malnutrition:   Level of malnutrition: Severe   - nutrition plan (see note from 2/14)  --> Discontinue clear liquid supplements  --> Add Ensure Plus (vanilla) at 2 PM and Special K protein bar once stephen, also at 2 PM per request       Diet: Snacks/Supplements Adult: Other; See comments below; With Meals  Combination Diet Regular Diet; 2 gm K Diet; High Consistent Carb (75 g CHO per Meal) Diet  Diet    DVT Prophylaxis: Pneumatic compression device  Negron Catheter: Not present  Fluids: None  Lines: None     Cardiac Monitoring: ACTIVE order. Indication: Electrolyte Imbalance (24 hours)- Magnesium <1.3 mg/ml; Potassium < =2.8 or > 5.5 mg/ml  Code Status: Full Code         Disposition Plan      The patient's care was discussed with the Attending Physician, Dr. Kapadia.    Narcisa Willard MS3  Medical Student  [place] [id number] Service    Resident/Fellow Attestation   I, Radha Granado MD, was present with the medical/CHRISTA student who participated in the service and in the documentation of the note.  I have verified the history and personally performed the physical exam and medical decision making.  I agree with the assessment and plan of care as documented in the note.      Radha Granado MD  PGY2  Date of Service (when I saw the patient): 02/21/23   ___________________________    Interval History   Nursing notes reviewed. No acute events overnight. Patient remains tired this morning but is awake and oriented. Has been having a BM every 2 hours overnight. Continued dry cough. Edema in lower extremities and feet. Patient notes that he does feel bloated and full of fluid. Yesterday GI started expedited liver transplant workup.  His appetite is still good. No chest pain, palpitations, shortness of breath, abdominal pain, no  hematochezia/melena.      Physical Exam   Vital Signs: Temp: 99.5  F (37.5  C) Temp src: Oral BP: 119/65 Pulse: 97   Resp: 20 SpO2: 96 % O2 Device: Nasal cannula Oxygen Delivery: 1 LPM  Weight: 176 lbs 5.89 oz    General Appearance: Awake, alert, NAD  Respiratory: CTAB, no wheezing, no increased WOB  Cardiovascular: RRR, III/VI systolic murmur, extremities warm and well perfused  GI: Soft, nontender, no fluid wave  Skin: No rash, some bruising on the lower extremities, 1+ edema in feet/ankles  Neuro: Oriented x3, no focal deficits. Tremor in bilateral upper extremities. No asterixis.    Medical Decision Making      Please see A&P for additional details of medical decision making.      Data   All labs and imaging reviewed

## 2023-02-21 NOTE — PLAN OF CARE
Neuro: Lethargic at beginning of shift, but pt oriented x4 t/o shift. Became more alert t/o shift. Calm, cooperative  Cardiac: SR. VSS.   Respiratory: Sating >92%  on RA. LS coarse t/o  GI/: multiple loose BMs overnight, and a few unmeasured urine occurrences, so difficult to measure UOP  Diet/appetite: Tolerating 2G K+, consistent carb diet. Appetite is fair.  Activity:  Assist of 1, up to bathroom  Pain: At acceptable level on current regimen. Pt refused dose of oxy last night, and aware he can have any time he needs  Skin: No new deficits noted.  LDA's: PIV x2, SL    Plan: Continue with POC. Notify primary team with changes.

## 2023-02-21 NOTE — PLAN OF CARE
Pt VSS, on RA.  Has been lethargic, but easily aroused.  PRN lactulose given x 1.  No BM this shift.  BG stable.  No appetite, has not eaten any meals today.  Void x 1.  Ambulating to BR.  K+ recheck and VBG pending.  One unit PRBC transfused for Hgb of 6.6 this morning.  Mom at bedside assisting pt.  Call light within reach.  Continue to monitor.

## 2023-02-21 NOTE — PROGRESS NOTES
GASTROENTEROLOGY PROGRESS NOTE    Date: 02/21/2023     ASSESSMENT: 27 year old male with a medical history of Asperger's, T2DM, alcohol related cirrhosis c/b hepatic encephalopathy, history of esophageal varices bleeding (5/2022, 1/2023) and rectal varices s/p sclerotherapy 1/2023, ascites presenting with multiple episodes of hematemesis. Underwent EGD 2/11 showed oozing portal hypertensive gastropathy, and healed esophageal ulcer.    Decompensated cirrhosis with hepatic encephalopathy, h/o esophageal variceal bleed, HE, MELD-Na 30  Rectal varices, s/p sclerotherapy 1/2023  Protein calories malnutrition  Follows with Dr. Wood. Has PEth positive 1/26/23 in the setting of post transfusion (which could cause false positive level). Patient reports last drink 4/2022 and recently started on alc rehab program in Jan 2023.    Liver transplant eval: liver transplant conference 2/21 patient is a good candidate. Now pending to be listed pending dobutamine stress test (scheduled for 2/22), and prior to authorization for the liver transplant.    Portal hypertensive gastropathy, bleeding, improved  EGD showed oozing portal hypertensive gastropathy. Starting on-selective b-blocker for PHG and also for  history of esophageal varices bleeding. No further bleeding and stable Hgb.     ASHISH, worsening. Initially likely from acute blood loss then now with diuretics use.     RECOMMENDATIONS:  - Dobutamine stress test tomorrow. Now pending to be listed pending dobutamine stress test (scheduled for 2/22), and prior to authorization for the liver transplant.  - Holding carvedilol, though less likely to be the cause of ASHISH given no hypotension episode while on it.  - ASHISH per nephrology, is getting albumin challenge, and now started on diuretic per renal  - lactulose titrate to 3 BMs/day, and rifaximin  - 2 g low sodium diet; ensure high protein intake. PT/OT  - pending [facility]    Gastroenterology follow up recommendations: Dr. Wood scheduled  for 4/18/23      Discussed with Dr. Flynn.   Lita Vences MD  Gastroenterology/Hepatology Fellow  _____________________________________________________    Subjective: No overt blood loss. Confused overnight, got extra lactulose x3 doses.  Still having leg swelling.      Objective:  Blood pressure 119/65, pulse 97, temperature 99.5  F (37.5  C), temperature source Oral, resp. rate 20, weight 80 kg (176 lb 5.9 oz), SpO2 96 %.  Limited physical exam, patient was sleeping.  Gen:NAD  HEENT: jaundice  CV: RRR  Lungs: breathing comfortably on room air  Abd: not distended  Skin: no jaundice  Neuro: non focal    LABS:  BMP  Recent Labs   Lab 02/21/23  1305 02/21/23  1148 02/21/23  0730 02/21/23  0533 02/21/23  0431 02/21/23  0020 02/20/23  2332 02/20/23  2227 02/20/23  2017 02/20/23  1550 02/20/23  1414 02/20/23  0415 02/20/23  0408 02/19/23  2146 02/19/23  2029 02/19/23  0608 02/19/23  0553 02/18/23  1822 02/18/23  1720   NA  --   --   --   --  126*  --   --   --   --   --   --   --  124*  --  124*  --  125*  --  126*   POTASSIUM  --   --   --   --  5.4*  --  5.6*  --  5.8*  --  5.4*   < > 5.8*  5.8*   < > 6.3*   < > 6.1*   < > 5.9*   CHLORIDE  --   --   --   --  87*  --   --   --   --   --   --   --  87*  --   --   --  89*  --  89*   SARTHAK  --   --   --   --  9.6  --   --   --   --   --   --   --  9.1  --   --   --  9.3  --  9.1   CO2  --   --   --   --  25  --   --   --   --   --   --   --  25  --   --   --  27  --  26   BUN  --   --   --   --  69.1*  --   --   --   --   --   --   --  64.3*  --   --   --  55.7*  --  50.9*   CR  --   --   --   --  1.79*  --   --   --   --   --   --   --  1.68*  --  1.72*  --  1.50*  --  1.45*   GLC 82 75 127* 105* 108*   < >  --    < >  --    < >  --    < > 164*   < >  --    < > 146*   < > 237*    < > = values in this interval not displayed.     CBC  Recent Labs   Lab 02/21/23  0431 02/20/23  0408 02/19/23  0553 02/18/23  0548   WBC 13.0* 9.6 11.7* 12.2*   RBC 2.11* 1.95* 2.10*  2.17*   HGB 7.1* 6.6* 7.1* 7.9*   HCT 21.6* 20.3* 21.7* 22.1*   * 104* 103* 102*   MCH 33.6* 33.8* 33.8* 36.4*   MCHC 32.9 32.5 32.7 35.7   RDW 22.8* 20.1* 20.0* 19.7*   * 106* 127* 126*     INR  Recent Labs   Lab 02/21/23  0431 02/20/23  0408 02/19/23  0553 02/18/23  0548   INR 1.87* 1.68* 1.58* 1.66*     LFTs  Recent Labs   Lab 02/21/23 0431 02/20/23  0408 02/19/23  0553 02/18/23  0548   ALKPHOS 99 133* 161* 153*   AST 68* 67* 75* 67*   ALT 37 42 54* 57*   BILITOTAL 10.2* 6.3* 5.1* 4.7*   PROTTOTAL 6.7 6.4 6.3* 5.8*   ALBUMIN 4.5 4.2 3.7 3.1*      PANC  No lab results found in last 7 days.MELD-Na score: 32 at 2/21/2023  4:31 AM  MELD score: 28 at 2/21/2023  4:31 AM  Calculated from:  Serum Creatinine: 1.79 mg/dL at 2/21/2023  4:31 AM  Serum Sodium: 126 mmol/L at 2/21/2023  4:31 AM  Total Bilirubin: 10.2 mg/dL at 2/21/2023  4:31 AM  INR(ratio): 1.87 at 2/21/2023  4:31 AM  Age: 27 years    IMAGING:  Reviewed.

## 2023-02-21 NOTE — PROGRESS NOTES
Time of notification: 9:50 PM  Provider notified: Moises Grayson MD  Patient status: Provider paged regarding K=5.8  Orders received: awaiting callback    Time of notification: 11:00 PM  Provider notified: Moises Grayson MD  Patient status: Provider paged to inquire if potassium should be drawn before morning.   Orders received: Call back was received and order placed for a potassium drawn @000 on 2/21    Time of notification: 1:00 AM  Provider notified: Moises Grayson MD  Patient status: Provider paged and notified of potassium recheck of 5.6.  Orders received: No new orders or call back received

## 2023-02-21 NOTE — COMMITTEE REVIEW
Abdominal Committee Review Note     Evaluation Date: 12/20/2022  Committee Review Date: 2/21/2023    Organ being evaluated for: Liver    Transplant Phase: Evaluation  Transplant Status: Active    Transplant Coordinator: Deni Hicks Jr.  Transplant Surgeon:   John Rondon    Referring Physician: Elizabeth Guaman    Primary Diagnosis: Alcoholic Cirrhosis  Secondary Diagnosis:     Committee Review Members:  Nutrition La Nena Whitley, RD   Pharmacist Cristopher Brooks, [facility]    - Clinical Brandon Cummins, CHRISTIAN, Criselda Brewer, [facility]   Transplant Rosita Cohn PA-C, Luana Pruitt LPN, [place], CONI, Jr Deni Hicks, CONI, Vicky Peres, KODI, Juan Mendez, RN   Transplant Hepatology  Marilin Jones MD, Kimi Mayorga MD, Lillie Flynn MD, Viola Nelson MD, Gavino Jackson MD   Transplant Surgery Ryder Shaikh MD, Romeo Acevedo MD, Jerson Sallie Rodas MD, John Rondon MD, Anthony Rodas, DO       Transplant Eligibility: Cirrhosis with MELD, ETOH    Committee Review Decision: Approved    Relative Contraindications: None    Absolute Contraindications: None    Committee Chair Lillie Flynn MD verbally attested to the committee's decision.    Committee Discussion Details:      Approved for listing    NOTE: patient had positive PeTH while admitted 1/21/23-1/29/23. Patient present for GIB, received multiple blood transfusions, and had a negative PeTH on 1/23/23. This turned positive on 1/26/23 (during admission) and is attributed to transfusions he received prior to this blood draw.

## 2023-02-21 NOTE — PLAN OF CARE
Goal Outcome Evaluation:    Neuro: A&Ox4. Lethargic most of day d/t not getting enough sleep last night. More alert this evening while up in recliner.  Cardiac: SR. VSS.   Respiratory: Sating 89-91% on RA. 1L Nc at times. GONZALEZ.  GI/: Adequate urine output; unmeasured urine occurrences. Loose BM X2.  Diet/appetite: Tolerating 2g K+ and consistent carb diet. Poor appetite. Mother bringing in supper this evening.  Activity:  Assist of 1 up to chair and in halls.  Pain: At acceptable level on current regimen.   Skin: No new deficits noted.  LDA's: right and left PIV, saline locked     Plan: Continue with POC. Notify primary team with changes.

## 2023-02-22 ENCOUNTER — APPOINTMENT (OUTPATIENT)
Dept: OCCUPATIONAL THERAPY | Facility: CLINIC | Age: 28
DRG: 005 | End: 2023-02-22
Attending: STUDENT IN AN ORGANIZED HEALTH CARE EDUCATION/TRAINING PROGRAM
Payer: COMMERCIAL

## 2023-02-22 ENCOUNTER — APPOINTMENT (OUTPATIENT)
Dept: CARDIOLOGY | Facility: CLINIC | Age: 28
DRG: 005 | End: 2023-02-22
Attending: STUDENT IN AN ORGANIZED HEALTH CARE EDUCATION/TRAINING PROGRAM
Payer: COMMERCIAL

## 2023-02-22 ENCOUNTER — APPOINTMENT (OUTPATIENT)
Dept: GENERAL RADIOLOGY | Facility: CLINIC | Age: 28
DRG: 005 | End: 2023-02-22
Attending: STUDENT IN AN ORGANIZED HEALTH CARE EDUCATION/TRAINING PROGRAM
Payer: COMMERCIAL

## 2023-02-22 ENCOUNTER — DOCUMENTATION ONLY (OUTPATIENT)
Dept: TRANSPLANT | Facility: CLINIC | Age: 28
End: 2023-02-22

## 2023-02-22 LAB
ALBUMIN SERPL BCG-MCNC: 4.6 G/DL (ref 3.5–5.2)
ALBUMIN UR-MCNC: NEGATIVE MG/DL
ALP SERPL-CCNC: 85 U/L (ref 40–129)
ALT SERPL W P-5'-P-CCNC: 34 U/L (ref 10–50)
AMMONIA PLAS-SCNC: 49 UMOL/L (ref 16–60)
ANION GAP SERPL CALCULATED.3IONS-SCNC: 14 MMOL/L (ref 7–15)
ANION GAP SERPL CALCULATED.3IONS-SCNC: 16 MMOL/L (ref 7–15)
ANION GAP SERPL CALCULATED.3IONS-SCNC: 17 MMOL/L (ref 7–15)
APPEARANCE UR: CLEAR
AST SERPL W P-5'-P-CCNC: 65 U/L (ref 10–50)
BASOPHILS # BLD AUTO: 0 10E3/UL (ref 0–0.2)
BASOPHILS NFR BLD AUTO: 0 %
BILIRUB DIRECT SERPL-MCNC: 3.24 MG/DL (ref 0–0.3)
BILIRUB SERPL-MCNC: 10.3 MG/DL
BILIRUB SERPL-MCNC: 10.4 MG/DL
BILIRUB UR QL STRIP: NEGATIVE
BLD PROD TYP BPU: NORMAL
BLOOD COMPONENT TYPE: NORMAL
BUN SERPL-MCNC: 79.5 MG/DL (ref 6–20)
BUN SERPL-MCNC: 84 MG/DL (ref 6–20)
BUN SERPL-MCNC: 86 MG/DL (ref 6–20)
CALCIUM SERPL-MCNC: 9.4 MG/DL (ref 8.6–10)
CALCIUM SERPL-MCNC: 9.5 MG/DL (ref 8.6–10)
CALCIUM SERPL-MCNC: 9.8 MG/DL (ref 8.6–10)
CHLORIDE SERPL-SCNC: 87 MMOL/L (ref 98–107)
CHLORIDE SERPL-SCNC: 89 MMOL/L (ref 98–107)
CHLORIDE SERPL-SCNC: 90 MMOL/L (ref 98–107)
CODING SYSTEM: NORMAL
COLOR UR AUTO: YELLOW
CREAT SERPL-MCNC: 2.1 MG/DL (ref 0.67–1.17)
CREAT SERPL-MCNC: 2.26 MG/DL (ref 0.67–1.17)
CREAT SERPL-MCNC: 2.29 MG/DL (ref 0.67–1.17)
CROSSMATCH: NORMAL
DEPRECATED HCO3 PLAS-SCNC: 23 MMOL/L (ref 22–29)
DEPRECATED HCO3 PLAS-SCNC: 25 MMOL/L (ref 22–29)
DEPRECATED HCO3 PLAS-SCNC: 26 MMOL/L (ref 22–29)
EOSINOPHIL # BLD AUTO: 0.1 10E3/UL (ref 0–0.7)
EOSINOPHIL NFR BLD AUTO: 1 %
ERYTHROCYTE [DISTWIDTH] IN BLOOD BY AUTOMATED COUNT: 21.2 % (ref 10–15)
ERYTHROCYTE [DISTWIDTH] IN BLOOD BY AUTOMATED COUNT: 21.5 % (ref 10–15)
FERRITIN SERPL-MCNC: 1465 NG/ML (ref 31–409)
GFR SERPL CREATININE-BSD FRML MDRD: 39 ML/MIN/1.73M2
GFR SERPL CREATININE-BSD FRML MDRD: 39 ML/MIN/1.73M2
GFR SERPL CREATININE-BSD FRML MDRD: 43 ML/MIN/1.73M2
GLUCOSE BLDC GLUCOMTR-MCNC: 109 MG/DL (ref 70–99)
GLUCOSE BLDC GLUCOMTR-MCNC: 120 MG/DL (ref 70–99)
GLUCOSE BLDC GLUCOMTR-MCNC: 141 MG/DL (ref 70–99)
GLUCOSE BLDC GLUCOMTR-MCNC: 70 MG/DL (ref 70–99)
GLUCOSE BLDC GLUCOMTR-MCNC: 76 MG/DL (ref 70–99)
GLUCOSE BLDC GLUCOMTR-MCNC: 84 MG/DL (ref 70–99)
GLUCOSE SERPL-MCNC: 105 MG/DL (ref 70–99)
GLUCOSE SERPL-MCNC: 111 MG/DL (ref 70–99)
GLUCOSE SERPL-MCNC: 115 MG/DL (ref 70–99)
GLUCOSE UR STRIP-MCNC: NEGATIVE MG/DL
HCT VFR BLD AUTO: 20.8 % (ref 40–53)
HCT VFR BLD AUTO: 21.3 % (ref 40–53)
HGB BLD-MCNC: 6.7 G/DL (ref 13.3–17.7)
HGB BLD-MCNC: 7 G/DL (ref 13.3–17.7)
HGB BLD-MCNC: 7.6 G/DL (ref 13.3–17.7)
HGB UR QL STRIP: NEGATIVE
HOLD SPECIMEN: NORMAL
HYALINE CASTS: 10 /LPF
IMM GRANULOCYTES # BLD: 0.2 10E3/UL
IMM GRANULOCYTES NFR BLD: 2 %
INR PPP: 2.18 (ref 0.85–1.15)
IRON BINDING CAPACITY (ROCHE): 136 UG/DL (ref 240–430)
IRON SATN MFR SERPL: 19 % (ref 15–46)
IRON SERPL-MCNC: 26 UG/DL (ref 61–157)
ISSUE DATE AND TIME: NORMAL
KETONES UR STRIP-MCNC: NEGATIVE MG/DL
LACTATE SERPL-SCNC: 1.1 MMOL/L (ref 0.7–2)
LDH SERPL L TO P-CCNC: 285 U/L (ref 0–250)
LEUKOCYTE ESTERASE UR QL STRIP: NEGATIVE
LYMPHOCYTES # BLD AUTO: 0.6 10E3/UL (ref 0.8–5.3)
LYMPHOCYTES NFR BLD AUTO: 5 %
MCH RBC QN AUTO: 32.8 PG (ref 26.5–33)
MCH RBC QN AUTO: 33.5 PG (ref 26.5–33)
MCHC RBC AUTO-ENTMCNC: 32.2 G/DL (ref 31.5–36.5)
MCHC RBC AUTO-ENTMCNC: 32.9 G/DL (ref 31.5–36.5)
MCV RBC AUTO: 102 FL (ref 78–100)
MCV RBC AUTO: 102 FL (ref 78–100)
MONOCYTES # BLD AUTO: 1.1 10E3/UL (ref 0–1.3)
MONOCYTES NFR BLD AUTO: 8 %
MUCOUS THREADS #/AREA URNS LPF: PRESENT /LPF
NEUTROPHILS # BLD AUTO: 11.2 10E3/UL (ref 1.6–8.3)
NEUTROPHILS NFR BLD AUTO: 84 %
NITRATE UR QL: NEGATIVE
NRBC # BLD AUTO: 0 10E3/UL
NRBC BLD AUTO-RTO: 0 /100
PH UR STRIP: 5 [PH] (ref 5–7)
PLATELET # BLD AUTO: 110 10E3/UL (ref 150–450)
PLATELET # BLD AUTO: 114 10E3/UL (ref 150–450)
PLPETH BLD-MCNC: <10 NG/ML
POPETH BLD-MCNC: <10 NG/ML
POTASSIUM SERPL-SCNC: 4.7 MMOL/L (ref 3.4–5.3)
POTASSIUM SERPL-SCNC: 5 MMOL/L (ref 3.4–5.3)
POTASSIUM SERPL-SCNC: 5 MMOL/L (ref 3.4–5.3)
PROT SERPL-MCNC: 6.7 G/DL (ref 6.4–8.3)
RBC # BLD AUTO: 2.04 10E6/UL (ref 4.4–5.9)
RBC # BLD AUTO: 2.09 10E6/UL (ref 4.4–5.9)
RBC URINE: 1 /HPF
RETICS # AUTO: 0.09 10E6/UL (ref 0.03–0.1)
RETICS/RBC NFR AUTO: 4.6 % (ref 0.5–2)
SODIUM SERPL-SCNC: 127 MMOL/L (ref 136–145)
SODIUM SERPL-SCNC: 129 MMOL/L (ref 136–145)
SODIUM SERPL-SCNC: 131 MMOL/L (ref 136–145)
SP GR UR STRIP: 1.01 (ref 1–1.03)
SQUAMOUS EPITHELIAL: 2 /HPF
TRANSFERRIN SERPL-MCNC: 115 MG/DL (ref 200–360)
TRANSITIONAL EPI: <1 /HPF
UNIT ABO/RH: NORMAL
UNIT NUMBER: NORMAL
UNIT STATUS: NORMAL
UNIT TYPE ISBT: 600
UROBILINOGEN UR STRIP-MCNC: NORMAL MG/DL
WBC # BLD AUTO: 13.3 10E3/UL (ref 4–11)
WBC # BLD AUTO: 14 10E3/UL (ref 4–11)
WBC URINE: 2 /HPF

## 2023-02-22 PROCEDURE — 85027 COMPLETE CBC AUTOMATED: CPT

## 2023-02-22 PROCEDURE — 93350 STRESS TTE ONLY: CPT | Mod: 26 | Performed by: INTERNAL MEDICINE

## 2023-02-22 PROCEDURE — 250N000011 HC RX IP 250 OP 636

## 2023-02-22 PROCEDURE — 93321 DOPPLER ECHO F-UP/LMTD STD: CPT | Mod: 26 | Performed by: INTERNAL MEDICINE

## 2023-02-22 PROCEDURE — 250N000013 HC RX MED GY IP 250 OP 250 PS 637

## 2023-02-22 PROCEDURE — 250N000011 HC RX IP 250 OP 636: Performed by: STUDENT IN AN ORGANIZED HEALTH CARE EDUCATION/TRAINING PROGRAM

## 2023-02-22 PROCEDURE — 99233 SBSQ HOSP IP/OBS HIGH 50: CPT | Performed by: INTERNAL MEDICINE

## 2023-02-22 PROCEDURE — 83010 ASSAY OF HAPTOGLOBIN QUANT: CPT

## 2023-02-22 PROCEDURE — 93325 DOPPLER ECHO COLOR FLOW MAPG: CPT | Mod: TC

## 2023-02-22 PROCEDURE — 99233 SBSQ HOSP IP/OBS HIGH 50: CPT | Mod: GC | Performed by: INTERNAL MEDICINE

## 2023-02-22 PROCEDURE — 85018 HEMOGLOBIN: CPT | Performed by: INTERNAL MEDICINE

## 2023-02-22 PROCEDURE — 84466 ASSAY OF TRANSFERRIN: CPT

## 2023-02-22 PROCEDURE — C8928 TTE W OR W/O FOL W/CON,STRES: HCPCS

## 2023-02-22 PROCEDURE — 85060 BLOOD SMEAR INTERPRETATION: CPT | Performed by: PATHOLOGY

## 2023-02-22 PROCEDURE — 255N000002 HC RX 255 OP 636: Performed by: INTERNAL MEDICINE

## 2023-02-22 PROCEDURE — 71045 X-RAY EXAM CHEST 1 VIEW: CPT

## 2023-02-22 PROCEDURE — 93016 CV STRESS TEST SUPVJ ONLY: CPT | Performed by: INTERNAL MEDICINE

## 2023-02-22 PROCEDURE — 85610 PROTHROMBIN TIME: CPT

## 2023-02-22 PROCEDURE — 85045 AUTOMATED RETICULOCYTE COUNT: CPT

## 2023-02-22 PROCEDURE — 83615 LACTATE (LD) (LDH) ENZYME: CPT

## 2023-02-22 PROCEDURE — 97535 SELF CARE MNGMENT TRAINING: CPT | Mod: GO

## 2023-02-22 PROCEDURE — 93325 DOPPLER ECHO COLOR FLOW MAPG: CPT | Mod: 26 | Performed by: INTERNAL MEDICINE

## 2023-02-22 PROCEDURE — 82140 ASSAY OF AMMONIA: CPT | Performed by: STUDENT IN AN ORGANIZED HEALTH CARE EDUCATION/TRAINING PROGRAM

## 2023-02-22 PROCEDURE — 36415 COLL VENOUS BLD VENIPUNCTURE: CPT | Performed by: STUDENT IN AN ORGANIZED HEALTH CARE EDUCATION/TRAINING PROGRAM

## 2023-02-22 PROCEDURE — 81001 URINALYSIS AUTO W/SCOPE: CPT

## 2023-02-22 PROCEDURE — 83550 IRON BINDING TEST: CPT

## 2023-02-22 PROCEDURE — 99233 SBSQ HOSP IP/OBS HIGH 50: CPT | Mod: GC | Performed by: STUDENT IN AN ORGANIZED HEALTH CARE EDUCATION/TRAINING PROGRAM

## 2023-02-22 PROCEDURE — 120N000003 HC R&B IMCU UMMC

## 2023-02-22 PROCEDURE — 250N000011 HC RX IP 250 OP 636: Performed by: INTERNAL MEDICINE

## 2023-02-22 PROCEDURE — 250N000013 HC RX MED GY IP 250 OP 250 PS 637: Performed by: STUDENT IN AN ORGANIZED HEALTH CARE EDUCATION/TRAINING PROGRAM

## 2023-02-22 PROCEDURE — 83605 ASSAY OF LACTIC ACID: CPT | Performed by: INTERNAL MEDICINE

## 2023-02-22 PROCEDURE — 85025 COMPLETE CBC W/AUTO DIFF WBC: CPT

## 2023-02-22 PROCEDURE — 80048 BASIC METABOLIC PNL TOTAL CA: CPT | Performed by: INTERNAL MEDICINE

## 2023-02-22 PROCEDURE — 97530 THERAPEUTIC ACTIVITIES: CPT | Mod: GO

## 2023-02-22 PROCEDURE — 258N000003 HC RX IP 258 OP 636: Performed by: INTERNAL MEDICINE

## 2023-02-22 PROCEDURE — 82248 BILIRUBIN DIRECT: CPT

## 2023-02-22 PROCEDURE — 80053 COMPREHEN METABOLIC PANEL: CPT

## 2023-02-22 PROCEDURE — 36415 COLL VENOUS BLD VENIPUNCTURE: CPT

## 2023-02-22 PROCEDURE — P9016 RBC LEUKOCYTES REDUCED: HCPCS

## 2023-02-22 PROCEDURE — 71045 X-RAY EXAM CHEST 1 VIEW: CPT | Mod: 26 | Performed by: RADIOLOGY

## 2023-02-22 PROCEDURE — 82728 ASSAY OF FERRITIN: CPT

## 2023-02-22 PROCEDURE — 36415 COLL VENOUS BLD VENIPUNCTURE: CPT | Performed by: INTERNAL MEDICINE

## 2023-02-22 PROCEDURE — 250N000009 HC RX 250: Performed by: INTERNAL MEDICINE

## 2023-02-22 PROCEDURE — 93321 DOPPLER ECHO F-UP/LMTD STD: CPT | Mod: TC

## 2023-02-22 PROCEDURE — 93018 CV STRESS TEST I&R ONLY: CPT | Performed by: INTERNAL MEDICINE

## 2023-02-22 RX ORDER — BUMETANIDE 0.25 MG/ML
1 INJECTION INTRAMUSCULAR; INTRAVENOUS ONCE
Status: COMPLETED | OUTPATIENT
Start: 2023-02-22 | End: 2023-02-22

## 2023-02-22 RX ORDER — ATROPINE SULFATE 0.4 MG/ML
.2-2 AMPUL (ML) INJECTION
Status: COMPLETED | OUTPATIENT
Start: 2023-02-22 | End: 2023-02-22

## 2023-02-22 RX ORDER — BUMETANIDE 0.25 MG/ML
3 INJECTION INTRAMUSCULAR; INTRAVENOUS ONCE
Status: COMPLETED | OUTPATIENT
Start: 2023-02-22 | End: 2023-02-22

## 2023-02-22 RX ORDER — LACTULOSE 10 G/10G
20 SOLUTION ORAL 2 TIMES DAILY
Status: DISCONTINUED | OUTPATIENT
Start: 2023-02-22 | End: 2023-02-28

## 2023-02-22 RX ORDER — DOBUTAMINE HYDROCHLORIDE 200 MG/100ML
10-50 INJECTION INTRAVENOUS CONTINUOUS
Status: DISCONTINUED | OUTPATIENT
Start: 2023-02-22 | End: 2023-02-22

## 2023-02-22 RX ORDER — DEXTROSE MONOHYDRATE 100 MG/ML
INJECTION, SOLUTION INTRAVENOUS CONTINUOUS PRN
Status: DISCONTINUED | OUTPATIENT
Start: 2023-02-22 | End: 2023-02-28

## 2023-02-22 RX ORDER — OXYCODONE HYDROCHLORIDE 5 MG/1
5 TABLET ORAL 2 TIMES DAILY PRN
Status: CANCELLED | OUTPATIENT
Start: 2023-02-22

## 2023-02-22 RX ORDER — METOPROLOL TARTRATE 1 MG/ML
1-20 INJECTION, SOLUTION INTRAVENOUS
Status: DISCONTINUED | OUTPATIENT
Start: 2023-02-22 | End: 2023-02-22

## 2023-02-22 RX ORDER — SODIUM CHLORIDE 9 MG/ML
INJECTION, SOLUTION INTRAVENOUS CONTINUOUS
Status: DISCONTINUED | OUTPATIENT
Start: 2023-02-22 | End: 2023-02-22

## 2023-02-22 RX ORDER — BUMETANIDE 0.25 MG/ML
2 INJECTION INTRAMUSCULAR; INTRAVENOUS ONCE
Status: COMPLETED | OUTPATIENT
Start: 2023-02-22 | End: 2023-02-22

## 2023-02-22 RX ORDER — BUMETANIDE 0.25 MG/ML
3 INJECTION INTRAMUSCULAR; INTRAVENOUS ONCE
Status: DISCONTINUED | OUTPATIENT
Start: 2023-02-22 | End: 2023-02-22

## 2023-02-22 RX ADMIN — LACTULOSE 20 G: 10 POWDER, FOR SOLUTION ORAL at 10:47

## 2023-02-22 RX ADMIN — SODIUM ZIRCONIUM CYCLOSILICATE 10 G: 10 POWDER, FOR SUSPENSION ORAL at 20:03

## 2023-02-22 RX ADMIN — OXYCODONE HYDROCHLORIDE 5 MG: 5 TABLET ORAL at 23:28

## 2023-02-22 RX ADMIN — PANTOPRAZOLE SODIUM 40 MG: 40 TABLET, DELAYED RELEASE ORAL at 20:02

## 2023-02-22 RX ADMIN — FOLIC ACID 1 MG: 1 TABLET ORAL at 10:48

## 2023-02-22 RX ADMIN — ONDANSETRON 4 MG: 4 TABLET, ORALLY DISINTEGRATING ORAL at 22:10

## 2023-02-22 RX ADMIN — SODIUM ZIRCONIUM CYCLOSILICATE 10 G: 10 POWDER, FOR SUSPENSION ORAL at 10:47

## 2023-02-22 RX ADMIN — RIFAXIMIN 550 MG: 550 TABLET ORAL at 20:02

## 2023-02-22 RX ADMIN — THERA TABS 1 TABLET: TAB at 10:47

## 2023-02-22 RX ADMIN — BUMETANIDE 2 MG: 0.25 INJECTION INTRAMUSCULAR; INTRAVENOUS at 10:47

## 2023-02-22 RX ADMIN — ATROPINE SULFATE 2 MG: 0.4 INJECTION, SOLUTION INTRAVENOUS at 09:00

## 2023-02-22 RX ADMIN — METOPROLOL TARTRATE 5 MG: 5 INJECTION INTRAVENOUS at 09:01

## 2023-02-22 RX ADMIN — DOBUTAMINE 10 MCG/KG/MIN: 12.5 INJECTION, SOLUTION, CONCENTRATE INTRAVENOUS at 08:42

## 2023-02-22 RX ADMIN — PANTOPRAZOLE SODIUM 40 MG: 40 TABLET, DELAYED RELEASE ORAL at 10:47

## 2023-02-22 RX ADMIN — HUMAN ALBUMIN MICROSPHERES AND PERFLUTREN 6 ML: 10; .22 INJECTION, SOLUTION INTRAVENOUS at 09:02

## 2023-02-22 RX ADMIN — FLUOXETINE 60 MG: 20 CAPSULE ORAL at 23:28

## 2023-02-22 RX ADMIN — LACTULOSE 20 G: 10 POWDER, FOR SOLUTION ORAL at 17:49

## 2023-02-22 RX ADMIN — THIAMINE HCL TAB 100 MG 100 MG: 100 TAB at 10:47

## 2023-02-22 RX ADMIN — BUMETANIDE 3 MG: 0.25 INJECTION INTRAMUSCULAR; INTRAVENOUS at 15:29

## 2023-02-22 RX ADMIN — RIFAXIMIN 550 MG: 550 TABLET ORAL at 10:47

## 2023-02-22 RX ADMIN — BUMETANIDE 1 MG: 0.25 INJECTION INTRAMUSCULAR; INTRAVENOUS at 05:32

## 2023-02-22 ASSESSMENT — ACTIVITIES OF DAILY LIVING (ADL)
ADLS_ACUITY_SCORE: 30
ADLS_ACUITY_SCORE: 31
ADLS_ACUITY_SCORE: 33
ADLS_ACUITY_SCORE: 33
ADLS_ACUITY_SCORE: 31
ADLS_ACUITY_SCORE: 34
ADLS_ACUITY_SCORE: 30
ADLS_ACUITY_SCORE: 33

## 2023-02-22 NOTE — PROGRESS NOTES
GASTROENTEROLOGY PROGRESS NOTE    Date: 02/22/2023     ASSESSMENT: 27 year old male with a medical history of Asperger's, T2DM, alcohol related cirrhosis c/b hepatic encephalopathy, history of esophageal varices bleeding (5/2022, 1/2023) and rectal varices s/p sclerotherapy 1/2023, ascites presenting with multiple episodes of hematemesis. Underwent EGD 2/11 showed oozing portal hypertensive gastropathy, and healed esophageal ulcer. Now ongoing liver transplant evaluation inpatient.    Decompensated cirrhosis with hepatic encephalopathy, h/o esophageal variceal bleed, HE, MELD-Na 34  Protein calories malnutrition  Follows with Dr. Wood. Patient is now listed for liver transplant as of 2/22/23. He will need to continue chemical dependency after transplant.    Portal hypertensive gastropathy bleeding, resolved  Rectal varices, s/p sclerotherapy 1/2023  EGD showed oozing portal hypertensive gastropathy. Starting on-selective b-blocker for PHG and also for  history of esophageal varices bleeding - however held after having ASHISH. No further bleeding and stable Hgb.     ASHISH, worsening. Initially likely from acute blood loss then now with diuretics use. U Na <20 suggested prerenal or HRS. However only small volume of ascites and never has low blood pressure would be against HRS. Is getting diuretics given volume overload requiring O2.    Hepatic encephalopathy  More somnolent for the past few days. On oxycodone for chronic leg pain.     Hypoxia, new. Likely from volume overload, no sign of pneumonia on CXR, or clinical fever. Has mild leukocytosis .    RECOMMENDATIONS:  - Patient is now listed for liver transplant  - ASHISH per nephrology, received albumin challenge, and also on diuretic per renal  - Please hold the oxycodone if patient is too somnolent, discussed with patient and RN bedside  - lactulose titrate to 3 BMs/day, and rifaximin  - 2 g low sodium diet; ensure high protein intake. PT/OT    Gastroenterology follow  up recommendations: Dr. Wood scheduled for 4/18/23      Discussed with Dr. Flynn.   Lita Vences MD  Gastroenterology/Hepatology Fellow      Physician Attestation   I saw this patient with the resident and agree with the resident/fellow's findings and plan of care as documented in the note.      Key findings:     28 yo M with ALD c/b PHG, variceal bleeding, HE, now with persistent transfusion requirement, ASHISH. Completed LT evaluation and listed for transplant    - TTE showing mildly elevated pressures but otherwise normal  - DSE normal - did not achieve target heart rate, but target heart rate notably high given his young age. No WMA or evidence of ischemia. Cardiology reviewed CT - no evidence of coronary calcifications  - Mild hypoxia today - getting bumex. No clinica s/s of pneumonia  - Patient has engaged in CD treatment when he has been out of the hospital, expectation is for him to continue this post transplant    60 MINUTES SPENT BY ME on the date of service doing chart review, history, exam, documentation & further activities per the note.    I have personally reviewed the following data over the past 24 hrs:    13.3 (H)  \   6.7 (LL)   / 110 (L)     129 (L) 89 (L) 84.0 (H) /  76   5.0 26 2.26 (H) \       ALT: 34 AST: 65 (H) AP: 85 TBILI: 10.4 (H)   ALB: 4.6 TOT PROTEIN: 6.7 LIPASE: N/A       Procal: N/A CRP: N/A Lactic Acid: 1.1       INR:  2.18 (H) PTT:  N/A   D-dimer:  N/A Fibrinogen:  N/A       Ferritin:  1,465 (H) % Retic:  4.6 (H) LDH:  285 (H)         Lillie Flynn MD  Date of Service (when I saw the patient): 02/22/23    _____________________________________________________    Subjective: No overt blood loss. Confused overnight, and still sleepy this morning.   Still having leg swelling. Required O2 but no cough or fever.    Objective:  Blood pressure 123/62, pulse 93, temperature 98.7  F (37.1  C), temperature source Axillary, resp. rate 20, weight 79.9 kg (176 lb 2.4 oz), SpO2 93  %.  Limited physical exam, patient was sleeping.  Gen:NAD  HEENT: jaundice  CV: RRR  Lungs: breathing on O2, on room air O2 down to 88%  Abd: not distended  LE: Swelling both legs  Skin: no jaundice  Neuro: non focal    LABS:  BMP  Recent Labs   Lab 02/22/23  1350 02/22/23  1140 02/22/23  0728 02/22/23  0610 02/22/23  0259 02/22/23  0003 02/21/23  2157 02/21/23  1800 02/21/23  1431 02/21/23  1319   NA  --   --   --  129*  --  127*  --  129*  --  128*   POTASSIUM  --   --   --  5.0  --  5.0  --  4.9  --  4.8   CHLORIDE  --   --   --  89*  --  87*  --  88*  --  88*   SARTHAK  --   --   --  9.5  --  9.8  --  9.8  --  9.9   CO2  --   --   --  26  --  23  --  25  --  25   BUN  --   --   --  84.0*  --  79.5*  --  75.3*  --  71.5*   CR  --   --   --  2.26*  --  2.29*  --  2.16*  --  1.94*   GLC 84 70 109* 115*   < > 105*   < > 97   < > 81    < > = values in this interval not displayed.     CBC  Recent Labs   Lab 02/22/23  1331 02/22/23  0610 02/21/23  0431 02/20/23  0408   WBC 13.3* 14.0* 13.0* 9.6   RBC 2.04* 2.09* 2.11* 1.95*   HGB 6.7* 7.0* 7.1* 6.6*   HCT 20.8* 21.3* 21.6* 20.3*   * 102* 102* 104*   MCH 32.8 33.5* 33.6* 33.8*   MCHC 32.2 32.9 32.9 32.5   RDW 21.2* 21.5* 22.8* 20.1*   * 114* 111* 106*     INR  Recent Labs   Lab 02/22/23  0610 02/21/23  0431 02/20/23  0408 02/19/23  0553   INR 2.18* 1.87* 1.68* 1.58*     LFTs  Recent Labs   Lab 02/22/23  1331 02/22/23  0610 02/21/23  0431 02/20/23  0408 02/19/23  0553   ALKPHOS  --  85 99 133* 161*   AST  --  65* 68* 67* 75*   ALT  --  34 37 42 54*   BILITOTAL 10.4* 10.3* 10.2* 6.3* 5.1*   PROTTOTAL  --  6.7 6.7 6.4 6.3*   ALBUMIN  --  4.6 4.5 4.2 3.7      PANC  No lab results found in last 7 days.MELD-Na score: 34 at 2/22/2023  1:31 PM  MELD score: 32 at 2/22/2023  1:31 PM  Calculated from:  Serum Creatinine: 2.26 mg/dL at 2/22/2023  6:10 AM  Serum Sodium: 129 mmol/L at 2/22/2023  6:10 AM  Total Bilirubin: 10.4 mg/dL at 2/22/2023  1:31 PM  INR(ratio): 2.18 at  2/22/2023  6:10 AM  Age: 27 years    IMAGING:  Reviewed.

## 2023-02-22 NOTE — PROGRESS NOTES
[facility]    Progress Note - Medicine Service, MAROON TEAM 2       Date of Admission:  2/11/2023    Assessment & Plan   Dillon Salter is a 27 year old male with history of Asperger's, alcoholic cirrhosis c/b esophageal varices and hepatic encephalopathy currently undergoing liver transplant evaluation at [facility], DM II who was directly admitted from [facility] for hematemesis, decompensated cirrhosis and hepatic encephalopathy. Hemodynamically stable with resolved encephalopathy. Course complicated by ASHISH.     Changes Today:  - expedited liver transplant eval per GI    > dobutamine stress test with cardiology completed today: WNL    > repeat peth level  - diurese with 2mg IV bumex today per nephrology recs, monitor BP and strict I/O's  - lokelma 10mg BID  - repeat UA  - iron studies & hemolysis labs given continued downtrend in Hgb  - holding oxycodone given ASHISH and increased somnolence      ASHISH non-oliguric, likely hepatorenal   Baseline Cr <1, acutely worsened 2/18 to 1.37 from 0.8, and rising. With worsening, trialed albumin 1g/kg x3 days and continue to hold diuresis. Cr continues to rise, to 2.2 as of 2/22.   - Strict I/O  - Hold coreg (per GI, though less likely due to BP)  - Trend CMP  - Avoid nephrotoxic agents   - s/p 500cc NS bolus 2/20  - s/p albumin challenge 1g/kg x3 days  - nephrology consult, appreciate recs    > IV bumex 1mg 2/21, 2 mg 2/22  - echo with 2.1cm diameter IVC  - repeat UA    Acute hypoxic respiratory failure 2/2 acute pulmonary edema  Overnight 2/21-2/22 AM patient noticed shortness of breath. CXR with opacities likely related to edema. Received 1g IV bumex overnight. Sats up to 97% on 2L NC.  - diuresis as above    Acute on chronic blood loss anemia- stable  Hematemesis- resolved  Hx of esophageal varices   Portal hypertensive gastropathy  Esophageal ulceration  Recurrent rectal varices  2-3 episodes hematemesis prior to ED of  ~400 mL, ~325 mL at ER, 250 mL anu hematemesis since arrival. HDS. Given 1 unit pRBCs on 2/12 for Hgb of 6.9. INR 1.48. Esophageal varices recently banded 1/23/23. 2 20 gauge IVs in place. EGD 2/12 AM: diffuse oozing likely 2/2 portal hypertensive gastropathy, no bleeding seen from recent banding, nonbleeding esophageal ulcers seen. No continued hematemesis. Transfused for Hgb 6.8 on 2/15 and for Hgb 6.6 on 2/20.  - trend hgb daily and transfuse for <7  - pantoprazole 40mg BID  - finished course of ceftriaxone 1g qday for total of 7d  - octreotide discontinued 2/13, restarted evening of 2/13 for presumed hematemesis, discontinued 2/14  - coreg 6.25mg BID, holding now for ASHISH  - GI following    > follow up with GI for a repeat EGD with possible RFA in 3 months    > will need CMP and CBC 1 week after discharge (GI to set up)  - iron studies and hemolysis labs for continued downtrend in Hgb    Hyperkalemia, recurrent  Moderate 6.1 2/19AM, in setting of ASHISH. Has been as high as 6.3 this admission previously thought to be related to potential absorption of blood from gastropathy, and/or insulin deficient state, now most likely elevated in the setting of ASHISH. Has received lokelma and kayexalate on several occassions, shifted with insulin multiple times. No associated EKG changes on multiple checks.   - Trend K   - Lokelma 10g BID   - nephrology consult, as above    Hepatic encephalopathy, improving  Decompensated alcohol related cirrhosis c/b esophageal varices   Portal HTN and splenomegaly  Coagulopathy due to liver disease  Thrombocytopenia due to liver disease, present on admission  Follows with Dr. Wood. MELD-Na 24 today  Etiology: Alcohol, diagnosed 4/2022  HE: lactulose BID, continue rifaximin  EV/GV: esophageal varices s/p banding 1/23/23, stable on EGD 2/12 without evidence of bleeding; on carvedilol 6.25mg BID, held 2/18 with ASHISH  Coagulopathy: INR 1.48,  (suspect 171 on admission was  hemoconcentration)  Ascites: None per RUQ 2/12, held PTA bumex 1 mg and spironolactone given ASHISH -> bumex 2/16 - 2/18 with ASHISH, resumed 2/21  SBP:  no h/o   HCC: last CT 1/2023, no evidence of  Liver transplant candidacy: Last drink 4/2022, follows with Dr. Wood. Ongoing liver transplant eval outpatient including Chemical dependency assessment and programming/Mental health referral, ongoing. CT angiogram, scheduled 4/2023. PFTs given history of tobacco use  - GI consulted, appreciate recs  - daily MELD labs  - NG placement for lactulose administration per [facility] protocol 2/12, NG tube removed 2/13 as patient's mental status improved and able to take meds PO  - <2g Na, <2L water, >1.5 g/kg/day protein diet  - Bcx NGTD  - expedited liver transplant eval per GI    > dobutamine stress test with cardiology    > repeat peth level    MELD-Na score: 34 at 2/22/2023  6:10 AM  MELD score: 32 at 2/22/2023  6:10 AM  Calculated from:  Serum Creatinine: 2.26 mg/dL at 2/22/2023  6:10 AM  Serum Sodium: 129 mmol/L at 2/22/2023  6:10 AM  Total Bilirubin: 10.3 mg/dL at 2/22/2023  6:10 AM  INR(ratio): 2.18 at 2/22/2023  6:10 AM  Age: 27 years    Hyperglycemia  DM II - hgb A1c 5.6%  - glargine increased to 28 units BID on 2/18 (home dose is 20 units BID, have been titrating here)  - high dose sliding scale insulin  - hypoglycemia protocol    Leukocytosis  Suspect reactive in setting of UGIB. No focal exam or history otherwise. HDS, has been afebrile.  - received ceftriaxone for UGIB for SBP ppx as above  - trend   - Bcx NGTD     Hypervolemic hyponatremia  Na 133 on admission, has been slowly trending down. Edema in feet and ankles noted on 2/16, slightly improved with elevation of legs.   - restarted bumex, but holding now given ASHISH  - holding spironolactone given hyperkalemia  - salt restriction    Lactic acidosis, resolved  Lactate 2.8 on admission. Likely 2/2 hypovolemia in the setting of acute GIB. Given 500 ml w/ K shifting on  2/12, then 1u pRBC  - trend, give additional fluids/blood as needed     CHRONIC & STABLE PROBLEMS  MDD/anxiety   Autism spectrum   Mother is his caretaker, lives with parents.   - continue PTA fluoxetine  - previously his mother, Leticia, has been able to stay with patient overnight which helps with mood/anxiety. This was discussed with charge RN    Chronic pain, musculoskeletal  - Continue PTA oxycodone 5mg BID PRN  - Gabapentin not a good option - previously caused excessive drowsiness  - Continue Tylenol, max 2 Gm daily  - Will discuss other agents    Malnutrition:   Level of malnutrition: Severe   - nutrition plan (see note from 2/14)  --> Discontinue clear liquid supplements  --> Add Ensure Plus (vanilla) at 2 PM and Special K protein bar once stephen, also at 2 PM per request       Diet: Snacks/Supplements Adult: Other; See comments below; With Meals  Diet  Combination Diet Clear Liquid; No Caffeine Diet    DVT Prophylaxis: Pneumatic compression device  Negron Catheter: Not present  Fluids: None  Lines: None     Cardiac Monitoring: ACTIVE order. Indication: Electrolyte Imbalance (24 hours)- Magnesium <1.3 mg/ml; Potassium < =2.8 or > 5.5 mg/ml  Code Status: Full Code         Disposition Plan      The patient's care was discussed with the Attending Physician, Dr. Kapadia.    Narcisa Willard, MS3  Medical Student  [facility] Service    Resident/Fellow Attestation   I, Radha Granado MD, was present with the medical/CHRISTA student who participated in the service and in the documentation of the note.  I have verified the history and personally performed the physical exam and medical decision making.  I agree with the assessment and plan of care as documented in the note.      Radha Granado MD  PGY2  Date of Service (when I saw the patient): 02/22/23   __________________________    Interval History   Nursing notes reviewed. Overnight had shortness of breath. Got CXR and bumex overnight, started on NC. This morning had  "dobutamine stress test, was sleeping afterward. Patient's mother says they are very tired from being up most of the night. No abdominal or chest pain. Still having a hard time \"getting a good deep breath\".     Physical Exam   Vital Signs: Temp: 98.2  F (36.8  C) Temp src: Oral BP: 114/63 Pulse: 96   Resp: 18 SpO2: 97 % O2 Device: Nasal cannula Oxygen Delivery: 2 LPM  Weight: 176 lbs 2.36 oz    General Appearance: Sleeping in bed, NAD  Respiratory: CTAB, no wheezing, no increased WOB on 2L NC  Cardiovascular: RRR, III/VI systolic murmur, extremities warm and well perfused  GI: Soft, nontender, no fluid wave  Skin: No rash, some bruising on the lower extremities, 2-3+ edema in lower extremities  Neuro: Oriented x3, no focal deficits. Tremor in bilateral upper extremities. No asterixis.    Medical Decision Making      Please see A&P for additional details of medical decision making.      Data   All labs and imaging reviewed   "

## 2023-02-22 NOTE — PROGRESS NOTES
Hours of Care: [id number]    Neuro: A&Ox4. Slightly lethargic beginning of shift. Cleared up and was more alert after taking night medications.   Cardiac: SR. VSS. Afebrile.   Respiratory: Sating > 92% on 1-3L NC. Patient stated he was having some increasing work of breathing and some SOB during shift, MD notified, see provider notification notes.   GI/: Adequate urine output. BM X3  Diet/appetite: Tolerating Reg 2g K+ diet. Poor appetite. PRN zofran provided x1 for nausea. Strict I&O's.  Activity: 1 to SBA up to bathroom with GB.   Pain: At acceptable level on current regimen. PRN oxycodone provided x1 for some generalized pain.    Skin: No new deficits noted.  LDA's: L PIV and R PIV both SL.     Plan: Will continue to monitor and follow plan of care. Notify primary team with changes.    Marilu Suazo RN on 2/22/2023 at 7:18 AM

## 2023-02-22 NOTE — PROGRESS NOTES
Pre-Liver Transplant Evaluation/Teaching    TEACHING TOPICS: Evaluation Process, Evaluation Items, Diagnostic Studies, Consultation, Chemical Dependency Policy, CD Eval, Donor Source, Liver Allocation, MELD System, UNOS, Waiting List, Follow up while on transplant list, Follow up after transplantation, Infection and Rejection, Immunosuppression , Medication Teaching, Lab Recording after transplant, Laboratory Frequency after transplant , Consent for evaluation and One year survival rates    INSTRUCTIONAL MATERIAL USED/GIVEN:   Liver Transplant Handbook, MELD Booklet, Donor Booklet, Web Sites Options, Verbal instructions, Multiple Listing Brochure , Consent for evaluation signed, One year survival rates and SRTR (Scientific Registry) Data    Person(s) involved in teaching: patient and mother  Asks Questions: YES  Eager to Learn: YES  Cooperative: YES  Receptive (willing/able to accept information): YES  Reason for the appointment, diagnosis and treatment plan: YES  Knowledge of proper use of medications and conditions for which they are ordered (with special attention to potential side effects or drug interactions): YES  Which situations necessitate calling provider and whom to contact: YES    Teaching Concerns Addressed   Comments: Big 5: MD compliance, med compliance, lab compliance, eating right & exercise, no alcohol or non-prescribed meds/drugs before or after transplant.   Nutritional needs and diet plan: YES  How and/when to access community resources: YES  Patient is aware of and agrees to required commitment to post-op care and long term follow-up: YES    Patient open to all Extended Criteria organ offers (underutilized donors): Yes or no: Yes  Details: open to all extended criteria     Patient has name and phone number of transplant coordinator.   Time Spent face-to-face teachin minutes.  Deni Hicks Jr., BSN, RN  Liver Transplant Coordinator  [phone]

## 2023-02-22 NOTE — LETTER
February 22, 2023    Dillon Salter  [address] [address]    Dear Mr. Salter,    This letter is sent to confirm that you have completed your transplant work-up and you are a candidate in the liver transplant program at the [facility].  You were placed on the liver active waitlist on 2/22/23.      When you are active on the waitlist and an organ becomes available, a coordinator will need to speak to you immediately.  You could be contacted at any time during the day or night as an organ could become available at any time.  Please make certain our office always has your current telephone numbers and address.      Items we will need from you:      We have received approval from your insurance company for the transplant procedure.  It is critical that you notify us if there is any change in your insurance.  It is also important that you familiarize yourself with the details of your specific insurance policy.  Our patient  is available to assist you if you should have any questions regarding your coverage.      Plan on getting your labs done at least every week once you are discharged from the hospital. Lab orders are in and your coordinator below can help.       During this waiting period, we may request additional periodic laboratory tests with your primary physician.  It will be your responsibility to remind your physician to forward your results to the Transplant Office.      We need to be kept informed of any changes in your medical condition such as:    o changes in your medications,   o significant changes in your health  o significant infections (such as pneumonia or abscesses)  o blood transfusions  o any condition which requires hospitalization  o any surgery      Remember to complete any routine cancer screening tests required before your transplant.  This includes colonoscopy; prostate screening for men, and mammogram  and gynecologic testing for women, as well as dental work.  Your primary care clinic can assist you with arranging for these exams.  Remind your caregivers to forward copies of the records and final reports.      We want you to know that our program has physician and surgeon coverage 24 hours a day, 365 days a year. In addition, our transplant surgeons and physicians will not be on call for two or more transplant programs more than 30 miles apart unless the circumstances have been reviewed and approved by the [employer] for Organ Sharing (UNOS) Membership and Professional Standards Committee (MPSC). Finally, our primary physician and primary surgeons are not designated as the primary surgeon or primary physician at more than 1 transplant hospital. If this coverage changes or there are substantial program changes, you will be notified in writing by letter.     Attached is a letter from UNOS that describes the services and information offered to patients by UNOS and the Organ Procurement and Transplantation Network (OPTN).    We appreciate having had the opportunity to participate in your care.  If you have questions, please feel free to call the Transplant Office at [phone] or [phone].    Sincerely,    Deni Hicks Jr., BSN, RN  Liver Transplant Coordinator  [phone]    Solid Organ Transplant  [facility]    Enclosures: OS Letter  cc: Care Team                          The Organ Procurement and Transplantation Network  Toll-free patient services line:     Your resource for organ transplant information    If you have a question regarding your own medical care, you always should call your transplant hospital first. However, for general organ transplant-related information, you can call the Organ Procurement and Transplantation Network (OPTN) toll-free patient services line at [phone].  Anyone, including potential transplant candidates, candidates, recipients, family members, friends, living donors, and donor family members, can call this number to:          Talk about organ donation, living donation, the transplant process, the donation process, and transplant policies.    Get a free patient information kit with helpful booklets, waiting list and transplant information, and a list of all transplant hospitals.    Ask questions about the OPTN website (https://optn.transplant.hrsa.gov/), the [employer] Network for Organ Sharing s (UNOS) website (https://unos.org/), or the UNOS website for living donors and transplant recipients. (https://www.transplantliving.org/).    Learn how the OPTN can help you.    Talk about any concerns that you may have with a transplant hospital.    The [facility] transplant system, the OPTN, is managed under federal contract by the [employer] for Organ Sharing (UNOS), which is a non-profit charitable organization. The OPTN helps create and define organ sharing policies that make the best use of donated organs. This process continuously evaluating new advances and discoveries so policies can be adapted to best serve patients waiting for transplants. To do so, the OPTN works closely with transplant professionals, transplant patients, transplant candidates, donor families, living donors, and the public. All transplant programs and organ procurement organizations throughout the country are OPTN members and are obligated to follow the policies the OPTN creates for allocating organs.    The OPTN also is responsible for:      Providing educational material for patients, the public, and professionals.    Raising awareness of the need for donated organs and tissue.    Coordinating organ procurement, matching, and placement.    Collecting information about every organ transplant and donation that occurs in the [place].    Remember, you should contact your transplant hospital  directly if you have questions or concerns about your own medical care including medical records, work-up progress, and test results.    We are not your transplant hospital, and our staff will not be able to answer questions about your case, so please keep your transplant hospital s phone number handy.    However, while you research your transplant needs and learn as much as you can about transplantation and donation, we welcome your call to our toll-free patient services line at [phone].          Updated 4/1/2019

## 2023-02-22 NOTE — PROVIDER NOTIFICATION
Time of notification: 8:50 PM  Provider notified: [facility]  Patient status: FYI: Patient said he's having some new SOB and pressure due to ascites. He's now sitting high up in bed, isn't desaturating; Wanted to pass along the info.

## 2023-02-22 NOTE — PROGRESS NOTES
Time of notification: 10:40 PM  Provider notified: [facility]  Patient status: Patient's BG is 108, with poor appetite and hasn't been able to eat much all day. Would you still want to give 28 units of glargine insulin? Also, patient's mom didn't want patient to receive more lactulose tonight, will try to educate again the importance of that medication.     Temp:  [97.9  F (36.6  C)-99.5  F (37.5  C)] 98.3  F (36.8  C)  Pulse:  [91-97] 95  Resp:  [16-20] 18  BP: (112-132)/(49-77) 128/62  SpO2:  [88 %-96 %] 96 %     Orders received:   Hold off on giving the glargine.      Shortly after provider notification, writer was able to provide patient their scheduled lactulose and lokelma after providing additional education.

## 2023-02-22 NOTE — PROGRESS NOTES
Pt here for dobutamine stress test. Test, meds and side effects reviewed with patient. Test stopped after administering maximum dose of medications: 50 mcg Dobutamine and a total of 2 mg IV atropine. Gave a total of 5 mg IV Metoprolol to bring HR back to baseline. Post monitoring complete and VSS.  Pt transferred back to  via transport.    Report given to HALLE To RN

## 2023-02-22 NOTE — PROGRESS NOTES
Nephrology Progress Note  02/21/2023         Assessment & Recommendations:   # ASHISH likely cardiorenal  # Hyperkalemia  # Hypochloremia  #Hypervolumia gaining weight, has not gotten diuretic since the 2/18, echo concerning for volume overload,Edema  # Cirrhosis  #Compensated Respiratory acidosis: PH of 7.38/54/32  Dillon Salter is a 27 year old male with history of Asperger's, alcoholic cirrhosis c/b esophageal varices and hepatic encephalopathy currently undergoing liver transplant evaluation at [facility], DM II who was directly admitted from [facility] for hematemesis, decompensated cirrhosis and hepatic encephalopathy.  Nephrology consulted for ASHISH and hyperkalemia.      Patient initially admitted for hematemesis and now with worsening creatinine. Creatinine elevated to 1.68 on initial consult evaluation, baseline creatinine 0.7 - 0.8. today the creatinine is 1.9 On initial exam, pt has moist mucous membranes, no ascites, clear lungs and 2-3+ BLE pitting edema.         Recommendations:  - IV bumex 1 mg with the goal of net negative 1 liter or less  -Accurate I/O's  -Daily standing weight  - Continue Lokelma for now  - Monitor BMP   Recommendations were communicated to primary team via verbally    Seen and discussed with Dr. Kesha Medina MD   Division of Renal Disease and Hypertension  [facility]  [employer]  Vocera Web Console    Interval History :   Nursing and provider notes from last 24 hours reviewed.  In the last 24 hours Dillon Slater is more awake after having 3 bowel movement which probably improve his confusion.  Review of Systems:   I reviewed the following systems:  GI: decreased appetite. No nausea or vomiting or diarrhea.   Neuro:  No confusion  Constitutional:  No fever or chills  CV: positive dyspnea or edema.  No chest pain.    Physical Exam:   I/O last 3 completed shifts:  In: 600 [P.O.:600]  Out: 550 [Urine:550]   /62 (BP Location: Right arm)   Pulse 95   Temp 98.3  F  (36.8  C) (Oral)   Resp 18   Wt 80 kg (176 lb 5.9 oz)   SpO2 96%   BMI 29.35 kg/m       GENERAL APPEARANCE: Patient is not in distress  EYES:  Positive scleral icterus, pupils equal  PULM: lungs clear to auscultation bilaterally, equal air movement, no clubbing  CV: Patient has regular rhythm, normal rate, no rub     -JVD -ve     -edema +3  GI: soft, non tender, non distended, bowel sounds are present  INTEGUMENT: no cyanosis, no rash  NEURO:  Present asterixis   Access none    Labs:   All labs reviewed by me  Electrolytes/Renal - Recent Labs   Lab Test 02/21/23  1800 02/21/23  1719 02/21/23  1431 02/21/23  1319 02/21/23  0533 02/21/23  0431 02/12/23  0839 02/12/23  0811 01/28/23  0602 01/28/23  0541 01/27/23  0802 01/27/23  0347   *  --   --  128*  --  126*   < >  --    < > 129*  --  131*   POTASSIUM 4.9  --   --  4.8  --  5.4*   < > 6.3*   < > 4.9  --  3.9   CHLORIDE 88*  --   --  88*  --  87*   < >  --    < > 97*  --  98   CO2 25  --   --  25  --  25   < >  --    < > 24  --  25   BUN 75.3*  --   --  71.5*  --  69.1*   < >  --    < > 19.7  --  11.7   CR 2.16*  --   --  1.94*  --  1.79*   < >  --    < > 0.89  --  0.65*   GLC 97 112* 137* 81   < > 108*   < >  --    < > 266*   < > 141*   SARTHAK 9.8  --   --  9.9  --  9.6   < >  --    < > 8.0*  --  8.1*   MAG  --   --   --   --   --   --   --  1.6*  --  2.0  --  1.6*   PHOS  --   --   --   --   --   --   --  4.6*  --  2.5  --  2.3*    < > = values in this interval not displayed.       CBC -   Recent Labs   Lab Test 02/21/23  0431 02/20/23  0408 02/19/23  0553   WBC 13.0* 9.6 11.7*   HGB 7.1* 6.6* 7.1*   * 106* 127*       LFTs -   Recent Labs   Lab Test 02/21/23  0431 02/20/23  0408 02/19/23  0553   ALKPHOS 99 133* 161*   BILITOTAL 10.2* 6.3* 5.1*   ALT 37 42 54*   AST 68* 67* 75*   PROTTOTAL 6.7 6.4 6.3*   ALBUMIN 4.5 4.2 3.7       Iron Panel -   Recent Labs   Lab Test 02/20/23  0730 02/16/23  0543 12/20/22  0703 10/06/22  0958 04/07/22  0624   IRON  --    --  249*  --  85   ASCENCION 1,335*   < > 2,207*   < > 423*    < > = values in this interval not displayed.         Imaging:  All imaging studies reviewed by me.     Current Medications:    [Held by provider] bumetanide  1 mg Oral Daily     [Held by provider] carvedilol  6.25 mg Oral BID     FLUoxetine  60 mg Oral At Bedtime     folic acid  1 mg Oral Daily     insulin aspart   Subcutaneous Daily with breakfast     insulin aspart   Subcutaneous Daily with lunch     insulin aspart   Subcutaneous Daily with supper     insulin aspart  1-10 Units Subcutaneous TID AC     insulin aspart  1-7 Units Subcutaneous At Bedtime     insulin glargine  28 Units Subcutaneous BID     lactulose  20 g Oral TID     multivitamin, therapeutic  1 tablet Per Feeding Tube Daily     pantoprazole  40 mg Oral BID     rifaximin  550 mg Oral BID     sodium chloride (PF)  3 mL Intracatheter Q8H     sodium zirconium cyclosilicate  10 g Oral BID     [Held by provider] spironolactone  100 mg Oral At Bedtime     thiamine  100 mg Oral Daily       - MEDICATION INSTRUCTIONS -       dextrose       José Antonio Medina MD    I was present with the fellow during the history and exam.  I discussed the case with the fellow and agree with the findings as documented in the assessment and plan.  Gentle diuresis given volume overload.   Khushbu Cabello MD   of Medicine  Department of Nephrology  [facility]

## 2023-02-22 NOTE — PROVIDER NOTIFICATION
Time of notification: 3:31 AM  Provider notified: Sofya 2   Patient status: Patient is stating he's still having SOB, feeling like he's struggling to catch his breath, & feeling like he has an increased work of breathing. Currently on 3L NC SpO2 at 94% & HOB up high.       Orders received: Provider came bedside to assess patient and ordered a stat xray.     Will continue to monitor and follow plan of care.

## 2023-02-22 NOTE — PROVIDER NOTIFICATION
-------------------CRITICAL LAB VALUE-------------------    Critical lab result: hgb 6.7  Time of notification from lab: 1428  Time lab value reported to provider: 7142  Patient status:  Temp:  [97.9  F (36.6  C)-98.9  F (37.2  C)] 98.7  F (37.1  C)  Pulse:  [] 93  Resp:  [16-20] 20  BP: (114-134)/(62-77) 123/62  SpO2:  [91 %-97 %] 93 %  Orders received:   1 unit of blood ordered. Will cont to follow POC and notify team with any changes.

## 2023-02-22 NOTE — PROGRESS NOTES
Brief Medicine Update Note:    Poor UOP response after 2 mg IV Bumex given at 1045 am: only ~ ml per RN  Discussed with nephrology fellow:  -- Give 3 mg IV Bumex x1 now  -- Continue strict I/O's  -- Goal at least  ml/hr UOP after Bumex dose, would consider additional Bumex dose of 2 mg IV if not meeting this goal per nephrology  -- Planning for repeat BMP at 1800    Hgb down to 6.8, giving 1u pRBC  --- Repeat Hgb in the AM unless evidence of bleeding overnight    Radha Granado  PGY-2 N Internal Medicine  Maroon 2, see signed in provider for pager

## 2023-02-22 NOTE — PROGRESS NOTES
Listed for liver transplant  etoh related  MELD 34   ABO A    Open for extended criteria    Currently admitted with us

## 2023-02-22 NOTE — PROGRESS NOTES
Nephrology Progress Note  02/22/2023         Assessment & Recommendations:   # ASHISH likely cardiorenal  # Hyperkalemia  # Hypochloremia  #Hypervolumia gaining weight, has not gotten diuretic since the 2/18, echo concerning for volume overload,Edema  # Cirrhosis  #Compensated Respiratory acidosis: PH of 7.38/54/32  Dillon Salter is a 27 year old male with history of Asperger's, alcoholic cirrhosis c/b esophageal varices and hepatic encephalopathy currently undergoing liver transplant evaluation at [facility], DM II who was directly admitted from [facility] for hematemesis, decompensated cirrhosis and hepatic encephalopathy.  Nephrology consulted for ASHISH and hyperkalemia.      Patient initially admitted for hematemesis and now with worsening creatinine. Creatinine elevated to 1.68 on initial consult evaluation, baseline creatinine 0.7 - 0.8. On initial exam, pt has moist mucous membranes, no ascites, clear lungs and 2-3+ BLE pitting edema. Creatinine continues to rise.         Recommendations:  - Continue IV bumex 1 mg with the goal of net negative 1 liter or less. Goal for him to be net negative.   - Accurate I/O's  -Daily standing weight  - Continue Lokelma for now  - Monitor BMP   Recommendations were communicated to primary team via text page    Seen and discussed with Dr. Irineo Posada MD   Division of Renal Disease and Hypertension  [facility]  [employer]  Vocera Web Console    Interval History :   Nursing and provider notes from last 24 hours reviewed.  In the last 24 hours Dillon Salter had more SOB overnight. He states he is breathing okay right now.  He does feel ill today in general.  He is happy to hear that he will be listed for liver transplant.  Denies fevers, chills, chest pain, SOB, abdominal pain, N/V.    Review of Systems:   I reviewed the following systems:  GI: decreased appetite. No nausea or vomiting or diarrhea.   Neuro:  No confusion  Constitutional:  No fever or chills  CV:  positive dyspnea or edema.  No chest pain.    Physical Exam:   I/O last 3 completed shifts:  In: 720 [P.O.:720]  Out: 525 [Urine:525]   /71 (BP Location: Right arm)   Pulse 102   Temp 98.6  F (37  C) (Oral)   Resp 16   Wt 79.9 kg (176 lb 2.4 oz)   SpO2 96%   BMI 29.31 kg/m       GENERAL APPEARANCE: Patient is not in distress  EYES:  Positive scleral icterus, pupils equal  PULM: Crackles in bilateral lower lung bases, equal air movement, no clubbing  CV: Patient has regular rhythm, normal rate, no rub     -JVD -ve     -edema +3  GI: soft, non tender, non distended, bowel sounds are present  INTEGUMENT: no cyanosis, no rash  NEURO:  AOx3, grossly nonfocal, moves all extremities spontaneously  Access none    Labs:   All labs reviewed by me  Electrolytes/Renal -   Recent Labs   Lab Test 02/22/23  0728 02/22/23  0610 02/22/23  0259 02/22/23  0003 02/21/23  2157 02/21/23  1800 02/12/23  0839 02/12/23  0811 01/28/23  0602 01/28/23  0541 01/27/23  0802 01/27/23  0347   NA  --  129*  --  127*  --  129*   < >  --    < > 129*  --  131*   POTASSIUM  --  5.0  --  5.0  --  4.9   < > 6.3*   < > 4.9  --  3.9   CHLORIDE  --  89*  --  87*  --  88*   < >  --    < > 97*  --  98   CO2  --  26  --  23  --  25   < >  --    < > 24  --  25   BUN  --  84.0*  --  79.5*  --  75.3*   < >  --    < > 19.7  --  11.7   CR  --  2.26*  --  2.29*  --  2.16*   < >  --    < > 0.89  --  0.65*   * 115* 120* 105*   < > 97   < >  --    < > 266*   < > 141*   SARTHAK  --  9.5  --  9.8  --  9.8   < >  --    < > 8.0*  --  8.1*   MAG  --   --   --   --   --   --   --  1.6*  --  2.0  --  1.6*   PHOS  --   --   --   --   --   --   --  4.6*  --  2.5  --  2.3*    < > = values in this interval not displayed.       CBC -   Recent Labs   Lab Test 02/22/23  0610 02/21/23  0431 02/20/23  0408   WBC 14.0* 13.0* 9.6   HGB 7.0* 7.1* 6.6*   * 111* 106*       LFTs -   Recent Labs   Lab Test 02/22/23  0610 02/21/23  0431 02/20/23  0408   ALKPHOS 85 99  133*   BILITOTAL 10.3* 10.2* 6.3*   ALT 34 37 42   AST 65* 68* 67*   PROTTOTAL 6.7 6.7 6.4   ALBUMIN 4.6 4.5 4.2       Iron Panel -   Recent Labs   Lab Test 02/20/23  0730 02/16/23  0543 12/20/22  0703 10/06/22  0958 04/07/22  0624   IRON  --   --  249*  --  85   ASCENCION 1,335*   < > 2,207*   < > 423*    < > = values in this interval not displayed.         Imaging:  All imaging studies reviewed by me.     Current Medications:    [Held by provider] bumetanide  1 mg Oral Daily     [Held by provider] carvedilol  6.25 mg Oral BID     FLUoxetine  60 mg Oral At Bedtime     folic acid  1 mg Oral Daily     insulin aspart   Subcutaneous Daily with breakfast     insulin aspart   Subcutaneous Daily with lunch     insulin aspart   Subcutaneous Daily with supper     insulin aspart  1-10 Units Subcutaneous TID AC     insulin aspart  1-7 Units Subcutaneous At Bedtime     insulin glargine  15 Units Subcutaneous BID     lactulose  20 g Oral BID     multivitamin, therapeutic  1 tablet Per Feeding Tube Daily     pantoprazole  40 mg Oral BID     perflutren diluted 1mL to 2mL with saline  9 mL Intravenous Once     rifaximin  550 mg Oral BID     sodium chloride (PF)  3 mL Intracatheter Q8H     sodium zirconium cyclosilicate  10 g Oral BID     [Held by provider] spironolactone  100 mg Oral At Bedtime     thiamine  100 mg Oral Daily       - MEDICATION INSTRUCTIONS -       dextrose       DOBUTamine Stopped (02/22/23 0856)     sodium chloride       I was present with the resident during the history and exam.  I discussed the case with the resident and agree with the findings as documented in the assessment and plan.    Khushbu Cabello MD   of Medicine  Department of Nephrology  [facility]

## 2023-02-23 ENCOUNTER — APPOINTMENT (OUTPATIENT)
Dept: OCCUPATIONAL THERAPY | Facility: CLINIC | Age: 28
DRG: 005 | End: 2023-02-23
Payer: COMMERCIAL

## 2023-02-23 ENCOUNTER — APPOINTMENT (OUTPATIENT)
Dept: OCCUPATIONAL THERAPY | Facility: CLINIC | Age: 28
DRG: 005 | End: 2023-02-23
Attending: STUDENT IN AN ORGANIZED HEALTH CARE EDUCATION/TRAINING PROGRAM
Payer: COMMERCIAL

## 2023-02-23 ENCOUNTER — ORGAN (OUTPATIENT)
Dept: TRANSPLANT | Facility: CLINIC | Age: 28
End: 2023-02-23
Payer: COMMERCIAL

## 2023-02-23 LAB
ALBUMIN SERPL BCG-MCNC: 4.5 G/DL (ref 3.5–5.2)
ALP SERPL-CCNC: 101 U/L (ref 40–129)
ALT SERPL W P-5'-P-CCNC: 34 U/L (ref 10–50)
ANION GAP SERPL CALCULATED.3IONS-SCNC: 14 MMOL/L (ref 7–15)
ANION GAP SERPL CALCULATED.3IONS-SCNC: 14 MMOL/L (ref 7–15)
AST SERPL W P-5'-P-CCNC: 57 U/L (ref 10–50)
BILIRUB SERPL-MCNC: 10.2 MG/DL
BUN SERPL-MCNC: 88.1 MG/DL (ref 6–20)
BUN SERPL-MCNC: 90.1 MG/DL (ref 6–20)
CALCIUM SERPL-MCNC: 9.4 MG/DL (ref 8.6–10)
CALCIUM SERPL-MCNC: 9.6 MG/DL (ref 8.6–10)
CHLORIDE SERPL-SCNC: 88 MMOL/L (ref 98–107)
CHLORIDE SERPL-SCNC: 89 MMOL/L (ref 98–107)
CREAT SERPL-MCNC: 1.88 MG/DL (ref 0.67–1.17)
CREAT SERPL-MCNC: 2.05 MG/DL (ref 0.67–1.17)
DEPRECATED HCO3 PLAS-SCNC: 25 MMOL/L (ref 22–29)
DEPRECATED HCO3 PLAS-SCNC: 28 MMOL/L (ref 22–29)
ERYTHROCYTE [DISTWIDTH] IN BLOOD BY AUTOMATED COUNT: 22.5 % (ref 10–15)
GFR SERPL CREATININE-BSD FRML MDRD: 44 ML/MIN/1.73M2
GFR SERPL CREATININE-BSD FRML MDRD: 49 ML/MIN/1.73M2
GLUCOSE BLDC GLUCOMTR-MCNC: 165 MG/DL (ref 70–99)
GLUCOSE BLDC GLUCOMTR-MCNC: 173 MG/DL (ref 70–99)
GLUCOSE BLDC GLUCOMTR-MCNC: 211 MG/DL (ref 70–99)
GLUCOSE BLDC GLUCOMTR-MCNC: 214 MG/DL (ref 70–99)
GLUCOSE BLDC GLUCOMTR-MCNC: 226 MG/DL (ref 70–99)
GLUCOSE BLDC GLUCOMTR-MCNC: 235 MG/DL (ref 70–99)
GLUCOSE BLDC GLUCOMTR-MCNC: 245 MG/DL (ref 70–99)
GLUCOSE BLDC GLUCOMTR-MCNC: 257 MG/DL (ref 70–99)
GLUCOSE SERPL-MCNC: 189 MG/DL (ref 70–99)
GLUCOSE SERPL-MCNC: 196 MG/DL (ref 70–99)
HAPTOGLOB SERPL-MCNC: 4 MG/DL (ref 32–197)
HCT VFR BLD AUTO: 24.2 % (ref 40–53)
HGB BLD-MCNC: 8.1 G/DL (ref 13.3–17.7)
INR PPP: 1.8 (ref 0.85–1.15)
LACTATE SERPL-SCNC: 1.1 MMOL/L (ref 0.7–2)
MCH RBC QN AUTO: 32.7 PG (ref 26.5–33)
MCHC RBC AUTO-ENTMCNC: 33.5 G/DL (ref 31.5–36.5)
MCV RBC AUTO: 98 FL (ref 78–100)
PATH REPORT.COMMENTS IMP SPEC: NORMAL
PATH REPORT.COMMENTS IMP SPEC: NORMAL
PATH REPORT.FINAL DX SPEC: NORMAL
PATH REPORT.MICROSCOPIC SPEC OTHER STN: NORMAL
PATH REPORT.MICROSCOPIC SPEC OTHER STN: NORMAL
PATH REPORT.RELEVANT HX SPEC: NORMAL
PLATELET # BLD AUTO: 133 10E3/UL (ref 150–450)
POTASSIUM SERPL-SCNC: 4 MMOL/L (ref 3.4–5.3)
POTASSIUM SERPL-SCNC: 4.3 MMOL/L (ref 3.4–5.3)
PROT SERPL-MCNC: 7.1 G/DL (ref 6.4–8.3)
RBC # BLD AUTO: 2.48 10E6/UL (ref 4.4–5.9)
SODIUM SERPL-SCNC: 127 MMOL/L (ref 136–145)
SODIUM SERPL-SCNC: 131 MMOL/L (ref 136–145)
WBC # BLD AUTO: 12.2 10E3/UL (ref 4–11)

## 2023-02-23 PROCEDURE — 120N000003 HC R&B IMCU UMMC

## 2023-02-23 PROCEDURE — 250N000013 HC RX MED GY IP 250 OP 250 PS 637: Performed by: STUDENT IN AN ORGANIZED HEALTH CARE EDUCATION/TRAINING PROGRAM

## 2023-02-23 PROCEDURE — 250N000011 HC RX IP 250 OP 636

## 2023-02-23 PROCEDURE — 99233 SBSQ HOSP IP/OBS HIGH 50: CPT | Performed by: INTERNAL MEDICINE

## 2023-02-23 PROCEDURE — 36415 COLL VENOUS BLD VENIPUNCTURE: CPT

## 2023-02-23 PROCEDURE — 85027 COMPLETE CBC AUTOMATED: CPT

## 2023-02-23 PROCEDURE — 80053 COMPREHEN METABOLIC PANEL: CPT

## 2023-02-23 PROCEDURE — 250N000013 HC RX MED GY IP 250 OP 250 PS 637

## 2023-02-23 PROCEDURE — 83605 ASSAY OF LACTIC ACID: CPT | Performed by: INTERNAL MEDICINE

## 2023-02-23 PROCEDURE — 97110 THERAPEUTIC EXERCISES: CPT | Mod: GO

## 2023-02-23 PROCEDURE — 97535 SELF CARE MNGMENT TRAINING: CPT | Mod: GO

## 2023-02-23 PROCEDURE — 85610 PROTHROMBIN TIME: CPT

## 2023-02-23 PROCEDURE — 99232 SBSQ HOSP IP/OBS MODERATE 35: CPT | Mod: GC | Performed by: INTERNAL MEDICINE

## 2023-02-23 PROCEDURE — 99233 SBSQ HOSP IP/OBS HIGH 50: CPT | Mod: GC | Performed by: INTERNAL MEDICINE

## 2023-02-23 PROCEDURE — 97140 MANUAL THERAPY 1/> REGIONS: CPT | Mod: GO | Performed by: OCCUPATIONAL THERAPIST

## 2023-02-23 PROCEDURE — 36415 COLL VENOUS BLD VENIPUNCTURE: CPT | Performed by: INTERNAL MEDICINE

## 2023-02-23 RX ORDER — BUMETANIDE 0.25 MG/ML
3 INJECTION INTRAMUSCULAR; INTRAVENOUS ONCE
Status: DISCONTINUED | OUTPATIENT
Start: 2023-02-23 | End: 2023-02-23

## 2023-02-23 RX ORDER — BUMETANIDE 0.25 MG/ML
3 INJECTION INTRAMUSCULAR; INTRAVENOUS ONCE
Status: COMPLETED | OUTPATIENT
Start: 2023-02-23 | End: 2023-02-23

## 2023-02-23 RX ORDER — OXYCODONE HYDROCHLORIDE 5 MG/1
5 TABLET ORAL 2 TIMES DAILY PRN
Status: DISCONTINUED | OUTPATIENT
Start: 2023-02-23 | End: 2023-02-28

## 2023-02-23 RX ADMIN — PANTOPRAZOLE SODIUM 40 MG: 40 TABLET, DELAYED RELEASE ORAL at 20:40

## 2023-02-23 RX ADMIN — FOLIC ACID 1 MG: 1 TABLET ORAL at 08:21

## 2023-02-23 RX ADMIN — FLUOXETINE 60 MG: 20 CAPSULE ORAL at 22:34

## 2023-02-23 RX ADMIN — LACTULOSE 20 G: 10 POWDER, FOR SOLUTION ORAL at 08:21

## 2023-02-23 RX ADMIN — INSULIN ASPART 7 UNITS: 100 INJECTION, SOLUTION INTRAVENOUS; SUBCUTANEOUS at 17:39

## 2023-02-23 RX ADMIN — OXYCODONE HYDROCHLORIDE 5 MG: 5 TABLET ORAL at 12:27

## 2023-02-23 RX ADMIN — BUMETANIDE 3 MG: 0.25 INJECTION INTRAMUSCULAR; INTRAVENOUS at 10:29

## 2023-02-23 RX ADMIN — SODIUM ZIRCONIUM CYCLOSILICATE 10 G: 10 POWDER, FOR SUSPENSION ORAL at 20:40

## 2023-02-23 RX ADMIN — THIAMINE HCL TAB 100 MG 100 MG: 100 TAB at 08:21

## 2023-02-23 RX ADMIN — PANTOPRAZOLE SODIUM 40 MG: 40 TABLET, DELAYED RELEASE ORAL at 08:20

## 2023-02-23 RX ADMIN — BUMETANIDE 3 MG: 0.25 INJECTION INTRAMUSCULAR; INTRAVENOUS at 17:21

## 2023-02-23 RX ADMIN — OXYCODONE HYDROCHLORIDE 5 MG: 5 TABLET ORAL at 22:45

## 2023-02-23 RX ADMIN — SODIUM ZIRCONIUM CYCLOSILICATE 10 G: 10 POWDER, FOR SUSPENSION ORAL at 08:21

## 2023-02-23 RX ADMIN — RIFAXIMIN 550 MG: 550 TABLET ORAL at 08:20

## 2023-02-23 RX ADMIN — RIFAXIMIN 550 MG: 550 TABLET ORAL at 20:40

## 2023-02-23 RX ADMIN — LACTULOSE 20 G: 10 POWDER, FOR SOLUTION ORAL at 20:40

## 2023-02-23 RX ADMIN — THERA TABS 1 TABLET: TAB at 08:20

## 2023-02-23 RX ADMIN — OXYCODONE HYDROCHLORIDE 2.5 MG: 5 TABLET ORAL at 12:19

## 2023-02-23 ASSESSMENT — ACTIVITIES OF DAILY LIVING (ADL)
ADLS_ACUITY_SCORE: 32
ADLS_ACUITY_SCORE: 35
ADLS_ACUITY_SCORE: 34
ADLS_ACUITY_SCORE: 33
ADLS_ACUITY_SCORE: 35
ADLS_ACUITY_SCORE: 34
ADLS_ACUITY_SCORE: 32
ADLS_ACUITY_SCORE: 35
ADLS_ACUITY_SCORE: 34
ADLS_ACUITY_SCORE: 35
ADLS_ACUITY_SCORE: 35
ADLS_ACUITY_SCORE: 32

## 2023-02-23 NOTE — PLAN OF CARE
Goal Outcome Evaluation:    Neuro: pt lethargic most of day. Mother and patient stating the tiredness is d/t not sleeping well the last two nights. Pt answers orientation questions appropriately. This evening patient seemed more lethargic; PRN lactulose given x1 for [place] score of 1.   Cardiac: SR. VSS.   Respiratory: Sating 90-91% on 1L NC. Pt abdominal breathing, but denies SOB at rest. GONZALEZ w/ activity.   GI/: incontinence episodes x2. Educated patient on importance of knowing how much he is putting out d/t receving diuretics. BM X1.  Diet/appetite: Tolerating 2g K+, consistent carb, low sodium diet. Poor appetite. Carb coverage. Bg checks WNL, no insulin needed.   Activity:  Assist of 1 w/ gaitbelt and walker, up to chair and in halls.  Pain: At acceptable level on current regimen.   Skin: No new deficits noted.  LDA's: right and left PIV. Right piv currently infusing blood.     Plan: dobutamine stress echo completed this am. UA sent to lab. hgb re check and BMP rescheduled for 2000 d/t blood running. Pt now listed for liver transplant. Pt to be NPO at 0000. Continue with POC. Notify primary team with changes.

## 2023-02-23 NOTE — PROGRESS NOTES
[facility]    Progress Note - Medicine Service, MAROON TEAM 2       Date of Admission:  2/11/2023    Assessment & Plan   Dillon Salter is a 27 year old male with history of Asperger's, alcoholic cirrhosis c/b esophageal varices and hepatic encephalopathy currently undergoing liver transplant evaluation at [facility], DM II who was directly admitted from [facility] for hematemesis, decompensated cirrhosis and hepatic encephalopathy. Hemodynamically stable with resolved encephalopathy. Course complicated by ASHISH.     Changes Today:  - Now listed for liver transplant eval per GI  - 3 mg IV bumex given: 400 ml output per RN  - Repeat 3 mg IV Bumex this afternoon, monitor BP and strict I/O's  - Continuing Lokelma 10mg BID per nephrology  - Holding glargine given mild hypoglycemia, eating less - monitoring sugars  - Increased pain per pt, resumed oxycodone w/ cautious monitoring        ASHISH non-oliguric, likely hepatorenal   Baseline Cr <1, acutely worsened 2/18 to 1.37 from 0.8, and rising. With worsening, trialed albumin 1g/kg x3 days and continue to hold diuresis. Cr continues to rise, to 2.2 as of 2/22.   - Strict I/O  - Hold coreg (per GI, though less likely due to BP)  - Trend CMP  - Avoid nephrotoxic agents   - s/p 500cc NS bolus 2/20  - s/p albumin challenge 1g/kg x3 days  - Nephrology following, appreciate recs    > IV bumex 1mg 2/21, 2 mg 2/22, 3 mg 2/22 - improved output  - repeat diuresis with 3 mg IV Bumex x2 today, goal net negative 1L  - repeat UA 2/22: bland    Acute hypoxic respiratory failure 2/2 acute pulmonary edema  Overnight 2/21-2/22 AM patient noticed shortness of breath. CXR with opacities likely related to edema. Received 1g IV bumex overnight. Sats up to 97% on 2L NC.  - diuresis as above    Acute on chronic blood loss anemia- stable  Hematemesis- resolved  Hx of esophageal varices   Portal hypertensive gastropathy  Esophageal  ulceration  Recurrent rectal varices  2-3 episodes hematemesis prior to ED of ~400 mL, ~325 mL at ER, 250 mL anu hematemesis since arrival. HDS. Given 1 unit pRBCs on 2/12 for Hgb of 6.9. INR 1.48. Esophageal varices recently banded 1/23/23. 2 20 gauge IVs in place. EGD 2/12 AM: diffuse oozing likely 2/2 portal hypertensive gastropathy, no bleeding seen from recent banding, nonbleeding esophageal ulcers seen. No continued hematemesis. Transfused for Hgb 6.8 on 2/15 and for Hgb 6.6 on 2/20.  - trend hgb daily and transfuse for <7  - pantoprazole 40mg PO BID  - finished course of ceftriaxone 1g qday for total of 7d  - octreotide discontinued 2/13, restarted evening of 2/13 for presumed hematemesis, discontinued 2/14  - coreg 6.25mg BID, holding now given ASHISH  - GI following    > follow up with GI for a repeat EGD with possible RFA in 3 months    > will need CMP and CBC 1 week after discharge (GI to set up)  - iron studies and hemolysis labs for continued downtrend in Hgb    Hyperkalemia, resolved  Moderate 6.1 2/19AM, in setting of ASHISH. Has been as high as 6.3 this admission previously thought to be related to potential absorption of blood from gastropathy, and/or insulin deficient state, now most likely elevated in the setting of ASHISH. Has received lokelma and kayexalate on several occassions, shifted with insulin multiple times. No associated EKG changes on multiple checks.   - Trend K   - Lokelma 10g BID   - nephrology following, as above    Hepatic encephalopathy, resolved  Decompensated alcohol related cirrhosis c/b esophageal varices   Portal HTN and splenomegaly  Coagulopathy due to liver disease  Thrombocytopenia due to liver disease, present on admission  Follows with Dr. Wood. MELD-Na 24 today  Etiology: Alcohol, diagnosed 4/2022  HE: lactulose BID, continue rifaximin  EV/GV: esophageal varices s/p banding 1/23/23, stable on EGD 2/12 without evidence of bleeding; on carvedilol 6.25mg BID, held 2/18 with  ASHISH  Coagulopathy: INR 1.48,  (suspect 171 on admission was hemoconcentration)  Ascites: None per RUQ 2/12, held PTA bumex 1 mg and spironolactone given ASHISH -> bumex 2/16 - 2/18 with ASHISH, resumed 2/21  SBP:  no h/o   HCC: last CT 1/2023, no evidence of  Liver transplant candidacy: Last drink 4/2022, follows with Dr. Wood. Ongoing liver transplant eval outpatient including Chemical dependency assessment and programming/Mental health referral, ongoing. CT angiogram, scheduled 4/2023. PFTs given history of tobacco use  - GI consulted, appreciate recs  - daily MELD labs  - [facility], NG tube removed 2/13 as patient's mental status improved and able to take meds PO  - <2g Na, <2L water, >1.5 g/kg/day protein diet  - Bcx NGTD  - expedited liver transplant eval per GI    > dobutamine stress test 2/22 WNL    > repeat peth level negative    MELD-Na score: 33 at 2/22/2023  8:03 PM  MELD score: 31 at 2/22/2023  8:03 PM  Calculated from:  Serum Creatinine: 2.10 mg/dL at 2/22/2023  8:03 PM  Serum Sodium: 131 mmol/L at 2/22/2023  8:03 PM  Total Bilirubin: 10.4 mg/dL at 2/22/2023  1:31 PM  INR(ratio): 2.18 at 2/22/2023  6:10 AM  Age: 27 years    Hypoglycemia  Hyperglycemia  DM II - hgb A1c 5.6%  Glargine increased to 28 units BID on 2/18 (home dose is 20 units BID, have been titrating here), now holding given hypoglycemia.  - medium dose sliding scale insulin  - hypoglycemia protocol  - carb coverage 1:10 w/ meals and snacks   - Holding lantus given hypoglycemia    Leukocytosis, fluctuating  Suspect reactive in setting of UGIB. No focal exam or history otherwise. HDS, has been afebrile.  - received ceftriaxone for UGIB for SBP ppx as above  - trend   - Bcx NGTD     Hypervolemic hyponatremia  Na 133 on admission, has been slowly trending down. Edema in feet and ankles noted on 2/16, slightly improved with elevation of legs.   - holding spironolactone given  hyperkalemia  - diuresis as above  - salt restriction    Lactic acidosis, resolved  Lactate 2.8 on admission. Likely 2/2 hypovolemia in the setting of acute GIB. Given 500 ml w/ K shifting on 2/12, then 1u pRBC.     CHRONIC & STABLE PROBLEMS  MDD/anxiety   Autism spectrum   Mother is his caretaker, lives with parents.   - continue PTA fluoxetine  - previously his mother, Leticia, has been able to stay with patient overnight which helps with mood/anxiety. This was discussed with charge RN    Chronic pain, musculoskeletal  - Continue PTA oxycodone 5mg BID PRN  - Gabapentin not a good option - previously caused excessive drowsiness  - Continue Tylenol, max 2 Gm daily  - Will discuss other agents    Malnutrition:   Level of malnutrition: Severe   - Nutrition plan (see note from 2/14)  --> Discontinue clear liquid supplements  --> Add Ensure Plus (vanilla) at 2 PM and Special K protein bar once stephen, also at 2 PM per request       Diet: Snacks/Supplements Adult: Other; See comments below; With Meals  Diet  Combination Diet Regular Diet; High Kcal/High Protein Diet, ADULT; 3 gm K Diet; High Consistent Carb (75 g CHO per Meal) Diet; 2 gm NA Diet    DVT Prophylaxis: Pneumatic compression device  Negron Catheter: Not present  Fluids: None  Lines: None     Cardiac Monitoring: ACTIVE order. Indication: Electrolyte Imbalance (24 hours)- Magnesium <1.3 mg/ml; Potassium < =2.8 or > 5.5 mg/ml  Code Status: Full Code         Disposition Plan      The patient's care was discussed with the Attending Physician, Dr. Kapadia.    Narcisa Willard, MS3  Medical Student  [facility]    Resident/Fellow Attestation   I, Radha Granado MD, was present with the medical/CHRISTA student who participated in the service and in the documentation of the note.  I have verified the history and personally performed the physical exam and medical decision making.  I agree with the assessment and plan of care as documented in the note.      Radha Granado  MD  PGY2  Date of Service (when I saw the patient): 02/22/23   __________________________    Interval History   Nursing notes reviewed. Overnight, mid hypoglycemia. PM glargine was held. PM BMP improved from prior. Dillon reports that he feels quite tired, but alert. Not feeling mentally foggy. Has more diffuse pain today, would like his oxycodone full dose resumed.    Physical Exam   Vital Signs: Temp: 98.2  F (36.8  C) Temp src: Oral BP: 127/60 Pulse: 88   Resp: 18 SpO2: 95 % O2 Device: Nasal cannula Oxygen Delivery: 2 LPM  Weight: 176 lbs 2.36 oz    General Appearance: Sleeping in bed, NAD  Respiratory: CTAB, no wheezing, no increased WOB on 2L NC  Cardiovascular: RRR, III/VI systolic murmur, extremities warm and well perfused  GI: Soft, nontender, no fluid wave  Skin: No rash, some bruising on the lower extremities, 2-3+ edema in lower extremities  Neuro: Oriented x3, no focal deficits. Tremor in bilateral upper extremities. No asterixis.    Medical Decision Making      Please see A&P for additional details of medical decision making.      Data   All labs and imaging reviewed

## 2023-02-23 NOTE — PROVIDER NOTIFICATION
Time of notification: 3:46 AM  Provider notified: Sofya Poon   Patient status: Patient is wondering if he is able to have some jello, something to drink, or if there's any wiggle room in the NPO order?    Orders received: Patient is to adhere to NPO orders. NPO at midnight orders will be discussed with day team for going forward plans.

## 2023-02-23 NOTE — PROGRESS NOTES
Hours of care: [id number]    Neuro: A&Ox4. Alert during shift, didn't seem to get much sleep overnight as pt was alert and awake. [place] scores 0 during shift.   Cardiac: SR. VSS. Afebrile  Respiratory: Sating > 92% on 1-2L NC. Pt requested to try RA, and would do ok for 20-30 mins at a time but O2 would end up between 86-88%.   GI/: Adequate urine output, over 700mL during shift. BM X3  Urinary incontinence episodes x2, bowel incontinence x1.   Diet/appetite: Poor appetite (consistent carb, 2g K+ diet), but did eat a little prior to NPO at midnight, in case transplant becomes available.   Activity:  Assist of 1 w/ walker and GB. Was up to bathroom, commode, and chair throughout shift.   Pain: c/o all over generalized pain/achyness. PRN oxycodone provided with a little relief.    Skin: No new deficits noted.  LDA's: L PIV & R PIV both SL.     Plan: Pt received 1 unit PRB that completed at beginning of shift. Recheck hgb 7.6.  Waiting for liver transplant. Will continue to monitor and follow plan of care. Notify primary team with changes.    Marilu Suazo RN on 2/23/2023 at 6:16 AM

## 2023-02-23 NOTE — PROVIDER NOTIFICATION
Time of notification: 7:37 PM  Provider notified: Sofya 2   Patient status: Patient's mom was concerned about patient's BG and poor appetite. She was wondering about possibly getting something IV to help with BG? Is there a plan for a procedure in the morning, since patient is NPO at midnight?       Orders received: dextrose 10% for low blood sugar if needed. Per MD, NPO order is for in case a transplant becomes available.

## 2023-02-23 NOTE — PROVIDER NOTIFICATION
Time of notification: 11:18pm  Provider notified: Sofya 2   Patient status: Patient is in pain and asking for pain med. Oxycodone is currently on hold, was it d/t sleepiness he was having earlier?     Orders received: Removed hold on oxycodone, since patient is much more alert this evening. Provided pain medication.

## 2023-02-23 NOTE — PROGRESS NOTES
GASTROENTEROLOGY PROGRESS NOTE    Date: 02/23/2023     ASSESSMENT: 27 year old male with a medical history of Asperger's, T2DM, alcohol related cirrhosis c/b hepatic encephalopathy, history of esophageal varices bleeding (5/2022, 1/2023) and rectal varices s/p sclerotherapy 1/2023, ascites presenting with multiple episodes of hematemesis. Underwent EGD 2/11 showed oozing portal hypertensive gastropathy, and healed esophageal ulcer. Now is complicated with ASHISH. He is now listed for liver transplant.    Decompensated cirrhosis with hepatic encephalopathy, h/o esophageal variceal bleed, HE, MELD-Na 33  Protein calories malnutrition  Follows with Dr. Wood. Listed for liver transplant. Will need to continue chemical dependency after transplant.    ASHISH, stable. Initially likely from acute blood loss then now with diuretics use. U Na <20 suggested prerenal or HRS. However only small volume of ascites and never has low blood pressure would be against HRS. Is getting diuretics given volume overload requiring O2. Renal following.    Hepatic encephalopathy, stable  More somnolent for the past few days. On chronic oxycodone for leg pain.     Hypoxia, new. Likely from volume overload, no sign of pneumonia on CXR, or clinical fever. Has mild leukocytosis .    RECOMMENDATIONS:  - Patient is now listed for liver transplant. Patient can eaten until got notified for liver offer.  - ASHISH per nephrology, received albumin challenge, and also on diuretic for volume overload/hypoxia  - Please hold the oxycodone if patient is too somnolent, discussed with patient and RN bedside  - lactulose titrate to 3 BMs/day, and rifaximin  - 2 g low sodium diet; ensure high protein intake. PT/OT    Gastroenterology follow up recommendations: Dr. Wood scheduled for 4/18/23      Discussed with Dr. Nelson.   Lita Vences MD  Gastroenterology/Hepatology Fellow  _____________________________________________________    Subjective: No overt blood  loss. Alert at night and sleepy in the morning. Patient is sleeping. Per mother, is not confused.  Still having leg swelling. Required O2 but no cough or fever.    Objective:  Blood pressure (!) 145/71, pulse 91, temperature 98.3  F (36.8  C), temperature source Oral, resp. rate 17, weight 78.2 kg (172 lb 6.4 oz), SpO2 96 %.  Limited physical exam, patient was sleeping.  Gen:NAD  HEENT: jaundice  CV: RRR  Lungs: breathing on O2, on room air O2 down to 88%  Abd: not distended  LE: Swelling both legs  Skin: no jaundice  Neuro: non focal, somnolent    LABS:  BMP  Recent Labs   Lab 02/23/23  1242 02/23/23  1100 02/23/23  0837 02/23/23  0222 02/22/23  2202 02/22/23 2003 02/22/23  0728 02/22/23  0610 02/22/23  0259 02/22/23  0003   NA  --  127*  --   --   --  131*  --  129*  --  127*   POTASSIUM  --  4.3  --   --   --  4.7  --  5.0  --  5.0   CHLORIDE  --  88*  --   --   --  90*  --  89*  --  87*   SARTHAK  --  9.6  --   --   --  9.4  --  9.5  --  9.8   CO2  --  25  --   --   --  25  --  26  --  23   BUN  --  90.1*  --   --   --  86.0*  --  84.0*  --  79.5*   CR  --  2.05*  --   --   --  2.10*  --  2.26*  --  2.29*   * 189* 165* 173*   < > 111*   < > 115*   < > 105*    < > = values in this interval not displayed.     CBC  Recent Labs   Lab 02/23/23  1100 02/22/23 2003 02/22/23  1331 02/22/23  0610 02/21/23  0431   WBC 12.2*  --  13.3* 14.0* 13.0*   RBC 2.48*  --  2.04* 2.09* 2.11*   HGB 8.1*   < > 6.7* 7.0* 7.1*   HCT 24.2*  --  20.8* 21.3* 21.6*   MCV 98  --  102* 102* 102*   MCH 32.7  --  32.8 33.5* 33.6*   MCHC 33.5  --  32.2 32.9 32.9   RDW 22.5*  --  21.2* 21.5* 22.8*   *  --  110* 114* 111*    < > = values in this interval not displayed.     INR  Recent Labs   Lab 02/23/23  1100 02/22/23  0610 02/21/23  0431 02/20/23  0408   INR 1.80* 2.18* 1.87* 1.68*     LFTs  Recent Labs   Lab 02/23/23  1100 02/22/23  1331 02/22/23  0610 02/21/23  0431 02/20/23  0408   ALKPHOS 101  --  85 99 133*   AST 57*  --  65*  68* 67*   ALT 34  --  34 37 42   BILITOTAL 10.2* 10.4* 10.3* 10.2* 6.3*   PROTTOTAL 7.1  --  6.7 6.7 6.4   ALBUMIN 4.5  --  4.6 4.5 4.2      PANC  No lab results found in last 7 days.MELD-Na score: 33 at 2/23/2023 11:00 AM  MELD score: 29 at 2/23/2023 11:00 AM  Calculated from:  Serum Creatinine: 2.05 mg/dL at 2/23/2023 11:00 AM  Serum Sodium: 127 mmol/L at 2/23/2023 11:00 AM  Total Bilirubin: 10.2 mg/dL at 2/23/2023 11:00 AM  INR(ratio): 1.80 at 2/23/2023 11:00 AM  Age: 27 years    IMAGING:  Reviewed.    Attestation:  This patient has been seen and evaluated by me, Viola Nelson.  Discussed with the house staff team or resident(s) and agree with the findings and plan in this note.

## 2023-02-24 ENCOUNTER — APPOINTMENT (OUTPATIENT)
Dept: OCCUPATIONAL THERAPY | Facility: CLINIC | Age: 28
DRG: 005 | End: 2023-02-24
Attending: STUDENT IN AN ORGANIZED HEALTH CARE EDUCATION/TRAINING PROGRAM
Payer: COMMERCIAL

## 2023-02-24 ENCOUNTER — ORGAN (OUTPATIENT)
Dept: TRANSPLANT | Facility: CLINIC | Age: 28
End: 2023-02-24

## 2023-02-24 ENCOUNTER — ANESTHESIA EVENT (OUTPATIENT)
Dept: SURGERY | Facility: CLINIC | Age: 28
DRG: 005 | End: 2023-02-24
Payer: COMMERCIAL

## 2023-02-24 ENCOUNTER — APPOINTMENT (OUTPATIENT)
Dept: GENERAL RADIOLOGY | Facility: CLINIC | Age: 28
DRG: 005 | End: 2023-02-24
Attending: SURGERY
Payer: COMMERCIAL

## 2023-02-24 LAB
ABO/RH(D): NORMAL
ALBUMIN SERPL BCG-MCNC: 4.2 G/DL (ref 3.5–5.2)
ALBUMIN SERPL BCG-MCNC: 4.2 G/DL (ref 3.5–5.2)
ALBUMIN UR-MCNC: NEGATIVE MG/DL
ALP SERPL-CCNC: 104 U/L (ref 40–129)
ALP SERPL-CCNC: 107 U/L (ref 40–129)
ALT SERPL W P-5'-P-CCNC: 32 U/L (ref 10–50)
ALT SERPL W P-5'-P-CCNC: 37 U/L (ref 10–50)
AMYLASE SERPL-CCNC: 14 U/L (ref 28–100)
ANION GAP SERPL CALCULATED.3IONS-SCNC: 12 MMOL/L (ref 7–15)
ANTIBODY SCREEN: NEGATIVE
APPEARANCE UR: CLEAR
APTT PPP: 47 SECONDS (ref 22–38)
AST SERPL W P-5'-P-CCNC: 59 U/L (ref 10–50)
AST SERPL W P-5'-P-CCNC: 70 U/L (ref 10–50)
BASOPHILS # BLD AUTO: 0.1 10E3/UL (ref 0–0.2)
BASOPHILS NFR BLD AUTO: 1 %
BILIRUB SERPL-MCNC: 8 MG/DL
BILIRUB SERPL-MCNC: 8 MG/DL
BILIRUB UR QL STRIP: NEGATIVE
BUN SERPL-MCNC: 84.1 MG/DL (ref 6–20)
BUN SERPL-MCNC: 84.1 MG/DL (ref 6–20)
BUN SERPL-MCNC: 90.3 MG/DL (ref 6–20)
CALCIUM SERPL-MCNC: 9 MG/DL (ref 8.6–10)
CALCIUM SERPL-MCNC: 9.2 MG/DL (ref 8.6–10)
CALCIUM SERPL-MCNC: 9.2 MG/DL (ref 8.6–10)
CHLORIDE SERPL-SCNC: 92 MMOL/L (ref 98–107)
COLOR UR AUTO: NORMAL
CREAT SERPL-MCNC: 1.52 MG/DL (ref 0.67–1.17)
CREAT SERPL-MCNC: 1.52 MG/DL (ref 0.67–1.17)
CREAT SERPL-MCNC: 1.74 MG/DL (ref 0.67–1.17)
DEPRECATED HCO3 PLAS-SCNC: 30 MMOL/L (ref 22–29)
DEPRECATED HCO3 PLAS-SCNC: 32 MMOL/L (ref 22–29)
DEPRECATED HCO3 PLAS-SCNC: 32 MMOL/L (ref 22–29)
EOSINOPHIL # BLD AUTO: 0.2 10E3/UL (ref 0–0.7)
EOSINOPHIL NFR BLD AUTO: 3 %
ERYTHROCYTE [DISTWIDTH] IN BLOOD BY AUTOMATED COUNT: 21.2 % (ref 10–15)
ERYTHROCYTE [DISTWIDTH] IN BLOOD BY AUTOMATED COUNT: 21.6 % (ref 10–15)
FIBRINOGEN PPP-MCNC: 239 MG/DL (ref 170–490)
GFR SERPL CREATININE-BSD FRML MDRD: 54 ML/MIN/1.73M2
GFR SERPL CREATININE-BSD FRML MDRD: 63 ML/MIN/1.73M2
GFR SERPL CREATININE-BSD FRML MDRD: 63 ML/MIN/1.73M2
GLUCOSE BLDC GLUCOMTR-MCNC: 133 MG/DL (ref 70–99)
GLUCOSE BLDC GLUCOMTR-MCNC: 135 MG/DL (ref 70–99)
GLUCOSE BLDC GLUCOMTR-MCNC: 144 MG/DL (ref 70–99)
GLUCOSE BLDC GLUCOMTR-MCNC: 206 MG/DL (ref 70–99)
GLUCOSE BLDC GLUCOMTR-MCNC: 213 MG/DL (ref 70–99)
GLUCOSE BLDC GLUCOMTR-MCNC: 215 MG/DL (ref 70–99)
GLUCOSE BLDC GLUCOMTR-MCNC: 300 MG/DL (ref 70–99)
GLUCOSE BLDC GLUCOMTR-MCNC: 83 MG/DL (ref 70–99)
GLUCOSE SERPL-MCNC: 112 MG/DL (ref 70–99)
GLUCOSE SERPL-MCNC: 217 MG/DL (ref 70–99)
GLUCOSE SERPL-MCNC: 217 MG/DL (ref 70–99)
GLUCOSE UR STRIP-MCNC: NEGATIVE MG/DL
HCT VFR BLD AUTO: 21.4 % (ref 40–53)
HCT VFR BLD AUTO: 21.6 % (ref 40–53)
HGB BLD-MCNC: 7 G/DL (ref 13.3–17.7)
HGB BLD-MCNC: 7.3 G/DL (ref 13.3–17.7)
HGB UR QL STRIP: NEGATIVE
HYALINE CASTS: 1 /LPF
IMM GRANULOCYTES # BLD: 0.1 10E3/UL
IMM GRANULOCYTES NFR BLD: 1 %
INR PPP: 1.77 (ref 0.85–1.15)
INR PPP: 1.79 (ref 0.85–1.15)
KETONES UR STRIP-MCNC: NEGATIVE MG/DL
LACTATE SERPL-SCNC: 0.8 MMOL/L (ref 0.7–2)
LEUKOCYTE ESTERASE UR QL STRIP: NEGATIVE
LYMPHOCYTES # BLD AUTO: 0.9 10E3/UL (ref 0.8–5.3)
LYMPHOCYTES NFR BLD AUTO: 12 %
MAGNESIUM SERPL-MCNC: 3.8 MG/DL (ref 1.7–2.3)
MCH RBC QN AUTO: 32.3 PG (ref 26.5–33)
MCH RBC QN AUTO: 32.9 PG (ref 26.5–33)
MCHC RBC AUTO-ENTMCNC: 32.7 G/DL (ref 31.5–36.5)
MCHC RBC AUTO-ENTMCNC: 33.8 G/DL (ref 31.5–36.5)
MCV RBC AUTO: 97 FL (ref 78–100)
MCV RBC AUTO: 99 FL (ref 78–100)
MONOCYTES # BLD AUTO: 0.5 10E3/UL (ref 0–1.3)
MONOCYTES NFR BLD AUTO: 6 %
NEUTROPHILS # BLD AUTO: 5.7 10E3/UL (ref 1.6–8.3)
NEUTROPHILS NFR BLD AUTO: 77 %
NITRATE UR QL: NEGATIVE
NRBC # BLD AUTO: 0 10E3/UL
NRBC BLD AUTO-RTO: 0 /100
PH UR STRIP: 5 [PH] (ref 5–7)
PHOSPHATE SERPL-MCNC: 4 MG/DL (ref 2.5–4.5)
PLATELET # BLD AUTO: 114 10E3/UL (ref 150–450)
PLATELET # BLD AUTO: 120 10E3/UL (ref 150–450)
POTASSIUM SERPL-SCNC: 3.7 MMOL/L (ref 3.4–5.3)
POTASSIUM SERPL-SCNC: 3.7 MMOL/L (ref 3.4–5.3)
POTASSIUM SERPL-SCNC: 3.8 MMOL/L (ref 3.4–5.3)
PROT SERPL-MCNC: 6.3 G/DL (ref 6.4–8.3)
PROT SERPL-MCNC: 6.4 G/DL (ref 6.4–8.3)
RBC # BLD AUTO: 2.17 10E6/UL (ref 4.4–5.9)
RBC # BLD AUTO: 2.22 10E6/UL (ref 4.4–5.9)
RBC URINE: <1 /HPF
SODIUM SERPL-SCNC: 134 MMOL/L (ref 136–145)
SODIUM SERPL-SCNC: 136 MMOL/L (ref 136–145)
SODIUM SERPL-SCNC: 136 MMOL/L (ref 136–145)
SP GR UR STRIP: 1.01 (ref 1–1.03)
SPECIMEN EXPIRATION DATE: NORMAL
SQUAMOUS EPITHELIAL: <1 /HPF
UROBILINOGEN UR STRIP-MCNC: NORMAL MG/DL
WBC # BLD AUTO: 7.3 10E3/UL (ref 4–11)
WBC # BLD AUTO: 8.5 10E3/UL (ref 4–11)
WBC URINE: 1 /HPF

## 2023-02-24 PROCEDURE — 83605 ASSAY OF LACTIC ACID: CPT

## 2023-02-24 PROCEDURE — 85384 FIBRINOGEN ACTIVITY: CPT | Performed by: SURGERY

## 2023-02-24 PROCEDURE — 85730 THROMBOPLASTIN TIME PARTIAL: CPT | Performed by: SURGERY

## 2023-02-24 PROCEDURE — 36415 COLL VENOUS BLD VENIPUNCTURE: CPT

## 2023-02-24 PROCEDURE — 86850 RBC ANTIBODY SCREEN: CPT | Performed by: SURGERY

## 2023-02-24 PROCEDURE — 86645 CMV ANTIBODY IGM: CPT | Performed by: SURGERY

## 2023-02-24 PROCEDURE — 82150 ASSAY OF AMYLASE: CPT | Performed by: SURGERY

## 2023-02-24 PROCEDURE — 250N000013 HC RX MED GY IP 250 OP 250 PS 637: Performed by: STUDENT IN AN ORGANIZED HEALTH CARE EDUCATION/TRAINING PROGRAM

## 2023-02-24 PROCEDURE — 250N000013 HC RX MED GY IP 250 OP 250 PS 637

## 2023-02-24 PROCEDURE — 83735 ASSAY OF MAGNESIUM: CPT | Performed by: SURGERY

## 2023-02-24 PROCEDURE — 36415 COLL VENOUS BLD VENIPUNCTURE: CPT | Performed by: SURGERY

## 2023-02-24 PROCEDURE — 85027 COMPLETE CBC AUTOMATED: CPT | Performed by: SURGERY

## 2023-02-24 PROCEDURE — 84100 ASSAY OF PHOSPHORUS: CPT | Performed by: SURGERY

## 2023-02-24 PROCEDURE — 86665 EPSTEIN-BARR CAPSID VCA: CPT | Performed by: SURGERY

## 2023-02-24 PROCEDURE — 86704 HEP B CORE ANTIBODY TOTAL: CPT | Performed by: SURGERY

## 2023-02-24 PROCEDURE — 97140 MANUAL THERAPY 1/> REGIONS: CPT | Mod: GO | Performed by: OCCUPATIONAL THERAPIST

## 2023-02-24 PROCEDURE — 86706 HEP B SURFACE ANTIBODY: CPT | Performed by: SURGERY

## 2023-02-24 PROCEDURE — 86644 CMV ANTIBODY: CPT | Performed by: SURGERY

## 2023-02-24 PROCEDURE — 250N000011 HC RX IP 250 OP 636

## 2023-02-24 PROCEDURE — 999N000248 HC STATISTIC IV INSERT WITH US BY RN

## 2023-02-24 PROCEDURE — 86901 BLOOD TYPING SEROLOGIC RH(D): CPT | Performed by: SURGERY

## 2023-02-24 PROCEDURE — 97535 SELF CARE MNGMENT TRAINING: CPT | Mod: GO

## 2023-02-24 PROCEDURE — 99233 SBSQ HOSP IP/OBS HIGH 50: CPT | Mod: GC | Performed by: INTERNAL MEDICINE

## 2023-02-24 PROCEDURE — 93005 ELECTROCARDIOGRAM TRACING: CPT

## 2023-02-24 PROCEDURE — 99232 SBSQ HOSP IP/OBS MODERATE 35: CPT | Mod: GC | Performed by: INTERNAL MEDICINE

## 2023-02-24 PROCEDURE — 87389 HIV-1 AG W/HIV-1&-2 AB AG IA: CPT | Performed by: SURGERY

## 2023-02-24 PROCEDURE — 85610 PROTHROMBIN TIME: CPT

## 2023-02-24 PROCEDURE — 85027 COMPLETE CBC AUTOMATED: CPT

## 2023-02-24 PROCEDURE — 87340 HEPATITIS B SURFACE AG IA: CPT | Performed by: SURGERY

## 2023-02-24 PROCEDURE — 93010 ELECTROCARDIOGRAM REPORT: CPT | Mod: 59 | Performed by: INTERNAL MEDICINE

## 2023-02-24 PROCEDURE — 86923 COMPATIBILITY TEST ELECTRIC: CPT

## 2023-02-24 PROCEDURE — 120N000003 HC R&B IMCU UMMC

## 2023-02-24 PROCEDURE — 85610 PROTHROMBIN TIME: CPT | Performed by: SURGERY

## 2023-02-24 PROCEDURE — 71046 X-RAY EXAM CHEST 2 VIEWS: CPT

## 2023-02-24 PROCEDURE — 87521 HEPATITIS C PROBE&RVRS TRNSC: CPT | Performed by: SURGERY

## 2023-02-24 PROCEDURE — 80053 COMPREHEN METABOLIC PANEL: CPT

## 2023-02-24 PROCEDURE — 81001 URINALYSIS AUTO W/SCOPE: CPT | Performed by: SURGERY

## 2023-02-24 PROCEDURE — 71046 X-RAY EXAM CHEST 2 VIEWS: CPT | Mod: 26 | Performed by: RADIOLOGY

## 2023-02-24 PROCEDURE — 84155 ASSAY OF PROTEIN SERUM: CPT | Performed by: SURGERY

## 2023-02-24 PROCEDURE — 86803 HEPATITIS C AB TEST: CPT | Performed by: SURGERY

## 2023-02-24 PROCEDURE — 97110 THERAPEUTIC EXERCISES: CPT | Mod: GO

## 2023-02-24 RX ORDER — FIBRINOGEN (HUMAN) 700-1300MG
1 KIT INTRAVENOUS ONCE
Status: DISCONTINUED | OUTPATIENT
Start: 2023-02-24 | End: 2023-02-26

## 2023-02-24 RX ORDER — NOREPINEPHRINE BITARTRATE 0.06 MG/ML
.01-.6 INJECTION, SOLUTION INTRAVENOUS CONTINUOUS
Status: DISCONTINUED | OUTPATIENT
Start: 2023-02-24 | End: 2023-02-28 | Stop reason: HOSPADM

## 2023-02-24 RX ORDER — LIDOCAINE 40 MG/G
CREAM TOPICAL
Status: DISCONTINUED | OUTPATIENT
Start: 2023-02-24 | End: 2023-02-24

## 2023-02-24 RX ORDER — FLUCONAZOLE 2 MG/ML
400 INJECTION, SOLUTION INTRAVENOUS ONCE
Status: DISCONTINUED | OUTPATIENT
Start: 2023-02-24 | End: 2023-02-24

## 2023-02-24 RX ORDER — BUMETANIDE 0.25 MG/ML
3 INJECTION INTRAMUSCULAR; INTRAVENOUS
Status: COMPLETED | OUTPATIENT
Start: 2023-02-24 | End: 2023-02-24

## 2023-02-24 RX ORDER — PIPERACILLIN SODIUM, TAZOBACTAM SODIUM 3; .375 G/15ML; G/15ML
3.38 INJECTION, POWDER, LYOPHILIZED, FOR SOLUTION INTRAVENOUS
Status: DISCONTINUED | OUTPATIENT
Start: 2023-02-24 | End: 2023-02-24

## 2023-02-24 RX ORDER — PIPERACILLIN SODIUM, TAZOBACTAM SODIUM 3; .375 G/15ML; G/15ML
3.38 INJECTION, POWDER, LYOPHILIZED, FOR SOLUTION INTRAVENOUS ONCE
Status: DISCONTINUED | OUTPATIENT
Start: 2023-02-24 | End: 2023-02-24

## 2023-02-24 RX ADMIN — RIFAXIMIN 550 MG: 550 TABLET ORAL at 21:11

## 2023-02-24 RX ADMIN — FLUOXETINE 60 MG: 20 CAPSULE ORAL at 22:50

## 2023-02-24 RX ADMIN — THERA TABS 1 TABLET: TAB at 08:19

## 2023-02-24 RX ADMIN — FOLIC ACID 1 MG: 1 TABLET ORAL at 08:19

## 2023-02-24 RX ADMIN — RIFAXIMIN 550 MG: 550 TABLET ORAL at 08:19

## 2023-02-24 RX ADMIN — PANTOPRAZOLE SODIUM 40 MG: 40 TABLET, DELAYED RELEASE ORAL at 08:19

## 2023-02-24 RX ADMIN — LACTULOSE 20 G: 10 POWDER, FOR SOLUTION ORAL at 21:11

## 2023-02-24 RX ADMIN — PANTOPRAZOLE SODIUM 40 MG: 40 TABLET, DELAYED RELEASE ORAL at 21:11

## 2023-02-24 RX ADMIN — OXYCODONE HYDROCHLORIDE 5 MG: 5 TABLET ORAL at 17:41

## 2023-02-24 RX ADMIN — BUMETANIDE 3 MG: 0.25 INJECTION INTRAMUSCULAR; INTRAVENOUS at 17:35

## 2023-02-24 RX ADMIN — BUMETANIDE 3 MG: 0.25 INJECTION INTRAMUSCULAR; INTRAVENOUS at 09:57

## 2023-02-24 RX ADMIN — LACTULOSE 20 G: 10 POWDER, FOR SOLUTION ORAL at 08:19

## 2023-02-24 RX ADMIN — THIAMINE HCL TAB 100 MG 100 MG: 100 TAB at 08:19

## 2023-02-24 ASSESSMENT — ACTIVITIES OF DAILY LIVING (ADL)
ADLS_ACUITY_SCORE: 35
ADLS_ACUITY_SCORE: 39
ADLS_ACUITY_SCORE: 35
ADLS_ACUITY_SCORE: 39
ADLS_ACUITY_SCORE: 35
ADLS_ACUITY_SCORE: 39
ADLS_ACUITY_SCORE: 35
ADLS_ACUITY_SCORE: 39
ADLS_ACUITY_SCORE: 39
ADLS_ACUITY_SCORE: 35
ADLS_ACUITY_SCORE: 39
ADLS_ACUITY_SCORE: 35

## 2023-02-24 NOTE — PROGRESS NOTES
Nephrology Progress Note  02/23/2023         Assessment & Recommendations:   # ASHISH likely cardiorenal  # Hyperkalemia  # Hypochloremia  #Hypervolumia gaining weight, has not gotten diuretic since the 2/18, echo concerning for volume overload,Edema  # Cirrhosis  #Compensated Respiratory acidosis: PH of 7.38/54/32  Dillon Salter is a 27 year old male with history of Asperger's, alcoholic cirrhosis c/b esophageal varices and hepatic encephalopathy currently undergoing liver transplant evaluation at [facility], DM II who was directly admitted from [facility] for hematemesis, decompensated cirrhosis and hepatic encephalopathy.  Nephrology consulted for ASHISH and hyperkalemia.      Patient initially admitted for hematemesis and now with worsening creatinine. Creatinine elevated to 1.68 on initial consult evaluation, baseline creatinine 0.7 - 0.8. On initial exam, pt has moist mucous membranes, no ascites, clear lungs and 2-3+ BLE pitting edema. Creatinine continues to rise.         Recommendations:  - Continue IV bumex 3 mg bid  with the goal of net negative 1 liter or less.  - Accurate I/O's  -Daily standing weight  -discontinue lokelma  - Monitor BMP   Recommendations were communicated to primary team via verbally    Seen and discussed with Dr. Irineo Medina MD   Division of Renal Disease and Hypertension  [facility]  [employer]  Vocera Web Console    Interval History :   Nursing and provider notes from last 24 hours reviewed.  In the last 24 hours Dillon Salter will give IV Bumex 3 mg BID as it appears he responded to it.    Review of Systems:   I reviewed the following systems:  GI: decreased appetite. No nausea or vomiting or diarrhea.   Neuro:  No confusion  Constitutional:  No fever or chills  CV: positive dyspnea or edema.  No chest pain.    Physical Exam:   I/O last 3 completed shifts:  In: 660 [P.O.:360]  Out: 1175 [Urine:1175]   /73 (BP Location: Right arm)   Pulse 92   Temp 97.3  F (36.3   C) (Oral)   Resp 16   Wt 78.2 kg (172 lb 6.4 oz)   SpO2 97%   BMI 28.69 kg/m       GENERAL APPEARANCE: Patient is not in distress  EYES:  Positive scleral icterus, pupils equal  PULM: Crackles in bilateral lower lung bases, equal air movement, no clubbing  CV: Patient has regular rhythm, normal rate, no rub     -JVD -ve     -edema +3  GI: soft, non tender, non distended, bowel sounds are present  INTEGUMENT: no cyanosis, no rash  NEURO:  AOx3, grossly nonfocal, moves all extremities spontaneously  Access none    Labs:   All labs reviewed by me  Electrolytes/Renal -   Recent Labs   Lab Test 02/23/23 2037 02/23/23 2002 02/23/23  1803 02/23/23  1242 02/23/23  1100 02/22/23  2202 02/22/23 2003 02/12/23  0839 02/12/23  0811 01/28/23  0602 01/28/23  0541 01/27/23  0802 01/27/23  0347   NA  --   --  131*  --  127*  --  131*   < >  --    < > 129*  --  131*   POTASSIUM  --   --  4.0  --  4.3  --  4.7   < > 6.3*   < > 4.9  --  3.9   CHLORIDE  --   --  89*  --  88*  --  90*   < >  --    < > 97*  --  98   CO2  --   --  28  --  25  --  25   < >  --    < > 24  --  25   BUN  --   --  88.1*  --  90.1*  --  86.0*   < >  --    < > 19.7  --  11.7   CR  --   --  1.88*  --  2.05*  --  2.10*   < >  --    < > 0.89  --  0.65*   * 226* 196*   < > 189*   < > 111*   < >  --    < > 266*   < > 141*   SARTHAK  --   --  9.4  --  9.6  --  9.4   < >  --    < > 8.0*  --  8.1*   MAG  --   --   --   --   --   --   --   --  1.6*  --  2.0  --  1.6*   PHOS  --   --   --   --   --   --   --   --  4.6*  --  2.5  --  2.3*    < > = values in this interval not displayed.       CBC -   Recent Labs   Lab Test 02/23/23 1100 02/22/23 2003 02/22/23 1331 02/22/23  0610   WBC 12.2*  --  13.3* 14.0*   HGB 8.1* 7.6* 6.7* 7.0*   *  --  110* 114*       LFTs -   Recent Labs   Lab Test 02/23/23 1100 02/22/23 1331 02/22/23  0610 02/21/23  0431   ALKPHOS 101  --  85 99   BILITOTAL 10.2* 10.4* 10.3* 10.2*   ALT 34  --  34 37   AST 57*  --  65* 68*    PROTTOTAL 7.1  --  6.7 6.7   ALBUMIN 4.5  --  4.6 4.5       Iron Panel -   Recent Labs   Lab Test 02/22/23  0610 02/16/23  0543 12/20/22  0703 10/06/22  0958 04/07/22  0624   IRON 26*  --  249*  --  85   IRONSAT 19  --   --   --   --    ASCENCION 1,465*   < > 2,207*   < > 423*    < > = values in this interval not displayed.         Imaging:  All imaging studies reviewed by me.     Current Medications:    [Held by provider] bumetanide  1 mg Oral Daily     [Held by provider] carvedilol  6.25 mg Oral BID     FLUoxetine  60 mg Oral At Bedtime     folic acid  1 mg Oral Daily     insulin aspart  1-7 Units Subcutaneous TID AC     insulin aspart  1-5 Units Subcutaneous At Bedtime     insulin aspart   Subcutaneous Daily with breakfast     insulin aspart   Subcutaneous Daily with lunch     insulin aspart   Subcutaneous Daily with supper     insulin glargine  10 Units Subcutaneous BID     lactulose  20 g Oral BID     multivitamin, therapeutic  1 tablet Per Feeding Tube Daily     pantoprazole  40 mg Oral BID     rifaximin  550 mg Oral BID     sodium chloride (PF)  3 mL Intracatheter Q8H     sodium zirconium cyclosilicate  10 g Oral BID     [Held by provider] spironolactone  100 mg Oral At Bedtime     thiamine  100 mg Oral Daily       - MEDICATION INSTRUCTIONS -       dextrose       I was present with the fellow during the history and exam.  I discussed the case with the fellow and agree with the findings as documented in the assessment and plan.  Renal function somewhat stable, diuresing with multiple urine occurrences, monitor weight and will continue to gently diurese.  Khushbu Cabello MD   of Medicine  Department of Nephrology  [facility]

## 2023-02-24 NOTE — PLAN OF CARE
Neuro: A&OX4. Able to have more activity in today. Following commands.    Cardiac: SR 80's. VSS. Afebrile       Respiratory: Sating 90-91% on RA. Placed on 1-2L NC.GONZALEZ w/ activity.   GI/: incontinence at times. BMx2. See flowsheets for I&O's   Diet/appetite: Tolerating 2g K+, consistent carb, low sodium diet. Needing encouragement.   Activity:  Assist of 1-2 w/ gaitbelt and walker, up to chair and in halls.   Pain: Oxycodone given x1 for generalized pain.   Skin: No new deficits noted. Lymph wraps placed today   LDA's: R/L PIV.       Plan: Continue with POC. Notify primary team with changes.

## 2023-02-24 NOTE — PROGRESS NOTES
GASTROENTEROLOGY PROGRESS NOTE    Date: 02/24/2023     ASSESSMENT: 27 year old male with Asperger's, T2DM, alcohol related cirrhosis c/b hepatic encephalopathy, history of esophageal varices bleeding (5/2022, 1/2023) and rectal varices s/p sclerotherapy 1/2023, ascites presenting with hematemesis. Underwent EGD 2/11 showed oozing portal hypertensive gastropathy. Now is complicated with ASHISH. He is now listed for liver transplant.    Decompensated cirrhosis with hepatic encephalopathy, h/o esophageal variceal bleed, HE, MELD-Na 27  Protein calories malnutrition  Follows with Dr. Wood. Listed for liver transplant. Will need to continue chemical dependency after transplant.    ASHISH, stable. Initially likely from acute blood loss then now with diuretics use. U Na <20 suggested prerenal or HRS. However only small volume of ascites and never has low blood pressure would be against HRS. Is getting diuretics given volume overload requiring O2. Renal following.    Hepatic encephalopathy, resolved    Hypoxia, improving. From volume overload, no sign of pneumonia on CXR, or clinical fever. Has mild leukocytosis .    RECOMMENDATIONS:  - Patient is now listed for liver transplant.  - diuretics and fluid per nephrology  - Minimizel sedation/opioid med use  - lactulose titrate to 3 BMs/day, and rifaximin  - 2 g low sodium diet; ensure high protein intake. PT/OT    Gastroenterology follow up recommendations: Dr. Wood scheduled for 4/18/23      Discussed with Dr. Nelson.   Lita Vences MD  Gastroenterology/Hepatology Fellow  _____________________________________________________    Subjective: No overt blood loss. No longer confused. Leg swelling improved. Decreased O2 need.    Objective:  Blood pressure (!) 148/89, pulse 90, temperature 98.5  F (36.9  C), temperature source Oral, resp. rate 16, weight 78.2 kg (172 lb 6.4 oz), SpO2 98 %.    Gen:NAD, alert and awake  HEENT: jaundice  CV: RRR  Lungs: breathing on O2  Abd: not  distended  LE: Swelling both legs  Skin: no jaundice  Neuro: non focal, no asterixis, AAOx4    LABS:  BMP  Recent Labs   Lab 02/24/23  1252 02/24/23  1115 02/24/23  0739 02/24/23  0510 02/23/23 2002 02/23/23  1803 02/23/23  1242 02/23/23  1100 02/22/23  2202 02/22/23 2003   NA  --   --   --  134*  --  131*  --  127*  --  131*   POTASSIUM  --   --   --  3.8  --  4.0  --  4.3  --  4.7   CHLORIDE  --   --   --  92*  --  89*  --  88*  --  90*   SARTHAK  --   --   --  9.0  --  9.4  --  9.6  --  9.4   CO2  --   --   --  30*  --  28  --  25  --  25   BUN  --   --   --  90.3*  --  88.1*  --  90.1*  --  86.0*   CR  --   --   --  1.74*  --  1.88*  --  2.05*  --  2.10*   * 133* 83 112*   < > 196*   < > 189*   < > 111*    < > = values in this interval not displayed.     CBC  Recent Labs   Lab 02/24/23  0510 02/23/23  1100 02/22/23  2003 02/22/23  1331 02/22/23  0610   WBC 8.5 12.2*  --  13.3* 14.0*   RBC 2.22* 2.48*  --  2.04* 2.09*   HGB 7.3* 8.1*   < > 6.7* 7.0*   HCT 21.6* 24.2*  --  20.8* 21.3*   MCV 97 98  --  102* 102*   MCH 32.9 32.7  --  32.8 33.5*   MCHC 33.8 33.5  --  32.2 32.9   RDW 21.6* 22.5*  --  21.2* 21.5*   * 133*  --  110* 114*    < > = values in this interval not displayed.     INR  Recent Labs   Lab 02/24/23  0510 02/23/23  1100 02/22/23  0610 02/21/23  0431   INR 1.77* 1.80* 2.18* 1.87*     LFTs  Recent Labs   Lab 02/24/23  0510 02/23/23  1100 02/22/23  1331 02/22/23  0610 02/21/23  0431   ALKPHOS 107 101  --  85 99   AST 59* 57*  --  65* 68*   ALT 32 34  --  34 37   BILITOTAL 8.0* 10.2* 10.4* 10.3* 10.2*   PROTTOTAL 6.3* 7.1  --  6.7 6.7   ALBUMIN 4.2 4.5  --  4.6 4.5      PANC  No lab results found in last 7 days.MELD-Na score: 27 at 2/24/2023  5:10 AM  MELD score: 26 at 2/24/2023  5:10 AM  Calculated from:  Serum Creatinine: 1.74 mg/dL at 2/24/2023  5:10 AM  Serum Sodium: 134 mmol/L at 2/24/2023  5:10 AM  Total Bilirubin: 8.0 mg/dL at 2/24/2023  5:10 AM  INR(ratio): 1.77 at 2/24/2023  5:10  AM  Age: 27 years    IMAGING:  Reviewed.    Attestation:  This patient has been seen and evaluated by me, Viola Nelson.  Discussed with the house staff team or resident(s) and agree with the findings and plan in this note.

## 2023-02-24 NOTE — PROGRESS NOTES
[facility]    Progress Note - Medicine Service, MAROON TEAM 2       Date of Admission:  2/11/2023    Assessment & Plan   Dillon Salter is a 27 year old male with history of Asperger's, alcoholic cirrhosis c/b esophageal varices and hepatic encephalopathy currently undergoing liver transplant evaluation at [facility], DM II who was directly admitted from [facility] for hematemesis, decompensated cirrhosis and hepatic encephalopathy. Hemodynamically stable with resolved encephalopathy. Course complicated by ASHISH. Completed expedited liver transplant workup with GI, now listed.    Changes Today:  - patient is currently listed for liver transplant  - 3 mg IV bumex x2 today  - strict I/O  - monitoring sugars  - resumed oxycodone for pain      ASHISH non-oliguric, improving  Baseline Cr <1, acutely worsened 2/18 to 1.37 from 0.8, and rising. With worsening, trialed albumin 1g/kg x3 days and continue to hold diuresis. Cr peaked at 2.2 on 2/22, currently downtrending as of 2/24. DDx hepatorenal vs cardiorenal, improving with IV diuresis.  - Strict I/O  - Hold coreg (per GI, though less likely due to BP)  - Trend CMP  - Avoid nephrotoxic agents   - s/p 500cc NS bolus 2/20  - s/p albumin challenge 1g/kg x3 days  - Nephrology following, appreciate recs    > IV bumex 1mg 2/21, 2 mg 2/22, 3 mg 2/22, 3 mg x2 2/23 - improved output  - repeat diuresis with 3 mg IV Bumex x2 today  - repeat UA 2/22: bland    Acute hypoxic respiratory failure 2/2 acute pulmonary edema  Overnight 2/21-2/22 AM patient noticed shortness of breath. CXR with opacities likely related to edema. Received 1g IV bumex overnight. Sats up to 97% on 2L NC.  - diuresis as above    Acute on chronic blood loss anemia- stable  Hematemesis- resolved  Hx of esophageal varices   Portal hypertensive gastropathy  Esophageal ulceration  Recurrent rectal varices  2-3 episodes hematemesis prior to ED of ~400 mL, ~325 mL at  ER, 250 mL anu hematemesis since arrival. HDS. Given 1 unit pRBCs on 2/12 for Hgb of 6.9. INR 1.48. Esophageal varices recently banded 1/23/23. 2 20 gauge IVs in place. EGD 2/12 AM: diffuse oozing likely 2/2 portal hypertensive gastropathy, no bleeding seen from recent banding, nonbleeding esophageal ulcers seen. No continued hematemesis. Transfused for Hgb 6.8 on 2/15 and for Hgb 6.6 on 2/20.  - trend hgb daily and transfuse for <7  - pantoprazole 40mg PO BID  - finished course of ceftriaxone 1g qday for total of 7d  - octreotide discontinued 2/13, restarted evening of 2/13 for presumed hematemesis, discontinued 2/14  - coreg 6.25mg BID, holding now given ASHISH  - GI following    > follow up with GI for a repeat EGD with possible RFA in 3 months    > will need CMP and CBC 1 week after discharge (GI to set up)  - iron studies and hemolysis labs for continued downtrend in Hgb- peripheral smear with normochromic macrocytic anemia with acanthocytes, likely related to liver disease    Hyperkalemia, resolved  Moderate 6.1 2/19AM, in setting of ASHISH. Has been as high as 6.3 this admission previously thought to be related to potential absorption of blood from gastropathy, and/or insulin deficient state, now most likely elevated in the setting of ASHISH. Has received lokelma and kayexalate on several occassions, shifted with insulin multiple times. No associated EKG changes on multiple checks.   - Trend K   - Lokelma discontinued  - nephrology following, as above    Hepatic encephalopathy, resolved  Decompensated alcohol related cirrhosis c/b esophageal varices   Portal HTN and splenomegaly  Coagulopathy due to liver disease  Thrombocytopenia due to liver disease, present on admission  Follows with Dr. Wood. MELD-Na 24 today  Etiology: Alcohol, diagnosed 4/2022  HE: lactulose BID, continue rifaximin  EV/GV: esophageal varices s/p banding 1/23/23, stable on EGD 2/12 without evidence of bleeding; on carvedilol 6.25mg BID, held  2/18 with ASHISH  Coagulopathy: INR 1.48,  (suspect 171 on admission was hemoconcentration)  Ascites: None per RUQ 2/12, held PTA bumex 1 mg and spironolactone given ASHISH -> bumex 2/16 - 2/18 with ASHISH, resumed 2/21  SBP:  no h/o   HCC: last CT 1/2023, no evidence of  Liver transplant candidacy: Last drink 4/2022, follows with Dr. Wood. Ongoing liver transplant eval outpatient including Chemical dependency assessment and programming/Mental health referral, ongoing. CT angiogram, scheduled 4/2023. PFTs given history of tobacco use  - GI consulted, appreciate recs  - daily MELD labs  - NG placement for lactulose administration per [facility] protocol 2/12, NG tube removed 2/13 as patient's mental status improved and able to take meds PO  - <2g Na, <2L water, >1.5 g/kg/day protein diet  - Bcx NGTD  - expedited liver transplant eval per GI    > dobutamine stress test 2/22 WNL    > repeat peth level negative    MELD-Na score: 27 at 2/24/2023  5:10 AM  MELD score: 26 at 2/24/2023  5:10 AM  Calculated from:  Serum Creatinine: 1.74 mg/dL at 2/24/2023  5:10 AM  Serum Sodium: 134 mmol/L at 2/24/2023  5:10 AM  Total Bilirubin: 8.0 mg/dL at 2/24/2023  5:10 AM  INR(ratio): 1.77 at 2/24/2023  5:10 AM  Age: 27 years    Hypoglycemia  Hyperglycemia  DM II - hgb A1c 5.6%  Glargine increased to 28 units BID on 2/18 (home dose is 20 units BID, have been titrating here), now holding given hypoglycemia.  - medium dose sliding scale insulin  - hypoglycemia protocol  - carb coverage 1:10 w/ meals and snacks   - lantus 10 units BID    Leukocytosis, fluctuating  Suspect reactive in setting of UGIB. No focal exam or history otherwise. HDS, has been afebrile.  - received ceftriaxone for UGIB for SBP ppx as above  - trend   - Bcx NGTD     Hypervolemic hyponatremia  Na 133 on admission, has been slowly trending down. Edema in feet and ankles noted on 2/16, slightly improved with elevation of legs.   - holding spironolactone given  hyperkalemia  - diuresis as above  - salt restriction    Lactic acidosis, resolved  Lactate 2.8 on admission. Likely 2/2 hypovolemia in the setting of acute GIB. Given 500 ml w/ K shifting on 2/12, then 1u pRBC.     CHRONIC & STABLE PROBLEMS  MDD/anxiety   Autism spectrum   Mother is his caretaker, lives with parents.   - continue PTA fluoxetine  - previously his mother, Leticia, has been able to stay with patient overnight which helps with mood/anxiety. This was discussed with charge RN    Chronic pain, musculoskeletal  - Continue PTA oxycodone 5mg BID PRN  - Gabapentin not a good option - previously caused excessive drowsiness  - Continue Tylenol, max 2 Gm daily  - Will discuss other agents    Malnutrition:   Level of malnutrition: Severe   - Nutrition plan (see note from 2/14)  --> Discontinue clear liquid supplements  --> Add Ensure Plus (vanilla) at 2 PM and Special K protein bar once stephen, also at 2 PM per request       Diet: Snacks/Supplements Adult: Other; See comments below; With Meals  Diet  Combination Diet Regular Diet; High Kcal/High Protein Diet, ADULT; 3 gm K Diet; High Consistent Carb (75 g CHO per Meal) Diet; 2 gm NA Diet    DVT Prophylaxis: Pneumatic compression device  Negron Catheter: Not present  Fluids: None  Lines: None     Cardiac Monitoring: None  Code Status: Full Code         Disposition Plan      The patient's care was discussed with the Attending Physician, Dr. Kapadia.    Narcisa Willard, MS3  Medical Student  [place] [id number] Service    Resident/Fellow Attestation   I, Radha Granado MD, was present with the medical/CHRISTA student who participated in the service and in the documentation of the note.  I have verified the history and personally performed the physical exam and medical decision making.  I agree with the assessment and plan of care as documented in the note.      Radha Granado MD  PGY2  Date of Service (when I saw the patient): 02/24/23   __________________________    Interval History  "  Nursing notes reviewed. No acute events overnight. Almost got a liver yesterday, but the donor coded and passed. Mother Leticia is dealing with the \"rollercoaster of emotions\" that comes with being on the transplant list. Dillon feels overall well today, still somewhat tired this morning. Continued achy pain that is improved with resuming oxycodone. Denies any shortness of breath, chest pain, or abdominal pain. Continued swelling in lower extremities.    Physical Exam   Vital Signs: Temp: 98.3  F (36.8  C) Temp src: Oral BP: 125/59 Pulse: 86   Resp: 14 SpO2: 98 % O2 Device: Nasal cannula Oxygen Delivery: 1.5 LPM  Weight: 172 lbs 6.4 oz    General Appearance: Sleeping in bed, NAD  Respiratory: CTAB, some crackles noted bilaterally, no increased WOB on 1.5L NC  Cardiovascular: RRR, III/VI systolic murmur, extremities warm and well perfused  GI: Soft, nontender, no fluid wave  Skin: No rash, some bruising on the lower extremities, 2-3+ edema in lower extremities (wrapped)  Neuro: Oriented x3, no focal deficits. Tremor in bilateral upper extremities.     Medical Decision Making      Please see A&P for additional details of medical decision making.      Data   All labs and imaging reviewed   "

## 2023-02-24 NOTE — PLAN OF CARE
Goal Outcome Evaluation:      Plan of Care Reviewed With: patient    Overall Patient Progress: decliningOverall Patient Progress: declining    Outcome Evaluation: Continues to meet malnutrition criteria; now severe with significant decline to PO intake over past week. Continue ONS and discussed lower potassium options. Multiple diet restrictions in place; rec for MD to minimize diet restrictions as able to optimize PO. See RD note for full assessment.

## 2023-02-24 NOTE — TELEPHONE ENCOUNTER
Organ Offer Encounter Information    Organ Offer Information  Organ offer date & time: 2/23/2023  7:49 PM  Coordinator/Fellow/Attending name: Radha Coppola, RN   Organ(s):  Organ UNOS ID Match Run ID Comment Organ Laterality   [place] [id number] [facility]       Organ offer decision status Patient/Other: Accepted Offer  Organ disposition: Case Cancelled - Other (specify) (Comment: donor coded and passed)  Additional Comments: 2/23/2023 7:50 PM  Liver: Primary, import  MD: Alcon/Kenny  OPO Contact: Marilin [phone]  Donor/Recip HCV Status: neg/neg  (HCV+ Donors - Discuss HCV genotyping/quant testing with MD & send Epic in basket message to [facility] HCV POOL - Include Donor UNOS ID)  Donor Nutritional Status: on IV dextrose  Donor Meets Risk Criteria for HIV/HCV/HBV: no  Plan: Patient currently hospitalized.  MD to speak to patient and/or family.  Dr. Rondon spoke with the patient and family around 2000 and discussed offer and they wanted to move forward. OPO is going to work on placing lungs overnight and work on setting the donor OR in the morning (unless lungs accepted before than) We requested a midnight OR on 2/25 with local recovery as of now but that may change depending on our cases. Will need to place Pre-op orders in the morning. There is a possibility the liver may be too large, onsite coordinator aware and encouraged to find a strong backup.     Admissions: Already admitted  Unit: 6B  Immunology: Notified  gretel via page @ 2005  Inpatient Lab (COVID Testing [phone], Option 2): Will order STAT  Research: ON HOLD  Add Organ: added @ 2000      To DO:  Update Provider Entering Orders (XM Plan & COVID Testing):    Book OR:   Vessel Storage Confirmation (PA/REGINALD/FABIENNE):   Blood Bank:   TransNet/ABO Verification:     2/24/2023 2:57 AM:  [facility] [place] [facility] [place] that the donor coded and passed away and therefore is unable to donate organs; Dr. Rondon and Kenny notified and  I will call the recipient and notify him as well.   Radha Coppola RN  Donor      Attestation I have discussed all of the above with the Patient/Legal Guardian/Caregiver regarding this organ offer.: Yes  Coordinator/Fellow/Attending name: Radha Coppola RN

## 2023-02-24 NOTE — PLAN OF CARE
Neuro: A&OX3-4. Confused at times. Able to make needs known.   Cardiac: Tele orders. NSR with rare PAC's. VSS. Afebrile.   Respiratory: Sating >94% on 2L NC. Crackles in bases of bilateral lungs, expiratory wheezes, GONZALEZ.   GI/: Incontinent of urine at times. No BM this shift.   Diet/appetite: Tolerating 2g K+, consistent carb, low sodium diet. Needing encouragement. Carb coverage insulin orders in place.    Activity:  Assist of 1-2 w/ gaitbelt and walker, up to chair and in halls.   Pain: PRN Oxycodone given x1 for generalized pain. C/o most pain in BLE.  Skin: No new deficits noted. Lymph wraps on BLE in place.  LDA's: R PIV - SL, L PIV -SL.       Plan: Continue with POC. Notify primary team with changes. Continue to encourage oral intake. Continue to encourage activity OOB.    Mark Moctezuma RN on 2/24/2023 at 6:58 AM

## 2023-02-24 NOTE — PROGRESS NOTES
CLINICAL NUTRITION SERVICES - REASSESSMENT NOTE     Nutrition Prescription    RECOMMENDATIONS FOR MDs/PROVIDERS TO ORDER:  - Minimize diet restrictions as medically able, especially given significant decline to PO intake.     Malnutrition Status:    Severe malnutrition in the context of acute illness (worsened)    Recommendations already ordered by Registered Dietitian (RD):  - Trial Nepro oral nutrition supplement (lower in potassium); allow pt to order PRN if requested if likes  - Continue Special K protein bar  - Continue to encourage PO intake, high protein foods  - Continue daily multivitamin as ordered     Future/Additional Recommendations:  Continue to monitor nutrition-related findings and follow pt per protocol     EVALUATION OF THE PROGRESS TOWARD GOALS   Diet: High Kcal/High Protein, 3 gm K+, high consistent CHO, 2 gm Na   Supplements: Vanilla Ensure Plus and Special K protein bar (+ allow additional supplements PRN if requested)    PO Intake: Intake was previously good with 100% consistently documented to I/O; however, this changed ~2/20 with intake now consistently documented as 0% with comment poor appetite. Per review of room service orders, has recently only been ordering 1-2 meals per day.     Pt acknowledges decrease to PO intake. Reports generally feeling more unwell this week compared to last week but states that today is going OK so far and was eating a cookie/drinking milk at the time of the assessment. He would like to keep the Special K protein bar in place and also reports liking the Ensure as ordered, though writer suggested that with his current low potassium diet he may want to consider trying Nepro, as it is higher in Kcals and low potassium. Pt agreed to let writer send to room once for him to try and will order in future PRN if likes. Monitor for improvement to PO Intake.      NEW FINDINGS   GI:    1-4 unmeasured BMs per day over past week, per I/O; however 11 unmeasured BMs on 2/21.      Weights:    Trending up since admission; likely confounded by fluid status.      Labs:    Reviewed   Electrolytes  Potassium (mmol/L)   Date Value   02/24/2023 3.8   02/23/2023 4.0   02/23/2023 4.3   02/11/2023 5.5 (H)   01/25/2023 4.6   01/24/2023 4.9     Potassium POCT (mmol/L)   Date Value   02/12/2023 4.3     Phosphorus (mg/dL)   Date Value   02/12/2023 4.6 (H)   01/28/2023 2.5   01/27/2023 2.3 (L)   01/26/2023 3.3   01/26/2023 3.7    Blood Glucose  Glucose (mg/dL)   Date Value   02/24/2023 112 (H)   02/23/2023 196 (H)   02/11/2023 216 (H)   01/25/2023 259 (H)   01/24/2023 150 (H)   01/22/2023 175 (H)   01/21/2023 179 (H)     GLUCOSE BY METER POCT (mg/dL)   Date Value   02/24/2023 133 (H)   02/24/2023 83   02/24/2023 144 (H)   02/24/2023 206 (H)   02/23/2023 257 (H)     Hemoglobin A1C (%)   Date Value   01/28/2023 5.6   07/28/2022 5.4   04/07/2022 7.4 (H)    Inflammatory Markers  WBC Count (10e3/uL)   Date Value   02/24/2023 8.5   02/23/2023 12.2 (H)   02/22/2023 13.3 (H)     Albumin (g/dL)   Date Value   02/24/2023 4.2   02/23/2023 4.5   02/22/2023 4.6   02/11/2023 3.6   01/25/2023 3.0 (L)   01/24/2023 2.9 (L)      Magnesium (mg/dL)   Date Value   02/12/2023 1.6 (L)   01/28/2023 2.0   01/27/2023 1.6 (L)     Sodium (mmol/L)   Date Value   02/24/2023 134 (L)   02/23/2023 131 (L)   02/23/2023 127 (L)    Renal  Urea Nitrogen (mg/dL)   Date Value   02/24/2023 90.3 (H)   02/23/2023 88.1 (H)   02/23/2023 90.1 (H)   02/11/2023 33 (H)   01/25/2023 29 (H)   01/24/2023 32 (H)     Creatinine (mg/dL)   Date Value   02/24/2023 1.74 (H)   02/23/2023 1.88 (H)   02/23/2023 2.05 (H)     Additional  Ketones Urine (mg/dL)   Date Value   02/22/2023 Negative        Medications:    Bumex    Folvite    Medium sliding scale insulin TID before meals and at HS     Custom insulin also ordered with meals; see MAR (current 1 unit per 10 gm CHO)     Lactulose BID     Thera-Vit    Thiamine/B1 100 mg/day    Skin:    WOCN not following      MALNUTRITION  % Intake: < 75% for > 7 days (moderate)  % Weight Loss: Unable to assess (likely confounded by fluid status)  Subcutaneous Fat Loss: Facial region:  moderate and Lower arm:  moderate   Muscle Loss: Temporal:  moderate, Thoracic region (clavicle, acromium bone, deltoid, trapezius, pectoral):  moderate, Dorsal hand:  moderate, Patellar region:  severe and Posterior calf:  severe  Fluid Accumulation/Edema: Moderate  Malnutrition Diagnosis: Severe malnutrition in the context of acute illness     Previous Goals   Patient to consume % of nutritionally adequate meal trays TID, or the equivalent with supplements/snacks.  Evaluation: Not met    Previous Nutrition Diagnosis  Increased nutrient needs  related to catabolic disease state as evidenced by liver cirrhosis, increased calorie and protein needs.     Evaluation: No change     CURRENT NUTRITION DIAGNOSIS  Inadequate oral intake related to acute decline to appetite, prolonged LOS, and catabolic disease increasing nutrient needs as evidenced by pt meeting severe malnutrition criteria based on decline to intake and body habitus.     INTERVENTIONS  Implementation  Medical food supplement therapy  Nutrition education for nutrition relationship to health/disease    Goals  Patient to consume % of nutritionally adequate meal trays TID, or the equivalent with supplements/snacks.    Monitoring/Evaluation  Progress toward goals will be monitored and evaluated per protocol.    Charlie Hester RDN, LD, CNSC  6B RD pager: [phone]   6B work-room RD phone: *[phone]    Weekend/Holiday RD pager [phone]

## 2023-02-25 ENCOUNTER — ANESTHESIA (OUTPATIENT)
Dept: SURGERY | Facility: CLINIC | Age: 28
DRG: 005 | End: 2023-02-25
Payer: COMMERCIAL

## 2023-02-25 LAB
ALBUMIN SERPL BCG-MCNC: 4 G/DL (ref 3.5–5.2)
ALP SERPL-CCNC: 102 U/L (ref 40–129)
ALT SERPL W P-5'-P-CCNC: 40 U/L (ref 10–50)
ANION GAP SERPL CALCULATED.3IONS-SCNC: 11 MMOL/L (ref 7–15)
AST SERPL W P-5'-P-CCNC: 77 U/L (ref 10–50)
BILIRUB SERPL-MCNC: 7.4 MG/DL
BLD PROD TYP BPU: NORMAL
BLOOD COMPONENT TYPE: NORMAL
BUN SERPL-MCNC: 77.4 MG/DL (ref 6–20)
CALCIUM SERPL-MCNC: 9 MG/DL (ref 8.6–10)
CHLORIDE SERPL-SCNC: 92 MMOL/L (ref 98–107)
CODING SYSTEM: NORMAL
CREAT SERPL-MCNC: 1.24 MG/DL (ref 0.67–1.17)
CROSSMATCH: NORMAL
DEPRECATED HCO3 PLAS-SCNC: 34 MMOL/L (ref 22–29)
ERYTHROCYTE [DISTWIDTH] IN BLOOD BY AUTOMATED COUNT: 20.8 % (ref 10–15)
GFR SERPL CREATININE-BSD FRML MDRD: 81 ML/MIN/1.73M2
GLUCOSE BLDC GLUCOMTR-MCNC: 102 MG/DL (ref 70–99)
GLUCOSE BLDC GLUCOMTR-MCNC: 109 MG/DL (ref 70–99)
GLUCOSE BLDC GLUCOMTR-MCNC: 222 MG/DL (ref 70–99)
GLUCOSE BLDC GLUCOMTR-MCNC: 254 MG/DL (ref 70–99)
GLUCOSE BLDC GLUCOMTR-MCNC: 280 MG/DL (ref 70–99)
GLUCOSE BLDC GLUCOMTR-MCNC: 76 MG/DL (ref 70–99)
GLUCOSE SERPL-MCNC: 97 MG/DL (ref 70–99)
HBV CORE AB SERPL QL IA: NONREACTIVE
HBV SURFACE AB SERPL IA-ACNC: >1000 M[IU]/ML
HBV SURFACE AB SERPL IA-ACNC: REACTIVE M[IU]/ML
HBV SURFACE AG SERPL QL IA: NONREACTIVE
HCT VFR BLD AUTO: 21 % (ref 40–53)
HCV AB SERPL QL IA: NONREACTIVE
HGB BLD-MCNC: 6.8 G/DL (ref 13.3–17.7)
HIV 1+2 AB+HIV1 P24 AG SERPL QL IA: NONREACTIVE
INR PPP: 1.79 (ref 0.85–1.15)
ISSUE DATE AND TIME: NORMAL
MCH RBC QN AUTO: 31.9 PG (ref 26.5–33)
MCHC RBC AUTO-ENTMCNC: 32.4 G/DL (ref 31.5–36.5)
MCV RBC AUTO: 99 FL (ref 78–100)
PLATELET # BLD AUTO: 106 10E3/UL (ref 150–450)
POTASSIUM SERPL-SCNC: 3.8 MMOL/L (ref 3.4–5.3)
PROT SERPL-MCNC: 6.2 G/DL (ref 6.4–8.3)
RBC # BLD AUTO: 2.13 10E6/UL (ref 4.4–5.9)
SODIUM SERPL-SCNC: 137 MMOL/L (ref 136–145)
UNIT ABO/RH: NORMAL
UNIT NUMBER: NORMAL
UNIT STATUS: NORMAL
UNIT TYPE ISBT: 600
WBC # BLD AUTO: 6.4 10E3/UL (ref 4–11)

## 2023-02-25 PROCEDURE — 99207 PR NO CHARGE LOS: CPT | Performed by: TRANSPLANT SURGERY

## 2023-02-25 PROCEDURE — 99233 SBSQ HOSP IP/OBS HIGH 50: CPT | Mod: GC | Performed by: INTERNAL MEDICINE

## 2023-02-25 PROCEDURE — 250N000013 HC RX MED GY IP 250 OP 250 PS 637

## 2023-02-25 PROCEDURE — 120N000003 HC R&B IMCU UMMC

## 2023-02-25 PROCEDURE — 85027 COMPLETE CBC AUTOMATED: CPT

## 2023-02-25 PROCEDURE — P9016 RBC LEUKOCYTES REDUCED: HCPCS

## 2023-02-25 PROCEDURE — 250N000013 HC RX MED GY IP 250 OP 250 PS 637: Performed by: STUDENT IN AN ORGANIZED HEALTH CARE EDUCATION/TRAINING PROGRAM

## 2023-02-25 PROCEDURE — 36415 COLL VENOUS BLD VENIPUNCTURE: CPT

## 2023-02-25 PROCEDURE — 80053 COMPREHEN METABOLIC PANEL: CPT

## 2023-02-25 PROCEDURE — 85610 PROTHROMBIN TIME: CPT

## 2023-02-25 PROCEDURE — 99232 SBSQ HOSP IP/OBS MODERATE 35: CPT | Performed by: INTERNAL MEDICINE

## 2023-02-25 PROCEDURE — 250N000013 HC RX MED GY IP 250 OP 250 PS 637: Performed by: INTERNAL MEDICINE

## 2023-02-25 RX ORDER — CARVEDILOL 6.25 MG/1
6.25 TABLET ORAL 2 TIMES DAILY WITH MEALS
Status: DISCONTINUED | OUTPATIENT
Start: 2023-02-25 | End: 2023-02-28

## 2023-02-25 RX ORDER — BUMETANIDE 0.25 MG/ML
2 INJECTION INTRAMUSCULAR; INTRAVENOUS DAILY
Status: DISCONTINUED | OUTPATIENT
Start: 2023-02-25 | End: 2023-02-26

## 2023-02-25 RX ADMIN — RIFAXIMIN 550 MG: 550 TABLET ORAL at 20:14

## 2023-02-25 RX ADMIN — FLUOXETINE 60 MG: 20 CAPSULE ORAL at 23:59

## 2023-02-25 RX ADMIN — THERA TABS 1 TABLET: TAB at 08:41

## 2023-02-25 RX ADMIN — RIFAXIMIN 550 MG: 550 TABLET ORAL at 08:41

## 2023-02-25 RX ADMIN — THIAMINE HCL TAB 100 MG 100 MG: 100 TAB at 08:41

## 2023-02-25 RX ADMIN — LACTULOSE 20 G: 10 POWDER, FOR SOLUTION ORAL at 20:25

## 2023-02-25 RX ADMIN — OXYCODONE HYDROCHLORIDE 5 MG: 5 TABLET ORAL at 23:55

## 2023-02-25 RX ADMIN — PANTOPRAZOLE SODIUM 40 MG: 40 TABLET, DELAYED RELEASE ORAL at 20:14

## 2023-02-25 RX ADMIN — INSULIN ASPART 8 UNITS: 100 INJECTION, SOLUTION INTRAVENOUS; SUBCUTANEOUS at 21:10

## 2023-02-25 RX ADMIN — FOLIC ACID 1 MG: 1 TABLET ORAL at 08:41

## 2023-02-25 RX ADMIN — LACTULOSE 20 G: 10 POWDER, FOR SOLUTION ORAL at 08:40

## 2023-02-25 RX ADMIN — OXYCODONE HYDROCHLORIDE 5 MG: 5 TABLET ORAL at 18:33

## 2023-02-25 RX ADMIN — CARVEDILOL 6.25 MG: 6.25 TABLET, FILM COATED ORAL at 18:26

## 2023-02-25 RX ADMIN — PANTOPRAZOLE SODIUM 40 MG: 40 TABLET, DELAYED RELEASE ORAL at 08:41

## 2023-02-25 ASSESSMENT — ACTIVITIES OF DAILY LIVING (ADL)
ADLS_ACUITY_SCORE: 37
ADLS_ACUITY_SCORE: 37
ADLS_ACUITY_SCORE: 39
ADLS_ACUITY_SCORE: 37
ADLS_ACUITY_SCORE: 39
ADLS_ACUITY_SCORE: 37

## 2023-02-25 NOTE — PROVIDER NOTIFICATION
Time of notification: 7:31 PM  Provider notified:Pattie Sofya Bryce cover As[employer]  #[id number], for Pre-op orders.  Patient status:Pt is eating with mom in room.  Start pre-op check list. Pharmacy call with possible OR time of 4 AM.  Temp:  [97.3  F (36.3  C)-98.5  F (36.9  C)] 98.2  F (36.8  C)  Pulse:  [80-94] 93  Resp:  [14-18] 16  BP: (120-148)/(58-89) 132/66  Cuff Mean (mmHg):  [90] 90  SpO2:  [97 %-99 %] 99 %  Orders received: awaiting orders

## 2023-02-25 NOTE — TELEPHONE ENCOUNTER
Organ Offer Encounter Information    Organ Offer Information  Organ offer date & time: 2/24/2023  4:49 PM  Coordinator/Fellow/Attending name: Gema Hunter RN   Organ(s):  Organ UNOS ID Match Run ID Comment Organ Laterality   Liver [id number]       Organ disposition: Case Cancelled - Donor quality - Other (specify)  Additional Comments: 2/24/2023 4:50 PM  Liver: Import, DBD  MD: Alcon/Kenny  OPO Contact: Alejandra [phone]  Donor/Recip HCV Status: Donor NEG  (HCV+ Donors - Discuss HCV genotyping/quant testing with MD & send Epic in basket message to [facility] HCV POOL - Include Donor UNOS ID)  Donor Nutritional Status: Dextrose in IVF  Donor Meets Risk Criteria for HIV/HCV/HBV: Yes  Plan: Patient currently hospitalized.  MD to speak to patient and/or family.  [facility] start [facility], aiming for 0001 donor OR (not confirmed yet) with local recovery.  Linda Hunter RN   Transplant Coordinator    February 24, 2023 5:43 PM  Admissions: Currently admitted  Unit: [address] [place] [address] notified  Update Provider Entering Orders (XM Plan & COVID Testing):  1756 - SHERMAN, STACEYD - Does not need XM, does need COVID testing  Immunology: 1745 - Danielle notified, does not need XM  Inpatient Lab (COVID Testing [phone], Option 2): 1746 - Tali notified of pre-op testing  Linda Hunter RN   Transplant Coordinator      February 24, 2023 6:45 PM  Donor OR Time: 2100  Procuring MD: Braulio Pabon  Contact in the OR: Alejandra  Organs Being Procured: LI/KI  Flush Solution: UW  Biopsy: Requested intra-op wedge and needle biopsy of both lobes  Pump: No  Special Requests (Special blood tubes, nodes, waivers): No  MD for Visualization: Alcon  Transportation Details: [employer] to have a plane in position to  liver at midnight  - - -  Book OR: 1903 - Booked for 0300, Luz Elena  Vessel Storage Confirmation (PA/REGINALD/FABIENNE): Ok to bank, confirmed with Luz Elena  Blood Bank:  1912 - Jovani notified  Research: ON HOLD  TransNet/ABO Verification: 1849 - Done, confirmed received by Luz Elena Crowley Organ: 1849 - Done  Linda Hunter RN   Transplant Coordinator    February 24, 2023 9:50 PM  Call from Dr. Rondon who received a call from Alejandra at [facility].  Donor too unstable for liver biopsy, has not had O2 sat for four hours.  Dr. Rondon is declining the liver.  Spoke to Karen at [facility] to cancel sending pilots.  Cancelled OR, updated blood bank and immunology.  Updated Brandi on 6B.  MD to speak to patient.  Organ removed from record.  Linda Hunter RN   Transplant Coordinator          Attestation I have discussed all of the above with the Patient/Legal Guardian/Caregiver regarding this organ offer.: Yes  Coordinator/Fellow/Attending name: Gema Hunter, CONI

## 2023-02-25 NOTE — PROGRESS NOTES
Nephrology Progress Note  02/25/2023         Assessment & Recommendations:   # ASHISH likely due to congestion   # Hyperkalemia  # Hypochloremia  #Hypervolumia gaining weight, has not gotten diuretic since the 2/18, echo concerning for volume overload,Edema  # Cirrhosis  #Compensated Respiratory acidosis: PH of 7.38/54/32  Dillon Salter is a 27 year old male with history of Asperger's, alcoholic cirrhosis c/b esophageal varices and hepatic encephalopathy currently undergoing liver transplant evaluation at [facility], DM II who was directly admitted from [facility] for hematemesis, decompensated cirrhosis and hepatic encephalopathy.      Patient creatinine is improving responding to diuresis     Recommendations:  - Hold IV bumex 3 mg today due to increase in UOP and increase in bicarb. However, ok to use again if weight increases or UOP decreases  -Will reassess the patient tomorrow for diuretic needs please touch base with nephrology.With creatinine improving might require less diuretics tomorrow  - Accurate I/O's  -Daily standing weight  -discontinue lokelma  - Monitor BMP   Recommendations were communicated to primary team via verbally    Seen and discussed with Dr. Irineo Valenzuela MD   Division of Renal Disease and Hypertension  [facility]  [employer] Web Console    Interval History :   Nursing and provider notes from last 24 hours reviewed.  In the last 24 hours Dillon Salter responded to IV bumex 3 mg and now UOP good this am    Review of Systems:   I reviewed the following systems:  GI: Improve appetite. No nausea or vomiting or diarrhea.   Neuro:  No confusion  Constitutional:  No fever or chills  CV: positive dyspnea or edema.  No chest pain.    Physical Exam:   I/O last 3 completed shifts:  In: -   Out: 1550 [Urine:1550]   /73   Pulse 85   Temp 98.5  F (36.9  C)   Resp 20   Wt 78.2 kg (172 lb 6.4 oz)   SpO2 92%   BMI 28.69 kg/m       GENERAL APPEARANCE: Patient is not in  distress  EYES:  Positive scleral icterus, pupils equal  PULM: Crackles in bilateral lower lung bases, equal air movement, no clubbing  CV: Patient has regular rhythm, normal rate, no rub     -JVD -ve     -edema +2  GI: soft, non tender, non distended, bowel sounds are present  INTEGUMENT: no cyanosis, no rash  NEURO:  AOx3, grossly nonfocal, moves all extremities spontaneously  Access none    Labs:   All labs reviewed by me  Electrolytes/Renal -   Recent Labs   Lab Test 02/25/23  0836 02/25/23  0554 02/25/23  0439 02/24/23  1738 02/24/23  1718 02/24/23  0739 02/24/23  0510 02/12/23  0839 02/12/23  0811 01/28/23  0602 01/28/23  0541   NA  --  137  --   --  136  136  --  134*   < >  --    < > 129*   POTASSIUM  --  3.8  --   --  3.7  3.7  --  3.8   < > 6.3*   < > 4.9   CHLORIDE  --  92*  --   --  92*  92*  --  92*   < >  --    < > 97*   CO2  --  34*  --   --  32*  32*  --  30*   < >  --    < > 24   BUN  --  77.4*  --   --  84.1*  84.1*  --  90.3*   < >  --    < > 19.7   CR  --  1.24*  --   --  1.52*  1.52*  --  1.74*   < >  --    < > 0.89   GLC 76 97 109*   < > 217*  217*   < > 112*   < >  --    < > 266*   SARTHAK  --  9.0  --   --  9.2  9.2  --  9.0   < >  --    < > 8.0*   MAG  --   --   --   --  3.8*  --   --   --  1.6*  --  2.0   PHOS  --   --   --   --  4.0  --   --   --  4.6*  --  2.5    < > = values in this interval not displayed.       CBC -   Recent Labs   Lab Test 02/25/23  0554 02/24/23  1718 02/24/23  0510   WBC 6.4 7.3 8.5   HGB 6.8* 7.0* 7.3*   * 120* 114*       LFTs -   Recent Labs   Lab Test 02/25/23  0554 02/24/23  1718 02/24/23  0510   ALKPHOS 102 104 107   BILITOTAL 7.4* 8.0* 8.0*   ALT 40 37 32   AST 77* 70* 59*   PROTTOTAL 6.2* 6.4 6.3*   ALBUMIN 4.0 4.2 4.2       Iron Panel -   Recent Labs   Lab Test 02/22/23  0610 02/16/23  0543 12/20/22  0703 10/06/22  0958 04/07/22  0624   IRON 26*  --  249*  --  85   IRONSAT 19  --   --   --   --    ASCENCION 1,465*   < > 2,207*   < > 423*    < > = values  in this interval not displayed.         Imaging:  All imaging studies reviewed by me.     Current Medications:    [Held by provider] bumetanide  2 mg Intravenous Daily     fibrinogen concentrate (Human)  1 g Intravenous Once     fibrinogen concentrate (Human)  1 g Intravenous Once     FLUoxetine  60 mg Oral At Bedtime     folic acid  1 mg Oral Daily     insulin aspart  1-7 Units Subcutaneous TID AC     insulin aspart  1-5 Units Subcutaneous At Bedtime     insulin aspart   Subcutaneous Daily with breakfast     insulin aspart   Subcutaneous Daily with lunch     insulin aspart   Subcutaneous Daily with supper     insulin glargine  10 Units Subcutaneous BID     lactulose  20 g Oral BID     multivitamin, therapeutic  1 tablet Per Feeding Tube Daily     pantoprazole  40 mg Oral BID     prothrombin 4 factor complex concentrate  1,126 Units Intravenous Once     prothrombin 4 factor complex concentrate  518 Units Intravenous Once     prothrombin 4 factor complex concentrate  547 Units Intravenous Once     rifaximin  550 mg Oral BID     sodium chloride (PF)  3 mL Intracatheter Q8H     [Held by provider] spironolactone  100 mg Oral At Bedtime     thiamine  100 mg Oral Daily       - MEDICATION INSTRUCTIONS -       dextrose       EPINEPHrine       fentanyl       norepinephrine       vasopressin         I was present with the fellow during the history and exam.  I discussed the case with the fellow and agree with the findings as documented in the assessment and plan.    Khushbu Cabello MD   of Medicine  Department of Nephrology  [facility]

## 2023-02-25 NOTE — TELEPHONE ENCOUNTER
"TRANSPLANT OR REPORT    Organ: LI  Laterality (if known): N/A  Organ Location: Import    UNOS ID: [id number]  Donor OR Time: 2100  Expected/Actual Cross Clamp Time: 2200  Expected Organ Arrival Time: 0300    Surgeon: Alcon  Time in OR: 0300  Time in 3C (N/A for LI): N/A    Recipient Details  Admission ETA: Currently admitted   Unit: 6B  Isolation: No  Latex Allergy: No  : No  Diagnosis: Cirrhosis    Liver Transplants  Bypass/Perfusion: Yes  Cellsaver: Yes  Hemodialysis: No  ~ \"RENAL STAFF TEACHING SERVICE MEDICINE\" : Remind LI Fellow to discuss with nephrology on call.  ~ CRRT Resource Nurse: N/A  (Telephone Number for CRRT [phone])    Kidney/Panc Transplants  XM Status (Need to wait for XM?): No    Liver or KP/PA Recipients - Vessel Banking:  Donor has positive serologies for HIV/HCV/HBV: No  Donor has risk criteria for HIV/HCV/HBV: Yes      Transplant Coordinator Contact Info: Linda Cruz      Vessel Bank Information  Transplant hospitals must not store a donor s extra vessels if the donor has tested positive for any of the following:   - HIV by antibody, antigen, or nucleic acid test (AMBER)   - Hepatitis B surface antigen (HBsAg)   - Hepatitis B (HBV) by AMBER   - Hepatitis C (HCV) by antibody or AMBER     Extra vessels from donors that do not test positive for HIV, HBV, or HCV as above may be stored      "

## 2023-02-25 NOTE — ANESTHESIA PREPROCEDURE EVALUATION
Anesthesia Pre-Procedure Evaluation    Patient: Dillon Salter   MRN: [id number] : 1995        Procedure : Procedure(s):  Transplant liver recipient  donor          Past Medical History:   Diagnosis Date     Acute on chronic anemia 2022     Alcohol use disorder      Alcohol use disorder, severe, dependence (H) 2022     Alcoholic cirrhosis of liver with ascites (H)      Alcoholic hepatitis      Autism spectrum      Autism spectrum disorder 2022    High functioning autistic.  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  lives with parents (Leticia and Wes) socially isolated drinks alcohol beer daily      Benign essential hypertension      Bleeding esophageal varices (H) 2022     Hepatic encephalopathy      Hyponatremia 2022     Major depressive disorder      Type II Diabetes       Past Surgical History:   Procedure Laterality Date     COLONOSCOPY N/A 2023    Procedure: Colonoscopy;  Surgeon: Osman Montoya MD;  Location: [facility] OR     ENDOSCOPIC ULTRASOUND LOWER GASTROINTESTIONAL TRACT (GI) N/A 2023    Procedure: ULTRASOUND, LOWER GI TRACT, ENDOSCOPIC with glueing;  Surgeon: Osman Montoya MD;  Location: [facility] OR     ESOPHAGOSCOPY, GASTROSCOPY, DUODENOSCOPY (EGD), COMBINED N/A 2022    Procedure: ESOPHAGOGASTRODUODENOSCOPY (EGD) with esophageal banding;  Surgeon: Gary Hurst MD;  Location: [facility] Main OR     ESOPHAGOSCOPY, GASTROSCOPY, DUODENOSCOPY (EGD), COMBINED N/A 2022    Procedure: ESOPHAGOGASTRODUODENOSCOPY;  Surgeon: Gavino Reza MD;  Location: [facility] Main OR     ESOPHAGOSCOPY, GASTROSCOPY, DUODENOSCOPY (EGD), COMBINED N/A 2023    Procedure: ESOPHAGOGASTRODUODENOSCOPY (EGD) with esophageal variceal banding;  Surgeon: Juan Boo MD;  Location: [facility] Main OR     ESOPHAGOSCOPY, GASTROSCOPY,  DUODENOSCOPY (EGD), COMBINED N/A 1/25/2023    Procedure: ESOPHAGOGASTRODUODENOSCOPY (EGD);  Surgeon: Juan Boo MD;  Location: [facility] Main OR     ESOPHAGOSCOPY, GASTROSCOPY, DUODENOSCOPY (EGD), COMBINED N/A 1/27/2023    Procedure: Esophagoscopy, gastroscopy, duodenoscopy (EGD), combined;  Surgeon: Osman Montoya MD;  Location: [facility] OR     ESOPHAGOSCOPY, GASTROSCOPY, DUODENOSCOPY (EGD), COMBINED N/A 2/12/2023    Procedure: ESOPHAGOGASTRODUODENOSCOPY (EGD);  Surgeon: Leventhal, Thomas Michael, MD;  Location: [facility] OR     MIDLINE DOUBLE LUMEN PLACEMENT  5/26/2022          PICC TRIPLE LUMEN PLACEMENT  7/28/2022           No Known Allergies   Social History     Tobacco Use     Smoking status: Former     Smokeless tobacco: Never     Tobacco comments:     A pack lasted a year, hardly smoked   Substance Use Topics     Alcohol use: Not Currently     Comment: No ETOH since 4/6/22      Wt Readings from Last 1 Encounters:   02/23/23 78.2 kg (172 lb 6.4 oz)        Anesthesia Evaluation   Pt has had prior anesthetic.         ROS/MED HX  ENT/Pulmonary:  - neg pulmonary ROS     Neurologic: Comment: Asperger's.      Cardiovascular:     (+) hypertension-----Previous cardiac testing   Echo: Date: 2/20/23 Results:  Interpretation Summary  Normal dobutamine stress echocardiogram without evidence of inducible  ischemia. Target heart rate was achieved. Heart rate and blood pressure  response to dobutamine were normal. Normal LV function and wall motion at  rest. With stress, the left ventricular ejection fraction increased from 55-  60% to greater than 65% and the left ventricular size decreased appropriately.  No regional wall motion abnormality with stress.  No subjective symptoms to suggest ischemia.  There was no ECG evidence of ischemia.  No significant valve disease on screening doppler evaluation. The aortic root  and visualized ascending aorta are normal.  Stress Test: Date: Results:    ECG Reviewed: Date: Results:    Cath:  Date: Results:      METS/Exercise Tolerance:     Hematologic: Comments: Hemoglobin of 7.0  Platelets 120k  INR 1.79    (+) anemia,     Musculoskeletal:       GI/Hepatic: Comment: Alcohol related cirrhosis c/b hepatic encephalopathy, history of esophageal varices bleeding (5/2022, 1/2023) and rectal varices s/p sclerotherapy 1/2023, ascites presenting with hematemesis.  MELD-Na 27.  Underwent EGD 2/11 showed oozing portal hypertensive gastropathy.  Malnutrition.    (+) hepatitis type Alcoholic, liver disease,     Renal/Genitourinary: Comment: ASHISH non-oliguric.    (+) renal disease,     Endo:     (+) type II DM,     Psychiatric/Substance Use:  - neg psychiatric ROS     Infectious Disease:  - neg infectious disease ROS     Malignancy:  - neg malignancy ROS     Other:               OUTSIDE LABS:  CBC:   Lab Results   Component Value Date    WBC 7.3 02/24/2023    WBC 8.5 02/24/2023    HGB 7.0 (L) 02/24/2023    HGB 7.3 (L) 02/24/2023    HCT 21.4 (L) 02/24/2023    HCT 21.6 (L) 02/24/2023     (L) 02/24/2023     (L) 02/24/2023     BMP:   Lab Results   Component Value Date     02/24/2023     02/24/2023    POTASSIUM 3.7 02/24/2023    POTASSIUM 3.7 02/24/2023    CHLORIDE 92 (L) 02/24/2023    CHLORIDE 92 (L) 02/24/2023    CO2 32 (H) 02/24/2023    CO2 32 (H) 02/24/2023    BUN 84.1 (H) 02/24/2023    BUN 84.1 (H) 02/24/2023    CR 1.52 (H) 02/24/2023    CR 1.52 (H) 02/24/2023     (H) 02/24/2023     (H) 02/24/2023     (H) 02/24/2023     COAGS:   Lab Results   Component Value Date    PTT 47 (H) 02/24/2023    INR 1.79 (H) 02/24/2023    FIBR 239 02/24/2023     POC: No results found for: BGM, HCG, HCGS  HEPATIC:   Lab Results   Component Value Date    ALBUMIN 4.2 02/24/2023    PROTTOTAL 6.4 02/24/2023    ALT 37 02/24/2023    AST 70 (H) 02/24/2023    ALKPHOS 104 02/24/2023    BILITOTAL 8.0 (H) 02/24/2023    DESI 49 02/22/2023     OTHER:   Lab Results   Component Value Date    LACT 0.8  02/24/2023    A1C 5.6 01/28/2023    SARTHAK 9.2 02/24/2023    SARTHAK 9.2 02/24/2023    PHOS 4.0 02/24/2023    MAG 3.8 (H) 02/24/2023    LIPASE <9 02/11/2023    AMYLASE 14 (L) 02/24/2023    TSH 1.09 07/30/2022       Anesthesia Plan    ASA Status:  4, emergent    NPO Status:  ELEVATED Aspiration Risk/Unknown    Anesthesia Type: General.     - Airway: ETT   Induction: Intravenous, Propofol, RSI.   Maintenance: Balanced.   Techniques and Equipment:     - Lines/Monitors: 2nd IV, Arterial Line, Central Line, PAC, CVP, BIS     - Blood: Blood in Room, PRBC, FFP, PLT     - Drips/Meds: Fentanyl, Norepi, Vasopressin, Epinephrine     Consents    Anesthesia Plan(s) and associated risks, benefits, and realistic alternatives discussed. Questions answered and patient/representative(s) expressed understanding.    - Discussed:     - Discussed with:  Patient      - Extended Intubation/Ventilatory Support Discussed: Yes.      - Patient is DNR/DNI Status: No    Use of blood products discussed: Yes.     - Discussed with: Patient.     Postoperative Care    Pain management: IV analgesics, Multi-modal analgesia.   PONV prophylaxis: Background Propofol Infusion     Comments:                Caesar Connor Junior, MD

## 2023-02-25 NOTE — PLAN OF CARE
"  Neuro/Musculoskeletal:  A&Ox4. lethargic  Cardiac:  SR.  VSS.   Respiratory:  SPO2>90, placed on O2 at 1.5 LPM while sleeping for occasional dips.  GI/:  Adequate urine output.  BM 1x soft and formed  Diet/Appetite: On high protein, Low K, Low Na consistent carb diet with reported poor appetite.  Activity:  Assist of 1 using GB and walker.  Pain:  Denies.  Skin: Jaundiced. Multiple scattered bruise.  LDAs + Drips/IVF:   Bilateral PIV L & R on saline lock  Protocols/Labs:   Hgb 6.8    Pertinent Shift Updates: Patient and family are frustrated with the cancelled transplant.    Plan:  Continue POC, Notify provider for changes            Problem: Plan of Care - These are the overarching goals to be used throughout the patient stay.    Goal: Patient-Specific Goal (Individualized)  Description: You can add care plan individualizations to a care plan. Examples of Individualization might be:  \"Parent requests to be called daily at 9am for status\", \"I have a hard time hearing out of my right ear\", or \"Do not touch me to wake me up as it startles me\".  Outcome: Not Progressing     Goal Outcome Evaluation:           Overall Patient Progress: no change           "

## 2023-02-25 NOTE — PROVIDER NOTIFICATION
Time of notification: 9:04 AM  Provider notified: Rupali Lopez  Patient status: can we change the diet order to reg ?  Orders received: back to regular diet order    Time of notification: 12:21 PM  Provider notified: Rupali Lopez  Patient status: blood is done, do you want a recheck or is AM okay?  Orders received: AM recheck

## 2023-02-25 NOTE — PROGRESS NOTES
Nephrology Progress Note  02/24/2023         Assessment & Recommendations:   # ASHISH likely due to congestion   # Hyperkalemia  # Hypochloremia  #Hypervolumia gaining weight, has not gotten diuretic since the 2/18, echo concerning for volume overload,Edema  # Cirrhosis  #Compensated Respiratory acidosis: PH of 7.38/54/32  Dillon Salter is a 27 year old male with history of Asperger's, alcoholic cirrhosis c/b esophageal varices and hepatic encephalopathy currently undergoing liver transplant evaluation at [facility], DM II who was directly admitted from [facility] for hematemesis, decompensated cirrhosis and hepatic encephalopathy.      Patient creatinine is improving responding to diuresis     Recommendations:  - Continue IV bumex 3 mg bid  For today  -Will reassess the patient tomorrow for diuretic needs please touch base with nephrology.With creatinine improving might require less diuretics tomorrow  - Accurate I/O's  -Daily standing weight  -discontinue lokelma  - Monitor BMP   Recommendations were communicated to primary team via verbally    Seen and discussed with Dr. Irineo Medina MD   Division of Renal Disease and Hypertension  [facility]  [employer] Web Console    Interval History :   Nursing and provider notes from last 24 hours reviewed.  In the last 24 hours Dillon Salter responding to IV bumex 3 mg  Review of Systems:   I reviewed the following systems:  GI: Improve appetite. No nausea or vomiting or diarrhea.   Neuro:  No confusion  Constitutional:  No fever or chills  CV: positive dyspnea or edema.  No chest pain.    Physical Exam:   I/O last 3 completed shifts:  In: 425 [P.O.:425]  Out: 575 [Urine:575]   /75 (BP Location: Left arm)   Pulse 90   Temp 98.6  F (37  C) (Oral)   Resp 18   Wt 78.2 kg (172 lb 6.4 oz)   SpO2 (!) 88%   BMI 28.69 kg/m       GENERAL APPEARANCE: Patient is not in distress  EYES:  Positive scleral icterus, pupils equal  PULM: Crackles in bilateral  lower lung bases, equal air movement, no clubbing  CV: Patient has regular rhythm, normal rate, no rub     -JVD -ve     -edema +2  GI: soft, non tender, non distended, bowel sounds are present  INTEGUMENT: no cyanosis, no rash  NEURO:  AOx3, grossly nonfocal, moves all extremities spontaneously  Access none    Labs:   All labs reviewed by me  Electrolytes/Renal -   Recent Labs   Lab Test 02/24/23 2034 02/24/23  1738 02/24/23  1718 02/24/23  0739 02/24/23  0510 02/23/23 2002 02/23/23  1803 02/12/23  0839 02/12/23  0811 01/28/23  0602 01/28/23  0541   NA  --   --  136  136  --  134*  --  131*   < >  --    < > 129*   POTASSIUM  --   --  3.7  3.7  --  3.8  --  4.0   < > 6.3*   < > 4.9   CHLORIDE  --   --  92*  92*  --  92*  --  89*   < >  --    < > 97*   CO2  --   --  32*  32*  --  30*  --  28   < >  --    < > 24   BUN  --   --  84.1*  84.1*  --  90.3*  --  88.1*   < >  --    < > 19.7   CR  --   --  1.52*  1.52*  --  1.74*  --  1.88*   < >  --    < > 0.89   * 213* 217*  217*   < > 112*   < > 196*   < >  --    < > 266*   SARTHAK  --   --  9.2  9.2  --  9.0  --  9.4   < >  --    < > 8.0*   MAG  --   --  3.8*  --   --   --   --   --  1.6*  --  2.0   PHOS  --   --  4.0  --   --   --   --   --  4.6*  --  2.5    < > = values in this interval not displayed.       CBC -   Recent Labs   Lab Test 02/24/23 1718 02/24/23  0510 02/23/23  1100   WBC 7.3 8.5 12.2*   HGB 7.0* 7.3* 8.1*   * 114* 133*       LFTs -   Recent Labs   Lab Test 02/24/23  1718 02/24/23  0510 02/23/23  1100   ALKPHOS 104 107 101   BILITOTAL 8.0* 8.0* 10.2*   ALT 37 32 34   AST 70* 59* 57*   PROTTOTAL 6.4 6.3* 7.1   ALBUMIN 4.2 4.2 4.5       Iron Panel -   Recent Labs   Lab Test 02/22/23  0610 02/16/23  0543 12/20/22  0703 10/06/22  0958 04/07/22  0624   IRON 26*  --  249*  --  85   IRONSAT 19  --   --   --   --    ASCENCION 1,465*   < > 2,207*   < > 423*    < > = values in this interval not displayed.         Imaging:  All imaging studies reviewed  by me.     Current Medications:    albumin human  50 g Irrigation Once     fibrinogen concentrate (Human)  1 g Intravenous Once     fibrinogen concentrate (Human)  1 g Intravenous Once     fluconazole  400 mg Intravenous Once     FLUoxetine  60 mg Oral At Bedtime     folic acid  1 mg Oral Daily     insulin aspart  1-7 Units Subcutaneous TID AC     insulin aspart  1-5 Units Subcutaneous At Bedtime     insulin aspart   Subcutaneous Daily with breakfast     insulin aspart   Subcutaneous Daily with lunch     insulin aspart   Subcutaneous Daily with supper     insulin glargine  10 Units Subcutaneous BID     lactulose  20 g Oral BID     [START ON 2/25/2023] methylPREDNISolone  1,000 mg Intravenous Once     multivitamin, therapeutic  1 tablet Per Feeding Tube Daily     pantoprazole  40 mg Oral BID     piperacillin-tazobactam  3.375 g Intravenous Once     prothrombin 4 factor complex concentrate  1,126 Units Intravenous Once     prothrombin 4 factor complex concentrate  518 Units Intravenous Once     prothrombin 4 factor complex concentrate  547 Units Intravenous Once     rifaximin  550 mg Oral BID     sodium chloride (PF)  3 mL Intracatheter Q8H     sodium chloride (PF)  3 mL Intracatheter Q8H     [Held by provider] spironolactone  100 mg Oral At Bedtime     thiamine  100 mg Oral Daily       calcium chloride 4 g in 0.9% NaCl intermittent infusion       - MEDICATION INSTRUCTIONS -       dextrose       EPINEPHrine       fentanyl       norepinephrine       vasopressin         I was present with the fellow during the history and exam.  I discussed the case with the fellow and agree with the findings as documented in the assessment and plan.    Khushbu Cabello MD   of Medicine  Department of Nephrology  [facility]

## 2023-02-25 NOTE — PLAN OF CARE
Neuro: A&Ox4. [place] Q4-score 0. Lethargic in morning, more alert in afternoon  Cardiac: SR. VSS.HR 80s  Respiratory: Sating 90s on RA. 1.5-2L NC when sleeping NOC  GI/: Adequate urine output. BM 2/24  Diet/appetite: Tolerating 2g K+ diet. Eating poor.   Activity:  Assist of 1, up to bath  Pain: At acceptable level on current regimen.   Skin: No new deficits noted. Bruising, jaundice skin   LDA's: PIV L and R    Plan: Continue with POC. Notify primary team with changes. Active on liver transplant list.

## 2023-02-25 NOTE — PROGRESS NOTES
[facility]    Progress Note - Medicine Service, MAROON TEAM 2       Date of Admission:  2/11/2023    Assessment & Plan   Dillon Salter is a 27 year old male with history of Asperger's, alcoholic cirrhosis c/b esophageal varices and hepatic encephalopathy currently undergoing liver transplant evaluation at [facility], DM II who was directly admitted from [facility] for hematemesis, decompensated cirrhosis and hepatic encephalopathy. Hemodynamically stable with resolved encephalopathy. Course complicated by ASHISH. Completed expedited liver transplant workup with GI, now listed.    Changes Today:  - Remains active on liver transplant list   - Improving Cr, holding off diuresis today   - strict I/O, weight. Reassess for diuresis tmw       ASHISH non-oliguric, improving  Baseline Cr <1, acutely worsened 2/18. Cr peaked at 2.2 on 2/22, currently improving  - Strict I/O  - Was holding Coreg, resumed 2/25  - S/p Fluid challenge, then diuresis with 3 mg Bumex BID  - Hold diuresis today, reassess tomorrow given improving Cr, rising bicarb  - Avoid nephrotoxic agents   - Nephrology following, appreciate recs    Acute hypoxic respiratory failure 2/2 acute pulmonary edema  Overnight 2/21-2/22 AM patient noticed shortness of breath. CXR with opacities likely related to edema  - diuresis as above    Acute on chronic blood loss anemia- stable  Hematemesis- resolved  Hx of esophageal varices   Portal hypertensive gastropathy  Esophageal ulceration  Recurrent rectal varices  2-3 episodes hematemesis prior to ED of ~400 mL, ~325 mL at ER, 250 mL anu hematemesis since arrival. HDS. Given 1 unit pRBCs on 2/12 for Hgb of 6.9. INR 1.48. Esophageal varices recently banded 1/23/23. EGD 2/12 AM: diffuse oozing likely 2/2 portal hypertensive gastropathy, no bleeding seen from recent banding, nonbleeding esophageal ulcers seen. No continued hematemesis. Transfused for Hgb 6.8 on 2/15 and for  Hgb 6.6 on 2/20, 6.8 on 2/25  - trend hgb daily and transfuse for <7  - pantoprazole 40mg PO BID  - finished course of ceftriaxone 1g qday for total of 7d  - octreotide discontinued 2/13, restarted evening of 2/13 for presumed hematemesis, discontinued 2/14  - coreg 6.25mg BID  - GI following    > follow up with GI for a repeat EGD with possible RFA in 3 months    > will need CMP and CBC 1 week after discharge (GI to set up)  - iron studies and hemolysis labs for continued downtrend in Hgb- peripheral smear with normochromic macrocytic anemia with acanthocytes, likely related to liver disease    Hyperkalemia, resolved  Moderate 6.1 2/19AM, in setting of ASHISH. Has been as high as 6.3 this admission previously thought to be related to potential absorption of blood from gastropathy, and/or insulin deficient state, now most likely elevated in the setting of ASHISH. Has received lokelma and kayexalate on several occassions, shifted with insulin multiple times. No associated EKG changes on multiple checks.   - Trend K   - Lokelma discontinued  - nephrology following, as above    Hepatic encephalopathy, resolved  Decompensated alcohol related cirrhosis c/b esophageal varices   Portal HTN and splenomegaly  Coagulopathy due to liver disease  Thrombocytopenia due to liver disease, present on admission  Follows with Dr. Wood. MELD-Na 24 today  Etiology: Alcohol, diagnosed 4/2022  HE: lactulose BID, continue rifaximin  EV/GV: esophageal varices s/p banding 1/23/23, stable on EGD 2/12 without evidence of bleeding; on carvedilol 6.25mg BID, held 2/18 with ASHISH  Coagulopathy: INR 1.48,  (suspect 171 on admission was hemoconcentration)  Ascites: None per RUQ 2/12, held PTA bumex 1 mg and spironolactone given ASHISH -> bumex 2/16 - 2/18 with ASHISH, resumed 2/21-2/24  SBP:  no h/o   HCC: last CT 1/2023, no evidence of  Liver transplant candidacy: Currently listed, follows with Dr. Wood.  - GI consulted, appreciate recs  - daily MELD  labs  - <2g Na, <2L water, >1.5 g/kg/day protein diet  - Bcx NGTD  - expedited liver transplant eval per GI    > dobutamine stress test 2/22 WNL    > repeat peth level negative    MELD-Na score: 22 at 2/25/2023  5:54 AM  MELD score: 22 at 2/25/2023  5:54 AM  Calculated from:  Serum Creatinine: 1.24 mg/dL at 2/25/2023  5:54 AM  Serum Sodium: 137 mmol/L at 2/25/2023  5:54 AM  Total Bilirubin: 7.4 mg/dL at 2/25/2023  5:54 AM  INR(ratio): 1.79 at 2/25/2023  5:54 AM  Age: 27 years    Hypoglycemia  Hyperglycemia  DM II - hgb A1c 5.6%  Glargine increased to 28 units BID on 2/18 (home dose is 20 units BID, have been titrating here), now holding given hypoglycemia.  - medium dose sliding scale insulin  - hypoglycemia protocol  - carb coverage 1:10 w/ meals and snacks   - lantus 10 units BID    Hypervolemic hyponatremia  - holding spironolactone given hyperkalemia, ASHISH  - diuresis as above  - salt restriction    Lactic acidosis, resolved  Lactate 2.8 on admission. Likely 2/2 hypovolemia in the setting of acute GIB. Given 500 ml w/ K shifting on 2/12, then 1u pRBC.     CHRONIC & STABLE PROBLEMS  MDD/anxiety   Autism spectrum   Mother is his caretaker, lives with parents.   - continue PTA fluoxetine  - previously his mother, Leticia, has been able to stay with patient overnight which helps with mood/anxiety. This was discussed with charge RN    Chronic pain, musculoskeletal  - Continue PTA oxycodone 5mg BID PRN  - Gabapentin not a good option - previously caused excessive drowsiness  - Continue Tylenol, max 2 Gm daily  - Will discuss other agents    Malnutrition:   Level of malnutrition: Severe   - Nutrition plan (see note from 2/14)  --> Discontinue clear liquid supplements  --> Add Ensure Plus (vanilla) at 2 PM and Special K protein bar once stephen, also at 2 PM per request       Diet: Snacks/Supplements Adult: Other; See comments below; With Meals  Diet  Regular Diet Adult    DVT Prophylaxis: Pneumatic compression device  Migdalia  Catheter: Not present  Fluids: None  Lines: None     Cardiac Monitoring: None  Code Status: Full Code         Disposition Plan    Remains in hospital for liver transplant      The patient's care was discussed with patient, family     Joshua Kapadia MD  [facility] Hospitalist    __________________________    Interval History   No acute events overnight, tired this morning, came close to receiving liver again     Physical Exam   Vital Signs: Temp: 98.5  F (36.9  C) Temp src: Oral BP: (!) 142/77 Pulse: 86   Resp: 21 SpO2: 94 % O2 Device: None (Room air) Oxygen Delivery: 2 LPM  Weight: 172 lbs 6.4 oz    Constitutional: Somnolent, in NAD  Respiratory: Bibasilar crackles   Cardiovascular: Regular rate and rhythm, systolic murmur heard, LE edema noted   GI: Normal bowel sounds, soft, non-distended, non-tender  Skin/Integumen: No rashes, no cyanosis      Medical Decision Making      Please see A&P for additional details of medical decision making.      Data   All labs and imaging reviewed

## 2023-02-25 NOTE — PROGRESS NOTES
Discussed with patient unfortunate need to decline organ for patient as donor had sudden decline prior to procurement, will remain active on list. Patient understands and remains hopeful

## 2023-02-25 NOTE — PLAN OF CARE
Goal Outcome Evaluation:    Neuro: A&OX4 lethargic at times  Cardiac: Tele orders. NSR with rare PAC's. VSS. Afebrile.   Respiratory: Sating >94% on 2L NC. Crackles in bases of bilateral lungs, expiratory wheezes, GONZALEZ.   GI/: Incontinent of urine in AM. No BM this shift.   Diet/appetite: Tolerating 2g K+, consistent carb, low sodium diet. Needing encouragement. Carb coverage insulin orders in place.    Activity:  Assist of 1 w/ gaitbelt and walker, up to chair and in halls.   Pain: PRN Oxycodone given x1 for generalized pain. C/o most pain in BLE.  Skin: No new deficits noted. Lymph wraps on BLE in place.  LDA's: R PIV - SL, L PIV -SL.  (new L PIV)     Plan: Continue with POC. Notify primary team with changes. Continue to encourage oral intake. Continue to encourage activity OOB. Plan for liver transplant in morning.

## 2023-02-26 ENCOUNTER — APPOINTMENT (OUTPATIENT)
Dept: OCCUPATIONAL THERAPY | Facility: CLINIC | Age: 28
DRG: 005 | End: 2023-02-26
Attending: STUDENT IN AN ORGANIZED HEALTH CARE EDUCATION/TRAINING PROGRAM
Payer: COMMERCIAL

## 2023-02-26 LAB
ALBUMIN SERPL BCG-MCNC: 4 G/DL (ref 3.5–5.2)
ALP SERPL-CCNC: 104 U/L (ref 40–129)
ALT SERPL W P-5'-P-CCNC: 47 U/L (ref 10–50)
ANION GAP SERPL CALCULATED.3IONS-SCNC: 10 MMOL/L (ref 7–15)
AST SERPL W P-5'-P-CCNC: 92 U/L (ref 10–50)
BILIRUB SERPL-MCNC: 8.4 MG/DL
BUN SERPL-MCNC: 70 MG/DL (ref 6–20)
CALCIUM SERPL-MCNC: 8.7 MG/DL (ref 8.6–10)
CHLORIDE SERPL-SCNC: 90 MMOL/L (ref 98–107)
CREAT SERPL-MCNC: 1.11 MG/DL (ref 0.67–1.17)
DEPRECATED HCO3 PLAS-SCNC: 32 MMOL/L (ref 22–29)
ERYTHROCYTE [DISTWIDTH] IN BLOOD BY AUTOMATED COUNT: 21.4 % (ref 10–15)
GFR SERPL CREATININE-BSD FRML MDRD: >90 ML/MIN/1.73M2
GLUCOSE BLDC GLUCOMTR-MCNC: 121 MG/DL (ref 70–99)
GLUCOSE BLDC GLUCOMTR-MCNC: 146 MG/DL (ref 70–99)
GLUCOSE BLDC GLUCOMTR-MCNC: 148 MG/DL (ref 70–99)
GLUCOSE BLDC GLUCOMTR-MCNC: 255 MG/DL (ref 70–99)
GLUCOSE BLDC GLUCOMTR-MCNC: 276 MG/DL (ref 70–99)
GLUCOSE BLDC GLUCOMTR-MCNC: 302 MG/DL (ref 70–99)
GLUCOSE SERPL-MCNC: 418 MG/DL (ref 70–99)
HCT VFR BLD AUTO: 23.4 % (ref 40–53)
HGB BLD-MCNC: 7.6 G/DL (ref 13.3–17.7)
INR PPP: 1.82 (ref 0.85–1.15)
MCH RBC QN AUTO: 31.7 PG (ref 26.5–33)
MCHC RBC AUTO-ENTMCNC: 32.5 G/DL (ref 31.5–36.5)
MCV RBC AUTO: 98 FL (ref 78–100)
PLATELET # BLD AUTO: 113 10E3/UL (ref 150–450)
POTASSIUM SERPL-SCNC: 4.3 MMOL/L (ref 3.4–5.3)
PROT SERPL-MCNC: 6.1 G/DL (ref 6.4–8.3)
RBC # BLD AUTO: 2.4 10E6/UL (ref 4.4–5.9)
SODIUM SERPL-SCNC: 132 MMOL/L (ref 136–145)
WBC # BLD AUTO: 7 10E3/UL (ref 4–11)

## 2023-02-26 PROCEDURE — 99232 SBSQ HOSP IP/OBS MODERATE 35: CPT | Mod: GC | Performed by: INTERNAL MEDICINE

## 2023-02-26 PROCEDURE — 250N000013 HC RX MED GY IP 250 OP 250 PS 637: Performed by: INTERNAL MEDICINE

## 2023-02-26 PROCEDURE — 80053 COMPREHEN METABOLIC PANEL: CPT

## 2023-02-26 PROCEDURE — 85610 PROTHROMBIN TIME: CPT

## 2023-02-26 PROCEDURE — 250N000013 HC RX MED GY IP 250 OP 250 PS 637

## 2023-02-26 PROCEDURE — 97140 MANUAL THERAPY 1/> REGIONS: CPT | Mod: GO

## 2023-02-26 PROCEDURE — 36415 COLL VENOUS BLD VENIPUNCTURE: CPT

## 2023-02-26 PROCEDURE — 250N000013 HC RX MED GY IP 250 OP 250 PS 637: Performed by: STUDENT IN AN ORGANIZED HEALTH CARE EDUCATION/TRAINING PROGRAM

## 2023-02-26 PROCEDURE — 120N000003 HC R&B IMCU UMMC

## 2023-02-26 PROCEDURE — 99233 SBSQ HOSP IP/OBS HIGH 50: CPT | Performed by: INTERNAL MEDICINE

## 2023-02-26 PROCEDURE — 85027 COMPLETE CBC AUTOMATED: CPT

## 2023-02-26 RX ORDER — BUMETANIDE 1 MG/1
1 TABLET ORAL
Status: COMPLETED | OUTPATIENT
Start: 2023-02-26 | End: 2023-02-27

## 2023-02-26 RX ADMIN — THERA TABS 1 TABLET: TAB at 10:17

## 2023-02-26 RX ADMIN — LACTULOSE 20 G: 10 POWDER, FOR SOLUTION ORAL at 21:56

## 2023-02-26 RX ADMIN — PANTOPRAZOLE SODIUM 40 MG: 40 TABLET, DELAYED RELEASE ORAL at 20:05

## 2023-02-26 RX ADMIN — OXYCODONE HYDROCHLORIDE 5 MG: 5 TABLET ORAL at 11:47

## 2023-02-26 RX ADMIN — PANTOPRAZOLE SODIUM 40 MG: 40 TABLET, DELAYED RELEASE ORAL at 10:18

## 2023-02-26 RX ADMIN — FOLIC ACID 1 MG: 1 TABLET ORAL at 10:17

## 2023-02-26 RX ADMIN — BUMETANIDE 1 MG: 1 TABLET ORAL at 16:00

## 2023-02-26 RX ADMIN — RIFAXIMIN 550 MG: 550 TABLET ORAL at 20:05

## 2023-02-26 RX ADMIN — CARVEDILOL 6.25 MG: 6.25 TABLET, FILM COATED ORAL at 18:46

## 2023-02-26 RX ADMIN — CARVEDILOL 6.25 MG: 6.25 TABLET, FILM COATED ORAL at 10:17

## 2023-02-26 RX ADMIN — THIAMINE HCL TAB 100 MG 100 MG: 100 TAB at 10:18

## 2023-02-26 RX ADMIN — LACTULOSE 20 G: 10 POWDER, FOR SOLUTION ORAL at 10:17

## 2023-02-26 RX ADMIN — BUMETANIDE 1 MG: 1 TABLET ORAL at 10:25

## 2023-02-26 RX ADMIN — FLUOXETINE 60 MG: 20 CAPSULE ORAL at 21:56

## 2023-02-26 RX ADMIN — RIFAXIMIN 550 MG: 550 TABLET ORAL at 10:18

## 2023-02-26 ASSESSMENT — ACTIVITIES OF DAILY LIVING (ADL)
ADLS_ACUITY_SCORE: 33

## 2023-02-26 NOTE — PLAN OF CARE
/81 (BP Location: Right arm)   Pulse 76   Temp 97.5  F (36.4  C) (Oral)   Resp 18   Wt 78.6 kg (173 lb 4.5 oz)   SpO2 96%   BMI 28.84 kg/m      Neuro: A&Ox4. [place] score Q4 0. Able to verbalize needs calls appropriately. BLE numbness/tingling present (baseline). BUE tremors (baseline).   Cardiac: SR 70s-80s. VSS. Afebrile.    Respiratory: Sating >92% on RA when awake. Intermittent use of 2L NC when asleep to mentain sats >95%. IS education and instructions provided. SOB with exertion.    GI/: Adequate urine output. BM X3 loose watery. Using commode and bathroom.   Diet/appetite: Tolerating 2g K+ diet. Appetite improving.   Activity:  Assist of x1/SBA, up to chair and in halls. Generalized weakness.   Pain: At acceptable level on current regimen. x1 PRN Oxy given with improvement.   Skin: generalized bruising, Scab on upper right chest, bruised/red buttocks/coxyx.   LDA's: x2 PIV (R/L) saline locked.     Plan: Encourage OOB/activity, and IS use. Continue with POC. Notify primary team with changes.

## 2023-02-26 NOTE — PROGRESS NOTES
Nephrology Progress Note  02/26/2023         Assessment & Recommendations:   # ASHISH likely due to congestion   # Hyperkalemia  # Hypochloremia  #Hypervolumia gaining weight, has not gotten diuretic since the 2/18, echo concerning for volume overload,Edema  # Cirrhosis  #Compensated Respiratory acidosis: PH of 7.38/54/32  Dillon Salter is a 27 year old male with history of Asperger's, alcoholic cirrhosis c/b esophageal varices and hepatic encephalopathy currently undergoing liver transplant evaluation at [facility], DM II who was directly admitted from [facility] for hematemesis, decompensated cirrhosis and hepatic encephalopathy.      Patient creatinine is improving responding to diuresis     Recommendations:  -continue bumex , hold if BP <100  We will sign off for now, please call with questions     Recommendations were communicated to primary team via note      Khushbu Gutierrez Cabello MD   Division of Renal Disease and Hypertension  [facility]  [email or web address]  Vocera Web Console    Interval History :   Nursing and provider notes from last 24 hours reviewed.  In the last 24 hours Dillon Salter is doing well, got a liver offer but did not work out.  Great UOP.  Bumex lowered to 1mg. Creatinine down to 1.1    Review of Systems:   I reviewed the following systems:  GI: Improve appetite. No nausea or vomiting or diarrhea.   Neuro:  No confusion  Constitutional:  No fever or chills  CV: positive dyspnea or edema.  No chest pain.    Physical Exam:   I/O last 3 completed shifts:  In: 1080 [P.O.:780]  Out: 200 [Urine:200]   /68 (BP Location: Right arm)   Pulse 82   Temp 98.4  F (36.9  C) (Oral)   Resp 16   Wt 78.6 kg (173 lb 4.5 oz)   SpO2 93%   BMI 28.84 kg/m       GENERAL APPEARANCE: Patient is not in distress  EYES:  Positive scleral icterus, pupils equal  PULM: Crackles in bilateral lower lung bases, equal air movement, no clubbing  CV: Patient has regular rhythm, normal rate, no rub     -JVD -ve     -edema  +1  GI: soft, non tender, non distended, bowel sounds are present  INTEGUMENT: no cyanosis, no rash  NEURO:  AOx3, grossly nonfocal, moves all extremities spontaneously  Access none    Labs:   All labs reviewed by me  Electrolytes/Renal -   Recent Labs   Lab Test 02/26/23  0555 02/26/23  0002 02/25/23  2113 02/25/23  0836 02/25/23  0554 02/24/23  1738 02/24/23  1718 02/12/23  0839 02/12/23  0811 01/28/23  0602 01/28/23  0541   *  --   --   --  137  --  136  136   < >  --    < > 129*   POTASSIUM 4.3  --   --   --  3.8  --  3.7  3.7   < > 6.3*   < > 4.9   CHLORIDE 90*  --   --   --  92*  --  92*  92*   < >  --    < > 97*   CO2 32*  --   --   --  34*  --  32*  32*   < >  --    < > 24   BUN 70.0*  --   --   --  77.4*  --  84.1*  84.1*   < >  --    < > 19.7   CR 1.11  --   --   --  1.24*  --  1.52*  1.52*   < >  --    < > 0.89   * 146* 280*   < > 97   < > 217*  217*   < >  --    < > 266*   SARTHAK 8.7  --   --   --  9.0  --  9.2  9.2   < >  --    < > 8.0*   MAG  --   --   --   --   --   --  3.8*  --  1.6*  --  2.0   PHOS  --   --   --   --   --   --  4.0  --  4.6*  --  2.5    < > = values in this interval not displayed.       CBC -   Recent Labs   Lab Test 02/26/23 0555 02/25/23 0554 02/24/23 1718   WBC 7.0 6.4 7.3   HGB 7.6* 6.8* 7.0*   * 106* 120*       LFTs -   Recent Labs   Lab Test 02/26/23 0555 02/25/23  0554 02/24/23  1718   ALKPHOS 104 102 104   BILITOTAL 8.4* 7.4* 8.0*   ALT 47 40 37   AST 92* 77* 70*   PROTTOTAL 6.1* 6.2* 6.4   ALBUMIN 4.0 4.0 4.2       Iron Panel -   Recent Labs   Lab Test 02/22/23  0610 02/16/23  0543 12/20/22  0703 10/06/22  0958 04/07/22  0624   IRON 26*  --  249*  --  85   IRONSAT 19  --   --   --   --    ASCENCION 1,465*   < > 2,207*   < > 423*    < > = values in this interval not displayed.         Imaging:  All imaging studies reviewed by me.     Current Medications:    bumetanide  1 mg Oral BID     carvedilol  6.25 mg Oral BID w/meals     FLUoxetine  60 mg Oral At  Bedtime     folic acid  1 mg Oral Daily     insulin aspart  1-7 Units Subcutaneous TID AC     insulin aspart  1-5 Units Subcutaneous At Bedtime     insulin aspart   Subcutaneous Daily with breakfast     insulin aspart   Subcutaneous Daily with lunch     insulin aspart   Subcutaneous Daily with supper     insulin glargine  10 Units Subcutaneous BID     lactulose  20 g Oral BID     multivitamin, therapeutic  1 tablet Per Feeding Tube Daily     pantoprazole  40 mg Oral BID     rifaximin  550 mg Oral BID     sodium chloride (PF)  3 mL Intracatheter Q8H     [Held by provider] spironolactone  100 mg Oral At Bedtime     thiamine  100 mg Oral Daily       - MEDICATION INSTRUCTIONS -       dextrose       EPINEPHrine       fentanyl       norepinephrine       vasopressin

## 2023-02-26 NOTE — CARE PLAN
Major Shift Events:  Pain well controlled with PRN's.  Pt up to chair for 3hrs, well tolerated.  Weaned to room air.  No BM as of this writing.  Incontinent void in brief.    Plan: Work up for Liver Tx, awaiting donor liver.   For vital signs and complete assessments, please see documentation flowsheets.

## 2023-02-26 NOTE — PROGRESS NOTES
[facility]    Progress Note - Medicine Service, MAROON TEAM 2       Date of Admission:  2/11/2023    Assessment & Plan   Dillon Salter is a 27 year old male with history of Asperger's, alcoholic cirrhosis c/b esophageal varices and hepatic encephalopathy currently undergoing liver transplant evaluation at [facility], DM II who was directly admitted from [facility] for hematemesis, decompensated cirrhosis and hepatic encephalopathy. Hemodynamically stable with resolved encephalopathy. Course complicated by ASHISH. Completed expedited liver transplant workup with GI, now listed.    Changes Today:  - Remains active on liver transplant list   - Improving Cr   - strict I/O, weight  - bumex 1mg PO x2 today      ASHISH non-oliguric, improving  Baseline Cr <1, acutely worsened 2/18. Cr peaked at 2.2 on 2/22, currently improving. S/p Fluid challenge, then diuresed successfully with 3 mg Bumex BID and Cr improving. Likely combination of ATN, +/- cardiorenal/congestive physiology that is now improved w/ diuresis.  - Strict I/O  - Was holding Coreg, resumed 2/25  - Diuresis with 1mg PO bumex x2 2/26  - Avoid nephrotoxic agents   - Nephrology following, appreciate recs    Acute hypoxic respiratory failure 2/2 acute pulmonary edema, resolved  Overnight 2/21-2/22 AM patient noticed shortness of breath. CXR with opacities likely related to edema. Improved with diuresis (required 3 mg IV Bumex in setting of ATN).    Acute on chronic blood loss anemia- stable  Hematemesis- resolved  Hx of esophageal varices   Portal hypertensive gastropathy  Esophageal ulceration  Recurrent rectal varices  2-3 episodes hematemesis prior to ED of ~400 mL, ~325 mL at ER, 250 mL anu hematemesis since arrival. HDS. Given 1 unit pRBCs on 2/12 for Hgb of 6.9. INR 1.48. Esophageal varices recently banded 1/23/23. EGD 2/12 AM: diffuse oozing likely 2/2 portal hypertensive gastropathy, no bleeding seen from  recent banding, nonbleeding esophageal ulcers seen. No continued hematemesis. Octreotide was initially given on admission then discontinued 2/13, restarted evening of 2/13 for presumed hematemesis, discontinued 2/14. Transfused for Hgb 6.8 on 2/15 and for Hgb 6.6 on 2/20, 6.8 on 2/25. Iron studies and hemolysis labs for continued downtrend in Hgb- peripheral smear with normochromic macrocytic anemia with acanthocytes, likely related to liver disease.  - trend hgb daily and transfuse for <7  - continue pantoprazole 40mg PO BID  - coreg 6.25mg BID  - GI following    > follow up with GI for a repeat EGD with possible RFA in 3 months    > will need CMP and CBC 1 week after discharge (GI to set up)    Hyperkalemia, resolved  Moderate 6.1 2/19AM, in setting of ASHISH. Has been as high as 6.3 this admission previously thought to be related to potential absorption of blood from gastropathy, and/or insulin deficient state, now most likely elevated in the setting of ASHISH. Has received lokelma and kayexalate on several occassions, shifted with insulin multiple times. No associated EKG changes on multiple checks.  - Trend BMP    Hepatic encephalopathy, resolved  Decompensated alcohol related cirrhosis c/b esophageal varices   Portal HTN and splenomegaly  Coagulopathy due to liver disease  Thrombocytopenia due to liver disease, present on admission  Follows with Dr. Wood. MELD-Na 24 today  Etiology: Alcohol, diagnosed 4/2022  HE: lactulose BID, continue rifaximin  EV/GV: esophageal varices s/p banding 1/23/23, stable on EGD 2/12 without evidence of bleeding; on carvedilol 6.25mg BID, held 2/18 with ASHISH, resumed 2/25  Coagulopathy: INR 1.48,  (suspect 171 on admission was hemoconcentration)  Ascites: None per RUQ 2/12, held PTA bumex 1 mg and spironolactone given ASHISH -> resumed PO Bumex 2/26 as noted above, still holding spironolactone given prior hyperkalemia  SBP:  no h/o   HCC: last CT 1/2023, no evidence of  Liver  transplant candidacy: Currently listed, follows with Dr. Wood.  - GI consulted, appreciate recs  - daily MELD labs  - <2g Na, <2L water, >1.5 g/kg/day protein diet  - Bcx NGTD  - Expedited liver transplant eval per GI, currently listed for transplant    > dobutamine stress test 2/22 WNL    > repeat peth level negative    MELD-Na score: 25 at 2/26/2023  5:55 AM  MELD score: 22 at 2/26/2023  5:55 AM  Calculated from:  Serum Creatinine: 1.11 mg/dL at 2/26/2023  5:55 AM  Serum Sodium: 132 mmol/L at 2/26/2023  5:55 AM  Total Bilirubin: 8.4 mg/dL at 2/26/2023  5:55 AM  INR(ratio): 1.82 at 2/26/2023  5:55 AM  Age: 27 years    Hypoglycemia  Hyperglycemia  DM II - hgb A1c 5.6%  Glargine increased to 28 units BID on 2/18 (home dose is 20 units BID, have been titrating here), briefly held given hypoglycemia then lantus dose was reduced.  - medium dose sliding scale insulin  - hypoglycemia protocol  - carb coverage 1:10 w/ meals and snacks   - lantus 10 units BID    Hypervolemic hyponatremia  - holding spironolactone given hyperkalemia, ASHISH  - diuresis as above  - salt restriction    Lactic acidosis, resolved  Lactate 2.8 on admission. Likely 2/2 hypovolemia in the setting of acute GIB. Given 500 ml w/ K shifting on 2/12, then 1u pRBC.     CHRONIC & STABLE PROBLEMS  MDD/anxiety   Autism spectrum   Mother is his caretaker, lives with parents.   - continue PTA fluoxetine  - previously his mother, Leticia, has been able to stay with patient overnight which helps with mood/anxiety. This was discussed with charge RN    Chronic pain, musculoskeletal  - Continue PTA oxycodone 5mg BID PRN  - Gabapentin not a good option - previously caused excessive drowsiness  - Continue Tylenol, max 2 Gm daily  - Will discuss other agents    Malnutrition:   Level of malnutrition: Severe   - Nutrition plan (see note from 2/14)  --> Discontinue clear liquid supplements  --> Add Ensure Plus (vanilla) at 2 PM and Special K protein bar once stephen, also at 2  PM per request       Diet: Snacks/Supplements Adult: Other; See comments below; With Meals  Diet  Regular Diet Adult    DVT Prophylaxis: Pneumatic compression device  Negron Catheter: Not present  Fluids: None  Lines: None     Cardiac Monitoring: None  Code Status: Full Code         Disposition Plan    Remains in hospital for liver transplant         The patient's care was discussed with the Attending Physician, Dr. Kapadia.    Narcisa Willard, MS3  Medical Student    Resident/Fellow Attestation   I, Radha Granado MD, was present with the medical/CHRISTA student who participated in the service and in the documentation of the note.  I have verified the history and personally performed the physical exam and medical decision making.  I agree with the assessment and plan of care as documented in the note.      Radha Granado MD  PGY2  Date of Service (when I saw the patient): 02/26/23   __________________________    Interval History   Nursing notes reviewed. No acute events overnight, tired this morning. Feels fine, notes that he has been voiding quite a bit with diuretic. Continued swelling in lower extremities, improving somewhat.    Physical Exam   Vital Signs: Temp: 97.5  F (36.4  C) Temp src: Oral BP: 134/81 Pulse: 76   Resp: 18 SpO2: 92 % O2 Device: None (Room air) Oxygen Delivery: 2 LPM  Weight: 173 lbs 4.5 oz    Constitutional: Awake, alert, NAD  Respiratory: Bibasilar crackles   Cardiovascular: Regular rate and rhythm, systolic murmur heard, LE edema noted   GI: Normal bowel sounds, soft, non-distended, non-tender  Skin/Integumen: No rashes, no cyanosis      Medical Decision Making      Please see A&P for additional details of medical decision making.      Data   All labs and imaging reviewed

## 2023-02-27 ENCOUNTER — APPOINTMENT (OUTPATIENT)
Dept: GENERAL RADIOLOGY | Facility: CLINIC | Age: 28
DRG: 005 | End: 2023-02-27
Attending: PHYSICIAN ASSISTANT
Payer: COMMERCIAL

## 2023-02-27 ENCOUNTER — ORGAN (OUTPATIENT)
Dept: TRANSPLANT | Facility: CLINIC | Age: 28
End: 2023-02-27

## 2023-02-27 ENCOUNTER — APPOINTMENT (OUTPATIENT)
Dept: OCCUPATIONAL THERAPY | Facility: CLINIC | Age: 28
DRG: 005 | End: 2023-02-27
Attending: STUDENT IN AN ORGANIZED HEALTH CARE EDUCATION/TRAINING PROGRAM
Payer: COMMERCIAL

## 2023-02-27 LAB
ABO/RH(D): NORMAL
ALBUMIN SERPL BCG-MCNC: 4.1 G/DL (ref 3.5–5.2)
ALBUMIN UR-MCNC: 30 MG/DL
ALP SERPL-CCNC: 106 U/L (ref 40–129)
ALT SERPL W P-5'-P-CCNC: 48 U/L (ref 10–50)
AMYLASE SERPL-CCNC: 12 U/L (ref 28–100)
ANION GAP SERPL CALCULATED.3IONS-SCNC: 11 MMOL/L (ref 7–15)
ANION GAP SERPL CALCULATED.3IONS-SCNC: 7 MMOL/L (ref 7–15)
ANTIBODY SCREEN: NEGATIVE
APPEARANCE UR: CLEAR
APTT PPP: 44 SECONDS (ref 22–38)
AST SERPL W P-5'-P-CCNC: 85 U/L (ref 10–50)
ATRIAL RATE - MUSE: 70 BPM
ATRIAL RATE - MUSE: 94 BPM
BASOPHILS # BLD AUTO: 0.1 10E3/UL (ref 0–0.2)
BASOPHILS NFR BLD AUTO: 1 %
BILIRUB SERPL-MCNC: 8.5 MG/DL
BILIRUB UR QL STRIP: NEGATIVE
BLD PROD TYP BPU: NORMAL
BLOOD COMPONENT TYPE: NORMAL
BUN SERPL-MCNC: 69.5 MG/DL (ref 6–20)
BUN SERPL-MCNC: 70.9 MG/DL (ref 6–20)
CALCIUM SERPL-MCNC: 9.1 MG/DL (ref 8.6–10)
CALCIUM SERPL-MCNC: 9.9 MG/DL (ref 8.6–10)
CHLORIDE SERPL-SCNC: 89 MMOL/L (ref 98–107)
CHLORIDE SERPL-SCNC: 90 MMOL/L (ref 98–107)
CMV IGG SERPL IA-ACNC: <0.2 U/ML
CMV IGG SERPL IA-ACNC: <0.2 U/ML
CMV IGG SERPL IA-ACNC: NORMAL
CMV IGG SERPL IA-ACNC: NORMAL
CMV IGM SERPL IA-ACNC: <8 AU/ML
CMV IGM SERPL IA-ACNC: <8 AU/ML
CMV IGM SERPL IA-ACNC: NEGATIVE
CMV IGM SERPL IA-ACNC: NEGATIVE
CODING SYSTEM: NORMAL
COLOR UR AUTO: YELLOW
CREAT SERPL-MCNC: 1.22 MG/DL (ref 0.67–1.17)
CREAT SERPL-MCNC: 1.43 MG/DL (ref 0.67–1.17)
CROSSMATCH: NORMAL
DEPRECATED HCO3 PLAS-SCNC: 34 MMOL/L (ref 22–29)
DEPRECATED HCO3 PLAS-SCNC: 35 MMOL/L (ref 22–29)
DIASTOLIC BLOOD PRESSURE - MUSE: NORMAL MMHG
DIASTOLIC BLOOD PRESSURE - MUSE: NORMAL MMHG
EBV VCA IGG SER IA-ACNC: 10.4 U/ML
EBV VCA IGG SER IA-ACNC: 11.7 U/ML
EBV VCA IGG SER IA-ACNC: NORMAL
EBV VCA IGG SER IA-ACNC: NORMAL
EBV VCA IGM SER IA-ACNC: 14.1 U/ML
EBV VCA IGM SER IA-ACNC: <10 U/ML
EBV VCA IGM SER IA-ACNC: NORMAL
EBV VCA IGM SER IA-ACNC: NORMAL
EOSINOPHIL # BLD AUTO: 0.3 10E3/UL (ref 0–0.7)
EOSINOPHIL NFR BLD AUTO: 3 %
ERYTHROCYTE [DISTWIDTH] IN BLOOD BY AUTOMATED COUNT: 20.5 % (ref 10–15)
ERYTHROCYTE [DISTWIDTH] IN BLOOD BY AUTOMATED COUNT: 20.7 % (ref 10–15)
FIBRINOGEN PPP-MCNC: 206 MG/DL (ref 170–490)
GFR SERPL CREATININE-BSD FRML MDRD: 68 ML/MIN/1.73M2
GFR SERPL CREATININE-BSD FRML MDRD: 82 ML/MIN/1.73M2
GLUCOSE BLDC GLUCOMTR-MCNC: 121 MG/DL (ref 70–99)
GLUCOSE BLDC GLUCOMTR-MCNC: 193 MG/DL (ref 70–99)
GLUCOSE BLDC GLUCOMTR-MCNC: 215 MG/DL (ref 70–99)
GLUCOSE BLDC GLUCOMTR-MCNC: 76 MG/DL (ref 70–99)
GLUCOSE BLDC GLUCOMTR-MCNC: 77 MG/DL (ref 70–99)
GLUCOSE SERPL-MCNC: 71 MG/DL (ref 70–99)
GLUCOSE SERPL-MCNC: 86 MG/DL (ref 70–99)
GLUCOSE UR STRIP-MCNC: NEGATIVE MG/DL
HBV CORE AB SERPL QL IA: NONREACTIVE
HBV DNA SERPL QL NAA+PROBE: NORMAL
HBV SURFACE AB SERPL IA-ACNC: >1000 M[IU]/ML
HBV SURFACE AB SERPL IA-ACNC: REACTIVE M[IU]/ML
HBV SURFACE AG SERPL QL IA: NONREACTIVE
HCT VFR BLD AUTO: 23.4 % (ref 40–53)
HCT VFR BLD AUTO: 23.6 % (ref 40–53)
HCV AB SERPL QL IA: NONREACTIVE
HCV RNA SERPL QL NAA+PROBE: NORMAL
HGB BLD-MCNC: 7.5 G/DL (ref 13.3–17.7)
HGB BLD-MCNC: 7.6 G/DL (ref 13.3–17.7)
HGB UR QL STRIP: NEGATIVE
HIV 1+2 AB+HIV1 P24 AG SERPL QL IA: NONREACTIVE
HIV1+2 RNA SERPL QL NAA+PROBE: NORMAL
HYALINE CASTS: 6 /LPF
IMM GRANULOCYTES # BLD: 0.1 10E3/UL
IMM GRANULOCYTES NFR BLD: 1 %
INR PPP: 1.76 (ref 0.85–1.15)
INTERPRETATION ECG - MUSE: NORMAL
INTERPRETATION ECG - MUSE: NORMAL
ISSUE DATE AND TIME: NORMAL
KETONES UR STRIP-MCNC: NEGATIVE MG/DL
LEUKOCYTE ESTERASE UR QL STRIP: NEGATIVE
LYMPHOCYTES # BLD AUTO: 1.1 10E3/UL (ref 0.8–5.3)
LYMPHOCYTES NFR BLD AUTO: 12 %
MAGNESIUM SERPL-MCNC: 2.7 MG/DL (ref 1.7–2.3)
MCH RBC QN AUTO: 31.9 PG (ref 26.5–33)
MCH RBC QN AUTO: 31.9 PG (ref 26.5–33)
MCHC RBC AUTO-ENTMCNC: 32.1 G/DL (ref 31.5–36.5)
MCHC RBC AUTO-ENTMCNC: 32.2 G/DL (ref 31.5–36.5)
MCV RBC AUTO: 100 FL (ref 78–100)
MCV RBC AUTO: 99 FL (ref 78–100)
MONOCYTES # BLD AUTO: 0.4 10E3/UL (ref 0–1.3)
MONOCYTES NFR BLD AUTO: 4 %
NEUTROPHILS # BLD AUTO: 7.2 10E3/UL (ref 1.6–8.3)
NEUTROPHILS NFR BLD AUTO: 79 %
NITRATE UR QL: NEGATIVE
NRBC # BLD AUTO: 0 10E3/UL
NRBC BLD AUTO-RTO: 0 /100
P AXIS - MUSE: 61 DEGREES
P AXIS - MUSE: 71 DEGREES
PH UR STRIP: 5.5 [PH] (ref 5–7)
PHOSPHATE SERPL-MCNC: 3.1 MG/DL (ref 2.5–4.5)
PLATELET # BLD AUTO: 108 10E3/UL (ref 150–450)
PLATELET # BLD AUTO: 108 10E3/UL (ref 150–450)
POTASSIUM SERPL-SCNC: 4.8 MMOL/L (ref 3.4–5.3)
POTASSIUM SERPL-SCNC: 5 MMOL/L (ref 3.4–5.3)
PR INTERVAL - MUSE: 184 MS
PR INTERVAL - MUSE: 196 MS
PROT SERPL-MCNC: 6.2 G/DL (ref 6.4–8.3)
QRS DURATION - MUSE: 96 MS
QRS DURATION - MUSE: 96 MS
QT - MUSE: 370 MS
QT - MUSE: 422 MS
QTC - MUSE: 455 MS
QTC - MUSE: 462 MS
R AXIS - MUSE: 58 DEGREES
R AXIS - MUSE: 60 DEGREES
RBC # BLD AUTO: 2.35 10E6/UL (ref 4.4–5.9)
RBC # BLD AUTO: 2.38 10E6/UL (ref 4.4–5.9)
RBC URINE: <1 /HPF
SARS-COV-2 RNA RESP QL NAA+PROBE: NEGATIVE
SODIUM SERPL-SCNC: 132 MMOL/L (ref 136–145)
SODIUM SERPL-SCNC: 134 MMOL/L (ref 136–145)
SP GR UR STRIP: 1.01 (ref 1–1.03)
SPECIMEN EXPIRATION DATE: NORMAL
SQUAMOUS EPITHELIAL: <1 /HPF
SYSTOLIC BLOOD PRESSURE - MUSE: NORMAL MMHG
SYSTOLIC BLOOD PRESSURE - MUSE: NORMAL MMHG
T AXIS - MUSE: 26 DEGREES
T AXIS - MUSE: 36 DEGREES
UNIT ABO/RH: NORMAL
UNIT NUMBER: NORMAL
UNIT STATUS: NORMAL
UNIT TYPE ISBT: 600
UROBILINOGEN UR STRIP-MCNC: NORMAL MG/DL
VENTRICULAR RATE- MUSE: 70 BPM
VENTRICULAR RATE- MUSE: 94 BPM
WBC # BLD AUTO: 8.4 10E3/UL (ref 4–11)
WBC # BLD AUTO: 9.1 10E3/UL (ref 4–11)
WBC URINE: 2 /HPF

## 2023-02-27 PROCEDURE — 86901 BLOOD TYPING SEROLOGIC RH(D): CPT | Performed by: PHYSICIAN ASSISTANT

## 2023-02-27 PROCEDURE — 86923 COMPATIBILITY TEST ELECTRIC: CPT | Performed by: STUDENT IN AN ORGANIZED HEALTH CARE EDUCATION/TRAINING PROGRAM

## 2023-02-27 PROCEDURE — 85730 THROMBOPLASTIN TIME PARTIAL: CPT | Performed by: PHYSICIAN ASSISTANT

## 2023-02-27 PROCEDURE — 86644 CMV ANTIBODY: CPT | Performed by: PHYSICIAN ASSISTANT

## 2023-02-27 PROCEDURE — 86665 EPSTEIN-BARR CAPSID VCA: CPT | Performed by: PHYSICIAN ASSISTANT

## 2023-02-27 PROCEDURE — 250N000013 HC RX MED GY IP 250 OP 250 PS 637: Performed by: STUDENT IN AN ORGANIZED HEALTH CARE EDUCATION/TRAINING PROGRAM

## 2023-02-27 PROCEDURE — 85610 PROTHROMBIN TIME: CPT

## 2023-02-27 PROCEDURE — 86850 RBC ANTIBODY SCREEN: CPT | Performed by: PHYSICIAN ASSISTANT

## 2023-02-27 PROCEDURE — 36415 COLL VENOUS BLD VENIPUNCTURE: CPT

## 2023-02-27 PROCEDURE — 87521 HEPATITIS C PROBE&RVRS TRNSC: CPT | Performed by: PHYSICIAN ASSISTANT

## 2023-02-27 PROCEDURE — 93010 ELECTROCARDIOGRAM REPORT: CPT | Performed by: INTERNAL MEDICINE

## 2023-02-27 PROCEDURE — 87340 HEPATITIS B SURFACE AG IA: CPT | Performed by: PHYSICIAN ASSISTANT

## 2023-02-27 PROCEDURE — 85384 FIBRINOGEN ACTIVITY: CPT | Performed by: PHYSICIAN ASSISTANT

## 2023-02-27 PROCEDURE — 80053 COMPREHEN METABOLIC PANEL: CPT

## 2023-02-27 PROCEDURE — 84100 ASSAY OF PHOSPHORUS: CPT | Performed by: PHYSICIAN ASSISTANT

## 2023-02-27 PROCEDURE — 36415 COLL VENOUS BLD VENIPUNCTURE: CPT | Performed by: PHYSICIAN ASSISTANT

## 2023-02-27 PROCEDURE — 250N000013 HC RX MED GY IP 250 OP 250 PS 637

## 2023-02-27 PROCEDURE — 87086 URINE CULTURE/COLONY COUNT: CPT | Performed by: PHYSICIAN ASSISTANT

## 2023-02-27 PROCEDURE — 97535 SELF CARE MNGMENT TRAINING: CPT | Mod: GO

## 2023-02-27 PROCEDURE — 87389 HIV-1 AG W/HIV-1&-2 AB AG IA: CPT | Performed by: PHYSICIAN ASSISTANT

## 2023-02-27 PROCEDURE — 82150 ASSAY OF AMYLASE: CPT | Performed by: PHYSICIAN ASSISTANT

## 2023-02-27 PROCEDURE — U0005 INFEC AGEN DETEC AMPLI PROBE: HCPCS | Performed by: PHYSICIAN ASSISTANT

## 2023-02-27 PROCEDURE — 120N000003 HC R&B IMCU UMMC

## 2023-02-27 PROCEDURE — 81001 URINALYSIS AUTO W/SCOPE: CPT | Performed by: PHYSICIAN ASSISTANT

## 2023-02-27 PROCEDURE — 250N000013 HC RX MED GY IP 250 OP 250 PS 637: Performed by: INTERNAL MEDICINE

## 2023-02-27 PROCEDURE — 93005 ELECTROCARDIOGRAM TRACING: CPT

## 2023-02-27 PROCEDURE — 86923 COMPATIBILITY TEST ELECTRIC: CPT

## 2023-02-27 PROCEDURE — 86803 HEPATITIS C AB TEST: CPT | Performed by: PHYSICIAN ASSISTANT

## 2023-02-27 PROCEDURE — 99232 SBSQ HOSP IP/OBS MODERATE 35: CPT | Mod: GC | Performed by: INTERNAL MEDICINE

## 2023-02-27 PROCEDURE — 85027 COMPLETE CBC AUTOMATED: CPT

## 2023-02-27 PROCEDURE — 250N000011 HC RX IP 250 OP 636

## 2023-02-27 PROCEDURE — 86645 CMV ANTIBODY IGM: CPT | Performed by: PHYSICIAN ASSISTANT

## 2023-02-27 PROCEDURE — 71046 X-RAY EXAM CHEST 2 VIEWS: CPT

## 2023-02-27 PROCEDURE — 71046 X-RAY EXAM CHEST 2 VIEWS: CPT | Mod: 26 | Performed by: RADIOLOGY

## 2023-02-27 PROCEDURE — 83735 ASSAY OF MAGNESIUM: CPT | Performed by: PHYSICIAN ASSISTANT

## 2023-02-27 PROCEDURE — 97110 THERAPEUTIC EXERCISES: CPT | Mod: GO | Performed by: OCCUPATIONAL THERAPIST

## 2023-02-27 PROCEDURE — 86706 HEP B SURFACE ANTIBODY: CPT | Performed by: PHYSICIAN ASSISTANT

## 2023-02-27 PROCEDURE — 250N000011 HC RX IP 250 OP 636: Performed by: STUDENT IN AN ORGANIZED HEALTH CARE EDUCATION/TRAINING PROGRAM

## 2023-02-27 PROCEDURE — 86704 HEP B CORE ANTIBODY TOTAL: CPT | Performed by: PHYSICIAN ASSISTANT

## 2023-02-27 PROCEDURE — 85027 COMPLETE CBC AUTOMATED: CPT | Performed by: PHYSICIAN ASSISTANT

## 2023-02-27 RX ORDER — LIDOCAINE 40 MG/G
CREAM TOPICAL
Status: DISCONTINUED | OUTPATIENT
Start: 2023-02-28 | End: 2023-02-28 | Stop reason: HOSPADM

## 2023-02-27 RX ORDER — PIPERACILLIN SODIUM, TAZOBACTAM SODIUM 3; .375 G/15ML; G/15ML
3.38 INJECTION, POWDER, LYOPHILIZED, FOR SOLUTION INTRAVENOUS ONCE
Status: DISCONTINUED | OUTPATIENT
Start: 2023-02-28 | End: 2023-02-28 | Stop reason: HOSPADM

## 2023-02-27 RX ORDER — FLUCONAZOLE 2 MG/ML
400 INJECTION, SOLUTION INTRAVENOUS ONCE
Status: COMPLETED | OUTPATIENT
Start: 2023-02-28 | End: 2023-02-28

## 2023-02-27 RX ORDER — BUMETANIDE 0.25 MG/ML
1 INJECTION INTRAMUSCULAR; INTRAVENOUS EVERY 6 HOURS
Status: COMPLETED | OUTPATIENT
Start: 2023-02-27 | End: 2023-02-27

## 2023-02-27 RX ORDER — BUMETANIDE 0.25 MG/ML
1 INJECTION INTRAMUSCULAR; INTRAVENOUS EVERY 6 HOURS
Status: DISCONTINUED | OUTPATIENT
Start: 2023-02-27 | End: 2023-02-27

## 2023-02-27 RX ORDER — NOREPINEPHRINE BITARTRATE 0.06 MG/ML
.01-.6 INJECTION, SOLUTION INTRAVENOUS CONTINUOUS
Status: DISCONTINUED | OUTPATIENT
Start: 2023-02-28 | End: 2023-02-28 | Stop reason: HOSPADM

## 2023-02-27 RX ORDER — PIPERACILLIN SODIUM, TAZOBACTAM SODIUM 3; .375 G/15ML; G/15ML
3.38 INJECTION, POWDER, LYOPHILIZED, FOR SOLUTION INTRAVENOUS
Status: DISCONTINUED | OUTPATIENT
Start: 2023-02-28 | End: 2023-02-28 | Stop reason: HOSPADM

## 2023-02-27 RX ADMIN — BUMETANIDE 1 MG: 0.25 INJECTION INTRAMUSCULAR; INTRAVENOUS at 12:55

## 2023-02-27 RX ADMIN — FOLIC ACID 1 MG: 1 TABLET ORAL at 08:33

## 2023-02-27 RX ADMIN — OXYCODONE HYDROCHLORIDE 5 MG: 5 TABLET ORAL at 00:00

## 2023-02-27 RX ADMIN — OXYCODONE HYDROCHLORIDE 5 MG: 5 TABLET ORAL at 12:56

## 2023-02-27 RX ADMIN — FLUOXETINE 60 MG: 20 CAPSULE ORAL at 21:47

## 2023-02-27 RX ADMIN — RIFAXIMIN 550 MG: 550 TABLET ORAL at 20:01

## 2023-02-27 RX ADMIN — THIAMINE HCL TAB 100 MG 100 MG: 100 TAB at 08:33

## 2023-02-27 RX ADMIN — PANTOPRAZOLE SODIUM 40 MG: 40 TABLET, DELAYED RELEASE ORAL at 08:33

## 2023-02-27 RX ADMIN — OXYCODONE HYDROCHLORIDE 5 MG: 5 TABLET ORAL at 23:02

## 2023-02-27 RX ADMIN — RIFAXIMIN 550 MG: 550 TABLET ORAL at 08:33

## 2023-02-27 RX ADMIN — CARVEDILOL 6.25 MG: 6.25 TABLET, FILM COATED ORAL at 08:33

## 2023-02-27 RX ADMIN — ONDANSETRON 4 MG: 2 INJECTION INTRAMUSCULAR; INTRAVENOUS at 01:54

## 2023-02-27 RX ADMIN — CARVEDILOL 6.25 MG: 6.25 TABLET, FILM COATED ORAL at 18:23

## 2023-02-27 RX ADMIN — PANTOPRAZOLE SODIUM 40 MG: 40 TABLET, DELAYED RELEASE ORAL at 20:01

## 2023-02-27 RX ADMIN — THERA TABS 1 TABLET: TAB at 08:33

## 2023-02-27 RX ADMIN — BUMETANIDE 1 MG: 1 TABLET ORAL at 08:33

## 2023-02-27 RX ADMIN — LACTULOSE 20 G: 10 POWDER, FOR SOLUTION ORAL at 10:26

## 2023-02-27 ASSESSMENT — ACTIVITIES OF DAILY LIVING (ADL)
ADLS_ACUITY_SCORE: 33

## 2023-02-27 NOTE — PROGRESS NOTES
GASTROENTEROLOGY PROGRESS NOTE    Date: 02/27/2023     ASSESSMENT: 27 year old male with Asperger's, T2DM, alcohol related cirrhosis c/b hepatic encephalopathy, history of esophageal varices bleeding (5/2022, 1/2023) and rectal varices s/p sclerotherapy 1/2023, ascites presenting with hematemesis. Underwent EGD 2/11 showed oozing portal hypertensive gastropathy. He is now listed for liver transplant.    Decompensated cirrhosis with hepatic encephalopathy, h/o esophageal variceal bleed, HE, MELD-Na 27  Protein calories malnutrition  Follows with Dr. Wood. Listed for liver transplant. Will need to continue chemical dependency after transplant.    ASHISH, resolved. Was getting diuretics given volume overload requiring O2 and improved to room air. Renal following.    Hepatic encephalopathy, resolved    Hypoxia, improving. From volume overload, no sign of pneumonia on CXR, or clinical fever. Has mild leukocytosis .    RECOMMENDATIONS:  - Patient is now listed for liver transplant.  - diuretics and fluid per nephrology  - Minimizel sedation/opioid med use  - lactulose titrate to 3 BMs/day, and rifaximin  - 2 g low sodium diet; ensure high protein intake. PT/OT    Gastroenterology follow up recommendations: Dr. Wood scheduled for 4/18/23 - if gets liver transplant, will move the follow-up further.      Discussed with Dr. Nelson.   Lita Vences MD  Gastroenterology/Hepatology Fellow  _____________________________________________________    Subjective: No overt blood loss. Not confused. Leg swelling improved.On room air without SOB.    Objective:  Blood pressure 97/47, pulse 68, temperature 97.7  F (36.5  C), temperature source Oral, resp. rate 15, weight 79.9 kg (176 lb 2.4 oz), SpO2 93 %.    Gen:NAD, alert and awake  HEENT: jaundice  CV: RRR  Lungs: breathing on O2  Abd: not distended  LE: Swelling both legs  Skin: no jaundice  Neuro: non focal, no asterixis, AAOx4    LABS:   MELD-Na score: 26 at 2/27/2023  4:33  AM  MELD score: 23 at 2/27/2023  4:33 AM  Calculated from:  Serum Creatinine: 1.22 mg/dL at 2/27/2023  4:33 AM  Serum Sodium: 132 mmol/L at 2/27/2023  4:33 AM  Total Bilirubin: 8.5 mg/dL at 2/27/2023  4:33 AM  INR(ratio): 1.76 at 2/27/2023  4:33 AM  Age: 27 years    IMAGING:  Reviewed.  Attestation:  This patient has been seen and evaluated by me, Viola Nelson.  Discussed with the house staff team or resident(s) and agree with the findings and plan in this note.

## 2023-02-27 NOTE — PLAN OF CARE
/50 (BP Location: Left arm)   Pulse 74   Temp 98.1  F (36.7  C) (Oral)   Resp 16   Wt 79.9 kg (176 lb 2.4 oz)   SpO2 93%   BMI 29.31 kg/m      Neuro: A&Ox4. Able to verbalize needs calls appropriately. BUE tremors (baseline).   Cardiac: SR 70s-80s. VSS. Afebrile.               Respiratory: Sating >92% on RA when awake. Intermittent use of 2L NC when asleep to mentain sats >92%.  GI/: Adequate urine output. x1BM loose watery. Using commode and bathroom. Nausea relieved with x1 PRN Zofran.    Diet/appetite: Tolerating 2g K+ diet. Appetite improving.   Activity:  Assist of x1/SBA, up to chair and in halls. Generalized weakness.   Pain: At acceptable level on current regimen. x1 PRN Oxy given with improvement.   Skin: generalized bruising, Scab on upper right chest, bruised/red buttocks/coxyx.   LDA's: x2 PIV (R/L) saline locked.      Plan: Encourage OOB/activity, and IS use. Continue with POC. Notify primary team with changes.

## 2023-02-27 NOTE — TELEPHONE ENCOUNTER
"TRANSPLANT OR REPORT    Organ: Liver  Laterality (if known): N/A  Organ Location: Local    UNOS ID: [id number]  Donor OR Time: 0100 2/28  Expected/Actual Cross Clamp Time: 0230 2/28  Expected Organ Arrival Time: 0400 2/28    Surgeon: Dr. Sterling  Time in OR: 0400 2/28  Time in 3C (N/A for FABIENNE): N/A    Recipient Details  Admission ETA: Already admitted  Unit: 6B  Isolation: None  Latex Allergy: No  : N/A  Diagnosis: ESLD    Liver Transplants  Bypass/Perfusion: Yes  Cellsaver: Yes  Hemodialysis: No  ~ \"RENAL STAFF TEACHING SERVICE MEDICINE\" : Remind LI Fellow to discuss with nephrology on call.  ~ CRRT Resource Nurse: N/A  (Telephone Number for CRRT [phone])    Kidney/Panc Transplants  XM Status (Need to wait for XM?): No    Liver or KP/PA Recipients - Vessel Banking:  Donor has positive serologies for HIV/HCV/HBV: No  Donor has risk criteria for HIV/HCV/HBV: No      Transplant Coordinator Contact Info: Rola [phone]      Vessel Bank Information  Transplant hospitals must not store a donor s extra vessels if the donor has tested positive for any of the following:   - HIV by antibody, antigen, or nucleic acid test (AMBER)   - Hepatitis B surface antigen (HBsAg)   - Hepatitis B (HBV) by AMBER   - Hepatitis C (HCV) by antibody or AMBER     Extra vessels from donors that do not test positive for HIV, HBV, or HCV as above may be stored      "

## 2023-02-27 NOTE — PROGRESS NOTES
"CLINICAL NUTRITION SERVICES - BRIEF NOTE     Nutrition Prescription    RECOMMENDATIONS FOR MDs/PROVIDERS TO ORDER:  If pt undergoes liver transplant, recommend early enteral nutrition if anticipated to remain intubated for >48 hours and/or if unable to progress with PO diet post-op.      Recommendations already ordered by Registered Dietitian (RD):  - Continue nutrition interventions currently in place while on PO diet  - Brief overview of post txp nutrition recommendations/potential need for EN in post-op status pending POC (w/ patient's Mom)     Future/Additional Recommendations:  If patient requires FT placement for enteral nutrition support, see recs below:  Use dosing weight 66 kg (admit wt)  EN access: None at this time (PPFT typically preferred by txp team)  Regimen:   Osmolite 1.5 Tej @ goal of  60ml/hr  (1440ml/day) + 2 pkts Prosource TF20 per day will provide: 2320 kcals (35 Kcal/kg), 130 g PRO (2 gm/kg), 1097 ml free H20, 293 g CHO, and 0 g fiber daily.    - Do not advance TF rate unless Mg++ >1.5, K+ is >3, and phos >1.9  - Initiate @ 10 ml/hr and advance by 10 ml q8hr pending pt's tolerance.  - Recommend 30-60 ml q4hr fluid flushes for tube patency. Additional fluids and/or adjustments per MD.    - Order multivitamin/mineral (15 ml/day via FT) to help ensure micronutrient needs being met with suspected hypermetabolic demands and potential interruptions to TF infusions.   - HOB >30 degrees if FT is gastric.              -Send a nutrition consult for \"Registered Dietitian to Order TF per Medical Nutrition Therapy Guidelines\" if desire RD to order TFs.      Alternatively, if renal formula warranted (pending K+/Phos levels)  Novasource Renal @ 45 ml/hr (1080 ml) + 1 pkt Prosource TF20 provides 2240 kcal (34 Kcal/kg), 118 g pro (1.8 gm/kg), 198 g CHO, 774 ml free water, and 0 g fiber daily.    For last full RD assessment, see note dated 2/24/23    NEW FINDINGS   - Pt officially listed for liver txp as of " 2/22.  RD consult received for pre-op liver transplant; potential surgery tomorrow ~0400 per bedside RN documentation.     - Pt sleeping soundly. Did not wake (already following, most recent visit Friday). Briefly explained potential post-op nutrition course (including potential for nutrition support pending post-op course/diet progression) to Mom and explained that pt will receive nutrition education r/t Food Safety and other post txp nutrition considerations in detail post-txp.     INTERVENTIONS  Implementation  Enteral Nutrition - Recs incase of need   Education - Txp nutrition course    Monitoring/Evaluation  Will continue to monitor and evaluate per protocol.    Charlie Hester RDN, LD, CNSC  6B RD pager: [phone]   6B work-room RD phone: *[phone]    Weekend/Holiday RD pager [phone]

## 2023-02-27 NOTE — PROGRESS NOTES
[facility]    Progress Note - Medicine Service, MAROON TEAM 2       Date of Admission:  2/11/2023    Assessment & Plan   Dillon Salter is a 27 year old male with history of Asperger's, alcoholic cirrhosis c/b esophageal varices and hepatic encephalopathy currently undergoing liver transplant evaluation at [facility], DM II who was directly admitted from [facility] for hematemesis, decompensated cirrhosis and hepatic encephalopathy. Hemodynamically stable with resolved encephalopathy. Course complicated by ASHISH. Completed expedited liver transplant workup with GI, now listed.    Changes Today:  - Remains active on liver transplant list   - strict I/O, weight  - slight increase in Cr today (1.22 from 1.11 yesterday)  - got 1 mg PO bumex this AM, giving 1 mg IV bumex at 1300, will reassess Cr at 1500          ASHISH non-oliguric, improving  Baseline Cr <1, acutely worsened 2/18. Cr peaked at 2.2 on 2/22, currently improving. S/p Fluid challenge, then diuresed successfully with 3 mg Bumex BID and Cr improving. Likely combination of ATN, +/- cardiorenal/congestive physiology that is now improved w/ diuresis.  - Strict I/O  - Was holding Coreg, resumed 2/25  - Diuresis with 1mg PO bumex in the morning + 1 mg IV bumex afternoon, will reassess Cr in the afternoon  - Avoid nephrotoxic agents   - Nephrology following, appreciate recs    Acute hypoxic respiratory failure 2/2 acute pulmonary edema, resolved  Overnight 2/21-2/22 AM patient noticed shortness of breath. CXR with opacities likely related to edema. Improved with diuresis (required 3 mg IV Bumex in setting of ATN).    Acute on chronic blood loss anemia- stable  Hematemesis- resolved  Hx of esophageal varices   Portal hypertensive gastropathy  Esophageal ulceration  Recurrent rectal varices  2-3 episodes hematemesis prior to ED of ~400 mL, ~325 mL at ER, 250 mL anu hematemesis since arrival. HDS. Given 1 unit pRBCs  on 2/12 for Hgb of 6.9. INR 1.48. Esophageal varices recently banded 1/23/23. EGD 2/12 AM: diffuse oozing likely 2/2 portal hypertensive gastropathy, no bleeding seen from recent banding, nonbleeding esophageal ulcers seen. No continued hematemesis. Octreotide was initially given on admission then discontinued 2/13, restarted evening of 2/13 for presumed hematemesis, discontinued 2/14. Transfused for Hgb 6.8 on 2/15 and for Hgb 6.6 on 2/20, 6.8 on 2/25. Iron studies and hemolysis labs for continued downtrend in Hgb- peripheral smear with normochromic macrocytic anemia with acanthocytes, likely related to liver disease.  - trend hgb daily and transfuse for <7  - continue pantoprazole 40mg PO BID  - coreg 6.25mg BID  - GI following    > follow up with GI for a repeat EGD with possible RFA in 3 months    > will need CMP and CBC 1 week after discharge (GI to set up)    Hyperkalemia, resolved  Moderate 6.1 2/19AM, in setting of ASHISH. Has been as high as 6.3 this admission previously thought to be related to potential absorption of blood from gastropathy, and/or insulin deficient state, now most likely elevated in the setting of ASHISH. Has received lokelma and kayexalate on several occassions, shifted with insulin multiple times. No associated EKG changes on multiple checks.  - Trend BMP    Hepatic encephalopathy, resolved  Decompensated alcohol related cirrhosis c/b esophageal varices   Portal HTN and splenomegaly  Coagulopathy due to liver disease  Thrombocytopenia due to liver disease, present on admission  Follows with Dr. Wood. MELD-Na 24 today  Etiology: Alcohol, diagnosed 4/2022  HE: lactulose BID, continue rifaximin  EV/GV: esophageal varices s/p banding 1/23/23, stable on EGD 2/12 without evidence of bleeding; on carvedilol 6.25mg BID, held 2/18 with ASHISH, resumed 2/25  Coagulopathy: INR 1.48,  (suspect 171 on admission was hemoconcentration)  Ascites: None per RUQ 2/12, held PTA bumex 1 mg and spironolactone  given ASHISH -> resumed PO Bumex 2/26 as noted above, still holding spironolactone given prior hyperkalemia  SBP:  no h/o   HCC: last CT 1/2023, no evidence of  Liver transplant candidacy: Currently listed, follows with Dr. Wood.  - GI consulted, appreciate recs  - daily MELD labs  - <2g Na, <2L water, >1.5 g/kg/day protein diet  - Bcx NGTD  - Expedited liver transplant eval per GI, currently listed for transplant    > dobutamine stress test 2/22 WNL    > repeat peth level negative    MELD-Na score: 26 at 2/27/2023  4:33 AM  MELD score: 23 at 2/27/2023  4:33 AM  Calculated from:  Serum Creatinine: 1.22 mg/dL at 2/27/2023  4:33 AM  Serum Sodium: 132 mmol/L at 2/27/2023  4:33 AM  Total Bilirubin: 8.5 mg/dL at 2/27/2023  4:33 AM  INR(ratio): 1.76 at 2/27/2023  4:33 AM  Age: 27 years    Hypoglycemia  Hyperglycemia  DM II - hgb A1c 5.6%  Glargine increased to 28 units BID on 2/18 (home dose is 20 units BID, have been titrating here), briefly held given hypoglycemia then lantus dose was reduced.  - medium dose sliding scale insulin  - hypoglycemia protocol  - carb coverage 1:10 w/ meals and snacks   - lantus 10 units BID    Hypervolemic hyponatremia  - holding spironolactone given hyperkalemia, ASHISH  - diuresis as above  - salt restriction    Lactic acidosis, resolved  Lactate 2.8 on admission. Likely 2/2 hypovolemia in the setting of acute GIB. Given 500 ml w/ K shifting on 2/12, then 1u pRBC.     CHRONIC & STABLE PROBLEMS  MDD/anxiety   Autism spectrum   Mother is his caretaker, lives with parents.   - continue PTA fluoxetine  - previously his mother, Leticia, has been able to stay with patient overnight which helps with mood/anxiety. This was discussed with charge RN    Chronic pain, musculoskeletal  - Continue PTA oxycodone 5mg BID PRN  - Gabapentin not a good option - previously caused excessive drowsiness  - Continue Tylenol, max 2 Gm daily  - Will discuss other agents    Malnutrition:   Level of malnutrition: Severe   -  Nutrition plan (see note from 2/14)  --> Discontinue clear liquid supplements  --> Add Ensure Plus (vanilla) at 2 PM and Special K protein bar once stephen, also at 2 PM per request       Diet: Snacks/Supplements Adult: Other; See comments below; With Meals  Diet  Regular Diet Adult  NPO per Anesthesia Guidelines for Procedure/Surgery Except for: Meds    DVT Prophylaxis: Pneumatic compression device  Negron Catheter: Not present  Fluids: None  Lines: None     Cardiac Monitoring: None  Code Status: Full Code         Disposition Plan    Remains in hospital for liver transplant         The patient's care was discussed with the Attending Physician, Dr. Kapadia.    Narcisa Willard, MS3  Medical Student    Resident/Fellow Attestation   I, Radha Granado MD, was present with the medical/CHRISTA student who participated in the service and in the documentation of the note.  I have verified the history and personally performed the physical exam and medical decision making.  I agree with the assessment and plan of care as documented in the note.      Radha Granado MD  PGY2  Date of Service (when I saw the patient): 02/27/23   __________________________    Interval History   Nursing notes reviewed. No acute events overnight, slept better last night and is less tired this morning than previously. Patient had about 5 BMs yesterday. Was incontinent of urine yesterday as well. Still feels swollen in lower extremities, but thinks this is improving. No new concerns.    Physical Exam   Vital Signs: Temp: 98.2  F (36.8  C) Temp src: Oral BP: 120/59 Pulse: 72   Resp: 16 SpO2: 99 % O2 Device: Nasal cannula Oxygen Delivery: 1.5 LPM  Weight: 176 lbs 2.36 oz    Constitutional: Awake, alert, NAD  Respiratory: Bibasilar crackles   Cardiovascular: Regular rate and rhythm, systolic murmur heard, improving LE edema noted   GI: Normal bowel sounds, soft, non-distended, non-tender  Skin/Integumen: No rashes, no cyanosis      Medical Decision Making       Please see A&P for additional details of medical decision making.      Data   All labs and imaging reviewed

## 2023-02-27 NOTE — PROGRESS NOTES
Informed Consent Process Documentation for Research     Investigation of Liver Transplant Candidate and Recipient Blood Biomarkers of Frailty   PI: Demetrius Hardy MD                                                                                                       ICF Version Date: 01 Nov 2021/ IRB Approval Date: 13 Jul 2022      Date of Approach/Consent: 2/27/2023    The subject was screened and meets all of the inclusion criteria and none of the exclusion criteria is met.    The subject was told:  -that the study involves research   -the purpose of the research study  -the expected duration of the study and the approximate number of subject sought  -of procedures that are identified as experimental  -of reasonably foreseeable risks or discomforts to the subject  -of any benefits to the subject or others that may be expected from the research  -of alternative procedures and/or treatment  -how the confidentiality of records would be maintained  -whether or not compensation and medical treatments are available should injury occur as a result of the study  -who to contact if they have questions related to the research study or questions regarding research subjects' rights  -that participation is completely voluntary and that their decision to or not to participate will have no impact on their relationships with the N and the staff    No study procedures were completed prior to the consent being obtained.    The use of historical information (lab or assessments) used for the purpose of the study was approved by subject.    The subject was fully aware that we would be reviewing their medical record for the study.  The subject demonstrated an understanding of what the study involved. Specifically, how this study differed from standard of care at our center and what was required of the subject as part of the study.  The subject reviewed the consent form and was given the opportunity to ask questions before  signing.  Questions and concerns were answered by the study staff and/or study physician.    A copy of the signed informed consent document was provided to the subject.  [x] Yes [] No  The subject was offered a copy of the signed informed consent but declined. [] Yes [] No    The consent require the use of a :   [] Yes [x]  No     A 'short form' consent was used:     [] Yes [x] No          The subject required a legally-authorized representative (LAR) to sign on their behalf:     [] Yes    [x] No       Questions to Evaluate Subject Comprehension of Study:    Question: Adequate Response? If No, explain what actions were taken   What is being studied? [x] Yes  [] No   If you participate, what will be different than if you decide not to participate?  [x] Yes  [] No   How long will the study last; will you be required to return for visits? [x] Yes  [] No   What kinds of risks are there? [x] Yes  [] No   Do you understand that you can withdraw consent at any time and for any reason while participating in the study? [x] Yes  [] No

## 2023-02-27 NOTE — PLAN OF CARE
Neuro: A&Ox4. Pleasant and hopeful  Cardiac: SR. VSS.   Respiratory: Sating 98 on RA.  GI/: Adequate urine output. Got up to bathroom SBA, BM X1  Diet/appetite: Tolerating reg diet. Eating well. Carb cover insulin  Activity:  SBA, up to chair and in halls, he was sitting in chair most of the day  Pain: At acceptable level on current regimen. Has oxy twice daily, taking second dose before bed  Skin: No new deficits noted. Bruised throughout  LDA's: R and L PIV both SL    Plan: Continue with POC. Notify primary team with changes.

## 2023-02-28 ENCOUNTER — APPOINTMENT (OUTPATIENT)
Dept: GENERAL RADIOLOGY | Facility: CLINIC | Age: 28
DRG: 005 | End: 2023-02-28
Attending: STUDENT IN AN ORGANIZED HEALTH CARE EDUCATION/TRAINING PROGRAM
Payer: COMMERCIAL

## 2023-02-28 ENCOUNTER — APPOINTMENT (OUTPATIENT)
Dept: GENERAL RADIOLOGY | Facility: CLINIC | Age: 28
DRG: 005 | End: 2023-02-28
Attending: SURGERY
Payer: COMMERCIAL

## 2023-02-28 ENCOUNTER — DOCUMENTATION ONLY (OUTPATIENT)
Dept: TRANSPLANT | Facility: CLINIC | Age: 28
End: 2023-02-28
Payer: COMMERCIAL

## 2023-02-28 ENCOUNTER — APPOINTMENT (OUTPATIENT)
Dept: ULTRASOUND IMAGING | Facility: CLINIC | Age: 28
DRG: 005 | End: 2023-02-28
Attending: SURGERY
Payer: COMMERCIAL

## 2023-02-28 LAB
ACANTHOCYTES BLD QL SMEAR: ABNORMAL
ACANTHOCYTES BLD QL SMEAR: ABNORMAL
ALBUMIN SERPL BCG-MCNC: 1.9 G/DL (ref 3.5–5.2)
ALBUMIN SERPL BCG-MCNC: 2.1 G/DL (ref 3.5–5.2)
ALBUMIN SERPL BCG-MCNC: 3.8 G/DL (ref 3.5–5.2)
ALLEN'S TEST: ABNORMAL
ALLEN'S TEST: ABNORMAL
ALP SERPL-CCNC: 111 U/L (ref 40–129)
ALP SERPL-CCNC: 57 U/L (ref 40–129)
ALP SERPL-CCNC: 60 U/L (ref 40–129)
ALT SERPL W P-5'-P-CCNC: 190 U/L (ref 10–50)
ALT SERPL W P-5'-P-CCNC: 194 U/L (ref 10–50)
ALT SERPL W P-5'-P-CCNC: 42 U/L (ref 10–50)
ANION GAP SERPL CALCULATED.3IONS-SCNC: 10 MMOL/L (ref 7–15)
ANION GAP SERPL CALCULATED.3IONS-SCNC: 10 MMOL/L (ref 7–15)
ANION GAP SERPL CALCULATED.3IONS-SCNC: 11 MMOL/L (ref 7–15)
ANION GAP SERPL CALCULATED.3IONS-SCNC: 9 MMOL/L (ref 7–15)
APTT PPP: 108 SECONDS (ref 22–38)
APTT PPP: 36 SECONDS (ref 22–38)
APTT PPP: 46 SECONDS (ref 22–38)
APTT PPP: 48 SECONDS (ref 22–38)
AST SERPL W P-5'-P-CCNC: 569 U/L (ref 10–50)
AST SERPL W P-5'-P-CCNC: 633 U/L (ref 10–50)
AST SERPL W P-5'-P-CCNC: 83 U/L (ref 10–50)
BASE EXCESS BLDA CALC-SCNC: 0.8 MMOL/L (ref -9.6–2)
BASE EXCESS BLDA CALC-SCNC: 1.3 MMOL/L (ref -9.6–2)
BASE EXCESS BLDA CALC-SCNC: 10.1 MMOL/L (ref -9.6–2)
BASE EXCESS BLDA CALC-SCNC: 10.5 MMOL/L (ref -9.6–2)
BASE EXCESS BLDA CALC-SCNC: 11 MMOL/L (ref -9.6–2)
BASE EXCESS BLDA CALC-SCNC: 2.9 MMOL/L (ref -9.6–2)
BASE EXCESS BLDA CALC-SCNC: 5.2 MMOL/L (ref -9–1.8)
BASE EXCESS BLDA CALC-SCNC: 6 MMOL/L (ref -9.6–2)
BASE EXCESS BLDA CALC-SCNC: 7.1 MMOL/L (ref -9.6–2)
BASE EXCESS BLDA CALC-SCNC: 7.9 MMOL/L (ref -9.6–2)
BASE EXCESS BLDA CALC-SCNC: 8 MMOL/L (ref -9–1.8)
BASE EXCESS BLDV CALC-SCNC: 9.2 MMOL/L (ref -7.7–1.9)
BASOPHILS # BLD MANUAL: 0 10E3/UL (ref 0–0.2)
BASOPHILS # BLD MANUAL: 0 10E3/UL (ref 0–0.2)
BASOPHILS NFR BLD MANUAL: 0 %
BASOPHILS NFR BLD MANUAL: 0 %
BILIRUB DIRECT SERPL-MCNC: 3.64 MG/DL (ref 0–0.3)
BILIRUB SERPL-MCNC: 6.9 MG/DL
BILIRUB SERPL-MCNC: 7.2 MG/DL
BILIRUB SERPL-MCNC: 8.9 MG/DL
BLD PROD TYP BPU: NORMAL
BLOOD COMPONENT TYPE: NORMAL
BUN SERPL-MCNC: 50.8 MG/DL (ref 6–20)
BUN SERPL-MCNC: 57.2 MG/DL (ref 6–20)
BUN SERPL-MCNC: 57.9 MG/DL (ref 6–20)
BUN SERPL-MCNC: 79.2 MG/DL (ref 6–20)
CA-I BLD-MCNC: 2.9 MG/DL (ref 4.4–5.2)
CA-I BLD-MCNC: 4.3 MG/DL (ref 4.4–5.2)
CA-I BLD-MCNC: 4.6 MG/DL (ref 4.4–5.2)
CA-I BLD-MCNC: 4.6 MG/DL (ref 4.4–5.2)
CA-I BLD-MCNC: 4.8 MG/DL (ref 4.4–5.2)
CA-I BLD-MCNC: 5 MG/DL (ref 4.4–5.2)
CA-I BLD-MCNC: 5 MG/DL (ref 4.4–5.2)
CA-I BLD-MCNC: 5.1 MG/DL (ref 4.4–5.2)
CA-I BLD-MCNC: 5.1 MG/DL (ref 4.4–5.2)
CALCIUM SERPL-MCNC: 9.2 MG/DL (ref 8.6–10)
CALCIUM SERPL-MCNC: 9.7 MG/DL (ref 8.6–10)
CALCIUM SERPL-MCNC: 9.8 MG/DL (ref 8.6–10)
CALCIUM SERPL-MCNC: 9.9 MG/DL (ref 8.6–10)
CHLORIDE SERPL-SCNC: 101 MMOL/L (ref 98–107)
CHLORIDE SERPL-SCNC: 101 MMOL/L (ref 98–107)
CHLORIDE SERPL-SCNC: 102 MMOL/L (ref 98–107)
CHLORIDE SERPL-SCNC: 90 MMOL/L (ref 98–107)
CLOT INIT KAOL IND TO POST HEP NEUT TRTO: 1 {RATIO}
CLOT INIT KAOL IND TO POST HEP NEUT TRTO: 1 {RATIO}
CLOT INIT KAOL IND TO POST HEP NEUT TRTO: 1.1 {RATIO}
CLOT INIT KAOLIN IND BLD US: 163 SEC (ref 113–166)
CLOT INIT KAOLIN IND BLD US: 166 SEC (ref 113–166)
CLOT INIT KAOLIN IND BLD US: 167 SEC (ref 113–166)
CLOT INIT KAOLIN IND BLD US: 183 SEC (ref 113–166)
CLOT INIT KAOLIN IND BLD US: 191 SEC (ref 113–166)
CLOT INIT KAOLIN IND BLD US: 194 SEC (ref 113–166)
CLOT INIT KAOLIN IND BLD US: 198 SEC (ref 113–166)
CLOT INIT KAOLIN IND BLD US: 296 SEC (ref 113–166)
CLOT INIT KAOLIN IND P HEP NEUT BLD US: 155 SEC (ref 103–153)
CLOT INIT KAOLIN IND P HEP NEUT BLD US: 160 SEC (ref 103–153)
CLOT INIT KAOLIN IND P HEP NEUT BLD US: 160 SEC (ref 103–153)
CLOT INIT KAOLIN IND P HEP NEUT BLD US: 172 SEC (ref 103–153)
CLOT INIT KAOLIN IND P HEP NEUT BLD US: 176 SEC (ref 103–153)
CLOT INIT KAOLIN IND P HEP NEUT BLD US: 176 SEC (ref 103–153)
CLOT INIT KAOLIN IND P HEP NEUT BLD US: 179 SEC (ref 103–153)
CLOT INIT KAOLIN IND P HEP NEUT BLD US: 261 SEC (ref 103–153)
CLOT STIFF PLT CONT BLD CALC: 10.5 HPA (ref 11.9–29.8)
CLOT STIFF PLT CONT BLD CALC: 10.5 HPA (ref 11.9–29.8)
CLOT STIFF PLT CONT BLD CALC: 10.9 HPA (ref 11.9–29.8)
CLOT STIFF PLT CONT BLD CALC: 11 HPA (ref 11.9–29.8)
CLOT STIFF PLT CONT BLD CALC: 11 HPA (ref 11.9–29.8)
CLOT STIFF PLT CONT BLD CALC: 11.5 HPA (ref 11.9–29.8)
CLOT STIFF PLT CONT BLD CALC: 3.6 HPA (ref 11.9–29.8)
CLOT STIFF PLT CONT BLD CALC: ABNORMAL HPA
CLOT STIFF TF IND P HEP NEUT BLD US: 11.9 HPA (ref 13–33.2)
CLOT STIFF TF IND P HEP NEUT BLD US: 12.1 HPA (ref 13–33.2)
CLOT STIFF TF IND P HEP NEUT BLD US: 12.2 HPA (ref 13–33.2)
CLOT STIFF TF IND P HEP NEUT BLD US: 12.5 HPA (ref 13–33.2)
CLOT STIFF TF IND P HEP NEUT BLD US: 12.6 HPA (ref 13–33.2)
CLOT STIFF TF IND P HEP NEUT BLD US: 13.3 HPA (ref 13–33.2)
CLOT STIFF TF IND P HEP NEUT BLD US: 4 HPA (ref 13–33.2)
CLOT STIFF TF IND P HEP NEUT BLD US: ABNORMAL HPA
CLOT STIFF TF IND+IIB-IIIA INH P HEP NEU: 0.4 HPA (ref 1–3.7)
CLOT STIFF TF IND+IIB-IIIA INH P HEP NEU: 1 HPA (ref 1–3.7)
CLOT STIFF TF IND+IIB-IIIA INH P HEP NEU: 1.5 HPA (ref 1–3.7)
CLOT STIFF TF IND+IIB-IIIA INH P HEP NEU: 1.6 HPA (ref 1–3.7)
CLOT STIFF TF IND+IIB-IIIA INH P HEP NEU: 1.6 HPA (ref 1–3.7)
CLOT STIFF TF IND+IIB-IIIA INH P HEP NEU: 1.7 HPA (ref 1–3.7)
CLOT STIFF TF IND+IIB-IIIA INH P HEP NEU: 1.8 HPA (ref 1–3.7)
CLOT STIFF TF IND+IIB-IIIA INH P HEP NEU: ABNORMAL HPA
CODING SYSTEM: NORMAL
CREAT SERPL-MCNC: 1.2 MG/DL (ref 0.67–1.17)
CREAT SERPL-MCNC: 1.32 MG/DL (ref 0.67–1.17)
CREAT SERPL-MCNC: 1.39 MG/DL (ref 0.67–1.17)
CREAT SERPL-MCNC: 1.71 MG/DL (ref 0.67–1.17)
CROSSMATCH: NORMAL
DEPRECATED HCO3 PLAS-SCNC: 26 MMOL/L (ref 22–29)
DEPRECATED HCO3 PLAS-SCNC: 27 MMOL/L (ref 22–29)
DEPRECATED HCO3 PLAS-SCNC: 28 MMOL/L (ref 22–29)
DEPRECATED HCO3 PLAS-SCNC: 32 MMOL/L (ref 22–29)
EOSINOPHIL # BLD MANUAL: 0 10E3/UL (ref 0–0.7)
EOSINOPHIL # BLD MANUAL: 0 10E3/UL (ref 0–0.7)
EOSINOPHIL NFR BLD MANUAL: 0 %
EOSINOPHIL NFR BLD MANUAL: 0 %
ERYTHROCYTE [DISTWIDTH] IN BLOOD BY AUTOMATED COUNT: 15 % (ref 10–15)
ERYTHROCYTE [DISTWIDTH] IN BLOOD BY AUTOMATED COUNT: 15.4 % (ref 10–15)
ERYTHROCYTE [DISTWIDTH] IN BLOOD BY AUTOMATED COUNT: 15.5 % (ref 10–15)
ERYTHROCYTE [DISTWIDTH] IN BLOOD BY AUTOMATED COUNT: 20.6 % (ref 10–15)
FIBRINOGEN PPP-MCNC: 164 MG/DL (ref 170–490)
FIBRINOGEN PPP-MCNC: 182 MG/DL (ref 170–490)
FIBRINOGEN PPP-MCNC: 217 MG/DL (ref 170–490)
FIBRINOGEN PPP-MCNC: 65 MG/DL (ref 170–490)
GFR SERPL CREATININE-BSD FRML MDRD: 55 ML/MIN/1.73M2
GFR SERPL CREATININE-BSD FRML MDRD: 70 ML/MIN/1.73M2
GFR SERPL CREATININE-BSD FRML MDRD: 75 ML/MIN/1.73M2
GFR SERPL CREATININE-BSD FRML MDRD: 84 ML/MIN/1.73M2
GLUCOSE BLD-MCNC: 111 MG/DL (ref 70–99)
GLUCOSE BLD-MCNC: 120 MG/DL (ref 70–99)
GLUCOSE BLD-MCNC: 120 MG/DL (ref 70–99)
GLUCOSE BLD-MCNC: 140 MG/DL (ref 70–99)
GLUCOSE BLD-MCNC: 161 MG/DL (ref 70–99)
GLUCOSE BLD-MCNC: 162 MG/DL (ref 70–99)
GLUCOSE BLD-MCNC: 55 MG/DL (ref 70–99)
GLUCOSE BLD-MCNC: 94 MG/DL (ref 70–99)
GLUCOSE BLD-MCNC: 99 MG/DL (ref 70–99)
GLUCOSE BLDC GLUCOMTR-MCNC: 102 MG/DL (ref 70–99)
GLUCOSE BLDC GLUCOMTR-MCNC: 107 MG/DL (ref 70–99)
GLUCOSE BLDC GLUCOMTR-MCNC: 114 MG/DL (ref 70–99)
GLUCOSE BLDC GLUCOMTR-MCNC: 119 MG/DL (ref 70–99)
GLUCOSE BLDC GLUCOMTR-MCNC: 148 MG/DL (ref 70–99)
GLUCOSE BLDC GLUCOMTR-MCNC: 97 MG/DL (ref 70–99)
GLUCOSE SERPL-MCNC: 113 MG/DL (ref 70–99)
GLUCOSE SERPL-MCNC: 114 MG/DL (ref 70–99)
GLUCOSE SERPL-MCNC: 151 MG/DL (ref 70–99)
GLUCOSE SERPL-MCNC: 77 MG/DL (ref 70–99)
HBA1C MFR BLD: 6.1 %
HCO3 BLD-SCNC: 28 MMOL/L (ref 21–28)
HCO3 BLD-SCNC: 32 MMOL/L (ref 21–28)
HCO3 BLDA-SCNC: 26 MMOL/L (ref 21–28)
HCO3 BLDA-SCNC: 26 MMOL/L (ref 21–28)
HCO3 BLDA-SCNC: 27 MMOL/L (ref 21–28)
HCO3 BLDA-SCNC: 30 MMOL/L (ref 21–28)
HCO3 BLDA-SCNC: 31 MMOL/L (ref 21–28)
HCO3 BLDA-SCNC: 32 MMOL/L (ref 21–28)
HCO3 BLDA-SCNC: 33 MMOL/L (ref 21–28)
HCO3 BLDA-SCNC: 34 MMOL/L (ref 21–28)
HCO3 BLDA-SCNC: 35 MMOL/L (ref 21–28)
HCO3 BLDV-SCNC: 33 MMOL/L (ref 21–28)
HCT VFR BLD AUTO: 21.6 % (ref 40–53)
HCT VFR BLD AUTO: 22.3 % (ref 40–53)
HCT VFR BLD AUTO: 27.6 % (ref 40–53)
HCT VFR BLD AUTO: 28.4 % (ref 40–53)
HGB BLD-MCNC: 10.1 G/DL (ref 13.3–17.7)
HGB BLD-MCNC: 10.9 G/DL (ref 13.3–17.7)
HGB BLD-MCNC: 12.2 G/DL (ref 13.3–17.7)
HGB BLD-MCNC: 6.8 G/DL (ref 13.3–17.7)
HGB BLD-MCNC: 7.8 G/DL (ref 13.3–17.7)
HGB BLD-MCNC: 8 G/DL (ref 13.3–17.7)
HGB BLD-MCNC: 8.1 G/DL (ref 13.3–17.7)
HGB BLD-MCNC: 8.2 G/DL (ref 13.3–17.7)
HGB BLD-MCNC: 8.5 G/DL (ref 13.3–17.7)
HGB BLD-MCNC: 8.8 G/DL (ref 13.3–17.7)
HGB BLD-MCNC: 9.2 G/DL (ref 13.3–17.7)
HGB BLD-MCNC: 9.6 G/DL (ref 13.3–17.7)
HGB BLD-MCNC: 9.8 G/DL (ref 13.3–17.7)
HOLD SPECIMEN: NORMAL
INR PPP: 1.62 (ref 0.85–1.15)
INR PPP: 1.76 (ref 0.85–1.15)
INR PPP: 1.86 (ref 0.85–1.15)
INR PPP: 1.91 (ref 0.85–1.15)
INR PPP: 2.12 (ref 0.85–1.15)
ISSUE DATE AND TIME: NORMAL
LACTATE BLD-SCNC: 1.7 MMOL/L
LACTATE BLD-SCNC: 1.7 MMOL/L
LACTATE BLD-SCNC: 1.8 MMOL/L
LACTATE BLD-SCNC: 2.3 MMOL/L
LACTATE BLD-SCNC: 2.5 MMOL/L
LACTATE BLD-SCNC: 2.6 MMOL/L
LACTATE BLD-SCNC: 3 MMOL/L
LACTATE BLD-SCNC: 3.1 MMOL/L
LACTATE BLD-SCNC: 4.3 MMOL/L
LACTATE SERPL-SCNC: 1.2 MMOL/L (ref 0.7–2)
LACTATE SERPL-SCNC: 1.4 MMOL/L (ref 0.7–2)
LYMPHOCYTES # BLD MANUAL: 0.1 10E3/UL (ref 0.8–5.3)
LYMPHOCYTES # BLD MANUAL: 0.2 10E3/UL (ref 0.8–5.3)
LYMPHOCYTES NFR BLD MANUAL: 1 %
LYMPHOCYTES NFR BLD MANUAL: 3 %
MAGNESIUM SERPL-MCNC: 1.7 MG/DL (ref 1.7–2.3)
MCH RBC QN AUTO: 30.1 PG (ref 26.5–33)
MCH RBC QN AUTO: 30.2 PG (ref 26.5–33)
MCH RBC QN AUTO: 30.5 PG (ref 26.5–33)
MCH RBC QN AUTO: 31.2 PG (ref 26.5–33)
MCHC RBC AUTO-ENTMCNC: 31.5 G/DL (ref 31.5–36.5)
MCHC RBC AUTO-ENTMCNC: 34.5 G/DL (ref 31.5–36.5)
MCHC RBC AUTO-ENTMCNC: 34.8 G/DL (ref 31.5–36.5)
MCHC RBC AUTO-ENTMCNC: 35 G/DL (ref 31.5–36.5)
MCV RBC AUTO: 86 FL (ref 78–100)
MCV RBC AUTO: 87 FL (ref 78–100)
MCV RBC AUTO: 89 FL (ref 78–100)
MCV RBC AUTO: 99 FL (ref 78–100)
MONOCYTES # BLD MANUAL: 0.1 10E3/UL (ref 0–1.3)
MONOCYTES # BLD MANUAL: 0.2 10E3/UL (ref 0–1.3)
MONOCYTES NFR BLD MANUAL: 2 %
MONOCYTES NFR BLD MANUAL: 4 %
MRSA DNA SPEC QL NAA+PROBE: NEGATIVE
NEUTROPHILS # BLD MANUAL: 5 10E3/UL (ref 1.6–8.3)
NEUTROPHILS # BLD MANUAL: 6 10E3/UL (ref 1.6–8.3)
NEUTROPHILS NFR BLD MANUAL: 93 %
NEUTROPHILS NFR BLD MANUAL: 97 %
NRBC # BLD AUTO: 0.1 10E3/UL
NRBC BLD MANUAL-RTO: 1 %
O2/TOTAL GAS SETTING VFR VENT: 40 %
O2/TOTAL GAS SETTING VFR VENT: 40 %
O2/TOTAL GAS SETTING VFR VENT: 43 %
O2/TOTAL GAS SETTING VFR VENT: 50 %
O2/TOTAL GAS SETTING VFR VENT: 70 %
O2/TOTAL GAS SETTING VFR VENT: 74 %
O2/TOTAL GAS SETTING VFR VENT: 75 %
O2/TOTAL GAS SETTING VFR VENT: 80 %
OXYHGB MFR BLDV: 56 % (ref 70–75)
PCO2 BLD: 31 MM HG (ref 35–45)
PCO2 BLD: 40 MM HG (ref 35–45)
PCO2 BLDA: 33 MM HG (ref 35–45)
PCO2 BLDA: 36 MM HG (ref 35–45)
PCO2 BLDA: 40 MM HG (ref 35–45)
PCO2 BLDA: 41 MM HG (ref 35–45)
PCO2 BLDA: 42 MM HG (ref 35–45)
PCO2 BLDA: 45 MM HG (ref 35–45)
PCO2 BLDA: 45 MM HG (ref 35–45)
PCO2 BLDV: 43 MM HG (ref 40–50)
PH BLD: 7.51 [PH] (ref 7.35–7.45)
PH BLD: 7.56 [PH] (ref 7.35–7.45)
PH BLDA: 7.4 [PH] (ref 7.35–7.45)
PH BLDA: 7.41 [PH] (ref 7.35–7.45)
PH BLDA: 7.44 [PH] (ref 7.35–7.45)
PH BLDA: 7.45 [PH] (ref 7.35–7.45)
PH BLDA: 7.49 [PH] (ref 7.35–7.45)
PH BLDA: 7.5 [PH] (ref 7.35–7.45)
PH BLDA: 7.53 [PH] (ref 7.35–7.45)
PH BLDA: 7.55 [PH] (ref 7.35–7.45)
PH BLDA: 7.6 [PH] (ref 7.35–7.45)
PH BLDV: 7.5 [PH] (ref 7.32–7.43)
PHOSPHATE SERPL-MCNC: 4.7 MG/DL (ref 2.5–4.5)
PLAT MORPH BLD: ABNORMAL
PLAT MORPH BLD: ABNORMAL
PLATELET # BLD AUTO: 114 10E3/UL (ref 150–450)
PLATELET # BLD AUTO: 42 10E3/UL (ref 150–450)
PLATELET # BLD AUTO: 93 10E3/UL (ref 150–450)
PLATELET # BLD AUTO: 94 10E3/UL (ref 150–450)
PLATELET # BLD AUTO: 97 10E3/UL (ref 150–450)
PO2 BLD: 137 MM HG (ref 80–105)
PO2 BLD: 175 MM HG (ref 80–105)
PO2 BLDA: 263 MM HG (ref 80–105)
PO2 BLDA: 312 MM HG (ref 80–105)
PO2 BLDA: 327 MM HG (ref 80–105)
PO2 BLDA: 360 MM HG (ref 80–105)
PO2 BLDA: 369 MM HG (ref 80–105)
PO2 BLDA: 371 MM HG (ref 80–105)
PO2 BLDA: 371 MM HG (ref 80–105)
PO2 BLDA: 405 MM HG (ref 80–105)
PO2 BLDA: 64 MM HG (ref 80–105)
PO2 BLDV: 27 MM HG (ref 25–47)
POLYCHROMASIA BLD QL SMEAR: SLIGHT
POLYCHROMASIA BLD QL SMEAR: SLIGHT
POTASSIUM BLD-SCNC: 4 MMOL/L (ref 3.5–5)
POTASSIUM BLD-SCNC: 4.2 MMOL/L (ref 3.5–5)
POTASSIUM BLD-SCNC: 4.3 MMOL/L (ref 3.5–5)
POTASSIUM BLD-SCNC: 4.4 MMOL/L (ref 3.5–5)
POTASSIUM BLD-SCNC: 4.5 MMOL/L (ref 3.5–5)
POTASSIUM BLD-SCNC: 4.7 MMOL/L (ref 3.5–5)
POTASSIUM BLD-SCNC: 5 MMOL/L (ref 3.5–5)
POTASSIUM SERPL-SCNC: 4.6 MMOL/L (ref 3.4–5.3)
POTASSIUM SERPL-SCNC: 4.6 MMOL/L (ref 3.4–5.3)
POTASSIUM SERPL-SCNC: 5.2 MMOL/L (ref 3.4–5.3)
POTASSIUM SERPL-SCNC: 5.3 MMOL/L (ref 3.4–5.3)
PROT SERPL-MCNC: 3 G/DL (ref 6.4–8.3)
PROT SERPL-MCNC: 3.2 G/DL (ref 6.4–8.3)
PROT SERPL-MCNC: 6 G/DL (ref 6.4–8.3)
RBC # BLD AUTO: 2.18 10E6/UL (ref 4.4–5.9)
RBC # BLD AUTO: 2.59 10E6/UL (ref 4.4–5.9)
RBC # BLD AUTO: 3.18 10E6/UL (ref 4.4–5.9)
RBC # BLD AUTO: 3.21 10E6/UL (ref 4.4–5.9)
RBC MORPH BLD: ABNORMAL
RBC MORPH BLD: ABNORMAL
SA TARGET DNA: NEGATIVE
SODIUM BLD-SCNC: 132 MMOL/L (ref 133–144)
SODIUM BLD-SCNC: 133 MMOL/L (ref 133–144)
SODIUM BLD-SCNC: 133 MMOL/L (ref 133–144)
SODIUM BLD-SCNC: 134 MMOL/L (ref 133–144)
SODIUM BLD-SCNC: 135 MMOL/L (ref 133–144)
SODIUM BLD-SCNC: 136 MMOL/L (ref 133–144)
SODIUM BLD-SCNC: 138 MMOL/L (ref 133–144)
SODIUM SERPL-SCNC: 132 MMOL/L (ref 136–145)
SODIUM SERPL-SCNC: 138 MMOL/L (ref 136–145)
SODIUM SERPL-SCNC: 138 MMOL/L (ref 136–145)
SODIUM SERPL-SCNC: 139 MMOL/L (ref 136–145)
TOXIC GRANULES BLD QL SMEAR: PRESENT
UNIT ABO/RH: NORMAL
UNIT NUMBER: NORMAL
UNIT STATUS: NORMAL
UNIT TYPE ISBT: 2800
UNIT TYPE ISBT: 5100
UNIT TYPE ISBT: 600
UNIT TYPE ISBT: 6200
UNIT TYPE ISBT: 8400
WBC # BLD AUTO: 5.4 10E3/UL (ref 4–11)
WBC # BLD AUTO: 6.2 10E3/UL (ref 4–11)
WBC # BLD AUTO: 7.6 10E3/UL (ref 4–11)
WBC # BLD AUTO: 7.6 10E3/UL (ref 4–11)

## 2023-02-28 PROCEDURE — 250N000011 HC RX IP 250 OP 636: Performed by: STUDENT IN AN ORGANIZED HEALTH CARE EDUCATION/TRAINING PROGRAM

## 2023-02-28 PROCEDURE — 258N000003 HC RX IP 258 OP 636: Performed by: STUDENT IN AN ORGANIZED HEALTH CARE EDUCATION/TRAINING PROGRAM

## 2023-02-28 PROCEDURE — 250N000009 HC RX 250: Performed by: STUDENT IN AN ORGANIZED HEALTH CARE EDUCATION/TRAINING PROGRAM

## 2023-02-28 PROCEDURE — 84132 ASSAY OF SERUM POTASSIUM: CPT

## 2023-02-28 PROCEDURE — 258N000001 HC RX 258: Performed by: STUDENT IN AN ORGANIZED HEALTH CARE EDUCATION/TRAINING PROGRAM

## 2023-02-28 PROCEDURE — 85384 FIBRINOGEN ACTIVITY: CPT | Performed by: SURGERY

## 2023-02-28 PROCEDURE — 85384 FIBRINOGEN ACTIVITY: CPT | Performed by: INTERNAL MEDICINE

## 2023-02-28 PROCEDURE — P9016 RBC LEUKOCYTES REDUCED: HCPCS | Performed by: STUDENT IN AN ORGANIZED HEALTH CARE EDUCATION/TRAINING PROGRAM

## 2023-02-28 PROCEDURE — 87641 MR-STAPH DNA AMP PROBE: CPT | Performed by: SURGERY

## 2023-02-28 PROCEDURE — 999N000065 XR ABDOMEN PORT 1 VIEW

## 2023-02-28 PROCEDURE — 80053 COMPREHEN METABOLIC PANEL: CPT | Performed by: INTERNAL MEDICINE

## 2023-02-28 PROCEDURE — 85610 PROTHROMBIN TIME: CPT | Performed by: STUDENT IN AN ORGANIZED HEALTH CARE EDUCATION/TRAINING PROGRAM

## 2023-02-28 PROCEDURE — 82330 ASSAY OF CALCIUM: CPT

## 2023-02-28 PROCEDURE — P9016 RBC LEUKOCYTES REDUCED: HCPCS

## 2023-02-28 PROCEDURE — 999N000065 XR CHEST PORT 1 VIEW

## 2023-02-28 PROCEDURE — 88309 TISSUE EXAM BY PATHOLOGIST: CPT | Mod: 26 | Performed by: STUDENT IN AN ORGANIZED HEALTH CARE EDUCATION/TRAINING PROGRAM

## 2023-02-28 PROCEDURE — 82803 BLOOD GASES ANY COMBINATION: CPT

## 2023-02-28 PROCEDURE — 85007 BL SMEAR W/DIFF WBC COUNT: CPT | Performed by: SURGERY

## 2023-02-28 PROCEDURE — 200N000002 HC R&B ICU UMMC

## 2023-02-28 PROCEDURE — 85027 COMPLETE CBC AUTOMATED: CPT | Performed by: INTERNAL MEDICINE

## 2023-02-28 PROCEDURE — 71045 X-RAY EXAM CHEST 1 VIEW: CPT | Mod: 26 | Performed by: RADIOLOGY

## 2023-02-28 PROCEDURE — 999N000157 HC STATISTIC RCP TIME EA 10 MIN

## 2023-02-28 PROCEDURE — 370N000017 HC ANESTHESIA TECHNICAL FEE, PER MIN: Performed by: SURGERY

## 2023-02-28 PROCEDURE — 250N000013 HC RX MED GY IP 250 OP 250 PS 637: Performed by: SURGERY

## 2023-02-28 PROCEDURE — 258N000001 HC RX 258

## 2023-02-28 PROCEDURE — 74018 RADEX ABDOMEN 1 VIEW: CPT | Mod: 26 | Performed by: RADIOLOGY

## 2023-02-28 PROCEDURE — 84155 ASSAY OF PROTEIN SERUM: CPT | Performed by: STUDENT IN AN ORGANIZED HEALTH CARE EDUCATION/TRAINING PROGRAM

## 2023-02-28 PROCEDURE — 250N000009 HC RX 250: Performed by: SURGERY

## 2023-02-28 PROCEDURE — 85396 CLOTTING ASSAY WHOLE BLOOD: CPT

## 2023-02-28 PROCEDURE — 250N000011 HC RX IP 250 OP 636: Performed by: PHYSICIAN ASSISTANT

## 2023-02-28 PROCEDURE — 94002 VENT MGMT INPAT INIT DAY: CPT

## 2023-02-28 PROCEDURE — 85384 FIBRINOGEN ACTIVITY: CPT | Performed by: STUDENT IN AN ORGANIZED HEALTH CARE EDUCATION/TRAINING PROGRAM

## 2023-02-28 PROCEDURE — 82803 BLOOD GASES ANY COMBINATION: CPT | Performed by: SURGERY

## 2023-02-28 PROCEDURE — 0W3Q0ZZ CONTROL BLEEDING IN RESPIRATORY TRACT, OPEN APPROACH: ICD-10-PCS | Performed by: SURGERY

## 2023-02-28 PROCEDURE — P9059 PLASMA, FRZ BETWEEN 8-24HOUR: HCPCS

## 2023-02-28 PROCEDURE — 812N000006 HC ACQUISITION LIVER CADAVER DONOR

## 2023-02-28 PROCEDURE — C9113 INJ PANTOPRAZOLE SODIUM, VIA: HCPCS | Performed by: STUDENT IN AN ORGANIZED HEALTH CARE EDUCATION/TRAINING PROGRAM

## 2023-02-28 PROCEDURE — 88313 SPECIAL STAINS GROUP 2: CPT | Mod: TC | Performed by: SURGERY

## 2023-02-28 PROCEDURE — 85027 COMPLETE CBC AUTOMATED: CPT | Performed by: SURGERY

## 2023-02-28 PROCEDURE — 250N000011 HC RX IP 250 OP 636: Performed by: SURGERY

## 2023-02-28 PROCEDURE — 258N000003 HC RX IP 258 OP 636: Performed by: PHYSICIAN ASSISTANT

## 2023-02-28 PROCEDURE — 250N000012 HC RX MED GY IP 250 OP 636 PS 637: Performed by: SURGERY

## 2023-02-28 PROCEDURE — 83735 ASSAY OF MAGNESIUM: CPT | Performed by: STUDENT IN AN ORGANIZED HEALTH CARE EDUCATION/TRAINING PROGRAM

## 2023-02-28 PROCEDURE — 82248 BILIRUBIN DIRECT: CPT | Performed by: SURGERY

## 2023-02-28 PROCEDURE — 85610 PROTHROMBIN TIME: CPT | Performed by: INTERNAL MEDICINE

## 2023-02-28 PROCEDURE — 85610 PROTHROMBIN TIME: CPT | Performed by: SURGERY

## 2023-02-28 PROCEDURE — 88304 TISSUE EXAM BY PATHOLOGIST: CPT | Mod: 26 | Performed by: STUDENT IN AN ORGANIZED HEALTH CARE EDUCATION/TRAINING PROGRAM

## 2023-02-28 PROCEDURE — 83605 ASSAY OF LACTIC ACID: CPT | Performed by: SURGERY

## 2023-02-28 PROCEDURE — P9045 ALBUMIN (HUMAN), 5%, 250 ML: HCPCS | Performed by: STUDENT IN AN ORGANIZED HEALTH CARE EDUCATION/TRAINING PROGRAM

## 2023-02-28 PROCEDURE — 0FY00Z0 TRANSPLANTATION OF LIVER, ALLOGENEIC, OPEN APPROACH: ICD-10-PCS | Performed by: SURGERY

## 2023-02-28 PROCEDURE — 84132 ASSAY OF SERUM POTASSIUM: CPT | Performed by: SURGERY

## 2023-02-28 PROCEDURE — 84100 ASSAY OF PHOSPHORUS: CPT | Performed by: STUDENT IN AN ORGANIZED HEALTH CARE EDUCATION/TRAINING PROGRAM

## 2023-02-28 PROCEDURE — 82805 BLOOD GASES W/O2 SATURATION: CPT | Performed by: SURGERY

## 2023-02-28 PROCEDURE — 84132 ASSAY OF SERUM POTASSIUM: CPT | Performed by: STUDENT IN AN ORGANIZED HEALTH CARE EDUCATION/TRAINING PROGRAM

## 2023-02-28 PROCEDURE — 85730 THROMBOPLASTIN TIME PARTIAL: CPT | Performed by: STUDENT IN AN ORGANIZED HEALTH CARE EDUCATION/TRAINING PROGRAM

## 2023-02-28 PROCEDURE — 258N000001 HC RX 258: Performed by: SURGERY

## 2023-02-28 PROCEDURE — 83036 HEMOGLOBIN GLYCOSYLATED A1C: CPT | Performed by: SURGERY

## 2023-02-28 PROCEDURE — 82803 BLOOD GASES ANY COMBINATION: CPT | Performed by: STUDENT IN AN ORGANIZED HEALTH CARE EDUCATION/TRAINING PROGRAM

## 2023-02-28 PROCEDURE — 85730 THROMBOPLASTIN TIME PARTIAL: CPT | Performed by: SURGERY

## 2023-02-28 PROCEDURE — 85027 COMPLETE CBC AUTOMATED: CPT | Performed by: STUDENT IN AN ORGANIZED HEALTH CARE EDUCATION/TRAINING PROGRAM

## 2023-02-28 PROCEDURE — 360N000078 HC SURGERY LEVEL 5, PER MIN: Performed by: SURGERY

## 2023-02-28 PROCEDURE — P9012 CRYOPRECIPITATE EACH UNIT: HCPCS | Performed by: STUDENT IN AN ORGANIZED HEALTH CARE EDUCATION/TRAINING PROGRAM

## 2023-02-28 PROCEDURE — 93975 VASCULAR STUDY: CPT | Mod: 26 | Performed by: RADIOLOGY

## 2023-02-28 PROCEDURE — 250N000013 HC RX MED GY IP 250 OP 250 PS 637: Performed by: STUDENT IN AN ORGANIZED HEALTH CARE EDUCATION/TRAINING PROGRAM

## 2023-02-28 PROCEDURE — 83605 ASSAY OF LACTIC ACID: CPT | Performed by: STUDENT IN AN ORGANIZED HEALTH CARE EDUCATION/TRAINING PROGRAM

## 2023-02-28 PROCEDURE — P9037 PLATE PHERES LEUKOREDU IRRAD: HCPCS

## 2023-02-28 PROCEDURE — 258N000003 HC RX IP 258 OP 636

## 2023-02-28 PROCEDURE — 47135 TRANSPLANTATION OF LIVER: CPT | Performed by: SURGERY

## 2023-02-28 PROCEDURE — 85049 AUTOMATED PLATELET COUNT: CPT | Performed by: STUDENT IN AN ORGANIZED HEALTH CARE EDUCATION/TRAINING PROGRAM

## 2023-02-28 PROCEDURE — 272N000085 HC PACK CELL SAVER CSP: Performed by: SURGERY

## 2023-02-28 PROCEDURE — 88313 SPECIAL STAINS GROUP 2: CPT | Mod: 26 | Performed by: STUDENT IN AN ORGANIZED HEALTH CARE EDUCATION/TRAINING PROGRAM

## 2023-02-28 PROCEDURE — 272N000001 HC OR GENERAL SUPPLY STERILE: Performed by: SURGERY

## 2023-02-28 PROCEDURE — 85730 THROMBOPLASTIN TIME PARTIAL: CPT | Performed by: INTERNAL MEDICINE

## 2023-02-28 PROCEDURE — 250N000025 HC SEVOFLURANE, PER MIN: Performed by: SURGERY

## 2023-02-28 PROCEDURE — 272N000086 HC PACK RI-RAPID INFUSION: Performed by: SURGERY

## 2023-02-28 PROCEDURE — 410N000004: Performed by: SURGERY

## 2023-02-28 PROCEDURE — 93975 VASCULAR STUDY: CPT

## 2023-02-28 PROCEDURE — 410N000003 HC PER-PERFUSION 1ST 30 MIN: Performed by: SURGERY

## 2023-02-28 RX ORDER — METHYLPREDNISOLONE SODIUM SUCCINATE 125 MG/2ML
100 INJECTION, POWDER, LYOPHILIZED, FOR SOLUTION INTRAMUSCULAR; INTRAVENOUS ONCE
Status: COMPLETED | OUTPATIENT
Start: 2023-03-02 | End: 2023-03-02

## 2023-02-28 RX ORDER — FENTANYL CITRATE 50 UG/ML
INJECTION, SOLUTION INTRAMUSCULAR; INTRAVENOUS PRN
Status: DISCONTINUED | OUTPATIENT
Start: 2023-02-28 | End: 2023-02-28

## 2023-02-28 RX ORDER — PROPOFOL 10 MG/ML
INJECTION, EMULSION INTRAVENOUS PRN
Status: DISCONTINUED | OUTPATIENT
Start: 2023-02-28 | End: 2023-02-28

## 2023-02-28 RX ORDER — FIBRINOGEN (HUMAN) 700-1300MG
1 KIT INTRAVENOUS
Status: COMPLETED | OUTPATIENT
Start: 2023-02-28 | End: 2023-02-28

## 2023-02-28 RX ORDER — PIPERACILLIN SODIUM, TAZOBACTAM SODIUM 3; .375 G/15ML; G/15ML
INJECTION, POWDER, LYOPHILIZED, FOR SOLUTION INTRAVENOUS PRN
Status: DISCONTINUED | OUTPATIENT
Start: 2023-02-28 | End: 2023-02-28

## 2023-02-28 RX ORDER — VALGANCICLOVIR HYDROCHLORIDE 50 MG/ML
450 POWDER, FOR SOLUTION ORAL DAILY
Status: DISCONTINUED | OUTPATIENT
Start: 2023-02-28 | End: 2023-03-03

## 2023-02-28 RX ORDER — DEXTROSE MONOHYDRATE 25 G/50ML
INJECTION, SOLUTION INTRAVENOUS PRN
Status: DISCONTINUED | OUTPATIENT
Start: 2023-02-28 | End: 2023-02-28

## 2023-02-28 RX ORDER — ONDANSETRON 2 MG/ML
4 INJECTION INTRAMUSCULAR; INTRAVENOUS EVERY 6 HOURS PRN
Status: DISCONTINUED | OUTPATIENT
Start: 2023-02-28 | End: 2023-02-28

## 2023-02-28 RX ORDER — BUMETANIDE 0.25 MG/ML
INJECTION INTRAMUSCULAR; INTRAVENOUS PRN
Status: DISCONTINUED | OUTPATIENT
Start: 2023-02-28 | End: 2023-02-28

## 2023-02-28 RX ORDER — NOREPINEPHRINE BITARTRATE 0.06 MG/ML
.01-.6 INJECTION, SOLUTION INTRAVENOUS CONTINUOUS
Status: DISCONTINUED | OUTPATIENT
Start: 2023-02-28 | End: 2023-03-02

## 2023-02-28 RX ORDER — PROPOFOL 10 MG/ML
5-75 INJECTION, EMULSION INTRAVENOUS CONTINUOUS
Status: DISCONTINUED | OUTPATIENT
Start: 2023-02-28 | End: 2023-03-02

## 2023-02-28 RX ORDER — NICOTINE POLACRILEX 4 MG
15-30 LOZENGE BUCCAL
Status: DISCONTINUED | OUTPATIENT
Start: 2023-02-28 | End: 2023-02-28

## 2023-02-28 RX ORDER — MYCOPHENOLATE MOFETIL 250 MG/1
750 CAPSULE ORAL
Status: DISCONTINUED | OUTPATIENT
Start: 2023-02-28 | End: 2023-03-06

## 2023-02-28 RX ORDER — MYCOPHENOLATE MOFETIL 200 MG/ML
750 POWDER, FOR SUSPENSION ORAL
Status: DISCONTINUED | OUTPATIENT
Start: 2023-02-28 | End: 2023-03-06

## 2023-02-28 RX ORDER — FIBRINOGEN (HUMAN) 700-1300MG
1 KIT INTRAVENOUS ONCE
Status: DISCONTINUED | OUTPATIENT
Start: 2023-02-28 | End: 2023-02-28

## 2023-02-28 RX ORDER — VALGANCICLOVIR 450 MG/1
450 TABLET, FILM COATED ORAL DAILY
Status: DISCONTINUED | OUTPATIENT
Start: 2023-02-28 | End: 2023-03-03

## 2023-02-28 RX ORDER — VASOPRESSIN IN 0.9 % NACL 2 UNIT/2ML
SYRINGE (ML) INTRAVENOUS PRN
Status: DISCONTINUED | OUTPATIENT
Start: 2023-02-28 | End: 2023-02-28

## 2023-02-28 RX ORDER — NITROGLYCERIN 10 MG/100ML
INJECTION INTRAVENOUS PRN
Status: DISCONTINUED | OUTPATIENT
Start: 2023-02-28 | End: 2023-02-28

## 2023-02-28 RX ORDER — CARDIOPLEG/ORGAN PRESERV NO.1 9-198-2-1
BOTTLE PERFUSION PRN
Status: DISCONTINUED | OUTPATIENT
Start: 2023-02-28 | End: 2023-02-28 | Stop reason: HOSPADM

## 2023-02-28 RX ORDER — PIPERACILLIN SODIUM, TAZOBACTAM SODIUM 3; .375 G/15ML; G/15ML
3.38 INJECTION, POWDER, LYOPHILIZED, FOR SOLUTION INTRAVENOUS EVERY 6 HOURS
Status: DISCONTINUED | OUTPATIENT
Start: 2023-02-28 | End: 2023-03-01

## 2023-02-28 RX ORDER — LIDOCAINE HYDROCHLORIDE 20 MG/ML
INJECTION, SOLUTION INFILTRATION; PERINEURAL PRN
Status: DISCONTINUED | OUTPATIENT
Start: 2023-02-28 | End: 2023-02-28

## 2023-02-28 RX ORDER — PREDNISONE 10 MG/1
10 TABLET ORAL ONCE
Status: COMPLETED | OUTPATIENT
Start: 2023-03-05 | End: 2023-03-05

## 2023-02-28 RX ORDER — DEXTROSE MONOHYDRATE 25 G/50ML
25-50 INJECTION, SOLUTION INTRAVENOUS
Status: DISCONTINUED | OUTPATIENT
Start: 2023-02-28 | End: 2023-02-28

## 2023-02-28 RX ORDER — CALCIUM CHLORIDE 100 MG/ML
INJECTION INTRAVENOUS; INTRAVENTRICULAR PRN
Status: DISCONTINUED | OUTPATIENT
Start: 2023-02-28 | End: 2023-02-28

## 2023-02-28 RX ORDER — SODIUM CHLORIDE, SODIUM LACTATE, POTASSIUM CHLORIDE, CALCIUM CHLORIDE 600; 310; 30; 20 MG/100ML; MG/100ML; MG/100ML; MG/100ML
INJECTION, SOLUTION INTRAVENOUS CONTINUOUS
Status: DISCONTINUED | OUTPATIENT
Start: 2023-02-28 | End: 2023-03-02

## 2023-02-28 RX ORDER — ONDANSETRON 4 MG/1
4 TABLET, ORALLY DISINTEGRATING ORAL EVERY 6 HOURS PRN
Status: DISCONTINUED | OUTPATIENT
Start: 2023-02-28 | End: 2023-02-28

## 2023-02-28 RX ORDER — DEXTROSE MONOHYDRATE 100 MG/ML
INJECTION, SOLUTION INTRAVENOUS CONTINUOUS PRN
Status: DISCONTINUED | OUTPATIENT
Start: 2023-02-28 | End: 2023-03-19 | Stop reason: HOSPADM

## 2023-02-28 RX ORDER — FLUCONAZOLE 2 MG/ML
400 INJECTION, SOLUTION INTRAVENOUS EVERY 24 HOURS
Status: DISCONTINUED | OUTPATIENT
Start: 2023-03-01 | End: 2023-03-01

## 2023-02-28 RX ORDER — METHYLPREDNISOLONE SODIUM SUCCINATE 125 MG/2ML
50 INJECTION, POWDER, LYOPHILIZED, FOR SOLUTION INTRAMUSCULAR; INTRAVENOUS ONCE
Status: COMPLETED | OUTPATIENT
Start: 2023-03-03 | End: 2023-03-03

## 2023-02-28 RX ORDER — TACROLIMUS 1 MG/1
2 CAPSULE ORAL
Status: DISCONTINUED | OUTPATIENT
Start: 2023-02-28 | End: 2023-03-04

## 2023-02-28 RX ORDER — SODIUM CHLORIDE, SODIUM GLUCONATE, SODIUM ACETATE, POTASSIUM CHLORIDE AND MAGNESIUM CHLORIDE 526; 502; 368; 37; 30 MG/100ML; MG/100ML; MG/100ML; MG/100ML; MG/100ML
INJECTION, SOLUTION INTRAVENOUS CONTINUOUS PRN
Status: DISCONTINUED | OUTPATIENT
Start: 2023-02-28 | End: 2023-02-28

## 2023-02-28 RX ADMIN — Medication 50 MCG/HR: at 06:47

## 2023-02-28 RX ADMIN — PIPERACILLIN AND TAZOBACTAM 3.38 G: 3; .375 INJECTION, POWDER, FOR SOLUTION INTRAVENOUS at 13:01

## 2023-02-28 RX ADMIN — Medication 1 UNITS: at 09:44

## 2023-02-28 RX ADMIN — Medication 20 MG: at 11:45

## 2023-02-28 RX ADMIN — Medication 20 MG: at 07:18

## 2023-02-28 RX ADMIN — EPINEPHRINE 10 MCG: 1 INJECTION INTRAMUSCULAR; INTRAVENOUS; SUBCUTANEOUS at 10:50

## 2023-02-28 RX ADMIN — Medication 90 MG: at 05:17

## 2023-02-28 RX ADMIN — CALCIUM CHLORIDE 1 G: 100 INJECTION INTRAVENOUS; INTRAVENTRICULAR at 12:13

## 2023-02-28 RX ADMIN — FLUOXETINE 60 MG: 20 CAPSULE ORAL at 22:19

## 2023-02-28 RX ADMIN — PROTHROMBIN, COAGULATION FACTOR VII HUMAN, COAGULATION FACTOR IX HUMAN, COAGULATION FACTOR X HUMAN, PROTEIN C, PROTEIN S HUMAN, AND WATER 1126 UNITS: KIT at 13:48

## 2023-02-28 RX ADMIN — Medication 20 MG: at 07:45

## 2023-02-28 RX ADMIN — SODIUM CHLORIDE, SODIUM GLUCONATE, SODIUM ACETATE, POTASSIUM CHLORIDE AND MAGNESIUM CHLORIDE: 526; 502; 368; 37; 30 INJECTION, SOLUTION INTRAVENOUS at 06:42

## 2023-02-28 RX ADMIN — DEXTROSE 50 % IN WATER (D50W) INTRAVENOUS SYRINGE 25 G: at 08:15

## 2023-02-28 RX ADMIN — Medication 50 MCG/HR: at 18:01

## 2023-02-28 RX ADMIN — MYCOPHENOLATE MOFETIL 750 MG: 200 POWDER, FOR SUSPENSION ORAL at 22:22

## 2023-02-28 RX ADMIN — Medication 0.09 MCG/KG/MIN: at 18:17

## 2023-02-28 RX ADMIN — FENTANYL CITRATE 100 MCG: 50 INJECTION, SOLUTION INTRAMUSCULAR; INTRAVENOUS at 05:17

## 2023-02-28 RX ADMIN — PANTOPRAZOLE SODIUM 40 MG: 40 INJECTION, POWDER, FOR SOLUTION INTRAVENOUS at 20:48

## 2023-02-28 RX ADMIN — PIPERACILLIN AND TAZOBACTAM 3.38 G: 3; .375 INJECTION, POWDER, FOR SOLUTION INTRAVENOUS at 06:19

## 2023-02-28 RX ADMIN — PROPOFOL 100 MG: 10 INJECTION, EMULSION INTRAVENOUS at 05:17

## 2023-02-28 RX ADMIN — FIBRINOGEN (HUMAN) 1 G: KIT INTRAVENOUS at 15:22

## 2023-02-28 RX ADMIN — HUMAN INSULIN 5 UNITS/HR: 100 INJECTION, SOLUTION SUBCUTANEOUS at 07:14

## 2023-02-28 RX ADMIN — SODIUM CHLORIDE, SODIUM GLUCONATE, SODIUM ACETATE, POTASSIUM CHLORIDE AND MAGNESIUM CHLORIDE: 526; 502; 368; 37; 30 INJECTION, SOLUTION INTRAVENOUS at 11:15

## 2023-02-28 RX ADMIN — PIPERACILLIN AND TAZOBACTAM 3.38 G: 3; .375 INJECTION, POWDER, FOR SOLUTION INTRAVENOUS at 11:01

## 2023-02-28 RX ADMIN — Medication 20 MG: at 09:03

## 2023-02-28 RX ADMIN — CALCIUM CHLORIDE 2 G: 100 INJECTION INTRAVENOUS; INTRAVENTRICULAR at 11:25

## 2023-02-28 RX ADMIN — Medication 1 UNITS: at 09:51

## 2023-02-28 RX ADMIN — HUMAN INSULIN 1.5 UNITS/HR: 100 INJECTION, SOLUTION SUBCUTANEOUS at 18:00

## 2023-02-28 RX ADMIN — FENTANYL CITRATE 50 MCG: 50 INJECTION, SOLUTION INTRAMUSCULAR; INTRAVENOUS at 06:48

## 2023-02-28 RX ADMIN — Medication 20 MG: at 13:10

## 2023-02-28 RX ADMIN — NITROGLYCERIN 200 MCG: 10 INJECTION INTRAVENOUS at 12:15

## 2023-02-28 RX ADMIN — NOREPINEPHRINE BITARTRATE 12.8 MCG: 1 INJECTION, SOLUTION, CONCENTRATE INTRAVENOUS at 10:34

## 2023-02-28 RX ADMIN — PIPERACILLIN AND TAZOBACTAM 3.38 G: 3; .375 INJECTION, POWDER, LYOPHILIZED, FOR SOLUTION INTRAVENOUS at 20:49

## 2023-02-28 RX ADMIN — SODIUM CHLORIDE, POTASSIUM CHLORIDE, SODIUM LACTATE AND CALCIUM CHLORIDE 1000 ML: 600; 310; 30; 20 INJECTION, SOLUTION INTRAVENOUS at 22:08

## 2023-02-28 RX ADMIN — SODIUM CHLORIDE, POTASSIUM CHLORIDE, SODIUM LACTATE AND CALCIUM CHLORIDE: 600; 310; 30; 20 INJECTION, SOLUTION INTRAVENOUS at 17:39

## 2023-02-28 RX ADMIN — Medication 0.03 MCG/KG/MIN: at 10:14

## 2023-02-28 RX ADMIN — EPINEPHRINE 20 MCG: 1 INJECTION INTRAMUSCULAR; INTRAVENOUS; SUBCUTANEOUS at 10:58

## 2023-02-28 RX ADMIN — SODIUM CHLORIDE, POTASSIUM CHLORIDE, SODIUM LACTATE AND CALCIUM CHLORIDE 1000 ML: 600; 310; 30; 20 INJECTION, SOLUTION INTRAVENOUS at 23:40

## 2023-02-28 RX ADMIN — Medication 1 UNITS/HR: at 06:53

## 2023-02-28 RX ADMIN — Medication 3 UNITS: at 12:18

## 2023-02-28 RX ADMIN — NOREPINEPHRINE BITARTRATE 6.4 MCG: 1 INJECTION, SOLUTION, CONCENTRATE INTRAVENOUS at 12:54

## 2023-02-28 RX ADMIN — PIPERACILLIN AND TAZOBACTAM 3.38 G: 3; .375 INJECTION, POWDER, FOR SOLUTION INTRAVENOUS at 14:39

## 2023-02-28 RX ADMIN — NOREPINEPHRINE BITARTRATE 6.4 MCG: 1 INJECTION, SOLUTION, CONCENTRATE INTRAVENOUS at 09:43

## 2023-02-28 RX ADMIN — SODIUM CHLORIDE 1000 MG: 9 INJECTION, SOLUTION INTRAVENOUS at 13:16

## 2023-02-28 RX ADMIN — SODIUM CHLORIDE, SODIUM GLUCONATE, SODIUM ACETATE, POTASSIUM CHLORIDE AND MAGNESIUM CHLORIDE: 526; 502; 368; 37; 30 INJECTION, SOLUTION INTRAVENOUS at 12:47

## 2023-02-28 RX ADMIN — SODIUM CHLORIDE, SODIUM GLUCONATE, SODIUM ACETATE, POTASSIUM CHLORIDE AND MAGNESIUM CHLORIDE: 526; 502; 368; 37; 30 INJECTION, SOLUTION INTRAVENOUS at 09:53

## 2023-02-28 RX ADMIN — PROPOFOL 50 MCG/KG/MIN: 10 INJECTION, EMULSION INTRAVENOUS at 16:41

## 2023-02-28 RX ADMIN — NOREPINEPHRINE BITARTRATE 12.8 MCG: 1 INJECTION, SOLUTION, CONCENTRATE INTRAVENOUS at 10:37

## 2023-02-28 RX ADMIN — EPINEPHRINE 0.03 MCG/KG/MIN: 1 INJECTION INTRAMUSCULAR; INTRAVENOUS; SUBCUTANEOUS at 08:12

## 2023-02-28 RX ADMIN — NOREPINEPHRINE BITARTRATE 12.8 MCG: 1 INJECTION, SOLUTION, CONCENTRATE INTRAVENOUS at 10:31

## 2023-02-28 RX ADMIN — CALCIUM CHLORIDE 0.5 G: 100 INJECTION INTRAVENOUS; INTRAVENTRICULAR at 15:54

## 2023-02-28 RX ADMIN — Medication 10 MG: at 06:50

## 2023-02-28 RX ADMIN — RIFAXIMIN 550 MG: 550 TABLET ORAL at 22:22

## 2023-02-28 RX ADMIN — FIBRINOGEN (HUMAN) 1 G: KIT INTRAVENOUS at 14:13

## 2023-02-28 RX ADMIN — Medication 10 MG: at 10:23

## 2023-02-28 RX ADMIN — DEXTROSE 50 % IN WATER (D50W) INTRAVENOUS SYRINGE 25 G: at 07:14

## 2023-02-28 RX ADMIN — CALCIUM CHLORIDE 0.5 G: 100 INJECTION INTRAVENOUS; INTRAVENTRICULAR at 15:06

## 2023-02-28 RX ADMIN — PROPOFOL 30 MCG/KG/MIN: 10 INJECTION, EMULSION INTRAVENOUS at 20:58

## 2023-02-28 RX ADMIN — CALCIUM CHLORIDE 1 G/HR: 100 INJECTION, SOLUTION INTRAVENOUS at 06:38

## 2023-02-28 RX ADMIN — PROTHROMBIN, COAGULATION FACTOR VII HUMAN, COAGULATION FACTOR IX HUMAN, COAGULATION FACTOR X HUMAN, PROTEIN C, PROTEIN S HUMAN, AND WATER 1126 UNITS: KIT at 13:29

## 2023-02-28 RX ADMIN — DEXTROSE MONOHYDRATE 100 ML/HR: 100 INJECTION, SOLUTION INTRAVENOUS at 11:07

## 2023-02-28 RX ADMIN — DEXTROSE 50 % IN WATER (D50W) INTRAVENOUS SYRINGE 25 G: at 11:30

## 2023-02-28 RX ADMIN — BUMETANIDE 0.5 MG: 0.25 INJECTION, SOLUTION INTRAMUSCULAR; INTRAVENOUS at 11:40

## 2023-02-28 RX ADMIN — NOREPINEPHRINE BITARTRATE 12.8 MCG: 1 INJECTION, SOLUTION, CONCENTRATE INTRAVENOUS at 10:22

## 2023-02-28 RX ADMIN — MIDAZOLAM 1 MG: 1 INJECTION INTRAMUSCULAR; INTRAVENOUS at 05:07

## 2023-02-28 RX ADMIN — EPINEPHRINE 30 MCG: 1 INJECTION INTRAMUSCULAR; INTRAVENOUS; SUBCUTANEOUS at 10:54

## 2023-02-28 RX ADMIN — FLUCONAZOLE IN SODIUM CHLORIDE 400 MG: 2 INJECTION, SOLUTION INTRAVENOUS at 06:09

## 2023-02-28 RX ADMIN — ALBUMIN (HUMAN): 12.5 SOLUTION INTRAVENOUS at 12:13

## 2023-02-28 RX ADMIN — LIDOCAINE HYDROCHLORIDE 80 MG: 20 INJECTION, SOLUTION INFILTRATION; PERINEURAL at 05:17

## 2023-02-28 RX ADMIN — PIPERACILLIN AND TAZOBACTAM 3.38 G: 3; .375 INJECTION, POWDER, FOR SOLUTION INTRAVENOUS at 08:59

## 2023-02-28 RX ADMIN — VALGANCICLOVIR 450 MG: 450 TABLET, FILM COATED ORAL at 22:23

## 2023-02-28 RX ADMIN — TACROLIMUS 2 MG: 5 CAPSULE ORAL at 22:23

## 2023-02-28 RX ADMIN — EPINEPHRINE 10 MCG: 1 INJECTION INTRAMUSCULAR; INTRAVENOUS; SUBCUTANEOUS at 10:37

## 2023-02-28 RX ADMIN — SODIUM CHLORIDE, SODIUM GLUCONATE, SODIUM ACETATE, POTASSIUM CHLORIDE AND MAGNESIUM CHLORIDE: 526; 502; 368; 37; 30 INJECTION, SOLUTION INTRAVENOUS at 06:37

## 2023-02-28 RX ADMIN — Medication 20 MG: at 10:41

## 2023-02-28 RX ADMIN — FIBRINOGEN (HUMAN) 1 G: KIT INTRAVENOUS at 15:14

## 2023-02-28 RX ADMIN — SODIUM CHLORIDE, SODIUM GLUCONATE, SODIUM ACETATE, POTASSIUM CHLORIDE AND MAGNESIUM CHLORIDE: 526; 502; 368; 37; 30 INJECTION, SOLUTION INTRAVENOUS at 05:03

## 2023-02-28 RX ADMIN — Medication 10 MG: at 14:21

## 2023-02-28 RX ADMIN — DEXTROSE AND SODIUM CHLORIDE: 5; 450 INJECTION, SOLUTION INTRAVENOUS at 17:14

## 2023-02-28 RX ADMIN — KETAMINE HYDROCHLORIDE 10 MG/HR: 100 INJECTION, SOLUTION, CONCENTRATE INTRAMUSCULAR; INTRAVENOUS at 06:33

## 2023-02-28 RX ADMIN — Medication 0.5 UNITS: at 07:24

## 2023-02-28 ASSESSMENT — ACTIVITIES OF DAILY LIVING (ADL)
ADLS_ACUITY_SCORE: 33
ADLS_ACUITY_SCORE: 31
ADLS_ACUITY_SCORE: 33
ADLS_ACUITY_SCORE: 31
ADLS_ACUITY_SCORE: 33

## 2023-02-28 NOTE — ANESTHESIA CARE TRANSFER NOTE
Patient: Dillon Salter    Procedure: Procedure(s):  Transplant liver recipient  donor       Diagnosis: End stage liver disease (H) [K72.10]  Diagnosis Additional Information: No value filed.    Anesthesia Type:   General     Note:    Oropharynx: ventilatory support and endotracheal tube in place  Level of Consciousness: iatrogenic sedation    Level of Supplemental Oxygen (L/min / FiO2): 10  Independent Airway: airway patency not satisfactory and stable  Dentition: dentition unchanged  Vital Signs Stable: post-procedure vital signs reviewed and stable  Report to RN Given: handoff report given  Patient transferred to: ICU  Comments: Patient to ICU ETT, O2, Hand ventilation, ASA Standard monitors, sedation, placed on vent on arrival to room, RN at bedside.  ICU Handoff: Call for PAUSE to initiate/utilize ICU HANDOFF, Identified Patient, Identified Responsible Provider, Reviewed the Pertinent Medical History, Discussed Surgical Course, Reviewed Intra-OP Anesthesia Management and Issues during Anesthesia, Set Expectations for Post Procedure Period and Allowed Opportunity for Questions and Acknowledgement of Understanding      Vitals:  Vitals Value Taken Time   /75 23 1649   Temp     Pulse 69 23 1658   Resp 12    SpO2 100 % 23 1658   Vitals shown include unvalidated device data.    Electronically Signed By: VAMSHI Issa CRNA  2023  4:59 PM

## 2023-02-28 NOTE — ANESTHESIA PROCEDURE NOTES
Central Line/PA Catheter Placement    Pre-Procedure   Staff -        Anesthesiologist:  Lloyd Bronson MD       Resident/Fellow: Edvin Tinoco MD       Performed By: resident and with residents       Procedure performed by resident/fellow/CRNA in presence of a teaching physician.         Location: OR       Pre-Anesthestic Checklist: patient identified, IV checked, site marked, risks and benefits discussed, informed consent, monitors and equipment checked, pre-op evaluation and at physician/surgeon's request  Timeout:       Correct Patient: Yes        Correct Procedure: Yes        Correct Site: Yes        Correct Position: Yes        Correct Laterality: Yes   Line Placement:   This line was placed Post Induction    Procedure   Procedure: central line       Laterality: right       Insertion Site: internal jugular.       Patient Position: Trendelenburg and supine  Sterile Prep        All elements of maximal sterile barrier technique followed       Patient Prep/Sterile Barriers: draped, hand hygiene, gloves , hat , mask , draped, gown, sterile gel and probe cover       Skin prep: Chloraprep  Insertion/Injection        Technique: ultrasound guided and Seldinger Technique        1. Ultrasound was used to evaluate the access site.       2. Vein evaluated via ultrasound for patency/adequacy.       3. Using real-time ultrasound the needle/catheter was observed entering the artery/vein.       Introducer Type: 9 Fr, 2-lumen MAC        Type: PA/CVC with Introducer       Catheter Size: 7.5 Fr       Catheter Length: 15       Number of Lumens: double lumen  Narrative         Secured by: suture       Tegaderm and Biopatch dressing used.       Complications: None apparent,        blood aspirated from all lumens,        All lumens flushed: Yes       Verification method: Ultrasound and Placement to be verified post-op

## 2023-02-28 NOTE — TELEPHONE ENCOUNTER
Organ Offer Encounter Information    Organ Offer Information  Organ offer date & time: 2/27/2023 10:12 AM  Coordinator/Fellow/Attending name: Rola Gutierrez RN   Organ(s):  Organ UNOS ID Match Run ID Comment Organ Laterality   Liver [id number]       Discussed risk category with Patient/Other: N/A  Organ disposition: Transplanted  Additional Comments: 2/27/2023 10:13 AM  Liver: Primary, local  MD: Dr. Sterling  OPO Contact: Tea - [phone]  Donor/Recip HCV Status: Negative/Negative  (HCV+ Donors - Discuss HCV genotyping/quant testing with MD & send Epic in basket message to [facility] HCV POOL - Include Donor UNOS ID)  Donor Nutritional Status: D5 @ 35  Donor Meets Risk Criteria for HIV/HCV/HBV: No  Plan: Patient currently hospitalized.  MD to speak to patient and/or family.  Dr. Broussard & Dr. Sterling to talk to patient and family today.     Admissions: Already admitted  Unit: 6B  Update Provider Entering Orders (XM Plan & COVID Testing):  1022 - Caren ENAMORADO, putting in COVID & pre-op orders  Immunology: 1033 - Deysi  Inpatient Lab (COVID Testing [phone], Option 2): Done on 2/24  Book OR: 1035 - Booked for 2/28 @ 0400 with Noa  Vessel Storage Confirmation (PA/REGINALD/FABIENNE): Ok to bank  Blood Bank: 1042 - [facility]  Research: ON HOLD  TransNet/ABO Verification: Printed @ 1030  Add Organ: Done @ 1023    Donor OR Time: 0100 2/28  Procuring MD: Dr. Valdez  Contact in the OR: TBD  Organs Being Procured: Liver/Kidneys, thoracic for research  Flush Solution: UW  Biopsy: Bedside sufficient unless surgeon requests otherwise, someone available at [facility] to prepare slides if necessary intra-op  Pump: N/A  Special Requests (Special blood tubes, nodes, waivers): No  MD for Visualization: Dr. Sterling  Transportation Details: Pickup for Cy @ 1230 on 2/28,  will wait to bring Cy to the [facility]    Attestation I have discussed all of the above with the Patient/Legal Guardian/Caregiver  regarding this organ offer.: Yes  Coordinator/Fellow/Attending name: Rola Gutierrez, RN

## 2023-02-28 NOTE — ANESTHESIA PROCEDURE NOTES
Central Line/PA Catheter Placement    Pre-Procedure   Staff -        Anesthesiologist:  Lloyd Bronson MD       Resident/Fellow: Edvin Tinoco MD       Performed By: resident and with residents       Procedure performed by resident/fellow/CRNA in presence of a teaching physician.         Location: OR       Pre-Anesthestic Checklist: patient identified, IV checked, site marked, risks and benefits discussed, informed consent, monitors and equipment checked, pre-op evaluation and at physician/surgeon's request  Timeout:       Correct Patient: Yes        Correct Procedure: Yes        Correct Site: Yes        Correct Position: Yes        Correct Laterality: Yes   Line Placement:   This line was placed Post Induction    Procedure   Procedure: central line       Laterality: right       Insertion Site: internal jugular.       Patient Position: Trendelenburg and supine  Sterile Prep        All elements of maximal sterile barrier technique followed       Patient Prep/Sterile Barriers: draped, hand hygiene, gloves , hat , mask , draped, gown, sterile gel and probe cover       Skin prep: Chloraprep  Insertion/Injection        Technique: ultrasound guided and Seldinger Technique        1. Ultrasound was used to evaluate the access site.       2. Vein evaluated via ultrasound for patency/adequacy.       3. Using real-time ultrasound the needle/catheter was observed entering the artery/vein.       Introducer Type: 9 Fr, 2-lumen MAC        Type: PA/CVC with Introducer       Catheter Size: 9 Fr       Catheter Length: 15       Number of Lumens: double lumen       PA Catheter Type: CCO         Appropriate RV, RA and PA waveforms noted:  Yes            Withdrawn and Locked at cm: 50            Balloon down at end of the procedure:   Narrative         Secured by: suture       Tegaderm and Biopatch dressing used.       Complications: None apparent,        blood aspirated from all lumens,        All  lumens flushed: Yes       Verification method: Ultrasound and Placement to be verified post-op

## 2023-02-28 NOTE — PLAN OF CARE
Problem: Plan of Care - These are the overarching goals to be used throughout the patient stay.    Goal: Plan of Care Review  Description: The Plan of Care Review/Shift note should be completed every shift.  The Outcome Evaluation is a brief statement about your assessment that the patient is improving, declining, or no change.  This information will be displayed automatically on your shift note.  Outcome: Not Progressing  Flowsheets (Taken 2/27/2023 1855)  Outcome Evaluation: pt planning to have procedure early morning tomorrow, oxy given once for mild pain, pt shower this evening  Plan of Care Reviewed With:   patient   spouse   family   NURSING PROGRESS NOTE  Shift Summary      Date: February 27, 2023     Neuro/Musculoskeletal:  A&Ox4.   Cardiac:  SR.  VSS.     Respiratory:  Sating in the 90s on RA.  GI/:  Adequate urine output.  LBM: today, loose  Diet/Appetite:  Tolerating reg diet. NPO at midnight  Activity:  SBA   Pain:  2/10 this afternoon, oxy given once, pt now denies pain  Skin:  No new deficits noted.   LDAs + Drips/IVF:  PIV X2 SL  Protocols/Labs:  hgb-7.5    Pertinent Shift Updates:  pt planning to have procedure early morning tomorrow, oxy given once for mild pain, pt shower this evening      Plan:  continue plan of care      Calixto Epps RN  .................................................... February 27, 2023   6:57 PM  [facility] ([facility]): [facility] ICU (Unit 6D)

## 2023-02-28 NOTE — BRIEF OP NOTE
[facility] Brief Operative Note    Pre-operative diagnosis: End stage liver disease (H) [K72.10]   Post-operative diagnosis End stage liver disease   Procedure: Procedure(s):  Transplant liver recipient  donor   Surgeon(s): Surgeon(s) and Role:     * Thelma Sterling MD - Primary     * Romeo Acevedo MD - Assisting     * Morales Wang MD - Resident - Assisting     * Anderson Broussard MD - Fellow - Assisting     * Jerson Valdez MD - Fellow - Assisting     * Joshua Cox MD - Fellow - Assisting   Estimated blood loss: * No values recorded between 2023  6:52 AM and 2023  4:30 PM *    Specimens: ID Type Source Tests Collected by Time Destination   1 : Liver and Gallbladder Tissue Liver SURGICAL PATHOLOGY EXAM Thelma Sterling MD 2023 12:37 PM    2 : Gallbladder (Donor) Tissue Gallbladder SURGICAL PATHOLOGY EXAM Thelma Sterling MD 2023  1:16 PM       Findings: none

## 2023-02-28 NOTE — OP NOTE
Transplant Center   Operative Note     Procedure date:  02/28/23    Preoperative diagnosis:  End Stage Liver Disease due to Laennec's    Postoperative diagnosis:  Same    Procedure:  1. 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 End-to-end Choledochocholedochostomy   3. Venovenous bypass    Surgeon:  Surgeon(s) and Role:     * Thelma Sterling MD - Primary     * Romeo Acevedo MD - Assisting     * Morales Wang MD - Resident - Assisting     * Anderson Broussard MD - Fellow - Assisting     * Jerson Valdez MD - Fellow - Assisting     * Joshua Cox MD - Fellow - Assisting    Fellow/assistant:   Joshua Cox, fellow There was no qualified resident to assist with this procedure    Anesthesia:  General    Specimen:  donor gallbladder, recipient liver    Drains:  2xJP    Urine output:  885 mls    Estimated blood loss:  20L    Fluids administered:       Intraoperative Events: cirrhotic liver with significant collaterals resulting in significant blood loss    Complications: None    Findings: portal end to end anastomosis, duct to duct anastomosis recipient 2x larger, caval replacement     Indication: Dillon Salter with a history of End Stage Liver Disease due to Laennec's who presents for Donation after Brain Death Whole Liver transplant. A suitable donor offer has become available. After discussing the risks and benefits of proceeding, the patient agreed to proceed with surgery and provided informed consent.    Final ABO/Crossmatch verification: After the donor organ arrived to the operating room and prior to anastomosis, I participated in the transplant pre-verification upon organ receipt timeout by visually verifying the donor ID, organ and laterality, donor blood type, recipient unique identifier, recipient blood type, and that the donor and recipient are blood type compatible.     Donor UNOS ID:  [id number]    Donor ABO:  A1    Donor arterial clamp on:  2/28/2023  3:23 AM     Preservation fluid:  UW     Vessels with organ:  Yes    Donor organ arrival to recipient room:  2/28/2023  5:45 AM    Total ischemic time:  530    Cold ischemic time:  538    Warm ischemic time:      Ex-vivo:  No    Time placed on Ex-vivo perfusion:  Yes    Total time on Ex-vivo perfusion:   min     Back Table Preparation:   Procedure:  Bench preparation of the liver allograft for transplantation    Preoperative diagnosis:  End Stage Liver Disease due to Laennec's    Postoperative diagnosis:  Same,    Surgeon:  Surgeon(s) and Role:     * Thelma Sterling MD - Primary     * Romeo Acevedo MD - Assisting     * Morales Wang MD - Resident - Assisting     * Anderson Broussard MD - Fellow - Assisting     * Jerson Valdez MD - Fellow - Assisting     * Joshua Cox MD - Fellow - Assisting    Co-Surgeon:  Thelma Sterling M.D.    Fellow/Assistant:  Cy De La Cruz, fellow, There was no qualified resident to assist with this procedure    Anesthesia:  None    Graft biopsy:  No    Macroscopic steatosis:      Back table reconstruction:  No Reconstruction    Intimal flap repair:  no    # of hepatic arteries:  2    # of portal veins:  1    Accessory arteries:  0    # of hepatic veins:  3    # of bile ducts:  1    Graft weight:       Findings:   Liver Laceration: No  Overall quality of liver: Good    Back Table Procedure: The liver allograft was received and inspected and the aforementioned findings were noted. It was flushed with UW. The donor liver was placed in fresh ice-cold preservation solution. We identified the inferior vena cava. Two stay sutures were placed on the supra-hepatic portion of the cava. Two stay sutures were placed on the infra-hepatic portion of the cava. The fibro-fatty tissue and adrenal gland was cleared of inferior vena cava. The phrenic vein was ligated. The adrenal vein was ligated. The IVC was tested for leaks by using a bulb syringe. We suture ligated all  identified leaks. The portal vein was identified. All the fibro-fatty area or tissue around the portal vein was removed and the portal vein was dissected up to its bifurcation. An 8-Chinese cannula was placed in the portal vein and fixed with a stitch. The portal vein was tested for leaks. We suture ligated all identified leaks. The cannula was left in place to be used for flushing the liver at the time of implantation. The hepatic artery anatomy was identified. The celiac axis  was traced all the way from the aortic patch to the level of the gastro-duodenal artery. Dissection was stopped at the level of the gastro-duodenal artery. All the leaks in the hepatic artery tributaries were suture ligated. The bile duct was inspected and flushed. No reconstruction was required. The liver was placed back in ice-cold preservation solution until ready for transplantation. Faculty was present for the critical portions of the procedure.    Findings: Common hepatic directly off of aorta separate from left gastric and splenic   Operative Procedure:   Arterial anastomosis start:  2/28/2023 12:21 PM    Recipient arterial unclamp:  2/28/2023 12:42 PM    Extra vessels used:  no    Extra vessels banked:  Yes    Previous upper abd surgery:  No    Previous cholecystectomy?  No    Portal vein:  Thrombus: No   Patent: Yes-     Specify:     On portal bypass:  Yes-      Arterial flow:  Sufficient: Yes-      Bile duct anastomosis:  To bowel: No   Specify:    To duct: Yes-     Specify:      Dillon Salter was brought to the operating room, placed in a supine position, and a time out was performed. Sequential compression devices were placed on both lower extremities and general endotracheal anesthesia was induced. The patient was given IV antibiotics, and Solumedrol. A Negron catheter was placed. A central line was placed by Anesthesia service. The abdomen was then shaved, prepped, and draped in the usual sterile fashion.  The backtable  preparation occurred prior to implantation.    We entered the abdominal cavity using Vertical Extension (Julia) incision. The abdomen was examined.  We proceeded to mobilization of the liver first by dividing falciform ligament, next dividing the triangular ligaments and mobilizing the left lobe with further evaluation of the right lobe of the liver. Next the right lobe of the liver was mobilized. We elected to proceed with a hilar dissection. The right and left hepatic arteries were identified, ligated and divided, followed by ligation and division of the common bile duct. The portal vein was cleared from tissue and small branches were tied off and divided. The left and right portal veins were separately ligated and the portal vein was divided. At this point we went on the bypass with bypass flow of 1 liter per minute. Next, we continued mobilizing the right lobe. The adhesions between the right lobe and the right diaphragm were divided and the lobe was mobilized up to the vena cava. The hepatic veins were identified. Next, we dissected out the caudate lobe and infrahepatic IVC.     We applied Infrahepatic and suprahepatic clamps to the IVC and the liver was excised with curved Harley scissors. The recipient's liver was passed off from the operating table. Next, complete hemostasis was obtained. The donor liver was brought into the field. The suprahepatic IVC anastomosis was constructed with 3-0 Prolene followed by the construction of infrahepatic anastomosis with 4-0 Prolene. Next, we came off the bypass and end-to-end portal anastomosis was constructed between the donor and recipient portal veins using 6-0 Prolene. During the construction of the infrahepatic IVC anastomosis, the liver was flushed with 600cc of blood. Once the portal anastomosis was constructed, the liver was reperfused. Arterial anastomosis was performed between the donor celiac trunk patch using Kelly technique and the bifurcation of the right  and left recipient hepatic arteries. After clamps were released, there was a good flow within the donor artery. Again, all the arterial branches that were bleeding were ligated and complete hemostasis was obtained. After the anastomosis was performed, good flow was re-established, hemostasis was obtained. Next, the biliary anastomosis was performed using a 6-0 running PDS suture over the stent. The recipient duct was 2x larger and closed partially with 6-0 prolene suture.     Next again the abdomen was irrigated and hemostasis was obtained. The patient was noted to be significantly coagulopathic so anesthesia was allowed time to correct coagulopathy and hemostatic agents were applied. After sufficient hemostasis we proceeded with closure. We closed the abdomen with #1 PDS nonlooped suture in layers. The skin was closed with staples. All needle, sponge and instrument counts were correct x 2. Faculty was present for key portions of the procedure.     I was present during the key portions of the procedure, and I was immediately available for the entire procedure. There was no qualified resident available to assist with the entire procedure. The fellow noted above, Dr Broussard participated as the first assistant in all parts of the dictated procedure and Dr Kelly was the primary in the opening and closure with assistance from me as needed. Dr Acevedo performed the biliary anastomosis

## 2023-02-28 NOTE — ANESTHESIA PROCEDURE NOTES
Airway       Patient location during procedure: OR  Staff -        Anesthesiologist:  Lloyd Bronson MD       Resident/Fellow: Edvin Tinoco MD       Performed By: resident  Consent for Airway        Urgency: elective  Indications and Patient Condition       Indications for airway management: jalen-procedural       Induction type:intravenous       Mask difficulty assessment: 1 - vent by mask    Final Airway Details       Final airway type: endotracheal airway       Successful airway: ETT - single and Oral  Endotracheal Airway Details        ETT size (mm): 7.5       Cuffed: yes       Successful intubation technique: direct laryngoscopy       DL Blade Type: MAC 4       Grade View of Cords: 1       Adjucts: stylet       Position: Right       Measured from: lips       Secured at (cm): 23       Bite block used: None    Post intubation assessment        Placement verified by: capnometry, equal breath sounds and chest rise        Number of attempts at approach: 1       Number of other approaches attempted: 0       Secured with: pink tape       Ease of procedure: easy       Dentition: Intact and Unchanged

## 2023-02-28 NOTE — ANESTHESIA PROCEDURE NOTES
Arterial Line Procedure Note    Pre-Procedure   Staff -        Anesthesiologist:  Lloyd Bronson MD       Resident/Fellow: Edvin Tinoco MD       Performed By: resident       Location: OR       Pre-Anesthestic Checklist: patient identified, IV checked, risks and benefits discussed, informed consent, monitors and equipment checked, pre-op evaluation and at physician/surgeon's request  Timeout:       Correct Patient: Yes        Correct Procedure: Yes        Correct Site: Yes        Correct Position: Yes   Line Placement:   This line was placed Post Induction  Procedure   Procedure: arterial line       Diagnosis: hemodynamic monitoring       Laterality: left       Insertion Site: radial.  Sterile Prep        Standard elements of sterile barrier followed       Skin prep: Chloraprep  Insertion/Injection        Technique: ultrasound guided and Seldinger Technique        1. Ultrasound was used to evaluate the access site.       2. Artery evaluated via ultrasound for patency/adequacy.       3. Using real-time ultrasound the needle/catheter was observed entering the artery/vein.       Catheter Type/Size: 20 G, 12 cm  Narrative        Tegaderm dressing used.       Complications: None apparent,        Arterial waveform: Yes        IBP within 10% of NIBP: Yes

## 2023-02-28 NOTE — PROGRESS NOTES
Patient removed from OS waitlist after  donor liver  transplant. OS ID [id number].    Donor Has Risk Criteria for Transmission of HIV/HCV/HBV: no  Recipient Notified of Risk Criteria: no

## 2023-02-28 NOTE — H&P
SURGICAL ICU ADMISSION NOTE  2/28/2023    PRIMARY TEAM: Liver transplant   PRIMARY PHYSICIAN: Dr Perdomo    REASON FOR CRITICAL CARE ADMISSION: Post operative monitoring   ADMITTING PHYSICIAN: Leanne    ASSESSMENT:   Dillon Salter is a 27 year old male with Asperger's, T2DM, alcohol related cirrhosis c/b hepatic encephalopathy, history of esophageal varices bleeding (5/2022, 1/2023) and rectal varices s/p sclerotherapy 1/2023, ascites presenting with hematemesis. Underwent EGD 2/11 showed oozing portal hypertensive gastropathy. He is now s/p DDLT. He is admitted to the SICU for post-op hemodynamic and respiratory monitoring.  Was initially on nor epi as well as epi, has since been weaned off of both, low urinary output intraoperatively with concern for possible ongoing oozing, transplant aware    EBL 20 L  7 L Cell Saver  14 units packed red blood cells  15 units FFP  3 platelets  2 Kcentra  3 fibrinogen  7 L Plasma-Lyte      PLAN:   Neuro/ pain/ sedation:  -Monitor neurological status. Notify the MD for any acute changes in exam.  -Fentanyl gtt for pain.  -Propofol for sedation.     Pulmonary care:   #Hypoxia 2/2 to fluid overload  - Intubated   - Supplemental oxygen to keep saturation above 92 %.  - Incentive spirometer every 15- 30 minutes when awake.       Cardiovascular:    - Monitor hemodynamic status. MAP goal >65   - Off all     GI care:   #ESLD 2/2 alcoholic cirrhosis  #Hx of esophageal varices   #Portal hypertensive gastropathy  #Esophageal ulceration  #Recurrent rectal varices  #Hx of hepatic encephalopathy   MELD-Na score: 26 at 2/27/2023  4:33 AM  MELD score: 23 at 2/27/2023  4:33 AM  Calculated from:  Serum Creatinine: 1.22 mg/dL at 2/27/2023  4:33 AM  Serum Sodium: 132 mmol/L at 2/27/2023  4:33 AM  Total Bilirubin: 8.5 mg/dL at 2/27/2023  4:33 AM  INR(ratio): 1.76 at 2/27/2023  4:33 AM  Age: 27 years  - Liver US: pending   - Monitor LFTs  - Lactulose   - Rifaximin   - IV Protonix   - Coreg 6.25mg  BID   - NPO except ice chips and medications.  -Hold PTA meds on bumex 1 mg daily and spironolactone 100 mg daily  - GI and transplant following       Fluids/ Electrolytes/ Nutrition:   -LR for IV fluid hydration  -ICU electrolyte replacement protocol  -No indication for parenteral nutrition.  -Nutrition consulted. Appreciate recs     Renal/ Fluid Balance:    #Hx of ASHISH   Baseline Cr <1 (Cr:1.43)  - BMP q6 hours  -Will continue to monitor intake and output.       Endocrine:    #Stress and steroid Induced hyperglycemia   #Type II Diabetes   -Hgb A1c  5.6%  -Insulin drip     ID/ Antibiotics:  Afebrile. WBC: 7.6    #Day op Abs  - Zoyn x 48 hrs     #Tx prophylaxis   - Bactrim, Valcyte, Maritza    #Tx immunosuppression   - MMF, Tac, Pred taper     Heme:     #Acute on chronic blood loss anemia- stable  #Acute blood loss anemia  -Hemoglobin 6.8.   - Transfuse if hgb <7 or sign/symptoms of hypoperfusion  -Transfusion goals: Hemoglobin greater than 7, fibrinogen greater than 200, INR less than 2, platelets greater than 20     Prophylaxis:    -Mechanical prophylaxis for DVT.   -No chemical DVT prophylaxis due to high risk of bleeding.  - PPIs     MSK:    #Chronic Pain   - PTA oxycodone   - PTA Tylenol   - PT and OT consulted. Appreciate recs.       Lines/ tubes/ drains:  -Negron  ET tube  Left radial art line  2 right IJ MAC lines  Peripheral IV     Disposition:  -Surgical ICU.     Patient seen, findings and plan discussed with surgical ICU staff.    Prepared by Dillon Morse, PGY2      - - - - - - - - - - - - - - - - - - - - - - - - - - - - - - - - - - - - - - - - - - - - - - - - - - - - - - - - - - - - - - - - - - - - - - - -     HISTORY PRESENTING ILLNESS:  Dillon Salter is a 27 year old male with Asperger's, T2DM, alcohol related cirrhosis c/b hepatic encephalopathy, history of esophageal varices bleeding (5/2022, 1/2023) and rectal varices s/p sclerotherapy 1/2023, ascites presenting with hematemesis. Underwent EGD 2/11  showed oozing portal hypertensive gastropathy. He is now s/p DDLT. He is admitted to the SICU for post-op hemodynamic and respiratory monitoring.       REVIEW OF SYSTEMS: 10 point ROS neg other than the symptoms noted above in the HPI.    PAST MEDICAL HISTORY:    has a past medical history of Acute on chronic anemia (04/08/2022), Alcohol use disorder, Alcohol use disorder, severe, dependence (H) (7/11/2022), Alcoholic cirrhosis of liver with ascites (H), Alcoholic hepatitis, Autism spectrum, Autism spectrum disorder (4/8/2022), Benign essential hypertension, Bleeding esophageal varices (H) (6/18/2022), Hepatic encephalopathy, Hyponatremia (7/28/2022), Major depressive disorder, and Type II Diabetes.    He has no past medical history of Asymptomatic human immunodeficiency virus (HIV) infection status (H), Cancer (H), Complication of anesthesia, Difficult intubation, Heart disease, Malignant hyperthermia, Motion sickness, PONV (postoperative nausea and vomiting), or Sleep apnea.    SURGICAL HISTORY:    has a past surgical history that includes Esophagoscopy, gastroscopy, duodenoscopy (EGD), combined (N/A, 5/24/2022); PICC/Midline Placement (5/26/2022); Esophagoscopy, gastroscopy, duodenoscopy (EGD), combined (N/A, 6/20/2022); PICC/Midline Placement (7/28/2022); Esophagoscopy, gastroscopy, duodenoscopy (EGD), combined (N/A, 1/23/2023); Esophagoscopy, gastroscopy, duodenoscopy (EGD), combined (N/A, 1/25/2023); Endoscopic ultrasound lower gastrointestional tract (GI) (N/A, 1/27/2023); Colonoscopy (N/A, 1/27/2023); Esophagoscopy, gastroscopy, duodenoscopy (EGD), combined (N/A, 1/27/2023); and Esophagoscopy, gastroscopy, duodenoscopy (EGD), combined (N/A, 2/12/2023).    SOCIAL HISTORY:    reports that he has quit smoking. He has never used smokeless tobacco. He reports that he does not currently use alcohol. He reports that he does not currently use drugs after having used the following drugs: Marijuana.    FAMILY HISTORY:  No bleeding/clotting disorders nor problems with anesthesia.     ALLERGIES:    No Known Allergies    MEDICATIONS:  No current facility-administered medications on file prior to encounter.  acetaminophen (TYLENOL) 500 MG tablet, Take 500 mg by mouth daily as needed for pain  FLUoxetine (PROZAC) 40 MG capsule, Take 40 mg by mouth At Bedtime Take in addition to 20 mg capsule for a total of 60 mg at bedtime.  insulin aspart (NOVOLOG FLEXPEN) 100 UNIT/ML pen, 1 unit per 10 grams of carb, plus additional units based on following scale   For Pre-Meal  - 164 give 1 unit. For Pre-Meal  - 189 give 2 units. For Pre-Meal  - 214 give 3 units. For Pre-Meal  - 239 give 4 units. For Pre-Meal  - 264 give 5 units. For Pre-Meal  - 289 give 6 units. For Pre-Meal  - 314 give 7 units. For Pre-Meal  - 339 give 8 units. For Pre-Meal  - 364 give 9 units.  For Pre-Meal BG greater than or equal to 365 give 10 units (Patient taking differently: 1-10 Units 1 unit per 10 grams of carb, plus additional units based on following scale   For Pre-Meal  - 164 give 1 unit. For Pre-Meal  - 189 give 2 units. For Pre-Meal  - 214 give 3 units. For Pre-Meal  - 239 give 4 units. For Pre-Meal  - 264 give 5 units. For Pre-Meal  - 289 give 6 units. For Pre-Meal  - 314 give 7 units. For Pre-Meal  - 339 give 8 units. For Pre-Meal  - 364 give 9 units.  For Pre-Meal BG greater than or equal to 365 give 10 units)  Multiple Vitamins-Minerals (MULTIVITAMIN GUMMIES ADULT) CHEW, Take 2 each by mouth daily  oxyCODONE (ROXICODONE) 5 MG tablet, Take 5 mg by mouth every 12 hours as needed for severe pain (7-10)  bumetanide (BUMEX) 1 MG tablet, Take 1 tablet (1 mg) by mouth daily  FLUoxetine (PROZAC) 20 MG capsule, Take 20 mg by mouth At Bedtime Take in addition to 40 mg capsule for a total of 60 mg at bedtime.  folic acid (FOLVITE) 1 MG tablet, Take 1 tablet (1 mg) by  mouth daily  insulin glargine (LANTUS PEN) 100 UNIT/ML pen, Inject 20 Units Subcutaneous 2 times daily  lactulose (CEPHULAC) 10 GM packet, Take 2 packets (20 g) by mouth 3 times daily  pantoprazole (PROTONIX) 40 MG EC tablet, Take 1 tablet (40 mg) by mouth daily  rifaximin (XIFAXAN) 550 MG TABS tablet, Take 1 tablet (550 mg) by mouth 2 times daily  spironolactone (ALDACTONE) 100 MG tablet, Take 100 mg by mouth daily  thiamine (B-1) 100 MG tablet, Take 1 tablet (100 mg) by mouth in the morning.        PHYSICAL EXAMINATION:  Temp:  [98  F (36.7  C)-98.2  F (36.8  C)] 98.2  F (36.8  C)  Pulse:  [70-80] 72  Resp:  [14-15] 14  BP: ()/(51-82) 95/51  SpO2:  [92 %-97 %] 92 %    General: Patient intubated sedated  Pulmonary: Ventilated  Cardiovascular: Normotensive, not tachycardic  GI: Abdomen soft, not rigid, 2 JUANA drains with mild amount of sanguinous fluid, incision clean dry intact with dressing not saturating in blood  Neuro: Sedated    LABS: Reviewed.   Arterial Blood Gases   Recent Labs   Lab 02/28/23  1444 02/28/23  1354 02/28/23  1237 02/28/23  1204   PH 7.50* 7.53* 7.45 7.41   PCO2 40 36 40 41   PO2 360* 371* 405* 369*   HCO3 32* 30* 27 26     Complete Blood Count   Recent Labs   Lab 02/28/23  1444 02/28/23  1354 02/28/23  1237 02/28/23  1235 02/28/23  1204 02/28/23  0759 02/28/23  0700 02/27/23  1057 02/27/23  0433 02/26/23  0555   WBC  --   --   --   --   --   --  7.6 9.1 8.4 7.0   HGB 8.1* 10.1* 8.8*  --  10.9*   < > 6.8* 7.5* 7.6* 7.6*   PLT  --   --   --  42*  --   --  97* 108* 108* 113*    < > = values in this interval not displayed.     Basic Metabolic Panel  Recent Labs   Lab 02/28/23  1444 02/28/23  1354 02/28/23  1237 02/28/23  1204 02/28/23  0759 02/28/23  0700 02/27/23  1827 02/27/23  1544 02/27/23  0844 02/27/23  0433 02/26/23  1012 02/26/23  0555    134 132* 133   < > 132*  --  134*  --  132*  --  132*   POTASSIUM 4.0 4.3 4.4 4.4   < > 5.3  --  4.8  --  5.0  --  4.3   CHLORIDE  --   --    --   --   --  90*  --  89*  --  90*  --  90*   CO2  --   --   --   --   --  32*  --  34*  --  35*  --  32*   BUN  --   --   --   --   --  79.2*  --  70.9*  --  69.5*  --  70.0*   CR  --   --   --   --   --  1.71*  --  1.43*  --  1.22*  --  1.11   * 111* 162* 140*   < > 77   < > 71   < > 86   < > 418*    < > = values in this interval not displayed.     Liver Function Tests  Recent Labs   Lab 02/28/23  1355 02/28/23  0700 02/27/23  0433 02/26/23  0555 02/25/23  0554   AST  --  83* 85* 92* 77*   ALT  --  42 48 47 40   ALKPHOS  --  111 106 104 102   BILITOTAL  --  6.9* 8.5* 8.4* 7.4*   ALBUMIN  --  3.8 4.1 4.0 4.0   INR 2.12* 1.76* 1.76* 1.82* 1.79*     Pancreatic Enzymes  Recent Labs   Lab 02/27/23  1057 02/24/23  1718   AMYLASE 12* 14*     Coagulation Profile  Recent Labs   Lab 02/28/23  1355 02/28/23  0700 02/27/23  1057 02/27/23  0433 02/26/23  0555 02/25/23  0554 02/24/23  1718   INR 2.12* 1.76*  --  1.76* 1.82*   < > 1.79*   * 46* 44*  --   --   --  47*    < > = values in this interval not displayed.     Lactate  Invalid input(s): LACTATE    IMAGING:  No results found for this or any previous visit (from the past 24 hour(s)).

## 2023-02-28 NOTE — PLAN OF CARE
Neuro: alert/oriented x4. Generalized weakness, but follow all commands. SBA to bathroom. PERRLA.   CV: NSR. ST with activity. A.febrile. +2 edema BLE.  Resp: RA/1-2L while sleeping. Clear/diminished lungs.  GI: NPO for possible liver transplant. Last bowel movement 2/27.  : voids spontaneously   Skin: jaundiced.       Plan: liver transplant   For vital signs and complete assessments, please see documentation flowsheets.      Patient left for OR at 0500 for his liver transplant. Mom, Leticia, took most belongings. Other belongings transferred to unit 4A.

## 2023-03-01 ENCOUNTER — ANESTHESIA (OUTPATIENT)
Dept: SURGERY | Facility: CLINIC | Age: 28
DRG: 005 | End: 2023-03-01
Payer: COMMERCIAL

## 2023-03-01 ENCOUNTER — DOCUMENTATION ONLY (OUTPATIENT)
Dept: TRANSPLANT | Facility: CLINIC | Age: 28
End: 2023-03-01
Payer: COMMERCIAL

## 2023-03-01 ENCOUNTER — APPOINTMENT (OUTPATIENT)
Dept: GENERAL RADIOLOGY | Facility: CLINIC | Age: 28
DRG: 005 | End: 2023-03-01
Attending: STUDENT IN AN ORGANIZED HEALTH CARE EDUCATION/TRAINING PROGRAM
Payer: COMMERCIAL

## 2023-03-01 ENCOUNTER — ANESTHESIA EVENT (OUTPATIENT)
Dept: SURGERY | Facility: CLINIC | Age: 28
DRG: 005 | End: 2023-03-01
Payer: COMMERCIAL

## 2023-03-01 ENCOUNTER — APPOINTMENT (OUTPATIENT)
Dept: ULTRASOUND IMAGING | Facility: CLINIC | Age: 28
DRG: 005 | End: 2023-03-01
Attending: STUDENT IN AN ORGANIZED HEALTH CARE EDUCATION/TRAINING PROGRAM
Payer: COMMERCIAL

## 2023-03-01 LAB
ALBUMIN SERPL BCG-MCNC: 1.9 G/DL (ref 3.5–5.2)
ALBUMIN SERPL BCG-MCNC: 2.3 G/DL (ref 3.5–5.2)
ALBUMIN SERPL BCG-MCNC: 2.3 G/DL (ref 3.5–5.2)
ALBUMIN SERPL BCG-MCNC: 2.4 G/DL (ref 3.5–5.2)
ALBUMIN SERPL BCG-MCNC: 2.8 G/DL (ref 3.5–5.2)
ALBUMIN SERPL BCG-MCNC: 3.1 G/DL (ref 3.5–5.2)
ALLEN'S TEST: ABNORMAL
ALLEN'S TEST: NO
ALP SERPL-CCNC: 37 U/L (ref 40–129)
ALP SERPL-CCNC: 40 U/L (ref 40–129)
ALP SERPL-CCNC: 43 U/L (ref 40–129)
ALP SERPL-CCNC: 53 U/L (ref 40–129)
ALT SERPL W P-5'-P-CCNC: 268 U/L (ref 10–50)
ALT SERPL W P-5'-P-CCNC: 306 U/L (ref 10–50)
ALT SERPL W P-5'-P-CCNC: 402 U/L (ref 10–50)
ALT SERPL W P-5'-P-CCNC: 769 U/L (ref 10–50)
AMYLASE SERPL-CCNC: 62 U/L (ref 28–100)
ANION GAP SERPL CALCULATED.3IONS-SCNC: 11 MMOL/L (ref 7–15)
ANION GAP SERPL CALCULATED.3IONS-SCNC: 12 MMOL/L (ref 7–15)
ANION GAP SERPL CALCULATED.3IONS-SCNC: 13 MMOL/L (ref 7–15)
ANION GAP SERPL CALCULATED.3IONS-SCNC: 14 MMOL/L (ref 7–15)
ANION GAP SERPL CALCULATED.3IONS-SCNC: 14 MMOL/L (ref 7–15)
APTT PPP: 34 SECONDS (ref 22–38)
APTT PPP: 39 SECONDS (ref 22–38)
APTT PPP: 40 SECONDS (ref 22–38)
APTT PPP: 41 SECONDS (ref 22–38)
AST SERPL W P-5'-P-CCNC: 347 U/L (ref 10–50)
AST SERPL W P-5'-P-CCNC: 410 U/L (ref 10–50)
AST SERPL W P-5'-P-CCNC: 500 U/L (ref 10–50)
AST SERPL W P-5'-P-CCNC: 920 U/L (ref 10–50)
BACTERIA UR CULT: NORMAL
BASE EXCESS BLDA CALC-SCNC: -0.4 MMOL/L (ref -9.6–2)
BASE EXCESS BLDA CALC-SCNC: -1.1 MMOL/L (ref -9.6–2)
BASE EXCESS BLDA CALC-SCNC: -1.7 MMOL/L (ref -9–1.8)
BASE EXCESS BLDA CALC-SCNC: 0.6 MMOL/L (ref -9–1.8)
BASOPHILS # BLD AUTO: 0 10E3/UL (ref 0–0.2)
BASOPHILS NFR BLD AUTO: 0 %
BILIRUB DIRECT SERPL-MCNC: 2.38 MG/DL (ref 0–0.3)
BILIRUB SERPL-MCNC: 4.9 MG/DL
BILIRUB SERPL-MCNC: 5.2 MG/DL
BILIRUB SERPL-MCNC: 5.2 MG/DL
BILIRUB SERPL-MCNC: 5.4 MG/DL
BLD PROD TYP BPU: NORMAL
BLOOD COMPONENT TYPE: NORMAL
BUN SERPL-MCNC: 51.8 MG/DL (ref 6–20)
BUN SERPL-MCNC: 57 MG/DL (ref 6–20)
BUN SERPL-MCNC: 60.2 MG/DL (ref 6–20)
BUN SERPL-MCNC: 62 MG/DL (ref 6–20)
BUN SERPL-MCNC: 63.3 MG/DL (ref 6–20)
CA-I BLD-MCNC: 4.7 MG/DL (ref 4.4–5.2)
CA-I BLD-MCNC: 4.7 MG/DL (ref 4.4–5.2)
CA-I BLD-MCNC: 4.8 MG/DL (ref 4.4–5.2)
CA-I BLD-MCNC: 5.1 MG/DL (ref 4.4–5.2)
CA-I BLD-MCNC: 5.1 MG/DL (ref 4.4–5.2)
CALCIUM SERPL-MCNC: 8.9 MG/DL (ref 8.6–10)
CALCIUM SERPL-MCNC: 8.9 MG/DL (ref 8.6–10)
CALCIUM SERPL-MCNC: 9.2 MG/DL (ref 8.6–10)
CALCIUM SERPL-MCNC: 9.3 MG/DL (ref 8.6–10)
CALCIUM SERPL-MCNC: 9.4 MG/DL (ref 8.6–10)
CHLORIDE SERPL-SCNC: 101 MMOL/L (ref 98–107)
CHLORIDE SERPL-SCNC: 102 MMOL/L (ref 98–107)
CHLORIDE SERPL-SCNC: 102 MMOL/L (ref 98–107)
CHLORIDE SERPL-SCNC: 103 MMOL/L (ref 98–107)
CODING SYSTEM: NORMAL
CREAT SERPL-MCNC: 1.53 MG/DL (ref 0.67–1.17)
CREAT SERPL-MCNC: 1.63 MG/DL (ref 0.67–1.17)
CREAT SERPL-MCNC: 1.67 MG/DL (ref 0.67–1.17)
CREAT SERPL-MCNC: 1.77 MG/DL (ref 0.67–1.17)
CROSSMATCH: NORMAL
DEPRECATED HCO3 PLAS-SCNC: 24 MMOL/L (ref 22–29)
DEPRECATED HCO3 PLAS-SCNC: 25 MMOL/L (ref 22–29)
EOSINOPHIL # BLD AUTO: 0 10E3/UL (ref 0–0.7)
EOSINOPHIL NFR BLD AUTO: 0 %
ERYTHROCYTE [DISTWIDTH] IN BLOOD BY AUTOMATED COUNT: 14.5 % (ref 10–15)
ERYTHROCYTE [DISTWIDTH] IN BLOOD BY AUTOMATED COUNT: 14.6 % (ref 10–15)
ERYTHROCYTE [DISTWIDTH] IN BLOOD BY AUTOMATED COUNT: 14.8 % (ref 10–15)
ERYTHROCYTE [DISTWIDTH] IN BLOOD BY AUTOMATED COUNT: 16.7 % (ref 10–15)
ERYTHROCYTE [DISTWIDTH] IN BLOOD BY AUTOMATED COUNT: 16.8 % (ref 10–15)
FIBRINOGEN PPP-MCNC: 150 MG/DL (ref 170–490)
FIBRINOGEN PPP-MCNC: 165 MG/DL (ref 170–490)
FIBRINOGEN PPP-MCNC: 171 MG/DL (ref 170–490)
FIBRINOGEN PPP-MCNC: 187 MG/DL (ref 170–490)
FIBRINOGEN PPP-MCNC: 200 MG/DL (ref 170–490)
FIBRINOGEN PPP-MCNC: 206 MG/DL (ref 170–490)
GFR SERPL CREATININE-BSD FRML MDRD: 53 ML/MIN/1.73M2
GFR SERPL CREATININE-BSD FRML MDRD: 56 ML/MIN/1.73M2
GFR SERPL CREATININE-BSD FRML MDRD: 58 ML/MIN/1.73M2
GFR SERPL CREATININE-BSD FRML MDRD: 63 ML/MIN/1.73M2
GLUCOSE BLD-MCNC: 172 MG/DL (ref 70–99)
GLUCOSE BLD-MCNC: 192 MG/DL (ref 70–99)
GLUCOSE BLDC GLUCOMTR-MCNC: 110 MG/DL (ref 70–99)
GLUCOSE BLDC GLUCOMTR-MCNC: 110 MG/DL (ref 70–99)
GLUCOSE BLDC GLUCOMTR-MCNC: 116 MG/DL (ref 70–99)
GLUCOSE BLDC GLUCOMTR-MCNC: 119 MG/DL (ref 70–99)
GLUCOSE BLDC GLUCOMTR-MCNC: 120 MG/DL (ref 70–99)
GLUCOSE BLDC GLUCOMTR-MCNC: 121 MG/DL (ref 70–99)
GLUCOSE BLDC GLUCOMTR-MCNC: 142 MG/DL (ref 70–99)
GLUCOSE BLDC GLUCOMTR-MCNC: 146 MG/DL (ref 70–99)
GLUCOSE BLDC GLUCOMTR-MCNC: 151 MG/DL (ref 70–99)
GLUCOSE BLDC GLUCOMTR-MCNC: 159 MG/DL (ref 70–99)
GLUCOSE BLDC GLUCOMTR-MCNC: 160 MG/DL (ref 70–99)
GLUCOSE BLDC GLUCOMTR-MCNC: 164 MG/DL (ref 70–99)
GLUCOSE BLDC GLUCOMTR-MCNC: 167 MG/DL (ref 70–99)
GLUCOSE BLDC GLUCOMTR-MCNC: 167 MG/DL (ref 70–99)
GLUCOSE BLDC GLUCOMTR-MCNC: 171 MG/DL (ref 70–99)
GLUCOSE BLDC GLUCOMTR-MCNC: 191 MG/DL (ref 70–99)
GLUCOSE BLDC GLUCOMTR-MCNC: 192 MG/DL (ref 70–99)
GLUCOSE BLDC GLUCOMTR-MCNC: 204 MG/DL (ref 70–99)
GLUCOSE BLDC GLUCOMTR-MCNC: 223 MG/DL (ref 70–99)
GLUCOSE BLDC GLUCOMTR-MCNC: 329 MG/DL (ref 70–99)
GLUCOSE BLDC GLUCOMTR-MCNC: 62 MG/DL (ref 70–99)
GLUCOSE BLDC GLUCOMTR-MCNC: 87 MG/DL (ref 70–99)
GLUCOSE SERPL-MCNC: 114 MG/DL (ref 70–99)
GLUCOSE SERPL-MCNC: 128 MG/DL (ref 70–99)
GLUCOSE SERPL-MCNC: 158 MG/DL (ref 70–99)
GLUCOSE SERPL-MCNC: 170 MG/DL (ref 70–99)
GLUCOSE SERPL-MCNC: 191 MG/DL (ref 70–99)
HCO3 BLD-SCNC: 22 MMOL/L (ref 21–28)
HCO3 BLD-SCNC: 23 MMOL/L (ref 21–28)
HCO3 BLDA-SCNC: 23 MMOL/L (ref 21–28)
HCO3 BLDA-SCNC: 25 MMOL/L (ref 21–28)
HCT VFR BLD AUTO: 14.2 % (ref 40–53)
HCT VFR BLD AUTO: 19 % (ref 40–53)
HCT VFR BLD AUTO: 21.6 % (ref 40–53)
HCT VFR BLD AUTO: 23.2 % (ref 40–53)
HCT VFR BLD AUTO: 26.6 % (ref 40–53)
HGB BLD-MCNC: 10.2 G/DL (ref 13.3–17.7)
HGB BLD-MCNC: 10.3 G/DL (ref 13.3–17.7)
HGB BLD-MCNC: 5.1 G/DL (ref 13.3–17.7)
HGB BLD-MCNC: 6.6 G/DL (ref 13.3–17.7)
HGB BLD-MCNC: 7.6 G/DL (ref 13.3–17.7)
HGB BLD-MCNC: 8.3 G/DL (ref 13.3–17.7)
HGB BLD-MCNC: 8.4 G/DL (ref 13.3–17.7)
HGB BLD-MCNC: 9 G/DL (ref 13.3–17.7)
IMM GRANULOCYTES # BLD: 0 10E3/UL
IMM GRANULOCYTES # BLD: 0.1 10E3/UL
IMM GRANULOCYTES # BLD: 0.2 10E3/UL
IMM GRANULOCYTES NFR BLD: 1 %
IMM GRANULOCYTES NFR BLD: 1 %
IMM GRANULOCYTES NFR BLD: 3 %
INR PPP: 1.79 (ref 0.85–1.15)
INR PPP: 1.91 (ref 0.85–1.15)
INR PPP: 2.06 (ref 0.85–1.15)
INR PPP: 2.1 (ref 0.85–1.15)
INR PPP: 2.15 (ref 0.85–1.15)
INR PPP: 2.32 (ref 0.85–1.15)
INR PPP: 2.62 (ref 0.85–1.15)
ISSUE DATE AND TIME: NORMAL
LACTATE BLD-SCNC: 2.2 MMOL/L
LACTATE BLD-SCNC: 2.8 MMOL/L
LACTATE SERPL-SCNC: 2.2 MMOL/L (ref 0.7–2)
LACTATE SERPL-SCNC: 2.2 MMOL/L (ref 0.7–2)
LACTATE SERPL-SCNC: 2.8 MMOL/L (ref 0.7–2)
LACTATE SERPL-SCNC: 5.6 MMOL/L (ref 0.7–2)
LACTATE SERPL-SCNC: 6.3 MMOL/L (ref 0.7–2)
LIPASE SERPL-CCNC: 19 U/L (ref 13–60)
LYMPHOCYTES # BLD AUTO: 0.3 10E3/UL (ref 0.8–5.3)
LYMPHOCYTES # BLD AUTO: 0.5 10E3/UL (ref 0.8–5.3)
LYMPHOCYTES # BLD AUTO: 1.3 10E3/UL (ref 0.8–5.3)
LYMPHOCYTES NFR BLD AUTO: 18 %
LYMPHOCYTES NFR BLD AUTO: 7 %
LYMPHOCYTES NFR BLD AUTO: 9 %
MAGNESIUM SERPL-MCNC: 1.7 MG/DL (ref 1.7–2.3)
MAGNESIUM SERPL-MCNC: 2 MG/DL (ref 1.7–2.3)
MAGNESIUM SERPL-MCNC: 2.1 MG/DL (ref 1.7–2.3)
MAGNESIUM SERPL-MCNC: 2.3 MG/DL (ref 1.7–2.3)
MAGNESIUM SERPL-MCNC: 2.5 MG/DL (ref 1.7–2.3)
MCH RBC QN AUTO: 30.3 PG (ref 26.5–33)
MCH RBC QN AUTO: 30.8 PG (ref 26.5–33)
MCH RBC QN AUTO: 30.9 PG (ref 26.5–33)
MCH RBC QN AUTO: 31.3 PG (ref 26.5–33)
MCH RBC QN AUTO: 31.5 PG (ref 26.5–33)
MCHC RBC AUTO-ENTMCNC: 33.8 G/DL (ref 31.5–36.5)
MCHC RBC AUTO-ENTMCNC: 34.7 G/DL (ref 31.5–36.5)
MCHC RBC AUTO-ENTMCNC: 35.2 G/DL (ref 31.5–36.5)
MCHC RBC AUTO-ENTMCNC: 35.8 G/DL (ref 31.5–36.5)
MCHC RBC AUTO-ENTMCNC: 35.9 G/DL (ref 31.5–36.5)
MCV RBC AUTO: 86 FL (ref 78–100)
MCV RBC AUTO: 87 FL (ref 78–100)
MCV RBC AUTO: 89 FL (ref 78–100)
MCV RBC AUTO: 90 FL (ref 78–100)
MCV RBC AUTO: 90 FL (ref 78–100)
MONOCYTES # BLD AUTO: 0.1 10E3/UL (ref 0–1.3)
MONOCYTES # BLD AUTO: 0.2 10E3/UL (ref 0–1.3)
MONOCYTES # BLD AUTO: 0.3 10E3/UL (ref 0–1.3)
MONOCYTES NFR BLD AUTO: 3 %
MONOCYTES NFR BLD AUTO: 3 %
MONOCYTES NFR BLD AUTO: 6 %
NEUTROPHILS # BLD AUTO: 3.4 10E3/UL (ref 1.6–8.3)
NEUTROPHILS # BLD AUTO: 4.1 10E3/UL (ref 1.6–8.3)
NEUTROPHILS # BLD AUTO: 5.4 10E3/UL (ref 1.6–8.3)
NEUTROPHILS NFR BLD AUTO: 76 %
NEUTROPHILS NFR BLD AUTO: 84 %
NEUTROPHILS NFR BLD AUTO: 89 %
NRBC # BLD AUTO: 0 10E3/UL
NRBC BLD AUTO-RTO: 0 /100
O2/TOTAL GAS SETTING VFR VENT: 30 %
O2/TOTAL GAS SETTING VFR VENT: 40 %
O2/TOTAL GAS SETTING VFR VENT: 40 %
O2/TOTAL GAS SETTING VFR VENT: 60 %
PCO2 BLD: 22 MM HG (ref 35–45)
PCO2 BLD: 40 MM HG (ref 35–45)
PCO2 BLDA: 37 MM HG (ref 35–45)
PCO2 BLDA: 42 MM HG (ref 35–45)
PH BLD: 7.37 [PH] (ref 7.35–7.45)
PH BLD: 7.61 [PH] (ref 7.35–7.45)
PH BLDA: 7.38 [PH] (ref 7.35–7.45)
PH BLDA: 7.41 [PH] (ref 7.35–7.45)
PHOSPHATE SERPL-MCNC: 6.5 MG/DL (ref 2.5–4.5)
PHOSPHATE SERPL-MCNC: 6.7 MG/DL (ref 2.5–4.5)
PHOSPHATE SERPL-MCNC: 6.7 MG/DL (ref 2.5–4.5)
PHOSPHATE SERPL-MCNC: 7.5 MG/DL (ref 2.5–4.5)
PHOSPHATE SERPL-MCNC: 7.5 MG/DL (ref 2.5–4.5)
PHOSPHATE SERPL-MCNC: 8.3 MG/DL (ref 2.5–4.5)
PLATELET # BLD AUTO: 30 10E3/UL (ref 150–450)
PLATELET # BLD AUTO: 31 10E3/UL (ref 150–450)
PLATELET # BLD AUTO: 41 10E3/UL (ref 150–450)
PLATELET # BLD AUTO: 43 10E3/UL (ref 150–450)
PLATELET # BLD AUTO: 68 10E3/UL (ref 150–450)
PLATELET # BLD AUTO: 86 10E3/UL (ref 150–450)
PO2 BLD: 114 MM HG (ref 80–105)
PO2 BLD: 202 MM HG (ref 80–105)
PO2 BLDA: 112 MM HG (ref 80–105)
PO2 BLDA: 225 MM HG (ref 80–105)
POTASSIUM BLD-SCNC: 4.8 MMOL/L (ref 3.5–5)
POTASSIUM BLD-SCNC: 5.2 MMOL/L (ref 3.5–5)
POTASSIUM SERPL-SCNC: 4.8 MMOL/L (ref 3.4–5.3)
POTASSIUM SERPL-SCNC: 5.2 MMOL/L (ref 3.4–5.3)
POTASSIUM SERPL-SCNC: 5.6 MMOL/L (ref 3.4–5.3)
POTASSIUM SERPL-SCNC: 5.7 MMOL/L (ref 3.4–5.3)
POTASSIUM SERPL-SCNC: 6.2 MMOL/L (ref 3.4–5.3)
PROT SERPL-MCNC: 3.2 G/DL (ref 6.4–8.3)
PROT SERPL-MCNC: 3.3 G/DL (ref 6.4–8.3)
PROT SERPL-MCNC: 3.6 G/DL (ref 6.4–8.3)
PROT SERPL-MCNC: 3.9 G/DL (ref 6.4–8.3)
RBC # BLD AUTO: 1.63 10E6/UL (ref 4.4–5.9)
RBC # BLD AUTO: 2.14 10E6/UL (ref 4.4–5.9)
RBC # BLD AUTO: 2.41 10E6/UL (ref 4.4–5.9)
RBC # BLD AUTO: 2.69 10E6/UL (ref 4.4–5.9)
RBC # BLD AUTO: 2.97 10E6/UL (ref 4.4–5.9)
SODIUM BLD-SCNC: 136 MMOL/L (ref 133–144)
SODIUM BLD-SCNC: 137 MMOL/L (ref 133–144)
SODIUM SERPL-SCNC: 138 MMOL/L (ref 136–145)
SODIUM SERPL-SCNC: 139 MMOL/L (ref 136–145)
SODIUM SERPL-SCNC: 139 MMOL/L (ref 136–145)
SODIUM SERPL-SCNC: 140 MMOL/L (ref 136–145)
SODIUM SERPL-SCNC: 142 MMOL/L (ref 136–145)
UNIT ABO/RH: NORMAL
UNIT NUMBER: NORMAL
UNIT STATUS: NORMAL
UNIT TYPE ISBT: 600
UNIT TYPE ISBT: 6200
UNIT TYPE ISBT: 8400
UNIT TYPE ISBT: 8400
UNIT TYPE ISBT: 9500
WBC # BLD AUTO: 3.8 10E3/UL (ref 4–11)
WBC # BLD AUTO: 4.9 10E3/UL (ref 4–11)
WBC # BLD AUTO: 6.4 10E3/UL (ref 4–11)
WBC # BLD AUTO: 7.1 10E3/UL (ref 4–11)
WBC # BLD AUTO: 8.9 10E3/UL (ref 4–11)

## 2023-03-01 PROCEDURE — 85027 COMPLETE CBC AUTOMATED: CPT | Performed by: SURGERY

## 2023-03-01 PROCEDURE — 82248 BILIRUBIN DIRECT: CPT | Performed by: SURGERY

## 2023-03-01 PROCEDURE — 258N000003 HC RX IP 258 OP 636: Performed by: SURGERY

## 2023-03-01 PROCEDURE — 5A1D90Z PERFORMANCE OF URINARY FILTRATION, CONTINUOUS, GREATER THAN 18 HOURS PER DAY: ICD-10-PCS | Performed by: STUDENT IN AN ORGANIZED HEALTH CARE EDUCATION/TRAINING PROGRAM

## 2023-03-01 PROCEDURE — P9059 PLASMA, FRZ BETWEEN 8-24HOUR: HCPCS

## 2023-03-01 PROCEDURE — 250N000011 HC RX IP 250 OP 636: Performed by: NURSE ANESTHETIST, CERTIFIED REGISTERED

## 2023-03-01 PROCEDURE — 999N000157 HC STATISTIC RCP TIME EA 10 MIN

## 2023-03-01 PROCEDURE — P9016 RBC LEUKOCYTES REDUCED: HCPCS | Performed by: STUDENT IN AN ORGANIZED HEALTH CARE EDUCATION/TRAINING PROGRAM

## 2023-03-01 PROCEDURE — 250N000011 HC RX IP 250 OP 636: Performed by: NURSE PRACTITIONER

## 2023-03-01 PROCEDURE — P9037 PLATE PHERES LEUKOREDU IRRAD: HCPCS

## 2023-03-01 PROCEDURE — 80053 COMPREHEN METABOLIC PANEL: CPT | Performed by: STUDENT IN AN ORGANIZED HEALTH CARE EDUCATION/TRAINING PROGRAM

## 2023-03-01 PROCEDURE — 250N000011 HC RX IP 250 OP 636: Performed by: STUDENT IN AN ORGANIZED HEALTH CARE EDUCATION/TRAINING PROGRAM

## 2023-03-01 PROCEDURE — 84100 ASSAY OF PHOSPHORUS: CPT | Performed by: STUDENT IN AN ORGANIZED HEALTH CARE EDUCATION/TRAINING PROGRAM

## 2023-03-01 PROCEDURE — 83735 ASSAY OF MAGNESIUM: CPT | Performed by: SURGERY

## 2023-03-01 PROCEDURE — 85018 HEMOGLOBIN: CPT | Performed by: STUDENT IN AN ORGANIZED HEALTH CARE EDUCATION/TRAINING PROGRAM

## 2023-03-01 PROCEDURE — 83735 ASSAY OF MAGNESIUM: CPT | Performed by: INTERNAL MEDICINE

## 2023-03-01 PROCEDURE — P9045 ALBUMIN (HUMAN), 5%, 250 ML: HCPCS

## 2023-03-01 PROCEDURE — 83605 ASSAY OF LACTIC ACID: CPT | Performed by: SURGERY

## 2023-03-01 PROCEDURE — 200N000002 HC R&B ICU UMMC

## 2023-03-01 PROCEDURE — 250N000011 HC RX IP 250 OP 636

## 2023-03-01 PROCEDURE — C9113 INJ PANTOPRAZOLE SODIUM, VIA: HCPCS | Performed by: STUDENT IN AN ORGANIZED HEALTH CARE EDUCATION/TRAINING PROGRAM

## 2023-03-01 PROCEDURE — 83690 ASSAY OF LIPASE: CPT | Performed by: SURGERY

## 2023-03-01 PROCEDURE — 258N000003 HC RX IP 258 OP 636: Performed by: STUDENT IN AN ORGANIZED HEALTH CARE EDUCATION/TRAINING PROGRAM

## 2023-03-01 PROCEDURE — P9016 RBC LEUKOCYTES REDUCED: HCPCS

## 2023-03-01 PROCEDURE — 250N000013 HC RX MED GY IP 250 OP 250 PS 637: Performed by: SURGERY

## 2023-03-01 PROCEDURE — 82150 ASSAY OF AMYLASE: CPT | Performed by: SURGERY

## 2023-03-01 PROCEDURE — 258N000001 HC RX 258: Performed by: SURGERY

## 2023-03-01 PROCEDURE — 85025 COMPLETE CBC W/AUTO DIFF WBC: CPT | Performed by: SURGERY

## 2023-03-01 PROCEDURE — 3E043XZ INTRODUCTION OF VASOPRESSOR INTO CENTRAL VEIN, PERCUTANEOUS APPROACH: ICD-10-PCS | Performed by: STUDENT IN AN ORGANIZED HEALTH CARE EDUCATION/TRAINING PROGRAM

## 2023-03-01 PROCEDURE — 250N000012 HC RX MED GY IP 250 OP 636 PS 637: Performed by: SURGERY

## 2023-03-01 PROCEDURE — 85384 FIBRINOGEN ACTIVITY: CPT

## 2023-03-01 PROCEDURE — 90947 DIALYSIS REPEATED EVAL: CPT

## 2023-03-01 PROCEDURE — 85730 THROMBOPLASTIN TIME PARTIAL: CPT | Performed by: SURGERY

## 2023-03-01 PROCEDURE — 999N000065 XR ABDOMEN PORT 1 VIEW

## 2023-03-01 PROCEDURE — 84155 ASSAY OF PROTEIN SERUM: CPT | Performed by: SURGERY

## 2023-03-01 PROCEDURE — 250N000025 HC SEVOFLURANE, PER MIN: Performed by: SURGERY

## 2023-03-01 PROCEDURE — 370N000017 HC ANESTHESIA TECHNICAL FEE, PER MIN: Performed by: SURGERY

## 2023-03-01 PROCEDURE — 85384 FIBRINOGEN ACTIVITY: CPT | Performed by: SURGERY

## 2023-03-01 PROCEDURE — 82330 ASSAY OF CALCIUM: CPT | Performed by: SURGERY

## 2023-03-01 PROCEDURE — P9012 CRYOPRECIPITATE EACH UNIT: HCPCS

## 2023-03-01 PROCEDURE — 82803 BLOOD GASES ANY COMBINATION: CPT

## 2023-03-01 PROCEDURE — 74018 RADEX ABDOMEN 1 VIEW: CPT | Mod: 26 | Performed by: STUDENT IN AN ORGANIZED HEALTH CARE EDUCATION/TRAINING PROGRAM

## 2023-03-01 PROCEDURE — 258N000001 HC RX 258: Performed by: STUDENT IN AN ORGANIZED HEALTH CARE EDUCATION/TRAINING PROGRAM

## 2023-03-01 PROCEDURE — 360N000078 HC SURGERY LEVEL 5, PER MIN: Performed by: SURGERY

## 2023-03-01 PROCEDURE — 250N000011 HC RX IP 250 OP 636: Performed by: INTERNAL MEDICINE

## 2023-03-01 PROCEDURE — 85610 PROTHROMBIN TIME: CPT

## 2023-03-01 PROCEDURE — 83605 ASSAY OF LACTIC ACID: CPT | Performed by: STUDENT IN AN ORGANIZED HEALTH CARE EDUCATION/TRAINING PROGRAM

## 2023-03-01 PROCEDURE — 85025 COMPLETE CBC W/AUTO DIFF WBC: CPT | Performed by: STUDENT IN AN ORGANIZED HEALTH CARE EDUCATION/TRAINING PROGRAM

## 2023-03-01 PROCEDURE — 250N000009 HC RX 250: Performed by: STUDENT IN AN ORGANIZED HEALTH CARE EDUCATION/TRAINING PROGRAM

## 2023-03-01 PROCEDURE — 258N000001 HC RX 258: Performed by: NURSE PRACTITIONER

## 2023-03-01 PROCEDURE — 85610 PROTHROMBIN TIME: CPT | Performed by: SURGERY

## 2023-03-01 PROCEDURE — 83735 ASSAY OF MAGNESIUM: CPT | Performed by: STUDENT IN AN ORGANIZED HEALTH CARE EDUCATION/TRAINING PROGRAM

## 2023-03-01 PROCEDURE — 999N000015 HC STATISTIC ARTERIAL MONITORING DAILY

## 2023-03-01 PROCEDURE — 258N000003 HC RX IP 258 OP 636: Performed by: NURSE ANESTHETIST, CERTIFIED REGISTERED

## 2023-03-01 PROCEDURE — 84450 TRANSFERASE (AST) (SGOT): CPT | Performed by: SURGERY

## 2023-03-01 PROCEDURE — 82330 ASSAY OF CALCIUM: CPT | Performed by: INTERNAL MEDICINE

## 2023-03-01 PROCEDURE — P9045 ALBUMIN (HUMAN), 5%, 250 ML: HCPCS | Performed by: STUDENT IN AN ORGANIZED HEALTH CARE EDUCATION/TRAINING PROGRAM

## 2023-03-01 PROCEDURE — P9012 CRYOPRECIPITATE EACH UNIT: HCPCS | Performed by: STUDENT IN AN ORGANIZED HEALTH CARE EDUCATION/TRAINING PROGRAM

## 2023-03-01 PROCEDURE — 84132 ASSAY OF SERUM POTASSIUM: CPT | Performed by: INTERNAL MEDICINE

## 2023-03-01 PROCEDURE — 84100 ASSAY OF PHOSPHORUS: CPT | Performed by: SURGERY

## 2023-03-01 PROCEDURE — 94003 VENT MGMT INPAT SUBQ DAY: CPT

## 2023-03-01 PROCEDURE — P9059 PLASMA, FRZ BETWEEN 8-24HOUR: HCPCS | Performed by: STUDENT IN AN ORGANIZED HEALTH CARE EDUCATION/TRAINING PROGRAM

## 2023-03-01 PROCEDURE — 250N000011 HC RX IP 250 OP 636: Performed by: SURGERY

## 2023-03-01 PROCEDURE — 272N000001 HC OR GENERAL SUPPLY STERILE: Performed by: SURGERY

## 2023-03-01 PROCEDURE — 82330 ASSAY OF CALCIUM: CPT

## 2023-03-01 PROCEDURE — 85049 AUTOMATED PLATELET COUNT: CPT | Performed by: STUDENT IN AN ORGANIZED HEALTH CARE EDUCATION/TRAINING PROGRAM

## 2023-03-01 PROCEDURE — 93975 VASCULAR STUDY: CPT

## 2023-03-01 PROCEDURE — 84132 ASSAY OF SERUM POTASSIUM: CPT | Performed by: STUDENT IN AN ORGANIZED HEALTH CARE EDUCATION/TRAINING PROGRAM

## 2023-03-01 PROCEDURE — 35840 EXPLORE ABDOMINAL VESSELS: CPT | Mod: 78 | Performed by: SURGERY

## 2023-03-01 PROCEDURE — 250N000009 HC RX 250: Performed by: INTERNAL MEDICINE

## 2023-03-01 PROCEDURE — 82803 BLOOD GASES ANY COMBINATION: CPT | Performed by: STUDENT IN AN ORGANIZED HEALTH CARE EDUCATION/TRAINING PROGRAM

## 2023-03-01 PROCEDURE — 250N000009 HC RX 250: Performed by: NURSE ANESTHETIST, CERTIFIED REGISTERED

## 2023-03-01 PROCEDURE — 85730 THROMBOPLASTIN TIME PARTIAL: CPT | Performed by: STUDENT IN AN ORGANIZED HEALTH CARE EDUCATION/TRAINING PROGRAM

## 2023-03-01 PROCEDURE — 250N000013 HC RX MED GY IP 250 OP 250 PS 637: Performed by: STUDENT IN AN ORGANIZED HEALTH CARE EDUCATION/TRAINING PROGRAM

## 2023-03-01 PROCEDURE — 99233 SBSQ HOSP IP/OBS HIGH 50: CPT | Mod: 24 | Performed by: INTERNAL MEDICINE

## 2023-03-01 PROCEDURE — 258N000003 HC RX IP 258 OP 636: Performed by: NURSE PRACTITIONER

## 2023-03-01 PROCEDURE — 93975 VASCULAR STUDY: CPT | Mod: 26 | Performed by: RADIOLOGY

## 2023-03-01 RX ORDER — CALCIUM GLUCONATE 20 MG/ML
1 INJECTION, SOLUTION INTRAVENOUS ONCE
Status: COMPLETED | OUTPATIENT
Start: 2023-03-01 | End: 2023-03-01

## 2023-03-01 RX ORDER — DEXTROSE MONOHYDRATE 25 G/50ML
25 INJECTION, SOLUTION INTRAVENOUS ONCE
Status: COMPLETED | OUTPATIENT
Start: 2023-03-01 | End: 2023-03-01

## 2023-03-01 RX ORDER — CALCIUM CHLORIDE, MAGNESIUM CHLORIDE, DEXTROSE MONOHYDRATE, LACTIC ACID, SODIUM CHLORIDE, SODIUM BICARBONATE AND POTASSIUM CHLORIDE 5.15; 2.03; 22; 5.4; 6.46; 3.09; .157 G/L; G/L; G/L; G/L; G/L; G/L; G/L
20 INJECTION INTRAVENOUS CONTINUOUS
Status: DISCONTINUED | OUTPATIENT
Start: 2023-03-01 | End: 2023-03-01 | Stop reason: HOSPADM

## 2023-03-01 RX ORDER — CALCIUM CHLORIDE, MAGNESIUM CHLORIDE, DEXTROSE MONOHYDRATE, LACTIC ACID, SODIUM CHLORIDE, SODIUM BICARBONATE AND POTASSIUM CHLORIDE 5.15; 2.03; 22; 5.4; 6.46; 3.09; .157 G/L; G/L; G/L; G/L; G/L; G/L; G/L
INJECTION INTRAVENOUS CONTINUOUS
Status: DISCONTINUED | OUTPATIENT
Start: 2023-03-01 | End: 2023-03-01

## 2023-03-01 RX ORDER — NICOTINE POLACRILEX 4 MG
15-30 LOZENGE BUCCAL
Status: DISCONTINUED | OUTPATIENT
Start: 2023-03-01 | End: 2023-03-01

## 2023-03-01 RX ORDER — NICOTINE POLACRILEX 4 MG
15-30 LOZENGE BUCCAL
Status: DISCONTINUED | OUTPATIENT
Start: 2023-03-01 | End: 2023-03-11 | Stop reason: DRUGHIGH

## 2023-03-01 RX ORDER — CALCIUM CHLORIDE, MAGNESIUM CHLORIDE, DEXTROSE MONOHYDRATE, LACTIC ACID, SODIUM CHLORIDE, SODIUM BICARBONATE AND POTASSIUM CHLORIDE 5.15; 2.03; 22; 5.4; 6.46; 3.09; .157 G/L; G/L; G/L; G/L; G/L; G/L; G/L
12.5 INJECTION INTRAVENOUS CONTINUOUS
Status: DISCONTINUED | OUTPATIENT
Start: 2023-03-01 | End: 2023-03-02

## 2023-03-01 RX ORDER — CALCIUM CHLORIDE, MAGNESIUM CHLORIDE, DEXTROSE MONOHYDRATE, LACTIC ACID, SODIUM CHLORIDE, SODIUM BICARBONATE AND POTASSIUM CHLORIDE 5.15; 2.03; 22; 5.4; 6.46; 3.09; .157 G/L; G/L; G/L; G/L; G/L; G/L; G/L
12.5 INJECTION INTRAVENOUS CONTINUOUS
Status: DISCONTINUED | OUTPATIENT
Start: 2023-03-01 | End: 2023-03-01

## 2023-03-01 RX ORDER — PIPERACILLIN SODIUM, TAZOBACTAM SODIUM 2; .25 G/10ML; G/10ML
2.25 INJECTION, POWDER, LYOPHILIZED, FOR SOLUTION INTRAVENOUS EVERY 6 HOURS
Status: DISCONTINUED | OUTPATIENT
Start: 2023-03-01 | End: 2023-03-01

## 2023-03-01 RX ORDER — DEXTROSE MONOHYDRATE 25 G/50ML
25-50 INJECTION, SOLUTION INTRAVENOUS
Status: DISCONTINUED | OUTPATIENT
Start: 2023-03-01 | End: 2023-03-01

## 2023-03-01 RX ORDER — SODIUM CHLORIDE, SODIUM GLUCONATE, SODIUM ACETATE, POTASSIUM CHLORIDE AND MAGNESIUM CHLORIDE 526; 502; 368; 37; 30 MG/100ML; MG/100ML; MG/100ML; MG/100ML; MG/100ML
INJECTION, SOLUTION INTRAVENOUS CONTINUOUS PRN
Status: DISCONTINUED | OUTPATIENT
Start: 2023-03-01 | End: 2023-03-01

## 2023-03-01 RX ORDER — CALCIUM CHLORIDE, MAGNESIUM CHLORIDE, DEXTROSE MONOHYDRATE, LACTIC ACID, SODIUM CHLORIDE, SODIUM BICARBONATE AND POTASSIUM CHLORIDE 5.15; 2.03; 22; 5.4; 6.46; 3.09; .157 G/L; G/L; G/L; G/L; G/L; G/L; G/L
INJECTION INTRAVENOUS CONTINUOUS
Status: DISCONTINUED | OUTPATIENT
Start: 2023-03-01 | End: 2023-03-02

## 2023-03-01 RX ORDER — SODIUM CHLORIDE 9 MG/ML
INJECTION, SOLUTION INTRAVENOUS CONTINUOUS PRN
Status: DISCONTINUED | OUTPATIENT
Start: 2023-03-01 | End: 2023-03-01

## 2023-03-01 RX ORDER — POTASSIUM CHLORIDE 29.8 MG/ML
20 INJECTION INTRAVENOUS EVERY 8 HOURS PRN
Status: DISCONTINUED | OUTPATIENT
Start: 2023-03-01 | End: 2023-03-02

## 2023-03-01 RX ORDER — FENTANYL CITRATE 50 UG/ML
INJECTION, SOLUTION INTRAMUSCULAR; INTRAVENOUS PRN
Status: DISCONTINUED | OUTPATIENT
Start: 2023-03-01 | End: 2023-03-01

## 2023-03-01 RX ORDER — NOREPINEPHRINE BITARTRATE 0.02 MG/ML
INJECTION, SOLUTION INTRAVENOUS CONTINUOUS PRN
Status: DISCONTINUED | OUTPATIENT
Start: 2023-03-01 | End: 2023-03-01

## 2023-03-01 RX ORDER — MAGNESIUM SULFATE HEPTAHYDRATE 40 MG/ML
2 INJECTION, SOLUTION INTRAVENOUS EVERY 8 HOURS PRN
Status: DISCONTINUED | OUTPATIENT
Start: 2023-03-01 | End: 2023-03-02

## 2023-03-01 RX ORDER — DEXTROSE MONOHYDRATE 25 G/50ML
25-50 INJECTION, SOLUTION INTRAVENOUS
Status: DISCONTINUED | OUTPATIENT
Start: 2023-03-01 | End: 2023-03-11 | Stop reason: DRUGHIGH

## 2023-03-01 RX ORDER — PIPERACILLIN SODIUM, TAZOBACTAM SODIUM 4; .5 G/20ML; G/20ML
4.5 INJECTION, POWDER, LYOPHILIZED, FOR SOLUTION INTRAVENOUS EVERY 6 HOURS
Status: COMPLETED | OUTPATIENT
Start: 2023-03-01 | End: 2023-03-02

## 2023-03-01 RX ORDER — CALCIUM GLUCONATE 20 MG/ML
2 INJECTION, SOLUTION INTRAVENOUS EVERY 8 HOURS PRN
Status: DISCONTINUED | OUTPATIENT
Start: 2023-03-01 | End: 2023-03-02

## 2023-03-01 RX ORDER — CALCIUM CHLORIDE, MAGNESIUM CHLORIDE, DEXTROSE MONOHYDRATE, LACTIC ACID, SODIUM CHLORIDE, SODIUM BICARBONATE AND POTASSIUM CHLORIDE 5.15; 2.03; 22; 5.4; 6.46; 3.09; .157 G/L; G/L; G/L; G/L; G/L; G/L; G/L
INJECTION INTRAVENOUS CONTINUOUS
Status: DISCONTINUED | OUTPATIENT
Start: 2023-03-01 | End: 2023-03-01 | Stop reason: HOSPADM

## 2023-03-01 RX ORDER — NALOXONE HYDROCHLORIDE 0.4 MG/ML
0.4 INJECTION, SOLUTION INTRAMUSCULAR; INTRAVENOUS; SUBCUTANEOUS
Status: DISCONTINUED | OUTPATIENT
Start: 2023-03-01 | End: 2023-03-19 | Stop reason: HOSPADM

## 2023-03-01 RX ORDER — NALOXONE HYDROCHLORIDE 0.4 MG/ML
0.2 INJECTION, SOLUTION INTRAMUSCULAR; INTRAVENOUS; SUBCUTANEOUS
Status: DISCONTINUED | OUTPATIENT
Start: 2023-03-01 | End: 2023-03-19 | Stop reason: HOSPADM

## 2023-03-01 RX ADMIN — FLUCONAZOLE IN SODIUM CHLORIDE 400 MG: 2 INJECTION, SOLUTION INTRAVENOUS at 05:34

## 2023-03-01 RX ADMIN — CALCIUM GLUCONATE 1 G: 20 INJECTION, SOLUTION INTRAVENOUS at 11:54

## 2023-03-01 RX ADMIN — PIPERACILLIN AND TAZOBACTAM 3.38 G: 3; .375 INJECTION, POWDER, LYOPHILIZED, FOR SOLUTION INTRAVENOUS at 02:25

## 2023-03-01 RX ADMIN — HUMAN INSULIN 4 UNITS/HR: 100 INJECTION, SOLUTION SUBCUTANEOUS at 20:38

## 2023-03-01 RX ADMIN — TACROLIMUS 2 MG: 5 CAPSULE ORAL at 07:58

## 2023-03-01 RX ADMIN — ALBUMIN HUMAN 25 G: 50 SOLUTION INTRAVENOUS at 04:59

## 2023-03-01 RX ADMIN — CALCIUM CHLORIDE, MAGNESIUM CHLORIDE, DEXTROSE MONOHYDRATE, LACTIC ACID, SODIUM CHLORIDE, SODIUM BICARBONATE AND POTASSIUM CHLORIDE 12.5 ML/KG/HR: 5.15; 2.03; 22; 5.4; 6.46; 3.09; .157 INJECTION INTRAVENOUS at 16:09

## 2023-03-01 RX ADMIN — SODIUM CHLORIDE, POTASSIUM CHLORIDE, SODIUM LACTATE AND CALCIUM CHLORIDE: 600; 310; 30; 20 INJECTION, SOLUTION INTRAVENOUS at 05:09

## 2023-03-01 RX ADMIN — VASOPRESSIN 2.4 UNITS/HR: 20 INJECTION, SOLUTION INTRAMUSCULAR; SUBCUTANEOUS at 07:32

## 2023-03-01 RX ADMIN — PROPOFOL 20 MCG/KG/MIN: 10 INJECTION, EMULSION INTRAVENOUS at 02:34

## 2023-03-01 RX ADMIN — CALCIUM CHLORIDE, MAGNESIUM CHLORIDE, DEXTROSE MONOHYDRATE, LACTIC ACID, SODIUM CHLORIDE, SODIUM BICARBONATE AND POTASSIUM CHLORIDE 12.5 ML/KG/HR: 5.15; 2.03; 22; 5.4; 6.46; 3.09; .157 INJECTION INTRAVENOUS at 17:38

## 2023-03-01 RX ADMIN — METHYLPREDNISOLONE SODIUM SUCCINATE 200 MG: 125 INJECTION, POWDER, LYOPHILIZED, FOR SOLUTION INTRAMUSCULAR; INTRAVENOUS at 07:54

## 2023-03-01 RX ADMIN — DEXTROSE MONOHYDRATE 75 ML/HR: 100 INJECTION, SOLUTION INTRAVENOUS at 12:12

## 2023-03-01 RX ADMIN — DEXTROSE MONOHYDRATE 25 G: 25 INJECTION, SOLUTION INTRAVENOUS at 11:54

## 2023-03-01 RX ADMIN — DEXTROSE AND SODIUM CHLORIDE: 5; 450 INJECTION, SOLUTION INTRAVENOUS at 16:57

## 2023-03-01 RX ADMIN — Medication 50 MG: at 12:12

## 2023-03-01 RX ADMIN — CALCIUM CHLORIDE, MAGNESIUM CHLORIDE, DEXTROSE MONOHYDRATE, LACTIC ACID, SODIUM CHLORIDE, SODIUM BICARBONATE AND POTASSIUM CHLORIDE: 5.15; 2.03; 22; 5.4; 6.46; 3.09; .157 INJECTION INTRAVENOUS at 16:09

## 2023-03-01 RX ADMIN — DEXTROSE MONOHYDRATE 25 ML: 25 INJECTION, SOLUTION INTRAVENOUS at 08:35

## 2023-03-01 RX ADMIN — PANTOPRAZOLE SODIUM 40 MG: 40 INJECTION, POWDER, FOR SOLUTION INTRAVENOUS at 19:42

## 2023-03-01 RX ADMIN — SODIUM CHLORIDE 20 MG: 9 INJECTION, SOLUTION INTRAVENOUS at 17:54

## 2023-03-01 RX ADMIN — PANTOPRAZOLE SODIUM 40 MG: 40 INJECTION, POWDER, FOR SOLUTION INTRAVENOUS at 07:59

## 2023-03-01 RX ADMIN — DEXTROSE MONOHYDRATE 300 ML: 100 INJECTION, SOLUTION INTRAVENOUS at 11:54

## 2023-03-01 RX ADMIN — PIPERACILLIN AND TAZOBACTAM 2.25 G: 2; .25 INJECTION, POWDER, FOR SOLUTION INTRAVENOUS at 14:07

## 2023-03-01 RX ADMIN — SODIUM CHLORIDE: 9 INJECTION, SOLUTION INTRAVENOUS at 12:12

## 2023-03-01 RX ADMIN — CALCIUM CHLORIDE, MAGNESIUM CHLORIDE, DEXTROSE MONOHYDRATE, LACTIC ACID, SODIUM CHLORIDE, SODIUM BICARBONATE AND POTASSIUM CHLORIDE 12.5 ML/KG/HR: 5.15; 2.03; 22; 5.4; 6.46; 3.09; .157 INJECTION INTRAVENOUS at 21:24

## 2023-03-01 RX ADMIN — CALCIUM CHLORIDE, MAGNESIUM CHLORIDE, DEXTROSE MONOHYDRATE, LACTIC ACID, SODIUM CHLORIDE, SODIUM BICARBONATE AND POTASSIUM CHLORIDE 12.5 ML/KG/HR: 5.15; 2.03; 22; 5.4; 6.46; 3.09; .157 INJECTION INTRAVENOUS at 22:54

## 2023-03-01 RX ADMIN — MIDAZOLAM 2 MG: 1 INJECTION INTRAMUSCULAR; INTRAVENOUS at 12:12

## 2023-03-01 RX ADMIN — FENTANYL CITRATE 50 MCG: 50 INJECTION, SOLUTION INTRAMUSCULAR; INTRAVENOUS at 15:01

## 2023-03-01 RX ADMIN — PIPERACILLIN SODIUM AND TAZOBACTAM SODIUM 4.5 G: 4; .5 INJECTION, POWDER, LYOPHILIZED, FOR SOLUTION INTRAVENOUS at 19:42

## 2023-03-01 RX ADMIN — HUMAN INSULIN 10 UNITS: 100 INJECTION, SOLUTION SUBCUTANEOUS at 11:56

## 2023-03-01 RX ADMIN — SODIUM CHLORIDE, POTASSIUM CHLORIDE, SODIUM LACTATE AND CALCIUM CHLORIDE 500 ML: 600; 310; 30; 20 INJECTION, SOLUTION INTRAVENOUS at 07:54

## 2023-03-01 RX ADMIN — PIPERACILLIN AND TAZOBACTAM 3.38 G: 3; .375 INJECTION, POWDER, LYOPHILIZED, FOR SOLUTION INTRAVENOUS at 07:59

## 2023-03-01 RX ADMIN — VALGANCICLOVIR HYDROCHLORIDE 450 MG: 50 POWDER, FOR SOLUTION ORAL at 07:58

## 2023-03-01 RX ADMIN — TACROLIMUS 2 MG: 5 CAPSULE ORAL at 17:33

## 2023-03-01 RX ADMIN — MAGNESIUM SULFATE IN WATER 2 G: 40 INJECTION, SOLUTION INTRAVENOUS at 11:41

## 2023-03-01 RX ADMIN — MYCOPHENOLATE MOFETIL 750 MG: 200 POWDER, FOR SUSPENSION ORAL at 17:33

## 2023-03-01 RX ADMIN — MYCOPHENOLATE MOFETIL 750 MG: 200 POWDER, FOR SUSPENSION ORAL at 07:58

## 2023-03-01 RX ADMIN — ALBUMIN HUMAN 25 G: 50 SOLUTION INTRAVENOUS at 17:32

## 2023-03-01 ASSESSMENT — ACTIVITIES OF DAILY LIVING (ADL)
ADLS_ACUITY_SCORE: 37
ADLS_ACUITY_SCORE: 31
ADLS_ACUITY_SCORE: 33
ADLS_ACUITY_SCORE: 31
ADLS_ACUITY_SCORE: 35
ADLS_ACUITY_SCORE: 37
ADLS_ACUITY_SCORE: 31

## 2023-03-01 NOTE — ANESTHESIA PREPROCEDURE EVALUATION
Anesthesia Pre-Procedure Evaluation    Patient: Dillon Salter   MRN: [id number] : 1995        Procedure : Procedure(s):  Return liver transplant          Past Medical History:   Diagnosis Date     Acute on chronic anemia 2022     Alcohol use disorder      Alcohol use disorder, severe, dependence (H) 2022     Alcoholic cirrhosis of liver with ascites (H)      Alcoholic hepatitis      Autism spectrum disorder 2022    High functioning autistic.  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  lives with parents (Leticia and Wes) socially isolated drinks alcohol beer daily      Benign essential hypertension      Bleeding esophageal varices (H) 2022     Hepatic encephalopathy      Hyponatremia 2022     Major depressive disorder      Type II Diabetes       Past Surgical History:   Procedure Laterality Date     BENCH LIVER  2023    Procedure: Bench liver;  Surgeon: Thelma Sterling MD;  Location: [facility] OR     COLONOSCOPY N/A 2023    Procedure: Colonoscopy;  Surgeon: Osman Montoya MD;  Location: [facility] OR     ENDOSCOPIC ULTRASOUND LOWER GASTROINTESTIONAL TRACT (GI) N/A 2023    Procedure: ULTRASOUND, LOWER GI TRACT, ENDOSCOPIC with glueing;  Surgeon: Osman Montoya MD;  Location: [facility] OR     ESOPHAGOSCOPY, GASTROSCOPY, DUODENOSCOPY (EGD), COMBINED N/A 2022    Procedure: ESOPHAGOGASTRODUODENOSCOPY (EGD) with esophageal banding;  Surgeon: Gary Hurst MD;  Location: [facility] Main OR     ESOPHAGOSCOPY, GASTROSCOPY, DUODENOSCOPY (EGD), COMBINED N/A 2022    Procedure: ESOPHAGOGASTRODUODENOSCOPY;  Surgeon: Gavino Reza MD;  [facility]     ESOPHAGOSCOPY, GASTROSCOPY, DUODENOSCOPY (EGD), COMBINED N/A 2023    Procedure: ESOPHAGOGASTRODUODENOSCOPY (EGD) with esophageal variceal banding;  Surgeon: Juan Boo  MD Braulio;  Location: [facility] Main OR     ESOPHAGOSCOPY, GASTROSCOPY, DUODENOSCOPY (EGD), COMBINED N/A 2023    Procedure: ESOPHAGOGASTRODUODENOSCOPY (EGD);  Surgeon: Juan Boo MD;  Location: [facility] Main OR     ESOPHAGOSCOPY, GASTROSCOPY, DUODENOSCOPY (EGD), COMBINED N/A 2023    Procedure: Esophagoscopy, gastroscopy, duodenoscopy (EGD), combined;  Surgeon: Osman Montoya MD;  Location: [facility] OR     ESOPHAGOSCOPY, GASTROSCOPY, DUODENOSCOPY (EGD), COMBINED N/A 2023    Procedure: ESOPHAGOGASTRODUODENOSCOPY (EGD);  Surgeon: Leventhal, Thomas Michael, MD;  Location: [facility] OR     MIDLINE DOUBLE LUMEN PLACEMENT  2022          PICC TRIPLE LUMEN PLACEMENT  2022          TRANSPLANT LIVER RECIPIENT  DONOR N/A 2023    Procedure: Transplant liver recipient  donor;  Surgeon: Thelma Sterling MD;  Location: [facility] OR      No Known Allergies   Social History     Tobacco Use     Smoking status: Former     Smokeless tobacco: Never     Tobacco comments:     A pack lasted a year, hardly smoked   Substance Use Topics     Alcohol use: Not Currently     Comment: No ETOH since 22      Wt Readings from Last 1 Encounters:   23 80.5 kg (177 lb 7.5 oz)        Anesthesia Evaluation   Pt has had prior anesthetic. Type: General.        ROS/MED HX  ENT/Pulmonary: Comment: Intubated, minimal ventilatory support.      Neurologic: Comment: Sedated, opens his eyes to painful stimulation. Not following commands. History of hepatic encephalopathy.      Cardiovascular: Comment: Off vasoactive gtt at the time of this evaluation.    (+) hypertension-----    METS/Exercise Tolerance:     Hematologic: Comments: Significant blood products requirement overnight.  2U PRBC, 2 U FFP, 2U Cryo, 2 Liters LR, 500 ml Albumin. This morning received 3U PRBC, 1U FFP.      Musculoskeletal:       GI/Hepatic:     (+) hepatitis type Alcoholic, liver disease,     Renal/Genitourinary: Comment: Acute Kidney Injury,  anuric since last night.  Will start CRRT after surgery today. Dialysis catheter in place Right groin. Electrolytes results pending.     (+) renal disease, type: ARF,     Endo: Comment: Insulin gtt     (+) type I DM,     Psychiatric/Substance Use:     (+) psychiatric history other (comment) (Asperger's syndrome) alcohol abuse     Infectious Disease:  - neg infectious disease ROS     Malignancy:  - neg malignancy ROS     Other:            Physical Exam    Airway  airway exam normal           Respiratory Devices and Support         Dental    unable to assess        Cardiovascular   cardiovascular exam normal          Pulmonary           (+) decreased breath sounds           OUTSIDE LABS:  CBC:   Lab Results   Component Value Date    WBC 7.1 03/01/2023    WBC 8.9 03/01/2023    HGB 5.1 (LL) 03/01/2023    HGB 8.3 (L) 03/01/2023    HCT 14.2 (L) 03/01/2023    HCT 23.2 (L) 03/01/2023    PLT 68 (L) 03/01/2023    PLT 86 (L) 03/01/2023     BMP:   Lab Results   Component Value Date     03/01/2023     03/01/2023    POTASSIUM 5.7 (H) 03/01/2023    POTASSIUM 5.6 (H) 03/01/2023    CHLORIDE 103 03/01/2023    CHLORIDE 102 03/01/2023    CO2 25 03/01/2023    CO2 25 03/01/2023    BUN 60.2 (H) 03/01/2023    BUN 62.0 (H) 03/01/2023    CR 1.67 (H) 03/01/2023    CR 1.63 (H) 03/01/2023     (H) 03/01/2023     (H) 03/01/2023     COAGS:   Lab Results   Component Value Date    PTT 40 (H) 03/01/2023    INR 2.32 (H) 03/01/2023    FIBR 171 03/01/2023     POC: No results found for: BGM, HCG, HCGS  HEPATIC:   Lab Results   Component Value Date    ALBUMIN 2.8 (L) 03/01/2023    PROTTOTAL 3.9 (L) 03/01/2023     (H) 03/01/2023     (H) 03/01/2023    ALKPHOS 43 03/01/2023    BILITOTAL 4.9 (H) 03/01/2023    DESI 49 02/22/2023     OTHER:   Lab Results   Component Value Date    PH 7.37 03/01/2023    LACT 5.6 (HH) 03/01/2023    A1C 6.1 (H) 02/28/2023    SARTHAK 9.4 03/01/2023    PHOS 6.7 (H) 03/01/2023    MAG 2.1 03/01/2023     LIPASE 19 03/01/2023    AMYLASE 62 03/01/2023    TSH 1.09 07/30/2022       Anesthesia Plan    ASA Status:  4, emergent    NPO Status:  NPO Appropriate    Anesthesia Type: General.   Induction: Inhalation.   Maintenance: Balanced.   Techniques and Equipment:     - Lines/Monitors: 2nd IV, Arterial Line, Central Line     - Blood: Blood in Room     Consents         - Extended Intubation/Ventilatory Support Discussed: Yes.      - Patient is DNR/DNI Status: No    Use of blood products discussed: Yes.     - Consented: consented to blood products            Reason for refusal: other.     Postoperative Care    Pain management: Multi-modal analgesia.   PONV prophylaxis: Ondansetron (or other 5HT-3)     Comments:              PAC Discussion and Assessment    ASA Classification: 4    Anesthetic techniques and relevant risks discussed: GA  Invasive monitoring and risk discussed: Yes    Possibility and Risk of blood transfusion discussed: Yes  NPO instructions given: NPO after midnight    Needs early admission to pre-op area: No                  Attending Anesthesiologist Anesthesia Assessment: A-line, CVL, PA Catheter in place  Lab results 10 am today: K 6.2. Shifted with 10 U Insulin/D50. Will start CRRT in the OR                           Marilin Lindsey MD

## 2023-03-01 NOTE — ANESTHESIA CARE TRANSFER NOTE
Patient: Dillon Salter    Procedure: Procedure(s):  RETURN TO OPERATING ROOM, AFTER LIVER TRANSPLANT       Diagnosis: Bleeding [R58]  Diagnosis Additional Information: No value filed.    Anesthesia Type:   General     Note:    Oropharynx: endotracheal tube in place, bite block in place and ventilatory support  Level of Consciousness: iatrogenic sedation      Independent Airway: airway patency satisfactory and stable  Dentition: dentition unchanged  Vital Signs Stable: post-procedure vital signs reviewed and stable  Report to RN Given: handoff report given  Patient transferred to: ICU  Comments: o2 via ambu and monitor for transport to . Placed on ventilator on arrival. Tolerated anesthesia well  ICU Handoff: Call for PAUSE to initiate/utilize ICU HANDOFF, Identified Patient, Identified Responsible Provider, Reviewed the Pertinent Medical History, Discussed Surgical Course, Reviewed Intra-OP Anesthesia Management and Issues during Anesthesia, Set Expectations for Post Procedure Period and Allowed Opportunity for Questions and Acknowledgement of Understanding      Vitals:  Vitals Value Taken Time   BP     Temp 34.6  C (94.28  F) 03/01/23 1549   Pulse 65 03/01/23 1549   Resp     SpO2 100 % 03/01/23 1549   Vitals shown include unvalidated device data.    Electronically Signed By: VAMSHI Arnett CRNA  March 1, 2023  3:51 PM

## 2023-03-01 NOTE — PROGRESS NOTES
CLINICAL NUTRITION SERVICES - BRIEF NOTE   (See RD note on 2/24 for full assessment)     Reason for RD note: Provider order for TF assess and order - auto consult s/p DDLT    New Findings/Chart Review:  Pt is POD 1 DDLT.  Ongoing resuscitation with plans for RTOR today, CRRT per nephrology    Future/Additional Recommendations:  If FT planned, recommend: Novasource Renal @ 45 ml/hr (1080 ml) + 1 pkt Prosource TF20 provides 2240 kcal (34 Kcal/kg), 118 g pro (1.8 gm/kg), 198 g CHO, 774 ml free water, and 0 g fiber daily.    Nutrition will continue to follow per protocol.    Caren Cheatham, RD, LD, CNSC  4A SICU RD pager: [phone]  Ascom: [phone]  Weekend/Holiday RD pager [phone]

## 2023-03-01 NOTE — PLAN OF CARE
Shift:    1900 -0730    Patient in ICU for post liver transplant.     Events:     Given 2 L of LR bolus. RBC given x 2 units. FFP given x 2 units. Cryo given x 2 units. Albumin 500 mL given for low BP. Labs trends followed.  Remains sedated.  Not withdrawing from pain, sedation decreased, now starting to move BLEs.  Abd xray to confirm OG placement and ok for use done.   Labs drawn per orders. Hourly rounded and call light in place. Delirium interventions in place. Patient turned every 2-4 hours and PRN. (See flow sheets for additional data). (Labs reviewed).    Assessment:     Neuro: Pt is sedated. Moving BLEs spontaneously. Not withdrawing from painful stimuli on all 4 extremities.     CV: HR is 70s, rhythm is SR. BP is supported with levo. Pulses are palpated but weak.  MAP > 65.  Levo titrated for MAP goal.    Pulm:  Lungs are clear. Vent settings are: AC 14 / PEEP 5/ tv 400 / 40 %. Scant ETT and oral secretions.    : Negron in place. UOP is >5 / hr. UOP has decreased past few hours. MD is aware.      GI: Abd is soft. OG in place. Meds given in OG.     Skin: Tear to buttocks. JUANA x 2 to abdomen. JUANA on the left has dark  Red/blood output. Edema noted to BLE.     BP 95/51 (BP Location: Right arm)   Pulse 79   Temp 98.2  F (36.8  C)   Resp 14   Wt 80.5 kg (177 lb 7.5 oz)   SpO2 100%   BMI 29.53 kg/m        Drips:    LR -  100    Levophed - 0.16  Propofol - 15  Fentanyl - 50  Insulin - 1.5 units (algorthim # 3)        Lines:    CVC right internal jugular x 2 (MAC)  Maurepas in place - 51 cm  Arterial left radial  PIV x 3  ETT  at 23  OG at 55 cm      Ryder Camarena RN on 3/1/2023 at 5:47 AM

## 2023-03-01 NOTE — PROGRESS NOTES
CRRT INITIATION NOTE    Consent for CRRT Completed:  YES  Patient s Vascular Access: Catheter              Placement Confirmed: YES  Manufacture:  [id number]  Model:  Hebert  Length/Wallisian Size:  12 Fr x 16 cm  Flush Volume:  1.6/1.6    DATA:  Procedure:  Intraoperative initiation   Start Time:  1253  Machine#:  4  Filter:    Blood Flow:  200  ML/min  Pre-Replacement Solution:  PrismaSol BGK 2/3.5  Post-Replacement Solution:  PrismaSol BGK 2/3.5  Dialysate Solution:  PrismaSol BGK 2/3.5  Pre-Replacement Solution Rate:  1600 mL/hr  Post-Replacement Solution Rate:  200 mL/hr  Dialysate Flow Rate:  1600 mL/hr   Patient Removal Rate:  0 mL/hr  Anticoagulation Type and Rate:  NA    ASSESSMENT:  How Patient Tolerated Initiation: Well  Vital Signs:  See anesthesia vital record  Initial Pressures:  Access:  -50  Filter:  128  Return:  60  TMP:  24  Change in Filter Pressure:  33      INTERVENTIONS:  Intraoperative CRRT set up and ran throughout case. CRRT started back up once returned to floor.     PLAN:  Intraoperative CRRT, then return to floor and continue CRRT w/ goal of I=O and changing Pre Replacement and Dialysate rate to 1000 mL/hr.

## 2023-03-01 NOTE — CONSULTS
[facility]  Transplant Nephrology Consult  Date of Admission:  2/11/2023  Today's Date: 03/01/2023  Requesting physician: Thelma Sterling MD    Recommendations:  -Recommend intra op CRRT followed by ongoing CRRT I=O after returning to the floor given hyperK, lactic acidosis, anemia requiring multiple blood transfusions which serve to increase K further.   -Shift patient again until CRRT is started and in the OR as well as on floor orders will increase flow rates to 40ml/kg/hr with 2K bath.   -Post operatively when serum K is <5.5 I can decrease CRRT dose to 25ml/kg/hr    Assessment & Plan   # ASHISH: anuric due to hemorrhagic shock, ATN on top of HRS that was present pre txp   - Baseline Creatinine: ~ 0.9   - Proteinuria: Normal (<0.2 grams)   - Kidney Biopsy: No    -Pt developed ASHISH with Scr 1.88 on 1/23, improved to 1.22 on 2/27, increased to 1.71 on 2/28, day of transplant, likely 2/2 HRS. Now with anuric ASHISH 2/2 ATN from hypoperfusion due to hemorrhagic shock in the face of pre existing HRS.    -Appreciate placement of dialysis catheter by ICU team.    -Consent obtained from patient's mother on 3/1  - CRRT Info  Access: Temporary Catheter fem; Blood Flow Rate: 200 ml/min; Net Fluid Removal Goal: I=O  Prescription -  Dialysate: 20 ml/kg/hr with K2 bath, Pre: 20 with K2 bath, Post: 200 ml/hr with K2 bath      # Liver Tx: Downtrending LFTs but patient is bleeding, hypotensive. Plan for OR takeback today for washout    # Immunosuppression: Tacrolimus immediate release (goal 5-8), Mycophenolate mofetil (dose 750 mg every 12 hours) and Methylprednisolone (dose taper)   - Changes: Not at this time. Per transplant surgery team    # Infection Prophylaxis:   - PJP: Sulfa/TMP (Bactrim), holding off starting 2/2 hyperK  - CMV: Valganciclovir (Valcyte)  - Fungal: Micafungin (Mycamine)    # Blood Pressure: Hypotensive;  Goal BP: MAP > 65   - Volume status: Total body volume up, but  intravascularly hypovolemic  EDW ~ 70kg   - Changes: Yes - continue pressors for MAP goal >65    # Diabetes: Controlled (HbA1c <7%) Last HbA1c: 6.1%   - Management as per primary team.    # Anemia of acute blood loss: Hgb: Trend down      MARISEL: No   - Iron studies: Not checked recently    -Transfusion for Hb<7.    -Pt going for RTOR today due to ongoing bleeding and hemorrhagic shock    # Mineral Bone Disorder:   - Calcium; level: Normal        On supplement: No  - Phosphorus; level: High        On binder: No, modulate with CRRT    # Electrolytes:   - Potassium; level: High        On supplement: No  - Magnesium; level: Normal        On supplement: No  - Bicarbonate; level: Normal        On supplement: No    # Hyperkalemia:    -K up to 6.2. Recommend shifting now and start on CRRT with 2K bath, 40ml/kg/hr dose both intra op and post op.    -Once K is <5.5 post op will decrease CRRT to 25ml/kg/hr dose    # Lactic acidosis:   -2/2 hypoperfusion due to hemorrhagic shock   -CRRT as above as well as RTOR to stop bleeding    # Transplant History:  Tx: Liver Tx  Transplant: 2/28/2023 (Liver)  Significant changes in immunosuppression: None  Significant transplant-related complications: None    Recommendations were communicated to the primary team verbally.      Anderson Jones MD  Pager: [phone]    REASON FOR CONSULT   Hyperkalemia, ASHISH after liver transplant    History of Present Illness   Dillon Salter is a 27-year-old male with autism spectrum disorder, type 2 diabetes, alcoholic cirrhosis with esophageal varices complicated by bleeding in 5/2022 and 1/2023), rectal varices status post sclerotherapy 1/2023, patient presented initially with hematemesis with EGD performed 2/11/2023 showing portal hypertensive gastropathy, baseline serum creatinine 0.9 but patient developed ASHISH with a creatinine up to 1.88 on 2/23 that improved to 1.22 on 2/27 but had another episode of ASHISH with creatinine up to 1.71 on 2/20 8/2023 which was  the date of his liver transplant, now postop with hemodynamic instability, lactic acidosis, hemorrhagic shock requiring multiple PRBCs and blood products for whom transplant nephrology was consulted for anuric ASHISH.    Yesterday the patient produced 1.7 L of urine and since midnight he is only produced 110 cc of urine, currently is anuric.  The patient was shifted earlier this morning for a potassium of 5.7 but on recheck the patient's potassium was 6.2.  The plan is to take the patient to the operating room.  He also has lactic acidosis with a lactate up to 6.4.  He remains on Levophed and vasopressin.  I called the patient's mother to update her on the situation and consent her for renal replacement therapy.  The plan will be to shift the patient again, start Intra-Op CRRT with total dose of 40 mL/kg/h with a 2K bath and continue this postop until potassium is less than 5.5 at which time we can reduce the dose to 25 mL/kg/h.  All history was obtained from the chart as well as the primary team as the patient is currently intubated and sedated.      Review of Systems    Review of systems not obtained due to patient factors - intubation and sedation    Past Medical History    I have reviewed this patient's medical history and updated it with pertinent information if needed.   Past Medical History:   Diagnosis Date     Acute on chronic anemia 04/08/2022     Alcohol use disorder      Alcohol use disorder, severe, dependence (H) 07/11/2022     Alcoholic cirrhosis of liver with ascites (H)      Alcoholic hepatitis      Autism spectrum disorder 04/08/2022    High functioning autistic.  26-year-old history of autism/Asperger's on the spectrum graduated high school at [employer] worked at [employer] and then another  place until the Covid epidemic in 2020 lives with parents (Leticia and Wes) socially isolated drinks alcohol beer daily      Benign essential hypertension      Bleeding esophageal varices (H) 06/18/2022      Hepatic encephalopathy      Hyponatremia 07/28/2022     Major depressive disorder      Type II Diabetes        Past Surgical History   I have reviewed this patient's surgical history and updated it with pertinent information if needed.  Past Surgical History:   Procedure Laterality Date     BENCH LIVER  2/28/2023    Procedure: Bench liver;  Surgeon: Thelma Sterling MD;  Location: [facility] OR     COLONOSCOPY N/A 1/27/2023    Procedure: Colonoscopy;  Surgeon: Osman Montoya MD;  Location: [facility] OR     ENDOSCOPIC ULTRASOUND LOWER GASTROINTESTIONAL TRACT (GI) N/A 1/27/2023    Procedure: ULTRASOUND, LOWER GI TRACT, ENDOSCOPIC with glueing;  Surgeon: Osman Montoya MD;  Location: [facility] OR     ESOPHAGOSCOPY, GASTROSCOPY, DUODENOSCOPY (EGD), COMBINED N/A 5/24/2022    Procedure: ESOPHAGOGASTRODUODENOSCOPY (EGD) with esophageal banding;  Surgeon: Gary Hurst MD;  Location: [facility] Main OR     ESOPHAGOSCOPY, GASTROSCOPY, DUODENOSCOPY (EGD), COMBINED N/A 6/20/2022    Procedure: ESOPHAGOGASTRODUODENOSCOPY;  Surgeon: Gavino Reza MD;  Location: [facility] Main OR     ESOPHAGOSCOPY, GASTROSCOPY, DUODENOSCOPY (EGD), COMBINED N/A 1/23/2023    Procedure: ESOPHAGOGASTRODUODENOSCOPY (EGD) with esophageal variceal banding;  Surgeon: Juan Boo MD;  Location: [facility] Main OR     ESOPHAGOSCOPY, GASTROSCOPY, DUODENOSCOPY (EGD), COMBINED N/A 1/25/2023    Procedure: ESOPHAGOGASTRODUODENOSCOPY (EGD);  Surgeon: Juan Boo MD;  Location: [facility] Main OR     ESOPHAGOSCOPY, GASTROSCOPY, DUODENOSCOPY (EGD), COMBINED N/A 1/27/2023    Procedure: Esophagoscopy, gastroscopy, duodenoscopy (EGD), combined;  Surgeon: Osman Montoya MD;  Location: [facility] OR     ESOPHAGOSCOPY, GASTROSCOPY, DUODENOSCOPY (EGD), COMBINED N/A 2/12/2023    Procedure: ESOPHAGOGASTRODUODENOSCOPY (EGD);  Surgeon: Leventhal, Thomas Michael, MD;  Location: [facility] OR     MIDLINE DOUBLE LUMEN PLACEMENT  5/26/2022          PICC TRIPLE LUMEN PLACEMENT   2022          TRANSPLANT LIVER RECIPIENT  DONOR N/A 2023    Procedure: Transplant liver recipient  donor;  Surgeon: Thelma Sterling MD;  Location:  OR       Family History   I have reviewed this patient's family history and updated it with pertinent information if needed.   Family History   Problem Relation Age of Onset     Hypertension Mother      Substance Abuse Mother      Thyroid Disease Mother      Heart Failure Father      Substance Abuse Father      Chronic Obstructive Pulmonary Disease Father      Substance Abuse Maternal Grandfather        Social History   I have reviewed this patient's social history and updated it with pertinent information if needed. Dillon Salter  reports that he has quit smoking. He has never used smokeless tobacco. He reports that he does not currently use alcohol. He reports that he does not currently use drugs after having used the following drugs: Marijuana.    Allergies   No Known Allergies  Prior to Admission Medications     basiliximab (SIMULECT) infusion  20 mg Intravenous Once     [START ON 3/5/2023] basiliximab (SIMULECT) infusion  20 mg Intravenous Once     calcium gluconate  1 g Intravenous Once     sodium chloride (PF) 0.9%  10 mL Intracatheter Once in dialysis/CRRT    Followed by     heparin  1.3-2.6 mL Intracatheter Once in dialysis/CRRT     sodium chloride (PF) 0.9%  10 mL Intracatheter Once in dialysis/CRRT    Followed by     heparin  1.3-2.6 mL Intracatheter Once in dialysis/CRRT     [START ON 3/2/2023] methylPREDNISolone  100 mg Intravenous Once    Followed by     [START ON 3/3/2023] methylPREDNISolone  50 mg Intravenous Once    Followed by     [START ON 3/4/2023] predniSONE  25 mg Oral Once    Followed by     [START ON 3/5/2023] predniSONE  10 mg Oral Once     [START ON 3/2/2023] micafungin  100 mg Intravenous Q24H     mycophenolate  750 mg Oral BID IS    Or     mycophenolate  750 mg Oral or NG Tube BID IS     pantoprazole  40 mg Per  Feeding Tube BID    Or     pantoprazole  40 mg Intravenous BID     piperacillin-tazobactam  2.25 g Intravenous Q6H     sodium chloride (PF)  3 mL Intravenous Q8H     tacrolimus  2 mg Oral BID IS    Or     tacrolimus  2 mg Oral or NG Tube BID IS     valGANciclovir  450 mg Oral Daily    Or     valGANciclovir  450 mg Oral or NG Tube Daily       dextrose       dextrose 5% and 0.45% NaCl Stopped (23 1739)     CRRT replacement solution       fentaNYL 100 mcg/hr (23 1000)     insulin regular 0.5 Units/hr (23 1000)     lactated ringers 100 mL/hr at 23 1000     propofol 5 mcg/kg/min (23 1000)    And     - MEDICATION INSTRUCTIONS -       IV fluid REPLACEMENT ONLY       - MEDICATION INSTRUCTIONS -       norepinephrine 0.09 mcg/kg/min (23 1134)     CRRT replacement solution       CRRT replacement solution       BETA BLOCKER NOT PRESCRIBED       vasopressin Stopped (23 0854)       Physical Exam   Temp  Av.9  F (36.6  C)  Min: 95.5  F (35.3  C)  Max: 99.5  F (37.5  C)  Arterial Line BP  Min: 78/56  Max: 126/60  Arterial Line MAP (mmHg)  Av.4 mmHg  Min: 56 mmHg  Max: 89 mmHg      Pulse  Av.9  Min: 67  Max: 111 Resp  Av.7  Min: 10  Max: 34  FiO2 (%)  Av.7 %  Min: 30 %  Max: 50 %  SpO2  Av.4 %  Min: 84 %  Max: 100 %    CVP (mmHg): 8 mmHgBP 95/51 (BP Location: Right arm)   Pulse 90   Temp (!) 95.7  F (35.4  C)   Resp 16   Wt 80.5 kg (177 lb 7.5 oz)   SpO2 98%   BMI 29.53 kg/m     Date 23 0700 - 23 0659   Shift 0882-6119 5698-5482 4016-5291 24 Hour Total   INTAKE   I.V. 737.04   737.04   IV Piggyback 500   500   Blood Components 948   948   Shift Total(mL/kg) 2185.04(27.14)   2185.04(27.14)   OUTPUT   Urine 3   3   Emesis/NG output 100   100   Drains 10   10   Shift Total(mL/kg) 113(1.4)   113(1.4)   Weight (kg) 80.5 80.5 80.5 80.5      Admit Weight: 65.9 kg (145 lb 4.5 oz)     GENERAL APPEARANCE: intubated sedated  HENT: mouth without ulcers or  lesions  LYMPHATICS: no cervical or supraclavicular nodes  CV: regular rhythm, normal rate, no rub, no murmur  EDEMA: 2+ LE edema bilaterally  ABDOMEN: soft, nondistended, nontender, bowel sounds normal  MS: extremities normal - no gross deformities noted, no evidence of inflammation in joints, no muscle tenderness  SKIN: no rash  NEURO: no focal deficits, sedated  DIALYSIS ACCESS: none    Data   CMP  Recent Labs   Lab 03/01/23  1119 03/01/23  1038 03/01/23  1035 03/01/23  0859 03/01/23  0740 03/01/23  0609 03/01/23  0608 03/01/23  0411 02/28/23 2127 02/28/23 2122   NA  --   --  139  --   --  142  --  138  --  139   POTASSIUM  --   --  6.2*  --   --  5.7*  --  5.6*  --  5.2   CHLORIDE  --   --  101  --   --  103  --  102  --  102   CO2  --   --  24  --   --  25  --  25  --  26   ANIONGAP  --   --  14  --   --  14  --  11  --  11   * 160* 158* 167*   < > 114*   < > 110*  128*   < > 114*   BUN  --   --  63.3*  --   --  60.2*  --  62.0*  --  57.9*   CR  --   --  1.77*  --   --  1.67*  --  1.63*  --  1.39*   GFRESTIMATED  --   --  53*  --   --  56*  --  58*  --  70   SARTHAK  --   --  8.9  --   --  9.4  --  8.9  --  9.8   MAG  --   --  1.7  --   --  2.1  --  2.0  --  1.7   PHOS  --   --  8.3*  --   --  6.7*  --  6.5*  --  4.7*   PROTTOTAL  --   --  3.3*  --   --  3.9*  --  3.2*  --  3.0*   ALBUMIN  --   --  2.4*  --   --  2.8*  --  1.9*  --  1.9*   BILITOTAL  --   --  5.2*  --   --  4.9*  --  5.4*  --  8.9*   ALKPHOS  --   --  37*  --   --  43  --  53  --  60   AST  --   --  500*  --   --  347*  --  410*  --  569*   ALT  --   --  402*  --   --  268*  --  306*  --  190*    < > = values in this interval not displayed.     CBC  Recent Labs   Lab 03/01/23  0609 03/01/23  0411 03/01/23  0212 02/28/23 2122 02/28/23  1850   HGB 5.1* 8.3* 10.2* 7.8* 9.6*   WBC 7.1 8.9  --  7.6 5.4   RBC 1.63* 2.69*  --  2.59* 3.18*   HCT 14.2* 23.2*  --  22.3* 27.6*   MCV 87 86  --  86 87   MCH 31.3 30.9  --  30.1 30.2   MCHC 35.9 35.8   --  35.0 34.8   RDW 16.8* 16.7*  --  15.4* 15.0   PLT 68* 86*  --  114* 93*     INR  Recent Labs   Lab 03/01/23  1035 03/01/23  0744 03/01/23  0609 03/01/23  0411 03/01/23  0212 02/28/23  2122 02/28/23  1850 02/28/23  1703   INR  --  2.32* 2.06* 1.79* 2.15* 1.91*   < > 1.86*   PTT 40*  --   --  34  --  36  --  48*    < > = values in this interval not displayed.     ABG  Recent Labs   Lab 03/01/23  0856 03/01/23  0744 02/28/23  1936 02/28/23  1856 02/28/23  1702   PH 7.37 7.61*  --  7.56* 7.51*   PCO2 40 22*  --  31* 40   PO2 114* 202*  --  175* 137*   HCO3 23 22  --  28 32*   O2PER 30 40 40 40 50      Urine Studies  Recent Labs   Lab Test 02/27/23  1616 02/24/23  1818 02/22/23  1421 02/19/23  2051   COLOR Yellow Light Yellow Yellow Yellow   APPEARANCE Clear Clear Clear Clear   URINEGLC Negative Negative Negative Negative   URINEBILI Negative Negative Negative Negative   URINEKETONE Negative Negative Negative Negative   SG 1.012 1.009 1.010 1.015   UBLD Negative Negative Negative Negative   URINEPH 5.5 5.0 5.0 5.0   PROTEIN 30* Negative Negative 30*   NITRITE Negative Negative Negative Negative   LEUKEST Negative Negative Negative Negative   RBCU <1 <1 1 26*   WBCU 2 1 2 16*     No lab results found.  PTH  No lab results found.  Iron Studies  Recent Labs   Lab Test 02/22/23  0610 02/20/23  0730 02/16/23  0543 12/20/22  0703 10/06/22  0958 04/07/22  0624   IRON 26*  --   --  249*  --  85   *  --   --   --   --   --    IRONSAT 19  --   --   --   --   --    ASCENCION 1,465* 1,335* 1,725* 2,207* 2,042* 423*       IMAGING:  All imaging studies reviewed by me.

## 2023-03-01 NOTE — PHARMACY-TRANSPLANT NOTE
Adult Liver Transplant Post Operative Note    27 year old male s/p  donor liver transplant on 23 for Alcoholic liver disease.      Planned immunosuppression regimen to include:   INDUCTION with: basiliximab on POD #0 and POD #4 due to poor kidney function which is defined as hemodialysis, SCr > 4 mg/dL or UO < 30 cc/hr and methylprednisolone/prednisone taper per liver transplant protocol.  MAINTENANCE to include mycophenolate and tacrolimus with initial goal trough levels of 5-10 mcg/L for 0-3 months post-transplant for patients with poor kidney function (hemodialysis, GFR < 30, or UO < 30 cc/hr).  Patient may continue prednisone at 5 mg PO daily until tacrolimus level is therapeutic.     Surgical prophylaxis includes: piperacillin-tazobactam IV for 48 hours and micafungin IV for 14 days.      Opportunistic pathogen prophylaxis includes: trimethoprim/sulfamethoxazole (not yet ordered), valganciclovir, and topical antifungal coverage to begin once systemic antifungal therapy is complete.    Patient is not enrolled in medication study.    Pharmacy will monitor for medication interactions and immunosuppression levels in conjunction with the team. Medication therapy needs for discharge planning will continue to be addressed throughout the current admission via multidisciplinary rounds and order review.  Pharmacy will make recommendations as appropriate.

## 2023-03-01 NOTE — ANESTHESIA POSTPROCEDURE EVALUATION
Patient: Dillon Salter    Procedure: Procedure(s):  RETURN TO OPERATING ROOM, AFTER LIVER TRANSPLANT       Anesthesia Type:  General    Note:  Disposition: Inpatient; ICU            ICU Sign Out: Anesthesiologist/ICU physician sign out WAS performed   Postop Pain Control: Uneventful            Sign Out: Well controlled pain   PONV: No   Neuro/Psych: Uneventful            Sign Out: PLANNED postop sedation   Airway/Respiratory: Uneventful            Sign Out: AIRWAY IN SITU/Resp. Support               Airway in situ/Resp. Support: ETT                 Reason: Planned Pre-op   CV/Hemodynamics: Uneventful            Sign Out: Acceptable CV status; No obvious hypovolemia; No obvious fluid overload   Other NRE: NONE   DID A NON-ROUTINE EVENT OCCUR? No           Last vitals:  Vitals Value Taken Time   BP     Temp 34.3  C (93.74  F) 03/01/23 1614   Pulse 63 03/01/23 1614   Resp     SpO2 100 % 03/01/23 1614   Vitals shown include unvalidated device data.    Electronically Signed By: Jaleel Sharma MD  March 1, 2023  4:16 PM

## 2023-03-01 NOTE — OP NOTE
Transplant Surgery  Operative Note    Preop Dx:  S/p liver transplant, concern for intra-abdominal bleeding  Postop Dx: intra-abdominal hemorrhage  Procedure: return to OR, washout, repair of bleeder, closure  Surgeon: Romeo Acevedo MD  ASSISTANT:  Thelma Sterling MD- co-surgeon. Dr. Sterling scrubbed to assess the field and the graft as he was the transplanting surgeon for the index operation.   Jersonjessie Valdez MD- fellow. Dr. Valdez was the primary assistant for the procedure and assisted with washout and closure.. There was no qualified general surgery resident available to assist during this procedure.   Anesthesia: General  EBL: 3000 ml (predominantly old blood)  Fluids: 3uPRBC, 2u FFP, 1u plt  UO: CRRT  Drains: Drake-Kwok drain X2 maintained from prior surgery  Specimen: none.  Complications: None  Findings:  Large amount of intra-abdominal blood and clot. Arterial bleeder found in RUQ along diaphragm- suture repaired. Prior retroperitoneal bleeding areas appeared hemostatic- dressed with fresh SNoW. JUANA drains replaced and abdomen closed  Complications: None.    Indication: The patient has bleeding following liver transplant  After discussing the risks and benefits of surgery and potential complications, the patient provided informed consent.     DETAILS OF PROCEDURE:  The patient was brought to the operating room, placed in a supine position.  Perioperative prophylactic IV antibiotics were given.  Anesthesia was adminisitered. The abdomen was prepped and draped in the usual sterile fashion.  Time out was performed.    We reopened the laparotomy and evacuated large amounts of old and fresh blood and clot. We assembled the retractor. We irrigated the field until it was clear.    On assessment, I noted an arterial bleed from the diaphram. I oversewed this with 4-0 prolene sutures X2 until it was hemostatic. I then packed this area. I assessed the retroperitoneal/retrocaval area that had been a  significant source of bleeding previously and did not note any bleeding. I irrigated it and packed it with fresh hemostatic agent.    I irrigated the remainder of the abdomen. The artery, portal vein, and bile duct looked good.The liver was soft and not engorged. Both caval anastomoses felt patent and did not have bleeding. I called Dr. Sterling to assess the graft. He concurred that the graft looked and felt normal. We assessed flows and determined an arterial flow ~160mL/min and PV flow ~800mL/min. These were felt to be appropriate given his status on pressors and small size of liver graft. We did not see evidence of bile leak.     We reassessed the retroperitoneum and the diaphragm and did not see evidence of bleeding. The abdomen and pelvis were copiously irrigated and the drains replaced. Dr. Sterling scrubbed out and I resumed control of the patient. We closed the abdominal wall musculature in two layers, placed interrupted 3-0 vicryl in the deep dermis, and closed the skin with staples and dry dressings.     All needle, sponge, and instrument counts were accurate.  The patient tolerated the procedure well without apparent complications and was trasfered to the PACU in good condition.  Faculty was present for critical portions of the procedure.

## 2023-03-01 NOTE — PROGRESS NOTES
Final positive donor sputum culture results have been uploaded to [employer].  Donor ID [id number].  Dr. Sterling & Aarti notified of results.

## 2023-03-01 NOTE — PLAN OF CARE
Neuro: sedated on propofol and fentanyl; rass -4  Cardio: NSR, on levo, MAP goal >65; cryo released for fibrinogen of 164; dependent edema  Respiratory: ETT, CMV FiO2 40%, RR 14, , PEEP 5  GI/:insulin gtt, alg 3; sethi adequate UO, hypoactive BS; OG to LCS  Skin: incision covered with op dressing; JUANA with moderate sanguinous output; abrasion and open skin tear on buttock; jaundiced    Arrived to unit from OR @ 1700    Handoff given to following RN. For VS and complete assessments, please see documentation flowsheets.

## 2023-03-01 NOTE — PROGRESS NOTES
Transplant Surgery  Inpatient Daily Progress Note  03/01/2023    Assessment & Plan: Dillon Salter is a 27 year old male with a past medical history of Asperger's, DM2, and alcoholic cirrhosis c/b esophageal varices and hepatic encephalopathy. He is now s/p DDLT on 2/28/23 with Dr. Sterling.     s/p DDLT 2/28/23: POD#1. Tbili 5 (from 9), transaminases elevated, Alk Phos WNL. LA 5.6. Post op US patent with hematoma noted. Plan for RTOR today d/t bleeding.     Immunosuppression management:  Induction: Steroid taper and basiliximab x2 per renal sparing protocol.  Maintenance:   - mg BID  -Tacrolimus 2 mg BID. Plan for lower goal with renal dysfunction.    Neuro/Psych:  Acute post op pain; Chronic musculoskeletal pain: On oxycodone PTA. Continue Fentanyl gtt.   MDD, Anxiety, Autism spectrum: Mother is caretaker, lives with parents. Restart PTA fluoxetine as able.     Hematology:   Anemia of chronic disease/Acute blood loss: Hgb 5 with morning labs; transfuse to goal Hgb >8.  Thrombocytopenia: PLT 60's, monitor.   Coagulopathy: INR 2.06, PTT 34, Fibrinogen 206. Received FFP x2, Cryo x2 overnight.     Cardiorespiratory:   Post op ventilatory support: Continue mechanical ventilation.   Hypovolemic shock: On NE and vasopressin. Receiving blood products as above, IVF, 500 mL albumin bolus overnight. Plan for RTOR today.     GI/Nutrition:   Severe malnutrition in the context of acute illness: Nutrition consulted. NPO.     Endocrine:   DM2; Steroid induced hyperglycemia: Continue insulin gtt.     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 Cr 1.7 (baseline <1). K 5.7 with morning labs; oliguric. Transplant nephrology consulted. Plan for line placement for CRRT today.   Hyperkalemia: K 5.7 with morning labs, plan to shift and place HD line for CRRT.     : Negron to remain due to strict I&O monitoring; RTOR    Infectious  disease: No issues.     Prophylaxis: DVT (mechanical), fall, GI (PPI), fungal (Initially received fluconazole, change to Micafungin d/t CRRT/re-operation), viral (Valcyte), pneumocystis (Bactrim not yet restarted, hold off with hyperkalemia), jalen-op (Zosyn)    Disposition: SICU; RTOR today     CHRISTA/Fellow/Resident Provider: Kelsea Child NP      Faculty: Thelma Sterling M.D.    __________________________________________________________________  Transplant History:    2/28/2023 (Liver), Postoperative day: 1     Interval History: Unable to obtain a history from the patient due to intubation and sedation  Overnight events: Received pRBC x2, FFP x2, Cryo x2, and Albumin 500 mL overnight. Now weaning off pressors, pressures improving with blood products.     ROS:   A 10-point review of systems was negative except as noted above.    Curent Meds:    fluconazole  400 mg Intravenous Q24H     [START ON 3/2/2023] methylPREDNISolone  100 mg Intravenous Once    Followed by     [START ON 3/3/2023] methylPREDNISolone  50 mg Intravenous Once    Followed by     [START ON 3/4/2023] predniSONE  25 mg Oral Once    Followed by     [START ON 3/5/2023] predniSONE  10 mg Oral Once     mycophenolate  750 mg Oral BID IS    Or     mycophenolate  750 mg Oral or NG Tube BID IS     pantoprazole  40 mg Per Feeding Tube BID    Or     pantoprazole  40 mg Intravenous BID     piperacillin-tazobactam  2.25 g Intravenous Q6H     sodium chloride (PF)  3 mL Intravenous Q8H     tacrolimus  2 mg Oral BID IS    Or     tacrolimus  2 mg Oral or NG Tube BID IS     valGANciclovir  450 mg Oral Daily    Or     valGANciclovir  450 mg Oral or NG Tube Daily       Physical Exam:     Admit Weight: 65.9 kg (145 lb 4.5 oz)    Current Vitals:   BP 95/51 (BP Location: Right arm)   Pulse 80   Temp (!) 95.5  F (35.3  C)   Resp 12   Wt 80.5 kg (177 lb 7.5 oz)   SpO2 97%   BMI 29.53 kg/m      Vital sign ranges:    Temp:  [95.5  F (35.3  C)-98.6  F (37  C)] 95.5  F  (35.3  C)  Pulse:  [] 80  Resp:  [12-27] 12  MAP:  [56 mmHg-89 mmHg] 88 mmHg  Arterial Line BP: ()/(29-78) 120/71  FiO2 (%):  [30 %-50 %] 30 %  SpO2:  [97 %-100 %] 97 %    General Appearance: intubated/sedated  Skin: warm, dry, jaundice  Heart: NSR  Lungs: MV  Abdomen: abdomen soft, non-distended. Incision covered; small serosanguinous drainage noted. L JUANA with no output; R JUANA with thin sanguinous output.   : Negron present UOP oxana  Extremities: edema: generalized   Neurologic: Sedated. Tremor absent.     Frailty Scores     Frailty Scores 12/20/2022    Final Score Frail    Final Score Number 5          Data:   CMP  Recent Labs   Lab 03/01/23  0859 03/01/23  0844 03/01/23  0740 03/01/23  0609 03/01/23  0608 03/01/23  0411 02/28/23  1655 02/28/23  1606 02/27/23  1406 02/27/23  1057   NA  --   --   --  142  --  138   < > 136   < >  --    POTASSIUM  --   --   --  5.7*  --  5.6*   < > 4.5   < >  --    CHLORIDE  --   --   --  103  --  102   < >  --    < >  --    CO2  --   --   --  25  --  25   < >  --    < >  --    * 146*   < > 114*   < > 110*  128*   < > 161*   < >  --    BUN  --   --   --  60.2*  --  62.0*   < >  --    < >  --    CR  --   --   --  1.67*  --  1.63*   < >  --    < >  --    GFRESTIMATED  --   --   --  56*  --  58*   < >  --    < >  --    SARTHAK  --   --   --  9.4  --  8.9   < >  --    < >  --    ICAW  --   --   --  4.8  --   --   --  5.0   < >  --    MAG  --   --   --  2.1  --  2.0   < >  --   --  2.7*   PHOS  --   --   --  6.7*  --  6.5*   < >  --   --  3.1   AMYLASE  --   --   --  62  --   --   --   --   --  12*   LIPASE  --   --   --  19  --   --   --   --   --   --    ALBUMIN  --   --   --  2.8*  --  1.9*   < >  --    < >  --    BILITOTAL  --   --   --  4.9*  --  5.4*   < >  --    < >  --    ALKPHOS  --   --   --  43  --  53   < >  --    < >  --    AST  --   --   --  347*  --  410*   < >  --    < >  --    ALT  --   --   --  268*  --  306*   < >  --    < >  --     < > = values in  this interval not displayed.     CBC  Recent Labs   Lab 03/01/23  0609 03/01/23  0411 02/28/23  1850 02/28/23  1703   HGB 5.1* 8.3*   < > 9.8*   WBC 7.1 8.9   < > 6.2   PLT 68* 86*   < > 94*   A1C  --   --   --  6.1*    < > = values in this interval not displayed.

## 2023-03-01 NOTE — PROCEDURES
Procedure Note  DATE OF OPERATION: 3/1/2023  PREOPERATIVE DIAGNOSIS: Dialysis line placement   POSTOPERATIVE DIAGNOSIS: Dialysis line placement   OPERATION PERFORMED: Central Venous Catheter Placement  ANESTHESIA: Local  EBL: 5 mL  COMPLICATIONS: None  INDICATIONS FOR PROCEDURE:   The patient is a 27 year old male s/p liver transplant, who requires a central line for IV access.   DESCRIPTION OF OPERATIVE PROCEDURE:   Pre-procedure consent for the procedure was obtained. A time out was performed. The patient was positioned flat supine. The right inguinal region was prepped and draped in sterile fashion. Pt was intubated and sedated at time of line placement. Using ultrasound guidance, the introducer needle was inserted over the femoral vein. Venous blood was aspirated. The syringe was removed and a guidewire was advanced through the introducer needle which was then withdrawn. A small incision was made at the skin surface with a scalpel and 2 sequential dilators were advanced over the guidewire. After appropriate dilation was obtained, the dilator was then removed and double lumin dialysis catheter was advanced over the wire, after which the guide wire was removed. All ports easily flushed and aspirated. The catheter was hubbed and sutured in place  at the skin and a sterile dressing was applied. The patient tolerated the procedure without any hemodynamic compromise. A post-procedure x-ray shows the tip of the catheter within the iliac. Dr Parekh is the supervising surgeon and was available for assistance.  DISPOSITION: stable, OK to use central line    Dillon Morse MD  Surgery resident

## 2023-03-01 NOTE — PROGRESS NOTES
SURGICAL ICU PROGRESS NOTE  03/01/2023      PRIMARY TEAM: Transplant  PRIMARY PHYSICIAN: Dr. Sterling    REASON FOR CRITICAL CARE ADMISSION:  Hemodynamic monitoring and pressor requirements  ADMITTING PHYSICIAN: Dr. Parekh      ASSESSMENT: Dillon Salter is a 27 year old male with Asperger's, T2DM, alcohol related cirrhosis c/b hepatic encephalopathy, history of esophageal varices bleeding (5/2022, 1/2023) and rectal varices s/p sclerotherapy 1/2023, ascites presenting with hematemesis. Underwent EGD 2/11 showed oozing portal hypertensive gastropathy. He is now s/p DDLT 2/28/23 with 20L EBL. He is admitted to the SICU for post-op hemodynamic and respiratory monitoring.  Currently requiring pressors, low urinary output intraoperatively with concern for possible ongoing oozing, transplant aware    Overnight  Going up on pressors, got 2 prbc 2 FFP 1 cryo 2L  albumin   - 1 L LR  - Started Vaso    Changes Today:  - Back to the OR today for washout  - Will place dialysis line for CRRT     Neuro/ pain/ sedation:  -Monitor neurological status. Notify the MD for any acute changes in exam.  -Fentanyl gtt for pain.  -Propofol for sedation.     Pulmonary care:   #Hypoxia 2/2 to fluid overload  - Intubated 14/400/5  - Supplemental oxygen to keep saturation above 92 %.  - Incentive spirometer every 15- 30 minutes when awake.  - ABG pending      Cardiovascular:    - Monitor hemodynamic status. MAP goal >65   - NE 0.25  - Vaso 2.4     GI care:   #ESLD 2/2 alcoholic cirrhosis  #Hx of esophageal varices   #Portal hypertensive gastropathy  #Esophageal ulceration  #Recurrent rectal varices  #Hx of hepatic encephalopathy     Calculated from:  Serum Creatinine: 1.22 mg/dL at 2/27/2023  4:33 AM  Serum Sodium: 132 mmol/L at 2/27/2023  4:33 AM  Total Bilirubin: 8.5 mg/dL at 2/27/2023  4:33 AM  INR(ratio): 1.76 at 2/27/2023  4:33 AM  Age: 27 years  - Liver US: 7.2 x 1.8 x 7.3 right perihepatic hematoma  - Monitor LFTs  - Lactulose    - Rifaximin   - IV Protonix   - Coreg 6.25mg BID   - NPO except ice chips and medications.  -Hold PTA meds on bumex 1 mg daily and spironolactone 100 mg daily  - GI and transplant following         Fluids/ Electrolytes/ Nutrition:   -LR for IV fluid hydration  -ICU electrolyte replacement protocol  -No indication for parenteral nutrition.  -Nutrition consulted. Appreciate recs     Renal/ Fluid Balance:    #ASHISH   Baseline Cr <1 (Cr:1.63)  - BMP q6 hours  -Will continue to monitor intake and output.         Endocrine:    #Stress and steroid Induced hyperglycemia   #Type II Diabetes   -Hgb A1c  5.6%  -Insulin drip      ID/ Antibiotics:  Afebrile. WBC: 7.6     #Day op Abs  - Zoyn x 48 hrs      #Tx prophylaxis   - Bactrim, Valcyte, Maritza     #Tx immunosuppression   - MMF, Tac, Pred taper      Heme:     #Acute on chronic blood loss anemia- stable  #Acute blood loss anemia  -Hemoglobin 5.1 this AM  - Transfuse if hgb <7 or sign/symptoms of hypoperfusion  -Transfusion goals: Hemoglobin greater than 7, fibrinogen greater than 200, INR less than 2, platelets greater than 20      Prophylaxis:    -Mechanical prophylaxis for DVT.   -No chemical DVT prophylaxis due to high risk of bleeding.  - PPIs      MSK:    #Chronic Pain   - PTA oxycodone   - PTA Tylenol   - PT and OT consulted. Appreciate recs.             Prepared by Dillon Morse, PGY2  General Cares/Prophylaxis:    DVT Prophylaxis: Pneumatic Compression Devices  GI Prophylaxis: PPI  Restraints: Restraints for medical healing needed: Not Applicable    Lines/ tubes/ drains:  Negron  ET tube  Left radial art line  2 right IJ MAC lines  Peripheral IV    Disposition:  - Surgical ICU.    Patient seen and discussed with staff.    Dillon Morse MD  General Surgery PGY 2 Resident  Pager: [phone]    ====================================    TODAY'S SUBJECTIVE/INTERVAL HISTORY:   Intubated and sedated     OBJECTIVE:     Temp:  [97  F (36.1  C)-98.6  F (37  C)] 97.7  F (36.5   C)  Pulse:  [] 81  Resp:  [14] 14  MAP:  [56 mmHg-89 mmHg] 62 mmHg  Arterial Line BP: ()/(29-78) 90/53  FiO2 (%):  [40 %-50 %] 40 %  SpO2:  [100 %] 100 %  Vent Mode: CMV/AC  (Continuous Mandatory Ventilation/ Assist Control)  FiO2 (%): 40 %  Resp Rate (Set): 14 breaths/min  Tidal Volume (Set, mL): 400 mL  PEEP (cm H2O): 5 cmH2O  Resp: 14      I/O last 3 completed shifts:  In: 60760.41 [I.V.:6593.41; Other:3500; IV Piggyback:1000]  Out: 34130 [Urine:1685; Drains:897; Blood:04485]    General/Neuro: Intubated and sedated  CV: RRR  Pulm: Mechanically ventilated  Abd: soft; nondistended, incision not saturating  Extremities: no edema  Skin: warm and well-perfused.     LABS:   Arterial Blood Gases   Recent Labs   Lab 02/28/23 1856 02/28/23 1702 02/28/23  1606 02/28/23  1444   PH 7.56* 7.51* 7.44 7.50*   PCO2 31* 40 45 40   PO2 175* 137* 327* 360*   HCO3 28 32* 31* 32*     Complete Blood Count   Recent Labs   Lab 03/01/23  0411 03/01/23  0212 02/28/23 2122 02/28/23  1850 02/28/23  1703   WBC 8.9  --  7.6 5.4 6.2   HGB 8.3* 10.2* 7.8* 9.6* 9.8*   PLT 86*  --  114* 93* 94*     Basic Metabolic Panel  Recent Labs   Lab 03/01/23  0608 03/01/23  0411 03/01/23  0308 02/28/23 2127 02/28/23 2122 02/28/23  1853 02/28/23  1850 02/28/23  1803 02/28/23  1703   NA  --  138  --   --  139  --  138  --  138   POTASSIUM  --  5.6*  --   --  5.2  --  4.6  --  4.6   CHLORIDE  --  102  --   --  102  --  101  --  101   CO2  --  25  --   --  26  --  27  --  28   BUN  --  62.0*  --   --  57.9*  --  57.2*  --  50.8*   CR  --  1.63*  --   --  1.39*  --  1.32*  --  1.20*   * 110*  128* 116*   < > 114*   < > 113*   < > 151*    < > = values in this interval not displayed.     Liver Function Tests  Recent Labs   Lab 03/01/23  0411 03/01/23  0212 02/28/23  2122 02/28/23  1850 02/28/23  1703 02/28/23  1355 02/28/23  0700   *  --  569*  --  633*  --  83*   *  --  190*  --  194*  --  42   ALKPHOS 53  --  60  --  57   --  111   BILITOTAL 5.4*  --  8.9*  --  7.2*  --  6.9*   ALBUMIN 1.9*  --  1.9*  --  2.1*  --  3.8   INR 1.79* 2.15* 1.91* 1.62* 1.86*   < > 1.76*    < > = values in this interval not displayed.     Pancreatic Enzymes  Recent Labs   Lab 02/27/23  1057 02/24/23  1718   AMYLASE 12* 14*     Coagulation Profile  Recent Labs   Lab 03/01/23  0411 03/01/23  0212 02/28/23  2122 02/28/23  1850 02/28/23  1703 02/28/23  1355   INR 1.79* 2.15* 1.91* 1.62* 1.86* 2.12*   PTT 34  --  36  --  48* 108*         IMAGING:   Recent Results (from the past 24 hour(s))   XR Chest Port 1 View    Narrative    EXAM: XR CHEST PORT 1 VIEW  2/28/2023 5:43 PM      HISTORY: eval central line    COMPARISON: Chest x-ray 2/27/2023    FINDINGS: Portable supine AP radiograph of the chest. Right IJ  Panhandle-Ronna catheter tip projects over the proximal left pulmonary  artery. The tip of the endotracheal tube projects over the midthoracic  trachea. The sidehole of the enteric tube projects near the GE  junction and the tip extends below the field of view.    The trachea is midline. No pleural effusion or pneumothorax. Patchy  opacities throughout the right lung are similar to prior. Slightly  increased mild retrocardiac opacity, likely atelectasis. The  visualized upper abdomen is unremarkable.      Impression    IMPRESSION:   1. Right IJ Panhandle-Ronna catheter tip projects over the proximal left  pulmonary artery.  2. Stable mild patchy opacities throughout the right lung, likely  infection versus pulmonary edema.    I have personally reviewed the examination and initial interpretation  and I agree with the findings.    TERRI ROGERS MD         SYSTEM ID:  [id number]   XR Abdomen Port 1 View    Narrative    EXAM: XR ABDOMEN PORT 1 VIEW  2/28/2023 5:47 PM     HISTORY:  OG placement       TECHNIQUE: Single frontal radiograph of the abdomen    COMPARISON:  2/12/2023    FINDINGS:   Single AP portable supine view of the abdomen. Enteric tube with tip  and sidehole  projecting over the stomach. Biliary drain. Right and  left abdominal surgical drains. Surgical staples project over the  upper abdomen..    Nonobstructive bowel gas pattern. No pneumatosis, portal venous gas.      Impression    IMPRESSION:   1. Enteric tube with tip and sidehole projecting over the stomach.  2. Nonobstructive bowel gas pattern.    I have personally reviewed the examination and initial interpretation  and I agree with the findings.    TERRI ROGERS MD         SYSTEM ID:  [id number]   US Liver Transplant    Narrative    Exam:  US LIVER TRANSPLANT  on  2/28/2023 5:50 PM     History:  s/p liver transplant     Comparison: 2/12/2023 US abdominal complete    Findings:  There is no free fluid in the abdomen. 7.2 x 1.8 x 7.3  hypoechogenic collection anterolateral to the right hepatic lobe.    LIVER:  The transplanted liver parenchyma, portal venous system,  hepatic venous system normal sized and echotexture. Common bile duct  are not seen. There are no focal masses There is no intrahepatic  biliary dilatation.    GALLBLADDER: Surgically absent gallbladder. Common bile duct not seen.    PANCREAS:  The visualized pancreas was normal in appearance.    KIDNEY:  The right kidney measures 10.9 cm in length. There is no  hydronephrosis and no hydroureter. Left kidney not seen.    SPLEEN:  The spleen measures 14.2  cm and has normal echotexture.    BLADDER: Partially distended with anechoic fluid. There is no bladder  wall thickening.    DOPPLER:  There is flow towards the liver in the portal venous system.      Portal Venous System Flow is:  45 cm/sec towards the liver in the splenic vein.   62 cm/sec towards the liver  in the extrahepatic portal vein.  83 cm/sec towards the liver in the portal vein at anastomosis.  92 cm/sec towards the liver in the intrahepatic portal vein.  77 cm/sec towards the liver in the right portal vein.  34 cm/sec towards the liver  in the left portal vein.    IVC is patent and flow  towards the heart.    Flow in hepatic vein is:  23 cm/sec towards the IVC in the right hepatic vein   17 cm/sec towards the IVC in the middle hepatic vein  29 cm/sec towards the IVC in the left hepatic vein    Flow in the hepatic artery is:  45 cm/sec peak systolic, 0 cm/sec minimum diastolic and towards the  liver with   1.00 resistive index.   Left hepatic artery: 36 cm/sec with resistive index 0.64.  Right hepatic artery: 32 cm/sec with resistive index 1.      Impression    Impression:   1. 7.2 x 1.8 x 7.3 right perihepatic hematoma.  2. Patent hepatic vasculature with normal directional flow. Elevated  resistive index in the common hepatic and right hepatic arteries is  nonspecific in the setting of recent liver transplant. Recommend  follow-up.    I have personally reviewed the examination and initial interpretation  and I agree with the findings.    TERRI ROGERS MD         SYSTEM ID:  [id number]   XR Abdomen Port 1 View    Impression    RESIDENT PRELIMINARY INTERPRETATION  IMPRESSION:  1. Enteric tube with tip and sidehole projecting over the stomach,  similar to prior.  2. Nonobstructive bowel gas pattern.   XR Abdomen Port 1 View    Impression    RESIDENT PRELIMINARY INTERPRETATION  IMPRESSION:   Enteric tube tip and sidehole projecting over the stomach..

## 2023-03-02 ENCOUNTER — APPOINTMENT (OUTPATIENT)
Dept: GENERAL RADIOLOGY | Facility: CLINIC | Age: 28
DRG: 005 | End: 2023-03-02
Attending: STUDENT IN AN ORGANIZED HEALTH CARE EDUCATION/TRAINING PROGRAM
Payer: COMMERCIAL

## 2023-03-02 LAB
ALBUMIN SERPL BCG-MCNC: 2.4 G/DL (ref 3.5–5.2)
ALBUMIN SERPL BCG-MCNC: 3.3 G/DL (ref 3.5–5.2)
ALBUMIN SERPL BCG-MCNC: 3.4 G/DL (ref 3.5–5.2)
ALP SERPL-CCNC: 49 U/L (ref 40–129)
ALP SERPL-CCNC: 49 U/L (ref 40–129)
ALP SERPL-CCNC: 51 U/L (ref 40–129)
ALP SERPL-CCNC: 57 U/L (ref 40–129)
ALP SERPL-CCNC: 60 U/L (ref 40–129)
ALT SERPL W P-5'-P-CCNC: 422 U/L (ref 10–50)
ALT SERPL W P-5'-P-CCNC: 633 U/L (ref 10–50)
ALT SERPL W P-5'-P-CCNC: 636 U/L (ref 10–50)
ALT SERPL W P-5'-P-CCNC: 640 U/L (ref 10–50)
ALT SERPL W P-5'-P-CCNC: 641 U/L (ref 10–50)
ANION GAP SERPL CALCULATED.3IONS-SCNC: 10 MMOL/L (ref 7–15)
ANION GAP SERPL CALCULATED.3IONS-SCNC: 12 MMOL/L (ref 7–15)
ANION GAP SERPL CALCULATED.3IONS-SCNC: 13 MMOL/L (ref 7–15)
ANION GAP SERPL CALCULATED.3IONS-SCNC: 13 MMOL/L (ref 7–15)
ANION GAP SERPL CALCULATED.3IONS-SCNC: 9 MMOL/L (ref 7–15)
APTT PPP: 29 SECONDS (ref 22–38)
APTT PPP: 32 SECONDS (ref 22–38)
APTT PPP: 33 SECONDS (ref 22–38)
APTT PPP: 33 SECONDS (ref 22–38)
APTT PPP: 34 SECONDS (ref 22–38)
AST SERPL W P-5'-P-CCNC: 264 U/L (ref 10–50)
AST SERPL W P-5'-P-CCNC: 497 U/L (ref 10–50)
AST SERPL W P-5'-P-CCNC: 538 U/L (ref 10–50)
AST SERPL W P-5'-P-CCNC: 624 U/L (ref 10–50)
AST SERPL W P-5'-P-CCNC: 800 U/L (ref 10–50)
BASOPHILS # BLD AUTO: 0 10E3/UL (ref 0–0.2)
BASOPHILS NFR BLD AUTO: 0 %
BILIRUB DIRECT SERPL-MCNC: 2.34 MG/DL (ref 0–0.3)
BILIRUB SERPL-MCNC: 2.5 MG/DL
BILIRUB SERPL-MCNC: 4.3 MG/DL
BILIRUB SERPL-MCNC: 4.3 MG/DL
BILIRUB SERPL-MCNC: 4.6 MG/DL
BILIRUB SERPL-MCNC: 5 MG/DL
BUN SERPL-MCNC: 26.4 MG/DL (ref 6–20)
BUN SERPL-MCNC: 37.6 MG/DL (ref 6–20)
BUN SERPL-MCNC: 38.2 MG/DL (ref 6–20)
BUN SERPL-MCNC: 42.2 MG/DL (ref 6–20)
BUN SERPL-MCNC: 44.4 MG/DL (ref 6–20)
CA-I BLD-MCNC: 5 MG/DL (ref 4.4–5.2)
CA-I BLD-MCNC: 5 MG/DL (ref 4.4–5.2)
CA-I BLD-MCNC: 5.4 MG/DL (ref 4.4–5.2)
CALCIUM SERPL-MCNC: 6.3 MG/DL (ref 8.6–10)
CALCIUM SERPL-MCNC: 9.5 MG/DL (ref 8.6–10)
CALCIUM SERPL-MCNC: 9.5 MG/DL (ref 8.6–10)
CALCIUM SERPL-MCNC: 9.8 MG/DL (ref 8.6–10)
CALCIUM SERPL-MCNC: 9.9 MG/DL (ref 8.6–10)
CHLORIDE SERPL-SCNC: 102 MMOL/L (ref 98–107)
CHLORIDE SERPL-SCNC: 103 MMOL/L (ref 98–107)
CHLORIDE SERPL-SCNC: 117 MMOL/L (ref 98–107)
CREAT SERPL-MCNC: 0.98 MG/DL (ref 0.67–1.17)
CREAT SERPL-MCNC: 1.39 MG/DL (ref 0.67–1.17)
CREAT SERPL-MCNC: 1.41 MG/DL (ref 0.67–1.17)
CREAT SERPL-MCNC: 1.47 MG/DL (ref 0.67–1.17)
CREAT SERPL-MCNC: 1.47 MG/DL (ref 0.67–1.17)
DEPRECATED HCO3 PLAS-SCNC: 18 MMOL/L (ref 22–29)
DEPRECATED HCO3 PLAS-SCNC: 24 MMOL/L (ref 22–29)
DEPRECATED HCO3 PLAS-SCNC: 25 MMOL/L (ref 22–29)
DEPRECATED HCO3 PLAS-SCNC: 26 MMOL/L (ref 22–29)
DEPRECATED HCO3 PLAS-SCNC: 26 MMOL/L (ref 22–29)
EOSINOPHIL # BLD AUTO: 0 10E3/UL (ref 0–0.7)
EOSINOPHIL NFR BLD AUTO: 0 %
ERYTHROCYTE [DISTWIDTH] IN BLOOD BY AUTOMATED COUNT: 14.8 % (ref 10–15)
ERYTHROCYTE [DISTWIDTH] IN BLOOD BY AUTOMATED COUNT: 14.9 % (ref 10–15)
ERYTHROCYTE [DISTWIDTH] IN BLOOD BY AUTOMATED COUNT: 15.9 % (ref 10–15)
ERYTHROCYTE [DISTWIDTH] IN BLOOD BY AUTOMATED COUNT: 15.9 % (ref 10–15)
ERYTHROCYTE [DISTWIDTH] IN BLOOD BY AUTOMATED COUNT: 16.3 % (ref 10–15)
FIBRINOGEN PPP-MCNC: 221 MG/DL (ref 170–490)
FIBRINOGEN PPP-MCNC: 224 MG/DL (ref 170–490)
FIBRINOGEN PPP-MCNC: 233 MG/DL (ref 170–490)
FIBRINOGEN PPP-MCNC: 235 MG/DL (ref 170–490)
FIBRINOGEN PPP-MCNC: 254 MG/DL (ref 170–490)
GFR SERPL CREATININE-BSD FRML MDRD: 66 ML/MIN/1.73M2
GFR SERPL CREATININE-BSD FRML MDRD: 66 ML/MIN/1.73M2
GFR SERPL CREATININE-BSD FRML MDRD: 69 ML/MIN/1.73M2
GFR SERPL CREATININE-BSD FRML MDRD: 70 ML/MIN/1.73M2
GFR SERPL CREATININE-BSD FRML MDRD: >90 ML/MIN/1.73M2
GLUCOSE BLDC GLUCOMTR-MCNC: 104 MG/DL (ref 70–99)
GLUCOSE BLDC GLUCOMTR-MCNC: 108 MG/DL (ref 70–99)
GLUCOSE BLDC GLUCOMTR-MCNC: 113 MG/DL (ref 70–99)
GLUCOSE BLDC GLUCOMTR-MCNC: 114 MG/DL (ref 70–99)
GLUCOSE BLDC GLUCOMTR-MCNC: 114 MG/DL (ref 70–99)
GLUCOSE BLDC GLUCOMTR-MCNC: 115 MG/DL (ref 70–99)
GLUCOSE BLDC GLUCOMTR-MCNC: 117 MG/DL (ref 70–99)
GLUCOSE BLDC GLUCOMTR-MCNC: 118 MG/DL (ref 70–99)
GLUCOSE BLDC GLUCOMTR-MCNC: 120 MG/DL (ref 70–99)
GLUCOSE BLDC GLUCOMTR-MCNC: 123 MG/DL (ref 70–99)
GLUCOSE BLDC GLUCOMTR-MCNC: 125 MG/DL (ref 70–99)
GLUCOSE BLDC GLUCOMTR-MCNC: 129 MG/DL (ref 70–99)
GLUCOSE BLDC GLUCOMTR-MCNC: 129 MG/DL (ref 70–99)
GLUCOSE BLDC GLUCOMTR-MCNC: 130 MG/DL (ref 70–99)
GLUCOSE BLDC GLUCOMTR-MCNC: 136 MG/DL (ref 70–99)
GLUCOSE BLDC GLUCOMTR-MCNC: 149 MG/DL (ref 70–99)
GLUCOSE BLDC GLUCOMTR-MCNC: 88 MG/DL (ref 70–99)
GLUCOSE BLDC GLUCOMTR-MCNC: 99 MG/DL (ref 70–99)
GLUCOSE SERPL-MCNC: 101 MG/DL (ref 70–99)
GLUCOSE SERPL-MCNC: 112 MG/DL (ref 70–99)
GLUCOSE SERPL-MCNC: 117 MG/DL (ref 70–99)
GLUCOSE SERPL-MCNC: 135 MG/DL (ref 70–99)
GLUCOSE SERPL-MCNC: 152 MG/DL (ref 70–99)
HBV DNA SERPL QL NAA+PROBE: NORMAL
HCT VFR BLD AUTO: 25 % (ref 40–53)
HCT VFR BLD AUTO: 25 % (ref 40–53)
HCT VFR BLD AUTO: 27.4 % (ref 40–53)
HCT VFR BLD AUTO: 27.6 % (ref 40–53)
HCT VFR BLD AUTO: 29 % (ref 40–53)
HCV RNA SERPL QL NAA+PROBE: NORMAL
HGB BLD-MCNC: 8.7 G/DL (ref 13.3–17.7)
HGB BLD-MCNC: 8.7 G/DL (ref 13.3–17.7)
HGB BLD-MCNC: 9.5 G/DL (ref 13.3–17.7)
HGB BLD-MCNC: 9.7 G/DL (ref 13.3–17.7)
HGB BLD-MCNC: 9.9 G/DL (ref 13.3–17.7)
HIV1+2 RNA SERPL QL NAA+PROBE: NORMAL
IMM GRANULOCYTES # BLD: 0.1 10E3/UL
IMM GRANULOCYTES NFR BLD: 1 %
INR PPP: 1.65 (ref 0.85–1.15)
INR PPP: 1.65 (ref 0.85–1.15)
INR PPP: 1.72 (ref 0.85–1.15)
LACTATE SERPL-SCNC: 0.9 MMOL/L (ref 0.7–2)
LACTATE SERPL-SCNC: 1 MMOL/L (ref 0.7–2)
LACTATE SERPL-SCNC: 1 MMOL/L (ref 0.7–2)
LACTATE SERPL-SCNC: 1.3 MMOL/L (ref 0.7–2)
LYMPHOCYTES # BLD AUTO: 0.2 10E3/UL (ref 0.8–5.3)
LYMPHOCYTES NFR BLD AUTO: 4 %
MAGNESIUM SERPL-MCNC: 1.6 MG/DL (ref 1.7–2.3)
MAGNESIUM SERPL-MCNC: 2 MG/DL (ref 1.7–2.3)
MAGNESIUM SERPL-MCNC: 2.1 MG/DL (ref 1.7–2.3)
MAGNESIUM SERPL-MCNC: 2.2 MG/DL (ref 1.7–2.3)
MAGNESIUM SERPL-MCNC: 2.3 MG/DL (ref 1.7–2.3)
MCH RBC QN AUTO: 30.1 PG (ref 26.5–33)
MCH RBC QN AUTO: 30.2 PG (ref 26.5–33)
MCH RBC QN AUTO: 30.3 PG (ref 26.5–33)
MCH RBC QN AUTO: 30.7 PG (ref 26.5–33)
MCH RBC QN AUTO: 30.7 PG (ref 26.5–33)
MCHC RBC AUTO-ENTMCNC: 34.1 G/DL (ref 31.5–36.5)
MCHC RBC AUTO-ENTMCNC: 34.7 G/DL (ref 31.5–36.5)
MCHC RBC AUTO-ENTMCNC: 34.8 G/DL (ref 31.5–36.5)
MCHC RBC AUTO-ENTMCNC: 34.8 G/DL (ref 31.5–36.5)
MCHC RBC AUTO-ENTMCNC: 35.1 G/DL (ref 31.5–36.5)
MCV RBC AUTO: 87 FL (ref 78–100)
MCV RBC AUTO: 90 FL (ref 78–100)
MONOCYTES # BLD AUTO: 0.2 10E3/UL (ref 0–1.3)
MONOCYTES NFR BLD AUTO: 3 %
NEUTROPHILS # BLD AUTO: 5.1 10E3/UL (ref 1.6–8.3)
NEUTROPHILS NFR BLD AUTO: 92 %
NRBC # BLD AUTO: 0 10E3/UL
NRBC BLD AUTO-RTO: 0 /100
PHOSPHATE SERPL-MCNC: 3.9 MG/DL (ref 2.5–4.5)
PHOSPHATE SERPL-MCNC: 5.5 MG/DL (ref 2.5–4.5)
PHOSPHATE SERPL-MCNC: 5.6 MG/DL (ref 2.5–4.5)
PHOSPHATE SERPL-MCNC: 5.7 MG/DL (ref 2.5–4.5)
PHOSPHATE SERPL-MCNC: 6.1 MG/DL (ref 2.5–4.5)
PLATELET # BLD AUTO: 49 10E3/UL (ref 150–450)
PLATELET # BLD AUTO: 50 10E3/UL (ref 150–450)
PLATELET # BLD AUTO: 60 10E3/UL (ref 150–450)
PLATELET # BLD AUTO: 61 10E3/UL (ref 150–450)
PLATELET # BLD AUTO: 67 10E3/UL (ref 150–450)
POTASSIUM SERPL-SCNC: 3.4 MMOL/L (ref 3.4–5.3)
POTASSIUM SERPL-SCNC: 4.6 MMOL/L (ref 3.4–5.3)
POTASSIUM SERPL-SCNC: 4.7 MMOL/L (ref 3.4–5.3)
POTASSIUM SERPL-SCNC: 4.7 MMOL/L (ref 3.4–5.3)
POTASSIUM SERPL-SCNC: 4.9 MMOL/L (ref 3.4–5.3)
PROT SERPL-MCNC: 3.7 G/DL (ref 6.4–8.3)
PROT SERPL-MCNC: 4.9 G/DL (ref 6.4–8.3)
PROT SERPL-MCNC: 5 G/DL (ref 6.4–8.3)
RBC # BLD AUTO: 2.88 10E6/UL (ref 4.4–5.9)
RBC # BLD AUTO: 2.89 10E6/UL (ref 4.4–5.9)
RBC # BLD AUTO: 3.14 10E6/UL (ref 4.4–5.9)
RBC # BLD AUTO: 3.16 10E6/UL (ref 4.4–5.9)
RBC # BLD AUTO: 3.23 10E6/UL (ref 4.4–5.9)
SODIUM SERPL-SCNC: 139 MMOL/L (ref 136–145)
SODIUM SERPL-SCNC: 139 MMOL/L (ref 136–145)
SODIUM SERPL-SCNC: 141 MMOL/L (ref 136–145)
SODIUM SERPL-SCNC: 141 MMOL/L (ref 136–145)
SODIUM SERPL-SCNC: 144 MMOL/L (ref 136–145)
WBC # BLD AUTO: 5.2 10E3/UL (ref 4–11)
WBC # BLD AUTO: 5.5 10E3/UL (ref 4–11)
WBC # BLD AUTO: 7.6 10E3/UL (ref 4–11)
WBC # BLD AUTO: 7.8 10E3/UL (ref 4–11)
WBC # BLD AUTO: 9.9 10E3/UL (ref 4–11)

## 2023-03-02 PROCEDURE — 250N000012 HC RX MED GY IP 250 OP 636 PS 637: Performed by: SURGERY

## 2023-03-02 PROCEDURE — 85730 THROMBOPLASTIN TIME PARTIAL: CPT | Performed by: STUDENT IN AN ORGANIZED HEALTH CARE EDUCATION/TRAINING PROGRAM

## 2023-03-02 PROCEDURE — 82330 ASSAY OF CALCIUM: CPT | Performed by: INTERNAL MEDICINE

## 2023-03-02 PROCEDURE — 258N000003 HC RX IP 258 OP 636: Performed by: NURSE PRACTITIONER

## 2023-03-02 PROCEDURE — 71045 X-RAY EXAM CHEST 1 VIEW: CPT | Mod: 26 | Performed by: RADIOLOGY

## 2023-03-02 PROCEDURE — 84155 ASSAY OF PROTEIN SERUM: CPT | Performed by: STUDENT IN AN ORGANIZED HEALTH CARE EDUCATION/TRAINING PROGRAM

## 2023-03-02 PROCEDURE — 250N000011 HC RX IP 250 OP 636: Performed by: SURGERY

## 2023-03-02 PROCEDURE — 250N000011 HC RX IP 250 OP 636: Performed by: STUDENT IN AN ORGANIZED HEALTH CARE EDUCATION/TRAINING PROGRAM

## 2023-03-02 PROCEDURE — C9113 INJ PANTOPRAZOLE SODIUM, VIA: HCPCS | Performed by: STUDENT IN AN ORGANIZED HEALTH CARE EDUCATION/TRAINING PROGRAM

## 2023-03-02 PROCEDURE — 82248 BILIRUBIN DIRECT: CPT | Performed by: INTERNAL MEDICINE

## 2023-03-02 PROCEDURE — 999N000065 XR CHEST PORT 1 VIEW

## 2023-03-02 PROCEDURE — 83605 ASSAY OF LACTIC ACID: CPT | Performed by: STUDENT IN AN ORGANIZED HEALTH CARE EDUCATION/TRAINING PROGRAM

## 2023-03-02 PROCEDURE — 85610 PROTHROMBIN TIME: CPT | Performed by: STUDENT IN AN ORGANIZED HEALTH CARE EDUCATION/TRAINING PROGRAM

## 2023-03-02 PROCEDURE — 999N000065 XR ABDOMEN PORT 1 VIEW

## 2023-03-02 PROCEDURE — 85610 PROTHROMBIN TIME: CPT

## 2023-03-02 PROCEDURE — 80053 COMPREHEN METABOLIC PANEL: CPT | Performed by: STUDENT IN AN ORGANIZED HEALTH CARE EDUCATION/TRAINING PROGRAM

## 2023-03-02 PROCEDURE — 84100 ASSAY OF PHOSPHORUS: CPT | Performed by: INTERNAL MEDICINE

## 2023-03-02 PROCEDURE — 83735 ASSAY OF MAGNESIUM: CPT | Performed by: STUDENT IN AN ORGANIZED HEALTH CARE EDUCATION/TRAINING PROGRAM

## 2023-03-02 PROCEDURE — 250N000011 HC RX IP 250 OP 636

## 2023-03-02 PROCEDURE — 84100 ASSAY OF PHOSPHORUS: CPT | Performed by: STUDENT IN AN ORGANIZED HEALTH CARE EDUCATION/TRAINING PROGRAM

## 2023-03-02 PROCEDURE — 85384 FIBRINOGEN ACTIVITY: CPT

## 2023-03-02 PROCEDURE — 85384 FIBRINOGEN ACTIVITY: CPT | Performed by: STUDENT IN AN ORGANIZED HEALTH CARE EDUCATION/TRAINING PROGRAM

## 2023-03-02 PROCEDURE — 999N000157 HC STATISTIC RCP TIME EA 10 MIN

## 2023-03-02 PROCEDURE — 0DH83UZ INSERTION OF FEEDING DEVICE INTO SMALL INTESTINE, PERCUTANEOUS APPROACH: ICD-10-PCS | Performed by: NURSE PRACTITIONER

## 2023-03-02 PROCEDURE — 250N000011 HC RX IP 250 OP 636: Performed by: NURSE PRACTITIONER

## 2023-03-02 PROCEDURE — 258N000001 HC RX 258: Performed by: SURGERY

## 2023-03-02 PROCEDURE — 90947 DIALYSIS REPEATED EVAL: CPT

## 2023-03-02 PROCEDURE — 250N000013 HC RX MED GY IP 250 OP 250 PS 637: Performed by: SURGERY

## 2023-03-02 PROCEDURE — 250N000013 HC RX MED GY IP 250 OP 250 PS 637: Performed by: STUDENT IN AN ORGANIZED HEALTH CARE EDUCATION/TRAINING PROGRAM

## 2023-03-02 PROCEDURE — 94003 VENT MGMT INPAT SUBQ DAY: CPT

## 2023-03-02 PROCEDURE — 200N000002 HC R&B ICU UMMC

## 2023-03-02 PROCEDURE — 44500 INTRO GASTROINTESTINAL TUBE: CPT

## 2023-03-02 PROCEDURE — 250N000009 HC RX 250: Performed by: STUDENT IN AN ORGANIZED HEALTH CARE EDUCATION/TRAINING PROGRAM

## 2023-03-02 PROCEDURE — 99233 SBSQ HOSP IP/OBS HIGH 50: CPT | Mod: 24 | Performed by: INTERNAL MEDICINE

## 2023-03-02 PROCEDURE — 250N000009 HC RX 250: Performed by: INTERNAL MEDICINE

## 2023-03-02 PROCEDURE — 83735 ASSAY OF MAGNESIUM: CPT | Performed by: INTERNAL MEDICINE

## 2023-03-02 PROCEDURE — 85027 COMPLETE CBC AUTOMATED: CPT | Performed by: STUDENT IN AN ORGANIZED HEALTH CARE EDUCATION/TRAINING PROGRAM

## 2023-03-02 PROCEDURE — 85025 COMPLETE CBC W/AUTO DIFF WBC: CPT | Performed by: SURGERY

## 2023-03-02 PROCEDURE — 74018 RADEX ABDOMEN 1 VIEW: CPT | Mod: 26 | Performed by: RADIOLOGY

## 2023-03-02 RX ORDER — PREDNISONE 5 MG/1
5 TABLET ORAL DAILY
Status: DISCONTINUED | OUTPATIENT
Start: 2023-03-06 | End: 2023-03-19 | Stop reason: HOSPADM

## 2023-03-02 RX ORDER — CALCIUM CHLORIDE, MAGNESIUM CHLORIDE, SODIUM CHLORIDE, SODIUM BICARBONATE, POTASSIUM CHLORIDE AND SODIUM PHOSPHATE DIBASIC DIHYDRATE 3.68; 3.05; 6.34; 3.09; .314; .187 G/L; G/L; G/L; G/L; G/L; G/L
INJECTION INTRAVENOUS CONTINUOUS
Status: DISCONTINUED | OUTPATIENT
Start: 2023-03-02 | End: 2023-03-03

## 2023-03-02 RX ORDER — LIDOCAINE HYDROCHLORIDE 20 MG/ML
5 SOLUTION OROPHARYNGEAL ONCE
Status: COMPLETED | OUTPATIENT
Start: 2023-03-02 | End: 2023-03-02

## 2023-03-02 RX ORDER — CALCIUM GLUCONATE 20 MG/ML
2 INJECTION, SOLUTION INTRAVENOUS EVERY 8 HOURS PRN
Status: DISCONTINUED | OUTPATIENT
Start: 2023-03-02 | End: 2023-03-03

## 2023-03-02 RX ORDER — CALCIUM CHLORIDE, MAGNESIUM CHLORIDE, SODIUM CHLORIDE, SODIUM BICARBONATE, POTASSIUM CHLORIDE AND SODIUM PHOSPHATE DIBASIC DIHYDRATE 3.68; 3.05; 6.34; 3.09; .314; .187 G/L; G/L; G/L; G/L; G/L; G/L
12.5 INJECTION INTRAVENOUS CONTINUOUS
Status: DISCONTINUED | OUTPATIENT
Start: 2023-03-02 | End: 2023-03-03

## 2023-03-02 RX ORDER — OXYCODONE HYDROCHLORIDE 5 MG/1
5 TABLET ORAL EVERY 4 HOURS PRN
Status: DISCONTINUED | OUTPATIENT
Start: 2023-03-02 | End: 2023-03-05

## 2023-03-02 RX ORDER — METHOCARBAMOL 500 MG/1
500 TABLET, FILM COATED ORAL 4 TIMES DAILY
Status: DISCONTINUED | OUTPATIENT
Start: 2023-03-02 | End: 2023-03-06

## 2023-03-02 RX ORDER — VIT B COMP NO.3/FOLIC/C/BIOTIN 1 MG-60 MG
1 TABLET ORAL DAILY
Status: DISCONTINUED | OUTPATIENT
Start: 2023-03-02 | End: 2023-03-19 | Stop reason: HOSPADM

## 2023-03-02 RX ORDER — HYDROMORPHONE HCL IN WATER/PF 6 MG/30 ML
0.2 PATIENT CONTROLLED ANALGESIA SYRINGE INTRAVENOUS
Status: DISCONTINUED | OUTPATIENT
Start: 2023-03-02 | End: 2023-03-03

## 2023-03-02 RX ORDER — DEXTROSE MONOHYDRATE 100 MG/ML
INJECTION, SOLUTION INTRAVENOUS CONTINUOUS PRN
Status: DISCONTINUED | OUTPATIENT
Start: 2023-03-02 | End: 2023-03-19 | Stop reason: HOSPADM

## 2023-03-02 RX ORDER — MAGNESIUM SULFATE HEPTAHYDRATE 40 MG/ML
2 INJECTION, SOLUTION INTRAVENOUS EVERY 8 HOURS PRN
Status: DISCONTINUED | OUTPATIENT
Start: 2023-03-02 | End: 2023-03-03

## 2023-03-02 RX ORDER — ACETAMINOPHEN 325 MG/1
650 TABLET ORAL EVERY 8 HOURS
Status: DISCONTINUED | OUTPATIENT
Start: 2023-03-02 | End: 2023-03-06

## 2023-03-02 RX ORDER — POTASSIUM CHLORIDE 29.8 MG/ML
20 INJECTION INTRAVENOUS EVERY 8 HOURS PRN
Status: DISCONTINUED | OUTPATIENT
Start: 2023-03-02 | End: 2023-03-03

## 2023-03-02 RX ADMIN — METHYLPREDNISOLONE SODIUM SUCCINATE 100 MG: 125 INJECTION, POWDER, FOR SOLUTION INTRAMUSCULAR; INTRAVENOUS at 08:09

## 2023-03-02 RX ADMIN — MYCOPHENOLATE MOFETIL 750 MG: 200 POWDER, FOR SUSPENSION ORAL at 17:26

## 2023-03-02 RX ADMIN — CALCIUM CHLORIDE, MAGNESIUM CHLORIDE, DEXTROSE MONOHYDRATE, LACTIC ACID, SODIUM CHLORIDE, SODIUM BICARBONATE AND POTASSIUM CHLORIDE 12.5 ML/KG/HR: 5.15; 2.03; 22; 5.4; 6.46; 3.09; .157 INJECTION INTRAVENOUS at 06:25

## 2023-03-02 RX ADMIN — ACETAMINOPHEN 650 MG: 325 TABLET, FILM COATED ORAL at 17:26

## 2023-03-02 RX ADMIN — TACROLIMUS 2 MG: 5 CAPSULE ORAL at 08:09

## 2023-03-02 RX ADMIN — METHOCARBAMOL 500 MG: 500 TABLET ORAL at 16:16

## 2023-03-02 RX ADMIN — ACETAMINOPHEN 650 MG: 325 TABLET, FILM COATED ORAL at 09:52

## 2023-03-02 RX ADMIN — PANTOPRAZOLE SODIUM 40 MG: 40 INJECTION, POWDER, FOR SOLUTION INTRAVENOUS at 08:10

## 2023-03-02 RX ADMIN — MICAFUNGIN 100 MG: 10 INJECTION, POWDER, LYOPHILIZED, FOR SOLUTION INTRAVENOUS at 08:10

## 2023-03-02 RX ADMIN — CALCIUM CHLORIDE, MAGNESIUM CHLORIDE, SODIUM CHLORIDE, SODIUM BICARBONATE, POTASSIUM CHLORIDE AND SODIUM PHOSPHATE DIBASIC DIHYDRATE 12.5 ML/KG/HR: 3.68; 3.05; 6.34; 3.09; .314; .187 INJECTION INTRAVENOUS at 06:47

## 2023-03-02 RX ADMIN — HYDROMORPHONE HYDROCHLORIDE 0.2 MG: 0.2 INJECTION, SOLUTION INTRAMUSCULAR; INTRAVENOUS; SUBCUTANEOUS at 23:02

## 2023-03-02 RX ADMIN — B-COMPLEX W/ C & FOLIC ACID TAB 1 MG 1 TABLET: 1 TAB at 14:41

## 2023-03-02 RX ADMIN — CALCIUM CHLORIDE, MAGNESIUM CHLORIDE, DEXTROSE MONOHYDRATE, LACTIC ACID, SODIUM CHLORIDE, SODIUM BICARBONATE AND POTASSIUM CHLORIDE 12.5 ML/KG/HR: 5.15; 2.03; 22; 5.4; 6.46; 3.09; .157 INJECTION INTRAVENOUS at 01:36

## 2023-03-02 RX ADMIN — OXYCODONE HYDROCHLORIDE 5 MG: 5 TABLET ORAL at 20:19

## 2023-03-02 RX ADMIN — CALCIUM CHLORIDE, MAGNESIUM CHLORIDE, SODIUM CHLORIDE, SODIUM BICARBONATE, POTASSIUM CHLORIDE AND SODIUM PHOSPHATE DIBASIC DIHYDRATE 12.5 ML/KG/HR: 3.68; 3.05; 6.34; 3.09; .314; .187 INJECTION INTRAVENOUS at 21:44

## 2023-03-02 RX ADMIN — LIDOCAINE HYDROCHLORIDE 5 ML: 20 SOLUTION ORAL; TOPICAL at 10:17

## 2023-03-02 RX ADMIN — PIPERACILLIN SODIUM AND TAZOBACTAM SODIUM 4.5 G: 4; .5 INJECTION, POWDER, LYOPHILIZED, FOR SOLUTION INTRAVENOUS at 01:45

## 2023-03-02 RX ADMIN — PROCHLORPERAZINE EDISYLATE 10 MG: 5 INJECTION INTRAMUSCULAR; INTRAVENOUS at 22:04

## 2023-03-02 RX ADMIN — METHOCARBAMOL 500 MG: 500 TABLET ORAL at 20:19

## 2023-03-02 RX ADMIN — MYCOPHENOLATE MOFETIL 750 MG: 200 POWDER, FOR SUSPENSION ORAL at 08:09

## 2023-03-02 RX ADMIN — VALGANCICLOVIR HYDROCHLORIDE 450 MG: 50 POWDER, FOR SOLUTION ORAL at 08:09

## 2023-03-02 RX ADMIN — DEXTROSE AND SODIUM CHLORIDE: 5; 450 INJECTION, SOLUTION INTRAVENOUS at 03:00

## 2023-03-02 RX ADMIN — HYDROMORPHONE HYDROCHLORIDE 0.2 MG: 0.2 INJECTION, SOLUTION INTRAMUSCULAR; INTRAVENOUS; SUBCUTANEOUS at 20:19

## 2023-03-02 RX ADMIN — Medication 40 MG: at 20:26

## 2023-03-02 RX ADMIN — METHOCARBAMOL 500 MG: 500 TABLET ORAL at 09:52

## 2023-03-02 RX ADMIN — TACROLIMUS 2 MG: 5 CAPSULE ORAL at 17:26

## 2023-03-02 RX ADMIN — PIPERACILLIN SODIUM AND TAZOBACTAM SODIUM 4.5 G: 4; .5 INJECTION, POWDER, LYOPHILIZED, FOR SOLUTION INTRAVENOUS at 14:41

## 2023-03-02 RX ADMIN — CALCIUM CHLORIDE, MAGNESIUM CHLORIDE, SODIUM CHLORIDE, SODIUM BICARBONATE, POTASSIUM CHLORIDE AND SODIUM PHOSPHATE DIBASIC DIHYDRATE 12.5 ML/KG/HR: 3.68; 3.05; 6.34; 3.09; .314; .187 INJECTION INTRAVENOUS at 16:43

## 2023-03-02 RX ADMIN — CALCIUM CHLORIDE, MAGNESIUM CHLORIDE, SODIUM CHLORIDE, SODIUM BICARBONATE, POTASSIUM CHLORIDE AND SODIUM PHOSPHATE DIBASIC DIHYDRATE 12.5 ML/KG/HR: 3.68; 3.05; 6.34; 3.09; .314; .187 INJECTION INTRAVENOUS at 16:44

## 2023-03-02 RX ADMIN — PIPERACILLIN SODIUM AND TAZOBACTAM SODIUM 4.5 G: 4; .5 INJECTION, POWDER, LYOPHILIZED, FOR SOLUTION INTRAVENOUS at 07:50

## 2023-03-02 RX ADMIN — HUMAN INSULIN 2 UNITS/HR: 100 INJECTION, SOLUTION SUBCUTANEOUS at 21:30

## 2023-03-02 RX ADMIN — CALCIUM CHLORIDE, MAGNESIUM CHLORIDE, DEXTROSE MONOHYDRATE, LACTIC ACID, SODIUM CHLORIDE, SODIUM BICARBONATE AND POTASSIUM CHLORIDE: 5.15; 2.03; 22; 5.4; 6.46; 3.09; .157 INJECTION INTRAVENOUS at 01:35

## 2023-03-02 RX ADMIN — CALCIUM CHLORIDE, MAGNESIUM CHLORIDE, SODIUM CHLORIDE, SODIUM BICARBONATE, POTASSIUM CHLORIDE AND SODIUM PHOSPHATE DIBASIC DIHYDRATE 12.5 ML/KG/HR: 3.68; 3.05; 6.34; 3.09; .314; .187 INJECTION INTRAVENOUS at 11:30

## 2023-03-02 RX ADMIN — Medication 100 MCG/HR: at 01:44

## 2023-03-02 RX ADMIN — CALCIUM CHLORIDE, MAGNESIUM CHLORIDE, DEXTROSE MONOHYDRATE, LACTIC ACID, SODIUM CHLORIDE, SODIUM BICARBONATE AND POTASSIUM CHLORIDE 12.5 ML/KG/HR: 5.15; 2.03; 22; 5.4; 6.46; 3.09; .157 INJECTION INTRAVENOUS at 04:32

## 2023-03-02 RX ADMIN — CALCIUM CHLORIDE, MAGNESIUM CHLORIDE, SODIUM CHLORIDE, SODIUM BICARBONATE, POTASSIUM CHLORIDE AND SODIUM PHOSPHATE DIBASIC DIHYDRATE: 3.68; 3.05; 6.34; 3.09; .314; .187 INJECTION INTRAVENOUS at 06:47

## 2023-03-02 ASSESSMENT — ACTIVITIES OF DAILY LIVING (ADL)
ADLS_ACUITY_SCORE: 33
ADLS_ACUITY_SCORE: 35
ADLS_ACUITY_SCORE: 33

## 2023-03-02 NOTE — PROCEDURES
Procedure Note  DATE OF OPERATION: 3/2/2023  PREOPERATIVE DIAGNOSIS: s/p liver transplant  POSTOPERATIVE DIAGNOSIS: s/p liver transplant  OPERATION PERFORMED: Central Venous Catheter Placement  ANESTHESIA: Local and IV  EBL: 1 mL  COMPLICATIONS: None  INDICATIONS FOR PROCEDURE:   The patient is a 27 year old male s/p liver transplant, who requires a dialysis line for CRRT.   DESCRIPTION OF OPERATIVE PROCEDURE:   Pre-procedure consent for the procedure was obtained. A time out was performed. The patient was placed in Trendelenburg position. The right neck region was prepped and draped in sterile fashion. MAC line was further sterilized and a guidewire was advanced through the central lumin of the MAC line, appropriate location was confirmed with US guidance, MAC line was then withdrawn. A dialysis line was placed over the guide wire, which was then removed. All ports easily flushed and aspirated. The catheter was sutured in place at 18 cm at the skin and a sterile dressing was applied. The patient tolerated the procedure without any hemodynamic compromise. A post-procedure x-ray shows the tip of the catheter within the SVC and no pneumothorax. Dr Parekh is the supervising surgeon and was available for assistance.  DISPOSITION: stable, OK to use central line    Dillon Morse MD  Surgery resident

## 2023-03-02 NOTE — PROGRESS NOTES
Transplant Surgery  Inpatient Daily Progress Note  2023    Assessment & Plan: Dillon Salter is a 27 year old male with a past medical history of Asperger's, DM2, and alcoholic cirrhosis c/b esophageal varices and hepatic encephalopathy. He is now s/p  donor liver transplant without stent on 23 with Dr. Sterling. Return to OR for washout and repair of arterial bleeding on 3/1/23.    s/p DDLT 23: POD #2. Tbili 5->4.6, transaminases slowly trending donw, Alk Phos WNL. LA 0.9.   -3/1 US: resolution of hematoma, patent vessels    Immunosuppression management:  Induction: Steroid taper and basiliximab x2 per renal sparing protocol.  Maintenance:   - mg BID  -Tacrolimus goal level 5-8.  -Prednisone 5mg daily after taper.    Neuro/Psych:  Acute post op pain; Chronic musculoskeletal pain: On oxycodone PTA. Continue Fentanyl gtt.   MDD, Anxiety, Autism spectrum: Mother is caretaker, lives with parents. Restart PTA fluoxetine as able.     Hematology:   Anemia of chronic disease/Acute blood loss: Hgb 8.7, now stable.  Thrombocytopenia: PLT 50, no need to transfuse further.  Coagulopathy: INR 1.7, Fibrinogen 235. Received FFP and cryo overnight.    Cardiorespiratory:   Post op ventilatory support: PS trials as able.  Hypovolemic/hemorrhagic shock: Secondary to bleeding. Resolved.    GI/Nutrition:   Severe malnutrition in the context of acute illness: Nutrition consulted. Recommend FT placement.    Endocrine:   DM2; Steroid induced hyperglycemia: Continue insulin gtt.     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 CRRT started 3/1/23.  Hyperkalemia: Resolved with CRRT.    : Negron removed. Check periodic bladder scans.    Infectious disease: No issues.     Prophylaxis: DVT (mechanical), fall, GI (PPI), fungal (Initially received fluconazole, change to Micafungin d/t CRRT/re-operation), viral  (Valcyte), pneumocystis (Bactrim not yet restarted, hold off with hyperkalemia), jalen-op (Zosyn)    Disposition: SICU    CHRISTA/Fellow/Resident Provider: Martha Valentino NP   20 minutes spent on chart review, exam, and discussion with Nephrology.    Faculty: Thelma Sterling M.D.    __________________________________________________________________  Transplant History:    2/28/2023 (Liver), Postoperative day: 2     Interval History: Unable to obtain a history from the patient due to intubation and sedation  Overnight events: stabilized overnight.    ROS:   A 10-point review of systems was negative except as noted above.    Curent Meds:    acetaminophen  650 mg Oral Q8H     [START ON 3/5/2023] basiliximab (SIMULECT) infusion  20 mg Intravenous Once     methocarbamol  500 mg Oral 4x Daily     [START ON 3/3/2023] methylPREDNISolone  50 mg Intravenous Once    Followed by     [START ON 3/4/2023] predniSONE  25 mg Oral Once    Followed by     [START ON 3/5/2023] predniSONE  10 mg Oral Once     micafungin  100 mg Intravenous Q24H     mycophenolate  750 mg Oral BID IS    Or     mycophenolate  750 mg Oral or NG Tube BID IS     pantoprazole  40 mg Per Feeding Tube BID     piperacillin-tazobactam  4.5 g Intravenous Q6H     sodium chloride (PF)  3 mL Intravenous Q8H     tacrolimus  2 mg Oral BID IS    Or     tacrolimus  2 mg Oral or NG Tube BID IS     valGANciclovir  450 mg Oral Daily    Or     valGANciclovir  450 mg Oral or NG Tube Daily       Physical Exam:     Admit Weight: 65.9 kg (145 lb 4.5 oz)    Current Vitals:   BP 95/51 (BP Location: Right arm)   Pulse 101   Temp 98.1  F (36.7  C)   Resp 14   Wt 80.5 kg (177 lb 7.5 oz)   SpO2 98%   BMI 29.53 kg/m      Vital sign ranges:    Temp:  [92.8  F (33.8  C)-98.2  F (36.8  C)] 98.1  F (36.7  C)  Pulse:  [] 101  Resp:  [12-16] 14  MAP:  [67 mmHg-190 mmHg] 97 mmHg  Arterial Line BP: ()/() 142/71  FiO2 (%):  [30 %-100 %] 30 %  SpO2:  [96 %-100 %] 98 %    General  Appearance: intubated/sedated  Skin: warm, dry, jaundice  Heart: NSR  Lungs: CMV P5 30%  Abdomen: abdomen soft, non-distended. Incision covered. L JUANA serosang, right darker serosang  : Negron removed  Extremities: edema: generalized +1-2  Neurologic: Sedated. Tremor absent.     Frailty Scores     Frailty Scores 12/20/2022    Final Score Frail    Final Score Number 5          Data:   CMP  Recent Labs   Lab 03/02/23  1055 03/02/23  1003 03/02/23  0956 03/02/23  0607 03/02/23  0402 03/01/23  2047 03/01/23  1955 03/01/23  0740 03/01/23  0609 02/27/23  1406 02/27/23  1057   NA  --   --  139  --  141   < > 139   < > 142   < >  --    POTASSIUM  --   --  4.7  --  4.6   < > 4.8   < > 5.7*   < >  --    CHLORIDE  --   --  103  --  103   < > 102   < > 103   < >  --    CO2  --   --  26  --  25   < > 25   < > 25   < >  --    * 88 112*   < > 113*  117*   < > 191*   < > 114*   < >  --    BUN  --   --  38.2*  --  42.2*   < > 51.8*   < > 60.2*   < >  --    CR  --   --  1.41*  --  1.47*   < > 1.53*   < > 1.67*   < >  --    GFRESTIMATED  --   --  69  --  66   < > 63   < > 56*   < >  --    SARTHAK  --   --  9.5  --  9.9   < > 9.3   < > 9.4   < >  --    ICAW  --   --   --   --  5.4*  --  5.1   < > 4.8   < >  --    MAG  --   --  2.3  --  2.2   < > 2.3   < > 2.1   < > 2.7*   PHOS  --   --  5.6*  --  5.5*   < > 6.7*   < > 6.7*   < > 3.1   AMYLASE  --   --   --   --   --   --   --   --  62  --  12*   LIPASE  --   --   --   --   --   --   --   --  19  --   --    ALBUMIN  --   --  3.3*  --  3.3*   < > 3.1*   < > 2.8*   < >  --    BILITOTAL  --   --  4.3*  --  4.6*   < >  --    < > 4.9*   < >  --    ALKPHOS  --   --  57  --  49   < >  --    < > 43   < >  --    AST  --   --  538*  --  624*   < >  --    < > 347*   < >  --    ALT  --   --  641*  --  633*   < >  --    < > 268*   < >  --     < > = values in this interval not displayed.     CBC  Recent Labs   Lab 03/02/23  0956 03/02/23  0402 02/28/23  1850 02/28/23  1703   HGB 9.5* 8.7*   < >  9.8*   WBC 7.6 5.5   < > 6.2   PLT 61* 50*   < > 94*   A1C  --   --   --  6.1*    < > = values in this interval not displayed.

## 2023-03-02 NOTE — PLAN OF CARE
Major Shift Events: Pt opening eyes but not following commands or moving extremities. Fentanyl turned off this AM. VSS. Vent settings unchanged 30% -5, failed PS trial. NJ placed and TF started at 10ml/hr, goal is 40. HD lines switched to RIJ and femoral line removed. CRRT meeting goal of 100-150/hr. Negative 1.7L since midnight. Pt anuric, bladder scanned for 15ml. Bilateral JPs with serosanguinous output. Insulin drip on alg 2. No blood products given.     Plan: Continue CRRT with plan for HD tomorrow, if machine goes down prior no plan to restart machine. Continue to wean vent as able.     For vital signs and complete assessments, please see documentation flowsheets.

## 2023-03-02 NOTE — PROGRESS NOTES
CLINICAL NUTRITION SERVICES - BRIEF NOTE   (See RD note on 2/24 for full assessment)     Reason for RD note: Initiate EN, pending confirmation of ppFt    New Findings/Chart Review:  Renal: CRRT, anuric    Respiratory: intubated    GI: LBM 2/26    Labs and meds reviewed - pressors and propofol off.  Insulin gtt    Dosing Weight: 66 kg (admit wt)    reASSESSED NUTRITION NEEDS (3/2):    Estimated Energy Needs: 2904-7973 kcals/day (30 - 35 kcals/kg)  Justification: Increased needs post op SOT x 6 - 8 weeks  Estimated Protein Needs: 100-130+ grams protein/day (1.5 - 2+  grams of pro/kg)  Justification: Increased needs, post op SOT and CRRT  Estimated Fluid Needs: 1 mL/Kcal   Justification: Maintenance needs or Per provider pending overall fluid status    Interventions:  Once FT confirmed post pyloric:  - Novasource Renal @ 40 ml/hr (960 ml) + 2 pkts prosource TF20 provides 2080 kcal (32kcal/kg), 127 g pro (1.9 gm/kg), 176 g CHO, 688 ml free water, and 0 g fiber daily.   - Initiate @ 10 ml/hr, adv by 10ml q 8 hours to goal  - FWF 30 ml q 4 hours  - Renavite daily    Future/Additional Recommendations:  - Total daily fluid adjustments and bowel regimen per team  - Monitor TF initiation / adv / tolerance     Nutrition will continue to follow per protocol.    Caren Cheatham, RD, LD, CNSC  4A SICU RD pager: [phone]  Ascom: [phone]  Weekend/Holiday RD pager [phone]

## 2023-03-02 NOTE — PROCEDURES
Small Bowel Feeding Tube Placement Assessment  Reason for Feeding Tube Placement: Administration of nutrition and medication  Cortrak Start Time:1015   Cortrak End Time: 1040  Medicine Delivered During Procedure: lidocaine gel  Placement Successful:  Yes, presumed post pyloric pending AXR confirmation     Procedure Complications:none  Final Placement Wes at exit of nare 63 cm  Face to Face time with patient: 25 minutes  Bridle Placement:   Reason for bridle placement: securement of FT   Medicine delivered during procedure: lidocaine gel   Procedure: Successful  Location of top of clip on FT: @ 65 cm marker   Condition of nose/skin at time of bridle placement: Unremarkable   Face to Face time with patient: <5 minutes.  Caren Cheatham RD, LD, CNSC  4A SICU RD pager: [phone]  Ascom: [phone]  Weekend/Holiday RD pager [phone]

## 2023-03-02 NOTE — PROGRESS NOTES
SURGICAL ICU PROGRESS NOTE  03/02/2023      PRIMARY TEAM: Transplant  PRIMARY PHYSICIAN: Dr. Sterling    REASON FOR CRITICAL CARE ADMISSION:  Hemodynamic monitoring and pressor requirements  ADMITTING PHYSICIAN: Dr. Parekh      ASSESSMENT: Dillon Salter is a 27 year old male with Asperger's, T2DM, alcohol related cirrhosis c/b hepatic encephalopathy, history of esophageal varices bleeding (5/2022, 1/2023) and rectal varices s/p sclerotherapy 1/2023, ascites presenting with hematemesis. Underwent EGD 2/11 showed oozing portal hypertensive gastropathy. He is now s/p DDLT 2/28/23 with 20L EBL. He is admitted to the SICU for post-op hemodynamic and respiratory monitoring. Requiring short term pressors, low urinary output intraoperatively with concern for possible ongoing oozing, he returned to the OR on 03/01 for washout and repair of bleeder. CRRT started on 03/01  in the setting of anuria.    Overnight  Received 2 units pRBC, 1 FFP and 1 platelets     Changes Today:  - Wean off of analgesics   - PST, plan to extubate today  - Rewire right IJ MAC to dialysis line   - Remove left femoral dialysis line   - Discontinue mIVF  - NJ placement, start TFs     Neuro/ pain/ sedation:  - Monitor neurological status. Notify the MD for any acute changes in exam.  - Discontinue fentanyl gtt for pain. Start Oxy and tylenol after NJ placement   - Propofol is off     Pulmonary care:   #Hypoxia 2/2 to fluid overload  Vent Mode: CMV/AC  (Continuous Mandatory Ventilation/ Assist Control)  FiO2 (%): 40 %  Resp Rate (Set): 12 breaths/min  Tidal Volume (Set, mL): 300 mL  PEEP (cm H2O): 5 cmH2O  Resp: 12  - Intubated 12/300/5  - Supplemental oxygen to keep saturation above 92 %.  - Incentive spirometer every 15- 30 minutes when awake.  - PST, plan to extubate today        Cardiovascular:    #Shock, hypovolemic, improving  - Monitor hemodynamic status. MAP goal >65   - Off of pressors     GI care:   #ESLD 2/2 alcoholic cirrhosis  #Hx of  esophageal varices   #Portal hypertensive gastropathy  #Esophageal ulceration  #Recurrent rectal varices  #Hx of hepatic encephalopathy   - Liver US: 7.2 x 1.8 x 7.3 right perihepatic hematoma  - Monitor LFTs  - Lactulose   - Rifaximin   - IV Protonix   - Coreg 6.25mg BID   -Hold PTA meds on bumex 1 mg daily and spironolactone 100 mg daily  - GI and transplant following  - OG tube to LIS         Renal/ Fluids/ Electrolytes/ Nutrition:   #ASHISH   - Baseline Cr <1 (Cr:1.47)  - CRRT started on 03/01 in the setting of anuria   - Current goal of I:O  - Goal of net negative 100-150 ml/hr  - Discontinue LR    - Discontinue D5 1/2 NaCl-ICU electrolyte replacement protocol  - BMP q6 hour  - Will continue to monitor intake and output  - NJ placement today, start TF. Nutrition consulted      Endocrine:    #Stress and steroid Induced hyperglycemia   #Type II Diabetes   -Hgb A1c  5.6%  -Insulin drip      ID/ Antibiotics:  - Afebrile. WBC: 5.5  - Lactate: 1.0 down from 2.2 on 03/1 down trending      #Day op Abs  - Zoyn x 48 hrs      #Tx prophylaxis   - Bactrim, Valcyte, Maritza     #Tx immunosuppression   - MMF, Tac, Pred taper      Heme:     #Acute on chronic blood loss anemia- stable  #Acute blood loss anemia  -Hemoglobin on 03/01 was 6.6 ( received 2 units pRBC, 1 FFP and 1 platelets overnight), repeat Hgb is 8.7 this AM  -Transfusion goals: Hemoglobin greater than 7, fibrinogen greater than 200, INR less than 2, platelets greater than 20 or sign/symptoms of hypoperfusion     Prophylaxis:    -Mechanical prophylaxis for DVT.   -No chemical DVT prophylaxis due to high risk of bleeding.  - PPIs      MSK:    #Chronic Pain   - PTA oxycodone   - PTA Tylenol   - PT and OT consulted. Appreciate recs.     General Cares/Prophylaxis:    DVT Prophylaxis: Pneumatic Compression Devices  GI Prophylaxis: PPI  Restraints: Restraints for medical healing needed: Not Applicable    Lines/ tubes/ drains:  - ET tube  - Left radial art line  - 2 right   IJ MAC lines  - [place] catheter   - Right Fem dailysis line  - 2 JUANA drain   - Peripheral IV    Disposition:  - Surgical ICU.  Jia Lai MS4    Patient seen and discussed with staff.    ====================================    TODAY'S SUBJECTIVE/INTERVAL HISTORY:   Intubated and sedated     OBJECTIVE:     Temp:  [92.8  F (33.8  C)-97.7  F (36.5  C)] 97  F (36.1  C)  Pulse:  [62-98] 96  Resp:  [12-27] 12  MAP:  [56 mmHg-190 mmHg] 102 mmHg  Arterial Line BP: ()/(33-90) 147/75  FiO2 (%):  [30 %-100 %] 40 %  SpO2:  [95 %-100 %] 99 %  Vent Mode: CMV/AC  (Continuous Mandatory Ventilation/ Assist Control)  FiO2 (%): 40 %  Resp Rate (Set): 12 breaths/min  Tidal Volume (Set, mL): 300 mL  PEEP (cm H2O): 5 cmH2O  Resp: 12      I/O last 3 completed shifts:  In: 03045.12 [I.V.:3677.12; Other:1; NG/GT:60; IV Piggyback:1600]  Out: 2096 [Urine:142; Emesis/NG output:200; Drains:694; Other:1060]    General/Neuro: Intubated and sedated, moderately arousable   CV: RRR  Pulm: Mechanically ventilated  Abd: soft; nondistended, incision not saturating  Extremities: no edema  Skin: warm and well-perfused.     LABS:   Arterial Blood Gases   Recent Labs   Lab 03/01/23  1423 03/01/23  1317 03/01/23  0856 03/01/23  0744   PH 7.41 7.38 7.37 7.61*   PCO2 37 42 40 22*   PO2 112* 225* 114* 202*   HCO3 23 25 23 22     Complete Blood Count   Recent Labs   Lab 03/02/23  0402 03/01/23  2359 03/01/23  1955 03/01/23  1543   WBC 5.5 5.2 3.8* 4.9   HGB 8.7* 8.7* 6.6* 7.6*   PLT 50* 49* 31* 43*     Basic Metabolic Panel  Recent Labs   Lab 03/02/23  0607 03/02/23  0402 03/02/23  0253 03/02/23  0158 03/01/23  2359 03/01/23 2047 03/01/23 1955 03/01/23  1602 03/01/23  1543   NA  --  141  --   --  141  --  139  --  140  140  140   POTASSIUM  --  4.6  --   --  4.7  --  4.8  --  5.2  5.2  5.2  5.2   CHLORIDE  --  103  --   --  103  --  102  --  103  103  103   CO2  --  25  --   --  26  --  25  --  24  24  24   BUN  --  42.2*  --   --  44.4*  --   51.8*  --  57.0*  57.0*  57.0*   CR  --  1.47*  --   --  1.47*  --  1.53*  --  1.67*  1.67*  1.67*   * 113*  117* 104*   < > 149*  152*   < > 191*   < > 170*  170*  170*    < > = values in this interval not displayed.     Liver Function Tests  Recent Labs   Lab 03/02/23  0402 03/01/23 2359 03/01/23 1955 03/01/23  1543 03/01/23  1318 03/01/23  1035   * 800*  --  920*  --  500*   * 640*  --  769*  --  402*   ALKPHOS 49 49  --  40  --  37*   BILITOTAL 4.6* 5.0*  --  5.2*  --  5.2*   ALBUMIN 3.3* 3.4* 3.1* 2.3*  2.3*  --  2.4*   INR 1.72* 1.65* 1.91* 2.10*   < >  --     < > = values in this interval not displayed.     Pancreatic Enzymes  Recent Labs   Lab 03/01/23  0609 02/27/23  1057 02/24/23  1718   LIPASE 19  --   --    AMYLASE 62 12* 14*     Coagulation Profile  Recent Labs   Lab 03/02/23 0402 03/01/23 2359 03/01/23 1955 03/01/23  1543 03/01/23  1318   INR 1.72* 1.65* 1.91* 2.10* 2.62*   PTT 33 32  --  39* 41*         IMAGING:   Recent Results (from the past 24 hour(s))   XR Abdomen Port 1 View    Narrative    Exam: XR ABDOMEN PORT 1 VIEW, 3/1/2023 10:43 AM    Indication: s/p R femoral dialysis line placement    Comparison: 2/28/2023    Findings:     Single AP portable supine view of the abdomen. Status post right  femoral dialysis line placement. Partially visualized surgical drains  and biliary drain. Negron catheter in appropriate  position.Nonobstructive bowel gas pattern. Surgical changes of liver  transplant.    No suspicious osseous lesions. Coarse tubular calcifications  projecting over left of midline hemipelvis likely represent vascular  calcifications.        Impression    Impression: Interval right femoral dialysis line placement.    I have personally reviewed the examination and initial interpretation  and I agree with the findings.    TAD WORRELL MD         SYSTEM ID:  [id number]   US Liver Transplant Follow Up    Narrative    EXAMINATION: US LIVER TRANSPLANT FOLLOW  UP, 3/1/2023 4:54 PM     COMPARISON: 2/28/2023    HISTORY: OR for hematoma evacuation    TECHNIQUE:  Gray-scale, color Doppler and spectral flow analysis.    FINDINGS:   There is no ascites. Bilateral chest effusions.    Liver:   The liver demonstrates normal homogeneous echotexture. No  evidence of a focal hepatic mass. There is a new fluid collection  anterior to the right lobe measuring 3.1 x 4.0 x 2.9 cm.    Bile Ducts: Both the intra- and extrahepatic biliary system are of  normal caliber.  The common bile duct measures 4.3 mm in diameter.    Gallbladder: The gallbladder is surgically absent.    Kidneys:   Right kidney: Poor visualization of the right kidney. 10.3 cm in long  axis dimension.  Left kidney: Poor visualization of the left kidney. 10.7 cm in long  axis dimension.    Pancreas: Is not visualized.    Spleen:  The spleen is enlarged, measuring 15.3 cm.    Visualized portions of the aorta are unremarkable.    LIVER DOPPLER:  Splenic vein:  Patent continuous normal antegrade direction flow  towards the liver, 29 cm/sec.  Extrahepatic portal vein:  Patent continuous antegrade flow, 61  cm/sec.  Portal vein at anastomosis: Patent continuous antegrade flow, 85  cm/sec.  Intrahepatic portal vein:  Patent continuous antegrade flow, 90  cm/sec.  Right portal vein flow is antegrade, measuring 49 cm/sec.  Left portal vein flow is antegrade, measuring 31 cm/sec.    Inferior vena cava: patent with flow toward the heart throughout..  IVC above anastomosis:  65 cm/sec.  IVC at anastomosis:  35 cm/sec.  Intrahepatic IVC:  50 cm/sec.  Extrahepatic IVC:  84 cm/sec.    Right, mid, left hepatic veins: Patent with flow towards the inferior  vena cava.    Extrahepatic hepatic artery: Demonstrating minimal or no diastolic  flow with flow towards the liver. 33 cm/sec with resistive index 1.00.  Right hepatic artery: 25 cm/sec with resistive index 0.60.  Left hepatic artery: 25 cm/sec with resistive index 1.0.      Impression     Impression:     1. Resolution of previously described perihepatic hematoma with new  perihepatic hematoma measuring up to 4.0 cm inferior to the right lobe  of the liver.  2. Hepatic vasculature with normal directional flow. Unchanged  elevated resistive indices in the common hepatic and right hepatic  arteries is nonspecific in the setting of recent liver transplant.  Attention on follow-up.  3. Bilateral pleural effusions.  4. Splenomegaly.    I have personally reviewed the examination and initial interpretation  and I agree with the findings.    CHULA PORTILLO MD         SYSTEM ID:  [id number]

## 2023-03-02 NOTE — PLAN OF CARE
Overnight events: Received 2 units PRBCs, 1 unit FFP, 1 unit platelets to meet transplant goals.    Neuro-arouses to painful stimuli, appropriate when arouses, wiggles toes, pupils equal and reactive, sedated on fentanyl gtt, appears comfortable  CV-sinus rhythm 90s with no ectopy, BP stable on no pressors, afebrile, hypothermic to 35.9-beau hugger applied, PA pressures 25/15, and CVP 5-9  Pulm-CMV settings minimal, diminished breath sounds throughout, minimal secretions  GI-OG to LIS with minimal bile output, hypoactive bowel sounds, no tube feedings  -sethi removed due to no urine output-will bladder scan daily to assess urine output; CRRT goal I=O-able to meet   Skin-no acute changes, JPx2 with serosanguinous output  Lines-unchanged  Gtts-fentanyl, insulin    P: continue to titrate insulin per algorithm, pressure support in the morning, continue with goals of CRRT. Notify SICU/transplant of any acute concerns/issues.

## 2023-03-02 NOTE — PROGRESS NOTES
CRRT STATUS NOTE    DATA:  Time:  615  Pressures WNL:  YES  Filter Status: WDL  Problems Reported/Alarms Noted: None  Supplies Present:  YES  ASSESSMENT:  Patient Net Fluid Balance: - 234 since 0000  Vital Signs: BP 95/51 (BP Location: Right arm)   Pulse 93   Temp 97.7  F (36.5  C) (Bladder)   Resp 12   Wt 80.5 kg (177 lb 7.5 oz)   SpO2 99%   BMI 29.53 kg/m      Labs:  K+: 4.6  Ma.2  Phos: 5.5  ICal: 5.4  Hgb 8.7, received PRBCs overnight for Hgb 6.6  Labs monitored and reviewed.   Goals of Therapy:  100-150 ml/ hr net negative.  On track to meeting goals for the day but remains volume up since admission    INTERVENTIONS:   Bags changed to 4K this AM.     PLAN  Continue to monitor.   Change CRRT set Q72hrs and PRN.   Call CRRT RN @ [phone] with questions.    See flowsheets for further details.

## 2023-03-02 NOTE — PROGRESS NOTES
CRRT STATUS NOTE    DATA:  Time:  5:45 PM  Pressures WNL:  YES  Filter Status:  WDL    Problems Reported/Alarms Noted:  None    Supplies Present:  YES    ASSESSMENT:  Patient Net Fluid Balance:  Net negative 1.5L as of MN, net Positive 15L since admission  Vital Signs:  Temp: 98.1  F (36.7  C) Temp src: Esophageal   Pulse: 100   Resp: 12 SpO2: 98 % O2 Device: Mechanical Ventilator      Labs:  Last Comprehensive Metabolic Panel:  Sodium   Date Value Ref Range Status   03/02/2023 139 136 - 145 mmol/L Final     Potassium   Date Value Ref Range Status   03/02/2023 4.9 3.4 - 5.3 mmol/L Final   02/11/2023 5.5 (H) 3.5 - 5.0 mmol/L Final     Potassium POCT   Date Value Ref Range Status   03/01/2023 4.8 3.5 - 5.0 mmol/L Final     Chloride   Date Value Ref Range Status   03/02/2023 102 98 - 107 mmol/L Final   02/11/2023 96 (L) 98 - 107 mmol/L Final     Carbon Dioxide (CO2)   Date Value Ref Range Status   03/02/2023 24 22 - 29 mmol/L Final   02/11/2023 27 22 - 31 mmol/L Final     Anion Gap   Date Value Ref Range Status   03/02/2023 13 7 - 15 mmol/L Final   02/11/2023 10 5 - 18 mmol/L Final     Glucose   Date Value Ref Range Status   03/02/2023 135 (H) 70 - 99 mg/dL Final   02/11/2023 216 (H) 70 - 125 mg/dL Final     GLUCOSE BY METER POCT   Date Value Ref Range Status   03/02/2023 129 (H) 70 - 99 mg/dL Final     Urea Nitrogen   Date Value Ref Range Status   03/02/2023 37.6 (H) 6.0 - 20.0 mg/dL Final   02/11/2023 33 (H) 8 - 22 mg/dL Final     Creatinine   Date Value Ref Range Status   03/02/2023 1.39 (H) 0.67 - 1.17 mg/dL Final     GFR Estimate   Date Value Ref Range Status   03/02/2023 70 >60 mL/min/1.73m2 Final     Comment:     eGFR calculated using 2021 CKD-EPI equation.     Calcium   Date Value Ref Range Status   03/02/2023 9.5 8.6 - 10.0 mg/dL Final      Lab Results   Component Value Date    WBC 7.8 03/02/2023     Lab Results   Component Value Date    RBC 3.16 03/02/2023     Lab Results   Component Value Date    HGB 9.7  03/02/2023     Lab Results   Component Value Date    HCT 27.6 03/02/2023     No components found for: MCT  Lab Results   Component Value Date    MCV 87 03/02/2023     Lab Results   Component Value Date    MCH 30.7 03/02/2023     Lab Results   Component Value Date    MCHC 35.1 03/02/2023     Lab Results   Component Value Date    RDW 15.9 03/02/2023     Lab Results   Component Value Date    PLT 60 03/02/2023        Goals of Therapy:  100-150 ml/hr    INTERVENTIONS:   None    PLAN:  Please do not restart if machine goes down today, will have iHD tomorrow.

## 2023-03-02 NOTE — PLAN OF CARE
Major Shift Events: Return to OR, concern for intra-abdominal bleeding.   Neuro- Sedated on 100 mcg/hr of Fentanyl. Pupils round (2-4 mm), sluggish, equal. Grimace/ arouses to pain.   CV- NSR without ectopy. HR 70-80's. Hypothermic upon return from OR, Candace hugger and CRRT warmer in use. Temp trending up. MAP goal > 65. Weaned off Levophed and Vasopressin. Palpable pulses. Post OR lab results of Hgb 7.6, Platelet count 43, INR of 2.1, and Fibrinogen of 165 reported to SICU. Gave 5 pack Cryo and 1 unit FFP per orders. CVP 6, Gave Albumin 5% per order. Palpable pulses.   Resp- Lung sounds coarse to clear   GI- Post OR AST/ ALT trended up to 920/769, SICU notified at 1755 (liver Ultrasound completed at 1654). Abdomen soft. OG to LIS with minimal bile output.   - Negron in place with < 10 mL output this shift. CRRT initiated with goal of I=O.   Skin- See flow sheets.   Mother/ family updated at bedside.   Plan: Wean sedation as able. Continue to monitor and notify MD of any changes.   For vital signs and complete assessments, please see documentation flowsheets.

## 2023-03-02 NOTE — PROGRESS NOTES
Transplant Admission Psychosocial Assessment    Patient Name: Dillon Salter  : 1995  Age: 27 year old  MRN: [id number]  Date of Initial Social Work Evaluation: 2022    Patient underwent a  donor liver transplant on 23.  I called patient's mother/primary health care agent Leticia Salter to update psychosocial assessment and provide education about SW role while inpatient, and to begin discussion of expectations/requirements, caregiver needs and follow up needs post-transplant.     Presenting Information   Living Situation: Dillon lives with his parents Wes and Leticia in a house in [place], [place].   If not local, plans for short term stay:  n/a  Previous Functional Status: Dillon requires assistance with his medication management, and his mother Leticia provides this. Dillon does not drive, and his parents provide transportation to his medical appointments.   Dillon was independent with his personal cares and ambulation prior to hospitalization.  Cultural/Language/Spiritual Considerations: Rastafarian, English    Support System  Primary Support Person: mother Leticia and father Wes  Other support:  Extended family may be able to assist if called upon  Plan for support in immediate post-transplant period: Leticia and Wes are planning to provide the recommended 24/7 care giving when patient returns home from the hospital.  They are retired, able and willing to provide care giving.    Health Care Directive  Decision Maker: patient  Alternate Decision Maker: mother Leticia is named as the primary health care agent, father Wes is named as the alternate agent  Health Care Directive: Copy in Chart    Mental Health/Coping:   History of Mental Health: From my initial assessment: Dillon has a history of depression and anxiety and he is prescribed prozac.  He denies any history of suicidal ideation or hospitalization for mental health treatment.  History of Chemical Health: From my initial  assessment: Dillon reports a history of heavy alcohol use, and he has a history of one inpatient treatment in [place], [place] back in 2018.  He relapsed approximately eighteen days after completing treatment.  Dillon acknowledges the contribution alcohol has had on his health.  He reports motivation to remain sober.  Dillon reports he has abstained from alcohol since April 7, 2022.   Dillon reports his consumption was daily, approximately twelve beers per day plus bicardi cokes and wine.  Dillon reports COVID, job loss, loneliness and gaining confidence were the reasons he drank heavily.  Dillon completed a substance use assessment as part of his liver transplant evaluation, and was recommended to engage in IOP treatment.  He started treatment with [facility] on 1/16/23.  Current status: Alcohol use disorder, in remission  Coping: unable to assess  Services Needed/Recommended: It is recommended Dillon return to intensive outpatient substance use disorder treatment when he can participate fully.    Financial   Income: Dillon receives [employer] Security Income.  Insurance and medication coverage: [employer] [facility] [employer] [facility].  He has low copays for medications.  Financial concerns: none voiced  Resources needed: ongoing assessment    Education provided by SW: Social Work role inpatient setting, availability of virtual liver transplant support groups, self-care    Assessment and recommendations and plan:    Dillon's mother Leticia is very worried about his current status, and she is having difficulty being away from the hospital. Supportive counseling provided.  I explained I would check in with her again on Monday, 3/6.    I am off tomorrow and the weekend.  If needs arise on Friday please page [phone] for coverage .  Weekend social work is available by pager. Units: 4A, 4C, & 4E Pager: [phone]           CHRISTIAN Robledo, [facility]  Liver Transplant   Phone  [phone]  Pager [phone]

## 2023-03-02 NOTE — PROGRESS NOTES
[facility]  Transplant Nephrology Consult  Date of Admission:  2/11/2023  Today's Date: 03/02/2023  Requesting physician: Thelma Sterling MD    Recommendations:  - Recommend continuing CRRT today but changed to 4K bath, increased UF to 150 cc/h.   - Plan for iHD tmrw for further UF    - Recommend switching right femoral dialysis catheter to internal jugular vein.     Assessment & Plan   # ASHISH: anuric due to hemorrhagic shock, ATN on top of HRS that was present pre txp   - Baseline Creatinine: ~ 0.9   - Proteinuria: Normal (<0.2 grams)   - Kidney Biopsy: No    -Pt developed ASHISH with Scr 1.88 on 1/23, improved to 1.22 on 2/27, increased to 1.71 on 2/28, day of transplant, likely 2/2 HRS. Now with anuric ASHISH 2/2 ATN from hypoperfusion due to hemorrhagic shock in the face of pre existing HRS. CRRT started 3/1/23   -Consent obtained from patient's mother on 3/1  - CRRT Info  Access: Temporary Catheter R fem; Blood Flow Rate: 200 ml/min; Net Fluid Removal Goal: 150 ml/hr  Prescription -  Dialysate: 12.5 ml/kg/hr with K4 bath, Pre: 12.5 ml/kg/hr with K4 bath, Post: 200 ml/hr with K4 bath      # Liver Tx: S/p OR takeback on 3/1 for bleeding and found to have an active bleeding vessel from the diaphragm.  Source has been controlled. Now hemodynamically stable    # Immunosuppression: Tacrolimus immediate release (goal 5-8), Mycophenolate mofetil (dose 750 mg every 12 hours) and Methylprednisolone (dose taper)   - Changes: Not at this time. Per transplant surgery team    # Infection Prophylaxis:   - PJP: Sulfa/TMP (Bactrim)  - CMV: Valganciclovir (Valcyte)  - Fungal: Micafungin (Mycamine)    # Blood Pressure: Controlled, improved, off pressors;  Goal BP: < 140/90 (Hospitalization goal)   - Volume status: Moderately hypervolemic  EDW ~ 70kg   - Changes: Yes - increase UF to 150cc/hr. Plan for iHD tmrw for further UF. Off pressors now. Will continue to monitor.     # Diabetes:  Controlled (HbA1c <7%) Last HbA1c: 6.1%   - Management as per primary team.    # Anemia of acute blood loss: Hgb: Stable after OR takeback for bleeding on 3/1      MARISEL: No   - Iron studies: Not checked recently    -Transfusion for Hb<7.     # Mineral Bone Disorder:   - Calcium; level: Normal        On supplement: No  - Phosphorus; level: High but trend down       On binder: No, modulate with CRRT    # Electrolytes:   - Potassium; level: High normal        On supplement: No, modulate with CRRT  - Magnesium; level: Normal        On supplement: No  - Bicarbonate; level: Normal        On supplement: No    # Hyperkalemia:    -Resolved with shifting, initiation of CRRT on 3/1   -Changed to 4K bath on 3/2 for improving K    # Lactic acidosis:   -2/2 hypoperfusion due to hemorrhagic shock. Resolved after OR takeback on 3/1 and initiation of CRRT    # Transplant History:  Tx: Liver Tx  Transplant: 2/28/2023 (Liver)  Significant changes in immunosuppression: None  Significant transplant-related complications: None    Recommendations were communicated to the primary team verbally.      HERNÁN HICKS MD  PGY 2 IM Resident    Physician Attestation   I, Anderson Jones MD, personally examined and evaluated this patient.  I discussed the patient with the resident/fellow and care team, and agree with the assessment and plan of care as documented in the note on 03/02/23 .      I personally reviewed vital signs, medications, labs and imaging.    Anderson Jones MD  Date of Service (when I saw the patient): 03/02/23          Interval history    CRRT was initiated with ultrafiltration goal of I=O and with 2K bath.  Patient was taken to the OR and was found to have an active bleeding vessel over his diaphragm.  Bleeding was controlled.  Hemoglobin is stabilized and is at 8.7 this a.m. after multiple transfusions.  Lactic acidosis has resolved and lactic acid is 0.9 this AM.  He remains off pressors and systolic blood pressure in the 130s.   Tolerated CRRT well overnight.    Patient remains intubated this AM.  Off sedatives.  Is arousable with tactile stimuli.  Spontaneous eye opening present.  Still drowsy.  Withdraws all 4 extremities to painful stimulus.    Review of Systems    Review of systems not obtained due to patient factors - intubation and sedation    Past Medical History    I have reviewed this patient's medical history and updated it with pertinent information if needed.   Past Medical History:   Diagnosis Date     Acute on chronic anemia 04/08/2022     Alcohol use disorder      Alcohol use disorder, severe, dependence (H) 07/11/2022     Alcoholic cirrhosis of liver with ascites (H)      Alcoholic hepatitis      Autism spectrum disorder 04/08/2022    High functioning autistic.  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  Past Surgical History:   Procedure Laterality Date     BENCH LIVER  2/28/2023    Procedure: Bench liver;  Surgeon: Thelma Sterling MD;  Location: [facility]     COLONOSCOPY N/A 1/27/2023    Procedure: Colonoscopy;  Surgeon: Osman Montoya MD;  Location: [facility]     ENDOSCOPIC ULTRASOUND LOWER GASTROINTESTIONAL TRACT (GI) N/A 1/27/2023    Procedure: ULTRASOUND, LOWER GI TRACT, ENDOSCOPIC with glueing;  Surgeon: Osman Montoya MD;  Location: [facility] OR     ESOPHAGOSCOPY, GASTROSCOPY, DUODENOSCOPY (EGD), COMBINED N/A 5/24/2022    Procedure: ESOPHAGOGASTRODUODENOSCOPY (EGD) with esophageal banding;  Surgeon: Aris  Gary Savage MD;  [facility]     ESOPHAGOSCOPY, GASTROSCOPY, DUODENOSCOPY (EGD), COMBINED N/A 2022    Procedure: ESOPHAGOGASTRODUODENOSCOPY;  Surgeon: Gavino Reza MD;  Location: [facility]     ESOPHAGOSCOPY, GASTROSCOPY, DUODENOSCOPY (EGD), COMBINED N/A 2023    Procedure: ESOPHAGOGASTRODUODENOSCOPY (EGD) with esophageal variceal banding;  Surgeon: Juan Boo MD;  Location: [facility] OR     ESOPHAGOSCOPY, GASTROSCOPY, DUODENOSCOPY (EGD), COMBINED N/A 2023    Procedure: ESOPHAGOGASTRODUODENOSCOPY (EGD);  Surgeon: Juan Boo MD;  Location: [facility] OR     ESOPHAGOSCOPY, GASTROSCOPY, DUODENOSCOPY (EGD), COMBINED N/A 2023    Procedure: Esophagoscopy, gastroscopy, duodenoscopy (EGD), combined;  Surgeon: Osman Montoya MD;  Location: [facility]     ESOPHAGOSCOPY, GASTROSCOPY, DUODENOSCOPY (EGD), COMBINED N/A 2023    Procedure: ESOPHAGOGASTRODUODENOSCOPY (EGD);  Surgeon: Leventhal, Thomas Michael, MD;  Location: [facility] OR     MIDLINE DOUBLE LUMEN PLACEMENT  2022          PICC TRIPLE LUMEN PLACEMENT  2022          RETURN LIVER TRANSPLANT N/A 3/1/2023    Procedure: RETURN TO OPERATING ROOM, AFTER LIVER TRANSPLANT;  Surgeon: Thelma Sterling MD;  Location: [facility] OR     TRANSPLANT LIVER RECIPIENT  DONOR N/A 2023    Procedure: Transplant liver recipient  donor;  Surgeon: Thelma Sterling MD;  Location: [facility] OR       Family History   I have reviewed this patient's family history and updated it with pertinent information if needed.   Family History   Problem Relation Age of Onset     Hypertension Mother      Substance Abuse Mother      Thyroid Disease Mother      Heart Failure Father      Substance Abuse Father      Chronic Obstructive Pulmonary Disease Father      Substance Abuse Maternal Grandfather        Social History   I have reviewed this patient's social history and updated it with pertinent information if  needed. Dillon Salter  reports that he has quit smoking. He has never used smokeless tobacco. He reports that he does not currently use alcohol. He reports that he does not currently use drugs after having used the following drugs: Marijuana.    Allergies   No Known Allergies  Prior to Admission Medications     acetaminophen  650 mg Oral Q8H     [START ON 3/5/2023] basiliximab (SIMULECT) infusion  20 mg Intravenous Once     methocarbamol  500 mg Oral 4x Daily     [START ON 3/3/2023] methylPREDNISolone  50 mg Intravenous Once    Followed by     [START ON 3/4/2023] predniSONE  25 mg Oral Once    Followed by     [START ON 3/5/2023] predniSONE  10 mg Oral Once     micafungin  100 mg Intravenous Q24H     multivitamin RENAL  1 tablet Oral Daily     mycophenolate  750 mg Oral BID IS    Or     mycophenolate  750 mg Oral or NG Tube BID IS     pantoprazole  40 mg Per Feeding Tube BID     piperacillin-tazobactam  4.5 g Intravenous Q6H     [START ON 3/6/2023] predniSONE  5 mg Oral Daily     protein modular  1 packet Per Feeding Tube BID     sodium chloride (PF)  3 mL Intravenous Q8H     tacrolimus  2 mg Oral BID IS    Or     tacrolimus  2 mg Oral or NG Tube BID IS     valGANciclovir  450 mg Oral Daily    Or     valGANciclovir  450 mg Oral or NG Tube Daily       dextrose       dextrose Stopped (23 1400)     CRRT replacement solution 12.5 mL/kg/hr (23 1130)     fentaNYL Stopped (23 0830)     insulin regular 1.5 Units/hr (23 1200)     IV fluid REPLACEMENT ONLY       - MEDICATION INSTRUCTIONS -       CRRT replacement solution 200 mL/hr at 23 0647     CRRT replacement solution 12.5 mL/kg/hr (23 1130)     BETA BLOCKER NOT PRESCRIBED         Physical Exam   Temp  Av.9  F (36.6  C)  Min: 95.5  F (35.3  C)  Max: 99.5  F (37.5  C)  Arterial Line BP  Min: 78/56  Max: 126/60  Arterial Line MAP (mmHg)  Av.4 mmHg  Min: 56 mmHg  Max: 89 mmHg      Pulse  Av.9  Min: 67  Max: 111 Resp  Avg:  16.7  Min: 10  Max: 34  FiO2 (%)  Av.7 %  Min: 30 %  Max: 50 %  SpO2  Av.4 %  Min: 84 %  Max: 100 %    CVP (mmHg): 8 mmHgBP 95/51 (BP Location: Right arm)   Pulse 106   Temp 98.2  F (36.8  C)   Resp 15   Wt 80.5 kg (177 lb 7.5 oz)   SpO2 97%   BMI 29.53 kg/m     Date 23 07 - 23 0659   Shift 7420-3046 1894-7464 9463-6776 24 Hour Total   INTAKE   I.V. 737.04   737.04   IV Piggyback 500   500   Blood Components 948   948   Shift Total(mL/kg) 2185.04(27.14)   2185.04(27.14)   OUTPUT   Urine 3   3   Emesis/NG output 100   100   Drains 10   10   Shift Total(mL/kg) 113(1.4)   113(1.4)   Weight (kg) 80.5 80.5 80.5 80.5      Admit Weight: 65.9 kg (145 lb 4.5 oz)     GENERAL APPEARANCE: intubated sedated  HENT: mouth without ulcers or lesions  LYMPHATICS: no cervical or supraclavicular nodes  CV: regular rhythm, normal rate, no rub, no murmur  EDEMA: 2+ LE edema bilaterally - improving  ABDOMEN: soft, nondistended, nontender, bowel sounds normal, surgical incision noted - no active bleeding.  MS: extremities normal - no gross deformities noted, no evidence of inflammation in joints, no muscle tenderness  SKIN: no rash  NEURO: no focal deficits, sedated  DIALYSIS ACCESS: R femoral vein    Data   CMP  Recent Labs   Lab 23  1414 23  1313 23  1202 23  1157 23  1003 23  0956 23  0607 23  0402 23  0158 23  2359   NA  --   --   --  139  --  139  --  141  --  141   POTASSIUM  --   --   --  4.9  --  4.7  --  4.6  --  4.7   CHLORIDE  --   --   --  102  --  103  --  103  --  103   CO2  --   --   --  24  --  26  --  25  --  26   ANIONGAP  --   --   --  13  --  10  --  13  --  12   * 130* 136* 135*   < > 112*   < > 113*  117*   < > 149*  152*   BUN  --   --   --  37.6*  --  38.2*  --  42.2*  --  44.4*   CR  --   --   --  1.39*  --  1.41*  --  1.47*  --  1.47*   GFRESTIMATED  --   --   --  70  --  69  --  66  --  66   SARTHAK  --   --   --  9.5   --  9.5  --  9.9  --  9.8   MAG  --   --   --  2.1  --  2.3  --  2.2  --  2.0   PHOS  --   --   --  5.7*  --  5.6*  --  5.5*  --  6.1*   PROTTOTAL  --   --   --  5.0*  --  5.0*  --  4.9*  --  5.0*   ALBUMIN  --   --   --  3.3*  --  3.3*  --  3.3*  --  3.4*   BILITOTAL  --   --   --  4.3*  --  4.3*  --  4.6*  --  5.0*   ALKPHOS  --   --   --  60  --  57  --  49  --  49   AST  --   --   --  497*  --  538*  --  624*  --  800*   ALT  --   --   --  636*  --  641*  --  633*  --  640*    < > = values in this interval not displayed.     CBC  Recent Labs   Lab 03/02/23  1157 03/02/23  0956 03/02/23 0402 03/01/23  2359   HGB 9.7* 9.5* 8.7* 8.7*   WBC 7.8 7.6 5.5 5.2   RBC 3.16* 3.14* 2.88* 2.89*   HCT 27.6* 27.4* 25.0* 25.0*   MCV 87 87 87 87   MCH 30.7 30.3 30.2 30.1   MCHC 35.1 34.7 34.8 34.8   RDW 15.9* 15.9* 14.9 14.8   PLT 60* 61* 50* 49*     INR  Recent Labs   Lab 03/02/23  1157 03/02/23  0956 03/02/23  0758 03/02/23 0402 03/01/23  2359   INR 1.72*  --  1.72* 1.72* 1.65*   PTT 34 33  --  33 32     ABG  Recent Labs   Lab 03/01/23  1423 03/01/23  1317 03/01/23  0856 03/01/23  0744   PH 7.41 7.38 7.37 7.61*   PCO2 37 42 40 22*   PO2 112* 225* 114* 202*   HCO3 23 25 23 22   O2PER 40.0 60.0 30 40      Urine Studies  Recent Labs   Lab Test 02/27/23  1616 02/24/23  1818 02/22/23  1421 02/19/23  2051   COLOR Yellow Light Yellow Yellow Yellow   APPEARANCE Clear Clear Clear Clear   URINEGLC Negative Negative Negative Negative   URINEBILI Negative Negative Negative Negative   URINEKETONE Negative Negative Negative Negative   SG 1.012 1.009 1.010 1.015   UBLD Negative Negative Negative Negative   URINEPH 5.5 5.0 5.0 5.0   PROTEIN 30* Negative Negative 30*   NITRITE Negative Negative Negative Negative   LEUKEST Negative Negative Negative Negative   RBCU <1 <1 1 26*   WBCU 2 1 2 16*     No lab results found.  PTH  No lab results found.  Iron Studies  Recent Labs   Lab Test 02/22/23  0610 02/20/23  0730 02/16/23  0543 12/20/22  0703  10/06/22  0958 04/07/22  0624   IRON 26*  --   --  249*  --  85   *  --   --   --   --   --    IRONSAT 19  --   --   --   --   --    ASCENCION 1,465* 1,335* 1,725* 2,207* 2,042* 423*       IMAGING:  All imaging studies reviewed by me.

## 2023-03-03 ENCOUNTER — APPOINTMENT (OUTPATIENT)
Dept: ULTRASOUND IMAGING | Facility: CLINIC | Age: 28
DRG: 005 | End: 2023-03-03
Attending: PHYSICIAN ASSISTANT
Payer: COMMERCIAL

## 2023-03-03 LAB
ALBUMIN SERPL BCG-MCNC: 3.2 G/DL (ref 3.5–5.2)
ALBUMIN SERPL BCG-MCNC: 3.3 G/DL (ref 3.5–5.2)
ALBUMIN SERPL BCG-MCNC: 3.7 G/DL (ref 3.5–5.2)
ALP SERPL-CCNC: 100 U/L (ref 40–129)
ALP SERPL-CCNC: 105 U/L (ref 40–129)
ALP SERPL-CCNC: 84 U/L (ref 40–129)
ALT SERPL W P-5'-P-CCNC: 426 U/L (ref 10–50)
ALT SERPL W P-5'-P-CCNC: 513 U/L (ref 10–50)
ALT SERPL W P-5'-P-CCNC: 546 U/L (ref 10–50)
AMMONIA PLAS-SCNC: 71 UMOL/L (ref 16–60)
ANION GAP SERPL CALCULATED.3IONS-SCNC: 10 MMOL/L (ref 7–15)
ANION GAP SERPL CALCULATED.3IONS-SCNC: 8 MMOL/L (ref 7–15)
ANION GAP SERPL CALCULATED.3IONS-SCNC: 9 MMOL/L (ref 7–15)
ANION GAP SERPL CALCULATED.3IONS-SCNC: 9 MMOL/L (ref 7–15)
APTT PPP: 27 SECONDS (ref 22–38)
APTT PPP: 27 SECONDS (ref 22–38)
APTT PPP: 55 SECONDS (ref 22–38)
AST SERPL W P-5'-P-CCNC: 133 U/L (ref 10–50)
AST SERPL W P-5'-P-CCNC: 177 U/L (ref 10–50)
AST SERPL W P-5'-P-CCNC: 262 U/L (ref 10–50)
BILIRUB DIRECT SERPL-MCNC: 1.71 MG/DL (ref 0–0.3)
BILIRUB SERPL-MCNC: 1.9 MG/DL
BILIRUB SERPL-MCNC: 2.2 MG/DL
BILIRUB SERPL-MCNC: 2.6 MG/DL
BUN SERPL-MCNC: 19.3 MG/DL (ref 6–20)
BUN SERPL-MCNC: 28.5 MG/DL (ref 6–20)
BUN SERPL-MCNC: 34.8 MG/DL (ref 6–20)
BUN SERPL-MCNC: 36.5 MG/DL (ref 6–20)
CA-I BLD-MCNC: 4.8 MG/DL (ref 4.4–5.2)
CALCIUM SERPL-MCNC: 8.2 MG/DL (ref 8.6–10)
CALCIUM SERPL-MCNC: 8.3 MG/DL (ref 8.6–10)
CALCIUM SERPL-MCNC: 8.4 MG/DL (ref 8.6–10)
CALCIUM SERPL-MCNC: 8.6 MG/DL (ref 8.6–10)
CHLORIDE SERPL-SCNC: 102 MMOL/L (ref 98–107)
CHLORIDE SERPL-SCNC: 103 MMOL/L (ref 98–107)
CHLORIDE SERPL-SCNC: 105 MMOL/L (ref 98–107)
CHLORIDE SERPL-SCNC: 108 MMOL/L (ref 98–107)
CREAT SERPL-MCNC: 0.85 MG/DL (ref 0.67–1.17)
CREAT SERPL-MCNC: 1.21 MG/DL (ref 0.67–1.17)
CREAT SERPL-MCNC: 1.26 MG/DL (ref 0.67–1.17)
CREAT SERPL-MCNC: 1.33 MG/DL (ref 0.67–1.17)
DEPRECATED HCO3 PLAS-SCNC: 24 MMOL/L (ref 22–29)
DEPRECATED HCO3 PLAS-SCNC: 24 MMOL/L (ref 22–29)
DEPRECATED HCO3 PLAS-SCNC: 25 MMOL/L (ref 22–29)
DEPRECATED HCO3 PLAS-SCNC: 25 MMOL/L (ref 22–29)
ERYTHROCYTE [DISTWIDTH] IN BLOOD BY AUTOMATED COUNT: 16.9 % (ref 10–15)
ERYTHROCYTE [DISTWIDTH] IN BLOOD BY AUTOMATED COUNT: 17.4 % (ref 10–15)
FIBRINOGEN PPP-MCNC: 241 MG/DL (ref 170–490)
FIBRINOGEN PPP-MCNC: 258 MG/DL (ref 170–490)
FIBRINOGEN PPP-MCNC: 284 MG/DL (ref 170–490)
GFR SERPL CREATININE-BSD FRML MDRD: 74 ML/MIN/1.73M2
GFR SERPL CREATININE-BSD FRML MDRD: 79 ML/MIN/1.73M2
GFR SERPL CREATININE-BSD FRML MDRD: 83 ML/MIN/1.73M2
GFR SERPL CREATININE-BSD FRML MDRD: >90 ML/MIN/1.73M2
GLUCOSE BLDC GLUCOMTR-MCNC: 115 MG/DL (ref 70–99)
GLUCOSE BLDC GLUCOMTR-MCNC: 125 MG/DL (ref 70–99)
GLUCOSE BLDC GLUCOMTR-MCNC: 129 MG/DL (ref 70–99)
GLUCOSE BLDC GLUCOMTR-MCNC: 138 MG/DL (ref 70–99)
GLUCOSE BLDC GLUCOMTR-MCNC: 142 MG/DL (ref 70–99)
GLUCOSE BLDC GLUCOMTR-MCNC: 142 MG/DL (ref 70–99)
GLUCOSE BLDC GLUCOMTR-MCNC: 143 MG/DL (ref 70–99)
GLUCOSE BLDC GLUCOMTR-MCNC: 146 MG/DL (ref 70–99)
GLUCOSE BLDC GLUCOMTR-MCNC: 147 MG/DL (ref 70–99)
GLUCOSE BLDC GLUCOMTR-MCNC: 149 MG/DL (ref 70–99)
GLUCOSE BLDC GLUCOMTR-MCNC: 151 MG/DL (ref 70–99)
GLUCOSE BLDC GLUCOMTR-MCNC: 152 MG/DL (ref 70–99)
GLUCOSE BLDC GLUCOMTR-MCNC: 154 MG/DL (ref 70–99)
GLUCOSE BLDC GLUCOMTR-MCNC: 155 MG/DL (ref 70–99)
GLUCOSE BLDC GLUCOMTR-MCNC: 158 MG/DL (ref 70–99)
GLUCOSE BLDC GLUCOMTR-MCNC: 175 MG/DL (ref 70–99)
GLUCOSE BLDC GLUCOMTR-MCNC: 181 MG/DL (ref 70–99)
GLUCOSE BLDC GLUCOMTR-MCNC: 183 MG/DL (ref 70–99)
GLUCOSE BLDC GLUCOMTR-MCNC: 184 MG/DL (ref 70–99)
GLUCOSE BLDC GLUCOMTR-MCNC: 187 MG/DL (ref 70–99)
GLUCOSE BLDC GLUCOMTR-MCNC: 202 MG/DL (ref 70–99)
GLUCOSE SERPL-MCNC: 140 MG/DL (ref 70–99)
GLUCOSE SERPL-MCNC: 142 MG/DL (ref 70–99)
GLUCOSE SERPL-MCNC: 144 MG/DL (ref 70–99)
GLUCOSE SERPL-MCNC: 156 MG/DL (ref 70–99)
HCT VFR BLD AUTO: 28 % (ref 40–53)
HCT VFR BLD AUTO: 29.5 % (ref 40–53)
HGB BLD-MCNC: 9.1 G/DL (ref 13.3–17.7)
HGB BLD-MCNC: 9.8 G/DL (ref 13.3–17.7)
INR PPP: 1.56 (ref 0.85–1.15)
INR PPP: 1.59 (ref 0.85–1.15)
INR PPP: 1.66 (ref 0.85–1.15)
LACTATE SERPL-SCNC: 0.7 MMOL/L (ref 0.7–2)
LACTATE SERPL-SCNC: 1 MMOL/L (ref 0.7–2)
MAGNESIUM SERPL-MCNC: 2.2 MG/DL (ref 1.7–2.3)
MAGNESIUM SERPL-MCNC: 2.4 MG/DL (ref 1.7–2.3)
MCH RBC QN AUTO: 30.2 PG (ref 26.5–33)
MCH RBC QN AUTO: 30.5 PG (ref 26.5–33)
MCHC RBC AUTO-ENTMCNC: 32.5 G/DL (ref 31.5–36.5)
MCHC RBC AUTO-ENTMCNC: 33.2 G/DL (ref 31.5–36.5)
MCV RBC AUTO: 91 FL (ref 78–100)
MCV RBC AUTO: 94 FL (ref 78–100)
PHOSPHATE SERPL-MCNC: 2.8 MG/DL (ref 2.5–4.5)
PHOSPHATE SERPL-MCNC: 3.7 MG/DL (ref 2.5–4.5)
PHOSPHATE SERPL-MCNC: 5 MG/DL (ref 2.5–4.5)
PHOSPHATE SERPL-MCNC: 5.2 MG/DL (ref 2.5–4.5)
PLATELET # BLD AUTO: 57 10E3/UL (ref 150–450)
PLATELET # BLD AUTO: 72 10E3/UL (ref 150–450)
POTASSIUM SERPL-SCNC: 3.2 MMOL/L (ref 3.4–5.3)
POTASSIUM SERPL-SCNC: 3.9 MMOL/L (ref 3.4–5.3)
POTASSIUM SERPL-SCNC: 4.6 MMOL/L (ref 3.4–5.3)
POTASSIUM SERPL-SCNC: 4.7 MMOL/L (ref 3.4–5.3)
PROT SERPL-MCNC: 4.9 G/DL (ref 6.4–8.3)
PROT SERPL-MCNC: 5 G/DL (ref 6.4–8.3)
PROT SERPL-MCNC: 5.8 G/DL (ref 6.4–8.3)
RBC # BLD AUTO: 2.98 10E6/UL (ref 4.4–5.9)
RBC # BLD AUTO: 3.24 10E6/UL (ref 4.4–5.9)
SODIUM SERPL-SCNC: 137 MMOL/L (ref 136–145)
SODIUM SERPL-SCNC: 141 MMOL/L (ref 136–145)
WBC # BLD AUTO: 12.1 10E3/UL (ref 4–11)
WBC # BLD AUTO: 13.4 10E3/UL (ref 4–11)

## 2023-03-03 PROCEDURE — 93975 VASCULAR STUDY: CPT

## 2023-03-03 PROCEDURE — 94003 VENT MGMT INPAT SUBQ DAY: CPT

## 2023-03-03 PROCEDURE — 85384 FIBRINOGEN ACTIVITY: CPT | Performed by: STUDENT IN AN ORGANIZED HEALTH CARE EDUCATION/TRAINING PROGRAM

## 2023-03-03 PROCEDURE — 250N000011 HC RX IP 250 OP 636: Performed by: NURSE PRACTITIONER

## 2023-03-03 PROCEDURE — 84450 TRANSFERASE (AST) (SGOT): CPT | Performed by: STUDENT IN AN ORGANIZED HEALTH CARE EDUCATION/TRAINING PROGRAM

## 2023-03-03 PROCEDURE — 999N000015 HC STATISTIC ARTERIAL MONITORING DAILY

## 2023-03-03 PROCEDURE — 85610 PROTHROMBIN TIME: CPT | Performed by: STUDENT IN AN ORGANIZED HEALTH CARE EDUCATION/TRAINING PROGRAM

## 2023-03-03 PROCEDURE — 99233 SBSQ HOSP IP/OBS HIGH 50: CPT | Mod: 24 | Performed by: INTERNAL MEDICINE

## 2023-03-03 PROCEDURE — 83605 ASSAY OF LACTIC ACID: CPT | Performed by: STUDENT IN AN ORGANIZED HEALTH CARE EDUCATION/TRAINING PROGRAM

## 2023-03-03 PROCEDURE — 250N000009 HC RX 250: Performed by: INTERNAL MEDICINE

## 2023-03-03 PROCEDURE — P9047 ALBUMIN (HUMAN), 25%, 50ML: HCPCS | Performed by: INTERNAL MEDICINE

## 2023-03-03 PROCEDURE — 85027 COMPLETE CBC AUTOMATED: CPT | Performed by: STUDENT IN AN ORGANIZED HEALTH CARE EDUCATION/TRAINING PROGRAM

## 2023-03-03 PROCEDURE — 999N000259 HC STATISTIC EXTUBATION

## 2023-03-03 PROCEDURE — 84100 ASSAY OF PHOSPHORUS: CPT | Performed by: INTERNAL MEDICINE

## 2023-03-03 PROCEDURE — 85730 THROMBOPLASTIN TIME PARTIAL: CPT | Performed by: STUDENT IN AN ORGANIZED HEALTH CARE EDUCATION/TRAINING PROGRAM

## 2023-03-03 PROCEDURE — 84100 ASSAY OF PHOSPHORUS: CPT | Performed by: STUDENT IN AN ORGANIZED HEALTH CARE EDUCATION/TRAINING PROGRAM

## 2023-03-03 PROCEDURE — 250N000013 HC RX MED GY IP 250 OP 250 PS 637: Performed by: STUDENT IN AN ORGANIZED HEALTH CARE EDUCATION/TRAINING PROGRAM

## 2023-03-03 PROCEDURE — 250N000011 HC RX IP 250 OP 636: Performed by: STUDENT IN AN ORGANIZED HEALTH CARE EDUCATION/TRAINING PROGRAM

## 2023-03-03 PROCEDURE — 250N000011 HC RX IP 250 OP 636

## 2023-03-03 PROCEDURE — 82248 BILIRUBIN DIRECT: CPT | Performed by: INTERNAL MEDICINE

## 2023-03-03 PROCEDURE — 80053 COMPREHEN METABOLIC PANEL: CPT | Performed by: PHYSICIAN ASSISTANT

## 2023-03-03 PROCEDURE — 999N000253 HC STATISTIC WEANING TRIALS

## 2023-03-03 PROCEDURE — 250N000009 HC RX 250: Performed by: STUDENT IN AN ORGANIZED HEALTH CARE EDUCATION/TRAINING PROGRAM

## 2023-03-03 PROCEDURE — 93975 VASCULAR STUDY: CPT | Mod: 26 | Performed by: RADIOLOGY

## 2023-03-03 PROCEDURE — 83735 ASSAY OF MAGNESIUM: CPT | Performed by: INTERNAL MEDICINE

## 2023-03-03 PROCEDURE — 250N000011 HC RX IP 250 OP 636: Performed by: SURGERY

## 2023-03-03 PROCEDURE — 258N000003 HC RX IP 258 OP 636: Performed by: NURSE PRACTITIONER

## 2023-03-03 PROCEDURE — 250N000011 HC RX IP 250 OP 636: Performed by: INTERNAL MEDICINE

## 2023-03-03 PROCEDURE — 82330 ASSAY OF CALCIUM: CPT | Performed by: INTERNAL MEDICINE

## 2023-03-03 PROCEDURE — 82140 ASSAY OF AMMONIA: CPT | Performed by: STUDENT IN AN ORGANIZED HEALTH CARE EDUCATION/TRAINING PROGRAM

## 2023-03-03 PROCEDURE — 90937 HEMODIALYSIS REPEATED EVAL: CPT

## 2023-03-03 PROCEDURE — 90947 DIALYSIS REPEATED EVAL: CPT

## 2023-03-03 PROCEDURE — 200N000002 HC R&B ICU UMMC

## 2023-03-03 PROCEDURE — 999N000157 HC STATISTIC RCP TIME EA 10 MIN

## 2023-03-03 PROCEDURE — 84155 ASSAY OF PROTEIN SERUM: CPT | Performed by: STUDENT IN AN ORGANIZED HEALTH CARE EDUCATION/TRAINING PROGRAM

## 2023-03-03 PROCEDURE — 84100 ASSAY OF PHOSPHORUS: CPT | Performed by: PHYSICIAN ASSISTANT

## 2023-03-03 PROCEDURE — 83735 ASSAY OF MAGNESIUM: CPT | Performed by: PHYSICIAN ASSISTANT

## 2023-03-03 PROCEDURE — 250N000012 HC RX MED GY IP 250 OP 636 PS 637: Performed by: SURGERY

## 2023-03-03 PROCEDURE — 5A1D70Z PERFORMANCE OF URINARY FILTRATION, INTERMITTENT, LESS THAN 6 HOURS PER DAY: ICD-10-PCS | Performed by: INTERNAL MEDICINE

## 2023-03-03 PROCEDURE — 85027 COMPLETE CBC AUTOMATED: CPT | Performed by: INTERNAL MEDICINE

## 2023-03-03 PROCEDURE — 250N000013 HC RX MED GY IP 250 OP 250 PS 637: Performed by: SURGERY

## 2023-03-03 PROCEDURE — 258N000003 HC RX IP 258 OP 636: Performed by: INTERNAL MEDICINE

## 2023-03-03 RX ORDER — VALGANCICLOVIR 450 MG/1
450 TABLET, FILM COATED ORAL
Status: DISCONTINUED | OUTPATIENT
Start: 2023-03-03 | End: 2023-03-06

## 2023-03-03 RX ORDER — VALGANCICLOVIR HYDROCHLORIDE 50 MG/ML
450 POWDER, FOR SOLUTION ORAL
Status: DISCONTINUED | OUTPATIENT
Start: 2023-03-03 | End: 2023-03-06

## 2023-03-03 RX ORDER — POLYETHYLENE GLYCOL 3350 17 G/17G
17 POWDER, FOR SOLUTION ORAL DAILY
Status: DISCONTINUED | OUTPATIENT
Start: 2023-03-03 | End: 2023-03-06 | Stop reason: ALTCHOICE

## 2023-03-03 RX ORDER — ALBUMIN (HUMAN) 12.5 G/50ML
50 SOLUTION INTRAVENOUS
Status: DISCONTINUED | OUTPATIENT
Start: 2023-03-03 | End: 2023-03-03

## 2023-03-03 RX ORDER — SENNOSIDES 8.6 MG
8.6 TABLET ORAL 2 TIMES DAILY PRN
Status: DISCONTINUED | OUTPATIENT
Start: 2023-03-03 | End: 2023-03-06 | Stop reason: ALTCHOICE

## 2023-03-03 RX ORDER — LABETALOL HYDROCHLORIDE 5 MG/ML
10-20 INJECTION, SOLUTION INTRAVENOUS EVERY 4 HOURS PRN
Status: DISCONTINUED | OUTPATIENT
Start: 2023-03-03 | End: 2023-03-15

## 2023-03-03 RX ORDER — HEPARIN SODIUM 5000 [USP'U]/.5ML
5000 INJECTION, SOLUTION INTRAVENOUS; SUBCUTANEOUS EVERY 8 HOURS
Status: DISCONTINUED | OUTPATIENT
Start: 2023-03-03 | End: 2023-03-03

## 2023-03-03 RX ADMIN — LABETALOL HYDROCHLORIDE 20 MG: 5 INJECTION, SOLUTION INTRAVENOUS at 05:05

## 2023-03-03 RX ADMIN — VALGANCICLOVIR HYDROCHLORIDE 450 MG: 50 POWDER, FOR SOLUTION ORAL at 17:55

## 2023-03-03 RX ADMIN — CALCIUM CHLORIDE, MAGNESIUM CHLORIDE, SODIUM CHLORIDE, SODIUM BICARBONATE, POTASSIUM CHLORIDE AND SODIUM PHOSPHATE DIBASIC DIHYDRATE 12.5 ML/KG/HR: 3.68; 3.05; 6.34; 3.09; .314; .187 INJECTION INTRAVENOUS at 06:52

## 2023-03-03 RX ADMIN — OXYCODONE HYDROCHLORIDE 5 MG: 5 TABLET ORAL at 15:12

## 2023-03-03 RX ADMIN — HEPARIN SODIUM 2000 UNITS: 1000 INJECTION INTRAVENOUS; SUBCUTANEOUS at 14:50

## 2023-03-03 RX ADMIN — ACETAMINOPHEN 650 MG: 325 TABLET, FILM COATED ORAL at 01:17

## 2023-03-03 RX ADMIN — SODIUM CHLORIDE 250 ML: 9 INJECTION, SOLUTION INTRAVENOUS at 12:44

## 2023-03-03 RX ADMIN — HUMAN INSULIN 6 UNITS/HR: 100 INJECTION, SOLUTION SUBCUTANEOUS at 21:52

## 2023-03-03 RX ADMIN — TACROLIMUS 2 MG: 5 CAPSULE ORAL at 17:56

## 2023-03-03 RX ADMIN — CALCIUM CHLORIDE, MAGNESIUM CHLORIDE, SODIUM CHLORIDE, SODIUM BICARBONATE, POTASSIUM CHLORIDE AND SODIUM PHOSPHATE DIBASIC DIHYDRATE 12.5 ML/KG/HR: 3.68; 3.05; 6.34; 3.09; .314; .187 INJECTION INTRAVENOUS at 02:15

## 2023-03-03 RX ADMIN — HEPARIN SODIUM 5000 UNITS: 5000 INJECTION, SOLUTION INTRAVENOUS; SUBCUTANEOUS at 12:48

## 2023-03-03 RX ADMIN — POLYETHYLENE GLYCOL 3350 17 G: 17 POWDER, FOR SOLUTION ORAL at 11:20

## 2023-03-03 RX ADMIN — HYDROMORPHONE HYDROCHLORIDE 0.2 MG: 0.2 INJECTION, SOLUTION INTRAMUSCULAR; INTRAVENOUS; SUBCUTANEOUS at 03:52

## 2023-03-03 RX ADMIN — Medication 40 MG: at 07:46

## 2023-03-03 RX ADMIN — OXYCODONE HYDROCHLORIDE 5 MG: 5 TABLET ORAL at 20:58

## 2023-03-03 RX ADMIN — METHYLPREDNISOLONE SODIUM SUCCINATE 50 MG: 125 INJECTION, POWDER, FOR SOLUTION INTRAMUSCULAR; INTRAVENOUS at 07:42

## 2023-03-03 RX ADMIN — ACETAMINOPHEN 650 MG: 325 TABLET, FILM COATED ORAL at 17:55

## 2023-03-03 RX ADMIN — MYCOPHENOLATE MOFETIL 750 MG: 200 POWDER, FOR SUSPENSION ORAL at 17:55

## 2023-03-03 RX ADMIN — METHOCARBAMOL 500 MG: 500 TABLET ORAL at 16:09

## 2023-03-03 RX ADMIN — HUMAN INSULIN 8 UNITS/HR: 100 INJECTION, SOLUTION SUBCUTANEOUS at 13:45

## 2023-03-03 RX ADMIN — MICAFUNGIN 100 MG: 10 INJECTION, POWDER, LYOPHILIZED, FOR SOLUTION INTRAVENOUS at 07:41

## 2023-03-03 RX ADMIN — MAGNESIUM HYDROXIDE 30 ML: 400 SUSPENSION ORAL at 11:20

## 2023-03-03 RX ADMIN — ACETAMINOPHEN 650 MG: 325 TABLET, FILM COATED ORAL at 08:54

## 2023-03-03 RX ADMIN — ALBUMIN HUMAN 50 ML: 0.25 SOLUTION INTRAVENOUS at 13:14

## 2023-03-03 RX ADMIN — TACROLIMUS 2 MG: 5 CAPSULE ORAL at 07:45

## 2023-03-03 RX ADMIN — SODIUM CHLORIDE 300 ML: 9 INJECTION, SOLUTION INTRAVENOUS at 12:44

## 2023-03-03 RX ADMIN — METHOCARBAMOL 500 MG: 500 TABLET ORAL at 07:46

## 2023-03-03 RX ADMIN — PROCHLORPERAZINE EDISYLATE 5 MG: 5 INJECTION INTRAMUSCULAR; INTRAVENOUS at 18:52

## 2023-03-03 RX ADMIN — OXYCODONE HYDROCHLORIDE 5 MG: 5 TABLET ORAL at 01:17

## 2023-03-03 RX ADMIN — METHOCARBAMOL 500 MG: 500 TABLET ORAL at 12:49

## 2023-03-03 RX ADMIN — B-COMPLEX W/ C & FOLIC ACID TAB 1 MG 1 TABLET: 1 TAB at 07:46

## 2023-03-03 RX ADMIN — HEPARIN SODIUM 2000 UNITS: 1000 INJECTION INTRAVENOUS; SUBCUTANEOUS at 14:48

## 2023-03-03 RX ADMIN — MYCOPHENOLATE MOFETIL 750 MG: 200 POWDER, FOR SUSPENSION ORAL at 07:45

## 2023-03-03 RX ADMIN — Medication 40 MG: at 19:52

## 2023-03-03 ASSESSMENT — ACTIVITIES OF DAILY LIVING (ADL)
ADLS_ACUITY_SCORE: 35
ADLS_ACUITY_SCORE: 41
ADLS_ACUITY_SCORE: 35
ADLS_ACUITY_SCORE: 35

## 2023-03-03 NOTE — PROGRESS NOTES
CRRT STATUS NOTE    DATA:  Time:  5:08 AM  Pressures WNL:  YES  Filter Status:  WDL    Problems Reported/Alarms Noted:  none    Supplies Present:  YES    ASSESSMENT:  Patient Net Fluid Balance:  3/2: Net -2.3L; 3/3: Net -800 mL since MN  Vital Signs:  Temp:  [96.8  F (36  C)-98.8  F (37.1  C)] 98.8  F (37.1  C)  Pulse:  [] 107  Resp:  [12-16] 14  MAP:  [89 mmHg-169 mmHg] 98 mmHg  Arterial Line BP: (127-173)/() 159/72  FiO2 (%):  [30 %] 30 %  SpO2:  [96 %-99 %] 97 %  Labs:  K 4.7, Mg 2.4, Phos 5.2, iCa 4.8, Crt 1.33  Goals of Therapy:  100-150 ml/hr    INTERVENTIONS:   none    PLAN:  Transition to iHD. Continue fluid removal as tolerated per goals of therapy. Check circuit daily and change circuit q72h and prn. Please contact CRRT resource RN at [phone] with any questions/concerns.

## 2023-03-03 NOTE — PLAN OF CARE
Goal Outcome Evaluation:      Plan of Care Reviewed With: other (see comments)SICU, RN    Overall Patient Progress: no changeOverall Patient Progress: no change    Outcome Evaluation: EN advancement / tolerance via NDT, follow for po diet once extubated

## 2023-03-03 NOTE — PLAN OF CARE
Major Shift Events: Pt opening eyes spontaneously. Following simple commands. Pupils equal/brisk. PRNs given for pain. Hypertensive this evening, PRN labetalol available. SR/ST with HR 80s- low 100s. Vent settings unchanged 30% -5. NJ with TF @30 , goal is 40. CRRT meeting goal of 100-150/hr. Pt anuric, bladder scanned for 20ml. Bilateral JPs with serosanguinous output. Insulin drip on alg 2. No blood products given.      Plan: Plan for HD today. Continue to wean vent as able, possible extubation today.      For vital signs and complete assessments, please see documentation flowsheets.

## 2023-03-03 NOTE — PROGRESS NOTES
Pt extubated without difficulty.  Pt placed on NC 4lpm with SaO2 99%.  BBS clear t/o.  Good NPC.

## 2023-03-03 NOTE — PLAN OF CARE
Status  D/I: Patient on unit 4A Surgical/Neuro ICU   Neuro- lethargic, wakes easily to voice, pupils e/r, oriented to self and hospital, needs reminding of date and situation, vocalized that he felt a bit confused, hoarse voice, somewhat difficult to understand; moves all extremities to command  Pain/Sedation- scheduled tylenol and Robaxin given, increased pain this afternoon, 5mg oxycodone given x1  CV- sinus rhythm/tachycardia to the 100's, no ectopy noted; BP stable, slightly soft during HD run, 100's/60's; afebrile; SWAN removed  Pulm- extubated at 1235, lungs clear, 1-2L nasal cannula  GI/- NJ, TF on at 40ml/hr, goal; standard flushes; CRRT stopped this morning 0815, HD run done, 2L removed; milk of mag and miralax given, LBM 2/27  Skin- coccyx wound, clamshell dressing, x2 JUANA abdominal drains, minimal serosang output, open area under pannus fold  Gtts- TKO, insulin drip--algorithm 4 + 1 unit  Labs- potassium 3.2 after HD run, spoke with SICU, waiting to replace as of now, and recheck due at 2000  Activity- not out of bed yet, PT/OT to work with patient    PLAN- UF run tomorrow or Sunday pending labs, monitor labs, treat pain as needed, notify SICU of any changes  See flow sheets for further interventions and assessments.  P: Continue to monitor pt closely, Notify MD of changes/concerns.

## 2023-03-03 NOTE — PROGRESS NOTES
[facility]  Transplant Nephrology Follow Up  Date of Admission:  2/11/2023  Today's Date: 03/03/2023  Requesting physician: Thelma Sterling MD    Recommendations:  -We will stop CRRT now.  Will initiate iHD today and plan to remove 3 L today.  -We will assess volume status over the weekend and plan for a session of isolated ultrafiltration Saturday or Sunday.   -Plan for MWF iHD until renal recovery    -Assessment & Plan   # ASHISH: anuric due to hemorrhagic shock, ATN on top of HRS that was present pre txp   - Baseline Creatinine: ~ 0.9   - Proteinuria: Normal (<0.2 grams)   - Kidney Biopsy: No    -Pt developed ASHISH with Scr 1.88 on 1/23, improved to 1.22 on 2/27, increased to 1.71 on 2/28, day of transplant, likely 2/2 HRS. Now with anuric ASHISH 2/2 ATN from hypoperfusion due to hemorrhagic shock in the face of pre existing HRS. CRRT started 3/1/23 and stopped 3/3. iHD first session today.   - HD Info  Access: Temporary Catheter RIJ, Days: MWF, Length: 3.0 hrs, EDW: TBW, Heparin: No, MARISEL: No, IV Iron: No, Vit D analog: No   -Consent obtained from patient's mother on 3/1   -Will plan for isolated UF this weekend, iHD MWF until renal recovery    # Liver Tx:    -S/p OR takeback on 3/1 for bleeding and found to have an active bleeding vessel from the diaphragm.  Source has been controlled. Now hemodynamically stable   -LFTs still elevated    # Immunosuppression: Tacrolimus immediate release (goal 5-8), Mycophenolate mofetil (dose 750 mg every 12 hours) and Methylprednisolone (dose taper)   - Changes: Not at this time. Per transplant surgery team    # Infection Prophylaxis:   - PJP: Sulfa/TMP (Bactrim)  - CMV: Valganciclovir (Valcyte)  - Fungal: Micafungin (Mycamine)    # Blood Pressure: Controlled, improved, off pressors;  Goal BP: < 140/90 (Hospitalization goal)   - Volume status: Moderately hypervolemic  EDW ~ 70kg   - Changes: Yes - Intiating iHD with UF and plan to removed  3L today. Will evaluate for isolated UF over the weekend.     # Diabetes: Controlled (HbA1c <7%) Last HbA1c: 6.1%   - Management as per primary team.    # Anemia of acute blood loss: Hgb: Stable after OR takeback for bleeding on 3/1      MARISEL: No   - Iron studies: Not checked recently    -Transfusion for Hb<7.     # Mineral Bone Disorder:   - Calcium; level: Normal        On supplement: No  - Phosphorus; level: High    On binder: No, modulate with iHD    # Electrolytes:   - Potassium; level: Normal        On supplement: No, modulate with iHD  - Magnesium; level: High        On supplement: No  - Bicarbonate; level: Normal        On supplement: No    # Hyperkalemia:    -Resolved with shifting, initiation of CRRT on 3/1    -Will modulate levels with iHD.    # Lactic acidosis:   -2/2 hypoperfusion due to hemorrhagic shock. Resolved after OR takeback on 3/1 and initiation of CRRT.    # Transplant History:  Tx: Liver Tx  Transplant: 2/28/2023 (Liver)  Significant changes in immunosuppression: None  Significant transplant-related complications: None    Recommendations were communicated to the primary team verbally.      HERNÁN HICKS MD  PGY 2 IM Resident    Physician Attestation   I, Anderson Jones MD, personally examined and evaluated this patient.  I discussed the patient with the resident/fellow and care team, and agree with the assessment and plan of care as documented in the note on 03/03/23 .      I personally reviewed vital signs, medications, labs and imaging.  Anderson Jones MD  Date of Service (when I saw the patient): 03/03/23          Interval history    Overnight, the patient tolerated CRRT well.  Status post OR takeback and successful bleeding source control.  This morning, he appears much more awake and is responsive to verbal stimuli.  He remains off pressors and systolic blood pressure in the 130s.  Remains intubated currently.  Off sedatives.     Review of Systems    Review of systems not obtained due to  patient factors - intubation and sedation    Past Medical History    I have reviewed this patient's medical history and updated it with pertinent information if needed.   Past Medical History:   Diagnosis Date     Acute on chronic anemia 04/08/2022     Alcohol use disorder      Alcohol use disorder, severe, dependence (H) 07/11/2022     Alcoholic cirrhosis of liver with ascites (H)      Alcoholic hepatitis      Autism spectrum disorder 04/08/2022    High functioning autistic.  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  Past Surgical History:   Procedure Laterality Date     BENCH LIVER  2/28/2023    Procedure: Bench liver;  Surgeon: Thelma Sterling MD;  Location: [facility] OR     COLONOSCOPY N/A 1/27/2023    Procedure: Colonoscopy;  Surgeon: Osman Montoya MD;  Location: [facility] OR     ENDOSCOPIC ULTRASOUND LOWER GASTROINTESTIONAL TRACT (GI) N/A 1/27/2023    Procedure: ULTRASOUND, LOWER GI TRACT, ENDOSCOPIC with glueing;  Surgeon: Osman Montoya MD;  Location: [facility] OR     ESOPHAGOSCOPY, GASTROSCOPY, DUODENOSCOPY (EGD), COMBINED N/A 5/24/2022    Procedure: ESOPHAGOGASTRODUODENOSCOPY (EGD) with esophageal banding;  Surgeon: Gary Hurst MD;  Location: [facility] OR     ESOPHAGOSCOPY, GASTROSCOPY, DUODENOSCOPY (EGD), COMBINED N/A 6/20/2022    Procedure: ESOPHAGOGASTRODUODENOSCOPY;  Surgeon: Gavino Reza MD;  Location: [facility]     ESOPHAGOSCOPY, GASTROSCOPY, DUODENOSCOPY  (EGD), COMBINED N/A 2023    Procedure: ESOPHAGOGASTRODUODENOSCOPY (EGD) with esophageal variceal banding;  Surgeon: Juan Boo MD;  Location: [facility]     ESOPHAGOSCOPY, GASTROSCOPY, DUODENOSCOPY (EGD), COMBINED N/A 2023    Procedure: ESOPHAGOGASTRODUODENOSCOPY (EGD);  Surgeon: Juan Boo MD;  Location: [facility]     ESOPHAGOSCOPY, GASTROSCOPY, DUODENOSCOPY (EGD), COMBINED N/A 2023    Procedure: Esophagoscopy, gastroscopy, duodenoscopy (EGD), combined;  Surgeon: Osman Montoya MD;  Location: [facility] OR     ESOPHAGOSCOPY, GASTROSCOPY, DUODENOSCOPY (EGD), COMBINED N/A 2023    Procedure: ESOPHAGOGASTRODUODENOSCOPY (EGD);  Surgeon: Leventhal, Thomas Michael, MD;  Location: [facility] OR     MIDLINE DOUBLE LUMEN PLACEMENT  2022          PICC TRIPLE LUMEN PLACEMENT  2022          RETURN LIVER TRANSPLANT N/A 3/1/2023    Procedure: RETURN TO OPERATING ROOM, AFTER LIVER TRANSPLANT;  Surgeon: Thelma Sterling MD;  Location: [facility] OR     TRANSPLANT LIVER RECIPIENT  DONOR N/A 2023    Procedure: Transplant liver recipient  donor;  Surgeon: Thelma Sterling MD;  Location: [facility] OR       Family History   I have reviewed this patient's family history and updated it with pertinent information if needed.   Family History   Problem Relation Age of Onset     Hypertension Mother      Substance Abuse Mother      Thyroid Disease Mother      Heart Failure Father      Substance Abuse Father      Chronic Obstructive Pulmonary Disease Father      Substance Abuse Maternal Grandfather        Social History   I have reviewed this patient's social history and updated it with pertinent information if needed. Dillon Salter  reports that he has quit smoking. He has never used smokeless tobacco. He reports that he does not currently use alcohol. He reports that he does not currently use drugs after having used the following drugs: Marijuana.    Allergies   No Known  Allergies  Prior to Admission Medications     sodium chloride 0.9%  250 mL Intravenous Once in dialysis/CRRT     sodium chloride 0.9%  300 mL Hemodialysis Machine Once     acetaminophen  650 mg Oral Q8H     [START ON 3/5/2023] basiliximab (SIMULECT) infusion  20 mg Intravenous Once     sodium chloride (PF) 0.9%  10 mL Intracatheter Once in dialysis/CRRT    Followed by     heparin  1.3-2.6 mL Intracatheter Once in dialysis/CRRT     sodium chloride (PF) 0.9%  10 mL Intracatheter Once in dialysis/CRRT    Followed by     heparin  1.3-2.6 mL Intracatheter Once in dialysis/CRRT     heparin ANTICOAGULANT  5,000 Units Subcutaneous Q8H     methocarbamol  500 mg Oral 4x Daily     micafungin  100 mg Intravenous Q24H     multivitamin RENAL  1 tablet Oral Daily     mycophenolate  750 mg Oral BID IS    Or     mycophenolate  750 mg Oral or NG Tube BID IS     - MEDICATION INSTRUCTIONS -   Does not apply Once     pantoprazole  40 mg Per Feeding Tube BID     polyethylene glycol  17 g Oral Daily     [START ON 3/4/2023] predniSONE  25 mg Oral Once    Followed by     [START ON 3/5/2023] predniSONE  10 mg Oral Once     [START ON 3/6/2023] predniSONE  5 mg Oral Daily     protein modular  1 packet Per Feeding Tube BID     sodium chloride (PF)  3 mL Intravenous Q8H     tacrolimus  2 mg Oral BID IS    Or     tacrolimus  2 mg Oral or NG Tube BID IS     valGANciclovir  450 mg Oral Once per day on     Or     valGANciclovir  450 mg Oral or NG Tube Once per day on        dextrose       dextrose Stopped (23 1400)     CRRT replacement solution 12.5 mL/kg/hr (23 0652)     insulin regular 5.5 Units/hr (23 1120)     IV fluid REPLACEMENT ONLY       - MEDICATION INSTRUCTIONS -       CRRT replacement solution 200 mL/hr at 23 0647     CRRT replacement solution 12.5 mL/kg/hr (23 0652)     BETA BLOCKER NOT PRESCRIBED         Physical Exam   Temp  Av.9  F (36.6  C)  Min: 95.5  F (35.3  C)  Max: 99.5  F (37.5   C)  Arterial Line BP  Min: 78/56  Max: 126/60  Arterial Line MAP (mmHg)  Av.4 mmHg  Min: 56 mmHg  Max: 89 mmHg      Pulse  Av.9  Min: 67  Max: 111 Resp  Av.7  Min: 10  Max: 34  FiO2 (%)  Av.7 %  Min: 30 %  Max: 50 %  SpO2  Av.4 %  Min: 84 %  Max: 100 %    CVP (mmHg): 8 mmHgBP 123/68   Pulse 97   Temp 97.7  F (36.5  C)   Resp (!) 8   Wt 82.8 kg (182 lb 8.7 oz)   SpO2 99%   BMI 30.38 kg/m     Date 23 07 - 23 0659   Shift 1006-4992 9574-0511 3480-2356 24 Hour Total   INTAKE   I.V. 737.04   737.04   IV Piggyback 500   500   Blood Components 948   948   Shift Total(mL/kg) 2185.04(27.14)   2185.04(27.14)   OUTPUT   Urine 3   3   Emesis/NG output 100   100   Drains 10   10   Shift Total(mL/kg) 113(1.4)   113(1.4)   Weight (kg) 80.5 80.5 80.5 80.5      Admit Weight: 65.9 kg (145 lb 4.5 oz)     GENERAL APPEARANCE: intubated sedated  HENT: mouth without ulcers or lesions  LYMPHATICS: no cervical or supraclavicular nodes  CV: regular rhythm, normal rate, no rub, no murmur  EDEMA: 2+ LE edema bilaterally  ABDOMEN: soft, nondistended, nontender, bowel sounds normal, surgical incision noted - no active bleeding.  MS: extremities normal - no gross deformities noted, no evidence of inflammation in joints, no muscle tenderness  SKIN: no rash  NEURO: no focal deficits, sedated  DIALYSIS ACCESS: R internal jugular     Data   CMP  Recent Labs   Lab 23  1200 23  1119 23  1005 23  0900 23  0359 23  0354 23  0219 23  0130 23  1951 23  1945 23  1202 23  1157 23  1003 23  0956   NA  --   --   --   --   --  137  --  141  --  144  --  139  --  139   POTASSIUM  --   --   --   --   --  4.7  --  4.6  --  3.4  --  4.9  --  4.7   CHLORIDE  --   --   --   --   --  105  --  108*  --  117*  --  102  --  103   CO2  --   --   --   --   --  24  --  24  --  18*  --  24  --  26   ANIONGAP  --   --   --   --   --  8  --  9  --   9  --  13  --  10   * 187* 202* 181*   < > 144*   < > 142*   < > 101*   < > 135*   < > 112*   BUN  --   --   --   --   --  36.5*  --  34.8*  --  26.4*  --  37.6*  --  38.2*   CR  --   --   --   --   --  1.33*  --  1.26*  --  0.98  --  1.39*  --  1.41*   GFRESTIMATED  --   --   --   --   --  74  --  79  --  >90  --  70  --  69   SARTHAK  --   --   --   --   --  8.6  --  8.4*  --  6.3*  --  9.5  --  9.5   MAG  --   --   --   --   --  2.4*  --  2.2  --  1.6*  --  2.1  --  2.3   PHOS  --   --   --   --   --  5.2*  --  5.0*  --  3.9  --  5.7*  --  5.6*   PROTTOTAL  --   --   --   --   --  5.0*  --   --   --  3.7*  --  5.0*  --  5.0*   ALBUMIN  --   --   --   --   --  3.3*  --   --   --  2.4*  --  3.3*  --  3.3*   BILITOTAL  --   --   --   --   --  2.6*  --   --   --  2.5*  --  4.3*  --  4.3*   ALKPHOS  --   --   --   --   --  84  --   --   --  51  --  60  --  57   AST  --   --   --   --   --  262*  --   --   --  264*  --  497*  --  538*   ALT  --   --   --   --   --  546*  --   --   --  422*  --  636*  --  641*    < > = values in this interval not displayed.     CBC  Recent Labs   Lab 03/03/23  0354 03/02/23  1945 03/02/23  1157 03/02/23  0956   HGB 9.8* 9.9* 9.7* 9.5*   WBC 12.1* 9.9 7.8 7.6   RBC 3.24* 3.23* 3.16* 3.14*   HCT 29.5* 29.0* 27.6* 27.4*   MCV 91 90 87 87   MCH 30.2 30.7 30.7 30.3   MCHC 33.2 34.1 35.1 34.7   RDW 16.9* 16.3* 15.9* 15.9*   PLT 72* 67* 60* 61*     INR  Recent Labs   Lab 03/03/23  0354 03/02/23  1945 03/02/23  1157 03/02/23  0956 03/02/23  0758   INR 1.66* 1.65* 1.72*  --  1.72*   PTT 27 29 34 33  --      ABG  Recent Labs   Lab 03/01/23  1423 03/01/23  1317 03/01/23  0856 03/01/23  0744   PH 7.41 7.38 7.37 7.61*   PCO2 37 42 40 22*   PO2 112* 225* 114* 202*   HCO3 23 25 23 22   O2PER 40.0 60.0 30 40      Urine Studies  Recent Labs   Lab Test 02/27/23  1616 02/24/23  1818 02/22/23  1421 02/19/23  2051   COLOR Yellow Light Yellow Yellow Yellow   APPEARANCE Clear Clear Clear Clear   URINEGLC  Negative Negative Negative Negative   URINEBILI Negative Negative Negative Negative   URINEKETONE Negative Negative Negative Negative   SG 1.012 1.009 1.010 1.015   UBLD Negative Negative Negative Negative   URINEPH 5.5 5.0 5.0 5.0   PROTEIN 30* Negative Negative 30*   NITRITE Negative Negative Negative Negative   LEUKEST Negative Negative Negative Negative   RBCU <1 <1 1 26*   WBCU 2 1 2 16*     No lab results found.  PTH  No lab results found.  Iron Studies  Recent Labs   Lab Test 02/22/23  0610 02/20/23  0730 02/16/23  0543 12/20/22  0703 10/06/22  0958 04/07/22  0624   IRON 26*  --   --  249*  --  85   *  --   --   --   --   --    IRONSAT 19  --   --   --   --   --    ASCENCION 1,465* 1,335* 1,725* 2,207* 2,042* 423*       IMAGING:  All imaging studies reviewed by me.

## 2023-03-03 NOTE — PROGRESS NOTES
SURGICAL ICU PROGRESS NOTE  03/03/2023      PRIMARY TEAM: Transplant  PRIMARY PHYSICIAN: Dr. Sterling    REASON FOR CRITICAL CARE ADMISSION:  Hemodynamic monitoring and pressor requirements  ADMITTING PHYSICIAN: Dr. Parekh      ASSESSMENT: Dillon Salter is a 27 year old male with Asperger's, T2DM, alcohol related cirrhosis c/b hepatic encephalopathy, history of esophageal varices bleeding (5/2022, 1/2023) and rectal varices s/p sclerotherapy 1/2023, ascites presenting with hematemesis. Underwent EGD 2/11 showed oozing portal hypertensive gastropathy. He is now s/p DDLT 2/28/23 with 20L EBL. He is admitted to the SICU for post-op hemodynamic and respiratory monitoring. Requiring short term pressors, low urinary output intraoperatively with concern for possible ongoing oozing, he returned to the OR on 03/01 for washout and repair of bleeder. CRRT started on 03/01  in the setting of anuria.    Overnight  Hypertensive and tachycardic, received labetalol.   Tired appearing, intermittently following commands.       Changes Today:  - PST, plan to extubate today if pt is more arousable and following commands   - Bowel Regimen   - Activity- PT/OT  - Hemodialysis   - Subcutaneous heparin for DVT ppx     Neuro/ pain/ sedation:  - Monitor neurological status. Notify the MD for any acute changes in exam.  - Scheduled tylenol, robaxin. PRN oxy and dilaudid.      Pulmonary care:   #Hypoxia 2/2 to fluid overload  Vent Mode: CMV/AC  (Continuous Mandatory Ventilation/ Assist Control)  FiO2 (%): 30 %  Resp Rate (Set): 12 breaths/min  Tidal Volume (Set, mL): 300 mL  PEEP (cm H2O): 5 cmH2O  Resp: 12  - Intubated 12/300/5  - Supplemental oxygen to keep saturation above 92 %.  - Incentive spirometer every 15- 30 minutes when awake.  - PST, plan to wean vent as able and extubate today        Cardiovascular:    #Shock, hypovolemic, improving  - Monitor hemodynamic status. MAP goal >65   - Off of pressors     GI care:   #ESLD 2/2  alcoholic cirrhosis  #Hx of esophageal varices   #Portal hypertensive gastropathy  #Esophageal ulceration  #Recurrent rectal varices  #Hx of hepatic encephalopathy   - Liver US: 7.2 x 1.8 x 7.3 right perihepatic hematoma  - Monitor LFTs all downtrending  - INR: 1.66  - Lactulose   - Rifaximin   - IV Protonix   - Coreg 6.25mg BID   -Hold PTA meds on bumex 1 mg daily and spironolactone 100 mg daily  - GI and transplant following  - OG tube to LIS         Renal/ Fluids/ Electrolytes/ Nutrition:   #ASHISH   - Baseline Cr <1 (Cr:1.33)  - CRRT started on 03/01 in the setting of anuria , planing on transitioning to hemodialysis   - Current goal of I:O  - Net fluid (3/2: net neg 2-3 L. 3/3: Net negative 800ml)  - Goal of net negative 100-150 ml/hr  - BMP q6 hour  - Will continue to monitor intake and output  - NJ placed on 03/02, TF at 30ml/hr, goal at 40ml/hr.       Endocrine:    #Stress and steroid Induced hyperglycemia   #Type II Diabetes   -Hgb A1c  5.6%  -Insulin drip      ID/ Antibiotics:  #Leukocytosis   - Afebrile. WBC: 12.1 up from 5.5 yesterday  - Lactate: 0.7 down from 1.0 on 03/2 down trending      #Day op Abs  - Zoyn x 48 hrs      #Tx prophylaxis   - Bactrim, Valcyte, Maritza     #Tx immunosuppression   - MMF, Tac, Pred taper      Heme:     #Acute on chronic blood loss anemia- stable  #Acute blood loss anemia  -Hemoglobin on 03/01 was 6.6 ( received 2 units pRBC, 1 FFP and 1 platelets ). Hgb is 9.8 stable.    - INR: 1.66  - PLT: 72k  -Transfusion goals: Hemoglobin greater than 7, fibrinogen greater than 200, INR less than 2, platelets greater than 20 or sign/symptoms of hypoperfusion     Prophylaxis:    -Mechanical prophylaxis for DVT.   -No chemical DVT prophylaxis due to high risk of bleeding.  - PPIs      MSK:    #Chronic Pain   - PTA oxycodone   - PTA Tylenol   - PT and OT consulted. Appreciate recs.     General Cares/Prophylaxis:    DVT Prophylaxis: Pneumatic Compression Devices  GI Prophylaxis:  PPI  Restraints: Restraints for medical healing needed: Not Applicable    Lines/ tubes/ drains:  - ET tube  - Left radial art line  - Right  IJ MAC lines  - Right IJ  Dialysis catheter   - [place] catheter   - 2 JUANA drain   - Peripheral IV    Disposition:  - Surgical ICU    Jia Ponce, MS4    __________________    ---------------     I was present with the medical student who participated in the service and in the documentation of the note.  I have verified the history and personally performed the physical exam and medical decision making. Addenda have been made to the note as appropriate.  I agree with the assessment and plan of care as documented in the note.    Dillon Morse MD  Surgery Resident  PGY2        Patient seen and discussed with staff.    ====================================    TODAY'S SUBJECTIVE/INTERVAL HISTORY:   Intubated and lethargic. He is able to intermittently follow commands.     OBJECTIVE:     Temp:  [97.2  F (36.2  C)-98.8  F (37.1  C)] 98.4  F (36.9  C)  Pulse:  [] 86  Resp:  [12-16] 12  MAP:  [78 mmHg-169 mmHg] 78 mmHg  Arterial Line BP: (127-173)/() 130/58  FiO2 (%):  [30 %] 30 %  SpO2:  [96 %-100 %] 100 %  Vent Mode: CMV/AC  (Continuous Mandatory Ventilation/ Assist Control)  FiO2 (%): 30 %  Resp Rate (Set): 12 breaths/min  Tidal Volume (Set, mL): 300 mL  PEEP (cm H2O): 5 cmH2O  Resp: 12      I/O last 3 completed shifts:  In: 2664.81 [I.V.:2059.81; NG/GT:225]  Out: 4735.9 [Urine:5; Emesis/NG output:50; Drains:711; Other:3969.9]    General/Neuro: Intubated, moderately arousable. Able to wiggle toes and hands.    CV: RRR  Pulm: Mechanically ventilated  Abd: soft; nondistended, incision not saturating  Extremities: no edema  Skin: warm and well-perfused.     LABS:   Arterial Blood Gases   Recent Labs   Lab 03/01/23  1423 03/01/23  1317 03/01/23  0856 03/01/23  0744   PH 7.41 7.38 7.37 7.61*   PCO2 37 42 40 22*   PO2 112* 225* 114* 202*   HCO3 23 25 23 22     Complete Blood Count    Recent Labs   Lab 03/03/23 0354 03/02/23 1945 03/02/23  1157 03/02/23  0956   WBC 12.1* 9.9 7.8 7.6   HGB 9.8* 9.9* 9.7* 9.5*   PLT 72* 67* 60* 61*     Basic Metabolic Panel  Recent Labs   Lab 03/03/23  0601 03/03/23  0359 03/03/23 0354 03/03/23 0315 03/03/23  0219 03/03/23  0130 03/02/23 1951 03/02/23 1945 03/02/23  1202 03/02/23  1157   NA  --   --  137  --   --  141  --  144  --  139   POTASSIUM  --   --  4.7  --   --  4.6  --  3.4  --  4.9   CHLORIDE  --   --  105  --   --  108*  --  117*  --  102   CO2  --   --  24  --   --  24  --  18*  --  24   BUN  --   --  36.5*  --   --  34.8*  --  26.4*  --  37.6*   CR  --   --  1.33*  --   --  1.26*  --  0.98  --  1.39*   * 146* 144* 125*   < > 142*   < > 101*   < > 135*    < > = values in this interval not displayed.     Liver Function Tests  Recent Labs   Lab 03/03/23 0354 03/02/23 1945 03/02/23  1157 03/02/23  0956 03/02/23  0758   * 264* 497* 538*  --    * 422* 636* 641*  --    ALKPHOS 84 51 60 57  --    BILITOTAL 2.6* 2.5* 4.3* 4.3*  --    ALBUMIN 3.3* 2.4* 3.3* 3.3*  --    INR 1.66* 1.65* 1.72*  --  1.72*     Pancreatic Enzymes  Recent Labs   Lab 03/01/23  0609 02/27/23  1057 02/24/23  1718   LIPASE 19  --   --    AMYLASE 62 12* 14*     Coagulation Profile  Recent Labs   Lab 03/03/23 0354 03/02/23 1945 03/02/23  1157 03/02/23  0956 03/02/23  0758   INR 1.66* 1.65* 1.72*  --  1.72*   PTT 27 29 34 33  --          IMAGING:   Recent Results (from the past 24 hour(s))   XR Chest Port 1 View    Narrative    EXAM: XR CHEST PORT 1 VIEW 3/2/2023 12:41 PM      HISTORY: new line.    COMPARISON: Chest radiograph 2/28/2023.     TECHNIQUE: Portable AP chest radiograph at 30 degrees    FINDINGS: Endotracheal tube tip approximately 2.5 cm above the ana.  Left internal jugular Remington-Ronna catheter with tip projecting toward  the origin of the left pulmonary artery. Enteric tube with tip  projecting beyond the field-of-view.  Trachea is midline.  Hazy left lower lobe pulmonary opacity obscuring  the diaphragm and costophrenic angle. Patchy opacities throughout the  right lung are similar to prior. Small bilateral pleural effusions. No  pneumothorax. The visualized upper abdomen is unremarkable.      Impression    IMPRESSION:   1. Support devices as described above.  2. Small new pleural effusions with overlying basilar  atelectasis/consolidation, left greater than right.         I have personally reviewed the examination and initial interpretation  and I agree with the findings.    DEMETRICE BLACK MD         SYSTEM ID:  [id number]   XR Abdomen Port 1 View    Narrative    EXAMINATION:  XR ABDOMEN PORT 1 VIEW 3/2/2023 12:42 PM.    COMPARISON: Radiograph 3/1/2023, 2/28/2023.    HISTORY:  Verify small bowel feeding tube bedside placement    FINDINGS:   Portable AP view of the abdomen. Feeding tube tip projects over the  distal duodenum. Nonobstructive bowel gas pattern. No pneumatosis or  portal venous gas. Stable postsurgical changes of liver  transplantation. Abdominal drains in the right upper quadrant. Biliary  drain has migrated to the left lower quadrant.      Impression    IMPRESSION:   1. Feeding tube tip projects over the distal duodenum.  2. Biliary drain has migrated to the left lower quadrant.  3. Nonobstructive bowel gas pattern.     I have personally reviewed the examination and initial interpretation  and I agree with the findings.    J CARLOS SAVAGE MD         SYSTEM ID:  [id number]

## 2023-03-03 NOTE — PROGRESS NOTES
CLINICAL NUTRITION SERVICES - REASSESSMENT NOTE     Nutrition Prescription    RECOMMENDATIONS FOR MDs/PROVIDERS TO ORDER:  Monitor for BM and need to escalate bowel regimen    Malnutrition Status:    Severe malnutrition in the context of acute on chronic illness    Recommendations already ordered by Registered Dietitian (RD):  Continue EN via NDT as ordered    Future/Additional Recommendations:  - Ongoing EN tolerance / advancement of EN via NDT  - Follow for PO diet post extubation - add ONS and start calorie counts when diet advanced beyond clears  - Post transplant diet education as able     EVALUATION OF THE PROGRESS TOWARD GOALS   Diet: NPO  Nutrition Support via NDT started 3/2, currently @ 30 ml/hr with goal: Novasource Renal @ 40 ml/hr (960 ml) + 2 pkts prosource TF20 provides 2080 kcal (32kcal/kg), 127 g pro (1.9 gm/kg), 176 g CHO, 688 ml free water, and 0 g fiber daily.     Intake: decline in po for ~ one week preop.  EN not yet at goal     NEW FINDINGS   Weight: 82.8 kg, fluid up    Labs:   Na+ 137 (WNL)  K+ 4.7 (WNL)  BUN 36.5 (H)  Cr 1.33 (H)  Mg++ 2.4 (H)  Phos 5.2 (H)  NH3 71 (H)    Meds:   Reviewed and notable for:  renavite  miralax daily  Insulin gtt    GI: last BM 2/27, team to start bowel regimen    Renal: CRRT    Respiratory:  Intubated    MALNUTRITION  % Intake: </= 50% for >/= 5 days (severe)  % Weight Loss: Unable to assess as confounded by fluid  Subcutaneous Fat Loss: Upper arm:  Mild to moderate and Lower arm:  moderate  Muscle Loss: Temporal:  Mild, Thoracic region (clavicle, acromium bone, deltoid, trapezius, pectoral):  moderate, Upper arm (bicep, tricep):  moderate, Lower arm  (forearm):  Mild to moderate.  LE difficult to assess with presence of edema  Fluid Accumulation/Edema: +2 - 3 edema  Malnutrition Diagnosis: Severe malnutrition in the context of acute on chronic illness    Previous Goals   Patient to consume % of nutritionally adequate meal trays TID, or the equivalent  with supplements/snacks  Evaluation: Not met    Previous Nutrition Diagnosis  Inadequate oral intake related to acute decline to appetite, prolonged LOS, and catabolic disease increasing nutrient needs as evidenced by pt meeting severe malnutrition criteria based on decline to intake and body habitus  Evaluation: No longer applicable, nutrition diagnosis changed below    CURRENT NUTRITION DIAGNOSIS  Inadequate oral intake related to NPO with mechanical ventilation as evidenced by reliant on EN via NDT to meet 100% needs.      INTERVENTIONS  Implementation  Collaboration with other providers - SICU     Goals  Total avg nutritional intake to meet a minimum of 30 kcal/kg and 1.5 g PRO/kg daily (per dosing wt 66 kg).    Monitoring/Evaluation  Progress toward goals will be monitored and evaluated per protocol.    Caren Cheatham, RD, LD, CNSC  4A SICU RD pager: [phone]  Ascom: [id number]  Weekend/Holiday RD pager [phone]

## 2023-03-03 NOTE — PROVIDER NOTIFICATION
SICU notified of pt having sustained hypertension above 170 systolic. No interventions ordered. Pt repositioned and given PRN pain medication. Will continue to monitor.

## 2023-03-03 NOTE — PROGRESS NOTES
Transplant Surgery  Inpatient Daily Progress Note  2023    Assessment & Plan: Dillon Salter is a 27 year old male with a past medical history of Asperger's, DM2, and alcoholic cirrhosis c/b esophageal varices and hepatic encephalopathy. He is now s/p  donor liver transplant without stent on 23 with Dr. Sterling. Return to OR for washout and repair of arterial bleeding on 3/1/23.    s/p DDLT 23: POD #3. Tbili 2.6 (2.5), AST increased today so will get US. LA 0.9.   -3/1 US: resolution of hematoma, patent vessels  -3/3 US: Patent, resolution of perihepatic hematoma with new perihepatic hematoma up to 9 cm.    Immunosuppression management:  Induction: Steroid taper and basiliximab x2 per renal sparing protocol.  Maintenance:   - mg BID  -Tacrolimus goal level 5-8.  -Prednisone 5mg daily after taper.    Neuro/Psych:  Acute post op pain; Chronic musculoskeletal pain: On oxycodone PTA. Continue Fentanyl gtt.   MDD, Anxiety, Autism spectrum: Mother is caretaker, lives with parents. Restart PTA fluoxetine as able.     Hematology:   Anemia of chronic disease/Acute blood loss: Hgb 9.8, now stable.  Thrombocytopenia: PLT 72, no need to transfuse further.  Coagulopathy: INR 1.6    Cardiorespiratory:   Post op ventilatory support: PS trials as able.  Hypovolemic/hemorrhagic shock: Secondary to bleeding. Resolved.    GI/Nutrition:   Severe malnutrition in the context of acute illness: Nutrition consulted. Recommend FT placement.    Endocrine:   DM2; Steroid induced hyperglycemia: Continue insulin gtt.     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 CRRT started 3/1/23.  Hyperkalemia: Resolved with CRRT.    : Negron removed. Check periodic bladder scans.    Infectious disease: No issues.     Prophylaxis: DVT (mechanical), fall, GI (PPI), fungal (Initially received fluconazole, change to Micafungin  d/t CRRT/re-operation), viral (Valcyte), pneumocystis (Bactrim not yet restarted, hold off with hyperkalemia), jalen-op (Zosyn)    Disposition: SICU    CHRISTA/Fellow/Resident Provider: Rosita Cohn PA-C [id number]  20 minutes spent on chart review, exam, and discussion with Nephrology.    Faculty: Thelma Sterling M.D.    __________________________________________________________________  Transplant History:    2/28/2023 (Liver), Postoperative day: 3     Interval History: Unable to obtain a history from the patient due to intubation and sedation  Overnight events: stabilized overnight.    ROS:   A 10-point review of systems was negative except as noted above.    Curent Meds:    acetaminophen  650 mg Oral Q8H     [START ON 3/5/2023] basiliximab (SIMULECT) infusion  20 mg Intravenous Once     methocarbamol  500 mg Oral 4x Daily     micafungin  100 mg Intravenous Q24H     multivitamin RENAL  1 tablet Oral Daily     mycophenolate  750 mg Oral BID IS    Or     mycophenolate  750 mg Oral or NG Tube BID IS     pantoprazole  40 mg Per Feeding Tube BID     polyethylene glycol  17 g Oral Daily     [START ON 3/4/2023] predniSONE  25 mg Oral Once    Followed by     [START ON 3/5/2023] predniSONE  10 mg Oral Once     [START ON 3/6/2023] predniSONE  5 mg Oral Daily     protein modular  1 packet Per Feeding Tube BID     sodium chloride (PF)  3 mL Intravenous Q8H     sodium chloride (PF) 0.9%  10 mL Intracatheter Once in dialysis/CRRT     sodium chloride (PF) 0.9%  10 mL Intracatheter Once in dialysis/CRRT     tacrolimus  2 mg Oral BID IS    Or     tacrolimus  2 mg Oral or NG Tube BID IS     valGANciclovir  450 mg Oral Once per day on Tue Fri    Or     valGANciclovir  450 mg Oral or NG Tube Once per day on Tue Fri       Physical Exam:     Admit Weight: 65.9 kg (145 lb 4.5 oz)    Current Vitals:   /68   Pulse 102   Temp 96.8  F (36  C) (Axillary)   Resp 12   Wt 82.8 kg (182 lb 8.7 oz)   SpO2 95%   BMI 30.38 kg/m      Vital  sign ranges:    Temp:  [96.8  F (36  C)-98.8  F (37.1  C)] 96.8  F (36  C)  Pulse:  [] 102  Resp:  [8-16] 12  BP: (123)/(68) 123/68  MAP:  [77 mmHg-115 mmHg] 94 mmHg  Arterial Line BP: ()/(57-84) 128/71  FiO2 (%):  [30 %] 30 %  SpO2:  [93 %-100 %] 95 %    General Appearance: intubated  Skin: warm, dry, jaundice  Heart: NSR  Lungs: Extubated this afternoon  Abdomen: abdomen soft, non-distended. Incision covered. L JUANA serosang, right darker serosang  : Negron removed  Extremities: edema: generalized +1-2  Neurologic: Tremor absent.     Frailty Scores     Frailty Scores 12/20/2022    Final Score Frail    Final Score Number 5          Data:   CMP  Recent Labs   Lab 03/03/23  1706 03/03/23  1615 03/03/23  1522 03/03/23  1514 03/03/23  0359 03/03/23  0354 03/03/23  0219 03/03/23  0130 03/02/23  1951 03/02/23  1945 03/01/23  0740 03/01/23  0609 02/27/23  1406 02/27/23  1057   NA  --   --   --  137  --  137  --  141  --  144   < > 142   < >  --    POTASSIUM  --   --   --  3.2*  --  4.7  --  4.6  --  3.4   < > 5.7*   < >  --    CHLORIDE  --   --   --  102  --  105  --  108*  --  117*   < > 103   < >  --    CO2  --   --   --  25  --  24  --  24  --  18*   < > 25   < >  --    * 151*   < > 156*   < > 144*   < > 142*   < > 101*   < > 114*   < >  --    BUN  --   --   --  19.3  --  36.5*  --  34.8*  --  26.4*   < > 60.2*   < >  --    CR  --   --   --  0.85  --  1.33*  --  1.26*  --  0.98   < > 1.67*   < >  --    GFRESTIMATED  --   --   --  >90  --  74  --  79  --  >90   < > 56*   < >  --    SARTHAK  --   --   --  8.2*  --  8.6  --  8.4*  --  6.3*   < > 9.4   < >  --    ICAW  --   --   --   --   --  4.8  --   --   --  5.0   < > 4.8   < >  --    MAG  --   --   --   --   --  2.4*  --  2.2  --  1.6*   < > 2.1   < > 2.7*   PHOS  --   --   --  2.8  --  5.2*  --  5.0*  --  3.9   < > 6.7*   < > 3.1   AMYLASE  --   --   --   --   --   --   --   --   --   --   --  62  --  12*   LIPASE  --   --   --   --   --   --   --   --    --   --   --  19  --   --    ALBUMIN  --   --   --  3.7  --  3.3*  --   --   --  2.4*   < > 2.8*   < >  --    BILITOTAL  --   --   --  2.2*  --  2.6*  --   --   --  2.5*   < > 4.9*   < >  --    ALKPHOS  --   --   --  105  --  84  --   --   --  51   < > 43   < >  --    AST  --   --   --  177*  --  262*  --   --   --  264*   < > 347*   < >  --    ALT  --   --   --  513*  --  546*  --   --   --  422*   < > 268*   < >  --     < > = values in this interval not displayed.     CBC  Recent Labs   Lab 03/03/23  0354 03/02/23  1945 02/28/23  1850 02/28/23  1703   HGB 9.8* 9.9*   < > 9.8*   WBC 12.1* 9.9   < > 6.2   PLT 72* 67*   < > 94*   A1C  --   --   --  6.1*    < > = values in this interval not displayed.

## 2023-03-03 NOTE — PROGRESS NOTES
HEMODIALYSIS TREATMENT NOTE    Date: 3/3/2023  Time: 1:58 PM    Data:  Pre Wt:82.8 kg (Estimated)  Desired Wt:80.5   kg   Post Wt:  80.8 kg (Estimated)  Weight change:  2 kg  Ultrafiltration - Post Run Net Total Removed (mL):  2000mL  Vascular Access Status: patent  Dialyzer Rinse: LIGHT, STREAKED   Total Blood Volume Processed:66.6 Liters  Total Dialysis (Treatment) Time:3 Hours    Lab:   NO    Interventions:  Set UF goal to 3l initially through RT jugular vein non tunneled catheter for 3 hours. At 1330 Pt dropped BP to 80s so turned off uf reduced uf goal to 2L  iv albumin given per order.Informed Nephrologist said okay.UF back on at 1345. 2L of fluid was pulled per order. Ending BP was.109/80 Rinsedback .Handoff report given to bedside nurse.       Assessment:  Pt alert. On nasal oxygen 4lits.Vitals stable.     Plan:    Per Renal team

## 2023-03-04 ENCOUNTER — APPOINTMENT (OUTPATIENT)
Dept: OCCUPATIONAL THERAPY | Facility: CLINIC | Age: 28
DRG: 005 | End: 2023-03-04
Attending: SURGERY
Payer: COMMERCIAL

## 2023-03-04 ENCOUNTER — APPOINTMENT (OUTPATIENT)
Dept: GENERAL RADIOLOGY | Facility: CLINIC | Age: 28
DRG: 005 | End: 2023-03-04
Payer: COMMERCIAL

## 2023-03-04 LAB
ALBUMIN SERPL BCG-MCNC: 3.1 G/DL (ref 3.5–5.2)
ALBUMIN SERPL BCG-MCNC: 3.2 G/DL (ref 3.5–5.2)
ALBUMIN SERPL BCG-MCNC: 3.5 G/DL (ref 3.5–5.2)
ALLEN'S TEST: NO
ALLEN'S TEST: NO
ALP SERPL-CCNC: 103 U/L (ref 40–129)
ALP SERPL-CCNC: 108 U/L (ref 40–129)
ALP SERPL-CCNC: 125 U/L (ref 40–129)
ALT SERPL W P-5'-P-CCNC: 322 U/L (ref 10–50)
ALT SERPL W P-5'-P-CCNC: 333 U/L (ref 10–50)
ALT SERPL W P-5'-P-CCNC: 388 U/L (ref 10–50)
ANION GAP SERPL CALCULATED.3IONS-SCNC: 10 MMOL/L (ref 7–15)
ANION GAP SERPL CALCULATED.3IONS-SCNC: 10 MMOL/L (ref 7–15)
ANION GAP SERPL CALCULATED.3IONS-SCNC: 12 MMOL/L (ref 7–15)
APTT PPP: 25 SECONDS (ref 22–38)
APTT PPP: 25 SECONDS (ref 22–38)
APTT PPP: 26 SECONDS (ref 22–38)
AST SERPL W P-5'-P-CCNC: 105 U/L (ref 10–50)
AST SERPL W P-5'-P-CCNC: 75 U/L (ref 10–50)
AST SERPL W P-5'-P-CCNC: 82 U/L (ref 10–50)
BASE EXCESS BLDA CALC-SCNC: 1.9 MMOL/L (ref -9–1.8)
BASE EXCESS BLDA CALC-SCNC: 2.2 MMOL/L (ref -9–1.8)
BASE EXCESS BLDV CALC-SCNC: 0.1 MMOL/L (ref -7.7–1.9)
BASE EXCESS BLDV CALC-SCNC: 0.9 MMOL/L (ref -7.7–1.9)
BASE EXCESS BLDV CALC-SCNC: 2.1 MMOL/L (ref -7.7–1.9)
BASE EXCESS BLDV CALC-SCNC: 2.3 MMOL/L (ref -7.7–1.9)
BILIRUB DIRECT SERPL-MCNC: 1.09 MG/DL (ref 0–0.3)
BILIRUB SERPL-MCNC: 1.7 MG/DL
BILIRUB SERPL-MCNC: 1.7 MG/DL
BILIRUB SERPL-MCNC: 1.9 MG/DL
BUN SERPL-MCNC: 39.4 MG/DL (ref 6–20)
BUN SERPL-MCNC: 53.5 MG/DL (ref 6–20)
BUN SERPL-MCNC: 61.2 MG/DL (ref 6–20)
CA-I BLD-MCNC: 5 MG/DL (ref 4.4–5.2)
CALCIUM SERPL-MCNC: 8.6 MG/DL (ref 8.6–10)
CALCIUM SERPL-MCNC: 8.7 MG/DL (ref 8.6–10)
CALCIUM SERPL-MCNC: 9.4 MG/DL (ref 8.6–10)
CHLORIDE SERPL-SCNC: 100 MMOL/L (ref 98–107)
CHLORIDE SERPL-SCNC: 101 MMOL/L (ref 98–107)
CHLORIDE SERPL-SCNC: 101 MMOL/L (ref 98–107)
CREAT SERPL-MCNC: 1.62 MG/DL (ref 0.67–1.17)
CREAT SERPL-MCNC: 1.87 MG/DL (ref 0.67–1.17)
CREAT SERPL-MCNC: 2.11 MG/DL (ref 0.67–1.17)
DEPRECATED HCO3 PLAS-SCNC: 24 MMOL/L (ref 22–29)
DEPRECATED HCO3 PLAS-SCNC: 24 MMOL/L (ref 22–29)
DEPRECATED HCO3 PLAS-SCNC: 25 MMOL/L (ref 22–29)
ERYTHROCYTE [DISTWIDTH] IN BLOOD BY AUTOMATED COUNT: 17.8 % (ref 10–15)
ERYTHROCYTE [DISTWIDTH] IN BLOOD BY AUTOMATED COUNT: 18.6 % (ref 10–15)
ERYTHROCYTE [DISTWIDTH] IN BLOOD BY AUTOMATED COUNT: 18.7 % (ref 10–15)
FIBRINOGEN PPP-MCNC: 254 MG/DL (ref 170–490)
FIBRINOGEN PPP-MCNC: 271 MG/DL (ref 170–490)
FIBRINOGEN PPP-MCNC: 296 MG/DL (ref 170–490)
GFR SERPL CREATININE-BSD FRML MDRD: 43 ML/MIN/1.73M2
GFR SERPL CREATININE-BSD FRML MDRD: 49 ML/MIN/1.73M2
GFR SERPL CREATININE-BSD FRML MDRD: 59 ML/MIN/1.73M2
GLUCOSE BLDC GLUCOMTR-MCNC: 100 MG/DL (ref 70–99)
GLUCOSE BLDC GLUCOMTR-MCNC: 101 MG/DL (ref 70–99)
GLUCOSE BLDC GLUCOMTR-MCNC: 105 MG/DL (ref 70–99)
GLUCOSE BLDC GLUCOMTR-MCNC: 109 MG/DL (ref 70–99)
GLUCOSE BLDC GLUCOMTR-MCNC: 114 MG/DL (ref 70–99)
GLUCOSE BLDC GLUCOMTR-MCNC: 117 MG/DL (ref 70–99)
GLUCOSE BLDC GLUCOMTR-MCNC: 118 MG/DL (ref 70–99)
GLUCOSE BLDC GLUCOMTR-MCNC: 121 MG/DL (ref 70–99)
GLUCOSE BLDC GLUCOMTR-MCNC: 122 MG/DL (ref 70–99)
GLUCOSE BLDC GLUCOMTR-MCNC: 122 MG/DL (ref 70–99)
GLUCOSE BLDC GLUCOMTR-MCNC: 127 MG/DL (ref 70–99)
GLUCOSE BLDC GLUCOMTR-MCNC: 130 MG/DL (ref 70–99)
GLUCOSE BLDC GLUCOMTR-MCNC: 146 MG/DL (ref 70–99)
GLUCOSE BLDC GLUCOMTR-MCNC: 152 MG/DL (ref 70–99)
GLUCOSE BLDC GLUCOMTR-MCNC: 154 MG/DL (ref 70–99)
GLUCOSE BLDC GLUCOMTR-MCNC: 155 MG/DL (ref 70–99)
GLUCOSE BLDC GLUCOMTR-MCNC: 158 MG/DL (ref 70–99)
GLUCOSE BLDC GLUCOMTR-MCNC: 163 MG/DL (ref 70–99)
GLUCOSE BLDC GLUCOMTR-MCNC: 174 MG/DL (ref 70–99)
GLUCOSE BLDC GLUCOMTR-MCNC: 179 MG/DL (ref 70–99)
GLUCOSE SERPL-MCNC: 102 MG/DL (ref 70–99)
GLUCOSE SERPL-MCNC: 146 MG/DL (ref 70–99)
GLUCOSE SERPL-MCNC: 175 MG/DL (ref 70–99)
HCO3 BLD-SCNC: 29 MMOL/L (ref 21–28)
HCO3 BLD-SCNC: 29 MMOL/L (ref 21–28)
HCO3 BLDV-SCNC: 27 MMOL/L (ref 21–28)
HCO3 BLDV-SCNC: 28 MMOL/L (ref 21–28)
HCO3 BLDV-SCNC: 29 MMOL/L (ref 21–28)
HCO3 BLDV-SCNC: 30 MMOL/L (ref 21–28)
HCT VFR BLD AUTO: 28.4 % (ref 40–53)
HCT VFR BLD AUTO: 28.9 % (ref 40–53)
HCT VFR BLD AUTO: 31.1 % (ref 40–53)
HGB BLD-MCNC: 9 G/DL (ref 13.3–17.7)
HGB BLD-MCNC: 9.4 G/DL (ref 13.3–17.7)
HGB BLD-MCNC: 9.9 G/DL (ref 13.3–17.7)
INR PPP: 1.32 (ref 0.85–1.15)
INR PPP: 1.38 (ref 0.85–1.15)
INR PPP: 1.45 (ref 0.85–1.15)
LACTATE SERPL-SCNC: 0.7 MMOL/L (ref 0.7–2)
LACTATE SERPL-SCNC: 1 MMOL/L (ref 0.7–2)
MCH RBC QN AUTO: 30.6 PG (ref 26.5–33)
MCH RBC QN AUTO: 30.7 PG (ref 26.5–33)
MCH RBC QN AUTO: 30.8 PG (ref 26.5–33)
MCHC RBC AUTO-ENTMCNC: 31.7 G/DL (ref 31.5–36.5)
MCHC RBC AUTO-ENTMCNC: 31.8 G/DL (ref 31.5–36.5)
MCHC RBC AUTO-ENTMCNC: 32.5 G/DL (ref 31.5–36.5)
MCV RBC AUTO: 95 FL (ref 78–100)
MCV RBC AUTO: 97 FL (ref 78–100)
MCV RBC AUTO: 97 FL (ref 78–100)
O2/TOTAL GAS SETTING VFR VENT: 2 %
O2/TOTAL GAS SETTING VFR VENT: 30 %
OXYHGB MFR BLD: 89 % (ref 92–100)
PCO2 BLD: 56 MM HG (ref 35–45)
PCO2 BLD: 61 MM HG (ref 35–45)
PCO2 BLDV: 55 MM HG (ref 40–50)
PCO2 BLDV: 56 MM HG (ref 40–50)
PCO2 BLDV: 58 MM HG (ref 40–50)
PCO2 BLDV: 64 MM HG (ref 40–50)
PH BLD: 7.3 [PH] (ref 7.35–7.45)
PH BLD: 7.32 [PH] (ref 7.35–7.45)
PH BLDV: 7.28 [PH] (ref 7.32–7.43)
PH BLDV: 7.29 [PH] (ref 7.32–7.43)
PH BLDV: 7.3 [PH] (ref 7.32–7.43)
PH BLDV: 7.33 [PH] (ref 7.32–7.43)
PHOSPHATE SERPL-MCNC: 4 MG/DL (ref 2.5–4.5)
PHOSPHATE SERPL-MCNC: 4.2 MG/DL (ref 2.5–4.5)
PHOSPHATE SERPL-MCNC: 4.8 MG/DL (ref 2.5–4.5)
PLATELET # BLD AUTO: 57 10E3/UL (ref 150–450)
PLATELET # BLD AUTO: 66 10E3/UL (ref 150–450)
PLATELET # BLD AUTO: 71 10E3/UL (ref 150–450)
PO2 BLD: 61 MM HG (ref 80–105)
PO2 BLD: 83 MM HG (ref 80–105)
PO2 BLDV: 41 MM HG (ref 25–47)
PO2 BLDV: 41 MM HG (ref 25–47)
PO2 BLDV: 44 MM HG (ref 25–47)
PO2 BLDV: 44 MM HG (ref 25–47)
POTASSIUM SERPL-SCNC: 3.7 MMOL/L (ref 3.4–5.3)
POTASSIUM SERPL-SCNC: 3.8 MMOL/L (ref 3.4–5.3)
POTASSIUM SERPL-SCNC: 3.8 MMOL/L (ref 3.4–5.3)
PROT SERPL-MCNC: 4.8 G/DL (ref 6.4–8.3)
PROT SERPL-MCNC: 5.1 G/DL (ref 6.4–8.3)
PROT SERPL-MCNC: 5.5 G/DL (ref 6.4–8.3)
RBC # BLD AUTO: 2.94 10E6/UL (ref 4.4–5.9)
RBC # BLD AUTO: 3.05 10E6/UL (ref 4.4–5.9)
RBC # BLD AUTO: 3.22 10E6/UL (ref 4.4–5.9)
SODIUM SERPL-SCNC: 134 MMOL/L (ref 136–145)
SODIUM SERPL-SCNC: 136 MMOL/L (ref 136–145)
SODIUM SERPL-SCNC: 137 MMOL/L (ref 136–145)
TACROLIMUS BLD-MCNC: 12.8 UG/L (ref 5–15)
TME LAST DOSE: NORMAL H
TME LAST DOSE: NORMAL H
WBC # BLD AUTO: 12.7 10E3/UL (ref 4–11)
WBC # BLD AUTO: 13.9 10E3/UL (ref 4–11)
WBC # BLD AUTO: 15.2 10E3/UL (ref 4–11)

## 2023-03-04 PROCEDURE — 250N000013 HC RX MED GY IP 250 OP 250 PS 637: Performed by: SURGERY

## 2023-03-04 PROCEDURE — 82248 BILIRUBIN DIRECT: CPT | Performed by: SURGERY

## 2023-03-04 PROCEDURE — 250N000011 HC RX IP 250 OP 636: Performed by: NURSE PRACTITIONER

## 2023-03-04 PROCEDURE — 80197 ASSAY OF TACROLIMUS: CPT | Performed by: SURGERY

## 2023-03-04 PROCEDURE — 84450 TRANSFERASE (AST) (SGOT): CPT | Performed by: STUDENT IN AN ORGANIZED HEALTH CARE EDUCATION/TRAINING PROGRAM

## 2023-03-04 PROCEDURE — 82330 ASSAY OF CALCIUM: CPT | Performed by: SURGERY

## 2023-03-04 PROCEDURE — 85610 PROTHROMBIN TIME: CPT | Performed by: STUDENT IN AN ORGANIZED HEALTH CARE EDUCATION/TRAINING PROGRAM

## 2023-03-04 PROCEDURE — 36600 WITHDRAWAL OF ARTERIAL BLOOD: CPT

## 2023-03-04 PROCEDURE — 85027 COMPLETE CBC AUTOMATED: CPT | Performed by: STUDENT IN AN ORGANIZED HEALTH CARE EDUCATION/TRAINING PROGRAM

## 2023-03-04 PROCEDURE — P9045 ALBUMIN (HUMAN), 5%, 250 ML: HCPCS | Performed by: INTERNAL MEDICINE

## 2023-03-04 PROCEDURE — 82803 BLOOD GASES ANY COMBINATION: CPT | Performed by: STUDENT IN AN ORGANIZED HEALTH CARE EDUCATION/TRAINING PROGRAM

## 2023-03-04 PROCEDURE — 84100 ASSAY OF PHOSPHORUS: CPT | Performed by: STUDENT IN AN ORGANIZED HEALTH CARE EDUCATION/TRAINING PROGRAM

## 2023-03-04 PROCEDURE — 250N000012 HC RX MED GY IP 250 OP 636 PS 637: Performed by: PHYSICIAN ASSISTANT

## 2023-03-04 PROCEDURE — 90937 HEMODIALYSIS REPEATED EVAL: CPT

## 2023-03-04 PROCEDURE — 5A09357 ASSISTANCE WITH RESPIRATORY VENTILATION, LESS THAN 24 CONSECUTIVE HOURS, CONTINUOUS POSITIVE AIRWAY PRESSURE: ICD-10-PCS | Performed by: STUDENT IN AN ORGANIZED HEALTH CARE EDUCATION/TRAINING PROGRAM

## 2023-03-04 PROCEDURE — 250N000013 HC RX MED GY IP 250 OP 250 PS 637

## 2023-03-04 PROCEDURE — 85384 FIBRINOGEN ACTIVITY: CPT | Performed by: STUDENT IN AN ORGANIZED HEALTH CARE EDUCATION/TRAINING PROGRAM

## 2023-03-04 PROCEDURE — 85730 THROMBOPLASTIN TIME PARTIAL: CPT | Performed by: STUDENT IN AN ORGANIZED HEALTH CARE EDUCATION/TRAINING PROGRAM

## 2023-03-04 PROCEDURE — 94660 CPAP INITIATION&MGMT: CPT

## 2023-03-04 PROCEDURE — 71045 X-RAY EXAM CHEST 1 VIEW: CPT

## 2023-03-04 PROCEDURE — 200N000002 HC R&B ICU UMMC

## 2023-03-04 PROCEDURE — 83605 ASSAY OF LACTIC ACID: CPT | Performed by: STUDENT IN AN ORGANIZED HEALTH CARE EDUCATION/TRAINING PROGRAM

## 2023-03-04 PROCEDURE — 258N000003 HC RX IP 258 OP 636: Performed by: INTERNAL MEDICINE

## 2023-03-04 PROCEDURE — 71045 X-RAY EXAM CHEST 1 VIEW: CPT | Mod: 26 | Performed by: RADIOLOGY

## 2023-03-04 PROCEDURE — 84155 ASSAY OF PROTEIN SERUM: CPT | Performed by: STUDENT IN AN ORGANIZED HEALTH CARE EDUCATION/TRAINING PROGRAM

## 2023-03-04 PROCEDURE — 250N000009 HC RX 250: Performed by: STUDENT IN AN ORGANIZED HEALTH CARE EDUCATION/TRAINING PROGRAM

## 2023-03-04 PROCEDURE — 250N000011 HC RX IP 250 OP 636: Performed by: INTERNAL MEDICINE

## 2023-03-04 PROCEDURE — 258N000003 HC RX IP 258 OP 636: Performed by: NURSE PRACTITIONER

## 2023-03-04 PROCEDURE — 82805 BLOOD GASES W/O2 SATURATION: CPT | Performed by: STUDENT IN AN ORGANIZED HEALTH CARE EDUCATION/TRAINING PROGRAM

## 2023-03-04 PROCEDURE — 97166 OT EVAL MOD COMPLEX 45 MIN: CPT | Mod: GO

## 2023-03-04 PROCEDURE — 999N000157 HC STATISTIC RCP TIME EA 10 MIN

## 2023-03-04 PROCEDURE — 99233 SBSQ HOSP IP/OBS HIGH 50: CPT | Mod: 24 | Performed by: INTERNAL MEDICINE

## 2023-03-04 PROCEDURE — 999N000015 HC STATISTIC ARTERIAL MONITORING DAILY

## 2023-03-04 PROCEDURE — 250N000013 HC RX MED GY IP 250 OP 250 PS 637: Performed by: STUDENT IN AN ORGANIZED HEALTH CARE EDUCATION/TRAINING PROGRAM

## 2023-03-04 PROCEDURE — 80053 COMPREHEN METABOLIC PANEL: CPT | Performed by: STUDENT IN AN ORGANIZED HEALTH CARE EDUCATION/TRAINING PROGRAM

## 2023-03-04 PROCEDURE — 97530 THERAPEUTIC ACTIVITIES: CPT | Mod: GO

## 2023-03-04 PROCEDURE — 82803 BLOOD GASES ANY COMBINATION: CPT

## 2023-03-04 PROCEDURE — 250N000012 HC RX MED GY IP 250 OP 636 PS 637: Performed by: SURGERY

## 2023-03-04 RX ORDER — ALBUMIN (HUMAN) 12.5 G/50ML
50 SOLUTION INTRAVENOUS
Status: DISCONTINUED | OUTPATIENT
Start: 2023-03-04 | End: 2023-03-04

## 2023-03-04 RX ORDER — ASPIRIN 325 MG
325 TABLET ORAL DAILY
Status: DISCONTINUED | OUTPATIENT
Start: 2023-03-04 | End: 2023-03-19 | Stop reason: HOSPADM

## 2023-03-04 RX ORDER — TACROLIMUS 1 MG/1
1 CAPSULE ORAL
Status: DISCONTINUED | OUTPATIENT
Start: 2023-03-04 | End: 2023-03-08

## 2023-03-04 RX ADMIN — TACROLIMUS 2 MG: 5 CAPSULE ORAL at 08:08

## 2023-03-04 RX ADMIN — FLUOXETINE HYDROCHLORIDE 60 MG: 40 CAPSULE ORAL at 22:06

## 2023-03-04 RX ADMIN — OXYCODONE HYDROCHLORIDE 2.5 MG: 5 TABLET ORAL at 20:36

## 2023-03-04 RX ADMIN — TACROLIMUS 1 MG: 5 CAPSULE ORAL at 17:49

## 2023-03-04 RX ADMIN — HEPARIN SODIUM 1400 UNITS: 1000 INJECTION INTRAVENOUS; SUBCUTANEOUS at 15:53

## 2023-03-04 RX ADMIN — PREDNISONE 25 MG: 20 TABLET ORAL at 08:07

## 2023-03-04 RX ADMIN — ACETAMINOPHEN 650 MG: 325 TABLET, FILM COATED ORAL at 01:06

## 2023-03-04 RX ADMIN — Medication 40 MG: at 19:40

## 2023-03-04 RX ADMIN — SODIUM CHLORIDE 300 ML: 9 INJECTION, SOLUTION INTRAVENOUS at 15:50

## 2023-03-04 RX ADMIN — ALBUMIN HUMAN 250 ML: 0.05 INJECTION, SOLUTION INTRAVENOUS at 15:51

## 2023-03-04 RX ADMIN — OXYCODONE HYDROCHLORIDE 2.5 MG: 5 TABLET ORAL at 16:36

## 2023-03-04 RX ADMIN — HUMAN INSULIN 6 UNITS/HR: 100 INJECTION, SOLUTION SUBCUTANEOUS at 12:21

## 2023-03-04 RX ADMIN — MYCOPHENOLATE MOFETIL 750 MG: 200 POWDER, FOR SUSPENSION ORAL at 08:08

## 2023-03-04 RX ADMIN — Medication: at 15:52

## 2023-03-04 RX ADMIN — ASPIRIN 325 MG ORAL TABLET 325 MG: 325 PILL ORAL at 12:21

## 2023-03-04 RX ADMIN — ACETAMINOPHEN 650 MG: 325 TABLET, FILM COATED ORAL at 09:38

## 2023-03-04 RX ADMIN — ACETAMINOPHEN 650 MG: 325 TABLET, FILM COATED ORAL at 17:47

## 2023-03-04 RX ADMIN — B-COMPLEX W/ C & FOLIC ACID TAB 1 MG 1 TABLET: 1 TAB at 08:07

## 2023-03-04 RX ADMIN — Medication 40 MG: at 08:08

## 2023-03-04 RX ADMIN — HEPARIN SODIUM 1400 UNITS: 1000 INJECTION INTRAVENOUS; SUBCUTANEOUS at 15:55

## 2023-03-04 RX ADMIN — MICAFUNGIN 100 MG: 10 INJECTION, POWDER, LYOPHILIZED, FOR SOLUTION INTRAVENOUS at 08:04

## 2023-03-04 RX ADMIN — MYCOPHENOLATE MOFETIL 750 MG: 200 POWDER, FOR SUSPENSION ORAL at 17:47

## 2023-03-04 RX ADMIN — OXYCODONE HYDROCHLORIDE 5 MG: 5 TABLET ORAL at 01:06

## 2023-03-04 ASSESSMENT — ACTIVITIES OF DAILY LIVING (ADL)
ADLS_ACUITY_SCORE: 41
ADLS_ACUITY_SCORE: 37
ADLS_ACUITY_SCORE: 41
ADLS_ACUITY_SCORE: 41
ADLS_ACUITY_SCORE: 37
ADLS_ACUITY_SCORE: 41
ADLS_ACUITY_SCORE: 37
ADLS_ACUITY_SCORE: 41
ADLS_ACUITY_SCORE: 41

## 2023-03-04 NOTE — PROGRESS NOTES
23 1055   Appointment Info   Signing Clinician's Name / Credentials (OT) Ragini Red OTR/L   Rehab Comments (OT) Re-eval, abdominal precautions   Living Environment   People in Home parent(s)   Current Living Arrangements house   Home Accessibility stairs within home;stairs to enter home   Number of Stairs, Main Entrance 2   Stair Railings, Main Entrance railings on both sides of stairs   Number of Stairs, Within Home, Primary eight  (split entry 6+6)   Stair Railings, Within Home, Primary railing on right side (ascending)   Transportation Anticipated family or friend will provide   Living Environment Comments Pt has walkin shower, standard toilets. No DME or ADs   Self-Care   Usual Activity Tolerance good   Current Activity Tolerance poor   Regular Exercise No   Equipment Currently Used at Home none   Fall history within last six months no   Number of times patient has fallen within last six months 2   Activity/Exercise/Self-Care Comment Pt and family report pt independent w/ ADLs and mobility at baseline   Instrumental Activities of Daily Living (IADL)   IADL Comments Family performs most IADLs   General Information   Onset of Illness/Injury or Date of Surgery 23   Referring Physician Dillon Morse MD   Patient/Family Therapy Goal Statement (OT) None stated   Additional Occupational Profile Info/Pertinent History of Current Problem Dillon Salter is a 27 year old male with a past medical history of Asperger's, DM2, and alcoholic cirrhosis c/b esophageal varices and hepatic encephalopathy. He is now s/p  donor liver transplant without stent on 23 with Dr. Sterling. Return to OR for washout and repair of arterial bleeding on 3/1/23.   Existing Precautions/Restrictions fall;abdominal   Limitations/Impairments safety/cognitive   Left Upper Extremity (Weight-bearing Status) partial weight-bearing (PWB)  (10#)   Right Upper Extremity (Weight-bearing Status) partial weight-bearing  (PWB)  (10#)   Cognitive Status Examination   Orientation Status orientation to person, place and time   Affect/Mental Status (Cognitive) low arousal/lethargic;confused   Follows Commands delayed response/completion;increased processing time needed;follows two-step commands   Cognitive Status Comments Pt lethargic, appears confused, will continue to monitor   Visual Perception   Visual Impairment/Limitations WFL;corrective lenses for distance   Sensory   Sensory Quick Adds sensation intact   Posture   Posture forward head position   Range of Motion Comprehensive   General Range of Motion bilateral upper extremity ROM WFL   Strength Comprehensive (MMT)   Comment, General Manual Muscle Testing (MMT) Assessment Generalized deconditioning   Coordination   Coordination Comments [place]coe hand weakness   Bed Mobility   Bed Mobility supine-sit   Supine-Sit Reagan (Bed Mobility) maximum assist (25% patient effort);2 person assist   Transfers   Transfers sit-stand transfer;bed-chair transfer   Transfer Skill: Bed to Chair/Chair to Bed   Bed-Chair Reagan (Transfers) moderate assist (50% patient effort);2 person assist   Sit-Stand Transfer   Sit-Stand Reagan (Transfers) moderate assist (50% patient effort);2 person assist   Activities of Daily Living   BADL Assessment/Intervention bathing;lower body dressing;upper body dressing;grooming;toileting   Bathing Assessment/Intervention   [place] Level (Bathing) maximum assist (25% patient effort)   Comment, (Bathing) Per clinical judgment   Upper Body Dressing Assessment/Training   Comment, (Upper Body Dressing) Per clinical judgment   [place] Level (Upper Body Dressing) moderate assist (50% patient effort)   Lower Body Dressing Assessment/Training   [place] Level (Lower Body Dressing) maximum assist (25% patient effort)   Comment, (Lower Body Dressing) Per clinical judgment   Grooming Assessment/Training   [place] Level (Grooming) set up;moderate  assist (50% patient effort)   Comment, (Grooming) Per clinical judgment   Toileting   Comment, (Toileting) Per clinical judgment   [place] Level (Toileting) maximum assist (25% patient effort)   Clinical Impression   Criteria for Skilled Therapeutic Interventions Met (OT) Yes, treatment indicated   OT Diagnosis Decreased ADL/IADL I   OT Problem List-Impairments impacting ADL problems related to;activity tolerance impaired;cognition;fear & anxiety;strength;pain;post-surgical precautions   Assessment of Occupational Performance 5 or more Performance Deficits   Identified Performance Deficits Dressing, bathing, toileting, g/h, home mgmt, functional mobility/transfers   Planned Therapy Interventions (OT) ADL retraining;IADL retraining;cognition;fine motor coordination training;strengthening;transfer training;progressive activity/exercise;home program guidelines;risk factor education   Clinical Decision Making Complexity (OT) moderate complexity   Anticipated Equipment Needs Upon Discharge (OT) other (see comments)  (TBD)   Risk & Benefits of therapy have been explained evaluation/treatment results reviewed;care plan/treatment goals reviewed;risks/benefits reviewed;current/potential barriers reviewed;participants voiced agreement with care plan;participants included;patient;mother   Clinical Impression Comments Pt will benefit from skilled OT services to progress IND w/ ADLs/IADLs and facilitate return to PLOF   OT Total Evaluation Time   OT Eval, Moderate Complexity Minutes (76325) 10   OT Goals   Therapy Frequency (OT) 6 times/wk   OT: Hygiene/Grooming modified independent;using adaptive equipment;within precautions;while standing   OT: Lower Body Dressing Modified independent;using adaptive equipment;within precautions;including set-up/clothing retrieval   OT: Toilet Transfer/Toileting Supervision/stand-by assist;toilet transfer;cleaning and garment management;using adaptive equipment;within precautions   OT:  Cognitive Patient/caregiver will verbalize understanding of cognitive assessment results/recommendations as needed for safe discharge planning   Therapeutic Activities   Therapeutic Activity Minutes (30360) 24   Symptoms noted during/after treatment fatigue;shortness of breath   Treatment Detail/Skilled Intervention OT: Greeted pt supine in bed on BiPAP (primarily for gas exchange), RN ok'd doffing and present throughout session. Pt lethargic at session start, cues to keep eyes open. Improving throughout session and bereket w/ upright activity. Time needed for line mgmt and room setup. Facilitated functional transfers to promote IND w/ ADLs. Pt rolling L and R for dependent jalen cares and placement of bedpan w/ mod Ax2. See above for initial assessment of mobility. Facilitated 2x additional STS from EOB and recliner w/ mod Ax2, cues for hip and neck ext. Following, pt washing face w/ min A, significant UE weakness noted. Min A to change channels on TV. Educated pt on increasing OOB activity as able w/ RN assist and trying to stay awake during day to decrease delirium and promote normal sleep/wake cycle. Left pt up in chair w/ call light in reach and all immediate needs met. VSS on 2L NC.   OT Discharge Planning   OT Plan Precautions education, commode tx, seated ADLs, standing tolerance, hand/UE ex   OT Discharge Recommendation (DC Rec) [facility]-Motivated patient will benefit from intensive, interdisciplinary therapy.  Anticipate will be able to tolerate 3 hours of therapy per day   OT Rationale for DC Rec Pt presents significantly below baseline, limtied by post-op precautions, weakness, deconditioning and cognitive deficits. Pt currently requiring Ax2 for functional mobility and mod-max A for BADLs. Pt previously IND and has good family support, anticipate pt would make a good ARU candidate.   OT Brief overview of current status Mod Ax2   Total Session Time   Timed Code Treatment Minutes 24   Total Session  Time (sum of timed and untimed services) 34

## 2023-03-04 NOTE — PROGRESS NOTES
[facility]  Transplant Nephrology Follow Up  Date of Admission:  2/11/2023  Today's Date: 03/04/2023  Requesting physician: Thelma Sterling MD    Recommendations:  -Plan for isolated UF 2-3L today with albumin prime  -Plan for MWF iHD until renal recovery    -Assessment & Plan   # ASHISH: anuric due to hemorrhagic shock, ATN on top of HRS that was present pre txp   - Baseline Creatinine: ~ 0.9   - Proteinuria: Normal (<0.2 grams)   - Kidney Biopsy: No    -Pt developed ASHISH with Scr 1.88 on 1/23, improved to 1.22 on 2/27, increased to 1.71 on 2/28, day of transplant, likely 2/2 HRS. Now with anuric ASHISH 2/2 ATN from hypoperfusion due to hemorrhagic shock in the face of pre existing HRS. CRRT started 3/1/23 and stopped 3/3. iHD 3/3.   - HD Info  Access: Temporary Catheter RIJ, Days: MWF, Length: 3.0 hrs, EDW: TBW, Heparin: No, MARISEL: No, IV Iron: No, Vit D analog: No   -Consent obtained from patient's mother on 3/1   -Will plan for isolated UF today 2-3L, iHD MWF until renal recovery    # Liver Tx:    -S/p OR takeback on 3/1 for bleeding and found to have an active bleeding vessel from the diaphragm.  Source has been controlled. Now hemodynamically stable   -LFTs downtrending    # Immunosuppression: Tacrolimus immediate release (goal 5-8), Mycophenolate mofetil (dose 750 mg every 12 hours) and Methylprednisolone (dose taper)   - Changes: Not at this time. Per transplant surgery team    # Infection Prophylaxis:   - PJP: Sulfa/TMP (Bactrim)  - CMV: Valganciclovir (Valcyte)  - Fungal: Micafungin (Mycamine)    # Blood Pressure: Controlled, improved, off pressors;  Goal BP: < 140/90 (Hospitalization goal)   - Volume status: Mildly hypervolemic  EDW ~ 70kg   - Changes: Yes - isolated UF today with goal UF 2-3L    # Diabetes: Controlled (HbA1c <7%) Last HbA1c: 6.1%   - Management as per primary team.    # Anemia of acute blood loss: Hgb: Stable after OR takeback for bleeding on 3/1       MARISEL: No   - Iron studies: Not checked recently    -Transfusion for Hb<7.     # Mineral Bone Disorder:   - Calcium; level: Normal        On supplement: No  - Phosphorus; level: High    On binder: No, modulate with iHD    # Electrolytes:   - Potassium; level: Normal        On supplement: No  - Magnesium; level: High        On supplement: No  - Bicarbonate; level: Normal        On supplement: No    # Hyperkalemia:    -Resolved with shifting, initiation of CRRT on 3/1    -Will modulate levels with iHD.    # Lactic acidosis:   -2/2 hypoperfusion due to hemorrhagic shock. Resolved after OR takeback on 3/1 and initiation of CRRT.    # Transplant History:  Tx: Liver Tx  Transplant: 2/28/2023 (Liver)  Significant changes in immunosuppression: None  Significant transplant-related complications: None    Recommendations were communicated to the primary team verbally.      Anderson Jones MD  Pager: [phone]    Interval history    Became hypotensive during iHD yesterday. Elevated pCO2 now after extubation and on BiPap. Plan for isolated UF today.     Review of Systems    Review of systems not obtained due to patient factors -  Sedation and Bipap    Past Medical History    I have reviewed this patient's medical history and updated it with pertinent information if needed.   Past Medical History:   Diagnosis Date     Acute on chronic anemia 04/08/2022     Alcohol use disorder      Alcohol use disorder, severe, dependence (H) 07/11/2022     Alcoholic cirrhosis of liver with ascites (H)      Alcoholic hepatitis      Autism spectrum disorder 04/08/2022    High functioning autistic.  26-year-old history of autism/Asperger's on the spectrum graduated high school at [employer] ([facility] [employer]) worked at [employer] and then another  place until the Covid epidemic in 2020 lives with parents (Leticia and Wes) socially isolated drinks alcohol beer daily      Benign essential hypertension      Bleeding esophageal varices (H) 06/18/2022     Hepatic  encephalopathy      Hyponatremia 07/28/2022     Major depressive disorder      Type II Diabetes        Past Surgical History   I have reviewed this patient's surgical history and updated it with pertinent information if needed.  Past Surgical History:   Procedure Laterality Date     BENCH LIVER  2/28/2023    Procedure: Bench liver;  Surgeon: Thelma Sterling MD;  Location: [facility] OR     COLONOSCOPY N/A 1/27/2023    Procedure: Colonoscopy;  Surgeon: Osman Montoya MD;  Location: [facility] OR     ENDOSCOPIC ULTRASOUND LOWER GASTROINTESTIONAL TRACT (GI) N/A 1/27/2023    Procedure: ULTRASOUND, LOWER GI TRACT, ENDOSCOPIC with glueing;  Surgeon: Osman Montoya MD;  Location: [facility] OR     ESOPHAGOSCOPY, GASTROSCOPY, DUODENOSCOPY (EGD), COMBINED N/A 5/24/2022    Procedure: ESOPHAGOGASTRODUODENOSCOPY (EGD) with esophageal banding;  Surgeon: Gary Hurst MD;  Location: [facility] Main OR     ESOPHAGOSCOPY, GASTROSCOPY, DUODENOSCOPY (EGD), COMBINED N/A 6/20/2022    Procedure: ESOPHAGOGASTRODUODENOSCOPY;  Surgeon: Gavino Reza MD;  Location: [facility] Main OR     ESOPHAGOSCOPY, GASTROSCOPY, DUODENOSCOPY (EGD), COMBINED N/A 1/23/2023    Procedure: ESOPHAGOGASTRODUODENOSCOPY (EGD) with esophageal variceal banding;  Surgeon: Juan Boo MD;  Location: [facility] Main OR     ESOPHAGOSCOPY, GASTROSCOPY, DUODENOSCOPY (EGD), COMBINED N/A 1/25/2023    Procedure: ESOPHAGOGASTRODUODENOSCOPY (EGD);  Surgeon: Juan Boo MD;  Location: [facility] Main OR     ESOPHAGOSCOPY, GASTROSCOPY, DUODENOSCOPY (EGD), COMBINED N/A 1/27/2023    Procedure: Esophagoscopy, gastroscopy, duodenoscopy (EGD), combined;  Surgeon: Osman Montoya MD;  Location: [facility] OR     ESOPHAGOSCOPY, GASTROSCOPY, DUODENOSCOPY (EGD), COMBINED N/A 2/12/2023    Procedure: ESOPHAGOGASTRODUODENOSCOPY (EGD);  Surgeon: Leventhal, Thomas Michael, MD;  Location: [facility] OR     MIDLINE DOUBLE LUMEN PLACEMENT  5/26/2022          PICC TRIPLE LUMEN PLACEMENT  7/28/2022           RETURN LIVER TRANSPLANT N/A 3/1/2023    Procedure: RETURN TO OPERATING ROOM, AFTER LIVER TRANSPLANT;  Surgeon: Thelma Sterling MD;  Location: [facility] OR     TRANSPLANT LIVER RECIPIENT  DONOR N/A 2023    Procedure: Transplant liver recipient  donor;  Surgeon: Thelma Sterling MD;  Location: [facility] OR       Family History   I have reviewed this patient's family history and updated it with pertinent information if needed.   Family History   Problem Relation Age of Onset     Hypertension Mother      Substance Abuse Mother      Thyroid Disease Mother      Heart Failure Father      Substance Abuse Father      Chronic Obstructive Pulmonary Disease Father      Substance Abuse Maternal Grandfather        Social History   I have reviewed this patient's social history and updated it with pertinent information if needed. Dillon Salter  reports that he has quit smoking. He has never used smokeless tobacco. He reports that he does not currently use alcohol. He reports that he does not currently use drugs after having used the following drugs: Marijuana.    Allergies   No Known Allergies  Prior to Admission Medications     sodium chloride 0.9%  300 mL Hemodialysis Machine Once     acetaminophen  650 mg Oral Q8H     albumin human  250 mL Intravenous Once in dialysis/CRRT     [START ON 3/5/2023] basiliximab (SIMULECT) infusion  20 mg Intravenous Once     sodium chloride (PF) 0.9%  10 mL Intracatheter Once in dialysis/CRRT    Followed by     heparin  1.3-2.6 mL Intracatheter Once in dialysis/CRRT     sodium chloride (PF) 0.9%  10 mL Intracatheter Once in dialysis/CRRT    Followed by     heparin  1.3-2.6 mL Intracatheter Once in dialysis/CRRT     methocarbamol  500 mg Oral 4x Daily     micafungin  100 mg Intravenous Q24H     multivitamin RENAL  1 tablet Oral Daily     mycophenolate  750 mg Oral BID IS    Or     mycophenolate  750 mg Oral or NG Tube BID IS     - MEDICATION INSTRUCTIONS -   Does not apply Once      pantoprazole  40 mg Per Feeding Tube BID     polyethylene glycol  17 g Oral Daily     [START ON 3/5/2023] predniSONE  10 mg Oral Once     [START ON 3/6/2023] predniSONE  5 mg Oral Daily     protein modular  1 packet Per Feeding Tube BID     sodium chloride (PF)  3 mL Intravenous Q8H     tacrolimus  2 mg Oral BID IS    Or     tacrolimus  2 mg Oral or NG Tube BID IS     valGANciclovir  450 mg Oral Once per day on     Or     valGANciclovir  450 mg Oral or NG Tube Once per day on        dextrose       dextrose Stopped (23 1400)     insulin regular 4 Units/hr (23 1054)     IV fluid REPLACEMENT ONLY       BETA BLOCKER NOT PRESCRIBED         Physical Exam   Temp  Av.9  F (36.6  C)  Min: 95.5  F (35.3  C)  Max: 99.5  F (37.5  C)  Arterial Line BP  Min: 78/56  Max: 126/60  Arterial Line MAP (mmHg)  Av.4 mmHg  Min: 56 mmHg  Max: 89 mmHg      Pulse  Av.9  Min: 67  Max: 111 Resp  Av.7  Min: 10  Max: 34  FiO2 (%)  Av.7 %  Min: 30 %  Max: 50 %  SpO2  Av.4 %  Min: 84 %  Max: 100 %    CVP (mmHg): 8 mmHgBP 125/68   Pulse 96   Temp 97.2  F (36.2  C) (Axillary)   Resp 11   Wt 82.8 kg (182 lb 8.7 oz)   SpO2 97%   BMI 30.38 kg/m     Date 23 0700 - 23 0659   Shift 4849-9495 0733-4601 9310-7600 24 Hour Total   INTAKE   I.V. 737.04   737.04   IV Piggyback 500   500   Blood Components 948   948   Shift Total(mL/kg) 2185.04(27.14)   2185.04(27.14)   OUTPUT   Urine 3   3   Emesis/NG output 100   100   Drains 10   10   Shift Total(mL/kg) 113(1.4)   113(1.4)   Weight (kg) 80.5 80.5 80.5 80.5      Admit Weight: 65.9 kg (145 lb 4.5 oz)     GENERAL APPEARANCE: sedated but awake, on bipap  HENT: mouth without ulcers or lesions  LYMPHATICS: no cervical or supraclavicular nodes  Pulm: CTAB. On bipap  CV: regular rhythm, normal rate, no rub, no murmur  EDEMA: trace LE edema bilaterally  ABDOMEN: soft, nondistended, nontender, bowel sounds normal, surgical incision noted - no  active bleeding.  MS: extremities normal - no gross deformities noted, no evidence of inflammation in joints, no muscle tenderness  SKIN: no rash  NEURO: no focal deficits  DIALYSIS ACCESS: R internal jugular     Data   CMP  Recent Labs   Lab 03/04/23  1053 03/04/23  0954 03/04/23  0850 03/04/23  0752 03/04/23  0342 03/04/23  0339 03/03/23 2001 03/03/23  1955 03/03/23  1522 03/03/23  1514 03/03/23  0359 03/03/23  0354 03/03/23  0219 03/03/23  0130 03/02/23  1951 03/02/23  1945 03/02/23  1202 03/02/23  1157   NA  --   --   --   --   --  136  --  137  --  137  --  137  --  141  --  144  --  139   POTASSIUM  --   --   --   --   --  3.8  --  3.9  --  3.2*  --  4.7  --  4.6  --  3.4  --  4.9   CHLORIDE  --   --   --   --   --  101  --  103  --  102  --  105  --  108*  --  117*  --  102   CO2  --   --   --   --   --  25  --  25  --  25  --  24  --  24  --  18*  --  24   ANIONGAP  --   --   --   --   --  10  --  9  --  10  --  8  --  9  --  9  --  13   * 163* 152* 118*   < > 102*   < > 140*   < > 156*   < > 144*   < > 142*   < > 101*   < > 135*   BUN  --   --   --   --   --  39.4*  --  28.5*  --  19.3  --  36.5*  --  34.8*  --  26.4*  --  37.6*   CR  --   --   --   --   --  1.62*  --  1.21*  --  0.85  --  1.33*  --  1.26*  --  0.98  --  1.39*   GFRESTIMATED  --   --   --   --   --  59*  --  83  --  >90  --  74  --  79  --  >90  --  70   SARTHAK  --   --   --   --   --  8.6  --  8.3*  --  8.2*  --  8.6  --  8.4*  --  6.3*  --  9.5   MAG  --   --   --   --   --   --   --   --   --   --   --  2.4*  --  2.2  --  1.6*  --  2.1   PHOS  --   --   --   --   --  4.0  --  3.7  --  2.8  --  5.2*  --  5.0*  --  3.9  --  5.7*   PROTTOTAL  --   --   --   --   --  5.1*  --  4.9*  --  5.8*  --  5.0*  --   --   --  3.7*  --  5.0*   ALBUMIN  --   --   --   --   --  3.2*  --  3.2*  --  3.7  --  3.3*  --   --   --  2.4*  --  3.3*   BILITOTAL  --   --   --   --   --  1.7*  --  1.9*  --  2.2*  --  2.6*  --   --   --  2.5*  --  4.3*    ALKPHOS  --   --   --   --   --  103  --  100  --  105  --  84  --   --   --  51  --  60   AST  --   --   --   --   --  105*  --  133*  --  177*  --  262*  --   --   --  264*  --  497*   ALT  --   --   --   --   --  388*  --  426*  --  513*  --  546*  --   --   --  422*  --  636*    < > = values in this interval not displayed.     CBC  Recent Labs   Lab 03/04/23 0339 03/03/23 1955 03/03/23  0354 03/02/23 1945   HGB 9.4* 9.1* 9.8* 9.9*   WBC 15.2* 13.4* 12.1* 9.9   RBC 3.05* 2.98* 3.24* 3.23*   HCT 28.9* 28.0* 29.5* 29.0*   MCV 95 94 91 90   MCH 30.8 30.5 30.2 30.7   MCHC 32.5 32.5 33.2 34.1   RDW 17.8* 17.4* 16.9* 16.3*   PLT 66* 57* 72* 67*     INR  Recent Labs   Lab 03/04/23 0339 03/03/23 1955 03/03/23  1514 03/03/23  0354   INR 1.38* 1.56* 1.59* 1.66*   PTT 26 27 55* 27     ABG  Recent Labs   Lab 03/04/23  0944 03/04/23  0744 03/04/23  0339 03/01/23  1423 03/01/23  1317   PH 7.32* 7.30*  --  7.41 7.38   PCO2 56* 61*  --  37 42   PO2 83 61*  --  112* 225*   HCO3 29* 29*  --  23 25   O2PER 30 2 2 40.0 60.0      Urine Studies  Recent Labs   Lab Test 02/27/23  1616 02/24/23  1818 02/22/23  1421 02/19/23  2051   COLOR Yellow Light Yellow Yellow Yellow   APPEARANCE Clear Clear Clear Clear   URINEGLC Negative Negative Negative Negative   URINEBILI Negative Negative Negative Negative   URINEKETONE Negative Negative Negative Negative   SG 1.012 1.009 1.010 1.015   UBLD Negative Negative Negative Negative   URINEPH 5.5 5.0 5.0 5.0   PROTEIN 30* Negative Negative 30*   NITRITE Negative Negative Negative Negative   LEUKEST Negative Negative Negative Negative   RBCU <1 <1 1 26*   WBCU 2 1 2 16*     No lab results found.  PTH  No lab results found.  Iron Studies  Recent Labs   Lab Test 02/22/23  0610 02/20/23  0730 02/16/23  0543 12/20/22  0703 10/06/22  0958 04/07/22  0624   IRON 26*  --   --  249*  --  85   *  --   --   --   --   --    IRONSAT 19  --   --   --   --   --    ASCENCION 1,465* 1,335* 1,725* 2,207* 2,042*  423*       IMAGING:  All imaging studies reviewed by me.

## 2023-03-04 NOTE — PLAN OF CARE
Major Shift Events:  Pt lethargic overnight, team aware. Pupils equal and reactive. Arouses to voice. Confused at times. Hoarse voice/whisper, difficult to understand at times. Follows commands. Did not receive 2000 dose of Robaxin d/t lethargy. Scheduled tylenol. PRN oxycodone given for pain. ST, HR 100s. Afebrile. Maintaining MAP goal >65. 2LNC overnight. VBG done this morning. Notified SICU resident on gas results, no further interventions at this time. NJ with TF @ GR 40ml/hr 30q4h FWF. Insulin gtt. Frequent loose BMs overnight. JUANA x2. No new skin issues. Interdry in place.     Plan: Possible HD run today. Notify team for any changes.     For vital signs and complete assessments, please see documentation flowsheets.

## 2023-03-04 NOTE — PROGRESS NOTES
SURGICAL ICU PROGRESS NOTE  03/04/2023      PRIMARY TEAM: Transplant  PRIMARY PHYSICIAN: Dr. Sterling    REASON FOR CRITICAL CARE ADMISSION:  Hemodynamic monitoring and pressor requirements  ADMITTING PHYSICIAN: Dr. Parekh      ASSESSMENT: Dillon Salter is a 27 year old male with Asperger's, T2DM, alcohol related cirrhosis c/b hepatic encephalopathy, history of esophageal varices bleeding (5/2022, 1/2023) and rectal varices s/p sclerotherapy 1/2023, ascites presenting with hematemesis. Underwent EGD 2/11 showed oozing portal hypertensive gastropathy. He is now s/p DDLT 2/28/23 with 20L EBL. He is admitted to the SICU for post-op hemodynamic and respiratory monitoring. Requiring short term pressors, low urinary output intraoperatively with concern for possible ongoing oozing, he returned to the OR on 03/01 for washout and repair of bleeder. CRRT switched to iHD on 3.3.     Overnight  Hypercapnic and somnolent overnight. Following commands this AM.     Changes Today:  -BiPAP   -ABG to monitor hypercapnia   -iHD per nephrology   -Up to chair today/PT  -Delirium precautions  -CXR   -remove art line     Neuro/ pain/ sedation:  - Monitor neurological status. Notify the MD for any acute changes in exam.  - Scheduled tylenol, robaxin (hold for somnolence), PRN oxy (hold for now) and dilaudid.      Pulmonary care:   #Hypoxia 2/2 to fluid overload  Vent Mode: CPAP/PS  (Continuous positive airway pressure with Pressure Support)  FiO2 (%): 30 %  Resp Rate (Set): 12 breaths/min  Tidal Volume (Set, mL): 300 mL  PEEP (cm H2O): 5 cmH2O  Pressure Support (cm H2O): 7 cmH2O  Resp: 11  - Extubated on BiPAP  - Supplemental oxygen to keep saturation above 92 %.  - Incentive spirometer every 15- 30 minutes when awake.        Cardiovascular:    #Shock, hypovolemic, improving  - Monitor hemodynamic status. MAP goal >65   - Off of pressors     GI care:   #ESLD 2/2 alcoholic cirrhosis  #Hx of esophageal varices   #Portal hypertensive  gastropathy  #Esophageal ulceration  #Recurrent rectal varices  #Hx of hepatic encephalopathy   - Liver US: 7.2 x 1.8 x 7.3 right perihepatic hematoma  - Monitor LFTs all downtrending  - IV Protonix   - Coreg 6.25mg BID   - Hold PTA meds on bumex 1 mg daily and spironolactone 100 mg daily  - GI and transplant following      Renal/ Fluids/ Electrolytes/ Nutrition:   #ASHISH   - Baseline Cr <1 (Cr:1.61) CTM  - CRRT started on 03/01 in the setting of anuria , planing on transitioning to hemodialysis   - Current goal of I:O  - Goal of net negative 100-150 ml/hr  - Will continue to monitor intake and output  - NJ placed on 03/02, at goal of 40ml/hr.       Endocrine:    #Stress and steroid Induced hyperglycemia   #Type II Diabetes   -Hgb A1c  5.6%  -Insulin drip, keep till off steroids and tolerating PO      ID/ Antibiotics:  #Leukocytosis   - Afebrile. WBC: 12.1 up from 5.5 yesterday  - Lactate: 0.7 down from 1.0 on 03/2 down trending      #Day op Abs  - Zoyn x 48 hrs      #Tx prophylaxis   - Bactrim, Valcyte, Maritza     #Tx immunosuppression   - MMF, Tac, Pred taper      Heme:     #Acute on chronic blood loss anemia- stable  #Acute blood loss anemia  -Hemoglobin on 03/01 was 6.6 ( received 2 units pRBC, 1 FFP and 1 platelets ).     -Transfusion goals: Hemoglobin greater than 7, fibrinogen greater than 200, INR less than 2, platelets greater than 20 or sign/symptoms of hypoperfusion     Prophylaxis:    -Mechanical prophylaxis for DVT.   -No chemical DVT prophylaxis due to high risk of bleeding.  - PPIs      MSK:    #Chronic Pain   - PTA oxycodone   - PTA Tylenol   - PT and OT consulted. Appreciate recs.     General Cares/Prophylaxis:    DVT Prophylaxis: Pneumatic Compression Devices  GI Prophylaxis: PPI  Restraints: Restraints for medical healing needed: Not Applicable    Lines/ tubes/ drains:  - Left radial art line- remove  - Right  IJ MAC lines  - Right IJ  Dialysis catheter   - 2 JUANA drain   - Peripheral  IV    Disposition:  - Surgical ICU    Patient seen and discussed with staff.    Nahum Blankenship MD  Surgery Resident  [id number]        ====================================    TODAY'S SUBJECTIVE/INTERVAL HISTORY:   Extubated, lethargic but following commands.     OBJECTIVE:     Temp:  [96.7  F (35.9  C)-98.2  F (36.8  C)] 97.2  F (36.2  C)  Pulse:  [] 96  Resp:  [8-18] 11  BP: (116-138)/(61-76) 125/68  MAP:  [78 mmHg-125 mmHg] 115 mmHg  Arterial Line BP: ()/() 130/110  FiO2 (%):  [30 %] 30 %  SpO2:  [90 %-100 %] 97 %  Vent Mode: CPAP/PS  (Continuous positive airway pressure with Pressure Support)  FiO2 (%): 30 %  Resp Rate (Set): 12 breaths/min  Tidal Volume (Set, mL): 300 mL  PEEP (cm H2O): 5 cmH2O  Pressure Support (cm H2O): 7 cmH2O  Resp: 11      I/O last 3 completed shifts:  In: 1924.22 [I.V.:594.22; NG/GT:470]  Out: 2631.5 [Drains:234; Other:2397.5]    General/Neuro: BiPAP moderately arousable. Able to wiggle toes and hands.    CV: RRR  Pulm: BiPAP  Abd: soft; nondistended, incision not saturating  Extremities: no edema, following commands  Skin: warm and well-perfused.     LABS:   Arterial Blood Gases   Recent Labs   Lab 03/04/23  0944 03/04/23  0744 03/01/23  1423 03/01/23  1317   PH 7.32* 7.30* 7.41 7.38   PCO2 56* 61* 37 42   PO2 83 61* 112* 225*   HCO3 29* 29* 23 25     Complete Blood Count   Recent Labs   Lab 03/04/23  0339 03/03/23 1955 03/03/23  0354 03/02/23 1945   WBC 15.2* 13.4* 12.1* 9.9   HGB 9.4* 9.1* 9.8* 9.9*   PLT 66* 57* 72* 67*     Basic Metabolic Panel  Recent Labs   Lab 03/04/23  1053 03/04/23  0954 03/04/23  0850 03/04/23  0752 03/04/23  0342 03/04/23  0339 03/03/23 2001 03/03/23  1955 03/03/23  1522 03/03/23  1514 03/03/23  0359 03/03/23  0354   NA  --   --   --   --   --  136  --  137  --  137  --  137   POTASSIUM  --   --   --   --   --  3.8  --  3.9  --  3.2*  --  4.7   CHLORIDE  --   --   --   --   --  101  --  103  --  102  --  105   CO2  --   --   --   --   --  25  --  25   --  25  --  24   BUN  --   --   --   --   --  39.4*  --  28.5*  --  19.3  --  36.5*   CR  --   --   --   --   --  1.62*  --  1.21*  --  0.85  --  1.33*   * 163* 152* 118*   < > 102*   < > 140*   < > 156*   < > 144*    < > = values in this interval not displayed.     Liver Function Tests  Recent Labs   Lab 03/04/23 0339 03/03/23 1955 03/03/23 1514 03/03/23  0354   * 133* 177* 262*   * 426* 513* 546*   ALKPHOS 103 100 105 84   BILITOTAL 1.7* 1.9* 2.2* 2.6*   ALBUMIN 3.2* 3.2* 3.7 3.3*   INR 1.38* 1.56* 1.59* 1.66*     Pancreatic Enzymes  Recent Labs   Lab 03/01/23  0609 02/27/23  1057   LIPASE 19  --    AMYLASE 62 12*     Coagulation Profile  Recent Labs   Lab 03/04/23 0339 03/03/23 1955 03/03/23 1514 03/03/23  0354   INR 1.38* 1.56* 1.59* 1.66*   PTT 26 27 55* 27         IMAGING:   No results found for this or any previous visit (from the past 24 hour(s)).

## 2023-03-04 NOTE — PROGRESS NOTES
Transplant Surgery  Inpatient Daily Progress Note  2023    Assessment & Plan: Dillon Salter is a 27 year old male with a past medical history of Asperger's, DM2, and alcoholic cirrhosis c/b esophageal varices and hepatic encephalopathy. He is now s/p  donor liver transplant without stent on 23 with Dr. Sterling. Return to OR for washout and repair of arterial bleeding on 3/1/23.    s/p DDLT 23: POD #4. Tbili 2.6 (2.5), AST increased today so will get US. LA 0.9.   -3/1 US: resolution of hematoma, patent vessels  -3/3 US: Patent, resolution of perihepatic hematoma with new perihepatic hematoma up to 9 cm.    Immunosuppression management:  Induction: Steroid taper and basiliximab x2 per renal sparing protocol.  Maintenance:   - mg BID  -Tacrolimus goal level 5-8 due to renal sparing protocol, will decrease dose to 1 mg BID.  -Prednisone 5mg daily after taper.    Neuro/Psych:  Acute post op pain; Chronic musculoskeletal pain: On oxycodone PTA. Currently on hold due to sedation  MDD, Anxiety, Autism spectrum: Mother is caretaker, lives with parents. Restart PTA fluoxetine as able.     Hematology:   Anemia of chronic disease/Acute blood loss: Hgb 9.4, now stable.  Thrombocytopenia: PLT 66, no need to transfuse further.  Coagulopathy: INR 1.4  Will not plan for subcutaneous heparin for DVT prophylaxis since patient already coagulopathic    Cardiorespiratory:   Post op ventilatory support/hypercapnia: Extubated 3/3. Lethargic and hypercapnic this AM. Currently in BiPAP  Hypovolemic/hemorrhagic shock: Secondary to bleeding. Resolved.    GI/Nutrition:   Severe malnutrition in the context of acute illness: Nutrition consulted. FT placed, receiving Novasource Renal    Endocrine:   DM2; Steroid induced hyperglycemia: Continue insulin gtt.     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 CRRT  started 3/1/23. Planning for iHD today.  Hyperkalemia: Resolved with CRRT.    : Negron removed. Check periodic bladder scans.    Infectious disease: No issues.     Prophylaxis: DVT (mechanical), fall, GI (PPI), fungal (Initially received fluconazole, change to Micafungin d/t CRRT/re-operation), viral (Valcyte), pneumocystis (Bactrim not yet restarted, hold off with hyperkalemia), jalen-op (Zosyn)    Disposition: SICU    CHRISTA/Fellow/Resident Provider: Rosita Cohn PA-C [id number]  20 minutes spent on chart review, exam, and discussion with Nephrology/SICU.    Faculty: Thelma Sterling M.D.    __________________________________________________________________  Transplant History:    2/28/2023 (Liver), Postoperative day: 4     Interval History: Lethargic, improving with BiPAP  Overnight events: Extubated yesterday afternoon, hypercarbic this AM, .    ROS:   A 10-point review of systems was negative except as noted above.    Curent Meds:    sodium chloride 0.9%  300 mL Hemodialysis Machine Once     acetaminophen  650 mg Oral Q8H     albumin human  250 mL Intravenous Once in dialysis/CRRT     [START ON 3/5/2023] basiliximab (SIMULECT) infusion  20 mg Intravenous Once     sodium chloride (PF) 0.9%  10 mL Intracatheter Once in dialysis/CRRT    Followed by     heparin  1.3-2.6 mL Intracatheter Once in dialysis/CRRT     sodium chloride (PF) 0.9%  10 mL Intracatheter Once in dialysis/CRRT    Followed by     heparin  1.3-2.6 mL Intracatheter Once in dialysis/CRRT     methocarbamol  500 mg Oral 4x Daily     micafungin  100 mg Intravenous Q24H     multivitamin RENAL  1 tablet Oral Daily     mycophenolate  750 mg Oral BID IS    Or     mycophenolate  750 mg Oral or NG Tube BID IS     - MEDICATION INSTRUCTIONS -   Does not apply Once     pantoprazole  40 mg Per Feeding Tube BID     polyethylene glycol  17 g Oral Daily     [START ON 3/5/2023] predniSONE  10 mg Oral Once     [START ON 3/6/2023] predniSONE  5 mg Oral Daily     protein  modular  1 packet Per Feeding Tube BID     sodium chloride (PF)  3 mL Intravenous Q8H     tacrolimus  2 mg Oral BID IS    Or     tacrolimus  2 mg Oral or NG Tube BID IS     valGANciclovir  450 mg Oral Once per day on Tue Fri    Or     valGANciclovir  450 mg Oral or NG Tube Once per day on Tue Fri       Physical Exam:     Admit Weight: 65.9 kg (145 lb 4.5 oz)    Current Vitals:   /68   Pulse 96   Temp 97.2  F (36.2  C) (Axillary)   Resp 11   Wt 82.8 kg (182 lb 8.7 oz)   SpO2 97%   BMI 30.38 kg/m      Vital sign ranges:    Temp:  [96.7  F (35.9  C)-98.2  F (36.8  C)] 97.2  F (36.2  C)  Pulse:  [] 96  Resp:  [8-18] 11  BP: (116-138)/(61-76) 125/68  MAP:  [78 mmHg-125 mmHg] 115 mmHg  Arterial Line BP: ()/() 130/110  FiO2 (%):  [30 %] 30 %  SpO2:  [90 %-100 %] 97 %    General Appearance: extubated, using BiPAP  Skin: warm, dry, jaundice  Heart: NSR  Lungs: Nonlabored resps, using BiPAP  Abdomen: abdomen soft, non-distended. Incision covered. L JUANA serosang, right darker serosang  : Negron removed  Extremities: edema: generalized +1-2  Neurologic: Tremor absent. Answering some questions    Frailty Scores     Frailty Scores 12/20/2022    Final Score Frail    Final Score Number 5          Data:   CMP  Recent Labs   Lab 03/04/23  1053 03/04/23  0954 03/04/23  0342 03/04/23  0339 03/03/23  2001 03/03/23  1955 03/03/23  0359 03/03/23  0354 03/03/23  0219 03/03/23  0130 03/01/23  0740 03/01/23  0609 02/27/23  1406 02/27/23  1057   NA  --   --   --  136  --  137   < > 137  --  141   < > 142   < >  --    POTASSIUM  --   --   --  3.8  --  3.9   < > 4.7  --  4.6   < > 5.7*   < >  --    CHLORIDE  --   --   --  101  --  103   < > 105  --  108*   < > 103   < >  --    CO2  --   --   --  25  --  25   < > 24  --  24   < > 25   < >  --    * 163*   < > 102*   < > 140*   < > 144*   < > 142*   < > 114*   < >  --    BUN  --   --   --  39.4*  --  28.5*   < > 36.5*  --  34.8*   < > 60.2*   < >  --    CR  --    --   --  1.62*  --  1.21*   < > 1.33*  --  1.26*   < > 1.67*   < >  --    GFRESTIMATED  --   --   --  59*  --  83   < > 74  --  79   < > 56*   < >  --    SARTHAK  --   --   --  8.6  --  8.3*   < > 8.6  --  8.4*   < > 9.4   < >  --    ICAW  --   --   --  5.0  --   --   --  4.8  --   --    < > 4.8   < >  --    MAG  --   --   --   --   --   --   --  2.4*  --  2.2   < > 2.1   < > 2.7*   PHOS  --   --   --  4.0  --  3.7   < > 5.2*  --  5.0*   < > 6.7*   < > 3.1   AMYLASE  --   --   --   --   --   --   --   --   --   --   --  62  --  12*   LIPASE  --   --   --   --   --   --   --   --   --   --   --  19  --   --    ALBUMIN  --   --   --  3.2*  --  3.2*   < > 3.3*  --   --    < > 2.8*   < >  --    BILITOTAL  --   --   --  1.7*  --  1.9*   < > 2.6*  --   --    < > 4.9*   < >  --    ALKPHOS  --   --   --  103  --  100   < > 84  --   --    < > 43   < >  --    AST  --   --   --  105*  --  133*   < > 262*  --   --    < > 347*   < >  --    ALT  --   --   --  388*  --  426*   < > 546*  --   --    < > 268*   < >  --     < > = values in this interval not displayed.     CBC  Recent Labs   Lab 03/04/23  0339 03/03/23 1955 02/28/23  1850 02/28/23  1703   HGB 9.4* 9.1*   < > 9.8*   WBC 15.2* 13.4*   < > 6.2   PLT 66* 57*   < > 94*   A1C  --   --   --  6.1*    < > = values in this interval not displayed.

## 2023-03-04 NOTE — PLAN OF CARE
Status  D/I: Patient on unit 4A Surgical/Neuro ICU   Neuro- oriented x4, increasingly more alert throughout the day, some visual hallucinations this morning, seems to have improved throughout the day, speech continues to be more clear as well, moves all extremities to command, no numbness/tingling  Pain/Sedation- sedating meds held by provider, pain well controlled with scheduled tylenol, did give 2.5 mg of oxycodone this afternoon  CV- sinus tachycardia, to low 100's, no ectopy noted; BP stable; afebrile  Pulm- CO2 elevated this morning, patient placed on BiPAP for 2 hours, CO2 improved, trending VBG's, will notify SICU of any concerns, 2L nasal cannula while off BiPAP; chest xray done; clear lung sounds  GI/- bedside swallow done, passed, few sips given, remains NPO, may get diet order this evening if patient continues to be more alert; tube feeds at goal, standard flushes; oliguric; multiple liquid small/smears today, holding bowel meds  Skin- unchanged (see flowsheets); RLQ JUANA drain removed by SICU  Gtts- TKO + insulin drip--will transition to sliding scale when off steroid taper  ID- micafungin   Labs- trending VBG's  Activity- stood + few steps to chair with heavy 2 assist and gait belt, chair for 2 hours, tolerated well, lift used to get back to bed     PLAN- UF/HD this afternoon started around 1500; continue to work with PT/OT; trend labs/VBG and monitor respiratory/neuro status for any changes    See flow sheets for further interventions and assessments.  P: Continue to monitor pt closely, Notify MD of changes/concerns.

## 2023-03-05 ENCOUNTER — APPOINTMENT (OUTPATIENT)
Dept: GENERAL RADIOLOGY | Facility: CLINIC | Age: 28
DRG: 005 | End: 2023-03-05
Attending: PHYSICIAN ASSISTANT
Payer: COMMERCIAL

## 2023-03-05 LAB
ALBUMIN SERPL BCG-MCNC: 3 G/DL (ref 3.5–5.2)
ALP SERPL-CCNC: 122 U/L (ref 40–129)
ALT SERPL W P-5'-P-CCNC: 251 U/L (ref 10–50)
AMMONIA PLAS-SCNC: 11 UMOL/L (ref 16–60)
ANION GAP SERPL CALCULATED.3IONS-SCNC: 12 MMOL/L (ref 7–15)
APTT PPP: 27 SECONDS (ref 22–38)
AST SERPL W P-5'-P-CCNC: 54 U/L (ref 10–50)
BASE EXCESS BLDV CALC-SCNC: 1.3 MMOL/L (ref -7.7–1.9)
BASE EXCESS BLDV CALC-SCNC: 1.5 MMOL/L (ref -7.7–1.9)
BASE EXCESS BLDV CALC-SCNC: 1.8 MMOL/L (ref -7.7–1.9)
BASE EXCESS BLDV CALC-SCNC: 1.9 MMOL/L (ref -7.7–1.9)
BILIRUB DIRECT SERPL-MCNC: 1.28 MG/DL (ref 0–0.3)
BILIRUB SERPL-MCNC: 1.8 MG/DL
BUN SERPL-MCNC: 73.5 MG/DL (ref 6–20)
C DIFF TOX B STL QL: NEGATIVE
CA-I BLD-MCNC: 5.1 MG/DL (ref 4.4–5.2)
CALCIUM SERPL-MCNC: 9.2 MG/DL (ref 8.6–10)
CHLORIDE SERPL-SCNC: 99 MMOL/L (ref 98–107)
CREAT SERPL-MCNC: 2.4 MG/DL (ref 0.67–1.17)
DEPRECATED HCO3 PLAS-SCNC: 23 MMOL/L (ref 22–29)
ERYTHROCYTE [DISTWIDTH] IN BLOOD BY AUTOMATED COUNT: 18.9 % (ref 10–15)
FIBRINOGEN PPP-MCNC: 257 MG/DL (ref 170–490)
GFR SERPL CREATININE-BSD FRML MDRD: 37 ML/MIN/1.73M2
GLUCOSE BLDC GLUCOMTR-MCNC: 105 MG/DL (ref 70–99)
GLUCOSE BLDC GLUCOMTR-MCNC: 114 MG/DL (ref 70–99)
GLUCOSE BLDC GLUCOMTR-MCNC: 120 MG/DL (ref 70–99)
GLUCOSE BLDC GLUCOMTR-MCNC: 123 MG/DL (ref 70–99)
GLUCOSE BLDC GLUCOMTR-MCNC: 127 MG/DL (ref 70–99)
GLUCOSE BLDC GLUCOMTR-MCNC: 128 MG/DL (ref 70–99)
GLUCOSE BLDC GLUCOMTR-MCNC: 131 MG/DL (ref 70–99)
GLUCOSE BLDC GLUCOMTR-MCNC: 141 MG/DL (ref 70–99)
GLUCOSE BLDC GLUCOMTR-MCNC: 142 MG/DL (ref 70–99)
GLUCOSE BLDC GLUCOMTR-MCNC: 162 MG/DL (ref 70–99)
GLUCOSE BLDC GLUCOMTR-MCNC: 169 MG/DL (ref 70–99)
GLUCOSE BLDC GLUCOMTR-MCNC: 170 MG/DL (ref 70–99)
GLUCOSE BLDC GLUCOMTR-MCNC: 172 MG/DL (ref 70–99)
GLUCOSE BLDC GLUCOMTR-MCNC: 92 MG/DL (ref 70–99)
GLUCOSE BLDC GLUCOMTR-MCNC: 95 MG/DL (ref 70–99)
GLUCOSE BLDC GLUCOMTR-MCNC: 97 MG/DL (ref 70–99)
GLUCOSE SERPL-MCNC: 180 MG/DL (ref 70–99)
HCO3 BLDV-SCNC: 28 MMOL/L (ref 21–28)
HCO3 BLDV-SCNC: 29 MMOL/L (ref 21–28)
HCT VFR BLD AUTO: 27.4 % (ref 40–53)
HGB BLD-MCNC: 8.7 G/DL (ref 13.3–17.7)
INR PPP: 1.27 (ref 0.85–1.15)
LACTATE SERPL-SCNC: 0.6 MMOL/L (ref 0.7–2)
MCH RBC QN AUTO: 30.7 PG (ref 26.5–33)
MCHC RBC AUTO-ENTMCNC: 31.8 G/DL (ref 31.5–36.5)
MCV RBC AUTO: 97 FL (ref 78–100)
O2/TOTAL GAS SETTING VFR VENT: 21 %
O2/TOTAL GAS SETTING VFR VENT: 30 %
OXYHGB MFR BLDV: 72 % (ref 70–75)
PCO2 BLDV: 53 MM HG (ref 40–50)
PCO2 BLDV: 54 MM HG (ref 40–50)
PCO2 BLDV: 56 MM HG (ref 40–50)
PCO2 BLDV: 56 MM HG (ref 40–50)
PH BLDV: 7.31 [PH] (ref 7.32–7.43)
PH BLDV: 7.32 [PH] (ref 7.32–7.43)
PH BLDV: 7.32 [PH] (ref 7.32–7.43)
PH BLDV: 7.34 [PH] (ref 7.32–7.43)
PHOSPHATE SERPL-MCNC: 5 MG/DL (ref 2.5–4.5)
PLATELET # BLD AUTO: 68 10E3/UL (ref 150–450)
PO2 BLDV: 36 MM HG (ref 25–47)
PO2 BLDV: 38 MM HG (ref 25–47)
PO2 BLDV: 41 MM HG (ref 25–47)
PO2 BLDV: 42 MM HG (ref 25–47)
POTASSIUM SERPL-SCNC: 3.7 MMOL/L (ref 3.4–5.3)
PROT SERPL-MCNC: 4.8 G/DL (ref 6.4–8.3)
RBC # BLD AUTO: 2.83 10E6/UL (ref 4.4–5.9)
SODIUM SERPL-SCNC: 134 MMOL/L (ref 136–145)
TACROLIMUS BLD-MCNC: 9.6 UG/L (ref 5–15)
TME LAST DOSE: NORMAL H
TME LAST DOSE: NORMAL H
WBC # BLD AUTO: 11 10E3/UL (ref 4–11)

## 2023-03-05 PROCEDURE — 85610 PROTHROMBIN TIME: CPT | Performed by: STUDENT IN AN ORGANIZED HEALTH CARE EDUCATION/TRAINING PROGRAM

## 2023-03-05 PROCEDURE — 85027 COMPLETE CBC AUTOMATED: CPT | Performed by: STUDENT IN AN ORGANIZED HEALTH CARE EDUCATION/TRAINING PROGRAM

## 2023-03-05 PROCEDURE — 250N000013 HC RX MED GY IP 250 OP 250 PS 637: Performed by: NURSE PRACTITIONER

## 2023-03-05 PROCEDURE — 82803 BLOOD GASES ANY COMBINATION: CPT | Performed by: STUDENT IN AN ORGANIZED HEALTH CARE EDUCATION/TRAINING PROGRAM

## 2023-03-05 PROCEDURE — 250N000012 HC RX MED GY IP 250 OP 636 PS 637: Performed by: PHYSICIAN ASSISTANT

## 2023-03-05 PROCEDURE — 258N000003 HC RX IP 258 OP 636: Performed by: NURSE PRACTITIONER

## 2023-03-05 PROCEDURE — 999N000157 HC STATISTIC RCP TIME EA 10 MIN

## 2023-03-05 PROCEDURE — 84100 ASSAY OF PHOSPHORUS: CPT | Performed by: STUDENT IN AN ORGANIZED HEALTH CARE EDUCATION/TRAINING PROGRAM

## 2023-03-05 PROCEDURE — 85730 THROMBOPLASTIN TIME PARTIAL: CPT | Performed by: STUDENT IN AN ORGANIZED HEALTH CARE EDUCATION/TRAINING PROGRAM

## 2023-03-05 PROCEDURE — 71045 X-RAY EXAM CHEST 1 VIEW: CPT

## 2023-03-05 PROCEDURE — 80197 ASSAY OF TACROLIMUS: CPT | Performed by: PHYSICIAN ASSISTANT

## 2023-03-05 PROCEDURE — 71045 X-RAY EXAM CHEST 1 VIEW: CPT | Mod: 26 | Performed by: RADIOLOGY

## 2023-03-05 PROCEDURE — 83605 ASSAY OF LACTIC ACID: CPT | Performed by: STUDENT IN AN ORGANIZED HEALTH CARE EDUCATION/TRAINING PROGRAM

## 2023-03-05 PROCEDURE — 80053 COMPREHEN METABOLIC PANEL: CPT | Performed by: STUDENT IN AN ORGANIZED HEALTH CARE EDUCATION/TRAINING PROGRAM

## 2023-03-05 PROCEDURE — 99233 SBSQ HOSP IP/OBS HIGH 50: CPT | Mod: 24 | Performed by: INTERNAL MEDICINE

## 2023-03-05 PROCEDURE — 85384 FIBRINOGEN ACTIVITY: CPT | Performed by: STUDENT IN AN ORGANIZED HEALTH CARE EDUCATION/TRAINING PROGRAM

## 2023-03-05 PROCEDURE — 82248 BILIRUBIN DIRECT: CPT | Performed by: SURGERY

## 2023-03-05 PROCEDURE — 200N000002 HC R&B ICU UMMC

## 2023-03-05 PROCEDURE — 250N000013 HC RX MED GY IP 250 OP 250 PS 637: Performed by: SURGERY

## 2023-03-05 PROCEDURE — 93005 ELECTROCARDIOGRAM TRACING: CPT

## 2023-03-05 PROCEDURE — 250N000009 HC RX 250: Performed by: STUDENT IN AN ORGANIZED HEALTH CARE EDUCATION/TRAINING PROGRAM

## 2023-03-05 PROCEDURE — 250N000012 HC RX MED GY IP 250 OP 636 PS 637: Performed by: SURGERY

## 2023-03-05 PROCEDURE — 82330 ASSAY OF CALCIUM: CPT | Performed by: SURGERY

## 2023-03-05 PROCEDURE — 250N000013 HC RX MED GY IP 250 OP 250 PS 637: Performed by: STUDENT IN AN ORGANIZED HEALTH CARE EDUCATION/TRAINING PROGRAM

## 2023-03-05 PROCEDURE — 93010 ELECTROCARDIOGRAM REPORT: CPT | Performed by: INTERNAL MEDICINE

## 2023-03-05 PROCEDURE — 250N000011 HC RX IP 250 OP 636: Performed by: NURSE PRACTITIONER

## 2023-03-05 PROCEDURE — 82140 ASSAY OF AMMONIA: CPT | Performed by: PHYSICIAN ASSISTANT

## 2023-03-05 PROCEDURE — 87493 C DIFF AMPLIFIED PROBE: CPT | Performed by: PHYSICIAN ASSISTANT

## 2023-03-05 PROCEDURE — 94660 CPAP INITIATION&MGMT: CPT

## 2023-03-05 PROCEDURE — 250N000013 HC RX MED GY IP 250 OP 250 PS 637

## 2023-03-05 PROCEDURE — 82805 BLOOD GASES W/O2 SATURATION: CPT | Performed by: PHYSICIAN ASSISTANT

## 2023-03-05 RX ORDER — LANOLIN ALCOHOL/MO/W.PET/CERES
3 CREAM (GRAM) TOPICAL EVERY EVENING
Status: DISCONTINUED | OUTPATIENT
Start: 2023-03-05 | End: 2023-03-09

## 2023-03-05 RX ADMIN — Medication 40 MG: at 07:32

## 2023-03-05 RX ADMIN — MYCOPHENOLATE MOFETIL 750 MG: 200 POWDER, FOR SUSPENSION ORAL at 07:33

## 2023-03-05 RX ADMIN — ACETAMINOPHEN 650 MG: 325 TABLET, FILM COATED ORAL at 00:48

## 2023-03-05 RX ADMIN — TACROLIMUS 1 MG: 5 CAPSULE ORAL at 18:27

## 2023-03-05 RX ADMIN — ACETAMINOPHEN 650 MG: 325 TABLET, FILM COATED ORAL at 09:04

## 2023-03-05 RX ADMIN — OXYCODONE HYDROCHLORIDE 2.5 MG: 5 TABLET ORAL at 00:48

## 2023-03-05 RX ADMIN — FLUOXETINE HYDROCHLORIDE 60 MG: 40 CAPSULE ORAL at 22:05

## 2023-03-05 RX ADMIN — HUMAN INSULIN 4 UNITS/HR: 100 INJECTION, SOLUTION SUBCUTANEOUS at 07:31

## 2023-03-05 RX ADMIN — TACROLIMUS 1 MG: 5 CAPSULE ORAL at 07:35

## 2023-03-05 RX ADMIN — ACETAMINOPHEN 650 MG: 325 TABLET, FILM COATED ORAL at 18:26

## 2023-03-05 RX ADMIN — MYCOPHENOLATE MOFETIL 750 MG: 200 POWDER, FOR SUSPENSION ORAL at 18:27

## 2023-03-05 RX ADMIN — PREDNISONE 10 MG: 10 TABLET ORAL at 07:34

## 2023-03-05 RX ADMIN — Medication 40 MG: at 20:24

## 2023-03-05 RX ADMIN — MICAFUNGIN 100 MG: 10 INJECTION, POWDER, LYOPHILIZED, FOR SOLUTION INTRAVENOUS at 07:34

## 2023-03-05 RX ADMIN — ASPIRIN 325 MG ORAL TABLET 325 MG: 325 PILL ORAL at 07:33

## 2023-03-05 RX ADMIN — SODIUM CHLORIDE 20 MG: 9 INJECTION, SOLUTION INTRAVENOUS at 15:05

## 2023-03-05 RX ADMIN — B-COMPLEX W/ C & FOLIC ACID TAB 1 MG 1 TABLET: 1 TAB at 07:33

## 2023-03-05 RX ADMIN — Medication 3 MG: at 20:24

## 2023-03-05 ASSESSMENT — ACTIVITIES OF DAILY LIVING (ADL)
ADLS_ACUITY_SCORE: 41

## 2023-03-05 NOTE — PROGRESS NOTES
HEMODIALYSIS TREATMENT NOTE    Date: 3/4/2023  Time: 7:50 PM    Data:  Pre Wt:  80.8 kg   Desired Wt: 61.5  kg   Post Wt:  ~77.8 kg  Weight change: 3 kg  Ultrafiltration - Post Run Net Total Removed (mL): 3000 mL  Vascular Access Status: patent  Dialyzer Rinse: Light, Streaked  Total Blood Volume Processed: 0 L Liters  Total Dialysis (Treatment) Time: 3.5 Hours    Lab:   No    Interventions:  3.5 hour sequential tx, Pt was hemodynamically stable throughout HD, crit line used for fluid monitoring, cvc patent, both ports aspirate and flush well, ran at 350-400 blood flow rate. Rinsed back successfully, see HD flow sheet for all data. Report given to primary RN post dialysis.    Assessment:  Alert and oriented X4, Unlabored respirations, Right non tunneled CVC, both ports aspirate and flush.     Plan:    Per renal Team, pt to resume HD tx on Monday.

## 2023-03-05 NOTE — PROGRESS NOTES
[facility]  Transplant Nephrology Follow Up  Date of Admission:  2/11/2023  Today's Date: 03/05/2023  Requesting physician: Thelma Sterling MD    Recommendations:  -Plan for MWF iHD until renal recovery, next dialysis tmrw    -Assessment & Plan   # ASHISH: anuric due to hemorrhagic shock, ATN on top of HRS that was present pre txp   - Baseline Creatinine: ~ 0.9   - Proteinuria: Normal (<0.2 grams)   - Kidney Biopsy: No    -Pt developed ASHISH with Scr 1.88 on 1/23, improved to 1.22 on 2/27, increased to 1.71 on 2/28, day of transplant, likely 2/2 HRS. Now with anuric ASHISH 2/2 ATN from hypoperfusion due to hemorrhagic shock in the face of pre existing HRS. CRRT started 3/1/23 and stopped 3/3. Tolerated iHD 3/3 and isolated UF on 3/4.  - HD Info  Access: Temporary Catheter RIJ, Days: MWF, Length: 3.5 hrs, EDW: TBW, Heparin: No, MARISEL: No, IV Iron: No, Vit D analog: No   -Consent obtained from patient's mother on 3/1   -Will plan iHD MWF until renal recovery    # Liver Tx:    -S/p OR takeback on 3/1 for bleeding and found to have an active bleeding vessel from the diaphragm.  Source has been controlled. Now hemodynamically stable   -LFTs downtrending    # Immunosuppression: Tacrolimus immediate release (goal 5-8), Mycophenolate mofetil (dose 750 mg every 12 hours) and Prednisone (dose 5 mg daily)   - Changes: Not at this time. Per transplant surgery team    # Infection Prophylaxis:   - PJP: Sulfa/TMP (Bactrim)  - CMV: Valganciclovir (Valcyte)  - Fungal: Micafungin (Mycamine)    # Hypertension: Controlled;  Goal BP: < 150/90   - Volume status: Mildly hypervolemic  EDW ~ 70kg   - Changes: Not at this time. Tolerated iHD on 3/3 and isolated UF on 3/4. Plan for iHD on 3/6 for further UF    # Diabetes: Controlled (HbA1c <7%) Last HbA1c: 6.1%   - Management as per primary team.    # Anemia of acute blood loss: Hgb: Stable after OR takeback for bleeding on 3/1      MARISEL: No   - Iron  studies: Not checked recently    -Transfusion for Hb<7.     # Mineral Bone Disorder:   - Calcium; level: Normal        On supplement: No  - Phosphorus; level: High    On binder: No, modulate with iHD    # Electrolytes:   - Potassium; level: Normal        On supplement: No  - Magnesium; level: High        On supplement: No  - Bicarbonate; level: Normal        On supplement: No    # Hyperkalemia:    -Resolved with shifting, initiation of CRRT on 3/1    -Will modulate levels with iHD.    # Lactic acidosis:   -2/2 hypoperfusion due to hemorrhagic shock. Resolved after OR takeback on 3/1 and initiation of CRRT.    # Acute encephalopathy:   -Unclear etiology. Does not seem to clear with Bipap. Unlikely to be uremia as it did not improve with iHD    # Hypercarbia:   -Intermittently on BiPap per ICU team    # Transplant History:  Tx: Liver Tx  Transplant: 2/28/2023 (Liver)  Significant changes in immunosuppression: None  Significant transplant-related complications: None    Recommendations were communicated to the primary team verbally.      Anderson Jones MD  Pager: [phone]    Interval history    Patient tolerated isolated UF yesterday. He is alert, oriented to place, time, and person but still confused and asking about Oc. Denies N/V/D, fever, chills, SOB    Review of Systems    Review of systems negative x4 systems except as noted above in interval history    Past Medical History    I have reviewed this patient's medical history and updated it with pertinent information if needed.   Past Medical History:   Diagnosis Date     Acute on chronic anemia 04/08/2022     Alcohol use disorder      Alcohol use disorder, severe, dependence (H) 07/11/2022     Alcoholic cirrhosis of liver with ascites (H)      Alcoholic hepatitis      Autism spectrum disorder 04/08/2022    High functioning autistic.  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  Past Surgical History:   Procedure Laterality Date     BENCH LIVER  2/28/2023    Procedure: Bench liver;  Surgeon: Thelma Sterling MD;  Location: [facility]     COLONOSCOPY N/A 1/27/2023    Procedure: Colonoscopy;  Surgeon: Osman Montoya MD;  Location: [facility]     ENDOSCOPIC ULTRASOUND LOWER GASTROINTESTIONAL TRACT (GI) N/A 1/27/2023    Procedure: ULTRASOUND, LOWER GI TRACT, ENDOSCOPIC with glueing;  Surgeon: Osman Montoya MD;  Location: [facility]     ESOPHAGOSCOPY, GASTROSCOPY, DUODENOSCOPY (EGD), COMBINED N/A 5/24/2022    Procedure: ESOPHAGOGASTRODUODENOSCOPY (EGD) with esophageal banding;  Surgeon: Gary Hurst MD;  Location: [facility]     ESOPHAGOSCOPY, GASTROSCOPY, DUODENOSCOPY (EGD), COMBINED N/A 6/20/2022    Procedure: ESOPHAGOGASTRODUODENOSCOPY;  Surgeon: Gavino Reza MD;  Location: [facility]     ESOPHAGOSCOPY, GASTROSCOPY, DUODENOSCOPY (EGD), COMBINED N/A 1/23/2023    Procedure: ESOPHAGOGASTRODUODENOSCOPY (EGD) with esophageal variceal banding;  Surgeon: Juan Boo MD;  Location: [facility]     ESOPHAGOSCOPY, GASTROSCOPY, DUODENOSCOPY (EGD), COMBINED N/A 1/25/2023    Procedure: ESOPHAGOGASTRODUODENOSCOPY (EGD);  Surgeon: Juan Boo MD;  Location: [facility]     ESOPHAGOSCOPY, GASTROSCOPY, DUODENOSCOPY (EGD), COMBINED N/A 1/27/2023    Procedure: Esophagoscopy, gastroscopy, duodenoscopy (EGD), combined;  Surgeon: Osman Montoya MD;  [facility]: [facility]     ESOPHAGOSCOPY, GASTROSCOPY, DUODENOSCOPY (EGD), COMBINED N/A  2023    Procedure: ESOPHAGOGASTRODUODENOSCOPY (EGD);  Surgeon: Leventhal, Thomas Michael, MD;  Location: [facility]     MIDLINE DOUBLE LUMEN PLACEMENT  2022          PICC TRIPLE LUMEN PLACEMENT  2022          RETURN LIVER TRANSPLANT N/A 3/1/2023    Procedure: RETURN TO OPERATING ROOM, AFTER LIVER TRANSPLANT;  Surgeon: Thelma Sterling MD;  Location: [facility]     TRANSPLANT LIVER RECIPIENT  DONOR N/A 2023    Procedure: Transplant liver recipient  donor;  Surgeon: Thelma Sterling MD;  Location: [facility]       Family History   I have reviewed this patient's family history and updated it with pertinent information if needed.   Family History   Problem Relation Age of Onset     Hypertension Mother      Substance Abuse Mother      Thyroid Disease Mother      Heart Failure Father      Substance Abuse Father      Chronic Obstructive Pulmonary Disease Father      Substance Abuse Maternal Grandfather        Social History   I have reviewed this patient's social history and updated it with pertinent information if needed. Dillon Salter  reports that he has quit smoking. He has never used smokeless tobacco. He reports that he does not currently use alcohol. He reports that he does not currently use drugs after having used the following drugs: Marijuana.    Allergies   No Known Allergies  Prior to Admission Medications     acetaminophen  650 mg Oral Q8H     aspirin  325 mg Per Feeding Tube Daily     basiliximab (SIMULECT) infusion  20 mg Intravenous Once     FLUoxetine  60 mg Oral At Bedtime     [Held by provider] methocarbamol  500 mg Oral 4x Daily     micafungin  100 mg Intravenous Q24H     multivitamin RENAL  1 tablet Oral Daily     mycophenolate  750 mg Oral BID IS    Or     mycophenolate  750 mg Oral or NG Tube BID IS     pantoprazole  40 mg Per Feeding Tube BID     polyethylene glycol  17 g Oral Daily     [START ON 3/6/2023] predniSONE  5 mg Oral Daily     protein modular  1 packet Per Feeding  Tube BID     sodium chloride (PF)  3 mL Intravenous Q8H     tacrolimus  1 mg Oral BID IS    Or     tacrolimus  1 mg Oral or NG Tube BID IS     valGANciclovir  450 mg Oral Once per day on     Or     valGANciclovir  450 mg Oral or NG Tube Once per day on        dextrose       dextrose Stopped (23 1400)     insulin regular Stopped (23 1014)     BETA BLOCKER NOT PRESCRIBED         Physical Exam   Temp  Av.9  F (36.6  C)  Min: 95.5  F (35.3  C)  Max: 99.5  F (37.5  C)  Arterial Line BP  Min: 78/56  Max: 126/60  Arterial Line MAP (mmHg)  Av.4 mmHg  Min: 56 mmHg  Max: 89 mmHg      Pulse  Av.9  Min: 67  Max: 111 Resp  Av.7  Min: 10  Max: 34  FiO2 (%)  Av.7 %  Min: 30 %  Max: 50 %  SpO2  Av.4 %  Min: 84 %  Max: 100 %    CVP (mmHg): 8 mmHgBP 139/80   Pulse 101   Temp 98  F (36.7  C) (Axillary)   Resp 16   Wt 78.9 kg (173 lb 15.1 oz)   SpO2 99%   BMI 28.95 kg/m     Date 23 0700 - 23 0659   Shift 2660-8531 9129-7436 1562-6367 24 Hour Total   INTAKE   I.V. 737.04   737.04   IV Piggyback 500   500   Blood Components 948   948   Shift Total(mL/kg) 2185.04(27.14)   2185.04(27.14)   OUTPUT   Urine 3   3   Emesis/NG output 100   100   Drains 10   10   Shift Total(mL/kg) 113(1.4)   113(1.4)   Weight (kg) 80.5 80.5 80.5 80.5      Admit Weight: 65.9 kg (145 lb 4.5 oz)     GENERAL APPEARANCE: Awake, confused  HENT: mouth without ulcers or lesions  LYMPHATICS: no cervical or supraclavicular nodes  Pulm: CTAB  CV: regular rhythm, normal rate, no rub, no murmur  EDEMA: trace LE edema bilaterally  ABDOMEN: soft, nondistended, nontender, bowel sounds normal, surgical incision noted - no active bleeding.  MS: extremities normal - no gross deformities noted, no evidence of inflammation in joints, no muscle tenderness  SKIN: no rash  NEURO: no focal deficits  DIALYSIS ACCESS: R internal jugular     Data   CMP  Recent Labs   Lab 23  1013 23  0903 23  0800  03/05/23  0650 03/05/23 0358 03/05/23 0354 03/04/23 2057 03/04/23 2002 03/04/23 1159 03/04/23 1155 03/04/23 0342 03/04/23 0339 03/03/23 0359 03/03/23 0354 03/03/23 0219 03/03/23  0130 03/02/23 1951 03/02/23  1945 03/02/23  1202 03/02/23  1157   NA  --   --   --   --   --  134*  --  137  --  134*  --  136   < > 137  --  141  --  144  --  139   POTASSIUM  --   --   --   --   --  3.7  --  3.8  --  3.7  --  3.8   < > 4.7  --  4.6  --  3.4  --  4.9   CHLORIDE  --   --   --   --   --  99  --  101  --  100  --  101   < > 105  --  108*  --  117*  --  102   CO2  --   --   --   --   --  23  --  24  --  24  --  25   < > 24  --  24  --  18*  --  24   ANIONGAP  --   --   --   --   --  12  --  12  --  10  --  10   < > 8  --  9  --  9  --  13   GLC 92 127* 142* 169*   < > 180*   < > 146*   < > 175*   < > 102*   < > 144*   < > 142*   < > 101*   < > 135*   BUN  --   --   --   --   --  73.5*  --  61.2*  --  53.5*  --  39.4*   < > 36.5*  --  34.8*  --  26.4*  --  37.6*   CR  --   --   --   --   --  2.40*  --  2.11*  --  1.87*  --  1.62*   < > 1.33*  --  1.26*  --  0.98  --  1.39*   GFRESTIMATED  --   --   --   --   --  37*  --  43*  --  49*  --  59*   < > 74  --  79  --  >90  --  70   SARTHAK  --   --   --   --   --  9.2  --  9.4  --  8.7  --  8.6   < > 8.6  --  8.4*  --  6.3*  --  9.5   MAG  --   --   --   --   --   --   --   --   --   --   --   --   --  2.4*  --  2.2  --  1.6*  --  2.1   PHOS  --   --   --   --   --  5.0*  --  4.8*  --  4.2  --  4.0   < > 5.2*  --  5.0*  --  3.9  --  5.7*   PROTTOTAL  --   --   --   --   --  4.8*  --  5.5*  --  4.8*  --  5.1*   < > 5.0*  --   --   --  3.7*  --  5.0*   ALBUMIN  --   --   --   --   --  3.0*  --  3.5  --  3.1*  --  3.2*   < > 3.3*  --   --   --  2.4*  --  3.3*   BILITOTAL  --   --   --   --   --  1.8*  --  1.9*  --  1.7*  --  1.7*   < > 2.6*  --   --   --  2.5*  --  4.3*   ALKPHOS  --   --   --   --   --  122  --  125  --  108  --  103   < > 84  --   --   --  51  --  60   AST   --   --   --   --   --  54*  --  75*  --  82*  --  105*   < > 262*  --   --   --  264*  --  497*   ALT  --   --   --   --   --  251*  --  322*  --  333*  --  388*   < > 546*  --   --   --  422*  --  636*    < > = values in this interval not displayed.     CBC  Recent Labs   Lab 03/05/23  0354 03/04/23 2002 03/04/23 1155 03/04/23  0339   HGB 8.7* 9.9* 9.0* 9.4*   WBC 11.0 13.9* 12.7* 15.2*   RBC 2.83* 3.22* 2.94* 3.05*   HCT 27.4* 31.1* 28.4* 28.9*   MCV 97 97 97 95   MCH 30.7 30.7 30.6 30.8   MCHC 31.8 31.8 31.7 32.5   RDW 18.9* 18.7* 18.6* 17.8*   PLT 68* 71* 57* 66*     INR  Recent Labs   Lab 03/05/23  0354 03/04/23 2002 03/04/23  1155 03/04/23  0339   INR 1.27* 1.32* 1.45* 1.38*   PTT 27 25 25 26     ABG  Recent Labs   Lab 03/05/23  0740 03/05/23  0354 03/04/23  2341 03/04/23 2002 03/04/23  1155 03/04/23  0944 03/04/23  0744 03/04/23  0339 03/01/23  1423 03/01/23  1317   PH  --   --   --   --   --  7.32* 7.30*  --  7.41 7.38   PCO2  --   --   --   --   --  56* 61*  --  37 42   PO2  --   --   --   --   --  83 61*  --  112* 225*   HCO3  --   --   --   --   --  29* 29*  --  23 25   O2PER 21 30 21 2   < > 30 2   < > 40.0 60.0    < > = values in this interval not displayed.      Urine Studies  Recent Labs   Lab Test 02/27/23  1616 02/24/23  1818 02/22/23  1421 02/19/23  2051   COLOR Yellow Light Yellow Yellow Yellow   APPEARANCE Clear Clear Clear Clear   URINEGLC Negative Negative Negative Negative   URINEBILI Negative Negative Negative Negative   URINEKETONE Negative Negative Negative Negative   SG 1.012 1.009 1.010 1.015   UBLD Negative Negative Negative Negative   URINEPH 5.5 5.0 5.0 5.0   PROTEIN 30* Negative Negative 30*   NITRITE Negative Negative Negative Negative   LEUKEST Negative Negative Negative Negative   RBCU <1 <1 1 26*   WBCU 2 1 2 16*     No lab results found.  PTH  No lab results found.  Iron Studies  Recent Labs   Lab Test 02/22/23  0610 02/20/23  0730 02/16/23  0543 12/20/22  0703  10/06/22  0958 04/07/22  0624   IRON 26*  --   --  249*  --  85   *  --   --   --   --   --    IRONSAT 19  --   --   --   --   --    ASCENCION 1,465* 1,335* 1,725* 2,207* 2,042* 423*       IMAGING:  All imaging studies reviewed by me.

## 2023-03-05 NOTE — PROGRESS NOTES
SURGICAL ICU PROGRESS NOTE  03/05/2023      PRIMARY TEAM: Transplant  PRIMARY PHYSICIAN: Dr. Sterling    REASON FOR CRITICAL CARE ADMISSION:  Hemodynamic monitoring and pressor requirements  ADMITTING PHYSICIAN: Dr. Parekh      ASSESSMENT: Dillon Salter is a 27 year old male with Asperger's, T2DM, alcohol related cirrhosis c/b hepatic encephalopathy, history of esophageal varices bleeding (5/2022, 1/2023) and rectal varices s/p sclerotherapy 1/2023, ascites presenting with hematemesis. Underwent EGD 2/11 showed oozing portal hypertensive gastropathy. He is now s/p DDLT 2/28/23 with 20L EBL. He is admitted to the SICU for post-op hemodynamic and respiratory monitoring. Requiring short term pressors, low urinary output intraoperatively with concern for possible ongoing oozing, he returned to the OR on 03/01 for washout and repair of bleeder. CRRT switched to iHD on 3.3.     Overnight  Pt having some delirium     Changes Today:  - Cdif  - Inc fiber  - Psych consult for paranoid delusions   - EKG   -VBG in the AM  - CXR in the AM  - Free water flushes 20q6hr       Neuro/ pain/ sedation:  - Monitor neurological status. Notify the MD for any acute changes in exam.  - Scheduled tylenol, robaxin (hold for somnolence), PRN oxy (hold for now) and dilaudid.     # Delirium   Consult psych     Pulmonary care:   #Hypoxia 2/2 to fluid overload  FiO2 (%): 30 %  Resp: 18  - Extubated on BiPAP  - Supplemental oxygen to keep saturation above 92 %.  - Incentive spirometer every 15- 30 minutes when awake.    - vbg in the AM      Cardiovascular:    #Shock, hypovolemic, improving  - Monitor hemodynamic status. MAP goal >65   - Off of pressors     GI care:   #ESLD 2/2 alcoholic cirrhosis  #Hx of esophageal varices   #Portal hypertensive gastropathy  #Esophageal ulceration  #Recurrent rectal varices  #Hx of hepatic encephalopathy   - Liver US: 7.2 x 1.8 x 7.3 right perihepatic hematoma  - Monitor LFTs all downtrending  - IV Protonix   -  Coreg 6.25mg BID   - Hold PTA meds on bumex 1 mg daily and spironolactone 100 mg daily  - GI and transplant following      Renal/ Fluids/ Electrolytes/ Nutrition:   #ASHISH   - Baseline Cr <1 (Cr: 2.4) CTM  - iHD MWF  - Current goal of I:O  - Goal of net negative 100-150 ml/hr  - Will continue to monitor intake and output  - NJ placed on 03/02, at goal of 40ml/hr.       Endocrine:    #Stress and steroid Induced hyperglycemia   #Type II Diabetes   -Hgb A1c  5.6%  -Insulin drip, keep till off steroids and tolerating PO      ID/ Antibiotics:  #Leukocytosis   - Afebrile. WBC: 11 yesterday  - Lactate: 0.6     #Day op Abs  - Zoyn x 48 hrs      #Tx prophylaxis   - Bactrim, Valcyte, Maritza     #Tx immunosuppression   - MMF, Tac, Pred taper      Heme:     #Acute on chronic blood loss anemia- stable  #Acute blood loss anemia  -Hemoglobin on 03/05 8.7  -Transfusion goals: Hemoglobin greater than 7, fibrinogen greater than 200, INR less than 2, platelets greater than 20 or sign/symptoms of hypoperfusion     Prophylaxis:    -Mechanical prophylaxis for DVT.   -No chemical DVT prophylaxis due to high risk of bleeding.  - PPIs      MSK:    #Chronic Pain   - PTA oxycodone   - PTA Tylenol   - PT and OT consulted. Appreciate recs.     General Cares/Prophylaxis:    DVT Prophylaxis: Pneumatic Compression Devices  GI Prophylaxis: PPI  Restraints: Restraints for medical healing needed: Not Applicable    Lines/ tubes/ drains:  - Left radial art line- remove  - Right  IJ MAC lines  - Right IJ  Dialysis catheter   - 2 JUANA drain   - Peripheral IV    Disposition:  - Surgical ICU    Patient seen and discussed with staff.    Dillon Morse MD  Surgery Resident  PGY2        ====================================    TODAY'S SUBJECTIVE/INTERVAL HISTORY:     Pt awake and alert. Minimal pain.     OBJECTIVE:     Temp:  [96.5  F (35.8  C)-97.7  F (36.5  C)] 97.7  F (36.5  C)  Pulse:  [] 102  Resp:  [10-18] 18  BP: (120-162)/() 132/69  MAP:  [107  mmHg-115 mmHg] 115 mmHg  Arterial Line BP: (119-130)/(101-110) 130/110  FiO2 (%):  [30 %] 30 %  SpO2:  [94 %-100 %] 98 %  FiO2 (%): 30 %  Resp: 18      I/O last 3 completed shifts:  In: 1654.14 [I.V.:444.14; NG/GT:330]  Out: 3088 [Drains:88; Other:3000]    General/Neuro: BiPAP awake and alert  CV: RRR  Pulm: On room air this AM  Abd: soft; nondistended, incision not saturating  Extremities: no edema, following commands  Skin: warm and well-perfused.     LABS:   Arterial Blood Gases   Recent Labs   Lab 03/04/23  0944 03/04/23  0744 03/01/23  1423 03/01/23  1317   PH 7.32* 7.30* 7.41 7.38   PCO2 56* 61* 37 42   PO2 83 61* 112* 225*   HCO3 29* 29* 23 25     Complete Blood Count   Recent Labs   Lab 03/05/23  0354 03/04/23 2002 03/04/23  1155 03/04/23  0339   WBC 11.0 13.9* 12.7* 15.2*   HGB 8.7* 9.9* 9.0* 9.4*   PLT 68* 71* 57* 66*     Basic Metabolic Panel  Recent Labs   Lab 03/05/23  0650 03/05/23  0608 03/05/23  0358 03/05/23  0354 03/04/23 2057 03/04/23 2002 03/04/23  1159 03/04/23  1155 03/04/23  0342 03/04/23  0339   NA  --   --   --  134*  --  137  --  134*  --  136   POTASSIUM  --   --   --  3.7  --  3.8  --  3.7  --  3.8   CHLORIDE  --   --   --  99  --  101  --  100  --  101   CO2  --   --   --  23  --  24  --  24  --  25   BUN  --   --   --  73.5*  --  61.2*  --  53.5*  --  39.4*   CR  --   --   --  2.40*  --  2.11*  --  1.87*  --  1.62*   * 162* 172* 180*   < > 146*   < > 175*   < > 102*    < > = values in this interval not displayed.     Liver Function Tests  Recent Labs   Lab 03/05/23  0354 03/04/23 2002 03/04/23  1155 03/04/23  0339   AST 54* 75* 82* 105*   * 322* 333* 388*   ALKPHOS 122 125 108 103   BILITOTAL 1.8* 1.9* 1.7* 1.7*   ALBUMIN 3.0* 3.5 3.1* 3.2*   INR 1.27* 1.32* 1.45* 1.38*     Pancreatic Enzymes  Recent Labs   Lab 03/01/23  0609 02/27/23  1057   LIPASE 19  --    AMYLASE 62 12*     Coagulation Profile  Recent Labs   Lab 03/05/23  0354 03/04/23 2002 03/04/23  1155  03/04/23  0339   INR 1.27* 1.32* 1.45* 1.38*   PTT 27 25 25 26         IMAGING:   Recent Results (from the past 24 hour(s))   XR Chest Port 1 View    Narrative    Exam: XR CHEST PORT 1 VIEW, 3/4/2023 10:34 AM    Indication: Hypercarbia    Comparison: 3/2/2023 chest x-ray    Findings:   Upright, AP view the chest. Patient has been extubated. Right IJ  central venous catheters are stable in position. Superior approach  Vancleve-Ronna sheath has been removed. Low lung volumes. Cardiac  silhouette is enlarged. More prominent pulmonary venous congestion. No  pneumothorax. Stable size of right pleural effusion.      Impression    Impression:   1. Position of support devices as described in report.  2. Low lung volumes with increased size of cardiac silhouette and  prominent pulmonary venous congestion, concerning for pulmonary edema.    I have personally reviewed the examination and initial interpretation  and I agree with the findings.    TERRI ROGERS MD         SYSTEM ID:  [id number]

## 2023-03-05 NOTE — PLAN OF CARE
Major Shift Events:  More alert tonight. A&Ox4. Pupils equal and reactive. Confused at times. Follows commands. PRN oxycodone given for pain x2. ST, HR 100s. Afebrile. BP stable. BiPAP while sleeping. VBG q4h. Lungs sounds clear/diminshed. NJ with TF @ GR 40ml/hr, 30q4h FWF. Insulin gtt. Continues to have loose stools. Bladder scanned for 34mls. HD run last evening, 3L removed. JUANA x1. Ostomy bag over place over previous R JUANA for drainage. Interdry in place. SICU notified on hgb drop.     Plan: Continue to monitor, notify team for changes or concerns.     For vital signs and complete assessments, please see documentation flowsheets.

## 2023-03-05 NOTE — PROGRESS NOTES
Transplant Surgery  Inpatient Daily Progress Note  2023    Assessment & Plan: Dillon Salter is a 27 year old male with a past medical history of Asperger's, DM2, and alcoholic cirrhosis c/b esophageal varices and hepatic encephalopathy. He is now s/p  donor liver transplant without stent on 23 with Dr. Sterling. Return to OR for washout and repair of arterial bleeding on 3/1/23.    s/p DDLT 23: POD #5. Tbili 1.8 (1.9), enzymes improved today so will get US. LA 0.6.   -3/1 US: resolution of hematoma, patent vessels  -3/3 US: Patent, resolution of perihepatic hematoma with new perihepatic hematoma up to 9 cm.  Ammonia 11 (Checked in light of AMS)    Immunosuppression management:  Induction: Steroid taper and basiliximab x2 per renal sparing protocol.  Maintenance:   - mg BID  -Tacrolimus goal level 5-8 due to renal sparing protocol, decreased dose to 1 mg BID.  -Prednisone 5mg daily after taper.    Neuro/Psych:  Acute post op pain; Chronic musculoskeletal pain: On oxycodone PTA. Currently on hold due to sedation  MDD, Anxiety, Autism spectrum: Mother is caretaker, lives with parents. Restart PTA fluoxetine as able.     Hematology:   Anemia of chronic disease/Acute blood loss: Hgb now stable ~9.  Thrombocytopenia: PLT 68, no need to transfuse further.  Coagulopathy: INR 1.3  Will not plan for subcutaneous heparin for DVT prophylaxis since patient already coagulopathic    Cardiorespiratory:   Post op ventilatory support/hypercapnia: Extubated 3/3. Lethargic and hypercapnic 3/4 AM, improved with BiPAP. On BiPAP overnight.  Hypovolemic/hemorrhagic shock: Secondary to bleeding. Resolved.    GI/Nutrition:   Severe malnutrition in the context of acute illness: Nutrition consulted. FT placed, receiving Novasource Renal    Endocrine:   DM2; Steroid induced hyperglycemia: Could transition off insulin gtt.     Fluid/Electrolytes/Neph:   ASHISH: Present prior to transplant (prerenal-hypovolemia 2/2  blood loss) now exacerbated by liver transplant, shock. CRRT started 3/1/23. Tolerated iHD 3/4, will plan next for MWF.  Hyperkalemia: Resolved with CRRT.    : Negron removed. Check periodic bladder scans.    Infectious disease: No issues.     Prophylaxis: DVT (mechanical), fall, GI (PPI), fungal (Initially received fluconazole, change to Micafungin d/t CRRT/re-operation), viral (Valcyte), pneumocystis (Bactrim not yet restarted, hold off with hyperkalemia), jalen-op (Zosyn)    Disposition: SICU, if respiratory/mental status improving or stable tomorrow should be able to transfer to the floor    CHRISTA/Fellow/Resident Provider: Rosita Cohn PA-C [id number]  20 minutes spent on chart review, exam, and discussion with Nephrology/SICU.    Faculty: Thelma Sterling M.D.    __________________________________________________________________  Transplant History:    2/28/2023 (Liver), Postoperative day: 5     Interval History:  Overnight events: Lethargy improved, still slightly confused with some hallucinations this AM, though improving by this afternoon    ROS:   A 10-point review of systems was negative except as noted above.    Curent Meds:    acetaminophen  650 mg Oral Q8H     aspirin  325 mg Per Feeding Tube Daily     basiliximab (SIMULECT) infusion  20 mg Intravenous Once     FLUoxetine  60 mg Oral At Bedtime     [Held by provider] methocarbamol  500 mg Oral 4x Daily     micafungin  100 mg Intravenous Q24H     multivitamin RENAL  1 tablet Oral Daily     mycophenolate  750 mg Oral BID IS    Or     mycophenolate  750 mg Oral or NG Tube BID IS     pantoprazole  40 mg Per Feeding Tube BID     polyethylene glycol  17 g Oral Daily     [START ON 3/6/2023] predniSONE  5 mg Oral Daily     protein modular  1 packet Per Feeding Tube BID     sodium chloride (PF)  3 mL Intravenous Q8H     tacrolimus  1 mg Oral BID IS    Or     tacrolimus  1 mg Oral or NG Tube BID IS     valGANciclovir  450 mg Oral Once per day on Tue Fri    Or      valGANciclovir  450 mg Oral or NG Tube Once per day on Tue Fri       Physical Exam:     Admit Weight: 65.9 kg (145 lb 4.5 oz)    Current Vitals:   /77   Pulse 102   Temp 98  F (36.7  C) (Axillary)   Resp 16   Wt 78.9 kg (173 lb 15.1 oz)   SpO2 98%   BMI 28.95 kg/m      Vital sign ranges:    Temp:  [96.5  F (35.8  C)-98  F (36.7  C)] 98  F (36.7  C)  Pulse:  [] 102  Resp:  [10-18] 16  BP: (120-162)/() 138/77  FiO2 (%):  [30 %] 30 %  SpO2:  [95 %-100 %] 98 %    General Appearance: No acute distress  Skin: warm, dry, jaundice  Heart: Perfused  Lungs: Nonlabored resps on RA  Abdomen: abdomen soft, non-distended. Incision c/d/i. L JUANA serosang, right removed  : Negron removed  Extremities: edema: generalized +1-2  Neurologic: Tremor absent. Oriented x 3, was having some hallucinations this AM    Frailty Scores     Frailty Scores 12/20/2022    Final Score Frail    Final Score Number 5          Data:   CMP  Recent Labs   Lab 03/05/23  1213 03/05/23  1136 03/05/23  0358 03/05/23  0354 03/04/23  2057 03/04/23 2002 03/04/23  0342 03/04/23  0339 03/03/23  0359 03/03/23  0354 03/03/23  0219 03/03/23  0130 03/01/23  0740 03/01/23  0609 02/27/23  1406 02/27/23  1057   NA  --   --   --  134*  --  137   < > 136   < > 137  --  141   < > 142   < >  --    POTASSIUM  --   --   --  3.7  --  3.8   < > 3.8   < > 4.7  --  4.6   < > 5.7*   < >  --    CHLORIDE  --   --   --  99  --  101   < > 101   < > 105  --  108*   < > 103   < >  --    CO2  --   --   --  23  --  24   < > 25   < > 24  --  24   < > 25   < >  --    * 105*   < > 180*   < > 146*   < > 102*   < > 144*   < > 142*   < > 114*   < >  --    BUN  --   --   --  73.5*  --  61.2*   < > 39.4*   < > 36.5*  --  34.8*   < > 60.2*   < >  --    CR  --   --   --  2.40*  --  2.11*   < > 1.62*   < > 1.33*  --  1.26*   < > 1.67*   < >  --    GFRESTIMATED  --   --   --  37*  --  43*   < > 59*   < > 74  --  79   < > 56*   < >  --    SARTHAK  --   --   --  9.2  --  9.4    < > 8.6   < > 8.6  --  8.4*   < > 9.4   < >  --    ICAW  --   --   --  5.1  --   --   --  5.0  --  4.8  --   --    < > 4.8   < >  --    MAG  --   --   --   --   --   --   --   --   --  2.4*  --  2.2   < > 2.1   < > 2.7*   PHOS  --   --   --  5.0*  --  4.8*   < > 4.0   < > 5.2*  --  5.0*   < > 6.7*   < > 3.1   AMYLASE  --   --   --   --   --   --   --   --   --   --   --   --   --  62  --  12*   LIPASE  --   --   --   --   --   --   --   --   --   --   --   --   --  19  --   --    ALBUMIN  --   --   --  3.0*  --  3.5   < > 3.2*   < > 3.3*  --   --    < > 2.8*   < >  --    BILITOTAL  --   --   --  1.8*  --  1.9*   < > 1.7*   < > 2.6*  --   --    < > 4.9*   < >  --    ALKPHOS  --   --   --  122  --  125   < > 103   < > 84  --   --    < > 43   < >  --    AST  --   --   --  54*  --  75*   < > 105*   < > 262*  --   --    < > 347*   < >  --    ALT  --   --   --  251*  --  322*   < > 388*   < > 546*  --   --    < > 268*   < >  --     < > = values in this interval not displayed.     CBC  Recent Labs   Lab 03/05/23  0354 03/04/23 2002 02/28/23  1850 02/28/23  1703   HGB 8.7* 9.9*   < > 9.8*   WBC 11.0 13.9*   < > 6.2   PLT 68* 71*   < > 94*   A1C  --   --   --  6.1*    < > = values in this interval not displayed.

## 2023-03-05 NOTE — PROGRESS NOTES
Major Shift Events: AOX4, able to make needs known. pupils equal reactive, generalized weakness. Denies pain, continues to receive scheduled tylenol. Pt continues to have visual hallucinations of seeing people in the room that aren't present. Re-oriented frequently. Sinus tachy, afebrile. LS clear, on room air. VBG's collected. abd soft, multiple loose stool, C-diff sample sent. JUANA removed by transplant PA, site leaking. Wound ostomy bag applied to protect pt skin. No urine noted. Mother at bedside, attentive to pt needs.   Plan: continue POC  For vital signs and complete assessments, please see documentation flowsheets.

## 2023-03-05 NOTE — PROGRESS NOTES
Psychiatry consult received. Chart reviewed.   Pt to be seen at by Dr. Huertas on 3/6/23.   Recommend scheduling melatonin 3 mg q HS to help mitigate further delirium.     Delirium precautions:    Up during the day with lights on    Lights off at night, avoid interruptions during the night as much as possible    Family visits    Encourage wearing glasses    Reorientation    Avoid opioids, benzodiazepines, anticholinergics as much as possible.     Continue to ensure proper nutrition, fluid and electrolyte balance. Monitor for infections, hypoxia, metabolic derangements, or other causes of delirium.

## 2023-03-06 ENCOUNTER — APPOINTMENT (OUTPATIENT)
Dept: SPEECH THERAPY | Facility: CLINIC | Age: 28
DRG: 005 | End: 2023-03-06
Payer: COMMERCIAL

## 2023-03-06 ENCOUNTER — APPOINTMENT (OUTPATIENT)
Dept: GENERAL RADIOLOGY | Facility: CLINIC | Age: 28
DRG: 005 | End: 2023-03-06
Attending: STUDENT IN AN ORGANIZED HEALTH CARE EDUCATION/TRAINING PROGRAM
Payer: COMMERCIAL

## 2023-03-06 ENCOUNTER — APPOINTMENT (OUTPATIENT)
Dept: OCCUPATIONAL THERAPY | Facility: CLINIC | Age: 28
DRG: 005 | End: 2023-03-06
Attending: STUDENT IN AN ORGANIZED HEALTH CARE EDUCATION/TRAINING PROGRAM
Payer: COMMERCIAL

## 2023-03-06 LAB
ALBUMIN SERPL BCG-MCNC: 2.8 G/DL (ref 3.5–5.2)
ALP SERPL-CCNC: 130 U/L (ref 40–129)
ALT SERPL W P-5'-P-CCNC: 181 U/L (ref 10–50)
ANION GAP SERPL CALCULATED.3IONS-SCNC: 12 MMOL/L (ref 7–15)
APTT PPP: 24 SECONDS (ref 22–38)
AST SERPL W P-5'-P-CCNC: 46 U/L (ref 10–50)
ATRIAL RATE - MUSE: 101 BPM
BASE EXCESS BLDV CALC-SCNC: 2.9 MMOL/L (ref -7.7–1.9)
BILIRUB DIRECT SERPL-MCNC: 1.02 MG/DL (ref 0–0.3)
BILIRUB SERPL-MCNC: 1.5 MG/DL
BUN SERPL-MCNC: 96 MG/DL (ref 6–20)
CA-I BLD-MCNC: 5.2 MG/DL (ref 4.4–5.2)
CALCIUM SERPL-MCNC: 9 MG/DL (ref 8.6–10)
CHLORIDE SERPL-SCNC: 100 MMOL/L (ref 98–107)
CREAT SERPL-MCNC: 2.4 MG/DL (ref 0.67–1.17)
DEPRECATED HCO3 PLAS-SCNC: 24 MMOL/L (ref 22–29)
DIASTOLIC BLOOD PRESSURE - MUSE: NORMAL MMHG
ERYTHROCYTE [DISTWIDTH] IN BLOOD BY AUTOMATED COUNT: 19.7 % (ref 10–15)
FIBRINOGEN PPP-MCNC: 282 MG/DL (ref 170–490)
GFR SERPL CREATININE-BSD FRML MDRD: 37 ML/MIN/1.73M2
GLUCOSE BLDC GLUCOMTR-MCNC: 100 MG/DL (ref 70–99)
GLUCOSE BLDC GLUCOMTR-MCNC: 105 MG/DL (ref 70–99)
GLUCOSE BLDC GLUCOMTR-MCNC: 106 MG/DL (ref 70–99)
GLUCOSE BLDC GLUCOMTR-MCNC: 107 MG/DL (ref 70–99)
GLUCOSE BLDC GLUCOMTR-MCNC: 111 MG/DL (ref 70–99)
GLUCOSE BLDC GLUCOMTR-MCNC: 112 MG/DL (ref 70–99)
GLUCOSE BLDC GLUCOMTR-MCNC: 114 MG/DL (ref 70–99)
GLUCOSE BLDC GLUCOMTR-MCNC: 114 MG/DL (ref 70–99)
GLUCOSE BLDC GLUCOMTR-MCNC: 115 MG/DL (ref 70–99)
GLUCOSE BLDC GLUCOMTR-MCNC: 119 MG/DL (ref 70–99)
GLUCOSE BLDC GLUCOMTR-MCNC: 121 MG/DL (ref 70–99)
GLUCOSE BLDC GLUCOMTR-MCNC: 121 MG/DL (ref 70–99)
GLUCOSE BLDC GLUCOMTR-MCNC: 136 MG/DL (ref 70–99)
GLUCOSE BLDC GLUCOMTR-MCNC: 141 MG/DL (ref 70–99)
GLUCOSE BLDC GLUCOMTR-MCNC: 142 MG/DL (ref 70–99)
GLUCOSE BLDC GLUCOMTR-MCNC: 144 MG/DL (ref 70–99)
GLUCOSE BLDC GLUCOMTR-MCNC: 144 MG/DL (ref 70–99)
GLUCOSE BLDC GLUCOMTR-MCNC: 146 MG/DL (ref 70–99)
GLUCOSE BLDC GLUCOMTR-MCNC: 152 MG/DL (ref 70–99)
GLUCOSE BLDC GLUCOMTR-MCNC: 158 MG/DL (ref 70–99)
GLUCOSE BLDC GLUCOMTR-MCNC: 159 MG/DL (ref 70–99)
GLUCOSE BLDC GLUCOMTR-MCNC: 161 MG/DL (ref 70–99)
GLUCOSE SERPL-MCNC: 119 MG/DL (ref 70–99)
HCO3 BLDV-SCNC: 29 MMOL/L (ref 21–28)
HCT VFR BLD AUTO: 26.5 % (ref 40–53)
HGB BLD-MCNC: 8.4 G/DL (ref 13.3–17.7)
INR PPP: 1.25 (ref 0.85–1.15)
INTERPRETATION ECG - MUSE: NORMAL
LACTATE SERPL-SCNC: 0.7 MMOL/L (ref 0.7–2)
MCH RBC QN AUTO: 31.2 PG (ref 26.5–33)
MCHC RBC AUTO-ENTMCNC: 31.7 G/DL (ref 31.5–36.5)
MCV RBC AUTO: 99 FL (ref 78–100)
O2/TOTAL GAS SETTING VFR VENT: 21 %
OXYHGB MFR BLDV: 73 % (ref 70–75)
P AXIS - MUSE: 57 DEGREES
PCO2 BLDV: 55 MM HG (ref 40–50)
PH BLDV: 7.34 [PH] (ref 7.32–7.43)
PHOSPHATE SERPL-MCNC: 4.5 MG/DL (ref 2.5–4.5)
PLATELET # BLD AUTO: 90 10E3/UL (ref 150–450)
PO2 BLDV: 41 MM HG (ref 25–47)
POTASSIUM SERPL-SCNC: 3.2 MMOL/L (ref 3.4–5.3)
PR INTERVAL - MUSE: 210 MS
PROT SERPL-MCNC: 4.5 G/DL (ref 6.4–8.3)
QRS DURATION - MUSE: 90 MS
QT - MUSE: 314 MS
QTC - MUSE: 407 MS
R AXIS - MUSE: 44 DEGREES
RBC # BLD AUTO: 2.69 10E6/UL (ref 4.4–5.9)
SODIUM SERPL-SCNC: 136 MMOL/L (ref 136–145)
SYSTOLIC BLOOD PRESSURE - MUSE: NORMAL MMHG
T AXIS - MUSE: 16 DEGREES
TACROLIMUS BLD-MCNC: 6.5 UG/L (ref 5–15)
TME LAST DOSE: NORMAL H
TME LAST DOSE: NORMAL H
VENTRICULAR RATE- MUSE: 101 BPM
WBC # BLD AUTO: 8 10E3/UL (ref 4–11)

## 2023-03-06 PROCEDURE — 250N000009 HC RX 250: Performed by: PHYSICIAN ASSISTANT

## 2023-03-06 PROCEDURE — 250N000013 HC RX MED GY IP 250 OP 250 PS 637: Performed by: SURGERY

## 2023-03-06 PROCEDURE — 85730 THROMBOPLASTIN TIME PARTIAL: CPT | Performed by: PHYSICIAN ASSISTANT

## 2023-03-06 PROCEDURE — 258N000003 HC RX IP 258 OP 636: Performed by: INTERNAL MEDICINE

## 2023-03-06 PROCEDURE — 999N000065 XR CHEST PORT 1 VIEW

## 2023-03-06 PROCEDURE — 99232 SBSQ HOSP IP/OBS MODERATE 35: CPT | Mod: 25 | Performed by: SURGERY

## 2023-03-06 PROCEDURE — 71045 X-RAY EXAM CHEST 1 VIEW: CPT | Mod: 26 | Performed by: RADIOLOGY

## 2023-03-06 PROCEDURE — 250N000013 HC RX MED GY IP 250 OP 250 PS 637: Performed by: NURSE PRACTITIONER

## 2023-03-06 PROCEDURE — 250N000009 HC RX 250: Performed by: STUDENT IN AN ORGANIZED HEALTH CARE EDUCATION/TRAINING PROGRAM

## 2023-03-06 PROCEDURE — 250N000013 HC RX MED GY IP 250 OP 250 PS 637

## 2023-03-06 PROCEDURE — 90937 HEMODIALYSIS REPEATED EVAL: CPT

## 2023-03-06 PROCEDURE — 82248 BILIRUBIN DIRECT: CPT | Performed by: SURGERY

## 2023-03-06 PROCEDURE — 02HV33Z INSERTION OF INFUSION DEVICE INTO SUPERIOR VENA CAVA, PERCUTANEOUS APPROACH: ICD-10-PCS | Performed by: SURGERY

## 2023-03-06 PROCEDURE — 250N000013 HC RX MED GY IP 250 OP 250 PS 637: Performed by: STUDENT IN AN ORGANIZED HEALTH CARE EDUCATION/TRAINING PROGRAM

## 2023-03-06 PROCEDURE — 85027 COMPLETE CBC AUTOMATED: CPT | Performed by: PHYSICIAN ASSISTANT

## 2023-03-06 PROCEDURE — 80197 ASSAY OF TACROLIMUS: CPT | Performed by: PHYSICIAN ASSISTANT

## 2023-03-06 PROCEDURE — 82805 BLOOD GASES W/O2 SATURATION: CPT

## 2023-03-06 PROCEDURE — 83605 ASSAY OF LACTIC ACID: CPT | Performed by: PHYSICIAN ASSISTANT

## 2023-03-06 PROCEDURE — 99222 1ST HOSP IP/OBS MODERATE 55: CPT | Performed by: PSYCHIATRY & NEUROLOGY

## 2023-03-06 PROCEDURE — 250N000011 HC RX IP 250 OP 636: Performed by: NURSE PRACTITIONER

## 2023-03-06 PROCEDURE — 99233 SBSQ HOSP IP/OBS HIGH 50: CPT | Mod: 24 | Performed by: INTERNAL MEDICINE

## 2023-03-06 PROCEDURE — 80053 COMPREHEN METABOLIC PANEL: CPT | Performed by: PHYSICIAN ASSISTANT

## 2023-03-06 PROCEDURE — 250N000011 HC RX IP 250 OP 636: Performed by: INTERNAL MEDICINE

## 2023-03-06 PROCEDURE — 92610 EVALUATE SWALLOWING FUNCTION: CPT | Mod: GN | Performed by: SPEECH-LANGUAGE PATHOLOGIST

## 2023-03-06 PROCEDURE — 200N000002 HC R&B ICU UMMC

## 2023-03-06 PROCEDURE — 250N000012 HC RX MED GY IP 250 OP 636 PS 637: Performed by: NURSE PRACTITIONER

## 2023-03-06 PROCEDURE — 250N000012 HC RX MED GY IP 250 OP 636 PS 637: Performed by: PHYSICIAN ASSISTANT

## 2023-03-06 PROCEDURE — P9045 ALBUMIN (HUMAN), 5%, 250 ML: HCPCS | Performed by: INTERNAL MEDICINE

## 2023-03-06 PROCEDURE — 97535 SELF CARE MNGMENT TRAINING: CPT | Mod: GO | Performed by: OCCUPATIONAL THERAPIST

## 2023-03-06 PROCEDURE — 258N000003 HC RX IP 258 OP 636: Performed by: NURSE PRACTITIONER

## 2023-03-06 PROCEDURE — 97530 THERAPEUTIC ACTIVITIES: CPT | Mod: GO | Performed by: OCCUPATIONAL THERAPIST

## 2023-03-06 PROCEDURE — 250N000012 HC RX MED GY IP 250 OP 636 PS 637: Performed by: SURGERY

## 2023-03-06 PROCEDURE — 36556 INSERT NON-TUNNEL CV CATH: CPT | Mod: GC | Performed by: SURGERY

## 2023-03-06 PROCEDURE — 84100 ASSAY OF PHOSPHORUS: CPT | Performed by: PHYSICIAN ASSISTANT

## 2023-03-06 PROCEDURE — 85384 FIBRINOGEN ACTIVITY: CPT | Performed by: PHYSICIAN ASSISTANT

## 2023-03-06 PROCEDURE — 82330 ASSAY OF CALCIUM: CPT | Performed by: SURGERY

## 2023-03-06 PROCEDURE — 85610 PROTHROMBIN TIME: CPT | Performed by: PHYSICIAN ASSISTANT

## 2023-03-06 RX ORDER — VALGANCICLOVIR 450 MG/1
450 TABLET, FILM COATED ORAL
Status: DISCONTINUED | OUTPATIENT
Start: 2023-03-06 | End: 2023-03-19

## 2023-03-06 RX ORDER — QUETIAPINE FUMARATE 25 MG/1
25 TABLET, FILM COATED ORAL ONCE
Status: COMPLETED | OUTPATIENT
Start: 2023-03-06 | End: 2023-03-06

## 2023-03-06 RX ORDER — VALGANCICLOVIR HYDROCHLORIDE 50 MG/ML
450 POWDER, FOR SOLUTION ORAL
Status: DISCONTINUED | OUTPATIENT
Start: 2023-03-06 | End: 2023-03-10

## 2023-03-06 RX ORDER — MYCOPHENOLATE MOFETIL 200 MG/ML
500 POWDER, FOR SUSPENSION ORAL 2 TIMES DAILY
Status: DISCONTINUED | OUTPATIENT
Start: 2023-03-06 | End: 2023-03-08

## 2023-03-06 RX ORDER — ACETAMINOPHEN 325 MG/1
650 TABLET ORAL EVERY 8 HOURS PRN
Status: DISCONTINUED | OUTPATIENT
Start: 2023-03-06 | End: 2023-03-19 | Stop reason: HOSPADM

## 2023-03-06 RX ORDER — GUAR GUM
1 PACKET (EA) ORAL 2 TIMES DAILY
Status: DISCONTINUED | OUTPATIENT
Start: 2023-03-06 | End: 2023-03-06

## 2023-03-06 RX ORDER — SULFAMETHOXAZOLE AND TRIMETHOPRIM 400; 80 MG/1; MG/1
1 TABLET ORAL
Status: DISCONTINUED | OUTPATIENT
Start: 2023-03-06 | End: 2023-03-19 | Stop reason: HOSPADM

## 2023-03-06 RX ORDER — VITAMIN B COMPLEX
25 TABLET ORAL DAILY
Status: DISCONTINUED | OUTPATIENT
Start: 2023-03-06 | End: 2023-03-19 | Stop reason: HOSPADM

## 2023-03-06 RX ORDER — ALBUMIN (HUMAN) 12.5 G/50ML
50 SOLUTION INTRAVENOUS
Status: DISCONTINUED | OUTPATIENT
Start: 2023-03-06 | End: 2023-03-06

## 2023-03-06 RX ORDER — MYCOPHENOLATE MOFETIL 250 MG/1
500 CAPSULE ORAL 2 TIMES DAILY
Status: DISCONTINUED | OUTPATIENT
Start: 2023-03-06 | End: 2023-03-08

## 2023-03-06 RX ORDER — NICOTINE POLACRILEX 4 MG
15-30 LOZENGE BUCCAL
Status: DISCONTINUED | OUTPATIENT
Start: 2023-03-06 | End: 2023-03-07

## 2023-03-06 RX ORDER — DEXTROSE MONOHYDRATE 25 G/50ML
25-50 INJECTION, SOLUTION INTRAVENOUS
Status: DISCONTINUED | OUTPATIENT
Start: 2023-03-06 | End: 2023-03-07

## 2023-03-06 RX ADMIN — HUMAN INSULIN 2 UNITS/HR: 100 INJECTION, SOLUTION SUBCUTANEOUS at 20:06

## 2023-03-06 RX ADMIN — Medication 40 MG: at 08:19

## 2023-03-06 RX ADMIN — INSULIN GLARGINE 10 UNITS: 100 INJECTION, SOLUTION SUBCUTANEOUS at 21:38

## 2023-03-06 RX ADMIN — Medication 40 MG: at 21:40

## 2023-03-06 RX ADMIN — OXYCODONE HYDROCHLORIDE 2.5 MG: 5 TABLET ORAL at 22:24

## 2023-03-06 RX ADMIN — HUMAN INSULIN 1 UNITS/HR: 100 INJECTION, SOLUTION SUBCUTANEOUS at 09:07

## 2023-03-06 RX ADMIN — OXYCODONE HYDROCHLORIDE 2.5 MG: 5 TABLET ORAL at 13:25

## 2023-03-06 RX ADMIN — Medication 25 MCG: at 12:54

## 2023-03-06 RX ADMIN — SODIUM CHLORIDE 300 ML: 9 INJECTION, SOLUTION INTRAVENOUS at 17:29

## 2023-03-06 RX ADMIN — B-COMPLEX W/ C & FOLIC ACID TAB 1 MG 1 TABLET: 1 TAB at 08:19

## 2023-03-06 RX ADMIN — MYCOPHENOLATE MOFETIL 500 MG: 200 POWDER, FOR SUSPENSION ORAL at 21:39

## 2023-03-06 RX ADMIN — HEPARIN SODIUM 1600 UNITS: 1000 INJECTION INTRAVENOUS; SUBCUTANEOUS at 21:26

## 2023-03-06 RX ADMIN — MYCOPHENOLATE MOFETIL 750 MG: 200 POWDER, FOR SUSPENSION ORAL at 08:23

## 2023-03-06 RX ADMIN — HUMAN INSULIN 3 UNITS/HR: 100 INJECTION, SOLUTION SUBCUTANEOUS at 19:06

## 2023-03-06 RX ADMIN — HEPARIN SODIUM 1600 UNITS: 1000 INJECTION, SOLUTION INTRAVENOUS; SUBCUTANEOUS at 21:27

## 2023-03-06 RX ADMIN — PREDNISONE 5 MG: 5 TABLET ORAL at 08:19

## 2023-03-06 RX ADMIN — ASPIRIN 325 MG ORAL TABLET 325 MG: 325 PILL ORAL at 08:19

## 2023-03-06 RX ADMIN — OXYCODONE HYDROCHLORIDE 2.5 MG: 5 TABLET ORAL at 00:57

## 2023-03-06 RX ADMIN — TACROLIMUS 1 MG: 5 CAPSULE ORAL at 08:23

## 2023-03-06 RX ADMIN — MICAFUNGIN 100 MG: 10 INJECTION, POWDER, LYOPHILIZED, FOR SOLUTION INTRAVENOUS at 08:23

## 2023-03-06 RX ADMIN — ALBUMIN HUMAN 250 ML: 0.05 INJECTION, SOLUTION INTRAVENOUS at 17:30

## 2023-03-06 RX ADMIN — Medication 3 MG: at 21:38

## 2023-03-06 RX ADMIN — TACROLIMUS 1 MG: 5 CAPSULE ORAL at 21:40

## 2023-03-06 RX ADMIN — ACETAMINOPHEN 650 MG: 325 TABLET, FILM COATED ORAL at 00:57

## 2023-03-06 RX ADMIN — Medication: at 17:31

## 2023-03-06 RX ADMIN — SULFAMETHOXAZOLE AND TRIMETHOPRIM 1 TABLET: 400; 80 TABLET ORAL at 21:37

## 2023-03-06 RX ADMIN — VALGANCICLOVIR HYDROCHLORIDE 450 MG: 50 POWDER, FOR SOLUTION ORAL at 21:40

## 2023-03-06 RX ADMIN — ACETAMINOPHEN 650 MG: 325 TABLET ORAL at 22:32

## 2023-03-06 RX ADMIN — ACETAMINOPHEN 650 MG: 325 TABLET, FILM COATED ORAL at 09:03

## 2023-03-06 RX ADMIN — FLUOXETINE HYDROCHLORIDE 60 MG: 40 CAPSULE ORAL at 21:37

## 2023-03-06 RX ADMIN — HUMAN INSULIN 3 UNITS/HR: 100 INJECTION, SOLUTION SUBCUTANEOUS at 18:08

## 2023-03-06 ASSESSMENT — ACTIVITIES OF DAILY LIVING (ADL)
ADLS_ACUITY_SCORE: 41

## 2023-03-06 NOTE — PROCEDURES
Procedure Note  DATE OF OPERATION: 3/6/2023  PREOPERATIVE DIAGNOSIS: Liver transplant  Need for central access  POSTOPERATIVE DIAGNOSIS: liver transplant need for central access  OPERATION PERFORMED: Central Venous Catheter Placement  ANESTHESIA: 5 CC  EBL: 5 mL  COMPLICATIONS: None  INDICATIONS FOR PROCEDURE:   The patient is a 27 year old male s/p DDLT, who requires a central line for IV access.   DESCRIPTION OF OPERATIVE PROCEDURE:   Pre-procedure consent for the procedure was obtained. A time out was performed. The patient was placed in Trendelenburg position. The right neck region was prepped and draped in sterile fashion. Anesthesia was achieved with lidocaine. Guide wire was advanced over the MAC line, nad position confirmed via US. MAC line was removed and tripple lumin was advanced over the wire. Wire was then removed. All ports easily flushed and aspirated. The catheter was sutured in place at 17 cm at the skin and a sterile dressing was applied. The patient tolerated the procedure without any hemodynamic compromise. A post-procedure x-ray shows the tip of the catheter within the SVC and no pneumothorax. Dr Lopes is the supervising surgeon and was available for assistance.  DISPOSITION: stable, OK to use central line    Dillon Mores MD  Surgery resident

## 2023-03-06 NOTE — CONSULTS
"          Initial Psychiatric Consult   Consult date: March 6, 2023         Reason for Consult, requesting source:    AMS, .   Requesting source: Thelma Sterling    Labs and imaging reviewed. Discussed with nursing.     Total time spent in chart review, patient interview and coordination of care; 65 min        HPI:   From post op ICU note:  \"Dillon Salter is a 27 year old male with Asperger's, T2DM, alcohol related cirrhosis c/b hepatic encephalopathy, history of esophageal varices bleeding (5/2022, 1/2023) and rectal varices s/p sclerotherapy 1/2023, ascites presenting with hematemesis. Underwent EGD 2/11 showed oozing portal hypertensive gastropathy. He is now s/p DDLT. He is admitted to the SICU for post-op hemodynamic and respiratory monitoring.  Was initially on nor epi as well as epi, has since been weaned off of both, low urinary output intraoperatively with concern for possible ongoing oozing, transplant aware\"    Over the past several days he has been more confused and having visual hallucinations, some voices and paranoia. Minimal agitation, but he has been restless.   With me he was minimizing his hallucinations; seemed quite guarded. He was not the best historian vs did not want to share information.         Past Psychiatric History:   He thinks he has been taking Prozac for about 3 years. This is for depression and he says that it works well. Also some anxiety, no panic.         Substance Use and History:   He quit drinking after a hospitalization 4/7/22. He had continued to drink for several months after becoming jaundiced. For more alcohol use history please refer to the addiction service note by Dr Lyssa Lutz 4/7/22. Former light smoker.   From her note:  \"1.  Liver cirrhosis, likely secondary to alcohol use -jaundice first noted about 2 months ago but patient has continued to drink.  T of liver shows heterogeneous nodular pattern in the liver with portal hypertension and splenomegaly.  NA-Meld " "score 26.  Unclear whether there may be some reversibility to disease but given his relatively young age and recent worsening function he may to recover some liver function with alcohol abstinence.  Has coagulopathy and hemoglobin has fallen since yesterday which may reflect fluid shifts for bleeding.     2.  Alcohol use disorder, severe -patient reports related to quit and will to CD treatment, but wants to do outpatien t due to being uncomfortable away from home and family.    Given his autism spectrum disorder, this would be reasonable.   IOP would be consideration.   Will need Rule 25 assessment as soon as possible, so he could start a program at the time he discharges.    He is also willing to start pharmacotherapy for alcohol use disorder and I recommended acamprosate, but would wait to start until his medical status is more stable. \"    He does not recall going to a CD treatment, but does remember the quit date of 4/7/22.  He did participate in an outpatient treatment by video. Also see evaluation by Marilin Cramer, [facility] 12/28/22.           Past Medical History:   PAST MEDICAL HISTORY:   Past Medical History:   Diagnosis Date     Acute on chronic anemia 04/08/2022     Alcohol use disorder      Alcohol use disorder, severe, dependence (H) 07/11/2022     Alcoholic cirrhosis of liver with ascites (H)      Alcoholic hepatitis      Autism spectrum disorder 04/08/2022    High functioning autistic.  26-year-old history of autism/Asperger's on the spectrum graduated high school at [employer] worked at  and then another  place until the Covid epidemic in 2020 lives with parents (Leticia and Wes) socially isolated drinks alcohol beer daily      Benign essential hypertension      Bleeding esophageal varices (H) 06/18/2022     Hepatic encephalopathy      Hyponatremia 07/28/2022     Major depressive disorder      Type II Diabetes        PAST SURGICAL HISTORY:   Past Surgical History:   Procedure " Laterality Date     BENCH LIVER  2023    Procedure: Bench liver;  Surgeon: Thelma Sterling MD;  Location: [facility] OR     COLONOSCOPY N/A 2023    Procedure: Colonoscopy;  Surgeon: Osman Montoya MD;  Location: [facility]     ENDOSCOPIC ULTRASOUND LOWER GASTROINTESTIONAL TRACT (GI) N/A 2023    Procedure: ULTRASOUND, LOWER GI TRACT, ENDOSCOPIC with glueing;  Surgeon: Osman Montoya MD;  Location: [facility]     ESOPHAGOSCOPY, GASTROSCOPY, DUODENOSCOPY (EGD), COMBINED N/A 2022    Procedure: ESOPHAGOGASTRODUODENOSCOPY (EGD) with esophageal banding;  Surgeon: Gayr Hurst MD;  Location: [facility]     ESOPHAGOSCOPY, GASTROSCOPY, DUODENOSCOPY (EGD), COMBINED N/A 2022    Procedure: ESOPHAGOGASTRODUODENOSCOPY;  Surgeon: Gavino Reza MD;  Location: [facility]     ESOPHAGOSCOPY, GASTROSCOPY, DUODENOSCOPY (EGD), COMBINED N/A 2023    Procedure: ESOPHAGOGASTRODUODENOSCOPY (EGD) with esophageal variceal banding;  Surgeon: Juan Boo MD;  Location: [facility]     ESOPHAGOSCOPY, GASTROSCOPY, DUODENOSCOPY (EGD), COMBINED N/A 2023    Procedure: ESOPHAGOGASTRODUODENOSCOPY (EGD);  Surgeon: Juan Boo MD;  Location: [facility]     ESOPHAGOSCOPY, GASTROSCOPY, DUODENOSCOPY (EGD), COMBINED N/A 2023    Procedure: Esophagoscopy, gastroscopy, duodenoscopy (EGD), combined;  Surgeon: Osman Montoya MD;  Location: [facility]     ESOPHAGOSCOPY, GASTROSCOPY, DUODENOSCOPY (EGD), COMBINED N/A 2023    Procedure: ESOPHAGOGASTRODUODENOSCOPY (EGD);  Surgeon: Leventhal, Thomas Michael, MD;  Location: [facility] OR     MIDLINE DOUBLE LUMEN PLACEMENT  2022          PICC TRIPLE LUMEN PLACEMENT  2022          RETURN LIVER TRANSPLANT N/A 3/1/2023    Procedure: RETURN TO OPERATING ROOM, AFTER LIVER TRANSPLANT;  Surgeon: Thelma Sterling MD;  Location: [facility] OR     TRANSPLANT LIVER RECIPIENT  DONOR N/A 2023    Procedure: Transplant liver recipient   donor;  Surgeon: Thelma Sterling MD;  Location:  OR             Family History:   FAMILY HISTORY:   Family History   Problem Relation Age of Onset     Hypertension Mother      Substance Abuse Mother      Thyroid Disease Mother      Heart Failure Father      Substance Abuse Father      Chronic Obstructive Pulmonary Disease Father      Substance Abuse Maternal Grandfather    Strong family history of alcohol use problems.         Social History:   He lives with his mother. No siblings. He did graduate HS, hasn't worked in a few years, is disabled.          Physical ROS:   The 10 point Review of Systems is negative other than noted in the HPI or here.           Medications:       sodium chloride 0.9%  300 mL Hemodialysis Machine Once     albumin human  250 mL Intravenous Once in dialysis/CRRT     aspirin  325 mg Per Feeding Tube Daily     cholecalciferol  25 mcg Per Feeding Tube Daily     fiber modular  1 packet Per Feeding Tube TID     FLUoxetine  60 mg Oral At Bedtime     sodium chloride (PF) 0.9%  10 mL Intracatheter Once in dialysis/CRRT    Followed by     heparin  1.3-2.6 mL Intracatheter Once in dialysis/CRRT     sodium chloride (PF) 0.9%  10 mL Intracatheter Once in dialysis/CRRT    Followed by     heparin  1.3-2.6 mL Intracatheter Once in dialysis/CRRT     melatonin  3 mg Oral QPM     micafungin  100 mg Intravenous Q24H     multivitamin RENAL  1 tablet Oral Daily     mycophenolate  500 mg Oral BID    Or     mycophenolate  500 mg Oral or NG Tube BID     - MEDICATION INSTRUCTIONS -   Does not apply Once     pantoprazole  40 mg Per Feeding Tube BID     predniSONE  5 mg Oral Daily     protein modular  1 packet Per Feeding Tube BID     sodium chloride (PF)  3 mL Intravenous Q8H     sulfamethoxazole-trimethoprim  1 tablet Oral or Feeding Tube Once per day on Mon Wed Fri     tacrolimus  1 mg Oral BID IS    Or     tacrolimus  1 mg Oral or NG Tube BID IS     valGANciclovir  450 mg Oral Once per day on Mon Fri    Or      valGANciclovir  450 mg Oral or NG Tube Once per day on Mon Fri     cholecalciferol  25 mcg Oral or Feeding Tube Daily              Allergies:   No Known Allergies       Labs:     Recent Results (from the past 48 hour(s))   Glucose by meter    Collection Time: 03/04/23  2:22 PM   Result Value Ref Range    GLUCOSE BY METER POCT 146 (H) 70 - 99 mg/dL   Glucose by meter    Collection Time: 03/04/23  3:05 PM   Result Value Ref Range    GLUCOSE BY METER POCT 121 (H) 70 - 99 mg/dL   Glucose by meter    Collection Time: 03/04/23  4:07 PM   Result Value Ref Range    GLUCOSE BY METER POCT 127 (H) 70 - 99 mg/dL   Lactic acid whole blood    Collection Time: 03/04/23  4:10 PM   Result Value Ref Range    Lactic Acid 1.0 0.7 - 2.0 mmol/L   Blood gas venous    Collection Time: 03/04/23  4:10 PM   Result Value Ref Range    pH Venous 7.33 7.32 - 7.43    pCO2 Venous 55 (H) 40 - 50 mm Hg    pO2 Venous 41 25 - 47 mm Hg    Bicarbonate Venous 29 (H) 21 - 28 mmol/L    Base Excess/Deficit (+/-) 2.1 (H) -7.7 - 1.9 mmol/L    FIO2 2    Glucose by meter    Collection Time: 03/04/23  5:10 PM   Result Value Ref Range    GLUCOSE BY METER POCT 127 (H) 70 - 99 mg/dL   Glucose by meter    Collection Time: 03/04/23  6:52 PM   Result Value Ref Range    GLUCOSE BY METER POCT 127 (H) 70 - 99 mg/dL   Glucose by meter    Collection Time: 03/04/23  7:50 PM   Result Value Ref Range    GLUCOSE BY METER POCT 155 (H) 70 - 99 mg/dL   Fibrinogen activity    Collection Time: 03/04/23  8:02 PM   Result Value Ref Range    Fibrinogen Activity 296 170 - 490 mg/dL   Comprehensive metabolic panel    Collection Time: 03/04/23  8:02 PM   Result Value Ref Range    Sodium 137 136 - 145 mmol/L    Potassium 3.8 3.4 - 5.3 mmol/L    Chloride 101 98 - 107 mmol/L    Carbon Dioxide (CO2) 24 22 - 29 mmol/L    Anion Gap 12 7 - 15 mmol/L    Urea Nitrogen 61.2 (H) 6.0 - 20.0 mg/dL    Creatinine 2.11 (H) 0.67 - 1.17 mg/dL    Calcium 9.4 8.6 - 10.0 mg/dL    Glucose 146 (H) 70 - 99  mg/dL    Alkaline Phosphatase 125 40 - 129 U/L    AST 75 (H) 10 - 50 U/L     (H) 10 - 50 U/L    Protein Total 5.5 (L) 6.4 - 8.3 g/dL    Albumin 3.5 3.5 - 5.2 g/dL    Bilirubin Total 1.9 (H) <=1.2 mg/dL    GFR Estimate 43 (L) >60 mL/min/1.73m2   INR    Collection Time: 03/04/23  8:02 PM   Result Value Ref Range    INR 1.32 (H) 0.85 - 1.15   Partial thromboplastin time    Collection Time: 03/04/23  8:02 PM   Result Value Ref Range    aPTT 25 22 - 38 Seconds   Phosphorus    Collection Time: 03/04/23  8:02 PM   Result Value Ref Range    Phosphorus 4.8 (H) 2.5 - 4.5 mg/dL   CBC with platelets    Collection Time: 03/04/23  8:02 PM   Result Value Ref Range    WBC Count 13.9 (H) 4.0 - 11.0 10e3/uL    RBC Count 3.22 (L) 4.40 - 5.90 10e6/uL    Hemoglobin 9.9 (L) 13.3 - 17.7 g/dL    Hematocrit 31.1 (L) 40.0 - 53.0 %    MCV 97 78 - 100 fL    MCH 30.7 26.5 - 33.0 pg    MCHC 31.8 31.5 - 36.5 g/dL    RDW 18.7 (H) 10.0 - 15.0 %    Platelet Count 71 (L) 150 - 450 10e3/uL   Blood gas venous    Collection Time: 03/04/23  8:02 PM   Result Value Ref Range    pH Venous 7.30 (L) 7.32 - 7.43    pCO2 Venous 56 (H) 40 - 50 mm Hg    pO2 Venous 41 25 - 47 mm Hg    Bicarbonate Venous 28 21 - 28 mmol/L    Base Excess/Deficit (+/-) 0.9 -7.7 - 1.9 mmol/L    FIO2 2    Glucose by meter    Collection Time: 03/04/23  8:57 PM   Result Value Ref Range    GLUCOSE BY METER POCT 154 (H) 70 - 99 mg/dL   Glucose by meter    Collection Time: 03/04/23 11:09 PM   Result Value Ref Range    GLUCOSE BY METER POCT 122 (H) 70 - 99 mg/dL   Blood gas venous    Collection Time: 03/04/23 11:41 PM   Result Value Ref Range    pH Venous 7.32 7.32 - 7.43    pCO2 Venous 56 (H) 40 - 50 mm Hg    pO2 Venous 36 25 - 47 mm Hg    Bicarbonate Venous 29 (H) 21 - 28 mmol/L    Base Excess/Deficit (+/-) 1.9 -7.7 - 1.9 mmol/L    FIO2 21    Glucose by meter    Collection Time: 03/04/23 11:44 PM   Result Value Ref Range    GLUCOSE BY METER POCT 109 (H) 70 - 99 mg/dL   Glucose by  meter    Collection Time: 03/05/23 12:51 AM   Result Value Ref Range    GLUCOSE BY METER POCT 95 70 - 99 mg/dL   Fibrinogen activity    Collection Time: 03/05/23  3:54 AM   Result Value Ref Range    Fibrinogen Activity 257 170 - 490 mg/dL   Comprehensive metabolic panel    Collection Time: 03/05/23  3:54 AM   Result Value Ref Range    Sodium 134 (L) 136 - 145 mmol/L    Potassium 3.7 3.4 - 5.3 mmol/L    Chloride 99 98 - 107 mmol/L    Carbon Dioxide (CO2) 23 22 - 29 mmol/L    Anion Gap 12 7 - 15 mmol/L    Urea Nitrogen 73.5 (H) 6.0 - 20.0 mg/dL    Creatinine 2.40 (H) 0.67 - 1.17 mg/dL    Calcium 9.2 8.6 - 10.0 mg/dL    Glucose 180 (H) 70 - 99 mg/dL    Alkaline Phosphatase 122 40 - 129 U/L    AST 54 (H) 10 - 50 U/L     (H) 10 - 50 U/L    Protein Total 4.8 (L) 6.4 - 8.3 g/dL    Albumin 3.0 (L) 3.5 - 5.2 g/dL    Bilirubin Total 1.8 (H) <=1.2 mg/dL    GFR Estimate 37 (L) >60 mL/min/1.73m2   INR    Collection Time: 03/05/23  3:54 AM   Result Value Ref Range    INR 1.27 (H) 0.85 - 1.15   Lactic acid whole blood    Collection Time: 03/05/23  3:54 AM   Result Value Ref Range    Lactic Acid 0.6 (L) 0.7 - 2.0 mmol/L   Partial thromboplastin time    Collection Time: 03/05/23  3:54 AM   Result Value Ref Range    aPTT 27 22 - 38 Seconds   Phosphorus    Collection Time: 03/05/23  3:54 AM   Result Value Ref Range    Phosphorus 5.0 (H) 2.5 - 4.5 mg/dL   CBC with platelets    Collection Time: 03/05/23  3:54 AM   Result Value Ref Range    WBC Count 11.0 4.0 - 11.0 10e3/uL    RBC Count 2.83 (L) 4.40 - 5.90 10e6/uL    Hemoglobin 8.7 (L) 13.3 - 17.7 g/dL    Hematocrit 27.4 (L) 40.0 - 53.0 %    MCV 97 78 - 100 fL    MCH 30.7 26.5 - 33.0 pg    MCHC 31.8 31.5 - 36.5 g/dL    RDW 18.9 (H) 10.0 - 15.0 %    Platelet Count 68 (L) 150 - 450 10e3/uL   Blood gas venous    Collection Time: 03/05/23  3:54 AM   Result Value Ref Range    pH Venous 7.31 (L) 7.32 - 7.43    pCO2 Venous 56 (H) 40 - 50 mm Hg    pO2 Venous 42 25 - 47 mm Hg     Bicarbonate Venous 28 21 - 28 mmol/L    Base Excess/Deficit (+/-) 1.3 -7.7 - 1.9 mmol/L    FIO2 30    Ionized Calcium    Collection Time: 03/05/23  3:54 AM   Result Value Ref Range    Calcium Ionized 5.1 4.4 - 5.2 mg/dL   Bilirubin direct    Collection Time: 03/05/23  3:54 AM   Result Value Ref Range    Bilirubin Direct 1.28 (H) 0.00 - 0.30 mg/dL   Glucose by meter    Collection Time: 03/05/23  3:58 AM   Result Value Ref Range    GLUCOSE BY METER POCT 172 (H) 70 - 99 mg/dL   Glucose by meter    Collection Time: 03/05/23  6:08 AM   Result Value Ref Range    GLUCOSE BY METER POCT 162 (H) 70 - 99 mg/dL   Tacrolimus by Tandem Mass Spectrometry    Collection Time: 03/05/23  6:19 AM   Result Value Ref Range    Tacrolimus by Tandem Mass Spectrometry 9.6 5.0 - 15.0 ug/L    Tacrolimus Last Dose Date      Tacrolimus Last Dose Time     Glucose by meter    Collection Time: 03/05/23  6:50 AM   Result Value Ref Range    GLUCOSE BY METER POCT 169 (H) 70 - 99 mg/dL   Blood gas venous    Collection Time: 03/05/23  7:40 AM   Result Value Ref Range    pH Venous 7.32 7.32 - 7.43    pCO2 Venous 54 (H) 40 - 50 mm Hg    pO2 Venous 41 25 - 47 mm Hg    Bicarbonate Venous 28 21 - 28 mmol/L    Base Excess/Deficit (+/-) 1.5 -7.7 - 1.9 mmol/L    FIO2 21    Glucose by meter    Collection Time: 03/05/23  8:00 AM   Result Value Ref Range    GLUCOSE BY METER POCT 142 (H) 70 - 99 mg/dL   Glucose by meter    Collection Time: 03/05/23  9:03 AM   Result Value Ref Range    GLUCOSE BY METER POCT 127 (H) 70 - 99 mg/dL   EKG 12-lead, complete    Collection Time: 03/05/23 10:06 AM   Result Value Ref Range    Systolic Blood Pressure  mmHg    Diastolic Blood Pressure  mmHg    Ventricular Rate 101 BPM    Atrial Rate 101 BPM    VT Interval 210 ms    QRS Duration 90 ms     ms    QTc 407 ms    P Axis 57 degrees    R AXIS 44 degrees    T Axis 16 degrees    Interpretation ECG       Sinus tachycardia with 1st degree A-V block  Possible Left atrial  enlargement  Nonspecific T wave abnormality  Abnormal ECG  When compared with ECG of 27-FEB-2023 10:48,  Nonspecific T wave abnormality now evident in Anterior leads  Confirmed by MD CAROLE, JUAN ([id number]) on 3/6/2023 1:52:26 PM     Glucose by meter    Collection Time: 03/05/23 10:13 AM   Result Value Ref Range    GLUCOSE BY METER POCT 92 70 - 99 mg/dL   C. difficile Toxin B PCR with reflex to C. difficile Antigen and Toxins A/B EIA    Collection Time: 03/05/23 10:23 AM    Specimen: Per Rectum; Stool   Result Value Ref Range    C Difficile Toxin B by PCR Negative Negative   Ammonia    Collection Time: 03/05/23 10:36 AM   Result Value Ref Range    Ammonia 11 (L) 16 - 60 umol/L   Blood gas venous with oxyhemoglobin    Collection Time: 03/05/23 10:36 AM   Result Value Ref Range    pH Venous 7.34 7.32 - 7.43    pCO2 Venous 53 (H) 40 - 50 mm Hg    pO2 Venous 38 25 - 47 mm Hg    Bicarbonate Venous 28 21 - 28 mmol/L    FIO2 21     Oxyhemoglobin Venous 72 70 - 75 %    Base Excess/Deficit (+/-) 1.8 -7.7 - 1.9 mmol/L   Glucose by meter    Collection Time: 03/05/23 11:36 AM   Result Value Ref Range    GLUCOSE BY METER POCT 105 (H) 70 - 99 mg/dL   Glucose by meter    Collection Time: 03/05/23 12:13 PM   Result Value Ref Range    GLUCOSE BY METER POCT 123 (H) 70 - 99 mg/dL   Glucose by meter    Collection Time: 03/05/23  1:20 PM   Result Value Ref Range    GLUCOSE BY METER POCT 141 (H) 70 - 99 mg/dL   Glucose by meter    Collection Time: 03/05/23  3:04 PM   Result Value Ref Range    GLUCOSE BY METER POCT 120 (H) 70 - 99 mg/dL   Glucose by meter    Collection Time: 03/05/23  4:08 PM   Result Value Ref Range    GLUCOSE BY METER POCT 128 (H) 70 - 99 mg/dL   Glucose by meter    Collection Time: 03/05/23  5:06 PM   Result Value Ref Range    GLUCOSE BY METER POCT 114 (H) 70 - 99 mg/dL   Glucose by meter    Collection Time: 03/05/23  6:33 PM   Result Value Ref Range    GLUCOSE BY METER POCT 97 70 - 99 mg/dL   Glucose by meter     Collection Time: 03/05/23  8:28 PM   Result Value Ref Range    GLUCOSE BY METER POCT 131 (H) 70 - 99 mg/dL   Glucose by meter    Collection Time: 03/05/23 10:04 PM   Result Value Ref Range    GLUCOSE BY METER POCT 170 (H) 70 - 99 mg/dL   Glucose by meter    Collection Time: 03/06/23 12:01 AM   Result Value Ref Range    GLUCOSE BY METER POCT 161 (H) 70 - 99 mg/dL   Blood gas venous with oxyhemoglobin    Collection Time: 03/06/23 12:03 AM   Result Value Ref Range    pH Venous 7.34 7.32 - 7.43    pCO2 Venous 55 (H) 40 - 50 mm Hg    pO2 Venous 41 25 - 47 mm Hg    Bicarbonate Venous 29 (H) 21 - 28 mmol/L    FIO2 21     Oxyhemoglobin Venous 73 70 - 75 %    Base Excess/Deficit (+/-) 2.9 (H) -7.7 - 1.9 mmol/L   Glucose by meter    Collection Time: 03/06/23  1:00 AM   Result Value Ref Range    GLUCOSE BY METER POCT 159 (H) 70 - 99 mg/dL   Glucose by meter    Collection Time: 03/06/23  2:00 AM   Result Value Ref Range    GLUCOSE BY METER POCT 119 (H) 70 - 99 mg/dL   CBC with platelets    Collection Time: 03/06/23  3:47 AM   Result Value Ref Range    WBC Count 8.0 4.0 - 11.0 10e3/uL    RBC Count 2.69 (L) 4.40 - 5.90 10e6/uL    Hemoglobin 8.4 (L) 13.3 - 17.7 g/dL    Hematocrit 26.5 (L) 40.0 - 53.0 %    MCV 99 78 - 100 fL    MCH 31.2 26.5 - 33.0 pg    MCHC 31.7 31.5 - 36.5 g/dL    RDW 19.7 (H) 10.0 - 15.0 %    Platelet Count 90 (L) 150 - 450 10e3/uL   Comprehensive metabolic panel    Collection Time: 03/06/23  3:47 AM   Result Value Ref Range    Sodium 136 136 - 145 mmol/L    Potassium 3.2 (L) 3.4 - 5.3 mmol/L    Chloride 100 98 - 107 mmol/L    Carbon Dioxide (CO2) 24 22 - 29 mmol/L    Anion Gap 12 7 - 15 mmol/L    Urea Nitrogen 96.0 (H) 6.0 - 20.0 mg/dL    Creatinine 2.40 (H) 0.67 - 1.17 mg/dL    Calcium 9.0 8.6 - 10.0 mg/dL    Glucose 119 (H) 70 - 99 mg/dL    Alkaline Phosphatase 130 (H) 40 - 129 U/L    AST 46 10 - 50 U/L     (H) 10 - 50 U/L    Protein Total 4.5 (L) 6.4 - 8.3 g/dL    Albumin 2.8 (L) 3.5 - 5.2 g/dL     Bilirubin Total 1.5 (H) <=1.2 mg/dL    GFR Estimate 37 (L) >60 mL/min/1.73m2   Fibrinogen activity    Collection Time: 03/06/23  3:47 AM   Result Value Ref Range    Fibrinogen Activity 282 170 - 490 mg/dL   INR    Collection Time: 03/06/23  3:47 AM   Result Value Ref Range    INR 1.25 (H) 0.85 - 1.15   Lactic acid whole blood    Collection Time: 03/06/23  3:47 AM   Result Value Ref Range    Lactic Acid 0.7 0.7 - 2.0 mmol/L   Partial thromboplastin time    Collection Time: 03/06/23  3:47 AM   Result Value Ref Range    aPTT 24 22 - 38 Seconds   Phosphorus    Collection Time: 03/06/23  3:47 AM   Result Value Ref Range    Phosphorus 4.5 2.5 - 4.5 mg/dL   Ionized Calcium    Collection Time: 03/06/23  3:47 AM   Result Value Ref Range    Calcium Ionized 5.2 4.4 - 5.2 mg/dL   Bilirubin direct    Collection Time: 03/06/23  3:47 AM   Result Value Ref Range    Bilirubin Direct 1.02 (H) 0.00 - 0.30 mg/dL   Glucose by meter    Collection Time: 03/06/23  3:49 AM   Result Value Ref Range    GLUCOSE BY METER POCT 112 (H) 70 - 99 mg/dL   Tacrolimus by Tandem Mass Spectrometry    Collection Time: 03/06/23  5:53 AM   Result Value Ref Range    Tacrolimus by Tandem Mass Spectrometry 6.5 5.0 - 15.0 ug/L    Tacrolimus Last Dose Date      Tacrolimus Last Dose Time     Glucose by meter    Collection Time: 03/06/23  5:55 AM   Result Value Ref Range    GLUCOSE BY METER POCT 111 (H) 70 - 99 mg/dL   Glucose by meter    Collection Time: 03/06/23  6:54 AM   Result Value Ref Range    GLUCOSE BY METER POCT 106 (H) 70 - 99 mg/dL   Glucose by meter    Collection Time: 03/06/23  8:17 AM   Result Value Ref Range    GLUCOSE BY METER POCT 115 (H) 70 - 99 mg/dL   Glucose by meter    Collection Time: 03/06/23  9:02 AM   Result Value Ref Range    GLUCOSE BY METER POCT 121 (H) 70 - 99 mg/dL   Glucose by meter    Collection Time: 03/06/23 10:03 AM   Result Value Ref Range    GLUCOSE BY METER POCT 121 (H) 70 - 99 mg/dL   Glucose by meter    Collection Time:  "03/06/23 10:59 AM   Result Value Ref Range    GLUCOSE BY METER POCT 144 (H) 70 - 99 mg/dL   Glucose by meter    Collection Time: 03/06/23 12:10 PM   Result Value Ref Range    GLUCOSE BY METER POCT 142 (H) 70 - 99 mg/dL   Glucose by meter    Collection Time: 03/06/23 12:58 PM   Result Value Ref Range    GLUCOSE BY METER POCT 144 (H) 70 - 99 mg/dL          Physical and Psychiatric Examination:     BP (!) 141/77   Pulse 96   Temp 97.8  F (36.6  C) (Axillary)   Resp 18   Wt 78.5 kg (173 lb 1 oz)   SpO2 98%   BMI 28.80 kg/m    Weight is 173 lbs .98 oz  Body mass index is 28.8 kg/m .    Physical Exam:  I have reviewed the physical exam as documented by by the medical team and agree with findings and assessment and have no additional findings to add at this time.         MSE:   Appearance: awake, alert and adequately groomed  Attitude:  slightly uncooperative  Eye Contact:  fair  Mood:  \"OK\"  Affect:  : slightly restricted  Speech:  clear, coherent  Psychomotor Behavior:  no evidence of tardive dyskinesia, dystonia, or tics  Muscle strength and tone: intact   Thought Process:  logical and linear  Associations:  no loose associations  Thought Content:  visual hallucinations present  Insight:  fair  Judgement:  fair  Oriented to:  place, month and day of week   Attention Span and Concentration:  fair  Recent and Remote Memory:  fair             DSM-5 Diagnosis:   Autism spectrum disorder   Depression, in partial remission.  Delirium   Alcohol use disorder           Assessment:   He seems to have relatively good control of his depression on Prozac; no need for an augmentation.   His symptoms are consistent with a mild delirium; he is minimizing symptoms.           Summary of Recommendations:   I would use scheduled Zyprexa Zydis 5 mg HS and 2.5 mg BID PRN anxiety or hallucinations. I would discontinue Zyprexa a few days after resolution of delirium symptoms.   Continue current dose of Prozac     Page me or re-consult " "psychiatry as needed (psychiatry is signing off).     Gary Huertas M.D.   Consult liaison psychiatry   [facility]   Contact information available via [facility] Paging/Directory.  If I am not available, then [facility] intake ([phone]) should know who   Is on call        \"This dictation was performed with voice recognition software and may contain errors,  omissions and inadvertent word substitution.\"           "

## 2023-03-06 NOTE — PROGRESS NOTES
"   03/06/23 5177   Appointment Info   Signing Clinician's Name / Credentials (SLP) Dhiraj Sagastume MA [facility] SLP   General Information   Onset of Illness/Injury or Date of Surgery 02/28/23   Referring Physician Delia Johnson APRN CNP   Pertinent History of Current Problem Per provider notes: \"Dillon Salter is a 27 year old male with Asperger's, T2DM, alcohol related cirrhosis c/b hepatic encephalopathy, history of esophageal varices bleeding (5/2022, 1/2023) and rectal varices s/p sclerotherapy 1/2023, ascites presenting with hematemesis. Underwent EGD 2/11 showed oozing portal hypertensive gastropathy. He is now s/p DDLT 2/28/23 with 20L EBL. He was admitted to the SICU for post-op hemodynamic and respiratory monitoring. Requiring short term pressors, low urinary output intraoperatively with concern for possible ongoing oozing, he returned to the OR on 03/01 for washout and repair of bleeder.\"   General Observations Patient upright in bed, reports feeling weak.   Type of Evaluation   Type of Evaluation Swallow Evaluation   Oral Motor   Oral Musculature generally intact   Mucosal Quality dry   Dentition (Oral Motor)   Dentition (Oral Motor) natural dentition   Facial Symmetry (Oral Motor)   Facial Symmetry (Oral Motor) WNL   Lip Function (Oral Motor)   Lip Range of Motion (Oral Motor) WNL   Tongue Function (Oral Motor)   Tongue Coordination/Speed (Oral Motor) reduced rate   Tongue ROM (Oral Motor) WNL   Jaw Function (Oral Motor)   Jaw Function (Oral Motor) WNL   Cough/Swallow/Gag Reflex (Oral Motor)   Volitional Throat Clear/Cough (Oral Motor) reduced strength;other (see comments)  (did not want to attempt volitional cough)   Volitional Swallow (Oral Motor) mildly delayed   Vocal Quality/Secretion Management (Oral Motor)   Vocal Quality (Oral Motor) WNL   Secretion Management (Oral Motor) WNL   General Swallowing Observations   Past History of Dysphagia None prior to most recent hospitalization   Current " Diet/Method of Nutritional Intake (General Swallowing Observations, NIS) NPO   Swallowing Evaluation Clinical swallow evaluation   Clinical Swallow Evaluation   Feeding Assistance dependent   Clinical Swallow Evaluation Textures Trialed thin liquids;pureed   Clinical Swallow Eval: Thin Liquid Texture Trial   Mode of Presentation, Thin Liquids straw;fed by clinician   Volume of Liquid or Food Presented >4 oz thin water   Oral Phase of Swallow WFL   Pharyngeal Phase of Swallow intact   Diagnostic Statement No overt s/s of aspiration observed.   Clinical Swallow Evaluation: Puree Solid Texture Trial   Mode of Presentation, Puree spoon;fed by clinician   Volume of Puree Presented 3 tsps   Oral Phase, Puree WFL;other (see comments)  (extended bolus manipulation, but adequate clearance.)   Pharyngeal Phase, Puree intact   Diagnostic Statement No overt s/s of aspiration observed. Good oral clearance.   Swallowing Recommendations   Diet Consistency Recommendations full liquid diet;thin liquids (level 0)   Supervision Level for Intake 1:1 supervision needed   Mode of Delivery Recommendations bolus size, small;slow rate of intake   Monitoring/Assistance Required (Eating/Swallowing) stop eating activities when fatigue is present;monitor for cough or change in vocal quality with intake   Recommended Feeding/Eating Techniques (Swallow Eval) maintain upright sitting position for eating;maintain upright posture during/after eating for 30 minutes;minimize distractions during oral intake;provide assist with feeding   Medication Administration Recommendations, Swallowing (SLP) As tolerated   Instrumental Assessment Recommendations   (Pending progress)   General Therapy Interventions   Planned Therapy Interventions Dysphagia Treatment   Dysphagia treatment Modified diet education;Instruction of safe swallow strategies   Clinical Impression   Criteria for Skilled Therapeutic Interventions Met (SLP Eval) Yes, treatment indicated   SLP  Diagnosis Mild oral dysphagia   Risks & Benefits of therapy have been explained evaluation/treatment results reviewed;care plan/treatment goals reviewed;risks/benefits reviewed;current/potential barriers reviewed;participants voiced agreement with care plan;participants included;patient   Clinical Impression Comments   Clinical swallow evaluation completed per provider orders. Patient presents with mild oral dysphagia. Suspect generalized weakness impacting overall swallow mechanism. Patient oral motor revealed reduced volitional throat clearing strength, mildly delayed initiation of volitional swallow, and reduced lingual coordination speed. Refused to attempt volitional cough. Trials of thin liquids via straw sips and limited puree textures completed - patient refused to attempt any advanced solid textures. No overt s/s of aspiration with any PO intake. Patient oral phase c/b adequate bolus acceptance and closure, mildly extended bolus manipulation and holding with puree textures, but cleared independently, and good oral clearance. Patient reports some general discomfort with swallowing, but no severe pain.     Recommend upgrade to full liquids - thin liquids diet with 1:1 supervision/assistance. Patient should be fully upright and alert, remain upright at least 30 minutes after intake, take small bites/sips, and use a slow rate of intake. Discontinue PO intake with inability to maintain alertness or with noted s/s of aspiration.     SLP Total Evaluation Time   Eval: oral/pharyngeal swallow function, clinical swallow Minutes ([id number]) 16   SLP Discharge Planning   SLP Discharge Recommendation Transitional Care Facility   SLP Rationale for DC Rec Below baseline swallow fx

## 2023-03-06 NOTE — PROGRESS NOTES
Transplant Surgery  Inpatient Daily Progress Note  2023    Assessment & Plan: Dillon Salter is a 27 year old male with a past medical history of Asperger's, DM2, and alcoholic cirrhosis c/b esophageal varices and hepatic encephalopathy. He is now s/p  donor liver transplant without stent on 23 with Dr. Sterling. Return to OR for washout and repair of arterial bleeding on 3/1/23.    s/p DDLT 23: POD #6. LFTs trending down. Tbili 1.5 (1.8), LA 0.7.   -continue ASA 325mg daily    Imaging:  -3/1 US: resolution of hematoma, patent vessels  -3/3 US: Patent, resolution of perihepatic hematoma with new perihepatic hematoma up to 9 cm.  Ammonia 11 (Checked due to AMS)    Immunosuppression management:  Induction: Steroid taper and basiliximab x2 per renal sparing protocol.  Maintenance:   - mg BID, reduce to 500mg BID in setting of diarrhea.  -Tacrolimus 1mg BID,  goal level 5-8 due to renal sparing.  -Prednisone 5mg daily due to tac<8.    Neuro/Psych:  Acute post op pain; Chronic musculoskeletal pain: On oxycodone PTA. Continue Oxycodone 2.5mg Q4 along with Tylenol 650mg Q8-change to PRN, minimize narcotic use due to sedation  MDD, Anxiety, Autism spectrum: Mother is caretaker, lives with parents.  PTA fluoxetine as able.   Acute delirium: possible ICU delirium with hallucinations. Continue to minimize narcotics as able. Continue melatonin/seroquel at bedtime.  Hematology:   Anemia of chronic disease/Acute blood loss: Hgb now stable ~8-9   Thrombocytopenia:improving,  PLT 90  Coagulopathy: INR 1.25  Will not plan for subcutaneous heparin for DVT prophylaxis since patient already coagulopathic    Cardiorespiratory:   Post op ventilatory support/hypercapnia/acute pulmonary edema: Extubated 3/3. Lethargic and hypercapnic 3/4 AM, improved with BiPAP. Improved, Pox >94% on RA.  -CXR 3/5: stable cardiomegaly, improved pulmonary edema   Hypovolemic/hemorrhagic shock: Secondary to bleeding.  Resolved.   Tachycardia:     GI/Nutrition:   Severe malnutrition in the context of acute illness: Nutrition consulted. FT placed, receiving Novasource Renal, goal 40ml/hr. Will obtain bedside swallow HAWA guzmán.   Diarrhea: Cdiff neg, On Fiber -increase to TID. Hold stool softeners.    Endocrine:   DM2; Steroid induced hyperglycemia: on insulin gtt, suggest transition off     Fluid/Electrolytes/Neph:   ASHISH: Present prior to transplant (prerenal-hypovolemia 2/2 blood loss) now exacerbated by liver transplant, shock. CRRT started 3/1/23. Tolerated iHD 3/4, will plan next for MWF. Will need tunneled line if needs dialysis OP.  Hyperkalemia: Resolved with CRRT. Now hypokalemic. Electrolytes per ICU    : Negron removed. Check periodic bladder scans.    Infectious disease: No issues.     Prophylaxis: DVT (mechanical), fall, GI (PPI), fungal (Initially received fluconazole, change to Micafungin d/t CRRT/re-operation), viral (Valcyte), pneumocystis (Bactrim -restart today now that hyperkalemia resolved), jalen-op (Zosyn)    Disposition: SICU, transfer to     CHRISTA/Fellow/Resident Provider: Julieth Leblanc NP  25 minutes spent on chart review, exam, and discussion with Nephrology/SICU.    Faculty: Thelma Sterling M.D.    __________________________________________________________________  Transplant History:    2/28/2023 (Liver), Postoperative day: 6     Interval History:  Overnight events: Lethargy improved, still slightly confused with some visual and auditory hallucinations per bedside nurse. Pt. C/o dry mouth/thirst.  Diarrhea persists. Pain controlled.     ROS:   A 10-point review of systems was negative except as noted above.    Curent Meds:    acetaminophen  650 mg Oral Q8H     aspirin  325 mg Per Feeding Tube Daily     FLUoxetine  60 mg Oral At Bedtime     melatonin  3 mg Oral QPM     [Held by provider] methocarbamol  500 mg Oral 4x Daily     micafungin  100 mg Intravenous Q24H     multivitamin RENAL  1  tablet Oral Daily     mycophenolate  750 mg Oral BID IS    Or     mycophenolate  750 mg Oral or NG Tube BID IS     pantoprazole  40 mg Per Feeding Tube BID     polyethylene glycol  17 g Oral Daily     predniSONE  5 mg Oral Daily     protein modular  1 packet Per Feeding Tube BID     sodium chloride (PF)  3 mL Intravenous Q8H     tacrolimus  1 mg Oral BID IS    Or     tacrolimus  1 mg Oral or NG Tube BID IS     valGANciclovir  450 mg Oral Once per day on Tue Fri    Or     valGANciclovir  450 mg Oral or NG Tube Once per day on Tue Fri       Physical Exam:     Admit Weight: 65.9 kg (145 lb 4.5 oz)    Current Vitals:   /76   Pulse 101   Temp 96.9  F (36.1  C) (Axillary)   Resp 18   Wt 78.5 kg (173 lb 1 oz)   SpO2 97%   BMI 28.80 kg/m      Vital sign ranges:    Temp:  [96.9  F (36.1  C)-98.2  F (36.8  C)] 96.9  F (36.1  C)  Pulse:  [] 101  Resp:  [16-18] 18  BP: (113-153)/(57-87) 136/76  SpO2:  [91 %-99 %] 97 %    General Appearance: No acute distress  Skin: warm, dry  Heart: Perfused  Lungs: Nonlabored resps on RA  Abdomen: abdomen soft, non-distended. Incision c/d/i. L JUANA site with ostomy appliance serous  : incontinent  Extremities: edema: generalized +1   Neurologic: Tremor absent. Awake, alert, makes needs known. Autumn independently.  some hallucinations this AM per nurse. Confused by situation.    Frailty Scores     Frailty Scores 12/20/2022    Final Score Frail    Final Score Number 5          Data:   CMP  Recent Labs   Lab 03/06/23  0555 03/06/23  0349 03/06/23  0347 03/05/23  0358 03/05/23  0354 03/03/23  0359 03/03/23  0354 03/03/23  0219 03/03/23  0130 03/01/23  0740 03/01/23  0609 02/27/23  1406 02/27/23  1057   NA  --   --  136  --  134*   < > 137  --  141   < > 142   < >  --    POTASSIUM  --   --  3.2*  --  3.7   < > 4.7  --  4.6   < > 5.7*   < >  --    CHLORIDE  --   --  100  --  99   < > 105  --  108*   < > 103   < >  --    CO2  --   --  24  --  23   < > 24  --  24   < > 25   < >  --     * 112* 119*   < > 180*   < > 144*   < > 142*   < > 114*   < >  --    BUN  --   --  96.0*  --  73.5*   < > 36.5*  --  34.8*   < > 60.2*   < >  --    CR  --   --  2.40*  --  2.40*   < > 1.33*  --  1.26*   < > 1.67*   < >  --    GFRESTIMATED  --   --  37*  --  37*   < > 74  --  79   < > 56*   < >  --    SARTHAK  --   --  9.0  --  9.2   < > 8.6  --  8.4*   < > 9.4   < >  --    ICAW  --   --  5.2  --  5.1   < > 4.8  --   --    < > 4.8   < >  --    MAG  --   --   --   --   --   --  2.4*  --  2.2   < > 2.1   < > 2.7*   PHOS  --   --  4.5  --  5.0*   < > 5.2*  --  5.0*   < > 6.7*   < > 3.1   AMYLASE  --   --   --   --   --   --   --   --   --   --  62  --  12*   LIPASE  --   --   --   --   --   --   --   --   --   --  19  --   --    ALBUMIN  --   --  2.8*  --  3.0*   < > 3.3*  --   --    < > 2.8*   < >  --    BILITOTAL  --   --  1.5*  --  1.8*   < > 2.6*  --   --    < > 4.9*   < >  --    ALKPHOS  --   --  130*  --  122   < > 84  --   --    < > 43   < >  --    AST  --   --  46  --  54*   < > 262*  --   --    < > 347*   < >  --    ALT  --   --  181*  --  251*   < > 546*  --   --    < > 268*   < >  --     < > = values in this interval not displayed.     CBC  Recent Labs   Lab 03/06/23  0347 03/05/23  0354 02/28/23  1850 02/28/23  1703   HGB 8.4* 8.7*   < > 9.8*   WBC 8.0 11.0   < > 6.2   PLT 90* 68*   < > 94*   A1C  --   --   --  6.1*    < > = values in this interval not displayed.

## 2023-03-06 NOTE — PROGRESS NOTES
[facility]   Transplant Nephrology Progress Note  Date of Admission:  2/11/2023  Today's Date: 03/06/2023    Recommendations:  - Will plan iHD today with some volume removal.   - As much as possible, would record urine output.  - Would consider tunneled dialysis catheter later this week if still requiring dialysis.  - Okay to start Bactrim for PJP prophylaxis with low serum potassium now.  - Recommend starting cholecalciferol 25 mcg daily.    Assessment & Plan   # ASHISH: Stable creatinine, but increase in BUN.  Unclear urine output with some unmeasured voids as part of frequent watery stools.  ASHISH likely secondary to hemorrhagic shock and hemodynamic changes following liver transplant, as well as mild HRS prior to transplant.  - HD Info  Access: Temporary Catheter right IJ, Days: MWF, Length: 4.0 hrs, EDW: 66.0 kg, Heparin: No, MARISEL: No, IV Iron: No, Vit D analog: No   - Baseline Creatinine: ~ 0.6-0.8   - Proteinuria: Normal (<0.2 grams)    # Liver Tx: ESLD secondary to alcoholic cirrhosis, s/p OLT 2/28/23.  Trend down in transaminases and total bilirubin.  Followed by Transplant Surgery.    # Immunosuppression: Tacrolimus immediate release (goal 5-8), Mycophenolate mofetil (dose 750 mg every 12 hours) and Prednisone (dose 5 mg daily)   - Induction with Recent Transplant:  Per Liver Tx protocol.   - Changes: Not at this time    # Infection Prophylaxis:   - PJP: None at this time; Okay to start per Transplant Nephrology.  - CMV: Valganciclovir (Valcyte)  - Thrush: Micafungin (Mycamine)  - Fungal: Micafungin (Mycamine)    # Hypertension: Controlled;  Goal BP: < 140/90 (Hospitalization goal)   - Volume status: Mildly hypervolemic  EDW ~ 66.0 kg   - Changes: Not at this time, but will pull some volume with dialysis.    # Diabetes: Controlled (HbA1c <7%) Last HbA1c: 6.1%   - Management as per primary team.    # Anemia in Chronic Disease: Hgb: Trend down      MARISEL: No   - Iron  studies: Low iron saturation, but high ferritin    # Thrombocytopenia: Trend up in platelet level.    # Mineral Bone Disorder:   - Vitamin D; level: Low        On supplement: No; Recommend starting cholecalciferol 25 mcg daily.  - Calcium; level: Normal        On supplement: No  - Phosphorus; level: Normal        On binder: No    # Electrolytes:   - Potassium; level: Low        On supplement: No; Modulate with HD  - Magnesium; level: High        On supplement: No  - Bicarbonate; level: Normal        On supplement: No    # Malnutrition: Patient on tube feedings.    # Diarrhea: Frequent watery stools.  C. diff negative 3/5.   - Recommend increased dietary fiber.    # Transplant History:  Etiology of Organ Failure: Alcoholic cirrhosis  Tx: Liver Tx  Transplant: 2/28/2023 (Liver)  Donor Type:  Donor Class:   Crossmatch at time of Tx: negative  DSA at time of Tx: No   Significant changes in immunosuppression: Lower CNI goal due to ASHISH  Significant transplant-related complications: ASHISH    Recommendations were communicated to the primary team via this note.    Antonio Aguila MD   Pager: [phone]    Interval History   Mr. Rogers creatinine is 2.40 (03/06 0347); Stable, but increased BUN.  Unclear urine output as several probable unmeasured as part of frequent watery stools.  Trend down in transaminases and total bilirubin.  Other significant labs/tests/vitals: Decreased serum potassium.  Otherwise, stable electrolytes.  Trend down in Hgb.  No new events overnight.  Alert and mostly oriented, but reported hallucinations.  No chest pain, but some shortness of breath.  Stable leg swelling.  No nausea and vomiting.  Bowel movements are watery and frequent.  No fever, sweats or chills.    Review of Systems   4 point ROS was obtained and negative except as noted in the Interval History.    MEDICATIONS:    sodium chloride 0.9%  300 mL Hemodialysis Machine Once     acetaminophen  650 mg Oral Q8H     albumin human  250 mL  Intravenous Once in dialysis/CRRT     aspirin  325 mg Per Feeding Tube Daily     fiber modular  1 packet Per Feeding Tube TID     FLUoxetine  60 mg Oral At Bedtime     sodium chloride (PF) 0.9%  10 mL Intracatheter Once in dialysis/CRRT    Followed by     heparin  1.3-2.6 mL Intracatheter Once in dialysis/CRRT     sodium chloride (PF) 0.9%  10 mL Intracatheter Once in dialysis/CRRT    Followed by     heparin  1.3-2.6 mL Intracatheter Once in dialysis/CRRT     melatonin  3 mg Oral QPM     [Held by provider] methocarbamol  500 mg Oral 4x Daily     micafungin  100 mg Intravenous Q24H     multivitamin RENAL  1 tablet Oral Daily     mycophenolate  750 mg Oral BID IS    Or     mycophenolate  750 mg Oral or NG Tube BID IS     - MEDICATION INSTRUCTIONS -   Does not apply Once     pantoprazole  40 mg Per Feeding Tube BID     [Held by provider] polyethylene glycol  17 g Oral Daily     predniSONE  5 mg Oral Daily     protein modular  1 packet Per Feeding Tube BID     sodium chloride (PF)  3 mL Intravenous Q8H     tacrolimus  1 mg Oral BID IS    Or     tacrolimus  1 mg Oral or NG Tube BID IS     valGANciclovir  450 mg Oral Once per day on     Or     valGANciclovir  450 mg Oral or NG Tube Once per day on        dextrose       dextrose Stopped (23 1400)     insulin regular 1 Units/hr (23 0907)       Physical Exam   Temp  Av.6  F (36.4  C)  Min: 92.8  F (33.8  C)  Max: 99.5  F (37.5  C)  Arterial Line BP  Min: 78/56  Max: 173/78  Arterial Line MAP (mmHg)  Av.4 mmHg  Min: 56 mmHg  Max: 190 mmHg      Pulse  Av.4  Min: 62  Max: 112 Resp  Avg: 15.7  Min: 8  Max: 34  FiO2 (%)  Av.5 %  Min: 30 %  Max: 100 %  SpO2  Av.6 %  Min: 84 %  Max: 100 %    CVP (mmHg): 8 mmHgBP (!) 150/85   Pulse 110   Temp 97.6  F (36.4  C) (Axillary)   Resp 18   Wt 78.5 kg (173 lb 1 oz)   SpO2 95%   BMI 28.80 kg/m     Date 23 07 - 23 0659   Shift [id number] 4888-5060 1981-6146 24 Hour Total    INTAKE   I.V. 156   156   NG/GT 80   80   Enteral 160   160   Shift Total(mL/kg) 396(5.04)   396(5.04)   OUTPUT   Shift Total(mL/kg)       Weight (kg) 78.5 78.5 78.5 78.5      Admit Weight: 65.9 kg (145 lb 4.5 oz)     GENERAL APPEARANCE: alert and no distress  HENT: mouth without ulcers or lesions, NG in place  RESP: lungs clear to auscultation - no rales, rhonchi or wheezes  CV: regular rhythm, normal rate, no rub, no murmur  EDEMA: none to trace LE and 1+ dependent edema bilaterally  ABDOMEN: soft, nondistended, mild TTP, bowel sounds normal  MS: extremities normal - no gross deformities noted, no evidence of inflammation in joints, no muscle tenderness  SKIN: no rash  DIALYSIS ACCESS:  Temporary catheter right internal jugular    Data   All labs reviewed by me.  CMP  Recent Labs   Lab 03/06/23  0902 03/06/23  0817 03/06/23  0654 03/06/23  0555 03/06/23  0349 03/06/23  0347 03/05/23  0358 03/05/23  0354 03/04/23  2057 03/04/23 2002 03/04/23  1159 03/04/23  1155 03/03/23  0359 03/03/23  0354 03/03/23  0219 03/03/23  0130 03/02/23  1951 03/02/23  1945 03/02/23  1202 03/02/23  1157   NA  --   --   --   --   --  136  --  134*  --  137  --  134*   < > 137  --  141  --  144  --  139   POTASSIUM  --   --   --   --   --  3.2*  --  3.7  --  3.8  --  3.7   < > 4.7  --  4.6  --  3.4  --  4.9   CHLORIDE  --   --   --   --   --  100  --  99  --  101  --  100   < > 105  --  108*  --  117*  --  102   CO2  --   --   --   --   --  24  --  23  --  24  --  24   < > 24  --  24  --  18*  --  24   ANIONGAP  --   --   --   --   --  12  --  12  --  12  --  10   < > 8  --  9  --  9  --  13   * 115* 106* 111*   < > 119*   < > 180*   < > 146*   < > 175*   < > 144*   < > 142*   < > 101*   < > 135*   BUN  --   --   --   --   --  96.0*  --  73.5*  --  61.2*  --  53.5*   < > 36.5*  --  34.8*  --  26.4*  --  37.6*   CR  --   --   --   --   --  2.40*  --  2.40*  --  2.11*  --  1.87*   < > 1.33*  --  1.26*  --  0.98  --  1.39*    GFRESTIMATED  --   --   --   --   --  37*  --  37*  --  43*  --  49*   < > 74  --  79  --  >90  --  70   SARTHAK  --   --   --   --   --  9.0  --  9.2  --  9.4  --  8.7   < > 8.6  --  8.4*  --  6.3*  --  9.5   MAG  --   --   --   --   --   --   --   --   --   --   --   --   --  2.4*  --  2.2  --  1.6*  --  2.1   PHOS  --   --   --   --   --  4.5  --  5.0*  --  4.8*  --  4.2   < > 5.2*  --  5.0*  --  3.9  --  5.7*   PROTTOTAL  --   --   --   --   --  4.5*  --  4.8*  --  5.5*  --  4.8*   < > 5.0*  --   --   --  3.7*  --  5.0*   ALBUMIN  --   --   --   --   --  2.8*  --  3.0*  --  3.5  --  3.1*   < > 3.3*  --   --   --  2.4*  --  3.3*   BILITOTAL  --   --   --   --   --  1.5*  --  1.8*  --  1.9*  --  1.7*   < > 2.6*  --   --   --  2.5*  --  4.3*   ALKPHOS  --   --   --   --   --  130*  --  122  --  125  --  108   < > 84  --   --   --  51  --  60   AST  --   --   --   --   --  46  --  54*  --  75*  --  82*   < > 262*  --   --   --  264*  --  497*   ALT  --   --   --   --   --  181*  --  251*  --  322*  --  333*   < > 546*  --   --   --  422*  --  636*    < > = values in this interval not displayed.     CBC  Recent Labs   Lab 03/06/23  0347 03/05/23  0354 03/04/23 2002 03/04/23  1155   HGB 8.4* 8.7* 9.9* 9.0*   WBC 8.0 11.0 13.9* 12.7*   RBC 2.69* 2.83* 3.22* 2.94*   HCT 26.5* 27.4* 31.1* 28.4*   MCV 99 97 97 97   MCH 31.2 30.7 30.7 30.6   MCHC 31.7 31.8 31.8 31.7   RDW 19.7* 18.9* 18.7* 18.6*   PLT 90* 68* 71* 57*     INR  Recent Labs   Lab 03/06/23  0347 03/05/23  0354 03/04/23 2002 03/04/23  1155   INR 1.25* 1.27* 1.32* 1.45*   PTT 24 27 25 25     ABG  Recent Labs   Lab 03/06/23  0003 03/05/23  1036 03/05/23  0740 03/05/23  0354 03/04/23  1155 03/04/23  0944 03/04/23  0744 03/04/23  0339 03/01/23  1423 03/01/23  1317   PH  --   --   --   --   --  7.32* 7.30*  --  7.41 7.38   PCO2  --   --   --   --   --  56* 61*  --  37 42   PO2  --   --   --   --   --  83 61*  --  112* 225*   HCO3  --   --   --   --   --  29* 29*  --   23 25   O2PER 21 21 21 30   < > 30 2   < > 40.0 60.0    < > = values in this interval not displayed.      Urine Studies  Recent Labs   Lab Test 02/27/23  1616 02/24/23  1818 02/22/23  1421 02/19/23  2051   COLOR Yellow Light Yellow Yellow Yellow   APPEARANCE Clear Clear Clear Clear   URINEGLC Negative Negative Negative Negative   URINEBILI Negative Negative Negative Negative   URINEKETONE Negative Negative Negative Negative   SG 1.012 1.009 1.010 1.015   UBLD Negative Negative Negative Negative   URINEPH 5.5 5.0 5.0 5.0   PROTEIN 30* Negative Negative 30*   NITRITE Negative Negative Negative Negative   LEUKEST Negative Negative Negative Negative   RBCU <1 <1 1 26*   WBCU 2 1 2 16*     No lab results found.  PTH  No lab results found.  Iron Studies  Recent Labs   Lab Test 02/22/23  0610 02/20/23  0730 02/16/23  0543 12/20/22  0703 10/06/22  0958 04/07/22  0624   IRON 26*  --   --  249*  --  85   *  --   --   --   --   --    IRONSAT 19  --   --   --   --   --    ASCENCION 1,465* 1,335* 1,725* 2,207* 2,042* 423*       IMAGING:  All imaging studies reviewed by me.

## 2023-03-06 NOTE — PROGRESS NOTES
SURGICAL ICU PROGRESS NOTE  03/06/2023      PRIMARY TEAM: Transplant  PRIMARY PHYSICIAN: Dr. Sterling    REASON FOR CRITICAL CARE ADMISSION:  Hemodynamic monitoring and pressor requirements  ADMITTING PHYSICIAN: Dr. Parekh      ASSESSMENT: Dillon Salter is a 27 year old male with Asperger's, T2DM, alcohol related cirrhosis c/b hepatic encephalopathy, history of esophageal varices bleeding (5/2022, 1/2023) and rectal varices s/p sclerotherapy 1/2023, ascites presenting with hematemesis. Underwent EGD 2/11 showed oozing portal hypertensive gastropathy. He is now s/p DDLT 2/28/23 with 20L EBL. He was admitted to the SICU for post-op hemodynamic and respiratory monitoring. Requiring short term pressors, low urinary output intraoperatively with concern for possible ongoing oozing, he returned to the OR on 03/01 for washout and repair of bleeder. CRRT switched to iHD on 3.3.     Overnight:   Pt continues to have delirium/paranoia. Slept for a few hours. Hemodynamically stable.    Changes Today:  - Start fiber, 6+ bowel movements in last 24 H, c-diff negative 3/5.  - Bedside swallow with nursing, encourage PO intake.  - Transfer to floor with transplant team primary after MAC introducer rewired to TLC.    Neuro/ pain/ sedation:  # Acute on chronic pain  # Hx Depression  # Hx Autism spectrum disorder  - Monitor neurological status. Notify the MD for any acute changes in exam.  - Scheduled tylenol 650 mg Q8H, PRN oxy 2.5 Q4H  - Continue PTA Prozac 60 mg daily  - HOLD PTA oxy 5 mg Q12H PRN, sanchez 100 mg daily PRN    # Delirium   - Psych consulted  - Start melatonin 3 mg QHS for sleep  - Optimize environment - lights on during day, off at night, normalize routine    Pulmonary care:   # Hypoxia 2/2 to fluid overload, resolved  - Extubated on BiPAP 3/3, now on room air  - Supplemental oxygen to keep saturation above 92 %.  - Incentive spirometer every 15- 30 minutes when awake.  - VBGs consistently stable with pH 7.34 and  pCO2 55 today. Stop trending     Cardiovascular:    # Shock, hypovolemic, resolved  - Monitor hemodynamic status. MAP goal >65   - Off of pressors 3/1    GI care:   # S/P DDLT 2/28/23  # ESLD 2/2 alcoholic cirrhosis  # Hx of esophageal varices   # Hx of Portal hypertensive gastropathy  # Hx of Esophageal ulceration  # Hx of Recurrent rectal varices  # Hx of hepatic encephalopathy   - Transplant service primary, managing  - Liver US: 7.2 x 1.8 x 7.3 right perihepatic hematoma  - Monitor LFTs, all downtrending  - Protonix BID  - Hold PTA meds (bumex 1 mg dailyspironolactone 100 mg daily, coreg 12.5 BID)     Renal/ Fluids/ Electrolytes/ Nutrition:   # ASHISH 2/2 to shock, ATN with Hx Hepatorenal syndrome prior to transplantation  # Hypokalemia  - Baseline Cr <1 (Cr: 2.4) CTM  - iHD per nephrology, has been MWF schedule  - Electrolyte management per nephrology service, plan for dialysis today  - Current goal of I = O  - Will continue to monitor intake and output    # Severe malnutrition in the context of acute on chronic disease  # Hypoalbuminemia  # Overweight  - Nutrition consulted, appreciate recommendations  - NJ placed on 03/02, at goal of 40ml/hr.   - Bowel meds on HOLD given increased stool output  - Rectal pouch for stool collection, may need rectal tube if pouch ineffective.   - Start fiber (banatrol) TID, consider adding second agent tomorrow if continues with high GI output  - Nursing to complete bedside swallow, increase PO intake if passes.     Endocrine:    #Stress and steroid Induced hyperglycemia   #Type II Diabetes   - Hgb A1c  5.6%  - Insulin drip, keep till off steroids and tolerating PO  - Consider transitioning to sliding scale insulin with additional long acting regimen tomorrow pending oral intake and glycemic ranges     ID/ Antibiotics:  # Leukocytosis, resolved  - Afebrile. WBC: 8 today     # Transplant prophylaxis   - Bactrim  - Valcyte  - Maritza     # Transplant immunosuppression   - MMF  - Tac  -  Pred taper -- completed today. Now on 5mg daily.     Heme:     # Acute on chronic blood loss anemia- stable  # Acute blood loss anemia  - Hemoglobin stable today 8.4 from 8.7  - Transfusion goals: Hemoglobin greater than 7, fibrinogen greater than 200, INR less than 2, platelets greater than 20 or sign/symptoms of hypoperfusion  - JUANA sites with serous output. RLQ site > LLQ. Fluids contained with ostomy pouch. Discussed with transplant team if suture of JUANA site necessary. Plan to monitor output today and assess for need tomorrow.     MSK:    # Weakness / Deconditioning  - PT and OT consulted. Appreciate recs.     General Cares/Prophylaxis:    DVT Prophylaxis: Pneumatic Compression Devices  GI Prophylaxis: PPI  Restraints: Restraints for medical healing needed: Not Applicable    Lines/ tubes/ drains:  - Right  IJ MAC line -- will rewire for TLC  - Right IJ  Dialysis catheter   - Peripheral IV    Disposition:  - Transfer to floor with transplant .    Patient seen, findings and plan discussed with surgical ICU staff, Dr. Lopes.    Time spent on this encounter  Billing:  I spent 40 minutes bedside and on the inpatient unit today managing the critical care of Dillon Salter in relation to the issues listed in this note.    VAMSHI Lambert CNP    ====================================    TODAY'S SUBJECTIVE/INTERVAL HISTORY:     Pt awake and alert. Minimal pain. Some paranoia noted.    OBJECTIVE:     Temp:  [96.9  F (36.1  C)-98.2  F (36.8  C)] 97.6  F (36.4  C)  Pulse:  [] 110  Resp:  [16-18] 18  BP: (113-153)/(57-87) 150/85  SpO2:  [91 %-98 %] 95 %  FiO2 (%): 30 %  Resp: 18      I/O last 3 completed shifts:  In: 1625.91 [I.V.:425.91; NG/GT:240]  Out: 135 [Drains:135]    General/Neuro: Awake and alert x4, some paranoia noted  CV: RRR  Pulm: On room air, clear lung sounds on auscultation  Abd: soft; nondistended, incision not saturating. Ostomy bags to JUANA sites - RLQ with serous output, trace output  from LLQ  Extremities: 1+ edema BLE, following commands  Skin: warm and well-perfused.     LABS:   Arterial Blood Gases   Recent Labs   Lab 03/04/23  0944 03/04/23  0744 03/01/23  1423 03/01/23  1317   PH 7.32* 7.30* 7.41 7.38   PCO2 56* 61* 37 42   PO2 83 61* 112* 225*   HCO3 29* 29* 23 25     Complete Blood Count   Recent Labs   Lab 03/06/23  0347 03/05/23 0354 03/04/23 2002 03/04/23  1155   WBC 8.0 11.0 13.9* 12.7*   HGB 8.4* 8.7* 9.9* 9.0*   PLT 90* 68* 71* 57*     Basic Metabolic Panel  Recent Labs   Lab 03/06/23  0902 03/06/23  0817 03/06/23  0654 03/06/23  0555 03/06/23  0349 03/06/23  0347 03/05/23  0358 03/05/23 0354 03/04/23 2057 03/04/23 2002 03/04/23  1159 03/04/23  1155   NA  --   --   --   --   --  136  --  134*  --  137  --  134*   POTASSIUM  --   --   --   --   --  3.2*  --  3.7  --  3.8  --  3.7   CHLORIDE  --   --   --   --   --  100  --  99  --  101  --  100   CO2  --   --   --   --   --  24  --  23  --  24  --  24   BUN  --   --   --   --   --  96.0*  --  73.5*  --  61.2*  --  53.5*   CR  --   --   --   --   --  2.40*  --  2.40*  --  2.11*  --  1.87*   * 115* 106* 111*   < > 119*   < > 180*   < > 146*   < > 175*    < > = values in this interval not displayed.     Liver Function Tests  Recent Labs   Lab 03/06/23 0347 03/05/23 0354 03/04/23 2002 03/04/23  1155   AST 46 54* 75* 82*   * 251* 322* 333*   ALKPHOS 130* 122 125 108   BILITOTAL 1.5* 1.8* 1.9* 1.7*   ALBUMIN 2.8* 3.0* 3.5 3.1*   INR 1.25* 1.27* 1.32* 1.45*     Pancreatic Enzymes  Recent Labs   Lab 03/01/23  0609 02/27/23  1057   LIPASE 19  --    AMYLASE 62 12*     Coagulation Profile  Recent Labs   Lab 03/06/23  0347 03/05/23  0354 03/04/23 2002 03/04/23  1155   INR 1.25* 1.27* 1.32* 1.45*   PTT 24 27 25 25         IMAGING:   Recent Results (from the past 24 hour(s))   XR Chest Port 1 View    Narrative    Exam: XR CHEST PORT 1 VIEW, 3/5/2023 10:46 AM    Comparison: Chest x-ray 3/4/2023    History:  hypoxia    Findings:  Single upright view of the chest. Right IJ CVC tip projects over the  right IJ. Enteric tube projects over the stomach. Trachea is midline.  Stable enlarged cardiac silhouette. Stable low lung volumes. Mild  improvement in pulmonary venous congestion and streaky  perihilar/bibasilar opacities. No appreciable pneumothorax. Esther  project over the abdomen.      Impression    Impression:   Stable cardiomegaly with mild improvement in pulmonary edema.    I have personally reviewed the examination and initial interpretation  and I agree with the findings.    TERRI ROGERS MD         SYSTEM ID:  [id number]

## 2023-03-06 NOTE — PROGRESS NOTES
Major Shift Events: AOX4, able to make needs known. Pupils equal/reactive. Continues to have some mild auditory and visual hallucinations. NSR, afebrile. LS clear on room air. abd soft, tube feeds at 40/hr goal rate. SLP approved for full liquid diet, pt with minimal appetite. Remains on insulin drip. Frequent loose stool. Potential void X 1. MD re-wired R internal jugular. orders placed for transfer to  when bed is available.   Plan: pt went to dialysis at 1700.  For vital signs and complete assessments, please see documentation flowsheets.

## 2023-03-07 ENCOUNTER — APPOINTMENT (OUTPATIENT)
Dept: OCCUPATIONAL THERAPY | Facility: CLINIC | Age: 28
DRG: 005 | End: 2023-03-07
Attending: STUDENT IN AN ORGANIZED HEALTH CARE EDUCATION/TRAINING PROGRAM
Payer: COMMERCIAL

## 2023-03-07 ENCOUNTER — APPOINTMENT (OUTPATIENT)
Dept: SPEECH THERAPY | Facility: CLINIC | Age: 28
DRG: 005 | End: 2023-03-07
Attending: STUDENT IN AN ORGANIZED HEALTH CARE EDUCATION/TRAINING PROGRAM
Payer: COMMERCIAL

## 2023-03-07 ENCOUNTER — APPOINTMENT (OUTPATIENT)
Dept: PHYSICAL THERAPY | Facility: CLINIC | Age: 28
End: 2023-03-07
Attending: SURGERY
Payer: COMMERCIAL

## 2023-03-07 ENCOUNTER — APPOINTMENT (OUTPATIENT)
Dept: ULTRASOUND IMAGING | Facility: CLINIC | Age: 28
DRG: 005 | End: 2023-03-07
Attending: PHYSICIAN ASSISTANT
Payer: COMMERCIAL

## 2023-03-07 DIAGNOSIS — K70.31 ALCOHOLIC CIRRHOSIS OF LIVER WITH ASCITES (H): Chronic | ICD-10-CM

## 2023-03-07 DIAGNOSIS — Z94.4 LIVER REPLACED BY TRANSPLANT (H): Primary | ICD-10-CM

## 2023-03-07 LAB
ALBUMIN SERPL BCG-MCNC: 3.2 G/DL (ref 3.5–5.2)
ALP SERPL-CCNC: 180 U/L (ref 40–129)
ALT SERPL W P-5'-P-CCNC: 155 U/L (ref 10–50)
ANION GAP SERPL CALCULATED.3IONS-SCNC: 9 MMOL/L (ref 7–15)
APTT PPP: 30 SECONDS (ref 22–38)
AST SERPL W P-5'-P-CCNC: 52 U/L (ref 10–50)
BILIRUB DIRECT SERPL-MCNC: 1.61 MG/DL (ref 0–0.3)
BILIRUB SERPL-MCNC: 2.5 MG/DL
BUN SERPL-MCNC: 48.9 MG/DL (ref 6–20)
CALCIUM SERPL-MCNC: 8.8 MG/DL (ref 8.6–10)
CHLORIDE SERPL-SCNC: 100 MMOL/L (ref 98–107)
CREAT SERPL-MCNC: 1.16 MG/DL (ref 0.67–1.17)
DEPRECATED HCO3 PLAS-SCNC: 27 MMOL/L (ref 22–29)
ERYTHROCYTE [DISTWIDTH] IN BLOOD BY AUTOMATED COUNT: 19.9 % (ref 10–15)
FIBRINOGEN PPP-MCNC: 375 MG/DL (ref 170–490)
GFR SERPL CREATININE-BSD FRML MDRD: 87 ML/MIN/1.73M2
GLUCOSE BLDC GLUCOMTR-MCNC: 113 MG/DL (ref 70–99)
GLUCOSE BLDC GLUCOMTR-MCNC: 143 MG/DL (ref 70–99)
GLUCOSE BLDC GLUCOMTR-MCNC: 194 MG/DL (ref 70–99)
GLUCOSE BLDC GLUCOMTR-MCNC: 261 MG/DL (ref 70–99)
GLUCOSE BLDC GLUCOMTR-MCNC: 288 MG/DL (ref 70–99)
GLUCOSE BLDC GLUCOMTR-MCNC: 291 MG/DL (ref 70–99)
GLUCOSE SERPL-MCNC: 151 MG/DL (ref 70–99)
HCT VFR BLD AUTO: 28.1 % (ref 40–53)
HGB BLD-MCNC: 9 G/DL (ref 13.3–17.7)
INR PPP: 1.1 (ref 0.85–1.15)
MAGNESIUM SERPL-MCNC: 1.9 MG/DL (ref 1.7–2.3)
MCH RBC QN AUTO: 31.3 PG (ref 26.5–33)
MCHC RBC AUTO-ENTMCNC: 32 G/DL (ref 31.5–36.5)
MCV RBC AUTO: 98 FL (ref 78–100)
PATH REPORT.COMMENTS IMP SPEC: NORMAL
PATH REPORT.FINAL DX SPEC: NORMAL
PATH REPORT.GROSS SPEC: NORMAL
PATH REPORT.MICROSCOPIC SPEC OTHER STN: NORMAL
PATH REPORT.MICROSCOPIC SPEC OTHER STN: NORMAL
PATH REPORT.RELEVANT HX SPEC: NORMAL
PHOSPHATE SERPL-MCNC: 2.2 MG/DL (ref 2.5–4.5)
PHOTO IMAGE: NORMAL
PLATELET # BLD AUTO: 136 10E3/UL (ref 150–450)
POTASSIUM SERPL-SCNC: 3.8 MMOL/L (ref 3.4–5.3)
PROT SERPL-MCNC: 5 G/DL (ref 6.4–8.3)
RBC # BLD AUTO: 2.88 10E6/UL (ref 4.4–5.9)
SODIUM SERPL-SCNC: 136 MMOL/L (ref 136–145)
WBC # BLD AUTO: 10.2 10E3/UL (ref 4–11)

## 2023-03-07 PROCEDURE — 250N000013 HC RX MED GY IP 250 OP 250 PS 637: Performed by: PHYSICIAN ASSISTANT

## 2023-03-07 PROCEDURE — 97110 THERAPEUTIC EXERCISES: CPT | Mod: GO | Performed by: OCCUPATIONAL THERAPIST

## 2023-03-07 PROCEDURE — 250N000013 HC RX MED GY IP 250 OP 250 PS 637: Performed by: SURGERY

## 2023-03-07 PROCEDURE — 97530 THERAPEUTIC ACTIVITIES: CPT | Mod: GP | Performed by: PHYSICAL THERAPIST

## 2023-03-07 PROCEDURE — 250N000011 HC RX IP 250 OP 636: Performed by: PHYSICIAN ASSISTANT

## 2023-03-07 PROCEDURE — 200N000002 HC R&B ICU UMMC

## 2023-03-07 PROCEDURE — 85384 FIBRINOGEN ACTIVITY: CPT | Performed by: PHYSICIAN ASSISTANT

## 2023-03-07 PROCEDURE — 85027 COMPLETE CBC AUTOMATED: CPT | Performed by: PHYSICIAN ASSISTANT

## 2023-03-07 PROCEDURE — 93975 VASCULAR STUDY: CPT

## 2023-03-07 PROCEDURE — 85610 PROTHROMBIN TIME: CPT | Performed by: PHYSICIAN ASSISTANT

## 2023-03-07 PROCEDURE — 82248 BILIRUBIN DIRECT: CPT | Performed by: SURGERY

## 2023-03-07 PROCEDURE — 250N000013 HC RX MED GY IP 250 OP 250 PS 637: Performed by: NURSE PRACTITIONER

## 2023-03-07 PROCEDURE — 250N000012 HC RX MED GY IP 250 OP 636 PS 637: Performed by: PHYSICIAN ASSISTANT

## 2023-03-07 PROCEDURE — 258N000003 HC RX IP 258 OP 636: Performed by: PHYSICIAN ASSISTANT

## 2023-03-07 PROCEDURE — 99233 SBSQ HOSP IP/OBS HIGH 50: CPT | Mod: 24 | Performed by: INTERNAL MEDICINE

## 2023-03-07 PROCEDURE — 92526 ORAL FUNCTION THERAPY: CPT | Mod: GN | Performed by: SPEECH-LANGUAGE PATHOLOGIST

## 2023-03-07 PROCEDURE — 80053 COMPREHEN METABOLIC PANEL: CPT | Performed by: PHYSICIAN ASSISTANT

## 2023-03-07 PROCEDURE — 250N000013 HC RX MED GY IP 250 OP 250 PS 637

## 2023-03-07 PROCEDURE — 97535 SELF CARE MNGMENT TRAINING: CPT | Mod: GO | Performed by: OCCUPATIONAL THERAPIST

## 2023-03-07 PROCEDURE — 84100 ASSAY OF PHOSPHORUS: CPT | Performed by: PHYSICIAN ASSISTANT

## 2023-03-07 PROCEDURE — 93975 VASCULAR STUDY: CPT | Mod: 26 | Performed by: RADIOLOGY

## 2023-03-07 PROCEDURE — 97129 THER IVNTJ 1ST 15 MIN: CPT | Mod: GO | Performed by: OCCUPATIONAL THERAPIST

## 2023-03-07 PROCEDURE — 83735 ASSAY OF MAGNESIUM: CPT | Performed by: PHYSICIAN ASSISTANT

## 2023-03-07 PROCEDURE — 97161 PT EVAL LOW COMPLEX 20 MIN: CPT | Mod: GP | Performed by: PHYSICAL THERAPIST

## 2023-03-07 PROCEDURE — 85730 THROMBOPLASTIN TIME PARTIAL: CPT | Performed by: PHYSICIAN ASSISTANT

## 2023-03-07 RX ORDER — OLANZAPINE 5 MG/1
5 TABLET, ORALLY DISINTEGRATING ORAL AT BEDTIME
Status: DISCONTINUED | OUTPATIENT
Start: 2023-03-07 | End: 2023-03-14

## 2023-03-07 RX ORDER — ALBUMIN (HUMAN) 12.5 G/50ML
50 SOLUTION INTRAVENOUS
Status: DISCONTINUED | OUTPATIENT
Start: 2023-03-08 | End: 2023-03-08

## 2023-03-07 RX ORDER — ONDANSETRON 4 MG/1
4 TABLET, ORALLY DISINTEGRATING ORAL EVERY 6 HOURS PRN
Status: DISCONTINUED | OUTPATIENT
Start: 2023-03-07 | End: 2023-03-19 | Stop reason: HOSPADM

## 2023-03-07 RX ORDER — URSODIOL 300 MG/1
300 CAPSULE ORAL 2 TIMES DAILY
Status: DISCONTINUED | OUTPATIENT
Start: 2023-03-07 | End: 2023-03-19 | Stop reason: HOSPADM

## 2023-03-07 RX ORDER — ONDANSETRON 2 MG/ML
4 INJECTION INTRAMUSCULAR; INTRAVENOUS EVERY 6 HOURS PRN
Status: DISCONTINUED | OUTPATIENT
Start: 2023-03-07 | End: 2023-03-19 | Stop reason: HOSPADM

## 2023-03-07 RX ADMIN — OXYCODONE HYDROCHLORIDE 2.5 MG: 5 TABLET ORAL at 14:05

## 2023-03-07 RX ADMIN — TACROLIMUS 1 MG: 5 CAPSULE ORAL at 08:58

## 2023-03-07 RX ADMIN — MICAFUNGIN 100 MG: 10 INJECTION, POWDER, LYOPHILIZED, FOR SOLUTION INTRAVENOUS at 09:43

## 2023-03-07 RX ADMIN — ACETAMINOPHEN 650 MG: 325 TABLET ORAL at 06:06

## 2023-03-07 RX ADMIN — ASPIRIN 325 MG ORAL TABLET 325 MG: 325 PILL ORAL at 08:55

## 2023-03-07 RX ADMIN — Medication 3 MG: at 20:14

## 2023-03-07 RX ADMIN — B-COMPLEX W/ C & FOLIC ACID TAB 1 MG 1 TABLET: 1 TAB at 08:55

## 2023-03-07 RX ADMIN — Medication 1 PACKET: at 08:55

## 2023-03-07 RX ADMIN — OXYCODONE HYDROCHLORIDE 2.5 MG: 5 TABLET ORAL at 02:05

## 2023-03-07 RX ADMIN — OXYCODONE HYDROCHLORIDE 2.5 MG: 5 TABLET ORAL at 22:11

## 2023-03-07 RX ADMIN — URSODIOL 300 MG: 300 CAPSULE ORAL at 08:56

## 2023-03-07 RX ADMIN — Medication 1 PACKET: at 13:52

## 2023-03-07 RX ADMIN — OLANZAPINE 5 MG: 5 TABLET, ORALLY DISINTEGRATING ORAL at 21:35

## 2023-03-07 RX ADMIN — Medication 40 MG: at 20:30

## 2023-03-07 RX ADMIN — OXYCODONE HYDROCHLORIDE 2.5 MG: 5 TABLET ORAL at 06:06

## 2023-03-07 RX ADMIN — INSULIN GLARGINE 10 UNITS: 100 INJECTION, SOLUTION SUBCUTANEOUS at 18:01

## 2023-03-07 RX ADMIN — OXYCODONE HYDROCHLORIDE 2.5 MG: 5 TABLET ORAL at 18:12

## 2023-03-07 RX ADMIN — ACETAMINOPHEN 650 MG: 325 TABLET ORAL at 22:11

## 2023-03-07 RX ADMIN — MYCOPHENOLATE MOFETIL 500 MG: 200 POWDER, FOR SUSPENSION ORAL at 08:57

## 2023-03-07 RX ADMIN — TACROLIMUS 1 MG: 5 CAPSULE ORAL at 18:04

## 2023-03-07 RX ADMIN — PREDNISONE 5 MG: 5 TABLET ORAL at 08:55

## 2023-03-07 RX ADMIN — FLUOXETINE HYDROCHLORIDE 60 MG: 40 CAPSULE ORAL at 21:38

## 2023-03-07 RX ADMIN — Medication 25 MCG: at 08:57

## 2023-03-07 RX ADMIN — ONDANSETRON 4 MG: 4 TABLET, ORALLY DISINTEGRATING ORAL at 14:39

## 2023-03-07 RX ADMIN — Medication 40 MG: at 08:57

## 2023-03-07 RX ADMIN — ACETAMINOPHEN 650 MG: 325 TABLET ORAL at 14:05

## 2023-03-07 RX ADMIN — MYCOPHENOLATE MOFETIL 500 MG: 250 CAPSULE ORAL at 20:13

## 2023-03-07 RX ADMIN — Medication 25 MCG: at 08:55

## 2023-03-07 RX ADMIN — URSODIOL 300 MG: 300 CAPSULE ORAL at 20:14

## 2023-03-07 ASSESSMENT — ACTIVITIES OF DAILY LIVING (ADL)
ADLS_ACUITY_SCORE: 49
ADLS_ACUITY_SCORE: 41
ADLS_ACUITY_SCORE: 49
ADLS_ACUITY_SCORE: 41
ADLS_ACUITY_SCORE: 49

## 2023-03-07 NOTE — PLAN OF CARE
Goal Outcome Evaluation:      Plan of Care Reviewed With: patient    Overall Patient Progress: improving Overall Patient Progress: improving    Outcome Evaluation: See Below    Major Shift Events:    Neuro: Alert and oriented x4. No hallucinations noted. Calling appropriately for assistance.  Cardiac: Vital Signs Stable  Resp: On Room Air  GI: Frequent loose BMs, intermittently incontinent due to urgency. NPO at midnight due to possible ERCP tomorrow.  : Small amounts of urine, unable to measure fully due to being mixed with stool.  Muscle/Mobility: Up with assist of 2 and walker. Up in chair majority of day, shifting weight frequently, wheeled in wheelchair in hallway x1.   Pain: PRN tyelonol and oxycodone x1.     PLAN: Transfer out of ICU when bed available, continue with Plan of Care.     For vital signs and complete assessments, please see documentation flowsheets.

## 2023-03-07 NOTE — PROGRESS NOTES
[facility]   Transplant Nephrology Progress Note  Date of Admission:  2/11/2023  Today's Date: 03/07/2023    Recommendations:  - No acute indications for dialysis today.  Will plan HD tomorrow with some volume removal.   - As much as possible, would record urine output.  - Would recommend tunneled dialysis catheter.  - Would hold off on phosphorus supplement as level should increase without dialysis.  - With ongoing diarrhea, may consider changing mycophenolate mofetil to mycophenolic acid.    Assessment & Plan   # ASHISH: Decreased creatinine, as expected following dialysis yesterday.  Unclear urine output with some unmeasured voids as part of watery stools.  No acute indications for dialysis today, but will likely plan HD tomorrow.   - ASHISH likely secondary to hemorrhagic shock and hemodynamic changes following liver transplant, as well as mild HRS prior to transplant.  - HD Info  Access: Temporary Catheter right IJ, Days: MWF, Length: 4.0 hrs, EDW: 66.0 kg, Heparin: No, MARISEL: No, IV Iron: No, Vit D analog: No   - Baseline Creatinine: ~ 0.6-0.8   - Proteinuria: Normal (<0.2 grams)    # Liver Tx: ESLD secondary to alcoholic cirrhosis, s/p OLT 2/28/23.  Trend down in transaminases and total bilirubin.  Followed by Transplant Surgery.    # Immunosuppression: Tacrolimus immediate release (goal 5-8), Mycophenolate mofetil (dose 750 mg every 12 hours) and Prednisone (dose 5 mg daily)   - Induction with Recent Transplant:  Per Liver Tx protocol.   - Changes: Not at this time; Management per Transplant Surgery.   - With ongoing diarrhea, may consider changing mycophenolate mofetil to mycophenolic acid.    # Infection Prophylaxis:   - PJP: Sulfa/TMP (Bactrim)  - CMV: Valganciclovir (Valcyte); CMV IgG Ab recipient and donor negative  - Thrush: Micafungin (Mycamine)  - Fungal: Micafungin (Mycamine)    # Hypertension: Controlled;  Goal BP: < 140/90 (Hospitalization goal)   - Volume  status: Mildly hypervolemic  EDW ~ 66.0 kg   - Changes: Not at this time, but will pull some volume with dialysis tomorrow.    # Diabetes: Controlled (HbA1c <7%) Last HbA1c: 6.1%   - Management as per primary team.    # Anemia in Chronic Disease: Hgb: Stable      MARISEL: No   - Iron studies: Low iron saturation, but high ferritin    # Thrombocytopenia: Trend up in platelet level, now near normal.    # Mineral Bone Disorder:   - Vitamin D; level: Low        On supplement: Yes  - Calcium; level: Normal        On supplement: No  - Phosphorus; level: Normal        On binder: No    # Electrolytes:   - Potassium; level: Normal        On supplement: No; Modulate with HD  - Magnesium; level: High        On supplement: No  - Bicarbonate; level: Normal        On supplement: No    # Malnutrition: Patient on tube feedings.    # Diarrhea: Frequent watery stools, although reportedly a bit less in the last day.  Now started on fiber.  C. diff negative 3/5.   - Would maybe consider changing mycophenolate mofetil to mycophenolic acid.    # EBV IgG Ab Discordance (D+/R-): Will do surveillance EBV PCR qmonth until 12 months post transplant, then q3 months until 2 years post transplant.    # Aspergers/Depression/Hallucinations: Patient appears to be having less hallucinations per his mother.  Seen by Psychiatry and started on olanzapine, as well as continuing on fluoxetine.    # Transplant History:  Etiology of Organ Failure: Alcoholic cirrhosis  Tx: Liver Tx  Transplant: 2/28/2023 (Liver)  Donor Type:  Donor Class:   Crossmatch at time of Tx: negative  DSA at time of Tx: No   Significant changes in immunosuppression: Lower CNI goal due to ASHISH  Significant transplant-related complications: ASHISH    Recommendations were communicated to the primary team via this note.    Antonio Aguila MD   Pager: [phone]    Interval History   Mr. Salter's creatinine is 1.16 (03/07 0421); Decreased after dialysis.  Unclear urine output as some  unmeasured with stool output, but likely not a significant amount of urine.  Other significant labs/tests/vitals: Decreased phosphorus with dialysis.  Otherwise stable electrolytes.  Stable hemoglobin.  No new events overnight.  Patient reports some continued hallucinations, but appears to be better.  No chest pain, but maybe slight shortness of breath.  Some leg swelling.  No nausea and vomiting.  Bowel movements are watery to loose, but less frequent.  No fever, sweats or chills.    Review of Systems   4 point ROS was obtained and negative except as noted in the Interval History.    MEDICATIONS:    aspirin  325 mg Per Feeding Tube Daily     cholecalciferol  25 mcg Per Feeding Tube Daily     fiber modular  1 packet Per Feeding Tube TID     FLUoxetine  60 mg Oral At Bedtime     insulin aspart   Subcutaneous TID w/meals     insulin aspart  1-7 Units Subcutaneous TID AC     insulin aspart  1-5 Units Subcutaneous At Bedtime     insulin glargine  10 Units Subcutaneous Q24H     melatonin  3 mg Oral QPM     micafungin  100 mg Intravenous Q24H     multivitamin RENAL  1 tablet Oral Daily     mycophenolate  500 mg Oral BID    Or     mycophenolate  500 mg Oral or NG Tube BID     OLANZapine zydis  5 mg Oral At Bedtime     pantoprazole  40 mg Per Feeding Tube BID     potassium & sodium phosphates  1 packet Oral or Feeding Tube Q4H     predniSONE  5 mg Oral Daily     protein modular  1 packet Per Feeding Tube BID     sodium chloride (PF)  3 mL Intravenous Q8H     sulfamethoxazole-trimethoprim  1 tablet Oral or Feeding Tube Once per day on Mon Wed Fri     tacrolimus  1 mg Oral BID IS    Or     tacrolimus  1 mg Oral or NG Tube BID IS     ursodiol  300 mg Oral BID     valGANciclovir  450 mg Oral Once per day on Mon Fri    Or     valGANciclovir  450 mg Oral or NG Tube Once per day on Mon Fri     cholecalciferol  25 mcg Oral or Feeding Tube Daily       dextrose       dextrose Stopped (03/01/23 1400)       Physical Exam   Temp  Avg:  97.6  F (36.4  C)  Min: 92.8  F (33.8  C)  Max: 99.5  F (37.5  C)  Arterial Line BP  Min: 78/56  Max: 173/78  Arterial Line MAP (mmHg)  Av.4 mmHg  Min: 56 mmHg  Max: 190 mmHg      Pulse  Av.4  Min: 62  Max: 112 Resp  Avg: 15.7  Min: 8  Max: 34  FiO2 (%)  Av.5 %  Min: 30 %  Max: 100 %  SpO2  Av.6 %  Min: 84 %  Max: 100 %    CVP (mmHg): 8 mmHgBP 135/83 (BP Location: Right arm)   Pulse 96   Temp 98.2  F (36.8  C) (Axillary)   Resp 16   Wt 75.8 kg (167 lb 1.7 oz)   SpO2 97%   BMI 27.81 kg/m     Date 23 0700 - 23 0659   Shift 4282-4808 1741-1319 0536-0756 24 Hour Total   INTAKE   I.V. 156   156   NG/GT 80   80   Enteral 160   160   Shift Total(mL/kg) 396(5.04)   396(5.04)   OUTPUT   Shift Total(mL/kg)       Weight (kg) 78.5 78.5 78.5 78.5      Admit Weight: 65.9 kg (145 lb 4.5 oz)     GENERAL APPEARANCE: alert and no distress, sitting up in a chair  HENT: mouth without ulcers or lesions, NG in place  RESP: lungs clear to auscultation - no rales, rhonchi or wheezes  CV: regular rhythm, normal rate, no rub, no murmur  EDEMA: 1+ LE and dependent edema bilaterally  ABDOMEN: soft, nondistended, mild TTP, bowel sounds normal  MS: extremities normal - no gross deformities noted, no evidence of inflammation in joints, no muscle tenderness  SKIN: no rash  DIALYSIS ACCESS:  Temporary catheter right internal jugular    Data   All labs reviewed by me.  CMP  Recent Labs   Lab 23  0818 23  0421 23  0420 23  0141 23  0349 23  0347 23  0358 23  0354 23  0359 23  0354 23  0219 23  0130 23  1951 23  1945 23  1202 23  1157   NA  --  136  --   --   --  136  --  134*  --  137   < > 137  --  141  --  144  --  139   POTASSIUM  --  3.8  --   --   --  3.2*  --  3.7  --  3.8   < > 4.7  --  4.6  --  3.4  --  4.9   CHLORIDE  --  100  --   --   --  100  --  99  --  101   < > 105  --  108*   --  117*  --  102   CO2  --  27  --   --   --  24  --  23  --  24   < > 24  --  24  --  18*  --  24   ANIONGAP  --  9  --   --   --  12  --  12  --  12   < > 8  --  9  --  9  --  13   * 151* 143* 113*   < > 119*   < > 180*   < > 146*   < > 144*   < > 142*   < > 101*   < > 135*   BUN  --  48.9*  --   --   --  96.0*  --  73.5*  --  61.2*   < > 36.5*  --  34.8*  --  26.4*  --  37.6*   CR  --  1.16  --   --   --  2.40*  --  2.40*  --  2.11*   < > 1.33*  --  1.26*  --  0.98  --  1.39*   GFRESTIMATED  --  87  --   --   --  37*  --  37*  --  43*   < > 74  --  79  --  >90  --  70   SARTHAK  --  8.8  --   --   --  9.0  --  9.2  --  9.4   < > 8.6  --  8.4*  --  6.3*  --  9.5   MAG  --   --   --   --   --   --   --   --   --   --   --  2.4*  --  2.2  --  1.6*  --  2.1   PHOS  --  2.2*  --   --   --  4.5  --  5.0*  --  4.8*   < > 5.2*  --  5.0*  --  3.9  --  5.7*   PROTTOTAL  --  5.0*  --   --   --  4.5*  --  4.8*  --  5.5*   < > 5.0*  --   --   --  3.7*  --  5.0*   ALBUMIN  --  3.2*  --   --   --  2.8*  --  3.0*  --  3.5   < > 3.3*  --   --   --  2.4*  --  3.3*   BILITOTAL  --  2.5*  --   --   --  1.5*  --  1.8*  --  1.9*   < > 2.6*  --   --   --  2.5*  --  4.3*   ALKPHOS  --  180*  --   --   --  130*  --  122  --  125   < > 84  --   --   --  51  --  60   AST  --  52*  --   --   --  46  --  54*  --  75*   < > 262*  --   --   --  264*  --  497*   ALT  --  155*  --   --   --  181*  --  251*  --  322*   < > 546*  --   --   --  422*  --  636*    < > = values in this interval not displayed.     CBC  Recent Labs   Lab 03/07/23 0421 03/06/23 0347 03/05/23 0354 03/04/23 2002   HGB 9.0* 8.4* 8.7* 9.9*   WBC 10.2 8.0 11.0 13.9*   RBC 2.88* 2.69* 2.83* 3.22*   HCT 28.1* 26.5* 27.4* 31.1*   MCV 98 99 97 97   MCH 31.3 31.2 30.7 30.7   MCHC 32.0 31.7 31.8 31.8   RDW 19.9* 19.7* 18.9* 18.7*   * 90* 68* 71*     INR  Recent Labs   Lab 03/07/23 0421 03/06/23 0347 03/05/23 0354 03/04/23 2002   INR 1.10 1.25* 1.27* 1.32*   PTT  30 24 27 25     ABG  Recent Labs   Lab 03/06/23  0003 03/05/23  1036 03/05/23  0740 03/05/23  0354 03/04/23  1155 03/04/23  0944 03/04/23  0744 03/04/23  0339 03/01/23  1423 03/01/23  1317   PH  --   --   --   --   --  7.32* 7.30*  --  7.41 7.38   PCO2  --   --   --   --   --  56* 61*  --  37 42   PO2  --   --   --   --   --  83 61*  --  112* 225*   HCO3  --   --   --   --   --  29* 29*  --  23 25   O2PER 21 21 21 30   < > 30 2   < > 40.0 60.0    < > = values in this interval not displayed.      Urine Studies  Recent Labs   Lab Test 02/27/23  1616 02/24/23  1818 02/22/23  1421 02/19/23  2051   COLOR Yellow Light Yellow Yellow Yellow   APPEARANCE Clear Clear Clear Clear   URINEGLC Negative Negative Negative Negative   URINEBILI Negative Negative Negative Negative   URINEKETONE Negative Negative Negative Negative   SG 1.012 1.009 1.010 1.015   UBLD Negative Negative Negative Negative   URINEPH 5.5 5.0 5.0 5.0   PROTEIN 30* Negative Negative 30*   NITRITE Negative Negative Negative Negative   LEUKEST Negative Negative Negative Negative   RBCU <1 <1 1 26*   WBCU 2 1 2 16*     No lab results found.  PTH  No lab results found.  Iron Studies  Recent Labs   Lab Test 02/22/23  0610 02/20/23  0730 02/16/23  0543 12/20/22  0703 10/06/22  0958 04/07/22  0624   IRON 26*  --   --  249*  --  85   *  --   --   --   --   --    IRONSAT 19  --   --   --   --   --    ASCENCION 1,465* 1,335* 1,725* 2,207* 2,042* 423*       IMAGING:  All imaging studies reviewed by me.

## 2023-03-07 NOTE — PROGRESS NOTES
Transplant Surgery  Inpatient Daily Progress Note  2023    Assessment & Plan: Dillon Salter is a 27 year old male with a past medical history of Asperger's, DM2, and alcoholic cirrhosis c/b esophageal varices and hepatic encephalopathy. He is now s/p  donor liver transplant without stent on 23 with Dr. Sterling. Return to OR for washout and repair of arterial bleeding on 3/1/23.    s/p DDLT 23: POD #7. Tbili 2.5<-1.5<-1.8, AST 52<-46, <-181. LA 0.7.   -continue ASA 325mg daily    Imaging:  -3/1 US: resolution of hematoma, patent vessels  -3/3 US: Patent, resolution of perihepatic hematoma with new perihepatic hematoma up to 9 cm.  -3/7 US pending.   Ammonia 11 (Checked due to AMS)    Immunosuppression management:  Induction: Steroid taper and basiliximab x2 per renal sparing protocol.  Maintenance:   - mg BID, reduce to 500mg BID in setting of diarrhea.  -Tacrolimus 1mg BID,  goal level 5-8 due to renal sparing.  -Prednisone 5mg daily due to tac<8.    Neuro/Psych:  Acute post op pain; Chronic musculoskeletal pain: On oxycodone PTA. Continue Oxycodone 2.5mg Q4 along with Tylenol 650mg Q8-change to PRN, minimize narcotic use due to sedation  MDD, Anxiety, Autism spectrum: Mother is caretaker, lives with parents. PTA fluoxetine as able.   Acute delirium: possible ICU delirium with hallucinations. Continue to minimize narcotics as able. Continue melatonin/seroquel at bedtime.  Hematology:   Anemia of chronic disease/Acute blood loss: Hgb now stable ~8-9   Thrombocytopenia:improving,  PLT improving >100  Coagulopathy: INR now improved to 1.1  Will not plan for subcutaneous heparin for DVT prophylaxis since patient already coagulopathic    Cardiorespiratory:   Post op ventilatory support/hypercapnia/acute pulmonary edema: Extubated 3/3. Lethargic and hypercapnic 3/4 AM, improved with BiPAP. Now stable on room air.   -CXR 3/5: stable cardiomegaly, improved pulmonary edema    Hypovolemic/hemorrhagic shock: Secondary to bleeding. Resolved.   Tachycardia: Now in NSR    GI/Nutrition:   Severe malnutrition in the context of acute illness: Nutrition consulted. FT placed, receiving Novasource Renal, goal 40ml/hr. Regular diet.   Diarrhea: Cdiff neg, On Fiber -increased to TID. Hold stool softeners.    Endocrine:   DM2; Steroid induced hyperglycemia: on insulin gtt initially, now on Lantus 10 units daily, carb coverage and sliding scale insulin prn     Fluid/Electrolytes/Neph:   ASHISH: Present prior to transplant (prerenal-hypovolemia 2/2 blood loss) now exacerbated by liver transplant, shock. CRRT started 3/1/23. Tolerated iHD 3/4, will plan next for MWF. Will plan for tunneled line placement tomorrow with IR.  Hyperkalemia: Resolved with CRRT.   Hypokalemia: K 3.8 this AM. HD.     : Negron removed. Check periodic bladder scans.    Infectious disease: No issues.     Prophylaxis: DVT (mechanical), fall, GI (PPI), fungal (Initially received fluconazole, changed to Micafungin d/t CRRT/re-operation), viral (Valcyte), pneumocystis (Bactrim -restarted 3/6), jalen-op (Zosyn)    Disposition: SICU, transfer to     CHRISTA/Fellow/Resident Provider: Caren Guevara PA-C [id number]     Faculty: Thelma Sterling M.D.    __________________________________________________________________  Transplant History:    2/28/2023 (Liver), Postoperative day: 7     Interval History:  Overnight events: Nausea and diarrhea continues. Hallucinations overnight. Tbili increased to 2.5 from 1.5.     ROS:   A 10-point review of systems was negative except as noted above.    Curent Meds:    [START ON 3/8/2023] sodium chloride 0.9%  300 mL Hemodialysis Machine Once     [START ON 3/8/2023] sodium chloride (PF) 0.9%  10 mL Intracatheter During Dialysis/CRRT (from stock)     [START ON 3/8/2023] sodium chloride (PF) 0.9%  10 mL Intracatheter During Dialysis/CRRT (from stock)     [START ON 3/8/2023] albumin human  250 mL Intravenous Once in  dialysis/CRRT     aspirin  325 mg Per Feeding Tube Daily     cholecalciferol  25 mcg Per Feeding Tube Daily     fiber modular  1 packet Per Feeding Tube TID     FLUoxetine  60 mg Oral At Bedtime     insulin aspart   Subcutaneous TID w/meals     insulin aspart  1-7 Units Subcutaneous TID AC     insulin aspart  1-5 Units Subcutaneous At Bedtime     insulin glargine  10 Units Subcutaneous Q24H     melatonin  3 mg Oral QPM     micafungin  100 mg Intravenous Q24H     multivitamin RENAL  1 tablet Oral Daily     mycophenolate  500 mg Oral BID    Or     mycophenolate  500 mg Oral or NG Tube BID     [START ON 3/8/2023] - MEDICATION INSTRUCTIONS -   Does not apply Once     OLANZapine zydis  5 mg Oral At Bedtime     pantoprazole  40 mg Per Feeding Tube BID     potassium & sodium phosphates  1 packet Oral or Feeding Tube Q4H     predniSONE  5 mg Oral Daily     protein modular  1 packet Per Feeding Tube BID     sodium chloride (PF)  3 mL Intravenous Q8H     [START ON 3/8/2023] sodium chloride (PF)  9 mL Intracatheter During Dialysis/CRRT (from stock)     [START ON 3/8/2023] sodium chloride (PF)  9 mL Intracatheter During Dialysis/CRRT (from stock)     sulfamethoxazole-trimethoprim  1 tablet Oral or Feeding Tube Once per day on Mon Wed Fri     tacrolimus  1 mg Oral BID IS    Or     tacrolimus  1 mg Oral or NG Tube BID IS     ursodiol  300 mg Oral BID     valGANciclovir  450 mg Oral Once per day on Mon Fri    Or     valGANciclovir  450 mg Oral or NG Tube Once per day on Mon Fri     cholecalciferol  25 mcg Oral or Feeding Tube Daily       Physical Exam:     Admit Weight: 65.9 kg (145 lb 4.5 oz)    Current Vitals:   BP (!) 148/80 (BP Location: Left arm)   Pulse 96   Temp 97.9  F (36.6  C) (Oral)   Resp 18   Wt 75.8 kg (167 lb 1.7 oz)   SpO2 97%   BMI 27.81 kg/m      Vital sign ranges:    Temp:  [95.8  F (35.4  C)-98.3  F (36.8  C)] 97.9  F (36.6  C)  Pulse:  [] 96  Resp:  [11-27] 18  BP: (125-157)/() 148/80  Cuff  Mean (mmHg):  [98] 98  SpO2:  [94 %-100 %] 97 %    General Appearance: No acute distress  Skin: warm, dry  Heart: Perfused  Lungs: Nonlabored resps on RA  Abdomen: abdomen soft, non-distended. Incision c/d/i. L JUANA site with ostomy appliance serous  : incontinent  Extremities: edema: generalized +1   Neurologic: Tremor absent. Awake, alert, oriented. Autumn independently.      Frailty Scores     Frailty Scores 12/20/2022    Final Score Frail    Final Score Number 5          Data:   CMP  Recent Labs   Lab 03/07/23  1149 03/07/23  0818 03/07/23  0421 03/06/23  0349 03/06/23  0347 03/05/23  0358 03/05/23  0354 03/03/23  0359 03/03/23  0354 03/01/23  0740 03/01/23  0609   NA  --   --  136  --  136  --  134*   < > 137   < > 142   POTASSIUM  --   --  3.8  --  3.2*  --  3.7   < > 4.7   < > 5.7*   CHLORIDE  --   --  100  --  100  --  99   < > 105   < > 103   CO2  --   --  27  --  24  --  23   < > 24   < > 25   * 194* 151*   < > 119*   < > 180*   < > 144*   < > 114*   BUN  --   --  48.9*  --  96.0*  --  73.5*   < > 36.5*   < > 60.2*   CR  --   --  1.16  --  2.40*  --  2.40*   < > 1.33*   < > 1.67*   GFRESTIMATED  --   --  87  --  37*  --  37*   < > 74   < > 56*   SARTHAK  --   --  8.8  --  9.0  --  9.2   < > 8.6   < > 9.4   ICAW  --   --   --   --  5.2  --  5.1   < > 4.8   < > 4.8   MAG 1.9  --   --   --   --   --   --   --  2.4*   < > 2.1   PHOS  --   --  2.2*  --  4.5  --  5.0*   < > 5.2*   < > 6.7*   AMYLASE  --   --   --   --   --   --   --   --   --   --  62   LIPASE  --   --   --   --   --   --   --   --   --   --  19   ALBUMIN  --   --  3.2*  --  2.8*  --  3.0*   < > 3.3*   < > 2.8*   BILITOTAL  --   --  2.5*  --  1.5*  --  1.8*   < > 2.6*   < > 4.9*   ALKPHOS  --   --  180*  --  130*  --  122   < > 84   < > 43   AST  --   --  52*  --  46  --  54*   < > 262*   < > 347*   ALT  --   --  155*  --  181*  --  251*   < > 546*   < > 268*    < > = values in this interval not displayed.     CBC  Recent Labs   Lab  03/07/23  0421 03/06/23  0347 02/28/23  1850 02/28/23  1703   HGB 9.0* 8.4*   < > 9.8*   WBC 10.2 8.0   < > 6.2   * 90*   < > 94*   A1C  --   --   --  6.1*    < > = values in this interval not displayed.

## 2023-03-07 NOTE — PROGRESS NOTES
CLINICAL NUTRITION SERVICES - BRIEF NOTE   (See RD note on 3/3 for full assessment)     Reason for RD note: Follow up on nutrition plan of care with PO diet advancement    New Findings/Chart Review:  Nutrition support via NDT: Novasource Renal @ 40 ml/hr (960 ml) + 2 pkts prosource TF20 provides 2080 kcal (32kcal/kg), 127 g pro (1.9 gm/kg), 176 g CHO, 688 ml free water, and 0 g fiber daily    Oral Diet/Intake: Full liquid with Ensure TID --> soft & bite sized (level 6) with thin liquids per SLP    Renal: HD planned for tomorrow per nephrology    GI: multiple loose BM's.  Banatrol started 3/6.    Labs: phos 2.2 (L) - restricted renal formula no longer indicated.  Change to standard formula may help with stooling     Meds: Reviewed    Interventions:  - Adjust EN: Osmolite 1.5 Tej @ goal of  50ml/hr  (1200ml/day) + 2 pkts prosource TF20 will provide: 1960 kcals (30 kcal/kg), 115 g PRO (1.7gm/kg), 914 ml free H20, 244 g CHO, and 0 g fiber daily.   - Calorie counts 3/8-3/10  - Specify flavor preference for Ensure: Vanilla  - Add special K bar once daily     Future/Additional Recommendations:  - Monitor PO / ability to cycle feeds  - Stool trends    Nutrition will continue to follow per protocol.    Caren Cheatham, RD, LD, CNSC  4A SICU RD pager: [phone]  Ascom: [phone]  Weekend/Holiday RD pager [phone]

## 2023-03-07 NOTE — PLAN OF CARE
Neuros: Pt somewhat disoriented to self on return from dialysis and was having visual hallucinations. Mentation cleared up overnight and currently AOx4. Follows commands and moves all extremities spontaneously. Baseline tremors. PERRLA. Pain well controlled with PRN oxy and tylenol.  CV: Afebrile. SR to ST. Normotensive.   RR: RA. Clear LS.   GI/: Multiple loose stools overnight. Pt up to commode with Ax2 and GB/pivot. Rectal pouch removed d/t encouragement of regular toileting. Oliguric. TF at goal with standard FWF. Insulin gtt off at 2330 and transitioned to sliding scale insulin.  Drains: BL abd drains. R drain with no output overnight.    Plan: Transfer to  when bed ready. Continue with current cares.    For vital signs and complete assessments, please see documentation flowsheets.

## 2023-03-07 NOTE — PROGRESS NOTES
23 1529   Appointment Info   Signing Clinician's Name / Credentials (PT) Amparo Torres, PT, DPT   Rehab Comments (PT) abdominal precautions   Living Environment   People in Home parent(s)   Current Living Arrangements house   Home Accessibility stairs within home;stairs to enter home   Number of Stairs, Main Entrance 2   Stair Railings, Main Entrance railings on both sides of stairs   Number of Stairs, Within Home, Primary other (see comments)  (6+6, B rails)   Transportation Anticipated family or friend will provide   Living Environment Comments walk-in shower present at home   Self-Care   Usual Activity Tolerance good   Current Activity Tolerance poor   Regular Exercise No   Equipment Currently Used at Home none   Fall history within last six months no   Activity/Exercise/Self-Care Comment IND mobility and ADLs at baseline.   General Information   Onset of Illness/Injury or Date of Surgery 23   Referring Physician Rosita Cohn PA-C   Pertinent History of Current Problem (include personal factors and/or comorbidities that impact the POC) Dillon Salter is a 27 year old male with a past medical history of Asperger's, DM2, and alcoholic cirrhosis c/b esophageal varices and hepatic encephalopathy. He is now s/p  donor liver transplant without stent on 23 with Dr. Sterling. Return to OR for washout and repair of arterial bleeding on 3/1/23.   Existing Precautions/Restrictions fall;abdominal   Cognition   Cognitive Status Comments slightly reduced insight into deficits   Pain Assessment   Patient Currently in Pain No   Posture    Posture Comments slightly reduced upright posture   Range of Motion (ROM)   ROM Comment reduced hip flexion AROM, < full knee ext during LAQ, ankle DF B   Strength (Manual Muscle Testing)   Strength Comments hip flex 3-/5, knee ext 3-/5, ankle DF 3-/5 B   Bed Mobility   Comment, (Bed Mobility) MAXA x 2 sit>supine   Transfers   Comment, (Transfers)  MODA-MAXA x 2 STS   Gait/Stairs (Locomotion)   Comment, (Gait/Stairs) Ambulated x 5 ft with ALEXANDER of 1, FWW and presented with shortened step length, reduced upright posture and stability   Balance   Balance Comments impaired static and dynamic balance; see above   Sensory Examination   Sensory Perception Comments denies deficits   Clinical Impression   Criteria for Skilled Therapeutic Intervention Yes, treatment indicated   PT Diagnosis (PT) impaired mobility   Influenced by the following impairments reduced strength, ROM, balance, activity tolerance   Functional limitations due to impairments below baseline bed mobility, transfers, gait   Clinical Presentation (PT Evaluation Complexity) Stable/Uncomplicated   Clinical Presentation Rationale cliniocal judgement, [facility]   Clinical Decision Making (Complexity) low complexity   Planned Therapy Interventions (PT) gait training;patient/family education;home exercise program;stair training;strengthening;transfer training;balance training;neuromuscular re-education;bed mobility training;ROM (range of motion);postural re-education;stretching;progressive activity/exercise;risk factor education;home program guidelines   Risk & Benefits of therapy have been explained evaluation/treatment results reviewed;care plan/treatment goals reviewed;risks/benefits reviewed;current/potential barriers reviewed;participants voiced agreement with care plan;participants included;patient   PT Total Evaluation Time   PT Eval, Low Complexity Minutes (18102) 5   Physical Therapy Goals   PT Frequency Daily   PT Predicted Duration/Target Date for Goal Attainment 03/21/23   PT Goals Bed Mobility;Transfers;Gait;Stairs   PT: Bed Mobility Within precautions;Supervision/stand-by assist   PT: Transfers Supervision/stand-by assist;Bed to/from chair;Sit to/from stand;Assistive device;Within precautions   PT: Gait Supervision/stand-by assist;Within precautions;100 feet;Rolling walker   PT: Stairs Greater than  10 stairs;Supervision/stand-by assist;Minimal assist

## 2023-03-07 NOTE — PROGRESS NOTES
HEMODIALYSIS TREATMENT NOTE    Date: 3/6/2023  Time: 8:20 PM    Data:  Pre Wt:   78.5 kg  Desired Wt: less than 3 kg   Post Wt:  -2.9 kg  Weight change:2.9 kg  Ultrafiltration - Post Run Net Total Removed (mL): 2859 mL  Vascular Access Status: patent  Dialyzer Rinse: Light, Streaked  Total Blood Volume Processed: 74.44 Liters  Total Dialysis (Treatment) Time: 3.4 Hours        Lab:   No    Assessment/ Interventions:  Received pt alert, oriented and able to communicate his needs known, denies pain and sob.   Pt is on continuous insulin drip, BG checked hourly and dose of insulin changed accordingly as per algorithm.     Ran with K2Ca3 bath via RIJ non-tunneled cvc. Primed with Albumin 5% 250 ml.     HD tx ended 9 mins earlier than the scheduled 3.5 hrs, pt was a bit anxious. Post rinse back done. Lumens locekd with heparin, both 1.6 ml.     See MAR and flowsheet for further details.    Handoff report given to Ainsley PAGE RN and sent back to his room stable.      Plan:    Per renal team

## 2023-03-07 NOTE — CONSULTS
Impression: ASHISH following liver transplant  -right IJ placed 3/6  -nephrology recommending tunneled line later this week if dialysis is still indicated    Plan: IR tunneled HD line placement and non tunneled removal on Friday 3/10  -NPO midnight 3/10  -update IR if UOP increasing, kidney function improving and tunneled line should be postponed/cancelled    Discussed with transplant Kelsea Child

## 2023-03-08 ENCOUNTER — APPOINTMENT (OUTPATIENT)
Dept: SPEECH THERAPY | Facility: CLINIC | Age: 28
DRG: 005 | End: 2023-03-08
Attending: STUDENT IN AN ORGANIZED HEALTH CARE EDUCATION/TRAINING PROGRAM
Payer: COMMERCIAL

## 2023-03-08 ENCOUNTER — APPOINTMENT (OUTPATIENT)
Dept: OCCUPATIONAL THERAPY | Facility: CLINIC | Age: 28
DRG: 005 | End: 2023-03-08
Attending: STUDENT IN AN ORGANIZED HEALTH CARE EDUCATION/TRAINING PROGRAM
Payer: COMMERCIAL

## 2023-03-08 ENCOUNTER — APPOINTMENT (OUTPATIENT)
Dept: PHYSICAL THERAPY | Facility: CLINIC | Age: 28
DRG: 005 | End: 2023-03-08
Attending: STUDENT IN AN ORGANIZED HEALTH CARE EDUCATION/TRAINING PROGRAM
Payer: COMMERCIAL

## 2023-03-08 LAB
ALBUMIN SERPL BCG-MCNC: 3 G/DL (ref 3.5–5.2)
ALP SERPL-CCNC: 249 U/L (ref 40–129)
ALT SERPL W P-5'-P-CCNC: 116 U/L (ref 10–50)
ANION GAP SERPL CALCULATED.3IONS-SCNC: 9 MMOL/L (ref 7–15)
AST SERPL W P-5'-P-CCNC: 37 U/L (ref 10–50)
BILIRUB DIRECT SERPL-MCNC: 1.79 MG/DL (ref 0–0.3)
BILIRUB SERPL-MCNC: 2.5 MG/DL
BUN SERPL-MCNC: 67.2 MG/DL (ref 6–20)
CALCIUM SERPL-MCNC: 8.9 MG/DL (ref 8.6–10)
CHLORIDE SERPL-SCNC: 98 MMOL/L (ref 98–107)
CREAT SERPL-MCNC: 1.35 MG/DL (ref 0.67–1.17)
DEPRECATED HCO3 PLAS-SCNC: 26 MMOL/L (ref 22–29)
ERYTHROCYTE [DISTWIDTH] IN BLOOD BY AUTOMATED COUNT: 20.1 % (ref 10–15)
GFR SERPL CREATININE-BSD FRML MDRD: 73 ML/MIN/1.73M2
GLUCOSE BLDC GLUCOMTR-MCNC: 178 MG/DL (ref 70–99)
GLUCOSE BLDC GLUCOMTR-MCNC: 181 MG/DL (ref 70–99)
GLUCOSE BLDC GLUCOMTR-MCNC: 184 MG/DL (ref 70–99)
GLUCOSE BLDC GLUCOMTR-MCNC: 248 MG/DL (ref 70–99)
GLUCOSE BLDC GLUCOMTR-MCNC: 266 MG/DL (ref 70–99)
GLUCOSE BLDC GLUCOMTR-MCNC: 284 MG/DL (ref 70–99)
GLUCOSE SERPL-MCNC: 211 MG/DL (ref 70–99)
HCT VFR BLD AUTO: 26.4 % (ref 40–53)
HGB BLD-MCNC: 8.5 G/DL (ref 13.3–17.7)
INR PPP: 1.15 (ref 0.85–1.15)
MAGNESIUM SERPL-MCNC: 1.9 MG/DL (ref 1.7–2.3)
MCH RBC QN AUTO: 31.6 PG (ref 26.5–33)
MCHC RBC AUTO-ENTMCNC: 32.2 G/DL (ref 31.5–36.5)
MCV RBC AUTO: 98 FL (ref 78–100)
PHOSPHATE SERPL-MCNC: 2.9 MG/DL (ref 2.5–4.5)
PLATELET # BLD AUTO: 138 10E3/UL (ref 150–450)
POTASSIUM SERPL-SCNC: 3.9 MMOL/L (ref 3.4–5.3)
PROT SERPL-MCNC: 4.9 G/DL (ref 6.4–8.3)
RBC # BLD AUTO: 2.69 10E6/UL (ref 4.4–5.9)
SODIUM SERPL-SCNC: 133 MMOL/L (ref 136–145)
TACROLIMUS BLD-MCNC: 4.9 UG/L (ref 5–15)
TME LAST DOSE: ABNORMAL H
TME LAST DOSE: ABNORMAL H
WBC # BLD AUTO: 7.9 10E3/UL (ref 4–11)

## 2023-03-08 PROCEDURE — 83735 ASSAY OF MAGNESIUM: CPT | Performed by: PHYSICIAN ASSISTANT

## 2023-03-08 PROCEDURE — 250N000012 HC RX MED GY IP 250 OP 636 PS 637: Performed by: PHYSICIAN ASSISTANT

## 2023-03-08 PROCEDURE — 250N000013 HC RX MED GY IP 250 OP 250 PS 637: Performed by: PHYSICIAN ASSISTANT

## 2023-03-08 PROCEDURE — 99233 SBSQ HOSP IP/OBS HIGH 50: CPT | Mod: 24 | Performed by: INTERNAL MEDICINE

## 2023-03-08 PROCEDURE — 97530 THERAPEUTIC ACTIVITIES: CPT | Mod: GP | Performed by: PHYSICAL THERAPIST

## 2023-03-08 PROCEDURE — 85027 COMPLETE CBC AUTOMATED: CPT | Performed by: PHYSICIAN ASSISTANT

## 2023-03-08 PROCEDURE — P9045 ALBUMIN (HUMAN), 5%, 250 ML: HCPCS | Performed by: INTERNAL MEDICINE

## 2023-03-08 PROCEDURE — 97116 GAIT TRAINING THERAPY: CPT | Mod: GP | Performed by: PHYSICAL THERAPIST

## 2023-03-08 PROCEDURE — 120N000011 HC R&B TRANSPLANT UMMC

## 2023-03-08 PROCEDURE — 258N000003 HC RX IP 258 OP 636: Performed by: INTERNAL MEDICINE

## 2023-03-08 PROCEDURE — 90937 HEMODIALYSIS REPEATED EVAL: CPT

## 2023-03-08 PROCEDURE — 85610 PROTHROMBIN TIME: CPT | Performed by: PHYSICIAN ASSISTANT

## 2023-03-08 PROCEDURE — 97140 MANUAL THERAPY 1/> REGIONS: CPT | Mod: GO | Performed by: OCCUPATIONAL THERAPIST

## 2023-03-08 PROCEDURE — 258N000003 HC RX IP 258 OP 636: Performed by: PHYSICIAN ASSISTANT

## 2023-03-08 PROCEDURE — 80197 ASSAY OF TACROLIMUS: CPT | Performed by: PHYSICIAN ASSISTANT

## 2023-03-08 PROCEDURE — 258N000001 HC RX 258: Performed by: PHYSICIAN ASSISTANT

## 2023-03-08 PROCEDURE — 250N000011 HC RX IP 250 OP 636: Performed by: INTERNAL MEDICINE

## 2023-03-08 PROCEDURE — 250N000011 HC RX IP 250 OP 636: Performed by: PHYSICIAN ASSISTANT

## 2023-03-08 PROCEDURE — 92526 ORAL FUNCTION THERAPY: CPT | Mod: GN

## 2023-03-08 PROCEDURE — 84100 ASSAY OF PHOSPHORUS: CPT | Performed by: PHYSICIAN ASSISTANT

## 2023-03-08 PROCEDURE — 82248 BILIRUBIN DIRECT: CPT | Performed by: PHYSICIAN ASSISTANT

## 2023-03-08 PROCEDURE — 80053 COMPREHEN METABOLIC PANEL: CPT | Performed by: PHYSICIAN ASSISTANT

## 2023-03-08 RX ORDER — TACROLIMUS 1 MG/1
2 CAPSULE ORAL
Status: DISCONTINUED | OUTPATIENT
Start: 2023-03-08 | End: 2023-03-10

## 2023-03-08 RX ORDER — MYCOPHENOLIC ACID 360 MG/1
360 TABLET, DELAYED RELEASE ORAL
Status: DISCONTINUED | OUTPATIENT
Start: 2023-03-08 | End: 2023-03-17

## 2023-03-08 RX ADMIN — TACROLIMUS 1 MG: 5 CAPSULE ORAL at 09:15

## 2023-03-08 RX ADMIN — OXYCODONE HYDROCHLORIDE 2.5 MG: 5 TABLET ORAL at 06:57

## 2023-03-08 RX ADMIN — URSODIOL 300 MG: 300 CAPSULE ORAL at 21:00

## 2023-03-08 RX ADMIN — Medication 25 MCG: at 09:14

## 2023-03-08 RX ADMIN — Medication 40 MG: at 09:15

## 2023-03-08 RX ADMIN — OXYCODONE HYDROCHLORIDE 2.5 MG: 5 TABLET ORAL at 16:48

## 2023-03-08 RX ADMIN — OXYCODONE HYDROCHLORIDE 2.5 MG: 5 TABLET ORAL at 20:59

## 2023-03-08 RX ADMIN — ACETAMINOPHEN 650 MG: 325 TABLET ORAL at 06:57

## 2023-03-08 RX ADMIN — MYCOPHENOLATE MOFETIL 500 MG: 200 POWDER, FOR SUSPENSION ORAL at 09:14

## 2023-03-08 RX ADMIN — ASPIRIN 325 MG ORAL TABLET 325 MG: 325 PILL ORAL at 09:14

## 2023-03-08 RX ADMIN — OXYCODONE HYDROCHLORIDE 2.5 MG: 5 TABLET ORAL at 02:34

## 2023-03-08 RX ADMIN — ACETAMINOPHEN 650 MG: 325 TABLET ORAL at 12:36

## 2023-03-08 RX ADMIN — FLUOXETINE HYDROCHLORIDE 60 MG: 40 CAPSULE ORAL at 21:16

## 2023-03-08 RX ADMIN — MICAFUNGIN 100 MG: 10 INJECTION, POWDER, LYOPHILIZED, FOR SOLUTION INTRAVENOUS at 09:39

## 2023-03-08 RX ADMIN — PREDNISONE 5 MG: 5 TABLET ORAL at 09:14

## 2023-03-08 RX ADMIN — Medication: at 11:14

## 2023-03-08 RX ADMIN — ALBUMIN HUMAN 250 ML: 0.05 INJECTION, SOLUTION INTRAVENOUS at 11:13

## 2023-03-08 RX ADMIN — Medication 25 MCG: at 09:15

## 2023-03-08 RX ADMIN — DEXTROSE MONOHYDRATE 1000 ML: 100 INJECTION, SOLUTION INTRAVENOUS at 06:24

## 2023-03-08 RX ADMIN — INSULIN GLARGINE 10 UNITS: 100 INJECTION, SOLUTION SUBCUTANEOUS at 18:20

## 2023-03-08 RX ADMIN — SULFAMETHOXAZOLE AND TRIMETHOPRIM 1 TABLET: 400; 80 TABLET ORAL at 21:12

## 2023-03-08 RX ADMIN — ONDANSETRON 4 MG: 4 TABLET, ORALLY DISINTEGRATING ORAL at 12:36

## 2023-03-08 RX ADMIN — Medication 3 MG: at 21:00

## 2023-03-08 RX ADMIN — B-COMPLEX W/ C & FOLIC ACID TAB 1 MG 1 TABLET: 1 TAB at 09:14

## 2023-03-08 RX ADMIN — ACETAMINOPHEN 650 MG: 325 TABLET ORAL at 21:15

## 2023-03-08 RX ADMIN — TACROLIMUS 2 MG: 5 CAPSULE ORAL at 19:03

## 2023-03-08 RX ADMIN — SODIUM CHLORIDE 300 ML: 9 INJECTION, SOLUTION INTRAVENOUS at 11:12

## 2023-03-08 RX ADMIN — URSODIOL 300 MG: 300 CAPSULE ORAL at 09:14

## 2023-03-08 ASSESSMENT — ACTIVITIES OF DAILY LIVING (ADL)
ADLS_ACUITY_SCORE: 40
ADLS_ACUITY_SCORE: 36
ADLS_ACUITY_SCORE: 36
ADLS_ACUITY_SCORE: 40
ADLS_ACUITY_SCORE: 41

## 2023-03-08 NOTE — PROGRESS NOTES
[facility]   Transplant Nephrology Progress Note  Date of Admission:  2/11/2023  Today's Date: 03/08/2023    Recommendations:  - Will plan HD today.   - As much as possible, would record urine output.  - Would recommend tunneled dialysis catheter.  - With ongoing diarrhea, may consider changing mycophenolate mofetil to mycophenolic acid.    Assessment & Plan   # ASHISH: Increased creatinine.  Unclear urine output with some unmeasured voids as part of watery stools., but likely anuric/oliguric.  Will plan HD today with volume removal.   - ASHISH likely secondary to hemorrhagic shock and hemodynamic changes following liver transplant, as well as mild HRS prior to transplant.  - HD Info  Access: Temporary Catheter right IJ, Days: MWF, Length: 4.0 hrs, EDW: 66.0 kg, Heparin: No, MARISEL: No, IV Iron: No, Vit D analog: No   - Baseline Creatinine: ~ 0.6-0.8   - Proteinuria: Normal (<0.2 grams)    # Liver Tx: ESLD secondary to alcoholic cirrhosis, s/p OLT 2/28/23.  Trend down in transaminases and total bilirubin.  Followed by Transplant Surgery.    # Immunosuppression: Tacrolimus immediate release (goal 5-8), Mycophenolate mofetil (dose 500 mg every 12 hours) and Prednisone (dose 5 mg daily)   - Induction with Recent Transplant:  Per Liver Tx protocol.   - Changes: Not at this time; Management per Transplant Surgery.   - With ongoing diarrhea, may consider changing mycophenolate mofetil to mycophenolic acid.    # Infection Prophylaxis:   - PJP: Sulfa/TMP (Bactrim)  - CMV: Valganciclovir (Valcyte); CMV IgG Ab recipient and donor negative  - Thrush: Micafungin (Mycamine)  - Fungal: Micafungin (Mycamine)    # Hypertension: Controlled;  Goal BP: < 140/90 (Hospitalization goal)   - Volume status: Mildly hypervolemic  EDW ~ 66.0 kg   - Changes: Not at this time, but will pull some volume with dialysis.    # Diabetes: Controlled (HbA1c <7%) Last HbA1c: 6.1%   - Management as per primary  team.    # Anemia in Chronic Disease: Hgb: Stable      MARISEL: No   - Iron studies: Low iron saturation, but high ferritin    # Thrombocytopenia: Stable, near normal platelet level.    # Mineral Bone Disorder:   - Vitamin D; level: Low        On supplement: Yes  - Calcium; level: Normal        On supplement: No  - Phosphorus; level: Normal        On binder: No    # Electrolytes:   - Potassium; level: Normal        On supplement: No  - Magnesium; level: Normal        On supplement: No  - Bicarbonate; level: Normal        On supplement: No    # Malnutrition: Patient on tube feedings.    # Diarrhea: Frequent watery stools, although reportedly a bit less in the last day.  Now started on fiber.  C. diff negative 3/5.   - Would maybe consider changing mycophenolate mofetil to mycophenolic acid.    # EBV IgG Ab Discordance (D+/R-): Will do surveillance EBV PCR qmonth until 12 months post transplant, then q3 months until 2 years post transplant.    # Aspergers/Depression/Hallucinations: Patient appears to be having less hallucinations per his mother.  Seen by Psychiatry and started on olanzapine, as well as continuing on fluoxetine.    # Transplant History:  Etiology of Organ Failure: Alcoholic cirrhosis  Tx: Liver Tx  Transplant: 2/28/2023 (Liver)  Donor Type:  Donor Class:   Crossmatch at time of Tx: negative  DSA at time of Tx: No   Significant changes in immunosuppression: Lower CNI goal due to ASHISH  Significant transplant-related complications: ASHISH    Recommendations were communicated to the primary team via this note.    Antonio Aguila MD   Pager: [phone]    Interval History   Mr. Salter's creatinine is 1.35 (03/08 0424); Trend up.  Minimal urine output and remains anuric.  Trend down in transaminases.  Other significant labs/tests/vitals: Stable electrolytes.  Stable hemoglobin.  No new events overnight.  No chest pain or shortness of breath.  Stable leg swelling.  Occasional nausea, but no vomiting.  Bowel  movements are loose.  No fever, sweats or chills.    Review of Systems   4 point ROS was obtained and negative except as noted in the Interval History.    MEDICATIONS:    sodium chloride (PF) 0.9%  10 mL Intracatheter During Dialysis/CRRT (from stock)     sodium chloride (PF) 0.9%  10 mL Intracatheter During Dialysis/CRRT (from stock)     aspirin  325 mg Per Feeding Tube Daily     cholecalciferol  25 mcg Per Feeding Tube Daily     fiber modular  1 packet Per Feeding Tube TID     FLUoxetine  60 mg Oral At Bedtime     insulin aspart  1-6 Units Subcutaneous Q4H     [Held by provider] insulin aspart   Subcutaneous TID w/meals     [Held by provider] insulin aspart  1-7 Units Subcutaneous TID AC     [Held by provider] insulin aspart  1-5 Units Subcutaneous At Bedtime     insulin glargine  10 Units Subcutaneous Q24H     melatonin  3 mg Oral QPM     micafungin  100 mg Intravenous Q24H     multivitamin RENAL  1 tablet Oral Daily     mycophenolate  500 mg Oral BID    Or     mycophenolate  500 mg Oral or NG Tube BID     OLANZapine zydis  5 mg Oral At Bedtime     pantoprazole  40 mg Per Feeding Tube BID     predniSONE  5 mg Oral Daily     protein modular  1 packet Per Feeding Tube BID     sodium chloride (PF)  3 mL Intravenous Q8H     sodium chloride (PF)  9 mL Intracatheter During Dialysis/CRRT (from stock)     sodium chloride (PF)  9 mL Intracatheter During Dialysis/CRRT (from stock)     sulfamethoxazole-trimethoprim  1 tablet Oral or Feeding Tube Once per day on      tacrolimus  1 mg Oral BID IS    Or     tacrolimus  1 mg Oral or NG Tube BID IS     ursodiol  300 mg Oral BID     valGANciclovir  450 mg Oral Once per day on     Or     valGANciclovir  450 mg Oral or NG Tube Once per day on      cholecalciferol  25 mcg Oral or Feeding Tube Daily       dextrose       dextrose 1,000 mL (23 0624)       Physical Exam   Temp  Av.6  F (36.4  C)  Min: 92.8  F (33.8  C)  Max: 99.5  F (37.5   C)  Arterial Line BP  Min: 78/56  Max: 173/78  Arterial Line MAP (mmHg)  Av.4 mmHg  Min: 56 mmHg  Max: 190 mmHg      Pulse  Av.4  Min: 62  Max: 112 Resp  Avg: 15.7  Min: 8  Max: 34  FiO2 (%)  Av.5 %  Min: 30 %  Max: 100 %  SpO2  Av.6 %  Min: 84 %  Max: 100 %    CVP (mmHg): 8 mmHgBP (!) 140/91   Pulse 95   Temp 97.5  F (36.4  C) (Oral)   Resp 16   Wt 75.8 kg (167 lb 1.7 oz)   SpO2 97%   BMI 27.81 kg/m     Date 23 07 - 23 0659   Shift 5567-0478 8765-1178 6207-5653 24 Hour Total   INTAKE   I.V. 156   156   NG/GT 80   80   Enteral 160   160   Shift Total(mL/kg) 396(5.04)   396(5.04)   OUTPUT   Shift Total(mL/kg)       Weight (kg) 78.5 78.5 78.5 78.5      Admit Weight: 65.9 kg (145 lb 4.5 oz)     GENERAL APPEARANCE: alert and no distress  HENT: mouth without ulcers or lesions, NG in place  RESP: lungs clear to auscultation - no rales, rhonchi or wheezes  CV: regular rhythm, normal rate, no rub, no murmur  EDEMA: 1+ LE and dependent edema bilaterally  ABDOMEN: soft, nondistended, mild TTP, bowel sounds normal  MS: extremities normal - no gross deformities noted, no evidence of inflammation in joints, no muscle tenderness  SKIN: no rash  DIALYSIS ACCESS:  Temporary catheter right internal jugular    Data   All labs reviewed by me.  CMP  Recent Labs   Lab 23  0918 23  0433 23  0424 23  0045 23  1801 23  1149 23  0818 23  0421 23  0349 23  0347 23  0358 23  0354 23  0359 23  0354 23  0219 23  0130   NA  --   --  133*  --   --   --   --  136  --  136  --  134*   < > 137  --  141   POTASSIUM  --   --  3.9  --   --   --   --  3.8  --  3.2*  --  3.7   < > 4.7  --  4.6   CHLORIDE  --   --  98  --   --   --   --  100  --  100  --  99   < > 105  --  108*   CO2  --   --  26  --   --   --   --  27  --  24  --  23   < > 24  --  24   ANIONGAP  --   --  9  --   --   --   --  9  --  12  --  12   < > 8   --  9   * 181* 211* 248*   < > 261*   < > 151*   < > 119*   < > 180*   < > 144*   < > 142*   BUN  --   --  67.2*  --   --   --   --  48.9*  --  96.0*  --  73.5*   < > 36.5*  --  34.8*   CR  --   --  1.35*  --   --   --   --  1.16  --  2.40*  --  2.40*   < > 1.33*  --  1.26*   GFRESTIMATED  --   --  73  --   --   --   --  87  --  37*  --  37*   < > 74  --  79   SARTHAK  --   --  8.9  --   --   --   --  8.8  --  9.0  --  9.2   < > 8.6  --  8.4*   MAG  --   --  1.9  --   --  1.9  --   --   --   --   --   --   --  2.4*  --  2.2   PHOS  --   --  2.9  --   --   --   --  2.2*  --  4.5  --  5.0*   < > 5.2*  --  5.0*   PROTTOTAL  --   --  4.9*  --   --   --   --  5.0*  --  4.5*  --  4.8*   < > 5.0*  --   --    ALBUMIN  --   --  3.0*  --   --   --   --  3.2*  --  2.8*  --  3.0*   < > 3.3*  --   --    BILITOTAL  --   --  2.5*  --   --   --   --  2.5*  --  1.5*  --  1.8*   < > 2.6*  --   --    ALKPHOS  --   --  249*  --   --   --   --  180*  --  130*  --  122   < > 84  --   --    AST  --   --  37  --   --   --   --  52*  --  46  --  54*   < > 262*  --   --    ALT  --   --  116*  --   --   --   --  155*  --  181*  --  251*   < > 546*  --   --     < > = values in this interval not displayed.     CBC  Recent Labs   Lab 03/08/23  0424 03/07/23  0421 03/06/23  0347 03/05/23  0354   HGB 8.5* 9.0* 8.4* 8.7*   WBC 7.9 10.2 8.0 11.0   RBC 2.69* 2.88* 2.69* 2.83*   HCT 26.4* 28.1* 26.5* 27.4*   MCV 98 98 99 97   MCH 31.6 31.3 31.2 30.7   MCHC 32.2 32.0 31.7 31.8   RDW 20.1* 19.9* 19.7* 18.9*   * 136* 90* 68*     INR  Recent Labs   Lab 03/08/23  0424 03/07/23 0421 03/06/23  0347 03/05/23  0354 03/04/23 2002   INR 1.15 1.10 1.25* 1.27* 1.32*   PTT  --  30 24 27 25     ABG  Recent Labs   Lab 03/06/23  0003 03/05/23  1036 03/05/23  0740 03/05/23  0354 03/04/23  1155 03/04/23  0944 03/04/23  0744 03/04/23  0339 03/01/23  1423 03/01/23  1317   PH  --   --   --   --   --  7.32* 7.30*  --  7.41 7.38   PCO2  --   --   --   --   --   56* 61*  --  37 42   PO2  --   --   --   --   --  83 61*  --  112* 225*   HCO3  --   --   --   --   --  29* 29*  --  23 25   O2PER 21 21 21 30   < > 30 2   < > 40.0 60.0    < > = values in this interval not displayed.      Urine Studies  Recent Labs   Lab Test 02/27/23  1616 02/24/23  1818 02/22/23  1421 02/19/23  2051   COLOR Yellow Light Yellow Yellow Yellow   APPEARANCE Clear Clear Clear Clear   URINEGLC Negative Negative Negative Negative   URINEBILI Negative Negative Negative Negative   URINEKETONE Negative Negative Negative Negative   SG 1.012 1.009 1.010 1.015   UBLD Negative Negative Negative Negative   URINEPH 5.5 5.0 5.0 5.0   PROTEIN 30* Negative Negative 30*   NITRITE Negative Negative Negative Negative   LEUKEST Negative Negative Negative Negative   RBCU <1 <1 1 26*   WBCU 2 1 2 16*     No lab results found.  PTH  No lab results found.  Iron Studies  Recent Labs   Lab Test 02/22/23  0610 02/20/23  0730 02/16/23  0543 12/20/22  0703 10/06/22  0958 04/07/22  0624   IRON 26*  --   --  249*  --  85   *  --   --   --   --   --    IRONSAT 19  --   --   --   --   --    ASCENCION 1,465* 1,335* 1,725* 2,207* 2,042* 423*       IMAGING:  All imaging studies reviewed by me.   normal...

## 2023-03-08 NOTE — PROGRESS NOTES
Transplant Social Work Services Progress Note      Date of Initial Social Work Evaluation: 2022  Collaborated with: Liver Transplant Evaluation, [facility] admissions (Merry Johnston), outpatient transplant coordinator-Vicky Castro    Data: Dillon underwent a  donor liver transplant on 23.   Intervention: I met with patient and called his mother Leticia and provided supportive counseling.  I reviewed with Leticia the potential for Dillon to require Transitional Care or Acute Rehab.    Assessment: Dillon is having some abdominal discomfort today, though he is able to make his needs known.  As we were talking Dillon asked me if he had already had a liver transplant.   I shared with him that he had indeed had a liver transplant.  He seemed accepting of this information.  Dillon has had mild delirium this hospitalization, and psychiatry evaluated him on 3/6/23.  Dillon has a history of Autism Spectrum Disorder, Depression and Alcohol Use Disorder.  Education provided by : Transitional Care/Acute Rehab  Plan:    Discharge Plans in Progress: [facility] are following patient.    Barriers to d/c plan: medical stability, CRRT    Follow up Plan: I am off tomorrow through 3/19.  MILLER Bennett will be covering in my absence. Her pager is [phone].  Weekend social work is available by pager. Units: 4A, 4C, & 4E Pager: [phone]         CHRISTIAN Robledo, MILLER  Liver Transplant   Phone [phone]  Pager [phone]

## 2023-03-08 NOTE — PROGRESS NOTES
Patient transferred to . Report given to the gaylee over phone. Patients VSS, A/Ox4, and ambulatory with assist of 1-2 with gait belt. All patient belonging sent with.

## 2023-03-08 NOTE — PROGRESS NOTES
Transplant Surgery  Inpatient Daily Progress Note  2023    Assessment & Plan: Dillon Salter is a 27 year old male with a past medical history of Asperger's, DM2, and alcoholic cirrhosis c/b esophageal varices and hepatic encephalopathy. He is now s/p  donor liver transplant without stent on 23 with Dr. Sterling. Return to OR for washout and repair of arterial bleeding on 3/1/23.    s/p DDLT 23: POD #8. Tbili 2.5<-2.5<-1.5<-1.8, AST 37<-52<-46, <-155<-181. LA 0.7.   -continue ASA 325mg daily    Imaging:  -3/1 US: resolution of hematoma, patent vessels  -3/3 US: Patent, resolution of perihepatic hematoma with new perihepatic hematoma up to 9 cm.  -3/7 US with good flow.   Ammonia 11 (Checked due to AMS)    Immunosuppression management:  Induction: Steroid taper and basiliximab x2 per renal sparing protocol.  Maintenance:   - mg BID initially, reduced to 500mg BID in setting of diarrhea. Change to Myfortic 360 mg BID 3/8.   -Tacrolimus 2mg BID,  goal level increased to 8-10 due to concern for rejection with elevated LFTs  -Prednisone 5mg daily due to tac<8.    Neuro/Psych:  Acute post op pain; Chronic musculoskeletal pain: On oxycodone PTA. Continue Oxycodone 2.5mg Q4 along with Tylenol 650mg Q8-change to PRN, minimize narcotic use due to sedation  MDD, Anxiety, Autism spectrum: Mother is caretaker, lives with parents. PTA fluoxetine as able.   Acute delirium: possible ICU delirium with hallucinations. Continue to minimize narcotics as able. Continue melatonin/seroquel at bedtime.  Hematology:   Anemia of chronic disease/Acute blood loss: Hgb now stable ~8-9   Thrombocytopenia:improving,  PLT improving >100  Coagulopathy: INR now improved to 1.2  Will not plan for subcutaneous heparin for DVT prophylaxis since patient already coagulopathic    Cardiorespiratory:   Post op ventilatory support/hypercapnia/acute pulmonary edema: Extubated 3/3. Lethargic and hypercapnic 3/4 AM,  improved with BiPAP. Now stable on room air.   -CXR 3/5: stable cardiomegaly, improved pulmonary edema   Hypovolemic/hemorrhagic shock: Secondary to bleeding. Resolved.   Tachycardia: Now in NSR    GI/Nutrition:   Severe malnutrition in the context of acute illness: Nutrition consulted. FT placed, receiving Novasource Renal, goal 40ml/hr. Regular diet.   Diarrhea: Cdiff neg, On Fiber -increased to TID. Hold stool softeners.    Endocrine:   DM2; Steroid induced hyperglycemia: on insulin gtt initially, now on Lantus 10 units daily, carb coverage and sliding scale insulin prn     Fluid/Electrolytes/Neph:   ASHISH: Present prior to transplant (prerenal-hypovolemia 2/2 blood loss) now exacerbated by liver transplant, shock. CRRT started 3/1/23. Tolerated iHD 3/4, will plan next for MWF. Will plan for tunneled line placement Friday with IR.  Hyperkalemia: Resolved with CRRT.   Hypokalemia: K 3.9 this AM. HD.     : Negron removed. Check periodic bladder scans.    Infectious disease: No issues.     Prophylaxis: DVT (mechanical), fall, GI (PPI), fungal (Initially received fluconazole, changed to Micafungin d/t CRRT/re-operation), viral (Valcyte), pneumocystis (Bactrim -restarted 3/6), jalen-op (Zosyn)    Disposition: SICU, transfer to  when bed available     CHRISTA/Fellow/Resident Provider: Caren Guevara PA-C [id number]     Faculty: Thelma Sterling M.D.    __________________________________________________________________  Transplant History:    2/28/2023 (Liver), Postoperative day: 8     Interval History:  Overnight events: Nausea and diarrhea continues.     ROS:   A 10-point review of systems was negative except as noted above.    Curent Meds:    aspirin  325 mg Per Feeding Tube Daily     cholecalciferol  25 mcg Per Feeding Tube Daily     fiber modular  1 packet Per Feeding Tube TID     FLUoxetine  60 mg Oral At Bedtime     insulin aspart  1-6 Units Subcutaneous Q4H     [Held by provider] insulin aspart   Subcutaneous TID w/meals      [Held by provider] insulin aspart  1-7 Units Subcutaneous TID AC     [Held by provider] insulin aspart  1-5 Units Subcutaneous At Bedtime     insulin glargine  10 Units Subcutaneous Q24H     melatonin  3 mg Oral QPM     micafungin  100 mg Intravenous Q24H     multivitamin RENAL  1 tablet Oral Daily     mycophenolic acid  360 mg Oral BID IS     OLANZapine zydis  5 mg Oral At Bedtime     pantoprazole  40 mg Per Feeding Tube BID     predniSONE  5 mg Oral Daily     protein modular  1 packet Per Feeding Tube BID     sodium chloride (PF)  3 mL Intravenous Q8H     sulfamethoxazole-trimethoprim  1 tablet Oral or Feeding Tube Once per day on Mon Wed Fri     tacrolimus  2 mg Oral BID IS    Or     tacrolimus  2 mg Oral or NG Tube BID IS     ursodiol  300 mg Oral BID     valGANciclovir  450 mg Oral Once per day on Mon Fri    Or     valGANciclovir  450 mg Oral or NG Tube Once per day on Mon Fri     cholecalciferol  25 mcg Oral or Feeding Tube Daily       Physical Exam:     Admit Weight: 65.9 kg (145 lb 4.5 oz)    Current Vitals:   BP (!) 140/77 (BP Location: Right arm, Cuff Size: Adult Small)   Pulse 96   Temp 97.5  F (36.4  C) (Oral)   Resp 20   Wt 75.8 kg (167 lb 1.7 oz)   SpO2 97%   BMI 27.81 kg/m      Vital sign ranges:    Temp:  [97.5  F (36.4  C)-98.5  F (36.9  C)] 97.5  F (36.4  C)  Pulse:  [] 96  Resp:  [16-20] 20  BP: (114-148)/() 140/77  SpO2:  [95 %-99 %] 97 %    General Appearance: No acute distress  Skin: warm, dry  Heart: Perfused  Lungs: Nonlabored resps on RA  Abdomen: abdomen soft, non-distended. Incision c/d/i. L JUANA site with ostomy appliance serous  : incontinent  Extremities: edema: generalized +2  Neurologic: Tremor absent. Awake, alert, oriented.     Frailty Scores     Frailty Scores 12/20/2022    Final Score Frail    Final Score Number 5          Data:   CMP  Recent Labs   Lab 03/08/23  1529 03/08/23  1241 03/08/23  0433 03/08/23  0424 03/07/23  1801 03/07/23  1149 03/07/23  0818  03/07/23 0421 03/06/23 0349 03/06/23 0347 03/05/23  0358 03/05/23  0354   NA  --   --   --  133*  --   --   --  136  --  136  --  134*   POTASSIUM  --   --   --  3.9  --   --   --  3.8  --  3.2*  --  3.7   CHLORIDE  --   --   --  98  --   --   --  100  --  100  --  99   CO2  --   --   --  26  --   --   --  27  --  24  --  23   * 266*   < > 211*   < > 261*   < > 151*   < > 119*   < > 180*   BUN  --   --   --  67.2*  --   --   --  48.9*  --  96.0*  --  73.5*   CR  --   --   --  1.35*  --   --   --  1.16  --  2.40*  --  2.40*   GFRESTIMATED  --   --   --  73  --   --   --  87  --  37*  --  37*   SARTHAK  --   --   --  8.9  --   --   --  8.8  --  9.0  --  9.2   ICAW  --   --   --   --   --   --   --   --   --  5.2  --  5.1   MAG  --   --   --  1.9  --  1.9  --   --   --   --   --   --    PHOS  --   --   --  2.9  --   --   --  2.2*  --  4.5  --  5.0*   ALBUMIN  --   --   --  3.0*  --   --   --  3.2*  --  2.8*  --  3.0*   BILITOTAL  --   --   --  2.5*  --   --   --  2.5*  --  1.5*  --  1.8*   ALKPHOS  --   --   --  249*  --   --   --  180*  --  130*  --  122   AST  --   --   --  37  --   --   --  52*  --  46  --  54*   ALT  --   --   --  116*  --   --   --  155*  --  181*  --  251*    < > = values in this interval not displayed.     CBC  Recent Labs   Lab 03/08/23  0424 03/07/23  0421   HGB 8.5* 9.0*   WBC 7.9 10.2   * 136*

## 2023-03-08 NOTE — PLAN OF CARE
Major Shift Events:  Up to the bathroom 4 times with 2 staff assist, walker and transfer belt. Observed fatigue after activity. Incontinent of stool. Alert and oriented. No hallucination observed. PRN pain meds for generalized pain after activity. VSS  Plan: Dialysis today, encourage activity  For vital signs and complete assessments, please see documentation flowsheets.            Goal Outcome Evaluation:      Plan of Care Reviewed With: patient, parent    Overall Patient Progress: improvingOverall Patient Progress: improving    Outcome Evaluation: Up with walker and 2 staff assist. Per mother, he seems much stronger today

## 2023-03-08 NOTE — PROGRESS NOTES
HEMODIALYSIS TREATMENT NOTE    Date: 3/8/2023  Time: 11:30 AM    Data:  Pre Wt:   75.8 kg  Desired Wt: 71.8  kg   Post Wt:  71.8  kg   Weight change: 4 kg  Ultrafiltration - Post Run Net Total Removed (mL): 4000 mL  Vascular Access Status: patent  Dialyzer Rinse: Light  Total Blood Volume Processed: 87 Liters  Total Dialysis (Treatment) Time: 4 Hours    Lab:   No    Interventions:  Pt.dialyzed for 4hrs via CVC with 4 UF  BFR:400, DFR:600, Dialysate bath: K:3,CA:2.25  Pt. Tolerated HD well  Albumin 250ml prime to line prior to HD  Assisted positioning and attending needs   CVC lumen locked with saline and changed cap  Handoff report given to CONI Farris     Assessment:  Received on ICU bed with NGT tube, using room air  A/Ox4, Spo2:99%, MA is 90's, regular in rhythm  Bipedal (+2) edema noted   CVC is clean, intact and dry    Plan:    Per renal team

## 2023-03-09 ENCOUNTER — APPOINTMENT (OUTPATIENT)
Dept: CT IMAGING | Facility: CLINIC | Age: 28
DRG: 005 | End: 2023-03-09
Attending: INTERNAL MEDICINE
Payer: COMMERCIAL

## 2023-03-09 ENCOUNTER — APPOINTMENT (OUTPATIENT)
Dept: SPEECH THERAPY | Facility: CLINIC | Age: 28
DRG: 005 | End: 2023-03-09
Attending: STUDENT IN AN ORGANIZED HEALTH CARE EDUCATION/TRAINING PROGRAM
Payer: COMMERCIAL

## 2023-03-09 ENCOUNTER — APPOINTMENT (OUTPATIENT)
Dept: OCCUPATIONAL THERAPY | Facility: CLINIC | Age: 28
DRG: 005 | End: 2023-03-09
Attending: STUDENT IN AN ORGANIZED HEALTH CARE EDUCATION/TRAINING PROGRAM
Payer: COMMERCIAL

## 2023-03-09 LAB
ALBUMIN SERPL BCG-MCNC: 3.1 G/DL (ref 3.5–5.2)
ALBUMIN SERPL BCG-MCNC: 3.2 G/DL (ref 3.5–5.2)
ALP SERPL-CCNC: 277 U/L (ref 40–129)
ALP SERPL-CCNC: 294 U/L (ref 40–129)
ALT SERPL W P-5'-P-CCNC: 90 U/L (ref 10–50)
ALT SERPL W P-5'-P-CCNC: 92 U/L (ref 10–50)
ANION GAP SERPL CALCULATED.3IONS-SCNC: 9 MMOL/L (ref 7–15)
AST SERPL W P-5'-P-CCNC: 33 U/L (ref 10–50)
AST SERPL W P-5'-P-CCNC: 33 U/L (ref 10–50)
BILIRUB DIRECT SERPL-MCNC: 2.68 MG/DL (ref 0–0.3)
BILIRUB SERPL-MCNC: 3.3 MG/DL
BILIRUB SERPL-MCNC: 3.4 MG/DL
BUN SERPL-MCNC: 31.8 MG/DL (ref 6–20)
CALCIUM SERPL-MCNC: 8 MG/DL (ref 8.6–10)
CHLORIDE SERPL-SCNC: 99 MMOL/L (ref 98–107)
CREAT SERPL-MCNC: 1.12 MG/DL (ref 0.67–1.17)
DEPRECATED HCO3 PLAS-SCNC: 27 MMOL/L (ref 22–29)
ERYTHROCYTE [DISTWIDTH] IN BLOOD BY AUTOMATED COUNT: 20.3 % (ref 10–15)
GFR SERPL CREATININE-BSD FRML MDRD: >90 ML/MIN/1.73M2
GLUCOSE BLDC GLUCOMTR-MCNC: 206 MG/DL (ref 70–99)
GLUCOSE BLDC GLUCOMTR-MCNC: 231 MG/DL (ref 70–99)
GLUCOSE BLDC GLUCOMTR-MCNC: 260 MG/DL (ref 70–99)
GLUCOSE BLDC GLUCOMTR-MCNC: 264 MG/DL (ref 70–99)
GLUCOSE BLDC GLUCOMTR-MCNC: 285 MG/DL (ref 70–99)
GLUCOSE BLDC GLUCOMTR-MCNC: 294 MG/DL (ref 70–99)
GLUCOSE SERPL-MCNC: 211 MG/DL (ref 70–99)
HCT VFR BLD AUTO: 24.7 % (ref 40–53)
HGB BLD-MCNC: 7.8 G/DL (ref 13.3–17.7)
HOLD SPECIMEN: NORMAL
INR PPP: 1.19 (ref 0.85–1.15)
MAGNESIUM SERPL-MCNC: 1.6 MG/DL (ref 1.7–2.3)
MCH RBC QN AUTO: 31.6 PG (ref 26.5–33)
MCHC RBC AUTO-ENTMCNC: 31.6 G/DL (ref 31.5–36.5)
MCV RBC AUTO: 100 FL (ref 78–100)
PHOSPHATE SERPL-MCNC: 2.2 MG/DL (ref 2.5–4.5)
PHOSPHATE SERPL-MCNC: 2.6 MG/DL (ref 2.5–4.5)
PLATELET # BLD AUTO: 158 10E3/UL (ref 150–450)
POTASSIUM SERPL-SCNC: 4 MMOL/L (ref 3.4–5.3)
PROT SERPL-MCNC: 5.1 G/DL (ref 6.4–8.3)
PROT SERPL-MCNC: 5.2 G/DL (ref 6.4–8.3)
RBC # BLD AUTO: 2.47 10E6/UL (ref 4.4–5.9)
SODIUM SERPL-SCNC: 135 MMOL/L (ref 136–145)
WBC # BLD AUTO: 7.6 10E3/UL (ref 4–11)

## 2023-03-09 PROCEDURE — 70450 CT HEAD/BRAIN W/O DYE: CPT

## 2023-03-09 PROCEDURE — 85610 PROTHROMBIN TIME: CPT | Performed by: PHYSICIAN ASSISTANT

## 2023-03-09 PROCEDURE — 250N000012 HC RX MED GY IP 250 OP 636 PS 637: Performed by: PHYSICIAN ASSISTANT

## 2023-03-09 PROCEDURE — 250N000011 HC RX IP 250 OP 636: Performed by: PHYSICIAN ASSISTANT

## 2023-03-09 PROCEDURE — 250N000013 HC RX MED GY IP 250 OP 250 PS 637: Performed by: STUDENT IN AN ORGANIZED HEALTH CARE EDUCATION/TRAINING PROGRAM

## 2023-03-09 PROCEDURE — 83735 ASSAY OF MAGNESIUM: CPT | Performed by: PHYSICIAN ASSISTANT

## 2023-03-09 PROCEDURE — 250N000013 HC RX MED GY IP 250 OP 250 PS 637: Performed by: PHYSICIAN ASSISTANT

## 2023-03-09 PROCEDURE — 99233 SBSQ HOSP IP/OBS HIGH 50: CPT | Mod: 24 | Performed by: INTERNAL MEDICINE

## 2023-03-09 PROCEDURE — 250N000009 HC RX 250: Performed by: SURGERY

## 2023-03-09 PROCEDURE — 80053 COMPREHEN METABOLIC PANEL: CPT | Performed by: PHYSICIAN ASSISTANT

## 2023-03-09 PROCEDURE — 250N000013 HC RX MED GY IP 250 OP 250 PS 637: Performed by: INTERNAL MEDICINE

## 2023-03-09 PROCEDURE — 70450 CT HEAD/BRAIN W/O DYE: CPT | Mod: 26 | Performed by: RADIOLOGY

## 2023-03-09 PROCEDURE — 258N000003 HC RX IP 258 OP 636: Performed by: SURGERY

## 2023-03-09 PROCEDURE — 36592 COLLECT BLOOD FROM PICC: CPT | Performed by: PHYSICIAN ASSISTANT

## 2023-03-09 PROCEDURE — 258N000003 HC RX IP 258 OP 636: Performed by: PHYSICIAN ASSISTANT

## 2023-03-09 PROCEDURE — 120N000011 HC R&B TRANSPLANT UMMC

## 2023-03-09 PROCEDURE — 92526 ORAL FUNCTION THERAPY: CPT | Mod: GN

## 2023-03-09 PROCEDURE — 97535 SELF CARE MNGMENT TRAINING: CPT | Mod: GO

## 2023-03-09 PROCEDURE — 85027 COMPLETE CBC AUTOMATED: CPT | Performed by: PHYSICIAN ASSISTANT

## 2023-03-09 PROCEDURE — 90937 HEMODIALYSIS REPEATED EVAL: CPT

## 2023-03-09 PROCEDURE — 84100 ASSAY OF PHOSPHORUS: CPT | Performed by: PHYSICIAN ASSISTANT

## 2023-03-09 PROCEDURE — 82248 BILIRUBIN DIRECT: CPT | Performed by: PHYSICIAN ASSISTANT

## 2023-03-09 PROCEDURE — 84100 ASSAY OF PHOSPHORUS: CPT | Performed by: SURGERY

## 2023-03-09 PROCEDURE — 250N000011 HC RX IP 250 OP 636: Performed by: INTERNAL MEDICINE

## 2023-03-09 PROCEDURE — 258N000003 HC RX IP 258 OP 636: Performed by: INTERNAL MEDICINE

## 2023-03-09 PROCEDURE — 36415 COLL VENOUS BLD VENIPUNCTURE: CPT | Performed by: PHYSICIAN ASSISTANT

## 2023-03-09 RX ORDER — MAGNESIUM OXIDE 400 MG/1
400 TABLET ORAL EVERY 24 HOURS
Status: DISCONTINUED | OUTPATIENT
Start: 2023-03-09 | End: 2023-03-19 | Stop reason: HOSPADM

## 2023-03-09 RX ORDER — HEPARIN SODIUM 5000 [USP'U]/.5ML
5000 INJECTION, SOLUTION INTRAVENOUS; SUBCUTANEOUS EVERY 12 HOURS
Status: DISCONTINUED | OUTPATIENT
Start: 2023-03-09 | End: 2023-03-19 | Stop reason: HOSPADM

## 2023-03-09 RX ORDER — ALBUMIN (HUMAN) 12.5 G/50ML
50 SOLUTION INTRAVENOUS
Status: DISCONTINUED | OUTPATIENT
Start: 2023-03-09 | End: 2023-03-09

## 2023-03-09 RX ADMIN — FLUOXETINE HYDROCHLORIDE 60 MG: 40 CAPSULE ORAL at 22:16

## 2023-03-09 RX ADMIN — Medication 5 MG: at 20:41

## 2023-03-09 RX ADMIN — ACETAMINOPHEN 650 MG: 325 TABLET ORAL at 04:27

## 2023-03-09 RX ADMIN — OLANZAPINE 5 MG: 5 TABLET, ORALLY DISINTEGRATING ORAL at 22:16

## 2023-03-09 RX ADMIN — TACROLIMUS 2 MG: 5 CAPSULE ORAL at 18:11

## 2023-03-09 RX ADMIN — Medication 40 MG: at 08:25

## 2023-03-09 RX ADMIN — ACETAMINOPHEN 650 MG: 325 TABLET ORAL at 20:02

## 2023-03-09 RX ADMIN — ONDANSETRON 4 MG: 4 TABLET, ORALLY DISINTEGRATING ORAL at 20:08

## 2023-03-09 RX ADMIN — SODIUM PHOSPHATE, MONOBASIC, MONOHYDRATE AND SODIUM PHOSPHATE, DIBASIC, ANHYDROUS 9 MMOL: 276; 142 INJECTION, SOLUTION INTRAVENOUS at 05:45

## 2023-03-09 RX ADMIN — MICAFUNGIN 100 MG: 10 INJECTION, POWDER, LYOPHILIZED, FOR SOLUTION INTRAVENOUS at 08:35

## 2023-03-09 RX ADMIN — INSULIN GLARGINE 10 UNITS: 100 INJECTION, SOLUTION SUBCUTANEOUS at 16:35

## 2023-03-09 RX ADMIN — MYCOPHENOLIC ACID 360 MG: 360 TABLET, DELAYED RELEASE ORAL at 08:25

## 2023-03-09 RX ADMIN — MAGNESIUM OXIDE TAB 400 MG (241.3 MG ELEMENTAL MG) 400 MG: 400 (241.3 MG) TAB at 16:34

## 2023-03-09 RX ADMIN — URSODIOL 300 MG: 300 CAPSULE ORAL at 20:47

## 2023-03-09 RX ADMIN — OXYCODONE HYDROCHLORIDE 2.5 MG: 5 TABLET ORAL at 20:01

## 2023-03-09 RX ADMIN — URSODIOL 300 MG: 300 CAPSULE ORAL at 08:25

## 2023-03-09 RX ADMIN — TACROLIMUS 2 MG: 1 CAPSULE ORAL at 08:25

## 2023-03-09 RX ADMIN — OXYCODONE HYDROCHLORIDE 2.5 MG: 5 TABLET ORAL at 15:36

## 2023-03-09 RX ADMIN — MYCOPHENOLIC ACID 360 MG: 360 TABLET, DELAYED RELEASE ORAL at 18:11

## 2023-03-09 RX ADMIN — OXYCODONE HYDROCHLORIDE 2.5 MG: 5 TABLET ORAL at 04:17

## 2023-03-09 RX ADMIN — B-COMPLEX W/ C & FOLIC ACID TAB 1 MG 1 TABLET: 1 TAB at 08:24

## 2023-03-09 RX ADMIN — OXYCODONE HYDROCHLORIDE 2.5 MG: 5 TABLET ORAL at 00:15

## 2023-03-09 RX ADMIN — Medication 25 MCG: at 08:26

## 2023-03-09 RX ADMIN — ONDANSETRON 4 MG: 4 TABLET, ORALLY DISINTEGRATING ORAL at 09:57

## 2023-03-09 RX ADMIN — SODIUM CHLORIDE 300 ML: 9 INJECTION, SOLUTION INTRAVENOUS at 13:23

## 2023-03-09 RX ADMIN — ONDANSETRON 4 MG: 4 TABLET, ORALLY DISINTEGRATING ORAL at 14:20

## 2023-03-09 RX ADMIN — MYCOPHENOLIC ACID 360 MG: 360 TABLET, DELAYED RELEASE ORAL at 00:15

## 2023-03-09 RX ADMIN — PREDNISONE 5 MG: 5 TABLET ORAL at 08:25

## 2023-03-09 RX ADMIN — Medication 25 MCG: at 08:25

## 2023-03-09 RX ADMIN — ASPIRIN 325 MG ORAL TABLET 325 MG: 325 PILL ORAL at 08:25

## 2023-03-09 RX ADMIN — Medication 40 MG: at 20:47

## 2023-03-09 RX ADMIN — OXYCODONE HYDROCHLORIDE 2.5 MG: 5 TABLET ORAL at 08:25

## 2023-03-09 RX ADMIN — HEPARIN SODIUM 5000 UNITS: 5000 INJECTION, SOLUTION INTRAVENOUS; SUBCUTANEOUS at 16:35

## 2023-03-09 ASSESSMENT — ACTIVITIES OF DAILY LIVING (ADL)
ADLS_ACUITY_SCORE: 41
ADLS_ACUITY_SCORE: 38
ADLS_ACUITY_SCORE: 41
ADLS_ACUITY_SCORE: 38
ADLS_ACUITY_SCORE: 41
ADLS_ACUITY_SCORE: 38
ADLS_ACUITY_SCORE: 38
ADLS_ACUITY_SCORE: 41
ADLS_ACUITY_SCORE: 38
ADLS_ACUITY_SCORE: 39

## 2023-03-09 NOTE — PROGRESS NOTES
"Transplant Surgery    S: Patient describes getting out of bed to adjust the television screen and then falling backwards and hitting his head twice.  His mother describes him \"almost falling straight back.\" He denies pain in head/vision changes, ROS negative.     O:   Vitals: /73 (BP Location: Left arm)   Pulse 105   Temp 99.1  F (37.3  C) (Oral)   Resp 16   Wt 72.3 kg (159 lb 6.4 oz)   SpO2 97%   BMI 26.53 kg/m      Gen: Resting comfortably in bed  Skin: warm, dry   CV: Well perfused  Resp: Unlabored on RA  GI: abdomen soft, jalen-incisional scabbing rash noted. Incision closed with staples and open to air  : No Negron  Ext: MAEI  Neuro: CN II-XII grossly intact; A&Ox4    A/P: Dillon Salter is a 27 year old male POD#9 after DDLT. Nurse notified provider that patient fell and hit his head. His neurological exam was grossly intact, no contusions noted.     -STAT Head CT  -Fall precautions placed    Kelsea Child NP   Transplant Surgery #[id number]      "

## 2023-03-09 NOTE — PROGRESS NOTES
BP (!) 140/83 (BP Location: Right arm, Cuff Size: Adult Small)   Pulse 96   Temp 97.8  F (36.6  C) (Oral)   Resp 18   Wt 75.8 kg (167 lb 1.7 oz)   SpO2 98%   BMI 27.81 kg/m      Shift: 8646-5469  Isolation Status: NA  VSS on RA, afebrile  Neuro: Aox4  Behaviors: pleasant & cooperative  B  Respiratory: WDL  Cardiac: WDL  Pain/Nausea: Pain 10/10  PRN: Oxycodone  Diet: Reg  IV Access:PIV  Infusion D 10  Lines/Drains: Tube feeding  GI/: voidng, BM 3/7/23  Skin: rash above incision  Mobility: SBA x2  Plan: On call messaged, called back, coming to see pt for rash and pain management.

## 2023-03-09 NOTE — PLAN OF CARE
Goal Outcome Evaluation:      Plan of Care Reviewed With: patient, family    Overall Patient Progress: improvingOverall Patient Progress: improving    Outcome Evaluation: Pt transferred out of ICU. SLP ok'd pt for regular diet 3/09.   Multiple visits with pt cut short today then pt gone to dialysis for the afternoon. Able to discuss cycling TF and working on PO. Left handouts about post transplant diet, encouraged patient to review, will follow up to discuss post transplant diet recommendations. See RD note 3/9 for details.    Josey Donahue RDN, LD  7A/Obs RD pager: [phone]  Weekend/Holiday RD pager: [phone]

## 2023-03-09 NOTE — PROGRESS NOTES
[facility]   Transplant Nephrology Progress Note  Date of Admission:  2/11/2023  Today's Date: 03/09/2023    Recommendations:  - Will plan short HD today for UF with continued volume overload.   - As much as possible, would record urine output.  - Would recommend tunneled dialysis catheter.  - Recommend starting magnesium oxide 400 mg daily with lunch.    Assessment & Plan   # ASHISH: Decreased creatinine with dialysis.  Unclear urine output with some unmeasured voids as part of watery stools., but likely anuric/oliguric.  Will plan HD today with volume removal.   - ASHISH likely secondary to hemorrhagic shock and hemodynamic changes following liver transplant, as well as mild HRS prior to transplant.  - HD Info  Access: Temporary Catheter right IJ, Days: MWF, Length: 4.0 hrs, EDW: 66.0 kg, Heparin: No, MARISEL: No, IV Iron: No, Vit D analog: No   - Baseline Creatinine: ~ 0.6-0.8   - Proteinuria: Normal (<0.2 grams)    # Liver Tx: ESLD secondary to alcoholic cirrhosis, s/p OLT 2/28/23.  Trend down in transaminases, but increased total bilirubin.  Followed by Transplant Surgery.    # Immunosuppression: Tacrolimus immediate release (goal 5-8), Mycophenolic acid (dose 360 mg every 12 hours) and Prednisone (dose 5 mg daily)   - Induction with Recent Transplant:  Per Liver Tx protocol.   - Changes: Not at this time; Management per Transplant Surgery.   - With ongoing diarrhea, may consider changing mycophenolate mofetil to mycophenolic acid.    # Infection Prophylaxis:   - PJP: Sulfa/TMP (Bactrim)  - CMV: Valganciclovir (Valcyte); CMV IgG Ab recipient and donor negative  - Thrush: Micafungin (Mycamine)  - Fungal: Micafungin (Mycamine)    # Blood Pressure: Controlled;  Goal BP: < 140/90 (Hospitalization goal)   - Volume status: Mildly hypervolemic  EDW ~ 66.0 kg   - Changes: Not at this time, but will pull some volume with dialysis.    # Diabetes: Controlled (HbA1c <7%) Last HbA1c:  6.1%   - Management as per primary team.    # Anemia in Chronic Disease: Hgb: Trend down      MARISEL: No   - Iron studies: Low iron saturation, but high ferritin    # Mineral Bone Disorder:   - Vitamin D; level: Low        On supplement: Yes  - Calcium; level: Normal        On supplement: No  - Phosphorus; level: Normal        On binder: No    # Electrolytes:   - Potassium; level: Normal        On supplement: No  - Magnesium; level: Low        On supplement: No; Recommend starting magnesium oxide 400 mg daily with lunch.  - Bicarbonate; level: Normal        On supplement: No    # Malnutrition: Patient on tube feedings.    # Diarrhea: Some loose stools.  Now started on fiber.  C. diff negative 3/5.    # EBV IgG Ab Discordance (D+/R-): Will do surveillance EBV PCR qmonth until 12 months post transplant, then q3 months until 2 years post transplant.    # Aspergers/Depression/Hallucinations: Patient appears to be having less hallucinations per his mother.  Seen by Psychiatry and started on olanzapine, as well as continuing on fluoxetine.    # Transplant History:  Etiology of Organ Failure: Alcoholic cirrhosis  Tx: Liver Tx  Transplant: 2/28/2023 (Liver)  Donor Type:  Donor Class:   Crossmatch at time of Tx: negative  DSA at time of Tx: No   Significant changes in immunosuppression: Lower CNI goal due to ASHISH  Significant transplant-related complications: ASHISH    Recommendations were communicated to the primary team via this note.    Antonio Aguila MD   Pager: [phone]    Interval History   Mr. Salter's creatinine is 1.12 (03/09 0134); Decreased with dialysis.  No urine output recorded and remains anuric.  Trend down in transaminases, but increased total bilirubin.  Other significant labs/tests/vitals: Stable electrolytes.  Trend down in hemoglobin.  No new events overnight.  No chest pain or shortness of breath.  Stable leg swelling.  Some nausea, but no vomiting.  Bowel movements are loose.  No fever, sweats or  chills.    Review of Systems   4 point ROS was obtained and negative except as noted in the Interval History.    MEDICATIONS:    aspirin  325 mg Per Feeding Tube Daily     cholecalciferol  25 mcg Per Feeding Tube Daily     fiber modular  1 packet Per Feeding Tube TID     FLUoxetine  60 mg Oral At Bedtime     insulin aspart  1-6 Units Subcutaneous Q4H     [Held by provider] insulin aspart   Subcutaneous TID w/meals     [Held by provider] insulin aspart  1-7 Units Subcutaneous TID AC     [Held by provider] insulin aspart  1-5 Units Subcutaneous At Bedtime     insulin glargine  10 Units Subcutaneous Q24H     melatonin  5 mg Oral QPM     micafungin  100 mg Intravenous Q24H     multivitamin RENAL  1 tablet Oral Daily     mycophenolic acid  360 mg Oral BID IS     OLANZapine zydis  5 mg Oral At Bedtime     pantoprazole  40 mg Per Feeding Tube BID     predniSONE  5 mg Oral Daily     protein modular  1 packet Per Feeding Tube BID     sodium chloride (PF)  3 mL Intravenous Q8H     sulfamethoxazole-trimethoprim  1 tablet Oral or Feeding Tube Once per day on      tacrolimus  2 mg Oral BID IS    Or     tacrolimus  2 mg Oral or NG Tube BID IS     ursodiol  300 mg Oral BID     valGANciclovir  450 mg Oral Once per day on     Or     valGANciclovir  450 mg Oral or NG Tube Once per day on      cholecalciferol  25 mcg Oral or Feeding Tube Daily       dextrose Stopped (23 1544)     dextrose Stopped (23 1438)       Physical Exam   Temp  Av.6  F (36.4  C)  Min: 92.8  F (33.8  C)  Max: 99.5  F (37.5  C)  Arterial Line BP  Min: 78/56  Max: 173/78  Arterial Line MAP (mmHg)  Av.4 mmHg  Min: 56 mmHg  Max: 190 mmHg      Pulse  Av.4  Min: 62  Max: 112 Resp  Avg: 15.7  Min: 8  Max: 34  FiO2 (%)  Av.5 %  Min: 30 %  Max: 100 %  SpO2  Av.6 %  Min: 84 %  Max: 100 %    CVP (mmHg): 8 mmHgBP (!) 142/76   Pulse 99   Temp 99.3  F (37.4  C) (Oral)   Resp 16   Wt 72.3 kg (159 lb 6.4 oz)    SpO2 98%   BMI 26.53 kg/m     Date 03/06/23 0700 - 03/07/23 0659   Shift 4681-2881 5556-7484 0484-5589 24 Hour Total   INTAKE   I.V. 156   156   NG/GT 80   80   Enteral 160   160   Shift Total(mL/kg) 396(5.04)   396(5.04)   OUTPUT   Shift Total(mL/kg)       Weight (kg) 78.5 78.5 78.5 78.5      Admit Weight: 65.9 kg (145 lb 4.5 oz)     GENERAL APPEARANCE: alert and no distress  HENT: mouth without ulcers or lesions, NG in place  RESP: lungs clear to auscultation - no rales, rhonchi or wheezes  CV: regular rhythm, normal rate, no rub, no murmur  EDEMA: 1+ LE and dependent edema bilaterally  ABDOMEN: soft, nondistended, mild TTP, bowel sounds normal  MS: extremities normal - no gross deformities noted, no evidence of inflammation in joints, no muscle tenderness  SKIN: no rash  DIALYSIS ACCESS:  Temporary catheter right internal jugular    Data   All labs reviewed by me.  CMP  Recent Labs   Lab 03/09/23  0446 03/09/23  0406 03/09/23  0134 03/09/23  0025 03/08/23  2142 03/08/23  0433 03/08/23  0424 03/07/23  1801 03/07/23  1149 03/07/23  0818 03/07/23  0421 03/06/23  0349 03/06/23  0347 03/03/23  0359 03/03/23  0354   NA  --   --  135*  --   --   --  133*  --   --   --  136  --  136   < > 137   POTASSIUM  --   --  4.0  --   --   --  3.9  --   --   --  3.8  --  3.2*   < > 4.7   CHLORIDE  --   --  99  --   --   --  98  --   --   --  100  --  100   < > 105   CO2  --   --  27  --   --   --  26  --   --   --  27  --  24   < > 24   ANIONGAP  --   --  9  --   --   --  9  --   --   --  9  --  12   < > 8   GLC  --  231* 211* 206* 284*   < > 211*   < > 261*   < > 151*   < > 119*   < > 144*   BUN  --   --  31.8*  --   --   --  67.2*  --   --   --  48.9*  --  96.0*   < > 36.5*   CR  --   --  1.12  --   --   --  1.35*  --   --   --  1.16  --  2.40*   < > 1.33*   GFRESTIMATED  --   --  >90  --   --   --  73  --   --   --  87  --  37*   < > 74   SARTHAK  --   --  8.0*  --   --   --  8.9  --   --   --  8.8  --  9.0   < > 8.6   MAG  --   --   1.6*  --   --   --  1.9  --  1.9  --   --   --   --   --  2.4*   PHOS 2.6  --  2.2*  --   --   --  2.9  --   --   --  2.2*  --  4.5   < > 5.2*   PROTTOTAL 5.2*  --  5.1*  --   --   --  4.9*  --   --   --  5.0*  --  4.5*   < > 5.0*   ALBUMIN 3.2*  --  3.1*  --   --   --  3.0*  --   --   --  3.2*  --  2.8*   < > 3.3*   BILITOTAL 3.4*  --  3.3*  --   --   --  2.5*  --   --   --  2.5*  --  1.5*   < > 2.6*   ALKPHOS 294*  --  277*  --   --   --  249*  --   --   --  180*  --  130*   < > 84   AST 33  --  33  --   --   --  37  --   --   --  52*  --  46   < > 262*   ALT 90*  --  92*  --   --   --  116*  --   --   --  155*  --  181*   < > 546*    < > = values in this interval not displayed.     CBC  Recent Labs   Lab 03/09/23 0134 03/08/23 0424 03/07/23 0421 03/06/23 0347   HGB 7.8* 8.5* 9.0* 8.4*   WBC 7.6 7.9 10.2 8.0   RBC 2.47* 2.69* 2.88* 2.69*   HCT 24.7* 26.4* 28.1* 26.5*    98 98 99   MCH 31.6 31.6 31.3 31.2   MCHC 31.6 32.2 32.0 31.7   RDW 20.3* 20.1* 19.9* 19.7*    138* 136* 90*     INR  Recent Labs   Lab 03/09/23  0134 03/08/23  0424 03/07/23  0421 03/06/23  0347 03/05/23  0354 03/04/23  2002   INR 1.19* 1.15 1.10 1.25* 1.27* 1.32*   PTT  --   --  30 24 27 25     ABG  Recent Labs   Lab 03/06/23  0003 03/05/23  1036 03/05/23  0740 03/05/23  0354 03/04/23  1155 03/04/23  0944 03/04/23  0744   PH  --   --   --   --   --  7.32* 7.30*   PCO2  --   --   --   --   --  56* 61*   PO2  --   --   --   --   --  83 61*   HCO3  --   --   --   --   --  29* 29*   O2PER 21 21 21 30   < > 30 2    < > = values in this interval not displayed.      Urine Studies  Recent Labs   Lab Test 02/27/23  1616 02/24/23  1818 02/22/23  1421 02/19/23  2051   COLOR Yellow Light Yellow Yellow Yellow   APPEARANCE Clear Clear Clear Clear   URINEGLC Negative Negative Negative Negative   URINEBILI Negative Negative Negative Negative   URINEKETONE Negative Negative Negative Negative   SG 1.012 1.009 1.010 1.015   UBLD Negative Negative  Negative Negative   URINEPH 5.5 5.0 5.0 5.0   PROTEIN 30* Negative Negative 30*   NITRITE Negative Negative Negative Negative   LEUKEST Negative Negative Negative Negative   RBCU <1 <1 1 26*   WBCU 2 1 2 16*     No lab results found.  PTH  No lab results found.  Iron Studies  Recent Labs   Lab Test 02/22/23  0610 02/20/23  0730 02/16/23  0543 12/20/22  0703 10/06/22  0958 04/07/22  0624   IRON 26*  --   --  249*  --  85   *  --   --   --   --   --    IRONSAT 19  --   --   --   --   --    ASCENCION 1,465* 1,335* 1,725* 2,207* 2,042* 423*       IMAGING:  All imaging studies reviewed by me.

## 2023-03-09 NOTE — PLAN OF CARE
/73 (BP Location: Left arm)   Pulse 105   Temp 99.1  F (37.3  C) (Oral)   Resp 16   Wt 72.3 kg (159 lb 6.4 oz)   SpO2 97%   BMI 26.53 kg/m   AVSS on RA. Patient continues to have incisional pain. Prn Oxycodone 2.5 mg x 2.Patient denies nausea. BG low 200's. Urine Output - incontinent of urine x 1 and voided in the toilet x 1. Bowel Function - incontinent of loose stool x 2. Had loose stool on toilet x 1.  Nutrition - continuous TF @ 50 ml hr. Drains - bagged old JUANA sites x 2. Activity - 2 assist with walker. Slept well in between cares. Mom in room.

## 2023-03-09 NOTE — PROGRESS NOTES
CLINICAL NUTRITION SERVICES - REASSESSMENT NOTE     Nutrition Prescription    RECOMMENDATIONS FOR MDs/PROVIDERS TO ORDER:  None currently     Malnutrition Status:    Moderate malnutrition in the context of acute on chronic illness    Recommendations already ordered by Registered Dietitian (RD):    Pt on calorie counts 3/8-3/10    BID (vanilla only) Ensure  Enlive  contains 350 kcal, 20 grams protein, 44 grams carbohydrate, 11 grams fat, 3 grams fiber per 8 fl oz.     Discontinued Special K bar per patient preference    Cycle TF: Osmolite 1.5 @ 65 ml/hr x 18 hours (suggest running from 6056-4510) + 2 pkts Prosource TF 20 provides 1170 mL daily, 1915 kcal, 114 g protein, 892 mL free H20, 0 g fiber    Post transplant diet education: high protein, food safety, and common post transplant nutrition considerations. Handouts left in room. Will follow up to discuss post transplant diet.     Future/Additional Recommendations:    Monitor for PO     Monitor for desire/ability to further cycle TF     Review post transplant diet education      EVALUATION OF THE PROGRESS TOWARD GOALS   Diet: Regular + Ensure Enlive BID + Special K bar daily     Nutrition Support: access: NDT placed 3/02    Formula/schedule/modulars: Osmolite 1.5 Tej @ goal of  50ml/hr  (1200ml/day) + 2 pkts prosource TF20 will provide: 1960 kcals, 115 g PRO, 914 ml free H20, 244 g CHO, and 0 g fiber daily.   TID Banatrol     Free water flushes: 30 mL Q4H    Intake/Tolerance: Inadequate TF infusions per I/O. Pt on calorie counts, no results yet. Taking 0% or bites only recently per I/O.      NEW FINDINGS   POD#9 liver transplant. Pt transferred out of ICU. SLP ok'd pt for regular diet 3/09.   Multiple visits with pt cut short today then pt gone to dialysis for the afternoon. Able to discuss cycling TF and working on PO. Left handouts about post transplant diet, encouraged patient to review, will follow up to discuss post transplant diet recommendations.      GI:    Last BM: 03/08/23    Diarrhea, c dif negative 3/05    Banatrol TID    stool softeners held      Weight:    Most Recent Weight: 72.3 kg (159 lb 6.4 oz)  on 3/9/23 via Bed scale    Body mass index is 26.53 kg/m .    Wt up 6.4 kg from admission. + 5.6 L from admission per I/O.     Meds:    Cholecalciferol 25 mcg daily    Novolog insulin Q4H    Lantus pen daily    Renal multivitamin    protonix    Prednisone    Tacrolimus    Ursodiol    Labs:   03/08/23 04:24 03/09/23 01:34 03/09/23 04:46   Sodium 133 (L) 135 (L)    Potassium 3.9 4.0    Urea Nitrogen 67.2 (H) 31.8 (H)    Creatinine 1.35 (H) 1.12    GFR Estimate 73 >90    Calcium 8.9 8.0 (L)    Magnesium 1.9 1.6 (L)    Phosphorus 2.9 2.2 (L) 2.6   Albumin 3.0 (L) 3.1 (L) 3.2 (L)   Protein Total 4.9 (L) 5.1 (L) 5.2 (L)   Alkaline Phosphatase 249 (H) 277 (H) 294 (H)    (H) 92 (H) 90 (H)   AST 37 33 33   Bilirubin Direct 1.79 (H)  2.68 (H)   Bilirubin Total 2.5 (H) 3.3 (H) 3.4 (H)   Glucose 211 (H) 211 (H)      Renal:    HD MWF, last run 3/08 with 4 L removed per EMR    MALNUTRITION  % Intake: Decreased intake does not meet criteria  % Weight Loss: Unable to assess - confounded by fluid  Subcutaneous Fat Loss: Upper arm:  severe and Lower arm:  severe   Muscle Loss: Thoracic region (clavicle, acromium bone, deltoid, trapezius, pectoral):  moderate, Upper arm (bicep, tricep):  moderate, Lower arm  (forearm):  moderate and Dorsal hand:  moderate  Fluid Accumulation/Edema: Mild  Malnutrition Diagnosis: Moderate malnutrition in the context of acute on chronic illness    Previous Goals   Total avg nutritional intake to meet a minimum of 30 kcal/kg and 1.5 g PRO/kg daily (per dosing wt 66 kg).  Evaluation: Not met but improving since last assessment    Previous Nutrition Diagnosis  Inadequate oral intake related to NPO with mechanical ventilation as evidenced by reliant on EN via NDT to meet 100% needs.    Evaluation: Improving    CURRENT NUTRITION  DIAGNOSIS  Inadequate oral intake related to low appetite as evidenced by patient consuming less than 50% of meals/snacks, reliant on TF to meet estimated nutrition needs.       INTERVENTIONS  Implementation  Enteral Nutrition - Modify schedule  Feeding tube flush  Medical food supplement therapy  Nutrition education for nutrition relationship to health/disease     Goals  Total avg nutritional intake to meet a minimum of 30 kcal/kg and 1.5 g PRO/kg daily (per dosing wt 66 kg).    Monitoring/Evaluation  Progress toward goals will be monitored and evaluated per protocol.      Josey Donahue RDN, LD  7A/Obs RD pager: [phone]  Weekend/Holiday RD pager: [phone]

## 2023-03-09 NOTE — PROGRESS NOTES
Transplant Surgery  Inpatient Daily Progress Note  2023    Assessment & Plan: Dillon Salter is a 27 year old male with a past medical history of Asperger's, DM2, and alcoholic cirrhosis c/b esophageal varices and hepatic encephalopathy. He is now s/p  donor liver transplant without stent on 23 with Dr. Sterling. Return to OR for washout and repair of arterial bleeding on 3/1/23.    s/p DDLT 23: POD #9. Patient with persistently elevated liver studies with Tb up to 3.4 from 2.5 and alkaline phosphatase 294 this morning with ALT elevated to 90. Abdominal US 3/7 with patent vasculature. Given this, plan for MRCP today to evaluate biliary anatomy.   - Continue ASA 325mg daily    Imaging:  -3/1 US: resolution of hematoma, patent vessels  -3/3 US: Patent, resolution of perihepatic hematoma with new perihepatic hematoma up to 9 cm.  -3/7 US with good flow.     Immunosuppression management:  Induction: Steroid taper and basiliximab x2 per renal sparing protocol.  Maintenance:   - mg BID --> reduced to 500mg BID in setting of diarrhea. Change to Myfortic 360 mg BID 3/8.   -Tacrolimus 2mg BID, goal level 8-10  -Prednisone 5mg daily    Neuro/Psych:  Fall: Mechanical fall with head trauma (but no LOC) on 39 AM in the setting of frailty and opioid use. CTH this AM without bleeding. Fall precautions.   Acute post op pain in the setting of chronic musculoskeletal + neuropathic pain pain: On oxycodone PTA for neuropathy. Previously had tried gabapentin without benefit Continue Oxycodone 2.5mg Q4 along with Tylenol 650mg Q8-change to PRN, minimize narcotic use due to sedation and fall this AM  * Pain consult placed for non-opioid management of chronic + neuropathic pain; no indication for chronic opioid use  MDD, Anxiety, Autism spectrum: Mother is caretaker, lives with parents. PTA fluoxetine  Acute delirium: possible ICU delirium with hallucinations. Continue to minimize narcotics as able.  Continue melatonin + seroquel at bedtime.    Hematology:   Anemia of chronic disease/Acute blood loss: Hgb stable ~7-9   Thrombocytopenia: PLT improving, >150    Cardiorespiratory:   Post op ventilatory support/hypercapnia/acute pulmonary edema: Extubated 3/3. Lethargic and hypercapnic 3/4 AM, improved with BiPAP. Now stable on room air.   -CXR 3/5: stable cardiomegaly, improved pulmonary edema   Hypovolemic/hemorrhagic shock: Secondary to bleeding. Resolved.   Tachycardia: Stable mild tachycardia    GI/Nutrition:   Severe malnutrition in the context of acute illness: Nutrition consulted. FT in place, receiving Novasource Renal, goal 40ml/hr. Regular diet.   Diarrhea: May be related to tube feeding vs cellcept. Cdiff neg, On Fiber TID.     Endocrine:   DM2; Steroid induced hyperglycemia: on insulin gtt --> Lantus 10 units daily, carb coverage and sliding scale insulin prn     Fluid/Electrolytes/Neph:   ASHISH: Present prior to transplant (prerenal-hypovolemia 2/2 blood loss) now exacerbated by liver transplant, shock. CRRT started 3/1/23. Tolerated iHD 3/4, will plan for MWF. Plan for UF today. Plan for tunneled line placement Friday with IR.    : Negron removed. Periodic bladder scans.    Prophylaxis: DVT (mechanical), fall, GI (PPI), fungal (Initially received fluconazole, changed to Micafungin d/t CRRT/re-operation), viral (Valcyte), pneumocystis (Bactrim -restarted 3/6), jalen-op (Zosyn)    Disposition: 7A    CHRISTA/Fellow/Resident Provider: Marliin Jones    Faculty: Thelma Sterling M.D.    __________________________________________________________________  Transplant History:    2/28/2023 (Liver), Postoperative day: 9     Interval History:  Overnight, patient got up from his bed to try to adjust the TV, fell and hit his head. No chest pain, palpitations or shortness of breath around the time of the fall. No neuro deficits. No loss of consciousness. Denies head or vision changes. No neurologic exam    Tolerating  regular diet + thin liquids. Denies dysuria or hematuria.    ROS:   A 10-point review of systems was negative except as noted above.    Curent Meds:    sodium chloride 0.9%  300 mL Hemodialysis Machine Once     sodium chloride (PF) 0.9%  10 mL Intracatheter During Dialysis/CRRT (from stock)     sodium chloride (PF) 0.9%  10 mL Intracatheter During Dialysis/CRRT (from stock)     albumin human  250 mL Intravenous Once in dialysis/CRRT     aspirin  325 mg Per Feeding Tube Daily     cholecalciferol  25 mcg Per Feeding Tube Daily     fiber modular  1 packet Per Feeding Tube TID     FLUoxetine  60 mg Oral At Bedtime     insulin aspart  1-6 Units Subcutaneous Q4H     [Held by provider] insulin aspart   Subcutaneous TID w/meals     [Held by provider] insulin aspart  1-7 Units Subcutaneous TID AC     [Held by provider] insulin aspart  1-5 Units Subcutaneous At Bedtime     insulin glargine  10 Units Subcutaneous Q24H     melatonin  5 mg Oral QPM     micafungin  100 mg Intravenous Q24H     multivitamin RENAL  1 tablet Oral Daily     mycophenolic acid  360 mg Oral BID IS     - MEDICATION INSTRUCTIONS -   Does not apply Once     OLANZapine zydis  5 mg Oral At Bedtime     pantoprazole  40 mg Per Feeding Tube BID     predniSONE  5 mg Oral Daily     protein modular  1 packet Per Feeding Tube BID     sodium chloride (PF)  3 mL Intravenous Q8H     sodium chloride (PF)  9 mL Intracatheter During Dialysis/CRRT (from stock)     sodium chloride (PF)  9 mL Intracatheter During Dialysis/CRRT (from stock)     sulfamethoxazole-trimethoprim  1 tablet Oral or Feeding Tube Once per day on Mon Wed Fri     tacrolimus  2 mg Oral BID IS    Or     tacrolimus  2 mg Oral or NG Tube BID IS     ursodiol  300 mg Oral BID     valGANciclovir  450 mg Oral Once per day on Mon Fri    Or     valGANciclovir  450 mg Oral or NG Tube Once per day on Mon Fri     cholecalciferol  25 mcg Oral or Feeding Tube Daily       Physical Exam:     Admit Weight: 65.9 kg (145  lb 4.5 oz)    Current Vitals:   BP (!) 142/76   Pulse 99   Temp 99.3  F (37.4  C) (Oral)   Resp 16   Wt 72.3 kg (159 lb 6.4 oz)   SpO2 98%   BMI 26.53 kg/m      Vital sign ranges:    Temp:  [97.5  F (36.4  C)-99.3  F (37.4  C)] 99.3  F (37.4  C)  Pulse:  [] 99  Resp:  [16-20] 16  BP: (114-148)/() 142/76  SpO2:  [97 %-100 %] 98 %    General Appearance: No acute distress  Skin: warm, dry  Heart: RRR, no murmurs  Lungs: Breathing comfortably on ambient  Abdomen: abdomen soft, non-distended. Incision c/d/i. Rash above incision site without overlying erythema or purulence  Extremities: edema: 1+ Mahendra edema bilaterally  Skin: No jaundice.   Neurologic: Awake, alert, oriented.     Data:   CMP  Recent Labs   Lab 03/09/23  0446 03/09/23  0406 03/09/23  0134 03/08/23  0433 03/08/23  0424 03/06/23  0349 03/06/23  0347 03/05/23  0358 03/05/23  0354   NA  --   --  135*  --  133*   < > 136  --  134*   POTASSIUM  --   --  4.0  --  3.9   < > 3.2*  --  3.7   CHLORIDE  --   --  99  --  98   < > 100  --  99   CO2  --   --  27  --  26   < > 24  --  23   GLC  --  231* 211*   < > 211*   < > 119*   < > 180*   BUN  --   --  31.8*  --  67.2*   < > 96.0*  --  73.5*   CR  --   --  1.12  --  1.35*   < > 2.40*  --  2.40*   GFRESTIMATED  --   --  >90  --  73   < > 37*  --  37*   SARTHAK  --   --  8.0*  --  8.9   < > 9.0  --  9.2   ICAW  --   --   --   --   --   --  5.2  --  5.1   MAG  --   --  1.6*  --  1.9   < >  --   --   --    PHOS 2.6  --  2.2*  --  2.9   < > 4.5  --  5.0*   ALBUMIN 3.2*  --  3.1*  --  3.0*   < > 2.8*  --  3.0*   BILITOTAL 3.4*  --  3.3*  --  2.5*   < > 1.5*  --  1.8*   ALKPHOS 294*  --  277*  --  249*   < > 130*  --  122   AST 33  --  33  --  37   < > 46  --  54*   ALT 90*  --  92*  --  116*   < > 181*  --  251*    < > = values in this interval not displayed.     CBC  Recent Labs   Lab 03/09/23  0134 03/08/23  0424   HGB 7.8* 8.5*   WBC 7.6 7.9    138*

## 2023-03-09 NOTE — PROGRESS NOTES
CLINICAL NUTRITION SERVICES - DISCHARGE NOTE    Patient s discharge needs assessed and discharge planning has been conducted with the multidisciplinary transplant care team including physicians, pharmacy, social work and transplant coordinator.    Follow up/Monitoring:  Once discharged, place outpatient nutrition consult via the transplant team if nutrition concerns arise.    Josey Donahue RDN, LD  7A/Obs RD pager: [phone]  Weekend/Holiday RD pager: [phone]

## 2023-03-09 NOTE — PROGRESS NOTES
HEMODIALYSIS TREATMENT NOTE    Date: 3/9/2023  Time: 4:26 PM    Data:  Pre Wt:  72.3 kg   Desired Wt: to be established   Post Wt: 69.0 kg  Weight change:3.3kg  Ultrafiltration - Post Run Net Total Removed (mL):3006  mL  Vascular Access Status: patent  Dialyzer Rinse:light,streaked  Total Blood Volume Processed:45.33   Liters  Total Dialysis (Treatment) Time:2 Hours    Lab:   No    Interventions:Scheduled HD through RT internal jugular CVC  for 2 hours UF goal 3.3 litres..C/O nausea at 1420.Prn Zofran 4 mg orally given.Rinsed back.Last /65 Pt C/O pain PRN oxycodone given orally.Hand off report given to PCROBER Carrington.      Assessment:  Pt A&O On room air oxygen.No c/o shortness of breath.     Plan:    Per Renal team

## 2023-03-09 NOTE — PLAN OF CARE
Goal Outcome Evaluation:    Overall Patient Progress: improvingOverall Patient Progress: improving    Temp: 98  F (36.7  C) Temp src: Oral BP: 138/82 Pulse: 98   Resp: 16 SpO2: 98 % O2 Device: None (Room air)      NEURO: A&Ox4, calm/cooperative, able to make needs known, mother in room, hx autism, head CT today following unwitnessed fall from standing - results showing no acute injury  RESPIRATORY: WNL on RA, lips/mouth dry/cracked  CARDIAC: WNL, denies dizziness, denies chest pain, lymph wraps BLE- removed by lymphedema RN this evening  GI/: Voids via toilet, incontinent of bowel and bladder, loose BMx1 today, oliguric- pt on HD, pt had dialysis today @1300 w/ 3.3L removed, NG infusing cycled TF@50mL/hr, pt refusing rate increase to 65mL/hr, TF to run 2pm-8am  SKIN: Dry, dry/scabbed rash surrounding clamshell incision, old JUANA sites x2 WNL, blanchable redness to sacrum/coccyx  DIET: Regular, will be NPO at midnight for MRCP tomorrow, BG checks q4hr   PAIN/NAUSEA: Intermittent nausea relieved w/ PRN oral zofran x2, moderate abd pain relieved w/ PRN oxycodone x2  IV ACCESS: PIV SL, 3-lumen CVC R-neck, R-IJ  ACTIVITY: Assist x 1-2 w/ walker/GB, pt steady w/ one staff member but often requests two staff present for comfort  LAB: Reviewed, liver labs elevated today, renal US today showing increased perinephric fluid collection  PLAN: Continue w/ POC, HD run completed today, pt & mom filling out MRI safety checklist, MRCP scheduled for tomorrow

## 2023-03-10 ENCOUNTER — APPOINTMENT (OUTPATIENT)
Dept: PHYSICAL THERAPY | Facility: CLINIC | Age: 28
DRG: 005 | End: 2023-03-10
Attending: STUDENT IN AN ORGANIZED HEALTH CARE EDUCATION/TRAINING PROGRAM
Payer: COMMERCIAL

## 2023-03-10 ENCOUNTER — APPOINTMENT (OUTPATIENT)
Dept: INTERVENTIONAL RADIOLOGY/VASCULAR | Facility: CLINIC | Age: 28
DRG: 005 | End: 2023-03-10
Attending: PHYSICIAN ASSISTANT
Payer: COMMERCIAL

## 2023-03-10 ENCOUNTER — APPOINTMENT (OUTPATIENT)
Dept: MRI IMAGING | Facility: CLINIC | Age: 28
DRG: 005 | End: 2023-03-10
Attending: PHYSICIAN ASSISTANT
Payer: COMMERCIAL

## 2023-03-10 LAB
ALBUMIN SERPL BCG-MCNC: 3.2 G/DL (ref 3.5–5.2)
ALP SERPL-CCNC: 322 U/L (ref 40–129)
ALT SERPL W P-5'-P-CCNC: 76 U/L (ref 10–50)
ANION GAP SERPL CALCULATED.3IONS-SCNC: 9 MMOL/L (ref 7–15)
AST SERPL W P-5'-P-CCNC: 26 U/L (ref 10–50)
BILIRUB DIRECT SERPL-MCNC: 3.08 MG/DL (ref 0–0.3)
BILIRUB SERPL-MCNC: 3.6 MG/DL
BUN SERPL-MCNC: 36.2 MG/DL (ref 6–20)
CALCIUM SERPL-MCNC: 9 MG/DL (ref 8.6–10)
CHLORIDE SERPL-SCNC: 96 MMOL/L (ref 98–107)
CREAT SERPL-MCNC: 1.75 MG/DL (ref 0.67–1.17)
DEPRECATED HCO3 PLAS-SCNC: 26 MMOL/L (ref 22–29)
ERYTHROCYTE [DISTWIDTH] IN BLOOD BY AUTOMATED COUNT: 20.6 % (ref 10–15)
GFR SERPL CREATININE-BSD FRML MDRD: 53 ML/MIN/1.73M2
GLUCOSE BLDC GLUCOMTR-MCNC: 132 MG/DL (ref 70–99)
GLUCOSE BLDC GLUCOMTR-MCNC: 192 MG/DL (ref 70–99)
GLUCOSE BLDC GLUCOMTR-MCNC: 217 MG/DL (ref 70–99)
GLUCOSE BLDC GLUCOMTR-MCNC: 246 MG/DL (ref 70–99)
GLUCOSE BLDC GLUCOMTR-MCNC: 264 MG/DL (ref 70–99)
GLUCOSE SERPL-MCNC: 191 MG/DL (ref 70–99)
HCT VFR BLD AUTO: 26.6 % (ref 40–53)
HGB BLD-MCNC: 8.2 G/DL (ref 13.3–17.7)
INR PPP: 1.11 (ref 0.85–1.15)
MAGNESIUM SERPL-MCNC: 1.8 MG/DL (ref 1.7–2.3)
MCH RBC QN AUTO: 31.4 PG (ref 26.5–33)
MCHC RBC AUTO-ENTMCNC: 30.8 G/DL (ref 31.5–36.5)
MCV RBC AUTO: 102 FL (ref 78–100)
PHOSPHATE SERPL-MCNC: 3.7 MG/DL (ref 2.5–4.5)
PLATELET # BLD AUTO: 167 10E3/UL (ref 150–450)
POTASSIUM SERPL-SCNC: 4.4 MMOL/L (ref 3.4–5.3)
PROT SERPL-MCNC: 5.4 G/DL (ref 6.4–8.3)
RBC # BLD AUTO: 2.61 10E6/UL (ref 4.4–5.9)
SODIUM SERPL-SCNC: 131 MMOL/L (ref 136–145)
TACROLIMUS BLD-MCNC: 4.6 UG/L (ref 5–15)
TME LAST DOSE: ABNORMAL H
TME LAST DOSE: ABNORMAL H
WBC # BLD AUTO: 6.1 10E3/UL (ref 4–11)

## 2023-03-10 PROCEDURE — 97530 THERAPEUTIC ACTIVITIES: CPT | Mod: GP

## 2023-03-10 PROCEDURE — 250N000012 HC RX MED GY IP 250 OP 636 PS 637: Performed by: SURGERY

## 2023-03-10 PROCEDURE — 250N000012 HC RX MED GY IP 250 OP 636 PS 637: Performed by: PHYSICIAN ASSISTANT

## 2023-03-10 PROCEDURE — 250N000013 HC RX MED GY IP 250 OP 250 PS 637: Performed by: PHYSICIAN ASSISTANT

## 2023-03-10 PROCEDURE — 36558 INSERT TUNNELED CV CATH: CPT

## 2023-03-10 PROCEDURE — 83735 ASSAY OF MAGNESIUM: CPT | Performed by: PHYSICIAN ASSISTANT

## 2023-03-10 PROCEDURE — P9045 ALBUMIN (HUMAN), 5%, 250 ML: HCPCS

## 2023-03-10 PROCEDURE — 255N000002 HC RX 255 OP 636: Performed by: SURGERY

## 2023-03-10 PROCEDURE — 258N000001 HC RX 258: Performed by: PHYSICIAN ASSISTANT

## 2023-03-10 PROCEDURE — 250N000011 HC RX IP 250 OP 636: Performed by: RADIOLOGY

## 2023-03-10 PROCEDURE — 84100 ASSAY OF PHOSPHORUS: CPT | Performed by: PHYSICIAN ASSISTANT

## 2023-03-10 PROCEDURE — 272N000504 HC NEEDLE CR4

## 2023-03-10 PROCEDURE — 74183 MRI ABD W/O CNTR FLWD CNTR: CPT

## 2023-03-10 PROCEDURE — 250N000013 HC RX MED GY IP 250 OP 250 PS 637: Performed by: INTERNAL MEDICINE

## 2023-03-10 PROCEDURE — 85027 COMPLETE CBC AUTOMATED: CPT | Performed by: PHYSICIAN ASSISTANT

## 2023-03-10 PROCEDURE — C1769 GUIDE WIRE: HCPCS

## 2023-03-10 PROCEDURE — 99233 SBSQ HOSP IP/OBS HIGH 50: CPT | Mod: 24

## 2023-03-10 PROCEDURE — C1887 CATHETER, GUIDING: HCPCS

## 2023-03-10 PROCEDURE — 272N000602 HC WOUND GLUE CR1

## 2023-03-10 PROCEDURE — 80053 COMPREHEN METABOLIC PANEL: CPT | Performed by: PHYSICIAN ASSISTANT

## 2023-03-10 PROCEDURE — 0JH63XZ INSERTION OF TUNNELED VASCULAR ACCESS DEVICE INTO CHEST SUBCUTANEOUS TISSUE AND FASCIA, PERCUTANEOUS APPROACH: ICD-10-PCS | Performed by: INTERNAL MEDICINE

## 2023-03-10 PROCEDURE — 250N000011 HC RX IP 250 OP 636: Performed by: PHYSICIAN ASSISTANT

## 2023-03-10 PROCEDURE — 250N000013 HC RX MED GY IP 250 OP 250 PS 637: Performed by: STUDENT IN AN ORGANIZED HEALTH CARE EDUCATION/TRAINING PROGRAM

## 2023-03-10 PROCEDURE — 99152 MOD SED SAME PHYS/QHP 5/>YRS: CPT | Performed by: RADIOLOGY

## 2023-03-10 PROCEDURE — 258N000003 HC RX IP 258 OP 636

## 2023-03-10 PROCEDURE — 90937 HEMODIALYSIS REPEATED EVAL: CPT

## 2023-03-10 PROCEDURE — 250N000011 HC RX IP 250 OP 636

## 2023-03-10 PROCEDURE — 77001 FLUOROGUIDE FOR VEIN DEVICE: CPT | Mod: 26 | Performed by: RADIOLOGY

## 2023-03-10 PROCEDURE — 36415 COLL VENOUS BLD VENIPUNCTURE: CPT | Performed by: PHYSICIAN ASSISTANT

## 2023-03-10 PROCEDURE — 76937 US GUIDE VASCULAR ACCESS: CPT | Mod: 26 | Performed by: RADIOLOGY

## 2023-03-10 PROCEDURE — C1750 CATH, HEMODIALYSIS,LONG-TERM: HCPCS

## 2023-03-10 PROCEDURE — 250N000009 HC RX 250: Performed by: RADIOLOGY

## 2023-03-10 PROCEDURE — 99024 POSTOP FOLLOW-UP VISIT: CPT | Performed by: RADIOLOGY

## 2023-03-10 PROCEDURE — 120N000011 HC R&B TRANSPLANT UMMC

## 2023-03-10 PROCEDURE — 80197 ASSAY OF TACROLIMUS: CPT | Performed by: PHYSICIAN ASSISTANT

## 2023-03-10 PROCEDURE — 85610 PROTHROMBIN TIME: CPT | Performed by: PHYSICIAN ASSISTANT

## 2023-03-10 PROCEDURE — 82248 BILIRUBIN DIRECT: CPT | Performed by: PHYSICIAN ASSISTANT

## 2023-03-10 PROCEDURE — 258N000003 HC RX IP 258 OP 636: Performed by: PHYSICIAN ASSISTANT

## 2023-03-10 PROCEDURE — 74183 MRI ABD W/O CNTR FLWD CNTR: CPT | Mod: 26 | Performed by: RADIOLOGY

## 2023-03-10 PROCEDURE — 250N000011 HC RX IP 250 OP 636: Performed by: INTERNAL MEDICINE

## 2023-03-10 PROCEDURE — A9585 GADOBUTROL INJECTION: HCPCS | Performed by: SURGERY

## 2023-03-10 PROCEDURE — 36558 INSERT TUNNELED CV CATH: CPT | Mod: GC | Performed by: RADIOLOGY

## 2023-03-10 RX ORDER — SIMETHICONE 80 MG
80 TABLET,CHEWABLE ORAL ONCE
Status: COMPLETED | OUTPATIENT
Start: 2023-03-10 | End: 2023-03-10

## 2023-03-10 RX ORDER — FLUMAZENIL 0.1 MG/ML
0.2 INJECTION, SOLUTION INTRAVENOUS
Status: DISCONTINUED | OUTPATIENT
Start: 2023-03-10 | End: 2023-03-10 | Stop reason: HOSPADM

## 2023-03-10 RX ORDER — FENTANYL CITRATE 50 UG/ML
25-50 INJECTION, SOLUTION INTRAMUSCULAR; INTRAVENOUS EVERY 5 MIN PRN
Status: DISCONTINUED | OUTPATIENT
Start: 2023-03-10 | End: 2023-03-10 | Stop reason: HOSPADM

## 2023-03-10 RX ORDER — GADOBUTROL 604.72 MG/ML
7.5 INJECTION INTRAVENOUS ONCE
Status: COMPLETED | OUTPATIENT
Start: 2023-03-10 | End: 2023-03-10

## 2023-03-10 RX ORDER — CEFAZOLIN SODIUM 2 G/100ML
2 INJECTION, SOLUTION INTRAVENOUS
Status: DISCONTINUED | OUTPATIENT
Start: 2023-03-10 | End: 2023-03-10

## 2023-03-10 RX ORDER — NALOXONE HYDROCHLORIDE 0.4 MG/ML
0.2 INJECTION, SOLUTION INTRAMUSCULAR; INTRAVENOUS; SUBCUTANEOUS
Status: DISCONTINUED | OUTPATIENT
Start: 2023-03-10 | End: 2023-03-10 | Stop reason: HOSPADM

## 2023-03-10 RX ORDER — NALOXONE HYDROCHLORIDE 0.4 MG/ML
0.4 INJECTION, SOLUTION INTRAMUSCULAR; INTRAVENOUS; SUBCUTANEOUS
Status: DISCONTINUED | OUTPATIENT
Start: 2023-03-10 | End: 2023-03-10 | Stop reason: HOSPADM

## 2023-03-10 RX ORDER — TACROLIMUS 1 MG/1
2 CAPSULE, GELATIN COATED ORAL ONCE
Status: DISCONTINUED | OUTPATIENT
Start: 2023-03-10 | End: 2023-03-10

## 2023-03-10 RX ORDER — HEPARIN SODIUM 1000 [USP'U]/ML
3 INJECTION, SOLUTION INTRAVENOUS; SUBCUTANEOUS ONCE
Status: COMPLETED | OUTPATIENT
Start: 2023-03-10 | End: 2023-03-10

## 2023-03-10 RX ORDER — TACROLIMUS 1 MG/1
2 CAPSULE ORAL ONCE
Status: COMPLETED | OUTPATIENT
Start: 2023-03-10 | End: 2023-03-10

## 2023-03-10 RX ORDER — ALBUMIN (HUMAN) 12.5 G/50ML
50 SOLUTION INTRAVENOUS
Status: DISCONTINUED | OUTPATIENT
Start: 2023-03-10 | End: 2023-03-10

## 2023-03-10 RX ORDER — TACROLIMUS 1 MG/1
4 CAPSULE ORAL
Status: DISCONTINUED | OUTPATIENT
Start: 2023-03-10 | End: 2023-03-12

## 2023-03-10 RX ADMIN — HEPARIN SODIUM 2000 UNITS: 1000 INJECTION INTRAVENOUS; SUBCUTANEOUS at 09:58

## 2023-03-10 RX ADMIN — FLUOXETINE HYDROCHLORIDE 60 MG: 40 CAPSULE ORAL at 21:50

## 2023-03-10 RX ADMIN — SIMETHICONE CHEW TAB 80 MG 80 MG: 80 TABLET ORAL at 02:53

## 2023-03-10 RX ADMIN — CEFAZOLIN SODIUM 2 G: 2 INJECTION, SOLUTION INTRAVENOUS at 09:20

## 2023-03-10 RX ADMIN — TACROLIMUS 2 MG: 5 CAPSULE ORAL at 07:45

## 2023-03-10 RX ADMIN — DEXTROSE MONOHYDRATE 1000 ML: 100 INJECTION, SOLUTION INTRAVENOUS at 07:53

## 2023-03-10 RX ADMIN — OXYCODONE HYDROCHLORIDE 2.5 MG: 5 TABLET ORAL at 17:35

## 2023-03-10 RX ADMIN — Medication 5 MG: at 21:50

## 2023-03-10 RX ADMIN — HEPARIN SODIUM 2000 UNITS: 1000 INJECTION INTRAVENOUS; SUBCUTANEOUS at 21:10

## 2023-03-10 RX ADMIN — MYCOPHENOLIC ACID 360 MG: 360 TABLET, DELAYED RELEASE ORAL at 07:45

## 2023-03-10 RX ADMIN — ONDANSETRON 4 MG: 4 TABLET, ORALLY DISINTEGRATING ORAL at 02:53

## 2023-03-10 RX ADMIN — HEPARIN SODIUM 5000 UNITS: 5000 INJECTION, SOLUTION INTRAVENOUS; SUBCUTANEOUS at 02:53

## 2023-03-10 RX ADMIN — SODIUM CHLORIDE 300 ML: 9 INJECTION, SOLUTION INTRAVENOUS at 16:46

## 2023-03-10 RX ADMIN — OXYCODONE HYDROCHLORIDE 2.5 MG: 5 TABLET ORAL at 00:30

## 2023-03-10 RX ADMIN — Medication 40 MG: at 07:44

## 2023-03-10 RX ADMIN — ACETAMINOPHEN 650 MG: 325 TABLET ORAL at 04:14

## 2023-03-10 RX ADMIN — TACROLIMUS 2 MG: 1 CAPSULE ORAL at 13:12

## 2023-03-10 RX ADMIN — Medication 25 MCG: at 07:43

## 2023-03-10 RX ADMIN — OLANZAPINE 5 MG: 5 TABLET, ORALLY DISINTEGRATING ORAL at 21:50

## 2023-03-10 RX ADMIN — VALGANCICLOVIR 450 MG: 450 TABLET, FILM COATED ORAL at 18:41

## 2023-03-10 RX ADMIN — ACETAMINOPHEN 650 MG: 325 TABLET ORAL at 21:49

## 2023-03-10 RX ADMIN — OXYCODONE HYDROCHLORIDE 2.5 MG: 5 TABLET ORAL at 13:27

## 2023-03-10 RX ADMIN — HEPARIN SODIUM 5000 UNITS: 5000 INJECTION, SOLUTION INTRAVENOUS; SUBCUTANEOUS at 13:12

## 2023-03-10 RX ADMIN — ACETAMINOPHEN 650 MG: 325 TABLET ORAL at 16:59

## 2023-03-10 RX ADMIN — B-COMPLEX W/ C & FOLIC ACID TAB 1 MG 1 TABLET: 1 TAB at 07:48

## 2023-03-10 RX ADMIN — FENTANYL CITRATE 50 MCG: 50 INJECTION, SOLUTION INTRAMUSCULAR; INTRAVENOUS at 09:26

## 2023-03-10 RX ADMIN — Medication 40 MG: at 21:50

## 2023-03-10 RX ADMIN — Medication: at 16:46

## 2023-03-10 RX ADMIN — PREDNISONE 5 MG: 5 TABLET ORAL at 07:48

## 2023-03-10 RX ADMIN — OXYCODONE HYDROCHLORIDE 2.5 MG: 5 TABLET ORAL at 04:15

## 2023-03-10 RX ADMIN — MAGNESIUM OXIDE TAB 400 MG (241.3 MG ELEMENTAL MG) 400 MG: 400 (241.3 MG) TAB at 13:12

## 2023-03-10 RX ADMIN — URSODIOL 300 MG: 300 CAPSULE ORAL at 07:44

## 2023-03-10 RX ADMIN — Medication 2.5 MG: at 00:29

## 2023-03-10 RX ADMIN — OXYCODONE HYDROCHLORIDE 2.5 MG: 5 TABLET ORAL at 08:18

## 2023-03-10 RX ADMIN — TACROLIMUS 4 MG: 1 CAPSULE ORAL at 18:40

## 2023-03-10 RX ADMIN — URSODIOL 300 MG: 300 CAPSULE ORAL at 21:50

## 2023-03-10 RX ADMIN — MICAFUNGIN 100 MG: 10 INJECTION, POWDER, LYOPHILIZED, FOR SOLUTION INTRAVENOUS at 08:14

## 2023-03-10 RX ADMIN — GADOBUTROL 7.5 ML: 604.72 INJECTION INTRAVENOUS at 16:33

## 2023-03-10 RX ADMIN — HEPARIN SODIUM 2000 UNITS: 1000 INJECTION INTRAVENOUS; SUBCUTANEOUS at 09:56

## 2023-03-10 RX ADMIN — LIDOCAINE HYDROCHLORIDE 10 ML: 10 INJECTION, SOLUTION EPIDURAL; INFILTRATION; INTRACAUDAL; PERINEURAL at 09:48

## 2023-03-10 RX ADMIN — SULFAMETHOXAZOLE AND TRIMETHOPRIM 1 TABLET: 400; 80 TABLET ORAL at 22:09

## 2023-03-10 RX ADMIN — ASPIRIN 325 MG ORAL TABLET 325 MG: 325 PILL ORAL at 07:46

## 2023-03-10 RX ADMIN — OXYCODONE HYDROCHLORIDE 2.5 MG: 5 TABLET ORAL at 21:49

## 2023-03-10 RX ADMIN — Medication 25 MCG: at 07:48

## 2023-03-10 RX ADMIN — MIDAZOLAM 1 MG: 1 INJECTION INTRAMUSCULAR; INTRAVENOUS at 09:17

## 2023-03-10 RX ADMIN — ALBUMIN HUMAN 250 ML: 0.05 INJECTION, SOLUTION INTRAVENOUS at 16:45

## 2023-03-10 RX ADMIN — MYCOPHENOLIC ACID 360 MG: 360 TABLET, DELAYED RELEASE ORAL at 18:10

## 2023-03-10 RX ADMIN — ONDANSETRON 4 MG: 4 TABLET, ORALLY DISINTEGRATING ORAL at 17:36

## 2023-03-10 ASSESSMENT — ACTIVITIES OF DAILY LIVING (ADL)
ADLS_ACUITY_SCORE: 39

## 2023-03-10 NOTE — PROGRESS NOTES
[facility]   Transplant Nephrology Progress Note  Date of Admission:  2/11/2023  Today's Date: 03/10/2023    Recommendations:  - HD today with 3L UF.     Assessment & Plan   # ASHISH: Increased.  Unclear urine output with some unmeasured voids as part of watery stools., but likely anuric/oliguric.     - ASHISH likely secondary to hemorrhagic shock and hemodynamic changes following liver transplant, as well as mild HRS prior to transplant.  - HD Info  Access: Temporary Catheter right IJ, Days: MWF, Length: 4.0 hrs, EDW: 66.0 kg, Heparin: No, MARISEL: No, IV Iron: No, Vit D analog: No   - Baseline Creatinine: ~ 0.6-0.8   - Proteinuria: Normal (<0.2 grams)    # Liver Tx: ESLD secondary to alcoholic cirrhosis, s/p OLT 2/28/23.  Trend down in transaminases, but increased total bilirubin.  Followed by Transplant Surgery.    # Immunosuppression: Tacrolimus immediate release (goal 5-8), Mycophenolic acid (dose 360 mg every 12 hours) and Prednisone (dose 5 mg daily)   - Induction with Recent Transplant:  Per Liver Tx protocol.   - Changes: Not at this time; Management per Transplant Surgery.    # Infection Prophylaxis:   - PJP: Sulfa/TMP (Bactrim)  - CMV: Valganciclovir (Valcyte); CMV IgG Ab recipient and donor negative  - Thrush: Micafungin (Mycamine)  - Fungal: Micafungin (Mycamine)    # Blood Pressure: Borderline control;  Goal BP: < 140/90 (Hospitalization goal)   - Volume status: Mildly hypervolemic  EDW ~ 66.0 kg   - Changes: Not at this time, but will pull some volume with dialysis.    # Diabetes: Controlled (HbA1c <7%) Last HbA1c: 6.1%   - Management as per primary team.    # Anemia in Chronic Disease: Hgb: Stable, low      MARISEL: No   - Iron studies: Low iron saturation, but high ferritin (2/22/23)    # Mineral Bone Disorder:   - Vitamin D; level: Low (12/20/22)        On supplement: Yes  - Calcium; level: Normal        On supplement: No  - Phosphorus; level: Normal        On  binder: No    # Electrolytes:   - Potassium; level: Normal        On supplement: No  - Magnesium; level: Normal        On supplement: Yes, magnesium oxide 400 mg QD  - Bicarbonate; level: Normal        On supplement: No    # Malnutrition: Patient on tube feedings.    # Diarrhea: Some loose stools.  Now started on fiber.  C. diff negative 3/5.    # EBV IgG Ab Discordance (D+/R-): Will do surveillance EBV PCR qmonth until 12 months post transplant, then q3 months until 2 years post transplant.    # Aspergers/Depression/Hallucinations: Patient appears to be having less hallucinations per his mother.  Seen by Psychiatry and started on olanzapine, as well as continuing on fluoxetine.    # Transplant History:  Etiology of Organ Failure: Alcoholic cirrhosis  Tx: Liver Tx  Transplant: 2/28/2023 (Liver)  Donor Type:  Donor Class:   Crossmatch at time of Tx: negative  DSA at time of Tx: No   Significant changes in immunosuppression: Lower CNI goal due to ASHISH  Significant transplant-related complications: ASHISH    Recommendations were communicated to the primary team via this note.    Saeid Machado APRN CNP   Pager: [phone]    Physician Attestation     I saw and evaluated Dillon Salter as part of a shared APRN/PA visit.     I personally reviewed the vital signs, medications and labs.    I personally performed the substantive portion of the medical decision making for this visit - please see the CHRISTA's documentation for full details.    Key management decisions made by me and carried out under my direction: Plan HD today for clearance and volume removal.    Antonio Aguila MD  Date of Service (when I saw the patient): 03/10/23    Interval History   Pt lethargic after line placement today.  Mr. Salter's creatinine is 1.75 (03/10 0603); Increased  Unmeasured urine output.  Trend down in transaminases, but increased total bilirubin.  Other significant labs/tests/vitals: Stable electrolytes.   No new events  overnight.  Tunneled HD line placed today.  MRCP done (Results pending)  Leg edema noted.    Review of Systems   4 point ROS was obtained and negative except as noted in the Interval History.    MEDICATIONS:    aspirin  325 mg Per Feeding Tube Daily     cholecalciferol  25 mcg Per Feeding Tube Daily     fiber modular  1 packet Per Feeding Tube TID     FLUoxetine  60 mg Oral At Bedtime     sodium chloride (PF) 0.9%  1.3-2.6 mL Intracatheter Once    Followed by     heparin  3 mL Intracatheter Once     sodium chloride (PF) 0.9%  1.3-2.6 mL Intracatheter Once    Followed by     heparin  3 mL Intracatheter Once     sodium chloride (PF) 0.9%  10 mL Intracatheter Once in dialysis/CRRT    Followed by     heparin  1.3-2.6 mL Intracatheter Once in dialysis/CRRT     sodium chloride (PF) 0.9%  10 mL Intracatheter Once in dialysis/CRRT    Followed by     heparin  1.3-2.6 mL Intracatheter Once in dialysis/CRRT     heparin ANTICOAGULANT  5,000 Units Subcutaneous Q12H     insulin aspart   Subcutaneous TID w/meals     insulin aspart  1-7 Units Subcutaneous TID AC     insulin aspart  1-5 Units Subcutaneous At Bedtime     insulin glargine  12 Units Subcutaneous Q24H     magnesium oxide  400 mg Oral Q24H     melatonin  5 mg Oral QPM     micafungin  100 mg Intravenous Q24H     multivitamin RENAL  1 tablet Oral Daily     mycophenolic acid  360 mg Oral BID IS     OLANZapine zydis  5 mg Oral At Bedtime     pantoprazole  40 mg Per Feeding Tube BID     predniSONE  5 mg Oral Daily     protein modular  1 packet Per Feeding Tube BID     sodium chloride (PF)  3 mL Intravenous Q8H     sodium chloride (PF)  9 mL Intracatheter During Dialysis/CRRT (from stock)     sodium chloride (PF)  9 mL Intracatheter During Dialysis/CRRT (from stock)     sulfamethoxazole-trimethoprim  1 tablet Oral or Feeding Tube Once per day on Mon Wed Fri     tacrolimus  4 mg Oral BID IS     ursodiol  300 mg Oral BID     valGANciclovir  450 mg Oral Once per day on Mon Fri      cholecalciferol  25 mcg Oral or Feeding Tube Daily       dextrose Stopped (23 1544)     dextrose 1,000 mL (03/10/23 0753)       Physical Exam   Temp  Av.6  F (36.4  C)  Min: 92.8  F (33.8  C)  Max: 99.5  F (37.5  C)  Arterial Line BP  Min: 78/56  Max: 173/78  Arterial Line MAP (mmHg)  Av.4 mmHg  Min: 56 mmHg  Max: 190 mmHg      Pulse  Av.4  Min: 62  Max: 112 Resp  Avg: 15.7  Min: 8  Max: 34  FiO2 (%)  Av.5 %  Min: 30 %  Max: 100 %  SpO2  Av.6 %  Min: 84 %  Max: 100 %    CVP (mmHg): 8 mmHgBP 137/69 (BP Location: Left arm)   Pulse 102   Temp 98  F (36.7  C) (Oral)   Resp 16   Wt 72.3 kg (159 lb 6.4 oz)   SpO2 100%   BMI 26.53 kg/m     Date 23 07 - 23 0659   Shift 8810-4044 5450-9173 1339-7277 24 Hour Total   INTAKE   I.V. 156   156   NG/GT 80   80   Enteral 160   160   Shift Total(mL/kg) 396(5.04)   396(5.04)   OUTPUT   Shift Total(mL/kg)       Weight (kg) 78.5 78.5 78.5 78.5      Admit Weight: 65.9 kg (145 lb 4.5 oz)     GENERAL APPEARANCE: Lethargic.  HENT: mouth without ulcers or lesions, NG in place  RESP: lungs clear to auscultation - no rales, rhonchi or wheezes  CV: regular rhythm, normal rate, no rub, no murmur  EDEMA: 1+-2+ LE and dependent edema bilaterally  ABDOMEN: soft, nondistended, mild TTP, bowel sounds normal  MS: extremities normal - no gross deformities noted, no evidence of inflammation in joints, no muscle tenderness  SKIN: no rash  DIALYSIS ACCESS:  Right IJ tunneled catheter     Data   All labs reviewed by me.  CMP  Recent Labs   Lab 03/10/23  1150 03/10/23  0753 03/10/23  0603 03/10/23  0425 23  1008 23  0446 23  0406 23  0134 23  0433 23  0424 23  1801 23  1149 23  0818 23  042   NA  --   --  131*  --   --   --   --  135*  --  133*  --   --   --  136   POTASSIUM  --   --  4.4  --   --   --   --  4.0  --  3.9  --   --   --  3.8   CHLORIDE  --   --  96*  --   --   --   --  99  --   98  --   --   --  100   CO2  --   --  26  --   --   --   --  27  --  26  --   --   --  27   ANIONGAP  --   --  9  --   --   --   --  9  --  9  --   --   --  9   * 132* 191* 264*   < >  --    < > 211*   < > 211*   < > 261*   < > 151*   BUN  --   --  36.2*  --   --   --   --  31.8*  --  67.2*  --   --   --  48.9*   CR  --   --  1.75*  --   --   --   --  1.12  --  1.35*  --   --   --  1.16   GFRESTIMATED  --   --  53*  --   --   --   --  >90  --  73  --   --   --  87   SARTHAK  --   --  9.0  --   --   --   --  8.0*  --  8.9  --   --   --  8.8   MAG  --   --  1.8  --   --   --   --  1.6*  --  1.9  --  1.9  --   --    PHOS  --   --  3.7  --   --  2.6  --  2.2*  --  2.9  --   --   --  2.2*   PROTTOTAL  --   --  5.4*  --   --  5.2*  --  5.1*  --  4.9*  --   --   --  5.0*   ALBUMIN  --   --  3.2*  --   --  3.2*  --  3.1*  --  3.0*  --   --   --  3.2*   BILITOTAL  --   --  3.6*  --   --  3.4*  --  3.3*  --  2.5*  --   --   --  2.5*   ALKPHOS  --   --  322*  --   --  294*  --  277*  --  249*  --   --   --  180*   AST  --   --  26  --   --  33  --  33  --  37  --   --   --  52*   ALT  --   --  76*  --   --  90*  --  92*  --  116*  --   --   --  155*    < > = values in this interval not displayed.     CBC  Recent Labs   Lab 03/10/23  0603 03/09/23  0134 03/08/23  0424 03/07/23  0421   HGB 8.2* 7.8* 8.5* 9.0*   WBC 6.1 7.6 7.9 10.2   RBC 2.61* 2.47* 2.69* 2.88*   HCT 26.6* 24.7* 26.4* 28.1*   * 100 98 98   MCH 31.4 31.6 31.6 31.3   MCHC 30.8* 31.6 32.2 32.0   RDW 20.6* 20.3* 20.1* 19.9*    158 138* 136*     INR  Recent Labs   Lab 03/10/23  0603 03/09/23  0134 03/08/23  0424 03/07/23  0421 03/06/23  0347 03/05/23  0354 03/04/23 2002   INR 1.11 1.19* 1.15 1.10 1.25* 1.27* 1.32*   PTT  --   --   --  30 24 27 25     ABG  Recent Labs   Lab 03/06/23  0003 03/05/23  1036 03/05/23  0740 03/05/23  0354 03/04/23  1155 03/04/23  0944 03/04/23  0744   PH  --   --   --   --   --  7.32* 7.30*   PCO2  --   --   --   --   --   56* 61*   PO2  --   --   --   --   --  83 61*   HCO3  --   --   --   --   --  29* 29*   O2PER 21 21 21 30   < > 30 2    < > = values in this interval not displayed.      Urine Studies  Recent Labs   Lab Test 02/27/23  1616 02/24/23  1818 02/22/23  1421 02/19/23  2051   COLOR Yellow Light Yellow Yellow Yellow   APPEARANCE Clear Clear Clear Clear   URINEGLC Negative Negative Negative Negative   URINEBILI Negative Negative Negative Negative   URINEKETONE Negative Negative Negative Negative   SG 1.012 1.009 1.010 1.015   UBLD Negative Negative Negative Negative   URINEPH 5.5 5.0 5.0 5.0   PROTEIN 30* Negative Negative 30*   NITRITE Negative Negative Negative Negative   LEUKEST Negative Negative Negative Negative   RBCU <1 <1 1 26*   WBCU 2 1 2 16*     No lab results found.  PTH  No lab results found.  Iron Studies  Recent Labs   Lab Test 02/22/23  0610 02/20/23  0730 02/16/23  0543 12/20/22  0703 10/06/22  0958 04/07/22  0624   IRON 26*  --   --  249*  --  85   *  --   --   --   --   --    IRONSAT 19  --   --   --   --   --    ASCENCION 1,465* 1,335* 1,725* 2,207* 2,042* 423*       IMAGING:  All imaging studies reviewed by me.

## 2023-03-10 NOTE — PLAN OF CARE
/69 (BP Location: Left arm)   Pulse 102   Temp 98  F (36.7  C) (Oral)   Resp 16   Wt 72.3 kg (159 lb 6.4 oz)   SpO2 100%   BMI 26.53 kg/m      Shift: 1969-8425  Isolation Status: None  VS: VSS on RA, afebrile  Neuro: Aox4  Behaviors: Lethargic and sedated today  B, 192 today. Gave 2 units of insulin.  Labs: N/A  Respiratory: Diminished in bases  Cardiac: WDL  Pain/Nausea: C/o 8/10 pain in abdomen. No c/o of nausea  PRN: Oxy x2  Diet: NPO. TF should be advanced to 65mL/hr goal when cycle starts.  IV Access: RIJ, L PIV saline locked; Right Chest HD  Infusion(s): D10 infusing in internal jugular d/t TF being off  Lines/Drains: None  GI/: Oliguric  Skin: Clamshell incision on abdomen, stapled, CHERYL. Some scabbing on chest.  Mobility: Ax2 with walker  Events/Education: Right neck HD line removed and replaced in Right chest.   Plan: MRCP at 1530. Dialysis at 1630. Follow POC.

## 2023-03-10 NOTE — IR NOTE
Patient Name: Dillon Salter  Medical Record Number: [id number]  Today's Date: 3/10/2023    Procedure: tunneled CVC placement, non tunneled CVC removal  Proceduralist: Briana Alas and Luh    Procedure Start: 0930  Procedure end: 1000  Sedation medications administered: versed  1 Mg., fentanyl  50 Mcg.    Report given to: JUAN PABLO  RN    Other Notes: Pt arrived to IR room 5 from . Consent reviewed. Pt denies any questions or concerns regarding procedure. Pt positioned supine  and monitored per protocol. Pt tolerated procedure without any noted complications. Pt transferred back to . CVC lumens flushed with heparin, ready to use.

## 2023-03-10 NOTE — PROGRESS NOTES
Transplant Surgery  Inpatient Daily Progress Note  03/10/2023    Assessment & Plan: Dillon Salter is a 27 year old male with a past medical history of Asperger's, DM2, and alcoholic cirrhosis c/b esophageal varices and hepatic encephalopathy. He is now s/p  donor liver transplant without stent on 23 with Dr. Sterling. Return to OR for washout and repair of arterial bleeding on 3/1/23.    s/p DDLT 23: POD #10. Patient with persistently elevated liver studies with total bilirubin continuting to rise to 3.6 and alkaline phosphatase rising to 322. Abdominal US 3/7 with patent vasculature. Given this, plan for MRCP to evaluate biliary anatomy - ordered on 3/9, but still pending. If no findings on MRCP to explain the patient's rising alkaline phosphatase and bilirubin, will consider liver biopsy.   - Continue ASA 325mg daily  - Continue ursodiol 300mg BID    Imaging:  -3/1 US: resolution of hematoma, patent vessels  -3/3 US: Patent, resolution of perihepatic hematoma with new perihepatic hematoma up to 9 cm.  -3/7 US with good flow.     Immunosuppression management:  Induction: Steroid taper and basiliximab x2 per renal sparing protocol.  Maintenance:   - mg BID --> reduced to 500mg BID in setting of diarrhea. Change to Myfortic 360 mg BID 3/8.   -Tacrolimus: increased to 4mg BID given low level this AM; goal level 8-10  -Prednisone 5mg daily    Neuro/Psych:  Fall: Mechanical fall with head trauma (but no LOC) on 3/9 AM in the setting of frailty and opioid use. CTH this AM without bleeding. Fall precautions.   Acute post op pain in the setting of chronic musculoskeletal + neuropathic pain pain: On oxycodone PTA for neuropathy. Previously had tried gabapentin without benefit Continue Oxycodone 2.5mg Q4 along with Tylenol 650mg Q8-change to PRN, minimize narcotic use due to sedation and fall this AM  * Pain consult placed for non-opioid management of chronic + neuropathic pain; no indication for  chronic opioid use - pending consult.   MDD, Anxiety, Autism spectrum: PTA fluoxetine  Acute delirium: Post operatively had ICU delirium with hallucinations. Continue to minimize narcotics as able. Continue melatonin + seroquel at bedtime.    Hematology:   Anemia of chronic disease/Acute blood loss: Hgb stable ~7-9 without overt s/sx of blood loss.   Thrombocytopenia: PLT improving, >150    Cardiorespiratory:   Post op ventilatory support/hypercapnia/acute pulmonary edema: Extubated 3/3. Lethargic and hypercapnic 3/4 AM, improved with BiPAP. Now stable on ambient air.   Hypovolemic/hemorrhagic shock: Secondary to bleeding. Resolved.   Tachycardia: Stable mild tachycardia    GI/Nutrition:   Severe malnutrition in the context of acute illness: Nutrition consulted. FT in place, receiving Novasource Renal, goal 40ml/hr. Regular diet.   * Plan for continued calorie counts given not at goal at this time.   Diarrhea: May be related to tube feeding vs cellcept. Cdiff neg, On Fiber TID.     Endocrine:   DM2; Steroid induced hyperglycemia: on insulin gtt --> Lantus 10 units daily, carb coverage and sliding scale insulin prn     Fluid/Electrolytes/Neph:   ASHISH: Present prior to transplant (prerenal-hypovolemia 2/2 blood loss) now exacerbated by liver transplant, shock. CRRT started 3/1/23. Tolerated iHD 3/4, will plan for MWF. S/p UF on 3/9/23 with 3L out. Plan for tunneled line placement today with IR.    : Negron removed. Periodic bladder scans.    Prophylaxis: DVT (mechanical), fall, GI (PPI), fungal (Initially received fluconazole, changed to Micafungin d/t CRRT/re-operation), viral (Valcyte), pneumocystis (Bactrim -restarted 3/6), jalen-op (Zosyn)    Disposition: 7A    CHRISTA/Fellow/Resident Provider: Marilin Jones    Faculty: Thelma Sterling M.D.    __________________________________________________________________  Transplant History:    2/28/2023 (Liver), Postoperative day: 10     Interval History:  No acute events  overnight. No fevers or chill    His pain was managed overnight with oxycodone + tylenol. Minimal urination. Tolerated UF with 3L removed on 3/9/23. NJ tube in place for feeding + oral intake. Using a walker + gait belt for mobility.     ROS:   A 10-point review of systems was negative except as noted above.    Curent Meds:    aspirin  325 mg Per Feeding Tube Daily     cholecalciferol  25 mcg Per Feeding Tube Daily     fiber modular  1 packet Per Feeding Tube TID     FLUoxetine  60 mg Oral At Bedtime     heparin ANTICOAGULANT  5,000 Units Subcutaneous Q12H     insulin aspart   Subcutaneous TID w/meals     insulin aspart  1-7 Units Subcutaneous TID AC     insulin aspart  1-5 Units Subcutaneous At Bedtime     insulin glargine  12 Units Subcutaneous Q24H     magnesium oxide  400 mg Oral Q24H     melatonin  5 mg Oral QPM     micafungin  100 mg Intravenous Q24H     multivitamin RENAL  1 tablet Oral Daily     mycophenolic acid  360 mg Oral BID IS     OLANZapine zydis  5 mg Oral At Bedtime     pantoprazole  40 mg Per Feeding Tube BID     predniSONE  5 mg Oral Daily     protein modular  1 packet Per Feeding Tube BID     sodium chloride (PF)  3 mL Intravenous Q8H     sulfamethoxazole-trimethoprim  1 tablet Oral or Feeding Tube Once per day on Mon Wed Fri     tacrolimus  2 mg Oral Once     tacrolimus  4 mg Oral BID IS     ursodiol  300 mg Oral BID     valGANciclovir  450 mg Oral Once per day on Mon Fri     cholecalciferol  25 mcg Oral or Feeding Tube Daily       Physical Exam:     Admit Weight: 65.9 kg (145 lb 4.5 oz)    Current Vitals:   /74   Pulse 104   Temp 98.3  F (36.8  C) (Oral)   Resp 15   Wt 72.3 kg (159 lb 6.4 oz)   SpO2 100%   BMI 26.53 kg/m      Vital sign ranges:    Temp:  [98  F (36.7  C)-98.3  F (36.8  C)] 98.3  F (36.8  C)  Pulse:  [] 104  Resp:  [8-18] 15  BP: (112-146)/(65-89) 127/74  SpO2:  [97 %-100 %] 100 %    General Appearance: No acute distress  Skin: warm, dry  Heart: RRR, no  murmurs  Lungs: Breathing comfortably on ambient  Abdomen: abdomen soft, non-distended. Incision c/d/i. Rash above incision site without overlying erythema or purulence  Extremities: edema: 1+ Mahendra edema bilaterally  Skin: No jaundice.   Neurologic: Awake, alert, oriented.     Data:   CMP  Recent Labs   Lab 03/10/23  0753 03/10/23  0603 03/09/23  1008 03/09/23  0446 03/09/23  0406 03/09/23  0134 03/06/23  0349 03/06/23  0347 03/05/23  0358 03/05/23  0354   NA  --  131*  --   --   --  135*   < > 136  --  134*   POTASSIUM  --  4.4  --   --   --  4.0   < > 3.2*  --  3.7   CHLORIDE  --  96*  --   --   --  99   < > 100  --  99   CO2  --  26  --   --   --  27   < > 24  --  23   * 191*   < >  --    < > 211*   < > 119*   < > 180*   BUN  --  36.2*  --   --   --  31.8*   < > 96.0*  --  73.5*   CR  --  1.75*  --   --   --  1.12   < > 2.40*  --  2.40*   GFRESTIMATED  --  53*  --   --   --  >90   < > 37*  --  37*   SARTHAK  --  9.0  --   --   --  8.0*   < > 9.0  --  9.2   ICAW  --   --   --   --   --   --   --  5.2  --  5.1   MAG  --  1.8  --   --   --  1.6*   < >  --   --   --    PHOS  --  3.7  --  2.6  --  2.2*   < > 4.5  --  5.0*   ALBUMIN  --  3.2*  --  3.2*  --  3.1*   < > 2.8*  --  3.0*   BILITOTAL  --  3.6*  --  3.4*  --  3.3*   < > 1.5*  --  1.8*   ALKPHOS  --  322*  --  294*  --  277*   < > 130*  --  122   AST  --  26  --  33  --  33   < > 46  --  54*   ALT  --  76*  --  90*  --  92*   < > 181*  --  251*    < > = values in this interval not displayed.     CBC  Recent Labs   Lab 03/10/23  0603 03/09/23  0134   HGB 8.2* 7.8*   WBC 6.1 7.6    158

## 2023-03-10 NOTE — PROGRESS NOTES
/69 (BP Location: Left arm)   Pulse 102   Temp 98  F (36.7  C) (Oral)   Resp 16   Wt 72.3 kg (159 lb 6.4 oz)   SpO2 100%   BMI 26.53 kg/m      [id number]. Slightly hypertensive, OVSS on RA. Q4hr BG. Patient left for MRCP at 1530, current;y in dialysis for 4hr run. TF restarted at 65 mL/hr. Colostomy bags over JUANA sites removed today, gauze in place CDI. Med card and lab book up to date. Continue with plan of care and update team with any changes.

## 2023-03-10 NOTE — PLAN OF CARE
BP (!) 146/84 (BP Location: Right arm)   Pulse 101   Temp 98.3  F (36.8  C) (Oral)   Resp 16   Wt 72.3 kg (159 lb 6.4 oz)   SpO2 100%   BMI 26.53 kg/m      SHIFT: 2011-4884  NEURO: NA  VITALS: AVSS on RA.  BG:   Recent Labs   Lab 03/10/23  0603 03/10/23  0425 03/10/23  0041 03/09/23  2122 03/09/23  1558 03/09/23  1125   * 264* 246* 264* 260* 294*     DIET: NPO.  MIVF/GTT/ABX: NA  PAIN: Managed with oxycodone 2.5 mg q4h and tylenol q8h  : Oliguric due to hemodialysis. Last dialyzed 03/09 w/ approx. 3L off.  GI: 2 Loose BMs during this shift.   TUBES: Nasoduodenal tube, R hemodialysis line in neck, and R TL IJ. Ostomy bag over old JUANA site with no noticeable drainage this shift.  ASSIST: Ao1 and a walker + gait belt.  SAFETY: Bed alarm on due to unwitnessed fall on 03/09.   SKIN: Abdominal incision stapled and CHERYL. Old JUANA site CDI. Generalized bruising.  PSYCH/SOC: Discharge Plans in Progress: [facility] TCU/[facility] are following patient. Barriers to d/c plan: medical stability, CRRT  LABS: Alk Phos up to 294 and trending upward.  PLAN: MRCP and Tunneled HD line placement are both planned for tomorrow.  DISCHARGE PLAN: Not planned yet.

## 2023-03-10 NOTE — PRE-PROCEDURE
GENERAL PRE-PROCEDURE:   Procedure:  Tunnelled line placement  Date/Time:  3/10/2023 8:46 AM    Verbal consent obtained?: Yes    Written consent obtained?: Yes    Risks and benefits: Risks, benefits and alternatives were discussed    Consent given by:  Patient  Patient states understanding of procedure being performed: Yes    Patient's understanding of procedure matches consent: Yes    Procedure consent matches procedure scheduled: Yes    Expected level of sedation:  Moderate  Appropriately NPO:  Yes  Mallampati  :  Grade 3- soft palate visible, posterior pharyngeal wall not visible  Lungs:  Lungs clear with good breath sounds bilaterally  Heart:  Normal heart sounds and rate  History & Physical reviewed:  History and physical reviewed and no updates needed  Statement of review:  I have reviewed the lab findings, diagnostic data, medications, and the plan for sedation

## 2023-03-10 NOTE — PROGRESS NOTES
Transplant Social Work Services Progress Note      Date of Initial Social Work Evaluation: 2022  Collaborated with:  [facility] admissions    Data: Dillon underwent a  donor liver transplant on 23.   Intervention: Collaborated with  [facility] admissions.  Assessment: No new assessment.   Education provided by SW: N/A  Plan:    Discharge Plans in Progress: [facility] following [facility].    Barriers to d/c plan: Medical stability and CRRT. Of note, [facility] at [facility].     Follow up Plan: Sw to follow up with  ARU next week to inquire about bed availability.     Zuleika Weems, HARLAN  SOT/BMT/CF Float

## 2023-03-10 NOTE — PROCEDURES
[facility]    Procedure: IR Procedure Note    Date/Time: 3/10/2023 9:59 AM  Performed by: Joel Arvizu MD  Authorized by: Joel Arvizu MD       UNIVERSAL PROTOCOL   Site Marked: NA  Prior Images Obtained and Reviewed:  Yes  Required items: Required blood products, implants, devices and special equipment available    Patient identity confirmed:  Verbally with patient, arm band, provided demographic data and hospital-assigned identification number  Patient was reevaluated immediately before administering moderate or deep sedation or anesthesia  Confirmation Checklist:  Patient's identity using two indicators, relevant allergies, procedure was appropriate and matched the consent or emergent situation and correct equipment/implants were available  Time out: Immediately prior to the procedure a time out was called    Universal Protocol: the Joint Commission Universal Protocol was followed    Preparation: Patient was prepped and draped in usual sterile fashion       ANESTHESIA    Anesthesia: Local infiltration  Local Anesthetic:  Lidocaine 1% without epinephrine      SEDATION  Patient Sedated: Yes    Sedation:  Fentanyl and midazolam  Vital signs: Vital signs monitored during sedation    See dictated procedure note for full details.  Findings: RIJ dialysis Tunnelled line placed, ready for use.    Specimens: none    Complications: None    Condition: Stable    Plan: RIJ dialysis Tunnelled line placed, ready for use.      PROCEDURE  Describe Procedure: RIJ dialysis Tunnelled line placed, ready for use.  Patient Tolerance:  Patient tolerated the procedure well with no immediate complications  Length of time physician/provider present for 1:1 monitoring during sedation: 30

## 2023-03-10 NOTE — PROGRESS NOTES
Calorie Count  Intake recorded for: 3/9  Total Kcals: 0 Total Protein: 0g  Kcals from Hospital Food: 0   Protein: 0g  Kcals from Outside Food (average):0 Protein: 0g  # Meals Ordered from Kitchen: 3 meals   # Meals Recorded: no intake recorded.   # Supplements Recorded: no intake recorded.

## 2023-03-10 NOTE — PROVIDER NOTIFICATION
Paged MD Blankenship regarding pt's reported abdominal discomfort and about having an order placed for simethicone.    One time order was placed.

## 2023-03-10 NOTE — PROGRESS NOTES
SPIRITUAL HEALTH SERVICES Progress Note  [facility] ([place] 7A    I attempted a length of stay ( self-initiated) visit but patient was getting an MRI. Patient's family was in the room and asked for a Jainism prayer book and rosary for patient, which I provided. Family affirmed patient would appreciate a  visit and I will check back early next week.     Calixto Deleon MDiv  Chaplain Resident  Pager [phone]      * [facility] remains available 24/7 for emergent requests/referrals, either by having the switchboard page the on-call  or by entering an ASAP/STAT consult in Epic (this will also page the on-call ). Routine Epic consults receive an initial response within 24 hours.*

## 2023-03-11 ENCOUNTER — APPOINTMENT (OUTPATIENT)
Dept: OCCUPATIONAL THERAPY | Facility: CLINIC | Age: 28
DRG: 005 | End: 2023-03-11
Attending: STUDENT IN AN ORGANIZED HEALTH CARE EDUCATION/TRAINING PROGRAM
Payer: COMMERCIAL

## 2023-03-11 LAB
ALBUMIN SERPL BCG-MCNC: 3.3 G/DL (ref 3.5–5.2)
ALP SERPL-CCNC: 325 U/L (ref 40–129)
ALT SERPL W P-5'-P-CCNC: 48 U/L (ref 10–50)
ANION GAP SERPL CALCULATED.3IONS-SCNC: 9 MMOL/L (ref 7–15)
AST SERPL W P-5'-P-CCNC: 22 U/L (ref 10–50)
BILIRUB DIRECT SERPL-MCNC: 2.03 MG/DL (ref 0–0.3)
BILIRUB SERPL-MCNC: 2.6 MG/DL
BUN SERPL-MCNC: 30.2 MG/DL (ref 6–20)
CALCIUM SERPL-MCNC: 8.6 MG/DL (ref 8.6–10)
CHLORIDE SERPL-SCNC: 94 MMOL/L (ref 98–107)
CREAT SERPL-MCNC: 1.65 MG/DL (ref 0.67–1.17)
DEPRECATED HCO3 PLAS-SCNC: 25 MMOL/L (ref 22–29)
ERYTHROCYTE [DISTWIDTH] IN BLOOD BY AUTOMATED COUNT: 20.8 % (ref 10–15)
GFR SERPL CREATININE-BSD FRML MDRD: 57 ML/MIN/1.73M2
GLUCOSE BLDC GLUCOMTR-MCNC: 294 MG/DL (ref 70–99)
GLUCOSE BLDC GLUCOMTR-MCNC: 296 MG/DL (ref 70–99)
GLUCOSE BLDC GLUCOMTR-MCNC: 338 MG/DL (ref 70–99)
GLUCOSE BLDC GLUCOMTR-MCNC: 340 MG/DL (ref 70–99)
GLUCOSE BLDC GLUCOMTR-MCNC: 352 MG/DL (ref 70–99)
GLUCOSE BLDC GLUCOMTR-MCNC: 438 MG/DL (ref 70–99)
GLUCOSE BLDC GLUCOMTR-MCNC: 445 MG/DL (ref 70–99)
GLUCOSE BLDC GLUCOMTR-MCNC: 464 MG/DL (ref 70–99)
GLUCOSE SERPL-MCNC: 373 MG/DL (ref 70–99)
HCT VFR BLD AUTO: 26.5 % (ref 40–53)
HGB BLD-MCNC: 8.5 G/DL (ref 13.3–17.7)
MAGNESIUM SERPL-MCNC: 2.1 MG/DL (ref 1.7–2.3)
MCH RBC QN AUTO: 32.3 PG (ref 26.5–33)
MCHC RBC AUTO-ENTMCNC: 32.1 G/DL (ref 31.5–36.5)
MCV RBC AUTO: 101 FL (ref 78–100)
PHOSPHATE SERPL-MCNC: 3.4 MG/DL (ref 2.5–4.5)
PLATELET # BLD AUTO: 200 10E3/UL (ref 150–450)
POTASSIUM SERPL-SCNC: 4.4 MMOL/L (ref 3.4–5.3)
PROT SERPL-MCNC: 5.6 G/DL (ref 6.4–8.3)
RBC # BLD AUTO: 2.63 10E6/UL (ref 4.4–5.9)
SODIUM SERPL-SCNC: 128 MMOL/L (ref 136–145)
WBC # BLD AUTO: 6.5 10E3/UL (ref 4–11)

## 2023-03-11 PROCEDURE — 85027 COMPLETE CBC AUTOMATED: CPT | Performed by: PHYSICIAN ASSISTANT

## 2023-03-11 PROCEDURE — 36592 COLLECT BLOOD FROM PICC: CPT | Performed by: PHYSICIAN ASSISTANT

## 2023-03-11 PROCEDURE — 250N000011 HC RX IP 250 OP 636: Performed by: PHYSICIAN ASSISTANT

## 2023-03-11 PROCEDURE — 258N000003 HC RX IP 258 OP 636: Performed by: PHYSICIAN ASSISTANT

## 2023-03-11 PROCEDURE — 250N000013 HC RX MED GY IP 250 OP 250 PS 637: Performed by: PHYSICIAN ASSISTANT

## 2023-03-11 PROCEDURE — 250N000012 HC RX MED GY IP 250 OP 636 PS 637: Performed by: PHYSICIAN ASSISTANT

## 2023-03-11 PROCEDURE — 97110 THERAPEUTIC EXERCISES: CPT | Mod: GO

## 2023-03-11 PROCEDURE — 250N000011 HC RX IP 250 OP 636: Performed by: INTERNAL MEDICINE

## 2023-03-11 PROCEDURE — 250N000013 HC RX MED GY IP 250 OP 250 PS 637: Performed by: INTERNAL MEDICINE

## 2023-03-11 PROCEDURE — 120N000011 HC R&B TRANSPLANT UMMC

## 2023-03-11 PROCEDURE — 250N000013 HC RX MED GY IP 250 OP 250 PS 637: Performed by: STUDENT IN AN ORGANIZED HEALTH CARE EDUCATION/TRAINING PROGRAM

## 2023-03-11 PROCEDURE — 83735 ASSAY OF MAGNESIUM: CPT | Performed by: PHYSICIAN ASSISTANT

## 2023-03-11 PROCEDURE — 82248 BILIRUBIN DIRECT: CPT | Performed by: PHYSICIAN ASSISTANT

## 2023-03-11 PROCEDURE — 97535 SELF CARE MNGMENT TRAINING: CPT | Mod: GO | Performed by: OCCUPATIONAL THERAPIST

## 2023-03-11 PROCEDURE — 80053 COMPREHEN METABOLIC PANEL: CPT | Performed by: PHYSICIAN ASSISTANT

## 2023-03-11 PROCEDURE — 84100 ASSAY OF PHOSPHORUS: CPT | Performed by: PHYSICIAN ASSISTANT

## 2023-03-11 PROCEDURE — 97535 SELF CARE MNGMENT TRAINING: CPT | Mod: GO

## 2023-03-11 RX ORDER — NICOTINE POLACRILEX 4 MG
15-30 LOZENGE BUCCAL
Status: DISCONTINUED | OUTPATIENT
Start: 2023-03-11 | End: 2023-03-19 | Stop reason: HOSPADM

## 2023-03-11 RX ORDER — DEXTROSE MONOHYDRATE 25 G/50ML
25-50 INJECTION, SOLUTION INTRAVENOUS
Status: DISCONTINUED | OUTPATIENT
Start: 2023-03-11 | End: 2023-03-19 | Stop reason: HOSPADM

## 2023-03-11 RX ORDER — DEXTROSE MONOHYDRATE 100 MG/ML
INJECTION, SOLUTION INTRAVENOUS CONTINUOUS PRN
Status: DISCONTINUED | OUTPATIENT
Start: 2023-03-11 | End: 2023-03-12

## 2023-03-11 RX ORDER — OXYCODONE HYDROCHLORIDE 5 MG/1
5 TABLET ORAL EVERY 4 HOURS PRN
Status: DISCONTINUED | OUTPATIENT
Start: 2023-03-11 | End: 2023-03-14

## 2023-03-11 RX ADMIN — MAGNESIUM OXIDE TAB 400 MG (241.3 MG ELEMENTAL MG) 400 MG: 400 (241.3 MG) TAB at 13:39

## 2023-03-11 RX ADMIN — TACROLIMUS 4 MG: 1 CAPSULE ORAL at 07:31

## 2023-03-11 RX ADMIN — Medication 40 MG: at 07:33

## 2023-03-11 RX ADMIN — URSODIOL 300 MG: 300 CAPSULE ORAL at 20:39

## 2023-03-11 RX ADMIN — B-COMPLEX W/ C & FOLIC ACID TAB 1 MG 1 TABLET: 1 TAB at 07:32

## 2023-03-11 RX ADMIN — Medication 25 MCG: at 10:40

## 2023-03-11 RX ADMIN — OXYCODONE HYDROCHLORIDE 5 MG: 5 TABLET ORAL at 22:09

## 2023-03-11 RX ADMIN — TACROLIMUS 4 MG: 1 CAPSULE ORAL at 17:42

## 2023-03-11 RX ADMIN — ACETAMINOPHEN 650 MG: 325 TABLET ORAL at 22:17

## 2023-03-11 RX ADMIN — URSODIOL 300 MG: 300 CAPSULE ORAL at 07:32

## 2023-03-11 RX ADMIN — HEPARIN SODIUM 5000 UNITS: 5000 INJECTION, SOLUTION INTRAVENOUS; SUBCUTANEOUS at 13:39

## 2023-03-11 RX ADMIN — OXYCODONE HYDROCHLORIDE 2.5 MG: 5 TABLET ORAL at 03:35

## 2023-03-11 RX ADMIN — Medication 5 MG: at 20:39

## 2023-03-11 RX ADMIN — OLANZAPINE 5 MG: 5 TABLET, ORALLY DISINTEGRATING ORAL at 22:09

## 2023-03-11 RX ADMIN — PREDNISONE 5 MG: 5 TABLET ORAL at 07:32

## 2023-03-11 RX ADMIN — Medication 40 MG: at 20:39

## 2023-03-11 RX ADMIN — ASPIRIN 325 MG ORAL TABLET 325 MG: 325 PILL ORAL at 07:32

## 2023-03-11 RX ADMIN — Medication 25 MCG: at 07:32

## 2023-03-11 RX ADMIN — OXYCODONE HYDROCHLORIDE 2.5 MG: 5 TABLET ORAL at 17:42

## 2023-03-11 RX ADMIN — FLUOXETINE HYDROCHLORIDE 60 MG: 40 CAPSULE ORAL at 22:09

## 2023-03-11 RX ADMIN — MYCOPHENOLIC ACID 360 MG: 360 TABLET, DELAYED RELEASE ORAL at 17:42

## 2023-03-11 RX ADMIN — ONDANSETRON 4 MG: 4 TABLET, ORALLY DISINTEGRATING ORAL at 22:17

## 2023-03-11 RX ADMIN — MICAFUNGIN 100 MG: 10 INJECTION, POWDER, LYOPHILIZED, FOR SOLUTION INTRAVENOUS at 07:49

## 2023-03-11 RX ADMIN — MYCOPHENOLIC ACID 360 MG: 360 TABLET, DELAYED RELEASE ORAL at 07:31

## 2023-03-11 RX ADMIN — HEPARIN SODIUM 5000 UNITS: 5000 INJECTION, SOLUTION INTRAVENOUS; SUBCUTANEOUS at 02:58

## 2023-03-11 RX ADMIN — ACETAMINOPHEN 650 MG: 325 TABLET ORAL at 06:09

## 2023-03-11 RX ADMIN — ONDANSETRON 4 MG: 4 TABLET, ORALLY DISINTEGRATING ORAL at 14:06

## 2023-03-11 ASSESSMENT — ACTIVITIES OF DAILY LIVING (ADL)
ADLS_ACUITY_SCORE: 39
ADLS_ACUITY_SCORE: 41
ADLS_ACUITY_SCORE: 41
ADLS_ACUITY_SCORE: 39
ADLS_ACUITY_SCORE: 41
ADLS_ACUITY_SCORE: 39
ADLS_ACUITY_SCORE: 39
ADLS_ACUITY_SCORE: 41
ADLS_ACUITY_SCORE: 41

## 2023-03-11 NOTE — PROVIDER NOTIFICATION
[id number], -2, Joie ANDRES, 1995  Pt's blood glucose is 352 asymptomatic. Pt is needing inhaler medication for anxiety wants to speak w/ MD about it it being prescribed due to anxiety being overwhelming Tequila Grewal [id number]

## 2023-03-11 NOTE — PROGRESS NOTES
[facility]   Transplant Nephrology Progress Note  Date of Admission:  2/11/2023  Today's Date: 03/11/2023    Recommendations:  - no indication for dialysis today, but will likely need tomorrow (3/12)    Assessment & Plan   # ASHISH: Increased.  Unclear urine output with some unmeasured voids as part of watery stools., but likely anuric/oliguric.     - ASHISH likely secondary to hemorrhagic shock and hemodynamic changes following liver transplant, as well as mild HRS prior to transplant.  - HD Info  Access: Temporary Catheter right IJ, Days: MWF, Length: 4.0 hrs, EDW: 66.0 kg, Heparin: No, MARISEL: No, IV Iron: No, Vit D analog: No   - Baseline Creatinine: ~ 0.6-0.8   - Proteinuria: Normal (<0.2 grams)    # Liver Tx: ESLD secondary to alcoholic cirrhosis, s/p OLT 2/28/23.  Trend down in transaminases, but increased total bilirubin.  Followed by Transplant Surgery.    # Immunosuppression: Tacrolimus immediate release (goal 5-8), Mycophenolic acid (dose 360 mg every 12 hours) and Prednisone (dose 5 mg daily)   - Induction with Recent Transplant:  Per Liver Tx protocol.   - Changes: Not at this time; Management per Transplant Surgery.    # Infection Prophylaxis:   - PJP: Sulfa/TMP (Bactrim)  - CMV: Valganciclovir (Valcyte); CMV IgG Ab recipient and donor negative  - Thrush: Micafungin (Mycamine)  - Fungal: Micafungin (Mycamine)    # Blood Pressure: Borderline control;  Goal BP: < 140/90 (Hospitalization goal)   - Volume status: Mildly hypervolemic  EDW ~ 66.0 kg   - Changes: Not at this time    # Diabetes: Controlled (HbA1c <7%) Last HbA1c: 6.1%   - Management as per primary team.    # Anemia in Chronic Disease: Hgb: Stable, low      MARISEL: No   - Iron studies: Low iron saturation, but high ferritin (2/22/23)    # Mineral Bone Disorder:   - Vitamin D; level: Low (12/20/22)        On supplement: Yes  - Calcium; level: Normal        On supplement: No  - Phosphorus; level: Normal        On  binder: No    # Electrolytes:   - Potassium; level: High        On supplement: No  - Magnesium; level: Normal        On supplement: Yes, magnesium oxide 400 mg QD  - Bicarbonate; level: Normal        On supplement: No    # Malnutrition: Patient on tube feedings.    # Diarrhea: Some loose stools.  Now started on fiber.  C. diff negative 3/5.    # EBV IgG Ab Discordance (D+/R-): Will do surveillance EBV PCR qmonth until 12 months post transplant, then q3 months until 2 years post transplant.    # Aspergers/Depression/Hallucinations: Patient appears to be having less hallucinations per his mother.  Seen by Psychiatry and started on olanzapine, as well as continuing on fluoxetine.    # Transplant History:  Etiology of Organ Failure: Alcoholic cirrhosis  Tx: Liver Tx  Transplant: 2/28/2023 (Liver)  Donor Type:  Donor Class:   Crossmatch at time of Tx: negative  DSA at time of Tx: No   Significant changes in immunosuppression: Lower CNI goal due to ASHISH  Significant transplant-related complications: ASHISH    Recommendations were communicated to the primary team via this note.    VAMSHI Echols CNP   Pager: [phone]         Interval History   Mr. Salter's creatinine is 1.65 (03/11 0651); Stable  Unmeasured urine output.  Normal transaminases, but increased total bilirubin.  Other significant labs/tests/vitals: Stable electrolytes.   No new events overnight.  Tunneled HD line placed today.  MRCP done 3/10 (Results pending)  Leg edema noted.     Review of Systems   4 point ROS was obtained and negative except as noted in the Interval History.    MEDICATIONS:    aspirin  325 mg Per Feeding Tube Daily     cholecalciferol  25 mcg Per Feeding Tube Daily     fiber modular  1 packet Per Feeding Tube TID     FLUoxetine  60 mg Oral At Bedtime     sodium chloride (PF) 0.9%  1.3-2.6 mL Intracatheter Once    Followed by     heparin  3 mL Intracatheter Once     sodium chloride (PF) 0.9%  1.3-2.6 mL Intracatheter Once    Followed  by     heparin  3 mL Intracatheter Once     heparin ANTICOAGULANT  5,000 Units Subcutaneous Q12H     insulin aspart   Subcutaneous TID w/meals     insulin aspart  1-7 Units Subcutaneous TID AC     insulin aspart  1-5 Units Subcutaneous At Bedtime     insulin glargine  12 Units Subcutaneous Q24H     magnesium oxide  400 mg Oral Q24H     melatonin  5 mg Oral QPM     micafungin  100 mg Intravenous Q24H     multivitamin RENAL  1 tablet Oral Daily     mycophenolic acid  360 mg Oral BID IS     OLANZapine zydis  5 mg Oral At Bedtime     pantoprazole  40 mg Per Feeding Tube BID     predniSONE  5 mg Oral Daily     protein modular  1 packet Per Feeding Tube BID     sodium chloride (PF)  3 mL Intravenous Q8H     sulfamethoxazole-trimethoprim  1 tablet Oral or Feeding Tube Once per day on      tacrolimus  4 mg Oral BID IS     ursodiol  300 mg Oral BID     valGANciclovir  450 mg Oral Once per day on      cholecalciferol  25 mcg Oral or Feeding Tube Daily       dextrose Stopped (23 1544)     dextrose 1,000 mL (03/10/23 0753)       Physical Exam   Temp  Av.6  F (36.4  C)  Min: 92.8  F (33.8  C)  Max: 99.5  F (37.5  C)  Arterial Line BP  Min: 78/56  Max: 173/78  Arterial Line MAP (mmHg)  Av.4 mmHg  Min: 56 mmHg  Max: 190 mmHg      Pulse  Av.4  Min: 62  Max: 112 Resp  Avg: 15.7  Min: 8  Max: 34  FiO2 (%)  Av.5 %  Min: 30 %  Max: 100 %  SpO2  Av.6 %  Min: 84 %  Max: 100 %    CVP (mmHg): 8 mmHgBP 137/80 (BP Location: Left arm)   Pulse 106   Temp 98.7  F (37.1  C) (Oral)   Resp 16   Wt 72.3 kg (159 lb 6.4 oz)   SpO2 100%   BMI 26.53 kg/m     Date 23 07 - 23 0659   Shift 5065-9179 4934-1683 0462-5635 24 Hour Total   INTAKE   I.V. 156   156   NG/GT 80   80   Enteral 160   160   Shift Total(mL/kg) 396(5.04)   396(5.04)   OUTPUT   Shift Total(mL/kg)       Weight (kg) 78.5 78.5 78.5 78.5      Admit Weight: 65.9 kg (145 lb 4.5 oz)     GENERAL APPEARANCE: Lethargic.  HENT:  mouth without ulcers or lesions, NG in place  RESP: lungs clear to auscultation - no rales, rhonchi or wheezes  CV: regular rhythm, normal rate, no rub, no murmur  EDEMA: 1+-2+ LE and dependent edema bilaterally  ABDOMEN: soft, nondistended, mild TTP, bowel sounds normal  MS: extremities normal - no gross deformities noted, no evidence of inflammation in joints, no muscle tenderness  SKIN: no rash  DIALYSIS ACCESS:  Right IJ tunneled catheter     Data   All labs reviewed by me.  CMP  Recent Labs   Lab 03/11/23  0559 03/11/23  0253 03/10/23  2254 03/10/23  1808 03/10/23  0753 03/10/23  0603 03/09/23  1008 03/09/23  0446 03/09/23  0406 03/09/23  0134 03/08/23  0433 03/08/23  0424 03/07/23  1801 03/07/23  1149 03/07/23  0818 03/07/23  0421   NA  --   --   --   --   --  131*  --   --   --  135*  --  133*  --   --   --  136   POTASSIUM  --   --   --   --   --  4.4  --   --   --  4.0  --  3.9  --   --   --  3.8   CHLORIDE  --   --   --   --   --  96*  --   --   --  99  --  98  --   --   --  100   CO2  --   --   --   --   --  26  --   --   --  27  --  26  --   --   --  27   ANIONGAP  --   --   --   --   --  9  --   --   --  9  --  9  --   --   --  9   * 294* 296* 217*   < > 191*   < >  --    < > 211*   < > 211*   < > 261*   < > 151*   BUN  --   --   --   --   --  36.2*  --   --   --  31.8*  --  67.2*  --   --   --  48.9*   CR  --   --   --   --   --  1.75*  --   --   --  1.12  --  1.35*  --   --   --  1.16   GFRESTIMATED  --   --   --   --   --  53*  --   --   --  >90  --  73  --   --   --  87   SARTHAK  --   --   --   --   --  9.0  --   --   --  8.0*  --  8.9  --   --   --  8.8   MAG  --   --   --   --   --  1.8  --   --   --  1.6*  --  1.9  --  1.9  --   --    PHOS  --   --   --   --   --  3.7  --  2.6  --  2.2*  --  2.9  --   --   --  2.2*   PROTTOTAL  --   --   --   --   --  5.4*  --  5.2*  --  5.1*  --  4.9*  --   --   --  5.0*   ALBUMIN  --   --   --   --   --  3.2*  --  3.2*  --  3.1*  --  3.0*  --   --   --   3.2*   BILITOTAL  --   --   --   --   --  3.6*  --  3.4*  --  3.3*  --  2.5*  --   --   --  2.5*   ALKPHOS  --   --   --   --   --  322*  --  294*  --  277*  --  249*  --   --   --  180*   AST  --   --   --   --   --  26  --  33  --  33  --  37  --   --   --  52*   ALT  --   --   --   --   --  76*  --  90*  --  92*  --  116*  --   --   --  155*    < > = values in this interval not displayed.     CBC  Recent Labs   Lab 03/10/23  0603 03/09/23  0134 03/08/23  0424 03/07/23  0421   HGB 8.2* 7.8* 8.5* 9.0*   WBC 6.1 7.6 7.9 10.2   RBC 2.61* 2.47* 2.69* 2.88*   HCT 26.6* 24.7* 26.4* 28.1*   * 100 98 98   MCH 31.4 31.6 31.6 31.3   MCHC 30.8* 31.6 32.2 32.0   RDW 20.6* 20.3* 20.1* 19.9*    158 138* 136*     INR  Recent Labs   Lab 03/10/23  0603 03/09/23  0134 03/08/23  0424 03/07/23  0421 03/06/23  0347 03/05/23  0354 03/04/23  2002   INR 1.11 1.19* 1.15 1.10 1.25* 1.27* 1.32*   PTT  --   --   --  30 24 27 25     ABG  Recent Labs   Lab 03/06/23  0003 03/05/23  1036 03/05/23  0740 03/05/23  0354 03/04/23  1155 03/04/23  0944 03/04/23 0744   PH  --   --   --   --   --  7.32* 7.30*   PCO2  --   --   --   --   --  56* 61*   PO2  --   --   --   --   --  83 61*   HCO3  --   --   --   --   --  29* 29*   O2PER 21 21 21 30   < > 30 2    < > = values in this interval not displayed.      Urine Studies  Recent Labs   Lab Test 02/27/23  1616 02/24/23  1818 02/22/23  1421 02/19/23  2051   COLOR Yellow Light Yellow Yellow Yellow   APPEARANCE Clear Clear Clear Clear   URINEGLC Negative Negative Negative Negative   URINEBILI Negative Negative Negative Negative   URINEKETONE Negative Negative Negative Negative   SG 1.012 1.009 1.010 1.015   UBLD Negative Negative Negative Negative   URINEPH 5.5 5.0 5.0 5.0   PROTEIN 30* Negative Negative 30*   NITRITE Negative Negative Negative Negative   LEUKEST Negative Negative Negative Negative   RBCU <1 <1 1 26*   WBCU 2 1 2 16*     No lab results found.  PTH  No lab results found.  Iron  Studies  Recent Labs   Lab Test 02/22/23  0610 02/20/23  0730 02/16/23  0543 12/20/22  0703 10/06/22  0958 04/07/22  0624   IRON 26*  --   --  249*  --  85   *  --   --   --   --   --    IRONSAT 19  --   --   --   --   --    ASCENCION 1,465* 1,335* 1,725* 2,207* 2,042* 423*       IMAGING:  All imaging studies reviewed by me.

## 2023-03-11 NOTE — PROVIDER NOTIFICATION
"Provider paged @5:35 \"\"  Provider called and said to give sliding scale and will follow up with team in the am. RN expressed that BG has been >300 all day.    Of note, pt takes 20 units of Lantus BID at home and is only on 18 units once a day here. Endocrine is not following  Evy Rico RN on 3/11/2023 at 5:43 PM    "

## 2023-03-11 NOTE — PROGRESS NOTES
Transplant Surgery  Inpatient Daily Progress Note  2023    Assessment & Plan: Dillon Salter is a 27 year old male with a past medical history of Asperger's, DM2, and alcoholic cirrhosis c/b esophageal varices and hepatic encephalopathy. He is now s/p  donor liver transplant without stent on 23 with Dr. Sterling. Return to OR for washout and repair of arterial bleeding on 3/1/23.    s/p DDLT 23: POD #11. Patient with elevated liver studies with total bilirubin 2.6 from 3.6 and alkaline phosphatase rising to 325. Abdominal US 3/7 with patent vasculature. Given this, MRCP to evaluate biliary anatomy as resulted below. Will discuss ERCP vs biopsy today.   - Continue ASA 325mg daily  - Continue ursodiol 300mg BID    Imaging:  -3/1 US: resolution of hematoma, patent vessels  -3/3 US: Patent, resolution of perihepatic hematoma with new perihepatic hematoma up to 9 cm.  -3/7 US with good flow.   -3/10 MRCP:   1. Liver transplant. Abrupt caliber change in the common bile duct  which may be related to anastomosis. There is narrowing of the common  hepatic duct and central intrahepatic ducts. ERCP may be necessary to  investigate this further. Mild periportal edema. Portal and hepatic  veins and IVC all appear normal.  2. Multiple perihepatic fluid collections.  3. Right pleural effusion and associated lung base atelectasis.  4. Gallbladder absent corresponding with liver transplant.    Immunosuppression management:  Induction: Steroid taper and basiliximab x2 per renal sparing protocol.  Maintenance:   - mg BID --> reduced to 500mg BID in setting of diarrhea. Changed to Myfortic 360 mg BID 3/8.   -Tacrolimus: 4mg BID; goal level 8-10  -Prednisone 5mg daily    Neuro/Psych:  Fall: Mechanical fall with head trauma (but no LOC) on 3/9 AM in the setting of frailty and opioid use. CTH without bleeding. Fall precautions.   Acute post op pain in the setting of chronic musculoskeletal + neuropathic  pain pain: On oxycodone PTA for neuropathy?. Previously had tried gabapentin without benefit Continue Oxycodone 2.5mg Q4 along with Tylenol 650mg Q8 PRN, minimize narcotic use due to sedation and fall.  * Pain consult placed for non-opioid management of chronic + neuropathic pain; no indication for chronic opioid use - recommended topical and taper of Oxycodone.   MDD, Anxiety, Autism spectrum: PTA fluoxetine  Acute delirium: Post operatively had ICU delirium with hallucinations. Continue to minimize narcotics as able. Continue melatonin + seroquel at bedtime.    Hematology:   Anemia of chronic disease/Acute blood loss: Hgb stable ~7-9 without overt s/sx of blood loss.   Thrombocytopenia: PLT improving, >200    Cardiorespiratory:   Post op ventilatory support/hypercapnia/acute pulmonary edema: Extubated 3/3. Lethargic and hypercapnic 3/4 AM, improved with BiPAP. Now stable on ambient air.   Hypovolemic/hemorrhagic shock: Secondary to bleeding. Resolved.   Tachycardia: Stable mild tachycardia    GI/Nutrition:   Severe malnutrition in the context of acute illness: Nutrition consulted. FT in place, receiving Novasource Renal, goal 40ml/hr. Regular diet.   * Plan for continued calorie counts given not at goal at this time.   Diarrhea: May be related to tube feeding vs cellcept. Cdiff neg, On Fiber TID.     Endocrine:   DM2; Steroid induced hyperglycemia: on insulin gtt --> Lantus 18 units daily, carb coverage and sliding scale insulin prn     Fluid/Electrolytes/Neph:   ASHISH: Present prior to transplant (prerenal-hypovolemia 2/2 blood loss) now exacerbated by liver transplant, shock. CRRT started 3/1/23. Tolerated iHD 3/4, will plan for MWF. S/p UF on 3/10/23 with 3L out. Tunneled line placed by IR on 3/10.    : Negron removed. Periodic bladder scans.    Prophylaxis: DVT (mechanical), fall, GI (PPI), fungal (Initially received fluconazole, changed to Micafungin d/t CRRT/re-operation), viral (Valcyte), pneumocystis  (Bactrim -restarted 3/6), jalen-op (Zosyn)    Disposition: 7A    CHRISTA/Fellow/Resident Provider: Caren Guevara PA-C [id number]     Faculty: Thelma Sterling M.D.    __________________________________________________________________  Transplant History:    2/28/2023 (Liver), Postoperative day: 11     Interval History:  No acute events overnight. No fevers or chill    His pain was managed overnight with oxycodone + tylenol. Minimal urination. Tolerated UF with 3L removed on 3/10/23. NJ tube in place for feeding + oral intake.    ROS:   A 10-point review of systems was negative except as noted above.    Curent Meds:    aspirin  325 mg Per Feeding Tube Daily     cholecalciferol  25 mcg Per Feeding Tube Daily     fiber modular  1 packet Per Feeding Tube TID     FLUoxetine  60 mg Oral At Bedtime     heparin ANTICOAGULANT  5,000 Units Subcutaneous Q12H     insulin aspart   Subcutaneous TID w/meals     insulin aspart  1-7 Units Subcutaneous TID AC     insulin aspart  1-5 Units Subcutaneous At Bedtime     insulin glargine  18 Units Subcutaneous Q24H     magnesium oxide  400 mg Oral Q24H     melatonin  5 mg Oral QPM     micafungin  100 mg Intravenous Q24H     multivitamin RENAL  1 tablet Oral Daily     mycophenolic acid  360 mg Oral BID IS     OLANZapine zydis  5 mg Oral At Bedtime     pantoprazole  40 mg Per Feeding Tube BID     predniSONE  5 mg Oral Daily     protein modular  1 packet Per Feeding Tube BID     sodium chloride (PF)  3 mL Intravenous Q8H     sulfamethoxazole-trimethoprim  1 tablet Oral or Feeding Tube Once per day on Mon Wed Fri     tacrolimus  4 mg Oral BID IS     ursodiol  300 mg Oral BID     valGANciclovir  450 mg Oral Once per day on Mon Fri     cholecalciferol  25 mcg Oral or Feeding Tube Daily       Physical Exam:     Admit Weight: 65.9 kg (145 lb 4.5 oz)    Current Vitals:   /80 (BP Location: Left arm)   Pulse 106   Temp 98.7  F (37.1  C) (Oral)   Resp 16   Wt 72.3 kg (159 lb 6.4 oz)   SpO2 100%    BMI 26.53 kg/m      Vital sign ranges:    Temp:  [97.7  F (36.5  C)-98.7  F (37.1  C)] 98.7  F (37.1  C)  Pulse:  [] 106  Resp:  [8-20] 16  BP: (126-149)/() 137/80  Cuff Mean (mmHg):  [] 96  SpO2:  [90 %-100 %] 100 %    General Appearance: No acute distress  Skin: warm, dry  Heart: RRR, no murmurs  Lungs: Breathing comfortably on ambient  Abdomen: abdomen soft, non-distended. Incision c/d/i. Rash above incision site without overlying erythema or purulence  Extremities: edema: 1+ Mahendra edema bilaterally  Skin: No jaundice.   Neurologic: Awake, alert, oriented.     Data:   CMP  Recent Labs   Lab 03/11/23  0714 03/11/23  0651 03/10/23  0753 03/10/23  0603 03/06/23  0349 03/06/23  0347 03/05/23  0358 03/05/23  0354   NA  --  128*  --  131*   < > 136  --  134*   POTASSIUM  --  4.4  --  4.4   < > 3.2*  --  3.7   CHLORIDE  --  94*  --  96*   < > 100  --  99   CO2  --  25  --  26   < > 24  --  23   * 373*   < > 191*   < > 119*   < > 180*   BUN  --  30.2*  --  36.2*   < > 96.0*  --  73.5*   CR  --  1.65*  --  1.75*   < > 2.40*  --  2.40*   GFRESTIMATED  --  57*  --  53*   < > 37*  --  37*   SARTHAK  --  8.6  --  9.0   < > 9.0  --  9.2   ICAW  --   --   --   --   --  5.2  --  5.1   MAG  --  2.1  --  1.8   < >  --   --   --    PHOS  --  3.4  --  3.7   < > 4.5  --  5.0*   ALBUMIN  --  3.3*  --  3.2*   < > 2.8*  --  3.0*   BILITOTAL  --  2.6*  --  3.6*   < > 1.5*  --  1.8*   ALKPHOS  --  325*  --  322*   < > 130*  --  122   AST  --  22  --  26   < > 46  --  54*   ALT  --  48  --  76*   < > 181*  --  251*    < > = values in this interval not displayed.     CBC  Recent Labs   Lab 03/11/23  0651 03/10/23  0603   HGB 8.5* 8.2*   WBC 6.5 6.1    167

## 2023-03-11 NOTE — PLAN OF CARE
BP (!) 140/98 (BP Location: Right arm)   Pulse 104   Temp 99.4  F (37.4  C) (Oral)   Resp 16   Wt 70.8 kg (156 lb 1.6 oz)   SpO2 99%   BMI 25.98 kg/m      1513-8621. Slightly tachycardic, slightly hypertensive OVSS on RA. T bili trending down, 2.6 today. BG ACHS, continuing to be elevated (338, 340, 438). Team aware; recheck BG at 1945 after lantus & novolog, notify MD. Patient occassionally requesting oxy for pain despite resting comfortably & appearing quite lethargic at times after napping. Bedside RN has encouraged patient to use non-opioid medication due to lethargy. PRN ozy was administered x1 for reported neck pain. PRN zofran administered x1 for nausea. Regular diet with fair appetite with nadja counts. RN encouraged patient to try and eat meals/drink ensures. Patient did make a great effort during morning! Cycled TF via ND tube 2p-8a at 65 mL/hr. Triple lumen IJ SL, PIV SL. Patient having multiple loose BMs today, occassionally incontinent. Oliguric. Clamshell incision stapled, CHERYL with some tape burns/old rash noted on abdomen. Trace edema noted in BLE. Tremors noted in arms. Patient doubting his own abilities to ambulate in room but is doing great! Able to ambulated with assist x1 & walker. Worked with OT today and went for long walk in hallway. Continue to encourage time OOB and provide positive feedback when patient shows independence. Continue with plan of care and update team with any changes.

## 2023-03-11 NOTE — PLAN OF CARE
Goal Outcome Evaluation:    [id number]  Aox4. Patient can be forgetful. Patient has tremor in both hands bilaterally. Patient complained of having anxiety wanting an inhaler to resolve such issues MD paged about getting an inhaler. Patient is on tube feed 65ml Goal and 30ml flushes. Patient is on cycle and it ends at 0800am. Patient has Right internal jugular, Left PIV saline locked, Right chest HD. Glucose was 352 up from 294. Md paged about this as well. Pt is asymptomatic. Patient is VSS on room air. Patient has generalized weakness and is an assist of 2 for getting up and lowering patient. Hob 30 degrees. Pain managed with oxycodone and tylenol.

## 2023-03-11 NOTE — PROGRESS NOTES
Calorie Count  Intake recorded for: 3/10   Total Kcals: 0 Total Protein: 0g  Kcals from Hospital Food: 0   Protein: 0g  Kcals from Outside Food (average):0 Protein: 0g  # Meals Ordered from Kitchen: 1 meal ordered   # Meals Recorded: no food intake recorded   # Supplements Recorded: 0

## 2023-03-11 NOTE — PROGRESS NOTES
HEMODIALYSIS TREATMENT NOTE    Date: 3/10/2023  Time: 9:52 PM    Data:    Weight change:  3.0kg  Ultrafiltration - Post Run Net Total Removed (mL): 3000 mL  Vascular Access Status: patent  Dialyzer Rinse: Streaked  Total Blood Volume Processed: 77.8 L Liters  Total Dialysis (Treatment) Time: 4 Hours     CVC used     Lab:   No    Interventions:  No labs obtained. Pt tolerated txof 4.0Hr and 3.0L UF removal. CVC C,D,&I- no interventions. CVC locked w Heparin and caps changed. Pt received Albumin prime and 1730 / 1800 medications. See MAR per time and dose, pt ordered meal but did not eat during tx. Pt had 2 BM during tx- Linen changed and brief changed. RN writer noticed and Notified PCN during report of new findings of red/ bleeding area round brief.    Assessment:  A&Ox3, Hr-RRR. 20rpm. Lung sounds diminished ant & post BUL/BLL. Trace edema present. Pt c/o pain- CAM therapy applied. Effective. No complaints since last tx r/t iHD, CVC patent and locked w heparin post tx.      Plan:    Per renal and pcn

## 2023-03-12 ENCOUNTER — APPOINTMENT (OUTPATIENT)
Dept: PHYSICAL THERAPY | Facility: CLINIC | Age: 28
DRG: 005 | End: 2023-03-12
Attending: STUDENT IN AN ORGANIZED HEALTH CARE EDUCATION/TRAINING PROGRAM
Payer: COMMERCIAL

## 2023-03-12 LAB
ALBUMIN SERPL BCG-MCNC: 3.4 G/DL (ref 3.5–5.2)
ALBUMIN UR-MCNC: 100 MG/DL
ALP SERPL-CCNC: 344 U/L (ref 40–129)
ALT SERPL W P-5'-P-CCNC: 33 U/L (ref 10–50)
ANION GAP SERPL CALCULATED.3IONS-SCNC: 11 MMOL/L (ref 7–15)
APPEARANCE UR: ABNORMAL
AST SERPL W P-5'-P-CCNC: 21 U/L (ref 10–50)
BILIRUB DIRECT SERPL-MCNC: 1.58 MG/DL (ref 0–0.3)
BILIRUB SERPL-MCNC: 2.2 MG/DL
BILIRUB UR QL STRIP: ABNORMAL
BUN SERPL-MCNC: 54.9 MG/DL (ref 6–20)
CALCIUM SERPL-MCNC: 9.6 MG/DL (ref 8.6–10)
CAOX CRY #/AREA URNS HPF: ABNORMAL /HPF
CHLORIDE SERPL-SCNC: 92 MMOL/L (ref 98–107)
COLOR UR AUTO: ABNORMAL
CREAT SERPL-MCNC: 2.52 MG/DL (ref 0.67–1.17)
DEPRECATED HCO3 PLAS-SCNC: 24 MMOL/L (ref 22–29)
ERYTHROCYTE [DISTWIDTH] IN BLOOD BY AUTOMATED COUNT: 20.5 % (ref 10–15)
GFR SERPL CREATININE-BSD FRML MDRD: 34 ML/MIN/1.73M2
GLUCOSE BLDC GLUCOMTR-MCNC: 124 MG/DL (ref 70–99)
GLUCOSE BLDC GLUCOMTR-MCNC: 126 MG/DL (ref 70–99)
GLUCOSE BLDC GLUCOMTR-MCNC: 137 MG/DL (ref 70–99)
GLUCOSE BLDC GLUCOMTR-MCNC: 147 MG/DL (ref 70–99)
GLUCOSE BLDC GLUCOMTR-MCNC: 199 MG/DL (ref 70–99)
GLUCOSE BLDC GLUCOMTR-MCNC: 215 MG/DL (ref 70–99)
GLUCOSE BLDC GLUCOMTR-MCNC: 222 MG/DL (ref 70–99)
GLUCOSE BLDC GLUCOMTR-MCNC: 243 MG/DL (ref 70–99)
GLUCOSE BLDC GLUCOMTR-MCNC: 283 MG/DL (ref 70–99)
GLUCOSE BLDC GLUCOMTR-MCNC: 336 MG/DL (ref 70–99)
GLUCOSE BLDC GLUCOMTR-MCNC: 347 MG/DL (ref 70–99)
GLUCOSE BLDC GLUCOMTR-MCNC: 80 MG/DL (ref 70–99)
GLUCOSE BLDC GLUCOMTR-MCNC: 94 MG/DL (ref 70–99)
GLUCOSE SERPL-MCNC: 203 MG/DL (ref 70–99)
GLUCOSE UR STRIP-MCNC: 30 MG/DL
HBA1C MFR BLD: 5.2 %
HCT VFR BLD AUTO: 28.1 % (ref 40–53)
HGB BLD-MCNC: 8.8 G/DL (ref 13.3–17.7)
HGB UR QL STRIP: ABNORMAL
KETONES UR STRIP-MCNC: NEGATIVE MG/DL
LEUKOCYTE ESTERASE UR QL STRIP: ABNORMAL
MAGNESIUM SERPL-MCNC: 1.9 MG/DL (ref 1.7–2.3)
MCH RBC QN AUTO: 31.9 PG (ref 26.5–33)
MCHC RBC AUTO-ENTMCNC: 31.3 G/DL (ref 31.5–36.5)
MCV RBC AUTO: 102 FL (ref 78–100)
NITRATE UR QL: NEGATIVE
PH UR STRIP: 5 [PH] (ref 5–7)
PHOSPHATE SERPL-MCNC: 3.3 MG/DL (ref 2.5–4.5)
PLATELET # BLD AUTO: 219 10E3/UL (ref 150–450)
POTASSIUM SERPL-SCNC: 5 MMOL/L (ref 3.4–5.3)
PROT SERPL-MCNC: 5.9 G/DL (ref 6.4–8.3)
RBC # BLD AUTO: 2.76 10E6/UL (ref 4.4–5.9)
RBC URINE: 20 /HPF
SODIUM SERPL-SCNC: 127 MMOL/L (ref 136–145)
SP GR UR STRIP: 1.03 (ref 1–1.03)
TACROLIMUS BLD-MCNC: 12.5 UG/L (ref 5–15)
TME LAST DOSE: NORMAL H
TME LAST DOSE: NORMAL H
TRANSITIONAL EPI: 1 /HPF
UROBILINOGEN UR STRIP-MCNC: NORMAL MG/DL
WBC # BLD AUTO: 8.1 10E3/UL (ref 4–11)
WBC URINE: 112 /HPF

## 2023-03-12 PROCEDURE — 87040 BLOOD CULTURE FOR BACTERIA: CPT | Performed by: STUDENT IN AN ORGANIZED HEALTH CARE EDUCATION/TRAINING PROGRAM

## 2023-03-12 PROCEDURE — 250N000013 HC RX MED GY IP 250 OP 250 PS 637: Performed by: INTERNAL MEDICINE

## 2023-03-12 PROCEDURE — 83735 ASSAY OF MAGNESIUM: CPT | Performed by: PHYSICIAN ASSISTANT

## 2023-03-12 PROCEDURE — 90937 HEMODIALYSIS REPEATED EVAL: CPT

## 2023-03-12 PROCEDURE — 250N000012 HC RX MED GY IP 250 OP 636 PS 637: Performed by: PHYSICIAN ASSISTANT

## 2023-03-12 PROCEDURE — 84100 ASSAY OF PHOSPHORUS: CPT | Performed by: PHYSICIAN ASSISTANT

## 2023-03-12 PROCEDURE — 250N000011 HC RX IP 250 OP 636: Performed by: INTERNAL MEDICINE

## 2023-03-12 PROCEDURE — 97116 GAIT TRAINING THERAPY: CPT | Mod: GP

## 2023-03-12 PROCEDURE — 80197 ASSAY OF TACROLIMUS: CPT | Performed by: PHYSICIAN ASSISTANT

## 2023-03-12 PROCEDURE — 250N000011 HC RX IP 250 OP 636: Performed by: PHYSICIAN ASSISTANT

## 2023-03-12 PROCEDURE — 81001 URINALYSIS AUTO W/SCOPE: CPT | Performed by: STUDENT IN AN ORGANIZED HEALTH CARE EDUCATION/TRAINING PROGRAM

## 2023-03-12 PROCEDURE — 250N000009 HC RX 250: Performed by: STUDENT IN AN ORGANIZED HEALTH CARE EDUCATION/TRAINING PROGRAM

## 2023-03-12 PROCEDURE — 87086 URINE CULTURE/COLONY COUNT: CPT | Performed by: STUDENT IN AN ORGANIZED HEALTH CARE EDUCATION/TRAINING PROGRAM

## 2023-03-12 PROCEDURE — 36592 COLLECT BLOOD FROM PICC: CPT | Performed by: STUDENT IN AN ORGANIZED HEALTH CARE EDUCATION/TRAINING PROGRAM

## 2023-03-12 PROCEDURE — 250N000013 HC RX MED GY IP 250 OP 250 PS 637: Performed by: PHYSICIAN ASSISTANT

## 2023-03-12 PROCEDURE — 80053 COMPREHEN METABOLIC PANEL: CPT | Performed by: PHYSICIAN ASSISTANT

## 2023-03-12 PROCEDURE — 36592 COLLECT BLOOD FROM PICC: CPT | Performed by: PHYSICIAN ASSISTANT

## 2023-03-12 PROCEDURE — 83036 HEMOGLOBIN GLYCOSYLATED A1C: CPT | Performed by: STUDENT IN AN ORGANIZED HEALTH CARE EDUCATION/TRAINING PROGRAM

## 2023-03-12 PROCEDURE — 258N000003 HC RX IP 258 OP 636: Performed by: INTERNAL MEDICINE

## 2023-03-12 PROCEDURE — 258N000003 HC RX IP 258 OP 636: Performed by: PHYSICIAN ASSISTANT

## 2023-03-12 PROCEDURE — 97530 THERAPEUTIC ACTIVITIES: CPT | Mod: GP

## 2023-03-12 PROCEDURE — 120N000011 HC R&B TRANSPLANT UMMC

## 2023-03-12 PROCEDURE — 82248 BILIRUBIN DIRECT: CPT | Performed by: PHYSICIAN ASSISTANT

## 2023-03-12 PROCEDURE — 250N000013 HC RX MED GY IP 250 OP 250 PS 637: Performed by: STUDENT IN AN ORGANIZED HEALTH CARE EDUCATION/TRAINING PROGRAM

## 2023-03-12 PROCEDURE — 85027 COMPLETE CBC AUTOMATED: CPT | Performed by: PHYSICIAN ASSISTANT

## 2023-03-12 PROCEDURE — 97110 THERAPEUTIC EXERCISES: CPT | Mod: GP

## 2023-03-12 RX ORDER — CALCIUM CARBONATE 500 MG/1
500 TABLET, CHEWABLE ORAL DAILY PRN
Status: DISCONTINUED | OUTPATIENT
Start: 2023-03-12 | End: 2023-03-19 | Stop reason: HOSPADM

## 2023-03-12 RX ORDER — TACROLIMUS 1 MG/1
3 CAPSULE ORAL
Status: DISCONTINUED | OUTPATIENT
Start: 2023-03-13 | End: 2023-03-17

## 2023-03-12 RX ORDER — TACROLIMUS 1 MG/1
2 CAPSULE ORAL
Status: COMPLETED | OUTPATIENT
Start: 2023-03-12 | End: 2023-03-12

## 2023-03-12 RX ADMIN — ASPIRIN 325 MG ORAL TABLET 325 MG: 325 PILL ORAL at 08:13

## 2023-03-12 RX ADMIN — URSODIOL 300 MG: 300 CAPSULE ORAL at 08:13

## 2023-03-12 RX ADMIN — SODIUM CHLORIDE 250 ML: 9 INJECTION, SOLUTION INTRAVENOUS at 13:25

## 2023-03-12 RX ADMIN — MICAFUNGIN 100 MG: 10 INJECTION, POWDER, LYOPHILIZED, FOR SOLUTION INTRAVENOUS at 08:12

## 2023-03-12 RX ADMIN — OXYCODONE HYDROCHLORIDE 5 MG: 5 TABLET ORAL at 19:28

## 2023-03-12 RX ADMIN — Medication 25 MCG: at 08:13

## 2023-03-12 RX ADMIN — HEPARIN SODIUM 5000 UNITS: 5000 INJECTION, SOLUTION INTRAVENOUS; SUBCUTANEOUS at 16:40

## 2023-03-12 RX ADMIN — SODIUM CHLORIDE 300 ML: 9 INJECTION, SOLUTION INTRAVENOUS at 13:25

## 2023-03-12 RX ADMIN — Medication 25 MCG: at 08:23

## 2023-03-12 RX ADMIN — FLUOXETINE HYDROCHLORIDE 60 MG: 40 CAPSULE ORAL at 21:43

## 2023-03-12 RX ADMIN — Medication 40 MG: at 08:13

## 2023-03-12 RX ADMIN — MYCOPHENOLIC ACID 360 MG: 360 TABLET, DELAYED RELEASE ORAL at 17:53

## 2023-03-12 RX ADMIN — OXYCODONE HYDROCHLORIDE 5 MG: 5 TABLET ORAL at 11:51

## 2023-03-12 RX ADMIN — MAGNESIUM OXIDE TAB 400 MG (241.3 MG ELEMENTAL MG) 400 MG: 400 (241.3 MG) TAB at 12:30

## 2023-03-12 RX ADMIN — CALCIUM CARBONATE (ANTACID) CHEW TAB 500 MG 500 MG: 500 CHEW TAB at 22:41

## 2023-03-12 RX ADMIN — PREDNISONE 5 MG: 5 TABLET ORAL at 08:13

## 2023-03-12 RX ADMIN — HEPARIN SODIUM 5000 UNITS: 5000 INJECTION, SOLUTION INTRAVENOUS; SUBCUTANEOUS at 03:45

## 2023-03-12 RX ADMIN — URSODIOL 300 MG: 300 CAPSULE ORAL at 20:06

## 2023-03-12 RX ADMIN — MYCOPHENOLIC ACID 360 MG: 360 TABLET, DELAYED RELEASE ORAL at 08:13

## 2023-03-12 RX ADMIN — HUMAN INSULIN 4.5 UNITS/HR: 100 INJECTION, SOLUTION SUBCUTANEOUS at 00:20

## 2023-03-12 RX ADMIN — OXYCODONE HYDROCHLORIDE 5 MG: 5 TABLET ORAL at 03:58

## 2023-03-12 RX ADMIN — TACROLIMUS 2 MG: 1 CAPSULE ORAL at 17:53

## 2023-03-12 RX ADMIN — Medication: at 13:27

## 2023-03-12 RX ADMIN — Medication 40 MG: at 20:07

## 2023-03-12 RX ADMIN — TACROLIMUS 4 MG: 1 CAPSULE ORAL at 08:13

## 2023-03-12 RX ADMIN — B-COMPLEX W/ C & FOLIC ACID TAB 1 MG 1 TABLET: 1 TAB at 08:13

## 2023-03-12 RX ADMIN — Medication 5 MG: at 20:06

## 2023-03-12 RX ADMIN — OLANZAPINE 5 MG: 5 TABLET, ORALLY DISINTEGRATING ORAL at 21:43

## 2023-03-12 RX ADMIN — ONDANSETRON 4 MG: 4 TABLET, ORALLY DISINTEGRATING ORAL at 20:31

## 2023-03-12 ASSESSMENT — ACTIVITIES OF DAILY LIVING (ADL)
ADLS_ACUITY_SCORE: 41

## 2023-03-12 NOTE — PROGRESS NOTES
Calorie Count  Intake recorded for: 3/11  Total Kcals: 771 Total Protein: 33g  Kcals from Hospital Food: 771   Protein: 33g  Kcals from Outside Food (average): 0  Protein: 0g  # Meals Ordered from Kitchen: 1 meal  # Meals Recorded: 1 meal ( 50% 2 pancakes w/ butter and syrup)  # Supplements Recorded: 100% 1 Ensure Enlive Vanilla, 50% 1 Ensure Enlive Vanilla

## 2023-03-12 NOTE — PROGRESS NOTES
"SPIRITUAL HEALTH SERVICES Progress Note  [facility] ([place]) 7A    Saw pt Dillon Salter per Follow up visit--on Friday Dillon was unavailable but his family requested a rosary and prayer book for him, which I delivered and promised I would return to see Dillon another time.    Patient/Family Understanding of Illness and Goals of Care - Dillon shared that his liver transplant could not have gone better. The first couple days of recovery were rough but he is feeling much better today. He is still receiving dialysis as well. His mother was present but asleep during my visit.    Distress and Loss - Dillon shared about his struggle with alcohol and how he almost lost his life at such a young age. He was able to quit drinking and has maintained sobriety.     Strengths, Coping, and Resources - Dillon lives with his parents and shared that even though they're his parents, sometimes it feels like they are his best friends. He named other family members that are supportive as well.     Meaning, Beliefs, and Spirituality - Dillon said \"when you are becoming sober your mind gets sharper and things you really need rise to the surface and one of those things was God.\" This second chance at life has renewed his livier/relationship with God. Dillon identifies as Jew and appreciated the opportunity to pray together. He shared a prayer and I shared a prayer.     Plan of Care - I provided a prayer and let Dillon know I would stop by to see him again depending on how long he is hospitalized.     Calixto Deleon MDiv  Chaplain Resident  Pager [phone]    * [facility] remains available 24/7 for emergent requests/referrals, either by having the switchboard page the on-call  or by entering an ASAP/STAT consult in Epic (this will also page the on-call ). Routine Epic consults receive an initial response within 24 hours.*    "

## 2023-03-12 NOTE — PROGRESS NOTES
Transplant Surgery  Inpatient Daily Progress Note  2023    Assessment & Plan: Dillon Salter is a 27 year old male with a past medical history of Asperger's, DM2, and alcoholic cirrhosis c/b esophageal varices and hepatic encephalopathy. He is now s/p  donor liver transplant without stent on 23 with Dr. Sterling. Return to OR for washout and repair of arterial bleeding on 3/1/23.    s/p DDLT 23: POD #12. Patient with elevated liver studies with total bilirubin now slowly decreasing again: 2.2 from 2.6 from 3.6 and alkaline phosphatase rising to 344. Abdominal US 3/7 with patent vasculature.   - Continue ASA 325mg daily  - Continue ursodiol 300mg BID    Imaging:  -3/1 US: resolution of hematoma, patent vessels  -3/3 US: Patent, resolution of perihepatic hematoma with new perihepatic hematoma up to 9 cm.  -3/7 US with good flow.   -3/10 MRCP:   1. Liver transplant. Abrupt caliber change in the common bile duct  which may be related to anastomosis. There is narrowing of the common  hepatic duct and central intrahepatic ducts. ERCP may be necessary to  investigate this further. Mild periportal edema. Portal and hepatic  veins and IVC all appear normal.  2. Multiple perihepatic fluid collections.  3. Right pleural effusion and associated lung base atelectasis.  4. Gallbladder absent corresponding with liver transplant.    Immunosuppression management:  Induction: Steroid taper and basiliximab x2 per renal sparing protocol.  Maintenance:   - mg BID --> reduced to 500mg BID in setting of diarrhea. Changed to Myfortic 360 mg BID 3/8.   -Tacrolimus: 3mg BID; goal level 8-10  -Prednisone 5mg daily    Neuro/Psych:  Fall: Mechanical fall with head trauma (but no LOC) on 3/9 AM in the setting of frailty and opioid use. CTH without bleeding. Fall precautions.   Acute post op pain in the setting of chronic musculoskeletal + neuropathic pain pain: On oxycodone PTA for neuropathy?. Previously had  tried gabapentin without benefit Continue Oxycodone 2.5mg Q4 along with Tylenol 650mg Q8 PRN, minimize narcotic use due to sedation and fall.  * Pain consult placed for non-opioid management of chronic + neuropathic pain; no indication for chronic opioid use - recommended topical and taper of Oxycodone.   MDD, Anxiety, Autism spectrum: PTA fluoxetine  Acute delirium: Post operatively had ICU delirium with hallucinations. Continue to minimize narcotics as able. Continue melatonin + seroquel at bedtime.    Hematology:   Anemia of chronic disease/Acute blood loss: Hgb stable ~7-9 without overt s/sx of blood loss.   Thrombocytopenia: PLT improving, >200    Cardiorespiratory:   Post op ventilatory support/hypercapnia/acute pulmonary edema: Extubated 3/3. Lethargic and hypercapnic 3/4 AM, improved with BiPAP. Now stable on ambient air.   Hypovolemic/hemorrhagic shock: Secondary to bleeding. Resolved.   Tachycardia: Stable mild tachycardia    GI/Nutrition:   Severe malnutrition in the context of acute illness: Nutrition consulted. FT in place, receiving Novasource Renal, goal 50ml/hr cycled. Regular diet.   * Plan for continued calorie counts given not at goal at this time.   Diarrhea: May be related to tube feeding vs cellcept. Cdiff neg, On Fiber TID.     Endocrine:   DM2; Steroid induced hyperglycemia: on insulin gtt --> Lantus 20 units BID (home dosing), carb coverage and sliding scale insulin prn     Fluid/Electrolytes/Neph:   ASHISH: Present prior to transplant (prerenal-hypovolemia 2/2 blood loss) now exacerbated by liver transplant, shock. CRRT started 3/1/23. Tolerated iHD 3/4. Tunneled line placed by IR on 3/10. HD today per nephrology.     : Negron removed. Periodic bladder scans.    Prophylaxis: DVT (mechanical), fall, GI (PPI), fungal (Initially received fluconazole, changed to Micafungin d/t CRRT/re-operation), viral (Valcyte), pneumocystis (Bactrim -restarted 3/6), jalen-op (Zosyn)    Disposition:  7A    CHRISTA/Fellow/Resident Provider: Caren Guevara PA-C [id number]     Faculty: Thelma Sterling M.D.    __________________________________________________________________  Transplant History:    2/28/2023 (Liver), Postoperative day: 12     Interval History:  No acute events overnight. No fevers or chill    No appetite but increasing oral intake.     ROS:   A 10-point review of systems was negative except as noted above.    Curent Meds:    sodium chloride 0.9%  250 mL Intravenous Once in dialysis/CRRT     sodium chloride 0.9%  300 mL Hemodialysis Machine Once     aspirin  325 mg Per Feeding Tube Daily     cholecalciferol  25 mcg Per Feeding Tube Daily     fiber modular  1 packet Per Feeding Tube TID     FLUoxetine  60 mg Oral At Bedtime     heparin ANTICOAGULANT  5,000 Units Subcutaneous Q12H     insulin aspart   Subcutaneous TID w/meals     insulin glargine  20 Units Subcutaneous BID     magnesium oxide  400 mg Oral Q24H     melatonin  5 mg Oral QPM     micafungin  100 mg Intravenous Q24H     multivitamin RENAL  1 tablet Oral Daily     mycophenolic acid  360 mg Oral BID IS     - MEDICATION INSTRUCTIONS -   Does not apply Once     OLANZapine zydis  5 mg Oral At Bedtime     pantoprazole  40 mg Per Feeding Tube BID     predniSONE  5 mg Oral Daily     protein modular  1 packet Per Feeding Tube BID     sodium chloride (PF)  3 mL Intravenous Q8H     sulfamethoxazole-trimethoprim  1 tablet Oral or Feeding Tube Once per day on Mon Wed Fri     tacrolimus  2 mg Oral QPM     [START ON 3/13/2023] tacrolimus  3 mg Oral BID IS     ursodiol  300 mg Oral BID     valGANciclovir  450 mg Oral Once per day on Mon Fri     cholecalciferol  25 mcg Oral or Feeding Tube Daily       Physical Exam:     Admit Weight: 65.9 kg (145 lb 4.5 oz)    Current Vitals:   BP (!) 142/85   Pulse 109   Temp 98.6  F (37  C) (Oral)   Resp 18   Wt 72.8 kg (160 lb 6.4 oz)   SpO2 98%   BMI 26.69 kg/m      Vital sign ranges:    Temp:  [97.6  F (36.4  C)-99.4  F  (37.4  C)] 98.6  F (37  C)  Pulse:  [104-112] 109  Resp:  [16-18] 18  BP: (140-156)/(82-98) 142/85  SpO2:  [98 %-100 %] 98 %    General Appearance: No acute distress  Skin: warm, dry  Heart: RRR  Lungs: Breathing comfortably on ambient  Abdomen: abdomen soft, non-distended. Incision c/d/i. Rash above incision site without overlying erythema or purulence  Extremities: edema: 1+ Mahendra edema bilaterally  Skin: No jaundice.   Neurologic: Awake, alert, oriented.     Data:   CMP  Recent Labs   Lab 03/12/23  1227 03/12/23  1105 03/12/23  0810 03/12/23  0721 03/11/23  0714 03/11/23  0651 03/06/23  0349 03/06/23  0347   NA  --   --   --  127*  --  128*   < > 136   POTASSIUM  --   --   --  5.0  --  4.4   < > 3.2*   CHLORIDE  --   --   --  92*  --  94*   < > 100   CO2  --   --   --  24  --  25   < > 24   GLC 94 80   < > 203*   < > 373*   < > 119*   BUN  --   --   --  54.9*  --  30.2*   < > 96.0*   CR  --   --   --  2.52*  --  1.65*   < > 2.40*   GFRESTIMATED  --   --   --  34*  --  57*   < > 37*   SARTHAK  --   --   --  9.6  --  8.6   < > 9.0   ICAW  --   --   --   --   --   --   --  5.2   MAG  --   --   --  1.9  --  2.1   < >  --    PHOS  --   --   --  3.3  --  3.4   < > 4.5   ALBUMIN  --   --   --  3.4*  --  3.3*   < > 2.8*   BILITOTAL  --   --   --  2.2*  --  2.6*   < > 1.5*   ALKPHOS  --   --   --  344*  --  325*   < > 130*   AST  --   --   --  21  --  22   < > 46   ALT  --   --   --  33  --  48   < > 181*    < > = values in this interval not displayed.     CBC  Recent Labs   Lab 03/12/23  0721 03/12/23  0006 03/11/23  0651   HGB 8.8*  --  8.5*   WBC 8.1  --  6.5     --  200   A1C  --  5.2  --

## 2023-03-12 NOTE — PLAN OF CARE
Goal Outcome Evaluation:    BP (!) 154/93 (BP Location: Right arm)   Pulse 111   Temp 98.4  F (36.9  C) (Oral)   Resp 18   Wt 70.8 kg (156 lb 1.6 oz)   SpO2 98%   BMI 25.98 kg/m      Shift: 4086-8816  VS: Slightly HTN, otherwise vitals stable, afebrile  Neuro: Alert and oriented x4   BG: Insulin drip algo #2  Labs: Awaiting AM labs   Respiratory: WDL  Cardiac: WDL  Pain/Nausea: prn oxy given x1, denies nausea   Diet: Regular diet, TF at 65   IV Access: PIV x1, internal jugular, R chest HD   GI/: Voiding, last BM 3/11  Skin: Clamshell incision w/ staples CHERYL   Mobility: Assist x1  Plan: Continue with POC and notify team with any changes

## 2023-03-12 NOTE — PROVIDER NOTIFICATION
" On call provider pacheco paged at 2120  \"7A 213 KARMA Salter   pt  at 9pm, lantus given at pm. takes lantus BID 20 units at home. incisional pain 8/10, wants oxy increased to 5mg d/t lack of control. fmily frustrated with BG and pain control    CLARI Salinas [phone]\"    2140: provider placed order for 5mg oxy. Awaiting to hear about BG    2230: provider paged again    \"7A 213-2 KARMA Salter    pt Bg at 10pm was 464, 5 units of novolog given per order. please place order for drip as BG have not been controlled.    Radha Salinas, [phone]\"  "

## 2023-03-12 NOTE — PLAN OF CARE
BP (!) 156/95 (BP Location: Right arm, Patient Position: Sitting)   Pulse 112   Temp 98  F (36.7  C) (Oral)   Resp 18   Wt 70.8 kg (156 lb 1.6 oz)   SpO2 100%   BMI 25.98 kg/m      Shift: 3062-8872  VS: HTN on RA, afebrile  Neuro: AOx4  BG: ACHS,  and 464; correcting novolog given for 464, provider paged  Respiratory: GONZALEZ  Pain/Nausea/PRN: zofran given for nausea; oxycodone increased to 5mg from 2.5mg, given x1  Diet: regular, low appetite  LDA: PIVx1 SL, internal jugular SL, HD line; ND with TF running at 65 (2p-8a); pt frustrated with rate of TF as its decreasing appetite  GI/: oliguric, 1 loose BM  Skin: clamshell stapled and no drainage; scabbing surrounding; 2 scabbed JUANA sites  Mobility: asx1 walker  Plan: hopeful start of insulin gtt given BG    Handoff given to following RN.

## 2023-03-12 NOTE — PROGRESS NOTES
[facility]   Transplant Nephrology Progress Note  Date of Admission:  2/11/2023  Today's Date: 03/12/2023    Recommendations:  - dialyze today, 2L UF.     Assessment & Plan   # ASHISH: Increased.  Unclear urine output with some unmeasured voids as part of watery stools., but likely anuric/oliguric.     - ASHISH likely secondary to hemorrhagic shock and hemodynamic changes following liver transplant, as well as mild HRS prior to transplant.  - HD Info  Access: Temporary Catheter right IJ, Days: MWF, Length: 4.0 hrs, EDW: 66.0 kg, Heparin: No, MARISEL: No, IV Iron: No, Vit D analog: No   - Baseline Creatinine: ~ 0.6-0.8   - Proteinuria: Normal (<0.2 grams)    # Liver Tx: ESLD secondary to alcoholic cirrhosis, s/p OLT 2/28/23.  Trend down in transaminases, but increased total bilirubin.  Followed by Transplant Surgery.    # Immunosuppression: Tacrolimus immediate release (goal 5-8), Mycophenolic acid (dose 360 mg every 12 hours) and Prednisone (dose 5 mg daily)   - Induction with Recent Transplant:  Per Liver Tx protocol.   - Changes: Not at this time; Management per Transplant Surgery.    # Infection Prophylaxis:   - PJP: Sulfa/TMP (Bactrim)  - CMV: Valganciclovir (Valcyte); CMV IgG Ab recipient and donor negative  - Thrush: Micafungin (Mycamine)  - Fungal: Micafungin (Mycamine)    # Blood Pressure: Borderline control;  Goal BP: < 140/90 (Hospitalization goal)   - Volume status: Mildly hypervolemic  EDW ~ 66.0 kg   - Changes: Not at this time     # Diabetes: Controlled (HbA1c <7%) Last HbA1c: 6.1%   - Management as per primary team.    # Anemia in Chronic Disease: Hgb: Stable, low      MARISEL: No   - Iron studies: Low iron saturation, but high ferritin (2/22/23)    # Mineral Bone Disorder:   - Vitamin D; level: Low (12/20/22)        On supplement: Yes  - Calcium; level: Normal        On supplement: No  - Phosphorus; level: Normal        On binder: No    # Electrolytes:   - Potassium;  level: Normal        On supplement: No  - Magnesium; level: Normal        On supplement: Yes, magnesium oxide 400 mg QD  - Bicarbonate; level: Normal        On supplement: No    # Malnutrition: Patient on tube feedings.    # Diarrhea: Some loose stools.  Now started on fiber.  C. diff negative 3/5.    # EBV IgG Ab Discordance (D+/R-): Will do surveillance EBV PCR qmonth until 12 months post transplant, then q3 months until 2 years post transplant.    # Aspergers/Depression/Hallucinations: Patient appears to be having less hallucinations per his mother.  Seen by Psychiatry and started on olanzapine, as well as continuing on fluoxetine.    # Transplant History:  Etiology of Organ Failure: Alcoholic cirrhosis  Tx: Liver Tx  Transplant: 2/28/2023 (Liver)  Donor Type:  Donor Class:   Crossmatch at time of Tx: negative  DSA at time of Tx: No   Significant changes in immunosuppression: Lower CNI goal due to ASHISH  Significant transplant-related complications: ASHISH    Recommendations were communicated to the primary team via this note.    Patient dicussed with VAMSHI Boles CNP   Pager: [phone]         Interval History   Mr. Salter's creatinine is 2.52 (03/12 071); Increased will dialyze today.   Unmeasured urine output. Patient is unsure of his urine output.   Normal transaminases, but increased total bilirubin.  Other significant labs/tests/vitals: Stable electrolytes.   No new events overnight.  MRCP done 3/10 (Results pending)  Leg edema noted.     Review of Systems   4 point ROS was obtained and negative except as noted in the Interval History.    MEDICATIONS:    sodium chloride 0.9%  250 mL Intravenous Once in dialysis/CRRT     sodium chloride 0.9%  300 mL Hemodialysis Machine Once     aspirin  325 mg Per Feeding Tube Daily     cholecalciferol  25 mcg Per Feeding Tube Daily     fiber modular  1 packet Per Feeding Tube TID     FLUoxetine  60 mg Oral At Bedtime     heparin ANTICOAGULANT  5,000  Units Subcutaneous Q12H     insulin aspart   Subcutaneous TID w/meals     magnesium oxide  400 mg Oral Q24H     melatonin  5 mg Oral QPM     micafungin  100 mg Intravenous Q24H     multivitamin RENAL  1 tablet Oral Daily     mycophenolic acid  360 mg Oral BID IS     - MEDICATION INSTRUCTIONS -   Does not apply Once     OLANZapine zydis  5 mg Oral At Bedtime     pantoprazole  40 mg Per Feeding Tube BID     predniSONE  5 mg Oral Daily     protein modular  1 packet Per Feeding Tube BID     sodium chloride (PF)  3 mL Intravenous Q8H     sulfamethoxazole-trimethoprim  1 tablet Oral or Feeding Tube Once per day on      tacrolimus  4 mg Oral BID IS     ursodiol  300 mg Oral BID     valGANciclovir  450 mg Oral Once per day on      cholecalciferol  25 mcg Oral or Feeding Tube Daily       dextrose Stopped (23 1544)     dextrose 1,000 mL (03/10/23 0753)     insulin regular 0 Units/hr (23 1100)       Physical Exam   Temp  Av.6  F (36.4  C)  Min: 92.8  F (33.8  C)  Max: 99.5  F (37.5  C)  Arterial Line BP  Min: 78/56  Max: 173/78  Arterial Line MAP (mmHg)  Av.4 mmHg  Min: 56 mmHg  Max: 190 mmHg      Pulse  Av.4  Min: 62  Max: 112 Resp  Avg: 15.7  Min: 8  Max: 34  FiO2 (%)  Av.5 %  Min: 30 %  Max: 100 %  SpO2  Av.6 %  Min: 84 %  Max: 100 %    CVP (mmHg): 8 mmHgBP (!) 142/85   Pulse 109   Temp 98.6  F (37  C) (Oral)   Resp 18   Wt 72.8 kg (160 lb 6.4 oz)   SpO2 98%   BMI 26.69 kg/m     Date 23 0700 - 23 0659   Shift 8655-1783 3230-6361 7924-6266 24 Hour Total   INTAKE   I.V. 156   156   NG/GT 80   80   Enteral 160   160   Shift Total(mL/kg) 396(5.04)   396(5.04)   OUTPUT   Shift Total(mL/kg)       Weight (kg) 78.5 78.5 78.5 78.5      Admit Weight: 65.9 kg (145 lb 4.5 oz)     GENERAL APPEARANCE: Lethargic.  HENT: mouth without ulcers or lesions, NG in place  RESP: lungs clear to auscultation - no rales, rhonchi or wheezes  CV: regular rhythm, normal rate, no  rub, no murmur  EDEMA: 1+-2+ LE and dependent edema bilaterally  ABDOMEN: soft, nondistended, mild TTP, bowel sounds normal  MS: extremities normal - no gross deformities noted, no evidence of inflammation in joints, no muscle tenderness  SKIN: no rash  DIALYSIS ACCESS:  Right IJ tunneled catheter     Data   All labs reviewed by me.  CMP  Recent Labs   Lab 03/12/23  1105 03/12/23  1002 03/12/23  0914 03/12/23  0810 03/12/23  0721 03/11/23  0714 03/11/23  0651 03/10/23  0753 03/10/23  0603 03/09/23  1008 03/09/23  0446 03/09/23  0406 03/09/23  0134   NA  --   --   --   --  127*  --  128*  --  131*  --   --   --  135*   POTASSIUM  --   --   --   --  5.0  --  4.4  --  4.4  --   --   --  4.0   CHLORIDE  --   --   --   --  92*  --  94*  --  96*  --   --   --  99   CO2  --   --   --   --  24  --  25  --  26  --   --   --  27   ANIONGAP  --   --   --   --  11  --  9  --  9  --   --   --  9   GLC 80 126* 137* 147* 203*   < > 373*   < > 191*   < >  --    < > 211*   BUN  --   --   --   --  54.9*  --  30.2*  --  36.2*  --   --   --  31.8*   CR  --   --   --   --  2.52*  --  1.65*  --  1.75*  --   --   --  1.12   GFRESTIMATED  --   --   --   --  34*  --  57*  --  53*  --   --   --  >90   SARTHAK  --   --   --   --  9.6  --  8.6  --  9.0  --   --   --  8.0*   MAG  --   --   --   --  1.9  --  2.1  --  1.8  --   --   --  1.6*   PHOS  --   --   --   --  3.3  --  3.4  --  3.7  --  2.6  --  2.2*   PROTTOTAL  --   --   --   --  5.9*  --  5.6*  --  5.4*  --  5.2*  --  5.1*   ALBUMIN  --   --   --   --  3.4*  --  3.3*  --  3.2*  --  3.2*  --  3.1*   BILITOTAL  --   --   --   --  2.2*  --  2.6*  --  3.6*  --  3.4*  --  3.3*   ALKPHOS  --   --   --   --  344*  --  325*  --  322*  --  294*  --  277*   AST  --   --   --   --  21  --  22  --  26  --  33  --  33   ALT  --   --   --   --  33  --  48  --  76*  --  90*  --  92*    < > = values in this interval not displayed.     CBC  Recent Labs   Lab 03/12/23  0721 03/11/23  0651 03/10/23  0603  03/09/23  0134   HGB 8.8* 8.5* 8.2* 7.8*   WBC 8.1 6.5 6.1 7.6   RBC 2.76* 2.63* 2.61* 2.47*   HCT 28.1* 26.5* 26.6* 24.7*   * 101* 102* 100   MCH 31.9 32.3 31.4 31.6   MCHC 31.3* 32.1 30.8* 31.6   RDW 20.5* 20.8* 20.6* 20.3*    200 167 158     INR  Recent Labs   Lab 03/10/23  0603 03/09/23  0134 03/08/23  0424 03/07/23  0421 03/06/23  0347   INR 1.11 1.19* 1.15 1.10 1.25*   PTT  --   --   --  30 24     ABG  Recent Labs   Lab 03/06/23  0003 03/05/23  1036   O2PER 21 21      Urine Studies  Recent Labs   Lab Test 02/27/23  1616 02/24/23  1818 02/22/23  1421 02/19/23  2051   COLOR Yellow Light Yellow Yellow Yellow   APPEARANCE Clear Clear Clear Clear   URINEGLC Negative Negative Negative Negative   URINEBILI Negative Negative Negative Negative   URINEKETONE Negative Negative Negative Negative   SG 1.012 1.009 1.010 1.015   UBLD Negative Negative Negative Negative   URINEPH 5.5 5.0 5.0 5.0   PROTEIN 30* Negative Negative 30*   NITRITE Negative Negative Negative Negative   LEUKEST Negative Negative Negative Negative   RBCU <1 <1 1 26*   WBCU 2 1 2 16*     No lab results found.  PTH  No lab results found.  Iron Studies  Recent Labs   Lab Test 02/22/23  0610 02/20/23  0730 02/16/23  0543 12/20/22  0703 10/06/22  0958 04/07/22  0624   IRON 26*  --   --  249*  --  85   *  --   --   --   --   --    IRONSAT 19  --   --   --   --   --    ASCENCION 1,465* 1,335* 1,725* 2,207* 2,042* 423*       IMAGING:  All imaging studies reviewed by me.

## 2023-03-12 NOTE — PROGRESS NOTES
HEMODIALYSIS TREATMENT NOTE    Date: 3/12/2023  Time:420 PM    Data:  Pre Wt:   72.8kg  Desired Wt:70.8   kg   Post Wt:  70.8kg  Weight change:-2   kg  Ultrafiltration - Post Run Net Total Removed (mL): 2000 mL  Vascular Access Status: patent  Dialyzer Rinse: Streaked  Total Blood Volume Processed: 51.2L Liters  Total Dialysis (Treatment) Time: 3 Hours    Lab:        Interventions:  3 hours of HD with 2000 mls pulled per order. No complications. Patient slept through the run    Assessment:  ASHISH/HRS patient in for HD run 2/2 volume overload and chemistries.      Plan:    Per renal transplant team.

## 2023-03-13 ENCOUNTER — HOSPITAL ENCOUNTER (INPATIENT)
Facility: CLINIC | Age: 28
End: 2023-03-13
Payer: COMMERCIAL

## 2023-03-13 LAB
ALBUMIN SERPL BCG-MCNC: 3.1 G/DL (ref 3.5–5.2)
ALP SERPL-CCNC: 312 U/L (ref 40–129)
ALT SERPL W P-5'-P-CCNC: 24 U/L (ref 10–50)
ANION GAP SERPL CALCULATED.3IONS-SCNC: 7 MMOL/L (ref 7–15)
AST SERPL W P-5'-P-CCNC: 19 U/L (ref 10–50)
BACTERIA UR CULT: NO GROWTH
BILIRUB DIRECT SERPL-MCNC: 1.03 MG/DL (ref 0–0.3)
BILIRUB SERPL-MCNC: 1.5 MG/DL
BUN SERPL-MCNC: 47.6 MG/DL (ref 6–20)
CALCIUM SERPL-MCNC: 9 MG/DL (ref 8.6–10)
CHLORIDE SERPL-SCNC: 92 MMOL/L (ref 98–107)
CREAT SERPL-MCNC: 2.41 MG/DL (ref 0.67–1.17)
DEPRECATED HCO3 PLAS-SCNC: 27 MMOL/L (ref 22–29)
ERYTHROCYTE [DISTWIDTH] IN BLOOD BY AUTOMATED COUNT: 20.5 % (ref 10–15)
GFR SERPL CREATININE-BSD FRML MDRD: 36 ML/MIN/1.73M2
GLUCOSE BLDC GLUCOMTR-MCNC: 108 MG/DL (ref 70–99)
GLUCOSE BLDC GLUCOMTR-MCNC: 192 MG/DL (ref 70–99)
GLUCOSE BLDC GLUCOMTR-MCNC: 217 MG/DL (ref 70–99)
GLUCOSE BLDC GLUCOMTR-MCNC: 291 MG/DL (ref 70–99)
GLUCOSE BLDC GLUCOMTR-MCNC: 67 MG/DL (ref 70–99)
GLUCOSE BLDC GLUCOMTR-MCNC: 74 MG/DL (ref 70–99)
GLUCOSE SERPL-MCNC: 236 MG/DL (ref 70–99)
HCT VFR BLD AUTO: 24.6 % (ref 40–53)
HGB BLD-MCNC: 7.6 G/DL (ref 13.3–17.7)
MAGNESIUM SERPL-MCNC: 1.8 MG/DL (ref 1.7–2.3)
MCH RBC QN AUTO: 31.7 PG (ref 26.5–33)
MCHC RBC AUTO-ENTMCNC: 30.9 G/DL (ref 31.5–36.5)
MCV RBC AUTO: 103 FL (ref 78–100)
PHOSPHATE SERPL-MCNC: 3.4 MG/DL (ref 2.5–4.5)
PLATELET # BLD AUTO: 198 10E3/UL (ref 150–450)
POTASSIUM SERPL-SCNC: 5.2 MMOL/L (ref 3.4–5.3)
PROT SERPL-MCNC: 5.5 G/DL (ref 6.4–8.3)
RBC # BLD AUTO: 2.4 10E6/UL (ref 4.4–5.9)
SODIUM SERPL-SCNC: 126 MMOL/L (ref 136–145)
TACROLIMUS BLD-MCNC: 11.7 UG/L (ref 5–15)
TME LAST DOSE: NORMAL H
TME LAST DOSE: NORMAL H
WBC # BLD AUTO: 8.1 10E3/UL (ref 4–11)

## 2023-03-13 PROCEDURE — 250N000011 HC RX IP 250 OP 636: Performed by: PHYSICIAN ASSISTANT

## 2023-03-13 PROCEDURE — 250N000013 HC RX MED GY IP 250 OP 250 PS 637: Performed by: STUDENT IN AN ORGANIZED HEALTH CARE EDUCATION/TRAINING PROGRAM

## 2023-03-13 PROCEDURE — 258N000003 HC RX IP 258 OP 636

## 2023-03-13 PROCEDURE — 250N000012 HC RX MED GY IP 250 OP 636 PS 637: Performed by: PHYSICIAN ASSISTANT

## 2023-03-13 PROCEDURE — 250N000011 HC RX IP 250 OP 636

## 2023-03-13 PROCEDURE — 84100 ASSAY OF PHOSPHORUS: CPT | Performed by: PHYSICIAN ASSISTANT

## 2023-03-13 PROCEDURE — 258N000003 HC RX IP 258 OP 636: Performed by: PHYSICIAN ASSISTANT

## 2023-03-13 PROCEDURE — 82248 BILIRUBIN DIRECT: CPT | Performed by: PHYSICIAN ASSISTANT

## 2023-03-13 PROCEDURE — 250N000011 HC RX IP 250 OP 636: Performed by: INTERNAL MEDICINE

## 2023-03-13 PROCEDURE — 250N000013 HC RX MED GY IP 250 OP 250 PS 637: Performed by: PHYSICIAN ASSISTANT

## 2023-03-13 PROCEDURE — 80053 COMPREHEN METABOLIC PANEL: CPT | Performed by: PHYSICIAN ASSISTANT

## 2023-03-13 PROCEDURE — 90937 HEMODIALYSIS REPEATED EVAL: CPT

## 2023-03-13 PROCEDURE — 250N000013 HC RX MED GY IP 250 OP 250 PS 637: Performed by: INTERNAL MEDICINE

## 2023-03-13 PROCEDURE — 85027 COMPLETE CBC AUTOMATED: CPT | Performed by: PHYSICIAN ASSISTANT

## 2023-03-13 PROCEDURE — 99233 SBSQ HOSP IP/OBS HIGH 50: CPT | Mod: 24

## 2023-03-13 PROCEDURE — 120N000011 HC R&B TRANSPLANT UMMC

## 2023-03-13 PROCEDURE — P9045 ALBUMIN (HUMAN), 5%, 250 ML: HCPCS

## 2023-03-13 PROCEDURE — 83735 ASSAY OF MAGNESIUM: CPT | Performed by: PHYSICIAN ASSISTANT

## 2023-03-13 PROCEDURE — 99232 SBSQ HOSP IP/OBS MODERATE 35: CPT | Mod: FS | Performed by: TRANSPLANT SURGERY

## 2023-03-13 PROCEDURE — 36592 COLLECT BLOOD FROM PICC: CPT | Performed by: PHYSICIAN ASSISTANT

## 2023-03-13 PROCEDURE — 80197 ASSAY OF TACROLIMUS: CPT | Performed by: PHYSICIAN ASSISTANT

## 2023-03-13 PROCEDURE — 250N000013 HC RX MED GY IP 250 OP 250 PS 637: Performed by: SURGERY

## 2023-03-13 RX ORDER — GUAR GUM
1 PACKET (EA) ORAL 3 TIMES DAILY
Status: DISCONTINUED | OUTPATIENT
Start: 2023-03-13 | End: 2023-03-17

## 2023-03-13 RX ORDER — ALBUMIN (HUMAN) 12.5 G/50ML
50 SOLUTION INTRAVENOUS
Status: DISCONTINUED | OUTPATIENT
Start: 2023-03-13 | End: 2023-03-13

## 2023-03-13 RX ADMIN — URSODIOL 300 MG: 300 CAPSULE ORAL at 08:37

## 2023-03-13 RX ADMIN — ACETAMINOPHEN 650 MG: 325 TABLET ORAL at 23:32

## 2023-03-13 RX ADMIN — Medication 1 PACKET: at 20:24

## 2023-03-13 RX ADMIN — TACROLIMUS 3 MG: 1 CAPSULE ORAL at 08:37

## 2023-03-13 RX ADMIN — B-COMPLEX W/ C & FOLIC ACID TAB 1 MG 1 TABLET: 1 TAB at 08:37

## 2023-03-13 RX ADMIN — OXYCODONE HYDROCHLORIDE 5 MG: 5 TABLET ORAL at 19:40

## 2023-03-13 RX ADMIN — SULFAMETHOXAZOLE AND TRIMETHOPRIM 1 TABLET: 400; 80 TABLET ORAL at 20:24

## 2023-03-13 RX ADMIN — Medication 40 MG: at 23:34

## 2023-03-13 RX ADMIN — ONDANSETRON 4 MG: 4 TABLET, ORALLY DISINTEGRATING ORAL at 07:02

## 2023-03-13 RX ADMIN — Medication 5 MG: at 23:32

## 2023-03-13 RX ADMIN — MAGNESIUM OXIDE TAB 400 MG (241.3 MG ELEMENTAL MG) 400 MG: 400 (241.3 MG) TAB at 17:47

## 2023-03-13 RX ADMIN — Medication: at 13:21

## 2023-03-13 RX ADMIN — OXYCODONE HYDROCHLORIDE 5 MG: 5 TABLET ORAL at 00:42

## 2023-03-13 RX ADMIN — ALBUMIN (HUMAN) 250 ML: 12.5 SOLUTION INTRAVENOUS at 13:17

## 2023-03-13 RX ADMIN — ONDANSETRON 4 MG: 4 TABLET, ORALLY DISINTEGRATING ORAL at 19:40

## 2023-03-13 RX ADMIN — MYCOPHENOLIC ACID 360 MG: 360 TABLET, DELAYED RELEASE ORAL at 17:46

## 2023-03-13 RX ADMIN — VALGANCICLOVIR 450 MG: 450 TABLET, FILM COATED ORAL at 18:09

## 2023-03-13 RX ADMIN — ACETAMINOPHEN 650 MG: 325 TABLET ORAL at 00:42

## 2023-03-13 RX ADMIN — FLUOXETINE HYDROCHLORIDE 60 MG: 40 CAPSULE ORAL at 23:33

## 2023-03-13 RX ADMIN — OXYCODONE HYDROCHLORIDE 5 MG: 5 TABLET ORAL at 13:28

## 2023-03-13 RX ADMIN — SODIUM CHLORIDE 300 ML: 9 INJECTION, SOLUTION INTRAVENOUS at 13:15

## 2023-03-13 RX ADMIN — HEPARIN SODIUM 5000 UNITS: 5000 INJECTION, SOLUTION INTRAVENOUS; SUBCUTANEOUS at 01:54

## 2023-03-13 RX ADMIN — OLANZAPINE 5 MG: 5 TABLET, ORALLY DISINTEGRATING ORAL at 23:34

## 2023-03-13 RX ADMIN — HEPARIN SODIUM 5000 UNITS: 5000 INJECTION, SOLUTION INTRAVENOUS; SUBCUTANEOUS at 17:47

## 2023-03-13 RX ADMIN — URSODIOL 300 MG: 300 CAPSULE ORAL at 19:40

## 2023-03-13 RX ADMIN — ONDANSETRON 4 MG: 4 TABLET, ORALLY DISINTEGRATING ORAL at 13:28

## 2023-03-13 RX ADMIN — ASPIRIN 325 MG ORAL TABLET 325 MG: 325 PILL ORAL at 08:37

## 2023-03-13 RX ADMIN — PREDNISONE 5 MG: 5 TABLET ORAL at 08:37

## 2023-03-13 RX ADMIN — MICAFUNGIN 100 MG: 10 INJECTION, POWDER, LYOPHILIZED, FOR SOLUTION INTRAVENOUS at 08:36

## 2023-03-13 RX ADMIN — Medication 25 MCG: at 08:37

## 2023-03-13 RX ADMIN — TACROLIMUS 3 MG: 1 CAPSULE ORAL at 17:46

## 2023-03-13 RX ADMIN — MYCOPHENOLIC ACID 360 MG: 360 TABLET, DELAYED RELEASE ORAL at 08:37

## 2023-03-13 RX ADMIN — ONDANSETRON 4 MG: 4 TABLET, ORALLY DISINTEGRATING ORAL at 23:32

## 2023-03-13 RX ADMIN — OXYCODONE HYDROCHLORIDE 5 MG: 5 TABLET ORAL at 23:34

## 2023-03-13 RX ADMIN — OXYCODONE HYDROCHLORIDE 5 MG: 5 TABLET ORAL at 07:02

## 2023-03-13 RX ADMIN — Medication 25 MCG: at 08:57

## 2023-03-13 RX ADMIN — Medication 40 MG: at 08:36

## 2023-03-13 ASSESSMENT — ACTIVITIES OF DAILY LIVING (ADL)
ADLS_ACUITY_SCORE: 41
ADLS_ACUITY_SCORE: 41
ADLS_ACUITY_SCORE: 39
ADLS_ACUITY_SCORE: 41
ADLS_ACUITY_SCORE: 39
ADLS_ACUITY_SCORE: 39
ADLS_ACUITY_SCORE: 41
ADLS_ACUITY_SCORE: 39
ADLS_ACUITY_SCORE: 41
ADLS_ACUITY_SCORE: 39

## 2023-03-13 NOTE — PROGRESS NOTES
CLINICAL NUTRITION SERVICES - BRIEF NOTE   (See RD note on 3/9 for full assessment)     Reason for RD note: Following up on appetite, tolerance to TF    New Findings/Chart Review:  Nutrition support: Osmolite 1.5 @ 65 ml/hr x 18 hours + 2 pkts Prosource TF 20 provides 1170 mL daily, 1915 kcal, 114 g protein, 239 g CHO, 892 mL free H20, 0 g fiber     Enteral Access: 30 mL q4h    Oral Diet/Intake: Regular + kcal cts 3/11-3/13    Labs: K+ 5.2 (WNL - uptrending)     Meds: Reviewed, notable for: 20 units Lantus BID    Interventions:  Discussed pt in rounds with primary team - plan to transition to TF regimen below and team aware to adjust insulin as indicated.    Given rising K+, transition to lower-K+ formula and reduce to shorter cycle to stimulate appetite:  Novasource Renal (or equivalent) @ 50 ml/hr x 12 hrs, 8pm-8am (600 ml) + 1 pkts ProSource TF20 TID (3 pkts total) provides 1440 kcal (22 kcal/kg, or 73% minimum assessed kcal needs), 114 g pro (1.7 g/kg), 110 g CHO, 430 ml free water, and 0 g fiber daily.   --> Provides roughly half the CHO of current TF regimen    Given rising K+, transitioning from Banatrol to lower-K+ NS Fiber 1 pkt TID (9 g soluble fiber, 12 g CHO daily)    Future/Additional Recommendations:  Monitor tolerance to new EN support    Recommend pt on average meets at least 60% minimum assessed needs (~1200 kcal, 60 g pro daily) via kcal cts prior to removal of FT and discontinuation of EN support.     Nutrition will continue to follow per protocol.    Karlee Sim, FABIOLA, LD  Pager: [phone]

## 2023-03-13 NOTE — PROGRESS NOTES
Immunosuppression Management Note:    Dillon Salter is a 27 year old male who is seen today  for immunosuppression management     I, John Rondon MD, I have examined the patient with our CHRISTA/Fellow as part of a shared visit.   I participated in the rounds,  discussed and agree with the note and findings and  reviewed today's vital signs, medications, labs and imaging as noted in this note.  I  reviewed the  immunosuppression medications.  I personally provided a substantive portion of the care of this patient. I personally performed the immunosuppressive management of this patient, reviewed the overall  immunosuppression including drug levels, allograft function and provided the recommendations to adjust the dose to provide optimal levels to prevent rejection of the allograft and prevent toxicity to the organs. This was complex care due to the fresh allograft.   Time spent: evaluating patient, examining patient, discussion of plan, counseling and documentation: >35 min   I spoke to the patient/family and explained below clinical details and answered all the questions    Transplant Surgery  Inpatient Daily Progress Note  2023    Assessment & Plan: Dillon Salter is a 27 year old male with a past medical history of Asperger's, DM2, and alcoholic cirrhosis c/b esophageal varices and hepatic encephalopathy. He is now s/p  donor liver transplant without stent on 23 with Dr. Sterling. Return to OR for washout and repair of arterial bleeding on 3/1/23.    s/p DDLT 23: POD #13. Elevated liver studies with Total bilirubin now normalizing again: TB 1.5 today. Alk phosphatase 300s. -Abdominal US 3/7 with patent vasculature.   - Continue ASA 325mg daily  - Continue ursodiol 300mg BID    Imaging:  -3/1 US: resolution of hematoma, patent vessels  -3/3 US: Patent, resolution of perihepatic hematoma with new perihepatic hematoma up to 9 cm.  -3/7 US with good flow.   -3/10 MRCP:   1. Liver  transplant. Abrupt caliber change in the common bile duct  which may be related to anastomosis. There is narrowing of the common  hepatic duct and central intrahepatic ducts. ERCP may be necessary to  investigate this further. Mild periportal edema. Portal and hepatic  veins and IVC all appear normal.  2. Multiple perihepatic fluid collections.  3. Right pleural effusion and associated lung base atelectasis.  4. Gallbladder absent corresponding with liver transplant.    Immunosuppression management:  Induction: Steroid taper and basiliximab x2 per renal sparing protocol.  Maintenance:   - mg BID --> reduced to 500mg BID in setting of diarrhea. Changed to Myfortic 360 mg BID 3/8.   -Tacrolimus: 3mg BID; goal level 8-10  -Prednisone 5mg daily    Neuro/Psych:  Fall: Mechanical fall with head trauma (but no LOC) on 3/9 AM in the setting of frailty and opioid use. CTH without bleeding. Fall precautions.   Acute post op pain in the setting of chronic musculoskeletal + neuropathic pain pain: On oxycodone PTA for neuropathy?. Previously had tried gabapentin without benefit Continue Oxycodone  5mg Q4 PRN along with Tylenol 650mg Q8 PRN, minimize narcotic use due to sedation and fall.  * Pain consult placed for non-opioid management of chronic + neuropathic pain; no indication for chronic opioid use - recommended topical and taper of Oxycodone.   MDD, Anxiety, Autism spectrum: PTA fluoxetine. Zyprexa Zydis 5 mg HS and 2.5 mg BID PRN anxiety or hallucinations  Acute delirium: Post operatively had ICU delirium with hallucinations. Continue to minimize narcotics as able.  Zyprexa as above-discontinue after resolution of delirium    Hematology:   Anemia of chronic disease/Acute blood loss: Hgb stable ~7-9 without overt s/sx of blood loss.   Thrombocytopenia: PLT improving,~200    Cardiorespiratory:   Post op ventilatory support/hypercapnia/acute pulmonary edema: Extubated 3/3. Lethargic and hypercapnic 3/4 AM, improved  with BiPAP. Now stable on ambient air.   Hypovolemic/hemorrhagic shock: Secondary to bleeding. Resolved.   Tachycardia: Stable mild tachycardia     GI/Nutrition:   Severe malnutrition in the context of acute illness: Nutrition consulted. FT in place, receiving Novasource Renal -cycled with goal 65ml/hr . Regular diet. D/w RD, plan to change EN due to Osmolite 1.5 @ 65/hr x18 +2 pkts Prosource.   - continued calorie counts given not at goal at this time.   Diarrhea: May be related to tube feeding vs cellcept. Changed to Myfortic.  Cdiff neg, On Fiber TID.   Nausea: possibly related to EN. Will change EN.     Endocrine:   DM2; Steroid induced hyperglycemia: initially managed with insulin gtt,transitioned to sliding scale insulin. Now on Lantus 20 units BID (home dosing),  +sliding scale insulin PRN.       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 CRRT started 3/1/23. Transitioned to iHD 3/4. Tunneled line placed by IR on 3/10. HD per nephrology.     : Negron removed. Periodic bladder scans.    Prophylaxis: DVT (mechanical), fall, GI (PPI), fungal (Initially received fluconazole, changed to Micafungin d/t CRRT/re-operation), viral (Valcyte), pneumocystis (Bactrim -restarted 3/6), jalen-op (Zosyn)    Disposition: 7A    CHRISTA/Fellow/Resident Provider: Julieth Leblanc, NP [id number]     Faculty: John Rondon M.D.      __________________________________________________________________  Transplant History:    2/28/2023 (Liver), Postoperative day: 13     Interval History:  No acute events overnight. No fevers or chill    No appetite but increasing oral intake.     ROS:   A 10-point review of systems was negative except as noted above.    Curent Meds:   aspirin  325 mg Per Feeding Tube Daily    cholecalciferol  25 mcg Per Feeding Tube Daily    fiber modular  1 packet Per Feeding Tube TID    FLUoxetine  60 mg Oral At  Bedtime    heparin ANTICOAGULANT  5,000 Units Subcutaneous Q12H    insulin aspart   Subcutaneous TID w/meals    insulin glargine  20 Units Subcutaneous BID    magnesium oxide  400 mg Oral Q24H    melatonin  5 mg Oral QPM    micafungin  100 mg Intravenous Q24H    multivitamin RENAL  1 tablet Oral Daily    mycophenolic acid  360 mg Oral BID IS    OLANZapine zydis  5 mg Oral At Bedtime    pantoprazole  40 mg Per Feeding Tube BID    predniSONE  5 mg Oral Daily    protein modular  1 packet Per Feeding Tube BID    sodium chloride (PF)  3 mL Intravenous Q8H    sulfamethoxazole-trimethoprim  1 tablet Oral or Feeding Tube Once per day on Mon Wed Fri    tacrolimus  3 mg Oral BID IS    ursodiol  300 mg Oral BID    valGANciclovir  450 mg Oral Once per day on Mon Fri    cholecalciferol  25 mcg Oral or Feeding Tube Daily       Physical Exam:     Admit Weight: 65.9 kg (145 lb 4.5 oz)    Current Vitals:   BP (!) 152/93 (BP Location: Left arm)   Pulse 98   Temp 98.5  F (36.9  C) (Oral)   Resp 16   Wt 72.8 kg (160 lb 6.4 oz)   SpO2 100%   BMI 26.69 kg/m      Vital sign ranges:    Temp:  [98.1  F (36.7  C)-99  F (37.2  C)] 98.5  F (36.9  C)  Pulse:  [] 98  Resp:  [5-18] 16  BP: (110-152)/(68-97) 152/93  SpO2:  [98 %-100 %] 100 %    General Appearance: No acute distress  Skin: warm, dry  Heart: RRR  Lungs: Breathing comfortably on ambient  Abdomen: abdomen soft, non-distended. Incision c/d/i. Rash above incision site without overlying erythema or purulence  Extremities: edema: 1+ Mahendra edema bilaterally  Skin: No jaundice.   Neurologic: Awake, alert, oriented.     Data:   CMP  Recent Labs   Lab 03/13/23  0649 03/13/23  0408 03/12/23  0810 03/12/23  0721   *  --   --  127*   POTASSIUM 5.2  --   --  5.0   CHLORIDE 92*  --   --  92*   CO2 27  --   --  24   * 217*   < > 203*   BUN 47.6*  --   --  54.9*   CR 2.41*  --   --  2.52*   GFRESTIMATED 36*  --   --  34*   SARTHAK 9.0  --   --  9.6   MAG 1.8  --   --  1.9   PHOS  3.4  --   --  3.3   ALBUMIN 3.1*  --   --  3.4*   BILITOTAL 1.5*  --   --  2.2*   ALKPHOS 312*  --   --  344*   AST 19  --   --  21   ALT 24  --   --  33    < > = values in this interval not displayed.     CBC  Recent Labs   Lab 03/13/23  0649 03/12/23  0721 03/12/23  0006   HGB 7.6* 8.8*  --    WBC 8.1 8.1  --     219  --    A1C  --   --  5.2

## 2023-03-13 NOTE — PROGRESS NOTES
[facility]   Transplant Nephrology Progress Note  Date of Admission:  2/11/2023  Today's Date: 03/13/2023    Recommendations:  - HD today with 3L UF over 4 hours.    Assessment & Plan   # ASHISH: Increased.  Unclear urine output with some unmeasured voids as part of watery stools., but likely anuric/oliguric.     - ASHISH likely secondary to hemorrhagic shock and hemodynamic changes following liver transplant, as well as mild HRS prior to transplant.  - HD Info  Access: Right IJ Tunneled Catheter (placed 3/12), Days: MWF, Length: 4.0 hrs, EDW: 66.0 kg, Heparin: No, MARISEL: No, IV Iron: No, Vit D analog: No   - Baseline Creatinine: ~ 0.6-0.8   - Proteinuria: Normal (<0.2 grams)    # Liver Tx: ESLD secondary to alcoholic cirrhosis, s/p OLT 2/28/23.  Trend down in transaminases, but increased total bilirubin.  Followed by Transplant Surgery.    # Immunosuppression: Tacrolimus immediate release (goal 8-12), Mycophenolic acid (dose 360 mg every 12 hours) and Prednisone (dose 5 mg daily)   - Induction with Recent Transplant:  Per Liver Tx protocol.   - Changes: Not at this time; Management per Transplant Surgery.    # Infection Prophylaxis:   - PJP: Sulfa/TMP (Bactrim)  - CMV: Valganciclovir (Valcyte); CMV IgG Ab recipient and donor negative  - Thrush: Micafungin (Mycamine)  - Fungal: Micafungin (Mycamine)    # Blood Pressure: Borderline control;  Goal BP: < 140/90 (Hospitalization goal)   - Volume status: Mildly hypervolemic  EDW ~ 66.0 kg   - Changes: Not at this time     # Diabetes: Controlled (HbA1c <7%) Last HbA1c: 6.1%   - Management as per primary team.    # Anemia in Chronic Disease: Hgb: Trend down      MARISEL: No   - Iron studies: Low iron saturation, but high ferritin (2/22/23)    # Mineral Bone Disorder:   - Vitamin D; level: Low (12/20/22)        On supplement: Yes, cholecalciferol 25 mcg PO daily.  - Calcium; level: Normal        On supplement: No  - Phosphorus; level:  Normal        On binder: No    # Electrolytes:   - Potassium; level: High normal        On supplement: No  - Magnesium; level: Normal        On supplement: Yes, magnesium oxide 400 mg PO QD  - Bicarbonate; level: Normal        On supplement: No    # Malnutrition: Patient on tube feedings.    # Diarrhea: Some loose stools.  Now started on fiber.  C. diff negative 3/5.    # EBV IgG Ab Discordance (D+/R-): Will do surveillance EBV PCR qmonth until 12 months post transplant, then q3 months until 2 years post transplant.    # Aspergers/Depression/Hallucinations: Patient appears to be having less hallucinations per his mother.  Seen by Psychiatry and started on olanzapine, as well as continuing on fluoxetine.    # Transplant History:  Etiology of Organ Failure: Alcoholic cirrhosis  Tx: Liver Tx  Transplant: 2/28/2023 (Liver)  Donor Type:  Donor Class:   Crossmatch at time of Tx: negative  DSA at time of Tx: No   Significant changes in immunosuppression: Lower CNI goal due to ASHISH  Significant transplant-related complications: ASHISH    Recommendations were communicated to the primary team via this note.    Patient dicussed with VAMSHI Terry CNP   Pager: [phone]    Physician Attestation     I saw and evaluated Dillon Salter as part of a shared APRN/PA visit.     I personally reviewed the vital signs, medications and labs.    I personally performed the substantive portion of the medical decision making for this visit - please see the CHRISTA's documentation for full details.    Key management decisions made by me and carried out under my direction: Will plan HD today for volume removal and continue to bring overall weight closer to EDW.    Antonio Aguila MD  Date of Service (when I saw the patient): 03/13/23    Interval History   Mr. Salter's creatinine is 2.41 (03/13 0649); Decreased will dialyze today.   Unmeasured urine output. Patient reports he is making a good amount of urine.  Normal  transaminases.  Total bilirubin trending down.  Other significant labs/tests/vitals: Stable electrolytes.   No new events overnight.  No SOB on room air.  No chest pain.  No N/V.  Reports loose stools.  Pt reports abdominal pain manageable.  Leg edema noted.     Review of Systems   4 point ROS was obtained and negative except as noted in the Interval History.    MEDICATIONS:    aspirin  325 mg Per Feeding Tube Daily     fiber modular (NUTRISOURCE FIBER)  1 packet Per Feeding Tube TID     FLUoxetine  60 mg Oral At Bedtime     sodium chloride (PF) 0.9%  10 mL Intracatheter Once in dialysis/CRRT    Followed by     heparin  1.3-2.6 mL Intracatheter Once in dialysis/CRRT     sodium chloride (PF) 0.9%  10 mL Intracatheter Once in dialysis/CRRT    Followed by     heparin  1.3-2.6 mL Intracatheter Once in dialysis/CRRT     heparin ANTICOAGULANT  5,000 Units Subcutaneous Q12H     insulin aspart   Subcutaneous TID w/meals     insulin glargine  20 Units Subcutaneous BID     magnesium oxide  400 mg Oral Q24H     melatonin  5 mg Oral QPM     micafungin  100 mg Intravenous Q24H     multivitamin RENAL  1 tablet Oral Daily     mycophenolic acid  360 mg Oral BID IS     OLANZapine zydis  5 mg Oral At Bedtime     pantoprazole  40 mg Per Feeding Tube BID     predniSONE  5 mg Oral Daily     protein modular  1 packet Per Feeding Tube BID     sodium chloride (PF)  3 mL Intravenous Q8H     sodium chloride (PF)  9 mL Intracatheter During Dialysis/CRRT (from stock)     sodium chloride (PF)  9 mL Intracatheter During Dialysis/CRRT (from stock)     sulfamethoxazole-trimethoprim  1 tablet Oral or Feeding Tube Once per day on      tacrolimus  3 mg Oral BID IS     ursodiol  300 mg Oral BID     valGANciclovir  450 mg Oral Once per day on      cholecalciferol  25 mcg Oral or Feeding Tube Daily       dextrose Stopped (23 1450)     dextrose 1,000 mL (03/10/23 5402)       Physical Exam   Temp  Av.6  F (36.4  C)  Min: 92.8   F (33.8  C)  Max: 99.5  F (37.5  C)  Arterial Line BP  Min: 78/56  Max: 173/78  Arterial Line MAP (mmHg)  Av.4 mmHg  Min: 56 mmHg  Max: 190 mmHg      Pulse  Av.4  Min: 62  Max: 112 Resp  Avg: 15.7  Min: 8  Max: 34  FiO2 (%)  Av.5 %  Min: 30 %  Max: 100 %  SpO2  Av.6 %  Min: 84 %  Max: 100 %    CVP (mmHg): 8 mmHgBP 132/77   Pulse 103   Temp 98.4  F (36.9  C) (Oral)   Resp 16   Wt 72.4 kg (159 lb 9.6 oz)   SpO2 99%   BMI 26.56 kg/m     Date 23 07 - 23 0659   Shift 8577-0869 6611-2984 8389-8784 24 Hour Total   INTAKE   I.V. 156   156   NG/GT 80   80   Enteral 160   160   Shift Total(mL/kg) 396(5.04)   396(5.04)   OUTPUT   Shift Total(mL/kg)       Weight (kg) 78.5 78.5 78.5 78.5      Admit Weight: 65.9 kg (145 lb 4.5 oz)     GENERAL APPEARANCE: Lethargic.  HENT: mouth without ulcers or lesions, NJ in place  RESP: lungs clear to auscultation - no rales, rhonchi or wheezes  CV: regular rhythm, normal rate, no rub, no murmur  EDEMA: 2+ LE and dependent edema bilaterally  ABDOMEN: soft, nondistended, mild TTP, bowel sounds normal  MS: extremities normal - no gross deformities noted, no evidence of inflammation in joints, no muscle tenderness  SKIN: no rash  DIALYSIS ACCESS:  Right IJ tunneled catheter     Data   All labs reviewed by me.  CMP  Recent Labs   Lab 23  1049 23  0649 23  0408 23  0050 23  0810 23  0721 23  0714 23  0651 03/10/23  0753 03/10/23  0603   NA  --  126*  --   --   --  127*  --  128*  --  131*   POTASSIUM  --  5.2  --   --   --  5.0  --  4.4  --  4.4   CHLORIDE  --  92*  --   --   --  92*  --  94*  --  96*   CO2  --  27  --   --   --  24  --  25  --  26   ANIONGAP  --  7  --   --   --  11  --  9  --  9   * 236* 217* 291*   < > 203*   < > 373*   < > 191*   BUN  --  47.6*  --   --   --  54.9*  --  30.2*  --  36.2*   CR  --  2.41*  --   --   --  2.52*  --  1.65*  --  1.75*   GFRESTIMATED  --  36*  --   --   --   34*  --  57*  --  53*   SARTHAK  --  9.0  --   --   --  9.6  --  8.6  --  9.0   MAG  --  1.8  --   --   --  1.9  --  2.1  --  1.8   PHOS  --  3.4  --   --   --  3.3  --  3.4  --  3.7   PROTTOTAL  --  5.5*  --   --   --  5.9*  --  5.6*  --  5.4*   ALBUMIN  --  3.1*  --   --   --  3.4*  --  3.3*  --  3.2*   BILITOTAL  --  1.5*  --   --   --  2.2*  --  2.6*  --  3.6*   ALKPHOS  --  312*  --   --   --  344*  --  325*  --  322*   AST  --  19  --   --   --  21  --  22  --  26   ALT  --  24  --   --   --  33  --  48  --  76*    < > = values in this interval not displayed.     CBC  Recent Labs   Lab 03/13/23  0649 03/12/23  0721 03/11/23  0651 03/10/23  0603   HGB 7.6* 8.8* 8.5* 8.2*   WBC 8.1 8.1 6.5 6.1   RBC 2.40* 2.76* 2.63* 2.61*   HCT 24.6* 28.1* 26.5* 26.6*   * 102* 101* 102*   MCH 31.7 31.9 32.3 31.4   MCHC 30.9* 31.3* 32.1 30.8*   RDW 20.5* 20.5* 20.8* 20.6*    219 200 167     INR  Recent Labs   Lab 03/10/23  0603 03/09/23  0134 03/08/23  0424 03/07/23  0421   INR 1.11 1.19* 1.15 1.10   PTT  --   --   --  30     ABG  No lab results found in last 7 days.   Urine Studies  Recent Labs   Lab Test 03/12/23  1134 02/27/23  1616 02/24/23  1818 02/22/23  1421   COLOR Dark Yellow* Yellow Light Yellow Yellow   APPEARANCE Cloudy* Clear Clear Clear   URINEGLC 30* Negative Negative Negative   URINEBILI Small* Negative Negative Negative   URINEKETONE Negative Negative Negative Negative   SG 1.027 1.012 1.009 1.010   UBLD Moderate* Negative Negative Negative   URINEPH 5.0 5.5 5.0 5.0   PROTEIN 100* 30* Negative Negative   NITRITE Negative Negative Negative Negative   LEUKEST Small* Negative Negative Negative   RBCU 20* <1 <1 1   WBCU 112* 2 1 2     No lab results found.  PTH  No lab results found.  Iron Studies  Recent Labs   Lab Test 02/22/23  0610 02/20/23  0730 02/16/23  0543 12/20/22  0703 10/06/22  0958 04/07/22  0624   IRON 26*  --   --  249*  --  85   *  --   --   --   --   --    IRONSAT 19  --   --   --    --   --    SACENCION 1,465* 1,335* 1,725* 2,207* 2,042* 423*       IMAGING:  All imaging studies reviewed by me.

## 2023-03-13 NOTE — PROGRESS NOTES
HEMODIALYSIS TREATMENT NOTE    Date: 3/13/2023  Time: 5:20 PM    Data:  Pre Wt:     Desired Wt:   kg   Post Wt:    Weight change:   kg  Ultrafiltration - Post Run Net Total Removed (mL): 3000 mL  Vascular Access Status: patent  Dialyzer Rinse: Light  Total Blood Volume Processed: 0 L Liters  Total Dialysis (Treatment) Time: 4 HRS Hours    Lab:   No    Interventions:  ZZofran and OX given per PT request    Assessment:  See flowsheet, MAR, and MD orders for further details     Plan:    Per renal

## 2023-03-13 NOTE — PROGRESS NOTES
Transplant Social Work Services Progress Note      Date of Initial Social Work Evaluation: 2022  Collaborated with:  ARU admissions    Data: Dillon underwent a  donor liver transplant on 23.   Intervention: Collaborated with  ARU admissions and medical team in rounds.  Patient can not be accepted to [facility] until on a set 3 day/week dialysis schedule.   He required an extra run yesterday.   Assessment: No new assessment.   Education provided by SW: N/A  Plan:    Discharge Plans in Progress: [facility] are following patient.    Barriers to d/c plan: Medical stability and regular 3 day per week dialysis schedule .      Follow up Plan: Sw to follow up with  ARU next week to inquire about bed availability.     Nara Anthony [facility]  Transplant   Phone: [phone] Pager: [phone]

## 2023-03-13 NOTE — PLAN OF CARE
Goal Outcome Evaluation:    Temp: 98.4  F (36.9  C) Temp src: Oral BP: (!) 134/90 Pulse: 103   Resp: 10 SpO2: 99 % O2 Device: None (Room air)    NEURO: A&Ox4, calm/cooperative, able to make needs known, neuropathy at baseline  RESPIRATORY: WNL on RA  CARDIAC: Mild HTN & tachycardia, not within notifying parameters, afebrile  GI/: Voids w/o difficulty via toilet, LBM 3/12, denies abd discomfort, NG to cycled TF - order changed today to new formula and running 1387-0786 @50mL/hr  SKIN: Clamshell incision w/ staples, scabbing rash surrounding incision improving, old JUANA sites x2 WNL  DIET: Regular, poor appetite, carb counts, BG checks q4hr, cycled TF @50mL/hr from 9353-1723  PAIN/NAUSEA: Denies  IV ACCESS: PIV SL, R-internal jugular for HD  ACTIVITY: Assist x1 w/ walker, bed alarm on, parents at bedside  LAB: Reviewed, QJk=213, 192 this shift  PLAN: Continue w/ POC, dialysis run this afternoon, continue w/ TF rate/formula change, SW following for discharge placement, likely discharge to  ARU pending med stabilization and regular 3x/wk dialysis schedule

## 2023-03-13 NOTE — PLAN OF CARE
Goal Outcome Evaluation:    /82 (BP Location: Left arm)   Pulse 111   Temp 98.1  F (36.7  C) (Oral)   Resp 16   Wt 72.8 kg (160 lb 6.4 oz)   SpO2 100%   BMI 26.69 kg/m      Shift: 6714-8277  VS: Vitals stable, afebrile  Neuro: Alert and oriented x4   BG: Q4 hr sugars 199, 291, 217  Labs: Awaiting AM labs   Respiratory: WDL  Cardiac: WDL  Pain/Nausea: prn oxy x3, zofran x2   Diet: Regular diet, nadja counts, TF at 65   IV Access: PIV x1, internal jugular   Lines/Drains: NG for TF   GI/: Voiding, last BM 3/12  Skin: Clamshell incision w/ staples scabbing/rash around incision, 2 old JUANA sites   Mobility: Assist x1 w/ walker   Plan: Continue with POC and notify team w/ any changes

## 2023-03-13 NOTE — PROGRESS NOTES
Calorie Count  Intake recorded for: 3/12  Total Kcals: 0 Total Protein: 0g  Kcals from Hospital Food: 0   Protein: 0g  Kcals from Outside Food (average):0 Protein: 0g  # Meals Ordered from Kitchen: 1 meal ordered  # Meals Recorded: 0 meals/supplements recorded

## 2023-03-14 ENCOUNTER — APPOINTMENT (OUTPATIENT)
Dept: PHYSICAL THERAPY | Facility: CLINIC | Age: 28
DRG: 005 | End: 2023-03-14
Attending: STUDENT IN AN ORGANIZED HEALTH CARE EDUCATION/TRAINING PROGRAM
Payer: COMMERCIAL

## 2023-03-14 LAB
ALBUMIN SERPL BCG-MCNC: 3.1 G/DL (ref 3.5–5.2)
ALP SERPL-CCNC: 312 U/L (ref 40–129)
ALT SERPL W P-5'-P-CCNC: 17 U/L (ref 10–50)
ANION GAP SERPL CALCULATED.3IONS-SCNC: 9 MMOL/L (ref 7–15)
AST SERPL W P-5'-P-CCNC: 22 U/L (ref 10–50)
BILIRUB DIRECT SERPL-MCNC: 0.85 MG/DL (ref 0–0.3)
BILIRUB SERPL-MCNC: 1.3 MG/DL
BUN SERPL-MCNC: 34.6 MG/DL (ref 6–20)
CALCIUM SERPL-MCNC: 9.2 MG/DL (ref 8.6–10)
CHLORIDE SERPL-SCNC: 92 MMOL/L (ref 98–107)
CREAT SERPL-MCNC: 2.04 MG/DL (ref 0.67–1.17)
DEPRECATED HCO3 PLAS-SCNC: 25 MMOL/L (ref 22–29)
ERYTHROCYTE [DISTWIDTH] IN BLOOD BY AUTOMATED COUNT: 20.5 % (ref 10–15)
GFR SERPL CREATININE-BSD FRML MDRD: 44 ML/MIN/1.73M2
GLUCOSE BLDC GLUCOMTR-MCNC: 122 MG/DL (ref 70–99)
GLUCOSE BLDC GLUCOMTR-MCNC: 136 MG/DL (ref 70–99)
GLUCOSE BLDC GLUCOMTR-MCNC: 136 MG/DL (ref 70–99)
GLUCOSE BLDC GLUCOMTR-MCNC: 177 MG/DL (ref 70–99)
GLUCOSE BLDC GLUCOMTR-MCNC: 43 MG/DL (ref 70–99)
GLUCOSE BLDC GLUCOMTR-MCNC: 66 MG/DL (ref 70–99)
GLUCOSE BLDC GLUCOMTR-MCNC: 94 MG/DL (ref 70–99)
GLUCOSE BLDC GLUCOMTR-MCNC: 97 MG/DL (ref 70–99)
GLUCOSE SERPL-MCNC: 121 MG/DL (ref 70–99)
HCT VFR BLD AUTO: 24.8 % (ref 40–53)
HGB BLD-MCNC: 7.6 G/DL (ref 13.3–17.7)
MAGNESIUM SERPL-MCNC: 1.7 MG/DL (ref 1.7–2.3)
MCH RBC QN AUTO: 32.1 PG (ref 26.5–33)
MCHC RBC AUTO-ENTMCNC: 30.6 G/DL (ref 31.5–36.5)
MCV RBC AUTO: 105 FL (ref 78–100)
PHOSPHATE SERPL-MCNC: 2.9 MG/DL (ref 2.5–4.5)
PLATELET # BLD AUTO: 213 10E3/UL (ref 150–450)
POTASSIUM SERPL-SCNC: 4 MMOL/L (ref 3.4–5.3)
PROT SERPL-MCNC: 5.5 G/DL (ref 6.4–8.3)
RBC # BLD AUTO: 2.37 10E6/UL (ref 4.4–5.9)
SODIUM SERPL-SCNC: 126 MMOL/L (ref 136–145)
WBC # BLD AUTO: 9.2 10E3/UL (ref 4–11)

## 2023-03-14 PROCEDURE — 250N000013 HC RX MED GY IP 250 OP 250 PS 637: Performed by: PHYSICIAN ASSISTANT

## 2023-03-14 PROCEDURE — 250N000011 HC RX IP 250 OP 636: Performed by: PHYSICIAN ASSISTANT

## 2023-03-14 PROCEDURE — 83735 ASSAY OF MAGNESIUM: CPT | Performed by: PHYSICIAN ASSISTANT

## 2023-03-14 PROCEDURE — 85027 COMPLETE CBC AUTOMATED: CPT | Performed by: PHYSICIAN ASSISTANT

## 2023-03-14 PROCEDURE — 250N000013 HC RX MED GY IP 250 OP 250 PS 637: Performed by: INTERNAL MEDICINE

## 2023-03-14 PROCEDURE — 99232 SBSQ HOSP IP/OBS MODERATE 35: CPT | Mod: FS | Performed by: TRANSPLANT SURGERY

## 2023-03-14 PROCEDURE — 36415 COLL VENOUS BLD VENIPUNCTURE: CPT | Performed by: PHYSICIAN ASSISTANT

## 2023-03-14 PROCEDURE — 999N000127 HC STATISTIC PERIPHERAL IV START W US GUIDANCE

## 2023-03-14 PROCEDURE — 250N000011 HC RX IP 250 OP 636: Performed by: INTERNAL MEDICINE

## 2023-03-14 PROCEDURE — 97116 GAIT TRAINING THERAPY: CPT | Mod: GP

## 2023-03-14 PROCEDURE — 97530 THERAPEUTIC ACTIVITIES: CPT | Mod: GP

## 2023-03-14 PROCEDURE — 250N000013 HC RX MED GY IP 250 OP 250 PS 637: Performed by: NURSE PRACTITIONER

## 2023-03-14 PROCEDURE — 82248 BILIRUBIN DIRECT: CPT | Performed by: PHYSICIAN ASSISTANT

## 2023-03-14 PROCEDURE — 97110 THERAPEUTIC EXERCISES: CPT | Mod: GP

## 2023-03-14 PROCEDURE — 99233 SBSQ HOSP IP/OBS HIGH 50: CPT | Mod: FS

## 2023-03-14 PROCEDURE — 120N000011 HC R&B TRANSPLANT UMMC

## 2023-03-14 PROCEDURE — 250N000012 HC RX MED GY IP 250 OP 636 PS 637: Performed by: PHYSICIAN ASSISTANT

## 2023-03-14 PROCEDURE — 258N000003 HC RX IP 258 OP 636: Performed by: PHYSICIAN ASSISTANT

## 2023-03-14 PROCEDURE — 80053 COMPREHEN METABOLIC PANEL: CPT | Performed by: PHYSICIAN ASSISTANT

## 2023-03-14 PROCEDURE — 84100 ASSAY OF PHOSPHORUS: CPT | Performed by: PHYSICIAN ASSISTANT

## 2023-03-14 RX ORDER — METOPROLOL TARTRATE 25 MG/1
25 TABLET, FILM COATED ORAL 2 TIMES DAILY
Status: DISCONTINUED | OUTPATIENT
Start: 2023-03-14 | End: 2023-03-19 | Stop reason: HOSPADM

## 2023-03-14 RX ORDER — SIMETHICONE 80 MG
160 TABLET,CHEWABLE ORAL EVERY 6 HOURS PRN
Status: DISCONTINUED | OUTPATIENT
Start: 2023-03-14 | End: 2023-03-19 | Stop reason: HOSPADM

## 2023-03-14 RX ORDER — OXYCODONE HYDROCHLORIDE 5 MG/1
5 TABLET ORAL EVERY 6 HOURS PRN
Status: DISCONTINUED | OUTPATIENT
Start: 2023-03-14 | End: 2023-03-15

## 2023-03-14 RX ADMIN — Medication 40 MG: at 20:12

## 2023-03-14 RX ADMIN — Medication 2.5 MG: at 22:15

## 2023-03-14 RX ADMIN — METOPROLOL TARTRATE 25 MG: 25 TABLET, FILM COATED ORAL at 19:59

## 2023-03-14 RX ADMIN — ONDANSETRON 4 MG: 4 TABLET, ORALLY DISINTEGRATING ORAL at 23:49

## 2023-03-14 RX ADMIN — HEPARIN SODIUM 5000 UNITS: 5000 INJECTION, SOLUTION INTRAVENOUS; SUBCUTANEOUS at 02:02

## 2023-03-14 RX ADMIN — PREDNISONE 5 MG: 5 TABLET ORAL at 08:22

## 2023-03-14 RX ADMIN — MYCOPHENOLIC ACID 360 MG: 360 TABLET, DELAYED RELEASE ORAL at 18:04

## 2023-03-14 RX ADMIN — URSODIOL 300 MG: 300 CAPSULE ORAL at 19:59

## 2023-03-14 RX ADMIN — URSODIOL 300 MG: 300 CAPSULE ORAL at 08:22

## 2023-03-14 RX ADMIN — Medication 25 MCG: at 08:22

## 2023-03-14 RX ADMIN — B-COMPLEX W/ C & FOLIC ACID TAB 1 MG 1 TABLET: 1 TAB at 08:21

## 2023-03-14 RX ADMIN — TACROLIMUS 3 MG: 1 CAPSULE ORAL at 08:22

## 2023-03-14 RX ADMIN — Medication 1 PACKET: at 13:10

## 2023-03-14 RX ADMIN — MAGNESIUM OXIDE TAB 400 MG (241.3 MG ELEMENTAL MG) 400 MG: 400 (241.3 MG) TAB at 13:02

## 2023-03-14 RX ADMIN — ACETAMINOPHEN 650 MG: 325 TABLET ORAL at 09:58

## 2023-03-14 RX ADMIN — OXYCODONE HYDROCHLORIDE 5 MG: 5 TABLET ORAL at 13:57

## 2023-03-14 RX ADMIN — INSULIN GLARGINE 15 UNITS: 100 INJECTION, SOLUTION SUBCUTANEOUS at 20:03

## 2023-03-14 RX ADMIN — FLUOXETINE HYDROCHLORIDE 60 MG: 40 CAPSULE ORAL at 22:16

## 2023-03-14 RX ADMIN — ASPIRIN 325 MG ORAL TABLET 325 MG: 325 PILL ORAL at 08:22

## 2023-03-14 RX ADMIN — OXYCODONE HYDROCHLORIDE 5 MG: 5 TABLET ORAL at 20:00

## 2023-03-14 RX ADMIN — Medication 1 PACKET: at 19:59

## 2023-03-14 RX ADMIN — Medication 5 MG: at 20:03

## 2023-03-14 RX ADMIN — ONDANSETRON 4 MG: 4 TABLET, ORALLY DISINTEGRATING ORAL at 18:03

## 2023-03-14 RX ADMIN — HEPARIN SODIUM 5000 UNITS: 5000 INJECTION, SOLUTION INTRAVENOUS; SUBCUTANEOUS at 13:02

## 2023-03-14 RX ADMIN — ACETAMINOPHEN 650 MG: 325 TABLET ORAL at 18:04

## 2023-03-14 RX ADMIN — MYCOPHENOLIC ACID 360 MG: 360 TABLET, DELAYED RELEASE ORAL at 08:22

## 2023-03-14 RX ADMIN — Medication 2.5 MG: at 23:49

## 2023-03-14 RX ADMIN — MICAFUNGIN 100 MG: 10 INJECTION, POWDER, LYOPHILIZED, FOR SOLUTION INTRAVENOUS at 08:44

## 2023-03-14 RX ADMIN — Medication 1 PACKET: at 08:24

## 2023-03-14 RX ADMIN — TACROLIMUS 3 MG: 1 CAPSULE ORAL at 18:04

## 2023-03-14 RX ADMIN — Medication 40 MG: at 08:22

## 2023-03-14 ASSESSMENT — ACTIVITIES OF DAILY LIVING (ADL)
ADLS_ACUITY_SCORE: 39

## 2023-03-14 NOTE — PROGRESS NOTES
Calorie Count  Intake recorded for: 3/13  Total Kcals: 70 Total Protein: 5g  Kcals from Hospital Food: 70   Protein: 5g  Kcals from Outside Food (average):0 Protein: 0g  # Meals Ordered from Kitchen: 1 meal   # Meals Recorded: 1 meal - 25% deli sandwich w/ turkey & cheese)   # Supplements Recorded: 0

## 2023-03-14 NOTE — PROVIDER NOTIFICATION
7213-01 Dillon Salter had a BG of 67. Patient drank x1 apple juice and ate 1/2 cracker. Recheck done in 15 minutes was 74. Encouraging 2nd apple juice and rest of cracker. TPN feed running 8p-8am.

## 2023-03-14 NOTE — PLAN OF CARE
"BP (!) 134/94 (BP Location: Left arm)   Pulse 108   Temp 98.6  F (37  C) (Oral)   Resp 18   Wt 72.4 kg (159 lb 9.6 oz)   SpO2 100%   BMI 26.56 kg/m      9013-7644. VSS on RA, afebrile. Q4hr BG, low of 43. Patient came up to 97 after juice, crackers, ensure. Patient endorsing pain 10/10 although appearing comfortable. Patient requesting oxycodone upon waking while still drowsy. RN encouraged use of tylenol first and explained policy to use non-narcotics prior to narcotics. Mother at the bedside stated, \"well no one has ever done that before. I don't want him to go too long without oxy otherwise he will start craving it\". PRN tylenol & oxy administered x1.  Regular diet with poor appetite. PIV SL. LBM 3/14. Oliguric. Clamshell incision stapled, CHERYL. Patient up with assist x1 & walker. Continue with plan of care and update team with any changes.  "

## 2023-03-14 NOTE — PROGRESS NOTES
Immunosuppression Management Note:    Dillon Salter is a 27 year old male who is seen today  for immunosuppression management     I, John Rondon MD, I have examined the patient with our CHRISTA/Fellow as part of a shared visit.   I participated in the rounds,  discussed and agree with the note and findings and  reviewed today's vital signs, medications, labs and imaging as noted in this note.  I  reviewed the  immunosuppression medications.  I personally provided a substantive portion of the care of this patient. I personally performed the immunosuppressive management of this patient, reviewed the overall  immunosuppression including drug levels, allograft function and provided the recommendations to adjust the dose to provide optimal levels to prevent rejection of the allograft and prevent toxicity to the organs. This was complex care due to the fresh allograft.   Time spent: evaluating patient, examining patient, discussion of plan, counseling and documentation: >35 min   I spoke to the patient/family and explained below clinical details and answered all the questions    Transplant Surgery  Inpatient Daily Progress Note  2023    Assessment & Plan: Dillon Salter is a 27 year old male with a past medical history of Asperger's, DM2, and alcoholic cirrhosis c/b esophageal varices and hepatic encephalopathy. He is now s/p  donor liver transplant without stent on 23 with Dr. Sterling. Return to OR for washout and repair of arterial bleeding on 3/1/23.    s/p DDLT 23: POD #14. Transaminases trending down appropriately after transplant until ~ POD #9 when IS subtherapeutic. Work up with US with patent flow. MRCP 3/10 with abrubt caliber change in CBD. Narrowing of the common hepatic duct and central intrahepatic ducts.    -Transaminases now trending down, TB now normalized, 1.3 today. Alk phosphatase remain elevated in the 300s but trending down.  -  ASA 325mg daily& ursodiol 300mg  BID    Imaging:  -3/1 US: resolution of hematoma, patent vessels  -3/3 US: Patent, resolution of perihepatic hematoma with new perihepatic hematoma up to 9 cm.  -3/7 US with good flow.   -3/10 MRCP:   1. Liver transplant. Abrupt caliber change in the common bile duct  which may be related to anastomosis. There is narrowing of the common hepatic duct and central intrahepatic ducts. ERCP may be necessary to investigate this further. Mild periportal edema. Portal and hepatic veins and IVC all appear normal.  2. Multiple perihepatic fluid collections.  3. Right pleural effusion and associated lung base atelectasis.  4. Gallbladder absent corresponding with liver transplant.    Immunosuppression management:  Induction: Steroid taper and basiliximab x2 per renal sparing protocol.  Maintenance:   - mg BID --> reduced to 500mg BID in setting of diarrhea. Changed to Myfortic 360 mg BID 3/8.   -Tacrolimus: 3mg BID; goal level 8-10  -Prednisone 5mg daily    Neuro/Psych:  Fall: Mechanical fall with head trauma (but no LOC) on 3/9 AM in the setting of frailty and opioid use. CTH without bleeding. Fall precautions.   Acute post op pain in the setting of chronic musculoskeletal + neuropathic pain pain: On oxycodone PTA for neuropathy?. Previously had tried gabapentin without benefit. Continue Oxycodone 5mg -reduce to Q6PRN, along with Tylenol 650mg Q8 PRN  - minimize narcotic use due to sedation and falls.  * Pain consult placed for non-opioid management of chronic + neuropathic pain; no indication for chronic opioid use - recommended topical and taper of Oxycodone.   MDD, Anxiety, Autism spectrum: PTA fluoxetine. Zyprexa Zydis 5 mg HS-reduce to 2.5mg in attempt to wean off,  And continue 2.5 mg BID PRN anxiety or hallucinations  Acute delirium: Post operatively had ICU delirium with hallucinations. Continue to minimize narcotics as able.  Zyprexa as above-discontinue after resolution of delirium    Hematology:   Anemia of  chronic disease/Acute blood loss: Hgb stable ~7-9 without overt s/sx of blood loss.   Thrombocytopenia: PLT improving,~200    Cardiorespiratory:   Post op ventilatory support/hypercapnia/acute pulmonary edema: Extubated 3/3. Lethargic and hypercapnic 3/4 AM, improved with BiPAP. Now stable on ambient air.   Hemorrhagic shock: Secondary to bleeding. Resolved.   Tachycardia: Stable mild tachycardia up to 110s    GI/Nutrition:   Severe malnutrition in the context of acute illness: Nutrition consulted. FT in place, change EN Osmolite 1.5 @ 50/hr x12 +2 pkts Prosource. Continued calorie counts given not at goal at this time.   Diarrhea: May be related to tube feeding vs cellcept. Changed to Myfortic.  Changed EN 3/13. Cdiff neg, On Fiber TID.  Improved  Nausea: possibly related to EN. Reduced cycle feeds x12 hrs.     Endocrine:   DM2; Steroid induced hyperglycemia: initially managed with insulin gtt,transitioned to sliding scale insulin. Now on Lantus 20 units BID (home dosing),  +sliding scale insulin PRN.       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 CRRT started 3/1/23. Transitioned to iHD 3/4. Tunneled line placed by IR on 3/10. HD per nephrology.   Hypervolemia: Weight + 6Kg from admit. vol removal with HD.  : anuric, monitor for renal recovery.    Prophylaxis: DVT (mechanical), fall, GI (PPI), fungal (Initially received fluconazole, changed to Micafungin d/t CRRT/re-operation), viral (Valcyte), pneumocystis (Bactrim -restarted 3/6), jalen-op (Zosyn)    Disposition: 7A    CHRITSA/Fellow/Resident Provider: Julieth Leblanc, NP [id number]     Faculty: John Rondon M.D.      __________________________________________________________________  Transplant History:    2/28/2023 (Liver), Postoperative day: 14     Interval History:  No acute events overnight.     Changed EN due to nausea. Which is improved but present.  Appetite remains poor. Up with therapy. Remains on oxy PRN. Denies fever, hallucinations.     ROS:   A 10-point review of systems was negative except as noted above.    Curent Meds:   aspirin  325 mg Per Feeding Tube Daily    fiber modular (NUTRISOURCE FIBER)  1 packet Per Feeding Tube TID    FLUoxetine  60 mg Oral At Bedtime    heparin ANTICOAGULANT  5,000 Units Subcutaneous Q12H    insulin aspart   Subcutaneous TID w/meals    insulin glargine  15 Units Subcutaneous BID    magnesium oxide  400 mg Oral Q24H    melatonin  5 mg Oral QPM    metoprolol tartrate  25 mg Oral BID    multivitamin RENAL  1 tablet Oral Daily    mycophenolic acid  360 mg Oral BID IS    OLANZapine zydis  2.5 mg Oral At Bedtime    pantoprazole  40 mg Per Feeding Tube BID    predniSONE  5 mg Oral Daily    protein modular  1 packet Per Feeding Tube BID    sodium chloride (PF)  3 mL Intravenous Q8H    sulfamethoxazole-trimethoprim  1 tablet Oral or Feeding Tube Once per day on Mon Wed Fri    tacrolimus  3 mg Oral BID IS    ursodiol  300 mg Oral BID    valGANciclovir  450 mg Oral Once per day on Mon Fri    cholecalciferol  25 mcg Oral or Feeding Tube Daily       Physical Exam:     Admit Weight: 65.9 kg (145 lb 4.5 oz)    Current Vitals:   /77 (BP Location: Left arm)   Pulse 99   Temp 98  F (36.7  C) (Oral)   Resp 18   Wt 72.4 kg (159 lb 9.6 oz)   SpO2 94%   BMI 26.56 kg/m      Vital sign ranges:    Temp:  [98  F (36.7  C)-98.7  F (37.1  C)] 98  F (36.7  C)  Pulse:  [] 99  Resp:  [9-41] 18  BP: (121-143)/(77-97) 121/77  SpO2:  [87 %-100 %] 94 %    General Appearance: No acute distress  Skin: warm, dry  Heart: RRR  Lungs: Breathing comfortably on ambient air  Abdomen: abdomen soft, non-distended. Incision c/d/i. resolving eschar to bilateral Upper quads above incision site without overlying erythema or purulence (resolving dermatitis/skin tears)  Extremities: edema: 1+ Mahendra edema bilaterally  Skin: No jaundice.   Neurologic:  A0x3.    Data:   CMP  Recent Labs   Lab 03/14/23  1336 03/14/23  1313 03/14/23  1249 03/14/23  0722 03/13/23  1049 03/13/23  0649   NA  --   --   --  126*  --  126*   POTASSIUM  --   --   --  4.0  --  5.2   CHLORIDE  --   --   --  92*  --  92*   CO2  --   --   --  25  --  27   GLC 97 66*   < > 121*   < > 236*   BUN  --   --   --  34.6*  --  47.6*   CR  --   --   --  2.04*  --  2.41*   GFRESTIMATED  --   --   --  44*  --  36*   SARTHAK  --   --   --  9.2  --  9.0   MAG  --   --   --  1.7  --  1.8   PHOS  --   --   --  2.9  --  3.4   ALBUMIN  --   --   --  3.1*  --  3.1*   BILITOTAL  --   --   --  1.3*  --  1.5*   ALKPHOS  --   --   --  312*  --  312*   AST  --   --   --  22  --  19   ALT  --   --   --  17  --  24    < > = values in this interval not displayed.     CBC  Recent Labs   Lab 03/14/23  0722 03/13/23  0649 03/12/23  0721 03/12/23  0006   HGB 7.6* 7.6*   < >  --    WBC 9.2 8.1   < >  --     198   < >  --    A1C  --   --   --  5.2    < > = values in this interval not displayed.

## 2023-03-14 NOTE — PLAN OF CARE
BP (!) 143/87 (BP Location: Left arm)   Pulse 115   Temp 98.5  F (36.9  C) (Oral)   Resp 18   Wt 72.4 kg (159 lb 9.6 oz)   SpO2 100%   BMI 26.56 kg/m      Shift: 1794-0861  VS: Mildly Hypertensive.   Neuro: Aox4  BG: Q4;   Labs: HGB 7.6  Respiratory: RA  Cardiac: Tachycardic.   Pain/Nausea: Oxy x2. Tylenol x1. Zofran x2.   Diet: Regular   IV Access: PIV SL. Internal jugular, R chest HD.  Lines/Drains: NG tube feed @ 50 8pm-8am.   GI/: Voiding. Last BM 03/12  Skin: Stapled clamshell incision CHERYL.   Mobility: Assist x1 + walker   Plan: Continue with POC.

## 2023-03-14 NOTE — PROGRESS NOTES
[facility]   Transplant Nephrology Progress Note  Date of Admission:  2/11/2023  Today's Date: 03/14/2023    Recommendations:  - Start metoprolol 25 mg PO BID.  - Plan next HD tomorrow.    Assessment & Plan   # ASHISH: Decreased.  Unclear urine output with some unmeasured voids as part of watery stools., but likely anuric/oliguric.     - ASHISH likely secondary to hemorrhagic shock and hemodynamic changes following liver transplant, as well as mild HRS prior to transplant.  - HD Info  Access: Right IJ Tunneled Catheter (placed 3/12), Days: MWF, Length: 4.0 hrs, EDW: 66.0 kg, Heparin: No, MARISEL: No, IV Iron: No, Vit D analog: No   - Baseline Creatinine: ~ 0.6-0.8   - Proteinuria: Normal (<0.2 grams)    # Liver Tx: ESLD secondary to alcoholic cirrhosis, s/p OLT 2/28/23.  Trend down in transaminases, but increased total bilirubin.  Followed by Transplant Surgery.    # Immunosuppression: Tacrolimus immediate release (goal 8-12), Mycophenolic acid (dose 360 mg every 12 hours) and Prednisone (dose 5 mg daily)   - Induction with Recent Transplant:  Per Liver Tx protocol.   - Changes: Not at this time; Management per Transplant Surgery.    # Infection Prophylaxis:   - PJP: Sulfa/TMP (Bactrim)  - CMV: Valganciclovir (Valcyte); CMV IgG Ab recipient and donor negative    # Blood Pressure: Borderline control;  Goal BP: < 140/90 (Hospitalization goal)   - Volume status: Mildly hypervolemic  EDW ~ 66.0 kg   - Changes: Yes - start metoprolol 25 mg PO BID     # Diabetes: Controlled (HbA1c <7%) Last HbA1c: 6.1%   - Management as per primary team.    # Anemia in Chronic Disease: Hgb: Stable, low      MARISEL: No   - Iron studies: Low iron saturation, but high ferritin (2/22/23)    # Mineral Bone Disorder:   - Vitamin D; level: Low (12/20/22)        On supplement: Yes, cholecalciferol 25 mcg PO daily.  - Calcium; level: Normal        On supplement: No  - Phosphorus; level: Normal        On binder:  No    # Electrolytes:   - Potassium; level: Normal        On supplement: No  - Magnesium; level: Normal        On supplement: Yes, magnesium oxide 400 mg PO QD  - Bicarbonate; level: Normal        On supplement: No    # Malnutrition: Patient on tube feedings.    # Diarrhea: Some loose stools.  Now started on fiber.  C. diff negative 3/5.    # EBV IgG Ab Discordance (D+/R-): Will do surveillance EBV PCR qmonth until 12 months post transplant, then q3 months until 2 years post transplant.    # Aspergers/Depression/Hallucinations: Patient appears to be having less hallucinations per his mother.  Seen by Psychiatry and started on olanzapine, as well as continuing on fluoxetine.    # Transplant History:  Etiology of Organ Failure: Alcoholic cirrhosis  Tx: Liver Tx  Transplant: 2/28/2023 (Liver)  Donor Type:  Donor Class:   Crossmatch at time of Tx: negative  DSA at time of Tx: No   Significant changes in immunosuppression: Lower CNI goal due to ASHISH  Significant transplant-related complications: ASHISH    Recommendations were communicated to the primary team verbally.    Patient dicussed with Dr. Mingo Machado APRN CNP   Pager: [phone]    Physician Attestation     I saw and evaluated Dillon Salter as part of a shared APRN/PA visit.     I personally reviewed the vital signs, medications and labs.    I personally performed the substantive portion of the medical decision making for this visit - please see the CHRISTA's documentation for full details.    Key management decisions made by me and carried out under my direction: No acute indications for dialysis today, but plan next HD run tomorrow.  Will start metoprolol 25 mg bid.    Antonio Aguila MD  Date of Service (when I saw the patient): 03/14/23    Interval History   Mr. Salter's creatinine is 2.04 (03/13 0722); Decreased   Urine output appears lower today after several days of dialysis.  Normal transaminases.  Total bilirubin trending down.  Other  significant labs/tests/vitals: Stable electrolytes.   No new events overnight.  No SOB on room air.  No chest pain.  No N/V/D  Leg edema noted.     Review of Systems   4 point ROS was obtained and negative except as noted in the Interval History.    MEDICATIONS:    aspirin  325 mg Per Feeding Tube Daily     fiber modular (NUTRISOURCE FIBER)  1 packet Per Feeding Tube TID     FLUoxetine  60 mg Oral At Bedtime     heparin ANTICOAGULANT  5,000 Units Subcutaneous Q12H     insulin aspart   Subcutaneous TID w/meals     insulin glargine  15 Units Subcutaneous BID     magnesium oxide  400 mg Oral Q24H     melatonin  5 mg Oral QPM     multivitamin RENAL  1 tablet Oral Daily     mycophenolic acid  360 mg Oral BID IS     OLANZapine zydis  2.5 mg Oral At Bedtime     pantoprazole  40 mg Per Feeding Tube BID     predniSONE  5 mg Oral Daily     protein modular  1 packet Per Feeding Tube BID     sodium chloride (PF)  3 mL Intravenous Q8H     sulfamethoxazole-trimethoprim  1 tablet Oral or Feeding Tube Once per day on      tacrolimus  3 mg Oral BID IS     ursodiol  300 mg Oral BID     valGANciclovir  450 mg Oral Once per day on      cholecalciferol  25 mcg Oral or Feeding Tube Daily       dextrose Stopped (23 1544)     dextrose 1,000 mL (03/10/23 7663)       Physical Exam   Temp  Av.6  F (36.4  C)  Min: 92.8  F (33.8  C)  Max: 99.5  F (37.5  C)  Arterial Line BP  Min: 78/56  Max: 173/78  Arterial Line MAP (mmHg)  Av.4 mmHg  Min: 56 mmHg  Max: 190 mmHg      Pulse  Av.4  Min: 62  Max: 112 Resp  Avg: 15.7  Min: 8  Max: 34  FiO2 (%)  Av.5 %  Min: 30 %  Max: 100 %  SpO2  Av.6 %  Min: 84 %  Max: 100 %    CVP (mmHg): 8 mmHgBP (!) 134/94 (BP Location: Left arm)   Pulse 108   Temp 98.6  F (37  C) (Oral)   Resp 18   Wt 72.4 kg (159 lb 9.6 oz)   SpO2 100%   BMI 26.56 kg/m     Date 23 07 - 23 0659   Shift 4002-6168 2037-3982 7342-7209 24 Hour Total   INTAKE   I.V. 156   156    NG/GT 80   80   Enteral 160   160   Shift Total(mL/kg) 396(5.04)   396(5.04)   OUTPUT   Shift Total(mL/kg)       Weight (kg) 78.5 78.5 78.5 78.5      Admit Weight: 65.9 kg (145 lb 4.5 oz)     GENERAL APPEARANCE: Pt sleeping at time of visit.  Awakens to voice.  HENT: mouth without ulcers or lesions, NJ in place  RESP: lungs clear to auscultation - no rales, rhonchi or wheezes  CV: regular rhythm, normal rate, no rub, no murmur  EDEMA: 2+ LE and dependent edema bilaterally  ABDOMEN: soft, nondistended, mild TTP, bowel sounds normal  MS: extremities normal - no gross deformities noted, no evidence of inflammation in joints, no muscle tenderness  SKIN: no rash  DIALYSIS ACCESS:  Right IJ tunneled catheter     Data   All labs reviewed by me.  CMP  Recent Labs   Lab 03/14/23  1313 03/14/23  1249 03/14/23  0722 03/14/23  0452 03/13/23  1049 03/13/23  0649 03/12/23  0810 03/12/23  0721 03/11/23  0714 03/11/23  0651   NA  --   --  126*  --   --  126*  --  127*  --  128*   POTASSIUM  --   --  4.0  --   --  5.2  --  5.0  --  4.4   CHLORIDE  --   --  92*  --   --  92*  --  92*  --  94*   CO2  --   --  25  --   --  27  --  24  --  25   ANIONGAP  --   --  9  --   --  7  --  11  --  9   GLC 66* 43* 121* 136*   < > 236*   < > 203*   < > 373*   BUN  --   --  34.6*  --   --  47.6*  --  54.9*  --  30.2*   CR  --   --  2.04*  --   --  2.41*  --  2.52*  --  1.65*   GFRESTIMATED  --   --  44*  --   --  36*  --  34*  --  57*   SARTHAK  --   --  9.2  --   --  9.0  --  9.6  --  8.6   MAG  --   --  1.7  --   --  1.8  --  1.9  --  2.1   PHOS  --   --  2.9  --   --  3.4  --  3.3  --  3.4   PROTTOTAL  --   --  5.5*  --   --  5.5*  --  5.9*  --  5.6*   ALBUMIN  --   --  3.1*  --   --  3.1*  --  3.4*  --  3.3*   BILITOTAL  --   --  1.3*  --   --  1.5*  --  2.2*  --  2.6*   ALKPHOS  --   --  312*  --   --  312*  --  344*  --  325*   AST  --   --  22  --   --  19  --  21  --  22   ALT  --   --  17  --   --  24  --  33  --  48    < > = values in this  interval not displayed.     CBC  Recent Labs   Lab 03/14/23  0722 03/13/23  0649 03/12/23  0721 03/11/23  0651   HGB 7.6* 7.6* 8.8* 8.5*   WBC 9.2 8.1 8.1 6.5   RBC 2.37* 2.40* 2.76* 2.63*   HCT 24.8* 24.6* 28.1* 26.5*   * 103* 102* 101*   MCH 32.1 31.7 31.9 32.3   MCHC 30.6* 30.9* 31.3* 32.1   RDW 20.5* 20.5* 20.5* 20.8*    198 219 200     INR  Recent Labs   Lab 03/10/23  0603 03/09/23  0134 03/08/23  0424   INR 1.11 1.19* 1.15     ABG  No lab results found in last 7 days.   Urine Studies  Recent Labs   Lab Test 03/12/23  1134 02/27/23  1616 02/24/23  1818 02/22/23  1421   COLOR Dark Yellow* Yellow Light Yellow Yellow   APPEARANCE Cloudy* Clear Clear Clear   URINEGLC 30* Negative Negative Negative   URINEBILI Small* Negative Negative Negative   URINEKETONE Negative Negative Negative Negative   SG 1.027 1.012 1.009 1.010   UBLD Moderate* Negative Negative Negative   URINEPH 5.0 5.5 5.0 5.0   PROTEIN 100* 30* Negative Negative   NITRITE Negative Negative Negative Negative   LEUKEST Small* Negative Negative Negative   RBCU 20* <1 <1 1   WBCU 112* 2 1 2     No lab results found.  PTH  No lab results found.  Iron Studies  Recent Labs   Lab Test 02/22/23  0610 02/20/23  0730 02/16/23  0543 12/20/22  0703 10/06/22  0958 04/07/22  0624   IRON 26*  --   --  249*  --  85   *  --   --   --   --   --    IRONSAT 19  --   --   --   --   --    ASCENCION 1,465* 1,335* 1,725* 2,207* 2,042* 423*       IMAGING:  All imaging studies reviewed by me.

## 2023-03-15 ENCOUNTER — APPOINTMENT (OUTPATIENT)
Dept: PHYSICAL THERAPY | Facility: CLINIC | Age: 28
DRG: 005 | End: 2023-03-15
Attending: STUDENT IN AN ORGANIZED HEALTH CARE EDUCATION/TRAINING PROGRAM
Payer: COMMERCIAL

## 2023-03-15 LAB
ALBUMIN SERPL BCG-MCNC: 3.4 G/DL (ref 3.5–5.2)
ALP SERPL-CCNC: 326 U/L (ref 40–129)
ALT SERPL W P-5'-P-CCNC: 18 U/L (ref 10–50)
ANION GAP SERPL CALCULATED.3IONS-SCNC: 11 MMOL/L (ref 7–15)
AST SERPL W P-5'-P-CCNC: 20 U/L (ref 10–50)
BILIRUB DIRECT SERPL-MCNC: 0.78 MG/DL (ref 0–0.3)
BILIRUB SERPL-MCNC: 1.2 MG/DL
BUN SERPL-MCNC: 58 MG/DL (ref 6–20)
CALCIUM SERPL-MCNC: 9.7 MG/DL (ref 8.6–10)
CHLORIDE SERPL-SCNC: 90 MMOL/L (ref 98–107)
CREAT SERPL-MCNC: 2.89 MG/DL (ref 0.67–1.17)
DEPRECATED HCO3 PLAS-SCNC: 27 MMOL/L (ref 22–29)
ERYTHROCYTE [DISTWIDTH] IN BLOOD BY AUTOMATED COUNT: 19.9 % (ref 10–15)
GFR SERPL CREATININE-BSD FRML MDRD: 29 ML/MIN/1.73M2
GLUCOSE BLDC GLUCOMTR-MCNC: 107 MG/DL (ref 70–99)
GLUCOSE BLDC GLUCOMTR-MCNC: 120 MG/DL (ref 70–99)
GLUCOSE BLDC GLUCOMTR-MCNC: 131 MG/DL (ref 70–99)
GLUCOSE BLDC GLUCOMTR-MCNC: 151 MG/DL (ref 70–99)
GLUCOSE BLDC GLUCOMTR-MCNC: 152 MG/DL (ref 70–99)
GLUCOSE BLDC GLUCOMTR-MCNC: 154 MG/DL (ref 70–99)
GLUCOSE SERPL-MCNC: 125 MG/DL (ref 70–99)
HCT VFR BLD AUTO: 27.4 % (ref 40–53)
HGB BLD-MCNC: 8.5 G/DL (ref 13.3–17.7)
MAGNESIUM SERPL-MCNC: 2 MG/DL (ref 1.7–2.3)
MCH RBC QN AUTO: 32 PG (ref 26.5–33)
MCHC RBC AUTO-ENTMCNC: 31 G/DL (ref 31.5–36.5)
MCV RBC AUTO: 103 FL (ref 78–100)
PHOSPHATE SERPL-MCNC: 3.5 MG/DL (ref 2.5–4.5)
PLATELET # BLD AUTO: 234 10E3/UL (ref 150–450)
POTASSIUM SERPL-SCNC: 4 MMOL/L (ref 3.4–5.3)
PROT SERPL-MCNC: 5.9 G/DL (ref 6.4–8.3)
RBC # BLD AUTO: 2.66 10E6/UL (ref 4.4–5.9)
SODIUM SERPL-SCNC: 128 MMOL/L (ref 136–145)
TACROLIMUS BLD-MCNC: 10.7 UG/L (ref 5–15)
TME LAST DOSE: NORMAL H
TME LAST DOSE: NORMAL H
WBC # BLD AUTO: 11.9 10E3/UL (ref 4–11)

## 2023-03-15 PROCEDURE — 250N000013 HC RX MED GY IP 250 OP 250 PS 637: Performed by: PHYSICIAN ASSISTANT

## 2023-03-15 PROCEDURE — 83735 ASSAY OF MAGNESIUM: CPT | Performed by: PHYSICIAN ASSISTANT

## 2023-03-15 PROCEDURE — 36415 COLL VENOUS BLD VENIPUNCTURE: CPT | Performed by: PHYSICIAN ASSISTANT

## 2023-03-15 PROCEDURE — 250N000013 HC RX MED GY IP 250 OP 250 PS 637: Performed by: INTERNAL MEDICINE

## 2023-03-15 PROCEDURE — 250N000011 HC RX IP 250 OP 636

## 2023-03-15 PROCEDURE — 82248 BILIRUBIN DIRECT: CPT | Performed by: PHYSICIAN ASSISTANT

## 2023-03-15 PROCEDURE — 250N000012 HC RX MED GY IP 250 OP 636 PS 637: Performed by: PHYSICIAN ASSISTANT

## 2023-03-15 PROCEDURE — 80053 COMPREHEN METABOLIC PANEL: CPT | Performed by: PHYSICIAN ASSISTANT

## 2023-03-15 PROCEDURE — 97116 GAIT TRAINING THERAPY: CPT | Mod: GP

## 2023-03-15 PROCEDURE — P9045 ALBUMIN (HUMAN), 5%, 250 ML: HCPCS

## 2023-03-15 PROCEDURE — 258N000003 HC RX IP 258 OP 636

## 2023-03-15 PROCEDURE — 250N000011 HC RX IP 250 OP 636: Performed by: PHYSICIAN ASSISTANT

## 2023-03-15 PROCEDURE — 85027 COMPLETE CBC AUTOMATED: CPT | Performed by: PHYSICIAN ASSISTANT

## 2023-03-15 PROCEDURE — 99233 SBSQ HOSP IP/OBS HIGH 50: CPT | Mod: FS

## 2023-03-15 PROCEDURE — 250N000012 HC RX MED GY IP 250 OP 636 PS 637: Performed by: NURSE PRACTITIONER

## 2023-03-15 PROCEDURE — 250N000013 HC RX MED GY IP 250 OP 250 PS 637: Performed by: NURSE PRACTITIONER

## 2023-03-15 PROCEDURE — 97530 THERAPEUTIC ACTIVITIES: CPT | Mod: GP

## 2023-03-15 PROCEDURE — 120N000011 HC R&B TRANSPLANT UMMC

## 2023-03-15 PROCEDURE — 250N000011 HC RX IP 250 OP 636: Performed by: INTERNAL MEDICINE

## 2023-03-15 PROCEDURE — 80197 ASSAY OF TACROLIMUS: CPT | Performed by: PHYSICIAN ASSISTANT

## 2023-03-15 PROCEDURE — 99232 SBSQ HOSP IP/OBS MODERATE 35: CPT | Mod: GC | Performed by: INTERNAL MEDICINE

## 2023-03-15 PROCEDURE — 97110 THERAPEUTIC EXERCISES: CPT | Mod: GP

## 2023-03-15 PROCEDURE — 84100 ASSAY OF PHOSPHORUS: CPT | Performed by: PHYSICIAN ASSISTANT

## 2023-03-15 PROCEDURE — 90937 HEMODIALYSIS REPEATED EVAL: CPT

## 2023-03-15 RX ORDER — NYSTATIN 100000/ML
500000 SUSPENSION, ORAL (FINAL DOSE FORM) ORAL 4 TIMES DAILY
Status: DISCONTINUED | OUTPATIENT
Start: 2023-03-15 | End: 2023-03-16

## 2023-03-15 RX ORDER — METHOCARBAMOL 500 MG/1
500 TABLET, FILM COATED ORAL EVERY 6 HOURS PRN
Status: DISCONTINUED | OUTPATIENT
Start: 2023-03-15 | End: 2023-03-18

## 2023-03-15 RX ORDER — ALBUMIN (HUMAN) 12.5 G/50ML
50 SOLUTION INTRAVENOUS
Status: DISCONTINUED | OUTPATIENT
Start: 2023-03-15 | End: 2023-03-15

## 2023-03-15 RX ORDER — OXYCODONE HYDROCHLORIDE 5 MG/1
5 TABLET ORAL EVERY 8 HOURS PRN
Status: DISCONTINUED | OUTPATIENT
Start: 2023-03-15 | End: 2023-03-16

## 2023-03-15 RX ADMIN — HEPARIN SODIUM 2000 UNITS: 1000 INJECTION INTRAVENOUS; SUBCUTANEOUS at 16:00

## 2023-03-15 RX ADMIN — PREDNISONE 5 MG: 5 TABLET ORAL at 08:12

## 2023-03-15 RX ADMIN — SODIUM CHLORIDE 300 ML: 9 INJECTION, SOLUTION INTRAVENOUS at 12:08

## 2023-03-15 RX ADMIN — SULFAMETHOXAZOLE AND TRIMETHOPRIM 1 TABLET: 400; 80 TABLET ORAL at 19:40

## 2023-03-15 RX ADMIN — Medication: at 14:40

## 2023-03-15 RX ADMIN — Medication 40 MG: at 08:03

## 2023-03-15 RX ADMIN — ASPIRIN 325 MG ORAL TABLET 325 MG: 325 PILL ORAL at 08:12

## 2023-03-15 RX ADMIN — Medication 25 MCG: at 08:12

## 2023-03-15 RX ADMIN — ACETAMINOPHEN 650 MG: 325 TABLET ORAL at 02:20

## 2023-03-15 RX ADMIN — OXYCODONE HYDROCHLORIDE 5 MG: 5 TABLET ORAL at 16:32

## 2023-03-15 RX ADMIN — MYCOPHENOLIC ACID 360 MG: 360 TABLET, DELAYED RELEASE ORAL at 08:12

## 2023-03-15 RX ADMIN — ONDANSETRON 4 MG: 4 TABLET, ORALLY DISINTEGRATING ORAL at 08:24

## 2023-03-15 RX ADMIN — URSODIOL 300 MG: 300 CAPSULE ORAL at 08:12

## 2023-03-15 RX ADMIN — Medication 1 PACKET: at 08:04

## 2023-03-15 RX ADMIN — Medication 5 MG: at 19:40

## 2023-03-15 RX ADMIN — HEPARIN SODIUM 2100 UNITS: 1000 INJECTION INTRAVENOUS; SUBCUTANEOUS at 16:01

## 2023-03-15 RX ADMIN — NYSTATIN 500000 UNITS: 100000 SUSPENSION ORAL at 19:40

## 2023-03-15 RX ADMIN — MYCOPHENOLIC ACID 360 MG: 360 TABLET, DELAYED RELEASE ORAL at 17:27

## 2023-03-15 RX ADMIN — INSULIN GLARGINE 15 UNITS: 100 INJECTION, SOLUTION SUBCUTANEOUS at 08:44

## 2023-03-15 RX ADMIN — TACROLIMUS 3 MG: 1 CAPSULE ORAL at 17:27

## 2023-03-15 RX ADMIN — INSULIN GLARGINE 15 UNITS: 100 INJECTION, SOLUTION SUBCUTANEOUS at 19:40

## 2023-03-15 RX ADMIN — HEPARIN SODIUM 5000 UNITS: 5000 INJECTION, SOLUTION INTRAVENOUS; SUBCUTANEOUS at 02:19

## 2023-03-15 RX ADMIN — Medication 40 MG: at 19:40

## 2023-03-15 RX ADMIN — B-COMPLEX W/ C & FOLIC ACID TAB 1 MG 1 TABLET: 1 TAB at 08:12

## 2023-03-15 RX ADMIN — METOPROLOL TARTRATE 25 MG: 25 TABLET, FILM COATED ORAL at 19:40

## 2023-03-15 RX ADMIN — TACROLIMUS 3 MG: 1 CAPSULE ORAL at 08:12

## 2023-03-15 RX ADMIN — Medication 1 PACKET: at 16:33

## 2023-03-15 RX ADMIN — URSODIOL 300 MG: 300 CAPSULE ORAL at 19:40

## 2023-03-15 RX ADMIN — HEPARIN SODIUM 5000 UNITS: 5000 INJECTION, SOLUTION INTRAVENOUS; SUBCUTANEOUS at 16:32

## 2023-03-15 RX ADMIN — ONDANSETRON 4 MG: 4 TABLET, ORALLY DISINTEGRATING ORAL at 19:03

## 2023-03-15 RX ADMIN — FLUOXETINE HYDROCHLORIDE 60 MG: 40 CAPSULE ORAL at 21:55

## 2023-03-15 RX ADMIN — OXYCODONE HYDROCHLORIDE 5 MG: 5 TABLET ORAL at 08:24

## 2023-03-15 RX ADMIN — METOPROLOL TARTRATE 25 MG: 25 TABLET, FILM COATED ORAL at 08:12

## 2023-03-15 RX ADMIN — ACETAMINOPHEN 650 MG: 325 TABLET ORAL at 16:32

## 2023-03-15 RX ADMIN — ALBUMIN HUMAN 250 ML: 0.05 INJECTION, SOLUTION INTRAVENOUS at 12:09

## 2023-03-15 RX ADMIN — NYSTATIN 500000 UNITS: 100000 SUSPENSION ORAL at 17:27

## 2023-03-15 RX ADMIN — OXYCODONE HYDROCHLORIDE 5 MG: 5 TABLET ORAL at 02:20

## 2023-03-15 RX ADMIN — Medication 1 PACKET: at 19:40

## 2023-03-15 RX ADMIN — MAGNESIUM OXIDE TAB 400 MG (241.3 MG ELEMENTAL MG) 400 MG: 400 (241.3 MG) TAB at 16:32

## 2023-03-15 ASSESSMENT — ACTIVITIES OF DAILY LIVING (ADL)
ADLS_ACUITY_SCORE: 39

## 2023-03-15 NOTE — PLAN OF CARE
Goal Outcome Evaluation:    /89 (BP Location: Left arm)   Pulse 89   Temp 98.5  F (36.9  C) (Oral)   Resp 18   Wt 72.2 kg (159 lb 3.2 oz)   SpO2 100%   BMI 26.49 kg/m      Shift: 4967-3964  VS: Vitals stable on room air, afebrile  Neuro: Alert and oriented x4   BG: Q4 hr 152, 154  Labs: Awaiting AM labs   Respiratory: WDL  Cardiac: WDL  Pain/Nausea: prn tyelnol x1, oxy x1, zofran x1, zyprexa x1   Diet: Reg diet, low appetite, TF at 50    IV Access: PIV x1 saline locked   Lines/Drains: NG for TF  GI/: Oliguric, last BM 3/14  Skin: Clamshell incision w/ staples CHERYL   Mobility: Assist x1   Plan: Continue with POC and notify team with any changes

## 2023-03-15 NOTE — PROGRESS NOTES
Immunosuppression Management Note:    Dillon Salter is a 27 year old male who is seen today  for immunosuppression management     I, John Rondon MD, I have examined the patient with our CHRISTA/Fellow as part of a shared visit.   I participated in the rounds,  discussed and agree with the note and findings and  reviewed today's vital signs, medications, labs and imaging as noted in this note.  I  reviewed the  immunosuppression medications.  I personally provided a substantive portion of the care of this patient. I personally performed the immunosuppressive management of this patient, reviewed the overall  immunosuppression including drug levels, allograft function and provided the recommendations to adjust the dose to provide optimal levels to prevent rejection of the allograft and prevent toxicity to the organs. This was complex care due to the fresh allograft.   Time spent: evaluating patient, examining patient, discussion of plan, counseling and documentation: >35 min   I spoke to the patient/family and explained below clinical details and answered all the questions    Transplant Surgery  Inpatient Daily Progress Note  03/15/2023    Assessment & Plan: Dillon Salter is a 27 year old male with a past medical history of Asperger's, DM2, and alcoholic cirrhosis c/b esophageal varices and hepatic encephalopathy. He is now s/p  donor liver transplant without stent on 23 with Dr. Sterling. Return to OR for washout and repair of arterial bleeding on 3/1/23.    s/p DDLT 23: POD #15. Transaminases trending down appropriately after transplant until ~ POD #9 when IS subtherapeutic. Liver US with patent flow. MRCP 3/10 with abrubt caliber change in CBD. Narrowing of the common hepatic duct and central intrahepatic ducts.    Total bilirubin has normalized, but alkaline phosphatase remained elevated in the mids 300s. AST and ALT wnl.   - ASA 325mg daily  - Ursodiol 300mg BID    Imaging:  -3/1 US:  resolution of hematoma, patent vessels  -3/3 US: Patent, resolution of perihepatic hematoma with new perihepatic hematoma up to 9 cm.  -3/7 US with good flow.   -3/10 MRCP:   1. Liver transplant. Abrupt caliber change in the common bile duct  which may be related to anastomosis. There is narrowing of the common hepatic duct and central intrahepatic ducts. ERCP may be necessary to investigate this further. Mild periportal edema. Portal and hepatic veins and IVC all appear normal.  2. Multiple perihepatic fluid collections.  3. Right pleural effusion and associated lung base atelectasis.  4. Gallbladder absent corresponding with liver transplant.    Immunosuppression management:  Induction: Steroid taper and basiliximab x2 per renal sparing protocol.  Maintenance:   - mg BID --> reduced to 500mg BID in setting of diarrhea. Changed to Myfortic 360 mg BID 3/8.   -Tacrolimus 3mg BID; goal level 8-10  -Prednisone 5mg daily    Neuro/Psych:  Fall: Mechanical fall with head trauma (but no LOC) on 3/9 AM in the setting of frailty and opioid use. CTH without bleeding. Fall precautions.   Acute post op pain in the setting of chronic musculoskeletal + neuropathic pain pain: On oxycodone PTA for neuropathy since ~ 8/2022. Previously had tried gabapentin without benefit.   - Reduce Oxycodone 5mg to q8h PRN on 3/15 --> plan for q12h on 3/16/2023  - Encourage tylenol use prior to opioid use  - Robaxin 500mg q6h PRN  - Minimize narcotic use due to sedation and falls  * Pain consult placed for non-opioid management of chronic + neuropathic pain; no indication for chronic opioid use - recommended topical agents and taper of Oxycodone.   MDD, Anxiety, Autism spectrum: PTA fluoxetine. Reduce zyprexa to 2.5mg BID PRN  Acute delirium: Post operatively had ICU delirium with hallucinations. Continue to minimize narcotics as able.  Zyprexa as above    Hematology:   Leukocytosis: Noted to have a leukocytosis to 11.9 on 3/15 AM, but  afebrile. Will closely monitor for s/sx of infection.   Anemia of chronic disease/Acute blood loss: Hgb stable ~7-9 without overt s/sx of blood loss.   Thrombocytopenia: PLT improving,~200    Cardiorespiratory:   Post op ventilatory support/hypercapnia/acute pulmonary edema: Extubated 3/3. Lethargic and hypercapnic 3/4 AM, improved with BiPAP. Now stable on ambient air.   Hemorrhagic shock: Secondary to bleeding. Resolved.   Tachycardia: Stable mild tachycardia up to 110s    GI/Nutrition:   Severe malnutrition in the context of acute illness: Nutrition consulted. FT in place, change EN Osmolite 1.5 @ 50/hr x12 +2 pkts Prosource. Continued calorie counts   Diarrhea: May be related to tube feeding vs cellcept. Changed to Myfortic.  Changed EN 3/13. Cdiff neg, On Fiber TID.  Improved  Nausea: possibly related to EN. Reduced cycle feeds x12 hrs.     Endocrine:   DM2; Steroid induced hyperglycemia: initially managed with insulin gtt --> transitioned to sliding scale insulin. Lantus 15 units BID +sliding scale insulin PRN.       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 CRRT started 3/1/23 -- > transitioned to iHD 3/4. Tunneled line placed by IR on 3/10. HD per nephrology.   Hypervolemia: Volume removal with HD.    : anuric, monitor for renal recovery.    Prophylaxis: DVT (mechanical), fall, GI (PPI), fungal (Initially received fluconazole --> Micafungin d/t CRRT/re-operation --> nystatin), viral (Valcyte), pneumocystis (Bactrim -restarted 3/6), jalen-op (Zosyn)    Disposition: 7A    CHRISTA/Fellow/Resident Provider: Marilin Jones    Faculty: John Rondon M.D.      __________________________________________________________________  Transplant History:    2/28/2023 (Liver), Postoperative day: 15     Interval History:  No acute events overnight. Afebrile    Appetite continues to be poor - feels full. Calorie counts in  the last 24 hours: 70kcal. Oxycodone 15mg in the last 24 hours. Last BM was on 3/14/2023.     ROS:   A 10-point review of systems was negative except as noted above.    Curent Meds:   aspirin  325 mg Per Feeding Tube Daily    fiber modular (NUTRISOURCE FIBER)  1 packet Per Feeding Tube TID    FLUoxetine  60 mg Oral At Bedtime    heparin ANTICOAGULANT  5,000 Units Subcutaneous Q12H    insulin aspart   Subcutaneous TID w/meals    insulin glargine  15 Units Subcutaneous BID    magnesium oxide  400 mg Oral Q24H    melatonin  5 mg Oral QPM    metoprolol tartrate  25 mg Oral BID    multivitamin RENAL  1 tablet Oral Daily    mycophenolic acid  360 mg Oral BID IS    OLANZapine zydis  2.5 mg Oral At Bedtime    pantoprazole  40 mg Per Feeding Tube BID    predniSONE  5 mg Oral Daily    protein modular  1 packet Per Feeding Tube BID    sodium chloride (PF)  3 mL Intravenous Q8H    sulfamethoxazole-trimethoprim  1 tablet Oral or Feeding Tube Once per day on Mon Wed Fri    tacrolimus  3 mg Oral BID IS    ursodiol  300 mg Oral BID    valGANciclovir  450 mg Oral Once per day on Mon Fri    cholecalciferol  25 mcg Oral or Feeding Tube Daily       Physical Exam:     Admit Weight: 65.9 kg (145 lb 4.5 oz)    Current Vitals:   /89 (BP Location: Left arm)   Pulse 89   Temp 98.5  F (36.9  C) (Oral)   Resp 18   Wt 72.2 kg (159 lb 3.2 oz)   SpO2 100%   BMI 26.49 kg/m      Vital sign ranges:    Temp:  [98  F (36.7  C)-98.8  F (37.1  C)] 98.5  F (36.9  C)  Pulse:  [] 89  Resp:  [18] 18  BP: (121-148)/(77-94) 136/89  SpO2:  [94 %-100 %] 100 %    General Appearance: No acute distress  Skin: warm, dry  Heart: RRR  Lungs: Breathing comfortably on ambient air  Abdomen: abdomen soft, non-distended. Incision c/d/i. Resolving eschar above incision site without overlying erythema and purulence.  Extremities: edema: 1+ Mahendra edema bilaterally  Skin: No jaundice.   - RIJ dialysis tunneled line  Neurologic: A&Ox 3    Data:   CMP  Recent  Labs   Lab 03/15/23  0635 03/15/23  0603 03/14/23  1249 03/14/23  0722   *  --   --  126*   POTASSIUM 4.0  --   --  4.0   CHLORIDE 90*  --   --  92*   CO2 27  --   --  25   * 154*   < > 121*   BUN 58.0*  --   --  34.6*   CR 2.89*  --   --  2.04*   GFRESTIMATED 29*  --   --  44*   SARTHAK 9.7  --   --  9.2   MAG 2.0  --   --  1.7   PHOS 3.5  --   --  2.9   ALBUMIN 3.4*  --   --  3.1*   BILITOTAL 1.2  --   --  1.3*   ALKPHOS 326*  --   --  312*   AST 20  --   --  22   ALT 18  --   --  17    < > = values in this interval not displayed.     CBC  Recent Labs   Lab 03/15/23  0635 03/14/23  0722 03/12/23  0721 03/12/23  0006   HGB 8.5* 7.6*   < >  --    WBC 11.9* 9.2   < >  --     213   < >  --    A1C  --   --   --  5.2    < > = values in this interval not displayed.

## 2023-03-15 NOTE — PROGRESS NOTES
[facility]   Transplant Nephrology Progress Note  Date of Admission:  2/11/2023  Today's Date: 03/15/2023    Recommendations:  - Next dialysis likely Friday.  Goal to gradually bring down weight with ultrafiltration while avoiding hypotension.    Assessment & Plan   # ASHISH: Increased.  Oligo uric. - ASHISH likely secondary to hemorrhagic shock and hemodynamic changes following liver transplant, as well as mild HRS prior to transplant.  - HD Info  Access: Right IJ Tunneled Catheter (placed 3/12), Days: MWF, Length: 4.0 hrs, EDW: 66.0 kg, Heparin: No, MARISEL: No, IV Iron: No, Vit D analog: No   - Baseline Creatinine: ~ 0.6-0.8   - Proteinuria: Normal (<0.2 grams)    # Liver Tx: ESLD secondary to alcoholic cirrhosis, s/p OLT 2/28/23.  Transaminases and total bilirubin now normal.  Followed by Transplant Surgery.    # Immunosuppression: Tacrolimus immediate release (goal 8-12), Mycophenolic acid (dose 360 mg every 12 hours) and Prednisone (dose 5 mg daily)   - Induction with Recent Transplant:  Per Liver Tx protocol.   - Changes: Not at this time; Management per Transplant Surgery.    # Infection Prophylaxis:   - PJP: Sulfa/TMP (Bactrim)  - CMV: Valganciclovir (Valcyte); CMV IgG Ab recipient and donor negative    # Blood Pressure: Borderline control;  Goal BP: < 140/90 (Hospitalization goal)   - Volume status: Total body volume up, but intravascularly euvolemic  EDW ~ 66.0 kg   - Changes: No     # Diabetes: Controlled (HbA1c <7%) Last HbA1c: 6.1%   - Management as per primary team.    # Anemia in Chronic Disease: Hgb: Stable, low      MARISEL: No   - Iron studies: Low iron saturation, but high ferritin (2/22/23)    # Mineral Bone Disorder:   - Vitamin D; level: Low (12/20/22)        On supplement: Yes, cholecalciferol 25 mcg PO daily.  - Calcium; level: Normal        On supplement: No  - Phosphorus; level: Normal        On binder: No    # Electrolytes:   - Potassium; level: Normal         On supplement: No  - Magnesium; level: Normal        On supplement: Yes, magnesium oxide 400 mg PO QD  - Bicarbonate; level: Normal        On supplement: No    # Malnutrition: Patient on tube feedings.    # Diarrhea: Some loose stools.  Now started on fiber.  C. diff negative 3/5.    # EBV IgG Ab Discordance (D+/R-): Will do surveillance EBV PCR qmonth until 12 months post transplant, then q3 months until 2 years post transplant.    # Aspergers/Depression/Hallucinations: Patient appears to be having less hallucinations per his mother.  Seen by Psychiatry and started on olanzapine, as well as continuing on fluoxetine.    # Transplant History:  Etiology of Organ Failure: Alcoholic cirrhosis  Tx: Liver Tx  Transplant: 2/28/2023 (Liver)  Donor Type:  Donor Class:   Crossmatch at time of Tx: negative  DSA at time of Tx: No   Significant changes in immunosuppression: Lower CNI goal due to ASHISH  Significant transplant-related complications: ASHISH    Recommendations were communicated to the primary team verbally.    Patient dicussed with VAMSHI Terry CNP   Pager: [phone]    Physician Attestation     I saw and evaluated Dillon Salter as part of a shared APRN/PA visit.     I personally reviewed the vital signs, medications and labs.    I personally performed the substantive portion of the medical decision making for this visit - please see the CHRISTA's documentation for full details.    Key management decisions made by me and carried out under my direction: Will plan HD today, then next dialysis on Friday.  Goal to continue to bring down dry weight.    Antonio Aguila MD  Date of Service (when I saw the patient): 3/15/23    Interval History   Mr. Salter's creatinine is 2.89 (03/15 0635); Increased   Urine output appears remains low.  HD today with 3L removed. Post wt 69.6 kg.    Normal transaminases and total bilirubin.,  Other significant labs/tests/vitals: Stable electrolytes.   No new events  overnight.  No SOB on room air.  No chest pain.  No N/V/D  Leg edema noted.     Review of Systems   4 point ROS was obtained and negative except as noted in the Interval History.    MEDICATIONS:    aspirin  325 mg Per Feeding Tube Daily     fiber modular (NUTRISOURCE FIBER)  1 packet Per Feeding Tube TID     FLUoxetine  60 mg Oral At Bedtime     heparin ANTICOAGULANT  5,000 Units Subcutaneous Q12H     insulin aspart   Subcutaneous TID w/meals     insulin glargine  15 Units Subcutaneous BID     magnesium oxide  400 mg Oral Q24H     melatonin  5 mg Oral QPM     metoprolol tartrate  25 mg Oral BID     multivitamin RENAL  1 tablet Oral Daily     mycophenolic acid  360 mg Oral BID IS     nystatin  500,000 Units Oral 4x Daily     pantoprazole  40 mg Per Feeding Tube BID     predniSONE  5 mg Oral Daily     protein modular  1 packet Per Feeding Tube BID     sodium chloride (PF)  3 mL Intravenous Q8H     sulfamethoxazole-trimethoprim  1 tablet Oral or Feeding Tube Once per day on      tacrolimus  3 mg Oral BID IS     ursodiol  300 mg Oral BID     valGANciclovir  450 mg Oral Once per day on      cholecalciferol  25 mcg Oral or Feeding Tube Daily       dextrose Stopped (23 1544)     dextrose 1,000 mL (03/10/23 0753)       Physical Exam   Temp  Av.6  F (36.4  C)  Min: 92.8  F (33.8  C)  Max: 99.5  F (37.5  C)  Arterial Line BP  Min: 78/56  Max: 173/78  Arterial Line MAP (mmHg)  Av.4 mmHg  Min: 56 mmHg  Max: 190 mmHg      Pulse  Av.4  Min: 62  Max: 112 Resp  Avg: 15.7  Min: 8  Max: 34  FiO2 (%)  Av.5 %  Min: 30 %  Max: 100 %  SpO2  Av.6 %  Min: 84 %  Max: 100 %    CVP (mmHg): 8 mmHgBP 116/83 (BP Location: Left arm, Cuff Size: Adult Small)   Pulse 68   Temp 97.8  F (36.6  C) (Oral)   Resp 13   Wt 69.6 kg (153 lb 7 oz)   SpO2 100%   BMI 25.53 kg/m     Date 23 0700 - 23 0659   Shift 0439-3260 6281-4605 6604-8064 24 Hour Total   INTAKE   I.V. 156   156   NG/GT 80    80   Enteral 160   160   Shift Total(mL/kg) 396(5.04)   396(5.04)   OUTPUT   Shift Total(mL/kg)       Weight (kg) 78.5 78.5 78.5 78.5      Admit Weight: 65.9 kg (145 lb 4.5 oz)     GENERAL APPEARANCE: Pt sleeping at time of visit.  Awakens to voice.  HENT: mouth without ulcers or lesions, NJ in place  RESP: lungs clear to auscultation - no rales, rhonchi or wheezes  CV: regular rhythm, normal rate, no rub, no murmur  EDEMA: 2+ LE and dependent edema bilaterally  ABDOMEN: soft, nondistended, mild TTP, bowel sounds normal  MS: extremities normal - no gross deformities noted, no evidence of inflammation in joints, no muscle tenderness  SKIN: no rash  DIALYSIS ACCESS:  Right IJ tunneled catheter     Data   All labs reviewed by me.  CMP  Recent Labs   Lab 03/15/23  1443 03/15/23  1003 03/15/23  0635 03/15/23  0603 03/14/23  1249 03/14/23  0722 03/13/23  1049 03/13/23  0649 03/12/23  0810 03/12/23  0721   NA  --   --  128*  --   --  126*  --  126*  --  127*   POTASSIUM  --   --  4.0  --   --  4.0  --  5.2  --  5.0   CHLORIDE  --   --  90*  --   --  92*  --  92*  --  92*   CO2  --   --  27  --   --  25  --  27  --  24   ANIONGAP  --   --  11  --   --  9  --  7  --  11   * 131* 125* 154*   < > 121*   < > 236*   < > 203*   BUN  --   --  58.0*  --   --  34.6*  --  47.6*  --  54.9*   CR  --   --  2.89*  --   --  2.04*  --  2.41*  --  2.52*   GFRESTIMATED  --   --  29*  --   --  44*  --  36*  --  34*   SARTHAK  --   --  9.7  --   --  9.2  --  9.0  --  9.6   MAG  --   --  2.0  --   --  1.7  --  1.8  --  1.9   PHOS  --   --  3.5  --   --  2.9  --  3.4  --  3.3   PROTTOTAL  --   --  5.9*  --   --  5.5*  --  5.5*  --  5.9*   ALBUMIN  --   --  3.4*  --   --  3.1*  --  3.1*  --  3.4*   BILITOTAL  --   --  1.2  --   --  1.3*  --  1.5*  --  2.2*   ALKPHOS  --   --  326*  --   --  312*  --  312*  --  344*   AST  --   --  20  --   --  22  --  19  --  21   ALT  --   --  18  --   --  17  --  24  --  33    < > = values in this interval  not displayed.     CBC  Recent Labs   Lab 03/15/23  0635 03/14/23  0722 03/13/23  0649 03/12/23  0721   HGB 8.5* 7.6* 7.6* 8.8*   WBC 11.9* 9.2 8.1 8.1   RBC 2.66* 2.37* 2.40* 2.76*   HCT 27.4* 24.8* 24.6* 28.1*   * 105* 103* 102*   MCH 32.0 32.1 31.7 31.9   MCHC 31.0* 30.6* 30.9* 31.3*   RDW 19.9* 20.5* 20.5* 20.5*    213 198 219     INR  Recent Labs   Lab 03/10/23  0603 03/09/23  0134   INR 1.11 1.19*     ABG  No lab results found in last 7 days.   Urine Studies  Recent Labs   Lab Test 03/12/23  1134 02/27/23  1616 02/24/23  1818 02/22/23  1421   COLOR Dark Yellow* Yellow Light Yellow Yellow   APPEARANCE Cloudy* Clear Clear Clear   URINEGLC 30* Negative Negative Negative   URINEBILI Small* Negative Negative Negative   URINEKETONE Negative Negative Negative Negative   SG 1.027 1.012 1.009 1.010   UBLD Moderate* Negative Negative Negative   URINEPH 5.0 5.5 5.0 5.0   PROTEIN 100* 30* Negative Negative   NITRITE Negative Negative Negative Negative   LEUKEST Small* Negative Negative Negative   RBCU 20* <1 <1 1   WBCU 112* 2 1 2     No lab results found.  PTH  No lab results found.  Iron Studies  Recent Labs   Lab Test 02/22/23  0610 02/20/23  0730 02/16/23  0543 12/20/22  0703 10/06/22  0958 04/07/22  0624   IRON 26*  --   --  249*  --  85   *  --   --   --   --   --    IRONSAT 19  --   --   --   --   --    ASCENCION 1,465* 1,335* 1,725* 2,207* 2,042* 423*       IMAGING:  All imaging studies reviewed by me.

## 2023-03-15 NOTE — PLAN OF CARE
Goal Outcome Evaluation:    Shift: 8080-3740    Vital Signs: /84 (BP Location: Left arm, Patient Position: Semi-Medrano's, Cuff Size: Adult Regular)   Pulse 98   Temp 98.8  F (37.1  C) (Oral)   Resp 18   Wt 72.4 kg (159 lb 9.6 oz)   SpO2 98%   BMI 26.56 kg/m      Neuro: A&Ox4, slightly forgetful, able to make needs known.   Respiratory: Dim lung sounds, denies SOB  Cardiac: Tachy at times, denies chest pain  Pain/Nausea: C/O incisional pian. Given PRN Oxycodone and Tylenol x1 during this shift.    Diet: Regular   IV Access: PIV SL.R chest HD.  Lines/Drains: NG tube feed @ 50 8pm-8am.   GI/: Voiding at times--saving--Pt on HD. Last BM 03/14. Poor appetite--didn't order dinner, stating he had bites of food from home, Feeding tube from 8pm-8am with [facility].   Skin: Stapled clamshell incision CHERYL, old RIJ site covered.   Mobility: Assist x1 + walker   Plan: Continue with POC

## 2023-03-15 NOTE — PROGRESS NOTES
Transplant Social Work Services Progress Note      Date of Initial Social Work Evaluation: 2022  Collaborated with: Mother Leticia    Data: Dillon underwent a  donor liver transplant on 23.   Intervention: Vianca attempted to meet with Dillon at the bedside. Dillon was in dialysis but his mother Leticia was present for a check in. Sw provided an update on the barriers to discharge at this time and mother Leticia was understanding. She had no further questions or concerns for SW and are aware we continue to follow for discharge planning.   Assessment: No new assessment.   Education provided by SW: Discharge plan  Plan:    Discharge Plans in Progress:  [facility] TCU/ARU are following patient.    Barriers to d/c plan: Medical stability, regular 3 day per week dialysis schedule, and available ARU bed.     Follow up Plan: Sw to continue to follow and collaborated with medical team and  ARU to facilitate discharge planing.     Zuleika Weems, HARLAN  SOT/BMT/CF Float

## 2023-03-15 NOTE — PLAN OF CARE
Vital signs:  Temp: 98.4  F (36.9  C) Temp src: Oral BP: 125/77 Pulse: 81   Resp: 16 SpO2: 100 % O2 Device: None (Room air) Oxygen Delivery: 2 LPM   Weight: 69.6 kg (153 lb 7 oz)    7a-7p: VSS on RA. +bm. Down for HD, tolerated well. Worked with PT in AM. Poor apetite, cycle TF at night. Oxy given x2 for pain.    Problem: Hemodialysis  Goal: Safe, Effective Therapy Delivery  Outcome: Progressing  Intervention: Optimize Device Care and Function  Recent Flowsheet Documentation  Taken 3/15/2023 1630 by Dahiana Mishra, RN  Medication Review/Management: medications reviewed  Taken 3/15/2023 0835 by Dahiana Mishra, RN  Medication Review/Management: medications reviewed   Goal Outcome Evaluation:

## 2023-03-15 NOTE — PROGRESS NOTES
HEMODIALYSIS TREATMENT NOTE    Date: 3/15/2023  Time: 5:09 PM    Data:  Pre Wt:   72.2 kg  Desired Wt:   To be determined   Post Wt:  69.6 kg  Weight change: -2.6 kg  Ultrafiltration - Post Run Net Total Removed (mL): 3000 mL  Vascular Access Status: patent  Dialyzer Rinse: Light, Streaked  Total Blood Volume Processed: 82.39 L Liters  Total Dialysis (Treatment) Time: 4 Hours    Lab:   No    Interventions:  Completed 4 hrs of HD via Right CVC.   IV Albumin given at the start of HD.  Heparin lock done and new clear guard HD caps applied.  Report given to PCN.  See MAR, Flow sheet and MD order for further details    Assessment:  A/O x4; Pt conscious and coherent to date, time and place.  CVC CDI  Lung sounds Anterior/Lateral diminished BUL/BLL  Pt Hemodynamically stable on HD     Plan:    Per Renal

## 2023-03-16 ENCOUNTER — APPOINTMENT (OUTPATIENT)
Dept: PHYSICAL THERAPY | Facility: CLINIC | Age: 28
DRG: 005 | End: 2023-03-16
Attending: STUDENT IN AN ORGANIZED HEALTH CARE EDUCATION/TRAINING PROGRAM
Payer: COMMERCIAL

## 2023-03-16 ENCOUNTER — APPOINTMENT (OUTPATIENT)
Dept: OCCUPATIONAL THERAPY | Facility: CLINIC | Age: 28
DRG: 005 | End: 2023-03-16
Attending: STUDENT IN AN ORGANIZED HEALTH CARE EDUCATION/TRAINING PROGRAM
Payer: COMMERCIAL

## 2023-03-16 PROBLEM — E43 SEVERE MALNUTRITION (H): Status: ACTIVE | Noted: 2023-03-16

## 2023-03-16 PROBLEM — Z94.4 LIVER REPLACED BY TRANSPLANT (H): Status: ACTIVE | Noted: 2023-03-16

## 2023-03-16 PROBLEM — G89.18 ACUTE POST-OPERATIVE PAIN: Status: ACTIVE | Noted: 2023-03-16

## 2023-03-16 PROBLEM — R41.0 DELIRIUM: Status: ACTIVE | Noted: 2023-03-06

## 2023-03-16 PROBLEM — G89.29 OTHER CHRONIC PAIN: Status: ACTIVE | Noted: 2023-03-16

## 2023-03-16 PROBLEM — R19.7 DIARRHEA: Status: ACTIVE | Noted: 2023-03-16

## 2023-03-16 PROBLEM — D84.9 IMMUNOSUPPRESSED STATUS (H): Status: ACTIVE | Noted: 2023-03-16

## 2023-03-16 LAB
ALBUMIN SERPL BCG-MCNC: 3.3 G/DL (ref 3.5–5.2)
ALP SERPL-CCNC: 311 U/L (ref 40–129)
ALT SERPL W P-5'-P-CCNC: 15 U/L (ref 10–50)
ANION GAP SERPL CALCULATED.3IONS-SCNC: 10 MMOL/L (ref 7–15)
AST SERPL W P-5'-P-CCNC: 24 U/L (ref 10–50)
BILIRUB SERPL-MCNC: 1.1 MG/DL
BUN SERPL-MCNC: 54 MG/DL (ref 6–20)
CALCIUM SERPL-MCNC: 9.4 MG/DL (ref 8.6–10)
CHLORIDE SERPL-SCNC: 93 MMOL/L (ref 98–107)
CREAT SERPL-MCNC: 2.53 MG/DL (ref 0.67–1.17)
DEPRECATED HCO3 PLAS-SCNC: 25 MMOL/L (ref 22–29)
ERYTHROCYTE [DISTWIDTH] IN BLOOD BY AUTOMATED COUNT: 20.1 % (ref 10–15)
GFR SERPL CREATININE-BSD FRML MDRD: 34 ML/MIN/1.73M2
GLUCOSE BLDC GLUCOMTR-MCNC: 148 MG/DL (ref 70–99)
GLUCOSE BLDC GLUCOMTR-MCNC: 173 MG/DL (ref 70–99)
GLUCOSE BLDC GLUCOMTR-MCNC: 215 MG/DL (ref 70–99)
GLUCOSE BLDC GLUCOMTR-MCNC: 215 MG/DL (ref 70–99)
GLUCOSE SERPL-MCNC: 242 MG/DL (ref 70–99)
HCT VFR BLD AUTO: 26.2 % (ref 40–53)
HGB BLD-MCNC: 8 G/DL (ref 13.3–17.7)
MAGNESIUM SERPL-MCNC: 1.9 MG/DL (ref 1.7–2.3)
MCH RBC QN AUTO: 31.9 PG (ref 26.5–33)
MCHC RBC AUTO-ENTMCNC: 30.5 G/DL (ref 31.5–36.5)
MCV RBC AUTO: 104 FL (ref 78–100)
PHOSPHATE SERPL-MCNC: 3.5 MG/DL (ref 2.5–4.5)
PLATELET # BLD AUTO: 254 10E3/UL (ref 150–450)
POTASSIUM SERPL-SCNC: 4.3 MMOL/L (ref 3.4–5.3)
PROT SERPL-MCNC: 5.8 G/DL (ref 6.4–8.3)
RBC # BLD AUTO: 2.51 10E6/UL (ref 4.4–5.9)
SODIUM SERPL-SCNC: 128 MMOL/L (ref 136–145)
WBC # BLD AUTO: 10.4 10E3/UL (ref 4–11)

## 2023-03-16 PROCEDURE — 250N000012 HC RX MED GY IP 250 OP 636 PS 637: Performed by: PHYSICIAN ASSISTANT

## 2023-03-16 PROCEDURE — 120N000011 HC R&B TRANSPLANT UMMC

## 2023-03-16 PROCEDURE — 250N000013 HC RX MED GY IP 250 OP 250 PS 637: Performed by: NURSE PRACTITIONER

## 2023-03-16 PROCEDURE — 99232 SBSQ HOSP IP/OBS MODERATE 35: CPT | Mod: FS | Performed by: TRANSPLANT SURGERY

## 2023-03-16 PROCEDURE — 97535 SELF CARE MNGMENT TRAINING: CPT | Mod: GO | Performed by: OCCUPATIONAL THERAPIST

## 2023-03-16 PROCEDURE — 250N000013 HC RX MED GY IP 250 OP 250 PS 637: Performed by: SURGERY

## 2023-03-16 PROCEDURE — 250N000011 HC RX IP 250 OP 636: Performed by: PHYSICIAN ASSISTANT

## 2023-03-16 PROCEDURE — 250N000011 HC RX IP 250 OP 636: Performed by: INTERNAL MEDICINE

## 2023-03-16 PROCEDURE — 99233 SBSQ HOSP IP/OBS HIGH 50: CPT | Mod: FS

## 2023-03-16 PROCEDURE — 97110 THERAPEUTIC EXERCISES: CPT | Mod: GO | Performed by: OCCUPATIONAL THERAPIST

## 2023-03-16 PROCEDURE — 250N000013 HC RX MED GY IP 250 OP 250 PS 637: Performed by: PHYSICIAN ASSISTANT

## 2023-03-16 PROCEDURE — 36415 COLL VENOUS BLD VENIPUNCTURE: CPT | Performed by: PHYSICIAN ASSISTANT

## 2023-03-16 PROCEDURE — 250N000013 HC RX MED GY IP 250 OP 250 PS 637: Performed by: INTERNAL MEDICINE

## 2023-03-16 PROCEDURE — 97110 THERAPEUTIC EXERCISES: CPT | Mod: GP

## 2023-03-16 PROCEDURE — 84100 ASSAY OF PHOSPHORUS: CPT | Performed by: PHYSICIAN ASSISTANT

## 2023-03-16 PROCEDURE — 97530 THERAPEUTIC ACTIVITIES: CPT | Mod: GP

## 2023-03-16 PROCEDURE — 97116 GAIT TRAINING THERAPY: CPT | Mod: GP

## 2023-03-16 PROCEDURE — 85027 COMPLETE CBC AUTOMATED: CPT | Performed by: PHYSICIAN ASSISTANT

## 2023-03-16 PROCEDURE — 97530 THERAPEUTIC ACTIVITIES: CPT | Mod: GO | Performed by: OCCUPATIONAL THERAPIST

## 2023-03-16 PROCEDURE — 80053 COMPREHEN METABOLIC PANEL: CPT | Performed by: PHYSICIAN ASSISTANT

## 2023-03-16 PROCEDURE — 83735 ASSAY OF MAGNESIUM: CPT | Performed by: PHYSICIAN ASSISTANT

## 2023-03-16 RX ORDER — NYSTATIN 100000/ML
500000 SUSPENSION, ORAL (FINAL DOSE FORM) ORAL 4 TIMES DAILY
Status: DISCONTINUED | OUTPATIENT
Start: 2023-03-16 | End: 2023-03-19 | Stop reason: HOSPADM

## 2023-03-16 RX ADMIN — OXYCODONE HYDROCHLORIDE 5 MG: 5 TABLET ORAL at 20:58

## 2023-03-16 RX ADMIN — ONDANSETRON 4 MG: 4 TABLET, ORALLY DISINTEGRATING ORAL at 16:35

## 2023-03-16 RX ADMIN — ACETAMINOPHEN 650 MG: 325 TABLET ORAL at 08:41

## 2023-03-16 RX ADMIN — ACETAMINOPHEN 650 MG: 325 TABLET ORAL at 16:35

## 2023-03-16 RX ADMIN — MAGNESIUM OXIDE TAB 400 MG (241.3 MG ELEMENTAL MG) 400 MG: 400 (241.3 MG) TAB at 12:46

## 2023-03-16 RX ADMIN — ACETAMINOPHEN 650 MG: 325 TABLET ORAL at 00:14

## 2023-03-16 RX ADMIN — OXYCODONE HYDROCHLORIDE 5 MG: 5 TABLET ORAL at 00:13

## 2023-03-16 RX ADMIN — Medication 25 MCG: at 08:43

## 2023-03-16 RX ADMIN — ONDANSETRON 4 MG: 4 TABLET, ORALLY DISINTEGRATING ORAL at 09:55

## 2023-03-16 RX ADMIN — TACROLIMUS 3 MG: 1 CAPSULE ORAL at 08:41

## 2023-03-16 RX ADMIN — TACROLIMUS 3 MG: 1 CAPSULE ORAL at 18:24

## 2023-03-16 RX ADMIN — URSODIOL 300 MG: 300 CAPSULE ORAL at 08:44

## 2023-03-16 RX ADMIN — ONDANSETRON 4 MG: 4 TABLET, ORALLY DISINTEGRATING ORAL at 23:05

## 2023-03-16 RX ADMIN — OXYCODONE HYDROCHLORIDE 5 MG: 5 TABLET ORAL at 08:43

## 2023-03-16 RX ADMIN — INSULIN GLARGINE 15 UNITS: 100 INJECTION, SOLUTION SUBCUTANEOUS at 20:57

## 2023-03-16 RX ADMIN — FLUOXETINE HYDROCHLORIDE 60 MG: 40 CAPSULE ORAL at 21:48

## 2023-03-16 RX ADMIN — ONDANSETRON 4 MG: 4 TABLET, ORALLY DISINTEGRATING ORAL at 04:19

## 2023-03-16 RX ADMIN — Medication 1 PACKET: at 08:40

## 2023-03-16 RX ADMIN — ASPIRIN 325 MG ORAL TABLET 325 MG: 325 PILL ORAL at 08:42

## 2023-03-16 RX ADMIN — METHOCARBAMOL 500 MG: 500 TABLET ORAL at 04:19

## 2023-03-16 RX ADMIN — B-COMPLEX W/ C & FOLIC ACID TAB 1 MG 1 TABLET: 1 TAB at 08:41

## 2023-03-16 RX ADMIN — METOPROLOL TARTRATE 25 MG: 25 TABLET, FILM COATED ORAL at 08:43

## 2023-03-16 RX ADMIN — NYSTATIN 500000 UNITS: 100000 SUSPENSION ORAL at 08:42

## 2023-03-16 RX ADMIN — Medication 2.5 MG: at 00:14

## 2023-03-16 RX ADMIN — Medication 5 MG: at 20:58

## 2023-03-16 RX ADMIN — HEPARIN SODIUM 5000 UNITS: 5000 INJECTION, SOLUTION INTRAVENOUS; SUBCUTANEOUS at 13:57

## 2023-03-16 RX ADMIN — URSODIOL 300 MG: 300 CAPSULE ORAL at 20:58

## 2023-03-16 RX ADMIN — MYCOPHENOLIC ACID 360 MG: 360 TABLET, DELAYED RELEASE ORAL at 18:23

## 2023-03-16 RX ADMIN — NYSTATIN 500000 UNITS: 100000 SUSPENSION ORAL at 20:57

## 2023-03-16 RX ADMIN — INSULIN GLARGINE 15 UNITS: 100 INJECTION, SOLUTION SUBCUTANEOUS at 08:40

## 2023-03-16 RX ADMIN — NYSTATIN 500000 UNITS: 100000 SUSPENSION ORAL at 16:25

## 2023-03-16 RX ADMIN — Medication 2.5 MG: at 23:00

## 2023-03-16 RX ADMIN — Medication 40 MG: at 08:40

## 2023-03-16 RX ADMIN — PREDNISONE 5 MG: 5 TABLET ORAL at 08:43

## 2023-03-16 RX ADMIN — Medication 40 MG: at 21:24

## 2023-03-16 RX ADMIN — Medication 1 PACKET: at 13:57

## 2023-03-16 RX ADMIN — MYCOPHENOLIC ACID 360 MG: 360 TABLET, DELAYED RELEASE ORAL at 08:43

## 2023-03-16 RX ADMIN — NYSTATIN 500000 UNITS: 100000 SUSPENSION ORAL at 12:47

## 2023-03-16 RX ADMIN — Medication 1 PACKET: at 21:06

## 2023-03-16 RX ADMIN — METOPROLOL TARTRATE 25 MG: 25 TABLET, FILM COATED ORAL at 20:58

## 2023-03-16 ASSESSMENT — ACTIVITIES OF DAILY LIVING (ADL)
ADLS_ACUITY_SCORE: 38
ADLS_ACUITY_SCORE: 39
ADLS_ACUITY_SCORE: 38

## 2023-03-16 NOTE — PROGRESS NOTES
Transplant Social Work Services Progress Note    Date of Initial Social Work Evaluation: 2022  Collaborated with: Patient at bedside and mother.     Data: Dillon underwent a  donor liver transplant on 2023.   Intervention: I met with Dillon and his mother at bedside. I discussed potential plan to discharge home d/t progressing out of ARU. Pt feels like he is not doing better than prior to transplant, however is better than last week. Leticia had concerns about his mobility at home and I provided education regarding that he still may have needs when he returns home. I discussed the need for OP HD and they are OK with referral to [facility]. [facility] is the closest to their home. They also had [facility] home care initiated prior to admission. They are OK with referral back to them as well.   Assessment: Patient is starting to progress out of ARU.   Education provided by SW: ARU vs home   Plan:    Discharge Plans in Progress: ARU vs home - pending OT assessment and progression with therapies     Barriers to d/c plan: Teaching, OP HD, home care, ARU bed availability     Follow up Plan: This writer will follow for potential ARU discharge    CHRISTIAN Bennett, [facility]  Liver Transplant   [facility]  Phone: [phone]  Pager: [phone]

## 2023-03-16 NOTE — DISCHARGE SUMMARY
[facility]    Discharge Summary  Transplant Surgery  I evaluated the patient today.  I reviewed the discharge plan with the fellow, and agree with the final assessment and plan for discharge as noted in the discharge summary.  I personally spent  30 minutes or less  today on discharge activities for this patient.      John Rondon MD, JARED  Professor of Surgery  [employer]  Surgical Director of Liver Transplant Program  Executive Medical Director of Pediatric Transplantation   Date of Admission:  2023  Date of Discharge:  3/19/2023  Discharging Provider: Julieth Leblanc NP/John Rondon M.D.        Discharge Diagnoses   Principal Problem:    Liver replaced by transplant (H)  Active Problems:    Alcoholic cirrhosis of liver with ascites (H)    Diabetes mellitus type 2 in obese (H)    Autism spectrum disorder    Acute posthemorrhagic anemia    Hepatic encephalopathy    Hematemesis    Acute kidney failure, unspecified (H)    Immunosuppressed status (H)    Diarrhea    Severe malnutrition (H)    Delirium    Acute post-operative pain    Other chronic pain      History of Present Illness   iDllon Salter is an 27 year old male with history of Asperger's, DM2, alcoholic cirrhosis, esophageal varices, and hepatic encephalopathy. S/p  donor liver transplant without stent on 23 with Dr. Sterling. Return to OR for washout and repair of arterial bleeding on 3/1/23.    Hospital Course   Liver transplant:   LFTs trended down as expected after transplant with the exception of a mildly elevated alk phos. MRCP 3/10/23 wshowed abrubt caliber change in CBD, narrowing of the common hepatic duct and central intrahepatic ducts. Continue ursodiol.    Immunosuppression:    -Induction basiliximab x2 doses  -Induction steroid taper per protocol.  -Tacrolimus, goal level 6-8  -Mycophenelate 750mg BID.  -Prednisone 5mg daily with renal  spairing tac, level <8  -Opportunistic infectious prophylaxis with Bactrim x6 months and Valcyte x12 weeks.    Transplant coordinator Vicky Diazmelyssa [phone].  Biliary stent:  NO  Donor status:  DBD  CMV D - / R -  EBV D + / R -  Anticoagulation plan: Aspirin 325mg daily x6 months for hepatic artery prophylaxis.    Neuro/Psych:  Fall: Mechanical fall with head trauma on 3/9/23 in the setting of frailty and opioid use. Head CT negative for bleeding. Fall precautions.     Acute post op pain in the setting of chronic musculoskeletal and neuropathic pain pain: On oxycodone for neuropathy since ~ 8/2022. Previously had tried gabapentin without benefit. Pain Team consulted recommended non-opioid management of chronic + neuropathic pain; no indication for chronic opioid use. His oxycodone was weaned off by discharge. He will discharge on: Acetaminophen, Robaxin, Lidocaine. No Opiate prescription provided.    MDD, Anxiety, Autism spectrum: Continue fluoxetine. Zyprexa started inpatient. Weaned off prior to discharge.      Acute delirium: Post operatively had ICU delirium with hallucinations. Resolved prior to discharge.     Hematology:   Leukocytosis: Noted to have a leukocytosis to 11.9 on 3/15, improved and normalized by discharge.    Anemia of chronic disease/Acute blood loss: Hgb stable ~7-9 without overt s/sx of blood loss. Last transfused on 3/18.     Cardiorespiratory:   Hemorrhagic shock: Secondary to bleeding post-op. Resolved.      GI/Nutrition:   Severe malnutrition in the context of acute illness: Nutrition consulted. FT placed. Calorie counts remain low.    Diarrhea: May have been realated to EN and mycophenolate. Changed to Myfortic for some time. Diarrhea improved, transitioned back to Mycophenolate at discharge due to insurance. (A PA was submitted for Mycophenolic acid 3/17). Monitor for GI discomfort.       Nausea: Possibly related to enteral nutrition. Reduced cycle feeds x12 hrs.      Endocrine:    DM2; Steroid induced hyperglycemia: Initially managed with insulin gtt. Will discharge on lantus 15 qam/10 qpm and sliding scale insulin.      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 CRRT started 3/1/23. Transitioned to iHD 3/4. Tunneled line placed by IR on 3/10. He will continue on HD MWF outpatient. He was last dialyzed on 3/19 and will start outpatient on Wed 3/22/23. Remains oliguric, advised to monitor output for early signs of renal recovery.    Hypervolemia: Volume removal with HD.      Significant Results and Procedures   2/12/23 EGD:    Nonbleeding esophogeal ulcer in the distal esophogeal- likely site of previous banding. Diffuse portal hypertensive gastropathy with oozing in the fundus.      Procedure date:  03/01/23    Preoperative diagnosis:  End Stage Liver Disease due to Laennec's    Postoperative diagnosis:  Same    Procedure:  1. 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 End-to-end Choledochocholedochostomy   3. Venovenous bypass    Surgeon:  Surgeon(s) and Role:     * Thelma Sterling MD - Primary     * Romeo Acevedo MD - Assisting     * Morales Wang MD - Resident - Assisting     * Anderson Broussard MD - Fellow - Assisting     * Jerson Valdez MD - Fellow - Assisting     * Joshua Cox MD - Fellow - Assisting     3/1/23  Preop Dx:  S/p liver transplant, concern for intra-abdominal bleeding  Postop Dx: intra-abdominal hemorrhage  Procedure: return to OR, washout, repair of bleeder, closure  Surgeon: Romeo Acevedo MD      Code Status   Full    Primary Care Physician   Gary Rodrigues    Physical Exam   Temp: 98.3  F (36.8  C) Temp src: Oral BP: 124/82 Pulse: 71   Resp: 15 SpO2: 100 % O2 Device: None (Room air)    Vitals:    03/15/23 0111 03/15/23 1616 03/16/23 0000   Weight: 72.2 kg (159 lb 3.2 oz) 69.6 kg (153 lb 7 oz) 70.1 kg (154 lb 8.7 oz)      Vital Signs with Ranges  Temp:  [98.3  F (36.8  C)-98.7  F (37.1  C)] 98.3  F (36.8  C)  Pulse:  [68-72] 71  Resp:  [10-19] 15  BP: ()/(53-86) 124/82  SpO2:  [96 %-100 %] 100 %  I/O last 3 completed shifts:  In: 1145 [P.O.:1145]  Out: 2600 [Urine:100; Other:2500]      General Appearance: No acute distress,sleepy, easy to arouse  Skin: warm, dry  Heart: RRR  Lungs: Breathing comfortably on ambient air  Abdomen: abdomen soft, non-distended. Incision c/d/i. Resolving eschar above incision site without overlying erythema and purulence.  Extremities: edema: 1+ Mahendra edema bilaterally  Skin: No jaundice.   - RIJ dialysis tunneled line  Neurologic: A&Ox 3    Time Spent on this Encounter   I, Julieth Leblanc NP, personally saw the patient today and spent greater than 30 minutes discharging this patient.    Discharge Disposition   Discharged to home  Condition at discharge: Good    Consultations This Hospital Stay   GI HEPATOLOGY ADULT IP CONSULT  NURSING TO CONSULT FOR VASCULAR ACCESS CARE IP CONSULT  INTERVENTIONAL RADIOLOGY ADULT/PEDS IP CONSULT  NUTRITION SERVICES ADULT IP CONSULT  PHARMACY IP CONSULT  CARE MANAGEMENT / SOCIAL WORK IP CONSULT  NUTRITION SERVICES ADULT IP CONSULT  PHYSICAL THERAPY ADULT IP CONSULT  OCCUPATIONAL THERAPY ADULT IP CONSULT  PHARMACY IP CONSULT  NURSING TO CONSULT FOR VASCULAR ACCESS CARE IP CONSULT  ENDOCRINE DIABETES ADULT IP CONSULT  PHARMACY IP CONSULT  NURSING TO CONSULT FOR VASCULAR ACCESS CARE IP CONSULT  NURSING TO CONSULT FOR VASCULAR ACCESS CARE IP CONSULT  NEPHROLOGY GENERAL ADULT IP CONSULT  CARDIOLOGY GENERAL ADULT IP CONSULT  LYMPHEDEMA THERAPY IP CONSULT  SOCIAL WORK IP CONSULT  NUTRITION SERVICES ADULT IP CONSULT  PHYSICAL THERAPY ADULT IP CONSULT  OCCUPATIONAL THERAPY ADULT IP CONSULT  NURSING TO CONSULT FOR VASCULAR ACCESS CARE IP CONSULT  SOCIAL WORK IP CONSULT  NUTRITION SERVICES ADULT IP CONSULT  PHYSICAL THERAPY ADULT IP CONSULT  OCCUPATIONAL THERAPY ADULT IP  CONSULT  NUTRITION SERVICES ADULT IP CONSULT  OCCUPATIONAL THERAPY ADULT IP CONSULT  PHYSICAL THERAPY ADULT IP CONSULT  PHARMACY IP CONSULT  SOT MEDICATION HISTORY IP PHARMACY CONSULT  PHYSICAL THERAPY ADULT IP CONSULT  OCCUPATIONAL THERAPY ADULT IP CONSULT  PHARMACY IP CONSULT  PHARMACY CRRT IP CONSULT  PHARMACY CRRT IP CONSULT  PHARMACY CRRT IP CONSULT  CARE MANAGEMENT / SOCIAL WORK IP CONSULT  PHARMACY CRRT IP CONSULT  NUTRITION SERVICES ADULT IP CONSULT  NUTRITION SERVICES ADULT IP CONSULT  PHARMACY IP CONSULT  PSYCHIATRY IP CONSULT  PSYCHIATRY IP CONSULT  SPEECH LANGUAGE PATH ADULT IP CONSULT  INTERVENTIONAL RADIOLOGY ADULT/PEDS IP CONSULT  PAIN MANAGEMENT ADULT IP CONSULT  NURSING TO CONSULT FOR VASCULAR ACCESS CARE IP CONSULT  PHARMACY IP CONSULT  NURSING TO CONSULT FOR VASCULAR ACCESS CARE IP CONSULT  NURSING TO CONSULT FOR VASCULAR ACCESS CARE IP CONSULT  PHARMACY LIAISON FOR MEDICATION COVERAGE CONSULT  NURSING TO CONSULT FOR VASCULAR ACCESS CARE IP CONSULT    Discharge Orders       Resume Home Care Services    [facility] - resume home RN/PT/OT  Ph: [phone]   Fax: [phone]       Resume Home Care Services     Activity    Walk at least four times a day, lift no greater than 10 pounds for 8 weeks from the time of surgery.  No driving while taking narcotics or 3 weeks after surgery.     Reason for your hospital stay    Liver transplant surgery  Acute kidney injury, required dialysis     Monitor and record    blood glucose 4 times a day, before meals and at bedtime  weight every day.  Call your coordinator if you experience a 2 lb weight gain or weight loss within a 24 hour period.     When to contact your care team    WHEN TO CONTACT YOUR  COORDINATOR:     Transplant Coordinator [phone]     Notify your coordinator if you have pain over your liver, increased redness or drainage from your incision, fever greater than 100F, or yellowing of skin or eyes.     Notify your coordinator immediately if you  are ever unable to take your immunosuppressive medications for any reason.     If you have URGENT concerns after office hours, please call the hospital switchboard at [phone] and ask to have the organ transplant nurse on-call paged. If you have a life-threatening emergency, go to the nearest emergency room.     Wound care and dressings    If you have staples in place, they will be removed in 3 weeks after operation. Wash incision daily with soap and water. Do not soak or scrub.     Adult [facility] Follow-up and recommended labs and tests    [facility] FOLLOW UP:     1. Follow up in Transplant Clinic weekly, Starting 3/20 for the next 5 weeks with Dr. Romeo Acevedo  (or any available liver transplant surgeon).     2. Follow up with Transplant Hepatology in 3 months.     3. Follow up with Nephrology in 1 month.    4. Follow up with Endocrinologist or Primary care provider for DM management within1 week.     5. Continue dialysis MWF, starting 3/22/23.    Call your Transplant Coordinator ([phone]) with questions about Transplant Center appointment scheduling.       LABS:     CBC, BMP, magnesium, phosphorus, hepatic panel, tacrolimus level every Monday and Thursday by home health care nurse if arranged, or at an outpatient lab.         Appointments on [place] and/or [facility] (with [facility] or [facility] provider or service). Call [phone] if you haven't heard regarding these appointments within 7 days of discharge.     Walker Order for DME - ONLY FOR DME    I, the undersigned, certify that the above prescribed supplies are medically necessary for this patient and is both reasonable and necessary in reference to accepted standards of medical and necessary in reference to accepted standards of medical practice in the treatment of this patient's condition and is not prescribed as a convenience.      Cane Order for DME - ONLY FOR DME    I, the undersigned, certify that the above prescribed  supplies are medically necessary for this patient and is both reasonable and necessary in reference to accepted standards of medical and necessary in reference to accepted standards of medical practice in the treatment of this patient's condition and is not prescribed as a convenience.      Diet    Diet recommendations post-transplant: Regular, Heart healthy dietary habits long term (low saturated/trans fat, low sodium). High protein diet x 8 weeks. Practice food safety precautions.     Discharge Medications    Discharge Medication List as of 3/19/2023  3:50 PM        START taking these medications    Details   aspirin (ASA) 325 MG tablet 1 tablet (325 mg) by Per Feeding Tube route daily for 30 days, Disp-30 tablet, R-2, E-Prescribe      Lidocaine (LIDOCARE) 4 % Patch Place 2 patches onto the skin every 24 hours To prevent lidocaine toxicity, patient should be patch free for 12 hrs daily.Disp-15 patch, [id number]      magnesium oxide (MAG-OX) 400 MG tablet Take 1 tablet (400 mg) by mouth every 24 hours, Disp-30 tablet, R-0, E-Prescribe      methocarbamol (ROBAXIN) 500 MG tablet Take 1 tablet (500 mg) by mouth 3 times daily as needed for muscle spasms, Disp-15 tablet, R-0, E-Prescribe      metoprolol tartrate (LOPRESSOR) 25 MG tablet Take 1 tablet (25 mg) by mouth 2 times daily, Disp-60 tablet, R-1, E-Prescribe      multivitamin RENAL (RENAVITE RX/NEPHROVITE) 1 MG tablet Take 1 tablet by mouth daily, Disp-30 tablet, R-1, E-Prescribe      mycophenolate (GENERIC EQUIVALENT) 250 MG capsule Take 3 capsules (750 mg) by mouth 2 times daily, Disp-180 capsule, R-1, E-Prescribe      ondansetron (ZOFRAN ODT) 4 MG ODT tab Take 1 tablet (4 mg) by mouth every 6 hours as needed for nausea or vomiting, Disp-10 tablet, R-0, E-Prescribe      predniSONE (DELTASONE) 5 MG tablet Take 1 tablet (5 mg) by mouth daily, Disp-30 tablet, R-0, E-Prescribe      sulfamethoxazole-trimethoprim (BACTRIM) 400-80 MG tablet 1 tablet by Oral or  Feeding Tube route three times a week Increase as instructed per transplant team, Disp-30 tablet, R-1, E-Prescribe      tacrolimus (GENERIC EQUIVALENT) 1 MG capsule Take 2 capsules (2 mg) by mouth 2 times daily, Disp-120 capsule, R-1, E-Prescribe      ursodiol (ACTIGALL) 300 MG capsule Take 1 capsule (300 mg) by mouth 2 times daily, Disp-60 capsule, R-1, E-Prescribe      Vitamin D3 (CHOLECALCIFEROL) 25 mcg (1000 units) tablet 1 tablet (25 mcg) by Oral or Feeding Tube route daily, Disp-30 tablet, R-1, E-Prescribe           CONTINUE these medications which have CHANGED    Details   acetaminophen (TYLENOL) 325 MG tablet 2 tablets (650 mg) by Oral or Feeding Tube route every 8 hours as needed for mild pain or fever, Disp-100 tablet, R-0, E-Prescribe      FLUoxetine (PROZAC) 20 MG capsule Take 3 capsules (60 mg) by mouth At Bedtime, Disp-90 capsule, R-0, E-Prescribe      insulin glargine (LANTUS PEN) 100 UNIT/ML pen Inject 15 Units Subcutaneous every morning AND 10 Units At Bedtime., Disp-15 mL, R-0, E-PrescribeIf Lantus is not covered by insurance, may substitute Basaglar or Semglee or other insulin glargine product per insurance preference at same dose and freque ncy.        pantoprazole (PROTONIX) 40 MG EC tablet Take 1 tablet (40 mg) by mouth 2 times daily, Disp-60 tablet, R-0, E-Prescribe      tacrolimus (GENERIC EQUIVALENT) 0.5 MG capsule Take 1 capsule (0.5 mg) by mouth 2 times daily Take 1 cap by mouth twice daily as directed by Transplant Center for dose changes., Disp-60 capsule, R-0, E-Prescribe      valGANciclovir (VALCYTE) 450 MG tablet Take 1 tablet (450 mg) by mouth twice a week Increase to 450mg daily when directed by transplant team with improving renal function, Disp-30 tablet, R-2, E-PrescribeTake Tuesday and Saturday           CONTINUE these medications which have NOT CHANGED    Details   insulin aspart (NOVOLOG FLEXPEN) 100 UNIT/ML pen 1 unit per 10 grams of carb, plus additional units based on  following scale   For Pre-Meal  - 164 give 1 unit. For Pre-Meal  - 189 give 2 units. For Pre-Meal  - 214 give 3 units. For Pre-Meal  - 239 give 4 units. For Pre-Meal BG 2 40 - 264 give 5 units. For Pre-Meal  - 289 give 6 units. For Pre-Meal  - 314 give 7 units. For Pre-Meal  - 339 give 8 units. For Pre-Meal  - 364 give 9 units.  For Pre-Meal BG greater than or equal to 365 give 10 units, Disp-15 mL , R-3, No Print Out      Multiple Vitamins-Minerals (MULTIVITAMIN GUMMIES ADULT) CHEW Take 2 each by mouth daily, Historical      thiamine (B-1) 100 MG tablet Take 1 tablet (100 mg) by mouth in the morning., Disp-30 tablet, R-0, E-Prescribe           STOP taking these medications       bumetanide (BUMEX) 1 MG tablet Comments:   Reason for Stopping:         carvedilol (COREG) 6.25 MG tablet Comments:   Reason for Stopping:         folic acid (FOLVITE) 1 MG tablet Comments:   Reason for Stopping:         gabapentin (NEURONTIN) 100 MG capsule Comments:   Reason for Stopping:         lactulose (CEPHULAC) 10 GM packet Comments:   Reason for Stopping:         oxyCODONE (ROXICODONE) 5 MG tablet Comments:   Reason for Stopping:         rifaximin (XIFAXAN) 550 MG TABS tablet Comments:   Reason for Stopping:         spironolactone (ALDACTONE) 100 MG tablet Comments:   Reason for Stopping:             Allergies   No Known Allergies  Data   Results for orders placed or performed during the hospital encounter of 02/11/23   US Abdomen Complete w Doppler Complete    Narrative    EXAMINATION: US ABDOMEN COMPLETE WITH DOPPLER COMPLETE 2/12/2023 8:10  AM     COMPARISON: CT abdomen and pelvis 1/25/2023. Abdominal ultrasound  1/24/2023.    HISTORY: Decompensated cirrhosis, evaluate for portal venous  thrombosis and ascites.    TECHNIQUE: The abdomen was scanned in standard fashion with  specialized ultrasound transducer(s) using both gray-scale, color  Doppler, and spectral flow  techniques.    Findings:  Liver: The liver demonstrates diffusely increased echogenicity of the  liver parenchyma measuring up to 18.6 cm. No evidence of a focal  hepatic mass.     Extrahepatic portal vein flow is antegrade at 35 cm/s.  Right portal vein flow is antegrade, measuring 19 cm/s.  Left portal vein flow is antegrade, measuring 32 cm/s.    Flow in the hepatic artery is towards the liver and:  94 cm/s peak systolic  0.56 resistive index.     The splenic vein is patent and flow is towards the liver.  The left,  middle, and right hepatic veins are patent with flow towards the IVC.  The IVC is patent with flow towards the heart.   The visualized aorta  is not dilated.    Gallbladder: Trace cholelithiasis versus sludge layering within the  gallbladder lumen. There is no wall thickening, pericholecystic fluid,  or positive sonographic Vanessa's sign.    Bile Ducts: Both the intra- and extrahepatic biliary system are of  normal caliber.  The common bile duct measures 5.    Pancreas: Visualized portions of the head and body of the pancreas are  unremarkable.     Kidneys: Normal echotexture of the right kidney, without mass or  hydronephrosis. Limited assessment of the left kidney.   Renal  lengths: right- 9.3 cm.    Spleen: The spleen measures 15.9 cm in length.    Fluid: No evidence of ascites or pleural effusions.      Impression    Impression:   1.  Patent Doppler evaluation of the hepatic vasculature.  2.   Cirrhotic morphology of the liver. Hepatomegaly.  3. Layering cholelithiasis/sludge within the gallbladder lumen. No  sonographic findings to suggest acute cholecystitis.  4. No sonographic evidence of ascites.    I have personally reviewed the examination and initial interpretation  and I agree with the findings.    DEMETRICE BLACK MD         SYSTEM ID:  [id number]   XR Abdomen Port 1 View    Narrative    EXAM: XR ABDOMEN PORT 1 VIEW  2/12/2023 1:16 PM     HISTORY:  Verify small bowel feeding tube bedside  placement       TECHNIQUE: Single frontal radiograph of the abdomen    COMPARISON:  CT abdomen and pelvis 1/25/2023    FINDINGS:   Probably be supine radiograph of the abdomen. Gastric tube tip and  sidehole projected over the proximal stomach. Nonobstructive bowel gas  pattern. No pneumatosis, portal venous gas.      Impression    IMPRESSION: Gastric tube tip and sidehole projecting over the proximal  stomach. No feeding tube identified..     I have personally reviewed the examination and initial interpretation  and I agree with the findings.    DEMETRICE BLACK MD         SYSTEM ID:  [id number]   XR Chest Port 1 View    Narrative    Exam: XR CHEST PORT 1 VIEW, 2/22/2023 4:02 AM    Comparison: 1/25/2023    History: SOB, increasing O2 requirements    Findings:  Single AP upright view of the chest.    Trachea is midline. Mediastinum is within normal limits.  Cardiopulmonary silhouette is within normal limits. Perihilar and  bibasilar partial airspace opacities. There is no pneumothorax or  pleural effusion. The upper abdomen is unremarkable.      Impression    Impression: Perihilar and bibasilar interstitial and airspace  opacities likely represent edema/atelectasis. Infection is in the  differential.    I have personally reviewed the examination and initial interpretation  and I agree with the findings.    CHULA POTRILLO MD         SYSTEM ID:  [id number]   XR Chest 2 Views    Narrative    Chest 2 views    INDICATION: Preoperative    COMPARISON: 2/22/2023    FINDINGS: Similar to minimally decreased bilateral infrahilar  opacities which could represent edema or inflammation/infection. Heart  size normal.      Impression    IMPRESSION: Slightly improved aeration of lung bases which could  represent slightly improved edema, inflammation/infection or  atelectasis.    JAYLIN CURTIS MD         SYSTEM ID:  [id number]   XR Chest 2 Views    Narrative    Chest 2 views    INDICATION: Preoperative.    COMPARISON:  2/24/2023    Findings: Heart size upper normal. Patchy densities in the right lung  appear unchanged. This may indicate mild edema or infection. Lateral  view grossly unchanged.      Impression    IMPRESSION: Continued asymmetric edema or infection in the right lung  compared to the left not significantly changed from 2/24/2023.    JAYLIN CURTIS MD         SYSTEM ID:  [id number]   XR Chest Port 1 View    Narrative    EXAM: XR CHEST PORT 1 VIEW  2/28/2023 5:43 PM      HISTORY: eval central line    COMPARISON: Chest x-ray 2/27/2023    FINDINGS: Portable supine AP radiograph of the chest. Right IJ  Cairo-Ronna catheter tip projects over the proximal left pulmonary  artery. The tip of the endotracheal tube projects over the midthoracic  trachea. The sidehole of the enteric tube projects near the GE  junction and the tip extends below the field of view.    The trachea is midline. No pleural effusion or pneumothorax. Patchy  opacities throughout the right lung are similar to prior. Slightly  increased mild retrocardiac opacity, likely atelectasis. The  visualized upper abdomen is unremarkable.      Impression    IMPRESSION:   1. Right IJ Cairo-Ronna catheter tip projects over the proximal left  pulmonary artery.  2. Stable mild patchy opacities throughout the right lung, likely  infection versus pulmonary edema.    I have personally reviewed the examination and initial interpretation  and I agree with the findings.    TERRI ROGERS MD         SYSTEM ID:  [id number]   US Liver Transplant    Narrative    Exam:  US LIVER TRANSPLANT  on  2/28/2023 5:50 PM     History:  s/p liver transplant     Comparison: 2/12/2023 US abdominal complete    Findings:  There is no free fluid in the abdomen. 7.2 x 1.8 x 7.3  hypoechogenic collection anterolateral to the right hepatic lobe.    LIVER:  The transplanted liver parenchyma, portal venous system,  hepatic venous system normal sized and echotexture. Common bile duct  are not seen. There  are no focal masses There is no intrahepatic  biliary dilatation.    GALLBLADDER: Surgically absent gallbladder. Common bile duct not seen.    PANCREAS:  The visualized pancreas was normal in appearance.    KIDNEY:  The right kidney measures 10.9 cm in length. There is no  hydronephrosis and no hydroureter. Left kidney not seen.    SPLEEN:  The spleen measures 14.2  cm and has normal echotexture.    BLADDER: Partially distended with anechoic fluid. There is no bladder  wall thickening.    DOPPLER:  There is flow towards the liver in the portal venous system.      Portal Venous System Flow is:  45 cm/sec towards the liver in the splenic vein.   62 cm/sec towards the liver  in the extrahepatic portal vein.  83 cm/sec towards the liver in the portal vein at anastomosis.  92 cm/sec towards the liver in the intrahepatic portal vein.  77 cm/sec towards the liver in the right portal vein.  34 cm/sec towards the liver  in the left portal vein.    IVC is patent and flow towards the heart.    Flow in hepatic vein is:  23 cm/sec towards the IVC in the right hepatic vein   17 cm/sec towards the IVC in the middle hepatic vein  29 cm/sec towards the IVC in the left hepatic vein    Flow in the hepatic artery is:  45 cm/sec peak systolic, 0 cm/sec minimum diastolic and towards the  liver with   1.00 resistive index.   Left hepatic artery: 36 cm/sec with resistive index 0.64.  Right hepatic artery: 32 cm/sec with resistive index 1.      Impression    Impression:   1. 7.2 x 1.8 x 7.3 right perihepatic hematoma.  2. Patent hepatic vasculature with normal directional flow. Elevated  resistive index in the common hepatic and right hepatic arteries is  nonspecific in the setting of recent liver transplant. Recommend  follow-up.    I have personally reviewed the examination and initial interpretation  and I agree with the findings.    TERRI ROGERS MD         SYSTEM ID:  [id number]   XR Abdomen Port 1 View    Narrative    EXAM: XR ABDOMEN  PORT 1 VIEW  2/28/2023 5:47 PM     HISTORY:  OG placement       TECHNIQUE: Single frontal radiograph of the abdomen    COMPARISON:  2/12/2023    FINDINGS:   Single AP portable supine view of the abdomen. Enteric tube with tip  and sidehole projecting over the stomach. Biliary drain. Right and  left abdominal surgical drains. Surgical staples project over the  upper abdomen..    Nonobstructive bowel gas pattern. No pneumatosis, portal venous gas.      Impression    IMPRESSION:   1. Enteric tube with tip and sidehole projecting over the stomach.  2. Nonobstructive bowel gas pattern.    I have personally reviewed the examination and initial interpretation  and I agree with the findings.    TERRI ROGERS MD         SYSTEM ID:  [id number]   XR Abdomen Port 1 View    Narrative    EXAM: XR ABDOMEN PORT 1 VIEW  2/28/2023 9:20 PM     HISTORY:  og tube adjustment       TECHNIQUE: Single frontal radiograph of the abdomen    COMPARISON:  2/28/2023 at 1721    FINDINGS:   Single AP portable view of the abdomen. Enteric tube with tip and  sidehole projecting over the stomach, similar to prior. Right and left  surgical abdominal drains. Biliary stent.    Nonobstructive bowel gas pattern. No pneumatosis, portal venous gas.      Impression    IMPRESSION:  1. Enteric tube with tip and sidehole projecting over the stomach,  similar to prior.  2. Nonobstructive bowel gas pattern.    I have personally reviewed the examination and initial interpretation  and I agree with the findings.    CHULA PORTILLO MD         SYSTEM ID:  [id number]   XR Abdomen Port 1 View    Narrative    EXAM: XR ABDOMEN PORT 1 VIEW  2/28/2023 9:21 PM     HISTORY:  re-advancement of tube       TECHNIQUE: Single frontal radiograph of the abdomen    COMPARISON:  2/28/2023 at 2058    FINDINGS:   Single AP portable supine view of the abdomen. Enteric tube with tip  and sidehole projecting over the stomach, similar to prior. Biliary  drain. Right and left abdominal drains.       Impression    IMPRESSION:   Enteric tube tip and sidehole projecting over the stomach.    I have personally reviewed the examination and initial interpretation  and I agree with the findings.    CHULA PORTILLO MD         SYSTEM ID:  [id number]   XR Abdomen Port 1 View    Narrative    Exam: XR ABDOMEN PORT 1 VIEW, 3/1/2023 10:43 AM    Indication: s/p R femoral dialysis line placement    Comparison: 2/28/2023    Findings:     Single AP portable supine view of the abdomen. Status post right  femoral dialysis line placement. Partially visualized surgical drains  and biliary drain. Negron catheter in appropriate  position.Nonobstructive bowel gas pattern. Surgical changes of liver  transplant.    No suspicious osseous lesions. Coarse tubular calcifications  projecting over left of midline hemipelvis likely represent vascular  calcifications.        Impression    Impression: Interval right femoral dialysis line placement.    I have personally reviewed the examination and initial interpretation  and I agree with the findings.    TAD WORRELL MD         SYSTEM ID:  [id number]   US Liver Transplant Follow Up    Narrative    EXAMINATION: US LIVER TRANSPLANT FOLLOW UP, 3/1/2023 4:54 PM     COMPARISON: 2/28/2023    HISTORY: OR for hematoma evacuation    TECHNIQUE:  Gray-scale, color Doppler and spectral flow analysis.    FINDINGS:   There is no ascites. Bilateral chest effusions.    Liver:   The liver demonstrates normal homogeneous echotexture. No  evidence of a focal hepatic mass. There is a new fluid collection  anterior to the right lobe measuring 3.1 x 4.0 x 2.9 cm.    Bile Ducts: Both the intra- and extrahepatic biliary system are of  normal caliber.  The common bile duct measures 4.3 mm in diameter.    Gallbladder: The gallbladder is surgically absent.    Kidneys:   Right kidney: Poor visualization of the right kidney. 10.3 cm in long  axis dimension.  Left kidney: Poor visualization of the left kidney. 10.7 cm in  long  axis dimension.    Pancreas: Is not visualized.    Spleen:  The spleen is enlarged, measuring 15.3 cm.    Visualized portions of the aorta are unremarkable.    LIVER DOPPLER:  Splenic vein:  Patent continuous normal antegrade direction flow  towards the liver, 29 cm/sec.  Extrahepatic portal vein:  Patent continuous antegrade flow, 61  cm/sec.  Portal vein at anastomosis: Patent continuous antegrade flow, 85  cm/sec.  Intrahepatic portal vein:  Patent continuous antegrade flow, 90  cm/sec.  Right portal vein flow is antegrade, measuring 49 cm/sec.  Left portal vein flow is antegrade, measuring 31 cm/sec.    Inferior vena cava: patent with flow toward the heart throughout..  IVC above anastomosis:  65 cm/sec.  IVC at anastomosis:  35 cm/sec.  Intrahepatic IVC:  50 cm/sec.  Extrahepatic IVC:  84 cm/sec.    Right, mid, left hepatic veins: Patent with flow towards the inferior  vena cava.    Extrahepatic hepatic artery: Demonstrating minimal or no diastolic  flow with flow towards the liver. 33 cm/sec with resistive index 1.00.  Right hepatic artery: 25 cm/sec with resistive index 0.60.  Left hepatic artery: 25 cm/sec with resistive index 1.0.      Impression    Impression:     1. Resolution of previously described perihepatic hematoma with new  perihepatic hematoma measuring up to 4.0 cm inferior to the right lobe  of the liver.  2. Hepatic vasculature with normal directional flow. Unchanged  elevated resistive indices in the common hepatic and right hepatic  arteries is nonspecific in the setting of recent liver transplant.  Attention on follow-up.  3. Bilateral pleural effusions.  4. Splenomegaly.    I have personally reviewed the examination and initial interpretation  and I agree with the findings.    CHULA PORTILLO MD         SYSTEM ID:  [id number]   XR Abdomen Port 1 View    Narrative    EXAMINATION:  XR ABDOMEN PORT 1 VIEW 3/2/2023 12:42 PM.    COMPARISON: Radiograph 3/1/2023, 2/28/2023.    HISTORY:  Verify  small bowel feeding tube bedside placement    FINDINGS:   Portable AP view of the abdomen. Feeding tube tip projects over the  distal duodenum. Nonobstructive bowel gas pattern. No pneumatosis or  portal venous gas. Stable postsurgical changes of liver  transplantation. Abdominal drains in the right upper quadrant. Biliary  drain has migrated to the left lower quadrant.      Impression    IMPRESSION:   1. Feeding tube tip projects over the distal duodenum.  2. Biliary drain has migrated to the left lower quadrant.  3. Nonobstructive bowel gas pattern.     I have personally reviewed the examination and initial interpretation  and I agree with the findings.    J CARLOS SAVAGE MD         SYSTEM ID:  [id number]   XR Chest Port 1 View    Narrative    EXAM: XR CHEST PORT 1 VIEW 3/2/2023 12:41 PM      HISTORY: new line.    COMPARISON: Chest radiograph 2/28/2023.     TECHNIQUE: Portable AP chest radiograph at 30 degrees    FINDINGS: Endotracheal tube tip approximately 2.5 cm above the ana.  Left internal jugular [place]-Ronna catheter with tip projecting toward  the origin of the left pulmonary artery. Enteric tube with tip  projecting beyond the field-of-view.  Trachea is midline. Hazy left lower lobe pulmonary opacity obscuring  the diaphragm and costophrenic angle. Patchy opacities throughout the  right lung are similar to prior. Small bilateral pleural effusions. No  pneumothorax. The visualized upper abdomen is unremarkable.      Impression    IMPRESSION:   1. Support devices as described above.  2. Small new pleural effusions with overlying basilar  atelectasis/consolidation, left greater than right.         I have personally reviewed the examination and initial interpretation  and I agree with the findings.    DEMETRICE BLACK MD         SYSTEM ID:  [id number]   US Liver Transplant    Narrative    EXAMINATION: US LIVER TRANSPLANT, 3/3/2023 11:04 AM     COMPARISON: 3/1/2023, 2/28/2023.    HISTORY: With Doppler. Increased  ALT, rule out collection    TECHNIQUE:  Gray-scale, color Doppler and spectral flow analysis.    FINDINGS:   There is no ascites. Small right pleural effusion.    Liver: Overlying bowel gas.  The liver demonstrates normal homogeneous  echotexture. No evidence of a focal hepatic mass. Previously noted  fluid collection is not well visualized. There is a new complex fluid  collection present along the inferior right hepatic surface measuring  9.0 x 2.7 x 4.0 cm.    Bile Ducts: The intrahepatic biliary system is within normal limits  for size.  The common bile duct is not visualized.     Gallbladder: The gallbladder is surgically absent.    Kidneys:   The bilateral kidneys are not well seen. The right kidney measures  approximately 9.7 cm. The left kidney measures approximately 9.7 cm.  No hydronephrosis, shadowing stones or focal masses.    Pancreas: Not well visualized.    Spleen:  The spleen is within normal limits, measuring 13 cm.    Visualized portions of the aorta are unremarkable.    LIVER DOPPLER:  Splenic vein:  Patent continuous normal antegrade direction flow  towards the liver, 25 cm/sec.  Extrahepatic portal vein:  Patent continuous antegrade flow, 86  cm/sec.  Portal vein at anastomosis: Patent continuous antegrade flow, 125  cm/sec.  Intrahepatic portal vein:  Patent continuous antegrade flow, 100  cm/sec.  Right portal vein flow is antegrade, measuring 56 cm/sec.  Left portal vein flow is antegrade, measuring 50 cm/sec.    Inferior vena cava: patent with flow toward the heart throughout..  IVC at anastomosis:  92 cm/sec.  Intrahepatic IVC:  59 cm/sec.  Extrahepatic IVC:  67 cm/sec.    Right, mid, left hepatic veins: Patent with flow towards the inferior  vena cava.    Extrahepatic hepatic artery: Low diastolic flow with flow towards the  liver. 43 cm/sec with resistive index 1.0.  Right hepatic artery: 19 cm/sec with resistive index 1.0.  Left hepatic artery: 20 cm/sec with resistive index 1.0.       Impression    Impression:   1.  Normal grayscale appearance of the transplant liver.  2.  Resolution of the previously described perihepatic hematoma, with  a new perihepatic hematoma measuring up to 9.0 cm along the inferior  surface of the right hepatic lobe.  3.  Patent Doppler evaluation of the liver transplant. Elevated  resistive indices in the common, right, and left hepatic arteries.  Recommend continued attention on follow-up.  4.  Small right pleural effusion.  5.  Splenomegaly.    I have personally reviewed the examination and initial interpretation  and I agree with the findings.    JAYLIN CURTIS MD         SYSTEM ID:  [id number]   XR Chest Port 1 View    Narrative    Exam: XR CHEST PORT 1 VIEW, 3/4/2023 10:34 AM    Indication: Hypercarbia    Comparison: 3/2/2023 chest x-ray    Findings:   Upright, AP view the chest. Patient has been extubated. Right IJ  central venous catheters are stable in position. Superior approach  [place]-Ronna sheath has been removed. Low lung volumes. Cardiac  silhouette is enlarged. More prominent pulmonary venous congestion. No  pneumothorax. Stable size of right pleural effusion.      Impression    Impression:   1. Position of support devices as described in report.  2. Low lung volumes with increased size of cardiac silhouette and  prominent pulmonary venous congestion, concerning for pulmonary edema.    I have personally reviewed the examination and initial interpretation  and I agree with the findings.    TERRI ROGERS MD         SYSTEM ID:  [id number]   XR Chest Port 1 View    Narrative    Exam: XR CHEST PORT 1 VIEW, 3/5/2023 10:46 AM    Comparison: Chest x-ray 3/4/2023    History: hypoxia    Findings:  Single upright view of the chest. Right IJ CVC tip projects over the  right IJ. Enteric tube projects over the stomach. Trachea is midline.  Stable enlarged cardiac silhouette. Stable low lung volumes. Mild  improvement in pulmonary venous congestion and  streaky  perihilar/bibasilar opacities. No appreciable pneumothorax. Esther  project over the abdomen.      Impression    Impression:   Stable cardiomegaly with mild improvement in pulmonary edema.    I have personally reviewed the examination and initial interpretation  and I agree with the findings.    TERRI ROGERS MD         SYSTEM ID:  [id number]   XR Chest Port 1 View    Narrative    EXAM: XR CHEST PORT 1 VIEW  3/6/2023 2:34 PM      HISTORY: new line rewired    COMPARISON: Chest x-ray 3/5/2023    FINDINGS: Portable semiupright AP radiograph of the chest. Right IJ  central venous catheter tip projects over the right atrium. Additional  right IJ central venous catheter courses into the mid SVC and the tip  is obscured. The feeding tube courses inferior to the diaphragm. The  trachea is midline. The cardiac silhouette is stable. Small bilateral  pleural effusions. No pneumothorax. Increased bibasilar opacities. The  visualized upper abdomen is unremarkable.      Impression    IMPRESSION:   1. Right IJ central venous catheter tip projects over the right  atrium. Additional right IJ central venous catheter courses into the  mid SVC and the tip is obscured.   2. Small bilateral pleural effusions.  3. Increased bibasilar opacities, likely pulmonary edema/atelectasis.    I have personally reviewed the examination and initial interpretation  and I agree with the findings.    JAYLIN CURTIS MD         SYSTEM ID:  [id number]   XR Chest Port 1 View    Narrative    Exam: XR CHEST PORT 1 VIEW, 3/6/2023 4:14 PM    Comparison: Same day chest x-ray at 1421 hours    History: line drawn back 4 cm    Findings:  Single portable AP view of the chest. Support devices are not  significantly changed in position compared to prior. The right IJ  sheath tip projects over the right atrium. Another right IJ CVC tip  projects over the low SVC. Enteric tube courses over the stomach and  out of the field-of-view. The remainder of the chest  is unchanged  since same day x-ray at 1421 hours.      Impression    Impression: Support devices not significantly changed in position  compared to prior. The remainder of the chest is unchanged since same  day x-ray at 1421 hours.    I have personally reviewed the examination and initial interpretation  and I agree with the findings.    JAYLIN CURTIS MD         SYSTEM ID:  [id number]    Liver Transplant Follow Up Portable    Narrative    EXAMINATION: US LIVER TRANSPLANT FOLLOW UP PORTABLE, 3/7/2023 1:42 PM     COMPARISON: Liver transplant ultrasound 3/3/2023, 3/1/2023    HISTORY: Elevated LFTs    TECHNIQUE:  Grey-scale, color Doppler and spectral flow analysis.    FINDINGS:  Partially imaged small right pleural effusion. Multiple perihepatic  collections as follows:  -Right posterior, 6.6 x 3.4 x 4.1 cm (previously 9.1 x 3.7 x 4 cm)  -Right inferior, 1 x 1.8 x 2.2 cm  -Right inferior/anterior, 2.1 x 1 x 3.4 cm  -Left, 3 x 1.2 x 2.1 cm    Common bile duct measures 4 mm in diameter. Right kidney is normal in  appearance without hydronephrosis. Small volume ascites in the lower  abdomen/pelvis.    LIVER DOPPLER:  Splenic vein: Obscured by bowel gas  Extrahepatic portal vein:  Patent continuous antegrade flow, 82  cm/sec.  Portal vein at anastomosis: Patent continuous antegrade flow, 110  cm/sec.  Intrahepatic portal vein:  Patent continuous antegrade flow, 98  cm/sec.  Right portal vein flow is antegrade, measuring 43 cm/sec.  Left portal vein flow is antegrade, measuring 33 cm/sec.    Inferior vena cava patent with flow toward the heart throughout..  IVC at anastomosis:  55 cm/sec.  Intrahepatic IVC:  37 cm/sec.  IVC at inferior anastomosis:  50 cm/sec.  Extrahepatic IVC:  32 cm/sec.    Right, mid, left hepatic veins: Patent with flow towards the inferior  vena cava.    Extrahepatic hepatic artery: Low resistance waveform with flow towards  the liver. 66/22 cm/sec with resistive index 0.67.  Intrahepatic  hepatic artery: Low resistance waveform with flow towards  the liver. 28/9 cm/sec with resistive index 0.68.  Right hepatic artery: 27/7 cm/sec with resistive index 0.75.  Left hepatic artery: 27/9 cm/sec with resistive index 0.68.    Visualized portions of the aorta are unremarkable.      Impression    Impression:   1. Patent hepatic transplant vasculature with normal flow direction.  Hepatic artery resistive indices are now within normal limits.  2. Multiple perihepatic hematomas. Decreased size of the largest  hematoma posterior to the right hepatic lobe. Three additional smaller  hematomas identified.  3. Partially imaged small right pleural effusion.  4. Small volume ascites.    I have personally reviewed the examination and initial interpretation  and I agree with the findings.    J CARLOS SAVAGE MD         SYSTEM ID:  [id number]   IR CVC Tunnel Placement > 5 Yrs of Age    Narrative    Procedures 3/10/2023:  1. Ultrasound guidance for venous access  2. Placement centrally inserted catheter (age > 5 yrs.) tunneled, no  port, no pump  3. Fluoroscopic guidance placement    History: ASHISH after liver transplant     Comparisons: 3/6/2023    Operators: ELVA Swanson I, JARED SWANSON MD, attest that I was present for all critical  portions of the procedure and was immediately available to provide  guidance and assistance during the remainder of the procedure.    Fellow: ELVA Arvizu    Medications:   1. 50 mcg Fentanyl  2. 1 mg Versed  3. 1 % Lidocaine    Moderate sedation administered by the IR nurse at the supervision of  the attending. Vital signs and oxygenation continuously monitored. The  patient remained stable throughout the procedure.    Sedation time: 30 minutes    Fluoroscopy time: 4.1 min    Findings/procedure:     Prior to the procedure, both verbal and written informed consent  obtained from the patient.     Limited jugular ultrasound documented jugular vein patency. The right  neck and upper chest prepped and  draped in the usual sterile fashion.  Buffered 1% Lidocaine used for local analgesia.    Under ultrasound guidance, right internal jugular venotomy made with a  micropuncture needle. Image documenting the vein within the vein  saved.    Micropuncture needle exchanged over guidewire for the micropuncture  sheath under fluoroscopic guidance. Catheter length measured with the  0.018 guidewire. Micropuncture sheath saline locked.     19 cm tip to cuff 15.5 F catheter subcutaneously tunneled from the  right anterior chest to the right internal jugular venotomy site.  Micropuncture sheath exchanged over guidewire for the peel-away  sheath. Guidewire removed. Under fluoroscopic guidance, the catheter  placed through a peel-away sheath and positioned with its tip in the  right atrium. Both lumens flushed and aspirated adequately. Each lumen  heparin locked with 2 cc heparin solution. 2-0 nylon catheter  retaining suture and sterile dressing applied. Right internal jugular  venotomy site closed with Dermabond. No immediate complication.      Impression    Impression:  Uncomplicated image guided placement of right IJ tunneled central  venous catheter for dialysis. 19 cm tip to cuff 15.5 F  DuraFLow  tunneled catheter placed under fluoroscopic guidance with the tip in  the right atrium. Both lumens flushed, heparin locked and ready for  immediate use.    I have personally reviewed the examination and initial interpretation  and I agree with the findings.    JARED SWANSON MD         SYSTEM ID:  [id number]   CT Head w/o Contrast    Narrative    EXAM: CT HEAD W/O CONTRAST  3/9/2023 9:30 AM     HISTORY:  Fall post transplant on anticoagulation       COMPARISON:  None    TECHNIQUE: Using multidetector thin collimation helical acquisition  technique, axial, coronal and sagittal CT images from the skull base  to the vertex were obtained without intravenous contrast.   (topogram) image(s) also obtained and reviewed.    FINDINGS:  No  acute intracranial hemorrhage, mass effect, or midline shift. No  acute loss of gray-white matter differentiation in the cerebral  hemispheres. Ventricles are proportionate to the cerebral sulci. Clear  basal cisterns.    The bony calvaria and the bones of the skull base are normal. The  visualized portions of the paranasal sinuses and mastoid air cells are  clear. Grossly normal orbits.       Impression    IMPRESSION: No acute intracranial pathology.     I have personally reviewed the examination and initial interpretation  and I agree with the findings.    DEBRA ANAND MD         SYSTEM ID:  [id number]   MR Abdomen MRCP w/o & w Contrast    Narrative    Abdomen MRI/MRCP with and without contrast    INDICATION: Liver transplant. Elevated total bilirubin.    Contrast: 7.5 mL intravenous Gadavist    COMPARISON: Liver transplant ultrasound 3/7/2023. CT abdomen pelvis  1/25/2023. No prior available MRI of the abdomen.    FINDINGS: Liver transplantation. Prominent right pleural effusion. CSF  and cord signals appear normal. Intrinsic vertebral marrow and disc  signals appear unremarkable in this nonfocused evaluation. Spleen 13.1  cm in length without mass. Kidneys appear reasonable asymmetric and  unremarkable. Periportal edema. Pancreas atrophic. Perihepatic fluid  collections mostly posteriorly. Central abdominal fluid collection as  well near the left lobe of the transplanted liver measuring 2.5 cm in  greatest dimension. No masses in the transplanted liver. Small amount  of perisplenic fluid. Body wall edema may represent  anasarca/malnutrition or other third spacing. Common bile duct appears  nicely well-defined he has a low portion. However, there is a caliber  change which may represent surgical anastomosis related narrowing at  the upper portion of the common hepatic duct extending to the central  intrahepatic ducts, this is nicely demonstrated on coronal biliary  tree imaging series 14 images 37-42. No  evidence of pancreatic ductal  dilation.  The portal vein is nicely patent. SMV patent and splenic vein patent.  Pelvic artery grossly patent albeit small caliber. IVC grossly patent  collecting the hepatic veins (which appear patent) and emptying  unobstructed into the right atrium. Aorta normal size. No aneurysm.  Celiac axis and SMA grossly normal. Bilateral renal arteries grossly  normal. Nonenlarged aortocaval lymph node incidentally present.  Atelectasis associated with the right pleural effusion which is a  typical process. No significant signal drop out on out of phase  imaging compared with in phase imaging involving the transplanted  liver. No restricted diffusion within the liver.      Impression    IMPRESSION:  1. Liver transplant. Abrupt caliber change in the common bile duct  which may be related to anastomosis. There is narrowing of the common  hepatic duct and central intrahepatic ducts. ERCP may be necessary to  investigate this further. Mild periportal edema. Portal and hepatic  veins and IVC all appear normal.  2. Multiple perihepatic fluid collections.  3. Right pleural effusion and associated lung base atelectasis.  4. Gallbladder absent corresponding with liver transplant.    JAYLIN CURTIS MD         SYSTEM ID:  [id number]   XR Chest Port 1 View    Narrative    EXAM:  XR CHEST PORT 1 VIEW    INDICATION: R/O TB. on dialysis Needs updated imaging for OP dialysis  unit.    COMPARISON:  Chest x-ray 3/6/2023    FINDINGS:  Single AP view of the chest. Right chest tunnel line terminating over  the right atrium. Enteric tubing continues caudally out of view.    Cardiomediastinal silhouette within normal limits.  Possible cavitary  opacity overlying the medial posterior right sixth rib. No  pneumothorax.  Trace right pleural effusion. Vertical skin staples in  the upper abdomen. Otherwise unremarkable upper abdomen. No acute bony  lesions.      Impression    IMPRESSION:  1.  Possible cavitary  opacity overlying the medial posterior right  sixth rib. Further evaluation with CT chest suggested.  2.  Trace right pleural effusion.    I have personally reviewed the examination and initial interpretation  and I agree with the findings.    SHAYAN DAWKINS MD         SYSTEM ID:  [id number]   Echocardiogram Limited     Value    LVEF  60-65%    Narrative    [id number]  [id number]  [id number]  [id number]^TALIB^JUAN     [facility],[place]  Echocardiography Laboratory  [address] [place] [address]     Name: EYAL ROONEY  MRN: [id number]  : 1995  Study Date: 2023 03:06 PM  Age: 27 yrs  Gender: Male  Patient Location: [facility]  Reason For Study: Edema  Ordering Physician: JUAN MAYERS  Referring Physician: SHWETHA LEVY  Performed By: Ara Flowers     BSA: 1.9 m2  Height: 65 in  Weight: 173 lb  HR: 86  BP: 126/64 mmHg  ______________________________________________________________________________  Procedure  Limited Portable Echo Adult. Contrast Optison. Optison (NDC #6683-2431-76)  given intravenously. Patient was given 5 ml mixture of 3 ml Optison and 6 ml  saline. 4 ml wasted.  ______________________________________________________________________________  Interpretation Summary  Global and regional left ventricular function is normal with an EF of 60-65%.  Global right ventricular function is normal.  No significant valvular abnormalities present.  IVC diameter >2.1 cm collapsing <50% with sniff suggests a high RA pressure  estimated at 15 mmHg or greater.  No pericardial effusion is present.  Compared to prior, CVP is higher, no other change.  ______________________________________________________________________________  Left Ventricle  Global and regional left ventricular function is normal with an EF of 60-65%.  Left ventricular wall thickness is normal. Left ventricular size is normal.     Right Ventricle  Global right ventricular function is normal.      Mitral Valve  The mitral valve is normal.     Aortic Valve  On Doppler interrogation, there is no significant stenosis or regurgitation.     Tricuspid Valve  Mild tricuspid insufficiency is present. The right ventricular systolic  pressure is approximated at 20.6 mmHg plus the right atrial pressure.     Pulmonic Valve  The pulmonic valve is normal.     Vessels  IVC diameter >2.1 cm collapsing <50% with sniff suggests a high RA pressure  estimated at 15 mmHg or greater.     Pericardium  No pericardial effusion is present.     Miscellaneous  No significant valvular abnormalities present.     Compared to Previous Study  No significant changes noted.  ______________________________________________________________________________  Doppler Measurements & Calculations  TR max rebeca: 226.7 cm/sec  TR max P.6 mmHg     ______________________________________________________________________________  Report approved by: Sofya Israel 2023 04:03 PM         Dobutamine Stress Echocardiogram    Narrative    [id number]  [id number]  [id number]  [id number]^ASHLEY^CATHERINE     [facility],[place]  Echocardiography Laboratory  [address] [place] [id number]     Name: EAYL ROONEY  MRN: [id number]  : 1995  Study Date: 2023 08:10 AM  Age: 27 yrs  Gender: Male  Patient Location: [facility]  Reason For Study: Transplant - Liver  Ordering Physician: CATHERINE RAMIREZ  Referring Physician: SHWETHA LEVY  Performed By: Ragini Boo     BSA: 1.9 m2  Height: 65 in  Weight: 176 lb  HR: 103  BP: 137/56 mmHg  ______________________________________________________________________________  ______________________________________________________________________________  Interpretation Summary  Normal dobutamine stress echocardiogram without evidence of inducible  ischemia. Target heart rate was achieved. Heart rate and blood pressure  response to dobutamine were normal. Normal LV  function and wall motion at  rest. With stress, the left ventricular ejection fraction increased from 55-  60% to greater than 65% and the left ventricular size decreased appropriately.  No regional wall motion abnormality with stress.  No subjective symptoms to suggest ischemia.  There was no ECG evidence of ischemia.  No significant valve disease on screening doppler evaluation. The aortic root  and visualized ascending aorta are normal.  ______________________________________________________________________________  Stress  The drug infusion was stopped due to maximum amount of medication given.  The patient did not exhibit any symptoms during drug infusion.  The maximum dose of dobutamine was 50mcg/kg/min.  The maximum dose of atropine was 2mg.  The maximum dose of metoprolol was 5mg.  Optison (NDC #4989-2027-78) given intravenously.  Patient was given 6 ml mixture of 3 ml Optison and 6 ml saline.  3 ml wasted.     Stress Results                                       Maximum Predicted HR:   191 bpm             Target HR: 162 bpm        % Maximum Predicted HR: 76 %                               Stage Heart Rate  BP   Dose                                     (bpm)                          Baseline    103   137/56 0.00                            Peak      146   161/4550.00                           Maximum Stress HR: 146 bpm     Procedure  Dobutamine stress echo with two dimensional color and spectral Doppler  performed. Contrast Optison.     ______________________________________________________________________________  MMode/2D Measurements & Calculations  asc Aorta Diam: 2.6 cm  RVOT diam: 1.9 cm     Doppler Measurements & Calculations  Ao V2 max: 197.6 cm/sec  Ao max PG: 15.6 mmHg  Ao V2 mean: 130.8 cm/sec  Ao mean P.0 mmHg  Ao V2 VTI: 34.1 cm  LV V1 max P.4 mmHg  LV V1 max: 104.7 cm/sec  LV V1 VTI: 19.9 cm  PA V2 max: 204.0 cm/sec  PA max P.7 mmHg  PA mean P.8 mmHg  PA V2 VTI: 40.3 cm  TR max  luca: 293.9 cm/sec  TR max P.5 mmHg  RVSP(TR): 37.5 mmHg     AV Luca Ratio (DI): 0.53  PVR: 157.2     ______________________________________________________________________________  Report approved by: Sofya Israel 2023 10:09 AM             Most Recent 3 CBC's:  Recent Labs   Lab Test 23  0740 23  0618 23  0546   WBC 8.0 7.1 10.4   HGB 9.3* 7.2* 7.4*   * 104* 102*    274 265     Most Recent 3 BMP's:  Recent Labs   Lab Test 23  0805 23  0740 23  0604 23  0646 23  0618 23  0759 23  0546   NA  --  129*  --   --  132*  --  125*   POTASSIUM  --  4.9  --   --  3.9  --  5.0   CHLORIDE  --  95*  --   --  95*  --  90*   CO2  --  23  --   --  27  --  25   BUN  --  59.9*  --   --  39.6*  --  81.4*   CR  --  2.58*  --   --  1.96*  --  3.18*   ANIONGAP  --  11  --   --  10  --  10   SARTHAK  --  9.5  --   --  9.2  --  9.4   * 129* 117*   < > 68*   < > 217*    < > = values in this interval not displayed.     Most Recent 2 LFT's:  Recent Labs   Lab Test 23  0740 2318   AST 22 18   ALT 10 13   ALKPHOS 269* 259*   BILITOTAL 1.1 1.0     Most Recent 3 INR's:  Recent Labs   Lab Test 03/10/23  0603 23  0134 23  0424   INR 1.11 1.19* 1.15     Most Recent Hemoglobin A1c:  Recent Labs   Lab Test 23  0006   A1C 5.2     Most Recent 6 glucoses:  Recent Labs   Lab Test 23  0805 23  0740 23  0604 23  0153 23  2104 23  1826   * 129* 117* 130* 144* 122*     Most Recent Urinalysis:  Recent Labs   Lab Test 23  1134   COLOR Dark Yellow*   APPEARANCE Cloudy*   URINEGLC 30*   URINEBILI Small*   URINEKETONE Negative   SG 1.027   UBLD Moderate*   URINEPH 5.0   PROTEIN 100*   NITRITE Negative   LEUKEST Small*   RBCU 20*   WBCU 112*

## 2023-03-16 NOTE — PROGRESS NOTES
"CLINICAL NUTRITION SERVICES - REASSESSMENT NOTE   Nutrition Prescription    RECOMMENDATIONS FOR MDs/PROVIDERS TO ORDER:  Recommend pt on average meets at least 65% minimum assessed needs (~1300 kcal, 65 g pro daily) via kcal cts prior to discontinuation of EN support and removal of FT.     Malnutrition Status:    Moderate malnutrition in the context of acute on chronic illness    Recommendations already ordered by Registered Dietitian (RD):  Continue cycled TF:  Novasource Renal (or equivalent) @ 50 ml/hr x 12 hrs, 8pm-8am (600 ml) + 1 pkts ProSource TF20 TID (3 pkts total) provides 1440 kcal (22 kcal/kg, or 73% minimum assessed kcal needs), 114 g pro (1.7 g/kg, which meets minimum assessed protiein needs), 110 g CHO, 430 ml free water, and 0 g fiber daily.     Continue kcal cts 3/16-3/18    Continue Vanilla Ensure as ordered TID    Future/Additional Recommendations:  Monitor kcal cts. If continues to eat minimally in the coming days, recommend resume full-nutrition EN support: NovaSource Renal @ 80 mL/hr x 12 hrs daily (960 mL/day) + 1 pkt ProSource TF20 to provide 2000 kcals (30 kcal/kg/day), 107 g PRO (1.6 g/kg/day), 688 mL H2O, 176 CHO and 0 gm Fiber daily.     EVALUATION OF THE PROGRESS TOWARD GOALS   Diet: Regular + Vanilla Ensure TID @ 10am, 2pm, & 8pm    Oral Intake: Attempted to meet with patient at bedside x3 attempts. Pt asleep during first visit and pt's mom requesting to allow pt to rest, then working with PT, then out of room upon final attempt. Charted on 3/14 to have eaten 50% of noodles and cheese from [employer] and \"some homemade food\" per RN flowsheets. Unable to obtain tolerance to PO.    3/10       Total Kcals: 0           Total Protein: 0g  Kcals from Hospital Food: 0                           Protein: 0g  Kcals from Outside Food (average):0            Protein: 0g  # Meals Ordered from Kitchen: 1 meal ordered   # Meals Recorded: no food intake recorded   # Supplements Recorded: 0     3/11    "    Total Kcals: 771       Total Protein: 33g  Kcals from Hospital Food: 771                                   Protein: 33g  Kcals from Outside Food (average): 0                       Protein: 0g  # Meals Ordered from Kitchen: 1 meal  # Meals Recorded: 1 meal ( 50% 2 pancakes w/ butter and syrup)  # Supplements Recorded: 100% 1 Ensure Enlive Vanilla, 50% 1 Ensure Enlive Vanilla    3/12       Total Kcals: 0           Total Protein: 0g  Kcals from Hospital Food: 0                           Protein: 0g  Kcals from Outside Food (average):0            Protein: 0g  # Meals Ordered from Kitchen: 1 meal ordered  # Meals Recorded: 0 meals/supplements recorded    3/13       Total Kcals: 70         Total Protein: 5g  Kcals from Hospital Food: 70                         Protein: 5g  Kcals from Outside Food (average):0            Protein: 0g  # Meals Ordered from Kitchen: 1 meal   # Meals Recorded: 1 meal - 25% deli sandwich w/ turkey & cheese)   # Supplements Recorded: 0    Enteral Access: NDT    FWF: 30 mL q4h    Nutrition Support: EN    3/2 - 3/7: Novasource Renal @ 40 ml/hr (960 ml) + 2 pkts prosource TF20 provides 2080 kcal (32kcal/kg), 127 g pro (1.9 gm/kg), 176 g CHO, 688 ml free water, and 0 g fiber daily.     3/7 - 3/9 : renal formula no longer indicated: Osmolite 1.5 Tej @ goal of  50ml/hr  (1200ml/day) + 2 pkts prosource TF20 will provide: 1960 kcals, 115 g PRO, 914 ml free H20, 244 g CHO, and 0 g fiber daily.     3/9-3/13: cycle: Osmolite 1.5 @ 65 ml/hr x 18 hours + 2 pkts Prosource TF 20 provides 1170 mL daily, 1915 kcal, 114 g protein, 239 g CHO, 892 mL free H20, 0 g fiber     3/13-Current: cycle: Novasource Renal (or equivalent) @ 50 ml/hr x 12 hrs, 8pm-8am (600 ml) + 1 pkts ProSource TF20 TID (3 pkts total) provides 1440 kcal (22 kcal/kg, or 73% minimum assessed kcal needs), 114 g pro (1.7 g/kg), 110 g CHO, 430 ml free water, and 0 g fiber daily.     Enteral Intake: Tolerating TF at goal rate per chart.   -->6-day  average enteral nutrition infusions: 568 mL TF + 1.83 ProSource protein packets = 1282 kcals (19 kcal/kg, or 65% minimum assessed kcal needs) and 88 g protein (1.3 g protein/kg, or 88% minimum assessed protein needs).     NEW FINDINGS   -Wt trends: Remains above admit wt. Continue dosing wt of 66 kg.  03/16/23 0000 70.1 kg (154 lb 8.7 oz) --   03/15/23 1616 69.6 kg (153 lb 7 oz) --   03/15/23 0111 72.2 kg (159 lb 3.2 oz) Standing scale   03/13/23 0919 72.4 kg (159 lb 9.6 oz) Standing scale   03/12/23 0734 72.8 kg (160 lb 6.4 oz) --   03/11/23 0814 70.8 kg (156 lb 1.6 oz) Standing scale   03/09/23 0500 72.3 kg (159 lb 6.4 oz) Bed scale   03/07/23 0200 75.8 kg (167 lb 1.7 oz) Bed scale   03/06/23 0400 78.5 kg (173 lb 1 oz) Bed scale   03/05/23 0400 78.9 kg (173 lb 15.1 oz) Bed scale   03/04/23 0400 80.8 kg (178 lb 2.1 oz) Bed scale   03/03/23 0000 82.8 kg (182 lb 8.7 oz) Bed scale   02/28/23 0400 80.5 kg (177 lb 7.5 oz) Standing scale   02/27/23 0038 79.9 kg (176 lb 2.4 oz) Standing scale   02/26/23 0000 78.6 kg (173 lb 4.5 oz) Standing scale   02/23/23 1000 78.2 kg (172 lb 6.4 oz) Standing scale   02/22/23 0500 79.9 kg (176 lb 2.4 oz) Standing scale   02/21/23 0530 80 kg (176 lb 5.9 oz) Standing scale   02/20/23 0644 78.9 kg (173 lb 15.1 oz) Standing scale   02/19/23 0613 76.9 kg (169 lb 8.5 oz) Standing scale   02/18/23 0152 73.6 kg (162 lb 4.1 oz) Standing scale   02/16/23 0030 70.3 kg (155 lb) Standing scale   02/15/23 0048 70.1 kg (154 lb 8.7 oz) Standing scale   02/14/23 0400 67.7 kg (149 lb 4.8 oz) Standing scale   02/13/23 0605 66.5 kg (146 lb 9.7 oz) Bed scale   02/12/23 0039 65.9 kg (145 lb 4.5 oz) --     -Labs: Reviewed, notable for:     K+ 4.3 (WNL)    Phos 3.5 (WNL)    -GI: 0-4 BMs daily over past week per I/Os, last BM today. On 1 pkt NS fiber TID.     -Renal: iHD MWF, had dialysis today.    -Skin: GRACE skin around bridle/FT d/t pt unavailable for visit x3 attempts.      -Meds & Vitamin/Mineral  Supplementation: Reviewed, notable for:     Lantus 15 units BID & 1 unit insulin per 15 g CHO with meals    Renavite    Vitamin D3    MALNUTRITION  % Intake: Decreased intake does not meet criteria  % Weight Loss: None noted/confounded by fluid - remains above admit wt  Subcutaneous Fat Loss: Upper arm:  severe and Lower arm:  severe - per previous RD note on 3/9 (unable to see today x3 attempts)  Muscle Loss: Thoracic region (clavicle, acromium bone, deltoid, trapezius, pectoral):  moderate, Upper arm (bicep, tricep):  moderate, Lower arm  (forearm):  moderate and Dorsal hand:  moderate - per previous RD note on 3/9  Fluid Accumulation/Edema: Mild (2+ bipedal edema per RN flowsheets)  Malnutrition Diagnosis: Moderate malnutrition in the context of acute on chronic illness    Previous Goals   Total avg nutritional intake to meet a minimum of 30 kcal/kg and 1.5 g PRO/kg daily (per dosing wt 66 kg).  Evaluation: Not met    Previous Nutrition Diagnosis  Inadequate oral intake related to low appetite as evidenced by patient consuming less than 50% of meals/snacks, reliant on TF to meet estimated nutrition needs.     Evaluation: No change    CURRENT NUTRITION DIAGNOSIS  Inadequate oral intake related to poor appetite as evidenced by kcal cts showing minimal to variable intake and continued reliance on EN support to meet majority of nutrition needs.     INTERVENTIONS  Implementation  -Enteral Nutrition - Continue as ordered    Goals  Total avg nutritional intake to meet a minimum of 30 kcal/kg and 1.5 g PRO/kg daily (per dosing wt 66 kg).    Monitoring/Evaluation  Progress toward goals will be monitored and evaluated per protocol.     Karlee Sim RD, LD  Pager: [phone]

## 2023-03-16 NOTE — PROGRESS NOTES
[facility]   Transplant Nephrology Progress Note  Date of Admission:  2/11/2023  Today's Date: 03/16/2023    Recommendations:  - Dialysis tomorrow.  Weight after dialysis yesterday was 69.6 kg.  Will aim for goal wt 69.0 kg tomorrow as tolerated.    Assessment & Plan   # ASHISH: Decreased with dialysis.  Remains oliguric. - ASHISH likely secondary to hemorrhagic shock and hemodynamic changes following liver transplant, as well as mild HRS prior to transplant.  - HD Info  Access: Right IJ Tunneled Catheter (placed 3/12), Days: MWF, Length: 4.0 hrs, EDW: 66.0 kg, Heparin: No, MARISEL: No, IV Iron: No, Vit D analog: No   - Baseline Creatinine: ~ 0.6-0.8   - Proteinuria: Normal (<0.2 grams)    # Liver Tx: ESLD secondary to alcoholic cirrhosis, s/p OLT 2/28/23.  Transaminases and total bilirubin now normal.  Followed by Transplant Surgery.    # Immunosuppression: Tacrolimus immediate release (goal 8-12), Mycophenolic acid (dose 360 mg every 12 hours) and Prednisone (dose 5 mg daily)   - Induction with Recent Transplant:  Per Liver Tx protocol.   - Changes: Not at this time; Management per Transplant Surgery.    # Infection Prophylaxis:   - PJP: Sulfa/TMP (Bactrim)  - CMV: Valganciclovir (Valcyte); CMV IgG Ab recipient and donor negative    # Blood Pressure: Borderline control;  Goal BP: < 140/90 (Hospitalization goal)   - Volume status: Total body volume up, but intravascularly euvolemic  EDW ~ 66.0 kg   - Changes: No     # Diabetes: Controlled (HbA1c <7%) Last HbA1c: 6.1%   - Management as per primary team.    # Anemia in Chronic Disease: Hgb: Stable, low      MARISEL: No   - Iron studies: Low iron saturation, but high ferritin (2/22/23)    # Mineral Bone Disorder:   - Vitamin D; level: Low (12/20/22)        On supplement: Yes, cholecalciferol 25 mcg PO daily.  - Calcium; level: Normal        On supplement: No  - Phosphorus; level: Normal        On binder: No    # Electrolytes:   -  Potassium; level: Normal        On supplement: No  - Magnesium; level: Normal        On supplement: Yes, magnesium oxide 400 mg PO QD  - Bicarbonate; level: Normal        On supplement: No    # Malnutrition: Patient on tube feedings.    # Diarrhea: Some loose stools.  Now started on fiber.  C. diff negative 3/5.    # EBV IgG Ab Discordance (D+/R-): Will do surveillance EBV PCR qmonth until 12 months post transplant, then q3 months until 2 years post transplant.    # Aspergers/Depression/Hallucinations: Patient appears to be having less hallucinations per his mother.  Seen by Psychiatry and started on olanzapine, as well as continuing on fluoxetine.    # Transplant History:  Etiology of Organ Failure: Alcoholic cirrhosis  Tx: Liver Tx  Transplant: 2/28/2023 (Liver)  Donor Type:  Donor Class:   Crossmatch at time of Tx: negative  DSA at time of Tx: No   Significant changes in immunosuppression: Lower CNI goal due to ASHISH  Significant transplant-related complications: ASHISH    Recommendations were communicated to the primary team verbally.    Patient dicussed with Dr. Mingo Machado APRN CNP   Pager: [phone]    Physician Attestation     I saw and evaluated Dillon Salter as part of a shared APRN/PA visit.     I personally reviewed the vital signs, medications and labs.    I personally performed the substantive portion of the medical decision making for this visit - please see the CHRISTA's documentation for full details.    Key management decisions made by me and carried out under my direction: Will plan HD on MWF schedule.  Trying to bring down dry weight.    Antonio Aguila MD  Date of Service (when I saw the patient): 03/16/23    Interval History   Mr. Salter's creatinine is 2.53 (03/16 0635); Decreased   Urine output appears remains low.  Normal transaminases and total bilirubin.,  Other significant labs/tests/vitals: Stable electrolytes.   No new events overnight.  No SOB on room air.  No chest  pain.  No N/V.  Some loose stools (on tube feeding).  Leg edema appears to be improving.    Review of Systems   4 point ROS was obtained and negative except as noted in the Interval History.    MEDICATIONS:    aspirin  325 mg Per Feeding Tube Daily     fiber modular (NUTRISOURCE FIBER)  1 packet Per Feeding Tube TID     FLUoxetine  60 mg Oral At Bedtime     heparin ANTICOAGULANT  5,000 Units Subcutaneous Q12H     insulin aspart   Subcutaneous TID w/meals     insulin glargine  15 Units Subcutaneous BID     magnesium oxide  400 mg Oral Q24H     melatonin  5 mg Oral QPM     metoprolol tartrate  25 mg Oral BID     multivitamin RENAL  1 tablet Oral Daily     mycophenolic acid  360 mg Oral BID IS     nystatin  500,000 Units Swish & Swallow 4x Daily     pantoprazole  40 mg Per Feeding Tube BID     predniSONE  5 mg Oral Daily     protein modular  1 packet Per Feeding Tube BID     sodium chloride (PF)  3 mL Intravenous Q8H     sulfamethoxazole-trimethoprim  1 tablet Oral or Feeding Tube Once per day on      tacrolimus  3 mg Oral BID IS     ursodiol  300 mg Oral BID     valGANciclovir  450 mg Oral Once per day on      cholecalciferol  25 mcg Oral or Feeding Tube Daily       dextrose Stopped (23 1544)     dextrose 1,000 mL (03/10/23 0753)       Physical Exam   Temp  Av.6  F (36.4  C)  Min: 92.8  F (33.8  C)  Max: 99.5  F (37.5  C)  Arterial Line BP  Min: 78/56  Max: 173/78  Arterial Line MAP (mmHg)  Av.4 mmHg  Min: 56 mmHg  Max: 190 mmHg      Pulse  Av.4  Min: 62  Max: 112 Resp  Avg: 15.7  Min: 8  Max: 34  FiO2 (%)  Av.5 %  Min: 30 %  Max: 100 %  SpO2  Av.6 %  Min: 84 %  Max: 100 %    CVP (mmHg): 8 mmHgBP 128/80 (BP Location: Right arm)   Pulse 79   Temp 98.8  F (37.1  C) (Oral)   Resp 16   Wt 70.1 kg (154 lb 8.7 oz)   SpO2 100%   BMI 25.72 kg/m     Date 23 0700 - 23 0659   Shift 9924-5188 0121-2063 6652-1479 24 Hour Total   INTAKE   I.V. 156   156   NG/GT 80    80   Enteral 160   160   Shift Total(mL/kg) 396(5.04)   396(5.04)   OUTPUT   Shift Total(mL/kg)       Weight (kg) 78.5 78.5 78.5 78.5      Admit Weight: 65.9 kg (145 lb 4.5 oz)     GENERAL APPEARANCE: Pt sleeping at time of visit.  Awakens to voice.  HENT: mouth without ulcers or lesions, NJ in place  RESP: lungs clear to auscultation - no rales, rhonchi or wheezes  CV: regular rhythm, normal rate, no rub, no murmur  EDEMA: 2+ LE and dependent edema bilaterally  ABDOMEN: soft, nondistended, mild TTP, bowel sounds normal  MS: extremities normal - no gross deformities noted, no evidence of inflammation in joints, no muscle tenderness  SKIN: no rash  DIALYSIS ACCESS:  Right IJ tunneled catheter     Data   All labs reviewed by me.  CMP  Recent Labs   Lab 03/16/23  1238 03/16/23  0903 03/16/23  0635 03/15/23  2207 03/15/23  1003 03/15/23  0635 03/14/23  1249 03/14/23  0722 03/13/23  1049 03/13/23  0649   NA  --   --  128*  --   --  128*  --  126*  --  126*   POTASSIUM  --   --  4.3  --   --  4.0  --  4.0  --  5.2   CHLORIDE  --   --  93*  --   --  90*  --  92*  --  92*   CO2  --   --  25  --   --  27  --  25  --  27   ANIONGAP  --   --  10  --   --  11  --  9  --  7   * 215* 242* 151*   < > 125*   < > 121*   < > 236*   BUN  --   --  54.0*  --   --  58.0*  --  34.6*  --  47.6*   CR  --   --  2.53*  --   --  2.89*  --  2.04*  --  2.41*   GFRESTIMATED  --   --  34*  --   --  29*  --  44*  --  36*   SARTHAK  --   --  9.4  --   --  9.7  --  9.2  --  9.0   MAG  --   --  1.9  --   --  2.0  --  1.7  --  1.8   PHOS  --   --  3.5  --   --  3.5  --  2.9  --  3.4   PROTTOTAL  --   --  5.8*  --   --  5.9*  --  5.5*  --  5.5*   ALBUMIN  --   --  3.3*  --   --  3.4*  --  3.1*  --  3.1*   BILITOTAL  --   --  1.1  --   --  1.2  --  1.3*  --  1.5*   ALKPHOS  --   --  311*  --   --  326*  --  312*  --  312*   AST  --   --  24  --   --  20  --  22  --  19   ALT  --   --  15  --   --  18  --  17  --  24    < > = values in this interval  not displayed.     CBC  Recent Labs   Lab 03/16/23  0635 03/15/23  0635 03/14/23  0722 03/13/23  0649   HGB 8.0* 8.5* 7.6* 7.6*   WBC 10.4 11.9* 9.2 8.1   RBC 2.51* 2.66* 2.37* 2.40*   HCT 26.2* 27.4* 24.8* 24.6*   * 103* 105* 103*   MCH 31.9 32.0 32.1 31.7   MCHC 30.5* 31.0* 30.6* 30.9*   RDW 20.1* 19.9* 20.5* 20.5*    234 213 198     INR  Recent Labs   Lab 03/10/23  0603   INR 1.11     ABG  No lab results found in last 7 days.   Urine Studies  Recent Labs   Lab Test 03/12/23  1134 02/27/23  1616 02/24/23  1818 02/22/23  1421   COLOR Dark Yellow* Yellow Light Yellow Yellow   APPEARANCE Cloudy* Clear Clear Clear   URINEGLC 30* Negative Negative Negative   URINEBILI Small* Negative Negative Negative   URINEKETONE Negative Negative Negative Negative   SG 1.027 1.012 1.009 1.010   UBLD Moderate* Negative Negative Negative   URINEPH 5.0 5.5 5.0 5.0   PROTEIN 100* 30* Negative Negative   NITRITE Negative Negative Negative Negative   LEUKEST Small* Negative Negative Negative   RBCU 20* <1 <1 1   WBCU 112* 2 1 2     No lab results found.  PTH  No lab results found.  Iron Studies  Recent Labs   Lab Test 02/22/23  0610 02/20/23  0730 02/16/23  0543 12/20/22  0703 10/06/22  0958 04/07/22  0624   IRON 26*  --   --  249*  --  85   *  --   --   --   --   --    IRONSAT 19  --   --   --   --   --    ASCENCION 1,465* 1,335* 1,725* 2,207* 2,042* 423*       IMAGING:  All imaging studies reviewed by me.

## 2023-03-16 NOTE — PROGRESS NOTES
CHW started [facility] referral for hemodialysis- they have availability MWF shift 3 put on range 01:45 PM - 04:00 PM.    Valerie Bhat   7A/B Community Health Worker   Phone: [phone]

## 2023-03-16 NOTE — PROGRESS NOTES
/80 (BP Location: Right arm)   Pulse 79   Temp 98.8  F (37.1  C) (Oral)   Resp 16   Wt 70.1 kg (154 lb 8.7 oz)   SpO2 100%   BMI 25.72 kg/m      Shift: 2905-8851  Isolation Status: NA  VSS on RA, afebrile  Neuro: Aox4  Behaviors: Pleasant & cooperative  B  Labs: Creatine 2.53 K+ 4.9, mag 1.9  Respiratory: WDL  Cardiac: WDL  Pain/Nausea: managed well with Tylenol  PRN: Oxycodone  Diet: Carb ct, TF at NOC, poor appetite  IV Access: PIV  Lines/Drains: TF 8 pm to 8am  GI/: Oliguric/ dialysis BM   Skin: Clamshell incision CHERYL staples, CDI  Mobility: Assist of 1 w/gait belt and walker  Plan: PT/OT encourage eating, continue POC

## 2023-03-16 NOTE — PROGRESS NOTES
Care Management Follow Up    Length of Stay (days): 33    Expected Discharge Date: 03/17/2023     Concerns to be Addressed: discharge planning     Patient plan of care discussed at interdisciplinary rounds: Yes    Anticipated Discharge Disposition: Home, Home Care     Anticipated Discharge Services: None  Anticipated Discharge DME: None    Patient/family educated on Medicare website which has current facility and service quality ratings: no  Education Provided on the Discharge Plan:    Patient/Family in Agreement with the Plan: yes    Referrals Placed by CM/SW:  CHW placed referral for Dialysis      Additional Information:  Met with patient's mother Leticia to introduce self/role. Explained that it is possible pt may discharge to home rather than ARU/TCU as PT and OT believe pt has been progressing with cares and this may be appropriate. Leticia expressed that she would prefer TCU/ARU but understood that home discharge may be necessary and appropriate. Writer informed Leticia that a chair had been reserved at [facility] in [place] for M, W, F at 01:45 PM - 04:00 PM. Leticia said this was very close to their home and she would be able to provide transportation. Leticia also confirmed that pt had previously had home care services with [facility] and they would be open to resuming cares with them. Writer called [facility] and they confirmed that pt would be able to resume services upon discharge.     [facility] - open for RN/PT/OT  Ph: [phone]   Fax: [phone]    [facility]  Phone: [phone]. Fax: [phone] [address] , [address] [place] [address]    [facility]  Covering for 7A  Phone [phone]    SEARCHABLE in [employer] - search CARE COORDINATOR    [place] ([phone]) Saturday & Sunday; ([phone]) [facility] Recognized Holidays    Units: 4A, 4C, 4E, 5A & 5B   Pager: [phone]    Units: 6A & 6B    Pager: [phone]    Units: 6C & 6D   Pager:  [phone]    Units: 7A, 7B, 7C, 7D & 5C    Pager: [phone]    Units: [facility] [place], 5 Ortho, 5 Med/Surg, 6 Med/Surg, 8A & 10 ICU

## 2023-03-16 NOTE — PROGRESS NOTES
Immunosuppression Management Note:    Dillon Salter is a 27 year old male who is seen today  for immunosuppression management     I, John Rondon MD, I have examined the patient with our CHRISTA/Fellow as part of a shared visit.   I participated in the rounds,  discussed and agree with the note and findings and  reviewed today's vital signs, medications, labs and imaging as noted in this note.  I  reviewed the  immunosuppression medications.  I personally provided a substantive portion of the care of this patient. I personally performed the immunosuppressive management of this patient, reviewed the overall  immunosuppression including drug levels, allograft function and provided the recommendations to adjust the dose to provide optimal levels to prevent rejection of the allograft and prevent toxicity to the organs. This was complex care due to the fresh allograft.   Time spent: evaluating patient, examining patient, discussion of plan, counseling and documentation: >35 min   I spoke to the patient/family and explained below clinical details and answered all the questions    Transplant Surgery  Inpatient Daily Progress Note  2023    Assessment & Plan: Dillon Salter is a 27 year old male with a past medical history of Asperger's, DM2, and alcoholic cirrhosis c/b esophageal varices and hepatic encephalopathy. He is now s/p  donor liver transplant without stent on 23 with Dr. Sterling. Return to OR for washout and repair of arterial bleeding on 3/1/23.    s/p DDLT 23: POD #16. Transaminases trending down appropriately after transplant until ~ POD #9 when IS subtherapeutic. Liver US with patent flow. MRCP 3/10 with abrubt caliber change in CBD. Narrowing of the common hepatic duct and central intrahepatic ducts.    Total bilirubin has normalized, but alkaline phosphatase remained elevated in the mids 300s. AST and ALT wnl.   - ASA 325mg daily  - Ursodiol 300mg BID    Imaging:  -3/1 US:  resolution of hematoma, patent vessels  -3/3 US: Patent, resolution of perihepatic hematoma with new perihepatic hematoma up to 9 cm.  -3/7 US with good flow.   -3/10 MRCP:   1. Liver transplant. Abrupt caliber change in the common bile duct  which may be related to anastomosis. There is narrowing of the common hepatic duct and central intrahepatic ducts. ERCP may be necessary to investigate this further. Mild periportal edema. Portal and hepatic veins and IVC all appear normal.  2. Multiple perihepatic fluid collections.  3. Right pleural effusion and associated lung base atelectasis.  4. Gallbladder absent corresponding with liver transplant.    Immunosuppression management:  Induction: Steroid taper and basiliximab x2 per renal sparing protocol.  Maintenance:   - mg BID --> reduced to 500mg BID in setting of diarrhea. Changed to Myfortic 360 mg BID 3/8.   -Tacrolimus 3mg BID; goal level 8-10  -Prednisone 5mg daily    Neuro/Psych:  Fall: Mechanical fall with head trauma (but no LOC) on 3/9 AM in the setting of frailty and opioid use. CTH without bleeding. Fall precautions.   Acute post op pain in the setting of chronic musculoskeletal + neuropathic pain pain: On oxycodone PTA for neuropathy since ~ 8/2022. Previously had tried gabapentin without benefit.   - Reduced Oxycodone 5mg to q8h PRN on 3/15, reduce to 2.5-5 mg q12h prn on 3/16/2023  - Encourage tylenol use prior to opioid use  - Robaxin 500mg q6h PRN  - Minimize narcotic use due to sedation and falls  * Pain consult placed for non-opioid management of chronic + neuropathic pain; no indication for chronic opioid use - recommended topical agents and taper of Oxycodone.   MDD, Anxiety, Autism spectrum: PTA fluoxetine. Reduced zyprexa to 2.5mg BID PRN  Acute delirium: Post operatively had ICU delirium with hallucinations. Continue to minimize narcotics as able.  Zyprexa as above    Hematology:   Leukocytosis: Noted to have a leukocytosis to 11.9 on 3/15  AM, but afebrile. Decreased to 10.4 today. Will closely monitor for s/sx of infection.   Anemia of chronic disease/Acute blood loss: Hgb stable ~7-9 without overt s/sx of blood loss.   Thrombocytopenia: PLT improving,~200    Cardiorespiratory:   Post op ventilatory support/hypercapnia/acute pulmonary edema: Extubated 3/3. Lethargic and hypercapnic 3/4 AM, improved with BiPAP. Now stable on ambient air.   Hemorrhagic shock: Secondary to bleeding. Resolved.   Tachycardia: Stable mild tachycardia up to 110s    GI/Nutrition:   Severe malnutrition in the context of acute illness: Nutrition consulted. FT in place, change EN Osmolite 1.5 @ 50/hr x12 +2 pkts Prosource. Continued calorie counts, none recorded yesterday.   Diarrhea: May be related to tube feeding vs cellcept. Changed to Myfortic. Changed EN 3/13. Cdiff neg, On Fiber TID. Improved.  Nausea: possibly related to EN. Reduced cycle feeds x12 hrs.     Endocrine:   DM2; Steroid induced hyperglycemia: initially managed with insulin gtt --> transitioned to sliding scale insulin. Lantus 15 units BID +sliding scale insulin PRN.       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 CRRT started 3/1/23 -- > transitioned to iHD 3/4. Tunneled line placed by IR on 3/10. HD per nephrology, plan to start MWF.   Hypervolemia: Volume removal with HD.    : anuric, monitor for renal recovery.    Prophylaxis: DVT (mechanical), fall, GI (PPI), fungal (Initially received fluconazole --> Micafungin d/t CRRT/re-operation --> nystatin), viral (Valcyte), pneumocystis (Bactrim -restarted 3/6), jalen-op (Zosyn)    Disposition: 7A    CHRISTA/Fellow/Resident Provider: Caren Guevara PA-C [id number]     Faculty: John Rondon M.D.    __________________________________________________________________  Transplant History:    2/28/2023 (Liver), Postoperative day: 16     Interval History:  No acute events  overnight. Tmax 99.4     Appetite continues to be poor - feels full. Poor sleep overnight.     ROS:   A 10-point review of systems was negative except as noted above.    Curent Meds:   aspirin  325 mg Per Feeding Tube Daily    fiber modular (NUTRISOURCE FIBER)  1 packet Per Feeding Tube TID    FLUoxetine  60 mg Oral At Bedtime    heparin ANTICOAGULANT  5,000 Units Subcutaneous Q12H    insulin aspart   Subcutaneous TID w/meals    insulin glargine  15 Units Subcutaneous BID    magnesium oxide  400 mg Oral Q24H    melatonin  5 mg Oral QPM    metoprolol tartrate  25 mg Oral BID    multivitamin RENAL  1 tablet Oral Daily    mycophenolic acid  360 mg Oral BID IS    nystatin  500,000 Units Oral 4x Daily    pantoprazole  40 mg Per Feeding Tube BID    predniSONE  5 mg Oral Daily    protein modular  1 packet Per Feeding Tube BID    sodium chloride (PF)  3 mL Intravenous Q8H    sulfamethoxazole-trimethoprim  1 tablet Oral or Feeding Tube Once per day on Mon Wed Fri    tacrolimus  3 mg Oral BID IS    ursodiol  300 mg Oral BID    valGANciclovir  450 mg Oral Once per day on Mon Fri    cholecalciferol  25 mcg Oral or Feeding Tube Daily       Physical Exam:     Admit Weight: 65.9 kg (145 lb 4.5 oz)    Current Vitals:   /75 (BP Location: Left arm)   Pulse 88   Temp 99.4  F (37.4  C) (Oral)   Resp 14   Wt 70.1 kg (154 lb 8.7 oz)   SpO2 100%   BMI 25.72 kg/m      Vital sign ranges:    Temp:  [97.8  F (36.6  C)-99.4  F (37.4  C)] 99.4  F (37.4  C)  Pulse:  [68-88] 88  Resp:  [9-25] 14  BP: (102-129)/(60-91) 129/75  SpO2:  [96 %-100 %] 100 %    General Appearance: No acute distress  Skin: warm, dry  Heart: RRR  Lungs: Breathing comfortably on ambient air  Abdomen: abdomen soft, non-distended. Incision c/d/i. Resolving eschar above incision site without overlying erythema and purulence.  Extremities: edema: 1+ Ma[place] edema bilaterally  Skin: No jaundice.   - RIJ dialysis tunneled line  Neurologic: A&Ox 3    Data:   CMP  Recent  Labs   Lab 03/16/23  0903 03/16/23  0635 03/15/23  1003 03/15/23  0635   NA  --  128*  --  128*   POTASSIUM  --  4.3  --  4.0   CHLORIDE  --  93*  --  90*   CO2  --  25  --  27   * 242*   < > 125*   BUN  --  54.0*  --  58.0*   CR  --  2.53*  --  2.89*   GFRESTIMATED  --  34*  --  29*   SARTHAK  --  9.4  --  9.7   MAG  --  1.9  --  2.0   PHOS  --  3.5  --  3.5   ALBUMIN  --  3.3*  --  3.4*   BILITOTAL  --  1.1  --  1.2   ALKPHOS  --  311*  --  326*   AST  --  24  --  20   ALT  --  15  --  18    < > = values in this interval not displayed.     CBC  Recent Labs   Lab 03/16/23  0635 03/15/23  0635 03/12/23  0721 03/12/23  0006   HGB 8.0* 8.5*   < >  --    WBC 10.4 11.9*   < >  --     234   < >  --    A1C  --   --   --  5.2    < > = values in this interval not displayed.

## 2023-03-16 NOTE — PLAN OF CARE
/75 (BP Location: Left arm)   Pulse 88   Temp 99.4  F (37.4  C) (Oral)   Resp 14   Wt 70.1 kg (154 lb 8.7 oz)   SpO2 100%   BMI 25.72 kg/m      SHIFT: 4884-2269  NEURO: Asperger's.  VITALS: AVSS on RA.  BG:   Recent Labs   Lab 03/15/23  2207 03/15/23  1753 03/15/23  1443 03/15/23  1003 03/15/23  0635 03/15/23  0603   * 120* 107* 131* 125* 154*     DIET: Regular diet. TF @ 50 mL/hr from 8p-8a.   MIVF/GTT/ABX: NA/  PAIN: Pain managed with tylenol, robaxin, and oxy.  : Voiding adequate amounts and no difficulty reported.  GI: Loose BMs, once overnight.  TUBES: Tunneled HD line, PIV SL  ASSIST: Ao1 / SBA w/ walker.  SAFETY: Bed alarm on. Pt calls appropriately.  SKIN: Midline incision stapled and CDI,  PSYCH/SOC: Mother at bedside, approved to stay overnights.   LABS: K of 4.0, Cr of 2.89 and trending up.  PLAN: Next dialysis likely Friday.  Goal to gradually bring down weight with ultrafiltration while avoiding hypotension.  DISCHARGE PLAN: Pending the goals of the POC

## 2023-03-17 ENCOUNTER — TELEPHONE (OUTPATIENT)
Dept: TRANSPLANT | Facility: CLINIC | Age: 28
End: 2023-03-17

## 2023-03-17 ENCOUNTER — APPOINTMENT (OUTPATIENT)
Dept: OCCUPATIONAL THERAPY | Facility: CLINIC | Age: 28
DRG: 005 | End: 2023-03-17
Attending: STUDENT IN AN ORGANIZED HEALTH CARE EDUCATION/TRAINING PROGRAM
Payer: COMMERCIAL

## 2023-03-17 ENCOUNTER — APPOINTMENT (OUTPATIENT)
Dept: GENERAL RADIOLOGY | Facility: CLINIC | Age: 28
DRG: 005 | End: 2023-03-17
Attending: NURSE PRACTITIONER
Payer: COMMERCIAL

## 2023-03-17 ENCOUNTER — APPOINTMENT (OUTPATIENT)
Dept: PHYSICAL THERAPY | Facility: CLINIC | Age: 28
DRG: 005 | End: 2023-03-17
Attending: STUDENT IN AN ORGANIZED HEALTH CARE EDUCATION/TRAINING PROGRAM
Payer: COMMERCIAL

## 2023-03-17 DIAGNOSIS — Z94.4 LIVER REPLACED BY TRANSPLANT (H): Primary | ICD-10-CM

## 2023-03-17 LAB
ALBUMIN SERPL BCG-MCNC: 3 G/DL (ref 3.5–5.2)
ALP SERPL-CCNC: 281 U/L (ref 40–129)
ALT SERPL W P-5'-P-CCNC: 14 U/L (ref 10–50)
ANION GAP SERPL CALCULATED.3IONS-SCNC: 10 MMOL/L (ref 7–15)
AST SERPL W P-5'-P-CCNC: 16 U/L (ref 10–50)
BACTERIA BLD CULT: NO GROWTH
BILIRUB SERPL-MCNC: 1 MG/DL
BUN SERPL-MCNC: 81.4 MG/DL (ref 6–20)
CALCIUM SERPL-MCNC: 9.4 MG/DL (ref 8.6–10)
CHLORIDE SERPL-SCNC: 90 MMOL/L (ref 98–107)
CREAT SERPL-MCNC: 3.18 MG/DL (ref 0.67–1.17)
DEPRECATED HCO3 PLAS-SCNC: 25 MMOL/L (ref 22–29)
ERYTHROCYTE [DISTWIDTH] IN BLOOD BY AUTOMATED COUNT: 19.8 % (ref 10–15)
GFR SERPL CREATININE-BSD FRML MDRD: 26 ML/MIN/1.73M2
GLUCOSE BLDC GLUCOMTR-MCNC: 155 MG/DL (ref 70–99)
GLUCOSE BLDC GLUCOMTR-MCNC: 156 MG/DL (ref 70–99)
GLUCOSE BLDC GLUCOMTR-MCNC: 214 MG/DL (ref 70–99)
GLUCOSE BLDC GLUCOMTR-MCNC: 225 MG/DL (ref 70–99)
GLUCOSE BLDC GLUCOMTR-MCNC: 228 MG/DL (ref 70–99)
GLUCOSE SERPL-MCNC: 217 MG/DL (ref 70–99)
HCT VFR BLD AUTO: 23.6 % (ref 40–53)
HGB BLD-MCNC: 7.4 G/DL (ref 13.3–17.7)
MAGNESIUM SERPL-MCNC: 2.1 MG/DL (ref 1.7–2.3)
MCH RBC QN AUTO: 32 PG (ref 26.5–33)
MCHC RBC AUTO-ENTMCNC: 31.4 G/DL (ref 31.5–36.5)
MCV RBC AUTO: 102 FL (ref 78–100)
PHOSPHATE SERPL-MCNC: 4.9 MG/DL (ref 2.5–4.5)
PLATELET # BLD AUTO: 265 10E3/UL (ref 150–450)
POTASSIUM SERPL-SCNC: 5 MMOL/L (ref 3.4–5.3)
PROT SERPL-MCNC: 5.3 G/DL (ref 6.4–8.3)
RBC # BLD AUTO: 2.31 10E6/UL (ref 4.4–5.9)
SODIUM SERPL-SCNC: 125 MMOL/L (ref 136–145)
TACROLIMUS BLD-MCNC: 10 UG/L (ref 5–15)
TME LAST DOSE: NORMAL H
TME LAST DOSE: NORMAL H
WBC # BLD AUTO: 10.4 10E3/UL (ref 4–11)

## 2023-03-17 PROCEDURE — 250N000013 HC RX MED GY IP 250 OP 250 PS 637: Performed by: SURGERY

## 2023-03-17 PROCEDURE — 250N000011 HC RX IP 250 OP 636

## 2023-03-17 PROCEDURE — 99233 SBSQ HOSP IP/OBS HIGH 50: CPT | Mod: FS

## 2023-03-17 PROCEDURE — 99232 SBSQ HOSP IP/OBS MODERATE 35: CPT | Mod: GC | Performed by: INTERNAL MEDICINE

## 2023-03-17 PROCEDURE — 250N000013 HC RX MED GY IP 250 OP 250 PS 637: Performed by: PHYSICIAN ASSISTANT

## 2023-03-17 PROCEDURE — 120N000011 HC R&B TRANSPLANT UMMC

## 2023-03-17 PROCEDURE — 250N000012 HC RX MED GY IP 250 OP 636 PS 637: Performed by: INTERNAL MEDICINE

## 2023-03-17 PROCEDURE — 97116 GAIT TRAINING THERAPY: CPT | Mod: GP

## 2023-03-17 PROCEDURE — 250N000012 HC RX MED GY IP 250 OP 636 PS 637: Performed by: PHYSICIAN ASSISTANT

## 2023-03-17 PROCEDURE — 97530 THERAPEUTIC ACTIVITIES: CPT | Mod: GP

## 2023-03-17 PROCEDURE — 85027 COMPLETE CBC AUTOMATED: CPT | Performed by: PHYSICIAN ASSISTANT

## 2023-03-17 PROCEDURE — 97535 SELF CARE MNGMENT TRAINING: CPT | Mod: GO

## 2023-03-17 PROCEDURE — 71045 X-RAY EXAM CHEST 1 VIEW: CPT

## 2023-03-17 PROCEDURE — P9047 ALBUMIN (HUMAN), 25%, 50ML: HCPCS

## 2023-03-17 PROCEDURE — 71045 X-RAY EXAM CHEST 1 VIEW: CPT | Mod: 26 | Performed by: RADIOLOGY

## 2023-03-17 PROCEDURE — P9045 ALBUMIN (HUMAN), 5%, 250 ML: HCPCS

## 2023-03-17 PROCEDURE — 250N000011 HC RX IP 250 OP 636: Performed by: INTERNAL MEDICINE

## 2023-03-17 PROCEDURE — 84100 ASSAY OF PHOSPHORUS: CPT | Performed by: PHYSICIAN ASSISTANT

## 2023-03-17 PROCEDURE — 83735 ASSAY OF MAGNESIUM: CPT | Performed by: PHYSICIAN ASSISTANT

## 2023-03-17 PROCEDURE — 36415 COLL VENOUS BLD VENIPUNCTURE: CPT | Performed by: PHYSICIAN ASSISTANT

## 2023-03-17 PROCEDURE — 80197 ASSAY OF TACROLIMUS: CPT | Performed by: PHYSICIAN ASSISTANT

## 2023-03-17 PROCEDURE — 97110 THERAPEUTIC EXERCISES: CPT | Mod: GP

## 2023-03-17 PROCEDURE — 258N000003 HC RX IP 258 OP 636

## 2023-03-17 PROCEDURE — 250N000011 HC RX IP 250 OP 636: Performed by: PHYSICIAN ASSISTANT

## 2023-03-17 PROCEDURE — 250N000012 HC RX MED GY IP 250 OP 636 PS 637: Performed by: NURSE PRACTITIONER

## 2023-03-17 PROCEDURE — 90937 HEMODIALYSIS REPEATED EVAL: CPT

## 2023-03-17 PROCEDURE — 250N000013 HC RX MED GY IP 250 OP 250 PS 637: Performed by: NURSE PRACTITIONER

## 2023-03-17 PROCEDURE — 250N000013 HC RX MED GY IP 250 OP 250 PS 637: Performed by: INTERNAL MEDICINE

## 2023-03-17 PROCEDURE — 80053 COMPREHEN METABOLIC PANEL: CPT | Performed by: PHYSICIAN ASSISTANT

## 2023-03-17 RX ORDER — ASPIRIN 325 MG
325 TABLET ORAL DAILY
Qty: 30 TABLET | Refills: 2 | Status: SHIPPED | OUTPATIENT
Start: 2023-03-18 | End: 2023-03-28

## 2023-03-17 RX ORDER — URSODIOL 300 MG/1
300 CAPSULE ORAL 2 TIMES DAILY
Qty: 60 CAPSULE | Refills: 1 | Status: SHIPPED | OUTPATIENT
Start: 2023-03-17 | End: 2023-03-28

## 2023-03-17 RX ORDER — PANTOPRAZOLE SODIUM 40 MG/1
40 TABLET, DELAYED RELEASE ORAL 2 TIMES DAILY
Status: DISCONTINUED | OUTPATIENT
Start: 2023-03-17 | End: 2023-03-19 | Stop reason: HOSPADM

## 2023-03-17 RX ORDER — VALGANCICLOVIR 450 MG/1
450 TABLET, FILM COATED ORAL
Qty: 30 TABLET | Refills: 2 | Status: SHIPPED | OUTPATIENT
Start: 2023-03-17 | End: 2023-03-19

## 2023-03-17 RX ORDER — METOPROLOL TARTRATE 25 MG/1
25 TABLET, FILM COATED ORAL 2 TIMES DAILY
Qty: 60 TABLET | Refills: 1 | Status: SHIPPED | OUTPATIENT
Start: 2023-03-17

## 2023-03-17 RX ORDER — VITAMIN B COMPLEX
25 TABLET ORAL DAILY
Qty: 30 TABLET | Refills: 1 | Status: SHIPPED | OUTPATIENT
Start: 2023-03-18 | End: 2023-03-28

## 2023-03-17 RX ORDER — LIDOCAINE 4 G/G
2 PATCH TOPICAL
Status: DISCONTINUED | OUTPATIENT
Start: 2023-03-17 | End: 2023-03-19 | Stop reason: HOSPADM

## 2023-03-17 RX ORDER — PREDNISONE 5 MG/1
5 TABLET ORAL DAILY
Qty: 30 TABLET | Refills: 0 | Status: SHIPPED | OUTPATIENT
Start: 2023-03-18 | End: 2023-03-28

## 2023-03-17 RX ORDER — SULFAMETHOXAZOLE AND TRIMETHOPRIM 400; 80 MG/1; MG/1
1 TABLET ORAL
Qty: 30 TABLET | Refills: 1 | Status: SHIPPED | OUTPATIENT
Start: 2023-03-17 | End: 2023-03-28

## 2023-03-17 RX ORDER — VIT B COMP NO.3/FOLIC/C/BIOTIN 1 MG-60 MG
1 TABLET ORAL DAILY
Qty: 30 TABLET | Refills: 1 | Status: SHIPPED | OUTPATIENT
Start: 2023-03-18 | End: 2023-03-28

## 2023-03-17 RX ORDER — MYCOPHENOLIC ACID 360 MG/1
720 TABLET, DELAYED RELEASE ORAL
Status: DISCONTINUED | OUTPATIENT
Start: 2023-03-17 | End: 2023-03-18

## 2023-03-17 RX ORDER — ALBUMIN (HUMAN) 12.5 G/50ML
50 SOLUTION INTRAVENOUS
Status: DISCONTINUED | OUTPATIENT
Start: 2023-03-17 | End: 2023-03-17

## 2023-03-17 RX ADMIN — MAGNESIUM OXIDE TAB 400 MG (241.3 MG ELEMENTAL MG) 400 MG: 400 (241.3 MG) TAB at 13:14

## 2023-03-17 RX ADMIN — METOPROLOL TARTRATE 25 MG: 25 TABLET, FILM COATED ORAL at 20:29

## 2023-03-17 RX ADMIN — URSODIOL 300 MG: 300 CAPSULE ORAL at 20:29

## 2023-03-17 RX ADMIN — ONDANSETRON 4 MG: 4 TABLET, ORALLY DISINTEGRATING ORAL at 21:58

## 2023-03-17 RX ADMIN — INSULIN GLARGINE 15 UNITS: 100 INJECTION, SOLUTION SUBCUTANEOUS at 20:30

## 2023-03-17 RX ADMIN — Medication 1 PACKET: at 07:50

## 2023-03-17 RX ADMIN — NYSTATIN 500000 UNITS: 100000 SUSPENSION ORAL at 20:30

## 2023-03-17 RX ADMIN — METHOCARBAMOL 500 MG: 500 TABLET ORAL at 00:29

## 2023-03-17 RX ADMIN — NYSTATIN 500000 UNITS: 100000 SUSPENSION ORAL at 13:14

## 2023-03-17 RX ADMIN — LIDOCAINE PATCH 4% 2 PATCH: 40 PATCH TOPICAL at 10:07

## 2023-03-17 RX ADMIN — B-COMPLEX W/ C & FOLIC ACID TAB 1 MG 1 TABLET: 1 TAB at 07:52

## 2023-03-17 RX ADMIN — MYCOPHENOLIC ACID 360 MG: 360 TABLET, DELAYED RELEASE ORAL at 07:53

## 2023-03-17 RX ADMIN — Medication 40 MG: at 07:51

## 2023-03-17 RX ADMIN — ALBUMIN HUMAN 250 ML: 0.05 INJECTION, SOLUTION INTRAVENOUS at 15:42

## 2023-03-17 RX ADMIN — INSULIN GLARGINE 15 UNITS: 100 INJECTION, SOLUTION SUBCUTANEOUS at 07:53

## 2023-03-17 RX ADMIN — TACROLIMUS 3 MG: 1 CAPSULE ORAL at 07:52

## 2023-03-17 RX ADMIN — ALBUMIN HUMAN 50 ML: 0.25 SOLUTION INTRAVENOUS at 17:39

## 2023-03-17 RX ADMIN — ASPIRIN 325 MG ORAL TABLET 325 MG: 325 PILL ORAL at 07:52

## 2023-03-17 RX ADMIN — HEPARIN SODIUM 2100 UNITS: 1000 INJECTION INTRAVENOUS; SUBCUTANEOUS at 15:49

## 2023-03-17 RX ADMIN — ACETAMINOPHEN 650 MG: 325 TABLET ORAL at 18:55

## 2023-03-17 RX ADMIN — PANTOPRAZOLE SODIUM 40 MG: 40 TABLET, DELAYED RELEASE ORAL at 20:30

## 2023-03-17 RX ADMIN — NYSTATIN 500000 UNITS: 100000 SUSPENSION ORAL at 07:51

## 2023-03-17 RX ADMIN — HEPARIN SODIUM 5000 UNITS: 5000 INJECTION, SOLUTION INTRAVENOUS; SUBCUTANEOUS at 13:14

## 2023-03-17 RX ADMIN — Medication: at 15:43

## 2023-03-17 RX ADMIN — Medication 2.5 MG: at 23:47

## 2023-03-17 RX ADMIN — Medication 5 MG: at 20:29

## 2023-03-17 RX ADMIN — MYCOPHENOLIC ACID 720 MG: 360 TABLET, DELAYED RELEASE ORAL at 20:29

## 2023-03-17 RX ADMIN — OXYCODONE HYDROCHLORIDE 5 MG: 5 TABLET ORAL at 21:54

## 2023-03-17 RX ADMIN — OXYCODONE HYDROCHLORIDE 5 MG: 5 TABLET ORAL at 10:05

## 2023-03-17 RX ADMIN — VALGANCICLOVIR 450 MG: 450 TABLET, FILM COATED ORAL at 20:36

## 2023-03-17 RX ADMIN — HEPARIN SODIUM 5000 UNITS: 5000 INJECTION, SOLUTION INTRAVENOUS; SUBCUTANEOUS at 02:59

## 2023-03-17 RX ADMIN — METOPROLOL TARTRATE 25 MG: 25 TABLET, FILM COATED ORAL at 07:53

## 2023-03-17 RX ADMIN — PREDNISONE 5 MG: 5 TABLET ORAL at 07:52

## 2023-03-17 RX ADMIN — HEPARIN SODIUM 2000 UNITS: 1000 INJECTION INTRAVENOUS; SUBCUTANEOUS at 15:49

## 2023-03-17 RX ADMIN — URSODIOL 300 MG: 300 CAPSULE ORAL at 07:52

## 2023-03-17 RX ADMIN — SULFAMETHOXAZOLE AND TRIMETHOPRIM 1 TABLET: 400; 80 TABLET ORAL at 20:36

## 2023-03-17 RX ADMIN — TACROLIMUS 2.5 MG: 1 CAPSULE ORAL at 20:29

## 2023-03-17 RX ADMIN — METHOCARBAMOL 500 MG: 500 TABLET ORAL at 23:47

## 2023-03-17 RX ADMIN — ACETAMINOPHEN 650 MG: 325 TABLET ORAL at 00:31

## 2023-03-17 RX ADMIN — Medication 25 MCG: at 07:53

## 2023-03-17 RX ADMIN — FLUOXETINE HYDROCHLORIDE 60 MG: 40 CAPSULE ORAL at 20:38

## 2023-03-17 RX ADMIN — SODIUM CHLORIDE 300 ML: 9 INJECTION, SOLUTION INTRAVENOUS at 15:41

## 2023-03-17 ASSESSMENT — ACTIVITIES OF DAILY LIVING (ADL)
ADLS_ACUITY_SCORE: 35
ADLS_ACUITY_SCORE: 38
ADLS_ACUITY_SCORE: 38
ADLS_ACUITY_SCORE: 35

## 2023-03-17 NOTE — PROGRESS NOTES
HEMODIALYSIS TREATMENT NOTE    Date: 3/17/2023  Time: 5:49 PM    Data:  Pre Wt:   70.1 kg (estimated)  Desired Wt: 67.6  kg (estimated)  Post Wt:  67.6  kg (estimated)  Weight change:  2.5 kg  Ultrafiltration - Post Run Net Total Removed (mL): 2500 mL  Vascular Access Status: patent  Dialyzer Rinse: Light, Streaked  Total Blood Volume Processed:67.68 Liters  Total Dialysis (Treatment) Time: 4 Hours    Lab:   No    Interventions/Assessment:  Received on bed with NGT, A/Ox4  Dependent edema noted  CVC is clean,intact and dry  Pt. dialyzed for 4 hrs via CVC, UF set for 2.5 L  BFR:300, BFR:600, Dialysate bath K:2, CA:2.5  PRN albumin 25% 50-ml given per order  BG check at 1745, result is 228mg/dl, inform to PCN  Insulin not given cause the pt.doesnt want to eat food.   Handoff reports given to CONI Victoria     Plan:    Per renal team

## 2023-03-17 NOTE — PROGRESS NOTES
[facility]   Transplant Nephrology Progress Note  Date of Admission:  2/11/2023  Today's Date: 03/17/2023    Recommendations:  - Dialysis today with goal wt 69.0 kg as tolerated, continue MWF schedule  - Discharge planning in process.    Assessment & Plan   # ASHISH: Remains oliguric    - ASHISH likely secondary to hemorrhagic shock and hemodynamic changes following liver transplant, as well as mild HRS prior to transplant.  - HD Info  Access: Right IJ Tunneled Catheter (placed 3/12), Days: MWF, Length: 4.0 hrs, EDW: 69.0 kg, Heparin: No, MARISEL: No, IV Iron: No, Vit D analog: No  - Has a sherri reserved at [facility] dialysis in [place] MWF at 1:45   - Baseline Creatinine: ~ 0.6-0.8   - Proteinuria: Normal (<0.2 grams)    # Liver Tx: ESLD secondary to alcoholic cirrhosis, s/p OLT 2/28/23.  Transaminases and total bilirubin now normal.  Followed by Transplant Surgery.    # Immunosuppression: Tacrolimus immediate release (goal 6-8), Mycophenolic acid (dose 360 mg every 12 hours) and Prednisone (dose 5 mg daily)   - Induction with Recent Transplant:  Per Liver Tx protocol.   - Changes: Not at this time; Management per Transplant Surgery.    # Infection Prophylaxis:   - PJP: Sulfa/TMP (Bactrim)  - CMV: Valganciclovir (Valcyte); CMV IgG Ab recipient and donor negative    # Blood Pressure: Controlled, but low at times;  Goal BP: < 140/90 (Hospitalization goal)   - Volume status: Mildly hypervolemic  EDW ~ 69.0 kg   - Changes: No     # Diabetes: Controlled (HbA1c <7%) Last HbA1c: 6.1%   - Management as per primary team.    # Anemia in Chronic Disease: Hgb: Stable, low      MARISEL: No   - Iron studies: Low iron saturation, but high ferritin (2/22/23)    # Mineral Bone Disorder:   - Vitamin D; level: Low (12/20/22)        On supplement: Yes, cholecalciferol 25 mcg PO daily.  - Calcium; level: Normal        On supplement: No  - Phosphorus; level: High        On binder: No    # Electrolytes:   -  Potassium; level: High normal        On supplement: No, modulate with HD  - Magnesium; level: Normal        On supplement: Yes, magnesium oxide 400 mg PO QD  - Bicarbonate; level: Normal        On supplement: No   -Hyponatremia. Modulate with HD    # Malnutrition: Patient on tube feedings.    # Diarrhea (resolved): Some loose stools.  Now started on fiber.  C. diff negative 3/5.    # EBV IgG Ab Discordance (D+/R-): Will do surveillance EBV PCR qmonth until 12 months post transplant, then q3 months until 2 years post transplant.    # Aspergers/Depression/Hallucinations: Patient appears to be having less hallucinations per his mother.  Seen by Psychiatry and started on olanzapine, as well as continuing on fluoxetine.    # Transplant History:  Etiology of Organ Failure: Alcoholic cirrhosis  Tx: Liver Tx  Transplant: 2/28/2023 (Liver)  Donor Type:  Donor Class:   Crossmatch at time of Tx: negative  DSA at time of Tx: No   Significant changes in immunosuppression: Lower CNI goal due to ASHISH  Significant transplant-related complications: ASHISH    Recommendations were communicated to the primary team verbally.    Patient dicussed with Dr. Karen Machado APRN CNP   Pager: [phone]      Physician Attestation     I saw and evaluated Dillon Salter as part of a shared APRN/PA visit.     I personally reviewed the vital signs, medications, labs and imaging.    I personally performed the substantive portion of the medical decision making for this visit - please see the CHRISTA's documentation for full details.    Key management decisions made by me and carried out under my direction: HD today on MWF schedule. Continue to plan towards discharge with [facility] as patient will be discharging home. Decrease free water intake.     I personally performed the substantive portion of the history for this visit - please see the CHRISTA's documentation for full details.  Key additional history findings made by me: Pt feels well,  less diarrhea, cleared for home, working on dialysis unit placement.     Anderson Jones MD  Date of Service (when I saw the patient): 23        Interval History   Mr. Salter's creatinine is 3.18 ( 0546); Trend up.  Pt enthusiastic about rehab and returning home.  Low urine output. 100 ml yesterday.  Other significant labs/tests/vitals: VSS.  No acute events overnight.  No chest pain or shortness of breath.  Continues to have leg swelling, but improving with dialysis.  No nausea and vomiting.  Bowel movements are formed.  No fever, sweats or chills.      Review of Systems   4 point ROS was obtained and negative except as noted in the Interval History.    MEDICATIONS:    aspirin  325 mg Per Feeding Tube Daily     FLUoxetine  60 mg Oral At Bedtime     heparin ANTICOAGULANT  5,000 Units Subcutaneous Q12H     insulin aspart   Subcutaneous TID w/meals     insulin glargine  15 Units Subcutaneous BID     lidocaine  2 patch Transdermal Q24H     lidocaine   Transdermal Q8H GARETH     magnesium oxide  400 mg Oral Q24H     melatonin  5 mg Oral QPM     metoprolol tartrate  25 mg Oral BID     multivitamin RENAL  1 tablet Oral Daily     mycophenolic acid  720 mg Oral BID IS     nystatin  500,000 Units Swish & Swallow 4x Daily     pantoprazole  40 mg Oral BID     predniSONE  5 mg Oral Daily     sodium chloride (PF)  3 mL Intravenous Q8H     sodium chloride (PF)  9 mL Intracatheter During Dialysis/CRRT (from stock)     sodium chloride (PF)  9 mL Intracatheter During Dialysis/CRRT (from stock)     sulfamethoxazole-trimethoprim  1 tablet Oral or Feeding Tube Once per day on      tacrolimus  2.5 mg Oral BID IS     ursodiol  300 mg Oral BID     valGANciclovir  450 mg Oral Once per day on      cholecalciferol  25 mcg Oral or Feeding Tube Daily       dextrose Stopped (23 7226)     dextrose 1,000 mL (03/10/23 0135)       Physical Exam   Temp  Av.6  F (36.4  C)  Min: 92.8  F (33.8  C)  Max: 99.5  F (37.5   C)  Arterial Line BP  Min: 78/56  Max: 173/78  Arterial Line MAP (mmHg)  Av.4 mmHg  Min: 56 mmHg  Max: 190 mmHg      Pulse  Av.4  Min: 62  Max: 112 Resp  Avg: 15.7  Min: 8  Max: 34  FiO2 (%)  Av.5 %  Min: 30 %  Max: 100 %  SpO2  Av.6 %  Min: 84 %  Max: 100 %    CVP (mmHg): 8 mmHgBP 114/80 (BP Location: Left arm, Cuff Size: Adult Small)   Pulse 71   Temp 98.4  F (36.9  C) (Oral)   Resp 15   Wt 70.1 kg (154 lb 8.7 oz)   SpO2 99%   BMI 25.72 kg/m     Date 23 07 - 23 0659   Shift 7722-1127 1693-1444 9754-7246 24 Hour Total   INTAKE   I.V. 156   156   NG/GT 80   80   Enteral 160   160   Shift Total(mL/kg) 396(5.04)   396(5.04)   OUTPUT   Shift Total(mL/kg)       Weight (kg) 78.5 78.5 78.5 78.5      Admit Weight: 65.9 kg (145 lb 4.5 oz)     GENERAL APPEARANCE: Pt sleeping at time of visit.  Awakens to voice.  HENT: mouth without ulcers or lesions, NJ in place  RESP: lungs clear to auscultation - no rales, rhonchi or wheezes  CV: regular rhythm, normal rate, no rub, no murmur  EDEMA: 1+ LE and dependent edema bilaterally  ABDOMEN: soft, nondistended, mild TTP, bowel sounds normal  MS: extremities normal - no gross deformities noted, no evidence of inflammation in joints, no muscle tenderness  SKIN: no rash  DIALYSIS ACCESS:  Right IJ tunneled catheter     Data   All labs reviewed by me.  CMP  Recent Labs   Lab 23  1222 23  0759 23  0546 23  0235 23  0903 23  0635 03/15/23  1003 03/15/23  0635 23  1249 23  0722   NA  --   --  125*  --   --  128*  --  128*  --  126*   POTASSIUM  --   --  5.0  --   --  4.3  --  4.0  --  4.0   CHLORIDE  --   --  90*  --   --  93*  --  90*  --  92*   CO2  --   --  25  --   --  25  --  27  --  25   ANIONGAP  --   --  10  --   --  10  --  11  --  9   * 225* 217* 156*   < > 242*   < > 125*   < > 121*   BUN  --   --  81.4*  --   --  54.0*  --  58.0*  --  34.6*   CR  --   --  3.18*  --   --  2.53*  --   2.89*  --  2.04*   GFRESTIMATED  --   --  26*  --   --  34*  --  29*  --  44*   SARTHAK  --   --  9.4  --   --  9.4  --  9.7  --  9.2   MAG  --   --  2.1  --   --  1.9  --  2.0  --  1.7   PHOS  --   --  4.9*  --   --  3.5  --  3.5  --  2.9   PROTTOTAL  --   --  5.3*  --   --  5.8*  --  5.9*  --  5.5*   ALBUMIN  --   --  3.0*  --   --  3.3*  --  3.4*  --  3.1*   BILITOTAL  --   --  1.0  --   --  1.1  --  1.2  --  1.3*   ALKPHOS  --   --  281*  --   --  311*  --  326*  --  312*   AST  --   --  16  --   --  24  --  20  --  22   ALT  --   --  14  --   --  15  --  18  --  17    < > = values in this interval not displayed.     CBC  Recent Labs   Lab 03/17/23  0546 03/16/23  0635 03/15/23  0635 03/14/23  0722   HGB 7.4* 8.0* 8.5* 7.6*   WBC 10.4 10.4 11.9* 9.2   RBC 2.31* 2.51* 2.66* 2.37*   HCT 23.6* 26.2* 27.4* 24.8*   * 104* 103* 105*   MCH 32.0 31.9 32.0 32.1   MCHC 31.4* 30.5* 31.0* 30.6*   RDW 19.8* 20.1* 19.9* 20.5*    254 234 213     INR  No lab results found in last 7 days.  ABG  No lab results found in last 7 days.   Urine Studies  Recent Labs   Lab Test 03/12/23  1134 02/27/23  1616 02/24/23  1818 02/22/23  1421   COLOR Dark Yellow* Yellow Light Yellow Yellow   APPEARANCE Cloudy* Clear Clear Clear   URINEGLC 30* Negative Negative Negative   URINEBILI Small* Negative Negative Negative   URINEKETONE Negative Negative Negative Negative   SG 1.027 1.012 1.009 1.010   UBLD Moderate* Negative Negative Negative   URINEPH 5.0 5.5 5.0 5.0   PROTEIN 100* 30* Negative Negative   NITRITE Negative Negative Negative Negative   LEUKEST Small* Negative Negative Negative   RBCU 20* <1 <1 1   WBCU 112* 2 1 2     No lab results found.  PTH  No lab results found.  Iron Studies  Recent Labs   Lab Test 02/22/23  0610 02/20/23  0730 02/16/23  0543 12/20/22  0703 10/06/22  0958 04/07/22  0624   IRON 26*  --   --  249*  --  85   *  --   --   --   --   --    IRONSAT 19  --   --   --   --   --    ASCENCION 1,465* 1,335* 1,725*  2,207* 2,042* 423*       IMAGING:  All imaging studies reviewed by me.

## 2023-03-17 NOTE — TELEPHONE ENCOUNTER
Spoke to 7A RN, plan for patient to discharge to home over the weekend. Discharge appointments placed with transplant surgeon and hepatologists.

## 2023-03-17 NOTE — PLAN OF CARE
Neuro: A&Ox4.   Cardiac: Afebrile, VSS.    Respiratory: RA, lung sounds clear, denies SOB  GI/: Oliguric, pt on HD. No BM this shift.  Diet/appetite: Tolerating regular diet . Denies nausea. BG checks ACHS  Activity: Up with 1 assist    Pain: Denies   Skin: No new deficits noted.  Lines: L PIV, SL. R DL internal jugular, HD line. NG, clamped.  Drains: none  Replacements: none    Plan: Continue with current POC. Report changes to primary team.

## 2023-03-17 NOTE — PROGRESS NOTES
CLINICAL NUTRITION SERVICES - DISCHARGE NOTE    Patient s discharge needs assessed and discharge planning has been conducted with the multidisciplinary transplant care team including physicians, pharmacy, social work and transplant coordinator.    Follow up/Monitoring:  Once discharged, place outpatient nutrition consult via the transplant team if nutrition concerns arise.    Karlee Sim RD, LD  Pager: [phone]

## 2023-03-17 NOTE — PLAN OF CARE
1900 - 0730    /66 (BP Location: Left arm)   Pulse 68   Temp 97.3  F (36.3  C) (Oral)   Resp 17   Wt 70.1 kg (154 lb 8.7 oz)   SpO2 98%   BMI 25.72 kg/m      VS: Stable on RA  BG: Q4H 215, 156, 217  Labs: AM labs pending  Pain/Nausea/PRN: PRN oxy, tylenol, robaxin, zyprexa, and zofran given overnight  Diet: Regular, nadja counts, and cycled TF @ 50 mL/hr - started an hour later so pt could eat dinner  LDA: PIV - SL, CVC, NJ  GI: last BM 3/16  : oliguric on HD  Skin: incision stapled; scabs dry, open to air  Mobility: SBA with walker  Plan: HD today, continue with current POC, update team with any changes - med card and lab book up to date

## 2023-03-17 NOTE — TELEPHONE ENCOUNTER
"Spoke to 7A RN, patient will be discharged to home.     Spoke with caregiver.  Introduced myself and explained the role of the post liver transplant coordinator and support team.      Briefly discussed the following topics:  1.) immunosuppression and the importance of taking regularly as directed.  2.) pertinent labs and their interpretation  3.) rejection signs / symptoms and treatment and 4.) Importance of long term follow-up with the transplant center and primary care physician 5.) presence of bililary stent placed at time of surgery,  need for patient to notify me if stent passes or need for removal if not seen 2-3 months post transplant  Has lab book and understands responsibility of getting and recording results. Education in process.  (See inpatient education teaching flowsheet). Is aware that they will have clinic visit 1 week and then again weekly x 3 after discharge from hospital or transitional care / rehab.  Is aware that he needs a follow-up appointment with pre transplant primary care within 1-2 weeks of returning home. Is aware that he will need follow-up with the transplant team for the rest of his life.  Initial follow-up will be with the transplant surgeon, then move to pre-transplant hepatologist.  Patient is aware that he will need lab testing, initially every Monday and Thursday to monitor liver function on a regular basis for the rest of his life.     Emailed patient handouts entitled \"Things to Remember\" and \"Your Lab Results\" as well as a copy of contact numbers for myself, social work, transplant LPN and after hours/ weekend / holiday phone numbers.  Denies further questions at this time.      Organ specific education completed, and discharge planning has been conducted with multidisciplinary transplant care team including physicians, pharmacy, nutrition, and social work.     " What Is The Reason For Today's Visit?: Full Body Skin Examination

## 2023-03-17 NOTE — PROGRESS NOTES
Immunosuppression Management Note:    Dillon Salter is a 27 year old male who is seen today  for immunosuppression management     I, John Rondon MD, I have examined the patient with our CHRISTA/Fellow as part of a shared visit.   I participated in the rounds,  discussed and agree with the note and findings and  reviewed today's vital signs, medications, labs and imaging as noted in this note.  I  reviewed the  immunosuppression medications.  I personally provided a substantive portion of the care of this patient. I personally performed the immunosuppressive management of this patient, reviewed the overall  immunosuppression including drug levels, allograft function and provided the recommendations to adjust the dose to provide optimal levels to prevent rejection of the allograft and prevent toxicity to the organs. This was complex care due to the fresh allograft.   Time spent: evaluating patient, examining patient, discussion of plan, counseling and documentation: >35 min   I spoke to the patient/family and explained below clinical details and answered all the questions    Transplant Surgery  Inpatient Daily Progress Note  2023    Assessment & Plan: Dillon Salter is a 27 year old male with a past medical history of Asperger's, DM2, and alcoholic cirrhosis c/b esophageal varices and hepatic encephalopathy. He is now s/p  donor liver transplant without stent on 23 with Dr. Sterling. Return to OR for washout and repair of arterial bleeding on 3/1/23.    s/p DDLT 23: POD #17.   Course: Transaminases trending down appropriately after transplant until ~ POD #9 when IS subtherapeutic. Liver US with patent flow. MRCP 3/10 with abrubt caliber change in CBD. Narrowing of the common hepatic duct and central intrahepatic ducts.    Today: Total bilirubin, AST and ALT wnl. Alkaline phosphatase slowly downtrending, down to 281.   - ASA 325mg daily  - Ursodiol 300mg BID    Imaging:  -3/1 US:  resolution of hematoma, patent vessels  -3/3 US: Patent, resolution of perihepatic hematoma with new perihepatic hematoma up to 9 cm.  -3/7 US with good flow.   -3/10 MRCP: Abrupt caliber change in the common bile duct which may be related to anastomosis. Mild periportal edema. Multiple perihepatic fluid collections.    Immunosuppression management:  Induction: Steroid taper and basiliximab x2 per renal sparing protocol.  Maintenance:   - mg BID --> reduced to 500mg BID in setting of diarrhea --> changed to Myfortic 360 mg BID (3/8/2023) --> increased to 720mg BID (3/17/2023)  -Tacrolimus: decreased to 2.5mg BID; goal level changed to 6-8 for renal sparing (on 3/17/2023)  -Prednisone 5mg daily    Neuro/Psych:  Acute post op pain in the setting of chronic musculoskeletal + neuropathic pain pain: On oxycodone PTA for neuropathy since ~ 8/2022. Previously had tried gabapentin without benefit.   - Reduced oxycodone to 2.5 to 5mg to q12h prn on 3/16/2023 --> reduce to 2.5mg q12h PRN on 3/17 and then off on 3/18/2023.   - Encourage tylenol use prior to opioid use  - Robaxin 500mg q6h PRN  - Minimize narcotic use due to sedation and falls  * Pain consult placed for non-opioid management of chronic + neuropathic pain; no indication for chronic opioid use - recommended topical agents and taper of oxycodone.   MDD, Anxiety, Autism spectrum: PTA fluoxetine. Zyprexa 2.5mg BID PRN  Acute delirium, RESOLVED: Post operatively had ICU delirium with hallucinations. Zyprexa as above    Hematology:   Leukocytosis: Noted to have a leukocytosis to 11.9 on 3/15 AM, but afebrile. Improved to 10.4 on 3/16 and stable on 3/17. Will closely monitor for s/sx of infection.   Anemia of chronic disease/Acute blood loss: Hgb stable ~7-9 without overt s/sx of blood loss.   Thrombocytopenia: PLT improving,~200    Cardiorespiratory:   Post op ventilatory support/hypercapnia/acute pulmonary edema: Extubated 3/3. Lethargic and hypercapnic 3/4  AM, improved with BiPAP. Stable on ambient air.   Hemorrhagic shock: Secondary to bleeding. Resolved.     GI/Nutrition:   Severe malnutrition in the context of acute illness: Nutrition consulted. FT in place, change EN Osmolite 1.5 @ 50/hr x12 +2 pkts Prosource.  * Plan to remove feeding tube today given meeting calorie goals.   Diarrhea: May be related to tube feeding vs cellcept. Cellcept changed to Myfortic. Changed EN 3/13. Cdiff neg, On Fiber TID. Improved.    Endocrine:   DM2; Steroid induced hyperglycemia: initially managed with insulin gtt --> transitioned to sliding scale insulin. Lantus 15 units BID + sliding scale insulin PRN.       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 CRRT started 3/1/23 -- > transitioned to iHD 3/4. Tunneled line placed by IR on 3/10.   * HD per nephrology, plan for MWF HD  Hypervolemia: Volume removal with HD.    : Ongoing low urine output; lowered tacrolimus goal on 3/17/23. Monitor for renal recovery    Prophylaxis: DVT (mechanical), fall, GI (PPI), fungal (Initially received fluconazole --> Micafungin d/t CRRT/re-operation --> nystatin), viral (Valcyte), pneumocystis (Bactrim -restarted 3/6), jalen-op (Zosyn)    Disposition: 7A    CHRISTA/Fellow/Resident Provider: Marilin Jones MD    Faculty: John Rondon M.D.    __________________________________________________________________  Transplant History:    2/28/2023 (Liver), Postoperative day: 17     Interval History:  Overnight, no acute events. Afebrile.    Pain has been managed with methocarbamol, oxycodone and tylenol. Zyprexa given overnight for anxiety. Zofran given overnight for mild nausea. Tolerating cycled tube feeds with > 2000 kcal in the last 24 hours. Last BM 3/16/2023. Walking with a walker - stand by assist.     ROS:   A 10-point review of systems was negative except as noted above.    Curent Meds:   sodium  chloride 0.9%  300 mL Hemodialysis Machine Once    albumin human  250 mL Intravenous Once in dialysis/CRRT    aspirin  325 mg Per Feeding Tube Daily    FLUoxetine  60 mg Oral At Bedtime    sodium chloride (PF) 0.9%  10 mL Intracatheter Once in dialysis/CRRT    Followed by    heparin  1.3-2.6 mL Intracatheter Once in dialysis/CRRT    sodium chloride (PF) 0.9%  10 mL Intracatheter Once in dialysis/CRRT    Followed by    heparin  1.3-2.6 mL Intracatheter Once in dialysis/CRRT    heparin ANTICOAGULANT  5,000 Units Subcutaneous Q12H    insulin aspart   Subcutaneous TID w/meals    insulin glargine  15 Units Subcutaneous BID    lidocaine  2 patch Transdermal Q24H    lidocaine   Transdermal Q8H GARETH    magnesium oxide  400 mg Oral Q24H    melatonin  5 mg Oral QPM    metoprolol tartrate  25 mg Oral BID    multivitamin RENAL  1 tablet Oral Daily    mycophenolic acid  720 mg Oral BID IS    - MEDICATION INSTRUCTIONS -   Does not apply Once    nystatin  500,000 Units Swish & Swallow 4x Daily    pantoprazole  40 mg Per Feeding Tube BID    predniSONE  5 mg Oral Daily    sodium chloride (PF)  3 mL Intravenous Q8H    sodium chloride (PF)  9 mL Intracatheter During Dialysis/CRRT (from stock)    sodium chloride (PF)  9 mL Intracatheter During Dialysis/CRRT (from stock)    sulfamethoxazole-trimethoprim  1 tablet Oral or Feeding Tube Once per day on Mon Wed Fri    tacrolimus  2.5 mg Oral BID IS    ursodiol  300 mg Oral BID    valGANciclovir  450 mg Oral Once per day on Mon Fri    cholecalciferol  25 mcg Oral or Feeding Tube Daily       Physical Exam:     Admit Weight: 65.9 kg (145 lb 4.5 oz)    Current Vitals:   /78 (BP Location: Left arm)   Pulse 78   Temp 99.4  F (37.4  C) (Oral)   Resp 16   Wt 70.1 kg (154 lb 8.7 oz)   SpO2 99%   BMI 25.72 kg/m      Vital sign ranges:    Temp:  [97.3  F (36.3  C)-99.4  F (37.4  C)] 99.4  F (37.4  C)  Pulse:  [65-79] 78  Resp:  [16-17] 16  BP: (120-128)/(66-80) 122/78  SpO2:  [97 %-100 %]  99 %    General Appearance: No acute distress  Nose: NJ tube in place  Skin: warm, dry  Heart: RRR  Lungs: Breathing comfortably on ambient air  Abdomen: abdomen soft, non-distended. Incision c/d/i. Resolving eschar above incision site without overlying erythema and purulence.  Extremities: edema: 1+ [place] edema bilaterally  Skin: No jaundice.   - RIJ dialysis tunneled line  Neurologic: A&Ox 3    Data:   CMP  Recent Labs   Lab 03/17/23  1222 03/17/23  0759 03/17/23  0546 03/16/23  0903 03/16/23  0635   NA  --   --  125*  --  128*   POTASSIUM  --   --  5.0  --  4.3   CHLORIDE  --   --  90*  --  93*   CO2  --   --  25  --  25   * 225* 217*   < > 242*   BUN  --   --  81.4*  --  54.0*   CR  --   --  3.18*  --  2.53*   GFRESTIMATED  --   --  26*  --  34*   SARTHAK  --   --  9.4  --  9.4   MAG  --   --  2.1  --  1.9   PHOS  --   --  4.9*  --  3.5   ALBUMIN  --   --  3.0*  --  3.3*   BILITOTAL  --   --  1.0  --  1.1   ALKPHOS  --   --  281*  --  311*   AST  --   --  16  --  24   ALT  --   --  14  --  15    < > = values in this interval not displayed.     CBC  Recent Labs   Lab 03/17/23  0546 03/16/23  0635 03/12/23  0721 03/12/23  0006   HGB 7.4* 8.0*   < >  --    WBC 10.4 10.4   < >  --     254   < >  --    A1C  --   --   --  5.2    < > = values in this interval not displayed.

## 2023-03-17 NOTE — PROGRESS NOTES
3/17: Rj refarral for OP HD at [facility] started by Valerie FARRAR- need to fax to [facility] [phone] a chest xray or recent quantiferon gold (most recent is 11/2022)- CHARAN faxed most recent chest xray, waiting to hear if it is accepted.   Rj Ph # to ask if they accept the xray or still need a TB test, and if pt is approved: [phone].

## 2023-03-17 NOTE — TELEPHONE ENCOUNTER
A pharmacist spent 45 minutes providing medication teaching with Dillon Salter and Leticia Salter (mom) for discharge with a focus on new medications/dose changes.  The discharge medication list was reviewed with the patient/family and the following points were discussed, as applicable: Name, description, purpose, dose/strength, duration of medications, special storage requirements, common side effects, food/medications to avoid, action to be taken if dose is missed and how to obtain refills.  The family member (Leticia, mom) will be responsible for managing medications. Additionally, the following transplant related education was covered: Purpose of medication card, Medication videos, Timing of medications and day of lab draw considerations , Prescription Insurance  and Discharge process for receiving meds   Patient will  transplant supplies including 7 day pill organizer amd thermometer at the discharge pharmacy along with medications. (He already has a BP monitor.) Patient chooses to receive medications from  specialty pharmacy.   Clinical Pharmacy Consult:                                                      Transplant Specific:   Date of Transplant: 2/28/2023  Type of Transplant: liver  First Transplant: yes  History of rejection: no    Immunosuppression Regimen   TAC 2.5mg qAM & 2.5mg qPM, Prednisone 5mg qAM, and Myforitic 720mg qAM & 720mg qPM  Patient specific goal: 6-8 for renal sparing  Most recent level: 10, date 3/17/2023  Immunosuppressant Levels:  Supratherapeutic  Pt adherent to lab draws: yes  Scr:   Lab Results   Component Value Date    CR 3.18 03/17/2023     Side effects: Tremors and edema- both are pre-existing and not any worse than previously    Prophylactic Medications  Antibacterial:  Bactrim 400-80mg daily on Mon, Wed, and Fri  Scheduled Discontinue Date: 6 months    Antifungal: Not needed thus far  Scheduled Discontinue Date: N/A    Antiviral: CrCl 25 to 39 mL/minute: Valcyte 450 mg  every other day is recommended dose. Currently dosed at 450mg every Mon and Fri.   Scheduled Discontinue Date: 3 months    Acid Reducer: Protonix (pantoprazole)  Scheduled Reviewed Date: possibly long term since was on prior to admission    Thrombosis Prevention: Aspirin 325 mg PO daily  Scheduled Discontinue Date:     Blood Pressure Management  Frequency of home Blood Pressure checks: twice daily  Most recent home BP: 122/78  Patient Blood pressure goal: <140/90  Patient blood pressure at goal:  yes  Hospitalizations/ER visits since last assessment: 0      Med rec/DUR performed: yes  Med Rec Discrepancies: no    Reminders:    1. Bring to first clinic appt: med box, med card, bp monitor, all medications being taken, and lab book.  2.   MTM pharmacist visit on first clinic appt and if ok, again in 3 to 4 months during follow up appt.  3.   Avoid Grapefruit and Grapefruit juice.   4.   Avoid herbal supplements. If wish to take other medications or supplements, call your coordinator.   5.   Keep lab appts.      7.   Make sure you are protecting your skin by wearing long sleeves and applying sunscreen to exposed skin, for any significant time in the sun.     Transplant Coordinator is Vicky Castro.      Marilin Pascual [facility]

## 2023-03-17 NOTE — PROGRESS NOTES
Care Management Follow Up    Length of Stay (days): 34    Expected Discharge Date: 03/19/2023     Concerns to be Addressed: discharge planning     Patient plan of care discussed at interdisciplinary rounds: Yes    Anticipated Discharge Disposition: Home. Resume skilled home care thru [facility].      Anticipated Discharge Services:  New dialysis  Anticipated Discharge DME: None    Referrals Placed by CM/SW:  Dialysis  Private pay costs discussed: Not applicable    Additional Information:  In 7A Discharge Rounds it was reported tube feeding being discontinued; dialysis today. Anticipate he will be ready for discharge his weekend.     Referral has been made to [facility] for outpt dialysis at their [place] location. CHARAN Padilla, faxed most recent CXR. Waiting to hear if they have accepted.    This afternoon Julieth Leblanc NP, inquired about outpt dialysis. I called [facility] at [phone], ext. [phone], [phone]; had to leave a voice message. NP entered orders for a CXR.     Plan:  Need to confirm if [facility] has accepted for outpt dialysis. Will need to add info the the AVS once confirmed. Message left for Weekend RN[facility] to follow-up.   --Resume skilled home care thru [facility].         Goldie Vega, RN Care Coordinator  [facility]  [facility]  On 3- RNCC coverage for [address], call [phone].

## 2023-03-17 NOTE — CONSULTS
Discharge Pharmacy Test Claim    Mycophenolic is not covered by patient's [employer] prepaid medical assistance plan. Pharmacy liaison submitted a prior authorization request 3/17. Pending insurance determination.    Test Claim Copay   mycophenolic PA pending       Evy Hurst  [facility] Pharmacy Liaison  Ph: [phone] Pager: [phone]   Securely message with the Vocera Web Console (learn more here)

## 2023-03-17 NOTE — PROGRESS NOTES
Calorie Count  Intake recorded for: 3/16  Total Kcals: 2036 Total Protein: 85g  Kcals from Hospital Food: 926  Protein: 59g  Kcals from Outside Food (average):1110 Protein: 26g  # Meals Ordered from Kitchen: 2 meals + food from outside the hospital   # Meals Recorded: 3 meals (First - 100%  w/ barbeque sauce, 1.25 skim milks)       (Second - 100% 6 inch turkey & cheese sub from [employer], [employer], chocolate cookie - from outside the hospital)       (Third - 100% large strawberry banana smoothie from coffee shop)  # Supplements Recorded: 100% 2 Ensure Plus High Protein

## 2023-03-17 NOTE — PROGRESS NOTES
/76 (BP Location: Left arm)   Pulse 74   Temp 98.1  F (36.7  C) (Axillary)   Resp 16   Wt 70.1 kg (154 lb 8.7 oz)   SpO2 96%   BMI 25.72 kg/m      Shift: 9449-2780  Isolation Status: NA  VSS on RA, afebrile  Neuro: Aox4  Behaviors: pleasant & cooperative  B  Respiratory: SOB with exertion, MD aware  Cardiac: WDL  Pain/Nausea: Pain managed with oxycodone  PRN: Oxycodone at 1000  Diet: Regular, poor appetite, TF at NOC  IV Access: PIV  Lines/Drains: Dialysis PCAD  GI/: oliguric, on HD BM 3/17/2023  Skin: No new  concerns  Mobility: SBA w/walker  Events/Education: Medication  & discharge eduaction  Plan: Discharge this weekend.

## 2023-03-18 LAB
ABO/RH(D): NORMAL
ALBUMIN SERPL BCG-MCNC: 3.4 G/DL (ref 3.5–5.2)
ALP SERPL-CCNC: 259 U/L (ref 40–129)
ALT SERPL W P-5'-P-CCNC: 13 U/L (ref 10–50)
ANION GAP SERPL CALCULATED.3IONS-SCNC: 10 MMOL/L (ref 7–15)
ANTIBODY SCREEN: NEGATIVE
AST SERPL W P-5'-P-CCNC: 18 U/L (ref 10–50)
BILIRUB SERPL-MCNC: 1 MG/DL
BLD PROD TYP BPU: NORMAL
BLOOD COMPONENT TYPE: NORMAL
BUN SERPL-MCNC: 39.6 MG/DL (ref 6–20)
CALCIUM SERPL-MCNC: 9.2 MG/DL (ref 8.6–10)
CHLORIDE SERPL-SCNC: 95 MMOL/L (ref 98–107)
CODING SYSTEM: NORMAL
CREAT SERPL-MCNC: 1.96 MG/DL (ref 0.67–1.17)
CROSSMATCH: NORMAL
DEPRECATED HCO3 PLAS-SCNC: 27 MMOL/L (ref 22–29)
ERYTHROCYTE [DISTWIDTH] IN BLOOD BY AUTOMATED COUNT: 19.9 % (ref 10–15)
GFR SERPL CREATININE-BSD FRML MDRD: 47 ML/MIN/1.73M2
GLUCOSE BLDC GLUCOMTR-MCNC: 104 MG/DL (ref 70–99)
GLUCOSE BLDC GLUCOMTR-MCNC: 110 MG/DL (ref 70–99)
GLUCOSE BLDC GLUCOMTR-MCNC: 122 MG/DL (ref 70–99)
GLUCOSE BLDC GLUCOMTR-MCNC: 127 MG/DL (ref 70–99)
GLUCOSE BLDC GLUCOMTR-MCNC: 144 MG/DL (ref 70–99)
GLUCOSE BLDC GLUCOMTR-MCNC: 181 MG/DL (ref 70–99)
GLUCOSE BLDC GLUCOMTR-MCNC: 58 MG/DL (ref 70–99)
GLUCOSE BLDC GLUCOMTR-MCNC: 67 MG/DL (ref 70–99)
GLUCOSE BLDC GLUCOMTR-MCNC: 85 MG/DL (ref 70–99)
GLUCOSE SERPL-MCNC: 68 MG/DL (ref 70–99)
HCT VFR BLD AUTO: 23.3 % (ref 40–53)
HGB BLD-MCNC: 7.2 G/DL (ref 13.3–17.7)
ISSUE DATE AND TIME: NORMAL
MAGNESIUM SERPL-MCNC: 1.8 MG/DL (ref 1.7–2.3)
MCH RBC QN AUTO: 32.1 PG (ref 26.5–33)
MCHC RBC AUTO-ENTMCNC: 30.9 G/DL (ref 31.5–36.5)
MCV RBC AUTO: 104 FL (ref 78–100)
PHOSPHATE SERPL-MCNC: 4 MG/DL (ref 2.5–4.5)
PLATELET # BLD AUTO: 274 10E3/UL (ref 150–450)
POTASSIUM SERPL-SCNC: 3.9 MMOL/L (ref 3.4–5.3)
PROT SERPL-MCNC: 5.7 G/DL (ref 6.4–8.3)
RBC # BLD AUTO: 2.24 10E6/UL (ref 4.4–5.9)
SODIUM SERPL-SCNC: 132 MMOL/L (ref 136–145)
SPECIMEN EXPIRATION DATE: NORMAL
UNIT ABO/RH: NORMAL
UNIT NUMBER: NORMAL
UNIT STATUS: NORMAL
UNIT TYPE ISBT: 600
WBC # BLD AUTO: 7.1 10E3/UL (ref 4–11)

## 2023-03-18 PROCEDURE — 250N000011 HC RX IP 250 OP 636: Performed by: PHYSICIAN ASSISTANT

## 2023-03-18 PROCEDURE — 86901 BLOOD TYPING SEROLOGIC RH(D): CPT | Performed by: SURGERY

## 2023-03-18 PROCEDURE — 250N000013 HC RX MED GY IP 250 OP 250 PS 637: Performed by: SURGERY

## 2023-03-18 PROCEDURE — 84100 ASSAY OF PHOSPHORUS: CPT | Performed by: PHYSICIAN ASSISTANT

## 2023-03-18 PROCEDURE — 250N000013 HC RX MED GY IP 250 OP 250 PS 637: Performed by: INTERNAL MEDICINE

## 2023-03-18 PROCEDURE — 250N000011 HC RX IP 250 OP 636: Performed by: INTERNAL MEDICINE

## 2023-03-18 PROCEDURE — 86850 RBC ANTIBODY SCREEN: CPT | Performed by: SURGERY

## 2023-03-18 PROCEDURE — 250N000013 HC RX MED GY IP 250 OP 250 PS 637: Performed by: PHYSICIAN ASSISTANT

## 2023-03-18 PROCEDURE — 120N000011 HC R&B TRANSPLANT UMMC

## 2023-03-18 PROCEDURE — P9016 RBC LEUKOCYTES REDUCED: HCPCS | Performed by: NURSE PRACTITIONER

## 2023-03-18 PROCEDURE — 80053 COMPREHEN METABOLIC PANEL: CPT | Performed by: PHYSICIAN ASSISTANT

## 2023-03-18 PROCEDURE — 36415 COLL VENOUS BLD VENIPUNCTURE: CPT | Performed by: PHYSICIAN ASSISTANT

## 2023-03-18 PROCEDURE — 86923 COMPATIBILITY TEST ELECTRIC: CPT | Performed by: NURSE PRACTITIONER

## 2023-03-18 PROCEDURE — 83735 ASSAY OF MAGNESIUM: CPT | Performed by: PHYSICIAN ASSISTANT

## 2023-03-18 PROCEDURE — 99232 SBSQ HOSP IP/OBS MODERATE 35: CPT | Mod: 24 | Performed by: INTERNAL MEDICINE

## 2023-03-18 PROCEDURE — 250N000012 HC RX MED GY IP 250 OP 636 PS 637: Performed by: PHYSICIAN ASSISTANT

## 2023-03-18 PROCEDURE — 250N000012 HC RX MED GY IP 250 OP 636 PS 637: Performed by: INTERNAL MEDICINE

## 2023-03-18 PROCEDURE — 85027 COMPLETE CBC AUTOMATED: CPT | Performed by: PHYSICIAN ASSISTANT

## 2023-03-18 PROCEDURE — 99232 SBSQ HOSP IP/OBS MODERATE 35: CPT | Mod: FS | Performed by: TRANSPLANT SURGERY

## 2023-03-18 PROCEDURE — 250N000012 HC RX MED GY IP 250 OP 636 PS 637: Performed by: NURSE PRACTITIONER

## 2023-03-18 PROCEDURE — 258N000001 HC RX 258: Performed by: STUDENT IN AN ORGANIZED HEALTH CARE EDUCATION/TRAINING PROGRAM

## 2023-03-18 PROCEDURE — 250N000013 HC RX MED GY IP 250 OP 250 PS 637: Performed by: NURSE PRACTITIONER

## 2023-03-18 RX ORDER — MYCOPHENOLATE MOFETIL 250 MG/1
1000 CAPSULE ORAL
Status: DISCONTINUED | OUTPATIENT
Start: 2023-03-18 | End: 2023-03-19

## 2023-03-18 RX ORDER — METHOCARBAMOL 500 MG/1
500 TABLET, FILM COATED ORAL 3 TIMES DAILY PRN
Status: DISCONTINUED | OUTPATIENT
Start: 2023-03-18 | End: 2023-03-19 | Stop reason: HOSPADM

## 2023-03-18 RX ADMIN — NYSTATIN 500000 UNITS: 100000 SUSPENSION ORAL at 21:07

## 2023-03-18 RX ADMIN — OXYCODONE HYDROCHLORIDE 5 MG: 5 TABLET ORAL at 10:11

## 2023-03-18 RX ADMIN — PANTOPRAZOLE SODIUM 40 MG: 40 TABLET, DELAYED RELEASE ORAL at 09:17

## 2023-03-18 RX ADMIN — INSULIN GLARGINE 10 UNITS: 100 INJECTION, SOLUTION SUBCUTANEOUS at 21:40

## 2023-03-18 RX ADMIN — MYCOPHENOLIC ACID 720 MG: 360 TABLET, DELAYED RELEASE ORAL at 09:17

## 2023-03-18 RX ADMIN — METHOCARBAMOL 500 MG: 500 TABLET ORAL at 07:08

## 2023-03-18 RX ADMIN — HEPARIN SODIUM 5000 UNITS: 5000 INJECTION, SOLUTION INTRAVENOUS; SUBCUTANEOUS at 14:22

## 2023-03-18 RX ADMIN — URSODIOL 300 MG: 300 CAPSULE ORAL at 09:16

## 2023-03-18 RX ADMIN — MAGNESIUM OXIDE TAB 400 MG (241.3 MG ELEMENTAL MG) 400 MG: 400 (241.3 MG) TAB at 14:23

## 2023-03-18 RX ADMIN — ONDANSETRON 4 MG: 4 TABLET, ORALLY DISINTEGRATING ORAL at 10:12

## 2023-03-18 RX ADMIN — ACETAMINOPHEN 650 MG: 325 TABLET ORAL at 21:35

## 2023-03-18 RX ADMIN — Medication 5 MG: at 21:07

## 2023-03-18 RX ADMIN — PANTOPRAZOLE SODIUM 40 MG: 40 TABLET, DELAYED RELEASE ORAL at 21:07

## 2023-03-18 RX ADMIN — PREDNISONE 5 MG: 5 TABLET ORAL at 09:19

## 2023-03-18 RX ADMIN — FLUOXETINE HYDROCHLORIDE 60 MG: 40 CAPSULE ORAL at 21:34

## 2023-03-18 RX ADMIN — ONDANSETRON 4 MG: 4 TABLET, ORALLY DISINTEGRATING ORAL at 18:37

## 2023-03-18 RX ADMIN — ASPIRIN 325 MG ORAL TABLET 325 MG: 325 PILL ORAL at 09:16

## 2023-03-18 RX ADMIN — NYSTATIN 500000 UNITS: 100000 SUSPENSION ORAL at 14:24

## 2023-03-18 RX ADMIN — ONDANSETRON 4 MG: 4 TABLET, ORALLY DISINTEGRATING ORAL at 21:38

## 2023-03-18 RX ADMIN — TACROLIMUS 2.5 MG: 1 CAPSULE ORAL at 18:37

## 2023-03-18 RX ADMIN — URSODIOL 300 MG: 300 CAPSULE ORAL at 21:07

## 2023-03-18 RX ADMIN — ACETAMINOPHEN 650 MG: 325 TABLET ORAL at 14:23

## 2023-03-18 RX ADMIN — INSULIN GLARGINE 15 UNITS: 100 INJECTION, SOLUTION SUBCUTANEOUS at 09:17

## 2023-03-18 RX ADMIN — METOPROLOL TARTRATE 25 MG: 25 TABLET, FILM COATED ORAL at 09:16

## 2023-03-18 RX ADMIN — NYSTATIN 500000 UNITS: 100000 SUSPENSION ORAL at 09:16

## 2023-03-18 RX ADMIN — MYCOPHENOLATE MOFETIL 1000 MG: 250 CAPSULE ORAL at 18:37

## 2023-03-18 RX ADMIN — METOPROLOL TARTRATE 25 MG: 25 TABLET, FILM COATED ORAL at 21:07

## 2023-03-18 RX ADMIN — DEXTROSE MONOHYDRATE 10 ML: 25 INJECTION, SOLUTION INTRAVENOUS at 06:49

## 2023-03-18 RX ADMIN — Medication 25 MCG: at 09:16

## 2023-03-18 RX ADMIN — B-COMPLEX W/ C & FOLIC ACID TAB 1 MG 1 TABLET: 1 TAB at 09:17

## 2023-03-18 RX ADMIN — OXYCODONE HYDROCHLORIDE 5 MG: 5 TABLET ORAL at 21:35

## 2023-03-18 RX ADMIN — HEPARIN SODIUM 5000 UNITS: 5000 INJECTION, SOLUTION INTRAVENOUS; SUBCUTANEOUS at 01:47

## 2023-03-18 RX ADMIN — TACROLIMUS 2.5 MG: 1 CAPSULE ORAL at 09:17

## 2023-03-18 RX ADMIN — ACETAMINOPHEN 650 MG: 325 TABLET ORAL at 06:27

## 2023-03-18 RX ADMIN — LIDOCAINE PATCH 4% 2 PATCH: 40 PATCH TOPICAL at 09:18

## 2023-03-18 ASSESSMENT — ACTIVITIES OF DAILY LIVING (ADL)
ADLS_ACUITY_SCORE: 35

## 2023-03-18 NOTE — PROGRESS NOTES
[facility]   Transplant Nephrology Progress Note  Date of Admission:  2/11/2023  Today's Date: 03/18/2023    Recommendations:  - Dialysis MWF either in house or at outpatient unit if arranged  - Discharge planning in process.    Assessment & Plan   # ASHISH: Remains oliguric    - ASHISH likely secondary to hemorrhagic shock and hemodynamic changes following liver transplant, as well as mild HRS prior to transplant.  - HD Info  Access: Right IJ Tunneled Catheter (placed 3/12), Days: MWF, Length: 4.0 hrs, EDW: 69.0 kg, Heparin: No, MARISEL: No, IV Iron: No, Vit D analog: No  - Awaiting dialysis unit placement   - Baseline Creatinine: ~ 0.6-0.8   - Proteinuria: Normal (<0.2 grams)    # Liver Tx: ESLD secondary to alcoholic cirrhosis, s/p OLT 2/28/23.  Transaminases and total bilirubin now normal.  Followed by Transplant Surgery.    # Immunosuppression: Tacrolimus immediate release (goal 6-8), Mycophenolic acid (dose 360 mg every 12 hours) and Prednisone (dose 5 mg daily)   - Induction with Recent Transplant:  Per Liver Tx protocol.   - Changes: Not at this time; Management per Transplant Surgery.    # Infection Prophylaxis:   - PJP: Sulfa/TMP (Bactrim)  - CMV: Valganciclovir (Valcyte); CMV IgG Ab recipient and donor negative    # Blood Pressure: Controlled, but low at times;  Goal BP: < 140/90 (Hospitalization goal)   - Volume status: Euvolemic  EDW ~ 69.0 kg   - Changes: No     # Diabetes: Controlled (HbA1c <7%) Last HbA1c: 6.1%   - Management as per primary team.    # Anemia in Chronic Disease: Hgb: Trend down, agree with PRBC today      MARISEL: No   - Iron studies: Low iron saturation, but high ferritin (2/22/23)    # Mineral Bone Disorder:   - Vitamin D; level: Low (12/20/22)        On supplement: Yes, cholecalciferol 25 mcg PO daily.  - Calcium; level: Normal        On supplement: No  - Phosphorus; level: Normal        On binder: No    # Electrolytes:   - Potassium; level: Normal         On supplement: No, modulate with HD  - Magnesium; level: Normal        On supplement: Yes, magnesium oxide 400 mg PO QD  - Bicarbonate; level: Normal        On supplement: No   -Hyponatremia. Modulate with HD    # Diarrhea (resolved): Some loose stools.  Now started on fiber.  C. diff negative 3/5.    # EBV IgG Ab Discordance (D+/R-): Recoommend surveillance EBV PCR qmonth until 12 months post transplant, then q3 months until 2 years post transplant.    # Aspergers/Depression/Hallucinations: Patient appears to be having less hallucinations per his mother.  Seen by Psychiatry and started on olanzapine, as well as continuing on fluoxetine.     # Transplant History:  Etiology of Organ Failure: Alcoholic cirrhosis  Tx: Liver Tx  Transplant: 2/28/2023 (Liver)  Donor Type:  Donor Class:   Crossmatch at time of Tx: negative  DSA at time of Tx: No   Significant changes in immunosuppression: Lower CNI goal due to ASHISH  Significant transplant-related complications: ASHISH    Recommendations were communicated to the primary team verbally.    Anderson Jones MD   Pager: [phone]      Interval History   Had dialysis yesterday and tolerated 2.5L UF. He was somnolent this morning and was attributed to muscle relaxor. He was more awake with me.       Review of Systems   4 point ROS was obtained and negative except as noted in the Interval History.    MEDICATIONS:    aspirin  325 mg Per Feeding Tube Daily     FLUoxetine  60 mg Oral At Bedtime     heparin ANTICOAGULANT  5,000 Units Subcutaneous Q12H     insulin aspart   Subcutaneous TID w/meals     insulin glargine  10 Units Subcutaneous QPM     [START ON 3/19/2023] insulin glargine  15 Units Subcutaneous QAM AC     lidocaine  2 patch Transdermal Q24H     lidocaine   Transdermal Q8H GARETH     magnesium oxide  400 mg Oral Q24H     melatonin  5 mg Oral QPM     metoprolol tartrate  25 mg Oral BID     multivitamin RENAL  1 tablet Oral Daily     mycophenolic acid  720 mg Oral BID IS      nystatin  500,000 Units Swish & Swallow 4x Daily     pantoprazole  40 mg Oral BID     predniSONE  5 mg Oral Daily     sodium chloride (PF)  3 mL Intravenous Q8H     sulfamethoxazole-trimethoprim  1 tablet Oral or Feeding Tube Once per day on      tacrolimus  2.5 mg Oral BID IS     ursodiol  300 mg Oral BID     valGANciclovir  450 mg Oral Once per day on      cholecalciferol  25 mcg Oral or Feeding Tube Daily       dextrose Stopped (23 5824)     dextrose 1,000 mL (03/10/23 2553)       Physical Exam   Temp  Av.6  F (36.4  C)  Min: 92.8  F (33.8  C)  Max: 99.5  F (37.5  C)  Arterial Line BP  Min: 78/56  Max: 173/78  Arterial Line MAP (mmHg)  Av.4 mmHg  Min: 56 mmHg  Max: 190 mmHg      Pulse  Av.4  Min: 62  Max: 112 Resp  Avg: 15.7  Min: 8  Max: 34  FiO2 (%)  Av.5 %  Min: 30 %  Max: 100 %  SpO2  Av.6 %  Min: 84 %  Max: 100 %    CVP (mmHg): 8 mmHgBP 114/61   Pulse 82   Temp 99.1  F (37.3  C) (Oral)   Resp 16   Wt 70.1 kg (154 lb 8.7 oz)   SpO2 97%   BMI 25.72 kg/m     Date 23 0700 - 23 0659   Shift 0235-0923 5859-8196 0810-4382 24 Hour Total   INTAKE   I.V. 156   156   NG/GT 80   80   Enteral 160   160   Shift Total(mL/kg) 396(5.04)   396(5.04)   OUTPUT   Shift Total(mL/kg)       Weight (kg) 78.5 78.5 78.5 78.5      Admit Weight: 65.9 kg (145 lb 4.5 oz)     GENERAL APPEARANCE: Pt sleeping at time of visit.  Awakens to voice.  HENT: mouth without ulcers or lesions, NJ in place  RESP: lungs clear to auscultation - no rales, rhonchi or wheezes  CV: regular rhythm, normal rate, no rub, no murmur  EDEMA: No  LE and dependent edema bilaterally  ABDOMEN: soft, nondistended, mild TTP, bowel sounds normal  MS: extremities normal - no gross deformities noted, no evidence of inflammation in joints, no muscle tenderness  SKIN: no rash  DIALYSIS ACCESS:  Right IJ tunneled catheter     Data   All labs reviewed by me.  CMP  Recent Labs   Lab 23  1131  03/18/23  0909 03/18/23  0714 03/18/23  0702 03/18/23  0646 03/18/23  0618 03/17/23  0759 03/17/23  0546 03/16/23  0903 03/16/23  0635 03/15/23  1003 03/15/23  0635   NA  --   --   --   --   --  132*  --  125*  --  128*  --  128*   POTASSIUM  --   --   --   --   --  3.9  --  5.0  --  4.3  --  4.0   CHLORIDE  --   --   --   --   --  95*  --  90*  --  93*  --  90*   CO2  --   --   --   --   --  27  --  25  --  25  --  27   ANIONGAP  --   --   --   --   --  10  --  10  --  10  --  11   * 110* 104* 85   < > 68*   < > 217*   < > 242*   < > 125*   BUN  --   --   --   --   --  39.6*  --  81.4*  --  54.0*  --  58.0*   CR  --   --   --   --   --  1.96*  --  3.18*  --  2.53*  --  2.89*   GFRESTIMATED  --   --   --   --   --  47*  --  26*  --  34*  --  29*   SARTHAK  --   --   --   --   --  9.2  --  9.4  --  9.4  --  9.7   MAG  --   --   --   --   --  1.8  --  2.1  --  1.9  --  2.0   PHOS  --   --   --   --   --  4.0  --  4.9*  --  3.5  --  3.5   PROTTOTAL  --   --   --   --   --  5.7*  --  5.3*  --  5.8*  --  5.9*   ALBUMIN  --   --   --   --   --  3.4*  --  3.0*  --  3.3*  --  3.4*   BILITOTAL  --   --   --   --   --  1.0  --  1.0  --  1.1  --  1.2   ALKPHOS  --   --   --   --   --  259*  --  281*  --  311*  --  326*   AST  --   --   --   --   --  18  --  16  --  24  --  20   ALT  --   --   --   --   --  13  --  14  --  15  --  18    < > = values in this interval not displayed.     CBC  Recent Labs   Lab 03/18/23  0618 03/17/23  0546 03/16/23  0635 03/15/23  0635   HGB 7.2* 7.4* 8.0* 8.5*   WBC 7.1 10.4 10.4 11.9*   RBC 2.24* 2.31* 2.51* 2.66*   HCT 23.3* 23.6* 26.2* 27.4*   * 102* 104* 103*   MCH 32.1 32.0 31.9 32.0   MCHC 30.9* 31.4* 30.5* 31.0*   RDW 19.9* 19.8* 20.1* 19.9*    265 254 234     INR  No lab results found in last 7 days.  ABG  No lab results found in last 7 days.   Urine Studies  Recent Labs   Lab Test 03/12/23  1134 02/27/23  1616 02/24/23  1818 02/22/23  1421   COLOR Dark Yellow* Yellow  Light Yellow Yellow   APPEARANCE Cloudy* Clear Clear Clear   URINEGLC 30* Negative Negative Negative   URINEBILI Small* Negative Negative Negative   URINEKETONE Negative Negative Negative Negative   SG 1.027 1.012 1.009 1.010   UBLD Moderate* Negative Negative Negative   URINEPH 5.0 5.5 5.0 5.0   PROTEIN 100* 30* Negative Negative   NITRITE Negative Negative Negative Negative   LEUKEST Small* Negative Negative Negative   RBCU 20* <1 <1 1   WBCU 112* 2 1 2     No lab results found.  PTH  No lab results found.  Iron Studies  Recent Labs   Lab Test 02/22/23  0610 02/20/23  0730 02/16/23  0543 12/20/22  0703 10/06/22  0958 04/07/22  0624   IRON 26*  --   --  249*  --  85   *  --   --   --   --   --    IRONSAT 19  --   --   --   --   --    ASCENCION 1,465* 1,335* 1,725* 2,207* 2,042* 423*       IMAGING:  All imaging studies reviewed by me.

## 2023-03-18 NOTE — PROGRESS NOTES
I called [facility] at [phone] and LM for them to call me back today with an admission/acceptance update or page tomorrow to [phone].  Jewels Forde RN, BSN, PHN  [facility] Pager [phone].

## 2023-03-18 NOTE — PLAN OF CARE
VS: /64 (BP Location: Left arm)   Pulse 78   Temp 99  F (37.2  C) (Oral)   Resp 18   Wt 70.1 kg (154 lb 8.7 oz)   SpO2 98%   BMI 25.72 kg/m      Cares: 1900 - 0730     Neuro: Aox4   Cardio: WDL   Respiratory: on RA, dyspnea on exertion  GI/: oliguric (pt on HD), LBM 3-17  Skin: clamshell abdominal incision - CHERYL, scabbing on chest area   Diet: Regular (calorie count)   Labs: waiting on morning lab results   BG: Q4H ()  LDA: right chest HD access  Mobility: SBA with walker   Pain/Nausea: right sided abdominal/rib pain, denies nausea   PRN medications: robaxin x2, Zyprexa x1  Changes: NJ was pulled last night   Plan of Care: plan for discharge this weekend or by Monday. Continue with current POC and update MD with any changes

## 2023-03-18 NOTE — PROGRESS NOTES
Calorie Count  Intake recorded for: 3/17  Total Kcals: 0 Total Protein: 0g  Kcals from Hospital Food: 0   Protein: 0g  Kcals from Outside Food (average):0 Protein: 0g  # Meals Ordered from Kitchen: 1 meal ordered   # Meals Recorded: no food intake recorded   # Supplements Recorded:  No intake recorded

## 2023-03-18 NOTE — PROGRESS NOTES
/68   Pulse 77   Temp 98.4  F (36.9  C) (Oral)   Resp 16   Wt 70.1 kg (154 lb 8.7 oz)   SpO2 97%   BMI 25.72 kg/m      Shift: 3753-2686  Isolation Status: NA  VSS on RA, afebrile  Neuro: Aox4  Behaviors: Calm & cooperative  B  Labs: Hgb 7.2 Received 1 unit of blood   Respiratory: SOB with exertion  Cardiac: WDL  Pain/Nausea: Pain managed with oxycodone  PRN: Oxycodone at 1000  Diet: Regular  IV Access: PIV  GI/: BM 3/18 Oliguric/ HD  Skin: No new concerns  Mobility: Independent with walker  Events/Education: Discharge education  Plan: Discharge Ashutosh morning.

## 2023-03-18 NOTE — PROGRESS NOTES
Immunosuppression Management Note:    Dillon Salter is a 27 year old male who is seen today  for immunosuppression management     I, John Rondon MD, I have examined the patient with our CHRISTA/Fellow as part of a shared visit.   I participated in the rounds,  discussed and agree with the note and findings and  reviewed today's vital signs, medications, labs and imaging as noted in this note.  I  reviewed the  immunosuppression medications.  I personally provided a substantive portion of the care of this patient. I personally performed the immunosuppressive management of this patient, reviewed the overall  immunosuppression including drug levels, allograft function and provided the recommendations to adjust the dose to provide optimal levels to prevent rejection of the allograft and prevent toxicity to the organs. This was complex care due to the fresh allograft.   Time spent: evaluating patient, examining patient, discussion of plan, counseling and documentation: >35 min   I spoke to the patient/family and explained below clinical details and answered all the questions    Transplant Surgery  Inpatient Daily Progress Note  2023    Assessment & Plan: Dillon Salter is a 27 year old male with a past medical history of Asperger's, DM2, and alcoholic cirrhosis c/b esophageal varices and hepatic encephalopathy. He is now s/p  donor liver transplant without stent on 23 with Dr. Sterling. Return to OR for washout and repair of arterial bleeding on 3/1/23.    s/p DDLT 23: POD #18.   Course: Transaminases trending down appropriately after transplant until ~ POD #9 when IS subtherapeutic. Liver US with patent flow. MRCP 3/10 with abrubt caliber change in CBD. Narrowing of the common hepatic duct and central intrahepatic ducts.    Today: Total bilirubin, AST and ALT wnl. Alkaline phosphatase slowly downtrending, down to 259.   - ASA 325mg daily  - Ursodiol 300mg BID    Imaging:  -3/1 US:  Please schedule patient for procedure. Please refer to pre-op data and checklist in note. resolution of hematoma, patent vessels  -3/3 US: Patent, resolution of perihepatic hematoma with new perihepatic hematoma up to 9 cm.  -3/7 US with good flow.   -3/10 MRCP: Abrupt caliber change in the common bile duct which may be related to anastomosis. Mild periportal edema. Multiple perihepatic fluid collections.    Immunosuppression management:  Induction: Steroid taper and basiliximab x2 per renal sparing protocol.  Maintenance:   - mg BID --> reduced to 500mg BID in setting of diarrhea --> changed to Myfortic 360 mg BID (3/8/2023) --> increased to 720mg BID (3/17/2023)  -Tacrolimus: decreased to 2.5mg BID; goal level changed to 6-8 for renal sparing (on 3/17/2023)  -Prednisone 5mg daily    Neuro/Psych:  Acute post op pain in the setting of chronic musculoskeletal + neuropathic pain pain: On oxycodone PTA for neuropathy since ~ 8/2022. Previously had tried gabapentin without benefit.   - Reduced oxycodone to 2.5 to 5mg to q12h prn on 3/16/2023 --> reduce to 2.5mg q12h PRN on 3/17 and then off on 3/18/2023.   - Encourage tylenol use prior to opioid use  - Robaxin 500mg q6h PRN-reduce to Q8 PRN  - Minimize narcotic use due to sedation and falls  * Pain consult placed for non-opioid management of chronic + neuropathic pain; no indication for chronic opioid use - recommended topical agents and taper of oxycodone.   MDD, Anxiety, Autism spectrum: PTA fluoxetine.   Acute delirium, RESOLVED: Post operatively had ICU delirium with hallucinations. Zyprexa discontinued.   Hematology:   Leukocytosis: Noted to have a leukocytosis to 12 on 3/15 AM, but afebrile. Improved now, WNL.   Anemia of chronic disease/Acute blood loss: Hgb slow downtrend, 7.2 today. Transfuse 1 PRBC without overt s/sx of blood loss.   Thrombocytopenia: PLT improving,>200    Cardiorespiratory:   Post op ventilatory support/hypercapnia/acute pulmonary edema: RESOLVED. Extubated 3/3. Lethargic and hypercapnic 3/4 AM, improved with BiPAP. Volume  removal on HD. Stable on ambient air.   Hemorrhagic shock: Secondary to bleeding. Resolved.     GI/Nutrition:   Severe malnutrition in the context of acute illness: Nutrition consulted. FT in place, change EN Osmolite 1.5 @ 50/hr x12 +2 pkts Prosource.  Remove feeding tube today given meeting calorie goals. Continue to Encourage PO intake.   Diarrhea: May be related to tube feeding vs cellcept. Cellcept changed to Myfortic. Now Off EN. Cdiff neg, On Fiber TID. Improved.    Endocrine:   DM2; Steroid induced hyperglycemia: initially managed with insulin gtt --> transitioned to sliding scale insulin. Lantus 15 units BID + sliding scale insulin PRN.   Hypoglycemic overnight. Reduce lantus to 15 units qam and 10units qpm.    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 CRRT started 3/1/23 -- > transitioned to iHD 3/4. Tunneled line placed by IR on 3/10.   * HD per nephrology, plan [facility]  Hypervolemia: Volume removal with HD.     : Oliguric, lowered tacrolimus goal on 3/17/23. Monitor for renal recovery    Prophylaxis: DVT (mechanical), fall, GI (PPI), fungal (Initially received fluconazole --> Micafungin d/t CRRT/re-operation --> nystatin), viral (Valcyte), pneumocystis (Bactrim -restarted 3/6)     Disposition: 7A, plan to discharge home tomorrow with mother.     CHRISTA/Fellow/Resident Provider: Julieth Leblanc NP      Faculty: John Rondon M.D.    __________________________________________________________________  Transplant History:    2/28/2023 (Liver), Postoperative day: 18     Interval History:  Overnight, no acute events. Afebrile.    Appears sleepy/groggy this am.  NJT removed. Poor po intake on nadja count. HD yesterday. Walking with a walker - stand by assist.     ROS:   A 10-point review of systems was negative except as noted above.    Curent Meds:   aspirin  325 mg Per Feeding Tube Daily     FLUoxetine  60 mg Oral At Bedtime    heparin ANTICOAGULANT  5,000 Units Subcutaneous Q12H    insulin aspart   Subcutaneous TID w/meals    insulin glargine  15 Units Subcutaneous BID    lidocaine  2 patch Transdermal Q24H    lidocaine   Transdermal Q8H GARETH    magnesium oxide  400 mg Oral Q24H    melatonin  5 mg Oral QPM    metoprolol tartrate  25 mg Oral BID    multivitamin RENAL  1 tablet Oral Daily    mycophenolic acid  720 mg Oral BID IS    nystatin  500,000 Units Swish & Swallow 4x Daily    pantoprazole  40 mg Oral BID    predniSONE  5 mg Oral Daily    sodium chloride (PF)  3 mL Intravenous Q8H    sulfamethoxazole-trimethoprim  1 tablet Oral or Feeding Tube Once per day on Mon Wed Fri    tacrolimus  2.5 mg Oral BID IS    ursodiol  300 mg Oral BID    valGANciclovir  450 mg Oral Once per day on Mon Fri    cholecalciferol  25 mcg Oral or Feeding Tube Daily       Physical Exam:     Admit Weight: 65.9 kg (145 lb 4.5 oz)    Current Vitals:   /64 (BP Location: Left arm)   Pulse 78   Temp 99  F (37.2  C) (Oral)   Resp 18   Wt 70.1 kg (154 lb 8.7 oz)   SpO2 98%   BMI 25.72 kg/m      Vital sign ranges:    Temp:  [97.5  F (36.4  C)-99.4  F (37.4  C)] 99  F (37.2  C)  Pulse:  [70-80] 78  Resp:  [10-27] 18  BP: ()/(53-91) 118/64  SpO2:  [87 %-100 %] 98 %    General Appearance: No acute distress,sleepy, easy to arouse  Skin: warm, dry  Heart: RRR  Lungs: Breathing comfortably on ambient air  Abdomen: abdomen soft, non-distended. Incision c/d/i. Resolving eschar above incision site without overlying erythema and purulence.  Extremities: edema: 1+ Mahendra edema bilaterally  Skin: No jaundice.   - RIJ dialysis tunneled line  Neurologic: A&Ox 3    Data:   CMP  Recent Labs   Lab 03/18/23  0909 03/18/23  0714 03/18/23  0646 03/18/23  0618 03/17/23  0759 03/17/23  0546   NA  --   --   --  132*  --  125*   POTASSIUM  --   --   --  3.9  --  5.0   CHLORIDE  --   --   --  95*  --  90*   CO2  --   --   --  27  --  25   GLC  110* 104*   < > 68*   < > 217*   BUN  --   --   --  39.6*  --  81.4*   CR  --   --   --  1.96*  --  3.18*   GFRESTIMATED  --   --   --  47*  --  26*   SARTHAK  --   --   --  9.2  --  9.4   MAG  --   --   --  1.8  --  2.1   PHOS  --   --   --  4.0  --  4.9*   ALBUMIN  --   --   --  3.4*  --  3.0*   BILITOTAL  --   --   --  1.0  --  1.0   ALKPHOS  --   --   --  259*  --  281*   AST  --   --   --  18  --  16   ALT  --   --   --  13  --  14    < > = values in this interval not displayed.     CBC  Recent Labs   Lab 03/18/23  0618 03/17/23  0546 03/12/23  0721 03/12/23  0006   HGB 7.2* 7.4*   < >  --    WBC 7.1 10.4   < >  --     265   < >  --    A1C  --   --   --  5.2    < > = values in this interval not displayed.

## 2023-03-19 VITALS
SYSTOLIC BLOOD PRESSURE: 124 MMHG | BODY MASS INDEX: 25.72 KG/M2 | DIASTOLIC BLOOD PRESSURE: 82 MMHG | TEMPERATURE: 98.3 F | WEIGHT: 154.54 LBS | RESPIRATION RATE: 15 BRPM | OXYGEN SATURATION: 100 % | HEART RATE: 71 BPM

## 2023-03-19 LAB
ALBUMIN SERPL BCG-MCNC: 3.3 G/DL (ref 3.5–5.2)
ALP SERPL-CCNC: 269 U/L (ref 40–129)
ALT SERPL W P-5'-P-CCNC: 10 U/L (ref 10–50)
ANION GAP SERPL CALCULATED.3IONS-SCNC: 11 MMOL/L (ref 7–15)
AST SERPL W P-5'-P-CCNC: 22 U/L (ref 10–50)
BILIRUB SERPL-MCNC: 1.1 MG/DL
BUN SERPL-MCNC: 59.9 MG/DL (ref 6–20)
CALCIUM SERPL-MCNC: 9.5 MG/DL (ref 8.6–10)
CHLORIDE SERPL-SCNC: 95 MMOL/L (ref 98–107)
CREAT SERPL-MCNC: 2.58 MG/DL (ref 0.67–1.17)
DEPRECATED HCO3 PLAS-SCNC: 23 MMOL/L (ref 22–29)
ERYTHROCYTE [DISTWIDTH] IN BLOOD BY AUTOMATED COUNT: 19 % (ref 10–15)
GFR SERPL CREATININE-BSD FRML MDRD: 34 ML/MIN/1.73M2
GLUCOSE BLDC GLUCOMTR-MCNC: 117 MG/DL (ref 70–99)
GLUCOSE BLDC GLUCOMTR-MCNC: 130 MG/DL (ref 70–99)
GLUCOSE BLDC GLUCOMTR-MCNC: 154 MG/DL (ref 70–99)
GLUCOSE SERPL-MCNC: 129 MG/DL (ref 70–99)
HCT VFR BLD AUTO: 29.6 % (ref 40–53)
HGB BLD-MCNC: 9.3 G/DL (ref 13.3–17.7)
MAGNESIUM SERPL-MCNC: 2.3 MG/DL (ref 1.7–2.3)
MCH RBC QN AUTO: 32.3 PG (ref 26.5–33)
MCHC RBC AUTO-ENTMCNC: 31.4 G/DL (ref 31.5–36.5)
MCV RBC AUTO: 103 FL (ref 78–100)
PHOSPHATE SERPL-MCNC: 5.5 MG/DL (ref 2.5–4.5)
PLATELET # BLD AUTO: 361 10E3/UL (ref 150–450)
POTASSIUM SERPL-SCNC: 4.9 MMOL/L (ref 3.4–5.3)
PROT SERPL-MCNC: 6 G/DL (ref 6.4–8.3)
RBC # BLD AUTO: 2.88 10E6/UL (ref 4.4–5.9)
SODIUM SERPL-SCNC: 129 MMOL/L (ref 136–145)
WBC # BLD AUTO: 8 10E3/UL (ref 4–11)

## 2023-03-19 PROCEDURE — 250N000013 HC RX MED GY IP 250 OP 250 PS 637: Performed by: SURGERY

## 2023-03-19 PROCEDURE — 250N000013 HC RX MED GY IP 250 OP 250 PS 637: Performed by: PHYSICIAN ASSISTANT

## 2023-03-19 PROCEDURE — 250N000013 HC RX MED GY IP 250 OP 250 PS 637: Performed by: STUDENT IN AN ORGANIZED HEALTH CARE EDUCATION/TRAINING PROGRAM

## 2023-03-19 PROCEDURE — 250N000012 HC RX MED GY IP 250 OP 636 PS 637: Performed by: NURSE PRACTITIONER

## 2023-03-19 PROCEDURE — 80053 COMPREHEN METABOLIC PANEL: CPT | Performed by: PHYSICIAN ASSISTANT

## 2023-03-19 PROCEDURE — 90937 HEMODIALYSIS REPEATED EVAL: CPT

## 2023-03-19 PROCEDURE — 250N000012 HC RX MED GY IP 250 OP 636 PS 637: Performed by: PHYSICIAN ASSISTANT

## 2023-03-19 PROCEDURE — 250N000013 HC RX MED GY IP 250 OP 250 PS 637: Performed by: NURSE PRACTITIONER

## 2023-03-19 PROCEDURE — 99024 POSTOP FOLLOW-UP VISIT: CPT | Mod: FS | Performed by: TRANSPLANT SURGERY

## 2023-03-19 PROCEDURE — 258N000003 HC RX IP 258 OP 636: Performed by: INTERNAL MEDICINE

## 2023-03-19 PROCEDURE — 85027 COMPLETE CBC AUTOMATED: CPT | Performed by: PHYSICIAN ASSISTANT

## 2023-03-19 PROCEDURE — 36415 COLL VENOUS BLD VENIPUNCTURE: CPT | Performed by: PHYSICIAN ASSISTANT

## 2023-03-19 PROCEDURE — 99233 SBSQ HOSP IP/OBS HIGH 50: CPT | Mod: 24 | Performed by: INTERNAL MEDICINE

## 2023-03-19 PROCEDURE — 83735 ASSAY OF MAGNESIUM: CPT | Performed by: PHYSICIAN ASSISTANT

## 2023-03-19 PROCEDURE — 250N000011 HC RX IP 250 OP 636: Performed by: INTERNAL MEDICINE

## 2023-03-19 PROCEDURE — 84100 ASSAY OF PHOSPHORUS: CPT | Performed by: PHYSICIAN ASSISTANT

## 2023-03-19 RX ORDER — MYCOPHENOLATE MOFETIL 250 MG/1
750 CAPSULE ORAL 2 TIMES DAILY
Qty: 180 CAPSULE | Refills: 1 | Status: SHIPPED | OUTPATIENT
Start: 2023-03-19 | End: 2023-03-28

## 2023-03-19 RX ORDER — VALGANCICLOVIR 450 MG/1
450 TABLET, FILM COATED ORAL
Status: DISCONTINUED | OUTPATIENT
Start: 2023-03-21 | End: 2023-03-19 | Stop reason: HOSPADM

## 2023-03-19 RX ORDER — PANTOPRAZOLE SODIUM 40 MG/1
40 TABLET, DELAYED RELEASE ORAL 2 TIMES DAILY
Qty: 60 TABLET | Refills: 0 | Status: SHIPPED | OUTPATIENT
Start: 2023-03-19 | End: 2023-03-28

## 2023-03-19 RX ORDER — ACETAMINOPHEN 325 MG/1
650 TABLET ORAL EVERY 8 HOURS PRN
Qty: 100 TABLET | Refills: 0 | Status: SHIPPED | OUTPATIENT
Start: 2023-03-19

## 2023-03-19 RX ORDER — TACROLIMUS 0.5 MG/1
2 CAPSULE ORAL 2 TIMES DAILY
Qty: 120 CAPSULE | Refills: 1 | Status: SHIPPED | OUTPATIENT
Start: 2023-03-19 | End: 2023-03-19

## 2023-03-19 RX ORDER — VALGANCICLOVIR 450 MG/1
450 TABLET, FILM COATED ORAL
Qty: 30 TABLET | Refills: 2 | Status: SHIPPED | OUTPATIENT
Start: 2023-03-20 | End: 2023-03-28

## 2023-03-19 RX ORDER — ONDANSETRON 4 MG/1
4 TABLET, ORALLY DISINTEGRATING ORAL EVERY 6 HOURS PRN
Qty: 10 TABLET | Refills: 0 | Status: SHIPPED | OUTPATIENT
Start: 2023-03-19 | End: 2023-04-06

## 2023-03-19 RX ORDER — METHOCARBAMOL 500 MG/1
500 TABLET, FILM COATED ORAL 3 TIMES DAILY PRN
Qty: 15 TABLET | Refills: 0 | Status: SHIPPED | OUTPATIENT
Start: 2023-03-19 | End: 2023-04-14

## 2023-03-19 RX ORDER — TACROLIMUS 1 MG/1
2 CAPSULE ORAL 2 TIMES DAILY
Qty: 120 CAPSULE | Refills: 1 | Status: SHIPPED | OUTPATIENT
Start: 2023-03-19 | End: 2023-03-28

## 2023-03-19 RX ORDER — MYCOPHENOLATE MOFETIL 250 MG/1
750 CAPSULE ORAL
Status: DISCONTINUED | OUTPATIENT
Start: 2023-03-19 | End: 2023-03-19 | Stop reason: HOSPADM

## 2023-03-19 RX ORDER — TACROLIMUS 0.5 MG/1
0.5 CAPSULE ORAL 2 TIMES DAILY
Qty: 60 CAPSULE | Refills: 0 | Status: SHIPPED | OUTPATIENT
Start: 2023-03-19 | End: 2023-04-25

## 2023-03-19 RX ORDER — MAGNESIUM OXIDE 400 MG/1
400 TABLET ORAL EVERY 24 HOURS
Qty: 30 TABLET | Refills: 0 | Status: SHIPPED | OUTPATIENT
Start: 2023-03-19 | End: 2023-03-28

## 2023-03-19 RX ORDER — LIDOCAINE 4 G/G
2 PATCH TOPICAL EVERY 24 HOURS
Qty: 15 PATCH | Refills: 0 | Status: SHIPPED | OUTPATIENT
Start: 2023-03-19 | End: 2023-04-27

## 2023-03-19 RX ADMIN — PANTOPRAZOLE SODIUM 40 MG: 40 TABLET, DELAYED RELEASE ORAL at 08:59

## 2023-03-19 RX ADMIN — METOPROLOL TARTRATE 25 MG: 25 TABLET, FILM COATED ORAL at 09:00

## 2023-03-19 RX ADMIN — SODIUM CHLORIDE 300 ML: 9 INJECTION, SOLUTION INTRAVENOUS at 11:56

## 2023-03-19 RX ADMIN — PREDNISONE 5 MG: 5 TABLET ORAL at 09:00

## 2023-03-19 RX ADMIN — CALCIUM CARBONATE (ANTACID) CHEW TAB 500 MG 500 MG: 500 CHEW TAB at 00:24

## 2023-03-19 RX ADMIN — HEPARIN SODIUM 5000 UNITS: 5000 INJECTION, SOLUTION INTRAVENOUS; SUBCUTANEOUS at 02:14

## 2023-03-19 RX ADMIN — ACETAMINOPHEN 650 MG: 325 TABLET ORAL at 09:00

## 2023-03-19 RX ADMIN — MYCOPHENOLATE MOFETIL 1000 MG: 250 CAPSULE ORAL at 09:01

## 2023-03-19 RX ADMIN — Medication 25 MCG: at 09:00

## 2023-03-19 RX ADMIN — TACROLIMUS 2.5 MG: 1 CAPSULE ORAL at 09:01

## 2023-03-19 RX ADMIN — URSODIOL 300 MG: 300 CAPSULE ORAL at 09:00

## 2023-03-19 RX ADMIN — OXYCODONE HYDROCHLORIDE 5 MG: 5 TABLET ORAL at 15:20

## 2023-03-19 RX ADMIN — ASPIRIN 325 MG ORAL TABLET 325 MG: 325 PILL ORAL at 08:59

## 2023-03-19 RX ADMIN — METHOCARBAMOL 500 MG: 500 TABLET ORAL at 00:24

## 2023-03-19 RX ADMIN — SODIUM CHLORIDE 250 ML: 9 INJECTION, SOLUTION INTRAVENOUS at 11:57

## 2023-03-19 RX ADMIN — Medication: at 11:57

## 2023-03-19 RX ADMIN — LIDOCAINE PATCH 4% 2 PATCH: 40 PATCH TOPICAL at 08:58

## 2023-03-19 RX ADMIN — NYSTATIN 500000 UNITS: 100000 SUSPENSION ORAL at 08:59

## 2023-03-19 RX ADMIN — B-COMPLEX W/ C & FOLIC ACID TAB 1 MG 1 TABLET: 1 TAB at 08:59

## 2023-03-19 ASSESSMENT — ACTIVITIES OF DAILY LIVING (ADL)
ADLS_ACUITY_SCORE: 35
ADLS_ACUITY_SCORE: 35
ADLS_ACUITY_SCORE: 38
ADLS_ACUITY_SCORE: 35
ADLS_ACUITY_SCORE: 38
ADLS_ACUITY_SCORE: 35

## 2023-03-19 NOTE — PROGRESS NOTES
Calorie Count  Intake recorded for: 3/18  Total Kcals: 703 Total Protein: 19g  Kcals from Hospital Food: 323   Protein: 17g  Kcals from Outside Food (average):380 Protein: 2g  # Meals Ordered from Kitchen: 3  # Meals Recorded: 1 (Meal 1: 100% 8oz skim milk; 75% mashed potatoes; 50% pollock)     (Snacks: 100% narinder sweet tea, 1 bag mini muffins, 8 oz apple juice)  # Supplements Recorded: 0

## 2023-03-19 NOTE — PROGRESS NOTES
[facility]   Transplant Nephrology Progress Note  Date of Admission:  2/11/2023  Today's Date: 03/19/2023    Recommendations:  - Dialysis today x3h. Next HD will be at [facility] unit on Weds  - Discharge planning in process.    Assessment & Plan   # ASHISH: Remains oliguric    - ASHISH likely secondary to hemorrhagic shock and hemodynamic changes following liver transplant, as well as mild HRS prior to transplant.  - HD Info  Access: Right IJ Tunneled Catheter (placed 3/12), Days: MWF, Length: 4.0 hrs, EDW: 69.0 kg, Heparin: No, MARISEL: No, IV Iron: No, Vit D analog: No  - Awaiting dialysis unit placement but plan is for iHD on Weds at [facility]. Plan to dialyze x3h today and next HD on Weds. Case management will follow up with patient's chair time   - Baseline Creatinine: ~ 0.6-0.8   - Proteinuria: Normal (<0.2 grams)    # Liver Tx: ESLD secondary to alcoholic cirrhosis, s/p OLT 2/28/23.  Transaminases and total bilirubin now normal.  Followed by Transplant Surgery.    # Immunosuppression: Tacrolimus immediate release (goal 6-8), Mycophenolic acid (dose 360 mg every 12 hours) and Prednisone (dose 5 mg daily)   - Induction with Recent Transplant:  Per Liver Tx protocol.   - Changes: Not at this time; Management per Transplant Surgery.    # Infection Prophylaxis:   - PJP: Sulfa/TMP (Bactrim)  - CMV: Valganciclovir (Valcyte); CMV IgG Ab recipient and donor negative    # Blood Pressure: Controlled;  Goal BP: < 140/90 (Hospitalization goal)   - Volume status: Euvolemic  EDW ~ 69.0 kg   - Changes: No     # Diabetes: Controlled (HbA1c <7%) Last HbA1c: 6.1%   - Management as per primary team.    # Anemia in Chronic Disease: Hgb: Trend up after PRBC 3/18 MARISEL: No   - Iron studies: Low iron saturation, but high ferritin (2/22/23)    # Mineral Bone Disorder:   - Vitamin D; level: Low (12/20/22)        On supplement: Yes, cholecalciferol 25 mcg PO daily.  - Calcium; level:  Normal        On supplement: No  - Phosphorus; level: Normal        On binder: No    # Electrolytes:   - Potassium; level: High normal        On supplement: No, modulate with HD  - Magnesium; level: Normal        On supplement: Yes, magnesium oxide 400 mg PO QD  - Bicarbonate; level: Normal        On supplement: No   -Hyponatremia. Modulate with HD    # Diarrhea (resolved): Some loose stools.  Now started on fiber.  C. diff negative 3/5.    # EBV IgG Ab Discordance (D+/R-): Recoomend surveillance EBV PCR qmonth until 12 months post transplant, then q3 months until 2 years post transplant.    # Aspergers/Depression/Hallucinations: Patient appears to be having less hallucinations per his mother.  Seen by Psychiatry and started on olanzapine, as well as continuing on fluoxetine.     # Transplant History:  Etiology of Organ Failure: Alcoholic cirrhosis  Tx: Liver Tx  Transplant: 2/28/2023 (Liver)  Donor Type:  Donor Class:   Crossmatch at time of Tx: negative  DSA at time of Tx: No   Significant changes in immunosuppression: Lower CNI goal due to ASHISH  Significant transplant-related complications: ASHISH    Recommendations were communicated to the primary team verbally.    Anderson Jones MD   Pager: [phone]      Interval History   Plan for iHD today x3h and then discharge. He will need to start dialysis at his new dialysis unit at [facility] starting on Weds.       Review of Systems   4 point ROS was obtained and negative except as noted in the Interval History.    MEDICATIONS:    aspirin  325 mg Per Feeding Tube Daily     FLUoxetine  60 mg Oral At Bedtime     heparin  1.3-2.6 mL Intracatheter Once in dialysis/CRRT     heparin  1.3-2.6 mL Intracatheter Once in dialysis/CRRT     heparin ANTICOAGULANT  5,000 Units Subcutaneous Q12H     insulin aspart   Subcutaneous TID w/meals     insulin glargine  10 Units Subcutaneous QPM     insulin glargine  15 Units Subcutaneous QAM AC     lidocaine  2 patch Transdermal Q24H      lidocaine   Transdermal Q8H GARETH     magnesium oxide  400 mg Oral Q24H     melatonin  5 mg Oral QPM     metoprolol tartrate  25 mg Oral BID     multivitamin RENAL  1 tablet Oral Daily     mycophenolate  750 mg Oral BID IS     nystatin  500,000 Units Swish & Swallow 4x Daily     pantoprazole  40 mg Oral BID     predniSONE  5 mg Oral Daily     sodium chloride (PF)  3 mL Intravenous Q8H     sulfamethoxazole-trimethoprim  1 tablet Oral or Feeding Tube Once per day on      tacrolimus  2.5 mg Oral BID IS     ursodiol  300 mg Oral BID     valGANciclovir  450 mg Oral Once per day on      cholecalciferol  25 mcg Oral or Feeding Tube Daily       dextrose Stopped (23 1544)     dextrose 1,000 mL (03/10/23 0753)       Physical Exam   Temp  Av.6  F (36.4  C)  Min: 92.8  F (33.8  C)  Max: 99.5  F (37.5  C)  Arterial Line BP  Min: 78/56  Max: 173/78  Arterial Line MAP (mmHg)  Av.4 mmHg  Min: 56 mmHg  Max: 190 mmHg      Pulse  Av.4  Min: 62  Max: 112 Resp  Avg: 15.7  Min: 8  Max: 34  FiO2 (%)  Av.5 %  Min: 30 %  Max: 100 %  SpO2  Av.6 %  Min: 84 %  Max: 100 %    CVP (mmHg): 8 mmHgBP 112/78   Pulse 71   Temp 98.7  F (37.1  C) (Oral)   Resp 13   Wt 70.1 kg (154 lb 8.7 oz)   SpO2 96%   BMI 25.72 kg/m     Date 23 07 - 23 0659   Shift 1107-1234 9437-9741 0920-3134 24 Hour Total   INTAKE   I.V. 156   156   NG/GT 80   80   Enteral 160   160   Shift Total(mL/kg) 396(5.04)   396(5.04)   OUTPUT   Shift Total(mL/kg)       Weight (kg) 78.5 78.5 78.5 78.5      Admit Weight: 65.9 kg (145 lb 4.5 oz)     GENERAL APPEARANCE: Pt sleeping at time of visit.  Awakens to voice.  HENT: mouth without ulcers or lesions, NJ in place  RESP: lungs clear to auscultation - no rales, rhonchi or wheezes  CV: regular rhythm, normal rate, no rub, no murmur  EDEMA: No  LE and dependent edema bilaterally  ABDOMEN: soft, nondistended, mild TTP, bowel sounds normal  MS: extremities normal - no gross  deformities noted, no evidence of inflammation in joints, no muscle tenderness  SKIN: no rash  DIALYSIS ACCESS:  Right IJ tunneled catheter     Data   All labs reviewed by me.  CMP  Recent Labs   Lab 03/19/23  0805 03/19/23  0740 03/19/23  0604 03/19/23  0153 03/18/23  0646 03/18/23  0618 03/17/23  0759 03/17/23  0546 03/16/23  0903 03/16/23  0635   NA  --  129*  --   --   --  132*  --  125*  --  128*   POTASSIUM  --  4.9  --   --   --  3.9  --  5.0  --  4.3   CHLORIDE  --  95*  --   --   --  95*  --  90*  --  93*   CO2  --  23  --   --   --  27  --  25  --  25   ANIONGAP  --  11  --   --   --  10  --  10  --  10   * 129* 117* 130*   < > 68*   < > 217*   < > 242*   BUN  --  59.9*  --   --   --  39.6*  --  81.4*  --  54.0*   CR  --  2.58*  --   --   --  1.96*  --  3.18*  --  2.53*   GFRESTIMATED  --  34*  --   --   --  47*  --  26*  --  34*   SARTHAK  --  9.5  --   --   --  9.2  --  9.4  --  9.4   MAG  --  2.3  --   --   --  1.8  --  2.1  --  1.9   PHOS  --  5.5*  --   --   --  4.0  --  4.9*  --  3.5   PROTTOTAL  --  6.0*  --   --   --  5.7*  --  5.3*  --  5.8*   ALBUMIN  --  3.3*  --   --   --  3.4*  --  3.0*  --  3.3*   BILITOTAL  --  1.1  --   --   --  1.0  --  1.0  --  1.1   ALKPHOS  --  269*  --   --   --  259*  --  281*  --  311*   AST  --  22  --   --   --  18  --  16  --  24   ALT  --  10  --   --   --  13  --  14  --  15    < > = values in this interval not displayed.     CBC  Recent Labs   Lab 03/19/23  0740 03/18/23  0618 03/17/23  0546 03/16/23  0635   HGB 9.3* 7.2* 7.4* 8.0*   WBC 8.0 7.1 10.4 10.4   RBC 2.88* 2.24* 2.31* 2.51*   HCT 29.6* 23.3* 23.6* 26.2*   * 104* 102* 104*   MCH 32.3 32.1 32.0 31.9   MCHC 31.4* 30.9* 31.4* 30.5*   RDW 19.0* 19.9* 19.8* 20.1*    274 265 254     INR  No lab results found in last 7 days.  ABG  No lab results found in last 7 days.   Urine Studies  Recent Labs   Lab Test 03/12/23  1134 02/27/23  1616 02/24/23  1818 02/22/23  1421   COLOR Dark Yellow*  Yellow Light Yellow Yellow   APPEARANCE Cloudy* Clear Clear Clear   URINEGLC 30* Negative Negative Negative   URINEBILI Small* Negative Negative Negative   URINEKETONE Negative Negative Negative Negative   SG 1.027 1.012 1.009 1.010   UBLD Moderate* Negative Negative Negative   URINEPH 5.0 5.5 5.0 5.0   PROTEIN 100* 30* Negative Negative   NITRITE Negative Negative Negative Negative   LEUKEST Small* Negative Negative Negative   RBCU 20* <1 <1 1   WBCU 112* 2 1 2     No lab results found.  PTH  No lab results found.  Iron Studies  Recent Labs   Lab Test 02/22/23  0610 02/20/23  0730 02/16/23  0543 12/20/22  0703 10/06/22  0958 04/07/22  0624   IRON 26*  --   --  249*  --  85   *  --   --   --   --   --    IRONSAT 19  --   --   --   --   --    ASCENCION 1,465* 1,335* 1,725* 2,207* 2,042* 423*       IMAGING:  All imaging studies reviewed by me.

## 2023-03-19 NOTE — PHARMACY-TRANSPLANT NOTE
Solid Organ Transplant Recipient Prior to Discharge Note    27 year old male s/p LIVER transplant on 2/28/2023.    Immunosuppression plan at time of discharge:    Mycophenolate 750 mg by mouth twice daily about 12 hours apart    Prednisone 5mg by mouth daily    Tacrolimus 2 mg by mouth twice daily about 12 hours apart, titrated to goal trough of 6-8 mcg/L   Most recent tacrolimus level 3/17/2023 10 mcg/L (11.25 hour trough) after 8 doses of 3mg  po bid; dose was decreased on 3/17 to 2.5mg po bid                    Opportunistic prophylaxis:    Sulfamethoxazole-trimethoprim 400-80mg by mouth three times weekly for PJP prophylaxis (continue for ~ 6 months post transplant)    Valganciclovir 450 mg by mouth twice weekly on Tuesday and Saturdays for CMV prophylaxis (CMV IgG: Donor NEGATIVE  / Recipient NEGATIVE; EBV IgG: Donor POSITIVE / Recipient NEGATIVE ) (continue ~ 3 months post transplant)    Pharmacy has monitored for medication interactions and immunosuppression levels in conjunction with the multidisciplinary team. In anticipation for discharge, medication therapy needs have been addressed daily throughout the current admission via multidisciplinary rounds and/or discussions, order verification, daily clinical pharmacy review, and communication with prescribers.  Kathy Albert, Pharm.D., BCPS, BCTXP  Pager [phone]

## 2023-03-19 NOTE — PROGRESS NOTES
/60   Pulse 69   Temp 98.7  F (37.1  C) (Oral)   Resp (!) 9   Wt 70.1 kg (154 lb 8.7 oz)   SpO2 98%   BMI 25.72 kg/m      Shift: 5882-2055  Isolation Status: NA  VSS on RA, afebrile  Neuro: Aox4  Behaviors: Calm cooperative  B  Labs: Hgb   Respiratory: SOB with exertion  Cardiac: WDL  Pain/Nausea: Pain managed with Oxycodone/Tylenol  PRN: Tylenol  Diet: Regular  IV Access: PIV  GI/: BM 3/19/2023, Oliguric/HD  Skin: No new concerns  Mobility: Independent w/walker  Events/Education: discharge eduction  Plan: Discharge today   Pt is inpatient dialysis today, will obtain a chair time for outpatient dialysis tomorrow at [facility] and start outpatient on Wednesday.   [facility] [phone]

## 2023-03-19 NOTE — PLAN OF CARE
VS: /74 (BP Location: Left arm, Patient Position: Semi-Medrano's, Cuff Size: Adult Regular)   Pulse 72   Temp 98.8  F (37.1  C) (Oral)   Resp 16   Wt 70.1 kg (154 lb 8.7 oz)   SpO2 98%   BMI 25.72 kg/m      Cares: 1900 - 0730    Neuro: Aox4   Cardio: WDL   Respiratory: RA, dyspnea on exertion   GI/: oliguric (pt on HD MWF), LBM 3/18  Skin: clamshell abdominal incision stapled and CHERYL   Diet: Regular (calorie count/carb coverage)  Labs: waiting on morning lab results   BG: Q4H (117 - 144)  LDA:  Right PIV, Right chest dialysis line   Mobility: SBA w/ walker   Pain/Nausea: mild pain and nausea   PRN medications: oxy x1, tylenol x1, robaxin x1, zofran x1, tums x1  Plan of Care: plan for discharge this AM. Continue with current POC and update MD with any changes

## 2023-03-19 NOTE — PROGRESS NOTES
Care Management Follow Up    Length of Stay (days): 36  Expected Discharge Date: 03/20/2023  Concerns to be Addressed: discharge planning     Patient plan of care discussed at interdisciplinary rounds: Yes  Anticipated Discharge Disposition: Home  Anticipated Discharge Services: Home Care  Anticipated Discharge DME: None  Patient/family educated on Medicare website which has current facility and service quality ratings: no  Education Provided on the Discharge Plan:  yes  Patient/Family in Agreement with the Plan: yes      Additional Information:  RNCC notified by transplant PA, Julieth, that pt is medically ready for discharge and only hold up is confirmation of OP HD chair time. RNCC checked [facility] portal and looks like chair time has not been confirmed due to needing new CXR results. CXR results were sent on 3/17/23 but a new CXR was done on 3/17/23. RNCC sent most recent CXR again. RNCC placed call to [facility] admissions to try to confirm chair time but had to leave a message for them to call back. RNCC tried calling [facility] directly but they are closed on Sundays. RNCC will be unable to confirm HD chair time until tomorrow.    RNCC reached back out and spoke with Bethany Clancy plans on dialyzing pt today and then discharging him. RNCC to follow up with [facility] tomorrow to get chair time and pass it along to pt. Weekend RNCC sent reminder to weekday RNCC to follow up on this tomorrow.    Jayden Monson, RN BSN  7C RN Care Coordinator    To contact the weekend RNCC  [facility] (0800 [phone]) Saturday and Sunday    Units: 4A, 4C, 4E, 5A and 5B- Pager 1: [phone]    Units: 6A, 6B, 6C, 6D- Pager 2: [phone]    Units: 7A, 7B, 7C, 7D, and 5C-Pager 3: [phone]     [facility] ([phone]) Saturday and Sunday    Units: 5 Ortho, 8A, 10 ICU, & Pediatric Units-Pager 4: [phone]

## 2023-03-19 NOTE — PROGRESS NOTES
DISCHARGE:  Patient with orders to discharge to Home.     Education Provided:   Med Card yes  Lab Book yes   Specialty Pharmacy was completed with the patient and the more  LDAs PIV was removed has no other drains.     Discharge instructions, medications & follow ups reviewed with patient and his mom. Copy of discharge summary given to patient. PIV removed. [facility] left to [facility].    Patient in stable condition. AVSS. paint had no further questions regarding discharge instructions and medications. Patient transferred out by 1745 & left with all of his belongings.  Plan for follow up labs tomorrow morning.

## 2023-03-19 NOTE — DISCHARGE SUMMARY
Dialysis Discharge Summary Brief    [facility]  Division of Nephrology  Nephrology Discharge Dialysis Orders  Ph: [phone]  Fax: [phone]    Dillon Salter  MRN: [id number]  YOB: 1995    [facility] Dialysis Unit: [facility]  Primary Nephrologist: Dr. Anderson Jones    Date of Admission: 2/11/2023  Date of Discharge: 3/19/23  Discharge Diagnosis: ASHISH after liver transplant       ICD-10-CM    1. Liver replaced by transplant (H)  Z94.4 aspirin (ASA) 325 MG tablet     multivitamin RENAL (RENAVITE RX/NEPHROVITE) 1 MG tablet     predniSONE (DELTASONE) 5 MG tablet     sulfamethoxazole-trimethoprim (BACTRIM) 400-80 MG tablet     ursodiol (ACTIGALL) 300 MG capsule      2. Hematemesis, unspecified whether nausea present  K92.0 Case Request: ESOPHAGOGASTRODUODENOSCOPY (EGD)     Case Request: ESOPHAGOGASTRODUODENOSCOPY (EGD)     CANCELED: Adult GI  Referral - Procedure Only      3. Esophageal varices without bleeding, unspecified esophageal varices type (H)  I85.00 DISCONTINUED: carvedilol (COREG) 6.25 MG tablet      4. Thrombocytopenia (H)  D69.6 CANCELED: Medication Therapy Management Referral      5. Hepatic encephalopathy  K76.82 DISCONTINUED: lidocaine 1 % 0.1-1 mL     DISCONTINUED: lidocaine (LMX4) cream     DISCONTINUED: sodium chloride (PF) 0.9% PF flush 3 mL     DISCONTINUED: sodium chloride (PF) 0.9% PF flush 3 mL     DISCONTINUED: albumin human 5 % injection 50 g     DISCONTINUED: fluconazole (DIFLUCAN) intermittent infusion 400 mg in NaCl     DISCONTINUED: methylPREDNISolone sodium succinate (solu-MEDROL) 1,000 mg in sodium chloride 0.9 % 291 mL intermittent infusion     DISCONTINUED: calcium chloride 4 g in sodium chloride 0.9 % 100 mL intermittent infusion     DISCONTINUED: piperacillin-tazobactam (ZOSYN) 3.375 g vial to attach to  mL bag     DISCONTINUED: piperacillin-tazobactam (ZOSYN) 3.375 g vial to attach to  mL bag      6. Alcoholic  cirrhosis of liver with ascites (H)  K70.31 sodium chloride (PF) 0.9% PF flush 3 mL     sodium chloride (PF) 0.9% PF flush 3 mL     sodium chloride (PF) 0.9% PF flush 3 mL     methylPREDNISolone sodium succinate (solu-MEDROL) 200 mg in sodium chloride 0.9 % 58.2 mL intermittent infusion     methylPREDNISolone sodium succinate (solu-MEDROL) injection 100 mg     methylPREDNISolone sodium succinate (solu-MEDROL) injection 50 mg     predniSONE (DELTASONE) tablet 25 mg     predniSONE (DELTASONE) tablet 10 mg     Case Request: LAPAROTOMY     Case Request: LAPAROTOMY     piperacillin-tazobactam (ZOSYN) 4.5 g vial to attach to  mL bag     valGANciclovir (VALCYTE) tablet 450 mg     tacrolimus (GENERIC EQUIVALENT) capsule 2.5 mg     valGANciclovir (VALCYTE) 450 MG tablet     DISCONTINUED: lidocaine 1 % 0.1-1 mL     DISCONTINUED: lidocaine (LMX4) cream     DISCONTINUED: sodium chloride (PF) 0.9% PF flush 3 mL     DISCONTINUED: sodium chloride (PF) 0.9% PF flush 3 mL     DISCONTINUED: albumin human 5 % injection 50 g     DISCONTINUED: fluconazole (DIFLUCAN) intermittent infusion 400 mg in NaCl     DISCONTINUED: methylPREDNISolone sodium succinate (solu-MEDROL) 1,000 mg in sodium chloride 0.9 % 291 mL intermittent infusion     DISCONTINUED: calcium chloride 4 g in sodium chloride 0.9 % 100 mL intermittent infusion     DISCONTINUED: piperacillin-tazobactam (ZOSYN) 3.375 g vial to attach to  mL bag     DISCONTINUED: piperacillin-tazobactam (ZOSYN) 3.375 g vial to attach to  mL bag     DISCONTINUED: Reason beta blocker order not selected     DISCONTINUED: dextrose 5% and 0.45% NaCl infusion     DISCONTINUED: NaCl 0.45 % 1,000 mL infusion     DISCONTINUED: valGANciclovir (VALCYTE) tablet 450 mg     DISCONTINUED: valGANciclovir (VALCYTE) solution 450 mg     DISCONTINUED: piperacillin-tazobactam (ZOSYN) 3.375 g vial to attach to  mL bag     DISCONTINUED: fluconazole (DIFLUCAN) intermittent infusion 400 mg in  NaCl     DISCONTINUED: mycophenolate (GENERIC EQUIVALENT) capsule 750 mg     DISCONTINUED: mycophenolate (CELLCEPT BRAND) suspension 750 mg     DISCONTINUED: tacrolimus (GENERIC EQUIVALENT) capsule 2 mg     DISCONTINUED: tacrolimus (GENERIC EQUIVALENT) suspension 2 mg     DISCONTINUED: piperacillin-tazobactam (ZOSYN) 2.25 g vial to attach to  ml bag     DISCONTINUED: valGANciclovir (VALCYTE) tablet 450 mg     DISCONTINUED: valGANciclovir (VALCYTE) solution 450 mg     DISCONTINUED: tacrolimus (GENERIC EQUIVALENT) capsule 1 mg     DISCONTINUED: tacrolimus (GENERIC EQUIVALENT) suspension 1 mg     DISCONTINUED: valGANciclovir (VALCYTE) solution 450 mg     DISCONTINUED: mycophenolate (GENERIC EQUIVALENT) capsule 500 mg     DISCONTINUED: mycophenolate (CELLCEPT BRAND) suspension 500 mg     DISCONTINUED: tacrolimus (GENERIC EQUIVALENT) capsule 1.5 mg     DISCONTINUED: tacrolimus (GENERIC EQUIVALENT) suspension 1.5 mg     DISCONTINUED: tacrolimus (GENERIC EQUIVALENT) capsule 2 mg     DISCONTINUED: tacrolimus (GENERIC EQUIVALENT) suspension 2 mg     DISCONTINUED: tacrolimus (GENERIC EQUIVALENT) capsule 4 mg     DISCONTINUED: tacrolimus (GENERIC EQUIVALENT) capsule 3 mg      7. Acute renal failure, unspecified acute renal failure type (H)  N17.9 Vitamin D3 (CHOLECALCIFEROL) 25 mcg (1000 units) tablet      8. Immunosuppressed status (H)  D84.9       9. Anxiety and depression  F41.9 FLUoxetine (PROZAC) 20 MG capsule    F32.A       10. Renovascular hypertension  I15.0 metoprolol tartrate (LOPRESSOR) 25 MG tablet      11. Acute post-operative pain  G89.18 Walker Order for DME - ONLY FOR DME     Cane Order for DME - ONLY FOR DME          [x] New initiation, new dialysis orders will be faxed.      New Orders (if not applicable put NA):  Estimated Dry Weight 69kg   Dialysis Duration 3.5h   Dialysis Access RIJ tunneled line   Antibiotics (dose per dialysis, end date) N/A           Labs to be drawn at dialysis Per protocol    Other major changes to dialysis prescription (e.g. Dialysate bath, heparin, blood flow rate, etc)   Qb 350cc/hr, dialysate flow 600ml/min, 2K, 138Na, 35 bicarb, 2.5Ca   Medication changes (also fax the unit a copy of the discharge summary)         See med list     Name of physician completing this form: Anderson Jones MD   Pager: [phone]

## 2023-03-19 NOTE — PROGRESS NOTES
HEMODIALYSIS TREATMENT NOTE    Date: 3/19/2023  Time: 2:55 PM    Data:  Pre Wt:  70.1   Desired Wt :1-2   kg   Post Wt:  69.5 kg ( estimated )  Weight change: 1.8  kg  Ultrafiltration - Post Run Net Total Removed (mL): 1800mL  Vascular Access Status: patent  Dialyzer Rinse: Light  Total Blood Volume Processed:  1.8 L Liters  Total Dialysis (Treatment) Time: 3 Hours    Lab:   no     Interventions:  3.0 hour tx, 2 K+, Ca++ 2.5   No Heparin  CVC patent, both ports aspirate and flush well, ran at 350-400 blood flow rate with Lines reversed, Pt was hemodynamically stable throughout HD, crit line used for fluid volume monitoring, Rinsed back successfully, see HD flow sheet for all data. Report given to primary RN post dialysis.     Assessment:  Alert and oriented X4, Unlabored respirations, Right non tunneled CVC, both ports aspirate and flush. Pt c/o pain of 7 at end of dialysis, requested for Oxycodone 5 mg, Meds admin, Reported to Tanisha NEWBERRY to reassess pt for med effectiveness.     Plan:    Per renal team

## 2023-03-20 ENCOUNTER — OFFICE VISIT (OUTPATIENT)
Dept: TRANSPLANT | Facility: CLINIC | Age: 28
End: 2023-03-20
Attending: SURGERY
Payer: COMMERCIAL

## 2023-03-20 ENCOUNTER — LAB (OUTPATIENT)
Dept: LAB | Facility: CLINIC | Age: 28
End: 2023-03-20
Payer: COMMERCIAL

## 2023-03-20 ENCOUNTER — PATIENT OUTREACH (OUTPATIENT)
Dept: CARE COORDINATION | Facility: CLINIC | Age: 28
End: 2023-03-20

## 2023-03-20 ENCOUNTER — TELEPHONE (OUTPATIENT)
Dept: TRANSPLANT | Facility: CLINIC | Age: 28
End: 2023-03-20

## 2023-03-20 VITALS
WEIGHT: 160.5 LBS | HEART RATE: 75 BPM | SYSTOLIC BLOOD PRESSURE: 118 MMHG | DIASTOLIC BLOOD PRESSURE: 74 MMHG | BODY MASS INDEX: 26.71 KG/M2 | OXYGEN SATURATION: 100 %

## 2023-03-20 DIAGNOSIS — Z94.4 LIVER REPLACED BY TRANSPLANT (H): ICD-10-CM

## 2023-03-20 LAB
ALBUMIN SERPL BCG-MCNC: 3.7 G/DL (ref 3.5–5.2)
ALP SERPL-CCNC: 277 U/L (ref 40–129)
ALT SERPL W P-5'-P-CCNC: 17 U/L (ref 10–50)
ANION GAP SERPL CALCULATED.3IONS-SCNC: 12 MMOL/L (ref 7–15)
AST SERPL W P-5'-P-CCNC: 33 U/L (ref 10–50)
BILIRUB DIRECT SERPL-MCNC: 0.62 MG/DL (ref 0–0.3)
BILIRUB SERPL-MCNC: 1.2 MG/DL
BUN SERPL-MCNC: 39.1 MG/DL (ref 6–20)
CALCIUM SERPL-MCNC: 9.7 MG/DL (ref 8.6–10)
CHLORIDE SERPL-SCNC: 94 MMOL/L (ref 98–107)
CREAT SERPL-MCNC: 1.86 MG/DL (ref 0.67–1.17)
DEPRECATED HCO3 PLAS-SCNC: 26 MMOL/L (ref 22–29)
ERYTHROCYTE [DISTWIDTH] IN BLOOD BY AUTOMATED COUNT: 18.5 % (ref 10–15)
GFR SERPL CREATININE-BSD FRML MDRD: 50 ML/MIN/1.73M2
GLUCOSE SERPL-MCNC: 90 MG/DL (ref 70–99)
HCT VFR BLD AUTO: 29 % (ref 40–53)
HGB BLD-MCNC: 9.3 G/DL (ref 13.3–17.7)
MAGNESIUM SERPL-MCNC: 1.8 MG/DL (ref 1.7–2.3)
MCH RBC QN AUTO: 32.7 PG (ref 26.5–33)
MCHC RBC AUTO-ENTMCNC: 32.1 G/DL (ref 31.5–36.5)
MCV RBC AUTO: 102 FL (ref 78–100)
PHOSPHATE SERPL-MCNC: 5.1 MG/DL (ref 2.5–4.5)
PLATELET # BLD AUTO: 377 10E3/UL (ref 150–450)
POTASSIUM SERPL-SCNC: 5.4 MMOL/L (ref 3.4–5.3)
PROT SERPL-MCNC: 6.5 G/DL (ref 6.4–8.3)
RBC # BLD AUTO: 2.84 10E6/UL (ref 4.4–5.9)
SODIUM SERPL-SCNC: 132 MMOL/L (ref 136–145)
TACROLIMUS BLD-MCNC: 7.6 UG/L (ref 5–15)
TME LAST DOSE: NORMAL H
TME LAST DOSE: NORMAL H
WBC # BLD AUTO: 7.6 10E3/UL (ref 4–11)

## 2023-03-20 PROCEDURE — 85027 COMPLETE CBC AUTOMATED: CPT

## 2023-03-20 PROCEDURE — 99214 OFFICE O/P EST MOD 30 MIN: CPT | Mod: 24 | Performed by: SURGERY

## 2023-03-20 PROCEDURE — 82248 BILIRUBIN DIRECT: CPT

## 2023-03-20 PROCEDURE — 36415 COLL VENOUS BLD VENIPUNCTURE: CPT

## 2023-03-20 PROCEDURE — 80053 COMPREHEN METABOLIC PANEL: CPT

## 2023-03-20 PROCEDURE — 84100 ASSAY OF PHOSPHORUS: CPT

## 2023-03-20 PROCEDURE — 83735 ASSAY OF MAGNESIUM: CPT

## 2023-03-20 PROCEDURE — 80197 ASSAY OF TACROLIMUS: CPT

## 2023-03-20 NOTE — TELEPHONE ENCOUNTER
Dillon is a recent liver transplant who discharged on 03/19/2023.    The family member (Leticia, mom) will be responsible for managing medications.     Patient will  transplant supplies including 7 day pill organizer amd thermometer at the discharge pharmacy along with medications. (He already has a BP monitor.)     Patient chooses to receive medications from  specialty pharmacy.    **CAN PATIENT FILL AT [facility] FOR MEDICATIONS LISTED? YES     HEALTH BENEFIT: ([facility]) PREPAID MEDICAL ASSISTANCE   ID# [id number] GRP# [id number] (EFFECTIVE (DATE: 2/27/2023) )   (MEDICAID ID# [id number])     PHARMACY BENEFIT: ([facility])  PROCESSING INFO: ID# [id number] [facility]# [id number] PCN# [id number] BIN# [id number] (EFFECTIVE (DATE: 2/27/2023) )   DEDUCTIBLE (0) & MAX OUT-OF-POCKET (0)   COPAY STRUCTURE:  $ (1) FOR GENERIC  $ (3) FOR BRAND  (PA NEEDED) FOR NON-FORMULARY MEDICATIONS      TEST CLAIM SPECIALTY #28  MYCOPHENOLATE 250mg (#240/30DS)=$1  PROGRAF 1mg (#180/30DS)= PRODUCT NOT ON FORMULARY  TACROLIMUS 1mg (#60/30DS)=$1  CYCLOSPORINE 100mg (#60/30DS)=$1  VALGANCICLOVIR 450mg (#60/30DS)=$1  VALACYCLOVIR 1gm (#90=30DS) =$1        Cristopher Brooks, Pharm.D.  [facility]  [phone]

## 2023-03-20 NOTE — PROGRESS NOTES
Care Management Follow Up    Writer notified by weekend RNCC that provider had cleared patient.  Outpatient  HD placement had not been confirmed. Weekend RNCC requesting assistance in confirming outpatient HD placement.    Spoke with Joselyn [facility] # [phone]  Fax # [phone].  Per discussion with Joselyn [facility] still needs dialysis flowsheets.  Writer faxed dialysis flowsheets along with nephrology dialysis orders.   Per chart review noted that outpatient HD placement was requested:    Awaiting confirmation of outpatient dialysis placement.    Reviewed with Rosita ENAMORADO Transplant.      0900: Received confirmation from [facility] that they are able to accept patient wth initial start on Wednesday 3/22.  Spoke with pt Mom via phone as they were in route to [facility] for lab appointment.  Emailed copy of [facility] Placement letter to patients email address [email or web address].      [facility]  [facility]  [address] [place] [address]  Phone: [phone]  Fax: [phone]  Monday, Wednesday, Friday 3:45 p.m. chair time with first outpatient run on 3/22.      Leilani Lockhart, RN BSN, PHN, ACM-RN  7A RN Care Coordinator  Phone: [phone]  Pager [phone]    To contact the weekend RNCC  [place] (0800 - 1630) Saturday and Sunday    Units: 4A, 4C, 4E, 5A and 5B- Pager 1: [phone]    Units: 6A, 6B, 6C, 6D- Pager 2: [phone]    Units: 7A, 7B, 7C, 7D, and 5C-Pager 3: [phone]    [facility] ([phone]) Saturday and Sunday    Units: 5 Ortho, 8A, 10 ICU, & Pediatric Units-Pager 4: [phone]    3/20/2023 8:40 AM

## 2023-03-20 NOTE — PROGRESS NOTES
[facility]    Background: Transitional Care Management program auto-identified and prompting a chart review by [facility] team.    Assessment: Upon chart review, [facility] Team member will Enroll this episode of Transitional Care Management program due to reason below:    Upon chart review, patient is followed by [facility] Transplant team who follow their patients closely. [facility] will not conduct outreach to avoid duplication of outreach to patient.     Plan: Transitional Care Management episode enrolled per reason above.      *Connected Care Resource Team does NOT follow patient ongoing. Referrals are identified based on internal discharge reports and the outreach is to ensure patient has an understanding of their discharge instructions.

## 2023-03-20 NOTE — PLAN OF CARE
Occupational Therapy and Lymphedema Therapy Discharge Summary    Reason for therapy discharge:    Discharged to home.    Progress towards therapy goal(s). See goals on Care Plan in [facility] electronic health record for goal details.  Goals partially met.  Barriers to achieving goals:   discharge from facility.    Therapy recommendation(s):    No further therapy is recommended.  Continue to wear comperrm size F as able on BLE to continue to manage BLE edema. Pt may progress to comperm size E pending edema control.

## 2023-03-20 NOTE — TELEPHONE ENCOUNTER
"\"Dillon Salter Laura K RN  Phone Number: [phone]     Emeterio Perry. sorry to bother you again so quickly.   On Monday can we go over the upcoming appts? I don't want to miss anything.   Tho, I'm somewhat confused also.   Thanks again,   Leticia\"    Called Leticia to go over Dillon's schedule, no response- left VM to return call.  "

## 2023-03-20 NOTE — LETTER
3/20/2023         RE: Dillon Salter  [address] [place] [address]        Dear Colleague,    Thank you for referring your patient, Dillon Salter, to the [facility]. Please see a copy of my visit note below.    Transplant Surgery Progress Note    Transplants:  2/28/2023 (Liver); Postoperative day:  20  S: Doing well post-operatively. no fevers, chills. No nausea/vomiting. Pain at incision site with movement only, tolerated with lidocaine patches. No urinary sx - starting to make some urine. Tolerating full diet. Having regular BM's.   Transplant History:    Transplant Type:  Liver Tx  Donor Type:    Transplant Date:  2/28/2023 (Liver)     Acute Rejection Hx:  No    Present Maintenance Immunosuppression:  Tacrolimus and Mycophenolate mofetil    CMV IgG Ab Discordance:  No  EBV IgG Ab Discordance:  Yes    BK Viremia: n/a  EBV Viremia:  No    Transplant Coordinator: Vicky Castro     Transplant Office Phone Number: [phone]     Immunosuppressant Medications     Immunosuppressive Agents Disp Start End     mycophenolate (GENERIC EQUIVALENT) 250 MG capsule    180 capsule 3/19/2023     Sig - Route: Take 3 capsules (750 mg) by mouth 2 times daily - Oral    Class: E-Prescribe     tacrolimus (GENERIC EQUIVALENT) 0.5 MG capsule    60 capsule 3/19/2023     Sig - Route: Take 1 capsule (0.5 mg) by mouth 2 times daily Take 1 cap by mouth twice daily as directed by Transplant Center for dose changes. - Oral    Class: E-Prescribe     tacrolimus (GENERIC EQUIVALENT) 1 MG capsule    120 capsule 3/19/2023     Sig - Route: Take 2 capsules (2 mg) by mouth 2 times daily - Oral    Class: E-Prescribe    Renewals     Renewal requests to authorizing provider (Julieth Leblanc NP) <b>prohibited</b>                Possible Immunosuppression-related side effects:   []             headache  []             vivid dreams  []             irritability  []             cognitive difficuties  []              fine tremor  []             nausea  []             diarrhea  []             neuropathy      []             edema  []             renal calcineurin toxicity  []             hyperkalemia  []             post-transplant diabetes  []             decreased appetite  []             increased appetite  []             other:  []             none    Prescription Medications as of 3/20/2023       Rx Number Disp Refills Start End Last Dispensed Date Next Fill Date Owning Pharmacy    acetaminophen (TYLENOL) 325 MG tablet  100 tablet 0 3/19/2023    [address]    Si tablets (650 mg) by Oral or Feeding Tube route every 8 hours as needed for mild pain or fever    Class: E-Prescribe    Route: Oral or Feeding Tube    Renewals     Renewal requests to authorizing provider (Julieth Leblanc NP) <b>prohibited</b>          aspirin (ASA) 325 MG tablet  30 tablet 2 3/18/2023 2023   [address]    Si tablet (325 mg) by Per Feeding Tube route daily for 30 days    Class: E-Prescribe    Route: Per Feeding Tube    Renewals     Renewal requests to authorizing provider (Julieth Leblanc NP) <b>prohibited</b>          FLUoxetine (PROZAC) 20 MG capsule  90 capsule 0 3/17/2023    [address]    Sig: Take 3 capsules (60 mg) by mouth At Bedtime    Class: E-Prescribe    Route: Oral    Renewals     Renewal requests to authorizing provider (Julieth Leblanc NP) <b>prohibited</b>          insulin aspart (NOVOLOG FLEXPEN) 100 UNIT/ML pen  15 mL 3 2022    [id number] [place] [address] [address] [facility]    Si unit per 10 grams of carb, plus additional units based on following scale   For Pre-Meal  - 164 give 1 unit. For Pre-Meal  - 189 give 2 units. For Pre-Meal  - 214 give 3 units.  For Pre-Meal  - 239 give 4 units. For Pre-Meal  - 264 give 5 units. For Pre-Meal  - 289 give 6 units. For Pre-Meal  - 314 give 7 units. For Pre-Meal  - 339 give 8 units. For Pre-Meal  - 364 give 9 units.  For Pre-Meal BG greater than or equal to 365 give 10 units    Class: No Print Out    insulin glargine (LANTUS PEN) 100 UNIT/ML pen  15 mL 0 3/19/2023    [address]    Sig: Inject 15 Units Subcutaneous every morning AND 10 Units At Bedtime.    Class: E-Prescribe    Notes to Pharmacy: If Lantus is not covered by insurance, may substitute Basaglar or Semglee or other insulin glargine product per insurance preference at same dose and frequency.      Route: Subcutaneous    Renewals     Renewal requests to authorizing provider (Julieth Leblanc NP) <b>prohibited</b>          Lidocaine (LIDOCARE) 4 % Patch  15 patch 0 3/19/2023    [address]    Sig: Place 2 patches onto the skin every 24 hours To prevent lidocaine toxicity, patient should be patch free for 12 hrs daily.    Class: E-Prescribe    Route: Transdermal    magnesium oxide (MAG-OX) 400 MG tablet  30 tablet 0 3/19/2023    [address]    Sig: Take 1 tablet (400 mg) by mouth every 24 hours    Class: E-Prescribe    Route: Oral    Renewals     Renewal requests to authorizing provider (Julieth Leblanc NP) <b>prohibited</b>          methocarbamol (ROBAXIN) 500 MG tablet  15 tablet 0 3/19/2023    [address]    Sig: Take 1 tablet (500 mg) by mouth 3 times daily as needed for muscle spasms    Class: E-Prescribe    Route: Oral    metoprolol tartrate (LOPRESSOR) 25 MG tablet  60 tablet 1 3/17/2023    [address]    Sig: Take 1 tablet (25  mg) by mouth 2 times daily    Class: E-Prescribe    Route: Oral    Renewals     Renewal requests to authorizing provider (Julieth Leblanc NP) <b>prohibited</b>          Multiple Vitamins-Minerals (MULTIVITAMIN GUMMIES ADULT) CHEW            Sig: Take 2 each by mouth daily    Class: Historical    Route: Oral    multivitamin RENAL (RENAVITE RX/NEPHROVITE) 1 MG tablet  30 tablet 1 3/18/2023    [address]    Sig: Take 1 tablet by mouth daily    Class: E-Prescribe    Route: Oral    mycophenolate (GENERIC EQUIVALENT) 250 MG capsule  180 capsule 1 3/19/2023    [address]    Sig: Take 3 capsules (750 mg) by mouth 2 times daily    Class: E-Prescribe    Route: Oral    ondansetron (ZOFRAN ODT) 4 MG ODT tab  10 tablet 0 3/19/2023    [address]    Sig: Take 1 tablet (4 mg) by mouth every 6 hours as needed for nausea or vomiting    Class: E-Prescribe    Route: Oral    Renewals     Renewal requests to authorizing provider (Julieth Leblanc NP) <b>prohibited</b>          pantoprazole (PROTONIX) 40 MG EC tablet  60 tablet 0 3/19/2023    [address]    Sig: Take 1 tablet (40 mg) by mouth 2 times daily    Class: E-Prescribe    Route: Oral    Renewals     Renewal requests to authorizing provider (Julieth Leblanc NP) <b>prohibited</b>          predniSONE (DELTASONE) 5 MG tablet  30 tablet 0 3/18/2023    [address]    Sig: Take 1 tablet (5 mg) by mouth daily    Class: E-Prescribe    Route: Oral    Renewals     Renewal requests to authorizing provider (Julieth Leblanc NP) <b>prohibited</b>          sulfamethoxazole-trimethoprim (BACTRIM) 400-80 MG tablet  30 tablet 1 3/17/2023    [facility]  [address]    Si tablet by Oral or Feeding Tube route three times a week Increase as instructed per transplant team    Class: E-Prescribe    Route: Oral or Feeding Tube    Renewals     Renewal requests to authorizing provider (Julieth Leblanc NP) <b>prohibited</b>          tacrolimus (GENERIC EQUIVALENT) 0.5 MG capsule  60 capsule 0 3/19/2023    [address]    Sig: Take 1 capsule (0.5 mg) by mouth 2 times daily Take 1 cap by mouth twice daily as directed by Transplant Center for dose changes.    Class: E-Prescribe    Route: Oral    tacrolimus (GENERIC EQUIVALENT) 1 MG capsule  120 capsule 1 3/19/2023    [address]    Sig: Take 2 capsules (2 mg) by mouth 2 times daily    Class: E-Prescribe    Route: Oral    Renewals     Renewal requests to authorizing provider (Julieth Leblanc NP) <b>prohibited</b>          thiamine (B-1) 100 MG tablet  30 tablet 0 2022    [facility] #[id number] [address]  AT [facility]    Sig: Take 1 tablet (100 mg) by mouth in the morning.    Class: E-Prescribe    Route: Oral    ursodiol (ACTIGALL) 300 MG capsule  60 capsule 1 3/17/2023    [address]    Sig: Take 1 capsule (300 mg) by mouth 2 times daily    Class: E-Prescribe    Route: Oral    Renewals     Renewal requests to authorizing provider (Julieth Leblanc NP) <b>prohibited</b>          valGANciclovir (VALCYTE) 450 MG tablet  30 tablet 2 3/20/2023    [address]    Sig: Take 1 tablet (450 mg) by mouth twice a week Increase to 450mg daily when directed by transplant team with improving renal function    Class: E-Prescribe    Notes to Pharmacy: Take Tuesday and Saturday    Route: Oral    Vitamin D3 (CHOLECALCIFEROL) 25 mcg (1000  units) tablet  30 tablet 1 3/18/2023    [facility] - [place], [place] - [address] [facility]    Si tablet (25 mcg) by Oral or Feeding Tube route daily    Class: E-Prescribe    Route: Oral or Feeding Tube          O:   Temp:  [98.3  F (36.8  C)] 98.3  F (36.8  C)  Pulse:  [68-71] 71  Resp:  [11-15] 15  BP: ()/(53-82) 124/82  SpO2:  [98 %-100 %] 100 %  General Appearance: in no apparent distress.   Skin: Normal, no rashes or jaundice  Lungs: easy respirations, no audible wheezing.  Abdomen: soft, nontender, nondistended, scabbing throughout abdomen, incision healing well, no drainage  Extremities: Tremor present bilateral.     Transplant Immunosuppression Labs Latest Ref Rng & Units 3/20/2023 3/19/2023 3/18/2023 3/17/2023 3/16/2023   Creat 0.67 - 1.17 mg/dL - 2.58(H) 1.96(H) 3.18(H) 2.53(H)   UREA NITROGEN 8 - 22 mg/dL - - - - -   UREA NITROGEN (R) 6.0 - 20.0 mg/dL - 59.9(H) 39.6(H) 81.4(H) 54.0(H)   WBC 4.0 - 11.0 10e3/uL 7.6 8.0 7.1 10.4 10.4   Neutrophil % - - - - -   ANEU 1.6 - 8.3 10e3/uL - - - - -       Chemistries:   Recent Labs   Lab Test 23  0805 23  0740   BUN  --  59.9*   CR  --  2.58*   GFRESTIMATED  --  34*   * 129*     Lab Results   Component Value Date    A1C 5.2 2023     Recent Labs   Lab Test 23  0740   ALBUMIN 3.3*   BILITOTAL 1.1   ALKPHOS 269*   AST 22   ALT 10     Urine Studies:  Recent Labs   Lab Test 23  1134   COLOR Dark Yellow*   APPEARANCE Cloudy*   URINEGLC 30*   URINEBILI Small*   URINEKETONE Negative   SG 1.027   UBLD Moderate*   URINEPH 5.0   PROTEIN 100*   NITRITE Negative   LEUKEST Small*   RBCU 20*   WBCU 112*     No lab results found.  Hematology:   Recent Labs   Lab Test 23  1001 23  0740 23  0618   HGB 9.3* 9.3* 7.2*    361 274   WBC 7.6 8.0 7.1     Coags:   Recent Labs   Lab Test 03/10/23  0603 23  0134   INR 1.11 1.19*     HLA antibodies:   No results found for: [id number], [id number],  [id number], [id number]    Assessment: Dillon Salter is doing well s/p Liver Tx:  Issues we addressed during his visit include: pain control, staple removal ?'s    Plan:    1. Graft function: pending, labs not yet back this afternoon  2. Immunosuppression Management: No change to IS .  Complexity of management:Medium.  Contributing factors: recent transplant  3. Required takeback to OR on 3/1: plan for staple removal at next appointment  Followup: 1 week, repeat labs on Thursday    Total Time: 15 min,   Counselling Time: 15 min.    Joshua Cox MD

## 2023-03-20 NOTE — TELEPHONE ENCOUNTER
"Spoke to Leticia. She is concerned that Dillon was not sent home with any Oxycodone for pain relief. Writer received a message today stating that they did not want Dillon to be sent home with narcotics d/t concerns with oversedation when patient was on the medication in the hospital and the fact that they thought he did not need it. I informed Leticia of what inpatient team said, she states the oversedation in the hospital was d/t \"the muscle relaxer\".     Encouraged Leticia to use Tylenol, Robaxin & Lidocaine patches. Dillon has appt at [facility] [place] today at 2:30 PM, will bring up issue at clinic appt today.     Reviewed upcoming appts, no questions at this time. HD confirmed for Wednesday.  " ML-PROCEDURAL ANTICOAGULATION  MANAGEMENT    Assessment     Warfarin interruption plan for Vein procedure on 2021.    A. Fib      Risk stratification for thromboembolism: moderate (2017 ACC periprocedure pathway for NVAF Expert Consensus)      [id number] = 5 (HTN, Age +, DM, vasc DX, female)    NVAF: 2017 ACC periprocedure pathway for NVAF advises likely NO bridge for moderate risk stratification ([id number]-FMCw score 5-6)  without a hx of stroke, TIA or systemic embolism    Plan       Pre-Procedure:    Hold warfarin until after procedure startin2021        Post-Procedure:    Resume home warfarin dose if okay with provider doing procedure on night of procedure, 2021 PM: 5mg    Recheck INR ~5-7 days after resuming warfarin   ?   Kiera Casanova KM    Subjective/Objective:      Selvin Rockwell, a 72 year old female    Reason for Anticoagulation: A. Fib    Goal INR Range: 2.0-3.0    Patient bridged in past: No    Pertinent History: N/A    Wt Readings from Last 3 Encounters:   20 89.8 kg (198 lb)   10/21/20 92.1 kg (203 lb)   20 89.8 kg (198 lb)        Patient must be at least 60 in tall to calculate ideal body weight     Lab Results   Component Value Date    INR 2.40 (H) 2021    INR 2.40 (H) 2020    INR 4.80 (H) 2020     Lab Results   Component Value Date    HGB 7.1 2020    HCT 27.2 2020     2020     Lab Results   Component Value Date    CR 1.04 2020    CR 0.90 2019    CR 0.87 2018     CrCl cannot be calculated (Patient's most recent lab result is older than the maximum 10 days allowed.).

## 2023-03-20 NOTE — PROGRESS NOTES
Transplant Surgery Progress Note    Transplants:  2/28/2023 (Liver); Postoperative day:  20  S: Doing well post-operatively. no fevers, chills. No nausea/vomiting. Pain at incision site with movement only, tolerated with lidocaine patches. No urinary sx - starting to make some urine. Tolerating full diet. Having regular BM's.   Transplant History:    Transplant Type:  Liver Tx  Donor Type:    Transplant Date:  2/28/2023 (Liver)     Acute Rejection Hx:  No    Present Maintenance Immunosuppression:  Tacrolimus and Mycophenolate mofetil    CMV IgG Ab Discordance:  No  EBV IgG Ab Discordance:  Yes    BK Viremia: n/a  EBV Viremia:  No    Transplant Coordinator: Vicky Castro     Transplant Office Phone Number: [phone]     Immunosuppressant Medications     Immunosuppressive Agents Disp Start End     mycophenolate (GENERIC EQUIVALENT) 250 MG capsule    180 capsule 3/19/2023     Sig - Route: Take 3 capsules (750 mg) by mouth 2 times daily - Oral    Class: E-Prescribe     tacrolimus (GENERIC EQUIVALENT) 0.5 MG capsule    60 capsule 3/19/2023     Sig - Route: Take 1 capsule (0.5 mg) by mouth 2 times daily Take 1 cap by mouth twice daily as directed by Transplant Center for dose changes. - Oral    Class: E-Prescribe     tacrolimus (GENERIC EQUIVALENT) 1 MG capsule    120 capsule 3/19/2023     Sig - Route: Take 2 capsules (2 mg) by mouth 2 times daily - Oral    Class: E-Prescribe    Renewals     Renewal requests to authorizing provider (Julieth Leblanc NP) <b>prohibited</b>                Possible Immunosuppression-related side effects:   []             headache  []             vivid dreams  []             irritability  []             cognitive difficuties  []             fine tremor  []             nausea  []             diarrhea  []             neuropathy      []             edema  []             renal calcineurin toxicity  []             hyperkalemia  []             post-transplant diabetes  []              decreased appetite  []             increased appetite  []             other:  []             none    Prescription Medications as of 3/20/2023       Rx Number Disp Refills Start End Last Dispensed Date Next Fill Date Owning Pharmacy    acetaminophen (TYLENOL) 325 MG tablet  100 tablet 0 3/19/2023    [address]    Si tablets (650 mg) by Oral or Feeding Tube route every 8 hours as needed for mild pain or fever    Class: E-Prescribe    Route: Oral or Feeding Tube    Renewals     Renewal requests to authorizing provider (Julieth Leblanc NP) <b>prohibited</b>          aspirin (ASA) 325 MG tablet  30 tablet 2 3/18/2023 2023   [address]    Si tablet (325 mg) by Per Feeding Tube route daily for 30 days    Class: E-Prescribe    Route: Per Feeding Tube    Renewals     Renewal requests to authorizing provider (Julieth Leblanc NP) <b>prohibited</b>          FLUoxetine (PROZAC) 20 MG capsule  90 capsule 0 3/17/2023    [address]    Sig: Take 3 capsules (60 mg) by mouth At Bedtime    Class: E-Prescribe    Route: Oral    Renewals     Renewal requests to authorizing provider (Julieth Leblanc NP) <b>prohibited</b>          insulin aspart (NOVOLOG FLEXPEN) 100 UNIT/ML pen  15 mL 3 2022    [id number] [address]  AT [facility]    Si unit per 10 grams of carb, plus additional units based on following scale   For Pre-Meal  - 164 give 1 unit. For Pre-Meal  - 189 give 2 units. For Pre-Meal  - 214 give 3 units. For Pre-Meal  - 239 give 4 units. For Pre-Meal  - 264 give 5 units. For Pre-Meal  - 289 give 6 units. For Pre-Meal  - 314 give 7 units. For Pre-Meal  - 339 give 8 units. For Pre-Meal  - 364 give 9 units.  For  Pre-Meal BG greater than or equal to 365 give 10 units    Class: No Print Out    insulin glargine (LANTUS PEN) 100 UNIT/ML pen  15 mL 0 3/19/2023    [address]    Sig: Inject 15 Units Subcutaneous every morning AND 10 Units At Bedtime.    Class: E-Prescribe    Notes to Pharmacy: If Lantus is not covered by insurance, may substitute Basaglar or Semglee or other insulin glargine product per insurance preference at same dose and frequency.      Route: Subcutaneous    Renewals     Renewal requests to authorizing provider (Julieth Leblanc NP) <b>prohibited</b>          Lidocaine (LIDOCARE) 4 % Patch  15 patch 0 3/19/2023    [address]    Sig: Place 2 patches onto the skin every 24 hours To prevent lidocaine toxicity, patient should be patch free for 12 hrs daily.    Class: E-Prescribe    Route: Transdermal    magnesium oxide (MAG-OX) 400 MG tablet  30 tablet 0 3/19/2023    [address]    Sig: Take 1 tablet (400 mg) by mouth every 24 hours    Class: E-Prescribe    Route: Oral    Renewals     Renewal requests to authorizing provider (Julieth Leblanc NP) <b>prohibited</b>          methocarbamol (ROBAXIN) 500 MG tablet  15 tablet 0 3/19/2023    [address]    Sig: Take 1 tablet (500 mg) by mouth 3 times daily as needed for muscle spasms    Class: E-Prescribe    Route: Oral    metoprolol tartrate (LOPRESSOR) 25 MG tablet  60 tablet 1 3/17/2023    [address]    Sig: Take 1 tablet (25 mg) by mouth 2 times daily    Class: E-Prescribe    Route: Oral    Renewals     Renewal requests to authorizing provider (Julieth Leblanc NP) <b>prohibited</b>          Multiple Vitamins-Minerals (MULTIVITAMIN GUMMIES ADULT) CHEW             Sig: Take 2 each by mouth daily    Class: Historical    Route: Oral    multivitamin RENAL (RENAVITE RX/NEPHROVITE) 1 MG tablet  30 tablet 1 3/18/2023    [address]    Sig: Take 1 tablet by mouth daily    Class: E-Prescribe    Route: Oral    mycophenolate (GENERIC EQUIVALENT) 250 MG capsule  180 capsule 1 3/19/2023    [address]    Sig: Take 3 capsules (750 mg) by mouth 2 times daily    Class: E-Prescribe    Route: Oral    ondansetron (ZOFRAN ODT) 4 MG ODT tab  10 tablet 0 3/19/2023    [address]    Sig: Take 1 tablet (4 mg) by mouth every 6 hours as needed for nausea or vomiting    Class: E-Prescribe    Route: Oral    Renewals     Renewal requests to authorizing provider (Julieth Leblanc NP) <b>prohibited</b>          pantoprazole (PROTONIX) 40 MG EC tablet  60 tablet 0 3/19/2023    [address]    Sig: Take 1 tablet (40 mg) by mouth 2 times daily    Class: E-Prescribe    Route: Oral    Renewals     Renewal requests to authorizing provider (Julieth Leblanc NP) <b>prohibited</b>          predniSONE (DELTASONE) 5 MG tablet  30 tablet 0 3/18/2023    [address]    Sig: Take 1 tablet (5 mg) by mouth daily    Class: E-Prescribe    Route: Oral    Renewals     Renewal requests to authorizing provider (Julieth Leblanc NP) <b>prohibited</b>          sulfamethoxazole-trimethoprim (BACTRIM) 400-80 MG tablet  30 tablet 1 3/17/2023    [address]    Si tablet by Oral or Feeding Tube route three times a week Increase as instructed per transplant team    Class: E-Prescribe    Route: Oral or Feeding Tube    Renewals     Renewal requests to authorizing provider  (Julieth Leblanc NP) <b>prohibited</b>          tacrolimus (GENERIC EQUIVALENT) 0.5 MG capsule  60 capsule 0 3/19/2023    [address]    Sig: Take 1 capsule (0.5 mg) by mouth 2 times daily Take 1 cap by mouth twice daily as directed by Transplant Center for dose changes.    Class: E-Prescribe    Route: Oral    tacrolimus (GENERIC EQUIVALENT) 1 MG capsule  120 capsule 1 3/19/2023    [address]    Sig: Take 2 capsules (2 mg) by mouth 2 times daily    Class: E-Prescribe    Route: Oral    Renewals     Renewal requests to authorizing provider (Julieth Leblanc NP) <b>prohibited</b>          thiamine (B-1) 100 MG tablet  30 tablet 0 2022    [id number] [address]  AT [facility]    Sig: Take 1 tablet (100 mg) by mouth in the morning.    Class: E-Prescribe    Route: Oral    ursodiol (ACTIGALL) 300 MG capsule  60 capsule 1 3/17/2023    [address]    Sig: Take 1 capsule (300 mg) by mouth 2 times daily    Class: E-Prescribe    Route: Oral    Renewals     Renewal requests to authorizing provider (Julieth Leblanc NP) <b>prohibited</b>          valGANciclovir (VALCYTE) 450 MG tablet  30 tablet 2 3/20/2023    [address]    Sig: Take 1 tablet (450 mg) by mouth twice a week Increase to 450mg daily when directed by transplant team with improving renal function    Class: E-Prescribe    Notes to Pharmacy: Take Tuesday and Saturday    Route: Oral    Vitamin D3 (CHOLECALCIFEROL) 25 mcg (1000 units) tablet  30 tablet 1 3/18/2023    [address]    Si tablet (25 mcg) by Oral or Feeding Tube route daily    Class: E-Prescribe    Route: Oral or Feeding Tube          O:    Temp:  [98.3  F (36.8  C)] 98.3  F (36.8  C)  Pulse:  [68-71] 71  Resp:  [11-15] 15  BP: ()/(53-82) 124/82  SpO2:  [98 %-100 %] 100 %  General Appearance: in no apparent distress.   Skin: Normal, no rashes or jaundice  Lungs: easy respirations, no audible wheezing.  Abdomen: soft, nontender, nondistended, scabbing throughout abdomen, incision healing well, no drainage  Extremities: Tremor present bilateral.     Transplant Immunosuppression Labs Latest Ref Rng & Units 3/20/2023 3/19/2023 3/18/2023 3/17/2023 3/16/2023   Creat 0.67 - 1.17 mg/dL - 2.58(H) 1.96(H) 3.18(H) 2.53(H)   UREA NITROGEN 8 - 22 mg/dL - - - - -   UREA NITROGEN (R) 6.0 - 20.0 mg/dL - 59.9(H) 39.6(H) 81.4(H) 54.0(H)   WBC 4.0 - 11.0 10e3/uL 7.6 8.0 7.1 10.4 10.4   Neutrophil % - - - - -   ANEU 1.6 - 8.3 10e3/uL - - - - -       Chemistries:   Recent Labs   Lab Test 03/19/23  0805 03/19/23  0740   BUN  --  59.9*   CR  --  2.58*   GFRESTIMATED  --  34*   * 129*     Lab Results   Component Value Date    A1C 5.2 03/12/2023     Recent Labs   Lab Test 03/19/23  0740   ALBUMIN 3.3*   BILITOTAL 1.1   ALKPHOS 269*   AST 22   ALT 10     Urine Studies:  Recent Labs   Lab Test 03/12/23  1134   COLOR Dark Yellow*   APPEARANCE Cloudy*   URINEGLC 30*   URINEBILI Small*   URINEKETONE Negative   SG 1.027   UBLD Moderate*   URINEPH 5.0   PROTEIN 100*   NITRITE Negative   LEUKEST Small*   RBCU 20*   WBCU 112*     No lab results found.  Hematology:   Recent Labs   Lab Test 03/20/23  1001 03/19/23  0740 03/18/23  0618   HGB 9.3* 9.3* 7.2*    361 274   WBC 7.6 8.0 7.1     Coags:   Recent Labs   Lab Test 03/10/23  0603 03/09/23  0134   INR 1.11 1.19*     HLA antibodies:   No results found for: IV4LPTFGH, [id number], JK4OEHLXS, [id number]    Assessment: Dillon Salter is doing well s/p Liver Tx:  Issues we addressed during his visit include: pain control, staple removal ?'s    Plan:    1. Graft function: pending, labs not yet back this afternoon  2.  Immunosuppression Management: No change to IS .  Complexity of management:Medium.  Contributing factors: recent transplant  3. Required takeback to OR on 3/1: plan for staple removal at next appointment  Followup: 1 week, repeat labs on Thursday    Total Time: 15 min,   Counselling Time: 15 min.    Joshua Cox MD

## 2023-03-21 ENCOUNTER — TELEPHONE (OUTPATIENT)
Dept: TRANSPLANT | Facility: CLINIC | Age: 28
End: 2023-03-21
Payer: COMMERCIAL

## 2023-03-21 NOTE — TELEPHONE ENCOUNTER
Called Dillon to check in and review labs from yesterday. No answer, LVM to return call.    ADDENDUM:  Spoke to 7A RN, patient admitted for HD and workup concerning for a lesion in his lung cavity.

## 2023-03-21 NOTE — PROGRESS NOTES
[facility]      OUTPATIENT PHYSICAL THERAPY EVALUATION  PLAN OF TREATMENT FOR OUTPATIENT REHABILITATION  (COMPLETE FOR INITIAL CLAIMS ONLY)  Patient's Last Name, First Name, M.I.  YOB: 1995  Dillon Salter                        Provider's Name  [facility] Medical Record No.  [id number]                               Onset Date:  03/01/23   Start of Care Date:  03/07/23      Type:     _X_PT   ___OT   ___SLP Medical Diagnosis:  liver replaced by transplant                        PT Diagnosis:  impaired mobility   Visits from SOC:  1   _________________________________________________________________________________  Plan of Treatment/Functional Goals    Planned Interventions: gait training, patient/family education, home exercise program, stair training, strengthening, transfer training, balance training, neuromuscular re-education, bed mobility training, ROM (range of motion), postural re-education, stretching, progressive activity/exercise, risk factor education, home program guidelines     Goals: See Physical Therapy Goals on Care Plan in [employer] [facility] electronic health record.    Therapy Frequency: Daily  Predicted Duration of Therapy Intervention: 03/21/23  _________________________________________________________________________________    I CERTIFY THE NEED FOR THESE SERVICES FURNISHED UNDER        THIS PLAN OF TREATMENT AND WHILE UNDER MY CARE     (Physician co-signature of this document indicates review and certification of the therapy plan).              Certification date from: 03/07/23, Certification date to: 03/21/23    Referring Physician: Rosita Cohn PA-C            Initial Assessment        See Physical Therapy evaluation dated 03/07/23 in Epic electronic health record.

## 2023-03-22 ENCOUNTER — TELEPHONE (OUTPATIENT)
Dept: TRANSPLANT | Facility: CLINIC | Age: 28
End: 2023-03-22

## 2023-03-22 ENCOUNTER — APPOINTMENT (OUTPATIENT)
Dept: CT IMAGING | Facility: CLINIC | Age: 28
DRG: 682 | End: 2023-03-22
Attending: PHYSICIAN ASSISTANT
Payer: COMMERCIAL

## 2023-03-22 ENCOUNTER — HOSPITAL ENCOUNTER (INPATIENT)
Facility: CLINIC | Age: 28
LOS: 1 days | Discharge: HOME | DRG: 682 | End: 2023-03-22
Attending: TRANSPLANT SURGERY | Admitting: TRANSPLANT SURGERY
Payer: COMMERCIAL

## 2023-03-22 VITALS
DIASTOLIC BLOOD PRESSURE: 74 MMHG | TEMPERATURE: 98.3 F | RESPIRATION RATE: 15 BRPM | HEIGHT: 65 IN | OXYGEN SATURATION: 100 % | SYSTOLIC BLOOD PRESSURE: 121 MMHG | WEIGHT: 160.7 LBS | HEART RATE: 72 BPM | BODY MASS INDEX: 26.77 KG/M2

## 2023-03-22 DIAGNOSIS — E87.5 HYPERKALEMIA: Primary | ICD-10-CM

## 2023-03-22 PROBLEM — J98.4 CAVITARY LESION OF LUNG: Status: ACTIVE | Noted: 2023-03-22

## 2023-03-22 LAB
ALBUMIN SERPL BCG-MCNC: 3.7 G/DL (ref 3.5–5.2)
ALBUMIN UR-MCNC: 10 MG/DL
ALP SERPL-CCNC: 265 U/L (ref 40–129)
ALT SERPL W P-5'-P-CCNC: 10 U/L (ref 10–50)
ANION GAP SERPL CALCULATED.3IONS-SCNC: 11 MMOL/L (ref 7–15)
APPEARANCE UR: CLEAR
AST SERPL W P-5'-P-CCNC: 21 U/L (ref 10–50)
BASOPHILS # BLD AUTO: 0.1 10E3/UL (ref 0–0.2)
BASOPHILS NFR BLD AUTO: 1 %
BILIRUB DIRECT SERPL-MCNC: 0.61 MG/DL (ref 0–0.3)
BILIRUB SERPL-MCNC: 1.1 MG/DL
BILIRUB UR QL STRIP: NEGATIVE
BUN SERPL-MCNC: 60.2 MG/DL (ref 6–20)
CALCIUM SERPL-MCNC: 9.9 MG/DL (ref 8.6–10)
CHLORIDE SERPL-SCNC: 96 MMOL/L (ref 98–107)
COLOR UR AUTO: YELLOW
CREAT SERPL-MCNC: 1.76 MG/DL (ref 0.67–1.17)
DEPRECATED HCO3 PLAS-SCNC: 26 MMOL/L (ref 22–29)
EOSINOPHIL # BLD AUTO: 0.1 10E3/UL (ref 0–0.7)
EOSINOPHIL NFR BLD AUTO: 1 %
ERYTHROCYTE [DISTWIDTH] IN BLOOD BY AUTOMATED COUNT: 18.4 % (ref 10–15)
GFR SERPL CREATININE-BSD FRML MDRD: 53 ML/MIN/1.73M2
GLUCOSE BLDC GLUCOMTR-MCNC: 138 MG/DL (ref 70–99)
GLUCOSE BLDC GLUCOMTR-MCNC: 95 MG/DL (ref 70–99)
GLUCOSE SERPL-MCNC: 81 MG/DL (ref 70–99)
GLUCOSE UR STRIP-MCNC: NEGATIVE MG/DL
HCT VFR BLD AUTO: 31.1 % (ref 40–53)
HGB BLD-MCNC: 9.6 G/DL (ref 13.3–17.7)
HGB UR QL STRIP: NEGATIVE
HYALINE CASTS: 7 /LPF
IMM GRANULOCYTES # BLD: 0 10E3/UL
IMM GRANULOCYTES NFR BLD: 0 %
KETONES UR STRIP-MCNC: NEGATIVE MG/DL
LEUKOCYTE ESTERASE UR QL STRIP: NEGATIVE
LYMPHOCYTES # BLD AUTO: 0.9 10E3/UL (ref 0.8–5.3)
LYMPHOCYTES NFR BLD AUTO: 11 %
MAGNESIUM SERPL-MCNC: 2.1 MG/DL (ref 1.7–2.3)
MCH RBC QN AUTO: 32.4 PG (ref 26.5–33)
MCHC RBC AUTO-ENTMCNC: 30.9 G/DL (ref 31.5–36.5)
MCV RBC AUTO: 105 FL (ref 78–100)
MONOCYTES # BLD AUTO: 0.5 10E3/UL (ref 0–1.3)
MONOCYTES NFR BLD AUTO: 6 %
MUCOUS THREADS #/AREA URNS LPF: PRESENT /LPF
NEUTROPHILS # BLD AUTO: 6.7 10E3/UL (ref 1.6–8.3)
NEUTROPHILS NFR BLD AUTO: 81 %
NITRATE UR QL: NEGATIVE
NRBC # BLD AUTO: 0 10E3/UL
NRBC BLD AUTO-RTO: 0 /100
PH UR STRIP: 5 [PH] (ref 5–7)
PHOSPHATE SERPL-MCNC: 6 MG/DL (ref 2.5–4.5)
PLATELET # BLD AUTO: 462 10E3/UL (ref 150–450)
POTASSIUM SERPL-SCNC: 5.6 MMOL/L (ref 3.4–5.3)
PROT SERPL-MCNC: 6.6 G/DL (ref 6.4–8.3)
RBC # BLD AUTO: 2.96 10E6/UL (ref 4.4–5.9)
RBC URINE: <1 /HPF
SARS-COV-2 RNA RESP QL NAA+PROBE: NEGATIVE
SODIUM SERPL-SCNC: 133 MMOL/L (ref 136–145)
SP GR UR STRIP: 1.02 (ref 1–1.03)
SPERM #/AREA URNS HPF: PRESENT /HPF
SQUAMOUS EPITHELIAL: <1 /HPF
TRANSITIONAL EPI: <1 /HPF
UROBILINOGEN UR STRIP-MCNC: NORMAL MG/DL
WBC # BLD AUTO: 8.3 10E3/UL (ref 4–11)
WBC URINE: 0 /HPF

## 2023-03-22 PROCEDURE — 99235 HOSP IP/OBS SAME DATE MOD 70: CPT | Mod: 24 | Performed by: TRANSPLANT SURGERY

## 2023-03-22 PROCEDURE — 87086 URINE CULTURE/COLONY COUNT: CPT | Performed by: PHYSICIAN ASSISTANT

## 2023-03-22 PROCEDURE — 250N000013 HC RX MED GY IP 250 OP 250 PS 637: Performed by: PHYSICIAN ASSISTANT

## 2023-03-22 PROCEDURE — 250N000012 HC RX MED GY IP 250 OP 636 PS 637: Performed by: PHYSICIAN ASSISTANT

## 2023-03-22 PROCEDURE — U0005 INFEC AGEN DETEC AMPLI PROBE: HCPCS | Performed by: PHYSICIAN ASSISTANT

## 2023-03-22 PROCEDURE — 85025 COMPLETE CBC W/AUTO DIFF WBC: CPT | Performed by: PHYSICIAN ASSISTANT

## 2023-03-22 PROCEDURE — 999N000128 HC STATISTIC PERIPHERAL IV START W/O US GUIDANCE

## 2023-03-22 PROCEDURE — 71250 CT THORAX DX C-: CPT

## 2023-03-22 PROCEDURE — 120N000011 HC R&B TRANSPLANT UMMC

## 2023-03-22 PROCEDURE — 80053 COMPREHEN METABOLIC PANEL: CPT | Performed by: PHYSICIAN ASSISTANT

## 2023-03-22 PROCEDURE — 71250 CT THORAX DX C-: CPT | Mod: 26 | Performed by: RADIOLOGY

## 2023-03-22 PROCEDURE — 84100 ASSAY OF PHOSPHORUS: CPT | Performed by: PHYSICIAN ASSISTANT

## 2023-03-22 PROCEDURE — 81001 URINALYSIS AUTO W/SCOPE: CPT | Performed by: PHYSICIAN ASSISTANT

## 2023-03-22 PROCEDURE — 99223 1ST HOSP IP/OBS HIGH 75: CPT | Mod: FS

## 2023-03-22 PROCEDURE — 83735 ASSAY OF MAGNESIUM: CPT | Performed by: PHYSICIAN ASSISTANT

## 2023-03-22 PROCEDURE — 36415 COLL VENOUS BLD VENIPUNCTURE: CPT | Performed by: PHYSICIAN ASSISTANT

## 2023-03-22 PROCEDURE — 258N000003 HC RX IP 258 OP 636: Performed by: INTERNAL MEDICINE

## 2023-03-22 PROCEDURE — 5A1D70Z PERFORMANCE OF URINARY FILTRATION, INTERMITTENT, LESS THAN 6 HOURS PER DAY: ICD-10-PCS | Performed by: INTERNAL MEDICINE

## 2023-03-22 PROCEDURE — 250N000011 HC RX IP 250 OP 636: Performed by: INTERNAL MEDICINE

## 2023-03-22 PROCEDURE — 90937 HEMODIALYSIS REPEATED EVAL: CPT

## 2023-03-22 PROCEDURE — 82248 BILIRUBIN DIRECT: CPT | Performed by: PHYSICIAN ASSISTANT

## 2023-03-22 RX ORDER — ALBUMIN (HUMAN) 12.5 G/50ML
50 SOLUTION INTRAVENOUS
Status: DISCONTINUED | OUTPATIENT
Start: 2023-03-22 | End: 2023-03-22

## 2023-03-22 RX ORDER — VALGANCICLOVIR 450 MG/1
450 TABLET, FILM COATED ORAL
Status: DISCONTINUED | OUTPATIENT
Start: 2023-03-23 | End: 2023-03-22

## 2023-03-22 RX ORDER — ACETAMINOPHEN 325 MG/1
650 TABLET ORAL EVERY 8 HOURS PRN
Status: DISCONTINUED | OUTPATIENT
Start: 2023-03-22 | End: 2023-03-22 | Stop reason: HOSPADM

## 2023-03-22 RX ORDER — FUROSEMIDE 40 MG
40 TABLET ORAL DAILY
Status: DISCONTINUED | OUTPATIENT
Start: 2023-03-22 | End: 2023-03-22 | Stop reason: HOSPADM

## 2023-03-22 RX ORDER — FLUDROCORTISONE ACETATE 0.1 MG/1
0.1 TABLET ORAL DAILY
Status: DISCONTINUED | OUTPATIENT
Start: 2023-03-22 | End: 2023-03-22 | Stop reason: HOSPADM

## 2023-03-22 RX ORDER — TACROLIMUS 1 MG/1
2 CAPSULE ORAL
Status: DISCONTINUED | OUTPATIENT
Start: 2023-03-22 | End: 2023-03-22

## 2023-03-22 RX ORDER — PANTOPRAZOLE SODIUM 40 MG/1
40 TABLET, DELAYED RELEASE ORAL 2 TIMES DAILY
Status: DISCONTINUED | OUTPATIENT
Start: 2023-03-22 | End: 2023-03-22 | Stop reason: HOSPADM

## 2023-03-22 RX ORDER — METOPROLOL TARTRATE 25 MG/1
25 TABLET, FILM COATED ORAL 2 TIMES DAILY
Status: DISCONTINUED | OUTPATIENT
Start: 2023-03-22 | End: 2023-03-22 | Stop reason: HOSPADM

## 2023-03-22 RX ORDER — ASPIRIN 325 MG
325 TABLET ORAL DAILY
Status: DISCONTINUED | OUTPATIENT
Start: 2023-03-22 | End: 2023-03-22 | Stop reason: HOSPADM

## 2023-03-22 RX ORDER — DEXTROSE MONOHYDRATE 25 G/50ML
25-50 INJECTION, SOLUTION INTRAVENOUS
Status: DISCONTINUED | OUTPATIENT
Start: 2023-03-22 | End: 2023-03-22 | Stop reason: HOSPADM

## 2023-03-22 RX ORDER — ONDANSETRON 4 MG/1
4 TABLET, ORALLY DISINTEGRATING ORAL EVERY 6 HOURS PRN
Status: DISCONTINUED | OUTPATIENT
Start: 2023-03-22 | End: 2023-03-22 | Stop reason: HOSPADM

## 2023-03-22 RX ORDER — LIDOCAINE 40 MG/G
CREAM TOPICAL
Status: DISCONTINUED | OUTPATIENT
Start: 2023-03-22 | End: 2023-03-22 | Stop reason: HOSPADM

## 2023-03-22 RX ORDER — MAGNESIUM OXIDE 400 MG/1
400 TABLET ORAL EVERY 24 HOURS
Status: DISCONTINUED | OUTPATIENT
Start: 2023-03-22 | End: 2023-03-22 | Stop reason: HOSPADM

## 2023-03-22 RX ORDER — METHOCARBAMOL 500 MG/1
500 TABLET, FILM COATED ORAL 3 TIMES DAILY PRN
Status: DISCONTINUED | OUTPATIENT
Start: 2023-03-22 | End: 2023-03-22 | Stop reason: HOSPADM

## 2023-03-22 RX ORDER — VITAMIN B COMPLEX
25 TABLET ORAL DAILY
Status: DISCONTINUED | OUTPATIENT
Start: 2023-03-22 | End: 2023-03-22 | Stop reason: HOSPADM

## 2023-03-22 RX ORDER — VALGANCICLOVIR 450 MG/1
450 TABLET, FILM COATED ORAL
Status: DISCONTINUED | OUTPATIENT
Start: 2023-03-25 | End: 2023-03-22 | Stop reason: HOSPADM

## 2023-03-22 RX ORDER — MYCOPHENOLATE MOFETIL 250 MG/1
750 CAPSULE ORAL 2 TIMES DAILY
Status: DISCONTINUED | OUTPATIENT
Start: 2023-03-22 | End: 2023-03-22 | Stop reason: HOSPADM

## 2023-03-22 RX ORDER — LIDOCAINE 4 G/G
2 PATCH TOPICAL
Status: DISCONTINUED | OUTPATIENT
Start: 2023-03-22 | End: 2023-03-22 | Stop reason: HOSPADM

## 2023-03-22 RX ORDER — NICOTINE POLACRILEX 4 MG
15-30 LOZENGE BUCCAL
Status: DISCONTINUED | OUTPATIENT
Start: 2023-03-22 | End: 2023-03-22 | Stop reason: HOSPADM

## 2023-03-22 RX ORDER — SULFAMETHOXAZOLE AND TRIMETHOPRIM 400; 80 MG/1; MG/1
1 TABLET ORAL
Status: DISCONTINUED | OUTPATIENT
Start: 2023-03-22 | End: 2023-03-22 | Stop reason: HOSPADM

## 2023-03-22 RX ORDER — URSODIOL 300 MG/1
300 CAPSULE ORAL 2 TIMES DAILY
Status: DISCONTINUED | OUTPATIENT
Start: 2023-03-22 | End: 2023-03-22 | Stop reason: HOSPADM

## 2023-03-22 RX ORDER — TACROLIMUS 0.5 MG/1
0.5 CAPSULE ORAL
Status: DISCONTINUED | OUTPATIENT
Start: 2023-03-22 | End: 2023-03-22

## 2023-03-22 RX ORDER — PREDNISONE 5 MG/1
5 TABLET ORAL DAILY
Status: DISCONTINUED | OUTPATIENT
Start: 2023-03-22 | End: 2023-03-22 | Stop reason: HOSPADM

## 2023-03-22 RX ORDER — FUROSEMIDE 40 MG
40 TABLET ORAL DAILY
Qty: 30 TABLET | Refills: 1 | Status: SHIPPED | OUTPATIENT
Start: 2023-03-22 | End: 2023-03-28

## 2023-03-22 RX ORDER — FLUDROCORTISONE ACETATE 0.1 MG/1
0.1 TABLET ORAL DAILY
Qty: 30 TABLET | Refills: 1 | Status: SHIPPED | OUTPATIENT
Start: 2023-03-22 | End: 2023-04-12

## 2023-03-22 RX ORDER — HEPARIN SODIUM 1000 [USP'U]/ML
1 INJECTION, SOLUTION INTRAVENOUS; SUBCUTANEOUS EVERY 24 HOURS
Status: CANCELLED
Start: 2023-03-27

## 2023-03-22 RX ADMIN — FLUDROCORTISONE ACETATE 0.1 MG: 0.1 TABLET ORAL at 15:16

## 2023-03-22 RX ADMIN — URSODIOL 300 MG: 300 CAPSULE ORAL at 10:07

## 2023-03-22 RX ADMIN — PANTOPRAZOLE SODIUM 40 MG: 40 TABLET, DELAYED RELEASE ORAL at 10:07

## 2023-03-22 RX ADMIN — MYCOPHENOLATE MOFETIL 750 MG: 250 CAPSULE ORAL at 10:07

## 2023-03-22 RX ADMIN — HEPARIN SODIUM 2000 UNITS: 1000 INJECTION INTRAVENOUS; SUBCUTANEOUS at 14:53

## 2023-03-22 RX ADMIN — INSULIN GLARGINE 15 UNITS: 100 INJECTION, SOLUTION SUBCUTANEOUS at 10:49

## 2023-03-22 RX ADMIN — METOPROLOL TARTRATE 25 MG: 25 TABLET, FILM COATED ORAL at 10:09

## 2023-03-22 RX ADMIN — TACROLIMUS 2.5 MG: 1 CAPSULE ORAL at 10:07

## 2023-03-22 RX ADMIN — SODIUM CHLORIDE 300 ML: 9 INJECTION, SOLUTION INTRAVENOUS at 12:46

## 2023-03-22 RX ADMIN — Medication: at 12:46

## 2023-03-22 RX ADMIN — Medication 25 MCG: at 10:07

## 2023-03-22 RX ADMIN — MAGNESIUM OXIDE TAB 400 MG (241.3 MG ELEMENTAL MG) 400 MG: 400 (241.3 MG) TAB at 15:16

## 2023-03-22 RX ADMIN — SODIUM CHLORIDE 250 ML: 9 INJECTION, SOLUTION INTRAVENOUS at 12:46

## 2023-03-22 RX ADMIN — SULFAMETHOXAZOLE AND TRIMETHOPRIM 1 TABLET: 400; 80 TABLET ORAL at 10:09

## 2023-03-22 RX ADMIN — PREDNISONE 5 MG: 5 TABLET ORAL at 10:08

## 2023-03-22 RX ADMIN — FUROSEMIDE 40 MG: 40 TABLET ORAL at 15:15

## 2023-03-22 RX ADMIN — LIDOCAINE PATCH 4% 2 PATCH: 40 PATCH TOPICAL at 10:49

## 2023-03-22 RX ADMIN — ASPIRIN 325 MG ORAL TABLET 325 MG: 325 PILL ORAL at 10:09

## 2023-03-22 RX ADMIN — HEPARIN SODIUM 2100 UNITS: 1000 INJECTION INTRAVENOUS; SUBCUTANEOUS at 14:52

## 2023-03-22 RX ADMIN — THIAMINE HCL TAB 100 MG 100 MG: 100 TAB at 10:08

## 2023-03-22 ASSESSMENT — ACTIVITIES OF DAILY LIVING (ADL)
ADLS_ACUITY_SCORE: 35
DIFFICULTY_COMMUNICATING: NO
HEARING_DIFFICULTY_OR_DEAF: NO
ADLS_ACUITY_SCORE: 35
TRANSFERRING: 0-->INDEPENDENT
WALKING_OR_CLIMBING_STAIRS_DIFFICULTY: YES
ADLS_ACUITY_SCORE: 35
WEAR_GLASSES_OR_BLIND: YES
CONCENTRATING,_REMEMBERING_OR_MAKING_DECISIONS_DIFFICULTY: NO
WALKING_OR_CLIMBING_STAIRS: STAIR CLIMBING DIFFICULTY, ASSISTANCE 1 PERSON
FALL_HISTORY_WITHIN_LAST_SIX_MONTHS: YES
TOILETING_ISSUES: NO
TRANSFERRING: 0-->INDEPENDENT
DOING_ERRANDS_INDEPENDENTLY_DIFFICULTY: NO
CHANGE_IN_FUNCTIONAL_STATUS_SINCE_ONSET_OF_CURRENT_ILLNESS/INJURY: NO
DIFFICULTY_EATING/SWALLOWING: NO
NUMBER_OF_TIMES_PATIENT_HAS_FALLEN_WITHIN_LAST_SIX_MONTHS: 1
ADLS_ACUITY_SCORE: 35
DEPENDENT_IADLS:: CLEANING;COOKING;LAUNDRY;SHOPPING;MEAL PREPARATION;MEDICATION MANAGEMENT;MONEY MANAGEMENT
DRESSING/BATHING_DIFFICULTY: NO

## 2023-03-22 NOTE — PLAN OF CARE
DISCHARGE:  D: Patient with orders to discharge to home.   I: Discharge instructions, medications, & follow ups reviewed with patient. Copy of discharge summary given to patient.  Right PIV removed. All belongings packed & sent with patient. Medications filled & picked up at discharge pharmacy.   A: Patient in stable condition. AVSS. Patient and family had no further questions regarding discharge instructions and medications. Patient transferred out by wheelchair & left with family.   P: Plan for patient to return home.

## 2023-03-22 NOTE — CONSULTS
[facility]  Transplant Nephrology Consult  Date of Admission:  3/22/2023  Today's Date: 03/22/2023  Requesting physician: Demetrius Hardy MD    Recommendations:  - Stopped dialysis after 2h given SCr downtrending from 3/20 without dialysis signaling renal recovery   - OK to discharge to home.  - Recommend florinef 0.1 mg PO daily and furosemide 40 mg PO daily as outpatient.  - Check labs MWF as outpatient with next labs on Friday 3/24, monitor urine output.  -If Scr continues to downtrend next week will remove line  -No need for dialysis unit placement    Assessment & Plan   # ASHISH: Down from peak creatinine 3.18 (3/17/23)    - ASHISH likely secondary to hemorrhagic shock and hemodynamic changes following liver transplant, as well as mild HRS prior to transplant.   -Stopped iHD today after 2h given downtrend in Scr   -Obtain labs MWF with next labs on Friday 3/24. If Scr continues to downtrend next week will arrange for line removal   -HD access: RIJ tunneled line    # Liver Tx: ESLD secondary to alcoholic cirrhosis, s/p OLT 2/28/23.  Transaminases and total bilirubin now normal.  Followed by Transplant Surgery.    # Immunosuppression: Tacrolimus immediate release (goal 6-8), Mycophenolate mofetil (dose 750 mg every 12 hours) and Prednisone (dose 5 mg daily)   - Induction with Recent Transplant:  Per Liver Tx protocol.   - Changes: Not at this time; Management per Transplant Surgery.    # Infection Prophylaxis:   - PJP: Sulfa/TMP (Bactrim)  - CMV: Valganciclovir (Valcyte); CMV IgG Ab recipient and donor negative    # Blood Pressure: Controlled;  Goal BP: < 140/90 (Hospitalization goal)   - Volume status: Total body volume up, but intravascularly euvolemic  EDW ~ 69.0 kg   - Changes: Yes - start lasix 40mg PO daily     # Diabetes: Controlled (HbA1c <7%) Last HbA1c: 5.2% (3/12/23)   - Management as per primary team.    # Anemia in Chronic Disease: Hgb: Stable      MARISEL:  No   - Iron studies: Low iron saturation, but high ferritin (2/22/23)    # Mineral Bone Disorder:   - Vitamin D; level: Low (12/20/22)        On supplement: Yes, cholecalciferol 25 mcg PO daily.  - Calcium; level: Normal        On supplement: No  - Phosphorus; level: High        On binder: No    # Electrolytes:   - Potassium; level: High        On supplement: No, start lasix 40mg PO daily and florinef 0.1mg daily  - Magnesium; level: Normal        On supplement: Yes, magnesium oxide 400 mg PO QD  - Bicarbonate; level: Normal        On supplement: No   -Hyponatremia. Modulate with HD    # EBV IgG Ab Discordance (D+/R-): Recoommend surveillance EBV PCR qmonth until 12 months post transplant, then q3 months until 2 years post transplant.    # Aspergers/Depression/Hallucinations: Seen by Psychiatry. On fluoxetine. No reports of recent hallucinations.    # Concern for cavitary lesion on CXR 3/17:   -Non con chest CT negative for cavitary lesion today    # Transplant History:  Etiology of Organ Failure: Alcoholic cirrhosis  Tx: Liver Tx  Transplant: 2/28/2023 (Liver)  Donor Type:  Donor Class:   Crossmatch at time of Tx: negative  DSA at time of Tx: No   Significant changes in immunosuppression: Previously on lower CNI goal due to ASHISH  Significant transplant-related complications: ASHISH    Recommendations were communicated to the primary team verbally.    Seen and discussed with VAMSHI Alatorre CNP  Pager: [phone]      Physician Attestation     I saw and evaluated Dillon Salter as part of a shared APRN/PA visit.     I personally reviewed the vital signs, medications, labs and imaging.    I personally performed the substantive portion of the medical decision making for this visit - please see the CHRISTA's documentation for full details.    Key management decisions made by me and carried out under my direction: Scr downtrending, K stable. Stopped HD after 2h. Will plan to obtain labs MWF until renal  recovery. If labs stable next week will have tunneled line removed. Send home on florinef 0.1mg daily and lasix 40mg daily     I personally performed the substantive portion of the history for this visit - please see the CHRISTA's documentation for full details.  Key additional history findings made by me: Increasing UOP. Pt was called in because of CXR on 3/17 concerning for cavitary lesion. CT performed of chest was negative for cavitary lesion. Scr downtrending from 3/20 and K stable so patient is recovering. HD stopped after 2h.     Anderson Jones MD  Date of Service (when I saw the patient): 03/22/23    REASON FOR CONSULT   ASHISH after liver transplant    History of Present Illness   Dillon Salter is a 27 year old male with a history of ETOH cirrhosis s/p liver transplant 2/28/23 and subsequent ASHISH thought to be caused by hemorrhagic shock and possible pretransplant mild hepatorenal syndrome.  He was found to have a possible cavitary opacity overlying the medial posterior right sixth rib on chest xray 3/17/23.  He is admitted today for repeat CT and evaluation.  He will also receive dialysis today.  No cavitary leasion was identified on CT today.    Mr. Salter reports feeling well today.  No SOB on room air.  Denies N/V/D.  Reports some abdominal soreness, but doesn't describe it as painful.  Creatinine is trending down and pt reports making more urine.     Review of Systems    The 10 point Review of Systems is negative other than noted in the HPI or here.     Past Medical History    I have reviewed this patient's medical history and updated it with pertinent information if needed.   Past Medical History:   Diagnosis Date     Acute on chronic anemia 04/08/2022     Alcohol use disorder      Alcohol use disorder, severe, dependence (H) 07/11/2022     Alcoholic cirrhosis of liver with ascites (H)      Alcoholic hepatitis      Autism spectrum disorder 04/08/2022    High functioning autistic.  26-year-old history of  autism/Asperger's on the spectrum graduated high school at [employer] worked at  and then another  place until the Covid epidemic in 2020 lives with parents (Leticia and Wes) socially isolated drinks alcohol beer daily      Benign essential hypertension      Bleeding esophageal varices (H) 06/18/2022     Hepatic encephalopathy      Hyponatremia 07/28/2022     Major depressive disorder      Type II Diabetes        Past Surgical History   I have reviewed this patient's surgical history and updated it with pertinent information if needed.  Past Surgical History:   Procedure Laterality Date     BENCH LIVER  2/28/2023    Procedure: Bench liver;  Surgeon: Thelma Sterling MD;  Location: [facility] OR     COLONOSCOPY N/A 1/27/2023    Procedure: Colonoscopy;  Surgeon: Osman Montoya MD;  Location: [facility] OR     ENDOSCOPIC ULTRASOUND LOWER GASTROINTESTIONAL TRACT (GI) N/A 1/27/2023    Procedure: ULTRASOUND, LOWER GI TRACT, ENDOSCOPIC with glueing;  Surgeon: Osman Montoya MD;  Location: [facility] OR     ESOPHAGOSCOPY, GASTROSCOPY, DUODENOSCOPY (EGD), COMBINED N/A 5/24/2022    Procedure: ESOPHAGOGASTRODUODENOSCOPY (EGD) with esophageal banding;  Surgeon: Gary Hurst MD;  Location: [facility] OR     ESOPHAGOSCOPY, GASTROSCOPY, DUODENOSCOPY (EGD), COMBINED N/A 6/20/2022    Procedure: ESOPHAGOGASTRODUODENOSCOPY;  Surgeon: Gavino Reza MD;  Location: [facility] OR     ESOPHAGOSCOPY, GASTROSCOPY, DUODENOSCOPY (EGD), COMBINED N/A 1/23/2023    Procedure: ESOPHAGOGASTRODUODENOSCOPY (EGD) with esophageal variceal banding;  Surgeon: Juan Boo MD;  Location: [facility] OR     ESOPHAGOSCOPY, GASTROSCOPY, DUODENOSCOPY (EGD), COMBINED N/A 1/25/2023    Procedure: ESOPHAGOGASTRODUODENOSCOPY (EGD);  Surgeon: Juan Boo MD;  Location: [facility] OR     ESOPHAGOSCOPY, GASTROSCOPY, DUODENOSCOPY (EGD), COMBINED N/A 1/27/2023    Procedure: Esophagoscopy, gastroscopy, duodenoscopy (EGD), combined;   Surgeon: Osman Montoya MD;  Location: [facility] OR     ESOPHAGOSCOPY, GASTROSCOPY, DUODENOSCOPY (EGD), COMBINED N/A 2023    Procedure: ESOPHAGOGASTRODUODENOSCOPY (EGD);  Surgeon: Leventhal, Thomas Michael, MD;  Location: [facility] OR     IR CVC TUNNEL PLACEMENT > 5 YRS OF AGE  3/10/2023     MIDLINE DOUBLE LUMEN PLACEMENT  2022          PICC TRIPLE LUMEN PLACEMENT  2022          RETURN LIVER TRANSPLANT N/A 3/1/2023    Procedure: RETURN TO OPERATING ROOM, AFTER LIVER TRANSPLANT;  Surgeon: Thelma Sterling MD;  Location: [facility] OR     TRANSPLANT LIVER RECIPIENT  DONOR N/A 2023    Procedure: Transplant liver recipient  donor;  Surgeon: Thelma Sterling MD;  Location: [facility] OR       Family History   I have reviewed this patient's family history and updated it with pertinent information if needed.   Family History   Problem Relation Age of Onset     Hypertension Mother      Substance Abuse Mother      Thyroid Disease Mother      Heart Failure Father      Substance Abuse Father      Chronic Obstructive Pulmonary Disease Father      Substance Abuse Maternal Grandfather        Social History   I have reviewed this patient's social history and updated it with pertinent information if needed. Dillon Salter  reports that he has quit smoking. He has never used smokeless tobacco. He reports that he does not currently use alcohol. He reports that he does not currently use drugs after having used the following drugs: Marijuana.    Allergies   No Known Allergies  Prior to Admission Medications     sodium chloride 0.9%  250 mL Intravenous Once in dialysis/CRRT     sodium chloride 0.9%  300 mL Hemodialysis Machine Once     aspirin  325 mg Per Feeding Tube Daily     FLUoxetine  60 mg Oral At Bedtime     sodium chloride (PF) 0.9%  10 mL Intracatheter Once in dialysis/CRRT    Followed by     heparin  1.3-2.6 mL Intracatheter Once in dialysis/CRRT     sodium chloride (PF) 0.9%  10 mL Intracatheter Once in dialysis/CRRT  "   Followed by     heparin  1.3-2.6 mL Intracatheter Once in dialysis/CRRT     insulin aspart  1-7 Units Subcutaneous TID AC     insulin aspart  1-5 Units Subcutaneous At Bedtime     insulin glargine  10 Units Subcutaneous At Bedtime     insulin glargine  15 Units Subcutaneous QAM AC     lidocaine  2 patch Transdermal Q24H     lidocaine   Transdermal Q8H GARETH     magnesium oxide  400 mg Oral Q24H     metoprolol tartrate  25 mg Oral BID     mycophenolate  750 mg Oral BID     - MEDICATION INSTRUCTIONS -   Does not apply Once     pantoprazole  40 mg Oral BID     predniSONE  5 mg Oral Daily     sodium chloride (PF)  3 mL Intracatheter Q8H     sulfamethoxazole-trimethoprim  1 tablet Oral or Feeding Tube Once per day on      tacrolimus  2.5 mg Oral BID IS     thiamine  100 mg Oral Daily     ursodiol  300 mg Oral BID     [START ON 3/25/2023] valGANciclovir  450 mg Oral Once per day on Tue Sat     Vitamin D3  25 mcg Oral or Feeding Tube Daily         Physical Exam   Temp  Av.7  F (36.5  C)  Min: 92.8  F (33.8  C)  Max: 99.5  F (37.5  C)  Arterial Line BP  Min: 78/56  Max: 173/78  Arterial Line MAP (mmHg)  Av.4 mmHg  Min: 56 mmHg  Max: 190 mmHg      Pulse  Av.5  Min: 62  Max: 141 Resp  Avg: 15.7  Min: 5  Max: 41  FiO2 (%)  Av.5 %  Min: 30 %  Max: 100 %  SpO2  Av.8 %  Min: 84 %  Max: 100 %     /86   Temp 98.1  F (36.7  C)   Resp 20   Ht 1.651 m (5' 5\")   Wt 72.9 kg (160 lb 11.2 oz)   BMI 26.74 kg/m      Admit Weight: 72.9 kg (160 lb 11.2 oz)     GENERAL APPEARANCE: alert and no distress  HENT: mouth without ulcers or lesions  RESP: lungs clear to auscultation - no rales, rhonchi or wheezes  CV: regular rhythm, normal rate, no rub, no murmur  EDEMA: 2+ LE edema bilaterally  ABDOMEN: soft, nondistended, nontender, bowel sounds normal  MS: extremities normal - no gross deformities noted, no evidence of inflammation in joints, no muscle tenderness  SKIN: no rash  PSYCH: mentation " appears normal and affect normal/bright  DIALYSIS ACCESS:  Right IJ tunneled catheter    Data   CMP  Recent Labs   Lab 03/22/23  0908 03/20/23  1001 03/19/23  0805 03/19/23  0740 03/18/23  0646 03/18/23  0618   * 132*  --  129*  --  132*   POTASSIUM 5.6* 5.4*  --  4.9  --  3.9   CHLORIDE 96* 94*  --  95*  --  95*   CO2 26 26  --  23  --  27   ANIONGAP 11 12  --  11  --  10   GLC 81 90 154* 129*   < > 68*   BUN 60.2* 39.1*  --  59.9*  --  39.6*   CR 1.76* 1.86*  --  2.58*  --  1.96*   GFRESTIMATED 53* 50*  --  34*  --  47*   SARTHAK 9.9 9.7  --  9.5  --  9.2   MAG 2.1 1.8  --  2.3  --  1.8   PHOS 6.0* 5.1*  --  5.5*  --  4.0   PROTTOTAL 6.6 6.5  --  6.0*  --  5.7*   ALBUMIN 3.7 3.7  --  3.3*  --  3.4*   BILITOTAL 1.1 1.2  --  1.1  --  1.0   ALKPHOS 265* 277*  --  269*  --  259*   AST 21 33  --  22  --  18   ALT 10 17  --  10  --  13    < > = values in this interval not displayed.     CBC  Recent Labs   Lab 03/22/23  0908 03/20/23  1001 03/19/23  0740 03/18/23  0618   HGB 9.6* 9.3* 9.3* 7.2*   WBC 8.3 7.6 8.0 7.1   RBC 2.96* 2.84* 2.88* 2.24*   HCT 31.1* 29.0* 29.6* 23.3*   * 102* 103* 104*   MCH 32.4 32.7 32.3 32.1   MCHC 30.9* 32.1 31.4* 30.9*   RDW 18.4* 18.5* 19.0* 19.9*   * 377 361 274     INRNo lab results found in last 7 days.  ABGNo lab results found in last 7 days.   Urine Studies  Recent Labs   Lab Test 03/12/23  1134 02/27/23  1616 02/24/23  1818 02/22/23  1421   COLOR Dark Yellow* Yellow Light Yellow Yellow   APPEARANCE Cloudy* Clear Clear Clear   URINEGLC 30* Negative Negative Negative   URINEBILI Small* Negative Negative Negative   URINEKETONE Negative Negative Negative Negative   SG 1.027 1.012 1.009 1.010   UBLD Moderate* Negative Negative Negative   URINEPH 5.0 5.5 5.0 5.0   PROTEIN 100* 30* Negative Negative   NITRITE Negative Negative Negative Negative   LEUKEST Small* Negative Negative Negative   RBCU 20* <1 <1 1   WBCU 112* 2 1 2     No lab results found.  PTH  No lab results  found.  Iron Studies  Recent Labs   Lab Test 02/22/23  0610 02/20/23  0730 02/16/23  0543 12/20/22  0703 10/06/22  0958 04/07/22  0624   IRON 26*  --   --  249*  --  85   *  --   --   --   --   --    IRONSAT 19  --   --   --   --   --    ASCENCION 1,465* 1,335* 1,725* 2,207* 2,042* 423*       IMAGING:  All imaging studies reviewed by me.

## 2023-03-22 NOTE — PROGRESS NOTES
Care Management Initial Consult    General Information  Assessment completed with: VM-chart review, Care Team Member,    Type of CM/SW Visit: Initial Assessment    Primary Care Provider verified and updated as needed: Yes   Readmission within the last 30 days: other (see comments)   Return Category:  (Cavity Leison work up.)  Reason for Consult: discharge planning  Advance Care Planning:            Communication Assessment  Patient's communication style: spoken language (English or Bilingual)    Hearing Difficulty or Deaf: no   Wear Glasses or Blind: yes    Cognitive  Cognitive/Neuro/Behavioral: WDL  Level of Consciousness: alert  Arousal Level: opens eyes spontaneously  Orientation: oriented x 4  Mood/Behavior: calm, cooperative  Best Language: 0 - No aphasia  Speech: clear, spontaneous    Living Environment:   People in home: parent(s)     Current living Arrangements: house      Able to return to prior arrangements: yes       Family/Social Support:  Care provided by: self, parent(s)  Provides care for: no one, unable/limited ability to care for self                Description of Support System:           Current Resources:   Patient receiving home care services: Yes  Skilled Home Care Services: Skilled Nursing, Physical Therapy, Occupational Therapy  Community Resources:    Equipment currently used at home: none  Supplies currently used at home: None    Employment/Financial:  Employment Status:          Financial Concerns: No concerns identified           Lifestyle & Psychosocial Needs:  Social Determinants of Health     Tobacco Use: Medium Risk     Smoking Tobacco Use: Former     Smokeless Tobacco Use: Never     Passive Exposure: Not on file   Alcohol Use: Not on file   Financial Resource Strain: Not on file   Food Insecurity: Not on file   Transportation Needs: Not on file   Physical Activity: Not on file   Stress: Not on file   Social Connections: Not on file   Intimate Partner Violence: Not on file    Depression: Not at risk     PHQ-2 Score: 0   Housing Stability: Not on file       Functional Status:  Prior to admission patient needed assistance:   Dependent ADLs:: Independent  Dependent IADLs:: Cleaning, Cooking, Laundry, Shopping, Meal Preparation, Medication Management, Money Management    Additional Information:   Pt readmitted d/t needing CT Chest to assess cavitary lesion on CXR.  Outpatient HD unit will not bel able to accept patient until this has been completed.    Spoke with CONI Desir who confirmed that they would need CT Chest documentation and that we would have coordinate HD placement with [facility] Admissions   # [phone]  Fax # [phone].  Spoke with Leonie [facility] Pending sale to [facility] regarding OP HD placement.  Agreed to update once CT Chest had been completed.    1220: Notified by [facility]NAMOR[facility] Transplant that CT Chest resulted and pt medically cleared for discharge.  Faxed CT Chest result to [facility]    1400:  Spoke with rep with [facility] who referred writer to Tracy Hernández to confirm OP HD placement.  Spoke with Tracy Odom.  OP HD placement confirmed:    Outpatient Dialysis:  [facility]  [address]  [address]   [place] [address]  Phone [phone]  Monday, Wednesday, Friday 9:30 a.m. chair time  First outpatient dialysis run scheduled for Friday 3/24.  Please plan to arrive at 8:45 a.m. and bring insurance card and photo ID.      1430:  Notified by [facility] that pt WILL NOT NEED OP HD.  Pt will need transplant labs MWF for at least the next two weeks.  Pt will discharge with his dialysis catheter that will require weekly line care/flushes.  Spoke with Lee Stanley RN with [facility] who confirmed they are able to provide line care support.  Provider will need to place a therapy plan in Epic.  Reviewed that pt is scheduled to transplant surgery on Monday 3/27 and 4/3 inquired if  [facility] appointments could be scheduled close to clinic appointments.   Writer spoke with scheduling at [facility] and changed lab appointments for the next two weeks to [facility].      1615:  left for Edgar Lesliewood canceling outpatient HD.      Leilani Lockhart, RN BSN, PHN, ACM-RN  7A RN Care Coordinator  Phone: [phone]  Pager [phone]    To contact the weekend RNCC  [place] (0800 - 1630) Saturday and Sunday    Units: 4A, 4C, 4E, 5A and 5B- Pager 1: [phone]    Units: 6A, 6B, 6C, 6D- Pager 2: [phone]    Units: 7A, 7B, 7C, 7D, and 5C-Pager 3: [phone]    [facility] ([phone]) Saturday and Sunday    Units: 5 Ortho, 8A, 10 ICU, & Pediatric Units-Pager 4: [phone]    3/22/2023 4:15 PM

## 2023-03-22 NOTE — DISCHARGE SUMMARY
[facility]    Discharge Summary  Transplant Surgery    Date of Admission:  3/22/2023  Date of Discharge:  3/22/2023  Discharging Provider: Caren Guevara PA-C/Ryder Shaikh MD     Discharge Diagnoses   Principal Problem:    Cavitary lesion of lung      History of Present Illness   Dillon Salter is an 27 year old male with a past medical history of Asperger's, DM2, and alcoholic cirrhosis c/b esophageal varices and hepatic encephalopathy. He is now s/p  donor liver transplant without stent on 23 with Dr. Sterling. Returned to OR for washout and repair of arterial bleeding on 3/1/23. Patient was discharged home on 3/19, now being readmitted due to cavitary lesion on CXR and inability to be placed for HD.     Hospital Course   s/p DDLT 23: POD #22. LFTs WNL. Tbili 1.1. ALK PHOS stable at 265.   - ASA 325mg daily  - Ursodiol 300mg BID     Immunosuppression management:  Induction: Steroid taper and basiliximab x2 per renal sparing protocol completed.   Maintenance:   - mg BID   -Tacrolimus: 2.5 mg BID; goal 6-8 for renal sparing. Last level 3.19 7.6 (12 hour trough).   -Prednisone 5mg daily while CNI subtherapeutic      Neuro/Psych:  Acute post op pain in the setting of chronic musculoskeletal + neuropathic pain pain: On oxycodone PTA for neuropathy since ~ 2022. Weaned off Oxy post transplant.   - Tylenol prn  - Robaxin 500mg q8h PRN (pt states this makes him somnolent)   - Lidocaine patch   MDD, Anxiety, Autism spectrum: PTA fluoxetine.      Hematology:   Anemia of chronic disease/Acute blood loss: Hgb stable ~9      Cardiorespiratory: Stable on room air.      GI/Nutrition:   Severe malnutrition in the context of acute illness: Regular diet with supplements      Endocrine:   DM2; Steroid induced hyperglycemia: Lantus 15 units every morning and 10 units every evening + carb coverage + sliding scale insulin PRN.        Fluid/Electrolytes/Neph:    ASHISH: Present prior to transplant (prerenal-hypovolemia 2/2 blood loss) now exacerbated by liver transplant, shock. CRRT started 3/1/23 -- > transitioned to iHD 3/4. Tunneled line placed by IR on 3/10.   * HD per nephrology, plan for MWF HD-[facility]. Due to CXR with cavitary lesion unable to initiate outpatient HD. Consult nephrology for inpatient HD today. Cr improving without HD since 3/19, will plan to proceed without HD outpatient. Continue to monitor labs outpatient.   Hypervolemia/hyponatremia: Volume removal with HD.   Hyperkalemia: K 5.6, HD today. Started Florinef and Lasix per nephrology.      : Oliguric, lowered tacrolimus goal on 3/17. Monitor for renal recovery     ID:   Cavitary lung lesion: Noted on CXR. Quant gold negative on 11/28/22. Chest CT without lesion.   EBV IgG Ab Discordance (D+/R-):  EBV PCR monthly until 12 months post transplant, then  every 3 months until 2 years post transplant. First check ~3/28.      Prophylaxis: DVT (mechanical), fall, GI (PPI), viral (Valcyte x 3 months), pneumocystis (Bactrim -restarted 3/6)     Significant Results and Procedures   3/22/23: CT   No cavitary lung lesion    Pending Results   These results will be followed up by transplant coordinator  Unresulted Labs Ordered in the Past 30 Days of this Admission     Date and Time Order Name Status Description    3/22/2023  8:52 AM Urine Culture Aerobic Bacterial In process     3/1/2023 11:41 AM Prepare red blood cells (unit) Preliminary     3/1/2023 11:41 AM Prepare red blood cells (unit) Preliminary     2/28/2023  3:01 PM Prepare plasma (unit) Preliminary     2/28/2023  3:01 PM Prepare plasma (unit) Preliminary     2/28/2023 11:18 AM Prepare plasma (unit) Preliminary     2/28/2023 11:17 AM Prepare red blood cells (unit) Preliminary     2/28/2023 10:54 AM Prepare red blood cells (unit) Preliminary     2/12/2023 12:12 AM Prepare red blood cells (unit) Preliminary           Code Status   Full    Primary Care  Physician   Gary Rodrigues    PE: See H&P from morning     Time Spent on this Encounter   I, Caren Guevara PA-C, personally saw the patient today and spent greater than 30 minutes discharging this patient.    Discharge Disposition   Discharged to home  Condition at discharge: Stable    Consultations This Hospital Stay   NEPHROLOGY KIDNEY/PANCREAS TRANSPLANT ADULT IP CONSULT  NURSING TO CONSULT FOR VASCULAR ACCESS CARE IP CONSULT    Discharge Orders      Reason for your hospital stay    Patient was admitted for work up of a cavitary lesion prior to dialysis, but lesion was not seen on chest CT, so no additional work up is needed. Will monitor as an outpatient off of dialysis.     Activity    Your activity upon discharge: Walk at least four times a day, lift no greater than 10 pounds for 8 weeks from the time of surgery.  No driving while taking narcotics or 3 weeks after surgery.     When to contact your care team    WHEN TO CONTACT YOUR  COORDINATOR:     Transplant Coordinator [phone]     Notify your coordinator if you have pain over your liver, increased redness or drainage from your incision, fever greater than 100F, or yellowing of skin or eyes.     Notify your coordinator immediately if you are ever unable to take your immunosuppressive medications for any reason.     If you have URGENT concerns after office hours, please call the hospital switchboard at [phone] and ask to have the organ transplant nurse on-call paged. If you have a life-threatening emergency, go to the nearest emergency room.     IV access    **Ordering Provider MUST call/page Care Coordinator/ to discuss arranging this service**    You are going home with the following vascular access device: Hemodialysis.     [facility] Follow-up and recommended labs and tests    [facility] FOLLOW UP:     At this time, will hold off on dialysis to evaluate for renal recovery.    Resume other follow up as  previously arranged.    1. Follow up in Transplant Clinic weekly for the next 5 weeks with Dr. Sterling (or any available liver transplant surgeon).   Will need to follow up with ATC nurse on same day for dialysis line care.    2. Follow up with Transplant Hepatology in 3 months.     3. Abdominal x-ray 3 months post-transplant to evaluate for biliary stent.    Call your Transplant Coordinator ([phone]) with questions about Transplant Center appointment scheduling.     LABS:     CBC, BMP, magnesium, phosphorus, hepatic panel, tacrolimus level to be drawn every Monday, Wednesday, and Friday beginning 3/24 by home health care nurse if arranged, or at an outpatient lab to monitor for renal recovery.     Diet    Follow this diet upon discharge: Low potassium diet     Discharge Medications   Current Discharge Medication List      START taking these medications    Details   fludrocortisone (FLORINEF) 0.1 MG tablet Take 1 tablet (0.1 mg) by mouth daily  Qty: 30 tablet, Refills: 1    Associated Diagnoses: Hyperkalemia      furosemide (LASIX) 40 MG tablet Take 1 tablet (40 mg) by mouth daily  Qty: 30 tablet, Refills: 1    Associated Diagnoses: Hyperkalemia         CONTINUE these medications which have NOT CHANGED    Details   acetaminophen (TYLENOL) 325 MG tablet 2 tablets (650 mg) by Oral or Feeding Tube route every 8 hours as needed for mild pain or fever  Qty: 100 tablet, Refills: 0    Associated Diagnoses: Liver replaced by transplant (H)      aspirin (ASA) 325 MG tablet 1 tablet (325 mg) by Per Feeding Tube route daily for 30 days  Qty: 30 tablet, Refills: 2    Associated Diagnoses: Liver replaced by transplant (H)      FLUoxetine (PROZAC) 20 MG capsule Take 3 capsules (60 mg) by mouth At Bedtime  Qty: 90 capsule, Refills: 0    Associated Diagnoses: Anxiety and depression      insulin aspart (NOVOLOG FLEXPEN) 100 UNIT/ML pen 1 unit per 10 grams of carb, plus additional units based on following scale   For Pre-Meal   - 164 give 1 unit. For Pre-Meal  - 189 give 2 units. For Pre-Meal  - 214 give 3 units. For Pre-Meal  - 239 give 4 units. For Pre-Meal  - 264 give 5 units. For Pre-Meal  - 289 give 6 units. For Pre-Meal  - 314 give 7 units. For Pre-Meal  - 339 give 8 units. For Pre-Meal  - 364 give 9 units.  For Pre-Meal BG greater than or equal to 365 give 10 units  Qty: 15 mL, Refills: 3    Associated Diagnoses: Diabetes mellitus type 2 in obese (H)      insulin glargine (LANTUS PEN) 100 UNIT/ML pen Inject 15 Units Subcutaneous every morning AND 10 Units At Bedtime.  Qty: 15 mL, Refills: 0    Comments: If Lantus is not covered by insurance, may substitute Basaglar or Semglee or other insulin glargine product per insurance preference at same dose and frequency.    Associated Diagnoses: Diabetes mellitus type 2 in obese (H)      Lidocaine (LIDOCARE) 4 % Patch Place 2 patches onto the skin every 24 hours To prevent lidocaine toxicity, patient should be patch free for 12 hrs daily.  Qty: 15 patch, Refills: 0    Associated Diagnoses: Liver replaced by transplant (H)      magnesium oxide (MAG-OX) 400 MG tablet Take 1 tablet (400 mg) by mouth every 24 hours  Qty: 30 tablet, Refills: 0    Associated Diagnoses: Liver replaced by transplant (H)      methocarbamol (ROBAXIN) 500 MG tablet Take 1 tablet (500 mg) by mouth 3 times daily as needed for muscle spasms  Qty: 15 tablet, Refills: 0    Associated Diagnoses: Liver replaced by transplant (H)      metoprolol tartrate (LOPRESSOR) 25 MG tablet Take 1 tablet (25 mg) by mouth 2 times daily  Qty: 60 tablet, Refills: 1    Associated Diagnoses: Renovascular hypertension      Multiple Vitamins-Minerals (MULTIVITAMIN GUMMIES ADULT) CHEW Take 2 each by mouth daily      multivitamin RENAL (RENAVITE RX/NEPHROVITE) 1 MG tablet Take 1 tablet by mouth daily  Qty: 30 tablet, Refills: 1    Associated Diagnoses: Liver replaced by transplant (H)       mycophenolate (GENERIC EQUIVALENT) 250 MG capsule Take 3 capsules (750 mg) by mouth 2 times daily  Qty: 180 capsule, Refills: 1    Associated Diagnoses: Liver replaced by transplant (H)      ondansetron (ZOFRAN ODT) 4 MG ODT tab Take 1 tablet (4 mg) by mouth every 6 hours as needed for nausea or vomiting  Qty: 10 tablet, Refills: 0    Associated Diagnoses: Liver replaced by transplant (H)      pantoprazole (PROTONIX) 40 MG EC tablet Take 1 tablet (40 mg) by mouth 2 times daily  Qty: 60 tablet, Refills: 0    Associated Diagnoses: Liver replaced by transplant (H)      predniSONE (DELTASONE) 5 MG tablet Take 1 tablet (5 mg) by mouth daily  Qty: 30 tablet, Refills: 0    Associated Diagnoses: Liver replaced by transplant (H)      sulfamethoxazole-trimethoprim (BACTRIM) 400-80 MG tablet 1 tablet by Oral or Feeding Tube route three times a week Increase as instructed per transplant team  Qty: 30 tablet, Refills: 1    Associated Diagnoses: Liver replaced by transplant (H)      tacrolimus (GENERIC EQUIVALENT) 0.5 MG capsule Take 1 capsule (0.5 mg) by mouth 2 times daily Take 1 cap by mouth twice daily as directed by Transplant Center for dose changes.  Qty: 60 capsule, Refills: 0    Associated Diagnoses: Liver replaced by transplant (H)      tacrolimus (GENERIC EQUIVALENT) 1 MG capsule Take 2 capsules (2 mg) by mouth 2 times daily  Qty: 120 capsule, Refills: 1    Associated Diagnoses: Alcoholic cirrhosis of liver with ascites (H)      thiamine (B-1) 100 MG tablet Take 1 tablet (100 mg) by mouth in the morning.  Qty: 30 tablet, Refills: 0    Associated Diagnoses: Alcoholic cirrhosis of liver with ascites (H)      ursodiol (ACTIGALL) 300 MG capsule Take 1 capsule (300 mg) by mouth 2 times daily  Qty: 60 capsule, Refills: 1    Associated Diagnoses: Liver replaced by transplant (H)      valGANciclovir (VALCYTE) 450 MG tablet Take 1 tablet (450 mg) by mouth twice a week Increase to 450mg daily when directed by transplant team  with improving renal function  Qty: 30 tablet, Refills: 2    Comments: Take Tuesday and Saturday  Associated Diagnoses: Alcoholic cirrhosis of liver with ascites (H)      Vitamin D3 (CHOLECALCIFEROL) 25 mcg (1000 units) tablet 1 tablet (25 mcg) by Oral or Feeding Tube route daily  Qty: 30 tablet, Refills: 1    Associated Diagnoses: Acute renal failure, unspecified acute renal failure type (H)           Allergies   No Known Allergies  Data   Most Recent 3 CBC's:Recent Labs   Lab Test 03/22/23  0908 03/20/23  1001 03/19/23  0740   WBC 8.3 7.6 8.0   HGB 9.6* 9.3* 9.3*   * 102* 103*   * 377 361      Most Recent 3 BMP's:  Recent Labs   Lab Test 03/22/23  1052 03/22/23  0908 03/20/23  1001 03/19/23  0805 03/19/23  0740   NA  --  133* 132*  --  129*   POTASSIUM  --  5.6* 5.4*  --  4.9   CHLORIDE  --  96* 94*  --  95*   CO2  --  26 26  --  23   BUN  --  60.2* 39.1*  --  59.9*   CR  --  1.76* 1.86*  --  2.58*   ANIONGAP  --  11 12  --  11   SARTHAK  --  9.9 9.7  --  9.5   GLC 95 81 90   < > 129*    < > = values in this interval not displayed.     Most Recent 2 LFT's:  Recent Labs   Lab Test 03/22/23  0908 03/20/23  1001   AST 21 33   ALT 10 17   ALKPHOS 265* 277*   BILITOTAL 1.1 1.2     Most Recent INR's and Anticoagulation Dosing History:  Anticoagulation Dose History     Recent Dosing and Labs Latest Ref Rng & Units 3/4/2023 3/5/2023 3/6/2023 3/7/2023 3/8/2023 3/9/2023 3/10/2023    INR 0.85 - 1.15 1.32(H) 1.27(H) 1.25(H) 1.10 1.15 1.19(H) 1.11        Most Recent 3 Troponin's:No lab results found.  Most Recent Cholesterol Panel:No lab results found.  Most Recent 6 Bacteria Isolates From Any Culture (See EPIC Reports for Culture Details):No lab results found.  Most Recent TSH, T4 and A1c Labs:  Recent Labs   Lab Test 03/12/23  0006 01/28/23  0309 07/30/22  0400   TSH  --   --  1.09   A1C 5.2   < >  --     < > = values in this interval not displayed.

## 2023-03-22 NOTE — PROGRESS NOTES
HEMODIALYSIS TREATMENT NOTE    Date: 3/22/2023  Time: 1:05 PM    Data:  Pre Wt:  72.9 kg   Desired Wt: 71.9 kg   Post Wt:  71.9 kg   Weight change:1 kg  Ultrafiltration - Post Run Net Total Removed (mL):  1000  Vascular Access Status: patent  Dialyzer Rinse:  Clear  Total Blood Volume Processed:40 Liters  Total Dialysis (Treatment) Time:  2 Hours    Lab:   No    Interventions:  Received on bed, no SOB, No NVD  Pt. Dialyzed for 3.5 hrs via CVC, UF set for 1 L  BFR:400, DFR:600  Dialysate bath: K:2, CA:2.5  Edema on abdomen noted  CVC is clean, dry and intact  CVC lumen locked with heaprin and changed cap guard  Off 1:30 early due to appointment with the Nephrologist  Handoff reports given to CONI Ramos     Plan:    Per renal team

## 2023-03-22 NOTE — H&P
[facility] History and Physical    Dillon Salter MRN# [id number]   Age: 27 year old YOB: 1995     Date of Admission:  3/22/2023    Primary care provider: Gary Rodrigues          Assessment and Plan:   Dillon Salter is a 27 year old male with a past medical history of Asperger's, DM2, and alcoholic cirrhosis c/b esophageal varices and hepatic encephalopathy. He is now s/p  donor liver transplant without stent on 23 with Dr. Sterling. Returned to OR for washout and repair of arterial bleeding on 3/1/23. Patient was discharged home on 3/19, now being readmitted due to cavitary lesion on CXR and inability to be placed for HD.      s/p DDLT 23: POD #22. LFTs WNL. Tbili 1.1. ALK PHOS stable at 265.   - ASA 325mg daily  - Ursodiol 300mg BID     Immunosuppression management:  Induction: Steroid taper and basiliximab x2 per renal sparing protocol completed.   Maintenance:   - mg BID   -Tacrolimus: 2.5 mg BID; goal 6-8 for renal sparing. Last level 3.19 7.6 (12 hour trough).   -Prednisone 5mg daily while CNI subtherapeutic      Neuro/Psych:  Acute post op pain in the setting of chronic musculoskeletal + neuropathic pain pain: On oxycodone PTA for neuropathy since ~ 2022. Weaned off Oxy post transplant.   - Tylenol prn  - Robaxin 500mg q8h PRN (pt states this makes him somnolent)   - Lidocaine patch   MDD, Anxiety, Autism spectrum: PTA fluoxetine.     Hematology:   Anemia of chronic disease/Acute blood loss: Hgb stable ~9      Cardiorespiratory: Stable on room air.     GI/Nutrition:   Severe malnutrition in the context of acute illness: Regular diet with supplements      Endocrine:   DM2; Steroid induced hyperglycemia: Lantus 15 units every morning and 10 units every evening + carb coverage + sliding scale insulin PRN.        Fluid/Electrolytes/Neph:   ASHISH: Present prior to transplant (prerenal-hypovolemia 2/2 blood loss) now exacerbated by liver  transplant, shock. CRRT started 3/1/23 -- > transitioned to iHD 3/4. Tunneled line placed by IR on 3/10.   * HD per nephrology, plan for MWF HD-[facility]. Due to CXR with cavitary lesion unable to initiate outpatient HD. Consult nephrology for inpatient HD today.   Hypervolemia/hyponatremia: Volume removal with HD.   Hyperkalemia: K 5.6, HD today.      : Oliguric, lowered tacrolimus goal on 3/17. Monitor for renal recovery     ID:   Cavitary lung lesion: Noted on CXR. Quant gold negative on 22. Will obtain Chest CT and consider ID consult.   EBV IgG Ab Discordance (D+/R-):  EBV PCR monthly until 12 months post transplant, then  every 3 months until 2 years post transplant. First check ~3/28.     Prophylaxis: DVT (mechanical), fall, GI (PPI), viral (Valcyte x 3 months), pneumocystis (Bactrim -restarted 3/6)          Chief Complaint:   No complaints. Presents for HD and cavitary lung lesion work up.     History is obtained from the patient    Dillon Salter is a 27 year old male with a past medical history of Asperger's, DM2, and alcoholic cirrhosis c/b esophageal varices and hepatic encephalopathy. He is now s/p  donor liver transplant without stent on 23 with Dr. Sterling. Returned to OR for washout and repair of arterial bleeding on 3/1/23. Patient was discharged home on 3/19, now being readmitted due to cavitary lesion on CXR and inability to be placed for HD.     Patient denies SOB, CP, night sweats, fever, headache and diarrhea. Patient complaints of abdominal pain at incision.          Past Medical History:     Past Medical History:   Diagnosis Date     Acute on chronic anemia 2022     Alcohol use disorder      Alcohol use disorder, severe, dependence (H) 2022     Alcoholic cirrhosis of liver with ascites (H)      Alcoholic hepatitis      Autism spectrum disorder 2022    High functioning autistic.  26-year-old history of autism/Asperger's on the spectrum graduated high  school at [employer] worked at  and then another  place until the Covid epidemic in 2020 lives with parents (Leticia and Wes) socially isolated drinks alcohol beer daily      Benign essential hypertension      Bleeding esophageal varices (H) 06/18/2022     Hepatic encephalopathy      Hyponatremia 07/28/2022     Major depressive disorder      Type II Diabetes             Past Surgical History:     Past Surgical History:   Procedure Laterality Date     BENCH LIVER  2/28/2023    Procedure: Bench liver;  Surgeon: Thelma Sterling MD;  Location: [facility]     COLONOSCOPY N/A 1/27/2023    Procedure: Colonoscopy;  Surgeon: Osman Montoya MD;  [facility]     ENDOSCOPIC ULTRASOUND LOWER GASTROINTESTIONAL TRACT (GI) N/A 1/27/2023    Procedure: ULTRASOUND, LOWER GI TRACT, ENDOSCOPIC with glueing;  Surgeon: Osman Montoya MD;  Location: [facility]     ESOPHAGOSCOPY, GASTROSCOPY, DUODENOSCOPY (EGD), COMBINED N/A 5/24/2022    Procedure: ESOPHAGOGASTRODUODENOSCOPY (EGD) with esophageal banding;  Surgeon: Gary Hurst MD;  Location: [facility]     ESOPHAGOSCOPY, GASTROSCOPY, DUODENOSCOPY (EGD), COMBINED N/A 6/20/2022    Procedure: ESOPHAGOGASTRODUODENOSCOPY;  Surgeon: Gavino Reza MD;  Location: [facility]     ESOPHAGOSCOPY, GASTROSCOPY, DUODENOSCOPY (EGD), COMBINED N/A 1/23/2023    Procedure: ESOPHAGOGASTRODUODENOSCOPY (EGD) with esophageal variceal banding;  Surgeon: Juan Boo MD;  Location: [facility]     ESOPHAGOSCOPY, GASTROSCOPY, DUODENOSCOPY (EGD), COMBINED N/A 1/25/2023    Procedure: ESOPHAGOGASTRODUODENOSCOPY (EGD);  Surgeon: Juan Boo MD;  Location: [facility]     ESOPHAGOSCOPY, GASTROSCOPY, DUODENOSCOPY (EGD), COMBINED N/A 1/27/2023    Procedure: Esophagoscopy, gastroscopy, duodenoscopy (EGD), combined;  Surgeon: Osman Montoya MD;  [facility]     ESOPHAGOSCOPY, GASTROSCOPY, DUODENOSCOPY (EGD), COMBINED N/A 2/12/2023    Procedure:  ESOPHAGOGASTRODUODENOSCOPY (EGD);  Surgeon: Leventhal, Thomas Michael, MD;  Location: [facility] OR     IR CVC TUNNEL PLACEMENT > 5 YRS OF AGE  3/10/2023     MIDLINE DOUBLE LUMEN PLACEMENT  2022          PICC TRIPLE LUMEN PLACEMENT  2022          RETURN LIVER TRANSPLANT N/A 3/1/2023    Procedure: RETURN TO OPERATING ROOM, AFTER LIVER TRANSPLANT;  Surgeon: Thelma Sterling MD;  Location: [facility]     TRANSPLANT LIVER RECIPIENT  DONOR N/A 2023    Procedure: Transplant liver recipient  donor;  Surgeon: Thelma Sterling MD;  Location: [facility]            Social History:     Social History     Tobacco Use     Smoking status: Former     Smokeless tobacco: Never     Tobacco comments:     A pack lasted a year, hardly smoked   Substance Use Topics     Alcohol use: Not Currently     Comment: No ETOH since 22            Family History:     Family History   Problem Relation Age of Onset     Hypertension Mother      Substance Abuse Mother      Thyroid Disease Mother      Heart Failure Father      Substance Abuse Father      Chronic Obstructive Pulmonary Disease Father      Substance Abuse Maternal Grandfather      Family history reviewed and updated in [facility]         Immunizations:   Immunization status is unknown         Allergies:   No Known Allergies         Medications:     Medications Prior to Admission   Medication Sig Dispense Refill Last Dose     acetaminophen (TYLENOL) 325 MG tablet 2 tablets (650 mg) by Oral or Feeding Tube route every 8 hours as needed for mild pain or fever 100 tablet 0      aspirin (ASA) 325 MG tablet 1 tablet (325 mg) by Per Feeding Tube route daily for 30 days 30 tablet 2      FLUoxetine (PROZAC) 20 MG capsule Take 3 capsules (60 mg) by mouth At Bedtime 90 capsule 0      insulin aspart (NOVOLOG FLEXPEN) 100 UNIT/ML pen 1 unit per 10 grams of carb, plus additional units based on following scale   For Pre-Meal  - 164 give 1 unit. For Pre-Meal  - 189 give 2 units. For  Pre-Meal  - 214 give 3 units. For Pre-Meal  - 239 give 4 units. For Pre-Meal  - 264 give 5 units. For Pre-Meal  - 289 give 6 units. For Pre-Meal  - 314 give 7 units. For Pre-Meal  - 339 give 8 units. For Pre-Meal  - 364 give 9 units.  For Pre-Meal BG greater than or equal to 365 give 10 units (Patient taking differently: 1-10 Units 1 unit per 10 grams of carb, plus additional units based on following scale   For Pre-Meal  - 164 give 1 unit. For Pre-Meal  - 189 give 2 units. For Pre-Meal  - 214 give 3 units. For Pre-Meal  - 239 give 4 units. For Pre-Meal  - 264 give 5 units. For Pre-Meal  - 289 give 6 units. For Pre-Meal  - 314 give 7 units. For Pre-Meal  - 339 give 8 units. For Pre-Meal  - 364 give 9 units.  For Pre-Meal BG greater than or equal to 365 give 10 units) 15 mL 3      insulin glargine (LANTUS PEN) 100 UNIT/ML pen Inject 15 Units Subcutaneous every morning AND 10 Units At Bedtime. 15 mL 0      Lidocaine (LIDOCARE) 4 % Patch Place 2 patches onto the skin every 24 hours To prevent lidocaine toxicity, patient should be patch free for 12 hrs daily. 15 patch 0      magnesium oxide (MAG-OX) 400 MG tablet Take 1 tablet (400 mg) by mouth every 24 hours 30 tablet 0      methocarbamol (ROBAXIN) 500 MG tablet Take 1 tablet (500 mg) by mouth 3 times daily as needed for muscle spasms 15 tablet 0      metoprolol tartrate (LOPRESSOR) 25 MG tablet Take 1 tablet (25 mg) by mouth 2 times daily 60 tablet 1      Multiple Vitamins-Minerals (MULTIVITAMIN GUMMIES ADULT) CHEW Take 2 each by mouth daily        multivitamin RENAL (RENAVITE RX/NEPHROVITE) 1 MG tablet Take 1 tablet by mouth daily 30 tablet 1      mycophenolate (GENERIC EQUIVALENT) 250 MG capsule Take 3 capsules (750 mg) by mouth 2 times daily 180 capsule 1      ondansetron (ZOFRAN ODT) 4 MG ODT tab Take 1 tablet (4 mg) by mouth every 6 hours as needed for nausea or  vomiting 10 tablet 0      pantoprazole (PROTONIX) 40 MG EC tablet Take 1 tablet (40 mg) by mouth 2 times daily 60 tablet 0      predniSONE (DELTASONE) 5 MG tablet Take 1 tablet (5 mg) by mouth daily 30 tablet 0      sulfamethoxazole-trimethoprim (BACTRIM) 400-80 MG tablet 1 tablet by Oral or Feeding Tube route three times a week Increase as instructed per transplant team 30 tablet 1      tacrolimus (GENERIC EQUIVALENT) 0.5 MG capsule Take 1 capsule (0.5 mg) by mouth 2 times daily Take 1 cap by mouth twice daily as directed by Transplant Center for dose changes. 60 capsule 0      tacrolimus (GENERIC EQUIVALENT) 1 MG capsule Take 2 capsules (2 mg) by mouth 2 times daily 120 capsule 1      thiamine (B-1) 100 MG tablet Take 1 tablet (100 mg) by mouth in the morning. 30 tablet 0      ursodiol (ACTIGALL) 300 MG capsule Take 1 capsule (300 mg) by mouth 2 times daily 60 capsule 1      valGANciclovir (VALCYTE) 450 MG tablet Take 1 tablet (450 mg) by mouth twice a week Increase to 450mg daily when directed by transplant team with improving renal function 30 tablet 2      Vitamin D3 (CHOLECALCIFEROL) 25 mcg (1000 units) tablet 1 tablet (25 mcg) by Oral or Feeding Tube route daily 30 tablet 1              Review of Systems:   The Review of Systems is negative other than noted in the HPI         Physical Exam:   Vitals were reviewed  Temp: 98.1  F (36.7  C)   BP: 134/86     Resp: 20          General Appearance: NAD.   Skin: warm, dry  Heart: RRR  Lungs: Breathing comfortably on ambient air  Abdomen: abdomen soft, non-distended. Incision c/d/i. Resolving eschar above incision site without overlying erythema and purulence.  Extremities: edema: 1+ Mahendra edema bilaterally  Skin: No jaundice.   - RIJ dialysis tunneled line  Neurologic: A&Ox 3         Data:   Labs and Chest CT pending.     Caren Guevara PA-C

## 2023-03-22 NOTE — LETTER
Transition Communication Hand-off for Care Transitions to Next Level of Care Provider    Name: Dillon Salter  : 1995  MRN #: [id number]  Primary Care Provider: Gary Rodrigues     Primary Clinic: [address] [place] [address] Dr  [place] [address]     Reason for Hospitalization:  Cavitary lesion of lung [J98.4]  Admit Date/Time: 3/22/2023  8:34 AM  Discharge Date: 3/22/2023  Payor Source: Payor: [employer] / Plan: [employer] CARE MA / Product Type:          Reason for Communication Hand-off Referral: Other Continuity of cre    Discharge Plan:Home with outpatient management/follow up       Concern for non-adherence with plan of care:  No  Discharge Needs Assessment:  Needs    Flowsheet Row Most Recent Value   Equipment Currently Used at Home none   # of Referrals Placed by CTS External Care Coordination, Homecare  [Dialysis]          Follow-up specialty is recommended: Yes    Follow-up plan:    Future Appointments   Date Time Provider Department Center   3/24/2023  8:45 AM WBTM LAB TMLABR [facility]   3/27/2023  9:45 AM [facility] LAB TMLABR [facility]   3/27/2023 12:30 PM Juan Castro, [facility]   3/27/2023  2:15 PM UC SOT SURG FELLOW 1 [facility]   3/29/2023  9:45 AM [facility] LAB TMLABR [facility]   3/30/2023  1:00 PM Lakeshia Doan, LICSW [facility]   3/30/2023  2:30 PM Josey Whitley, RD [facility]   3/31/2023  9:30 AM [facility] LAB TMLABR [facility]   4/3/2023  9:45 AM [facility] LAB TMLABR [facility]   4/3/2023  1:45 PM UC SOT SURG FELLOW 1 [facility]   2023  9:45 AM [facility]BTM LAB TMLABR [facility]   4/10/2023  9:45 AM [facility]M LAB TMLABR [facility]   4/10/2023  1:45 PM UC SOT SURG FELLOW 1 [facility]   2023  9:45 AM [facility]BTM LAB TMLABR [facility]   2023  9:45 AM [facility]BTM LAB TMLABR [facility]   2023  1:45 PM UC SOT SURG FELLOW 1 [facility]   2023  8:00 AM [facility] CV CT NURSE [facility] O   2023  9:00 AM UUCT4 [facility] O   2023  1:00 PM UCECHCR2 UCCVCV [facility]    4/18/2023  2:00 PM  PFL C UCPFT [facility]   4/20/2023  9:45 AM WBTM LAB TMLABR [facility]   4/20/2023  3:00 PM Monika Harp, NP WIENDO [facility]   4/24/2023  9:45 AM [facility]BTM LAB TMLABR [facility]   4/27/2023  9:45 AM WBTM LAB TMLABR [facility]   5/1/2023  9:45 AM WBTM LAB TMLABR [facility]   5/4/2023  9:45 AM WBTM LAB TMLABR [facility]   5/8/2023  9:45 AM WBTM LAB TMLABR [facility]   5/11/2023  9:45 AM WBTM LAB TMLABR [facility]   7/21/2023  8:15 AM UC LAB UCLABR [facility]   7/21/2023  8:45 AM Sergio Wood MD [facility]       Any outstanding tests or procedures:              Key Recommendations:  Patient discharging today.  Outpatient dialysis canceled as per transplant nephrology patient no longer requiring dialysis.  Request sent to [facility] for HD line care management on Monday 3/27 and Monday 4/3.  Provider team asked for transplant labs to be done MWF.  Appointments updated - labs will be drawn at [facility]    Amparo Lockhart RN    AVS/Discharge Summary is the source of truth; this is a helpful guide for improved communication of patient story

## 2023-03-23 ENCOUNTER — TELEPHONE (OUTPATIENT)
Dept: TRANSPLANT | Facility: CLINIC | Age: 28
End: 2023-03-23

## 2023-03-23 DIAGNOSIS — N17.9 ACUTE RENAL FAILURE, UNSPECIFIED ACUTE RENAL FAILURE TYPE (H): ICD-10-CM

## 2023-03-23 DIAGNOSIS — K70.31 ALCOHOLIC CIRRHOSIS OF LIVER WITH ASCITES (H): Chronic | ICD-10-CM

## 2023-03-23 DIAGNOSIS — Z94.4 LIVER REPLACED BY TRANSPLANT (H): ICD-10-CM

## 2023-03-23 LAB — BACTERIA UR CULT: NO GROWTH

## 2023-03-23 NOTE — TELEPHONE ENCOUNTER
"TRIAGE  - Mom called concerned that incision is oozing \"juice\" at the top of the T where the two incisions meet. Staples were removed late this afternoon. Mom and Dad took sterile gauze and lightly covered it with surgical tape. No pain or redness at site, steri strips intact. No gross blood. Itchy, other wise feels fine.  Mom states pt does have some extra fluid in his system and is taking Lasix.    Reassured mom they were doing the right thing, enc not to itch, cont to monitor. No need for pressure or to seal it. If increased drainage or blood, please call.     Call routed to primary coordinator.       "

## 2023-03-24 ENCOUNTER — LAB (OUTPATIENT)
Dept: LAB | Facility: CLINIC | Age: 28
End: 2023-03-24
Payer: COMMERCIAL

## 2023-03-24 DIAGNOSIS — K70.31 ALCOHOLIC CIRRHOSIS OF LIVER WITH ASCITES (H): Chronic | ICD-10-CM

## 2023-03-24 DIAGNOSIS — Z94.4 LIVER REPLACED BY TRANSPLANT (H): ICD-10-CM

## 2023-03-24 LAB
ALBUMIN SERPL BCG-MCNC: 3.6 G/DL (ref 3.5–5.2)
ALP SERPL-CCNC: 223 U/L (ref 40–129)
ALT SERPL W P-5'-P-CCNC: 14 U/L (ref 10–50)
ANION GAP SERPL CALCULATED.3IONS-SCNC: 12 MMOL/L (ref 7–15)
AST SERPL W P-5'-P-CCNC: 25 U/L (ref 10–50)
BILIRUB DIRECT SERPL-MCNC: 0.55 MG/DL (ref 0–0.3)
BILIRUB SERPL-MCNC: 1 MG/DL
BUN SERPL-MCNC: 55 MG/DL (ref 6–20)
CALCIUM SERPL-MCNC: 9.9 MG/DL (ref 8.6–10)
CHLORIDE SERPL-SCNC: 99 MMOL/L (ref 98–107)
CREAT SERPL-MCNC: 1.41 MG/DL (ref 0.67–1.17)
DEPRECATED HCO3 PLAS-SCNC: 26 MMOL/L (ref 22–29)
ERYTHROCYTE [DISTWIDTH] IN BLOOD BY AUTOMATED COUNT: 17.5 % (ref 10–15)
GFR SERPL CREATININE-BSD FRML MDRD: 69 ML/MIN/1.73M2
GLUCOSE SERPL-MCNC: 135 MG/DL (ref 70–99)
HCT VFR BLD AUTO: 27 % (ref 40–53)
HGB BLD-MCNC: 8.6 G/DL (ref 13.3–17.7)
MAGNESIUM SERPL-MCNC: 2 MG/DL (ref 1.7–2.3)
MCH RBC QN AUTO: 33 PG (ref 26.5–33)
MCHC RBC AUTO-ENTMCNC: 31.9 G/DL (ref 31.5–36.5)
MCV RBC AUTO: 103 FL (ref 78–100)
PHOSPHATE SERPL-MCNC: 5.1 MG/DL (ref 2.5–4.5)
PLATELET # BLD AUTO: 387 10E3/UL (ref 150–450)
POTASSIUM SERPL-SCNC: 5.2 MMOL/L (ref 3.4–5.3)
PROT SERPL-MCNC: 6.3 G/DL (ref 6.4–8.3)
RBC # BLD AUTO: 2.61 10E6/UL (ref 4.4–5.9)
SODIUM SERPL-SCNC: 137 MMOL/L (ref 136–145)
TACROLIMUS BLD-MCNC: 6.3 UG/L (ref 5–15)
TME LAST DOSE: NORMAL H
TME LAST DOSE: NORMAL H
WBC # BLD AUTO: 6.2 10E3/UL (ref 4–11)

## 2023-03-24 PROCEDURE — 80321 ALCOHOLS BIOMARKERS 1OR 2: CPT | Mod: 90

## 2023-03-24 PROCEDURE — 80053 COMPREHEN METABOLIC PANEL: CPT

## 2023-03-24 PROCEDURE — 36415 COLL VENOUS BLD VENIPUNCTURE: CPT

## 2023-03-24 PROCEDURE — 85027 COMPLETE CBC AUTOMATED: CPT

## 2023-03-24 PROCEDURE — 82248 BILIRUBIN DIRECT: CPT

## 2023-03-24 PROCEDURE — 99000 SPECIMEN HANDLING OFFICE-LAB: CPT

## 2023-03-24 PROCEDURE — 80197 ASSAY OF TACROLIMUS: CPT

## 2023-03-24 PROCEDURE — 83735 ASSAY OF MAGNESIUM: CPT

## 2023-03-24 PROCEDURE — 84100 ASSAY OF PHOSPHORUS: CPT

## 2023-03-24 NOTE — TELEPHONE ENCOUNTER
Writer has continued to call all numbers provided for Leticia Carranza and Wes with no response or call back.    Message to Dr Jones as [email or web address].

## 2023-03-24 NOTE — TELEPHONE ENCOUNTER
"Per Dr Jones:    \"Vicky,      So, in case you didn't hear, he came in for CT chest which was negative and his Scr was downtrending from 3/20 to today without dialysis, increasing UOP, so he is recovering. I was really busy and labs not obtained until 9:30AM so by the time I saw them he had gotten 2h of iHD. I stopped HD, asked that he is discharged with florinef 0.1mg daily and lasix 40mg, and I want him to get labs MWF, next labs on Friday 3/24. Please message me with the labs, UOP volume, and weight/swelling so we can make a plan. Thank you!!!\"    Called Dillon/Leticia to check in and ask about UOP, current weight and stat us on swelling. No answer, left  to return call.    Attempted to call patient's father, Wes, no answer. LVM to return call.  "

## 2023-03-26 LAB
PLPETH BLD-MCNC: <10 NG/ML
POPETH BLD-MCNC: <10 NG/ML

## 2023-03-27 ENCOUNTER — LAB (OUTPATIENT)
Dept: LAB | Facility: CLINIC | Age: 28
End: 2023-03-27
Payer: COMMERCIAL

## 2023-03-27 ENCOUNTER — OFFICE VISIT (OUTPATIENT)
Dept: TRANSPLANT | Facility: CLINIC | Age: 28
End: 2023-03-27
Attending: SURGERY
Payer: COMMERCIAL

## 2023-03-27 ENCOUNTER — INFUSION THERAPY VISIT (OUTPATIENT)
Dept: INFUSION THERAPY | Facility: CLINIC | Age: 28
End: 2023-03-27
Attending: PHYSICIAN ASSISTANT
Payer: COMMERCIAL

## 2023-03-27 ENCOUNTER — TELEPHONE (OUTPATIENT)
Dept: TRANSPLANT | Facility: CLINIC | Age: 28
End: 2023-03-27

## 2023-03-27 VITALS
WEIGHT: 160.7 LBS | OXYGEN SATURATION: 100 % | DIASTOLIC BLOOD PRESSURE: 78 MMHG | HEART RATE: 76 BPM | BODY MASS INDEX: 26.74 KG/M2 | SYSTOLIC BLOOD PRESSURE: 125 MMHG

## 2023-03-27 DIAGNOSIS — Z94.4 LIVER REPLACED BY TRANSPLANT (H): ICD-10-CM

## 2023-03-27 DIAGNOSIS — N17.9 ACUTE RENAL FAILURE, UNSPECIFIED ACUTE RENAL FAILURE TYPE (H): Primary | ICD-10-CM

## 2023-03-27 DIAGNOSIS — Z94.4 LIVER REPLACED BY TRANSPLANT (H): Primary | ICD-10-CM

## 2023-03-27 DIAGNOSIS — N17.9 ACUTE RENAL FAILURE, UNSPECIFIED ACUTE RENAL FAILURE TYPE (H): ICD-10-CM

## 2023-03-27 LAB
ALBUMIN SERPL BCG-MCNC: 3.8 G/DL (ref 3.5–5.2)
ALP SERPL-CCNC: 183 U/L (ref 40–129)
ALT SERPL W P-5'-P-CCNC: 16 U/L (ref 10–50)
ANION GAP SERPL CALCULATED.3IONS-SCNC: 11 MMOL/L (ref 7–15)
AST SERPL W P-5'-P-CCNC: 22 U/L (ref 10–50)
BILIRUB DIRECT SERPL-MCNC: 0.43 MG/DL (ref 0–0.3)
BILIRUB SERPL-MCNC: 0.9 MG/DL
BUN SERPL-MCNC: 55.5 MG/DL (ref 6–20)
CALCIUM SERPL-MCNC: 9.9 MG/DL (ref 8.6–10)
CHLORIDE SERPL-SCNC: 101 MMOL/L (ref 98–107)
CREAT SERPL-MCNC: 1.06 MG/DL (ref 0.67–1.17)
DEPRECATED HCO3 PLAS-SCNC: 27 MMOL/L (ref 22–29)
ERYTHROCYTE [DISTWIDTH] IN BLOOD BY AUTOMATED COUNT: 16.9 % (ref 10–15)
GFR SERPL CREATININE-BSD FRML MDRD: >90 ML/MIN/1.73M2
GLUCOSE SERPL-MCNC: 116 MG/DL (ref 70–99)
HCT VFR BLD AUTO: 27.5 % (ref 40–53)
HGB BLD-MCNC: 8.8 G/DL (ref 13.3–17.7)
MAGNESIUM SERPL-MCNC: 1.7 MG/DL (ref 1.7–2.3)
MCH RBC QN AUTO: 32.8 PG (ref 26.5–33)
MCHC RBC AUTO-ENTMCNC: 32 G/DL (ref 31.5–36.5)
MCV RBC AUTO: 103 FL (ref 78–100)
PHOSPHATE SERPL-MCNC: 3.9 MG/DL (ref 2.5–4.5)
PLATELET # BLD AUTO: 327 10E3/UL (ref 150–450)
POTASSIUM SERPL-SCNC: 5.4 MMOL/L (ref 3.4–5.3)
PROT SERPL-MCNC: 6.7 G/DL (ref 6.4–8.3)
RBC # BLD AUTO: 2.68 10E6/UL (ref 4.4–5.9)
SODIUM SERPL-SCNC: 139 MMOL/L (ref 136–145)
WBC # BLD AUTO: 6.4 10E3/UL (ref 4–11)

## 2023-03-27 PROCEDURE — G0463 HOSPITAL OUTPT CLINIC VISIT: HCPCS | Performed by: SURGERY

## 2023-03-27 PROCEDURE — 36415 COLL VENOUS BLD VENIPUNCTURE: CPT

## 2023-03-27 PROCEDURE — 80197 ASSAY OF TACROLIMUS: CPT

## 2023-03-27 PROCEDURE — 85027 COMPLETE CBC AUTOMATED: CPT

## 2023-03-27 PROCEDURE — 99213 OFFICE O/P EST LOW 20 MIN: CPT | Mod: 24 | Performed by: SURGERY

## 2023-03-27 PROCEDURE — 250N000011 HC RX IP 250 OP 636: Performed by: PHYSICIAN ASSISTANT

## 2023-03-27 PROCEDURE — 84100 ASSAY OF PHOSPHORUS: CPT

## 2023-03-27 PROCEDURE — 83735 ASSAY OF MAGNESIUM: CPT

## 2023-03-27 PROCEDURE — 80053 COMPREHEN METABOLIC PANEL: CPT

## 2023-03-27 PROCEDURE — 82248 BILIRUBIN DIRECT: CPT

## 2023-03-27 RX ORDER — HEPARIN SODIUM 1000 [USP'U]/ML
1 INJECTION, SOLUTION INTRAVENOUS; SUBCUTANEOUS EVERY 24 HOURS
Status: CANCELLED
Start: 2023-04-03

## 2023-03-27 RX ORDER — HEPARIN SODIUM 1000 [USP'U]/ML
1 INJECTION, SOLUTION INTRAVENOUS; SUBCUTANEOUS EVERY 24 HOURS
Status: DISCONTINUED | OUTPATIENT
Start: 2023-03-27 | End: 2023-03-27 | Stop reason: HOSPADM

## 2023-03-27 RX ADMIN — HEPARIN SODIUM 4100 UNITS: 1000 INJECTION, SOLUTION INTRAVENOUS; SUBCUTANEOUS at 14:18

## 2023-03-27 NOTE — PROGRESS NOTES
Infusion Nursing Note:  Dillon Salter presents today for Montoya line cares.    Patient seen by provider today: Yes: seeing surgery provider after infusion visit   present during visit today: Not Applicable.    Note: Pt presents for line cares. Current dressing is intact, patient states that line has not been used in awhile.    Intravenous Access:  Montoya.    Treatment Conditions:  Not Applicable.    Post Infusion Assessment:  Blood return noted pre and post infusion.  Site patent and intact, free from redness, edema or discomfort.     Discharge Plan:   Patient and/or family verbalized understanding of discharge instructions and all questions answered.  Patient discharged in stable condition accompanied by: family.    Administrations This Visit     heparin Lock (1000 units/mL High concentration) 1,000 Units     Admin Date  03/27/2023 Action  $Given Dose  4,100 Units Route  Intracatheter Administered By  Nevin Schultz RN Alyssa Marie Sakhitab-Kerestes, RN

## 2023-03-27 NOTE — TELEPHONE ENCOUNTER
----- Message from Anderson Jones MD sent at 3/27/2023 12:37 PM CDT -----  Thanks. How is his swelling? Please arrange for removal of tunneled dialysis line by IR.       Order placed. Call to Dillon/Leticia to ask about swelling and let them know HD catheter can be removed. No response. Call to patient's father, Wes, no response. LVM to return call. Message to Dr Jones as CHASIDY.

## 2023-03-27 NOTE — PATIENT INSTRUCTIONS
Dear Dillon Salter    Thank you for choosing [facility] ([facility]) for your line cares.  The following information is a summary of our appointment as well as important reminders.      We look forward in seeing you on your next appointment here at [facility] ([facility]).  Please don t hesitate to call us at [phone] to reschedule any of your appointments or to speak with one of the [facility] registered nurses.  It was a pleasure taking care of you today.    Sincerely,    [facility]  [facility]  [address] [place] [address]  [id number]  Phone:  [phone]

## 2023-03-27 NOTE — PROGRESS NOTES
Transplant Surgery Progress Note    Transplants:  2/28/2023 (Liver); Postoperative day:  27  S: Doing well post-operatively. Urinating well at this point, not dialysis dependent and renal function improving, no fevers/chills. Feels as though his appetite has gradually been improving. Working towards 3-4 ensures daily. Does complaining of some serous drainage from midline, nonpurulent, no erythema, no fevers/chills.      Transplant History:    Transplant Type:  Liver Tx  Donor Type:    Transplant Date:  2/28/2023 (Liver)     Acute Rejection Hx:  No    Present Maintenance Immunosuppression:  Tacrolimus 2mg bid, 5mg prednisone daily and Mycophenolate mofetil 750 bid    CMV IgG Ab Discordance:  No  EBV IgG Ab Discordance:  Yes    BK Viremia: n/a  EBV Viremia:  No    Transplant Coordinator: Vicky Castro     Transplant Office Phone Number: [phone]     Immunosuppressant Medications     Immunosuppressive Agents Disp Start End     mycophenolate (GENERIC EQUIVALENT) 250 MG capsule    180 capsule 3/19/2023     Sig - Route: Take 3 capsules (750 mg) by mouth 2 times daily - Oral    Class: E-Prescribe     tacrolimus (GENERIC EQUIVALENT) 0.5 MG capsule    60 capsule 3/19/2023     Sig - Route: Take 1 capsule (0.5 mg) by mouth 2 times daily Take 1 cap by mouth twice daily as directed by Transplant Center for dose changes. - Oral    Class: E-Prescribe     tacrolimus (GENERIC EQUIVALENT) 1 MG capsule    120 capsule 3/19/2023     Sig - Route: Take 2 capsules (2 mg) by mouth 2 times daily - Oral    Class: E-Prescribe    Renewals     Renewal requests to authorizing provider (Julieth Leblanc NP) <b>prohibited</b>                Possible Immunosuppression-related side effects:   []             headache  []             vivid dreams  []             irritability  []             cognitive difficuties  []             fine tremor  []             nausea  []             diarrhea  []             neuropathy      []              edema  []             renal calcineurin toxicity  []             hyperkalemia  []             post-transplant diabetes  []             decreased appetite  []             increased appetite  []             other:  []             none    Prescription Medications as of 3/27/2023       Rx Number Disp Refills Start End Last Dispensed Date Next Fill Date Owning Pharmacy    acetaminophen (TYLENOL) 325 MG tablet  100 tablet 0 3/19/2023    [address]    Si tablets (650 mg) by Oral or Feeding Tube route every 8 hours as needed for mild pain or fever    Class: E-Prescribe    Route: Oral or Feeding Tube    Renewals     Renewal requests to authorizing provider (Julieth Leblanc NP) <b>prohibited</b>          aspirin (ASA) 325 MG tablet  30 tablet 2 3/18/2023 2023   [address]    Si tablet (325 mg) by Per Feeding Tube route daily for 30 days    Class: E-Prescribe    Route: Per Feeding Tube    Renewals     Renewal requests to authorizing provider (Julieth Leblanc NP) <b>prohibited</b>          fludrocortisone (FLORINEF) 0.1 MG tablet  30 tablet 1 3/22/2023    [address]    Sig: Take 1 tablet (0.1 mg) by mouth daily    Class: E-Prescribe    Route: Oral    FLUoxetine (PROZAC) 20 MG capsule  90 capsule 0 3/17/2023    [address]    Sig: Take 3 capsules (60 mg) by mouth At Bedtime    Class: E-Prescribe    Route: Oral    Renewals     Renewal requests to authorizing provider (Julieth Leblanc NP) <b>prohibited</b>          furosemide (LASIX) 40 MG tablet  30 tablet 1 3/22/2023    [address]    Sig: Take 1 tablet (40 mg) by mouth daily    Class: E-Prescribe    Route: Oral    insulin aspart (NOVOLOG FLEXPEN) 100 UNIT/ML  pen  15 mL 3 2022    [id number] [place] [id number] AT [facility]    Si unit per 10 grams of carb, plus additional units based on following scale   For Pre-Meal  - 164 give 1 unit. For Pre-Meal  - 189 give 2 units. For Pre-Meal  - 214 give 3 units. For Pre-Meal  - 239 give 4 units. For Pre-Meal  - 264 give 5 units. For Pre-Meal  - 289 give 6 units. For Pre-Meal  - 314 give 7 units. For Pre-Meal  - 339 give 8 units. For Pre-Meal  - 364 give 9 units.  For Pre-Meal BG greater than or equal to 365 give 10 units    Class: No Print Out    insulin glargine (LANTUS PEN) 100 UNIT/ML pen  15 mL 0 3/19/2023    [address]    Sig: Inject 15 Units Subcutaneous every morning AND 10 Units At Bedtime.    Class: E-Prescribe    Notes to Pharmacy: If Lantus is not covered by insurance, may substitute Basaglar or Semglee or other insulin glargine product per insurance preference at same dose and frequency.      Route: Subcutaneous    Renewals     Renewal requests to authorizing provider (Julieth Leblanc NP) <b>prohibited</b>          Lidocaine (LIDOCARE) 4 % Patch  15 patch 0 3/19/2023    [address]    Sig: Place 2 patches onto the skin every 24 hours To prevent lidocaine toxicity, patient should be patch free for 12 hrs daily.    Class: E-Prescribe    Route: Transdermal    magnesium oxide (MAG-OX) 400 MG tablet  30 tablet 0 3/19/2023    [address]    Sig: Take 1 tablet (400 mg) by mouth every 24 hours    Class: E-Prescribe    Route: Oral    Renewals     Renewal requests to authorizing provider (Julieth Leblanc NP) <b>prohibited</b>          methocarbamol (ROBAXIN) 500 MG tablet  15 tablet 0 3/19/2023    [facility]  [address]    Sig: Take 1 tablet (500 mg) by mouth 3 times daily as needed for muscle spasms    Class: E-Prescribe    Route: Oral    metoprolol tartrate (LOPRESSOR) 25 MG tablet  60 tablet 1 3/17/2023    [address]    Sig: Take 1 tablet (25 mg) by mouth 2 times daily    Class: E-Prescribe    Route: Oral    Renewals     Renewal requests to authorizing provider (Julieth Leblanc NP) <b>prohibited</b>          Multiple Vitamins-Minerals (MULTIVITAMIN GUMMIES ADULT) CHEW            Sig: Take 2 each by mouth daily    Class: Historical    Route: Oral    multivitamin RENAL (RENAVITE RX/NEPHROVITE) 1 MG tablet  30 tablet 1 3/18/2023    [address]    Sig: Take 1 tablet by mouth daily    Class: E-Prescribe    Route: Oral    mycophenolate (GENERIC EQUIVALENT) 250 MG capsule  180 capsule 1 3/19/2023    [address]    Sig: Take 3 capsules (750 mg) by mouth 2 times daily    Class: E-Prescribe    Route: Oral    ondansetron (ZOFRAN ODT) 4 MG ODT tab  10 tablet 0 3/19/2023    [address]    Sig: Take 1 tablet (4 mg) by mouth every 6 hours as needed for nausea or vomiting    Class: E-Prescribe    Route: Oral    Renewals     Renewal requests to authorizing provider (Julieth Leblanc NP) <b>prohibited</b>          pantoprazole (PROTONIX) 40 MG EC tablet  60 tablet 0 3/19/2023    [address]    Sig: Take 1 tablet (40 mg) by mouth 2 times daily    Class: E-Prescribe    Route: Oral    Renewals     Renewal requests to authorizing provider (Julieth Leblanc NP) <b>prohibited</b>          predniSONE (DELTASONE) 5 MG tablet  30 tablet 0 3/18/2023    [address]  [address]     Sig: Take 1 tablet (5 mg) by mouth daily    Class: E-Prescribe    Route: Oral    Renewals     Renewal requests to authorizing provider (Julieth Leblanc NP) <b>prohibited</b>          sulfamethoxazole-trimethoprim (BACTRIM) 400-80 MG tablet  30 tablet 1 3/17/2023    [address]    Si tablet by Oral or Feeding Tube route three times a week Increase as instructed per transplant team    Class: E-Prescribe    Route: Oral or Feeding Tube    Renewals     Renewal requests to authorizing provider (Julieth Leblanc NP) <b>prohibited</b>          tacrolimus (GENERIC EQUIVALENT) 0.5 MG capsule  60 capsule 0 3/19/2023    [address]    Sig: Take 1 capsule (0.5 mg) by mouth 2 times daily Take 1 cap by mouth twice daily as directed by Transplant Center for dose changes.    Class: E-Prescribe    Route: Oral    tacrolimus (GENERIC EQUIVALENT) 1 MG capsule  120 capsule 1 3/19/2023    [address]    Sig: Take 2 capsules (2 mg) by mouth 2 times daily    Class: E-Prescribe    Route: Oral    Renewals     Renewal requests to authorizing provider (Julieth Leblanc NP) <b>prohibited</b>          thiamine (B-1) 100 MG tablet  30 tablet 0 2022    [facility][id number] [address]  AT [facility]    Sig: Take 1 tablet (100 mg) by mouth in the morning.    Class: E-Prescribe    Route: Oral    ursodiol (ACTIGALL) 300 MG capsule  60 capsule 1 3/17/2023    [address]    Sig: Take 1 capsule (300 mg) by mouth 2 times daily    Class: E-Prescribe    Route: Oral    Renewals     Renewal requests to authorizing provider (Julieth Leblanc NP) <b>prohibited</b>          valGANciclovir (VALCYTE) 450 MG tablet  30 tablet 2 3/20/2023    [facility] - [address]    Sig: Take 1 tablet (450 mg) by mouth twice a week Increase to 450mg daily when directed by transplant team with improving renal function    Class: E-Prescribe    Notes to Pharmacy: Take Tuesday and Saturday    Route: Oral    Vitamin D3 (CHOLECALCIFEROL) 25 mcg (1000 units) tablet  30 tablet 1 3/18/2023    [facility] [address]    Si tablet (25 mcg) by Oral or Feeding Tube route daily    Class: E-Prescribe    Route: Oral or Feeding Tube      Clinic-Administered Medications as of 3/27/2023       Dose Frequency Start End    heparin Lock (1000 units/mL High concentration) 1,000 Units 1 mL EVERY 24 HOURS 3/27/2023     Admin Instructions: Flush line volume x 2.    Dressing change and line flushes weekly.    Class: E-Prescribe    Route: Intracatheter          O:   Pulse:  [76] 76  BP: (125)/(78) 125/78  SpO2:  [100 %] 100 %  General Appearance: in no apparent distress.   Skin: Normal, no rashes or jaundice  Lungs: easy respirations, no audible wheezing.  Abdomen: soft, nontender, nondistended, scabbing throughout abdomen, incision healing well, serous drainage from midline.   Extremities: Tremor present bilateral.     Transplant Immunosuppression Labs Latest Ref Rng & Units 3/27/2023 3/24/2023 3/22/2023 3/20/2023 3/19/2023   Creat 0.67 - 1.17 mg/dL - 1.41(H) 1.76(H) 1.86(H) 2.58(H)   UREA NITROGEN 8 - 22 mg/dL - - - - -   UREA NITROGEN (R) 6.0 - 20.0 mg/dL - 55.0(H) 60.2(H) 39.1(H) 59.9(H)   WBC 4.0 - 11.0 10e3/uL 6.4 6.2 8.3 7.6 8.0   Neutrophil % - - 81 - -   ANEU 1.6 - 8.3 10e3/uL - - - - -       Chemistries:   Recent Labs   Lab Test 23  0912   BUN 55.0*   CR 1.41*   GFRESTIMATED 69   *     Lab Results   Component Value Date    A1C 5.2 2023     Recent Labs   Lab Test 23  0912   ALBUMIN 3.6   BILITOTAL 1.0   ALKPHOS 223*   AST 25   ALT 14     Urine Studies:  Recent Labs   Lab Test  03/22/23  1136   COLOR Yellow   APPEARANCE Clear   URINEGLC Negative   URINEBILI Negative   URINEKETONE Negative   SG 1.016   UBLD Negative   URINEPH 5.0   PROTEIN 10*   NITRITE Negative   LEUKEST Negative   RBCU <1   WBCU 0     No lab results found.  Hematology:   Recent Labs   Lab Test 03/27/23  1009 03/24/23  0912 03/22/23  0908   HGB 8.8* 8.6* 9.6*    387 462*   WBC 6.4 6.2 8.3     Coags:   Recent Labs   Lab Test 03/10/23  0603 03/09/23  0134   INR 1.11 1.19*     HLA antibodies:   No results found for: [id number], [id number], [id number], [id number]    Assessment: Dillon Salter is doing well s/p Liver Tx:  Issues we addressed during his visit include: pain control, staple removal ?'s    Plan:    1. Graft function: pending, labs not yet back this afternoon, generally improving   2. Immunosuppression Management: tacro 2mg BID, 5mg prednisone daily Complexity of management:Medium.  Contributing factors: recent transplant       Joshua Cox MD    I have reviewed history, examined patient and discussed plan with the fellow/resident/CHRISTA.  I concur with the findings in this note.    Patient was counseled on complications of incision and short and long term care to minimize infection/hernia risk.    Immunosuppressive regimen, management and long term risks discussed in detail. Changes, when applicable made as per orders.    Total time: 20 min  Counseling time: 10 min

## 2023-03-27 NOTE — LETTER
3/27/2023         RE: Dillon Salter  [address] [facility] [address]        Dear Colleague,    Thank you for referring your patient, Dillon Salter, to the [facility] TRANSPLANT [facility]INIC. Please see a copy of my visit note below.    Transplant Surgery Progress Note    Transplants:  2/28/2023 (Liver); Postoperative day:  27  S: Doing well post-operatively. Urinating well at this point, not dialysis dependent and renal function improving, no fevers/chills. Feels as though his appetite has gradually been improving. Working towards 3-4 ensures daily. Does complaining of some serous drainage from midline, nonpurulent, no erythema, no fevers/chills.      Transplant History:    Transplant Type:  Liver Tx  Donor Type:    Transplant Date:  2/28/2023 (Liver)     Acute Rejection Hx:  No    Present Maintenance Immunosuppression:  Tacrolimus 2mg bid, 5mg prednisone daily and Mycophenolate mofetil 750 bid    CMV IgG Ab Discordance:  No  EBV IgG Ab Discordance:  Yes    BK Viremia: n/a  EBV Viremia:  No    Transplant Coordinator: Vicky Castro     Transplant Office Phone Number: [phone]     Immunosuppressant Medications       Immunosuppressive Agents Disp Start End     mycophenolate (GENERIC EQUIVALENT) 250 MG capsule    180 capsule 3/19/2023     Sig - Route: Take 3 capsules (750 mg) by mouth 2 times daily - Oral    Class: E-Prescribe     tacrolimus (GENERIC EQUIVALENT) 0.5 MG capsule    60 capsule 3/19/2023     Sig - Route: Take 1 capsule (0.5 mg) by mouth 2 times daily Take 1 cap by mouth twice daily as directed by Transplant Center for dose changes. - Oral    Class: E-Prescribe     tacrolimus (GENERIC EQUIVALENT) 1 MG capsule    120 capsule 3/19/2023     Sig - Route: Take 2 capsules (2 mg) by mouth 2 times daily - Oral    Class: E-Prescribe    Renewals       Renewal requests to authorizing provider (Julieth Leblanc NP) <b>prohibited</b>                    Possible Immunosuppression-related  side effects:   []             headache  []             vivid dreams  []             irritability  []             cognitive difficuties  []             fine tremor  []             nausea  []             diarrhea  []             neuropathy      []             edema  []             renal calcineurin toxicity  []             hyperkalemia  []             post-transplant diabetes  []             decreased appetite  []             increased appetite  []             other:  []             none    Prescription Medications as of 3/27/2023         Rx Number Disp Refills Start End Last Dispensed Date Next Fill Date Owning Pharmacy    acetaminophen (TYLENOL) 325 MG tablet  100 tablet 0 3/19/2023    [address]    Si tablets (650 mg) by Oral or Feeding Tube route every 8 hours as needed for mild pain or fever    Class: E-Prescribe    Route: Oral or Feeding Tube    Renewals       Renewal requests to authorizing provider (Julieth Leblanc NP) <b>prohibited</b>            aspirin (ASA) 325 MG tablet  30 tablet 2 3/18/2023 2023   [address]    Si tablet (325 mg) by Per Feeding Tube route daily for 30 days    Class: E-Prescribe    Route: Per Feeding Tube    Renewals       Renewal requests to authorizing provider (Julieth Leblanc NP) <b>prohibited</b>            fludrocortisone (FLORINEF) 0.1 MG tablet  30 tablet 1 3/22/2023    [address]    Sig: Take 1 tablet (0.1 mg) by mouth daily    Class: E-Prescribe    Route: Oral    FLUoxetine (PROZAC) 20 MG capsule  90 capsule 0 3/17/2023    [address]    Sig: Take 3 capsules (60 mg) by mouth At Bedtime    Class: E-Prescribe    Route: Oral    Renewals       Renewal requests to authorizing provider (Julieth Leblanc NP)  <b>prohibited</b>            furosemide (LASIX) 40 MG tablet  30 tablet 1 3/22/2023    [address]    Sig: Take 1 tablet (40 mg) by mouth daily    Class: E-Prescribe    Route: Oral    insulin aspart (NOVOLOG FLEXPEN) 100 UNIT/ML pen  15 mL 3 2022    [id number] [place] [id number] [facility] [place] [facility]    Si unit per 10 grams of carb, plus additional units based on following scale   For Pre-Meal  - 164 give 1 unit. For Pre-Meal  - 189 give 2 units. For Pre-Meal  - 214 give 3 units. For Pre-Meal  - 239 give 4 units. For Pre-Meal  - 264 give 5 units. For Pre-Meal  - 289 give 6 units. For Pre-Meal  - 314 give 7 units. For Pre-Meal  - 339 give 8 units. For Pre-Meal  - 364 give 9 units.  For Pre-Meal BG greater than or equal to 365 give 10 units    Class: No Print Out    insulin glargine (LANTUS PEN) 100 UNIT/ML pen  15 mL 0 3/19/2023    [address]    Sig: Inject 15 Units Subcutaneous every morning AND 10 Units At Bedtime.    Class: E-Prescribe    Notes to Pharmacy: If Lantus is not covered by insurance, may substitute Basaglar or Semglee or other insulin glargine product per insurance preference at same dose and frequency.      Route: Subcutaneous    Renewals       Renewal requests to authorizing provider (Julieth Leblanc NP) <b>prohibited</b>            Lidocaine (LIDOCARE) 4 % Patch  15 patch 0 3/19/2023    [address]    Sig: Place 2 patches onto the skin every 24 hours To prevent lidocaine toxicity, patient should be patch free for 12 hrs daily.    Class: E-Prescribe    Route: Transdermal    magnesium oxide (MAG-OX) 400 MG tablet  30 tablet 0 3/19/2023    [address]    Sig: Take  1 tablet (400 mg) by mouth every 24 hours    Class: E-Prescribe    Route: Oral    Renewals       Renewal requests to authorizing provider (Julieth Leblanc NP) <b>prohibited</b>            methocarbamol (ROBAXIN) 500 MG tablet  15 tablet 0 3/19/2023    [address]    Sig: Take 1 tablet (500 mg) by mouth 3 times daily as needed for muscle spasms    Class: E-Prescribe    Route: Oral    metoprolol tartrate (LOPRESSOR) 25 MG tablet  60 tablet 1 3/17/2023    [address]    Sig: Take 1 tablet (25 mg) by mouth 2 times daily    Class: E-Prescribe    Route: Oral    Renewals       Renewal requests to authorizing provider (Julieth Leblanc NP) <b>prohibited</b>            Multiple Vitamins-Minerals (MULTIVITAMIN GUMMIES ADULT) CHEW            Sig: Take 2 each by mouth daily    Class: Historical    Route: Oral    multivitamin RENAL (RENAVITE RX/NEPHROVITE) 1 MG tablet  30 tablet 1 3/18/2023    [address]    Sig: Take 1 tablet by mouth daily    Class: E-Prescribe    Route: Oral    mycophenolate (GENERIC EQUIVALENT) 250 MG capsule  180 capsule 1 3/19/2023    [address]    Sig: Take 3 capsules (750 mg) by mouth 2 times daily    Class: E-Prescribe    Route: Oral    ondansetron (ZOFRAN ODT) 4 MG ODT tab  10 tablet 0 3/19/2023    [address]    Sig: Take 1 tablet (4 mg) by mouth every 6 hours as needed for nausea or vomiting    Class: E-Prescribe    Route: Oral    Renewals       Renewal requests to authorizing provider (Julieth Leblanc NP) <b>prohibited</b>            pantoprazole (PROTONIX) 40 MG EC tablet  60 tablet 0 3/19/2023    [address]    Sig: Take 1 tablet (40  mg) by mouth 2 times daily    Class: E-Prescribe    Route: Oral    Renewals       Renewal requests to authorizing provider (Julieth Leblanc NP) <b>prohibited</b>            predniSONE (DELTASONE) 5 MG tablet  30 tablet 0 3/18/2023    [address]    Sig: Take 1 tablet (5 mg) by mouth daily    Class: E-Prescribe    Route: Oral    Renewals       Renewal requests to authorizing provider (Julieth Leblanc NP) <b>prohibited</b>            sulfamethoxazole-trimethoprim (BACTRIM) 400-80 MG tablet  30 tablet 1 3/17/2023    [address]    Si tablet by Oral or Feeding Tube route three times a week Increase as instructed per transplant team    Class: E-Prescribe    Route: Oral or Feeding Tube    Renewals       Renewal requests to authorizing provider (Julieth Leblanc NP) <b>prohibited</b>            tacrolimus (GENERIC EQUIVALENT) 0.5 MG capsule  60 capsule 0 3/19/2023    [address]    Sig: Take 1 capsule (0.5 mg) by mouth 2 times daily Take 1 cap by mouth twice daily as directed by Transplant Center for dose changes.    Class: E-Prescribe    Route: Oral    tacrolimus (GENERIC EQUIVALENT) 1 MG capsule  120 capsule 1 3/19/2023    [address]    Sig: Take 2 capsules (2 mg) by mouth 2 times daily    Class: E-Prescribe    Route: Oral    Renewals       Renewal requests to authorizing provider (Julieth Leblanc NP) <b>prohibited</b>            thiamine (B-1) 100 MG tablet  30 tablet 0 2022    [id number] [place] [address] [address] AT [facility]    Sig: Take 1 tablet (100 mg) by mouth in the morning.    Class: E-Prescribe    Route: Oral    ursodiol (ACTIGALL) 300 MG capsule  60 capsule 1 3/17/2023    [facility]  [address]    Sig: Take 1 capsule (300 mg) by mouth 2 times daily    Class: E-Prescribe    Route: Oral    Renewals       Renewal requests to authorizing provider (Julieth Leblanc NP) <b>prohibited</b>            valGANciclovir (VALCYTE) 450 MG tablet  30 tablet 2 3/20/2023    [facility] - [address]    Sig: Take 1 tablet (450 mg) by mouth twice a week Increase to 450mg daily when directed by transplant team with improving renal function    Class: E-Prescribe    Notes to Pharmacy: Take Tuesday and Saturday    Route: Oral    Vitamin D3 (CHOLECALCIFEROL) 25 mcg (1000 units) tablet  30 tablet 1 3/18/2023    [facility] - [address]    Si tablet (25 mcg) by Oral or Feeding Tube route daily    Class: E-Prescribe    Route: Oral or Feeding Tube          Clinic-Administered Medications as of 3/27/2023         Dose Frequency Start End    heparin Lock (1000 units/mL High concentration) 1,000 Units 1 mL EVERY 24 HOURS 3/27/2023     Admin Instructions: Flush line volume x 2.    Dressing change and line flushes weekly.    Class: E-Prescribe    Route: Intracatheter            O:   Pulse:  [76] 76  BP: (125)/(78) 125/78  SpO2:  [100 %] 100 %  General Appearance: in no apparent distress.   Skin: Normal, no rashes or jaundice  Lungs: easy respirations, no audible wheezing.  Abdomen: soft, nontender, nondistended, scabbing throughout abdomen, incision healing well, serous drainage from midline.   Extremities: Tremor present bilateral.     Transplant Immunosuppression Labs Latest Ref Rng & Units 3/27/2023 3/24/2023 3/22/2023 3/20/2023 3/19/2023   Creat 0.67 - 1.17 mg/dL - 1.41(H) 1.76(H) 1.86(H) 2.58(H)   UREA NITROGEN 8 - 22 mg/dL - - - - -   UREA NITROGEN (R) 6.0 - 20.0 mg/dL - 55.0(H) 60.2(H) 39.1(H) 59.9(H)   WBC 4.0 - 11.0 10e3/uL 6.4 6.2 8.3 7.6 8.0   Neutrophil % - - 81 - -   ANEU 1.6 - 8.3 10e3/uL - - -  - -       Chemistries:   Recent Labs   Lab Test 03/24/23  0912   BUN 55.0*   CR 1.41*   GFRESTIMATED 69   *     Lab Results   Component Value Date    A1C 5.2 03/12/2023     Recent Labs   Lab Test 03/24/23  0912   ALBUMIN 3.6   BILITOTAL 1.0   ALKPHOS 223*   AST 25   ALT 14     Urine Studies:  Recent Labs   Lab Test 03/22/23  1136   COLOR Yellow   APPEARANCE Clear   URINEGLC Negative   URINEBILI Negative   URINEKETONE Negative   SG 1.016   UBLD Negative   URINEPH 5.0   PROTEIN 10*   NITRITE Negative   LEUKEST Negative   RBCU <1   WBCU 0     No lab results found.  Hematology:   Recent Labs   Lab Test 03/27/23  1009 03/24/23  0912 03/22/23  0908   HGB 8.8* 8.6* 9.6*    387 462*   WBC 6.4 6.2 8.3     Coags:   Recent Labs   Lab Test 03/10/23  0603 03/09/23  0134   INR 1.11 1.19*     HLA antibodies:   No results found for: NG2WIJQWF, ZG2LXHUFMP, NM6CAEGPQ, [id number]    Assessment: Dillon Salter is doing well s/p Liver Tx:  Issues we addressed during his visit include: pain control, staple removal ?'s    Plan:    1. Graft function: pending, labs not yet back this afternoon, generally improving   2. Immunosuppression Management: tacro 2mg BID, 5mg prednisone daily Complexity of management:Medium.  Contributing factors: recent transplant       Joshua Cox MD    I have reviewed history, examined patient and discussed plan with the fellow/resident/CHRISTA.  I concur with the findings in this note.    Patient was counseled on complications of incision and short and long term care to minimize infection/hernia risk.    Immunosuppressive regimen, management and long term risks discussed in detail. Changes, when applicable made as per orders.    Total time: 20 min  Counseling time: 10 min                   Again, thank you for allowing me to participate in the care of your patient.        Sincerely,        Thelma Sterling MD

## 2023-03-28 ENCOUNTER — TELEPHONE (OUTPATIENT)
Dept: TRANSPLANT | Facility: CLINIC | Age: 28
End: 2023-03-28
Payer: COMMERCIAL

## 2023-03-28 LAB
TACROLIMUS BLD-MCNC: 7.6 UG/L (ref 5–15)
TME LAST DOSE: NORMAL H
TME LAST DOSE: NORMAL H

## 2023-03-28 RX ORDER — MAGNESIUM OXIDE 400 MG/1
400 TABLET ORAL EVERY 24 HOURS
Qty: 90 TABLET | Refills: 3 | Status: SHIPPED | OUTPATIENT
Start: 2023-03-28 | End: 2023-04-11

## 2023-03-28 RX ORDER — ASPIRIN 325 MG
325 TABLET ORAL DAILY
Qty: 30 TABLET | Refills: 3 | Status: SHIPPED | OUTPATIENT
Start: 2023-03-28 | End: 2023-04-14 | Stop reason: DRUGHIGH

## 2023-03-28 RX ORDER — VITAMIN B COMPLEX
25 TABLET ORAL DAILY
Qty: 90 TABLET | Refills: 3 | Status: SHIPPED | OUTPATIENT
Start: 2023-03-28

## 2023-03-28 RX ORDER — VIT B COMP NO.3/FOLIC/C/BIOTIN 1 MG-60 MG
1 TABLET ORAL DAILY
Qty: 90 TABLET | Refills: 3 | Status: SHIPPED | OUTPATIENT
Start: 2023-03-28 | End: 2023-04-27

## 2023-03-28 RX ORDER — MYCOPHENOLATE MOFETIL 250 MG/1
750 CAPSULE ORAL 2 TIMES DAILY
Qty: 540 CAPSULE | Refills: 3 | Status: SHIPPED | OUTPATIENT
Start: 2023-03-28 | End: 2023-07-19

## 2023-03-28 RX ORDER — LANOLIN ALCOHOL/MO/W.PET/CERES
100 CREAM (GRAM) TOPICAL DAILY
Qty: 90 TABLET | Refills: 3 | Status: SHIPPED | OUTPATIENT
Start: 2023-03-28 | End: 2023-04-14

## 2023-03-28 RX ORDER — TACROLIMUS 1 MG/1
2 CAPSULE ORAL 2 TIMES DAILY
Qty: 360 CAPSULE | Refills: 3 | Status: SHIPPED | OUTPATIENT
Start: 2023-03-28 | End: 2023-04-25

## 2023-03-28 RX ORDER — VALGANCICLOVIR 450 MG/1
450 TABLET, FILM COATED ORAL
Qty: 30 TABLET | Refills: 2 | Status: SHIPPED | OUTPATIENT
Start: 2023-03-30 | End: 2023-06-02

## 2023-03-28 RX ORDER — PREDNISONE 5 MG/1
5 TABLET ORAL DAILY
Qty: 90 TABLET | Refills: 1 | Status: SHIPPED | OUTPATIENT
Start: 2023-03-28 | End: 2023-12-08

## 2023-03-28 RX ORDER — SULFAMETHOXAZOLE AND TRIMETHOPRIM 400; 80 MG/1; MG/1
1 TABLET ORAL
Qty: 36 TABLET | Refills: 3 | Status: SHIPPED | OUTPATIENT
Start: 2023-03-29 | End: 2023-04-25

## 2023-03-28 RX ORDER — URSODIOL 300 MG/1
300 CAPSULE ORAL 2 TIMES DAILY
Qty: 180 CAPSULE | Refills: 3 | Status: SHIPPED | OUTPATIENT
Start: 2023-03-28 | End: 2024-06-20

## 2023-03-28 RX ORDER — PANTOPRAZOLE SODIUM 40 MG/1
40 TABLET, DELAYED RELEASE ORAL 2 TIMES DAILY
Qty: 180 TABLET | Refills: 3 | Status: SHIPPED | OUTPATIENT
Start: 2023-03-28 | End: 2024-01-23

## 2023-03-28 NOTE — TELEPHONE ENCOUNTER
Post Discharge Liver Transplant Phone Call (within 1-3 business days post discharge)      Reviewed with the patient the Transplant Center contact information:  Best phone contact is [phone] Monday-Friday from 8am-5pm.   *You may have to leave a message and get a call back.    Good alternative communication method is via Youtego message.    Coordinators are available 8am-5pm M-F, otherwise there will be an on-call RN available 24/7  *If calling after hours, please call [phone] and ask to speak with the on-call Transplant Coordinator    When to contact the Transplant Center:  - Fever > 100.5F  - Dizziness, lightheadedness  - Severe pain  - If you are unable to take your immunosuppression medications.  - Unable to obtain refill on immunosuppressive medications    Medications:  Reviewed medications with patient.   - confirmed pt has supply of meds  - MAR is up to date   - Verify preferred pharmacy in ScreenHits  - Reminded patient to always contact the pharmacy first for refills    Confirmed the patient has an up to date medication card at home and is knowledgeable in updating their med card (or has someone to assist in this).   - Reinforced patient always bring med card to appointment      Labs:  Labs are expected to be drawn on Monday and Thursday until you are told differently by your transplant team.   - confirmed patient's preferred lab and updated EPIC   - Take a copy of your lab letter with to each lab draw   - Lab order sent directly to patient via [employer] or mail.    - Confirmed patient has a copy of lab letter  - Review how to review lab results on [employer] or obtaining and recording lab values in handbook    Vitals:  Encouraged the patient to record the following:  - BP - daily unless light headed  - Temperature - daily  - Weight - daily    Follow Up:    Role of the Transplant Coordinator vs LPN was reviewed. Contact information provided for both.     Reviewed current scheduled follow up appointments with  surgeon.      Reminded the patient to follow up with PCP within 1 -2 weeks for general follow-up and management of diabetes, pain, and blood pressure    STENT REMOVAL - reminded patient that if stent was placed at time of transplant (this is indicated on the check list) this will need to come out 2-3 mos post transplant.  Asked patient to notify transplant coordinator if sees stent in stool.    Has the patient been contacted with initial visit from HOME CARE? Yes  o If not, Transplant Coordinator to be notified to follow up with Home Care  o Pt will use  specialty pharmacy    Apolonia Peters LPN

## 2023-03-28 NOTE — TELEPHONE ENCOUNTER
"Per Dr Jones:    \"Thanks. Stop lasix. I would like to see what his next K is prior to stopping florinef.\"    Spoke to Leticia, she is aware of medication change. Med list updated.   "

## 2023-03-28 NOTE — TELEPHONE ENCOUNTER
"Spoke to Leticia. Overall Dillon is doing well.    She reports swelling has decreased. She states his feet are still a bit swollen and his abdomen but is getting progressively better each day. She reports his UOP has significantly increased. He has been urinating \"once/hour\" and about \"2 hat fulls/day\". Plan for dialysis catheter removal next Tuesday at 2 pm. Message to Dr Jones as CHASIDY.    She reports that he has been standing up/sitting down and ambulating up/down the stairs independently. She states he has been using Robaxin, Tylenol for pain. She also states that he has a \"few Oxycodone tablets\" leftover from before surgery and has been taking 1/2-1 tab for severe breakthrough pain. Instructed Leticia that we are trying to wean Dillon off Oxycodone and do not recommend him taking medication that was meant for pre transplant. I recommended that he try to stick with the Robaxin, Tylenol and Lidocaine patches. She understands.    She reports that she spoke to [facility] today, she will call the RN back tomorrow to initiate home care for Dillon. She will call me to let me know the first appt date/time.    She reports his appetite is not great but he has been eating small meals throughout the day. Informed Leticia that this is normal right after transplant but to continue giving Dillon small meals that are high in protein. He has been drinking plenty of water. Instructed Leticia to have Dillon avoid foods high in K.     Went over lab appts for this week. No further questions at this time.  "

## 2023-03-28 NOTE — TELEPHONE ENCOUNTER
Call to Leticia/Dillon, no response. LVM to return call.    Call to Wes, Dillon's father, which he initially answered. I introduced myself as Dillon's transplant coordinator calling from the transplant office, he did not respond but writer could hear him on the other line and then he hung up. I attempted to return call, no answer, LVM stating that it is very important they return my call ASAP.

## 2023-03-28 NOTE — TELEPHONE ENCOUNTER
Attempted to call Dillon/Leticia again, no response. LVM to return call.    Call to Wes, Dillon's father, no response. LVM to return call.    Sent MyC message as well instructing Dillon to call me back at the transplant office.

## 2023-03-28 NOTE — TELEPHONE ENCOUNTER
34 wife at bedside updated on  progress Called [facility], they have also been trying to reach Dillon and his family to establish home care services without any luck. I provided my direct # and instructed them to call me if they are able to reach him or his family.

## 2023-03-29 ENCOUNTER — LAB (OUTPATIENT)
Dept: LAB | Facility: CLINIC | Age: 28
End: 2023-03-29
Payer: COMMERCIAL

## 2023-03-29 ENCOUNTER — TELEPHONE (OUTPATIENT)
Dept: TRANSPLANT | Facility: CLINIC | Age: 28
End: 2023-03-29

## 2023-03-29 DIAGNOSIS — Z94.4 LIVER REPLACED BY TRANSPLANT (H): ICD-10-CM

## 2023-03-29 LAB
ALBUMIN SERPL BCG-MCNC: 3.9 G/DL (ref 3.5–5.2)
ALP SERPL-CCNC: 171 U/L (ref 40–129)
ALT SERPL W P-5'-P-CCNC: 15 U/L (ref 10–50)
ANION GAP SERPL CALCULATED.3IONS-SCNC: 10 MMOL/L (ref 7–15)
AST SERPL W P-5'-P-CCNC: 19 U/L (ref 10–50)
BILIRUB DIRECT SERPL-MCNC: 0.44 MG/DL (ref 0–0.3)
BILIRUB SERPL-MCNC: 0.9 MG/DL
BUN SERPL-MCNC: 40.7 MG/DL (ref 6–20)
CALCIUM SERPL-MCNC: 9.8 MG/DL (ref 8.6–10)
CHLORIDE SERPL-SCNC: 100 MMOL/L (ref 98–107)
CREAT SERPL-MCNC: 1.04 MG/DL (ref 0.67–1.17)
DEPRECATED HCO3 PLAS-SCNC: 29 MMOL/L (ref 22–29)
ERYTHROCYTE [DISTWIDTH] IN BLOOD BY AUTOMATED COUNT: 16.9 % (ref 10–15)
GFR SERPL CREATININE-BSD FRML MDRD: >90 ML/MIN/1.73M2
GLUCOSE SERPL-MCNC: 210 MG/DL (ref 70–99)
HCT VFR BLD AUTO: 26.2 % (ref 40–53)
HGB BLD-MCNC: 8.4 G/DL (ref 13.3–17.7)
MAGNESIUM SERPL-MCNC: 1.2 MG/DL (ref 1.7–2.3)
MCH RBC QN AUTO: 32.8 PG (ref 26.5–33)
MCHC RBC AUTO-ENTMCNC: 32.1 G/DL (ref 31.5–36.5)
MCV RBC AUTO: 102 FL (ref 78–100)
PHOSPHATE SERPL-MCNC: 3.6 MG/DL (ref 2.5–4.5)
PLATELET # BLD AUTO: 301 10E3/UL (ref 150–450)
POTASSIUM SERPL-SCNC: 5.5 MMOL/L (ref 3.4–5.3)
PROT SERPL-MCNC: 6.6 G/DL (ref 6.4–8.3)
RBC # BLD AUTO: 2.56 10E6/UL (ref 4.4–5.9)
SODIUM SERPL-SCNC: 139 MMOL/L (ref 136–145)
TACROLIMUS BLD-MCNC: 7 UG/L (ref 5–15)
TME LAST DOSE: NORMAL H
TME LAST DOSE: NORMAL H
WBC # BLD AUTO: 6.3 10E3/UL (ref 4–11)

## 2023-03-29 PROCEDURE — 82248 BILIRUBIN DIRECT: CPT

## 2023-03-29 PROCEDURE — 80197 ASSAY OF TACROLIMUS: CPT

## 2023-03-29 PROCEDURE — 80053 COMPREHEN METABOLIC PANEL: CPT

## 2023-03-29 PROCEDURE — 87521 HEPATITIS C PROBE&RVRS TRNSC: CPT | Mod: 90

## 2023-03-29 PROCEDURE — 85027 COMPLETE CBC AUTOMATED: CPT

## 2023-03-29 PROCEDURE — 84100 ASSAY OF PHOSPHORUS: CPT

## 2023-03-29 PROCEDURE — 83735 ASSAY OF MAGNESIUM: CPT

## 2023-03-29 PROCEDURE — 87516 HEPATITIS B DNA AMP PROBE: CPT | Mod: 90

## 2023-03-29 PROCEDURE — 36415 COLL VENOUS BLD VENIPUNCTURE: CPT

## 2023-03-29 PROCEDURE — 87535 HIV-1 PROBE&REVERSE TRNSCRPJ: CPT | Mod: 90

## 2023-03-29 PROCEDURE — 99000 SPECIMEN HANDLING OFFICE-LAB: CPT

## 2023-03-29 NOTE — TELEPHONE ENCOUNTER
Spoke to  from [facility]. They will start home care services next Wednesday, 4/5. Plan for Hosea Byrne RN to touch base with me after services are initiated.

## 2023-03-30 ENCOUNTER — DOCUMENTATION ONLY (OUTPATIENT)
Dept: TRANSPLANT | Facility: CLINIC | Age: 28
End: 2023-03-30

## 2023-03-30 NOTE — PROGRESS NOTES
"Leticia sent MyC message stating:    \"First of all, I accidently forget to give Dillon his Magnesium 400mg  2 days  in a row. My fault, things were getting re-organized and I set them aside. I was reminded when I seen his lab result was low.  That won't happen again. But, I knew you should know. \"    Magnesium 1.2 on lab draw yesterday. Will repeat tomorrow.   "

## 2023-03-31 ENCOUNTER — APPOINTMENT (OUTPATIENT)
Dept: LAB | Facility: CLINIC | Age: 28
End: 2023-03-31
Payer: COMMERCIAL

## 2023-04-01 LAB
HBV DNA SERPL QL NAA+PROBE: NORMAL
HCV RNA SERPL QL NAA+PROBE: NORMAL
HIV1+2 RNA SERPL QL NAA+PROBE: NORMAL

## 2023-04-03 ENCOUNTER — OFFICE VISIT (OUTPATIENT)
Dept: TRANSPLANT | Facility: CLINIC | Age: 28
End: 2023-04-03
Attending: SURGERY
Payer: COMMERCIAL

## 2023-04-03 ENCOUNTER — LAB (OUTPATIENT)
Dept: LAB | Facility: CLINIC | Age: 28
End: 2023-04-03
Payer: COMMERCIAL

## 2023-04-03 ENCOUNTER — INFUSION THERAPY VISIT (OUTPATIENT)
Dept: INFUSION THERAPY | Facility: CLINIC | Age: 28
End: 2023-04-03
Payer: COMMERCIAL

## 2023-04-03 VITALS
BODY MASS INDEX: 26.92 KG/M2 | HEART RATE: 80 BPM | OXYGEN SATURATION: 94 % | SYSTOLIC BLOOD PRESSURE: 136 MMHG | DIASTOLIC BLOOD PRESSURE: 83 MMHG | WEIGHT: 161.8 LBS

## 2023-04-03 DIAGNOSIS — Z94.4 LIVER REPLACED BY TRANSPLANT (H): ICD-10-CM

## 2023-04-03 DIAGNOSIS — N17.9 ACUTE RENAL FAILURE, UNSPECIFIED ACUTE RENAL FAILURE TYPE (H): Primary | ICD-10-CM

## 2023-04-03 LAB
ALBUMIN SERPL BCG-MCNC: 3.5 G/DL (ref 3.5–5.2)
ALP SERPL-CCNC: 140 U/L (ref 40–129)
ALT SERPL W P-5'-P-CCNC: 13 U/L (ref 10–50)
ANION GAP SERPL CALCULATED.3IONS-SCNC: 9 MMOL/L (ref 7–15)
AST SERPL W P-5'-P-CCNC: 23 U/L (ref 10–50)
BILIRUB DIRECT SERPL-MCNC: 0.35 MG/DL (ref 0–0.3)
BILIRUB SERPL-MCNC: 0.7 MG/DL
BUN SERPL-MCNC: 22.7 MG/DL (ref 6–20)
CALCIUM SERPL-MCNC: 9.5 MG/DL (ref 8.6–10)
CHLORIDE SERPL-SCNC: 101 MMOL/L (ref 98–107)
CREAT SERPL-MCNC: 0.92 MG/DL (ref 0.67–1.17)
DEPRECATED HCO3 PLAS-SCNC: 29 MMOL/L (ref 22–29)
ERYTHROCYTE [DISTWIDTH] IN BLOOD BY AUTOMATED COUNT: 16.7 % (ref 10–15)
GFR SERPL CREATININE-BSD FRML MDRD: >90 ML/MIN/1.73M2
GLUCOSE SERPL-MCNC: 101 MG/DL (ref 70–99)
HCT VFR BLD AUTO: 26.1 % (ref 40–53)
HGB BLD-MCNC: 8.5 G/DL (ref 13.3–17.7)
MAGNESIUM SERPL-MCNC: 1.3 MG/DL (ref 1.7–2.3)
MCH RBC QN AUTO: 32.8 PG (ref 26.5–33)
MCHC RBC AUTO-ENTMCNC: 32.6 G/DL (ref 31.5–36.5)
MCV RBC AUTO: 101 FL (ref 78–100)
PHOSPHATE SERPL-MCNC: 3.6 MG/DL (ref 2.5–4.5)
PLATELET # BLD AUTO: 212 10E3/UL (ref 150–450)
POTASSIUM SERPL-SCNC: 4.8 MMOL/L (ref 3.4–5.3)
PROT SERPL-MCNC: 6.3 G/DL (ref 6.4–8.3)
RBC # BLD AUTO: 2.59 10E6/UL (ref 4.4–5.9)
SODIUM SERPL-SCNC: 139 MMOL/L (ref 136–145)
TACROLIMUS BLD-MCNC: 7.4 UG/L (ref 5–15)
TME LAST DOSE: NORMAL H
TME LAST DOSE: NORMAL H
WBC # BLD AUTO: 6.9 10E3/UL (ref 4–11)

## 2023-04-03 PROCEDURE — 83735 ASSAY OF MAGNESIUM: CPT

## 2023-04-03 PROCEDURE — 84100 ASSAY OF PHOSPHORUS: CPT

## 2023-04-03 PROCEDURE — 85027 COMPLETE CBC AUTOMATED: CPT

## 2023-04-03 PROCEDURE — 99213 OFFICE O/P EST LOW 20 MIN: CPT | Mod: 24 | Performed by: SURGERY

## 2023-04-03 PROCEDURE — 82248 BILIRUBIN DIRECT: CPT

## 2023-04-03 PROCEDURE — 250N000011 HC RX IP 250 OP 636: Performed by: PHYSICIAN ASSISTANT

## 2023-04-03 PROCEDURE — 36415 COLL VENOUS BLD VENIPUNCTURE: CPT

## 2023-04-03 PROCEDURE — 80053 COMPREHEN METABOLIC PANEL: CPT

## 2023-04-03 PROCEDURE — G0463 HOSPITAL OUTPT CLINIC VISIT: HCPCS | Performed by: SURGERY

## 2023-04-03 PROCEDURE — 80197 ASSAY OF TACROLIMUS: CPT

## 2023-04-03 RX ORDER — HEPARIN SODIUM 1000 [USP'U]/ML
1 INJECTION, SOLUTION INTRAVENOUS; SUBCUTANEOUS EVERY 24 HOURS
Status: DISCONTINUED | OUTPATIENT
Start: 2023-04-03 | End: 2023-04-03 | Stop reason: HOSPADM

## 2023-04-03 RX ORDER — HEPARIN SODIUM 1000 [USP'U]/ML
1 INJECTION, SOLUTION INTRAVENOUS; SUBCUTANEOUS EVERY 24 HOURS
Status: CANCELLED
Start: 2023-04-10

## 2023-04-03 RX ADMIN — HEPARIN SODIUM 2000 UNITS: 1000 INJECTION, SOLUTION INTRAVENOUS; SUBCUTANEOUS at 13:31

## 2023-04-03 NOTE — PROGRESS NOTES
Pt comes to [facility] for line care. Pt reports that pt is getting line removed tomorrow and so opted to not get dressing changed today since it was clean, dry and intact. Both lumens had positive blood return and then both flushed and locked with high dose heparin. No further [facility] appts made since plan for line to be removed.    Administrations This Visit     heparin Lock (1000 units/mL High concentration) 1,000 Units     Admin Date  04/03/2023 Action  $Given Dose  2,000 Units Route  Intracatheter Administered By  Evy Haskins RN

## 2023-04-03 NOTE — LETTER
4/3/2023         RE: Dillon Salter  [address] [facility] [address]        Dear Colleague,    Thank you for referring your patient, Dillon Salter, to the [facility] TRANSPLANT CLINIC. Please see a copy of my visit note below.    Lab Results   Component Value Date    AST 19 03/29/2023     Lab Results   Component Value Date    ALT 15 03/29/2023     26 yo S/P liver tx  Doing well  Edema resolving  2 areas of serous drainage from wound, dressed with sterile dressing after cleaning  LFT WNL  Tac/Cellcept well tolerated  Hb stable  COntinue current management   No change in immsx  F/U in 1 week with NP    Immunosuppressive regimen, management and long term risks discussed in detail. Changes, when applicable made as per orders.    Total time: 20 min  Counseling time: 10 min

## 2023-04-03 NOTE — PROGRESS NOTES
Lab Results   Component Value Date    AST 19 03/29/2023     Lab Results   Component Value Date    ALT 15 03/29/2023     28 yo S/P liver tx  Doing well  Edema resolving  2 areas of serous drainage from wound, dressed with sterile dressing after cleaning  LFT WNL  Tac/Cellcept well tolerated  Hb stable  COntinue current management   No change in immsx  F/U in 1 week with NP    Immunosuppressive regimen, management and long term risks discussed in detail. Changes, when applicable made as per orders.    Total time: 20 min  Counseling time: 10 min

## 2023-04-04 ENCOUNTER — ANCILLARY PROCEDURE (OUTPATIENT)
Dept: GENERAL RADIOLOGY | Facility: CLINIC | Age: 28
End: 2023-04-04
Attending: INTERNAL MEDICINE
Payer: COMMERCIAL

## 2023-04-04 DIAGNOSIS — Z94.4 LIVER REPLACED BY TRANSPLANT (H): ICD-10-CM

## 2023-04-04 DIAGNOSIS — N17.9 ACUTE RENAL FAILURE, UNSPECIFIED ACUTE RENAL FAILURE TYPE (H): ICD-10-CM

## 2023-04-04 LAB — LOWER EUS: NORMAL

## 2023-04-04 PROCEDURE — 36589 REMOVAL TUNNELED CV CATH: CPT | Performed by: PHYSICIAN ASSISTANT

## 2023-04-04 NOTE — ANESTHESIA POSTPROCEDURE EVALUATION
Patient: Dillon Salter    Procedure: Procedure(s):  Transplant liver recipient  donor  Bench liver       Anesthesia Type:  General    Note:  Disposition: ICU            ICU Sign Out: Anesthesiologist/ICU physician sign out WAS performed   Postop Pain Control: Uneventful            Sign Out: Well controlled pain   PONV: No   Neuro/Psych: Uneventful            Sign Out: Acceptable/Baseline neuro status   Airway/Respiratory: Uneventful            Sign Out: Acceptable/Baseline resp. status   CV/Hemodynamics: Uneventful            Sign Out: Acceptable CV status; No obvious hypovolemia; No obvious fluid overload   Other NRE: NONE   DID A NON-ROUTINE EVENT OCCUR? No           Last vitals:  Vitals Value Taken Time   /83 23 1419   Temp 36.8  C (98.3  F) 23 1230   Pulse 80 23 1419   Resp 15 23 1442   SpO2 94 % 23 1419       Electronically Signed By: Anthony Rodas DO  2023  7:46 AM

## 2023-04-04 NOTE — PROGRESS NOTES
Dillon Salter  [id number]    Completed removal of tunneled catheter via RIGHT chest. Dx: dialysis access; no longer needed. Chaya.

## 2023-04-04 NOTE — DISCHARGE INSTRUCTIONS
A collaboration between [facility] and [facility]  Experts in minimally invasive, targeted treatments performed using imaging guidance    Tunneled Central Venous Catheter Removal    Today you had your existing tunneled central venous catheter removed because it was no longer needed for treatment.    Your catheter was removed by Ivan Larkin PA-C    After you go home:  Avoid lying flat or bending at the waist for 2 hours following removal of the catheter to reduce risk of bleeding from catheter site.  Keep any applied tape/gauze dressings clean and dry. Change tape/gauze dressings if they get wet or soiled.  You may shower following the procedure, however cover and protect from moisture any tape/gauze dressings.   You may remove tape/gauze dressings after 3 days if the site looks like it is in the process of healing or scabbing over.  Avoid strenuous activities (elevating heart rate) or lifting more than 10 pounds for the next 3 days. Walking, elliptical and golf are examples of acceptable activities.  If there is bleeding or oozing from the procedure site, apply firm pressure to the area above your collar bone (where the catheter entered the bloodstream) for 10 minutes.  If the bleeding continues, continue to hold pressure and seek medical attention at the phone numbers below.  Mild procedure site discomfort can be treated with an ice pack and over-the-counter pain relievers.           Call our Interventional Radiology (IR) service if:  If you start bleeding from the procedure site. Our radiology provider can help you decide if you need to return to the hospital.  If you have new or worsening pain related to the procedure.  If you have concerning swelling at the procedure site.  If you develop hives or a rash or any unexplained itching.  If you have a fever of greater than 100.5  F and chills in the first 5 days after procedure.  Any other concerns related to  your procedure.    Contact Number:  [phone]  ([employer] control desk)  Monday - Friday 8:00 am - 4:30 pm    After hours for urgent concerns:  [phone]  After 4:30 pm Monday - Friday, Weekends and Holidays.   Ask for Interventional Radiology on-call.  Someone is available 24 hours a day.  [facility] toll free number:  [phone]

## 2023-04-05 ENCOUNTER — TELEPHONE (OUTPATIENT)
Dept: TRANSPLANT | Facility: CLINIC | Age: 28
End: 2023-04-05
Payer: COMMERCIAL

## 2023-04-05 NOTE — TELEPHONE ENCOUNTER
Leticia calls wanting refills on pt's meds. Instructed Leticia to reach out to the pharmacy directly for refills. Number give.

## 2023-04-06 ENCOUNTER — LAB (OUTPATIENT)
Dept: LAB | Facility: CLINIC | Age: 28
End: 2023-04-06
Payer: COMMERCIAL

## 2023-04-06 ENCOUNTER — TELEPHONE (OUTPATIENT)
Dept: TRANSPLANT | Facility: CLINIC | Age: 28
End: 2023-04-06

## 2023-04-06 DIAGNOSIS — R19.7 DIARRHEA: Primary | ICD-10-CM

## 2023-04-06 DIAGNOSIS — R19.7 DIARRHEA: ICD-10-CM

## 2023-04-06 DIAGNOSIS — Z94.4 LIVER REPLACED BY TRANSPLANT (H): ICD-10-CM

## 2023-04-06 DIAGNOSIS — R11.0 NAUSEA: ICD-10-CM

## 2023-04-06 LAB
ALBUMIN SERPL BCG-MCNC: 3.5 G/DL (ref 3.5–5.2)
ALP SERPL-CCNC: 134 U/L (ref 40–129)
ALT SERPL W P-5'-P-CCNC: 14 U/L (ref 10–50)
ANION GAP SERPL CALCULATED.3IONS-SCNC: 10 MMOL/L (ref 7–15)
AST SERPL W P-5'-P-CCNC: 23 U/L (ref 10–50)
BILIRUB DIRECT SERPL-MCNC: 0.36 MG/DL (ref 0–0.3)
BILIRUB SERPL-MCNC: 0.8 MG/DL
BUN SERPL-MCNC: 22.8 MG/DL (ref 6–20)
CALCIUM SERPL-MCNC: 9.5 MG/DL (ref 8.6–10)
CHLORIDE SERPL-SCNC: 100 MMOL/L (ref 98–107)
CREAT SERPL-MCNC: 0.92 MG/DL (ref 0.67–1.17)
DEPRECATED HCO3 PLAS-SCNC: 29 MMOL/L (ref 22–29)
ERYTHROCYTE [DISTWIDTH] IN BLOOD BY AUTOMATED COUNT: 16.3 % (ref 10–15)
GFR SERPL CREATININE-BSD FRML MDRD: >90 ML/MIN/1.73M2
GLUCOSE SERPL-MCNC: 47 MG/DL (ref 70–99)
HCT VFR BLD AUTO: 27.2 % (ref 40–53)
HGB BLD-MCNC: 8.9 G/DL (ref 13.3–17.7)
MAGNESIUM SERPL-MCNC: 1.3 MG/DL (ref 1.7–2.3)
MCH RBC QN AUTO: 32.7 PG (ref 26.5–33)
MCHC RBC AUTO-ENTMCNC: 32.7 G/DL (ref 31.5–36.5)
MCV RBC AUTO: 100 FL (ref 78–100)
PHOSPHATE SERPL-MCNC: 3.3 MG/DL (ref 2.5–4.5)
PLATELET # BLD AUTO: 247 10E3/UL (ref 150–450)
POTASSIUM SERPL-SCNC: 4.8 MMOL/L (ref 3.4–5.3)
PROT SERPL-MCNC: 6.2 G/DL (ref 6.4–8.3)
RBC # BLD AUTO: 2.72 10E6/UL (ref 4.4–5.9)
SODIUM SERPL-SCNC: 139 MMOL/L (ref 136–145)
TACROLIMUS BLD-MCNC: 8.3 UG/L (ref 5–15)
TME LAST DOSE: NORMAL H
TME LAST DOSE: NORMAL H
WBC # BLD AUTO: 8.8 10E3/UL (ref 4–11)

## 2023-04-06 PROCEDURE — 36415 COLL VENOUS BLD VENIPUNCTURE: CPT

## 2023-04-06 PROCEDURE — 83735 ASSAY OF MAGNESIUM: CPT

## 2023-04-06 PROCEDURE — 85027 COMPLETE CBC AUTOMATED: CPT

## 2023-04-06 PROCEDURE — 82248 BILIRUBIN DIRECT: CPT

## 2023-04-06 PROCEDURE — 80197 ASSAY OF TACROLIMUS: CPT

## 2023-04-06 PROCEDURE — 84100 ASSAY OF PHOSPHORUS: CPT

## 2023-04-06 PROCEDURE — 80053 COMPREHEN METABOLIC PANEL: CPT

## 2023-04-06 RX ORDER — ONDANSETRON 4 MG/1
4 TABLET, ORALLY DISINTEGRATING ORAL EVERY 6 HOURS PRN
Qty: 10 TABLET | Refills: 0 | Status: SHIPPED | OUTPATIENT
Start: 2023-04-06 | End: 2023-04-27

## 2023-04-06 NOTE — TELEPHONE ENCOUNTER
Spoke to Leticia. She states diarrhea has resolved. She did  stool kit. Instructed her that if diarrhea comes back to bring sample to lab. She understands.    Dr Sterling approved for refill on Zofran, prescription sent to preferred pharmacy.

## 2023-04-06 NOTE — TELEPHONE ENCOUNTER
"Leticia sent My[employer] message stating:    \"Something is giving him a bit of diarreha.  Loose stools.  Can I request a refill on Zofran?   It really  makes his stomach feel better.\"    Called Leticia/sent My[employer] message regarding diarrhea, no answer- LVM to return call. Stool studies ordered, LVM to  stool kit or have Dillon give sample while at lab. Message to provider for approval on Zofran refill request.     In addition, Hosea Byrne RN called requesting us to fax most recent medication list. Request sent to admin.  "

## 2023-04-07 ENCOUNTER — TELEPHONE (OUTPATIENT)
Dept: TRANSPLANT | Facility: CLINIC | Age: 28
End: 2023-04-07
Payer: COMMERCIAL

## 2023-04-07 NOTE — TELEPHONE ENCOUNTER
Spoke to Leticia. She reports she had a horrible migraine last night and slept through giving Dillon his PM medications. I advised her to give the medications as prescribed this morning and continue on his current medication regimen. Advised not to miss his medications again, she understands. Message to Dr Sterling as CHASIDY.    In addition, she reports Dillon still has swelling in feet & stomach area. Lasix was stopped, she is wondering if he should be back on the medication. She reports swelling hasn't worsened. Advised Leticia to let provider in clinic know Monday.    Reviewed glucose level 47 from yesterdays labs. Glucose level was rechecked and corrected, repeat glucose level 188.

## 2023-04-10 ENCOUNTER — LAB (OUTPATIENT)
Dept: LAB | Facility: CLINIC | Age: 28
End: 2023-04-10
Payer: COMMERCIAL

## 2023-04-10 ENCOUNTER — TELEPHONE (OUTPATIENT)
Dept: TRANSPLANT | Facility: CLINIC | Age: 28
End: 2023-04-10

## 2023-04-10 DIAGNOSIS — Z94.4 LIVER REPLACED BY TRANSPLANT (H): ICD-10-CM

## 2023-04-10 DIAGNOSIS — Z94.4 LIVER REPLACED BY TRANSPLANT (H): Primary | ICD-10-CM

## 2023-04-10 LAB
ALBUMIN SERPL BCG-MCNC: 3.5 G/DL (ref 3.5–5.2)
ALP SERPL-CCNC: 128 U/L (ref 40–129)
ALT SERPL W P-5'-P-CCNC: 12 U/L (ref 10–50)
ANION GAP SERPL CALCULATED.3IONS-SCNC: 12 MMOL/L (ref 7–15)
AST SERPL W P-5'-P-CCNC: 20 U/L (ref 10–50)
BILIRUB DIRECT SERPL-MCNC: 0.34 MG/DL (ref 0–0.3)
BILIRUB SERPL-MCNC: 0.7 MG/DL
BUN SERPL-MCNC: 16 MG/DL (ref 6–20)
CALCIUM SERPL-MCNC: 9.4 MG/DL (ref 8.6–10)
CHLORIDE SERPL-SCNC: 100 MMOL/L (ref 98–107)
CREAT SERPL-MCNC: 0.81 MG/DL (ref 0.67–1.17)
DEPRECATED HCO3 PLAS-SCNC: 27 MMOL/L (ref 22–29)
ERYTHROCYTE [DISTWIDTH] IN BLOOD BY AUTOMATED COUNT: 15.8 % (ref 10–15)
GFR SERPL CREATININE-BSD FRML MDRD: >90 ML/MIN/1.73M2
GLUCOSE SERPL-MCNC: 146 MG/DL (ref 70–99)
HCT VFR BLD AUTO: 27.2 % (ref 40–53)
HGB BLD-MCNC: 8.7 G/DL (ref 13.3–17.7)
MAGNESIUM SERPL-MCNC: 1.3 MG/DL (ref 1.7–2.3)
MCH RBC QN AUTO: 31.6 PG (ref 26.5–33)
MCHC RBC AUTO-ENTMCNC: 32 G/DL (ref 31.5–36.5)
MCV RBC AUTO: 99 FL (ref 78–100)
PHOSPHATE SERPL-MCNC: 3.4 MG/DL (ref 2.5–4.5)
PLATELET # BLD AUTO: 211 10E3/UL (ref 150–450)
POTASSIUM SERPL-SCNC: 4.7 MMOL/L (ref 3.4–5.3)
PROT SERPL-MCNC: 6.2 G/DL (ref 6.4–8.3)
RBC # BLD AUTO: 2.75 10E6/UL (ref 4.4–5.9)
SODIUM SERPL-SCNC: 139 MMOL/L (ref 136–145)
TACROLIMUS BLD-MCNC: 6.5 UG/L (ref 5–15)
TME LAST DOSE: NORMAL H
TME LAST DOSE: NORMAL H
WBC # BLD AUTO: 5.3 10E3/UL (ref 4–11)

## 2023-04-10 PROCEDURE — 80053 COMPREHEN METABOLIC PANEL: CPT

## 2023-04-10 PROCEDURE — 84100 ASSAY OF PHOSPHORUS: CPT

## 2023-04-10 PROCEDURE — 82248 BILIRUBIN DIRECT: CPT

## 2023-04-10 PROCEDURE — 36415 COLL VENOUS BLD VENIPUNCTURE: CPT

## 2023-04-10 PROCEDURE — 85027 COMPLETE CBC AUTOMATED: CPT

## 2023-04-10 PROCEDURE — 80197 ASSAY OF TACROLIMUS: CPT

## 2023-04-10 PROCEDURE — 83735 ASSAY OF MAGNESIUM: CPT

## 2023-04-10 NOTE — TELEPHONE ENCOUNTER
Per mom Leticia, please send [employer] message as she will be driving.  Had to cancel appts today,  1:45pm follow up with liver., and 2:00pm nutrition.  Will need to reschedule.  Father in law fell.  The whole family needed to help out

## 2023-04-10 NOTE — TELEPHONE ENCOUNTER
Leticia called stating that Dillon's grandfather fell down the stairs and the ambulance is on the way. His grandmother has advanced alzheimer's and cannot be home alone. She needs to cancel Dillon's appts for today and reschedule.    Appt request placed for Dillon to see Dr Sterling, message to [place] Nena Whitley to reschedule.

## 2023-04-11 ENCOUNTER — VIRTUAL VISIT (OUTPATIENT)
Dept: PHARMACY | Facility: CLINIC | Age: 28
End: 2023-04-11
Attending: SURGERY
Payer: COMMERCIAL

## 2023-04-11 ENCOUNTER — TELEPHONE (OUTPATIENT)
Dept: TRANSPLANT | Facility: CLINIC | Age: 28
End: 2023-04-11
Payer: COMMERCIAL

## 2023-04-11 DIAGNOSIS — G89.18 ACUTE POST-OPERATIVE PAIN: ICD-10-CM

## 2023-04-11 DIAGNOSIS — Z94.4 LIVER REPLACED BY TRANSPLANT (H): Primary | ICD-10-CM

## 2023-04-11 DIAGNOSIS — E66.9 DIABETES MELLITUS TYPE 2 IN OBESE: Chronic | ICD-10-CM

## 2023-04-11 DIAGNOSIS — E11.69 DIABETES MELLITUS TYPE 2 IN OBESE: Chronic | ICD-10-CM

## 2023-04-11 DIAGNOSIS — E87.5 HYPERKALEMIA: ICD-10-CM

## 2023-04-11 DIAGNOSIS — I10 ESSENTIAL HYPERTENSION: ICD-10-CM

## 2023-04-11 DIAGNOSIS — R60.9 EDEMA, UNSPECIFIED TYPE: ICD-10-CM

## 2023-04-11 PROCEDURE — 99607 MTMS BY PHARM ADDL 15 MIN: CPT | Performed by: PHARMACIST

## 2023-04-11 PROCEDURE — 99605 MTMS BY PHARM NP 15 MIN: CPT | Performed by: PHARMACIST

## 2023-04-11 RX ORDER — MAGNESIUM OXIDE 400 MG/1
400 TABLET ORAL 2 TIMES DAILY
Qty: 60 TABLET | Refills: 3 | Status: SHIPPED | OUTPATIENT
Start: 2023-04-11 | End: 2023-04-18

## 2023-04-11 RX ORDER — OXYCODONE HYDROCHLORIDE 5 MG/1
5 TABLET ORAL EVERY 6 HOURS PRN
COMMUNITY

## 2023-04-11 NOTE — TELEPHONE ENCOUNTER
Instructed Leticia to have pt increase magnesium BID. Leticia understood.     Leticia states that pt has been retaining fluid. Top of feet leaves an indention. Abdomen appears a little full.Leticia tries to elevate as mush as possible but doesn't to help Leticia would like to know if pt can restart lasix? Please advise.

## 2023-04-11 NOTE — PATIENT INSTRUCTIONS
"Recommendations from today's MTM visit:                                                       1. Get your medications 12 hours before your labs, try taking your medications at 12 and 12 so it is closer to your dose times. Recommend setting alarms at your dose times.    2. Increase Valcyte 450mg once every day.   3. Check blood sugars before breakfast, lunch, dinner, bedtime. Write down the blood sugar with the time in your log book.   4. Taper off oxycodone ASAP.     Follow-up: 4/27 at 12:30 PM    It was great speaking with you today.  I value your experience and would be very thankful for your time in providing feedback in our clinic survey. In the next few days, you may receive an email or text message from [employer] with a link to a survey related to your  clinical pharmacist.\"     To schedule another MTM appointment, please call the clinic directly or you may call the MTM scheduling line at [phone] or toll-free at [phone].     My Clinical Pharmacist's contact information:                                                      Please feel free to contact me with any questions or concerns you have.      Juan Castro, PharmD  MTM Pharmacist    Phone: [phone]     "

## 2023-04-11 NOTE — PROGRESS NOTES
Medication Therapy Management (MTM) Encounter    ASSESSMENT:                            Medication Adherence/Access: See below    Liver Transplant:  CrCl much improve, will increase Valcyte dose. Patient has been taking his txp medications at 1:30 am and pm, but getting labs at 10 AM per kenyatta. This will be giving us falsely elevated levels as it is <12 hours from last dose. Discussed with patient the important of getting labs 12 hours after last dose.     Type 2 Diabetes:  Patient has been inconsistently checking his blood sugars. Recommended a more rigid testing schedule: before breakfast, lunch, dinner, and a bedtime. Kenyatta was told to record these values. He is seeing endocrine next week, I will follow-up the week after. 3 days ago he reduced his Lantus from 25 units TDD to 10 units TDD due to lows in the morning. As his testing is so erratic it is difficult to make insulin changes today. Continue the Lantus 10 units once daily and Novolog sliding scale insulin. Patient has a poor appetite and is skipping meals.     Hyperkalemia: Potassium WNL, Recommend reducing florinef to 0.1mg three times per week, may help with patient's swelling as well.     Pain: PCP has given patient oxycodone 5mg prescription. Discussed it would be best to taper off this as he is over a month from transplant.     Edema: See hyperkalemia assessment.     Hypertension: BP at goal <140/90.     PLAN:                            1. Get your medications 12 hours before your labs, try taking your medications at 12 and 12 so it is closer to your dose times. Recommend setting alarms at your dose times.    2. Increase Valcyte 450mg once every day.   3. Check blood sugars before breakfast, lunch, dinner, bedtime. Write down the blood sugar with the time in your log book.   4. Taper off oxycodone ASAP.     Txp team FYI...  1. Patient has been taking 1:30 am and pm, but getting labs at 10 am. He will now start taking meds 12 and 12, and getting labs  at around the same time.   2. Recommend reducing Florinef to 3 times a week, patient has been having some swelling.  3. Patient has a poor appetite and is skipping meals.     Follow-up: 4/27 at 12:30 PM.     SUBJECTIVE/OBJECTIVE:                          Dillon Salter is a 27 year old male called for a transitions of care visit. He was discharged from [facility] 3/19 for liver transplant, readmitted briefly 3/22 for cavitary lesion of lung. Patient was accompanied by by his mother, Leticia.     Reason for visit: initial post transplant med review.     Allergies/ADRs: Reviewed in chart  Past Medical History: Reviewed in chart  Tobacco: He reports that he has quit smoking. He has never used smokeless tobacco.  Alcohol: not currently using  THC/CBD: none.     Medication Adherence/Access: Patient uses pill box(es) and has assistance with medication administration: family member, Leticia.  Patient takes medications 4 time(s) per day. 1:30 PM, 3:30pm and 1:30AM.   Per patient, misses medication 1 times per week.   The patient fills medications at [place]: YES.    Liver Transplant:  Current immunosuppressants include Tacrolimus 2mg twice daily, Prednisone 5mg every morning, and Mycophenolate Mofetil 750mg twice daily.  Pt reports loose stools for a week, but resolved. Has tremors mild to moderate, and mild nausea.   Transplant date: 2/28/23  Estimated Creatinine Clearance: 139.7 mL/min (based on SCr of 0.81 mg/dL).  CMV prophylaxis: CrCl 10 to 24 mL/minute: Valcyte 450 mg twice weekly Treat 3 months post tx.  PJP prophylaxis: Bactrim S S three times per week for 6 months post txp, decreased due to hyperkalemia  PPI use: Pantoprazole 40mg twice daily. Denies GERD sx.   Supplements: Mag Oxide 400mg twice daily ( 2 hours from MMF), Renalvite daily, Vitamin D3 25mcg daily, B1 100mg daily.   Other meds: Ursodiol 300mg twice daily  Vascular prophylaxis: Aspirin 325mg once daily. Denies gastrointestinal bleed sx.   Tx  Coordinator: Kelsey Ponce MD: Dr. Wood, Dr. Sterling, Using Med Card: Yes  Immunizations: annual flu shot ; [id number]:  ; Prevnar 13/15/20: unknown; TDaP:  ; Shingrix: unknown, HBV: immunity per last titers, COVID: Pfizer-BioNTech x3  Magnesium   Date Value Ref Range Status   04/10/2023 1.3 (L) 1.7 - 2.3 mg/dL Final   2023 1.3 (L) 1.7 - 2.3 mg/dL Final   2023 1.3 (L) 1.7 - 2.3 mg/dL Final     Type 2 Diabetes:  Currently taking Lantus 10 units every evening (REDUCED this for the last 3-4 days due to lows in the morning), Novolog sliding scale before meals. Patient is not experiencing side effects.  Blood sugar monitorin time(s) daily. Ranges (patient reported):   Symptoms of low blood sugar? Clammy, weak.   Symptoms of high blood sugar? none  Diet/Exercise: poor appetite. Sleep schedule is all off.   Lab Results   Component Value Date    A1C 5.2 2023    A1C 6.1 2023    A1C 5.6 2023    A1C 5.4 2022    A1C 7.4 2022     Date FBG/ 2hours post Lunch/2hours post bedtime   4/10 247 (4 am) 119 (2 PM) 174 (3:30 AM)    167 4 am      244 4am       157 181     Hyperkalemia: Pt is taking Fludrocortisone 0.1mg daily. Patient has been having swelling.   Lab Results   Component Value Date    POTASSIUM 4.7 04/10/2023    POTASSIUM 4.8 2023    POTASSIUM 4.8 2023     Pain: Pt is taking Acetaminophen 650mg twice daily prn, Oxycodone 5mg twice daily, up to 4 TIMES DAILY, has a taper from PCP, Methocarbamol 500mg at bedtime as it makes him sleep. Muscle aches/pain, leaking from incision, swelling in ankles. Can get to 7-8/10.     Edema: Pt is having swelling in ankles, feet, abdomen. Weights have been stable.     Hypertension: Current medications include Metoprolol 25mg twice daily.  Patient does self-monitor blood pressure. 130/70s.  Patient reports no current medication side effects.  BP Readings from Last 3 Encounters:   23 136/83   23  125/78   03/22/23 121/74     Today's Vitals: There were no vitals taken for this visit.  ----------------  Post Discharge Medication Reconciliation Status: discharge medications reconciled and changed, per note/orders.    I spent 45 minutes with this patient today. All changes were made via collaborative practice agreement with Dr. Sterling. A copy of the visit note was provided to the patient's provider(s).    A summary of these recommendations was sent via [employer].    Juan Castro, PharmD  [facility] Pharmacist    Phone: [phone]     Telemedicine Visit Details  Type of service:  Telephone visit  Start Time: 3:15 PM  End Time: 4:00 PM     Medication Therapy Recommendations  Acute post-operative pain    Current Medication: oxyCODONE (ROXICODONE) 5 MG tablet   Rationale: Unsafe medication for the patient - Adverse medication event - Safety   Recommendation: Provide Education   Status: Patient Agreed - Adherence/Education         Diabetes mellitus type 2 in obese (H)    Current Medication: insulin glargine (LANTUS PEN) 100 UNIT/ML pen   Rationale: Medication requires monitoring - Needs additional monitoring - Effectiveness   Recommendation: Self-Monitoring   Status: Patient Agreed - Adherence/Education         Hyperkalemia    Current Medication: fludrocortisone (FLORINEF) 0.1 MG tablet   Rationale: Dose too high - Dosage too high - Safety   Recommendation: Decrease Frequency   Status: Contact Provider - Awaiting Response         Liver replaced by transplant (H)    Current Medication: tacrolimus (GENERIC EQUIVALENT) 1 MG capsule   Rationale: Incorrect administration - Adverse medication event - Safety   Recommendation: Change Administration Time   Status: Patient Agreed - Adherence/Education          Current Medication: valGANciclovir (VALCYTE) 450 MG tablet   Rationale: Dose too low - Dosage too low - Effectiveness   Recommendation: Increase Frequency   Status: Accepted - no CPA Needed

## 2023-04-12 ENCOUNTER — TELEPHONE (OUTPATIENT)
Dept: TRANSPLANT | Facility: CLINIC | Age: 28
End: 2023-04-12
Payer: COMMERCIAL

## 2023-04-12 DIAGNOSIS — F32.A ANXIETY AND DEPRESSION: ICD-10-CM

## 2023-04-12 DIAGNOSIS — F41.9 ANXIETY AND DEPRESSION: ICD-10-CM

## 2023-04-12 NOTE — TELEPHONE ENCOUNTER
"\"Anderson Jones MD Scherling, Laura K, RN  Caller: Unspecified (Yesterday,  1:44 PM)  I think we should just stop florinef, monitor K, and if K rises, start lasix\"    Spoke to Leticia. Advised her to stop Dillon's florinef, and that if K increased lasix would be started. Also advised her to monitor swelling and daily weight, and to continue twice weekly labs. Leticia verbalized understanding. [employer] message sent to reinforce information given over the phone.   "

## 2023-04-12 NOTE — TELEPHONE ENCOUNTER
Leticia called stating that the [facility] cannot fill the Vit D d/t insurance issues. She is wondering if he can take same dose OTC. Informed her that taking the medication OTC is fine as long as it's the same dose.

## 2023-04-12 NOTE — TELEPHONE ENCOUNTER
Spoke to Leticia regarding Dillon's edema. She reports his feet/ankles and abdomen are swollen. His current weight is 160 lbs. Reports good UOP, denies urinary symptoms. Message to Dr Jones to review/advise.    In addition, patient continues to have poor appetite. Drinking about 1-2 Ensures/day. Eats small meals, high in protein throughout the day. Leticia reports sometimes Dillon will only eat half of the meal. Instructed Leticia to continue encouraging patient to eat even if he isn't hungry. She agrees. She reports he has been drinking plenty of fluids.    Spoke about recent PCP appt. PCP ordered tapering dose of Oxycodone. Encouraged Leticia to wean Dillon off of Oxycodone as soon as possible as he is over 1 month out from transplant. Encouraged lido patches, Robaxin and Tylenol for pain relief. Dillon is currently c/o generalized pain due to swelling, spasms. Incision site CDI, no s/s of infection.    Increased Magnesium to BID, started new dose yesterday. Denies diarrhea.

## 2023-04-13 ENCOUNTER — TELEPHONE (OUTPATIENT)
Dept: TRANSPLANT | Facility: CLINIC | Age: 28
End: 2023-04-13

## 2023-04-13 NOTE — TELEPHONE ENCOUNTER
Patient Call: General  Route to LPN    Reason for call: Home care was unsuccessful with labs and wants to know if labs can wait until Monday     Call back needed? Yes    Return Call Needed  Same as documented in contacts section  When to return call?: Greater than one day: Route standard priority

## 2023-04-14 ENCOUNTER — TEAM CONFERENCE (OUTPATIENT)
Dept: TRANSPLANT | Facility: CLINIC | Age: 28
End: 2023-04-14
Payer: COMMERCIAL

## 2023-04-14 DIAGNOSIS — Z94.4 LIVER REPLACED BY TRANSPLANT (H): Primary | ICD-10-CM

## 2023-04-14 NOTE — TELEPHONE ENCOUNTER
Post Liver Transplant Team Conference  Date: 4/14/2023  Transplant Coordinator: Vicky Castro  Transplant Surgeon: Thelma Sterling      Attendees:  [] Dr. Hardy [] Dr. Mayorga []       [x] Dr. Rondon [x] Apolonia Peters LPN []    [] Dr. Nelson [] Kyra Mcnulty NP []    [] Dr. Sterling [] Vicky Peres NP []    [] Dr. Vega [] Vicky Castro, RN []       [] Dr. Wood [x] Urmila Hill, CONI []       [] Dr. Manuel Peoples [x] Claudia Bal, CONI []       [] Dr. SAMANTHA Shaikh [x] Lelo Mccoy RN []       [x] Juan Castro, pharm [] Lindsay Harper RN []       [] Dr. Guillen [] Shelton Hicks RN []         Verbal Plan Read Back:   - Reinforce importance of medication compliance with Dillon's Mom.   - Stop Thiamine.  - When Renavite is finished OK to take regular over the counter multivitamin.   - Decrease Aspirin to 81 mg daily.   - Stop Robaxin.   - Follow up with PCP regarding BP and BG.     Called Leticia and reviewed the above information. Med list updated.     Leticia said Dillon hasn't been taking Robaxin.  He is currently weaning off Oxy through his PCP and will be done soon.     Home care has had difficulty obtaining labs and that has caused some administration time difficulty. I asked Leticia to keep Dillon's IS medication admin times regular and on a 12 hour schedule and we can talk to home care if lab draw times are continuing to be a problem. If home care can't accommodate Leticia is aware she will need to take Dillon to local lab and she verbalized understanding of the importance.         Routed to RN Coordinator   Urmila Hill, RN

## 2023-04-17 ENCOUNTER — OFFICE VISIT (OUTPATIENT)
Dept: TRANSPLANT | Facility: CLINIC | Age: 28
End: 2023-04-17
Attending: SURGERY
Payer: COMMERCIAL

## 2023-04-17 ENCOUNTER — LAB (OUTPATIENT)
Dept: LAB | Facility: CLINIC | Age: 28
End: 2023-04-17
Payer: COMMERCIAL

## 2023-04-17 ENCOUNTER — TELEPHONE (OUTPATIENT)
Dept: TRANSPLANT | Facility: CLINIC | Age: 28
End: 2023-04-17

## 2023-04-17 VITALS
HEART RATE: 76 BPM | SYSTOLIC BLOOD PRESSURE: 132 MMHG | DIASTOLIC BLOOD PRESSURE: 89 MMHG | BODY MASS INDEX: 26.66 KG/M2 | OXYGEN SATURATION: 97 % | WEIGHT: 160.2 LBS

## 2023-04-17 DIAGNOSIS — Z94.4 LIVER REPLACED BY TRANSPLANT (H): ICD-10-CM

## 2023-04-17 DIAGNOSIS — Z94.4 LIVER REPLACED BY TRANSPLANT (H): Primary | ICD-10-CM

## 2023-04-17 LAB
ALBUMIN SERPL BCG-MCNC: 3.6 G/DL (ref 3.5–5.2)
ALP SERPL-CCNC: 129 U/L (ref 40–129)
ALT SERPL W P-5'-P-CCNC: 19 U/L (ref 10–50)
ANION GAP SERPL CALCULATED.3IONS-SCNC: 10 MMOL/L (ref 7–15)
AST SERPL W P-5'-P-CCNC: 28 U/L (ref 10–50)
BASOPHILS # BLD AUTO: 0.1 10E3/UL (ref 0–0.2)
BASOPHILS NFR BLD AUTO: 1 %
BILIRUB DIRECT SERPL-MCNC: 0.29 MG/DL (ref 0–0.3)
BILIRUB SERPL-MCNC: 0.7 MG/DL
BUN SERPL-MCNC: 13.8 MG/DL (ref 6–20)
CALCIUM SERPL-MCNC: 9.6 MG/DL (ref 8.6–10)
CHLORIDE SERPL-SCNC: 96 MMOL/L (ref 98–107)
CREAT SERPL-MCNC: 0.69 MG/DL (ref 0.67–1.17)
DEPRECATED HCO3 PLAS-SCNC: 30 MMOL/L (ref 22–29)
EOSINOPHIL # BLD AUTO: 0.2 10E3/UL (ref 0–0.7)
EOSINOPHIL NFR BLD AUTO: 3 %
ERYTHROCYTE [DISTWIDTH] IN BLOOD BY AUTOMATED COUNT: 15.6 % (ref 10–15)
GFR SERPL CREATININE-BSD FRML MDRD: >90 ML/MIN/1.73M2
GLUCOSE SERPL-MCNC: 116 MG/DL (ref 70–99)
HCT VFR BLD AUTO: 28.8 % (ref 40–53)
HGB BLD-MCNC: 9.4 G/DL (ref 13.3–17.7)
IMM GRANULOCYTES # BLD: 0 10E3/UL
IMM GRANULOCYTES NFR BLD: 0 %
LYMPHOCYTES # BLD AUTO: 0.8 10E3/UL (ref 0.8–5.3)
LYMPHOCYTES NFR BLD AUTO: 13 %
MAGNESIUM SERPL-MCNC: 1.2 MG/DL (ref 1.7–2.3)
MCH RBC QN AUTO: 31.3 PG (ref 26.5–33)
MCHC RBC AUTO-ENTMCNC: 32.6 G/DL (ref 31.5–36.5)
MCV RBC AUTO: 96 FL (ref 78–100)
MONOCYTES # BLD AUTO: 0.3 10E3/UL (ref 0–1.3)
MONOCYTES NFR BLD AUTO: 5 %
NEUTROPHILS # BLD AUTO: 4.7 10E3/UL (ref 1.6–8.3)
NEUTROPHILS NFR BLD AUTO: 78 %
PHOSPHATE SERPL-MCNC: 3.5 MG/DL (ref 2.5–4.5)
PLATELET # BLD AUTO: 203 10E3/UL (ref 150–450)
POTASSIUM SERPL-SCNC: 5 MMOL/L (ref 3.4–5.3)
PROT SERPL-MCNC: 6.1 G/DL (ref 6.4–8.3)
RBC # BLD AUTO: 3 10E6/UL (ref 4.4–5.9)
SODIUM SERPL-SCNC: 136 MMOL/L (ref 136–145)
TACROLIMUS BLD-MCNC: 6.9 UG/L (ref 5–15)
TME LAST DOSE: NORMAL H
TME LAST DOSE: NORMAL H
WBC # BLD AUTO: 6.1 10E3/UL (ref 4–11)

## 2023-04-17 PROCEDURE — 85025 COMPLETE CBC W/AUTO DIFF WBC: CPT

## 2023-04-17 PROCEDURE — 80197 ASSAY OF TACROLIMUS: CPT

## 2023-04-17 PROCEDURE — 84100 ASSAY OF PHOSPHORUS: CPT

## 2023-04-17 PROCEDURE — 36415 COLL VENOUS BLD VENIPUNCTURE: CPT

## 2023-04-17 PROCEDURE — 99213 OFFICE O/P EST LOW 20 MIN: CPT | Mod: 24 | Performed by: SURGERY

## 2023-04-17 PROCEDURE — 80053 COMPREHEN METABOLIC PANEL: CPT

## 2023-04-17 PROCEDURE — G0463 HOSPITAL OUTPT CLINIC VISIT: HCPCS | Performed by: SURGERY

## 2023-04-17 PROCEDURE — 82248 BILIRUBIN DIRECT: CPT

## 2023-04-17 PROCEDURE — 83735 ASSAY OF MAGNESIUM: CPT

## 2023-04-17 NOTE — PROGRESS NOTES
Transplant Surgery Progress Note    Transplants:  2/28/2023 (Liver); Postoperative day:  48  S: Doing well. No fever, no chills.   Eating OK. Appetite OK. No nausea, vomiting. Admits to some bloating.   Admits to bilateral edema.      Transplant History:    Transplant Type:  Liver Tx  Donor Type:  DBD   Transplant Date:  2/28/2023 (Liver)     Acute Rejection Hx:  No    Present Maintenance Immunosuppression:  Tacrolimus 2.5mg bid, 5mg prednisone daily and Mycophenolate mofetil 750 bid    CMV IgG Ab Discordance:  No  EBV IgG Ab Discordance:  Yes    BK Viremia: n/a  EBV Viremia:  No    Transplant Coordinator: Vicky Castro     Transplant Office Phone Number: [phone]     Immunosuppressant Medications     Immunosuppressive Agents Disp Start End     mycophenolate (GENERIC EQUIVALENT) 250 MG capsule    180 capsule 3/19/2023     Sig - Route: Take 3 capsules (750 mg) by mouth 2 times daily - Oral    Class: E-Prescribe     tacrolimus (GENERIC EQUIVALENT) 0.5 MG capsule    60 capsule 3/19/2023     Sig - Route: Take 1 capsule (0.5 mg) by mouth 2 times daily Take 1 cap by mouth twice daily as directed by Transplant Center for dose changes. - Oral    Class: E-Prescribe     tacrolimus (GENERIC EQUIVALENT) 1 MG capsule    120 capsule 3/19/2023     Sig - Route: Take 2 capsules (2 mg) by mouth 2 times daily - Oral    Class: E-Prescribe    Renewals     Renewal requests to authorizing provider (Julieth Leblanc NP) <b>prohibited</b>                Possible Immunosuppression-related side effects:   []             headache  []             vivid dreams  []             irritability  []             cognitive difficuties  []             fine tremor  []             nausea  []             diarrhea  []             neuropathy      []             edema  []             renal calcineurin toxicity  []             hyperkalemia  []             post-transplant diabetes  []             decreased appetite  []             increased  appetite  []             other:  []             none    Prescription Medications as of 2023       Rx Number Disp Refills Start End Last Dispensed Date Next Fill Date Owning Pharmacy    acetaminophen (TYLENOL) 325 MG tablet  100 tablet 0 3/19/2023    [facility] [address]    Si tablets (650 mg) by Oral or Feeding Tube route every 8 hours as needed for mild pain or fever    Class: E-Prescribe    Route: Oral or Feeding Tube    Renewals     Renewal requests to authorizing provider (Julieth Leblanc NP) <b>prohibited</b>          aspirin (ASA) 81 MG EC tablet  30 tablet 0 2023    [facility] [place] [place] [facility] [address] [facility]    Sig: Take 1 tablet (81 mg) by mouth daily    Class: E-Prescribe    Notes to Pharmacy: Will purchase OTC.    Route: Oral    FLUoxetine (PROZAC) 20 MG capsule  90 capsule 0 2023        Sig: Take 3 capsules (60 mg) by mouth At Bedtime    Class: Historical    Route: Oral    insulin aspart (NOVOLOG FLEXPEN) 100 UNIT/ML pen  15 mL 3 2022    [facility][id number] [place] [address] [address] [facility]    Si unit per 10 grams of carb, plus additional units based on following scale   For Pre-Meal  - 164 give 1 unit. For Pre-Meal  - 189 give 2 units. For Pre-Meal  - 214 give 3 units. For Pre-Meal  - 239 give 4 units. For Pre-Meal  - 264 give 5 units. For Pre-Meal  - 289 give 6 units. For Pre-Meal  - 314 give 7 units. For Pre-Meal  - 339 give 8 units. For Pre-Meal  - 364 give 9 units.  For Pre-Meal BG greater than or equal to 365 give 10 units    Class: No Print Out    insulin glargine (LANTUS PEN) 100 UNIT/ML pen  15 mL 0 3/19/2023    [facility] - [place], [place] - [address] [facility]    Sig: Inject 15 Units Subcutaneous every morning AND 10 Units At Bedtime.    Class: E-Prescribe    Notes to  Pharmacy: If Lantus is not covered by insurance, may substitute Basaglar or Semglee or other insulin glargine product per insurance preference at same dose and frequency.      Route: Subcutaneous    Renewals     Renewal requests to authorizing provider (Julieth Leblanc NP) <b>prohibited</b>          Lidocaine (LIDOCARE) 4 % Patch  15 patch 0 3/19/2023    [address]    Sig: Place 2 patches onto the skin every 24 hours To prevent lidocaine toxicity, patient should be patch free for 12 hrs daily.    Class: E-Prescribe    Route: Transdermal    magnesium oxide (MAG-OX) 400 MG tablet  60 tablet 3 4/11/2023    [facility] [place] [address] [address]     Sig: Take 1 tablet (400 mg) by mouth 2 times daily    Class: E-Prescribe    Route: Oral    metoprolol tartrate (LOPRESSOR) 25 MG tablet  60 tablet 1 3/17/2023    [facility] - [place], [place] - [address] [place] [address]    Sig: Take 1 tablet (25 mg) by mouth 2 times daily    Class: E-Prescribe    Route: Oral    Renewals     Renewal requests to authorizing provider (Julieth Leblanc, NP) <b>prohibited</b>          multivitamin RENAL (RENAVITE RX/NEPHROVITE) 1 MG tablet  90 tablet 3 3/28/2023    [facility] [place] [address] [address]     Sig: Take 1 tablet by mouth daily    Class: E-Prescribe    Route: Oral    mycophenolate (GENERIC EQUIVALENT) 250 MG capsule  540 capsule 3 3/28/2023    [facility] [place] [address] [address]     Sig: Take 3 capsules (750 mg) by mouth 2 times daily    Class: E-Prescribe    Notes to Pharmacy: TXP DT 2/28/2023 (Liver) TXP Dischg DT 3/19/2023 DX Liver replaced by transplant Z94.4 TX [facility] ([place], [place])    Route: Oral    ondansetron (ZOFRAN ODT) 4 MG ODT tab  10 tablet 0 4/6/2023    [id number] [place], [place]  [facility] [address] [facility]    Sig: Take 1 tablet (4 mg) by mouth every 6 hours as needed for nausea or vomiting    Class: E-Prescribe    Route: Oral    oxyCODONE (ROXICODONE) 5 MG tablet            Sig: Take 5 mg by mouth every 6 hours as needed for severe pain From PCP    Class: Historical    Route: Oral    pantoprazole (PROTONIX) 40 MG EC tablet  180 tablet 3 3/28/2023    [facility] - [place], [place] - [facility] [address]    Sig: Take 1 tablet (40 mg) by mouth 2 times daily    Class: E-Prescribe    Route: Oral    predniSONE (DELTASONE) 5 MG tablet  90 tablet 1 3/28/2023    [facility] - [facility] [address]    Sig: Take 1 tablet (5 mg) by mouth daily    Class: E-Prescribe    Notes to Pharmacy: TXP DT 2/28/2023 (Liver) TXP Dischg DT 3/19/2023 DX Liver replaced by transplant Z94.4 [facility] ([place], [place])    Route: Oral    sulfamethoxazole-trimethoprim (BACTRIM) 400-80 MG tablet  36 tablet 3 3/29/2023    [facility] - [place] [address] [address]    Sig: Take 1 tablet by mouth three times a week Increase as instructed per transplant team    Class: E-Prescribe    Route: Oral    tacrolimus (GENERIC EQUIVALENT) 0.5 MG capsule  60 capsule 0 3/19/2023    [facility] - [place], [place] - [address]    Sig: Take 1 capsule (0.5 mg) by mouth 2 times daily Take 1 cap by mouth twice daily as directed by Transplant Center for dose changes.    Class: E-Prescribe    Route: Oral    tacrolimus (GENERIC EQUIVALENT) 1 MG capsule  360 capsule 3 3/28/2023    [facility] [address]    Sig: Take 2 capsules (2 mg) by mouth 2 times daily    Class: E-Prescribe    Notes to Pharmacy: TXP DT 2/28/2023 (Liver) TXP Dischg DT 3/19/2023 DX Liver replaced by transplant Z94.4 TX [facility]  [facility] ([place], [place])    Route: Oral    ursodiol (ACTIGALL) 300 MG capsule  180 capsule 3 3/28/2023    [facility] - [place] [address] [address]    Sig: Take 1 capsule (300 mg) by mouth 2 times daily    Class: E-Prescribe    Route: Oral    valGANciclovir (VALCYTE) 450 MG tablet  30 tablet 2 3/30/2023    [facility] - [place] [address] [address]    Sig: Take 1 tablet (450 mg) by mouth twice a week Increase to 450mg daily when directed by transplant team with improving renal function    Class: E-Prescribe    Route: Oral    Vitamin D3 (CHOLECALCIFEROL) 25 mcg (1000 units) tablet  90 tablet 3 3/28/2023    [facility] - [place] [address] [address]    Si tablet (25 mcg) by Oral or Feeding Tube route daily    Class: E-Prescribe    Route: Oral or Feeding Tube          O:   Pulse:  [76] 76  BP: (132-143)/(89-93) 132/89  SpO2:  [97 %] 97 %  General Appearance: in no apparent distress.   Skin: Normal, no rashes or jaundice  Lungs: easy respirations, no audible wheezing.  Abdomen: soft, nontender, nondistended, scabbing throughout abdomen, incision healing well, serous drainage from midline.   Extremities: Tremor present bilateral.         Latest Ref Rng & Units 2023    11:52 AM 4/10/2023    10:33 AM 2023    10:06 AM 4/3/2023    10:01 AM 3/29/2023    10:15 AM   Transplant Immunosuppression Labs   Creat 0.67 - 1.17 mg/dL  0.81   0.92   0.92   1.04     Urea Nitrogen 6.0 - 20.0 mg/dL  16.0   22.8   22.7   40.7     WBC 4.0 - 11.0 10e3/uL 6.1   5.3   8.8   6.9   6.3     Neutrophil % 78             Chemistries:   Recent Labs   Lab Test 04/10/23  1033   BUN 16.0   CR 0.81   GFRESTIMATED >90   *     Lab Results   Component Value Date    A1C 5.2 2023     Recent Labs   Lab Test 04/10/23  1033   ALBUMIN 3.5   BILITOTAL 0.7   ALKPHOS 128   AST 20   ALT 12     Urine Studies:  Recent Labs   Lab Test 23  1132    COLOR Yellow   APPEARANCE Clear   URINEGLC Negative   URINEBILI Negative   URINEKETONE Negative   SG 1.016   UBLD Negative   URINEPH 5.0   PROTEIN 10*   NITRITE Negative   LEUKEST Negative   RBCU <1   WBCU 0     No lab results found.  Hematology:   Recent Labs   Lab Test 04/17/23  1152 04/10/23  1033 04/06/23  1006   HGB 9.4* 8.7* 8.9*    211 247   WBC 6.1 5.3 8.8     Coags:   Recent Labs   Lab Test 03/10/23  0603 03/09/23  0134   INR 1.11 1.19*     HLA antibodies:   No results found for: DR1REENGC, QJ1MHUEPEH, OI3IHMUXG, TC0DTSIGIS    Assessment: Dillon Salter is doing well s/p Liver Tx:    Plan:    1. Graft function: pending; improving trend   2. Kidney function: had ASHISH after surgery; recovered; last Cr normal  3. Bilateral leg edema: recommend for bilateral stocking, recommend for lasix 40 daily for 5 days. If not improved, will discuss MRV liver to evaluate for venous outflow obstruction     Jerson Sallie Rodas MD   Transplant Surgery Fellow     I have reviewed history, examined patient and discussed plan with the fellow/resident/CHRISTA.  I concur with the findings in this note.    Immunosuppressive regimen, management and long term risks discussed in detail. Changes, when applicable made as per orders.      Total time: 20 min  Counseling time: 10 min

## 2023-04-17 NOTE — TELEPHONE ENCOUNTER
Please call Karen  [facility] imaging dept.,   questioning order for CTA scheduled tomorrow 04/18/2023 Karen# [phone]

## 2023-04-17 NOTE — LETTER
4/17/2023         RE: Dillon Salter  [address] [place] [address]        Dear Colleague,    Thank you for referring your patient, Dillon Salter, to the [facility]. Please see a copy of my visit note below.    Transplant Surgery Progress Note    Transplants:  2/28/2023 (Liver); Postoperative day:  48  S: Doing well. No fever, no chills.   Eating OK. Appetite OK. No nausea, vomiting. Admits to some bloating.   Admits to bilateral edema.      Transplant History:    Transplant Type:  Liver Tx  Donor Type:  DBD   Transplant Date:  2/28/2023 (Liver)     Acute Rejection Hx:  No    Present Maintenance Immunosuppression:  Tacrolimus 2.5mg bid, 5mg prednisone daily and Mycophenolate mofetil 750 bid    CMV IgG Ab Discordance:  No  EBV IgG Ab Discordance:  Yes    BK Viremia: n/a  EBV Viremia:  No    Transplant Coordinator: Vicky Castro     Transplant Office Phone Number: [phone]     Immunosuppressant Medications       Immunosuppressive Agents Disp Start End     mycophenolate (GENERIC EQUIVALENT) 250 MG capsule    180 capsule 3/19/2023     Sig - Route: Take 3 capsules (750 mg) by mouth 2 times daily - Oral    Class: E-Prescribe     tacrolimus (GENERIC EQUIVALENT) 0.5 MG capsule    60 capsule 3/19/2023     Sig - Route: Take 1 capsule (0.5 mg) by mouth 2 times daily Take 1 cap by mouth twice daily as directed by Transplant Center for dose changes. - Oral    Class: E-Prescribe     tacrolimus (GENERIC EQUIVALENT) 1 MG capsule    120 capsule 3/19/2023     Sig - Route: Take 2 capsules (2 mg) by mouth 2 times daily - Oral    Class: E-Prescribe    Renewals       Renewal requests to authorizing provider (Julieth Leblanc NP) <b>prohibited</b>                    Possible Immunosuppression-related side effects:   []             headache  []             vivid dreams  []             irritability  []             cognitive difficuties  []             fine tremor  []              nausea  []             diarrhea  []             neuropathy      []             edema  []             renal calcineurin toxicity  []             hyperkalemia  []             post-transplant diabetes  []             decreased appetite  []             increased appetite  []             other:  []             none    Prescription Medications as of 2023         Rx Number Disp Refills Start End Last Dispensed Date Next Fill Date Owning Pharmacy    acetaminophen (TYLENOL) 325 MG tablet  100 tablet 0 3/19/2023    [facility] - [place], [place] - [address]    Si tablets (650 mg) by Oral or Feeding Tube route every 8 hours as needed for mild pain or fever    Class: E-Prescribe    Route: Oral or Feeding Tube    Renewals       Renewal requests to authorizing provider (Julieth Leblanc NP) <b>prohibited</b>            aspirin (ASA) 81 MG EC tablet  30 tablet 0 2023    [facility] [place] [place] [facility] [address] [facility]    Sig: Take 1 tablet (81 mg) by mouth daily    Class: E-Prescribe    Notes to Pharmacy: Will purchase OTC.    Route: Oral    FLUoxetine (PROZAC) 20 MG capsule  90 capsule 0 2023        Sig: Take 3 capsules (60 mg) by mouth At Bedtime    Class: Historical    Route: Oral    insulin aspart (NOVOLOG FLEXPEN) 100 UNIT/ML pen  15 mL 3 2022    [facility][id number] [place] [address] [address] AT [facility]    Si unit per 10 grams of carb, plus additional units based on following scale   For Pre-Meal  - 164 give 1 unit. For Pre-Meal  - 189 give 2 units. For Pre-Meal  - 214 give 3 units. For Pre-Meal  - 239 give 4 units. For Pre-Meal  - 264 give 5 units. For Pre-Meal  - 289 give 6 units. For Pre-Meal  - 314 give 7 units. For Pre-Meal  - 339 give 8 units. For Pre-Meal  - 364 give 9 units.  For Pre-Meal BG greater than or equal to 365 give 10 units    Class: No Print  Out    insulin glargine (LANTUS PEN) 100 UNIT/ML pen  15 mL 0 3/19/2023    [address]    Sig: Inject 15 Units Subcutaneous every morning AND 10 Units At Bedtime.    Class: E-Prescribe    Notes to Pharmacy: If Lantus is not covered by insurance, may substitute Basaglar or Semglee or other insulin glargine product per insurance preference at same dose and frequency.      Route: Subcutaneous    Renewals       Renewal requests to authorizing provider (Julieth Leblanc NP) <b>prohibited</b>            Lidocaine (LIDOCARE) 4 % Patch  15 patch 0 3/19/2023    [address]    Sig: Place 2 patches onto the skin every 24 hours To prevent lidocaine toxicity, patient should be patch free for 12 hrs daily.    Class: E-Prescribe    Route: Transdermal    magnesium oxide (MAG-OX) 400 MG tablet  60 tablet 3 4/11/2023    [place] [facility] [address] [address]     Sig: Take 1 tablet (400 mg) by mouth 2 times daily    Class: E-Prescribe    Route: Oral    metoprolol tartrate (LOPRESSOR) 25 MG tablet  60 tablet 1 3/17/2023    [address]    Sig: Take 1 tablet (25 mg) by mouth 2 times daily    Class: E-Prescribe    Route: Oral    Renewals       Renewal requests to authorizing provider (Julieth Leblanc, NP) <b>prohibited</b>            multivitamin RENAL (RENAVITE RX/NEPHROVITE) 1 MG tablet  90 tablet 3 3/28/2023    [facility] [place] [facility] [address] [address]     Sig: Take 1 tablet by mouth daily    Class: E-Prescribe    Route: Oral    mycophenolate (GENERIC EQUIVALENT) 250 MG capsule  540 capsule 3 3/28/2023    [place] [place] [facility] [address] [address]     Sig: Take 3 capsules (750 mg) by mouth 2 times daily    Class: E-Prescribe    Notes to Pharmacy: TXP DT 2/28/2023  (Liver) TXP Dischg DT 3/19/2023 DX Liver replaced by transplant Z94.4 TX Center [facility] ([place], [place])    Route: Oral    ondansetron (ZOFRAN ODT) 4 MG ODT tab  10 tablet 0 4/6/2023    [facility][id number] - [place], [place] - [address] [facility]    Sig: Take 1 tablet (4 mg) by mouth every 6 hours as needed for nausea or vomiting    Class: E-Prescribe    Route: Oral    oxyCODONE (ROXICODONE) 5 MG tablet            Sig: Take 5 mg by mouth every 6 hours as needed for severe pain From PCP    Class: Historical    Route: Oral    pantoprazole (PROTONIX) 40 MG EC tablet  180 tablet 3 3/28/2023    [facility] - [place], [place] - [address]    Sig: Take 1 tablet (40 mg) by mouth 2 times daily    Class: E-Prescribe    Route: Oral    predniSONE (DELTASONE) 5 MG tablet  90 tablet 1 3/28/2023    [facility] - [place], [place] - [address]    Sig: Take 1 tablet (5 mg) by mouth daily    Class: E-Prescribe    Notes to Pharmacy: TXP DT 2/28/2023 (Liver) TXP Dischg DT 3/19/2023 DX Liver replaced by transplant Z94.4 TX [facility] ([place], [place])    Route: Oral    sulfamethoxazole-trimethoprim (BACTRIM) 400-80 MG tablet  36 tablet 3 3/29/2023    [facility] - [place], [place] - [address]    Sig: Take 1 tablet by mouth three times a week Increase as instructed per transplant team    Class: E-Prescribe    Route: Oral    tacrolimus (GENERIC EQUIVALENT) 0.5 MG capsule  60 capsule 0 3/19/2023    [facility] - [place], [place] - [address]    Sig: Take 1 capsule (0.5 mg) by mouth 2 times daily Take 1 cap by mouth twice daily as directed by Transplant Center for dose changes.    Class: E-Prescribe    Route: Oral    tacrolimus (GENERIC EQUIVALENT) 1 MG capsule  360 capsule 3 3/28/2023    [facility] - [place] [address] [address]    Sig: Take 2 capsules (2 mg) by mouth 2 times daily    Class: E-Prescribe    Notes to Pharmacy: TXP DT 2023 (Liver) TXP Dischg DT 3/19/2023 DX Liver replaced by transplant Z94.4 TX Center [facility] ([place], [place])    Route: Oral    ursodiol (ACTIGALL) 300 MG capsule  180 capsule 3 3/28/2023    [facility] - [place] [address] [address]    Sig: Take 1 capsule (300 mg) by mouth 2 times daily    Class: E-Prescribe    Route: Oral    valGANciclovir (VALCYTE) 450 MG tablet  30 tablet 2 3/30/2023    [facility] [place] [address] [address]    Sig: Take 1 tablet (450 mg) by mouth twice a week Increase to 450mg daily when directed by transplant team with improving renal function    Class: E-Prescribe    Route: Oral    Vitamin D3 (CHOLECALCIFEROL) 25 mcg (1000 units) tablet  90 tablet 3 3/28/2023    [facility] [place] [address]    Si tablet (25 mcg) by Oral or Feeding Tube route daily    Class: E-Prescribe    Route: Oral or Feeding Tube            O:   Pulse:  [76] 76  BP: (132-143)/(89-93) 132/89  SpO2:  [97 %] 97 %  General Appearance: in no apparent distress.   Skin: Normal, no rashes or jaundice  Lungs: easy respirations, no audible wheezing.  Abdomen: soft, nontender, nondistended, scabbing throughout abdomen, incision healing well, serous drainage from midline.   Extremities: Tremor present bilateral.         Latest Ref Rng & Units 2023    11:52 AM 4/10/2023    10:33 AM 2023    10:06 AM 4/3/2023    10:01 AM 3/29/2023    10:15 AM   Transplant Immunosuppression Labs   Creat 0.67 - 1.17 mg/dL  0.81   0.92   0.92   1.04     Urea Nitrogen 6.0 - 20.0 mg/dL  16.0   22.8   22.7   40.7     WBC 4.0 - 11.0 10e3/uL 6.1   5.3   8.8   6.9   6.3     Neutrophil % 78             Chemistries:   Recent Labs   Lab Test 04/10/23  1031    BUN 16.0   CR 0.81   GFRESTIMATED >90   *     Lab Results   Component Value Date    A1C 5.2 03/12/2023     Recent Labs   Lab Test 04/10/23  1033   ALBUMIN 3.5   BILITOTAL 0.7   ALKPHOS 128   AST 20   ALT 12     Urine Studies:  Recent Labs   Lab Test 03/22/23  1136   COLOR Yellow   APPEARANCE Clear   URINEGLC Negative   URINEBILI Negative   URINEKETONE Negative   SG 1.016   UBLD Negative   URINEPH 5.0   PROTEIN 10*   NITRITE Negative   LEUKEST Negative   RBCU <1   WBCU 0     No lab results found.  Hematology:   Recent Labs   Lab Test 04/17/23  1152 04/10/23  1033 04/06/23  1006   HGB 9.4* 8.7* 8.9*    211 247   WBC 6.1 5.3 8.8     Coags:   Recent Labs   Lab Test 03/10/23  0603 03/09/23  0134   INR 1.11 1.19*     HLA antibodies:   No results found for: WM8NLUAAT, ZS4QGSYCDP, MN4YYZRSA, DL7RBWPWDH    Assessment: Dillon Salter is doing well s/p Liver Tx:    Plan:    1. Graft function: pending; improving trend   2. Kidney function: had ASHISH after surgery; recovered; last Cr normal  3. Bilateral leg edema: recommend for bilateral stocking, recommend for lasix 40 daily for 5 days. If not improved, will discuss MRV liver to evaluate for venous outflow obstruction     Jerson Sallie Rodas MD   Transplant Surgery Fellow     I have reviewed history, examined patient and discussed plan with the fellow/resident/CHRISTA.  I concur with the findings in this note.    Immunosuppressive regimen, management and long term risks discussed in detail. Changes, when applicable made as per orders.      Total time: 20 min  Counseling time: 10 min

## 2023-04-18 ENCOUNTER — TELEPHONE (OUTPATIENT)
Dept: TRANSPLANT | Facility: CLINIC | Age: 28
End: 2023-04-18

## 2023-04-18 DIAGNOSIS — E83.42 HYPOMAGNESEMIA: ICD-10-CM

## 2023-04-18 DIAGNOSIS — Z94.4 LIVER REPLACED BY TRANSPLANT (H): ICD-10-CM

## 2023-04-18 RX ORDER — HEPARIN SODIUM,PORCINE 10 UNIT/ML
5 VIAL (ML) INTRAVENOUS
Status: CANCELLED | OUTPATIENT
Start: 2023-04-18

## 2023-04-18 RX ORDER — METHYLPREDNISOLONE SODIUM SUCCINATE 125 MG/2ML
125 INJECTION, POWDER, LYOPHILIZED, FOR SOLUTION INTRAMUSCULAR; INTRAVENOUS
Status: CANCELLED
Start: 2023-04-18

## 2023-04-18 RX ORDER — EPINEPHRINE 1 MG/ML
0.3 INJECTION, SOLUTION, CONCENTRATE INTRAVENOUS EVERY 5 MIN PRN
Status: CANCELLED | OUTPATIENT
Start: 2023-04-18

## 2023-04-18 RX ORDER — DIPHENHYDRAMINE HYDROCHLORIDE 50 MG/ML
50 INJECTION INTRAMUSCULAR; INTRAVENOUS
Status: CANCELLED
Start: 2023-04-18

## 2023-04-18 RX ORDER — MEPERIDINE HYDROCHLORIDE 25 MG/ML
25 INJECTION INTRAMUSCULAR; INTRAVENOUS; SUBCUTANEOUS EVERY 30 MIN PRN
Status: CANCELLED | OUTPATIENT
Start: 2023-04-18

## 2023-04-18 RX ORDER — HEPARIN SODIUM (PORCINE) LOCK FLUSH IV SOLN 100 UNIT/ML 100 UNIT/ML
5 SOLUTION INTRAVENOUS
Status: CANCELLED | OUTPATIENT
Start: 2023-04-18

## 2023-04-18 RX ORDER — MAGNESIUM OXIDE 400 MG/1
800 TABLET ORAL 2 TIMES DAILY
Qty: 120 TABLET | Refills: 3 | Status: SHIPPED | OUTPATIENT
Start: 2023-04-18 | End: 2023-06-21 | Stop reason: SINTOL

## 2023-04-18 RX ORDER — ALBUTEROL SULFATE 90 UG/1
1-2 AEROSOL, METERED RESPIRATORY (INHALATION)
Status: CANCELLED
Start: 2023-04-18

## 2023-04-18 RX ORDER — ALBUTEROL SULFATE 0.83 MG/ML
2.5 SOLUTION RESPIRATORY (INHALATION)
Status: CANCELLED | OUTPATIENT
Start: 2023-04-18

## 2023-04-18 NOTE — TELEPHONE ENCOUNTER
Patient Call: General  Route to LPN    Reason for call:Pt mom has mg infusion scheduled and wants to make sure its ok     Call back needed? Yes    Return Call Needed  Same as documented in contacts section  When to return call?: Greater than one day: Route standard priority

## 2023-04-18 NOTE — TELEPHONE ENCOUNTER
Message from Cy (liver fellow):    Jerson Valdez MD sent to Vicky Castro RN  Hi,   Patient asked about CTA coronary TOMORROW. He should not need this because this was ordered PREOP. This should be canceled.   Also, we prescribed him 5 days of lasix 40 po to help with bilateral leg edema. If the edema persists, we should consider MRV of liver to evaluate for venous outflow obstruction     I don't see that this was scheduled.  I spoke to his mom Leticia - she cancelled this on mychart last night.  Thanked her for her help!

## 2023-04-18 NOTE — TELEPHONE ENCOUNTER
Returned Leticia's call. No answer, but left a message that it is OK for Dillon to receive a magnesium infusion and to call back if any other questions.

## 2023-04-18 NOTE — TELEPHONE ENCOUNTER
Returned call to Karen in the [facility] imaging department. CTA was cancelled earlier today by surgery fellow. Imaging not needed, was ordered pre transplant.

## 2023-04-18 NOTE — TELEPHONE ENCOUNTER
Informed Leticia that [facility] does not have openings for IV tx. [facility] can get pt in onFriday, 1430, chair 44, 4/21/23. Staff will notify pt of appt.

## 2023-04-18 NOTE — TELEPHONE ENCOUNTER
Magnesium yesterday. 1.2 . Dr. Broussard asking for 3gm mag sulfate iv and to increase oral mag Ox to 800 mg po bid.  Leticia will increase oral Mag ox to 800 mg po bid starting now.  She would like to use [facility] infusion [place] for this but is unable to take Mc there today.  She would like to try for tomorrow.  I also discussed dietary changes to increase mag.   Therapy plan placed.  Apolonia vega to call [facility] to see if they can get Dillon in for infusion tomorrow (preferrably) or on Thursday.

## 2023-04-20 ENCOUNTER — TELEPHONE (OUTPATIENT)
Dept: TRANSPLANT | Facility: CLINIC | Age: 28
End: 2023-04-20

## 2023-04-20 NOTE — TELEPHONE ENCOUNTER
"Post Liver Transplant Team Conference  Date: 4/20/2023  Transplant Coordinator: Vicky Castro  Transplant Surgeon: Thelma Sterling      Attendees:  [] Dr. Hardy [] Dr. Mayorga []       [x] Dr. Rondon [x] Apolonia Peters LPN []    [] Dr. Nelson [] Kyra Mcnulty NP []    [] Dr. Sterling [] Vicky Peres NP []    [] Dr. Vega [x] Vicky Castro, RN []       [] Dr. Wood [x] Urmila Hill, RN []       [] Dr. Manuel Peoples [x] Claudia Bal, RN []       [] Dr. SAMANTHA Shaikh [x] Lelo Mccoy RN []       [x] Juan Castro, pharm [x] Lindsay Harper RN []       [] Dr. Guillen [x] Shelton Hicks, RN []         Verbal Plan Read Back:     Per Dr. Rondon, plan to add PETH to tomorrow's labs and monthly thereafter.     Call to Leticia, home care unable to get labs today, will get them drawn before IV magnesium infusion tomorrow. Let Leticia know that we would see what Dillon's mag level is on Monday in regards to her question about his oral mag supplement dosage and that he should continue his dose as ordered for now.     Leticia stated that Dillon has been wearing some compression stockings to help with edema and that the socks and lasix have helped. His edema is \"noticeably improved\".       Routed to RN Coordinator   Evy Pride RN    "

## 2023-04-21 ENCOUNTER — INFUSION THERAPY VISIT (OUTPATIENT)
Dept: INFUSION THERAPY | Facility: CLINIC | Age: 28
End: 2023-04-21
Attending: INTERNAL MEDICINE
Payer: COMMERCIAL

## 2023-04-21 VITALS
TEMPERATURE: 98.5 F | RESPIRATION RATE: 18 BRPM | HEART RATE: 76 BPM | OXYGEN SATURATION: 96 % | DIASTOLIC BLOOD PRESSURE: 94 MMHG | SYSTOLIC BLOOD PRESSURE: 123 MMHG

## 2023-04-21 DIAGNOSIS — E83.42 HYPOMAGNESEMIA: ICD-10-CM

## 2023-04-21 DIAGNOSIS — N17.9 ACUTE RENAL FAILURE, UNSPECIFIED ACUTE RENAL FAILURE TYPE (H): ICD-10-CM

## 2023-04-21 DIAGNOSIS — Z94.4 LIVER REPLACED BY TRANSPLANT (H): Primary | ICD-10-CM

## 2023-04-21 DIAGNOSIS — Z48.298 AFTERCARE FOLLOWING ORGAN TRANSPLANT: Primary | ICD-10-CM

## 2023-04-21 LAB
ALBUMIN SERPL-MCNC: 3.2 G/DL (ref 3.5–5)
ALP SERPL-CCNC: 119 U/L (ref 45–120)
ALT SERPL W P-5'-P-CCNC: 20 U/L (ref 0–45)
ANION GAP SERPL CALCULATED.3IONS-SCNC: 8 MMOL/L (ref 5–18)
AST SERPL W P-5'-P-CCNC: 26 U/L (ref 0–40)
BILIRUB DIRECT SERPL-MCNC: 0.3 MG/DL
BILIRUB SERPL-MCNC: 0.8 MG/DL (ref 0–1)
BUN SERPL-MCNC: 18 MG/DL (ref 8–22)
CALCIUM SERPL-MCNC: 9 MG/DL (ref 8.5–10.5)
CHLORIDE BLD-SCNC: 95 MMOL/L (ref 98–107)
CO2 SERPL-SCNC: 32 MMOL/L (ref 22–31)
CREAT SERPL-MCNC: 0.81 MG/DL (ref 0.7–1.3)
ERYTHROCYTE [DISTWIDTH] IN BLOOD BY AUTOMATED COUNT: 15.3 % (ref 10–15)
GFR SERPL CREATININE-BSD FRML MDRD: >90 ML/MIN/1.73M2
GLUCOSE BLD-MCNC: 85 MG/DL (ref 70–125)
HCT VFR BLD AUTO: 28.5 % (ref 40–53)
HGB BLD-MCNC: 9.3 G/DL (ref 13.3–17.7)
MAGNESIUM SERPL-MCNC: 1.5 MG/DL (ref 1.8–2.6)
MCH RBC QN AUTO: 31.2 PG (ref 26.5–33)
MCHC RBC AUTO-ENTMCNC: 32.6 G/DL (ref 31.5–36.5)
MCV RBC AUTO: 96 FL (ref 78–100)
PHOSPHATE SERPL-MCNC: 3.9 MG/DL (ref 2.5–4.5)
PLATELET # BLD AUTO: 175 10E3/UL (ref 150–450)
POTASSIUM BLD-SCNC: 5 MMOL/L (ref 3.5–5)
PROT SERPL-MCNC: 6.2 G/DL (ref 6–8)
RBC # BLD AUTO: 2.98 10E6/UL (ref 4.4–5.9)
SODIUM SERPL-SCNC: 135 MMOL/L (ref 136–145)
TACROLIMUS BLD-MCNC: 6.4 UG/L (ref 5–15)
TME LAST DOSE: NORMAL H
TME LAST DOSE: NORMAL H
WBC # BLD AUTO: 7.4 10E3/UL (ref 4–11)

## 2023-04-21 PROCEDURE — 250N000011 HC RX IP 250 OP 636: Performed by: SURGERY

## 2023-04-21 PROCEDURE — 83735 ASSAY OF MAGNESIUM: CPT

## 2023-04-21 PROCEDURE — 85027 COMPLETE CBC AUTOMATED: CPT

## 2023-04-21 PROCEDURE — 80197 ASSAY OF TACROLIMUS: CPT

## 2023-04-21 PROCEDURE — 36415 COLL VENOUS BLD VENIPUNCTURE: CPT

## 2023-04-21 PROCEDURE — 80053 COMPREHEN METABOLIC PANEL: CPT

## 2023-04-21 PROCEDURE — 258N000003 HC RX IP 258 OP 636: Performed by: SURGERY

## 2023-04-21 PROCEDURE — 96366 THER/PROPH/DIAG IV INF ADDON: CPT

## 2023-04-21 PROCEDURE — 82248 BILIRUBIN DIRECT: CPT

## 2023-04-21 PROCEDURE — 84100 ASSAY OF PHOSPHORUS: CPT

## 2023-04-21 PROCEDURE — 96365 THER/PROPH/DIAG IV INF INIT: CPT

## 2023-04-21 RX ORDER — DIPHENHYDRAMINE HYDROCHLORIDE 50 MG/ML
50 INJECTION INTRAMUSCULAR; INTRAVENOUS
Start: 2023-04-21

## 2023-04-21 RX ORDER — METHYLPREDNISOLONE SODIUM SUCCINATE 125 MG/2ML
125 INJECTION, POWDER, LYOPHILIZED, FOR SOLUTION INTRAMUSCULAR; INTRAVENOUS
Start: 2023-04-21

## 2023-04-21 RX ORDER — HEPARIN SODIUM (PORCINE) LOCK FLUSH IV SOLN 100 UNIT/ML 100 UNIT/ML
5 SOLUTION INTRAVENOUS
OUTPATIENT
Start: 2023-04-21

## 2023-04-21 RX ORDER — ALBUTEROL SULFATE 0.83 MG/ML
2.5 SOLUTION RESPIRATORY (INHALATION)
OUTPATIENT
Start: 2023-04-21

## 2023-04-21 RX ORDER — MEPERIDINE HYDROCHLORIDE 50 MG/ML
25 INJECTION INTRAMUSCULAR; INTRAVENOUS; SUBCUTANEOUS EVERY 30 MIN PRN
OUTPATIENT
Start: 2023-04-21

## 2023-04-21 RX ORDER — ALBUTEROL SULFATE 90 UG/1
1-2 AEROSOL, METERED RESPIRATORY (INHALATION)
Start: 2023-04-21

## 2023-04-21 RX ORDER — HEPARIN SODIUM,PORCINE 10 UNIT/ML
5 VIAL (ML) INTRAVENOUS
OUTPATIENT
Start: 2023-04-21

## 2023-04-21 RX ORDER — EPINEPHRINE 1 MG/ML
0.3 INJECTION, SOLUTION INTRAMUSCULAR; SUBCUTANEOUS EVERY 5 MIN PRN
OUTPATIENT
Start: 2023-04-21

## 2023-04-21 RX ADMIN — MAGNESIUM SULFATE HEPTAHYDRATE 3 G: 500 INJECTION, SOLUTION INTRAMUSCULAR; INTRAVENOUS at 14:15

## 2023-04-21 RX ADMIN — SODIUM CHLORIDE 250 ML: 9 INJECTION, SOLUTION INTRAVENOUS at 14:15

## 2023-04-21 NOTE — PROGRESS NOTES
Infusion Nursing Note:  Dillon Salter presents today for labs and mag infusion.    Patient seen by provider today: No   present during visit today: Not Applicable.    Note: Patient ambulated into clinic.      Intravenous Access:  Peripheral IV placed.    Treatment Conditions:  Lab Results   Component Value Date    HGB 9.3 (L) 04/21/2023    WBC 7.4 04/21/2023    ANEU 5.0 02/28/2023    ANEUTAUTO 4.7 04/17/2023     04/21/2023      Lab Results   Component Value Date     (L) 04/21/2023    POTASSIUM 5.0 04/21/2023    MAG 1.5 (L) 04/21/2023    CR 0.81 04/21/2023    SARTHAK 9.0 04/21/2023    BILITOTAL 0.8 04/21/2023    ALBUMIN 3.2 (L) 04/21/2023    ALT 20 04/21/2023    AST 26 04/21/2023         Post Infusion Assessment:  Patient tolerated infusion without incident.  Access discontinued per protocol.       Discharge Plan:   Patient discharged in stable condition accompanied by: self.  Departure Mode: Ambulatory.      Lor Patel RN

## 2023-04-24 ENCOUNTER — OFFICE VISIT (OUTPATIENT)
Dept: TRANSPLANT | Facility: CLINIC | Age: 28
End: 2023-04-24
Attending: SURGERY
Payer: COMMERCIAL

## 2023-04-24 ENCOUNTER — LAB REQUISITION (OUTPATIENT)
Dept: LAB | Facility: CLINIC | Age: 28
End: 2023-04-24
Payer: COMMERCIAL

## 2023-04-24 ENCOUNTER — TELEPHONE (OUTPATIENT)
Dept: BEHAVIORAL HEALTH | Facility: CLINIC | Age: 28
End: 2023-04-24

## 2023-04-24 ENCOUNTER — TELEPHONE (OUTPATIENT)
Dept: TRANSPLANT | Facility: CLINIC | Age: 28
End: 2023-04-24

## 2023-04-24 VITALS
WEIGHT: 151.8 LBS | TEMPERATURE: 98 F | BODY MASS INDEX: 25.26 KG/M2 | SYSTOLIC BLOOD PRESSURE: 134 MMHG | HEART RATE: 64 BPM | OXYGEN SATURATION: 97 % | DIASTOLIC BLOOD PRESSURE: 96 MMHG | RESPIRATION RATE: 16 BRPM

## 2023-04-24 DIAGNOSIS — Z94.4 LIVER REPLACED BY TRANSPLANT (H): Primary | ICD-10-CM

## 2023-04-24 DIAGNOSIS — K70.31 ALCOHOLIC CIRRHOSIS OF LIVER WITH ASCITES (H): Chronic | ICD-10-CM

## 2023-04-24 DIAGNOSIS — Z94.4 LIVER REPLACED BY TRANSPLANT (H): ICD-10-CM

## 2023-04-24 DIAGNOSIS — Z94.4 LIVER TRANSPLANT STATUS (H): ICD-10-CM

## 2023-04-24 LAB
ALBUMIN SERPL BCG-MCNC: 4.2 G/DL (ref 3.5–5.2)
ALP SERPL-CCNC: 145 U/L (ref 40–129)
ALT SERPL W P-5'-P-CCNC: 25 U/L (ref 10–50)
ANION GAP SERPL CALCULATED.3IONS-SCNC: 12 MMOL/L (ref 7–15)
AST SERPL W P-5'-P-CCNC: 31 U/L (ref 10–50)
BASOPHILS # BLD AUTO: 0.1 10E3/UL (ref 0–0.2)
BASOPHILS NFR BLD AUTO: 1 %
BILIRUB DIRECT SERPL-MCNC: 0.3 MG/DL (ref 0–0.3)
BILIRUB SERPL-MCNC: 0.8 MG/DL
BUN SERPL-MCNC: 20.4 MG/DL (ref 6–20)
CALCIUM SERPL-MCNC: 10.2 MG/DL (ref 8.6–10)
CHLORIDE SERPL-SCNC: 95 MMOL/L (ref 98–107)
CREAT SERPL-MCNC: 0.83 MG/DL (ref 0.67–1.17)
DEPRECATED HCO3 PLAS-SCNC: 31 MMOL/L (ref 22–29)
EOSINOPHIL # BLD AUTO: 0.2 10E3/UL (ref 0–0.7)
EOSINOPHIL NFR BLD AUTO: 3 %
ERYTHROCYTE [DISTWIDTH] IN BLOOD BY AUTOMATED COUNT: 15.1 % (ref 10–15)
GFR SERPL CREATININE-BSD FRML MDRD: >90 ML/MIN/1.73M2
GLUCOSE SERPL-MCNC: 41 MG/DL (ref 70–99)
HCT VFR BLD AUTO: 36.3 % (ref 40–53)
HGB BLD-MCNC: 11.3 G/DL (ref 13.3–17.7)
HOLD SPECIMEN: NORMAL
IMM GRANULOCYTES # BLD: 0 10E3/UL
IMM GRANULOCYTES NFR BLD: 0 %
LYMPHOCYTES # BLD AUTO: 1.1 10E3/UL (ref 0.8–5.3)
LYMPHOCYTES NFR BLD AUTO: 18 %
MAGNESIUM SERPL-MCNC: 1.5 MG/DL (ref 1.7–2.3)
MCH RBC QN AUTO: 30.9 PG (ref 26.5–33)
MCHC RBC AUTO-ENTMCNC: 31.1 G/DL (ref 31.5–36.5)
MCV RBC AUTO: 99 FL (ref 78–100)
MONOCYTES # BLD AUTO: 0.2 10E3/UL (ref 0–1.3)
MONOCYTES NFR BLD AUTO: 3 %
NEUTROPHILS # BLD AUTO: 4.7 10E3/UL (ref 1.6–8.3)
NEUTROPHILS NFR BLD AUTO: 75 %
NRBC # BLD AUTO: 0 10E3/UL
NRBC BLD AUTO-RTO: 0 /100
PHOSPHATE SERPL-MCNC: 3.9 MG/DL (ref 2.5–4.5)
PLATELET # BLD AUTO: 202 10E3/UL (ref 150–450)
POTASSIUM SERPL-SCNC: 5 MMOL/L (ref 3.4–5.3)
PROT SERPL-MCNC: 7.2 G/DL (ref 6.4–8.3)
RBC # BLD AUTO: 3.66 10E6/UL (ref 4.4–5.9)
SODIUM SERPL-SCNC: 138 MMOL/L (ref 136–145)
TACROLIMUS BLD-MCNC: 8.5 UG/L (ref 5–15)
TME LAST DOSE: NORMAL H
TME LAST DOSE: NORMAL H
WBC # BLD AUTO: 6.3 10E3/UL (ref 4–11)

## 2023-04-24 PROCEDURE — 80197 ASSAY OF TACROLIMUS: CPT | Mod: ORL | Performed by: SURGERY

## 2023-04-24 PROCEDURE — 84100 ASSAY OF PHOSPHORUS: CPT | Mod: ORL | Performed by: SURGERY

## 2023-04-24 PROCEDURE — G0463 HOSPITAL OUTPT CLINIC VISIT: HCPCS

## 2023-04-24 PROCEDURE — 82248 BILIRUBIN DIRECT: CPT | Mod: ORL | Performed by: SURGERY

## 2023-04-24 PROCEDURE — 83735 ASSAY OF MAGNESIUM: CPT | Mod: ORL | Performed by: SURGERY

## 2023-04-24 PROCEDURE — 85025 COMPLETE CBC W/AUTO DIFF WBC: CPT | Mod: ORL | Performed by: SURGERY

## 2023-04-24 PROCEDURE — 99024 POSTOP FOLLOW-UP VISIT: CPT | Performed by: SURGERY

## 2023-04-24 ASSESSMENT — PAIN SCALES - GENERAL: PAINLEVEL: NO PAIN (0)

## 2023-04-24 NOTE — NURSING NOTE
Chief Complaint   Patient presents with     Follow Up     Liver        BP (!) 134/96 (BP Location: Right arm, Patient Position: Sitting, Cuff Size: Adult Regular)   Pulse 64   Temp 98  F (36.7  C) (Oral)   Resp 16   Wt 68.9 kg (151 lb 12.8 oz)   SpO2 97%   BMI 25.26 kg/m      William Chaves RN on 4/24/2023 at 1:59 PM

## 2023-04-24 NOTE — TELEPHONE ENCOUNTER
"\"I saw this patient in clinic with Dr. Sterling and the following adjustments were made: increase Tacrolimus to 2.5mg BID and give Lasix 20mg oral for five days. Patient can be seen by PA/NP for his next appointment which should be in two weeks. Dr. Sterling wants him to be seen every two weeks.     Best regards,     Michael Duran\"      Call to Leticia to confirm med changes that were made and to see if they need prescriptions updated and sent to pharmacy. M to call back.   "

## 2023-04-24 NOTE — LETTER
2023         RE: Dillon Salter  [address] [facility] [address]        Dear Colleague,    Thank you for referring your patient, Dillon Salter, to the [facility]. Please see a copy of my visit note below.    Transplant Surgery Consult  2023    Dillon Salter  : 1995    Date of Service: 2023 1:40 PM    Assessment and Plan:  Dillon Salter is a 27 year old male s/p DDLT on 23 with Dr. Sterling. Continues to have good graft function with good synthetic function. Still has residual LE edema that causes discomfort in his feet. Last Tac level of 6.4 on  and last Cr of 0.81.     - increase Tac to 2.5mg BID  - Lasix 20mg oral daily x 5 days   - please follow up in clinic with PA/NP in two weeks with labs    Discussed with Dr. Hallie Duran,   General Surgery, PGY-1  [phone]    Subjective: During last visit on , patient had bilateral LE edema for which he was given a five day course of Lasix 40mg PO daily as well as bilateral LE stockings. Pt down 12 pounds since after starting on Lasix, but continues to have discomfort in his feet with ambulation. Pt used compression stockings for 6-8 hours a day with last use on . Pt reports decreasing LE edema b/l. He denies any N/V/D, dyspnea, orthopnea, or chest pain.       Transplant History  Transplant Type:  Liver Tx  Donor Type:    DBD   Transplant Date:  2023 (Liver)     Acute Rejection Hx:  No  Present Maintenance Immunosuppression:  Tacrolimus 2.0mg bid, 5mg prednisone daily and Mycophenolate mofetil 750 bid  CMV IgG Ab Discordance:  No  EBV IgG Ab Discordance:  Yes  BK Viremia: n/a    Past Medical History:  Past Medical History:   Diagnosis Date    Acute on chronic anemia 2022    Alcohol use disorder     Alcohol use disorder, severe, dependence (H) 2022    Alcoholic cirrhosis of liver with ascites (H)     Alcoholic hepatitis     Autism spectrum disorder  04/08/2022    High functioning autistic.  26-year-old history of autism/Asperger's on the spectrum graduated high school at [employer] worked at [employer] and then another  place until the Covid epidemic in 2020 lives with parents (Leticia and Wes) socially isolated drinks alcohol beer daily     Benign essential hypertension     Bleeding esophageal varices (H) 06/18/2022    Hepatic encephalopathy (H)     Hyponatremia 07/28/2022    Major depressive disorder     Type II Diabetes      Past Surgical History  Past Surgical History:   Procedure Laterality Date    BENCH LIVER  2/28/2023    Procedure: Bench liver;  Surgeon: Thelma Stelring MD;  Location: [facility]    COLONOSCOPY N/A 1/27/2023    Procedure: Colonoscopy;  Surgeon: Osman Montoya MD;  Location: [facility]    ENDOSCOPIC ULTRASOUND LOWER GASTROINTESTIONAL TRACT (GI) N/A 1/27/2023    Procedure: ULTRASOUND, LOWER GI TRACT, ENDOSCOPIC with glueing;  Surgeon: Osman Montoya MD;  Location: [facility]    ESOPHAGOSCOPY, GASTROSCOPY, DUODENOSCOPY (EGD), COMBINED N/A 5/24/2022    Procedure: ESOPHAGOGASTRODUODENOSCOPY (EGD) with esophageal banding;  Surgeon: Gary Hurst MD;  Location: [facility]    ESOPHAGOSCOPY, GASTROSCOPY, DUODENOSCOPY (EGD), COMBINED N/A 6/20/2022    Procedure: ESOPHAGOGASTRODUODENOSCOPY;  Surgeon: Gavino Reza MD;  Location: [facility]    ESOPHAGOSCOPY, GASTROSCOPY, DUODENOSCOPY (EGD), COMBINED N/A 1/23/2023    Procedure: ESOPHAGOGASTRODUODENOSCOPY (EGD) with esophageal variceal banding;  Surgeon: Juan Boo MD;  Location: [facility]    ESOPHAGOSCOPY, GASTROSCOPY, DUODENOSCOPY (EGD), COMBINED N/A 1/25/2023    Procedure: ESOPHAGOGASTRODUODENOSCOPY (EGD);  Surgeon: Juan Boo MD;  Location: [facility]    ESOPHAGOSCOPY, GASTROSCOPY, DUODENOSCOPY (EGD), COMBINED N/A 1/27/2023    Procedure: Esophagoscopy, gastroscopy, duodenoscopy (EGD), combined;  Surgeon: Osman Montoya MD;  Location: [facility]     ESOPHAGOSCOPY, GASTROSCOPY, DUODENOSCOPY (EGD), COMBINED N/A 2023    Procedure: ESOPHAGOGASTRODUODENOSCOPY (EGD);  Surgeon: Leventhal, Thomas Michael, MD;  Location: [facility]    IR CVC TUNNEL PLACEMENT > 5 YRS OF AGE  3/10/2023    IR CVC TUNNEL REMOVAL RIGHT  2023    MIDLINE DOUBLE LUMEN PLACEMENT  2022         PICC TRIPLE LUMEN PLACEMENT  2022         RETURN LIVER TRANSPLANT N/A 3/1/2023    Procedure: RETURN TO OPERATING ROOM, AFTER LIVER TRANSPLANT;  Surgeon: Thelma Sterling MD;  Location: [facility]    TRANSPLANT LIVER RECIPIENT  DONOR N/A 2023    Procedure: Transplant liver recipient  donor;  Surgeon: Thelma Sterling MD;  Location: [facility]     Family History:  Family History   Problem Relation Age of Onset    Hypertension Mother     Substance Abuse Mother     Thyroid Disease Mother     Heart Failure Father     Substance Abuse Father     Chronic Obstructive Pulmonary Disease Father     Substance Abuse Maternal Grandfather      Social History:  Social History     Socioeconomic History    Marital status: Single     Spouse name: Not on file    Number of children: Not on file    Years of education: Not on file    Highest education level: Not on file   Occupational History    Not on file   Tobacco Use    Smoking status: Former    Smokeless tobacco: Never    Tobacco comments:     A pack lasted a year, hardly smoked   Vaping Use    Vaping status: Former   Substance and Sexual Activity    Alcohol use: Not Currently     Comment: No ETOH since 20    Drug use: Not Currently     Types: Marijuana    Sexual activity: Not on file   Other Topics Concern    Not on file   Social History Narrative    26-year-old history of autism/Asperger's on the spectrum graduated high school at [employer] worked at [employer] and then another  place until the Covid epidemic in 2020 lives with parents (Leticia and Wes) socially isolated drinks alcohol beer daily        End-stage cirrhosis noted on admission to   April 2022     Social Determinants of Health     Financial Resource Strain: Not on file   Food Insecurity: Not on file   Transportation Needs: Not on file   Physical Activity: Not on file   Stress: Not on file   Social Connections: Not on file   Intimate Partner Violence: Not on file   Housing Stability: Not on file     Medications:  Current Outpatient Medications   Medication Sig Dispense Refill    acetaminophen (TYLENOL) 325 MG tablet 2 tablets (650 mg) by Oral or Feeding Tube route every 8 hours as needed for mild pain or fever 100 tablet 0    aspirin (ASA) 81 MG EC tablet Take 1 tablet (81 mg) by mouth daily 30 tablet 0    FLUoxetine (PROZAC) 20 MG capsule Take 3 capsules (60 mg) by mouth At Bedtime 90 capsule 0    insulin aspart (NOVOLOG FLEXPEN) 100 UNIT/ML pen 1 unit per 10 grams of carb, plus additional units based on following scale   For Pre-Meal  - 164 give 1 unit. For Pre-Meal  - 189 give 2 units. For Pre-Meal  - 214 give 3 units. For Pre-Meal  - 239 give 4 units. For Pre-Meal  - 264 give 5 units. For Pre-Meal  - 289 give 6 units. For Pre-Meal  - 314 give 7 units. For Pre-Meal  - 339 give 8 units. For Pre-Meal  - 364 give 9 units.  For Pre-Meal BG greater than or equal to 365 give 10 units (Patient taking differently: 1-10 Units 1 unit per 10 grams of carb, plus additional units based on following scale   For Pre-Meal  - 164 give 1 unit. For Pre-Meal  - 189 give 2 units. For Pre-Meal  - 214 give 3 units. For Pre-Meal  - 239 give 4 units. For Pre-Meal  - 264 give 5 units. For Pre-Meal  - 289 give 6 units. For Pre-Meal  - 314 give 7 units. For Pre-Meal  - 339 give 8 units. For Pre-Meal  - 364 give 9 units.  For Pre-Meal BG greater than or equal to 365 give 10 units) 15 mL 3    insulin glargine (LANTUS PEN) 100 UNIT/ML pen Inject 15 Units Subcutaneous every morning AND 10 Units At Bedtime. 15 mL 0     Lidocaine (LIDOCARE) 4 % Patch Place 2 patches onto the skin every 24 hours To prevent lidocaine toxicity, patient should be patch free for 12 hrs daily. 15 patch 0    magnesium oxide (MAG-OX) 400 MG tablet Take 2 tablets (800 mg) by mouth 2 times daily 120 tablet 3    metoprolol tartrate (LOPRESSOR) 25 MG tablet Take 1 tablet (25 mg) by mouth 2 times daily 60 tablet 1    multivitamin RENAL (RENAVITE RX/NEPHROVITE) 1 MG tablet Take 1 tablet by mouth daily 90 tablet 3    mycophenolate (GENERIC EQUIVALENT) 250 MG capsule Take 3 capsules (750 mg) by mouth 2 times daily 540 capsule 3    ondansetron (ZOFRAN ODT) 4 MG ODT tab Take 1 tablet (4 mg) by mouth every 6 hours as needed for nausea or vomiting 10 tablet 0    oxyCODONE (ROXICODONE) 5 MG tablet Take 5 mg by mouth every 6 hours as needed for severe pain From PCP      pantoprazole (PROTONIX) 40 MG EC tablet Take 1 tablet (40 mg) by mouth 2 times daily 180 tablet 3    predniSONE (DELTASONE) 5 MG tablet Take 1 tablet (5 mg) by mouth daily 90 tablet 1    sulfamethoxazole-trimethoprim (BACTRIM) 400-80 MG tablet Take 1 tablet by mouth three times a week Increase as instructed per transplant team 36 tablet 3    tacrolimus (GENERIC EQUIVALENT) 0.5 MG capsule Take 1 capsule (0.5 mg) by mouth 2 times daily Take 1 cap by mouth twice daily as directed by Transplant Center for dose changes. (Patient not taking: Reported on 4/11/2023) 60 capsule 0    tacrolimus (GENERIC EQUIVALENT) 1 MG capsule Take 2 capsules (2 mg) by mouth 2 times daily 360 capsule 3    ursodiol (ACTIGALL) 300 MG capsule Take 1 capsule (300 mg) by mouth 2 times daily 180 capsule 3    valGANciclovir (VALCYTE) 450 MG tablet Take 1 tablet (450 mg) by mouth twice a week Increase to 450mg daily when directed by transplant team with improving renal function 30 tablet 2    Vitamin D3 (CHOLECALCIFEROL) 25 mcg (1000 units) tablet 1 tablet (25 mcg) by Oral or Feeding Tube route daily 90 tablet 3     Allergies:   No  Known Allergies    Review of Symptoms:  A 10 point review of symptoms has been conducted and is negative except for that mentioned in the above HPI.    Physical Exam:    There were no vitals taken for this visit.  GENERAL: NAD, resting comfortably in bed  HEENT: atraumatic, normocephalic, no scleral icterus  CARDIO: normal rate and regular rhythm  PULM: no increased WOB  ABD: non-distended, soft, and non-tender  INCISION: C/D/I  EXT: L>R LE edema, +1 edema in mid LLE, +1 edema in R ankle and dorsum of foot  NEURO: CN II-XII grossly intact, no focal neuro deficits  PSYCH: appropriate affect and behavior    Labs:  Liver Function Studies - Recent Labs   Lab Test 04/21/23  1259   PROTTOTAL 6.2   ALBUMIN 3.2*   BILITOTAL 0.8   ALKPHOS 119   AST 26   ALT 20     CBC RESULTS:   Recent Labs   Lab Test 04/21/23  1259   WBC 7.4   RBC 2.98*   HGB 9.3*   HCT 28.5*   MCV 96   MCH 31.2   MCHC 32.6   RDW 15.3*        Last Tac level 6.4 on 4/21/23    Last Basic Metabolic Panel:  Lab Results   Component Value Date     04/21/2023      Lab Results   Component Value Date    POTASSIUM 5.0 04/21/2023     Lab Results   Component Value Date    CHLORIDE 95 04/21/2023     Lab Results   Component Value Date    SARTHAK 9.0 04/21/2023     Lab Results   Component Value Date    CO2 32 04/21/2023     Lab Results   Component Value Date    BUN 18 04/21/2023     Lab Results   Component Value Date    CR 0.81 04/21/2023     Lab Results   Component Value Date    GLC 85 04/21/2023       Attestation signed by Jerson Valdez MD at 7/24/2023  4:06 PM:  I agree with this resident's history and assessment. This was discussed with Dr. Sterling.       Again, thank you for allowing me to participate in the care of your patient.        Sincerely,         [facility] SURG FELLOW 1

## 2023-04-24 NOTE — PROGRESS NOTES
Transplant Surgery Consult  2023    Dillon Salter  : 1995    Date of Service: 2023 1:40 PM    Assessment and Plan:  Dillon Salter is a 27 year old male s/p DDLT on 23 with Dr. Sterling. Continues to have good graft function with good synthetic function. Still has residual LE edema that causes discomfort in his feet. Last Tac level of 6.4 on  and last Cr of 0.81.     - increase Tac to 2.5mg BID  - Lasix 20mg oral daily x 5 days   - please follow up in clinic with PA/NP in two weeks with labs    Discussed with Dr. Hallie Duran,   General Surgery, PGY-1  [phone]    Subjective: During last visit on , patient had bilateral LE edema for which he was given a five day course of Lasix 40mg PO daily as well as bilateral LE stockings. Pt down 12 pounds since after starting on Lasix, but continues to have discomfort in his feet with ambulation. Pt used compression stockings for 6-8 hours a day with last use on . Pt reports decreasing LE edema b/l. He denies any N/V/D, dyspnea, orthopnea, or chest pain.       Transplant History  Transplant Type:  Liver Tx  Donor Type:    DBD   Transplant Date:  2023 (Liver)     Acute Rejection Hx:  No  Present Maintenance Immunosuppression:  Tacrolimus 2.0mg bid, 5mg prednisone daily and Mycophenolate mofetil 750 bid  CMV IgG Ab Discordance:  No  EBV IgG Ab Discordance:  Yes  BK Viremia: n/a    Past Medical History:  Past Medical History:   Diagnosis Date     Acute on chronic anemia 2022     Alcohol use disorder      Alcohol use disorder, severe, dependence (H) 2022     Alcoholic cirrhosis of liver with ascites (H)      Alcoholic hepatitis      Autism spectrum disorder 2022    High functioning autistic.  26-year-old history of autism/Asperger's on the spectrum graduated high school at [employer] worked at [employer] and then another  place until the Covid epidemic in  lives with parents (Leticia and Wes)  socially isolated drinks alcohol beer daily      Benign essential hypertension      Bleeding esophageal varices (H) 06/18/2022     Hepatic encephalopathy (H)      Hyponatremia 07/28/2022     Major depressive disorder      Type II Diabetes      Past Surgical History  Past Surgical History:   Procedure Laterality Date     BENCH LIVER  2/28/2023    Procedure: Bench liver;  Surgeon: Thelma Sterling MD;  [facility]     COLONOSCOPY N/A 1/27/2023    Procedure: Colonoscopy;  Surgeon: Osman Montoya MD;  [facility]     ENDOSCOPIC ULTRASOUND LOWER GASTROINTESTIONAL TRACT (GI) N/A 1/27/2023    Procedure: ULTRASOUND, LOWER GI TRACT, ENDOSCOPIC with glueing;  Surgeon: Osman Montoya MD;  [facility]     ESOPHAGOSCOPY, GASTROSCOPY, DUODENOSCOPY (EGD), COMBINED N/A 5/24/2022    Procedure: ESOPHAGOGASTRODUODENOSCOPY (EGD) with esophageal banding;  Surgeon: Gary Hurst MD;  Location: [facility] Main OR     ESOPHAGOSCOPY, GASTROSCOPY, DUODENOSCOPY (EGD), COMBINED N/A 6/20/2022    Procedure: ESOPHAGOGASTRODUODENOSCOPY;  Surgeon: Gavino Reza MD;  Location: [facility] Main OR     ESOPHAGOSCOPY, GASTROSCOPY, DUODENOSCOPY (EGD), COMBINED N/A 1/23/2023    Procedure: ESOPHAGOGASTRODUODENOSCOPY (EGD) with esophageal variceal banding;  Surgeon: Juan Boo MD;  [facility] Main OR     ESOPHAGOSCOPY, GASTROSCOPY, DUODENOSCOPY (EGD), COMBINED N/A 1/25/2023    Procedure: ESOPHAGOGASTRODUODENOSCOPY (EGD);  Surgeon: Juan Boo MD;  Location: [facility] Main OR     ESOPHAGOSCOPY, GASTROSCOPY, DUODENOSCOPY (EGD), COMBINED N/A 1/27/2023    Procedure: Esophagoscopy, gastroscopy, duodenoscopy (EGD), combined;  Surgeon: Osman Montoya MD;  [facility]     ESOPHAGOSCOPY, GASTROSCOPY, DUODENOSCOPY (EGD), COMBINED N/A 2/12/2023    Procedure: ESOPHAGOGASTRODUODENOSCOPY (EGD);  Surgeon: Leventhal, Thomas Michael, MD;  Location: [facility] OR     IR CVC TUNNEL PLACEMENT > 5 YRS OF AGE  3/10/2023     IR  CVC TUNNEL REMOVAL RIGHT  2023     MIDLINE DOUBLE LUMEN PLACEMENT  2022          PICC TRIPLE LUMEN PLACEMENT  2022          RETURN LIVER TRANSPLANT N/A 3/1/2023    Procedure: RETURN TO OPERATING ROOM, AFTER LIVER TRANSPLANT;  Surgeon: Thelma Sterling MD;  [facility]: [facility] OR     TRANSPLANT LIVER RECIPIENT  DONOR N/A 2023    Procedure: Transplant liver recipient  donor;  Surgeon: Thelma Sterling MD;  [facility]     Family History:  Family History   Problem Relation Age of Onset     Hypertension Mother      Substance Abuse Mother      Thyroid Disease Mother      Heart Failure Father      Substance Abuse Father      Chronic Obstructive Pulmonary Disease Father      Substance Abuse Maternal Grandfather      Social History:  Social History     Socioeconomic History     Marital status: Single     Spouse name: Not on file     Number of children: Not on file     Years of education: Not on file     Highest education level: Not on file   Occupational History     Not on file   Tobacco Use     Smoking status: Former     Smokeless tobacco: Never     Tobacco comments:     A pack lasted a year, hardly smoked   Vaping Use     Vaping status: Former   Substance and Sexual Activity     Alcohol use: Not Currently     Comment: No ETOH since 20     Drug use: Not Currently     Types: Marijuana     Sexual activity: Not on file   Other Topics Concern     Not on file   Social History Narrative    26-year-old history of autism/Asperger's on the spectrum graduated high school at [employer] worked at  and then another  place until the Covid epidemic in 2020 lives with parents (Leticia and Wes) socially isolated drinks alcohol beer daily        End-stage cirrhosis noted on admission to  2022     Social Determinants of Health     Financial Resource Strain: Not on file   Food Insecurity: Not on file   Transportation Needs: Not on file   Physical Activity: Not on file   Stress: Not on  file   Social Connections: Not on file   Intimate Partner Violence: Not on file   Housing Stability: Not on file     Medications:  Current Outpatient Medications   Medication Sig Dispense Refill     acetaminophen (TYLENOL) 325 MG tablet 2 tablets (650 mg) by Oral or Feeding Tube route every 8 hours as needed for mild pain or fever 100 tablet 0     aspirin (ASA) 81 MG EC tablet Take 1 tablet (81 mg) by mouth daily 30 tablet 0     FLUoxetine (PROZAC) 20 MG capsule Take 3 capsules (60 mg) by mouth At Bedtime 90 capsule 0     insulin aspart (NOVOLOG FLEXPEN) 100 UNIT/ML pen 1 unit per 10 grams of carb, plus additional units based on following scale   For Pre-Meal  - 164 give 1 unit. For Pre-Meal  - 189 give 2 units. For Pre-Meal  - 214 give 3 units. For Pre-Meal  - 239 give 4 units. For Pre-Meal  - 264 give 5 units. For Pre-Meal  - 289 give 6 units. For Pre-Meal  - 314 give 7 units. For Pre-Meal  - 339 give 8 units. For Pre-Meal  - 364 give 9 units.  For Pre-Meal BG greater than or equal to 365 give 10 units (Patient taking differently: 1-10 Units 1 unit per 10 grams of carb, plus additional units based on following scale   For Pre-Meal  - 164 give 1 unit. For Pre-Meal  - 189 give 2 units. For Pre-Meal  - 214 give 3 units. For Pre-Meal  - 239 give 4 units. For Pre-Meal  - 264 give 5 units. For Pre-Meal  - 289 give 6 units. For Pre-Meal  - 314 give 7 units. For Pre-Meal  - 339 give 8 units. For Pre-Meal  - 364 give 9 units.  For Pre-Meal BG greater than or equal to 365 give 10 units) 15 mL 3     insulin glargine (LANTUS PEN) 100 UNIT/ML pen Inject 15 Units Subcutaneous every morning AND 10 Units At Bedtime. 15 mL 0     Lidocaine (LIDOCARE) 4 % Patch Place 2 patches onto the skin every 24 hours To prevent lidocaine toxicity, patient should be patch free for 12 hrs daily. 15 patch 0     magnesium oxide (MAG-OX) 400  MG tablet Take 2 tablets (800 mg) by mouth 2 times daily 120 tablet 3     metoprolol tartrate (LOPRESSOR) 25 MG tablet Take 1 tablet (25 mg) by mouth 2 times daily 60 tablet 1     multivitamin RENAL (RENAVITE RX/NEPHROVITE) 1 MG tablet Take 1 tablet by mouth daily 90 tablet 3     mycophenolate (GENERIC EQUIVALENT) 250 MG capsule Take 3 capsules (750 mg) by mouth 2 times daily 540 capsule 3     ondansetron (ZOFRAN ODT) 4 MG ODT tab Take 1 tablet (4 mg) by mouth every 6 hours as needed for nausea or vomiting 10 tablet 0     oxyCODONE (ROXICODONE) 5 MG tablet Take 5 mg by mouth every 6 hours as needed for severe pain From PCP       pantoprazole (PROTONIX) 40 MG EC tablet Take 1 tablet (40 mg) by mouth 2 times daily 180 tablet 3     predniSONE (DELTASONE) 5 MG tablet Take 1 tablet (5 mg) by mouth daily 90 tablet 1     sulfamethoxazole-trimethoprim (BACTRIM) 400-80 MG tablet Take 1 tablet by mouth three times a week Increase as instructed per transplant team 36 tablet 3     tacrolimus (GENERIC EQUIVALENT) 0.5 MG capsule Take 1 capsule (0.5 mg) by mouth 2 times daily Take 1 cap by mouth twice daily as directed by Transplant Center for dose changes. (Patient not taking: Reported on 4/11/2023) 60 capsule 0     tacrolimus (GENERIC EQUIVALENT) 1 MG capsule Take 2 capsules (2 mg) by mouth 2 times daily 360 capsule 3     ursodiol (ACTIGALL) 300 MG capsule Take 1 capsule (300 mg) by mouth 2 times daily 180 capsule 3     valGANciclovir (VALCYTE) 450 MG tablet Take 1 tablet (450 mg) by mouth twice a week Increase to 450mg daily when directed by transplant team with improving renal function 30 tablet 2     Vitamin D3 (CHOLECALCIFEROL) 25 mcg (1000 units) tablet 1 tablet (25 mcg) by Oral or Feeding Tube route daily 90 tablet 3     Allergies:   No Known Allergies    Review of Symptoms:  A 10 point review of symptoms has been conducted and is negative except for that mentioned in the above HPI.    Physical Exam:    There were no  vitals taken for this visit.  GENERAL: NAD, resting comfortably in bed  HEENT: atraumatic, normocephalic, no scleral icterus  CARDIO: normal rate and regular rhythm  PULM: no increased WOB  ABD: non-distended, soft, and non-tender  INCISION: C/D/I  EXT: L>R LE edema, +1 edema in mid LLE, +1 edema in R ankle and dorsum of foot  NEURO: CN II-XII grossly intact, no focal neuro deficits  PSYCH: appropriate affect and behavior    Labs:  Liver Function Studies - Recent Labs   Lab Test 04/21/23  1259   PROTTOTAL 6.2   ALBUMIN 3.2*   BILITOTAL 0.8   ALKPHOS 119   AST 26   ALT 20     CBC RESULTS:   Recent Labs   Lab Test 04/21/23  1259   WBC 7.4   RBC 2.98*   HGB 9.3*   HCT 28.5*   MCV 96   MCH 31.2   MCHC 32.6   RDW 15.3*        Last Tac level 6.4 on 4/21/23    Last Basic Metabolic Panel:  Lab Results   Component Value Date     04/21/2023      Lab Results   Component Value Date    POTASSIUM 5.0 04/21/2023     Lab Results   Component Value Date    CHLORIDE 95 04/21/2023     Lab Results   Component Value Date    SARTHAK 9.0 04/21/2023     Lab Results   Component Value Date    CO2 32 04/21/2023     Lab Results   Component Value Date    BUN 18 04/21/2023     Lab Results   Component Value Date    CR 0.81 04/21/2023     Lab Results   Component Value Date    GLC 85 04/21/2023

## 2023-04-24 NOTE — TELEPHONE ENCOUNTER
Call to [facility] regarding PETH test for this week, coordinator at [facility] will be sure that it is drawn today when RN draws his labs.

## 2023-04-25 RX ORDER — TACROLIMUS 0.5 MG/1
0.5 CAPSULE ORAL 2 TIMES DAILY
Qty: 180 CAPSULE | Refills: 3 | Status: SHIPPED | OUTPATIENT
Start: 2023-04-25 | End: 2023-05-01

## 2023-04-25 RX ORDER — SULFAMETHOXAZOLE AND TRIMETHOPRIM 400; 80 MG/1; MG/1
1 TABLET ORAL
Qty: 36 TABLET | Refills: 3 | Status: SHIPPED | OUTPATIENT
Start: 2023-04-26 | End: 2024-01-05

## 2023-04-25 RX ORDER — TACROLIMUS 1 MG/1
2 CAPSULE ORAL 2 TIMES DAILY
Qty: 360 CAPSULE | Refills: 3 | Status: SHIPPED | OUTPATIENT
Start: 2023-04-25 | End: 2023-05-01

## 2023-04-25 NOTE — TELEPHONE ENCOUNTER
Call to Leticia. She was finishing up an appointment and will call back when she is done.     Leticia called back. Discussed glucose of 41 yesterday, Dillon had just woken up when nurse arrived to draw labs and he had not eaten yet. After eating and taking lantus, Leticia said his glucose was back to 150s. Also discussed tacro increase and lasix x5 days. Leticia stated that Dillon does not need lasix prescription sent, but does need a refill of tacrolimus and bactrim. Prescriptions sent to pharmacy.

## 2023-04-26 ENCOUNTER — LAB REQUISITION (OUTPATIENT)
Dept: LAB | Facility: CLINIC | Age: 28
End: 2023-04-26
Payer: COMMERCIAL

## 2023-04-26 ENCOUNTER — TELEPHONE (OUTPATIENT)
Dept: TRANSPLANT | Facility: CLINIC | Age: 28
End: 2023-04-26

## 2023-04-26 DIAGNOSIS — Z94.4 LIVER REPLACED BY TRANSPLANT (H): Primary | ICD-10-CM

## 2023-04-26 DIAGNOSIS — Z94.4 LIVER TRANSPLANT STATUS (H): ICD-10-CM

## 2023-04-26 LAB
ALBUMIN SERPL-MCNC: 3.5 G/DL (ref 3.5–5)
ALP SERPL-CCNC: 134 U/L (ref 45–120)
ALT SERPL W P-5'-P-CCNC: 25 U/L (ref 0–45)
ANION GAP SERPL CALCULATED.3IONS-SCNC: 5 MMOL/L (ref 5–18)
AST SERPL W P-5'-P-CCNC: 24 U/L (ref 0–40)
BASOPHILS # BLD AUTO: 0.1 10E3/UL (ref 0–0.2)
BASOPHILS NFR BLD AUTO: 1 %
BILIRUB DIRECT SERPL-MCNC: 0.3 MG/DL
BILIRUB SERPL-MCNC: 0.8 MG/DL (ref 0–1)
BUN SERPL-MCNC: 23 MG/DL (ref 8–22)
CALCIUM SERPL-MCNC: 10.1 MG/DL (ref 8.5–10.5)
CHLORIDE BLD-SCNC: 96 MMOL/L (ref 98–107)
CO2 SERPL-SCNC: 35 MMOL/L (ref 22–31)
CREAT SERPL-MCNC: 1.05 MG/DL (ref 0.7–1.3)
EOSINOPHIL # BLD AUTO: 0.1 10E3/UL (ref 0–0.7)
EOSINOPHIL NFR BLD AUTO: 2 %
ERYTHROCYTE [DISTWIDTH] IN BLOOD BY AUTOMATED COUNT: 14.6 % (ref 10–15)
GFR SERPL CREATININE-BSD FRML MDRD: >90 ML/MIN/1.73M2
GLUCOSE BLD-MCNC: 184 MG/DL (ref 70–125)
HCT VFR BLD AUTO: 31.6 % (ref 40–53)
HGB BLD-MCNC: 10.3 G/DL (ref 13.3–17.7)
IMM GRANULOCYTES # BLD: 0 10E3/UL
IMM GRANULOCYTES NFR BLD: 1 %
LYMPHOCYTES # BLD AUTO: 1.2 10E3/UL (ref 0.8–5.3)
LYMPHOCYTES NFR BLD AUTO: 20 %
MAGNESIUM SERPL-MCNC: 1.5 MG/DL (ref 1.8–2.6)
MCH RBC QN AUTO: 31 PG (ref 26.5–33)
MCHC RBC AUTO-ENTMCNC: 32.6 G/DL (ref 31.5–36.5)
MCV RBC AUTO: 95 FL (ref 78–100)
MONOCYTES # BLD AUTO: 0.3 10E3/UL (ref 0–1.3)
MONOCYTES NFR BLD AUTO: 5 %
NEUTROPHILS # BLD AUTO: 4.3 10E3/UL (ref 1.6–8.3)
NEUTROPHILS NFR BLD AUTO: 71 %
NRBC # BLD AUTO: 0 10E3/UL
NRBC BLD AUTO-RTO: 0 /100
PHOSPHATE SERPL-MCNC: 3.8 MG/DL (ref 2.5–4.5)
PLATELET # BLD AUTO: 171 10E3/UL (ref 150–450)
POTASSIUM BLD-SCNC: 5.3 MMOL/L (ref 3.5–5)
PROT SERPL-MCNC: 6.3 G/DL (ref 6–8)
RBC # BLD AUTO: 3.32 10E6/UL (ref 4.4–5.9)
SODIUM SERPL-SCNC: 136 MMOL/L (ref 136–145)
TACROLIMUS BLD-MCNC: 7.4 UG/L (ref 5–15)
TME LAST DOSE: NORMAL H
TME LAST DOSE: NORMAL H
WBC # BLD AUTO: 6 10E3/UL (ref 4–11)

## 2023-04-26 PROCEDURE — 84100 ASSAY OF PHOSPHORUS: CPT | Performed by: TRANSPLANT SURGERY

## 2023-04-26 PROCEDURE — 83735 ASSAY OF MAGNESIUM: CPT | Performed by: TRANSPLANT SURGERY

## 2023-04-26 PROCEDURE — 85025 COMPLETE CBC W/AUTO DIFF WBC: CPT | Mod: ORL | Performed by: STUDENT IN AN ORGANIZED HEALTH CARE EDUCATION/TRAINING PROGRAM

## 2023-04-26 PROCEDURE — 80197 ASSAY OF TACROLIMUS: CPT | Mod: ORL | Performed by: STUDENT IN AN ORGANIZED HEALTH CARE EDUCATION/TRAINING PROGRAM

## 2023-04-26 PROCEDURE — 80053 COMPREHEN METABOLIC PANEL: CPT | Mod: ORL | Performed by: STUDENT IN AN ORGANIZED HEALTH CARE EDUCATION/TRAINING PROGRAM

## 2023-04-26 PROCEDURE — 82248 BILIRUBIN DIRECT: CPT | Mod: ORL | Performed by: STUDENT IN AN ORGANIZED HEALTH CARE EDUCATION/TRAINING PROGRAM

## 2023-04-26 PROCEDURE — 36415 COLL VENOUS BLD VENIPUNCTURE: CPT

## 2023-04-26 NOTE — TELEPHONE ENCOUNTER
Spoke to Leticia regarding Dillon's creatinine and potassium. Reminded to ensure Dillon is following low K diet and drinking adequate fluids. Waiting on tac level from today, will update if dose change needed. Leticia verbalized understanding.

## 2023-04-27 ENCOUNTER — VIRTUAL VISIT (OUTPATIENT)
Dept: PHARMACY | Facility: CLINIC | Age: 28
End: 2023-04-27
Payer: COMMERCIAL

## 2023-04-27 ENCOUNTER — DOCUMENTATION ONLY (OUTPATIENT)
Dept: TRANSPLANT | Facility: CLINIC | Age: 28
End: 2023-04-27

## 2023-04-27 ENCOUNTER — TELEPHONE (OUTPATIENT)
Dept: TRANSPLANT | Facility: CLINIC | Age: 28
End: 2023-04-27

## 2023-04-27 DIAGNOSIS — Z94.4 LIVER REPLACED BY TRANSPLANT (H): Primary | ICD-10-CM

## 2023-04-27 DIAGNOSIS — F32.A DEPRESSION, UNSPECIFIED DEPRESSION TYPE: ICD-10-CM

## 2023-04-27 DIAGNOSIS — E66.9 DIABETES MELLITUS TYPE 2 IN OBESE: ICD-10-CM

## 2023-04-27 DIAGNOSIS — E11.69 DIABETES MELLITUS TYPE 2 IN OBESE: ICD-10-CM

## 2023-04-27 DIAGNOSIS — K21.9 GASTROESOPHAGEAL REFLUX DISEASE, UNSPECIFIED WHETHER ESOPHAGITIS PRESENT: ICD-10-CM

## 2023-04-27 DIAGNOSIS — I10 ESSENTIAL HYPERTENSION: ICD-10-CM

## 2023-04-27 DIAGNOSIS — G89.18 ACUTE POST-OPERATIVE PAIN: ICD-10-CM

## 2023-04-27 DIAGNOSIS — Z94.4 LIVER REPLACED BY TRANSPLANT (H): ICD-10-CM

## 2023-04-27 PROCEDURE — 99606 MTMS BY PHARM EST 15 MIN: CPT | Performed by: PHARMACIST

## 2023-04-27 PROCEDURE — 99607 MTMS BY PHARM ADDL 15 MIN: CPT | Performed by: PHARMACIST

## 2023-04-27 RX ORDER — MULTIPLE VITAMINS W/ MINERALS TAB 9MG-400MCG
1 TAB ORAL DAILY
COMMUNITY

## 2023-04-27 NOTE — TELEPHONE ENCOUNTER
ISSUE:   Tacrolimus IR level 7.4 on 4/26, goal 8-10, dose 2.5 mg BID.    PLAN:   Please call patient and confirm this was an accurate 12-hour trough. Verify Tacrolimus IR dose 2.5 mg BID. Confirm no new medications or illness. Confirm no missed doses. If accurate trough and accurate dose, increase Tacrolimus IR dose to 2.5 mg AM, 3 mg PM and repeat labs on Monday.    Evy Pride RN      OUTCOME:   Spoke with Leticia, they confirm accurate trough level and current dose 2.5 mg BID. Leticia confirmed dose change to 2.5 mg am, 3 mg pm  and to repeat labs. . Orders sent to [facility] pharmacy for dose change and lab for repeat labs. Leticia voiced understanding of plan.     Apolonia Peters LPN

## 2023-04-27 NOTE — PROGRESS NOTES
Medication Therapy Management (MTM) Encounter    ASSESSMENT:                            Medication Adherence/Access: No issues identified    Liver Transplant:  With improved kidney function, unclear if we need to be CNI sparing. Will discuss with transplant team for Tacrolimus goal of 8-10, perhaps eliminating need for prednisone.     Type 2 Diabetes:  Fasting sugars well controlled  on the lower end. We will reduce Lantus dose as patient is skipping 3 doses a week anyways.     Hypertension: BP at goal <140/90 at home.     GERD: No sx, can likely decrease Pantoprazole to once daily. Will discuss with txp team.     Depression:  Follow-up with PCP to discuss reducing fluoxetine if symptoms are well controlled to minimize medication use post liver transplant.      Pain: Recommended patient taper off Oxycodone, he is 2 months post txp.    PLAN:                            1. Follow-up with PCP to discuss Fluoxetine dose lowering.   2. Increase Acetaminophen to 650mg twice daily, taper of Oxycodone, reduce by 1 tablet every 2 days until off.  3. Change Lantus to 15 units just once daily in the morning.      Txp team consider...  1. Can we shoot for a Tacrolimus goal of 8-10 and perhaps eliminate need for extra prednisone? His kidney function is much improved.  2. Reducing pantoprazole 40mg to once daily. No GERD sx.     Follow-up: Next Thursday at 12:30 PM    SUBJECTIVE/OBJECTIVE:                          Dillon Salter is a 27 year old male called for a follow-up visit. Patient was accompanied by kenyatta. Today's visit is a follow-up MTM visit from 4/11     Reason for visit: general follow-up/ Diabetes/ and Dr. Rondon would like to reduce medications.  Last visit:  1. Get your medications 12 hours before your labs, try taking your medications at 12 and 12 so it is closer to your dose times. Recommend setting alarms at your dose times.    2. Increase Valcyte 450mg once every day.   3. Check blood sugars before  breakfast, lunch, dinner, bedtime. Write down the blood sugar with the time in your log book.   4. Taper off oxycodone ASAP.       Allergies/ADRs: Reviewed in chart  Past Medical History: Reviewed in chart  Tobacco: He reports that he has quit smoking. He has never used smokeless tobacco.  Alcohol: not currently using    Medication Adherence/Access: Currently taking medications at 1 PM and 1AM. No missed doses.     Liver Transplant:  Current immunosuppressants include Tacrolimus 2.5mg twice daily, Prednisone 5mg every morning, and Mycophenolate Mofetil 750mg twice daily.  Pt reports has tremors mild.   Transplant date: 23  Estimated Creatinine Clearance: 101.2 mL/min (based on SCr of 1.05 mg/dL).  CMV prophylaxis: Valcyte 450 mg daily Treat 3 months post tx.  PJP prophylaxis: Bactrim S S three times per week for 6 months post txp, decreased due to hyperkalemia  Supplements: Mag Oxide 800mg twice daily ( 2 hours from MMF) (IV last week of mag), Renalvite daily, Vitamin D3 25mcg daily   Other meds: Ursodiol 300mg twice daily  Vascular prophylaxis: Aspirin 81mg once daily. Denies gastrointestinal bleed sx.   Tx Coordinator: Kelsey Ponce MD: Dr. Wood, Dr. Sterling, Using Med Card: Yes  Immunizations: annual flu shot ; [id number]:  ; Prevnar 13/15/20: unknown; TDaP:  ; Shingrix: unknown, HBV: immunity per last titers, COVID: Giftly x3  Lab Results   Component Value Date    MAG 1.5 (L) 2023    MAG 1.5 (L) 2023    MAG 1.5 (L) 2023    POTASSIUM 5.3 (H) 2023    POTASSIUM 5.0 2023    POTASSIUM 5.0 2023    VITDT 17 (L) 2022     Type 2 Diabetes:  Currently taking Lantus 15 units every morning and 10 units every evening (skipping morning Lantus a few times weekly), Novolog sliding scale before meals. Patient is not experiencing side effects.  Blood sugar monitorin time(s) daily. Ranges (patient reported):   Symptoms of low blood sugar?  Clammy, weak, irritable, slow.   Symptoms of high blood sugar? none  Diet/Exercise: poor appetite. Sleep schedule is all off.         Lab Results   Component Value Date     A1C 5.2 03/12/2023     A1C 6.1 02/28/2023     A1C 5.6 01/28/2023     A1C 5.4 07/28/2022     A1C 7.4 04/07/2022     Date FBG/ 2hours post Lunch/2hours post Dinner /2hours post   4/26 98 143 /206   4/25 101 141 /178   4/24 42 151 /206   4/23 86 141 /178   4/22 82 145 /227   4/21 97 151 /196       Date FBG/ 2hours post Lunch/2hours post bedtime   4/10 247 (4 am) 119 (2 PM) 174 (3:30 AM)   4/9 167 4 am       4/8 244 4am       4/7   157 181     Hypertension: Current medications include Metoprolol 25mg twice daily, Furosemide 20mg daily x 5 days then stopping.  Patient does self-monitor blood pressure 138/80. Patient reports some swelling in ankles.  BP Readings from Last 3 Encounters:   04/24/23 (!) 134/96   04/21/23 (!) 123/94   04/17/23 132/89     GERD: Current medications include: Protonix (pantoprazole) 40mg twice daily. Patient reports no current symptoms.  Patient feels that current regimen is effective.    Depression:  Current medications include: Fluoxetine 60mg daily. Patient reports that depression symptoms are unchanged. Has been on for 4-5 years.       5/5/2022    10:10 AM 7/11/2022     1:00 PM 12/28/2022     1:00 PM   PHQ-9 SCORE   PHQ-9 Total Score 14 9 4      Pain: Pt is taking Acetaminophen 650mg every other day prn, Oxycodone 5mg 3 TIMES DAILY at most. Can spike to 7/10 with lots of activity. Pain mostly in his legs.     Today's Vitals: There were no vitals taken for this visit.  ----------------    I spent 25 minutes with this patient today. All changes were made via collaborative practice agreement with Dr. graff. A copy of the visit note was provided to the patient's provider(s).    A summary of these recommendations was sent via [employer].    Juan Castro, PharmD  MTM Pharmacist    Phone: [phone]     Telemedicine Visit  Details  Type of service:  Telephone visit  Start Time: 12:30 PM  End Time: 12:55 PM     Medication Therapy Recommendations  Acute post-operative pain    Current Medication: oxyCODONE (ROXICODONE) 5 MG tablet   Rationale: No medical indication at this time - Unnecessary medication therapy - Indication   Recommendation: Change Medication - acetaminophen 650 MG CR tablet   Status: Accepted - no CPA Needed         Depression, unspecified depression type    Current Medication: FLUoxetine (PROZAC) 20 MG capsule   Rationale: Dose too high - Dosage too high - Safety   Recommendation: Make Appt with PCP   Status: Accepted - no CPA Needed         Diabetes mellitus type 2 in obese (H)    Current Medication: insulin glargine (LANTUS PEN) 100 UNIT/ML pen   Rationale: Dose too high - Dosage too high - Safety   Recommendation: Decrease Dose   Status: Accepted per CPA         Gastroesophageal reflux disease, unspecified whether esophagitis present    Current Medication: pantoprazole (PROTONIX) 40 MG EC tablet   Rationale: Dose too high - Dosage too high - Safety   Recommendation: Decrease Frequency   Status: Contact Provider - Awaiting Response         Liver replaced by transplant (H)    Current Medication: tacrolimus (GENERIC EQUIVALENT) 1 MG capsule   Rationale: Dose too low - Dosage too low - Effectiveness   Recommendation: Increase Dose   Status: Contact Provider - Awaiting Response

## 2023-04-27 NOTE — PATIENT INSTRUCTIONS
"Recommendations from today's MTM visit:                                                       1. Follow-up with PCP to discuss Fluoxetine dose lowering.   2. Increase Acetaminophen to 650mg twice daily, taper of Oxycodone, reduce by 1 tablet every 2 days until off.  3. Change Lantus to 15 units just once daily in the morning.      Follow-up: Thursday at 12:30 PM    It was great speaking with you today.  I value your experience and would be very thankful for your time in providing feedback in our clinic survey. In the next few days, you may receive an email or text message from [employer] with a link to a survey related to your  clinical pharmacist.\"     To schedule another MTM appointment, please call the clinic directly or you may call the MTM scheduling line at [phone] or toll-free at [phone].     My Clinical Pharmacist's contact information:                                                      Please feel free to contact me with any questions or concerns you have.      Juan Castro, Albaro  MTM Pharmacist    Phone: [phone]     "

## 2023-04-28 ENCOUNTER — TELEPHONE (OUTPATIENT)
Dept: BEHAVIORAL HEALTH | Facility: CLINIC | Age: 28
End: 2023-04-28
Payer: COMMERCIAL

## 2023-04-28 NOTE — TELEPHONE ENCOUNTER
Writer placed a second call before 1pm to complete Check In. Pt's mom answered. Pt's mom informed writer that pt is not feeling well and pt is sleeping at the moment. Pt will most likely need to reschedule. Pt's mom informed writer that they have Intake's number. Will call to reschedule when pt is better. Writer updated pt's  that appt today is most likely a no-go.

## 2023-04-28 NOTE — TELEPHONE ENCOUNTER
Patient have a telephone appointment today at 1pm with [facility]. Writer placed a call this afternoon to complete Check In: [phone]. Unable to get a hold of patient. Writer left a vm with writer's call back number. Included in vm that 1:15pm is latest time to Check In. Included Intake's number to reschedule if needed.

## 2023-04-28 NOTE — PROGRESS NOTES
John Rondon MD Griffin, Peter Alberto, [facility]; Vicky Castro, RN  Okay to both   Thank you           Previous Messages       ----- Message -----   From: Juan Castro [facility]   Sent: 4/27/2023   1:59 PM CDT   To: John Rondon MD; Vicky Castro, RN     Couple of recommended med changes for patient to reduce his pill burden:     1. Can we shoot for a Tacrolimus goal of 8-10 and perhaps eliminate need for extra prednisone? His kidney function is much improved.   2. Reducing pantoprazole 40mg to once daily. No GERD sx.       A few other changes made today as well, see note for details.     Juan Castro, PharmD   [facility] Pharmacist     Phone: [phone]

## 2023-05-01 ENCOUNTER — TELEPHONE (OUTPATIENT)
Dept: TRANSPLANT | Facility: CLINIC | Age: 28
End: 2023-05-01

## 2023-05-01 ENCOUNTER — LAB REQUISITION (OUTPATIENT)
Dept: LAB | Facility: CLINIC | Age: 28
End: 2023-05-01
Payer: COMMERCIAL

## 2023-05-01 DIAGNOSIS — Z94.4 LIVER TRANSPLANT STATUS (H): ICD-10-CM

## 2023-05-01 LAB
ALBUMIN SERPL-MCNC: 3.9 G/DL (ref 3.5–5)
ALP SERPL-CCNC: 134 U/L (ref 45–120)
ALT SERPL W P-5'-P-CCNC: 21 U/L (ref 0–45)
ANION GAP SERPL CALCULATED.3IONS-SCNC: 15 MMOL/L (ref 5–18)
AST SERPL W P-5'-P-CCNC: 26 U/L (ref 0–40)
BASOPHILS # BLD AUTO: 0.1 10E3/UL (ref 0–0.2)
BASOPHILS NFR BLD AUTO: 1 %
BILIRUB DIRECT SERPL-MCNC: 0.4 MG/DL
BILIRUB SERPL-MCNC: 1 MG/DL (ref 0–1)
BUN SERPL-MCNC: 26 MG/DL (ref 8–22)
CALCIUM SERPL-MCNC: 9.9 MG/DL (ref 8.5–10.5)
CHLORIDE BLD-SCNC: 95 MMOL/L (ref 98–107)
CO2 SERPL-SCNC: 28 MMOL/L (ref 22–31)
CREAT SERPL-MCNC: 0.87 MG/DL (ref 0.7–1.3)
EOSINOPHIL # BLD AUTO: 0.2 10E3/UL (ref 0–0.7)
EOSINOPHIL NFR BLD AUTO: 2 %
ERYTHROCYTE [DISTWIDTH] IN BLOOD BY AUTOMATED COUNT: 14.4 % (ref 10–15)
GFR SERPL CREATININE-BSD FRML MDRD: >90 ML/MIN/1.73M2
GLUCOSE BLD-MCNC: 49 MG/DL (ref 70–125)
HCT VFR BLD AUTO: 35.2 % (ref 40–53)
HGB BLD-MCNC: 11.3 G/DL (ref 13.3–17.7)
HOLD SPECIMEN: NORMAL
IMM GRANULOCYTES # BLD: 0.1 10E3/UL
IMM GRANULOCYTES NFR BLD: 1 %
LYMPHOCYTES # BLD AUTO: 1.4 10E3/UL (ref 0.8–5.3)
LYMPHOCYTES NFR BLD AUTO: 17 %
MAGNESIUM SERPL-MCNC: 1.4 MG/DL (ref 1.8–2.6)
MCH RBC QN AUTO: 30.5 PG (ref 26.5–33)
MCHC RBC AUTO-ENTMCNC: 32.1 G/DL (ref 31.5–36.5)
MCV RBC AUTO: 95 FL (ref 78–100)
MONOCYTES # BLD AUTO: 0.2 10E3/UL (ref 0–1.3)
MONOCYTES NFR BLD AUTO: 3 %
NEUTROPHILS # BLD AUTO: 6.4 10E3/UL (ref 1.6–8.3)
NEUTROPHILS NFR BLD AUTO: 76 %
NRBC # BLD AUTO: 0 10E3/UL
NRBC BLD AUTO-RTO: 0 /100
PHOSPHATE SERPL-MCNC: 3.3 MG/DL (ref 2.5–4.5)
PLATELET # BLD AUTO: 221 10E3/UL (ref 150–450)
POTASSIUM BLD-SCNC: 4.4 MMOL/L (ref 3.5–5)
PROT SERPL-MCNC: 7.2 G/DL (ref 6–8)
RBC # BLD AUTO: 3.71 10E6/UL (ref 4.4–5.9)
SODIUM SERPL-SCNC: 138 MMOL/L (ref 136–145)
WBC # BLD AUTO: 8.3 10E3/UL (ref 4–11)

## 2023-05-01 PROCEDURE — 82248 BILIRUBIN DIRECT: CPT | Mod: ORL | Performed by: STUDENT IN AN ORGANIZED HEALTH CARE EDUCATION/TRAINING PROGRAM

## 2023-05-01 PROCEDURE — 85025 COMPLETE CBC W/AUTO DIFF WBC: CPT | Mod: ORL | Performed by: STUDENT IN AN ORGANIZED HEALTH CARE EDUCATION/TRAINING PROGRAM

## 2023-05-01 PROCEDURE — 80053 COMPREHEN METABOLIC PANEL: CPT | Mod: ORL | Performed by: STUDENT IN AN ORGANIZED HEALTH CARE EDUCATION/TRAINING PROGRAM

## 2023-05-01 PROCEDURE — 84100 ASSAY OF PHOSPHORUS: CPT | Mod: ORL | Performed by: STUDENT IN AN ORGANIZED HEALTH CARE EDUCATION/TRAINING PROGRAM

## 2023-05-01 PROCEDURE — 36415 COLL VENOUS BLD VENIPUNCTURE: CPT | Mod: ORL | Performed by: STUDENT IN AN ORGANIZED HEALTH CARE EDUCATION/TRAINING PROGRAM

## 2023-05-01 PROCEDURE — 83735 ASSAY OF MAGNESIUM: CPT | Mod: ORL | Performed by: STUDENT IN AN ORGANIZED HEALTH CARE EDUCATION/TRAINING PROGRAM

## 2023-05-01 RX ORDER — TACROLIMUS 1 MG/1
CAPSULE ORAL
Qty: 450 CAPSULE | Refills: 3 | Status: SHIPPED | OUTPATIENT
Start: 2023-05-01 | End: 2023-05-05

## 2023-05-01 RX ORDER — TACROLIMUS 0.5 MG/1
0.5 CAPSULE ORAL EVERY MORNING
Qty: 90 CAPSULE | Refills: 3 | Status: SHIPPED | OUTPATIENT
Start: 2023-05-01 | End: 2023-05-05

## 2023-05-01 NOTE — LETTER
OUTPATIENT LABORATORY TEST ORDER   [employer]  Fax#[phone]     Patient Name: Dillon Salter      YOB: 1995        [facility] MR# [if applicable]: [id number]   Date & Time: May 2, 2023  Expiration Date: 1 year after date issued         Diagnosis:      Liver Transplant (ICD-10 Z94.4)   Aftercare following organ transplant (ICD-10 Z48.288)   Long term use of medications (ICD-10 Z79.899)   Contact with and (suspected) exposure to other viral communicable                       diseases (Z20.828)      We ask your assistance in obtaining the following laboratory tests, which are part of our routine surveillance program for Solid Organ Transplant patients.      Please fax each result to [phone], same day as resulted/available    Critical lab results page [phone]  Monday - Friday 8 am to 5 pm  Evening/Weekend/Holiday communicate Critical labs results [phone]     First 8 weeks  Labs weekly (Monday or Tuesday) and as needed  CBC with Platelets   Basic Metabolic Panel   Phosphorous  Magnesium  Hepatic panel   Tacrolimus drug level - 12-hour trough, please document time of last dose                  Monthly starting this month  Phoshatidylethanol (PeTH)    .

## 2023-05-01 NOTE — TELEPHONE ENCOUNTER
Glucose 49 today, in the past low glucoses have been from not eating before labs drawn. Call to Leticia, left message and will call tomorrow to be sure about low glucose cause.     Spoke to Leticia, Dillon had not eaten before his labs were drawn yesterday, which is why his glucose was low. Also discussed lab frequency and ability to decrease to weekly labs starting next week.  New lab order faxed to [facility].

## 2023-05-04 ENCOUNTER — LAB REQUISITION (OUTPATIENT)
Dept: LAB | Facility: CLINIC | Age: 28
End: 2023-05-04
Payer: COMMERCIAL

## 2023-05-04 ENCOUNTER — DOCUMENTATION ONLY (OUTPATIENT)
Dept: TRANSPLANT | Facility: CLINIC | Age: 28
End: 2023-05-04

## 2023-05-04 DIAGNOSIS — Z94.4 LIVER TRANSPLANT STATUS (H): ICD-10-CM

## 2023-05-04 LAB
ALBUMIN SERPL-MCNC: 3.8 G/DL (ref 3.5–5)
ALP SERPL-CCNC: 150 U/L (ref 45–120)
ALT SERPL W P-5'-P-CCNC: 28 U/L (ref 0–45)
ANION GAP SERPL CALCULATED.3IONS-SCNC: 9 MMOL/L (ref 5–18)
AST SERPL W P-5'-P-CCNC: 31 U/L (ref 0–40)
BASOPHILS # BLD AUTO: 0 10E3/UL (ref 0–0.2)
BASOPHILS NFR BLD AUTO: 1 %
BILIRUB DIRECT SERPL-MCNC: 0.4 MG/DL
BILIRUB SERPL-MCNC: 0.9 MG/DL (ref 0–1)
BUN SERPL-MCNC: 24 MG/DL (ref 8–22)
CALCIUM SERPL-MCNC: 10.5 MG/DL (ref 8.5–10.5)
CHLORIDE BLD-SCNC: 96 MMOL/L (ref 98–107)
CO2 SERPL-SCNC: 33 MMOL/L (ref 22–31)
CREAT SERPL-MCNC: 0.86 MG/DL (ref 0.7–1.3)
EOSINOPHIL # BLD AUTO: 0.1 10E3/UL (ref 0–0.7)
EOSINOPHIL NFR BLD AUTO: 2 %
ERYTHROCYTE [DISTWIDTH] IN BLOOD BY AUTOMATED COUNT: 14.2 % (ref 10–15)
GFR SERPL CREATININE-BSD FRML MDRD: >90 ML/MIN/1.73M2
GLUCOSE BLD-MCNC: 71 MG/DL (ref 70–125)
HCT VFR BLD AUTO: 35.3 % (ref 40–53)
HGB BLD-MCNC: 11.5 G/DL (ref 13.3–17.7)
HOLD SPECIMEN: NORMAL
IMM GRANULOCYTES # BLD: 0.1 10E3/UL
IMM GRANULOCYTES NFR BLD: 1 %
LYMPHOCYTES # BLD AUTO: 1 10E3/UL (ref 0.8–5.3)
LYMPHOCYTES NFR BLD AUTO: 19 %
MAGNESIUM SERPL-MCNC: 1.5 MG/DL (ref 1.8–2.6)
MCH RBC QN AUTO: 30.4 PG (ref 26.5–33)
MCHC RBC AUTO-ENTMCNC: 32.6 G/DL (ref 31.5–36.5)
MCV RBC AUTO: 93 FL (ref 78–100)
MONOCYTES # BLD AUTO: 0.2 10E3/UL (ref 0–1.3)
MONOCYTES NFR BLD AUTO: 3 %
NEUTROPHILS # BLD AUTO: 4 10E3/UL (ref 1.6–8.3)
NEUTROPHILS NFR BLD AUTO: 74 %
NRBC # BLD AUTO: 0 10E3/UL
NRBC BLD AUTO-RTO: 0 /100
PHOSPHATE SERPL-MCNC: 3.4 MG/DL (ref 2.5–4.5)
PLATELET # BLD AUTO: 212 10E3/UL (ref 150–450)
POTASSIUM BLD-SCNC: 4.9 MMOL/L (ref 3.5–5)
PROT SERPL-MCNC: 6.9 G/DL (ref 6–8)
RBC # BLD AUTO: 3.78 10E6/UL (ref 4.4–5.9)
SODIUM SERPL-SCNC: 138 MMOL/L (ref 136–145)
WBC # BLD AUTO: 5.3 10E3/UL (ref 4–11)

## 2023-05-04 PROCEDURE — 85025 COMPLETE CBC W/AUTO DIFF WBC: CPT | Mod: ORL | Performed by: STUDENT IN AN ORGANIZED HEALTH CARE EDUCATION/TRAINING PROGRAM

## 2023-05-04 PROCEDURE — 80053 COMPREHEN METABOLIC PANEL: CPT | Mod: ORL | Performed by: STUDENT IN AN ORGANIZED HEALTH CARE EDUCATION/TRAINING PROGRAM

## 2023-05-04 PROCEDURE — 84100 ASSAY OF PHOSPHORUS: CPT | Mod: ORL | Performed by: STUDENT IN AN ORGANIZED HEALTH CARE EDUCATION/TRAINING PROGRAM

## 2023-05-04 PROCEDURE — 80197 ASSAY OF TACROLIMUS: CPT | Mod: ORL | Performed by: STUDENT IN AN ORGANIZED HEALTH CARE EDUCATION/TRAINING PROGRAM

## 2023-05-04 PROCEDURE — 83735 ASSAY OF MAGNESIUM: CPT | Mod: ORL | Performed by: STUDENT IN AN ORGANIZED HEALTH CARE EDUCATION/TRAINING PROGRAM

## 2023-05-04 PROCEDURE — 82248 BILIRUBIN DIRECT: CPT | Mod: ORL | Performed by: STUDENT IN AN ORGANIZED HEALTH CARE EDUCATION/TRAINING PROGRAM

## 2023-05-04 PROCEDURE — 36415 COLL VENOUS BLD VENIPUNCTURE: CPT | Mod: ORL | Performed by: STUDENT IN AN ORGANIZED HEALTH CARE EDUCATION/TRAINING PROGRAM

## 2023-05-04 NOTE — LETTER
OUTPATIENT LABORATORY TEST ORDER   [place]  [phone]    Patient Name: Dillon Salter   YOB: 1995     [facility] MR# [if applicable]: [id number]   Date & Time: May 4, 2023  3:57 PM  Expiration Date: 1 year after date issued       Diagnosis: Liver Transplant (ICD-10 Z94.4)   Aftercare following organ transplant (ICD-10 Z48.288)   Long term use of medications (ICD-10 Z79.899)   Contact with and (suspected) exposure to other viral communicable   diseases (Z20.828)      We ask your assistance in obtaining the following laboratory tests, which are part of our routine surveillance program for Solid Organ Transplant patients.     Please fax each result to [phone], same day as resulted/available    Critical lab results page [phone]  Monday - Friday 8 am to 5 pm  Evening/Weekend/Holiday communicate Critical labs results [phone]    Please draw a phosphatidyethanol (PeTH) with already scheduled labs on Monday 5/8.   This is ordered monthly, but has not yet been drawn.       If you have questions, please call [phone]     .

## 2023-05-05 ENCOUNTER — TELEPHONE (OUTPATIENT)
Dept: TRANSPLANT | Facility: CLINIC | Age: 28
End: 2023-05-05
Payer: COMMERCIAL

## 2023-05-05 DIAGNOSIS — Z94.4 LIVER REPLACED BY TRANSPLANT (H): ICD-10-CM

## 2023-05-05 LAB
TACROLIMUS BLD-MCNC: 10.4 UG/L (ref 5–15)
TME LAST DOSE: NORMAL H
TME LAST DOSE: NORMAL H

## 2023-05-05 RX ORDER — TACROLIMUS 1 MG/1
2 CAPSULE ORAL EVERY 12 HOURS
Qty: 360 CAPSULE | Refills: 3 | Status: SHIPPED | OUTPATIENT
Start: 2023-05-05 | End: 2023-05-10

## 2023-05-05 RX ORDER — TACROLIMUS 0.5 MG/1
0.5 CAPSULE ORAL EVERY 12 HOURS
Qty: 180 CAPSULE | Refills: 3 | Status: SHIPPED | OUTPATIENT
Start: 2023-05-05 | End: 2023-05-10

## 2023-05-05 NOTE — TELEPHONE ENCOUNTER
ISSUE:   Tacrolimus IR level 10.4 on 5/4, goal 8-10, dose 2.5 mg AM, 3 mg PM.    PLAN:   Please call patient and confirm this was an accurate 12-hour trough. Verify Tacrolimus IR dose 2.5 mg AM, 3 mg PM. Confirm no new medications or illness. Confirm no missed doses. If accurate trough and accurate dose, decrease Tacrolimus IR dose to 2.5 mg BID and repeat labs on Monday    Evy Pride RN      OUTCOME:   Spoke with Leticia, they confirm accurate trough level and current dose 2.5 mg am, 3 mg pm. Patient confirmed dose change to 2.5 mg BID and to repeat labs in 3 days. Orders sent to preferred pharmacy for dose change and lab for repeat labs. Leticia voiced understanding of plan.     Apolonia Peters LPN

## 2023-05-09 ENCOUNTER — TELEPHONE (OUTPATIENT)
Dept: TRANSPLANT | Facility: CLINIC | Age: 28
End: 2023-05-09
Payer: COMMERCIAL

## 2023-05-09 ENCOUNTER — LAB REQUISITION (OUTPATIENT)
Dept: LAB | Facility: CLINIC | Age: 28
End: 2023-05-09
Payer: COMMERCIAL

## 2023-05-09 DIAGNOSIS — Z94.4 LIVER TRANSPLANT STATUS (H): ICD-10-CM

## 2023-05-09 LAB
ALBUMIN SERPL-MCNC: 3.6 G/DL (ref 3.5–5)
ALP SERPL-CCNC: 330 U/L (ref 45–120)
ALT SERPL W P-5'-P-CCNC: 52 U/L (ref 0–45)
ANION GAP SERPL CALCULATED.3IONS-SCNC: 6 MMOL/L (ref 5–18)
AST SERPL W P-5'-P-CCNC: 49 U/L (ref 0–40)
BASOPHILS # BLD AUTO: 0.1 10E3/UL (ref 0–0.2)
BASOPHILS NFR BLD AUTO: 1 %
BILIRUB DIRECT SERPL-MCNC: 0.3 MG/DL
BILIRUB SERPL-MCNC: 0.8 MG/DL (ref 0–1)
BUN SERPL-MCNC: 19 MG/DL (ref 8–22)
CALCIUM SERPL-MCNC: 10 MG/DL (ref 8.5–10.5)
CHLORIDE BLD-SCNC: 98 MMOL/L (ref 98–107)
CO2 SERPL-SCNC: 33 MMOL/L (ref 22–31)
CREAT SERPL-MCNC: 0.73 MG/DL (ref 0.7–1.3)
EOSINOPHIL # BLD AUTO: 0.1 10E3/UL (ref 0–0.7)
EOSINOPHIL NFR BLD AUTO: 2 %
ERYTHROCYTE [DISTWIDTH] IN BLOOD BY AUTOMATED COUNT: 13.5 % (ref 10–15)
GFR SERPL CREATININE-BSD FRML MDRD: >90 ML/MIN/1.73M2
GLUCOSE BLD-MCNC: 68 MG/DL (ref 70–125)
HCT VFR BLD AUTO: 32.6 % (ref 40–53)
HGB BLD-MCNC: 10.6 G/DL (ref 13.3–17.7)
IMM GRANULOCYTES # BLD: 0.1 10E3/UL
IMM GRANULOCYTES NFR BLD: 2 %
LYMPHOCYTES # BLD AUTO: 0.7 10E3/UL (ref 0.8–5.3)
LYMPHOCYTES NFR BLD AUTO: 19 %
MAGNESIUM SERPL-MCNC: 1.4 MG/DL (ref 1.8–2.6)
MCH RBC QN AUTO: 30.5 PG (ref 26.5–33)
MCHC RBC AUTO-ENTMCNC: 32.5 G/DL (ref 31.5–36.5)
MCV RBC AUTO: 94 FL (ref 78–100)
MONOCYTES # BLD AUTO: 0.2 10E3/UL (ref 0–1.3)
MONOCYTES NFR BLD AUTO: 5 %
NEUTROPHILS # BLD AUTO: 2.7 10E3/UL (ref 1.6–8.3)
NEUTROPHILS NFR BLD AUTO: 71 %
NRBC # BLD AUTO: 0 10E3/UL
NRBC BLD AUTO-RTO: 1 /100
PLATELET # BLD AUTO: 180 10E3/UL (ref 150–450)
POTASSIUM BLD-SCNC: 5.2 MMOL/L (ref 3.5–5)
PROT SERPL-MCNC: 6.9 G/DL (ref 6–8)
RBC # BLD AUTO: 3.48 10E6/UL (ref 4.4–5.9)
SODIUM SERPL-SCNC: 137 MMOL/L (ref 136–145)
TACROLIMUS BLD-MCNC: 6.8 UG/L (ref 5–15)
TME LAST DOSE: NORMAL H
TME LAST DOSE: NORMAL H
WBC # BLD AUTO: 3.8 10E3/UL (ref 4–11)

## 2023-05-09 PROCEDURE — 83735 ASSAY OF MAGNESIUM: CPT | Mod: ORL | Performed by: STUDENT IN AN ORGANIZED HEALTH CARE EDUCATION/TRAINING PROGRAM

## 2023-05-09 PROCEDURE — 80053 COMPREHEN METABOLIC PANEL: CPT | Mod: ORL | Performed by: STUDENT IN AN ORGANIZED HEALTH CARE EDUCATION/TRAINING PROGRAM

## 2023-05-09 PROCEDURE — 82248 BILIRUBIN DIRECT: CPT | Mod: ORL | Performed by: STUDENT IN AN ORGANIZED HEALTH CARE EDUCATION/TRAINING PROGRAM

## 2023-05-09 PROCEDURE — 85025 COMPLETE CBC W/AUTO DIFF WBC: CPT | Mod: ORL | Performed by: STUDENT IN AN ORGANIZED HEALTH CARE EDUCATION/TRAINING PROGRAM

## 2023-05-09 PROCEDURE — 80197 ASSAY OF TACROLIMUS: CPT | Mod: ORL | Performed by: STUDENT IN AN ORGANIZED HEALTH CARE EDUCATION/TRAINING PROGRAM

## 2023-05-09 PROCEDURE — 80321 ALCOHOLS BIOMARKERS 1OR 2: CPT | Mod: ORL | Performed by: STUDENT IN AN ORGANIZED HEALTH CARE EDUCATION/TRAINING PROGRAM

## 2023-05-09 NOTE — TELEPHONE ENCOUNTER
Leticia called to report that Dillon missed his morning meds yesterday 5/8, but took his evening meds on time. He had labs drawn today, and took morning meds after. Will watch for lab results.

## 2023-05-10 ENCOUNTER — TELEPHONE (OUTPATIENT)
Dept: TRANSPLANT | Facility: CLINIC | Age: 28
End: 2023-05-10
Payer: COMMERCIAL

## 2023-05-10 DIAGNOSIS — Z94.4 LIVER REPLACED BY TRANSPLANT (H): Primary | ICD-10-CM

## 2023-05-10 DIAGNOSIS — R79.89 ELEVATED LFTS: ICD-10-CM

## 2023-05-10 RX ORDER — TACROLIMUS 0.5 MG/1
0.5 CAPSULE ORAL PRN
Qty: 180 CAPSULE | Refills: 3 | Status: SHIPPED | OUTPATIENT
Start: 2023-05-10 | End: 2023-05-13

## 2023-05-10 RX ORDER — TACROLIMUS 1 MG/1
3 CAPSULE ORAL EVERY 12 HOURS
Qty: 360 CAPSULE | Refills: 3 | Status: SHIPPED | OUTPATIENT
Start: 2023-05-10 | End: 2023-05-13

## 2023-05-10 NOTE — TELEPHONE ENCOUNTER
ISSUE:   Tacrolimus IR level 6.8 on 5/9, goal 8-10, dose 2.5 mg BID.    PLAN:   Please call patient and confirm this was an accurate 12-hour trough. Verify Tacrolimus IR dose 2.5 mg BID. Confirm no new medications or illness. Confirm no missed doses. If accurate trough and accurate dose, increase Tacrolimus IR dose to 3 mg BID and repeat labs tomorrow    OUTCOME:   Spoke with patient, they confirm accurate trough level and current dose 2.5 mg BID. Patient confirmed dose change to 3 mg BID and to repeat labs tomorrow. Orders sent to [facility] pharmacy for dose change and lab for repeat labs. Patient voiced understanding of plan.     Spoke to Leticia about elevated LFTs. Order placed for US and KUB per Vicky Peres's request. Leticia will schedule those when the schedulers call. Leticia will schedule another set of labs before clinic appointment tomorrow afternoon.

## 2023-05-11 ENCOUNTER — TELEPHONE (OUTPATIENT)
Dept: TRANSPLANT | Facility: CLINIC | Age: 28
End: 2023-05-11

## 2023-05-11 LAB
PLPETH BLD-MCNC: <10 NG/ML
POPETH BLD-MCNC: <10 NG/ML

## 2023-05-11 NOTE — TELEPHONE ENCOUNTER
Leticia mother of patient calling patient didn't get labs drawn today and she had to reschedule his appointment car is leaking coolant and she would like to know if she can go to a lab close to their home

## 2023-05-11 NOTE — TELEPHONE ENCOUNTER
Call to Leticia. She has already rescheduled today's appointment to Monday with Radha. Labs scheduled for tomorrow. Leticia said that Dillon missed his meds last night, she was worried that if he took them, his labs would be skewed today. Reminded her of the importance of not missing doses which she understands.

## 2023-05-12 ENCOUNTER — LAB (OUTPATIENT)
Dept: LAB | Facility: CLINIC | Age: 28
End: 2023-05-12
Payer: COMMERCIAL

## 2023-05-12 DIAGNOSIS — K70.31 ALCOHOLIC CIRRHOSIS OF LIVER WITH ASCITES (H): Chronic | ICD-10-CM

## 2023-05-12 DIAGNOSIS — Z94.4 LIVER REPLACED BY TRANSPLANT (H): ICD-10-CM

## 2023-05-12 LAB
ALBUMIN SERPL BCG-MCNC: 3.9 G/DL (ref 3.5–5.2)
ALP SERPL-CCNC: 427 U/L (ref 40–129)
ALT SERPL W P-5'-P-CCNC: 61 U/L (ref 10–50)
ANION GAP SERPL CALCULATED.3IONS-SCNC: 10 MMOL/L (ref 7–15)
AST SERPL W P-5'-P-CCNC: 42 U/L (ref 10–50)
BILIRUB DIRECT SERPL-MCNC: <0.2 MG/DL (ref 0–0.3)
BILIRUB SERPL-MCNC: 0.5 MG/DL
BUN SERPL-MCNC: 20.7 MG/DL (ref 6–20)
CALCIUM SERPL-MCNC: 9.8 MG/DL (ref 8.6–10)
CHLORIDE SERPL-SCNC: 96 MMOL/L (ref 98–107)
CREAT SERPL-MCNC: 0.87 MG/DL (ref 0.67–1.17)
DEPRECATED HCO3 PLAS-SCNC: 29 MMOL/L (ref 22–29)
ERYTHROCYTE [DISTWIDTH] IN BLOOD BY AUTOMATED COUNT: 13.4 % (ref 10–15)
GFR SERPL CREATININE-BSD FRML MDRD: >90 ML/MIN/1.73M2
GLUCOSE SERPL-MCNC: 244 MG/DL (ref 70–99)
HCT VFR BLD AUTO: 31 % (ref 40–53)
HGB BLD-MCNC: 10.2 G/DL (ref 13.3–17.7)
MAGNESIUM SERPL-MCNC: 1.4 MG/DL (ref 1.7–2.3)
MCH RBC QN AUTO: 31.1 PG (ref 26.5–33)
MCHC RBC AUTO-ENTMCNC: 32.9 G/DL (ref 31.5–36.5)
MCV RBC AUTO: 95 FL (ref 78–100)
PHOSPHATE SERPL-MCNC: 3.5 MG/DL (ref 2.5–4.5)
PLATELET # BLD AUTO: 181 10E3/UL (ref 150–450)
POTASSIUM SERPL-SCNC: 4.8 MMOL/L (ref 3.4–5.3)
PROT SERPL-MCNC: 6.6 G/DL (ref 6.4–8.3)
RBC # BLD AUTO: 3.28 10E6/UL (ref 4.4–5.9)
SODIUM SERPL-SCNC: 135 MMOL/L (ref 136–145)
WBC # BLD AUTO: 4.6 10E3/UL (ref 4–11)

## 2023-05-12 PROCEDURE — 80053 COMPREHEN METABOLIC PANEL: CPT

## 2023-05-12 PROCEDURE — 80321 ALCOHOLS BIOMARKERS 1OR 2: CPT | Mod: 90

## 2023-05-12 PROCEDURE — 36415 COLL VENOUS BLD VENIPUNCTURE: CPT

## 2023-05-12 PROCEDURE — 83735 ASSAY OF MAGNESIUM: CPT

## 2023-05-12 PROCEDURE — 80197 ASSAY OF TACROLIMUS: CPT

## 2023-05-12 PROCEDURE — 84100 ASSAY OF PHOSPHORUS: CPT

## 2023-05-12 PROCEDURE — 82248 BILIRUBIN DIRECT: CPT

## 2023-05-12 PROCEDURE — 85027 COMPLETE CBC AUTOMATED: CPT

## 2023-05-12 PROCEDURE — 99000 SPECIMEN HANDLING OFFICE-LAB: CPT

## 2023-05-13 ENCOUNTER — TELEPHONE (OUTPATIENT)
Dept: TRANSPLANT | Facility: CLINIC | Age: 28
End: 2023-05-13
Payer: COMMERCIAL

## 2023-05-13 DIAGNOSIS — Z94.4 LIVER REPLACED BY TRANSPLANT (H): ICD-10-CM

## 2023-05-13 LAB
TACROLIMUS BLD-MCNC: 7.3 UG/L (ref 5–15)
TME LAST DOSE: NORMAL H
TME LAST DOSE: NORMAL H

## 2023-05-13 RX ORDER — TACROLIMUS 0.5 MG/1
0.5 CAPSULE ORAL EVERY MORNING
Qty: 180 CAPSULE | Refills: 1 | Status: SHIPPED | OUTPATIENT
Start: 2023-05-13 | End: 2023-06-02

## 2023-05-13 RX ORDER — TACROLIMUS 1 MG/1
3 CAPSULE ORAL EVERY 12 HOURS
Qty: 360 CAPSULE | Refills: 3 | Status: SHIPPED | OUTPATIENT
Start: 2023-05-13 | End: 2023-06-02

## 2023-05-14 LAB
PLPETH BLD-MCNC: <10 NG/ML
POPETH BLD-MCNC: <10 NG/ML

## 2023-05-14 NOTE — TELEPHONE ENCOUNTER
TRIAGE CALL:    Kenyatta, patient's mother paged this evening to let RNCC on call know that morning dose of IS was accidentally missed this morning 5/13/2023 due to their family traveling.      Instructed Kenyatta to give merced his evening dose with no extra doses.    Tac level: 7.3 on 5/12/2023, which kenyatta confirms is an accurate 12 hour trough level.  Goal 8-10. Current dose of tacrolimus is 3 mg bid, instructed Kenyatta to increase tacrolimus dose to 3.5 mg in the AM and 3 mg in the PM.      Primary RNCC will reach out to discuss lab repeat but likely this before the end of next week.  Kenyatta V/U    RX updated.    Ina Morrison RN   Transplant Coordinator  [phone]

## 2023-05-15 ENCOUNTER — TELEPHONE (OUTPATIENT)
Dept: TRANSPLANT | Facility: CLINIC | Age: 28
End: 2023-05-15
Payer: COMMERCIAL

## 2023-05-15 ENCOUNTER — VIRTUAL VISIT (OUTPATIENT)
Dept: TRANSPLANT | Facility: CLINIC | Age: 28
End: 2023-05-15
Attending: SURGERY
Payer: COMMERCIAL

## 2023-05-15 DIAGNOSIS — R79.89 ELEVATED LFTS: ICD-10-CM

## 2023-05-15 DIAGNOSIS — Z94.4 LIVER REPLACED BY TRANSPLANT (H): Primary | ICD-10-CM

## 2023-05-15 DIAGNOSIS — Z94.4 LIVER REPLACED BY TRANSPLANT (H): ICD-10-CM

## 2023-05-15 PROCEDURE — 99213 OFFICE O/P EST LOW 20 MIN: CPT | Mod: VID | Performed by: NURSE PRACTITIONER

## 2023-05-15 NOTE — PROGRESS NOTES
Transplant Surgery -OUTPATIENT IMMUNOSUPPRESSION PROGRESS NOTE   Video visit      Date of Visit: 05/15/2023    Transplants:  2/28/2023 (Liver); Postoperative day:  76  ASSESMENT AND PLAN:  1.Graft Function: Alk phos trending up. Bx ordered and to be scheduled. US and xray on Wednesday.   2.Immunosuppression Management: no change  3.Hypertension:stable   4.Renal Function: stable  5.Lab frequency:  As scheduled   6.Other:    Date: May 15, 2023    Transplant:  [x]                             Liver [x]                              Kidney []                             Pancreas []                              Other:             Chief Complaint:Post-op Visit (Liver)    History of Present Illness:   C/O mild cold s/s which are mostly body aches.    No fever, CP or cough.   Seeing primary tomorrow.   Alk phos is elevated - Liver biopsy appointment pending. US and Xray on Wednesday.     Eating and drinking ok. Appetite is good. Maintaining weight.   Some swelling in ankles/feet which is not new and better overall.   No diarrhea or constipation.     No incisional or abdominal pain.      Glucose less than 200 this week. Seeing pharmacist tomorrow.   BP average 130/70 at home.         Patient Active Problem List   Diagnosis     Alcoholic cirrhosis of liver with ascites (H)     Diabetes mellitus type 2 in obese (H)     Autism spectrum disorder     Benign essential hypertension     Anxiety and depression     Balanitis     Acute posthemorrhagic anemia     Hepatic encephalopathy     Anemia in other chronic diseases classified elsewhere     Hyperglycemia     Thrombocytopenia (H)     Hematemesis     Acute kidney failure, unspecified (H)     Liver replaced by transplant (H)     Immunosuppressed status (H)     Diarrhea     Severe malnutrition (H)     Delirium     Acute post-operative pain     Other chronic pain     Cavitary lesion of lung     Hypomagnesemia     SOCIAL /FAMILY HISTORY: [x]                  No recent change    Past  Medical History:   Diagnosis Date     Acute on chronic anemia 04/08/2022     Alcohol use disorder      Alcohol use disorder, severe, dependence (H) 07/11/2022     Alcoholic cirrhosis of liver with ascites (H)      Alcoholic hepatitis      Autism spectrum disorder 04/08/2022    High functioning autistic.  26-year-old history of autism/Asperger's on the spectrum graduated high school at [employer] worked at [employer] and then another  place until the Covid epidemic in 2020 lives with parents (Leticia and Wes) socially isolated drinks alcohol beer daily      Benign essential hypertension      Bleeding esophageal varices (H) 06/18/2022     Hepatic encephalopathy (H)      Hyponatremia 07/28/2022     Major depressive disorder      Type II Diabetes      Past Surgical History:   Procedure Laterality Date     BENCH LIVER  2/28/2023    Procedure: Bench liver;  Surgeon: Thelma Sterling MD;  Location: [facility] OR     COLONOSCOPY N/A 1/27/2023    Procedure: Colonoscopy;  Surgeon: Osman Montoya MD;  Location: [facility]     ENDOSCOPIC ULTRASOUND LOWER GASTROINTESTIONAL TRACT (GI) N/A 1/27/2023    Procedure: ULTRASOUND, LOWER GI TRACT, ENDOSCOPIC with glueing;  Surgeon: Osman Montoya MD;  Location: [facility] OR     ESOPHAGOSCOPY, GASTROSCOPY, DUODENOSCOPY (EGD), COMBINED N/A 5/24/2022    Procedure: ESOPHAGOGASTRODUODENOSCOPY (EGD) with esophageal banding;  Surgeon: Gary Hurst MD;  Location: [facility] OR     ESOPHAGOSCOPY, GASTROSCOPY, DUODENOSCOPY (EGD), COMBINED N/A 6/20/2022    Procedure: ESOPHAGOGASTRODUODENOSCOPY;  Surgeon: Gavino Reza MD;  Location: [facility] OR     ESOPHAGOSCOPY, GASTROSCOPY, DUODENOSCOPY (EGD), COMBINED N/A 1/23/2023    Procedure: ESOPHAGOGASTRODUODENOSCOPY (EGD) with esophageal variceal banding;  Surgeon: Juan Boo MD;  Location: [facility] OR     ESOPHAGOSCOPY, GASTROSCOPY, DUODENOSCOPY (EGD), COMBINED N/A 1/25/2023    Procedure: ESOPHAGOGASTRODUODENOSCOPY (EGD);   Surgeon: Juan Boo MD;  Location: [facility] OR     ESOPHAGOSCOPY, GASTROSCOPY, DUODENOSCOPY (EGD), COMBINED N/A 2023    Procedure: Esophagoscopy, gastroscopy, duodenoscopy (EGD), combined;  Surgeon: Osman Montoya MD;  Location: [facility] OR     ESOPHAGOSCOPY, GASTROSCOPY, DUODENOSCOPY (EGD), COMBINED N/A 2023    Procedure: ESOPHAGOGASTRODUODENOSCOPY (EGD);  Surgeon: Leventhal, Thomas Michael, MD;  Location: [facility] OR     IR CVC NON TUNNEL LINE REMOVAL  3/10/2023     IR CVC TUNNEL PLACEMENT > 5 YRS OF AGE  3/10/2023     IR CVC TUNNEL REMOVAL RIGHT  2023     MIDLINE DOUBLE LUMEN PLACEMENT  2022          PICC TRIPLE LUMEN PLACEMENT  2022          RETURN LIVER TRANSPLANT N/A 3/1/2023    Procedure: RETURN TO OPERATING ROOM, AFTER LIVER TRANSPLANT;  Surgeon: Thelma Sterling MD;  Location: [facility] OR     TRANSPLANT LIVER RECIPIENT  DONOR N/A 2023    Procedure: Transplant liver recipient  donor;  Surgeon: Thelma Sterling MD;  Location: [facility] OR     Social History     Socioeconomic History     Marital status: Single     Spouse name: Not on file     Number of children: Not on file     Years of education: Not on file     Highest education level: Not on file   Occupational History     Not on file   Tobacco Use     Smoking status: Former     Smokeless tobacco: Never     Tobacco comments:     A pack lasted a year, hardly smoked   Vaping Use     Vaping status: Former   Substance and Sexual Activity     Alcohol use: Not Currently     Comment: No ETOH since 20     Drug use: Not Currently     Types: Marijuana     Sexual activity: Not on file   Other Topics Concern     Not on file   Social History Narrative    26-year-old history of autism/Asperger's on the spectrum graduated high school at [employer] worked at [employer] and then another  place until the Covid epidemic in 2020 lives with parents (Leticia and Wes) socially isolated drinks alcohol beer daily        End-stage cirrhosis  noted on admission to  2022     Social Determinants of Health     Financial Resource Strain: Not on file   Food Insecurity: Not on file   Transportation Needs: Not on file   Physical Activity: Not on file   Stress: Not on file   Social Connections: Not on file   Intimate Partner Violence: Not on file   Housing Stability: Not on file     Prescription Medications as of 5/15/2023       Rx Number Disp Refills Start End Last Dispensed Date Next Fill Date Owning Pharmacy    acetaminophen (TYLENOL) 325 MG tablet  100 tablet 0 3/19/2023    [facility] - [place], [place] - [address]    Si tablets (650 mg) by Oral or Feeding Tube route every 8 hours as needed for mild pain or fever    Class: E-Prescribe    Route: Oral or Feeding Tube    Renewals     Renewal requests to authorizing provider (Julieth Leblanc NP) <b>prohibited</b>          aspirin (ASA) 81 MG EC tablet  30 tablet 0 2023    [facility] - [place], [place] - [address]    Sig: Take 1 tablet (81 mg) by mouth daily    Class: E-Prescribe    Notes to Pharmacy: Will purchase OTC.    Route: Oral    FLUoxetine (PROZAC) 20 MG capsule  90 capsule 0 2023        Sig: Take 3 capsules (60 mg) by mouth At Bedtime    Class: Historical    Route: Oral    insulin aspart (NOVOLOG FLEXPEN) 100 UNIT/ML pen  15 mL 3 2022    [id number] [address]  AT [facility]    Si unit per 10 grams of carb, plus additional units based on following scale   For Pre-Meal  - 164 give 1 unit. For Pre-Meal  - 189 give 2 units. For Pre-Meal  - 214 give 3 units. For Pre-Meal  - 239 give 4 units. For Pre-Meal  - 264 give 5 units. For Pre-Meal  - 289 give 6 units. For Pre-Meal  - 314 give 7 units. For Pre-Meal  - 339 give 8 units. For Pre-Meal  - 364 give 9 units.  For Pre-Meal BG greater than or equal to 365 give  10 units    Class: No Print Out    insulin glargine (LANTUS PEN) 100 UNIT/ML pen  15 mL 0 3/19/2023    [address]    Sig: Inject 15 Units Subcutaneous every morning AND 10 Units At Bedtime.    Class: E-Prescribe    Notes to Pharmacy: If Lantus is not covered by insurance, may substitute Basaglar or Semglee or other insulin glargine product per insurance preference at same dose and frequency.      Route: Subcutaneous    Renewals     Renewal requests to authorizing provider (Julieth Leblanc NP) <b>prohibited</b>          magnesium oxide (MAG-OX) 400 MG tablet  120 tablet 3 4/18/2023    [facility] - [place] [address] [address]     Sig: Take 2 tablets (800 mg) by mouth 2 times daily    Class: E-Prescribe    Route: Oral    metoprolol tartrate (LOPRESSOR) 25 MG tablet  60 tablet 1 3/17/2023    [address]    Sig: Take 1 tablet (25 mg) by mouth 2 times daily    Class: E-Prescribe    Route: Oral    Renewals     Renewal requests to authorizing provider (Julieth Leblanc NP) <b>prohibited</b>          multivitamin w/minerals (THERA-VIT-M) tablet            Sig: Take 1 tablet by mouth daily    Class: Historical    Route: Oral    mycophenolate (GENERIC EQUIVALENT) 250 MG capsule  540 capsule 3 3/28/2023    [facility] [place] [address] [address]     Sig: Take 3 capsules (750 mg) by mouth 2 times daily    Class: E-Prescribe    Notes to Pharmacy: TXP DT 2/28/2023 (Liver) TXP Dischg DT 3/19/2023 DX Liver replaced by transplant Z94.4 [facility] ([place], [place])    Route: Oral    oxyCODONE (ROXICODONE) 5 MG tablet            Sig: Take 5 mg by mouth every 6 hours as needed for severe pain From PCP    Class: Historical    Route: Oral    pantoprazole (PROTONIX) 40 MG EC tablet  180 tablet 3 3/28/2023     [facility] [place] [address]    Sig: Take 1 tablet (40 mg) by mouth 2 times daily    Class: E-Prescribe    Route: Oral    predniSONE (DELTASONE) 5 MG tablet  90 tablet 1 3/28/2023    [facility] [place] [address]    Sig: Take 1 tablet (5 mg) by mouth daily    Class: E-Prescribe    Notes to Pharmacy: TXP DT 2/28/2023 (Liver) TXP Dischg DT 3/19/2023 DX Liver replaced by transplant Z94.4 [facility] ([place], [place])    Route: Oral    sulfamethoxazole-trimethoprim (BACTRIM) 400-80 MG tablet  36 tablet 3 4/26/2023    [facility] [place] [address]    Sig: Take 1 tablet by mouth three times a week Increase as instructed per transplant team    Class: E-Prescribe    Route: Oral    tacrolimus (GENERIC EQUIVALENT) 0.5 MG capsule  180 capsule 1 5/13/2023    [facility] [place] [address] [address]    Sig: Take 1 capsule (0.5 mg) by mouth every morning Total dose: 3.5 mg in the AM and 3 mg in the PM    Class: E-Prescribe    Notes to Pharmacy: TXP DT 2/28/2023 (Liver) TXP Dischg DT 3/19/2023 DX Liver replaced by transplant Z94.4 [facility] ([place], [place])    Route: Oral    tacrolimus (GENERIC EQUIVALENT) 1 MG capsule  360 capsule 3 5/13/2023    [facility] [place] [address] [address]    Sig: Take 3 capsules (3 mg) by mouth every 12 hours Total dose: 3.5 mg in the AM, 3 mg in the PM    Class: E-Prescribe    Notes to Pharmacy: TXP DT 2/28/2023 (Liver) TXP Dischg DT 3/19/2023 DX Liver replaced by transplant Z94.4 [facility] ([place], [place])    Route: Oral    ursodiol (ACTIGALL) 300 MG capsule  180 capsule 3 3/28/2023    [facility] [place] [address]    Sig: Take 1  capsule (300 mg) by mouth 2 times daily    Class: E-Prescribe    Route: Oral    valGANciclovir (VALCYTE) 450 MG tablet  30 tablet 2 3/30/2023    [facility] [place] [address] [address]    Sig: Take 1 tablet (450 mg) by mouth twice a week Increase to 450mg daily when directed by transplant team with improving renal function    Class: E-Prescribe    Route: Oral    Vitamin D3 (CHOLECALCIFEROL) 25 mcg (1000 units) tablet  90 tablet 3 3/28/2023    [facility] [place] [address]    Si tablet (25 mcg) by Oral or Feeding Tube route daily    Class: E-Prescribe    Route: Oral or Feeding Tube        Patient has no known allergies.   REVIEW OF SYSTEMS (check box if normal)  [x]               GENERAL  [x]                 PULMONARY [x]                GENITOURINARY  [x]                CNS                 [x]                 CARDIAC  [x]                 ENDOCRINE  [x]                EARS,NOSE,THROAT [x]                 GASTROINTESTINAL [x]                 NEUROLOGIC    [x]                MUSCLOSKELTAL  [x]                  HEMATOLOGY      PHYSICAL EXAM (check box if normal)There were no vitals taken for this visit.        [x]            GENERAL:    [x]       EYES:  ICTERIC   []        YES  []                    NO  [x]           EXTREMITIES: Clubbing []       Y     [x]           N    []           EARS, NOSE, THROAT: Membranes Moist    YES   [x]                   NO []                  []           LUNGS:  CLEAR    YES       []                  NO    []                                [x]           SKIN: Jaundice           YES       []                  NO    []                   Rash: YES       []                  NO    []                                     []             HEART: Regular Rate          YES       []                  NO    []                   Incision Clean:  YES       []                  NO    []                                []                     ABDOMEN: Organomegaly YES       []                  NO    []                       [x]                    NEUROLOGICAL:  Nonfocal  YES       [x]                  NO    []                       []                    Hernia YES       []                  NO    [x]                   PSYCHIATRIC:  Appropriate  YES       [x]                  NO    []                       OTHER:                                                                                                   PAIN SCALE:: 1

## 2023-05-15 NOTE — LETTER
5/15/2023         RE: Dillon Salter  [address] [facility] [place] [address]        Dear Colleague,    Thank you for referring your patient, Dillon Salter, to the [facility] TRANSPLANT [facility]. Please see a copy of my visit note below.    Transplant Surgery -OUTPATIENT IMMUNOSUPPRESSION PROGRESS NOTE   Video visit      Date of Visit: 05/15/2023    Transplants:  2/28/2023 (Liver); Postoperative day:  76  ASSESMENT AND PLAN:  1.Graft Function: Alk phos trending up. Bx ordered and to be scheduled. US and xray on Wednesday.   2.Immunosuppression Management: no change  3.Hypertension:stable   4.Renal Function: stable  5.Lab frequency:  As scheduled   6.Other:    Date: May 15, 2023    Transplant:  [x]                             Liver [x]                              Kidney []                             Pancreas []                              Other:             Chief Complaint:Post-op Visit (Liver)    History of Present Illness:   C/O mild cold s/s which are mostly body aches.    No fever, CP or cough.   Seeing primary tomorrow.   Alk phos is elevated - Liver biopsy appointment pending. US and Xray on Wednesday.     Eating and drinking ok. Appetite is good. Maintaining weight.   Some swelling in ankles/feet which is not new and better overall.   No diarrhea or constipation.     No incisional or abdominal pain.      Glucose less than 200 this week. Seeing pharmacist tomorrow.   BP average 130/70 at home.         Patient Active Problem List   Diagnosis    Alcoholic cirrhosis of liver with ascites (H)    Diabetes mellitus type 2 in obese (H)    Autism spectrum disorder    Benign essential hypertension    Anxiety and depression    Balanitis    Acute posthemorrhagic anemia    Hepatic encephalopathy    Anemia in other chronic diseases classified elsewhere    Hyperglycemia    Thrombocytopenia (H)    Hematemesis    Acute kidney failure, unspecified (H)    Liver replaced by transplant (H)    Immunosuppressed  status (H)    Diarrhea    Severe malnutrition (H)    Delirium    Acute post-operative pain    Other chronic pain    Cavitary lesion of lung    Hypomagnesemia     SOCIAL /FAMILY HISTORY: [x]                  No recent change    Past Medical History:   Diagnosis Date    Acute on chronic anemia 04/08/2022    Alcohol use disorder     Alcohol use disorder, severe, dependence (H) 07/11/2022    Alcoholic cirrhosis of liver with ascites (H)     Alcoholic hepatitis     Autism spectrum disorder 04/08/2022    High functioning autistic.  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 lives with parents (Leticia and Wes) socially isolated drinks alcohol beer daily     Benign essential hypertension     Bleeding esophageal varices (H) 06/18/2022    Hepatic encephalopathy (H)     Hyponatremia 07/28/2022    Major depressive disorder     Type II Diabetes      Past Surgical History:   Procedure Laterality Date    BENCH LIVER  2/28/2023    Procedure: Bench liver;  Surgeon: Thelma Sterling MD;  Location: [facility] OR    COLONOSCOPY N/A 1/27/2023    Procedure: Colonoscopy;  Surgeon: Osman Montoya MD;  Location: [facility]    ENDOSCOPIC ULTRASOUND LOWER GASTROINTESTIONAL TRACT (GI) N/A 1/27/2023    Procedure: ULTRASOUND, LOWER GI TRACT, ENDOSCOPIC with glueing;  Surgeon: Osamn Montoya MD;  Location: [facility] OR    ESOPHAGOSCOPY, GASTROSCOPY, DUODENOSCOPY (EGD), COMBINED N/A 5/24/2022    Procedure: ESOPHAGOGASTRODUODENOSCOPY (EGD) with esophageal banding;  Surgeon: Gary Hurst MD;  Location: [facility] Main OR    ESOPHAGOSCOPY, GASTROSCOPY, DUODENOSCOPY (EGD), COMBINED N/A 6/20/2022    Procedure: ESOPHAGOGASTRODUODENOSCOPY;  Surgeon: Gavino Reza MD;  Location: [facility] Main OR    ESOPHAGOSCOPY, GASTROSCOPY, DUODENOSCOPY (EGD), COMBINED N/A 1/23/2023    Procedure: ESOPHAGOGASTRODUODENOSCOPY (EGD) with  esophageal variceal banding;  Surgeon: Juan Boo MD;  Location: [facility] Main OR    ESOPHAGOSCOPY, GASTROSCOPY, DUODENOSCOPY (EGD), COMBINED N/A 2023    Procedure: ESOPHAGOGASTRODUODENOSCOPY (EGD);  Surgeon: Juan Boo MD;  Location: [facility] Main OR    ESOPHAGOSCOPY, GASTROSCOPY, DUODENOSCOPY (EGD), COMBINED N/A 2023    Procedure: Esophagoscopy, gastroscopy, duodenoscopy (EGD), combined;  Surgeon: Osman Montoya MD;  Location: [facility] OR    ESOPHAGOSCOPY, GASTROSCOPY, DUODENOSCOPY (EGD), COMBINED N/A 2023    Procedure: ESOPHAGOGASTRODUODENOSCOPY (EGD);  Surgeon: Leventhal, Thomas Michael, MD;  Location: [facility] OR    IR CVC NON TUNNEL LINE REMOVAL  3/10/2023    IR CVC TUNNEL PLACEMENT > 5 YRS OF AGE  3/10/2023    IR CVC TUNNEL REMOVAL RIGHT  2023    MIDLINE DOUBLE LUMEN PLACEMENT  2022         PICC TRIPLE LUMEN PLACEMENT  2022         RETURN LIVER TRANSPLANT N/A 3/1/2023    Procedure: RETURN TO OPERATING ROOM, AFTER LIVER TRANSPLANT;  Surgeon: Thelma Sterling MD;  Location: [facility] OR    TRANSPLANT LIVER RECIPIENT  DONOR N/A 2023    Procedure: Transplant liver recipient  donor;  Surgeon: Thelma Sterling MD;  Location: [facility] OR     Social History     Socioeconomic History    Marital status: Single     Spouse name: Not on file    Number of children: Not on file    Years of education: Not on file    Highest education level: Not on file   Occupational History    Not on file   Tobacco Use    Smoking status: Former    Smokeless tobacco: Never    Tobacco comments:     A pack lasted a year, hardly smoked   Vaping Use    Vaping status: Former   Substance and Sexual Activity    Alcohol use: Not Currently     Comment: No ETOH since 20    Drug use: Not Currently     Types: Marijuana    Sexual activity: Not on file   Other Topics Concern    Not on file   Social History Narrative    26-year-old history of autism/Asperger's on the spectrum graduated high school at  [employer] worked at  and then another  place until the Covid epidemic in 2020 lives with parents (Leticia and Wes) socially isolated drinks alcohol beer daily        End-stage cirrhosis noted on admission to  2022     Social Determinants of Health     Financial Resource Strain: Not on file   Food Insecurity: Not on file   Transportation Needs: Not on file   Physical Activity: Not on file   Stress: Not on file   Social Connections: Not on file   Intimate Partner Violence: Not on file   Housing Stability: Not on file     Prescription Medications as of 5/15/2023         Rx Number Disp Refills Start End Last Dispensed Date Next Fill Date Owning Pharmacy    acetaminophen (TYLENOL) 325 MG tablet  100 tablet 0 3/19/2023    [facility] - [place], [place] - [address]    Si tablets (650 mg) by Oral or Feeding Tube route every 8 hours as needed for mild pain or fever    Class: E-Prescribe    Route: Oral or Feeding Tube    Renewals       Renewal requests to authorizing provider (Julieth Leblanc, NP) <b>prohibited</b>            aspirin (ASA) 81 MG EC tablet  30 tablet 0 2023    [facility] - [place], [place] - [address] [place] [address]    Sig: Take 1 tablet (81 mg) by mouth daily    Class: E-Prescribe    Notes to Pharmacy: Will purchase OTC.    Route: Oral    FLUoxetine (PROZAC) 20 MG capsule  90 capsule 0 2023        Sig: Take 3 capsules (60 mg) by mouth At Bedtime    Class: Historical    Route: Oral    insulin aspart (NOVOLOG FLEXPEN) 100 UNIT/ML pen  15 mL 3 2022    [id number] [place] [address] [address] AT [facility]    Si unit per 10 grams of carb, plus additional units based on following scale   For Pre-Meal  - 164 give 1 unit. For Pre-Meal  - 189 give 2 units. For Pre-Meal  - 214 give 3 units. For Pre-Meal  - 239 give 4 units. For Pre-Meal  - 264 give 5 units. For  Pre-Meal  - 289 give 6 units. For Pre-Meal  - 314 give 7 units. For Pre-Meal  - 339 give 8 units. For Pre-Meal  - 364 give 9 units.  For Pre-Meal BG greater than or equal to 365 give 10 units    Class: No Print Out    insulin glargine (LANTUS PEN) 100 UNIT/ML pen  15 mL 0 3/19/2023    [facility] [address]    Sig: Inject 15 Units Subcutaneous every morning AND 10 Units At Bedtime.    Class: E-Prescribe    Notes to Pharmacy: If Lantus is not covered by insurance, may substitute Basaglar or Semglee or other insulin glargine product per insurance preference at same dose and frequency.      Route: Subcutaneous    Renewals       Renewal requests to authorizing provider (Julieth Leblanc NP) <b>prohibited</b>            magnesium oxide (MAG-OX) 400 MG tablet  120 tablet 3 4/18/2023    [facility] - [place] [address]     Sig: Take 2 tablets (800 mg) by mouth 2 times daily    Class: E-Prescribe    Route: Oral    metoprolol tartrate (LOPRESSOR) 25 MG tablet  60 tablet 1 3/17/2023    [facility] [address]    Sig: Take 1 tablet (25 mg) by mouth 2 times daily    Class: E-Prescribe    Route: Oral    Renewals       Renewal requests to authorizing provider (Julieth Leblanc, NP) <b>prohibited</b>            multivitamin w/minerals (THERA-VIT-M) tablet            Sig: Take 1 tablet by mouth daily    Class: Historical    Route: Oral    mycophenolate (GENERIC EQUIVALENT) 250 MG capsule  540 capsule 3 3/28/2023    [facility] - [place] [address]     Sig: Take 3 capsules (750 mg) by mouth 2 times daily    Class: E-Prescribe    Notes to Pharmacy: TXP DT 2/28/2023 (Liver) TXP Dischg DT 3/19/2023 DX Liver replaced by transplant Z94.4 [facility] ([place], [place])    Route: Oral    oxyCODONE  (ROXICODONE) 5 MG tablet            Sig: Take 5 mg by mouth every 6 hours as needed for severe pain From PCP    Class: Historical    Route: Oral    pantoprazole (PROTONIX) 40 MG EC tablet  180 tablet 3 3/28/2023    [facility] [place] [address]    Sig: Take 1 tablet (40 mg) by mouth 2 times daily    Class: E-Prescribe    Route: Oral    predniSONE (DELTASONE) 5 MG tablet  90 tablet 1 3/28/2023    [facility] [place] [address] [address]    Sig: Take 1 tablet (5 mg) by mouth daily    Class: E-Prescribe    Notes to Pharmacy: TXP DT 2/28/2023 (Liver) TXP Dischg DT 3/19/2023 DX Liver replaced by transplant Z94.4 [facility] ([place], [place])    Route: Oral    sulfamethoxazole-trimethoprim (BACTRIM) 400-80 MG tablet  36 tablet 3 4/26/2023    [facility] [place] [address] [address]    Sig: Take 1 tablet by mouth three times a week Increase as instructed per transplant team    Class: E-Prescribe    Route: Oral    tacrolimus (GENERIC EQUIVALENT) 0.5 MG capsule  180 capsule 1 5/13/2023    [facility] [address] [address]    Sig: Take 1 capsule (0.5 mg) by mouth every morning Total dose: 3.5 mg in the AM and 3 mg in the PM    Class: E-Prescribe    Notes to Pharmacy: TXP DT 2/28/2023 (Liver) TXP Dischg DT 3/19/2023 DX Liver replaced by transplant Z94.4 [facility] ([place], [place])    Route: Oral    tacrolimus (GENERIC EQUIVALENT) 1 MG capsule  360 capsule 3 5/13/2023    [facility] [place] [address] [address]    Sig: Take 3 capsules (3 mg) by mouth every 12 hours Total dose: 3.5 mg in the AM, 3 mg in the PM    Class: E-Prescribe    Notes to Pharmacy: TXP DT 2/28/2023 (Liver) TXP Dischg DT 3/19/2023 DX Liver replaced by transplant Z94.4 [facility]  [facility] [place] ([place], [place])    Route: Oral    ursodiol (ACTIGALL) 300 MG capsule  180 capsule 3 3/28/2023    [facility] - [place], [place] - [facility] [address]    Sig: Take 1 capsule (300 mg) by mouth 2 times daily    Class: E-Prescribe    Route: Oral    valGANciclovir (VALCYTE) 450 MG tablet  30 tablet 2 3/30/2023    [facility] [place] [address] [address]    Sig: Take 1 tablet (450 mg) by mouth twice a week Increase to 450mg daily when directed by transplant team with improving renal function    Class: E-Prescribe    Route: Oral    Vitamin D3 (CHOLECALCIFEROL) 25 mcg (1000 units) tablet  90 tablet 3 3/28/2023    [facility] - [place] [address]    Si tablet (25 mcg) by Oral or Feeding Tube route daily    Class: E-Prescribe    Route: Oral or Feeding Tube          Patient has no known allergies.   REVIEW OF SYSTEMS (check box if normal)  [x]               GENERAL  [x]                 PULMONARY [x]                GENITOURINARY  [x]                CNS                 [x]                 CARDIAC  [x]                 ENDOCRINE  [x]                EARS,NOSE,THROAT [x]                 GASTROINTESTINAL [x]                 NEUROLOGIC    [x]                MUSCLOSKELTAL  [x]                  HEMATOLOGY      PHYSICAL EXAM (check box if normal)There were no vitals taken for this visit.        [x]            GENERAL:    [x]       EYES:  ICTERIC   []        YES  []                    NO  [x]           EXTREMITIES: Clubbing []       Y     [x]           N    []           EARS, NOSE, THROAT: Membranes Moist    YES   [x]                   NO []                  []           LUNGS:  CLEAR    YES       []                  NO    []                                [x]           SKIN: Jaundice           YES       []                  NO    []                   Rash: YES       []                  NO    []                                      []             HEART: Regular Rate          YES       []                  NO    []                   Incision Clean:  YES       []                  NO    []                                []                    ABDOMEN: Organomegaly YES       []                  NO    []                       [x]                    NEUROLOGICAL:  Nonfocal  YES       [x]                  NO    []                       []                    Hernia YES       []                  NO    [x]                   PSYCHIATRIC:  Appropriate  YES       [x]                  NO    []                       OTHER:                                                                                                   PAIN SCALE:: 1      Again, thank you for allowing me to participate in the care of your patient.        Sincerely,        VAMSHI Crews CNP

## 2023-05-15 NOTE — TELEPHONE ENCOUNTER
Call to Leticia regarding need for liver biopsy due to elevated LFTs. She asked about what the procedure entails, explained procedure to her. Leticia verbalized understanding of need for biopsy and what the procedure entails.    Leticia stated that she and Dillon were fighting a cold or something like it and not feeling the greatest. She requested that Dillon's appointment with NP be changed to a video or telephone visit. Appointment changed to video and Radha Hernandez was notified of the change. Called Leticia back to let her know it is now a video visit.    ADDENDUM:  Leticia called to to let me know that since Dillon didn't have labs drawn today, she got him an appointment for tomorrow morning. Will watch for results tomorrow afternoon.

## 2023-05-16 ENCOUNTER — LAB REQUISITION (OUTPATIENT)
Dept: LAB | Facility: CLINIC | Age: 28
End: 2023-05-16
Payer: COMMERCIAL

## 2023-05-16 ENCOUNTER — VIRTUAL VISIT (OUTPATIENT)
Dept: PHARMACY | Facility: CLINIC | Age: 28
End: 2023-05-16
Payer: COMMERCIAL

## 2023-05-16 DIAGNOSIS — Z94.4 LIVER TRANSPLANT STATUS (H): ICD-10-CM

## 2023-05-16 DIAGNOSIS — E66.9 DIABETES MELLITUS TYPE 2 IN OBESE: Primary | ICD-10-CM

## 2023-05-16 DIAGNOSIS — E11.69 DIABETES MELLITUS TYPE 2 IN OBESE: Primary | ICD-10-CM

## 2023-05-16 LAB
ALBUMIN SERPL-MCNC: 3.5 G/DL (ref 3.5–5)
ALP SERPL-CCNC: 688 U/L (ref 45–120)
ALT SERPL W P-5'-P-CCNC: 74 U/L (ref 0–45)
ANION GAP SERPL CALCULATED.3IONS-SCNC: 7 MMOL/L (ref 5–18)
AST SERPL W P-5'-P-CCNC: 41 U/L (ref 0–40)
BASOPHILS # BLD AUTO: 0 10E3/UL (ref 0–0.2)
BASOPHILS NFR BLD AUTO: 1 %
BILIRUB DIRECT SERPL-MCNC: 0.4 MG/DL
BILIRUB SERPL-MCNC: 0.9 MG/DL (ref 0–1)
BUN SERPL-MCNC: 22 MG/DL (ref 8–22)
CALCIUM SERPL-MCNC: 10.1 MG/DL (ref 8.5–10.5)
CHLORIDE BLD-SCNC: 98 MMOL/L (ref 98–107)
CO2 SERPL-SCNC: 32 MMOL/L (ref 22–31)
CREAT SERPL-MCNC: 0.77 MG/DL (ref 0.7–1.3)
EOSINOPHIL # BLD AUTO: 0 10E3/UL (ref 0–0.7)
EOSINOPHIL NFR BLD AUTO: 1 %
ERYTHROCYTE [DISTWIDTH] IN BLOOD BY AUTOMATED COUNT: 13.2 % (ref 10–15)
GFR SERPL CREATININE-BSD FRML MDRD: >90 ML/MIN/1.73M2
GLUCOSE BLD-MCNC: 84 MG/DL (ref 70–125)
HCT VFR BLD AUTO: 36.1 % (ref 40–53)
HGB BLD-MCNC: 11.2 G/DL (ref 13.3–17.7)
HOLD SPECIMEN: NORMAL
IMM GRANULOCYTES # BLD: 0.1 10E3/UL
IMM GRANULOCYTES NFR BLD: 3 %
LYMPHOCYTES # BLD AUTO: 0.8 10E3/UL (ref 0.8–5.3)
LYMPHOCYTES NFR BLD AUTO: 25 %
MAGNESIUM SERPL-MCNC: 1.5 MG/DL (ref 1.8–2.6)
MCH RBC QN AUTO: 30.6 PG (ref 26.5–33)
MCHC RBC AUTO-ENTMCNC: 31 G/DL (ref 31.5–36.5)
MCV RBC AUTO: 99 FL (ref 78–100)
MONOCYTES # BLD AUTO: 0.1 10E3/UL (ref 0–1.3)
MONOCYTES NFR BLD AUTO: 5 %
NEUTROPHILS # BLD AUTO: 2.1 10E3/UL (ref 1.6–8.3)
NEUTROPHILS NFR BLD AUTO: 65 %
NRBC # BLD AUTO: 0 10E3/UL
NRBC BLD AUTO-RTO: 0 /100
PHOSPHATE SERPL-MCNC: 3.3 MG/DL (ref 2.5–4.5)
PLATELET # BLD AUTO: 193 10E3/UL (ref 150–450)
POTASSIUM BLD-SCNC: 5.5 MMOL/L (ref 3.5–5)
PROT SERPL-MCNC: 6.9 G/DL (ref 6–8)
RBC # BLD AUTO: 3.66 10E6/UL (ref 4.4–5.9)
SODIUM SERPL-SCNC: 137 MMOL/L (ref 136–145)
TACROLIMUS BLD-MCNC: 9.8 UG/L (ref 5–15)
TME LAST DOSE: NORMAL H
TME LAST DOSE: NORMAL H
WBC # BLD AUTO: 3.1 10E3/UL (ref 4–11)

## 2023-05-16 PROCEDURE — 85025 COMPLETE CBC W/AUTO DIFF WBC: CPT | Mod: ORL | Performed by: STUDENT IN AN ORGANIZED HEALTH CARE EDUCATION/TRAINING PROGRAM

## 2023-05-16 PROCEDURE — 99607 MTMS BY PHARM ADDL 15 MIN: CPT | Performed by: PHARMACIST

## 2023-05-16 PROCEDURE — 83735 ASSAY OF MAGNESIUM: CPT | Mod: ORL | Performed by: STUDENT IN AN ORGANIZED HEALTH CARE EDUCATION/TRAINING PROGRAM

## 2023-05-16 PROCEDURE — 80053 COMPREHEN METABOLIC PANEL: CPT | Mod: ORL | Performed by: STUDENT IN AN ORGANIZED HEALTH CARE EDUCATION/TRAINING PROGRAM

## 2023-05-16 PROCEDURE — 36415 COLL VENOUS BLD VENIPUNCTURE: CPT | Mod: ORL | Performed by: STUDENT IN AN ORGANIZED HEALTH CARE EDUCATION/TRAINING PROGRAM

## 2023-05-16 PROCEDURE — 80321 ALCOHOLS BIOMARKERS 1OR 2: CPT | Mod: ORL | Performed by: STUDENT IN AN ORGANIZED HEALTH CARE EDUCATION/TRAINING PROGRAM

## 2023-05-16 PROCEDURE — 82248 BILIRUBIN DIRECT: CPT | Mod: ORL | Performed by: STUDENT IN AN ORGANIZED HEALTH CARE EDUCATION/TRAINING PROGRAM

## 2023-05-16 PROCEDURE — 99606 MTMS BY PHARM EST 15 MIN: CPT | Performed by: PHARMACIST

## 2023-05-16 PROCEDURE — 80197 ASSAY OF TACROLIMUS: CPT | Mod: ORL | Performed by: STUDENT IN AN ORGANIZED HEALTH CARE EDUCATION/TRAINING PROGRAM

## 2023-05-16 PROCEDURE — 84100 ASSAY OF PHOSPHORUS: CPT | Mod: ORL | Performed by: STUDENT IN AN ORGANIZED HEALTH CARE EDUCATION/TRAINING PROGRAM

## 2023-05-16 NOTE — PROGRESS NOTES
Medication Therapy Management (MTM) Encounter    ASSESSMENT:                            Medication Adherence/Access: No issues identified    Type 2 Diabetes: Fasting sugars above goal of  per patient report. Did not have any specific numbers today. Will titrate Lantus.    PLAN:                            1. Increase Lantus to 18 units once daily.     Follow-up:  at 11AM    SUBJECTIVE/OBJECTIVE:                          Dillon Salter is a 27 year old male called for a follow-up visit.  Today's visit is a follow-up MTM visit from . Spoke with Leticia today.     Reason for visit: diabetes follow-up .    Allergies/ADRs: Reviewed in chart  Past Medical History: Reviewed in chart  Tobacco: He reports that he has quit smoking. He has never used smokeless tobacco.  Alcohol: not currently using    Medication Adherence/Access: no issues reported    Type 2 Diabetes:  Currently taking Lantus 15 units every morning, Novolog sliding scale before meals. Patient is not experiencing side effects.  Blood sugar monitorin time(s) daily. Ranges (patient reported):   125-200 per patient.   Fasting sugars running mostly over 130.   Symptoms of low blood sugar? Clammy, weak, irritable, slow. Has not had any low blood sugars since lowering lantus dose.   Symptoms of high blood sugar? none  Diet/Exercise: poor appetite. Sleep schedule is all off.   Lab Results   Component Value Date     A1C 5.2 2023     A1C 6.1 2023     A1C 5.6 2023     A1C 5.4 2022     A1C 7.4 2022    last visit:  Date FBG/ 2hours post Lunch/2hours post Dinner /2hours post    98 143 /206    101 141 /178    42 151 /206    86 141 /178    82 145 /227    97 151 /196     Today's Vitals: There were no vitals taken for this visit.  ----------------    I spent 15 minutes with this patient today. All changes were made via collaborative practice agreement with Dr. Rondon. A copy of the visit note was provided  to the patient's provider(s).    A summary of these recommendations was sent via [employer].    Juan Castro PharmD  [facility] Pharmacist    Phone: [phone]     Telemedicine Visit Details  Type of service:  Telephone visit  Start Time: 11:12 AM  End Time: 11:20 AM     Medication Therapy Recommendations  Diabetes mellitus type 2 in obese (H)    Current Medication: insulin glargine (LANTUS PEN) 100 UNIT/ML pen   Rationale: Dose too low - Dosage too low - Effectiveness   Recommendation: Increase Dose   Status: Accepted per CPA

## 2023-05-16 NOTE — PATIENT INSTRUCTIONS
"Recommendations from today's MTM visit:                                                       1. Increase Lantus to 18 units once daily.     Follow-up: 6/14 at 11 AM    It was great speaking with you today.  I value your experience and would be very thankful for your time in providing feedback in our clinic survey. In the next few days, you may receive an email or text message from [facility] [employer] with a link to a survey related to your  clinical pharmacist.\"     To schedule another MTM appointment, please call the clinic directly or you may call the MTM scheduling line at [phone] or toll-free at [phone].     My Clinical Pharmacist's contact information:                                                      Please feel free to contact me with any questions or concerns you have.      Juan Castro, PharmD  MTM Pharmacist    Phone: [phone]     "

## 2023-05-17 ENCOUNTER — TELEPHONE (OUTPATIENT)
Dept: TRANSPLANT | Facility: CLINIC | Age: 28
End: 2023-05-17

## 2023-05-17 ENCOUNTER — ANCILLARY PROCEDURE (OUTPATIENT)
Dept: ULTRASOUND IMAGING | Facility: CLINIC | Age: 28
End: 2023-05-17
Attending: NURSE PRACTITIONER
Payer: COMMERCIAL

## 2023-05-17 ENCOUNTER — HOSPITAL ENCOUNTER (OUTPATIENT)
Age: 28
End: 2023-05-17
Attending: TRANSPLANT SURGERY
Payer: COMMERCIAL

## 2023-05-17 ENCOUNTER — ANCILLARY PROCEDURE (OUTPATIENT)
Dept: GENERAL RADIOLOGY | Facility: CLINIC | Age: 28
End: 2023-05-17
Attending: NURSE PRACTITIONER
Payer: COMMERCIAL

## 2023-05-17 DIAGNOSIS — Z94.4 LIVER REPLACED BY TRANSPLANT (H): ICD-10-CM

## 2023-05-17 DIAGNOSIS — R79.89 ELEVATED LFTS: ICD-10-CM

## 2023-05-17 PROCEDURE — 74019 RADEX ABDOMEN 2 VIEWS: CPT | Mod: GC | Performed by: RADIOLOGY

## 2023-05-17 PROCEDURE — 93975 VASCULAR STUDY: CPT | Mod: GC | Performed by: RADIOLOGY

## 2023-05-17 NOTE — TELEPHONE ENCOUNTER
Patient Call: General  Route to LPN    Reason for call: mother calling about having some questions on patient's up coming appointment.     Call back needed? Yes    Return Call Needed  Same as documented in contacts section  When to return call?: Same day: Route High Priority

## 2023-05-17 NOTE — TELEPHONE ENCOUNTER
Spoke to Leticia. She is worried about Dillon's liver labs being elevated. Explained that the increased LFTs could be related to bile duct issues or rejection. Dillon is currently at the [facility] having his liver US and just had his xray done. Leticia is still waiting on IR to call to schedule liver biopsy. Will call IR to find out status of scheduling. Discussed possibility of ERCP and explained what that procedure is. Will wait for US results and collaborate with Dr. Sterling and Radha/Vicky to develop a plan.   Leticia would also like to change Dillon's appointment on Monday with Vicky Peres to a virtual visit. Will change if needed.

## 2023-05-17 NOTE — TELEPHONE ENCOUNTER
Leticia called. She is quite concerned that Dillon's liver biopsy has not been scheduled. I reiterated that I would call IR and find out. Reassured her that it will get done. Placed call to IR scheduling, the person I talked to wasn't sure why Leticia had not been called yet and was going to call Leticia to let her know that she is working on it.   Tried to reassure Leticia, as she is worried about Dillon.

## 2023-05-17 NOTE — TELEPHONE ENCOUNTER
Email sent to Dr. Sterling and Vicky Peres in regards to IR saying they do not do liver biopsies on Thursdays or Fridays. Also asked about doing an ERCP, which Vicky agreed would be beneficial and asked Dr. Sterling if he would like one ordered. Waiting on response.

## 2023-05-17 NOTE — TELEPHONE ENCOUNTER
Contacted mother stating Dillon will be admitted to [facility].  Admissions called.  Bed pending

## 2023-05-18 ENCOUNTER — APPOINTMENT (OUTPATIENT)
Dept: MEDSURG UNIT | Facility: CLINIC | Age: 28
End: 2023-05-18
Attending: RADIOLOGY
Payer: COMMERCIAL

## 2023-05-18 ENCOUNTER — TELEPHONE (OUTPATIENT)
Dept: TRANSPLANT | Facility: CLINIC | Age: 28
End: 2023-05-18
Payer: COMMERCIAL

## 2023-05-18 ENCOUNTER — HOSPITAL ENCOUNTER (OUTPATIENT)
Facility: CLINIC | Age: 28
Discharge: HOME | End: 2023-05-18
Attending: RADIOLOGY | Admitting: RADIOLOGY
Payer: COMMERCIAL

## 2023-05-18 ENCOUNTER — APPOINTMENT (OUTPATIENT)
Dept: INTERVENTIONAL RADIOLOGY/VASCULAR | Facility: CLINIC | Age: 28
End: 2023-05-18
Attending: SURGERY
Payer: COMMERCIAL

## 2023-05-18 VITALS
HEART RATE: 67 BPM | HEIGHT: 65 IN | WEIGHT: 147.5 LBS | RESPIRATION RATE: 16 BRPM | SYSTOLIC BLOOD PRESSURE: 116 MMHG | DIASTOLIC BLOOD PRESSURE: 91 MMHG | OXYGEN SATURATION: 100 % | BODY MASS INDEX: 24.57 KG/M2 | TEMPERATURE: 98.7 F

## 2023-05-18 DIAGNOSIS — Z94.4 LIVER REPLACED BY TRANSPLANT (H): ICD-10-CM

## 2023-05-18 DIAGNOSIS — R79.89 ELEVATED LFTS: ICD-10-CM

## 2023-05-18 LAB — INR PPP: 0.98 (ref 0.85–1.15)

## 2023-05-18 PROCEDURE — 999N000142 HC STATISTIC PROCEDURE PREP ONLY

## 2023-05-18 PROCEDURE — 93005 ELECTROCARDIOGRAM TRACING: CPT

## 2023-05-18 PROCEDURE — 36415 COLL VENOUS BLD VENIPUNCTURE: CPT | Performed by: NURSE PRACTITIONER

## 2023-05-18 PROCEDURE — 999N000054 HC STATISTIC EKG NON-CHARGEABLE

## 2023-05-18 PROCEDURE — 99207 EKG 12-LEAD, TRACING ONLY: CPT | Performed by: INTERNAL MEDICINE

## 2023-05-18 PROCEDURE — 76942 ECHO GUIDE FOR BIOPSY: CPT | Mod: 26

## 2023-05-18 PROCEDURE — 272N000505 IR LIVER BIOPSY PERCUTANEOUS

## 2023-05-18 PROCEDURE — 88307 TISSUE EXAM BY PATHOLOGIST: CPT | Mod: 26 | Performed by: PATHOLOGY

## 2023-05-18 PROCEDURE — 99152 MOD SED SAME PHYS/QHP 5/>YRS: CPT

## 2023-05-18 PROCEDURE — 47000 NEEDLE BIOPSY OF LIVER PERQ: CPT

## 2023-05-18 PROCEDURE — 250N000011 HC RX IP 250 OP 636: Performed by: STUDENT IN AN ORGANIZED HEALTH CARE EDUCATION/TRAINING PROGRAM

## 2023-05-18 PROCEDURE — 85610 PROTHROMBIN TIME: CPT | Performed by: NURSE PRACTITIONER

## 2023-05-18 PROCEDURE — 999N000133 HC STATISTIC PP CARE STAGE 2

## 2023-05-18 PROCEDURE — 88313 SPECIAL STAINS GROUP 2: CPT | Mod: TC | Performed by: SURGERY

## 2023-05-18 PROCEDURE — 88313 SPECIAL STAINS GROUP 2: CPT | Mod: 26 | Performed by: PATHOLOGY

## 2023-05-18 RX ORDER — FENTANYL CITRATE 50 UG/ML
25-50 INJECTION, SOLUTION INTRAMUSCULAR; INTRAVENOUS EVERY 5 MIN PRN
Status: DISCONTINUED | OUTPATIENT
Start: 2023-05-18 | End: 2023-05-18 | Stop reason: HOSPADM

## 2023-05-18 RX ORDER — NALOXONE HYDROCHLORIDE 0.4 MG/ML
0.2 INJECTION, SOLUTION INTRAMUSCULAR; INTRAVENOUS; SUBCUTANEOUS
Status: DISCONTINUED | OUTPATIENT
Start: 2023-05-18 | End: 2023-05-18 | Stop reason: HOSPADM

## 2023-05-18 RX ORDER — FLUMAZENIL 0.1 MG/ML
0.2 INJECTION, SOLUTION INTRAVENOUS
Status: DISCONTINUED | OUTPATIENT
Start: 2023-05-18 | End: 2023-05-18 | Stop reason: HOSPADM

## 2023-05-18 RX ORDER — NALOXONE HYDROCHLORIDE 0.4 MG/ML
0.4 INJECTION, SOLUTION INTRAMUSCULAR; INTRAVENOUS; SUBCUTANEOUS
Status: DISCONTINUED | OUTPATIENT
Start: 2023-05-18 | End: 2023-05-18 | Stop reason: HOSPADM

## 2023-05-18 RX ORDER — LIDOCAINE 40 MG/G
CREAM TOPICAL
Status: DISCONTINUED | OUTPATIENT
Start: 2023-05-18 | End: 2023-05-18 | Stop reason: HOSPADM

## 2023-05-18 RX ADMIN — MIDAZOLAM HYDROCHLORIDE 0.5 MG: 2 INJECTION, SOLUTION INTRAMUSCULAR; INTRAVENOUS at 13:21

## 2023-05-18 RX ADMIN — FENTANYL CITRATE 25 MCG: 50 INJECTION, SOLUTION INTRAMUSCULAR; INTRAVENOUS at 13:00

## 2023-05-18 RX ADMIN — FENTANYL CITRATE 25 MCG: 50 INJECTION, SOLUTION INTRAMUSCULAR; INTRAVENOUS at 13:21

## 2023-05-18 RX ADMIN — MIDAZOLAM HYDROCHLORIDE 0.5 MG: 2 INJECTION, SOLUTION INTRAMUSCULAR; INTRAVENOUS at 13:01

## 2023-05-18 ASSESSMENT — ACTIVITIES OF DAILY LIVING (ADL)
ADLS_ACUITY_SCORE: 35
ADLS_ACUITY_SCORE: 35

## 2023-05-18 NOTE — PROGRESS NOTES
Pt on 2A #2 prepped for IR transplant liver biopsy. No active complaints. Plan of care discussed. Prep complete apart from INR result. Last 81mg ASA yesterday at 1300. Pt reports last dose of all routine meds yesterday. No further needs.

## 2023-05-18 NOTE — TELEPHONE ENCOUNTER
Per Martha ROWLAND may do biopsy as outpatient instead of admission.     Attempted to reach Leticia to see when last time patient had anything to eat or drink. No answer. Left message. Attempted three times to reach and was able to be in contact.     Dillon  last ate at 7pm. Updated Martha. Per Martha IR will contact family directly to set up biopsy today. Leticia is aware of the plan for biopsy as outpatient. Leticia to keep Dillon MARIEEO.

## 2023-05-18 NOTE — DISCHARGE INSTRUCTIONS
-You may shower tomorrow. Remove dressing after showering. Replace with band-aid for 2 days.  -No strenuous exercise or lifting more than 10 pounds for 5 days.  -If you bleed, hold direct pressure for 5 minutes and call us.  -Any other problems or questions, please call the number below.    [facility]    Interventional Radiology  Patient Instructions Following Biopsy    AFTER YOU GO HOME  If you were given sedation DO NOT drive or operate machinery at home or at work for at least 24 hours  DO relax and take it easy for 48 hours, no strenuous activity for 24 hours  DO drink plenty of fluids  DO resume your regular diet, unless otherwise instructed by your Primary Physician  Keep the dressing dry and in place for 24 hours.  DO NOT SMOKE FOR AT LEAST 24 HOURS, if you have been given any medications that were to help you relax or sedate you during your procedure  DO NOT drink alcoholic beverages the day of your procedure  DO NOT do any strenuous exercise or lifting (> 10 lbs) for at least 5 days following your procedure  DO NOT take a bath or shower for at least 12 hours following your procedure  Remove dressing after shower the next day. Replace with Band aid for 2 days.  Never leave a wet dressing in place.  DO NOT make any important or legal decisions for 24 hours following your procedure  There should be minimum drainage from the biopsy site    CALL THE PHYSICIAN IF:  You start bleeding from the procedure site.  If you do start to bleed from that site, lie down flat and hold pressure on the site for a minimum of 10 minutes.  Your physician will tell you if you need to return to the hospital  You develop nausea or vomiting  You have excessive swelling, redness, or tenderness at the site  You have drainage that looks like it is infected.  You experience severe pain  You develop hives or a rash or unexplained itching  You develop shortness of breath  You develop a temperature of 101 degrees F or  [facility] INTERVENTIONAL RADIOLOGY DEPARTMENT  Procedure Physician: Dr Parkinson & Dr Richter                                     Date of procedure: May 18, 2023  Telephone Numbers: [phone]      Monday-Friday 7:30 am to 4:00 pm  [phone] After 4:00 pm Monday-Friday, Weekends & Holidays.   Ask for the Interventional Radiologist on call.  Someone is on call 24 hrs/day  [facility] toll free number: [phone] Monday-Friday 8:00 am to 4:30 pm  [facility] Emergency Dept: [phone]

## 2023-05-18 NOTE — PROGRESS NOTES
Patient tolerated recovery stage well. VSS, right abdomen site clean/dry/intact, no hematoma, and denies pain. Patient tolerated PO food and fluids. Teaching was done and discharge instructions were given. Patient ambulated, voided, and PIV was removed. Patient discharged from the hospital via wheel chair to home with mother.

## 2023-05-18 NOTE — PRE-PROCEDURE
GENERAL PRE-PROCEDURE:   Procedure:  Image-guided liver transplant biopsy  Date/Time:  5/18/2023 11:57 AM    Verbal consent obtained?: Yes    Written consent obtained?: Yes    Risks and benefits: Risks, benefits and alternatives were discussed    Consent given by:  Patient  Patient states understanding of procedure being performed: Yes    Patient's understanding of procedure matches consent: Yes    Procedure consent matches procedure scheduled: Yes    Expected level of sedation:  Moderate  Appropriately NPO:  Yes  ASA Class:  3  Mallampati  :  Grade 2- soft palate, base of uvula, tonsillar pillars, and portion of posterior pharyngeal wall visible  Lungs:  Lungs clear with good breath sounds bilaterally  Heart:  Normal heart sounds and rate  History & Physical reviewed:  History and physical reviewed and no updates needed  Statement of review:  I have reviewed the lab findings, diagnostic data, medications, and the plan for sedation

## 2023-05-18 NOTE — PROCEDURES
[facility]    Procedure: IR US-guided random transplant liver biopsy    Date/Time: 5/18/2023 2:10 PM    Performed by: Marah Rascon PA-C  Authorized by: Marah Rascon PA-C  IR Fellow Physician:  Other(s) attending procedure: Chad Mtz PA-C      UNIVERSAL PROTOCOL   Site Marked: NA  Prior Images Obtained and Reviewed:  Yes  Required items: Required blood products, implants, devices and special equipment available    Patient identity confirmed:  Verbally with patient, arm band, provided demographic data and hospital-assigned identification number  Patient was reevaluated immediately before administering moderate or deep sedation or anesthesia  Confirmation Checklist:  Patient's identity using two indicators, relevant allergies, procedure was appropriate and matched the consent or emergent situation and correct equipment/implants were available  Time out: Immediately prior to the procedure a time out was called    Universal Protocol: the Joint Commission Universal Protocol was followed    Preparation: Patient was prepped and draped in usual sterile fashion    ESBL (mL):  0.5     ANESTHESIA    Anesthesia: Local infiltration  Local Anesthetic:  Lidocaine 1% without epinephrine  Anesthetic Total (mL):  5      SEDATION  Patient Sedated: Yes    Vital signs: Vital signs monitored during sedation    See dictated procedure note for full details.  Findings: Patient tolerated well.    Specimens: fluid and/or tissue for laboratory analysis (2 core samples)    Complications: None    Condition: Stable    Plan: Follow-up per primary team. Bedrest x 1 hr. No heavy lifting/vigorous exercise x 3 days.      PROCEDURE  Describe Procedure: US-guided percutaneous random transplant liver biopsy. 2 core samples obtained. Biopsy tract packed with gel foam.  Patient Tolerance:  Patient tolerated the procedure well with no immediate complications  Length of time physician/provider  present for 1:1 monitoring during sedation: 45 (minutes)

## 2023-05-18 NOTE — PROGRESS NOTES
Patient Name: Dillon Salter  Medical Record Number: [id number]  Today's Date: 5/18/2023    Procedure: Image guided transplanted liver biopsy  Proceduralist: Bebe Davidson  Pathology present: No    Procedure Start: 1322  Procedure end: 1406  Sedation medications administered: Fentanyl 50mcg and Versed 1mg     Report given to: Mahendra  : AMILCAR    Other Notes: Pt arrived to IR room 6 from . Consent reviewed. Pt denies any questions or concerns regarding procedure. Pt positioned Supine and monitored per protocol. Pt tolerated procedure without any noted complications. Pt transferred back to .

## 2023-05-19 LAB
ATRIAL RATE - MUSE: 63 BPM
DIASTOLIC BLOOD PRESSURE - MUSE: NORMAL MMHG
INTERPRETATION ECG - MUSE: NORMAL
P AXIS - MUSE: 24 DEGREES
PATH REPORT.COMMENTS IMP SPEC: NORMAL
PATH REPORT.FINAL DX SPEC: NORMAL
PATH REPORT.GROSS SPEC: NORMAL
PATH REPORT.MICROSCOPIC SPEC OTHER STN: NORMAL
PATH REPORT.RELEVANT HX SPEC: NORMAL
PHOTO IMAGE: NORMAL
PR INTERVAL - MUSE: 158 MS
QRS DURATION - MUSE: 86 MS
QT - MUSE: 408 MS
QTC - MUSE: 417 MS
R AXIS - MUSE: 20 DEGREES
SYSTOLIC BLOOD PRESSURE - MUSE: NORMAL MMHG
T AXIS - MUSE: 12 DEGREES
VENTRICULAR RATE- MUSE: 63 BPM

## 2023-05-22 ENCOUNTER — VIRTUAL VISIT (OUTPATIENT)
Dept: TRANSPLANT | Facility: CLINIC | Age: 28
End: 2023-05-22
Attending: SURGERY
Payer: COMMERCIAL

## 2023-05-22 ENCOUNTER — TELEPHONE (OUTPATIENT)
Dept: TRANSPLANT | Facility: CLINIC | Age: 28
End: 2023-05-22
Payer: COMMERCIAL

## 2023-05-22 ENCOUNTER — PREP FOR PROCEDURE (OUTPATIENT)
Dept: GASTROENTEROLOGY | Facility: CLINIC | Age: 28
End: 2023-05-22

## 2023-05-22 ENCOUNTER — PATIENT OUTREACH (OUTPATIENT)
Dept: GASTROENTEROLOGY | Facility: CLINIC | Age: 28
End: 2023-05-22

## 2023-05-22 DIAGNOSIS — R79.89 ELEVATED LFTS: ICD-10-CM

## 2023-05-22 DIAGNOSIS — Z94.4 LIVER REPLACED BY TRANSPLANT (H): Primary | ICD-10-CM

## 2023-05-22 DIAGNOSIS — Z94.4 LIVER REPLACED BY TRANSPLANT (H): ICD-10-CM

## 2023-05-22 DIAGNOSIS — R79.89 ELEVATED LFTS: Primary | ICD-10-CM

## 2023-05-22 LAB
LABORATORY REPORT: NORMAL
PLPETH BLD-MCNC: <10 NG/ML
POPETH BLD-MCNC: <10 NG/ML

## 2023-05-22 PROCEDURE — 99213 OFFICE O/P EST LOW 20 MIN: CPT | Mod: VID | Performed by: NURSE PRACTITIONER

## 2023-05-22 ASSESSMENT — PAIN SCALES - GENERAL: PAINLEVEL: NO PAIN (0)

## 2023-05-22 NOTE — PROGRESS NOTES
Please assist in scheduling:     Procedure/Imaging/Clinic: ERCP  Physician: Kyle  Timing: Next available  Scope time: Provider average  Anesthesia: General  Dx: Elevated LFTs  Tier:2  Location: [facility]   Patient communication letter header: ERCP (Endoscopic Retrograde Cholangiopancreatography)

## 2023-05-22 NOTE — PROGRESS NOTES
Patient's mom called back to let me know the soonest appointment they could get for MRCP was 6/15/23. Discussed other possible dates after that time. Leticia is agreeable to 6/21/23 for ERCP. Dillon will see PCP on 6/19/23 which will serve as a pre-op visit.     Marilu Levine RN Care Coordinator

## 2023-05-22 NOTE — PROGRESS NOTES
Following review of referral by Dr. Sterling, per Dr. Wright: Please schedule pt for ERCP with me.     Please assist in scheduling:     Procedure/Imaging/Clinic: ERCP  Physician: Kyle  Timing: Next available  Scope time: Provider average  Anesthesia: General  Dx: Elevated LFTs  Tier:2  Location: [facility]   Patient communication letter header: ERCP (Endoscopic Retrograde Cholangiopancreatography)     Comments: Offer 6/12/23. Goal for MRCP prior.     Called to discuss with patient. Explained they will need a , someone to stay with them for 24 hours and should stay in town for 24 hours (within 45 min of Hospital) post procedure.    Patient will need a pre-op physical within 30 days of procedure. If outside [facility] system, will need physical faxed to number [phone]   If you do not get a preop physical, your procedure could be cancelled, patient voiced understanding*    Preop Plan: Appointment with Judith 5/16/23 available in Care Everywhere    Med Review    Blood thinner -  None  ASA - 81 mg; no hold per AE policy  Diabetic - Novolog / Lantus    COVID monitoring discussed: MyChart    Patient Education r/t procedure: MyChart    Does patient have any history of gastric bypass/gastric surgery/altered panc/bili anatomy? Liver transplant 2/2023    A pre-op nurse will call 1-2 days prior to the procedure.    NPO/Prep: No solid food 8 hours prior to arrival at the hospital. Clear liquids okay until one hour prior to arrival.     Verbalized understanding of all instructions. All questions answered. Clinic contact and scheduling numbers verified for future questions/concerns.    Marilu Levine RN, BSN  Care Coordinator  [facility]

## 2023-05-22 NOTE — TELEPHONE ENCOUNTER
"Email sent to Dr. Sterling 5/19 regarding liver biopsy results and whether he wanted an ERCP to be ordered. His response:  \"I would start with MRCP and follow up with ERCP\"    MRCP ordered. Message to Zahraa Camacho to schedule ERCP.    Per Leticia's request last week, appointment this afternoon changed to video visit with Vicky Peres. Vicky ok with change.     Call to Leticia. Reviewed biopsy results, no rejection but does show an obstruction or stricture. Explained the above information and plan. She verbalizes understanding of and agrees with plan.   "

## 2023-05-22 NOTE — LETTER
2023         RE: Dillon Salter  [address] [place]  [place] [address]        Dear Colleague,    Thank you for referring your patient, Dillon Salter, to the [facility]. Please see a copy of my visit note below.    Dillon is a 27 year old who is being evaluated via a billable video visit.      How would you like to obtain your AVS? MyChart  If the video visit is dropped, the invitation should be resent by: Send to e-mail at: [email or web address]  Will anyone else be joining your video visit? No        Video-Visit Details    Type of service:  Video Visit   Video Start Time: 1:29 PM  Video End Time:1:50 PM    Originating Location (pt. Location): Home    Distant Location (provider location):  On-site  Platform used for Video Visit: [facility]     Transplant Surgery -OUTPATIENT IMMUNOSUPPRESSION PROGRESS NOTE    Date of Visit: 2023    Transplants:  2023 (Liver); Postoperative day:  83  ASSESMENT AND PLAN:  1.Graft Function: elevated alk phos.  Biopsy negative for rejection.  MRCP ordered will need schedule repeat labs   2.Immunosuppression Management: continue same IS  3.Hypertension: stable  4.Renal Function: new labs pending mother will schedule.   5.Lab frequency: as directed  6.Other: mrcp will be scheduled and then ERCP     Date: May 22, 2023    Transplant:  [x]                             Liver []                              Kidney []                             Pancreas []                              Other:             Chief Complaint:Video Visit (Follow-up S/P Liver Transplant 2023)    History of Present Illness:Dillon Salter is an 27 year old male with a past medical history of Asperger's, DM2, and alcoholic cirrhosis c/b esophageal varices and hepatic encephalopathy. He is now s/p  donor liver transplant without stent on 23 with Dr. Sterling. Returned to OR for washout and repair of arterial bleeding on 3/1/23. Patient was discharged home on  3/19, now being readmitted due to cavitary lesion on CXR and inability to be placed for HD.     Doing well.  No acute complaints.  No nausea or vomitng    Discharged 3/22/2023  Patient Active Problem List   Diagnosis    Alcoholic cirrhosis of liver with ascites (H)    Diabetes mellitus type 2 in obese (H)    Autism spectrum disorder    Benign essential hypertension    Anxiety and depression    Balanitis    Acute posthemorrhagic anemia    Hepatic encephalopathy    Anemia in other chronic diseases classified elsewhere    Hyperglycemia    Thrombocytopenia (H)    Hematemesis    Acute kidney failure, unspecified (H)    Liver replaced by transplant (H)    Immunosuppressed status (H)    Diarrhea    Severe malnutrition (H)    Delirium    Acute post-operative pain    Other chronic pain    Cavitary lesion of lung    Hypomagnesemia     SOCIAL /FAMILY HISTORY: [x]                  No recent change    Past Medical History:   Diagnosis Date    Acute on chronic anemia 04/08/2022    Alcohol use disorder     Alcohol use disorder, severe, dependence (H) 07/11/2022    Alcoholic cirrhosis of liver with ascites (H)     Alcoholic hepatitis     Autism spectrum disorder 04/08/2022    High functioning autistic.  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 lives with parents (Leticia and Wes) socially isolated drinks alcohol beer daily     Benign essential hypertension     Bleeding esophageal varices (H) 06/18/2022    Hepatic encephalopathy (H)     Hyponatremia 07/28/2022    Major depressive disorder     Type II Diabetes      Past Surgical History:   Procedure Laterality Date    BENCH LIVER  2/28/2023    Procedure: Bench liver;  Surgeon: Thelma Sterling MD;  [facility]    COLONOSCOPY N/A 1/27/2023    Procedure: Colonoscopy;  Surgeon: Osman Montoya MD;  [facility]    ENDOSCOPIC ULTRASOUND LOWER  GASTROINTESTIONAL TRACT (GI) N/A 2023    Procedure: ULTRASOUND, LOWER GI TRACT, ENDOSCOPIC with glueing;  Surgeon: Osman Montoya MD;  [facility]    ESOPHAGOSCOPY, GASTROSCOPY, DUODENOSCOPY (EGD), COMBINED N/A 2022    Procedure: ESOPHAGOGASTRODUODENOSCOPY (EGD) with esophageal banding;  Surgeon: Gary Hurst MD;  Location: [facility]    ESOPHAGOSCOPY, GASTROSCOPY, DUODENOSCOPY (EGD), COMBINED N/A 2022    Procedure: ESOPHAGOGASTRODUODENOSCOPY;  Surgeon: Gavino Reza MD;  Location: [facility]    ESOPHAGOSCOPY, GASTROSCOPY, DUODENOSCOPY (EGD), COMBINED N/A 2023    Procedure: ESOPHAGOGASTRODUODENOSCOPY (EGD) with esophageal variceal banding;  Surgeon: Juan Boo MD;  Location: [facility]    ESOPHAGOSCOPY, GASTROSCOPY, DUODENOSCOPY (EGD), COMBINED N/A 2023    Procedure: ESOPHAGOGASTRODUODENOSCOPY (EGD);  Surgeon: Juan Boo MD;  Location: [facility]    ESOPHAGOSCOPY, GASTROSCOPY, DUODENOSCOPY (EGD), COMBINED N/A 2023    Procedure: Esophagoscopy, gastroscopy, duodenoscopy (EGD), combined;  Surgeon: Osman Montoya MD;  [facility]    ESOPHAGOSCOPY, GASTROSCOPY, DUODENOSCOPY (EGD), COMBINED N/A 2023    Procedure: ESOPHAGOGASTRODUODENOSCOPY (EGD);  Surgeon: Leventhal, Thomas Michael, MD;  [facility]    IR CVC NON TUNNEL LINE REMOVAL  3/10/2023    IR CVC TUNNEL PLACEMENT > 5 YRS OF AGE  3/10/2023    IR CVC TUNNEL REMOVAL RIGHT  2023    IR LIVER BIOPSY PERCUTANEOUS  2023    MIDLINE DOUBLE LUMEN PLACEMENT  2022         PICC TRIPLE LUMEN PLACEMENT  2022         RETURN LIVER TRANSPLANT N/A 3/1/2023    Procedure: RETURN TO OPERATING ROOM, AFTER LIVER TRANSPLANT;  Surgeon: Thelma Sterling MD;  [facility]    TRANSPLANT LIVER RECIPIENT  DONOR N/A 2023    Procedure: Transplant liver recipient  donor;  Surgeon: Thelma Sterling MD;  Location:  OR     [facility] History      Socioeconomic History    Marital status: Single     Spouse name: Not on file    Number of children: Not on file    Years of education: Not on file    Highest education level: Not on file   Occupational History    Not on file   Tobacco Use    Smoking status: Former    Smokeless tobacco: Never    Tobacco comments:     A pack lasted a year, hardly smoked   Vaping Use    Vaping status: Former   Substance and Sexual Activity    Alcohol use: Not Currently     Comment: No ETOH since 20    Drug use: Not Currently     Types: Marijuana    Sexual activity: Not on file   Other Topics Concern    Not on file   Social History Narrative    26-year-old history of autism/Asperger's on the spectrum graduated high school at [employer] [place] worked at [employer] and then another  place until the Covid epidemic in 2020 lives with parents (Leticia and Wes) socially isolated drinks alcohol beer daily        End-stage cirrhosis noted on admission to  2022     Social Determinants of Health     Financial Resource Strain: Not on file   Food Insecurity: Not on file   Transportation Needs: Not on file   Physical Activity: Not on file   Stress: Not on file   Social Connections: Not on file   Intimate Partner Violence: Not on file   Housing Stability: Not on file     Prescription Medications as of 2023         Rx Number Disp Refills Start End Last Dispensed Date Next Fill Date Owning Pharmacy    acetaminophen (TYLENOL) 325 MG tablet  100 tablet 0 3/19/2023    [facility] - [place], [place] - [address]    Si tablets (650 mg) by Oral or Feeding Tube route every 8 hours as needed for mild pain or fever    Class: E-Prescribe    Route: Oral or Feeding Tube    Renewals       Renewal requests to authorizing provider (Julieth Leblanc NP) <b>prohibited</b>            aspirin (ASA) 81 MG EC tablet  30 tablet 0 2023    [facility] - [place], [place] - [address]    Sig: Take 1  tablet (81 mg) by mouth daily    Class: E-Prescribe    Notes to Pharmacy: Will purchase OTC.    Route: Oral    FLUoxetine (PROZAC) 20 MG capsule  90 capsule 0 2023        Sig: Take 3 capsules (60 mg) by mouth At Bedtime    Class: Historical    Route: Oral    insulin aspart (NOVOLOG FLEXPEN) 100 UNIT/ML pen  15 mL 3 2022    [id number] [address]  AT [facility]    Si unit per 10 grams of carb, plus additional units based on following scale   For Pre-Meal  - 164 give 1 unit. For Pre-Meal  - 189 give 2 units. For Pre-Meal  - 214 give 3 units. For Pre-Meal  - 239 give 4 units. For Pre-Meal  - 264 give 5 units. For Pre-Meal  - 289 give 6 units. For Pre-Meal  - 314 give 7 units. For Pre-Meal  - 339 give 8 units. For Pre-Meal  - 364 give 9 units.  For Pre-Meal BG greater than or equal to 365 give 10 units    Class: No Print Out    insulin glargine (LANTUS PEN) 100 UNIT/ML pen  15 mL 0 3/19/2023    [facility] [place], [place] - [address] [place] [address]    Sig: Inject 15 Units Subcutaneous every morning AND 10 Units At Bedtime.    Class: E-Prescribe    Notes to Pharmacy: If Lantus is not covered by insurance, may substitute Basaglar or Semglee or other insulin glargine product per insurance preference at same dose and frequency.      Route: Subcutaneous    Renewals       Renewal requests to authorizing provider (Julieth Leblanc NP) <b>prohibited</b>            magnesium oxide (MAG-OX) 400 MG tablet  120 tablet 3 2023    [facility] - [place], [place] - [address] [place] [address] [facility]    Sig: Take 2 tablets (800 mg) by mouth 2 times daily    Class: E-Prescribe    Route: Oral    metoprolol tartrate (LOPRESSOR) 25 MG tablet  60 tablet 1 3/17/2023    [facility] - [place], [place] - [address]    Sig: Take 1 tablet (25 mg) by mouth 2 times daily     Class: E-Prescribe    Route: Oral    Renewals       Renewal requests to authorizing provider (Julieth Leblanc NP) <b>prohibited</b>            multivitamin w/minerals (THERA-VIT-M) tablet            Sig: Take 1 tablet by mouth daily    Class: Historical    Route: Oral    mycophenolate (GENERIC EQUIVALENT) 250 MG capsule  540 capsule 3 3/28/2023    [facility] [place] [address]    Sig: Take 3 capsules (750 mg) by mouth 2 times daily    Class: E-Prescribe    Notes to Pharmacy: TXP DT 2/28/2023 (Liver) TXP Dischg DT 3/19/2023 DX Liver replaced by transplant Z94.4 TX [facility] ([place], [place])    Route: Oral    oxyCODONE (ROXICODONE) 5 MG tablet            Sig: Take 5 mg by mouth every 6 hours as needed for severe pain From PCP    Class: Historical    Route: Oral    pantoprazole (PROTONIX) 40 MG EC tablet  180 tablet 3 3/28/2023    [facility] [place] [address]    Sig: Take 1 tablet (40 mg) by mouth 2 times daily    Class: E-Prescribe    Route: Oral    predniSONE (DELTASONE) 5 MG tablet  90 tablet 1 3/28/2023    [facility] [place] [address]    Sig: Take 1 tablet (5 mg) by mouth daily    Class: E-Prescribe    Notes to Pharmacy: TXP DT 2/28/2023 (Liver) TXP Dischg DT 3/19/2023 DX Liver replaced by transplant Z94.4 [facility] ([place], [place])    Route: Oral    sulfamethoxazole-trimethoprim (BACTRIM) 400-80 MG tablet  36 tablet 3 4/26/2023    [facility] [place] [address]    Sig: Take 1 tablet by mouth three times a week Increase as instructed per transplant team    Class: E-Prescribe    Route: Oral    tacrolimus (GENERIC EQUIVALENT) 0.5 MG capsule  180 capsule 1 5/13/2023    [facility] [place] [address]    Sig: Take 1  capsule (0.5 mg) by mouth every morning Total dose: 3.5 mg in the AM and 3 mg in the PM    Class: E-Prescribe    Notes to Pharmacy: TXP DT 2023 (Liver) TXP Dischg DT 3/19/2023 DX Liver replaced by transplant Z94.4 TX [facility] ([place], [place])    Route: Oral    tacrolimus (GENERIC EQUIVALENT) 1 MG capsule  360 capsule 3 2023    [facility] [place] [address]    Sig: Take 3 capsules (3 mg) by mouth every 12 hours Total dose: 3.5 mg in the AM, 3 mg in the PM    Class: E-Prescribe    Notes to Pharmacy: TXP DT 2023 (Liver) TXP Dischg DT 3/19/2023 DX Liver replaced by transplant Z94.4 TX [facility] ([place], [place])    Route: Oral    ursodiol (ACTIGALL) 300 MG capsule  180 capsule 3 3/28/2023    [facility] [address] [address]    Sig: Take 1 capsule (300 mg) by mouth 2 times daily    Class: E-Prescribe    Route: Oral    valGANciclovir (VALCYTE) 450 MG tablet  30 tablet 2 3/30/2023    [facility] [address] [address]    Sig: Take 1 tablet (450 mg) by mouth twice a week Increase to 450mg daily when directed by transplant team with improving renal function    Class: E-Prescribe    Route: Oral    Vitamin D3 (CHOLECALCIFEROL) 25 mcg (1000 units) tablet  90 tablet 3 3/28/2023    [facility] [address]    Si tablet (25 mcg) by Oral or Feeding Tube route daily    Class: E-Prescribe    Route: Oral or Feeding Tube          Patient has no known allergies.   REVIEW OF SYSTEMS (check box if normal)  [x]               GENERAL  [x]                 PULMONARY [x]                GENITOURINARY  [x]                CNS                 [x]                 CARDIAC  [x]                 ENDOCRINE  [x]                EARS,NOSE,THROAT [x]                 GASTROINTESTINAL [x]                  NEUROLOGIC    [x]                MUSCLOSKELTAL  [x]                  HEMATOLOGY      PHYSICAL EXAM (check box if normal)There were no vitals taken for this visit.        [x]            GENERAL:    [x]       EYES:  ICTERIC   []        YES  []                    NO  [x]           EXTREMITIES: Clubbing []       Y     [x]           N    [x]           EARS, NOSE, THROAT: Membranes Moist    YES   [x]                   NO []                  [x]           LUNGS:  CLEAR    YES       [x]                  NO    []                                [x]           SKIN: Jaundice           YES       []                  NO    [x]                   Rash: YES       []                  NO    [x]                                     [x]             HEART: Regular Rate          YES       [x]                  NO    []                   Incision Clean:  YES       [x]                  NO    []                                [x]                    ABDOMEN: Organomegaly YES       []                  NO    [x]                       [x]                    NEUROLOGICAL:  Nonfocal  YES       [x]                  NO    []                       [x]                    Hernia YES       []                  NO    [x]                   PSYCHIATRIC:  Appropriate  YES       [x]                  NO    []                       OTHER:                                                                                                   PAIN SCALE:: 1          Vicky Peres, NP

## 2023-05-22 NOTE — PROGRESS NOTES
Dillon is a 27 year old who is being evaluated via a billable video visit.      How would you like to obtain your AVS? MyChart  If the video visit is dropped, the invitation should be resent by: Send to e-mail at: [email or web address]  Will anyone else be joining your video visit? No        Video-Visit Details    Type of service:  Video Visit   Video Start Time: 1:29 PM  Video End Time:1:50 PM    Originating Location (pt. Location): Home    Distant Location (provider location):  On-site  Platform used for Video Visit: [facility]     Transplant Surgery -OUTPATIENT IMMUNOSUPPRESSION PROGRESS NOTE    Date of Visit: 2023    Transplants:  2023 (Liver); Postoperative day:  83  ASSESMENT AND PLAN:  1.Graft Function: elevated alk phos.  Biopsy negative for rejection.  MRCP ordered will need schedule repeat labs   2.Immunosuppression Management: continue same IS  3.Hypertension: stable  4.Renal Function: new labs pending mother will schedule.   5.Lab frequency: as directed  6.Other: mrcp will be scheduled and then ERCP     Date: May 22, 2023    Transplant:  [x]                             Liver []                              Kidney []                             Pancreas []                              Other:             Chief Complaint:Video Visit (Follow-up S/P Liver Transplant 2023)    History of Present Illness:Dillon Salter is an 27 year old male with a past medical history of Asperger's, DM2, and alcoholic cirrhosis c/b esophageal varices and hepatic encephalopathy. He is now s/p  donor liver transplant without stent on 23 with Dr. Sterling. Returned to OR for washout and repair of arterial bleeding on 3/1/23. Patient was discharged home on 3/19, now being readmitted due to cavitary lesion on CXR and inability to be placed for HD.     Doing well.  No acute complaints.  No nausea or vomitng    Discharged 3/22/2023  Patient Active Problem List   Diagnosis     Alcoholic cirrhosis of liver with  ascites (H)     Diabetes mellitus type 2 in obese (H)     Autism spectrum disorder     Benign essential hypertension     Anxiety and depression     Balanitis     Acute posthemorrhagic anemia     Hepatic encephalopathy     Anemia in other chronic diseases classified elsewhere     Hyperglycemia     Thrombocytopenia (H)     Hematemesis     Acute kidney failure, unspecified (H)     Liver replaced by transplant (H)     Immunosuppressed status (H)     Diarrhea     Severe malnutrition (H)     Delirium     Acute post-operative pain     Other chronic pain     Cavitary lesion of lung     Hypomagnesemia     SOCIAL /FAMILY HISTORY: [x]                  No recent change    Past Medical History:   Diagnosis Date     Acute on chronic anemia 04/08/2022     Alcohol use disorder      Alcohol use disorder, severe, dependence (H) 07/11/2022     Alcoholic cirrhosis of liver with ascites (H)      Alcoholic hepatitis      Autism spectrum disorder 04/08/2022    High functioning autistic.  26-year-old history of autism/Asperger's on the spectrum graduated high school at [employer]] worked at [employer] and then another  place until the Covid epidemic in 2020 lives with parents (Leticia and Wes) socially isolated drinks alcohol beer daily      Benign essential hypertension      Bleeding esophageal varices (H) 06/18/2022     Hepatic encephalopathy (H)      Hyponatremia 07/28/2022     Major depressive disorder      Type II Diabetes      Past Surgical History:   Procedure Laterality Date     BENCH LIVER  2/28/2023    Procedure: Bench liver;  Surgeon: Thelma Sterling MD;  Location: [facility]     COLONOSCOPY N/A 1/27/2023    Procedure: Colonoscopy;  Surgeon: Osman Montoya MD;  Location: [facility]     ENDOSCOPIC ULTRASOUND LOWER GASTROINTESTIONAL TRACT (GI) N/A 1/27/2023    Procedure: ULTRASOUND, LOWER GI TRACT, ENDOSCOPIC with glueing;  Surgeon: Osman Montoya MD;  Location: [facility]     ESOPHAGOSCOPY, GASTROSCOPY, DUODENOSCOPY (EGD), COMBINED N/A  2022    Procedure: ESOPHAGOGASTRODUODENOSCOPY (EGD) with esophageal banding;  Surgeon: Gary Hurst MD;  Location: [facility]     ESOPHAGOSCOPY, GASTROSCOPY, DUODENOSCOPY (EGD), COMBINED N/A 2022    Procedure: ESOPHAGOGASTRODUODENOSCOPY;  Surgeon: Gavino Reza MD;  Location: [facility]     ESOPHAGOSCOPY, GASTROSCOPY, DUODENOSCOPY (EGD), COMBINED N/A 2023    Procedure: ESOPHAGOGASTRODUODENOSCOPY (EGD) with esophageal variceal banding;  Surgeon: Juan Boo MD;  Location: [facility]     ESOPHAGOSCOPY, GASTROSCOPY, DUODENOSCOPY (EGD), COMBINED N/A 2023    Procedure: ESOPHAGOGASTRODUODENOSCOPY (EGD);  Surgeon: Juan Boo MD;  Location: [facility]     ESOPHAGOSCOPY, GASTROSCOPY, DUODENOSCOPY (EGD), COMBINED N/A 2023    Procedure: Esophagoscopy, gastroscopy, duodenoscopy (EGD), combined;  Surgeon: Osman Montoya MD;  Location: [facility]     ESOPHAGOSCOPY, GASTROSCOPY, DUODENOSCOPY (EGD), COMBINED N/A 2023    Procedure: ESOPHAGOGASTRODUODENOSCOPY (EGD);  Surgeon: Leventhal, Thomas Michael, MD;  Location: [facility]     IR CVC NON TUNNEL LINE REMOVAL  3/10/2023     IR CVC TUNNEL PLACEMENT > 5 YRS OF AGE  3/10/2023     IR CVC TUNNEL REMOVAL RIGHT  2023     IR LIVER BIOPSY PERCUTANEOUS  2023     MIDLINE DOUBLE LUMEN PLACEMENT  2022          PICC TRIPLE LUMEN PLACEMENT  2022          RETURN LIVER TRANSPLANT N/A 3/1/2023    Procedure: RETURN TO OPERATING ROOM, AFTER LIVER TRANSPLANT;  Surgeon: Thelma Sterling MD;  Location: [facility]     TRANSPLANT LIVER RECIPIENT  DONOR N/A 2023    Procedure: Transplant liver recipient  donor;  Surgeon: Thelma Sterling MD;  Location: [facility]     Social History     Socioeconomic History     Marital status: Single     Spouse name: Not on file     Number of children: Not on file     Years of education: Not on file     Highest education level: Not on file   Occupational  History     Not on file   Tobacco Use     Smoking status: Former     Smokeless tobacco: Never     Tobacco comments:     A pack lasted a year, hardly smoked   Vaping Use     Vaping status: Former   Substance and Sexual Activity     Alcohol use: Not Currently     Comment: No ETOH since 20     Drug use: Not Currently     Types: Marijuana     Sexual activity: Not on file   Other Topics Concern     Not on file   Social History Narrative    26-year-old history of autism/Asperger's on the spectrum graduated high school at [employer] worked at  and then another  place until the Covid epidemic in 2020 lives with parents (Leticia and Wes) socially isolated drinks alcohol beer daily        End-stage cirrhosis noted on admission to  2022     Social Determinants of Health     Financial Resource Strain: Not on file   Food Insecurity: Not on file   Transportation Needs: Not on file   Physical Activity: Not on file   Stress: Not on file   Social Connections: Not on file   Intimate Partner Violence: Not on file   Housing Stability: Not on file     Prescription Medications as of 2023       Rx Number Disp Refills Start End Last Dispensed Date Next Fill Date Owning Pharmacy    acetaminophen (TYLENOL) 325 MG tablet  100 tablet 0 3/19/2023    [facility] - [place], [place] - [address]    Si tablets (650 mg) by Oral or Feeding Tube route every 8 hours as needed for mild pain or fever    Class: E-Prescribe    Route: Oral or Feeding Tube    Renewals     Renewal requests to authorizing provider (Julieth Leblanc NP) <b>prohibited</b>          aspirin (ASA) 81 MG EC tablet  30 tablet 0 2023    [facility] - [place], [place] - [address]    Sig: Take 1 tablet (81 mg) by mouth daily    Class: E-Prescribe    Notes to Pharmacy: Will purchase OTC.    Route: Oral    FLUoxetine (PROZAC) 20 MG capsule  90 capsule 0 2023        Sig: Take 3 capsules (60  mg) by mouth At Bedtime    Class: Historical    Route: Oral    insulin aspart (NOVOLOG FLEXPEN) 100 UNIT/ML pen  15 mL 3 2022    [id number] [address]  [facility] [address]    Si unit per 10 grams of carb, plus additional units based on following scale   For Pre-Meal  - 164 give 1 unit. For Pre-Meal  - 189 give 2 units. For Pre-Meal  - 214 give 3 units. For Pre-Meal  - 239 give 4 units. For Pre-Meal  - 264 give 5 units. For Pre-Meal  - 289 give 6 units. For Pre-Meal  - 314 give 7 units. For Pre-Meal  - 339 give 8 units. For Pre-Meal  - 364 give 9 units.  For Pre-Meal BG greater than or equal to 365 give 10 units    Class: No Print Out    insulin glargine (LANTUS PEN) 100 UNIT/ML pen  15 mL 0 3/19/2023    [facility] [address]    Sig: Inject 15 Units Subcutaneous every morning AND 10 Units At Bedtime.    Class: E-Prescribe    Notes to Pharmacy: If Lantus is not covered by insurance, may substitute Basaglar or Semglee or other insulin glargine product per insurance preference at same dose and frequency.      Route: Subcutaneous    Renewals     Renewal requests to authorizing provider (Julieth Leblanc NP) <b>prohibited</b>          magnesium oxide (MAG-OX) 400 MG tablet  120 tablet 3 2023    [facility] [place], [place] - [address] [place] [address]    Sig: Take 2 tablets (800 mg) by mouth 2 times daily    Class: E-Prescribe    Route: Oral    metoprolol tartrate (LOPRESSOR) 25 MG tablet  60 tablet 1 3/17/2023    [facility] [address]    Sig: Take 1 tablet (25 mg) by mouth 2 times daily    Class: E-Prescribe    Route: Oral    Renewals     Renewal requests to authorizing provider (Julieth Leblanc NP) <b>prohibited</b>          multivitamin w/minerals (THERA-VIT-M) tablet            Sig:  Take 1 tablet by mouth daily    Class: Historical    Route: Oral    mycophenolate (GENERIC EQUIVALENT) 250 MG capsule  540 capsule 3 3/28/2023    [facility] [place] [address]    Sig: Take 3 capsules (750 mg) by mouth 2 times daily    Class: E-Prescribe    Notes to Pharmacy: TXP DT 2/28/2023 (Liver) TXP Dischg DT 3/19/2023 DX Liver replaced by transplant Z94.4 [facility] ([place], [place])    Route: Oral    oxyCODONE (ROXICODONE) 5 MG tablet            Sig: Take 5 mg by mouth every 6 hours as needed for severe pain From PCP    Class: Historical    Route: Oral    pantoprazole (PROTONIX) 40 MG EC tablet  180 tablet 3 3/28/2023    [facility] [place] [address]    Sig: Take 1 tablet (40 mg) by mouth 2 times daily    Class: E-Prescribe    Route: Oral    predniSONE (DELTASONE) 5 MG tablet  90 tablet 1 3/28/2023    [facility] [place] [address]    Sig: Take 1 tablet (5 mg) by mouth daily    Class: E-Prescribe    Notes to Pharmacy: TXP DT 2/28/2023 (Liver) TXP Dischg DT 3/19/2023 DX Liver replaced by transplant Z94.4 [facility] ([place], [place])    Route: Oral    sulfamethoxazole-trimethoprim (BACTRIM) 400-80 MG tablet  36 tablet 3 4/26/2023    [facility] [place] [address]    Sig: Take 1 tablet by mouth three times a week Increase as instructed per transplant team    Class: E-Prescribe    Route: Oral    tacrolimus (GENERIC EQUIVALENT) 0.5 MG capsule  180 capsule 1 5/13/2023    [facility] [place] [address]    Sig: Take 1 capsule (0.5 mg) by mouth every morning Total dose: 3.5 mg in the AM and 3 mg in the PM    Class: E-Prescribe    Notes to Pharmacy: TXP DT 2/28/2023 (Liver) TXP Dischg DT 3/19/2023 DX Liver replaced by  transplant Z94.4 [facility] ([place], [place])    Route: Oral    tacrolimus (GENERIC EQUIVALENT) 1 MG capsule  360 capsule 3 2023    [facility] [place] [address]    Sig: Take 3 capsules (3 mg) by mouth every 12 hours Total dose: 3.5 mg in the AM, 3 mg in the PM    Class: E-Prescribe    Notes to Pharmacy: TXP DT 2023 (Liver) TXP Dischg DT 3/19/2023 DX Liver replaced by transplant Z94.4 [place] [facility] ([place], [place])    Route: Oral    ursodiol (ACTIGALL) 300 MG capsule  180 capsule 3 3/28/2023    [facility] [place] [address]    Sig: Take 1 capsule (300 mg) by mouth 2 times daily    Class: E-Prescribe    Route: Oral    valGANciclovir (VALCYTE) 450 MG tablet  30 tablet 2 3/30/2023    [facility] [place] [address]    Sig: Take 1 tablet (450 mg) by mouth twice a week Increase to 450mg daily when directed by transplant team with improving renal function    Class: E-Prescribe    Route: Oral    Vitamin D3 (CHOLECALCIFEROL) 25 mcg (1000 units) tablet  90 tablet 3 3/28/2023    [facility] [place] [address]    Si tablet (25 mcg) by Oral or Feeding Tube route daily    Class: E-Prescribe    Route: Oral or Feeding Tube        Patient has no known allergies.   REVIEW OF SYSTEMS (check box if normal)  [x]               GENERAL  [x]                 PULMONARY [x]                GENITOURINARY  [x]                CNS                 [x]                 CARDIAC  [x]                 ENDOCRINE  [x]                EARS,NOSE,THROAT [x]                 GASTROINTESTINAL [x]                 NEUROLOGIC    [x]                MUSCLOSKELTAL  [x]                  HEMATOLOGY      PHYSICAL EXAM (check box if normal)There were no vitals taken for this visit.        [x]             GENERAL:    [x]       EYES:  ICTERIC   []        YES  []                    NO  [x]           EXTREMITIES: Clubbing []       Y     [x]           N    [x]           EARS, NOSE, THROAT: Membranes Moist    YES   [x]                   NO []                  [x]           LUNGS:  CLEAR    YES       [x]                  NO    []                                [x]           SKIN: Jaundice           YES       []                  NO    [x]                   Rash: YES       []                  NO    [x]                                     [x]             HEART: Regular Rate          YES       [x]                  NO    []                   Incision Clean:  YES       [x]                  NO    []                                [x]                    ABDOMEN: Organomegaly YES       []                  NO    [x]                       [x]                    NEUROLOGICAL:  Nonfocal  YES       [x]                  NO    []                       [x]                    Hernia YES       []                  NO    [x]                   PSYCHIATRIC:  Appropriate  YES       [x]                  NO    []                       OTHER:                                                                                                   PAIN SCALE:: 1

## 2023-05-23 ENCOUNTER — LAB REQUISITION (OUTPATIENT)
Dept: LAB | Facility: CLINIC | Age: 28
End: 2023-05-23
Payer: COMMERCIAL

## 2023-05-23 ENCOUNTER — TELEPHONE (OUTPATIENT)
Dept: TRANSPLANT | Facility: CLINIC | Age: 28
End: 2023-05-23

## 2023-05-23 DIAGNOSIS — Z94.4 LIVER TRANSPLANT STATUS (H): ICD-10-CM

## 2023-05-23 LAB
ALBUMIN SERPL-MCNC: 3.4 G/DL (ref 3.5–5)
ALP SERPL-CCNC: 1186 U/L (ref 45–120)
ALT SERPL W P-5'-P-CCNC: 82 U/L (ref 0–45)
ANION GAP SERPL CALCULATED.3IONS-SCNC: 8 MMOL/L (ref 5–18)
AST SERPL W P-5'-P-CCNC: 62 U/L (ref 0–40)
BILIRUB DIRECT SERPL-MCNC: 1.2 MG/DL
BILIRUB SERPL-MCNC: 2.2 MG/DL (ref 0–1)
BUN SERPL-MCNC: 25 MG/DL (ref 8–22)
CALCIUM SERPL-MCNC: 9.9 MG/DL (ref 8.5–10.5)
CHLORIDE BLD-SCNC: 94 MMOL/L (ref 98–107)
CO2 SERPL-SCNC: 31 MMOL/L (ref 22–31)
CREAT SERPL-MCNC: 0.85 MG/DL (ref 0.7–1.3)
GFR SERPL CREATININE-BSD FRML MDRD: >90 ML/MIN/1.73M2
GLUCOSE BLD-MCNC: 70 MG/DL (ref 70–125)
HOLD SPECIMEN: NORMAL
MAGNESIUM SERPL-MCNC: 1.4 MG/DL (ref 1.8–2.6)
POTASSIUM BLD-SCNC: 5.1 MMOL/L (ref 3.5–5)
PROT SERPL-MCNC: 7 G/DL (ref 6–8)
SODIUM SERPL-SCNC: 133 MMOL/L (ref 136–145)

## 2023-05-23 PROCEDURE — 84155 ASSAY OF PROTEIN SERUM: CPT | Mod: ORL | Performed by: STUDENT IN AN ORGANIZED HEALTH CARE EDUCATION/TRAINING PROGRAM

## 2023-05-23 PROCEDURE — 80321 ALCOHOLS BIOMARKERS 1OR 2: CPT | Mod: ORL | Performed by: STUDENT IN AN ORGANIZED HEALTH CARE EDUCATION/TRAINING PROGRAM

## 2023-05-23 PROCEDURE — 83735 ASSAY OF MAGNESIUM: CPT | Mod: ORL | Performed by: STUDENT IN AN ORGANIZED HEALTH CARE EDUCATION/TRAINING PROGRAM

## 2023-05-23 PROCEDURE — 80048 BASIC METABOLIC PNL TOTAL CA: CPT | Mod: ORL | Performed by: STUDENT IN AN ORGANIZED HEALTH CARE EDUCATION/TRAINING PROGRAM

## 2023-05-23 PROCEDURE — 36415 COLL VENOUS BLD VENIPUNCTURE: CPT | Mod: ORL | Performed by: STUDENT IN AN ORGANIZED HEALTH CARE EDUCATION/TRAINING PROGRAM

## 2023-05-23 PROCEDURE — 82248 BILIRUBIN DIRECT: CPT | Mod: ORL | Performed by: STUDENT IN AN ORGANIZED HEALTH CARE EDUCATION/TRAINING PROGRAM

## 2023-05-23 NOTE — TELEPHONE ENCOUNTER
Alk phos today [id number]. Dr. Sterling and Vicky Peres NP notified of increase. Again suggested that if he wants ERCP sooner than 6/12, Dillon will need to be admitted. Dr. Sterling and Vicky both agreed to admit Dillon. Bed on 7A requested, unsure of when one will be available. ERCP coordinator sent message that there was a cancellation and they will get him scheduled for 5/31. Sent another email to Dr. Sterling asking if this date would be alright or if he would like to proceed with admission.   Call to Leticia to review Hallie's plan. Told Leticia to take Dillon to ED if he develops a fever. Awaiting Dr. Sterling's response regarding waiting for ERCP until 5/31. Will update Leticia tomorrow with further plan.

## 2023-05-23 NOTE — PROGRESS NOTES
Per transplant service, Dr. Sterling would prioritize ERCP over MRCP so no need to wait for imaging appointment. Called Leticia to discuss earlier date for procedure. She is agreeable to schedule for 5/31.     Pre-op: 5/16 appointment with Dr. Rodrigues.     Marilu Levine, RN Care Coordinator

## 2023-05-24 ENCOUNTER — HOSPITAL ENCOUNTER (OUTPATIENT)
Facility: CLINIC | Age: 28
End: 2023-05-24
Attending: INTERNAL MEDICINE | Admitting: INTERNAL MEDICINE
Payer: COMMERCIAL

## 2023-05-24 ENCOUNTER — PREP FOR PROCEDURE (OUTPATIENT)
Dept: GASTROENTEROLOGY | Facility: CLINIC | Age: 28
End: 2023-05-24
Payer: COMMERCIAL

## 2023-05-24 ENCOUNTER — HOSPITAL ENCOUNTER (OUTPATIENT)
Facility: CLINIC | Age: 28
Discharge: HOME | End: 2023-05-25
Attending: INTERNAL MEDICINE | Admitting: INTERNAL MEDICINE
Payer: COMMERCIAL

## 2023-05-24 ENCOUNTER — ANESTHESIA EVENT (OUTPATIENT)
Dept: SURGERY | Facility: CLINIC | Age: 28
End: 2023-05-24
Payer: COMMERCIAL

## 2023-05-24 ENCOUNTER — PREP FOR PROCEDURE (OUTPATIENT)
Dept: GASTROENTEROLOGY | Facility: CLINIC | Age: 28
End: 2023-05-24

## 2023-05-24 ENCOUNTER — APPOINTMENT (OUTPATIENT)
Dept: GENERAL RADIOLOGY | Facility: CLINIC | Age: 28
End: 2023-05-24
Attending: INTERNAL MEDICINE
Payer: COMMERCIAL

## 2023-05-24 ENCOUNTER — ANESTHESIA (OUTPATIENT)
Dept: SURGERY | Facility: CLINIC | Age: 28
End: 2023-05-24
Payer: COMMERCIAL

## 2023-05-24 VITALS
WEIGHT: 147.49 LBS | SYSTOLIC BLOOD PRESSURE: 130 MMHG | TEMPERATURE: 97.7 F | BODY MASS INDEX: 24.57 KG/M2 | HEIGHT: 65 IN | OXYGEN SATURATION: 98 % | RESPIRATION RATE: 12 BRPM | DIASTOLIC BLOOD PRESSURE: 86 MMHG | HEART RATE: 63 BPM

## 2023-05-24 DIAGNOSIS — T86.49 BILIARY STRICTURE OF TRANSPLANTED LIVER (H): Primary | ICD-10-CM

## 2023-05-24 DIAGNOSIS — K83.1 BILIARY STRICTURE OF TRANSPLANTED LIVER (H): Primary | ICD-10-CM

## 2023-05-24 DIAGNOSIS — Z94.4 LIVER REPLACED BY TRANSPLANT (H): Primary | ICD-10-CM

## 2023-05-24 LAB
AMYLASE SERPL-CCNC: 87 U/L (ref 28–100)
ERCP: NORMAL
GLUCOSE BLDC GLUCOMTR-MCNC: 163 MG/DL (ref 70–99)
GLUCOSE BLDC GLUCOMTR-MCNC: 89 MG/DL (ref 70–99)
LIPASE SERPL-CCNC: 82 U/L (ref 13–60)

## 2023-05-24 PROCEDURE — 999N000141 HC STATISTIC PRE-PROCEDURE NURSING ASSESSMENT: Performed by: INTERNAL MEDICINE

## 2023-05-24 PROCEDURE — 250N000011 HC RX IP 250 OP 636: Performed by: NURSE ANESTHETIST, CERTIFIED REGISTERED

## 2023-05-24 PROCEDURE — 255N000002 HC RX 255 OP 636: Performed by: INTERNAL MEDICINE

## 2023-05-24 PROCEDURE — 258N000003 HC RX IP 258 OP 636: Performed by: INTERNAL MEDICINE

## 2023-05-24 PROCEDURE — C1769 GUIDE WIRE: HCPCS | Performed by: INTERNAL MEDICINE

## 2023-05-24 PROCEDURE — 82150 ASSAY OF AMYLASE: CPT | Performed by: INTERNAL MEDICINE

## 2023-05-24 PROCEDURE — 272N000001 HC OR GENERAL SUPPLY STERILE: Performed by: INTERNAL MEDICINE

## 2023-05-24 PROCEDURE — 250N000011 HC RX IP 250 OP 636

## 2023-05-24 PROCEDURE — 710N000010 HC RECOVERY PHASE 1, LEVEL 2, PER MIN: Performed by: INTERNAL MEDICINE

## 2023-05-24 PROCEDURE — 250N000025 HC SEVOFLURANE, PER MIN: Performed by: INTERNAL MEDICINE

## 2023-05-24 PROCEDURE — 83690 ASSAY OF LIPASE: CPT | Performed by: INTERNAL MEDICINE

## 2023-05-24 PROCEDURE — 36415 COLL VENOUS BLD VENIPUNCTURE: CPT | Performed by: INTERNAL MEDICINE

## 2023-05-24 PROCEDURE — C2617 STENT, NON-COR, TEM W/O DEL: HCPCS | Performed by: INTERNAL MEDICINE

## 2023-05-24 PROCEDURE — 250N000009 HC RX 250: Performed by: NURSE ANESTHETIST, CERTIFIED REGISTERED

## 2023-05-24 PROCEDURE — 370N000017 HC ANESTHESIA TECHNICAL FEE, PER MIN: Performed by: INTERNAL MEDICINE

## 2023-05-24 PROCEDURE — 999N000179 XR SURGERY CARM FLUORO LESS THAN 5 MIN W STILLS: Mod: TC

## 2023-05-24 PROCEDURE — 360N000083 HC SURGERY LEVEL 3 W/ FLUORO, PER MIN: Performed by: INTERNAL MEDICINE

## 2023-05-24 PROCEDURE — 258N000003 HC RX IP 258 OP 636: Performed by: NURSE ANESTHETIST, CERTIFIED REGISTERED

## 2023-05-24 PROCEDURE — 250N000009 HC RX 250: Performed by: INTERNAL MEDICINE

## 2023-05-24 PROCEDURE — C1726 CATH, BAL DIL, NON-VASCULAR: HCPCS | Performed by: INTERNAL MEDICINE

## 2023-05-24 DEVICE — IMPLANTABLE DEVICE
Type: IMPLANTABLE DEVICE | Site: BILE DUCT | Status: NON-FUNCTIONAL
Removed: 2023-07-24

## 2023-05-24 RX ORDER — ONDANSETRON 2 MG/ML
INJECTION INTRAMUSCULAR; INTRAVENOUS PRN
Status: DISCONTINUED | OUTPATIENT
Start: 2023-05-24 | End: 2023-05-24

## 2023-05-24 RX ORDER — HYDRALAZINE HYDROCHLORIDE 20 MG/ML
10-20 INJECTION INTRAMUSCULAR; INTRAVENOUS EVERY 4 HOURS PRN
Status: DISCONTINUED | OUTPATIENT
Start: 2023-05-24 | End: 2023-05-25 | Stop reason: HOSPADM

## 2023-05-24 RX ORDER — GABAPENTIN 100 MG/1
100 CAPSULE ORAL
COMMUNITY
Start: 2023-05-01

## 2023-05-24 RX ORDER — ONDANSETRON 2 MG/ML
4 INJECTION INTRAMUSCULAR; INTRAVENOUS EVERY 6 HOURS PRN
Status: DISCONTINUED | OUTPATIENT
Start: 2023-05-24 | End: 2023-05-25 | Stop reason: HOSPADM

## 2023-05-24 RX ORDER — PROPOFOL 10 MG/ML
INJECTION, EMULSION INTRAVENOUS PRN
Status: DISCONTINUED | OUTPATIENT
Start: 2023-05-24 | End: 2023-05-24

## 2023-05-24 RX ORDER — LABETALOL HYDROCHLORIDE 5 MG/ML
20 INJECTION, SOLUTION INTRAVENOUS EVERY 4 HOURS PRN
Status: DISCONTINUED | OUTPATIENT
Start: 2023-05-24 | End: 2023-05-25 | Stop reason: HOSPADM

## 2023-05-24 RX ORDER — ONDANSETRON 4 MG/1
4 TABLET, ORALLY DISINTEGRATING ORAL EVERY 6 HOURS PRN
Status: DISCONTINUED | OUTPATIENT
Start: 2023-05-24 | End: 2023-05-25 | Stop reason: HOSPADM

## 2023-05-24 RX ORDER — SODIUM CHLORIDE, SODIUM LACTATE, POTASSIUM CHLORIDE, CALCIUM CHLORIDE 600; 310; 30; 20 MG/100ML; MG/100ML; MG/100ML; MG/100ML
INJECTION, SOLUTION INTRAVENOUS CONTINUOUS
Status: DISCONTINUED | OUTPATIENT
Start: 2023-05-24 | End: 2023-05-24 | Stop reason: HOSPADM

## 2023-05-24 RX ORDER — CIPROFLOXACIN 500 MG/1
500 TABLET, FILM COATED ORAL 2 TIMES DAILY
Qty: 6 TABLET | Refills: 0 | Status: SHIPPED | OUTPATIENT
Start: 2023-05-24 | End: 2023-06-02

## 2023-05-24 RX ORDER — IOPAMIDOL 510 MG/ML
INJECTION, SOLUTION INTRAVASCULAR PRN
Status: DISCONTINUED | OUTPATIENT
Start: 2023-05-24 | End: 2023-05-24 | Stop reason: HOSPADM

## 2023-05-24 RX ORDER — FENTANYL CITRATE 50 UG/ML
25 INJECTION, SOLUTION INTRAMUSCULAR; INTRAVENOUS EVERY 5 MIN PRN
Status: DISCONTINUED | OUTPATIENT
Start: 2023-05-24 | End: 2023-05-24 | Stop reason: HOSPADM

## 2023-05-24 RX ORDER — LIDOCAINE 40 MG/G
CREAM TOPICAL
Status: CANCELLED | OUTPATIENT
Start: 2023-05-24

## 2023-05-24 RX ORDER — NALOXONE HYDROCHLORIDE 0.4 MG/ML
0.2 INJECTION, SOLUTION INTRAMUSCULAR; INTRAVENOUS; SUBCUTANEOUS
Status: DISCONTINUED | OUTPATIENT
Start: 2023-05-24 | End: 2023-05-25 | Stop reason: HOSPADM

## 2023-05-24 RX ORDER — ONDANSETRON 2 MG/ML
4 INJECTION INTRAMUSCULAR; INTRAVENOUS EVERY 30 MIN PRN
Status: DISCONTINUED | OUTPATIENT
Start: 2023-05-24 | End: 2023-05-24

## 2023-05-24 RX ORDER — ONDANSETRON 2 MG/ML
4 INJECTION INTRAMUSCULAR; INTRAVENOUS EVERY 30 MIN PRN
Status: DISCONTINUED | OUTPATIENT
Start: 2023-05-24 | End: 2023-05-24 | Stop reason: HOSPADM

## 2023-05-24 RX ORDER — HYDRALAZINE HYDROCHLORIDE 20 MG/ML
10-20 INJECTION INTRAMUSCULAR; INTRAVENOUS EVERY 6 HOURS PRN
Status: DISCONTINUED | OUTPATIENT
Start: 2023-05-24 | End: 2023-05-24

## 2023-05-24 RX ORDER — INDOMETHACIN 50 MG/1
SUPPOSITORY RECTAL PRN
Status: DISCONTINUED | OUTPATIENT
Start: 2023-05-24 | End: 2023-05-24 | Stop reason: HOSPADM

## 2023-05-24 RX ORDER — LIDOCAINE 40 MG/G
CREAM TOPICAL
Status: DISCONTINUED | OUTPATIENT
Start: 2023-05-24 | End: 2023-05-24 | Stop reason: HOSPADM

## 2023-05-24 RX ORDER — SODIUM CHLORIDE, SODIUM LACTATE, POTASSIUM CHLORIDE, CALCIUM CHLORIDE 600; 310; 30; 20 MG/100ML; MG/100ML; MG/100ML; MG/100ML
INJECTION, SOLUTION INTRAVENOUS CONTINUOUS PRN
Status: DISCONTINUED | OUTPATIENT
Start: 2023-05-24 | End: 2023-05-24

## 2023-05-24 RX ORDER — NALOXONE HYDROCHLORIDE 0.4 MG/ML
0.4 INJECTION, SOLUTION INTRAMUSCULAR; INTRAVENOUS; SUBCUTANEOUS
Status: DISCONTINUED | OUTPATIENT
Start: 2023-05-24 | End: 2023-05-25 | Stop reason: HOSPADM

## 2023-05-24 RX ORDER — LIDOCAINE HYDROCHLORIDE 20 MG/ML
INJECTION, SOLUTION INFILTRATION; PERINEURAL PRN
Status: DISCONTINUED | OUTPATIENT
Start: 2023-05-24 | End: 2023-05-24

## 2023-05-24 RX ORDER — ONDANSETRON 4 MG/1
4 TABLET, ORALLY DISINTEGRATING ORAL EVERY 30 MIN PRN
Status: DISCONTINUED | OUTPATIENT
Start: 2023-05-24 | End: 2023-05-24

## 2023-05-24 RX ORDER — FLUMAZENIL 0.1 MG/ML
0.2 INJECTION, SOLUTION INTRAVENOUS
Status: DISCONTINUED | OUTPATIENT
Start: 2023-05-24 | End: 2023-05-25 | Stop reason: HOSPADM

## 2023-05-24 RX ORDER — ONDANSETRON 4 MG/1
4 TABLET, ORALLY DISINTEGRATING ORAL EVERY 30 MIN PRN
Status: DISCONTINUED | OUTPATIENT
Start: 2023-05-24 | End: 2023-05-24 | Stop reason: HOSPADM

## 2023-05-24 RX ORDER — FENTANYL CITRATE 50 UG/ML
50 INJECTION, SOLUTION INTRAMUSCULAR; INTRAVENOUS EVERY 5 MIN PRN
Status: DISCONTINUED | OUTPATIENT
Start: 2023-05-24 | End: 2023-05-24 | Stop reason: HOSPADM

## 2023-05-24 RX ORDER — ONDANSETRON 4 MG/1
4 TABLET, ORALLY DISINTEGRATING ORAL EVERY 6 HOURS PRN
Status: CANCELLED | OUTPATIENT
Start: 2023-05-24

## 2023-05-24 RX ORDER — DEXAMETHASONE SODIUM PHOSPHATE 4 MG/ML
INJECTION, SOLUTION INTRA-ARTICULAR; INTRALESIONAL; INTRAMUSCULAR; INTRAVENOUS; SOFT TISSUE PRN
Status: DISCONTINUED | OUTPATIENT
Start: 2023-05-24 | End: 2023-05-24

## 2023-05-24 RX ORDER — FENTANYL CITRATE 50 UG/ML
INJECTION, SOLUTION INTRAMUSCULAR; INTRAVENOUS PRN
Status: DISCONTINUED | OUTPATIENT
Start: 2023-05-24 | End: 2023-05-24

## 2023-05-24 RX ORDER — ERTAPENEM 1 G/1
INJECTION, POWDER, LYOPHILIZED, FOR SOLUTION INTRAMUSCULAR; INTRAVENOUS PRN
Status: DISCONTINUED | OUTPATIENT
Start: 2023-05-24 | End: 2023-05-24

## 2023-05-24 RX ORDER — ONDANSETRON 2 MG/ML
4 INJECTION INTRAMUSCULAR; INTRAVENOUS EVERY 6 HOURS PRN
Status: CANCELLED | OUTPATIENT
Start: 2023-05-24

## 2023-05-24 RX ADMIN — FENTANYL CITRATE 50 MCG: 50 INJECTION, SOLUTION INTRAMUSCULAR; INTRAVENOUS at 20:26

## 2023-05-24 RX ADMIN — LIDOCAINE HYDROCHLORIDE 80 MG: 20 INJECTION, SOLUTION INFILTRATION; PERINEURAL at 19:58

## 2023-05-24 RX ADMIN — MIDAZOLAM 1 MG: 1 INJECTION INTRAMUSCULAR; INTRAVENOUS at 19:47

## 2023-05-24 RX ADMIN — Medication 50 MG: at 19:56

## 2023-05-24 RX ADMIN — SUGAMMADEX 200 MG: 100 INJECTION, SOLUTION INTRAVENOUS at 20:39

## 2023-05-24 RX ADMIN — PROPOFOL 200 MG: 10 INJECTION, EMULSION INTRAVENOUS at 19:55

## 2023-05-24 RX ADMIN — HYDRALAZINE HYDROCHLORIDE 20 MG: 20 INJECTION INTRAMUSCULAR; INTRAVENOUS at 22:42

## 2023-05-24 RX ADMIN — MIDAZOLAM 1 MG: 1 INJECTION INTRAMUSCULAR; INTRAVENOUS at 19:39

## 2023-05-24 RX ADMIN — DEXAMETHASONE SODIUM PHOSPHATE 6 MG: 4 INJECTION, SOLUTION INTRA-ARTICULAR; INTRALESIONAL; INTRAMUSCULAR; INTRAVENOUS; SOFT TISSUE at 20:14

## 2023-05-24 RX ADMIN — ONDANSETRON 4 MG: 2 INJECTION INTRAMUSCULAR; INTRAVENOUS at 20:37

## 2023-05-24 RX ADMIN — FENTANYL CITRATE 100 MCG: 50 INJECTION, SOLUTION INTRAMUSCULAR; INTRAVENOUS at 19:58

## 2023-05-24 RX ADMIN — ERTAPENEM SODIUM 1 G: 1 INJECTION, POWDER, LYOPHILIZED, FOR SOLUTION INTRAMUSCULAR; INTRAVENOUS at 20:02

## 2023-05-24 RX ADMIN — SODIUM CHLORIDE, POTASSIUM CHLORIDE, SODIUM LACTATE AND CALCIUM CHLORIDE 1000 ML: 600; 310; 30; 20 INJECTION, SOLUTION INTRAVENOUS at 21:00

## 2023-05-24 RX ADMIN — SODIUM CHLORIDE, POTASSIUM CHLORIDE, SODIUM LACTATE AND CALCIUM CHLORIDE: 600; 310; 30; 20 INJECTION, SOLUTION INTRAVENOUS at 19:39

## 2023-05-24 ASSESSMENT — ACTIVITIES OF DAILY LIVING (ADL)
ADLS_ACUITY_SCORE: 35

## 2023-05-24 NOTE — TELEPHONE ENCOUNTER
Dr. Sterling spoke to 7A charge RN Clementina this morning about getting Dillon directly admitted. JUAN PABLO is able to admit him around 4pm today, Clementina is not sure when ERCP will happen so wants Dillon to be NPO for now until he gets admitted and they can figure out the plan.  Call to Leticia regarding direct admission timing. She stated they can be there around 5pm. Informed Leticia that Dillon is to be NPO for now, she will make sure he has nothing to eat or drink now. Leticia asked if she would be able to stay overnight with Dillon, assured her that it would be fine as visitor restrictions have loosened.   Called Clementina back to inform her that Dillon will be there around 5pm, she said that was fine. Also made sure that it was ok for Leticia to stay with Dillon over night, Clementina said it was fine.

## 2023-05-24 NOTE — H&P
History and Physical  Transplant Surgery     Date of Admission:  5/24/23    Assessment & Plan   Dillon Salter is a 27 year old male with history of EtOH induced cirrhosis s/p liver transplant 2/28/23, HTN, insulin dependent DM2, autism spectrum, MDD/anxiety, and chronic neuropathic pain. US 5/17 patent doppler exam. Biopsy 5/18 was negative for rejection, concerning for obstruction. Presents with elevated alk phos 1186 concerning for biliary obstruction or stenosis.     Transplant:   Liver transplant, concern for biliary obstruction: Alk phos 1186 on 5/23. Continue ursodiol and ASA for HAT ppx. Consult GI for ERCP.     Immunosuppression management:   -Tac goal 8-10  -MMF 750mg BID     Neuro:   Hx MDD/anxiety: Continue fluoxetine.     Hematology:   Anemia of chronic disease: Check CBC.     Cardiorespiratory:   Hx HTN: Continue metoprolol.     GI/Nutrition:   Diet: NPO for possible ERCP.     Endocrine:   DM type 2: Continue lantus, sliding scale insulin, carb coverage.     Fluid/Electrolytes:   Hx hypomagnesemia: Continue MagOx.     : No acute issues.      Infectious disease: Afebrile     Prophylaxis: TMP/sulfa, valganciclovir (CMV D-/R-)     Disposition: 7A    Martha Mcnulty M.D.  General Surgery Resident PGY1  [facility]    Chief Complaint   Elevated alk phos in transplant recipient    History of Present Illness   Dillon Salter is a 27 year old male with history of EtOH induced cirrhosis s/p liver transplant 2/28/23, HTN, insulin dependent DM2, autism spectrum, MDD/anxiety, and chronic neuropathic pain. US 5/17 patent doppler exam. Biopsy 5/18 was negative for rejection, concerning for obstruction. Presents with elevated alk phos 1186 concerning for biliary obstruction or stenosis. Review of systems positive for pruritus.      Past Medical History    I have reviewed this patient's medical history and updated it with pertinent information if needed.   Past Medical History:    Diagnosis Date     Acute on chronic anemia 04/08/2022     Alcohol use disorder      Alcohol use disorder, severe, dependence (H) 07/11/2022     Alcoholic cirrhosis of liver with ascites (H)      Alcoholic hepatitis      Autism spectrum disorder 04/08/2022    High functioning autistic.  26-year-old history of autism/Asperger's on the spectrum graduated high school at [employer] worked at [employer] and then another  place until the Covid epidemic in 2020 lives with parents (Leticia and Wes) socially isolated drinks alcohol beer daily      Benign essential hypertension      Bleeding esophageal varices (H) 06/18/2022     Hepatic encephalopathy (H)      Hyponatremia 07/28/2022     Major depressive disorder      Neuropathic pain      Status post liver transplantation (H) 02/28/2023     Type II Diabetes 2012       Past Surgical History   I have reviewed this patient's surgical history and updated it with pertinent information if needed.  Past Surgical History:   Procedure Laterality Date     BENCH LIVER  2/28/2023    Procedure: Bench liver;  Surgeon: Thelma Sterling MD;  [facility]: [facility]     COLONOSCOPY N/A 1/27/2023    Procedure: Colonoscopy;  Surgeon: Osman Montoya MD;  Location: [facility]     ENDOSCOPIC ULTRASOUND LOWER GASTROINTESTIONAL TRACT (GI) N/A 1/27/2023    Procedure: ULTRASOUND, LOWER GI TRACT, ENDOSCOPIC with glueing;  Surgeon: Osman Montoya MD;  Location: [facility] OR     ESOPHAGOSCOPY, GASTROSCOPY, DUODENOSCOPY (EGD), COMBINED N/A 5/24/2022    Procedure: ESOPHAGOGASTRODUODENOSCOPY (EGD) with esophageal banding;  Surgeon: Gary Hurst MD;  Location: [facility] Main OR     ESOPHAGOSCOPY, GASTROSCOPY, DUODENOSCOPY (EGD), COMBINED N/A 6/20/2022    Procedure: ESOPHAGOGASTRODUODENOSCOPY;  Surgeon: Gavino Reza MD;  Location[facility] Main OR     ESOPHAGOSCOPY, GASTROSCOPY, DUODENOSCOPY (EGD), COMBINED N/A 1/23/2023    Procedure: ESOPHAGOGASTRODUODENOSCOPY (EGD) with esophageal variceal banding;   Surgeon: Juan Boo MD;  Location: [facility] Main OR     ESOPHAGOSCOPY, GASTROSCOPY, DUODENOSCOPY (EGD), COMBINED N/A 2023    Procedure: ESOPHAGOGASTRODUODENOSCOPY (EGD);  Surgeon: Juan Boo MD;  Location: [facility] Main OR     ESOPHAGOSCOPY, GASTROSCOPY, DUODENOSCOPY (EGD), COMBINED N/A 2023    Procedure: Esophagoscopy, gastroscopy, duodenoscopy (EGD), combined;  Surgeon: Osman Montoya MD;  Location: [facility] OR     ESOPHAGOSCOPY, GASTROSCOPY, DUODENOSCOPY (EGD), COMBINED N/A 2023    Procedure: ESOPHAGOGASTRODUODENOSCOPY (EGD);  Surgeon: Leventhal, Thomas Michael, MD;  Location: [facility] OR     IR CVC NON TUNNEL LINE REMOVAL  3/10/2023     IR CVC TUNNEL PLACEMENT > 5 YRS OF AGE  3/10/2023     IR CVC TUNNEL REMOVAL RIGHT  2023     IR LIVER BIOPSY PERCUTANEOUS  2023     MIDLINE DOUBLE LUMEN PLACEMENT  2022          PICC TRIPLE LUMEN PLACEMENT  2022          RETURN LIVER TRANSPLANT N/A 3/1/2023    Procedure: RETURN TO OPERATING ROOM, AFTER LIVER TRANSPLANT;  Surgeon: Thelma Sterling MD;  Location: [facility] OR     TRANSPLANT LIVER RECIPIENT  DONOR N/A 2023    Procedure: Transplant liver recipient  donor;  Surgeon: Thelma Sterling MD;  Location: [facility] OR       Prior to Admission Medications   Prior to Admission Medications   Prescriptions Last Dose Informant Patient Reported? Taking?   FLUoxetine (PROZAC) 20 MG capsule 2023 at 1300  Yes Yes   Sig: Take 3 capsules (60 mg) by mouth At Bedtime   Vitamin D3 (CHOLECALCIFEROL) 25 mcg (1000 units) tablet 2023 at 0800  No Yes   Si tablet (25 mcg) by Oral or Feeding Tube route daily   acetaminophen (TYLENOL) 325 MG tablet 2023 at 1700  No Yes   Si tablets (650 mg) by Oral or Feeding Tube route every 8 hours as needed for mild pain or fever   aspirin (ASA) 81 MG EC tablet 2023 at 1200  No Yes   Sig: Take 1 tablet (81 mg) by mouth daily   gabapentin (NEURONTIN) 100 MG capsule Unknown  Yes Yes    Sig: Take 100 mg by mouth   insulin aspart (NOVOLOG FLEXPEN) 100 UNIT/ML pen More than a month  No Yes   Si unit per 10 grams of carb, plus additional units based on following scale   For Pre-Meal  - 164 give 1 unit. For Pre-Meal  - 189 give 2 units. For Pre-Meal  - 214 give 3 units. For Pre-Meal  - 239 give 4 units. For Pre-Meal  - 264 give 5 units. For Pre-Meal  - 289 give 6 units. For Pre-Meal  - 314 give 7 units. For Pre-Meal  - 339 give 8 units. For Pre-Meal  - 364 give 9 units.  For Pre-Meal BG greater than or equal to 365 give 10 units   insulin glargine (LANTUS PEN) 100 UNIT/ML pen 2023 at 1800  No Yes   Sig: Inject 15 Units Subcutaneous every morning AND 10 Units At Bedtime.   Patient taking differently: Inject 1 Units daily   magnesium oxide (MAG-OX) 400 MG tablet 2023 at 1800  No Yes   Sig: Take 2 tablets (800 mg) by mouth 2 times daily   metoprolol tartrate (LOPRESSOR) 25 MG tablet 2023 at 10836  No Yes   Sig: Take 1 tablet (25 mg) by mouth 2 times daily   multivitamin w/minerals (THERA-VIT-M) tablet 2023 at 1300  Yes Yes   Sig: Take 1 tablet by mouth daily   mycophenolate (GENERIC EQUIVALENT) 250 MG capsule 2023 at 1300  No Yes   Sig: Take 3 capsules (750 mg) by mouth 2 times daily   oxyCODONE (ROXICODONE) 5 MG tablet 2023 at 1800  Yes Yes   Sig: Take 5 mg by mouth every 6 hours as needed for severe pain From PCP   pantoprazole (PROTONIX) 40 MG EC tablet 2023 at 1300  No Yes   Sig: Take 1 tablet (40 mg) by mouth 2 times daily   Patient taking differently: Take 40 mg by mouth daily   predniSONE (DELTASONE) 5 MG tablet 2023 at 1200  No Yes   Sig: Take 1 tablet (5 mg) by mouth daily   sulfamethoxazole-trimethoprim (BACTRIM) 400-80 MG tablet 2023 at 0800  No Yes   Sig: Take 1 tablet by mouth three times a week Increase as instructed per transplant team   tacrolimus (GENERIC EQUIVALENT) 0.5 MG capsule  5/24/2023 at 0800  No Yes   Sig: Take 1 capsule (0.5 mg) by mouth every morning Total dose: 3.5 mg in the AM and 3 mg in the PM   tacrolimus (GENERIC EQUIVALENT) 1 MG capsule 5/24/2023 at 1300  No Yes   Sig: Take 3 capsules (3 mg) by mouth every 12 hours Total dose: 3.5 mg in the AM, 3 mg in the PM   ursodiol (ACTIGALL) 300 MG capsule 5/24/2023 at 1300  No Yes   Sig: Take 1 capsule (300 mg) by mouth 2 times daily   valGANciclovir (VALCYTE) 450 MG tablet 5/23/2023 at 0800  No Yes   Sig: Take 1 tablet (450 mg) by mouth twice a week Increase to 450mg daily when directed by transplant team with improving renal function   Patient taking differently: Take 450 mg by mouth daily Increase to 450mg daily when directed by transplant team with improving renal function      Facility-Administered Medications: None     Allergies   No Known Allergies    Review of Systems   The 10 point Review of Systems is negative other than noted in the HPI or here.     Physical Exam   Temp: 97.7  F (36.5  C) Temp src: Oral BP: 130/86 Pulse: 63   Resp: 12 SpO2: 98 % O2 Device: None (Room air) Oxygen Delivery: 4 LPM  Vital Signs with Ranges  Temp:  [97.4  F (36.3  C)-97.7  F (36.5  C)] 97.7  F (36.5  C)  Pulse:  [61-73] 63  Resp:  [11-20] 12  BP: (130-189)/() 130/86  SpO2:  [93 %-100 %] 98 %  147 lbs 7.8 oz    Constitutional: Adult male supine in bed, alert and interactive, NAD.  Eyes: PERRL, EOMI.  ENT: Mucosa moist.  Respiratory: Lungs CTAB, no increased work of breathing on room air.  Cardiovascular: Regular rate, no murmur appreciated.  Abd: Soft, nondistended, minimally tender. No guarding or rebound tenderness. Well-healed chevron incision.  Skin: Light brown spots and dappled appearance to abdominal skin.  Musculoskeletal: Warm and well perfused, no BLE edema.  Neurologic: A&O x4.  Neuropsychiatric: Appropriate affect.    I have reviewed history, examined patient and discussed plan with the fellow/resident/CHRISTA.    I concur with the  findings in this note.    Time spent on admission activities: 45 minutes.

## 2023-05-24 NOTE — ANESTHESIA PREPROCEDURE EVALUATION
Anesthesia Pre-Procedure Evaluation    Patient: Dillon Salter   MRN: [id number] : 1995        Procedure : Procedure(s):  Return liver transplant          Past Medical History:   Diagnosis Date     Acute on chronic anemia 2022     Alcohol use disorder      Alcohol use disorder, severe, dependence (H) 2022     Alcoholic cirrhosis of liver with ascites (H)      Alcoholic hepatitis      Autism spectrum disorder 2022    High functioning autistic.  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  lives with parents (Leticia and Wes) socially isolated drinks alcohol beer daily      Benign essential hypertension      Bleeding esophageal varices (H) 2022     Hepatic encephalopathy (H)      Hyponatremia 2022     Major depressive disorder      Neuropathic pain      Status post liver transplantation (H) 2023     Type II Diabetes       Past Surgical History:   Procedure Laterality Date     BENCH LIVER  2023    Procedure: Bench liver;  Surgeon: Thelma Sterling MD;  [facility]     COLONOSCOPY N/A 2023    Procedure: Colonoscopy;  Surgeon: Osman Montoya MD;  [facility]     ENDOSCOPIC ULTRASOUND LOWER GASTROINTESTIONAL TRACT (GI) N/A 2023    Procedure: ULTRASOUND, LOWER GI TRACT, ENDOSCOPIC with glueing;  Surgeon: Osman Montoya MD;  Location: [facility] OR     ESOPHAGOSCOPY, GASTROSCOPY, DUODENOSCOPY (EGD), COMBINED N/A 2022    Procedure: ESOPHAGOGASTRODUODENOSCOPY (EGD) with esophageal banding;  Surgeon: Gary Hurst MD;  Location: [facility] Main OR     ESOPHAGOSCOPY, GASTROSCOPY, DUODENOSCOPY (EGD), COMBINED N/A 2022    Procedure: ESOPHAGOGASTRODUODENOSCOPY;  Surgeon: Gavino Reza MD;  Location: [facility] Main OR     ESOPHAGOSCOPY, GASTROSCOPY, DUODENOSCOPY (EGD), COMBINED N/A 2023    Procedure:  ESOPHAGOGASTRODUODENOSCOPY (EGD) with esophageal variceal banding;  Surgeon: Juan Boo MD;  Location: [facility] Main OR     ESOPHAGOSCOPY, GASTROSCOPY, DUODENOSCOPY (EGD), COMBINED N/A 2023    Procedure: ESOPHAGOGASTRODUODENOSCOPY (EGD);  Surgeon: Juan Boo MD;  Location: [facility] Main OR     ESOPHAGOSCOPY, GASTROSCOPY, DUODENOSCOPY (EGD), COMBINED N/A 2023    Procedure: Esophagoscopy, gastroscopy, duodenoscopy (EGD), combined;  Surgeon: Osman Montoya MD;  Location: [facility] OR     ESOPHAGOSCOPY, GASTROSCOPY, DUODENOSCOPY (EGD), COMBINED N/A 2023    Procedure: ESOPHAGOGASTRODUODENOSCOPY (EGD);  Surgeon: Leventhal, Thomas Michael, MD;  Location: [facility] OR     IR CVC NON TUNNEL LINE REMOVAL  3/10/2023     IR CVC TUNNEL PLACEMENT > 5 YRS OF AGE  3/10/2023     IR CVC TUNNEL REMOVAL RIGHT  2023     IR LIVER BIOPSY PERCUTANEOUS  2023     MIDLINE DOUBLE LUMEN PLACEMENT  2022          PICC TRIPLE LUMEN PLACEMENT  2022          RETURN LIVER TRANSPLANT N/A 3/1/2023    Procedure: RETURN TO OPERATING ROOM, AFTER LIVER TRANSPLANT;  Surgeon: Thelma Sterling MD;  Location: [facility] OR     TRANSPLANT LIVER RECIPIENT  DONOR N/A 2023    Procedure: Transplant liver recipient  donor;  Surgeon: Thelma Sterling MD;  Location: [facility] OR      No Known Allergies   Social History     Tobacco Use     Smoking status: Former     Smokeless tobacco: Never     Tobacco comments:     A pack lasted a year, hardly smoked   Vaping Use     Vaping status: Former   Substance Use Topics     Alcohol use: Not Currently     Comment: No ETOH since 20      Wt Readings from Last 1 Encounters:   23 66.9 kg (147 lb 7.8 oz)        Anesthesia Evaluation   Pt has had prior anesthetic. Type: General.        ROS/MED HX  ENT/Pulmonary: Comment: Intubated, minimal ventilatory support.      Neurologic: Comment: Sedated, opens his eyes to painful stimulation. Not following commands. History of hepatic  encephalopathy.      Cardiovascular: Comment: Off vasoactive gtt at the time of this evaluation.    (+) hypertension-----    METS/Exercise Tolerance:     Hematologic: Comments: Significant blood products requirement overnight.  2U PRBC, 2 U FFP, 2U Cryo, 2 Liters LR, 500 ml Albumin. This morning received 3U PRBC, 1U FFP.      Musculoskeletal:       GI/Hepatic:     (+) hepatitis type Alcoholic, liver disease,     Renal/Genitourinary: Comment: Acute Kidney Injury, anuric since last night.  Will start CRRT after surgery today. Dialysis catheter in place Right groin. Electrolytes results pending.     (+) renal disease, type: ARF,     Endo: Comment: Insulin gtt     (+) type I DM,     Psychiatric/Substance Use:     (+) psychiatric history other (comment) (Asperger's syndrome) alcohol abuse     Infectious Disease:  - neg infectious disease ROS     Malignancy:  - neg malignancy ROS     Other:            Physical Exam    Airway  airway exam normal      Mallampati: II   TM distance: > 3 FB   Neck ROM: full   Mouth opening: > 3 cm    Respiratory Devices and Support         Dental    unable to assess    (+) Minor Abnormalities - some fillings, tiny chips      Cardiovascular   cardiovascular exam normal       Rhythm and rate: regular and normal     Pulmonary                   OUTSIDE LABS:  CBC:   Lab Results   Component Value Date    WBC 3.1 (L) 05/16/2023    WBC 4.6 05/12/2023    HGB 11.2 (L) 05/16/2023    HGB 10.2 (L) 05/12/2023    HCT 36.1 (L) 05/16/2023    HCT 31.0 (L) 05/12/2023     05/16/2023     05/12/2023     BMP:   Lab Results   Component Value Date     (L) 05/23/2023     05/16/2023    POTASSIUM 5.1 (H) 05/23/2023    POTASSIUM 5.5 (H) 05/16/2023    CHLORIDE 94 (L) 05/23/2023    CHLORIDE 98 05/16/2023    CO2 31 05/23/2023    CO2 32 (H) 05/16/2023    BUN 25 (H) 05/23/2023    BUN 22 05/16/2023    CR 0.85 05/23/2023    CR 0.77 05/16/2023     (H) 05/24/2023    GLC 70 05/23/2023     COAGS:    Lab Results   Component Value Date    PTT 30 03/07/2023    INR 0.98 05/18/2023    FIBR 375 03/07/2023     POC: No results found for: BGM, HCG, HCGS  HEPATIC:   Lab Results   Component Value Date    ALBUMIN 3.4 (L) 05/23/2023    PROTTOTAL 7.0 05/23/2023    ALT 82 (H) 05/23/2023    AST 62 (H) 05/23/2023    ALKPHOS 1,186 (H) 05/23/2023    BILITOTAL 2.2 (H) 05/23/2023    DESI 11 (L) 03/05/2023     OTHER:   Lab Results   Component Value Date    PH 7.32 (L) 03/04/2023    LACT 0.7 03/06/2023    A1C 5.2 03/12/2023    SARTHAK 9.9 05/23/2023    PHOS 3.3 05/16/2023    MAG 1.4 (L) 05/23/2023    LIPASE 19 03/01/2023    AMYLASE 62 03/01/2023    TSH 1.09 07/30/2022       Anesthesia Plan    ASA Status:  3   NPO Status:  ELEVATED Aspiration Risk/Unknown (Popcorn at 1 pm)    Anesthesia Type: General.     - Airway: ETT   Induction: Intravenous.   Maintenance: Balanced.        Consents    Anesthesia Plan(s) and associated risks, benefits, and realistic alternatives discussed. Questions answered and patient/representative(s) expressed understanding.    - Discussed:     - Discussed with:  Patient      - Extended Intubation/Ventilatory Support Discussed: No.      - Patient is DNR/DNI Status: No    Use of blood products discussed: No .     Postoperative Care    Pain management: Multi-modal analgesia.   PONV prophylaxis: Ondansetron (or other 5HT-3), Dexamethasone or Solumedrol     Comments:                PAC Discussion and Assessment    ASA Classification: 4    Anesthetic techniques and relevant risks discussed: GA  Invasive monitoring and risk discussed: Yes    Possibility and Risk of blood transfusion discussed: Yes  NPO instructions given: NPO after midnight    Needs early admission to pre-op area: No                  Attending Anesthesiologist Anesthesia Assessment: A-line, CVL, PA Catheter in place  Lab results 10 am today: K 6.2. Shifted with 10 U Insulin/D50. Will start CRRT in the OR                           Cole Cordero MD

## 2023-05-24 NOTE — TELEPHONE ENCOUNTER
Leticia called to report she had talked with primary coordinator today and was to watch for signs of infection.  She reports that they are likely to admit Dillon tomorrow, 5/24.  She states he has been afebrile, but has started itching this evening.  There is no hives, no redness, no difficulty breathing and he otherwise feels ok.  Leticia reassured to continue to monitor for signs of infection and this information will be passed on to primary coordinator.  Leticia will page with further questions or concerns.

## 2023-05-25 LAB
LABORATORY REPORT: NORMAL
PLPETH BLD-MCNC: <10 NG/ML
POPETH BLD-MCNC: <10 NG/ML

## 2023-05-25 ASSESSMENT — ACTIVITIES OF DAILY LIVING (ADL)
ADLS_ACUITY_SCORE: 35
ADLS_ACUITY_SCORE: 35

## 2023-05-25 NOTE — ANESTHESIA POSTPROCEDURE EVALUATION
Patient: Dillon Salter    Procedure: Procedure(s):  Endoscopic retrograde cholangiopancreatogram. Biliary sphincterotomy, dilation, abnd stent placement       Anesthesia Type:  General    Note:  Disposition: Inpatient   Postop Pain Control: Uneventful            Sign Out: Well controlled pain   PONV: No   Neuro/Psych: Uneventful            Sign Out: Acceptable/Baseline neuro status   Airway/Respiratory: Uneventful            Sign Out: Acceptable/Baseline resp. status   CV/Hemodynamics: Uneventful            Sign Out: Acceptable CV status; No obvious hypovolemia; No obvious fluid overload   Other NRE: NONE   DID A NON-ROUTINE EVENT OCCUR? No           Last vitals:  Vitals Value Taken Time   /91 05/24/23 2125   Temp 36.4  C (97.6  F) 05/24/23 2100   Pulse 62 05/24/23 2129   Resp 16 05/24/23 2129   SpO2 100 % 05/24/23 2129   Vitals shown include unvalidated device data.    Electronically Signed By: Carlos Alberto Roque MD  May 24, 2023  9:30 PM

## 2023-05-25 NOTE — DISCHARGE SUMMARY
Pt discharged home with his mother from 7A via wheelchair.  Belongings with pt.  Right PIV  removed.  After Visit Summary reviewed with pt & mother, questions answered.

## 2023-05-25 NOTE — PROVIDER NOTIFICATION
"Paged Dr. Mcnulty at [phone]:    \"MCKENNA Salter ([id number])  BP down to 130/86 following Hydralazine.  Amylase: 87.  Lipase 82.  Proceed with discharge?  Rob Patel RN (handing off to Janette Jimenez)  [phone]\"    Waiting on response.  "

## 2023-05-25 NOTE — ANESTHESIA CARE TRANSFER NOTE
Patient: Dillon Salter    Procedure: Procedure(s):  Endoscopic retrograde cholangiopancreatogram. Biliary sphincterotomy, dilation, abnd stent placement       Diagnosis: S/P liver transplant (H) [Z94.4]  Diagnosis Additional Information: No value filed.    Anesthesia Type:   General     Note:    Oropharynx: oropharynx clear of all foreign objects and spontaneously breathing  Level of Consciousness: awake  Oxygen Supplementation: face mask  Level of Supplemental Oxygen (L/min / FiO2): 6    Dentition: dentition unchanged  Vital Signs Stable: post-procedure vital signs reviewed and stable  Report to RN Given: handoff report given  Patient transferred to: PACU    Handoff Report: Identifed the Patient, Identified the Reponsible Provider, Reviewed the pertinent medical history, Discussed the surgical course, Reviewed Intra-OP anesthesia mangement and issues during anesthesia, Set expectations for post-procedure period and Allowed opportunity for questions and acknowledgement of understanding      Vitals:  Vitals Value Taken Time   /90    Temp     Pulse 63    Resp 15    SpO2 100 % 05/24/23 2058   Vitals shown include unvalidated device data.    Electronically Signed By: VAMSHI Issa CRNA  May 24, 2023  8:59 PM

## 2023-05-25 NOTE — PROVIDER NOTIFICATION
"Paged Dr. Mcnulty at [phone]:    \"MCKENNA Salter ([id number])  Pt just arrived from PACU. BP is 188/104. Other vitals WDL Discharge orders are in, and we're waiting for lab results. But do you want him to have something for that BP?  Rob Patel, RN  [phone]\"    Order for PRN Hydralazine and Labetalol placed.  "

## 2023-05-25 NOTE — ANESTHESIA PROCEDURE NOTES
Airway       Patient location during procedure: OR       Procedure Start/Stop Times: 5/24/2023 7:58 PM  Staff -        Anesthesiologist:  Carlos Alberto Roque MD       CRNA: Gin Grande APRN CRNA       Performed By: CRNA  Consent for Airway        Urgency: elective  Indications and Patient Condition       Indications for airway management: jalen-procedural       Induction type:intravenous       Mask difficulty assessment: 1 - vent by mask    Final Airway Details       Final airway type: endotracheal airway       Successful airway: ETT - single  Endotracheal Airway Details        ETT size (mm): 7.0       Cuffed: yes       Successful intubation technique: direct laryngoscopy       DL Blade Type: MAC 3       Grade View of Cords: 1       Adjucts: stylet       Position: Right       Measured from: gums/teeth       Secured at (cm): 22       Bite block used: Oral Airway (Green Endo BB)    Post intubation assessment        Placement verified by: capnometry, equal breath sounds and chest rise        Number of attempts at approach: 1       Number of other approaches attempted: 0       Secured with: pink tape       Ease of procedure: easy       Dentition: Intact and Unchanged    Medication(s) Administered   Medication Administration Time: 5/24/2023 7:58 PM

## 2023-05-25 NOTE — PROGRESS NOTES
"BP (!) 189/104   Pulse 63   Temp 97.7  F (36.5  C) (Oral)   Resp 12   Ht 1.651 m (5' 5\")   Wt 66.9 kg (147 lb 7.8 oz)   SpO2 96%   BMI 24.54 kg/m      Shift: Arrival on unit approximately 2200 to 2330  Isolation Status: NA  VS: hypertensive on RA, afebrile  Behaviors: Drowsy, able to answer questions appropriately  Labs: Amylase and Lipase drawn, waiting on results  Respiratory: Clear lung sounds, no shortness of breath reported  Cardiac: regular rhythm, occasionally slightly bradycardic  Pain/Nausea: Denied pain and nausea  PRN: Hydralazine x1  Diet: NPO plus ice chips  IV Access: RUE PIV  Infusion(s): Completed 1L LR bolus  Lines/Drains: NA  Mobility: In bed  Events/Education: Transferred to unit from PACU.  Amylase and Lipase drawn  Plan: If Amylase and Lipase are at acceptable level, patient can discharge.  Please notify on-call with results.    "

## 2023-05-25 NOTE — DISCHARGE SUMMARY
[facility]    Discharge Summary  Transplant Surgery    Date of Admission:  5/24/2023  Date of Discharge:  5/25/2023  Discharging Provider: Evy Mcnulty MD General Surgery Resident PGY1 (attending of record Dr. Sterling)  Date of Service (when I saw the patient): 5/25/2023    Discharge Diagnoses   Benign appearing anastomotic stricture s/p biliary sphincterotomy and biliary stent placement    Procedure/Surgery Information   Procedure: Procedure(s):  Endoscopic retrograde cholangiopancreatogram. Biliary sphincterotomy, dilation, abnd stent placement   Surgeon(s): Surgeon(s) and Role:     * Jarrell Mullins MD - Primary       Non-operative procedures See above     History of Present Illness   Dillon Salter is a 27 year old male who presented with elevated alkaline phosphatase    Hospital Course   Dillon Salter was directly admitted on 5/24/2023.  The following problems were addressed during his hospitalization:  - Biliary anastomotic stricture    Antibiotics prescribed at discharge: Ciprofloxacin 500 mg BID    Imaging study follow up needs: none    Significant Findings:   - Ampulla obscured by an overlying fold    - Benign appearing anastomotic stricture within minimal upstream dilation of otherwise healthy  appearing donor ducts   - Uncomplicated biliary sphincterotomy, biliary debris removal, dilation of the stenosis and placement of a 10F stent across the stenosis and sphincterotomy    Recommendations by GI:                         - Complete a short course of antibiotic as prescribed                          (sent to local pharmacy, should start tomorrow evening)                          - Follow up with established physicians as scheduled                          - ERCP with Dr Wright may be rescheduled from                          next week to 8-10 weeks from now                          - Hold antithrombotics for at least three days; all                           other medications may resume without delay along with                          a diet and activity on discharge                          - The findings and recommendations were discussed with                          the patient and their family with Dr. Jarrell Mullins        Discharge Disposition   Discharged to home with parents  Condition at discharge: Stable    Pending Results   These results will be followed up by Transplant Team  Unresulted Labs Ordered in the Past 30 Days of this Admission     Date and Time Order Name Status Description    5/23/2023  2:12 PM PHOSPHATIDYLETHANOL (PETH), WHOLE BLOOD In process         Final pathology results: No pathology submitted  Primary Care Physician   Gary Rodrigues    Consultations This Hospital Stay   GI PANCREATICOBILIARY ADULT IP CONSULT    Time Spent on this Encounter   I have spent less than 30 minutes on this discharge.    Discharge Orders   Discharge Medications   Current Discharge Medication List      START taking these medications    Details   ciprofloxacin (CIPRO) 500 MG tablet Take 1 tablet (500 mg) by mouth 2 times daily  Qty: 6 tablet, Refills: 0    Associated Diagnoses: Liver replaced by transplant (H)         CONTINUE these medications which have NOT CHANGED    Details   acetaminophen (TYLENOL) 325 MG tablet 2 tablets (650 mg) by Oral or Feeding Tube route every 8 hours as needed for mild pain or fever  Qty: 100 tablet, Refills: 0    Associated Diagnoses: Liver replaced by transplant (H)      aspirin (ASA) 81 MG EC tablet Take 1 tablet (81 mg) by mouth daily  Qty: 30 tablet, Refills: 0    Comments: Will purchase OTC.  Associated Diagnoses: Liver replaced by transplant (H)      FLUoxetine (PROZAC) 20 MG capsule Take 3 capsules (60 mg) by mouth At Bedtime  Qty: 90 capsule, Refills: 0    Associated Diagnoses: Anxiety and depression      gabapentin (NEURONTIN) 100 MG capsule Take 100 mg by mouth      insulin aspart (NOVOLOG FLEXPEN) 100  UNIT/ML pen 1 unit per 10 grams of carb, plus additional units based on following scale   For Pre-Meal  - 164 give 1 unit. For Pre-Meal  - 189 give 2 units. For Pre-Meal  - 214 give 3 units. For Pre-Meal  - 239 give 4 units. For Pre-Meal  - 264 give 5 units. For Pre-Meal  - 289 give 6 units. For Pre-Meal  - 314 give 7 units. For Pre-Meal  - 339 give 8 units. For Pre-Meal  - 364 give 9 units.  For Pre-Meal BG greater than or equal to 365 give 10 units  Qty: 15 mL, Refills: 3    Associated Diagnoses: Diabetes mellitus type 2 in obese (H)      insulin glargine (LANTUS PEN) 100 UNIT/ML pen Inject 15 Units Subcutaneous every morning AND 10 Units At Bedtime.  Qty: 15 mL, Refills: 0    Comments: If Lantus is not covered by insurance, may substitute Basaglar or Semglee or other insulin glargine product per insurance preference at same dose and frequency.    Associated Diagnoses: Diabetes mellitus type 2 in obese (H)      magnesium oxide (MAG-OX) 400 MG tablet Take 2 tablets (800 mg) by mouth 2 times daily  Qty: 120 tablet, Refills: 3    Associated Diagnoses: Hypomagnesemia; Liver replaced by transplant (H)      metoprolol tartrate (LOPRESSOR) 25 MG tablet Take 1 tablet (25 mg) by mouth 2 times daily  Qty: 60 tablet, Refills: 1    Associated Diagnoses: Renovascular hypertension      multivitamin w/minerals (THERA-VIT-M) tablet Take 1 tablet by mouth daily      mycophenolate (GENERIC EQUIVALENT) 250 MG capsule Take 3 capsules (750 mg) by mouth 2 times daily  Qty: 540 capsule, Refills: 3    Comments: TXP DT 2/28/2023 (Liver) TXP Dischg DT 3/19/2023 DX Liver replaced by transplant Z94.4 TX Center [facility] ([place], [place])  Associated Diagnoses: Liver replaced by transplant (H)      oxyCODONE (ROXICODONE) 5 MG tablet Take 5 mg by mouth every 6 hours as needed for severe pain From PCP      pantoprazole (PROTONIX) 40 MG EC tablet Take 1  tablet (40 mg) by mouth 2 times daily  Qty: 180 tablet, Refills: 3    Associated Diagnoses: Liver replaced by transplant (H)      predniSONE (DELTASONE) 5 MG tablet Take 1 tablet (5 mg) by mouth daily  Qty: 90 tablet, Refills: 1    Comments: TXP DT 2/28/2023 (Liver) TXP Dischg DT 3/19/2023 DX Liver replaced by transplant Z94.4 [facility] ([place], [place])  Associated Diagnoses: Liver replaced by transplant (H)      sulfamethoxazole-trimethoprim (BACTRIM) 400-80 MG tablet Take 1 tablet by mouth three times a week Increase as instructed per transplant team  Qty: 36 tablet, Refills: 3    Associated Diagnoses: Liver replaced by transplant (H)      tacrolimus (GENERIC EQUIVALENT) 0.5 MG capsule Take 1 capsule (0.5 mg) by mouth every morning Total dose: 3.5 mg in the AM and 3 mg in the PM  Qty: 180 capsule, Refills: 1    Comments: TXP DT 2/28/2023 (Liver) TXP Dischg DT 3/19/2023 DX Liver replaced by transplant Z94.4 [facility] ([place], [place])  Associated Diagnoses: Liver replaced by transplant (H)      tacrolimus (GENERIC EQUIVALENT) 1 MG capsule Take 3 capsules (3 mg) by mouth every 12 hours Total dose: 3.5 mg in the AM, 3 mg in the PM  Qty: 360 capsule, Refills: 3    Comments: TXP DT 2/28/2023 (Liver) TXP Dischg DT 3/19/2023 DX Liver replaced by transplant Z94.4 [facility] ([place], [place])  Associated Diagnoses: Liver replaced by transplant (H)      ursodiol (ACTIGALL) 300 MG capsule Take 1 capsule (300 mg) by mouth 2 times daily  Qty: 180 capsule, Refills: 3    Associated Diagnoses: Liver replaced by transplant (H)      valGANciclovir (VALCYTE) 450 MG tablet Take 1 tablet (450 mg) by mouth twice a week Increase to 450mg daily when directed by transplant team with improving renal function  Qty: 30 tablet, Refills: 2    Associated Diagnoses: Alcoholic cirrhosis of liver with  ascites (H)      Vitamin D3 (CHOLECALCIFEROL) 25 mcg (1000 units) tablet 1 tablet (25 mcg) by Oral or Feeding Tube route daily  Qty: 90 tablet, Refills: 3    Associated Diagnoses: Acute renal failure, unspecified acute renal failure type (H)                Reason for your hospital stay    You were admitted for ERCP procedure and you had a biliary stricture removed and a biliary stent placed.     Activity    Your activity upon discharge: activity as tolerated     Discharge Instructions    Instructions from your GI Team:  - Complete a short course of antibiotic as prescribed (sent to local pharmacy, should start tomorrow evening)   - Follow up with established physicians as scheduled   - ERCP with Dr Wright may be rescheduled from next week to 8-10 weeks from now   - Hold antithrombotics (aspirin) for at least three days; all other medications may resume without delay along with   a diet and activity on discharge         Data   Results for orders placed or performed during the hospital encounter of 05/24/23   XR Surgery SIDDHARTHA L/T 5 Min Fluoro w Stills    Narrative    This exam was marked as non-reportable because it will not be read by a   radiologist or a [facility] non-radiologist provider.               Lab Results   Component Value Date    LIPASE 82 (H) 05/24/2023    LIPASE 19 03/01/2023    LIPASE <9 02/11/2023    LIPASE 50 08/14/2022    LIPASE 35 07/28/2022     Lab Results   Component Value Date    AMYLASE 87 05/24/2023    AMYLASE 62 03/01/2023    AMYLASE 12 (L) 02/27/2023    AMYLASE 14 (L) 02/24/2023     Discussed with Liver Transplant Fellow, Dr. Cy Valdez M.D.    Evy Mcnulty M.D.  General Surgery Resident PGY1  [facility]    \I have reviewed history, examined patient and discussed plan with the fellow/resident/CHRISTA.    I concur with the findings in this note.    Time spent on discharge activities: 45 minutes.

## 2023-05-25 NOTE — OP NOTE
ERCP 2023  7:21 PM [address] [place] [address] [phone]     Endoscopy Department   _______________________________________________________________________________   Patient Name: Dillon Hendrickson           Procedure Date: 2023 7:21 PM   MRN: [id number]                       Account Number: [id number]   YOB: 1995              Admit Type: Inpatient   Age: 27                               Room: [place] [address]   Gender: Male                          Note Status: Finalized   Attending MD: NADIRA STEWART MD,   Total Sedation Time:   _______________________________________________________________________________       Procedure:             ERCP   Indications:           Abnormal MRCP, Abnormal liver function test, Post                          liver transplant assessment   Providers:             NADIRA STEWART MD   Patient Profile:       Mr hendrickson is a 28yo gentleman with a history of                          alcoholic cirrohosis status post  donor duct                          to duct liver transplant in February of this year who                          has rising liver studies and itching. MRI/MRCP                          suggests an anastomotic stenosis and he now proceeds                          to management by ERCP,   Referring MD:          RAFIQ SAM   Requesting Provider:   CANDACE JASMINE MD   Medicines:             Indomethacin 100 mg GA, General Anesthesia, Invanz 1 g                          IV   Complications:         No immediate complications.   _______________________________________________________________________________   Procedure:             Pre-Anesthesia Assessment:                          - Prior to the procedure, a History and Physical was                          performed, and patient medications and allergies were                          reviewed. The patient  is competent. The risks and                          benefits of the procedure and the sedation options and                          risks were discussed with the patient. All questions                          were answered and informed consent was obtained.                          Patient identification and proposed procedure were                          verified by the nurse in the pre-procedure area.                          Mental Status Examination: alert and oriented. Airway                          Examination: Mallampati Class II (the uvula but not                          tonsillar pillars visualized). Respiratory                          Examination: clear to auscultation. CV Examination:                          normal. ASA Grade Assessment: III - A patient with                          severe systemic disease. After reviewing the risks and                          benefits, the patient was deemed in satisfactory                          condition to undergo the procedure. The anesthesia                          plan was to use general anesthesia. Immediately prior                          to administration of medications, the patient was                          re-assessed for adequacy to receive sedatives. The                          heart rate, respiratory rate, oxygen saturations,                          blood pressure, adequacy of pulmonary ventilation, and                          response to care were monitored throughout the                          procedure. The physical status of the patient was                          re-assessed after the procedure. After obtaining                          informed consent, the scope was passed under direct                          vision. Throughout the procedure, the patient's blood                          pressure, pulse, and oxygen saturations were monitored                          continuously. The duodenoscope was introduced through              "             the mouth, and used to inject contrast into and used                          to inject contrast into the bile duct. The ERCP was                          accomplished without difficulty. The patient tolerated                          the procedure well.                                                                                     Findings:        A  film of the right upper quadrant demonstrated surgical clips.        Limited white light imaging was notable for solid food residue in the        stomach and the ampulla was covered by an overlying fold. The bile duct        was selectively deeply cannulated with the short-nosed traction        sphincterotome in concert with an 0.025\" Visiglide wire. Contrast was        injected and I personally interpreted the bile duct images. Ductal flow        of contrast was adequate, image quality was excellent and contrast        extended throughout the intrahepatics. The donor intrahepatics and        extrahepaics were diffusely healthy appearing though mildly dilated        upstream of a moderately tight, benign anastomotic stenosis. The        recipient common was unremarkable. A 5 mm biliary sphincterotomy was        made with a monofilament traction (standard) sphincterotome using ERBE        electrocautery. There was no post-sphincterotomy bleeding. The biliary        tree was swept with a 12 mm balloon starting at the bifurcation. Debris        was swept from the duct. The stenosis was then dilated to 6mm with a        Hurricane balloon. A 10 Fr by 9 cm stent with a single external flap and        a single internal flap was placed 9 cm into the common duct across the        stenosis. Bile flowed through the stent and the stent was in good        position. The pancreatic duct and orfice were selectively not        interrogated.                                                                                     Impression:            - Ampulla " obscured by an overlying fold                          - Benign appearing anastomotic stricture within                          minimal upstream dilation of otherwise healthy                          appearing donor ducts                          - Uncomplicated biliary sphincterotomy, biliary debris                          removal, dilation of the stenosis and placement of a                          10F stent across the stenosis and sphincterotomy   Recommendation:        - General anesthesia recovery with disposiiton based                          on course of 2h observation and results of labs at                          that juncture; in the meanwhile the patient will                          receive a second liter of lactated ringe                          - Complete a short course of antibiotic as prescribed                          (sent to local pharmacy, should start tomorrow evening)                          - Follow up with established physicians as scheduled                          - ERCP with Dr Wright may be rescheduled from                          next week to 8-10 weeks from now                          - Hold antithrombotics for at least three days; all                          other medications may resume without delay along with                          a diet and activity on discharge                          - The findings and recommendations were discussed with                          the patient and their family                                                                                       electronically signed by CELESTINO Mullins

## 2023-05-26 ENCOUNTER — TELEPHONE (OUTPATIENT)
Dept: TRANSPLANT | Facility: CLINIC | Age: 28
End: 2023-05-26
Payer: COMMERCIAL

## 2023-05-26 RX ORDER — LIDOCAINE 40 MG/G
CREAM TOPICAL
Status: CANCELLED | OUTPATIENT
Start: 2023-05-26

## 2023-05-26 RX ORDER — INDOMETHACIN 50 MG/1
100 SUPPOSITORY RECTAL
Status: CANCELLED | OUTPATIENT
Start: 2023-05-26

## 2023-05-26 NOTE — TELEPHONE ENCOUNTER
"Call to Leticia to check in post ERCP. Dillon was prescribed a few days of Ciprofloxacin, he has taken 3 doses since discharge from ERCP and has had \"explosive diarrhea\" and dizziness from it. Leticia states she will not be giving him any more of the cipro. Other than that, Dillon is feeling well. Leticia will be sure that Dillon get labs on Tuesday if she can get him an appointment, or Wednesday if Tuesday is unavailable. Discussed decreasing lab frequency to weekly when his LFTs stabilize, maybe in a couple weeks. Leticia is agreeable to this plan.   "

## 2023-05-31 ENCOUNTER — TELEPHONE (OUTPATIENT)
Dept: TRANSPLANT | Facility: CLINIC | Age: 28
End: 2023-05-31
Payer: COMMERCIAL

## 2023-06-01 ENCOUNTER — TELEPHONE (OUTPATIENT)
Dept: TRANSPLANT | Facility: CLINIC | Age: 28
End: 2023-06-01

## 2023-06-01 ENCOUNTER — LAB (OUTPATIENT)
Dept: LAB | Facility: CLINIC | Age: 28
End: 2023-06-01
Payer: COMMERCIAL

## 2023-06-01 DIAGNOSIS — Z94.4 LIVER REPLACED BY TRANSPLANT (H): ICD-10-CM

## 2023-06-01 LAB
ALBUMIN SERPL-MCNC: 3.5 G/DL (ref 3.5–5)
ALP SERPL-CCNC: 534 U/L (ref 45–120)
ALT SERPL W P-5'-P-CCNC: 28 U/L (ref 0–45)
ANION GAP SERPL CALCULATED.3IONS-SCNC: 3 MMOL/L (ref 5–18)
AST SERPL W P-5'-P-CCNC: 33 U/L (ref 0–40)
BILIRUB DIRECT SERPL-MCNC: 0.4 MG/DL
BILIRUB SERPL-MCNC: 1.1 MG/DL (ref 0–1)
BUN SERPL-MCNC: 31 MG/DL (ref 8–22)
CALCIUM SERPL-MCNC: 9.8 MG/DL (ref 8.5–10.5)
CHLORIDE BLD-SCNC: 99 MMOL/L (ref 98–107)
CO2 SERPL-SCNC: 33 MMOL/L (ref 22–31)
CREAT SERPL-MCNC: 1.09 MG/DL (ref 0.7–1.3)
ERYTHROCYTE [DISTWIDTH] IN BLOOD BY AUTOMATED COUNT: 12.8 % (ref 10–15)
GFR SERPL CREATININE-BSD FRML MDRD: >90 ML/MIN/1.73M2
GLUCOSE BLD-MCNC: 194 MG/DL (ref 70–125)
HCT VFR BLD AUTO: 32.1 % (ref 40–53)
HGB BLD-MCNC: 10.7 G/DL (ref 13.3–17.7)
MAGNESIUM SERPL-MCNC: 1.3 MG/DL (ref 1.8–2.6)
MCH RBC QN AUTO: 30.2 PG (ref 26.5–33)
MCHC RBC AUTO-ENTMCNC: 33.3 G/DL (ref 31.5–36.5)
MCV RBC AUTO: 91 FL (ref 78–100)
PHOSPHATE SERPL-MCNC: 3.6 MG/DL (ref 2.5–4.5)
PLATELET # BLD AUTO: 176 10E3/UL (ref 150–450)
POTASSIUM BLD-SCNC: 5.4 MMOL/L (ref 3.5–5)
PROT SERPL-MCNC: 6.6 G/DL (ref 6–8)
RBC # BLD AUTO: 3.54 10E6/UL (ref 4.4–5.9)
SODIUM SERPL-SCNC: 135 MMOL/L (ref 136–145)
TACROLIMUS BLD-MCNC: 10.5 UG/L (ref 5–15)
TME LAST DOSE: NORMAL H
TME LAST DOSE: NORMAL H
WBC # BLD AUTO: 5.2 10E3/UL (ref 4–11)

## 2023-06-01 PROCEDURE — 80197 ASSAY OF TACROLIMUS: CPT

## 2023-06-01 PROCEDURE — 83735 ASSAY OF MAGNESIUM: CPT

## 2023-06-01 PROCEDURE — 85027 COMPLETE CBC AUTOMATED: CPT

## 2023-06-01 PROCEDURE — 84100 ASSAY OF PHOSPHORUS: CPT

## 2023-06-01 PROCEDURE — 36415 COLL VENOUS BLD VENIPUNCTURE: CPT

## 2023-06-01 PROCEDURE — 82248 BILIRUBIN DIRECT: CPT

## 2023-06-01 NOTE — TELEPHONE ENCOUNTER
Provider Call: General  Route to LPN    Reason for call: Home Care is planning on closing pt's care  He is no longer homebound  They have made several attempts to reach pt or his mother with no response  She decided to do a non-verbal discharge on pt     Call back needed? No     ADDENDUM:  Noted. Will inform Leticia on next call, but I believe Leticia was already aware of this information.

## 2023-06-02 ENCOUNTER — TELEPHONE (OUTPATIENT)
Dept: TRANSPLANT | Facility: CLINIC | Age: 28
End: 2023-06-02
Payer: COMMERCIAL

## 2023-06-02 DIAGNOSIS — Z94.4 LIVER REPLACED BY TRANSPLANT (H): ICD-10-CM

## 2023-06-02 RX ORDER — TACROLIMUS 0.5 MG/1
0.5 CAPSULE ORAL PRN
Qty: 180 CAPSULE | Refills: 1 | Status: SHIPPED | OUTPATIENT
Start: 2023-06-02 | End: 2024-01-05

## 2023-06-02 RX ORDER — TACROLIMUS 1 MG/1
3 CAPSULE ORAL EVERY 12 HOURS
Qty: 360 CAPSULE | Refills: 3 | Status: SHIPPED | OUTPATIENT
Start: 2023-06-02 | End: 2023-06-30

## 2023-06-02 NOTE — TELEPHONE ENCOUNTER
ISSUE:   Tacrolimus IR level 10.5 on 6/1, goal 8-10, dose 3.5 mg AM, 3 mg PM.    PLAN:   Please call patient and confirm this was an accurate 12-hour trough. Verify Tacrolimus IR dose 3.5 mg AM, 3 mg PM. Confirm no new medications or illness. Confirm no missed doses. If accurate trough and accurate dose, decrease Tacrolimus IR dose to 3 mg BID and repeat labs on Monday      OUTCOME:   Spoke with patient, they confirm accurate trough level and current dose 3.5 mg AM, 3 mg PM. Patient confirmed dose change to 3 mg BID and to repeat labs on Monday. Orders sent to preferred pharmacy for dose change and lab for repeat labs. Patient voiced understanding of plan.     Spoke to Leticia. Ok to discontinue valcyte now that he is more than 3 months post transplant. Also, informed her of home care discharging Dillon now that he is not homebound. Reviewed lab frequency, continue twice a week labs until LFTs stabilize and then we will revisit changing to weekly labs. Leticia verbalized understanding.    Med list updated.

## 2023-06-06 ENCOUNTER — PREP FOR PROCEDURE (OUTPATIENT)
Dept: GASTROENTEROLOGY | Facility: CLINIC | Age: 28
End: 2023-06-06
Payer: COMMERCIAL

## 2023-06-06 ENCOUNTER — TELEPHONE (OUTPATIENT)
Dept: TRANSPLANT | Facility: CLINIC | Age: 28
End: 2023-06-06
Payer: COMMERCIAL

## 2023-06-06 ENCOUNTER — PATIENT OUTREACH (OUTPATIENT)
Dept: GASTROENTEROLOGY | Facility: CLINIC | Age: 28
End: 2023-06-06
Payer: COMMERCIAL

## 2023-06-06 DIAGNOSIS — K83.1 BILIARY STRICTURE (H): Primary | ICD-10-CM

## 2023-06-06 NOTE — TELEPHONE ENCOUNTER
Call to Leticia to remind her that Dillon needs labs drawn. She was unable to get Dillon to the lab today as she is sick and her  is out of town. She stated she will get Dillon to the lab tomorrow afternoon at the appropriate time for his tac level.

## 2023-06-06 NOTE — PROGRESS NOTES
Procedure/Imaging/Clinic: ERCP with stent exchange  Physician: Kyle  Timin-10 weeks  Scope time: Provider average  Anesthesia: General  Dx: Biliary stricture  Tier:3  Location: [facility]  Patient communication letter header:ERCP (Endoscopic Retrograde Cholangiopancreatography) with stent exchange.

## 2023-06-06 NOTE — PROGRESS NOTES
Follow up: Post ERCP on 23 with Dr. Mullins.      Post procedure recommendations:   ERCP with Dr Wright may be rescheduled from next week to 8-10 weeks from now.    Please assist in scheduling:     Procedure/Imaging/Clinic: ERCP with stent exchange  Physician: Kyle  Timin-10 weeks  Scope time: Provider average  Anesthesia: General  Dx: Biliary stricture  Tier:3  Location: [facility]  Patient communication letter header:ERCP (Endoscopic Retrograde Cholangiopancreatography) with stent exchange.     Comments: Patient's Mom agreeable to schedule 23    Called to discuss with patient. Explained they will need a , someone to stay with them for 24 hours and should stay in town for 24 hours (within 45 min of Hospital) post procedure.    Patient will need a pre-op physical within 30 days of procedure. If outside [facility] system, will need physical faxed to number [phone]   If you do not get a preop physical, your procedure could be cancelled, patient voiced understanding*    Preop Plan: Dr. Wood visit scheduled for 23.    Med Review    Blood thinner -  None  ASA - 81 mg; no hold per AE policy  Diabetic - Novolog / Lantus     Patient Education r/t procedure: MyChart    Does patient have any history of gastric bypass/gastric surgery/altered panc/bili anatomy? Liver transplant 2023    A pre-op nurse will call 1-2 days prior to the procedure.    NPO/Prep: No solid food 8 hours prior to arrival at the hospital. Clear liquids okay until one hour prior to arrival.     Verbalized understanding of all instructions. All questions answered. Clinic contact and scheduling numbers verified for future questions/concerns.    Marilu Levine RN, BSN  Care Coordinator  [facility]

## 2023-06-09 ENCOUNTER — TELEPHONE (OUTPATIENT)
Dept: PHARMACY | Facility: CLINIC | Age: 28
End: 2023-06-09
Payer: COMMERCIAL

## 2023-06-09 ENCOUNTER — TELEPHONE (OUTPATIENT)
Dept: TRANSPLANT | Facility: CLINIC | Age: 28
End: 2023-06-09
Payer: COMMERCIAL

## 2023-06-09 NOTE — TELEPHONE ENCOUNTER
Clinical Pharmacy Consult:                                                      Transplant Specific: 2 month post transplant medication review  Date of Transplant: 2/28/23  Type of Transplant: liver  First Transplant: yes  History of rejection: no    Immunosuppression Regimen   TAC 3.0mg qAM & 3.0mg qPM, Prednisone 5mg qAM and MMF 750mg qAM & 750mg qPM  Patient specific goal: 8-10  Most recent level: 10.5, date 6/1/23  Immunosuppressant Levels:  Supratherapeutic  Pt adherent to lab draws: yes  Scr:   Lab Results   Component Value Date    CR 1.09 06/01/2023     Side effects:     Prophylactic Medications  Antibacterial:  Bactrim ss 3 times a week  Scheduled Discontinue Date: 6 months    Antifungal: Not needed thus far  Scheduled Discontinue Date: N/A    Antiviral: CrCl 40 to 59 mL/minute: Valcyte 450 mg once daily   Scheduled Discontinue Date: Therapy Complete    Acid Reducer: Protonix (pantoprazole)  Scheduled Reviewed Date:      Thrombosis Prevention: Aspirin 81 mg PO daily  Scheduled Discontinue Date: managed by clinic    Blood Pressure Management  Frequency of home Blood Pressure checks:    Most recent home BP:    Patient Blood pressure goal:  140/90  Patient blood pressure at goal:       Hospitalizations/ER visits since last assessment:        Med rec/DUR performed: yes  Med Rec Discrepancies:      Could not contact appears adherent last tacro level was high.    Carlos Alberto King RP

## 2023-06-09 NOTE — TELEPHONE ENCOUNTER
Leticia called. Leticia was unable to get Dillon in to get labs this week as she, Dillon, and her  all had food poisoning all week. They were at a family wedding on Saturday and about 40 people got sick. She stated that Dillon took all of his meds, but some did not stay down, so he probably missed a couple doses over the course of the week. Leticia said Dillon is feeling much better now and she will try to get him in today for labs, Monday at the latest.

## 2023-06-12 ENCOUNTER — TELEPHONE (OUTPATIENT)
Dept: TRANSPLANT | Facility: CLINIC | Age: 28
End: 2023-06-12
Payer: COMMERCIAL

## 2023-06-12 NOTE — TELEPHONE ENCOUNTER
Dillon has not had labs drawn since 6/1. Leticia stated on Friday that she had made an appointment or was going to have his labs done on Saturday. They were not done.   Call to Leticia, left message telling her that his labs need to be done asap.

## 2023-06-13 ENCOUNTER — LAB (OUTPATIENT)
Dept: LAB | Facility: CLINIC | Age: 28
End: 2023-06-13
Payer: COMMERCIAL

## 2023-06-13 DIAGNOSIS — Z94.4 LIVER REPLACED BY TRANSPLANT (H): ICD-10-CM

## 2023-06-13 LAB
ALBUMIN SERPL-MCNC: 3.7 G/DL (ref 3.5–5)
ALP SERPL-CCNC: 255 U/L (ref 45–120)
ALT SERPL W P-5'-P-CCNC: 23 U/L (ref 0–45)
ANION GAP SERPL CALCULATED.3IONS-SCNC: 7 MMOL/L (ref 5–18)
AST SERPL W P-5'-P-CCNC: 24 U/L (ref 0–40)
BILIRUB DIRECT SERPL-MCNC: 0.3 MG/DL
BILIRUB SERPL-MCNC: 0.8 MG/DL (ref 0–1)
BUN SERPL-MCNC: 26 MG/DL (ref 8–22)
CALCIUM SERPL-MCNC: 9.5 MG/DL (ref 8.5–10.5)
CHLORIDE BLD-SCNC: 102 MMOL/L (ref 98–107)
CO2 SERPL-SCNC: 30 MMOL/L (ref 22–31)
CREAT SERPL-MCNC: 1.13 MG/DL (ref 0.7–1.3)
ERYTHROCYTE [DISTWIDTH] IN BLOOD BY AUTOMATED COUNT: 14.6 % (ref 10–15)
GFR SERPL CREATININE-BSD FRML MDRD: 90 ML/MIN/1.73M2
GLUCOSE BLD-MCNC: 268 MG/DL (ref 70–125)
HCT VFR BLD AUTO: 32.1 % (ref 40–53)
HGB BLD-MCNC: 10.3 G/DL (ref 13.3–17.7)
MAGNESIUM SERPL-MCNC: 1.5 MG/DL (ref 1.8–2.6)
MCH RBC QN AUTO: 29.5 PG (ref 26.5–33)
MCHC RBC AUTO-ENTMCNC: 32.1 G/DL (ref 31.5–36.5)
MCV RBC AUTO: 92 FL (ref 78–100)
PHOSPHATE SERPL-MCNC: 3.5 MG/DL (ref 2.5–4.5)
PLATELET # BLD AUTO: 159 10E3/UL (ref 150–450)
POTASSIUM BLD-SCNC: 4.3 MMOL/L (ref 3.5–5)
PROT SERPL-MCNC: 6.7 G/DL (ref 6–8)
RBC # BLD AUTO: 3.49 10E6/UL (ref 4.4–5.9)
SODIUM SERPL-SCNC: 139 MMOL/L (ref 136–145)
TACROLIMUS BLD-MCNC: 6.1 UG/L (ref 5–15)
TME LAST DOSE: NORMAL H
TME LAST DOSE: NORMAL H
WBC # BLD AUTO: 4.9 10E3/UL (ref 4–11)

## 2023-06-13 PROCEDURE — 84100 ASSAY OF PHOSPHORUS: CPT

## 2023-06-13 PROCEDURE — 80053 COMPREHEN METABOLIC PANEL: CPT

## 2023-06-13 PROCEDURE — 80197 ASSAY OF TACROLIMUS: CPT

## 2023-06-13 PROCEDURE — 82248 BILIRUBIN DIRECT: CPT

## 2023-06-13 PROCEDURE — 83735 ASSAY OF MAGNESIUM: CPT

## 2023-06-13 PROCEDURE — 36415 COLL VENOUS BLD VENIPUNCTURE: CPT

## 2023-06-13 PROCEDURE — 85027 COMPLETE CBC AUTOMATED: CPT

## 2023-06-20 ENCOUNTER — LAB (OUTPATIENT)
Dept: LAB | Facility: CLINIC | Age: 28
End: 2023-06-20
Payer: COMMERCIAL

## 2023-06-20 DIAGNOSIS — Z94.4 LIVER REPLACED BY TRANSPLANT (H): ICD-10-CM

## 2023-06-20 LAB
ALBUMIN SERPL-MCNC: 3.7 G/DL (ref 3.5–5)
ALP SERPL-CCNC: 247 U/L (ref 45–120)
ALT SERPL W P-5'-P-CCNC: 23 U/L (ref 0–45)
ANION GAP SERPL CALCULATED.3IONS-SCNC: 9 MMOL/L (ref 5–18)
AST SERPL W P-5'-P-CCNC: 23 U/L (ref 0–40)
BILIRUB DIRECT SERPL-MCNC: 0.3 MG/DL
BILIRUB SERPL-MCNC: 1 MG/DL (ref 0–1)
BUN SERPL-MCNC: 24 MG/DL (ref 8–22)
CALCIUM SERPL-MCNC: 10 MG/DL (ref 8.5–10.5)
CHLORIDE BLD-SCNC: 98 MMOL/L (ref 98–107)
CO2 SERPL-SCNC: 27 MMOL/L (ref 22–31)
CREAT SERPL-MCNC: 1.02 MG/DL (ref 0.7–1.3)
ERYTHROCYTE [DISTWIDTH] IN BLOOD BY AUTOMATED COUNT: 14.6 % (ref 10–15)
GFR SERPL CREATININE-BSD FRML MDRD: >90 ML/MIN/1.73M2
GLUCOSE BLD-MCNC: 298 MG/DL (ref 70–125)
HCT VFR BLD AUTO: 32.6 % (ref 40–53)
HGB BLD-MCNC: 11 G/DL (ref 13.3–17.7)
MAGNESIUM SERPL-MCNC: 1.5 MG/DL (ref 1.8–2.6)
MCH RBC QN AUTO: 30.5 PG (ref 26.5–33)
MCHC RBC AUTO-ENTMCNC: 33.7 G/DL (ref 31.5–36.5)
MCV RBC AUTO: 90 FL (ref 78–100)
PHOSPHATE SERPL-MCNC: 3.2 MG/DL (ref 2.5–4.5)
PLATELET # BLD AUTO: 182 10E3/UL (ref 150–450)
POTASSIUM BLD-SCNC: 5.5 MMOL/L (ref 3.5–5)
PROT SERPL-MCNC: 6.8 G/DL (ref 6–8)
RBC # BLD AUTO: 3.61 10E6/UL (ref 4.4–5.9)
SODIUM SERPL-SCNC: 134 MMOL/L (ref 136–145)
WBC # BLD AUTO: 5.2 10E3/UL (ref 4–11)

## 2023-06-20 PROCEDURE — 83735 ASSAY OF MAGNESIUM: CPT

## 2023-06-20 PROCEDURE — 80197 ASSAY OF TACROLIMUS: CPT

## 2023-06-20 PROCEDURE — 82248 BILIRUBIN DIRECT: CPT

## 2023-06-20 PROCEDURE — 84100 ASSAY OF PHOSPHORUS: CPT

## 2023-06-20 PROCEDURE — 85027 COMPLETE CBC AUTOMATED: CPT

## 2023-06-20 PROCEDURE — 36415 COLL VENOUS BLD VENIPUNCTURE: CPT

## 2023-06-21 ENCOUNTER — TELEPHONE (OUTPATIENT)
Dept: TRANSPLANT | Facility: CLINIC | Age: 28
End: 2023-06-21

## 2023-06-21 DIAGNOSIS — Z94.4 LIVER REPLACED BY TRANSPLANT (H): ICD-10-CM

## 2023-06-21 DIAGNOSIS — E83.42 HYPOMAGNESEMIA: Primary | ICD-10-CM

## 2023-06-21 LAB
TACROLIMUS BLD-MCNC: 7.8 UG/L (ref 5–15)
TME LAST DOSE: NORMAL H
TME LAST DOSE: NORMAL H

## 2023-06-21 NOTE — TELEPHONE ENCOUNTER
Magnesium levels have ranged from 1.3-1.5 over last 2 months on 800 mg BID. Increase dose to 800 mg TID.   Call to Leticia to confirm Dillon is actually taking magnesium supplement twice a day. Left message and sent MyC message.     Leticia called back. She stated that Dillon has really only been taking the magnesium oxide once a day, sometimes twice. She said that within an hour or two, he has to run to the bathroom with diarrhea. Discussed changing magnesium formulation to Doctor's Best as it is sometimes absorbed better, Leticia was amenable to trying it. Magnesium oxide discontinued and order for magnesium glycinate lysinate chelate sent to  mail order pharmacy.   Leticia will make sure that Dillon is taking magnesium supplement twice per day. Reviewed need to separate from tac by 2 hours.

## 2023-06-28 ENCOUNTER — LAB (OUTPATIENT)
Dept: LAB | Facility: CLINIC | Age: 28
End: 2023-06-28
Payer: COMMERCIAL

## 2023-06-28 DIAGNOSIS — Z94.4 LIVER REPLACED BY TRANSPLANT (H): ICD-10-CM

## 2023-06-28 LAB
ALBUMIN SERPL-MCNC: 3.5 G/DL (ref 3.5–5)
ALP SERPL-CCNC: 183 U/L (ref 45–120)
ALT SERPL W P-5'-P-CCNC: 21 U/L (ref 0–45)
ANION GAP SERPL CALCULATED.3IONS-SCNC: 8 MMOL/L (ref 5–18)
AST SERPL W P-5'-P-CCNC: 22 U/L (ref 0–40)
BILIRUB DIRECT SERPL-MCNC: 0.3 MG/DL
BILIRUB SERPL-MCNC: 0.9 MG/DL (ref 0–1)
BUN SERPL-MCNC: 32 MG/DL (ref 8–22)
CALCIUM SERPL-MCNC: 9.6 MG/DL (ref 8.5–10.5)
CHLORIDE BLD-SCNC: 98 MMOL/L (ref 98–107)
CO2 SERPL-SCNC: 29 MMOL/L (ref 22–31)
CREAT SERPL-MCNC: 1.35 MG/DL (ref 0.7–1.3)
ERYTHROCYTE [DISTWIDTH] IN BLOOD BY AUTOMATED COUNT: 14.7 % (ref 10–15)
GFR SERPL CREATININE-BSD FRML MDRD: 73 ML/MIN/1.73M2
GLUCOSE BLD-MCNC: 352 MG/DL (ref 70–125)
HCT VFR BLD AUTO: 31.7 % (ref 40–53)
HGB BLD-MCNC: 10.4 G/DL (ref 13.3–17.7)
MAGNESIUM SERPL-MCNC: 1.7 MG/DL (ref 1.8–2.6)
MCH RBC QN AUTO: 30 PG (ref 26.5–33)
MCHC RBC AUTO-ENTMCNC: 32.8 G/DL (ref 31.5–36.5)
MCV RBC AUTO: 91 FL (ref 78–100)
PHOSPHATE SERPL-MCNC: 4.4 MG/DL (ref 2.5–4.5)
PLATELET # BLD AUTO: 161 10E3/UL (ref 150–450)
POTASSIUM BLD-SCNC: 5.2 MMOL/L (ref 3.5–5)
PROT SERPL-MCNC: 6.5 G/DL (ref 6–8)
RBC # BLD AUTO: 3.47 10E6/UL (ref 4.4–5.9)
SODIUM SERPL-SCNC: 135 MMOL/L (ref 136–145)
WBC # BLD AUTO: 3.9 10E3/UL (ref 4–11)

## 2023-06-28 PROCEDURE — 82248 BILIRUBIN DIRECT: CPT

## 2023-06-28 PROCEDURE — 85027 COMPLETE CBC AUTOMATED: CPT

## 2023-06-28 PROCEDURE — 80053 COMPREHEN METABOLIC PANEL: CPT

## 2023-06-28 PROCEDURE — 84100 ASSAY OF PHOSPHORUS: CPT

## 2023-06-28 PROCEDURE — 36415 COLL VENOUS BLD VENIPUNCTURE: CPT

## 2023-06-28 PROCEDURE — 83735 ASSAY OF MAGNESIUM: CPT

## 2023-06-28 PROCEDURE — 80197 ASSAY OF TACROLIMUS: CPT

## 2023-06-29 ENCOUNTER — TELEPHONE (OUTPATIENT)
Dept: TRANSPLANT | Facility: CLINIC | Age: 28
End: 2023-06-29
Payer: COMMERCIAL

## 2023-06-29 DIAGNOSIS — Z94.4 LIVER REPLACED BY TRANSPLANT (H): ICD-10-CM

## 2023-06-29 LAB
TACROLIMUS BLD-MCNC: 11.2 UG/L (ref 5–15)
TME LAST DOSE: NORMAL H
TME LAST DOSE: NORMAL H

## 2023-06-29 NOTE — TELEPHONE ENCOUNTER
ISSUE:   Tacrolimus IR level 11.2 on 6/28, goal 8-10, dose 3 mg BID.    PLAN:   Please call patient and confirm this was an accurate 12-hour trough. Verify Tacrolimus IR dose 3 mg BID. Confirm no new medications or illness. Confirm no missed doses. If accurate trough and accurate dose, decrease Tacrolimus IR dose to 3 mg AM, 2 mg PM and repeat labs in 2 weeks    OUTCOME:   Spoke with patient, they confirm accurate trough level and current dose 3 mg BID. Patient confirmed dose change to 3 mg AM, 2 mg PM and to repeat labs in 2 weeks. Orders sent to preferred pharmacy for dose change and lab for repeat labs. Patient voiced understanding of plan.

## 2023-06-30 RX ORDER — TACROLIMUS 1 MG/1
CAPSULE ORAL
Qty: 450 CAPSULE | Refills: 3 | Status: SHIPPED | OUTPATIENT
Start: 2023-06-30 | End: 2023-07-21

## 2023-07-12 ENCOUNTER — TELEPHONE (OUTPATIENT)
Dept: PHARMACY | Facility: CLINIC | Age: 28
End: 2023-07-12
Payer: COMMERCIAL

## 2023-07-12 ENCOUNTER — TELEPHONE (OUTPATIENT)
Dept: TRANSPLANT | Facility: CLINIC | Age: 28
End: 2023-07-12
Payer: COMMERCIAL

## 2023-07-12 NOTE — TELEPHONE ENCOUNTER
Called Leticia back. She just wanted to touch base and let me know Dillon will get his labs drawn tomorrow afternoon. Reviewed upcoming appointments.

## 2023-07-12 NOTE — TELEPHONE ENCOUNTER
Clinical Pharmacy Consult:                                                      Transplant Specific: 3 month post transplant medication review  Date of Transplant: 2/28/23  Type of Transplant: liver  First Transplant: yes  History of rejection: no    Immunosuppression Regimen   TAC 3mg qAM & 2mg qPM, Prednisone 5mg qAM and MMF 750mg qAM & 750mg qPM  Patient specific goal: 8-10  Most recent level: 11.2, date 6/28/23  Immunosuppressant Levels:  Supratherapeutic - dose decreased  Pt adherent to lab draws: yes  Scr:   Lab Results   Component Value Date    CR 1.09 06/01/2023     Side effects: none reported    Prophylactic Medications  Antibacterial:  Bactrim ss 3 times a week  Scheduled Discontinue Date: 6 months    Antifungal: Not needed thus far  Scheduled Discontinue Date: N/A    Antiviral: CrCl 40 to 59 mL/minute: Valcyte 450 mg once daily   Scheduled Discontinue Date: Therapy Complete    Acid Reducer: Protonix (pantoprazole)  Scheduled Reviewed Date:      Thrombosis Prevention: Aspirin 81 mg PO daily  Scheduled Discontinue Date: managed by clinic    Blood Pressure Management  Frequency of home Blood Pressure checks: Couple times a week    Most recent home BP: 135/70     Patient Blood pressure goal:  140/90  Patient blood pressure at goal: yes       Hospitalizations/ER visits since last assessment: 0        Med rec/DUR performed: yes  Med Rec Discrepancies: no      Spoke with Dillon's mom, Leticia. Dillon is doing well with no side effects to report. Pill boxes are being used and medication card is up to date. Leticia reported that there is an occasional missed dose but for the most part Dillon is adherent with his medications.    BP is checked a couple times a week at home and is checked frequently in clinic. Last tacro level was a little high and dose was decreased.    Next follow up in 3 months.    Angela Quintero, [facility]

## 2023-07-12 NOTE — TELEPHONE ENCOUNTER
Leticia patient's mother would like to speak with Coordinator regarding labs, and just to catch up

## 2023-07-13 ENCOUNTER — LAB (OUTPATIENT)
Dept: LAB | Facility: CLINIC | Age: 28
End: 2023-07-13
Payer: COMMERCIAL

## 2023-07-13 DIAGNOSIS — Z94.4 LIVER REPLACED BY TRANSPLANT (H): ICD-10-CM

## 2023-07-13 DIAGNOSIS — K70.31 ALCOHOLIC CIRRHOSIS OF LIVER WITH ASCITES (H): Chronic | ICD-10-CM

## 2023-07-13 DIAGNOSIS — Z94.4 LIVER REPLACED BY TRANSPLANT (H): Primary | ICD-10-CM

## 2023-07-13 LAB
ALBUMIN SERPL-MCNC: 3.7 G/DL (ref 3.5–5)
ALP SERPL-CCNC: 183 U/L (ref 45–120)
ALT SERPL W P-5'-P-CCNC: 23 U/L (ref 0–45)
ANION GAP SERPL CALCULATED.3IONS-SCNC: 8 MMOL/L (ref 5–18)
AST SERPL W P-5'-P-CCNC: 26 U/L (ref 0–40)
BASOPHILS # BLD AUTO: 0 10E3/UL (ref 0–0.2)
BASOPHILS NFR BLD AUTO: 1 %
BILIRUB DIRECT SERPL-MCNC: 0.3 MG/DL
BILIRUB SERPL-MCNC: 0.9 MG/DL (ref 0–1)
BUN SERPL-MCNC: 23 MG/DL (ref 8–22)
CALCIUM SERPL-MCNC: 9.9 MG/DL (ref 8.5–10.5)
CHLORIDE BLD-SCNC: 98 MMOL/L (ref 98–107)
CO2 SERPL-SCNC: 31 MMOL/L (ref 22–31)
CREAT SERPL-MCNC: 1.16 MG/DL (ref 0.7–1.3)
EOSINOPHIL # BLD AUTO: 0.1 10E3/UL (ref 0–0.7)
EOSINOPHIL NFR BLD AUTO: 3 %
ERYTHROCYTE [DISTWIDTH] IN BLOOD BY AUTOMATED COUNT: 13.7 % (ref 10–15)
GFR SERPL CREATININE-BSD FRML MDRD: 87 ML/MIN/1.73M2
GLUCOSE BLD-MCNC: 265 MG/DL (ref 70–125)
HCT VFR BLD AUTO: 34.9 % (ref 40–53)
HGB BLD-MCNC: 11.2 G/DL (ref 13.3–17.7)
IMM GRANULOCYTES # BLD: 0 10E3/UL
IMM GRANULOCYTES NFR BLD: 2 %
LYMPHOCYTES # BLD AUTO: 0.5 10E3/UL (ref 0.8–5.3)
LYMPHOCYTES NFR BLD AUTO: 27 %
MAGNESIUM SERPL-MCNC: 1.6 MG/DL (ref 1.8–2.6)
MCH RBC QN AUTO: 29.3 PG (ref 26.5–33)
MCHC RBC AUTO-ENTMCNC: 32.1 G/DL (ref 31.5–36.5)
MCV RBC AUTO: 91 FL (ref 78–100)
MONOCYTES # BLD AUTO: 0.2 10E3/UL (ref 0–1.3)
MONOCYTES NFR BLD AUTO: 8 %
NEUTROPHILS # BLD AUTO: 1.1 10E3/UL (ref 1.6–8.3)
NEUTROPHILS NFR BLD AUTO: 59 %
PHOSPHATE SERPL-MCNC: 3.2 MG/DL (ref 2.5–4.5)
PLATELET # BLD AUTO: 152 10E3/UL (ref 150–450)
POTASSIUM BLD-SCNC: 4.8 MMOL/L (ref 3.5–5)
PROT SERPL-MCNC: 6.8 G/DL (ref 6–8)
RBC # BLD AUTO: 3.82 10E6/UL (ref 4.4–5.9)
SODIUM SERPL-SCNC: 137 MMOL/L (ref 136–145)
WBC # BLD AUTO: 1.9 10E3/UL (ref 4–11)
WBC # BLD AUTO: 1.9 10E3/UL (ref 4–11)

## 2023-07-13 PROCEDURE — 85025 COMPLETE CBC W/AUTO DIFF WBC: CPT

## 2023-07-13 PROCEDURE — 83735 ASSAY OF MAGNESIUM: CPT

## 2023-07-13 PROCEDURE — 84100 ASSAY OF PHOSPHORUS: CPT

## 2023-07-13 PROCEDURE — 80321 ALCOHOLS BIOMARKERS 1OR 2: CPT

## 2023-07-13 PROCEDURE — 36415 COLL VENOUS BLD VENIPUNCTURE: CPT

## 2023-07-13 PROCEDURE — 80053 COMPREHEN METABOLIC PANEL: CPT

## 2023-07-13 PROCEDURE — 82248 BILIRUBIN DIRECT: CPT

## 2023-07-13 PROCEDURE — 80197 ASSAY OF TACROLIMUS: CPT

## 2023-07-13 NOTE — PROGRESS NOTES
WBC 1.9 today. Differential added on to today's draw and order placed for standing differential with future lab draws.

## 2023-07-14 LAB
TACROLIMUS BLD-MCNC: 8.1 UG/L (ref 5–15)
TME LAST DOSE: NORMAL H
TME LAST DOSE: NORMAL H

## 2023-07-16 LAB
LABORATORY REPORT: NORMAL
PLPETH BLD-MCNC: <10 NG/ML
POPETH BLD-MCNC: <10 NG/ML

## 2023-07-19 ENCOUNTER — TELEPHONE (OUTPATIENT)
Dept: TRANSPLANT | Facility: CLINIC | Age: 28
End: 2023-07-19
Payer: COMMERCIAL

## 2023-07-19 DIAGNOSIS — Z94.4 LIVER REPLACED BY TRANSPLANT (H): ICD-10-CM

## 2023-07-19 RX ORDER — MYCOPHENOLATE MOFETIL 250 MG/1
500 CAPSULE ORAL 2 TIMES DAILY
Qty: 120 CAPSULE | Refills: 0 | Status: SHIPPED | OUTPATIENT
Start: 2023-07-19 | End: 2023-08-03

## 2023-07-19 NOTE — TELEPHONE ENCOUNTER
Initiating MMF wean.   Call to Leticia to decrease MMF to 500 mg BID. Repeat labs in 1 week. Leticia is agreeable to plan.

## 2023-07-21 ENCOUNTER — OFFICE VISIT (OUTPATIENT)
Dept: GASTROENTEROLOGY | Facility: CLINIC | Age: 28
End: 2023-07-21
Attending: SURGERY
Payer: COMMERCIAL

## 2023-07-21 ENCOUNTER — LAB (OUTPATIENT)
Dept: LAB | Facility: CLINIC | Age: 28
End: 2023-07-21
Attending: SURGERY
Payer: COMMERCIAL

## 2023-07-21 ENCOUNTER — ANCILLARY PROCEDURE (OUTPATIENT)
Dept: GENERAL RADIOLOGY | Facility: CLINIC | Age: 28
End: 2023-07-21
Attending: SURGERY
Payer: COMMERCIAL

## 2023-07-21 ENCOUNTER — TELEPHONE (OUTPATIENT)
Dept: TRANSPLANT | Facility: CLINIC | Age: 28
End: 2023-07-21

## 2023-07-21 VITALS
HEART RATE: 76 BPM | BODY MASS INDEX: 25.29 KG/M2 | DIASTOLIC BLOOD PRESSURE: 108 MMHG | WEIGHT: 152 LBS | SYSTOLIC BLOOD PRESSURE: 145 MMHG | OXYGEN SATURATION: 100 %

## 2023-07-21 DIAGNOSIS — Z94.4 LIVER REPLACED BY TRANSPLANT (H): ICD-10-CM

## 2023-07-21 DIAGNOSIS — Z94.4 LIVER REPLACED BY TRANSPLANT (H): Primary | ICD-10-CM

## 2023-07-21 LAB
ALBUMIN SERPL BCG-MCNC: 4 G/DL (ref 3.5–5.2)
ALP SERPL-CCNC: 187 U/L (ref 40–129)
ALT SERPL W P-5'-P-CCNC: 19 U/L (ref 0–70)
ANION GAP SERPL CALCULATED.3IONS-SCNC: 9 MMOL/L (ref 7–15)
AST SERPL W P-5'-P-CCNC: 24 U/L (ref 0–45)
BASOPHILS # BLD AUTO: 0 10E3/UL (ref 0–0.2)
BASOPHILS NFR BLD AUTO: 1 %
BILIRUB DIRECT SERPL-MCNC: 0.24 MG/DL (ref 0–0.3)
BILIRUB SERPL-MCNC: 0.6 MG/DL
BUN SERPL-MCNC: 26.1 MG/DL (ref 6–20)
CALCIUM SERPL-MCNC: 10 MG/DL (ref 8.6–10)
CHLORIDE SERPL-SCNC: 101 MMOL/L (ref 98–107)
CREAT SERPL-MCNC: 1.09 MG/DL (ref 0.67–1.17)
DEPRECATED HCO3 PLAS-SCNC: 29 MMOL/L (ref 22–29)
EOSINOPHIL # BLD AUTO: 0.1 10E3/UL (ref 0–0.7)
EOSINOPHIL NFR BLD AUTO: 3 %
ERYTHROCYTE [DISTWIDTH] IN BLOOD BY AUTOMATED COUNT: 13.2 % (ref 10–15)
GFR SERPL CREATININE-BSD FRML MDRD: >90 ML/MIN/1.73M2
GLUCOSE SERPL-MCNC: 291 MG/DL (ref 70–99)
HCT VFR BLD AUTO: 33 % (ref 40–53)
HGB BLD-MCNC: 10.7 G/DL (ref 13.3–17.7)
IMM GRANULOCYTES # BLD: 0 10E3/UL
IMM GRANULOCYTES NFR BLD: 2 %
LYMPHOCYTES # BLD AUTO: 0.7 10E3/UL (ref 0.8–5.3)
LYMPHOCYTES NFR BLD AUTO: 40 %
MAGNESIUM SERPL-MCNC: 1.8 MG/DL (ref 1.7–2.3)
MCH RBC QN AUTO: 29.3 PG (ref 26.5–33)
MCHC RBC AUTO-ENTMCNC: 32.4 G/DL (ref 31.5–36.5)
MCV RBC AUTO: 90 FL (ref 78–100)
MONOCYTES # BLD AUTO: 0.3 10E3/UL (ref 0–1.3)
MONOCYTES NFR BLD AUTO: 14 %
NEUTROPHILS # BLD AUTO: 0.8 10E3/UL (ref 1.6–8.3)
NEUTROPHILS NFR BLD AUTO: 40 %
NRBC # BLD AUTO: 0 10E3/UL
NRBC BLD AUTO-RTO: 0 /100
PHOSPHATE SERPL-MCNC: 3.4 MG/DL (ref 2.5–4.5)
PLATELET # BLD AUTO: 147 10E3/UL (ref 150–450)
POTASSIUM SERPL-SCNC: 4.4 MMOL/L (ref 3.4–5.3)
PROT SERPL-MCNC: 6.8 G/DL (ref 6.4–8.3)
RBC # BLD AUTO: 3.65 10E6/UL (ref 4.4–5.9)
SODIUM SERPL-SCNC: 139 MMOL/L (ref 136–145)
TACROLIMUS BLD-MCNC: 10.7 UG/L (ref 5–15)
TME LAST DOSE: NORMAL H
TME LAST DOSE: NORMAL H
WBC # BLD AUTO: 1.9 10E3/UL (ref 4–11)
WBC # BLD AUTO: 1.9 10E3/UL (ref 4–11)

## 2023-07-21 PROCEDURE — 85025 COMPLETE CBC W/AUTO DIFF WBC: CPT | Performed by: PATHOLOGY

## 2023-07-21 PROCEDURE — G0463 HOSPITAL OUTPT CLINIC VISIT: HCPCS | Performed by: INTERNAL MEDICINE

## 2023-07-21 PROCEDURE — 74019 RADEX ABDOMEN 2 VIEWS: CPT | Mod: GC | Performed by: RADIOLOGY

## 2023-07-21 PROCEDURE — 80197 ASSAY OF TACROLIMUS: CPT | Performed by: SURGERY

## 2023-07-21 PROCEDURE — 84100 ASSAY OF PHOSPHORUS: CPT | Performed by: PATHOLOGY

## 2023-07-21 PROCEDURE — 83735 ASSAY OF MAGNESIUM: CPT | Performed by: PATHOLOGY

## 2023-07-21 PROCEDURE — 82248 BILIRUBIN DIRECT: CPT | Performed by: PATHOLOGY

## 2023-07-21 PROCEDURE — 99000 SPECIMEN HANDLING OFFICE-LAB: CPT | Performed by: PATHOLOGY

## 2023-07-21 PROCEDURE — 36415 COLL VENOUS BLD VENIPUNCTURE: CPT | Performed by: PATHOLOGY

## 2023-07-21 PROCEDURE — 99215 OFFICE O/P EST HI 40 MIN: CPT | Performed by: INTERNAL MEDICINE

## 2023-07-21 PROCEDURE — 80053 COMPREHEN METABOLIC PANEL: CPT | Performed by: PATHOLOGY

## 2023-07-21 RX ORDER — TACROLIMUS 1 MG/1
2 CAPSULE ORAL 2 TIMES DAILY
Qty: 360 CAPSULE | Refills: 3 | Status: SHIPPED | OUTPATIENT
Start: 2023-07-21 | End: 2023-11-17

## 2023-07-21 ASSESSMENT — PAIN SCALES - GENERAL: PAINLEVEL: NO PAIN (0)

## 2023-07-21 NOTE — PROGRESS NOTES
HISTORY OF PRESENT ILLNESS:  I had the pleasure of seeing Dillon Salter for followup in the Liver Transplant Clinic at the [facility] on 07/21/2023.  Mr. Salter returns for followup now 6 months status post liver transplantation for alcoholic cirrhosis.    His posttransplant course was complicated by development of a biliary stricture.  He has had ERCPs and stenting and is due for followup on Monday.  He also did have acute kidney injury and renal insufficiency, which has resolved.  For that reason, his tacrolimus was run low, and he was on low-dose prednisone and is presently tapering off his mycophenolate mofetil.    At this point in time, he feels well.  He denies any abdominal pain, itching, skin rash, or fatigue.  He denies any increased abdominal girth or lower extremity edema.    He denies any fevers or chills, cough or shortness of breath.  He denies any nausea or vomiting, diarrhea or constipation.  He reports his appetite is improving and his weight has come up as well.    Current Outpatient Medications   Medication      magnesium glycinate lysinate chelate (DOCTOR'S BEST) 100 MG TABS tablet     acetaminophen (TYLENOL) 325 MG tablet     FLUoxetine (PROZAC) 20 MG capsule     insulin glargine (LANTUS PEN) 100 UNIT/ML pen     metoprolol tartrate (LOPRESSOR) 25 MG tablet     multivitamin w/minerals (THERA-VIT-M) tablet     mycophenolate (GENERIC EQUIVALENT) 250 MG capsule     oxyCODONE (ROXICODONE) 5 MG tablet     pantoprazole (PROTONIX) 40 MG EC tablet     predniSONE (DELTASONE) 5 MG tablet     sulfamethoxazole-trimethoprim (BACTRIM) 400-80 MG tablet     tacrolimus (GENERIC EQUIVALENT) 0.5 MG capsule     tacrolimus (GENERIC EQUIVALENT) 1 MG capsule     ursodiol (ACTIGALL) 300 MG capsule     Vitamin D3 (CHOLECALCIFEROL) 25 mcg (1000 units) tablet     aspirin (ASA) 81 MG EC tablet     gabapentin (NEURONTIN) 100 MG capsule     insulin aspart (NOVOLOG FLEXPEN) 100 UNIT/ML pen     No  current facility-administered medications for this visit.     BP (!) 145/108   Pulse 76   Wt 68.9 kg (152 lb)   SpO2 100%   BMI 25.29 kg/m      PHYSICAL EXAMINATION:    GENERAL:  He looks quite well.  HEENT:  No scleral icterus or temporal muscle wasting.  CHEST:  Clear.  ABDOMEN:  No increase in girth.  No masses or tenderness to palpation is present.  His liver is 10 cm in span without left lobe enlargement.  No spleen tip is palpable.  EXTREMITIES:  No edema.  SKIN:  No stigmata of chronic liver disease or suspicious lesions.  NEUROLOGIC:  Nonfocal.    Recent Results (from the past 168 hour(s))   Phosphorus    Collection Time: 07/21/23  8:02 AM   Result Value Ref Range    Phosphorus 3.4 2.5 - 4.5 mg/dL   Magnesium    Collection Time: 07/21/23  8:02 AM   Result Value Ref Range    Magnesium 1.8 1.7 - 2.3 mg/dL   Hepatic panel    Collection Time: 07/21/23  8:02 AM   Result Value Ref Range    Protein Total 6.8 6.4 - 8.3 g/dL    Albumin 4.0 3.5 - 5.2 g/dL    Bilirubin Total 0.6 <=1.2 mg/dL    Alkaline Phosphatase 187 (H) 40 - 129 U/L    AST 24 0 - 45 U/L    ALT 19 0 - 70 U/L    Bilirubin Direct 0.24 0.00 - 0.30 mg/dL   CBC with platelets    Collection Time: 07/21/23  8:02 AM   Result Value Ref Range    WBC Count 1.9 (L) 4.0 - 11.0 10e3/uL    RBC Count 3.65 (L) 4.40 - 5.90 10e6/uL    Hemoglobin 10.7 (L) 13.3 - 17.7 g/dL    Hematocrit 33.0 (L) 40.0 - 53.0 %    MCV 90 78 - 100 fL    MCH 29.3 26.5 - 33.0 pg    MCHC 32.4 31.5 - 36.5 g/dL    RDW 13.2 10.0 - 15.0 %    Platelet Count 147 (L) 150 - 450 10e3/uL   Basic metabolic panel    Collection Time: 07/21/23  8:02 AM   Result Value Ref Range    Sodium 139 136 - 145 mmol/L    Potassium 4.4 3.4 - 5.3 mmol/L    Chloride 101 98 - 107 mmol/L    Carbon Dioxide (CO2) 29 22 - 29 mmol/L    Anion Gap 9 7 - 15 mmol/L    Urea Nitrogen 26.1 (H) 6.0 - 20.0 mg/dL    Creatinine 1.09 0.67 - 1.17 mg/dL    Calcium 10.0 8.6 - 10.0 mg/dL    Glucose 291 (H) 70 - 99 mg/dL    GFR Estimate >90  >60 mL/min/1.73m2   WBC and Differential    Collection Time: 07/21/23  8:02 AM   Result Value Ref Range    WBC Count 1.9 (L) 4.0 - 11.0 10e3/uL    % Neutrophils 40 %    % Lymphocytes 40 %    % Monocytes 14 %    % Eosinophils 3 %    % Basophils 1 %    % Immature Granulocytes 2 %    NRBCs per 100 WBC 0 <1 /100    Absolute Neutrophils 0.8 (L) 1.6 - 8.3 10e3/uL    Absolute Lymphocytes 0.7 (L) 0.8 - 5.3 10e3/uL    Absolute Monocytes 0.3 0.0 - 1.3 10e3/uL    Absolute Eosinophils 0.1 0.0 - 0.7 10e3/uL    Absolute Basophils 0.0 0.0 - 0.2 10e3/uL    Absolute Immature Granulocytes 0.0 <=0.4 10e3/uL    Absolute NRBCs 0.0 10e3/uL      IMPRESSION:  Mr. Salter is 6 months status post liver transplantation and really doing quite well.  We will wean him off CellCept over the next 6 weeks.  We then will go ahead and wean him off prednisone so we will be able to maintain his tacrolimus level at a higher dose given how good his kidney function is at this point in time.    I did tell him to start watching his weight gain, as certainly this is a time when people tend to gain a fair amount of weight.  Otherwise, all other complications are well addressed, and I will see him back in the clinic in 3 months.    I did spend a total of 40 minutes (on the date of the encounter), including 30 minutes of face-to-face clinic time including counseling. The rest of the time was spent in documentation and review of records.     Thank you very much for allowing me to participate in the care of this patient.  If you have any questions regarding recommendations, please do not hesitate to contact me.         Sergio Wood MD      Professor of Medicine  [employer]      Executive Medical Director, Solid Organ Transplant Program  [facility]

## 2023-07-21 NOTE — LETTER
7/21/2023         RE: Dillon Salter  [address] [address]        Dear Colleague,    Thank you for referring your patient, Dillon Salter, to the [facility] [place]. Please see a copy of my visit note below.    HISTORY OF PRESENT ILLNESS:  I had the pleasure of seeing Dillon Salter for followup in the Liver Transplant Clinic at the [facility] on 07/21/2023.  Mr. Salter returns for followup now 6 months status post liver transplantation for alcoholic cirrhosis.    His posttransplant course was complicated by development of a biliary stricture.  He has had ERCPs and stenting and is due for followup on Monday.  He also did have acute kidney injury and renal insufficiency, which has resolved.  For that reason, his tacrolimus was run low, and he was on low-dose prednisone and is presently tapering off his mycophenolate mofetil.    At this point in time, he feels well.  He denies any abdominal pain, itching, skin rash, or fatigue.  He denies any increased abdominal girth or lower extremity edema.    He denies any fevers or chills, cough or shortness of breath.  He denies any nausea or vomiting, diarrhea or constipation.  He reports his appetite is improving and his weight has come up as well.    Current Outpatient Medications   Medication     magnesium glycinate lysinate chelate (DOCTOR'S BEST) 100 MG TABS tablet    acetaminophen (TYLENOL) 325 MG tablet    FLUoxetine (PROZAC) 20 MG capsule    insulin glargine (LANTUS PEN) 100 UNIT/ML pen    metoprolol tartrate (LOPRESSOR) 25 MG tablet    multivitamin w/minerals (THERA-VIT-M) tablet    mycophenolate (GENERIC EQUIVALENT) 250 MG capsule    oxyCODONE (ROXICODONE) 5 MG tablet    pantoprazole (PROTONIX) 40 MG EC tablet    predniSONE (DELTASONE) 5 MG tablet    sulfamethoxazole-trimethoprim (BACTRIM) 400-80 MG tablet    tacrolimus (GENERIC EQUIVALENT) 0.5 MG capsule    tacrolimus (GENERIC  EQUIVALENT) 1 MG capsule    ursodiol (ACTIGALL) 300 MG capsule    Vitamin D3 (CHOLECALCIFEROL) 25 mcg (1000 units) tablet    aspirin (ASA) 81 MG EC tablet    gabapentin (NEURONTIN) 100 MG capsule    insulin aspart (NOVOLOG FLEXPEN) 100 UNIT/ML pen     No current facility-administered medications for this visit.     BP (!) 145/108   Pulse 76   Wt 68.9 kg (152 lb)   SpO2 100%   BMI 25.29 kg/m      PHYSICAL EXAMINATION:    GENERAL:  He looks quite well.  HEENT:  No scleral icterus or temporal muscle wasting.  CHEST:  Clear.  ABDOMEN:  No increase in girth.  No masses or tenderness to palpation is present.  His liver is 10 cm in span without left lobe enlargement.  No spleen tip is palpable.  EXTREMITIES:  No edema.  SKIN:  No stigmata of chronic liver disease or suspicious lesions.  NEUROLOGIC:  Nonfocal.    Recent Results (from the past 168 hour(s))   Phosphorus    Collection Time: 07/21/23  8:02 AM   Result Value Ref Range    Phosphorus 3.4 2.5 - 4.5 mg/dL   Magnesium    Collection Time: 07/21/23  8:02 AM   Result Value Ref Range    Magnesium 1.8 1.7 - 2.3 mg/dL   Hepatic panel    Collection Time: 07/21/23  8:02 AM   Result Value Ref Range    Protein Total 6.8 6.4 - 8.3 g/dL    Albumin 4.0 3.5 - 5.2 g/dL    Bilirubin Total 0.6 <=1.2 mg/dL    Alkaline Phosphatase 187 (H) 40 - 129 U/L    AST 24 0 - 45 U/L    ALT 19 0 - 70 U/L    Bilirubin Direct 0.24 0.00 - 0.30 mg/dL   CBC with platelets    Collection Time: 07/21/23  8:02 AM   Result Value Ref Range    WBC Count 1.9 (L) 4.0 - 11.0 10e3/uL    RBC Count 3.65 (L) 4.40 - 5.90 10e6/uL    Hemoglobin 10.7 (L) 13.3 - 17.7 g/dL    Hematocrit 33.0 (L) 40.0 - 53.0 %    MCV 90 78 - 100 fL    MCH 29.3 26.5 - 33.0 pg    MCHC 32.4 31.5 - 36.5 g/dL    RDW 13.2 10.0 - 15.0 %    Platelet Count 147 (L) 150 - 450 10e3/uL   Basic metabolic panel    Collection Time: 07/21/23  8:02 AM   Result Value Ref Range    Sodium 139 136 - 145 mmol/L    Potassium 4.4 3.4 - 5.3 mmol/L    Chloride  101 98 - 107 mmol/L    Carbon Dioxide (CO2) 29 22 - 29 mmol/L    Anion Gap 9 7 - 15 mmol/L    Urea Nitrogen 26.1 (H) 6.0 - 20.0 mg/dL    Creatinine 1.09 0.67 - 1.17 mg/dL    Calcium 10.0 8.6 - 10.0 mg/dL    Glucose 291 (H) 70 - 99 mg/dL    GFR Estimate >90 >60 mL/min/1.73m2   WBC and Differential    Collection Time: 07/21/23  8:02 AM   Result Value Ref Range    WBC Count 1.9 (L) 4.0 - 11.0 10e3/uL    % Neutrophils 40 %    % Lymphocytes 40 %    % Monocytes 14 %    % Eosinophils 3 %    % Basophils 1 %    % Immature Granulocytes 2 %    NRBCs per 100 WBC 0 <1 /100    Absolute Neutrophils 0.8 (L) 1.6 - 8.3 10e3/uL    Absolute Lymphocytes 0.7 (L) 0.8 - 5.3 10e3/uL    Absolute Monocytes 0.3 0.0 - 1.3 10e3/uL    Absolute Eosinophils 0.1 0.0 - 0.7 10e3/uL    Absolute Basophils 0.0 0.0 - 0.2 10e3/uL    Absolute Immature Granulocytes 0.0 <=0.4 10e3/uL    Absolute NRBCs 0.0 10e3/uL      IMPRESSION:  Mr. Salter is 6 months status post liver transplantation and really doing quite well.  We will wean him off CellCept over the next 6 weeks.  We then will go ahead and wean him off prednisone so we will be able to maintain his tacrolimus level at a higher dose given how good his kidney function is at this point in time.    I did tell him to start watching his weight gain, as certainly this is a time when people tend to gain a fair amount of weight.  Otherwise, all other complications are well addressed, and I will see him back in the clinic in 3 months.    I did spend a total of 40 minutes (on the date of the encounter), including 30 minutes of face-to-face clinic time including counseling. The rest of the time was spent in documentation and review of records.     Thank you very much for allowing me to participate in the care of this patient.  If you have any questions regarding recommendations, please do not hesitate to contact me.         Sergio Wood MD      Professor of Medicine  [employer]       Executive Medical Director, Solid Organ Transplant Program  [facility]

## 2023-07-21 NOTE — TELEPHONE ENCOUNTER
ISSUE:   Tacrolimus IR level 10.7 on 7/21, goal 8-10, dose 3 mg AM, 2 mg PM. This level was a 17 hour trough.    PLAN:   Please call patient and confirm this was an accurate 12-hour trough, was 17 hours since last dose. Verify Tacrolimus IR dose 3 mg AM, 2 mg PM. Confirm no new medications or illness. Confirm no missed doses. If accurate trough and accurate dose, decrease Tacrolimus IR dose to 2 mg BID and repeat labs in 1 weeks    OUTCOME:   Spoke with patient, they confirm accurate trough level and current dose 3 mg AM, 2 mg PM. Patient confirmed dose change to 2 mg BID and to repeat labs in 1 weeks. Orders sent to preferred pharmacy for dose change and lab for repeat labs. Patient voiced understanding of plan.

## 2023-07-24 ENCOUNTER — APPOINTMENT (OUTPATIENT)
Dept: GENERAL RADIOLOGY | Facility: CLINIC | Age: 28
End: 2023-07-24
Attending: INTERNAL MEDICINE
Payer: COMMERCIAL

## 2023-07-24 ENCOUNTER — ANESTHESIA (OUTPATIENT)
Dept: SURGERY | Facility: CLINIC | Age: 28
End: 2023-07-24
Payer: COMMERCIAL

## 2023-07-24 ENCOUNTER — HOSPITAL ENCOUNTER (OUTPATIENT)
Facility: CLINIC | Age: 28
Discharge: HOME | End: 2023-07-24
Attending: INTERNAL MEDICINE | Admitting: INTERNAL MEDICINE
Payer: COMMERCIAL

## 2023-07-24 ENCOUNTER — ANESTHESIA EVENT (OUTPATIENT)
Dept: SURGERY | Facility: CLINIC | Age: 28
End: 2023-07-24
Payer: COMMERCIAL

## 2023-07-24 VITALS
HEART RATE: 70 BPM | HEIGHT: 65 IN | RESPIRATION RATE: 16 BRPM | WEIGHT: 154.1 LBS | BODY MASS INDEX: 25.67 KG/M2 | TEMPERATURE: 97.7 F | DIASTOLIC BLOOD PRESSURE: 98 MMHG | SYSTOLIC BLOOD PRESSURE: 146 MMHG | OXYGEN SATURATION: 98 %

## 2023-07-24 LAB
ALBUMIN SERPL BCG-MCNC: 4.1 G/DL (ref 3.5–5.2)
ALP SERPL-CCNC: 180 U/L (ref 40–129)
ALT SERPL W P-5'-P-CCNC: 20 U/L (ref 0–70)
AMYLASE SERPL-CCNC: 18 U/L (ref 28–100)
ANION GAP SERPL CALCULATED.3IONS-SCNC: 9 MMOL/L (ref 7–15)
AST SERPL W P-5'-P-CCNC: 33 U/L (ref 0–45)
BILIRUB SERPL-MCNC: 0.7 MG/DL
BUN SERPL-MCNC: 22 MG/DL (ref 6–20)
CALCIUM SERPL-MCNC: 10 MG/DL (ref 8.6–10)
CHLORIDE SERPL-SCNC: 96 MMOL/L (ref 98–107)
CREAT SERPL-MCNC: 0.99 MG/DL (ref 0.67–1.17)
DEPRECATED HCO3 PLAS-SCNC: 30 MMOL/L (ref 22–29)
ERCP: NORMAL
ERYTHROCYTE [DISTWIDTH] IN BLOOD BY AUTOMATED COUNT: 13.1 % (ref 10–15)
GFR SERPL CREATININE-BSD FRML MDRD: >90 ML/MIN/1.73M2
GLUCOSE BLDC GLUCOMTR-MCNC: 108 MG/DL (ref 70–99)
GLUCOSE BLDC GLUCOMTR-MCNC: 98 MG/DL (ref 70–99)
GLUCOSE SERPL-MCNC: 102 MG/DL (ref 70–99)
HCT VFR BLD AUTO: 34.6 % (ref 40–53)
HGB BLD-MCNC: 11.1 G/DL (ref 13.3–17.7)
INR PPP: 0.98 (ref 0.85–1.15)
LIPASE SERPL-CCNC: 7 U/L (ref 13–60)
MCH RBC QN AUTO: 29.1 PG (ref 26.5–33)
MCHC RBC AUTO-ENTMCNC: 32.1 G/DL (ref 31.5–36.5)
MCV RBC AUTO: 91 FL (ref 78–100)
PLATELET # BLD AUTO: 140 10E3/UL (ref 150–450)
POTASSIUM SERPL-SCNC: 5 MMOL/L (ref 3.4–5.3)
PROT SERPL-MCNC: 7.1 G/DL (ref 6.4–8.3)
RBC # BLD AUTO: 3.81 10E6/UL (ref 4.4–5.9)
SODIUM SERPL-SCNC: 135 MMOL/L (ref 136–145)
WBC # BLD AUTO: 1.8 10E3/UL (ref 4–11)

## 2023-07-24 PROCEDURE — 710N000010 HC RECOVERY PHASE 1, LEVEL 2, PER MIN: Performed by: INTERNAL MEDICINE

## 2023-07-24 PROCEDURE — 272N000001 HC OR GENERAL SUPPLY STERILE: Performed by: INTERNAL MEDICINE

## 2023-07-24 PROCEDURE — 250N000009 HC RX 250: Performed by: INTERNAL MEDICINE

## 2023-07-24 PROCEDURE — C2617 STENT, NON-COR, TEM W/O DEL: HCPCS | Performed by: INTERNAL MEDICINE

## 2023-07-24 PROCEDURE — 255N000002 HC RX 255 OP 636: Mod: JZ | Performed by: INTERNAL MEDICINE

## 2023-07-24 PROCEDURE — 250N000009 HC RX 250

## 2023-07-24 PROCEDURE — 250N000025 HC SEVOFLURANE, PER MIN: Performed by: INTERNAL MEDICINE

## 2023-07-24 PROCEDURE — 85610 PROTHROMBIN TIME: CPT | Performed by: INTERNAL MEDICINE

## 2023-07-24 PROCEDURE — 85027 COMPLETE CBC AUTOMATED: CPT | Performed by: INTERNAL MEDICINE

## 2023-07-24 PROCEDURE — 710N000012 HC RECOVERY PHASE 2, PER MINUTE: Performed by: INTERNAL MEDICINE

## 2023-07-24 PROCEDURE — 82150 ASSAY OF AMYLASE: CPT | Performed by: INTERNAL MEDICINE

## 2023-07-24 PROCEDURE — C1769 GUIDE WIRE: HCPCS | Performed by: INTERNAL MEDICINE

## 2023-07-24 PROCEDURE — 82962 GLUCOSE BLOOD TEST: CPT

## 2023-07-24 PROCEDURE — C1726 CATH, BAL DIL, NON-VASCULAR: HCPCS | Performed by: INTERNAL MEDICINE

## 2023-07-24 PROCEDURE — 999N000181 XR SURGERY CARM FLUORO GREATER THAN 5 MIN W STILLS: Mod: TC

## 2023-07-24 PROCEDURE — 80053 COMPREHEN METABOLIC PANEL: CPT | Performed by: INTERNAL MEDICINE

## 2023-07-24 PROCEDURE — 370N000017 HC ANESTHESIA TECHNICAL FEE, PER MIN: Performed by: INTERNAL MEDICINE

## 2023-07-24 PROCEDURE — 250N000011 HC RX IP 250 OP 636: Mod: JZ | Performed by: STUDENT IN AN ORGANIZED HEALTH CARE EDUCATION/TRAINING PROGRAM

## 2023-07-24 PROCEDURE — 250N000011 HC RX IP 250 OP 636: Mod: JZ | Performed by: ANESTHESIOLOGY

## 2023-07-24 PROCEDURE — 999N000141 HC STATISTIC PRE-PROCEDURE NURSING ASSESSMENT: Performed by: INTERNAL MEDICINE

## 2023-07-24 PROCEDURE — 250N000009 HC RX 250: Performed by: STUDENT IN AN ORGANIZED HEALTH CARE EDUCATION/TRAINING PROGRAM

## 2023-07-24 PROCEDURE — 258N000003 HC RX IP 258 OP 636

## 2023-07-24 PROCEDURE — 250N000011 HC RX IP 250 OP 636

## 2023-07-24 PROCEDURE — 360N000082 HC SURGERY LEVEL 2 W/ FLUORO, PER MIN: Performed by: INTERNAL MEDICINE

## 2023-07-24 PROCEDURE — 83690 ASSAY OF LIPASE: CPT | Performed by: INTERNAL MEDICINE

## 2023-07-24 DEVICE — IMPLANTABLE DEVICE
Type: IMPLANTABLE DEVICE | Site: BILE DUCT | Status: NON-FUNCTIONAL
Removed: 2023-11-08

## 2023-07-24 RX ORDER — LABETALOL HYDROCHLORIDE 5 MG/ML
10 INJECTION, SOLUTION INTRAVENOUS
Status: CANCELLED | OUTPATIENT
Start: 2023-07-24

## 2023-07-24 RX ORDER — HYDROMORPHONE HCL IN WATER/PF 6 MG/30 ML
0.2 PATIENT CONTROLLED ANALGESIA SYRINGE INTRAVENOUS EVERY 5 MIN PRN
Status: DISCONTINUED | OUTPATIENT
Start: 2023-07-24 | End: 2023-07-24 | Stop reason: HOSPADM

## 2023-07-24 RX ORDER — FENTANYL CITRATE 50 UG/ML
25 INJECTION, SOLUTION INTRAMUSCULAR; INTRAVENOUS EVERY 5 MIN PRN
Status: DISCONTINUED | OUTPATIENT
Start: 2023-07-24 | End: 2023-07-24 | Stop reason: HOSPADM

## 2023-07-24 RX ORDER — HYDROMORPHONE HCL IN WATER/PF 6 MG/30 ML
0.2 PATIENT CONTROLLED ANALGESIA SYRINGE INTRAVENOUS EVERY 5 MIN PRN
Status: CANCELLED | OUTPATIENT
Start: 2023-07-24

## 2023-07-24 RX ORDER — ONDANSETRON 2 MG/ML
4 INJECTION INTRAMUSCULAR; INTRAVENOUS EVERY 30 MIN PRN
Status: CANCELLED | OUTPATIENT
Start: 2023-07-24

## 2023-07-24 RX ORDER — INDOMETHACIN 50 MG/1
SUPPOSITORY RECTAL PRN
Status: DISCONTINUED | OUTPATIENT
Start: 2023-07-24 | End: 2023-07-24 | Stop reason: HOSPADM

## 2023-07-24 RX ORDER — ONDANSETRON 4 MG/1
4 TABLET, ORALLY DISINTEGRATING ORAL EVERY 6 HOURS PRN
Status: DISCONTINUED | OUTPATIENT
Start: 2023-07-24 | End: 2023-07-24 | Stop reason: HOSPADM

## 2023-07-24 RX ORDER — NALOXONE HYDROCHLORIDE 0.4 MG/ML
0.2 INJECTION, SOLUTION INTRAMUSCULAR; INTRAVENOUS; SUBCUTANEOUS
Status: DISCONTINUED | OUTPATIENT
Start: 2023-07-24 | End: 2023-07-24 | Stop reason: HOSPADM

## 2023-07-24 RX ORDER — KETAMINE HYDROCHLORIDE 10 MG/ML
INJECTION INTRAMUSCULAR; INTRAVENOUS PRN
Status: DISCONTINUED | OUTPATIENT
Start: 2023-07-24 | End: 2023-07-24

## 2023-07-24 RX ORDER — INDOMETHACIN 50 MG/1
100 SUPPOSITORY RECTAL
Status: DISCONTINUED | OUTPATIENT
Start: 2023-07-24 | End: 2023-07-24 | Stop reason: HOSPADM

## 2023-07-24 RX ORDER — ONDANSETRON 2 MG/ML
INJECTION INTRAMUSCULAR; INTRAVENOUS PRN
Status: DISCONTINUED | OUTPATIENT
Start: 2023-07-24 | End: 2023-07-24

## 2023-07-24 RX ORDER — ONDANSETRON 4 MG/1
4 TABLET, ORALLY DISINTEGRATING ORAL EVERY 30 MIN PRN
Status: CANCELLED | OUTPATIENT
Start: 2023-07-24

## 2023-07-24 RX ORDER — SODIUM CHLORIDE, SODIUM LACTATE, POTASSIUM CHLORIDE, CALCIUM CHLORIDE 600; 310; 30; 20 MG/100ML; MG/100ML; MG/100ML; MG/100ML
INJECTION, SOLUTION INTRAVENOUS CONTINUOUS
Status: DISCONTINUED | OUTPATIENT
Start: 2023-07-24 | End: 2023-07-24 | Stop reason: HOSPADM

## 2023-07-24 RX ORDER — ONDANSETRON 2 MG/ML
4 INJECTION INTRAMUSCULAR; INTRAVENOUS EVERY 6 HOURS PRN
Status: DISCONTINUED | OUTPATIENT
Start: 2023-07-24 | End: 2023-07-24 | Stop reason: HOSPADM

## 2023-07-24 RX ORDER — ONDANSETRON 4 MG/1
4 TABLET, ORALLY DISINTEGRATING ORAL EVERY 30 MIN PRN
Status: DISCONTINUED | OUTPATIENT
Start: 2023-07-24 | End: 2023-07-24 | Stop reason: HOSPADM

## 2023-07-24 RX ORDER — PROPOFOL 10 MG/ML
INJECTION, EMULSION INTRAVENOUS PRN
Status: DISCONTINUED | OUTPATIENT
Start: 2023-07-24 | End: 2023-07-24

## 2023-07-24 RX ORDER — ONDANSETRON 2 MG/ML
4 INJECTION INTRAMUSCULAR; INTRAVENOUS EVERY 30 MIN PRN
Status: DISCONTINUED | OUTPATIENT
Start: 2023-07-24 | End: 2023-07-24 | Stop reason: HOSPADM

## 2023-07-24 RX ORDER — LIDOCAINE 40 MG/G
CREAM TOPICAL
Status: DISCONTINUED | OUTPATIENT
Start: 2023-07-24 | End: 2023-07-24 | Stop reason: HOSPADM

## 2023-07-24 RX ORDER — HYDRALAZINE HYDROCHLORIDE 20 MG/ML
2.5-5 INJECTION INTRAMUSCULAR; INTRAVENOUS EVERY 10 MIN PRN
Status: CANCELLED | OUTPATIENT
Start: 2023-07-24

## 2023-07-24 RX ORDER — SODIUM CHLORIDE, SODIUM LACTATE, POTASSIUM CHLORIDE, CALCIUM CHLORIDE 600; 310; 30; 20 MG/100ML; MG/100ML; MG/100ML; MG/100ML
INJECTION, SOLUTION INTRAVENOUS CONTINUOUS PRN
Status: DISCONTINUED | OUTPATIENT
Start: 2023-07-24 | End: 2023-07-24

## 2023-07-24 RX ORDER — SODIUM CHLORIDE, SODIUM LACTATE, POTASSIUM CHLORIDE, CALCIUM CHLORIDE 600; 310; 30; 20 MG/100ML; MG/100ML; MG/100ML; MG/100ML
INJECTION, SOLUTION INTRAVENOUS CONTINUOUS
Status: CANCELLED | OUTPATIENT
Start: 2023-07-24

## 2023-07-24 RX ORDER — LEVOFLOXACIN 5 MG/ML
INJECTION, SOLUTION INTRAVENOUS PRN
Status: DISCONTINUED | OUTPATIENT
Start: 2023-07-24 | End: 2023-07-24

## 2023-07-24 RX ORDER — LIDOCAINE HYDROCHLORIDE 20 MG/ML
INJECTION, SOLUTION INFILTRATION; PERINEURAL PRN
Status: DISCONTINUED | OUTPATIENT
Start: 2023-07-24 | End: 2023-07-24

## 2023-07-24 RX ORDER — NALOXONE HYDROCHLORIDE 0.4 MG/ML
0.4 INJECTION, SOLUTION INTRAMUSCULAR; INTRAVENOUS; SUBCUTANEOUS
Status: DISCONTINUED | OUTPATIENT
Start: 2023-07-24 | End: 2023-07-24 | Stop reason: HOSPADM

## 2023-07-24 RX ORDER — FENTANYL CITRATE 50 UG/ML
25 INJECTION, SOLUTION INTRAMUSCULAR; INTRAVENOUS EVERY 5 MIN PRN
Status: CANCELLED | OUTPATIENT
Start: 2023-07-24

## 2023-07-24 RX ORDER — FENTANYL CITRATE 50 UG/ML
INJECTION, SOLUTION INTRAMUSCULAR; INTRAVENOUS PRN
Status: DISCONTINUED | OUTPATIENT
Start: 2023-07-24 | End: 2023-07-24

## 2023-07-24 RX ORDER — FLUMAZENIL 0.1 MG/ML
0.2 INJECTION, SOLUTION INTRAVENOUS
Status: DISCONTINUED | OUTPATIENT
Start: 2023-07-24 | End: 2023-07-24 | Stop reason: HOSPADM

## 2023-07-24 RX ORDER — OXYCODONE HYDROCHLORIDE 5 MG/1
5 TABLET ORAL
Status: CANCELLED | OUTPATIENT
Start: 2023-07-24

## 2023-07-24 RX ORDER — IOPAMIDOL 510 MG/ML
INJECTION, SOLUTION INTRAVASCULAR PRN
Status: DISCONTINUED | OUTPATIENT
Start: 2023-07-24 | End: 2023-07-24 | Stop reason: HOSPADM

## 2023-07-24 RX ADMIN — PROPOFOL 100 MG: 10 INJECTION, EMULSION INTRAVENOUS at 12:57

## 2023-07-24 RX ADMIN — ONDANSETRON 4 MG: 2 INJECTION INTRAMUSCULAR; INTRAVENOUS at 14:23

## 2023-07-24 RX ADMIN — SUGAMMADEX 200 MG: 100 INJECTION, SOLUTION INTRAVENOUS at 14:01

## 2023-07-24 RX ADMIN — SODIUM CHLORIDE, POTASSIUM CHLORIDE, SODIUM LACTATE AND CALCIUM CHLORIDE: 600; 310; 30; 20 INJECTION, SOLUTION INTRAVENOUS at 12:44

## 2023-07-24 RX ADMIN — Medication 30 MG: at 13:01

## 2023-07-24 RX ADMIN — ONDANSETRON 4 MG: 2 INJECTION INTRAMUSCULAR; INTRAVENOUS at 14:00

## 2023-07-24 RX ADMIN — Medication 50 MG: at 12:57

## 2023-07-24 RX ADMIN — FENTANYL CITRATE 50 MCG: 50 INJECTION, SOLUTION INTRAMUSCULAR; INTRAVENOUS at 12:57

## 2023-07-24 RX ADMIN — MIDAZOLAM 2 MG: 1 INJECTION INTRAMUSCULAR; INTRAVENOUS at 12:44

## 2023-07-24 RX ADMIN — LEVOFLOXACIN 500 MG: 5 INJECTION, SOLUTION INTRAVENOUS at 12:44

## 2023-07-24 RX ADMIN — LIDOCAINE HYDROCHLORIDE 80 MG: 20 INJECTION, SOLUTION INFILTRATION; PERINEURAL at 12:57

## 2023-07-24 ASSESSMENT — ACTIVITIES OF DAILY LIVING (ADL)
ADLS_ACUITY_SCORE: 35
ADLS_ACUITY_SCORE: 35

## 2023-07-24 NOTE — ANESTHESIA POSTPROCEDURE EVALUATION
Patient: Dillon Salter    Procedure: Procedure(s):  ENDOSCOPIC RETROGRADE CHOLANGIOPANCREATOGRAPHY, WITH REPLACEMENT OF STENT, stone and sludge removal       Anesthesia Type:  General    Note:  Disposition: Outpatient   Postop Pain Control: Uneventful            Sign Out: Well controlled pain   PONV: No   Neuro/Psych: Uneventful            Sign Out: Acceptable/Baseline neuro status   Airway/Respiratory: Uneventful            Sign Out: Acceptable/Baseline resp. status   CV/Hemodynamics: Uneventful            Sign Out: Acceptable CV status; No obvious hypovolemia; No obvious fluid overload   Other NRE: NONE   DID A NON-ROUTINE EVENT OCCUR? No           Last vitals:  Vitals Value Taken Time   /109 07/24/23 1417   Temp 36.4  C (97.6  F) 07/24/23 1412   Pulse 64 07/24/23 1426   Resp 18 07/24/23 1415   SpO2 100 % 07/24/23 1426   Vitals shown include unvalidated device data.    Electronically Signed By: Tl Harvey MD  July 24, 2023  2:27 PM

## 2023-07-24 NOTE — BRIEF OP NOTE
[facility]    Brief Operative Note    Pre-operative diagnosis: Biliary stricture [K83.1]  Post-operative diagnosis Same as pre-operative diagnosis    Procedure: Procedure(s):  ENDOSCOPIC RETROGRADE CHOLANGIOPANCREATOGRAPHY, WITH REPLACEMENT OF STENT, stone and sludge removal  Surgeon: Surgeon(s) and Role:     * Guru Adeline Wright MD - Primary     * Suki Lovett MD  Anesthesia: General   Estimated Blood Loss: None    Drains: None  Specimens: * No specimens in log *  Findings:   None.  Complications: None.  Implants:   Implant Name Type Inv. Item Serial No.  Lot No. LRB No. Used Action   STENT COTTON-FREED ([place]) DAVIDA SOF-FLEX [id number] - SN/[id number] Stent STENT COTTON-FREED ([place]) DAVIDA SOF-FLEX [id number] N/A [employer] [id number] N/A 1 Explanted   STENT COTTON-FREED ([place]) DAVIDA SOF-FLEX [id number] - [id number] Stent STENT COTTON-FREED ([place]) DAVIDA SOF-FLEX [id number]  [employer] [id number] N/A 1 Implanted   STENT COTTON-FREED ([place]) DAVIDA SOF-FLEX [id number] - [id number] Stent STENT COTTON-FREED ([place]) DAVIDA SOF-FLEX [id number]  [employer] [id number] N/A 1 Implanted       Findings:  film demonstrated previous biliary stent in place.  The ampulla demonstrated previous 5mm biliary sphincterotomy.The previous 10Fr x 9cm SF stent was removed with Raptor forceps.  Cholangiogram demonstrated persistent tight anastomotic stricture, with mild diffuse donor intra- and extrahepatics.  The recipient common was unremarkable.  Two 10Fr x 9cm SF stents with a single internal flap and a single external flap were placed were placed into the common duct across the stenosis.      Recommendations:  - follow up in 8-10 weeks for ERCP

## 2023-07-24 NOTE — ANESTHESIA PREPROCEDURE EVALUATION
Anesthesia Pre-Procedure Evaluation    Patient: Dillon Salter   MRN: [id number] : 1995        Procedure : Procedure(s):  ENDOSCOPIC RETROGRADE CHOLANGIOPANCREATOGRAPHY, WITH REPLACEMENT OF TUBE OR STENT          Past Medical History:   Diagnosis Date     Acute on chronic anemia 2022     Alcohol use disorder      Alcohol use disorder, severe, dependence (H) 2022     Alcoholic cirrhosis of liver with ascites (H)      Alcoholic hepatitis      Autism spectrum disorder 2022    High functioning autistic.  26-year-old history of autism/Asperger's on the spectrum graduated high school at [employer] worked at [employer] and then another  place until the Covid epidemic in  lives with parents (Leticia and Wes) socially isolated drinks alcohol beer daily      Benign essential hypertension      Bleeding esophageal varices (H) 2022     Hepatic encephalopathy (H)      Hyponatremia 2022     Major depressive disorder      Neuropathic pain      Status post liver transplantation (H) 2023     Type II Diabetes       Past Surgical History:   Procedure Laterality Date     BENCH LIVER  2023    Procedure: Bench liver;  Surgeon: Thelma Sterling MD;  [facility]     COLONOSCOPY N/A 2023    Procedure: Colonoscopy;  Surgeon: Osman Montoya MD;  [facility]     ENDOSCOPIC RETROGRADE CHOLANGIOPANCREATOGRAM N/A 2023    Procedure: Endoscopic retrograde cholangiopancreatogram. Biliary sphincterotomy, dilation, abnd stent placement;  Surgeon: Jarrell Mullins MD;  [facility]     ENDOSCOPIC ULTRASOUND LOWER GASTROINTESTIONAL TRACT (GI) N/A 2023    Procedure: ULTRASOUND, LOWER GI TRACT, ENDOSCOPIC with glueing;  Surgeon: Osman Montoya MD;  [facility]     ESOPHAGOSCOPY, GASTROSCOPY, DUODENOSCOPY (EGD), COMBINED N/A 2022    Procedure: ESOPHAGOGASTRODUODENOSCOPY (EGD) with esophageal banding;  Surgeon: Gary Hurst MD;  Location: [facility]  OR     ESOPHAGOSCOPY, GASTROSCOPY, DUODENOSCOPY (EGD), COMBINED N/A 2022    Procedure: ESOPHAGOGASTRODUODENOSCOPY;  Surgeon: Gavino Reza MD;  Location: [facility]     ESOPHAGOSCOPY, GASTROSCOPY, DUODENOSCOPY (EGD), COMBINED N/A 2023    Procedure: ESOPHAGOGASTRODUODENOSCOPY (EGD) with esophageal variceal banding;  Surgeon: Juan Boo MD;  Location: [facility]     ESOPHAGOSCOPY, GASTROSCOPY, DUODENOSCOPY (EGD), COMBINED N/A 2023    Procedure: ESOPHAGOGASTRODUODENOSCOPY (EGD);  Surgeon: Juan Boo MD;  Location: [facility]     ESOPHAGOSCOPY, GASTROSCOPY, DUODENOSCOPY (EGD), COMBINED N/A 2023    Procedure: Esophagoscopy, gastroscopy, duodenoscopy (EGD), combined;  Surgeon: Osman Montoya MD;  [facility]     ESOPHAGOSCOPY, GASTROSCOPY, DUODENOSCOPY (EGD), COMBINED N/A 2023    Procedure: ESOPHAGOGASTRODUODENOSCOPY (EGD);  Surgeon: Leventhal, Thomas Michael, MD;  Location: [facility]     IR CVC NON TUNNEL LINE REMOVAL  3/10/2023     IR CVC TUNNEL PLACEMENT > 5 YRS OF AGE  3/10/2023     IR CVC TUNNEL REMOVAL RIGHT  2023     IR LIVER BIOPSY PERCUTANEOUS  2023     MIDLINE DOUBLE LUMEN PLACEMENT  2022          PICC TRIPLE LUMEN PLACEMENT  2022          RETURN LIVER TRANSPLANT N/A 3/1/2023    Procedure: RETURN TO OPERATING ROOM, AFTER LIVER TRANSPLANT;  Surgeon: Thelma Sterling MD;  Location: [facility]     TRANSPLANT LIVER RECIPIENT  DONOR N/A 2023    Procedure: Transplant liver recipient  donor;  Surgeon: Thelma Sterling MD;  Location: [facility]      No Known Allergies   Social History     Tobacco Use     Smoking status: Former     Smokeless tobacco: Never     Tobacco comments:     A pack lasted a year, hardly smoked   Substance Use Topics     Alcohol use: Not Currently     Comment: No ETOH since 22      Wt Readings from Last 1 Encounters:   23 68.9 kg (152 lb)        Anesthesia Evaluation   Pt has had  prior anesthetic. Type: General.    History of anesthetic complications       ROS/MED HX  ENT/Pulmonary: Comment: Intubated, minimal ventilatory support. - neg pulmonary ROS     Neurologic: Comment: Sedated, opens his eyes to painful stimulation. Not following commands. History of hepatic encephalopathy.      Cardiovascular: Comment: Off vasoactive gtt at the time of this evaluation.    (+) hypertension-----Previous cardiac testing   Echo: Date: 2/20/23 Results:  Interpretation Summary  Normal dobutamine stress echocardiogram without evidence of inducible  ischemia. Target heart rate was achieved. Heart rate and blood pressure  response to dobutamine were normal. Normal LV function and wall motion at  rest. With stress, the left ventricular ejection fraction increased from 55-  60% to greater than 65% and the left ventricular size decreased appropriately.  No regional wall motion abnormality with stress.  No subjective symptoms to suggest ischemia.  There was no ECG evidence of ischemia.  No significant valve disease on screening doppler evaluation. The aortic root  and visualized ascending aorta are normal.  Stress Test: Date: Results:    ECG Reviewed: Date: Results:    Cath: Date: Results:      METS/Exercise Tolerance:     Hematologic: Comments: Significant blood products requirement overnight.  2U PRBC, 2 U FFP, 2U Cryo, 2 Liters LR, 500 ml Albumin. This morning received 3U PRBC, 1U FFP.    (+) anemia,     Musculoskeletal:       GI/Hepatic: Comment: Alcohol related cirrhosis c/b hepatic encephalopathy, history of esophageal varices bleeding (5/2022, 1/2023) and rectal varices s/p sclerotherapy 1/2023, ascites presenting with hematemesis.  MELD-Na 27.  Underwent EGD 2/11 showed oozing portal hypertensive gastropathy.  Malnutrition.    (+) hepatitis type Alcoholic, liver disease,     Renal/Genitourinary: Comment: Acute Kidney Injury, anuric since last night.  Will start CRRT after surgery today. Dialysis catheter  in place Right groin. Electrolytes results pending.     (+) renal disease, type: ARF,     Endo: Comment: Insulin gtt     (+) type I DM, type II DM,     Psychiatric/Substance Use:  - neg psychiatric ROS   (+) psychiatric history other (comment) (Asperger's syndrome) alcohol abuse     Infectious Disease:  - neg infectious disease ROS     Malignancy:  - neg malignancy ROS     Other:      (+) , H/O Chronic Pain,        Physical Exam    Airway  airway exam normal      Mallampati: II   TM distance: > 3 FB   Neck ROM: full   Mouth opening: > 3 cm    Respiratory Devices and Support         Dental    unable to assess    (+) Minor Abnormalities - some fillings, tiny chips      Cardiovascular   cardiovascular exam normal       Rhythm and rate: regular and normal     Pulmonary           (+) decreased breath sounds           OUTSIDE LABS:  CBC:   Lab Results   Component Value Date    WBC 1.9 (L) 07/21/2023    WBC 1.9 (L) 07/21/2023    HGB 10.7 (L) 07/21/2023    HGB 11.2 (L) 07/13/2023    HCT 33.0 (L) 07/21/2023    HCT 34.9 (L) 07/13/2023     (L) 07/21/2023     07/13/2023     BMP:   Lab Results   Component Value Date     07/21/2023     07/13/2023    POTASSIUM 4.4 07/21/2023    POTASSIUM 4.8 07/13/2023    CHLORIDE 101 07/21/2023    CHLORIDE 98 07/13/2023    CO2 29 07/21/2023    CO2 31 07/13/2023    BUN 26.1 (H) 07/21/2023    BUN 23 (H) 07/13/2023    CR 1.09 07/21/2023    CR 1.16 07/13/2023     (H) 07/21/2023     (H) 07/13/2023     COAGS:   Lab Results   Component Value Date    PTT 30 03/07/2023    INR 0.98 05/18/2023    FIBR 375 03/07/2023     POC: No results found for: BGM, HCG, HCGS  HEPATIC:   Lab Results   Component Value Date    ALBUMIN 4.0 07/21/2023    PROTTOTAL 6.8 07/21/2023    ALT 19 07/21/2023    AST 24 07/21/2023    ALKPHOS 187 (H) 07/21/2023    BILITOTAL 0.6 07/21/2023    DESI 11 (L) 03/05/2023     OTHER:   Lab Results   Component Value Date    PH 7.32 (L) 03/04/2023    LACT  0.7 03/06/2023    A1C 5.2 03/12/2023    SARTHAK 10.0 07/21/2023    PHOS 3.4 07/21/2023    MAG 1.8 07/21/2023    LIPASE 82 (H) 05/24/2023    AMYLASE 87 05/24/2023    TSH 1.09 07/30/2022       Anesthesia Plan    ASA Status:  4, emergent    NPO Status:  NPO Appropriate    Anesthesia Type: General.     - Airway: ETT   Induction: Intravenous, Propofol.   Maintenance: Balanced.   Techniques and Equipment:     - Lines/Monitors: 2nd IV     - Blood: Blood in Room     Consents    Anesthesia Plan(s) and associated risks, benefits, and realistic alternatives discussed. Questions answered and patient/representative(s) expressed understanding.     - Discussed: Risks, Benefits and Alternatives for the PROCEDURE were discussed     - Discussed with:  Patient      - Extended Intubation/Ventilatory Support Discussed: Yes.      - Patient is DNR/DNI Status: No    Use of blood products discussed: Yes.     - Consented: consented to blood products            Reason for refusal: other.     Postoperative Care    Pain management: Multi-modal analgesia.   PONV prophylaxis: Ondansetron (or other 5HT-3)     Comments:                Tl Harvey MD

## 2023-07-24 NOTE — H&P
Gastroenterology Pre-op History and Physical    Dillon Salter MRN# [id number]   Age: 27 year old YOB: 1995      Date of Surgery: 23  Location [facility]      Date of Exam 2023 Facility Same Day       Primary care provider: Gary Rodrigues         Chief Complaint and/or Reason for Procedure:   26yo gentleman with a history of  alcoholic cirrohosis status post  donor duct   to duct liver transplant in 2023 with an anastomic biliary stenosis noted s/p ERCP 23 with a 10Fr x 9cm placed into the common duct across the stenosis.  The patient has no complaints today - denies abdominal pain or jaundice.           Past Medical and Surgical History:     Past Medical History:   Diagnosis Date    Acute on chronic anemia 2022    Alcohol use disorder     Alcohol use disorder, severe, dependence (H) 2022    Alcoholic cirrhosis of liver with ascites (H)     Alcoholic hepatitis     Autism spectrum disorder 2022    High functioning autistic.  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  lives with parents (Leticia and Wes) socially isolated drinks alcohol beer daily     Benign essential hypertension     Bleeding esophageal varices (H) 2022    Hepatic encephalopathy (H)     Hyponatremia 2022    Major depressive disorder     Neuropathic pain     Status post liver transplantation (H) 2023    Type II Diabetes      Past Surgical History:   Procedure Laterality Date    BENCH LIVER  2023    Procedure: Bench liver;  Surgeon: Thelma Sterling MD;  [facility]    COLONOSCOPY N/A 2023    Procedure: Colonoscopy;  Surgeon: Osman Montoya MD;  [facility]    ENDOSCOPIC RETROGRADE CHOLANGIOPANCREATOGRAM N/A 2023    Procedure: Endoscopic retrograde cholangiopancreatogram. Biliary  sphincterotomy, dilation, abnd stent placement;  Surgeon: Jarrell Mullins MD;  [facility]    ENDOSCOPIC ULTRASOUND LOWER GASTROINTESTIONAL TRACT (GI) N/A 2023    Procedure: ULTRASOUND, LOWER GI TRACT, ENDOSCOPIC with glueing;  Surgeon: Osman Montoya MD;  [facility]    ESOPHAGOSCOPY, GASTROSCOPY, DUODENOSCOPY (EGD), COMBINED N/A 2022    Procedure: ESOPHAGOGASTRODUODENOSCOPY (EGD) with esophageal banding;  Surgeon: Gary Hurst MD;  Location: [facility]    ESOPHAGOSCOPY, GASTROSCOPY, DUODENOSCOPY (EGD), COMBINED N/A 2022    Procedure: ESOPHAGOGASTRODUODENOSCOPY;  Surgeon: Gavino Reza MD;  Location: [facility]    ESOPHAGOSCOPY, GASTROSCOPY, DUODENOSCOPY (EGD), COMBINED N/A 2023    Procedure: ESOPHAGOGASTRODUODENOSCOPY (EGD) with esophageal variceal banding;  Surgeon: Juan Boo MD;  [facility]    ESOPHAGOSCOPY, GASTROSCOPY, DUODENOSCOPY (EGD), COMBINED N/A 2023    Procedure: ESOPHAGOGASTRODUODENOSCOPY (EGD);  Surgeon: Juan Boo MD;  Location: [facility]    ESOPHAGOSCOPY, GASTROSCOPY, DUODENOSCOPY (EGD), COMBINED N/A 2023    Procedure: Esophagoscopy, gastroscopy, duodenoscopy (EGD), combined;  Surgeon: Osman Montoya MD;  [facility]    ESOPHAGOSCOPY, GASTROSCOPY, DUODENOSCOPY (EGD), COMBINED N/A 2023    Procedure: ESOPHAGOGASTRODUODENOSCOPY (EGD);  Surgeon: Leventhal, Thomas Michael, MD;  [facility]    IR CVC NON TUNNEL LINE REMOVAL  3/10/2023    IR CVC TUNNEL PLACEMENT > 5 YRS OF AGE  3/10/2023    IR CVC TUNNEL REMOVAL RIGHT  2023    IR LIVER BIOPSY PERCUTANEOUS  2023    MIDLINE DOUBLE LUMEN PLACEMENT  2022         PICC TRIPLE LUMEN PLACEMENT  2022         RETURN LIVER TRANSPLANT N/A 3/1/2023    Procedure: RETURN TO OPERATING ROOM, AFTER LIVER TRANSPLANT;  Surgeon: Thelma Sterling MD;  [facility]    TRANSPLANT LIVER RECIPIENT  DONOR N/A  2023    Procedure: Transplant liver recipient  donor;  Surgeon: Thelma Sterling MD;  [facility]            Medications (include herbals and vitamins):        Medications Prior to Admission   Medication Sig Dispense Refill Last Dose     magnesium glycinate lysinate chelate (DOCTOR'S BEST) 100 MG TABS tablet Take 2 tablets (200 mg) by mouth 2 times daily 360 tablet 3 2023 at 2200    acetaminophen (TYLENOL) 325 MG tablet 2 tablets (650 mg) by Oral or Feeding Tube route every 8 hours as needed for mild pain or fever 100 tablet 0 2023 at 2200    aspirin (ASA) 81 MG EC tablet Take 1 tablet (81 mg) by mouth daily (Patient not taking: Reported on 2023) 30 tablet 0     FLUoxetine (PROZAC) 20 MG capsule Take 3 capsules (60 mg) by mouth At Bedtime 90 capsule 0     gabapentin (NEURONTIN) 100 MG capsule Take 100 mg by mouth (Patient not taking: Reported on 2023)       insulin aspart (NOVOLOG FLEXPEN) 100 UNIT/ML pen 1 unit per 10 grams of carb, plus additional units based on following scale   For Pre-Meal  - 164 give 1 unit. For Pre-Meal  - 189 give 2 units. For Pre-Meal  - 214 give 3 units. For Pre-Meal  - 239 give 4 units. For Pre-Meal  - 264 give 5 units. For Pre-Meal  - 289 give 6 units. For Pre-Meal  - 314 give 7 units. For Pre-Meal  - 339 give 8 units. For Pre-Meal  - 364 give 9 units.  For Pre-Meal BG greater than or equal to 365 give 10 units (Patient not taking: Reported on 2023) 15 mL 3     insulin glargine (LANTUS PEN) 100 UNIT/ML pen Inject 15 Units Subcutaneous every morning AND 10 Units At Bedtime. (Patient taking differently: Inject 1 Units daily) 15 mL 0     metoprolol tartrate (LOPRESSOR) 25 MG tablet Take 1 tablet (25 mg) by mouth 2 times daily 60 tablet 1     multivitamin w/minerals (THERA-VIT-M) tablet Take 1 tablet by mouth daily       mycophenolate (GENERIC EQUIVALENT) 250 MG capsule Take 2 capsules (500 mg)  "by mouth 2 times daily 120 capsule 0     oxyCODONE (ROXICODONE) 5 MG tablet Take 5 mg by mouth every 6 hours as needed for severe pain From PCP       pantoprazole (PROTONIX) 40 MG EC tablet Take 1 tablet (40 mg) by mouth 2 times daily (Patient taking differently: Take 40 mg by mouth daily) 180 tablet 3     predniSONE (DELTASONE) 5 MG tablet Take 1 tablet (5 mg) by mouth daily 90 tablet 1     sulfamethoxazole-trimethoprim (BACTRIM) 400-80 MG tablet Take 1 tablet by mouth three times a week Increase as instructed per transplant team 36 tablet 3     tacrolimus (GENERIC EQUIVALENT) 0.5 MG capsule Take 1 capsule (0.5 mg) by mouth as needed (For dose changes) 180 capsule 1     tacrolimus (GENERIC EQUIVALENT) 1 MG capsule Take 2 capsules (2 mg) by mouth 2 times daily 360 capsule 3     ursodiol (ACTIGALL) 300 MG capsule Take 1 capsule (300 mg) by mouth 2 times daily 180 capsule 3     Vitamin D3 (CHOLECALCIFEROL) 25 mcg (1000 units) tablet 1 tablet (25 mcg) by Oral or Feeding Tube route daily 90 tablet 3              Allergies:    No Known Allergies            Physical Exam:   All vitals have been reviewed  Patient Vitals for the past 8 hrs:   BP Temp Temp src Pulse Resp SpO2 Height Weight   07/24/23 1153 (!) 154/112 97.6  F (36.4  C) Oral 67 18 95 % 1.651 m (5' 5\") 69.9 kg (154 lb 1.6 oz)     No intake/output data recorded.  Airway assessment:   Patient is able to open mouth wide  Patient is able to stick out tongue  Mallampatti classification: Class III (visualization of the soft palate and base of uvula)}      ENT:   Normocephalic, without obvious abnormality, atraumatic, sinuses nontender on palpation, external ears without lesions, oral pharynx with moist mucous membranes, tonsils without erythema or exudates, gums normal and good dentition.     Lungs:   No increased work of breathing, good air exchange, clear to auscultation bilaterally, no crackles or wheezing     Cardiovascular:   regular rate and rhythm     Abdomen: " soft, nontender, nondistended, normoactive bowel sounds            Anesthetic risk and/or ASA classification: 3   26yo gentleman with a history of  alcoholic cirrohosis status post  donor duct   to duct liver transplant in 2023 with an anastomic biliary stenosis noted s/p ERCP 23 with a 10Fr x 9cm placed into the common duct across the stenosis.  Presents today for stricture evaluation and possible stent exchange.    Suki Lovett MD

## 2023-07-24 NOTE — DISCHARGE INSTRUCTIONS
[facility]  Same-Day Surgery   Adult Discharge Orders & Instructions     For 24 hours after surgery    Get plenty of rest.  A responsible adult must stay with you for at least 24 hours after you leave the hospital.   Do not drive or use heavy equipment.  If you have weakness or tingling, don't drive or use heavy equipment until this feeling goes away.  Do not drink alcohol.  Avoid strenuous or risky activities.  Ask for help when climbing stairs.   You may feel lightheaded.  IF so, sit for a few minutes before standing.  Have someone help you get up.   If you have nausea (feel sick to your stomach): Drink only clear liquids such as apple juice, ginger ale, broth or 7-Up.  Rest may also help.  Be sure to drink enough fluids.  Move to a regular diet as you feel able.  You may have a slight fever. Call the doctor if your fever is over 100 F (37.7 C) (taken under the tongue) or lasts longer than 24 hours.  You may have a dry mouth, a sore throat, muscle aches or trouble sleeping.  These should go away after 24 hours.  Do not make important or legal decisions.   Call your doctor for any of the followin.  Signs of infection (fever, growing tenderness at the surgery site, a large amount of drainage or bleeding, severe pain, foul-smelling drainage, redness, swelling).    2. It has been over 8 to 10 hours since surgery and you are still not able to urinate (pass water).    3.  Headache for over 24 hours.    4.  Numbness, tingling or weakness the day after surgery (if you had spinal anesthesia).  To contact a doctor, call Dr. Guru Wright @ [phone] (GI Clinic) or [phone]   or:  '   [phone] and ask for the resident on call for Gastroenterology (answered 24 hours a day)  '   Emergency Department:    [facility]: [phone]       (TTY for hearing impaired: [phone])    [facility]: [phone]       (TTY for hearing impaired: [phone])

## 2023-07-24 NOTE — ANESTHESIA PROCEDURE NOTES
Airway       Patient location during procedure: OR       Procedure Start/Stop Times: 7/24/2023 12:59 PM  Staff -        CRNA: Kirit Beltran APRN CRNA       Performed By: CRNA  Consent for Airway        Urgency: elective  Indications and Patient Condition       Indications for airway management: jalen-procedural       Induction type:intravenous       Mask difficulty assessment: 1 - vent by mask    Final Airway Details       Final airway type: endotracheal airway       Successful airway: ETT - single  Endotracheal Airway Details        ETT size (mm): 7.5       Cuffed: yes       Successful intubation technique: direct laryngoscopy       DL Blade Type: MAC 3       Grade View of Cords: 1       Adjucts: stylet       Position: Right       Measured from: gums/teeth       Secured at (cm): 21       Bite block used: Soft    Post intubation assessment        Placement verified by: capnometry, equal breath sounds and chest rise        Number of attempts at approach: 1       Number of other approaches attempted: 0       Secured with: commercial tube vanegas       Ease of procedure: easy       Dentition: Intact    Medication(s) Administered   Medication Administration Time: 7/24/2023 12:59 PM

## 2023-07-24 NOTE — ANESTHESIA CARE TRANSFER NOTE
Patient: Dillon Salter    Procedure: Procedure(s):  ENDOSCOPIC RETROGRADE CHOLANGIOPANCREATOGRAPHY, WITH REPLACEMENT OF STENT, stone and sludge removal       Diagnosis: Biliary stricture [K83.1]  Diagnosis Additional Information: No value filed.    Anesthesia Type:   General     Note:    Oropharynx: oropharynx clear of all foreign objects and spontaneously breathing  Level of Consciousness: awake  Oxygen Supplementation: nasal cannula  Level of Supplemental Oxygen (L/min / FiO2): 3  Independent Airway: airway patency satisfactory and stable  Dentition: dentition unchanged  Vital Signs Stable: post-procedure vital signs reviewed and stable  Report to RN Given: handoff report given  Patient transferred to: PACU    Handoff Report: Identifed the Patient, Identified the Reponsible Provider, Reviewed the pertinent medical history, Discussed the surgical course, Reviewed Intra-OP anesthesia mangement and issues during anesthesia, Set expectations for post-procedure period and Allowed opportunity for questions and acknowledgement of understanding      Vitals:  Vitals Value Taken Time   /112 07/24/23 1412   Temp 36.2    Pulse 68 07/24/23 1416   Resp 14    SpO2 100 % 07/24/23 1416   Vitals shown include unvalidated device data.    Electronically Signed By: VAMSHI Henderson CRNA  July 24, 2023  2:18 PM

## 2023-07-27 ENCOUNTER — PATIENT OUTREACH (OUTPATIENT)
Dept: GASTROENTEROLOGY | Facility: CLINIC | Age: 28
End: 2023-07-27
Payer: COMMERCIAL

## 2023-07-27 ENCOUNTER — PREP FOR PROCEDURE (OUTPATIENT)
Dept: GASTROENTEROLOGY | Facility: CLINIC | Age: 28
End: 2023-07-27
Payer: COMMERCIAL

## 2023-07-27 DIAGNOSIS — K83.1 BILIARY STRICTURE (H): Primary | ICD-10-CM

## 2023-07-27 NOTE — PROGRESS NOTES
Follow up: Post ERCP on 23 with Dr. Wright.      Post procedure recommendations:   - Observe patient in same day observation unit for ongoing care.   - Repeat ERCP in 8-12 weeks to exchange stent and evaluate anastomotic stricture.   - The findings and recommendations were discussed with the patient and patient's family.   - Follow up with established physicians as scheduled.   - If patient develops fever, chills, jaundice or passes dark urine or has worsening of LFTs before scheduled ERCP, will recommend patient to call us immediately for consideration of an earlier urgent ERCP   - The findings and recommendations were discussed with the patient's family.     Patient states: Unable to reach at this time.     Orders placed:   Please assist in scheduling:     Procedure/Imaging/Clinic: ERCP  Physician: Kyle  Timin-12 weeks  Scope time needed:Provider average  Anesthesia:General  Dx: Biliary stricture  Tier:Tier 3 -   Surgeries/procedures that can be delayed  between 30 to 90 days with no significant  morbidity/mortality to patient or impact on  patient/disease outcome.   Location: [facility]  Header of letter for pt communication: ERCP (Endoscopic Retrograde Cholangiopancreatography) with stent exchange     Comments: Offer 23    Marilu Levine RN Care Coordinator

## 2023-07-27 NOTE — PROGRESS NOTES
Please assist in scheduling:     Procedure/Imaging/Clinic: ERCP  Physician: Kyle  Timin-12 weeks  Scope time needed:Provider average  Anesthesia:General  Dx: Biliary stricture  Tier:Tier 3 -   Surgeries/procedures that can be delayed  between 30 to 90 days with no significant  morbidity/mortality to patient or impact on  patient/disease outcome.   Location: [facility]  Header of letter for pt communication: ERCP (Endoscopic Retrograde Cholangiopancreatography) with stent exchange

## 2023-08-02 ENCOUNTER — LAB (OUTPATIENT)
Dept: LAB | Facility: CLINIC | Age: 28
End: 2023-08-02
Payer: COMMERCIAL

## 2023-08-02 DIAGNOSIS — Z94.4 LIVER REPLACED BY TRANSPLANT (H): ICD-10-CM

## 2023-08-02 LAB
ALBUMIN SERPL-MCNC: 3.4 G/DL (ref 3.5–5)
ALP SERPL-CCNC: 205 U/L (ref 45–120)
ALT SERPL W P-5'-P-CCNC: 20 U/L (ref 0–45)
ANION GAP SERPL CALCULATED.3IONS-SCNC: 7 MMOL/L (ref 5–18)
AST SERPL W P-5'-P-CCNC: 24 U/L (ref 0–40)
BILIRUB DIRECT SERPL-MCNC: 0.3 MG/DL
BILIRUB SERPL-MCNC: 0.7 MG/DL (ref 0–1)
BUN SERPL-MCNC: 17 MG/DL (ref 8–22)
CALCIUM SERPL-MCNC: 9.6 MG/DL (ref 8.5–10.5)
CHLORIDE BLD-SCNC: 96 MMOL/L (ref 98–107)
CO2 SERPL-SCNC: 33 MMOL/L (ref 22–31)
CREAT SERPL-MCNC: 1.06 MG/DL (ref 0.7–1.3)
ERYTHROCYTE [DISTWIDTH] IN BLOOD BY AUTOMATED COUNT: 13.1 % (ref 10–15)
GFR SERPL CREATININE-BSD FRML MDRD: >90 ML/MIN/1.73M2
GLUCOSE BLD-MCNC: 260 MG/DL (ref 70–125)
HCT VFR BLD AUTO: 31.9 % (ref 40–53)
HGB BLD-MCNC: 10.2 G/DL (ref 13.3–17.7)
MAGNESIUM SERPL-MCNC: 1.5 MG/DL (ref 1.8–2.6)
MCH RBC QN AUTO: 29 PG (ref 26.5–33)
MCHC RBC AUTO-ENTMCNC: 32 G/DL (ref 31.5–36.5)
MCV RBC AUTO: 91 FL (ref 78–100)
PHOSPHATE SERPL-MCNC: 3.1 MG/DL (ref 2.5–4.5)
PLATELET # BLD AUTO: 152 10E3/UL (ref 150–450)
POTASSIUM BLD-SCNC: 4.7 MMOL/L (ref 3.5–5)
PROT SERPL-MCNC: 6.7 G/DL (ref 6–8)
RBC # BLD AUTO: 3.52 10E6/UL (ref 4.4–5.9)
SODIUM SERPL-SCNC: 136 MMOL/L (ref 136–145)
WBC # BLD AUTO: 2.6 10E3/UL (ref 4–11)

## 2023-08-02 PROCEDURE — 85027 COMPLETE CBC AUTOMATED: CPT

## 2023-08-02 PROCEDURE — 82248 BILIRUBIN DIRECT: CPT

## 2023-08-02 PROCEDURE — 36415 COLL VENOUS BLD VENIPUNCTURE: CPT

## 2023-08-02 PROCEDURE — 84100 ASSAY OF PHOSPHORUS: CPT

## 2023-08-02 PROCEDURE — 83735 ASSAY OF MAGNESIUM: CPT

## 2023-08-02 PROCEDURE — 80197 ASSAY OF TACROLIMUS: CPT

## 2023-08-02 PROCEDURE — 80053 COMPREHEN METABOLIC PANEL: CPT

## 2023-08-03 ENCOUNTER — TELEPHONE (OUTPATIENT)
Dept: TRANSPLANT | Facility: CLINIC | Age: 28
End: 2023-08-03
Payer: COMMERCIAL

## 2023-08-03 DIAGNOSIS — Z94.4 LIVER REPLACED BY TRANSPLANT (H): ICD-10-CM

## 2023-08-03 LAB
TACROLIMUS BLD-MCNC: 4.2 UG/L (ref 5–15)
TME LAST DOSE: ABNORMAL H
TME LAST DOSE: ABNORMAL H

## 2023-08-03 RX ORDER — MYCOPHENOLATE MOFETIL 250 MG/1
250 CAPSULE ORAL 2 TIMES DAILY
Qty: 60 CAPSULE | Refills: 0 | Status: SHIPPED | OUTPATIENT
Start: 2023-08-03 | End: 2023-08-24

## 2023-08-03 NOTE — TELEPHONE ENCOUNTER
Call to Leticia, left message to call back.    Tacrolimus level 4.2, but looks like it was a 19 hour level. No dose change. Wean mycophenolate to 250 mg BID. Get labs in 1 week to follow alk phos and tac level.     Leticia called back and we reviewed the above. Also reviewed the importance of taking tacrolimus every 12 hours and getting labs 12 hours after evening/night dose. Leticia verbalized understanding.

## 2023-08-05 ENCOUNTER — HEALTH MAINTENANCE LETTER (OUTPATIENT)
Age: 28
End: 2023-08-05

## 2023-08-10 ENCOUNTER — LAB (OUTPATIENT)
Dept: LAB | Facility: CLINIC | Age: 28
End: 2023-08-10
Payer: COMMERCIAL

## 2023-08-10 DIAGNOSIS — Z94.4 LIVER REPLACED BY TRANSPLANT (H): ICD-10-CM

## 2023-08-10 DIAGNOSIS — K70.31 ALCOHOLIC CIRRHOSIS OF LIVER WITH ASCITES (H): Chronic | ICD-10-CM

## 2023-08-10 PROBLEM — I85.01 BLEEDING ESOPHAGEAL VARICES (H): Status: ACTIVE | Noted: 2023-04-04

## 2023-08-10 LAB
ALBUMIN SERPL-MCNC: 3.5 G/DL (ref 3.5–5)
ALP SERPL-CCNC: 181 U/L (ref 45–120)
ALT SERPL W P-5'-P-CCNC: 21 U/L (ref 0–45)
ANION GAP SERPL CALCULATED.3IONS-SCNC: 7 MMOL/L (ref 5–18)
AST SERPL W P-5'-P-CCNC: 26 U/L (ref 0–40)
BILIRUB DIRECT SERPL-MCNC: 0.2 MG/DL
BILIRUB SERPL-MCNC: 0.5 MG/DL (ref 0–1)
BUN SERPL-MCNC: 22 MG/DL (ref 8–22)
CALCIUM SERPL-MCNC: 10 MG/DL (ref 8.5–10.5)
CHLORIDE BLD-SCNC: 99 MMOL/L (ref 98–107)
CO2 SERPL-SCNC: 32 MMOL/L (ref 22–31)
CREAT SERPL-MCNC: 1.26 MG/DL (ref 0.7–1.3)
ERYTHROCYTE [DISTWIDTH] IN BLOOD BY AUTOMATED COUNT: 13.1 % (ref 10–15)
GFR SERPL CREATININE-BSD FRML MDRD: 79 ML/MIN/1.73M2
GLUCOSE BLD-MCNC: 292 MG/DL (ref 70–125)
HCT VFR BLD AUTO: 33.2 % (ref 40–53)
HGB BLD-MCNC: 10.5 G/DL (ref 13.3–17.7)
MAGNESIUM SERPL-MCNC: 1.8 MG/DL (ref 1.8–2.6)
MCH RBC QN AUTO: 29.1 PG (ref 26.5–33)
MCHC RBC AUTO-ENTMCNC: 31.6 G/DL (ref 31.5–36.5)
MCV RBC AUTO: 92 FL (ref 78–100)
PHOSPHATE SERPL-MCNC: 3.6 MG/DL (ref 2.5–4.5)
PLATELET # BLD AUTO: 148 10E3/UL (ref 150–450)
POTASSIUM BLD-SCNC: 5.6 MMOL/L (ref 3.5–5)
PROT SERPL-MCNC: 6.9 G/DL (ref 6–8)
RBC # BLD AUTO: 3.61 10E6/UL (ref 4.4–5.9)
SODIUM SERPL-SCNC: 138 MMOL/L (ref 136–145)
TACROLIMUS BLD-MCNC: 9.2 UG/L (ref 5–15)
TME LAST DOSE: NORMAL H
TME LAST DOSE: NORMAL H
WBC # BLD AUTO: 2.9 10E3/UL (ref 4–11)

## 2023-08-10 PROCEDURE — 80321 ALCOHOLS BIOMARKERS 1OR 2: CPT

## 2023-08-10 PROCEDURE — 80053 COMPREHEN METABOLIC PANEL: CPT

## 2023-08-10 PROCEDURE — 85027 COMPLETE CBC AUTOMATED: CPT

## 2023-08-10 PROCEDURE — 84100 ASSAY OF PHOSPHORUS: CPT

## 2023-08-10 PROCEDURE — 80197 ASSAY OF TACROLIMUS: CPT

## 2023-08-10 PROCEDURE — 82248 BILIRUBIN DIRECT: CPT

## 2023-08-10 PROCEDURE — 36415 COLL VENOUS BLD VENIPUNCTURE: CPT

## 2023-08-10 PROCEDURE — 83735 ASSAY OF MAGNESIUM: CPT

## 2023-08-12 LAB
LABORATORY REPORT: NORMAL
PLPETH BLD-MCNC: <10 NG/ML
POPETH BLD-MCNC: <10 NG/ML

## 2023-08-16 ENCOUNTER — TELEPHONE (OUTPATIENT)
Dept: TRANSPLANT | Facility: CLINIC | Age: 28
End: 2023-08-16
Payer: COMMERCIAL

## 2023-08-16 NOTE — PROGRESS NOTES
Transplant Social Work Services Phone Call      Data: Dillon received a liver transplant on 2/28/2023.    Intervention: I attempted to reach Dillon but was unable.  I left a voice message on his mother Leticia's voice mail.  Assessment: Dillon had started intensive outpatient substance use treatment on 1/16/23 at [facility] but was unable to complete this program prior to transplant. He agreed to resume treatment post transplant.   Dillon was scheduled for a new ROBERT assessment in April of this year to allow for him to be referred to an outpatient treatment program but he cancelled this appointment because he was not feeling well.  I have sent multiple 1Castt message to Dillon and Leticia encouraging them to reschedule a ROBERT assessment but an assessment is still not scheduled.    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  PEth tests post transplant have all been negative.  Plan: I am awaiting a return call from patient and/or his mother to discuss the importance of following through on this recommendation for long-term sobriety.      CHRISTIAN Robledo, [facility]  Liver Transplant   Phone [phone]

## 2023-08-21 ENCOUNTER — PATIENT OUTREACH (OUTPATIENT)
Dept: GASTROENTEROLOGY | Facility: CLINIC | Age: 28
End: 2023-08-21
Payer: COMMERCIAL

## 2023-08-21 NOTE — PROGRESS NOTES
Attempted to reach Leticia to schedule patient's next ERCP. LVM with request to call clinic.     Marilu Levine, RN Care Coordinator

## 2023-08-24 ENCOUNTER — LAB (OUTPATIENT)
Dept: LAB | Facility: CLINIC | Age: 28
End: 2023-08-24
Payer: COMMERCIAL

## 2023-08-24 ENCOUNTER — TELEPHONE (OUTPATIENT)
Dept: TRANSPLANT | Facility: CLINIC | Age: 28
End: 2023-08-24

## 2023-08-24 DIAGNOSIS — Z94.4 LIVER REPLACED BY TRANSPLANT (H): ICD-10-CM

## 2023-08-24 LAB
ALBUMIN SERPL-MCNC: 3.7 G/DL (ref 3.5–5)
ALP SERPL-CCNC: 197 U/L (ref 45–120)
ALT SERPL W P-5'-P-CCNC: 21 U/L (ref 0–45)
ANION GAP SERPL CALCULATED.3IONS-SCNC: 9 MMOL/L (ref 5–18)
AST SERPL W P-5'-P-CCNC: 23 U/L (ref 0–40)
BILIRUB DIRECT SERPL-MCNC: 0.2 MG/DL
BILIRUB SERPL-MCNC: 0.7 MG/DL (ref 0–1)
BUN SERPL-MCNC: 20 MG/DL (ref 8–22)
CALCIUM SERPL-MCNC: 10.5 MG/DL (ref 8.5–10.5)
CHLORIDE BLD-SCNC: 102 MMOL/L (ref 98–107)
CO2 SERPL-SCNC: 31 MMOL/L (ref 22–31)
CREAT SERPL-MCNC: 1.14 MG/DL (ref 0.7–1.3)
ERYTHROCYTE [DISTWIDTH] IN BLOOD BY AUTOMATED COUNT: 14.4 % (ref 10–15)
GFR SERPL CREATININE-BSD FRML MDRD: 89 ML/MIN/1.73M2
GLUCOSE BLD-MCNC: 100 MG/DL (ref 70–125)
HCT VFR BLD AUTO: 33.8 % (ref 40–53)
HGB BLD-MCNC: 10.9 G/DL (ref 13.3–17.7)
MAGNESIUM SERPL-MCNC: 1.6 MG/DL (ref 1.8–2.6)
MCH RBC QN AUTO: 29.2 PG (ref 26.5–33)
MCHC RBC AUTO-ENTMCNC: 32.2 G/DL (ref 31.5–36.5)
MCV RBC AUTO: 91 FL (ref 78–100)
PHOSPHATE SERPL-MCNC: 3.3 MG/DL (ref 2.5–4.5)
PLATELET # BLD AUTO: 165 10E3/UL (ref 150–450)
POTASSIUM BLD-SCNC: 5.1 MMOL/L (ref 3.5–5)
PROT SERPL-MCNC: 7.6 G/DL (ref 6–8)
RBC # BLD AUTO: 3.73 10E6/UL (ref 4.4–5.9)
SODIUM SERPL-SCNC: 142 MMOL/L (ref 136–145)
WBC # BLD AUTO: 7.4 10E3/UL (ref 4–11)

## 2023-08-24 PROCEDURE — 83735 ASSAY OF MAGNESIUM: CPT

## 2023-08-24 PROCEDURE — 85027 COMPLETE CBC AUTOMATED: CPT

## 2023-08-24 PROCEDURE — 84100 ASSAY OF PHOSPHORUS: CPT

## 2023-08-24 PROCEDURE — 82248 BILIRUBIN DIRECT: CPT

## 2023-08-24 PROCEDURE — 82310 ASSAY OF CALCIUM: CPT

## 2023-08-24 PROCEDURE — 80197 ASSAY OF TACROLIMUS: CPT

## 2023-08-24 PROCEDURE — 36415 COLL VENOUS BLD VENIPUNCTURE: CPT

## 2023-08-24 NOTE — TELEPHONE ENCOUNTER
LFTs good. Alk phos continues to be a little elevated, but stable since last ERCP. AST/ALT good.  Call to Leticia to stop mycophenolate and get labs again next week. Reminded Leticia to call to schedule Dillon's next ERCP. She will call this afternoon.

## 2023-08-25 LAB
TACROLIMUS BLD-MCNC: 7.2 UG/L (ref 5–15)
TME LAST DOSE: NORMAL H
TME LAST DOSE: NORMAL H

## 2023-09-05 ENCOUNTER — PATIENT OUTREACH (OUTPATIENT)
Dept: GASTROENTEROLOGY | Facility: CLINIC | Age: 28
End: 2023-09-05
Payer: COMMERCIAL

## 2023-09-05 ENCOUNTER — TELEPHONE (OUTPATIENT)
Dept: TRANSPLANT | Facility: CLINIC | Age: 28
End: 2023-09-05
Payer: COMMERCIAL

## 2023-09-05 NOTE — TELEPHONE ENCOUNTER
Leticia called to update coordinator with plan for ERCP and labs. She is waiting for confirmation from Marilu with ERCP team for 10/4 ERCP. Initially stated that Dillon would get labs on 9/7. On 8/24 Dillon was supposed to stop MMF and get labs in a week, but has still be taking 250 mg BID. Informed her to stop now and get labs early next week. Leticia will make an appt for Tuesday or Wednesday, 9/12 or 13.

## 2023-09-14 ENCOUNTER — LAB (OUTPATIENT)
Dept: LAB | Facility: CLINIC | Age: 28
End: 2023-09-14
Payer: COMMERCIAL

## 2023-09-14 DIAGNOSIS — K70.31 ALCOHOLIC CIRRHOSIS OF LIVER WITH ASCITES (H): Chronic | ICD-10-CM

## 2023-09-14 DIAGNOSIS — Z94.4 LIVER REPLACED BY TRANSPLANT (H): ICD-10-CM

## 2023-09-14 LAB
ALBUMIN SERPL BCG-MCNC: 4 G/DL (ref 3.5–5.2)
ALP SERPL-CCNC: 213 U/L (ref 40–129)
ALT SERPL W P-5'-P-CCNC: 24 U/L (ref 0–70)
ANION GAP SERPL CALCULATED.3IONS-SCNC: 8 MMOL/L (ref 7–15)
AST SERPL W P-5'-P-CCNC: 32 U/L (ref 0–45)
BILIRUB DIRECT SERPL-MCNC: <0.2 MG/DL (ref 0–0.3)
BILIRUB SERPL-MCNC: 0.5 MG/DL
BUN SERPL-MCNC: 23.4 MG/DL (ref 6–20)
CALCIUM SERPL-MCNC: 9.8 MG/DL (ref 8.6–10)
CHLORIDE SERPL-SCNC: 96 MMOL/L (ref 98–107)
CREAT SERPL-MCNC: 1.16 MG/DL (ref 0.67–1.17)
DEPRECATED HCO3 PLAS-SCNC: 32 MMOL/L (ref 22–29)
EGFRCR SERPLBLD CKD-EPI 2021: 87 ML/MIN/1.73M2
ERYTHROCYTE [DISTWIDTH] IN BLOOD BY AUTOMATED COUNT: 14.7 % (ref 10–15)
GLUCOSE SERPL-MCNC: 118 MG/DL (ref 70–99)
HCT VFR BLD AUTO: 35.1 % (ref 40–53)
HGB BLD-MCNC: 11.6 G/DL (ref 13.3–17.7)
MAGNESIUM SERPL-MCNC: 1.9 MG/DL (ref 1.7–2.3)
MCH RBC QN AUTO: 29.6 PG (ref 26.5–33)
MCHC RBC AUTO-ENTMCNC: 33 G/DL (ref 31.5–36.5)
MCV RBC AUTO: 90 FL (ref 78–100)
PHOSPHATE SERPL-MCNC: 4.4 MG/DL (ref 2.5–4.5)
PLATELET # BLD AUTO: 198 10E3/UL (ref 150–450)
POTASSIUM SERPL-SCNC: 4.6 MMOL/L (ref 3.4–5.3)
PROT SERPL-MCNC: 7.1 G/DL (ref 6.4–8.3)
RBC # BLD AUTO: 3.92 10E6/UL (ref 4.4–5.9)
SODIUM SERPL-SCNC: 136 MMOL/L (ref 136–145)
WBC # BLD AUTO: 6.4 10E3/UL (ref 4–11)

## 2023-09-14 PROCEDURE — 80053 COMPREHEN METABOLIC PANEL: CPT

## 2023-09-14 PROCEDURE — 83735 ASSAY OF MAGNESIUM: CPT

## 2023-09-14 PROCEDURE — 82248 BILIRUBIN DIRECT: CPT

## 2023-09-14 PROCEDURE — 80321 ALCOHOLS BIOMARKERS 1OR 2: CPT

## 2023-09-14 PROCEDURE — 36415 COLL VENOUS BLD VENIPUNCTURE: CPT

## 2023-09-14 PROCEDURE — 80197 ASSAY OF TACROLIMUS: CPT

## 2023-09-14 PROCEDURE — 85027 COMPLETE CBC AUTOMATED: CPT

## 2023-09-14 PROCEDURE — 84100 ASSAY OF PHOSPHORUS: CPT

## 2023-09-15 ENCOUNTER — PATIENT OUTREACH (OUTPATIENT)
Dept: GASTROENTEROLOGY | Facility: CLINIC | Age: 28
End: 2023-09-15
Payer: COMMERCIAL

## 2023-09-15 LAB
TACROLIMUS BLD-MCNC: 7.3 UG/L (ref 5–15)
TME LAST DOSE: NORMAL H
TME LAST DOSE: NORMAL H

## 2023-09-15 NOTE — PROGRESS NOTES
Returned call to patient's mother to arrange follow up ERCP. Unable to reach at this time. LVM with request to contact clinic.     Marilu Levine RN Care Coordinator

## 2023-09-15 NOTE — PROGRESS NOTES
Spoke with patient's mother Leticia who agreed to schedule patient's next ERCP for 10/18/23. Discussed need for pre-op within 30 days. Patient has an appointment with Dr. Wood on 10/10/23.     Marilu Levine, RN Care Coordinator

## 2023-09-17 LAB
LABORATORY REPORT: NORMAL
PLPETH BLD-MCNC: <10 NG/ML
POPETH BLD-MCNC: <10 NG/ML

## 2023-09-25 ENCOUNTER — TELEPHONE (OUTPATIENT)
Dept: PHARMACY | Facility: CLINIC | Age: 28
End: 2023-09-25
Payer: COMMERCIAL

## 2023-09-25 NOTE — TELEPHONE ENCOUNTER
Clinical Pharmacy Consult:                                                      Transplant Specific: 6 month post transplant medication review  Date of Transplant: 2/28/23  Type of Transplant: liver  First Transplant: yes  History of rejection: no    Immunosuppression Regimen   TAC 2mg qAM & 2mg qPM, Prednisone 5mg qAM  Patient specific goal: 6-8  Most recent level: 7.3, date 6/28/23  Immunosuppressant Levels:  therapeutic  Pt adherent to lab draws: yes  Scr:   Lab Results   Component Value Date    CR 1.09 06/01/2023     Side effects: not known    Prophylactic Medications  Antibacterial:  Bactrim ss 3 times a week  Scheduled Discontinue Date: 6 months    Antifungal: Not needed thus far  Scheduled Discontinue Date: N/A    Antiviral: CrCl 40 to 59 mL/minute: Valcyte 450 mg once daily   Scheduled Discontinue Date: Therapy Complete    Acid Reducer: Protonix (pantoprazole)  Scheduled Reviewed Date:       Thrombosis Prevention: Aspirin 81 mg PO daily  Scheduled Discontinue Date:  managed by clinic    Blood Pressure Management  Frequency of home Blood Pressure checks: Couple times a week    Most recent home BP: 135/70     Patient Blood pressure goal:  140/90  Patient blood pressure at goal: yes       Hospitalizations/ER visits since last assessment: 0        Med rec/DUR performed: yes  Med Rec Discrepancies: no      Unable to reach pt after multiple attempts. Refill histories show adherence. Tac lab at goal. BP looks high. Rest of labs unremarkable.    Follow up in 6 months.    Valentin Sears, Pharm D  [facility]

## 2023-10-11 ENCOUNTER — PATIENT OUTREACH (OUTPATIENT)
Dept: GASTROENTEROLOGY | Facility: CLINIC | Age: 28
End: 2023-10-11
Payer: COMMERCIAL

## 2023-10-11 NOTE — PROGRESS NOTES
Called to discuss anesthesia requirement for pre-op assessment prior to procedure scheduled for 10/18. Unable to reach at this time. LVM with clinic contact information. Seculert message sent as well.     Marilu Levine RN Care Coordinator

## 2023-10-14 ENCOUNTER — HEALTH MAINTENANCE LETTER (OUTPATIENT)
Age: 28
End: 2023-10-14

## 2023-10-16 ENCOUNTER — PATIENT OUTREACH (OUTPATIENT)
Dept: GASTROENTEROLOGY | Facility: CLINIC | Age: 28
End: 2023-10-16
Payer: COMMERCIAL

## 2023-10-16 NOTE — PROGRESS NOTES
Attempted to reach to discuss pre-op requirement and upcoming procedure. LVM requesting call back to clinic.     Marilu Levine, RN Care Coordinator

## 2023-10-25 NOTE — TELEPHONE ENCOUNTER
FUTURE VISIT INFORMATION      SURGERY INFORMATION:  Date: 23  Location: uu or  Surgeon:  Guru Adeline Wright MD   Anesthesia Type:  general  Procedure: ENDOSCOPIC RETROGRADE CHOLANGIOPANCREATOGRAPHY, WITH REPLACEMENT OF TUBE OR STENT     RECORDS REQUESTED FROM:       Primary Care Provider: Gary Rodrigues MD  - [facility]    Pertinent Medical History: hypertension    Most recent EKG+ Tracin23    Most recent ECHO: 23    Most recent Cardiac Stress Test: 23

## 2023-11-01 ENCOUNTER — VIRTUAL VISIT (OUTPATIENT)
Dept: SURGERY | Facility: CLINIC | Age: 28
End: 2023-11-01
Payer: COMMERCIAL

## 2023-11-01 ENCOUNTER — ANESTHESIA EVENT (OUTPATIENT)
Dept: SURGERY | Facility: CLINIC | Age: 28
End: 2023-11-01
Payer: COMMERCIAL

## 2023-11-01 ENCOUNTER — PRE VISIT (OUTPATIENT)
Dept: SURGERY | Facility: CLINIC | Age: 28
End: 2023-11-01

## 2023-11-01 DIAGNOSIS — K83.1 BILIARY STRICTURE (H): ICD-10-CM

## 2023-11-01 DIAGNOSIS — Z01.818 PRE-OP EVALUATION: Primary | ICD-10-CM

## 2023-11-01 PROCEDURE — 99204 OFFICE O/P NEW MOD 45 MIN: CPT | Mod: VID | Performed by: PHYSICIAN ASSISTANT

## 2023-11-01 ASSESSMENT — ENCOUNTER SYMPTOMS: SEIZURES: 0

## 2023-11-01 NOTE — PROGRESS NOTES
Dillon is a 28 year old who is being evaluated via a billable video visit.      How would you like to obtain your AVS? [address]   Dillon is a 28 year old, presenting for the following health issues:  Pre-Op Exam (/)                        ROBER Lujan LPN

## 2023-11-01 NOTE — PATIENT INSTRUCTIONS
Preparing for Your Surgery      Name:  Dillon Salter   MRN:  [id number]   :  1995   Today's Date:  2023       Arriving for surgery:  Surgery date:  23  Arrival time:  05:30 am      Please come to:         [facility] [address]  [address] [place]  [address]      The [facility] Patient /Visitor Ramp is located at [address]. Patients and visitors who self-park will receive the reduced hospital parking rate. If the Patient /Visitor Ramp is full, please follow the signs to the [employer] parking located at the main hospital entrance.     parking is available ( 24 hours/ 7 days a week)    Discounted parking pass options are available for patients and visitors. They can be purchased at the [address] [address] desk at the main hospital entrance.    -    Stop at the security desk and they will direct surgery patients to the 3rd floor Surgery Waiting Room. [phone]     -  If you are in need of directions, wheelchair or escort please stop at the Information/security desk in the lobby.       What can I eat or drink?  -  You may eat and drink normally up to 8 hours prior to arrival time.  -  You may have clear liquids until 2 hours prior to arrival time    Examples of clear liquids:  Water  Clear broth  Juices (apple, white grape, white cranberry  and cider) without pulp  Noncarbonated, powder based beverages  (lemonade and Jefry-Aid)  Sodas (Sprite, 7-Up, ginger ale and seltzer)  Coffee or tea (without milk or cream)  Gatorade    -  No Alcohol or cannabis products for at least 24 hours before surgery.     Which medicines can I take?    Hold Multivitamins for 7 days before surgery.  Hold Supplements for 7 days before surgery.  Hold Ibuprofen (Advil, Motrin) for 1 day(s) before surgery--unless otherwise directed by surgeon.  Hold Naproxen (Aleve) for 4 days before surgery.    -  DO NOT take these medications the day of  surgery:  Novolog (aspart) insulin.    -  PLEASE TAKE these medications the day of surgery:  Tylenol or oxycodone if needed; take other scheduled morning medications.    How do I prepare myself?  - Please take 2 showers (one the night prior to surgery and one the morning of surgery) using Scrubcare or Hibiclens soap.    Use this soap only from the neck to your toes.     Leave the soap on your skin for one minute--then rinse thoroughly.      You may use your own shampoo and conditioner. No other hair products.   - Please remove all jewelry and body piercings.  - No lotions, deodorants or fragrance.  - No makeup or fingernail polish.   - Bring your ID and insurance card.    -If you have a Deep Brain Stimulator, Spinal Cord Stimulator, or any Neuro Stimulator device---you must bring the remote control to the hospital.      ALL PATIENTS GOING HOME THE SAME DAY OF SURGERY ARE REQUIRED TO HAVE A RESPONSIBLE ADULT TO DRIVE AND BE IN ATTENDANCE WITH THEM FOR 24 HOURS FOLLOWING SURGERY.    Covid testing policy as of 12/06/2022  Your surgeon will notify and schedule you for a COVID test if one is needed before surgery--please direct any questions or COVID symptoms to your surgeon      Questions or Concerns:    - For any questions regarding the day of surgery or your hospital stay, please contact the Pre Admission Nursing Office at [phone].       - If you have health changes between today and your surgery, please call your surgeon.       - For questions after surgery, please call your surgeons office.           Current Visitor Guidelines    You may have 2 visitors in the pre op area.    Visiting hours: 8 a.m. to 8:30 p.m.    You may have four visitors during your inpatient hospital stay.    Patients confirmed or suspected to have symptoms of COVID 19 or flu:     No visitors allowed for adult patients.   Children (under age 18) can have 1 named visitor.     People who are sick or showing symptoms of COVID 19 or flu:    Are  not allowed to visit patients--we can only make exceptions in special situations.       Please follow these guidelines for your visit:          Please maintain social distance          Masking is optional--however at times you may be asked to wear a mask for the safety of yourself and others     Clean your hands with alcohol hand . Do this when you arrive at and leave the building and patient room,    And again after you touch your mask or anything in the room.     Go directly to and from the room you are visiting.     Stay in the patient s room during your visit. Limit going to other places in the hospital as much as possible     Leave bags and jackets at home or in the car.     For everyone s health, please don t come and go during your visit. That includes for smoking   during your visit.

## 2023-11-01 NOTE — H&P
Pre-Operative H & P     CC:  Preoperative exam to assess for increased cardiopulmonary risk while undergoing surgery and anesthesia.    Date of Encounter: 11/1/2023  Primary Care Physician:  Gary Rodrigues     Reason for visit:   Encounter Diagnoses   Name Primary?    Pre-op evaluation Yes    Biliary stricture (H28)        HPI  Dillon Salter is a 28 year old male who presents for pre-operative H & P in preparation for  Procedure Information       Case: [id number] Date/Time: 11/08/23 0730    Procedure: ENDOSCOPIC RETROGRADE CHOLANGIOPANCREATOGRAPHY, WITH REPLACEMENT OF TUBE OR STENT (Mouth)    Anesthesia type: General    Diagnosis: Biliary stricture (H28) [K83.1]    Pre-op diagnosis: Biliary stricture (H28) [K83.1]    Location:  OR [address] [place] [address] OR    Providers: Guru Adeline Wright MD            Patient is being evaluated for comorbid conditions of HTN, anemia, neuropathy, chronic pain, type 2 diabetes, autism spectrum disorder, and depression.    He has a history of of alcoholic cirrhosis s/p liver transplant 2/28/2023. His posttransplant course has been complicated by development of a biliary stricture requiring multiple ERCPs and stenting. He was last seen by his GI team on 7/21/23. He is scheduled for repeat ERCP as above.     History is obtained from the patient and chart review. He is accompanied by his mom, Leticia, haleigh.    Hx of abnormal bleeding or anti-platelet use: ASA 81 mg      Past Medical History  Past Medical History:   Diagnosis Date    Acute on chronic anemia 04/08/2022    Alcohol use disorder     Alcohol use disorder, severe, dependence (H) 07/11/2022    Alcoholic cirrhosis of liver with ascites (H)     Alcoholic hepatitis (H28)     Autism spectrum disorder 04/08/2022    High functioning autistic.  26-year-old history of autism/Asperger's on the spectrum graduated high school at [employer] worked at [employer] and then another  place until the Covid epidemic in 2020 lives with  parents (Leticia and Wes) socially isolated drinks alcohol beer daily     Benign essential hypertension     Bleeding esophageal varices (H) 06/18/2022    Hepatic encephalopathy (H)     Hyponatremia 07/28/2022    Major depressive disorder     Neuropathic pain     Status post liver transplantation (H) 02/28/2023    Type II Diabetes 2012       Past Surgical History  Past Surgical History:   Procedure Laterality Date    BENCH LIVER  2/28/2023    Procedure: Bench liver;  Surgeon: Thelma Sterling MD;  Location: [facility] OR    COLONOSCOPY N/A 1/27/2023    Procedure: Colonoscopy;  Surgeon: Osman Montoya MD;  Location: [facility] OR    ENDOSCOPIC RETROGRADE CHOLANGIOPANCREATOGRAM N/A 5/24/2023    Procedure: Endoscopic retrograde cholangiopancreatogram. Biliary sphincterotomy, dilation, abnd stent placement;  Surgeon: Jarrell Mullins MD;  Location: [facility] OR    ENDOSCOPIC RETROGRADE CHOLANGIOPANCREATOGRAPHY, EXCHANGE TUBE/STENT N/A 7/24/2023    Procedure: ENDOSCOPIC RETROGRADE CHOLANGIOPANCREATOGRAPHY, WITH REPLACEMENT OF STENT, stone and sludge removal;  Surgeon: Guru Adeline Wright MD;  Location: [facility] OR    ENDOSCOPIC ULTRASOUND LOWER GASTROINTESTIONAL TRACT (GI) N/A 1/27/2023    Procedure: ULTRASOUND, LOWER GI TRACT, ENDOSCOPIC with glueing;  Surgeon: Osman Montoya MD;  Location: [facility] OR    ESOPHAGOSCOPY, GASTROSCOPY, DUODENOSCOPY (EGD), COMBINED N/A 5/24/2022    Procedure: ESOPHAGOGASTRODUODENOSCOPY (EGD) with esophageal banding;  Surgeon: Gary Hurst MD;  Location: [facility] OR    ESOPHAGOSCOPY, GASTROSCOPY, DUODENOSCOPY (EGD), COMBINED N/A 6/20/2022    Procedure: ESOPHAGOGASTRODUODENOSCOPY;  Surgeon: Gavino Reza MD;  Location: [facility] OR    ESOPHAGOSCOPY, GASTROSCOPY, DUODENOSCOPY (EGD), COMBINED N/A 1/23/2023    Procedure: ESOPHAGOGASTRODUODENOSCOPY (EGD) with esophageal variceal banding;  Surgeon: Juan Boo MD;  Location: [facility] OR    ESOPHAGOSCOPY,  GASTROSCOPY, DUODENOSCOPY (EGD), COMBINED N/A 2023    Procedure: ESOPHAGOGASTRODUODENOSCOPY (EGD);  Surgeon: Juan Boo MD;  Location: [facility] OR    ESOPHAGOSCOPY, GASTROSCOPY, DUODENOSCOPY (EGD), COMBINED N/A 2023    Procedure: Esophagoscopy, gastroscopy, duodenoscopy (EGD), combined;  Surgeon: Osman Montoya MD;  Location: [facility] OR    ESOPHAGOSCOPY, GASTROSCOPY, DUODENOSCOPY (EGD), COMBINED N/A 2023    Procedure: ESOPHAGOGASTRODUODENOSCOPY (EGD);  Surgeon: Leventhal, Thomas Michael, MD;  Location: [facility] OR    IR CVC NON TUNNEL LINE REMOVAL  3/10/2023    IR CVC TUNNEL PLACEMENT > 5 YRS OF AGE  3/10/2023    IR CVC TUNNEL REMOVAL RIGHT  2023    IR LIVER BIOPSY PERCUTANEOUS  2023    MIDLINE DOUBLE LUMEN PLACEMENT  2022         PICC TRIPLE LUMEN PLACEMENT  2022         RETURN LIVER TRANSPLANT N/A 3/1/2023    Procedure: RETURN TO OPERATING ROOM, AFTER LIVER TRANSPLANT;  Surgeon: Thelma Sterling MD;  Location: [facility] OR    TRANSPLANT LIVER RECIPIENT  DONOR N/A 2023    Procedure: Transplant liver recipient  donor;  Surgeon: Thelma Sterling MD;  Location: [facility] OR       Prior to Admission Medications  Current Outpatient Medications   Medication Sig Dispense Refill     magnesium glycinate lysinate chelate (DOCTOR'S BEST) 100 MG TABS tablet Take 2 tablets (200 mg) by mouth 2 times daily 360 tablet 3    acetaminophen (TYLENOL) 325 MG tablet 2 tablets (650 mg) by Oral or Feeding Tube route every 8 hours as needed for mild pain or fever 100 tablet 0    aspirin (ASA) 81 MG EC tablet Take 1 tablet (81 mg) by mouth daily (Patient taking differently: Take 81 mg by mouth every morning) 30 tablet 0    FLUoxetine (PROZAC) 20 MG capsule Take 3 capsules (60 mg) by mouth At Bedtime 90 capsule 0    insulin glargine (LANTUS PEN) 100 UNIT/ML pen Inject 15 Units Subcutaneous every morning AND 10 Units At Bedtime. (Patient taking differently: 18 units at Bedtime ) 15 mL 0     metoprolol tartrate (LOPRESSOR) 25 MG tablet Take 1 tablet (25 mg) by mouth 2 times daily 60 tablet 1    oxyCODONE (ROXICODONE) 5 MG tablet Take 5 mg by mouth every 6 hours as needed for severe pain From PCP      pantoprazole (PROTONIX) 40 MG EC tablet Take 1 tablet (40 mg) by mouth 2 times daily 180 tablet 3    predniSONE (DELTASONE) 5 MG tablet Take 1 tablet (5 mg) by mouth daily (Patient taking differently: Take 5 mg by mouth every morning) 90 tablet 1    sulfamethoxazole-trimethoprim (BACTRIM) 400-80 MG tablet Take 1 tablet by mouth three times a week Increase as instructed per transplant team (Patient taking differently: Take 1 tablet by mouth three times a week Increase as instructed per transplant team  M-T-W) 36 tablet 3    tacrolimus (GENERIC EQUIVALENT) 0.5 MG capsule Take 1 capsule (0.5 mg) by mouth as needed (For dose changes) (Patient taking differently: Take 0.5 mg by mouth 2 times daily) 180 capsule 1    ursodiol (ACTIGALL) 300 MG capsule Take 1 capsule (300 mg) by mouth 2 times daily 180 capsule 3    Vitamin D3 (CHOLECALCIFEROL) 25 mcg (1000 units) tablet 1 tablet (25 mcg) by Oral or Feeding Tube route daily (Patient taking differently: 25 mcg by Oral or Feeding Tube route every morning) 90 tablet 3    gabapentin (NEURONTIN) 100 MG capsule Take 100 mg by mouth (Patient not taking: Reported on 11/1/2023)      insulin aspart (NOVOLOG FLEXPEN) 100 UNIT/ML pen 1 unit per 10 grams of carb, plus additional units based on following scale   For Pre-Meal  - 164 give 1 unit. For Pre-Meal  - 189 give 2 units. For Pre-Meal  - 214 give 3 units. For Pre-Meal  - 239 give 4 units. For Pre-Meal  - 264 give 5 units. For Pre-Meal  - 289 give 6 units. For Pre-Meal  - 314 give 7 units. For Pre-Meal  - 339 give 8 units. For Pre-Meal  - 364 give 9 units.  For Pre-Meal BG greater than or equal to 365 give 10 units (Patient not taking: Reported on 11/1/2023) 15 mL 3     multivitamin w/minerals (THERA-VIT-M) tablet Take 1 tablet by mouth daily (Patient not taking: Reported on 11/1/2023)      tacrolimus (GENERIC EQUIVALENT) 1 MG capsule Take 2 capsules (2 mg) by mouth 2 times daily 360 capsule 3       Allergies  No Known Allergies    Social History  Social History     Socioeconomic History    Marital status: Single     Spouse name: Not on file    Number of children: Not on file    Years of education: Not on file    Highest education level: Not on file   Occupational History    Not on file   Tobacco Use    Smoking status: Former     Types: Cigarettes, Vaping Device     Passive exposure: Past    Smokeless tobacco: Never    Tobacco comments:     A pack lasted a year, hardly smoked   Vaping Use    Vaping Use: Former   Substance and Sexual Activity    Alcohol use: Not Currently     Comment: No ETOH since 4/6/22    Drug use: Not Currently     Types: Marijuana    Sexual activity: Not on file   Other Topics Concern    Not on file   Social History Narrative    26-year-old history of autism/Asperger's on the spectrum graduated high school at [employer] worked at  and then another  place until the Covid epidemic in 2020 lives with parents (Leticia and Wes) socially isolated drinks alcohol beer daily        End-stage cirrhosis noted on admission to  April 2022     Social Determinants of Health     Financial Resource Strain: Not on file   Food Insecurity: Not on file   Transportation Needs: Not on file   Physical Activity: Not on file   Stress: Not on file   Social Connections: Not on file   Interpersonal Safety: Not on file   Housing Stability: Not on file       Family History  Family History   Problem Relation Age of Onset    Hypertension Mother     Substance Abuse Mother     Thyroid Disease Mother     Heart Failure Father     Substance Abuse Father     Chronic Obstructive Pulmonary Disease Father     Substance Abuse Maternal Grandfather     Deep Vein Thrombosis (DVT) Paternal Uncle      Anesthesia Reaction No family hx of        Review of Systems  The complete review of systems is negative other than noted in the HPI or here.   Anesthesia Evaluation   Pt has had prior anesthetic.     No history of anesthetic complications       ROS/MED HX  ENT/Pulmonary:  - neg pulmonary ROS     Neurologic: Comment: Hx of hepatic encephalopathy    (+) delerium (after transplant 2/2023), resolved Yes.  peripheral neuropathy,                         (-) no seizures, no CVA and no TIA   Cardiovascular: Comment: Denies chest pain/pressure, GONZALEZ, orthopnea, dizziness, palpitations, edema.     (+)  hypertension- -   -  - -                                 Previous cardiac testing   Echo: Date: Results:    Stress Test:  Date: 2/22/2023 Results:  Interpretation Summary  Normal dobutamine stress echocardiogram without evidence of inducible  ischemia. Target heart rate was achieved. Heart rate and blood pressure  response to dobutamine were normal. Normal LV function and wall motion at  rest. With stress, the left ventricular ejection fraction increased from 55-  60% to greater than 65% and the left ventricular size decreased appropriately.  No regional wall motion abnormality with stress.  No subjective symptoms to suggest ischemia.  There was no ECG evidence of ischemia.  No significant valve disease on screening doppler evaluation. The aortic root  and visualized ascending aorta are normal.    ECG Reviewed:  Date: 5/18/2023 Results:  Sinus rhythm   Normal ECG   Cath:  Date: Results:   (-) CAD and dyslipidemia   METS/Exercise Tolerance: 4 - Raking leaves, gardening Comment: Able to ascend stairs without difficulty and help with all housework without exertional symptoms.    Hematologic:     (+)      anemia, history of blood transfusion, no previous transfusion reaction, Known PRBC Anitbodies:No    (-) history of blood clots   Musculoskeletal: Comment: Chronic intermittent cramping in lower extremities      GI/Hepatic:  Comment: S/p liver transplant  Biliary stricture    (+)   esophageal disease, Varices, hepatitis resolved      hepatitis type Alcoholic, liver disease,    (-) GERD   Renal/Genitourinary: Comment: Hyponatremia   (-) renal disease   Endo:     (+)  type II DM, Last HgA1c: 5.2, date: 3/12/2023, Using insulin, - not using insulin pump. Normal glucose range: 160-210,     Chronic steroid usage for Post Transplant Immunosuppression. Date most recently used: daily.     (-) thyroid disease and obesity   Psychiatric/Substance Use: Comment: Autism spectrum disorder    (+) psychiatric history depression alcohol abuse (none since 4/2022) H/O chronic opiod use .     Infectious Disease:  - neg infectious disease ROS     Malignancy:  - neg malignancy ROS     Other:  - neg other ROS    (+)  , H/O Chronic Pain (lower extremities and lower back),         Virtual visit -  No vitals were obtained    Physical Exam  Constitutional: Pleasant male, no apparent distress, and appears younger than stated age.  Eyes: Pupils equal  HENT: Normocephalic and atraumatic  Respiratory: Non labored breathing on room air  Neurologic: Awake, alert, oriented to name, place and time.   Neuropsychiatric: Calm, cooperative. Normal affect.        Prior Labs/Diagnostic Studies   All labs and imaging personally reviewed     EKG/ stress test - if available please see in ROS above   Echo result w/o MOPS: Interpretation SummaryGlobal and regional left ventricular function is normal with an EF of 60-65%.Global right ventricular function is normal.No significant valvular abnormalities present.IVC diameter >2.1 cm collapsing <50% with sniff suggests a high RA pressureestimated at 15 mmHg or greater.No pericardial effusion is present.Compared to prior, CVP is higher, no other change.      The patient's records and results personally reviewed by this provider.     Outside records reviewed from: Care Everywhere      Assessment  Dillon Salter is a 28 year old male seen  "as a PAC referral for risk assessment and optimization for anesthesia.    Plan/Recommendations  Pt will be optimized for the proposed procedure.  See below for details on the assessment, risk, and preoperative recommendations    NEUROLOGY  - No history of TIA, CVA or seizure  - History of post-operative delirium following liver transplant  - History of alcoholic encephalopathy, no recurrence since transplant  - Chronic Pain  On chronic opiates, morphine equivilant = 30 MME/Day   -Post Op delirium risk factors:  High co-morbid index and History of prior delirium    ENT  - No current airway concerns.  Will need to be reassessed day of surgery.  Mallampati: Unable to assess  TM: Unable to assess    CARDIAC  - No history of CAD and Afib  - Hypertension  Well controlled. Continue metoprolol DOS.  - Patient denies exertional symptoms. Most recent cardiac testing as above.    - METS (Metabolic Equivalents)  Patient performs 4 or more METS exercise without symptoms            Total Score: 0      RCRI-Low risk: Class 2 0.9% complication rate            Total Score: 1    RCRI: Diabetes        PULMONARY    KANA Low Risk            Total Score: 2    KANA: Hypertension    KANA: Male      - Denies asthma or inhaler use  - Tobacco History    History   Smoking Status    Former    Types: Cigarettes, Vaping Device   Smokeless Tobacco    Never       GI  - History of alcoholic cirrhosis s/p liver transplant 2/2023. Doing well post-transplant however course has been complicated by biliary stricture. Repeat ERCP as above.  - Last visit with GI team 7/2023  - History of esophageal varices    PONV Low Risk  Total Score: 1           1 AN PONV: Patient is not a current smoker        /RENAL  - Baseline Creatinine  1.16    ENDOCRINE    - BMI: Estimated body mass index is 25.64 kg/m  as calculated from the following:    Height as of 7/24/23: 1.651 m (5' 5\").    Weight as of 7/24/23: 69.9 kg (154 lb 1.6 oz).  Overweight (BMI 25.0-29.9)  - " Diabetes  Hemoglobin A1C (%)   Date Value   03/12/2023 5.2     Diabetes Type 2, insulin dependent. Patient reports rarely monitoring BG at home but when he does check BG averages between 160-210. Hold morning oral hypoglycemic medications and short acting insulin DOS. Take 80% of last scheduled dose of long-acting insulin prior to procedure.  Recommend close monitoring of the patient's blood glucose levels throughout the perioperative period.    HEME  VTE Medium Risk 1.8%            Total Score: 6    VTE: Male    VTE: Family Hx of VTE      - Platelet disfunction secondary to Aspirin (Padmini, many others)  - Chronic anemia  Recommend perioperative use of blood conservation techniques intraoperatively and close monitoring for postoperative bleeding.  - Patient reports history of multiple blood transfusions. No known transfusion reaction or antibodies.     MSK  - Chronic back and lower extremity pain. Recommend consideration for careful positioning to limit patient discomfort.    PSYCH/SUBSTANCE  - Autism spectrum disorder, depression. Continue Prozac  - History of severe alcohol use disorder. No use since 4/2022    Different anesthesia methods/types have been discussed with the patient, but they are aware that the final plan will be decided by the assigned anesthesia provider on the date of service.    The patient is optimized for their procedure. AVS with information on surgery time/arrival time, meds and NPO status given by nursing staff. No further diagnostic testing indicated.    Please refer to the physical examination documented by the anesthesiologist in the anesthesia record on the day of surgery.    Video-Visit Details    Type of service:  Video Visit    Provider received verbal consent for a Video Visit from the patient? Yes   Video Start Time:  1608  Video End Time: 1628    Originating Location (pt. Location): Home    Distant Location (provider location):  Off-site  Mode of Communication:  Video Conference  via [employer]  On the day of service:     Prep time: 20 minutes  Visit time: 20 minutes  Documentation time: 15 minutes  ------------------------------------------  Total time: 55 minutes      Urmila Lucio PA-C  Preoperative Assessment Center  [facility]  Phone: [phone]  Fax: [phone]

## 2023-11-07 NOTE — ANESTHESIA PREPROCEDURE EVALUATION
Anesthesia Pre-Procedure Evaluation    Patient: Dillon Salter   MRN: [id number] : 1995        Procedure : Procedure(s):  ENDOSCOPIC RETROGRADE CHOLANGIOPANCREATOGRAPHY, WITH REPLACEMENT OF TUBE OR STENT          Past Medical History:   Diagnosis Date    Acute on chronic anemia 2022    Alcohol use disorder     Alcohol use disorder, severe, dependence (H) 2022    Alcoholic cirrhosis of liver with ascites (H)     Alcoholic hepatitis (H28)     Autism spectrum disorder 2022    High functioning autistic.  26-year-old history of autism/Asperger's on the spectrum graduated high school at [employer] worked at [employer] and then another  place until the Covid epidemic in  lives with parents (Leticia and Wes) socially isolated drinks alcohol beer daily     Benign essential hypertension     Bleeding esophageal varices (H) 2022    Hepatic encephalopathy (H)     Hyponatremia 2022    Major depressive disorder     Neuropathic pain     Status post liver transplantation (H) 2023    Type II Diabetes       Past Surgical History:   Procedure Laterality Date    BENCH LIVER  2023    Procedure: Bench liver;  Surgeon: Thelma Sterling MD;  Location: [facility]    COLONOSCOPY N/A 2023    Procedure: Colonoscopy;  Surgeon: Osman Montoya MD;  Location: [facility]    ENDOSCOPIC RETROGRADE CHOLANGIOPANCREATOGRAM N/A 2023    Procedure: Endoscopic retrograde cholangiopancreatogram. Biliary sphincterotomy, dilation, abnd stent placement;  Surgeon: Jarrell Mullins MD;  Location: [facility] OR    ENDOSCOPIC RETROGRADE CHOLANGIOPANCREATOGRAPHY, EXCHANGE TUBE/STENT N/A 2023    Procedure: ENDOSCOPIC RETROGRADE CHOLANGIOPANCREATOGRAPHY, WITH REPLACEMENT OF STENT, stone and sludge removal;  Surgeon: Guru Adeline Wright MD;  Location: [facility] OR    ENDOSCOPIC ULTRASOUND LOWER GASTROINTESTIONAL TRACT (GI) N/A 2023    Procedure: ULTRASOUND, LOWER GI TRACT, ENDOSCOPIC with  glueing;  Surgeon: Osman Montoya MD;  Location: [facility]    ESOPHAGOSCOPY, GASTROSCOPY, DUODENOSCOPY (EGD), COMBINED N/A 2022    Procedure: ESOPHAGOGASTRODUODENOSCOPY (EGD) with esophageal banding;  Surgeon: Gary Hurst MD;  Location: [facility] Main OR    ESOPHAGOSCOPY, GASTROSCOPY, DUODENOSCOPY (EGD), COMBINED N/A 2022    Procedure: ESOPHAGOGASTRODUODENOSCOPY;  Surgeon: Gavino Reza MD;  Location: [facility] OR    ESOPHAGOSCOPY, GASTROSCOPY, DUODENOSCOPY (EGD), COMBINED N/A 2023    Procedure: ESOPHAGOGASTRODUODENOSCOPY (EGD) with esophageal variceal banding;  Surgeon: Juan Boo MD;  Location: [facility] Main OR    ESOPHAGOSCOPY, GASTROSCOPY, DUODENOSCOPY (EGD), COMBINED N/A 2023    Procedure: ESOPHAGOGASTRODUODENOSCOPY (EGD);  Surgeon: Juan Boo MD;  Location: [facility] Main OR    ESOPHAGOSCOPY, GASTROSCOPY, DUODENOSCOPY (EGD), COMBINED N/A 2023    Procedure: Esophagoscopy, gastroscopy, duodenoscopy (EGD), combined;  Surgeon: Osman Montoya MD;  [facility]: [facility]    ESOPHAGOSCOPY, GASTROSCOPY, DUODENOSCOPY (EGD), COMBINED N/A 2023    Procedure: ESOPHAGOGASTRODUODENOSCOPY (EGD);  Surgeon: Leventhal, Thomas Michael, MD;  Location: [facility] OR    IR CVC NON TUNNEL LINE REMOVAL  3/10/2023    IR CVC TUNNEL PLACEMENT > 5 YRS OF AGE  3/10/2023    IR CVC TUNNEL REMOVAL RIGHT  2023    IR LIVER BIOPSY PERCUTANEOUS  2023    MIDLINE DOUBLE LUMEN PLACEMENT  2022         PICC TRIPLE LUMEN PLACEMENT  2022         RETURN LIVER TRANSPLANT N/A 3/1/2023    Procedure: RETURN TO OPERATING ROOM, AFTER LIVER TRANSPLANT;  Surgeon: Thelma Sterling MD;  Location: [facility] OR    TRANSPLANT LIVER RECIPIENT  DONOR N/A 2023    Procedure: Transplant liver recipient  donor;  Surgeon: Thelma Sterling MD;  Location:  OR      No Known Allergies   Social History     Tobacco Use    Smoking status: Former     Types: Cigarettes, Vaping Device      Passive exposure: Past    Smokeless tobacco: Never    Tobacco comments:     A pack lasted a year, hardly smoked   Substance Use Topics    Alcohol use: Not Currently     Comment: No ETOH since 4/6/22      Wt Readings from Last 1 Encounters:   07/24/23 69.9 kg (154 lb 1.6 oz)        Anesthesia Evaluation            ROS/MED HX  ENT/Pulmonary:       Neurologic: Comment: Autism spectrum      Cardiovascular:     (+)  hypertension- -   -  - -                                      METS/Exercise Tolerance:     Hematologic:       Musculoskeletal:       GI/Hepatic: Comment: Alcoholic liver Cirrhosis s/p liver transplant complicated by biliary stricture      Renal/Genitourinary:       Endo:     (+)  type II DM,                    Psychiatric/Substance Use:       Infectious Disease:       Malignancy:       Other:      (+)  , H/O Chronic Pain,         Physical Exam    Airway        Mallampati: II   TM distance: > 3 FB   Neck ROM: full   Mouth opening: < 3 cm    Respiratory Devices and Support         Dental       (+) Minor Abnormalities - some fillings, tiny chips      Cardiovascular   cardiovascular exam normal       Rhythm and rate: regular and normal     Pulmonary   pulmonary exam normal        breath sounds clear to auscultation           OUTSIDE LABS:  CBC:   Lab Results   Component Value Date    WBC 6.4 09/14/2023    WBC 7.4 08/24/2023    HGB 11.6 (L) 09/14/2023    HGB 10.9 (L) 08/24/2023    HCT 35.1 (L) 09/14/2023    HCT 33.8 (L) 08/24/2023     09/14/2023     08/24/2023     BMP:   Lab Results   Component Value Date     09/14/2023     08/24/2023    POTASSIUM 4.6 09/14/2023    POTASSIUM 5.1 (H) 08/24/2023    CHLORIDE 96 (L) 09/14/2023    CHLORIDE 102 08/24/2023    CO2 32 (H) 09/14/2023    CO2 31 08/24/2023    BUN 23.4 (H) 09/14/2023    BUN 20 08/24/2023    CR 1.16 09/14/2023    CR 1.14 08/24/2023     (H) 09/14/2023     08/24/2023     COAGS:   Lab Results   Component Value Date    PTT 30  "03/07/2023    INR 0.98 07/24/2023    FIBR 375 03/07/2023     POC: No results found for: \"BGM\", \"HCG\", \"HCGS\"  HEPATIC:   Lab Results   Component Value Date    ALBUMIN 4.0 09/14/2023    PROTTOTAL 7.1 09/14/2023    ALT 24 09/14/2023    AST 32 09/14/2023    ALKPHOS 213 (H) 09/14/2023    BILITOTAL 0.5 09/14/2023    DESI 11 (L) 03/05/2023     OTHER:   Lab Results   Component Value Date    PH 7.32 (L) 03/04/2023    LACT 0.7 03/06/2023    A1C 5.2 03/12/2023    SARTHAK 9.8 09/14/2023    PHOS 4.4 09/14/2023    MAG 1.9 09/14/2023    LIPASE 7 (L) 07/24/2023    AMYLASE 18 (L) 07/24/2023    TSH 1.09 07/30/2022       Anesthesia Plan    ASA Status:  3    NPO Status:  NPO Appropriate    Anesthesia Type: General.     - Airway: ETT   Induction: RSI.   Maintenance: Balanced.   Techniques and Equipment:     - Lines/Monitors: BIS     Consents    Anesthesia Plan(s) and associated risks, benefits, and realistic alternatives discussed. Questions answered and patient/representative(s) expressed understanding.     - Discussed: Risks, Benefits and Alternatives for the PROCEDURE were discussed     - Discussed with:  Patient            Postoperative Care    Pain management: IV analgesics, Oral pain medications, Multi-modal analgesia.   PONV prophylaxis: Ondansetron (or other 5HT-3), Promethazine or metoclopramide, Dexamethasone or Solumedrol     Comments:                Benito Townsend MD  "

## 2023-11-08 ENCOUNTER — HOSPITAL ENCOUNTER (OUTPATIENT)
Facility: CLINIC | Age: 28
Discharge: HOME | End: 2023-11-08
Attending: INTERNAL MEDICINE | Admitting: INTERNAL MEDICINE
Payer: COMMERCIAL

## 2023-11-08 ENCOUNTER — APPOINTMENT (OUTPATIENT)
Dept: GENERAL RADIOLOGY | Facility: CLINIC | Age: 28
End: 2023-11-08
Attending: INTERNAL MEDICINE
Payer: COMMERCIAL

## 2023-11-08 ENCOUNTER — ANESTHESIA (OUTPATIENT)
Dept: SURGERY | Facility: CLINIC | Age: 28
End: 2023-11-08
Payer: COMMERCIAL

## 2023-11-08 ENCOUNTER — PATIENT OUTREACH (OUTPATIENT)
Dept: ONCOLOGY | Facility: CLINIC | Age: 28
End: 2023-11-08
Payer: COMMERCIAL

## 2023-11-08 VITALS
OXYGEN SATURATION: 99 % | RESPIRATION RATE: 12 BRPM | DIASTOLIC BLOOD PRESSURE: 73 MMHG | TEMPERATURE: 98.1 F | HEART RATE: 74 BPM | HEIGHT: 68 IN | WEIGHT: 153 LBS | SYSTOLIC BLOOD PRESSURE: 110 MMHG | BODY MASS INDEX: 23.19 KG/M2

## 2023-11-08 DIAGNOSIS — Z94.4 LIVER REPLACED BY TRANSPLANT (H): ICD-10-CM

## 2023-11-08 LAB
ALBUMIN SERPL BCG-MCNC: 3.6 G/DL (ref 3.5–5.2)
ALP SERPL-CCNC: 487 U/L (ref 40–129)
ALT SERPL W P-5'-P-CCNC: 46 U/L (ref 0–70)
ANION GAP SERPL CALCULATED.3IONS-SCNC: 11 MMOL/L (ref 7–15)
AST SERPL W P-5'-P-CCNC: 29 U/L (ref 0–45)
BILIRUB SERPL-MCNC: 1 MG/DL
BUN SERPL-MCNC: 34.3 MG/DL (ref 6–20)
CALCIUM SERPL-MCNC: 9.9 MG/DL (ref 8.6–10)
CHLORIDE SERPL-SCNC: 96 MMOL/L (ref 98–107)
CREAT SERPL-MCNC: 1.22 MG/DL (ref 0.67–1.17)
DEPRECATED HCO3 PLAS-SCNC: 27 MMOL/L (ref 22–29)
EGFRCR SERPLBLD CKD-EPI 2021: 82 ML/MIN/1.73M2
ERCP: NORMAL
ERYTHROCYTE [DISTWIDTH] IN BLOOD BY AUTOMATED COUNT: 14.3 % (ref 10–15)
GLUCOSE BLDC GLUCOMTR-MCNC: 204 MG/DL (ref 70–99)
GLUCOSE BLDC GLUCOMTR-MCNC: 488 MG/DL (ref 70–99)
GLUCOSE SERPL-MCNC: 561 MG/DL (ref 70–99)
HCT VFR BLD AUTO: 41.4 % (ref 40–53)
HGB BLD-MCNC: 14.2 G/DL (ref 13.3–17.7)
INR PPP: 0.98 (ref 0.85–1.15)
LIPASE SERPL-CCNC: 9 U/L (ref 13–60)
MCH RBC QN AUTO: 29.6 PG (ref 26.5–33)
MCHC RBC AUTO-ENTMCNC: 34.3 G/DL (ref 31.5–36.5)
MCV RBC AUTO: 86 FL (ref 78–100)
PLATELET # BLD AUTO: 122 10E3/UL (ref 150–450)
POTASSIUM SERPL-SCNC: 4.6 MMOL/L (ref 3.4–5.3)
PROT SERPL-MCNC: 6.9 G/DL (ref 6.4–8.3)
RBC # BLD AUTO: 4.8 10E6/UL (ref 4.4–5.9)
SODIUM SERPL-SCNC: 134 MMOL/L (ref 135–145)
WBC # BLD AUTO: 4.6 10E3/UL (ref 4–11)

## 2023-11-08 PROCEDURE — 360N000082 HC SURGERY LEVEL 2 W/ FLUORO, PER MIN: Performed by: INTERNAL MEDICINE

## 2023-11-08 PROCEDURE — 250N000009 HC RX 250: Performed by: INTERNAL MEDICINE

## 2023-11-08 PROCEDURE — C1877 STENT, NON-COAT/COV W/O DEL: HCPCS | Performed by: INTERNAL MEDICINE

## 2023-11-08 PROCEDURE — 370N000017 HC ANESTHESIA TECHNICAL FEE, PER MIN: Performed by: INTERNAL MEDICINE

## 2023-11-08 PROCEDURE — 258N000003 HC RX IP 258 OP 636: Performed by: NURSE ANESTHETIST, CERTIFIED REGISTERED

## 2023-11-08 PROCEDURE — 250N000012 HC RX MED GY IP 250 OP 636 PS 637: Performed by: ANESTHESIOLOGY

## 2023-11-08 PROCEDURE — 82962 GLUCOSE BLOOD TEST: CPT

## 2023-11-08 PROCEDURE — 255N000002 HC RX 255 OP 636: Mod: JZ | Performed by: INTERNAL MEDICINE

## 2023-11-08 PROCEDURE — 272N000001 HC OR GENERAL SUPPLY STERILE: Performed by: INTERNAL MEDICINE

## 2023-11-08 PROCEDURE — 250N000011 HC RX IP 250 OP 636: Performed by: NURSE ANESTHETIST, CERTIFIED REGISTERED

## 2023-11-08 PROCEDURE — 258N000003 HC RX IP 258 OP 636: Performed by: ANESTHESIOLOGY

## 2023-11-08 PROCEDURE — G0480 DRUG TEST DEF 1-7 CLASSES: HCPCS | Mod: 90 | Performed by: PATHOLOGY

## 2023-11-08 PROCEDURE — 85027 COMPLETE CBC AUTOMATED: CPT | Performed by: INTERNAL MEDICINE

## 2023-11-08 PROCEDURE — 83690 ASSAY OF LIPASE: CPT | Performed by: INTERNAL MEDICINE

## 2023-11-08 PROCEDURE — 250N000009 HC RX 250: Performed by: NURSE ANESTHETIST, CERTIFIED REGISTERED

## 2023-11-08 PROCEDURE — 80053 COMPREHEN METABOLIC PANEL: CPT | Performed by: INTERNAL MEDICINE

## 2023-11-08 PROCEDURE — 999N000179 XR SURGERY CARM FLUORO LESS THAN 5 MIN W STILLS: Mod: TC

## 2023-11-08 PROCEDURE — C1726 CATH, BAL DIL, NON-VASCULAR: HCPCS | Performed by: INTERNAL MEDICINE

## 2023-11-08 PROCEDURE — 999N000141 HC STATISTIC PRE-PROCEDURE NURSING ASSESSMENT: Performed by: INTERNAL MEDICINE

## 2023-11-08 PROCEDURE — 250N000025 HC SEVOFLURANE, PER MIN: Performed by: INTERNAL MEDICINE

## 2023-11-08 PROCEDURE — 99000 SPECIMEN HANDLING OFFICE-LAB: CPT | Performed by: PATHOLOGY

## 2023-11-08 PROCEDURE — C1769 GUIDE WIRE: HCPCS | Performed by: INTERNAL MEDICINE

## 2023-11-08 PROCEDURE — 710N000012 HC RECOVERY PHASE 2, PER MINUTE: Performed by: INTERNAL MEDICINE

## 2023-11-08 PROCEDURE — 85610 PROTHROMBIN TIME: CPT | Performed by: INTERNAL MEDICINE

## 2023-11-08 PROCEDURE — 710N000009 HC RECOVERY PHASE 1, LEVEL 1, PER MIN: Performed by: INTERNAL MEDICINE

## 2023-11-08 DEVICE — A STERILE NON-BIOABSORBABLE TUBULAR DEVICE INTENDED TO BE IMPLANTED IN AN OBSTRUCTED PANCREATIC DUCT (E.G., DUE TO STRICTURE OR MALIGNANCY) TO MAINTAIN LUMINAL PATENCY; IT MAY ALSO BE INTENDED TO TREAT A WIDE VARIETY OF CONDITIONS IN PANCREATIC ENDOSCOPY (E.G., DRAIN PSEUDOCYST, TREAT FISTULA OR DUCT DISRUPTION). IT IS MADE ENTIRELY OF A SYNTHETIC POLYMER AND HAS A CONTINUOUS TUBE DESIGN WITH OR WITHOUT RETENTION FLANGES. THIS IS A SINGLE-USE DEVICE.
Type: IMPLANTABLE DEVICE | Site: BILE DUCT | Status: FUNCTIONAL
Brand: FREEMAN PANCREATIC FLEXI-STENT

## 2023-11-08 RX ORDER — HYDROMORPHONE HCL IN WATER/PF 6 MG/30 ML
0.2 PATIENT CONTROLLED ANALGESIA SYRINGE INTRAVENOUS EVERY 5 MIN PRN
Status: DISCONTINUED | OUTPATIENT
Start: 2023-11-08 | End: 2023-11-08 | Stop reason: HOSPADM

## 2023-11-08 RX ORDER — NALOXONE HYDROCHLORIDE 0.4 MG/ML
0.4 INJECTION, SOLUTION INTRAMUSCULAR; INTRAVENOUS; SUBCUTANEOUS
Status: DISCONTINUED | OUTPATIENT
Start: 2023-11-08 | End: 2023-11-08 | Stop reason: HOSPADM

## 2023-11-08 RX ORDER — SODIUM CHLORIDE, SODIUM LACTATE, POTASSIUM CHLORIDE, CALCIUM CHLORIDE 600; 310; 30; 20 MG/100ML; MG/100ML; MG/100ML; MG/100ML
INJECTION, SOLUTION INTRAVENOUS CONTINUOUS
Status: DISCONTINUED | OUTPATIENT
Start: 2023-11-08 | End: 2023-11-08 | Stop reason: HOSPADM

## 2023-11-08 RX ORDER — OXYCODONE HYDROCHLORIDE 10 MG/1
10 TABLET ORAL
Status: DISCONTINUED | OUTPATIENT
Start: 2023-11-08 | End: 2023-11-08 | Stop reason: HOSPADM

## 2023-11-08 RX ORDER — ONDANSETRON 4 MG/1
4 TABLET, ORALLY DISINTEGRATING ORAL EVERY 30 MIN PRN
Status: DISCONTINUED | OUTPATIENT
Start: 2023-11-08 | End: 2023-11-08 | Stop reason: HOSPADM

## 2023-11-08 RX ORDER — ONDANSETRON 2 MG/ML
4 INJECTION INTRAMUSCULAR; INTRAVENOUS EVERY 30 MIN PRN
Status: DISCONTINUED | OUTPATIENT
Start: 2023-11-08 | End: 2023-11-08 | Stop reason: HOSPADM

## 2023-11-08 RX ORDER — ONDANSETRON 2 MG/ML
4 INJECTION INTRAMUSCULAR; INTRAVENOUS EVERY 6 HOURS PRN
Status: DISCONTINUED | OUTPATIENT
Start: 2023-11-08 | End: 2023-11-08 | Stop reason: HOSPADM

## 2023-11-08 RX ORDER — IOPAMIDOL 510 MG/ML
INJECTION, SOLUTION INTRAVASCULAR PRN
Status: DISCONTINUED | OUTPATIENT
Start: 2023-11-08 | End: 2023-11-08 | Stop reason: HOSPADM

## 2023-11-08 RX ORDER — DEXTROSE MONOHYDRATE 25 G/50ML
25-50 INJECTION, SOLUTION INTRAVENOUS
Status: DISCONTINUED | OUTPATIENT
Start: 2023-11-08 | End: 2023-11-08 | Stop reason: HOSPADM

## 2023-11-08 RX ORDER — INDOMETHACIN 50 MG/1
SUPPOSITORY RECTAL PRN
Status: DISCONTINUED | OUTPATIENT
Start: 2023-11-08 | End: 2023-11-08 | Stop reason: HOSPADM

## 2023-11-08 RX ORDER — PROPOFOL 10 MG/ML
INJECTION, EMULSION INTRAVENOUS PRN
Status: DISCONTINUED | OUTPATIENT
Start: 2023-11-08 | End: 2023-11-08

## 2023-11-08 RX ORDER — LIDOCAINE 40 MG/G
CREAM TOPICAL
Status: DISCONTINUED | OUTPATIENT
Start: 2023-11-08 | End: 2023-11-08 | Stop reason: HOSPADM

## 2023-11-08 RX ORDER — NALOXONE HYDROCHLORIDE 0.4 MG/ML
0.2 INJECTION, SOLUTION INTRAMUSCULAR; INTRAVENOUS; SUBCUTANEOUS
Status: DISCONTINUED | OUTPATIENT
Start: 2023-11-08 | End: 2023-11-08 | Stop reason: HOSPADM

## 2023-11-08 RX ORDER — FENTANYL CITRATE 50 UG/ML
50 INJECTION, SOLUTION INTRAMUSCULAR; INTRAVENOUS EVERY 5 MIN PRN
Status: DISCONTINUED | OUTPATIENT
Start: 2023-11-08 | End: 2023-11-08 | Stop reason: HOSPADM

## 2023-11-08 RX ORDER — ONDANSETRON 4 MG/1
4 TABLET, ORALLY DISINTEGRATING ORAL EVERY 6 HOURS PRN
Status: DISCONTINUED | OUTPATIENT
Start: 2023-11-08 | End: 2023-11-08 | Stop reason: HOSPADM

## 2023-11-08 RX ORDER — ONDANSETRON 2 MG/ML
INJECTION INTRAMUSCULAR; INTRAVENOUS PRN
Status: DISCONTINUED | OUTPATIENT
Start: 2023-11-08 | End: 2023-11-08

## 2023-11-08 RX ORDER — FENTANYL CITRATE 50 UG/ML
25 INJECTION, SOLUTION INTRAMUSCULAR; INTRAVENOUS EVERY 5 MIN PRN
Status: DISCONTINUED | OUTPATIENT
Start: 2023-11-08 | End: 2023-11-08 | Stop reason: HOSPADM

## 2023-11-08 RX ORDER — LIDOCAINE HYDROCHLORIDE 20 MG/ML
INJECTION, SOLUTION INFILTRATION; PERINEURAL PRN
Status: DISCONTINUED | OUTPATIENT
Start: 2023-11-08 | End: 2023-11-08

## 2023-11-08 RX ORDER — FLUMAZENIL 0.1 MG/ML
0.2 INJECTION, SOLUTION INTRAVENOUS
Status: DISCONTINUED | OUTPATIENT
Start: 2023-11-08 | End: 2023-11-08 | Stop reason: HOSPADM

## 2023-11-08 RX ORDER — OXYCODONE HYDROCHLORIDE 5 MG/1
5 TABLET ORAL
Status: DISCONTINUED | OUTPATIENT
Start: 2023-11-08 | End: 2023-11-08 | Stop reason: HOSPADM

## 2023-11-08 RX ORDER — HYDROMORPHONE HCL IN WATER/PF 6 MG/30 ML
0.4 PATIENT CONTROLLED ANALGESIA SYRINGE INTRAVENOUS EVERY 5 MIN PRN
Status: DISCONTINUED | OUTPATIENT
Start: 2023-11-08 | End: 2023-11-08 | Stop reason: HOSPADM

## 2023-11-08 RX ORDER — NICOTINE POLACRILEX 4 MG
15-30 LOZENGE BUCCAL
Status: DISCONTINUED | OUTPATIENT
Start: 2023-11-08 | End: 2023-11-08 | Stop reason: HOSPADM

## 2023-11-08 RX ORDER — SODIUM CHLORIDE, SODIUM LACTATE, POTASSIUM CHLORIDE, CALCIUM CHLORIDE 600; 310; 30; 20 MG/100ML; MG/100ML; MG/100ML; MG/100ML
INJECTION, SOLUTION INTRAVENOUS CONTINUOUS PRN
Status: DISCONTINUED | OUTPATIENT
Start: 2023-11-08 | End: 2023-11-08

## 2023-11-08 RX ORDER — LEVOFLOXACIN 5 MG/ML
INJECTION, SOLUTION INTRAVENOUS PRN
Status: DISCONTINUED | OUTPATIENT
Start: 2023-11-08 | End: 2023-11-08

## 2023-11-08 RX ORDER — FENTANYL CITRATE 50 UG/ML
INJECTION, SOLUTION INTRAMUSCULAR; INTRAVENOUS PRN
Status: DISCONTINUED | OUTPATIENT
Start: 2023-11-08 | End: 2023-11-08

## 2023-11-08 RX ADMIN — SUGAMMADEX 200 MG: 100 INJECTION, SOLUTION INTRAVENOUS at 10:22

## 2023-11-08 RX ADMIN — SODIUM CHLORIDE 6.9 UNITS: 9 INJECTION, SOLUTION INTRAVENOUS at 09:19

## 2023-11-08 RX ADMIN — SODIUM CHLORIDE, POTASSIUM CHLORIDE, SODIUM LACTATE AND CALCIUM CHLORIDE: 600; 310; 30; 20 INJECTION, SOLUTION INTRAVENOUS at 09:30

## 2023-11-08 RX ADMIN — PROPOFOL 150 MG: 10 INJECTION, EMULSION INTRAVENOUS at 09:33

## 2023-11-08 RX ADMIN — FENTANYL CITRATE 100 MCG: 50 INJECTION INTRAMUSCULAR; INTRAVENOUS at 09:33

## 2023-11-08 RX ADMIN — ONDANSETRON 4 MG: 2 INJECTION INTRAMUSCULAR; INTRAVENOUS at 09:33

## 2023-11-08 RX ADMIN — PROPOFOL 50 MG: 10 INJECTION, EMULSION INTRAVENOUS at 09:39

## 2023-11-08 RX ADMIN — Medication 50 MG: at 09:33

## 2023-11-08 RX ADMIN — LEVOFLOXACIN 500 MG: 5 INJECTION, SOLUTION INTRAVENOUS at 09:40

## 2023-11-08 RX ADMIN — LIDOCAINE HYDROCHLORIDE 80 MG: 20 INJECTION, SOLUTION INFILTRATION; PERINEURAL at 09:33

## 2023-11-08 ASSESSMENT — ACTIVITIES OF DAILY LIVING (ADL)
ADLS_ACUITY_SCORE: 35
ADLS_ACUITY_SCORE: 35

## 2023-11-08 NOTE — ANESTHESIA CARE TRANSFER NOTE
Patient: Dillon Salter    Procedure: Procedure(s):  ENDOSCOPIC RETROGRADE CHOLANGIOPANCREATOGRAPHY, WITH REPLACEMENT OF TUBE OR STENT, sludge removal, and biliary duct dilation.       Diagnosis: Biliary stricture (H28) [K83.1]  Diagnosis Additional Information: No value filed.    Anesthesia Type:   General     Note:    Oropharynx: spontaneously breathing  Level of Consciousness: awake  Oxygen Supplementation: face mask    Independent Airway: airway patency satisfactory and stable  Dentition: dentition unchanged  Vital Signs Stable: post-procedure vital signs reviewed and stable  Report to RN Given: handoff report given  Patient transferred to: PACU    Handoff Report: Identifed the Patient, Identified the Reponsible Provider, Reviewed the pertinent medical history, Discussed the surgical course, Reviewed Intra-OP anesthesia mangement and issues during anesthesia, Set expectations for post-procedure period and Allowed opportunity for questions and acknowledgement of understanding      Vitals:  Vitals Value Taken Time   /106 11/08/23 1027   Temp     Pulse 81 11/08/23 1027   Resp 19 11/08/23 1028   SpO2 100 % 11/08/23 1028   Vitals shown include unfiled device data.    Electronically Signed By: VAMSHI Lopez CRNA  November 8, 2023  10:30 AM

## 2023-11-08 NOTE — OR NURSING
- Dr. Townsend notified at the bedside. See MAR for insulin administration. OR team aware during bedside handoff to recheck BG.

## 2023-11-08 NOTE — ANESTHESIA POSTPROCEDURE EVALUATION
Patient: Dillon Salter    Procedure: Procedure(s):  ENDOSCOPIC RETROGRADE CHOLANGIOPANCREATOGRAPHY, WITH REPLACEMENT OF TUBE OR STENT, sludge removal, and biliary duct dilation.       Anesthesia Type:  General    Note:  Disposition: Outpatient   Postop Pain Control: Uneventful            Sign Out: Well controlled pain   PONV: No   Neuro/Psych: Uneventful            Sign Out: Acceptable/Baseline neuro status   Airway/Respiratory: Uneventful            Sign Out: Acceptable/Baseline resp. status   CV/Hemodynamics: Uneventful            Sign Out: Acceptable CV status; No obvious hypovolemia; No obvious fluid overload   Other NRE: NONE   DID A NON-ROUTINE EVENT OCCUR? No           Last vitals:  Vitals Value Taken Time   /91 11/08/23 1030   Temp     Pulse 83 11/08/23 1038   Resp 18 11/08/23 1038   SpO2 98 % 11/08/23 1038   Vitals shown include unfiled device data.    Electronically Signed By: Benito Townsend MD  November 8, 2023  10:40 AM

## 2023-11-08 NOTE — OR NURSING
MD Wright paged if he would like to see the patient before discharge. Dillon Patient moved to Phase II. RN notified.

## 2023-11-08 NOTE — PROGRESS NOTES
Situation: Patient chart reviewed by care coordinator.    Patient no longer following with Dr. Riojas.  Removing self from RNCC.    Antoinette Oviedo RN

## 2023-11-08 NOTE — ANESTHESIA PROCEDURE NOTES
Airway       Patient location during procedure: OR       Procedure Start/Stop Times: 11/8/2023 9:39 AM  Staff -        CRNA: Ina Stevens APRN CRNA       Performed By: CRNA  Consent for Airway        Urgency: elective  Indications and Patient Condition       Indications for airway management: jalen-procedural       Induction type:intravenous       Mask difficulty assessment: 1 - vent by mask    Final Airway Details       Final airway type: endotracheal airway       Successful airway: ETT - single  Endotracheal Airway Details        ETT size (mm): 7.5       Successful intubation technique: direct laryngoscopy       DL Blade Type: MAC 4       Grade View of Cords: 1       Adjucts: stylet       Position: Right       Measured from: lips       Secured at (cm): 23       Bite block used: None    Post intubation assessment        Placement verified by: capnometry, equal breath sounds and chest rise        Number of attempts at approach: 1       Secured with: pink tape       Ease of procedure: easy       Dentition: Intact and Unchanged    Medication(s) Administered   Medication Administration Time: 11/8/2023 9:39 AM

## 2023-11-10 ENCOUNTER — TELEPHONE (OUTPATIENT)
Dept: TRANSPLANT | Facility: CLINIC | Age: 28
End: 2023-11-10
Payer: COMMERCIAL

## 2023-11-10 LAB
LABORATORY REPORT: NORMAL
PLPETH BLD-MCNC: <10 NG/ML
POPETH BLD-MCNC: <10 NG/ML

## 2023-11-10 NOTE — TELEPHONE ENCOUNTER
Dillon had ERCP on 11/8, hasn't had labs since 9/14 and missed 10/10 appt with Dr. Wood. Can also stop bactrim and aspirin.     [email or web address] message was sent to remind them to get monthly labs, has not been read yet.     Call to Leticia. Left message to have Dillon get labs, that he missed an appointment in October, and to call back to stop a couple meds.

## 2023-11-13 NOTE — TELEPHONE ENCOUNTER
Spoke to Leticia. Asked why Dillon didn't have labs for 2 months and she stated that she didn't know when he needed labs again. She will have Dillon get labs on Wednesday. Dillon did have labs drawn on 11/8  before his ERCP, but results not routed to coordinator, and no tacrolimus level drawn.     Informed Leticia that bactrim and aspirin can be stopped. She will make that change.

## 2023-11-15 ENCOUNTER — PATIENT OUTREACH (OUTPATIENT)
Dept: GASTROENTEROLOGY | Facility: CLINIC | Age: 28
End: 2023-11-15
Payer: COMMERCIAL

## 2023-11-15 ENCOUNTER — TELEPHONE (OUTPATIENT)
Dept: TRANSPLANT | Facility: CLINIC | Age: 28
End: 2023-11-15
Payer: COMMERCIAL

## 2023-11-15 DIAGNOSIS — Z46.89 ENCOUNTER FOR REMOVAL OF BILIARY STENT: Primary | ICD-10-CM

## 2023-11-15 NOTE — PROGRESS NOTES
Follow up: Post ERCP on 11/8/23 with Dr. Wright.      Post procedure recommendations:   - Observe patient in same day observation unit for ongoing care.   - Confirm spontaneous stent passage by performing a flat and upright abdominal x-ray in 4 weeks.   - If stent is present,will need an upper endoscopy under moderate conscious sedation in Unit J for biliary stent removal.   - The findings and recommendations were discussed with the patient.     Patient states: Unable to reach. LVM with clinic contact information and sent [employer] message.    Orders placed:   -Abdomen XR (due 12/6/23); added to panc bili stent log.    Marilu Levine RN Care Coordinator

## 2023-11-15 NOTE — TELEPHONE ENCOUNTER
Call placed to patient for Annual [employer] Patient Status Update -      Questions asked are standardized questions that the [employer] requires all transplant centers to report back to the [employer].  [employer] requests this information for all transplant recipients (kidney, liver, heart, lung, pancreas, etc).  [employer] utilizes this information to best understand recipient outcomes and equitable distribution of organs.      Called x1, LVM to return call.

## 2023-11-16 ENCOUNTER — LAB (OUTPATIENT)
Dept: LAB | Facility: CLINIC | Age: 28
End: 2023-11-16
Payer: COMMERCIAL

## 2023-11-16 DIAGNOSIS — K70.31 ALCOHOLIC CIRRHOSIS OF LIVER WITH ASCITES (H): Chronic | ICD-10-CM

## 2023-11-16 DIAGNOSIS — Z94.4 LIVER REPLACED BY TRANSPLANT (H): ICD-10-CM

## 2023-11-16 LAB
ALBUMIN SERPL BCG-MCNC: 3.7 G/DL (ref 3.5–5.2)
ALP SERPL-CCNC: 264 U/L (ref 40–150)
ALT SERPL W P-5'-P-CCNC: 17 U/L (ref 0–70)
ANION GAP SERPL CALCULATED.3IONS-SCNC: 7 MMOL/L (ref 7–15)
AST SERPL W P-5'-P-CCNC: 22 U/L (ref 0–45)
BASOPHILS # BLD AUTO: 0.1 10E3/UL (ref 0–0.2)
BASOPHILS NFR BLD AUTO: 1 %
BILIRUB DIRECT SERPL-MCNC: 0.21 MG/DL (ref 0–0.3)
BILIRUB SERPL-MCNC: 0.7 MG/DL
BUN SERPL-MCNC: 23.5 MG/DL (ref 6–20)
CALCIUM SERPL-MCNC: 10 MG/DL (ref 8.6–10)
CHLORIDE SERPL-SCNC: 95 MMOL/L (ref 98–107)
CREAT SERPL-MCNC: 1.18 MG/DL (ref 0.67–1.17)
DEPRECATED HCO3 PLAS-SCNC: 33 MMOL/L (ref 22–29)
EGFRCR SERPLBLD CKD-EPI 2021: 85 ML/MIN/1.73M2
EOSINOPHIL # BLD AUTO: 0.1 10E3/UL (ref 0–0.7)
EOSINOPHIL NFR BLD AUTO: 2 %
ERYTHROCYTE [DISTWIDTH] IN BLOOD BY AUTOMATED COUNT: 14.5 % (ref 10–15)
GLUCOSE SERPL-MCNC: 130 MG/DL (ref 70–99)
HCT VFR BLD AUTO: 29.2 % (ref 40–53)
HGB BLD-MCNC: 9.8 G/DL (ref 13.3–17.7)
IMM GRANULOCYTES # BLD: 0.1 10E3/UL
IMM GRANULOCYTES NFR BLD: 2 %
LYMPHOCYTES # BLD AUTO: 1.8 10E3/UL (ref 0.8–5.3)
LYMPHOCYTES NFR BLD AUTO: 23 %
MAGNESIUM SERPL-MCNC: 1.8 MG/DL (ref 1.7–2.3)
MCH RBC QN AUTO: 29.3 PG (ref 26.5–33)
MCHC RBC AUTO-ENTMCNC: 33.6 G/DL (ref 31.5–36.5)
MCV RBC AUTO: 87 FL (ref 78–100)
MONOCYTES # BLD AUTO: 0.4 10E3/UL (ref 0–1.3)
MONOCYTES NFR BLD AUTO: 5 %
NEUTROPHILS # BLD AUTO: 5.2 10E3/UL (ref 1.6–8.3)
NEUTROPHILS NFR BLD AUTO: 67 %
NRBC # BLD AUTO: 0 10E3/UL
NRBC BLD AUTO-RTO: 0 /100
PHOSPHATE SERPL-MCNC: 3.2 MG/DL (ref 2.5–4.5)
PLATELET # BLD AUTO: 178 10E3/UL (ref 150–450)
POTASSIUM SERPL-SCNC: 4.5 MMOL/L (ref 3.4–5.3)
PROT SERPL-MCNC: 6.8 G/DL (ref 6.4–8.3)
RBC # BLD AUTO: 3.34 10E6/UL (ref 4.4–5.9)
SODIUM SERPL-SCNC: 135 MMOL/L (ref 135–145)
TACROLIMUS BLD-MCNC: 10 UG/L (ref 5–15)
TME LAST DOSE: NORMAL H
TME LAST DOSE: NORMAL H
WBC # BLD AUTO: 7.6 10E3/UL (ref 4–11)
WBC # BLD AUTO: 7.6 10E3/UL (ref 4–11)

## 2023-11-16 PROCEDURE — 80197 ASSAY OF TACROLIMUS: CPT

## 2023-11-16 PROCEDURE — 84450 TRANSFERASE (AST) (SGOT): CPT

## 2023-11-16 PROCEDURE — 83735 ASSAY OF MAGNESIUM: CPT

## 2023-11-16 PROCEDURE — 80321 ALCOHOLS BIOMARKERS 1OR 2: CPT

## 2023-11-16 PROCEDURE — 84155 ASSAY OF PROTEIN SERUM: CPT

## 2023-11-16 PROCEDURE — 85014 HEMATOCRIT: CPT

## 2023-11-16 PROCEDURE — 36415 COLL VENOUS BLD VENIPUNCTURE: CPT

## 2023-11-16 PROCEDURE — 85048 AUTOMATED LEUKOCYTE COUNT: CPT

## 2023-11-16 PROCEDURE — 84100 ASSAY OF PHOSPHORUS: CPT

## 2023-11-16 PROCEDURE — 82310 ASSAY OF CALCIUM: CPT

## 2023-11-16 PROCEDURE — 82248 BILIRUBIN DIRECT: CPT

## 2023-11-17 ENCOUNTER — TELEPHONE (OUTPATIENT)
Dept: TRANSPLANT | Facility: CLINIC | Age: 28
End: 2023-11-17
Payer: COMMERCIAL

## 2023-11-17 DIAGNOSIS — Z94.4 LIVER REPLACED BY TRANSPLANT (H): Primary | ICD-10-CM

## 2023-11-17 RX ORDER — TACROLIMUS 1 MG/1
1 CAPSULE ORAL EVERY 12 HOURS
Qty: 180 CAPSULE | Refills: 3 | Status: SHIPPED | OUTPATIENT
Start: 2023-11-17

## 2023-11-17 NOTE — TELEPHONE ENCOUNTER
ISSUE:   Tacrolimus IR level 10 on 11/16, goal 5-7, dose 2 mg BID.    PLAN:   Please call patient and confirm this was an accurate 12-hour trough. Verify Tacrolimus IR dose 2 mg BID. Confirm no new medications or illness. Confirm no missed doses. If accurate trough and accurate dose, decrease Tacrolimus IR dose to 1 mg BID and repeat labs in 1 month.    Remind Leticia that Dillon needs monthly labs until at least 1 year post transplant. He also needs to see Dr. Wood at some point in the near future because he missed his appt on 10/10 (order placed).     Evy Pride, RN      OUTCOME:   Spoke with Leticia, they confirm accurate trough level and current dose 2 mg BID. Patient confirmed dose change to 1 mg BID and to repeat labs in 1 months. Orders sent to preferred pharmacy for dose change and lab for repeat labs. Leticia voiced understanding of plan.     Apolonia Peters LPN

## 2023-11-20 LAB
LABORATORY REPORT: NORMAL
PETH INTERPRETATION: NORMAL
PLPETH BLD-MCNC: <10 NG/ML
POPETH BLD-MCNC: <10 NG/ML

## 2023-11-22 ENCOUNTER — TELEPHONE (OUTPATIENT)
Dept: TRANSPLANT | Facility: CLINIC | Age: 28
End: 2023-11-22
Payer: COMMERCIAL

## 2023-11-29 DIAGNOSIS — Z94.4 LIVER REPLACED BY TRANSPLANT (H): Primary | ICD-10-CM

## 2023-12-08 DIAGNOSIS — Z94.4 LIVER REPLACED BY TRANSPLANT (H): ICD-10-CM

## 2023-12-08 RX ORDER — PREDNISONE 5 MG/1
5 TABLET ORAL DAILY
Qty: 90 TABLET | Refills: 3 | Status: SHIPPED | OUTPATIENT
Start: 2023-12-08 | End: 2024-01-05

## 2023-12-08 NOTE — PROGRESS NOTES
Got a call from Johnny at  specialty pharmacy. The pharmacy got a call from Leticia that Dillon is out of prednisone. Prednisone reordered.

## 2023-12-15 ENCOUNTER — HOSPITAL ENCOUNTER (OUTPATIENT)
Dept: RADIOLOGY | Facility: CLINIC | Age: 28
Discharge: HOME | End: 2023-12-15
Payer: COMMERCIAL

## 2023-12-15 ENCOUNTER — LAB (OUTPATIENT)
Dept: LAB | Facility: CLINIC | Age: 28
End: 2023-12-15
Payer: COMMERCIAL

## 2023-12-15 DIAGNOSIS — Z94.4 LIVER REPLACED BY TRANSPLANT (H): ICD-10-CM

## 2023-12-15 DIAGNOSIS — Z46.89 ENCOUNTER FOR REMOVAL OF BILIARY STENT: ICD-10-CM

## 2023-12-15 LAB
ALBUMIN SERPL BCG-MCNC: 4.1 G/DL (ref 3.5–5.2)
ALP SERPL-CCNC: 165 U/L (ref 40–150)
ALT SERPL W P-5'-P-CCNC: 24 U/L (ref 0–70)
ANION GAP SERPL CALCULATED.3IONS-SCNC: 7 MMOL/L (ref 7–15)
AST SERPL W P-5'-P-CCNC: 21 U/L (ref 0–45)
BILIRUB DIRECT SERPL-MCNC: <0.2 MG/DL (ref 0–0.3)
BILIRUB SERPL-MCNC: 0.5 MG/DL
BUN SERPL-MCNC: 27.2 MG/DL (ref 6–20)
CALCIUM SERPL-MCNC: 10.1 MG/DL (ref 8.6–10)
CHLORIDE SERPL-SCNC: 102 MMOL/L (ref 98–107)
CREAT SERPL-MCNC: 0.94 MG/DL (ref 0.67–1.17)
DEPRECATED HCO3 PLAS-SCNC: 33 MMOL/L (ref 22–29)
EGFRCR SERPLBLD CKD-EPI 2021: >90 ML/MIN/1.73M2
ERYTHROCYTE [DISTWIDTH] IN BLOOD BY AUTOMATED COUNT: 13.8 % (ref 10–15)
GLUCOSE SERPL-MCNC: 280 MG/DL (ref 70–99)
HCT VFR BLD AUTO: 35.6 % (ref 40–53)
HGB BLD-MCNC: 11.4 G/DL (ref 13.3–17.7)
MAGNESIUM SERPL-MCNC: 1.8 MG/DL (ref 1.7–2.3)
MCH RBC QN AUTO: 28.8 PG (ref 26.5–33)
MCHC RBC AUTO-ENTMCNC: 32 G/DL (ref 31.5–36.5)
MCV RBC AUTO: 90 FL (ref 78–100)
PHOSPHATE SERPL-MCNC: 3.2 MG/DL (ref 2.5–4.5)
PLATELET # BLD AUTO: 168 10E3/UL (ref 150–450)
POTASSIUM SERPL-SCNC: 4.3 MMOL/L (ref 3.4–5.3)
PROT SERPL-MCNC: 7.2 G/DL (ref 6.4–8.3)
RBC # BLD AUTO: 3.96 10E6/UL (ref 4.4–5.9)
SODIUM SERPL-SCNC: 142 MMOL/L (ref 135–145)
TACROLIMUS BLD-MCNC: 2.5 UG/L (ref 5–15)
TME LAST DOSE: ABNORMAL H
TME LAST DOSE: ABNORMAL H
WBC # BLD AUTO: 5.7 10E3/UL (ref 4–11)

## 2023-12-15 PROCEDURE — 83735 ASSAY OF MAGNESIUM: CPT

## 2023-12-15 PROCEDURE — 36415 COLL VENOUS BLD VENIPUNCTURE: CPT

## 2023-12-15 PROCEDURE — 80321 ALCOHOLS BIOMARKERS 1OR 2: CPT

## 2023-12-15 PROCEDURE — 80197 ASSAY OF TACROLIMUS: CPT

## 2023-12-15 PROCEDURE — 84100 ASSAY OF PHOSPHORUS: CPT

## 2023-12-15 PROCEDURE — 85027 COMPLETE CBC AUTOMATED: CPT

## 2023-12-15 PROCEDURE — 74019 RADEX ABDOMEN 2 VIEWS: CPT

## 2023-12-15 PROCEDURE — 82248 BILIRUBIN DIRECT: CPT

## 2023-12-18 ENCOUNTER — TELEPHONE (OUTPATIENT)
Dept: TRANSPLANT | Facility: CLINIC | Age: 28
End: 2023-12-18
Payer: COMMERCIAL

## 2023-12-18 DIAGNOSIS — Z94.4 LIVER REPLACED BY TRANSPLANT (H): Primary | ICD-10-CM

## 2023-12-18 NOTE — TELEPHONE ENCOUNTER
ISSUE:   Tacrolimus IR level 2.5 on 12/15, goal 5-7, dose 1 mg BID.    PLAN:   Call Parentand confirm this was an accurate 12-hour trough.   Verify Tacrolimus IR dose 1 mg BID.   Confirm no new medications or illness.   Confirm no missed doses.   If accurate trough and accurate dose, increase Tacrolimus IR dose to 2 mg BID     Is this more than a 50% increase or decrease in current IS dose: Yes  If YES, justification: Tacrolimus level quite a bit lower than goal    Repeat labs in 1 weeks.  *If > 50% change in immunosuppression dose, repeat labs in 1 week.     Evy Pride, RN      OUTCOME:     Leticia states that this blood draw was a 24 hour collection. Pt took his last dose at 3pm the day before and then had his tacro drawn 3 pm the next day. Please advise.     Instructed Leticia to have pt repeat accurate tacrolimus trough this week.     Apolonia Peters LPN

## 2023-12-23 ENCOUNTER — APPOINTMENT (OUTPATIENT)
Dept: LAB | Facility: CLINIC | Age: 28
End: 2023-12-23
Payer: COMMERCIAL

## 2023-12-23 ENCOUNTER — HEALTH MAINTENANCE LETTER (OUTPATIENT)
Age: 28
End: 2023-12-23

## 2023-12-23 PROCEDURE — 80197 ASSAY OF TACROLIMUS: CPT | Performed by: SURGERY

## 2023-12-23 PROCEDURE — 36415 COLL VENOUS BLD VENIPUNCTURE: CPT

## 2023-12-24 LAB
TACROLIMUS BLD-MCNC: 4.1 UG/L (ref 5–15)
TME LAST DOSE: ABNORMAL H
TME LAST DOSE: ABNORMAL H

## 2024-01-05 ENCOUNTER — TELEPHONE (OUTPATIENT)
Dept: TRANSPLANT | Facility: CLINIC | Age: 29
End: 2024-01-05
Payer: COMMERCIAL

## 2024-01-05 DIAGNOSIS — Z94.4 LIVER REPLACED BY TRANSPLANT (H): ICD-10-CM

## 2024-01-05 RX ORDER — PREDNISONE 5 MG/1
2.5 TABLET ORAL DAILY
Qty: 45 TABLET | Refills: 3 | Status: SHIPPED | OUTPATIENT
Start: 2024-01-05 | End: 2024-01-23

## 2024-01-05 RX ORDER — TACROLIMUS 0.5 MG/1
0.5 CAPSULE ORAL 2 TIMES DAILY
Qty: 180 CAPSULE | Refills: 3 | Status: SHIPPED | OUTPATIENT
Start: 2024-01-05

## 2024-01-05 NOTE — TELEPHONE ENCOUNTER
Leticia called regarding Dillon's last tacrolimus level from 12/23 that was 4.1. It wasn't addressed and is a little lower than goal.     Informed Leticia to increase Dillon's tacrolimus dose to 1.5 mg BID, she will make that change. She also asked about Dillon's prednisone wean. Dr. Wood talked about a prednisone wean over the summer, but it was never started. Informed her to decrease prednisone from 5 mg daily to 2.5 mg daily. Repeat labs next week as it is about time for his monthly labs anyway. Leticia verbalized understanding.

## 2024-01-16 ENCOUNTER — TELEPHONE (OUTPATIENT)
Dept: TRANSPLANT | Facility: CLINIC | Age: 29
End: 2024-01-16
Payer: COMMERCIAL

## 2024-01-16 NOTE — TELEPHONE ENCOUNTER
Leticia called. Dillon needs a lot of dental work and a lot of teeth pulled. Dental office needs a form filled out and signed by transplant team. Leticia will fax to coordinator tomorrow.

## 2024-01-19 ENCOUNTER — TELEPHONE (OUTPATIENT)
Dept: TRANSPLANT | Facility: CLINIC | Age: 29
End: 2024-01-19
Payer: COMMERCIAL

## 2024-01-19 NOTE — TELEPHONE ENCOUNTER
Dillon called to tell coordinator he was not feeling well today and that he would get his labs done on Monday instead of today.

## 2024-01-22 ENCOUNTER — LAB (OUTPATIENT)
Dept: LAB | Facility: CLINIC | Age: 29
End: 2024-01-22
Payer: COMMERCIAL

## 2024-01-22 DIAGNOSIS — Z94.4 LIVER REPLACED BY TRANSPLANT (H): ICD-10-CM

## 2024-01-22 LAB
ALBUMIN SERPL BCG-MCNC: 4.5 G/DL (ref 3.5–5.2)
ALP SERPL-CCNC: 257 U/L (ref 40–150)
ALT SERPL W P-5'-P-CCNC: 25 U/L (ref 0–70)
ANION GAP SERPL CALCULATED.3IONS-SCNC: 8 MMOL/L (ref 7–15)
AST SERPL W P-5'-P-CCNC: 24 U/L (ref 0–45)
BILIRUB DIRECT SERPL-MCNC: <0.2 MG/DL (ref 0–0.3)
BILIRUB SERPL-MCNC: 0.6 MG/DL
BUN SERPL-MCNC: 32.6 MG/DL (ref 6–20)
CALCIUM SERPL-MCNC: 10.2 MG/DL (ref 8.6–10)
CHLORIDE SERPL-SCNC: 100 MMOL/L (ref 98–107)
CREAT SERPL-MCNC: 1.45 MG/DL (ref 0.67–1.17)
DEPRECATED HCO3 PLAS-SCNC: 33 MMOL/L (ref 22–29)
EGFRCR SERPLBLD CKD-EPI 2021: 66 ML/MIN/1.73M2
ERYTHROCYTE [DISTWIDTH] IN BLOOD BY AUTOMATED COUNT: 14.1 % (ref 10–15)
GLUCOSE SERPL-MCNC: 149 MG/DL (ref 70–99)
HCT VFR BLD AUTO: 37.7 % (ref 40–53)
HGB BLD-MCNC: 12.3 G/DL (ref 13.3–17.7)
MAGNESIUM SERPL-MCNC: 1.9 MG/DL (ref 1.7–2.3)
MCH RBC QN AUTO: 28.3 PG (ref 26.5–33)
MCHC RBC AUTO-ENTMCNC: 32.6 G/DL (ref 31.5–36.5)
MCV RBC AUTO: 87 FL (ref 78–100)
PHOSPHATE SERPL-MCNC: 4.2 MG/DL (ref 2.5–4.5)
PLATELET # BLD AUTO: 190 10E3/UL (ref 150–450)
POTASSIUM SERPL-SCNC: 4.4 MMOL/L (ref 3.4–5.3)
PROT SERPL-MCNC: 8 G/DL (ref 6.4–8.3)
RBC # BLD AUTO: 4.35 10E6/UL (ref 4.4–5.9)
SODIUM SERPL-SCNC: 141 MMOL/L (ref 135–145)
WBC # BLD AUTO: 7.1 10E3/UL (ref 4–11)

## 2024-01-22 PROCEDURE — 83735 ASSAY OF MAGNESIUM: CPT

## 2024-01-22 PROCEDURE — 80053 COMPREHEN METABOLIC PANEL: CPT

## 2024-01-22 PROCEDURE — 36415 COLL VENOUS BLD VENIPUNCTURE: CPT

## 2024-01-22 PROCEDURE — 80197 ASSAY OF TACROLIMUS: CPT

## 2024-01-22 PROCEDURE — 85027 COMPLETE CBC AUTOMATED: CPT

## 2024-01-22 PROCEDURE — 84100 ASSAY OF PHOSPHORUS: CPT

## 2024-01-22 PROCEDURE — 80321 ALCOHOLS BIOMARKERS 1OR 2: CPT

## 2024-01-23 ENCOUNTER — TELEPHONE (OUTPATIENT)
Dept: TRANSPLANT | Facility: CLINIC | Age: 29
End: 2024-01-23
Payer: COMMERCIAL

## 2024-01-23 DIAGNOSIS — Z94.4 LIVER REPLACED BY TRANSPLANT (H): Primary | ICD-10-CM

## 2024-01-23 DIAGNOSIS — E83.42 HYPOMAGNESEMIA: ICD-10-CM

## 2024-01-23 LAB
TACROLIMUS BLD-MCNC: 5.2 UG/L (ref 5–15)
TME LAST DOSE: NORMAL H
TME LAST DOSE: NORMAL H

## 2024-01-23 RX ORDER — MAGNESIUM GLYCINATE 100 MG
200 CAPSULE ORAL DAILY
Qty: 180 CAPSULE | Refills: 3 | Status: SHIPPED | OUTPATIENT
Start: 2024-01-23

## 2024-01-23 NOTE — TELEPHONE ENCOUNTER
Call to Leticia to decrease magnesium to 200 mg once per day and stop protonix and prednisone, repeat BMP and hepatic panel in 2 weeks. Leticia will make med changes and get Dillon in for labs on 2/5. Med list updated

## 2024-02-08 ENCOUNTER — DOCUMENTATION ONLY (OUTPATIENT)
Dept: TRANSPLANT | Facility: CLINIC | Age: 29
End: 2024-02-08
Payer: COMMERCIAL

## 2024-02-08 ASSESSMENT — ENCOUNTER SYMPTOMS: NEW SYMPTOMS OF CORONARY ARTERY DISEASE: 0

## 2024-03-20 ENCOUNTER — TELEPHONE (OUTPATIENT)
Dept: PHARMACY | Facility: CLINIC | Age: 29
End: 2024-03-20
Payer: COMMERCIAL

## 2024-03-20 NOTE — TELEPHONE ENCOUNTER
Clinical Pharmacy Consult:                                                      Transplant Specific: 12 month post transplant medication review  Date of Transplant: 2/28/23  Type of Transplant: liver  First Transplant: yes  History of rejection: no    Immunosuppression Regimen   TAC 1.5mg qAM & 1.5mg qPM  Patient specific goal: 5-7  Most recent level: 5.2, date 01/05/2024  Immunosuppressant Levels:  therapeutic  Pt adherent to lab draws: yes  Scr:   Lab Results   Component Value Date    CR 1.09 06/01/2023     Side effects: not known    Prophylactic Medications  Antibacterial:  Bactrim ss 3 times a week  Scheduled Discontinue Date: Therapy Complete    Antifungal: Not needed thus far  Scheduled Discontinue Date: N/A    Antiviral: CrCl 40 to 59 mL/minute: Valcyte 450 mg once daily   Scheduled Discontinue Date: Therapy Complete    Acid Reducer: Protonix (pantoprazole)  Scheduled Reviewed Date:       Thrombosis Prevention: Aspirin 81 mg PO daily  Scheduled Discontinue Date:  managed by clinic    Blood Pressure Management  Frequency of home Blood Pressure checks: Couple times a week    Most recent home BP: 110/81 at last OV  Patient Blood pressure goal:  140/90  Patient blood pressure at goal: yes       Hospitalizations/ER visits since last assessment: 0        Med rec/DUR performed: no  Med Rec Discrepancies: no      Unable to reach pt. Refill histories show adherence. Tac lab and BP are at goal.    Follow up in 1 year    Hussein Urrutia, PharmD  [facility]  [phone]

## 2024-03-21 ENCOUNTER — LAB (OUTPATIENT)
Dept: LAB | Facility: CLINIC | Age: 29
End: 2024-03-21
Payer: COMMERCIAL

## 2024-03-21 DIAGNOSIS — Z94.4 LIVER REPLACED BY TRANSPLANT (H): ICD-10-CM

## 2024-03-21 LAB
ALBUMIN SERPL BCG-MCNC: 4.1 G/DL (ref 3.5–5.2)
ALP SERPL-CCNC: 262 U/L (ref 40–150)
ALT SERPL W P-5'-P-CCNC: 24 U/L (ref 0–70)
ANION GAP SERPL CALCULATED.3IONS-SCNC: 7 MMOL/L (ref 7–15)
AST SERPL W P-5'-P-CCNC: 32 U/L (ref 0–45)
BILIRUB DIRECT SERPL-MCNC: <0.2 MG/DL (ref 0–0.3)
BILIRUB SERPL-MCNC: 0.4 MG/DL
BUN SERPL-MCNC: 24.5 MG/DL (ref 6–20)
CALCIUM SERPL-MCNC: 10 MG/DL (ref 8.6–10)
CHLORIDE SERPL-SCNC: 101 MMOL/L (ref 98–107)
CREAT SERPL-MCNC: 1.22 MG/DL (ref 0.67–1.17)
DEPRECATED HCO3 PLAS-SCNC: 31 MMOL/L (ref 22–29)
EGFRCR SERPLBLD CKD-EPI 2021: 82 ML/MIN/1.73M2
ERYTHROCYTE [DISTWIDTH] IN BLOOD BY AUTOMATED COUNT: 13.5 % (ref 10–15)
GLUCOSE SERPL-MCNC: 198 MG/DL (ref 70–99)
HCT VFR BLD AUTO: 33.7 % (ref 40–53)
HGB BLD-MCNC: 11.3 G/DL (ref 13.3–17.7)
MAGNESIUM SERPL-MCNC: 1.9 MG/DL (ref 1.7–2.3)
MCH RBC QN AUTO: 29.1 PG (ref 26.5–33)
MCHC RBC AUTO-ENTMCNC: 33.5 G/DL (ref 31.5–36.5)
MCV RBC AUTO: 87 FL (ref 78–100)
PHOSPHATE SERPL-MCNC: 4.4 MG/DL (ref 2.5–4.5)
PLATELET # BLD AUTO: 223 10E3/UL (ref 150–450)
POTASSIUM SERPL-SCNC: 4.6 MMOL/L (ref 3.4–5.3)
PROT SERPL-MCNC: 7.3 G/DL (ref 6.4–8.3)
RBC # BLD AUTO: 3.88 10E6/UL (ref 4.4–5.9)
SODIUM SERPL-SCNC: 139 MMOL/L (ref 135–145)
WBC # BLD AUTO: 7.4 10E3/UL (ref 4–11)

## 2024-03-21 PROCEDURE — 83735 ASSAY OF MAGNESIUM: CPT

## 2024-03-21 PROCEDURE — 36415 COLL VENOUS BLD VENIPUNCTURE: CPT

## 2024-03-21 PROCEDURE — 80076 HEPATIC FUNCTION PANEL: CPT

## 2024-03-21 PROCEDURE — 80321 ALCOHOLS BIOMARKERS 1OR 2: CPT

## 2024-03-21 PROCEDURE — 85027 COMPLETE CBC AUTOMATED: CPT

## 2024-03-21 PROCEDURE — 84100 ASSAY OF PHOSPHORUS: CPT

## 2024-03-21 PROCEDURE — 80197 ASSAY OF TACROLIMUS: CPT

## 2024-03-22 LAB
TACROLIMUS BLD-MCNC: 4 UG/L (ref 5–15)
TME LAST DOSE: ABNORMAL H
TME LAST DOSE: ABNORMAL H

## 2024-03-27 ENCOUNTER — TELEPHONE (OUTPATIENT)
Dept: TRANSPLANT | Facility: CLINIC | Age: 29
End: 2024-03-27
Payer: COMMERCIAL

## 2024-03-27 NOTE — TELEPHONE ENCOUNTER
Patient Call: General  Route to LPN    Reason for call: Eliza Rn with [facility] looking for update, did patient have his 1 year appointment yet, and overall how's patient doing. Ok to leave VM    Call back needed? Yes    Return Call Needed  Same as documented in contacts section  When to return call?: Greater than one day: Route standard priority

## 2024-03-28 NOTE — TELEPHONE ENCOUNTER
Called patient's mother and let her know Momo would like for patient to reduce Bumex from 2 mg to 1 mg daily and spironolactone from 200 mg to 100 mg daily.  They should let us know if his fluid starts to build up again.  Mother verbalized understanding and had no further questions or concerns.    Tejal DOOLEY LPN  Hepatology Clinic     [facility]    Phone Message    May a detailed message be left on voicemail: yes     Reason for Call: Medication Question or concern regarding medication   Prescription Clarification  Name of Medication: spironolactone (ALDACTONE) 100 MG tablet, bumetanide (BUMEX) 1 MG tablet     Prescribing Provider: Momo Walters PA-C   Pharmacy: [id number] [address]  AT [facility]   What on the order needs clarification? Patient's mother was calling in because they are unsure if the patient should continue taking these prescriptions. She expressed that the patient has lost 50 lbs of water weight in the past week, therefore they would like to know if he should continue taking the medication or not. Please call them back at [phone] to discuss, thank you!          Action Taken: Message routed to:  [facility] HEP    Travel Screening: Not Applicable                                                                       2 seconds or less

## 2024-04-05 ENCOUNTER — TELEPHONE (OUTPATIENT)
Dept: TRANSPLANT | Facility: CLINIC | Age: 29
End: 2024-04-05
Payer: COMMERCIAL

## 2024-04-05 DIAGNOSIS — Z13.220 LIPID SCREENING: ICD-10-CM

## 2024-04-05 DIAGNOSIS — K70.31 ALCOHOLIC CIRRHOSIS OF LIVER WITH ASCITES (H): Chronic | ICD-10-CM

## 2024-04-05 DIAGNOSIS — Z94.4 LIVER REPLACED BY TRANSPLANT (H): Primary | ICD-10-CM

## 2024-04-05 NOTE — TELEPHONE ENCOUNTER
General  Route to LPN    Reason for call: Leticia calling for Apolonia DOOLEY with answer to lab question. Leticia takes Dillon to [facility] for labs   She says call back if any questions     Call back needed? No

## 2024-04-05 NOTE — TELEPHONE ENCOUNTER
Left  message for Leticia asking if pt still has home care or getting labs done at a clinic. Writer is updating lab orders.

## 2024-04-05 NOTE — LETTER
OUTPATIENT LABORATORY TEST ORDER   Patient copy/reminder    Patient Name: Dillon Salter   YOB: 1995     [facility] MR# [if applicable]: [id number]   Date & Time: April 9, 2024  10:21 AM  Expiration Date: 1 year after date issued     Diagnosis: Liver Transplant (ICD-10 Z94.4)   Aftercare following organ transplant (ICD-10 Z48.288)   Long term use of medications (ICD-10 Z79.899)      We ask your assistance in obtaining the following laboratory tests, which are part of our routine surveillance program for Solid Organ Transplant patients.     Please fax each result to [phone], same day as resulted/available    Critical lab results page [phone]    >Every 6-months post-transplant  Magnesium with the next lab draw    >1-year post-transplant  Every 2-3 months  CBC with Platelets   Basic Metabolic Panel   Hepatic panel   Tacrolimus drug level - 12 hour trough, please document time of last dose     >1-year post-transplant  Labs annually due April 2024  Fasting Lipid Panel  Urine protein/creatinine ratio  UA with reflex to micro  Phosphorous    Labs annually due March 2025  Phoshatdylethanol (PeTH)    If you have any questions, please call [facility]- [phone] or [phone], Fax- [phone].    .

## 2024-05-11 ENCOUNTER — HEALTH MAINTENANCE LETTER (OUTPATIENT)
Age: 29
End: 2024-05-11

## 2024-05-15 ENCOUNTER — LAB (OUTPATIENT)
Dept: LAB | Facility: CLINIC | Age: 29
End: 2024-05-15
Payer: COMMERCIAL

## 2024-05-15 DIAGNOSIS — Z94.4 LIVER REPLACED BY TRANSPLANT (H): ICD-10-CM

## 2024-05-15 LAB
ALBUMIN MFR UR ELPH: 8.8 MG/DL
ALBUMIN UR-MCNC: NEGATIVE MG/DL
APPEARANCE UR: CLEAR
BILIRUB UR QL STRIP: NEGATIVE
COLOR UR AUTO: NORMAL
CREAT UR-MCNC: 103 MG/DL
GLUCOSE UR STRIP-MCNC: NEGATIVE MG/DL
HGB UR QL STRIP: NEGATIVE
KETONES UR STRIP-MCNC: NEGATIVE MG/DL
LEUKOCYTE ESTERASE UR QL STRIP: NEGATIVE
MAGNESIUM SERPL-MCNC: 1.8 MG/DL (ref 1.7–2.3)
NITRATE UR QL: NEGATIVE
PH UR STRIP: 5.5 [PH] (ref 5–7)
PHOSPHATE SERPL-MCNC: 4 MG/DL (ref 2.5–4.5)
PROT/CREAT 24H UR: 0.09 MG/MG CR (ref 0–0.2)
SP GR UR STRIP: 1.02 (ref 1–1.03)
UROBILINOGEN UR STRIP-MCNC: <2 MG/DL

## 2024-05-15 PROCEDURE — 87517 HEPATITIS B DNA QUANT: CPT

## 2024-05-15 PROCEDURE — 83735 ASSAY OF MAGNESIUM: CPT

## 2024-05-15 PROCEDURE — 84156 ASSAY OF PROTEIN URINE: CPT

## 2024-05-15 PROCEDURE — 81003 URINALYSIS AUTO W/O SCOPE: CPT

## 2024-05-15 PROCEDURE — 36415 COLL VENOUS BLD VENIPUNCTURE: CPT

## 2024-05-15 PROCEDURE — 84100 ASSAY OF PHOSPHORUS: CPT

## 2024-05-17 LAB — HBV DNA SERPL NAA+PROBE-ACNC: NOT DETECTED IU/ML

## 2024-06-19 NOTE — PROGRESS NOTES
CLINICAL NUTRITION SERVICES - BRIEF NOTE      Reason for RD note: Following up on kcal cts - transplant surgeon onboard with removal of FT.     New Findings/Chart Review:  Nutrition support: Novasource Renal (or equivalent) @ 50 ml/hr x 12 hrs, 8pm-8am (600 ml) + 1 pkts ProSource TF20 TID (3 pkts total) provides 1440 kcal (22 kcal/kg, or 73% minimum assessed kcal needs), 114 g pro (1.7 g/kg), 110 g CHO, 430 ml free water, and 0 g fiber daily.     Enteral Access: NDT    Oral Diet/Intake: PO greatly improved yesterday. Appears to be relying heavily on food from OSH. Pt's mom at bedside with pt, supportive.   3/16       Total Kcals: 2036     Total Protein: 85g  Kcals from Hospital Food: 926                       Protein: 59g  Kcals from Outside Food (average):1110      Protein: 26g  # Meals Ordered from Kitchen: 2 meals + food from outside the hospital   # Meals Recorded: 3 meals (First - 100%  w/ barbeque sauce, 1.25 skim milks)                                                   (Second - 100% 6 inch turkey & cheese sub from [employer], Sun Chips, chocolate cookie - from outside the hospital)                                                   (Third - 100% large strawberry banana smoothie from coffee shop)  # Supplements Recorded: 100% 2 Ensure Plus High Protein     Nutrition history: Pt reports not making his own meals, but reheats his own food, cleans up after himself. His mother and father prepare the meals. He does like to go out for protein smoothies at a local small store called [employer] fairly often - he will check that raw fruit juices (which are a food safety concern) aren't used in their menu items.     Interventions:  Per transplant team, FT will be removed d/t improved PO. Writer in agreement. Will continue to monitor PO.      Discontinued TF orders (TF, ProSource, Fiber)    Nutrition education (pt & pt's mother at bedside): Provided follow-up post-transplant education on high protein sources, food  Daily Note     Today's date: 2024  Patient name: Gabriel Gonzalez  : 1949  MRN: [id number]  Referring provider: Lawanda Joshi CRNP  Dx:   Encounter Diagnosis     ICD-10-CM    1. Parkinson's disease without dyskinesia, unspecified whether manifestations fluctuate  G20.A1       2. Gait abnormality  R26.9           Start Time: 930  Stop Time: 1015  Total time in clinic (min): 45 minutes    Subjective: pt reports he has been having some lightheadedness and has a doctors appt on Tuesday to address      Objective: See treatment diary below      Assessment: Tolerated treatment well. Patient would benefit from continued PT BP assessed due to dizziness reported over the last week.  pt reports he is not symptomatic at the moment but feels he could become lightheaded. Cardio exercises performed on the nustep today rather than TM due to low BP. Edu provided on LE muscle pump and performing ankle pumps seated marches or other movement before standing quickly to avoid orthostatic hypotensive episodes. Attempted weighted ball bounce however pt reports SOB and light headedness, rest and regressed to basketball bounce.      Plan: Continue per plan of care.      Short Term Goal Expiration Date:(24)  Long Term Goal Expiration Date: (24)  POC Expiration Date: (24)        POC expires Unit limit Auth Expiration date PT/OT/ST + Visit Limit?   24 BOMN N/a BOMN                           Visit/Unit Tracking  AUTH Status:  Date 5/17 5/31 6/7 6/19 3/12 3/22 3/29 4/5 4/12 4/26 5/10    N/a Used 4/10 5/10 1/10 2/10 1/10 2/10 3/10 4/10 1/10 2/0 3/10    Remaining                     Precautions parkinson's, Lewy body dementia       Manuals                                    Neuro Re-Ed     Fga  Abc  3 m bwd walk  4 square step  10 mwt    steps        Compliant surface  SOLO    Weight shifting   X20 fwd bwd   SOLO    Up and over 5 airex    X2 laps fwd  X2 laps lateral   Side step  SOLO    W 6# MB bounce for inc power generation    X1 laps    Basketball bounce  X1 lap    3# AW SOLO    W basketball bounce for inc power generation    X3 laps SOLO    W basketball bounce for inc power generation    X3 laps     Bwd walk  SOLO    Fwd bwd shuttle    4 cones   X2 rounds  2.5# AW      HKM  SOLO    X3 laps 1/4 track    2.5# AW      hurdles Solo  3# AW    6'' (6)    X3 laps fwd  X2 lateral  Solo    3# AW    6'' (6)    X3 laps fwd  X2 lateral  SOLO    6'' (6)    X3 laps fwd  X2 lateral   Shuffle steps        rotation  SOLO    Standing lateral ball toss w rotation x10 ea      Dual task   Walking ball toss   SOLO    X3 laps w VC for for big steps     Multi directional stepping SOLO    Hex ladder in hexagon shape    X3 clockwise steps  X3 counterclockwise steps    3# AW SOLO    Hex ladder in hexagon shape    X3 clockwise steps  X3 counterclockwise steps    2.5# AW      Ther Ex Vitals assessment   6 mwt  5x sts    nustep Lvl 7 x10 min    Lvl 6 x10 min    TM  SOLO    Chalk visual cue    0.9 mph    7 min SOLO    Chalk visual cue    0.9 mph    10 min  SOLO    Chalk visual cue    0.9 mph    6 min  Rest  4 min   STS        Postural strength         T/S ext         T/S rot         Open book         Bridge         Supine figure 4 stretch         LTR         Seated hip abd  BTB  3x10      Ther Activity                        Gait Training                        Modalities                              Access Code: [email or web address]  URL: [email or web address]  Date: 03/07/2024  Prepared by: Caterina Rust    Exercises  - Standing March with Counter Support  - 1 x daily - 6 x weekly - 3 sets - 10 reps  - Heel Toe Raises with Unilateral Counter Support  - 1 x daily - 6 x weekly - 3 sets - 10 reps  - Sit to Stand with Armchair  - 1 x daily - 6 x weekly - 3 sets - 8 reps  - Sideways Walking  - 1 x daily - 6 x weekly - 3 sets - 10 reps  - Seated Hip Abduction with Resistance  - 1 x daily - 6 x weekly - 3 sets - 10  safety precautions. Pt's mother still has handouts and has briefly reviewed materials. She recalls his protein goal of 100 g daily 6-8 weeks post transplant. Writer reviewed continued higher protein intake with dialysis d/t protein losses during the process. Writer emphasized food safety, particularly that of lunch/deli meats and to heat until steaming hot as pt had a deli [employer] sandwich yesterday. Reviewed main education points with patient & pt's mother. Encouraged pt continue oral intake like he did yesterday given he did very well with intake. Pt is very relieved to be having FT removed, he's frustrated with lack of appetite with feeds running.    Future/Additional Recommendations:  Monitor PO adequacy.     Nutrition will continue to follow per protocol.    Karlee Sim RD, LD  Pager: [phone]   reps      Access Code: [email or web address]  URL: [email or web address]  Date: 05/10/2024  Prepared by: Presley Hodgson    Exercises  - Supine Lower Trunk Rotation  - 1 x daily - 7 x weekly - 3 sets - 10 reps  - Supine Figure 4 Piriformis Stretch  - 1 x daily - 7 x weekly - 3 sets - 1 reps - 30 seconds hold  - Supine Bridge  - 1 x daily - 7 x weekly - 3 sets - 10 reps  - Seated Shoulder Row with Anchored Resistance  - 1 x daily - 7 x weekly - 3 sets - 10 reps - 3 seconds hold  - Hooklying Clamshell with Resistance  - 1 x daily - 7 x weekly - 3 sets - 10 reps  - Seated Shoulder Horizontal Abduction with Resistance  - 1 x daily - 7 x weekly - 3 sets - 10 reps  - Shoulder External Rotation with Resistance  - 1 x daily - 7 x weekly - 3 sets - 10 reps  - Seated Thoracic Self-Mobilization  - 1 x daily - 7 x weekly - 3 sets - 10 reps  - Sidelying Open Book Thoracic Lumbar Rotation and Extension  - 1 x daily - 7 x weekly - 3 sets - 10 reps

## 2024-06-20 DIAGNOSIS — Z94.4 LIVER REPLACED BY TRANSPLANT (H): ICD-10-CM

## 2024-06-20 RX ORDER — URSODIOL 300 MG/1
300 CAPSULE ORAL 2 TIMES DAILY
Qty: 180 CAPSULE | Refills: 3 | Status: SHIPPED | OUTPATIENT
Start: 2024-06-20

## 2024-09-28 ENCOUNTER — HEALTH MAINTENANCE LETTER (OUTPATIENT)
Age: 29
End: 2024-09-28

## 2024-11-07 ENCOUNTER — TELEPHONE (OUTPATIENT)
Dept: TRANSPLANT | Facility: CLINIC | Age: 29
End: 2024-11-07

## 2024-11-07 ENCOUNTER — OFFICE VISIT (OUTPATIENT)
Dept: GASTROENTEROLOGY | Facility: CLINIC | Age: 29
End: 2024-11-07
Attending: INTERNAL MEDICINE
Payer: MEDICARE

## 2024-11-07 ENCOUNTER — LAB (OUTPATIENT)
Dept: LAB | Facility: CLINIC | Age: 29
End: 2024-11-07
Payer: MEDICARE

## 2024-11-07 VITALS
HEART RATE: 66 BPM | WEIGHT: 172 LBS | SYSTOLIC BLOOD PRESSURE: 123 MMHG | DIASTOLIC BLOOD PRESSURE: 76 MMHG | BODY MASS INDEX: 26.15 KG/M2

## 2024-11-07 DIAGNOSIS — F41.9 ANXIETY AND DEPRESSION: ICD-10-CM

## 2024-11-07 DIAGNOSIS — Z94.4 LIVER REPLACED BY TRANSPLANT (H): Primary | ICD-10-CM

## 2024-11-07 DIAGNOSIS — Z94.4 LIVER REPLACED BY TRANSPLANT (H): ICD-10-CM

## 2024-11-07 DIAGNOSIS — F32.A ANXIETY AND DEPRESSION: ICD-10-CM

## 2024-11-07 LAB
ALBUMIN SERPL BCG-MCNC: 4.1 G/DL (ref 3.5–5.2)
ALP SERPL-CCNC: 358 U/L (ref 40–150)
ALT SERPL W P-5'-P-CCNC: 27 U/L (ref 0–70)
ANION GAP SERPL CALCULATED.3IONS-SCNC: 7 MMOL/L (ref 7–15)
AST SERPL W P-5'-P-CCNC: 27 U/L (ref 0–45)
BASOPHILS # BLD AUTO: 0.1 10E3/UL (ref 0–0.2)
BASOPHILS NFR BLD AUTO: 1 %
BILIRUB DIRECT SERPL-MCNC: <0.2 MG/DL (ref 0–0.3)
BILIRUB SERPL-MCNC: 0.5 MG/DL
BUN SERPL-MCNC: 33.4 MG/DL (ref 6–20)
CALCIUM SERPL-MCNC: 9.7 MG/DL (ref 8.8–10.4)
CHLORIDE SERPL-SCNC: 103 MMOL/L (ref 98–107)
CREAT SERPL-MCNC: 1.54 MG/DL (ref 0.67–1.17)
EGFRCR SERPLBLD CKD-EPI 2021: 61 ML/MIN/1.73M2
EOSINOPHIL # BLD AUTO: 0.1 10E3/UL (ref 0–0.7)
EOSINOPHIL NFR BLD AUTO: 2 %
ERYTHROCYTE [DISTWIDTH] IN BLOOD BY AUTOMATED COUNT: 12.5 % (ref 10–15)
GLUCOSE SERPL-MCNC: 187 MG/DL (ref 70–99)
HCO3 SERPL-SCNC: 26 MMOL/L (ref 22–29)
HCT VFR BLD AUTO: 31.7 % (ref 40–53)
HGB BLD-MCNC: 10.5 G/DL (ref 13.3–17.7)
IMM GRANULOCYTES # BLD: 0.1 10E3/UL
IMM GRANULOCYTES NFR BLD: 1 %
LYMPHOCYTES # BLD AUTO: 1 10E3/UL (ref 0.8–5.3)
LYMPHOCYTES NFR BLD AUTO: 19 %
MCH RBC QN AUTO: 29 PG (ref 26.5–33)
MCHC RBC AUTO-ENTMCNC: 33.1 G/DL (ref 31.5–36.5)
MCV RBC AUTO: 88 FL (ref 78–100)
MONOCYTES # BLD AUTO: 0.2 10E3/UL (ref 0–1.3)
MONOCYTES NFR BLD AUTO: 4 %
NEUTROPHILS # BLD AUTO: 4.1 10E3/UL (ref 1.6–8.3)
NEUTROPHILS NFR BLD AUTO: 74 %
NRBC # BLD AUTO: 0 10E3/UL
NRBC BLD AUTO-RTO: 0 /100
PLATELET # BLD AUTO: 141 10E3/UL (ref 150–450)
POTASSIUM SERPL-SCNC: 6.1 MMOL/L (ref 3.4–5.3)
PROT SERPL-MCNC: 7.2 G/DL (ref 6.4–8.3)
RBC # BLD AUTO: 3.62 10E6/UL (ref 4.4–5.9)
SODIUM SERPL-SCNC: 136 MMOL/L (ref 135–145)
TACROLIMUS BLD-MCNC: 5.7 UG/L (ref 5–15)
TME LAST DOSE: NORMAL H
TME LAST DOSE: NORMAL H
WBC # BLD AUTO: 5.5 10E3/UL (ref 4–11)
WBC # BLD AUTO: 5.5 10E3/UL (ref 4–11)

## 2024-11-07 PROCEDURE — 80053 COMPREHEN METABOLIC PANEL: CPT | Performed by: PATHOLOGY

## 2024-11-07 PROCEDURE — 85025 COMPLETE CBC W/AUTO DIFF WBC: CPT | Performed by: PATHOLOGY

## 2024-11-07 PROCEDURE — 82248 BILIRUBIN DIRECT: CPT | Performed by: PATHOLOGY

## 2024-11-07 PROCEDURE — 99215 OFFICE O/P EST HI 40 MIN: CPT | Performed by: INTERNAL MEDICINE

## 2024-11-07 PROCEDURE — 36415 COLL VENOUS BLD VENIPUNCTURE: CPT | Performed by: PATHOLOGY

## 2024-11-07 PROCEDURE — 99000 SPECIMEN HANDLING OFFICE-LAB: CPT | Performed by: PATHOLOGY

## 2024-11-07 PROCEDURE — G0463 HOSPITAL OUTPT CLINIC VISIT: HCPCS | Performed by: INTERNAL MEDICINE

## 2024-11-07 PROCEDURE — 80197 ASSAY OF TACROLIMUS: CPT | Performed by: INTERNAL MEDICINE

## 2024-11-07 RX ORDER — LORAZEPAM 0.5 MG/1
0.5 TABLET ORAL EVERY 6 HOURS PRN
Qty: 1 TABLET | Refills: 0 | Status: CANCELLED | OUTPATIENT
Start: 2024-11-07

## 2024-11-07 RX ORDER — LORAZEPAM 0.5 MG/1
0.5 TABLET ORAL ONCE
Status: SHIPPED
Start: 2024-11-07 | End: 2024-11-07

## 2024-11-07 ASSESSMENT — PAIN SCALES - GENERAL: PAINLEVEL_OUTOF10: NO PAIN (0)

## 2024-11-07 NOTE — LETTER
11/7/2024      Dillon Salter  [address] [place] [address]      Dear Colleague,    Thank you for referring your patient, Dillon Salter, to the [facility] [place]. Please see a copy of my visit note below.    Solid Organ Transplant Hepatology Follow-Up Visit:     HISTORY OF PRESENT ILLNESS:   I had the pleasure of seeing Dillon Salter for followup in the Liver Clinic at the [facility] on November 7, 2024. Mr. Salter returns for followup now 20 months status post liver transplantation for alcoholic cirrhosis.     His posttransplant course was complicated by development of a biliary stricture.  He has had ERCPs and stenting.  He also did have acute kidney injury and renal insufficiency, which has resolved.  He was last seen somewhat more than 1 year ago.  He reports absolutely no alcohol use now for almost 3 years     At this point in time, he feels well.  He denies any abdominal pain, itching, skin rash, or fatigue.  He denies any increased abdominal girth or lower extremity edema.     He denies any fevers or chills, cough or shortness of breath.  He denies any nausea or vomiting, diarrhea or constipation.  He reports his appetite is improving and his weight is stable.    He does report his blood sugars have been under good control and is going to be seeing a diabetic educator later this month.  He does have a continuous glucose monitor and is going to be trained on that.    Medications:   Current Outpatient Medications   Medication Sig Dispense Refill     acetaminophen (TYLENOL) 325 MG tablet 2 tablets (650 mg) by Oral or Feeding Tube route every 8 hours as needed for mild pain or fever 100 tablet 0     FLUoxetine (PROZAC) 20 MG capsule Take 3 capsules (60 mg) by mouth At Bedtime 90 capsule 0     gabapentin (NEURONTIN) 100 MG capsule Take 100 mg by mouth       insulin glargine (LANTUS PEN) 100 UNIT/ML pen Inject 15 Units Subcutaneous every  morning AND 10 Units At Bedtime. (Patient taking differently: 18 units at Bedtime ) 15 mL 0     magnesium glycinate 100 MG CAPS capsule Take 2 capsules (200 mg) by mouth daily 180 capsule 3     metoprolol tartrate (LOPRESSOR) 25 MG tablet Take 1 tablet (25 mg) by mouth 2 times daily 60 tablet 1     oxyCODONE (ROXICODONE) 5 MG tablet Take 5 mg by mouth every 6 hours as needed for severe pain From PCP       tacrolimus (GENERIC) 0.5 MG capsule Take 1 capsule (0.5 mg) by mouth 2 times daily Total dose 1.5 mg  capsule 3     tacrolimus (GENERIC) 1 MG capsule Take 1 capsule (1 mg) by mouth every 12 hours 180 capsule 3     ursodiol (ACTIGALL) 300 MG capsule Take 1 capsule (300 mg) by mouth 2 times daily 180 capsule 3     Vitamin D3 (CHOLECALCIFEROL) 25 mcg (1000 units) tablet 1 tablet (25 mcg) by Oral or Feeding Tube route daily (Patient taking differently: Take 25 mcg by mouth or Feeding Tube every morning.) 90 tablet 3     insulin aspart (NOVOLOG FLEXPEN) 100 UNIT/ML pen 1 unit per 10 grams of carb, plus additional units based on following scale   For Pre-Meal  - 164 give 1 unit. For Pre-Meal  - 189 give 2 units. For Pre-Meal  - 214 give 3 units. For Pre-Meal  - 239 give 4 units. For Pre-Meal  - 264 give 5 units. For Pre-Meal  - 289 give 6 units. For Pre-Meal  - 314 give 7 units. For Pre-Meal  - 339 give 8 units. For Pre-Meal  - 364 give 9 units.  For Pre-Meal BG greater than or equal to 365 give 10 units (Patient not taking: Reported on 11/7/2024) 15 mL 3     multivitamin w/minerals (THERA-VIT-M) tablet Take 1 tablet by mouth daily (Patient not taking: Reported on 11/7/2024)       No current facility-administered medications for this visit.      Vitals:   /76   Pulse 66   Wt 78 kg (172 lb)   BMI 26.15 kg/m      Physical Exam:   In general he looks quite well. HEENT exam shows no scleral icterus or temporal muscle wasting. Chest is clear. Abdominal exam  shows no increase in girth. No masses or tenderness to palpation are present. Liver is 10 cm in span without left lobe enlargement. No spleen tip is palpable.  His incision is intact.  Extremity exam shows no edema. Skin exam shows no suspicious lesions and neurologic exam is nonfocal.     Labs:   Recent Results (from the past week)   Basic metabolic panel    Collection Time: 11/07/24  8:10 AM   Result Value Ref Range    Sodium 136 135 - 145 mmol/L    Potassium 6.1 (HH) 3.4 - 5.3 mmol/L    Chloride 103 98 - 107 mmol/L    Carbon Dioxide (CO2) 26 22 - 29 mmol/L    Anion Gap 7 7 - 15 mmol/L    Urea Nitrogen 33.4 (H) 6.0 - 20.0 mg/dL    Creatinine 1.54 (H) 0.67 - 1.17 mg/dL    GFR Estimate 61 >60 mL/min/1.73m2    Calcium 9.7 8.8 - 10.4 mg/dL    Glucose 187 (H) 70 - 99 mg/dL   Hepatic panel    Collection Time: 11/07/24  8:10 AM   Result Value Ref Range    Protein Total 7.2 6.4 - 8.3 g/dL    Albumin 4.1 3.5 - 5.2 g/dL    Bilirubin Total 0.5 <=1.2 mg/dL    Alkaline Phosphatase 358 (H) 40 - 150 U/L    AST 27 0 - 45 U/L    ALT 27 0 - 70 U/L    Bilirubin Direct <0.20 0.00 - 0.30 mg/dL   CBC with platelets    Collection Time: 11/07/24  8:10 AM   Result Value Ref Range    WBC Count 5.5 4.0 - 11.0 10e3/uL    RBC Count 3.62 (L) 4.40 - 5.90 10e6/uL    Hemoglobin 10.5 (L) 13.3 - 17.7 g/dL    Hematocrit 31.7 (L) 40.0 - 53.0 %    MCV 88 78 - 100 fL    MCH 29.0 26.5 - 33.0 pg    MCHC 33.1 31.5 - 36.5 g/dL    RDW 12.5 10.0 - 15.0 %    Platelet Count 141 (L) 150 - 450 10e3/uL   WBC and Differential    Collection Time: 11/07/24  8:10 AM   Result Value Ref Range    WBC Count 5.5 4.0 - 11.0 10e3/uL    % Neutrophils 74 %    % Lymphocytes 19 %    % Monocytes 4 %    % Eosinophils 2 %    % Basophils 1 %    % Immature Granulocytes 1 %    NRBCs per 100 WBC 0 <1 /100    Absolute Neutrophils 4.1 1.6 - 8.3 10e3/uL    Absolute Lymphocytes 1.0 0.8 - 5.3 10e3/uL    Absolute Monocytes 0.2 0.0 - 1.3 10e3/uL    Absolute Eosinophils 0.1 0.0 - 0.7 10e3/uL     Absolute Basophils 0.1 0.0 - 0.2 10e3/uL    Absolute Immature Granulocytes 0.1 <=0.4 10e3/uL    Absolute NRBCs 0.0 10e3/uL      Imaging:   No images are attached to the encounter.     Assessment/Plan:   IMPRESSION:   Mr. Salter is 20 months status post liver transplantation and really doing  well.  We will wean him off CellCept over the next 6 weeks.  He is only on tacrolimus for immunosuppression.  His potassium is elevated as his creatinine but he has been using nonsteroidal anti-inflammatory drugs which I have advised him not to use.  He does need something for pain after recommended acetaminophen.  We will repeat his tests on Monday.  He also has an elevation of alkaline phosphatase and a history of biliary complications.  I will check an MRCP.    As I mentioned, he is seeing a diabetic nurse educator later this month and will be placed on a continuous glucose monitor.  He is up-to-date with regard to vaccines.  Otherwise, all other complications are well addressed, and I will see him back in the clinic in 6 months.     I did spend a total of 40 minutes (on the date of the encounter), including 30 minutes of face-to-face clinic time including counseling. The rest of the time was spent in documentation and review of records.    Thank you very much for allowing me to participate in the care of this patient.  If you have any questions regarding my recommendations, please do not hesitate to contact me.         Sergio Wood MD      Professor of Medicine  [employer]      Executive Medical Director, Solid Organ Transplant Program  [facility]      Again, thank you for allowing me to participate in the care of your patient.        Sincerely,        Sergio Wood MD

## 2024-11-07 NOTE — TELEPHONE ENCOUNTER
"Notified Dr. Wood of K and creatinine and use of ibuprofen. Dr. Wood replied \"Let's wait for the tac level and maybe repeat it Monday. He knows not to take NSAIDs which can increase K+. He looked good.\"    Dr. Wood also informed [facility] that he is getting an MRCP due to elevated alk phos and h/o biliary issues.   "

## 2024-11-07 NOTE — PROGRESS NOTES
Solid Organ Transplant Hepatology Follow-Up Visit:     HISTORY OF PRESENT ILLNESS:   I had the pleasure of seeing Dillon Salter for followup in the Liver Clinic at the [facility] on November 7, 2024. Mr. Salter returns for followup now 20 months status post liver transplantation for alcoholic cirrhosis.     His posttransplant course was complicated by development of a biliary stricture.  He has had ERCPs and stenting.  He also did have acute kidney injury and renal insufficiency, which has resolved.  He was last seen somewhat more than 1 year ago.  He reports absolutely no alcohol use now for almost 3 years     At this point in time, he feels well.  He denies any abdominal pain, itching, skin rash, or fatigue.  He denies any increased abdominal girth or lower extremity edema.     He denies any fevers or chills, cough or shortness of breath.  He denies any nausea or vomiting, diarrhea or constipation.  He reports his appetite is improving and his weight is stable.    He does report his blood sugars have been under good control and is going to be seeing a diabetic educator later this month.  He does have a continuous glucose monitor and is going to be trained on that.    Medications:   Current Outpatient Medications   Medication Sig Dispense Refill    acetaminophen (TYLENOL) 325 MG tablet 2 tablets (650 mg) by Oral or Feeding Tube route every 8 hours as needed for mild pain or fever 100 tablet 0    FLUoxetine (PROZAC) 20 MG capsule Take 3 capsules (60 mg) by mouth At Bedtime 90 capsule 0    gabapentin (NEURONTIN) 100 MG capsule Take 100 mg by mouth      insulin glargine (LANTUS PEN) 100 UNIT/ML pen Inject 15 Units Subcutaneous every morning AND 10 Units At Bedtime. (Patient taking differently: 18 units at Bedtime ) 15 mL 0    magnesium glycinate 100 MG CAPS capsule Take 2 capsules (200 mg) by mouth daily 180 capsule 3    metoprolol tartrate (LOPRESSOR) 25 MG tablet Take 1 tablet (25 mg) by  mouth 2 times daily 60 tablet 1    oxyCODONE (ROXICODONE) 5 MG tablet Take 5 mg by mouth every 6 hours as needed for severe pain From PCP      tacrolimus (GENERIC) 0.5 MG capsule Take 1 capsule (0.5 mg) by mouth 2 times daily Total dose 1.5 mg  capsule 3    tacrolimus (GENERIC) 1 MG capsule Take 1 capsule (1 mg) by mouth every 12 hours 180 capsule 3    ursodiol (ACTIGALL) 300 MG capsule Take 1 capsule (300 mg) by mouth 2 times daily 180 capsule 3    Vitamin D3 (CHOLECALCIFEROL) 25 mcg (1000 units) tablet 1 tablet (25 mcg) by Oral or Feeding Tube route daily (Patient taking differently: Take 25 mcg by mouth or Feeding Tube every morning.) 90 tablet 3    insulin aspart (NOVOLOG FLEXPEN) 100 UNIT/ML pen 1 unit per 10 grams of carb, plus additional units based on following scale   For Pre-Meal  - 164 give 1 unit. For Pre-Meal  - 189 give 2 units. For Pre-Meal  - 214 give 3 units. For Pre-Meal  - 239 give 4 units. For Pre-Meal  - 264 give 5 units. For Pre-Meal  - 289 give 6 units. For Pre-Meal  - 314 give 7 units. For Pre-Meal  - 339 give 8 units. For Pre-Meal  - 364 give 9 units.  For Pre-Meal BG greater than or equal to 365 give 10 units (Patient not taking: Reported on 11/7/2024) 15 mL 3    multivitamin w/minerals (THERA-VIT-M) tablet Take 1 tablet by mouth daily (Patient not taking: Reported on 11/7/2024)       No current facility-administered medications for this visit.      Vitals:   /76   Pulse 66   Wt 78 kg (172 lb)   BMI 26.15 kg/m      Physical Exam:   In general he looks quite well. HEENT exam shows no scleral icterus or temporal muscle wasting. Chest is clear. Abdominal exam shows no increase in girth. No masses or tenderness to palpation are present. Liver is 10 cm in span without left lobe enlargement. No spleen tip is palpable.  His incision is intact.  Extremity exam shows no edema. Skin exam shows no suspicious lesions and neurologic  exam is nonfocal.     Labs:   Recent Results (from the past week)   Basic metabolic panel    Collection Time: 11/07/24  8:10 AM   Result Value Ref Range    Sodium 136 135 - 145 mmol/L    Potassium 6.1 (HH) 3.4 - 5.3 mmol/L    Chloride 103 98 - 107 mmol/L    Carbon Dioxide (CO2) 26 22 - 29 mmol/L    Anion Gap 7 7 - 15 mmol/L    Urea Nitrogen 33.4 (H) 6.0 - 20.0 mg/dL    Creatinine 1.54 (H) 0.67 - 1.17 mg/dL    GFR Estimate 61 >60 mL/min/1.73m2    Calcium 9.7 8.8 - 10.4 mg/dL    Glucose 187 (H) 70 - 99 mg/dL   Hepatic panel    Collection Time: 11/07/24  8:10 AM   Result Value Ref Range    Protein Total 7.2 6.4 - 8.3 g/dL    Albumin 4.1 3.5 - 5.2 g/dL    Bilirubin Total 0.5 <=1.2 mg/dL    Alkaline Phosphatase 358 (H) 40 - 150 U/L    AST 27 0 - 45 U/L    ALT 27 0 - 70 U/L    Bilirubin Direct <0.20 0.00 - 0.30 mg/dL   CBC with platelets    Collection Time: 11/07/24  8:10 AM   Result Value Ref Range    WBC Count 5.5 4.0 - 11.0 10e3/uL    RBC Count 3.62 (L) 4.40 - 5.90 10e6/uL    Hemoglobin 10.5 (L) 13.3 - 17.7 g/dL    Hematocrit 31.7 (L) 40.0 - 53.0 %    MCV 88 78 - 100 fL    MCH 29.0 26.5 - 33.0 pg    MCHC 33.1 31.5 - 36.5 g/dL    RDW 12.5 10.0 - 15.0 %    Platelet Count 141 (L) 150 - 450 10e3/uL   WBC and Differential    Collection Time: 11/07/24  8:10 AM   Result Value Ref Range    WBC Count 5.5 4.0 - 11.0 10e3/uL    % Neutrophils 74 %    % Lymphocytes 19 %    % Monocytes 4 %    % Eosinophils 2 %    % Basophils 1 %    % Immature Granulocytes 1 %    NRBCs per 100 WBC 0 <1 /100    Absolute Neutrophils 4.1 1.6 - 8.3 10e3/uL    Absolute Lymphocytes 1.0 0.8 - 5.3 10e3/uL    Absolute Monocytes 0.2 0.0 - 1.3 10e3/uL    Absolute Eosinophils 0.1 0.0 - 0.7 10e3/uL    Absolute Basophils 0.1 0.0 - 0.2 10e3/uL    Absolute Immature Granulocytes 0.1 <=0.4 10e3/uL    Absolute NRBCs 0.0 10e3/uL      Imaging:   No images are attached to the encounter.     Assessment/Plan:   IMPRESSION:   Mr. Salter is 20 months status post liver  transplantation and really doing  well.  We will wean him off CellCept over the next 6 weeks.  He is only on tacrolimus for immunosuppression.  His potassium is elevated as his creatinine but he has been using nonsteroidal anti-inflammatory drugs which I have advised him not to use.  He does need something for pain after recommended acetaminophen.  We will repeat his tests on Monday.  He also has an elevation of alkaline phosphatase and a history of biliary complications.  I will check an MRCP.    As I mentioned, he is seeing a diabetic nurse educator later this month and will be placed on a continuous glucose monitor.  He is up-to-date with regard to vaccines.  Otherwise, all other complications are well addressed, and I will see him back in the clinic in 6 months.     I did spend a total of 40 minutes (on the date of the encounter), including 30 minutes of face-to-face clinic time including counseling. The rest of the time was spent in documentation and review of records.    Thank you very much for allowing me to participate in the care of this patient.  If you have any questions regarding my recommendations, please do not hesitate to contact me.         Sergio Wood MD      Professor of Medicine  [employer]      Executive Medical Director, Solid Organ Transplant Program  [facility]

## 2024-11-07 NOTE — Clinical Note
Can you call in the ativan prescription to [facility]  in [place] please? Thanks, Evy

## 2024-11-07 NOTE — TELEPHONE ENCOUNTER
DATE:  11/7/2024     TIME OF RECEIPT FROM LAB:  8:54    ORDERING PROVIDER: lake    LAB TEST:  K+    LAB VALUE:  6.1    RESULTS GIVEN WITH READ-BACK TO (PROVIDER):  Evy PEDRAZA LAB VALUE REPORTED TO PROVIDER:   8:54

## 2024-11-07 NOTE — PROGRESS NOTES
Dr. Wood would like to repeat labs on Monday 11/11, Dillon and Leticia aware. Lab orders entered. Will wait for tacrolimus level to see if that will need to be repeated. Tacrolimus level around goal, so will not change dose at this time and just repeat BMP on Monday.    Received message that Dillon is requesting a medication for prior to MRI for claustrophobia. Message to Dr. Wood- Ativan 0.5 mg PO.

## 2024-11-07 NOTE — NURSING NOTE
"Chief Complaint   Patient presents with    RECHECK     Vital signs:      BP: 123/76 Pulse: 66             Weight: 78 kg (172 lb)  Estimated body mass index is 26.15 kg/m  as calculated from the following:    Height as of 11/8/23: 1.727 m (5' 8\").    Weight as of this encounter: 78 kg (172 lb).      Marely Burgos CMA   11/7/2024 8:34 AM    "

## 2024-11-15 ENCOUNTER — LAB (OUTPATIENT)
Dept: LAB | Facility: CLINIC | Age: 29
End: 2024-11-15
Payer: MEDICARE

## 2024-11-15 DIAGNOSIS — Z94.4 LIVER REPLACED BY TRANSPLANT (H): ICD-10-CM

## 2024-11-15 LAB
ANION GAP SERPL CALCULATED.3IONS-SCNC: 10 MMOL/L (ref 7–15)
BUN SERPL-MCNC: 23.7 MG/DL (ref 6–20)
CALCIUM SERPL-MCNC: 10 MG/DL (ref 8.8–10.4)
CHLORIDE SERPL-SCNC: 96 MMOL/L (ref 98–107)
CREAT SERPL-MCNC: 1.62 MG/DL (ref 0.67–1.17)
EGFRCR SERPLBLD CKD-EPI 2021: 58 ML/MIN/1.73M2
FERRITIN SERPL-MCNC: 1014 NG/ML (ref 31–409)
GLUCOSE SERPL-MCNC: 194 MG/DL (ref 70–99)
HCO3 SERPL-SCNC: 26 MMOL/L (ref 22–29)
POTASSIUM SERPL-SCNC: 5.4 MMOL/L (ref 3.4–5.3)
SODIUM SERPL-SCNC: 132 MMOL/L (ref 135–145)

## 2024-11-15 PROCEDURE — 80321 ALCOHOLS BIOMARKERS 1OR 2: CPT | Mod: GA

## 2024-11-15 PROCEDURE — 82728 ASSAY OF FERRITIN: CPT | Mod: GA

## 2024-11-15 PROCEDURE — 80048 BASIC METABOLIC PNL TOTAL CA: CPT

## 2024-11-15 PROCEDURE — 36415 COLL VENOUS BLD VENIPUNCTURE: CPT

## 2024-11-15 PROCEDURE — 82310 ASSAY OF CALCIUM: CPT

## 2024-11-28 NOTE — ADDENDUM NOTE
Addended by: WALE LOPEZ on: 9/1/2022 09:15 AM     Modules accepted: Orders    
Simple: Patient demonstrates quick and easy understanding

## 2024-12-03 DIAGNOSIS — Z94.4 LIVER REPLACED BY TRANSPLANT (H): ICD-10-CM

## 2024-12-03 RX ORDER — TACROLIMUS 1 MG/1
1 CAPSULE ORAL EVERY 12 HOURS
Qty: 180 CAPSULE | Refills: 3 | Status: SHIPPED | OUTPATIENT
Start: 2024-12-03

## 2024-12-07 ENCOUNTER — HEALTH MAINTENANCE LETTER (OUTPATIENT)
Age: 29
End: 2024-12-07

## 2025-01-17 ENCOUNTER — HOSPITAL ENCOUNTER (OUTPATIENT)
Dept: MRI IMAGING | Facility: CLINIC | Age: 30
Discharge: HOME | End: 2025-01-17
Attending: INTERNAL MEDICINE | Admitting: INTERNAL MEDICINE
Payer: MEDICARE

## 2025-01-17 DIAGNOSIS — Z94.4 LIVER REPLACED BY TRANSPLANT (H): ICD-10-CM

## 2025-01-17 PROCEDURE — A9585 GADOBUTROL INJECTION: HCPCS | Performed by: INTERNAL MEDICINE

## 2025-01-17 PROCEDURE — 255N000002 HC RX 255 OP 636: Performed by: INTERNAL MEDICINE

## 2025-01-17 PROCEDURE — 74183 MRI ABD W/O CNTR FLWD CNTR: CPT

## 2025-01-17 RX ORDER — GADOBUTROL 604.72 MG/ML
8 INJECTION INTRAVENOUS ONCE
Status: COMPLETED | OUTPATIENT
Start: 2025-01-17 | End: 2025-01-17

## 2025-01-17 RX ADMIN — GADOBUTROL 8 ML: 604.72 INJECTION INTRAVENOUS at 15:44

## 2025-01-18 ENCOUNTER — HEALTH MAINTENANCE LETTER (OUTPATIENT)
Age: 30
End: 2025-01-18

## 2025-01-24 ENCOUNTER — PREP FOR PROCEDURE (OUTPATIENT)
Dept: GASTROENTEROLOGY | Facility: CLINIC | Age: 30
End: 2025-01-24
Payer: MEDICARE

## 2025-01-24 DIAGNOSIS — K83.1 BILIARY STRICTURE (H): Primary | ICD-10-CM

## 2025-01-24 NOTE — PROGRESS NOTES
Please assist in scheduling:     Procedure/Imaging/Clinic: ERCP (Endoscopic Retrograde Cholangiopancreatography)  Physician: Kyle  Timing: Next available  Scope time: Provider average   Anesthesia: General  Dx: Biliary stricture  Tier:3  Location: [facility]  OK to schedule while attending: Not specified by provider  Patient communication letter header:ERCP (Endoscopic Retrograde Cholangiopancreatography)

## 2025-01-27 ENCOUNTER — LAB (OUTPATIENT)
Dept: LAB | Facility: CLINIC | Age: 30
End: 2025-01-27
Payer: MEDICARE

## 2025-01-27 ENCOUNTER — TELEPHONE (OUTPATIENT)
Dept: TRANSPLANT | Facility: CLINIC | Age: 30
End: 2025-01-27

## 2025-01-27 ENCOUNTER — HOSPITAL ENCOUNTER (OUTPATIENT)
Facility: CLINIC | Age: 30
End: 2025-01-27
Attending: INTERNAL MEDICINE | Admitting: INTERNAL MEDICINE
Payer: MEDICARE

## 2025-01-27 DIAGNOSIS — Z94.4 LIVER REPLACED BY TRANSPLANT (H): ICD-10-CM

## 2025-01-27 LAB
ALBUMIN SERPL BCG-MCNC: 4.2 G/DL (ref 3.5–5.2)
ALP SERPL-CCNC: 391 U/L (ref 40–150)
ALT SERPL W P-5'-P-CCNC: 29 U/L (ref 0–70)
ANION GAP SERPL CALCULATED.3IONS-SCNC: 9 MMOL/L (ref 7–15)
AST SERPL W P-5'-P-CCNC: 27 U/L (ref 0–45)
BILIRUB DIRECT SERPL-MCNC: <0.2 MG/DL (ref 0–0.3)
BILIRUB SERPL-MCNC: 0.6 MG/DL
BUN SERPL-MCNC: 35.7 MG/DL (ref 6–20)
CALCIUM SERPL-MCNC: 9.8 MG/DL (ref 8.8–10.4)
CHLORIDE SERPL-SCNC: 106 MMOL/L (ref 98–107)
CREAT SERPL-MCNC: 1.55 MG/DL (ref 0.67–1.17)
EGFRCR SERPLBLD CKD-EPI 2021: 61 ML/MIN/1.73M2
ERYTHROCYTE [DISTWIDTH] IN BLOOD BY AUTOMATED COUNT: 13.2 % (ref 10–15)
GLUCOSE SERPL-MCNC: 109 MG/DL (ref 70–99)
HCO3 SERPL-SCNC: 25 MMOL/L (ref 22–29)
HCT VFR BLD AUTO: 33.9 % (ref 40–53)
HGB BLD-MCNC: 11.3 G/DL (ref 13.3–17.7)
MCH RBC QN AUTO: 29.2 PG (ref 26.5–33)
MCHC RBC AUTO-ENTMCNC: 33.3 G/DL (ref 31.5–36.5)
MCV RBC AUTO: 88 FL (ref 78–100)
PLATELET # BLD AUTO: 181 10E3/UL (ref 150–450)
POTASSIUM SERPL-SCNC: 6.1 MMOL/L (ref 3.4–5.3)
PROT SERPL-MCNC: 7.2 G/DL (ref 6.4–8.3)
RBC # BLD AUTO: 3.87 10E6/UL (ref 4.4–5.9)
SODIUM SERPL-SCNC: 140 MMOL/L (ref 135–145)
WBC # BLD AUTO: 6.6 10E3/UL (ref 4–11)

## 2025-01-27 PROCEDURE — 85014 HEMATOCRIT: CPT

## 2025-01-27 PROCEDURE — 36415 COLL VENOUS BLD VENIPUNCTURE: CPT

## 2025-01-27 PROCEDURE — 82248 BILIRUBIN DIRECT: CPT

## 2025-01-27 PROCEDURE — 82040 ASSAY OF SERUM ALBUMIN: CPT

## 2025-01-27 PROCEDURE — 85041 AUTOMATED RBC COUNT: CPT

## 2025-01-27 PROCEDURE — 80197 ASSAY OF TACROLIMUS: CPT

## 2025-01-27 NOTE — TELEPHONE ENCOUNTER
DATE:  1/27/2025     TIME OF RECEIPT FROM LAB:  2:25    ORDERING PROVIDER: lake    LAB TEST:  k+    LAB VALUE:  6.1    RESULTS GIVEN WITH READ-BACK TO (PROVIDER):  Evy Pride    TIME LAB VALUE REPORTED TO PROVIDER:   2:25

## 2025-01-28 LAB
TACROLIMUS BLD-MCNC: 6.9 UG/L (ref 5–15)
TME LAST DOSE: NORMAL H
TME LAST DOSE: NORMAL H

## 2025-01-31 ENCOUNTER — LAB (OUTPATIENT)
Dept: LAB | Facility: CLINIC | Age: 30
End: 2025-01-31
Payer: MEDICARE

## 2025-01-31 DIAGNOSIS — Z94.4 LIVER REPLACED BY TRANSPLANT (H): ICD-10-CM

## 2025-01-31 LAB
ANION GAP SERPL CALCULATED.3IONS-SCNC: 7 MMOL/L (ref 7–15)
BUN SERPL-MCNC: 34.2 MG/DL (ref 6–20)
CALCIUM SERPL-MCNC: 10 MG/DL (ref 8.8–10.4)
CHLORIDE SERPL-SCNC: 102 MMOL/L (ref 98–107)
CREAT SERPL-MCNC: 1.53 MG/DL (ref 0.67–1.17)
EGFRCR SERPLBLD CKD-EPI 2021: 62 ML/MIN/1.73M2
GLUCOSE SERPL-MCNC: 162 MG/DL (ref 70–99)
HCO3 SERPL-SCNC: 25 MMOL/L (ref 22–29)
POTASSIUM SERPL-SCNC: 5.3 MMOL/L (ref 3.4–5.3)
SODIUM SERPL-SCNC: 134 MMOL/L (ref 135–145)

## 2025-01-31 PROCEDURE — 82374 ASSAY BLOOD CARBON DIOXIDE: CPT

## 2025-01-31 PROCEDURE — 80048 BASIC METABOLIC PNL TOTAL CA: CPT

## 2025-01-31 PROCEDURE — 36415 COLL VENOUS BLD VENIPUNCTURE: CPT

## 2025-01-31 PROCEDURE — 82435 ASSAY OF BLOOD CHLORIDE: CPT

## 2025-02-07 RX ORDER — LIDOCAINE 40 MG/G
CREAM TOPICAL
OUTPATIENT
Start: 2025-02-07

## 2025-02-12 DIAGNOSIS — Z94.4 LIVER REPLACED BY TRANSPLANT (H): ICD-10-CM

## 2025-02-12 RX ORDER — TACROLIMUS 0.5 MG/1
CAPSULE ORAL
Qty: 180 CAPSULE | Refills: 3 | OUTPATIENT
Start: 2025-02-12

## 2025-02-13 ENCOUNTER — ANESTHESIA (OUTPATIENT)
Dept: SURGERY | Facility: CLINIC | Age: 30
End: 2025-02-13

## 2025-02-13 ENCOUNTER — ANESTHESIA EVENT (OUTPATIENT)
Dept: SURGERY | Facility: CLINIC | Age: 30
End: 2025-02-13

## 2025-02-13 ENCOUNTER — PATIENT OUTREACH (OUTPATIENT)
Dept: GASTROENTEROLOGY | Facility: CLINIC | Age: 30
End: 2025-02-13
Payer: MEDICARE

## 2025-02-13 ENCOUNTER — TELEPHONE (OUTPATIENT)
Dept: TRANSPLANT | Facility: CLINIC | Age: 30
End: 2025-02-13
Payer: MEDICARE

## 2025-02-13 ENCOUNTER — PREP FOR PROCEDURE (OUTPATIENT)
Dept: GASTROENTEROLOGY | Facility: CLINIC | Age: 30
End: 2025-02-13
Payer: MEDICARE

## 2025-02-13 DIAGNOSIS — K83.1 BILIARY STRICTURE (H): Primary | ICD-10-CM

## 2025-02-13 DIAGNOSIS — Z94.4 LIVER REPLACED BY TRANSPLANT (H): ICD-10-CM

## 2025-02-13 RX ORDER — TACROLIMUS 0.5 MG/1
0.5 CAPSULE ORAL 2 TIMES DAILY
Qty: 180 CAPSULE | Refills: 3 | Status: SHIPPED | OUTPATIENT
Start: 2025-02-13

## 2025-02-13 ASSESSMENT — ENCOUNTER SYMPTOMS: NEW SYMPTOMS OF CORONARY ARTERY DISEASE: 0

## 2025-02-13 NOTE — TELEPHONE ENCOUNTER
Received a call from pharmacy tech regarding tacrolimus 0.5 mg prescription. It was refused recently stating that prescription was sent 12/3/24 but that was 1 mg capsules that were sent. Prescription sent to pharmacy.

## 2025-02-13 NOTE — PROGRESS NOTES
Patient had a procedure on his eye yesterday that resulted in a cancellation for ERCP today. Contacted his mother to reschedule ERCP.     Please assist in scheduling:     Procedure/Imaging/Clinic: ERCP (Endoscopic Retrograde Cholangiopancreatography)  Physician: Kyle  Timing: Next available  Scope time: Provider average   Anesthesia: General  Dx: Biliary stricture  Tier:3  Location: [facility]  OK to schedule while attending: Not specified by provider  Patient communication letter header:ERCP (Endoscopic Retrograde Cholangiopancreatography)    Agreeable to proceed with scheduling on 2/24/25.    Pre-op 2/10/25.     Med Review     Blood thinner -  None  ASA - None  Diabetic - Insulin   Injectable or oral medications for weight loss - None     Patient Education r/t procedure: Discussion/Mychart.    Marilu Levine, RN Care Coordinator

## 2025-02-13 NOTE — TELEPHONE ENCOUNTER
Patient Call: General  Route to LPN    Reason for call: Mother called to touch base about patient and have questions.     Call back needed? Yes    Return Call Needed  Same as documented in contacts section  When to return call?: Same day: Route High Priority

## 2025-02-13 NOTE — TELEPHONE ENCOUNTER
Call to Leticia. She was mainly calling to follow up on tacrolimus 0.5 mg prescription. Informed her it was all taken care of and new prescription has been sent.

## 2025-02-19 RX ORDER — MAGNESIUM OXIDE 400 MG/1
200 TABLET ORAL 2 TIMES DAILY
COMMUNITY
Start: 2023-05-16

## 2025-02-23 ENCOUNTER — ANESTHESIA EVENT (OUTPATIENT)
Dept: SURGERY | Facility: CLINIC | Age: 30
End: 2025-02-23
Payer: MEDICARE

## 2025-02-24 ENCOUNTER — APPOINTMENT (OUTPATIENT)
Dept: GENERAL RADIOLOGY | Facility: CLINIC | Age: 30
End: 2025-02-24
Attending: INTERNAL MEDICINE
Payer: MEDICARE

## 2025-02-24 ENCOUNTER — ANESTHESIA (OUTPATIENT)
Dept: SURGERY | Facility: CLINIC | Age: 30
End: 2025-02-24
Payer: MEDICARE

## 2025-02-24 ENCOUNTER — HOSPITAL ENCOUNTER (OUTPATIENT)
Facility: CLINIC | Age: 30
Discharge: HOME | End: 2025-02-24
Attending: INTERNAL MEDICINE | Admitting: INTERNAL MEDICINE
Payer: MEDICARE

## 2025-02-24 ENCOUNTER — TELEPHONE (OUTPATIENT)
Dept: GASTROENTEROLOGY | Facility: CLINIC | Age: 30
End: 2025-02-24

## 2025-02-24 VITALS
WEIGHT: 177.69 LBS | TEMPERATURE: 97.8 F | BODY MASS INDEX: 29.61 KG/M2 | HEART RATE: 67 BPM | SYSTOLIC BLOOD PRESSURE: 172 MMHG | DIASTOLIC BLOOD PRESSURE: 100 MMHG | RESPIRATION RATE: 12 BRPM | OXYGEN SATURATION: 100 % | HEIGHT: 65 IN

## 2025-02-24 LAB
ALBUMIN SERPL BCG-MCNC: 3.8 G/DL (ref 3.5–5.2)
ALP SERPL-CCNC: 310 U/L (ref 40–150)
ALT SERPL W P-5'-P-CCNC: 24 U/L (ref 0–70)
ANION GAP SERPL CALCULATED.3IONS-SCNC: 10 MMOL/L (ref 7–15)
AST SERPL W P-5'-P-CCNC: 26 U/L (ref 0–45)
BILIRUB SERPL-MCNC: 0.4 MG/DL
BUN SERPL-MCNC: 38.2 MG/DL (ref 6–20)
CALCIUM SERPL-MCNC: 9.4 MG/DL (ref 8.8–10.4)
CHLORIDE SERPL-SCNC: 107 MMOL/L (ref 98–107)
CREAT SERPL-MCNC: 1.36 MG/DL (ref 0.67–1.17)
EGFRCR SERPLBLD CKD-EPI 2021: 71 ML/MIN/1.73M2
ERCP: NORMAL
ERYTHROCYTE [DISTWIDTH] IN BLOOD BY AUTOMATED COUNT: 12.9 % (ref 10–15)
GLUCOSE BLDC GLUCOMTR-MCNC: 178 MG/DL (ref 70–99)
GLUCOSE BLDC GLUCOMTR-MCNC: 192 MG/DL (ref 70–99)
GLUCOSE SERPL-MCNC: 194 MG/DL (ref 70–99)
HCO3 SERPL-SCNC: 21 MMOL/L (ref 22–29)
HCT VFR BLD AUTO: 30.2 % (ref 40–53)
HGB BLD-MCNC: 10.2 G/DL (ref 13.3–17.7)
INR PPP: 1.03 (ref 0.85–1.15)
LIPASE SERPL-CCNC: 12 U/L (ref 13–60)
MCH RBC QN AUTO: 29.4 PG (ref 26.5–33)
MCHC RBC AUTO-ENTMCNC: 33.8 G/DL (ref 31.5–36.5)
MCV RBC AUTO: 87 FL (ref 78–100)
PLATELET # BLD AUTO: 146 10E3/UL (ref 150–450)
POTASSIUM SERPL-SCNC: 4.9 MMOL/L (ref 3.4–5.3)
PROT SERPL-MCNC: 6.5 G/DL (ref 6.4–8.3)
RBC # BLD AUTO: 3.47 10E6/UL (ref 4.4–5.9)
SODIUM SERPL-SCNC: 138 MMOL/L (ref 135–145)
WBC # BLD AUTO: 5.2 10E3/UL (ref 4–11)

## 2025-02-24 PROCEDURE — 82962 GLUCOSE BLOOD TEST: CPT

## 2025-02-24 PROCEDURE — 255N000002 HC RX 255 OP 636: Performed by: INTERNAL MEDICINE

## 2025-02-24 PROCEDURE — 370N000017 HC ANESTHESIA TECHNICAL FEE, PER MIN: Performed by: INTERNAL MEDICINE

## 2025-02-24 PROCEDURE — 250N000025 HC SEVOFLURANE, PER MIN: Performed by: INTERNAL MEDICINE

## 2025-02-24 PROCEDURE — 999N000141 HC STATISTIC PRE-PROCEDURE NURSING ASSESSMENT: Performed by: INTERNAL MEDICINE

## 2025-02-24 PROCEDURE — 250N000011 HC RX IP 250 OP 636: Performed by: ANESTHESIOLOGY

## 2025-02-24 PROCEDURE — C1726 CATH, BAL DIL, NON-VASCULAR: HCPCS | Performed by: INTERNAL MEDICINE

## 2025-02-24 PROCEDURE — 258N000003 HC RX IP 258 OP 636: Performed by: ANESTHESIOLOGY

## 2025-02-24 PROCEDURE — 83690 ASSAY OF LIPASE: CPT | Performed by: INTERNAL MEDICINE

## 2025-02-24 PROCEDURE — 80053 COMPREHEN METABOLIC PANEL: CPT | Performed by: INTERNAL MEDICINE

## 2025-02-24 PROCEDURE — 250N000009 HC RX 250: Performed by: ANESTHESIOLOGY

## 2025-02-24 PROCEDURE — 85018 HEMOGLOBIN: CPT | Performed by: INTERNAL MEDICINE

## 2025-02-24 PROCEDURE — 710N000011 HC RECOVERY PHASE 1, LEVEL 3, PER MIN: Performed by: INTERNAL MEDICINE

## 2025-02-24 PROCEDURE — 360N000082 HC SURGERY LEVEL 2 W/ FLUORO, PER MIN: Performed by: INTERNAL MEDICINE

## 2025-02-24 PROCEDURE — 272N000001 HC OR GENERAL SUPPLY STERILE: Performed by: INTERNAL MEDICINE

## 2025-02-24 PROCEDURE — 85610 PROTHROMBIN TIME: CPT | Performed by: INTERNAL MEDICINE

## 2025-02-24 PROCEDURE — 36415 COLL VENOUS BLD VENIPUNCTURE: CPT | Performed by: INTERNAL MEDICINE

## 2025-02-24 PROCEDURE — C2617 STENT, NON-COR, TEM W/O DEL: HCPCS | Performed by: INTERNAL MEDICINE

## 2025-02-24 PROCEDURE — C1769 GUIDE WIRE: HCPCS | Performed by: INTERNAL MEDICINE

## 2025-02-24 PROCEDURE — 999N000181 XR SURGERY CARM FLUORO GREATER THAN 5 MIN W STILLS

## 2025-02-24 PROCEDURE — 710N000012 HC RECOVERY PHASE 2, PER MINUTE: Performed by: INTERNAL MEDICINE

## 2025-02-24 DEVICE — IMPLANTABLE DEVICE: Type: IMPLANTABLE DEVICE | Site: BILE DUCT | Status: FUNCTIONAL

## 2025-02-24 RX ORDER — FENTANYL CITRATE 50 UG/ML
INJECTION, SOLUTION INTRAMUSCULAR; INTRAVENOUS PRN
Status: DISCONTINUED | OUTPATIENT
Start: 2025-02-24 | End: 2025-02-24

## 2025-02-24 RX ORDER — ONDANSETRON 2 MG/ML
4 INJECTION INTRAMUSCULAR; INTRAVENOUS EVERY 6 HOURS PRN
Status: DISCONTINUED | OUTPATIENT
Start: 2025-02-24 | End: 2025-02-24 | Stop reason: HOSPADM

## 2025-02-24 RX ORDER — SODIUM CHLORIDE, SODIUM LACTATE, POTASSIUM CHLORIDE, CALCIUM CHLORIDE 600; 310; 30; 20 MG/100ML; MG/100ML; MG/100ML; MG/100ML
INJECTION, SOLUTION INTRAVENOUS CONTINUOUS PRN
Status: DISCONTINUED | OUTPATIENT
Start: 2025-02-24 | End: 2025-02-24

## 2025-02-24 RX ORDER — LIDOCAINE 40 MG/G
CREAM TOPICAL
Status: DISCONTINUED | OUTPATIENT
Start: 2025-02-24 | End: 2025-02-24 | Stop reason: HOSPADM

## 2025-02-24 RX ORDER — ONDANSETRON 4 MG/1
4 TABLET, ORALLY DISINTEGRATING ORAL EVERY 6 HOURS PRN
Status: DISCONTINUED | OUTPATIENT
Start: 2025-02-24 | End: 2025-02-24 | Stop reason: HOSPADM

## 2025-02-24 RX ORDER — ONDANSETRON 4 MG/1
4 TABLET, ORALLY DISINTEGRATING ORAL EVERY 30 MIN PRN
Status: DISCONTINUED | OUTPATIENT
Start: 2025-02-24 | End: 2025-02-24 | Stop reason: HOSPADM

## 2025-02-24 RX ORDER — SODIUM CHLORIDE, SODIUM LACTATE, POTASSIUM CHLORIDE, CALCIUM CHLORIDE 600; 310; 30; 20 MG/100ML; MG/100ML; MG/100ML; MG/100ML
INJECTION, SOLUTION INTRAVENOUS CONTINUOUS
Status: DISCONTINUED | OUTPATIENT
Start: 2025-02-24 | End: 2025-02-24 | Stop reason: HOSPADM

## 2025-02-24 RX ORDER — HALOPERIDOL 5 MG/ML
1 INJECTION INTRAMUSCULAR ONCE
Status: COMPLETED | OUTPATIENT
Start: 2025-02-24 | End: 2025-02-24

## 2025-02-24 RX ORDER — PROCHLORPERAZINE MALEATE 5 MG/1
10 TABLET ORAL EVERY 6 HOURS PRN
Status: DISCONTINUED | OUTPATIENT
Start: 2025-02-24 | End: 2025-02-24 | Stop reason: HOSPADM

## 2025-02-24 RX ORDER — NALOXONE HYDROCHLORIDE 0.4 MG/ML
0.1 INJECTION, SOLUTION INTRAMUSCULAR; INTRAVENOUS; SUBCUTANEOUS
Status: DISCONTINUED | OUTPATIENT
Start: 2025-02-24 | End: 2025-02-24 | Stop reason: HOSPADM

## 2025-02-24 RX ORDER — DEXAMETHASONE SODIUM PHOSPHATE 4 MG/ML
INJECTION, SOLUTION INTRA-ARTICULAR; INTRALESIONAL; INTRAMUSCULAR; INTRAVENOUS; SOFT TISSUE PRN
Status: DISCONTINUED | OUTPATIENT
Start: 2025-02-24 | End: 2025-02-24

## 2025-02-24 RX ORDER — DEXAMETHASONE SODIUM PHOSPHATE 4 MG/ML
4 INJECTION, SOLUTION INTRA-ARTICULAR; INTRALESIONAL; INTRAMUSCULAR; INTRAVENOUS; SOFT TISSUE
Status: DISCONTINUED | OUTPATIENT
Start: 2025-02-24 | End: 2025-02-24 | Stop reason: HOSPADM

## 2025-02-24 RX ORDER — FLUMAZENIL 0.1 MG/ML
0.2 INJECTION, SOLUTION INTRAVENOUS
Status: DISCONTINUED | OUTPATIENT
Start: 2025-02-24 | End: 2025-02-24 | Stop reason: HOSPADM

## 2025-02-24 RX ORDER — NALOXONE HYDROCHLORIDE 0.4 MG/ML
0.4 INJECTION, SOLUTION INTRAMUSCULAR; INTRAVENOUS; SUBCUTANEOUS
Status: DISCONTINUED | OUTPATIENT
Start: 2025-02-24 | End: 2025-02-24 | Stop reason: HOSPADM

## 2025-02-24 RX ORDER — NALOXONE HYDROCHLORIDE 0.4 MG/ML
0.2 INJECTION, SOLUTION INTRAMUSCULAR; INTRAVENOUS; SUBCUTANEOUS
Status: DISCONTINUED | OUTPATIENT
Start: 2025-02-24 | End: 2025-02-24 | Stop reason: HOSPADM

## 2025-02-24 RX ORDER — IOPAMIDOL 510 MG/ML
INJECTION, SOLUTION INTRAVASCULAR PRN
Status: DISCONTINUED | OUTPATIENT
Start: 2025-02-24 | End: 2025-02-24 | Stop reason: HOSPADM

## 2025-02-24 RX ORDER — PROPOFOL 10 MG/ML
INJECTION, EMULSION INTRAVENOUS PRN
Status: DISCONTINUED | OUTPATIENT
Start: 2025-02-24 | End: 2025-02-24

## 2025-02-24 RX ORDER — OXYCODONE HYDROCHLORIDE 10 MG/1
10 TABLET ORAL
Status: DISCONTINUED | OUTPATIENT
Start: 2025-02-24 | End: 2025-02-24 | Stop reason: HOSPADM

## 2025-02-24 RX ORDER — OXYCODONE HYDROCHLORIDE 5 MG/1
5 TABLET ORAL
Status: DISCONTINUED | OUTPATIENT
Start: 2025-02-24 | End: 2025-02-24 | Stop reason: HOSPADM

## 2025-02-24 RX ORDER — ONDANSETRON 2 MG/ML
INJECTION INTRAMUSCULAR; INTRAVENOUS PRN
Status: DISCONTINUED | OUTPATIENT
Start: 2025-02-24 | End: 2025-02-24

## 2025-02-24 RX ORDER — FENTANYL CITRATE 50 UG/ML
50 INJECTION, SOLUTION INTRAMUSCULAR; INTRAVENOUS EVERY 5 MIN PRN
Status: DISCONTINUED | OUTPATIENT
Start: 2025-02-24 | End: 2025-02-24 | Stop reason: HOSPADM

## 2025-02-24 RX ORDER — LIDOCAINE HYDROCHLORIDE 20 MG/ML
INJECTION, SOLUTION INFILTRATION; PERINEURAL PRN
Status: DISCONTINUED | OUTPATIENT
Start: 2025-02-24 | End: 2025-02-24

## 2025-02-24 RX ORDER — CIPROFLOXACIN 2 MG/ML
INJECTION, SOLUTION INTRAVENOUS PRN
Status: DISCONTINUED | OUTPATIENT
Start: 2025-02-24 | End: 2025-02-24

## 2025-02-24 RX ORDER — ONDANSETRON 2 MG/ML
4 INJECTION INTRAMUSCULAR; INTRAVENOUS EVERY 30 MIN PRN
Status: DISCONTINUED | OUTPATIENT
Start: 2025-02-24 | End: 2025-02-24 | Stop reason: HOSPADM

## 2025-02-24 RX ORDER — HYDROMORPHONE HCL IN WATER/PF 6 MG/30 ML
0.4 PATIENT CONTROLLED ANALGESIA SYRINGE INTRAVENOUS EVERY 5 MIN PRN
Status: DISCONTINUED | OUTPATIENT
Start: 2025-02-24 | End: 2025-02-24 | Stop reason: HOSPADM

## 2025-02-24 RX ORDER — FENTANYL CITRATE 50 UG/ML
25 INJECTION, SOLUTION INTRAMUSCULAR; INTRAVENOUS EVERY 5 MIN PRN
Status: DISCONTINUED | OUTPATIENT
Start: 2025-02-24 | End: 2025-02-24 | Stop reason: HOSPADM

## 2025-02-24 RX ORDER — HYDROMORPHONE HCL IN WATER/PF 6 MG/30 ML
0.2 PATIENT CONTROLLED ANALGESIA SYRINGE INTRAVENOUS EVERY 5 MIN PRN
Status: DISCONTINUED | OUTPATIENT
Start: 2025-02-24 | End: 2025-02-24 | Stop reason: HOSPADM

## 2025-02-24 RX ADMIN — HYDROMORPHONE HYDROCHLORIDE 0.2 MG: 0.2 INJECTION, SOLUTION INTRAMUSCULAR; INTRAVENOUS; SUBCUTANEOUS at 09:35

## 2025-02-24 RX ADMIN — DEXAMETHASONE SODIUM PHOSPHATE 8 MG: 4 INJECTION, SOLUTION INTRA-ARTICULAR; INTRALESIONAL; INTRAMUSCULAR; INTRAVENOUS; SOFT TISSUE at 07:51

## 2025-02-24 RX ADMIN — LIDOCAINE HYDROCHLORIDE 100 MG: 20 INJECTION, SOLUTION INFILTRATION; PERINEURAL at 07:38

## 2025-02-24 RX ADMIN — HYDROMORPHONE HYDROCHLORIDE 0.4 MG: 0.2 INJECTION, SOLUTION INTRAMUSCULAR; INTRAVENOUS; SUBCUTANEOUS at 09:55

## 2025-02-24 RX ADMIN — FENTANYL CITRATE 100 MCG: 50 INJECTION INTRAMUSCULAR; INTRAVENOUS at 07:38

## 2025-02-24 RX ADMIN — FENTANYL CITRATE 50 MCG: 50 INJECTION, SOLUTION INTRAMUSCULAR; INTRAVENOUS at 09:20

## 2025-02-24 RX ADMIN — Medication 50 MG: at 07:38

## 2025-02-24 RX ADMIN — ONDANSETRON 4 MG: 2 INJECTION, SOLUTION INTRAMUSCULAR; INTRAVENOUS at 09:19

## 2025-02-24 RX ADMIN — PROPOFOL 170 MG: 10 INJECTION, EMULSION INTRAVENOUS at 07:38

## 2025-02-24 RX ADMIN — PHENYLEPHRINE HYDROCHLORIDE 200 MCG: 10 INJECTION INTRAVENOUS at 07:54

## 2025-02-24 RX ADMIN — MIDAZOLAM 2 MG: 1 INJECTION INTRAMUSCULAR; INTRAVENOUS at 07:28

## 2025-02-24 RX ADMIN — HALOPERIDOL LACTATE 1 MG: 5 INJECTION, SOLUTION INTRAMUSCULAR at 10:05

## 2025-02-24 RX ADMIN — PROPOFOL 20 MG: 10 INJECTION, EMULSION INTRAVENOUS at 08:25

## 2025-02-24 RX ADMIN — ONDANSETRON 4 MG: 2 INJECTION INTRAMUSCULAR; INTRAVENOUS at 08:43

## 2025-02-24 RX ADMIN — PHENYLEPHRINE HYDROCHLORIDE 100 MCG: 10 INJECTION INTRAVENOUS at 07:51

## 2025-02-24 RX ADMIN — HYDROMORPHONE HYDROCHLORIDE 0.4 MG: 0.2 INJECTION, SOLUTION INTRAMUSCULAR; INTRAVENOUS; SUBCUTANEOUS at 10:04

## 2025-02-24 RX ADMIN — HYDROMORPHONE HYDROCHLORIDE 0.4 MG: 0.2 INJECTION, SOLUTION INTRAMUSCULAR; INTRAVENOUS; SUBCUTANEOUS at 09:42

## 2025-02-24 RX ADMIN — PROPOFOL 50 MG: 10 INJECTION, EMULSION INTRAVENOUS at 08:33

## 2025-02-24 RX ADMIN — FENTANYL CITRATE 50 MCG: 50 INJECTION, SOLUTION INTRAMUSCULAR; INTRAVENOUS at 09:27

## 2025-02-24 RX ADMIN — CIPROFLOXACIN 400 MG: 400 INJECTION, SOLUTION INTRAVENOUS at 07:51

## 2025-02-24 RX ADMIN — Medication 100 MG: at 08:25

## 2025-02-24 RX ADMIN — SODIUM CHLORIDE, POTASSIUM CHLORIDE, SODIUM LACTATE AND CALCIUM CHLORIDE: 600; 310; 30; 20 INJECTION, SOLUTION INTRAVENOUS at 07:34

## 2025-02-24 RX ADMIN — PROCHLORPERAZINE EDISYLATE 5 MG: 5 INJECTION INTRAMUSCULAR; INTRAVENOUS at 09:29

## 2025-02-24 ASSESSMENT — ACTIVITIES OF DAILY LIVING (ADL)
ADLS_ACUITY_SCORE: 46
ADLS_ACUITY_SCORE: 46
ADLS_ACUITY_SCORE: 47
ADLS_ACUITY_SCORE: 46
ADLS_ACUITY_SCORE: 47

## 2025-02-24 NOTE — PROGRESS NOTES
Pre-procedure note:    29 year old white male with a history of alcoholic cirrhosis status post  donor duct to duct liver transplant in 2023 that was complicated by biliary duct anastomotic stenosis s/p ERCP 23 with biliary sphincterotomy, dilation of the stricture, and placement of a 10Fr by 9cm Sofflex stent. ERCP was on  with placement of two 10 Fr stents. ERCP on 23 significant improvement of anastomotic stricture and placement of 7 Fr x 7 cm fallout stent.      Ref: RAFIQ SAM

## 2025-02-24 NOTE — PROVIDER NOTIFICATION
02/24/25 1004   Vitals   Oximeter Heart Rate 67 bpm   Pulse Rate Source Monitor   Pulse 68   BP (!) 195/100   BP - Mean 128   Patient Position Lying   Resp 15   Pain/Comfort/Sleep   Patient Currently in Pain yes   Preferred Pain Scale DVPRS (Group)   DVPRS Pain Rating Score   (DVPRS) Pain Rating Score  7-Focus of attention, prevents doing daily activities     Patient c/o nausea and pain, see MAR for interventions. MD Simms notified of BP. No new orders, haldol given for nausea. Pain better managed. Will continue to monitor    Patient's BP improved to 166/96, pain and nausea significantly improved. Patient resting comfortably in bed, mom bedside. Spoke with Dr. Simms to confirm okay to proceed to discharge. Will continue to monitor.

## 2025-02-24 NOTE — ANESTHESIA POSTPROCEDURE EVALUATION
Patient: Dillon Salter    Procedure: Procedure(s):  ENDOSCOPIC RETROGRADE CHOLANGIOPANCREATOGRAPHY with biliary sludge removal, dilation and stent placement       Anesthesia Type:  General    Note:  Disposition: Outpatient   Postop Pain Control: Uneventful            Sign Out: Well controlled pain   PONV: No   Neuro/Psych: Uneventful            Sign Out: Acceptable/Baseline neuro status   Airway/Respiratory: Uneventful            Sign Out: Acceptable/Baseline resp. status   CV/Hemodynamics:             Sign Out: Detailed CV status               Blood Pressure: HypERtension               Rate/Rhythm: Normal HR               Perfusion:  Adequate perfusion indices   Other NRE: NONE   DID A NON-ROUTINE EVENT OCCUR? No           Last vitals:  Vitals Value Taken Time   /96 02/24/25 1053   Temp 36.7  C (98  F) 02/24/25 1050   Pulse 70 02/24/25 1058   Resp 17 02/24/25 1058   SpO2 99 % 02/24/25 1058   Vitals shown include unfiled device data.    Electronically Signed By: Octavio Ward MD  February 24, 2025  10:58 AM

## 2025-02-24 NOTE — ANESTHESIA PROCEDURE NOTES
Airway       Patient location during procedure: OR       Procedure Start/Stop Times: 2/24/2025 7:41 AM  Staff -        Anesthesiologist:  Jane Simms MD       CRNA: Power Welch APRN CRNA       Performed By: CRNA  Consent for Airway        Urgency: elective  Indications and Patient Condition       Indications for airway management: jalen-procedural       Induction type:intravenous       Mask difficulty assessment: 2 - vent by mask + OA or adjuvant +/- NMBA    Final Airway Details       Final airway type: endotracheal airway       Successful airway: ETT - single and Oral  Endotracheal Airway Details        ETT size (mm): 7.5       Cuffed: yes       Successful intubation technique: direct laryngoscopy       DL Blade Type: MAC 3       Grade View of Cords: 1       Adjucts: bougie and stylet       Position: Right       Measured from: gums/teeth       Secured at (cm): 22       Bite block used: None    Post intubation assessment        Placement verified by: capnometry, equal breath sounds and chest rise        Number of attempts at approach: 1       Number of other approaches attempted: 0       Secured with: tape       Ease of procedure: easy       Dentition: Unchanged (unchanged from prior condition)    Medication(s) Administered   Medication Administration Time: 2/24/2025 7:41 AM

## 2025-02-24 NOTE — DISCHARGE INSTRUCTIONS
Observe patient in PACU prior anticipated home discharge   - Clear liquid diet   - Advance diet as tolerated   - Repeat ERCP in 4 weeks for stent exchange and up-sizing of biliary stent   - If patient develops fever, chills, or worsening abdominal pain or pancreatitis before scheduled ERCP, will recommend patient to call us immediately for consideration of an earlier urgent ERCP   - The findings and recommendations were discussed with the patient.    Contacting your Doctor -   To contact a doctor, call Dr. Guru Wright @ [phone] (GI Clinic) or [phone]  or:  [phone] and ask for the resident on call for Gastroenterology (answered 24 hours a day)   Emergency Department:  [place]: [phone]  [place] [facility]: [phone] if you are in need of immediate or emergent help

## 2025-02-24 NOTE — ANESTHESIA PREPROCEDURE EVALUATION
Anesthesia Pre-Procedure Evaluation    Patient: Dillon Salter   MRN: [id number] : 1995        Procedure : Procedure(s):  ENDOSCOPIC RETROGRADE CHOLANGIOPANCREATOGRAPHY          Past Medical History:   Diagnosis Date    Acute on chronic anemia 2022    Alcohol use disorder     Alcohol use disorder, severe, dependence (H) 2022    Alcoholic cirrhosis of liver with ascites (H)     Alcoholic hepatitis (H)     Autism spectrum disorder 2022    High functioning autistic.  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  lives with parents (Leticia and Wes) socially isolated drinks alcohol beer daily     Benign essential hypertension     Bleeding esophageal varices (H) 2022    Hepatic encephalopathy (H)     Hyponatremia 2022    Major depressive disorder     Neuropathic pain     Status post liver transplantation (H) 2023    Type II Diabetes       Past Surgical History:   Procedure Laterality Date    BENCH LIVER  2023    Procedure: Bench liver;  Surgeon: Thelma Sterling MD;  [facility]    COLONOSCOPY N/A 2023    Procedure: Colonoscopy;  Surgeon: Osman Montoya MD;  [facility]    ENDOSCOPIC RETROGRADE CHOLANGIOPANCREATOGRAM N/A 2023    Procedure: Endoscopic retrograde cholangiopancreatogram. Biliary sphincterotomy, dilation, abnd stent placement;  Surgeon: Jarrell Mullins MD;  [facility]    ENDOSCOPIC RETROGRADE CHOLANGIOPANCREATOGRAPHY, EXCHANGE TUBE/STENT N/A 2023    Procedure: ENDOSCOPIC RETROGRADE CHOLANGIOPANCREATOGRAPHY, WITH REPLACEMENT OF STENT, stone and sludge removal;  Surgeon: Guru Adeline Wrigth MD;  [facility]    ENDOSCOPIC RETROGRADE CHOLANGIOPANCREATOGRAPHY, EXCHANGE TUBE/STENT N/A 2023    Procedure: ENDOSCOPIC RETROGRADE CHOLANGIOPANCREATOGRAPHY, WITH REPLACEMENT OF TUBE OR  STENT, sludge removal, and biliary duct dilation.;  Surgeon: Guru Adeline Wright MD;  [facility]    ENDOSCOPIC ULTRASOUND LOWER GASTROINTESTIONAL TRACT (GI) N/A 2023    Procedure: ULTRASOUND, LOWER GI TRACT, ENDOSCOPIC with glueing;  Surgeon: Osman Montoya MD;  [facility]    ESOPHAGOSCOPY, GASTROSCOPY, DUODENOSCOPY (EGD), COMBINED N/A 2022    Procedure: ESOPHAGOGASTRODUODENOSCOPY (EGD) with esophageal banding;  Surgeon: Gary Hurst MD;  Location: [facility]    ESOPHAGOSCOPY, GASTROSCOPY, DUODENOSCOPY (EGD), COMBINED N/A 2022    Procedure: ESOPHAGOGASTRODUODENOSCOPY;  Surgeon: Gavino Reza MD;  Location: [facility]    ESOPHAGOSCOPY, GASTROSCOPY, DUODENOSCOPY (EGD), COMBINED N/A 2023    Procedure: ESOPHAGOGASTRODUODENOSCOPY (EGD) with esophageal variceal banding;  Surgeon: Juan Boo MD;  Location[facility]    ESOPHAGOSCOPY, GASTROSCOPY, DUODENOSCOPY (EGD), COMBINED N/A 2023    Procedure: ESOPHAGOGASTRODUODENOSCOPY (EGD);  Surgeon: Juan Boo MD;  Location: [facility]    ESOPHAGOSCOPY, GASTROSCOPY, DUODENOSCOPY (EGD), COMBINED N/A 2023    Procedure: Esophagoscopy, gastroscopy, duodenoscopy (EGD), combined;  Surgeon: Osman Montoya MD;  [facility]    ESOPHAGOSCOPY, GASTROSCOPY, DUODENOSCOPY (EGD), COMBINED N/A 2023    Procedure: ESOPHAGOGASTRODUODENOSCOPY (EGD);  Surgeon: Leventhal, Thomas Michael, MD;  [facility]    IR CVC NON TUNNEL LINE REMOVAL  3/10/2023    IR CVC TUNNEL PLACEMENT > 5 YRS OF AGE  3/10/2023    IR CVC TUNNEL REMOVAL RIGHT  2023    IR LIVER BIOPSY PERCUTANEOUS  2023    MIDLINE DOUBLE LUMEN PLACEMENT  2022         PICC TRIPLE LUMEN PLACEMENT  2022         RETURN LIVER TRANSPLANT N/A 3/1/2023    Procedure: RETURN TO OPERATING ROOM, AFTER LIVER TRANSPLANT;  Surgeon: Thelma Sterling MD;  [facility]    TRANSPLANT LIVER RECIPIENT   DONOR N/A 2023    Procedure: Transplant liver recipient  donor;  Surgeon: Thelma Sterling MD;  [facility]      No Known Allergies   Social History     Tobacco Use    Smoking status: Former     Types: Cigarettes, Vaping Device     Passive exposure: Past    Smokeless tobacco: Never    Tobacco comments:     A pack lasted a year, hardly smoked   Substance Use Topics    Alcohol use: Not Currently     Comment: No ETOH since 20      Wt Readings from Last 1 Encounters:   25 80.6 kg (177 lb 11.1 oz)        Anesthesia Evaluation            ROS/MED HX  ENT/Pulmonary:       Neurologic: Comment: Autism      Cardiovascular:     (+)  hypertension- -   -  - -                                      METS/Exercise Tolerance:     Hematologic:       Musculoskeletal:       GI/Hepatic: Comment: S/p live transplant for cirrhosis    (+)             liver disease,       Renal/Genitourinary:     (+) renal disease,    Pt has history of transplant, date: two years ago,        Endo:     (+)  type II DM,                    Psychiatric/Substance Use: Comment: ETOH use in the past. Sober now    (+) psychiatric history depression alcohol abuse      Infectious Disease:       Malignancy:       Other:            Physical Exam    Airway        Mallampati: II   TM distance: > 3 FB   Neck ROM: full   Mouth opening: > 3 cm    Respiratory Devices and Support         Dental     Comment: Top incisors crowns. Multiple missing teeth    (+) Modest Abnormalities - crowns, retainers, 1 or 2 missing teeth      Cardiovascular             Pulmonary                   OUTSIDE LABS:  CBC:   Lab Results   Component Value Date    WBC 5.2 2025    WBC 6.6 2025    HGB 10.2 (L) 2025    HGB 11.3 (L) 2025    HCT 30.2 (L) 2025    HCT 33.9 (L) 2025     (L) 2025     2025     BMP:   Lab Results   Component Value Date     2025     (L) 2025    POTASSIUM 4.9 2025     "POTASSIUM 5.3 01/31/2025    CHLORIDE 107 02/24/2025    CHLORIDE 102 01/31/2025    CO2 21 (L) 02/24/2025    CO2 25 01/31/2025    BUN 38.2 (H) 02/24/2025    BUN 34.2 (H) 01/31/2025    CR 1.36 (H) 02/24/2025    CR 1.53 (H) 01/31/2025     (H) 02/24/2025     (H) 02/24/2025     COAGS:   Lab Results   Component Value Date    PTT 30 03/07/2023    INR 1.03 02/24/2025    FIBR 375 03/07/2023     POC: No results found for: \"BGM\", \"HCG\", \"HCGS\"  HEPATIC:   Lab Results   Component Value Date    ALBUMIN 3.8 02/24/2025    PROTTOTAL 6.5 02/24/2025    ALT 24 02/24/2025    AST 26 02/24/2025    ALKPHOS 310 (H) 02/24/2025    BILITOTAL 0.4 02/24/2025    DESI 11 (L) 03/05/2023     OTHER:   Lab Results   Component Value Date    PH 7.32 (L) 03/04/2023    LACT 0.7 03/06/2023    A1C 5.2 03/12/2023    SARTHAK 9.4 02/24/2025    PHOS 4.0 05/15/2024    MAG 1.8 05/15/2024    LIPASE 12 (L) 02/24/2025    AMYLASE 18 (L) 07/24/2023    TSH 1.09 07/30/2022       Anesthesia Plan    ASA Status:  3    NPO Status:  NPO Appropriate    Anesthesia Type: General.     - Airway: ETT   Induction: Intravenous.   Maintenance: Inhalation.        Consents    Anesthesia Plan(s) and associated risks, benefits, and realistic alternatives discussed. Questions answered and patient/representative(s) expressed understanding.     - Discussed:     - Discussed with:  Patient      - Extended Intubation/Ventilatory Support Discussed: No.      - Patient is DNR/DNI Status: No     Use of blood products discussed: No .     Postoperative Care    Pain management: Multi-modal analgesia.   PONV prophylaxis: Ondansetron (or other 5HT-3), Dexamethasone or Solumedrol     Comments:               Jane Simms MD    I have reviewed the pertinent notes and labs in the chart from the past 30 days and (re)examined the patient.  Any updates or changes from those notes are reflected in this note.    Clinically Significant Risk Factors Present on Admission                   # " "Hypertension: Noted on problem list      # Anemia: based on hgb <11       # Overweight: Estimated body mass index is 29.57 kg/m  as calculated from the following:    Height as of this encounter: 1.651 m (5' 5\").    Weight as of this encounter: 80.6 kg (177 lb 11.1 oz).                "

## 2025-02-24 NOTE — ANESTHESIA CARE TRANSFER NOTE
Patient: Dillon Salter    Procedure: Procedure(s):  ENDOSCOPIC RETROGRADE CHOLANGIOPANCREATOGRAPHY with biliary sludge removal, dilation and stent placement       Diagnosis: Biliary stricture (H) [K83.1]  Diagnosis Additional Information: No value filed.    Anesthesia Type:   General     Note:    Oropharynx: oropharynx clear of all foreign objects and spontaneously breathing  Level of Consciousness: awake  Oxygen Supplementation: face mask  Level of Supplemental Oxygen (L/min / FiO2): 6  Independent Airway: airway patency satisfactory and stable  Dentition: dentition unchanged  Vital Signs Stable: post-procedure vital signs reviewed and stable  Report to RN Given: handoff report given  Patient transferred to: PACU    Handoff Report: Identifed the Patient, Identified the Reponsible Provider, Reviewed the pertinent medical history, Discussed the surgical course, Reviewed Intra-OP anesthesia mangement and issues during anesthesia, Set expectations for post-procedure period and Allowed opportunity for questions and acknowledgement of understanding      Vitals:  Vitals Value Taken Time   /93 02/24/25 0900   Temp 36.2    Pulse 72 02/24/25 0902   Resp 16    SpO2 100 % 02/24/25 0902   Vitals shown include unfiled device data.    Electronically Signed By: VAMSHI KWON CRNA  February 24, 2025  9:03 AM

## 2025-02-24 NOTE — BRIEF OP NOTE
[facility]    Brief Operative Note    Pre-operative diagnosis: Biliary stricture (H) [K83.1]  Post-operative diagnosis Same as pre-operative diagnosis    Procedure: ENDOSCOPIC RETROGRADE CHOLANGIOPANCREATOGRAPHY with biliary sludge removal, dilation and stent placement, N/A - Mouth    Surgeon: Surgeons and Role:     * Guru Adeline Wright MD - Primary     * Kvng Garces MD - Fellow - Assisting  Anesthesia: General   Estimated Blood Loss: None    Drains: None  Specimens: * No specimens in log *    Findings:     - Selective biliary cannulation   - Evidence of severe anastomotic stricture on cholangiogram   - Successful dilation of anastomotic stricture to 6 mm and balloon extraction of debris/sludge   - Placement of 10 Fr x 9 cm Sof-Flex plastic biliary stent    Recommendations:   - Observe patient in PACU prior anticipated home discharge   - Clear liquid diet   - Advance diet as tolerated   - Repeat ERCP in 4 weeks for stent exchange and up-sizing of biliary stent   - If patient develops fever, chills, or worsening abdominal pain or pancreatitis before scheduled ERCP, will recommend patient to call us immediately for consideration of an earlier urgent ERCP   - The findings and recommendations were discussed with the patient.      Complications: None.  Implants:   Implant Name Type Inv. Item Serial No.  Lot No. LRB No. Used Action   STENT BILIARY WALLFLEX COVERED [id number] - [id number] Stent STENT BILIARY WALLFLEX COVERED [id number]  [employer] [id number] N/A 1 Wasted   STENT COTTON-FREED ([place]) DAVIDA SOF-FLEX [id number] - [id number] Stent STENT COTTON-FREED ([place]) DAVIDA SOF-FLEX [id number]  [facility] [id number] N/A 1 Implanted   STENT BILIARY WALLFLEX COVERED [id number] - [id number] Stent STENT BILIARY WALLFLEX COVERED [id number]  [employer] [id number] N/A 1 Wasted

## 2025-02-25 NOTE — TELEPHONE ENCOUNTER
Received a call from patient's mother that he has been hyperglycemic up to 350 since his procedure.  She is questioning if this could be a complication of the procedure.  He is otherwise feeling well.  Discussed that he could be having some stress reaction after the procedure which could cause some hyperglycemia but I would not manage it any differently than they typically manage his hyperglycemia.    Ryan Agustin  Gastroenterology Fellow, PGY4

## 2025-02-26 ENCOUNTER — PREP FOR PROCEDURE (OUTPATIENT)
Dept: GASTROENTEROLOGY | Facility: CLINIC | Age: 30
End: 2025-02-26
Payer: MEDICARE

## 2025-02-26 ENCOUNTER — TELEPHONE (OUTPATIENT)
Dept: GASTROENTEROLOGY | Facility: CLINIC | Age: 30
End: 2025-02-26
Payer: MEDICARE

## 2025-02-26 DIAGNOSIS — Z46.89 ENCOUNTER FOR REPLACEMENT OF BILIARY STENT: Primary | ICD-10-CM

## 2025-02-26 NOTE — TELEPHONE ENCOUNTER
Post ERCP on 25 with Dr. Wright.      Follow-up recommendations:    - Clear liquid diet.                          - Advance diet as tolerated                          - Repeat ERCP in 4 weeks for stent exchange and                          up-sizing of biliary stent                          - If patient develops fever, chills, or worsening                          abdominal pain or pancreatitis before scheduled ERCP,                          will recommend patient to call us immediately for                          consideration of an earlier urgent ERCP     Patient states: Returned call to patient's mother. Denies pain. Poor appetite. She notes primary concern with fluctuating blood sugar readings. Blood glucose range  since procedure. Discussed management with consistent intake, small meals and small adjustments. Patient does use a sensor and family is checking frequently. Advised they discuss with PCP for correction coverage if patient is not eating well. Leticia articulated an understanding. Patient is stooling. Discussed adequate hydration.     Orders placed:   Please assist in scheduling:     Procedure/Imaging/Clinic: Endoscopic Retrograde Cholangiopancreatography (ERCP) with stent exchange  Physician: Kyle  Timin weeks  Scope time: Provider average   Anesthesia: General  Dx: Encounter for exchange of biliary stent  Tier:3  Location: [facility]  OK to schedule while attending: Not specified by provider   Patient communication letter header:Endoscopic Retrograde Cholangiopancreatography (ERCP)    Procedure date offered: 3/24/25; agreeable to proceed. Leticia requests first case if possible.     Preop Plan: Required within 30 days of procedure. Will arrange with PCP.    Med Review     Blood thinner -  None  ASA - None  Diabetic - Insulin   Injectable or oral medications for weight loss - None    Patient Education r/t procedure: Mika    Reviewed post procedure recommendations and discussed  symptoms to report to our clinic. Patient articulated understanding.     Clinic contact and scheduling numbers verified for future questions/concerns.     Marilu Levine RN Care Coordinator

## 2025-02-26 NOTE — TELEPHONE ENCOUNTER
[facility] Call Center    Phone Message    May a detailed message be left on voicemail: yes     Reason for Call: Other: Leticia is reporting that the patients blood sugar since the procedure is tanking and getting really high without any reason. He has no appetite. Please call Leticia back.     Action Taken: Message routed to:  [facility]aela [facility] Devi    Travel Screening: Not Applicable     Date of Service:

## 2025-03-28 ENCOUNTER — PATIENT OUTREACH (OUTPATIENT)
Dept: GASTROENTEROLOGY | Facility: CLINIC | Age: 30
End: 2025-03-28
Payer: COMMERCIAL

## 2025-03-28 NOTE — TELEPHONE ENCOUNTER
Contacted patient's mother Leticia in response to missed pre op exam and mychart communication that pt is feeling ill with respiratory symptoms. Did have an office visit with strep test which was negative. Did get started on an antibiotic. Leticia said that Dillon is not feeling well enough for procedure on Friday 4/4 and would like to reschedule. Did offer several dates the next week that conflicted with other appointments that Dillon had. Decided on Weds 4/16 with Dr Wright, they do prefer early morning procedure timing.                          Did share Dr Wright's 2/24/25 procedure recs since date of procedure is now beyond the recommended 4 week follow up. And advised coming to the ED if experiencing:   - If patient develops fever, chills, or worsening    abdominal pain or pancreatitis before scheduled ERCP,    will recommend patient to call us immediately for    consideration of an earlier urgent ERCP     Procedure/Imaging/Clinic: ERCP (Endoscopic Retrograde Cholangiopancreatography)  Physician: Kyle  Timing: Next available  Scope time: Provider average   Anesthesia: General  Dx: Biliary stricture  Tier:3  Location: [facility]  OK to schedule while attending: Not specified by provider  Patient communication letter header:ERCP (Endoscopic Retrograde Cholangiopancreatography)           Pre-op : Leticia will reschedule with PCP office at [facility] within 30 days of procedure date.      Med Review     Blood thinner -  None  ASA - None  Diabetic - Insulin   Injectable or oral medications for weight loss - None     Patient Education r/t procedure: Discussion/Mychart. Message routed to OR .     Gwen Polanco, RN, BSN,   [facility]  Care coordinator

## 2025-04-07 ENCOUNTER — HOSPITAL ENCOUNTER (EMERGENCY)
Facility: CLINIC | Age: 30
Discharge: HOME | End: 2025-04-07
Attending: EMERGENCY MEDICINE | Admitting: EMERGENCY MEDICINE
Payer: MEDICARE

## 2025-04-07 VITALS
WEIGHT: 173.1 LBS | HEART RATE: 65 BPM | BODY MASS INDEX: 28.81 KG/M2 | SYSTOLIC BLOOD PRESSURE: 141 MMHG | TEMPERATURE: 98.2 F | RESPIRATION RATE: 21 BRPM | OXYGEN SATURATION: 100 % | DIASTOLIC BLOOD PRESSURE: 82 MMHG

## 2025-04-07 DIAGNOSIS — E87.5 POTASSIUM SERUM INCREASED: ICD-10-CM

## 2025-04-07 LAB
ANION GAP SERPL CALCULATED.3IONS-SCNC: 10 MMOL/L (ref 7–15)
ATRIAL RATE - MUSE: 67 BPM
BUN SERPL-MCNC: 31 MG/DL (ref 6–20)
CALCIUM SERPL-MCNC: 9.4 MG/DL (ref 8.8–10.4)
CHLORIDE SERPL-SCNC: 108 MMOL/L (ref 98–107)
CREAT SERPL-MCNC: 1.3 MG/DL (ref 0.67–1.17)
DIASTOLIC BLOOD PRESSURE - MUSE: 102 MMHG
EGFRCR SERPLBLD CKD-EPI 2021: 75 ML/MIN/1.73M2
GLUCOSE SERPL-MCNC: 123 MG/DL (ref 70–99)
HCO3 SERPL-SCNC: 21 MMOL/L (ref 22–29)
HOLD SPECIMEN: NORMAL
INTERPRETATION ECG - MUSE: NORMAL
P AXIS - MUSE: 52 DEGREES
POTASSIUM SERPL-SCNC: 5.8 MMOL/L (ref 3.4–5.3)
PR INTERVAL - MUSE: 176 MS
QRS DURATION - MUSE: 82 MS
QT - MUSE: 380 MS
QTC - MUSE: 401 MS
R AXIS - MUSE: 53 DEGREES
SODIUM SERPL-SCNC: 139 MMOL/L (ref 135–145)
SYSTOLIC BLOOD PRESSURE - MUSE: 150 MMHG
T AXIS - MUSE: 23 DEGREES
VENTRICULAR RATE- MUSE: 67 BPM

## 2025-04-07 PROCEDURE — 93005 ELECTROCARDIOGRAM TRACING: CPT | Performed by: EMERGENCY MEDICINE

## 2025-04-07 PROCEDURE — 80048 BASIC METABOLIC PNL TOTAL CA: CPT | Performed by: EMERGENCY MEDICINE

## 2025-04-07 PROCEDURE — 82310 ASSAY OF CALCIUM: CPT | Performed by: EMERGENCY MEDICINE

## 2025-04-07 PROCEDURE — 36415 COLL VENOUS BLD VENIPUNCTURE: CPT | Performed by: EMERGENCY MEDICINE

## 2025-04-07 PROCEDURE — 99284 EMERGENCY DEPT VISIT MOD MDM: CPT | Performed by: EMERGENCY MEDICINE

## 2025-04-07 ASSESSMENT — COLUMBIA-SUICIDE SEVERITY RATING SCALE - C-SSRS
2. HAVE YOU ACTUALLY HAD ANY THOUGHTS OF KILLING YOURSELF IN THE PAST MONTH?: NO
1. IN THE PAST MONTH, HAVE YOU WISHED YOU WERE DEAD OR WISHED YOU COULD GO TO SLEEP AND NOT WAKE UP?: NO
6. HAVE YOU EVER DONE ANYTHING, STARTED TO DO ANYTHING, OR PREPARED TO DO ANYTHING TO END YOUR LIFE?: NO

## 2025-04-07 ASSESSMENT — ACTIVITIES OF DAILY LIVING (ADL)
ADLS_ACUITY_SCORE: 53
ADLS_ACUITY_SCORE: 53

## 2025-04-07 NOTE — ED PROVIDER NOTES
EMERGENCY DEPARTMENT ENCOUNTER     NAME: Dillon Salter   AGE: 29 year old male   YOB: 1995   MRN: [id number]   EVALUATION DATE & TIME: 4/7/2025  1:19 PM   PCP: Gary Rodrigues     Chief Complaint   Patient presents with    Abnormal Labs   :    FINAL IMPRESSION       1. Potassium serum increased           ED COURSE & MEDICAL DECISION MAKING    1:30 PM I met with the patient, obtained history, performed an initial exam, and discussed options and plan for diagnostics and treatment here in the ED.  2:27 PM I rechecked and updated the patient on her lab results. Discussed further plan of care.  2:44 PM I spoke with Dr. Gary Rodrigues, the patient's primary care provider.  2:46 PM I rechecked and updated the patient on further plan of care, and discussed plan for discharge to home.    Pertinent Labs & Imaging studies reviewed. (See chart for details)   29 year old male  presents to the Emergency Department for evaluation of elevated potassium level on basic labs done 3 days ago. There were multiple attempts to reach patient. K 6.1. Asymptomatic. Initial Vitals Reviewed. Initial exam notable for nontoxic, sleeping. Labs repeated and And today is 5.8.  No EKG changes.  Called and discussed with his PCP over the phone and this is low enough that it does not require emergency treatment today.  It sounds like he actually has an appointment in 4 days for preoperative physical before procedure so it can be repeated at that time.  Patient and family comfortable with discharge plan and feel reassured.           At the conclusion of the encounter I discussed the results of all of the tests and the disposition. The questions were answered. The patient or family acknowledged understanding and was agreeable with the care plan.     0 minutes critical care time, see procedure note below for details if relevant    Medical Decision Making  I obtained history from Family Member/Significant Other  Discharge. No  recommendations on prescription strength medication(s). See documentation for any additional details.,  I discussed the case with his primary care physician over the phone    MIPS (CTPE, Dental pain, Negron, Sinusitis, Asthma/COPD, Head Trauma): Not Applicable    SEPSIS: None                      MEDICATIONS GIVEN IN THE EMERGENCY:   Medications - No data to display   NEW PRESCRIPTIONS STARTED AT TODAY'S ER VISIT   New Prescriptions    No medications on file     ================================================================   HISTORY OF PRESENT ILLNESS       Patient information was obtained from: The patient   Use of Intrepreter: N/A    Dillon Salter is a 29 year old male with history of autism spectrum disorder, HTN, chronic pain, T2DM, ASHISH, hepatic encephalopathy, alcoholic hepatitis, and alcoholic cirrhosis of liver with ascites s/p liver transplantation, who presents with concerns regarding an elevated potassium level.    Per chart review, the patient had labs drawn at a [facility] on 4/4/2025, and his potassium level was at 6 at that time.    The patient reports he was referred to the ED by his primary care provider after she was diagnosed with some flu symptoms. He had a follow up and had labs re-drawn at a [facility], and he was informed that he had an elevated potassium level. The patient states that he's been experiencing non-specific viral infection symptoms which include cough, congestion, headaches, body aches, fevers and sore throat and notes that these symptoms have been persistent. He denies having any pain or any other new symptoms at this time. He has underwent a liver transplant in the past. No other reported complaints or concerns at this time.    ================================================================        PAST HISTORY     PAST MEDICAL HISTORY:   Past Medical History:   Diagnosis Date    Acute on chronic anemia 04/08/2022    Alcohol use disorder      Alcohol use disorder, severe, dependence (H) 07/11/2022    Alcoholic cirrhosis of liver with ascites (H)     Alcoholic hepatitis (H)     Autism spectrum disorder 04/08/2022    High functioning autistic.  26-year-old history of autism/Asperger's on the spectrum graduated high school at [employer] worked at  and then another  place until the Covid epidemic in 2020 lives with parents (Leticia and Wes) socially isolated drinks alcohol beer daily     Benign essential hypertension     Bleeding esophageal varices (H) 06/18/2022    Hepatic encephalopathy (H)     Hyponatremia 07/28/2022    Major depressive disorder     Neuropathic pain     Status post liver transplantation (H) 02/28/2023    Type II Diabetes 2012      PAST SURGICAL HISTORY:   Past Surgical History:   Procedure Laterality Date    BENCH LIVER  2/28/2023    Procedure: Bench liver;  Surgeon: Thelma Sterling MD;  Location: [facility]    COLONOSCOPY N/A 1/27/2023    Procedure: Colonoscopy;  Surgeon: Osman Montoya MD;  Location: [facility]    ENDOSCOPIC RETROGRADE CHOLANGIOPANCREATOGRAM N/A 5/24/2023    Procedure: Endoscopic retrograde cholangiopancreatogram. Biliary sphincterotomy, dilation, abnd stent placement;  Surgeon: Jarrell Mullins MD;  Location: [facility]    ENDOSCOPIC RETROGRADE CHOLANGIOPANCREATOGRAM N/A 2/24/2025    Procedure: ENDOSCOPIC RETROGRADE CHOLANGIOPANCREATOGRAPHY with biliary sludge removal, dilation and stent placement;  Surgeon: Guru Adeline Wright MD;  Location: [facility]    ENDOSCOPIC RETROGRADE CHOLANGIOPANCREATOGRAPHY, EXCHANGE TUBE/STENT N/A 7/24/2023    Procedure: ENDOSCOPIC RETROGRADE CHOLANGIOPANCREATOGRAPHY, WITH REPLACEMENT OF STENT, stone and sludge removal;  Surgeon: Guru Adeline Wright MD;  Location: [facility]    ENDOSCOPIC RETROGRADE CHOLANGIOPANCREATOGRAPHY, EXCHANGE TUBE/STENT N/A 11/8/2023    Procedure: ENDOSCOPIC RETROGRADE CHOLANGIOPANCREATOGRAPHY, WITH REPLACEMENT OF TUBE OR STENT, sludge  removal, and biliary duct dilation.;  Surgeon: Guru Adeline Wright MD;  Location: [facility]    ENDOSCOPIC ULTRASOUND LOWER GASTROINTESTIONAL TRACT (GI) N/A 2023    Procedure: ULTRASOUND, LOWER GI TRACT, ENDOSCOPIC with glueing;  Surgeon: Osman Montoya MD;  Location: [facility]    ESOPHAGOSCOPY, GASTROSCOPY, DUODENOSCOPY (EGD), COMBINED N/A 2022    Procedure: ESOPHAGOGASTRODUODENOSCOPY (EGD) with esophageal banding;  Surgeon: Gary Hurst MD;  Location: [facility]    ESOPHAGOSCOPY, GASTROSCOPY, DUODENOSCOPY (EGD), COMBINED N/A 2022    Procedure: ESOPHAGOGASTRODUODENOSCOPY;  Surgeon: Gavino Reza MD;  Location: [facility]    ESOPHAGOSCOPY, GASTROSCOPY, DUODENOSCOPY (EGD), COMBINED N/A 2023    Procedure: ESOPHAGOGASTRODUODENOSCOPY (EGD) with esophageal variceal banding;  Surgeon: Juan Boo MD;  Location: [facility]    ESOPHAGOSCOPY, GASTROSCOPY, DUODENOSCOPY (EGD), COMBINED N/A 2023    Procedure: ESOPHAGOGASTRODUODENOSCOPY (EGD);  Surgeon: Juan Boo MD;  Location: [facility]    ESOPHAGOSCOPY, GASTROSCOPY, DUODENOSCOPY (EGD), COMBINED N/A 2023    Procedure: Esophagoscopy, gastroscopy, duodenoscopy (EGD), combined;  Surgeon: Osman Montoya MD;  Location: [facility]    ESOPHAGOSCOPY, GASTROSCOPY, DUODENOSCOPY (EGD), COMBINED N/A 2023    Procedure: ESOPHAGOGASTRODUODENOSCOPY (EGD);  Surgeon: Leventhal, Thomas Michael, MD;  Location: [facility]    IR CVC NON TUNNEL LINE REMOVAL  3/10/2023    IR CVC TUNNEL PLACEMENT > 5 YRS OF AGE  3/10/2023    IR CVC TUNNEL REMOVAL RIGHT  2023    IR LIVER BIOPSY PERCUTANEOUS  2023    MIDLINE DOUBLE LUMEN PLACEMENT  2022         PICC TRIPLE LUMEN PLACEMENT  2022         RETURN LIVER TRANSPLANT N/A 3/1/2023    Procedure: RETURN TO OPERATING ROOM, AFTER LIVER TRANSPLANT;  Surgeon: Thelma Sterling MD;  Location: [facility]    TRANSPLANT LIVER RECIPIENT  DONOR N/A  2023    Procedure: Transplant liver recipient  donor;  Surgeon: Thelma Sterling MD;  Location:  OR      CURRENT MEDICATIONS:   acetaminophen (TYLENOL) 325 MG tablet  FLUoxetine (PROZAC) 20 MG capsule  gabapentin (NEURONTIN) 100 MG capsule  insulin aspart (NOVOLOG FLEXPEN) 100 UNIT/ML pen  insulin glargine (LANTUS PEN) 100 UNIT/ML pen  magnesium glycinate 100 MG CAPS capsule  magnesium oxide (MAG-OX) 400 MG tablet  metoprolol tartrate (LOPRESSOR) 25 MG tablet  multivitamin w/minerals (THERA-VIT-M) tablet  oxyCODONE (ROXICODONE) 5 MG tablet  semaglutide (OZEMPIC) 2 MG/3ML pen  tacrolimus (GENERIC EQUIVALENT) 0.5 MG capsule  tacrolimus (GENERIC EQUIVALENT) 1 MG capsule  ursodiol (ACTIGALL) 300 MG capsule  Vitamin D3 (CHOLECALCIFEROL) 25 mcg (1000 units) tablet      ALLERGIES:   No Known Allergies   FAMILY HISTORY:   Family History   Problem Relation Age of Onset    Hypertension Mother     Substance Abuse Mother     Thyroid Disease Mother     Heart Failure Father     Substance Abuse Father     Chronic Obstructive Pulmonary Disease Father     Substance Abuse Maternal Grandfather     Deep Vein Thrombosis (DVT) Paternal Uncle     Anesthesia Reaction No family hx of       SOCIAL HISTORY:   Social History     Socioeconomic History    Marital status: Single   Tobacco Use    Smoking status: Former     Types: Cigarettes, Vaping Device     Passive exposure: Past    Smokeless tobacco: Never    Tobacco comments:     A pack lasted a year, hardly smoked   Vaping Use    Vaping status: Former   Substance and Sexual Activity    Alcohol use: Not Currently     Comment: No ETOH since 20    Drug use: Not Currently     Types: Marijuana     Comment: drinks   Social History Narrative    26-year-old history of autism/Asperger's on the spectrum graduated high school at [employer] 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  lives with parents (Leticia and Wes) socially isolated drinks alcohol  beer daily        End-stage cirrhosis noted on admission to  April 2022     Social Drivers of Health     Interpersonal Safety: Low Risk  (2/24/2025)    Interpersonal Safety     Do you feel physically and emotionally safe where you currently live?: Yes     Within the past 12 months, have you been hit, slapped, kicked or otherwise physically hurt by someone?: No     Within the past 12 months, have you been humiliated or emotionally abused in other ways by your partner or ex-partner?: No        VITALS  Patient Vitals for the past 24 hrs:   BP Temp Temp src Pulse Resp SpO2 Weight   04/07/25 1401 (!) 140/82 -- -- 65 21 98 % --   04/07/25 1400 (!) 140/82 -- -- 65 22 98 % --   04/07/25 1335 (!) 150/102 -- -- 68 17 100 % --   04/07/25 1324 (!) 139/92 98.2  F (36.8  C) Oral 59 18 95 % 78.5 kg (173 lb 1.6 oz)        ================================================================    PHYSICAL EXAM     VITAL SIGNS: BP (!) 140/82   Pulse 65   Temp 98.2  F (36.8  C) (Oral)   Resp 21   Wt 78.5 kg (173 lb 1.6 oz)   SpO2 98%   BMI 28.81 kg/m     Constitutional:  Awake, no acute distress  HENT:  Atraumatic, oropharynx without exudate or erythema, membranes moist  Lymph:  No adenopathy  Eyes: EOM intact, PERRL, no injection  Neck: Supple  Respiratory:  Clear to auscultation bilaterally, no wheezes or crackles   Cardiovascular:  Regular rate and rhythm, single S1 and S2   GI:  Soft, nontender, nondistended, no rebound or guarding   Musculoskeletal:  Moves all extremities, no lower extremity edema, no deformities    Skin:  Warm, dry  Neurologic:  Alert and oriented x3, no focal deficits noted       ================================================================  LAB       All pertinent labs reviewed and interpreted.   Labs Ordered and Resulted from Time of ED Arrival to Time of ED Departure   BASIC METABOLIC PANEL - Abnormal       Result Value    Sodium 139      Potassium 5.8 (*)     Chloride 108 (*)     Carbon Dioxide (CO2) 21  (*)     Anion Gap 10      Urea Nitrogen 31.0 (*)     Creatinine 1.30 (*)     GFR Estimate 75      Calcium 9.4      Glucose 123 (*)         ===============================================================  RADIOLOGY       Reviewed all pertinent imaging. Please see official radiology report.   No orders to display         ================================================================  EKG       EKG reviewed and interpreted by me shows sinus rhythm with a rate of 67, normal axis, QTc 401 with no acute ST or T wave changes since May 2023    No previous ECGs available for comparison.    I have independently reviewed and interpreted the EKG(s) documented above.     ================================================================  PROCEDURES         I, Meg Nelson, am serving as a scribe to document services personally performed by Dr. Grant based on my observation and the provider's statements to me. I, Marifer Grant MD attest that Meg Nelson is acting in a scribe capacity, has observed my performance of the services and has documented them in accordance with my direction.   Marifer rGant M.D.   Emergency Medicine   [facility] EMERGENCY ROOM  [address] [facility] [id number] [phone]  Dept: [phone]       Marifer Grant MD  04/07/25 4923

## 2025-04-07 NOTE — DISCHARGE INSTRUCTIONS
Fortunately, the levels aren't high enough to require emergency treatment. Have your potassium rechecked late this week or early next week.

## 2025-04-07 NOTE — ED TRIAGE NOTES
Patient states he was sent here by primary after diagnosed with some flu symptoms, had follow up labs and was found to have high potassium. Liver transplant patient.      Triage Assessment (Adult)       Row Name 04/07/25 8348          Triage Assessment    Airway WDL WDL        Respiratory WDL    Respiratory WDL WDL        Skin Circulation/Temperature WDL    Skin Circulation/Temperature WDL WDL        Cardiac WDL    Cardiac WDL WDL        Peripheral/Neurovascular WDL    Peripheral Neurovascular WDL WDL        Cognitive/Neuro/Behavioral WDL    Cognitive/Neuro/Behavioral WDL WDL

## 2025-04-14 DIAGNOSIS — Z94.4 LIVER REPLACED BY TRANSPLANT (H): Primary | ICD-10-CM

## 2025-04-14 DIAGNOSIS — F10.11 ALCOHOL ABUSE, IN REMISSION: ICD-10-CM

## 2025-04-16 ENCOUNTER — HOSPITAL ENCOUNTER (OUTPATIENT)
Facility: CLINIC | Age: 30
Discharge: HOME | End: 2025-04-16
Attending: INTERNAL MEDICINE | Admitting: INTERNAL MEDICINE
Payer: MEDICARE

## 2025-04-16 ENCOUNTER — ANESTHESIA EVENT (OUTPATIENT)
Dept: SURGERY | Facility: CLINIC | Age: 30
End: 2025-04-16
Payer: MEDICARE

## 2025-04-16 ENCOUNTER — ANESTHESIA (OUTPATIENT)
Dept: SURGERY | Facility: CLINIC | Age: 30
End: 2025-04-16
Payer: MEDICARE

## 2025-04-16 VITALS
RESPIRATION RATE: 14 BRPM | DIASTOLIC BLOOD PRESSURE: 98 MMHG | TEMPERATURE: 98 F | HEIGHT: 65 IN | SYSTOLIC BLOOD PRESSURE: 135 MMHG | BODY MASS INDEX: 29.09 KG/M2 | OXYGEN SATURATION: 96 % | WEIGHT: 174.6 LBS | HEART RATE: 61 BPM

## 2025-04-16 LAB
ALBUMIN SERPL BCG-MCNC: 3.8 G/DL (ref 3.5–5.2)
ALP SERPL-CCNC: 213 U/L (ref 40–150)
ALT SERPL W P-5'-P-CCNC: 232 U/L (ref 0–70)
ANION GAP SERPL CALCULATED.3IONS-SCNC: 9 MMOL/L (ref 7–15)
AST SERPL W P-5'-P-CCNC: 93 U/L (ref 0–45)
BILIRUB SERPL-MCNC: 0.7 MG/DL
BUN SERPL-MCNC: 28.8 MG/DL (ref 6–20)
CALCIUM SERPL-MCNC: 9.2 MG/DL (ref 8.8–10.4)
CHLORIDE SERPL-SCNC: 102 MMOL/L (ref 98–107)
CREAT SERPL-MCNC: 1.36 MG/DL (ref 0.67–1.17)
EGFRCR SERPLBLD CKD-EPI 2021: 71 ML/MIN/1.73M2
ERCP: NORMAL
ERYTHROCYTE [DISTWIDTH] IN BLOOD BY AUTOMATED COUNT: 13.5 % (ref 10–15)
GLUCOSE SERPL-MCNC: 221 MG/DL (ref 70–99)
HCO3 SERPL-SCNC: 22 MMOL/L (ref 22–29)
HCT VFR BLD AUTO: 34.2 % (ref 40–53)
HGB BLD-MCNC: 11.4 G/DL (ref 13.3–17.7)
INR PPP: 1.02 (ref 0.85–1.15)
LIPASE SERPL-CCNC: 15 U/L (ref 13–60)
MCH RBC QN AUTO: 29.5 PG (ref 26.5–33)
MCHC RBC AUTO-ENTMCNC: 33.3 G/DL (ref 31.5–36.5)
MCV RBC AUTO: 89 FL (ref 78–100)
PLATELET # BLD AUTO: 157 10E3/UL (ref 150–450)
POTASSIUM SERPL-SCNC: 4.6 MMOL/L (ref 3.4–5.3)
PROT SERPL-MCNC: 6.6 G/DL (ref 6.4–8.3)
RBC # BLD AUTO: 3.86 10E6/UL (ref 4.4–5.9)
SODIUM SERPL-SCNC: 133 MMOL/L (ref 135–145)
WBC # BLD AUTO: 5.3 10E3/UL (ref 4–11)

## 2025-04-16 PROCEDURE — 250N000024 HC ISOFLURANE, PER MIN: Performed by: INTERNAL MEDICINE

## 2025-04-16 PROCEDURE — 999N000141 HC STATISTIC PRE-PROCEDURE NURSING ASSESSMENT: Performed by: INTERNAL MEDICINE

## 2025-04-16 PROCEDURE — 250N000013 HC RX MED GY IP 250 OP 250 PS 637

## 2025-04-16 PROCEDURE — 258N000003 HC RX IP 258 OP 636

## 2025-04-16 PROCEDURE — C1726 CATH, BAL DIL, NON-VASCULAR: HCPCS | Performed by: INTERNAL MEDICINE

## 2025-04-16 PROCEDURE — 710N000010 HC RECOVERY PHASE 1, LEVEL 2, PER MIN: Performed by: INTERNAL MEDICINE

## 2025-04-16 PROCEDURE — 85018 HEMOGLOBIN: CPT | Performed by: INTERNAL MEDICINE

## 2025-04-16 PROCEDURE — 272N000001 HC OR GENERAL SUPPLY STERILE: Performed by: INTERNAL MEDICINE

## 2025-04-16 PROCEDURE — 255N000002 HC RX 255 OP 636: Performed by: INTERNAL MEDICINE

## 2025-04-16 PROCEDURE — 250N000011 HC RX IP 250 OP 636

## 2025-04-16 PROCEDURE — 82565 ASSAY OF CREATININE: CPT | Performed by: INTERNAL MEDICINE

## 2025-04-16 PROCEDURE — 360N000082 HC SURGERY LEVEL 2 W/ FLUORO, PER MIN: Performed by: INTERNAL MEDICINE

## 2025-04-16 PROCEDURE — 250N000009 HC RX 250: Performed by: INTERNAL MEDICINE

## 2025-04-16 PROCEDURE — 710N000012 HC RECOVERY PHASE 2, PER MINUTE: Performed by: INTERNAL MEDICINE

## 2025-04-16 PROCEDURE — 370N000017 HC ANESTHESIA TECHNICAL FEE, PER MIN: Performed by: INTERNAL MEDICINE

## 2025-04-16 PROCEDURE — 85610 PROTHROMBIN TIME: CPT | Performed by: INTERNAL MEDICINE

## 2025-04-16 PROCEDURE — C1769 GUIDE WIRE: HCPCS | Performed by: INTERNAL MEDICINE

## 2025-04-16 PROCEDURE — 82947 ASSAY GLUCOSE BLOOD QUANT: CPT | Performed by: INTERNAL MEDICINE

## 2025-04-16 PROCEDURE — 250N000009 HC RX 250

## 2025-04-16 PROCEDURE — 83690 ASSAY OF LIPASE: CPT | Performed by: INTERNAL MEDICINE

## 2025-04-16 PROCEDURE — 250N000011 HC RX IP 250 OP 636: Mod: JZ

## 2025-04-16 PROCEDURE — C2617 STENT, NON-COR, TEM W/O DEL: HCPCS | Performed by: INTERNAL MEDICINE

## 2025-04-16 DEVICE — IMPLANTABLE DEVICE: Type: IMPLANTABLE DEVICE | Site: BILE DUCT | Status: FUNCTIONAL

## 2025-04-16 RX ORDER — NALOXONE HYDROCHLORIDE 0.4 MG/ML
0.2 INJECTION, SOLUTION INTRAMUSCULAR; INTRAVENOUS; SUBCUTANEOUS
Status: DISCONTINUED | OUTPATIENT
Start: 2025-04-16 | End: 2025-04-16 | Stop reason: HOSPADM

## 2025-04-16 RX ORDER — INDOMETHACIN 50 MG/1
SUPPOSITORY RECTAL PRN
Status: DISCONTINUED | OUTPATIENT
Start: 2025-04-16 | End: 2025-04-16 | Stop reason: HOSPADM

## 2025-04-16 RX ORDER — DEXAMETHASONE SODIUM PHOSPHATE 4 MG/ML
4 INJECTION, SOLUTION INTRA-ARTICULAR; INTRALESIONAL; INTRAMUSCULAR; INTRAVENOUS; SOFT TISSUE
Status: DISCONTINUED | OUTPATIENT
Start: 2025-04-16 | End: 2025-04-16 | Stop reason: HOSPADM

## 2025-04-16 RX ORDER — ONDANSETRON 2 MG/ML
INJECTION INTRAMUSCULAR; INTRAVENOUS PRN
Status: DISCONTINUED | OUTPATIENT
Start: 2025-04-16 | End: 2025-04-16

## 2025-04-16 RX ORDER — NALOXONE HYDROCHLORIDE 0.4 MG/ML
0.1 INJECTION, SOLUTION INTRAMUSCULAR; INTRAVENOUS; SUBCUTANEOUS
Status: DISCONTINUED | OUTPATIENT
Start: 2025-04-16 | End: 2025-04-16 | Stop reason: HOSPADM

## 2025-04-16 RX ORDER — ONDANSETRON 4 MG/1
4 TABLET, ORALLY DISINTEGRATING ORAL EVERY 6 HOURS PRN
Status: DISCONTINUED | OUTPATIENT
Start: 2025-04-16 | End: 2025-04-16 | Stop reason: HOSPADM

## 2025-04-16 RX ORDER — ONDANSETRON 4 MG/1
4 TABLET, ORALLY DISINTEGRATING ORAL EVERY 30 MIN PRN
Status: DISCONTINUED | OUTPATIENT
Start: 2025-04-16 | End: 2025-04-16 | Stop reason: HOSPADM

## 2025-04-16 RX ORDER — HYDROMORPHONE HCL IN WATER/PF 6 MG/30 ML
0.4 PATIENT CONTROLLED ANALGESIA SYRINGE INTRAVENOUS EVERY 5 MIN PRN
Status: DISCONTINUED | OUTPATIENT
Start: 2025-04-16 | End: 2025-04-16 | Stop reason: HOSPADM

## 2025-04-16 RX ORDER — ONDANSETRON 2 MG/ML
4 INJECTION INTRAMUSCULAR; INTRAVENOUS EVERY 30 MIN PRN
Status: DISCONTINUED | OUTPATIENT
Start: 2025-04-16 | End: 2025-04-16 | Stop reason: HOSPADM

## 2025-04-16 RX ORDER — HYDROMORPHONE HCL IN WATER/PF 6 MG/30 ML
0.2 PATIENT CONTROLLED ANALGESIA SYRINGE INTRAVENOUS EVERY 5 MIN PRN
Status: DISCONTINUED | OUTPATIENT
Start: 2025-04-16 | End: 2025-04-16 | Stop reason: HOSPADM

## 2025-04-16 RX ORDER — PROCHLORPERAZINE MALEATE 5 MG/1
10 TABLET ORAL EVERY 6 HOURS PRN
Status: DISCONTINUED | OUTPATIENT
Start: 2025-04-16 | End: 2025-04-16 | Stop reason: HOSPADM

## 2025-04-16 RX ORDER — FENTANYL CITRATE 50 UG/ML
INJECTION, SOLUTION INTRAMUSCULAR; INTRAVENOUS PRN
Status: DISCONTINUED | OUTPATIENT
Start: 2025-04-16 | End: 2025-04-16

## 2025-04-16 RX ORDER — LIDOCAINE 40 MG/G
CREAM TOPICAL
Status: DISCONTINUED | OUTPATIENT
Start: 2025-04-16 | End: 2025-04-16 | Stop reason: HOSPADM

## 2025-04-16 RX ORDER — DEXAMETHASONE SODIUM PHOSPHATE 4 MG/ML
INJECTION, SOLUTION INTRA-ARTICULAR; INTRALESIONAL; INTRAMUSCULAR; INTRAVENOUS; SOFT TISSUE PRN
Status: DISCONTINUED | OUTPATIENT
Start: 2025-04-16 | End: 2025-04-16

## 2025-04-16 RX ORDER — NALOXONE HYDROCHLORIDE 0.4 MG/ML
0.4 INJECTION, SOLUTION INTRAMUSCULAR; INTRAVENOUS; SUBCUTANEOUS
Status: DISCONTINUED | OUTPATIENT
Start: 2025-04-16 | End: 2025-04-16 | Stop reason: HOSPADM

## 2025-04-16 RX ORDER — FENTANYL CITRATE 50 UG/ML
25 INJECTION, SOLUTION INTRAMUSCULAR; INTRAVENOUS EVERY 5 MIN PRN
Status: DISCONTINUED | OUTPATIENT
Start: 2025-04-16 | End: 2025-04-16 | Stop reason: HOSPADM

## 2025-04-16 RX ORDER — SODIUM CHLORIDE, SODIUM LACTATE, POTASSIUM CHLORIDE, CALCIUM CHLORIDE 600; 310; 30; 20 MG/100ML; MG/100ML; MG/100ML; MG/100ML
INJECTION, SOLUTION INTRAVENOUS CONTINUOUS PRN
Status: DISCONTINUED | OUTPATIENT
Start: 2025-04-16 | End: 2025-04-16

## 2025-04-16 RX ORDER — IOPAMIDOL 510 MG/ML
INJECTION, SOLUTION INTRAVASCULAR PRN
Status: DISCONTINUED | OUTPATIENT
Start: 2025-04-16 | End: 2025-04-16 | Stop reason: HOSPADM

## 2025-04-16 RX ORDER — FLUMAZENIL 0.1 MG/ML
0.2 INJECTION, SOLUTION INTRAVENOUS
Status: DISCONTINUED | OUTPATIENT
Start: 2025-04-16 | End: 2025-04-16 | Stop reason: HOSPADM

## 2025-04-16 RX ORDER — ONDANSETRON 2 MG/ML
4 INJECTION INTRAMUSCULAR; INTRAVENOUS EVERY 6 HOURS PRN
Status: DISCONTINUED | OUTPATIENT
Start: 2025-04-16 | End: 2025-04-16 | Stop reason: HOSPADM

## 2025-04-16 RX ORDER — OXYCODONE HYDROCHLORIDE 10 MG/1
10 TABLET ORAL
Status: DISCONTINUED | OUTPATIENT
Start: 2025-04-16 | End: 2025-04-16 | Stop reason: HOSPADM

## 2025-04-16 RX ORDER — LIDOCAINE HYDROCHLORIDE 20 MG/ML
INJECTION, SOLUTION INFILTRATION; PERINEURAL PRN
Status: DISCONTINUED | OUTPATIENT
Start: 2025-04-16 | End: 2025-04-16

## 2025-04-16 RX ORDER — CIPROFLOXACIN 2 MG/ML
INJECTION, SOLUTION INTRAVENOUS PRN
Status: DISCONTINUED | OUTPATIENT
Start: 2025-04-16 | End: 2025-04-16

## 2025-04-16 RX ORDER — OXYCODONE HYDROCHLORIDE 5 MG/1
5 TABLET ORAL
Status: COMPLETED | OUTPATIENT
Start: 2025-04-16 | End: 2025-04-16

## 2025-04-16 RX ORDER — FENTANYL CITRATE 50 UG/ML
50 INJECTION, SOLUTION INTRAMUSCULAR; INTRAVENOUS EVERY 5 MIN PRN
Status: DISCONTINUED | OUTPATIENT
Start: 2025-04-16 | End: 2025-04-16 | Stop reason: HOSPADM

## 2025-04-16 RX ORDER — PROPOFOL 10 MG/ML
INJECTION, EMULSION INTRAVENOUS PRN
Status: DISCONTINUED | OUTPATIENT
Start: 2025-04-16 | End: 2025-04-16

## 2025-04-16 RX ADMIN — FENTANYL CITRATE 50 MCG: 50 INJECTION INTRAMUSCULAR; INTRAVENOUS at 14:24

## 2025-04-16 RX ADMIN — DEXAMETHASONE SODIUM PHOSPHATE 4 MG: 4 INJECTION, SOLUTION INTRA-ARTICULAR; INTRALESIONAL; INTRAMUSCULAR; INTRAVENOUS; SOFT TISSUE at 14:20

## 2025-04-16 RX ADMIN — FENTANYL CITRATE 50 MCG: 50 INJECTION, SOLUTION INTRAMUSCULAR; INTRAVENOUS at 15:10

## 2025-04-16 RX ADMIN — Medication 50 MG: at 13:53

## 2025-04-16 RX ADMIN — SODIUM CHLORIDE, SODIUM LACTATE, POTASSIUM CHLORIDE, AND CALCIUM CHLORIDE: .6; .31; .03; .02 INJECTION, SOLUTION INTRAVENOUS at 13:36

## 2025-04-16 RX ADMIN — Medication 100 MG: at 14:26

## 2025-04-16 RX ADMIN — HYDROMORPHONE HYDROCHLORIDE 0.4 MG: 0.2 INJECTION, SOLUTION INTRAMUSCULAR; INTRAVENOUS; SUBCUTANEOUS at 15:19

## 2025-04-16 RX ADMIN — HYDROMORPHONE HYDROCHLORIDE 0.4 MG: 0.2 INJECTION, SOLUTION INTRAMUSCULAR; INTRAVENOUS; SUBCUTANEOUS at 15:40

## 2025-04-16 RX ADMIN — OXYCODONE HYDROCHLORIDE 5 MG: 5 TABLET ORAL at 16:00

## 2025-04-16 RX ADMIN — FENTANYL CITRATE 50 MCG: 50 INJECTION, SOLUTION INTRAMUSCULAR; INTRAVENOUS at 15:03

## 2025-04-16 RX ADMIN — CIPROFLOXACIN 400 MG: 400 INJECTION, SOLUTION INTRAVENOUS at 13:48

## 2025-04-16 RX ADMIN — PROPOFOL 120 MG: 10 INJECTION, EMULSION INTRAVENOUS at 13:53

## 2025-04-16 RX ADMIN — MIDAZOLAM 2 MG: 1 INJECTION INTRAMUSCULAR; INTRAVENOUS at 13:41

## 2025-04-16 RX ADMIN — FENTANYL CITRATE 100 MCG: 50 INJECTION INTRAMUSCULAR; INTRAVENOUS at 13:44

## 2025-04-16 RX ADMIN — ONDANSETRON 4 MG: 2 INJECTION INTRAMUSCULAR; INTRAVENOUS at 14:20

## 2025-04-16 RX ADMIN — Medication 100 MG: at 14:33

## 2025-04-16 RX ADMIN — LIDOCAINE HYDROCHLORIDE 60 MG: 20 INJECTION, SOLUTION INFILTRATION; PERINEURAL at 13:53

## 2025-04-16 RX ADMIN — HYDROMORPHONE HYDROCHLORIDE 0.4 MG: 0.2 INJECTION, SOLUTION INTRAMUSCULAR; INTRAVENOUS; SUBCUTANEOUS at 15:27

## 2025-04-16 ASSESSMENT — ACTIVITIES OF DAILY LIVING (ADL)
ADLS_ACUITY_SCORE: 47
ADLS_ACUITY_SCORE: 46
ADLS_ACUITY_SCORE: 47
ADLS_ACUITY_SCORE: 46
ADLS_ACUITY_SCORE: 47
ADLS_ACUITY_SCORE: 46

## 2025-04-16 ASSESSMENT — LIFESTYLE VARIABLES: TOBACCO_USE: 1

## 2025-04-16 NOTE — DISCHARGE INSTRUCTIONS
Contacting your Doctor -   To contact a doctor, call Dr. Duron' office at [phone]    or:  [phone] and ask for the resident on call for Gastroenterology (answered 24 hours a day)   Emergency Department:  [facility]: [phone]  [facility]: [phone] 911 if you are in need of immediate or emergent help

## 2025-04-16 NOTE — ANESTHESIA PROCEDURE NOTES
Airway       Patient location during procedure: OR       Procedure Start/Stop Times: 4/16/2025 1:57 PM  Staff -        CRNA: Deni Sutton APRN CRNA       Performed By: CRNA  Consent for Airway        Urgency: elective  Indications and Patient Condition       Indications for airway management: jalen-procedural       Induction type:intravenous       Mask difficulty assessment: 1 - vent by mask    Final Airway Details       Final airway type: endotracheal airway       Successful airway: Oral and ETT - single  Endotracheal Airway Details        ETT size (mm): 7.5       Cuffed: yes       Successful intubation technique: direct laryngoscopy       DL Blade Type: MAC 3       Grade View of Cords: 1       Adjucts: stylet       Position: Right       Measured from: gums/teeth       Bite Block used: endoscopy bite block.    Post intubation assessment        Placement verified by: capnometry, equal breath sounds and chest rise        Number of attempts at approach: 1       Number of other approaches attempted: 0       Secured with: tape       Ease of procedure: easy       Dentition: Intact and Unchanged    Medication(s) Administered   Medication Administration Time: 4/16/2025 1:57 PM

## 2025-04-16 NOTE — ANESTHESIA POSTPROCEDURE EVALUATION
Patient: Dillon Salter    Procedure: Procedure(s):  ENDOSCOPIC RETROGRADE CHOLANGIOPANCREATOGRAPHY, WITH REPLACEMENT OF TUBE OR STENT and sludge removal.       Anesthesia Type:  General    Note:  Disposition: Outpatient   Postop Pain Control: Uneventful            Sign Out: Well controlled pain   PONV: No   Neuro/Psych: Uneventful            Sign Out: Acceptable/Baseline neuro status   Airway/Respiratory: Uneventful            Sign Out: Acceptable/Baseline resp. status   CV/Hemodynamics: Uneventful            Sign Out: Acceptable CV status; No obvious hypovolemia; No obvious fluid overload   Other NRE: NONE   DID A NON-ROUTINE EVENT OCCUR? No           Last vitals:  Vitals Value Taken Time   /109 04/16/25 1450   Temp     Pulse 59 04/16/25 1454   Resp 11 04/16/25 1454   SpO2 97 % 04/16/25 1454   Vitals shown include unfiled device data.    Electronically Signed By: Octavio Quiñonez MD  April 16, 2025  2:55 PM

## 2025-04-16 NOTE — ANESTHESIA CARE TRANSFER NOTE
Patient: Dillon Salter    Procedure: Procedure(s):  ENDOSCOPIC RETROGRADE CHOLANGIOPANCREATOGRAPHY, WITH REPLACEMENT OF TUBE OR STENT and sludge removal.       Diagnosis: Encounter for replacement of biliary stent [Z46.89]  Diagnosis Additional Information: No value filed.    Anesthesia Type:   General     Note:    Oropharynx: oropharynx clear of all foreign objects  Level of Consciousness: awake and drowsy  Oxygen Supplementation: room air    Independent Airway: airway patency satisfactory and stable  Dentition: dentition unchanged  Vital Signs Stable: post-procedure vital signs reviewed and stable  Report to RN Given: handoff report given  Patient transferred to: PACU    Handoff Report: Identifed the Patient, Identified the Reponsible Provider, Reviewed the pertinent medical history, Discussed the surgical course, Reviewed Intra-OP anesthesia mangement and issues during anesthesia, Set expectations for post-procedure period and Allowed opportunity for questions and acknowledgement of understanding  Vitals:  Vitals Value Taken Time   /109 04/16/25 1450   Temp     Pulse 63 04/16/25 1455   Resp 16 04/16/25 1455   SpO2 96 % 04/16/25 1455   Vitals shown include unfiled device data.    Electronically Signed By: VAMSHI Comer CRNA  April 16, 2025  2:56 PM

## 2025-04-16 NOTE — ANESTHESIA PREPROCEDURE EVALUATION
Anesthesia Pre-Procedure Evaluation    Patient: Dillon Salter   MRN: [id number] : 1995        Procedure : Procedure(s):  ENDOSCOPIC RETROGRADE CHOLANGIOPANCREATOGRAPHY, WITH REPLACEMENT OF TUBE OR STENT          Past Medical History:   Diagnosis Date    Acute on chronic anemia 2022    Alcohol use disorder     Alcohol use disorder, severe, dependence (H) 2022    Alcoholic cirrhosis of liver with ascites (H)     Alcoholic hepatitis (H)     Autism spectrum disorder 2022    High functioning autistic.  26-year-old history of autism/Asperger's on the spectrum graduated high school at [employer] worked at [employer] and then another  place until the Covid epidemic in  lives with parents (Leticia and Wes) socially isolated drinks alcohol beer daily     Benign essential hypertension     Bleeding esophageal varices (H) 2022    Hepatic encephalopathy (H)     Hyponatremia 2022    Major depressive disorder     Neuropathic pain     Status post liver transplantation (H) 2023    Type II Diabetes       Past Surgical History:   Procedure Laterality Date    BENCH LIVER  2023    Procedure: Bench liver;  Surgeon: Thelma Sterling MD;  [facility]    COLONOSCOPY N/A 2023    Procedure: Colonoscopy;  Surgeon: Osman Montoya MD;  [facility]    ENDOSCOPIC RETROGRADE CHOLANGIOPANCREATOGRAM N/A 2023    Procedure: Endoscopic retrograde cholangiopancreatogram. Biliary sphincterotomy, dilation, abnd stent placement;  Surgeon: Jarrell Mullins MD;  [facility]    ENDOSCOPIC RETROGRADE CHOLANGIOPANCREATOGRAM N/A 2025    Procedure: ENDOSCOPIC RETROGRADE CHOLANGIOPANCREATOGRAPHY with biliary sludge removal, dilation and stent placement;  Surgeon: Guru Adeline Wright MD;  [facility]    ENDOSCOPIC RETROGRADE CHOLANGIOPANCREATOGRAPHY, EXCHANGE TUBE/STENT N/A 2023    Procedure: ENDOSCOPIC RETROGRADE CHOLANGIOPANCREATOGRAPHY, WITH  Follow up in 6 months with Dr. Canales     Please get fasting blood test at least 1 day prior to next appointment. Fast for 10 hours. It is OK to take medications. DO DRINK ENOUGH WATER, but no coffee, tea, or other beverages     See Ophthalmology. Call [phone] to schedule appointment    REPLACEMENT OF STENT, stone and sludge removal;  Surgeon: Guru Adeline Wright MD;  [facility]    ENDOSCOPIC RETROGRADE CHOLANGIOPANCREATOGRAPHY, EXCHANGE TUBE/STENT N/A 11/8/2023    Procedure: ENDOSCOPIC RETROGRADE CHOLANGIOPANCREATOGRAPHY, WITH REPLACEMENT OF TUBE OR STENT, sludge removal, and biliary duct dilation.;  Surgeon: Guru Adeline Wright MD;  [facility]    ENDOSCOPIC ULTRASOUND LOWER GASTROINTESTIONAL TRACT (GI) N/A 1/27/2023    Procedure: ULTRASOUND, LOWER GI TRACT, ENDOSCOPIC with glueing;  Surgeon: Osman Montoya MD;  [facility]    ESOPHAGOSCOPY, GASTROSCOPY, DUODENOSCOPY (EGD), COMBINED N/A 5/24/2022    Procedure: ESOPHAGOGASTRODUODENOSCOPY (EGD) with esophageal banding;  Surgeon: Gary Hurst MD;  Location: [facility]    ESOPHAGOSCOPY, GASTROSCOPY, DUODENOSCOPY (EGD), COMBINED N/A 6/20/2022    Procedure: ESOPHAGOGASTRODUODENOSCOPY;  Surgeon: Gavino Reza MD;  Location: [facility]    ESOPHAGOSCOPY, GASTROSCOPY, DUODENOSCOPY (EGD), COMBINED N/A 1/23/2023    Procedure: ESOPHAGOGASTRODUODENOSCOPY (EGD) with esophageal variceal banding;  Surgeon: Juan Boo MD;  Location: [facility]    ESOPHAGOSCOPY, GASTROSCOPY, DUODENOSCOPY (EGD), COMBINED N/A 1/25/2023    Procedure: ESOPHAGOGASTRODUODENOSCOPY (EGD);  Surgeon: Juan Boo MD;  Location: [facility]    ESOPHAGOSCOPY, GASTROSCOPY, DUODENOSCOPY (EGD), COMBINED N/A 1/27/2023    Procedure: Esophagoscopy, gastroscopy, duodenoscopy (EGD), combined;  Surgeon: Osman Montoya MD;  [facility]    ESOPHAGOSCOPY, GASTROSCOPY, DUODENOSCOPY (EGD), COMBINED N/A 2/12/2023    Procedure: ESOPHAGOGASTRODUODENOSCOPY (EGD);  Surgeon: Leventhal, Thomas Michael, MD;  [facility]    IR CVC NON TUNNEL LINE REMOVAL  3/10/2023    IR CVC TUNNEL PLACEMENT > 5 YRS OF AGE  3/10/2023    IR CVC TUNNEL REMOVAL RIGHT  4/4/2023    IR LIVER BIOPSY PERCUTANEOUS  5/18/2023     MIDLINE DOUBLE LUMEN PLACEMENT  2022         PICC TRIPLE LUMEN PLACEMENT  2022         RETURN LIVER TRANSPLANT N/A 3/1/2023    Procedure: RETURN TO OPERATING ROOM, AFTER LIVER TRANSPLANT;  Surgeon: Thelma Sterling MD;  [facility]    TRANSPLANT LIVER RECIPIENT  DONOR N/A 2023    Procedure: Transplant liver recipient  donor;  Surgeon: Thelma Sterling MD;  [facility]      No Known Allergies   Social History     Tobacco Use    Smoking status: Former     Types: Cigarettes, Vaping Device     Passive exposure: Past    Smokeless tobacco: Never    Tobacco comments:     A pack lasted a year, hardly smoked   Substance Use Topics    Alcohol use: Not Currently     Comment: No ETOH since 20      Wt Readings from Last 1 Encounters:   25 79.2 kg (174 lb 9.7 oz)        Anesthesia Evaluation   Pt has had prior anesthetic. Type: General.    No history of anesthetic complications       ROS/MED HX  ENT/Pulmonary:     (+)                tobacco use, Past use,                       Neurologic: Comment: Autism      Cardiovascular:     (+)  hypertension- -   -  - -                                      METS/Exercise Tolerance:     Hematologic:     (+)      anemia,          Musculoskeletal:       GI/Hepatic: Comment: Cirrhosis secondary to ETOH, S/P Liver Transplant 2 years ago    (+)           hepatitis type Alcoholic, liver disease,       Renal/Genitourinary:     (+) renal disease, type: CRI, Pt does not require dialysis,  Pt has history of transplant (2 years ago),         Endo:     (+)  type II DM,   Using insulin,   not previously admitted for DM/DKA.              Psychiatric/Substance Use:     (+) psychiatric history depression alcohol abuse      Infectious Disease:  - neg infectious disease ROS     Malignancy:  - neg malignancy ROS     Other:            Physical Exam    Airway      Comment: Small Mouth    Mallampati: III   TM distance: > 3 FB   Neck ROM: full   Mouth opening: >  "3 cm    Respiratory Devices and Support         Dental           Cardiovascular   cardiovascular exam normal       Rhythm and rate: regular and normal     Pulmonary   pulmonary exam normal        breath sounds clear to auscultation           OUTSIDE LABS:  CBC:   Lab Results   Component Value Date    WBC 5.3 04/16/2025    WBC 5.2 02/24/2025    HGB 11.4 (L) 04/16/2025    HGB 10.2 (L) 02/24/2025    HCT 34.2 (L) 04/16/2025    HCT 30.2 (L) 02/24/2025     04/16/2025     (L) 02/24/2025     BMP:   Lab Results   Component Value Date     04/07/2025     02/24/2025    POTASSIUM 5.8 (H) 04/07/2025    POTASSIUM 4.9 02/24/2025    CHLORIDE 108 (H) 04/07/2025    CHLORIDE 107 02/24/2025    CO2 21 (L) 04/07/2025    CO2 21 (L) 02/24/2025    BUN 31.0 (H) 04/07/2025    BUN 38.2 (H) 02/24/2025    CR 1.30 (H) 04/07/2025    CR 1.36 (H) 02/24/2025     (H) 04/07/2025     (H) 02/24/2025     COAGS:   Lab Results   Component Value Date    PTT 30 03/07/2023    INR 1.02 04/16/2025    FIBR 375 03/07/2023     POC: No results found for: \"BGM\", \"HCG\", \"HCGS\"  HEPATIC:   Lab Results   Component Value Date    ALBUMIN 3.8 02/24/2025    PROTTOTAL 6.5 02/24/2025    ALT 24 02/24/2025    AST 26 02/24/2025    ALKPHOS 310 (H) 02/24/2025    BILITOTAL 0.4 02/24/2025    DESI 11 (L) 03/05/2023     OTHER:   Lab Results   Component Value Date    PH 7.32 (L) 03/04/2023    LACT 0.7 03/06/2023    A1C 5.2 03/12/2023    SARTHAK 9.4 04/07/2025    PHOS 4.0 05/15/2024    MAG 1.8 05/15/2024    LIPASE 12 (L) 02/24/2025    AMYLASE 18 (L) 07/24/2023    TSH 1.09 07/30/2022       Anesthesia Plan    ASA Status:  3    NPO Status:  NPO Appropriate    Anesthesia Type: General.     - Airway: ETT   Induction: Intravenous, Propofol.   Maintenance: Balanced.        Consents    Anesthesia Plan(s) and associated risks, benefits, and realistic alternatives discussed. Questions answered and patient/representative(s) expressed understanding.     - " "Discussed: Risks, Benefits and Alternatives for the PROCEDURE were discussed     - Discussed with:  Patient      - Extended Intubation/Ventilatory Support Discussed: No.      - Patient is DNR/DNI Status: No     Use of blood products discussed: No .     Postoperative Care    Pain management: IV analgesics.   PONV prophylaxis: Ondansetron (or other 5HT-3), Dexamethasone or Solumedrol     Comments:               Wes Winston MD    Clinically Significant Risk Factors Present on Admission                   # Hypertension: Noted on problem list           # Overweight: Estimated body mass index is 29.06 kg/m  as calculated from the following:    Height as of this encounter: 1.651 m (5' 5\").    Weight as of this encounter: 79.2 kg (174 lb 9.7 oz).                "

## 2025-04-16 NOTE — BRIEF OP NOTE
[facility] Brief Operative Note    Pre-operative diagnosis: Encounter for replacement of biliary stent [Z46.89]   Post-operative diagnosis * No post-op diagnosis entered *   Procedure: Procedure(s):  ENDOSCOPIC RETROGRADE CHOLANGIOPANCREATOGRAPHY, WITH REPLACEMENT OF TUBE OR STENT and sludge removal.   Surgeon: Guru Kyle MD   Assistants(s):    Estimated blood loss: None    Specimens: None   Findings:         Previous biliary stent was removed    Anasatmotic stricture vs duct to duct mismatch seen    Two 10 Fr by 9 cm stents were placed    Recommendation    Repeat ERCP in 6 weeks

## 2025-04-23 ENCOUNTER — TELEPHONE (OUTPATIENT)
Dept: GASTROENTEROLOGY | Facility: CLINIC | Age: 30
End: 2025-04-23
Payer: COMMERCIAL

## 2025-04-23 ENCOUNTER — PREP FOR PROCEDURE (OUTPATIENT)
Dept: GASTROENTEROLOGY | Facility: CLINIC | Age: 30
End: 2025-04-23
Payer: COMMERCIAL

## 2025-04-23 ENCOUNTER — CARE COORDINATION (OUTPATIENT)
Dept: GASTROENTEROLOGY | Facility: CLINIC | Age: 30
End: 2025-04-23
Payer: COMMERCIAL

## 2025-04-23 DIAGNOSIS — K83.1 BILIARY STRICTURE (H): Primary | ICD-10-CM

## 2025-04-23 NOTE — PROGRESS NOTES
Post ERCP on 25 with Dr. Wright.      Follow-up recommendations:          - Will repeat ERCP in 6 weeks                          - If patient develops fever, chills, jaundice or                          passes dark urine or has worsening of LFTs before                          scheduled ERCP, will recommend patient to call us                          immediately for consideration of an earlier urgent ERCP     Orders placed:   Please assist in scheduling:     Procedure/Imaging/Clinic: Endoscopic Retrograde Cholangiopancreatography (ERCP)   Physician: Kyle  Timin weeks (approx 25)  Scope time: Provider average   Anesthesia: General  Dx: Biliary stricture  Tier:3  [facility]: [facility]  OK to schedule while attending: Not specified by provider  Patient communication letter header:Endoscopic Retrograde Cholangiopancreatography (ERCP)    Procedure due approximately 25    Med Review     Blood thinner -  None  ASA - None  Diabetic - Insulin   Injectable or oral medications for weight loss - None     Patient Education r/t procedure: Discussion/Mychart.    Message to patient to discuss plan of care. Message to OR scheduling to assist to arrange next procedure.     Marilu Levine RN Care Coordinator

## 2025-04-23 NOTE — TELEPHONE ENCOUNTER
----- Message from Marilu DOOLEY sent at 2025  4:16 PM CDT -----  Regarding: Kyle recall  Kayla Colvin,    Please contact Dillon to arrange their next ERCP with Dr. Wright at [facility]. Dillon's mother does his scheduling.     Timeframe: 6 weeks (approximately 25)    Please send pre-procedure letter via REPP  Please include the following med holds: NOne    [facility] message sent to patient to discuss plan of care, pre-op requirements and medication holds.      Please assist in scheduling:     Procedure/Imaging/Clinic: Endoscopic Retrograde Cholangiopancreatography (ERCP)   Physician: Kyle  Timin weeks (approx 25)  Scope time: Provider average   Anesthesia: General  Dx: Biliary stricture  Tier:3  [facility]: [facility]  OK to schedule while attending: Not specified by provider  Patient communication letter header:Endoscopic Retrograde Cholangiopancreatography (ERCP)    Thank you!     Marilu Levine RN Care Coordinator

## 2025-04-23 NOTE — PROGRESS NOTES
Please assist in scheduling:     Procedure/Imaging/Clinic: Endoscopic Retrograde Cholangiopancreatography (ERCP)   Physician: Kyle  Timin weeks (approx 25)  Scope time: Provider average   Anesthesia: General  Dx: Biliary stricture  Tier:3  [facility]: [facility]  OK to schedule while attending: Not specified by provider  Patient communication letter header:Endoscopic Retrograde Cholangiopancreatography (ERCP)

## 2025-04-26 ENCOUNTER — HEALTH MAINTENANCE LETTER (OUTPATIENT)
Age: 30
End: 2025-04-26

## 2025-05-09 NOTE — PROGRESS NOTES
Addiction Medicine Follow Up    Tele-Visit Details    Type of service:  Video Visit  Time Service Began (time 1st connected with pt): 1105  Time Service Ended (time completely finished with pt): 1118  Originating Location (pt. Location): Patient Rm, [facility]  Distant Location (provider location): [facility] [place] and Addiction Offices  Reason for Televisit: COVID 19  Mode of Communication:  Video Conference via [employer]  Physician has received verbal consent for a video visit from the patient? Yes      Active Problems:    Bleeding gums    Alcoholic cirrhosis of liver with ascites (H)    Anemia due to blood loss, acute    Diabetes mellitus type 2 in obese (H)    Alcohol intoxication (H)    Open abdominal wall wound    Autism    Benign essential hypertension    Anemia of chronic illness    Alcohol withdrawal, uncomplicated (H)      Subjective  Chief complaint: tired    HPI: Dillon is a 27 yo male with severe alcohol use disorder and liver cirrhosis who admitted on 4/6 due to difficulty stopping the bleeding from a minor wound.   Had been jaundiced for about 2 mos and PCP had told him that his liver labs were abnormal, but he continued to drink until the day of admission.   CT shows evidence of liver cirrhosis and labs are consistent with severe liver dysfunction.          Now says he is motivated to stop, but does not want residential CD treatment.    Patient has DM2 and autism spectrum disorder.   Lives with parents.            This morning, patient is tired.   Mother says it is because he was frequently awakened last night.      ROS:   Resp:  Dropped sats to 75% last night.   Denies SOB - currently on 6 L/min nasal O2 witn 97% sat.      CV:  Denies chest pain    GI:   Denies abdominal pain   Ext:  Swelling bilat   Psych:  Nurses have been scoring anxiety at 6 - leading to high CIWA score    Lorazepam  - total of 7 mg yesterday              So far 3 mg today    Current Facility-Administered  Anemia is likely due to acute blood loss which was from GI and Iron deficiency. Most recent hemoglobin and hematocrit are listed below.  Recent Labs     05/07/25  0825 05/08/25  0457 05/09/25  0824   HGB 7.9* 7.7* 7.7*   HCT 28.1* 27.3* 27.5*     Plan  - Monitor serial CBC: Every 6 hours  - Transfuse PRBC if patient becomes hemodynamically unstable, symptomatic or H/H drops below 7/21.  - Patient has received 1 units of PRBCs on 05/05/2025  - Patient's anemia is currently stable  -  ferrlicit x 3 due to CL   "Medications   Medication     albuterol (PROVENTIL) neb solution 2.5 mg     cefTAZidime (FORTAZ) 2 g vial to attach to  ml bag for ADULTS or NS 50 ml bag for PEDS     cloNIDine (CATAPRES) tablet 0.1 mg     glucose gel 15-30 g    Or     dextrose 50 % injection 25-50 mL    Or     glucagon injection 1 mg     glucose gel 15-30 g    Or     dextrose 50 % injection 25-50 mL    Or     glucagon injection 1 mg     [Held by provider] enoxaparin ANTICOAGULANT (LOVENOX) injection 40 mg     flumazenil (ROMAZICON) injection 0.2 mg     FLUoxetine (PROzac) capsule 10 mg     folic acid (FOLVITE) tablet 1 mg     furosemide (LASIX) injection 20 mg     [START ON 4/9/2022] gabapentin (NEURONTIN) capsule 100 mg     gabapentin (NEURONTIN) capsule 300 mg     OLANZapine zydis (zyPREXA) ODT tab 5-10 mg    Or     haloperidol lactate (HALDOL) injection 2.5-5 mg     HYDROmorphone (DILAUDID) injection 0.2 mg     hydrOXYzine (ATARAX) tablet 25 mg     insulin aspart (NovoLOG) injection (RAPID ACTING)     insulin glargine (LANTUS PEN) injection 10 Units     lactulose (CHRONULAC) solution 10 g     lidocaine (LMX4) cream     lidocaine 1 % 0.1-1 mL     LORazepam (ATIVAN) tablet 1-2 mg    Or     LORazepam (ATIVAN) injection 1-2 mg     melatonin tablet 1 mg     multivitamin, therapeutic (THERA-VIT) tablet 1 tablet     pantoprazole (PROTONIX) IV push injection 40 mg     phytonadione (MEPHYTON/VITAMIN K) 1 MG/ML oral solution 5 mg     prochlorperazine (COMPAZINE) injection 5 mg     sodium chloride (PF) 0.9% PF flush 3 mL     sodium chloride (PF) 0.9% PF flush 3 mL     thiamine (B-1) tablet 100 mg       Objective    /73 (BP Location: Left arm, Cuff Size: Adult Small)   Pulse 104   Temp 97.8  F (36.6  C) (Axillary)   Resp 20   Ht 1.676 m (5' 6\")   Wt 71 kg (156 lb 9.6 oz)   SpO2 97%   BMI 25.28 kg/m      Physical Exam this am:   Abd distended, nontender.       Skin:  No sweating   Neuro:  Arouseable but doses off repeatedly while I am " talking to him.   Psych:     Too tired to Cooperative     Mood:  ?               Affect:  Flat               Thought content:  unable to assess               Thought processes:  Linear                Speech:  slurred                Motor:  Normal                Insight/judgement:  ?     Results  This am   T. Bili 12.5, AST 96,    Hgb 7.3 (stable)    Assessment and Plan:  1.   Alcohol Use Disorder, severe -     2.  Acute alcohol withdrawal -  Patient has been reporting that his anxiety is high, being scored at 6/6 on CIWA, leading to elevated CIWA.   Currently, not showing any symptoms, but mother says he is tremulous when he tries to hold something.   Also getting gabapentin and clonidine per protocol.       3. Alcohol- related liver disease with ascites and edema -    Monitor weights, I and O.  Gentle diuresis.   May need paracentesis at some point.        Plan:   Hold clonidine and only give prn if BP elevated   Decrease gabapentin to 100 mg tid   Decrease lorazepam to 1 mg prn for CIWA.   Nurses to assess his ACTUAL anxiety for CIWA,, as patient seems to be exaggerating this        Lyssa Lutz MD  Addiction Medicine Service  [facility]   Page me (click here for Rosalva Lutz)

## 2025-05-14 ENCOUNTER — TELEPHONE (OUTPATIENT)
Dept: TRANSPLANT | Facility: CLINIC | Age: 30
End: 2025-05-14
Payer: COMMERCIAL

## 2025-05-14 ASSESSMENT — ENCOUNTER SYMPTOMS: NEW SYMPTOMS OF CORONARY ARTERY DISEASE: 0

## 2025-05-14 NOTE — TELEPHONE ENCOUNTER
[facility] Call Center    Phone Message    May a detailed message be left on voicemail: yes     Reason for Call: Other: Patients mother called in regarding visit tomorrow. Laura and mother are at hospital due to patients dad having a lung infection and being on life support at the hospital currently. He was rescheduled for the first opening with  which is in Aug. This is supposed to be a 6 month follow up. Please advise if an overbook needs to be completed for patient to be seen sooner.     Action Taken: Message routed to:  Other: RNCC    Travel Screening: Not Applicable     Date of Service:

## 2025-06-21 ENCOUNTER — HOSPITAL ENCOUNTER (EMERGENCY)
Facility: CLINIC | Age: 30
Discharge: HOME | End: 2025-06-21
Payer: MEDICARE

## 2025-06-21 ENCOUNTER — APPOINTMENT (OUTPATIENT)
Dept: RADIOLOGY | Facility: CLINIC | Age: 30
End: 2025-06-21
Payer: MEDICARE

## 2025-06-21 VITALS
TEMPERATURE: 97.2 F | HEART RATE: 89 BPM | HEIGHT: 65 IN | DIASTOLIC BLOOD PRESSURE: 90 MMHG | RESPIRATION RATE: 16 BRPM | WEIGHT: 176 LBS | BODY MASS INDEX: 29.32 KG/M2 | SYSTOLIC BLOOD PRESSURE: 148 MMHG | OXYGEN SATURATION: 94 %
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
## 2025-06-21 DIAGNOSIS — S42.291A OTHER CLOSED DISPLACED FRACTURE OF PROXIMAL END OF RIGHT HUMERUS, INITIAL ENCOUNTER: ICD-10-CM
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
## 2025-06-21 PROCEDURE — 90715 TDAP VACCINE 7 YRS/> IM: CPT

## 2025-06-21 PROCEDURE — 99284 EMERGENCY DEPT VISIT MOD MDM: CPT | Mod: 25

## 2025-06-21 PROCEDURE — 23600 CLTX PROX HUMRL FX W/O MNPJ: CPT | Mod: RT

## 2025-06-21 PROCEDURE — 250N000013 HC RX MED GY IP 250 OP 250 PS 637

## 2025-06-21 PROCEDURE — 250N000011 HC RX IP 250 OP 636

## 2025-06-21 PROCEDURE — 90471 IMMUNIZATION ADMIN: CPT

## 2025-06-21 PROCEDURE — 73030 X-RAY EXAM OF SHOULDER: CPT | Mod: RT

## 2025-06-21 RX ORDER — OXYCODONE HYDROCHLORIDE 5 MG/1
5 TABLET ORAL ONCE
Refills: 0 | Status: DISCONTINUED | OUTPATIENT
Start: 2025-06-21 | End: 2025-06-21

## 2025-06-21 RX ORDER — HYDROCODONE BITARTRATE AND ACETAMINOPHEN 5; 325 MG/1; MG/1
1 TABLET ORAL EVERY 6 HOURS PRN
Qty: 10 TABLET | Refills: 0 | Status: SHIPPED | OUTPATIENT
Start: 2025-06-21 | End: 2025-06-24

## 2025-06-21 RX ORDER — OXYCODONE HYDROCHLORIDE 5 MG/1
5 TABLET ORAL EVERY 6 HOURS PRN
Qty: 10 TABLET | Refills: 0 | Status: SHIPPED | OUTPATIENT
Start: 2025-06-21 | End: 2025-06-24

## 2025-06-21 RX ORDER — ACETAMINOPHEN 500 MG
1000 TABLET ORAL ONCE
Status: COMPLETED | OUTPATIENT
Start: 2025-06-21 | End: 2025-06-21

## 2025-06-21 RX ADMIN — ACETAMINOPHEN 1000 MG: 500 TABLET ORAL at 10:11

## 2025-06-21 RX ADMIN — CLOSTRIDIUM TETANI TOXOID ANTIGEN (FORMALDEHYDE INACTIVATED), CORYNEBACTERIUM DIPHTHERIAE TOXOID ANTIGEN (FORMALDEHYDE INACTIVATED), BORDETELLA PERTUSSIS TOXOID ANTIGEN (GLUTARALDEHYDE INACTIVATED), BORDETELLA PERTUSSIS FILAMENTOUS HEMAGGLUTININ ANTIGEN (FORMALDEHYDE INACTIVATED), BORDETELLA PERTUSSIS PERTACTIN ANTIGEN, AND BORDETELLA PERTUSSIS FIMBRIAE 2/3 ANTIGEN 0.5 ML: 5; 2; 2.5; 5; 3; 5 INJECTION, SUSPENSION INTRAMUSCULAR at 10:12

## 2025-06-21 ASSESSMENT — ACTIVITIES OF DAILY LIVING (ADL): ADLS_ACUITY_SCORE: 53

## 2025-06-21 ASSESSMENT — COLUMBIA-SUICIDE SEVERITY RATING SCALE - C-SSRS
1. IN THE PAST MONTH, HAVE YOU WISHED YOU WERE DEAD OR WISHED YOU COULD GO TO SLEEP AND NOT WAKE UP?: NO
6. HAVE YOU EVER DONE ANYTHING, STARTED TO DO ANYTHING, OR PREPARED TO DO ANYTHING TO END YOUR LIFE?: NO
2. HAVE YOU ACTUALLY HAD ANY THOUGHTS OF KILLING YOURSELF IN THE PAST MONTH?: NO

## 2025-06-21 NOTE — ED PROVIDER NOTES
EMERGENCY DEPARTMENT ENCOUNTER      NAME: Dillon Salter  AGE: 29 year old male  YOB: 1995  MRN: [id number]  EVALUATION DATE & TIME: No admission date for patient encounter.    PCP: Gary Rodrigues    ED PROVIDER: Ally Machuca PA-C      Chief Complaint   Patient presents with    Fall    Arm Pain    Shoulder Pain         FINAL IMPRESSION:  1. Other closed displaced fracture of proximal end of right humerus, initial encounter    2. Fall at home, initial encounter          ED COURSE & MEDICAL DECISION MAKIN:57 AM Met with patient for initial interview. Plan for care discussed.  11:50 AM Reevaluated and updated patient. I discussed the plan for discharge with the patient, and patient is agreeable. We discussed supportive cares at home and reasons for return to the ER including new or worsening symptoms. All questions and concerns addressed. Patient to be discharged by RN.    29 year old male with a history of HTN, alcohol use disorder (in remission) with ascites and varices s/p liver transplant, DM II, autism spectrum disorder who presents to this ED for evaluation of bilateral arm pain s/p mechanical fall.  Patient accompanied by his mother who states that patient's father had witnessed the fall, notes no head injury, loss of consciousness.  She states he is at baseline cognitively.  Upon exam, patient is afebrile, hemodynamically stable, but appears uncomfortable holding his right arm. No significant bony tenderness to palpation, but reproducible right shoulder pain with any ROM. Neurovascularly intact distally. Patient answers questions appropriately and exhibits no focal neurologic deficits. Low suspicion for ICH and using [place] CT Rule and shared decision making with patient and patient's mother, CT Head deferred. XR obtained to assess for fracture, dislocation, which to my independent interpretation revealed fracture of proximal right humerus. Discussed case with ED  physician.     Tetanus updated in ED. Patient was treated with Tylenol and ice upon arrival with minimal pain relief. Patient and patient's mother were made aware of the above findings. Patient placed in sling for immobilization. Discussed option of additional analgesia with patient who reports intolerance to oxycodone, but has tolerated Vicodin in the past. Patient and patient's mother feel safe with this plan as patient does disclose he has struggled with alcohol use in the past - in remission x3 years. Discussed would want transplant team to [place] acetaminophen component, patient states he was told OK to take Tylenol, but should stay away from ibuprofen based medications. Plan to discharge patient home with prescription Norco, strict return precautions, and close follow up with their PCP for reevaluation and ongoing management. The patient was advised to return to the ER if any new or worsening symptoms develop. The patient verbalizes understanding and agrees with the plan.     MIPS (CTPE, Dental pain, Negron, Sinusitis, Asthma/COPD, Head Trauma): Adult Minor Head Trauma:Head CT NOT indicated - no high risk factors    SEPSIS: None    MEDICATIONS GIVEN IN THE EMERGENCY:  Medications   acetaminophen (TYLENOL) tablet 1,000 mg (1,000 mg Oral $Given 6/21/25 1011)   Tdap (tetanus-diphtheria-acell pertussis) (ADACEL) injection 0.5 mL (0.5 mLs Intramuscular $Given 6/21/25 1012)       NEW PRESCRIPTIONS STARTED AT TODAY'S ER VISIT  Discharge Medication List as of 6/21/2025 12:00 PM        START taking these medications    Details   !! oxyCODONE (ROXICODONE) 5 MG tablet Take 1 tablet (5 mg) by mouth every 6 hours as needed for severe pain or breakthrough pain., Disp-10 tablet, R-0, Local Print       !! - Potential duplicate medications found. Please discuss with provider.             =================================================================    HPI    Patient information was obtained from: patient    Use of :  "N/A       Dillon Salter is a 29 year old male with a pertinent history of HTN, alcohol use disorder with ascites and varices s/p liver transplant, DM II autism spectrum disorder who presents to this ED for evaluation of bilateral arm pain s/p mechanical fall.  Patient states that he had excellently tripped over his dog when he was walking to the refrigerator earlier this morning.  He states that he fell on outstretched arms and heard a \"crack\" in his right shoulder.  He reports pain in the bilateral shoulders that radiates down the arm.  He denies any numbness or weakness.  He denies any head injury or loss of consciousness, but states he is not sure if he hit his head.  Patient is accompanied by his mother who reports patient's father had witnessed the fall and reports no head injury or loss of consciousness.  Patient denies any vomiting since the fall, blood thinners, alcohol use, headache, vision changes, gait instability, speech difficulty.  Patient is accompanied by his mother who reports that he is at baseline cognitively.  He denies any other musculoskeletal complaints, neck or back pain, chest pain, shortness of breath, abdominal pain. He is accompanied by his mother who reports a nonhealing wound to his abdomen in the spot where he injects his insulin.  She states that this has been present for the last couple of months.  She states that they have been using.  Peroxide and ointment on it daily without any healing.  Patient denies any puslike drainage, swelling, surrounding redness, fevers or chills.      REVIEW OF SYSTEMS   Review of Systems See HPI    PAST MEDICAL HISTORY:  Past Medical History:   Diagnosis Date    Acute on chronic anemia 04/08/2022    Alcohol use disorder     Alcohol use disorder, severe, dependence (H) 07/11/2022    Alcoholic cirrhosis of liver with ascites (H)     Alcoholic hepatitis (H)     Autism spectrum disorder 04/08/2022    High functioning autistic.  26-year-old history of " autism/Asperger's on the spectrum graduated high school at [employer] worked at  and then another  place until the Covid epidemic in 2020 lives with parents (Leticia and Wes) socially isolated drinks alcohol beer daily     Benign essential hypertension     Bleeding esophageal varices (H) 06/18/2022    Hepatic encephalopathy (H)     Hyponatremia 07/28/2022    Major depressive disorder     Neuropathic pain     Status post liver transplantation (H) 02/28/2023    Type II Diabetes 2012       PAST SURGICAL HISTORY:  Past Surgical History:   Procedure Laterality Date    BENCH LIVER  2/28/2023    Procedure: Bench liver;  Surgeon: Thelma Sterling MD;  [facility]    COLONOSCOPY N/A 1/27/2023    Procedure: Colonoscopy;  Surgeon: Osman Montoya MD;  [facility]    ENDOSCOPIC RETROGRADE CHOLANGIOPANCREATOGRAM N/A 5/24/2023    Procedure: Endoscopic retrograde cholangiopancreatogram. Biliary sphincterotomy, dilation, abnd stent placement;  Surgeon: Jarrell Mullins MD;  [facility]    ENDOSCOPIC RETROGRADE CHOLANGIOPANCREATOGRAM N/A 2/24/2025    Procedure: ENDOSCOPIC RETROGRADE CHOLANGIOPANCREATOGRAPHY with biliary sludge removal, dilation and stent placement;  Surgeon: Guru Adeline Wright MD;  [facility]    ENDOSCOPIC RETROGRADE CHOLANGIOPANCREATOGRAPHY, EXCHANGE TUBE/STENT N/A 7/24/2023    Procedure: ENDOSCOPIC RETROGRADE CHOLANGIOPANCREATOGRAPHY, WITH REPLACEMENT OF STENT, stone and sludge removal;  Surgeon: Guru Adeline Wright MD;  [facility]    ENDOSCOPIC RETROGRADE CHOLANGIOPANCREATOGRAPHY, EXCHANGE TUBE/STENT N/A 11/8/2023    Procedure: ENDOSCOPIC RETROGRADE CHOLANGIOPANCREATOGRAPHY, WITH REPLACEMENT OF TUBE OR STENT, sludge removal, and biliary duct dilation.;  Surgeon: Guru Adeline Wright MD;  [facility]    ENDOSCOPIC RETROGRADE CHOLANGIOPANCREATOGRAPHY, EXCHANGE TUBE/STENT N/A 4/16/2025    Procedure: ENDOSCOPIC RETROGRADE  CHOLANGIOPANCREATOGRAPHY, WITH REPLACEMENT OF TUBE OR STENT and sludge removal.;  Surgeon: Guru Adeline Wright MD;  [facility]    ENDOSCOPIC ULTRASOUND LOWER GASTROINTESTIONAL TRACT (GI) N/A 1/27/2023    Procedure: ULTRASOUND, LOWER GI TRACT, ENDOSCOPIC with glueing;  Surgeon: Osman Montoya MD;  [facility]    ESOPHAGOSCOPY, GASTROSCOPY, DUODENOSCOPY (EGD), COMBINED N/A 5/24/2022    Procedure: ESOPHAGOGASTRODUODENOSCOPY (EGD) with esophageal banding;  Surgeon: Gary Hurst MD;  Location: [facility]    ESOPHAGOSCOPY, GASTROSCOPY, DUODENOSCOPY (EGD), COMBINED N/A 6/20/2022    Procedure: ESOPHAGOGASTRODUODENOSCOPY;  Surgeon: Gavino Reza MD;  Location: [facility]    ESOPHAGOSCOPY, GASTROSCOPY, DUODENOSCOPY (EGD), COMBINED N/A 1/23/2023    Procedure: ESOPHAGOGASTRODUODENOSCOPY (EGD) with esophageal variceal banding;  Surgeon: Juan Boo MD;  Location: [facility]    ESOPHAGOSCOPY, GASTROSCOPY, DUODENOSCOPY (EGD), COMBINED N/A 1/25/2023    Procedure: ESOPHAGOGASTRODUODENOSCOPY (EGD);  Surgeon: Juan Boo MD;  Location: [facility]    ESOPHAGOSCOPY, GASTROSCOPY, DUODENOSCOPY (EGD), COMBINED N/A 1/27/2023    Procedure: Esophagoscopy, gastroscopy, duodenoscopy (EGD), combined;  Surgeon: Osman Montoya MD;  [facility]    ESOPHAGOSCOPY, GASTROSCOPY, DUODENOSCOPY (EGD), COMBINED N/A 2/12/2023    Procedure: ESOPHAGOGASTRODUODENOSCOPY (EGD);  Surgeon: Leventhal, Thomas Michael, MD;  [facility]    IR CVC NON TUNNEL LINE REMOVAL  3/10/2023    IR CVC TUNNEL PLACEMENT > 5 YRS OF AGE  3/10/2023    IR CVC TUNNEL REMOVAL RIGHT  4/4/2023    IR LIVER BIOPSY PERCUTANEOUS  5/18/2023    MIDLINE DOUBLE LUMEN PLACEMENT  5/26/2022         PICC TRIPLE LUMEN PLACEMENT  7/28/2022         RETURN LIVER TRANSPLANT N/A 3/1/2023    Procedure: RETURN TO OPERATING ROOM, AFTER LIVER TRANSPLANT;  Surgeon: Thelma Sterling MD;  [facility]     TRANSPLANT LIVER RECIPIENT  DONOR N/A 2023    Procedure: Transplant liver recipient  donor;  Surgeon: Thelma Sterling MD;  Location:  OR           CURRENT MEDICATIONS:    HYDROcodone-acetaminophen (NORCO) 5-325 MG tablet  oxyCODONE (ROXICODONE) 5 MG tablet  acetaminophen (TYLENOL) 325 MG tablet  FLUoxetine (PROZAC) 20 MG capsule  gabapentin (NEURONTIN) 100 MG capsule  insulin aspart (NOVOLOG FLEXPEN) 100 UNIT/ML pen  insulin glargine (LANTUS PEN) 100 UNIT/ML pen  magnesium glycinate 100 MG CAPS capsule  magnesium oxide (MAG-OX) 400 MG tablet  metoprolol tartrate (LOPRESSOR) 25 MG tablet  multivitamin w/minerals (THERA-VIT-M) tablet  oxyCODONE (ROXICODONE) 5 MG tablet  tacrolimus (GENERIC EQUIVALENT) 0.5 MG capsule  tacrolimus (GENERIC EQUIVALENT) 1 MG capsule  ursodiol (ACTIGALL) 300 MG capsule  Vitamin D3 (CHOLECALCIFEROL) 25 mcg (1000 units) tablet        ALLERGIES:  No Known Allergies    FAMILY HISTORY:  Family History   Problem Relation Age of Onset    Hypertension Mother     Substance Abuse Mother     Thyroid Disease Mother     Heart Failure Father     Substance Abuse Father     Chronic Obstructive Pulmonary Disease Father     Substance Abuse Maternal Grandfather     Deep Vein Thrombosis (DVT) Paternal Uncle     Anesthesia Reaction No family hx of        SOCIAL HISTORY:   Social History     Socioeconomic History    Marital status: Single   Tobacco Use    Smoking status: Former     Types: Cigarettes, Vaping Device     Passive exposure: Past    Smokeless tobacco: Never    Tobacco comments:     A pack lasted a year, hardly smoked   Vaping Use    Vaping status: Former   Substance and Sexual Activity    Alcohol use: Not Currently     Comment: No ETOH since 20    Drug use: Not Currently     Types: Marijuana     Comment: drinks   Social History Narrative    26-year-old history of autism/Asperger's on the spectrum graduated high school at [employer] worked at [employer] and then another   "place until the Covid epidemic in 2020 lives with parents (Stephanie) socially isolated drinks alcohol beer daily        End-stage cirrhosis noted on admission to  April 2022     Social Drivers of Health     Interpersonal Safety: Unknown (4/16/2025)    Interpersonal Safety     Do you feel physically and emotionally safe where you currently live?: Patient unable to answer     Within the past 12 months, have you been hit, slapped, kicked or otherwise physically hurt by someone?: Patient unable to answer     Within the past 12 months, have you been humiliated or emotionally abused in other ways by your partner or ex-partner?: Patient unable to answer       VITALS:  BP (!) 148/90   Pulse 89   Temp 97.2  F (36.2  C) (Temporal)   Resp 16   Ht 1.651 m (5' 5\")   Wt 79.8 kg (176 lb)   SpO2 94%   BMI 29.29 kg/m      PHYSICAL EXAM    Constitutional:  Alert, in no acute distress. Cooperative.  EYES: Conjunctivae clear. PERRL. EOM intact. Peripheral fields intact.  HENT:  Normocephalic. Oropharynx clear. Moist membranes. Tongue without deviation.  Respiratory:  Respirations even, unlabored, in no acute respiratory distress. No cough. Speaks in full sentences easily.  Cardiovascular:  Regular rate, good peripheral perfusion.   GI: Soft, flat, non-distended.  Musculoskeletal: No significant bony tenderness to palpation of upper extremities, but reproducible pain in shoulder with any ROM. No overlying erythema, warmth, purulence, fluctuance, edema, ecchymosis, crepitus, or obvious bony deformity. ROM limited at shoulders bilaterally secondary to pain. Neurovascularly intact distally. Cap refill <2 seconds. 2+ radial pulses bilaterally. 5/5 strength. Compartments soft.  Integument: Warm, Dry. Superficial abrasions to right knee without active bleeding. 1 cm abrasion to lower abdomen, no active bleeding. No surrounding erythema, warmth, purulence, fluctuance, edema, crepitus, or tenderness to palpation.   Neurologic:  " Alert & oriented x4. No focal deficits noted. GCS 15. Sensation intact. Motor intact. Coordination intact. 5/5 strength.   Psych: Normal mood and affect.      LAB:  All pertinent labs reviewed and interpreted.  Results for orders placed or performed during the hospital encounter of 06/21/25   XR Shoulder Right G/E 3 Views    Impression    IMPRESSION: Acute minimally displaced right proximal humerus fracture greater tuberosity. Probable nondisplaced transverse component through the surgical neck where there is a faint linear lucency. No dislocation or joint space narrowing.    NOTE: ABNORMAL REPORT    THE DICTATION ABOVE DESCRIBES AN ABNORMALITY FOR WHICH FOLLOW-UP IS NEEDED.    XR Shoulder Left G/E 3 Views    Impression    IMPRESSION: No evidence of an acute displaced fracture or dislocation. Joint spaces are maintained.       RADIOLOGY:  Reviewed all pertinent imaging. Please see official radiology report.  XR Shoulder Left G/E 3 Views   Final Result   IMPRESSION: No evidence of an acute displaced fracture or dislocation. Joint spaces are maintained.      XR Shoulder Right G/E 3 Views   Final Result   IMPRESSION: Acute minimally displaced right proximal humerus fracture greater tuberosity. Probable nondisplaced transverse component through the surgical neck where there is a faint linear lucency. No dislocation or joint space narrowing.      NOTE: ABNORMAL REPORT      THE DICTATION ABOVE DESCRIBES AN ABNORMALITY FOR WHICH FOLLOW-UP IS NEEDED.         Ally Machuca PA-C  [facility] EMERGENCY ROOM  [address] [id number] [phone]      Ally Machuca PA-C  06/21/25 1636

## 2025-06-21 NOTE — DISCHARGE INSTRUCTIONS
Rest, ice, keep sling in place until you see orthopedics. You may use topical Icy Hot or Lidoderm as needed. You may use ibuprofen and/or Tylenol as needed for pain - as long as OK'd by your transplant team.    You may take oxycodone as needed for severe pain - do NOT drive on this medication as it can cause drowsiness. Additionally, please take a stool softener as this medication can cause constipation. This medication can be addicting, please limit your use and discard any remaining tablets.     Tylenol (Acetaminophen) Discharge Instructions:  You may take over-the-counter, Tylenol (acetaminophen) every 4-6 hours as needed for pain.  Take no more than 4000 mg of Tylenol in a 24-hour period for adults, maximum dosing for children is based on weight - please look at medication label.    Avoid taking more than 1 acetaminophen-containing product at a time and be aware that many over-the-counter medications contain a combination of acetaminophen and other products.  If you are taking Tylenol in addition to a combination product please keep track of your daily acetaminophen dose to make sure you do not exceed the recommended 4000 mg.  Taking too much acetaminophen can cause permanent damage to your liver.    Ibuprofen/Naproxen Discharge Instructions:  You may take ibuprofen (600 mg) every 6 hours as needed for pain control.  The maximum dose of (ibuprofen is 3200 mg ) in a 24-hour period for adults, maximum dosing for children is based on weight - please look at medication label.     Take this medication with food to prevent stomach irritation.  With long-term use this medication can irritate the stomach causing pain and lead to development of a stomach ulcer.  If you notice stomach pain or vomiting of coffee-ground colored vomit or blood, please be seen by a healthcare provider.  Attempt to use this medication for the shortest time possible.      Follow-up with orthopedics for reevaluation and ongoing management -  referral placed today.    Return to the emergency department for any new or worsening symptoms including increased pain, redness/warmth/drainage/swelling, fever/chills, new weakness, numbness/tingling, decreased range of motion, cold extremity, or any other concerning symptoms.     Take Care!  - Ally Machuca PA-C

## 2025-06-21 NOTE — ED TRIAGE NOTES
Pt states was walking to refrigerator and tripped over dog and fell on  both arms and heard some cracks and can't move arms since and having a lot of pain. Pt holding right arm in triage, c/o pain to upper right arm and goes down right arm and also pain to left upper arm down left arm, right shoulder pain . Does not think he hit his head, denies thinners. Took motrin this am without relief.

## 2025-06-25 ENCOUNTER — OFFICE VISIT (OUTPATIENT)
Dept: ORTHOPEDICS | Facility: CLINIC | Age: 30
End: 2025-06-25
Payer: MEDICARE
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
## 2025-06-25 DIAGNOSIS — S42.291A OTHER CLOSED DISPLACED FRACTURE OF PROXIMAL END OF RIGHT HUMERUS, INITIAL ENCOUNTER: Primary | ICD-10-CM

## 2025-06-25 PROCEDURE — 99204 OFFICE O/P NEW MOD 45 MIN: CPT | Performed by: STUDENT IN AN ORGANIZED HEALTH CARE EDUCATION/TRAINING PROGRAM

## 2025-06-25 RX ORDER — HYDROCODONE BITARTRATE AND ACETAMINOPHEN 5; 325 MG/1; MG/1
1 TABLET ORAL EVERY 6 HOURS PRN
Qty: 20 TABLET | Refills: 0 | Status: SHIPPED | OUTPATIENT
Start: 2025-06-25

## 2025-06-25 NOTE — LETTER
6/25/2025      Dillon Salter  [address] [place] [address]      Dear Colleague,    Thank you for referring your patient, Dillon Salter, to the [facility]. Please see a copy of my visit note below.    ASSESSMENT & PLAN    Dillon was seen today for pain and pain.    Diagnoses and all orders for this visit:    Other closed displaced fracture of proximal end of right humerus, initial encounter  -     Orthopedic  Referral  -     Sports Med Adult Follow-Up Clinic Order (As Needed); Future  -     HYDROcodone-acetaminophen (NORCO) 5-325 MG tablet; Take 1 tablet by mouth every 6 hours as needed for severe pain.  -     Physical Therapy  Referral; Future    Fall at home, initial encounter  -     Orthopedic  Referral  -     Sports Med Adult Follow-Up Clinic Order (As Needed); Future  -     HYDROcodone-acetaminophen (NORCO) 5-325 MG tablet; Take 1 tablet by mouth every 6 hours as needed for severe pain.  -     Physical Therapy  Referral; Future      This issue is acute and Unchanged. Dillon presents our clinic today to discuss his acute right arm pain.  Unfortunately, the patient several days ago suffered a fall landing on his right shoulder.  He was seen in the emergency department where radiographs were taken that showed a minimally displaced fracture of the greater tuberosity of the right proximal humerus.  He was appropriately placed in a sling at that time and has been wearing this at all times.  We reviewed the nature of this fracture and that while it we will heal with conservative treatment did not need surgical intervention, because of the nature of this fracture it is possible he may end up with some stiffness and pain at end ranges of motion.  We discussed the need for constant wearing of the sling for at least the first 6 weeks to allow this area to heal.  We discussed the physical therapy rehab protocol and a copy  of this was given to the patient.  Given the patient's significant pain and limited ability to take NSAIDs due to his liver transplant, a short course of narcotic pain medication was prescribed for the patient.  We discussed that this would be a short-term medication and no refills will be provided.  We determined the following plan:  - Patient will continue to wear the splint at all times.  - Physical therapy referral placed  - Norco 5-3 25 sent to pharmacy  - He will follow-up with me in 2 weeks with repeat radiographs at that time to ensure stability of the fracture.    Ryder Perez, [facility]    -----  Chief Complaint   Patient presents with     Right Shoulder - Pain     Left Shoulder - Pain       SUBJECTIVE  Dillon Salter is a/an 29 year old male who is seen in consultation at the request of  Ally Machuca PA-C for evaluation of bilateral shoulder pain.     The patient is seen with their father and with their mother.  The patient is Right handed    Onset: 4 day(s) ago. Patient describes injury as due to a fall landing with his arms out  Location of Pain: right lateral shoulder, left shoulder and elbow diffusely  Worsened by: movement  Better with: sling, rest  Treatments tried: Tylenol  Associated symptoms: mild bruising    Orthopedic/Surgical history: YES - Date: Transplant - NSAID contraindication  Social History/Occupation: Not working - disability      REVIEW OF SYSTEMS:  Review of systems negative unless mentioned in HPI     OBJECTIVE:  There were no vitals taken for this visit.   General: healthy, alert and in no distress  Skin: no suspicious lesions or rash.  CV: distal perfusion intact   Resp: normal respiratory effort without conversational dyspnea   Psych: normal mood and affect  Gait: NORMAL  Neuro: Normal light sensory exam of RU extremity        RADIOLOGY:  Final results and radiologist's interpretation, available in the [facility]  record.  Images were reviewed with the patient in the office today.  My personal interpretation of the performed imaging: Radiographs taken in the emergency department on 6/21/2025 reviewed and show minimally displaced fracture of the proximal humerus through the greater tuberosity.  There is a possible transverse component through the surgical neck.  No degenerative changes.           Again, thank you for allowing me to participate in the care of your patient.        Sincerely,        Ryder Perez, DO    Electronically signed

## 2025-06-25 NOTE — PROGRESS NOTES
ASSESSMENT & PLAN    Dillon was seen today for pain and pain.    Diagnoses and all orders for this visit:    Other closed displaced fracture of proximal end of right humerus, initial encounter  -     Orthopedic  Referral  -     [facility] Adult Follow-Up Clinic Order (As Needed); Future  -     HYDROcodone-acetaminophen (NORCO) 5-325 MG tablet; Take 1 tablet by mouth every 6 hours as needed for severe pain.  -     Physical Therapy  Referral; Future    Fall at home, initial encounter  -     Orthopedic  Referral  -     [facility] Adult Follow-Up Clinic Order (As Needed); Future  -     HYDROcodone-acetaminophen (NORCO) 5-325 MG tablet; Take 1 tablet by mouth every 6 hours as needed for severe pain.  -     Physical Therapy  Referral; Future      This issue is acute and Unchanged. Dillon presents our clinic today to discuss his acute right arm pain.  Unfortunately, the patient several days ago suffered a fall landing on his right shoulder.  He was seen in the emergency department where radiographs were taken that showed a minimally displaced fracture of the greater tuberosity of the right proximal humerus.  He was appropriately placed in a sling at that time and has been wearing this at all times.  We reviewed the nature of this fracture and that while it we will heal with conservative treatment did not need surgical intervention, because of the nature of this fracture it is possible he may end up with some stiffness and pain at end ranges of motion.  We discussed the need for constant wearing of the sling for at least the first 6 weeks to allow this area to heal.  We discussed the physical therapy rehab protocol and a copy of this was given to the patient.  Given the patient's significant pain and limited ability to take NSAIDs due to his liver transplant, a short course of narcotic pain medication was prescribed for the patient.  We discussed that this would be a short-term  medication and no refills will be provided.  We determined the following plan:  - Patient will continue to wear the splint at all times.  - Physical therapy referral placed  - Norco 5-3 25 sent to pharmacy  - He will follow-up with me in 2 weeks with repeat radiographs at that time to ensure stability of the fracture.    Ryder Perez DO  [facility] SPORTS MEDICINE [facility] [place]    -----  Chief Complaint   Patient presents with    Right Shoulder - Pain    Left Shoulder - Pain       SUBJECTIVE  Dillon Salter is a/an 29 year old male who is seen in consultation at the request of  Ally Machuca PA-C for evaluation of bilateral shoulder pain.     The patient is seen with their father and with their mother.  The patient is Right handed    Onset: 4 day(s) ago. Patient describes injury as due to a fall landing with his arms out  Location of Pain: right lateral shoulder, left shoulder and elbow diffusely  Worsened by: movement  Better with: sling, rest  Treatments tried: Tylenol  Associated symptoms: mild bruising    Orthopedic/Surgical history: YES - Date: Transplant - NSAID contraindication  Social History/Occupation: Not working - disability      REVIEW OF SYSTEMS:  Review of systems negative unless mentioned in HPI     OBJECTIVE:  There were no vitals taken for this visit.   General: healthy, alert and in no distress  Skin: no suspicious lesions or rash.  CV: distal perfusion intact   Resp: normal respiratory effort without conversational dyspnea   Psych: normal mood and affect  Gait: NORMAL  Neuro: Normal light sensory exam of RU extremity        RADIOLOGY:  Final results and radiologist's interpretation, available in the [facility] health record.  Images were reviewed with the patient in the office today.  My personal interpretation of the performed imaging: Radiographs taken in the emergency department on 6/21/2025 reviewed and show minimally displaced fracture of the proximal humerus through the  greater tuberosity.  There is a possible transverse component through the surgical neck.  No degenerative changes.

## 2025-06-27 ENCOUNTER — LAB (OUTPATIENT)
Dept: LAB | Facility: CLINIC | Age: 30
End: 2025-06-27
Payer: MEDICARE

## 2025-06-27 ENCOUNTER — RESULTS FOLLOW-UP (OUTPATIENT)
Dept: GASTROENTEROLOGY | Facility: CLINIC | Age: 30
End: 2025-06-27

## 2025-06-27 DIAGNOSIS — Z94.4 LIVER REPLACED BY TRANSPLANT (H): ICD-10-CM

## 2025-06-27 LAB
ALBUMIN SERPL BCG-MCNC: 4 G/DL (ref 3.5–5.2)
ALP SERPL-CCNC: 277 U/L (ref 40–150)
ALT SERPL W P-5'-P-CCNC: 20 U/L (ref 0–70)
ANION GAP SERPL CALCULATED.3IONS-SCNC: 8 MMOL/L (ref 7–15)
AST SERPL W P-5'-P-CCNC: 21 U/L (ref 0–45)
BILIRUB SERPL-MCNC: 0.8 MG/DL
BILIRUBIN DIRECT (ROCHE PRO & PURE): 0.37 MG/DL (ref 0–0.45)
BUN SERPL-MCNC: 37.9 MG/DL (ref 6–20)
CALCIUM SERPL-MCNC: 9.5 MG/DL (ref 8.8–10.4)
CHLORIDE SERPL-SCNC: 96 MMOL/L (ref 98–107)
CREAT SERPL-MCNC: 1.46 MG/DL (ref 0.67–1.17)
EGFRCR SERPLBLD CKD-EPI 2021: 66 ML/MIN/1.73M2
ERYTHROCYTE [DISTWIDTH] IN BLOOD BY AUTOMATED COUNT: 12.7 % (ref 10–15)
GLUCOSE SERPL-MCNC: 481 MG/DL (ref 70–99)
HCO3 SERPL-SCNC: 27 MMOL/L (ref 22–29)
HCT VFR BLD AUTO: 29.1 % (ref 40–53)
HGB BLD-MCNC: 10.2 G/DL (ref 13.3–17.7)
MCH RBC QN AUTO: 30.4 PG (ref 26.5–33)
MCHC RBC AUTO-ENTMCNC: 35.1 G/DL (ref 31.5–36.5)
MCV RBC AUTO: 87 FL (ref 78–100)
PLATELET # BLD AUTO: 158 10E3/UL (ref 150–450)
POTASSIUM SERPL-SCNC: 6.2 MMOL/L (ref 3.4–5.3)
PROT SERPL-MCNC: 6.5 G/DL (ref 6.4–8.3)
RBC # BLD AUTO: 3.35 10E6/UL (ref 4.4–5.9)
SODIUM SERPL-SCNC: 131 MMOL/L (ref 135–145)
TACROLIMUS BLD-MCNC: 9.8 UG/L (ref 5–15)
TME LAST DOSE: NORMAL H
TME LAST DOSE: NORMAL H
WBC # BLD AUTO: 5.9 10E3/UL (ref 4–11)

## 2025-06-27 PROCEDURE — 80197 ASSAY OF TACROLIMUS: CPT

## 2025-06-27 PROCEDURE — 85014 HEMATOCRIT: CPT

## 2025-06-27 PROCEDURE — 82310 ASSAY OF CALCIUM: CPT

## 2025-06-27 PROCEDURE — 36415 COLL VENOUS BLD VENIPUNCTURE: CPT

## 2025-06-27 PROCEDURE — 82248 BILIRUBIN DIRECT: CPT

## 2025-06-30 ENCOUNTER — TELEPHONE (OUTPATIENT)
Dept: TRANSPLANT | Facility: CLINIC | Age: 30
End: 2025-06-30
Payer: MEDICARE

## 2025-06-30 ENCOUNTER — LAB (OUTPATIENT)
Dept: LAB | Facility: CLINIC | Age: 30
End: 2025-06-30
Payer: MEDICARE

## 2025-06-30 ENCOUNTER — RESULTS FOLLOW-UP (OUTPATIENT)
Dept: TRANSPLANT | Facility: CLINIC | Age: 30
End: 2025-06-30

## 2025-06-30 DIAGNOSIS — E87.5 HYPERKALEMIA: ICD-10-CM

## 2025-06-30 LAB
ANION GAP SERPL CALCULATED.3IONS-SCNC: 12 MMOL/L (ref 7–15)
BUN SERPL-MCNC: 35.7 MG/DL (ref 6–20)
CALCIUM SERPL-MCNC: 9.4 MG/DL (ref 8.8–10.4)
CHLORIDE SERPL-SCNC: 101 MMOL/L (ref 98–107)
CREAT SERPL-MCNC: 1.45 MG/DL (ref 0.67–1.17)
EGFRCR SERPLBLD CKD-EPI 2021: 66 ML/MIN/1.73M2
GLUCOSE SERPL-MCNC: 223 MG/DL (ref 70–99)
HCO3 SERPL-SCNC: 24 MMOL/L (ref 22–29)
POTASSIUM SERPL-SCNC: 4.8 MMOL/L (ref 3.4–5.3)
SODIUM SERPL-SCNC: 137 MMOL/L (ref 135–145)

## 2025-06-30 PROCEDURE — 36415 COLL VENOUS BLD VENIPUNCTURE: CPT

## 2025-06-30 PROCEDURE — 80048 BASIC METABOLIC PNL TOTAL CA: CPT

## 2025-06-30 NOTE — TELEPHONE ENCOUNTER
K on Friday was 6.2, result was paged out to GI fellow who discussed with Dr. Wood. They started Dillon on florinef and requested repeat BMP today.

## 2025-07-08 SDOH — HEALTH STABILITY: PHYSICAL HEALTH: ON AVERAGE, HOW MANY MINUTES DO YOU ENGAGE IN EXERCISE AT THIS LEVEL?: 20 MIN

## 2025-07-08 SDOH — HEALTH STABILITY: PHYSICAL HEALTH: ON AVERAGE, HOW MANY DAYS PER WEEK DO YOU ENGAGE IN MODERATE TO STRENUOUS EXERCISE (LIKE A BRISK WALK)?: 1 DAY

## 2025-07-09 ENCOUNTER — OFFICE VISIT (OUTPATIENT)
Dept: ORTHOPEDICS | Facility: CLINIC | Age: 30
End: 2025-07-09
Attending: STUDENT IN AN ORGANIZED HEALTH CARE EDUCATION/TRAINING PROGRAM
Payer: COMMERCIAL

## 2025-07-09 ENCOUNTER — ANCILLARY PROCEDURE (OUTPATIENT)
Dept: GENERAL RADIOLOGY | Facility: CLINIC | Age: 30
End: 2025-07-09
Attending: STUDENT IN AN ORGANIZED HEALTH CARE EDUCATION/TRAINING PROGRAM
Payer: COMMERCIAL

## 2025-07-09 DIAGNOSIS — S42.201A CLOSED FRACTURE OF RIGHT PROXIMAL HUMERUS: ICD-10-CM

## 2025-07-09 DIAGNOSIS — S42.294D OTHER CLOSED NONDISPLACED FRACTURE OF PROXIMAL END OF RIGHT HUMERUS WITH ROUTINE HEALING, SUBSEQUENT ENCOUNTER: ICD-10-CM

## 2025-07-09 PROCEDURE — 73060 X-RAY EXAM OF HUMERUS: CPT | Mod: TC | Performed by: RADIOLOGY

## 2025-07-09 PROCEDURE — G2211 COMPLEX E/M VISIT ADD ON: HCPCS | Performed by: STUDENT IN AN ORGANIZED HEALTH CARE EDUCATION/TRAINING PROGRAM

## 2025-07-09 PROCEDURE — 99213 OFFICE O/P EST LOW 20 MIN: CPT | Performed by: STUDENT IN AN ORGANIZED HEALTH CARE EDUCATION/TRAINING PROGRAM

## 2025-07-09 NOTE — LETTER
7/9/2025      Dillon Salter  [address] [place] [address]      Dear Colleague,    Thank you for referring your patient, Dillon Salter, to the [facility] SPORTS MEDICINE CLINIC [facility]. Please see a copy of my visit note below.    ASSESSMENT & PLAN    Dillon was seen today for follow up and pain.    Diagnoses and all orders for this visit:    Other closed nondisplaced fracture of proximal end of right humerus with routine healing, subsequent encounter  -     Sports Med Adult Follow-Up Clinic Order (As Needed)  -     XR Humerus RT G/E 2 vw; Future      This issue is acute and Improving. Dillon presents our clinic today to follow-up on his known fracture of the proximal right humerus.  He has been wearing the sling at all times and has been tolerating it well.  Radiographs taken in clinic today reviewed with the patient and show stable appearance of the fracture without evidence of definitive healing.  On examination, he is resting comfortably in the sling and his distal extremity is neurovascularly intact.  We discussed the need for continued immobilization in the sling to allow this area to heal.  We determined the following plan:  - Patient will continue to wear the sling at all times  - Proximal humerus nonoperative protocol was printed to patient and they were instructed to bring this to physical therapy  - They will follow-up with me in 1 month with repeat radiographs at that time    The longitudinal plan of care for the diagnosis(es)/condition(s) as documented were addressed during this visit. Due to the added complexity in care, I will continue to support Dillon in the subsequent management and with ongoing continuity of care.      Ryder Perez,   [facility] MEDICINE [facility] [place]    SUBJECTIVE- Interim History July 9, 2025    Chief Complaint   Patient presents with     Right Shoulder - Follow Up, Pain       Dillon Salter is a 29 year old  male who is seen in f/u up for Other closed nondisplaced fracture of proximal end of right humerus with routine healing, subsequent encounter. Since last visit on 6/25/25 patient has scheduled physical therapy (7/28/25).  - Now ~ 2.5 weeks from initial onset    Worsened by: movement (is very cautious with movement)  Better with: sling, medications  Treatments tried: other medications: Hydrocodone/Acetaminophen (Vicodin/Norco) and sling use  Associated symptoms:  numbness and tingling    The patient is seen with their mother.  The patient is Right handed    Orthopedic/Surgical history: YES - Date: Transplant - NSAID contraindication   Social History/Occupation: Not working due to being on disability      REVIEW OF SYSTEMS:  Negative unless mentioned above    OBJECTIVE:  There were no vitals taken for this visit.   General: healthy, alert and in no distress  Skin: no suspicious lesions or rash.  CV: distal perfusion intact   Resp: normal respiratory effort without conversational dyspnea   Psych: normal mood and affect  Gait: NORMAL  Neuro: Normal light sensory exam of RU extremity     Patient resting comfortably in sling    RADIOLOGY:  Final results and radiologist's interpretation, available in the [facility] health record.  Images were reviewed with the patient in the office today.  My personal interpretation of the performed imaging: No change in alignment of the known fracture of the greater tuberosity of the proximal humerus.  No definitive evidence of healing seen.  No new bony abnormalities.           Again, thank you for allowing me to participate in the care of your patient.        Sincerely,        Ryder Perez, DO    Electronically signed

## 2025-07-09 NOTE — PROGRESS NOTES
ASSESSMENT & PLAN    Dillon was seen today for follow up and pain.    Diagnoses and all orders for this visit:    Other closed nondisplaced fracture of proximal end of right humerus with routine healing, subsequent encounter  -     Sports Med Adult Follow-Up Clinic Order (As Needed)  -     XR Humerus RT G/E 2 vw; Future      This issue is acute and Improving. Dillon presents our clinic today to follow-up on his known fracture of the proximal right humerus.  He has been wearing the sling at all times and has been tolerating it well.  Radiographs taken in clinic today reviewed with the patient and show stable appearance of the fracture without evidence of definitive healing.  On examination, he is resting comfortably in the sling and his distal extremity is neurovascularly intact.  We discussed the need for continued immobilization in the sling to allow this area to heal.  We determined the following plan:  - Patient will continue to wear the sling at all times  - Proximal humerus nonoperative protocol was printed to patient and they were instructed to bring this to physical therapy  - They will follow-up with me in 1 month with repeat radiographs at that time    The longitudinal plan of care for the diagnosis(es)/condition(s) as documented were addressed during this visit. Due to the added complexity in care, I will continue to support Dillon in the subsequent management and with ongoing continuity of care.      Ryder Perez, [facility] SPORTS MEDICINE CLINIC [facility]    SUBJECTIVE- Interim History July 9, 2025    Chief Complaint   Patient presents with    Right Shoulder - Follow Up, Pain       Dillon Salter is a 29 year old male who is seen in f/u up for Other closed nondisplaced fracture of proximal end of right humerus with routine healing, subsequent encounter. Since last visit on 6/25/25 patient has scheduled physical therapy (7/28/25).  - Now ~ 2.5 weeks from initial  onset    Worsened by: movement (is very cautious with movement)  Better with: sling, medications  Treatments tried: other medications: Hydrocodone/Acetaminophen (Vicodin/Norco) and sling use  Associated symptoms:  numbness and tingling    The patient is seen with their mother.  The patient is Right handed    Orthopedic/Surgical history: YES - Date: Transplant - NSAID contraindication   Social History/Occupation: Not working due to being on disability      REVIEW OF SYSTEMS:  Negative unless mentioned above    OBJECTIVE:  There were no vitals taken for this visit.   General: healthy, alert and in no distress  Skin: no suspicious lesions or rash.  CV: distal perfusion intact   Resp: normal respiratory effort without conversational dyspnea   Psych: normal mood and affect  Gait: NORMAL  Neuro: Normal light sensory exam of RU extremity     Patient resting comfortably in sling    RADIOLOGY:  Final results and radiologist's interpretation, available in the [facility] health record.  Images were reviewed with the patient in the office today.  My personal interpretation of the performed imaging: No change in alignment of the known fracture of the greater tuberosity of the proximal humerus.  No definitive evidence of healing seen.  No new bony abnormalities.

## 2025-07-19 ENCOUNTER — HEALTH MAINTENANCE LETTER (OUTPATIENT)
Age: 30
End: 2025-07-19

## 2025-07-29 ENCOUNTER — ANESTHESIA EVENT (OUTPATIENT)
Dept: SURGERY | Facility: CLINIC | Age: 30
End: 2025-07-29
Payer: MEDICARE

## 2025-07-29 ASSESSMENT — LIFESTYLE VARIABLES: TOBACCO_USE: 1

## 2025-07-29 NOTE — ANESTHESIA PREPROCEDURE EVALUATION
Anesthesia Pre-Procedure Evaluation    Patient: Dillon Salter   MRN: [id number] : 1995          Procedure : Procedure(s):  ENDOSCOPIC RETROGRADE CHOLANGIOPANCREATOGRAPHY         Past Medical History:   Diagnosis Date    Acute on chronic anemia 2022    Alcohol use disorder     Alcohol use disorder, severe, dependence (H) 2022    Alcoholic cirrhosis of liver with ascites (H)     Alcoholic hepatitis (H)     Autism spectrum disorder 2022    High functioning autistic.  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  lives with parents (Leticia and Wes) socially isolated drinks alcohol beer daily     Benign essential hypertension     Bleeding esophageal varices (H) 2022    Hepatic encephalopathy (H)     Hyponatremia 2022    Major depressive disorder     Neuropathic pain     Status post liver transplantation (H) 2023    Type II Diabetes       Past Surgical History:   Procedure Laterality Date    BENCH LIVER  2023    Procedure: Bench liver;  Surgeon: Thelma Sterling MD;  Location: [facility]    COLONOSCOPY N/A 2023    Procedure: Colonoscopy;  Surgeon: Osman Montoya MD;  Location: [facility]    ENDOSCOPIC RETROGRADE CHOLANGIOPANCREATOGRAM N/A 2023    Procedure: Endoscopic retrograde cholangiopancreatogram. Biliary sphincterotomy, dilation, abnd stent placement;  Surgeon: Jarrell Mullins MD;  Location: [facility]    ENDOSCOPIC RETROGRADE CHOLANGIOPANCREATOGRAM N/A 2025    Procedure: ENDOSCOPIC RETROGRADE CHOLANGIOPANCREATOGRAPHY with biliary sludge removal, dilation and stent placement;  Surgeon: Guru Adeline Wright MD;  Location: [facility]    ENDOSCOPIC RETROGRADE CHOLANGIOPANCREATOGRAPHY, EXCHANGE TUBE/STENT N/A 2023    Procedure: ENDOSCOPIC RETROGRADE CHOLANGIOPANCREATOGRAPHY, WITH REPLACEMENT OF STENT, stone and sludge  removal;  Surgeon: Guru Adeline Wright MD;  [facility]    ENDOSCOPIC RETROGRADE CHOLANGIOPANCREATOGRAPHY, EXCHANGE TUBE/STENT N/A 11/8/2023    Procedure: ENDOSCOPIC RETROGRADE CHOLANGIOPANCREATOGRAPHY, WITH REPLACEMENT OF TUBE OR STENT, sludge removal, and biliary duct dilation.;  Surgeon: Guru Adeline Wright MD;  [facility]: [facility]    ENDOSCOPIC RETROGRADE CHOLANGIOPANCREATOGRAPHY, EXCHANGE TUBE/STENT N/A 4/16/2025    Procedure: ENDOSCOPIC RETROGRADE CHOLANGIOPANCREATOGRAPHY, WITH REPLACEMENT OF TUBE OR STENT and sludge removal.;  Surgeon: Guru Adeline Wright MD;  [facility]    ENDOSCOPIC ULTRASOUND LOWER GASTROINTESTIONAL TRACT (GI) N/A 1/27/2023    Procedure: ULTRASOUND, LOWER GI TRACT, ENDOSCOPIC with glueing;  Surgeon: Osman Montoya MD;  Location: [facility]    ESOPHAGOSCOPY, GASTROSCOPY, DUODENOSCOPY (EGD), COMBINED N/A 5/24/2022    Procedure: ESOPHAGOGASTRODUODENOSCOPY (EGD) with esophageal banding;  Surgeon: Gary Hurst MD;  [facility]    ESOPHAGOSCOPY, GASTROSCOPY, DUODENOSCOPY (EGD), COMBINED N/A 6/20/2022    Procedure: ESOPHAGOGASTRODUODENOSCOPY;  Surgeon: Gavino Reza MD;  Location: [facility]    ESOPHAGOSCOPY, GASTROSCOPY, DUODENOSCOPY (EGD), COMBINED N/A 1/23/2023    Procedure: ESOPHAGOGASTRODUODENOSCOPY (EGD) with esophageal variceal banding;  Surgeon: Juan Boo MD;  Location: [facility] OR    ESOPHAGOSCOPY, GASTROSCOPY, DUODENOSCOPY (EGD), COMBINED N/A 1/25/2023    Procedure: ESOPHAGOGASTRODUODENOSCOPY (EGD);  Surgeon: Juan Boo MD;  Location: [facility] OR    ESOPHAGOSCOPY, GASTROSCOPY, DUODENOSCOPY (EGD), COMBINED N/A 1/27/2023    Procedure: Esophagoscopy, gastroscopy, duodenoscopy (EGD), combined;  Surgeon: Osman Montoya MD;  Location: [facility]    ESOPHAGOSCOPY, GASTROSCOPY, DUODENOSCOPY (EGD), COMBINED N/A 2/12/2023    Procedure: ESOPHAGOGASTRODUODENOSCOPY (EGD);   Surgeon: Leventhal, Thomas Michael, MD;  Location: [facility]    IR CVC NON TUNNEL LINE REMOVAL  3/10/2023    IR CVC TUNNEL PLACEMENT > 5 YRS OF AGE  3/10/2023    IR CVC TUNNEL REMOVAL RIGHT  2023    IR LIVER BIOPSY PERCUTANEOUS  2023    MIDLINE DOUBLE LUMEN PLACEMENT  2022         PICC TRIPLE LUMEN PLACEMENT  2022         RETURN LIVER TRANSPLANT N/A 3/1/2023    Procedure: RETURN TO OPERATING ROOM, AFTER LIVER TRANSPLANT;  Surgeon: Thelma Sterling MD;  Location: [facility]    TRANSPLANT LIVER RECIPIENT  DONOR N/A 2023    Procedure: Transplant liver recipient  donor;  Surgeon: Thelma Sterling MD;  Location: [facility] OR      No Known Allergies   Social History     Tobacco Use    Smoking status: Former     Types: Cigarettes, Vaping Device     Passive exposure: Past    Smokeless tobacco: Never    Tobacco comments:     A pack lasted a year, hardly smoked   Substance Use Topics    Alcohol use: Not Currently     Comment: No ETOH since 20      Wt Readings from Last 1 Encounters:   25 79.8 kg (176 lb)        Anesthesia Evaluation   Pt has had prior anesthetic. Type: General.    No history of anesthetic complications       ROS/MED HX  ENT/Pulmonary:     (+)                tobacco use, Past use,                       Neurologic: Comment: Autism      Cardiovascular:     (+)  hypertension- -   -  - -                                 Previous cardiac testing   Echo: Date: 23 Results:  Interpretation Summary  Global and regional left ventricular function is normal with an EF of 60-65%.  Global right ventricular function is normal.  No significant valvular abnormalities present.  IVC diameter >2.1 cm collapsing <50% with sniff suggests a high RA pressure  estimated at 15 mmHg or greater.  No pericardial effusion is present.  Compared to prior, CVP is higher, no other change.    Stress Test:  Date: 23 Results:  Interpretation Summary  Normal dobutamine stress echocardiogram without  evidence of inducible  ischemia. Target heart rate was achieved. Heart rate and blood pressure  response to dobutamine were normal. Normal LV function and wall motion at  rest. With stress, the left ventricular ejection fraction increased from 55-  60% to greater than 65% and the left ventricular size decreased appropriately.  No regional wall motion abnormality with stress.  No subjective symptoms to suggest ischemia.  There was no ECG evidence of ischemia.  No significant valve disease on screening doppler evaluation. The aortic root  and visualized ascending aorta are normal.    ECG Reviewed:  Date: Results:    Cath:  Date: Results:      METS/Exercise Tolerance:     Hematologic:     (+)      anemia,          Musculoskeletal:       GI/Hepatic: Comment: Cirrhosis secondary to ETOH, S/P Liver Transplant 2 years ago    (+)           hepatitis type Alcoholic, liver disease,       Renal/Genitourinary:     (+) renal disease, type: CRI, Pt does not require dialysis,  Pt has history of transplant (2 years ago),         Endo:     (+)  type II DM,   Using insulin,   not previously admitted for DM/DKA.              Psychiatric/Substance Use:     (+) psychiatric history depression alcohol abuse      Infectious Disease:  - neg infectious disease ROS     Malignancy:  - neg malignancy ROS     Other:      (+)  , H/O Chronic Pain,           Physical Exam  Airway  Mallampati: III  TM distance: >3 FB  Neck ROM: full  Mouth opening: < 4 cm    Cardiovascular   Rhythm: regular  Rate: normal rate     Dental   (+) Multiple visibly decayed, broken teeth      increased risk of dental damage  Pulmonary - normal examBreath sounds clear to auscultation        Neurological - normal exam  He appears awake, alert and oriented x3.    Other Findings       OUTSIDE LABS:  CBC:   Lab Results   Component Value Date    WBC 5.9 06/27/2025    WBC 5.3 04/16/2025    HGB 10.2 (L) 06/27/2025    HGB 11.4 (L) 04/16/2025    HCT 29.1 (L) 06/27/2025    HCT 34.2  "(L) 04/16/2025     06/27/2025     04/16/2025     BMP:   Lab Results   Component Value Date     06/30/2025     (L) 06/27/2025    POTASSIUM 4.8 06/30/2025    POTASSIUM 6.2 (HH) 06/27/2025    CHLORIDE 101 06/30/2025    CHLORIDE 96 (L) 06/27/2025    CO2 24 06/30/2025    CO2 27 06/27/2025    BUN 35.7 (H) 06/30/2025    BUN 37.9 (H) 06/27/2025    CR 1.45 (H) 06/30/2025    CR 1.46 (H) 06/27/2025     (H) 06/30/2025     (H) 06/27/2025     COAGS:   Lab Results   Component Value Date    PTT 30 03/07/2023    INR 1.02 04/16/2025    FIBR 375 03/07/2023     POC: No results found for: \"BGM\", \"HCG\", \"HCGS\"  HEPATIC:   Lab Results   Component Value Date    ALBUMIN 4.0 06/27/2025    PROTTOTAL 6.5 06/27/2025    ALT 20 06/27/2025    AST 21 06/27/2025    ALKPHOS 277 (H) 06/27/2025    BILITOTAL 0.8 06/27/2025    DESI 11 (L) 03/05/2023     OTHER:   Lab Results   Component Value Date    PH 7.32 (L) 03/04/2023    LACT 0.7 03/06/2023    A1C 5.2 03/12/2023    SARTHAK 9.4 06/30/2025    PHOS 4.0 05/15/2024    MAG 1.8 05/15/2024    LIPASE 15 04/16/2025    AMYLASE 18 (L) 07/24/2023    TSH 1.09 07/30/2022       Anesthesia Plan    ASA Status:  3      NPO Status: NPO Appropriate   Anesthesia Type: General.  Airway: oral.  Induction: intravenous.  Maintenance: Balanced.   Techniques and Equipment:       - Monitoring Plan: standard ASA monitoring, train of four monitoring, processed EEG monitor     Consents    Anesthesia Plan(s) and associated risks, benefits, and realistic alternatives discussed. Questions answered and patient/representative(s) expressed understanding.     - Discussed: anesthesiologist     - Discussed with:  Patient        - Pt is DNR/DNI Status: no DNR     Blood Consent:      - Discussed with: patient.     - Consented: consented to blood products     Postoperative Care    Pain management: non-narcotic analgesics, plan for postoperative opioid use, multimodal analgesia.     Comments:                "    Neftali Zamoar MD    I have reviewed the pertinent notes and labs in the chart from the past 30 days and (re)examined the patient.  Any updates or changes from those notes are reflected in this note.    Clinically Significant Risk Factors Present on Admission                   # Hypertension: Noted on problem list

## 2025-07-30 ENCOUNTER — HOSPITAL ENCOUNTER (OUTPATIENT)
Facility: CLINIC | Age: 30
Discharge: HOME | End: 2025-07-30
Attending: INTERNAL MEDICINE | Admitting: INTERNAL MEDICINE
Payer: MEDICARE

## 2025-07-30 ENCOUNTER — ANESTHESIA (OUTPATIENT)
Dept: SURGERY | Facility: CLINIC | Age: 30
End: 2025-07-30
Payer: MEDICARE

## 2025-07-30 VITALS
TEMPERATURE: 97.7 F | HEART RATE: 58 BPM | OXYGEN SATURATION: 95 % | DIASTOLIC BLOOD PRESSURE: 100 MMHG | RESPIRATION RATE: 14 BRPM | SYSTOLIC BLOOD PRESSURE: 132 MMHG

## 2025-07-30 LAB
ALBUMIN SERPL BCG-MCNC: 3.6 G/DL (ref 3.5–5.2)
ALP SERPL-CCNC: 220 U/L (ref 40–150)
ALT SERPL W P-5'-P-CCNC: 15 U/L (ref 0–70)
ANION GAP SERPL CALCULATED.3IONS-SCNC: 9 MMOL/L (ref 7–15)
AST SERPL W P-5'-P-CCNC: 20 U/L (ref 0–45)
BILIRUB SERPL-MCNC: 0.3 MG/DL
BUN SERPL-MCNC: 34.8 MG/DL (ref 6–20)
CALCIUM SERPL-MCNC: 9.2 MG/DL (ref 8.8–10.4)
CHLORIDE SERPL-SCNC: 103 MMOL/L (ref 98–107)
CREAT SERPL-MCNC: 1.26 MG/DL (ref 0.67–1.17)
EGFRCR SERPLBLD CKD-EPI 2021: 78 ML/MIN/1.73M2
ERCP: NORMAL
ERYTHROCYTE [DISTWIDTH] IN BLOOD BY AUTOMATED COUNT: 12.8 % (ref 10–15)
GLUCOSE BLDC GLUCOMTR-MCNC: 114 MG/DL (ref 70–99)
GLUCOSE BLDC GLUCOMTR-MCNC: 205 MG/DL (ref 70–99)
GLUCOSE SERPL-MCNC: 224 MG/DL (ref 70–99)
HCO3 SERPL-SCNC: 25 MMOL/L (ref 22–29)
HCT VFR BLD AUTO: 28 % (ref 40–53)
HGB BLD-MCNC: 9.4 G/DL (ref 13.3–17.7)
INR PPP: 1 (ref 0.85–1.15)
LIPASE SERPL-CCNC: 31 U/L (ref 13–60)
MCH RBC QN AUTO: 30 PG (ref 26.5–33)
MCHC RBC AUTO-ENTMCNC: 33.6 G/DL (ref 31.5–36.5)
MCV RBC AUTO: 90 FL (ref 78–100)
PLATELET # BLD AUTO: 146 10E3/UL (ref 150–450)
POTASSIUM SERPL-SCNC: 4.5 MMOL/L (ref 3.4–5.3)
PROT SERPL-MCNC: 6 G/DL (ref 6.4–8.3)
PROTHROMBIN TIME: 13.5 SECONDS (ref 11.8–14.8)
RBC # BLD AUTO: 3.13 10E6/UL (ref 4.4–5.9)
SODIUM SERPL-SCNC: 137 MMOL/L (ref 135–145)
WBC # BLD AUTO: 5.1 10E3/UL (ref 4–11)

## 2025-07-30 PROCEDURE — C1877 STENT, NON-COAT/COV W/O DEL: HCPCS | Performed by: INTERNAL MEDICINE

## 2025-07-30 PROCEDURE — 258N000003 HC RX IP 258 OP 636: Performed by: NURSE ANESTHETIST, CERTIFIED REGISTERED

## 2025-07-30 PROCEDURE — 250N000009 HC RX 250: Performed by: NURSE ANESTHETIST, CERTIFIED REGISTERED

## 2025-07-30 PROCEDURE — 258N000003 HC RX IP 258 OP 636: Performed by: STUDENT IN AN ORGANIZED HEALTH CARE EDUCATION/TRAINING PROGRAM

## 2025-07-30 PROCEDURE — 250N000013 HC RX MED GY IP 250 OP 250 PS 637: Performed by: NURSE ANESTHETIST, CERTIFIED REGISTERED

## 2025-07-30 PROCEDURE — 710N000012 HC RECOVERY PHASE 2, PER MINUTE: Performed by: INTERNAL MEDICINE

## 2025-07-30 PROCEDURE — 85027 COMPLETE CBC AUTOMATED: CPT

## 2025-07-30 PROCEDURE — 255N000002 HC RX 255 OP 636: Performed by: INTERNAL MEDICINE

## 2025-07-30 PROCEDURE — 82962 GLUCOSE BLOOD TEST: CPT

## 2025-07-30 PROCEDURE — 82040 ASSAY OF SERUM ALBUMIN: CPT

## 2025-07-30 PROCEDURE — 250N000011 HC RX IP 250 OP 636: Performed by: NURSE ANESTHETIST, CERTIFIED REGISTERED

## 2025-07-30 PROCEDURE — 83690 ASSAY OF LIPASE: CPT

## 2025-07-30 PROCEDURE — 370N000017 HC ANESTHESIA TECHNICAL FEE, PER MIN: Performed by: INTERNAL MEDICINE

## 2025-07-30 PROCEDURE — C1726 CATH, BAL DIL, NON-VASCULAR: HCPCS | Performed by: INTERNAL MEDICINE

## 2025-07-30 PROCEDURE — 360N000082 HC SURGERY LEVEL 2 W/ FLUORO, PER MIN: Performed by: INTERNAL MEDICINE

## 2025-07-30 PROCEDURE — 250N000012 HC RX MED GY IP 250 OP 636 PS 637: Performed by: STUDENT IN AN ORGANIZED HEALTH CARE EDUCATION/TRAINING PROGRAM

## 2025-07-30 PROCEDURE — 710N000010 HC RECOVERY PHASE 1, LEVEL 2, PER MIN: Performed by: INTERNAL MEDICINE

## 2025-07-30 PROCEDURE — 272N000001 HC OR GENERAL SUPPLY STERILE: Performed by: INTERNAL MEDICINE

## 2025-07-30 PROCEDURE — 85610 PROTHROMBIN TIME: CPT

## 2025-07-30 PROCEDURE — 250N000009 HC RX 250: Performed by: INTERNAL MEDICINE

## 2025-07-30 PROCEDURE — 999N000141 HC STATISTIC PRE-PROCEDURE NURSING ASSESSMENT: Performed by: INTERNAL MEDICINE

## 2025-07-30 PROCEDURE — 36415 COLL VENOUS BLD VENIPUNCTURE: CPT

## 2025-07-30 PROCEDURE — 250N000013 HC RX MED GY IP 250 OP 250 PS 637: Performed by: STUDENT IN AN ORGANIZED HEALTH CARE EDUCATION/TRAINING PROGRAM

## 2025-07-30 PROCEDURE — 250N000024 HC ISOFLURANE, PER MIN: Performed by: INTERNAL MEDICINE

## 2025-07-30 PROCEDURE — C1769 GUIDE WIRE: HCPCS | Performed by: INTERNAL MEDICINE

## 2025-07-30 RX ORDER — ONDANSETRON 2 MG/ML
4 INJECTION INTRAMUSCULAR; INTRAVENOUS EVERY 30 MIN PRN
Status: DISCONTINUED | OUTPATIENT
Start: 2025-07-30 | End: 2025-07-30 | Stop reason: HOSPADM

## 2025-07-30 RX ORDER — OXYCODONE HYDROCHLORIDE 5 MG/1
5 TABLET ORAL
Status: DISCONTINUED | OUTPATIENT
Start: 2025-07-30 | End: 2025-07-30 | Stop reason: HOSPADM

## 2025-07-30 RX ORDER — SODIUM CHLORIDE, SODIUM LACTATE, POTASSIUM CHLORIDE, CALCIUM CHLORIDE 600; 310; 30; 20 MG/100ML; MG/100ML; MG/100ML; MG/100ML
INJECTION, SOLUTION INTRAVENOUS CONTINUOUS PRN
Status: DISCONTINUED | OUTPATIENT
Start: 2025-07-30 | End: 2025-07-30

## 2025-07-30 RX ORDER — NALOXONE HYDROCHLORIDE 0.4 MG/ML
0.2 INJECTION, SOLUTION INTRAMUSCULAR; INTRAVENOUS; SUBCUTANEOUS
Status: DISCONTINUED | OUTPATIENT
Start: 2025-07-30 | End: 2025-07-30 | Stop reason: HOSPADM

## 2025-07-30 RX ORDER — NALOXONE HYDROCHLORIDE 0.4 MG/ML
0.1 INJECTION, SOLUTION INTRAMUSCULAR; INTRAVENOUS; SUBCUTANEOUS
Status: DISCONTINUED | OUTPATIENT
Start: 2025-07-30 | End: 2025-07-30 | Stop reason: HOSPADM

## 2025-07-30 RX ORDER — ONDANSETRON 4 MG/1
4 TABLET, ORALLY DISINTEGRATING ORAL EVERY 30 MIN PRN
Status: DISCONTINUED | OUTPATIENT
Start: 2025-07-30 | End: 2025-07-30 | Stop reason: HOSPADM

## 2025-07-30 RX ORDER — PROCHLORPERAZINE MALEATE 5 MG/1
10 TABLET ORAL EVERY 6 HOURS PRN
Status: DISCONTINUED | OUTPATIENT
Start: 2025-07-30 | End: 2025-07-30 | Stop reason: HOSPADM

## 2025-07-30 RX ORDER — DEXAMETHASONE SODIUM PHOSPHATE 4 MG/ML
4 INJECTION, SOLUTION INTRA-ARTICULAR; INTRALESIONAL; INTRAMUSCULAR; INTRAVENOUS; SOFT TISSUE
Status: DISCONTINUED | OUTPATIENT
Start: 2025-07-30 | End: 2025-07-30 | Stop reason: HOSPADM

## 2025-07-30 RX ORDER — LIDOCAINE HYDROCHLORIDE 20 MG/ML
INJECTION, SOLUTION INFILTRATION; PERINEURAL PRN
Status: DISCONTINUED | OUTPATIENT
Start: 2025-07-30 | End: 2025-07-30

## 2025-07-30 RX ORDER — OXYCODONE HYDROCHLORIDE 10 MG/1
10 TABLET ORAL
Status: DISCONTINUED | OUTPATIENT
Start: 2025-07-30 | End: 2025-07-30 | Stop reason: HOSPADM

## 2025-07-30 RX ORDER — NALOXONE HYDROCHLORIDE 0.4 MG/ML
0.4 INJECTION, SOLUTION INTRAMUSCULAR; INTRAVENOUS; SUBCUTANEOUS
Status: DISCONTINUED | OUTPATIENT
Start: 2025-07-30 | End: 2025-07-30 | Stop reason: HOSPADM

## 2025-07-30 RX ORDER — FENTANYL CITRATE 50 UG/ML
25 INJECTION, SOLUTION INTRAMUSCULAR; INTRAVENOUS EVERY 5 MIN PRN
Status: DISCONTINUED | OUTPATIENT
Start: 2025-07-30 | End: 2025-07-30 | Stop reason: HOSPADM

## 2025-07-30 RX ORDER — FENTANYL CITRATE 50 UG/ML
50 INJECTION, SOLUTION INTRAMUSCULAR; INTRAVENOUS EVERY 5 MIN PRN
Status: DISCONTINUED | OUTPATIENT
Start: 2025-07-30 | End: 2025-07-30 | Stop reason: HOSPADM

## 2025-07-30 RX ORDER — HYDROMORPHONE HCL IN WATER/PF 6 MG/30 ML
0.4 PATIENT CONTROLLED ANALGESIA SYRINGE INTRAVENOUS EVERY 5 MIN PRN
Status: DISCONTINUED | OUTPATIENT
Start: 2025-07-30 | End: 2025-07-30 | Stop reason: HOSPADM

## 2025-07-30 RX ORDER — HYDROMORPHONE HCL IN WATER/PF 6 MG/30 ML
0.2 PATIENT CONTROLLED ANALGESIA SYRINGE INTRAVENOUS EVERY 5 MIN PRN
Status: DISCONTINUED | OUTPATIENT
Start: 2025-07-30 | End: 2025-07-30 | Stop reason: HOSPADM

## 2025-07-30 RX ORDER — LIDOCAINE 40 MG/G
CREAM TOPICAL
Status: DISCONTINUED | OUTPATIENT
Start: 2025-07-30 | End: 2025-07-30 | Stop reason: HOSPADM

## 2025-07-30 RX ORDER — CIPROFLOXACIN 2 MG/ML
INJECTION, SOLUTION INTRAVENOUS PRN
Status: DISCONTINUED | OUTPATIENT
Start: 2025-07-30 | End: 2025-07-30

## 2025-07-30 RX ORDER — SODIUM CHLORIDE, SODIUM LACTATE, POTASSIUM CHLORIDE, CALCIUM CHLORIDE 600; 310; 30; 20 MG/100ML; MG/100ML; MG/100ML; MG/100ML
INJECTION, SOLUTION INTRAVENOUS CONTINUOUS
Status: DISCONTINUED | OUTPATIENT
Start: 2025-07-30 | End: 2025-07-30 | Stop reason: HOSPADM

## 2025-07-30 RX ORDER — ONDANSETRON 2 MG/ML
4 INJECTION INTRAMUSCULAR; INTRAVENOUS EVERY 6 HOURS PRN
Status: DISCONTINUED | OUTPATIENT
Start: 2025-07-30 | End: 2025-07-30 | Stop reason: HOSPADM

## 2025-07-30 RX ORDER — ACETAMINOPHEN 325 MG/1
975 TABLET ORAL ONCE
Status: COMPLETED | OUTPATIENT
Start: 2025-07-30 | End: 2025-07-30

## 2025-07-30 RX ORDER — FENTANYL CITRATE 50 UG/ML
INJECTION, SOLUTION INTRAMUSCULAR; INTRAVENOUS PRN
Status: DISCONTINUED | OUTPATIENT
Start: 2025-07-30 | End: 2025-07-30

## 2025-07-30 RX ORDER — ONDANSETRON 4 MG/1
4 TABLET, ORALLY DISINTEGRATING ORAL EVERY 6 HOURS PRN
Status: DISCONTINUED | OUTPATIENT
Start: 2025-07-30 | End: 2025-07-30 | Stop reason: HOSPADM

## 2025-07-30 RX ORDER — ALBUTEROL SULFATE 90 UG/1
INHALANT RESPIRATORY (INHALATION) PRN
Status: DISCONTINUED | OUTPATIENT
Start: 2025-07-30 | End: 2025-07-30

## 2025-07-30 RX ORDER — PROPOFOL 10 MG/ML
INJECTION, EMULSION INTRAVENOUS PRN
Status: DISCONTINUED | OUTPATIENT
Start: 2025-07-30 | End: 2025-07-30

## 2025-07-30 RX ORDER — IOPAMIDOL 510 MG/ML
INJECTION, SOLUTION INTRAVASCULAR PRN
Status: DISCONTINUED | OUTPATIENT
Start: 2025-07-30 | End: 2025-07-30 | Stop reason: HOSPADM

## 2025-07-30 RX ORDER — FLUMAZENIL 0.1 MG/ML
0.2 INJECTION, SOLUTION INTRAVENOUS
Status: DISCONTINUED | OUTPATIENT
Start: 2025-07-30 | End: 2025-07-30 | Stop reason: HOSPADM

## 2025-07-30 RX ORDER — METOPROLOL TARTRATE 25 MG/1
25 TABLET, FILM COATED ORAL ONCE
Status: COMPLETED | OUTPATIENT
Start: 2025-07-30 | End: 2025-07-30

## 2025-07-30 RX ORDER — INDOMETHACIN 50 MG/1
SUPPOSITORY RECTAL PRN
Status: DISCONTINUED | OUTPATIENT
Start: 2025-07-30 | End: 2025-07-30 | Stop reason: HOSPADM

## 2025-07-30 RX ADMIN — Medication 50 MG: at 07:38

## 2025-07-30 RX ADMIN — ALBUTEROL SULFATE 1 PUFF: 108 INHALANT RESPIRATORY (INHALATION) at 08:24

## 2025-07-30 RX ADMIN — ALBUTEROL SULFATE 1 PUFF: 108 INHALANT RESPIRATORY (INHALATION) at 08:22

## 2025-07-30 RX ADMIN — DEXMEDETOMIDINE HYDROCHLORIDE 8 MCG: 100 INJECTION, SOLUTION INTRAVENOUS at 07:27

## 2025-07-30 RX ADMIN — PHENYLEPHRINE HYDROCHLORIDE 100 MCG: 10 INJECTION INTRAVENOUS at 07:37

## 2025-07-30 RX ADMIN — SODIUM CHLORIDE, SODIUM LACTATE, POTASSIUM CHLORIDE, AND CALCIUM CHLORIDE: .6; .31; .03; .02 INJECTION, SOLUTION INTRAVENOUS at 07:20

## 2025-07-30 RX ADMIN — METOPROLOL TARTRATE 25 MG: 25 TABLET, FILM COATED ORAL at 07:18

## 2025-07-30 RX ADMIN — DEXMEDETOMIDINE HYDROCHLORIDE 4 MCG: 100 INJECTION, SOLUTION INTRAVENOUS at 08:06

## 2025-07-30 RX ADMIN — Medication 100 MG: at 08:18

## 2025-07-30 RX ADMIN — LIDOCAINE HYDROCHLORIDE 80 MG: 20 INJECTION, SOLUTION INFILTRATION; PERINEURAL at 07:37

## 2025-07-30 RX ADMIN — PROPOFOL 120 MG: 10 INJECTION, EMULSION INTRAVENOUS at 07:37

## 2025-07-30 RX ADMIN — ALBUTEROL SULFATE 1 PUFF: 108 INHALANT RESPIRATORY (INHALATION) at 08:26

## 2025-07-30 RX ADMIN — CIPROFLOXACIN 400 MG: 400 INJECTION, SOLUTION INTRAVENOUS at 07:50

## 2025-07-30 RX ADMIN — MIDAZOLAM 2 MG: 1 INJECTION INTRAMUSCULAR; INTRAVENOUS at 07:27

## 2025-07-30 RX ADMIN — SODIUM CHLORIDE 2 UNITS: 0.9 INJECTION, SOLUTION INTRAVENOUS at 07:19

## 2025-07-30 RX ADMIN — ACETAMINOPHEN 975 MG: 325 TABLET ORAL at 06:48

## 2025-07-30 RX ADMIN — ALBUTEROL SULFATE 1 PUFF: 108 INHALANT RESPIRATORY (INHALATION) at 08:28

## 2025-07-30 RX ADMIN — FENTANYL CITRATE 100 MCG: 50 INJECTION INTRAMUSCULAR; INTRAVENOUS at 07:48

## 2025-07-30 ASSESSMENT — ACTIVITIES OF DAILY LIVING (ADL)
ADLS_ACUITY_SCORE: 52
ADLS_ACUITY_SCORE: 52
ADLS_ACUITY_SCORE: 53
ADLS_ACUITY_SCORE: 52

## 2025-07-30 NOTE — ANESTHESIA PROCEDURE NOTES
Airway       Patient location during procedure: OR       Procedure Start/Stop Times: 7/30/2025 7:42 AM  Staff -        CRNA: Carlos Alberto Wade APRN CRNA       Performed By: CRNA  Consent for Airway        Urgency: elective  Indications and Patient Condition       Indications for airway management: jalen-procedural       Induction type:intravenous       Mask difficulty assessment: 2 - vent by mask + OA or adjuvant +/- NMBA    Final Airway Details       Final airway type: endotracheal airway       Successful airway: ETT - single  Endotracheal Airway Details        ETT size (mm): 8.0       Cuffed: yes       Successful intubation technique: direct laryngoscopy       DL Blade Type: Rico 2       Grade View of Cords: 1       Position: Right       Measured from: gums/teeth       Secured at (cm): 22    Post intubation assessment        Placement verified by: capnometry, equal breath sounds and chest rise        Number of attempts at approach: 1       Secured with: cloth tape       Ease of procedure: easy       Dentition: Intact    Medication(s) Administered   Medication Administration Time: 7/30/2025 7:42 AM

## 2025-07-30 NOTE — ANESTHESIA POSTPROCEDURE EVALUATION
Patient: Dillon Salter    Procedure: Procedure(s):  Endoscopic Retrograde Cholangiopancreatography with biliary stent exchange, debris removal       Anesthesia Type:  General    Note:  Disposition: Outpatient   Postop Pain Control: Uneventful            Sign Out: Well controlled pain   PONV: No   Neuro/Psych: Uneventful            Sign Out: Acceptable/Baseline neuro status   Airway/Respiratory: Uneventful            Sign Out: Acceptable/Baseline resp. status   CV/Hemodynamics: Uneventful            Sign Out: Acceptable CV status; No obvious hypovolemia; No obvious fluid overload   Other NRE: NONE   DID A NON-ROUTINE EVENT OCCUR? No           Last vitals:  Vitals Value Taken Time   /91 07/30/25 10:15   Temp 36.4  C (97.6  F) 07/30/25 10:15   Pulse 57 07/30/25 10:26   Resp 13 07/30/25 10:26   SpO2 97 % 07/30/25 10:26   Vitals shown include unfiled device data.    Electronically Signed By: Neftali Zamora MD  July 30, 2025  10:27 AM

## 2025-07-30 NOTE — DISCHARGE INSTRUCTIONS
- Will recommend strict avoidance of Aspirin and anti-coagulation for 3 days to prevent post-sphincterotomy bleeding    -Confirm spontaneous stent passage by performing a flat and upright abdominal x-ray in 4 weeks.                         - If stent is present,will need an upper endoscopy under moderate conscious sedation in Unit J for biliary stent removal.                CONTACTING YOUR DOCTOR:    To contact a doctor, call Dr. Duron' office at [phone]  or:    [phone] and ask for the resident on call for Gastroenterology (answered 24 hours a day)     Emergency Department:    [facility]: [phone]  [facility]: [phone] 911 if you are in need of immediate or emergent help

## 2025-07-30 NOTE — ANESTHESIA CARE TRANSFER NOTE
Patient: Dillon Salter    Procedure: Procedure(s):  Endoscopic Retrograde Cholangiopancreatography with biliary stent exchange, debris removal       Diagnosis: Biliary stricture (H) [K83.1]  Diagnosis Additional Information: No value filed.    Anesthesia Type:   General     Note:      Level of Consciousness: drowsy  Oxygen Supplementation: nasal cannula  Level of Supplemental Oxygen (L/min / FiO2): 4  Independent Airway: airway patency satisfactory and stable  Dentition: dentition unchanged  Vital Signs Stable: post-procedure vital signs reviewed and stable  Report to RN Given: handoff report given  Patient transferred to: PACU    Handoff Report: Identifed the Patient, Identified the Reponsible Provider, Reviewed the pertinent medical history, Discussed the surgical course, Reviewed Intra-OP anesthesia mangement and issues during anesthesia, Set expectations for post-procedure period and Allowed opportunity for questions and acknowledgement of understanding      Vitals:  Vitals Value Taken Time   /96 07/30/25 08:37   Temp 36.1  C (97  F) 07/30/25 08:35   Pulse 72 07/30/25 08:40   Resp 19 07/30/25 08:40   SpO2 98 % 07/30/25 08:40   Vitals shown include unfiled device data.    Electronically Signed By: VAMSHI Perez CRNA  July 30, 2025  8:41 AM

## 2025-08-09 ENCOUNTER — HEALTH MAINTENANCE LETTER (OUTPATIENT)
Age: 30
End: 2025-08-09

## 2025-08-11 DIAGNOSIS — Z94.4 LIVER REPLACED BY TRANSPLANT (H): ICD-10-CM

## 2025-08-11 RX ORDER — URSODIOL 300 MG/1
300 CAPSULE ORAL 2 TIMES DAILY
Qty: 180 CAPSULE | Refills: 3 | Status: SHIPPED | OUTPATIENT
Start: 2025-08-11

## 2025-08-13 ENCOUNTER — CARE COORDINATION (OUTPATIENT)
Dept: GASTROENTEROLOGY | Facility: CLINIC | Age: 30
End: 2025-08-13
Payer: COMMERCIAL

## 2025-08-13 DIAGNOSIS — Z46.89 ENCOUNTER FOR REMOVAL OF BILIARY STENT: Primary | ICD-10-CM

## (undated) DEVICE — SU ETHILON 3-0 PS-1 18" 1663H

## (undated) DEVICE — ESU GROUND PAD ADULT W/CORD E7507

## (undated) DEVICE — BIOPSY VALVE BIOSHIELD 00711135

## (undated) DEVICE — GLOVE 7.5 PROTEXIS PI CLASSIC 2D72PL75X

## (undated) DEVICE — SU PDS II 0 TP-1 60" Z991G

## (undated) DEVICE — SUCTION MANIFOLD NEPTUNE 2 SYS 4 PORT 0702-020-000

## (undated) DEVICE — ENDO FCP GRASPING ROTATABLE 7.2MMX2.6MM FG-244NR

## (undated) DEVICE — ENDO CATH BALLOON DILATION HURRICANE 04MMX4X180CM M00545900

## (undated) DEVICE — ESU LIGASURE IMPACT OPEN SEALER/DVDR CVD LG JAW LF4418

## (undated) DEVICE — INFLATION DEVICE BIG 60 ENDO-AN6012

## (undated) DEVICE — NDL 21GA 1.5"

## (undated) DEVICE — LINEN TOWEL PACK X30 5481

## (undated) DEVICE — PACK ENDOSCOPY GI CUSTOM UMMC

## (undated) DEVICE — SOL WATER IRRIG 1000ML BOTTLE 2F7114

## (undated) DEVICE — SU VICRYL 3-0 SH 27" UND J416H

## (undated) DEVICE — ENDO CAP AND TUBING STERILE FOR ENDOGATOR  100130

## (undated) DEVICE — SUCTION TIP POOLE K770

## (undated) DEVICE — SU SILK 1 TIE 6X30" A307H

## (undated) DEVICE — ENDO BITE BLOCK ADULT OMNI-BLOC

## (undated) DEVICE — ENDO DEVICE LOCKING AND BIOPSY CAP M00545261

## (undated) DEVICE — SU SILK 0 TIE 6X30" A306H

## (undated) DEVICE — LINEN GOWN XLG 5407

## (undated) DEVICE — SU PROLENE 4-0 SHDA 36" 8521H

## (undated) DEVICE — ENDO ENDO DISTAL MAJ-2315

## (undated) DEVICE — SU PDS II 6-0 RB-2DA 30" Z149H

## (undated) DEVICE — WIRE GUIDE 0.025"X270CM STR VISIGLIDE G-240-2527S

## (undated) DEVICE — ENDO SNARE POLYPECTOMY OVAL 15MM LOOP SD-240U-15

## (undated) DEVICE — KIT ENDO FIRST STEP DISINFECTANT 200ML W/POUCH EP-4

## (undated) DEVICE — CATH RETRIEVAL BALLOON EXTRACTOR PRO RX-S INJ ABOVE 9-12MM

## (undated) DEVICE — Device

## (undated) DEVICE — SPONGE LAP 18X18" X8435

## (undated) DEVICE — ENDO FUSION OMNI-TOME G31903

## (undated) DEVICE — BITE BLOCK ENDO W/DENTAL RIM 54FR SBT-114-100

## (undated) DEVICE — KIT CONNECTOR FOR OLYMPUS ENDOSCOPES DEFENDO 100310

## (undated) DEVICE — SU PROLENE 5-0 RB-1DA 36"  8556H

## (undated) DEVICE — LINEN TOWEL PACK X6 WHITE 5487

## (undated) DEVICE — TUBING SUCTION MEDI-VAC 1/4"X20' N620A - HE

## (undated) DEVICE — SUCTION MANIFOLD NEPTUNE 2 SYS 1 PORT 702-025-000

## (undated) DEVICE — SU PROLENE 4-0 RB-1DA 36" 8557H

## (undated) DEVICE — SU PROLENE 6-0 RB-2DA 30" 8711H

## (undated) DEVICE — GUIDEWIRE NOVAGOLD .018X260CM STR TIP M00552000

## (undated) DEVICE — TUBE FEEDING NEOCONNECT SIL ENFIT 5FR 40CM PFTS5.0S-NC

## (undated) DEVICE — SYR BULB IRRIG DOVER 60 ML LATEX FREE 67000

## (undated) DEVICE — SU VICRYL 0 CT-1 CR 8X27" UND JJ41G

## (undated) DEVICE — ENDO CATH BALLOON DILATION HURRICANE 08MMX4X180CM M00545940

## (undated) DEVICE — INTR ENDOSCOPIC STENT FUSION OASIS 09.0FRX200CM

## (undated) DEVICE — STPL SKIN 35W ROTATING HEAD PRW35

## (undated) DEVICE — GLOVE BIOGEL PI ULTRATOUCH G SZ 7.5 42175

## (undated) DEVICE — PAD CHUX UNDERPAD 23X24" 7136

## (undated) DEVICE — NDL COUNTER 40CT  31142311

## (undated) DEVICE — ELTRD DFBR ADLT 15.2X10.8CM ADH PD RDTRNS PCH PADPRO UNV NS

## (undated) DEVICE — DRSG PRIMAPORE 02X3" 7133

## (undated) DEVICE — SURGICEL ABSORBABLE HEMOSTAT SNOW 4"X4" 2083

## (undated) DEVICE — ESU ELEC BLADE 6" COATED E1450-6

## (undated) DEVICE — SOL NACL 0.9% IRRIG 1000ML BOTTLE 2F7124

## (undated) DEVICE — ADH SKNCLS HISTOACRYL TOP .5ML CLR

## (undated) DEVICE — RETR VISCERA FISH MED 3204

## (undated) DEVICE — ENDO NDL BALL TIP ULTRASOUND 19GA ECHO-19

## (undated) DEVICE — SU PDS II 0 CT-1 27" Z340H

## (undated) DEVICE — SYR 10ML LL W/O NDL 302995

## (undated) DEVICE — SU SILK 3-0 SH CR 8X18" C013D

## (undated) DEVICE — DRAPE FLUID WARMING 52"X66" ORS-301

## (undated) DEVICE — TUBE FEEDING NEOCONNECT POLY ENFIT 8FR 24" PFTM8.0P-NC

## (undated) DEVICE — SU PROLENE 7-0 BV-1DA 30" 8703H

## (undated) DEVICE — ENDO TUBING CO2 SMARTCAP STERILE DISP 100145CO2EXT

## (undated) DEVICE — DRAIN JACKSON PRATT RESERVOIR 400ML SU130-1000

## (undated) DEVICE — CATH TRAY FOLEY SURESTEP 16FR W/TMP PRB STLK LATEX A319416AM

## (undated) DEVICE — SU PROLENE 2-0 SHDA 36" 8523H

## (undated) DEVICE — SOL NACL 0.9% 10ML VIAL 0409-4888-02

## (undated) DEVICE — SU VICRYL 3-0 FS-1 27" J442H

## (undated) DEVICE — ENDO PROBE COVER ULTRASOUND BALLOON LATEX  MAJ-249

## (undated) DEVICE — DRAPE ISOLATION BAG 1003

## (undated) DEVICE — STENT SYSTEM RMV
Type: IMPLANTABLE DEVICE | Site: BILE DUCT | Status: NON-FUNCTIONAL
Brand: WALLFLEX BILIARY

## (undated) DEVICE — DRAPE IOBAN INCISE 23X17" 6650EZ

## (undated) DEVICE — SUCTION TIP YANKAUER W/O VENT K86

## (undated) DEVICE — ENDO LIGATOR UNIV 6 BAND G31917 MBL-U-6

## (undated) DEVICE — GLOVE BIOGEL PI MICRO SZ 7.5 48575

## (undated) DEVICE — DRAPE SHEET REV FOLD 3/4 9349

## (undated) DEVICE — SU SILK 3-0 TIE 12X30" A304H

## (undated) DEVICE — SYR 01ML MEDALLION SALINE BLUE MSS011-LB

## (undated) DEVICE — SU SILK 4-0 TIE 12X30" A303H

## (undated) DEVICE — SOL NACL 0.9% IRRIG 1000ML BOTTLE 07138-09

## (undated) DEVICE — ENDO CATH BALLOON DILATION HURRICANE 06MMX4X180CM M00545920

## (undated) DEVICE — SU PDS II 4-0 RB-1 27" Z304H

## (undated) DEVICE — RX VISTASEAL FIBRIN SEALANT W/THROMBIN 10ML VST10

## (undated) DEVICE — CLIP APPLIER 11" MED LIGACLIP MCM30

## (undated) DEVICE — ESU PENCIL W/COATED BLADE E2450H

## (undated) DEVICE — LIGATOR SPEEDBAND SUPERVIEW 7 BAND LATEX FREE 4225

## (undated) DEVICE — SU PROLENE 4-0 RB-1DA 18" 8757H

## (undated) DEVICE — SU PDS II 1 CTX 36" Z371T

## (undated) DEVICE — GRASPING DEVICE RAPTOR RAT TOOTH 00711177

## (undated) DEVICE — DRAIN JACKSON PRATT ROUND W/TROCAR 19FR JP-HUR195

## (undated) DEVICE — SPHINCTEROTOME 5.5FRX25MM OMNI-TOME FS-OMNI-35 G31911

## (undated) DEVICE — STPL ENDO LINEAR CUT ECHELON GST 45MM COMPACT PCEE45A

## (undated) DEVICE — BLADE CLIPPER SGL USE 9680

## (undated) DEVICE — DRAPE SLUSH/WARMER 66X44" ORS-320

## (undated) DEVICE — PREP CHLORAPREP 26ML TINTED HI-LITE ORANGE 930815

## (undated) DEVICE — ESU GROUND PAD THERMOGUARD PLUS ABC PEDS 7-382

## (undated) DEVICE — ESU HANDPIECE ABC BEND-A-BEAM 6" 134006

## (undated) DEVICE — ESU PENCIL SMOKE EVAC W/ROCKER SWITCH 0703-047-000

## (undated) DEVICE — DRSG MEDIPORE 3 1/2X13 3/4" 3573

## (undated) DEVICE — CLEANING ADAPTER OLY NS 4008055

## (undated) DEVICE — CLIP APPLIER 09 3/8" SM LIGACLIP MCS20

## (undated) DEVICE — SURGICEL FIBRILLAR HEMOSTAT 4"X4" 1963

## (undated) DEVICE — SU SILK 2-0 TIE 12X30" A305H

## (undated) DEVICE — SU PDS II 0 CTX 36" Z370T

## (undated) DEVICE — SUCTION TIP YANKAUER STR K87

## (undated) RX ORDER — GLYCOPYRROLATE 0.2 MG/ML
INJECTION, SOLUTION INTRAMUSCULAR; INTRAVENOUS
Status: DISPENSED
Start: 2023-11-08

## (undated) RX ORDER — FENTANYL CITRATE 50 UG/ML
INJECTION, SOLUTION INTRAMUSCULAR; INTRAVENOUS
Status: DISPENSED
Start: 2025-04-16

## (undated) RX ORDER — FENTANYL CITRATE 50 UG/ML
INJECTION, SOLUTION INTRAMUSCULAR; INTRAVENOUS
Status: DISPENSED
Start: 2023-07-24

## (undated) RX ORDER — HYDROMORPHONE HCL IN WATER/PF 6 MG/30 ML
PATIENT CONTROLLED ANALGESIA SYRINGE INTRAVENOUS
Status: DISPENSED
Start: 2025-04-16

## (undated) RX ORDER — OXYCODONE HYDROCHLORIDE 5 MG/1
TABLET ORAL
Status: DISPENSED
Start: 2025-04-16

## (undated) RX ORDER — LIDOCAINE HYDROCHLORIDE 10 MG/ML
INJECTION, SOLUTION EPIDURAL; INFILTRATION; INTRACAUDAL; PERINEURAL
Status: DISPENSED
Start: 2023-05-18

## (undated) RX ORDER — INDOMETHACIN 50 MG/1
SUPPOSITORY RECTAL
Status: DISPENSED
Start: 2025-02-24

## (undated) RX ORDER — FENTANYL CITRATE 50 UG/ML
INJECTION, SOLUTION INTRAMUSCULAR; INTRAVENOUS
Status: DISPENSED
Start: 2023-03-01

## (undated) RX ORDER — FENTANYL CITRATE 50 UG/ML
INJECTION, SOLUTION INTRAMUSCULAR; INTRAVENOUS
Status: DISPENSED
Start: 2023-02-28

## (undated) RX ORDER — DEXAMETHASONE SODIUM PHOSPHATE 4 MG/ML
INJECTION, SOLUTION INTRA-ARTICULAR; INTRALESIONAL; INTRAMUSCULAR; INTRAVENOUS; SOFT TISSUE
Status: DISPENSED
Start: 2023-02-12

## (undated) RX ORDER — FENTANYL CITRATE 50 UG/ML
INJECTION, SOLUTION INTRAMUSCULAR; INTRAVENOUS
Status: DISPENSED
Start: 2023-02-12

## (undated) RX ORDER — FENTANYL CITRATE-0.9 % NACL/PF 10 MCG/ML
PLASTIC BAG, INJECTION (ML) INTRAVENOUS
Status: DISPENSED
Start: 2023-01-23

## (undated) RX ORDER — LIDOCAINE HYDROCHLORIDE 10 MG/ML
INJECTION, SOLUTION EPIDURAL; INFILTRATION; INTRACAUDAL; PERINEURAL
Status: DISPENSED
Start: 2022-05-24

## (undated) RX ORDER — PROPOFOL 10 MG/ML
INJECTION, EMULSION INTRAVENOUS
Status: DISPENSED
Start: 2023-02-12

## (undated) RX ORDER — FENTANYL CITRATE 50 UG/ML
INJECTION, SOLUTION INTRAMUSCULAR; INTRAVENOUS
Status: DISPENSED
Start: 2022-05-24

## (undated) RX ORDER — HEPARIN SODIUM 1000 [USP'U]/ML
INJECTION, SOLUTION INTRAVENOUS; SUBCUTANEOUS
Status: DISPENSED
Start: 2023-03-27

## (undated) RX ORDER — DEXAMETHASONE SODIUM PHOSPHATE 4 MG/ML
INJECTION, SOLUTION INTRA-ARTICULAR; INTRALESIONAL; INTRAMUSCULAR; INTRAVENOUS; SOFT TISSUE
Status: DISPENSED
Start: 2025-02-24

## (undated) RX ORDER — ACETAMINOPHEN 325 MG/1
TABLET ORAL
Status: DISPENSED
Start: 2025-07-30

## (undated) RX ORDER — FENTANYL CITRATE-0.9 % NACL/PF 10 MCG/ML
PLASTIC BAG, INJECTION (ML) INTRAVENOUS
Status: DISPENSED
Start: 2025-07-30

## (undated) RX ORDER — INDOMETHACIN 50 MG/1
SUPPOSITORY RECTAL
Status: DISPENSED
Start: 2023-05-24

## (undated) RX ORDER — ONDANSETRON 2 MG/ML
INJECTION INTRAMUSCULAR; INTRAVENOUS
Status: DISPENSED
Start: 2025-02-24

## (undated) RX ORDER — PROPOFOL 10 MG/ML
INJECTION, EMULSION INTRAVENOUS
Status: DISPENSED
Start: 2025-02-24

## (undated) RX ORDER — METOPROLOL TARTRATE 25 MG/1
TABLET, FILM COATED ORAL
Status: DISPENSED
Start: 2025-07-30

## (undated) RX ORDER — LIDOCAINE HYDROCHLORIDE 10 MG/ML
INJECTION, SOLUTION EPIDURAL; INFILTRATION; INTRACAUDAL; PERINEURAL
Status: DISPENSED
Start: 2023-03-10

## (undated) RX ORDER — ONDANSETRON 2 MG/ML
INJECTION INTRAMUSCULAR; INTRAVENOUS
Status: DISPENSED
Start: 2023-11-08

## (undated) RX ORDER — FENTANYL CITRATE 50 UG/ML
INJECTION, SOLUTION INTRAMUSCULAR; INTRAVENOUS
Status: DISPENSED
Start: 2025-07-30

## (undated) RX ORDER — ONDANSETRON 2 MG/ML
INJECTION INTRAMUSCULAR; INTRAVENOUS
Status: DISPENSED
Start: 2023-02-12

## (undated) RX ORDER — ONDANSETRON 2 MG/ML
INJECTION INTRAMUSCULAR; INTRAVENOUS
Status: DISPENSED
Start: 2023-01-23

## (undated) RX ORDER — LIDOCAINE HYDROCHLORIDE AND EPINEPHRINE 10; 10 MG/ML; UG/ML
INJECTION, SOLUTION INFILTRATION; PERINEURAL
Status: DISPENSED
Start: 2023-03-10

## (undated) RX ORDER — HYDROMORPHONE HCL IN WATER/PF 6 MG/30 ML
PATIENT CONTROLLED ANALGESIA SYRINGE INTRAVENOUS
Status: DISPENSED
Start: 2025-02-24

## (undated) RX ORDER — GLYCOPYRROLATE 0.2 MG/ML
INJECTION, SOLUTION INTRAMUSCULAR; INTRAVENOUS
Status: DISPENSED
Start: 2023-07-24

## (undated) RX ORDER — DEXAMETHASONE SODIUM PHOSPHATE 10 MG/ML
INJECTION, SOLUTION INTRAMUSCULAR; INTRAVENOUS
Status: DISPENSED
Start: 2022-05-24

## (undated) RX ORDER — ATROPINE SULFATE 0.4 MG/ML
AMPUL (ML) INJECTION
Status: DISPENSED
Start: 2023-02-22

## (undated) RX ORDER — HYDRALAZINE HYDROCHLORIDE 20 MG/ML
INJECTION INTRAMUSCULAR; INTRAVENOUS
Status: DISPENSED
Start: 2023-11-08

## (undated) RX ORDER — SODIUM CHLORIDE, SODIUM LACTATE, POTASSIUM CHLORIDE, CALCIUM CHLORIDE 600; 310; 30; 20 MG/100ML; MG/100ML; MG/100ML; MG/100ML
INJECTION, SOLUTION INTRAVENOUS
Status: DISPENSED
Start: 2023-05-24

## (undated) RX ORDER — FENTANYL CITRATE 50 UG/ML
INJECTION, SOLUTION INTRAMUSCULAR; INTRAVENOUS
Status: DISPENSED
Start: 2023-01-27

## (undated) RX ORDER — ONDANSETRON 2 MG/ML
INJECTION INTRAMUSCULAR; INTRAVENOUS
Status: DISPENSED
Start: 2025-07-30

## (undated) RX ORDER — PROPOFOL 10 MG/ML
INJECTION, EMULSION INTRAVENOUS
Status: DISPENSED
Start: 2023-11-08

## (undated) RX ORDER — METOPROLOL TARTRATE 1 MG/ML
INJECTION, SOLUTION INTRAVENOUS
Status: DISPENSED
Start: 2023-02-22

## (undated) RX ORDER — FENTANYL CITRATE 50 UG/ML
INJECTION, SOLUTION INTRAMUSCULAR; INTRAVENOUS
Status: DISPENSED
Start: 2023-05-24

## (undated) RX ORDER — SODIUM CHLORIDE 9 MG/ML
INJECTION, SOLUTION INTRAVENOUS
Status: DISPENSED
Start: 2023-05-18

## (undated) RX ORDER — HALOPERIDOL 5 MG/ML
INJECTION INTRAMUSCULAR
Status: DISPENSED
Start: 2025-02-24

## (undated) RX ORDER — FENTANYL CITRATE 50 UG/ML
INJECTION, SOLUTION INTRAMUSCULAR; INTRAVENOUS
Status: DISPENSED
Start: 2023-05-18

## (undated) RX ORDER — FENTANYL CITRATE-0.9 % NACL/PF 10 MCG/ML
PLASTIC BAG, INJECTION (ML) INTRAVENOUS
Status: DISPENSED
Start: 2023-02-12

## (undated) RX ORDER — LIDOCAINE HYDROCHLORIDE 10 MG/ML
INJECTION, SOLUTION EPIDURAL; INFILTRATION; INTRACAUDAL; PERINEURAL
Status: DISPENSED
Start: 2023-04-04

## (undated) RX ORDER — LIDOCAINE HYDROCHLORIDE 10 MG/ML
INJECTION, SOLUTION EPIDURAL; INFILTRATION; INTRACAUDAL; PERINEURAL
Status: DISPENSED
Start: 2023-01-23

## (undated) RX ORDER — PROPOFOL 10 MG/ML
INJECTION, EMULSION INTRAVENOUS
Status: DISPENSED
Start: 2023-01-23

## (undated) RX ORDER — HEPARIN SODIUM 1000 [USP'U]/ML
INJECTION, SOLUTION INTRAVENOUS; SUBCUTANEOUS
Status: DISPENSED
Start: 2023-04-03

## (undated) RX ORDER — IOPAMIDOL 510 MG/ML
INJECTION, SOLUTION INTRAVASCULAR
Status: DISPENSED
Start: 2025-02-24

## (undated) RX ORDER — CEFAZOLIN SODIUM 2 G/100ML
INJECTION, SOLUTION INTRAVENOUS
Status: DISPENSED
Start: 2023-03-10

## (undated) RX ORDER — ONDANSETRON 2 MG/ML
INJECTION INTRAMUSCULAR; INTRAVENOUS
Status: DISPENSED
Start: 2022-05-24

## (undated) RX ORDER — DOBUTAMINE HYDROCHLORIDE 200 MG/100ML
INJECTION INTRAVENOUS
Status: DISPENSED
Start: 2023-02-22

## (undated) RX ORDER — HEPARIN SODIUM 1000 [USP'U]/ML
INJECTION, SOLUTION INTRAVENOUS; SUBCUTANEOUS
Status: DISPENSED
Start: 2023-03-10

## (undated) RX ORDER — DEXAMETHASONE SODIUM PHOSPHATE 4 MG/ML
INJECTION, SOLUTION INTRA-ARTICULAR; INTRALESIONAL; INTRAMUSCULAR; INTRAVENOUS; SOFT TISSUE
Status: DISPENSED
Start: 2023-11-08

## (undated) RX ORDER — ONDANSETRON 2 MG/ML
INJECTION INTRAMUSCULAR; INTRAVENOUS
Status: DISPENSED
Start: 2023-07-24

## (undated) RX ORDER — FENTANYL CITRATE 50 UG/ML
INJECTION, SOLUTION INTRAMUSCULAR; INTRAVENOUS
Status: DISPENSED
Start: 2025-02-24

## (undated) RX ORDER — EPINEPHRINE 1 MG/ML
INJECTION, SOLUTION INTRAMUSCULAR; SUBCUTANEOUS
Status: DISPENSED
Start: 2025-07-30

## (undated) RX ORDER — CEFAZOLIN SODIUM 1 G/3ML
INJECTION, POWDER, FOR SOLUTION INTRAMUSCULAR; INTRAVENOUS
Status: DISPENSED
Start: 2023-03-01

## (undated) RX ORDER — PROPOFOL 10 MG/ML
INJECTION, EMULSION INTRAVENOUS
Status: DISPENSED
Start: 2023-02-28

## (undated) RX ORDER — FENTANYL CITRATE 50 UG/ML
INJECTION, SOLUTION INTRAMUSCULAR; INTRAVENOUS
Status: DISPENSED
Start: 2023-11-08

## (undated) RX ORDER — ETOMIDATE 2 MG/ML
INJECTION INTRAVENOUS
Status: DISPENSED
Start: 2023-02-12

## (undated) RX ORDER — PROPOFOL 10 MG/ML
INJECTION, EMULSION INTRAVENOUS
Status: DISPENSED
Start: 2022-05-24

## (undated) RX ORDER — LEVOFLOXACIN 5 MG/ML
INJECTION, SOLUTION INTRAVENOUS
Status: DISPENSED
Start: 2023-07-24

## (undated) RX ORDER — PROPOFOL 10 MG/ML
INJECTION, EMULSION INTRAVENOUS
Status: DISPENSED
Start: 2025-07-30

## (undated) RX ORDER — FENTANYL CITRATE-0.9 % NACL/PF 10 MCG/ML
PLASTIC BAG, INJECTION (ML) INTRAVENOUS
Status: DISPENSED
Start: 2025-02-24

## (undated) RX ORDER — FENTANYL CITRATE 50 UG/ML
INJECTION, SOLUTION INTRAMUSCULAR; INTRAVENOUS
Status: DISPENSED
Start: 2023-03-10

## (undated) RX ORDER — INDOMETHACIN 50 MG/1
SUPPOSITORY RECTAL
Status: DISPENSED
Start: 2025-07-30

## (undated) RX ORDER — FENTANYL CITRATE-0.9 % NACL/PF 10 MCG/ML
PLASTIC BAG, INJECTION (ML) INTRAVENOUS
Status: DISPENSED
Start: 2023-11-08